# Patient Record
Sex: MALE | Race: WHITE | NOT HISPANIC OR LATINO
[De-identification: names, ages, dates, MRNs, and addresses within clinical notes are randomized per-mention and may not be internally consistent; named-entity substitution may affect disease eponyms.]

---

## 2015-11-27 RX ORDER — TACROLIMUS 5 MG/1
1.5 CAPSULE ORAL
Qty: 0 | Refills: 0 | COMMUNITY
Start: 2015-11-27

## 2015-11-27 RX ORDER — INSULIN GLARGINE 100 [IU]/ML
80 INJECTION, SOLUTION SUBCUTANEOUS
Qty: 0 | Refills: 0 | COMMUNITY
Start: 2015-11-27

## 2015-11-27 RX ORDER — INSULIN LISPRO 100/ML
15 VIAL (ML) SUBCUTANEOUS
Qty: 0 | Refills: 0 | COMMUNITY
Start: 2015-11-27

## 2017-01-23 ENCOUNTER — APPOINTMENT (OUTPATIENT)
Dept: DERMATOLOGY | Facility: CLINIC | Age: 52
End: 2017-01-23

## 2017-01-23 VITALS
BODY MASS INDEX: 39.1 KG/M2 | WEIGHT: 295 LBS | SYSTOLIC BLOOD PRESSURE: 130 MMHG | DIASTOLIC BLOOD PRESSURE: 80 MMHG | HEIGHT: 73 IN

## 2017-01-23 DIAGNOSIS — Z79.899 OTHER LONG TERM (CURRENT) DRUG THERAPY: ICD-10-CM

## 2017-01-23 DIAGNOSIS — Z87.448 PERSONAL HISTORY OF OTHER DISEASES OF URINARY SYSTEM: ICD-10-CM

## 2017-01-23 DIAGNOSIS — L21.9 SEBORRHEIC DERMATITIS, UNSPECIFIED: ICD-10-CM

## 2017-01-23 DIAGNOSIS — H91.90 UNSPECIFIED HEARING LOSS, UNSPECIFIED EAR: ICD-10-CM

## 2017-01-23 DIAGNOSIS — Z12.83 ENCOUNTER FOR SCREENING FOR MALIGNANT NEOPLASM OF SKIN: ICD-10-CM

## 2017-01-23 DIAGNOSIS — Z86.39 PERSONAL HISTORY OF OTHER ENDOCRINE, NUTRITIONAL AND METABOLIC DISEASE: ICD-10-CM

## 2017-02-27 ENCOUNTER — LABORATORY RESULT (OUTPATIENT)
Age: 52
End: 2017-02-27

## 2017-02-27 ENCOUNTER — OUTPATIENT (OUTPATIENT)
Dept: OUTPATIENT SERVICES | Facility: HOSPITAL | Age: 52
LOS: 1 days | End: 2017-02-27
Payer: MEDICARE

## 2017-02-27 ENCOUNTER — APPOINTMENT (OUTPATIENT)
Dept: NEPHROLOGY | Facility: CLINIC | Age: 52
End: 2017-02-27

## 2017-02-27 VITALS
HEART RATE: 69 BPM | OXYGEN SATURATION: 94 % | SYSTOLIC BLOOD PRESSURE: 152 MMHG | DIASTOLIC BLOOD PRESSURE: 92 MMHG | WEIGHT: 309 LBS | TEMPERATURE: 98 F | BODY MASS INDEX: 40.77 KG/M2

## 2017-02-27 DIAGNOSIS — N18.6 END STAGE RENAL DISEASE: Chronic | ICD-10-CM

## 2017-02-27 DIAGNOSIS — Z00.00 ENCOUNTER FOR GENERAL ADULT MEDICAL EXAMINATION W/OUT ABNORMAL FINDINGS: ICD-10-CM

## 2017-02-27 DIAGNOSIS — Z94.0 KIDNEY TRANSPLANT STATUS: ICD-10-CM

## 2017-02-27 DIAGNOSIS — N18.9 CHRONIC KIDNEY DISEASE, UNSPECIFIED: ICD-10-CM

## 2017-02-27 DIAGNOSIS — R60.0 LOCALIZED EDEMA: ICD-10-CM

## 2017-02-27 LAB
ALBUMIN SERPL ELPH-MCNC: 4.1 G/DL — SIGNIFICANT CHANGE UP (ref 3.3–5)
ALP SERPL-CCNC: 115 U/L — SIGNIFICANT CHANGE UP (ref 40–120)
ALT FLD-CCNC: 15 U/L — SIGNIFICANT CHANGE UP (ref 10–45)
ANION GAP SERPL CALC-SCNC: 13 MMOL/L — SIGNIFICANT CHANGE UP (ref 5–17)
APPEARANCE UR: CLEAR — SIGNIFICANT CHANGE UP
AST SERPL-CCNC: 13 U/L — SIGNIFICANT CHANGE UP (ref 10–40)
BACTERIA # UR AUTO: NEGATIVE — SIGNIFICANT CHANGE UP
BASOPHILS # BLD AUTO: 0.05 K/UL — SIGNIFICANT CHANGE UP (ref 0–0.2)
BASOPHILS NFR BLD AUTO: 0.7 % — SIGNIFICANT CHANGE UP (ref 0–2)
BILIRUB SERPL-MCNC: 0.5 MG/DL — SIGNIFICANT CHANGE UP (ref 0.2–1.2)
BILIRUB UR-MCNC: NEGATIVE — SIGNIFICANT CHANGE UP
BUN SERPL-MCNC: 15 MG/DL — SIGNIFICANT CHANGE UP (ref 7–23)
CALCIUM SERPL-MCNC: 9.6 MG/DL — SIGNIFICANT CHANGE UP (ref 8.4–10.5)
CHLORIDE SERPL-SCNC: 96 MMOL/L — SIGNIFICANT CHANGE UP (ref 96–108)
CO2 SERPL-SCNC: 33 MMOL/L — HIGH (ref 22–31)
COLOR SPEC: YELLOW — SIGNIFICANT CHANGE UP
CREAT ?TM UR-MCNC: 108 MG/DL — SIGNIFICANT CHANGE UP
CREAT SERPL-MCNC: 1.09 MG/DL — SIGNIFICANT CHANGE UP (ref 0.5–1.3)
DIFF PNL FLD: ABNORMAL
EOSINOPHIL # BLD AUTO: 0.37 K/UL — SIGNIFICANT CHANGE UP (ref 0–0.5)
EOSINOPHIL NFR BLD AUTO: 5 % — SIGNIFICANT CHANGE UP (ref 0–6)
EPI CELLS # UR: 0 /HPF — SIGNIFICANT CHANGE UP (ref 0–5)
GLUCOSE SERPL-MCNC: 191 MG/DL — HIGH (ref 70–99)
GLUCOSE UR QL: 500 MG/DL
HCT VFR BLD CALC: 54.3 % — HIGH (ref 39–50)
HGB BLD-MCNC: 17.7 G/DL — HIGH (ref 13–17)
IMM GRANULOCYTES NFR BLD AUTO: 0.1 % — SIGNIFICANT CHANGE UP (ref 0–1.5)
KETONES UR-MCNC: NEGATIVE — SIGNIFICANT CHANGE UP
LDH SERPL L TO P-CCNC: 177 U/L — SIGNIFICANT CHANGE UP (ref 50–242)
LEUKOCYTE ESTERASE UR-ACNC: NEGATIVE — SIGNIFICANT CHANGE UP
LYMPHOCYTES # BLD AUTO: 1.28 K/UL — SIGNIFICANT CHANGE UP (ref 1–3.3)
LYMPHOCYTES # BLD AUTO: 17.2 % — SIGNIFICANT CHANGE UP (ref 13–44)
MAGNESIUM SERPL-MCNC: 1.5 MG/DL — LOW (ref 1.6–2.6)
MCHC RBC-ENTMCNC: 29.8 PG — SIGNIFICANT CHANGE UP (ref 27–34)
MCHC RBC-ENTMCNC: 32.6 GM/DL — SIGNIFICANT CHANGE UP (ref 32–36)
MCV RBC AUTO: 91.4 FL — SIGNIFICANT CHANGE UP (ref 80–100)
MONOCYTES # BLD AUTO: 0.66 K/UL — SIGNIFICANT CHANGE UP (ref 0–0.9)
MONOCYTES NFR BLD AUTO: 8.9 % — SIGNIFICANT CHANGE UP (ref 2–14)
NEUTROPHILS # BLD AUTO: 5.07 K/UL — SIGNIFICANT CHANGE UP (ref 1.8–7.4)
NEUTROPHILS NFR BLD AUTO: 68.1 % — SIGNIFICANT CHANGE UP (ref 43–77)
NITRITE UR-MCNC: NEGATIVE — SIGNIFICANT CHANGE UP
PH UR: 6 — SIGNIFICANT CHANGE UP (ref 5–8)
PHOSPHATE SERPL-MCNC: 3.1 MG/DL — SIGNIFICANT CHANGE UP (ref 2.5–4.5)
PLATELET # BLD AUTO: 166 K/UL — SIGNIFICANT CHANGE UP (ref 150–400)
POTASSIUM SERPL-MCNC: 4.5 MMOL/L — SIGNIFICANT CHANGE UP (ref 3.5–5.3)
POTASSIUM SERPL-SCNC: 4.5 MMOL/L — SIGNIFICANT CHANGE UP (ref 3.5–5.3)
PROT ?TM UR-MCNC: 58 MG/DL — HIGH (ref 0–12)
PROT SERPL-MCNC: 6.6 G/DL — SIGNIFICANT CHANGE UP (ref 6–8.3)
PROT UR-MCNC: 30 MG/DL
PROT/CREAT UR-RTO: 0.5 RATIO — HIGH (ref 0–0.2)
RBC # BLD: 5.94 M/UL — HIGH (ref 4.2–5.8)
RBC # FLD: 13.8 % — SIGNIFICANT CHANGE UP (ref 10.3–14.5)
RBC CASTS # UR COMP ASSIST: 20 /HPF — HIGH (ref 0–4)
SODIUM SERPL-SCNC: 142 MMOL/L — SIGNIFICANT CHANGE UP (ref 135–145)
SP GR SPEC: 1.02 — SIGNIFICANT CHANGE UP (ref 1.01–1.02)
TACROLIMUS SERPL-MCNC: 7.6 NG/ML — SIGNIFICANT CHANGE UP
URATE SERPL-MCNC: 3.6 MG/DL — SIGNIFICANT CHANGE UP (ref 3.4–8.8)
UROBILINOGEN FLD QL: NEGATIVE MG/DL — SIGNIFICANT CHANGE UP
WBC # BLD: 7.44 K/UL — SIGNIFICANT CHANGE UP (ref 3.8–10.5)
WBC # FLD AUTO: 7.44 K/UL — SIGNIFICANT CHANGE UP (ref 3.8–10.5)
WBC UR QL: 1 /HPF — SIGNIFICANT CHANGE UP (ref 0–5)

## 2017-02-27 PROCEDURE — 87799 DETECT AGENT NOS DNA QUANT: CPT

## 2017-02-27 PROCEDURE — 81001 URINALYSIS AUTO W/SCOPE: CPT

## 2017-02-27 PROCEDURE — 84550 ASSAY OF BLOOD/URIC ACID: CPT

## 2017-02-27 PROCEDURE — 83735 ASSAY OF MAGNESIUM: CPT

## 2017-02-27 PROCEDURE — 84100 ASSAY OF PHOSPHORUS: CPT

## 2017-02-27 PROCEDURE — 84156 ASSAY OF PROTEIN URINE: CPT

## 2017-02-27 PROCEDURE — 80053 COMPREHEN METABOLIC PANEL: CPT

## 2017-02-27 PROCEDURE — 83615 LACTATE (LD) (LDH) ENZYME: CPT

## 2017-02-27 PROCEDURE — 80197 ASSAY OF TACROLIMUS: CPT

## 2017-02-27 PROCEDURE — 85027 COMPLETE CBC AUTOMATED: CPT

## 2017-02-27 RX ORDER — KETOCONAZOLE 20 MG/G
2 CREAM TOPICAL
Qty: 1 | Refills: 0 | Status: DISCONTINUED | COMMUNITY
Start: 2017-01-23 | End: 2017-02-27

## 2017-02-27 RX ORDER — HYDROCORTISONE 25 MG/G
2.5 CREAM TOPICAL
Qty: 1 | Refills: 2 | Status: DISCONTINUED | COMMUNITY
Start: 2017-01-23 | End: 2017-02-27

## 2017-03-01 LAB
BKV DNA SPEC QL NAA+PROBE: SIGNIFICANT CHANGE UP
CMV DNA CSF QL NAA+PROBE: SIGNIFICANT CHANGE UP
CMV DNA SPEC NAA+PROBE-LOG#: SIGNIFICANT CHANGE UP LOGIU/ML

## 2017-03-27 ENCOUNTER — APPOINTMENT (OUTPATIENT)
Dept: NEPHROLOGY | Facility: CLINIC | Age: 52
End: 2017-03-27

## 2017-03-27 VITALS
DIASTOLIC BLOOD PRESSURE: 92 MMHG | HEART RATE: 72 BPM | BODY MASS INDEX: 41.43 KG/M2 | SYSTOLIC BLOOD PRESSURE: 162 MMHG | TEMPERATURE: 98.1 F | WEIGHT: 314 LBS | RESPIRATION RATE: 16 BRPM

## 2017-03-27 RX ORDER — PEN NEEDLE, DIABETIC 29 G X1/2"
31G X 8 MM NEEDLE, DISPOSABLE MISCELLANEOUS
Qty: 100 | Refills: 0 | Status: ACTIVE | COMMUNITY
Start: 2016-11-10

## 2017-04-23 ENCOUNTER — INPATIENT (INPATIENT)
Facility: HOSPITAL | Age: 52
LOS: 15 days | Discharge: HOME | DRG: 189 | End: 2017-05-09
Attending: INTERNAL MEDICINE | Admitting: INTERNAL MEDICINE
Payer: MEDICARE

## 2017-04-23 VITALS
HEART RATE: 112 BPM | DIASTOLIC BLOOD PRESSURE: 79 MMHG | RESPIRATION RATE: 20 BRPM | OXYGEN SATURATION: 65 % | TEMPERATURE: 98 F | SYSTOLIC BLOOD PRESSURE: 184 MMHG

## 2017-04-23 DIAGNOSIS — N18.6 END STAGE RENAL DISEASE: Chronic | ICD-10-CM

## 2017-04-23 LAB
APPEARANCE UR: CLEAR — SIGNIFICANT CHANGE UP
APTT BLD: 28.6 SEC — SIGNIFICANT CHANGE UP (ref 27.5–37.4)
BASOPHILS # BLD AUTO: 0 K/UL — SIGNIFICANT CHANGE UP (ref 0–0.2)
BASOPHILS NFR BLD AUTO: 0.3 % — SIGNIFICANT CHANGE UP (ref 0–2)
BILIRUB UR-MCNC: NEGATIVE — SIGNIFICANT CHANGE UP
CK MB BLD-MCNC: 1.6 % — SIGNIFICANT CHANGE UP (ref 0–3.5)
CK MB CFR SERPL CALC: 2.4 NG/ML — SIGNIFICANT CHANGE UP (ref 0–6.7)
CK SERPL-CCNC: 148 U/L — SIGNIFICANT CHANGE UP (ref 30–200)
COLOR SPEC: YELLOW — SIGNIFICANT CHANGE UP
DIFF PNL FLD: ABNORMAL
EOSINOPHIL # BLD AUTO: 0.1 K/UL — SIGNIFICANT CHANGE UP (ref 0–0.5)
EOSINOPHIL NFR BLD AUTO: 0.9 % — SIGNIFICANT CHANGE UP (ref 0–6)
EPI CELLS # UR: SIGNIFICANT CHANGE UP /HPF
GAS PNL BLDV: SIGNIFICANT CHANGE UP
GLUCOSE UR QL: >1000
HCT VFR BLD CALC: 54.6 % — HIGH (ref 39–50)
HGB BLD-MCNC: 17.7 G/DL — HIGH (ref 13–17)
HPIV3 RNA SPEC QL NAA+PROBE: DETECTED
INR BLD: 1.05 RATIO — SIGNIFICANT CHANGE UP (ref 0.88–1.16)
KETONES UR-MCNC: NEGATIVE — SIGNIFICANT CHANGE UP
LEUKOCYTE ESTERASE UR-ACNC: NEGATIVE — SIGNIFICANT CHANGE UP
LYMPHOCYTES # BLD AUTO: 0.7 K/UL — LOW (ref 1–3.3)
LYMPHOCYTES # BLD AUTO: 10.1 % — LOW (ref 13–44)
MCHC RBC-ENTMCNC: 29.8 PG — SIGNIFICANT CHANGE UP (ref 27–34)
MCHC RBC-ENTMCNC: 32.4 GM/DL — SIGNIFICANT CHANGE UP (ref 32–36)
MCV RBC AUTO: 92 FL — SIGNIFICANT CHANGE UP (ref 80–100)
MONOCYTES # BLD AUTO: 1 K/UL — HIGH (ref 0–0.9)
MONOCYTES NFR BLD AUTO: 13.8 % — SIGNIFICANT CHANGE UP (ref 2–14)
NEUTROPHILS # BLD AUTO: 5.3 K/UL — SIGNIFICANT CHANGE UP (ref 1.8–7.4)
NEUTROPHILS NFR BLD AUTO: 74.9 % — SIGNIFICANT CHANGE UP (ref 43–77)
NITRITE UR-MCNC: NEGATIVE — SIGNIFICANT CHANGE UP
NT-PROBNP SERPL-SCNC: 840 PG/ML — HIGH (ref 0–300)
PH UR: 6 — SIGNIFICANT CHANGE UP (ref 5–8)
PLATELET # BLD AUTO: 164 K/UL — SIGNIFICANT CHANGE UP (ref 150–400)
PROT UR-MCNC: 100 MG/DL
PROTHROM AB SERPL-ACNC: 11.4 SEC — SIGNIFICANT CHANGE UP (ref 9.8–12.7)
RAPID RVP RESULT: DETECTED
RBC # BLD: 5.94 M/UL — HIGH (ref 4.2–5.8)
RBC # FLD: 13.8 % — SIGNIFICANT CHANGE UP (ref 10.3–14.5)
RBC CASTS # UR COMP ASSIST: ABNORMAL /HPF (ref 0–2)
SP GR SPEC: >1.03 — HIGH (ref 1.01–1.02)
UROBILINOGEN FLD QL: NEGATIVE — SIGNIFICANT CHANGE UP
WBC # BLD: 7 K/UL — SIGNIFICANT CHANGE UP (ref 3.8–10.5)
WBC # FLD AUTO: 7 K/UL — SIGNIFICANT CHANGE UP (ref 3.8–10.5)
WBC UR QL: SIGNIFICANT CHANGE UP /HPF (ref 0–5)

## 2017-04-23 PROCEDURE — 99291 CRITICAL CARE FIRST HOUR: CPT | Mod: 25,GC

## 2017-04-23 PROCEDURE — 93010 ELECTROCARDIOGRAM REPORT: CPT | Mod: 59

## 2017-04-23 PROCEDURE — 31500 INSERT EMERGENCY AIRWAY: CPT | Mod: GC

## 2017-04-23 PROCEDURE — 71250 CT THORAX DX C-: CPT | Mod: 26

## 2017-04-23 PROCEDURE — 71010: CPT | Mod: 26

## 2017-04-23 RX ORDER — SODIUM CHLORIDE 9 MG/ML
1000 INJECTION INTRAMUSCULAR; INTRAVENOUS; SUBCUTANEOUS ONCE
Qty: 0 | Refills: 0 | Status: COMPLETED | OUTPATIENT
Start: 2017-04-23 | End: 2017-04-23

## 2017-04-23 RX ORDER — ALBUTEROL 90 UG/1
2.5 AEROSOL, METERED ORAL
Qty: 0 | Refills: 0 | Status: COMPLETED | OUTPATIENT
Start: 2017-04-23 | End: 2017-04-23

## 2017-04-23 RX ORDER — ACETAMINOPHEN 500 MG
1000 TABLET ORAL ONCE
Qty: 0 | Refills: 0 | Status: COMPLETED | OUTPATIENT
Start: 2017-04-23 | End: 2017-04-23

## 2017-04-23 RX ORDER — INSULIN LISPRO 100/ML
10 VIAL (ML) SUBCUTANEOUS ONCE
Qty: 0 | Refills: 0 | Status: COMPLETED | OUTPATIENT
Start: 2017-04-23 | End: 2017-04-23

## 2017-04-23 RX ADMIN — ALBUTEROL 2.5 MILLIGRAM(S): 90 AEROSOL, METERED ORAL at 21:06

## 2017-04-23 RX ADMIN — Medication 400 MILLIGRAM(S): at 23:20

## 2017-04-23 RX ADMIN — Medication 10 UNIT(S): at 22:25

## 2017-04-23 RX ADMIN — ALBUTEROL 2.5 MILLIGRAM(S): 90 AEROSOL, METERED ORAL at 21:05

## 2017-04-23 RX ADMIN — ALBUTEROL 2.5 MILLIGRAM(S): 90 AEROSOL, METERED ORAL at 21:04

## 2017-04-23 RX ADMIN — Medication 1000 MILLIGRAM(S): at 23:31

## 2017-04-23 RX ADMIN — SODIUM CHLORIDE 1000 MILLILITER(S): 9 INJECTION INTRAMUSCULAR; INTRAVENOUS; SUBCUTANEOUS at 22:25

## 2017-04-23 NOTE — ED ADULT NURSE NOTE - PMH
CKD (chronic kidney disease)    Diabetes mellitus    Diabetic peripheral neuropathy    Hyperlipidemia    Hypertension    Obesity (BMI 30-39.9)

## 2017-04-23 NOTE — ED PROVIDER NOTE - MEDICAL DECISION MAKING DETAILS
Mike: 51 year old male s/p renal transplant being worked up for pulmonary disorder but no diagnosis yet. Patient with low saturations, b/l wheezes and rhonchi.  not tachypenic. will get labs, duonebs, cxr, possible ct chest. patient is also hyperglycemic. labs show no anion gap. Patient did not take insulin today because did not eat. will hydrate and give insulin.  Admit to hospital.

## 2017-04-23 NOTE — ED PROVIDER NOTE - PROGRESS NOTE DETAILS
Sign out follow-up: Pt admitted for PNA. Wife notified staff pt altered. Pt unresponsive RN notified MD that patient was becoming altered and hypoxic. Pt's wife states that she was noticing his O2 sat was dropping and he was becoming more somnolent. Pt trialed on BiPap and ABG sent as well as stat CXR. Pt only pulling 150cc on Bipap and cxr showing pulmonary vascular congestion. Decision was made to intubate. Wife informed and consenting. ABG resulted in pCO2 131. Renzo Quiñones, Resident. Patient more comfortable after duonebs. Patient saturating 90% on nc 6l. No respiratory distress. cxr unremarkable, given lung exam findings, will get ct chest. Patient spiked fever to 101.5.will give tylenol and cover for pna. ct chest shows multilobar opacities.  Patient informed of results. rvp positive for parainfluenza. Pt intubated for hypercapnic respiratory failure. Admission upgraded to MICU. KP

## 2017-04-23 NOTE — ED PROVIDER NOTE - OBJECTIVE STATEMENT
51yM h/o CKD s/p renal transplant Dec 2013 on tacrolimus, DM on insulin, HTN, HLD, obesity presents with shortness of breath. Patient reports recent SOB and hypoxia noted at transplant center with outpatient pulmonary workup this week including CT chest (not aware of results)  Never smoker. 51yM h/o CKD s/p renal transplant Dec 2013 on tacrolimus, DM on insulin, HTN, HLD, obesity presents with shortness of breath. Patient reports recent SOB and hypoxia noted at transplant center with outpatient pulmonary workup this week including CT chest (not aware of results) at [place]. Worsening symptoms x 1 day. Drove to [place] from [place] today. Notes increased b/l LE edema and orthopnea. Worked in [employer] during 9/11. Never smoker. 51yM h/o CKD s/p renal transplant Dec 2013 on tacrolimus, DM on insulin, HTN, HLD, obesity presents with shortness of breath. Patient reports recent SOB and hypoxia noted at transplant center with outpatient pulmonary workup this week including CT chest (not aware of results) at [place]. Worsening symptoms x 1 day. Drove to [place] from [place] today. Notes increased b/l LE edema and orthopnea. Worked in [facility] during 9/11. Never smoker.  PMD Arti (hospitalist)

## 2017-04-23 NOTE — ED ADULT NURSE NOTE - PSH
Ear disease  Left inner ear surgery  End-stage renal failure with renal transplant  12/2013  H/O foot surgery  2005 - right foot - bone fracture - s/p ORIF and subsequent removal of hardware  Toe infection  2007 L 4th Toe surgery-amputation secondary to osteomyelitis  Vasectomy status  s/p b/l  vasectomy

## 2017-04-23 NOTE — ED PROVIDER NOTE - CRITICAL CARE PROVIDED
documentation/consultation with other physicians/interpretation of diagnostic studies/additional history taking/consult w/ pt's family directly relating to pts condition

## 2017-04-24 DIAGNOSIS — I82.409 ACUTE EMBOLISM AND THROMBOSIS OF UNSPECIFIED DEEP VEINS OF UNSPECIFIED LOWER EXTREMITY: ICD-10-CM

## 2017-04-24 DIAGNOSIS — E11.9 TYPE 2 DIABETES MELLITUS WITHOUT COMPLICATIONS: ICD-10-CM

## 2017-04-24 DIAGNOSIS — I63.9 CEREBRAL INFARCTION, UNSPECIFIED: ICD-10-CM

## 2017-04-24 DIAGNOSIS — N17.9 ACUTE KIDNEY FAILURE, UNSPECIFIED: ICD-10-CM

## 2017-04-24 DIAGNOSIS — J18.9 PNEUMONIA, UNSPECIFIED ORGANISM: ICD-10-CM

## 2017-04-24 DIAGNOSIS — Z29.9 ENCOUNTER FOR PROPHYLACTIC MEASURES, UNSPECIFIED: ICD-10-CM

## 2017-04-24 DIAGNOSIS — I26.99 OTHER PULMONARY EMBOLISM WITHOUT ACUTE COR PULMONALE: ICD-10-CM

## 2017-04-24 DIAGNOSIS — I10 ESSENTIAL (PRIMARY) HYPERTENSION: ICD-10-CM

## 2017-04-24 DIAGNOSIS — R09.02 HYPOXEMIA: ICD-10-CM

## 2017-04-24 DIAGNOSIS — Z94.89 OTHER TRANSPLANTED ORGAN AND TISSUE STATUS: ICD-10-CM

## 2017-04-24 LAB
ALBUMIN SERPL ELPH-MCNC: 3.3 G/DL — SIGNIFICANT CHANGE UP (ref 3.3–5)
ALP SERPL-CCNC: 78 U/L — SIGNIFICANT CHANGE UP (ref 40–120)
ALT FLD-CCNC: 15 U/L RC — SIGNIFICANT CHANGE UP (ref 10–45)
ANION GAP SERPL CALC-SCNC: 11 MMOL/L — SIGNIFICANT CHANGE UP (ref 5–17)
ANION GAP SERPL CALC-SCNC: 12 MMOL/L — SIGNIFICANT CHANGE UP (ref 5–17)
APTT BLD: 26.1 SEC — LOW (ref 27.5–37.4)
AST SERPL-CCNC: 24 U/L — SIGNIFICANT CHANGE UP (ref 10–40)
BASE EXCESS BLDA CALC-SCNC: 3.5 MMOL/L — HIGH (ref -2–2)
BILIRUB SERPL-MCNC: 0.6 MG/DL — SIGNIFICANT CHANGE UP (ref 0.2–1.2)
BUN SERPL-MCNC: 20 MG/DL — SIGNIFICANT CHANGE UP (ref 7–23)
BUN SERPL-MCNC: 21 MG/DL — SIGNIFICANT CHANGE UP (ref 7–23)
CALCIUM SERPL-MCNC: 7.8 MG/DL — LOW (ref 8.4–10.5)
CALCIUM SERPL-MCNC: 8.5 MG/DL — SIGNIFICANT CHANGE UP (ref 8.4–10.5)
CHLORIDE SERPL-SCNC: 96 MMOL/L — SIGNIFICANT CHANGE UP (ref 96–108)
CHLORIDE SERPL-SCNC: 99 MMOL/L — SIGNIFICANT CHANGE UP (ref 96–108)
CO2 BLDA-SCNC: 37 MMOL/L — HIGH (ref 22–30)
CO2 SERPL-SCNC: 31 MMOL/L — SIGNIFICANT CHANGE UP (ref 22–31)
CO2 SERPL-SCNC: 31 MMOL/L — SIGNIFICANT CHANGE UP (ref 22–31)
CREAT SERPL-MCNC: 1.39 MG/DL — HIGH (ref 0.5–1.3)
CREAT SERPL-MCNC: 1.77 MG/DL — HIGH (ref 0.5–1.3)
GAS PNL BLDA: SIGNIFICANT CHANGE UP
GLUCOSE SERPL-MCNC: 259 MG/DL — HIGH (ref 70–99)
GLUCOSE SERPL-MCNC: 404 MG/DL — HIGH (ref 70–99)
HBA1C BLD-MCNC: 11.4 % — HIGH (ref 4–5.6)
HCO3 BLDA-SCNC: 34 MMOL/L — HIGH (ref 21–29)
HCT VFR BLD CALC: 49.2 % — SIGNIFICANT CHANGE UP (ref 39–50)
HGB BLD-MCNC: 15.9 G/DL — SIGNIFICANT CHANGE UP (ref 13–17)
HOROWITZ INDEX BLDA+IHG-RTO: SIGNIFICANT CHANGE UP
INR BLD: 1.22 RATIO — HIGH (ref 0.88–1.16)
MAGNESIUM SERPL-MCNC: 1.6 MG/DL — SIGNIFICANT CHANGE UP (ref 1.6–2.6)
MCHC RBC-ENTMCNC: 29.7 PG — SIGNIFICANT CHANGE UP (ref 27–34)
MCHC RBC-ENTMCNC: 32.4 GM/DL — SIGNIFICANT CHANGE UP (ref 32–36)
MCV RBC AUTO: 91.6 FL — SIGNIFICANT CHANGE UP (ref 80–100)
PCO2 BLDA: 82 MMHG — CRITICAL HIGH (ref 32–46)
PH BLDA: 7.25 — LOW (ref 7.35–7.45)
PHOSPHATE SERPL-MCNC: 2.5 MG/DL — SIGNIFICANT CHANGE UP (ref 2.5–4.5)
PLATELET # BLD AUTO: 166 K/UL — SIGNIFICANT CHANGE UP (ref 150–400)
PO2 BLDA: 84 MMHG — SIGNIFICANT CHANGE UP (ref 74–108)
POTASSIUM SERPL-MCNC: 4.5 MMOL/L — SIGNIFICANT CHANGE UP (ref 3.5–5.3)
POTASSIUM SERPL-MCNC: 4.6 MMOL/L — SIGNIFICANT CHANGE UP (ref 3.5–5.3)
POTASSIUM SERPL-SCNC: 4.5 MMOL/L — SIGNIFICANT CHANGE UP (ref 3.5–5.3)
POTASSIUM SERPL-SCNC: 4.6 MMOL/L — SIGNIFICANT CHANGE UP (ref 3.5–5.3)
PROT SERPL-MCNC: 5.5 G/DL — LOW (ref 6–8.3)
PROTHROM AB SERPL-ACNC: 13.3 SEC — HIGH (ref 9.8–12.7)
RBC # BLD: 5.37 M/UL — SIGNIFICANT CHANGE UP (ref 4.2–5.8)
RBC # FLD: 13.9 % — SIGNIFICANT CHANGE UP (ref 10.3–14.5)
SAO2 % BLDA: 93 % — SIGNIFICANT CHANGE UP (ref 92–96)
SODIUM SERPL-SCNC: 139 MMOL/L — SIGNIFICANT CHANGE UP (ref 135–145)
SODIUM SERPL-SCNC: 141 MMOL/L — SIGNIFICANT CHANGE UP (ref 135–145)
TACROLIMUS SERPL-MCNC: 4.9 NG/ML — SIGNIFICANT CHANGE UP
VANCOMYCIN TROUGH SERPL-MCNC: 4.3 UG/ML — LOW (ref 10–20)
WBC # BLD: 10.5 K/UL — SIGNIFICANT CHANGE UP (ref 3.8–10.5)
WBC # FLD AUTO: 10.5 K/UL — SIGNIFICANT CHANGE UP (ref 3.8–10.5)

## 2017-04-24 PROCEDURE — 93306 TTE W/DOPPLER COMPLETE: CPT | Mod: 26

## 2017-04-24 PROCEDURE — 99291 CRITICAL CARE FIRST HOUR: CPT

## 2017-04-24 PROCEDURE — 93970 EXTREMITY STUDY: CPT | Mod: 26

## 2017-04-24 PROCEDURE — 71010: CPT | Mod: 26,76

## 2017-04-24 PROCEDURE — 99223 1ST HOSP IP/OBS HIGH 75: CPT | Mod: AI

## 2017-04-24 PROCEDURE — 99223 1ST HOSP IP/OBS HIGH 75: CPT | Mod: GC

## 2017-04-24 RX ORDER — CEFTRIAXONE 500 MG/1
1 INJECTION, POWDER, FOR SOLUTION INTRAMUSCULAR; INTRAVENOUS ONCE
Qty: 0 | Refills: 0 | Status: COMPLETED | OUTPATIENT
Start: 2017-04-24 | End: 2017-04-24

## 2017-04-24 RX ORDER — ACETAMINOPHEN 500 MG
650 TABLET ORAL EVERY 6 HOURS
Qty: 0 | Refills: 0 | Status: DISCONTINUED | OUTPATIENT
Start: 2017-04-24 | End: 2017-04-24

## 2017-04-24 RX ORDER — TACROLIMUS 5 MG/1
1.5 CAPSULE ORAL
Qty: 0 | Refills: 0 | Status: DISCONTINUED | OUTPATIENT
Start: 2017-04-24 | End: 2017-04-24

## 2017-04-24 RX ORDER — INSULIN LISPRO 100/ML
5 VIAL (ML) SUBCUTANEOUS ONCE
Qty: 0 | Refills: 0 | Status: DISCONTINUED | OUTPATIENT
Start: 2017-04-24 | End: 2017-04-24

## 2017-04-24 RX ORDER — VANCOMYCIN HCL 1 G
1000 VIAL (EA) INTRAVENOUS EVERY 12 HOURS
Qty: 0 | Refills: 0 | Status: DISCONTINUED | OUTPATIENT
Start: 2017-04-24 | End: 2017-04-26

## 2017-04-24 RX ORDER — INSULIN LISPRO 100/ML
VIAL (ML) SUBCUTANEOUS EVERY 4 HOURS
Qty: 0 | Refills: 0 | Status: DISCONTINUED | OUTPATIENT
Start: 2017-04-24 | End: 2017-04-26

## 2017-04-24 RX ORDER — ACETAMINOPHEN 500 MG
650 TABLET ORAL ONCE
Qty: 0 | Refills: 0 | Status: COMPLETED | OUTPATIENT
Start: 2017-04-24 | End: 2017-04-24

## 2017-04-24 RX ORDER — SODIUM CHLORIDE 9 MG/ML
1000 INJECTION, SOLUTION INTRAVENOUS
Qty: 0 | Refills: 0 | Status: DISCONTINUED | OUTPATIENT
Start: 2017-04-24 | End: 2017-05-09

## 2017-04-24 RX ORDER — DEXTROSE 50 % IN WATER 50 %
12.5 SYRINGE (ML) INTRAVENOUS ONCE
Qty: 0 | Refills: 0 | Status: DISCONTINUED | OUTPATIENT
Start: 2017-04-24 | End: 2017-05-09

## 2017-04-24 RX ORDER — ACETAMINOPHEN 500 MG
1000 TABLET ORAL ONCE
Qty: 0 | Refills: 0 | Status: COMPLETED | OUTPATIENT
Start: 2017-04-24 | End: 2017-04-24

## 2017-04-24 RX ORDER — SODIUM CHLORIDE 9 MG/ML
1000 INJECTION INTRAMUSCULAR; INTRAVENOUS; SUBCUTANEOUS ONCE
Qty: 0 | Refills: 0 | Status: COMPLETED | OUTPATIENT
Start: 2017-04-24 | End: 2017-04-24

## 2017-04-24 RX ORDER — AZITHROMYCIN 500 MG/1
500 TABLET, FILM COATED ORAL EVERY 24 HOURS
Qty: 0 | Refills: 0 | Status: DISCONTINUED | OUTPATIENT
Start: 2017-04-24 | End: 2017-04-25

## 2017-04-24 RX ORDER — ATORVASTATIN CALCIUM 80 MG/1
20 TABLET, FILM COATED ORAL AT BEDTIME
Qty: 0 | Refills: 0 | Status: DISCONTINUED | OUTPATIENT
Start: 2017-04-24 | End: 2017-04-27

## 2017-04-24 RX ORDER — DEXTROSE 50 % IN WATER 50 %
1 SYRINGE (ML) INTRAVENOUS ONCE
Qty: 0 | Refills: 0 | Status: DISCONTINUED | OUTPATIENT
Start: 2017-04-24 | End: 2017-05-09

## 2017-04-24 RX ORDER — PIPERACILLIN AND TAZOBACTAM 4; .5 G/20ML; G/20ML
3.38 INJECTION, POWDER, LYOPHILIZED, FOR SOLUTION INTRAVENOUS ONCE
Qty: 0 | Refills: 0 | Status: COMPLETED | OUTPATIENT
Start: 2017-04-24 | End: 2017-04-24

## 2017-04-24 RX ORDER — PROPOFOL 10 MG/ML
2.32 INJECTION, EMULSION INTRAVENOUS
Qty: 500 | Refills: 0 | Status: DISCONTINUED | OUTPATIENT
Start: 2017-04-24 | End: 2017-04-26

## 2017-04-24 RX ORDER — PHENYLEPHRINE HYDROCHLORIDE 10 MG/ML
0.04 INJECTION INTRAVENOUS
Qty: 80 | Refills: 0 | Status: DISCONTINUED | OUTPATIENT
Start: 2017-04-24 | End: 2017-04-24

## 2017-04-24 RX ORDER — ASPIRIN/CALCIUM CARB/MAGNESIUM 324 MG
325 TABLET ORAL DAILY
Qty: 0 | Refills: 0 | Status: DISCONTINUED | OUTPATIENT
Start: 2017-04-24 | End: 2017-05-02

## 2017-04-24 RX ORDER — ATORVASTATIN CALCIUM 80 MG/1
20 TABLET, FILM COATED ORAL AT BEDTIME
Qty: 0 | Refills: 0 | Status: DISCONTINUED | OUTPATIENT
Start: 2017-04-24 | End: 2017-04-24

## 2017-04-24 RX ORDER — GLUCAGON INJECTION, SOLUTION 0.5 MG/.1ML
1 INJECTION, SOLUTION SUBCUTANEOUS ONCE
Qty: 0 | Refills: 0 | Status: DISCONTINUED | OUTPATIENT
Start: 2017-04-24 | End: 2017-05-09

## 2017-04-24 RX ORDER — FENTANYL CITRATE 50 UG/ML
50 INJECTION INTRAVENOUS ONCE
Qty: 0 | Refills: 0 | Status: DISCONTINUED | OUTPATIENT
Start: 2017-04-24 | End: 2017-04-24

## 2017-04-24 RX ORDER — METOPROLOL TARTRATE 50 MG
25 TABLET ORAL
Qty: 0 | Refills: 0 | Status: DISCONTINUED | OUTPATIENT
Start: 2017-04-24 | End: 2017-04-24

## 2017-04-24 RX ORDER — PANTOPRAZOLE SODIUM 20 MG/1
40 TABLET, DELAYED RELEASE ORAL ONCE
Qty: 0 | Refills: 0 | Status: DISCONTINUED | OUTPATIENT
Start: 2017-04-24 | End: 2017-04-24

## 2017-04-24 RX ORDER — HEPARIN SODIUM 5000 [USP'U]/ML
5000 INJECTION INTRAVENOUS; SUBCUTANEOUS EVERY 12 HOURS
Qty: 0 | Refills: 0 | Status: DISCONTINUED | OUTPATIENT
Start: 2017-04-24 | End: 2017-04-25

## 2017-04-24 RX ORDER — INSULIN LISPRO 100/ML
10 VIAL (ML) SUBCUTANEOUS
Qty: 0 | Refills: 0 | Status: DISCONTINUED | OUTPATIENT
Start: 2017-04-24 | End: 2017-04-24

## 2017-04-24 RX ORDER — PANTOPRAZOLE SODIUM 20 MG/1
40 TABLET, DELAYED RELEASE ORAL DAILY
Qty: 0 | Refills: 0 | Status: DISCONTINUED | OUTPATIENT
Start: 2017-04-24 | End: 2017-05-09

## 2017-04-24 RX ORDER — FENTANYL CITRATE 50 UG/ML
2 INJECTION INTRAVENOUS
Qty: 5000 | Refills: 0 | Status: DISCONTINUED | OUTPATIENT
Start: 2017-04-24 | End: 2017-04-26

## 2017-04-24 RX ORDER — HUMAN INSULIN 100 [IU]/ML
10 INJECTION, SUSPENSION SUBCUTANEOUS ONCE
Qty: 0 | Refills: 0 | Status: COMPLETED | OUTPATIENT
Start: 2017-04-24 | End: 2017-04-24

## 2017-04-24 RX ORDER — SODIUM CHLORIDE 9 MG/ML
1000 INJECTION INTRAMUSCULAR; INTRAVENOUS; SUBCUTANEOUS
Qty: 0 | Refills: 0 | Status: DISCONTINUED | OUTPATIENT
Start: 2017-04-24 | End: 2017-04-26

## 2017-04-24 RX ORDER — ETOMIDATE 2 MG/ML
20 INJECTION INTRAVENOUS ONCE
Qty: 0 | Refills: 0 | Status: COMPLETED | OUTPATIENT
Start: 2017-04-24 | End: 2017-04-24

## 2017-04-24 RX ORDER — DEXTROSE 50 % IN WATER 50 %
25 SYRINGE (ML) INTRAVENOUS ONCE
Qty: 0 | Refills: 0 | Status: DISCONTINUED | OUTPATIENT
Start: 2017-04-24 | End: 2017-05-09

## 2017-04-24 RX ORDER — AZATHIOPRINE 100 MG/1
100 TABLET ORAL DAILY
Qty: 0 | Refills: 0 | Status: DISCONTINUED | OUTPATIENT
Start: 2017-04-24 | End: 2017-04-24

## 2017-04-24 RX ORDER — FAMOTIDINE 10 MG/ML
20 INJECTION INTRAVENOUS
Qty: 0 | Refills: 0 | Status: DISCONTINUED | OUTPATIENT
Start: 2017-04-24 | End: 2017-04-24

## 2017-04-24 RX ORDER — INSULIN GLARGINE 100 [IU]/ML
40 INJECTION, SOLUTION SUBCUTANEOUS ONCE
Qty: 0 | Refills: 0 | Status: DISCONTINUED | OUTPATIENT
Start: 2017-04-24 | End: 2017-04-24

## 2017-04-24 RX ORDER — INSULIN GLARGINE 100 [IU]/ML
50 INJECTION, SOLUTION SUBCUTANEOUS AT BEDTIME
Qty: 0 | Refills: 0 | Status: DISCONTINUED | OUTPATIENT
Start: 2017-04-24 | End: 2017-04-24

## 2017-04-24 RX ORDER — INSULIN LISPRO 100/ML
VIAL (ML) SUBCUTANEOUS
Qty: 0 | Refills: 0 | Status: DISCONTINUED | OUTPATIENT
Start: 2017-04-24 | End: 2017-04-24

## 2017-04-24 RX ORDER — ASPIRIN/CALCIUM CARB/MAGNESIUM 324 MG
325 TABLET ORAL DAILY
Qty: 0 | Refills: 0 | Status: DISCONTINUED | OUTPATIENT
Start: 2017-04-24 | End: 2017-04-24

## 2017-04-24 RX ORDER — ACETAMINOPHEN 500 MG
650 TABLET ORAL ONCE
Qty: 0 | Refills: 0 | Status: DISCONTINUED | OUTPATIENT
Start: 2017-04-24 | End: 2017-04-24

## 2017-04-24 RX ORDER — AZITHROMYCIN 500 MG/1
500 TABLET, FILM COATED ORAL ONCE
Qty: 0 | Refills: 0 | Status: DISCONTINUED | OUTPATIENT
Start: 2017-04-24 | End: 2017-04-24

## 2017-04-24 RX ORDER — INSULIN LISPRO 100/ML
VIAL (ML) SUBCUTANEOUS AT BEDTIME
Qty: 0 | Refills: 0 | Status: DISCONTINUED | OUTPATIENT
Start: 2017-04-24 | End: 2017-04-24

## 2017-04-24 RX ORDER — HUMAN INSULIN 100 [IU]/ML
40 INJECTION, SUSPENSION SUBCUTANEOUS
Qty: 0 | Refills: 0 | Status: DISCONTINUED | OUTPATIENT
Start: 2017-04-24 | End: 2017-04-25

## 2017-04-24 RX ORDER — PHENYLEPHRINE HYDROCHLORIDE 10 MG/ML
0.04 INJECTION INTRAVENOUS
Qty: 80 | Refills: 0 | Status: DISCONTINUED | OUTPATIENT
Start: 2017-04-24 | End: 2017-04-26

## 2017-04-24 RX ORDER — VANCOMYCIN HCL 1 G
1000 VIAL (EA) INTRAVENOUS ONCE
Qty: 0 | Refills: 0 | Status: COMPLETED | OUTPATIENT
Start: 2017-04-24 | End: 2017-04-24

## 2017-04-24 RX ORDER — PIPERACILLIN AND TAZOBACTAM 4; .5 G/20ML; G/20ML
3.38 INJECTION, POWDER, LYOPHILIZED, FOR SOLUTION INTRAVENOUS EVERY 8 HOURS
Qty: 0 | Refills: 0 | Status: DISCONTINUED | OUTPATIENT
Start: 2017-04-24 | End: 2017-04-29

## 2017-04-24 RX ORDER — IPRATROPIUM/ALBUTEROL SULFATE 18-103MCG
3 AEROSOL WITH ADAPTER (GRAM) INHALATION EVERY 6 HOURS
Qty: 0 | Refills: 0 | Status: DISCONTINUED | OUTPATIENT
Start: 2017-04-24 | End: 2017-05-09

## 2017-04-24 RX ORDER — VANCOMYCIN HCL 1 G
1000 VIAL (EA) INTRAVENOUS ONCE
Qty: 0 | Refills: 0 | Status: DISCONTINUED | OUTPATIENT
Start: 2017-04-24 | End: 2017-04-24

## 2017-04-24 RX ORDER — HUMAN INSULIN 100 [IU]/ML
40 INJECTION, SUSPENSION SUBCUTANEOUS ONCE
Qty: 0 | Refills: 0 | Status: COMPLETED | OUTPATIENT
Start: 2017-04-24 | End: 2017-04-24

## 2017-04-24 RX ORDER — FENTANYL CITRATE 50 UG/ML
2 INJECTION INTRAVENOUS
Qty: 2500 | Refills: 0 | Status: DISCONTINUED | OUTPATIENT
Start: 2017-04-24 | End: 2017-04-24

## 2017-04-24 RX ADMIN — HUMAN INSULIN 40 UNIT(S): 100 INJECTION, SUSPENSION SUBCUTANEOUS at 10:00

## 2017-04-24 RX ADMIN — HEPARIN SODIUM 5000 UNIT(S): 5000 INJECTION INTRAVENOUS; SUBCUTANEOUS at 21:33

## 2017-04-24 RX ADMIN — HUMAN INSULIN 10 UNIT(S): 100 INJECTION, SUSPENSION SUBCUTANEOUS at 22:01

## 2017-04-24 RX ADMIN — SODIUM CHLORIDE 75 MILLILITER(S): 9 INJECTION INTRAMUSCULAR; INTRAVENOUS; SUBCUTANEOUS at 09:50

## 2017-04-24 RX ADMIN — PIPERACILLIN AND TAZOBACTAM 200 GRAM(S): 4; .5 INJECTION, POWDER, LYOPHILIZED, FOR SOLUTION INTRAVENOUS at 02:11

## 2017-04-24 RX ADMIN — PIPERACILLIN AND TAZOBACTAM 25 GRAM(S): 4; .5 INJECTION, POWDER, LYOPHILIZED, FOR SOLUTION INTRAVENOUS at 13:50

## 2017-04-24 RX ADMIN — FENTANYL CITRATE 50 MICROGRAM(S): 50 INJECTION INTRAVENOUS at 04:23

## 2017-04-24 RX ADMIN — Medication 250 MILLIGRAM(S): at 02:38

## 2017-04-24 RX ADMIN — SODIUM CHLORIDE 1000 MILLILITER(S): 9 INJECTION INTRAMUSCULAR; INTRAVENOUS; SUBCUTANEOUS at 00:24

## 2017-04-24 RX ADMIN — Medication 1 TABLET(S): at 15:45

## 2017-04-24 RX ADMIN — FENTANYL CITRATE 14.36 MICROGRAM(S)/KG/HR: 50 INJECTION INTRAVENOUS at 05:44

## 2017-04-24 RX ADMIN — Medication 325 MILLIGRAM(S): at 12:54

## 2017-04-24 RX ADMIN — PANTOPRAZOLE SODIUM 40 MILLIGRAM(S): 20 TABLET, DELAYED RELEASE ORAL at 12:04

## 2017-04-24 RX ADMIN — ETOMIDATE 20 MILLIGRAM(S): 2 INJECTION INTRAVENOUS at 04:23

## 2017-04-24 RX ADMIN — FENTANYL CITRATE 50 MICROGRAM(S): 50 INJECTION INTRAVENOUS at 06:53

## 2017-04-24 RX ADMIN — HEPARIN SODIUM 5000 UNIT(S): 5000 INJECTION INTRAVENOUS; SUBCUTANEOUS at 10:00

## 2017-04-24 RX ADMIN — SODIUM CHLORIDE 2000 MILLILITER(S): 9 INJECTION INTRAMUSCULAR; INTRAVENOUS; SUBCUTANEOUS at 10:06

## 2017-04-24 RX ADMIN — Medication 650 MILLIGRAM(S): at 19:30

## 2017-04-24 RX ADMIN — PROPOFOL 2 MICROGRAM(S)/KG/MIN: 10 INJECTION, EMULSION INTRAVENOUS at 10:05

## 2017-04-24 RX ADMIN — Medication 3 MILLILITER(S): at 13:05

## 2017-04-24 RX ADMIN — Medication 3 MILLILITER(S): at 23:32

## 2017-04-24 RX ADMIN — Medication 400 MILLIGRAM(S): at 08:40

## 2017-04-24 RX ADMIN — Medication 1: at 17:21

## 2017-04-24 RX ADMIN — PIPERACILLIN AND TAZOBACTAM 25 GRAM(S): 4; .5 INJECTION, POWDER, LYOPHILIZED, FOR SOLUTION INTRAVENOUS at 21:32

## 2017-04-24 RX ADMIN — AZITHROMYCIN 250 MILLIGRAM(S): 500 TABLET, FILM COATED ORAL at 12:00

## 2017-04-24 RX ADMIN — PHENYLEPHRINE HYDROCHLORIDE 2 MICROGRAM(S)/KG/MIN: 10 INJECTION INTRAVENOUS at 12:52

## 2017-04-24 RX ADMIN — Medication 2: at 13:56

## 2017-04-24 RX ADMIN — CEFTRIAXONE 100 GRAM(S): 500 INJECTION, POWDER, FOR SOLUTION INTRAMUSCULAR; INTRAVENOUS at 00:26

## 2017-04-24 RX ADMIN — Medication 3: at 10:50

## 2017-04-24 RX ADMIN — Medication 250 MILLIGRAM(S): at 17:15

## 2017-04-24 RX ADMIN — ATORVASTATIN CALCIUM 20 MILLIGRAM(S): 80 TABLET, FILM COATED ORAL at 21:33

## 2017-04-24 RX ADMIN — Medication 3 MILLILITER(S): at 17:43

## 2017-04-24 NOTE — H&P ADULT. - PROBLEM SELECTOR PLAN 1
likely 2/2 to parainfluenza, however patient is immunosuppressed and cannot r/o superimposed bacterial pna  or OI , will need to cover with broad spectrum ABX  - cont supportive care   - Vanc and zosyn   - fungitel   - sputum culture  - duonebs

## 2017-04-24 NOTE — H&P ADULT. - PROBLEM SELECTOR PLAN 7
patient reports he is taking 25mg lopressor but med list has 50mg , and did not report losartan which is on his outpatient med list   - lopressor 25 mg bid   - reconcile outpatient medication upon discharge   - Hold losartan in MAYRA and until reconciled

## 2017-04-24 NOTE — H&P ADULT. - PROBLEM SELECTOR PLAN 5
with hyperglycemia , initially > 600  imrpoved with insulin , will restart lantus and short acting  with decreased dosing and increase as tolerates   - lantus   - lispro  Ac   - correctional scale   - f/u A1 c

## 2017-04-24 NOTE — H&P ADULT. - HISTORY OF PRESENT ILLNESS
Patient is a 51 year old male with a past medical history significant for CKD s/p renal transplant (Dec 2013), DM , HTN, HLD, obesity, CARMEN, CVa ( 2015) w/ residual sensory deficit on left upper extremity,  presents with shortness of breath and cough for the past week.   For the past few months patient reports ongoing hypoxia with saturations in the high 80's on puls oximeter, he has been undergoing outpatient work up including  pulmonary function testing and had a CT chest  both of which the results are pending. Symptoms have remained constant ,however, during the past week he reports his symptoms have acutely worsened. He reports a dry nonproductive cough associated with chest pain. He has increased dyspnea on exertion and decreased exercise tolerance. He reports his parents had the Flu about three weeks prior to admission. He reports chills  but no fevers. He has no dysuria and no fould smelling urine. He has no abdominal pain or GI symptoms.  Of note, He recently returned from [place] which was about a 6-7 hour car ride. He has increased b/l  LE edema and also reports orthopnea.

## 2017-04-24 NOTE — H&P ADULT. - PROBLEM SELECTOR PLAN 2
farshad r/o vte given recent prologned immobilization and travel , undergoing outpatient w/u ,  - f/u vascular duplex lower ext   -  V/Q scan   - cont supplemental O2   - nocturnal  CPAP  - Duoneb  q6h

## 2017-04-24 NOTE — ED ADULT NURSE REASSESSMENT NOTE - NS ED NURSE REASSESS COMMENT FT1
patient is resting comfortably in bed. patients work of breathing has improved. states feeling less sob. vs have improved. md aware. will continue to monitor.
patient is sleeping comfortably in bed. patient is slightly febrile. md aware. will continue to monitor.
returned from break to see the patient was intubated. patient wife told me after the procedure the patient suddenly became very lethargic and difficult to arouse. wife stated she noticed his O2 sat was very low and she called for help. rn covering break stated an attempt was made to use bipap to assist with the patients breathing although it was unsuccessful and the md chose to intubate. tube placement was confirmed by md via cxr and color change on the capnogropher. patient is currently on the ventilator on the  with O2 sat monitoring and family at bedside.
0730: vss. pt is intubated at 23 cm at the lip line. Pt undressed and repositioned to change sheets. Respiratory at bedside to maintain safety of ETT/OGT tube. Benitez catheter inserted using sterile technique. Second RN present to confirm sterility. Bedside drainage to gravity. Stat lock in place. Pt is sedated with fentanyl at 2 mcg. Rectal temperature was 104.3. Made md aware and ofirmev given. Pt also had +3 pitting edema in his bilat legs. Skin w/d/intact. Pt on CM: NSR. o2 sat is between 95-98%. Wife at bedside. Safety and comfort measures maintained.

## 2017-04-24 NOTE — H&P ADULT. - MUSCULOSKELETAL
details… detailed exam no joint warmth/ROM intact/no joint swelling/no joint erythema/no calf tenderness

## 2017-04-24 NOTE — H&P ADULT. - ASSESSMENT
51 M w/pmh ESRD  s/p renal transplant (Dec 2013) on immunosuppressive therapy , DM , HTN, HLD, obesity, CARMEN, CVa p/w acute worsening of Dyspnea and cough, febrile on exam, with wheezes and hypoxia, labs with MAYRA and hyperglycemia , parainfluenza positive,  CT with tree in bud opacities.

## 2017-04-24 NOTE — H&P ADULT. - PROBLEM SELECTOR PLAN 3
Discussed case with renal fellow, currently HD stable will therefore continue immuno suppression   - continue imuran   - cont tacrolimus   - monitor tacro level   - cont h2 blocker   - cont Bactrim PPX dosing   - f/u renal consult

## 2017-04-24 NOTE — H&P ADULT. - NEGATIVE GASTROINTESTINAL SYMPTOMS
no change in bowel habits/no nausea/no melena/no hematochezia/no vomiting/no constipation/no diarrhea

## 2017-04-24 NOTE — H&P ADULT. - PROBLEM SELECTOR PLAN 4
discussed with renal fellow, nancy from acute infection   - monitor ins and outs   - gentle hydration   - f/u renal consult

## 2017-04-24 NOTE — H&P ADULT. - PMH
CKD (chronic kidney disease)    CVA (cerebral vascular accident)    Diabetes mellitus    Diabetic peripheral neuropathy    Hyperlipidemia    Hypertension    Obesity (BMI 30-39.9)

## 2017-04-25 LAB
ALBUMIN SERPL ELPH-MCNC: 3 G/DL — LOW (ref 3.3–5)
ALP SERPL-CCNC: 69 U/L — SIGNIFICANT CHANGE UP (ref 40–120)
ALT FLD-CCNC: 13 U/L RC — SIGNIFICANT CHANGE UP (ref 10–45)
ANION GAP SERPL CALC-SCNC: 12 MMOL/L — SIGNIFICANT CHANGE UP (ref 5–17)
APTT BLD: 31.8 SEC — SIGNIFICANT CHANGE UP (ref 27.5–37.4)
AST SERPL-CCNC: 20 U/L — SIGNIFICANT CHANGE UP (ref 10–40)
BILIRUB SERPL-MCNC: 0.9 MG/DL — SIGNIFICANT CHANGE UP (ref 0.2–1.2)
BUN SERPL-MCNC: 22 MG/DL — SIGNIFICANT CHANGE UP (ref 7–23)
CALCIUM SERPL-MCNC: 8.1 MG/DL — LOW (ref 8.4–10.5)
CHLORIDE SERPL-SCNC: 99 MMOL/L — SIGNIFICANT CHANGE UP (ref 96–108)
CO2 SERPL-SCNC: 31 MMOL/L — SIGNIFICANT CHANGE UP (ref 22–31)
CREAT SERPL-MCNC: 1.36 MG/DL — HIGH (ref 0.5–1.3)
CULTURE RESULTS: NO GROWTH — SIGNIFICANT CHANGE UP
GAS PNL BLDA: SIGNIFICANT CHANGE UP
GAS PNL BLDA: SIGNIFICANT CHANGE UP
GLUCOSE SERPL-MCNC: 107 MG/DL — HIGH (ref 70–99)
HCT VFR BLD CALC: 48.7 % — SIGNIFICANT CHANGE UP (ref 39–50)
HGB BLD-MCNC: 15.5 G/DL — SIGNIFICANT CHANGE UP (ref 13–17)
INR BLD: 1.3 RATIO — HIGH (ref 0.88–1.16)
LEGIONELLA AB SER-ACNC: NEGATIVE — SIGNIFICANT CHANGE UP
MAGNESIUM SERPL-MCNC: 1.8 MG/DL — SIGNIFICANT CHANGE UP (ref 1.6–2.6)
MCHC RBC-ENTMCNC: 29.6 PG — SIGNIFICANT CHANGE UP (ref 27–34)
MCHC RBC-ENTMCNC: 31.8 GM/DL — LOW (ref 32–36)
MCV RBC AUTO: 93.1 FL — SIGNIFICANT CHANGE UP (ref 80–100)
PHOSPHATE SERPL-MCNC: 3.4 MG/DL — SIGNIFICANT CHANGE UP (ref 2.5–4.5)
PLATELET # BLD AUTO: 140 K/UL — LOW (ref 150–400)
POTASSIUM SERPL-MCNC: 4.3 MMOL/L — SIGNIFICANT CHANGE UP (ref 3.5–5.3)
POTASSIUM SERPL-SCNC: 4.3 MMOL/L — SIGNIFICANT CHANGE UP (ref 3.5–5.3)
PROT SERPL-MCNC: 5.8 G/DL — LOW (ref 6–8.3)
PROTHROM AB SERPL-ACNC: 14.2 SEC — HIGH (ref 9.8–12.7)
RBC # BLD: 5.23 M/UL — SIGNIFICANT CHANGE UP (ref 4.2–5.8)
RBC # FLD: 14.1 % — SIGNIFICANT CHANGE UP (ref 10.3–14.5)
SODIUM SERPL-SCNC: 142 MMOL/L — SIGNIFICANT CHANGE UP (ref 135–145)
SPECIMEN SOURCE: SIGNIFICANT CHANGE UP
WBC # BLD: 11.6 K/UL — HIGH (ref 3.8–10.5)
WBC # FLD AUTO: 11.6 K/UL — HIGH (ref 3.8–10.5)

## 2017-04-25 PROCEDURE — 71010: CPT | Mod: 26

## 2017-04-25 PROCEDURE — 99291 CRITICAL CARE FIRST HOUR: CPT | Mod: 25

## 2017-04-25 PROCEDURE — 93312 ECHO TRANSESOPHAGEAL: CPT | Mod: 26,GC

## 2017-04-25 PROCEDURE — 99233 SBSQ HOSP IP/OBS HIGH 50: CPT

## 2017-04-25 RX ORDER — HEPARIN SODIUM 5000 [USP'U]/ML
5000 INJECTION INTRAVENOUS; SUBCUTANEOUS EVERY 8 HOURS
Qty: 0 | Refills: 0 | Status: DISCONTINUED | OUTPATIENT
Start: 2017-04-25 | End: 2017-04-27

## 2017-04-25 RX ORDER — HUMAN INSULIN 100 [IU]/ML
20 INJECTION, SUSPENSION SUBCUTANEOUS
Qty: 0 | Refills: 0 | Status: DISCONTINUED | OUTPATIENT
Start: 2017-04-25 | End: 2017-04-28

## 2017-04-25 RX ORDER — CISATRACURIUM BESYLATE 2 MG/ML
20 INJECTION INTRAVENOUS ONCE
Qty: 0 | Refills: 0 | Status: COMPLETED | OUTPATIENT
Start: 2017-04-25 | End: 2017-04-25

## 2017-04-25 RX ADMIN — PROPOFOL 2 MICROGRAM(S)/KG/MIN: 10 INJECTION, EMULSION INTRAVENOUS at 04:41

## 2017-04-25 RX ADMIN — PIPERACILLIN AND TAZOBACTAM 25 GRAM(S): 4; .5 INJECTION, POWDER, LYOPHILIZED, FOR SOLUTION INTRAVENOUS at 05:01

## 2017-04-25 RX ADMIN — PHENYLEPHRINE HYDROCHLORIDE 2 MICROGRAM(S)/KG/MIN: 10 INJECTION INTRAVENOUS at 04:40

## 2017-04-25 RX ADMIN — PIPERACILLIN AND TAZOBACTAM 25 GRAM(S): 4; .5 INJECTION, POWDER, LYOPHILIZED, FOR SOLUTION INTRAVENOUS at 21:00

## 2017-04-25 RX ADMIN — Medication 3 MILLILITER(S): at 23:32

## 2017-04-25 RX ADMIN — Medication 325 MILLIGRAM(S): at 12:42

## 2017-04-25 RX ADMIN — Medication 250 MILLIGRAM(S): at 17:07

## 2017-04-25 RX ADMIN — FENTANYL CITRATE 14.36 MICROGRAM(S)/KG/HR: 50 INJECTION INTRAVENOUS at 04:40

## 2017-04-25 RX ADMIN — Medication 3 MILLILITER(S): at 17:13

## 2017-04-25 RX ADMIN — Medication 1 TABLET(S): at 12:42

## 2017-04-25 RX ADMIN — PIPERACILLIN AND TAZOBACTAM 25 GRAM(S): 4; .5 INJECTION, POWDER, LYOPHILIZED, FOR SOLUTION INTRAVENOUS at 13:30

## 2017-04-25 RX ADMIN — Medication 250 MILLIGRAM(S): at 05:01

## 2017-04-25 RX ADMIN — HEPARIN SODIUM 5000 UNIT(S): 5000 INJECTION INTRAVENOUS; SUBCUTANEOUS at 09:21

## 2017-04-25 RX ADMIN — Medication 1: at 05:33

## 2017-04-25 RX ADMIN — ATORVASTATIN CALCIUM 20 MILLIGRAM(S): 80 TABLET, FILM COATED ORAL at 21:00

## 2017-04-25 RX ADMIN — HEPARIN SODIUM 5000 UNIT(S): 5000 INJECTION INTRAVENOUS; SUBCUTANEOUS at 17:07

## 2017-04-25 RX ADMIN — SODIUM CHLORIDE 75 MILLILITER(S): 9 INJECTION INTRAMUSCULAR; INTRAVENOUS; SUBCUTANEOUS at 00:00

## 2017-04-25 RX ADMIN — Medication 3 MILLILITER(S): at 12:22

## 2017-04-25 RX ADMIN — CISATRACURIUM BESYLATE 20 MILLIGRAM(S): 2 INJECTION INTRAVENOUS at 11:57

## 2017-04-25 RX ADMIN — Medication 3 MILLILITER(S): at 06:03

## 2017-04-25 RX ADMIN — PANTOPRAZOLE SODIUM 40 MILLIGRAM(S): 20 TABLET, DELAYED RELEASE ORAL at 12:42

## 2017-04-25 NOTE — DIETITIAN INITIAL EVALUATION ADULT. - OTHER INFO
Pt seen for: consult for A1c >7.0   Adm dx:  respiratory failure, PNA, septic shock, hypotension, intubated 4/24   GI issues: no N/V/D    Last BM: soft BM yesterday   Food allergies: NKFA  Vit/supplement PTA: none noted

## 2017-04-25 NOTE — DIETITIAN INITIAL EVALUATION ADULT. - NS AS NUTRI INTERV ENTERAL NUTRITION3
provide hypocaloric EN. recommend Vital 1.2 goal 75cc/hr x 18 hrs (1620 kcals, 101gm protein, 1095cc free water) and Prosource 2oz/day (120 kcals, 30 gm protein), together will provide 1740 kcals for 12 kcals/kg dosing wt of 143.6 kg and 131 protein for 1.6 gm protein/kg IBW of 83.6 kg

## 2017-04-25 NOTE — DIETITIAN INITIAL EVALUATION ADULT. - ENERGY NEEDS
Ht: 73"  Wt: 316  BMI: 41.8 kg/m2   IBW: 184 (+/-10%)   172%IBW  Edema: 2+ generalized   Skin: no pressure injuries.  Energy needs estimated using 11-14 kcals/kg dosing wt of 143.6kg for 1580-2010kcals/day.

## 2017-04-26 LAB
ALBUMIN SERPL ELPH-MCNC: 2.5 G/DL — LOW (ref 3.3–5)
ALP SERPL-CCNC: 63 U/L — SIGNIFICANT CHANGE UP (ref 40–120)
ALT FLD-CCNC: 13 U/L RC — SIGNIFICANT CHANGE UP (ref 10–45)
ANION GAP SERPL CALC-SCNC: 12 MMOL/L — SIGNIFICANT CHANGE UP (ref 5–17)
APPEARANCE UR: ABNORMAL
APTT BLD: 28.2 SEC — SIGNIFICANT CHANGE UP (ref 27.5–37.4)
AST SERPL-CCNC: 35 U/L — SIGNIFICANT CHANGE UP (ref 10–40)
BACTERIA # UR AUTO: ABNORMAL /HPF
BASOPHILS # BLD AUTO: 0 K/UL — SIGNIFICANT CHANGE UP (ref 0–0.2)
BASOPHILS NFR BLD AUTO: 0.1 % — SIGNIFICANT CHANGE UP (ref 0–2)
BILIRUB SERPL-MCNC: 0.8 MG/DL — SIGNIFICANT CHANGE UP (ref 0.2–1.2)
BILIRUB UR-MCNC: NEGATIVE — SIGNIFICANT CHANGE UP
BUN SERPL-MCNC: 21 MG/DL — SIGNIFICANT CHANGE UP (ref 7–23)
CALCIUM SERPL-MCNC: 8.1 MG/DL — LOW (ref 8.4–10.5)
CHLORIDE SERPL-SCNC: 98 MMOL/L — SIGNIFICANT CHANGE UP (ref 96–108)
CO2 SERPL-SCNC: 26 MMOL/L — SIGNIFICANT CHANGE UP (ref 22–31)
COLOR SPEC: YELLOW — SIGNIFICANT CHANGE UP
CREAT SERPL-MCNC: 1.12 MG/DL — SIGNIFICANT CHANGE UP (ref 0.5–1.3)
DIFF PNL FLD: ABNORMAL
EOSINOPHIL # BLD AUTO: 0.2 K/UL — SIGNIFICANT CHANGE UP (ref 0–0.5)
EOSINOPHIL NFR BLD AUTO: 2.1 % — SIGNIFICANT CHANGE UP (ref 0–6)
EPI CELLS # UR: SIGNIFICANT CHANGE UP /HPF
GAS PNL BLDA: SIGNIFICANT CHANGE UP
GAS PNL BLDA: SIGNIFICANT CHANGE UP
GLUCOSE SERPL-MCNC: 264 MG/DL — HIGH (ref 70–99)
GLUCOSE UR QL: >1000
HCT VFR BLD CALC: 42.9 % — SIGNIFICANT CHANGE UP (ref 39–50)
HGB BLD-MCNC: 14.4 G/DL — SIGNIFICANT CHANGE UP (ref 13–17)
HYALINE CASTS # UR AUTO: ABNORMAL
INR BLD: 1.13 RATIO — SIGNIFICANT CHANGE UP (ref 0.88–1.16)
KETONES UR-MCNC: ABNORMAL
LEUKOCYTE ESTERASE UR-ACNC: NEGATIVE — SIGNIFICANT CHANGE UP
LYMPHOCYTES # BLD AUTO: 0.9 K/UL — LOW (ref 1–3.3)
LYMPHOCYTES # BLD AUTO: 11.4 % — LOW (ref 13–44)
MAGNESIUM SERPL-MCNC: 2.1 MG/DL — SIGNIFICANT CHANGE UP (ref 1.6–2.6)
MCHC RBC-ENTMCNC: 31 PG — SIGNIFICANT CHANGE UP (ref 27–34)
MCHC RBC-ENTMCNC: 33.6 GM/DL — SIGNIFICANT CHANGE UP (ref 32–36)
MCV RBC AUTO: 92.3 FL — SIGNIFICANT CHANGE UP (ref 80–100)
MONOCYTES # BLD AUTO: 0.5 K/UL — SIGNIFICANT CHANGE UP (ref 0–0.9)
MONOCYTES NFR BLD AUTO: 6.7 % — SIGNIFICANT CHANGE UP (ref 2–14)
NEUTROPHILS # BLD AUTO: 6.2 K/UL — SIGNIFICANT CHANGE UP (ref 1.8–7.4)
NEUTROPHILS NFR BLD AUTO: 79.6 % — HIGH (ref 43–77)
NITRITE UR-MCNC: NEGATIVE — SIGNIFICANT CHANGE UP
PH UR: 6.5 — SIGNIFICANT CHANGE UP (ref 5–8)
PHOSPHATE SERPL-MCNC: 2.9 MG/DL — SIGNIFICANT CHANGE UP (ref 2.5–4.5)
PLATELET # BLD AUTO: 129 K/UL — LOW (ref 150–400)
POTASSIUM SERPL-MCNC: 4.3 MMOL/L — SIGNIFICANT CHANGE UP (ref 3.5–5.3)
POTASSIUM SERPL-SCNC: 4.3 MMOL/L — SIGNIFICANT CHANGE UP (ref 3.5–5.3)
PROT SERPL-MCNC: 5.4 G/DL — LOW (ref 6–8.3)
PROT UR-MCNC: 100 MG/DL
PROTHROM AB SERPL-ACNC: 12.2 SEC — SIGNIFICANT CHANGE UP (ref 9.8–12.7)
RBC # BLD: 4.65 M/UL — SIGNIFICANT CHANGE UP (ref 4.2–5.8)
RBC # FLD: 14 % — SIGNIFICANT CHANGE UP (ref 10.3–14.5)
RBC CASTS # UR COMP ASSIST: ABNORMAL /HPF (ref 0–2)
SODIUM SERPL-SCNC: 136 MMOL/L — SIGNIFICANT CHANGE UP (ref 135–145)
SP GR SPEC: 1.02 — SIGNIFICANT CHANGE UP (ref 1.01–1.02)
TACROLIMUS SERPL-MCNC: <2 NG/ML — SIGNIFICANT CHANGE UP
UROBILINOGEN FLD QL: NEGATIVE — SIGNIFICANT CHANGE UP
VANCOMYCIN TROUGH SERPL-MCNC: 9.6 UG/ML — LOW (ref 10–20)
WBC # BLD: 7.8 K/UL — SIGNIFICANT CHANGE UP (ref 3.8–10.5)
WBC # FLD AUTO: 7.8 K/UL — SIGNIFICANT CHANGE UP (ref 3.8–10.5)
WBC UR QL: SIGNIFICANT CHANGE UP /HPF (ref 0–5)

## 2017-04-26 PROCEDURE — 99291 CRITICAL CARE FIRST HOUR: CPT

## 2017-04-26 PROCEDURE — 99233 SBSQ HOSP IP/OBS HIGH 50: CPT

## 2017-04-26 RX ORDER — DEXMEDETOMIDINE HYDROCHLORIDE IN 0.9% SODIUM CHLORIDE 4 UG/ML
0.2 INJECTION INTRAVENOUS
Qty: 200 | Refills: 0 | Status: DISCONTINUED | OUTPATIENT
Start: 2017-04-26 | End: 2017-04-26

## 2017-04-26 RX ORDER — SODIUM CHLORIDE 9 MG/ML
1000 INJECTION INTRAMUSCULAR; INTRAVENOUS; SUBCUTANEOUS ONCE
Qty: 0 | Refills: 0 | Status: COMPLETED | OUTPATIENT
Start: 2017-04-26 | End: 2017-04-26

## 2017-04-26 RX ORDER — POLYETHYLENE GLYCOL 3350 17 G/17G
17 POWDER, FOR SOLUTION ORAL
Qty: 0 | Refills: 0 | Status: DISCONTINUED | OUTPATIENT
Start: 2017-04-26 | End: 2017-04-27

## 2017-04-26 RX ORDER — ACETAMINOPHEN 500 MG
1000 TABLET ORAL ONCE
Qty: 0 | Refills: 0 | Status: COMPLETED | OUTPATIENT
Start: 2017-04-26 | End: 2017-04-26

## 2017-04-26 RX ORDER — AZATHIOPRINE 100 MG/1
100 TABLET ORAL DAILY
Qty: 0 | Refills: 0 | Status: DISCONTINUED | OUTPATIENT
Start: 2017-04-26 | End: 2017-04-27

## 2017-04-26 RX ORDER — HYDRALAZINE HCL 50 MG
10 TABLET ORAL ONCE
Qty: 0 | Refills: 0 | Status: COMPLETED | OUTPATIENT
Start: 2017-04-26 | End: 2017-04-26

## 2017-04-26 RX ORDER — SENNA PLUS 8.6 MG/1
5 TABLET ORAL DAILY
Qty: 0 | Refills: 0 | Status: DISCONTINUED | OUTPATIENT
Start: 2017-04-26 | End: 2017-04-27

## 2017-04-26 RX ORDER — LABETALOL HCL 100 MG
10 TABLET ORAL ONCE
Qty: 0 | Refills: 0 | Status: COMPLETED | OUTPATIENT
Start: 2017-04-26 | End: 2017-04-26

## 2017-04-26 RX ORDER — TACROLIMUS 5 MG/1
1.5 CAPSULE ORAL
Qty: 0 | Refills: 0 | Status: DISCONTINUED | OUTPATIENT
Start: 2017-04-26 | End: 2017-04-27

## 2017-04-26 RX ORDER — DEXMEDETOMIDINE HYDROCHLORIDE IN 0.9% SODIUM CHLORIDE 4 UG/ML
0.2 INJECTION INTRAVENOUS
Qty: 200 | Refills: 0 | Status: DISCONTINUED | OUTPATIENT
Start: 2017-04-26 | End: 2017-04-27

## 2017-04-26 RX ORDER — INSULIN LISPRO 100/ML
VIAL (ML) SUBCUTANEOUS EVERY 4 HOURS
Qty: 0 | Refills: 0 | Status: DISCONTINUED | OUTPATIENT
Start: 2017-04-26 | End: 2017-04-27

## 2017-04-26 RX ORDER — NICARDIPINE HYDROCHLORIDE 30 MG/1
3 CAPSULE, EXTENDED RELEASE ORAL
Qty: 40 | Refills: 0 | Status: DISCONTINUED | OUTPATIENT
Start: 2017-04-26 | End: 2017-04-26

## 2017-04-26 RX ORDER — HYDRALAZINE HCL 50 MG
20 TABLET ORAL ONCE
Qty: 0 | Refills: 0 | Status: COMPLETED | OUTPATIENT
Start: 2017-04-26 | End: 2017-04-26

## 2017-04-26 RX ADMIN — FENTANYL CITRATE 14.36 MICROGRAM(S)/KG/HR: 50 INJECTION INTRAVENOUS at 03:08

## 2017-04-26 RX ADMIN — Medication 3 MILLILITER(S): at 05:19

## 2017-04-26 RX ADMIN — Medication 4: at 21:17

## 2017-04-26 RX ADMIN — SODIUM CHLORIDE 1000 MILLILITER(S): 9 INJECTION INTRAMUSCULAR; INTRAVENOUS; SUBCUTANEOUS at 13:40

## 2017-04-26 RX ADMIN — HEPARIN SODIUM 5000 UNIT(S): 5000 INJECTION INTRAVENOUS; SUBCUTANEOUS at 17:04

## 2017-04-26 RX ADMIN — Medication 3: at 09:52

## 2017-04-26 RX ADMIN — PIPERACILLIN AND TAZOBACTAM 25 GRAM(S): 4; .5 INJECTION, POWDER, LYOPHILIZED, FOR SOLUTION INTRAVENOUS at 05:16

## 2017-04-26 RX ADMIN — Medication 3: at 02:57

## 2017-04-26 RX ADMIN — Medication 3 MILLILITER(S): at 11:41

## 2017-04-26 RX ADMIN — SODIUM CHLORIDE 75 MILLILITER(S): 9 INJECTION INTRAMUSCULAR; INTRAVENOUS; SUBCUTANEOUS at 03:07

## 2017-04-26 RX ADMIN — PIPERACILLIN AND TAZOBACTAM 25 GRAM(S): 4; .5 INJECTION, POWDER, LYOPHILIZED, FOR SOLUTION INTRAVENOUS at 13:47

## 2017-04-26 RX ADMIN — Medication 4: at 17:03

## 2017-04-26 RX ADMIN — PROPOFOL 2 MICROGRAM(S)/KG/MIN: 10 INJECTION, EMULSION INTRAVENOUS at 03:08

## 2017-04-26 RX ADMIN — Medication 3 MILLILITER(S): at 17:07

## 2017-04-26 RX ADMIN — HEPARIN SODIUM 5000 UNIT(S): 5000 INJECTION INTRAVENOUS; SUBCUTANEOUS at 09:52

## 2017-04-26 RX ADMIN — Medication 4: at 13:54

## 2017-04-26 RX ADMIN — Medication 20 MILLIGRAM(S): at 12:33

## 2017-04-26 RX ADMIN — Medication 10 MILLIGRAM(S): at 12:18

## 2017-04-26 RX ADMIN — Medication 2: at 05:16

## 2017-04-26 RX ADMIN — Medication 10 MILLIGRAM(S): at 13:55

## 2017-04-26 RX ADMIN — Medication 3 MILLILITER(S): at 23:40

## 2017-04-26 RX ADMIN — Medication 250 MILLIGRAM(S): at 05:15

## 2017-04-26 RX ADMIN — Medication 400 MILLIGRAM(S): at 13:39

## 2017-04-26 RX ADMIN — POLYETHYLENE GLYCOL 3350 17 GRAM(S): 17 POWDER, FOR SOLUTION ORAL at 05:26

## 2017-04-26 RX ADMIN — PANTOPRAZOLE SODIUM 40 MILLIGRAM(S): 20 TABLET, DELAYED RELEASE ORAL at 11:55

## 2017-04-26 RX ADMIN — Medication 10 MILLIGRAM(S): at 22:11

## 2017-04-26 RX ADMIN — HEPARIN SODIUM 5000 UNIT(S): 5000 INJECTION INTRAVENOUS; SUBCUTANEOUS at 02:57

## 2017-04-26 RX ADMIN — PIPERACILLIN AND TAZOBACTAM 25 GRAM(S): 4; .5 INJECTION, POWDER, LYOPHILIZED, FOR SOLUTION INTRAVENOUS at 21:17

## 2017-04-26 RX ADMIN — HUMAN INSULIN 20 UNIT(S): 100 INJECTION, SUSPENSION SUBCUTANEOUS at 17:03

## 2017-04-26 RX ADMIN — HUMAN INSULIN 20 UNIT(S): 100 INJECTION, SUSPENSION SUBCUTANEOUS at 05:16

## 2017-04-27 LAB
ALBUMIN SERPL ELPH-MCNC: 2.9 G/DL — LOW (ref 3.3–5)
ALP SERPL-CCNC: 74 U/L — SIGNIFICANT CHANGE UP (ref 40–120)
ALT FLD-CCNC: 22 U/L RC — SIGNIFICANT CHANGE UP (ref 10–45)
ANION GAP SERPL CALC-SCNC: 12 MMOL/L — SIGNIFICANT CHANGE UP (ref 5–17)
AST SERPL-CCNC: 57 U/L — HIGH (ref 10–40)
BASOPHILS # BLD AUTO: 0 K/UL — SIGNIFICANT CHANGE UP (ref 0–0.2)
BASOPHILS NFR BLD AUTO: 0 % — SIGNIFICANT CHANGE UP (ref 0–2)
BILIRUB SERPL-MCNC: 1 MG/DL — SIGNIFICANT CHANGE UP (ref 0.2–1.2)
BUN SERPL-MCNC: 13 MG/DL — SIGNIFICANT CHANGE UP (ref 7–23)
CALCIUM SERPL-MCNC: 9 MG/DL — SIGNIFICANT CHANGE UP (ref 8.4–10.5)
CHLORIDE SERPL-SCNC: 99 MMOL/L — SIGNIFICANT CHANGE UP (ref 96–108)
CO2 SERPL-SCNC: 28 MMOL/L — SIGNIFICANT CHANGE UP (ref 22–31)
CREAT SERPL-MCNC: 0.84 MG/DL — SIGNIFICANT CHANGE UP (ref 0.5–1.3)
EOSINOPHIL # BLD AUTO: 0 K/UL — SIGNIFICANT CHANGE UP (ref 0–0.5)
EOSINOPHIL NFR BLD AUTO: 0.1 % — SIGNIFICANT CHANGE UP (ref 0–6)
GAS PNL BLDA: SIGNIFICANT CHANGE UP
GLUCOSE SERPL-MCNC: 229 MG/DL — HIGH (ref 70–99)
GRAM STN FLD: SIGNIFICANT CHANGE UP
HCT VFR BLD CALC: 47 % — SIGNIFICANT CHANGE UP (ref 39–50)
HGB BLD-MCNC: 15.4 G/DL — SIGNIFICANT CHANGE UP (ref 13–17)
LYMPHOCYTES # BLD AUTO: 0.4 K/UL — LOW (ref 1–3.3)
LYMPHOCYTES # BLD AUTO: 6.3 % — LOW (ref 13–44)
MAGNESIUM SERPL-MCNC: 2 MG/DL — SIGNIFICANT CHANGE UP (ref 1.6–2.6)
MCHC RBC-ENTMCNC: 29.9 PG — SIGNIFICANT CHANGE UP (ref 27–34)
MCHC RBC-ENTMCNC: 32.8 GM/DL — SIGNIFICANT CHANGE UP (ref 32–36)
MCV RBC AUTO: 91 FL — SIGNIFICANT CHANGE UP (ref 80–100)
MONOCYTES # BLD AUTO: 0.5 K/UL — SIGNIFICANT CHANGE UP (ref 0–0.9)
MONOCYTES NFR BLD AUTO: 6.9 % — SIGNIFICANT CHANGE UP (ref 2–14)
NEUTROPHILS # BLD AUTO: 6 K/UL — SIGNIFICANT CHANGE UP (ref 1.8–7.4)
NEUTROPHILS NFR BLD AUTO: 86.6 % — HIGH (ref 43–77)
PHOSPHATE SERPL-MCNC: 2.5 MG/DL — SIGNIFICANT CHANGE UP (ref 2.5–4.5)
PLATELET # BLD AUTO: 131 K/UL — LOW (ref 150–400)
POTASSIUM SERPL-MCNC: 4.5 MMOL/L — SIGNIFICANT CHANGE UP (ref 3.5–5.3)
POTASSIUM SERPL-SCNC: 4.5 MMOL/L — SIGNIFICANT CHANGE UP (ref 3.5–5.3)
PROT SERPL-MCNC: 6.3 G/DL — SIGNIFICANT CHANGE UP (ref 6–8.3)
RBC # BLD: 5.16 M/UL — SIGNIFICANT CHANGE UP (ref 4.2–5.8)
RBC # FLD: 13.9 % — SIGNIFICANT CHANGE UP (ref 10.3–14.5)
SODIUM SERPL-SCNC: 139 MMOL/L — SIGNIFICANT CHANGE UP (ref 135–145)
WBC # BLD: 6.9 K/UL — SIGNIFICANT CHANGE UP (ref 3.8–10.5)
WBC # FLD AUTO: 6.9 K/UL — SIGNIFICANT CHANGE UP (ref 3.8–10.5)

## 2017-04-27 PROCEDURE — 99233 SBSQ HOSP IP/OBS HIGH 50: CPT

## 2017-04-27 PROCEDURE — 99291 CRITICAL CARE FIRST HOUR: CPT

## 2017-04-27 RX ORDER — HEPARIN SODIUM 5000 [USP'U]/ML
7500 INJECTION INTRAVENOUS; SUBCUTANEOUS EVERY 8 HOURS
Qty: 0 | Refills: 0 | Status: DISCONTINUED | OUTPATIENT
Start: 2017-04-27 | End: 2017-04-30

## 2017-04-27 RX ORDER — HYDRALAZINE HCL 50 MG
10 TABLET ORAL ONCE
Qty: 0 | Refills: 0 | Status: COMPLETED | OUTPATIENT
Start: 2017-04-27 | End: 2017-04-27

## 2017-04-27 RX ORDER — POLYETHYLENE GLYCOL 3350 17 G/17G
17 POWDER, FOR SOLUTION ORAL
Qty: 0 | Refills: 0 | Status: DISCONTINUED | OUTPATIENT
Start: 2017-04-27 | End: 2017-05-09

## 2017-04-27 RX ORDER — MORPHINE SULFATE 50 MG/1
2 CAPSULE, EXTENDED RELEASE ORAL ONCE
Qty: 0 | Refills: 0 | Status: DISCONTINUED | OUTPATIENT
Start: 2017-04-27 | End: 2017-04-27

## 2017-04-27 RX ORDER — ATORVASTATIN CALCIUM 80 MG/1
20 TABLET, FILM COATED ORAL AT BEDTIME
Qty: 0 | Refills: 0 | Status: DISCONTINUED | OUTPATIENT
Start: 2017-04-27 | End: 2017-05-09

## 2017-04-27 RX ORDER — INSULIN LISPRO 100/ML
VIAL (ML) SUBCUTANEOUS EVERY 4 HOURS
Qty: 0 | Refills: 0 | Status: DISCONTINUED | OUTPATIENT
Start: 2017-04-27 | End: 2017-04-28

## 2017-04-27 RX ORDER — LABETALOL HCL 100 MG
10 TABLET ORAL ONCE
Qty: 0 | Refills: 0 | Status: COMPLETED | OUTPATIENT
Start: 2017-04-27 | End: 2017-04-27

## 2017-04-27 RX ORDER — METOPROLOL TARTRATE 50 MG
50 TABLET ORAL
Qty: 0 | Refills: 0 | Status: DISCONTINUED | OUTPATIENT
Start: 2017-04-27 | End: 2017-04-28

## 2017-04-27 RX ORDER — AZATHIOPRINE 100 MG/1
100 TABLET ORAL DAILY
Qty: 0 | Refills: 0 | Status: DISCONTINUED | OUTPATIENT
Start: 2017-04-27 | End: 2017-04-27

## 2017-04-27 RX ORDER — TACROLIMUS 5 MG/1
1.5 CAPSULE ORAL
Qty: 0 | Refills: 0 | Status: DISCONTINUED | OUTPATIENT
Start: 2017-04-27 | End: 2017-05-04

## 2017-04-27 RX ORDER — ACETAMINOPHEN 500 MG
650 TABLET ORAL EVERY 6 HOURS
Qty: 0 | Refills: 0 | Status: DISCONTINUED | OUTPATIENT
Start: 2017-04-27 | End: 2017-05-09

## 2017-04-27 RX ORDER — MORPHINE SULFATE 50 MG/1
1 CAPSULE, EXTENDED RELEASE ORAL ONCE
Qty: 0 | Refills: 0 | Status: DISCONTINUED | OUTPATIENT
Start: 2017-04-27 | End: 2017-04-27

## 2017-04-27 RX ORDER — SENNA PLUS 8.6 MG/1
5 TABLET ORAL DAILY
Qty: 0 | Refills: 0 | Status: DISCONTINUED | OUTPATIENT
Start: 2017-04-27 | End: 2017-05-09

## 2017-04-27 RX ORDER — AMLODIPINE BESYLATE 2.5 MG/1
5 TABLET ORAL DAILY
Qty: 0 | Refills: 0 | Status: DISCONTINUED | OUTPATIENT
Start: 2017-04-27 | End: 2017-04-28

## 2017-04-27 RX ADMIN — DEXMEDETOMIDINE HYDROCHLORIDE IN 0.9% SODIUM CHLORIDE 7.18 MICROGRAM(S)/KG/HR: 4 INJECTION INTRAVENOUS at 05:11

## 2017-04-27 RX ADMIN — Medication 8: at 21:47

## 2017-04-27 RX ADMIN — HEPARIN SODIUM 7500 UNIT(S): 5000 INJECTION INTRAVENOUS; SUBCUTANEOUS at 21:40

## 2017-04-27 RX ADMIN — Medication 325 MILLIGRAM(S): at 11:02

## 2017-04-27 RX ADMIN — Medication 10 MILLIGRAM(S): at 09:50

## 2017-04-27 RX ADMIN — MORPHINE SULFATE 2 MILLIGRAM(S): 50 CAPSULE, EXTENDED RELEASE ORAL at 11:52

## 2017-04-27 RX ADMIN — Medication 10 MILLIGRAM(S): at 05:10

## 2017-04-27 RX ADMIN — Medication 2: at 05:37

## 2017-04-27 RX ADMIN — TACROLIMUS 1.5 MILLIGRAM(S): 5 CAPSULE ORAL at 17:26

## 2017-04-27 RX ADMIN — Medication 8: at 17:28

## 2017-04-27 RX ADMIN — Medication 10 MILLIGRAM(S): at 10:11

## 2017-04-27 RX ADMIN — Medication 8: at 14:15

## 2017-04-27 RX ADMIN — MORPHINE SULFATE 2 MILLIGRAM(S): 50 CAPSULE, EXTENDED RELEASE ORAL at 12:03

## 2017-04-27 RX ADMIN — PIPERACILLIN AND TAZOBACTAM 25 GRAM(S): 4; .5 INJECTION, POWDER, LYOPHILIZED, FOR SOLUTION INTRAVENOUS at 05:05

## 2017-04-27 RX ADMIN — Medication 650 MILLIGRAM(S): at 10:37

## 2017-04-27 RX ADMIN — AZATHIOPRINE 100 MILLIGRAM(S): 100 TABLET ORAL at 11:02

## 2017-04-27 RX ADMIN — PIPERACILLIN AND TAZOBACTAM 25 GRAM(S): 4; .5 INJECTION, POWDER, LYOPHILIZED, FOR SOLUTION INTRAVENOUS at 14:06

## 2017-04-27 RX ADMIN — PANTOPRAZOLE SODIUM 40 MILLIGRAM(S): 20 TABLET, DELAYED RELEASE ORAL at 11:02

## 2017-04-27 RX ADMIN — PIPERACILLIN AND TAZOBACTAM 25 GRAM(S): 4; .5 INJECTION, POWDER, LYOPHILIZED, FOR SOLUTION INTRAVENOUS at 21:41

## 2017-04-27 RX ADMIN — MORPHINE SULFATE 1 MILLIGRAM(S): 50 CAPSULE, EXTENDED RELEASE ORAL at 15:15

## 2017-04-27 RX ADMIN — HUMAN INSULIN 20 UNIT(S): 100 INJECTION, SUSPENSION SUBCUTANEOUS at 05:06

## 2017-04-27 RX ADMIN — Medication 50 MILLIGRAM(S): at 12:15

## 2017-04-27 RX ADMIN — Medication 4: at 09:10

## 2017-04-27 RX ADMIN — Medication 3 MILLILITER(S): at 18:34

## 2017-04-27 RX ADMIN — HEPARIN SODIUM 7500 UNIT(S): 5000 INJECTION INTRAVENOUS; SUBCUTANEOUS at 14:06

## 2017-04-27 RX ADMIN — Medication 3 MILLILITER(S): at 11:57

## 2017-04-27 RX ADMIN — Medication 10 MILLIGRAM(S): at 22:57

## 2017-04-27 RX ADMIN — AMLODIPINE BESYLATE 5 MILLIGRAM(S): 2.5 TABLET ORAL at 12:15

## 2017-04-27 RX ADMIN — Medication 4: at 02:40

## 2017-04-27 RX ADMIN — Medication 1 TABLET(S): at 11:02

## 2017-04-27 RX ADMIN — Medication 650 MILLIGRAM(S): at 12:03

## 2017-04-27 RX ADMIN — MORPHINE SULFATE 1 MILLIGRAM(S): 50 CAPSULE, EXTENDED RELEASE ORAL at 15:06

## 2017-04-27 RX ADMIN — Medication 3 MILLILITER(S): at 23:22

## 2017-04-27 RX ADMIN — HEPARIN SODIUM 7500 UNIT(S): 5000 INJECTION INTRAVENOUS; SUBCUTANEOUS at 05:05

## 2017-04-27 RX ADMIN — Medication 50 MILLIGRAM(S): at 21:41

## 2017-04-27 RX ADMIN — ATORVASTATIN CALCIUM 20 MILLIGRAM(S): 80 TABLET, FILM COATED ORAL at 21:41

## 2017-04-27 RX ADMIN — Medication 3 MILLILITER(S): at 05:46

## 2017-04-27 RX ADMIN — HUMAN INSULIN 20 UNIT(S): 100 INJECTION, SUSPENSION SUBCUTANEOUS at 17:27

## 2017-04-28 LAB
ALBUMIN SERPL ELPH-MCNC: 2.9 G/DL — LOW (ref 3.3–5)
ALP SERPL-CCNC: 66 U/L — SIGNIFICANT CHANGE UP (ref 40–120)
ALT FLD-CCNC: 19 U/L RC — SIGNIFICANT CHANGE UP (ref 10–45)
ANION GAP SERPL CALC-SCNC: 15 MMOL/L — SIGNIFICANT CHANGE UP (ref 5–17)
AST SERPL-CCNC: 41 U/L — HIGH (ref 10–40)
BILIRUB SERPL-MCNC: 0.7 MG/DL — SIGNIFICANT CHANGE UP (ref 0.2–1.2)
BUN SERPL-MCNC: 12 MG/DL — SIGNIFICANT CHANGE UP (ref 7–23)
CALCIUM SERPL-MCNC: 9.3 MG/DL — SIGNIFICANT CHANGE UP (ref 8.4–10.5)
CHLORIDE SERPL-SCNC: 95 MMOL/L — LOW (ref 96–108)
CO2 SERPL-SCNC: 28 MMOL/L — SIGNIFICANT CHANGE UP (ref 22–31)
CREAT SERPL-MCNC: 0.83 MG/DL — SIGNIFICANT CHANGE UP (ref 0.5–1.3)
CULTURE RESULTS: SIGNIFICANT CHANGE UP
GAS PNL BLDA: SIGNIFICANT CHANGE UP
GLUCOSE SERPL-MCNC: 224 MG/DL — HIGH (ref 70–99)
HCT VFR BLD CALC: 46.9 % — SIGNIFICANT CHANGE UP (ref 39–50)
HGB BLD-MCNC: 14.9 G/DL — SIGNIFICANT CHANGE UP (ref 13–17)
MAGNESIUM SERPL-MCNC: 2 MG/DL — SIGNIFICANT CHANGE UP (ref 1.6–2.6)
MCHC RBC-ENTMCNC: 28.9 PG — SIGNIFICANT CHANGE UP (ref 27–34)
MCHC RBC-ENTMCNC: 31.7 GM/DL — LOW (ref 32–36)
MCV RBC AUTO: 91.2 FL — SIGNIFICANT CHANGE UP (ref 80–100)
PHOSPHATE SERPL-MCNC: 2.4 MG/DL — LOW (ref 2.5–4.5)
PLATELET # BLD AUTO: 151 K/UL — SIGNIFICANT CHANGE UP (ref 150–400)
POTASSIUM SERPL-MCNC: 3.9 MMOL/L — SIGNIFICANT CHANGE UP (ref 3.5–5.3)
POTASSIUM SERPL-SCNC: 3.9 MMOL/L — SIGNIFICANT CHANGE UP (ref 3.5–5.3)
PROT SERPL-MCNC: 6.5 G/DL — SIGNIFICANT CHANGE UP (ref 6–8.3)
RBC # BLD: 5.14 M/UL — SIGNIFICANT CHANGE UP (ref 4.2–5.8)
RBC # FLD: 13.6 % — SIGNIFICANT CHANGE UP (ref 10.3–14.5)
SODIUM SERPL-SCNC: 138 MMOL/L — SIGNIFICANT CHANGE UP (ref 135–145)
SPECIMEN SOURCE: SIGNIFICANT CHANGE UP
WBC # BLD: 7.3 K/UL — SIGNIFICANT CHANGE UP (ref 3.8–10.5)
WBC # FLD AUTO: 7.3 K/UL — SIGNIFICANT CHANGE UP (ref 3.8–10.5)

## 2017-04-28 PROCEDURE — 99291 CRITICAL CARE FIRST HOUR: CPT

## 2017-04-28 PROCEDURE — 99223 1ST HOSP IP/OBS HIGH 75: CPT | Mod: GC

## 2017-04-28 PROCEDURE — 99233 SBSQ HOSP IP/OBS HIGH 50: CPT

## 2017-04-28 RX ORDER — LABETALOL HCL 100 MG
10 TABLET ORAL ONCE
Qty: 0 | Refills: 0 | Status: COMPLETED | OUTPATIENT
Start: 2017-04-28 | End: 2017-04-28

## 2017-04-28 RX ORDER — INSULIN LISPRO 100/ML
VIAL (ML) SUBCUTANEOUS
Qty: 0 | Refills: 0 | Status: DISCONTINUED | OUTPATIENT
Start: 2017-04-28 | End: 2017-05-09

## 2017-04-28 RX ORDER — INSULIN GLARGINE 100 [IU]/ML
40 INJECTION, SOLUTION SUBCUTANEOUS AT BEDTIME
Qty: 0 | Refills: 0 | Status: DISCONTINUED | OUTPATIENT
Start: 2017-04-28 | End: 2017-04-30

## 2017-04-28 RX ORDER — INSULIN LISPRO 100/ML
13 VIAL (ML) SUBCUTANEOUS
Qty: 0 | Refills: 0 | Status: DISCONTINUED | OUTPATIENT
Start: 2017-04-28 | End: 2017-05-01

## 2017-04-28 RX ORDER — AMLODIPINE BESYLATE 2.5 MG/1
10 TABLET ORAL DAILY
Qty: 0 | Refills: 0 | Status: DISCONTINUED | OUTPATIENT
Start: 2017-04-28 | End: 2017-05-08

## 2017-04-28 RX ORDER — MORPHINE SULFATE 50 MG/1
1 CAPSULE, EXTENDED RELEASE ORAL ONCE
Qty: 0 | Refills: 0 | Status: DISCONTINUED | OUTPATIENT
Start: 2017-04-28 | End: 2017-04-28

## 2017-04-28 RX ORDER — INSULIN LISPRO 100/ML
5 VIAL (ML) SUBCUTANEOUS
Qty: 0 | Refills: 0 | Status: DISCONTINUED | OUTPATIENT
Start: 2017-04-28 | End: 2017-04-28

## 2017-04-28 RX ORDER — AMLODIPINE BESYLATE 2.5 MG/1
5 TABLET ORAL ONCE
Qty: 0 | Refills: 0 | Status: COMPLETED | OUTPATIENT
Start: 2017-04-28 | End: 2017-04-28

## 2017-04-28 RX ORDER — MORPHINE SULFATE 50 MG/1
2 CAPSULE, EXTENDED RELEASE ORAL ONCE
Qty: 0 | Refills: 0 | Status: DISCONTINUED | OUTPATIENT
Start: 2017-04-28 | End: 2017-04-28

## 2017-04-28 RX ORDER — METOPROLOL TARTRATE 50 MG
25 TABLET ORAL ONCE
Qty: 0 | Refills: 0 | Status: COMPLETED | OUTPATIENT
Start: 2017-04-28 | End: 2017-04-28

## 2017-04-28 RX ORDER — INSULIN LISPRO 100/ML
VIAL (ML) SUBCUTANEOUS AT BEDTIME
Qty: 0 | Refills: 0 | Status: DISCONTINUED | OUTPATIENT
Start: 2017-04-28 | End: 2017-05-09

## 2017-04-28 RX ORDER — METOPROLOL TARTRATE 50 MG
75 TABLET ORAL
Qty: 0 | Refills: 0 | Status: DISCONTINUED | OUTPATIENT
Start: 2017-04-28 | End: 2017-05-09

## 2017-04-28 RX ORDER — HYDRALAZINE HCL 50 MG
10 TABLET ORAL ONCE
Qty: 0 | Refills: 0 | Status: COMPLETED | OUTPATIENT
Start: 2017-04-28 | End: 2017-04-28

## 2017-04-28 RX ORDER — HYDRALAZINE HCL 50 MG
50 TABLET ORAL THREE TIMES A DAY
Qty: 0 | Refills: 0 | Status: DISCONTINUED | OUTPATIENT
Start: 2017-04-28 | End: 2017-04-29

## 2017-04-28 RX ORDER — INSULIN GLARGINE 100 [IU]/ML
30 INJECTION, SOLUTION SUBCUTANEOUS AT BEDTIME
Qty: 0 | Refills: 0 | Status: DISCONTINUED | OUTPATIENT
Start: 2017-04-28 | End: 2017-04-28

## 2017-04-28 RX ORDER — POTASSIUM PHOSPHATE, MONOBASIC POTASSIUM PHOSPHATE, DIBASIC 236; 224 MG/ML; MG/ML
15 INJECTION, SOLUTION INTRAVENOUS ONCE
Qty: 0 | Refills: 0 | Status: COMPLETED | OUTPATIENT
Start: 2017-04-28 | End: 2017-04-28

## 2017-04-28 RX ORDER — FUROSEMIDE 40 MG
40 TABLET ORAL ONCE
Qty: 0 | Refills: 0 | Status: DISCONTINUED | OUTPATIENT
Start: 2017-04-28 | End: 2017-04-28

## 2017-04-28 RX ADMIN — Medication 50 MILLIGRAM(S): at 22:02

## 2017-04-28 RX ADMIN — PANTOPRAZOLE SODIUM 40 MILLIGRAM(S): 20 TABLET, DELAYED RELEASE ORAL at 09:06

## 2017-04-28 RX ADMIN — Medication 5 UNIT(S): at 13:33

## 2017-04-28 RX ADMIN — ATORVASTATIN CALCIUM 20 MILLIGRAM(S): 80 TABLET, FILM COATED ORAL at 22:02

## 2017-04-28 RX ADMIN — Medication 10 MILLIGRAM(S): at 10:05

## 2017-04-28 RX ADMIN — AMLODIPINE BESYLATE 5 MILLIGRAM(S): 2.5 TABLET ORAL at 11:22

## 2017-04-28 RX ADMIN — Medication 3 MILLILITER(S): at 12:16

## 2017-04-28 RX ADMIN — Medication 3 MILLILITER(S): at 17:14

## 2017-04-28 RX ADMIN — Medication 650 MILLIGRAM(S): at 12:51

## 2017-04-28 RX ADMIN — HUMAN INSULIN 20 UNIT(S): 100 INJECTION, SUSPENSION SUBCUTANEOUS at 05:54

## 2017-04-28 RX ADMIN — Medication 0.1 MILLIGRAM(S): at 13:39

## 2017-04-28 RX ADMIN — PIPERACILLIN AND TAZOBACTAM 25 GRAM(S): 4; .5 INJECTION, POWDER, LYOPHILIZED, FOR SOLUTION INTRAVENOUS at 13:33

## 2017-04-28 RX ADMIN — Medication 10 MILLIGRAM(S): at 00:18

## 2017-04-28 RX ADMIN — Medication 4: at 13:27

## 2017-04-28 RX ADMIN — HEPARIN SODIUM 7500 UNIT(S): 5000 INJECTION INTRAVENOUS; SUBCUTANEOUS at 05:53

## 2017-04-28 RX ADMIN — Medication 4: at 09:00

## 2017-04-28 RX ADMIN — POTASSIUM PHOSPHATE, MONOBASIC POTASSIUM PHOSPHATE, DIBASIC 62.5 MILLIMOLE(S): 236; 224 INJECTION, SOLUTION INTRAVENOUS at 05:54

## 2017-04-28 RX ADMIN — Medication 325 MILLIGRAM(S): at 09:06

## 2017-04-28 RX ADMIN — TACROLIMUS 1.5 MILLIGRAM(S): 5 CAPSULE ORAL at 18:30

## 2017-04-28 RX ADMIN — Medication 5 UNIT(S): at 09:00

## 2017-04-28 RX ADMIN — MORPHINE SULFATE 2 MILLIGRAM(S): 50 CAPSULE, EXTENDED RELEASE ORAL at 03:00

## 2017-04-28 RX ADMIN — Medication 3 MILLILITER(S): at 05:16

## 2017-04-28 RX ADMIN — Medication 10 MILLIGRAM(S): at 07:17

## 2017-04-28 RX ADMIN — AMLODIPINE BESYLATE 5 MILLIGRAM(S): 2.5 TABLET ORAL at 05:54

## 2017-04-28 RX ADMIN — Medication 25 MILLIGRAM(S): at 10:05

## 2017-04-28 RX ADMIN — Medication 4: at 05:54

## 2017-04-28 RX ADMIN — Medication 650 MILLIGRAM(S): at 13:30

## 2017-04-28 RX ADMIN — Medication 4: at 02:59

## 2017-04-28 RX ADMIN — Medication 13 UNIT(S): at 18:38

## 2017-04-28 RX ADMIN — Medication 10 MILLIGRAM(S): at 01:40

## 2017-04-28 RX ADMIN — Medication 10 MILLIGRAM(S): at 04:13

## 2017-04-28 RX ADMIN — HEPARIN SODIUM 7500 UNIT(S): 5000 INJECTION INTRAVENOUS; SUBCUTANEOUS at 13:27

## 2017-04-28 RX ADMIN — Medication 75 MILLIGRAM(S): at 18:29

## 2017-04-28 RX ADMIN — Medication 2: at 18:38

## 2017-04-28 RX ADMIN — INSULIN GLARGINE 40 UNIT(S): 100 INJECTION, SOLUTION SUBCUTANEOUS at 22:02

## 2017-04-28 RX ADMIN — TACROLIMUS 1.5 MILLIGRAM(S): 5 CAPSULE ORAL at 05:55

## 2017-04-28 RX ADMIN — PIPERACILLIN AND TAZOBACTAM 25 GRAM(S): 4; .5 INJECTION, POWDER, LYOPHILIZED, FOR SOLUTION INTRAVENOUS at 05:53

## 2017-04-28 RX ADMIN — Medication 1 TABLET(S): at 09:06

## 2017-04-28 RX ADMIN — MORPHINE SULFATE 2 MILLIGRAM(S): 50 CAPSULE, EXTENDED RELEASE ORAL at 02:35

## 2017-04-28 RX ADMIN — HEPARIN SODIUM 7500 UNIT(S): 5000 INJECTION INTRAVENOUS; SUBCUTANEOUS at 23:47

## 2017-04-28 RX ADMIN — PIPERACILLIN AND TAZOBACTAM 25 GRAM(S): 4; .5 INJECTION, POWDER, LYOPHILIZED, FOR SOLUTION INTRAVENOUS at 23:48

## 2017-04-28 NOTE — SWALLOW BEDSIDE ASSESSMENT ADULT - ASR SWALLOW ASPIRATION MONITOR
change of breathing pattern/fever/*If evident, report to MD immediately and d/c oral diet../cough/gurgly voice/throat clearing

## 2017-04-28 NOTE — PROVIDER CONTACT NOTE (OTHER) - ASSESSMENT
PT is asymptomatic. P PT is asymptomatic. PT received Lopressor at 6pm and 5mg Norvasc at 11:22. PT does not have any hypertensive medications ordered for the evening.

## 2017-04-28 NOTE — SWALLOW BEDSIDE ASSESSMENT ADULT - ASR SWALLOW RECOMMEND DIAG
Should MD suspect clinical aspiration, objective testing would be warranted (ie MBS vs FEES) however given use of hi flow 02 the Pt is not a candidate for these tests at this time.

## 2017-04-28 NOTE — SWALLOW BEDSIDE ASSESSMENT ADULT - SWALLOW EVAL: DIAGNOSIS
Pt presents with overtly functional oropharyngeal swallowing skills.  No signs or symptoms of laryngeal penetration/aspiration evident with any consistency presented.  Pharyngeal swallow judged to be timely with adequate laryngeal elevation.  However, silent aspiration not r/o at bedside.

## 2017-04-28 NOTE — SWALLOW BEDSIDE ASSESSMENT ADULT - SLP PERTINENT HISTORY OF CURRENT PROBLEM
Patient is a 51 year old male with a past medical history significant for CKD s/p renal transplant (Dec 2013), DM , HTN, HLD, obesity, CARMEN, CVA ( 2015) w/ residual sensory deficit on left upper extremity,  presents with shortness of breath and cough for the past week.  4/24/17 Pt intubated.  4/26/17 Pt extubated; placed on Bipap then High flow NC.  Bipap at night.  4/27/17 Pt c/o inability to swallow following extubation; swallowing evaluation ordered.  Bipap at night. Patient is a 51 year old male with a past medical history significant for CKD s/p renal transplant (Dec 2013), DM , HTN, HLD, obesity, CARMEN, CVA ( 2015) w/ residual sensory deficit on left upper extremity,  presents with shortness of breath and cough for the past week.  4/24/17 Pt intubated.  4/25/17 Renal f/u: holding all immunosuppresion in setting of septic shock; renal fxn ok.  MD f/u: +parainfluenza, hypoxic hypercapneic respiratory failure.   4/26/17 Vent weaning trial; Pt extubated; placed on Bipap then High flow NC.  Bipap at night.  4/27/17 Pt c/o inability to swallow following extubation; swallowing evaluation ordered.  Bipap at night.  Per MD Sepsis/Parainfluenza/Pna-improved on Zosyn

## 2017-04-28 NOTE — SWALLOW BEDSIDE ASSESSMENT ADULT - DIET PRIOR TO ADMI
Pt reportedly tolerating a regular texture diet despite h/o dysphagia following a stroke; as per Pt report he is s/p PEG placement and removal as he started eating a regular texture diet a few months ago (approximately 6 months).    Pt stated that he received therapy at [facility] with repeat MBS studies which determined ability to tolerate oral feedings.

## 2017-04-28 NOTE — SWALLOW BEDSIDE ASSESSMENT ADULT - PHARYNGEAL PHASE
No signs or symptoms of laryngeal penetration/aspiration evident with any consistency presented.  Pharyngeal swallow judged to be timely with adequate laryngeal elevation. No signs or symptoms of laryngeal penetration/aspiration evident.  Pharyngeal swallow judged to be timely with adequate laryngeal elevation.

## 2017-04-28 NOTE — SWALLOW BEDSIDE ASSESSMENT ADULT - COMMENTS
4/23/17 CT Chest: IMPRESSION:   Focal tree-in-bud airspace opacities within the left medial lower lobe,   may represent focal infection.  Hypodense left thyroid nodules measuring up to 1.8 cm, several of which   contain coarse calcifications. Nonemergent thyroid ultrasound may be   performed for further evaluation.    4/24/17 CXR; IMPRESSION:  Endotracheal tube in place with tip below the thoracic inlet and above   the malorie.  Right basilar subsegmental atelectasis.  Indication: NG tube placement.    4/25/17 CXR: Impression:  The heart is normal in size. The left costophrenic angle is not included   in this study. Poor inspiratory effort. No acute consolidation.   Endotracheal tube and NG tube are in good position. 4/23/17 CT Chest: IMPRESSION: Focal tree-in-bud airspace opacities within the left medial lower lobe, may represent focal infection.Hypodense left thyroid nodules measuring up to 1.8 cm, several of which contain coarse calcifications. Nonemergent thyroid ultrasound may be performed for further evaluation.  4/24/17 CXR; IMPRESSION: Endotracheal tube in place with tip below the thoracic inlet and above the malorie.  Right basilar subsegmental atelectasis.  Indication: NG tube placement.  4/25/17 CXR: Impression: The heart is normal in size. The left costophrenic angle is not included in this study. Poor inspiratory effort. No acute consolidation. Endotracheal tube and NG tube are in good position.    Pt known to this Dept from previous admission.  11/19/15 MBS revealed: a profound pharyngeal dysphagia characterized by absent bolus propulsion into the UES and absent epiglottic retroflexion. Upon multiple repeat swallow attempts x14, there was minimal propulsion thru the UES into the cervical esophagus and resultant laryngeal penetration/aspiration of residue from the hypopharynx. Head rotations to the right/left appeared to worsen the swallow profile. Post evaluation pt provided with suctioning via [place]auer.  Disorders: reduced hyo-laryngeal excursion, reduced laryngeal closure, reduced pharyngeal contractility, signs of reduced supraglottic sensation, and signs of reduced subglottic sensation.  NPO, with non-oral nutrition/hydration/medications. Consider long terms means of alternate nutrition/hydration/medication.  3/15/16 MBS completed at [facility]. 4/23/17 CT Chest: IMPRESSION: Focal tree-in-bud airspace opacities within the left medial lower lobe, may represent focal infection.  Hypodense left thyroid nodules measuring up to 1.8 cm, several of which contain coarse calcifications. Nonemergent thyroid ultrasound may be performed for further evaluation.  4/24/17 CXR; IMPRESSION: Endotracheal tube in place with tip below the thoracic inlet and above the malorie.  Right basilar subsegmental atelectasis.  Indication: NG tube placement.  4/25/17 CXR: Impression: The heart is normal in size. The left costophrenic angle is not included in this study. Poor inspiratory effort. No acute consolidation. Endotracheal tube and NG tube are in good position.    Pt known to this Dept from previous admission.  11/19/15 MBS revealed: a profound pharyngeal dysphagia characterized by absent bolus propulsion into the UES and absent epiglottic retroflexion. Upon multiple repeat swallow attempts x14, there was minimal propulsion thru the UES into the cervical esophagus and resultant laryngeal penetration/aspiration of residue from the hypopharynx. Head rotations to the right/left appeared to worsen the swallow profile. Post evaluation pt provided with suctioning via Yankauer.  Disorders: reduced hyo-laryngeal excursion, reduced laryngeal closure, reduced pharyngeal contractility, signs of reduced supraglottic sensation, and signs of reduced subglottic sensation.  NPO, with non-oral nutrition/hydration/medications. Consider long terms means of alternate nutrition/hydration/medication.  3/15/16 MBS completed at [facility].

## 2017-04-28 NOTE — SWALLOW BEDSIDE ASSESSMENT ADULT - SLP GENERAL OBSERVATIONS
Pt awake, alert and cooperative; oriented x 3.  Pt denies difficulty swallowing; Pt denies s/s of aspiration with breakfast this am.  Upon encounter SP02 92% but increased to 96% as exam continued.  Pt reports h/o CVA with need for PEG; Pt reports that PEG was d/c approximately 6 months ago and states that he has been tolerating regular texture diet since then.  Pt reports h/o swallowing therapy at [place] with outpt tx and MBSs.

## 2017-04-29 LAB
ALBUMIN SERPL ELPH-MCNC: 3.1 G/DL — LOW (ref 3.3–5)
ALP SERPL-CCNC: 63 U/L — SIGNIFICANT CHANGE UP (ref 40–120)
ALT FLD-CCNC: 25 U/L RC — SIGNIFICANT CHANGE UP (ref 10–45)
ANION GAP SERPL CALC-SCNC: 11 MMOL/L — SIGNIFICANT CHANGE UP (ref 5–17)
ANION GAP SERPL CALC-SCNC: 13 MMOL/L — SIGNIFICANT CHANGE UP (ref 5–17)
APTT BLD: 26.4 SEC — LOW (ref 27.5–37.4)
AST SERPL-CCNC: 36 U/L — SIGNIFICANT CHANGE UP (ref 10–40)
BASOPHILS # BLD AUTO: 0.02 K/UL — SIGNIFICANT CHANGE UP (ref 0–0.2)
BASOPHILS NFR BLD AUTO: 0.5 % — SIGNIFICANT CHANGE UP (ref 0–2)
BILIRUB SERPL-MCNC: 0.6 MG/DL — SIGNIFICANT CHANGE UP (ref 0.2–1.2)
BUN SERPL-MCNC: 10 MG/DL — SIGNIFICANT CHANGE UP (ref 7–23)
BUN SERPL-MCNC: 10 MG/DL — SIGNIFICANT CHANGE UP (ref 7–23)
CALCIUM SERPL-MCNC: 8.9 MG/DL — SIGNIFICANT CHANGE UP (ref 8.4–10.5)
CALCIUM SERPL-MCNC: 9.4 MG/DL — SIGNIFICANT CHANGE UP (ref 8.4–10.5)
CHLORIDE SERPL-SCNC: 94 MMOL/L — LOW (ref 96–108)
CHLORIDE SERPL-SCNC: 97 MMOL/L — SIGNIFICANT CHANGE UP (ref 96–108)
CO2 SERPL-SCNC: 32 MMOL/L — HIGH (ref 22–31)
CO2 SERPL-SCNC: 35 MMOL/L — HIGH (ref 22–31)
CREAT SERPL-MCNC: 0.8 MG/DL — SIGNIFICANT CHANGE UP (ref 0.5–1.3)
CREAT SERPL-MCNC: 0.88 MG/DL — SIGNIFICANT CHANGE UP (ref 0.5–1.3)
CULTURE RESULTS: SIGNIFICANT CHANGE UP
CULTURE RESULTS: SIGNIFICANT CHANGE UP
EOSINOPHIL # BLD AUTO: 0.06 K/UL — SIGNIFICANT CHANGE UP (ref 0–0.5)
EOSINOPHIL NFR BLD AUTO: 1.4 % — SIGNIFICANT CHANGE UP (ref 0–6)
GAS PNL BLDA: SIGNIFICANT CHANGE UP
GLUCOSE SERPL-MCNC: 201 MG/DL — HIGH (ref 70–99)
GLUCOSE SERPL-MCNC: 255 MG/DL — HIGH (ref 70–99)
HCT VFR BLD CALC: 48.3 % — SIGNIFICANT CHANGE UP (ref 39–50)
HGB BLD-MCNC: 15 G/DL — SIGNIFICANT CHANGE UP (ref 13–17)
IMM GRANULOCYTES NFR BLD AUTO: 0.2 % — SIGNIFICANT CHANGE UP (ref 0–1.5)
INR BLD: 0.93 RATIO — SIGNIFICANT CHANGE UP (ref 0.88–1.16)
LDH SERPL L TO P-CCNC: 227 U/L — SIGNIFICANT CHANGE UP (ref 50–242)
LYMPHOCYTES # BLD AUTO: 0.6 K/UL — LOW (ref 1–3.3)
LYMPHOCYTES # BLD AUTO: 13.6 % — SIGNIFICANT CHANGE UP (ref 13–44)
MAGNESIUM SERPL-MCNC: 1.7 MG/DL — SIGNIFICANT CHANGE UP (ref 1.6–2.6)
MAGNESIUM SERPL-MCNC: 1.8 MG/DL — SIGNIFICANT CHANGE UP (ref 1.6–2.6)
MCHC RBC-ENTMCNC: 28.6 PG — SIGNIFICANT CHANGE UP (ref 27–34)
MCHC RBC-ENTMCNC: 31.1 GM/DL — LOW (ref 32–36)
MCV RBC AUTO: 92 FL — SIGNIFICANT CHANGE UP (ref 80–100)
MONOCYTES # BLD AUTO: 0.52 K/UL — SIGNIFICANT CHANGE UP (ref 0–0.9)
MONOCYTES NFR BLD AUTO: 11.8 % — SIGNIFICANT CHANGE UP (ref 2–14)
NEUTROPHILS # BLD AUTO: 3.21 K/UL — SIGNIFICANT CHANGE UP (ref 1.8–7.4)
NEUTROPHILS NFR BLD AUTO: 72.5 % — SIGNIFICANT CHANGE UP (ref 43–77)
PHOSPHATE SERPL-MCNC: 3 MG/DL — SIGNIFICANT CHANGE UP (ref 2.5–4.5)
PHOSPHATE SERPL-MCNC: 3.9 MG/DL — SIGNIFICANT CHANGE UP (ref 2.5–4.5)
PLATELET # BLD AUTO: 177 K/UL — SIGNIFICANT CHANGE UP (ref 150–400)
POTASSIUM SERPL-MCNC: 3.6 MMOL/L — SIGNIFICANT CHANGE UP (ref 3.5–5.3)
POTASSIUM SERPL-MCNC: 3.9 MMOL/L — SIGNIFICANT CHANGE UP (ref 3.5–5.3)
POTASSIUM SERPL-SCNC: 3.6 MMOL/L — SIGNIFICANT CHANGE UP (ref 3.5–5.3)
POTASSIUM SERPL-SCNC: 3.9 MMOL/L — SIGNIFICANT CHANGE UP (ref 3.5–5.3)
PROT SERPL-MCNC: 6.7 G/DL — SIGNIFICANT CHANGE UP (ref 6–8.3)
PROTHROM AB SERPL-ACNC: 10.5 SEC — SIGNIFICANT CHANGE UP (ref 10–13.1)
RBC # BLD: 5.25 M/UL — SIGNIFICANT CHANGE UP (ref 4.2–5.8)
RBC # FLD: 13.8 % — SIGNIFICANT CHANGE UP (ref 10.3–14.5)
SODIUM SERPL-SCNC: 140 MMOL/L — SIGNIFICANT CHANGE UP (ref 135–145)
SODIUM SERPL-SCNC: 142 MMOL/L — SIGNIFICANT CHANGE UP (ref 135–145)
SPECIMEN SOURCE: SIGNIFICANT CHANGE UP
SPECIMEN SOURCE: SIGNIFICANT CHANGE UP
TACROLIMUS SERPL-MCNC: 4.1 NG/ML — SIGNIFICANT CHANGE UP
WBC # BLD: 4.42 K/UL — SIGNIFICANT CHANGE UP (ref 3.8–10.5)
WBC # FLD AUTO: 4.42 K/UL — SIGNIFICANT CHANGE UP (ref 3.8–10.5)

## 2017-04-29 PROCEDURE — 99233 SBSQ HOSP IP/OBS HIGH 50: CPT | Mod: GC

## 2017-04-29 PROCEDURE — 71010: CPT | Mod: 26

## 2017-04-29 RX ORDER — HYDRALAZINE HCL 50 MG
75 TABLET ORAL THREE TIMES A DAY
Qty: 0 | Refills: 0 | Status: DISCONTINUED | OUTPATIENT
Start: 2017-04-29 | End: 2017-05-01

## 2017-04-29 RX ORDER — POTASSIUM CHLORIDE 20 MEQ
40 PACKET (EA) ORAL ONCE
Qty: 0 | Refills: 0 | Status: COMPLETED | OUTPATIENT
Start: 2017-04-29 | End: 2017-04-29

## 2017-04-29 RX ORDER — MAGNESIUM SULFATE 500 MG/ML
2 VIAL (ML) INJECTION ONCE
Qty: 0 | Refills: 0 | Status: COMPLETED | OUTPATIENT
Start: 2017-04-29 | End: 2017-04-30

## 2017-04-29 RX ORDER — FUROSEMIDE 40 MG
20 TABLET ORAL ONCE
Qty: 0 | Refills: 0 | Status: COMPLETED | OUTPATIENT
Start: 2017-04-29 | End: 2017-04-29

## 2017-04-29 RX ORDER — PIPERACILLIN AND TAZOBACTAM 4; .5 G/20ML; G/20ML
3.38 INJECTION, POWDER, LYOPHILIZED, FOR SOLUTION INTRAVENOUS EVERY 8 HOURS
Qty: 0 | Refills: 0 | Status: DISCONTINUED | OUTPATIENT
Start: 2017-04-29 | End: 2017-05-03

## 2017-04-29 RX ADMIN — Medication 13 UNIT(S): at 09:08

## 2017-04-29 RX ADMIN — HEPARIN SODIUM 7500 UNIT(S): 5000 INJECTION INTRAVENOUS; SUBCUTANEOUS at 14:36

## 2017-04-29 RX ADMIN — Medication 13 UNIT(S): at 12:46

## 2017-04-29 RX ADMIN — HEPARIN SODIUM 7500 UNIT(S): 5000 INJECTION INTRAVENOUS; SUBCUTANEOUS at 21:39

## 2017-04-29 RX ADMIN — Medication 40 MILLIEQUIVALENT(S): at 12:32

## 2017-04-29 RX ADMIN — Medication 3 MILLILITER(S): at 05:35

## 2017-04-29 RX ADMIN — PIPERACILLIN AND TAZOBACTAM 25 GRAM(S): 4; .5 INJECTION, POWDER, LYOPHILIZED, FOR SOLUTION INTRAVENOUS at 14:33

## 2017-04-29 RX ADMIN — Medication 20 MILLIGRAM(S): at 12:33

## 2017-04-29 RX ADMIN — Medication 2: at 18:43

## 2017-04-29 RX ADMIN — Medication 3 MILLILITER(S): at 11:44

## 2017-04-29 RX ADMIN — PIPERACILLIN AND TAZOBACTAM 25 GRAM(S): 4; .5 INJECTION, POWDER, LYOPHILIZED, FOR SOLUTION INTRAVENOUS at 06:15

## 2017-04-29 RX ADMIN — INSULIN GLARGINE 40 UNIT(S): 100 INJECTION, SOLUTION SUBCUTANEOUS at 21:40

## 2017-04-29 RX ADMIN — ATORVASTATIN CALCIUM 20 MILLIGRAM(S): 80 TABLET, FILM COATED ORAL at 21:39

## 2017-04-29 RX ADMIN — Medication 75 MILLIGRAM(S): at 21:41

## 2017-04-29 RX ADMIN — Medication 650 MILLIGRAM(S): at 12:45

## 2017-04-29 RX ADMIN — Medication 75 MILLIGRAM(S): at 14:39

## 2017-04-29 RX ADMIN — TACROLIMUS 1.5 MILLIGRAM(S): 5 CAPSULE ORAL at 18:51

## 2017-04-29 RX ADMIN — AMLODIPINE BESYLATE 10 MILLIGRAM(S): 2.5 TABLET ORAL at 05:44

## 2017-04-29 RX ADMIN — HEPARIN SODIUM 7500 UNIT(S): 5000 INJECTION INTRAVENOUS; SUBCUTANEOUS at 05:44

## 2017-04-29 RX ADMIN — Medication 13 UNIT(S): at 18:43

## 2017-04-29 RX ADMIN — TACROLIMUS 1.5 MILLIGRAM(S): 5 CAPSULE ORAL at 05:44

## 2017-04-29 RX ADMIN — Medication 4: at 09:07

## 2017-04-29 RX ADMIN — Medication 3 MILLILITER(S): at 23:30

## 2017-04-29 RX ADMIN — Medication 3 MILLILITER(S): at 17:10

## 2017-04-29 RX ADMIN — Medication 1 TABLET(S): at 12:31

## 2017-04-29 RX ADMIN — Medication 75 MILLIGRAM(S): at 21:40

## 2017-04-29 RX ADMIN — Medication 325 MILLIGRAM(S): at 12:31

## 2017-04-29 RX ADMIN — Medication 0.1 MILLIGRAM(S): at 10:57

## 2017-04-29 RX ADMIN — Medication 75 MILLIGRAM(S): at 10:58

## 2017-04-29 RX ADMIN — Medication 650 MILLIGRAM(S): at 12:30

## 2017-04-29 RX ADMIN — PANTOPRAZOLE SODIUM 40 MILLIGRAM(S): 20 TABLET, DELAYED RELEASE ORAL at 12:36

## 2017-04-29 RX ADMIN — Medication 50 MILLIGRAM(S): at 05:44

## 2017-04-29 RX ADMIN — Medication 3 MILLILITER(S): at 00:00

## 2017-04-29 RX ADMIN — Medication 0.1 MILLIGRAM(S): at 18:49

## 2017-04-29 RX ADMIN — Medication 20 MILLIGRAM(S): at 20:51

## 2017-04-29 RX ADMIN — PIPERACILLIN AND TAZOBACTAM 25 GRAM(S): 4; .5 INJECTION, POWDER, LYOPHILIZED, FOR SOLUTION INTRAVENOUS at 21:41

## 2017-04-29 RX ADMIN — Medication 2: at 12:46

## 2017-04-29 NOTE — PHYSICAL THERAPY INITIAL EVALUATION ADULT - PERTINENT HX OF CURRENT PROBLEM, REHAB EVAL
51yoM with residual LUE sensation deficit from previous CVA (2015), p/w SOB and cough for past week, CXR 4/25/17 +NG tube, VA duplex 4/24/17 (-) DVT b/l LEs, CT chest 4/23/17, L medial lower lobe PNA, ECG 4/23/17 Tachy

## 2017-04-29 NOTE — PROVIDER CONTACT NOTE (OTHER) - ASSESSMENT
PT BP is 188/102 pluse 74. PT is due for morning medication. Clonidine, Norvasc, hydralazine lopressor

## 2017-04-29 NOTE — PHYSICAL THERAPY INITIAL EVALUATION ADULT - ADDITIONAL COMMENTS
as per pt, resides in a PH with family, 3 CHESTER with rail, 3 Stair indoor with rail, PTA, pt (I) with amb, (I) with ADLs

## 2017-04-30 LAB
ALBUMIN SERPL ELPH-MCNC: 2.9 G/DL — LOW (ref 3.3–5)
ALP SERPL-CCNC: 57 U/L — SIGNIFICANT CHANGE UP (ref 40–120)
ALT FLD-CCNC: 27 U/L RC — SIGNIFICANT CHANGE UP (ref 10–45)
ANION GAP SERPL CALC-SCNC: 11 MMOL/L — SIGNIFICANT CHANGE UP (ref 5–17)
APTT BLD: 76.4 SEC — HIGH (ref 27.5–37.4)
AST SERPL-CCNC: 36 U/L — SIGNIFICANT CHANGE UP (ref 10–40)
BILIRUB SERPL-MCNC: 0.5 MG/DL — SIGNIFICANT CHANGE UP (ref 0.2–1.2)
BUN SERPL-MCNC: 13 MG/DL — SIGNIFICANT CHANGE UP (ref 7–23)
CALCIUM SERPL-MCNC: 9.2 MG/DL — SIGNIFICANT CHANGE UP (ref 8.4–10.5)
CHLORIDE SERPL-SCNC: 95 MMOL/L — LOW (ref 96–108)
CO2 SERPL-SCNC: 33 MMOL/L — HIGH (ref 22–31)
CREAT SERPL-MCNC: 0.94 MG/DL — SIGNIFICANT CHANGE UP (ref 0.5–1.3)
GLUCOSE SERPL-MCNC: 293 MG/DL — HIGH (ref 70–99)
HCT VFR BLD CALC: 49.1 % — SIGNIFICANT CHANGE UP (ref 39–50)
HCT VFR BLD CALC: 50 % — SIGNIFICANT CHANGE UP (ref 39–50)
HGB BLD-MCNC: 15.9 G/DL — SIGNIFICANT CHANGE UP (ref 13–17)
HGB BLD-MCNC: 16.3 G/DL — SIGNIFICANT CHANGE UP (ref 13–17)
MAGNESIUM SERPL-MCNC: 1.7 MG/DL — SIGNIFICANT CHANGE UP (ref 1.6–2.6)
MCHC RBC-ENTMCNC: 29.9 PG — SIGNIFICANT CHANGE UP (ref 27–34)
MCHC RBC-ENTMCNC: 30 PG — SIGNIFICANT CHANGE UP (ref 27–34)
MCHC RBC-ENTMCNC: 32.4 GM/DL — SIGNIFICANT CHANGE UP (ref 32–36)
MCHC RBC-ENTMCNC: 32.6 GM/DL — SIGNIFICANT CHANGE UP (ref 32–36)
MCV RBC AUTO: 92.2 FL — SIGNIFICANT CHANGE UP (ref 80–100)
MCV RBC AUTO: 92.5 FL — SIGNIFICANT CHANGE UP (ref 80–100)
PHOSPHATE SERPL-MCNC: 3.6 MG/DL — SIGNIFICANT CHANGE UP (ref 2.5–4.5)
PLATELET # BLD AUTO: 186 K/UL — SIGNIFICANT CHANGE UP (ref 150–400)
PLATELET # BLD AUTO: 191 K/UL — SIGNIFICANT CHANGE UP (ref 150–400)
POTASSIUM SERPL-MCNC: 4.3 MMOL/L — SIGNIFICANT CHANGE UP (ref 3.5–5.3)
POTASSIUM SERPL-SCNC: 4.3 MMOL/L — SIGNIFICANT CHANGE UP (ref 3.5–5.3)
PROT SERPL-MCNC: 6.2 G/DL — SIGNIFICANT CHANGE UP (ref 6–8.3)
RBC # BLD: 5.31 M/UL — SIGNIFICANT CHANGE UP (ref 4.2–5.8)
RBC # BLD: 5.42 M/UL — SIGNIFICANT CHANGE UP (ref 4.2–5.8)
RBC # FLD: 13.3 % — SIGNIFICANT CHANGE UP (ref 10.3–14.5)
RBC # FLD: 13.3 % — SIGNIFICANT CHANGE UP (ref 10.3–14.5)
SODIUM SERPL-SCNC: 139 MMOL/L — SIGNIFICANT CHANGE UP (ref 135–145)
TACROLIMUS SERPL-MCNC: 6.3 NG/ML — SIGNIFICANT CHANGE UP
WBC # BLD: 5.4 K/UL — SIGNIFICANT CHANGE UP (ref 3.8–10.5)
WBC # BLD: 5.5 K/UL — SIGNIFICANT CHANGE UP (ref 3.8–10.5)
WBC # FLD AUTO: 5.4 K/UL — SIGNIFICANT CHANGE UP (ref 3.8–10.5)
WBC # FLD AUTO: 5.5 K/UL — SIGNIFICANT CHANGE UP (ref 3.8–10.5)

## 2017-04-30 PROCEDURE — 99233 SBSQ HOSP IP/OBS HIGH 50: CPT | Mod: GC

## 2017-04-30 PROCEDURE — 71010: CPT | Mod: 26

## 2017-04-30 PROCEDURE — 93970 EXTREMITY STUDY: CPT | Mod: 26

## 2017-04-30 RX ORDER — HEPARIN SODIUM 5000 [USP'U]/ML
5000 INJECTION INTRAVENOUS; SUBCUTANEOUS EVERY 6 HOURS
Qty: 0 | Refills: 0 | Status: DISCONTINUED | OUTPATIENT
Start: 2017-04-30 | End: 2017-05-06

## 2017-04-30 RX ORDER — INSULIN GLARGINE 100 [IU]/ML
45 INJECTION, SOLUTION SUBCUTANEOUS AT BEDTIME
Qty: 0 | Refills: 0 | Status: DISCONTINUED | OUTPATIENT
Start: 2017-04-30 | End: 2017-05-01

## 2017-04-30 RX ORDER — FUROSEMIDE 40 MG
40 TABLET ORAL DAILY
Qty: 0 | Refills: 0 | Status: DISCONTINUED | OUTPATIENT
Start: 2017-04-30 | End: 2017-04-30

## 2017-04-30 RX ORDER — SODIUM CHLORIDE 9 MG/ML
3 INJECTION INTRAMUSCULAR; INTRAVENOUS; SUBCUTANEOUS EVERY 8 HOURS
Qty: 0 | Refills: 0 | Status: DISCONTINUED | OUTPATIENT
Start: 2017-04-30 | End: 2017-05-09

## 2017-04-30 RX ORDER — FUROSEMIDE 40 MG
20 TABLET ORAL ONCE
Qty: 0 | Refills: 0 | Status: COMPLETED | OUTPATIENT
Start: 2017-04-30 | End: 2017-04-30

## 2017-04-30 RX ORDER — HEPARIN SODIUM 5000 [USP'U]/ML
10000 INJECTION INTRAVENOUS; SUBCUTANEOUS EVERY 6 HOURS
Qty: 0 | Refills: 0 | Status: DISCONTINUED | OUTPATIENT
Start: 2017-04-30 | End: 2017-05-06

## 2017-04-30 RX ORDER — SODIUM CHLORIDE 9 MG/ML
3 INJECTION INTRAMUSCULAR; INTRAVENOUS; SUBCUTANEOUS EVERY 8 HOURS
Qty: 0 | Refills: 0 | Status: DISCONTINUED | OUTPATIENT
Start: 2017-04-30 | End: 2017-04-30

## 2017-04-30 RX ORDER — HEPARIN SODIUM 5000 [USP'U]/ML
INJECTION INTRAVENOUS; SUBCUTANEOUS
Qty: 25000 | Refills: 0 | Status: DISCONTINUED | OUTPATIENT
Start: 2017-04-30 | End: 2017-05-06

## 2017-04-30 RX ADMIN — ATORVASTATIN CALCIUM 20 MILLIGRAM(S): 80 TABLET, FILM COATED ORAL at 22:16

## 2017-04-30 RX ADMIN — SODIUM CHLORIDE 3 MILLILITER(S): 9 INJECTION INTRAMUSCULAR; INTRAVENOUS; SUBCUTANEOUS at 22:14

## 2017-04-30 RX ADMIN — Medication 4: at 09:19

## 2017-04-30 RX ADMIN — Medication 3 MILLILITER(S): at 23:40

## 2017-04-30 RX ADMIN — PANTOPRAZOLE SODIUM 40 MILLIGRAM(S): 20 TABLET, DELAYED RELEASE ORAL at 14:30

## 2017-04-30 RX ADMIN — Medication 13 UNIT(S): at 09:19

## 2017-04-30 RX ADMIN — PIPERACILLIN AND TAZOBACTAM 25 GRAM(S): 4; .5 INJECTION, POWDER, LYOPHILIZED, FOR SOLUTION INTRAVENOUS at 15:10

## 2017-04-30 RX ADMIN — HEPARIN SODIUM 7500 UNIT(S): 5000 INJECTION INTRAVENOUS; SUBCUTANEOUS at 05:57

## 2017-04-30 RX ADMIN — Medication 4: at 14:28

## 2017-04-30 RX ADMIN — Medication 13 UNIT(S): at 14:29

## 2017-04-30 RX ADMIN — AMLODIPINE BESYLATE 10 MILLIGRAM(S): 2.5 TABLET ORAL at 05:57

## 2017-04-30 RX ADMIN — Medication 75 MILLIGRAM(S): at 05:58

## 2017-04-30 RX ADMIN — Medication 75 MILLIGRAM(S): at 14:32

## 2017-04-30 RX ADMIN — Medication 0.1 MILLIGRAM(S): at 19:31

## 2017-04-30 RX ADMIN — Medication 75 MILLIGRAM(S): at 19:32

## 2017-04-30 RX ADMIN — Medication 50 GRAM(S): at 09:22

## 2017-04-30 RX ADMIN — Medication 3 MILLILITER(S): at 11:37

## 2017-04-30 RX ADMIN — TACROLIMUS 1.5 MILLIGRAM(S): 5 CAPSULE ORAL at 07:07

## 2017-04-30 RX ADMIN — Medication 75 MILLIGRAM(S): at 22:15

## 2017-04-30 RX ADMIN — SODIUM CHLORIDE 3 MILLILITER(S): 9 INJECTION INTRAMUSCULAR; INTRAVENOUS; SUBCUTANEOUS at 14:00

## 2017-04-30 RX ADMIN — PIPERACILLIN AND TAZOBACTAM 25 GRAM(S): 4; .5 INJECTION, POWDER, LYOPHILIZED, FOR SOLUTION INTRAVENOUS at 05:59

## 2017-04-30 RX ADMIN — Medication 2: at 18:07

## 2017-04-30 RX ADMIN — Medication 3 MILLILITER(S): at 17:42

## 2017-04-30 RX ADMIN — Medication 20 MILLIGRAM(S): at 20:25

## 2017-04-30 RX ADMIN — INSULIN GLARGINE 45 UNIT(S): 100 INJECTION, SOLUTION SUBCUTANEOUS at 22:16

## 2017-04-30 RX ADMIN — PIPERACILLIN AND TAZOBACTAM 25 GRAM(S): 4; .5 INJECTION, POWDER, LYOPHILIZED, FOR SOLUTION INTRAVENOUS at 22:16

## 2017-04-30 RX ADMIN — Medication 3 MILLILITER(S): at 06:02

## 2017-04-30 RX ADMIN — Medication 75 MILLIGRAM(S): at 05:56

## 2017-04-30 RX ADMIN — HEPARIN SODIUM 2400 UNIT(S)/HR: 5000 INJECTION INTRAVENOUS; SUBCUTANEOUS at 22:17

## 2017-04-30 RX ADMIN — Medication 13 UNIT(S): at 18:07

## 2017-04-30 RX ADMIN — TACROLIMUS 1.5 MILLIGRAM(S): 5 CAPSULE ORAL at 19:29

## 2017-04-30 RX ADMIN — HEPARIN SODIUM 2400 UNIT(S)/HR: 5000 INJECTION INTRAVENOUS; SUBCUTANEOUS at 13:51

## 2017-04-30 RX ADMIN — Medication 0.1 MILLIGRAM(S): at 05:56

## 2017-04-30 RX ADMIN — Medication 1 TABLET(S): at 14:31

## 2017-04-30 RX ADMIN — Medication 325 MILLIGRAM(S): at 14:30

## 2017-04-30 NOTE — OCCUPATIONAL THERAPY INITIAL EVALUATION ADULT - LIVES WITH, PROFILE
private home, 3 steps to enter, split level home, bed/bath 2nd floor, tub shower +grab bars/children/spouse

## 2017-04-30 NOTE — OCCUPATIONAL THERAPY INITIAL EVALUATION ADULT - PLANNED THERAPY INTERVENTIONS, OT EVAL
ADL retraining/bed mobility training/transfer training/strengthening/balance training/cognitive, visual perceptual

## 2017-04-30 NOTE — OCCUPATIONAL THERAPY INITIAL EVALUATION ADULT - PERTINENT HX OF CURRENT PROBLEM, REHAB EVAL
50 y/o male p/w shortness of breath & cough for the past week.  For the past few months, pt reports ongoing hypoxia with saturations in the high 80s on pulse oximeter; pt has been undergoing outpatient work up including pulmonary function testing & had CT chest, both of which results are pending. Symptoms remained constant, however, during the past week, pt reports his symptoms have acutely worsened. Pt reports a dry non productive cough associated with chest pain.  (see below)...

## 2017-04-30 NOTE — OCCUPATIONAL THERAPY INITIAL EVALUATION ADULT - ADDITIONAL COMMENTS
Pt reports chills, but no fevers.  Pt recently returned from [place], which was about a 6-7 hour car ride.  Pt has increased BLE edema & also reports orthopnea.

## 2017-04-30 NOTE — OCCUPATIONAL THERAPY INITIAL EVALUATION ADULT - ORIENTATION, REHAB EVAL
& year, incorrect month (knew it was the last day of the month; however stated March)/person/place/situation

## 2017-05-01 ENCOUNTER — RESULT REVIEW (OUTPATIENT)
Age: 52
End: 2017-05-01

## 2017-05-01 LAB
APPEARANCE UR: ABNORMAL
BACTERIA # UR AUTO: NEGATIVE — SIGNIFICANT CHANGE UP
BILIRUB UR-MCNC: NEGATIVE — SIGNIFICANT CHANGE UP
COLOR SPEC: ABNORMAL
DIFF PNL FLD: ABNORMAL
EPI CELLS # UR: 0 /HPF — SIGNIFICANT CHANGE UP (ref 0–5)
GLUCOSE UR QL: 500 MG/DL
HCT VFR BLD CALC: 50.9 % — HIGH (ref 39–50)
HGB BLD-MCNC: 16.7 G/DL — SIGNIFICANT CHANGE UP (ref 13–17)
HYALINE CASTS # UR AUTO: 0 /LPF — SIGNIFICANT CHANGE UP (ref 0–7)
KETONES UR-MCNC: NEGATIVE — SIGNIFICANT CHANGE UP
LEUKOCYTE ESTERASE UR-ACNC: NEGATIVE — SIGNIFICANT CHANGE UP
MCHC RBC-ENTMCNC: 29.8 PG — SIGNIFICANT CHANGE UP (ref 27–34)
MCHC RBC-ENTMCNC: 32.8 GM/DL — SIGNIFICANT CHANGE UP (ref 32–36)
MCV RBC AUTO: 91 FL — SIGNIFICANT CHANGE UP (ref 80–100)
NITRITE UR-MCNC: NEGATIVE — SIGNIFICANT CHANGE UP
PH UR: 6.5 — SIGNIFICANT CHANGE UP (ref 5–8)
PLATELET # BLD AUTO: 171 K/UL — SIGNIFICANT CHANGE UP (ref 150–400)
PROT UR-MCNC: ABNORMAL MG/DL
RBC # BLD: 5.59 M/UL — SIGNIFICANT CHANGE UP (ref 4.2–5.8)
RBC # FLD: 13.3 % — SIGNIFICANT CHANGE UP (ref 10.3–14.5)
RBC CASTS # UR COMP ASSIST: >720 /HPF — HIGH (ref 0–4)
SP GR SPEC: 1.02 — SIGNIFICANT CHANGE UP (ref 1.01–1.02)
UROBILINOGEN FLD QL: 1 MG/DL — SIGNIFICANT CHANGE UP
WBC # BLD: 6 K/UL — SIGNIFICANT CHANGE UP (ref 3.8–10.5)
WBC # FLD AUTO: 6 K/UL — SIGNIFICANT CHANGE UP (ref 3.8–10.5)
WBC UR QL: 2 /HPF — SIGNIFICANT CHANGE UP (ref 0–5)

## 2017-05-01 PROCEDURE — 99233 SBSQ HOSP IP/OBS HIGH 50: CPT

## 2017-05-01 PROCEDURE — 99233 SBSQ HOSP IP/OBS HIGH 50: CPT | Mod: GC

## 2017-05-01 PROCEDURE — 99222 1ST HOSP IP/OBS MODERATE 55: CPT

## 2017-05-01 PROCEDURE — 99232 SBSQ HOSP IP/OBS MODERATE 35: CPT

## 2017-05-01 RX ORDER — INSULIN GLARGINE 100 [IU]/ML
55 INJECTION, SOLUTION SUBCUTANEOUS AT BEDTIME
Qty: 0 | Refills: 0 | Status: DISCONTINUED | OUTPATIENT
Start: 2017-05-01 | End: 2017-05-02

## 2017-05-01 RX ORDER — WARFARIN SODIUM 2.5 MG/1
5 TABLET ORAL ONCE
Qty: 0 | Refills: 0 | Status: COMPLETED | OUTPATIENT
Start: 2017-05-01 | End: 2017-05-01

## 2017-05-01 RX ORDER — INSULIN LISPRO 100/ML
15 VIAL (ML) SUBCUTANEOUS
Qty: 0 | Refills: 0 | Status: DISCONTINUED | OUTPATIENT
Start: 2017-05-01 | End: 2017-05-03

## 2017-05-01 RX ORDER — FUROSEMIDE 40 MG
20 TABLET ORAL ONCE
Qty: 0 | Refills: 0 | Status: COMPLETED | OUTPATIENT
Start: 2017-05-01 | End: 2017-05-01

## 2017-05-01 RX ORDER — INSULIN LISPRO 100/ML
15 VIAL (ML) SUBCUTANEOUS
Qty: 0 | Refills: 0 | Status: DISCONTINUED | OUTPATIENT
Start: 2017-05-02 | End: 2017-05-02

## 2017-05-01 RX ORDER — HYDRALAZINE HCL 50 MG
25 TABLET ORAL EVERY 8 HOURS
Qty: 0 | Refills: 0 | Status: DISCONTINUED | OUTPATIENT
Start: 2017-05-01 | End: 2017-05-09

## 2017-05-01 RX ADMIN — WARFARIN SODIUM 5 MILLIGRAM(S): 2.5 TABLET ORAL at 21:30

## 2017-05-01 RX ADMIN — Medication 3 MILLILITER(S): at 06:07

## 2017-05-01 RX ADMIN — AMLODIPINE BESYLATE 10 MILLIGRAM(S): 2.5 TABLET ORAL at 05:56

## 2017-05-01 RX ADMIN — Medication 20 MILLIGRAM(S): at 21:30

## 2017-05-01 RX ADMIN — Medication 4: at 08:52

## 2017-05-01 RX ADMIN — SODIUM CHLORIDE 3 MILLILITER(S): 9 INJECTION INTRAMUSCULAR; INTRAVENOUS; SUBCUTANEOUS at 21:41

## 2017-05-01 RX ADMIN — Medication 15 UNIT(S): at 13:16

## 2017-05-01 RX ADMIN — HEPARIN SODIUM 2400 UNIT(S)/HR: 5000 INJECTION INTRAVENOUS; SUBCUTANEOUS at 04:22

## 2017-05-01 RX ADMIN — SODIUM CHLORIDE 3 MILLILITER(S): 9 INJECTION INTRAMUSCULAR; INTRAVENOUS; SUBCUTANEOUS at 13:51

## 2017-05-01 RX ADMIN — Medication 25 MILLIGRAM(S): at 21:30

## 2017-05-01 RX ADMIN — Medication 1 TABLET(S): at 11:34

## 2017-05-01 RX ADMIN — Medication 15 UNIT(S): at 17:59

## 2017-05-01 RX ADMIN — TACROLIMUS 1.5 MILLIGRAM(S): 5 CAPSULE ORAL at 05:56

## 2017-05-01 RX ADMIN — Medication 3 MILLILITER(S): at 23:37

## 2017-05-01 RX ADMIN — Medication 0.1 MILLIGRAM(S): at 05:57

## 2017-05-01 RX ADMIN — PANTOPRAZOLE SODIUM 40 MILLIGRAM(S): 20 TABLET, DELAYED RELEASE ORAL at 11:33

## 2017-05-01 RX ADMIN — PIPERACILLIN AND TAZOBACTAM 25 GRAM(S): 4; .5 INJECTION, POWDER, LYOPHILIZED, FOR SOLUTION INTRAVENOUS at 05:57

## 2017-05-01 RX ADMIN — PIPERACILLIN AND TAZOBACTAM 25 GRAM(S): 4; .5 INJECTION, POWDER, LYOPHILIZED, FOR SOLUTION INTRAVENOUS at 13:26

## 2017-05-01 RX ADMIN — Medication 3 MILLILITER(S): at 17:17

## 2017-05-01 RX ADMIN — INSULIN GLARGINE 55 UNIT(S): 100 INJECTION, SOLUTION SUBCUTANEOUS at 21:31

## 2017-05-01 RX ADMIN — Medication 2: at 13:17

## 2017-05-01 RX ADMIN — SODIUM CHLORIDE 3 MILLILITER(S): 9 INJECTION INTRAMUSCULAR; INTRAVENOUS; SUBCUTANEOUS at 06:06

## 2017-05-01 RX ADMIN — Medication 325 MILLIGRAM(S): at 11:34

## 2017-05-01 RX ADMIN — Medication 3 MILLILITER(S): at 11:50

## 2017-05-01 RX ADMIN — Medication 75 MILLIGRAM(S): at 18:01

## 2017-05-01 RX ADMIN — Medication 13 UNIT(S): at 08:53

## 2017-05-01 RX ADMIN — Medication 650 MILLIGRAM(S): at 06:05

## 2017-05-01 RX ADMIN — ATORVASTATIN CALCIUM 20 MILLIGRAM(S): 80 TABLET, FILM COATED ORAL at 21:32

## 2017-05-01 RX ADMIN — Medication 75 MILLIGRAM(S): at 05:56

## 2017-05-01 RX ADMIN — Medication 0.1 MILLIGRAM(S): at 18:01

## 2017-05-01 RX ADMIN — TACROLIMUS 1.5 MILLIGRAM(S): 5 CAPSULE ORAL at 18:02

## 2017-05-01 RX ADMIN — PIPERACILLIN AND TAZOBACTAM 25 GRAM(S): 4; .5 INJECTION, POWDER, LYOPHILIZED, FOR SOLUTION INTRAVENOUS at 21:31

## 2017-05-01 RX ADMIN — Medication 75 MILLIGRAM(S): at 13:25

## 2017-05-02 LAB
ALBUMIN SERPL ELPH-MCNC: 3.1 G/DL — LOW (ref 3.3–5)
ALP SERPL-CCNC: 61 U/L — SIGNIFICANT CHANGE UP (ref 40–120)
ALT FLD-CCNC: 36 U/L RC — SIGNIFICANT CHANGE UP (ref 10–45)
ANION GAP SERPL CALC-SCNC: 9 MMOL/L — SIGNIFICANT CHANGE UP (ref 5–17)
APTT BLD: 72.6 SEC — HIGH (ref 27.5–37.4)
APTT BLD: 80 SEC — HIGH (ref 27.5–37.4)
AST SERPL-CCNC: 32 U/L — SIGNIFICANT CHANGE UP (ref 10–40)
BASE EXCESS BLDA CALC-SCNC: 12.1 MMOL/L — HIGH (ref -2–2)
BILIRUB SERPL-MCNC: 0.6 MG/DL — SIGNIFICANT CHANGE UP (ref 0.2–1.2)
BLD GP AB SCN SERPL QL: NEGATIVE — SIGNIFICANT CHANGE UP
BUN SERPL-MCNC: 15 MG/DL — SIGNIFICANT CHANGE UP (ref 7–23)
CALCIUM SERPL-MCNC: 9.3 MG/DL — SIGNIFICANT CHANGE UP (ref 8.4–10.5)
CHLORIDE SERPL-SCNC: 97 MMOL/L — SIGNIFICANT CHANGE UP (ref 96–108)
CO2 BLDA-SCNC: 42 MMOL/L — HIGH (ref 22–30)
CO2 SERPL-SCNC: 37 MMOL/L — HIGH (ref 22–31)
CREAT SERPL-MCNC: 1.05 MG/DL — SIGNIFICANT CHANGE UP (ref 0.5–1.3)
GAS PNL BLDA: SIGNIFICANT CHANGE UP
GLUCOSE SERPL-MCNC: 206 MG/DL — HIGH (ref 70–99)
HCO3 BLDA-SCNC: 40 MMOL/L — HIGH (ref 21–29)
HCT VFR BLD CALC: 50.6 % — HIGH (ref 39–50)
HGB BLD-MCNC: 16.2 G/DL — SIGNIFICANT CHANGE UP (ref 13–17)
HOROWITZ INDEX BLDA+IHG-RTO: 50 — SIGNIFICANT CHANGE UP
INR BLD: 1.06 RATIO — SIGNIFICANT CHANGE UP (ref 0.88–1.16)
INR BLD: 1.08 RATIO — SIGNIFICANT CHANGE UP (ref 0.88–1.16)
MAGNESIUM SERPL-MCNC: 1.8 MG/DL — SIGNIFICANT CHANGE UP (ref 1.6–2.6)
MCHC RBC-ENTMCNC: 29.1 PG — SIGNIFICANT CHANGE UP (ref 27–34)
MCHC RBC-ENTMCNC: 31.9 GM/DL — LOW (ref 32–36)
MCV RBC AUTO: 91.1 FL — SIGNIFICANT CHANGE UP (ref 80–100)
PCO2 BLDA: 66 MMHG — HIGH (ref 32–46)
PH BLDA: 7.4 — SIGNIFICANT CHANGE UP (ref 7.35–7.45)
PHOSPHATE SERPL-MCNC: 3.6 MG/DL — SIGNIFICANT CHANGE UP (ref 2.5–4.5)
PLATELET # BLD AUTO: 191 K/UL — SIGNIFICANT CHANGE UP (ref 150–400)
PO2 BLDA: 66 MMHG — LOW (ref 74–108)
POTASSIUM SERPL-MCNC: 3.6 MMOL/L — SIGNIFICANT CHANGE UP (ref 3.5–5.3)
POTASSIUM SERPL-SCNC: 3.6 MMOL/L — SIGNIFICANT CHANGE UP (ref 3.5–5.3)
PROT SERPL-MCNC: 6.7 G/DL — SIGNIFICANT CHANGE UP (ref 6–8.3)
PROTHROM AB SERPL-ACNC: 11.5 SEC — SIGNIFICANT CHANGE UP (ref 9.8–12.7)
PROTHROM AB SERPL-ACNC: 11.7 SEC — SIGNIFICANT CHANGE UP (ref 9.8–12.7)
RBC # BLD: 5.56 M/UL — SIGNIFICANT CHANGE UP (ref 4.2–5.8)
RBC # FLD: 13.4 % — SIGNIFICANT CHANGE UP (ref 10.3–14.5)
RH IG SCN BLD-IMP: NEGATIVE — SIGNIFICANT CHANGE UP
SAO2 % BLDA: 91 % — LOW (ref 92–96)
SODIUM SERPL-SCNC: 143 MMOL/L — SIGNIFICANT CHANGE UP (ref 135–145)
TACROLIMUS SERPL-MCNC: 3.7 NG/ML — SIGNIFICANT CHANGE UP
WBC # BLD: 7.1 K/UL — SIGNIFICANT CHANGE UP (ref 3.8–10.5)
WBC # FLD AUTO: 7.1 K/UL — SIGNIFICANT CHANGE UP (ref 3.8–10.5)

## 2017-05-02 PROCEDURE — 99232 SBSQ HOSP IP/OBS MODERATE 35: CPT

## 2017-05-02 PROCEDURE — 88112 CYTOPATH CELL ENHANCE TECH: CPT | Mod: 26

## 2017-05-02 PROCEDURE — 99233 SBSQ HOSP IP/OBS HIGH 50: CPT | Mod: GC

## 2017-05-02 PROCEDURE — 76776 US EXAM K TRANSPL W/DOPPLER: CPT | Mod: 26,RT

## 2017-05-02 PROCEDURE — 93306 TTE W/DOPPLER COMPLETE: CPT | Mod: 26

## 2017-05-02 RX ORDER — INSULIN GLARGINE 100 [IU]/ML
52 INJECTION, SOLUTION SUBCUTANEOUS AT BEDTIME
Qty: 0 | Refills: 0 | Status: DISCONTINUED | OUTPATIENT
Start: 2017-05-02 | End: 2017-05-04

## 2017-05-02 RX ORDER — WARFARIN SODIUM 2.5 MG/1
5 TABLET ORAL ONCE
Qty: 0 | Refills: 0 | Status: COMPLETED | OUTPATIENT
Start: 2017-05-02 | End: 2017-05-02

## 2017-05-02 RX ORDER — ASPIRIN/CALCIUM CARB/MAGNESIUM 324 MG
81 TABLET ORAL DAILY
Qty: 0 | Refills: 0 | Status: DISCONTINUED | OUTPATIENT
Start: 2017-05-02 | End: 2017-05-02

## 2017-05-02 RX ORDER — ASPIRIN/CALCIUM CARB/MAGNESIUM 324 MG
81 TABLET ORAL DAILY
Qty: 0 | Refills: 0 | Status: DISCONTINUED | OUTPATIENT
Start: 2017-05-02 | End: 2017-05-09

## 2017-05-02 RX ORDER — INSULIN LISPRO 100/ML
15 VIAL (ML) SUBCUTANEOUS
Qty: 0 | Refills: 0 | Status: DISCONTINUED | OUTPATIENT
Start: 2017-05-02 | End: 2017-05-04

## 2017-05-02 RX ORDER — AZATHIOPRINE 100 MG/1
50 TABLET ORAL DAILY
Qty: 0 | Refills: 0 | Status: DISCONTINUED | OUTPATIENT
Start: 2017-05-02 | End: 2017-05-09

## 2017-05-02 RX ORDER — INSULIN LISPRO 100/ML
14 VIAL (ML) SUBCUTANEOUS
Qty: 0 | Refills: 0 | Status: DISCONTINUED | OUTPATIENT
Start: 2017-05-02 | End: 2017-05-05

## 2017-05-02 RX ADMIN — Medication 25 MILLIGRAM(S): at 06:10

## 2017-05-02 RX ADMIN — Medication 81 MILLIGRAM(S): at 12:07

## 2017-05-02 RX ADMIN — Medication 0.1 MILLIGRAM(S): at 18:23

## 2017-05-02 RX ADMIN — PIPERACILLIN AND TAZOBACTAM 25 GRAM(S): 4; .5 INJECTION, POWDER, LYOPHILIZED, FOR SOLUTION INTRAVENOUS at 06:10

## 2017-05-02 RX ADMIN — TACROLIMUS 1.5 MILLIGRAM(S): 5 CAPSULE ORAL at 18:25

## 2017-05-02 RX ADMIN — Medication 0.1 MILLIGRAM(S): at 06:10

## 2017-05-02 RX ADMIN — Medication 3 MILLILITER(S): at 11:48

## 2017-05-02 RX ADMIN — Medication 25 MILLIGRAM(S): at 13:06

## 2017-05-02 RX ADMIN — PANTOPRAZOLE SODIUM 40 MILLIGRAM(S): 20 TABLET, DELAYED RELEASE ORAL at 12:02

## 2017-05-02 RX ADMIN — Medication 15 UNIT(S): at 13:04

## 2017-05-02 RX ADMIN — WARFARIN SODIUM 5 MILLIGRAM(S): 2.5 TABLET ORAL at 23:04

## 2017-05-02 RX ADMIN — ATORVASTATIN CALCIUM 20 MILLIGRAM(S): 80 TABLET, FILM COATED ORAL at 23:04

## 2017-05-02 RX ADMIN — AZATHIOPRINE 50 MILLIGRAM(S): 100 TABLET ORAL at 18:22

## 2017-05-02 RX ADMIN — TACROLIMUS 1.5 MILLIGRAM(S): 5 CAPSULE ORAL at 06:13

## 2017-05-02 RX ADMIN — Medication 1 TABLET(S): at 12:01

## 2017-05-02 RX ADMIN — AMLODIPINE BESYLATE 10 MILLIGRAM(S): 2.5 TABLET ORAL at 06:10

## 2017-05-02 RX ADMIN — Medication 75 MILLIGRAM(S): at 06:10

## 2017-05-02 RX ADMIN — Medication 2: at 08:34

## 2017-05-02 RX ADMIN — SODIUM CHLORIDE 3 MILLILITER(S): 9 INJECTION INTRAMUSCULAR; INTRAVENOUS; SUBCUTANEOUS at 15:00

## 2017-05-02 RX ADMIN — SODIUM CHLORIDE 3 MILLILITER(S): 9 INJECTION INTRAMUSCULAR; INTRAVENOUS; SUBCUTANEOUS at 06:17

## 2017-05-02 RX ADMIN — PIPERACILLIN AND TAZOBACTAM 25 GRAM(S): 4; .5 INJECTION, POWDER, LYOPHILIZED, FOR SOLUTION INTRAVENOUS at 23:06

## 2017-05-02 RX ADMIN — PIPERACILLIN AND TAZOBACTAM 25 GRAM(S): 4; .5 INJECTION, POWDER, LYOPHILIZED, FOR SOLUTION INTRAVENOUS at 14:35

## 2017-05-02 RX ADMIN — SODIUM CHLORIDE 3 MILLILITER(S): 9 INJECTION INTRAMUSCULAR; INTRAVENOUS; SUBCUTANEOUS at 23:06

## 2017-05-02 RX ADMIN — Medication 15 UNIT(S): at 18:21

## 2017-05-02 RX ADMIN — Medication 3 MILLILITER(S): at 17:47

## 2017-05-02 RX ADMIN — Medication 75 MILLIGRAM(S): at 18:24

## 2017-05-02 RX ADMIN — Medication 15 UNIT(S): at 10:21

## 2017-05-02 RX ADMIN — Medication 3 MILLILITER(S): at 23:14

## 2017-05-02 RX ADMIN — INSULIN GLARGINE 52 UNIT(S): 100 INJECTION, SOLUTION SUBCUTANEOUS at 23:05

## 2017-05-02 RX ADMIN — HEPARIN SODIUM 2400 UNIT(S)/HR: 5000 INJECTION INTRAVENOUS; SUBCUTANEOUS at 00:48

## 2017-05-02 RX ADMIN — Medication 25 MILLIGRAM(S): at 23:05

## 2017-05-02 RX ADMIN — HEPARIN SODIUM 2400 UNIT(S)/HR: 5000 INJECTION INTRAVENOUS; SUBCUTANEOUS at 08:21

## 2017-05-02 RX ADMIN — Medication 3 MILLILITER(S): at 05:20

## 2017-05-03 LAB
ALBUMIN SERPL ELPH-MCNC: 3.3 G/DL — SIGNIFICANT CHANGE UP (ref 3.3–5)
ALP SERPL-CCNC: 59 U/L — SIGNIFICANT CHANGE UP (ref 40–120)
ALT FLD-CCNC: 33 U/L RC — SIGNIFICANT CHANGE UP (ref 10–45)
ANION GAP SERPL CALC-SCNC: 5 MMOL/L — SIGNIFICANT CHANGE UP (ref 5–17)
APTT BLD: 71.3 SEC — HIGH (ref 27.5–37.4)
APTT BLD: 85.7 SEC — HIGH (ref 27.5–37.4)
AST SERPL-CCNC: 32 U/L — SIGNIFICANT CHANGE UP (ref 10–40)
BILIRUB SERPL-MCNC: 0.6 MG/DL — SIGNIFICANT CHANGE UP (ref 0.2–1.2)
BLD GP AB SCN SERPL QL: NEGATIVE — SIGNIFICANT CHANGE UP
BUN SERPL-MCNC: 15 MG/DL — SIGNIFICANT CHANGE UP (ref 7–23)
CALCIUM SERPL-MCNC: 9.5 MG/DL — SIGNIFICANT CHANGE UP (ref 8.4–10.5)
CHLORIDE SERPL-SCNC: 98 MMOL/L — SIGNIFICANT CHANGE UP (ref 96–108)
CO2 SERPL-SCNC: 39 MMOL/L — HIGH (ref 22–31)
CREAT SERPL-MCNC: 1 MG/DL — SIGNIFICANT CHANGE UP (ref 0.5–1.3)
CULTURE RESULTS: NO GROWTH — SIGNIFICANT CHANGE UP
GLUCOSE SERPL-MCNC: 172 MG/DL — HIGH (ref 70–99)
HCT VFR BLD CALC: 47.5 % — SIGNIFICANT CHANGE UP (ref 39–50)
HCT VFR BLD CALC: 51.1 % — HIGH (ref 39–50)
HGB BLD-MCNC: 15.4 G/DL — SIGNIFICANT CHANGE UP (ref 13–17)
HGB BLD-MCNC: 15.7 G/DL — SIGNIFICANT CHANGE UP (ref 13–17)
INR BLD: 1.08 RATIO — SIGNIFICANT CHANGE UP (ref 0.88–1.16)
INR BLD: 1.14 RATIO — SIGNIFICANT CHANGE UP (ref 0.88–1.16)
MAGNESIUM SERPL-MCNC: 2 MG/DL — SIGNIFICANT CHANGE UP (ref 1.6–2.6)
MCHC RBC-ENTMCNC: 29.2 PG — SIGNIFICANT CHANGE UP (ref 27–34)
MCHC RBC-ENTMCNC: 29.7 PG — SIGNIFICANT CHANGE UP (ref 27–34)
MCHC RBC-ENTMCNC: 30.7 GM/DL — LOW (ref 32–36)
MCHC RBC-ENTMCNC: 32.4 GM/DL — SIGNIFICANT CHANGE UP (ref 32–36)
MCV RBC AUTO: 91.9 FL — SIGNIFICANT CHANGE UP (ref 80–100)
MCV RBC AUTO: 95 FL — SIGNIFICANT CHANGE UP (ref 80–100)
NON-GYNECOLOGICAL CYTOLOGY STUDY: SIGNIFICANT CHANGE UP
PHOSPHATE SERPL-MCNC: 4.3 MG/DL — SIGNIFICANT CHANGE UP (ref 2.5–4.5)
PLATELET # BLD AUTO: 174 K/UL — SIGNIFICANT CHANGE UP (ref 150–400)
PLATELET # BLD AUTO: 179 K/UL — SIGNIFICANT CHANGE UP (ref 150–400)
POTASSIUM SERPL-MCNC: 3.9 MMOL/L — SIGNIFICANT CHANGE UP (ref 3.5–5.3)
POTASSIUM SERPL-SCNC: 3.9 MMOL/L — SIGNIFICANT CHANGE UP (ref 3.5–5.3)
PROT SERPL-MCNC: 6.9 G/DL — SIGNIFICANT CHANGE UP (ref 6–8.3)
PROTHROM AB SERPL-ACNC: 11.8 SEC — SIGNIFICANT CHANGE UP (ref 9.8–12.7)
PROTHROM AB SERPL-ACNC: 12.4 SEC — SIGNIFICANT CHANGE UP (ref 9.8–12.7)
RBC # BLD: 5.17 M/UL — SIGNIFICANT CHANGE UP (ref 4.2–5.8)
RBC # BLD: 5.38 M/UL — SIGNIFICANT CHANGE UP (ref 4.2–5.8)
RBC # FLD: 13.2 % — SIGNIFICANT CHANGE UP (ref 10.3–14.5)
RBC # FLD: 14 % — SIGNIFICANT CHANGE UP (ref 10.3–14.5)
RH IG SCN BLD-IMP: NEGATIVE — SIGNIFICANT CHANGE UP
SODIUM SERPL-SCNC: 142 MMOL/L — SIGNIFICANT CHANGE UP (ref 135–145)
SPECIMEN SOURCE: SIGNIFICANT CHANGE UP
TACROLIMUS SERPL-MCNC: 4.2 NG/ML — SIGNIFICANT CHANGE UP
WBC # BLD: 4.25 K/UL — SIGNIFICANT CHANGE UP (ref 3.8–10.5)
WBC # BLD: 5.4 K/UL — SIGNIFICANT CHANGE UP (ref 3.8–10.5)
WBC # FLD AUTO: 4.25 K/UL — SIGNIFICANT CHANGE UP (ref 3.8–10.5)
WBC # FLD AUTO: 5.4 K/UL — SIGNIFICANT CHANGE UP (ref 3.8–10.5)

## 2017-05-03 PROCEDURE — 99233 SBSQ HOSP IP/OBS HIGH 50: CPT | Mod: GC

## 2017-05-03 PROCEDURE — 99232 SBSQ HOSP IP/OBS MODERATE 35: CPT

## 2017-05-03 PROCEDURE — 51700 IRRIGATION OF BLADDER: CPT

## 2017-05-03 RX ORDER — WARFARIN SODIUM 2.5 MG/1
5 TABLET ORAL ONCE
Qty: 0 | Refills: 0 | Status: COMPLETED | OUTPATIENT
Start: 2017-05-03 | End: 2017-05-03

## 2017-05-03 RX ADMIN — PANTOPRAZOLE SODIUM 40 MILLIGRAM(S): 20 TABLET, DELAYED RELEASE ORAL at 13:03

## 2017-05-03 RX ADMIN — Medication 3 MILLILITER(S): at 05:12

## 2017-05-03 RX ADMIN — Medication 0.1 MILLIGRAM(S): at 06:46

## 2017-05-03 RX ADMIN — Medication 2: at 22:09

## 2017-05-03 RX ADMIN — Medication 3 MILLILITER(S): at 11:30

## 2017-05-03 RX ADMIN — SENNA PLUS 5 MILLILITER(S): 8.6 TABLET ORAL at 13:03

## 2017-05-03 RX ADMIN — Medication 25 MILLIGRAM(S): at 22:08

## 2017-05-03 RX ADMIN — Medication 15 UNIT(S): at 18:12

## 2017-05-03 RX ADMIN — INSULIN GLARGINE 52 UNIT(S): 100 INJECTION, SOLUTION SUBCUTANEOUS at 22:08

## 2017-05-03 RX ADMIN — WARFARIN SODIUM 5 MILLIGRAM(S): 2.5 TABLET ORAL at 22:08

## 2017-05-03 RX ADMIN — Medication 14 UNIT(S): at 08:12

## 2017-05-03 RX ADMIN — PIPERACILLIN AND TAZOBACTAM 25 GRAM(S): 4; .5 INJECTION, POWDER, LYOPHILIZED, FOR SOLUTION INTRAVENOUS at 06:47

## 2017-05-03 RX ADMIN — Medication 75 MILLIGRAM(S): at 06:46

## 2017-05-03 RX ADMIN — SODIUM CHLORIDE 3 MILLILITER(S): 9 INJECTION INTRAMUSCULAR; INTRAVENOUS; SUBCUTANEOUS at 14:50

## 2017-05-03 RX ADMIN — HEPARIN SODIUM 2400 UNIT(S)/HR: 5000 INJECTION INTRAVENOUS; SUBCUTANEOUS at 08:10

## 2017-05-03 RX ADMIN — SODIUM CHLORIDE 3 MILLILITER(S): 9 INJECTION INTRAMUSCULAR; INTRAVENOUS; SUBCUTANEOUS at 06:44

## 2017-05-03 RX ADMIN — Medication 3 MILLILITER(S): at 23:16

## 2017-05-03 RX ADMIN — Medication 0.1 MILLIGRAM(S): at 18:13

## 2017-05-03 RX ADMIN — TACROLIMUS 1.5 MILLIGRAM(S): 5 CAPSULE ORAL at 18:13

## 2017-05-03 RX ADMIN — AMLODIPINE BESYLATE 10 MILLIGRAM(S): 2.5 TABLET ORAL at 06:46

## 2017-05-03 RX ADMIN — TACROLIMUS 1.5 MILLIGRAM(S): 5 CAPSULE ORAL at 06:45

## 2017-05-03 RX ADMIN — Medication 2: at 13:02

## 2017-05-03 RX ADMIN — Medication 3 MILLILITER(S): at 17:21

## 2017-05-03 RX ADMIN — Medication 25 MILLIGRAM(S): at 06:46

## 2017-05-03 RX ADMIN — Medication 2: at 18:11

## 2017-05-03 RX ADMIN — Medication 25 MILLIGRAM(S): at 13:03

## 2017-05-03 RX ADMIN — Medication 75 MILLIGRAM(S): at 18:13

## 2017-05-03 RX ADMIN — Medication 14 UNIT(S): at 13:43

## 2017-05-03 RX ADMIN — Medication 1 TABLET(S): at 13:04

## 2017-05-03 RX ADMIN — Medication 81 MILLIGRAM(S): at 13:03

## 2017-05-03 RX ADMIN — SODIUM CHLORIDE 3 MILLILITER(S): 9 INJECTION INTRAMUSCULAR; INTRAVENOUS; SUBCUTANEOUS at 22:10

## 2017-05-03 RX ADMIN — ATORVASTATIN CALCIUM 20 MILLIGRAM(S): 80 TABLET, FILM COATED ORAL at 22:08

## 2017-05-03 RX ADMIN — AZATHIOPRINE 50 MILLIGRAM(S): 100 TABLET ORAL at 13:03

## 2017-05-04 LAB
ALBUMIN SERPL ELPH-MCNC: 3.2 G/DL — LOW (ref 3.3–5)
ALP SERPL-CCNC: 58 U/L — SIGNIFICANT CHANGE UP (ref 40–120)
ALT FLD-CCNC: 27 U/L RC — SIGNIFICANT CHANGE UP (ref 10–45)
ANION GAP SERPL CALC-SCNC: 10 MMOL/L — SIGNIFICANT CHANGE UP (ref 5–17)
APTT BLD: 77.5 SEC — HIGH (ref 27.5–37.4)
AST SERPL-CCNC: 20 U/L — SIGNIFICANT CHANGE UP (ref 10–40)
BILIRUB SERPL-MCNC: 0.5 MG/DL — SIGNIFICANT CHANGE UP (ref 0.2–1.2)
BUN SERPL-MCNC: 14 MG/DL — SIGNIFICANT CHANGE UP (ref 7–23)
CALCIUM SERPL-MCNC: 9.2 MG/DL — SIGNIFICANT CHANGE UP (ref 8.4–10.5)
CHLORIDE SERPL-SCNC: 96 MMOL/L — SIGNIFICANT CHANGE UP (ref 96–108)
CO2 SERPL-SCNC: 36 MMOL/L — HIGH (ref 22–31)
CREAT SERPL-MCNC: 0.97 MG/DL — SIGNIFICANT CHANGE UP (ref 0.5–1.3)
CULTURE RESULTS: SIGNIFICANT CHANGE UP
CULTURE RESULTS: SIGNIFICANT CHANGE UP
GLUCOSE SERPL-MCNC: 218 MG/DL — HIGH (ref 70–99)
HCT VFR BLD CALC: 46.9 % — SIGNIFICANT CHANGE UP (ref 39–50)
HGB BLD-MCNC: 14.5 G/DL — SIGNIFICANT CHANGE UP (ref 13–17)
INR BLD: 1.17 RATIO — HIGH (ref 0.88–1.16)
MAGNESIUM SERPL-MCNC: 2 MG/DL — SIGNIFICANT CHANGE UP (ref 1.6–2.6)
MCHC RBC-ENTMCNC: 28.7 PG — SIGNIFICANT CHANGE UP (ref 27–34)
MCHC RBC-ENTMCNC: 30.9 GM/DL — LOW (ref 32–36)
MCV RBC AUTO: 92.9 FL — SIGNIFICANT CHANGE UP (ref 80–100)
PHOSPHATE SERPL-MCNC: 3.7 MG/DL — SIGNIFICANT CHANGE UP (ref 2.5–4.5)
PLATELET # BLD AUTO: 197 K/UL — SIGNIFICANT CHANGE UP (ref 150–400)
POTASSIUM SERPL-MCNC: 4.3 MMOL/L — SIGNIFICANT CHANGE UP (ref 3.5–5.3)
POTASSIUM SERPL-SCNC: 4.3 MMOL/L — SIGNIFICANT CHANGE UP (ref 3.5–5.3)
PROT SERPL-MCNC: 6.6 G/DL — SIGNIFICANT CHANGE UP (ref 6–8.3)
PROTHROM AB SERPL-ACNC: 12.7 SEC — SIGNIFICANT CHANGE UP (ref 9.8–12.7)
RBC # BLD: 5.05 M/UL — SIGNIFICANT CHANGE UP (ref 4.2–5.8)
RBC # FLD: 13.9 % — SIGNIFICANT CHANGE UP (ref 10.3–14.5)
SODIUM SERPL-SCNC: 142 MMOL/L — SIGNIFICANT CHANGE UP (ref 135–145)
SPECIMEN SOURCE: SIGNIFICANT CHANGE UP
SPECIMEN SOURCE: SIGNIFICANT CHANGE UP
TACROLIMUS SERPL-MCNC: 3.1 NG/ML — SIGNIFICANT CHANGE UP
WBC # BLD: 5.54 K/UL — SIGNIFICANT CHANGE UP (ref 3.8–10.5)
WBC # FLD AUTO: 5.54 K/UL — SIGNIFICANT CHANGE UP (ref 3.8–10.5)

## 2017-05-04 PROCEDURE — 99233 SBSQ HOSP IP/OBS HIGH 50: CPT | Mod: GC

## 2017-05-04 PROCEDURE — 99232 SBSQ HOSP IP/OBS MODERATE 35: CPT

## 2017-05-04 RX ORDER — TACROLIMUS 5 MG/1
2 CAPSULE ORAL
Qty: 0 | Refills: 0 | Status: DISCONTINUED | OUTPATIENT
Start: 2017-05-04 | End: 2017-05-09

## 2017-05-04 RX ORDER — INSULIN GLARGINE 100 [IU]/ML
55 INJECTION, SOLUTION SUBCUTANEOUS AT BEDTIME
Qty: 0 | Refills: 0 | Status: DISCONTINUED | OUTPATIENT
Start: 2017-05-04 | End: 2017-05-05

## 2017-05-04 RX ORDER — WARFARIN SODIUM 2.5 MG/1
5 TABLET ORAL ONCE
Qty: 0 | Refills: 0 | Status: COMPLETED | OUTPATIENT
Start: 2017-05-04 | End: 2017-05-04

## 2017-05-04 RX ORDER — INSULIN LISPRO 100/ML
17 VIAL (ML) SUBCUTANEOUS
Qty: 0 | Refills: 0 | Status: DISCONTINUED | OUTPATIENT
Start: 2017-05-04 | End: 2017-05-05

## 2017-05-04 RX ADMIN — Medication 25 MILLIGRAM(S): at 06:38

## 2017-05-04 RX ADMIN — ATORVASTATIN CALCIUM 20 MILLIGRAM(S): 80 TABLET, FILM COATED ORAL at 21:48

## 2017-05-04 RX ADMIN — Medication 75 MILLIGRAM(S): at 17:51

## 2017-05-04 RX ADMIN — Medication 1 TABLET(S): at 12:46

## 2017-05-04 RX ADMIN — Medication 25 MILLIGRAM(S): at 13:03

## 2017-05-04 RX ADMIN — Medication 75 MILLIGRAM(S): at 06:38

## 2017-05-04 RX ADMIN — Medication 25 MILLIGRAM(S): at 21:48

## 2017-05-04 RX ADMIN — Medication 3 MILLILITER(S): at 05:20

## 2017-05-04 RX ADMIN — Medication 4: at 17:50

## 2017-05-04 RX ADMIN — HEPARIN SODIUM 2400 UNIT(S)/HR: 5000 INJECTION INTRAVENOUS; SUBCUTANEOUS at 08:59

## 2017-05-04 RX ADMIN — INSULIN GLARGINE 55 UNIT(S): 100 INJECTION, SOLUTION SUBCUTANEOUS at 21:48

## 2017-05-04 RX ADMIN — Medication 4: at 09:00

## 2017-05-04 RX ADMIN — Medication 14 UNIT(S): at 13:02

## 2017-05-04 RX ADMIN — Medication 17 UNIT(S): at 17:50

## 2017-05-04 RX ADMIN — Medication 3 MILLILITER(S): at 17:20

## 2017-05-04 RX ADMIN — PANTOPRAZOLE SODIUM 40 MILLIGRAM(S): 20 TABLET, DELAYED RELEASE ORAL at 12:46

## 2017-05-04 RX ADMIN — TACROLIMUS 1.5 MILLIGRAM(S): 5 CAPSULE ORAL at 06:38

## 2017-05-04 RX ADMIN — Medication 2: at 21:48

## 2017-05-04 RX ADMIN — SODIUM CHLORIDE 3 MILLILITER(S): 9 INJECTION INTRAMUSCULAR; INTRAVENOUS; SUBCUTANEOUS at 06:31

## 2017-05-04 RX ADMIN — AMLODIPINE BESYLATE 10 MILLIGRAM(S): 2.5 TABLET ORAL at 06:38

## 2017-05-04 RX ADMIN — WARFARIN SODIUM 5 MILLIGRAM(S): 2.5 TABLET ORAL at 21:48

## 2017-05-04 RX ADMIN — Medication 0.1 MILLIGRAM(S): at 06:38

## 2017-05-04 RX ADMIN — Medication 14 UNIT(S): at 09:01

## 2017-05-04 RX ADMIN — Medication 3 MILLILITER(S): at 23:30

## 2017-05-04 RX ADMIN — Medication 4: at 13:01

## 2017-05-04 RX ADMIN — TACROLIMUS 2 MILLIGRAM(S): 5 CAPSULE ORAL at 17:48

## 2017-05-04 RX ADMIN — SODIUM CHLORIDE 3 MILLILITER(S): 9 INJECTION INTRAMUSCULAR; INTRAVENOUS; SUBCUTANEOUS at 13:03

## 2017-05-04 RX ADMIN — SODIUM CHLORIDE 3 MILLILITER(S): 9 INJECTION INTRAMUSCULAR; INTRAVENOUS; SUBCUTANEOUS at 21:49

## 2017-05-04 RX ADMIN — Medication 81 MILLIGRAM(S): at 12:47

## 2017-05-04 RX ADMIN — Medication 3 MILLILITER(S): at 11:09

## 2017-05-04 RX ADMIN — AZATHIOPRINE 50 MILLIGRAM(S): 100 TABLET ORAL at 12:47

## 2017-05-05 LAB
ALBUMIN SERPL ELPH-MCNC: 3.2 G/DL — LOW (ref 3.3–5)
ALP SERPL-CCNC: 62 U/L — SIGNIFICANT CHANGE UP (ref 40–120)
ALT FLD-CCNC: 26 U/L RC — SIGNIFICANT CHANGE UP (ref 10–45)
ANION GAP SERPL CALC-SCNC: 13 MMOL/L — SIGNIFICANT CHANGE UP (ref 5–17)
APTT BLD: 67.9 SEC — HIGH (ref 27.5–37.4)
AST SERPL-CCNC: 23 U/L — SIGNIFICANT CHANGE UP (ref 10–40)
BILIRUB SERPL-MCNC: 0.6 MG/DL — SIGNIFICANT CHANGE UP (ref 0.2–1.2)
BUN SERPL-MCNC: 14 MG/DL — SIGNIFICANT CHANGE UP (ref 7–23)
CALCIUM SERPL-MCNC: 9.4 MG/DL — SIGNIFICANT CHANGE UP (ref 8.4–10.5)
CHLORIDE SERPL-SCNC: 94 MMOL/L — LOW (ref 96–108)
CO2 SERPL-SCNC: 30 MMOL/L — SIGNIFICANT CHANGE UP (ref 22–31)
CREAT SERPL-MCNC: 1.01 MG/DL — SIGNIFICANT CHANGE UP (ref 0.5–1.3)
GLUCOSE SERPL-MCNC: 278 MG/DL — HIGH (ref 70–99)
INR BLD: 1.14 RATIO — SIGNIFICANT CHANGE UP (ref 0.88–1.16)
MAGNESIUM SERPL-MCNC: 1.8 MG/DL — SIGNIFICANT CHANGE UP (ref 1.6–2.6)
PHOSPHATE SERPL-MCNC: 2.4 MG/DL — LOW (ref 2.5–4.5)
POTASSIUM SERPL-MCNC: 5.1 MMOL/L — SIGNIFICANT CHANGE UP (ref 3.5–5.3)
POTASSIUM SERPL-SCNC: 5.1 MMOL/L — SIGNIFICANT CHANGE UP (ref 3.5–5.3)
PROT SERPL-MCNC: 7 G/DL — SIGNIFICANT CHANGE UP (ref 6–8.3)
PROTHROM AB SERPL-ACNC: 12.4 SEC — SIGNIFICANT CHANGE UP (ref 9.8–12.7)
SODIUM SERPL-SCNC: 137 MMOL/L — SIGNIFICANT CHANGE UP (ref 135–145)
TACROLIMUS SERPL-MCNC: 10.1 NG/ML — SIGNIFICANT CHANGE UP

## 2017-05-05 PROCEDURE — 99233 SBSQ HOSP IP/OBS HIGH 50: CPT | Mod: GC

## 2017-05-05 PROCEDURE — 99232 SBSQ HOSP IP/OBS MODERATE 35: CPT

## 2017-05-05 RX ORDER — WARFARIN SODIUM 2.5 MG/1
5 TABLET ORAL ONCE
Qty: 0 | Refills: 0 | Status: DISCONTINUED | OUTPATIENT
Start: 2017-05-05 | End: 2017-05-05

## 2017-05-05 RX ORDER — INSULIN LISPRO 100/ML
19 VIAL (ML) SUBCUTANEOUS
Qty: 0 | Refills: 0 | Status: DISCONTINUED | OUTPATIENT
Start: 2017-05-05 | End: 2017-05-06

## 2017-05-05 RX ORDER — INSULIN LISPRO 100/ML
16 VIAL (ML) SUBCUTANEOUS
Qty: 0 | Refills: 0 | Status: DISCONTINUED | OUTPATIENT
Start: 2017-05-05 | End: 2017-05-06

## 2017-05-05 RX ORDER — WARFARIN SODIUM 2.5 MG/1
7.5 TABLET ORAL ONCE
Qty: 0 | Refills: 0 | Status: COMPLETED | OUTPATIENT
Start: 2017-05-05 | End: 2017-05-05

## 2017-05-05 RX ORDER — INSULIN GLARGINE 100 [IU]/ML
50 INJECTION, SOLUTION SUBCUTANEOUS AT BEDTIME
Qty: 0 | Refills: 0 | Status: DISCONTINUED | OUTPATIENT
Start: 2017-05-05 | End: 2017-05-06

## 2017-05-05 RX ORDER — INSULIN GLARGINE 100 [IU]/ML
10 INJECTION, SOLUTION SUBCUTANEOUS EVERY MORNING
Qty: 0 | Refills: 0 | Status: DISCONTINUED | OUTPATIENT
Start: 2017-05-06 | End: 2017-05-06

## 2017-05-05 RX ORDER — INSULIN LISPRO 100/ML
16 VIAL (ML) SUBCUTANEOUS
Qty: 0 | Refills: 0 | Status: DISCONTINUED | OUTPATIENT
Start: 2017-05-05 | End: 2017-05-07

## 2017-05-05 RX ORDER — INSULIN GLARGINE 100 [IU]/ML
60 INJECTION, SOLUTION SUBCUTANEOUS AT BEDTIME
Qty: 0 | Refills: 0 | Status: DISCONTINUED | OUTPATIENT
Start: 2017-05-05 | End: 2017-05-05

## 2017-05-05 RX ADMIN — Medication 14 UNIT(S): at 13:16

## 2017-05-05 RX ADMIN — PANTOPRAZOLE SODIUM 40 MILLIGRAM(S): 20 TABLET, DELAYED RELEASE ORAL at 13:14

## 2017-05-05 RX ADMIN — HEPARIN SODIUM 2400 UNIT(S)/HR: 5000 INJECTION INTRAVENOUS; SUBCUTANEOUS at 09:08

## 2017-05-05 RX ADMIN — Medication 75 MILLIGRAM(S): at 06:45

## 2017-05-05 RX ADMIN — Medication 650 MILLIGRAM(S): at 07:30

## 2017-05-05 RX ADMIN — Medication 4: at 09:07

## 2017-05-05 RX ADMIN — SODIUM CHLORIDE 3 MILLILITER(S): 9 INJECTION INTRAMUSCULAR; INTRAVENOUS; SUBCUTANEOUS at 21:29

## 2017-05-05 RX ADMIN — Medication 19 UNIT(S): at 18:27

## 2017-05-05 RX ADMIN — Medication 25 MILLIGRAM(S): at 21:27

## 2017-05-05 RX ADMIN — Medication 3 MILLILITER(S): at 05:25

## 2017-05-05 RX ADMIN — Medication 75 MILLIGRAM(S): at 18:27

## 2017-05-05 RX ADMIN — WARFARIN SODIUM 7.5 MILLIGRAM(S): 2.5 TABLET ORAL at 21:27

## 2017-05-05 RX ADMIN — Medication 0.1 MILLIGRAM(S): at 06:45

## 2017-05-05 RX ADMIN — AZATHIOPRINE 50 MILLIGRAM(S): 100 TABLET ORAL at 13:15

## 2017-05-05 RX ADMIN — Medication 650 MILLIGRAM(S): at 06:56

## 2017-05-05 RX ADMIN — SODIUM CHLORIDE 3 MILLILITER(S): 9 INJECTION INTRAMUSCULAR; INTRAVENOUS; SUBCUTANEOUS at 06:59

## 2017-05-05 RX ADMIN — Medication 3 MILLILITER(S): at 17:39

## 2017-05-05 RX ADMIN — ATORVASTATIN CALCIUM 20 MILLIGRAM(S): 80 TABLET, FILM COATED ORAL at 21:27

## 2017-05-05 RX ADMIN — Medication 81 MILLIGRAM(S): at 13:14

## 2017-05-05 RX ADMIN — Medication 25 MILLIGRAM(S): at 13:15

## 2017-05-05 RX ADMIN — Medication 0.1 MILLIGRAM(S): at 18:28

## 2017-05-05 RX ADMIN — Medication 14 UNIT(S): at 09:07

## 2017-05-05 RX ADMIN — Medication 2: at 18:26

## 2017-05-05 RX ADMIN — SODIUM CHLORIDE 3 MILLILITER(S): 9 INJECTION INTRAMUSCULAR; INTRAVENOUS; SUBCUTANEOUS at 13:19

## 2017-05-05 RX ADMIN — AMLODIPINE BESYLATE 10 MILLIGRAM(S): 2.5 TABLET ORAL at 06:45

## 2017-05-05 RX ADMIN — TACROLIMUS 2 MILLIGRAM(S): 5 CAPSULE ORAL at 06:45

## 2017-05-05 RX ADMIN — TACROLIMUS 2 MILLIGRAM(S): 5 CAPSULE ORAL at 18:28

## 2017-05-05 RX ADMIN — Medication 25 MILLIGRAM(S): at 06:45

## 2017-05-05 RX ADMIN — Medication 3 MILLILITER(S): at 12:35

## 2017-05-05 RX ADMIN — INSULIN GLARGINE 50 UNIT(S): 100 INJECTION, SOLUTION SUBCUTANEOUS at 21:53

## 2017-05-05 RX ADMIN — Medication 3 MILLILITER(S): at 23:44

## 2017-05-05 RX ADMIN — Medication 1 TABLET(S): at 13:14

## 2017-05-05 RX ADMIN — Medication 4: at 13:15

## 2017-05-06 LAB
ALBUMIN SERPL ELPH-MCNC: 3.7 G/DL — SIGNIFICANT CHANGE UP (ref 3.3–5)
ALP SERPL-CCNC: 64 U/L — SIGNIFICANT CHANGE UP (ref 40–120)
ALT FLD-CCNC: 29 U/L RC — SIGNIFICANT CHANGE UP (ref 10–45)
ANION GAP SERPL CALC-SCNC: 9 MMOL/L — SIGNIFICANT CHANGE UP (ref 5–17)
APTT BLD: 101.8 SEC — HIGH (ref 27.5–37.4)
APTT BLD: 72.3 SEC — HIGH (ref 27.5–37.4)
AST SERPL-CCNC: 27 U/L — SIGNIFICANT CHANGE UP (ref 10–40)
BILIRUB SERPL-MCNC: 0.5 MG/DL — SIGNIFICANT CHANGE UP (ref 0.2–1.2)
BUN SERPL-MCNC: 17 MG/DL — SIGNIFICANT CHANGE UP (ref 7–23)
CALCIUM SERPL-MCNC: 9.9 MG/DL — SIGNIFICANT CHANGE UP (ref 8.4–10.5)
CHLORIDE SERPL-SCNC: 96 MMOL/L — SIGNIFICANT CHANGE UP (ref 96–108)
CO2 SERPL-SCNC: 36 MMOL/L — HIGH (ref 22–31)
CREAT SERPL-MCNC: 1 MG/DL — SIGNIFICANT CHANGE UP (ref 0.5–1.3)
GLUCOSE SERPL-MCNC: 243 MG/DL — HIGH (ref 70–99)
HCT VFR BLD CALC: 50.2 % — HIGH (ref 39–50)
HGB BLD-MCNC: 15.6 G/DL — SIGNIFICANT CHANGE UP (ref 13–17)
INR BLD: 1.13 RATIO — SIGNIFICANT CHANGE UP (ref 0.88–1.16)
MAGNESIUM SERPL-MCNC: 1.8 MG/DL — SIGNIFICANT CHANGE UP (ref 1.6–2.6)
MCHC RBC-ENTMCNC: 29.3 PG — SIGNIFICANT CHANGE UP (ref 27–34)
MCHC RBC-ENTMCNC: 31.1 GM/DL — LOW (ref 32–36)
MCV RBC AUTO: 94.2 FL — SIGNIFICANT CHANGE UP (ref 80–100)
PHOSPHATE SERPL-MCNC: 3.6 MG/DL — SIGNIFICANT CHANGE UP (ref 2.5–4.5)
PLATELET # BLD AUTO: 199 K/UL — SIGNIFICANT CHANGE UP (ref 150–400)
POTASSIUM SERPL-MCNC: 4.9 MMOL/L — SIGNIFICANT CHANGE UP (ref 3.5–5.3)
POTASSIUM SERPL-SCNC: 4.9 MMOL/L — SIGNIFICANT CHANGE UP (ref 3.5–5.3)
PROT SERPL-MCNC: 7.4 G/DL — SIGNIFICANT CHANGE UP (ref 6–8.3)
PROTHROM AB SERPL-ACNC: 12.2 SEC — SIGNIFICANT CHANGE UP (ref 9.8–12.7)
RBC # BLD: 5.33 M/UL — SIGNIFICANT CHANGE UP (ref 4.2–5.8)
RBC # FLD: 13.8 % — SIGNIFICANT CHANGE UP (ref 10.3–14.5)
SODIUM SERPL-SCNC: 141 MMOL/L — SIGNIFICANT CHANGE UP (ref 135–145)
TACROLIMUS SERPL-MCNC: 4.6 NG/ML — SIGNIFICANT CHANGE UP
WBC # BLD: 5.42 K/UL — SIGNIFICANT CHANGE UP (ref 3.8–10.5)
WBC # FLD AUTO: 5.42 K/UL — SIGNIFICANT CHANGE UP (ref 3.8–10.5)

## 2017-05-06 PROCEDURE — 99233 SBSQ HOSP IP/OBS HIGH 50: CPT | Mod: GC

## 2017-05-06 PROCEDURE — 99232 SBSQ HOSP IP/OBS MODERATE 35: CPT

## 2017-05-06 RX ORDER — INSULIN LISPRO 100/ML
20 VIAL (ML) SUBCUTANEOUS
Qty: 0 | Refills: 0 | Status: DISCONTINUED | OUTPATIENT
Start: 2017-05-06 | End: 2017-05-06

## 2017-05-06 RX ORDER — INSULIN GLARGINE 100 [IU]/ML
40 INJECTION, SOLUTION SUBCUTANEOUS AT BEDTIME
Qty: 0 | Refills: 0 | Status: DISCONTINUED | OUTPATIENT
Start: 2017-05-06 | End: 2017-05-07

## 2017-05-06 RX ORDER — INSULIN GLARGINE 100 [IU]/ML
10 INJECTION, SOLUTION SUBCUTANEOUS EVERY MORNING
Qty: 0 | Refills: 0 | Status: DISCONTINUED | OUTPATIENT
Start: 2017-05-06 | End: 2017-05-09

## 2017-05-06 RX ORDER — WARFARIN SODIUM 2.5 MG/1
7.5 TABLET ORAL ONCE
Qty: 0 | Refills: 0 | Status: DISCONTINUED | OUTPATIENT
Start: 2017-05-06 | End: 2017-05-06

## 2017-05-06 RX ORDER — ENOXAPARIN SODIUM 100 MG/ML
200 INJECTION SUBCUTANEOUS DAILY
Qty: 0 | Refills: 0 | Status: DISCONTINUED | OUTPATIENT
Start: 2017-05-06 | End: 2017-05-06

## 2017-05-06 RX ORDER — INSULIN LISPRO 100/ML
10 VIAL (ML) SUBCUTANEOUS
Qty: 0 | Refills: 0 | Status: DISCONTINUED | OUTPATIENT
Start: 2017-05-06 | End: 2017-05-07

## 2017-05-06 RX ORDER — INSULIN LISPRO 100/ML
12 VIAL (ML) SUBCUTANEOUS
Qty: 0 | Refills: 0 | Status: DISCONTINUED | OUTPATIENT
Start: 2017-05-06 | End: 2017-05-07

## 2017-05-06 RX ORDER — ENOXAPARIN SODIUM 100 MG/ML
140 INJECTION SUBCUTANEOUS
Qty: 0 | Refills: 0 | Status: DISCONTINUED | OUTPATIENT
Start: 2017-05-06 | End: 2017-05-08

## 2017-05-06 RX ADMIN — INSULIN GLARGINE 40 UNIT(S): 100 INJECTION, SOLUTION SUBCUTANEOUS at 21:45

## 2017-05-06 RX ADMIN — Medication 75 MILLIGRAM(S): at 06:18

## 2017-05-06 RX ADMIN — Medication 3 MILLILITER(S): at 23:29

## 2017-05-06 RX ADMIN — TACROLIMUS 2 MILLIGRAM(S): 5 CAPSULE ORAL at 18:11

## 2017-05-06 RX ADMIN — AZATHIOPRINE 50 MILLIGRAM(S): 100 TABLET ORAL at 12:15

## 2017-05-06 RX ADMIN — Medication 3 MILLILITER(S): at 17:23

## 2017-05-06 RX ADMIN — TACROLIMUS 2 MILLIGRAM(S): 5 CAPSULE ORAL at 06:34

## 2017-05-06 RX ADMIN — Medication 20 UNIT(S): at 18:10

## 2017-05-06 RX ADMIN — Medication 0.1 MILLIGRAM(S): at 18:10

## 2017-05-06 RX ADMIN — SODIUM CHLORIDE 3 MILLILITER(S): 9 INJECTION INTRAMUSCULAR; INTRAVENOUS; SUBCUTANEOUS at 12:16

## 2017-05-06 RX ADMIN — SODIUM CHLORIDE 3 MILLILITER(S): 9 INJECTION INTRAMUSCULAR; INTRAVENOUS; SUBCUTANEOUS at 06:18

## 2017-05-06 RX ADMIN — Medication 3 MILLILITER(S): at 11:25

## 2017-05-06 RX ADMIN — Medication 4: at 09:28

## 2017-05-06 RX ADMIN — SODIUM CHLORIDE 3 MILLILITER(S): 9 INJECTION INTRAMUSCULAR; INTRAVENOUS; SUBCUTANEOUS at 21:49

## 2017-05-06 RX ADMIN — Medication 1 TABLET(S): at 12:15

## 2017-05-06 RX ADMIN — Medication 81 MILLIGRAM(S): at 12:15

## 2017-05-06 RX ADMIN — Medication 3 MILLILITER(S): at 06:39

## 2017-05-06 RX ADMIN — ENOXAPARIN SODIUM 140 MILLIGRAM(S): 100 INJECTION SUBCUTANEOUS at 18:55

## 2017-05-06 RX ADMIN — INSULIN GLARGINE 10 UNIT(S): 100 INJECTION, SOLUTION SUBCUTANEOUS at 09:29

## 2017-05-06 RX ADMIN — ATORVASTATIN CALCIUM 20 MILLIGRAM(S): 80 TABLET, FILM COATED ORAL at 21:45

## 2017-05-06 RX ADMIN — Medication 2: at 18:10

## 2017-05-06 RX ADMIN — Medication 16 UNIT(S): at 13:33

## 2017-05-06 RX ADMIN — Medication 0.1 MILLIGRAM(S): at 06:34

## 2017-05-06 RX ADMIN — PANTOPRAZOLE SODIUM 40 MILLIGRAM(S): 20 TABLET, DELAYED RELEASE ORAL at 12:15

## 2017-05-06 RX ADMIN — AMLODIPINE BESYLATE 10 MILLIGRAM(S): 2.5 TABLET ORAL at 06:34

## 2017-05-06 RX ADMIN — Medication 25 MILLIGRAM(S): at 06:34

## 2017-05-06 RX ADMIN — Medication 75 MILLIGRAM(S): at 18:11

## 2017-05-06 RX ADMIN — Medication 25 MILLIGRAM(S): at 16:11

## 2017-05-06 RX ADMIN — HEPARIN SODIUM 2100 UNIT(S)/HR: 5000 INJECTION INTRAVENOUS; SUBCUTANEOUS at 08:45

## 2017-05-06 RX ADMIN — Medication 16 UNIT(S): at 09:29

## 2017-05-06 RX ADMIN — Medication 2: at 13:33

## 2017-05-07 LAB
ALBUMIN SERPL ELPH-MCNC: 3.3 G/DL — SIGNIFICANT CHANGE UP (ref 3.3–5)
ALP SERPL-CCNC: 57 U/L — SIGNIFICANT CHANGE UP (ref 40–120)
ALT FLD-CCNC: 23 U/L RC — SIGNIFICANT CHANGE UP (ref 10–45)
ANION GAP SERPL CALC-SCNC: 11 MMOL/L — SIGNIFICANT CHANGE UP (ref 5–17)
AST SERPL-CCNC: 19 U/L — SIGNIFICANT CHANGE UP (ref 10–40)
BILIRUB SERPL-MCNC: 0.4 MG/DL — SIGNIFICANT CHANGE UP (ref 0.2–1.2)
BUN SERPL-MCNC: 17 MG/DL — SIGNIFICANT CHANGE UP (ref 7–23)
CALCIUM SERPL-MCNC: 9.6 MG/DL — SIGNIFICANT CHANGE UP (ref 8.4–10.5)
CHLORIDE SERPL-SCNC: 97 MMOL/L — SIGNIFICANT CHANGE UP (ref 96–108)
CO2 SERPL-SCNC: 34 MMOL/L — HIGH (ref 22–31)
CREAT SERPL-MCNC: 0.97 MG/DL — SIGNIFICANT CHANGE UP (ref 0.5–1.3)
GLUCOSE SERPL-MCNC: 219 MG/DL — HIGH (ref 70–99)
HCT VFR BLD CALC: 46.2 % — SIGNIFICANT CHANGE UP (ref 39–50)
HGB BLD-MCNC: 14.9 G/DL — SIGNIFICANT CHANGE UP (ref 13–17)
INR BLD: 1.24 RATIO — HIGH (ref 0.88–1.16)
MAGNESIUM SERPL-MCNC: 1.7 MG/DL — SIGNIFICANT CHANGE UP (ref 1.6–2.6)
MCHC RBC-ENTMCNC: 29.6 PG — SIGNIFICANT CHANGE UP (ref 27–34)
MCHC RBC-ENTMCNC: 32.2 GM/DL — SIGNIFICANT CHANGE UP (ref 32–36)
MCV RBC AUTO: 91.8 FL — SIGNIFICANT CHANGE UP (ref 80–100)
PHOSPHATE SERPL-MCNC: 4.1 MG/DL — SIGNIFICANT CHANGE UP (ref 2.5–4.5)
PLATELET # BLD AUTO: 194 K/UL — SIGNIFICANT CHANGE UP (ref 150–400)
POTASSIUM SERPL-MCNC: 4.7 MMOL/L — SIGNIFICANT CHANGE UP (ref 3.5–5.3)
POTASSIUM SERPL-SCNC: 4.7 MMOL/L — SIGNIFICANT CHANGE UP (ref 3.5–5.3)
PROT SERPL-MCNC: 6.9 G/DL — SIGNIFICANT CHANGE UP (ref 6–8.3)
PROTHROM AB SERPL-ACNC: 13.6 SEC — HIGH (ref 9.8–12.7)
RBC # BLD: 5.03 M/UL — SIGNIFICANT CHANGE UP (ref 4.2–5.8)
RBC # FLD: 13.1 % — SIGNIFICANT CHANGE UP (ref 10.3–14.5)
SODIUM SERPL-SCNC: 142 MMOL/L — SIGNIFICANT CHANGE UP (ref 135–145)
TACROLIMUS SERPL-MCNC: 4.7 NG/ML — SIGNIFICANT CHANGE UP
WBC # BLD: 4.8 K/UL — SIGNIFICANT CHANGE UP (ref 3.8–10.5)
WBC # FLD AUTO: 4.8 K/UL — SIGNIFICANT CHANGE UP (ref 3.8–10.5)

## 2017-05-07 PROCEDURE — 99232 SBSQ HOSP IP/OBS MODERATE 35: CPT

## 2017-05-07 PROCEDURE — 99233 SBSQ HOSP IP/OBS HIGH 50: CPT | Mod: GC

## 2017-05-07 RX ORDER — INSULIN GLARGINE 100 [IU]/ML
50 INJECTION, SOLUTION SUBCUTANEOUS AT BEDTIME
Qty: 0 | Refills: 0 | Status: DISCONTINUED | OUTPATIENT
Start: 2017-05-07 | End: 2017-05-08

## 2017-05-07 RX ORDER — INSULIN LISPRO 100/ML
15 VIAL (ML) SUBCUTANEOUS
Qty: 0 | Refills: 0 | Status: DISCONTINUED | OUTPATIENT
Start: 2017-05-08 | End: 2017-05-09

## 2017-05-07 RX ORDER — INSULIN LISPRO 100/ML
20 VIAL (ML) SUBCUTANEOUS
Qty: 0 | Refills: 0 | Status: DISCONTINUED | OUTPATIENT
Start: 2017-05-07 | End: 2017-05-09

## 2017-05-07 RX ORDER — MAGNESIUM SULFATE 500 MG/ML
2 VIAL (ML) INJECTION ONCE
Qty: 0 | Refills: 0 | Status: COMPLETED | OUTPATIENT
Start: 2017-05-07 | End: 2017-05-07

## 2017-05-07 RX ORDER — INSULIN LISPRO 100/ML
16 VIAL (ML) SUBCUTANEOUS
Qty: 0 | Refills: 0 | Status: DISCONTINUED | OUTPATIENT
Start: 2017-05-07 | End: 2017-05-09

## 2017-05-07 RX ORDER — WARFARIN SODIUM 2.5 MG/1
7.5 TABLET ORAL ONCE
Qty: 0 | Refills: 0 | Status: DISCONTINUED | OUTPATIENT
Start: 2017-05-07 | End: 2017-05-07

## 2017-05-07 RX ADMIN — Medication 50 GRAM(S): at 10:27

## 2017-05-07 RX ADMIN — TACROLIMUS 2 MILLIGRAM(S): 5 CAPSULE ORAL at 06:31

## 2017-05-07 RX ADMIN — Medication 16 UNIT(S): at 13:18

## 2017-05-07 RX ADMIN — ENOXAPARIN SODIUM 140 MILLIGRAM(S): 100 INJECTION SUBCUTANEOUS at 06:31

## 2017-05-07 RX ADMIN — Medication 2: at 13:17

## 2017-05-07 RX ADMIN — INSULIN GLARGINE 10 UNIT(S): 100 INJECTION, SOLUTION SUBCUTANEOUS at 08:54

## 2017-05-07 RX ADMIN — Medication 2: at 08:53

## 2017-05-07 RX ADMIN — Medication 0.1 MILLIGRAM(S): at 17:55

## 2017-05-07 RX ADMIN — Medication 81 MILLIGRAM(S): at 13:16

## 2017-05-07 RX ADMIN — PANTOPRAZOLE SODIUM 40 MILLIGRAM(S): 20 TABLET, DELAYED RELEASE ORAL at 13:17

## 2017-05-07 RX ADMIN — Medication 25 MILLIGRAM(S): at 21:12

## 2017-05-07 RX ADMIN — Medication 0.1 MILLIGRAM(S): at 06:30

## 2017-05-07 RX ADMIN — Medication 3 MILLILITER(S): at 12:00

## 2017-05-07 RX ADMIN — Medication 75 MILLIGRAM(S): at 06:29

## 2017-05-07 RX ADMIN — Medication 3 MILLILITER(S): at 23:44

## 2017-05-07 RX ADMIN — Medication 20 UNIT(S): at 17:54

## 2017-05-07 RX ADMIN — Medication 2: at 17:55

## 2017-05-07 RX ADMIN — SODIUM CHLORIDE 3 MILLILITER(S): 9 INJECTION INTRAMUSCULAR; INTRAVENOUS; SUBCUTANEOUS at 06:38

## 2017-05-07 RX ADMIN — INSULIN GLARGINE 50 UNIT(S): 100 INJECTION, SOLUTION SUBCUTANEOUS at 21:13

## 2017-05-07 RX ADMIN — Medication 25 MILLIGRAM(S): at 13:16

## 2017-05-07 RX ADMIN — Medication 1 TABLET(S): at 13:17

## 2017-05-07 RX ADMIN — Medication 12 UNIT(S): at 08:54

## 2017-05-07 RX ADMIN — AMLODIPINE BESYLATE 10 MILLIGRAM(S): 2.5 TABLET ORAL at 06:33

## 2017-05-07 RX ADMIN — ATORVASTATIN CALCIUM 20 MILLIGRAM(S): 80 TABLET, FILM COATED ORAL at 21:13

## 2017-05-07 RX ADMIN — Medication 3 MILLILITER(S): at 06:13

## 2017-05-07 RX ADMIN — Medication 3 MILLILITER(S): at 17:23

## 2017-05-07 RX ADMIN — Medication 75 MILLIGRAM(S): at 17:56

## 2017-05-07 RX ADMIN — TACROLIMUS 2 MILLIGRAM(S): 5 CAPSULE ORAL at 17:56

## 2017-05-07 RX ADMIN — SODIUM CHLORIDE 3 MILLILITER(S): 9 INJECTION INTRAMUSCULAR; INTRAVENOUS; SUBCUTANEOUS at 16:07

## 2017-05-07 RX ADMIN — Medication 25 MILLIGRAM(S): at 06:29

## 2017-05-07 RX ADMIN — SODIUM CHLORIDE 3 MILLILITER(S): 9 INJECTION INTRAMUSCULAR; INTRAVENOUS; SUBCUTANEOUS at 22:58

## 2017-05-07 RX ADMIN — ENOXAPARIN SODIUM 140 MILLIGRAM(S): 100 INJECTION SUBCUTANEOUS at 17:58

## 2017-05-07 RX ADMIN — AZATHIOPRINE 50 MILLIGRAM(S): 100 TABLET ORAL at 13:16

## 2017-05-07 RX ADMIN — Medication 25 MILLIGRAM(S): at 02:19

## 2017-05-08 ENCOUNTER — TRANSCRIPTION ENCOUNTER (OUTPATIENT)
Age: 52
End: 2017-05-08

## 2017-05-08 LAB
ALBUMIN SERPL ELPH-MCNC: 3.5 G/DL — SIGNIFICANT CHANGE UP (ref 3.3–5)
ALP SERPL-CCNC: 61 U/L — SIGNIFICANT CHANGE UP (ref 40–120)
ALT FLD-CCNC: 22 U/L RC — SIGNIFICANT CHANGE UP (ref 10–45)
ANION GAP SERPL CALC-SCNC: 10 MMOL/L — SIGNIFICANT CHANGE UP (ref 5–17)
AST SERPL-CCNC: 19 U/L — SIGNIFICANT CHANGE UP (ref 10–40)
BILIRUB SERPL-MCNC: 0.4 MG/DL — SIGNIFICANT CHANGE UP (ref 0.2–1.2)
BUN SERPL-MCNC: 19 MG/DL — SIGNIFICANT CHANGE UP (ref 7–23)
CALCIUM SERPL-MCNC: 9.8 MG/DL — SIGNIFICANT CHANGE UP (ref 8.4–10.5)
CHLORIDE SERPL-SCNC: 97 MMOL/L — SIGNIFICANT CHANGE UP (ref 96–108)
CO2 SERPL-SCNC: 35 MMOL/L — HIGH (ref 22–31)
CREAT SERPL-MCNC: 1.07 MG/DL — SIGNIFICANT CHANGE UP (ref 0.5–1.3)
GLUCOSE SERPL-MCNC: 172 MG/DL — HIGH (ref 70–99)
HCT VFR BLD CALC: 47.2 % — SIGNIFICANT CHANGE UP (ref 39–50)
HGB BLD-MCNC: 15.1 G/DL — SIGNIFICANT CHANGE UP (ref 13–17)
INR BLD: 1.07 RATIO — SIGNIFICANT CHANGE UP (ref 0.88–1.16)
MAGNESIUM SERPL-MCNC: 1.9 MG/DL — SIGNIFICANT CHANGE UP (ref 1.6–2.6)
MCHC RBC-ENTMCNC: 29.2 PG — SIGNIFICANT CHANGE UP (ref 27–34)
MCHC RBC-ENTMCNC: 32 GM/DL — SIGNIFICANT CHANGE UP (ref 32–36)
MCV RBC AUTO: 91.4 FL — SIGNIFICANT CHANGE UP (ref 80–100)
PHOSPHATE SERPL-MCNC: 4 MG/DL — SIGNIFICANT CHANGE UP (ref 2.5–4.5)
PLATELET # BLD AUTO: 201 K/UL — SIGNIFICANT CHANGE UP (ref 150–400)
POTASSIUM SERPL-MCNC: 4.3 MMOL/L — SIGNIFICANT CHANGE UP (ref 3.5–5.3)
POTASSIUM SERPL-SCNC: 4.3 MMOL/L — SIGNIFICANT CHANGE UP (ref 3.5–5.3)
PROT SERPL-MCNC: 7.2 G/DL — SIGNIFICANT CHANGE UP (ref 6–8.3)
PROTHROM AB SERPL-ACNC: 11.6 SEC — SIGNIFICANT CHANGE UP (ref 9.8–12.7)
RBC # BLD: 5.17 M/UL — SIGNIFICANT CHANGE UP (ref 4.2–5.8)
RBC # FLD: 13.1 % — SIGNIFICANT CHANGE UP (ref 10.3–14.5)
SODIUM SERPL-SCNC: 142 MMOL/L — SIGNIFICANT CHANGE UP (ref 135–145)
TACROLIMUS SERPL-MCNC: 4.2 NG/ML — SIGNIFICANT CHANGE UP
WBC # BLD: 5.1 K/UL — SIGNIFICANT CHANGE UP (ref 3.8–10.5)
WBC # FLD AUTO: 5.1 K/UL — SIGNIFICANT CHANGE UP (ref 3.8–10.5)

## 2017-05-08 PROCEDURE — 99232 SBSQ HOSP IP/OBS MODERATE 35: CPT

## 2017-05-08 PROCEDURE — 99239 HOSP IP/OBS DSCHRG MGMT >30: CPT

## 2017-05-08 RX ORDER — INSULIN GLARGINE 100 [IU]/ML
40 INJECTION, SOLUTION SUBCUTANEOUS AT BEDTIME
Qty: 0 | Refills: 0 | Status: DISCONTINUED | OUTPATIENT
Start: 2017-05-08 | End: 2017-05-09

## 2017-05-08 RX ORDER — INSULIN LISPRO 100/ML
15 VIAL (ML) SUBCUTANEOUS ONCE
Qty: 0 | Refills: 0 | Status: COMPLETED | OUTPATIENT
Start: 2017-05-08 | End: 2017-05-08

## 2017-05-08 RX ORDER — APIXABAN 2.5 MG/1
5 TABLET, FILM COATED ORAL
Qty: 0 | Refills: 0 | Status: DISCONTINUED | OUTPATIENT
Start: 2017-05-08 | End: 2017-05-09

## 2017-05-08 RX ADMIN — Medication 75 MILLIGRAM(S): at 17:28

## 2017-05-08 RX ADMIN — Medication 15 UNIT(S): at 18:41

## 2017-05-08 RX ADMIN — Medication 3 MILLILITER(S): at 23:35

## 2017-05-08 RX ADMIN — Medication 16 UNIT(S): at 13:07

## 2017-05-08 RX ADMIN — INSULIN GLARGINE 40 UNIT(S): 100 INJECTION, SOLUTION SUBCUTANEOUS at 21:23

## 2017-05-08 RX ADMIN — PANTOPRAZOLE SODIUM 40 MILLIGRAM(S): 20 TABLET, DELAYED RELEASE ORAL at 12:02

## 2017-05-08 RX ADMIN — ATORVASTATIN CALCIUM 20 MILLIGRAM(S): 80 TABLET, FILM COATED ORAL at 21:22

## 2017-05-08 RX ADMIN — SODIUM CHLORIDE 3 MILLILITER(S): 9 INJECTION INTRAMUSCULAR; INTRAVENOUS; SUBCUTANEOUS at 06:25

## 2017-05-08 RX ADMIN — Medication 15 UNIT(S): at 09:11

## 2017-05-08 RX ADMIN — TACROLIMUS 2 MILLIGRAM(S): 5 CAPSULE ORAL at 06:20

## 2017-05-08 RX ADMIN — TACROLIMUS 2 MILLIGRAM(S): 5 CAPSULE ORAL at 18:01

## 2017-05-08 RX ADMIN — Medication 1 TABLET(S): at 12:02

## 2017-05-08 RX ADMIN — Medication 2: at 09:11

## 2017-05-08 RX ADMIN — Medication 3 MILLILITER(S): at 11:38

## 2017-05-08 RX ADMIN — Medication 25 MILLIGRAM(S): at 06:21

## 2017-05-08 RX ADMIN — Medication 25 MILLIGRAM(S): at 21:22

## 2017-05-08 RX ADMIN — Medication 3 MILLILITER(S): at 05:05

## 2017-05-08 RX ADMIN — Medication 25 MILLIGRAM(S): at 14:39

## 2017-05-08 RX ADMIN — AZATHIOPRINE 50 MILLIGRAM(S): 100 TABLET ORAL at 12:02

## 2017-05-08 RX ADMIN — Medication 75 MILLIGRAM(S): at 06:21

## 2017-05-08 RX ADMIN — SODIUM CHLORIDE 3 MILLILITER(S): 9 INJECTION INTRAMUSCULAR; INTRAVENOUS; SUBCUTANEOUS at 14:11

## 2017-05-08 RX ADMIN — SODIUM CHLORIDE 3 MILLILITER(S): 9 INJECTION INTRAMUSCULAR; INTRAVENOUS; SUBCUTANEOUS at 21:21

## 2017-05-08 RX ADMIN — Medication 3 MILLILITER(S): at 17:08

## 2017-05-08 RX ADMIN — INSULIN GLARGINE 10 UNIT(S): 100 INJECTION, SOLUTION SUBCUTANEOUS at 09:12

## 2017-05-08 RX ADMIN — Medication 0.1 MILLIGRAM(S): at 06:22

## 2017-05-08 RX ADMIN — AMLODIPINE BESYLATE 10 MILLIGRAM(S): 2.5 TABLET ORAL at 06:21

## 2017-05-08 RX ADMIN — Medication 81 MILLIGRAM(S): at 12:03

## 2017-05-08 RX ADMIN — ENOXAPARIN SODIUM 140 MILLIGRAM(S): 100 INJECTION SUBCUTANEOUS at 06:13

## 2017-05-08 RX ADMIN — APIXABAN 5 MILLIGRAM(S): 2.5 TABLET, FILM COATED ORAL at 19:28

## 2017-05-08 RX ADMIN — Medication 2: at 13:06

## 2017-05-08 RX ADMIN — Medication 0.1 MILLIGRAM(S): at 17:28

## 2017-05-08 NOTE — DISCHARGE NOTE ADULT - HOSPITAL COURSE
51 year old male retired  with 9/11 exposure c/b chronic cough on BIPAP during recent admission (recent outpt CT scan) with PMH o/w significant for CKD s/p renal transplant (Dec 2013), DM , HTN, HLD, obesity, CARMEN, CVa ( 2015) w/ residual sensory deficit on left upper extremity,  p/w SOB and cough, intubated for hypercapnic hypoxic resp failure in ED  4/24: intubated in ED. s/p vanc, CTX and zosyn in the ED. started on CTX and zosyn. decrease RR to 16. bedside echo does not show RV failure. keep on vanc/zosyn. abg in an hr, aim CO2 70, glucerna for feeds. TTE, renal transplant cs, duplex LE. NPH 40x1 for high sugars. NS@75. prograf and metoprolol being held as pt septic.   O/N: NPH 10 instead of 40 as 's, paCO2 70's with pH 7.28, inc TV to 550  4/26: put on precedex, wean off fent, attempt extubation. bipap as needed. reinstate immunosuppressive drugs. dc vanc, keep zosynx5 days, changing SSI to high dose. extubated.  extubated this a.m. on high flow, consider bipap at night. Spiked fever - blood culture obtained, tylenol I.V., Hypertensive, received hydralzine, labetolol, with improvement of fever and meds heart rate improved, BP improved.  Now out of sepsis, will restart immunotherapy.  O/N: hypertensive, hydral 10. Speech and swallow ordered overnight, did not tolerate BIPAP was changed to CPAP 8 was comfortable.   4/27: bedside S&S. metoprolol and amlodipine when able to tolerate PO. hydralazine pushes for now. Renal felt until patient with improved status will hold azathioprine -   4/28: Attempt to pull adams catheter again today. Taper down high flow nasal cannula. Change Bactrim to PO. titrate fio2 in the am - f/u with ABG and chest x ray in the am   4/29 Hydralazine increased to 75mg q 6hrs lasix 20 mg x 2 given will follow up with BMP mag and Phos   4/30 - (+) Popleteal DVT Heparin gtt started - 5/1: Bridging heparin and coumadin for dvt, with eventual transition to apixaban continue with cpap at [facility] while resting. continue with antibiotics for ten days total.  5/1 night NP - Lasix 20 IVP x1 per Dr. Miranda; pt with hematuria with small blood clot, urin cranberry color now; continue heparin gtt per Dr. Miranda, CBC Q 6, house urology called, spoke to Dr. Navarro - keep PTT on lower end of goal range; renal fellow called ; transplant kidney US ordered   5/2:neg US of Kidney. Hematuria has improved. ASA decreased to 81 continue to bridge heparin with coumadin. No more Lasix at this time until contraction alkalosis has improved.  5/3 cUrine cytology (-) adams placed for retention Urology following   05/03 Bladder scan with 0 residual urine, adams patent with hematuria getting cleared.    Urine has remained clear. Patient has transitioned to apixaban without any bleeding episones. Patient now requires 3 L NC during day time with cpap at night. Discharged to home.

## 2017-05-08 NOTE — DISCHARGE NOTE ADULT - ADDITIONAL INSTRUCTIONS
Continue to use your bi-level breathing machine at night and if you take naps during the daytime. Continue to use your CPAP breathing machine at night and if you take naps during the daytime. See remaining instructions above.

## 2017-05-08 NOTE — DISCHARGE NOTE ADULT - MEDICATION SUMMARY - MEDICATIONS TO TAKE
I will START or STAY ON the medications listed below when I get home from the hospital:    aspirin 81 mg oral delayed release tablet  -- 1 tab(s) by mouth once a day  -- Indication: For Sstroke prevention    cloNIDine 0.1 mg oral tablet  -- 1 tab(s) by mouth every 12 hours  -- Indication: For blood pressure    apixaban 5 mg oral tablet  -- 1 tab(s) by mouth 2 times a day  -- Indication: For blood thiner    insulin glargine 100 units/mL subcutaneous solution  -- 25 unit(s) subcutaneous once a day (in the morning)  -- Do not drink alcoholic beverages when taking this medication.  It is very important that you take or use this exactly as directed.  Do not skip doses or discontinue unless directed by your doctor.  Keep in refrigerator.  Do not freeze.    -- Indication: For Diabetes mellitus    insulin glargine 100 units/mL subcutaneous solution  -- 25 unit(s) subcutaneous once a day (at bedtime)  -- Do not drink alcoholic beverages when taking this medication.  It is very important that you take or use this exactly as directed.  Do not skip doses or discontinue unless directed by your doctor.  Keep in refrigerator.  Do not freeze.    -- Indication: For Diabetes mellitus    insulin lispro 100 units/mL subcutaneous solution  -- 12 unit(s) subcutaneous 3 times a day (before meals)  -- Do not drink alcoholic beverages when taking this medication.  It is very important that you take or use this exactly as directed.  Do not skip doses or discontinue unless directed by your doctor.  Keep in refrigerator.  Do not freeze.    -- Indication: For Diabetes mellitus    atorvastatin 20 mg oral tablet  -- 1 tab(s) by mouth once a day (at bedtime)  -- Indication: For Cholesterol    metoprolol tartrate 75 mg oral tablet  -- 1 tab(s) by mouth 2 times a day  -- Indication: For blood pressure    Combivent Respimat CFC free 20 mcg-100 mcg/inh inhalation aerosol  -- 1 puff(s) inhaled 4 times a day, As Needed  -- Indication: For Shortness of breath or wheezing    famotidine 20 mg oral tablet  -- 1 tab(s) by mouth 2 times a day  -- Indication: For reflux/heartburn    azaTHIOprine 50 mg oral tablet  -- 2 tab(s) by mouth once a day via peg  -- Indication: For immunosuppresive therapy    tacrolimus 1 mg oral capsule  -- 2 cap(s) by mouth 2 times a day  -- Indication: For immunosuppresive therapy    senna 8.8 mg/5 mL oral syrup  -- 5 milliliter(s) by mouth once a day  -- Indication: For Constipation    polyethylene glycol 3350 oral powder for reconstitution  -- 17 gram(s) by mouth 2 times a day  -- Indication: For Constipation    sulfamethoxazole-trimethoprim 400 mg-80 mg oral tablet  -- 1 tab(s) by mouth once a day   -- Indication: For Prophylaxis treatment    hydrALAZINE 25 mg oral tablet  -- 1 tab(s) by mouth every 8 hours  -- Indication: For blood pressure I will START or STAY ON the medications listed below when I get home from the hospital:    aspirin 81 mg oral delayed release tablet  -- 1 tab(s) by mouth once a day  -- Indication: For Sstroke prevention    cloNIDine 0.1 mg oral tablet  -- 1 tab(s) by mouth every 12 hours  -- Indication: For blood pressure    apixaban 5 mg oral tablet  -- 2 tab(s) by mouth 2 times a day for 7 days until 5/17  -- Indication: For blood thinner    apixaban 5 mg oral tablet  -- 1 tab(s) by mouth 2 times a day  -- Indication: For blood thiner    insulin glargine 100 units/mL subcutaneous solution  -- 25 unit(s) subcutaneous once a day (in the morning)  -- Do not drink alcoholic beverages when taking this medication.  It is very important that you take or use this exactly as directed.  Do not skip doses or discontinue unless directed by your doctor.  Keep in refrigerator.  Do not freeze.    -- Indication: For Diabetes mellitus    insulin glargine 100 units/mL subcutaneous solution  -- 25 unit(s) subcutaneous once a day (at bedtime)  -- Do not drink alcoholic beverages when taking this medication.  It is very important that you take or use this exactly as directed.  Do not skip doses or discontinue unless directed by your doctor.  Keep in refrigerator.  Do not freeze.    -- Indication: For Diabetes mellitus    insulin lispro 100 units/mL subcutaneous solution  -- 12 unit(s) subcutaneous 3 times a day (before meals)  -- Do not drink alcoholic beverages when taking this medication.  It is very important that you take or use this exactly as directed.  Do not skip doses or discontinue unless directed by your doctor.  Keep in refrigerator.  Do not freeze.    -- Indication: For Diabetes mellitus    atorvastatin 20 mg oral tablet  -- 1 tab(s) by mouth once a day (at bedtime)  -- Indication: For Cholesterol    metoprolol tartrate 75 mg oral tablet  -- 1 tab(s) by mouth 2 times a day  -- Indication: For blood pressure    Combivent Respimat CFC free 20 mcg-100 mcg/inh inhalation aerosol  -- 1 puff(s) inhaled 4 times a day, As Needed  -- Indication: For Shortness of breath or wheezing    famotidine 20 mg oral tablet  -- 1 tab(s) by mouth 2 times a day  -- Indication: For reflux/heartburn    azaTHIOprine 50 mg oral tablet  -- 2 tab(s) by mouth once a day via peg  -- Indication: For immunosuppresive therapy    tacrolimus 1 mg oral capsule  -- 2 cap(s) by mouth 2 times a day  -- Indication: For immunosuppresive therapy    senna 8.8 mg/5 mL oral syrup  -- 5 milliliter(s) by mouth once a day  -- Indication: For Constipation    polyethylene glycol 3350 oral powder for reconstitution  -- 17 gram(s) by mouth 2 times a day  -- Indication: For Constipation    sulfamethoxazole-trimethoprim 400 mg-80 mg oral tablet  -- 1 tab(s) by mouth once a day   -- Indication: For Prophylaxis treatment    hydrALAZINE 25 mg oral tablet  -- 1 tab(s) by mouth every 8 hours  -- Indication: For blood pressure

## 2017-05-08 NOTE — DISCHARGE NOTE ADULT - CARE PROVIDER_API CALL
Jasbir Garcia), EndocrinologyMetabDiabetes; Internal Medicine  [address] [facility] [address]  [place] [address]  Phone: [phone]  Fax: [phone]    renal, team  Phone: (   )    -  Fax: (   )    -    Kobe Miranda), Internal Medicine  [address] [address]  Phone: [phone]  Fax: [phone]    Lisker, Gita N (MD), Critical Care Medicine  [address] [place] [address]  Phone: [phone]  Fax: [phone]

## 2017-05-08 NOTE — DISCHARGE NOTE ADULT - MEDICATION SUMMARY - MEDICATIONS TO CHANGE
I will SWITCH the dose or number of times a day I take the medications listed below when I get home from the hospital:    aspirin 325 mg oral tablet  -- 1 tab(s) by mouth once a day   via peg    metoprolol tartrate 50 mg oral tablet  -- 1 tab(s) by mouth 2 times a day  via peg    insulin glargine  -- 80 unit(s) subcutaneous once a day - daily    insulin lispro 100 units/mL subcutaneous solution    tacrolimus 0.5 mg oral capsule  -- 1.5 milligram(s) by mouth 2 times a day

## 2017-05-08 NOTE — DISCHARGE NOTE ADULT - CARE PROVIDERS DIRECT ADDRESSES
[email or web address],[email or web address]_[email or web address]

## 2017-05-08 NOTE — DISCHARGE NOTE ADULT - SECONDARY DIAGNOSIS.
MAYRA (acute kidney injury) Diabetes mellitus Hypertension Suspected deep vein thrombosis Transplant recipient

## 2017-05-08 NOTE — DISCHARGE NOTE ADULT - PATIENT PORTAL LINK FT
“You can access the Follow[facility] Patient Portal, offered by [facility], by registering with the following website: [email or web address]”

## 2017-05-08 NOTE — DISCHARGE NOTE ADULT - CARE PLAN
Principal Discharge DX:	Hypoxia  Goal:	to no longer rely on daytime oxygen  Instructions for follow-up, activity and diet:	Continue  wearing your oxygen during day time until your follow up appointment in 2 weeks with your pulmonologist  Secondary Diagnosis:	MAYRA (acute kidney injury)  Instructions for follow-up, activity and diet:	Continue follow up with the renal transplant team within one week from your hospital discharge  Secondary Diagnosis:	Diabetes mellitus  Instructions for follow-up, activity and diet:	Please take your prescribed medication regimen for your diabetes. You will need to follow up with your endocrinologist within one week after your discharge from the facility  Secondary Diagnosis:	Hypertension  Instructions for follow-up, activity and diet:	Continue with your blood pressure medication regimen  Secondary Diagnosis:	Suspected deep vein thrombosis  Instructions for follow-up, activity and diet:	continue with your apixaban. Please follow up with your medical internist for ongoing management of your DVT. You will need to be on this for up to 6 months before being re-evaluated to continue this medication.  Secondary Diagnosis:	Transplant recipient  Instructions for follow-up, activity and diet:	Continue with your renal transplant management team within 2 weeks after discharge from facility

## 2017-05-08 NOTE — DISCHARGE NOTE ADULT - PLAN OF CARE
to no longer rely on daytime oxygen Continue  wearing your oxygen during day time until your follow up appointment in 2 weeks with your pulmonologist Continue follow up with the renal transplant team within one week from your hospital discharge Please take your prescribed medication regimen for your diabetes. You will need to follow up with your endocrinologist within one week after your discharge from the facility Continue with your blood pressure medication regimen continue with your apixaban. Please follow up with your medical internist for ongoing management of your DVT. You will need to be on this for up to 6 months before being re-evaluated to continue this medication. Continue with your renal transplant management team within 2 weeks after discharge from facility

## 2017-05-08 NOTE — DISCHARGE NOTE ADULT - PROVIDER TOKENS
TOKEN:'[id number]',FREE:[LAST:[renal],FIRST:[team],PHONE:[(   )    -],FAX:[(   )    -]],TOKEN:'[id number]',[id number]'

## 2017-05-08 NOTE — DISCHARGE NOTE ADULT - MEDICATION SUMMARY - MEDICATIONS TO STOP TAKING
I will STOP taking the medications listed below when I get home from the hospital:    acetaminophen 160 mg/5 mL oral suspension  -- 20.31 milliliter(s) by mouth every 6 hours, As needed, For Temp over 37.9 C (100.2 F)\  via peg    acetaminophen 325 mg oral tablet  -- 2 tab(s) by mouth once, As needed, Moderate Pain  via peg    heparin  -- 5000 unit(s) subcutaneous every 8 hours    amLODIPine 5 mg oral tablet  -- 1 tab(s) by mouth once a day  via peg    sodium chloride 0.65% nasal spray  --  into nose  1 spray both nostrils 4 times a day prn    ocular lubricant ophthalmic solution  -- 1 drop(s) to each affected eye every 12 hours  RIGHT eye    freetext medication  -  -- valcyte  900 milligram(s) by mouth every 24 hours  DC 12/16  via peg

## 2017-05-09 VITALS
HEART RATE: 72 BPM | TEMPERATURE: 98 F | SYSTOLIC BLOOD PRESSURE: 140 MMHG | OXYGEN SATURATION: 95 % | DIASTOLIC BLOOD PRESSURE: 78 MMHG | RESPIRATION RATE: 18 BRPM

## 2017-05-09 LAB
ALBUMIN SERPL ELPH-MCNC: 3.5 G/DL — SIGNIFICANT CHANGE UP (ref 3.3–5)
ALP SERPL-CCNC: 63 U/L — SIGNIFICANT CHANGE UP (ref 40–120)
ALT FLD-CCNC: 24 U/L RC — SIGNIFICANT CHANGE UP (ref 10–45)
AST SERPL-CCNC: 24 U/L — SIGNIFICANT CHANGE UP (ref 10–40)
BILIRUB SERPL-MCNC: 0.5 MG/DL — SIGNIFICANT CHANGE UP (ref 0.2–1.2)
BUN SERPL-MCNC: 18 MG/DL — SIGNIFICANT CHANGE UP (ref 7–23)
CALCIUM SERPL-MCNC: 10 MG/DL — SIGNIFICANT CHANGE UP (ref 8.4–10.5)
CHLORIDE SERPL-SCNC: 99 MMOL/L — SIGNIFICANT CHANGE UP (ref 96–108)
CO2 SERPL-SCNC: 31 MMOL/L — SIGNIFICANT CHANGE UP (ref 22–31)
CREAT SERPL-MCNC: 1.01 MG/DL — SIGNIFICANT CHANGE UP (ref 0.5–1.3)
GLUCOSE SERPL-MCNC: 124 MG/DL — HIGH (ref 70–99)
HCT VFR BLD CALC: 47.3 % — SIGNIFICANT CHANGE UP (ref 39–50)
HGB BLD-MCNC: 15.6 G/DL — SIGNIFICANT CHANGE UP (ref 13–17)
INR BLD: 1.07 RATIO — SIGNIFICANT CHANGE UP (ref 0.88–1.16)
MAGNESIUM SERPL-MCNC: 1.9 MG/DL — SIGNIFICANT CHANGE UP (ref 1.6–2.6)
MCHC RBC-ENTMCNC: 30.4 PG — SIGNIFICANT CHANGE UP (ref 27–34)
MCHC RBC-ENTMCNC: 33 GM/DL — SIGNIFICANT CHANGE UP (ref 32–36)
MCV RBC AUTO: 92.1 FL — SIGNIFICANT CHANGE UP (ref 80–100)
PHOSPHATE SERPL-MCNC: 4.2 MG/DL — SIGNIFICANT CHANGE UP (ref 2.5–4.5)
PLATELET # BLD AUTO: 220 K/UL — SIGNIFICANT CHANGE UP (ref 150–400)
POTASSIUM SERPL-MCNC: 4.7 MMOL/L — SIGNIFICANT CHANGE UP (ref 3.5–5.3)
POTASSIUM SERPL-SCNC: 4.7 MMOL/L — SIGNIFICANT CHANGE UP (ref 3.5–5.3)
PROT SERPL-MCNC: 6.8 G/DL — SIGNIFICANT CHANGE UP (ref 6–8.3)
PROTHROM AB SERPL-ACNC: 11.7 SEC — SIGNIFICANT CHANGE UP (ref 9.8–12.7)
RBC # BLD: 5.14 M/UL — SIGNIFICANT CHANGE UP (ref 4.2–5.8)
RBC # FLD: 13.1 % — SIGNIFICANT CHANGE UP (ref 10.3–14.5)
SODIUM SERPL-SCNC: 142 MMOL/L — SIGNIFICANT CHANGE UP (ref 135–145)
TACROLIMUS SERPL-MCNC: 4.7 NG/ML — SIGNIFICANT CHANGE UP
WBC # BLD: 5.1 K/UL — SIGNIFICANT CHANGE UP (ref 3.8–10.5)
WBC # FLD AUTO: 5.1 K/UL — SIGNIFICANT CHANGE UP (ref 3.8–10.5)

## 2017-05-09 PROCEDURE — 99232 SBSQ HOSP IP/OBS MODERATE 35: CPT

## 2017-05-09 PROCEDURE — 86713 LEGIONELLA ANTIBODY: CPT

## 2017-05-09 PROCEDURE — 36600 WITHDRAWAL OF ARTERIAL BLOOD: CPT

## 2017-05-09 PROCEDURE — 87633 RESP VIRUS 12-25 TARGETS: CPT

## 2017-05-09 PROCEDURE — 84484 ASSAY OF TROPONIN QUANT: CPT

## 2017-05-09 PROCEDURE — 76776 US EXAM K TRANSPL W/DOPPLER: CPT

## 2017-05-09 PROCEDURE — 99232 SBSQ HOSP IP/OBS MODERATE 35: CPT | Mod: GC

## 2017-05-09 PROCEDURE — 92610 EVALUATE SWALLOWING FUNCTION: CPT

## 2017-05-09 PROCEDURE — 97162 PT EVAL MOD COMPLEX 30 MIN: CPT

## 2017-05-09 PROCEDURE — 87581 M.PNEUMON DNA AMP PROBE: CPT

## 2017-05-09 PROCEDURE — 82009 KETONE BODYS QUAL: CPT

## 2017-05-09 PROCEDURE — 84295 ASSAY OF SERUM SODIUM: CPT

## 2017-05-09 PROCEDURE — 94003 VENT MGMT INPAT SUBQ DAY: CPT

## 2017-05-09 PROCEDURE — 82550 ASSAY OF CK (CPK): CPT

## 2017-05-09 PROCEDURE — 80197 ASSAY OF TACROLIMUS: CPT

## 2017-05-09 PROCEDURE — 81001 URINALYSIS AUTO W/SCOPE: CPT

## 2017-05-09 PROCEDURE — 82435 ASSAY OF BLOOD CHLORIDE: CPT

## 2017-05-09 PROCEDURE — 83036 HEMOGLOBIN GLYCOSYLATED A1C: CPT

## 2017-05-09 PROCEDURE — 97165 OT EVAL LOW COMPLEX 30 MIN: CPT

## 2017-05-09 PROCEDURE — 96372 THER/PROPH/DIAG INJ SC/IM: CPT | Mod: XU

## 2017-05-09 PROCEDURE — 85730 THROMBOPLASTIN TIME PARTIAL: CPT

## 2017-05-09 PROCEDURE — 82565 ASSAY OF CREATININE: CPT

## 2017-05-09 PROCEDURE — 97116 GAIT TRAINING THERAPY: CPT

## 2017-05-09 PROCEDURE — 86850 RBC ANTIBODY SCREEN: CPT

## 2017-05-09 PROCEDURE — 85027 COMPLETE CBC AUTOMATED: CPT

## 2017-05-09 PROCEDURE — 94660 CPAP INITIATION&MGMT: CPT

## 2017-05-09 PROCEDURE — 86900 BLOOD TYPING SEROLOGIC ABO: CPT

## 2017-05-09 PROCEDURE — 84132 ASSAY OF SERUM POTASSIUM: CPT

## 2017-05-09 PROCEDURE — 96374 THER/PROPH/DIAG INJ IV PUSH: CPT | Mod: XU

## 2017-05-09 PROCEDURE — 83880 ASSAY OF NATRIURETIC PEPTIDE: CPT

## 2017-05-09 PROCEDURE — 83605 ASSAY OF LACTIC ACID: CPT

## 2017-05-09 PROCEDURE — 93005 ELECTROCARDIOGRAM TRACING: CPT | Mod: XU

## 2017-05-09 PROCEDURE — 97110 THERAPEUTIC EXERCISES: CPT

## 2017-05-09 PROCEDURE — 85014 HEMATOCRIT: CPT

## 2017-05-09 PROCEDURE — 87086 URINE CULTURE/COLONY COUNT: CPT

## 2017-05-09 PROCEDURE — 71250 CT THORAX DX C-: CPT

## 2017-05-09 PROCEDURE — 83615 LACTATE (LD) (LDH) ENZYME: CPT

## 2017-05-09 PROCEDURE — 94640 AIRWAY INHALATION TREATMENT: CPT

## 2017-05-09 PROCEDURE — 86901 BLOOD TYPING SEROLOGIC RH(D): CPT

## 2017-05-09 PROCEDURE — 97112 NEUROMUSCULAR REEDUCATION: CPT

## 2017-05-09 PROCEDURE — 87103 BLOOD FUNGUS CULTURE: CPT

## 2017-05-09 PROCEDURE — 84100 ASSAY OF PHOSPHORUS: CPT

## 2017-05-09 PROCEDURE — 94002 VENT MGMT INPAT INIT DAY: CPT

## 2017-05-09 PROCEDURE — 31500 INSERT EMERGENCY AIRWAY: CPT

## 2017-05-09 PROCEDURE — 82803 BLOOD GASES ANY COMBINATION: CPT

## 2017-05-09 PROCEDURE — 93306 TTE W/DOPPLER COMPLETE: CPT

## 2017-05-09 PROCEDURE — 93970 EXTREMITY STUDY: CPT

## 2017-05-09 PROCEDURE — 82553 CREATINE MB FRACTION: CPT

## 2017-05-09 PROCEDURE — 96375 TX/PRO/DX INJ NEW DRUG ADDON: CPT | Mod: XU

## 2017-05-09 PROCEDURE — 71045 X-RAY EXAM CHEST 1 VIEW: CPT

## 2017-05-09 PROCEDURE — 80048 BASIC METABOLIC PNL TOTAL CA: CPT

## 2017-05-09 PROCEDURE — 82947 ASSAY GLUCOSE BLOOD QUANT: CPT

## 2017-05-09 PROCEDURE — 97530 THERAPEUTIC ACTIVITIES: CPT

## 2017-05-09 PROCEDURE — 85610 PROTHROMBIN TIME: CPT

## 2017-05-09 PROCEDURE — 94664 DEMO&/EVAL PT USE INHALER: CPT

## 2017-05-09 PROCEDURE — 82330 ASSAY OF CALCIUM: CPT

## 2017-05-09 PROCEDURE — 83735 ASSAY OF MAGNESIUM: CPT

## 2017-05-09 PROCEDURE — 88112 CYTOPATH CELL ENHANCE TECH: CPT

## 2017-05-09 PROCEDURE — 87798 DETECT AGENT NOS DNA AMP: CPT

## 2017-05-09 PROCEDURE — C8929: CPT

## 2017-05-09 PROCEDURE — 87040 BLOOD CULTURE FOR BACTERIA: CPT

## 2017-05-09 PROCEDURE — 99291 CRITICAL CARE FIRST HOUR: CPT | Mod: 25

## 2017-05-09 PROCEDURE — 80053 COMPREHEN METABOLIC PANEL: CPT

## 2017-05-09 PROCEDURE — 87486 CHLMYD PNEUM DNA AMP PROBE: CPT

## 2017-05-09 PROCEDURE — 80202 ASSAY OF VANCOMYCIN: CPT

## 2017-05-09 RX ORDER — APIXABAN 2.5 MG/1
10 TABLET, FILM COATED ORAL
Qty: 0 | Refills: 0 | Status: DISCONTINUED | OUTPATIENT
Start: 2017-05-09 | End: 2017-05-09

## 2017-05-09 RX ORDER — INSULIN GLARGINE 100 [IU]/ML
25 INJECTION, SOLUTION SUBCUTANEOUS
Qty: 1 | Refills: 0
Start: 2017-05-09 | End: 2017-06-08

## 2017-05-09 RX ORDER — SENNA PLUS 8.6 MG/1
5 TABLET ORAL
Qty: 0 | Refills: 0 | DISCHARGE
Start: 2017-05-09

## 2017-05-09 RX ORDER — AZATHIOPRINE 100 MG/1
2 TABLET ORAL
Qty: 60 | Refills: 0
Start: 2017-05-09 | End: 2017-06-08

## 2017-05-09 RX ORDER — HYDRALAZINE HCL 50 MG
1 TABLET ORAL
Qty: 90 | Refills: 0
Start: 2017-05-09 | End: 2017-06-08

## 2017-05-09 RX ORDER — FAMOTIDINE 10 MG/ML
1 INJECTION INTRAVENOUS
Qty: 60 | Refills: 0
Start: 2017-05-09 | End: 2017-06-08

## 2017-05-09 RX ORDER — TACROLIMUS 5 MG/1
2 CAPSULE ORAL
Qty: 120 | Refills: 0
Start: 2017-05-09 | End: 2017-06-08

## 2017-05-09 RX ORDER — AZATHIOPRINE 100 MG/1
100 TABLET ORAL DAILY
Qty: 0 | Refills: 0 | Status: DISCONTINUED | OUTPATIENT
Start: 2017-05-09 | End: 2017-05-09

## 2017-05-09 RX ORDER — POLYETHYLENE GLYCOL 3350 17 G/17G
17 POWDER, FOR SOLUTION ORAL
Qty: 0 | Refills: 0 | DISCHARGE
Start: 2017-05-09

## 2017-05-09 RX ORDER — IPRATROPIUM/ALBUTEROL SULFATE 18-103MCG
1 AEROSOL WITH ADAPTER (GRAM) INHALATION
Qty: 1 | Refills: 0
Start: 2017-05-09 | End: 2017-06-08

## 2017-05-09 RX ORDER — INSULIN LISPRO 100/ML
12 VIAL (ML) SUBCUTANEOUS
Qty: 1 | Refills: 0
Start: 2017-05-09 | End: 2017-06-08

## 2017-05-09 RX ORDER — INSULIN LISPRO 100/ML
15 VIAL (ML) SUBCUTANEOUS
Qty: 0 | Refills: 0 | Status: DISCONTINUED | OUTPATIENT
Start: 2017-05-09 | End: 2017-05-09

## 2017-05-09 RX ORDER — ATORVASTATIN CALCIUM 80 MG/1
1 TABLET, FILM COATED ORAL
Qty: 30 | Refills: 0
Start: 2017-05-09 | End: 2017-06-08

## 2017-05-09 RX ORDER — ASPIRIN/CALCIUM CARB/MAGNESIUM 324 MG
1 TABLET ORAL
Qty: 0 | Refills: 0 | DISCHARGE
Start: 2017-05-09

## 2017-05-09 RX ORDER — APIXABAN 2.5 MG/1
2 TABLET, FILM COATED ORAL
Qty: 28 | Refills: 0
Start: 2017-05-09 | End: 2017-05-16

## 2017-05-09 RX ORDER — INSULIN GLARGINE 100 [IU]/ML
25 INJECTION, SOLUTION SUBCUTANEOUS
Qty: 1 | Refills: 0 | OUTPATIENT
Start: 2017-05-09 | End: 2017-06-08

## 2017-05-09 RX ORDER — APIXABAN 2.5 MG/1
1 TABLET, FILM COATED ORAL
Qty: 0 | Refills: 0 | DISCHARGE
Start: 2017-05-09

## 2017-05-09 RX ORDER — FAMOTIDINE 10 MG/ML
1 INJECTION INTRAVENOUS
Qty: 0 | Refills: 0 | COMMUNITY

## 2017-05-09 RX ORDER — INSULIN LISPRO 100/ML
12 VIAL (ML) SUBCUTANEOUS
Qty: 1 | Refills: 0 | OUTPATIENT
Start: 2017-05-09 | End: 2017-06-08

## 2017-05-09 RX ORDER — APIXABAN 2.5 MG/1
1 TABLET, FILM COATED ORAL
Qty: 180 | Refills: 0 | OUTPATIENT
Start: 2017-05-09 | End: 2017-08-07

## 2017-05-09 RX ORDER — HYDRALAZINE HCL 50 MG
1 TABLET ORAL
Qty: 42 | Refills: 0 | OUTPATIENT
Start: 2017-05-09 | End: 2017-05-23

## 2017-05-09 RX ORDER — APIXABAN 2.5 MG/1
1 TABLET, FILM COATED ORAL
Qty: 28 | Refills: 0 | OUTPATIENT
Start: 2017-05-09 | End: 2017-05-23

## 2017-05-09 RX ORDER — METOPROLOL TARTRATE 50 MG
1 TABLET ORAL
Qty: 60 | Refills: 0
Start: 2017-05-09 | End: 2017-06-08

## 2017-05-09 RX ADMIN — Medication 15 UNIT(S): at 13:33

## 2017-05-09 RX ADMIN — SODIUM CHLORIDE 3 MILLILITER(S): 9 INJECTION INTRAMUSCULAR; INTRAVENOUS; SUBCUTANEOUS at 12:14

## 2017-05-09 RX ADMIN — APIXABAN 10 MILLIGRAM(S): 2.5 TABLET, FILM COATED ORAL at 17:17

## 2017-05-09 RX ADMIN — Medication 75 MILLIGRAM(S): at 06:00

## 2017-05-09 RX ADMIN — APIXABAN 5 MILLIGRAM(S): 2.5 TABLET, FILM COATED ORAL at 06:05

## 2017-05-09 RX ADMIN — Medication 0.1 MILLIGRAM(S): at 17:16

## 2017-05-09 RX ADMIN — Medication 3 MILLILITER(S): at 11:59

## 2017-05-09 RX ADMIN — TACROLIMUS 2 MILLIGRAM(S): 5 CAPSULE ORAL at 17:16

## 2017-05-09 RX ADMIN — Medication 75 MILLIGRAM(S): at 17:17

## 2017-05-09 RX ADMIN — SODIUM CHLORIDE 3 MILLILITER(S): 9 INJECTION INTRAMUSCULAR; INTRAVENOUS; SUBCUTANEOUS at 07:48

## 2017-05-09 RX ADMIN — AZATHIOPRINE 100 MILLIGRAM(S): 100 TABLET ORAL at 12:10

## 2017-05-09 RX ADMIN — Medication 15 UNIT(S): at 08:33

## 2017-05-09 RX ADMIN — Medication 3 MILLILITER(S): at 05:24

## 2017-05-09 RX ADMIN — Medication 81 MILLIGRAM(S): at 12:10

## 2017-05-09 RX ADMIN — PANTOPRAZOLE SODIUM 40 MILLIGRAM(S): 20 TABLET, DELAYED RELEASE ORAL at 12:11

## 2017-05-09 RX ADMIN — INSULIN GLARGINE 10 UNIT(S): 100 INJECTION, SOLUTION SUBCUTANEOUS at 08:33

## 2017-05-09 RX ADMIN — TACROLIMUS 2 MILLIGRAM(S): 5 CAPSULE ORAL at 06:00

## 2017-05-09 RX ADMIN — Medication 25 MILLIGRAM(S): at 13:32

## 2017-05-09 RX ADMIN — Medication 1 TABLET(S): at 12:10

## 2017-05-09 RX ADMIN — Medication 2: at 13:32

## 2017-05-09 RX ADMIN — Medication 25 MILLIGRAM(S): at 06:01

## 2017-05-09 RX ADMIN — Medication 0.1 MILLIGRAM(S): at 06:01

## 2017-05-09 NOTE — PROVIDER CONTACT NOTE (OTHER) - DATE AND TIME:
01-May-2017 22:20
01-May-2017 23:10
02-May-2017 06:00
09-May-2017 14:30
28-Apr-2017 21:30
29-Apr-2017 06:04
09-May-2017 17:45

## 2017-05-09 NOTE — PROVIDER CONTACT NOTE (OTHER) - ACTION/TREATMENT ORDERED:
hold pre meal dinner insulin, pt going home on reduced insulin, ok to discharge
As per BOYD Solis, pt to receive Lantus 25 units in the morning and 25 units at bedtime upon discharge. Pt to be discharged with 12 units of humalog before meals.
Administer Norvasc and Hydralazine. Move clonidine and lopressor to 10am. Repeat BP in one hour. No further change in treatment.
no further informetion
no interventions at this time. awaiting for labs.
Hydralazine ordered TID give STAT dose now and repeat BP in one hour after administration. No further change in treatment.
continue heparin drip as per Dr. Miranda orders

## 2017-05-09 NOTE — PROVIDER CONTACT NOTE (OTHER) - SITUATION
FS 67, repeat 68
Hematuria pt. on Heparin drip. APTT at goal
PT has elevated /71
Pt was on 10 units of Lantus in the morning and 40 units of Lantus at bedtime and pt was receiving 15units of humalog before meals while in the hospital. Pt is being discharged on different dosages.
pt. c/o of chills and feeling cold
small amount bright red blood

## 2017-05-22 ENCOUNTER — LABORATORY RESULT (OUTPATIENT)
Age: 52
End: 2017-05-22

## 2017-05-22 ENCOUNTER — APPOINTMENT (OUTPATIENT)
Dept: NEPHROLOGY | Facility: CLINIC | Age: 52
End: 2017-05-22

## 2017-05-22 VITALS
HEIGHT: 73 IN | HEART RATE: 66 BPM | RESPIRATION RATE: 16 BRPM | SYSTOLIC BLOOD PRESSURE: 157 MMHG | TEMPERATURE: 97.4 F | OXYGEN SATURATION: 97 % | BODY MASS INDEX: 40.02 KG/M2 | DIASTOLIC BLOOD PRESSURE: 90 MMHG | WEIGHT: 302 LBS

## 2017-05-22 RX ORDER — INSULIN GLARGINE 100 [IU]/ML
100 INJECTION, SOLUTION SUBCUTANEOUS
Refills: 0 | Status: DISCONTINUED | COMMUNITY
Start: 2016-12-14 | End: 2017-05-22

## 2017-05-23 ENCOUNTER — APPOINTMENT (OUTPATIENT)
Dept: PULMONOLOGY | Facility: CLINIC | Age: 52
End: 2017-05-23

## 2017-05-24 LAB
CULTURE RESULTS: SIGNIFICANT CHANGE UP
SPECIMEN SOURCE: SIGNIFICANT CHANGE UP

## 2017-05-25 ENCOUNTER — APPOINTMENT (OUTPATIENT)
Dept: PULMONOLOGY | Facility: CLINIC | Age: 52
End: 2017-05-25

## 2017-06-06 ENCOUNTER — OUTPATIENT (OUTPATIENT)
Dept: OUTPATIENT SERVICES | Facility: HOSPITAL | Age: 52
LOS: 1 days | End: 2017-06-06
Payer: MEDICARE

## 2017-06-06 ENCOUNTER — APPOINTMENT (OUTPATIENT)
Dept: UROLOGY | Facility: CLINIC | Age: 52
End: 2017-06-06

## 2017-06-06 VITALS
DIASTOLIC BLOOD PRESSURE: 96 MMHG | SYSTOLIC BLOOD PRESSURE: 157 MMHG | HEIGHT: 73 IN | BODY MASS INDEX: 38.43 KG/M2 | HEART RATE: 66 BPM | WEIGHT: 290 LBS | RESPIRATION RATE: 15 BRPM

## 2017-06-06 DIAGNOSIS — N18.6 END STAGE RENAL DISEASE: Chronic | ICD-10-CM

## 2017-06-06 PROCEDURE — 52000 CYSTOURETHROSCOPY: CPT

## 2017-06-07 LAB
APPEARANCE: CLEAR
BACTERIA: NEGATIVE
BILIRUBIN URINE: NEGATIVE
BLOOD URINE: ABNORMAL
COLOR: YELLOW
CORE LAB FLUID CYTOLOGY: NORMAL
GLUCOSE QUALITATIVE U: NORMAL MG/DL
HYALINE CASTS: 0 /LPF
KETONES URINE: NEGATIVE
LEUKOCYTE ESTERASE URINE: NEGATIVE
MICROSCOPIC-UA: NORMAL
NITRITE URINE: NEGATIVE
PH URINE: 7
PROTEIN URINE: ABNORMAL MG/DL
RED BLOOD CELLS URINE: 16 /HPF
SPECIFIC GRAVITY URINE: 1.02
SQUAMOUS EPITHELIAL CELLS: 0 /HPF
UROBILINOGEN URINE: 1 MG/DL
WHITE BLOOD CELLS URINE: 0 /HPF

## 2017-06-09 DIAGNOSIS — R31.0 GROSS HEMATURIA: ICD-10-CM

## 2017-06-09 DIAGNOSIS — Z94.0 KIDNEY TRANSPLANT STATUS: ICD-10-CM

## 2017-06-19 ENCOUNTER — APPOINTMENT (OUTPATIENT)
Dept: NEPHROLOGY | Facility: CLINIC | Age: 52
End: 2017-06-19

## 2017-06-19 VITALS
HEART RATE: 72 BPM | BODY MASS INDEX: 40.29 KG/M2 | WEIGHT: 304 LBS | DIASTOLIC BLOOD PRESSURE: 69 MMHG | RESPIRATION RATE: 16 BRPM | OXYGEN SATURATION: 93 % | SYSTOLIC BLOOD PRESSURE: 159 MMHG | HEIGHT: 73 IN | TEMPERATURE: 97.4 F

## 2017-07-03 ENCOUNTER — APPOINTMENT (OUTPATIENT)
Dept: TRANSPLANT | Facility: CLINIC | Age: 52
End: 2017-07-03

## 2017-07-05 ENCOUNTER — APPOINTMENT (OUTPATIENT)
Dept: TRANSPLANT | Facility: CLINIC | Age: 52
End: 2017-07-05

## 2017-07-11 ENCOUNTER — APPOINTMENT (OUTPATIENT)
Dept: NEPHROLOGY | Facility: CLINIC | Age: 52
End: 2017-07-11

## 2017-07-27 ENCOUNTER — OUTPATIENT (OUTPATIENT)
Dept: OUTPATIENT SERVICES | Facility: HOSPITAL | Age: 52
LOS: 1 days | End: 2017-07-27
Payer: MEDICARE

## 2017-07-27 ENCOUNTER — APPOINTMENT (OUTPATIENT)
Dept: CV DIAGNOSITCS | Facility: HOSPITAL | Age: 52
End: 2017-07-27

## 2017-07-27 DIAGNOSIS — N18.6 END STAGE RENAL DISEASE: Chronic | ICD-10-CM

## 2017-07-27 DIAGNOSIS — I25.10 ATHEROSCLEROTIC HEART DISEASE OF NATIVE CORONARY ARTERY WITHOUT ANGINA PECTORIS: ICD-10-CM

## 2017-07-27 PROCEDURE — C8929: CPT

## 2017-07-27 PROCEDURE — 93306 TTE W/DOPPLER COMPLETE: CPT | Mod: 26

## 2017-09-11 ENCOUNTER — APPOINTMENT (OUTPATIENT)
Dept: NEPHROLOGY | Facility: CLINIC | Age: 52
End: 2017-09-11

## 2017-09-11 ENCOUNTER — APPOINTMENT (OUTPATIENT)
Dept: NEPHROLOGY | Facility: CLINIC | Age: 52
End: 2017-09-11
Payer: MEDICARE

## 2017-09-11 VITALS
BODY MASS INDEX: 40.29 KG/M2 | WEIGHT: 304 LBS | HEART RATE: 63 BPM | OXYGEN SATURATION: 96 % | HEIGHT: 73 IN | SYSTOLIC BLOOD PRESSURE: 170 MMHG | RESPIRATION RATE: 16 BRPM | TEMPERATURE: 97.4 F | DIASTOLIC BLOOD PRESSURE: 89 MMHG

## 2017-09-11 PROCEDURE — 99214 OFFICE O/P EST MOD 30 MIN: CPT

## 2017-10-20 ENCOUNTER — RX RENEWAL (OUTPATIENT)
Age: 52
End: 2017-10-20

## 2017-10-24 ENCOUNTER — RX RENEWAL (OUTPATIENT)
Age: 52
End: 2017-10-24

## 2017-10-25 ENCOUNTER — MEDICATION RENEWAL (OUTPATIENT)
Age: 52
End: 2017-10-25

## 2017-12-18 ENCOUNTER — RX RENEWAL (OUTPATIENT)
Age: 52
End: 2017-12-18

## 2018-01-08 ENCOUNTER — APPOINTMENT (OUTPATIENT)
Dept: NEPHROLOGY | Facility: CLINIC | Age: 53
End: 2018-01-08
Payer: MEDICARE

## 2018-01-08 VITALS
OXYGEN SATURATION: 96 % | RESPIRATION RATE: 16 BRPM | DIASTOLIC BLOOD PRESSURE: 85 MMHG | HEIGHT: 73 IN | WEIGHT: 309 LBS | BODY MASS INDEX: 40.95 KG/M2 | TEMPERATURE: 98.2 F | SYSTOLIC BLOOD PRESSURE: 162 MMHG | HEART RATE: 65 BPM

## 2018-01-08 PROCEDURE — 99214 OFFICE O/P EST MOD 30 MIN: CPT

## 2018-01-09 LAB
ALBUMIN SERPL ELPH-MCNC: 4 G/DL
ALP BLD-CCNC: 92 U/L
ALT SERPL-CCNC: 14 U/L
ANION GAP SERPL CALC-SCNC: 13 MMOL/L
APPEARANCE: CLEAR
AST SERPL-CCNC: 17 U/L
BASOPHILS # BLD AUTO: 0.03 K/UL
BASOPHILS NFR BLD AUTO: 0.4 %
BILIRUB SERPL-MCNC: 0.4 MG/DL
BILIRUBIN URINE: NEGATIVE
BLOOD URINE: NEGATIVE
BUN SERPL-MCNC: 19 MG/DL
CALCIUM SERPL-MCNC: 9.8 MG/DL
CHLORIDE SERPL-SCNC: 96 MMOL/L
CHOLEST SERPL-MCNC: 146 MG/DL
CHOLEST/HDLC SERPL: 3.7 RATIO
CO2 SERPL-SCNC: 30 MMOL/L
COLOR: YELLOW
CREAT SERPL-MCNC: 1.26 MG/DL
CREAT SPEC-SCNC: 106 MG/DL
CREAT/PROT UR: 0.1 RATIO
EOSINOPHIL # BLD AUTO: 0.41 K/UL
EOSINOPHIL NFR BLD AUTO: 5.5 %
GLUCOSE QUALITATIVE U: 1000 MG/DL
GLUCOSE SERPL-MCNC: 255 MG/DL
HBA1C MFR BLD HPLC: 11 %
HCT VFR BLD CALC: 44.5 %
HDLC SERPL-MCNC: 40 MG/DL
HGB BLD-MCNC: 14.7 G/DL
IMM GRANULOCYTES NFR BLD AUTO: 0.1 %
KETONES URINE: NEGATIVE
LDH SERPL-CCNC: 168 U/L
LDLC SERPL CALC-MCNC: 88 MG/DL
LEUKOCYTE ESTERASE URINE: NEGATIVE
LYMPHOCYTES # BLD AUTO: 1.28 K/UL
LYMPHOCYTES NFR BLD AUTO: 17 %
MAGNESIUM SERPL-MCNC: 1.7 MG/DL
MAN DIFF?: NORMAL
MCHC RBC-ENTMCNC: 30.7 PG
MCHC RBC-ENTMCNC: 33 GM/DL
MCV RBC AUTO: 92.9 FL
MONOCYTES # BLD AUTO: 0.46 K/UL
MONOCYTES NFR BLD AUTO: 6.1 %
NEUTROPHILS # BLD AUTO: 5.32 K/UL
NEUTROPHILS NFR BLD AUTO: 70.9 %
NITRITE URINE: NEGATIVE
PH URINE: 5.5
PHOSPHATE SERPL-MCNC: 2.7 MG/DL
PLATELET # BLD AUTO: 208 K/UL
POTASSIUM SERPL-SCNC: 4.7 MMOL/L
PROT SERPL-MCNC: 7.3 G/DL
PROT UR-MCNC: 11 MG/DL
PROTEIN URINE: NEGATIVE MG/DL
RBC # BLD: 4.79 M/UL
RBC # FLD: 13.9 %
SODIUM SERPL-SCNC: 139 MMOL/L
SPECIFIC GRAVITY URINE: 1.03
TACROLIMUS SERPL-MCNC: 7.1 NG/ML
TRIGL SERPL-MCNC: 91 MG/DL
URATE SERPL-MCNC: 4.8 MG/DL
UROBILINOGEN URINE: 1 MG/DL
WBC # FLD AUTO: 7.51 K/UL

## 2018-01-30 LAB — BKV DNA SPEC QL NAA+PROBE: NORMAL

## 2018-06-29 ENCOUNTER — MEDICATION RENEWAL (OUTPATIENT)
Age: 53
End: 2018-06-29

## 2018-07-12 ENCOUNTER — APPOINTMENT (OUTPATIENT)
Dept: NEPHROLOGY | Facility: CLINIC | Age: 53
End: 2018-07-12
Payer: MEDICARE

## 2018-07-12 VITALS
HEART RATE: 68 BPM | BODY MASS INDEX: 41.69 KG/M2 | RESPIRATION RATE: 14 BRPM | DIASTOLIC BLOOD PRESSURE: 88 MMHG | TEMPERATURE: 98 F | SYSTOLIC BLOOD PRESSURE: 144 MMHG | WEIGHT: 315 LBS

## 2018-07-12 LAB
APPEARANCE: CLEAR
BACTERIA: NEGATIVE
BILIRUBIN URINE: NEGATIVE
BLOOD URINE: NEGATIVE
COLOR: YELLOW
GLUCOSE QUALITATIVE U: NEGATIVE MG/DL
KETONES URINE: NEGATIVE
LEUKOCYTE ESTERASE URINE: NEGATIVE
MICROSCOPIC-UA: NORMAL
NITRITE URINE: NEGATIVE
PH URINE: 6.5
PROTEIN URINE: NEGATIVE MG/DL
RED BLOOD CELLS URINE: 6 /HPF
SPECIFIC GRAVITY URINE: 1.02
SQUAMOUS EPITHELIAL CELLS: 0 /HPF
UROBILINOGEN URINE: 1 MG/DL
WHITE BLOOD CELLS URINE: 0 /HPF

## 2018-07-12 PROCEDURE — 99214 OFFICE O/P EST MOD 30 MIN: CPT

## 2018-07-13 LAB
ALBUMIN SERPL ELPH-MCNC: 3.9 G/DL
ALP BLD-CCNC: 93 U/L
ALT SERPL-CCNC: 13 U/L
ANION GAP SERPL CALC-SCNC: 14 MMOL/L
AST SERPL-CCNC: 24 U/L
BASOPHILS # BLD AUTO: 0.04 K/UL
BASOPHILS NFR BLD AUTO: 0.6 %
BILIRUB SERPL-MCNC: 0.5 MG/DL
BUN SERPL-MCNC: 18 MG/DL
CALCIUM SERPL-MCNC: 9.5 MG/DL
CHLORIDE SERPL-SCNC: 100 MMOL/L
CO2 SERPL-SCNC: 27 MMOL/L
CREAT SERPL-MCNC: 1.15 MG/DL
CREAT SPEC-SCNC: 96 MG/DL
CREAT/PROT UR: 0.2 RATIO
EOSINOPHIL # BLD AUTO: 0.32 K/UL
EOSINOPHIL NFR BLD AUTO: 5.1 %
GLUCOSE SERPL-MCNC: 132 MG/DL
HCT VFR BLD CALC: 43.3 %
HGB BLD-MCNC: 13.8 G/DL
IMM GRANULOCYTES NFR BLD AUTO: 0.2 %
LDH SERPL-CCNC: 181 U/L
LYMPHOCYTES # BLD AUTO: 1.02 K/UL
LYMPHOCYTES NFR BLD AUTO: 16.2 %
MAGNESIUM SERPL-MCNC: 1.8 MG/DL
MAN DIFF?: NORMAL
MCHC RBC-ENTMCNC: 29.5 PG
MCHC RBC-ENTMCNC: 31.9 GM/DL
MCV RBC AUTO: 92.5 FL
MONOCYTES # BLD AUTO: 0.45 K/UL
MONOCYTES NFR BLD AUTO: 7.1 %
NEUTROPHILS # BLD AUTO: 4.46 K/UL
NEUTROPHILS NFR BLD AUTO: 70.8 %
PHOSPHATE SERPL-MCNC: 2.9 MG/DL
PLATELET # BLD AUTO: 189 K/UL
POTASSIUM SERPL-SCNC: 4.4 MMOL/L
PROT SERPL-MCNC: 7.2 G/DL
PROT UR-MCNC: 17 MG/DL
RBC # BLD: 4.68 M/UL
RBC # FLD: 14.6 %
SODIUM SERPL-SCNC: 141 MMOL/L
TACROLIMUS SERPL-MCNC: 5.4 NG/ML
URATE SERPL-MCNC: 5.4 MG/DL
WBC # FLD AUTO: 6.3 K/UL

## 2018-07-16 LAB — CMV DNA SPEC QL NAA+PROBE: NOT DETECTED IU/ML

## 2018-07-17 LAB — BKV DNA SPEC QL NAA+PROBE: NORMAL

## 2018-07-30 ENCOUNTER — RX RENEWAL (OUTPATIENT)
Age: 53
End: 2018-07-30

## 2018-10-04 ENCOUNTER — RX RENEWAL (OUTPATIENT)
Age: 53
End: 2018-10-04

## 2018-10-15 ENCOUNTER — APPOINTMENT (OUTPATIENT)
Dept: NEPHROLOGY | Facility: CLINIC | Age: 53
End: 2018-10-15
Payer: MEDICARE

## 2018-10-15 VITALS — DIASTOLIC BLOOD PRESSURE: 80 MMHG | SYSTOLIC BLOOD PRESSURE: 140 MMHG

## 2018-10-15 VITALS
BODY MASS INDEX: 42.26 KG/M2 | HEART RATE: 12 BPM | RESPIRATION RATE: 95 BRPM | OXYGEN SATURATION: 95 % | WEIGHT: 312 LBS | HEIGHT: 72 IN | DIASTOLIC BLOOD PRESSURE: 81 MMHG | TEMPERATURE: 98.1 F | SYSTOLIC BLOOD PRESSURE: 153 MMHG

## 2018-10-15 LAB
BASOPHILS # BLD AUTO: 0.05 K/UL
BASOPHILS NFR BLD AUTO: 0.7 %
EOSINOPHIL # BLD AUTO: 0.44 K/UL
EOSINOPHIL NFR BLD AUTO: 5.9 %
HCT VFR BLD CALC: 43.4 %
HGB BLD-MCNC: 13.8 G/DL
IMM GRANULOCYTES NFR BLD AUTO: 0.3 %
LYMPHOCYTES # BLD AUTO: 1.05 K/UL
LYMPHOCYTES NFR BLD AUTO: 14 %
MAN DIFF?: NORMAL
MCHC RBC-ENTMCNC: 29.3 PG
MCHC RBC-ENTMCNC: 31.8 GM/DL
MCV RBC AUTO: 92.1 FL
MONOCYTES # BLD AUTO: 0.64 K/UL
MONOCYTES NFR BLD AUTO: 8.6 %
NEUTROPHILS # BLD AUTO: 5.28 K/UL
NEUTROPHILS NFR BLD AUTO: 70.5 %
PLATELET # BLD AUTO: 217 K/UL
RBC # BLD: 4.71 M/UL
RBC # FLD: 14.3 %
WBC # FLD AUTO: 7.48 K/UL

## 2018-10-15 PROCEDURE — 99214 OFFICE O/P EST MOD 30 MIN: CPT

## 2018-10-15 RX ORDER — APIXABAN 5 MG/1
5 TABLET, FILM COATED ORAL
Qty: 180 | Refills: 0 | Status: DISCONTINUED | COMMUNITY
Start: 2017-05-22 | End: 2018-10-15

## 2018-10-15 RX ORDER — ASPIRIN 81 MG/1
81 TABLET, CHEWABLE ORAL
Qty: 90 | Refills: 0 | Status: DISCONTINUED | COMMUNITY
Start: 2017-05-22 | End: 2018-10-15

## 2019-02-21 LAB
ALBUMIN SERPL ELPH-MCNC: 4.1 G/DL
ALP BLD-CCNC: 101 U/L
ALT SERPL-CCNC: 13 U/L
ANION GAP SERPL CALC-SCNC: 13 MMOL/L
APPEARANCE: CLEAR
AST SERPL-CCNC: 22 U/L
BACTERIA: NEGATIVE
BILIRUB SERPL-MCNC: 0.4 MG/DL
BILIRUBIN URINE: NEGATIVE
BKV DNA SPEC QL NAA+PROBE: NORMAL
BLOOD URINE: NEGATIVE
BUN SERPL-MCNC: 18 MG/DL
CALCIUM SERPL-MCNC: 9.4 MG/DL
CHLORIDE SERPL-SCNC: 99 MMOL/L
CMV DNA SPEC QL NAA+PROBE: NOT DETECTED IU/ML
CMVPCR LOG: NOT DETECTED LOGIU/ML
CO2 SERPL-SCNC: 28 MMOL/L
COLOR: YELLOW
CREAT SERPL-MCNC: 1.09 MG/DL
CREAT SPEC-SCNC: 65 MG/DL
CREAT/PROT UR: 0.2 RATIO
GLUCOSE QUALITATIVE U: NEGATIVE MG/DL
GLUCOSE SERPL-MCNC: 104 MG/DL
HYALINE CASTS: 1 /LPF
KETONES URINE: NEGATIVE
LDH SERPL-CCNC: 200 U/L
LEUKOCYTE ESTERASE URINE: NEGATIVE
MAGNESIUM SERPL-MCNC: 1.7 MG/DL
MICROSCOPIC-UA: NORMAL
NITRITE URINE: NEGATIVE
PH URINE: 6
PHOSPHATE SERPL-MCNC: 3.7 MG/DL
POTASSIUM SERPL-SCNC: 4.3 MMOL/L
PROT SERPL-MCNC: 7.2 G/DL
PROT UR-MCNC: 14 MG/DL
PROTEIN URINE: NEGATIVE MG/DL
RED BLOOD CELLS URINE: 3 /HPF
SODIUM SERPL-SCNC: 140 MMOL/L
SPECIFIC GRAVITY URINE: 1.01
SQUAMOUS EPITHELIAL CELLS: 0 /HPF
TACROLIMUS SERPL-MCNC: 6.3 NG/ML
UROBILINOGEN URINE: 1 MG/DL
WHITE BLOOD CELLS URINE: 2 /HPF

## 2019-03-18 ENCOUNTER — RX RENEWAL (OUTPATIENT)
Age: 54
End: 2019-03-18

## 2019-04-15 ENCOUNTER — APPOINTMENT (OUTPATIENT)
Dept: NEPHROLOGY | Facility: CLINIC | Age: 54
End: 2019-04-15

## 2019-04-20 NOTE — H&P ADULT. - PROBLEM SELECTOR PROBLEM 1
[FreeTextEntry3] : All medical record entries made by the Scribe were at my, Dr. Sheets's direction and personally dictated by me on 04/11/2019 . I have reviewed the chart and agree that the record accurately reflects my personal performance of the history, physical exam, assessment and plan. I have also personally directed, reviewed, and agreed with the chart. Pneumonia

## 2019-06-03 ENCOUNTER — APPOINTMENT (OUTPATIENT)
Dept: NEPHROLOGY | Facility: CLINIC | Age: 54
End: 2019-06-03
Payer: MEDICARE

## 2019-06-03 VITALS
HEIGHT: 72 IN | SYSTOLIC BLOOD PRESSURE: 176 MMHG | BODY MASS INDEX: 39.82 KG/M2 | TEMPERATURE: 98.3 F | DIASTOLIC BLOOD PRESSURE: 83 MMHG | WEIGHT: 294 LBS | OXYGEN SATURATION: 95 %

## 2019-06-03 VITALS — SYSTOLIC BLOOD PRESSURE: 136 MMHG | DIASTOLIC BLOOD PRESSURE: 80 MMHG

## 2019-06-03 PROCEDURE — 99214 OFFICE O/P EST MOD 30 MIN: CPT

## 2019-06-03 RX ORDER — METOPROLOL TARTRATE 50 MG/1
50 TABLET, FILM COATED ORAL
Qty: 180 | Refills: 3 | Status: DISCONTINUED | COMMUNITY
Start: 2018-07-30 | End: 2019-06-03

## 2019-06-03 NOTE — REASON FOR VISIT
[Follow-Up] : a follow-up visit [FreeTextEntry1] :  Post renal transplant-no acute symptoms, has lower extremity swelling for over a year

## 2019-06-03 NOTE — ASSESSMENT
[FreeTextEntry1] : LURT:  Has chronic lower extremity edema,  Will check labs. Has not followed with Dr. Weber recently.\par \par Infectious Prophylaxis: On Bactrim. refilled. Flu vaccine and pneumonia vaccine advised.\par \par LE edema :Had sonogram already from primary MD in July 2018 no DVT. Discussed low sodium diet, will check urinalysis.\par \par Hypertension:Controlled. Continue current medications.\par \par DM:  On insulin pump.-Advised follow up with endocrinology. Dr. Garcia\par \par CVA -Oct 2015. Minimal residues including balance issues and occasional swallowing issues with solids.  Continues to see neurologist , exercise.\par \par Hyperlipidemia: On statin.\par \par Health maintenance - Seeing dermatology, was treated with topical cream for seborrhoic dermatitis. Follows up with Ophthalmology regularly. \par \par Discussed weight loss strategies, low calorie diet. Regular daily physical activity.\par \par He will f/u with primary nephrologist periodically Dr. Barker in 3-6 months. F/u at transplant center yearly.\par \par Discussed Renal Preservation strategies including achieving optimal weight through calory restriction and regular exercise, daily exercise, sleep hygiene, optimized control of glucose, blood pressure, lipids, avoidance of NSAIDs, Low Na and Low animal protein diet and medication adherence.\par \par \par \par \par

## 2019-06-03 NOTE — END OF VISIT
[>50% of Time Spent on Counseling for ____] : Greater than 50% of the encounter time was spent on counseling for [unfilled] [Time Spent: ___ minutes] : I have spent [unfilled] minutes of face to face time with the patient [FreeTextEntry1] : JENN Chi

## 2019-06-03 NOTE — HISTORY OF PRESENT ILLNESS
[FreeTextEntry1] : 52 y/o M s/p LURT 12/9/13. \par Has had LE edema right > left\par No SOB/fever/urinary symptoms.\par Still has left side heat/cold sensory loss. Not driving, balance is still not perfect. \par Still has difficulty swallowing solids, able to swallow with liquids\par Glucose level has been better, he is on insulin pump for glycemic control now.\par on f/u with Dr. Miranda- PCR\par Neurologist: Dr. Campbell\par Endocrinologist- Dr. Garcia\par

## 2019-06-03 NOTE — PHYSICAL EXAM
[General Appearance - Alert] : alert [General Appearance - Well Nourished] : well nourished [General Appearance - Well Developed] : well developed [Extraocular Movements] : extraocular movements were intact [Sclera] : the sclera and conjunctiva were normal [Hearing Threshold Finger Rub Not [place]] : hearing was normal [Oropharynx] : the oropharynx was normal [Neck Cervical Mass (___cm)] : no neck mass was observed [Jugular Venous Distention Increased] : there was no jugular-venous distention [Respiration, Rhythm And Depth] : normal respiratory rhythm and effort [Auscultation Breath Sounds / Voice Sounds] : lungs were clear to auscultation bilaterally [Heart Sounds Gallop] : no gallops [Heart Sounds] : normal S1 and S2 [Heart Sounds Pericardial Friction Rub] : no pericardial rub [Abdomen Soft] : soft [Abdomen Tenderness] : non-tender [Cervical Lymph Nodes Enlarged Anterior Bilaterally] : anterior cervical [Cervical Lymph Nodes Enlarged Posterior Bilaterally] : posterior cervical [Supraclavicular Lymph Nodes Enlarged Bilaterally] : supraclavicular [Axillary Lymph Nodes Enlarged Bilaterally] : axillary [Inguinal Lymph Nodes Enlarged Bilaterally] : inguinal [No CVA Tenderness] : no ~M costovertebral angle tenderness [No Spinal Tenderness] : no spinal tenderness [Motor Tone] : muscle strength and tone were normal [___ (cm) Fistula] : [unfilled] (cm) fistula [] : no rash [Oriented To Time, Place, And Person] : oriented to person, place, and time [Affect] : the affect was normal [Mood] : the mood was normal [FreeTextEntry1] : no tremor

## 2019-06-03 NOTE — REVIEW OF SYSTEMS
[As noted in HPI] : as noted in HPI [As Noted in HPI] : as noted in HPI [Lower Ext Edema] : lower extremity edema [SOB on Exertion] : shortness of breath during exertion [Negative] : Heme/Lymph [Fever] : no fever [Chills] : no chills [Cough] : no cough [FreeTextEntry4] : chronic rhinorrhea [FreeTextEntry6] : on home oxygen

## 2019-06-07 ENCOUNTER — OTHER (OUTPATIENT)
Age: 54
End: 2019-06-07

## 2019-06-07 LAB
25(OH)D3 SERPL-MCNC: 23.3 NG/ML
ALBUMIN SERPL ELPH-MCNC: 4.2 G/DL
ALP BLD-CCNC: 100 U/L
ALT SERPL-CCNC: 17 U/L
ANION GAP SERPL CALC-SCNC: 14 MMOL/L
APPEARANCE: CLEAR
AST SERPL-CCNC: 15 U/L
BACTERIA: NEGATIVE
BASOPHILS # BLD AUTO: 0.05 K/UL
BASOPHILS NFR BLD AUTO: 0.9 %
BILIRUB SERPL-MCNC: 0.4 MG/DL
BILIRUBIN URINE: NEGATIVE
BKV DNA SPEC QL NAA+PROBE: NOT DETECTED COPIES/ML
BLOOD URINE: NORMAL
BUN SERPL-MCNC: 24 MG/DL
CALCIUM SERPL-MCNC: 9.5 MG/DL
CHLORIDE SERPL-SCNC: 101 MMOL/L
CMV DNA SPEC QL NAA+PROBE: NOT DETECTED
CO2 SERPL-SCNC: 28 MMOL/L
COLOR: NORMAL
CREAT SERPL-MCNC: 1.19 MG/DL
CREAT SPEC-SCNC: 88 MG/DL
CREAT/PROT UR: 0.1 RATIO
EOSINOPHIL # BLD AUTO: 0.2 K/UL
EOSINOPHIL NFR BLD AUTO: 3.6 %
ESTIMATED AVERAGE GLUCOSE: 160 MG/DL
GLUCOSE QUALITATIVE U: NORMAL
GLUCOSE SERPL-MCNC: 125 MG/DL
HBA1C MFR BLD HPLC: 7.2 %
HCT VFR BLD CALC: 43 %
HGB BLD-MCNC: 13.7 G/DL
HYALINE CASTS: 0 /LPF
IMM GRANULOCYTES NFR BLD AUTO: 0.2 %
KETONES URINE: NEGATIVE
LDH SERPL-CCNC: 170 U/L
LEUKOCYTE ESTERASE URINE: NEGATIVE
LYMPHOCYTES # BLD AUTO: 0.88 K/UL
LYMPHOCYTES NFR BLD AUTO: 15.9 %
MAGNESIUM SERPL-MCNC: 1.7 MG/DL
MAN DIFF?: NORMAL
MCHC RBC-ENTMCNC: 29.3 PG
MCHC RBC-ENTMCNC: 31.9 GM/DL
MCV RBC AUTO: 91.9 FL
MICROSCOPIC-UA: NORMAL
MONOCYTES # BLD AUTO: 0.48 K/UL
MONOCYTES NFR BLD AUTO: 8.7 %
NEUTROPHILS # BLD AUTO: 3.9 K/UL
NEUTROPHILS NFR BLD AUTO: 70.7 %
NITRITE URINE: NEGATIVE
PH URINE: 6
PHOSPHATE SERPL-MCNC: 3.4 MG/DL
PLATELET # BLD AUTO: 212 K/UL
POTASSIUM SERPL-SCNC: 4.4 MMOL/L
PROT SERPL-MCNC: 6.8 G/DL
PROT UR-MCNC: 12 MG/DL
PROTEIN URINE: NEGATIVE
RBC # BLD: 4.68 M/UL
RBC # FLD: 13.6 %
RED BLOOD CELLS URINE: 2 /HPF
SODIUM SERPL-SCNC: 142 MMOL/L
SPECIFIC GRAVITY URINE: 1.02
SQUAMOUS EPITHELIAL CELLS: 0 /HPF
TACROLIMUS SERPL-MCNC: 3.9 NG/ML
URATE SERPL-MCNC: 5.2 MG/DL
UROBILINOGEN URINE: NORMAL
WBC # FLD AUTO: 5.52 K/UL
WHITE BLOOD CELLS URINE: 0 /HPF

## 2019-08-30 NOTE — ED PROVIDER NOTE - CARDIAC RHYTHM
Patient: Quiana Estevez MRN: [id number] FIN: [id number]   Age: 63 years Sex: FEMALE : 1954   Associated Diagnoses: None   Author: BOB ESPITIA       Upper endoscopy. Esophagoscopy, gastroscopy and duodenoscopy with distal esophageal and gastric biopsy/CLOtest.   Colonoscopy with random biopsies of right and left colon, cold forceps polypectomy of cecal polyp x1 and rectal polyp x2. Preoperative diagnosis:  1. Chronic GERD. 2. Abdominal Pain with Dyspepsia. 3. Change in Bowel Habits. 4. Colorectal Cancer Screening-Index Colonoscopy. 5. Irritable bowel syndrome. Postoperative diagnosis:  1. Possible obstructive sleep apnea with redundant oropharyngeal tissue. 2. Tortuous and redundant esophagus. 3. [place] Grade A Erosive Esophagitis. 4. 4.0 cm hiatal hernia. 5. Nonerosive gastritis. 6. Nonbleeding would in the bulb ulcer. 7. Otherwise normal esophagoscopy, gastroscopy and duodenoscopy. 1. Diminutive cecal polyp/nodule. 2. Diminutive rectal polyp x2.  3. Uncomplicated pan diverticulosis with haustral hypertrophy. 4. Tortuous and redundant colon with diffuse spasms. 5. Nonbleeding hemorrhoids. 6. Otherwise normal esophagoscopy, gastroscopy and duodenoscopy. Surgeon: Dr. Rhea Angelo. Assistant: None. Attending Physician: Dr. Trudi Steel. Operation:  Upper endoscopy: Vilbymgu-Eijszi-Vlrbgwhpuxlj with biopsy. Anesthesia:  ANESTHESIA : - Versed 7 mg. Fentanyl 100 micrograms. Hurricaine spray. Blood Loss:  None  Specimen(s):  1. Distal esophageal biopsy. 2. Gastric biopsy/CLOtest.  3. Cecal nodule/polyp biopsy. 4. Rectal polyps. 5. Random right and left colon biopsy. Complications:  None    Description of Procedure: The procedure risks, benefits, alternatives and complications such as perforation, hemorrhage, bleeding, adverse drug reaction, missed lesions, aspiration, need for hospitalization and possible surgery were discussed with the patient. All questions were answered to the best of my abilities. The patient understood the above, and the informed consent was freely signed. Based on the pre-procedure assessment including review of patient's medical history, medications, allergies and review of systems, the patient was deemed to be an appropriate candidate for conscious sedation; the patient was adequately sedated using Versed and fentanyl in gradual divided doses for both procedures. The patient was monitored continuously with EKG tracing, pulse oximetry, blood pressure monitoring, and direct observation. An Olympus video upper endoscope was introduced into the oral cavity and advanced into the esophagus, stomach and duodenum. The postbulbar duodenum, duodenal bulb and pylorus were normal with no evidence of mucosal abnormalities, bleeding or obstruction. The duodenal bulb showed a nonbleeding 1.5 cm ulcer with scarring and deformity. The gastric antrum, body, fundus, cardia, greater and lesser curvatures on both the direct and retroflexed views were unremarkable except for a small hiatal hernia and nonerosive gastritis. Biopsy the stomach were obtained for histology and CLOtest.. The endoscope was straightened and gradually withdrawn. The squamocolumnar junction was at 35 cm and the diaphragmatic hiatus was at 39 cm. The upper, middle and the remainder of the distal esophagus were normal. The squamocolumnar junction was slightly irregular with superficial erosions. Biopsies of the distal esophagus were obtained for histology. Rest of the esophagus, stomach and duodenum was otherwise unremarkable. The patient tolerated the procedure well. Vitals were stable throughout the procedure. There were no complications. Appropriate photo documentation was obtained. RECOMMENDATIONS:  1. The patient was advised about antireflux measures and diet. 2. Please avoid any non-steroidal anti-inflammatory drug or aspirin.   3. Pending biopsy report, further workup and treatment to follow. Patient advised to continue proton pump inhibiting agents. Colonoscopy with cold forceps polypectomy of cecal polyp x1 and rectal polyp x2 and random biopsies of right and left colon.:   Operation:  Colonscopy with cold forceps polypectomy and biopsy. Description of Procedure:  After the upper endoscopy the patient was turned around and a colonoscopy was performed. After adequate sedation, a digital rectal examination was first performed, which was unremarkable other than the presence of non-bleeding hemorrhoids. There were no fissures, fistula or masses. The anal sphincter tone was noted to be normal.   A lubricated Olympus variable pediatric videocolonoscope was introduced into the anal canal and advanced to the cecum and terminal ileum without any difficulty. The terminal ileum was entered and was noted to be normal. The cecum was identified by its usual anatomical landmarks and transillumination in the right lower quadrant. The cecum, ileocecal valve, ascending colon, hepatic flexure, transverse colon, splenic flexure, descending colon, sigmoid colon and rectum were carefully and meticulously examined as the scope was being withdrawn keeping the entire lumen in view. The entire colon showed pandiverticulosis with haustral hypertorphy with luminal narrowing more on the left than on the right wihout any diverticulitis, bleeding or obstruction. The cecum showed a diminutive polyp/nodule. Biopsy was not able obtained for histology. The rectum showed diminutive polyps x2. Cold forceps polypectomy was performed and sent for histology. Random biopsies of the and right and left colon were obtained for histology. The rest of the colonoscopy and ileoscopy was otherwise unremarkable. The entire colon showed a tortuous and redundant colon with diffuse spasms suggestive of irritable bowel syndrome. Retroflex is the rectum showed nonbleeding hemorrhoids.   The patient tolerated the procedure well. Vitals were stable throughout the procedure. There were no complications. The patient was taken to the recovery area in stable condition. Appropriate photo documentation was obtained. RECOMMENDATIONS:  1. The patient was advised about high fiber diet and toilet training. 2. Avoid any seeds and nuts. 3. The patient will require a follow-up colonoscopy in 5-10 years if asymptomatic and if symptomatic, workup and treatment as clinically indicated. Pending polyp biopsy report further workup, treatment and followup. 4. Information booklets where given on colon polyps, diverticulosis, hemorrhoids, irritable bowel syndrome, constipation and high fiber diet were given to the patient. 5. The patient will be followed by myself as an outpatient and by the patient's primary attending Dr. Miquel Haney. Thank you for asking me to participate in the care of Ms. Mesfin Hernandez. I shall follow this pleasant patient with you and keep you informed. Electronically Signed On 2013 08:30 PM  __________________________________________________   Afsaneh Fregoso     AP - SurgicalName: Mesfin Hernandez MRN: [id number]  : 1954 Visit#: [id number]    Surgical Pathology Report    Client: Nito    Additional Physician(s): Linda Rollins    Date Specimen Collected: 13 Accession #: [id number]  Date Specimen Received: 13  Date Reported: 2013 16:50 Location: [facility]    ______________________________________________________________________________  Pathologic Diagnosis :  A: Gastric:  - Fragments of gastric mucosa with mild vascular congestion and minimal  chronic inflammation.  - Immunostain negative for Helicobacter pylori. B: Esophagus:  - Fragments of squamous mucosa with features suggestive but not diagnostic for  reflux esophagitis. C: Cecal polyp/nodule:- Tubular adenoma.     D: Right and left colon:  - Fragments of colonic mucosa without significant microscopic abnormality. E: Rectal polyps:  - Hyperplastic polyps. Bessie Shipley M.D.  ** Electronic Signature () 5/28/2013 16:50 **  ______________________________________________________________________________    Clinical Information:  1- GERD, 2- abdominal pain/dyspepsia, 3- change in bowel movements, 4- IBS, 5-  colorectal screening.  EGD findings: 1- JUSTINA, 2- tortuous and redundant  esophagus, 3- [place] grade A erosive esophagitis, 4- hiatal hernia, 5- nonerosive  gastritis, nonbleeding, 6- duodenal bulb ulcer, 7- otherwise normal exam.  Colonoscopy findings: 1- Diminutive cecal polyp x1, 2- diminutive polyps x2,  3- uncomplicated diverticulosis, haustral hypertrophy, 4- nonbleeding  hemorrhoids, 5- otherwise normal exam, 6- tortuous and redundant colon with  diffuse spasms    Specimen(s) Submitted:  A: Gastric  B: Esophagus  C: Cecal polyp/ nodule  D: Right and left colon  E: Rectal polyps regular

## 2019-09-14 ENCOUNTER — EMERGENCY (EMERGENCY)
Facility: HOSPITAL | Age: 54
LOS: 1 days | Discharge: HOME | End: 2019-09-14
Attending: EMERGENCY MEDICINE | Admitting: EMERGENCY MEDICINE
Payer: MEDICARE

## 2019-09-14 VITALS
RESPIRATION RATE: 16 BRPM | SYSTOLIC BLOOD PRESSURE: 168 MMHG | OXYGEN SATURATION: 98 % | HEART RATE: 62 BPM | TEMPERATURE: 98 F | DIASTOLIC BLOOD PRESSURE: 77 MMHG

## 2019-09-14 VITALS
OXYGEN SATURATION: 98 % | RESPIRATION RATE: 16 BRPM | WEIGHT: 199.96 LBS | HEIGHT: 73 IN | HEART RATE: 66 BPM | TEMPERATURE: 99 F | SYSTOLIC BLOOD PRESSURE: 194 MMHG | DIASTOLIC BLOOD PRESSURE: 100 MMHG

## 2019-09-14 DIAGNOSIS — N18.6 END STAGE RENAL DISEASE: Chronic | ICD-10-CM

## 2019-09-14 LAB
ALBUMIN SERPL ELPH-MCNC: 3.5 G/DL — SIGNIFICANT CHANGE UP (ref 3.3–5)
ALP SERPL-CCNC: 112 U/L — SIGNIFICANT CHANGE UP (ref 40–120)
ALT FLD-CCNC: 20 U/L — SIGNIFICANT CHANGE UP (ref 12–78)
ANION GAP SERPL CALC-SCNC: 9 MMOL/L — SIGNIFICANT CHANGE UP (ref 5–17)
APPEARANCE UR: CLEAR — SIGNIFICANT CHANGE UP
AST SERPL-CCNC: 26 U/L — SIGNIFICANT CHANGE UP (ref 15–37)
BACTERIA # UR AUTO: ABNORMAL
BASOPHILS # BLD AUTO: 0.03 K/UL — SIGNIFICANT CHANGE UP (ref 0–0.2)
BASOPHILS NFR BLD AUTO: 0.4 % — SIGNIFICANT CHANGE UP (ref 0–2)
BILIRUB SERPL-MCNC: 0.6 MG/DL — SIGNIFICANT CHANGE UP (ref 0.2–1.2)
BILIRUB UR-MCNC: NEGATIVE — SIGNIFICANT CHANGE UP
BUN SERPL-MCNC: 20 MG/DL — SIGNIFICANT CHANGE UP (ref 7–23)
CALCIUM SERPL-MCNC: 9 MG/DL — SIGNIFICANT CHANGE UP (ref 8.5–10.1)
CHLORIDE SERPL-SCNC: 106 MMOL/L — SIGNIFICANT CHANGE UP (ref 96–108)
CO2 SERPL-SCNC: 27 MMOL/L — SIGNIFICANT CHANGE UP (ref 22–31)
COLOR SPEC: YELLOW — SIGNIFICANT CHANGE UP
COMMENT - URINE: SIGNIFICANT CHANGE UP
CREAT SERPL-MCNC: 1 MG/DL — SIGNIFICANT CHANGE UP (ref 0.5–1.3)
DIFF PNL FLD: ABNORMAL
EOSINOPHIL # BLD AUTO: 0.07 K/UL — SIGNIFICANT CHANGE UP (ref 0–0.5)
EOSINOPHIL NFR BLD AUTO: 0.9 % — SIGNIFICANT CHANGE UP (ref 0–6)
EPI CELLS # UR: SIGNIFICANT CHANGE UP
GLUCOSE SERPL-MCNC: 79 MG/DL — SIGNIFICANT CHANGE UP (ref 70–99)
GLUCOSE UR QL: NEGATIVE — SIGNIFICANT CHANGE UP
HCT VFR BLD CALC: 41.2 % — SIGNIFICANT CHANGE UP (ref 39–50)
HGB BLD-MCNC: 13.9 G/DL — SIGNIFICANT CHANGE UP (ref 13–17)
HYALINE CASTS # UR AUTO: ABNORMAL /LPF
IMM GRANULOCYTES NFR BLD AUTO: 0.5 % — SIGNIFICANT CHANGE UP (ref 0–1.5)
KETONES UR-MCNC: NEGATIVE — SIGNIFICANT CHANGE UP
LEUKOCYTE ESTERASE UR-ACNC: NEGATIVE — SIGNIFICANT CHANGE UP
LYMPHOCYTES # BLD AUTO: 0.6 K/UL — LOW (ref 1–3.3)
LYMPHOCYTES # BLD AUTO: 7.7 % — LOW (ref 13–44)
MCHC RBC-ENTMCNC: 29.4 PG — SIGNIFICANT CHANGE UP (ref 27–34)
MCHC RBC-ENTMCNC: 33.7 GM/DL — SIGNIFICANT CHANGE UP (ref 32–36)
MCV RBC AUTO: 87.1 FL — SIGNIFICANT CHANGE UP (ref 80–100)
MONOCYTES # BLD AUTO: 0.53 K/UL — SIGNIFICANT CHANGE UP (ref 0–0.9)
MONOCYTES NFR BLD AUTO: 6.8 % — SIGNIFICANT CHANGE UP (ref 2–14)
NEUTROPHILS # BLD AUTO: 6.53 K/UL — SIGNIFICANT CHANGE UP (ref 1.8–7.4)
NEUTROPHILS NFR BLD AUTO: 83.7 % — HIGH (ref 43–77)
NITRITE UR-MCNC: NEGATIVE — SIGNIFICANT CHANGE UP
NRBC # BLD: 0 /100 WBCS — SIGNIFICANT CHANGE UP (ref 0–0)
PH UR: 5 — SIGNIFICANT CHANGE UP (ref 5–8)
PLATELET # BLD AUTO: 188 K/UL — SIGNIFICANT CHANGE UP (ref 150–400)
POTASSIUM SERPL-MCNC: 3.5 MMOL/L — SIGNIFICANT CHANGE UP (ref 3.5–5.3)
POTASSIUM SERPL-SCNC: 3.5 MMOL/L — SIGNIFICANT CHANGE UP (ref 3.5–5.3)
PROT SERPL-MCNC: 7.4 G/DL — SIGNIFICANT CHANGE UP (ref 6–8.3)
PROT UR-MCNC: 25 MG/DL
RBC # BLD: 4.73 M/UL — SIGNIFICANT CHANGE UP (ref 4.2–5.8)
RBC # FLD: 13.9 % — SIGNIFICANT CHANGE UP (ref 10.3–14.5)
RBC CASTS # UR COMP ASSIST: ABNORMAL /HPF (ref 0–4)
SODIUM SERPL-SCNC: 142 MMOL/L — SIGNIFICANT CHANGE UP (ref 135–145)
SP GR SPEC: 1.02 — SIGNIFICANT CHANGE UP (ref 1.01–1.02)
UROBILINOGEN FLD QL: 1
WBC # BLD: 7.8 K/UL — SIGNIFICANT CHANGE UP (ref 3.8–10.5)
WBC # FLD AUTO: 7.8 K/UL — SIGNIFICANT CHANGE UP (ref 3.8–10.5)
WBC UR QL: SIGNIFICANT CHANGE UP

## 2019-09-14 PROCEDURE — 93010 ELECTROCARDIOGRAM REPORT: CPT

## 2019-09-14 PROCEDURE — 93005 ELECTROCARDIOGRAM TRACING: CPT

## 2019-09-14 PROCEDURE — 36415 COLL VENOUS BLD VENIPUNCTURE: CPT

## 2019-09-14 PROCEDURE — 85027 COMPLETE CBC AUTOMATED: CPT

## 2019-09-14 PROCEDURE — 82962 GLUCOSE BLOOD TEST: CPT

## 2019-09-14 PROCEDURE — 99283 EMERGENCY DEPT VISIT LOW MDM: CPT

## 2019-09-14 PROCEDURE — 81001 URINALYSIS AUTO W/SCOPE: CPT

## 2019-09-14 PROCEDURE — 80053 COMPREHEN METABOLIC PANEL: CPT

## 2019-09-14 PROCEDURE — 99283 EMERGENCY DEPT VISIT LOW MDM: CPT | Mod: 25

## 2019-09-14 NOTE — ED ADULT NURSE NOTE - OBJECTIVE STATEMENT
pt brought in by EMS for hypoglycemia, at home his bg was 25, pt was wearing humalog insulin pump but wife took it off at home. pt states " I feel fine now" pt alert and oriented times 4, states he woke up with EMS and Police over him.

## 2019-09-14 NOTE — ED PROVIDER NOTE - PROGRESS NOTE DETAILS
patient feeling well, ate tray of food, wants to go home, agrees to f/u with endocrinologist on Monday

## 2019-09-14 NOTE — ED PROVIDER NOTE - OBJECTIVE STATEMENT
53 male presents to ER by ambulance with finger stick of 25, given ampule of D50 IV. Patient states he does not recall what happened, states he awoke to ambulance and police crew around him. Patient states he has an insulin pump and it was removed, currently feels well, offers no complaints. Patient states he believes he may have skipped a meal yesterday.

## 2019-09-14 NOTE — ED PROVIDER NOTE - PATIENT PORTAL LINK FT
You can access the FollowMyHealth Patient Portal offered by [facility] by registering at the following website: [email or web address]. By joining [facility]’s FollowMyHealth portal, you will also be able to view your health information using other applications (apps) compatible with our system.

## 2019-09-30 ENCOUNTER — OTHER (OUTPATIENT)
Age: 54
End: 2019-09-30

## 2019-11-18 ENCOUNTER — MEDICATION RENEWAL (OUTPATIENT)
Age: 54
End: 2019-11-18

## 2019-12-09 ENCOUNTER — APPOINTMENT (OUTPATIENT)
Dept: NEPHROLOGY | Facility: CLINIC | Age: 54
End: 2019-12-09
Payer: MEDICARE

## 2019-12-09 VITALS
WEIGHT: 304 LBS | DIASTOLIC BLOOD PRESSURE: 79 MMHG | OXYGEN SATURATION: 97 % | HEART RATE: 80 BPM | BODY MASS INDEX: 41.17 KG/M2 | SYSTOLIC BLOOD PRESSURE: 155 MMHG | TEMPERATURE: 98.1 F | HEIGHT: 72 IN

## 2019-12-09 PROCEDURE — 99214 OFFICE O/P EST MOD 30 MIN: CPT | Mod: 25

## 2019-12-09 PROCEDURE — 90686 IIV4 VACC NO PRSV 0.5 ML IM: CPT

## 2019-12-09 PROCEDURE — G0008: CPT

## 2019-12-09 NOTE — REASON FOR VISIT
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

## 2019-12-09 NOTE — END OF VISIT
[Time Spent: ___ minutes] : I have spent [unfilled] minutes of face to face time with the patient [>50% of Time Spent on Counseling for ____] : Greater than 50% of the encounter time was spent on counseling for [unfilled] [FreeTextEntry1] : JENN Chi

## 2019-12-09 NOTE — HISTORY OF PRESENT ILLNESS
[FreeTextEntry1] : 53 y/o M s/p LURT 12/9/13. \par Left ear squamous cell Ca, s/p Moh surgery, Had skin grafting from the chest wall.\par Has had LE edema right > left, chronic\par No SOB/fever/urinary symptoms.\par Still has left side heat/cold sensory loss. Not driving, balance is still not perfect. \par Still has difficulty swallowing solids, able to swallow with liquids\par Glucose level has been better, he is on insulin pump for glycemic control now.\par on f/u with Dr. Miranda- Primary physician\par Nephrologist: Dr. Barker.\par Neurologist: Dr. Campbell\par Endocrinologist- Dr. Garcia\par

## 2019-12-09 NOTE — PHYSICAL EXAM
[General Appearance - Alert] : alert [General Appearance - Well Nourished] : well nourished [General Appearance - Well Developed] : well developed [Extraocular Movements] : extraocular movements were intact [Sclera] : the sclera and conjunctiva were normal [Hearing Threshold Finger Rub Not [place]] : hearing was normal [Oropharynx] : the oropharynx was normal [Neck Cervical Mass (___cm)] : no neck mass was observed [Jugular Venous Distention Increased] : there was no jugular-venous distention [Auscultation Breath Sounds / Voice Sounds] : lungs were clear to auscultation bilaterally [Respiration, Rhythm And Depth] : normal respiratory rhythm and effort [Heart Sounds] : normal S1 and S2 [Heart Sounds Pericardial Friction Rub] : no pericardial rub [Heart Sounds Gallop] : no gallops [Abdomen Tenderness] : non-tender [Abdomen Soft] : soft [Cervical Lymph Nodes Enlarged Posterior Bilaterally] : posterior cervical [Cervical Lymph Nodes Enlarged Anterior Bilaterally] : anterior cervical [Supraclavicular Lymph Nodes Enlarged Bilaterally] : supraclavicular [Axillary Lymph Nodes Enlarged Bilaterally] : axillary [Inguinal Lymph Nodes Enlarged Bilaterally] : inguinal [No Spinal Tenderness] : no spinal tenderness [No CVA Tenderness] : no ~M costovertebral angle tenderness [Motor Tone] : muscle strength and tone were normal [___ (cm) Fistula] : [unfilled] (cm) fistula [] : no rash [Oriented To Time, Place, And Person] : oriented to person, place, and time [Affect] : the affect was normal [Mood] : the mood was normal [FreeTextEntry1] : no tremor

## 2019-12-09 NOTE — ASSESSMENT
[FreeTextEntry1] : LURT:  Has chronic lower extremity edema,  Will check labs.  \par \par Squamous cell Ca: If recurs/new lesions, decrease azathioprine to 50 mg/day\par \par Infectious Prophylaxis: On Bactrim.  Flu vaccine and pneumonia vaccine advised.\par \par LE edema :Had sonogram already from primary MD in July 2018 no DVT. Discussed low sodium diet, will check urinalysis.\par \par Hypertension:Controlled. Continue current medications.\par \par DM:  On insulin pump.-On follow up with endocrinology. Dr. Garcia\par \par CVA -Oct 2015. Minimal residues including balance issues and occasional swallowing issues with solids.  Continues to see neurologist , exercise.\par \par Hyperlipidemia: On statin.\par \par Health maintenance - Seeing dermatology, was treated with surgery for squamous cell Ca with Moh surgery and skin grafting on left Pinna. Follows up with Ophthalmology regularly. \par \par Discussed weight loss strategies, low calorie diet. Regular daily physical activity.\par \par Discussed Renal Preservation strategies including achieving optimal weight through calory restriction and regular exercise, daily exercise, sleep hygiene, optimized control of glucose, blood pressure, lipids, avoidance of NSAIDs, Low Na and Low animal protein diet and medication adherence.\par \par He will f/u with primary nephrologist periodically Dr. Barker in 3-6 months. F/u at transplant center yearly.\par Gave flu vaccine right deltoid Fluzone quadrivalent \par Lot [id number]\par NDC 14182-759-64\par exp 6/30/20\par \par \par \par \par

## 2019-12-09 NOTE — REVIEW OF SYSTEMS
[As Noted in HPI] : as noted in HPI [As noted in HPI] : as noted in HPI [Lower Ext Edema] : lower extremity edema [SOB on Exertion] : shortness of breath during exertion [Negative] : Heme/Lymph [Fever] : no fever [Chills] : no chills [FreeTextEntry4] : chronic rhinorrhea [Cough] : no cough [FreeTextEntry6] : on home oxygen

## 2019-12-10 LAB
25(OH)D3 SERPL-MCNC: 18.3 NG/ML
ALBUMIN SERPL ELPH-MCNC: 4.1 G/DL
ALP BLD-CCNC: 106 U/L
ALT SERPL-CCNC: 12 U/L
ANION GAP SERPL CALC-SCNC: 14 MMOL/L
APPEARANCE: CLEAR
AST SERPL-CCNC: 18 U/L
BACTERIA: NEGATIVE
BASOPHILS # BLD AUTO: 0.05 K/UL
BASOPHILS NFR BLD AUTO: 0.7 %
BILIRUB SERPL-MCNC: 0.6 MG/DL
BILIRUBIN URINE: NEGATIVE
BKV DNA SPEC QL NAA+PROBE: NOT DETECTED COPIES/ML
BLOOD URINE: NEGATIVE
BUN SERPL-MCNC: 19 MG/DL
CALCIUM SERPL-MCNC: 9.2 MG/DL
CALCIUM SERPL-MCNC: 9.2 MG/DL
CHLORIDE SERPL-SCNC: 101 MMOL/L
CHOLEST SERPL-MCNC: 115 MG/DL
CHOLEST/HDLC SERPL: 2.6 RATIO
CMV DNA SPEC QL NAA+PROBE: NOT DETECTED
CMVPCR LOG: NOT DETECTED LOG10IU/ML
CO2 SERPL-SCNC: 26 MMOL/L
COLOR: NORMAL
CREAT SERPL-MCNC: 1.21 MG/DL
CREAT SPEC-SCNC: 87 MG/DL
CREAT/PROT UR: 0.2 RATIO
EOSINOPHIL # BLD AUTO: 0.27 K/UL
EOSINOPHIL NFR BLD AUTO: 3.9 %
ESTIMATED AVERAGE GLUCOSE: 154 MG/DL
GLUCOSE QUALITATIVE U: NEGATIVE
GLUCOSE SERPL-MCNC: 72 MG/DL
HBA1C MFR BLD HPLC: 7 %
HCT VFR BLD CALC: 41.5 %
HDLC SERPL-MCNC: 44 MG/DL
HGB BLD-MCNC: 13.1 G/DL
HYALINE CASTS: 0 /LPF
IMM GRANULOCYTES NFR BLD AUTO: 0.4 %
KETONES URINE: NEGATIVE
LDH SERPL-CCNC: 160 U/L
LDLC SERPL CALC-MCNC: 58 MG/DL
LEUKOCYTE ESTERASE URINE: NEGATIVE
LYMPHOCYTES # BLD AUTO: 1.12 K/UL
LYMPHOCYTES NFR BLD AUTO: 16.3 %
MAGNESIUM SERPL-MCNC: 1.9 MG/DL
MAN DIFF?: NORMAL
MCHC RBC-ENTMCNC: 29 PG
MCHC RBC-ENTMCNC: 31.6 GM/DL
MCV RBC AUTO: 91.8 FL
MICROSCOPIC-UA: NORMAL
MONOCYTES # BLD AUTO: 0.6 K/UL
MONOCYTES NFR BLD AUTO: 8.7 %
NEUTROPHILS # BLD AUTO: 4.81 K/UL
NEUTROPHILS NFR BLD AUTO: 70 %
NITRITE URINE: NEGATIVE
PARATHYROID HORMONE INTACT: 76 PG/ML
PH URINE: 6
PHOSPHATE SERPL-MCNC: 3.3 MG/DL
PLATELET # BLD AUTO: 201 K/UL
POTASSIUM SERPL-SCNC: 4.2 MMOL/L
PROT SERPL-MCNC: 6.6 G/DL
PROT UR-MCNC: 15 MG/DL
PROTEIN URINE: NORMAL
RBC # BLD: 4.52 M/UL
RBC # FLD: 13.8 %
RED BLOOD CELLS URINE: 1 /HPF
SODIUM SERPL-SCNC: 141 MMOL/L
SPECIFIC GRAVITY URINE: 1.02
SQUAMOUS EPITHELIAL CELLS: 0 /HPF
TACROLIMUS SERPL-MCNC: 4.6 NG/ML
TRIGL SERPL-MCNC: 63 MG/DL
URATE SERPL-MCNC: 5.1 MG/DL
UROBILINOGEN URINE: NORMAL
WBC # FLD AUTO: 6.88 K/UL
WHITE BLOOD CELLS URINE: 1 /HPF

## 2019-12-16 ENCOUNTER — TRANSCRIPTION ENCOUNTER (OUTPATIENT)
Age: 54
End: 2019-12-16

## 2019-12-16 ENCOUNTER — INPATIENT (INPATIENT)
Facility: HOSPITAL | Age: 54
LOS: 2 days | Discharge: NURSING, REHAB OR LONG-TERM CARE | DRG: 617 | End: 2019-12-19
Attending: HOSPITALIST | Admitting: INTERNAL MEDICINE
Payer: MEDICARE

## 2019-12-16 VITALS
OXYGEN SATURATION: 97 % | WEIGHT: 300.05 LBS | DIASTOLIC BLOOD PRESSURE: 72 MMHG | HEIGHT: 73 IN | TEMPERATURE: 98 F | RESPIRATION RATE: 16 BRPM | HEART RATE: 63 BPM | SYSTOLIC BLOOD PRESSURE: 174 MMHG

## 2019-12-16 DIAGNOSIS — I63.9 CEREBRAL INFARCTION, UNSPECIFIED: ICD-10-CM

## 2019-12-16 DIAGNOSIS — E78.5 HYPERLIPIDEMIA, UNSPECIFIED: ICD-10-CM

## 2019-12-16 DIAGNOSIS — R09.89 OTHER SPECIFIED SYMPTOMS AND SIGNS INVOLVING THE CIRCULATORY AND RESPIRATORY SYSTEMS: ICD-10-CM

## 2019-12-16 DIAGNOSIS — N18.9 CHRONIC KIDNEY DISEASE, UNSPECIFIED: ICD-10-CM

## 2019-12-16 DIAGNOSIS — Z29.9 ENCOUNTER FOR PROPHYLACTIC MEASURES, UNSPECIFIED: ICD-10-CM

## 2019-12-16 DIAGNOSIS — Z94.0 KIDNEY TRANSPLANT STATUS: ICD-10-CM

## 2019-12-16 DIAGNOSIS — I10 ESSENTIAL (PRIMARY) HYPERTENSION: ICD-10-CM

## 2019-12-16 DIAGNOSIS — M86.9 OSTEOMYELITIS, UNSPECIFIED: ICD-10-CM

## 2019-12-16 DIAGNOSIS — E11.9 TYPE 2 DIABETES MELLITUS WITHOUT COMPLICATIONS: ICD-10-CM

## 2019-12-16 DIAGNOSIS — E11.42 TYPE 2 DIABETES MELLITUS WITH DIABETIC POLYNEUROPATHY: ICD-10-CM

## 2019-12-16 DIAGNOSIS — N18.6 END STAGE RENAL DISEASE: Chronic | ICD-10-CM

## 2019-12-16 LAB
ALBUMIN SERPL ELPH-MCNC: 3.4 G/DL — SIGNIFICANT CHANGE UP (ref 3.3–5)
ALP SERPL-CCNC: 112 U/L — SIGNIFICANT CHANGE UP (ref 40–120)
ALT FLD-CCNC: 19 U/L — SIGNIFICANT CHANGE UP (ref 12–78)
ANION GAP SERPL CALC-SCNC: 5 MMOL/L — SIGNIFICANT CHANGE UP (ref 5–17)
AST SERPL-CCNC: 16 U/L — SIGNIFICANT CHANGE UP (ref 15–37)
BASOPHILS # BLD AUTO: 0.06 K/UL — SIGNIFICANT CHANGE UP (ref 0–0.2)
BASOPHILS NFR BLD AUTO: 0.9 % — SIGNIFICANT CHANGE UP (ref 0–2)
BILIRUB SERPL-MCNC: 0.4 MG/DL — SIGNIFICANT CHANGE UP (ref 0.2–1.2)
BUN SERPL-MCNC: 22 MG/DL — SIGNIFICANT CHANGE UP (ref 7–23)
CALCIUM SERPL-MCNC: 8.9 MG/DL — SIGNIFICANT CHANGE UP (ref 8.5–10.1)
CHLORIDE SERPL-SCNC: 105 MMOL/L — SIGNIFICANT CHANGE UP (ref 96–108)
CO2 SERPL-SCNC: 30 MMOL/L — SIGNIFICANT CHANGE UP (ref 22–31)
CREAT SERPL-MCNC: 1.2 MG/DL — SIGNIFICANT CHANGE UP (ref 0.5–1.3)
CRP SERPL-MCNC: 2.62 MG/DL — HIGH (ref 0–0.4)
EOSINOPHIL # BLD AUTO: 0.24 K/UL — SIGNIFICANT CHANGE UP (ref 0–0.5)
EOSINOPHIL NFR BLD AUTO: 3.7 % — SIGNIFICANT CHANGE UP (ref 0–6)
ERYTHROCYTE [SEDIMENTATION RATE] IN BLOOD: 27 MM/HR — HIGH (ref 0–20)
GLUCOSE SERPL-MCNC: 270 MG/DL — HIGH (ref 70–99)
HCT VFR BLD CALC: 40.6 % — SIGNIFICANT CHANGE UP (ref 39–50)
HGB BLD-MCNC: 13.2 G/DL — SIGNIFICANT CHANGE UP (ref 13–17)
IMM GRANULOCYTES NFR BLD AUTO: 0.3 % — SIGNIFICANT CHANGE UP (ref 0–1.5)
LYMPHOCYTES # BLD AUTO: 1.01 K/UL — SIGNIFICANT CHANGE UP (ref 1–3.3)
LYMPHOCYTES # BLD AUTO: 15.7 % — SIGNIFICANT CHANGE UP (ref 13–44)
MCHC RBC-ENTMCNC: 28.8 PG — SIGNIFICANT CHANGE UP (ref 27–34)
MCHC RBC-ENTMCNC: 32.5 GM/DL — SIGNIFICANT CHANGE UP (ref 32–36)
MCV RBC AUTO: 88.6 FL — SIGNIFICANT CHANGE UP (ref 80–100)
MONOCYTES # BLD AUTO: 0.53 K/UL — SIGNIFICANT CHANGE UP (ref 0–0.9)
MONOCYTES NFR BLD AUTO: 8.2 % — SIGNIFICANT CHANGE UP (ref 2–14)
NEUTROPHILS # BLD AUTO: 4.59 K/UL — SIGNIFICANT CHANGE UP (ref 1.8–7.4)
NEUTROPHILS NFR BLD AUTO: 71.2 % — SIGNIFICANT CHANGE UP (ref 43–77)
NRBC # BLD: 0 /100 WBCS — SIGNIFICANT CHANGE UP (ref 0–0)
PLATELET # BLD AUTO: 221 K/UL — SIGNIFICANT CHANGE UP (ref 150–400)
POTASSIUM SERPL-MCNC: 4.3 MMOL/L — SIGNIFICANT CHANGE UP (ref 3.5–5.3)
POTASSIUM SERPL-SCNC: 4.3 MMOL/L — SIGNIFICANT CHANGE UP (ref 3.5–5.3)
PROT SERPL-MCNC: 7.4 G/DL — SIGNIFICANT CHANGE UP (ref 6–8.3)
RBC # BLD: 4.58 M/UL — SIGNIFICANT CHANGE UP (ref 4.2–5.8)
RBC # FLD: 13.5 % — SIGNIFICANT CHANGE UP (ref 10.3–14.5)
SODIUM SERPL-SCNC: 140 MMOL/L — SIGNIFICANT CHANGE UP (ref 135–145)
WBC # BLD: 6.45 K/UL — SIGNIFICANT CHANGE UP (ref 3.8–10.5)
WBC # FLD AUTO: 6.45 K/UL — SIGNIFICANT CHANGE UP (ref 3.8–10.5)

## 2019-12-16 PROCEDURE — 93010 ELECTROCARDIOGRAM REPORT: CPT

## 2019-12-16 PROCEDURE — 99285 EMERGENCY DEPT VISIT HI MDM: CPT

## 2019-12-16 PROCEDURE — 73660 X-RAY EXAM OF TOE(S): CPT | Mod: 26,LT

## 2019-12-16 PROCEDURE — 99223 1ST HOSP IP/OBS HIGH 75: CPT | Mod: GC

## 2019-12-16 RX ORDER — HYDRALAZINE HCL 50 MG
25 TABLET ORAL EVERY 8 HOURS
Refills: 0 | Status: DISCONTINUED | OUTPATIENT
Start: 2019-12-16 | End: 2019-12-17

## 2019-12-16 RX ORDER — METOPROLOL TARTRATE 50 MG
25 TABLET ORAL
Refills: 0 | Status: DISCONTINUED | OUTPATIENT
Start: 2019-12-16 | End: 2019-12-17

## 2019-12-16 RX ORDER — LOSARTAN POTASSIUM 100 MG/1
25 TABLET, FILM COATED ORAL DAILY
Refills: 0 | Status: DISCONTINUED | OUTPATIENT
Start: 2019-12-16 | End: 2019-12-17

## 2019-12-16 RX ORDER — DEXTROSE 50 % IN WATER 50 %
25 SYRINGE (ML) INTRAVENOUS ONCE
Refills: 0 | Status: DISCONTINUED | OUTPATIENT
Start: 2019-12-16 | End: 2019-12-17

## 2019-12-16 RX ORDER — TACROLIMUS 5 MG/1
1.5 CAPSULE ORAL
Refills: 0 | Status: DISCONTINUED | OUTPATIENT
Start: 2019-12-16 | End: 2019-12-17

## 2019-12-16 RX ORDER — SODIUM CHLORIDE 9 MG/ML
1000 INJECTION INTRAMUSCULAR; INTRAVENOUS; SUBCUTANEOUS ONCE
Refills: 0 | Status: COMPLETED | OUTPATIENT
Start: 2019-12-16 | End: 2019-12-16

## 2019-12-16 RX ORDER — AZATHIOPRINE 100 MG/1
50 TABLET ORAL
Refills: 0 | Status: DISCONTINUED | OUTPATIENT
Start: 2019-12-16 | End: 2019-12-17

## 2019-12-16 RX ORDER — ENOXAPARIN SODIUM 100 MG/ML
40 INJECTION SUBCUTANEOUS EVERY 12 HOURS
Refills: 0 | Status: DISCONTINUED | OUTPATIENT
Start: 2019-12-16 | End: 2019-12-16

## 2019-12-16 RX ORDER — INSULIN LISPRO 100/ML
1 VIAL (ML) SUBCUTANEOUS
Refills: 0 | Status: DISCONTINUED | OUTPATIENT
Start: 2019-12-16 | End: 2019-12-17

## 2019-12-16 RX ORDER — DEXTROSE 50 % IN WATER 50 %
15 SYRINGE (ML) INTRAVENOUS ONCE
Refills: 0 | Status: DISCONTINUED | OUTPATIENT
Start: 2019-12-16 | End: 2019-12-17

## 2019-12-16 RX ORDER — SODIUM CHLORIDE 9 MG/ML
1000 INJECTION, SOLUTION INTRAVENOUS
Refills: 0 | Status: DISCONTINUED | OUTPATIENT
Start: 2019-12-16 | End: 2019-12-17

## 2019-12-16 RX ORDER — ATORVASTATIN CALCIUM 80 MG/1
10 TABLET, FILM COATED ORAL AT BEDTIME
Refills: 0 | Status: DISCONTINUED | OUTPATIENT
Start: 2019-12-16 | End: 2019-12-17

## 2019-12-16 RX ORDER — GABAPENTIN 400 MG/1
300 CAPSULE ORAL
Refills: 0 | Status: DISCONTINUED | OUTPATIENT
Start: 2019-12-16 | End: 2019-12-17

## 2019-12-16 RX ORDER — DEXTROSE 50 % IN WATER 50 %
12.5 SYRINGE (ML) INTRAVENOUS ONCE
Refills: 0 | Status: DISCONTINUED | OUTPATIENT
Start: 2019-12-16 | End: 2019-12-17

## 2019-12-16 RX ORDER — GLUCAGON INJECTION, SOLUTION 0.5 MG/.1ML
1 INJECTION, SOLUTION SUBCUTANEOUS ONCE
Refills: 0 | Status: DISCONTINUED | OUTPATIENT
Start: 2019-12-16 | End: 2019-12-17

## 2019-12-16 RX ADMIN — Medication 25 MILLIGRAM(S): at 19:30

## 2019-12-16 RX ADMIN — SODIUM CHLORIDE 1000 MILLILITER(S): 9 INJECTION INTRAMUSCULAR; INTRAVENOUS; SUBCUTANEOUS at 15:20

## 2019-12-16 RX ADMIN — SODIUM CHLORIDE 1000 MILLILITER(S): 9 INJECTION INTRAMUSCULAR; INTRAVENOUS; SUBCUTANEOUS at 16:20

## 2019-12-16 RX ADMIN — Medication 25 MILLIGRAM(S): at 21:23

## 2019-12-16 RX ADMIN — TACROLIMUS 1.5 MILLIGRAM(S): 5 CAPSULE ORAL at 21:22

## 2019-12-16 RX ADMIN — Medication 0.1 MILLIGRAM(S): at 19:29

## 2019-12-16 RX ADMIN — ATORVASTATIN CALCIUM 10 MILLIGRAM(S): 80 TABLET, FILM COATED ORAL at 21:23

## 2019-12-16 NOTE — CONSULT NOTE ADULT - SUBJECTIVE AND OBJECTIVE BOX
Patient is a 54y Male whom presented to the hospital with  End-stage renal failure with s/p  renal transplant  12/2013 living donor from sister in law    PAST MEDICAL & SURGICAL HISTORY:  CVA (cerebral vascular accident)  Obesity (BMI 30-39.9)  Diabetic peripheral neuropathy  CKD (chronic kidney disease)  Diabetes mellitus  Hyperlipidemia  Hypertension  End-stage renal failure with renal transplant: 12/2013  H/O foot surgery: 2005 - right foot - bone fracture - s/p ORIF and subsequent removal of hardware  Vasectomy status: s/p b/l  vasectomy  Ear disease: Left inner ear surgery  Toe infection: 2007 L 4th Toe surgery-amputation secondary to osteomyelitis      MEDICATIONS  (STANDING):      Allergies    No Known Allergies    Intolerances        SOCIAL HISTORY:  Denies ETOh,Smoking,     FAMILY HISTORY:  No pertinent family history in first degree relatives      REVIEW OF SYSTEMS:    CONSTITUTIONAL: No weakness, fevers or chills  EYES/ENT: No visual changes;  no throat pain   NECK: No pain or stiffness  RESPIRATORY: No cough, wheezing, hemoptysis; No shortness of breath  CARDIOVASCULAR: No chest pain or palpitations  GASTROINTESTINAL: No abdominal or epigastric pain. No nausea, vomiting,     No diarrhea or constipation. No melena   GENITOURINARY: No dysuria, frequency or hematuria  NEUROLOGICAL: No numbness or weakness  SKIN: dry  Home Medications:   * Incomplete Medication History as of 16-Dec-2019 14:23 documented in Structured Notes  · 	sulfamethoxazole-trimethoprim 400 mg-80 mg oral tablet: 1 tab(s) orally once a day   · 	azaTHIOprine 50 mg oral tablet: Last Dose Taken:  , 2 tab(s) orally once a day via peg  · 	famotidine 20 mg oral tablet: 1 tab(s) orally 2 times a day  · 	Combivent Respimat CFC free 20 mcg-100 mcg/inh inhalation aerosol: Last Dose Taken:  , 1 puff(s) inhaled 4 times a day, As Needed  · 	tacrolimus 1 mg oral capsule: 2 cap(s) orally 2 times a day  · 	senna 8.8 mg/5 mL oral syrup: 5 milliliter(s) orally once a day  · 	polyethylene glycol 3350 oral powder for reconstitution: 17 gram(s) orally 2 times a day  · 	hydrALAZINE 25 mg oral tablet: 1 tab(s) orally every 8 hours  · 	cloNIDine 0.1 mg oral tablet: Last Dose Taken:  , 1 tab(s) orally every 12 hours  · 	apixaban 5 mg oral tablet: Last Dose Taken:  , 2 tab(s) orally 2 times a day for 7 days until 5/17  · 	apixaban 5 mg oral tablet: Last Dose Taken:  , 1 tab(s) orally 2 times a day  · 	insulin glargine 100 units/mL subcutaneous solution: 25 unit(s) subcutaneous once a day (in the morning)  · 	insulin glargine 100 units/mL subcutaneous solution: 25 unit(s) subcutaneous once a day (at bedtime)  · 	insulin lispro 100 units/mL subcutaneous solution: 12 unit(s) subcutaneous 3 times a day (before meals)  · 	atorvastatin 20 mg oral tablet: Last Dose Taken:  , 1 tab(s) orally once a day (at bedtime)  · 	aspirin 81 mg oral delayed release tablet: Last Dose Taken:  , 1 tab(s) orally once a day  · 	metoprolol tartrate 75 mg oral tablet: 1 tab(s) orally 2 times a day      VITAL:  T(C): , Max: 36.5 (12-16-19 @ 14:17)  T(F): , Max: 97.7 (12-16-19 @ 14:17)  HR: 63 (12-16-19 @ 14:17)  BP: 174/72 (12-16-19 @ 14:17)  BP(mean): --  RR: 16 (12-16-19 @ 14:17)  SpO2: 97% (12-16-19 @ 14:17)  Wt(kg): --    I and O's:    Height (cm): 185.42 (12-16 @ 14:17)  Weight (kg): 136.1 (12-16 @ 14:17)  BMI (kg/m2): 39.6 (12-16 @ 14:17)  BSA (m2): 2.56 (12-16 @ 14:17)    PHYSICAL EXAM:    Constitutional: NAD  HEENT: conjunctive   clear   Neck:  No JVD  Respiratory: CTAB  Cardiovascular: S1 and S2  Gastrointestinal: BS+, soft, NT/ND  Extremities: No peripheral edema  Neurological: A/O x 3, no focal deficits  Psychiatric: Normal mood, normal affect  : No Benitez  Skin: Distal left 2nd digit ulceration noted measuring approximately 1cm x 1cm x 1cm  Access: Not applicable    LABS:                        13.2   6.45  )-----------( 221      ( 16 Dec 2019 15:26 )             40.6     12-16    140  |  105  |  22  ----------------------------<  270<H>  4.3   |  30  |  1.20    Ca    8.9      16 Dec 2019 15:26    TPro  7.4  /  Alb  3.4  /  TBili  0.4  /  DBili  x   /  AST  16  /  ALT  19  /  AlkPhos  112  12-16      Urine Studies:          RADIOLOGY & ADDITIONAL STUDIES:

## 2019-12-16 NOTE — H&P ADULT - NSHPSOCIALHISTORY_GEN_ALL_CORE
Never a smoker, drinks ETOH once every few weeks, denies any illicit drugs  independent in ADLs, walks without assistance, lives at home with wife

## 2019-12-16 NOTE — H&P ADULT - PROBLEM SELECTOR PLAN 3
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

## 2019-12-16 NOTE — ED ADULT TRIAGE NOTE - CHIEF COMPLAINT QUOTE
pt has a diabetic foot ulcer on left foot second toe.  wound x4 months podiatrist Nuvia  sent r/o osteo

## 2019-12-16 NOTE — H&P ADULT - PROBLEM SELECTOR PLAN 2
s/p right renal transplant in 2013  - cr. of 1.20, at baseline as per patient who followed up with nephrology recently  - continue home tacrolimus and azathioprine for antirejection meds  - avoid nephrotoxic meds  - Nephrology, Dr. Hilton consulted

## 2019-12-16 NOTE — CONSULT NOTE ADULT - PROBLEM SELECTOR RECOMMENDATION 2
End-stage renal failure with s/p  renal transplant  12/2013 living donor from sister in law  continue with current med progrf the same dose and imuran and home medication   Serum creatinine is stable at 1.2, approximating a GFR of is controlled  ml/min.   There is no progression.  No uremic symptoms. No evidence of  worsening  Anemia. Fluid status stable.   Will continue to avoid nephrotoxic drugs.  Patient remains asymptomatic.  Continue current therapy.

## 2019-12-16 NOTE — H&P ADULT - PROBLEM SELECTOR PLAN 4
patient on Insulin pump  - most recent HgbA1C of 6.6 as per patient, f/u repeat HgbA1c  - continue pump with home settings at this time  - Diabetes nurse educator consulted  - Endocrinology, Dr. Perlman consulted

## 2019-12-16 NOTE — H&P ADULT - NSHPOUTPATIENTPROVIDERS_GEN_ALL_CORE
PCP - Dr. Richard Miranda  Kidney Transplant - Dr. Navarro, Dr. [facility]  Podiatrist - Dr. Pedersen

## 2019-12-16 NOTE — H&P ADULT - NSICDXPASTSURGICALHX_GEN_ALL_CORE_FT
PAST SURGICAL HISTORY:  Ear disease Left inner ear surgery    End-stage renal failure with renal transplant 12/2013    H/O foot surgery 2005 - right foot - bone fracture - s/p ORIF and subsequent removal of hardware    Toe infection 2007 L 4th Toe surgery-amputation secondary to osteomyelitis    Vasectomy status s/p b/l  vasectomy

## 2019-12-16 NOTE — ED ADULT NURSE REASSESSMENT NOTE - NS ED NURSE REASSESS COMMENT FT1
patient provided with Dinner tray. Patient own insulin pump in place. Patient states he does not count carbs he just knows how much insulin to give himself. Pat Weil called regarding patient. All paperwork for patients own insulin pump filled out. Patient educated on what to do with insulin pump tonight when NPO. patient to be admitted to Med/Surg floor. Plan of care discussed with patient. Comfort and safety maintained. Will continue to monitor.

## 2019-12-16 NOTE — CONSULT NOTE ADULT - ASSESSMENT
The patient is a 54y Male with past medical history , End-stage renal failure with renal transplant  12/2013 living donor from sister in law CKD (chronic kidney disease)  , CVA (cerebral vascular accident)  , Diabetes mellitus  , Diabetic peripheral neuropathy  , Hyperlipidemia  , Hypertension  , Obesity (BMI 30-39.9) , Ear disease  Left inner ear surgery  , H/O foot surgery  2005 - right foot - bone fracture - s/p ORIF and subsequent removal of hardware  Toe infection  2007 L 4th Toe surgery-amputation secondary to osteomyelitis  , Vasectomy status  s/p b/l  vasectomy complaining of foot pain/injury.  Referred by Dr. Marin podiatrist for left 2nd toe osteomyelitis.  h/ o left 2nd toe infection x 4 months   pmd- Miranda in [place].  pt has insulin pump.

## 2019-12-16 NOTE — H&P ADULT - ATTENDING COMMENTS
Pt seen and examined at bedside. 53 yo M sent in by podiatry for L 2nd toe OM and amputation. Patient has no symptoms. Denies fevers, chills, pain in foot, headaches, nausea, vomiting, chest pain, SOB, palpitations, abdominal pain, constipation, diarrhea, melena, hematochezia, dysuria.   He is planned for amputation tomorrow by podiatry. LLE XR reviewed and shows osteomyelitis.     T(C): 36.5 (12-16-19 @ 14:17), Max: 36.5 (12-16-19 @ 14:17)  HR: 63 (12-16-19 @ 14:17) (63 - 63)  BP: 174/72 (12-16-19 @ 14:17) (174/72 - 174/72)  RR: 16 (12-16-19 @ 14:17) (16 - 16)  SpO2: 97% (12-16-19 @ 14:17) (97% - 97%)  Wt(kg): --    Physical Exam:   GENERAL: well-groomed, well-developed, NAD  HEENT: head NC/AT; EOM intact, conjunctiva & sclera clear; hearing grossly intact, moist mucous membranes  NECK: supple, no JVD  RESPIRATORY: CTA B/L, no wheezing, rales, rhonchi or rubs  CARDIOVASCULAR: S1&S2, RRR, no murmurs or gallops  ABDOMEN: soft, non-tender, non-distended, + Bowel sounds x4 quadrants, no guarding, rebound or rigidity  MUSCULOSKELETAL:  b/l LE edema, LLE slightly more swollen than RLE  LYMPH: no cervical lymphadenopathy  VASCULAR: Radial pulses 2+ bilaterally, no varicose veins   SKIN: warm and dry, color normal  NEUROLOGIC: AA&O X3, CN2-12 intact w/ no focal deficits, no sensory loss, motor Strength 5/5 in UE & LE B/L  Psych: Normal mood and affect, normal behavior    Plan:   Osteomyelitis: patient non-toxic appearing, afebrile, normal WBC count.   -can defer abx for now and await culture results.   -plan for OR in AM.   -NPO after midnight  -will hold pharm DVT ppx at this time due to OR tomorrow.   -ESR elevated    HTN: BP stable and at goal.   -continue Anti-htn meds    DMI: consult endocrine.   -may continue to use home insulin.     Kidney transplant: consulted Dr. Hilton.   -continue immunosuppressants. Meds reviewed with patient.   -avoid nephrotoxic meds.     DVT ppx: see above.     Full code. Pt seen and examined at bedside. 53 yo M sent in by podiatry for L 2nd toe OM and amputation. Patient has no symptoms. Denies fevers, chills, pain in foot, headaches, nausea, vomiting, chest pain, SOB, palpitations, abdominal pain, constipation, diarrhea, melena, hematochezia, dysuria.   He is planned for amputation tomorrow by podiatry. LLE XR reviewed and shows osteomyelitis.     T(C): 36.5 (12-16-19 @ 14:17), Max: 36.5 (12-16-19 @ 14:17)  HR: 63 (12-16-19 @ 14:17) (63 - 63)  BP: 174/72 (12-16-19 @ 14:17) (174/72 - 174/72)  RR: 16 (12-16-19 @ 14:17) (16 - 16)  SpO2: 97% (12-16-19 @ 14:17) (97% - 97%)  Wt(kg): --    Physical Exam:   GENERAL: well-groomed, well-developed, NAD  HEENT: head NC/AT; EOM intact, conjunctiva & sclera clear; hearing grossly intact, moist mucous membranes  NECK: supple, no JVD  RESPIRATORY: CTA B/L, no wheezing, rales, rhonchi or rubs  CARDIOVASCULAR: S1&S2, RRR, no murmurs or gallops  ABDOMEN: soft, non-tender, non-distended, + Bowel sounds x4 quadrants, no guarding, rebound or rigidity  MUSCULOSKELETAL:  b/l LE edema, LLE slightly more swollen than RLE  LYMPH: no cervical lymphadenopathy  VASCULAR: Radial pulses 2+ bilaterally, no varicose veins   SKIN: warm and dry, color normal  NEUROLOGIC: AA&O X3, CN2-12 intact w/ no focal deficits, no sensory loss, motor Strength 5/5 in UE & LE B/L  Psych: Normal mood and affect, normal behavior    Plan:   Osteomyelitis: patient non-toxic appearing, afebrile, normal WBC count.   -can defer abx for now and await culture results. Consult ID.  -plan for OR in AM.   -NPO after midnight  -will hold pharm DVT ppx at this time due to OR tomorrow.   -ESR elevated    HTN: BP stable and at goal.   -continue Anti-htn meds    DMI: consult endocrine.   -may continue to use home insulin.     Kidney transplant: consulted Dr. Hilton.   -continue immunosuppressants. Meds reviewed with patient.   -avoid nephrotoxic meds.     DVT ppx: see above.     Full code.

## 2019-12-16 NOTE — CONSULT NOTE ADULT - SUBJECTIVE AND OBJECTIVE BOX
HPI:  54y year old Male seen at [facility] ER for left 2nd digit osteomyelitis. Patient relates that he was being seen by an outside podiatrist for an ulceration to the tip of the left 2nd digit for approximately 3 months. Patient notes that he followed up with his podiatrist and was advised to go to the emergency room for an infection of the left 2nd toe. Patient relates that he has had an amputation of his left 4th digit several years prior and has not had any problems with the surgical site. Patient relates that he has been diabetic for approximately 25 years, had a stroke, and has had a right renal transplant (2013) and has been on anti-rejection medications. Denies any fever, chills, nausea, vomiting, chest pain, shortness of breath, or calf pain at this time.        PAST MEDICAL & SURGICAL HISTORY:  CVA (cerebral vascular accident)  Obesity (BMI 30-39.9)  Diabetic peripheral neuropathy  CKD (chronic kidney disease)  Diabetes mellitus  Hyperlipidemia  Hypertension  End-stage renal failure with renal transplant: 12/2013  H/O foot surgery: 2005 - right foot - bone fracture - s/p ORIF and subsequent removal of hardware  Vasectomy status: s/p b/l  vasectomy  Ear disease: Left inner ear surgery  Toe infection: 2007 L 4th Toe surgery-amputation secondary to osteomyelitis      Allergies    No Known Allergies    Intolerances      Home Medications:  apixaban 5 mg oral tablet: 1 tab(s) orally 2 times a day (16 Dec 2019 14:23)  aspirin 81 mg oral delayed release tablet: 1 tab(s) orally once a day (16 Dec 2019 14:23)  polyethylene glycol 3350 oral powder for reconstitution: 17 gram(s) orally 2 times a day (09 May 2017 12:40)  senna 8.8 mg/5 mL oral syrup: 5 milliliter(s) orally once a day (09 May 2017 12:40)      FAMILY HISTORY:  No pertinent family history in first degree relatives      Vital Signs Last 24 Hrs  T(C): 36.5 (16 Dec 2019 14:17), Max: 36.5 (16 Dec 2019 14:17)  T(F): 97.7 (16 Dec 2019 14:17), Max: 97.7 (16 Dec 2019 14:17)  HR: 63 (16 Dec 2019 14:17) (63 - 63)  BP: 174/72 (16 Dec 2019 14:17) (174/72 - 174/72)  BP(mean): --  RR: 16 (16 Dec 2019 14:17) (16 - 16)  SpO2: 97% (16 Dec 2019 14:17) (97% - 97%)      PHYSICAL EXAM:  Vascular: DP & PT faintly palpable bilaterally, Capillary refill 3 seconds to all digits bilaterally, No digital hair present bilaterally  Neurological: Loss of protective sensation noted bilaterally  Musculoskeletal: 5/5 strength in all quadrants bilaterally, s/p left partial 4th ray amputation, AJ & STJ ROM intact  Dermatological: Left 4th partial amputation site healed with no signs of dehiscence. Distal left 2nd digit ulceration noted measuring approximately 1cm x 1cm x 1cm, probing to bone, erythema noted to the left 2nd digit, no proximal streaking, no malodor, no fluctuance at this time      CBC Full  -  ( 16 Dec 2019 15:26 )  WBC Count : 6.45 K/uL  RBC Count : 4.58 M/uL  Hemoglobin : 13.2 g/dL  Hematocrit : 40.6 %  Platelet Count - Automated : 221 K/uL  Mean Cell Volume : 88.6 fl  Mean Cell Hemoglobin : 28.8 pg  Mean Cell Hemoglobin Concentration : 32.5 gm/dL  Auto Neutrophil # : 4.59 K/uL  Auto Lymphocyte # : 1.01 K/uL  Auto Monocyte # : 0.53 K/uL  Auto Eosinophil # : 0.24 K/uL  Auto Basophil # : 0.06 K/uL  Auto Neutrophil % : 71.2 %  Auto Lymphocyte % : 15.7 %  Auto Monocyte % : 8.2 %  Auto Eosinophil % : 3.7 %  Auto Basophil % : 0.9 %      12-16    140  |  105  |  22  ----------------------------<  270<H>  4.3   |  30  |  1.20    Ca    8.9      16 Dec 2019 15:26    TPro  7.4  /  Alb  3.4  /  TBili  0.4  /  DBili  x   /  AST  16  /  ALT  19  /  AlkPhos  112  12-16                Imaging: Radiograph reveals erosion of the left 2nd distal phalanx, no soft tissue emphysema noted

## 2019-12-16 NOTE — H&P ADULT - PROBLEM SELECTOR PLAN 1
failed outpatient treatment on daily bactrim, plan for surgical intervention on 12/17/19 with podiatry  - admit to general medicine floor  - continue daily bactrim at this time, patient has been on it since 2013 due to right kidney transplant  - dressings as per podiatry  - DASH/TLC diabetic diet for now, then NPO after midnight for procedure tomorrow  - patient with stable vitals, afebrile without leukocytosis, no concern for sepsis at this time  - f/u BCx2  - LLE US doppler for swelling, rule out DVT  - Podiatry, Dr. Catherine following failed outpatient treatment , plan for surgical intervention on 12/17/19 with podiatry  - admit to general medicine floor  - continue daily bactrim at this time due to his immunosuppression due to transplant  - dressings as per podiatry  - DASH/TLC diabetic diet for now, then NPO after midnight for procedure tomorrow  - patient with stable vitals, afebrile without leukocytosis, no concern for sepsis at this time  - f/u BCx2  - LLE US doppler for swelling, rule out DVT  - Podiatry, Dr. Catherine following

## 2019-12-16 NOTE — H&P ADULT - NSHPPHYSICALEXAM_GEN_ALL_CORE
Gen: well appearing, NAD, laying comfortably in bed  HEENT: NCAT, PEERLA b/l, EOMI b/l, no conjunctival erythema  Cardio: regular rate and rhythm, +s1s2, no murmurs, rubs, or gallops  Pulm: CTA b/l, no wheezes, rales or rhonchi  Abdomen: soft, nontender, nondistended, +BS x4 quadrants, no guarding  Extremities: LLE swelling greater than RLE, 1+ pitting edema in lower extremities b/l, left foot covered with dressing, 1+ peripheral pulses palpable   Neuro: AAOx3, CNII-XII intact grossly, 5/5 strength all extremities, sensation intact  Skin: warm and dry

## 2019-12-16 NOTE — ED PROVIDER NOTE - OBJECTIVE STATEMENT
Referred by Dr. Marin podiatrist for left 2nd toe osteomyelitis.  ho left 2nd toe infection x 4 months and has been on Bactrim bc he was kidney transplant recipient.   pmd- Ruben in [place]. Referred by Dr. Marin podiatrist for left 2nd toe osteomyelitis.  ho left 2nd toe infection x 4 months and has been on Bactrim bc he was kidney transplant recipient.   pmd- Miranda in [place].  pt has insulin pump.  no other complaints.

## 2019-12-16 NOTE — H&P ADULT - HISTORY OF PRESENT ILLNESS
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 Patient was being seen by podiatry Dr. Pabon for outpatient treatment of ulcer of the tip of the left 2nd toe for the past 3-4 months. Patient is on daily bactrim since 2013 due to his previous kidney transplant. 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 Patient s/p left 4th toe amputation a few years ago, without any post surgical complications. Patient admits to some swelling in the left lower extremity as compared to the right and b/l peripheral neuropathy. Denies any pain in the foot, chills or fever.     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 Patient was being seen by podiatry Dr. Pabon for outpatient treatment of ulcer of the tip of the left 2nd toe for the past 3-4 months. Patient is on daily bactrim since 2013 due to his previous kidney transplant. 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 Patient s/p left 4th toe amputation a few years ago, without any post surgical complications. Patient admits to some swelling in the left lower extremity as compared to the right and b/l peripheral neuropathy. Denies any pain in the foot, chills or fever.     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 Patient was being seen by podiatry Dr. Pabon for outpatient treatment of ulcer of the tip of the left 2nd toe for the past 3-4 months. Patient is on daily bactrim since 2013 due to his previous kidney transplant. 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 Patient s/p left 4th toe amputation a few years ago, without any post surgical complications. Patient admits to some swelling in the left lower extremity as compared to the right and b/l peripheral neuropathy. Denies any pain in the foot, chills or fever.     In the ED, vitals HR 63, /72, RR 16 Sp02 97% on RA, temp 97.7  1L NS IVF fluid bolus  EKG - NSR@ 58 with 1st degree AV block  Xray of left foot - osteomyelitis of the second terminal tuft

## 2019-12-16 NOTE — ED ADULT NURSE NOTE - OBJECTIVE STATEMENT
55 y/o male comes in A&Ox4 from home with complaints of left foot 2nd toe wound that is infected. patient states he was seen by his podiatrist who sent him here for possible osteo. Denies pain, fever, nausea or vomiting. Patient has DM type 1 and has an insulin pump. States he has numbness in both feel. PIV started. Plan of care discussed with patient. Comfort and safety maintained. Will continue to monitor.

## 2019-12-16 NOTE — ED ADULT NURSE NOTE - CHPI ED NUR SYMPTOMS NEG
no purulent drainage/no pain/no rectal pain/no fever/no vomiting/no chills/no blood in mucus/no drainage/no bleeding at site

## 2019-12-16 NOTE — H&P ADULT - NSHPREVIEWOFSYSTEMS_GEN_ALL_CORE
Constitutional: denies fever, chills, sweating  HEENT: denies headache, dizziness, or lightheadedness  Respiratory: denies SOB, cough, or wheezing  Cardiovascular: denies CP, palpitations  Gastrointestinal: denies nausea, vomiting, diarrhea, constipation, abdominal pain, or bloody stools  Genitourinary: denies painful urination, increased frequency, urgency, or bloody urine  Skin/Breast: denies rashes or itching  Musculoskeletal: denies muscle aches, joint swelling, or muscle weakness  Neurologic: denies loss of sensation, numbness, or tingling

## 2019-12-16 NOTE — H&P ADULT - ASSESSMENT
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 Admitted for left 2nd toe osteomyelitis with surgical intervention with podiatry.

## 2019-12-16 NOTE — H&P ADULT - PROBLEM SELECTOR PLAN 8
IMPROVE VTE Individual Risk Assessment          RISK                                                          Points  [ x ] Previous VTE                                                3  [  ] Thrombophilia                                             2  [  ] Lower limb paralysis                                   2        (unable to hold up >15 seconds)    [  ] Current Cancer                                             2         (within 6 months)  [  ] Immobilization > 24 hrs                              1  [  ] ICU/CCU stay > 24 hours                             1  [  ] Age > 60                                                         1    IMPROVE VTE Score: 3    history of b/l DVTs on previous hospitalization 3 years prior  heparin subq for DVT prophylaxis IMPROVE VTE Individual Risk Assessment          RISK                                                          Points  [ x ] Previous VTE                                                3  [  ] Thrombophilia                                             2  [  ] Lower limb paralysis                                   2        (unable to hold up >15 seconds)    [  ] Current Cancer                                             2         (within 6 months)  [  ] Immobilization > 24 hrs                              1  [  ] ICU/CCU stay > 24 hours                             1  [  ] Age > 60                                                         1    IMPROVE VTE Score: 3    history of b/l DVTs on previous hospitalization 3 years prior  lovenox for DVT prophylaxis

## 2019-12-16 NOTE — ED PROVIDER NOTE - PROGRESS NOTE DETAILS
case boaz Araiza and Dr. Chi (transplant) and pt.  pt preferred to be admitted here. case dw Matthew, trino

## 2019-12-17 ENCOUNTER — RESULT REVIEW (OUTPATIENT)
Age: 54
End: 2019-12-17

## 2019-12-17 LAB
ALBUMIN SERPL ELPH-MCNC: 3.1 G/DL — LOW (ref 3.3–5)
ALP SERPL-CCNC: 97 U/L — SIGNIFICANT CHANGE UP (ref 40–120)
ALT FLD-CCNC: 15 U/L — SIGNIFICANT CHANGE UP (ref 12–78)
ANION GAP SERPL CALC-SCNC: 5 MMOL/L — SIGNIFICANT CHANGE UP (ref 5–17)
AST SERPL-CCNC: 16 U/L — SIGNIFICANT CHANGE UP (ref 15–37)
BASOPHILS # BLD AUTO: 0.06 K/UL — SIGNIFICANT CHANGE UP (ref 0–0.2)
BASOPHILS NFR BLD AUTO: 0.8 % — SIGNIFICANT CHANGE UP (ref 0–2)
BILIRUB SERPL-MCNC: 0.6 MG/DL — SIGNIFICANT CHANGE UP (ref 0.2–1.2)
BUN SERPL-MCNC: 16 MG/DL — SIGNIFICANT CHANGE UP (ref 7–23)
CALCIUM SERPL-MCNC: 8.3 MG/DL — LOW (ref 8.5–10.1)
CHLORIDE SERPL-SCNC: 108 MMOL/L — SIGNIFICANT CHANGE UP (ref 96–108)
CO2 SERPL-SCNC: 30 MMOL/L — SIGNIFICANT CHANGE UP (ref 22–31)
CREAT SERPL-MCNC: 1 MG/DL — SIGNIFICANT CHANGE UP (ref 0.5–1.3)
EOSINOPHIL # BLD AUTO: 0.26 K/UL — SIGNIFICANT CHANGE UP (ref 0–0.5)
EOSINOPHIL NFR BLD AUTO: 3.5 % — SIGNIFICANT CHANGE UP (ref 0–6)
GLUCOSE SERPL-MCNC: 119 MG/DL — HIGH (ref 70–99)
HBA1C BLD-MCNC: 7.1 % — HIGH (ref 4–5.6)
HCT VFR BLD CALC: 38.9 % — LOW (ref 39–50)
HGB BLD-MCNC: 12.8 G/DL — LOW (ref 13–17)
IMM GRANULOCYTES NFR BLD AUTO: 0.3 % — SIGNIFICANT CHANGE UP (ref 0–1.5)
LYMPHOCYTES # BLD AUTO: 1.29 K/UL — SIGNIFICANT CHANGE UP (ref 1–3.3)
LYMPHOCYTES # BLD AUTO: 17.3 % — SIGNIFICANT CHANGE UP (ref 13–44)
MCHC RBC-ENTMCNC: 28.9 PG — SIGNIFICANT CHANGE UP (ref 27–34)
MCHC RBC-ENTMCNC: 32.9 GM/DL — SIGNIFICANT CHANGE UP (ref 32–36)
MCV RBC AUTO: 87.8 FL — SIGNIFICANT CHANGE UP (ref 80–100)
MONOCYTES # BLD AUTO: 0.46 K/UL — SIGNIFICANT CHANGE UP (ref 0–0.9)
MONOCYTES NFR BLD AUTO: 6.2 % — SIGNIFICANT CHANGE UP (ref 2–14)
NEUTROPHILS # BLD AUTO: 5.38 K/UL — SIGNIFICANT CHANGE UP (ref 1.8–7.4)
NEUTROPHILS NFR BLD AUTO: 71.9 % — SIGNIFICANT CHANGE UP (ref 43–77)
NRBC # BLD: 0 /100 WBCS — SIGNIFICANT CHANGE UP (ref 0–0)
PLATELET # BLD AUTO: 218 K/UL — SIGNIFICANT CHANGE UP (ref 150–400)
POTASSIUM SERPL-MCNC: 4 MMOL/L — SIGNIFICANT CHANGE UP (ref 3.5–5.3)
POTASSIUM SERPL-SCNC: 4 MMOL/L — SIGNIFICANT CHANGE UP (ref 3.5–5.3)
PROT SERPL-MCNC: 6.6 G/DL — SIGNIFICANT CHANGE UP (ref 6–8.3)
RBC # BLD: 4.43 M/UL — SIGNIFICANT CHANGE UP (ref 4.2–5.8)
RBC # FLD: 13.5 % — SIGNIFICANT CHANGE UP (ref 10.3–14.5)
SODIUM SERPL-SCNC: 143 MMOL/L — SIGNIFICANT CHANGE UP (ref 135–145)
WBC # BLD: 7.47 K/UL — SIGNIFICANT CHANGE UP (ref 3.8–10.5)
WBC # FLD AUTO: 7.47 K/UL — SIGNIFICANT CHANGE UP (ref 3.8–10.5)

## 2019-12-17 PROCEDURE — 99223 1ST HOSP IP/OBS HIGH 75: CPT | Mod: AI

## 2019-12-17 PROCEDURE — 93970 EXTREMITY STUDY: CPT | Mod: 26

## 2019-12-17 PROCEDURE — 28825 PARTIAL AMPUTATION OF TOE: CPT | Mod: T1

## 2019-12-17 PROCEDURE — 73630 X-RAY EXAM OF FOOT: CPT | Mod: 26,LT

## 2019-12-17 PROCEDURE — 88304 TISSUE EXAM BY PATHOLOGIST: CPT | Mod: 26

## 2019-12-17 PROCEDURE — 88311 DECALCIFY TISSUE: CPT | Mod: 26

## 2019-12-17 PROCEDURE — 88305 TISSUE EXAM BY PATHOLOGIST: CPT | Mod: 26

## 2019-12-17 RX ORDER — DEXTROSE 50 % IN WATER 50 %
15 SYRINGE (ML) INTRAVENOUS ONCE
Refills: 0 | Status: DISCONTINUED | OUTPATIENT
Start: 2019-12-17 | End: 2019-12-19

## 2019-12-17 RX ORDER — GABAPENTIN 400 MG/1
300 CAPSULE ORAL
Refills: 0 | Status: DISCONTINUED | OUTPATIENT
Start: 2019-12-17 | End: 2019-12-19

## 2019-12-17 RX ORDER — SODIUM CHLORIDE 9 MG/ML
1000 INJECTION, SOLUTION INTRAVENOUS
Refills: 0 | Status: DISCONTINUED | OUTPATIENT
Start: 2019-12-17 | End: 2019-12-17

## 2019-12-17 RX ORDER — GLUCAGON INJECTION, SOLUTION 0.5 MG/.1ML
1 INJECTION, SOLUTION SUBCUTANEOUS ONCE
Refills: 0 | Status: DISCONTINUED | OUTPATIENT
Start: 2019-12-17 | End: 2019-12-19

## 2019-12-17 RX ORDER — SODIUM CHLORIDE 9 MG/ML
1000 INJECTION, SOLUTION INTRAVENOUS
Refills: 0 | Status: DISCONTINUED | OUTPATIENT
Start: 2019-12-17 | End: 2019-12-19

## 2019-12-17 RX ORDER — DEXTROSE 50 % IN WATER 50 %
25 SYRINGE (ML) INTRAVENOUS ONCE
Refills: 0 | Status: DISCONTINUED | OUTPATIENT
Start: 2019-12-17 | End: 2019-12-19

## 2019-12-17 RX ORDER — ATORVASTATIN CALCIUM 80 MG/1
10 TABLET, FILM COATED ORAL AT BEDTIME
Refills: 0 | Status: DISCONTINUED | OUTPATIENT
Start: 2019-12-17 | End: 2019-12-19

## 2019-12-17 RX ORDER — METOPROLOL TARTRATE 50 MG
25 TABLET ORAL
Refills: 0 | Status: DISCONTINUED | OUTPATIENT
Start: 2019-12-17 | End: 2019-12-19

## 2019-12-17 RX ORDER — LOSARTAN POTASSIUM 100 MG/1
25 TABLET, FILM COATED ORAL DAILY
Refills: 0 | Status: DISCONTINUED | OUTPATIENT
Start: 2019-12-17 | End: 2019-12-19

## 2019-12-17 RX ORDER — AZATHIOPRINE 100 MG/1
50 TABLET ORAL
Refills: 0 | Status: DISCONTINUED | OUTPATIENT
Start: 2019-12-17 | End: 2019-12-19

## 2019-12-17 RX ORDER — HYDRALAZINE HCL 50 MG
25 TABLET ORAL EVERY 8 HOURS
Refills: 0 | Status: DISCONTINUED | OUTPATIENT
Start: 2019-12-17 | End: 2019-12-19

## 2019-12-17 RX ORDER — DEXTROSE 50 % IN WATER 50 %
12.5 SYRINGE (ML) INTRAVENOUS ONCE
Refills: 0 | Status: DISCONTINUED | OUTPATIENT
Start: 2019-12-17 | End: 2019-12-19

## 2019-12-17 RX ORDER — TACROLIMUS 5 MG/1
1.5 CAPSULE ORAL
Refills: 0 | Status: DISCONTINUED | OUTPATIENT
Start: 2019-12-17 | End: 2019-12-19

## 2019-12-17 RX ADMIN — Medication 25 MILLIGRAM(S): at 05:23

## 2019-12-17 RX ADMIN — ATORVASTATIN CALCIUM 10 MILLIGRAM(S): 80 TABLET, FILM COATED ORAL at 21:16

## 2019-12-17 RX ADMIN — AZATHIOPRINE 50 MILLIGRAM(S): 100 TABLET ORAL at 05:24

## 2019-12-17 RX ADMIN — Medication 25 MILLIGRAM(S): at 05:24

## 2019-12-17 RX ADMIN — SODIUM CHLORIDE 75 MILLILITER(S): 9 INJECTION, SOLUTION INTRAVENOUS at 12:15

## 2019-12-17 RX ADMIN — GABAPENTIN 300 MILLIGRAM(S): 400 CAPSULE ORAL at 05:24

## 2019-12-17 RX ADMIN — GABAPENTIN 300 MILLIGRAM(S): 400 CAPSULE ORAL at 20:04

## 2019-12-17 RX ADMIN — LOSARTAN POTASSIUM 25 MILLIGRAM(S): 100 TABLET, FILM COATED ORAL at 05:24

## 2019-12-17 RX ADMIN — Medication 25 MILLIGRAM(S): at 21:15

## 2019-12-17 RX ADMIN — SODIUM CHLORIDE 100 MILLILITER(S): 9 INJECTION, SOLUTION INTRAVENOUS at 16:16

## 2019-12-17 RX ADMIN — AZATHIOPRINE 50 MILLIGRAM(S): 100 TABLET ORAL at 20:04

## 2019-12-17 RX ADMIN — TACROLIMUS 1.5 MILLIGRAM(S): 5 CAPSULE ORAL at 09:25

## 2019-12-17 RX ADMIN — Medication 0.1 MILLIGRAM(S): at 20:04

## 2019-12-17 RX ADMIN — Medication 25 MILLIGRAM(S): at 13:29

## 2019-12-17 RX ADMIN — Medication 1 TABLET(S): at 05:24

## 2019-12-17 RX ADMIN — Medication 25 MILLIGRAM(S): at 20:04

## 2019-12-17 RX ADMIN — Medication 0.1 MILLIGRAM(S): at 05:24

## 2019-12-17 RX ADMIN — TACROLIMUS 1.5 MILLIGRAM(S): 5 CAPSULE ORAL at 21:15

## 2019-12-17 NOTE — PROGRESS NOTE ADULT - PROBLEM SELECTOR PLAN 2
End-stage renal failure with s/p  renal transplant  12/2013 living donor from sister in law  continue with current med progrf the same dose and imuran and home medication   Serum creatinine is stable at 1.0, approximating a GFR of is controlled  ml/min.   There is no progression.  No uremic symptoms. No evidence of  worsening  Anemia. Fluid status stable.   Will continue to avoid nephrotoxic drugs.  Patient remains asymptomatic.  Continue current therapy.

## 2019-12-17 NOTE — CONSULT NOTE ADULT - PROBLEM SELECTOR RECOMMENDATION 9
cont current csii/ insulin pump settings  goal bg 100-180 in hosp setting  cont cons cho diet
Admit for osteomyelitis send blood and urine cx,serial lactate levels,monitor vitals closley,ivfs hydration,monitor urine output and renal profile,iv abx as per id cons
Patient examined and evaluated at this time.  Discussed etiology of symptoms and all questions answered to patient and wife's satisfaction.  Given the patient's severity of symptoms, will plan for surgical intervention at this time. Discussed risks, benefits, and potential complications with patient and family members regarding surgical intervention including sepsis, loss of limb, and loss of life. All questions answered to satisfaction at this time.  Patient is on anti-rejection medications for his previous kidney transplant.  Surgery will require local anesthesia with minimal sedation, if any.  Antibiotics as per ID recommendations and medical team.  Requesting medical clearance.  Will plan for surgical intervention on 12/17/19 as an add-on.

## 2019-12-17 NOTE — CONSULT NOTE ADULT - SUBJECTIVE AND OBJECTIVE BOX
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 Patient was being seen by podiatry Dr. Pabon for outpatient treatment of ulcer of the tip of the left 2nd toe for the past 3-4 months. Patient is on daily bactrim since 2013 due to his previous kidney transplant. 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 Patient s/p left 4th toe amputation a few years ago, without any post surgical complications. Patient admits to some swelling in the left lower extremity as compared to the right and b/l peripheral neuropathy. Denies any pain in the foot, chills or fever.     In the ED, vitals HR 63, /72, RR 16 Sp02 97% on RA, temp 97.7  1L NS IVF fluid bolus  EKG - NSR@ 58 with 1st degree AV block  Xray of left foot - osteomyelitis of the second terminal tuft (16 Dec 2019 17:37)      PAST MEDICAL & SURGICAL HISTORY:  CVA (cerebral vascular accident)  Obesity (BMI 30-39.9)  Diabetic peripheral neuropathy  CKD (chronic kidney disease)  Diabetes mellitus  Hyperlipidemia  Hypertension  End-stage renal failure with renal transplant: 12/2013  H/O foot surgery: 2005 - right foot - bone fracture - s/p ORIF and subsequent removal of hardware  Vasectomy status: s/p b/l  vasectomy  Ear disease: Left inner ear surgery  Toe infection: 2007 L 4th Toe surgery-amputation secondary to osteomyelitis      FAMILY HISTORY:  No pertinent family history in first degree relatives        Social History:    MEDICATIONS  (STANDING):  atorvastatin 10 milliGRAM(s) Oral at bedtime  azaTHIOprine 50 milliGRAM(s) Oral two times a day  cloNIDine 0.1 milliGRAM(s) Oral every 12 hours  dextrose 5%. 1000 milliLiter(s) (50 mL/Hr) IV Continuous <Continuous>  dextrose 50% Injectable 12.5 Gram(s) IV Push once  dextrose 50% Injectable 25 Gram(s) IV Push once  dextrose 50% Injectable 25 Gram(s) IV Push once  gabapentin 300 milliGRAM(s) Oral two times a day  hydrALAZINE 25 milliGRAM(s) Oral every 8 hours  insulin lispro (HumaLOG) Pump 1 Each SubCutaneous Continuous Pump  losartan 25 milliGRAM(s) Oral daily  metoprolol tartrate 25 milliGRAM(s) Oral two times a day  tacrolimus 1.5 milliGRAM(s) Oral two times a day  trimethoprim   80 mG/sulfamethoxazole 400 mG 1 Tablet(s) Oral daily    MEDICATIONS  (PRN):  dextrose 40% Gel 15 Gram(s) Oral once PRN Blood Glucose LESS THAN 70 milliGRAM(s)/deciliter  glucagon  Injectable 1 milliGRAM(s) IntraMuscular once PRN Glucose LESS THAN 70 milligrams/deciliter        T(C): 36.7 (12-17-19 @ 05:09), Max: 36.9 (12-16-19 @ 19:25)  HR: 67 (12-17-19 @ 05:09) (58 - 71)  BP: 116/68 (12-17-19 @ 05:09) (116/68 - 174/84)  RR: 18 (12-17-19 @ 05:09) (16 - 18)  SpO2: 96% (12-17-19 @ 05:09) (95% - 98%)  Wt(kg): --    PHYSICAL EXAM:  GENERAL: NAD, well-groomed, well-developed  HEAD:  Atraumatic, Normocephalic  NECK: Supple, No JVD, Normal thyroid  CHEST/LUNG: Clear to percussion bilaterally; No rales, rhonchi, wheezing, or rubs  HEART: Regular rate and rhythm; No murmurs, rubs, or gallops  ABDOMEN: Soft, Nontender, Nondistended; Bowel sounds present  EXTREMITIES:  le foot dsg intact    CAPILLARY BLOOD GLUCOSE      POCT Blood Glucose.: 116 mg/dL (17 Dec 2019 06:26)  POCT Blood Glucose.: 170 mg/dL (16 Dec 2019 18:43)                            12.8   7.47  )-----------( 218      ( 17 Dec 2019 08:41 )             38.9       CMP:  12-16 @ 15:26  SGPT 19  Albumin 3.4   Alk Phos 112   Anion Gap 5   SGOT 16   Total Bili 0.4   BUN 22   Calcium Total 8.9   CO2 30   Chloride 105   Creatinine 1.20   eGFR if AA 80   eGFR if non AA 69   Glucose 270   Potassium 4.3   Protein 7.4   Sodium 140      Thyroid Function Tests:      Diabetes Tests:       Radiology:

## 2019-12-17 NOTE — PROGRESS NOTE ADULT - PROBLEM SELECTOR PLAN 8
IMPROVE VTE Score: 3    history of b/l DVTs on previous hospitalization 3 years prior  lovenox for DVT prophylaxis on hold for now for procedure

## 2019-12-17 NOTE — PROGRESS NOTE ADULT - PROBLEM SELECTOR PLAN 1
failed outpatient treatment , plan for surgical intervention today 12/17/19 with podiatry  - continue home daily bactrim at this time due to his immunosuppression due to transplant  - dressings as per podiatry  - NPO for procedure today  - patient with stable vitals, afebrile without leukocytosis, no concern for sepsis at this time  - f/u BCx2  - f/u LLE US doppler for swelling, rule out DVT  - patient hemodynamically stable and medically optimized for procedure today  - Podiatry, Dr. Catherine following failed outpatient treatment , plan for surgical intervention today 12/17/19 with podiatry  - continue home daily bactrim at this time due to his immunosuppression due to transplant  - dressings as per podiatry  - NPO for procedure today  - patient with stable vitals, afebrile without leukocytosis, no concern for sepsis at this time  - f/u BCx2  - f/u LLE US doppler for swelling, rule out DVT  - patient hemodynamically stable and medically optimized for procedure today, patient is moderate risk for a low risk procedure.  - Podiatry, Dr. Catherine following

## 2019-12-17 NOTE — PROGRESS NOTE ADULT - ASSESSMENT
The patient is a 54y Male with past medical history , End-stage renal failure with renal transplant  12/2013 living donor from sister in law CKD (chronic kidney disease)  , CVA (cerebral vascular accident)  , Diabetes mellitus  , Diabetic peripheral neuropathy  , Hyperlipidemia  , Hypertension  , Obesity (BMI 30-39.9) , Ear disease  Left inner ear surgery  , H/O foot surgery  2005 - right foot - bone fracture - s/p ORIF and subsequent removal of hardware  Toe infection  2007 L 4th Toe surgery-amputation secondary to osteomyelitis  , Vasectomy status  s/p b/l  vasectomy complaining of foot pain/injury.  Referred by Dr. Marin podiatrist for left 2nd toe osteomyelitis.  h/ o left 2nd toe infection x 4 months   pmd- [facility] in [place].  pt has insulin pump.

## 2019-12-17 NOTE — PROGRESS NOTE ADULT - SUBJECTIVE AND OBJECTIVE BOX
Patient is a 54y Male whom presented to the hospital with  End-stage renal failure with s/p  renal transplant  12/2013 living donor from sister in law    PAST MEDICAL & SURGICAL HISTORY:  CVA (cerebral vascular accident)  Obesity (BMI 30-39.9)  Diabetic peripheral neuropathy  CKD (chronic kidney disease)  Diabetes mellitus  Hyperlipidemia  Hypertension  End-stage renal failure with renal transplant: 12/2013  H/O foot surgery: 2005 - right foot - bone fracture - s/p ORIF and subsequent removal of hardware  Vasectomy status: s/p b/l  vasectomy  Ear disease: Left inner ear surgery  Toe infection: 2007 L 4th Toe surgery-amputation secondary to osteomyelitis      MEDICATIONS  (STANDING):      Allergies    No Known Allergies    Intolerances        SOCIAL HISTORY:  Denies ETOh,Smoking,     FAMILY HISTORY:  No pertinent family history in first degree relatives      REVIEW OF SYSTEMS:    CONSTITUTIONAL: No weakness, fevers or chills  EYES/ENT: No visual changes;  no throat pain   NECK: No pain or stiffness  RESPIRATORY: No cough, wheezing, hemoptysis; No shortness of breath  CARDIOVASCULAR: No chest pain or palpitations  GASTROINTESTINAL: No abdominal or epigastric pain. No nausea, vomiting,     No diarrhea or constipation. No melena   GENITOURINARY: No dysuria, frequency or hematuria  NEUROLOGICAL: No numbness or weakness  SKIN: dry  Home Medications:   * Incomplete Medication History as of 16-Dec-2019 14:23 documented in Structured Notes  · 	sulfamethoxazole-trimethoprim 400 mg-80 mg oral tablet: 1 tab(s) orally once a day   · 	azaTHIOprine 50 mg oral tablet: Last Dose Taken:  , 2 tab(s) orally once a day via peg  · 	famotidine 20 mg oral tablet: 1 tab(s) orally 2 times a day  · 	Combivent Respimat CFC free 20 mcg-100 mcg/inh inhalation aerosol: Last Dose Taken:  , 1 puff(s) inhaled 4 times a day, As Needed  · 	tacrolimus 1 mg oral capsule: 2 cap(s) orally 2 times a day  · 	senna 8.8 mg/5 mL oral syrup: 5 milliliter(s) orally once a day  · 	polyethylene glycol 3350 oral powder for reconstitution: 17 gram(s) orally 2 times a day  · 	hydrALAZINE 25 mg oral tablet: 1 tab(s) orally every 8 hours  · 	cloNIDine 0.1 mg oral tablet: Last Dose Taken:  , 1 tab(s) orally every 12 hours  · 	apixaban 5 mg oral tablet: Last Dose Taken:  , 2 tab(s) orally 2 times a day for 7 days until 5/17  · 	apixaban 5 mg oral tablet: Last Dose Taken:  , 1 tab(s) orally 2 times a day  · 	insulin glargine 100 units/mL subcutaneous solution: 25 unit(s) subcutaneous once a day (in the morning)  · 	insulin glargine 100 units/mL subcutaneous solution: 25 unit(s) subcutaneous once a day (at bedtime)  · 	insulin lispro 100 units/mL subcutaneous solution: 12 unit(s) subcutaneous 3 times a day (before meals)  · 	atorvastatin 20 mg oral tablet: Last Dose Taken:  , 1 tab(s) orally once a day (at bedtime)  · 	aspirin 81 mg oral delayed release tablet: Last Dose Taken:  , 1 tab(s) orally once a day  · 	metoprolol tartrate 75 mg oral tablet: 1 tab(s) orally 2 times a day                            12.8   7.47  )-----------( 218      ( 17 Dec 2019 08:41 )             38.9       CBC Full  -  ( 17 Dec 2019 08:41 )  WBC Count : 7.47 K/uL  RBC Count : 4.43 M/uL  Hemoglobin : 12.8 g/dL  Hematocrit : 38.9 %  Platelet Count - Automated : 218 K/uL  Mean Cell Volume : 87.8 fl  Mean Cell Hemoglobin : 28.9 pg  Mean Cell Hemoglobin Concentration : 32.9 gm/dL  Auto Neutrophil # : 5.38 K/uL  Auto Lymphocyte # : 1.29 K/uL  Auto Monocyte # : 0.46 K/uL  Auto Eosinophil # : 0.26 K/uL  Auto Basophil # : 0.06 K/uL  Auto Neutrophil % : 71.9 %  Auto Lymphocyte % : 17.3 %  Auto Monocyte % : 6.2 %  Auto Eosinophil % : 3.5 %  Auto Basophil % : 0.8 %      12-17    143  |  108  |  16  ----------------------------<  119<H>  4.0   |  30  |  1.00    Ca    8.3<L>      17 Dec 2019 08:41    TPro  6.6  /  Alb  3.1<L>  /  TBili  0.6  /  DBili  x   /  AST  16  /  ALT  15  /  AlkPhos  97  12-17      CAPILLARY BLOOD GLUCOSE      POCT Blood Glucose.: 108 mg/dL (17 Dec 2019 16:55)  POCT Blood Glucose.: 86 mg/dL (17 Dec 2019 16:28)  POCT Blood Glucose.: 74 mg/dL (17 Dec 2019 16:10)  POCT Blood Glucose.: 79 mg/dL (17 Dec 2019 14:55)  POCT Blood Glucose.: 94 mg/dL (17 Dec 2019 12:04)  POCT Blood Glucose.: 116 mg/dL (17 Dec 2019 06:26)      Vital Signs Last 24 Hrs  T(C): 37 (17 Dec 2019 19:07), Max: 37 (17 Dec 2019 16:07)  T(F): 98.6 (17 Dec 2019 19:07), Max: 98.6 (17 Dec 2019 16:07)  HR: 72 (17 Dec 2019 19:07) (58 - 72)  BP: 154/67 (17 Dec 2019 19:07) (116/68 - 170/79)  BP(mean): --  RR: 15 (17 Dec 2019 19:07) (14 - 18)  SpO2: 95% (17 Dec 2019 19:07) (93% - 100%)          PHYSICAL EXAM:    Constitutional: NAD  HEENT: conjunctive   clear   Neck:  No JVD  Respiratory: CTAB  Cardiovascular: S1 and S2  Gastrointestinal: BS+, soft, NT/ND  Extremities: No peripheral edema  Neurological: A/O x 3, no focal deficits  Psychiatric: Normal mood, normal affect  : No Bneitez  Skin: Distal left 2nd digit ulceration noted measuring approximately 1cm x 1cm x 1cm  Access: Not applicable    MEDICATIONS  (STANDING):  atorvastatin 10 milliGRAM(s) Oral at bedtime  azaTHIOprine 50 milliGRAM(s) Oral two times a day  cloNIDine 0.1 milliGRAM(s) Oral every 12 hours  dextrose 5% + lactated ringers. 1000 milliLiter(s) (75 mL/Hr) IV Continuous <Continuous>  dextrose 5%. 1000 milliLiter(s) (50 mL/Hr) IV Continuous <Continuous>  dextrose 50% Injectable 12.5 Gram(s) IV Push once  dextrose 50% Injectable 25 Gram(s) IV Push once  gabapentin 300 milliGRAM(s) Oral two times a day  hydrALAZINE 25 milliGRAM(s) Oral every 8 hours  losartan 25 milliGRAM(s) Oral daily  metoprolol tartrate 25 milliGRAM(s) Oral two times a day  tacrolimus 1.5 milliGRAM(s) Oral two times a day  trimethoprim   80 mG/sulfamethoxazole 400 mG 1 Tablet(s) Oral daily    MEDICATIONS  (PRN):  dextrose 40% Gel 15 Gram(s) Oral once PRN Blood Glucose LESS THAN 70 milliGRAM(s)/deciliter  glucagon  Injectable 1 milliGRAM(s) IntraMuscular once PRN Glucose LESS THAN 70 milligrams/deciliter 99

## 2019-12-17 NOTE — PROGRESS NOTE ADULT - SUBJECTIVE AND OBJECTIVE BOX
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 Patient was being seen by podiatry Dr. Pabon for outpatient treatment of ulcer of the tip of the left 2nd toe for the past 3-4 months. Patient is on daily bactrim since 2013 due to his previous kidney transplant. 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 Patient s/p left 4th toe amputation a few years ago, without any post surgical complications. Patient admits to some swelling in the left lower extremity as compared to the right and b/l peripheral neuropathy. Denies any pain in the foot, chills or fever.     In the ED, vitals HR 63, /72, RR 16 Sp02 97% on RA, temp 97.7  1L NS IVF fluid bolus  EKG - NSR@ 58 with 1st degree AV block  Xray of left foot - osteomyelitis of the second terminal tuft (16 Dec 2019 17:37)      ----  INTERVAL HPI/OVERNIGHT EVENTS:  Pt seen and evaluated at the bedside. No acute overnight events occurred. Patient feeling well and ready for surgical procedure today. Denies any pain in foot, chest pain, or shortness of breath.    ----  PAST MEDICAL & SURGICAL HISTORY:  CVA (cerebral vascular accident)  Obesity (BMI 30-39.9)  Diabetic peripheral neuropathy  CKD (chronic kidney disease)  Diabetes mellitus  Hyperlipidemia  Hypertension  End-stage renal failure with renal transplant: 12/2013  H/O foot surgery: 2005 - right foot - bone fracture - s/p ORIF and subsequent removal of hardware  Vasectomy status: s/p b/l  vasectomy  Ear disease: Left inner ear surgery  Toe infection: 2007 L 4th Toe surgery-amputation secondary to osteomyelitis    FAMILY HISTORY:  No pertinent family history in first degree relatives    Allergies  No Known Allergies  Intolerances    ----  REVIEW OF SYSTEMS:  CONSTITUTIONAL: denies fever, chills, fatigue, weakness  HEENT: denies blurred vision, sore throat  CARDIOVASCULAR: denies chest pain, chest pressure, palpitations  RESPIRATORY: denies shortness of breath, sputum production  GASTROINTESTINAL: denies nausea, vomiting, diarrhea, abdominal pain  GENITOURINARY: denies dysuria, discharge  NEUROLOGICAL: denies numbness, headache, focal weakness  MUSCULOSKELETAL: denies new joint pain, muscle aches  HEMATOLOGIC: denies gross bleeding, bruising  LYMPHATICS: denies enlarged lymph nodes, extremity swelling  PSYCHIATRIC: denies recent changes in anxiety, depression  ENDOCRINOLOGIC: denies sweating, cold or heat intolerance    ----  PHYSICAL EXAM:  GENERAL: patient appears well, no acute distress, appropriately interactive  EYES: sclera clear, no exudates  ENMT: oropharynx clear without erythema, moist mucous membranes  NECK: supple, soft, no thyromegaly noted  LUNGS: good air entry bilaterally, clear to auscultation, symmetric breath sounds, no wheezing or rhonchi appreciated  HEART: soft S1/S2, regular rate and rhythm, no murmurs noted, no noted edema to b/l LE  GASTROINTESTINAL: abdomen is soft, nontender, nondistended, normoactive bowel sounds, no palpable masses  MUSCULOSKELETAL: LLE swelling greater than RLE, 1+ pitting edema in lower extremities b/l, left foot covered with dressing, 1+ peripheral pulses palpable   NEUROLOGIC: awake, alert, oriented x3, good muscle tone in 4 extremities, no obvious sensory deficits  PSYCHIATRIC: mood is good, affect is congruent with mood, linear and logical thought process  HEME/LYMPH: no palpable supraclavicular nodules, no obvious ecchymosis     T(C): 36.7 (12-17-19 @ 05:09), Max: 36.9 (12-16-19 @ 19:25)  HR: 67 (12-17-19 @ 05:09) (58 - 71)  BP: 116/68 (12-17-19 @ 05:09) (116/68 - 174/84)  RR: 18 (12-17-19 @ 05:09) (16 - 18)  SpO2: 96% (12-17-19 @ 05:09) (95% - 98%)  Wt(kg): --    ----  I&O's Summary      LABS:                        12.8   7.47  )-----------( 218      ( 17 Dec 2019 08:41 )             38.9     12-16    140  |  105  |  22  ----------------------------<  270<H>  4.3   |  30  |  1.20    Ca    8.9      16 Dec 2019 15:26    TPro  7.4  /  Alb  3.4  /  TBili  0.4  /  DBili  x   /  AST  16  /  ALT  19  /  AlkPhos  112  12-16        CAPILLARY BLOOD GLUCOSE      POCT Blood Glucose.: 116 mg/dL (17 Dec 2019 06:26)  POCT Blood Glucose.: 170 mg/dL (16 Dec 2019 18:43)                ----  Personally reviewed:  Vital sign trends: [ x ] yes    [  ] no     [  ] n/a  Laboratory results: [x  ] yes    [  ] no     [  ] n/a  Radiology results: [  ] yes    [  ] no     [x  ] n/a  Culture results: [  ] yes    [  ] no     [ x ] n/a  Consultant recommendations: [ x ] yes    [  ] no     [  ] n/a

## 2019-12-17 NOTE — PROGRESS NOTE ADULT - PROBLEM SELECTOR PLAN 1
Admit for osteomyelitis send blood and urine cx,serial lactate levels,monitor vitals closley,ivfs hydration,monitor urine output and renal profile,iv abx as per id cons.

## 2019-12-18 ENCOUNTER — TRANSCRIPTION ENCOUNTER (OUTPATIENT)
Age: 54
End: 2019-12-18

## 2019-12-18 DIAGNOSIS — E10.65 TYPE 1 DIABETES MELLITUS WITH HYPERGLYCEMIA: ICD-10-CM

## 2019-12-18 LAB
ALBUMIN SERPL ELPH-MCNC: 3.1 G/DL — LOW (ref 3.3–5)
ALP SERPL-CCNC: 96 U/L — SIGNIFICANT CHANGE UP (ref 40–120)
ALT FLD-CCNC: 15 U/L — SIGNIFICANT CHANGE UP (ref 12–78)
ANION GAP SERPL CALC-SCNC: 5 MMOL/L — SIGNIFICANT CHANGE UP (ref 5–17)
AST SERPL-CCNC: 14 U/L — LOW (ref 15–37)
BILIRUB SERPL-MCNC: 0.5 MG/DL — SIGNIFICANT CHANGE UP (ref 0.2–1.2)
BUN SERPL-MCNC: 16 MG/DL — SIGNIFICANT CHANGE UP (ref 7–23)
CALCIUM SERPL-MCNC: 8.8 MG/DL — SIGNIFICANT CHANGE UP (ref 8.5–10.1)
CHLORIDE SERPL-SCNC: 106 MMOL/L — SIGNIFICANT CHANGE UP (ref 96–108)
CO2 SERPL-SCNC: 31 MMOL/L — SIGNIFICANT CHANGE UP (ref 22–31)
CREAT SERPL-MCNC: 1.1 MG/DL — SIGNIFICANT CHANGE UP (ref 0.5–1.3)
CULTURE RESULTS: SIGNIFICANT CHANGE UP
GLUCOSE SERPL-MCNC: 81 MG/DL — SIGNIFICANT CHANGE UP (ref 70–99)
GRAM STN FLD: SIGNIFICANT CHANGE UP
HCT VFR BLD CALC: 40.3 % — SIGNIFICANT CHANGE UP (ref 39–50)
HGB BLD-MCNC: 13.2 G/DL — SIGNIFICANT CHANGE UP (ref 13–17)
MCHC RBC-ENTMCNC: 28.9 PG — SIGNIFICANT CHANGE UP (ref 27–34)
MCHC RBC-ENTMCNC: 32.8 GM/DL — SIGNIFICANT CHANGE UP (ref 32–36)
MCV RBC AUTO: 88.4 FL — SIGNIFICANT CHANGE UP (ref 80–100)
NRBC # BLD: 0 /100 WBCS — SIGNIFICANT CHANGE UP (ref 0–0)
PLATELET # BLD AUTO: 220 K/UL — SIGNIFICANT CHANGE UP (ref 150–400)
POTASSIUM SERPL-MCNC: 4.1 MMOL/L — SIGNIFICANT CHANGE UP (ref 3.5–5.3)
POTASSIUM SERPL-SCNC: 4.1 MMOL/L — SIGNIFICANT CHANGE UP (ref 3.5–5.3)
PROT SERPL-MCNC: 7 G/DL — SIGNIFICANT CHANGE UP (ref 6–8.3)
RBC # BLD: 4.56 M/UL — SIGNIFICANT CHANGE UP (ref 4.2–5.8)
RBC # FLD: 13.5 % — SIGNIFICANT CHANGE UP (ref 10.3–14.5)
SODIUM SERPL-SCNC: 142 MMOL/L — SIGNIFICANT CHANGE UP (ref 135–145)
SPECIMEN SOURCE: SIGNIFICANT CHANGE UP
SPECIMEN SOURCE: SIGNIFICANT CHANGE UP
WBC # BLD: 7.25 K/UL — SIGNIFICANT CHANGE UP (ref 3.8–10.5)
WBC # FLD AUTO: 7.25 K/UL — SIGNIFICANT CHANGE UP (ref 3.8–10.5)

## 2019-12-18 PROCEDURE — 99233 SBSQ HOSP IP/OBS HIGH 50: CPT

## 2019-12-18 RX ORDER — ENOXAPARIN SODIUM 100 MG/ML
40 INJECTION SUBCUTANEOUS
Refills: 0 | Status: DISCONTINUED | OUTPATIENT
Start: 2019-12-18 | End: 2019-12-19

## 2019-12-18 RX ADMIN — Medication 25 MILLIGRAM(S): at 13:10

## 2019-12-18 RX ADMIN — Medication 25 MILLIGRAM(S): at 06:21

## 2019-12-18 RX ADMIN — TACROLIMUS 1.5 MILLIGRAM(S): 5 CAPSULE ORAL at 09:26

## 2019-12-18 RX ADMIN — Medication 25 MILLIGRAM(S): at 17:46

## 2019-12-18 RX ADMIN — TACROLIMUS 1.5 MILLIGRAM(S): 5 CAPSULE ORAL at 21:24

## 2019-12-18 RX ADMIN — Medication 1 TABLET(S): at 13:10

## 2019-12-18 RX ADMIN — Medication 0.1 MILLIGRAM(S): at 17:46

## 2019-12-18 RX ADMIN — ATORVASTATIN CALCIUM 10 MILLIGRAM(S): 80 TABLET, FILM COATED ORAL at 21:24

## 2019-12-18 RX ADMIN — ENOXAPARIN SODIUM 40 MILLIGRAM(S): 100 INJECTION SUBCUTANEOUS at 15:21

## 2019-12-18 RX ADMIN — LOSARTAN POTASSIUM 25 MILLIGRAM(S): 100 TABLET, FILM COATED ORAL at 06:21

## 2019-12-18 RX ADMIN — AZATHIOPRINE 50 MILLIGRAM(S): 100 TABLET ORAL at 21:24

## 2019-12-18 RX ADMIN — Medication 25 MILLIGRAM(S): at 21:24

## 2019-12-18 RX ADMIN — AZATHIOPRINE 50 MILLIGRAM(S): 100 TABLET ORAL at 09:26

## 2019-12-18 RX ADMIN — GABAPENTIN 300 MILLIGRAM(S): 400 CAPSULE ORAL at 17:46

## 2019-12-18 NOTE — DISCHARGE NOTE PROVIDER - HOSPITAL COURSE
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 Patient was being seen by podiatry Dr. Pabon for outpatient treatment of ulcer of the tip of the left 2nd toe for the past 3-4 months. Patient is on daily bactrim since 2013 due to his previous kidney transplant. 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 Patient s/p left 4th toe amputation a few years ago, without any post surgical complications. Patient admits to some swelling in the left lower extremity as compared to the right and b/l peripheral neuropathy. Denies any pain in the foot, chills or fever.         In the ED, vitals HR 63, /72, RR 16 Sp02 97% on RA, temp 97.7    1L NS IVF fluid bolus    EKG - NSR@ 58 with 1st degree AV block    Xray of left foot - osteomyelitis of the second terminal tuft (16 Dec 2019 17:37)            ---    HOSPITAL COURSE:         Patient transferred to general medical floor for further management. Nephrology, Dr. Hilton consulted. Creatinine remained stable at baseline. Endocrinology, Dr. Perlman consulted for patient's insulin pump. Patient was maintained on his insulin pump throughout his hospital stay. HgbA1C found to be 7.1. Podiatry, Dr. Catherine consulted. Culture from left wound found to be positive for corynebacterium. Patient was continued on his home bactrim. Patient taken to OR on 12/17 for left second toe partial amputation. Surgery had no complications. Patient transferred back to general medication floor for further management. BCx2 with no growth - prelim. Tissue cultures from procedure positive for rare gram variable coccobacilli. Bone pathology found to be ______                 ---    CONSULTANTS:         Podiatry - Dr. Catherine    Nephrology - Dr. Hilton    Endocrinology - Dr. 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 Patient was being seen by podiatry Dr. Pabon for outpatient treatment of ulcer of the tip of the left 2nd toe for the past 3-4 months. Patient is on daily bactrim since 2013 due to his previous kidney transplant. 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 Patient s/p left 4th toe amputation a few years ago, without any post surgical complications. Patient admits to some swelling in the left lower extremity as compared to the right and b/l peripheral neuropathy. Denies any pain in the foot, chills or fever.         In the ED, vitals HR 63, /72, RR 16 Sp02 97% on RA, temp 97.7    1L NS IVF fluid bolus    EKG - NSR@ 58 with 1st degree AV block    Xray of left foot - osteomyelitis of the second terminal tuft (16 Dec 2019 17:37)            ---    HOSPITAL COURSE:         Patient transferred to general medical floor for further management. Nephrology, Dr. Hilton consulted. Creatinine remained stable at baseline. Endocrinology, Dr. Perlman consulted for patient's insulin pump. Patient was maintained on his insulin pump throughout his hospital stay. HgbA1C found to be 7.1. Podiatry, Dr. Catherine consulted. Culture from left wound found to be positive for corynebacterium. Patient was continued on his home bactrim. Patient taken to OR on 12/17 for left second toe partial amputation. Surgery had no complications. Patient transferred back to general medication floor for further management. BCx2 with no growth - prelim. Tissue cultures from procedure positive for rare gram variable coccobacilli. Bone pathology found to be with negative margins. Patient hemodynamically stable and medically optimized for discharge.                 ---    CONSULTANTS:         Podiatry - Dr. Catherine    Nephrology - Dr. Hilton    Endocrinology - Dr. 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 Patient was being seen by podiatry Dr. Pabon for outpatient treatment of ulcer of the tip of the left 2nd toe for the past 3-4 months. Patient is on daily bactrim since 2013 due to his previous kidney transplant. 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 Patient s/p left 4th toe amputation a few years ago, without any post surgical complications. Patient admits to some swelling in the left lower extremity as compared to the right and b/l peripheral neuropathy. Denies any pain in the foot, chills or fever.         In the ED, vitals HR 63, /72, RR 16 Sp02 97% on RA, temp 97.7    1L NS IVF fluid bolus    EKG - NSR@ 58 with 1st degree AV block    Xray of left foot - osteomyelitis of the second terminal tuft (16 Dec 2019 17:37)            ---    HOSPITAL COURSE:         Patient transferred to general medical floor for further management. Nephrology, Dr. Hilton consulted. Creatinine remained stable at baseline. Endocrinology, Dr. Perlman consulted for patient's insulin pump. Patient was maintained on his insulin pump throughout his hospital stay. HgbA1C found to be 7.1. Podiatry, Dr. Catherine consulted. Culture from left wound found to be positive for corynebacterium. Patient was continued on his home bactrim. Patient taken to OR on 12/17 for left second toe partial amputation. Surgery had no complications. Patient transferred back to general medication floor for further management. BCx2 with no growth - prelim. Tissue cultures from procedure positive for rare gram variable coccobacilli. Bone pathology found to be with negative margins. Cleared by Podiatry for discharge on home antibiotic and follow up with Podiatry on Monday 12/23/19. Patient hemodynamically stable and medically optimized for discharge.                 ---    CONSULTANTS:         Podiatry - Dr. Catherine    Nephrology - Dr. Hilton    Endocrinology - Dr. Perlman    ---    TIME SPENT:    The total amount of time spent reviewing the hospital notes, laboratory values, imaging findings, assessing/counseling the patient, discussing with consultant physicians, social work, nursing staff took -- minutes        ---

## 2019-12-18 NOTE — PROGRESS NOTE ADULT - SUBJECTIVE AND OBJECTIVE BOX
Patient is a 54y Male whom presented to the hospital with  End-stage renal failure with s/p  renal transplant  12/2013 living donor from sister in law  pateint seen and examined nad , no fever , no chills    PAST MEDICAL & SURGICAL HISTORY:  CVA (cerebral vascular accident)  Obesity (BMI 30-39.9)  Diabetic peripheral neuropathy  CKD (chronic kidney disease)  Diabetes mellitus  Hyperlipidemia  Hypertension  End-stage renal failure with renal transplant: 12/2013  H/O foot surgery: 2005 - right foot - bone fracture - s/p ORIF and subsequent removal of hardware  Vasectomy status: s/p b/l  vasectomy  Ear disease: Left inner ear surgery  Toe infection: 2007 L 4th Toe surgery-amputation secondary to osteomyelitis      MEDICATIONS  (STANDING):      Allergies    No Known Allergies    Intolerances        SOCIAL HISTORY:  Denies ETOh,Smoking,     FAMILY HISTORY:  No pertinent family history in first degree relatives      REVIEW OF SYSTEMS:    CONSTITUTIONAL: No weakness, fevers or chills  EYES/ENT: No visual changes;  no throat pain   NECK: No pain or stiffness  RESPIRATORY: No cough, wheezing, hemoptysis; No shortness of breath  CARDIOVASCULAR: No chest pain or palpitations  GASTROINTESTINAL: No abdominal or epigastric pain. No nausea, vomiting,     No diarrhea or constipation. No melena   GENITOURINARY: No dysuria, frequency or hematuria  NEUROLOGICAL: No numbness or weakness  SKIN: dry  Home Medications:   * Incomplete Medication History as of 16-Dec-2019 14:23 documented in Structured Notes  · 	sulfamethoxazole-trimethoprim 400 mg-80 mg oral tablet: 1 tab(s) orally once a day   · 	azaTHIOprine 50 mg oral tablet: Last Dose Taken:  , 2 tab(s) orally once a day via peg  · 	famotidine 20 mg oral tablet: 1 tab(s) orally 2 times a day  · 	Combivent Respimat CFC free 20 mcg-100 mcg/inh inhalation aerosol: Last Dose Taken:  , 1 puff(s) inhaled 4 times a day, As Needed  · 	tacrolimus 1 mg oral capsule: 2 cap(s) orally 2 times a day  · 	senna 8.8 mg/5 mL oral syrup: 5 milliliter(s) orally once a day  · 	polyethylene glycol 3350 oral powder for reconstitution: 17 gram(s) orally 2 times a day  · 	hydrALAZINE 25 mg oral tablet: 1 tab(s) orally every 8 hours  · 	cloNIDine 0.1 mg oral tablet: Last Dose Taken:  , 1 tab(s) orally every 12 hours  · 	apixaban 5 mg oral tablet: Last Dose Taken:  , 2 tab(s) orally 2 times a day for 7 days until 5/17  · 	apixaban 5 mg oral tablet: Last Dose Taken:  , 1 tab(s) orally 2 times a day  · 	insulin glargine 100 units/mL subcutaneous solution: 25 unit(s) subcutaneous once a day (in the morning)  · 	insulin glargine 100 units/mL subcutaneous solution: 25 unit(s) subcutaneous once a day (at bedtime)  · 	insulin lispro 100 units/mL subcutaneous solution: 12 unit(s) subcutaneous 3 times a day (before meals)  · 	atorvastatin 20 mg oral tablet: Last Dose Taken:  , 1 tab(s) orally once a day (at bedtime)  · 	aspirin 81 mg oral delayed release tablet: Last Dose Taken:  , 1 tab(s) orally once a day  · 	metoprolol tartrate 75 mg oral tablet: 1 tab(s) orally 2 times a day                                         13.2   7.25  )-----------( 220      ( 18 Dec 2019 07:54 )             40.3       CBC Full  -  ( 18 Dec 2019 07:54 )  WBC Count : 7.25 K/uL  RBC Count : 4.56 M/uL  Hemoglobin : 13.2 g/dL  Hematocrit : 40.3 %  Platelet Count - Automated : 220 K/uL  Mean Cell Volume : 88.4 fl  Mean Cell Hemoglobin : 28.9 pg  Mean Cell Hemoglobin Concentration : 32.8 gm/dL  Auto Neutrophil # : x  Auto Lymphocyte # : x  Auto Monocyte # : x  Auto Eosinophil # : x  Auto Basophil # : x  Auto Neutrophil % : x  Auto Lymphocyte % : x  Auto Monocyte % : x  Auto Eosinophil % : x  Auto Basophil % : x      12-18    142  |  106  |  16  ----------------------------<  81  4.1   |  31  |  1.10    Ca    8.8      18 Dec 2019 07:54    TPro  7.0  /  Alb  3.1<L>  /  TBili  0.5  /  DBili  x   /  AST  14<L>  /  ALT  15  /  AlkPhos  96  12-18      CAPILLARY BLOOD GLUCOSE      POCT Blood Glucose.: 148 mg/dL (18 Dec 2019 17:16)  POCT Blood Glucose.: 132 mg/dL (18 Dec 2019 11:58)  POCT Blood Glucose.: 78 mg/dL (18 Dec 2019 07:57)  POCT Blood Glucose.: 227 mg/dL (17 Dec 2019 21:18)      Vital Signs Last 24 Hrs  T(C): 36.6 (18 Dec 2019 13:08), Max: 37.1 (17 Dec 2019 21:09)  T(F): 97.9 (18 Dec 2019 13:08), Max: 98.7 (17 Dec 2019 21:09)  HR: 79 (18 Dec 2019 17:40) (70 - 79)  BP: 175/92 (18 Dec 2019 17:40) (132/68 - 175/92)  BP(mean): --  RR: 18 (18 Dec 2019 13:08) (16 - 18)  SpO2: 94% (18 Dec 2019 13:08) (93% - 96%)              PHYSICAL EXAM:    Constitutional: NAD  HEENT: conjunctive   clear   Neck:  No JVD  Respiratory: CTAB  Cardiovascular: S1 and S2  Gastrointestinal: BS+, soft, NT/ND  Extremities: No peripheral edema  Neurological: A/O x 3, no focal deficits  Psychiatric: Normal mood, normal affect  : No Benitez  Skin: Distal left 2nd digit ulceration noted measuring approximately 1cm x 1cm x 1cm  Access: Not applicable    MEDICATIONS  (STANDING):  atorvastatin 10 milliGRAM(s) Oral at bedtime  azaTHIOprine 50 milliGRAM(s) Oral two times a day  cloNIDine 0.1 milliGRAM(s) Oral every 12 hours  dextrose 5% + lactated ringers. 1000 milliLiter(s) (75 mL/Hr) IV Continuous <Continuous>  dextrose 5%. 1000 milliLiter(s) (50 mL/Hr) IV Continuous <Continuous>  dextrose 50% Injectable 12.5 Gram(s) IV Push once  dextrose 50% Injectable 25 Gram(s) IV Push once  gabapentin 300 milliGRAM(s) Oral two times a day  hydrALAZINE 25 milliGRAM(s) Oral every 8 hours  losartan 25 milliGRAM(s) Oral daily  metoprolol tartrate 25 milliGRAM(s) Oral two times a day  tacrolimus 1.5 milliGRAM(s) Oral two times a day  trimethoprim   80 mG/sulfamethoxazole 400 mG 1 Tablet(s) Oral daily    MEDICATIONS  (PRN):  dextrose 40% Gel 15 Gram(s) Oral once PRN Blood Glucose LESS THAN 70 milliGRAM(s)/deciliter  glucagon  Injectable 1 milliGRAM(s) IntraMuscular once PRN Glucose LESS THAN 70 milligrams/deciliter

## 2019-12-18 NOTE — DISCHARGE NOTE PROVIDER - NSDCMRMEDTOKEN_GEN_ALL_CORE_FT
azaTHIOprine 50 mg oral tablet: 2 tab(s) orally once a day   cloNIDine 0.1 mg oral tablet: 1 tab(s) orally every 12 hours  Cozaar 25 mg oral tablet: 1 tab(s) orally once a day  gabapentin 300 mg oral tablet: 1 tab(s) orally 2 times a day  HumaLOG 100 units/mL subcutaneous solution: Insulin Pump  hydrALAZINE 25 mg oral tablet: 1 tab(s) orally every 8 hours  lovastatin 40 mg oral tablet: 1 tab(s) orally once a day  Metoprolol Tartrate 25 mg oral tablet: 1 tab(s) orally 2 times a day  sulfamethoxazole-trimethoprim 400 mg-80 mg oral tablet: 1 tab(s) orally once a day   tacrolimus 0.5 mg oral capsule: 3 cap(s) orally 2 times a day

## 2019-12-18 NOTE — PROGRESS NOTE ADULT - SUBJECTIVE AND OBJECTIVE BOX
The patient was interviewed and evaluated  54y Male    Vital Signs Last 24 Hrs  T(C): 36.9 (18 Dec 2019 05:41), Max: 37.1 (17 Dec 2019 21:09)  T(F): 98.4 (18 Dec 2019 05:41), Max: 98.7 (17 Dec 2019 21:09)  HR: 70 (18 Dec 2019 05:41) (60 - 72)  BP: 132/68 (18 Dec 2019 05:41) (117/72 - 170/79)  BP(mean): --  RR: 16 (18 Dec 2019 05:41) (14 - 18)  SpO2: 93% (18 Dec 2019 05:41) (93% - 100%)    Pt seen, doing well, no anesthesia complications or complaints noted or reported.   No Nausea    No additional recommendations.     Pain well controlled

## 2019-12-18 NOTE — PROGRESS NOTE ADULT - PROBLEM SELECTOR PLAN 1
POD#1 left 2nd toe partial amputation with podiatry  - continue home daily bactrim at this time due to his immunosuppression due to transplant  - dressings as per podiatry  - Tissue cultures - + gram variable coccobacilli  - F/U bone pathology results  - BCx2 - NGTD  - LLE US doppler for swelling, no signs of DVT  - PT/OT evaluation  - Podiatry, Dr. Catherine following

## 2019-12-18 NOTE — PROGRESS NOTE ADULT - PROBLEM SELECTOR PLAN 4
patient on Insulin pump  - HgbA1C of 7.1  - continue pump with home settings at this time  - Dash/TLC consistent carb diet  - Diabetes nurse educator following  - Endocrinology, Dr. Perlman following

## 2019-12-18 NOTE — PROGRESS NOTE ADULT - PROBLEM SELECTOR PLAN 8
IMPROVE VTE Score: 3    history of b/l DVTs on previous hospitalization 3 years prior  lovenox 40mg subq for DVT prophylaxis started today

## 2019-12-18 NOTE — PROGRESS NOTE ADULT - SUBJECTIVE AND OBJECTIVE BOX
54y year old Male seen at [facility] 3WES 366 W1 s/p left 2nd toe partial amputation. Patient relates no overnight events and states that they are doing well at this time. Denies any fever, chills, nausea, vomiting, chest pain, shortness of breath, or calf pain at this time.      Allergies    No Known Allergies    Intolerances        MEDICATIONS  (STANDING):  atorvastatin 10 milliGRAM(s) Oral at bedtime  azaTHIOprine 50 milliGRAM(s) Oral two times a day  cloNIDine 0.1 milliGRAM(s) Oral every 12 hours  dextrose 5% + lactated ringers. 1000 milliLiter(s) (75 mL/Hr) IV Continuous <Continuous>  dextrose 5%. 1000 milliLiter(s) (50 mL/Hr) IV Continuous <Continuous>  dextrose 50% Injectable 12.5 Gram(s) IV Push once  dextrose 50% Injectable 25 Gram(s) IV Push once  gabapentin 300 milliGRAM(s) Oral two times a day  hydrALAZINE 25 milliGRAM(s) Oral every 8 hours  losartan 25 milliGRAM(s) Oral daily  metoprolol tartrate 25 milliGRAM(s) Oral two times a day  tacrolimus 1.5 milliGRAM(s) Oral two times a day  trimethoprim   80 mG/sulfamethoxazole 400 mG 1 Tablet(s) Oral daily    MEDICATIONS  (PRN):  dextrose 40% Gel 15 Gram(s) Oral once PRN Blood Glucose LESS THAN 70 milliGRAM(s)/deciliter  glucagon  Injectable 1 milliGRAM(s) IntraMuscular once PRN Glucose LESS THAN 70 milligrams/deciliter        Vital Signs Last 24 Hrs  T(C): 36.9 (18 Dec 2019 05:41), Max: 37.1 (17 Dec 2019 21:09)  T(F): 98.4 (18 Dec 2019 05:41), Max: 98.7 (17 Dec 2019 21:09)  HR: 70 (18 Dec 2019 05:41) (60 - 72)  BP: 132/68 (18 Dec 2019 05:41) (117/72 - 170/79)  BP(mean): --  RR: 16 (18 Dec 2019 05:41) (14 - 18)  SpO2: 93% (18 Dec 2019 05:41) (93% - 100%)      PHYSICAL EXAM:  Vascular: DP & PT faintly palpable bilaterally, Capillary refill 3 seconds to all digits bilaterally, No digital hair present bilaterally  Neurological: Loss of protective sensation noted bilaterally  Musculoskeletal: 5/5 strength in all quadrants bilaterally, s/p left partial 2nd digit amputation, s/p left partial 4th ray amputation, AJ & STJ ROM intact  Dermatological: Incision site of the left foot noted with intact sutures, no dehiscence, mild phil-incision erythema, no proximal streaking, no fluctuance, no malodor, no signs of infection at this time.      CBC Full  -  ( 18 Dec 2019 07:54 )  WBC Count : 7.25 K/uL  RBC Count : 4.56 M/uL  Hemoglobin : 13.2 g/dL  Hematocrit : 40.3 %  Platelet Count - Automated : 220 K/uL  Mean Cell Volume : 88.4 fl  Mean Cell Hemoglobin : 28.9 pg  Mean Cell Hemoglobin Concentration : 32.8 gm/dL  Auto Neutrophil # : x  Auto Lymphocyte # : x  Auto Monocyte # : x  Auto Eosinophil # : x  Auto Basophil # : x  Auto Neutrophil % : x  Auto Lymphocyte % : x  Auto Monocyte % : x  Auto Eosinophil % : x  Auto Basophil % : x      12-18    142  |  106  |  16  ----------------------------<  81  4.1   |  31  |  1.10    Ca    8.8      18 Dec 2019 07:54    TPro  7.0  /  Alb  3.1<L>  /  TBili  0.5  /  DBili  x   /  AST  14<L>  /  ALT  15  /  AlkPhos  96  12-18        Culture - Tissue with Gram Stain (collected 17 Dec 2019 22:07)  Source: .Tissue Other, left second toe bone culture  Gram Stain (18 Dec 2019 04:50):    Rare polymorphonuclear leukocytes seen per low power field    Rare Gram Variable Coccobacilli seen per oil power field    Culture - Blood (collected 17 Dec 2019 00:38)  Source: .Blood Blood  Preliminary Report (18 Dec 2019 01:05):    No growth to date.    Culture - Other (collected 16 Dec 2019 22:35)  Source: .Other Left foot  Preliminary Report (17 Dec 2019 21:15):    Few Corynebacterium species "Susceptibilities not performed"          Imaging: Postop 3 view radiographs reveal a partial 2nd digit amputation.

## 2019-12-18 NOTE — PROGRESS NOTE ADULT - ATTENDING COMMENTS
POD#1  Awaiting Pathology
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 Admitted for left 2nd toe osteomyelitis with surgical intervention with podiatry. Plan: cont iv antibx, monitor clninical course, OR for today medically optimized as above intermediate risk candidate for low to intermediater risk procedure, f/u bone path, pain control apprec podiaatry recs, d5lr, apprec endocrine recs for po to npo and viceversa transition with insulin pump

## 2019-12-18 NOTE — PROGRESS NOTE ADULT - SUBJECTIVE AND OBJECTIVE BOX
CAPILLARY BLOOD GLUCOSE      POCT Blood Glucose.: 78 mg/dL (18 Dec 2019 07:57)  POCT Blood Glucose.: 227 mg/dL (17 Dec 2019 21:18)  POCT Blood Glucose.: 108 mg/dL (17 Dec 2019 16:55)  POCT Blood Glucose.: 86 mg/dL (17 Dec 2019 16:28)  POCT Blood Glucose.: 74 mg/dL (17 Dec 2019 16:10)  POCT Blood Glucose.: 79 mg/dL (17 Dec 2019 14:55)  POCT Blood Glucose.: 94 mg/dL (17 Dec 2019 12:04)      Vital Signs Last 24 Hrs  T(C): 36.9 (18 Dec 2019 05:41), Max: 37.1 (17 Dec 2019 21:09)  T(F): 98.4 (18 Dec 2019 05:41), Max: 98.7 (17 Dec 2019 21:09)  HR: 70 (18 Dec 2019 05:41) (60 - 72)  BP: 132/68 (18 Dec 2019 05:41) (117/72 - 170/79)  BP(mean): --  RR: 16 (18 Dec 2019 05:41) (14 - 18)  SpO2: 93% (18 Dec 2019 05:41) (93% - 100%)    General: WN/WD NAD  Respiratory: CTA B/L  CV: RRR, S1S2, no murmurs, rubs or gallops  Abdominal: Soft, NT, ND +BS, Last BM  Extremities: le foot dsg intact    Hemoglobin A1C, Whole Blood: 7.1 % (12-17 @ 10:59)   12-18    142  |  106  |  16  ----------------------------<  81  4.1   |  31  |  1.10    Ca    8.8      18 Dec 2019 07:54    TPro  7.0  /  Alb  3.1<L>  /  TBili  0.5  /  DBili  x   /  AST  14<L>  /  ALT  15  /  AlkPhos  96  12-18      atorvastatin 10 milliGRAM(s) Oral at bedtime  dextrose 40% Gel 15 Gram(s) Oral once PRN  dextrose 50% Injectable 12.5 Gram(s) IV Push once  dextrose 50% Injectable 25 Gram(s) IV Push once  glucagon  Injectable 1 milliGRAM(s) IntraMuscular once PRN

## 2019-12-18 NOTE — PROGRESS NOTE ADULT - SUBJECTIVE AND OBJECTIVE BOX
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 Patient was being seen by podiatry Dr. Pabon for outpatient treatment of ulcer of the tip of the left 2nd toe for the past 3-4 months. Patient is on daily bactrim since 2013 due to his previous kidney transplant. 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 Patient s/p left 4th toe amputation a few years ago, without any post surgical complications. Patient admits to some swelling in the left lower extremity as compared to the right and b/l peripheral neuropathy. Denies any pain in the foot, chills or fever.     In the ED, vitals HR 63, /72, RR 16 Sp02 97% on RA, temp 97.7  1L NS IVF fluid bolus  EKG - NSR@ 58 with 1st degree AV block  Xray of left foot - osteomyelitis of the second terminal tuft (16 Dec 2019 17:37)      ----  INTERVAL HPI/OVERNIGHT EVENTS: Pt seen and evaluated at the bedside. No acute overnight events occurred. Patient denies any patient and is feeling well.     ----  PAST MEDICAL & SURGICAL HISTORY:  CVA (cerebral vascular accident)  Obesity (BMI 30-39.9)  Diabetic peripheral neuropathy  CKD (chronic kidney disease)  Diabetes mellitus  Hyperlipidemia  Hypertension  End-stage renal failure with renal transplant: 12/2013  H/O foot surgery: 2005 - right foot - bone fracture - s/p ORIF and subsequent removal of hardware  Vasectomy status: s/p b/l  vasectomy  Ear disease: Left inner ear surgery  Toe infection: 2007 L 4th Toe surgery-amputation secondary to osteomyelitis      FAMILY HISTORY:  No pertinent family history in first degree relatives      Allergies    No Known Allergies    Intolerances        ----  REVIEW OF SYSTEMS:  CONSTITUTIONAL: denies fever, chills, fatigue, weakness  HEENT: denies blurred vision, sore throat  SKIN: denies new lesions, rash  CARDIOVASCULAR: denies chest pain, chest pressure, palpitations  RESPIRATORY: denies shortness of breath, sputum production  GASTROINTESTINAL: denies nausea, vomiting, diarrhea, abdominal pain  GENITOURINARY: denies dysuria, discharge  NEUROLOGICAL: denies numbness, headache, focal weakness  MUSCULOSKELETAL: denies new joint pain, muscle aches  HEMATOLOGIC: denies gross bleeding, bruising  LYMPHATICS: denies enlarged lymph nodes, extremity swelling  PSYCHIATRIC: denies recent changes in anxiety, depression  ENDOCRINOLOGIC: denies sweating, cold or heat intolerance    ----  PHYSICAL EXAM:  GENERAL: patient appears well, no acute distress, appropriately interactive  EYES: sclera clear, no exudates  NECK: supple, soft, no thyromegaly noted  LUNGS: good air entry bilaterally, clear to auscultation, symmetric breath sounds, no wheezing or rhonchi appreciated  HEART: soft S1/S2, regular rate and rhythm, no murmurs noted, no noted edema to b/l LE  GASTROINTESTINAL: abdomen is soft, nontender, nondistended, normoactive bowel sounds, no palpable masses  MUSCULOSKELETAL: no clubbing or cyanosis, no obvious deformity  NEUROLOGIC: awake, alert, oriented x3, good muscle tone in 4 extremities, no obvious sensory deficits  PSYCHIATRIC: mood is good, affect is congruent with mood, linear and logical thought process    T(C): 36.6 (12-18-19 @ 13:08), Max: 37.1 (12-17-19 @ 21:09)  HR: 75 (12-18-19 @ 13:08) (60 - 75)  BP: 151/80 (12-18-19 @ 13:08) (132/68 - 170/79)  RR: 18 (12-18-19 @ 13:08) (14 - 18)  SpO2: 94% (12-18-19 @ 13:08) (93% - 100%)  Wt(kg): --    ----  I&O's Summary    17 Dec 2019 07:01  -  18 Dec 2019 07:00  --------------------------------------------------------  IN: 2160 mL / OUT: 600 mL / NET: 1560 mL    18 Dec 2019 07:01  -  18 Dec 2019 14:01  --------------------------------------------------------  IN: 350 mL / OUT: 1600 mL / NET: -1250 mL        LABS:                        13.2   7.25  )-----------( 220      ( 18 Dec 2019 07:54 )             40.3     12-18    142  |  106  |  16  ----------------------------<  81  4.1   |  31  |  1.10    Ca    8.8      18 Dec 2019 07:54    TPro  7.0  /  Alb  3.1<L>  /  TBili  0.5  /  DBili  x   /  AST  14<L>  /  ALT  15  /  AlkPhos  96  12-18        CAPILLARY BLOOD GLUCOSE      POCT Blood Glucose.: 132 mg/dL (18 Dec 2019 11:58)  POCT Blood Glucose.: 78 mg/dL (18 Dec 2019 07:57)  POCT Blood Glucose.: 227 mg/dL (17 Dec 2019 21:18)  POCT Blood Glucose.: 108 mg/dL (17 Dec 2019 16:55)  POCT Blood Glucose.: 86 mg/dL (17 Dec 2019 16:28)  POCT Blood Glucose.: 74 mg/dL (17 Dec 2019 16:10)  POCT Blood Glucose.: 79 mg/dL (17 Dec 2019 14:55)      12-17 @ 09:23   No growth to date.  --  --  12-17 @ 00:38   No growth to date.  --  --  12-16 @ 22:35   Few Corynebacterium species "Susceptibilities not performed"  --  --          Culture - Tissue with Gram Stain (collected 12-17-19 @ 22:07)  Source: .Tissue Other, left second toe bone culture  Gram Stain (12-18-19 @ 04:50):    Rare polymorphonuclear leukocytes seen per low power field    Rare Gram Variable Coccobacilli seen per oil power field        ----  Personally reviewed:  Vital sign trends: [ x ] yes    [  ] no     [  ] n/a  Laboratory results: [ x ] yes    [  ] no     [  ] n/a  Radiology results: [ x ] yes    [  ] no     [  ] n/a  Culture results: [ x ] yes    [  ] no     [  ] n/a  Consultant recommendations: [ x ] yes    [  ] no     [  ] n/a

## 2019-12-18 NOTE — DISCHARGE NOTE PROVIDER - NSDCCPCAREPLAN_GEN_ALL_CORE_FT
PRINCIPAL DISCHARGE DIAGNOSIS  Diagnosis: Toe osteomyelitis, left  Assessment and Plan of Treatment: - You had a partial amputation of your left second toe  - You were continued on your bactrim with out any additional antibiotics  - You are to follow up with Dr. Catherine at the wound care center with in one week of discharge  - note: patient responsible for making a attending all outpatient appointments PRINCIPAL DISCHARGE DIAGNOSIS  Diagnosis: Toe osteomyelitis, left  Assessment and Plan of Treatment: - You had a partial amputation of your left second toe  - Keep the dressings clean dry and intact until follow up appointment  - You were continued on your bactrim with out any additional antibiotics  - You are to follow up with Dr. Catherine at the wound care center with in 1 week  - note: patient responsible for making a attending all outpatient appointments

## 2019-12-18 NOTE — DISCHARGE NOTE PROVIDER - CARE PROVIDER_API CALL
Kendall Araiza (DPM)  Podiatric Medicine and Surgery  [address] [place] [address]  Phone: [phone]  Fax: [phone]  Follow Up Time:

## 2019-12-18 NOTE — PROGRESS NOTE ADULT - PROBLEM SELECTOR PLAN 1
Patient examined and evaluated at this time.  Discussed etiology of symptoms and all questions answered to patient and wife's satisfaction.  Surgical site dressed with adaptic and dry, sterile dressing.  OR cultures pending at this time.  Antibiotics as per ID recommendations and medical team.  Will monitor daily.

## 2019-12-18 NOTE — PROGRESS NOTE ADULT - ASSESSMENT
The patient is a 54y Male with past medical history , End-stage renal failure with renal transplant  12/2013 living donor from sister in law CKD (chronic kidney disease)  , CVA (cerebral vascular accident)  , Diabetes mellitus  , Diabetic peripheral neuropathy  , Hyperlipidemia  , Hypertension  , Obesity (BMI 30-39.9) , Ear disease  Left inner ear surgery  , H/O foot surgery  2005 - right foot - bone fracture - s/p ORIF and subsequent removal of hardware  Toe infection  2007 L 4th Toe surgery-amputation secondary to osteomyelitis  , Vasectomy status  s/p b/l  vasectomy complaining of foot pain/injury.  Referred by Dr. Marin podiatrist for left 2nd toe osteomyelitis.  h/ o left 2nd toe infection x 4 months   pmd- [facility] in [place].  pt has insulin pump.

## 2019-12-18 NOTE — PROGRESS NOTE ADULT - PROBLEM SELECTOR PLAN 2
s/p right renal transplant in 2013  - cr. of 1.10 at baseline  - continue home tacrolimus and azathioprine for antirejection meds  - avoid nephrotoxic meds  - Nephrology, Dr. Hilton consulted

## 2019-12-19 ENCOUNTER — TRANSCRIPTION ENCOUNTER (OUTPATIENT)
Age: 54
End: 2019-12-19

## 2019-12-19 VITALS
DIASTOLIC BLOOD PRESSURE: 81 MMHG | RESPIRATION RATE: 17 BRPM | HEART RATE: 63 BPM | TEMPERATURE: 98 F | OXYGEN SATURATION: 97 % | SYSTOLIC BLOOD PRESSURE: 155 MMHG

## 2019-12-19 LAB
ANION GAP SERPL CALC-SCNC: 6 MMOL/L — SIGNIFICANT CHANGE UP (ref 5–17)
BUN SERPL-MCNC: 19 MG/DL — SIGNIFICANT CHANGE UP (ref 7–23)
CALCIUM SERPL-MCNC: 9.3 MG/DL — SIGNIFICANT CHANGE UP (ref 8.5–10.1)
CHLORIDE SERPL-SCNC: 105 MMOL/L — SIGNIFICANT CHANGE UP (ref 96–108)
CO2 SERPL-SCNC: 30 MMOL/L — SIGNIFICANT CHANGE UP (ref 22–31)
CREAT SERPL-MCNC: 1.1 MG/DL — SIGNIFICANT CHANGE UP (ref 0.5–1.3)
GLUCOSE SERPL-MCNC: 55 MG/DL — LOW (ref 70–99)
HCT VFR BLD CALC: 43.8 % — SIGNIFICANT CHANGE UP (ref 39–50)
HGB BLD-MCNC: 14.3 G/DL — SIGNIFICANT CHANGE UP (ref 13–17)
MCHC RBC-ENTMCNC: 28.9 PG — SIGNIFICANT CHANGE UP (ref 27–34)
MCHC RBC-ENTMCNC: 32.6 GM/DL — SIGNIFICANT CHANGE UP (ref 32–36)
MCV RBC AUTO: 88.5 FL — SIGNIFICANT CHANGE UP (ref 80–100)
NRBC # BLD: 0 /100 WBCS — SIGNIFICANT CHANGE UP (ref 0–0)
PLATELET # BLD AUTO: 212 K/UL — SIGNIFICANT CHANGE UP (ref 150–400)
POTASSIUM SERPL-MCNC: 3.6 MMOL/L — SIGNIFICANT CHANGE UP (ref 3.5–5.3)
POTASSIUM SERPL-SCNC: 3.6 MMOL/L — SIGNIFICANT CHANGE UP (ref 3.5–5.3)
RBC # BLD: 4.95 M/UL — SIGNIFICANT CHANGE UP (ref 4.2–5.8)
RBC # FLD: 13.5 % — SIGNIFICANT CHANGE UP (ref 10.3–14.5)
SODIUM SERPL-SCNC: 141 MMOL/L — SIGNIFICANT CHANGE UP (ref 135–145)
WBC # BLD: 6.34 K/UL — SIGNIFICANT CHANGE UP (ref 3.8–10.5)
WBC # FLD AUTO: 6.34 K/UL — SIGNIFICANT CHANGE UP (ref 3.8–10.5)

## 2019-12-19 PROCEDURE — 86140 C-REACTIVE PROTEIN: CPT

## 2019-12-19 PROCEDURE — 85027 COMPLETE CBC AUTOMATED: CPT

## 2019-12-19 PROCEDURE — 87040 BLOOD CULTURE FOR BACTERIA: CPT

## 2019-12-19 PROCEDURE — 80048 BASIC METABOLIC PNL TOTAL CA: CPT

## 2019-12-19 PROCEDURE — 93005 ELECTROCARDIOGRAM TRACING: CPT

## 2019-12-19 PROCEDURE — 87070 CULTURE OTHR SPECIMN AEROBIC: CPT

## 2019-12-19 PROCEDURE — 88304 TISSUE EXAM BY PATHOLOGIST: CPT

## 2019-12-19 PROCEDURE — 88311 DECALCIFY TISSUE: CPT

## 2019-12-19 PROCEDURE — 97116 GAIT TRAINING THERAPY: CPT

## 2019-12-19 PROCEDURE — 97161 PT EVAL LOW COMPLEX 20 MIN: CPT

## 2019-12-19 PROCEDURE — 96360 HYDRATION IV INFUSION INIT: CPT

## 2019-12-19 PROCEDURE — 82962 GLUCOSE BLOOD TEST: CPT

## 2019-12-19 PROCEDURE — 93970 EXTREMITY STUDY: CPT

## 2019-12-19 PROCEDURE — 97530 THERAPEUTIC ACTIVITIES: CPT

## 2019-12-19 PROCEDURE — 80053 COMPREHEN METABOLIC PANEL: CPT

## 2019-12-19 PROCEDURE — 73630 X-RAY EXAM OF FOOT: CPT

## 2019-12-19 PROCEDURE — 99239 HOSP IP/OBS DSCHRG MGMT >30: CPT

## 2019-12-19 PROCEDURE — 73660 X-RAY EXAM OF TOE(S): CPT

## 2019-12-19 PROCEDURE — 88305 TISSUE EXAM BY PATHOLOGIST: CPT

## 2019-12-19 PROCEDURE — 99285 EMERGENCY DEPT VISIT HI MDM: CPT | Mod: 25

## 2019-12-19 PROCEDURE — 36415 COLL VENOUS BLD VENIPUNCTURE: CPT

## 2019-12-19 PROCEDURE — 85652 RBC SED RATE AUTOMATED: CPT

## 2019-12-19 PROCEDURE — 83036 HEMOGLOBIN GLYCOSYLATED A1C: CPT

## 2019-12-19 RX ORDER — GLUCAGON INJECTION, SOLUTION 0.5 MG/.1ML
1 INJECTION, SOLUTION SUBCUTANEOUS ONCE
Refills: 0 | Status: DISCONTINUED | OUTPATIENT
Start: 2019-12-19 | End: 2019-12-19

## 2019-12-19 RX ORDER — DEXTROSE 50 % IN WATER 50 %
12.5 SYRINGE (ML) INTRAVENOUS ONCE
Refills: 0 | Status: DISCONTINUED | OUTPATIENT
Start: 2019-12-19 | End: 2019-12-19

## 2019-12-19 RX ORDER — INSULIN LISPRO 100/ML
1 VIAL (ML) SUBCUTANEOUS
Refills: 0 | Status: DISCONTINUED | OUTPATIENT
Start: 2019-12-19 | End: 2019-12-19

## 2019-12-19 RX ORDER — SODIUM CHLORIDE 9 MG/ML
1000 INJECTION, SOLUTION INTRAVENOUS
Refills: 0 | Status: DISCONTINUED | OUTPATIENT
Start: 2019-12-19 | End: 2019-12-19

## 2019-12-19 RX ORDER — DEXTROSE 50 % IN WATER 50 %
15 SYRINGE (ML) INTRAVENOUS ONCE
Refills: 0 | Status: DISCONTINUED | OUTPATIENT
Start: 2019-12-19 | End: 2019-12-19

## 2019-12-19 RX ORDER — DEXTROSE 50 % IN WATER 50 %
25 SYRINGE (ML) INTRAVENOUS ONCE
Refills: 0 | Status: DISCONTINUED | OUTPATIENT
Start: 2019-12-19 | End: 2019-12-19

## 2019-12-19 RX ADMIN — ENOXAPARIN SODIUM 40 MILLIGRAM(S): 100 INJECTION SUBCUTANEOUS at 15:22

## 2019-12-19 RX ADMIN — Medication 25 MILLIGRAM(S): at 13:57

## 2019-12-19 RX ADMIN — Medication 1 TABLET(S): at 11:44

## 2019-12-19 RX ADMIN — Medication 0.1 MILLIGRAM(S): at 05:56

## 2019-12-19 RX ADMIN — ENOXAPARIN SODIUM 40 MILLIGRAM(S): 100 INJECTION SUBCUTANEOUS at 03:01

## 2019-12-19 RX ADMIN — TACROLIMUS 1.5 MILLIGRAM(S): 5 CAPSULE ORAL at 09:00

## 2019-12-19 RX ADMIN — Medication 1 EACH: at 15:13

## 2019-12-19 RX ADMIN — AZATHIOPRINE 50 MILLIGRAM(S): 100 TABLET ORAL at 09:00

## 2019-12-19 RX ADMIN — Medication 25 MILLIGRAM(S): at 05:56

## 2019-12-19 RX ADMIN — GABAPENTIN 300 MILLIGRAM(S): 400 CAPSULE ORAL at 05:55

## 2019-12-19 RX ADMIN — LOSARTAN POTASSIUM 25 MILLIGRAM(S): 100 TABLET, FILM COATED ORAL at 05:56

## 2019-12-19 NOTE — PROGRESS NOTE ADULT - SUBJECTIVE AND OBJECTIVE BOX
54y year old Male seen at [facility] bedside and is s/p left 2nd toe partial amputation. Patient was resting comfortably in chair and in NAD. Patient denies any over night events and denies n/v/f/c.     Allergies    No Known Allergies    Intolerances        MEDICATIONS  (STANDING):  atorvastatin 10 milliGRAM(s) Oral at bedtime  azaTHIOprine 50 milliGRAM(s) Oral two times a day  cloNIDine 0.1 milliGRAM(s) Oral every 12 hours  dextrose 5% + lactated ringers. 1000 milliLiter(s) (75 mL/Hr) IV Continuous <Continuous>  dextrose 5%. 1000 milliLiter(s) (50 mL/Hr) IV Continuous <Continuous>  dextrose 50% Injectable 12.5 Gram(s) IV Push once  dextrose 50% Injectable 25 Gram(s) IV Push once  gabapentin 300 milliGRAM(s) Oral two times a day  hydrALAZINE 25 milliGRAM(s) Oral every 8 hours  losartan 25 milliGRAM(s) Oral daily  metoprolol tartrate 25 milliGRAM(s) Oral two times a day  tacrolimus 1.5 milliGRAM(s) Oral two times a day  trimethoprim   80 mG/sulfamethoxazole 400 mG 1 Tablet(s) Oral daily    MEDICATIONS  (PRN):  dextrose 40% Gel 15 Gram(s) Oral once PRN Blood Glucose LESS THAN 70 milliGRAM(s)/deciliter  glucagon  Injectable 1 milliGRAM(s) IntraMuscular once PRN Glucose LESS THAN 70 milligrams/deciliter      Vital Signs Last 24 Hrs  T(C): 36.7 (19 Dec 2019 13:20), Max: 37.4 (18 Dec 2019 21:12)  T(F): 98.1 (19 Dec 2019 13:20), Max: 99.3 (18 Dec 2019 21:12)  HR: 63 (19 Dec 2019 13:20) (63 - 79)  BP: 155/81 (19 Dec 2019 13:20) (145/76 - 175/92)  BP(mean): --  RR: 17 (19 Dec 2019 13:20) (17 - 17)  SpO2: 97% (19 Dec 2019 13:20) (93% - 97%)    PHYSICAL EXAM:  Vascular: DP & PT faintly palpable bilaterally, Capillary refill 3 seconds to all digits bilaterally, No digital hair present bilaterally  Neurological: Loss of protective sensation noted bilaterally  Musculoskeletal: 5/5 strength in all quadrants bilaterally, s/p left partial 2nd digit amputation, s/p left partial 4th ray amputation, AJ & STJ ROM intact  Dermatological: Incision site of the left foot noted with intact sutures, no dehiscence, mild phil-incision erythema, no proximal streaking, no fluctuance, no malodor, no signs of infection at this time.                          14.3   6.34  )-----------( 212      ( 19 Dec 2019 08:50 )             43.8     12-19    141  |  105  |  19  ----------------------------<  55<L>  3.6   |  30  |  1.10    Ca    9.3      19 Dec 2019 08:50    TPro  7.0  /  Alb  3.1<L>  /  TBili  0.5  /  DBili  x   /  AST  14<L>  /  ALT  15  /  AlkPhos  96  12-18    Culture - Tissue with Gram Stain (12.17.19 @ 22:07)    Gram Stain:   Rare polymorphonuclear leukocytes seen per low power field  Rare Gram Variable Coccobacilli seen per oil power field    Specimen Source: .Tissue Other, left second toe bone culture    Culture Results:   No growth        Imaging: Postop 3 view radiographs reveal a partial 2nd digit amputation.

## 2019-12-19 NOTE — PROGRESS NOTE ADULT - ASSESSMENT
The patient is a 54y Male with past medical history , End-stage renal failure with renal transplant  12/2013 living donor from sister in law CKD (chronic kidney disease)  , CVA (cerebral vascular accident)  , Diabetes mellitus  , Diabetic peripheral neuropathy  , Hyperlipidemia  , Hypertension  , Obesity (BMI 30-39.9) , Ear disease  Left inner ear surgery  , H/O foot surgery  2005 - right foot - bone fracture - s/p ORIF and subsequent removal of hardware  Toe infection  2007 L 4th Toe surgery-amputation secondary to osteomyelitis  , Vasectomy status  s/p b/l  vasectomy complaining of foot pain/injury.  Referred by Dr. Marin podiatrist for left 2nd toe osteomyelitis.  h/ o left 2nd toe infection x 4 months   pmd- [facility] in [place].  pt has insulin pump.

## 2019-12-19 NOTE — PROVIDER CONTACT NOTE (OTHER) - BACKGROUND
Pt admitted for osteomyelitis. S/P L 2nd digit partial amputation on 12/17. H/O DM type 1, CVA, HTN, Kidney transplant, CKD/.

## 2019-12-19 NOTE — PROGRESS NOTE ADULT - PROBLEM SELECTOR PLAN 1
temp basal 90% x 24 hrs  cont bolus settings on insulin pump  goal bg 100-180 in hosp setting  cont cons cho diet

## 2019-12-19 NOTE — PROGRESS NOTE ADULT - PROBLEM SELECTOR PLAN 4
patient on Insulin pump  - HgbA1C of 7.1  - continue pump with home settings at this time  - episode of hypoglycemia this morning - corrected with juice, endocrinology to evaluate pump settings  - Dash/TLC consistent carb diet  - Diabetes nurse educator following  - Endocrinology, Dr. Perlman following

## 2019-12-19 NOTE — PROGRESS NOTE ADULT - PROBLEM SELECTOR PROBLEM 1
Diabetes mellitus type 1, uncontrolled
Diabetes mellitus type 1, uncontrolled
Toe osteomyelitis, left

## 2019-12-19 NOTE — PROGRESS NOTE ADULT - SUBJECTIVE AND OBJECTIVE BOX
CAPILLARY BLOOD GLUCOSE      POCT Blood Glucose.: 114 mg/dL (19 Dec 2019 12:19)  POCT Blood Glucose.: 128 mg/dL (19 Dec 2019 08:55)  POCT Blood Glucose.: 77 mg/dL (19 Dec 2019 08:36)  POCT Blood Glucose.: 61 mg/dL (19 Dec 2019 08:19)  POCT Blood Glucose.: 61 mg/dL (19 Dec 2019 08:17)  POCT Blood Glucose.: 247 mg/dL (18 Dec 2019 21:21)  POCT Blood Glucose.: 148 mg/dL (18 Dec 2019 17:16)      Vital Signs Last 24 Hrs  T(C): 36.4 (19 Dec 2019 05:39), Max: 37.4 (18 Dec 2019 21:12)  T(F): 97.5 (19 Dec 2019 05:39), Max: 99.3 (18 Dec 2019 21:12)  HR: 64 (19 Dec 2019 05:39) (64 - 79)  BP: 145/76 (19 Dec 2019 05:39) (145/76 - 175/92)  BP(mean): --  RR: 17 (19 Dec 2019 05:39) (17 - 18)  SpO2: 96% (19 Dec 2019 05:39) (93% - 96%)    General: WN/WD NAD  Respiratory: CTA B/L  CV: RRR, S1S2, no murmurs, rubs or gallops  Abdominal: Soft, NT, ND +BS, Last BM  Extremities: left toe dsg intact    Hemoglobin A1C, Whole Blood: 7.1 % (12-17 @ 10:59)   12-19    141  |  105  |  19  ----------------------------<  55<L>  3.6   |  30  |  1.10    Ca    9.3      19 Dec 2019 08:50    TPro  7.0  /  Alb  3.1<L>  /  TBili  0.5  /  DBili  x   /  AST  14<L>  /  ALT  15  /  AlkPhos  96  12-18      atorvastatin 10 milliGRAM(s) Oral at bedtime  dextrose 40% Gel 15 Gram(s) Oral once PRN  dextrose 40% Gel 15 Gram(s) Oral once PRN  dextrose 50% Injectable 12.5 Gram(s) IV Push once  dextrose 50% Injectable 25 Gram(s) IV Push once  dextrose 50% Injectable 25 Gram(s) IV Push once  dextrose 50% Injectable 12.5 Gram(s) IV Push once  dextrose 50% Injectable 25 Gram(s) IV Push once  glucagon  Injectable 1 milliGRAM(s) IntraMuscular once PRN  glucagon  Injectable 1 milliGRAM(s) IntraMuscular once PRN  insulin lispro (HumaLOG) Pump 1 Each SubCutaneous Continuous Pump

## 2019-12-19 NOTE — PROGRESS NOTE ADULT - SUBJECTIVE AND OBJECTIVE BOX
Patient is a 54y Male whom presented to the hospital with  End-stage renal failure with s/p  renal transplant  12/2013 living donor from sister in law  pateint seen and examined nad , no fever , no chills    PAST MEDICAL & SURGICAL HISTORY:  CVA (cerebral vascular accident)  Obesity (BMI 30-39.9)  Diabetic peripheral neuropathy  CKD (chronic kidney disease)  Diabetes mellitus  Hyperlipidemia  Hypertension  End-stage renal failure with renal transplant: 12/2013  H/O foot surgery: 2005 - right foot - bone fracture - s/p ORIF and subsequent removal of hardware  Vasectomy status: s/p b/l  vasectomy  Ear disease: Left inner ear surgery  Toe infection: 2007 L 4th Toe surgery-amputation secondary to osteomyelitis      MEDICATIONS  (STANDING):      Allergies    No Known Allergies    Intolerances        SOCIAL HISTORY:  Denies ETOh,Smoking,     FAMILY HISTORY:  No pertinent family history in first degree relatives      REVIEW OF SYSTEMS:    CONSTITUTIONAL: No weakness, fevers or chills  EYES/ENT: No visual changes;  no throat pain   NECK: No pain or stiffness  RESPIRATORY: No cough, wheezing, hemoptysis; No shortness of breath  CARDIOVASCULAR: No chest pain or palpitations  GASTROINTESTINAL: No abdominal or epigastric pain. No nausea, vomiting,     No diarrhea or constipation. No melena   GENITOURINARY: No dysuria, frequency or hematuria  NEUROLOGICAL: No numbness or weakness  SKIN: dry  Home Medications:   * Incomplete Medication History as of 16-Dec-2019 14:23 documented in Structured Notes  · 	sulfamethoxazole-trimethoprim 400 mg-80 mg oral tablet: 1 tab(s) orally once a day   · 	azaTHIOprine 50 mg oral tablet: Last Dose Taken:  , 2 tab(s) orally once a day via peg  · 	famotidine 20 mg oral tablet: 1 tab(s) orally 2 times a day  · 	Combivent Respimat CFC free 20 mcg-100 mcg/inh inhalation aerosol: Last Dose Taken:  , 1 puff(s) inhaled 4 times a day, As Needed  · 	tacrolimus 1 mg oral capsule: 2 cap(s) orally 2 times a day  · 	senna 8.8 mg/5 mL oral syrup: 5 milliliter(s) orally once a day  · 	polyethylene glycol 3350 oral powder for reconstitution: 17 gram(s) orally 2 times a day  · 	hydrALAZINE 25 mg oral tablet: 1 tab(s) orally every 8 hours  · 	cloNIDine 0.1 mg oral tablet: Last Dose Taken:  , 1 tab(s) orally every 12 hours  · 	apixaban 5 mg oral tablet: Last Dose Taken:  , 2 tab(s) orally 2 times a day for 7 days until 5/17  · 	apixaban 5 mg oral tablet: Last Dose Taken:  , 1 tab(s) orally 2 times a day  · 	insulin glargine 100 units/mL subcutaneous solution: 25 unit(s) subcutaneous once a day (in the morning)  · 	insulin glargine 100 units/mL subcutaneous solution: 25 unit(s) subcutaneous once a day (at bedtime)  · 	insulin lispro 100 units/mL subcutaneous solution: 12 unit(s) subcutaneous 3 times a day (before meals)  · 	atorvastatin 20 mg oral tablet: Last Dose Taken:  , 1 tab(s) orally once a day (at bedtime)  · 	aspirin 81 mg oral delayed release tablet: Last Dose Taken:  , 1 tab(s) orally once a day  · 	metoprolol tartrate 75 mg oral tablet: 1 tab(s) orally 2 times a day                                                             14.3   6.34  )-----------( 212      ( 19 Dec 2019 08:50 )             43.8       CBC Full  -  ( 19 Dec 2019 08:50 )  WBC Count : 6.34 K/uL  RBC Count : 4.95 M/uL  Hemoglobin : 14.3 g/dL  Hematocrit : 43.8 %  Platelet Count - Automated : 212 K/uL  Mean Cell Volume : 88.5 fl  Mean Cell Hemoglobin : 28.9 pg  Mean Cell Hemoglobin Concentration : 32.6 gm/dL  Auto Neutrophil # : x  Auto Lymphocyte # : x  Auto Monocyte # : x  Auto Eosinophil # : x  Auto Basophil # : x  Auto Neutrophil % : x  Auto Lymphocyte % : x  Auto Monocyte % : x  Auto Eosinophil % : x  Auto Basophil % : x      12-19    141  |  105  |  19  ----------------------------<  55<L>  3.6   |  30  |  1.10    Ca    9.3      19 Dec 2019 08:50    TPro  7.0  /  Alb  3.1<L>  /  TBili  0.5  /  DBili  x   /  AST  14<L>  /  ALT  15  /  AlkPhos  96  12-18      CAPILLARY BLOOD GLUCOSE      POCT Blood Glucose.: 159 mg/dL (19 Dec 2019 17:13)  POCT Blood Glucose.: 114 mg/dL (19 Dec 2019 12:19)  POCT Blood Glucose.: 128 mg/dL (19 Dec 2019 08:55)  POCT Blood Glucose.: 77 mg/dL (19 Dec 2019 08:36)  POCT Blood Glucose.: 61 mg/dL (19 Dec 2019 08:19)  POCT Blood Glucose.: 61 mg/dL (19 Dec 2019 08:17)  POCT Blood Glucose.: 247 mg/dL (18 Dec 2019 21:21)      Vital Signs Last 24 Hrs  T(C): 36.7 (19 Dec 2019 13:20), Max: 37.4 (18 Dec 2019 21:12)  T(F): 98.1 (19 Dec 2019 13:20), Max: 99.3 (18 Dec 2019 21:12)  HR: 63 (19 Dec 2019 13:20) (63 - 66)  BP: 155/81 (19 Dec 2019 13:20) (145/76 - 155/81)  BP(mean): --  RR: 17 (19 Dec 2019 13:20) (17 - 17)  SpO2: 97% (19 Dec 2019 13:20) (93% - 97%)            PHYSICAL EXAM:    Constitutional: NAD  HEENT: conjunctive   clear   Neck:  No JVD  Respiratory: CTAB  Cardiovascular: S1 and S2  Gastrointestinal: BS+, soft, NT/ND  Extremities: No peripheral edema  Neurological: A/O x 3, no focal deficits  Psychiatric: Normal mood, normal affect  : No Benitez  Skin: Distal left 2nd digit ulceration noted measuring approximately 1cm x 1cm x 1cm  Access: Not applicable    MEDICATIONS  (STANDING):  atorvastatin 10 milliGRAM(s) Oral at bedtime  azaTHIOprine 50 milliGRAM(s) Oral two times a day  cloNIDine 0.1 milliGRAM(s) Oral every 12 hours  dextrose 5% + lactated ringers. 1000 milliLiter(s) (75 mL/Hr) IV Continuous <Continuous>  dextrose 5%. 1000 milliLiter(s) (50 mL/Hr) IV Continuous <Continuous>  dextrose 50% Injectable 12.5 Gram(s) IV Push once  dextrose 50% Injectable 25 Gram(s) IV Push once  gabapentin 300 milliGRAM(s) Oral two times a day  hydrALAZINE 25 milliGRAM(s) Oral every 8 hours  losartan 25 milliGRAM(s) Oral daily  metoprolol tartrate 25 milliGRAM(s) Oral two times a day  tacrolimus 1.5 milliGRAM(s) Oral two times a day  trimethoprim   80 mG/sulfamethoxazole 400 mG 1 Tablet(s) Oral daily    MEDICATIONS  (PRN):  dextrose 40% Gel 15 Gram(s) Oral once PRN Blood Glucose LESS THAN 70 milliGRAM(s)/deciliter  glucagon  Injectable 1 milliGRAM(s) IntraMuscular once PRN Glucose LESS THAN 70 milligrams/deciliter Patient is a 54y Male whom presented to the hospital with  End-stage renal failure with s/p  renal transplant  12/2013 living donor from sister in law  pateint seen and examined nad this am at 10 am  , no fever , no chills    PAST MEDICAL & SURGICAL HISTORY:  CVA (cerebral vascular accident)  Obesity (BMI 30-39.9)  Diabetic peripheral neuropathy  CKD (chronic kidney disease)  Diabetes mellitus  Hyperlipidemia  Hypertension  End-stage renal failure with renal transplant: 12/2013  H/O foot surgery: 2005 - right foot - bone fracture - s/p ORIF and subsequent removal of hardware  Vasectomy status: s/p b/l  vasectomy  Ear disease: Left inner ear surgery  Toe infection: 2007 L 4th Toe surgery-amputation secondary to osteomyelitis      MEDICATIONS  (STANDING):      Allergies    No Known Allergies    Intolerances        SOCIAL HISTORY:  Denies ETOh,Smoking,     FAMILY HISTORY:  No pertinent family history in first degree relatives      REVIEW OF SYSTEMS:    CONSTITUTIONAL: No weakness, fevers or chills  EYES/ENT: No visual changes;  no throat pain   NECK: No pain or stiffness  RESPIRATORY: No cough, wheezing, hemoptysis; No shortness of breath  CARDIOVASCULAR: No chest pain or palpitations  GASTROINTESTINAL: No abdominal or epigastric pain. No nausea, vomiting,     No diarrhea or constipation. No melena   GENITOURINARY: No dysuria, frequency or hematuria  NEUROLOGICAL: No numbness or weakness  SKIN: dry  Home Medications:   * Incomplete Medication History as of 16-Dec-2019 14:23 documented in Structured Notes  · 	sulfamethoxazole-trimethoprim 400 mg-80 mg oral tablet: 1 tab(s) orally once a day   · 	azaTHIOprine 50 mg oral tablet: Last Dose Taken:  , 2 tab(s) orally once a day via peg  · 	famotidine 20 mg oral tablet: 1 tab(s) orally 2 times a day  · 	Combivent Respimat CFC free 20 mcg-100 mcg/inh inhalation aerosol: Last Dose Taken:  , 1 puff(s) inhaled 4 times a day, As Needed  · 	tacrolimus 1 mg oral capsule: 2 cap(s) orally 2 times a day  · 	senna 8.8 mg/5 mL oral syrup: 5 milliliter(s) orally once a day  · 	polyethylene glycol 3350 oral powder for reconstitution: 17 gram(s) orally 2 times a day  · 	hydrALAZINE 25 mg oral tablet: 1 tab(s) orally every 8 hours  · 	cloNIDine 0.1 mg oral tablet: Last Dose Taken:  , 1 tab(s) orally every 12 hours  · 	apixaban 5 mg oral tablet: Last Dose Taken:  , 2 tab(s) orally 2 times a day for 7 days until 5/17  · 	apixaban 5 mg oral tablet: Last Dose Taken:  , 1 tab(s) orally 2 times a day  · 	insulin glargine 100 units/mL subcutaneous solution: 25 unit(s) subcutaneous once a day (in the morning)  · 	insulin glargine 100 units/mL subcutaneous solution: 25 unit(s) subcutaneous once a day (at bedtime)  · 	insulin lispro 100 units/mL subcutaneous solution: 12 unit(s) subcutaneous 3 times a day (before meals)  · 	atorvastatin 20 mg oral tablet: Last Dose Taken:  , 1 tab(s) orally once a day (at bedtime)  · 	aspirin 81 mg oral delayed release tablet: Last Dose Taken:  , 1 tab(s) orally once a day  · 	metoprolol tartrate 75 mg oral tablet: 1 tab(s) orally 2 times a day                                                             14.3   6.34  )-----------( 212      ( 19 Dec 2019 08:50 )             43.8       CBC Full  -  ( 19 Dec 2019 08:50 )  WBC Count : 6.34 K/uL  RBC Count : 4.95 M/uL  Hemoglobin : 14.3 g/dL  Hematocrit : 43.8 %  Platelet Count - Automated : 212 K/uL  Mean Cell Volume : 88.5 fl  Mean Cell Hemoglobin : 28.9 pg  Mean Cell Hemoglobin Concentration : 32.6 gm/dL  Auto Neutrophil # : x  Auto Lymphocyte # : x  Auto Monocyte # : x  Auto Eosinophil # : x  Auto Basophil # : x  Auto Neutrophil % : x  Auto Lymphocyte % : x  Auto Monocyte % : x  Auto Eosinophil % : x  Auto Basophil % : x      12-19    141  |  105  |  19  ----------------------------<  55<L>  3.6   |  30  |  1.10    Ca    9.3      19 Dec 2019 08:50    TPro  7.0  /  Alb  3.1<L>  /  TBili  0.5  /  DBili  x   /  AST  14<L>  /  ALT  15  /  AlkPhos  96  12-18      CAPILLARY BLOOD GLUCOSE      POCT Blood Glucose.: 159 mg/dL (19 Dec 2019 17:13)  POCT Blood Glucose.: 114 mg/dL (19 Dec 2019 12:19)  POCT Blood Glucose.: 128 mg/dL (19 Dec 2019 08:55)  POCT Blood Glucose.: 77 mg/dL (19 Dec 2019 08:36)  POCT Blood Glucose.: 61 mg/dL (19 Dec 2019 08:19)  POCT Blood Glucose.: 61 mg/dL (19 Dec 2019 08:17)  POCT Blood Glucose.: 247 mg/dL (18 Dec 2019 21:21)      Vital Signs Last 24 Hrs  T(C): 36.7 (19 Dec 2019 13:20), Max: 37.4 (18 Dec 2019 21:12)  T(F): 98.1 (19 Dec 2019 13:20), Max: 99.3 (18 Dec 2019 21:12)  HR: 63 (19 Dec 2019 13:20) (63 - 66)  BP: 155/81 (19 Dec 2019 13:20) (145/76 - 155/81)  BP(mean): --  RR: 17 (19 Dec 2019 13:20) (17 - 17)  SpO2: 97% (19 Dec 2019 13:20) (93% - 97%)            PHYSICAL EXAM:    Constitutional: NAD  HEENT: conjunctive   clear   Neck:  No JVD  Respiratory: CTAB  Cardiovascular: S1 and S2  Gastrointestinal: BS+, soft, NT/ND  Extremities: No peripheral edema  Neurological: A/O x 3, no focal deficits  Psychiatric: Normal mood, normal affect  : No Benitez  Skin: Distal left 2nd digit ulceration noted measuring approximately 1cm x 1cm x 1cm  Access: Not applicable    MEDICATIONS  (STANDING):  atorvastatin 10 milliGRAM(s) Oral at bedtime  azaTHIOprine 50 milliGRAM(s) Oral two times a day  cloNIDine 0.1 milliGRAM(s) Oral every 12 hours  dextrose 5% + lactated ringers. 1000 milliLiter(s) (75 mL/Hr) IV Continuous <Continuous>  dextrose 5%. 1000 milliLiter(s) (50 mL/Hr) IV Continuous <Continuous>  dextrose 50% Injectable 12.5 Gram(s) IV Push once  dextrose 50% Injectable 25 Gram(s) IV Push once  gabapentin 300 milliGRAM(s) Oral two times a day  hydrALAZINE 25 milliGRAM(s) Oral every 8 hours  losartan 25 milliGRAM(s) Oral daily  metoprolol tartrate 25 milliGRAM(s) Oral two times a day  tacrolimus 1.5 milliGRAM(s) Oral two times a day  trimethoprim   80 mG/sulfamethoxazole 400 mG 1 Tablet(s) Oral daily    MEDICATIONS  (PRN):  dextrose 40% Gel 15 Gram(s) Oral once PRN Blood Glucose LESS THAN 70 milliGRAM(s)/deciliter  glucagon  Injectable 1 milliGRAM(s) IntraMuscular once PRN Glucose LESS THAN 70 milligrams/deciliter

## 2019-12-19 NOTE — PROVIDER CONTACT NOTE (OTHER) - ACTION/TREATMENT ORDERED:
Dr. Velasquez notified no new orders.
case discussed with Dr Castro  Patient given directions to setting a temp basal less 20% delivery starting at 12M.   Goal 100-180 mg/dL
8oz apple juice administered. Re-checked bs @ [id number]= [id number]. Additional 2nd 8oz apple juice given. Rechecked [phone]. MD made aware.

## 2019-12-19 NOTE — PROGRESS NOTE ADULT - REASON FOR ADMISSION
osteomyelitis

## 2019-12-19 NOTE — DISCHARGE NOTE NURSING/CASE MANAGEMENT/SOCIAL WORK - PATIENT PORTAL LINK FT
You can access the FollowMyHealth Patient Portal offered by [facility] by registering at the following website: [email or web address]. By joining [employer]’s FollowMyHealth portal, you will also be able to view your health information using other applications (apps) compatible with our system.

## 2019-12-19 NOTE — PROGRESS NOTE ADULT - ASSESSMENT
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 Admitted for left 2nd toe osteomyelitis with surgical intervention with podiatry.

## 2019-12-19 NOTE — PROVIDER CONTACT NOTE (OTHER) - ASSESSMENT
alert and oriented x4
Pt a&ox4. Denies dizzyness, headache, lightheadedness, shakiness, heart palpitations, or chills.

## 2019-12-19 NOTE — PROGRESS NOTE ADULT - PROBLEM SELECTOR PLAN 1
Patient examined and evaluated at this time.  Discussed etiology of symptoms and all questions answered to patient and wife's satisfaction.  Surgical site dressed with adaptic and dry, sterile dressing.  OR bone biopsy is pending at this time.  Antibiotics as per ID recommendations and medical team.  Will monitor daily.

## 2019-12-19 NOTE — PROGRESS NOTE ADULT - PROBLEM SELECTOR PLAN 1
POD#2 left 2nd toe partial amputation with podiatry  - continue home daily bactrim at this time due to his immunosuppression due to transplant  - dressings as per podiatry  - Tissue cultures - + gram variable coccobacilli  - F/U bone pathology results  - BCx2 - NGTD  - LLE US doppler for swelling, no signs of DVT  - PT/OT evaluation  - Podiatry, Dr. Catherine following

## 2019-12-19 NOTE — PROGRESS NOTE ADULT - SUBJECTIVE AND OBJECTIVE BOX
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 Patient was being seen by podiatry Dr. Pabon for outpatient treatment of ulcer of the tip of the left 2nd toe for the past 3-4 months. Patient is on daily bactrim since 2013 due to his previous kidney transplant. 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 Patient s/p left 4th toe amputation a few years ago, without any post surgical complications. Patient admits to some swelling in the left lower extremity as compared to the right and b/l peripheral neuropathy. Denies any pain in the foot, chills or fever.     In the ED, vitals HR 63, /72, RR 16 Sp02 97% on RA, temp 97.7  1L NS IVF fluid bolus  EKG - NSR@ 58 with 1st degree AV block  Xray of left foot - osteomyelitis of the second terminal tuft (16 Dec 2019 17:37)      ----  INTERVAL HPI/OVERNIGHT EVENTS: Pt seen and evaluated at the bedside. No acute overnight events occurred. Patient denies any pain. Patient refusing IVF overnight.     ----  PAST MEDICAL & SURGICAL HISTORY:  CVA (cerebral vascular accident)  Obesity (BMI 30-39.9)  Diabetic peripheral neuropathy  CKD (chronic kidney disease)  Diabetes mellitus  Hyperlipidemia  Hypertension  End-stage renal failure with renal transplant: 12/2013  H/O foot surgery: 2005 - right foot - bone fracture - s/p ORIF and subsequent removal of hardware  Vasectomy status: s/p b/l  vasectomy  Ear disease: Left inner ear surgery  Toe infection: 2007 L 4th Toe surgery-amputation secondary to osteomyelitis      FAMILY HISTORY:  No pertinent family history in first degree relatives      Allergies    No Known Allergies    Intolerances        ----  REVIEW OF SYSTEMS:  CONSTITUTIONAL: denies fever, chills, fatigue, weakness  HEENT: denies blurred vision, sore throat  SKIN: denies new lesions, rash  CARDIOVASCULAR: denies chest pain, chest pressure, palpitations  RESPIRATORY: denies shortness of breath, sputum production  GASTROINTESTINAL: denies nausea, vomiting, diarrhea, abdominal pain  GENITOURINARY: denies dysuria, discharge  NEUROLOGICAL: denies numbness, headache, focal weakness  MUSCULOSKELETAL: denies new joint pain, muscle aches  HEMATOLOGIC: denies gross bleeding, bruising  LYMPHATICS: denies enlarged lymph nodes, extremity swelling  PSYCHIATRIC: denies recent changes in anxiety, depression  ENDOCRINOLOGIC: denies sweating, cold or heat intolerance    ----  PHYSICAL EXAM:  GENERAL: patient appears well, no acute distress, appropriately interactive  EYES: sclera clear, no exudates  NECK: supple, soft, no thyromegaly noted  LUNGS: good air entry bilaterally, clear to auscultation, symmetric breath sounds, no wheezing or rhonchi appreciated  HEART: soft S1/S2, regular rate and rhythm, no murmurs noted, no noted edema to b/l LE  GASTROINTESTINAL: abdomen is soft, nontender, nondistended, normoactive bowel sounds, no palpable masses  MUSCULOSKELETAL: no clubbing or cyanosis, no obvious deformity  NEUROLOGIC: awake, alert, oriented x3, good muscle tone in 4 extremities, no obvious sensory deficits  PSYCHIATRIC: mood is good, affect is congruent with mood, linear and logical thought process    T(C): 36.4 (12-19-19 @ 05:39), Max: 37.4 (12-18-19 @ 21:12)  HR: 64 (12-19-19 @ 05:39) (64 - 79)  BP: 145/76 (12-19-19 @ 05:39) (145/76 - 175/92)  RR: 17 (12-19-19 @ 05:39) (17 - 18)  SpO2: 96% (12-19-19 @ 05:39) (93% - 96%)  Wt(kg): --    ----  I&O's Summary    18 Dec 2019 07:01  -  19 Dec 2019 07:00  --------------------------------------------------------  IN: 350 mL / OUT: 1600 mL / NET: -1250 mL        LABS:                        14.3   6.34  )-----------( 212      ( 19 Dec 2019 08:50 )             43.8     12-19    141  |  105  |  19  ----------------------------<  55<L>  3.6   |  30  |  1.10    Ca    9.3      19 Dec 2019 08:50    TPro  7.0  /  Alb  3.1<L>  /  TBili  0.5  /  DBili  x   /  AST  14<L>  /  ALT  15  /  AlkPhos  96  12-18        CAPILLARY BLOOD GLUCOSE      POCT Blood Glucose.: 128 mg/dL (19 Dec 2019 08:55)  POCT Blood Glucose.: 77 mg/dL (19 Dec 2019 08:36)  POCT Blood Glucose.: 61 mg/dL (19 Dec 2019 08:19)  POCT Blood Glucose.: 61 mg/dL (19 Dec 2019 08:17)  POCT Blood Glucose.: 247 mg/dL (18 Dec 2019 21:21)  POCT Blood Glucose.: 148 mg/dL (18 Dec 2019 17:16)  POCT Blood Glucose.: 132 mg/dL (18 Dec 2019 11:58)      12-17 @ 22:07   No growth  --  --  12-17 @ 09:23   No growth to date.  --  --  12-17 @ 00:38   No growth to date.  --  --  12-16 @ 22:35   Few Corynebacterium species "Susceptibilities not performed"  --  --            ----  Personally reviewed:  Vital sign trends: [ x ] yes    [  ] no     [  ] n/a  Laboratory results: [x  ] yes    [  ] no     [  ] n/a  Radiology results: [  ] yes    [  ] no     [ x ] n/a  Culture results: [ x ] yes    [  ] no     [  ] n/a  Consultant recommendations: [x ] yes    [  ] no     [  ] n/a

## 2019-12-22 LAB
CULTURE RESULTS: SIGNIFICANT CHANGE UP
SPECIMEN SOURCE: SIGNIFICANT CHANGE UP

## 2019-12-23 ENCOUNTER — OUTPATIENT (OUTPATIENT)
Dept: OUTPATIENT SERVICES | Facility: HOSPITAL | Age: 54
LOS: 1 days | Discharge: HOME | End: 2019-12-23
Payer: MEDICARE

## 2019-12-23 ENCOUNTER — APPOINTMENT (OUTPATIENT)
Dept: WOUND CARE | Facility: HOSPITAL | Age: 54
End: 2019-12-23
Payer: MEDICARE

## 2019-12-23 VITALS
BODY MASS INDEX: 38.97 KG/M2 | TEMPERATURE: 97.6 F | HEART RATE: 68 BPM | RESPIRATION RATE: 20 BRPM | HEIGHT: 73 IN | DIASTOLIC BLOOD PRESSURE: 75 MMHG | WEIGHT: 294 LBS | SYSTOLIC BLOOD PRESSURE: 156 MMHG

## 2019-12-23 DIAGNOSIS — I63.539 CEREBRAL INFARCTION DUE TO UNSPECIFIED OCCLUSION OR STENOSIS OF UNSPECIFIED POSTERIOR CEREBRAL ARTERY: ICD-10-CM

## 2019-12-23 DIAGNOSIS — Z80.8 FAMILY HISTORY OF MALIGNANT NEOPLASM OF OTHER ORGANS OR SYSTEMS: ICD-10-CM

## 2019-12-23 DIAGNOSIS — Z87.81 PERSONAL HISTORY OF (HEALED) TRAUMATIC FRACTURE: ICD-10-CM

## 2019-12-23 DIAGNOSIS — S92.902D UNSPECIFIED FRACTURE OF LEFT FOOT, SUBSEQUENT ENCOUNTER FOR FRACTURE WITH ROUTINE HEALING: ICD-10-CM

## 2019-12-23 DIAGNOSIS — Z09 ENCOUNTER FOR FOLLOW-UP EXAMINATION AFTER COMPLETED TREATMENT FOR CONDITIONS OTHER THAN MALIGNANT NEOPLASM: ICD-10-CM

## 2019-12-23 DIAGNOSIS — Z87.09 PERSONAL HISTORY OF OTHER DISEASES OF THE RESPIRATORY SYSTEM: ICD-10-CM

## 2019-12-23 DIAGNOSIS — Z85.828 PERSONAL HISTORY OF OTHER MALIGNANT NEOPLASM OF SKIN: ICD-10-CM

## 2019-12-23 DIAGNOSIS — Z83.3 FAMILY HISTORY OF DIABETES MELLITUS: ICD-10-CM

## 2019-12-23 DIAGNOSIS — Z87.2 PERSONAL HISTORY OF DISEASES OF THE SKIN AND SUBCUTANEOUS TISSUE: ICD-10-CM

## 2019-12-23 DIAGNOSIS — N18.6 END STAGE RENAL DISEASE: Chronic | ICD-10-CM

## 2019-12-23 DIAGNOSIS — S98.139A COMPLETE TRAUMATIC AMPUTATION OF ONE UNSPECIFIED LESSER TOE, INITIAL ENCOUNTER: ICD-10-CM

## 2019-12-23 PROCEDURE — G0463: CPT

## 2019-12-23 PROCEDURE — 99024 POSTOP FOLLOW-UP VISIT: CPT

## 2019-12-23 RX ORDER — METOPROLOL TARTRATE 75 MG/1
75 TABLET, FILM COATED ORAL
Qty: 180 | Refills: 3 | Status: DISCONTINUED | COMMUNITY
Start: 2017-05-22 | End: 2019-12-23

## 2019-12-23 RX ORDER — INSULIN LISPRO 100 [IU]/ML
100 INJECTION, SOLUTION INTRAVENOUS; SUBCUTANEOUS
Refills: 0 | Status: ACTIVE | COMMUNITY

## 2019-12-23 RX ORDER — PNEUMOCOCCAL VACCINE POLYVALENT 25; 25; 25; 25; 25; 25; 25; 25; 25; 25; 25; 25; 25; 25; 25; 25; 25; 25; 25; 25; 25; 25; 25 UG/.5ML; UG/.5ML; UG/.5ML; UG/.5ML; UG/.5ML; UG/.5ML; UG/.5ML; UG/.5ML; UG/.5ML; UG/.5ML; UG/.5ML; UG/.5ML; UG/.5ML; UG/.5ML; UG/.5ML; UG/.5ML; UG/.5ML; UG/.5ML; UG/.5ML; UG/.5ML; UG/.5ML; UG/.5ML; UG/.5ML
25 INJECTION, SOLUTION INTRAMUSCULAR; SUBCUTANEOUS
Qty: 1 | Refills: 0 | Status: DISCONTINUED | COMMUNITY
Start: 2018-10-15 | End: 2019-12-23

## 2019-12-23 RX ORDER — GABAPENTIN 300 MG/1
300 CAPSULE ORAL TWICE DAILY
Refills: 0 | Status: ACTIVE | COMMUNITY

## 2019-12-23 RX ORDER — INFLUENZA A VIRUS A/VICTORIA/4897/2022 IVR-238 (H1N1) ANTIGEN (FORMALDEHYDE INACTIVATED), INFLUENZA A VIRUS A/DARWIN/9/2021 SAN-010 (H3N2) ANTIGEN (FORMALDEHYDE INACTIVATED), INFLUENZA B VIRUS B/PHUKET/3073/2013 ANTIGEN (FORMALDEHYDE INACTIVATED), AND INFLUENZA B VIRUS B/MICHIGAN/01/2021 ANTIGEN (FORMALDEHYDE INACTIVATED) 15; 15; 15; 15 UG/.5ML; UG/.5ML; UG/.5ML; UG/.5ML
0.5 INJECTION, SUSPENSION INTRAMUSCULAR
Qty: 1 | Refills: 0 | Status: DISCONTINUED | COMMUNITY
Start: 2018-10-15 | End: 2019-12-23

## 2019-12-23 NOTE — PHYSICAL EXAM
[1+] : left 1+ [Ankle Swelling (On Exam)] : present [Ankle Swelling Bilaterally] : bilaterally  [Ankle Swelling On The Right] : mild [JVD] : no jugular venous distention  [de-identified] : calm [de-identified] : s/p left 2nd digit partial toe amputation, s/p left partial 4th ray amputation [FreeTextEntry1] : Left Foot 2nd Digit Distal Amputation site- Incision line D&I with sutures in place [de-identified] : sutures intact to left 2nd digit, no erythema, no ecchymosis, no fluctuance, no malodor, no probe to bone, no proximal streaking [de-identified] : CIRCULATION\par Dorsalis Pedis: R palpable  L palpable\par Posterior Tibialis: R Doppler L Doppler\par Extremity Color: Pigmented\par Extremity Temperature: Warm\par Capillary Refill: < 3 seconds bilaterally\par PHUONG: preauth sheet for Vascular studies submitted\par \par  [de-identified] : Intact [de-identified] : Silver Alginate/ Dry Dressing & Kerlix/ Ace Bandage for bandage support [de-identified] : Cleansed with Normal saline\par  [TWNoteComboBox4] : None [de-identified] : None

## 2019-12-23 NOTE — REVIEW OF SYSTEMS
[Skin Wound] : skin wound [Fever] : no fever [Chills] : no chills [Eye Pain] : no eye pain [Eyesight Problems] : no eyesight problems [Earache] : no earache [Chest Pain] : no chest pain [Nosebleeds] : no nosebleeds [Cough] : no cough [Abdominal Pain] : no abdominal pain [Shortness Of Breath] : no shortness of breath [de-identified] : left 2nd digit DFU3 s/p partial toe amputation [Vomiting] : no vomiting

## 2019-12-23 NOTE — VITALS
[de-identified] : Pt denies c/o any pains or discomforts at present [Pain related to present condition?] : The patient's  pain is not related to present condition.

## 2019-12-23 NOTE — ASSESSMENT
[Demo] : Demo [Verbal] : Verbal [Spouse] : Spouse [Patient] : Patient [Good - alert, interested, motivated] : Good - alert, interested, motivated [Family member] : Family member [Verbalizes knowledge/Understanding] : Verbalizes knowledge/understanding [Dressing changes] : dressing changes [Foot Care] : foot care [Skin Care] : skin care [Pressure relief] : pressure relief [Signs and symptoms of infection] : sign and symptoms of infection [Hyperbaric Therapy] : hyperbaric therapy [Labs and Tests] : labs and tests [How and When to Call] : how and when to call [Off-loading] : off-loading [Compression Therapy] : compression therapy [Patient responsibility to plan of care] : patient responsibility to plan of care [] : Yes [Home] : Home [Stable] : stable [Cane] : Cane [FreeTextEntry2] : Alteration in skin integrity- promote optimal skin integrity\par  [FreeTextEntry4] : Dr Catherine/ Photos taken\par Pathology results discussed with Pt & Family by Dr Catherine\par HBOT discussed- not needed at this time as per Dr Catherine\par F/U to [facility] in 1 week

## 2019-12-23 NOTE — PLAN
[FreeTextEntry1] : Patient examined and evaluated at this time.\par sutures intact, will plan for removal on next visit\par pt to cont abx as per ID\par dress with silver alginate and dry, sterile dressing\par Continue local wound care and offloading.

## 2019-12-23 NOTE — REASON FOR VISIT
[Consultation] : a consultation visit [Spouse] : spouse [Family Member] : family member [FreeTextEntry1] : New E#valuation

## 2019-12-25 DIAGNOSIS — Z79.4 LONG TERM (CURRENT) USE OF INSULIN: ICD-10-CM

## 2019-12-25 DIAGNOSIS — N18.3 CHRONIC KIDNEY DISEASE, STAGE 3 (MODERATE): ICD-10-CM

## 2019-12-25 DIAGNOSIS — Z87.81 PERSONAL HISTORY OF (HEALED) TRAUMATIC FRACTURE: ICD-10-CM

## 2019-12-25 DIAGNOSIS — Z80.8 FAMILY HISTORY OF MALIGNANT NEOPLASM OF OTHER ORGANS OR SYSTEMS: ICD-10-CM

## 2019-12-25 DIAGNOSIS — Z94.0 KIDNEY TRANSPLANT STATUS: ICD-10-CM

## 2019-12-25 DIAGNOSIS — Z80.3 FAMILY HISTORY OF MALIGNANT NEOPLASM OF BREAST: ICD-10-CM

## 2019-12-25 DIAGNOSIS — Z83.3 FAMILY HISTORY OF DIABETES MELLITUS: ICD-10-CM

## 2019-12-25 DIAGNOSIS — Z79.899 OTHER LONG TERM (CURRENT) DRUG THERAPY: ICD-10-CM

## 2019-12-25 DIAGNOSIS — E11.22 TYPE 2 DIABETES MELLITUS WITH DIABETIC CHRONIC KIDNEY DISEASE: ICD-10-CM

## 2019-12-25 DIAGNOSIS — E11.69 TYPE 2 DIABETES MELLITUS WITH OTHER SPECIFIED COMPLICATION: ICD-10-CM

## 2019-12-25 DIAGNOSIS — M86.672 OTHER CHRONIC OSTEOMYELITIS, LEFT ANKLE AND FOOT: ICD-10-CM

## 2019-12-25 DIAGNOSIS — E11.40 TYPE 2 DIABETES MELLITUS WITH DIABETIC NEUROPATHY, UNSPECIFIED: ICD-10-CM

## 2019-12-25 DIAGNOSIS — Z89.422 ACQUIRED ABSENCE OF OTHER LEFT TOE(S): ICD-10-CM

## 2019-12-25 DIAGNOSIS — E78.5 HYPERLIPIDEMIA, UNSPECIFIED: ICD-10-CM

## 2019-12-25 DIAGNOSIS — G47.33 OBSTRUCTIVE SLEEP APNEA (ADULT) (PEDIATRIC): ICD-10-CM

## 2019-12-25 DIAGNOSIS — Z86.73 PERSONAL HISTORY OF TRANSIENT ISCHEMIC ATTACK (TIA), AND CEREBRAL INFARCTION WITHOUT RESIDUAL DEFICITS: ICD-10-CM

## 2019-12-25 DIAGNOSIS — Z85.828 PERSONAL HISTORY OF OTHER MALIGNANT NEOPLASM OF SKIN: ICD-10-CM

## 2019-12-25 DIAGNOSIS — I12.9 HYPERTENSIVE CHRONIC KIDNEY DISEASE WITH STAGE 1 THROUGH STAGE 4 CHRONIC KIDNEY DISEASE, OR UNSPECIFIED CHRONIC KIDNEY DISEASE: ICD-10-CM

## 2019-12-25 DIAGNOSIS — Z86.718 PERSONAL HISTORY OF OTHER VENOUS THROMBOSIS AND EMBOLISM: ICD-10-CM

## 2019-12-31 ENCOUNTER — OUTPATIENT (OUTPATIENT)
Dept: OUTPATIENT SERVICES | Facility: HOSPITAL | Age: 54
LOS: 1 days | Discharge: HOME | End: 2019-12-31
Payer: MEDICARE

## 2019-12-31 ENCOUNTER — APPOINTMENT (OUTPATIENT)
Dept: PODIATRY | Facility: HOSPITAL | Age: 54
End: 2019-12-31
Payer: MEDICARE

## 2019-12-31 VITALS
RESPIRATION RATE: 20 BRPM | HEART RATE: 64 BPM | BODY MASS INDEX: 38.97 KG/M2 | TEMPERATURE: 98.6 F | OXYGEN SATURATION: 95 % | SYSTOLIC BLOOD PRESSURE: 142 MMHG | HEIGHT: 73 IN | DIASTOLIC BLOOD PRESSURE: 82 MMHG | WEIGHT: 294 LBS

## 2019-12-31 DIAGNOSIS — N18.6 END STAGE RENAL DISEASE: Chronic | ICD-10-CM

## 2019-12-31 DIAGNOSIS — E11.69 TYPE 2 DIABETES MELLITUS WITH OTHER SPECIFIED COMPLICATION: ICD-10-CM

## 2019-12-31 DIAGNOSIS — Z09 ENCOUNTER FOR FOLLOW-UP EXAMINATION AFTER COMPLETED TREATMENT FOR CONDITIONS OTHER THAN MALIGNANT NEOPLASM: ICD-10-CM

## 2019-12-31 PROCEDURE — 99213 OFFICE O/P EST LOW 20 MIN: CPT

## 2019-12-31 PROCEDURE — G0463: CPT

## 2019-12-31 NOTE — REVIEW OF SYSTEMS
[Fever] : no fever [Eye Pain] : no eye pain [Chills] : no chills [Eyesight Problems] : no eyesight problems [Earache] : no earache [Nosebleeds] : no nosebleeds [Chest Pain] : no chest pain [Shortness Of Breath] : no shortness of breath [Abdominal Pain] : no abdominal pain [Cough] : no cough [Skin Wound] : skin wound [Joint Stiffness] : joint stiffness [Vomiting] : no vomiting [Anxiety] : no anxiety [FreeTextEntry5] : HTN, HLD [Easy Bruising] : a tendency for easy bruising [FreeTextEntry9] : hammer toes , previous amp of the left foot 4th toe  [de-identified] : left 2nd digit DFU3 s/p partial toe amputation , all sutures removed  [de-identified] : MARILUZ [de-identified] : IDDM with neuropathy

## 2019-12-31 NOTE — PLAN
[FreeTextEntry1] : continue wound care , steri strips placed on 2nd toe . patient to return in 1 week

## 2019-12-31 NOTE — ASSESSMENT
[Verbal] : Verbal [Patient] : Patient [Family member] : Family member [Good - alert, interested, motivated] : Good - alert, interested, motivated [Verbalizes knowledge/Understanding] : Verbalizes knowledge/understanding [Foot Care] : foot care [Skin Care] : skin care [Signs and symptoms of infection] : sign and symptoms of infection [How and When to Call] : how and when to call [Off-loading] : off-loading [Glycemic Control] : glycemic control [Patient responsibility to plan of care] : patient responsibility to plan of care [Stable] : stable [Ambulatory] : Ambulatory [Home] : Home [Not Applicable - Long Term Care/Home Health Agency] : Long Term Care/Home Health Agency: Not Applicable [Dressing changes] : dressing changes [FreeTextEntry2] : Promote optimal skin integrity, offloading, infection prevention\par  [] : No [FreeTextEntry4] : Dr. Araiza\par Vasreji studies are pending authorization\par f/u 1 week

## 2019-12-31 NOTE — PHYSICAL EXAM
[4 x 4] : 4 x 4  [JVD] : no jugular venous distention  [1+] : left 1+ [Ankle Swelling Bilaterally] : bilaterally  [Ankle Swelling (On Exam)] : present [Skin Ulcer] : no ulcer [Purpura] : no purpura  [Ankle Swelling On The Right] : mild [Petechiae] : no petechiae [Oriented to Person] : oriented to person [Alert] : alert [Oriented to Place] : oriented to place [Oriented to Time] : oriented to time [Calm] : calm [de-identified] : s/p left 2nd digit partial toe amputation, s/p left partial 4th ray amputation [de-identified] : sutures removed  left 2nd digit, no erythema, no ecchymosis, no fluctuance, no malodor, no probe to bone, no proximal streaking [de-identified] : calm [de-identified] : Diabetic neuropathy [FreeTextEntry1] : Left foot 2nd digit distal amputation site - incision line dry and intact with sutures in place [de-identified] : Sutures removed.  Steri-strips applied. [de-identified] : intact [de-identified] : loose ace bandage to secure the dressing [de-identified] : dry dressing [de-identified] : NSC [TWNoteComboBox4] : None

## 2020-01-04 DIAGNOSIS — Z79.899 OTHER LONG TERM (CURRENT) DRUG THERAPY: ICD-10-CM

## 2020-01-04 DIAGNOSIS — M86.672 OTHER CHRONIC OSTEOMYELITIS, LEFT ANKLE AND FOOT: ICD-10-CM

## 2020-01-04 DIAGNOSIS — E11.40 TYPE 2 DIABETES MELLITUS WITH DIABETIC NEUROPATHY, UNSPECIFIED: ICD-10-CM

## 2020-01-04 DIAGNOSIS — Z94.0 KIDNEY TRANSPLANT STATUS: ICD-10-CM

## 2020-01-04 DIAGNOSIS — Z83.3 FAMILY HISTORY OF DIABETES MELLITUS: ICD-10-CM

## 2020-01-04 DIAGNOSIS — I12.9 HYPERTENSIVE CHRONIC KIDNEY DISEASE WITH STAGE 1 THROUGH STAGE 4 CHRONIC KIDNEY DISEASE, OR UNSPECIFIED CHRONIC KIDNEY DISEASE: ICD-10-CM

## 2020-01-04 DIAGNOSIS — E11.69 TYPE 2 DIABETES MELLITUS WITH OTHER SPECIFIED COMPLICATION: ICD-10-CM

## 2020-01-04 DIAGNOSIS — N18.3 CHRONIC KIDNEY DISEASE, STAGE 3 (MODERATE): ICD-10-CM

## 2020-01-04 DIAGNOSIS — Z48.02 ENCOUNTER FOR REMOVAL OF SUTURES: ICD-10-CM

## 2020-01-04 DIAGNOSIS — E78.5 HYPERLIPIDEMIA, UNSPECIFIED: ICD-10-CM

## 2020-01-04 DIAGNOSIS — Z86.718 PERSONAL HISTORY OF OTHER VENOUS THROMBOSIS AND EMBOLISM: ICD-10-CM

## 2020-01-04 DIAGNOSIS — Z79.4 LONG TERM (CURRENT) USE OF INSULIN: ICD-10-CM

## 2020-01-04 DIAGNOSIS — Z80.8 FAMILY HISTORY OF MALIGNANT NEOPLASM OF OTHER ORGANS OR SYSTEMS: ICD-10-CM

## 2020-01-04 DIAGNOSIS — Z87.81 PERSONAL HISTORY OF (HEALED) TRAUMATIC FRACTURE: ICD-10-CM

## 2020-01-04 DIAGNOSIS — G47.33 OBSTRUCTIVE SLEEP APNEA (ADULT) (PEDIATRIC): ICD-10-CM

## 2020-01-04 DIAGNOSIS — Z80.3 FAMILY HISTORY OF MALIGNANT NEOPLASM OF BREAST: ICD-10-CM

## 2020-01-04 DIAGNOSIS — Z85.828 PERSONAL HISTORY OF OTHER MALIGNANT NEOPLASM OF SKIN: ICD-10-CM

## 2020-01-04 DIAGNOSIS — Z86.73 PERSONAL HISTORY OF TRANSIENT ISCHEMIC ATTACK (TIA), AND CEREBRAL INFARCTION WITHOUT RESIDUAL DEFICITS: ICD-10-CM

## 2020-01-04 DIAGNOSIS — E11.22 TYPE 2 DIABETES MELLITUS WITH DIABETIC CHRONIC KIDNEY DISEASE: ICD-10-CM

## 2020-01-04 DIAGNOSIS — Z89.422 ACQUIRED ABSENCE OF OTHER LEFT TOE(S): ICD-10-CM

## 2020-01-07 ENCOUNTER — OUTPATIENT (OUTPATIENT)
Dept: OUTPATIENT SERVICES | Facility: HOSPITAL | Age: 55
LOS: 1 days | Discharge: HOME | End: 2020-01-07
Payer: MEDICARE

## 2020-01-07 ENCOUNTER — APPOINTMENT (OUTPATIENT)
Dept: PODIATRY | Facility: HOSPITAL | Age: 55
End: 2020-01-07
Payer: MEDICARE

## 2020-01-07 VITALS
WEIGHT: 294 LBS | HEIGHT: 73 IN | RESPIRATION RATE: 20 BRPM | OXYGEN SATURATION: 95 % | BODY MASS INDEX: 38.97 KG/M2 | SYSTOLIC BLOOD PRESSURE: 152 MMHG | HEART RATE: 59 BPM | DIASTOLIC BLOOD PRESSURE: 82 MMHG | TEMPERATURE: 97.7 F

## 2020-01-07 DIAGNOSIS — N18.6 END STAGE RENAL DISEASE: Chronic | ICD-10-CM

## 2020-01-07 DIAGNOSIS — E11.69 TYPE 2 DIABETES MELLITUS WITH OTHER SPECIFIED COMPLICATION: ICD-10-CM

## 2020-01-07 PROCEDURE — G0463: CPT

## 2020-01-07 PROCEDURE — 99213 OFFICE O/P EST LOW 20 MIN: CPT

## 2020-01-07 NOTE — PLAN
[FreeTextEntry1] : Patient able to transition into shoe , shower and resume activetys , patient and family happy with the results

## 2020-01-07 NOTE — ASSESSMENT
[Verbal] : Verbal [Patient] : Patient [Fair - mild discomfort, physical impairment, low acceptance] : Fair - mild discomfort, physical impairment, low acceptance [Verbalizes knowledge/Understanding] : Verbalizes knowledge/understanding [Signs and symptoms of infection] : sign and symptoms of infection [How and When to Call] : how and when to call [Patient responsibility to plan of care] : patient responsibility to plan of care [Stable] : stable [Home] : Home [Ambulatory] : Ambulatory [Not Applicable - Long Term Care/Home Health Agency] : Long Term Care/Home Health Agency: Not Applicable [] : No [FreeTextEntry4] : Patient reports that he will be completing his vascular testing tomorrow. (1/8/20)\par F/U 1 week  [FreeTextEntry2] : Maintain Skin Integrity\par F/U 1 week

## 2020-01-07 NOTE — REVIEW OF SYSTEMS
[Fever] : no fever [Chills] : no chills [Eye Pain] : no eye pain [Nosebleeds] : no nosebleeds [Eyesight Problems] : no eyesight problems [Earache] : no earache [Shortness Of Breath] : no shortness of breath [Chest Pain] : no chest pain [Abdominal Pain] : no abdominal pain [Cough] : no cough [Joint Stiffness] : joint stiffness [Vomiting] : no vomiting [Anxiety] : no anxiety [Skin Wound] : skin wound [FreeTextEntry5] : HTN, HLD [Easy Bruising] : a tendency for easy bruising [de-identified] : IDDM with neuropathy  [de-identified] : left 2nd digit DFU3 s/p partial toe amputation , no open wound closed  [FreeTextEntry9] : hammer toes , previous amp of the left foot 4th toe  [de-identified] : MARILUZ

## 2020-01-07 NOTE — PHYSICAL EXAM
[4 x 4] : 4 x 4  [JVD] : no jugular venous distention  [1+] : left 1+ [Ankle Swelling (On Exam)] : present [Ankle Swelling Bilaterally] : bilaterally  [Ankle Swelling On The Right] : mild [Purpura] : no purpura  [Petechiae] : no petechiae [Skin Ulcer] : no ulcer [Alert] : alert [Oriented to Person] : oriented to person [Oriented to Place] : oriented to place [Calm] : calm [Oriented to Time] : oriented to time [de-identified] : calm [de-identified] : s/p left 2nd digit partial toe amputation, s/p left partial 4th ray amputation [de-identified] : Diabetic neuropathy [de-identified] : sutures removed  left 2nd digit, no erythema, no ecchymosis, no fluctuance, no malodor, no probe to bone,  [de-identified] : DPM shaved dry eschar [FreeTextEntry1] : Foot 2nd Digit Amp Site- steristrips inplace [de-identified] : Dry protective dressing [de-identified] : Dry eschar [TWNoteComboBox1] : Left [de-identified] : Cleansed with Normal saline\par  [TWNoteComboBox4] : None [de-identified] : No [TWNoteComboBox6] : Surgical [TWNoteComboBox5] : No [de-identified] : other [de-identified] : None [de-identified] : None [de-identified] : 100% [de-identified] : No

## 2020-01-08 ENCOUNTER — OUTPATIENT (OUTPATIENT)
Dept: OUTPATIENT SERVICES | Facility: HOSPITAL | Age: 55
LOS: 1 days | End: 2020-01-08
Payer: MEDICARE

## 2020-01-08 DIAGNOSIS — Z94.0 KIDNEY TRANSPLANT STATUS: ICD-10-CM

## 2020-01-08 DIAGNOSIS — Z80.3 FAMILY HISTORY OF MALIGNANT NEOPLASM OF BREAST: ICD-10-CM

## 2020-01-08 DIAGNOSIS — E11.69 TYPE 2 DIABETES MELLITUS WITH OTHER SPECIFIED COMPLICATION: ICD-10-CM

## 2020-01-08 DIAGNOSIS — I12.9 HYPERTENSIVE CHRONIC KIDNEY DISEASE WITH STAGE 1 THROUGH STAGE 4 CHRONIC KIDNEY DISEASE, OR UNSPECIFIED CHRONIC KIDNEY DISEASE: ICD-10-CM

## 2020-01-08 DIAGNOSIS — E11.22 TYPE 2 DIABETES MELLITUS WITH DIABETIC CHRONIC KIDNEY DISEASE: ICD-10-CM

## 2020-01-08 DIAGNOSIS — N18.3 CHRONIC KIDNEY DISEASE, STAGE 3 (MODERATE): ICD-10-CM

## 2020-01-08 DIAGNOSIS — G47.33 OBSTRUCTIVE SLEEP APNEA (ADULT) (PEDIATRIC): ICD-10-CM

## 2020-01-08 DIAGNOSIS — Z87.81 PERSONAL HISTORY OF (HEALED) TRAUMATIC FRACTURE: ICD-10-CM

## 2020-01-08 DIAGNOSIS — Z86.718 PERSONAL HISTORY OF OTHER VENOUS THROMBOSIS AND EMBOLISM: ICD-10-CM

## 2020-01-08 DIAGNOSIS — M86.672 OTHER CHRONIC OSTEOMYELITIS, LEFT ANKLE AND FOOT: ICD-10-CM

## 2020-01-08 DIAGNOSIS — N18.6 END STAGE RENAL DISEASE: Chronic | ICD-10-CM

## 2020-01-08 DIAGNOSIS — Z79.4 LONG TERM (CURRENT) USE OF INSULIN: ICD-10-CM

## 2020-01-08 DIAGNOSIS — Z83.3 FAMILY HISTORY OF DIABETES MELLITUS: ICD-10-CM

## 2020-01-08 DIAGNOSIS — Z80.8 FAMILY HISTORY OF MALIGNANT NEOPLASM OF OTHER ORGANS OR SYSTEMS: ICD-10-CM

## 2020-01-08 DIAGNOSIS — E11.40 TYPE 2 DIABETES MELLITUS WITH DIABETIC NEUROPATHY, UNSPECIFIED: ICD-10-CM

## 2020-01-08 DIAGNOSIS — Z85.828 PERSONAL HISTORY OF OTHER MALIGNANT NEOPLASM OF SKIN: ICD-10-CM

## 2020-01-08 DIAGNOSIS — Z89.422 ACQUIRED ABSENCE OF OTHER LEFT TOE(S): ICD-10-CM

## 2020-01-08 DIAGNOSIS — Z86.73 PERSONAL HISTORY OF TRANSIENT ISCHEMIC ATTACK (TIA), AND CEREBRAL INFARCTION WITHOUT RESIDUAL DEFICITS: ICD-10-CM

## 2020-01-08 DIAGNOSIS — E78.5 HYPERLIPIDEMIA, UNSPECIFIED: ICD-10-CM

## 2020-01-08 DIAGNOSIS — Z79.899 OTHER LONG TERM (CURRENT) DRUG THERAPY: ICD-10-CM

## 2020-01-08 DIAGNOSIS — E11.621 TYPE 2 DIABETES MELLITUS WITH FOOT ULCER: ICD-10-CM

## 2020-01-08 PROCEDURE — 93923 UPR/LXTR ART STDY 3+ LVLS: CPT

## 2020-01-08 PROCEDURE — 93923 UPR/LXTR ART STDY 3+ LVLS: CPT | Mod: 26

## 2020-01-14 ENCOUNTER — APPOINTMENT (OUTPATIENT)
Dept: PODIATRY | Facility: HOSPITAL | Age: 55
End: 2020-01-14
Payer: MEDICARE

## 2020-01-14 ENCOUNTER — OUTPATIENT (OUTPATIENT)
Dept: OUTPATIENT SERVICES | Facility: HOSPITAL | Age: 55
LOS: 1 days | Discharge: HOME | End: 2020-01-14
Payer: MEDICARE

## 2020-01-14 VITALS
RESPIRATION RATE: 20 BRPM | HEIGHT: 73 IN | BODY MASS INDEX: 38.97 KG/M2 | TEMPERATURE: 97.3 F | SYSTOLIC BLOOD PRESSURE: 143 MMHG | HEART RATE: 60 BPM | OXYGEN SATURATION: 96 % | DIASTOLIC BLOOD PRESSURE: 80 MMHG | WEIGHT: 294 LBS

## 2020-01-14 DIAGNOSIS — N18.6 END STAGE RENAL DISEASE: Chronic | ICD-10-CM

## 2020-01-14 DIAGNOSIS — E11.69 TYPE 2 DIABETES MELLITUS WITH OTHER SPECIFIED COMPLICATION: ICD-10-CM

## 2020-01-14 PROCEDURE — G0463: CPT

## 2020-01-14 PROCEDURE — 99024 POSTOP FOLLOW-UP VISIT: CPT

## 2020-01-14 NOTE — PHYSICAL EXAM
[JVD] : no jugular venous distention  [1+] : left 1+ [Ankle Swelling (On Exam)] : present [Ankle Swelling On The Right] : mild [Ankle Swelling Bilaterally] : bilaterally  [Purpura] : no purpura  [Skin Ulcer] : no ulcer [Petechiae] : no petechiae [Alert] : alert [Oriented to Person] : oriented to person [Oriented to Time] : oriented to time [Oriented to Place] : oriented to place [Calm] : calm [de-identified] : calm [de-identified] : s/p left 2nd digit partial toe amputation, s/p left partial 4th ray amputation [de-identified] : Diabetic neuropathy [de-identified] :  left 2nd digit, no erythema, no ecchymosis, no fluctuance, no malodor, closed [FreeTextEntry1] : Foot 2nd Digit Amp Site- CLOSED  [de-identified] : White sock  [de-identified] : Cleansed with Normal saline\par \par  [TWNoteComboBox1] : Left [de-identified] : Daily [de-identified] : Primary Dressing

## 2020-01-14 NOTE — ASSESSMENT
[Verbal] : Verbal [Patient] : Patient [Fair - mild discomfort, physical impairment, low acceptance] : Fair - mild discomfort, physical impairment, low acceptance [Dressing changes] : dressing changes [Verbalizes knowledge/Understanding] : Verbalizes knowledge/understanding [Signs and symptoms of infection] : sign and symptoms of infection [Foot Care] : foot care [How and When to Call] : how and when to call [Patient responsibility to plan of care] : patient responsibility to plan of care [Stable] : stable [Ambulatory] : Ambulatory [Home] : Home [Not Applicable - Long Term Care/Home Health Agency] : Long Term Care/Home Health Agency: Not Applicable [] : No [FreeTextEntry2] : Maintain Optimal Skin Integrity\par Discharge [FreeTextEntry4] : Discharged

## 2020-01-14 NOTE — REVIEW OF SYSTEMS
[Fever] : no fever [Chills] : no chills [Eye Pain] : no eye pain [Eyesight Problems] : no eyesight problems [Nosebleeds] : no nosebleeds [Earache] : no earache [Cough] : no cough [Chest Pain] : no chest pain [Shortness Of Breath] : no shortness of breath [Abdominal Pain] : no abdominal pain [Vomiting] : no vomiting [Joint Stiffness] : joint stiffness [Skin Wound] : skin wound [Anxiety] : no anxiety [Easy Bruising] : a tendency for easy bruising [FreeTextEntry5] : HTN, HLD [de-identified] : left 2nd digit DFU3 s/p partial toe amputation , no open wound closed  [de-identified] : IDDM with neuropathy  [FreeTextEntry9] : hammer toes , previous amp of the left foot 4th toe  [de-identified] : MARILUZ

## 2020-01-14 NOTE — PLAN
[FreeTextEntry1] : Patient happy with the results , no open wounds , patient discharged with full instructions

## 2020-01-17 DIAGNOSIS — Z87.81 PERSONAL HISTORY OF (HEALED) TRAUMATIC FRACTURE: ICD-10-CM

## 2020-01-17 DIAGNOSIS — Z94.0 KIDNEY TRANSPLANT STATUS: ICD-10-CM

## 2020-01-17 DIAGNOSIS — E78.5 HYPERLIPIDEMIA, UNSPECIFIED: ICD-10-CM

## 2020-01-17 DIAGNOSIS — E11.40 TYPE 2 DIABETES MELLITUS WITH DIABETIC NEUROPATHY, UNSPECIFIED: ICD-10-CM

## 2020-01-17 DIAGNOSIS — Z86.73 PERSONAL HISTORY OF TRANSIENT ISCHEMIC ATTACK (TIA), AND CEREBRAL INFARCTION WITHOUT RESIDUAL DEFICITS: ICD-10-CM

## 2020-01-17 DIAGNOSIS — Z86.718 PERSONAL HISTORY OF OTHER VENOUS THROMBOSIS AND EMBOLISM: ICD-10-CM

## 2020-01-17 DIAGNOSIS — Z85.828 PERSONAL HISTORY OF OTHER MALIGNANT NEOPLASM OF SKIN: ICD-10-CM

## 2020-01-17 DIAGNOSIS — Z80.8 FAMILY HISTORY OF MALIGNANT NEOPLASM OF OTHER ORGANS OR SYSTEMS: ICD-10-CM

## 2020-01-17 DIAGNOSIS — Z89.422 ACQUIRED ABSENCE OF OTHER LEFT TOE(S): ICD-10-CM

## 2020-01-17 DIAGNOSIS — Z79.4 LONG TERM (CURRENT) USE OF INSULIN: ICD-10-CM

## 2020-01-17 DIAGNOSIS — G47.33 OBSTRUCTIVE SLEEP APNEA (ADULT) (PEDIATRIC): ICD-10-CM

## 2020-01-17 DIAGNOSIS — E11.22 TYPE 2 DIABETES MELLITUS WITH DIABETIC CHRONIC KIDNEY DISEASE: ICD-10-CM

## 2020-01-17 DIAGNOSIS — N18.3 CHRONIC KIDNEY DISEASE, STAGE 3 (MODERATE): ICD-10-CM

## 2020-01-17 DIAGNOSIS — Z83.3 FAMILY HISTORY OF DIABETES MELLITUS: ICD-10-CM

## 2020-01-17 DIAGNOSIS — Z80.3 FAMILY HISTORY OF MALIGNANT NEOPLASM OF BREAST: ICD-10-CM

## 2020-01-17 DIAGNOSIS — E11.69 TYPE 2 DIABETES MELLITUS WITH OTHER SPECIFIED COMPLICATION: ICD-10-CM

## 2020-01-17 DIAGNOSIS — M86.672 OTHER CHRONIC OSTEOMYELITIS, LEFT ANKLE AND FOOT: ICD-10-CM

## 2020-01-17 DIAGNOSIS — I12.9 HYPERTENSIVE CHRONIC KIDNEY DISEASE WITH STAGE 1 THROUGH STAGE 4 CHRONIC KIDNEY DISEASE, OR UNSPECIFIED CHRONIC KIDNEY DISEASE: ICD-10-CM

## 2020-01-17 DIAGNOSIS — Z79.899 OTHER LONG TERM (CURRENT) DRUG THERAPY: ICD-10-CM

## 2020-01-28 NOTE — OCCUPATIONAL THERAPY INITIAL EVALUATION ADULT - VISUAL ASSESSMENT: VISUAL FIELD CUTS
Patient's mother Sharon called back. She needs a letter with patient's list of medications for treating her influenza /sinus symptoms. Paige thought that she had sent a long Mychart with the list of medications. It was not received. Reviewed list with mother. Letter pended. Flonase is reported by mother. Not on current medication list. Please update letter. Taking Sudafed differently than on med list. See dose on letter.     Mother is bringing the patient to the group home and needs the letter faxed by noon today. Mother apologized for the short notice. States that she thought that she sent a Mychart yesterday about this.     Group [facility]: [facility], [address], Fax [phone].     Patient's mother Paige states that the doctor at the  did not swab Yudi for influenza because she had the obvious symptoms and did not want to put the patient through all of that.    Patient was started on Tamiflu. She only took one dose because the patient had terrible abdominal pain after taking it.    Gave Paige the message that Dr. Mills advised CT of sinus for recurrent sinus infections. Paige sent Mychart to patient's neurologist and is still waiting to hear back.    Please sign and fax letter as soon as possible. There are also 2 Mychart messages related to this telephone encounter. Melly Garcia RN         not observed

## 2020-01-31 ENCOUNTER — EMERGENCY (EMERGENCY)
Facility: HOSPITAL | Age: 55
LOS: 1 days | Discharge: HOME | End: 2020-01-31
Attending: STUDENT IN AN ORGANIZED HEALTH CARE EDUCATION/TRAINING PROGRAM
Payer: MEDICARE

## 2020-01-31 VITALS
RESPIRATION RATE: 18 BRPM | WEIGHT: 300.05 LBS | HEART RATE: 67 BPM | SYSTOLIC BLOOD PRESSURE: 184 MMHG | DIASTOLIC BLOOD PRESSURE: 92 MMHG | OXYGEN SATURATION: 96 % | TEMPERATURE: 98 F | HEIGHT: 73 IN

## 2020-01-31 DIAGNOSIS — N18.6 END STAGE RENAL DISEASE: Chronic | ICD-10-CM

## 2020-01-31 LAB
ALBUMIN SERPL ELPH-MCNC: 4.2 G/DL — SIGNIFICANT CHANGE UP (ref 3.3–5)
ALP SERPL-CCNC: 107 U/L — SIGNIFICANT CHANGE UP (ref 40–120)
ALT FLD-CCNC: 12 U/L — SIGNIFICANT CHANGE UP (ref 10–45)
ANION GAP SERPL CALC-SCNC: 11 MMOL/L — SIGNIFICANT CHANGE UP (ref 5–17)
AST SERPL-CCNC: 19 U/L — SIGNIFICANT CHANGE UP (ref 10–40)
BASOPHILS # BLD AUTO: 0.05 K/UL — SIGNIFICANT CHANGE UP (ref 0–0.2)
BASOPHILS NFR BLD AUTO: 0.6 % — SIGNIFICANT CHANGE UP (ref 0–2)
BILIRUB SERPL-MCNC: 0.4 MG/DL — SIGNIFICANT CHANGE UP (ref 0.2–1.2)
BUN SERPL-MCNC: 26 MG/DL — HIGH (ref 7–23)
CALCIUM SERPL-MCNC: 9.7 MG/DL — SIGNIFICANT CHANGE UP (ref 8.4–10.5)
CHLORIDE SERPL-SCNC: 100 MMOL/L — SIGNIFICANT CHANGE UP (ref 96–108)
CO2 SERPL-SCNC: 29 MMOL/L — SIGNIFICANT CHANGE UP (ref 22–31)
CREAT SERPL-MCNC: 1.29 MG/DL — SIGNIFICANT CHANGE UP (ref 0.5–1.3)
EOSINOPHIL # BLD AUTO: 0.22 K/UL — SIGNIFICANT CHANGE UP (ref 0–0.5)
EOSINOPHIL NFR BLD AUTO: 2.6 % — SIGNIFICANT CHANGE UP (ref 0–6)
GLUCOSE SERPL-MCNC: 88 MG/DL — SIGNIFICANT CHANGE UP (ref 70–99)
HCT VFR BLD CALC: 43.3 % — SIGNIFICANT CHANGE UP (ref 39–50)
HGB BLD-MCNC: 14.3 G/DL — SIGNIFICANT CHANGE UP (ref 13–17)
IMM GRANULOCYTES NFR BLD AUTO: 0.5 % — SIGNIFICANT CHANGE UP (ref 0–1.5)
LYMPHOCYTES # BLD AUTO: 1.47 K/UL — SIGNIFICANT CHANGE UP (ref 1–3.3)
LYMPHOCYTES # BLD AUTO: 17.1 % — SIGNIFICANT CHANGE UP (ref 13–44)
MCHC RBC-ENTMCNC: 29.2 PG — SIGNIFICANT CHANGE UP (ref 27–34)
MCHC RBC-ENTMCNC: 33 GM/DL — SIGNIFICANT CHANGE UP (ref 32–36)
MCV RBC AUTO: 88.5 FL — SIGNIFICANT CHANGE UP (ref 80–100)
MONOCYTES # BLD AUTO: 0.65 K/UL — SIGNIFICANT CHANGE UP (ref 0–0.9)
MONOCYTES NFR BLD AUTO: 7.6 % — SIGNIFICANT CHANGE UP (ref 2–14)
NEUTROPHILS # BLD AUTO: 6.16 K/UL — SIGNIFICANT CHANGE UP (ref 1.8–7.4)
NEUTROPHILS NFR BLD AUTO: 71.6 % — SIGNIFICANT CHANGE UP (ref 43–77)
NRBC # BLD: 0 /100 WBCS — SIGNIFICANT CHANGE UP (ref 0–0)
PLATELET # BLD AUTO: 226 K/UL — SIGNIFICANT CHANGE UP (ref 150–400)
POTASSIUM SERPL-MCNC: 4.1 MMOL/L — SIGNIFICANT CHANGE UP (ref 3.5–5.3)
POTASSIUM SERPL-SCNC: 4.1 MMOL/L — SIGNIFICANT CHANGE UP (ref 3.5–5.3)
PROT SERPL-MCNC: 7.8 G/DL — SIGNIFICANT CHANGE UP (ref 6–8.3)
RBC # BLD: 4.89 M/UL — SIGNIFICANT CHANGE UP (ref 4.2–5.8)
RBC # FLD: 13.2 % — SIGNIFICANT CHANGE UP (ref 10.3–14.5)
SODIUM SERPL-SCNC: 140 MMOL/L — SIGNIFICANT CHANGE UP (ref 135–145)
WBC # BLD: 8.59 K/UL — SIGNIFICANT CHANGE UP (ref 3.8–10.5)
WBC # FLD AUTO: 8.59 K/UL — SIGNIFICANT CHANGE UP (ref 3.8–10.5)

## 2020-01-31 PROCEDURE — 99284 EMERGENCY DEPT VISIT MOD MDM: CPT | Mod: GC

## 2020-01-31 PROCEDURE — 81001 URINALYSIS AUTO W/SCOPE: CPT

## 2020-01-31 PROCEDURE — 76776 US EXAM K TRANSPL W/DOPPLER: CPT | Mod: 26,RT

## 2020-01-31 PROCEDURE — 76776 US EXAM K TRANSPL W/DOPPLER: CPT

## 2020-01-31 PROCEDURE — 80053 COMPREHEN METABOLIC PANEL: CPT

## 2020-01-31 PROCEDURE — 87086 URINE CULTURE/COLONY COUNT: CPT

## 2020-01-31 PROCEDURE — 85027 COMPLETE CBC AUTOMATED: CPT

## 2020-01-31 PROCEDURE — 80197 ASSAY OF TACROLIMUS: CPT

## 2020-01-31 PROCEDURE — 99284 EMERGENCY DEPT VISIT MOD MDM: CPT | Mod: 25

## 2020-01-31 RX ORDER — SODIUM CHLORIDE 9 MG/ML
1000 INJECTION INTRAMUSCULAR; INTRAVENOUS; SUBCUTANEOUS ONCE
Refills: 0 | Status: COMPLETED | OUTPATIENT
Start: 2020-01-31 | End: 2020-01-31

## 2020-01-31 NOTE — ED PROVIDER NOTE - PATIENT PORTAL LINK FT
You can access the FollowMyHealth Patient Portal offered by [facility] by registering at the following website: [email or web address]. By joining [employer]’s FollowMyHealth portal, you will also be able to view your health information using other applications (apps) compatible with our system.

## 2020-01-31 NOTE — ED PROVIDER NOTE - OBJECTIVE STATEMENT
Mikala Eldridge MD PGY-2 55 yo M with PMH kidney transplant (2013 with Dr. Navarro, on tacrolimus and azothiaprine), [place] CVA in 2015, parainfluenza with hx intubation, DM type 1, HTN, HLD, osteomyelitis s/p two toe amputations p/w hematuria and dysuria last night. Had one episode of bright red blood in urine with mild dysuria, increased urinary frequency and small volume urine. Dysuria resolved and urine today was "rust-colored". No fevers, no chills, no abdominal pain.

## 2020-01-31 NOTE — ED PROVIDER NOTE - CLINICAL SUMMARY MEDICAL DECISION MAKING FREE TEXT BOX
Mikala Eldridge MD PGY-2 53 yo M with PMH kidney transplant (2013 with Dr. Navarro, on tacrolimus and azothiaprine) and multiple other medical comorbidities p/w hematuria and dysuria last night. mild ttp in RLQ with R pelvic kidney. will get labs to eval Cr, UA/UCx to eval for UTI, duplex to eval for clots vs rejection, transplant surgery aware, will also inform house nephro

## 2020-01-31 NOTE — CONSULT NOTE ADULT - ASSESSMENT
54yM s/p LDRT 2013 w/ 2 days hematuria.    - F/u CBC, BMP, UA, and renal duplex  - Likely UTI, but need to r/o acute rejection  - If Cr is at baseline of 1.2 and renal duplex unremarkable, can f/u w/ Dr. Chi as out pt.  - Pt discussed w/ Dr. Salas  - Please page [phone] w/ any questions    NEWTON Roberts 54yM s/p LDRT 2013 w/ 2 days hematuria.    - CBC, BMP, UA, and renal duplex unremarkable  - Cr is at baseline and renal duplex did not indicate graft pathology. Can f/u w/ Dr. Chi as out pt.  - Pt discussed w/ Dr. Salas  - Please page [phone] w/ any questions    NEWTON Roberts

## 2020-01-31 NOTE — ED PROVIDER NOTE - PROGRESS NOTE DETAILS
Mikala Eldridge MD PGY-2 transplant sx aware, suggest adding tacrolimus level to labs Mikala Eldridge MD PGY-2 per transplant surgery, if duplex and UA unremarkable, can be discharged with f/u with outpt nephrologist. UA with small blood and protein and mildly elevated velocities on duplex. transplant surgery aware, ok for d/c. spoke with [place] transplant nephro as well earlier - was told to callback [phone] when labs result but attempted x 2 with busy signal. will attempt again

## 2020-01-31 NOTE — CONSULT NOTE ADULT - SUBJECTIVE AND OBJECTIVE BOX
Transplant Surgery Consult  Consulting attending: Dr. Salas    HPI: 54yM w/ PMH sig for IDDM, HTN, HLD, CVA 2015, ESRD s/p LDRT 2013. Pt presented to [facility] ED 1/31/2020 c/o 2 days of hematuria and dysuria. Pt states urine was punch colored on day prior to presentation and was rust colored on admission. Pt follows Dr. Chi for transplant nephrology last seen in office 12/9/2019. Takes Prograf 1.5mg BID and azathioprine 100mg QD. Baseline Cr 1.2. The patient denies fever, chills; chest pain, SOB, palpitation; dizziness, weakness; nausea, vomiting; diarrhea, constipation; abdominal pain; leg swelling; sick contacts; and recent travel.      PAST MEDICAL HISTORY:  CVA (cerebral vascular accident)  Obesity (BMI 30-39.9)  Diabetic peripheral neuropathy  CKD (chronic kidney disease)  Diabetes mellitus  Hyperlipidemia  Hypertension      PAST SURGICAL HISTORY:  End-stage renal failure with renal transplant  H/O foot surgery  Vasectomy status  Ear disease  Toe infection      MEDICATIONS:      ALLERGIES:  No Known Allergies      VITALS & I/Os:  Vital Signs Last 24 Hrs  T(C): 36.7 (31 Jan 2020 18:11), Max: 36.7 (31 Jan 2020 18:11)  T(F): 98 (31 Jan 2020 18:11), Max: 98 (31 Jan 2020 18:11)  HR: 67 (31 Jan 2020 18:11) (67 - 67)  BP: 184/92 (31 Jan 2020 18:11) (184/92 - 184/92)  BP(mean): --  RR: 18 (31 Jan 2020 18:11) (18 - 18)  SpO2: 96% (31 Jan 2020 18:11) (96% - 96%)    I&O's Summary      PHYSICAL EXAM:  General: No acute distress  Respiratory: Nonlabored  Cardiovascular: RRR  Abdominal: Soft, nondistended, mild TTP RLQ just superior to graft. No rebound or guarding. No organomegaly, no palpable mass.  Extremities: Warm    LABS:  Pending      IMAGING:  Pending Transplant Surgery Consult  Consulting attending: Dr. Salas    HPI: 54yM w/ PMH sig for IDDM, HTN, HLD, CVA 2015, ESRD s/p LDRT 2013. Pt presented to [facility] ED 1/31/2020 c/o 2 days of hematuria and dysuria. Pt states urine was punch colored on day prior to presentation and was rust colored on admission. Pt follows Dr. Chi for transplant nephrology last seen in office 12/9/2019. Takes Prograf 1.5mg BID and azathioprine 100mg QD. Baseline Cr 1.2. The patient denies fever, chills; chest pain, SOB, palpitation; dizziness, weakness; nausea, vomiting; diarrhea, constipation; abdominal pain; leg swelling; sick contacts; and recent travel.      PAST MEDICAL HISTORY:  CVA (cerebral vascular accident)  Obesity (BMI 30-39.9)  Diabetic peripheral neuropathy  CKD (chronic kidney disease)  Diabetes mellitus  Hyperlipidemia  Hypertension      PAST SURGICAL HISTORY:  End-stage renal failure with renal transplant  H/O foot surgery  Vasectomy status  Ear disease  Toe infection      MEDICATIONS:      ALLERGIES:  No Known Allergies      VITALS & I/Os:  Vital Signs Last 24 Hrs  T(C): 36.7 (31 Jan 2020 18:11), Max: 36.7 (31 Jan 2020 18:11)  T(F): 98 (31 Jan 2020 18:11), Max: 98 (31 Jan 2020 18:11)  HR: 67 (31 Jan 2020 18:11) (67 - 67)  BP: 184/92 (31 Jan 2020 18:11) (184/92 - 184/92)  BP(mean): --  RR: 18 (31 Jan 2020 18:11) (18 - 18)  SpO2: 96% (31 Jan 2020 18:11) (96% - 96%)    I&O's Summary      PHYSICAL EXAM:  General: No acute distress  Respiratory: Nonlabored  Cardiovascular: RRR  Abdominal: Soft, nondistended, mild TTP RLQ just superior to graft. No rebound or guarding. No organomegaly, no palpable mass.  Extremities: Warm    LABS:  CBC (01-31 @ 22:31)                              14.3                           8.59    )----------------(  226        71.6  % Neutrophils, 17.1  % Lymphocytes, ANC: 6.16                                43.3                  BMP (01-31 @ 22:31)             140     |  100     |  26<H> 		Ca++ --      Ca 9.7                ---------------------------------( 88    		Mg --                 4.1     |  29      |  1.29  			Ph --        LFTs (01-31 @ 22:31)      TPro 7.8 / Alb 4.2 / TBili 0.4 / DBili -- / AST 19 / ALT 12 / AlkPhos 107        IMAGING:  < from: US Trans Kidney w/ Doppler, Right (01.31.20 @ 23:07) >  IMPRESSION:    No evidence of transplant hydronephrosis or perinephric collection.   Normal disrupted vascular flow. Mildly elevated velocities ason prior study.    < end of copied text >

## 2020-01-31 NOTE — ED PROVIDER NOTE - ATTENDING CONTRIBUTION TO CARE
hx of kidney transplant,   isolated episode of hematuria approx 24 hours ago Bright red,  then rust colored now has cleared up  no red foods, no blood thinners  pt accompanied w/ wife, nontoxic appearing no abd pain, no flank pain  will send labs, and obtain imaging to eval the kidney  likely dc w/ outpt urology follow up.

## 2020-01-31 NOTE — ED PROVIDER NOTE - PHYSICAL EXAMINATION
PHYSICAL EXAM:   General: well-appearing, appears stated age, in no acute distress  HEENT: NC/AT, airway patent  Cardiovascular: regular rate and rhythm, + S1/S2, no murmurs, rubs, gallops appreciated  Respiratory: clear to auscultation bilaterally, good aeration bilaterally, nonlabored respirations  Abdominal: soft, mild RLQ ttp, no rebound, guarding or rigidity  Psychiatric: appropriate mood and affect.   -Mikala Eldridge PGY-2

## 2020-01-31 NOTE — ED PROVIDER NOTE - NS ED ROS FT
REVIEW OF SYSTEMS:  General:  no fever, no chills  Cardiac: no chest pain  Respiratory: no shortness of breath  Gastrointestinal: no abdominal pain,   Genitourinary: + hematuria, + dysuria, + urinary frequency  -Mikala Eldridge PGY-2

## 2020-01-31 NOTE — ED PROVIDER NOTE - NSFOLLOWUPINSTRUCTIONS_ED_ALL_ED_FT
1. You were seen in the Emergency Room for blood in urine and burning with urination. There was some blood and protein in the urine, but no signs of infection. if the urine culture comes back positive, you will be called with results and given antibiotics accordingly.  2. Please follow up with your nephrologist doctor. Please call on Monday and make an appointment to be seen.   3. Return to the emergency room or seek immediate assistance for any new or concerning symptoms (such as fevers, chills,  worsening urinary symptoms, abdominal pain, nausea vomiting ), or if you get worse.   4. Copies of your tests were provided to you for follow-up.  You must address all your findings with your doctor.     If your Tacrolimus level comes back abnormal, you will also be called 1. You were seen in the Emergency Room for blood in urine and burning with urination. There was some blood and protein in the urine, but no signs of infection. If the urine culture comes back positive, you will be called with results and given antibiotics accordingly.  2. Please follow up with your nephrologist doctor. Please call him on Monday and make an appointment to be seen.   3. Return to the emergency room or seek immediate assistance for any new or concerning symptoms (such as fevers, chills,  worsening urinary symptoms, abdominal pain, nausea vomiting ), or if you get worse.   4. Copies of your tests were provided to you for follow-up.  You must address all your findings with your doctor. Your ultrasound showed mildly elevated blood flow velocities but otherwise was unremarkable. Please show this to your nephrologist    If your Tacrolimus level comes back abnormal, you will also be called

## 2020-02-01 VITALS
SYSTOLIC BLOOD PRESSURE: 143 MMHG | HEART RATE: 67 BPM | TEMPERATURE: 98 F | RESPIRATION RATE: 18 BRPM | DIASTOLIC BLOOD PRESSURE: 86 MMHG | OXYGEN SATURATION: 95 %

## 2020-02-01 LAB
APPEARANCE UR: CLEAR — SIGNIFICANT CHANGE UP
BACTERIA # UR AUTO: NEGATIVE — SIGNIFICANT CHANGE UP
BILIRUB UR-MCNC: NEGATIVE — SIGNIFICANT CHANGE UP
COLOR SPEC: YELLOW — SIGNIFICANT CHANGE UP
DIFF PNL FLD: ABNORMAL
EPI CELLS # UR: 1 /HPF — SIGNIFICANT CHANGE UP
GLUCOSE UR QL: NEGATIVE — SIGNIFICANT CHANGE UP
HYALINE CASTS # UR AUTO: 1 /LPF — SIGNIFICANT CHANGE UP (ref 0–2)
KETONES UR-MCNC: NEGATIVE — SIGNIFICANT CHANGE UP
LEUKOCYTE ESTERASE UR-ACNC: NEGATIVE — SIGNIFICANT CHANGE UP
NITRITE UR-MCNC: NEGATIVE — SIGNIFICANT CHANGE UP
PH UR: 6 — SIGNIFICANT CHANGE UP (ref 5–8)
PROT UR-MCNC: ABNORMAL
RBC CASTS # UR COMP ASSIST: 7 /HPF — HIGH (ref 0–4)
SP GR SPEC: 1.01 — SIGNIFICANT CHANGE UP (ref 1.01–1.02)
TACROLIMUS SERPL-MCNC: 8.9 NG/ML — SIGNIFICANT CHANGE UP
UROBILINOGEN FLD QL: NEGATIVE — SIGNIFICANT CHANGE UP
WBC UR QL: 3 /HPF — SIGNIFICANT CHANGE UP (ref 0–5)

## 2020-02-01 NOTE — ED ADULT NURSE REASSESSMENT NOTE - NS ED NURSE REASSESS COMMENT FT1
Pt AAOx4. Pt aware of plan to wait for urine and US results. Pt IV not placed. Pt will have PO water instead. Pt given 3 cups of water right now. Pt also given crackers. Pt denies any pain at this time. Pt has no other complaints.

## 2020-02-01 NOTE — ED ADULT NURSE NOTE - NSIMPLEMENTINTERV_GEN_ALL_ED
Implemented All Universal Safety Interventions:  [place] to call system. Call bell, personal items and telephone within reach. Instruct patient to call for assistance. Room bathroom lighting operational. Non-slip footwear when patient is off stretcher. Physically safe environment: no spills, clutter or unnecessary equipment. Stretcher in lowest position, wheels locked, appropriate side rails in place.

## 2020-02-01 NOTE — ED ADULT NURSE NOTE - OBJECTIVE STATEMENT
54yM presents to the ED with complaints of hematuria x1day. PMH of kidney transplant (2013) on antirejection medication and is compliant. Pt states he started having hematuria yesterday. Pt was concerned and called his nephrologist who informed him to come into the ED for a f/u. Pt denies any pain or discomfort at this time. Pt has no abdominal or back pain. Pt has no decreased urine output, burning on urination, fevers, chills, cough, SOB, CP, headache, N/V, C/D, numbness/tingling, weakness. Pt has no other complaints at this time. Pt AAOx4. Pt ambulatory. Family at bedside. Will continue to monitor. 54yM presents to the ED with complaints of hematuria x1day. PMH of kidney transplant (2013) on antirejection medication and is compliant. Pt states he started having hematuria yesterday. Pt was concerned and called his nephrologist who informed him to come into the ED for a f/u. Pt also had slight burning on urination. Pt denies any pain or discomfort at this time. Pt has no abdominal or back pain. Pt has no decreased urine output, fevers, chills, cough, SOB, CP, headache, N/V, C/D, numbness/tingling, weakness. Pt has no other complaints at this time. Pt AAOx4. Pt ambulatory. Family at bedside. Will continue to monitor.

## 2020-02-02 LAB
CULTURE RESULTS: SIGNIFICANT CHANGE UP
SPECIMEN SOURCE: SIGNIFICANT CHANGE UP

## 2020-06-01 ENCOUNTER — OUTPATIENT (OUTPATIENT)
Dept: OUTPATIENT SERVICES | Facility: HOSPITAL | Age: 55
LOS: 1 days | Discharge: HOME | End: 2020-06-01
Payer: MEDICARE

## 2020-06-01 ENCOUNTER — APPOINTMENT (OUTPATIENT)
Dept: OBGYN | Facility: HOSPITAL | Age: 55
End: 2020-06-01
Payer: MEDICARE

## 2020-06-01 VITALS
HEART RATE: 60 BPM | SYSTOLIC BLOOD PRESSURE: 178 MMHG | HEIGHT: 73 IN | TEMPERATURE: 97 F | WEIGHT: 315 LBS | OXYGEN SATURATION: 96 % | DIASTOLIC BLOOD PRESSURE: 74 MMHG | BODY MASS INDEX: 41.75 KG/M2

## 2020-06-01 DIAGNOSIS — L97.801 NON-PRESSURE CHRONIC ULCER OF OTHER PART OF UNSPECIFIED LOWER LEG LIMITED TO BREAKDOWN OF SKIN: ICD-10-CM

## 2020-06-01 DIAGNOSIS — N18.6 END STAGE RENAL DISEASE: Chronic | ICD-10-CM

## 2020-06-01 PROCEDURE — 99214 OFFICE O/P EST MOD 30 MIN: CPT

## 2020-06-01 PROCEDURE — G0463: CPT

## 2020-06-01 NOTE — REVIEW OF SYSTEMS
Epidermal Closure Graft Donor Site (Optional): simple interrupted [Joint Stiffness] : joint stiffness [Skin Wound] : skin wound [Easy Bruising] : a tendency for easy bruising [Fever] : no fever [Chills] : no chills [Eye Pain] : no eye pain [Eyesight Problems] : no eyesight problems [Earache] : no earache [Nosebleeds] : no nosebleeds [Chest Pain] : no chest pain [Shortness Of Breath] : no shortness of breath [Cough] : no cough [Abdominal Pain] : no abdominal pain [Vomiting] : no vomiting [Anxiety] : no anxiety [FreeTextEntry5] : HTN, HLD [de-identified] : left 2nd digit DFU3 s/p partial toe amputation , no open wound closed  [FreeTextEntry9] : hammer toes , previous amp of the left foot 4th toe  [de-identified] : IDDM with neuropathy  [de-identified] : MARILUZ

## 2020-06-01 NOTE — ASSESSMENT
[Verbal] : Verbal [Demo] : Demo [Written] : Written [Verbalizes knowledge/Understanding] : Verbalizes knowledge/understanding [Good - alert, interested, motivated] : Good - alert, interested, motivated [Patient] : Patient [Signs and symptoms of infection] : sign and symptoms of infection [Skin Care] : skin care [Dressing changes] : dressing changes [Venous Disease] : venous disease [Pain Management] : pain management [Compression Therapy] : compression therapy [How and When to Call] : how and when to call [Patient responsibility to plan of care] : patient responsibility to plan of care [] : Yes [Stable] : stable [Home] : Home [Ambulatory] : Ambulatory [Not Applicable - Long Term Care/Home Health Agency] : Long Term Care/Home Health Agency: Not Applicable [FreeTextEntry2] : Infection prevention\par Localized wound care \par Goal of remaining pain free regarding wounds.\par Compression therapy \par \par  [FreeTextEntry4] : Auth submitted for vascular studies per protocol \par Patient instructed to purchase over the counter compression stockings \par Follow up in 1 week

## 2020-06-01 NOTE — PHYSICAL EXAM
[Normal Thyroid] : the thyroid was normal [Normal Breath Sounds] : Normal breath sounds [Normal Heart Sounds] : normal heart sounds [Normal Rate and Rhythm] : normal rate and rhythm [Alert] : alert [Oriented to Person] : oriented to person [Oriented to Place] : oriented to place [Calm] : calm [Oriented to Time] : oriented to time [Ankle Swelling (On Exam)] : present [Ankle Swelling Bilaterally] : bilaterally  [Ankle Swelling On The Left] : moderate [2 x 2] : 2 x 2  [JVD] : no jugular venous distention  [] : not present [Abdomen Masses] : No abdominal massess [Abdomen Tenderness] : ~T ~M No abdominal tenderness [Tender] : nontender [Enlarged] : not enlarged [de-identified] : adult WM, NAD, alert, Ox3. [FreeTextEntry2] : 5.2 [FreeTextEntry1] : Right medial leg - A few areas of eschar/scabs within measurement.  [FreeTextEntry4] : 0.1 [FreeTextEntry3] : 4.8 [de-identified] : Alginate  [de-identified] : Scant Serous/sanguinous [de-identified] : Cleansed with NS\par Tegaderm  [de-identified] : Dorsalis Pedis: +2 Bilateral \par Posterior Tibialis: +1 Bilateral \par Doppler pulses:  N/A\par Extremity color: warm \par Extremity temperature: Pink \par Capillary refill: < 3 sec\par PHUONG: Auth submitted per protocol. \par  [de-identified] : None [de-identified] : Normal [de-identified] : None [de-identified] : >75% [de-identified] : No [de-identified] : 3x Weekly [de-identified] : Primary Dressing

## 2020-06-01 NOTE — REASON FOR VISIT
[Consultation] : a consultation visit [FreeTextEntry1] : Patient states approximately 1 week ago patient hit his leg on something outside while gardening and cut his right leg. He cleaned with peroxide at the time of injury and applied bacitracin. Patient had a kidney transplant 2013 and is a diabetic so he was concerned of wound becoming infected.

## 2020-06-01 NOTE — HISTORY OF PRESENT ILLNESS
[FreeTextEntry1] : 53 yo WM, last seen here at the [facility] in 1/20, here now for wounds that developed about 1 wk. ago while working outdoors in his garden when he may have scraped the RLE. Only a few scattered eschars seen.

## 2020-06-02 ENCOUNTER — APPOINTMENT (OUTPATIENT)
Dept: OBGYN | Facility: HOSPITAL | Age: 55
End: 2020-06-02

## 2020-06-02 DIAGNOSIS — Z79.899 OTHER LONG TERM (CURRENT) DRUG THERAPY: ICD-10-CM

## 2020-06-02 DIAGNOSIS — E11.40 TYPE 2 DIABETES MELLITUS WITH DIABETIC NEUROPATHY, UNSPECIFIED: ICD-10-CM

## 2020-06-02 DIAGNOSIS — G47.33 OBSTRUCTIVE SLEEP APNEA (ADULT) (PEDIATRIC): ICD-10-CM

## 2020-06-02 DIAGNOSIS — Z80.3 FAMILY HISTORY OF MALIGNANT NEOPLASM OF BREAST: ICD-10-CM

## 2020-06-02 DIAGNOSIS — Z85.828 PERSONAL HISTORY OF OTHER MALIGNANT NEOPLASM OF SKIN: ICD-10-CM

## 2020-06-02 DIAGNOSIS — Z87.81 PERSONAL HISTORY OF (HEALED) TRAUMATIC FRACTURE: ICD-10-CM

## 2020-06-02 DIAGNOSIS — I12.9 HYPERTENSIVE CHRONIC KIDNEY DISEASE WITH STAGE 1 THROUGH STAGE 4 CHRONIC KIDNEY DISEASE, OR UNSPECIFIED CHRONIC KIDNEY DISEASE: ICD-10-CM
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
## 2020-06-02 DIAGNOSIS — Z86.718 PERSONAL HISTORY OF OTHER VENOUS THROMBOSIS AND EMBOLISM: ICD-10-CM

## 2020-06-02 DIAGNOSIS — Z89.422 ACQUIRED ABSENCE OF OTHER LEFT TOE(S): ICD-10-CM

## 2020-06-02 DIAGNOSIS — E11.22 TYPE 2 DIABETES MELLITUS WITH DIABETIC CHRONIC KIDNEY DISEASE: ICD-10-CM

## 2020-06-02 DIAGNOSIS — Z79.82 LONG TERM (CURRENT) USE OF ASPIRIN: ICD-10-CM

## 2020-06-02 DIAGNOSIS — Z79.4 LONG TERM (CURRENT) USE OF INSULIN: ICD-10-CM
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
## 2020-06-02 DIAGNOSIS — Z86.73 PERSONAL HISTORY OF TRANSIENT ISCHEMIC ATTACK (TIA), AND CEREBRAL INFARCTION WITHOUT RESIDUAL DEFICITS: ICD-10-CM

## 2020-06-02 DIAGNOSIS — Z83.3 FAMILY HISTORY OF DIABETES MELLITUS: ICD-10-CM

## 2020-06-02 DIAGNOSIS — S81.801A UNSPECIFIED OPEN WOUND, RIGHT LOWER LEG, INITIAL ENCOUNTER: ICD-10-CM

## 2020-06-02 DIAGNOSIS — E78.5 HYPERLIPIDEMIA, UNSPECIFIED: ICD-10-CM
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
## 2020-06-02 DIAGNOSIS — Z94.0 KIDNEY TRANSPLANT STATUS: ICD-10-CM

## 2020-06-02 DIAGNOSIS — N18.3 CHRONIC KIDNEY DISEASE, STAGE 3 (MODERATE): ICD-10-CM

## 2020-06-02 DIAGNOSIS — Z80.8 FAMILY HISTORY OF MALIGNANT NEOPLASM OF OTHER ORGANS OR SYSTEMS: ICD-10-CM

## 2020-06-08 ENCOUNTER — APPOINTMENT (OUTPATIENT)
Dept: PLASTIC SURGERY | Facility: HOSPITAL | Age: 55
End: 2020-06-08
Payer: MEDICARE

## 2020-06-08 ENCOUNTER — APPOINTMENT (OUTPATIENT)
Dept: OBGYN | Facility: HOSPITAL | Age: 55
End: 2020-06-08

## 2020-06-08 ENCOUNTER — OUTPATIENT (OUTPATIENT)
Dept: OUTPATIENT SERVICES | Facility: HOSPITAL | Age: 55
LOS: 1 days | Discharge: HOME | End: 2020-06-08
Payer: MEDICARE

## 2020-06-08 VITALS
TEMPERATURE: 97.4 F | RESPIRATION RATE: 20 BRPM | OXYGEN SATURATION: 94 % | HEIGHT: 73 IN | SYSTOLIC BLOOD PRESSURE: 141 MMHG | BODY MASS INDEX: 41.75 KG/M2 | WEIGHT: 315 LBS | HEART RATE: 63 BPM | DIASTOLIC BLOOD PRESSURE: 68 MMHG

## 2020-06-08 DIAGNOSIS — Z89.422 ACQUIRED ABSENCE OF OTHER LEFT TOE(S): ICD-10-CM

## 2020-06-08 DIAGNOSIS — Z79.4 LONG TERM (CURRENT) USE OF INSULIN: ICD-10-CM
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
## 2020-06-08 DIAGNOSIS — E11.40 TYPE 2 DIABETES MELLITUS WITH DIABETIC NEUROPATHY, UNSPECIFIED: ICD-10-CM

## 2020-06-08 DIAGNOSIS — Z79.899 OTHER LONG TERM (CURRENT) DRUG THERAPY: ICD-10-CM

## 2020-06-08 DIAGNOSIS — Z86.73 PERSONAL HISTORY OF TRANSIENT ISCHEMIC ATTACK (TIA), AND CEREBRAL INFARCTION WITHOUT RESIDUAL DEFICITS: ICD-10-CM
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
## 2020-06-08 DIAGNOSIS — Z79.82 LONG TERM (CURRENT) USE OF ASPIRIN: ICD-10-CM

## 2020-06-08 DIAGNOSIS — S81.801D UNSPECIFIED OPEN WOUND, RIGHT LOWER LEG, SUBSEQUENT ENCOUNTER: ICD-10-CM

## 2020-06-08 DIAGNOSIS — Z87.81 PERSONAL HISTORY OF (HEALED) TRAUMATIC FRACTURE: ICD-10-CM

## 2020-06-08 DIAGNOSIS — Z80.3 FAMILY HISTORY OF MALIGNANT NEOPLASM OF BREAST: ICD-10-CM

## 2020-06-08 DIAGNOSIS — G47.33 OBSTRUCTIVE SLEEP APNEA (ADULT) (PEDIATRIC): ICD-10-CM

## 2020-06-08 DIAGNOSIS — N18.6 END STAGE RENAL DISEASE: Chronic | ICD-10-CM

## 2020-06-08 DIAGNOSIS — Z85.828 PERSONAL HISTORY OF OTHER MALIGNANT NEOPLASM OF SKIN: ICD-10-CM
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
## 2020-06-08 DIAGNOSIS — I12.9 HYPERTENSIVE CHRONIC KIDNEY DISEASE WITH STAGE 1 THROUGH STAGE 4 CHRONIC KIDNEY DISEASE, OR UNSPECIFIED CHRONIC KIDNEY DISEASE: ICD-10-CM

## 2020-06-08 DIAGNOSIS — Z83.3 FAMILY HISTORY OF DIABETES MELLITUS: ICD-10-CM

## 2020-06-08 DIAGNOSIS — Z94.0 KIDNEY TRANSPLANT STATUS: ICD-10-CM

## 2020-06-08 DIAGNOSIS — E78.5 HYPERLIPIDEMIA, UNSPECIFIED: ICD-10-CM

## 2020-06-08 DIAGNOSIS — E11.22 TYPE 2 DIABETES MELLITUS WITH DIABETIC CHRONIC KIDNEY DISEASE: ICD-10-CM
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
## 2020-06-08 DIAGNOSIS — N18.3 CHRONIC KIDNEY DISEASE, STAGE 3 (MODERATE): ICD-10-CM

## 2020-06-08 DIAGNOSIS — Z86.718 PERSONAL HISTORY OF OTHER VENOUS THROMBOSIS AND EMBOLISM: ICD-10-CM

## 2020-06-08 DIAGNOSIS — Z80.8 FAMILY HISTORY OF MALIGNANT NEOPLASM OF OTHER ORGANS OR SYSTEMS: ICD-10-CM

## 2020-06-08 PROCEDURE — 99213 OFFICE O/P EST LOW 20 MIN: CPT

## 2020-06-08 PROCEDURE — G0463: CPT

## 2020-06-08 NOTE — HISTORY OF PRESENT ILLNESS
[FreeTextEntry1] : 55 yo WM, last seen here at the [facility] in 1/20, here now for wounds that developed about 1 wk. ago while working outdoors in his garden when he may have scraped the RLE. Only a few scattered eschars seen.\par 6/8/20 small scab on right lower leg noted

## 2020-06-08 NOTE — REVIEW OF SYSTEMS
[Fever] : no fever [Chills] : no chills [Eye Pain] : no eye pain [Eyesight Problems] : no eyesight problems [Earache] : no earache [Nosebleeds] : no nosebleeds [Chest Pain] : no chest pain [Shortness Of Breath] : no shortness of breath [Cough] : no cough [Abdominal Pain] : no abdominal pain [Vomiting] : no vomiting [Joint Stiffness] : joint stiffness [Skin Wound] : skin wound [Anxiety] : no anxiety [Easy Bruising] : a tendency for easy bruising [FreeTextEntry5] : HTN, HLD [FreeTextEntry9] : hammer toes , previous amp of the left foot 4th toe  [de-identified] : left 2nd digit DFU3 s/p partial toe amputation , no open wound closed  [de-identified] : IDDM with neuropathy  [de-identified] : MARILUZ

## 2020-06-08 NOTE — PHYSICAL EXAM
[2 x 2] : 2 x 2  [JVD] : no jugular venous distention  [Normal Thyroid] : the thyroid was normal [Normal Breath Sounds] : Normal breath sounds [Normal Heart Sounds] : normal heart sounds [Normal Rate and Rhythm] : normal rate and rhythm [Ankle Swelling (On Exam)] : present [Ankle Swelling Bilaterally] : bilaterally  [Ankle Swelling On The Left] : moderate [] : bilaterally [Abdomen Masses] : No abdominal massess [Abdomen Tenderness] : ~T ~M No abdominal tenderness [Tender] : nontender [Enlarged] : not enlarged [Alert] : alert [Oriented to Person] : oriented to person [Oriented to Place] : oriented to place [Oriented to Time] : oriented to time [Calm] : calm [de-identified] : adult WM, NAD, alert, Ox3. [FreeTextEntry1] : Right medial leg - Dry eschar [FreeTextEntry2] : 1.2 [FreeTextEntry3] : 0.5 [FreeTextEntry4] : 0.1 [de-identified] : Chito Islas  [de-identified] : Cleansed with Normal Saline. \par  [de-identified] : Dorsalis Pedis: +2 Bilateral \par Posterior Tibialis: +1 Bilateral \par Doppler pulses:  N/A\par Extremity color: warm \par Extremity temperature: Pink \par Capillary refill: < 3 sec\par PHUONG: Auth submitted per protocol. \par  [TWNoteComboBox4] : None [de-identified] : Normal [de-identified] : None [de-identified] : None [de-identified] : >75% [de-identified] : No [de-identified] : 3x Weekly [de-identified] : Primary Dressing

## 2020-06-08 NOTE — ASSESSMENT
[Verbal] : Verbal [Written] : Written [Demo] : Demo [Patient] : Patient [Good - alert, interested, motivated] : Good - alert, interested, motivated [Verbalizes knowledge/Understanding] : Verbalizes knowledge/understanding [Dressing changes] : dressing changes [Signs and symptoms of infection] : sign and symptoms of infection [Skin Care] : skin care [Venous Disease] : venous disease [How and When to Call] : how and when to call [Pain Management] : pain management [Patient responsibility to plan of care] : patient responsibility to plan of care [Stable] : stable [Home] : Home [Ambulatory] : Ambulatory [] : No [FreeTextEntry2] : Localized wound care \par Maintain Skin Integrity \par \par \par \par  [FreeTextEntry4] : Follow up in 2 weeks

## 2020-06-22 ENCOUNTER — APPOINTMENT (OUTPATIENT)
Dept: PLASTIC SURGERY | Facility: HOSPITAL | Age: 55
End: 2020-06-22

## 2020-08-25 ENCOUNTER — OUTPATIENT (OUTPATIENT)
Dept: OUTPATIENT SERVICES | Facility: HOSPITAL | Age: 55
LOS: 1 days | Discharge: HOME | End: 2020-08-25

## 2020-08-25 ENCOUNTER — APPOINTMENT (OUTPATIENT)
Dept: PODIATRY | Facility: HOSPITAL | Age: 55
End: 2020-08-25
Payer: MEDICARE

## 2020-08-25 VITALS
DIASTOLIC BLOOD PRESSURE: 81 MMHG | BODY MASS INDEX: 41.75 KG/M2 | TEMPERATURE: 98.9 F | HEART RATE: 62 BPM | WEIGHT: 315 LBS | SYSTOLIC BLOOD PRESSURE: 156 MMHG | OXYGEN SATURATION: 97 % | RESPIRATION RATE: 20 BRPM | HEIGHT: 73 IN

## 2020-08-25 DIAGNOSIS — N18.6 END STAGE RENAL DISEASE: Chronic | ICD-10-CM

## 2020-08-25 DIAGNOSIS — L97.501 NON-PRESSURE CHRONIC ULCER OF OTHER PART OF UNSPECIFIED FOOT LIMITED TO BREAKDOWN OF SKIN: ICD-10-CM

## 2020-08-25 PROCEDURE — 99214 OFFICE O/P EST MOD 30 MIN: CPT

## 2020-08-26 ENCOUNTER — INPATIENT (INPATIENT)
Facility: HOSPITAL | Age: 55
LOS: 4 days | Discharge: HOME | DRG: 617 | End: 2020-08-31
Attending: INTERNAL MEDICINE | Admitting: INTERNAL MEDICINE
Payer: MEDICARE

## 2020-08-26 ENCOUNTER — TRANSCRIPTION ENCOUNTER (OUTPATIENT)
Age: 55
End: 2020-08-26

## 2020-08-26 VITALS
RESPIRATION RATE: 18 BRPM | OXYGEN SATURATION: 94 % | DIASTOLIC BLOOD PRESSURE: 71 MMHG | WEIGHT: 311.95 LBS | SYSTOLIC BLOOD PRESSURE: 129 MMHG | TEMPERATURE: 99 F | HEART RATE: 64 BPM

## 2020-08-26 DIAGNOSIS — Z29.9 ENCOUNTER FOR PROPHYLACTIC MEASURES, UNSPECIFIED: ICD-10-CM

## 2020-08-26 DIAGNOSIS — M86.9 OSTEOMYELITIS, UNSPECIFIED: ICD-10-CM

## 2020-08-26 DIAGNOSIS — E11.621 TYPE 2 DIABETES MELLITUS WITH FOOT ULCER: ICD-10-CM

## 2020-08-26 DIAGNOSIS — N18.6 END STAGE RENAL DISEASE: Chronic | ICD-10-CM

## 2020-08-26 DIAGNOSIS — I10 ESSENTIAL (PRIMARY) HYPERTENSION: ICD-10-CM

## 2020-08-26 DIAGNOSIS — E10.22 TYPE 1 DIABETES MELLITUS WITH DIABETIC CHRONIC KIDNEY DISEASE: ICD-10-CM

## 2020-08-26 DIAGNOSIS — E78.5 HYPERLIPIDEMIA, UNSPECIFIED: ICD-10-CM

## 2020-08-26 DIAGNOSIS — E11.42 TYPE 2 DIABETES MELLITUS WITH DIABETIC POLYNEUROPATHY: ICD-10-CM

## 2020-08-26 DIAGNOSIS — I63.9 CEREBRAL INFARCTION, UNSPECIFIED: ICD-10-CM

## 2020-08-26 DIAGNOSIS — E11.9 TYPE 2 DIABETES MELLITUS WITHOUT COMPLICATIONS: ICD-10-CM

## 2020-08-26 DIAGNOSIS — Z94.0 KIDNEY TRANSPLANT STATUS: ICD-10-CM

## 2020-08-26 DIAGNOSIS — N18.9 CHRONIC KIDNEY DISEASE, UNSPECIFIED: ICD-10-CM

## 2020-08-26 DIAGNOSIS — Z94.0 KIDNEY TRANSPLANT STATUS: Chronic | ICD-10-CM

## 2020-08-26 LAB
A1C WITH ESTIMATED AVERAGE GLUCOSE RESULT: 8.2 % — HIGH (ref 4–5.6)
ALBUMIN SERPL ELPH-MCNC: 3.5 G/DL — SIGNIFICANT CHANGE UP (ref 3.3–5)
ALP SERPL-CCNC: 111 U/L — SIGNIFICANT CHANGE UP (ref 40–120)
ALT FLD-CCNC: 17 U/L — SIGNIFICANT CHANGE UP (ref 12–78)
ANION GAP SERPL CALC-SCNC: 3 MMOL/L — LOW (ref 5–17)
AST SERPL-CCNC: 16 U/L — SIGNIFICANT CHANGE UP (ref 15–37)
BASOPHILS # BLD AUTO: 0.05 K/UL — SIGNIFICANT CHANGE UP (ref 0–0.2)
BASOPHILS NFR BLD AUTO: 0.7 % — SIGNIFICANT CHANGE UP (ref 0–2)
BILIRUB SERPL-MCNC: 0.6 MG/DL — SIGNIFICANT CHANGE UP (ref 0.2–1.2)
BUN SERPL-MCNC: 26 MG/DL — HIGH (ref 7–23)
CALCIUM SERPL-MCNC: 9.1 MG/DL — SIGNIFICANT CHANGE UP (ref 8.5–10.1)
CHLORIDE SERPL-SCNC: 107 MMOL/L — SIGNIFICANT CHANGE UP (ref 96–108)
CO2 SERPL-SCNC: 32 MMOL/L — HIGH (ref 22–31)
CREAT SERPL-MCNC: 1.4 MG/DL — HIGH (ref 0.5–1.3)
CRP SERPL-MCNC: 1.97 MG/DL — HIGH (ref 0–0.4)
EOSINOPHIL # BLD AUTO: 0.32 K/UL — SIGNIFICANT CHANGE UP (ref 0–0.5)
EOSINOPHIL NFR BLD AUTO: 4.3 % — SIGNIFICANT CHANGE UP (ref 0–6)
ERYTHROCYTE [SEDIMENTATION RATE] IN BLOOD: 25 MM/HR — HIGH (ref 0–20)
ESTIMATED AVERAGE GLUCOSE: 189 MG/DL — HIGH (ref 68–114)
GLUCOSE SERPL-MCNC: 113 MG/DL — HIGH (ref 70–99)
HCT VFR BLD CALC: 42.2 % — SIGNIFICANT CHANGE UP (ref 39–50)
HGB BLD-MCNC: 13.8 G/DL — SIGNIFICANT CHANGE UP (ref 13–17)
IMM GRANULOCYTES NFR BLD AUTO: 0.4 % — SIGNIFICANT CHANGE UP (ref 0–1.5)
LYMPHOCYTES # BLD AUTO: 1.06 K/UL — SIGNIFICANT CHANGE UP (ref 1–3.3)
LYMPHOCYTES # BLD AUTO: 14.2 % — SIGNIFICANT CHANGE UP (ref 13–44)
MCHC RBC-ENTMCNC: 28.9 PG — SIGNIFICANT CHANGE UP (ref 27–34)
MCHC RBC-ENTMCNC: 32.7 GM/DL — SIGNIFICANT CHANGE UP (ref 32–36)
MCV RBC AUTO: 88.3 FL — SIGNIFICANT CHANGE UP (ref 80–100)
MONOCYTES # BLD AUTO: 0.51 K/UL — SIGNIFICANT CHANGE UP (ref 0–0.9)
MONOCYTES NFR BLD AUTO: 6.9 % — SIGNIFICANT CHANGE UP (ref 2–14)
NEUTROPHILS # BLD AUTO: 5.47 K/UL — SIGNIFICANT CHANGE UP (ref 1.8–7.4)
NEUTROPHILS NFR BLD AUTO: 73.5 % — SIGNIFICANT CHANGE UP (ref 43–77)
NRBC # BLD: 0 /100 WBCS — SIGNIFICANT CHANGE UP (ref 0–0)
PLATELET # BLD AUTO: 231 K/UL — SIGNIFICANT CHANGE UP (ref 150–400)
POTASSIUM SERPL-MCNC: 4.6 MMOL/L — SIGNIFICANT CHANGE UP (ref 3.5–5.3)
POTASSIUM SERPL-SCNC: 4.6 MMOL/L — SIGNIFICANT CHANGE UP (ref 3.5–5.3)
PREALB SERPL-MCNC: 25 MG/DL — SIGNIFICANT CHANGE UP (ref 20–40)
PROT SERPL-MCNC: 7.8 G/DL — SIGNIFICANT CHANGE UP (ref 6–8.3)
RBC # BLD: 4.78 M/UL — SIGNIFICANT CHANGE UP (ref 4.2–5.8)
RBC # FLD: 13.2 % — SIGNIFICANT CHANGE UP (ref 10.3–14.5)
SARS-COV-2 RNA SPEC QL NAA+PROBE: SIGNIFICANT CHANGE UP
SODIUM SERPL-SCNC: 142 MMOL/L — SIGNIFICANT CHANGE UP (ref 135–145)
WBC # BLD: 7.44 K/UL — SIGNIFICANT CHANGE UP (ref 3.8–10.5)
WBC # FLD AUTO: 7.44 K/UL — SIGNIFICANT CHANGE UP (ref 3.8–10.5)

## 2020-08-26 PROCEDURE — 93010 ELECTROCARDIOGRAM REPORT: CPT

## 2020-08-26 PROCEDURE — 71045 X-RAY EXAM CHEST 1 VIEW: CPT | Mod: 26

## 2020-08-26 PROCEDURE — 73630 X-RAY EXAM OF FOOT: CPT | Mod: 26,50

## 2020-08-26 PROCEDURE — 99232 SBSQ HOSP IP/OBS MODERATE 35: CPT | Mod: 57

## 2020-08-26 RX ORDER — HYDRALAZINE HCL 50 MG
25 TABLET ORAL EVERY 8 HOURS
Refills: 0 | Status: DISCONTINUED | OUTPATIENT
Start: 2020-08-26 | End: 2020-08-27

## 2020-08-26 RX ORDER — LOSARTAN POTASSIUM 100 MG/1
25 TABLET, FILM COATED ORAL DAILY
Refills: 0 | Status: DISCONTINUED | OUTPATIENT
Start: 2020-08-26 | End: 2020-08-27

## 2020-08-26 RX ORDER — VANCOMYCIN HCL 1 G
1000 VIAL (EA) INTRAVENOUS ONCE
Refills: 0 | Status: COMPLETED | OUTPATIENT
Start: 2020-08-26 | End: 2020-08-26

## 2020-08-26 RX ORDER — METOPROLOL TARTRATE 50 MG
75 TABLET ORAL
Refills: 0 | Status: DISCONTINUED | OUTPATIENT
Start: 2020-08-26 | End: 2020-08-26

## 2020-08-26 RX ORDER — GABAPENTIN 400 MG/1
300 CAPSULE ORAL
Refills: 0 | Status: DISCONTINUED | OUTPATIENT
Start: 2020-08-26 | End: 2020-08-27

## 2020-08-26 RX ORDER — DEXTROSE 50 % IN WATER 50 %
25 SYRINGE (ML) INTRAVENOUS ONCE
Refills: 0 | Status: DISCONTINUED | OUTPATIENT
Start: 2020-08-26 | End: 2020-08-27

## 2020-08-26 RX ORDER — PIPERACILLIN AND TAZOBACTAM 4; .5 G/20ML; G/20ML
3.38 INJECTION, POWDER, LYOPHILIZED, FOR SOLUTION INTRAVENOUS ONCE
Refills: 0 | Status: COMPLETED | OUTPATIENT
Start: 2020-08-26 | End: 2020-08-26

## 2020-08-26 RX ORDER — TACROLIMUS 5 MG/1
1.5 CAPSULE ORAL
Refills: 0 | Status: DISCONTINUED | OUTPATIENT
Start: 2020-08-26 | End: 2020-08-27

## 2020-08-26 RX ORDER — METOPROLOL TARTRATE 50 MG
75 TABLET ORAL
Refills: 0 | Status: DISCONTINUED | OUTPATIENT
Start: 2020-08-26 | End: 2020-08-27

## 2020-08-26 RX ORDER — INSULIN LISPRO 100/ML
1 VIAL (ML) SUBCUTANEOUS
Refills: 0 | Status: DISCONTINUED | OUTPATIENT
Start: 2020-08-26 | End: 2020-08-27

## 2020-08-26 RX ORDER — SODIUM CHLORIDE 9 MG/ML
1000 INJECTION, SOLUTION INTRAVENOUS
Refills: 0 | Status: DISCONTINUED | OUTPATIENT
Start: 2020-08-26 | End: 2020-08-27

## 2020-08-26 RX ORDER — ATORVASTATIN CALCIUM 80 MG/1
10 TABLET, FILM COATED ORAL AT BEDTIME
Refills: 0 | Status: DISCONTINUED | OUTPATIENT
Start: 2020-08-26 | End: 2020-08-27

## 2020-08-26 RX ORDER — DEXTROSE 50 % IN WATER 50 %
12.5 SYRINGE (ML) INTRAVENOUS ONCE
Refills: 0 | Status: DISCONTINUED | OUTPATIENT
Start: 2020-08-26 | End: 2020-08-27

## 2020-08-26 RX ORDER — GABAPENTIN 400 MG/1
1 CAPSULE ORAL
Qty: 0 | Refills: 0 | DISCHARGE

## 2020-08-26 RX ORDER — ASPIRIN/CALCIUM CARB/MAGNESIUM 324 MG
325 TABLET ORAL DAILY
Refills: 0 | Status: DISCONTINUED | OUTPATIENT
Start: 2020-08-26 | End: 2020-08-27

## 2020-08-26 RX ORDER — METOPROLOL TARTRATE 50 MG
1 TABLET ORAL
Qty: 0 | Refills: 0 | DISCHARGE

## 2020-08-26 RX ORDER — GLUCAGON INJECTION, SOLUTION 0.5 MG/.1ML
1 INJECTION, SOLUTION SUBCUTANEOUS ONCE
Refills: 0 | Status: DISCONTINUED | OUTPATIENT
Start: 2020-08-26 | End: 2020-08-27

## 2020-08-26 RX ORDER — AZATHIOPRINE 100 MG/1
100 TABLET ORAL DAILY
Refills: 0 | Status: DISCONTINUED | OUTPATIENT
Start: 2020-08-26 | End: 2020-08-27

## 2020-08-26 RX ORDER — DEXTROSE 50 % IN WATER 50 %
15 SYRINGE (ML) INTRAVENOUS ONCE
Refills: 0 | Status: DISCONTINUED | OUTPATIENT
Start: 2020-08-26 | End: 2020-08-27

## 2020-08-26 RX ORDER — CEFTRIAXONE 500 MG/1
2000 INJECTION, POWDER, FOR SOLUTION INTRAMUSCULAR; INTRAVENOUS EVERY 24 HOURS
Refills: 0 | Status: DISCONTINUED | OUTPATIENT
Start: 2020-08-26 | End: 2020-08-27

## 2020-08-26 RX ORDER — INSULIN LISPRO 100/ML
0 VIAL (ML) SUBCUTANEOUS
Qty: 0 | Refills: 0 | DISCHARGE

## 2020-08-26 RX ORDER — FAMOTIDINE 10 MG/ML
20 INJECTION INTRAVENOUS DAILY
Refills: 0 | Status: DISCONTINUED | OUTPATIENT
Start: 2020-08-26 | End: 2020-08-27

## 2020-08-26 RX ADMIN — CEFTRIAXONE 100 MILLIGRAM(S): 500 INJECTION, POWDER, FOR SOLUTION INTRAMUSCULAR; INTRAVENOUS at 21:40

## 2020-08-26 RX ADMIN — LOSARTAN POTASSIUM 25 MILLIGRAM(S): 100 TABLET, FILM COATED ORAL at 19:05

## 2020-08-26 RX ADMIN — Medication 250 MILLIGRAM(S): at 11:06

## 2020-08-26 RX ADMIN — PIPERACILLIN AND TAZOBACTAM 200 GRAM(S): 4; .5 INJECTION, POWDER, LYOPHILIZED, FOR SOLUTION INTRAVENOUS at 10:00

## 2020-08-26 RX ADMIN — Medication 25 MILLIGRAM(S): at 21:39

## 2020-08-26 RX ADMIN — Medication 0.1 MILLIGRAM(S): at 21:39

## 2020-08-26 RX ADMIN — FAMOTIDINE 20 MILLIGRAM(S): 10 INJECTION INTRAVENOUS at 18:31

## 2020-08-26 RX ADMIN — ATORVASTATIN CALCIUM 10 MILLIGRAM(S): 80 TABLET, FILM COATED ORAL at 21:39

## 2020-08-26 RX ADMIN — GABAPENTIN 300 MILLIGRAM(S): 400 CAPSULE ORAL at 18:30

## 2020-08-26 RX ADMIN — TACROLIMUS 1.5 MILLIGRAM(S): 5 CAPSULE ORAL at 21:39

## 2020-08-26 RX ADMIN — Medication 75 MILLIGRAM(S): at 18:30

## 2020-08-26 NOTE — CONSULT NOTE ADULT - ASSESSMENT
1.	CKD 3, h/o Kidney transplant 2013, Living related donor (Baseline 1.2)  2.	Diabetes  3.	Hypertension  4.	Diabetic foot ulcer    Mild increase in creatinine from baseline. Will monitor trend. Continue preadmission meds. Pt on prograf & azathioprine.   Will check prograf levels. Monitor blood sugar levels. Insulin coverage as needed. Dietary restriction. Monitor BP trend. Titrate BP meds as needed. Salt restriction. Avoid nephrotoxic meds as possible. Podiatry follow up.   Will follow electrolytes and renal function trend. Further recommendations pending clinical course. Thank you for the courtesy of this referral.

## 2020-08-26 NOTE — ED PROVIDER NOTE - ATTENDING CONTRIBUTION TO CARE
pt referred from wound center for admission for left 3rd toe amptation due to ulcer with infection spreading to bone. pt relates ulcer x 2 months. no fevers, chills cp, sob, abd pain, d/n/v. no known covid exposures.  wd, wn male, nad, s1s2 rrr, lungs cta, abd soft, nt, left foot s/p partial amputation 2nd toe, 3rd toe with stage 4 ulcer to tip, s/p 4th toe amputation

## 2020-08-26 NOTE — ED PROVIDER NOTE - NS ED ATTENDING STATEMENT MOD
on chart
I have personally performed a face to face diagnostic evaluation on this patient. I have reviewed the ACP note and agree with the history, exam and plan of care, except as noted.

## 2020-08-26 NOTE — H&P ADULT - PROBLEM SELECTOR PLAN 6
chronic, on insulin pump  -Pat Weil diabetes educator to see patient  -Endo Dr. Perlman consulted, f/u recs chronic, on lovastatin at home, continue  -DASH/TLC diet

## 2020-08-26 NOTE — H&P ADULT - ATTENDING COMMENTS
Pt seen, Examined, case & care plan d/w pt, resident at detail.  D/W ID Dr Saucedo , D/W Dr Catherine-Podiatry & Podiatry resident, I have discussed my concern about pt unable to have MRI foot to confirm Dx of OM , as per Podiatry, Left foot toe Bone is exposed, wound probes to Bone & X ray foot shows Bone erossion c/w OM , No Need for MRI Rt Foot 2nd digit is early changes.  Case D/W Diabetic RN Pat Weil & Dr C perlman   AM Labs   NPO after Mid night for Podiatry surgery in AM   'Pt is medically optimized for schedule procedure.  -Pre op IV Antibiotics as per Dr Saucedo/Podiatry.  I have d/w pt at very detail about B/L Foot X ray findings- pt has had d/w Podiatry  that with left foot toe bone being exposed X Ray left foot shows Bone erosion, possible OM ,  Rt Foot 2nd toe distal digit erythema & swelling present, Early changes at present on clinical Exam -that it may not  be OM at present, if there is not already osteo it can  develop in near future ,Pt understands role of surgery at this time, Pt Voiced full verbal understanding & wants to have surgery done even if it is NOT OM at this time.

## 2020-08-26 NOTE — CONSULT NOTE ADULT - PROBLEM SELECTOR RECOMMENDATION 9
pt aware that with bone being exposed if there is not already osteo it will develop. Pt understands role of surgery with nonhealing situation and this is reasonable and warranted in this context. Pending surgery and further micro data recommend:  ceftriaxone 2 grams IV Q-day pending micro data

## 2020-08-26 NOTE — ED PROVIDER NOTE - OBJECTIVE STATEMENT
55 y/o male with PMHx CVA with left side residual sensory deficit, Renal Transplant, T1DM sent by dr. Araiza due to diabetic foot ulcer x 2 months. pt reports ulcer to left 3rd toe and right 2nd toe due to possible excessive rubbing, in which blister formed then ulcer presented. pt reports he is currently on bactrim antibiotic. pt denies pain currently, fever, chills, trauma, vomiting, cp, sob, or any other complaints.

## 2020-08-26 NOTE — CONSULT NOTE ADULT - PROBLEM SELECTOR RECOMMENDATION 9
Diabetic ulcer of right toe   Patient examined and evaluated at this time.  Discussed etiology of symptoms and all questions answered to satisfaction with patient.  Given the patient's severity of symptoms, will plan for surgical intervention at this time. Discussed risks, benefits, and potential complications with patient and family members regarding surgical intervention including sepsis, loss of limb, and loss of life. All questions answered to satisfaction at this time.    Requesting cardiology risk stratification and optimization.  Requesting medical risk stratification and optimization.    Scheduled for left 3rd digit amputation and right partial 2nd digit amputation on 8/27/2020. Diabetic ulcer of right toe   Patient examined and evaluated at this time.  Discussed etiology of symptoms and all questions answered to satisfaction with patient.  Will f/u bilateral foot x -rays   Given the patient's severity of symptoms, will plan for surgical intervention at this time. Discussed risks, benefits, and potential complications with patient and family members regarding surgical intervention including sepsis, loss of limb, and loss of life. All questions answered to satisfaction at this time.    Requesting cardiology risk stratification and optimization.  Requesting medical risk stratification and optimization.    Scheduled for left 3rd digit amputation and right partial 2nd digit amputation on 8/27/2020. Diabetic ulcer of right toe   Patient examined and evaluated at this time.  Discussed etiology of symptoms and all questions answered to satisfaction with patient.  XRay - Left 3rd digit - distal phalanx shows cortical erosions  Given the patient's severity of symptoms, will plan for surgical intervention at this time. Discussed risks, benefits, and potential complications with patient and family members regarding surgical intervention including sepsis, loss of limb, and loss of life. All questions answered to satisfaction at this time.    Requesting cardiology risk stratification and optimization.  Requesting medical risk stratification and optimization.    Scheduled for left 3rd digit amputation and right partial 2nd digit amputation on 8/27/2020.

## 2020-08-26 NOTE — CONSULT NOTE ADULT - ASSESSMENT
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

## 2020-08-26 NOTE — H&P ADULT - NSICDXPASTSURGICALHX_GEN_ALL_CORE_FT
PAST SURGICAL HISTORY:  Ear disease Left inner ear surgery    End-stage renal failure with renal transplant 12/2013    H/O foot surgery 2005 - right foot - bone fracture - s/p ORIF and subsequent removal of hardware    S/P kidney transplant     Toe infection 2007 L 4th Toe surgery-amputation secondary to osteomyelitis    Vasectomy status s/p b/l  vasectomy

## 2020-08-26 NOTE — CONSULT NOTE ADULT - SUBJECTIVE AND OBJECTIVE BOX
HPI:  Patient is a 54y year old Male seen at [facility] ED for bilateral diabetic foot ulcers with osteomyelitis to the left 3rd digit and possible osteomyelitis to the right 2nd digit. Patient was sitting in bed and was in NAD. Patient states he had noticed the wounds to the distal left 3rd digit about 2 months ago.  Patient states he was seeing an outside podiatrist for the wounds but the wounds did not heal and was sent to [place] wound care clinic for further treatment by dr. Araiza. Patient denies any pain to the distal digital wounds. Patient states he has history of renal transplant. Patient also has history of CVA to the left side in 2015 and partial digital amputation to the left 2nd digit in 2019 with Dr. Araiza. Patient denies any fever, chills, nausea, vomiting, chest pain, shortness of breath, or calf pain at this time.    REVIEW OF SYSTEMS    PAST MEDICAL & SURGICAL HISTORY:  CVA (cerebral vascular accident)  Obesity (BMI 30-39.9)  Diabetic peripheral neuropathy  CKD (chronic kidney disease)  Diabetes mellitus  Hyperlipidemia  Hypertension  S/P kidney transplant  End-stage renal failure with renal transplant: 12/2013  H/O foot surgery: 2005 - right foot - bone fracture - s/p ORIF and subsequent removal of hardware  Vasectomy status: s/p b/l  vasectomy  Ear disease: Left inner ear surgery  Toe infection: 2007 L 4th Toe surgery-amputation secondary to osteomyelitis      Allergies    No Known Allergies    Intolerances        MEDICATIONS  (STANDING):  piperacillin/tazobactam IVPB... 3.375 Gram(s) IV Intermittent once  vancomycin  IVPB. 1000 milliGRAM(s) IV Intermittent once    MEDICATIONS  (PRN):      Social History:      FAMILY HISTORY:  No pertinent family history in first degree relatives      Vital Signs Last 24 Hrs  T(C): 37.1 (26 Aug 2020 09:23), Max: 37.1 (26 Aug 2020 09:23)  T(F): 98.8 (26 Aug 2020 09:23), Max: 98.8 (26 Aug 2020 09:23)  HR: 64 (26 Aug 2020 09:23) (64 - 64)  BP: 129/71 (26 Aug 2020 09:23) (129/71 - 129/71)  BP(mean): --  RR: 18 (26 Aug 2020 09:23) (18 - 18)  SpO2: 94% (26 Aug 2020 09:23) (94% - 94%)    PHYSICAL EXAM:  Vascular: DP & PT palpable bilaterally, Capillary refill 3 seconds, Digital hair absent bilaterally  Neurological: Light touch sensation intact bilaterally  Musculoskeletal: 5/5 strength in all quadrants bilaterally, AJ & STJ ROM intact. Absence of left 4th digit and partial left 2nd digit secondary to amputation for bone infection   Dermatological:   Left distal 3rd digit wound  0.7cm x 0.5cm x 0.3cm, (+) probe to bone(clinical osteomyelitis), erythema with edema noted to the left 3rd digit, no fluctuance, no malodor, no proximal streaking at this time  Right distal 2nd digit wound 0.2cm x 0.2cm x 0.1cm, mild erythema and edema noted to right 2nd digit, no fluctuance, no malodor, no proximal streaking at this time    CBC Full  -  ( 26 Aug 2020 10:34 )  WBC Count : 7.44 K/uL  RBC Count : 4.78 M/uL  Hemoglobin : 13.8 g/dL  Hematocrit : 42.2 %  Platelet Count - Automated : 231 K/uL  Mean Cell Volume : 88.3 fl  Mean Cell Hemoglobin : 28.9 pg  Mean Cell Hemoglobin Concentration : 32.7 gm/dL  Auto Neutrophil # : 5.47 K/uL  Auto Lymphocyte # : 1.06 K/uL  Auto Monocyte # : 0.51 K/uL  Auto Eosinophil # : 0.32 K/uL  Auto Basophil # : 0.05 K/uL  Auto Neutrophil % : 73.5 %  Auto Lymphocyte % : 14.2 %  Auto Monocyte % : 6.9 %  Auto Eosinophil % : 4.3 %  Auto Basophil % : 0.7 %      08-26    142  |  107  |  26<H>  ----------------------------<  113<H>  4.6   |  32<H>  |  1.40<H>    Ca    9.1      26 Aug 2020 10:34    TPro  7.8  /  Alb  3.5  /  TBili  0.6  /  DBili  x   /  AST  16  /  ALT  17  /  AlkPhos  111  08-26        Imaging: ---------- HPI:  Patient is a 54y year old Male seen at [facility] ED for bilateral diabetic foot ulcers with osteomyelitis to the left 3rd digit and possible osteomyelitis to the right 2nd digit. Patient was sitting in bed and was in NAD. Patient states he had noticed the wounds to the distal left 3rd digit about 2 months ago.  Patient states he was seeing an outside podiatrist for the wounds but the wounds did not heal and was sent to [place] wound care clinic for further treatment by dr. Araiza. Patient denies any pain to the distal digital wounds. Patient states he has history of renal transplant. Patient also has history of CVA to the left side in 2015 and partial digital amputation to the left 2nd digit in 2019 with Dr. Araiza. Patient denies any fever, chills, nausea, vomiting, chest pain, shortness of breath, or calf pain at this time.    REVIEW OF SYSTEMS    PAST MEDICAL & SURGICAL HISTORY:  CVA (cerebral vascular accident)  Obesity (BMI 30-39.9)  Diabetic peripheral neuropathy  CKD (chronic kidney disease)  Diabetes mellitus  Hyperlipidemia  Hypertension  S/P kidney transplant  End-stage renal failure with renal transplant: 12/2013  H/O foot surgery: 2005 - right foot - bone fracture - s/p ORIF and subsequent removal of hardware  Vasectomy status: s/p b/l  vasectomy  Ear disease: Left inner ear surgery  Toe infection: 2007 L 4th Toe surgery-amputation secondary to osteomyelitis      Allergies    No Known Allergies    Intolerances        MEDICATIONS  (STANDING):  piperacillin/tazobactam IVPB... 3.375 Gram(s) IV Intermittent once  vancomycin  IVPB. 1000 milliGRAM(s) IV Intermittent once    MEDICATIONS  (PRN):      Social History:      FAMILY HISTORY:  No pertinent family history in first degree relatives      Vital Signs Last 24 Hrs  T(C): 37.1 (26 Aug 2020 09:23), Max: 37.1 (26 Aug 2020 09:23)  T(F): 98.8 (26 Aug 2020 09:23), Max: 98.8 (26 Aug 2020 09:23)  HR: 64 (26 Aug 2020 09:23) (64 - 64)  BP: 129/71 (26 Aug 2020 09:23) (129/71 - 129/71)  BP(mean): --  RR: 18 (26 Aug 2020 09:23) (18 - 18)  SpO2: 94% (26 Aug 2020 09:23) (94% - 94%)    PHYSICAL EXAM:  Vascular: DP & PT palpable bilaterally, Capillary refill 3 seconds, Digital hair absent bilaterally  Neurological: Light touch sensation diminished to the level of the lower thirs of the leg bilaterally   Musculoskeletal: 5/5 strength in all quadrants bilaterally, AJ & STJ ROM intact. Absence of left 4th digit and partial left 2nd digit secondary to amputation for bone infection   Dermatological:   Left distal 3rd digit wound  0.7cm x 0.5cm x 0.3cm, (+) probe to bone(clinical osteomyelitis), erythema with edema noted to the left 3rd digit, no fluctuance, no malodor, no proximal streaking at this time  Right distal 2nd digit wound 0.2cm x 0.2cm x 0.1cm, mild erythema and edema noted to right 2nd digit, no fluctuance, no malodor, no proximal streaking at this time    CBC Full  -  ( 26 Aug 2020 10:34 )  WBC Count : 7.44 K/uL  RBC Count : 4.78 M/uL  Hemoglobin : 13.8 g/dL  Hematocrit : 42.2 %  Platelet Count - Automated : 231 K/uL  Mean Cell Volume : 88.3 fl  Mean Cell Hemoglobin : 28.9 pg  Mean Cell Hemoglobin Concentration : 32.7 gm/dL  Auto Neutrophil # : 5.47 K/uL  Auto Lymphocyte # : 1.06 K/uL  Auto Monocyte # : 0.51 K/uL  Auto Eosinophil # : 0.32 K/uL  Auto Basophil # : 0.05 K/uL  Auto Neutrophil % : 73.5 %  Auto Lymphocyte % : 14.2 %  Auto Monocyte % : 6.9 %  Auto Eosinophil % : 4.3 %  Auto Basophil % : 0.7 %      08-26    142  |  107  |  26<H>  ----------------------------<  113<H>  4.6   |  32<H>  |  1.40<H>    Ca    9.1      26 Aug 2020 10:34    TPro  7.8  /  Alb  3.5  /  TBili  0.6  /  DBili  x   /  AST  16  /  ALT  17  /  AlkPhos  111  08-26        Imaging: ----------

## 2020-08-26 NOTE — CONSULT NOTE ADULT - PROBLEM SELECTOR PROBLEM 1
Diabetic ulcer of toe of left foot
Type 1 diabetes mellitus with stage 5 chronic kidney disease not on chronic dialysis
Osteomyelitis

## 2020-08-26 NOTE — H&P ADULT - PROBLEM SELECTOR PLAN 1
sent in by Dr. Araiza wound care to r/o osteomyelitis, ESR mildly elevated to 25  -s/p vanco and zosyn in the ED,   -MRI left foot pending, f/u results to confirm osteomyelitis  -f/u blood and urine cx  -Podiatry Dr. Araiza consulted, recs appreciated  -ID Dr. Saucedo consulted, f/u recs  -cardio Dr. Jansen's group consulted for cardiac clearance for possible procedure sent in by Dr. Araiza wound care to r/o osteomyelitis, ESR mildly elevated to 25  -s/p vanco and zosyn in the ED,   -MRI left foot pending to confirm osteo, pt states he is unable to get MRI due to metals 2/2 inner ear surgery   -f/u blood and urine cx  -Podiatry Dr. Araiza consulted, recs appreciated  -ID Dr. Saucedo consulted, f/u recs  -cardio Dr. Jansen's group consulted for cardiac clearance for possible procedure  -scheduled for left 3rd digit amputation and right partial 2nd digit amputation on 8/27/20 sent in by Dr. Araiza wound care to r/o osteomyelitis, ESR mildly elevated to 25  -s/p vanco and zosyn in the ED,   -pt states he is unable to get MRI due to metals 2/2 inner ear surgery   -f/u blood and urine cx  -Podiatry Dr. Araiza consulted, recs appreciated  -ID Dr. Saucedo consulted, f/u recs- as per ID pt aware that with bone being exposed if there is not already osteo it will develop. Pt understands role of surgery with nonhealing situation and this is reasonable and warranted in this context.  -Rocephin 2 gm IV Daily   -cardio Dr. Jansen's group consulted for cardiac clearance for possible procedure  -scheduled for left 3rd digit amputation and right partial 2nd digit amputation on 8/27/20 sent in by Dr. Araiza wound care for Possible  osteomyelitis left foot 3rd toe distal digit , ESR mildly elevated to 25, X Ray foot left side-Bone erosions on 3rd toe   -s/p vanco and zosyn in the ED,   -pt states he is unable to get MRI due to metals 2/2 inner ear surgery   -f/u blood C/S x 2 and urine cx  -Podiatry Dr. Araiza consulted, recs appreciated  -ID Dr. Saucedo consulted, f/u recs- as per ID pt aware that with bone being exposed if there is not already osteo it will develop. Pt understands role of surgery with nonhealing situation and this is reasonable and warranted in this context.  -Rocephin 2 gm IV Daily   -cardio Dr. Jansen's group consulted for cardiac clearance for possible procedure  -scheduled for left 3rd digit amputation and right partial 2nd digit amputation on 8/27/20

## 2020-08-26 NOTE — CONSULT NOTE ADULT - SUBJECTIVE AND OBJECTIVE BOX
Patient is a 54y old  Male who presents with a chief complaint of Bilateral diabetic foot ulcers (26 Aug 2020 10:58)    HPI:  53 y/o male with PMHx CVA with left side residual sensory deficit, Renal Transplant, T1DM sent by dr. Araiza due to diabetic foot ulcer x 2 months. pt reports ulcer to left 3rd toe and right 2nd toe due to possible excessive rubbing, in which blister formed then ulcer presented. pt reports he is currently on bactrim antibiotic. pt denies pain currently, fever, chills, trauma, vomiting, cp, sob, or any other complaints.    Renal consult called for chronic kidney disease stage 3. Pt with h/o Kidney transplant in 2013 @ [facility] Ramachandran (pt's sister in law)    PAST MEDICAL HISTORY:  CVA (cerebral vascular accident)  Obesity (BMI 30-39.9)  Diabetic peripheral neuropathy  CKD (chronic kidney disease)  Diabetes mellitus  Hyperlipidemia  Hypertension      PAST SURGICAL HISTORY:  S/P kidney transplant  End-stage renal failure with renal transplant  H/O foot surgery  Vasectomy status  Ear disease  Toe infection      FAMILY HISTORY:  No pertinent family history in first degree relatives      SOCIAL HISTORY: No smoking or alcohol use     Allergies    No Known Allergies    Intolerances      Home Medications:  aspirin 325 mg oral tablet: 1 tab(s) orally once a day (26 Aug 2020 12:06)  Cozaar 25 mg oral tablet: 1 tab(s) orally once a day (26 Aug 2020 12:06)  famotidine 20 mg oral tablet: 1 tab(s) orally once a day (26 Aug 2020 12:06)  gabapentin 300 mg oral capsule: 1 cap(s) orally 2 times a day (26 Aug 2020 12:06)  lovastatin 40 mg oral tablet: 1 tab(s) orally once a day (26 Aug 2020 12:06)  Metoprolol Tartrate 25 mg oral tablet: 3 tab(s) orally 2 times a day    *conf. with patient and pharmacy. 75mg dose, BID* (26 Aug 2020 12:06)  tacrolimus 0.5 mg oral capsule: 3 cap(s) orally 2 times a day (26 Aug 2020 12:06)  Azathioprine  Bactrim    MEDICATIONS  (STANDING):    MEDICATIONS  (PRN):      REVIEW OF SYSTEMS:  General: no distress  Respiratory: No cough, SOB  Cardiovascular: No CP or Palpitations	  Gastrointestinal: No nausea, Vomiting. No diarrhea  Genitourinary: No urinary complaints	  Musculoskeletal: No new rash or lesions		  all other systems negative    T(F): 98.8 (08-26-20 @ 09:23), Max: 98.8 (08-26-20 @ 09:23)  HR: 64 (08-26-20 @ 09:23) (64 - 64)  BP: 129/71 (08-26-20 @ 09:23) (129/71 - 129/71)  RR: 18 (08-26-20 @ 09:23) (18 - 18)  SpO2: 94% (08-26-20 @ 09:23) (94% - 94%)  Wt(kg): --    PHYSICAL EXAM:  General: NAD  Respiratory: b/l air entry  Cardiovascular: S1 S2  Gastrointestinal: soft  Extremities: edema, LEft foot toe ulcer         08-26    142  |  107  |  26<H>  ----------------------------<  113<H>  4.6   |  32<H>  |  1.40<H>    Ca    9.1      26 Aug 2020 10:34    TPro  7.8  /  Alb  3.5  /  TBili  0.6  /  DBili  x   /  AST  16  /  ALT  17  /  AlkPhos  111  08-26                          13.8   7.44  )-----------( 231      ( 26 Aug 2020 10:34 )             42.2       Hemoglobin: 13.8 g/dL (08-26 @ 10:34)  Hematocrit: 42.2 % (08-26 @ 10:34)  Potassium, Serum: 4.6 mmol/L (08-26 @ 10:34)  Blood Urea Nitrogen, Serum: 26 mg/dL (08-26 @ 10:34)      Creatinine, Serum: 1.40 (08-26 @ 10:34)        LIVER FUNCTIONS - ( 26 Aug 2020 10:34 )  Alb: 3.5 g/dL / Pro: 7.8 g/dL / ALK PHOS: 111 U/L / ALT: 17 U/L / AST: 16 U/L / GGT: x           < from: Xray Chest 1 View AP/PA (08.26.20 @ 10:03) >    EXAM:  XR CHEST AP OR PA 1V                            PROCEDURE DATE:  08/26/2020          INTERPRETATION:  PROCEDURE: AP view of the chest.    CLINICAL INFORMATION: Admission.    COMPARISON: 04/30/2017.    FINDINGS:    Lungs: The lungs are clear.Mild elevation of the right hemidiaphragm.  Heart: The heart is normal in size. Loop recorder overlies the heart.  Mediastinum: The mediastinum is within normal limits.    IMPRESSION:  Mild elevation of the right hemidiaphragm. No focal consolidation.        TAISHA HOTL M.D., ATTENDING RADIOLOGIST  This document has been electronically signed. Aug 26 2020 10:27AM                < end of copied text >

## 2020-08-26 NOTE — H&P ADULT - PROBLEM SELECTOR PLAN 7
chronic, on lovastatin at home, continue  -DASH/TLC diet chronic, initial BP wnl  -continue home clonidine, and metoprolol and losartan

## 2020-08-26 NOTE — H&P ADULT - PROBLEM SELECTOR PLAN 2
Maksim CVA (2015) - residual deficits of loss of pain and temperature sensation in left arm and right face  -continue home aspirin Maksim CVA (2015) - residual deficits of loss of pain and temperature sensation in left arm and right face  -continue home aspirin 325 mg   -continue home gabapentin for residual neuropathic facial pain

## 2020-08-26 NOTE — ED PROVIDER NOTE - CLINICAL SUMMARY MEDICAL DECISION MAKING FREE TEXT BOX
sent by dr. Araiza due to diabetic foot ulcer x 2 months. pt reports ulcer to left 3rd toe and right 2nd toe due to possible excessive rubbing, in which blister formed then ulcer presented. pt reports he is currently on bactrim antibiotic. plan includes labs, IVF, IV abx, Xray b/l feet, COVID/EKG/CXR for admission

## 2020-08-26 NOTE — H&P ADULT - NSHPOUTPATIENTPROVIDERS_GEN_ALL_CORE
PCP - Dr. Kobe Miranda  Kidney Transplant - Dr. Navarro, Dr. Lindsay [facility]  Podiatrist - Dr. Pedersen PCP - Dr. Kobe Miranda  Kidney Transplant - Dr. Navarro, Dr. Lindsay [facility]  Nephrologist - Dr. Weber  Podiatrist - Dr. Pedersen  Neurologist - Dr. Claire  Endocrinologist - Dr. Garcia

## 2020-08-26 NOTE — ADVANCED PRACTICE NURSE CONSULT - RECOMMEDATIONS
Type 1 A1c pending % adm DFU- OR tomorrow  insulin pump-mini med medtronic  CGM-no  Recommend endocrine, RD  consent form and attestation form- to be complete- in progress  FU appt:TBA  Goal 100-180 mg/dL Type 1 A1c pending % adm DFU- OR tomorrow  insulin pump-mini med medtronic  CGM-no  Recommend endocrine, RD  consent form and attestation form- to be complete- in progress  FU appt: Dr Jasbir Garcia last seen day of admission- patient has FU appt info at home  Goal 100-180 mg/dL Type 1 A1c pending % adm DFU- OR tomorrow  insulin pump-mini med medtronic  CGM-no  Recommend endocrine, RD  consent form and attestation form- to be complete- in progress  FU appt: Dr Jasbir Garcia last seen day of admission- patient has FU appt scheduled/ info at home  Goal 100-180 mg/dL

## 2020-08-26 NOTE — H&P ADULT - SKIN
detailed exam ulcers noted on L 3rd digit, R 2nd digit, R dorsum of foot, R lower leg/erythema/scaling ulcers noted on L  foot 3rd digit, R  foot 2nd digit, R dorsum of foot, R lower leg/scaling/erythema

## 2020-08-26 NOTE — H&P ADULT - PROBLEM SELECTOR PLAN 3
continue home gabapentin BUN/Cr 26/1.40, mild increase from baseline, baseline appears to be Cr 1.1  -gentle IVF  -trend renal indices, avoid nephrotoxic meds  -if Cr worsens, consider stopping Losartan  -nephro Dr. Bryant consulted, recs appreciated - H/O Renal Transplant at [facility]   -BUN/Cr 26/1.40, mild increase from baseline, baseline appears to be Cr 1.1  -gentle IVF  -BMP in AM , avoid nephrotoxic meds  -if Cr worsens, consider stopping Losartan  -nephro Dr. Bryant consulted, recs appreciated - H/O Renal Transplant at [facility] , CKD 2-3   -BUN/Cr 26/1.40, mild increase from baseline, baseline appears to be Cr 1.1  -gentle IVF  -BMP in AM , avoid nephrotoxic meds  -if Cr worsens, consider stopping Losartan  -nephro Dr. Bryant consulted, recs appreciated

## 2020-08-26 NOTE — H&P ADULT - NEUROLOGICAL DETAILS
normal strength/sensation decreased bilateral LE, L>R/alert and oriented x 3 normal strength/strength decreased/alert and oriented x 3/sensation intact/sensation decreased bilateral LE, L>R

## 2020-08-26 NOTE — ED ADULT NURSE NOTE - PSH
Ear disease  Left inner ear surgery  End-stage renal failure with renal transplant  12/2013  H/O foot surgery  2005 - right foot - bone fracture - s/p ORIF and subsequent removal of hardware  S/P kidney transplant    Toe infection  2007 L 4th Toe surgery-amputation secondary to osteomyelitis  Vasectomy status  s/p b/l  vasectomy

## 2020-08-26 NOTE — H&P ADULT - PROBLEM SELECTOR PLAN 4
BUN/Cr 26/1.40, mild increase from baseline, baseline appears to be Cr 1.1  -gentle IVF  -trend renal indices, avoid nephrotoxic meds  -if Cr worsens, consider stopping Losartan  -nephro Dr. Bryant consulted, recs appreciated s/p right renal transplant for CKD (2013)  -continue home azathioprine and tacrolimus s/p right renal transplant for CKD (2013)  -continue home azathioprine and tacrolimus,  -Renal Dr Bryant , BMP in AM

## 2020-08-26 NOTE — CONSULT NOTE ADULT - SUBJECTIVE AND OBJECTIVE BOX
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 Noticed the wounds to the distal L 3rd digit about 2 months ago. Patient has been applying collagenase but showed no improvement. Has been seeing a podiatrist outpatient but wounds are not healing so was sent to wound care for additional management. Sent in by Dr. Araiza today to r/o osteomyelitis. Patient also has a burn rash on his chest that he acquired while cooking with oil 3 weeks ago. Scheduled for MRI of left ankle but patient states he is unable to have MRI due to having metal coils in his left inner ear. Denies fevers, chills, SOB, chest pain, pain in distal digital wounds.    In the ED, VS T98.8F HR 64 /71 RR 18 SpO2 94% on RA. Labs significant for mildly elevated ESR 25, normal WBC 7.44, BUN/Cr 26/1.40,   Received Vanco and Zosyn x1 in ED.  CXR showing mildly elevated R hemidiaphragm. No focal consolidation.  EKG showing 1st degree AV block (26 Aug 2020 12:52)      PAST MEDICAL & SURGICAL HISTORY:  History of DVT (deep vein thrombosis)  Squamous cell carcinoma of skin of left ear  CVA (cerebral vascular accident)  Obesity (BMI 30-39.9)  Diabetic peripheral neuropathy  CKD (chronic kidney disease)  Diabetes mellitus  Hyperlipidemia  Hypertension  S/P kidney transplant  End-stage renal failure with renal transplant: 12/2013  H/O foot surgery: 2005 - right foot - bone fracture - s/p ORIF and subsequent removal of hardware  Vasectomy status: s/p b/l  vasectomy  Ear disease: Left inner ear surgery  Toe infection: 2007 L 4th Toe surgery-amputation secondary to osteomyelitis      FAMILY HISTORY:  FH: skin cancer  Family history of diabetes mellitus (DM)        Social History:    MEDICATIONS  (STANDING):  aspirin 325 milliGRAM(s) Oral daily  atorvastatin 10 milliGRAM(s) Oral at bedtime  azaTHIOprine 100 milliGRAM(s) Oral daily  cefTRIAXone   IVPB 2000 milliGRAM(s) IV Intermittent every 24 hours  cloNIDine 0.1 milliGRAM(s) Oral every 12 hours  dextrose 5%. 1000 milliLiter(s) (50 mL/Hr) IV Continuous <Continuous>  dextrose 50% Injectable 12.5 Gram(s) IV Push once  dextrose 50% Injectable 25 Gram(s) IV Push once  dextrose 50% Injectable 25 Gram(s) IV Push once  famotidine    Tablet 20 milliGRAM(s) Oral daily  gabapentin 300 milliGRAM(s) Oral two times a day  hydrALAZINE 25 milliGRAM(s) Oral every 8 hours  insulin lispro (HumaLOG) Pump 1 Each SubCutaneous Continuous Pump  losartan 25 milliGRAM(s) Oral daily  metoprolol tartrate 75 milliGRAM(s) Oral two times a day  tacrolimus 1.5 milliGRAM(s) Oral two times a day  trimethoprim   80 mG/sulfamethoxazole 400 mG 1 Tablet(s) Oral daily    MEDICATIONS  (PRN):  dextrose 40% Gel 15 Gram(s) Oral once PRN Blood Glucose LESS THAN 70 milliGRAM(s)/deciliter  glucagon  Injectable 1 milliGRAM(s) IntraMuscular once PRN Glucose LESS THAN 70 milligrams/deciliter        T(C): 37 (08-26-20 @ 18:45), Max: 37.1 (08-26-20 @ 09:23)  HR: 66 (08-26-20 @ 18:45) (64 - 72)  BP: 172/80 (08-26-20 @ 18:45) (126/77 - 172/80)  RR: 16 (08-26-20 @ 18:45) (16 - 18)  SpO2: 95% (08-26-20 @ 18:45) (94% - 97%)  Wt(kg): --    PHYSICAL EXAM:  GENERAL: NAD, well-groomed, well-developed  HEAD:  Atraumatic, Normocephalic  NECK: Supple, No JVD, Normal thyroid  CHEST/LUNG: Clear to percussion bilaterally; No rales, rhonchi, wheezing, or rubs  HEART: Regular rate and rhythm; No murmurs, rubs, or gallops  ABDOMEN: Soft, Nontender, Nondistended; Bowel sounds present  EXTREMITIES:  left foot dsg intact    CAPILLARY BLOOD GLUCOSE      POCT Blood Glucose.: 133 mg/dL (26 Aug 2020 14:34)                            13.8   7.44  )-----------( 231      ( 26 Aug 2020 10:34 )             42.2       CMP:  08-26 @ 10:34  SGPT 17  Albumin 3.5   Alk Phos 111   Anion Gap 3   SGOT 16   Total Bili 0.6   BUN 26   Calcium Total 9.1   CO2 32   Chloride 107   Creatinine 1.40   eGFR if AA 66   eGFR if non AA 57   Glucose 113   Potassium 4.6   Protein 7.8   Sodium 142      Thyroid Function Tests:      Diabetes Tests:       Radiology:

## 2020-08-26 NOTE — H&P ADULT - NEGATIVE ENMT SYMPTOMS
no dysphagia/no ear pain/no throat pain/no nasal congestion/no nose bleeds/no dry mouth/no hearing difficulty

## 2020-08-26 NOTE — H&P ADULT - ASSESSMENT
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 Admit for r/o osteomyelitis.

## 2020-08-26 NOTE — CONSULT NOTE ADULT - SUBJECTIVE AND OBJECTIVE BOX
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 Noticed the wounds to the distal L 3rd digit about 2 months ago. Patient has been applying collagenase but showed no improvement. Has been seeing a podiatrist outpatient but wounds are not healing so was sent to wound care for additional management. Sent in by Dr. Araiza today to r/o osteomyelitis. Patient also has a burn rash on his chest that he acquired while cooking with oil 3 weeks ago. Scheduled for MRI of left ankle but patient states he is unable to have MRI due to having metal coils in his left inner ear. Denies fevers, chills, SOB, chest pain, pain in distal digital wounds.    In the ED, VS T98.8F HR 64 /71 RR 18 SpO2 94% on RA. Labs significant for mildly elevated ESR 25, normal WBC 7.44, BUN/Cr 26/1.40,   Received Vanco and Zosyn x1 in ED.  CXR showing mildly elevated R hemidiaphragm. No focal consolidation.  EKG showing 1st degree AV block (26 Aug 2020 12:52)      PAST MEDICAL & SURGICAL HISTORY:  History of DVT (deep vein thrombosis)  Squamous cell carcinoma of skin of left ear  CVA (cerebral vascular accident)  Obesity (BMI 30-39.9)  Diabetic peripheral neuropathy  CKD (chronic kidney disease)  Diabetes mellitus  Hyperlipidemia  Hypertension  S/P kidney transplant  End-stage renal failure with renal transplant: 12/2013  H/O foot surgery: 2005 - right foot - bone fracture - s/p ORIF and subsequent removal of hardware  Vasectomy status: s/p b/l  vasectomy  Ear disease: Left inner ear surgery  Toe infection: 2007 L 4th Toe surgery-amputation secondary to osteomyelitis      Antimicrobials  trimethoprim   80 mG/sulfamethoxazole 400 mG 1 Tablet(s) Oral daily      Immunological  azaTHIOprine 100 milliGRAM(s) Oral daily  tacrolimus 1.5 milliGRAM(s) Oral two times a day      Other  aspirin 325 milliGRAM(s) Oral daily  atorvastatin 10 milliGRAM(s) Oral at bedtime  cloNIDine 0.1 milliGRAM(s) Oral every 12 hours  dextrose 40% Gel 15 Gram(s) Oral once PRN  dextrose 5%. 1000 milliLiter(s) IV Continuous <Continuous>  dextrose 50% Injectable 12.5 Gram(s) IV Push once  dextrose 50% Injectable 25 Gram(s) IV Push once  dextrose 50% Injectable 25 Gram(s) IV Push once  famotidine    Tablet 20 milliGRAM(s) Oral daily  gabapentin 300 milliGRAM(s) Oral two times a day  glucagon  Injectable 1 milliGRAM(s) IntraMuscular once PRN  hydrALAZINE 25 milliGRAM(s) Oral every 8 hours  insulin lispro (HumaLOG) Pump 1 Each SubCutaneous Continuous Pump  losartan 25 milliGRAM(s) Oral daily  metoprolol tartrate 75 milliGRAM(s) Oral two times a day      Allergies    No Known Allergies    Intolerances        SOCIAL HISTORY:  Never a smoker, drinks ETOH once every few weeks, denies any illicit drugs  independent in ADLs, walks without assistance, lives at home with wife (26 Aug 2020 12:52)      FAMILY HISTORY:  FH: skin cancer  Family history of diabetes mellitus (DM)      ROS:    EYES:  Negative  blurry vision or double vision  GASTROINTESTINAL:  Negative for nausea, vomiting, diarrhea  -otherwise negative except for subjective    Vital Signs Last 24 Hrs  T(C): 37.1 (26 Aug 2020 09:23), Max: 37.1 (26 Aug 2020 09:23)  T(F): 98.8 (26 Aug 2020 09:23), Max: 98.8 (26 Aug 2020 09:23)  HR: 64 (26 Aug 2020 09:23) (64 - 64)  BP: 129/71 (26 Aug 2020 09:23) (129/71 - 129/71)  BP(mean): --  RR: 18 (26 Aug 2020 09:23) (18 - 18)  SpO2: 94% (26 Aug 2020 09:23) (94% - 94%)    PE:  WDWN in no distress  HEENT:  NC, PERRL, sclerae anicteric, conjunctivae clear, EOMI.  Sinuses nontender, no nasal exudate.  No buccal or pharyngeal lesions, erythema or exudate  Neck:  Supple, no adenopathy  Lungs:  No accessory muscle use, bilaterally clear to auscultation  Cor:  RRR, S1, S2, no murmur appreciated  Abd:  Symmetric, normoactive BS.  Soft, nontender, no masses, guarding or rebound.  Liver and spleen not enlarged  Extrem:  right foot-sig callouses, ulcer on plantar surface of tip of second digit, left foot partial amp of second digit and ulcer probing down to bone third digit, tissue appears black and possibly necrotic  Neuro: decreased touch  Musc: moving all limbs freely, no focal deficits    LABS:                        13.8   7.44  )-----------( 231      ( 26 Aug 2020 10:34 )             42.2       WBC Count: 7.44 K/uL (08-26-20 @ 10:34)      08-26    142  |  107  |  26<H>  ----------------------------<  113<H>  4.6   |  32<H>  |  1.40<H>    Ca    9.1      26 Aug 2020 10:34    TPro  7.8  /  Alb  3.5  /  TBili  0.6  /  DBili  x   /  AST  16  /  ALT  17  /  AlkPhos  111  08-26      Creatinine, Serum: 1.40 mg/dL (08-26-20 @ 10:34)      MICROBIOLOGY:      RADIOLOGY & ADDITIONAL STUDIES:    --< from: Xray Chest 1 View AP/PA (08.26.20 @ 10:03) >    EXAM:  XR CHEST AP OR PA 1V                            PROCEDURE DATE:  08/26/2020          INTERPRETATION:  PROCEDURE: AP view of the chest.    CLINICAL INFORMATION: Admission.    COMPARISON: 04/30/2017.    FINDINGS:    Lungs: The lungs are clear.Mild elevation of the right hemidiaphragm.  Heart: The heart is normal in size. Loop recorder overlies the heart.  Mediastinum: The mediastinum is within normal limits.    IMPRESSION:  Mild elevation of the right hemidiaphragm. No focal consolidation.    < from: Xray Foot AP + Lateral + Oblique, Bilat (08.26.20 @ 10:03) >  EXAM:  FOOT BILATERAL (MINIMUM 3 V)                            PROCEDURE DATE:  08/26/2020          INTERPRETATION:  Bilateral feet    HISTORY: Diabetic toe ulcer    Comparison: 12/17/2019    Three views of the right foot and three views of the left foot show no evidence of acute fracture. Surgical clip projects near the right tarsonavicular bone. The right joint spaces are maintained.    There is erosion of the tuft of the left third distal phalanx. Evidence of left second toe and fourth metatarsal partial amputations are again noted.    IMPRESSION: Erosion, left third distal phalanx which could indicate osteomyelitis in the proper clinical setting.

## 2020-08-26 NOTE — HISTORY OF PRESENT ILLNESS
[FreeTextEntry1] : Patient has DFU 3 distal left 3rd toe that probes to bone ( clinical OM ) skin, subcutaneous,fat and bone  and is getting swollen and increased drainage , the 2nd toe of the right foot has a DFU 2 , skin, subcutaneous and fat and is also suspect for early OM . Patient has had the ulcers for several months and they have been getting worse .

## 2020-08-26 NOTE — ED ADULT TRIAGE NOTE - SOURCE OF INFORMATION
Patient Patient's wife requesting a change in date for patient's colonoscopy from January 19 to February 7. Pls call wife at [phone].    **Wife is an employee.

## 2020-08-26 NOTE — ED PROVIDER NOTE - PHYSICAL EXAMINATION
Constitutional: Awake, Alert, non-toxic. NAD. Well appearing, well nourished.   HEAD: Normocephalic, atraumatic.   EYES: EOM intact, conjunctiva and sclera are clear bilaterally.   ENT: No rhinorrhea, patent, mucous membranes pink/moist, no drooling or stridor.   NECK: Supple, non-tender  CARDIOVASCULAR: Normal S1, S2; regular rate and rhythm.  RESPIRATORY: Normal respiratory effort; breath sounds CTAB, no wheezes, rhonchi, or rales. Speaking in full sentences. No accessory muscle use.   ABDOMEN: Soft; non-tender, non-distended.   EXTREMITIES: Full passive and active ROM in all extremities; non-tender to palpation; distal pulses palpable and symmetric  SKIN: Warm, dry; good skin turgor, (+) ulcer to distal left 3rd toe and right 2nd toe, (+) discharge and erythema, no TTP, no swelling  no ecchymosis, brisk capillary refill.  NEURO: A&O x3. Sensory and motor functions are grossly intact. Speech is normal. Appearance and judgement seem appropriate for gender and age.

## 2020-08-26 NOTE — ADVANCED PRACTICE NURSE CONSULT - ASSESSMENT
Vital Signs Last 24 Hrs  T(C): 37.1 (26 Aug 2020 09:23), Max: 37.1 (26 Aug 2020 09:23)  T(F): 98.8 (26 Aug 2020 09:23), Max: 98.8 (26 Aug 2020 09:23)  HR: 64 (26 Aug 2020 09:23) (64 - 64)  BP: 129/71 (26 Aug 2020 09:23) (129/71 - 129/71)  BP(mean): --  RR: 18 (26 Aug 2020 09:23) (18 - 18)  SpO2: 94% (26 Aug 2020 09:23) (94% - 94%)     eGFR if Non African American: 57 mL/min/1.73M2 (08-26-20 @ 10:34)  eGFR if African American: 66 mL/min/1.73M2 (08-26-20 @ 10:34)      CAPILLARY BLOOD GLUCOSE      POCT Blood Glucose.: 133 mg/dL (26 Aug 2020 14:34)      DIET: CC  %

## 2020-08-26 NOTE — H&P ADULT - PROBLEM SELECTOR PLAN 5
s/p right renal transplant for CKD (2013)  -continue home azathioprine and tacrolimus chronic, on insulin pump  -Pat Weil diabetes educator to see patient  -Endo Dr. Perlman consulted, f/u recs  -follow up A1C, 7.1 in 12/2019 chronic, on insulin pump  -Pat Weil diabetes educator to see patient  -Endo Dr. Perlman consulted, f/u recs  -follow up A1C, 7.1 in 12/2019  -Nutritional Eval.

## 2020-08-26 NOTE — ASSESSMENT
[Verbal] : Verbal [Written] : Written [Demo] : Demo [Patient] : Patient [Fair - mild discomfort, physical impairment, low acceptance] : Fair - mild discomfort, physical impairment, low acceptance [Dressing changes] : dressing changes [Family member] : Family member [Needs reinforcement] : needs reinforcement [Foot Care] : foot care [Signs and symptoms of infection] : sign and symptoms of infection [Skin Care] : skin care [Surgery] : surgery [Nutrition] : nutrition [How and When to Call] : how and when to call [Pain Management] : pain management [Off-loading] : off-loading [Hospitalization] : hospitalization [Patient responsibility to plan of care] : patient responsibility to plan of care [Glycemic Control] : glycemic control [Not Applicable - Long Term Care/Home Health Agency] : Long Term Care/Home Health Agency: Not Applicable [Stable] : stable [Ambulatory] : Ambulatory [Home] : Home [FreeTextEntry2] : Infection prevention\par Localized wound care \par Goal of remaining pain free regarding wounds.\par Offloading \par Low glycemic diet  [] : No [FreeTextEntry3] : Patient presents with 2 new wounds.  [FreeTextEntry4] : Patient is to arrive at [facility] tomorrow at 1000hr to be admitted to [place] ED for left foot distal 3rd digit distal amp and possible right foot 2nd digit distal amp. \par Follow up post hospitalization.

## 2020-08-26 NOTE — ADVANCED PRACTICE NURSE CONSULT - REASON FOR CONSULT
54y    Male    Patient is a 54y old  Male who presents with a chief complaint of r/o osteomyelitis (26 Aug 2020 15:15)      Type:1 DX 30 years ago.  known complications: DFU currently, hx kidney transplant. Endocrine: Radha at [facility] sees every 3 mos. seen today prior to admission. Rx home: humalog medtronic pump x 1.5-2 years. [employer]tr[employer] company set pump up. No ISF, CR, targets only basal rate 3 unit/hr. No Hx DKA/HHS, Glucometer checks- 3-4 x day, denies any needs, wife to bring in extra supplies. no change in weight.  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 Noticed the wounds to the distal L 3rd digit about 2 months ago. Patient has been applying collagenase but showed no improvement. Has been seeing a podiatrist outpatient but wounds are not healing so was sent to wound care for additional management. Sent in by Dr. Araiza today to r/o osteomyelitis. Patient also has a burn rash on his chest that he acquired while cooking with oil 3 weeks ago. Scheduled for MRI of left ankle but patient states he is unable to have MRI due to having metal coils in his left inner ear. Denies fevers, chills, SOB, chest pain, pain in distal digital wounds.    In the ED, VS T98.8F HR 64 /71 RR 18 SpO2 94% on RA. Labs significant for mildly elevated ESR 25, normal WBC 7.44, BUN/Cr 26/1.40,   Received Vanco and Zosyn x1 in ED.  CXR showing mildly elevated R hemidiaphragm. No focal consolidation.  EKG showing 1st degree AV block (26 Aug 2020 12:52)      PAST MEDICAL & SURGICAL HISTORY:  History of DVT (deep vein thrombosis)  Squamous cell carcinoma of skin of left ear  CVA (cerebral vascular accident)  Obesity (BMI 30-39.9)  Diabetic peripheral neuropathy  CKD (chronic kidney disease)  Diabetes mellitus  Hyperlipidemia  Hypertension  S/P kidney transplant  End-stage renal failure with renal transplant: 12/2013  H/O foot surgery: 2005 - right foot - bone fracture - s/p ORIF and subsequent removal of hardware  Vasectomy status: s/p b/l  vasectomy  Ear disease: Left inner ear surgery  Toe infection: 2007 L 4th Toe surgery-amputation secondary to osteomyelitis      MEDICATIONS  (STANDING):  aspirin 325 milliGRAM(s) Oral daily  atorvastatin 10 milliGRAM(s) Oral at bedtime  azaTHIOprine 100 milliGRAM(s) Oral daily  cloNIDine 0.1 milliGRAM(s) Oral every 12 hours  dextrose 5%. 1000 milliLiter(s) (50 mL/Hr) IV Continuous <Continuous>  dextrose 50% Injectable 12.5 Gram(s) IV Push once  dextrose 50% Injectable 25 Gram(s) IV Push once  dextrose 50% Injectable 25 Gram(s) IV Push once  famotidine    Tablet 20 milliGRAM(s) Oral daily  gabapentin 300 milliGRAM(s) Oral two times a day  hydrALAZINE 25 milliGRAM(s) Oral every 8 hours  insulin lispro (HumaLOG) Pump 1 Each SubCutaneous Continuous Pump  losartan 25 milliGRAM(s) Oral daily  metoprolol tartrate 75 milliGRAM(s) Oral two times a day  tacrolimus 1.5 milliGRAM(s) Oral two times a day  trimethoprim   80 mG/sulfamethoxazole 400 mG 1 Tablet(s) Oral daily 54y    Male    Patient is a 54y old  Male who presents with a chief complaint of r/o osteomyelitis (26 Aug 2020 15:15)      Type:1 DX 30 years ago.  known complications: DFU currently, hx kidney transplant. Endocrine: Radha at [facility] sees every 3 mos. seen today prior to admission. Rx home: humalog medtronic pump x 1.5-2 years. Medtr[employer] company set pump up. No ISF, CR, targets only basal rate 3 unit/hr. Boluses for meals between 8-10 units, does not enter carbs/BG for pump to calculate. No Hx DKA/HHS, Glucometer checks- 3-4 x day, denies any needs, wife to bring in extra supplies. no change in weight.  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 Noticed the wounds to the distal L 3rd digit about 2 months ago. Patient has been applying collagenase but showed no improvement. Has been seeing a podiatrist outpatient but wounds are not healing so was sent to wound care for additional management. Sent in by Dr. Araiza today to r/o osteomyelitis. Patient also has a burn rash on his chest that he acquired while cooking with oil 3 weeks ago. Scheduled for MRI of left ankle but patient states he is unable to have MRI due to having metal coils in his left inner ear. Denies fevers, chills, SOB, chest pain, pain in distal digital wounds.    In the ED, VS T98.8F HR 64 /71 RR 18 SpO2 94% on RA. Labs significant for mildly elevated ESR 25, normal WBC 7.44, BUN/Cr 26/1.40,   Received Vanco and Zosyn x1 in ED.  CXR showing mildly elevated R hemidiaphragm. No focal consolidation.  EKG showing 1st degree AV block (26 Aug 2020 12:52)      PAST MEDICAL & SURGICAL HISTORY:  History of DVT (deep vein thrombosis)  Squamous cell carcinoma of skin of left ear  CVA (cerebral vascular accident)  Obesity (BMI 30-39.9)  Diabetic peripheral neuropathy  CKD (chronic kidney disease)  Diabetes mellitus  Hyperlipidemia  Hypertension  S/P kidney transplant  End-stage renal failure with renal transplant: 12/2013  H/O foot surgery: 2005 - right foot - bone fracture - s/p ORIF and subsequent removal of hardware  Vasectomy status: s/p b/l  vasectomy  Ear disease: Left inner ear surgery  Toe infection: 2007 L 4th Toe surgery-amputation secondary to osteomyelitis      MEDICATIONS  (STANDING):  aspirin 325 milliGRAM(s) Oral daily  atorvastatin 10 milliGRAM(s) Oral at bedtime  azaTHIOprine 100 milliGRAM(s) Oral daily  cloNIDine 0.1 milliGRAM(s) Oral every 12 hours  dextrose 5%. 1000 milliLiter(s) (50 mL/Hr) IV Continuous <Continuous>  dextrose 50% Injectable 12.5 Gram(s) IV Push once  dextrose 50% Injectable 25 Gram(s) IV Push once  dextrose 50% Injectable 25 Gram(s) IV Push once  famotidine    Tablet 20 milliGRAM(s) Oral daily  gabapentin 300 milliGRAM(s) Oral two times a day  hydrALAZINE 25 milliGRAM(s) Oral every 8 hours  insulin lispro (HumaLOG) Pump 1 Each SubCutaneous Continuous Pump  losartan 25 milliGRAM(s) Oral daily  metoprolol tartrate 75 milliGRAM(s) Oral two times a day  tacrolimus 1.5 milliGRAM(s) Oral two times a day  trimethoprim   80 mG/sulfamethoxazole 400 mG 1 Tablet(s) Oral daily 54y    Male    Patient is a 54y old  Male who presents with a chief complaint of r/o osteomyelitis (26 Aug 2020 15:15)      Type:1 DX 30 years ago.  known complications: DFU currently, hx kidney transplant. Endocrine: Radha at [facility] sees every 3 mos. seen today prior to admission. Rx home: humalog medtronic pump x 1.5-2 years. Medtronic company set pump up. No ISF, CR, targets only basal rate 3 unit/hr. Boluses for meals between 8-10 units, does not enter carbs/BG for pump to calculate. No Hx DKA/HHS, Glucometer checks- 3-4 x day, denies any needs, wife to bring in extra supplies. no change in weight. 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 Noticed the wounds to the distal L 3rd digit about 2 months ago. Patient has been applying collagenase but showed no improvement. Has been seeing a podiatrist outpatient but wounds are not healing so was sent to wound care for additional management. Sent in by Dr. Araiza today to r/o osteomyelitis. Patient also has a burn rash on his chest that he acquired while cooking with oil 3 weeks ago. Scheduled for MRI of left ankle but patient states he is unable to have MRI due to having metal coils in his left inner ear. Denies fevers, chills, SOB, chest pain, pain in distal digital wounds.    In the ED, VS T98.8F HR 64 /71 RR 18 SpO2 94% on RA. Labs significant for mildly elevated ESR 25, normal WBC 7.44, BUN/Cr 26/1.40,   Received Vanco and Zosyn x1 in ED.  CXR showing mildly elevated R hemidiaphragm. No focal consolidation.  EKG showing 1st degree AV block (26 Aug 2020 12:52)      PAST MEDICAL & SURGICAL HISTORY:  History of DVT (deep vein thrombosis)  Squamous cell carcinoma of skin of left ear  CVA (cerebral vascular accident)  Obesity (BMI 30-39.9)  Diabetic peripheral neuropathy  CKD (chronic kidney disease)  Diabetes mellitus  Hyperlipidemia  Hypertension  S/P kidney transplant  End-stage renal failure with renal transplant: 12/2013  H/O foot surgery: 2005 - right foot - bone fracture - s/p ORIF and subsequent removal of hardware  Vasectomy status: s/p b/l  vasectomy  Ear disease: Left inner ear surgery  Toe infection: 2007 L 4th Toe surgery-amputation secondary to osteomyelitis      MEDICATIONS  (STANDING):  aspirin 325 milliGRAM(s) Oral daily  atorvastatin 10 milliGRAM(s) Oral at bedtime  azaTHIOprine 100 milliGRAM(s) Oral daily  cloNIDine 0.1 milliGRAM(s) Oral every 12 hours  dextrose 5%. 1000 milliLiter(s) (50 mL/Hr) IV Continuous <Continuous>  dextrose 50% Injectable 12.5 Gram(s) IV Push once  dextrose 50% Injectable 25 Gram(s) IV Push once  dextrose 50% Injectable 25 Gram(s) IV Push once  famotidine    Tablet 20 milliGRAM(s) Oral daily  gabapentin 300 milliGRAM(s) Oral two times a day  hydrALAZINE 25 milliGRAM(s) Oral every 8 hours  insulin lispro (HumaLOG) Pump 1 Each SubCutaneous Continuous Pump  losartan 25 milliGRAM(s) Oral daily  metoprolol tartrate 75 milliGRAM(s) Oral two times a day  tacrolimus 1.5 milliGRAM(s) Oral two times a day  trimethoprim   80 mG/sulfamethoxazole 400 mG 1 Tablet(s) Oral daily

## 2020-08-26 NOTE — H&P ADULT - NSICDXFAMILYHX_GEN_ALL_CORE_FT
FAMILY HISTORY:  No pertinent family history in first degree relatives FAMILY HISTORY:  Family history of diabetes mellitus (DM)  FH: skin cancer

## 2020-08-26 NOTE — H&P ADULT - HISTORY OF PRESENT ILLNESS
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 Noticed the wounds to the distal L 3rd digit about 2 months ago. Has been seeing a podiatrist outpatient but wounds are not healing so was sent to wound care for additional management. Sent in by Dr. Araiza today to r/o osteomyelitis. Denies fevers, chills, pain in distal digital wounds.    In the ED, VS T98.8F HR 64 /71 RR 18 SpO2 94% on RA. Labs significant for mildly elevated ESR 25, normal WBC 7.44, BUN/Cr 26/1.40,   Received Vanco and Zosyn x1 in ED.  CXR showing mildly elevated R hemidiaphragm. No focal consolidation.  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 Noticed the wounds to the distal L 3rd digit about 2 months ago. Patient has been applying collagenase but showed no improvement. Has been seeing a podiatrist outpatient but wounds are not healing so was sent to wound care for additional management. Sent in by Dr. Araiza today to r/o osteomyelitis. Patient also has a burn rash on his chest that he acquired while cooking with oil 3 weeks ago. Scheduled for MRI of left ankle but patient states he is unable to have MRI due to having metal coils in his left inner ear. Denies fevers, chills, SOB, chest pain, pain in distal digital wounds.    In the ED, VS T98.8F HR 64 /71 RR 18 SpO2 94% on RA. Labs significant for mildly elevated ESR 25, normal WBC 7.44, BUN/Cr 26/1.40,   Received Vanco and Zosyn x1 in ED.  CXR showing mildly elevated R hemidiaphragm. No focal consolidation.  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 Noticed the wounds to the distal L 3rd digit about 2 months ago. Patient has been applying collagenase but showed no improvement. Has been seeing a podiatrist outpatient but wounds are not healing so was sent to wound care for additional management. Sent in by Dr. Araiza today to r/o osteomyelitis. & Possible surgery/amputation of left foot 3rd toe distal digit & Rt foot 2nd toe distal digit . Patient also has a burn rash on his chest that he acquired while cooking with oil 3 weeks ago. Scheduled for MRI of left foot but patient states he is unable to have MRI due to having metal coils in his left inner ear. Denies fevers, chills, SOB, chest pain, pain in distal digital wounds.    In the ED, VS T98.8F HR 64 /71 RR 18 SpO2 94% on RA. Labs significant for mildly elevated ESR 25, normal WBC 7.44, BUN/Cr 26/1.40,   Received Vanco and Zosyn x1 in ED.  CXR showing mildly elevated R hemidiaphragm. No focal consolidation.  EKG showing 1st degree AV block

## 2020-08-26 NOTE — CONSULT NOTE ADULT - PROBLEM SELECTOR RECOMMENDATION 3
LMTCB Will sent pt My Chart message. MRI right knee approved by insurance. Pt can call [phone] to schedule exam. Must be completed by 5/3/19. renal involved

## 2020-08-26 NOTE — H&P ADULT - NSICDXPASTMEDICALHX_GEN_ALL_CORE_FT
PAST MEDICAL HISTORY:  CKD (chronic kidney disease)     CVA (cerebral vascular accident)     Diabetes mellitus     Diabetic peripheral neuropathy     Hyperlipidemia     Hypertension     Obesity (BMI 30-39.9) PAST MEDICAL HISTORY:  CKD (chronic kidney disease)     CVA (cerebral vascular accident)     Diabetes mellitus     Diabetic peripheral neuropathy     History of DVT (deep vein thrombosis)     Hyperlipidemia     Hypertension     Obesity (BMI 30-39.9)     Squamous cell carcinoma of skin of left ear

## 2020-08-26 NOTE — H&P ADULT - RS GEN PE MLT RESP DETAILS PC
normal/breath sounds equal/clear to auscultation bilaterally/respirations non-labored/no wheezes/no intercostal retractions no wheezes/no rales/normal/clear to auscultation bilaterally/respirations non-labored/no intercostal retractions/breath sounds equal/no rhonchi

## 2020-08-26 NOTE — PHYSICAL EXAM
[2 x 2] : 2 x 2  [Normal Breath Sounds] : Normal breath sounds [1+] : left 1+ [Varicose Veins Of Lower Extremities] : bilaterally [Ankle Swelling On The Right] : mild [Skin Ulcer] : ulcer [Alert] : alert [Oriented to Place] : oriented to place [Oriented to Person] : oriented to person [Oriented to Time] : oriented to time [Calm] : calm [Purpura] : no purpura  [Petechiae] : no petechiae [de-identified] : calm  [de-identified] : Hammer toe deformities  [de-identified] : DFU 2 distal right toe and DFU distal left 3rd toe  [de-identified] : IDDM with neuropathy  [FreeTextEntry1] : Right medial leg - Dry scab - No open wound  [de-identified] : no treatment  [FreeTextEntry9] : 0.5 [FreeTextEntry8] : 0.7 [FreeTextEntry7] : Left foot distal 3rd digit  [de-identified] : 0.3 to bone [de-identified] : Serous/sanguinous [de-identified] : 50% [de-identified] : Silver alginate [de-identified] : Right foot distal 2nd digit  [de-identified] : Cleansed with NS\par Cloth tape  [de-identified] : 0.2 [de-identified] : 0.2 [de-identified] : 0.1 [de-identified] : Scant  [de-identified] : Callus  [de-identified] : Silver alginate [de-identified] : Normal [de-identified] : Small [de-identified] : Cleansed with NS\par Cloth tape  [de-identified] : None [de-identified] : None [de-identified] : Yes [de-identified] : 50% [de-identified] : 3x Weekly [de-identified] : other [de-identified] : Primary Dressing [de-identified] : None [de-identified] : None [de-identified] : 100% [de-identified] : 3x Weekly [de-identified] : No [de-identified] : Primary Dressing

## 2020-08-26 NOTE — PATIENT PROFILE ADULT - NSPROEDALEARNPREF_GEN_A_NUR
Brief Postoperative Note    Patient: Regan Bonds  YOB: 1983  MRN: [id number]    Date of Procedure: 6/12/2020     Pre-Op Diagnosis:  Symptomatic Cholelithiasis. Post-Op Diagnosis:  Same. Procedure(s):   Laparoscopic Cholecystectomy. Intra Operative Cholangiogram.    Surgeon(s):  Lily Ortiz MD    Surgical Assistant:  Saleem Johnson SA. Anesthesia: General     Estimated Blood Loss (mL): Approx. 20 ml. Complications: None    Specimens:   ID Type Source Tests Collected by Time Destination   1 : Gallbladder Fresh Gallbladder  Lily Ortiz MD 6/12/2020 1251 Pathology        Implants: * No implants in log *    Drains: * No LDAs found *    Findings: Hepatic steatosis. Chronic cholecystitis.  No apparent retained CBD stone on cholangiogram.     Electronically Signed by Honey Uribe MD on 6/12/2020 at 1:19 PM
skill demonstration/individual instruction/verbal instruction

## 2020-08-26 NOTE — H&P ADULT - PROBLEM SELECTOR PLAN 9
Heparin SubQ for DVT ppx  IMPROVE VTE Individual Risk Assessment    RISK                                                                Points  [  ] Previous VTE                                                  3  [  ] Thrombophilia                                               2  [  ] Lower limb paralysis                                      2        (unable to hold up >15 seconds)    [  ] Current Cancer                                              2         (within 6 months)  [  ] Immobilization > 24 hrs                                1  [  ] ICU/CCU stay > 24 hours                              1  [  ] Age > 60                                                      1  IMPROVE VTE Score: 0    IMPROVE Score 0-1: Low Risk, No VTE prophylaxis required for most patients, encourage ambulation.   IMPROVE Score 2-3: At risk, pharmacologic VTE prophylaxis is indicated for most patients (in the absence of a contraindication)  IMPROVE Score > or = 4: High Risk, pharmacologic VTE prophylaxis is indicated for most patients (in the absence of a contraindication)

## 2020-08-26 NOTE — H&P ADULT - CONSTITUTIONAL DETAILS
no distress no distress/well-groomed/well-developed well-groomed/well-nourished/no distress/obese/well-developed

## 2020-08-26 NOTE — H&P ADULT - EXTREMITIES COMMENTS
Left foot 3rd toe wound + Probes to Bone  Rt Foot 2nd toe -distal digit swelling, erythema & warm, no drainage  -Rt Mid shin - abrasion dry.

## 2020-08-26 NOTE — CONSULT NOTE ADULT - PROBLEM SELECTOR RECOMMENDATION 5
good control- coordinate with medicine    Thank you for consulting us and involving us in the management of this most interesting and challenging case.     We will follow along in the care of this patient.

## 2020-08-26 NOTE — REVIEW OF SYSTEMS
[Joint Stiffness] : joint stiffness [Fever] : no fever [Chills] : no chills [Eye Pain] : no eye pain [Loss Of Hearing] : no hearing loss [Shortness Of Breath] : no shortness of breath [Abdominal Pain] : no abdominal pain [Easy Bleeding] : no tendency for easy bleeding [Anxiety] : no anxiety [FreeTextEntry5] : HTN , HLD , diabetic cardio vascular disease  [FreeTextEntry8] : Kidney transplant  [FreeTextEntry9] : Patient has deformities of the forefoot bilateral , hammer toes and previous amputations for OM  [de-identified] : DFU 2 right 2nd ( ssf) and DFU 3 distal left 3rd probes to bone  [de-identified] : Diabetic neuropathy , IDDM  [de-identified] : IDDM , kidney transplant

## 2020-08-26 NOTE — H&P ADULT - PROBLEM SELECTOR PLAN 8
chronic, initial BP wnl  -continue home clonidine, and metoprolol and losartan Heparin SubQ for DVT ppx  IMPROVE VTE Individual Risk Assessment    RISK                                                                Points  [  ] Previous VTE                                                  3  [  ] Thrombophilia                                               2  [  ] Lower limb paralysis                                      2        (unable to hold up >15 seconds)    [  ] Current Cancer                                              2         (within 6 months)  [  ] Immobilization > 24 hrs                                1  [  ] ICU/CCU stay > 24 hours                              1  [  ] Age > 60                                                      1  IMPROVE VTE Score: 0    IMPROVE Score 0-1: Low Risk, No VTE prophylaxis required for most patients, encourage ambulation.   IMPROVE Score 2-3: At risk, pharmacologic VTE prophylaxis is indicated for most patients (in the absence of a contraindication)  IMPROVE Score > or = 4: High Risk, pharmacologic VTE prophylaxis is indicated for most patients (in the absence of a contraindication)

## 2020-08-26 NOTE — H&P ADULT - GASTROINTESTINAL DETAILS
no guarding/nontender/no masses palpable/soft/no distention/bowel sounds normal/no rebound tenderness soft/nontender/no distention/no masses palpable/Scar + Obese/no guarding/no organomegaly/no bruit/no rebound tenderness/no rigidity/bowel sounds normal

## 2020-08-26 NOTE — PLAN
[FreeTextEntry1] : Patient to return tomorrow am , for admission for infected 3rd toe left foot and DFU 2 distal right 2nd . IV antibiotics and distal amputation of the right 2nd toe and the left 3rd toe

## 2020-08-26 NOTE — ED ADULT NURSE NOTE - OBJECTIVE STATEMENT
patient stable able to make needs known. has insulin pump NP came & did assessment of site. patient educated on procedure of pump in facility. Burns noted to abdomen  MSpencer

## 2020-08-27 ENCOUNTER — RESULT REVIEW (OUTPATIENT)
Age: 55
End: 2020-08-27

## 2020-08-27 LAB
A1C WITH ESTIMATED AVERAGE GLUCOSE RESULT: 8.2 % — HIGH (ref 4–5.6)
ANION GAP SERPL CALC-SCNC: 3 MMOL/L — LOW (ref 5–17)
BASOPHILS # BLD AUTO: 0.07 K/UL — SIGNIFICANT CHANGE UP (ref 0–0.2)
BASOPHILS NFR BLD AUTO: 1 % — SIGNIFICANT CHANGE UP (ref 0–2)
BUN SERPL-MCNC: 23 MG/DL — SIGNIFICANT CHANGE UP (ref 7–23)
CALCIUM SERPL-MCNC: 8.7 MG/DL — SIGNIFICANT CHANGE UP (ref 8.5–10.1)
CHLORIDE SERPL-SCNC: 107 MMOL/L — SIGNIFICANT CHANGE UP (ref 96–108)
CO2 SERPL-SCNC: 31 MMOL/L — SIGNIFICANT CHANGE UP (ref 22–31)
CREAT SERPL-MCNC: 1.2 MG/DL — SIGNIFICANT CHANGE UP (ref 0.5–1.3)
EOSINOPHIL # BLD AUTO: 0.4 K/UL — SIGNIFICANT CHANGE UP (ref 0–0.5)
EOSINOPHIL NFR BLD AUTO: 5.7 % — SIGNIFICANT CHANGE UP (ref 0–6)
ESTIMATED AVERAGE GLUCOSE: 189 MG/DL — HIGH (ref 68–114)
GLUCOSE SERPL-MCNC: 95 MG/DL — SIGNIFICANT CHANGE UP (ref 70–99)
GRAM STN FLD: SIGNIFICANT CHANGE UP
GRAM STN FLD: SIGNIFICANT CHANGE UP
HCT VFR BLD CALC: 41 % — SIGNIFICANT CHANGE UP (ref 39–50)
HGB BLD-MCNC: 13.2 G/DL — SIGNIFICANT CHANGE UP (ref 13–17)
HIV 1 & 2 AB SERPL IA.RAPID: SIGNIFICANT CHANGE UP
IMM GRANULOCYTES NFR BLD AUTO: 0.4 % — SIGNIFICANT CHANGE UP (ref 0–1.5)
LYMPHOCYTES # BLD AUTO: 1.24 K/UL — SIGNIFICANT CHANGE UP (ref 1–3.3)
LYMPHOCYTES # BLD AUTO: 17.7 % — SIGNIFICANT CHANGE UP (ref 13–44)
MCHC RBC-ENTMCNC: 28.6 PG — SIGNIFICANT CHANGE UP (ref 27–34)
MCHC RBC-ENTMCNC: 32.2 GM/DL — SIGNIFICANT CHANGE UP (ref 32–36)
MCV RBC AUTO: 88.7 FL — SIGNIFICANT CHANGE UP (ref 80–100)
MONOCYTES # BLD AUTO: 0.61 K/UL — SIGNIFICANT CHANGE UP (ref 0–0.9)
MONOCYTES NFR BLD AUTO: 8.7 % — SIGNIFICANT CHANGE UP (ref 2–14)
NEUTROPHILS # BLD AUTO: 4.67 K/UL — SIGNIFICANT CHANGE UP (ref 1.8–7.4)
NEUTROPHILS NFR BLD AUTO: 66.5 % — SIGNIFICANT CHANGE UP (ref 43–77)
NRBC # BLD: 0 /100 WBCS — SIGNIFICANT CHANGE UP (ref 0–0)
PLATELET # BLD AUTO: 200 K/UL — SIGNIFICANT CHANGE UP (ref 150–400)
POTASSIUM SERPL-MCNC: 4.2 MMOL/L — SIGNIFICANT CHANGE UP (ref 3.5–5.3)
POTASSIUM SERPL-SCNC: 4.2 MMOL/L — SIGNIFICANT CHANGE UP (ref 3.5–5.3)
RBC # BLD: 4.62 M/UL — SIGNIFICANT CHANGE UP (ref 4.2–5.8)
RBC # FLD: 13.2 % — SIGNIFICANT CHANGE UP (ref 10.3–14.5)
SARS-COV-2 IGG SERPL QL IA: NEGATIVE — SIGNIFICANT CHANGE UP
SARS-COV-2 IGM SERPL IA-ACNC: <0.2 RATIO — SIGNIFICANT CHANGE UP
SODIUM SERPL-SCNC: 141 MMOL/L — SIGNIFICANT CHANGE UP (ref 135–145)
SPECIMEN SOURCE: SIGNIFICANT CHANGE UP
SPECIMEN SOURCE: SIGNIFICANT CHANGE UP
TACROLIMUS SERPL-MCNC: 5.8 NG/ML — SIGNIFICANT CHANGE UP
WBC # BLD: 7.02 K/UL — SIGNIFICANT CHANGE UP (ref 3.8–10.5)
WBC # FLD AUTO: 7.02 K/UL — SIGNIFICANT CHANGE UP (ref 3.8–10.5)

## 2020-08-27 PROCEDURE — 88311 DECALCIFY TISSUE: CPT | Mod: 26

## 2020-08-27 PROCEDURE — 73630 X-RAY EXAM OF FOOT: CPT | Mod: 26,50

## 2020-08-27 PROCEDURE — 99223 1ST HOSP IP/OBS HIGH 75: CPT

## 2020-08-27 PROCEDURE — 88304 TISSUE EXAM BY PATHOLOGIST: CPT | Mod: 26

## 2020-08-27 PROCEDURE — 28825 PARTIAL AMPUTATION OF TOE: CPT | Mod: T6

## 2020-08-27 RX ORDER — DEXTROSE 50 % IN WATER 50 %
25 SYRINGE (ML) INTRAVENOUS ONCE
Refills: 0 | Status: DISCONTINUED | OUTPATIENT
Start: 2020-08-27 | End: 2020-08-31

## 2020-08-27 RX ORDER — SODIUM CHLORIDE 9 MG/ML
1000 INJECTION, SOLUTION INTRAVENOUS
Refills: 0 | Status: DISCONTINUED | OUTPATIENT
Start: 2020-08-27 | End: 2020-08-31

## 2020-08-27 RX ORDER — LOSARTAN POTASSIUM 100 MG/1
25 TABLET, FILM COATED ORAL DAILY
Refills: 0 | Status: DISCONTINUED | OUTPATIENT
Start: 2020-08-27 | End: 2020-08-29

## 2020-08-27 RX ORDER — ASPIRIN/CALCIUM CARB/MAGNESIUM 324 MG
325 TABLET ORAL DAILY
Refills: 0 | Status: DISCONTINUED | OUTPATIENT
Start: 2020-08-27 | End: 2020-08-31

## 2020-08-27 RX ORDER — GABAPENTIN 400 MG/1
300 CAPSULE ORAL
Refills: 0 | Status: DISCONTINUED | OUTPATIENT
Start: 2020-08-27 | End: 2020-08-31

## 2020-08-27 RX ORDER — INSULIN LISPRO 100/ML
1 VIAL (ML) SUBCUTANEOUS
Refills: 0 | Status: DISCONTINUED | OUTPATIENT
Start: 2020-08-27 | End: 2020-08-31

## 2020-08-27 RX ORDER — SODIUM CHLORIDE 9 MG/ML
1000 INJECTION INTRAMUSCULAR; INTRAVENOUS; SUBCUTANEOUS
Refills: 0 | Status: DISCONTINUED | OUTPATIENT
Start: 2020-08-27 | End: 2020-08-28

## 2020-08-27 RX ORDER — DEXTROSE 50 % IN WATER 50 %
15 SYRINGE (ML) INTRAVENOUS ONCE
Refills: 0 | Status: DISCONTINUED | OUTPATIENT
Start: 2020-08-27 | End: 2020-08-31

## 2020-08-27 RX ORDER — METOPROLOL TARTRATE 50 MG
75 TABLET ORAL
Refills: 0 | Status: DISCONTINUED | OUTPATIENT
Start: 2020-08-27 | End: 2020-08-31

## 2020-08-27 RX ORDER — GLUCAGON INJECTION, SOLUTION 0.5 MG/.1ML
1 INJECTION, SOLUTION SUBCUTANEOUS ONCE
Refills: 0 | Status: DISCONTINUED | OUTPATIENT
Start: 2020-08-27 | End: 2020-08-31

## 2020-08-27 RX ORDER — DEXTROSE 50 % IN WATER 50 %
12.5 SYRINGE (ML) INTRAVENOUS ONCE
Refills: 0 | Status: DISCONTINUED | OUTPATIENT
Start: 2020-08-27 | End: 2020-08-31

## 2020-08-27 RX ORDER — SODIUM CHLORIDE 9 MG/ML
1000 INJECTION, SOLUTION INTRAVENOUS
Refills: 0 | Status: DISCONTINUED | OUTPATIENT
Start: 2020-08-27 | End: 2020-08-27

## 2020-08-27 RX ORDER — AZATHIOPRINE 100 MG/1
100 TABLET ORAL DAILY
Refills: 0 | Status: DISCONTINUED | OUTPATIENT
Start: 2020-08-27 | End: 2020-08-31

## 2020-08-27 RX ORDER — HYDRALAZINE HCL 50 MG
25 TABLET ORAL ONCE
Refills: 0 | Status: COMPLETED | OUTPATIENT
Start: 2020-08-27 | End: 2020-08-27

## 2020-08-27 RX ORDER — HYDRALAZINE HCL 50 MG
25 TABLET ORAL EVERY 8 HOURS
Refills: 0 | Status: DISCONTINUED | OUTPATIENT
Start: 2020-08-27 | End: 2020-08-31

## 2020-08-27 RX ORDER — ACETAMINOPHEN 500 MG
1000 TABLET ORAL ONCE
Refills: 0 | Status: DISCONTINUED | OUTPATIENT
Start: 2020-08-27 | End: 2020-08-27

## 2020-08-27 RX ORDER — MORPHINE SULFATE 50 MG/1
1 CAPSULE, EXTENDED RELEASE ORAL
Refills: 0 | Status: DISCONTINUED | OUTPATIENT
Start: 2020-08-27 | End: 2020-08-27

## 2020-08-27 RX ORDER — INSULIN LISPRO 100/ML
1 VIAL (ML) SUBCUTANEOUS
Refills: 0 | Status: DISCONTINUED | OUTPATIENT
Start: 2020-08-27 | End: 2020-08-27

## 2020-08-27 RX ORDER — HEPARIN SODIUM 5000 [USP'U]/ML
5000 INJECTION INTRAVENOUS; SUBCUTANEOUS EVERY 8 HOURS
Refills: 0 | Status: DISCONTINUED | OUTPATIENT
Start: 2020-08-27 | End: 2020-08-31

## 2020-08-27 RX ORDER — TACROLIMUS 5 MG/1
1.5 CAPSULE ORAL
Refills: 0 | Status: DISCONTINUED | OUTPATIENT
Start: 2020-08-27 | End: 2020-08-31

## 2020-08-27 RX ORDER — ATORVASTATIN CALCIUM 80 MG/1
10 TABLET, FILM COATED ORAL AT BEDTIME
Refills: 0 | Status: DISCONTINUED | OUTPATIENT
Start: 2020-08-27 | End: 2020-08-31

## 2020-08-27 RX ORDER — FAMOTIDINE 10 MG/ML
20 INJECTION INTRAVENOUS DAILY
Refills: 0 | Status: DISCONTINUED | OUTPATIENT
Start: 2020-08-27 | End: 2020-08-31

## 2020-08-27 RX ORDER — CEFTRIAXONE 500 MG/1
2000 INJECTION, POWDER, FOR SOLUTION INTRAMUSCULAR; INTRAVENOUS EVERY 24 HOURS
Refills: 0 | Status: DISCONTINUED | OUTPATIENT
Start: 2020-08-26 | End: 2020-08-31

## 2020-08-27 RX ADMIN — Medication 0.1 MILLIGRAM(S): at 18:17

## 2020-08-27 RX ADMIN — Medication 25 MILLIGRAM(S): at 21:13

## 2020-08-27 RX ADMIN — Medication 25 MILLIGRAM(S): at 06:10

## 2020-08-27 RX ADMIN — HEPARIN SODIUM 5000 UNIT(S): 5000 INJECTION INTRAVENOUS; SUBCUTANEOUS at 22:32

## 2020-08-27 RX ADMIN — Medication 325 MILLIGRAM(S): at 18:17

## 2020-08-27 RX ADMIN — GABAPENTIN 300 MILLIGRAM(S): 400 CAPSULE ORAL at 06:10

## 2020-08-27 RX ADMIN — Medication 75 MILLIGRAM(S): at 18:17

## 2020-08-27 RX ADMIN — FAMOTIDINE 20 MILLIGRAM(S): 10 INJECTION INTRAVENOUS at 18:17

## 2020-08-27 RX ADMIN — LOSARTAN POTASSIUM 25 MILLIGRAM(S): 100 TABLET, FILM COATED ORAL at 18:17

## 2020-08-27 RX ADMIN — CEFTRIAXONE 100 MILLIGRAM(S): 500 INJECTION, POWDER, FOR SOLUTION INTRAMUSCULAR; INTRAVENOUS at 21:28

## 2020-08-27 RX ADMIN — AZATHIOPRINE 100 MILLIGRAM(S): 100 TABLET ORAL at 18:17

## 2020-08-27 RX ADMIN — Medication 0.1 MILLIGRAM(S): at 06:10

## 2020-08-27 RX ADMIN — TACROLIMUS 1.5 MILLIGRAM(S): 5 CAPSULE ORAL at 09:27

## 2020-08-27 RX ADMIN — Medication 25 MILLIGRAM(S): at 15:46

## 2020-08-27 RX ADMIN — TACROLIMUS 1.5 MILLIGRAM(S): 5 CAPSULE ORAL at 21:13

## 2020-08-27 RX ADMIN — Medication 25 MILLIGRAM(S): at 15:45

## 2020-08-27 RX ADMIN — ATORVASTATIN CALCIUM 10 MILLIGRAM(S): 80 TABLET, FILM COATED ORAL at 21:13

## 2020-08-27 RX ADMIN — SODIUM CHLORIDE 75 MILLILITER(S): 9 INJECTION INTRAMUSCULAR; INTRAVENOUS; SUBCUTANEOUS at 15:00

## 2020-08-27 RX ADMIN — Medication 1 TABLET(S): at 18:17

## 2020-08-27 RX ADMIN — SODIUM CHLORIDE 50 MILLILITER(S): 9 INJECTION, SOLUTION INTRAVENOUS at 08:30

## 2020-08-27 RX ADMIN — LOSARTAN POTASSIUM 25 MILLIGRAM(S): 100 TABLET, FILM COATED ORAL at 06:10

## 2020-08-27 RX ADMIN — GABAPENTIN 300 MILLIGRAM(S): 400 CAPSULE ORAL at 18:17

## 2020-08-27 NOTE — CONSULT NOTE ADULT - SUBJECTIVE AND OBJECTIVE BOX
Chief Complaint  Patient is a 54y old  Male who presents with a chief complaint of r/o osteomyelitis, scheudles for L third digit amputation and partial right 2nd digit amputation.  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 Noticed the wounds to the distal L 3rd digit about 2 months ago. Patient has been applying collagenase but showed no improvement. Has been seeing a podiatrist outpatient but wounds are not healing so was sent to wound care for additional management. Sent in by Dr. Araiza today to r/o osteomyelitis. & Possible surgery/amputation of left foot 3rd toe distal digit & Rt foot 2nd toe distal digit . Patient also has a burn rash on his chest that he acquired while cooking with oil 3 weeks ago. Scheduled for MRI of left foot but patient states he is unable to have MRI due to having metal coils in his left inner ear. Denies fevers, chills, SOB, chest pain, pain in distal digital wounds.    In the ED, VS T98.8F HR 64 /71 RR 18 SpO2 94% on RA. Labs significant for mildly elevated ESR 25, normal WBC 7.44, BUN/Cr 26/1.40,   Received Vanco and Zosyn x1 in ED.  CXR showing mildly elevated R hemidiaphragm. No focal consolidation.  EKG showing 1st degree AV block (26 Aug 2020 12:52)      Past Medical and Surgical History  PAST MEDICAL & SURGICAL HISTORY:  History of DVT (deep vein thrombosis)  Squamous cell carcinoma of skin of left ear  CVA (cerebral vascular accident)  Obesity (BMI 30-39.9)  Diabetic peripheral neuropathy  CKD (chronic kidney disease)  Diabetes mellitus  Hyperlipidemia  Hypertension  S/P kidney transplant  End-stage renal failure with renal transplant: 2013  H/O foot surgery:  - right foot - bone fracture - s/p ORIF and subsequent removal of hardware  Vasectomy status: s/p b/l  vasectomy  Ear disease: Left inner ear surgery  Toe infection: 2007 L 4th Toe surgery-amputation secondary to osteomyelitis      Family History  FAMILY HISTORY:  FH: skin cancer  Family history of diabetes mellitus (DM)      Social History  Type 2 diabetes mellitus with foot ulcer  history of DVT (deep vein thrombosis)  Squamous cell carcinoma of skin of left ear  CVA (cerebral vascular accident)  Obesity (BMI 30-39.9)  Diabetic peripheral neuropathy  CKD (chronic kidney disease)  Diabetes mellitus  Hyperlipidemia  Hypertension  Diabetic ulcer of toe  Type 1 diabetes mellitus with stage 5 chronic kidney disease not on chronic dialysis  Osteomyelitis  Need for prophylactic measure  Hypertension  Hyperlipidemia  Diabetes mellitus  Renal transplant recipient  CKD (chronic kidney disease)  Diabetic peripheral neuropathy  CVA (cerebral vascular accident)  Diabetic ulcer of toe  Diabetic ulcer of toe of left foot  S/P kidney transplant  End-stage renal failure with renal transplant  H/O foot surgery  Vasectomy status  Ear disease  Toe infection  SENT BY DR ARAIZA TOE INFECTION  90+  [email or web address]      MEDICATIONS  (STANDING):  aspirin 325 milliGRAM(s) Oral daily  atorvastatin 10 milliGRAM(s) Oral at bedtime  azaTHIOprine 100 milliGRAM(s) Oral daily  cefTRIAXone   IVPB 2000 milliGRAM(s) IV Intermittent every 24 hours  cloNIDine 0.1 milliGRAM(s) Oral every 12 hours  dextrose 5%. 1000 milliLiter(s) (50 mL/Hr) IV Continuous <Continuous>  dextrose 5%. 1000 milliLiter(s) (50 mL/Hr) IV Continuous <Continuous>  dextrose 50% Injectable 12.5 Gram(s) IV Push once  dextrose 50% Injectable 25 Gram(s) IV Push once  dextrose 50% Injectable 25 Gram(s) IV Push once  famotidine    Tablet 20 milliGRAM(s) Oral daily  gabapentin 300 milliGRAM(s) Oral two times a day  hydrALAZINE 25 milliGRAM(s) Oral every 8 hours  insulin lispro (HumaLOG) Pump 1 Each SubCutaneous Continuous Pump  losartan 25 milliGRAM(s) Oral daily  metoprolol tartrate 75 milliGRAM(s) Oral two times a day  tacrolimus 1.5 milliGRAM(s) Oral two times a day  trimethoprim   80 mG/sulfamethoxazole 400 mG 1 Tablet(s) Oral daily    MEDICATIONS  (PRN):  dextrose 40% Gel 15 Gram(s) Oral once PRN Blood Glucose LESS THAN 70 milliGRAM(s)/deciliter  glucagon  Injectable 1 milliGRAM(s) IntraMuscular once PRN Glucose LESS THAN 70 milligrams/deciliter      EC Lead ECG:   Ventricular Rate 61 BPM    Atrial Rate 61 BPM    P-R Interval 256 ms    QRS Duration 98 ms    Q-T Interval 394 ms    QTC Calculation(Bezet) 396 ms    P Axis 40 degrees    R Axis 2 degrees    T Axis 10 degrees    Diagnosis Line Sinus rhythm with 1stdegree AV block  Otherwise normal ECG  When compared with ECG of 16-DEC-2019 16:49,  No significant change was found  Confirmed by Marko Mauricio MD ([id number]) on 2020 12:17:37 PM (20 @ 10:21)    Interval HPI:   Patient states he is feeling well this morning.     ROS:  Constitutional: denies fevers, chills  HEENT: denies cough  Cardio: denies chest pain, palpatations   Pulmonary: CTA b/l no RRW   GI: soft NTND BS X 4  MSK: chronic edema lower extremities     Physical:   General: in NAD, lying comfortably in bed  HEENT: NCAT, MMM, no scleral icterus   Cardio: RRR, + S1/S2  Pulmonary: CTA b/l no RRW  GI: soft, NTND, BS X 4  MSK: 2 + pitting edema, pedal pulses 2+    METS score > 4  Revised Cardiac Risk score: 1       ECHO:   LABS:        -----------------------                        13.2   7.02  )-----------( 200      ( 27 Aug 2020 08:47 )             41.0     -----------------------      142  |  107  |  26<H>  ----------------------------<  113<H>  4.6   |  32<H>  |  1.40<H>    Ca    9.1      26 Aug 2020 10:34    TPro  7.8  /  Alb  3.5  /  TBili  0.6  /  DBili  x   /  AST  16  /  ALT  17  /  AlkPhos  111      -----------------------    -----------------------    ----------------------    ---------------------      RADIOLOGY & ADDITIONAL STUDIES:

## 2020-08-27 NOTE — BRIEF OPERATIVE NOTE - NSICDXBRIEFPROCEDURE_GEN_ALL_CORE_FT
PROCEDURES:  Partial amputation of left third toe 27-Aug-2020 14:39:54  Becki Campbell  Partial amputation of right second toe 27-Aug-2020 14:39:37  Becki Campbell

## 2020-08-27 NOTE — CONSULT NOTE ADULT - ASSESSMENT
54y old  Male who presents with a chief complaint of r/o osteomyelitis, schedules for L third digit amputation and partial right 2nd digit amputation.     - Patient denies recent MI, arrythmia, CHF or valvular disease   - ECst degree AV block, no ischemic changes   - METS score > 4  - PCI: 1   - Revised Cardiac risk score: 1   - Patient is low risk for a low risk procedure     Plan:  1. Cardiac Clearance for toe amputation 2/2 to osteomyelitis left 3rd digit and right partial 2nd digit   - patient is optimized for procedure today  - Patient is low risk for low risk procedure     2. History of CVA   - on  at home, continue inpatient   - c/w lipitor 10 at bedtime    3. Hypertension  - chronic, controlled, and stable   - continue with clonidine .1 BID, hydralazine 25 q8, losartan 25 daily, lopressor 75 BID  - continue with care as per primary team 54y old  Male who presents with a chief complaint of r/o osteomyelitis, schedules for L third digit amputation and partial right 2nd digit amputation.     - Patient denies recent MI, arrythmia, CHF or valvular disease   - ECst degree AV block, no ischemic changes   - METS score > 4   - Revised Cardiac risk score: 1   - Patient is low risk for a low risk procedure     Plan:  1. Cardiac Clearance for toe amputation 2/2 to osteomyelitis left 3rd digit and right partial 2nd digit   - patient is optimized for procedure today  - Patient is low risk for low risk procedure   - has no cardiac contraindications  - MET score > 4  - revised cardiac risk score 1     2. History of CVA   - on  at home, continue inpatient   - c/w lipitor 10 at bedtime    3. Hypertension  - chronic, controlled, and stable   - continue with clonidine .1 BID, hydralazine 25 q8, losartan 25 daily, lopressor 75 BID  - continue with care as per primary team 54y old  Male who presents with a chief complaint of r/o osteomyelitis, schedules for L third digit amputation and partial right 2nd digit amputation.     - Patient denies recent MI, arrythmia, CHF or valvular disease   - states that he has a loop recorder post CVA in    - ECst degree AV block, no ischemic changes   - METS score > 4   - Revised Cardiac risk score: 1   - Patient is low risk for a low risk procedure     Plan:  1. Cardiac Clearance for toe amputation 2/2 to osteomyelitis left 3rd digit and right partial 2nd digit   - patient is optimized for procedure in OR today  - Patient is low risk for low risk procedure   - has no cardiac contraindications  - MET score > 4  - revised cardiac risk score 1     2. History of CVA   - on  at home, continue inpatient   - c/w lipitor 10 at bedtime    3. Hypertension  - chronic, controlled, and stable   - continue with clonidine .1 BID, hydralazine 25 q8, losartan 25 daily, lopressor 75 BID  - continue with care as per primary team

## 2020-08-27 NOTE — PROGRESS NOTE ADULT - PROBLEM SELECTOR PLAN 1
pt aware that with bone being exposed if there is not already osteo it will develop. Pt understands role of surgery with nonhealing situation and this is reasonable and warranted in this context. Pending surgery and further micro data recommend:  ceftriaxone 2 grams IV Q-day pending micro data.

## 2020-08-27 NOTE — PROGRESS NOTE ADULT - PROBLEM SELECTOR PLAN 3
- H/O Renal Transplant at [facility]   -BUN/Cr 26/1.40, mild increase from baseline, baseline appears to be Cr 1.1  -gentle IVF  -BMP in AM , avoid nephrotoxic meds  -if Cr worsens, consider stopping Losartan  -nephro Dr. Bryant consulted, recs appreciated - H/O Renal Transplant at [facility]   -if Cr worsens, consider stopping Losartan  -nephro Dr. Bryant consulted, recs appreciated - H/O Renal Transplant at [facility]   -if Cr worsens, consider stopping Losartan  -Cr stable   -nephro Dr. Bryant consulted, recs appreciated - H/O Renal Transplant at [facility]- LDRT  -if Cr worsens, consider stopping Losartan  -Cr stable   -nephro Dr. Bryant follow up, BMP in AM

## 2020-08-27 NOTE — PROGRESS NOTE ADULT - SUBJECTIVE AND OBJECTIVE BOX
Patient is a 54y old  Male who presents with a chief complaint of r/o osteomyelitis (27 Aug 2020 11:10)    Patient seen in follow up for CKD, h/o Kidney transplant.        PAST MEDICAL HISTORY:  History of DVT (deep vein thrombosis)  Squamous cell carcinoma of skin of left ear  CVA (cerebral vascular accident)  Obesity (BMI 30-39.9)  Diabetic peripheral neuropathy  CKD (chronic kidney disease)  Diabetes mellitus  Hyperlipidemia  Hypertension    MEDICATIONS  (STANDING):  aspirin 325 milliGRAM(s) Oral daily  atorvastatin 10 milliGRAM(s) Oral at bedtime  azaTHIOprine 100 milliGRAM(s) Oral daily  cefTRIAXone   IVPB 2000 milliGRAM(s) IV Intermittent every 24 hours  cloNIDine 0.1 milliGRAM(s) Oral every 12 hours  dextrose 5%. 1000 milliLiter(s) (50 mL/Hr) IV Continuous <Continuous>  dextrose 5%. 1000 milliLiter(s) (50 mL/Hr) IV Continuous <Continuous>  dextrose 50% Injectable 12.5 Gram(s) IV Push once  dextrose 50% Injectable 25 Gram(s) IV Push once  dextrose 50% Injectable 25 Gram(s) IV Push once  famotidine    Tablet 20 milliGRAM(s) Oral daily  gabapentin 300 milliGRAM(s) Oral two times a day  hydrALAZINE 25 milliGRAM(s) Oral every 8 hours  insulin lispro (HumaLOG) Pump 1 Each SubCutaneous Continuous Pump  losartan 25 milliGRAM(s) Oral daily  metoprolol tartrate 75 milliGRAM(s) Oral two times a day  tacrolimus 1.5 milliGRAM(s) Oral two times a day  trimethoprim   80 mG/sulfamethoxazole 400 mG 1 Tablet(s) Oral daily    MEDICATIONS  (PRN):  dextrose 40% Gel 15 Gram(s) Oral once PRN Blood Glucose LESS THAN 70 milliGRAM(s)/deciliter  glucagon  Injectable 1 milliGRAM(s) IntraMuscular once PRN Glucose LESS THAN 70 milligrams/deciliter    T(C): 37 (08-27-20 @ 11:55), Max: 37.1 (08-26-20 @ 09:23)  HR: 68 (08-27-20 @ 11:55) (56 - 72)  BP: 154/77 (08-27-20 @ 11:55) (126/77 - 172/80)  RR: 17 (08-27-20 @ 11:55) (16 - 18)  SpO2: 97% (08-27-20 @ 11:55) (94% - 97%)  Wt(kg): --  I&O's Detail      PHYSICAL EXAM:  General: No distress  Respiratory: b/l air entry  Cardiovascular: S1 S2  Gastrointestinal: soft  Extremities: no edema                              13.2   7.02  )-----------( 200      ( 27 Aug 2020 08:47 )             41.0     08-27    141  |  107  |  23  ----------------------------<  95  4.2   |  31  |  1.20    Ca    8.7      27 Aug 2020 08:47    TPro  7.8  /  Alb  3.5  /  TBili  0.6  /  DBili  x   /  AST  16  /  ALT  17  /  AlkPhos  111  08-26        LIVER FUNCTIONS - ( 26 Aug 2020 10:34 )  Alb: 3.5 g/dL / Pro: 7.8 g/dL / ALK PHOS: 111 U/L / ALT: 17 U/L / AST: 16 U/L / GGT: x               Sodium, Serum: 141 (08-27 @ 08:47)  Sodium, Serum: 142 (08-26 @ 10:34)    Creatinine, Serum: 1.20 (08-27 @ 08:47)  Creatinine, Serum: 1.40 (08-26 @ 10:34)    Potassium, Serum: 4.2 (08-27 @ 08:47)  Potassium, Serum: 4.6 (08-26 @ 10:34)    Hemoglobin: 13.2 (08-27 @ 08:47)  Hemoglobin: 13.8 (08-26 @ 10:34)

## 2020-08-27 NOTE — PROGRESS NOTE ADULT - PROBLEM SELECTOR PLAN 5
chronic, on insulin pump  -Pat Weil diabetes educator to see patient  -Endo Dr. Perlman consulted, f/u recs  -follow up A1C, 7.1 in 12/2019  -Nutritional Eval. chronic, on insulin pump  -Pat Weil diabetes educator to see patient  -Endo Dr. Perlman consulted, recs appreciated; pump settings adjusted for temp basal 70% for next 20 hrs, goal bg 100-180 in hosp setting.  -A1C 8.2, 7.1 in 12/2019  -Nutritional Eval. chronic, on insulin pump- Low sugar this AM , IV Fluids.  -Pat Weil diabetes educator to see patient  -Endo Dr. Perlman consulted, recs appreciated; pump settings adjusted for temp basal 70% for next 20 hrs, goal bg 100-180 in hosp setting.  -A1C 8.2, 7.1 in 12/2019  -Nutritional Eval., D/W PAt weil.

## 2020-08-27 NOTE — PROGRESS NOTE ADULT - PROBLEM SELECTOR PLAN 1
sent in by Dr. Araiza wound care to r/o osteomyelitis, ESR mildly elevated to 25  -s/p vanco and zosyn in the ED,   -pt states he is unable to get MRI due to metals 2/2 inner ear surgery   -f/u blood and urine cx  -Podiatry Dr. Araiza consulted, recs appreciated  -ID Dr. Saucedo consulted, f/u recs- as per ID pt aware that with bone being exposed if there is not already osteo it will develop. Pt understands role of surgery with nonhealing situation and this is reasonable and warranted in this context.  -Rocephin 2 gm IV Daily   -cardio Dr. Jansen's group consulted for cardiac clearance for possible procedure  -scheduled for left 3rd digit amputation and right partial 2nd digit amputation on 8/27/20 sent in by Dr. Araiza wound care to r/o osteomyelitis, ESR mildly elevated to 25  -pt states he is unable to get MRI due to metals 2/2 inner ear surgery   -Podiatry Dr. Araiza consulted  -ID Dr. Saucedo consulted, f/u recs- as per ID pt aware that with bone being exposed if there is not already osteo it will develop. Pt understands role of surgery with nonhealing situation and this is reasonable and warranted in this context.  -Rocephin 2 gm IV Daily   -cardio Dr. Jansen's group consulted for cardiac clearance for possible procedure  -scheduled for left 3rd digit amputation and right partial 2nd digit amputation on 8/27/20 sent in by Dr. Araiza wound care to r/o osteomyelitis, ESR mildly elevated to 25  -pt states he is unable to get MRI due to metals 2/2 inner ear surgery   -Podiatry Dr. Araiza consulted  -ID Dr. Saucedo consulted, f/u recs- as per ID pt aware that with bone being exposed if there is not already osteo it will develop. Pt understands role of surgery with nonhealing situation and this is reasonable and warranted in this context.  -Rocephin 2 gm IV Daily   -cardio Dr. Jansen's group consulted for cardiac clearance for procedure  -scheduled for left 3rd digit amputation and right partial 2nd digit amputation today  -patient is medically optimized for scheduled procedure, mod risk patient for mod risk procedure sent in by Dr. Araiza wound care to r/o osteomyelitis, ESR mildly elevated to 25  -pt states he is unable to get MRI due to metals 2/2 inner ear surgery   -Podiatry Dr. Araiza -OR today   -ID Dr. Saucedo consulted, f/u recs- as per ID pt aware that with bone being exposed if there is not already osteo it will develop. Pt understands role of surgery with nonhealing situation and this is reasonable and warranted in this context.  -Rocephin 2 gm IV Daily   -cardio Dr. Jansen's group consulted for cardiac clearance for procedure  -scheduled for left 3rd digit amputation and right partial 2nd digit amputation today  -patient is medically optimized for scheduled procedure, mod risk patient for mod risk procedure

## 2020-08-27 NOTE — PROGRESS NOTE ADULT - PROBLEM SELECTOR PLAN 8
Heparin SubQ for DVT ppx  IMPROVE VTE Individual Risk Assessment    RISK                                                                Points  [  ] Previous VTE                                                  3  [  ] Thrombophilia                                               2  [  ] Lower limb paralysis                                      2        (unable to hold up >15 seconds)    [  ] Current Cancer                                              2         (within 6 months)  [  ] Immobilization > 24 hrs                                1  [  ] ICU/CCU stay > 24 hours                              1  [  ] Age > 60                                                      1  IMPROVE VTE Score: 0    IMPROVE Score 0-1: Low Risk, No VTE prophylaxis required for most patients, encourage ambulation.   IMPROVE Score 2-3: At risk, pharmacologic VTE prophylaxis is indicated for most patients (in the absence of a contraindication)  IMPROVE Score > or = 4: High Risk, pharmacologic VTE prophylaxis is indicated for most patients (in the absence of a contraindication) hold heparin, pt going to OR today   IMPROVE VTE Individual Risk Assessment    RISK                                                                Points  [  ] Previous VTE                                                  3  [  ] Thrombophilia                                               2  [  ] Lower limb paralysis                                      2        (unable to hold up >15 seconds)    [  ] Current Cancer                                              2         (within 6 months)  [  ] Immobilization > 24 hrs                                1  [  ] ICU/CCU stay > 24 hours                              1  [  ] Age > 60                                                      1  IMPROVE VTE Score: 0    IMPROVE Score 0-1: Low Risk, No VTE prophylaxis required for most patients, encourage ambulation.   IMPROVE Score 2-3: At risk, pharmacologic VTE prophylaxis is indicated for most patients (in the absence of a contraindication)  IMPROVE Score > or = 4: High Risk, pharmacologic VTE prophylaxis is indicated for most patients (in the absence of a contraindication)

## 2020-08-27 NOTE — PROGRESS NOTE ADULT - ASSESSMENT
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    1.	CKD 3, h/o Kidney transplant 2013, Living related donor (Baseline 1.2)  2.	Diabetes  3.	Hypertension  4.	Diabetic foot ulcer    Improved renal indices to baseline. Pt on prograf & azathioprine. Monitor blood sugar levels. Insulin coverage as needed. Dietary restriction. Monitor BP trend. Titrate BP meds as needed. Salt restriction. Avoid nephrotoxic meds as possible. Podiatry follow up. For surgery. Will follow electrolytes and renal function trend.

## 2020-08-27 NOTE — PROVIDER CONTACT NOTE (MEDICATION) - RECOMMENDATIONS
Case discussed with Dr Perlman/ Dr Castro  insulin pump changed to 70% post op x 24 hrs  Will continue to monitor BG   Goal 100-180 mg/dL

## 2020-08-27 NOTE — BRIEF OPERATIVE NOTE - OPERATION/FINDINGS
Left foot third digit distal and middle phalanx osteomyelitis and right foot distal second digit osteomyelitis

## 2020-08-27 NOTE — CONSULT NOTE ADULT - CONSULT REASON
CKD, h/o Kidney transplant
cardiac clearance  L 3rd digit amputation, right partial 2nd digit amputation
Bilateral diabetic foot ulcers
dm1 uncontrolled
r/o osteomyelitis

## 2020-08-27 NOTE — PROGRESS NOTE ADULT - SUBJECTIVE AND OBJECTIVE BOX
infectious diseases progress note:    LUTHER NORIEGA is a 54y y. o. Male patient    No concerning overnight events    Allergies    No Known Allergies    Intolerances        ANTIBIOTICS/RELEVANT:  antimicrobials  cefTRIAXone   IVPB 2000 milliGRAM(s) IV Intermittent every 24 hours  trimethoprim   80 mG/sulfamethoxazole 400 mG 1 Tablet(s) Oral daily    immunologic:  azaTHIOprine 100 milliGRAM(s) Oral daily  tacrolimus 1.5 milliGRAM(s) Oral two times a day    OTHER:  aspirin 325 milliGRAM(s) Oral daily  atorvastatin 10 milliGRAM(s) Oral at bedtime  cloNIDine 0.1 milliGRAM(s) Oral every 12 hours  dextrose 40% Gel 15 Gram(s) Oral once PRN  dextrose 5%. 1000 milliLiter(s) IV Continuous <Continuous>  dextrose 5%. 1000 milliLiter(s) IV Continuous <Continuous>  dextrose 50% Injectable 12.5 Gram(s) IV Push once  dextrose 50% Injectable 25 Gram(s) IV Push once  dextrose 50% Injectable 25 Gram(s) IV Push once  famotidine    Tablet 20 milliGRAM(s) Oral daily  gabapentin 300 milliGRAM(s) Oral two times a day  glucagon  Injectable 1 milliGRAM(s) IntraMuscular once PRN  hydrALAZINE 25 milliGRAM(s) Oral every 8 hours  insulin lispro (HumaLOG) Pump 1 Each SubCutaneous Continuous Pump  losartan 25 milliGRAM(s) Oral daily  metoprolol tartrate 75 milliGRAM(s) Oral two times a day      Objective:  Vital Signs Last 24 Hrs  T(C): 36.4 (27 Aug 2020 05:00), Max: 37 (26 Aug 2020 18:45)  T(F): 97.5 (27 Aug 2020 05:00), Max: 98.6 (26 Aug 2020 18:45)  HR: 56 (27 Aug 2020 05:00) (56 - 72)  BP: 131/70 (27 Aug 2020 05:00) (126/77 - 172/80)  BP(mean): --  RR: 17 (27 Aug 2020 05:00) (16 - 17)  SpO2: 94% (27 Aug 2020 05:00) (94% - 97%)    T(C): 36.4 (08-27-20 @ 05:00), Max: 37.1 (08-26-20 @ 09:23)  T(C): 36.4 (08-27-20 @ 05:00), Max: 37.1 (08-26-20 @ 09:23)  T(C): 36.4 (08-27-20 @ 05:00), Max: 37.1 (08-26-20 @ 09:23)    PHYSICAL EXAM:  HEENT: NC atraumatic  Neck: supple  Respiratory: no accessory muscle use, breathing comfortably  Cardiovascular: distant  Gastrointestinal: normal appearing, nondistended  Extremities: no clubbing, no cyanosis,      LABS:                          13.2   7.02  )-----------( 200      ( 27 Aug 2020 08:47 )             41.0       7.02 08-27 @ 08:47  7.44 08-26 @ 10:34      08-27    141  |  107  |  23  ----------------------------<  95  4.2   |  31  |  1.20    Ca    8.7      27 Aug 2020 08:47    TPro  7.8  /  Alb  3.5  /  TBili  0.6  /  DBili  x   /  AST  16  /  ALT  17  /  AlkPhos  111  08-26      Creatinine, Serum: 1.20 mg/dL (08-27-20 @ 08:47)  Creatinine, Serum: 1.40 mg/dL (08-26-20 @ 10:34)                INFLAMMATORY MARKERS  Auto Neutrophil #: 4.67 K/uL (08-27-20 @ 08:47)  Auto Lymphocyte #: 1.24 K/uL (08-27-20 @ 08:47)  Auto Neutrophil #: 5.47 K/uL (08-26-20 @ 10:34)  Auto Lymphocyte #: 1.06 K/uL (08-26-20 @ 10:34)      Auto Eosinophil #: 0.40 K/uL (08-27-20 @ 08:47)  Auto Eosinophil #: 0.32 K/uL (08-26-20 @ 10:34)      Sedimentation Rate, Erythrocyte: 25 mm/hr (08-26-20 @ 10:34)                            MICROBIOLOGY:              RADIOLOGY & ADDITIONAL STUDIES:

## 2020-08-27 NOTE — PROVIDER CONTACT NOTE (MEDICATION) - ACTION/TREATMENT ORDERED:
case discussed with Dr. Castro  Dextrose 5% at 50/hr started  Will inform endo and see if decrease temp basal indicated  recheck in 3 hours  Goal >100 mg/dL

## 2020-08-27 NOTE — BRIEF OPERATIVE NOTE - NSICDXBRIEFPOSTOP_GEN_ALL_CORE_FT
POST-OP DIAGNOSIS:  Osteomyelitis of second toe of right foot 27-Aug-2020 14:41:30  Becki Campbell  Osteomyelitis of third toe of left foot 27-Aug-2020 14:41:18  Becki Campbell

## 2020-08-27 NOTE — BRIEF OPERATIVE NOTE - NSICDXBRIEFPREOP_GEN_ALL_CORE_FT
PRE-OP DIAGNOSIS:  Osteomyelitis of second toe of right foot 27-Aug-2020 14:40:37  Becki Campbell  Osteomyelitis of third toe of left foot 27-Aug-2020 14:40:21  Becki Campbell

## 2020-08-27 NOTE — BRIEF OPERATIVE NOTE - COMMENTS
Patient tolerated the procedure and anesthesia well and without any complications. Patient was transported to PACU with vitals signs stable and neurovascular status intact to bilateral foot.

## 2020-08-27 NOTE — PROGRESS NOTE ADULT - ASSESSMENT
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    8/27-scheduled surgery

## 2020-08-27 NOTE — PROVIDER CONTACT NOTE (MEDICATION) - ACTION/TREATMENT ORDERED:
case discussed with Dr. Perlman  decrease temp basal to 30% now  dextrose 5% increased to 100/hr  awaiting OR  Goal 100-180 mg/dL case discussed with Dr. Perlman  11am decrease temp basal to 30% now  dextrose 5% increased to 100/hr  recheck BG approx 90 mins  awaiting OR/ anesthesia and periop team aware  Goal 100-180 mg/dL

## 2020-08-27 NOTE — BRIEF OPERATIVE NOTE - SPECIMENS
1)Distal right 2nd toe pathology and 2) bone culture 3) distal right middle phalanx pathology 4) distal left 3rd toe pathology and 5) bone culture 6) distal left 3rd toe middle phalanx pathology 7) Distal left 3rd toe proximal phalanx head pathology

## 2020-08-27 NOTE — PROGRESS NOTE ADULT - PROBLEM SELECTOR PLAN 4
s/p right renal transplant for CKD (2013)  -continue home azathioprine and tacrolimus,  -Renal Dr Bryant , BMP in AM s/p right renal transplant for CKD (2013)  -continue home azathioprine and tacrolimus, bactrim   -Renal Dr Bryant  -continue to monitor BMP

## 2020-08-27 NOTE — PROGRESS NOTE ADULT - PROBLEM SELECTOR PLAN 2
[place] CVA (2015) - residual deficits of loss of pain and temperature sensation in left arm and right face  -continue home aspirin 325 mg   -continue home gabapentin for residual neuropathic facial pain [place] CVA (2015) - residual deficits of loss of pain and temperature -Chronic, stable, sensation in left arm and right face  -continue home aspirin 325 mg   -continue home gabapentin for residual neuropathic facial pain

## 2020-08-27 NOTE — PROGRESS NOTE ADULT - SUBJECTIVE AND OBJECTIVE BOX
Patient is a 54y old  Male who presents with a chief complaint of diabetic ulcers, admitted to r/o osteomyelitis    INTERVAL HPI:  8/27/20: Patient seen and examined at bedside. No overnight events.     OVERNIGHT EVENTS: none    Home Medications:  aspirin 325 mg oral tablet: 1 tab(s) orally once a day (26 Aug 2020 12:06)  Cozaar 25 mg oral tablet: 1 tab(s) orally once a day (26 Aug 2020 12:06)  famotidine 20 mg oral tablet: 1 tab(s) orally once a day (26 Aug 2020 12:06)  gabapentin 300 mg oral capsule: 1 cap(s) orally 2 times a day (26 Aug 2020 12:06)  lovastatin 40 mg oral tablet: 1 tab(s) orally once a day (26 Aug 2020 12:06)  Metoprolol Tartrate 25 mg oral tablet: 3 tab(s) orally 2 times a day    *conf. with patient and pharmacy. 75mg dose, BID* (26 Aug 2020 12:06)  tacrolimus 0.5 mg oral capsule: 3 cap(s) orally 2 times a day (26 Aug 2020 12:06)      MEDICATIONS  (STANDING):  aspirin 325 milliGRAM(s) Oral daily  atorvastatin 10 milliGRAM(s) Oral at bedtime  azaTHIOprine 100 milliGRAM(s) Oral daily  cefTRIAXone   IVPB 2000 milliGRAM(s) IV Intermittent every 24 hours  cloNIDine 0.1 milliGRAM(s) Oral every 12 hours  dextrose 5%. 1000 milliLiter(s) (50 mL/Hr) IV Continuous <Continuous>  dextrose 5%. 1000 milliLiter(s) (50 mL/Hr) IV Continuous <Continuous>  dextrose 50% Injectable 12.5 Gram(s) IV Push once  dextrose 50% Injectable 25 Gram(s) IV Push once  dextrose 50% Injectable 25 Gram(s) IV Push once  famotidine    Tablet 20 milliGRAM(s) Oral daily  gabapentin 300 milliGRAM(s) Oral two times a day  hydrALAZINE 25 milliGRAM(s) Oral every 8 hours  insulin lispro (HumaLOG) Pump 1 Each SubCutaneous Continuous Pump  losartan 25 milliGRAM(s) Oral daily  metoprolol tartrate 75 milliGRAM(s) Oral two times a day  tacrolimus 1.5 milliGRAM(s) Oral two times a day  trimethoprim   80 mG/sulfamethoxazole 400 mG 1 Tablet(s) Oral daily    MEDICATIONS  (PRN):  dextrose 40% Gel 15 Gram(s) Oral once PRN Blood Glucose LESS THAN 70 milliGRAM(s)/deciliter  glucagon  Injectable 1 milliGRAM(s) IntraMuscular once PRN Glucose LESS THAN 70 milligrams/deciliter      No Known Allergies      Social History:  Never a smoker, drinks ETOH once every few weeks, denies any illicit drugs  independent in ADLs, walks without assistance, lives at home with wife (26 Aug 2020 12:52)      REVIEW OF SYSTEMS:  CONSTITUTIONAL: No fever, No chills, No fatigue, No myalgia, No Body ache, No Weakness  EYES: No eye pain,  No visual disturbances, No discharge, No Redness  ENMT: No ear pain, No nose bleed, No vertigo; No sinus pain, No throat pain, No Congestion  NECK: No pain, No stiffness  RESPIRATORY: No cough, No wheezing, No hemoptysis, No shortness of breath  CARDIOVASCULAR: No chest pain, No palpitations  GASTROINTESTINAL: No abdominal pain, No epigastric pain. No nausea, No vomiting, No diarrhea, No constipation; [  ] BM  GENITOURINARY: No dysuria, No frequency, No urgency, No hematuria, No incontinence  NEUROLOGICAL: No headaches, No dizziness, No numbness, No tingling, No tremors, No weakness  EXTREMITIES: No Swelling, No Pain, No Edema  SKIN: [  ] No itching, burning, rashes, or lesions   MUSCULOSKELETAL: No joint pain, No joint swelling; No muscle pain, No back pain, No extremity pain  PSYCHIATRIC: No depression, No anxiety, No mood swings, No difficulty sleeping at night  PAIN SCALE: [  ] None  [  ] Other-  ROS Unable to obtain due to: [  ] Dementia  [  ] Lethargy  [  ] Sedated  [  ] Non verbal  REST OF REVIEW OF SYSTEMS: [  ] Normal     Vital Signs Last 24 Hrs  T(C): 36.4 (27 Aug 2020 05:00), Max: 37.1 (26 Aug 2020 09:23)  T(F): 97.5 (27 Aug 2020 05:00), Max: 98.8 (26 Aug 2020 09:23)  HR: 56 (27 Aug 2020 05:00) (56 - 72)  BP: 131/70 (27 Aug 2020 05:00) (126/77 - 172/80)  BP(mean): --  RR: 17 (27 Aug 2020 05:00) (16 - 18)  SpO2: 94% (27 Aug 2020 05:00) (94% - 97%)      Finger Stick      I&O's Summary    PHYSICAL EXAM:  GENERAL:  [  ] NAD, [  ] Well appearing, [  ] Agitated, [  ] Drowsy, [  ] Lethargy, [  ] Confused   HEAD:  [  ] Normal, [  ] Other  EYES:  [  ] EOMI, [  ] PERRLA, [  ] Conjunctiva and sclera clear normal, [  ] Other, [  ] Pallor, [  ] Discharge  ENMT:  [  ] Normal, [  ] Moist mucous membranes, [  ] Good dentition, [  ] No thrush  NECK:  [  ] Supple, [  ] No JVD, [  ] Normal thyroid, [  ] Lymphadenopathy, [  ] Other  CHEST/LUNG:  [  ] Clear to auscultation bilaterally, [  ] Breath Sounds equal B/L / decreased, [  ] Poor effort, [  ] No rales, [  ] No rhonchi, [  ] No wheezing  HEART:  [  ] Regular rate and rhythm, [  ] Tachycardia, [  ] Bradycardia, [  ] Irregular, [  ] No murmurs, No rubs, No gallops, [  ] PPM in place (Mfr:  )  ABDOMEN:  [  ] Soft, [  ] Nontender, [  ] Nondistended, [  ] No mass, [  ] Bowel sounds present, [  ] Obese  NERVOUS SYSTEM:  [  ] Alert & Oriented x3, [  ] Nonfocal, [  ] Confusion, [  ] Encephalopathic, [  ] Sedated, [  ] Unable to assess, [  ] Dementia, [  ] Other-  EXTREMITIES:  [  ] 2+ Peripheral Pulses, No clubbing, No cyanosis,  [  ] Edema B/L lower EXT, [  ] PVD stasis skin changes B/L lower EXT, [  ] Wound  LYMPH:  No lymphadenopathy noted  SKIN:  [  ] No rashes or lesions, [  ] Pressure ulcers, [  ] Ecchymosis, [  ] Skin tears, [  ] Other    DIET: Diet, Regular:   Consistent Carbohydrate No Snacks  DASH/TLC Sodium & Cholesterol Restricted  No Concentrated Potassium  No Concentrated Phosphorus (08-26-20 @ 12:12)  Diet, NPO after Midnight:      NPO Start Date: 26-Aug-2020,   NPO Start Time: 23:59  Except Medications (08-27-20 @ 04:47)      LABS:                        13.8   7.44  )-----------( 231      ( 26 Aug 2020 10:34 )             42.2     26 Aug 2020 10:34    142    |  107    |  26     ----------------------------<  113    4.6     |  32     |  1.40     Ca    9.1        26 Aug 2020 10:34    TPro  7.8    /  Alb  3.5    /  TBili  0.6    /  DBili  x      /  AST  16     /  ALT  17     /  AlkPhos  111    26 Aug 2020 10:34                   Anemia Panel:      Thyroid Panel:                RADIOLOGY & ADDITIONAL TESTS:      HEALTH ISSUES - PROBLEM Dx:  Type 1 diabetes mellitus with stage 5 chronic kidney disease not on chronic dialysis: Type 1 diabetes mellitus with stage 5 chronic kidney disease not on chronic dialysis  Osteomyelitis: Osteomyelitis  Need for prophylactic measure: Need for prophylactic measure  Hypertension: Hypertension  Hyperlipidemia: Hyperlipidemia  Diabetes mellitus: Diabetes mellitus  Renal transplant recipient: Renal transplant recipient  CKD (chronic kidney disease): CKD (chronic kidney disease)  Diabetic peripheral neuropathy: Diabetic peripheral neuropathy  CVA (cerebral vascular accident): CVA (cerebral vascular accident)  Diabetic ulcer of toe: Diabetic ulcer of toe  Diabetic ulcer of toe of left foot: Diabetic ulcer of toe of left foot        Consultant(s) Notes Reviewed:  [  ] YES     Care Discussed with [ x ] Consultants, [  ] Patient, [  ] Family, [  ] HCP, [  ] , [  ] Social Service, [  ] RN, [  ] Physical Therapy, [  ] Palliative Care Team  DVT PPX: [  ] Lovenox, [  ] SC Heparin, [  ] Coumadin, [  ] Xarelto, [  ] Eliquis, [  ] Pradaxa, [  ] IV Heparin drip, [  ] SCD, [  ] Ambulation, [  ] Contraindicated 2/2 GI Bleed, [  ] Contraindicated 2/2  Bleed, [  ] Contraindicated 2/2 Brain Bleed  Advanced Directive: [  ] None, [  ] DNR/DNI Patient is a 54y old  Male who presents with a chief complaint of diabetic ulcers, admitted to r/o osteomyelitis    INTERVAL HPI:  8/27/20: Patient seen and examined at bedside. No acute overnight events. Patient denies any foot pain, numbness, extremity tingling, changes in vision, chest pain, SOB, palpitations, nausea, vomiting, hematuria, urinary incontinence, or blood in stools. Patient says he was able to sleep well overnight as well as ambulate to use the bathroom. Pt is currently awaiting surgery for the amputation of L 3rd digit and R 2nd digit.       OVERNIGHT EVENTS: none    Home Medications:  aspirin 325 mg oral tablet: 1 tab(s) orally once a day (26 Aug 2020 12:06)  Cozaar 25 mg oral tablet: 1 tab(s) orally once a day (26 Aug 2020 12:06)  famotidine 20 mg oral tablet: 1 tab(s) orally once a day (26 Aug 2020 12:06)  gabapentin 300 mg oral capsule: 1 cap(s) orally 2 times a day (26 Aug 2020 12:06)  lovastatin 40 mg oral tablet: 1 tab(s) orally once a day (26 Aug 2020 12:06)  Metoprolol Tartrate 25 mg oral tablet: 3 tab(s) orally 2 times a day    *conf. with patient and pharmacy. 75mg dose, BID* (26 Aug 2020 12:06)  tacrolimus 0.5 mg oral capsule: 3 cap(s) orally 2 times a day (26 Aug 2020 12:06)      MEDICATIONS  (STANDING):  aspirin 325 milliGRAM(s) Oral daily  atorvastatin 10 milliGRAM(s) Oral at bedtime  azaTHIOprine 100 milliGRAM(s) Oral daily  cefTRIAXone   IVPB 2000 milliGRAM(s) IV Intermittent every 24 hours  cloNIDine 0.1 milliGRAM(s) Oral every 12 hours  dextrose 5%. 1000 milliLiter(s) (50 mL/Hr) IV Continuous <Continuous>  dextrose 5%. 1000 milliLiter(s) (50 mL/Hr) IV Continuous <Continuous>  dextrose 50% Injectable 12.5 Gram(s) IV Push once  dextrose 50% Injectable 25 Gram(s) IV Push once  dextrose 50% Injectable 25 Gram(s) IV Push once  famotidine    Tablet 20 milliGRAM(s) Oral daily  gabapentin 300 milliGRAM(s) Oral two times a day  hydrALAZINE 25 milliGRAM(s) Oral every 8 hours  insulin lispro (HumaLOG) Pump 1 Each SubCutaneous Continuous Pump  losartan 25 milliGRAM(s) Oral daily  metoprolol tartrate 75 milliGRAM(s) Oral two times a day  tacrolimus 1.5 milliGRAM(s) Oral two times a day  trimethoprim   80 mG/sulfamethoxazole 400 mG 1 Tablet(s) Oral daily    MEDICATIONS  (PRN):  dextrose 40% Gel 15 Gram(s) Oral once PRN Blood Glucose LESS THAN 70 milliGRAM(s)/deciliter  glucagon  Injectable 1 milliGRAM(s) IntraMuscular once PRN Glucose LESS THAN 70 milligrams/deciliter      No Known Allergies      Social History:  Never a smoker, drinks ETOH once every few weeks, denies any illicit drugs  independent in ADLs, walks without assistance, lives at home with wife (26 Aug 2020 12:52)      REVIEW OF SYSTEMS:  CONSTITUTIONAL: Denies fever, chills, fatigue  EYES: No visual disturbances  RESPIRATORY: No cough, No wheezing, No shortness of breath  CARDIOVASCULAR: No chest pain, No palpitations  GASTROINTESTINAL: No abdominal pain No nausea, No vomiting, No diarrhea, No blood in stool; [ 1 ] BM  GENITOURINARY: No dysuria, No hematuria, No incontinence  NEUROLOGICAL: No headaches, No dizziness, No numbness, No tingling, No tremors, No weakness  EXTREMITIES: Wrapping around the L 3rd digit as well R 2nd digit; amputation of L 2nd digit. +1 R leg swelling, denies Pain or Edema  SKIN: Lesion on R lower extremity   MUSCULOSKELETAL: No joint pain, No joint swelling; No muscle pain, No back pain, No extremity pain  PSYCHIATRIC: No difficulty sleeping at night  PAIN SCALE: [ x ] None  [  ] Other-  REST OF REVIEW OF SYSTEMS: [  ] Normal     Vital Signs Last 24 Hrs  T(C): 36.4 (27 Aug 2020 05:00), Max: 37.1 (26 Aug 2020 09:23)  T(F): 97.5 (27 Aug 2020 05:00), Max: 98.8 (26 Aug 2020 09:23)  HR: 56 (27 Aug 2020 05:00) (56 - 72)  BP: 131/70 (27 Aug 2020 05:00) (126/77 - 172/80)  BP(mean): --  RR: 17 (27 Aug 2020 05:00) (16 - 18)  SpO2: 94% (27 Aug 2020 05:00) (94% - 97%)      Finger Stick      I&O's Summary    PHYSICAL EXAM:  GENERAL:  [  ] NAD, [ x ] Well appearing, [  ] Agitated, [  ] Drowsy, [  ] Lethargy, [  ] Confused   HEAD:  [  ] Normal, [  ] Other  EYES:  [  ] EOMI, [  ] PERRLA, [  ] Conjunctiva and sclera clear normal, [  ] Other, [  ] Pallor, [  ] Discharge  ENMT:  [  ] Normal, [  ] Moist mucous membranes, [  ] Good dentition, [  ] No thrush  NECK:  [  ] Supple, [  ] No JVD, [  ] Normal thyroid, [  ] Lymphadenopathy, [  ] Other  CHEST/LUNG:  [ x ] Clear to auscultation bilaterally, [ x ] Breath Sounds equal B/L / decreased, [  ] Poor effort, [ x ] No rales, [ x ] No rhonchi, [x  ] No wheezing  HEART:  [ x ] Regular rate and rhythm, [  ] Tachycardia, [ x ] Bradycardia, [  ] Irregular, [ x ] No murmurs, No rubs, No gallops, [  ] PPM in place (Mfr:  )  ABDOMEN:  [ x ] Soft, [ x ] Nontender, [x  ] Nondistended, [x  ] No mass, [ + ] Bowel sounds present, [+  ] Obese  NERVOUS SYSTEM:  [ x ] Alert & Oriented x3, [  ] Nonfocal, [  ] Confusion, [  ] Encephalopathic, [  ] Sedated, [  ] Unable to assess, [  ] Dementia, [  ] Other-  EXTREMITIES:  [ x ] 2+ Peripheral Pulses, No clubbing, No cyanosis,  [ x ] Edema B/L lower EXT, [ x ] PVD stasis skin changes B/L lower EXT, [ x ] Wound  LYMPH:  No lymphadenopathy noted  SKIN:  Lesion on R lower extremity, [ x ] Pressure ulcers, [ x ] Ecchymosis, [ x ] Skin tears, [x  ] Other    DIET: Diet, Regular:   Consistent Carbohydrate No Snacks  DASH/TLC Sodium & Cholesterol Restricted  No Concentrated Potassium  No Concentrated Phosphorus (08-26-20 @ 12:12)  Diet, NPO after Midnight:      NPO Start Date: 26-Aug-2020,   NPO Start Time: 23:59  Except Medications (08-27-20 @ 04:47)      LABS:                        13.8   7.44  )-----------( 231      ( 26 Aug 2020 10:34 )             42.2     26 Aug 2020 10:34    142    |  107    |  26     ----------------------------<  113    4.6     |  32     |  1.40     Ca    9.1        26 Aug 2020 10:34    TPro  7.8    /  Alb  3.5    /  TBili  0.6    /  DBili  x      /  AST  16     /  ALT  17     /  AlkPhos  111    26 Aug 2020 10:34                   Anemia Panel:      Thyroid Panel:                RADIOLOGY & ADDITIONAL TESTS:      HEALTH ISSUES - PROBLEM Dx:  Type 1 diabetes mellitus with stage 5 chronic kidney disease not on chronic dialysis: Type 1 diabetes mellitus with stage 5 chronic kidney disease not on chronic dialysis  Osteomyelitis: Osteomyelitis  Need for prophylactic measure: Need for prophylactic measure  Hypertension: Hypertension  Hyperlipidemia: Hyperlipidemia  Diabetes mellitus: Diabetes mellitus  Renal transplant recipient: Renal transplant recipient  CKD (chronic kidney disease): CKD (chronic kidney disease)  Diabetic peripheral neuropathy: Diabetic peripheral neuropathy  CVA (cerebral vascular accident): CVA (cerebral vascular accident)  Diabetic ulcer of toe: Diabetic ulcer of toe  Diabetic ulcer of toe of left foot: Diabetic ulcer of toe of left foot        Consultant(s) Notes Reviewed:  [  ] YES     Care Discussed with [ x ] Consultants, [  ] Patient, [  ] Family, [  ] HCP, [  ] , [  ] Social Service, [  ] RN, [  ] Physical Therapy, [  ] Palliative Care Team  DVT PPX: [  ] Lovenox, [  ] SC Heparin, [  ] Coumadin, [  ] Xarelto, [  ] Eliquis, [  ] Pradaxa, [  ] IV Heparin drip, [  ] SCD, [  ] Ambulation, [  ] Contraindicated 2/2 GI Bleed, [  ] Contraindicated 2/2  Bleed, [  ] Contraindicated 2/2 Brain Bleed  Advanced Directive: [  ] None, [  ] DNR/DNI Patient is a 54y old  Male who presents with a chief complaint of diabetic ulcers, admitted to r/o osteomyelitis    INTERVAL HPI:  8/27/20: Patient seen and examined at bedside. No acute overnight events. Patient denies any foot pain, extremity tingling, changes in vision, chest pain, SOB, palpitations, nausea, vomiting, hematuria, urinary incontinence, or blood in stools. Patient says he was able to sleep well overnight as well as ambulate to use the bathroom. Pt is currently awaiting surgery for the amputation of L 3rd digit and R 2nd digit.       OVERNIGHT EVENTS: none    Home Medications:  aspirin 325 mg oral tablet: 1 tab(s) orally once a day (26 Aug 2020 12:06)  Cozaar 25 mg oral tablet: 1 tab(s) orally once a day (26 Aug 2020 12:06)  famotidine 20 mg oral tablet: 1 tab(s) orally once a day (26 Aug 2020 12:06)  gabapentin 300 mg oral capsule: 1 cap(s) orally 2 times a day (26 Aug 2020 12:06)  lovastatin 40 mg oral tablet: 1 tab(s) orally once a day (26 Aug 2020 12:06)  Metoprolol Tartrate 25 mg oral tablet: 3 tab(s) orally 2 times a day    *conf. with patient and pharmacy. 75mg dose, BID* (26 Aug 2020 12:06)  tacrolimus 0.5 mg oral capsule: 3 cap(s) orally 2 times a day (26 Aug 2020 12:06)      MEDICATIONS  (STANDING):  aspirin 325 milliGRAM(s) Oral daily  atorvastatin 10 milliGRAM(s) Oral at bedtime  azaTHIOprine 100 milliGRAM(s) Oral daily  cefTRIAXone   IVPB 2000 milliGRAM(s) IV Intermittent every 24 hours  cloNIDine 0.1 milliGRAM(s) Oral every 12 hours  dextrose 5%. 1000 milliLiter(s) (50 mL/Hr) IV Continuous <Continuous>  dextrose 5%. 1000 milliLiter(s) (50 mL/Hr) IV Continuous <Continuous>  dextrose 50% Injectable 12.5 Gram(s) IV Push once  dextrose 50% Injectable 25 Gram(s) IV Push once  dextrose 50% Injectable 25 Gram(s) IV Push once  famotidine    Tablet 20 milliGRAM(s) Oral daily  gabapentin 300 milliGRAM(s) Oral two times a day  hydrALAZINE 25 milliGRAM(s) Oral every 8 hours  insulin lispro (HumaLOG) Pump 1 Each SubCutaneous Continuous Pump  losartan 25 milliGRAM(s) Oral daily  metoprolol tartrate 75 milliGRAM(s) Oral two times a day  tacrolimus 1.5 milliGRAM(s) Oral two times a day  trimethoprim   80 mG/sulfamethoxazole 400 mG 1 Tablet(s) Oral daily    MEDICATIONS  (PRN):  dextrose 40% Gel 15 Gram(s) Oral once PRN Blood Glucose LESS THAN 70 milliGRAM(s)/deciliter  glucagon  Injectable 1 milliGRAM(s) IntraMuscular once PRN Glucose LESS THAN 70 milligrams/deciliter      No Known Allergies      Social History:  Never a smoker, drinks ETOH once every few weeks, denies any illicit drugs  independent in ADLs, walks without assistance, lives at home with wife (26 Aug 2020 12:52)      REVIEW OF SYSTEMS:  CONSTITUTIONAL: Denies fever, chills, fatigue  EYES: No visual disturbances  RESPIRATORY: No cough, No wheezing, No shortness of breath  CARDIOVASCULAR: No chest pain, No palpitations  GASTROINTESTINAL: No abdominal pain No nausea, No vomiting, No diarrhea, No blood in stool; [ x] BM  GENITOURINARY: No dysuria, No hematuria, No incontinence  NEUROLOGICAL: +decreased sensation in left extremities, No headaches, No dizziness, No tingling, No tremors, No weakness  EXTREMITIES: +swelling, denies Pain  SKIN: + ulcers in both legs, +dry skin, +redness  MUSCULOSKELETAL: No joint pain, No joint swelling; No muscle pain, No back pain, No extremity pain  PSYCHIATRIC: No difficulty sleeping at night  PAIN SCALE: [ x ] None  [  ] Other-  REST OF REVIEW OF SYSTEMS: [x  ] Normal     Vital Signs Last 24 Hrs  T(C): 36.4 (27 Aug 2020 05:00), Max: 37.1 (26 Aug 2020 09:23)  T(F): 97.5 (27 Aug 2020 05:00), Max: 98.8 (26 Aug 2020 09:23)  HR: 56 (27 Aug 2020 05:00) (56 - 72)  BP: 131/70 (27 Aug 2020 05:00) (126/77 - 172/80)  BP(mean): --  RR: 17 (27 Aug 2020 05:00) (16 - 18)  SpO2: 94% (27 Aug 2020 05:00) (94% - 97%)      Finger Stick      I&O's Summary    PHYSICAL EXAM:  GENERAL:  [x  ] NAD, [ x ] Well appearing, [  ] Agitated, [  ] Drowsy, [  ] Lethargy, [  ] Confused   HEAD:  [x  ] Normal, [  ] Other  EYES:  [x  ] EOMI, [x  ] PERRLA, [x  ] Conjunctiva and sclera clear normal, [  ] Other, [  ] Pallor, [  ] Discharge  ENMT:  [ x ] Normal, [x  ] Moist mucous membranes, [x  ] Good dentition, [ x ] No thrush  NECK:  [ x ] Supple, [x  ] No JVD, [ x ] Normal thyroid, [  ] Lymphadenopathy, [  ] Other  CHEST/LUNG:  [ x ] Clear to auscultation bilaterally, [ x ] Breath Sounds equal B/L / decreased, [  ] Poor effort, [ x ] No rales, [ x ] No rhonchi, [x  ] No wheezing  HEART:  [ x ] Regular rate and rhythm, [  ] Tachycardia, [ x ] Bradycardia, [  ] Irregular, [ x ] No murmurs, No rubs, No gallops, [  ] PPM in place (Mfr:  )  ABDOMEN:  [ x ] Soft, [ x ] Nontender, [x  ] Nondistended, [x  ] No mass, [ + ] Bowel sounds present, [+  ] Obese  NERVOUS SYSTEM:  [ x ] Alert & Oriented x3, [  ] Nonfocal, [  ] Confusion, [  ] Encephalopathic, [  ] Sedated, [  ] Unable to assess, [  ] Dementia, [  ] Other-  EXTREMITIES:  [ x ] 2+ Peripheral Pulses, No clubbing, No cyanosis,  [ x ] +2 Edema B/L lower EXT R>L, [ x ] PVD stasis skin changes B/L lower EXT, [ x ] Wound,  dressing around the L 3rd digit and R 2nd digit; partial amputation of L 2nd digit  LYMPH:  No lymphadenopathy noted  SKIN:  Lesion on R lower extremity, [ x ] Pressure ulcers, [ x ] Ecchymosis, [ x ] Skin tears, [x  ] Other    DIET: Diet, Regular:   Consistent Carbohydrate No Snacks  DASH/TLC Sodium & Cholesterol Restricted  No Concentrated Potassium  No Concentrated Phosphorus (08-26-20 @ 12:12)  Diet, NPO after Midnight:      NPO Start Date: 26-Aug-2020,   NPO Start Time: 23:59  Except Medications (08-27-20 @ 04:47)      LABS:  cret                        13.2   7.02  )-----------( 200      ( 27 Aug 2020 08:47 )             41.0     08-27    141  |  107  |  23  ----------------------------<  95  4.2   |  31  |  1.20    Ca    8.7      27 Aug 2020 08:47    TPro  7.8  /  Alb  3.5  /  TBili  0.6  /  DBili  x   /  AST  16  /  ALT  17  /  AlkPhos  111  08-26       Anemia Panel:      Thyroid Panel:      RADIOLOGY & ADDITIONAL TESTS:      HEALTH ISSUES - PROBLEM Dx:  Type 1 diabetes mellitus with stage 5 chronic kidney disease not on chronic dialysis: Type 1 diabetes mellitus with stage 5 chronic kidney disease not on chronic dialysis  Osteomyelitis: Osteomyelitis  Need for prophylactic measure: Need for prophylactic measure  Hypertension: Hypertension  Hyperlipidemia: Hyperlipidemia  Diabetes mellitus: Diabetes mellitus  Renal transplant recipient: Renal transplant recipient  CKD (chronic kidney disease): CKD (chronic kidney disease)  Diabetic peripheral neuropathy: Diabetic peripheral neuropathy  CVA (cerebral vascular accident): CVA (cerebral vascular accident)  Diabetic ulcer of toe: Diabetic ulcer of toe  Diabetic ulcer of toe of left foot: Diabetic ulcer of toe of left foot        Consultant(s) Notes Reviewed:  [  ] YES     Care Discussed with [ x ] Consultants, [ x ] Patient, [  ] Family, [  ] HCP, [  ] , [  ] Social Service, [  ] RN, [  ] Physical Therapy, [  ] Palliative Care Team  DVT PPX: [  ] Lovenox, [  ] SC Heparin, [  ] Coumadin, [  ] Xarelto, [  ] Eliquis, [  ] Pradaxa, [  ] IV Heparin drip, [  ] SCD, [  ] Ambulation, [  ] Contraindicated 2/2 GI Bleed, [  ] Contraindicated 2/2  Bleed, [  ] Contraindicated 2/2 Brain Bleed  Advanced Directive: [  ] None, [  ] DNR/DNI Patient is a 54y old  Male who presents with a chief complaint of diabetic ulcers, admitted to r/o osteomyelitis  53 yo M with PMHx of LDRT / right renal transplant for CKD (2013), T1DM on insulin pump, HTN, HLD, [place] CVA (2015) - residual deficits of loss of pain and temperature sensation in left arm and right face, hx of DVT, 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 Noticed the wounds to the distal L 3rd digit about 2 months ago. Patient has been applying collagenase but showed no improvement. Has been seeing a podiatrist outpatient but wounds are not healing so was sent to wound care for additional management. Sent in by Dr. Araiza today to r/o osteomyelitis. & Possible surgery/amputation of left foot 3rd toe distal digit & Rt foot 2nd toe distal digit . Patient also has a burn rash on his chest that he acquired while cooking with oil 3 weeks ago. Scheduled for MRI of left foot but patient states he is unable to have MRI due to having metal coils in his left inner ear. Denies fevers, chills, SOB, chest pain, pain in distal digital wounds.    In the ED, VS T98.8F HR 64 /71 RR 18 SpO2 94% on RA. Labs significant for mildly elevated ESR 25, normal WBC 7.44, BUN/Cr 26/1.40,   Received Vanco and Zosyn x1 in ED.  CXR showing mildly elevated R hemidiaphragm. No focal consolidation.  EKG showing 1st degree AV block  -Pt seen by ID Dr Saucedo, On IV Abx     INTERVAL HPI:  8/27/20: Patient seen and examined at bedside. No acute overnight events. Patient denies any foot pain, extremity tingling, changes in vision, chest pain, SOB, palpitations, nausea, vomiting, hematuria, urinary incontinence, or blood in stools. Patient says he was able to sleep well overnight as well as ambulate to use the bathroom. Pt is currently awaiting surgery for the amputation of L 3rd digit and R 2nd digit. On IV Abx     OVERNIGHT EVENTS: none    Home Medications:  aspirin 325 mg oral tablet: 1 tab(s) orally once a day (26 Aug 2020 12:06)  Cozaar 25 mg oral tablet: 1 tab(s) orally once a day (26 Aug 2020 12:06)  famotidine 20 mg oral tablet: 1 tab(s) orally once a day (26 Aug 2020 12:06)  gabapentin 300 mg oral capsule: 1 cap(s) orally 2 times a day (26 Aug 2020 12:06)  lovastatin 40 mg oral tablet: 1 tab(s) orally once a day (26 Aug 2020 12:06)  Metoprolol Tartrate 25 mg oral tablet: 3 tab(s) orally 2 times a day    *conf. with patient and pharmacy. 75mg dose, BID* (26 Aug 2020 12:06)  tacrolimus 0.5 mg oral capsule: 3 cap(s) orally 2 times a day (26 Aug 2020 12:06)      MEDICATIONS  (STANDING):  aspirin 325 milliGRAM(s) Oral daily  atorvastatin 10 milliGRAM(s) Oral at bedtime  azaTHIOprine 100 milliGRAM(s) Oral daily  cefTRIAXone   IVPB 2000 milliGRAM(s) IV Intermittent every 24 hours  cloNIDine 0.1 milliGRAM(s) Oral every 12 hours  dextrose 5%. 1000 milliLiter(s) (50 mL/Hr) IV Continuous <Continuous>  dextrose 5%. 1000 milliLiter(s) (50 mL/Hr) IV Continuous <Continuous>  dextrose 50% Injectable 12.5 Gram(s) IV Push once  dextrose 50% Injectable 25 Gram(s) IV Push once  dextrose 50% Injectable 25 Gram(s) IV Push once  famotidine    Tablet 20 milliGRAM(s) Oral daily  gabapentin 300 milliGRAM(s) Oral two times a day  hydrALAZINE 25 milliGRAM(s) Oral every 8 hours  insulin lispro (HumaLOG) Pump 1 Each SubCutaneous Continuous Pump  losartan 25 milliGRAM(s) Oral daily  metoprolol tartrate 75 milliGRAM(s) Oral two times a day  tacrolimus 1.5 milliGRAM(s) Oral two times a day  trimethoprim   80 mG/sulfamethoxazole 400 mG 1 Tablet(s) Oral daily    MEDICATIONS  (PRN):  dextrose 40% Gel 15 Gram(s) Oral once PRN Blood Glucose LESS THAN 70 milliGRAM(s)/deciliter  glucagon  Injectable 1 milliGRAM(s) IntraMuscular once PRN Glucose LESS THAN 70 milligrams/deciliter      No Known Allergies      Social History:  Never a smoker, drinks ETOH once every few weeks, denies any illicit drugs  independent in ADLs, walks without assistance, lives at home with wife (26 Aug 2020 12:52)      REVIEW OF SYSTEMS:  CONSTITUTIONAL: Denies fever, chills, fatigue  EYES: No visual disturbances  RESPIRATORY: No cough, No wheezing, No shortness of breath  CARDIOVASCULAR: No chest pain, No palpitations  GASTROINTESTINAL: No abdominal pain No nausea, No vomiting, No diarrhea, No blood in stool; [ x] BM  GENITOURINARY: No dysuria, No hematuria, No incontinence  NEUROLOGICAL: +decreased sensation in left extremities, No headaches, No dizziness, No tingling, No tremors, No weakness  EXTREMITIES: +swelling, denies Pain  SKIN: + ulcers in both legs, +dry skin, +redness  MUSCULOSKELETAL: No joint pain, No joint swelling; No muscle pain, No back pain, No extremity pain  PSYCHIATRIC: No difficulty sleeping at night  PAIN SCALE: [ x ] None  [  ] Other-  REST OF REVIEW OF SYSTEMS: [x  ] Normal     Vital Signs Last 24 Hrs  T(C): 36.4 (27 Aug 2020 05:00), Max: 37.1 (26 Aug 2020 09:23)  T(F): 97.5 (27 Aug 2020 05:00), Max: 98.8 (26 Aug 2020 09:23)  HR: 56 (27 Aug 2020 05:00) (56 - 72)  BP: 131/70 (27 Aug 2020 05:00) (126/77 - 172/80)  BP(mean): --  RR: 17 (27 Aug 2020 05:00) (16 - 18)  SpO2: 94% (27 Aug 2020 05:00) (94% - 97%)      Finger Stick      I&O's Summary    PHYSICAL EXAM:  GENERAL:  [x  ] NAD, [ x ] Well appearing, [  ] Agitated, [  ] Drowsy, [  ] Lethargy, [  ] Confused   HEAD:  [x  ] Normal, [  ] Other  EYES:  [x  ] EOMI, [x  ] PERRLA, [x  ] Conjunctiva and sclera clear normal, [  ] Other, [  ] Pallor, [  ] Discharge  ENMT:  [ x ] Normal, [x  ] Moist mucous membranes, [x  ] Good dentition, [ x ] No thrush  NECK:  [ x ] Supple, [x  ] No JVD, [ x ] Normal thyroid, [  ] Lymphadenopathy, [  ] Other  CHEST/LUNG:  [ x ] Clear to auscultation bilaterally, [ x ] Breath Sounds equal B/L / decreased, [  ] Poor effort, [ x ] No rales, [ x ] No rhonchi, [x  ] No wheezing  HEART:  [ x ] Regular rate and rhythm, [  ] Tachycardia, [ x ] Bradycardia, [  ] Irregular, [ x ] No murmurs, No rubs, No gallops, [  ] PPM in place (Mfr:  )  ABDOMEN:  [ x ] Soft, [ x ] Nontender, [x  ] Nondistended, [x  ] No mass, [ + ] Bowel sounds present, [+  ] Obese  NERVOUS SYSTEM:  [ x ] Alert & Oriented x3, [  ] Nonfocal, [  ] Confusion, [  ] Encephalopathic, [  ] Sedated, [  ] Unable to assess, [  ] Dementia, [  ] Other-  EXTREMITIES:  [ x ] 2+ Peripheral Pulses, No clubbing, No cyanosis,  [ x ] +2 Edema B/L lower EXT R>L, chronic [ x ] PVD stasis skin changes B/L lower EXT, [ x ] Wound,  dressing around the L 3rd digit and R 2nd digit; partial amputation of L 2nd digit  LYMPH:  No lymphadenopathy noted  SKIN:  Lesion on R lower extremity, [ x ] Pressure ulcers, [ x ] Abrasion rt lower legEcchymosis, [ x ] Skin tears, [x  ] Other    DIET: Diet, Regular:   Consistent Carbohydrate No Snacks  DASH/TLC Sodium & Cholesterol Restricted  No Concentrated Potassium  No Concentrated Phosphorus (08-26-20 @ 12:12)  Diet, NPO after Midnight:      NPO Start Date: 26-Aug-2020,   NPO Start Time: 23:59  Except Medications (08-27-20 @ 04:47)      LABS:  cret                        13.2   7.02  )-----------( 200      ( 27 Aug 2020 08:47 )             41.0     08-27    141  |  107  |  23  ----------------------------<  95  4.2   |  31  |  1.20    Ca    8.7      27 Aug 2020 08:47    TPro  7.8  /  Alb  3.5  /  TBili  0.6  /  DBili  x   /  AST  16  /  ALT  17  /  AlkPhos  111  08-26       Anemia Panel:      Thyroid Panel:      RADIOLOGY & ADDITIONAL TESTS: NONE      HEALTH ISSUES - PROBLEM Dx:  Type 1 diabetes mellitus with stage 5 chronic kidney disease not on chronic dialysis: Type 1 diabetes mellitus with stage 5 chronic kidney disease not on chronic dialysis  Osteomyelitis: Osteomyelitis  Need for prophylactic measure: Need for prophylactic measure  Hypertension: Hypertension  Hyperlipidemia: Hyperlipidemia  Diabetes mellitus: Diabetes mellitus  Renal transplant recipient: Renal transplant recipient  CKD (chronic kidney disease): CKD (chronic kidney disease)  Diabetic peripheral neuropathy: Diabetic peripheral neuropathy  CVA (cerebral vascular accident): CVA (cerebral vascular accident)  Diabetic ulcer of toe: Diabetic ulcer of toe  Diabetic ulcer of toe of left foot: Diabetic ulcer of toe of left foot        Consultant(s) Notes Reviewed:  [x  ] YES     Care Discussed with [ x ] Consultants, [ x ] Patient, [  ] Family, [  ] HCP, [  ] , [  ] Social Service, [x  ] RN, [  ] Physical Therapy, [  ] Palliative Care Team  DVT PPX: [  ] Lovenox, [  ] SC Heparin, [  ] Coumadin, [  ] Xarelto, [  ] Eliquis, [  ] Pradaxa, [  ] IV Heparin drip, [  ] SCD, [  ] Ambulation, [  ] Contraindicated 2/2 GI Bleed, [  ] Contraindicated 2/2  Bleed, [  ] Contraindicated 2/2 Brain Bleed  Advanced Directive: [ x ] None, [  ] DNR/DNI

## 2020-08-28 LAB
ALBUMIN SERPL ELPH-MCNC: 3.2 G/DL — LOW (ref 3.3–5)
ALP SERPL-CCNC: 109 U/L — SIGNIFICANT CHANGE UP (ref 40–120)
ALT FLD-CCNC: 16 U/L — SIGNIFICANT CHANGE UP (ref 12–78)
ANION GAP SERPL CALC-SCNC: 3 MMOL/L — LOW (ref 5–17)
AST SERPL-CCNC: 13 U/L — LOW (ref 15–37)
BASOPHILS # BLD AUTO: 0.05 K/UL — SIGNIFICANT CHANGE UP (ref 0–0.2)
BASOPHILS NFR BLD AUTO: 0.7 % — SIGNIFICANT CHANGE UP (ref 0–2)
BILIRUB SERPL-MCNC: 0.5 MG/DL — SIGNIFICANT CHANGE UP (ref 0.2–1.2)
BUN SERPL-MCNC: 21 MG/DL — SIGNIFICANT CHANGE UP (ref 7–23)
CALCIUM SERPL-MCNC: 9.1 MG/DL — SIGNIFICANT CHANGE UP (ref 8.5–10.1)
CHLORIDE SERPL-SCNC: 107 MMOL/L — SIGNIFICANT CHANGE UP (ref 96–108)
CO2 SERPL-SCNC: 31 MMOL/L — SIGNIFICANT CHANGE UP (ref 22–31)
CREAT SERPL-MCNC: 1.1 MG/DL — SIGNIFICANT CHANGE UP (ref 0.5–1.3)
EOSINOPHIL # BLD AUTO: 0.3 K/UL — SIGNIFICANT CHANGE UP (ref 0–0.5)
EOSINOPHIL NFR BLD AUTO: 4.4 % — SIGNIFICANT CHANGE UP (ref 0–6)
GLUCOSE SERPL-MCNC: 80 MG/DL — SIGNIFICANT CHANGE UP (ref 70–99)
HCT VFR BLD CALC: 42.3 % — SIGNIFICANT CHANGE UP (ref 39–50)
HGB BLD-MCNC: 13.9 G/DL — SIGNIFICANT CHANGE UP (ref 13–17)
IMM GRANULOCYTES NFR BLD AUTO: 0.3 % — SIGNIFICANT CHANGE UP (ref 0–1.5)
LYMPHOCYTES # BLD AUTO: 1.19 K/UL — SIGNIFICANT CHANGE UP (ref 1–3.3)
LYMPHOCYTES # BLD AUTO: 17.6 % — SIGNIFICANT CHANGE UP (ref 13–44)
MCHC RBC-ENTMCNC: 29 PG — SIGNIFICANT CHANGE UP (ref 27–34)
MCHC RBC-ENTMCNC: 32.9 GM/DL — SIGNIFICANT CHANGE UP (ref 32–36)
MCV RBC AUTO: 88.1 FL — SIGNIFICANT CHANGE UP (ref 80–100)
MONOCYTES # BLD AUTO: 0.53 K/UL — SIGNIFICANT CHANGE UP (ref 0–0.9)
MONOCYTES NFR BLD AUTO: 7.8 % — SIGNIFICANT CHANGE UP (ref 2–14)
NEUTROPHILS # BLD AUTO: 4.67 K/UL — SIGNIFICANT CHANGE UP (ref 1.8–7.4)
NEUTROPHILS NFR BLD AUTO: 69.2 % — SIGNIFICANT CHANGE UP (ref 43–77)
NRBC # BLD: 0 /100 WBCS — SIGNIFICANT CHANGE UP (ref 0–0)
PLATELET # BLD AUTO: 203 K/UL — SIGNIFICANT CHANGE UP (ref 150–400)
POTASSIUM SERPL-MCNC: 4.1 MMOL/L — SIGNIFICANT CHANGE UP (ref 3.5–5.3)
POTASSIUM SERPL-SCNC: 4.1 MMOL/L — SIGNIFICANT CHANGE UP (ref 3.5–5.3)
PROT SERPL-MCNC: 7.2 G/DL — SIGNIFICANT CHANGE UP (ref 6–8.3)
RBC # BLD: 4.8 M/UL — SIGNIFICANT CHANGE UP (ref 4.2–5.8)
RBC # FLD: 13.1 % — SIGNIFICANT CHANGE UP (ref 10.3–14.5)
SODIUM SERPL-SCNC: 141 MMOL/L — SIGNIFICANT CHANGE UP (ref 135–145)
WBC # BLD: 6.76 K/UL — SIGNIFICANT CHANGE UP (ref 3.8–10.5)
WBC # FLD AUTO: 6.76 K/UL — SIGNIFICANT CHANGE UP (ref 3.8–10.5)

## 2020-08-28 PROCEDURE — 99024 POSTOP FOLLOW-UP VISIT: CPT

## 2020-08-28 PROCEDURE — 99232 SBSQ HOSP IP/OBS MODERATE 35: CPT

## 2020-08-28 RX ADMIN — HEPARIN SODIUM 5000 UNIT(S): 5000 INJECTION INTRAVENOUS; SUBCUTANEOUS at 05:25

## 2020-08-28 RX ADMIN — ATORVASTATIN CALCIUM 10 MILLIGRAM(S): 80 TABLET, FILM COATED ORAL at 21:08

## 2020-08-28 RX ADMIN — AZATHIOPRINE 100 MILLIGRAM(S): 100 TABLET ORAL at 12:03

## 2020-08-28 RX ADMIN — Medication 75 MILLIGRAM(S): at 18:07

## 2020-08-28 RX ADMIN — SODIUM CHLORIDE 75 MILLILITER(S): 9 INJECTION INTRAMUSCULAR; INTRAVENOUS; SUBCUTANEOUS at 05:34

## 2020-08-28 RX ADMIN — Medication 0.1 MILLIGRAM(S): at 05:24

## 2020-08-28 RX ADMIN — Medication 325 MILLIGRAM(S): at 12:04

## 2020-08-28 RX ADMIN — Medication 75 MILLIGRAM(S): at 05:25

## 2020-08-28 RX ADMIN — Medication 25 MILLIGRAM(S): at 13:01

## 2020-08-28 RX ADMIN — FAMOTIDINE 20 MILLIGRAM(S): 10 INJECTION INTRAVENOUS at 12:04

## 2020-08-28 RX ADMIN — LOSARTAN POTASSIUM 25 MILLIGRAM(S): 100 TABLET, FILM COATED ORAL at 05:24

## 2020-08-28 RX ADMIN — GABAPENTIN 300 MILLIGRAM(S): 400 CAPSULE ORAL at 05:25

## 2020-08-28 RX ADMIN — Medication 25 MILLIGRAM(S): at 05:25

## 2020-08-28 RX ADMIN — CEFTRIAXONE 100 MILLIGRAM(S): 500 INJECTION, POWDER, FOR SOLUTION INTRAMUSCULAR; INTRAVENOUS at 21:08

## 2020-08-28 RX ADMIN — Medication 25 MILLIGRAM(S): at 21:08

## 2020-08-28 RX ADMIN — TACROLIMUS 1.5 MILLIGRAM(S): 5 CAPSULE ORAL at 09:40

## 2020-08-28 RX ADMIN — HEPARIN SODIUM 5000 UNIT(S): 5000 INJECTION INTRAVENOUS; SUBCUTANEOUS at 21:08

## 2020-08-28 RX ADMIN — Medication 1 TABLET(S): at 12:05

## 2020-08-28 RX ADMIN — Medication 0.1 MILLIGRAM(S): at 18:07

## 2020-08-28 RX ADMIN — HEPARIN SODIUM 5000 UNIT(S): 5000 INJECTION INTRAVENOUS; SUBCUTANEOUS at 13:01

## 2020-08-28 RX ADMIN — GABAPENTIN 300 MILLIGRAM(S): 400 CAPSULE ORAL at 18:07

## 2020-08-28 RX ADMIN — TACROLIMUS 1.5 MILLIGRAM(S): 5 CAPSULE ORAL at 21:09

## 2020-08-28 NOTE — PROGRESS NOTE ADULT - PROBLEM SELECTOR PLAN 3
- H/O Renal Transplant at [facility]- LDRT  -if Cr worsens, consider stopping Losartan  -Cr stable   -nephro Dr. Bryant follow up, BMP in AM - H/O Renal Transplant at [facility]- LDRT  -if Cr worsens, consider stopping Losartan  -Cr stable   -nephro Dr. Bryant following the case - H/O Renal Transplant at [facility]- LDRT  --Cr stable   -if Cr worsens, consider stopping Losartan  -nephro Dr. Bryant following the case

## 2020-08-28 NOTE — PROGRESS NOTE ADULT - SUBJECTIVE AND OBJECTIVE BOX
[facility] -- Julisa Jansen Grossman, Wachsman, Pannella, Patel, Savella, Goodger  Office # [phone]    Follow Up:  Preop/Postop Optimization    Subjective/Observations: Awake and alert, denies any foot pain.  Denies any respiratory or cardiac discomfort at this time.  Claims to have ambulated to the BR with no symptoms    REVIEW OF SYSTEMS: All other review of systems is negative unless indicated above  PAST MEDICAL & SURGICAL HISTORY:  History of DVT (deep vein thrombosis)  Squamous cell carcinoma of skin of left ear  CVA (cerebral vascular accident)  Obesity (BMI 30-39.9)  Diabetic peripheral neuropathy  CKD (chronic kidney disease)  Diabetes mellitus  Hyperlipidemia  Hypertension  S/P kidney transplant  End-stage renal failure with renal transplant: 12/2013  H/O foot surgery: 2005 - right foot - bone fracture - s/p ORIF and subsequent removal of hardware  Vasectomy status: s/p b/l  vasectomy  Ear disease: Left inner ear surgery  Toe infection: 2007 L 4th Toe surgery-amputation secondary to osteomyelitis    MEDICATIONS  (STANDING):  aspirin 325 milliGRAM(s) Oral daily  atorvastatin 10 milliGRAM(s) Oral at bedtime  azaTHIOprine 100 milliGRAM(s) Oral daily  cefTRIAXone   IVPB 2000 milliGRAM(s) IV Intermittent every 24 hours  cloNIDine 0.1 milliGRAM(s) Oral every 12 hours  dextrose 5%. 1000 milliLiter(s) (50 mL/Hr) IV Continuous <Continuous>  dextrose 5%. 1000 milliLiter(s) (50 mL/Hr) IV Continuous <Continuous>  dextrose 50% Injectable 12.5 Gram(s) IV Push once  dextrose 50% Injectable 25 Gram(s) IV Push once  dextrose 50% Injectable 25 Gram(s) IV Push once  dextrose 50% Injectable 12.5 Gram(s) IV Push once  dextrose 50% Injectable 25 Gram(s) IV Push once  dextrose 50% Injectable 25 Gram(s) IV Push once  famotidine    Tablet 20 milliGRAM(s) Oral daily  gabapentin 300 milliGRAM(s) Oral two times a day  heparin   Injectable 5000 Unit(s) SubCutaneous every 8 hours  hydrALAZINE 25 milliGRAM(s) Oral every 8 hours  insulin lispro (HumaLOG) Pump 1 Each SubCutaneous Continuous Pump  losartan 25 milliGRAM(s) Oral daily  metoprolol tartrate 75 milliGRAM(s) Oral two times a day  tacrolimus 1.5 milliGRAM(s) Oral two times a day  trimethoprim   80 mG/sulfamethoxazole 400 mG 1 Tablet(s) Oral daily    MEDICATIONS  (PRN):  dextrose 40% Gel 15 Gram(s) Oral once PRN Blood Glucose LESS THAN 70 milliGRAM(s)/deciliter  dextrose 40% Gel 15 Gram(s) Oral once PRN Blood Glucose LESS THAN 70 milliGRAM(s)/deciliter  glucagon  Injectable 1 milliGRAM(s) IntraMuscular once PRN Glucose LESS THAN 70 milligrams/deciliter  glucagon  Injectable 1 milliGRAM(s) IntraMuscular once PRN Glucose LESS THAN 70 milligrams/deciliter    Allergies    No Known Allergies    Intolerances      Vital Signs Last 24 Hrs  T(C): 36.4 (28 Aug 2020 04:59), Max: 37 (27 Aug 2020 11:55)  T(F): 97.6 (28 Aug 2020 04:59), Max: 98.6 (27 Aug 2020 11:55)  HR: 61 (28 Aug 2020 04:59) (57 - 71)  BP: 127/65 (28 Aug 2020 04:59) (89/48 - 154/77)  BP(mean): --  RR: 19 (28 Aug 2020 04:59) (12 - 19)  SpO2: 96% (28 Aug 2020 04:59) (92% - 99%)  I&O's Summary    27 Aug 2020 07:01  -  28 Aug 2020 07:00  --------------------------------------------------------  IN: 1305 mL / OUT: 1650 mL / NET: -345 mL    28 Aug 2020 07:01  -  28 Aug 2020 11:14  --------------------------------------------------------  IN: 0 mL / OUT: 800 mL / NET: -800 mL      Weight (kg): 141.5 (08-27 @ 12:35)    PHYSICAL EXAM:  TELE: Not on tele  Constitutional: NAD, awake and alert, morbidly obese  HEENT: Moist Mucous Membranes, Anicteric  Pulmonary: Non-labored, breath sounds are clear but diminished bilaterally, No wheezing, rales or rhonchi  Cardiovascular: Regular, S1 and S2, No murmurs, rubs, gallops or clicks  Gastrointestinal: Bowel Sounds present, soft, nontender.   MSK: B/L foot dressing dry and intact  Lymph: No peripheral edema. No lymphadenopathy.  Skin: No visible rashes  Psych:  Mood & affect appropriate  LABS: All Labs Reviewed:                        13.9   6.76  )-----------( 203      ( 28 Aug 2020 07:50 )             42.3                         13.2   7.02  )-----------( 200      ( 27 Aug 2020 08:47 )             41.0                         13.8   7.44  )-----------( 231      ( 26 Aug 2020 10:34 )             42.2     28 Aug 2020 07:50    141    |  107    |  21     ----------------------------<  80     4.1     |  31     |  1.10   27 Aug 2020 08:47    141    |  107    |  23     ----------------------------<  95     4.2     |  31     |  1.20   26 Aug 2020 10:34    142    |  107    |  26     ----------------------------<  113    4.6     |  32     |  1.40     Ca    9.1        28 Aug 2020 07:50  Ca    8.7        27 Aug 2020 08:47  Ca    9.1        26 Aug 2020 10:34    TPro  7.2    /  Alb  3.2    /  TBili  0.5    /  DBili  x      /  AST  13     /  ALT  16     /  AlkPhos  109    28 Aug 2020 07:50  TPro  7.8    /  Alb  3.5    /  TBili  0.6    /  DBili  x      /  AST  16     /  ALT  17     /  AlkPhos  111    26 Aug 2020 10:34      < from: TTE with Doppler (w/Cont) (07.27.17 @ 09:29) >    Patient name: LUTHER NORIEGA  YOB: 1965   Age: 51 (M)   MR#: [id number]  Study Date: 7/27/2017  Location: O/PSonographer: Suise Zuleta RDCS  Study quality: Technically difficult  Referring Physician: Kashmir Ochoa MD  Blood Pressure: 150/77 mmHg  Height: 185 cm  Weight: 136 kg  BSA: 2.6 m2  ------------------------------------------------------------------------  PROCEDURE: Transthoracic echocardiogram with 2-D, M-Mode  and complete spectral and color flow Doppler. Intravenous  catheter inserted. Verbal consent was obtained for  injection of echo contrast following a discussion of risks  and benefits. Following intravenous injection of contrast,  harmonic imaging was performed.  INDICATION: Primary pulmonary hypertension(I27.0)  ------------------------------------------------------------------------  Dimensions:    Normal Values:  LA:     4.0    2.0 - 4.0 cm  Ao:     3.5    2.0 - 3.8 cm  SEPTUM: 1.1    0.6 - 1.2 cm  PWT:    1.2    0.6 - 1.1 cm  LVIDd:  4.9    3.0 -5.6 cm  LVIDs:  3.3    1.8 - 4.0 cm  Derived variables:  LVMI: 83 g/m2  RWT: 0.48  Fractional short: 33 %  EF (Teicholtz): 61 %  ------------------------------------------------------------------------  Observations:  Mitral Valve: Normal mitral valve. Minimal mitral  regurgitation.  Aortic Valve/Aorta: Normal trileaflet aortic valve.  Aortic Root: 3.5 cm.  Left Atrium: Normal left atrium.  LA volume index = 20  cc/m2.  Left Ventricle: Normal left ventricular systolic function.  No segmental wallmotion abnormalities. Endocardial  visualization enhanced with intravenous injection of echo  contrast (Definity). Increased relative wall thickness with  normal left ventricular mass index, consistent with  concentric left ventricular remodeling.  Right Heart: Normal right atrium. Right ventricular  enlargement with normal right ventricular systolic  function. Normal tricuspid valve. Minimal tricuspid  regurgitation. Normal pulmonic valve.  Pericardium/Pleura: Normal pericardium with no pericardial  effusion.  Hemodynamic: Estimated right atrial pressure is 8 mm Hg.  Estimated right ventricular systolic pressure equals 29 mm  Hg, assuming right atrial pressure equals 8 mm Hg,  consistent with normal pulmonary pressures.  ------------------------------------------------------------------------  Conclusions:  1. Increased relative wall thickness with normal left  ventricular mass index, consistent with concentric left  ventricular remodeling.  2. Normal left ventricular systolic function. No segmental  wall motion abnormalities. Endocardial visualization  enhanced with intravenous injection of echo contrast  (Definity).  ------------------------------------------------------------------------  Confirmed on  7/27/2017 - 13:49:24 by RASHEED Del Cid  ------------------------------------------------------------------------    < end of copied text >    < from: Xray Chest 1 View AP/PA (08.26.20 @ 10:03) >    EXAM:  XR CHEST AP OR PA 1V                            PROCEDURE DATE:  08/26/2020          INTERPRETATION:  PROCEDURE: AP view of the chest.    CLINICAL INFORMATION: Admission.    COMPARISON: 04/30/2017.    FINDINGS:    Lungs: The lungs are clear.Mild elevation of the right hemidiaphragm.  Heart: The heart is normal in size. Loop recorder overlies the heart.  Mediastinum: The mediastinum is within normal limits.    IMPRESSION:  Mild elevation of the right hemidiaphragm. No focal consolidation.    TAISHA HOLT M.D., ATTENDING RADIOLOGIST  This document has been electronically signed. Aug 26 2020 10:27AM      < end of copied text >    < from: 12 Lead ECG (08.26.20 @ 10:21) >    Ventricular Rate 61 BPM    Atrial Rate 61 BPM    P-R Interval 256 ms    QRS Duration 98 ms    Q-T Interval 394 ms    QTC Calculation(Bezet) 396 ms    P Axis 40 degrees    R Axis 2 degrees    T Axis 10 degrees    Diagnosis Line Sinus rhythm with 1stdegree AV block  Otherwise normal ECG  When compared with ECG of 16-DEC-2019 16:49,  No significant change was found  Confirmed by Marko Mauricio MD ([id number]) on 8/26/2020 12:17:37 PM    < end of copied text >

## 2020-08-28 NOTE — DIETITIAN INITIAL EVALUATION ADULT. - PROBLEM SELECTOR PLAN 4
s/p right renal transplant for CKD (2013)  -continue home azathioprine and tacrolimus,  -Renal Dr Bryant , BMP in AM

## 2020-08-28 NOTE — DIETITIAN INITIAL EVALUATION ADULT. - PROBLEM SELECTOR PLAN 5
chronic, on insulin pump  -Pat Weil diabetes educator to see patient  -Endo Dr. Perlman consulted, f/u recs  -follow up A1C, 7.1 in 12/2019  -Nutritional Eval.

## 2020-08-28 NOTE — PROGRESS NOTE ADULT - SUBJECTIVE AND OBJECTIVE BOX
54y year old Male seen at \[facility]\"" 1EAS 104 W1 s/p left foot partial 3rd digit amputation and right foot partial 2nd digit amputation (DOS: 8/27/2020) secondary to osteomyelitis. Patient was resting in bed comfortably and was in NAD. Patient states he has been eating and drinking well without any complications. Patient has not had bowel movement post op but denies any abdomina pain or discomfort. Patient denies any fever, chills, nausea, vomiting, chest pain, shortness of breath, or calf pain at this time.    Allergies    No Known Allergies    Intolerances        MEDICATIONS  (STANDING):  aspirin 325 milliGRAM(s) Oral daily  atorvastatin 10 milliGRAM(s) Oral at bedtime  azaTHIOprine 100 milliGRAM(s) Oral daily  cefTRIAXone   IVPB 2000 milliGRAM(s) IV Intermittent every 24 hours  cloNIDine 0.1 milliGRAM(s) Oral every 12 hours  dextrose 5%. 1000 milliLiter(s) (50 mL/Hr) IV Continuous <Continuous>  dextrose 5%. 1000 milliLiter(s) (50 mL/Hr) IV Continuous <Continuous>  dextrose 50% Injectable 12.5 Gram(s) IV Push once  dextrose 50% Injectable 25 Gram(s) IV Push once  dextrose 50% Injectable 25 Gram(s) IV Push once  dextrose 50% Injectable 12.5 Gram(s) IV Push once  dextrose 50% Injectable 25 Gram(s) IV Push once  dextrose 50% Injectable 25 Gram(s) IV Push once  famotidine    Tablet 20 milliGRAM(s) Oral daily  gabapentin 300 milliGRAM(s) Oral two times a day  heparin   Injectable 5000 Unit(s) SubCutaneous every 8 hours  hydrALAZINE 25 milliGRAM(s) Oral every 8 hours  insulin lispro (HumaLOG) Pump 1 Each SubCutaneous Continuous Pump  losartan 25 milliGRAM(s) Oral daily  metoprolol tartrate 75 milliGRAM(s) Oral two times a day  tacrolimus 1.5 milliGRAM(s) Oral two times a day  trimethoprim   80 mG/sulfamethoxazole 400 mG 1 Tablet(s) Oral daily    MEDICATIONS  (PRN):  dextrose 40% Gel 15 Gram(s) Oral once PRN Blood Glucose LESS THAN 70 milliGRAM(s)/deciliter  dextrose 40% Gel 15 Gram(s) Oral once PRN Blood Glucose LESS THAN 70 milliGRAM(s)/deciliter  glucagon  Injectable 1 milliGRAM(s) IntraMuscular once PRN Glucose LESS THAN 70 milligrams/deciliter  glucagon  Injectable 1 milliGRAM(s) IntraMuscular once PRN Glucose LESS THAN 70 milligrams/deciliter      Vital Signs Last 24 Hrs  T(C): 36.4 (28 Aug 2020 04:59), Max: 36.7 (27 Aug 2020 14:10)  T(F): 97.6 (28 Aug 2020 04:59), Max: 98.1 (27 Aug 2020 14:10)  HR: 61 (28 Aug 2020 04:59) (57 - 71)  BP: 127/65 (28 Aug 2020 04:59) (89/48 - 154/70)  BP(mean): --  RR: 19 (28 Aug 2020 04:59) (12 - 19)  SpO2: 96% (28 Aug 2020 04:59) (92% - 99%)    PHYSICAL EXAM:  Vascular: DP & PT palpable bilaterally, Capillary refill 3 seconds, Digital hair absent bilaterally  Neurological: Light touch sensation diminished to the level of the lower third of the leg bilaterally   Musculoskeletal: 5/5 strength in all quadrants bilaterally, AJ & STJ ROM intact. Absence of left 4th digit and partial left 2nd digit secondary to amputation for bone infection   Dermatological: Skin of bilateral lower extremity is warm to touch. Sutures intact to bilateral surgical sites with no dehiscence, mild serosanguinous drianage, mild erythema noted to the right 2nd digit, no fluctuance, no proximal streaking     CBC Full  -  ( 28 Aug 2020 07:50 )  WBC Count : 6.76 K/uL  RBC Count : 4.80 M/uL  Hemoglobin : 13.9 g/dL  Hematocrit : 42.3 %  Platelet Count - Automated : 203 K/uL  Mean Cell Volume : 88.1 fl  Mean Cell Hemoglobin : 29.0 pg  Mean Cell Hemoglobin Concentration : 32.9 gm/dL  Auto Neutrophil # : 4.67 K/uL  Auto Lymphocyte # : 1.19 K/uL  Auto Monocyte # : 0.53 K/uL  Auto Eosinophil # : 0.30 K/uL  Auto Basophil # : 0.05 K/uL  Auto Neutrophil % : 69.2 %  Auto Lymphocyte % : 17.6 %  Auto Monocyte % : 7.8 %  Auto Eosinophil % : 4.4 %  Auto Basophil % : 0.7 %      08-28    141  |  107  |  21  ----------------------------<  80  4.1   |  31  |  1.10    Ca    9.1      28 Aug 2020 07:50    TPro  7.2  /  Alb  3.2<L>  /  TBili  0.5  /  DBili  x   /  AST  13<L>  /  ALT  16  /  AlkPhos  109  08-28          Culture - Tissue with Gram Stain (collected 27 Aug 2020 22:01)  Source: .Tissue Other, distal left 3rd toe  Gram Stain (27 Aug 2020 23:26):    No polymorphonuclear cells seen per low power field    No organisms seen per oil power field    Culture - Tissue with Gram Stain (collected 27 Aug 2020 22:01)  Source: .Tissue Other, distal right 2nd toe  Gram Stain (27 Aug 2020 23:26):    No polymorphonuclear cells seen per low power field    No organisms seen per oil power field    Culture - Blood (collected 26 Aug 2020 16:46)  Source: .Blood Blood  Preliminary Report (27 Aug 2020 17:01):    No growth to date.    Culture - Blood (collected 26 Aug 2020 16:46)  Source: .Blood Blood  Preliminary Report (27 Aug 2020 17:01):    No growth to date.        Imaging: ----------  < from: Xray Foot AP + Lateral + Oblique, Bilat (08.27.20 @ 14:50) >  EXAM:  FOOT BILATERAL (MINIMUM 3 V)                            PROCEDURE DATE:  08/27/2020          INTERPRETATION:  Bilateral feet    HISTORY: Postop    COMPARISON: 8/26/2020     Three views of the right foot show interval amputation of the distalhalf of the second distal phalanx.    Three views of the left foot show interval amputation of the third toe at the level of the middle of the third digit proximal phalanx.    IMPRESSION: Postoperative changes.    < end of copied text > 54y year old Male seen at [facility] 1EAS 104 W1 s/p left foot partial 3rd digit amputation and right foot partial 2nd digit amputation (DOS: 8/27/2020) secondary to osteomyelitis. Patient was resting in bed comfortably and was in NAD. Patient states he has been eating and drinking well without any complications. Patient has not had bowel movement post op but denies any abdominal pain or discomfort. Patient denies any fever, chills, nausea, vomiting, chest pain, shortness of breath, or calf pain at this time.    Allergies    No Known Allergies    Intolerances        MEDICATIONS  (STANDING):  aspirin 325 milliGRAM(s) Oral daily  atorvastatin 10 milliGRAM(s) Oral at bedtime  azaTHIOprine 100 milliGRAM(s) Oral daily  cefTRIAXone   IVPB 2000 milliGRAM(s) IV Intermittent every 24 hours  cloNIDine 0.1 milliGRAM(s) Oral every 12 hours  dextrose 5%. 1000 milliLiter(s) (50 mL/Hr) IV Continuous <Continuous>  dextrose 5%. 1000 milliLiter(s) (50 mL/Hr) IV Continuous <Continuous>  dextrose 50% Injectable 12.5 Gram(s) IV Push once  dextrose 50% Injectable 25 Gram(s) IV Push once  dextrose 50% Injectable 25 Gram(s) IV Push once  dextrose 50% Injectable 12.5 Gram(s) IV Push once  dextrose 50% Injectable 25 Gram(s) IV Push once  dextrose 50% Injectable 25 Gram(s) IV Push once  famotidine    Tablet 20 milliGRAM(s) Oral daily  gabapentin 300 milliGRAM(s) Oral two times a day  heparin   Injectable 5000 Unit(s) SubCutaneous every 8 hours  hydrALAZINE 25 milliGRAM(s) Oral every 8 hours  insulin lispro (HumaLOG) Pump 1 Each SubCutaneous Continuous Pump  losartan 25 milliGRAM(s) Oral daily  metoprolol tartrate 75 milliGRAM(s) Oral two times a day  tacrolimus 1.5 milliGRAM(s) Oral two times a day  trimethoprim   80 mG/sulfamethoxazole 400 mG 1 Tablet(s) Oral daily    MEDICATIONS  (PRN):  dextrose 40% Gel 15 Gram(s) Oral once PRN Blood Glucose LESS THAN 70 milliGRAM(s)/deciliter  dextrose 40% Gel 15 Gram(s) Oral once PRN Blood Glucose LESS THAN 70 milliGRAM(s)/deciliter  glucagon  Injectable 1 milliGRAM(s) IntraMuscular once PRN Glucose LESS THAN 70 milligrams/deciliter  glucagon  Injectable 1 milliGRAM(s) IntraMuscular once PRN Glucose LESS THAN 70 milligrams/deciliter      Vital Signs Last 24 Hrs  T(C): 36.4 (28 Aug 2020 04:59), Max: 36.7 (27 Aug 2020 14:10)  T(F): 97.6 (28 Aug 2020 04:59), Max: 98.1 (27 Aug 2020 14:10)  HR: 61 (28 Aug 2020 04:59) (57 - 71)  BP: 127/65 (28 Aug 2020 04:59) (89/48 - 154/70)  BP(mean): --  RR: 19 (28 Aug 2020 04:59) (12 - 19)  SpO2: 96% (28 Aug 2020 04:59) (92% - 99%)    PHYSICAL EXAM:  Vascular: DP & PT palpable bilaterally, Capillary refill 3 seconds, Digital hair absent bilaterally  Neurological: Light touch sensation diminished to the level of the lower third of the leg bilaterally   Musculoskeletal: 5/5 strength in all quadrants bilaterally, AJ & STJ ROM intact. Absence of left 4th digit and partial left 2nd digit secondary to amputation for bone infection   Dermatological: Skin of bilateral lower extremity is warm to touch. Sutures intact to bilateral surgical sites with no dehiscence, mild serosanguinous drianage, mild erythema noted to the right 2nd digit, no fluctuance, no proximal streaking     CBC Full  -  ( 28 Aug 2020 07:50 )  WBC Count : 6.76 K/uL  RBC Count : 4.80 M/uL  Hemoglobin : 13.9 g/dL  Hematocrit : 42.3 %  Platelet Count - Automated : 203 K/uL  Mean Cell Volume : 88.1 fl  Mean Cell Hemoglobin : 29.0 pg  Mean Cell Hemoglobin Concentration : 32.9 gm/dL  Auto Neutrophil # : 4.67 K/uL  Auto Lymphocyte # : 1.19 K/uL  Auto Monocyte # : 0.53 K/uL  Auto Eosinophil # : 0.30 K/uL  Auto Basophil # : 0.05 K/uL  Auto Neutrophil % : 69.2 %  Auto Lymphocyte % : 17.6 %  Auto Monocyte % : 7.8 %  Auto Eosinophil % : 4.4 %  Auto Basophil % : 0.7 %      08-28    141  |  107  |  21  ----------------------------<  80  4.1   |  31  |  1.10    Ca    9.1      28 Aug 2020 07:50    TPro  7.2  /  Alb  3.2<L>  /  TBili  0.5  /  DBili  x   /  AST  13<L>  /  ALT  16  /  AlkPhos  109  08-28          Culture - Tissue with Gram Stain (collected 27 Aug 2020 22:01)  Source: .Tissue Other, distal left 3rd toe  Gram Stain (27 Aug 2020 23:26):    No polymorphonuclear cells seen per low power field    No organisms seen per oil power field    Culture - Tissue with Gram Stain (collected 27 Aug 2020 22:01)  Source: .Tissue Other, distal right 2nd toe  Gram Stain (27 Aug 2020 23:26):    No polymorphonuclear cells seen per low power field    No organisms seen per oil power field    Culture - Blood (collected 26 Aug 2020 16:46)  Source: .Blood Blood  Preliminary Report (27 Aug 2020 17:01):    No growth to date.    Culture - Blood (collected 26 Aug 2020 16:46)  Source: .Blood Blood  Preliminary Report (27 Aug 2020 17:01):    No growth to date.        Imaging: ----------  < from: Xray Foot AP + Lateral + Oblique, Bilat (08.27.20 @ 14:50) >  EXAM:  FOOT BILATERAL (MINIMUM 3 V)                            PROCEDURE DATE:  08/27/2020          INTERPRETATION:  Bilateral feet    HISTORY: Postop    COMPARISON: 8/26/2020     Three views of the right foot show interval amputation of the distalhalf of the second distal phalanx.    Three views of the left foot show interval amputation of the third toe at the level of the middle of the third digit proximal phalanx.    IMPRESSION: Postoperative changes.    < end of copied text >

## 2020-08-28 NOTE — PROGRESS NOTE ADULT - SUBJECTIVE AND OBJECTIVE BOX
infectious diseases progress note:    LUTHER NORIEGA is a 54y y. o. Male patient    No concerning overnight events    Allergies    No Known Allergies    Intolerances        ANTIBIOTICS/RELEVANT:  antimicrobials  cefTRIAXone   IVPB 2000 milliGRAM(s) IV Intermittent every 24 hours  trimethoprim   80 mG/sulfamethoxazole 400 mG 1 Tablet(s) Oral daily    immunologic:  azaTHIOprine 100 milliGRAM(s) Oral daily  tacrolimus 1.5 milliGRAM(s) Oral two times a day    OTHER:  aspirin 325 milliGRAM(s) Oral daily  atorvastatin 10 milliGRAM(s) Oral at bedtime  cloNIDine 0.1 milliGRAM(s) Oral every 12 hours  dextrose 40% Gel 15 Gram(s) Oral once PRN  dextrose 40% Gel 15 Gram(s) Oral once PRN  dextrose 5%. 1000 milliLiter(s) IV Continuous <Continuous>  dextrose 5%. 1000 milliLiter(s) IV Continuous <Continuous>  dextrose 50% Injectable 12.5 Gram(s) IV Push once  dextrose 50% Injectable 25 Gram(s) IV Push once  dextrose 50% Injectable 25 Gram(s) IV Push once  dextrose 50% Injectable 12.5 Gram(s) IV Push once  dextrose 50% Injectable 25 Gram(s) IV Push once  dextrose 50% Injectable 25 Gram(s) IV Push once  famotidine    Tablet 20 milliGRAM(s) Oral daily  gabapentin 300 milliGRAM(s) Oral two times a day  glucagon  Injectable 1 milliGRAM(s) IntraMuscular once PRN  glucagon  Injectable 1 milliGRAM(s) IntraMuscular once PRN  heparin   Injectable 5000 Unit(s) SubCutaneous every 8 hours  hydrALAZINE 25 milliGRAM(s) Oral every 8 hours  insulin lispro (HumaLOG) Pump 1 Each SubCutaneous Continuous Pump  losartan 25 milliGRAM(s) Oral daily  metoprolol tartrate 75 milliGRAM(s) Oral two times a day  sodium chloride 0.9%. 1000 milliLiter(s) IV Continuous <Continuous>      Objective:  Vital Signs Last 24 Hrs  T(C): 36.4 (28 Aug 2020 04:59), Max: 37 (27 Aug 2020 10:15)  T(F): 97.6 (28 Aug 2020 04:59), Max: 98.6 (27 Aug 2020 10:15)  HR: 61 (28 Aug 2020 04:59) (57 - 71)  BP: 127/65 (28 Aug 2020 04:59) (89/48 - 154/77)  BP(mean): --  RR: 19 (28 Aug 2020 04:59) (12 - 19)  SpO2: 96% (28 Aug 2020 04:59) (92% - 99%)    T(C): 36.4 (08-28-20 @ 04:59), Max: 37 (08-26-20 @ 18:45)  T(C): 36.4 (08-28-20 @ 04:59), Max: 37.1 (08-26-20 @ 09:23)  T(C): 36.4 (08-28-20 @ 04:59), Max: 37.1 (08-26-20 @ 09:23)    PHYSICAL EXAM:  HEENT: NC atraumatic  Neck: supple  Respiratory: no accessory muscle use, breathing comfortably  Cardiovascular: distant  Gastrointestinal: normal appearing, nondistended  Extremities: no clubbing, no cyanosis, feet wrapped      LABS:                          13.9   6.76  )-----------( 203      ( 28 Aug 2020 07:50 )             42.3       6.76 08-28 @ 07:50  7.02 08-27 @ 08:47  7.44 08-26 @ 10:34      08-28    141  |  107  |  21  ----------------------------<  80  4.1   |  31  |  1.10    Ca    9.1      28 Aug 2020 07:50    TPro  7.2  /  Alb  3.2<L>  /  TBili  0.5  /  DBili  x   /  AST  13<L>  /  ALT  16  /  AlkPhos  109  08-28      Creatinine, Serum: 1.10 mg/dL (08-28-20 @ 07:50)  Creatinine, Serum: 1.20 mg/dL (08-27-20 @ 08:47)  Creatinine, Serum: 1.40 mg/dL (08-26-20 @ 10:34)                INFLAMMATORY MARKERS  Auto Neutrophil #: 4.67 K/uL (08-28-20 @ 07:50)  Auto Lymphocyte #: 1.19 K/uL (08-28-20 @ 07:50)  Auto Neutrophil #: 4.67 K/uL (08-27-20 @ 08:47)  Auto Lymphocyte #: 1.24 K/uL (08-27-20 @ 08:47)  Auto Neutrophil #: 5.47 K/uL (08-26-20 @ 10:34)  Auto Lymphocyte #: 1.06 K/uL (08-26-20 @ 10:34)      Auto Eosinophil #: 0.30 K/uL (08-28-20 @ 07:50)  Auto Eosinophil #: 0.40 K/uL (08-27-20 @ 08:47)  Auto Eosinophil #: 0.32 K/uL (08-26-20 @ 10:34)      Sedimentation Rate, Erythrocyte: 25 mm/hr (08-26-20 @ 10:34)      MICROBIOLOGY:    Culture - Tissue with Gram Stain (08.27.20 @ 22:01)    Gram Stain:   No polymorphonuclear cells seen per low power field  No organisms seen per oil power field    Specimen Source: .Tissue Other, distal left 3rd toe    Culture - Tissue with Gram Stain (08.27.20 @ 22:01)    Gram Stain:   No polymorphonuclear cells seen per low power field  No organisms seen per oil power field    Specimen Source: .Tissue Other, distal right 2nd toe        RADIOLOGY & ADDITIONAL STUDIES:

## 2020-08-28 NOTE — PROGRESS NOTE ADULT - SUBJECTIVE AND OBJECTIVE BOX
Patient is a 54y old  Male who presents with a chief complaint of r/o osteomyelitis (27 Aug 2020 11:10)  Patient seen in follow up for CKD, h/o Kidney transplant.     Feels well. No new complaints.        PAST MEDICAL HISTORY:  History of DVT (deep vein thrombosis)  Squamous cell carcinoma of skin of left ear  CVA (cerebral vascular accident)  Obesity (BMI 30-39.9)  Diabetic peripheral neuropathy  CKD (chronic kidney disease)  Diabetes mellitus  Hyperlipidemia  Hypertension    MEDICATIONS  (STANDING):  aspirin 325 milliGRAM(s) Oral daily  atorvastatin 10 milliGRAM(s) Oral at bedtime  azaTHIOprine 100 milliGRAM(s) Oral daily  cefTRIAXone   IVPB 2000 milliGRAM(s) IV Intermittent every 24 hours  cloNIDine 0.1 milliGRAM(s) Oral every 12 hours  dextrose 5%. 1000 milliLiter(s) (50 mL/Hr) IV Continuous <Continuous>  dextrose 5%. 1000 milliLiter(s) (50 mL/Hr) IV Continuous <Continuous>  dextrose 50% Injectable 12.5 Gram(s) IV Push once  dextrose 50% Injectable 25 Gram(s) IV Push once  dextrose 50% Injectable 25 Gram(s) IV Push once  dextrose 50% Injectable 12.5 Gram(s) IV Push once  dextrose 50% Injectable 25 Gram(s) IV Push once  dextrose 50% Injectable 25 Gram(s) IV Push once  famotidine    Tablet 20 milliGRAM(s) Oral daily  gabapentin 300 milliGRAM(s) Oral two times a day  heparin   Injectable 5000 Unit(s) SubCutaneous every 8 hours  hydrALAZINE 25 milliGRAM(s) Oral every 8 hours  insulin lispro (HumaLOG) Pump 1 Each SubCutaneous Continuous Pump  losartan 25 milliGRAM(s) Oral daily  metoprolol tartrate 75 milliGRAM(s) Oral two times a day  sodium chloride 0.9%. 1000 milliLiter(s) (75 mL/Hr) IV Continuous <Continuous>  tacrolimus 1.5 milliGRAM(s) Oral two times a day  trimethoprim   80 mG/sulfamethoxazole 400 mG 1 Tablet(s) Oral daily    MEDICATIONS  (PRN):  dextrose 40% Gel 15 Gram(s) Oral once PRN Blood Glucose LESS THAN 70 milliGRAM(s)/deciliter  dextrose 40% Gel 15 Gram(s) Oral once PRN Blood Glucose LESS THAN 70 milliGRAM(s)/deciliter  glucagon  Injectable 1 milliGRAM(s) IntraMuscular once PRN Glucose LESS THAN 70 milligrams/deciliter  glucagon  Injectable 1 milliGRAM(s) IntraMuscular once PRN Glucose LESS THAN 70 milligrams/deciliter    T(C): 36.4 (08-28-20 @ 04:59), Max: 37 (08-26-20 @ 18:45)  HR: 61 (08-28-20 @ 04:59) (56 - 72)  BP: 127/65 (08-28-20 @ 04:59) (89/48 - 172/80)  RR: 19 (08-28-20 @ 04:59)  SpO2: 96% (08-28-20 @ 04:59)  Wt(kg): --  I&O's Detail    27 Aug 2020 07:01  -  28 Aug 2020 07:00  --------------------------------------------------------  IN:    Oral Fluid: 480 mL    sodium chloride 0.9%.: 825 mL  Total IN: 1305 mL    OUT:    Voided: 1650 mL  Total OUT: 1650 mL    Total NET: -345 mL      28 Aug 2020 07:01  -  28 Aug 2020 10:29  --------------------------------------------------------  IN:  Total IN: 0 mL    OUT:    Voided: 800 mL  Total OUT: 800 mL    Total NET: -800 mL        PHYSICAL EXAM:  General: No distress  Respiratory: b/l air entry  Cardiovascular: S1 S2  Gastrointestinal: soft  Extremities: no edema      LABORATORY:                        13.9   6.76  )-----------( 203      ( 28 Aug 2020 07:50 )             42.3     08-28    141  |  107  |  21  ----------------------------<  80  4.1   |  31  |  1.10    Ca    9.1      28 Aug 2020 07:50    TPro  7.2  /  Alb  3.2<L>  /  TBili  0.5  /  DBili  x   /  AST  13<L>  /  ALT  16  /  AlkPhos  109  08-28    Sodium, Serum: 141 mmol/L (08-28 @ 07:50)  Sodium, Serum: 141 mmol/L (08-27 @ 08:47)  Sodium, Serum: 142 mmol/L (08-26 @ 10:34)    Potassium, Serum: 4.1 mmol/L (08-28 @ 07:50)  Potassium, Serum: 4.2 mmol/L (08-27 @ 08:47)  Potassium, Serum: 4.6 mmol/L (08-26 @ 10:34)    Hemoglobin: 13.9 g/dL (08-28 @ 07:50)  Hemoglobin: 13.2 g/dL (08-27 @ 08:47)  Hemoglobin: 13.8 g/dL (08-26 @ 10:34)    Creatinine, Serum 1.10 (08-28 @ 07:50)  Creatinine, Serum 1.20 (08-27 @ 08:47)  Creatinine, Serum 1.40 (08-26 @ 10:34)        LIVER FUNCTIONS - ( 28 Aug 2020 07:50 )  Alb: 3.2 g/dL / Pro: 7.2 g/dL / ALK PHOS: 109 U/L / ALT: 16 U/L / AST: 13 U/L / GGT: x

## 2020-08-28 NOTE — PROGRESS NOTE ADULT - PROBLEM SELECTOR PLAN 1
pt aware that with bone being exposed if there is not already osteo it will develop. Pt understands role of surgery with nonhealing situation and this is reasonable and warranted in this context. Pending surgery and further micro data recommend:  ceftriaxone 2 grams IV Q-day pending micro and path data.

## 2020-08-28 NOTE — DIETITIAN INITIAL EVALUATION ADULT. - PROBLEM SELECTOR PLAN 2
M[place] CVA (2015) - residual deficits of loss of pain and temperature sensation in left arm and right face  -continue home aspirin 325 mg   -continue home gabapentin for residual neuropathic facial pain

## 2020-08-28 NOTE — PROGRESS NOTE ADULT - PROBLEM SELECTOR PLAN 5
chronic, on insulin pump- Low sugar this AM , IV Fluids.  -Pat Weil diabetes educator to see patient  -Endo Dr. Perlman consulted, recs appreciated; pump settings adjusted for temp basal 70% for next 20 hrs, goal bg 100-180 in hosp setting.  -A1C 8.2, 7.1 in 12/2019  -Nutritional Eval., D/W PAt weil. chronic, on insulin pump- FS q AC HS  -Pat Weil diabetes educator to see patient  -Endo Dr. Perlman consulted, recs appreciated; pump settings adjusted for temp basal 70% for next 20 hrs, goal bg 100-180 in hosp setting.  -A1C 8.2,  was 7.1 in 12/2019  -Nutritional Eval., D/W Pat weil.

## 2020-08-28 NOTE — PROGRESS NOTE ADULT - SUBJECTIVE AND OBJECTIVE BOX
LUTHER NORIEGA  54y  Male      Patient is a 54y old  Male who presents with a chief complaint of r/o osteomyelitis (28 Aug 2020 07:39)      INTERVAL HPI/OVERNIGHT EVENTS:        REVIEW OF SYSTEMS:  CONSTITUTIONAL: No fever, weight loss, or fatigue  EYES: No eye pain, visual disturbances, or discharge  ENMT:  No difficulty hearing, tinnitus, vertigo; No sinus or throat pain  NECK: No pain or stiffness  BREASTS: No pain, masses, or nipple discharge  RESPIRATORY: No cough, wheezing, chills or hemoptysis; No shortness of breath  CARDIOVASCULAR: No chest pain, palpitations, dizziness, or leg swelling  GASTROINTESTINAL: No abdominal or epigastric pain. No nausea, vomiting, or hematemesis; No diarrhea or constipation. No melena or hematochezia.  GENITOURINARY: No dysuria, frequency, hematuria, or incontinence  NEUROLOGICAL: No headaches, memory loss, loss of strength, numbness, or tremors  SKIN: No itching, burning, rashes, or lesions   LYMPH NODES: No enlarged glands  ENDOCRINE: No heat or cold intolerance; No hair loss  MUSCULOSKELETAL: No joint pain or swelling; No muscle, back, or extremity pain  PSYCHIATRIC: No depression, anxiety, mood swings, or difficulty sleeping  HEME/LYMPH: No easy bruising, or bleeding gums  ALLERY AND IMMUNOLOGIC: No hives or eczema    T(C): 36.4 (08-28-20 @ 04:59), Max: 37 (08-27-20 @ 10:15)  HR: 61 (08-28-20 @ 04:59) (57 - 71)  BP: 127/65 (08-28-20 @ 04:59) (89/48 - 154/77)  RR: 19 (08-28-20 @ 04:59) (12 - 19)  SpO2: 96% (08-28-20 @ 04:59) (92% - 99%)  Wt(kg): --Vital Signs Last 24 Hrs  T(C): 36.4 (28 Aug 2020 04:59), Max: 37 (27 Aug 2020 10:15)  T(F): 97.6 (28 Aug 2020 04:59), Max: 98.6 (27 Aug 2020 10:15)  HR: 61 (28 Aug 2020 04:59) (57 - 71)  BP: 127/65 (28 Aug 2020 04:59) (89/48 - 154/77)  BP(mean): --  RR: 19 (28 Aug 2020 04:59) (12 - 19)  SpO2: 96% (28 Aug 2020 04:59) (92% - 99%)    PHYSICAL EXAM:  GENERAL: NAD, well-groomed, well-developed  HEAD:  Atraumatic, Normocephalic  EYES: EOMI, PERRLA, conjunctiva and sclera clear  ENMT: No tonsillar erythema, exudates, or enlargement; Moist mucous membranes, Good dentition, No lesions  NECK: Supple, No JVD, Normal thyroid  NERVOUS SYSTEM:  Alert & Oriented X3, Good concentration; Motor Strength 5/5 B/L upper and lower extremities; DTRs 2+ intact and symmetric  CHEST/LUNG: Clear to percussion bilaterally; No rales, rhonchi, wheezing, or rubs  HEART: Regular rate and rhythm; No murmurs, rubs, or gallops  ABDOMEN: Soft, Nontender, Nondistended; Bowel sounds present  EXTREMITIES:  2+ Peripheral Pulses, No clubbing, cyanosis, or edema  LYMPH: No lymphadenopathy noted  SKIN: No rashes or lesions    Consultant(s) Notes Reviewed:  [x ] YES  [ ] NO  Care Discussed with Consultants/Other Providers [ x] YES  [ ] NO    LABS:                        13.9   6.76  )-----------( 203      ( 28 Aug 2020 07:50 )             42.3     08-28    141  |  107  |  21  ----------------------------<  80  4.1   |  31  |  1.10    Ca    9.1      28 Aug 2020 07:50    TPro  7.2  /  Alb  3.2<L>  /  TBili  0.5  /  DBili  x   /  AST  13<L>  /  ALT  16  /  AlkPhos  109  08-28        CAPILLARY BLOOD GLUCOSE      POCT Blood Glucose.: 84 mg/dL (28 Aug 2020 07:35)  POCT Blood Glucose.: 131 mg/dL (27 Aug 2020 21:16)  POCT Blood Glucose.: 106 mg/dL (27 Aug 2020 17:09)  POCT Blood Glucose.: 119 mg/dL (27 Aug 2020 15:16)  POCT Blood Glucose.: 160 mg/dL (27 Aug 2020 14:15)  POCT Blood Glucose.: 119 mg/dL (27 Aug 2020 13:34)  POCT Blood Glucose.: 111 mg/dL (27 Aug 2020 12:19)  POCT Blood Glucose.: 84 mg/dL (27 Aug 2020 10:44)            RADIOLOGY & ADDITIONAL TESTS:    Imaging Personally Reviewed:  [ ] YES  [ ] NO    HEALTH ISSUES - PROBLEM Dx:  Type 1 diabetes mellitus with stage 5 chronic kidney disease not on chronic dialysis: Type 1 diabetes mellitus with stage 5 chronic kidney disease not on chronic dialysis  Osteomyelitis: Osteomyelitis  Need for prophylactic measure: Need for prophylactic measure  Hypertension: Hypertension  Hyperlipidemia: Hyperlipidemia  Diabetes mellitus: Diabetes mellitus  Renal transplant recipient: Renal transplant recipient  CKD (chronic kidney disease): CKD (chronic kidney disease)  Diabetic peripheral neuropathy: Diabetic peripheral neuropathy  CVA (cerebral vascular accident): CVA (cerebral vascular accident)  Diabetic ulcer of toe: Diabetic ulcer of toe  Diabetic ulcer of toe of left foot: Diabetic ulcer of toe of left foot LUTHER NORIEGA  54y  Male      Patient is a 54y old  Male who presents with a chief complaint of diabetic foot ulcers, admitted to r/o osteomyelitis    55 yo M with PMHx of LDRT / right renal transplant for CKD (2013), T1DM on insulin pump, HTN, HLD, Wallengberg CVA (2015) - residual deficits of loss of pain and temperature sensation in left arm and right face, hx of DVT, 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 Noticed the wounds to the distal L 3rd digit about 2 months ago. Patient has been applying collagenase but showed no improvement. Has been seeing a podiatrist outpatient but wounds are not healing so was sent to wound care for additional management. Sent in by Dr. Araiza today to r/o osteomyelitis. & Possible surgery/amputation of left foot 3rd toe distal digit & Rt foot 2nd toe distal digit . Patient also has a burn rash on his chest that he acquired while cooking with oil 3 weeks ago. Scheduled for MRI of left foot but patient states he is unable to have MRI due to having metal coils in his left inner ear. Denies fevers, chills, SOB, chest pain, pain in distal digital wounds.    In the ED, VS T98.8F HR 64 /71 RR 18 SpO2 94% on RA. Labs significant for mildly elevated ESR 25, normal WBC 7.44, BUN/Cr 26/1.40,   Received Vanco and Zosyn x1 in ED.  CXR showing mildly elevated R hemidiaphragm. No focal consolidation.  EKG showing 1st degree AV block  -Pt seen by ID Dr Saucedo, On IV Abx       INTERVAL HPI  8/28/20: Patient seen and examined at bedside day 1 s/p partial amputation of R 2nd toe and L 3rd toe. No acute over night events. Patient admits to resting in bed and "staying off his feet" since the surgery. Patient has been using incentive spirometry at short intervals as well as urination in a urinal. Patient denies any bowel movements since yesterday but admits to passage of gas. Patient  denies any foot pain, extremity tingling, changes in vision, chest pain, SOB, palpitations, nausea, vomiting, abdominal pain, or hematuria. Patient had no difficulty with sleep or pain over night.     8/27/20: Patient seen and examined at bedside. No acute overnight events. Patient denies any foot pain, extremity tingling, changes in vision, chest pain, SOB, palpitations, nausea, vomiting, hematuria, urinary incontinence, or blood in stools. Patient says he was able to sleep well overnight as well as ambulate to use the bathroom. Pt is currently awaiting surgery for the amputation of L 3rd digit and R 2nd digit. On IV Abx     OVERNIGHT EVENTS: none     Home Medications:  aspirin 325 mg oral tablet: 1 tab(s) orally once a day (26 Aug 2020 12:06)  Cozaar 25 mg oral tablet: 1 tab(s) orally once a day (26 Aug 2020 12:06)  famotidine 20 mg oral tablet: 1 tab(s) orally once a day (26 Aug 2020 12:06)  gabapentin 300 mg oral capsule: 1 cap(s) orally 2 times a day (26 Aug 2020 12:06)  lovastatin 40 mg oral tablet: 1 tab(s) orally once a day (26 Aug 2020 12:06)  Metoprolol Tartrate 25 mg oral tablet: 3 tab(s) orally 2 times a day    *conf. with patient and pharmacy. 75mg dose, BID* (26 Aug 2020 12:06)  tacrolimus 0.5 mg oral capsule: 3 cap(s) orally 2 times a day (26 Aug 2020 12:06)      MEDICATIONS  (STANDING):  aspirin 325 milliGRAM(s) Oral daily  atorvastatin 10 milliGRAM(s) Oral at bedtime  azaTHIOprine 100 milliGRAM(s) Oral daily  cefTRIAXone   IVPB 2000 milliGRAM(s) IV Intermittent every 24 hours  cloNIDine 0.1 milliGRAM(s) Oral every 12 hours  dextrose 5%. 1000 milliLiter(s) (50 mL/Hr) IV Continuous <Continuous>  dextrose 5%. 1000 milliLiter(s) (50 mL/Hr) IV Continuous <Continuous>  dextrose 50% Injectable 12.5 Gram(s) IV Push once  dextrose 50% Injectable 25 Gram(s) IV Push once  dextrose 50% Injectable 25 Gram(s) IV Push once  famotidine    Tablet 20 milliGRAM(s) Oral daily  gabapentin 300 milliGRAM(s) Oral two times a day  hydrALAZINE 25 milliGRAM(s) Oral every 8 hours  insulin lispro (HumaLOG) Pump 1 Each SubCutaneous Continuous Pump  losartan 25 milliGRAM(s) Oral daily  metoprolol tartrate 75 milliGRAM(s) Oral two times a day  tacrolimus 1.5 milliGRAM(s) Oral two times a day  trimethoprim   80 mG/sulfamethoxazole 400 mG 1 Tablet(s) Oral daily    MEDICATIONS  (PRN):  dextrose 40% Gel 15 Gram(s) Oral once PRN Blood Glucose LESS THAN 70 milliGRAM(s)/deciliter  glucagon  Injectable 1 milliGRAM(s) IntraMuscular once PRN Glucose LESS THAN 70 milligrams/deciliter      No Known Allergies      Social History:  Never a smoker, drinks ETOH once every few weeks, denies any illicit drugs  independent in ADLs, walks without assistance, lives at home with wife (26 Aug 2020 12:52)    REVIEW OF SYSTEMS:  CONSTITUTIONAL: No fever  EYES: No eye pain or visual disturbances  RESPIRATORY: No SOB, cough, wheezing, or chills  CARDIOVASCULAR: No chest pain, palpitations, dizziness, or edema  GASTROINTESTINAL: No abdominal or epigastric pain. No nausea, vomiting, or hematemesis; No diarrhea, constipation. No bowel movements   GENITOURINARY: No hematuria  NEUROLOGICAL: No foot numbness, tingling, or headaches   ENDOCRINE: No heat or cold intolerance  MUSCULOSKELETAL: No joint pain or swelling; No muscle, back, or extremity pain    T(C): 36.4 (08-28-20 @ 04:59), Max: 37 (08-27-20 @ 10:15)  HR: 61 (08-28-20 @ 04:59) (57 - 71)  BP: 127/65 (08-28-20 @ 04:59) (89/48 - 154/77)  RR: 19 (08-28-20 @ 04:59) (12 - 19)  SpO2: 96% (08-28-20 @ 04:59) (92% - 99%)  Wt(kg): --Vital Signs Last 24 Hrs  T(C): 36.4 (28 Aug 2020 04:59), Max: 37 (27 Aug 2020 10:15)  T(F): 97.6 (28 Aug 2020 04:59), Max: 98.6 (27 Aug 2020 10:15)  HR: 61 (28 Aug 2020 04:59) (57 - 71)  BP: 127/65 (28 Aug 2020 04:59) (89/48 - 154/77)  BP(mean): --  RR: 19 (28 Aug 2020 04:59) (12 - 19)  SpO2: 96% (28 Aug 2020 04:59) (92% - 99%)    PHYSICAL EXAM:  GENERAL: NAD, well-groomed, well-developed  HEAD:  Atraumatic, Normocephalic  EYES: EOMI, PERRLA, conjunctiva and sclera clear  ENMT: No tonsillar erythema, exudates, or enlargement; Moist mucous membranes, Good dentition  NERVOUS SYSTEM:  Alert & Oriented X3, Good concentration;   CHEST/LUNG: Clear to percussion bilaterally; No rales, rhonchi, wheezing, or rubs  HEART: Regular rate and rhythm; No murmurs, rubs, or gallops  ABDOMEN: Soft, Nontender, Nondistended; Bowel sounds present  EXTREMITIES:  2+ Peripheral Pulses, No clubbing, cyanosis, or edema  SKIN: R lower extremity abrasion     Consultant(s) Notes Reviewed:  [x ] YES  [ ] NO  Care Discussed with Consultants/Other Providers [ x] YES  [ ] NO    LABS:                        13.9   6.76  )-----------( 203      ( 28 Aug 2020 07:50 )             42.3     08-28    141  |  107  |  21  ----------------------------<  80  4.1   |  31  |  1.10    Ca    9.1      28 Aug 2020 07:50    TPro  7.2  /  Alb  3.2<L>  /  TBili  0.5  /  DBili  x   /  AST  13<L>  /  ALT  16  /  AlkPhos  109  08-28        CAPILLARY BLOOD GLUCOSE      POCT Blood Glucose.: 84 mg/dL (28 Aug 2020 07:35)  POCT Blood Glucose.: 131 mg/dL (27 Aug 2020 21:16)  POCT Blood Glucose.: 106 mg/dL (27 Aug 2020 17:09)  POCT Blood Glucose.: 119 mg/dL (27 Aug 2020 15:16)  POCT Blood Glucose.: 160 mg/dL (27 Aug 2020 14:15)  POCT Blood Glucose.: 119 mg/dL (27 Aug 2020 13:34)  POCT Blood Glucose.: 111 mg/dL (27 Aug 2020 12:19)  POCT Blood Glucose.: 84 mg/dL (27 Aug 2020 10:44)            RADIOLOGY & ADDITIONAL TESTS:    Imaging Personally Reviewed:  [x ] YES  [ ] NO    HEALTH ISSUES - PROBLEM Dx:  Type 1 diabetes mellitus with stage 5 chronic kidney disease not on chronic dialysis: Type 1 diabetes mellitus with stage 5 chronic kidney disease not on chronic dialysis  Osteomyelitis: Osteomyelitis  Need for prophylactic measure: Need for prophylactic measure  Hypertension: Hypertension  Hyperlipidemia: Hyperlipidemia  Diabetes mellitus: Diabetes mellitus  Renal transplant recipient: Renal transplant recipient  CKD (chronic kidney disease): CKD (chronic kidney disease)  Diabetic peripheral neuropathy: Diabetic peripheral neuropathy  CVA (cerebral vascular accident): CVA (cerebral vascular accident)  Diabetic ulcer of toe: Diabetic ulcer of toe  Diabetic ulcer of toe of left foot: Diabetic ulcer of toe of left foot LUTHER NORIEGA  54y  Male      Patient is a 54y old  Male who presents with a chief complaint of diabetic foot ulcers, admitted to r/o osteomyelitis    53 yo M with PMHx of LDRT / right renal transplant for CKD (2013), T1DM on insulin pump, HTN, HLD, [place] CVA (2015) - residual deficits of loss of pain and temperature sensation in left arm and right face, hx of DVT, 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 Noticed the wounds to the distal L 3rd digit about 2 months ago. Patient has been applying collagenase but showed no improvement. Has been seeing a podiatrist outpatient but wounds are not healing so was sent to wound care for additional management. Sent in by Dr. Araiza today to r/o osteomyelitis. & Possible surgery/amputation of left foot 3rd toe distal digit & Rt foot 2nd toe distal digit . Patient also has a burn rash on his chest that he acquired while cooking with oil 3 weeks ago. Scheduled for MRI of left foot but patient states he is unable to have MRI due to having metal coils in his left inner ear. Denies fevers, chills, SOB, chest pain, pain in distal digital wounds.    In the ED, VS T98.8F HR 64 /71 RR 18 SpO2 94% on RA. Labs significant for mildly elevated ESR 25, normal WBC 7.44, BUN/Cr 26/1.40,   Received Vanco and Zosyn x1 in ED.  CXR showing mildly elevated R hemidiaphragm. No focal consolidation.  EKG showing 1st degree AV block  -Pt seen by ID Dr Saucedo, On IV Abx       INTERVAL HPI  8/27/20: Patient seen and examined at bedside. No acute overnight events. Patient denies any foot pain, extremity tingling, changes in vision, chest pain, SOB, palpitations, nausea, vomiting, hematuria, urinary incontinence, or blood in stools. Patient says he was able to sleep well overnight as well as ambulate to use the bathroom. Pt is currently awaiting surgery for the amputation of L 3rd digit and R 2nd digit. On IV Abx     8/28/20: Patient seen and examined at bedside POD 1 s/p partial amputation of R 2nd toe and L 3rd toe. No acute over night events. Patient admits to resting in bed and "staying off his feet" since the surgery. Patient has been using incentive spirometry at short intervals as well as urination in a urinal. Patient denies any bowel movements since yesterday but admits to passage of gas. Patient  denies any foot pain, extremity tingling, changes in vision, chest pain, SOB, palpitations, nausea, vomiting, abdominal pain, or hematuria. Patient had no difficulty with sleep or pain over night. No acute complaints.      OVERNIGHT EVENTS: none     Home Medications:  aspirin 325 mg oral tablet: 1 tab(s) orally once a day (26 Aug 2020 12:06)  Cozaar 25 mg oral tablet: 1 tab(s) orally once a day (26 Aug 2020 12:06)  famotidine 20 mg oral tablet: 1 tab(s) orally once a day (26 Aug 2020 12:06)  gabapentin 300 mg oral capsule: 1 cap(s) orally 2 times a day (26 Aug 2020 12:06)  lovastatin 40 mg oral tablet: 1 tab(s) orally once a day (26 Aug 2020 12:06)  Metoprolol Tartrate 25 mg oral tablet: 3 tab(s) orally 2 times a day    *conf. with patient and pharmacy. 75mg dose, BID* (26 Aug 2020 12:06)  tacrolimus 0.5 mg oral capsule: 3 cap(s) orally 2 times a day (26 Aug 2020 12:06)      MEDICATIONS  (STANDING):  aspirin 325 milliGRAM(s) Oral daily  atorvastatin 10 milliGRAM(s) Oral at bedtime  azaTHIOprine 100 milliGRAM(s) Oral daily  cefTRIAXone   IVPB 2000 milliGRAM(s) IV Intermittent every 24 hours  cloNIDine 0.1 milliGRAM(s) Oral every 12 hours  dextrose 5%. 1000 milliLiter(s) (50 mL/Hr) IV Continuous <Continuous>  dextrose 5%. 1000 milliLiter(s) (50 mL/Hr) IV Continuous <Continuous>  dextrose 50% Injectable 12.5 Gram(s) IV Push once  dextrose 50% Injectable 25 Gram(s) IV Push once  dextrose 50% Injectable 25 Gram(s) IV Push once  famotidine    Tablet 20 milliGRAM(s) Oral daily  gabapentin 300 milliGRAM(s) Oral two times a day  hydrALAZINE 25 milliGRAM(s) Oral every 8 hours  insulin lispro (HumaLOG) Pump 1 Each SubCutaneous Continuous Pump  losartan 25 milliGRAM(s) Oral daily  metoprolol tartrate 75 milliGRAM(s) Oral two times a day  tacrolimus 1.5 milliGRAM(s) Oral two times a day  trimethoprim   80 mG/sulfamethoxazole 400 mG 1 Tablet(s) Oral daily    MEDICATIONS  (PRN):  dextrose 40% Gel 15 Gram(s) Oral once PRN Blood Glucose LESS THAN 70 milliGRAM(s)/deciliter  glucagon  Injectable 1 milliGRAM(s) IntraMuscular once PRN Glucose LESS THAN 70 milligrams/deciliter      No Known Allergies      Social History:  Never a smoker, drinks ETOH once every few weeks, denies any illicit drugs  independent in ADLs, walks without assistance, lives at home with wife (26 Aug 2020 12:52)    REVIEW OF SYSTEMS:  CONSTITUTIONAL: No fever  EYES: No eye pain or visual disturbances  RESPIRATORY: No SOB, cough, wheezing, or chills  CARDIOVASCULAR: No chest pain, palpitations, dizziness, or edema  GASTROINTESTINAL: No abdominal or epigastric pain. No nausea, vomiting, or hematemesis; No diarrhea, constipation. No bowel movements   GENITOURINARY: No hematuria  NEUROLOGICAL: No foot numbness, tingling, or headaches   ENDOCRINE: No heat or cold intolerance  MUSCULOSKELETAL: No joint pain or swelling; No muscle, back, or extremity pain    T(C): 36.4 (08-28-20 @ 04:59), Max: 37 (08-27-20 @ 10:15)  HR: 61 (08-28-20 @ 04:59) (57 - 71)  BP: 127/65 (08-28-20 @ 04:59) (89/48 - 154/77)  RR: 19 (08-28-20 @ 04:59) (12 - 19)  SpO2: 96% (08-28-20 @ 04:59) (92% - 99%)  Wt(kg): --Vital Signs Last 24 Hrs  T(C): 36.4 (28 Aug 2020 04:59), Max: 37 (27 Aug 2020 10:15)  T(F): 97.6 (28 Aug 2020 04:59), Max: 98.6 (27 Aug 2020 10:15)  HR: 61 (28 Aug 2020 04:59) (57 - 71)  BP: 127/65 (28 Aug 2020 04:59) (89/48 - 154/77)  BP(mean): --  RR: 19 (28 Aug 2020 04:59) (12 - 19)  SpO2: 96% (28 Aug 2020 04:59) (92% - 99%)    PHYSICAL EXAM:  GENERAL: NAD, well-groomed, well-developed  HEAD:  Atraumatic, Normocephalic  EYES: EOMI, PERRLA, conjunctiva and sclera clear  ENMT: No tonsillar erythema, exudates, or enlargement; Moist mucous membranes, Good dentition  NERVOUS SYSTEM:  Alert & Oriented X3, Good concentration;   CHEST/LUNG: Clear to percussion bilaterally; No rales, rhonchi, wheezing, or rubs  HEART: Regular rate and rhythm; No murmurs, rubs, or gallops  ABDOMEN: Soft, Nontender, Nondistended; Bowel sounds present  EXTREMITIES:  2+ Peripheral Pulses, No clubbing, cyanosis, or edema  SKIN: R lower extremity abrasion     Consultant(s) Notes Reviewed:  [x ] YES  [ ] NO  Care Discussed with Consultants/Other Providers [ x] YES  [ ] NO    LABS:                        13.9   6.76  )-----------( 203      ( 28 Aug 2020 07:50 )             42.3     08-28    141  |  107  |  21  ----------------------------<  80  4.1   |  31  |  1.10    Ca    9.1      28 Aug 2020 07:50    TPro  7.2  /  Alb  3.2<L>  /  TBili  0.5  /  DBili  x   /  AST  13<L>  /  ALT  16  /  AlkPhos  109  08-28        CAPILLARY BLOOD GLUCOSE      POCT Blood Glucose.: 84 mg/dL (28 Aug 2020 07:35)  POCT Blood Glucose.: 131 mg/dL (27 Aug 2020 21:16)  POCT Blood Glucose.: 106 mg/dL (27 Aug 2020 17:09)  POCT Blood Glucose.: 119 mg/dL (27 Aug 2020 15:16)  POCT Blood Glucose.: 160 mg/dL (27 Aug 2020 14:15)  POCT Blood Glucose.: 119 mg/dL (27 Aug 2020 13:34)  POCT Blood Glucose.: 111 mg/dL (27 Aug 2020 12:19)  POCT Blood Glucose.: 84 mg/dL (27 Aug 2020 10:44)            RADIOLOGY & ADDITIONAL TESTS:    Imaging Personally Reviewed:  [x ] YES  [ ] NO    HEALTH ISSUES - PROBLEM Dx:  Type 1 diabetes mellitus with stage 5 chronic kidney disease not on chronic dialysis: Type 1 diabetes mellitus with stage 5 chronic kidney disease not on chronic dialysis  Osteomyelitis: Osteomyelitis  Need for prophylactic measure: Need for prophylactic measure  Hypertension: Hypertension  Hyperlipidemia: Hyperlipidemia  Diabetes mellitus: Diabetes mellitus  Renal transplant recipient: Renal transplant recipient  CKD (chronic kidney disease): CKD (chronic kidney disease)  Diabetic peripheral neuropathy: Diabetic peripheral neuropathy  CVA (cerebral vascular accident): CVA (cerebral vascular accident)  Diabetic ulcer of toe: Diabetic ulcer of toe  Diabetic ulcer of toe of left foot: Diabetic ulcer of toe of left foot Patient is a 54y old  Male who presents with a chief complaint of r/o osteomyelitis (28 Aug 2020 12:20)      INTERVAL HPI: 8/27/20: Patient seen and examined at bedside. No acute overnight events. Patient denies any foot pain, extremity tingling, changes in vision, chest pain, SOB, palpitations, nausea, vomiting, hematuria, urinary incontinence, or blood in stools. Patient says he was able to sleep well overnight as well as ambulate to use the bathroom. Pt is currently awaiting surgery for the amputation of L 3rd digit and R 2nd digit. On IV Abx     8/28/20: Patient seen and examined at bedside POD 1 s/p partial amputation of R 2nd toe and L 3rd toe. No acute over night events. Patient admits to resting in bed and "staying off his feet" since the surgery. Patient has been using incentive spirometry at short intervals as well as urination in a urinal. Patient denies any bowel movements since yesterday but admits to passage of gas. Patient  denies any foot pain, extremity tingling, changes in vision, chest pain, SOB, palpitations, nausea, vomiting, abdominal pain, or hematuria. Patient had no difficulty with sleep or pain over night. No acute complaints.        OVERNIGHT EVENTS: none    Home Medications:  aspirin 325 mg oral tablet: 1 tab(s) orally once a day (26 Aug 2020 12:06)  Cozaar 25 mg oral tablet: 1 tab(s) orally once a day (26 Aug 2020 12:06)  famotidine 20 mg oral tablet: 1 tab(s) orally once a day (26 Aug 2020 12:06)  gabapentin 300 mg oral capsule: 1 cap(s) orally 2 times a day (26 Aug 2020 12:06)  lovastatin 40 mg oral tablet: 1 tab(s) orally once a day (26 Aug 2020 12:06)  Metoprolol Tartrate 25 mg oral tablet: 3 tab(s) orally 2 times a day    *conf. with patient and pharmacy. 75mg dose, BID* (26 Aug 2020 12:06)  tacrolimus 0.5 mg oral capsule: 3 cap(s) orally 2 times a day (26 Aug 2020 12:06)      MEDICATIONS  (STANDING):  aspirin 325 milliGRAM(s) Oral daily  atorvastatin 10 milliGRAM(s) Oral at bedtime  azaTHIOprine 100 milliGRAM(s) Oral daily  cefTRIAXone   IVPB 2000 milliGRAM(s) IV Intermittent every 24 hours  cloNIDine 0.1 milliGRAM(s) Oral every 12 hours  dextrose 5%. 1000 milliLiter(s) (50 mL/Hr) IV Continuous <Continuous>  dextrose 5%. 1000 milliLiter(s) (50 mL/Hr) IV Continuous <Continuous>  dextrose 50% Injectable 12.5 Gram(s) IV Push once  dextrose 50% Injectable 25 Gram(s) IV Push once  dextrose 50% Injectable 25 Gram(s) IV Push once  dextrose 50% Injectable 12.5 Gram(s) IV Push once  dextrose 50% Injectable 25 Gram(s) IV Push once  dextrose 50% Injectable 25 Gram(s) IV Push once  famotidine    Tablet 20 milliGRAM(s) Oral daily  gabapentin 300 milliGRAM(s) Oral two times a day  heparin   Injectable 5000 Unit(s) SubCutaneous every 8 hours  hydrALAZINE 25 milliGRAM(s) Oral every 8 hours  insulin lispro (HumaLOG) Pump 1 Each SubCutaneous Continuous Pump  losartan 25 milliGRAM(s) Oral daily  metoprolol tartrate 75 milliGRAM(s) Oral two times a day  tacrolimus 1.5 milliGRAM(s) Oral two times a day  trimethoprim   80 mG/sulfamethoxazole 400 mG 1 Tablet(s) Oral daily    MEDICATIONS  (PRN):  dextrose 40% Gel 15 Gram(s) Oral once PRN Blood Glucose LESS THAN 70 milliGRAM(s)/deciliter  dextrose 40% Gel 15 Gram(s) Oral once PRN Blood Glucose LESS THAN 70 milliGRAM(s)/deciliter  glucagon  Injectable 1 milliGRAM(s) IntraMuscular once PRN Glucose LESS THAN 70 milligrams/deciliter  glucagon  Injectable 1 milliGRAM(s) IntraMuscular once PRN Glucose LESS THAN 70 milligrams/deciliter      Allergies    No Known Allergies    Intolerances        REVIEW OF SYSTEMS:  CONSTITUTIONAL: No fever, No chills, No fatigue, No myalgia, No Body ache, No Weakness  EYES: No eye pain,  No visual disturbances, No discharge, NO Redness  ENMT:  No ear pain, No nose bleed, No vertigo; No sinus or throat pain, No Congestion  NECK: No pain, No stiffness  RESPIRATORY: No cough, wheezing, No  hemoptysis, No shortness of breath  CARDIOVASCULAR: No chest pain, palpitations  GASTROINTESTINAL: No abdominal or epigastric pain. No nausea, No vomiting; No diarrhea or constipation. [  ] BM  GENITOURINARY: No dysuria, No frequency, No urgency, No hematuria, or incontinence  NEUROLOGICAL: +decreased sensation in left extremities, No headaches, No dizziness, No tingling, No tremors, No weakness  EXTREMITIES: +swelling, denies Pain  SKIN: + ulcers in both legs, +dry skin, +redness  MUSCULOSKELETAL: No joint pain or swelling; No muscle pain, No back pain, No extremity pain  PSYCHIATRIC: No depression, anxiety, mood swings or difficulty sleeping at night  PAIN SCALE: [ x ] None  [  ] Other-  ROS Unable to obtain due to - [  ] Dementia  [  ] Lethargy  [  ] Sedated [  ] non verbal  REST OF REVIEW Of SYSTEM - [ x ] Normal     Vital Signs Last 24 Hrs  T(C): 36.4 (28 Aug 2020 04:59), Max: 36.7 (27 Aug 2020 14:10)  T(F): 97.6 (28 Aug 2020 04:59), Max: 98.1 (27 Aug 2020 14:10)  HR: 100 (28 Aug 2020 12:51) (57 - 100)  BP: 138/71 (28 Aug 2020 12:51) (89/48 - 154/70)  BP(mean): --  RR: 19 (28 Aug 2020 04:59) (12 - 19)  SpO2: 96% (28 Aug 2020 04:59) (92% - 99%)  Finger Stick        08-27 @ 07:01  -  08-28 @ 07:00  --------------------------------------------------------  IN: 1305 mL / OUT: 1650 mL / NET: -345 mL    08-28 @ 07:01 - 08-28 @ 13:23  --------------------------------------------------------  IN: 0 mL / OUT: 800 mL / NET: -800 mL        PHYSICAL EXAM:  GENERAL:  [x  ] NAD, [ x ] Well appearing, [  ] Agitated, [  ] Drowsy, [  ] Lethargy, [  ] Confused   HEAD:  [x  ] Normal, [  ] Other  EYES:  [x  ] EOMI, [x  ] PERRLA, [x  ] Conjunctiva and sclera clear normal, [  ] Other, [  ] Pallor, [  ] Discharge  ENMT:  [ x ] Normal, [x  ] Moist mucous membranes, [x  ] Good dentition, [ x ] No thrush  NECK:  [ x ] Supple, [x  ] No JVD, [ x ] Normal thyroid, [  ] Lymphadenopathy, [  ] Other  CHEST/LUNG:  [ x ] Clear to auscultation bilaterally, [ x ] Breath Sounds equal B/L / decreased, [  ] Poor effort, [ x ] No rales, [ x ] No rhonchi, [x  ] No wheezing  HEART:  [ x ] Regular rate and rhythm, [  ] Tachycardia, [ x ] Bradycardia, [  ] Irregular, [ x ] No murmurs, No rubs, No gallops, [  ] PPM in place (Mfr:  )  ABDOMEN:  [ x ] Soft, [ x ] Nontender, [x  ] Nondistended, [x  ] No mass, [ + ] Bowel sounds present, [+  ] Obese  NERVOUS SYSTEM:  [ x ] Alert & Oriented x3, [  ] Nonfocal, [  ] Confusion, [  ] Encephalopathic, [  ] Sedated, [  ] Unable to assess, [  ] Dementia, [  ] Other-  EXTREMITIES:  [ x ] 2+ Peripheral Pulses, No clubbing, No cyanosis,  [ x ] +2 Edema B/L lower EXT R>L, chronic [ x ] PVD stasis skin changes B/L lower EXT, [ x ] Wound,  dressing around the L 3rd digit and R 2nd digit; partial amputation of L 2nd digit  LYMPH:  No lymphadenopathy noted  SKIN:  Lesion on R lower extremity, [ x ] Pressure ulcers, [ x ] Abrasion rt lower leg Ecchymosis, [  ] Skin tears, [x  ] Other      DIET: Regular diet    LABS:                        13.9   6.76  )-----------( 203      ( 28 Aug 2020 07:50 )             42.3     28 Aug 2020 07:50    141    |  107    |  21     ----------------------------<  80     4.1     |  31     |  1.10     Ca    9.1        28 Aug 2020 07:50    TPro  7.2    /  Alb  3.2    /  TBili  0.5    /  DBili  x      /  AST  13     /  ALT  16     /  AlkPhos  109    28 Aug 2020 07:50          Culture Results:   No growth to date. (08-26 @ 16:46)  Culture Results:   No growth to date. (08-26 @ 16:46)      culture blood  -- .Tissue Other, distal right 2nd toe 08-27 @ 22:01    culture urine  --  08-27 @ 22:01  culture blood  -- .Blood Blood 08-26 @ 16:46    culture urine  --  08-26 @ 16:46              Culture - Tissue with Gram Stain (collected 27 Aug 2020 22:01)  Source: .Tissue Other, distal left 3rd toe  Gram Stain (27 Aug 2020 23:26):    No polymorphonuclear cells seen per low power field    No organisms seen per oil power field    Culture - Tissue with Gram Stain (collected 27 Aug 2020 22:01)  Source: .Tissue Other, distal right 2nd toe  Gram Stain (27 Aug 2020 23:26):    No polymorphonuclear cells seen per low power field    No organisms seen per oil power field    Culture - Blood (collected 26 Aug 2020 16:46)  Source: .Blood Blood  Preliminary Report (27 Aug 2020 17:01):    No growth to date.    Culture - Blood (collected 26 Aug 2020 16:46)  Source: .Blood Blood  Preliminary Report (27 Aug 2020 17:01):    No growth to date.         RADIOLOGY & ADDITIONAL TESTS: NONE      HEALTH ISSUES - PROBLEM Dx:  Type 1 diabetes mellitus with stage 5 chronic kidney disease not on chronic dialysis: Type 1 diabetes mellitus with stage 5 chronic kidney disease not on chronic dialysis  Osteomyelitis: Osteomyelitis  Need for prophylactic measure: Need for prophylactic measure  Hypertension: Hypertension  Hyperlipidemia: Hyperlipidemia  Diabetes mellitus: Diabetes mellitus  Renal transplant recipient: Renal transplant recipient  CKD (chronic kidney disease): CKD (chronic kidney disease)  Diabetic peripheral neuropathy: Diabetic peripheral neuropathy  CVA (cerebral vascular accident): CVA (cerebral vascular accident)  Diabetic ulcer of toe: Diabetic ulcer of toe  Diabetic ulcer of toe of left foot: Diabetic ulcer of toe of left foot          Consultant(s) Notes Reviewed:  [ x ] YES     Care Discussed with [X] Consultants  [ x ] Patient  [  ] Family  [  ]   [  ] Social Service  [  ] RN, [  ] Physical Therapy  DVT PPX: [  ] Lovenox, [ x ] S C Heparin, [  ] Coumadin, [  ] Xarelto, [  ] Eliquis, [  ] Pradaxa, [  ] IV Heparin drip, [  ] SCD [  ] Contraindication 2 to GI Bleed,[  ] Ambulation  Advanced directive: [ x ] None, [  ] DNR/DNI Patient is a 54y old  Male who presents with a chief complaint of r/o osteomyelitis (28 Aug 2020 12:20)      INTERVAL HPI: 8/27/20: Patient seen and examined at bedside. No acute overnight events. Patient denies any foot pain, extremity tingling, changes in vision, chest pain, SOB, palpitations, nausea, vomiting, hematuria, urinary incontinence, or blood in stools. Patient says he was able to sleep well overnight as well as ambulate to use the bathroom. Pt is currently awaiting surgery for the amputation of L 3rd digit and R 2nd digit. On IV Abx     8/28/20: Patient seen and examined at bedside POD 1 s/p partial amputation of R 2nd toe and L 3rd toe. No acute over night events. Patient admits to resting in bed and "staying off his feet" since the surgery. Patient has been using incentive spirometry at short intervals as well as urination in a urinal. Patient denies any bowel movements since yesterday but admits to passage of gas. Patient  denies any foot pain, extremity tingling, changes in vision, chest pain, SOB, palpitations, nausea, vomiting, abdominal pain, or hematuria. Patient had no difficulty with sleep or pain over night. No acute complaints. PT eval .  OVERNIGHT EVENTS: none    Home Medications:  aspirin 325 mg oral tablet: 1 tab(s) orally once a day (26 Aug 2020 12:06)  Cozaar 25 mg oral tablet: 1 tab(s) orally once a day (26 Aug 2020 12:06)  famotidine 20 mg oral tablet: 1 tab(s) orally once a day (26 Aug 2020 12:06)  gabapentin 300 mg oral capsule: 1 cap(s) orally 2 times a day (26 Aug 2020 12:06)  lovastatin 40 mg oral tablet: 1 tab(s) orally once a day (26 Aug 2020 12:06)  Metoprolol Tartrate 25 mg oral tablet: 3 tab(s) orally 2 times a day    *conf. with patient and pharmacy. 75mg dose, BID* (26 Aug 2020 12:06)  tacrolimus 0.5 mg oral capsule: 3 cap(s) orally 2 times a day (26 Aug 2020 12:06)      MEDICATIONS  (STANDING):  aspirin 325 milliGRAM(s) Oral daily  atorvastatin 10 milliGRAM(s) Oral at bedtime  azaTHIOprine 100 milliGRAM(s) Oral daily  cefTRIAXone   IVPB 2000 milliGRAM(s) IV Intermittent every 24 hours  cloNIDine 0.1 milliGRAM(s) Oral every 12 hours  dextrose 5%. 1000 milliLiter(s) (50 mL/Hr) IV Continuous <Continuous>  dextrose 5%. 1000 milliLiter(s) (50 mL/Hr) IV Continuous <Continuous>  dextrose 50% Injectable 12.5 Gram(s) IV Push once  dextrose 50% Injectable 25 Gram(s) IV Push once  dextrose 50% Injectable 25 Gram(s) IV Push once  dextrose 50% Injectable 12.5 Gram(s) IV Push once  dextrose 50% Injectable 25 Gram(s) IV Push once  dextrose 50% Injectable 25 Gram(s) IV Push once  famotidine    Tablet 20 milliGRAM(s) Oral daily  gabapentin 300 milliGRAM(s) Oral two times a day  heparin   Injectable 5000 Unit(s) SubCutaneous every 8 hours  hydrALAZINE 25 milliGRAM(s) Oral every 8 hours  insulin lispro (HumaLOG) Pump 1 Each SubCutaneous Continuous Pump  losartan 25 milliGRAM(s) Oral daily  metoprolol tartrate 75 milliGRAM(s) Oral two times a day  tacrolimus 1.5 milliGRAM(s) Oral two times a day  trimethoprim   80 mG/sulfamethoxazole 400 mG 1 Tablet(s) Oral daily    MEDICATIONS  (PRN):  dextrose 40% Gel 15 Gram(s) Oral once PRN Blood Glucose LESS THAN 70 milliGRAM(s)/deciliter  dextrose 40% Gel 15 Gram(s) Oral once PRN Blood Glucose LESS THAN 70 milliGRAM(s)/deciliter  glucagon  Injectable 1 milliGRAM(s) IntraMuscular once PRN Glucose LESS THAN 70 milligrams/deciliter  glucagon  Injectable 1 milliGRAM(s) IntraMuscular once PRN Glucose LESS THAN 70 milligrams/deciliter      Allergies    No Known Allergies    Intolerances      REVIEW OF SYSTEMS:  CONSTITUTIONAL: No fever, No chills, No fatigue, No myalgia, No Body ache, No Weakness  EYES: No eye pain,  No visual disturbances, No discharge, NO Redness  ENMT:  No ear pain, No nose bleed, No vertigo; No sinus or throat pain, No Congestion  NECK: No pain, No stiffness  RESPIRATORY: No cough, wheezing, No  hemoptysis, No shortness of breath  CARDIOVASCULAR: No chest pain, palpitations  GASTROINTESTINAL: No abdominal or epigastric pain. No nausea, No vomiting; No diarrhea or constipation. [  ] BM  GENITOURINARY: No dysuria, No frequency, No urgency, No hematuria, or incontinence  NEUROLOGICAL: +decreased sensation in left extremities, No headaches, No dizziness, No tingling, No tremors, No weakness  EXTREMITIES: +swelling, denies Pain  SKIN: + ulcers in both legs, +dry skin, +redness  MUSCULOSKELETAL: No joint pain or swelling; No muscle pain, No back pain, No extremity pain  PSYCHIATRIC: No depression, anxiety, mood swings or difficulty sleeping at night  PAIN SCALE: [ x ] None  [  ] Other-  ROS Unable to obtain due to - [  ] Dementia  [  ] Lethargy  [  ] Sedated [  ] non verbal  REST OF REVIEW Of SYSTEM - [ x ] Normal     Vital Signs Last 24 Hrs  T(C): 36.4 (28 Aug 2020 04:59), Max: 36.7 (27 Aug 2020 14:10)  T(F): 97.6 (28 Aug 2020 04:59), Max: 98.1 (27 Aug 2020 14:10)  HR: 100 (28 Aug 2020 12:51) (57 - 100)  BP: 138/71 (28 Aug 2020 12:51) (89/48 - 154/70)  BP(mean): --  RR: 19 (28 Aug 2020 04:59) (12 - 19)  SpO2: 96% (28 Aug 2020 04:59) (92% - 99%)  Finger Stick        08-27 @ 07:01 - 08-28 @ 07:00  --------------------------------------------------------  IN: 1305 mL / OUT: 1650 mL / NET: -345 mL    08-28 @ 07:01 - 08-28 @ 13:23  --------------------------------------------------------  IN: 0 mL / OUT: 800 mL / NET: -800 mL        PHYSICAL EXAM:  GENERAL:  [x  ] NAD, [ x ] Well appearing, [  ] Agitated, [  ] Drowsy, [  ] Lethargy, [  ] Confused   HEAD:  [x  ] Normal, [  ] Other  EYES:  [x  ] EOMI, [x  ] PERRLA, [x  ] Conjunctiva and sclera clear normal, [  ] Other, [  ] Pallor, [  ] Discharge  ENMT:  [ x ] Normal, [x  ] Moist mucous membranes, [x  ] Good dentition, [ x ] No thrush  NECK:  [ x ] Supple, [x  ] No JVD, [ x ] Normal thyroid, [  ] Lymphadenopathy, [  ] Other  CHEST/LUNG:  [ x ] Clear to auscultation bilaterally, [ x ] Breath Sounds equal B/L / decreased, [  ] Poor effort, [ x ] No rales, [ x ] No rhonchi, [x  ] No wheezing  HEART:  [ x ] Regular rate and rhythm, [  ] Tachycardia, [ x ] Bradycardia, [  ] Irregular, [ x ] No murmurs, No rubs, No gallops, [  ] PPM in place (Mfr:  )  ABDOMEN:  [ x ] Soft, [ x ] Nontender, [x  ] Nondistended, [x  ] No mass, [ + ] Bowel sounds present, [+  ] Obese + Scar   NERVOUS SYSTEM:  [ x ] Alert & Oriented x3, [x  ] Nonfocal, [  ] Confusion, [  ] Encephalopathic, [  ] Sedated, [  ] Unable to assess, [  ] Dementia, [  ] Other-  EXTREMITIES:  [ x ] 2+ Peripheral Pulses, No clubbing, No cyanosis,  [ x ] +2 Edema B/L lower EXT R>L, chronic [ x ] PVD stasis skin changes B/L lower EXT, [ x ] Wound,  dressing around the L 3rd digit and R 2nd digit; partial amputation of L 2nd digit  LYMPH:  No lymphadenopathy noted  SKIN:  Lesion on R lower extremity, [ x ] Pressure ulcers, [ x ] Abrasion rt lower leg Ecchymosis, [  ] Skin tears, [x  ] Other      DIET: Regular diet    LABS:                        13.9   6.76  )-----------( 203      ( 28 Aug 2020 07:50 )             42.3     28 Aug 2020 07:50    141    |  107    |  21     ----------------------------<  80     4.1     |  31     |  1.10     Ca    9.1        28 Aug 2020 07:50    TPro  7.2    /  Alb  3.2    /  TBili  0.5    /  DBili  x      /  AST  13     /  ALT  16     /  AlkPhos  109    28 Aug 2020 07:50    Culture Results:   No growth to date. (08-26 @ 16:46)  Culture Results:   No growth to date. (08-26 @ 16:46)      culture blood  -- .Tissue Other, distal right 2nd toe 08-27 @ 22:01    culture urine  --  08-27 @ 22:01  culture blood  -- .Blood Blood 08-26 @ 16:46    culture urine  --  08-26 @ 16:46    Culture - Tissue with Gram Stain (collected 27 Aug 2020 22:01)  Source: .Tissue Other, distal left 3rd toe  Gram Stain (27 Aug 2020 23:26):    No polymorphonuclear cells seen per low power field    No organisms seen per oil power field    Culture - Tissue with Gram Stain (collected 27 Aug 2020 22:01)  Source: .Tissue Other, distal right 2nd toe  Gram Stain (27 Aug 2020 23:26):    No polymorphonuclear cells seen per low power field    No organisms seen per oil power field    Culture - Blood (collected 26 Aug 2020 16:46)  Source: .Blood Blood  Preliminary Report (27 Aug 2020 17:01):    No growth to date.    Culture - Blood (collected 26 Aug 2020 16:46)  Source: .Blood Blood  Preliminary Report (27 Aug 2020 17:01):    No growth to date.         RADIOLOGY & ADDITIONAL TESTS: NONE      HEALTH ISSUES - PROBLEM Dx:  Type 1 diabetes mellitus with stage 5 chronic kidney disease not on chronic dialysis: Type 1 diabetes mellitus with stage 5 chronic kidney disease not on chronic dialysis  Osteomyelitis: Osteomyelitis  Need for prophylactic measure: Need for prophylactic measure  Hypertension: Hypertension  Hyperlipidemia: Hyperlipidemia  Diabetes mellitus: Diabetes mellitus  Renal transplant recipient: Renal transplant recipient  CKD (chronic kidney disease): CKD (chronic kidney disease)  Diabetic peripheral neuropathy: Diabetic peripheral neuropathy  CVA (cerebral vascular accident): CVA (cerebral vascular accident)  Diabetic ulcer of toe: Diabetic ulcer of toe  Diabetic ulcer of toe of left foot: Diabetic ulcer of toe of left foot      Consultant(s) Notes Reviewed:  [ x ] YES     Care Discussed with [X] Consultants  [ x ] Patient  [  ] Family  [  ]   [  ] Social Service  [ x ] RN, [  ] Physical Therapy  DVT PPX: [  ] Lovenox, [ x ] S C Heparin, [  ] Coumadin, [  ] Xarelto, [  ] Eliquis, [  ] Pradaxa, [  ] IV Heparin drip, [  ] SCD [  ] Contraindication 2 to GI Bleed,[  ] Ambulation  Advanced directive: [ x ] None, [  ] DNR/DNI Patient is a 54y old  Male who presents with a chief complaint of r/o osteomyelitis (28 Aug 2020 12:20)    55 yo M with PMHx of LDRT / right renal transplant for CKD (2013), T1DM on insulin pump, HTN, HLD, [place] CVA (2015) - residual deficits of loss of pain and temperature sensation in left arm and right face, hx of DVT, 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 Noticed the wounds to the distal L 3rd digit about 2 months ago. Patient has been applying collagenase but showed no improvement. Has been seeing a podiatrist outpatient but wounds are not healing so was sent to wound care for additional management. Sent in by Dr. Araiza today to r/o osteomyelitis. & Possible surgery/amputation of left foot 3rd toe distal digit & Rt foot 2nd toe distal digit . Patient also has a burn rash on his chest that he acquired while cooking with oil 3 weeks ago. Scheduled for MRI of left foot but patient states he is unable to have MRI due to having metal coils in his left inner ear. Denies fevers, chills, SOB, chest pain, pain in distal digital wounds.    In the ED, VS T98.8F HR 64 /71 RR 18 SpO2 94% on RA. Labs significant for mildly elevated ESR 25, normal WBC 7.44, BUN/Cr 26/1.40,   Received Vanco and Zosyn x1 in ED.  CXR showing mildly elevated R hemidiaphragm. No focal consolidation.  EKG showing 1st degree AV block  -Pt seen by ID Dr Saucedo, On IV Abx       INTERVAL HPI: 8/27/20: Patient seen and examined at bedside. No acute overnight events. Patient denies any foot pain, extremity tingling, changes in vision, chest pain, SOB, palpitations, nausea, vomiting, hematuria, urinary incontinence, or blood in stools. Patient says he was able to sleep well overnight as well as ambulate to use the bathroom. Pt is currently awaiting surgery for the amputation of L 3rd digit and R 2nd digit. On IV Abx     8/28/20: Patient seen and examined at bedside POD 1 s/p partial amputation of R 2nd toe and L 3rd toe. No acute over night events. Patient admits to resting in bed and "staying off his feet" since the surgery. Patient has been using incentive spirometry at short intervals as well as urination in a urinal. Patient denies any bowel movements since yesterday but admits to passage of gas. Patient  denies any foot pain, extremity tingling, changes in vision, chest pain, SOB, palpitations, nausea, vomiting, abdominal pain, or hematuria. Patient had no difficulty with sleep or pain over night. No acute complaints. PT eval .  OVERNIGHT EVENTS: none    Home Medications:  aspirin 325 mg oral tablet: 1 tab(s) orally once a day (26 Aug 2020 12:06)  Cozaar 25 mg oral tablet: 1 tab(s) orally once a day (26 Aug 2020 12:06)  famotidine 20 mg oral tablet: 1 tab(s) orally once a day (26 Aug 2020 12:06)  gabapentin 300 mg oral capsule: 1 cap(s) orally 2 times a day (26 Aug 2020 12:06)  lovastatin 40 mg oral tablet: 1 tab(s) orally once a day (26 Aug 2020 12:06)  Metoprolol Tartrate 25 mg oral tablet: 3 tab(s) orally 2 times a day    *conf. with patient and pharmacy. 75mg dose, BID* (26 Aug 2020 12:06)  tacrolimus 0.5 mg oral capsule: 3 cap(s) orally 2 times a day (26 Aug 2020 12:06)      MEDICATIONS  (STANDING):  aspirin 325 milliGRAM(s) Oral daily  atorvastatin 10 milliGRAM(s) Oral at bedtime  azaTHIOprine 100 milliGRAM(s) Oral daily  cefTRIAXone   IVPB 2000 milliGRAM(s) IV Intermittent every 24 hours  cloNIDine 0.1 milliGRAM(s) Oral every 12 hours  dextrose 5%. 1000 milliLiter(s) (50 mL/Hr) IV Continuous <Continuous>  dextrose 5%. 1000 milliLiter(s) (50 mL/Hr) IV Continuous <Continuous>  dextrose 50% Injectable 12.5 Gram(s) IV Push once  dextrose 50% Injectable 25 Gram(s) IV Push once  dextrose 50% Injectable 25 Gram(s) IV Push once  dextrose 50% Injectable 12.5 Gram(s) IV Push once  dextrose 50% Injectable 25 Gram(s) IV Push once  dextrose 50% Injectable 25 Gram(s) IV Push once  famotidine    Tablet 20 milliGRAM(s) Oral daily  gabapentin 300 milliGRAM(s) Oral two times a day  heparin   Injectable 5000 Unit(s) SubCutaneous every 8 hours  hydrALAZINE 25 milliGRAM(s) Oral every 8 hours  insulin lispro (HumaLOG) Pump 1 Each SubCutaneous Continuous Pump  losartan 25 milliGRAM(s) Oral daily  metoprolol tartrate 75 milliGRAM(s) Oral two times a day  tacrolimus 1.5 milliGRAM(s) Oral two times a day  trimethoprim   80 mG/sulfamethoxazole 400 mG 1 Tablet(s) Oral daily    MEDICATIONS  (PRN):  dextrose 40% Gel 15 Gram(s) Oral once PRN Blood Glucose LESS THAN 70 milliGRAM(s)/deciliter  dextrose 40% Gel 15 Gram(s) Oral once PRN Blood Glucose LESS THAN 70 milliGRAM(s)/deciliter  glucagon  Injectable 1 milliGRAM(s) IntraMuscular once PRN Glucose LESS THAN 70 milligrams/deciliter  glucagon  Injectable 1 milliGRAM(s) IntraMuscular once PRN Glucose LESS THAN 70 milligrams/deciliter      Allergies    No Known Allergies    Intolerances      REVIEW OF SYSTEMS:  CONSTITUTIONAL: No fever, No chills, No fatigue, No myalgia, No Body ache, No Weakness  EYES: No eye pain,  No visual disturbances, No discharge, NO Redness  ENMT:  No ear pain, No nose bleed, No vertigo; No sinus or throat pain, No Congestion  NECK: No pain, No stiffness  RESPIRATORY: No cough, wheezing, No  hemoptysis, No shortness of breath  CARDIOVASCULAR: No chest pain, palpitations  GASTROINTESTINAL: No abdominal or epigastric pain. No nausea, No vomiting; No diarrhea or constipation. [  ] BM  GENITOURINARY: No dysuria, No frequency, No urgency, No hematuria, or incontinence  NEUROLOGICAL: +decreased sensation in left extremities, No headaches, No dizziness, No tingling, No tremors, No weakness  EXTREMITIES: +swelling, denies Pain  SKIN: + ulcers in both legs, +dry skin, +redness  MUSCULOSKELETAL: No joint pain or swelling; No muscle pain, No back pain, No extremity pain  PSYCHIATRIC: No depression, anxiety, mood swings or difficulty sleeping at night  PAIN SCALE: [ x ] None  [  ] Other-  ROS Unable to obtain due to - [  ] Dementia  [  ] Lethargy  [  ] Sedated [  ] non verbal  REST OF REVIEW Of SYSTEM - [ x ] Normal     Vital Signs Last 24 Hrs  T(C): 36.4 (28 Aug 2020 04:59), Max: 36.7 (27 Aug 2020 14:10)  T(F): 97.6 (28 Aug 2020 04:59), Max: 98.1 (27 Aug 2020 14:10)  HR: 100 (28 Aug 2020 12:51) (57 - 100)  BP: 138/71 (28 Aug 2020 12:51) (89/48 - 154/70)  BP(mean): --  RR: 19 (28 Aug 2020 04:59) (12 - 19)  SpO2: 96% (28 Aug 2020 04:59) (92% - 99%)  Finger Stick        08-27 @ 07:01  -  08-28 @ 07:00  --------------------------------------------------------  IN: 1305 mL / OUT: 1650 mL / NET: -345 mL    08-28 @ 07:01  -  08-28 @ 13:23  --------------------------------------------------------  IN: 0 mL / OUT: 800 mL / NET: -800 mL        PHYSICAL EXAM:  GENERAL:  [x  ] NAD, [ x ] Well appearing, [  ] Agitated, [  ] Drowsy, [  ] Lethargy, [  ] Confused   HEAD:  [x  ] Normal, [  ] Other  EYES:  [x  ] EOMI, [x  ] PERRLA, [x  ] Conjunctiva and sclera clear normal, [  ] Other, [  ] Pallor, [  ] Discharge  ENMT:  [ x ] Normal, [x  ] Moist mucous membranes, [x  ] Good dentition, [ x ] No thrush  NECK:  [ x ] Supple, [x  ] No JVD, [ x ] Normal thyroid, [  ] Lymphadenopathy, [  ] Other  CHEST/LUNG:  [ x ] Clear to auscultation bilaterally, [ x ] Breath Sounds equal B/L / decreased, [  ] Poor effort, [ x ] No rales, [ x ] No rhonchi, [x  ] No wheezing  HEART:  [ x ] Regular rate and rhythm, [  ] Tachycardia, [ x ] Bradycardia, [  ] Irregular, [ x ] No murmurs, No rubs, No gallops, [  ] PPM in place (Mfr:  )  ABDOMEN:  [ x ] Soft, [ x ] Nontender, [x  ] Nondistended, [x  ] No mass, [ + ] Bowel sounds present, [+  ] Obese + Scar   NERVOUS SYSTEM:  [ x ] Alert & Oriented x3, [x  ] Nonfocal, [  ] Confusion, [  ] Encephalopathic, [  ] Sedated, [  ] Unable to assess, [  ] Dementia, [  ] Other-  EXTREMITIES:  [ x ] 2+ Peripheral Pulses, No clubbing, No cyanosis,  [ x ] +2 Edema B/L lower EXT R>L, chronic [ x ] PVD stasis skin changes B/L lower EXT, [ x ] Wound,  dressing around the L 3rd digit and R 2nd digit; partial amputation of L 2nd digit  LYMPH:  No lymphadenopathy noted  SKIN:  Lesion on R lower extremity, [ x ] Pressure ulcers, [ x ] Abrasion rt lower leg Ecchymosis, [  ] Skin tears, [x  ] Other      DIET: Regular diet    LABS:                        13.9   6.76  )-----------( 203      ( 28 Aug 2020 07:50 )             42.3     28 Aug 2020 07:50    141    |  107    |  21     ----------------------------<  80     4.1     |  31     |  1.10     Ca    9.1        28 Aug 2020 07:50    TPro  7.2    /  Alb  3.2    /  TBili  0.5    /  DBili  x      /  AST  13     /  ALT  16     /  AlkPhos  109    28 Aug 2020 07:50    Culture Results:   No growth to date. (08-26 @ 16:46)  Culture Results:   No growth to date. (08-26 @ 16:46)      culture blood  -- .Tissue Other, distal right 2nd toe 08-27 @ 22:01    culture urine  --  08-27 @ 22:01  culture blood  -- .Blood Blood 08-26 @ 16:46    culture urine  --  08-26 @ 16:46    Culture - Tissue with Gram Stain (collected 27 Aug 2020 22:01)  Source: .Tissue Other, distal left 3rd toe  Gram Stain (27 Aug 2020 23:26):    No polymorphonuclear cells seen per low power field    No organisms seen per oil power field    Culture - Tissue with Gram Stain (collected 27 Aug 2020 22:01)  Source: .Tissue Other, distal right 2nd toe  Gram Stain (27 Aug 2020 23:26):    No polymorphonuclear cells seen per low power field    No organisms seen per oil power field    Culture - Blood (collected 26 Aug 2020 16:46)  Source: .Blood Blood  Preliminary Report (27 Aug 2020 17:01):    No growth to date.    Culture - Blood (collected 26 Aug 2020 16:46)  Source: .Blood Blood  Preliminary Report (27 Aug 2020 17:01):    No growth to date.         RADIOLOGY & ADDITIONAL TESTS: NONE      HEALTH ISSUES - PROBLEM Dx:  Type 1 diabetes mellitus with stage 5 chronic kidney disease not on chronic dialysis: Type 1 diabetes mellitus with stage 5 chronic kidney disease not on chronic dialysis  Osteomyelitis: Osteomyelitis  Need for prophylactic measure: Need for prophylactic measure  Hypertension: Hypertension  Hyperlipidemia: Hyperlipidemia  Diabetes mellitus: Diabetes mellitus  Renal transplant recipient: Renal transplant recipient  CKD (chronic kidney disease): CKD (chronic kidney disease)  Diabetic peripheral neuropathy: Diabetic peripheral neuropathy  CVA (cerebral vascular accident): CVA (cerebral vascular accident)  Diabetic ulcer of toe: Diabetic ulcer of toe  Diabetic ulcer of toe of left foot: Diabetic ulcer of toe of left foot      Consultant(s) Notes Reviewed:  [ x ] YES     Care Discussed with [X] Consultants  [ x ] Patient  [  ] Family  [  ]   [  ] Social Service  [ x ] RN, [  ] Physical Therapy  DVT PPX: [  ] Lovenox, [ x ] S C Heparin, [  ] Coumadin, [  ] Xarelto, [  ] Eliquis, [  ] Pradaxa, [  ] IV Heparin drip, [  ] SCD [  ] Contraindication 2 to GI Bleed,[  ] Ambulation  Advanced directive: [ x ] None, [  ] DNR/DNI

## 2020-08-28 NOTE — PROGRESS NOTE ADULT - ASSESSMENT
54y old  Male who presents with a chief complaint of r/o osteomyelitis, schedules for L third digit amputation and partial right 2nd digit amputation.     Non-healing toe ulcers  - With concern for OM.  ID following  - s/p toe amputation left 3rd digit and right partial 2nd digit   - Tolerated procedure well with no obvious cardiac complications  - Pain control  - Follow ID./ortho recs    History of CVA   - Continue ASA and statin    HTN   - BP is controlled and stable.  Some elevations likely reactive to pain  - Continue home clonidine .1 BID, hydralazine 25 q8, losartan 25 daily, lopressor 75 BID    Will continue to follow    Miracle Fowler DNP, NP-C  Cardiology   Spectra #[phone]

## 2020-08-28 NOTE — PROGRESS NOTE ADULT - ASSESSMENT
Patient is s/p left foot partial 3rd digit amputation and right foot partial 2nd digit amputation (DOS: 8/27/2020) secondary to osteomyelitis.     Patient examined and evaluated.  Surgical site dressed with adaptic and dry, sterile dressing.  Patient is to be weight bearing as tolerated to bilateral lower extremity .  Surgical pathology and cultures pending at this time.  Antibiotics as per ID recommendations.

## 2020-08-28 NOTE — PHYSICAL THERAPY INITIAL EVALUATION ADULT - MD ORDER
P.T. evaluate and treat; BLE WBAT due to left foot 3rd digit and right foot 2nd digit amputation for osteomyelitis

## 2020-08-28 NOTE — PROGRESS NOTE ADULT - PROBLEM SELECTOR PLAN 2
[place] CVA (2015) - residual deficits of loss of pain and temperature -Chronic, stable, sensation in left arm and right face  -continue home aspirin 325 mg   -continue home gabapentin for residual neuropathic facial pain

## 2020-08-28 NOTE — DIETITIAN INITIAL EVALUATION ADULT. - PROBLEM SELECTOR PLAN 1
sent in by Dr. Araiza wound care to r/o osteomyelitis, ESR mildly elevated to 25  -s/p vanco and zosyn in the ED,   -pt states he is unable to get MRI due to metals 2/2 inner ear surgery   -f/u blood and urine cx  -Podiatry Dr. Araiza consulted, recs appreciated  -ID Dr. Saucedo consulted, f/u recs- as per ID pt aware that with bone being exposed if there is not already osteo it will develop. Pt understands role of surgery with nonhealing situation and this is reasonable and warranted in this context.  -Rocephin 2 gm IV Daily   -cardio Dr. Jansen's group consulted for cardiac clearance for possible procedure  -scheduled for left 3rd digit amputation and right partial 2nd digit amputation on 8/27/20

## 2020-08-28 NOTE — PROGRESS NOTE ADULT - ASSESSMENT
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    1.	CKD 3, h/o Kidney transplant 2013, Living related donor (Baseline 1.2)  2.	Diabetes  3.	Hypertension  4.	Diabetic foot ulcers, s/p Partial amputation of left third toe & right second toe    Improved renal indices to baseline. Will d/c IVF. Pt on prograf & azathioprine. Monitor blood sugar levels. Insulin coverage as needed.   Dietary restriction. Monitor BP trend. Titrate BP meds as needed. Salt restriction. Avoid nephrotoxic meds as possible.   Podiatry follow up. Will follow electrolytes and renal function trend.

## 2020-08-28 NOTE — PROGRESS NOTE ADULT - ASSESSMENT
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    8/27-Partial amputation of left third toe 27-Aug-2020 14:39:54  Becki Campbell  Partial amputation of right second toe 27-Aug-2020 14:39:37  Becki Campbell.

## 2020-08-28 NOTE — PROGRESS NOTE ADULT - SUBJECTIVE AND OBJECTIVE BOX
CAPILLARY BLOOD GLUCOSE      POCT Blood Glucose.: 131 mg/dL (27 Aug 2020 21:16)  POCT Blood Glucose.: 106 mg/dL (27 Aug 2020 17:09)  POCT Blood Glucose.: 119 mg/dL (27 Aug 2020 15:16)  POCT Blood Glucose.: 160 mg/dL (27 Aug 2020 14:15)  POCT Blood Glucose.: 119 mg/dL (27 Aug 2020 13:34)  POCT Blood Glucose.: 111 mg/dL (27 Aug 2020 12:19)  POCT Blood Glucose.: 84 mg/dL (27 Aug 2020 10:44)  POCT Blood Glucose.: 98 mg/dL (27 Aug 2020 07:55)      Vital Signs Last 24 Hrs  T(C): 36.4 (28 Aug 2020 04:59), Max: 37 (27 Aug 2020 10:15)  T(F): 97.6 (28 Aug 2020 04:59), Max: 98.6 (27 Aug 2020 10:15)  HR: 61 (28 Aug 2020 04:59) (57 - 71)  BP: 127/65 (28 Aug 2020 04:59) (89/48 - 154/77)  BP(mean): --  RR: 19 (28 Aug 2020 04:59) (12 - 19)  SpO2: 96% (28 Aug 2020 04:59) (92% - 99%)    General: WN/WD NAD  Respiratory: CTA B/L  CV: RRR, S1S2, no murmurs, rubs or gallops  Abdominal: Soft, NT, ND +BS, Last BM  Extremities: le foot dsg intact     08-27    141  |  107  |  23  ----------------------------<  95  4.2   |  31  |  1.20    Ca    8.7      27 Aug 2020 08:47    TPro  7.8  /  Alb  3.5  /  TBili  0.6  /  DBili  x   /  AST  16  /  ALT  17  /  AlkPhos  111  08-26      atorvastatin 10 milliGRAM(s) Oral at bedtime  dextrose 40% Gel 15 Gram(s) Oral once PRN  dextrose 40% Gel 15 Gram(s) Oral once PRN  dextrose 50% Injectable 12.5 Gram(s) IV Push once  dextrose 50% Injectable 25 Gram(s) IV Push once  dextrose 50% Injectable 25 Gram(s) IV Push once  dextrose 50% Injectable 12.5 Gram(s) IV Push once  dextrose 50% Injectable 25 Gram(s) IV Push once  dextrose 50% Injectable 25 Gram(s) IV Push once  glucagon  Injectable 1 milliGRAM(s) IntraMuscular once PRN  glucagon  Injectable 1 milliGRAM(s) IntraMuscular once PRN  insulin lispro (HumaLOG) Pump 1 Each SubCutaneous Continuous Pump

## 2020-08-28 NOTE — DIETITIAN INITIAL EVALUATION ADULT. - OTHER INFO
Pt with hx DM2 with insulin pump, CVA, CKD s/p kidney transplant admit for toe amputation. Pt on insulin pump 2 years, but does not know insulin/CHO ration, insulin sensitivity factor. Avoids large CHO portions but does not know CHO counting. Plan on getting continuous glu monitor after d/c, has script. Discussed CHO portions, briefly reviewed CHO counting, portion sizes in anticipation of improving glu control(pt had Hba1c 11 in 2017). BMI 41, wt stable, obese class 3. K+/phos acceptable, recommend d/c restriction at this time.

## 2020-08-28 NOTE — PROGRESS NOTE ADULT - PROBLEM SELECTOR PLAN 1
sent in by Dr. Araiza wound care to r/o osteomyelitis, ESR mildly elevated to 25  -pt states he is unable to get MRI due to metals 2/2 inner ear surgery   -Podiatry Dr. Arazia -OR today   -ID Dr. Saucedo consulted, f/u recs- as per ID pt aware that with bone being exposed if there is not already osteo it will develop. Pt understands role of surgery with nonhealing situation and this is reasonable and warranted in this context.  -Rocephin 2 gm IV Daily   -cardio Dr. Jansen's group consulted for cardiac clearance for procedure  -scheduled for left 3rd digit amputation and right partial 2nd digit amputation today  -patient is medically optimized for scheduled procedure, mod risk patient for mod risk procedure sent in by Dr. Araiza wound care to r/o osteomyelitis, ESR mildly elevated to 25  -pt states he is unable to get MRI due to metals 2/2 inner ear surgery   -day 1 s/p partial amputation of R 2nd toe and L 3rd toe   -pt denies ambulation, admits to incentive spirometry use   -Rocephin 2 gm IV Daily sent in by Dr. Araiza wound care to r/o osteomyelitis, ESR mildly elevated to 25  -pt states he is unable to get MRI due to metals 2/2 inner ear surgery   -day 1 s/p partial amputation of R 2nd toe and L 3rd toe, f/u path results  -admits to incentive spirometry use, encourage ambulation, PT eval  -Rocephin 2 gm IV Daily sent in by Dr. Araiza wound care to r/o osteomyelitis, ESR mildly elevated to 25  -pt states he is unable to get MRI due to metals 2/2 inner ear surgery   -day 1 s/p partial amputation of R 2nd toe and L 3rd toe, f/u path results  -admits to incentive spirometry use, encourage ambulation, PT eval  -Rocephin 2 gm IV Daily continue

## 2020-08-28 NOTE — PROGRESS NOTE ADULT - PROBLEM SELECTOR PLAN 8
hold heparin, pt going to OR today   IMPROVE VTE Individual Risk Assessment    RISK                                                                Points  [  ] Previous VTE                                                  3  [  ] Thrombophilia                                               2  [  ] Lower limb paralysis                                      2        (unable to hold up >15 seconds)    [  ] Current Cancer                                              2         (within 6 months)  [  ] Immobilization > 24 hrs                                1  [  ] ICU/CCU stay > 24 hours                              1  [  ] Age > 60                                                      1  IMPROVE VTE Score: 0    IMPROVE Score 0-1: Low Risk, No VTE prophylaxis required for most patients, encourage ambulation.   IMPROVE Score 2-3: At risk, pharmacologic VTE prophylaxis is indicated for most patients (in the absence of a contraindication)  IMPROVE Score > or = 4: High Risk, pharmacologic VTE prophylaxis is indicated for most patients (in the absence of a contraindication) RISK                                                                Points  [  ] Previous VTE                                                  3  [  ] Thrombophilia                                               2  [  ] Lower limb paralysis                                      2        (unable to hold up >15 seconds)    [  ] Current Cancer                                              2         (within 6 months)  [  ] Immobilization > 24 hrs                                1  [  ] ICU/CCU stay > 24 hours                              1  [  ] Age > 60                                                      1  IMPROVE VTE Score: 0    IMPROVE Score 0-1: Low Risk, No VTE prophylaxis required for most patients, encourage ambulation.   IMPROVE Score 2-3: At risk, pharmacologic VTE prophylaxis is indicated for most patients (in the absence of a contraindication)  IMPROVE Score > or = 4: High Risk, pharmacologic VTE prophylaxis is indicated for most patients (in the absence of a contraindication) Heparin subQ for DVT ppx    RISK                                                                Points  [  ] Previous VTE                                                  3  [  ] Thrombophilia                                               2  [  ] Lower limb paralysis                                      2        (unable to hold up >15 seconds)    [  ] Current Cancer                                              2         (within 6 months)  [  ] Immobilization > 24 hrs                                1  [  ] ICU/CCU stay > 24 hours                              1  [  ] Age > 60                                                      1  IMPROVE VTE Score: 0    IMPROVE Score 0-1: Low Risk, No VTE prophylaxis required for most patients, encourage ambulation.   IMPROVE Score 2-3: At risk, pharmacologic VTE prophylaxis is indicated for most patients (in the absence of a contraindication)  IMPROVE Score > or = 4: High Risk, pharmacologic VTE prophylaxis is indicated for most patients (in the absence of a contraindication)

## 2020-08-28 NOTE — PHYSICAL THERAPY INITIAL EVALUATION ADULT - ADDITIONAL COMMENTS
Pt lives in a house with his wife and kids with 3 steps to enter with a handrail and 6 steps inside with a handrail. Pt was independent with mobility prior to admission. DME includes grab bars in the shower and a RW.

## 2020-08-28 NOTE — DIETITIAN INITIAL EVALUATION ADULT. - PROBLEM SELECTOR PLAN 3
- H/O Renal Transplant at [facility]   -BUN/Cr 26/1.40, mild increase from baseline, baseline appears to be Cr 1.1  -gentle IVF  -BMP in AM , avoid nephrotoxic meds  -if Cr worsens, consider stopping Losartan  -nephro Dr. Bryant consulted, recs appreciated

## 2020-08-28 NOTE — PROGRESS NOTE ADULT - PROBLEM SELECTOR PLAN 1
cont 70% temp basal setting on insulin pump  cont manual bolus setting on insulin pump  cont cons cho diet  goal bg 100-180 in hosp setting

## 2020-08-28 NOTE — PROGRESS NOTE ADULT - ASSESSMENT
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 Admit for r/o osteomyelitis. 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 Admit for r/o osteomyelitis, s/p partial amputation L 3rd toe and R 2nd toe, POD#1.

## 2020-08-29 ENCOUNTER — TRANSCRIPTION ENCOUNTER (OUTPATIENT)
Age: 55
End: 2020-08-29

## 2020-08-29 DIAGNOSIS — E10.22 TYPE 1 DIABETES MELLITUS WITH DIABETIC CHRONIC KIDNEY DISEASE: ICD-10-CM

## 2020-08-29 LAB
ALBUMIN SERPL ELPH-MCNC: 3 G/DL — LOW (ref 3.3–5)
ALP SERPL-CCNC: 106 U/L — SIGNIFICANT CHANGE UP (ref 40–120)
ALT FLD-CCNC: 14 U/L — SIGNIFICANT CHANGE UP (ref 12–78)
ANION GAP SERPL CALC-SCNC: 3 MMOL/L — LOW (ref 5–17)
AST SERPL-CCNC: 15 U/L — SIGNIFICANT CHANGE UP (ref 15–37)
BASOPHILS # BLD AUTO: 0.06 K/UL — SIGNIFICANT CHANGE UP (ref 0–0.2)
BASOPHILS NFR BLD AUTO: 0.8 % — SIGNIFICANT CHANGE UP (ref 0–2)
BILIRUB SERPL-MCNC: 0.5 MG/DL — SIGNIFICANT CHANGE UP (ref 0.2–1.2)
BUN SERPL-MCNC: 19 MG/DL — SIGNIFICANT CHANGE UP (ref 7–23)
CALCIUM SERPL-MCNC: 8.7 MG/DL — SIGNIFICANT CHANGE UP (ref 8.5–10.1)
CHLORIDE SERPL-SCNC: 106 MMOL/L — SIGNIFICANT CHANGE UP (ref 96–108)
CO2 SERPL-SCNC: 32 MMOL/L — HIGH (ref 22–31)
CREAT SERPL-MCNC: 1.3 MG/DL — SIGNIFICANT CHANGE UP (ref 0.5–1.3)
EOSINOPHIL # BLD AUTO: 0.37 K/UL — SIGNIFICANT CHANGE UP (ref 0–0.5)
EOSINOPHIL NFR BLD AUTO: 5.1 % — SIGNIFICANT CHANGE UP (ref 0–6)
GLUCOSE SERPL-MCNC: 93 MG/DL — SIGNIFICANT CHANGE UP (ref 70–99)
HCT VFR BLD CALC: 38.8 % — LOW (ref 39–50)
HGB BLD-MCNC: 12.9 G/DL — LOW (ref 13–17)
IMM GRANULOCYTES NFR BLD AUTO: 0.3 % — SIGNIFICANT CHANGE UP (ref 0–1.5)
LYMPHOCYTES # BLD AUTO: 1.6 K/UL — SIGNIFICANT CHANGE UP (ref 1–3.3)
LYMPHOCYTES # BLD AUTO: 22 % — SIGNIFICANT CHANGE UP (ref 13–44)
MCHC RBC-ENTMCNC: 29.4 PG — SIGNIFICANT CHANGE UP (ref 27–34)
MCHC RBC-ENTMCNC: 33.2 GM/DL — SIGNIFICANT CHANGE UP (ref 32–36)
MCV RBC AUTO: 88.4 FL — SIGNIFICANT CHANGE UP (ref 80–100)
MONOCYTES # BLD AUTO: 0.73 K/UL — SIGNIFICANT CHANGE UP (ref 0–0.9)
MONOCYTES NFR BLD AUTO: 10 % — SIGNIFICANT CHANGE UP (ref 2–14)
NEUTROPHILS # BLD AUTO: 4.5 K/UL — SIGNIFICANT CHANGE UP (ref 1.8–7.4)
NEUTROPHILS NFR BLD AUTO: 61.8 % — SIGNIFICANT CHANGE UP (ref 43–77)
NRBC # BLD: 0 /100 WBCS — SIGNIFICANT CHANGE UP (ref 0–0)
PLATELET # BLD AUTO: 210 K/UL — SIGNIFICANT CHANGE UP (ref 150–400)
POTASSIUM SERPL-MCNC: 3.8 MMOL/L — SIGNIFICANT CHANGE UP (ref 3.5–5.3)
POTASSIUM SERPL-SCNC: 3.8 MMOL/L — SIGNIFICANT CHANGE UP (ref 3.5–5.3)
PROT SERPL-MCNC: 7 G/DL — SIGNIFICANT CHANGE UP (ref 6–8.3)
RBC # BLD: 4.39 M/UL — SIGNIFICANT CHANGE UP (ref 4.2–5.8)
RBC # FLD: 13 % — SIGNIFICANT CHANGE UP (ref 10.3–14.5)
SODIUM SERPL-SCNC: 141 MMOL/L — SIGNIFICANT CHANGE UP (ref 135–145)
WBC # BLD: 7.28 K/UL — SIGNIFICANT CHANGE UP (ref 3.8–10.5)
WBC # FLD AUTO: 7.28 K/UL — SIGNIFICANT CHANGE UP (ref 3.8–10.5)

## 2020-08-29 PROCEDURE — 99232 SBSQ HOSP IP/OBS MODERATE 35: CPT

## 2020-08-29 RX ORDER — LOSARTAN POTASSIUM 100 MG/1
50 TABLET, FILM COATED ORAL DAILY
Refills: 0 | Status: DISCONTINUED | OUTPATIENT
Start: 2020-08-29 | End: 2020-08-31

## 2020-08-29 RX ADMIN — CEFTRIAXONE 100 MILLIGRAM(S): 500 INJECTION, POWDER, FOR SOLUTION INTRAMUSCULAR; INTRAVENOUS at 21:29

## 2020-08-29 RX ADMIN — GABAPENTIN 300 MILLIGRAM(S): 400 CAPSULE ORAL at 06:04

## 2020-08-29 RX ADMIN — Medication 25 MILLIGRAM(S): at 14:46

## 2020-08-29 RX ADMIN — LOSARTAN POTASSIUM 25 MILLIGRAM(S): 100 TABLET, FILM COATED ORAL at 06:06

## 2020-08-29 RX ADMIN — Medication 1 TABLET(S): at 11:02

## 2020-08-29 RX ADMIN — FAMOTIDINE 20 MILLIGRAM(S): 10 INJECTION INTRAVENOUS at 11:02

## 2020-08-29 RX ADMIN — Medication 325 MILLIGRAM(S): at 11:02

## 2020-08-29 RX ADMIN — Medication 0.1 MILLIGRAM(S): at 18:20

## 2020-08-29 RX ADMIN — ATORVASTATIN CALCIUM 10 MILLIGRAM(S): 80 TABLET, FILM COATED ORAL at 21:28

## 2020-08-29 RX ADMIN — Medication 75 MILLIGRAM(S): at 18:20

## 2020-08-29 RX ADMIN — HEPARIN SODIUM 5000 UNIT(S): 5000 INJECTION INTRAVENOUS; SUBCUTANEOUS at 14:46

## 2020-08-29 RX ADMIN — Medication 0.1 MILLIGRAM(S): at 06:04

## 2020-08-29 RX ADMIN — GABAPENTIN 300 MILLIGRAM(S): 400 CAPSULE ORAL at 18:20

## 2020-08-29 RX ADMIN — Medication 25 MILLIGRAM(S): at 06:06

## 2020-08-29 RX ADMIN — TACROLIMUS 1.5 MILLIGRAM(S): 5 CAPSULE ORAL at 21:37

## 2020-08-29 RX ADMIN — HEPARIN SODIUM 5000 UNIT(S): 5000 INJECTION INTRAVENOUS; SUBCUTANEOUS at 06:04

## 2020-08-29 RX ADMIN — TACROLIMUS 1.5 MILLIGRAM(S): 5 CAPSULE ORAL at 09:19

## 2020-08-29 RX ADMIN — AZATHIOPRINE 100 MILLIGRAM(S): 100 TABLET ORAL at 11:02

## 2020-08-29 RX ADMIN — Medication 25 MILLIGRAM(S): at 21:28

## 2020-08-29 RX ADMIN — HEPARIN SODIUM 5000 UNIT(S): 5000 INJECTION INTRAVENOUS; SUBCUTANEOUS at 21:28

## 2020-08-29 NOTE — PROGRESS NOTE ADULT - SUBJECTIVE AND OBJECTIVE BOX
[facility] - Julisa Jansen Grossman, Luly, Karan Kelly Savella  Office Number:  [phone]    Patient resting comfortably in bed in NAD.  Laying flat with no respiratory distress.  No complaints of chest pain, dyspnea, palpitations, PND, or orthopnea.    F/U for:  HTN    MEDICATIONS  (STANDING):  aspirin 325 milliGRAM(s) Oral daily  atorvastatin 10 milliGRAM(s) Oral at bedtime  azaTHIOprine 100 milliGRAM(s) Oral daily  cefTRIAXone   IVPB 2000 milliGRAM(s) IV Intermittent every 24 hours  cloNIDine 0.1 milliGRAM(s) Oral every 12 hours  dextrose 5%. 1000 milliLiter(s) (50 mL/Hr) IV Continuous <Continuous>  dextrose 5%. 1000 milliLiter(s) (50 mL/Hr) IV Continuous <Continuous>  dextrose 50% Injectable 12.5 Gram(s) IV Push once  dextrose 50% Injectable 25 Gram(s) IV Push once  dextrose 50% Injectable 25 Gram(s) IV Push once  dextrose 50% Injectable 12.5 Gram(s) IV Push once  dextrose 50% Injectable 25 Gram(s) IV Push once  dextrose 50% Injectable 25 Gram(s) IV Push once  famotidine    Tablet 20 milliGRAM(s) Oral daily  gabapentin 300 milliGRAM(s) Oral two times a day  heparin   Injectable 5000 Unit(s) SubCutaneous every 8 hours  hydrALAZINE 25 milliGRAM(s) Oral every 8 hours  insulin lispro (HumaLOG) Pump 1 Each SubCutaneous Continuous Pump  losartan 25 milliGRAM(s) Oral daily  metoprolol tartrate 75 milliGRAM(s) Oral two times a day  tacrolimus 1.5 milliGRAM(s) Oral two times a day  trimethoprim   80 mG/sulfamethoxazole 400 mG 1 Tablet(s) Oral daily    MEDICATIONS  (PRN):  dextrose 40% Gel 15 Gram(s) Oral once PRN Blood Glucose LESS THAN 70 milliGRAM(s)/deciliter  dextrose 40% Gel 15 Gram(s) Oral once PRN Blood Glucose LESS THAN 70 milliGRAM(s)/deciliter  glucagon  Injectable 1 milliGRAM(s) IntraMuscular once PRN Glucose LESS THAN 70 milligrams/deciliter  glucagon  Injectable 1 milliGRAM(s) IntraMuscular once PRN Glucose LESS THAN 70 milligrams/deciliter      Allergies    No Known Allergies    Intolerances        Vital Signs Last 24 Hrs  T(C): 36.3 (29 Aug 2020 04:51), Max: 37.2 (28 Aug 2020 20:23)  T(F): 97.4 (29 Aug 2020 04:51), Max: 99 (28 Aug 2020 20:23)  HR: 52 (29 Aug 2020 04:51) (52 - 100)  BP: 115/73 (29 Aug 2020 04:51) (115/73 - 163/69)  BP(mean): --  RR: 18 (29 Aug 2020 04:51) (16 - 18)  SpO2: 92% (29 Aug 2020 04:51) (92% - 93%)    I&O's Summary    28 Aug 2020 07:01  -  29 Aug 2020 07:00  --------------------------------------------------------  IN: 0 mL / OUT: 800 mL / NET: -800 mL        ON EXAM:    General: NAD, awake and alert, oriented x 3  HEENT: Mucous membranes are moist, anicteric  Lungs: Non-labored, breath sounds are clear bilaterally, No wheezing, rales or rhonchi.  Decreased breath sounds b/l  Cardiovascular: Regular, S1 and S2, no murmurs, rubs, or gallops.  Distant  Gastrointestinal: Bowel Sounds present, soft, nontender.   Lymph: Minimal peripheral edema. No lymphadenopathy.  Skin: No rashes or ulcers  Psych:  Mood & affect appropriate    LABS: All Labs Reviewed:                        12.9   7.28  )-----------( 210      ( 29 Aug 2020 06:26 )             38.8                         13.9   6.76  )-----------( 203      ( 28 Aug 2020 07:50 )             42.3                         13.2   7.02  )-----------( 200      ( 27 Aug 2020 08:47 )             41.0     29 Aug 2020 06:26    141    |  106    |  19     ----------------------------<  93     3.8     |  32     |  1.30   28 Aug 2020 07:50    141    |  107    |  21     ----------------------------<  80     4.1     |  31     |  1.10   27 Aug 2020 08:47    141    |  107    |  23     ----------------------------<  95     4.2     |  31     |  1.20     Ca    8.7        29 Aug 2020 06:26  Ca    9.1        28 Aug 2020 07:50  Ca    8.7        27 Aug 2020 08:47    TPro  7.0    /  Alb  3.0    /  TBili  0.5    /  DBili  x      /  AST  15     /  ALT  14     /  AlkPhos  106    29 Aug 2020 06:26  TPro  7.2    /  Alb  3.2    /  TBili  0.5    /  DBili  x      /  AST  13     /  ALT  16     /  AlkPhos  109    28 Aug 2020 07:50  TPro  7.8    /  Alb  3.5    /  TBili  0.6    /  DBili  x      /  AST  16     /  ALT  17     /  AlkPhos  111    26 Aug 2020 10:34          Blood Culture: Organism --  Gram Stain Blood -- Gram Stain   No polymorphonuclear cells seen per low power field  No organisms seen per oil power field  Specimen Source .Tissue Other, distal right 2nd toe  Culture-Blood --    Organism --  Gram Stain Blood -- Gram Stain --  Specimen Source .Blood Blood  Culture-Blood --

## 2020-08-29 NOTE — PROGRESS NOTE ADULT - ASSESSMENT
54y old  Male who presents with a chief complaint of r/o osteomyelitis, schedules for L third digit amputation and partial right 2nd digit amputation.     Non-healing toe ulcers  - With concern for OM.  ID following  - s/p toe amputation left 3rd digit and right partial 2nd digit   - Tolerated procedure well with no obvious cardiac complications  - Pain control  - Follow ID./ortho recs    History of CVA   - Continue ASA and statin    HTN   - BP a bit high.  Will increase losartan to 50 QD.    - Continue home clonidine .1 BID, hydralazine 25 q8, lopressor 75 BID    Will continue to follow

## 2020-08-29 NOTE — DISCHARGE NOTE PROVIDER - NSDCMRMEDTOKEN_GEN_ALL_CORE_FT
aspirin 325 mg oral tablet: 1 tab(s) orally once a day  azaTHIOprine 50 mg oral tablet: 2 tab(s) orally once a day   cloNIDine 0.1 mg oral tablet: 1 tab(s) orally every 12 hours  Cozaar 25 mg oral tablet: 1 tab(s) orally once a day  famotidine 20 mg oral tablet: 1 tab(s) orally once a day  gabapentin 300 mg oral capsule: 1 cap(s) orally 2 times a day  hydrALAZINE 25 mg oral tablet: 1 tab(s) orally every 8 hours  lovastatin 40 mg oral tablet: 1 tab(s) orally once a day  Metoprolol Tartrate 25 mg oral tablet: 3 tab(s) orally 2 times a day    *conf. with patient and pharmacy. 75mg dose, BID*  sulfamethoxazole-trimethoprim 400 mg-80 mg oral tablet: 1 tab(s) orally once a day   tacrolimus 0.5 mg oral capsule: 3 cap(s) orally 2 times a day aspirin 325 mg oral tablet: 1 tab(s) orally once a day  azaTHIOprine 50 mg oral tablet: 2 tab(s) orally once a day   cloNIDine 0.1 mg oral tablet: 1 tab(s) orally every 12 hours  famotidine 20 mg oral tablet: 1 tab(s) orally once a day  gabapentin 300 mg oral capsule: 1 cap(s) orally 2 times a day  hydrALAZINE 25 mg oral tablet: 1 tab(s) orally every 8 hours  losartan 50 mg oral tablet: 1 tab(s) orally once a day  lovastatin 40 mg oral tablet: 1 tab(s) orally once a day  Metoprolol Tartrate 25 mg oral tablet: 3 tab(s) orally 2 times a day    *conf. with patient and pharmacy. 75mg dose, BID*  sulfamethoxazole-trimethoprim 400 mg-80 mg oral tablet: 1 tab(s) orally once a day   tacrolimus 0.5 mg oral capsule: 3 cap(s) orally 2 times a day

## 2020-08-29 NOTE — PROGRESS NOTE ADULT - SUBJECTIVE AND OBJECTIVE BOX
Covering Dr Saucedo and Dr Maurizio NORIEGA LUTHER is a 54yMale , patient examined and chart reviewed.       INTERVAL HPI/ OVERNIGHT EVENTS:   Afebrile No events. In chair.    PAST MEDICAL & SURGICAL HISTORY:  History of DVT (deep vein thrombosis)  Squamous cell carcinoma of skin of left ear  CVA (cerebral vascular accident)  Obesity (BMI 30-39.9)  Diabetic peripheral neuropathy  CKD (chronic kidney disease)  Diabetes mellitus  Hyperlipidemia  Hypertension  S/P kidney transplant  End-stage renal failure with renal transplant: 12/2013  H/O foot surgery: 2005 - right foot - bone fracture - s/p ORIF and subsequent removal of hardware  Vasectomy status: s/p b/l  vasectomy  Ear disease: Left inner ear surgery  Toe infection: 2007 L 4th Toe surgery-amputation secondary to osteomyelitis      For details regarding the patient's social history, family history, and other miscellaneous elements, please refer the initial infectious diseases consultation and/or the admitting history and physical examination for this admission.      ROS:  CONSTITUTIONAL:  Negative fever or chills  EYES:  Negative  blurry vision or double vision  CARDIOVASCULAR:  Negative for chest pain or palpitations  RESPIRATORY:  Negative for cough, wheezing, or SOB   GASTROINTESTINAL:  Negative for nausea, vomiting, diarrhea, constipation, or abdominal pain  GENITOURINARY:  Negative frequency, urgency or dysuria  NEUROLOGIC:  No headache, confusion, dizziness, lightheadedness  All other systems were reviewed and are negative         Current inpatient medications :    ANTIBIOTICS/RELEVANT:  cefTRIAXone   IVPB 2000 milliGRAM(s) IV Intermittent every 24 hours  trimethoprim   80 mG/sulfamethoxazole 400 mG 1 Tablet(s) Oral daily      aspirin 325 milliGRAM(s) Oral daily  atorvastatin 10 milliGRAM(s) Oral at bedtime  cloNIDine 0.1 milliGRAM(s) Oral every 12 hours  dextrose 40% Gel 15 Gram(s) Oral once PRN  dextrose 40% Gel 15 Gram(s) Oral once PRN  dextrose 5%. 1000 milliLiter(s) IV Continuous <Continuous>  dextrose 5%. 1000 milliLiter(s) IV Continuous <Continuous>  dextrose 50% Injectable 12.5 Gram(s) IV Push once  dextrose 50% Injectable 25 Gram(s) IV Push once  dextrose 50% Injectable 25 Gram(s) IV Push once  dextrose 50% Injectable 12.5 Gram(s) IV Push once  dextrose 50% Injectable 25 Gram(s) IV Push once  dextrose 50% Injectable 25 Gram(s) IV Push once  famotidine    Tablet 20 milliGRAM(s) Oral daily  gabapentin 300 milliGRAM(s) Oral two times a day  glucagon  Injectable 1 milliGRAM(s) IntraMuscular once PRN  glucagon  Injectable 1 milliGRAM(s) IntraMuscular once PRN  heparin   Injectable 5000 Unit(s) SubCutaneous every 8 hours  hydrALAZINE 25 milliGRAM(s) Oral every 8 hours  insulin lispro (HumaLOG) Pump 1 Each SubCutaneous Continuous Pump  losartan 50 milliGRAM(s) Oral daily  metoprolol tartrate 75 milliGRAM(s) Oral two times a day      Objective:    08-28 @ 07:01  -  08-29 @ 07:00  --------------------------------------------------------  IN: 0 mL / OUT: 800 mL / NET: -800 mL    08-29 @ 07:01  -  08-29 @ 22:41  --------------------------------------------------------  IN: 600 mL / OUT: 0 mL / NET: 600 mL      T(C): 36.8 (08-29-20 @ 20:24), Max: 36.8 (08-29-20 @ 13:44)  HR: 70 (08-29-20 @ 20:24) (52 - 72)  BP: 151/81 (08-29-20 @ 20:24) (115/73 - 156/81)  RR: 18 (08-29-20 @ 20:24) (18 - 18)  SpO2: 96% (08-29-20 @ 20:24) (92% - 96%)      Physical Exam:  General:  no acute distress  Eyes: sclera anicteric, pupils equal and reactive to light  ENMT: buccal mucosa moist, pharynx not injected  Neck: supple, trachea midline  Lungs: clear, no wheeze/rhonchi  Cardiovascular: regular rate and rhythm, S1 S2  Abdomen: soft, nontender, no organomegaly present, bowel sounds normal  Neurological: alert and oriented x3, Cranial Nerves II-XII grossly intact  Skin: no increased ecchymosis/petechiae/purpura  Lymph Nodes: no palpable cervical/supraclavicular lymph nodes enlargements  Extremities: Karlos foot drsg c/d/i      LABS:                          12.9   7.28  )-----------( 210      ( 29 Aug 2020 06:26 )             38.8       08-29    141  |  106  |  19  ----------------------------<  93  3.8   |  32<H>  |  1.30    Ca    8.7      29 Aug 2020 06:26    TPro  7.0  /  Alb  3.0<L>  /  TBili  0.5  /  DBili  x   /  AST  15  /  ALT  14  /  AlkPhos  106  08-29    MICROBIOLOGY:    Culture - Tissue with Gram Stain (collected 27 Aug 2020 22:01)  Source: .Tissue Other, distal left 3rd toe  Gram Stain (27 Aug 2020 23:26):    No polymorphonuclear cells seen per low power field    No organisms seen per oil power field  Preliminary Report (28 Aug 2020 15:54):    No growth    Culture - Tissue with Gram Stain (collected 27 Aug 2020 22:01)  Source: .Tissue Other, distal right 2nd toe  Gram Stain (27 Aug 2020 23:26):    No polymorphonuclear cells seen per low power field    No organisms seen per oil power field  Preliminary Report (28 Aug 2020 15:56):    No growth    Culture - Blood (collected 26 Aug 2020 16:46)  Source: .Blood Blood  Preliminary Report (27 Aug 2020 17:01):    No growth to date.    RADIOLOGY & ADDITIONAL STUDIES:      Assessment :  S/p left foot partial 3rd digit amputation and right foot partial 2nd digit amputation 8/27/2020 sec osteomyelitis. Tissue cultures ngtd. Stable.        Plan :   Cont Cefftriaxone 2 grams IV Q-day pending micro and path data.   Trend temps and cbc  Wound care per podiatry      Continue with present regiment.  Appropriate use of antibiotics and adverse effects reviewed.      I have discussed the above plan of care with patient in detail. He expressed understanding of the  treatment plan . Risks, benefits and alternatives discussed in detail. I have asked if he has any questions or concerns and appropriately addressed them to the best of my ability .    > 35 minutes were spent in direct patient care reviewing notes, medications ,labs data/ imaging , discussion with multidisciplinary team.    Thank you for allowing me to participate in care of your patient .    Krista Del Valle MD  Infectious Disease  [phone]

## 2020-08-29 NOTE — PROGRESS NOTE ADULT - PROBLEM SELECTOR PLAN 8
Heparin subQ for DVT ppx    RISK                                                                Points  [  ] Previous VTE                                                  3  [  ] Thrombophilia                                               2  [  ] Lower limb paralysis                                      2        (unable to hold up >15 seconds)    [  ] Current Cancer                                              2         (within 6 months)  [  ] Immobilization > 24 hrs                                1  [  ] ICU/CCU stay > 24 hours                              1  [  ] Age > 60                                                      1  IMPROVE VTE Score: 0    IMPROVE Score 0-1: Low Risk, No VTE prophylaxis required for most patients, encourage ambulation.   IMPROVE Score 2-3: At risk, pharmacologic VTE prophylaxis is indicated for most patients (in the absence of a contraindication)  IMPROVE Score > or = 4: High Risk, pharmacologic VTE prophylaxis is indicated for most patients (in the absence of a contraindication)

## 2020-08-29 NOTE — PROGRESS NOTE ADULT - SUBJECTIVE AND OBJECTIVE BOX
Patient is a 54y old  Male who presents with a chief complaint of r/o osteomyelitis      INTERVAL HPI: 8/27/20: Patient seen and examined at bedside. No acute overnight events. Patient denies any foot pain, extremity tingling, changes in vision, chest pain, SOB, palpitations, nausea, vomiting, hematuria, urinary incontinence, or blood in stools. Patient says he was able to sleep well overnight as well as ambulate to use the bathroom. Pt is currently awaiting surgery for the amputation of L 3rd digit and R 2nd digit. On IV Abx     8/28/20: Patient seen and examined at bedside POD 1 s/p partial amputation of R 2nd toe and L 3rd toe. No acute over night events. Patient admits to resting in bed and "staying off his feet" since the surgery. Patient has been using incentive spirometry at short intervals as well as urination in a urinal. Patient denies any bowel movements since yesterday but admits to passage of gas. Patient  denies any foot pain, extremity tingling, changes in vision, chest pain, SOB, palpitations, nausea, vomiting, abdominal pain, or hematuria. Patient had no difficulty with sleep or pain over night. No acute complaints. PT eval   8/29/20: Patient seen and examined at bedside POD 2 s/p partial amputation of R 2nd toe and L 3rd toe. No acute over night events. Patient states he had BM this morning. Eating and drinking well. Patient denies having any lower extremity pain. Offers no acute complaints.   OVERNIGHT EVENTS: none    Home Medications:  aspirin 325 mg oral tablet: 1 tab(s) orally once a day (26 Aug 2020 12:06)  Cozaar 25 mg oral tablet: 1 tab(s) orally once a day (26 Aug 2020 12:06)  famotidine 20 mg oral tablet: 1 tab(s) orally once a day (26 Aug 2020 12:06)  gabapentin 300 mg oral capsule: 1 cap(s) orally 2 times a day (26 Aug 2020 12:06)  lovastatin 40 mg oral tablet: 1 tab(s) orally once a day (26 Aug 2020 12:06)  Metoprolol Tartrate 25 mg oral tablet: 3 tab(s) orally 2 times a day    *conf. with patient and pharmacy. 75mg dose, BID* (26 Aug 2020 12:06)  tacrolimus 0.5 mg oral capsule: 3 cap(s) orally 2 times a day (26 Aug 2020 12:06)      MEDICATIONS  (STANDING):  aspirin 325 milliGRAM(s) Oral daily  atorvastatin 10 milliGRAM(s) Oral at bedtime  azaTHIOprine 100 milliGRAM(s) Oral daily  cefTRIAXone   IVPB 2000 milliGRAM(s) IV Intermittent every 24 hours  cloNIDine 0.1 milliGRAM(s) Oral every 12 hours  dextrose 5%. 1000 milliLiter(s) (50 mL/Hr) IV Continuous <Continuous>  dextrose 5%. 1000 milliLiter(s) (50 mL/Hr) IV Continuous <Continuous>  dextrose 50% Injectable 12.5 Gram(s) IV Push once  dextrose 50% Injectable 25 Gram(s) IV Push once  dextrose 50% Injectable 25 Gram(s) IV Push once  dextrose 50% Injectable 12.5 Gram(s) IV Push once  dextrose 50% Injectable 25 Gram(s) IV Push once  dextrose 50% Injectable 25 Gram(s) IV Push once  famotidine    Tablet 20 milliGRAM(s) Oral daily  gabapentin 300 milliGRAM(s) Oral two times a day  heparin   Injectable 5000 Unit(s) SubCutaneous every 8 hours  hydrALAZINE 25 milliGRAM(s) Oral every 8 hours  insulin lispro (HumaLOG) Pump 1 Each SubCutaneous Continuous Pump  losartan 25 milliGRAM(s) Oral daily  metoprolol tartrate 75 milliGRAM(s) Oral two times a day  tacrolimus 1.5 milliGRAM(s) Oral two times a day  trimethoprim   80 mG/sulfamethoxazole 400 mG 1 Tablet(s) Oral daily    MEDICATIONS  (PRN):  dextrose 40% Gel 15 Gram(s) Oral once PRN Blood Glucose LESS THAN 70 milliGRAM(s)/deciliter  dextrose 40% Gel 15 Gram(s) Oral once PRN Blood Glucose LESS THAN 70 milliGRAM(s)/deciliter  glucagon  Injectable 1 milliGRAM(s) IntraMuscular once PRN Glucose LESS THAN 70 milligrams/deciliter  glucagon  Injectable 1 milliGRAM(s) IntraMuscular once PRN Glucose LESS THAN 70 milligrams/deciliter      Allergies    No Known Allergies    Intolerances      REVIEW OF SYSTEMS:  CONSTITUTIONAL: No fever, No chills, No fatigue, No myalgia, No Body ache, No Weakness  EYES: No eye pain,  No visual disturbances, No discharge, NO Redness  ENMT:  No ear pain, No nose bleed, No vertigo; No sinus or throat pain, No Congestion  NECK: No pain, No stiffness  RESPIRATORY: No cough, wheezing, No  hemoptysis, No shortness of breath  CARDIOVASCULAR: No chest pain, palpitations  GASTROINTESTINAL: No abdominal or epigastric pain. No nausea, No vomiting; No diarrhea or constipation. [x  ] BM  GENITOURINARY: No dysuria, No frequency, No urgency, No hematuria, or incontinence  NEUROLOGICAL: +decreased sensation in BLE L>R, No headaches, No dizziness, No tingling, No tremors, No weakness  EXTREMITIES: +swelling, denies Pain  SKIN: + ulcers in both legs, +dry skin, +redness  MUSCULOSKELETAL: No joint pain or swelling; No muscle pain, No back pain, No extremity pain  PSYCHIATRIC: No depression, anxiety, mood swings or difficulty sleeping at night  PAIN SCALE: [ x ] None  [  ] Other-  ROS Unable to obtain due to - [  ] Dementia  [  ] Lethargy  [  ] Sedated [  ] non verbal  REST OF REVIEW Of SYSTEM - [ x ] Normal     Vital Signs Last 24 Hrs  T(C): 36.3 (29 Aug 2020 04:51), Max: 37.2 (28 Aug 2020 20:23)  T(F): 97.4 (29 Aug 2020 04:51), Max: 99 (28 Aug 2020 20:23)  HR: 52 (29 Aug 2020 04:51) (52 - 100)  BP: 115/73 (29 Aug 2020 04:51) (115/73 - 163/69)  BP(mean): --  RR: 18 (29 Aug 2020 04:51) (16 - 18)  SpO2: 92% (29 Aug 2020 04:51) (92% - 93%)      08-27 @ 07:01 - 08-28 @ 07:00  --------------------------------------------------------  IN: 1305 mL / OUT: 1650 mL / NET: -345 mL    08-28 @ 07:01 - 08-28 @ 13:23  --------------------------------------------------------  IN: 0 mL / OUT: 800 mL / NET: -800 mL        PHYSICAL EXAM:  GENERAL:  [x  ] NAD, [ x ] Well appearing, [  ] Agitated, [  ] Drowsy, [  ] Lethargy, [  ] Confused   HEAD:  [x  ] Normal, [  ] Other  EYES:  [x  ] EOMI, [x  ] PERRLA, [x  ] Conjunctiva and sclera clear normal, [  ] Other, [  ] Pallor, [  ] Discharge  ENMT:  [ x ] Normal, [x  ] Moist mucous membranes, [x  ] Good dentition, [ x ] No thrush  NECK:  [ x ] Supple, [x  ] No JVD, [ x ] Normal thyroid, [  ] Lymphadenopathy, [  ] Other  CHEST/LUNG:  [ x ] Clear to auscultation bilaterally, [ x ] Breath Sounds equal B/L / decreased, [  ] Poor effort, [ x ] No rales, [ x ] No rhonchi, [x  ] No wheezing  HEART:  [ x ] Regular rate and rhythm, [  ] Tachycardia, [ x ] Bradycardia, [  ] Irregular, [ x ] No murmurs, No rubs, No gallops, [  ] PPM in place (Mfr:  )  ABDOMEN:  [ x ] Soft, [ x ] Nontender, [x  ] Nondistended, [x  ] No mass, [ + ] Bowel sounds present, [+  ] Obese + Scar   NERVOUS SYSTEM:  [ x ] Alert & Oriented x3, [x  ] Nonfocal, [  ] Confusion, [  ] Encephalopathic, [  ] Sedated, [  ] Unable to assess, [  ] Dementia, [  ] Other-  EXTREMITIES:  [ x ] 2+ Peripheral Pulses, No clubbing, No cyanosis,  [ x ] +2 Edema B/L lower EXT R>L, chronic [ x ] PVD stasis skin changes B/L lower EXT, [ x ] Wound,  dressing around the L 3rd digit and R 2nd digit; partial amputation of L 2nd digit  LYMPH:  No lymphadenopathy noted  SKIN:  Lesion on R lower extremity, [ x ] Pressure ulcers, [ x ] Abrasion rt lower leg Ecchymosis, [  ] Skin tears, [x  ] Other      DIET: Regular diet    LABS:  cret                        12.9   7.28  )-----------( 210      ( 29 Aug 2020 06:26 )             38.8     08-29    141  |  106  |  19  ----------------------------<  93  3.8   |  32<H>  |  1.30    Ca    8.7      29 Aug 2020 06:26    TPro  7.0  /  Alb  3.0<L>  /  TBili  0.5  /  DBili  x   /  AST  15  /  ALT  14  /  AlkPhos  106  08-29    Culture Results:   No growth to date. (08-26 @ 16:46)  Culture Results:   No growth to date. (08-26 @ 16:46)      culture blood  -- .Tissue Other, distal right 2nd toe 08-27 @ 22:01    culture urine  --  08-27 @ 22:01  culture blood  -- .Blood Blood 08-26 @ 16:46    culture urine  --  08-26 @ 16:46    Culture - Tissue with Gram Stain (collected 27 Aug 2020 22:01)  Source: .Tissue Other, distal left 3rd toe  Gram Stain (27 Aug 2020 23:26):    No polymorphonuclear cells seen per low power field    No organisms seen per oil power field    Culture - Tissue with Gram Stain (collected 27 Aug 2020 22:01)  Source: .Tissue Other, distal right 2nd toe  Gram Stain (27 Aug 2020 23:26):    No polymorphonuclear cells seen per low power field    No organisms seen per oil power field    Culture - Blood (collected 26 Aug 2020 16:46)  Source: .Blood Blood  Preliminary Report (27 Aug 2020 17:01):    No growth to date.    Culture - Blood (collected 26 Aug 2020 16:46)  Source: .Blood Blood  Preliminary Report (27 Aug 2020 17:01):    No growth to date.         RADIOLOGY & ADDITIONAL TESTS: NONE      HEALTH ISSUES - PROBLEM Dx:  Type 1 diabetes mellitus with stage 5 chronic kidney disease not on chronic dialysis: Type 1 diabetes mellitus with stage 5 chronic kidney disease not on chronic dialysis  Osteomyelitis: Osteomyelitis  Need for prophylactic measure: Need for prophylactic measure  Hypertension: Hypertension  Hyperlipidemia: Hyperlipidemia  Diabetes mellitus: Diabetes mellitus  Renal transplant recipient: Renal transplant recipient  CKD (chronic kidney disease): CKD (chronic kidney disease)  Diabetic peripheral neuropathy: Diabetic peripheral neuropathy  CVA (cerebral vascular accident): CVA (cerebral vascular accident)  Diabetic ulcer of toe: Diabetic ulcer of toe  Diabetic ulcer of toe of left foot: Diabetic ulcer of toe of left foot      Consultant(s) Notes Reviewed:  [ x ] YES     Care Discussed with [X] Consultants  [ x ] Patient  [  ] Family  [  ]   [  ] Social Service  [ x ] RN, [  ] Physical Therapy  DVT PPX: [  ] Lovenox, [ x ] S C Heparin, [  ] Coumadin, [  ] Xarelto, [  ] Eliquis, [  ] Pradaxa, [  ] IV Heparin drip, [  ] SCD [  ] Contraindication 2 to GI Bleed,[  ] Ambulation  Advanced directive: [ x ] None, [  ] DNR/DNI Patient is a 54y old  Male who presents with a chief complaint of diabetic ulcers, admitted to r/o osteomyelitis  53 yo M with PMHx of LDRT / right renal transplant for CKD (2013), T1DM on insulin pump, HTN, HLD, [place] CVA (2015) - residual deficits of loss of pain and temperature sensation in left arm and right face, hx of DVT, 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 Noticed the wounds to the distal L 3rd digit about 2 months ago. Patient has been applying collagenase but showed no improvement. Has been seeing a podiatrist outpatient but wounds are not healing so was sent to wound care for additional management. Sent in by Dr. Araiza today to r/o osteomyelitis. & Possible surgery/amputation of left foot 3rd toe distal digit & Rt foot 2nd toe distal digit . Patient also has a burn rash on his chest that he acquired while cooking with oil 3 weeks ago. Scheduled for MRI of left foot but patient states he is unable to have MRI due to having metal coils in his left inner ear. Denies fevers, chills, SOB, chest pain, pain in distal digital wounds.    In the ED, VS T98.8F HR 64 /71 RR 18 SpO2 94% on RA. Labs significant for mildly elevated ESR 25, normal WBC 7.44, BUN/Cr 26/1.40,   Received Vanco and Zosyn x1 in ED.  CXR showing mildly elevated R hemidiaphragm. No focal consolidation.  EKG showing 1st degree AV block  -Pt seen by ID Dr Saucedo, On IV Abx     INTERVAL HPI: 8/27/20: Patient seen and examined at bedside. No acute overnight events. Patient denies any foot pain, extremity tingling, changes in vision, chest pain, SOB, palpitations, nausea, vomiting, hematuria, urinary incontinence, or blood in stools. Patient says he was able to sleep well overnight as well as ambulate to use the bathroom. Pt is currently awaiting surgery for the amputation of L 3rd digit and R 2nd digit. On IV Abx     8/28/20: Patient seen and examined at bedside POD 1 s/p partial amputation of R 2nd toe and L 3rd toe. No acute over night events. Patient admits to resting in bed and "staying off his feet" since the surgery. Patient has been using incentive spirometry at short intervals as well as urination in a urinal. Patient denies any bowel movements since yesterday but admits to passage of gas. Patient  denies any foot pain, extremity tingling, changes in vision, chest pain, SOB, palpitations, nausea, vomiting, abdominal pain, or hematuria. Patient had no difficulty with sleep or pain over night. No acute complaints. PT eval   8/29/20: Patient seen and examined at bedside POD 2 s/p partial amputation of R 2nd toe and L 3rd toe. No acute over night events. Patient states he had BM this morning. Eating and drinking well. Patient denies having any lower extremity pain. Offers no acute complaints.   OVERNIGHT EVENTS: none    Home Medications:  aspirin 325 mg oral tablet: 1 tab(s) orally once a day (26 Aug 2020 12:06)  Cozaar 25 mg oral tablet: 1 tab(s) orally once a day (26 Aug 2020 12:06)  famotidine 20 mg oral tablet: 1 tab(s) orally once a day (26 Aug 2020 12:06)  gabapentin 300 mg oral capsule: 1 cap(s) orally 2 times a day (26 Aug 2020 12:06)  lovastatin 40 mg oral tablet: 1 tab(s) orally once a day (26 Aug 2020 12:06)  Metoprolol Tartrate 25 mg oral tablet: 3 tab(s) orally 2 times a day    *conf. with patient and pharmacy. 75mg dose, BID* (26 Aug 2020 12:06)  tacrolimus 0.5 mg oral capsule: 3 cap(s) orally 2 times a day (26 Aug 2020 12:06)      MEDICATIONS  (STANDING):  aspirin 325 milliGRAM(s) Oral daily  atorvastatin 10 milliGRAM(s) Oral at bedtime  azaTHIOprine 100 milliGRAM(s) Oral daily  cefTRIAXone   IVPB 2000 milliGRAM(s) IV Intermittent every 24 hours  cloNIDine 0.1 milliGRAM(s) Oral every 12 hours  dextrose 5%. 1000 milliLiter(s) (50 mL/Hr) IV Continuous <Continuous>  dextrose 5%. 1000 milliLiter(s) (50 mL/Hr) IV Continuous <Continuous>  dextrose 50% Injectable 12.5 Gram(s) IV Push once  dextrose 50% Injectable 25 Gram(s) IV Push once  dextrose 50% Injectable 25 Gram(s) IV Push once  dextrose 50% Injectable 12.5 Gram(s) IV Push once  dextrose 50% Injectable 25 Gram(s) IV Push once  dextrose 50% Injectable 25 Gram(s) IV Push once  famotidine    Tablet 20 milliGRAM(s) Oral daily  gabapentin 300 milliGRAM(s) Oral two times a day  heparin   Injectable 5000 Unit(s) SubCutaneous every 8 hours  hydrALAZINE 25 milliGRAM(s) Oral every 8 hours  insulin lispro (HumaLOG) Pump 1 Each SubCutaneous Continuous Pump  losartan 25 milliGRAM(s) Oral daily  metoprolol tartrate 75 milliGRAM(s) Oral two times a day  tacrolimus 1.5 milliGRAM(s) Oral two times a day  trimethoprim   80 mG/sulfamethoxazole 400 mG 1 Tablet(s) Oral daily    MEDICATIONS  (PRN):  dextrose 40% Gel 15 Gram(s) Oral once PRN Blood Glucose LESS THAN 70 milliGRAM(s)/deciliter  dextrose 40% Gel 15 Gram(s) Oral once PRN Blood Glucose LESS THAN 70 milliGRAM(s)/deciliter  glucagon  Injectable 1 milliGRAM(s) IntraMuscular once PRN Glucose LESS THAN 70 milligrams/deciliter  glucagon  Injectable 1 milliGRAM(s) IntraMuscular once PRN Glucose LESS THAN 70 milligrams/deciliter      Allergies    No Known Allergies    Intolerances      REVIEW OF SYSTEMS:i feel fine.  CONSTITUTIONAL: No fever, No chills, No fatigue, No myalgia, No Body ache, No Weakness  EYES: No eye pain,  No visual disturbances, No discharge, NO Redness  ENMT:  No ear pain, No nose bleed, No vertigo; No sinus or throat pain, No Congestion  NECK: No pain, No stiffness  RESPIRATORY: No cough, wheezing, No  hemoptysis, No shortness of breath  CARDIOVASCULAR: No chest pain, palpitations  GASTROINTESTINAL: No abdominal or epigastric pain. No nausea, No vomiting; No diarrhea or constipation. [x  ] BM  GENITOURINARY: No dysuria, No frequency, No urgency, No hematuria, or incontinence  NEUROLOGICAL: +decreased sensation in BLE L>R, No headaches, No dizziness, No tingling, No tremors, No weakness  EXTREMITIES: +swelling, denies Pain  SKIN: + ulcers in both legs, +dry skin, +redness  MUSCULOSKELETAL: No joint pain or swelling; No muscle pain, No back pain, No extremity pain  PSYCHIATRIC: No depression, anxiety, mood swings or difficulty sleeping at night  PAIN SCALE: [ x ] None  [  ] Other-  ROS Unable to obtain due to - [  ] Dementia  [  ] Lethargy  [  ] Sedated [  ] non verbal  REST OF REVIEW Of SYSTEM - [ x ] Normal     Vital Signs Last 24 Hrs  T(C): 36.3 (29 Aug 2020 04:51), Max: 37.2 (28 Aug 2020 20:23)  T(F): 97.4 (29 Aug 2020 04:51), Max: 99 (28 Aug 2020 20:23)  HR: 52 (29 Aug 2020 04:51) (52 - 100)  BP: 115/73 (29 Aug 2020 04:51) (115/73 - 163/69)  BP(mean): --  RR: 18 (29 Aug 2020 04:51) (16 - 18)  SpO2: 92% (29 Aug 2020 04:51) (92% - 93%)      08-27 @ 07:01  -  08-28 @ 07:00  --------------------------------------------------------  IN: 1305 mL / OUT: 1650 mL / NET: -345 mL    08-28 @ 07:01 - 08-28 @ 13:23  --------------------------------------------------------  IN: 0 mL / OUT: 800 mL / NET: -800 mL        PHYSICAL EXAM:  GENERAL:  [x  ] NAD, [ x ] Well appearing, [  ] Agitated, [  ] Drowsy, [  ] Lethargy, [  ] Confused   HEAD:  [x  ] Normal, [  ] Other  EYES:  [x  ] EOMI, [x  ] PERRLA, [x  ] Conjunctiva and sclera clear normal, [  ] Other, [  ] Pallor, [  ] Discharge  ENMT:  [ x ] Normal, [x  ] Moist mucous membranes, [x  ] Good dentition, [ x ] No thrush  NECK:  [ x ] Supple, [x  ] No JVD, [ x ] Normal thyroid, [  ] Lymphadenopathy, [  ] Other  CHEST/LUNG:  [ x ] Clear to auscultation bilaterally, [ x ] Breath Sounds equal B/L / decreased, [  ] Poor effort, [ x ] No rales, [ x ] No rhonchi, [x  ] No wheezing  HEART:  [ x ] Regular rate and rhythm, [  ] Tachycardia, [ x ] Bradycardia, [  ] Irregular, [ x ] No murmurs, No rubs, No gallops, [  ] PPM in place (Mfr:  )  ABDOMEN:  [ x ] Soft, [ x ] Nontender, [x  ] Nondistended, [x  ] No mass, [ + ] Bowel sounds present, [+  ] Obese + Scar   NERVOUS SYSTEM:  [ x ] Alert & Oriented x3, [x  ] Nonfocal, [  ] Confusion, [  ] Encephalopathic, [  ] Sedated, [  ] Unable to assess, [  ] Dementia, [  ] Other-  EXTREMITIES:  [ x ] 2+ Peripheral Pulses, No clubbing, No cyanosis,  [ x ] +2 Edema B/L lower EXT R>L, chronic [ x ] PVD stasis skin changes B/L lower EXT, [ x ] Wound,  dressing around the L 3rd digit and R 2nd digit; partial amputation of L 2nd digit  LYMPH:  No lymphadenopathy noted  SKIN:  Lesion on R lower extremity, [ x ] Pressure ulcers, [ x ] Abrasion rt lower leg Ecchymosis, [  ] Skin tears, [x  ] Other      DIET: Regular diet    LABS:  cret                        12.9   7.28  )-----------( 210      ( 29 Aug 2020 06:26 )             38.8     08-29    141  |  106  |  19  ----------------------------<  93  3.8   |  32<H>  |  1.30    Ca    8.7      29 Aug 2020 06:26    TPro  7.0  /  Alb  3.0<L>  /  TBili  0.5  /  DBili  x   /  AST  15  /  ALT  14  /  AlkPhos  106  08-29    Culture Results:   No growth to date. (08-26 @ 16:46)  Culture Results:   No growth to date. (08-26 @ 16:46)      culture blood  -- .Tissue Other, distal right 2nd toe 08-27 @ 22:01    culture urine  --  08-27 @ 22:01  culture blood  -- .Blood Blood 08-26 @ 16:46    culture urine  --  08-26 @ 16:46    Culture - Tissue with Gram Stain (collected 27 Aug 2020 22:01)  Source: .Tissue Other, distal left 3rd toe  Gram Stain (27 Aug 2020 23:26):    No polymorphonuclear cells seen per low power field    No organisms seen per oil power field    Culture - Tissue with Gram Stain (collected 27 Aug 2020 22:01)  Source: .Tissue Other, distal right 2nd toe  Gram Stain (27 Aug 2020 23:26):    No polymorphonuclear cells seen per low power field    No organisms seen per oil power field    Culture - Blood (collected 26 Aug 2020 16:46)  Source: .Blood Blood  Preliminary Report (27 Aug 2020 17:01):    No growth to date.    Culture - Blood (collected 26 Aug 2020 16:46)  Source: .Blood Blood  Preliminary Report (27 Aug 2020 17:01):    No growth to date.         RADIOLOGY & ADDITIONAL TESTS: NONE      HEALTH ISSUES - PROBLEM Dx:  Type 1 diabetes mellitus with stage 5 chronic kidney disease not on chronic dialysis: Type 1 diabetes mellitus with stage 5 chronic kidney disease not on chronic dialysis  Osteomyelitis: Osteomyelitis  Need for prophylactic measure: Need for prophylactic measure  Hypertension: Hypertension  Hyperlipidemia: Hyperlipidemia  Diabetes mellitus: Diabetes mellitus  Renal transplant recipient: Renal transplant recipient  CKD (chronic kidney disease): CKD (chronic kidney disease)  Diabetic peripheral neuropathy: Diabetic peripheral neuropathy  CVA (cerebral vascular accident): CVA (cerebral vascular accident)  Diabetic ulcer of toe: Diabetic ulcer of toe  Diabetic ulcer of toe of left foot: Diabetic ulcer of toe of left foot      Consultant(s) Notes Reviewed:  [ x ] YES     Care Discussed with [X] Consultants  [ x ] Patient  [  ] Family  [  ]   [  ] Social Service  [ x ] RN, [  ] Physical Therapy  DVT PPX: [  ] Lovenox, [ x ] S C Heparin, [  ] Coumadin, [  ] Xarelto, [  ] Eliquis, [  ] Pradaxa, [  ] IV Heparin drip, [  ] SCD [  ] Contraindication 2 to GI Bleed,[  ] Ambulation  Advanced directive: [ x ] None, [  ] DNR/DNI

## 2020-08-29 NOTE — PROGRESS NOTE ADULT - PROBLEM SELECTOR PLAN 1
sent in by Dr. Araiza wound care to r/o osteomyelitis, ESR mildly elevated to 25  -pt states he is unable to get MRI due to metals 2/2 inner ear surgery   -partial amputation of R 2nd toe and L 3rd toe POD#2, f/u path results  -admits to incentive spirometry use, encourage ambulation  -PT consulted; recs appreciated - no skilled PT needs at this time  -Rocephin 2 gm IV Daily continue

## 2020-08-29 NOTE — DISCHARGE NOTE PROVIDER - PROVIDER TOKENS
PROVIDER:[TOKEN:[[id number]]] PROVIDER:[TOKEN:[[id number]]],PROVIDER:[TOKEN:[[id number]]],PROVIDER:[TOKEN:[[id number]],FOLLOWUP:[1 week]]

## 2020-08-29 NOTE — PROGRESS NOTE ADULT - PROBLEM SELECTOR PLAN 3
- H/O Renal Transplant at [facility]- LDRT  --Cr stable   -if Cr worsens, consider stopping Losartan  -nephro Dr. Bryant following the case - H/O Renal Transplant at [facility]- LDRT  --Cr stable   -nephro Dr. Bryant following the case

## 2020-08-29 NOTE — DISCHARGE NOTE PROVIDER - HOSPITAL COURSE
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 Noticed the wounds to the distal L 3rd digit about 2 months ago. Patient has been applying collagenase but showed no improvement. Has been seeing a podiatrist outpatient but wounds are not healing so was sent to wound care for additional management. Sent in by Dr. Araiza today to r/o osteomyelitis. & Possible surgery/amputation of left foot 3rd toe distal digit & Rt foot 2nd toe distal digit . Patient also has a burn rash on his chest that he acquired while cooking with oil 3 weeks ago. Scheduled for MRI of left foot but patient states he is unable to have MRI due to having metal coils in his left inner ear. Denies fevers, chills, SOB, chest pain, pain in distal digital wounds.        In the ED, VS T98.8F HR 64 /71 RR 18 SpO2 94% on RA. Labs significant for mildly elevated ESR 25, normal WBC 7.44, BUN/Cr 26/1.40,     Received Vanco and Zosyn x1 in ED.    CXR showing mildly elevated R hemidiaphragm. No focal consolidation.    EKG showing 1st degree AV block (26 Aug 2020 12:52)            ---    HOSPITAL COURSE: Patient was admitted for osteomyelitis of toe. Cardiology was consulted for clearance prior to surgery. Patient had partial amputation of left 3rd digit and right 2nd digit on 8/27/20 with no complications. Tissue culture grew ________. Patient was given rocephin IV for a total of _____ days. Patient was continued on home blood pressure medications Cozaar, clonidine, metoprolol, and hydralazine with hold parameters. Patient was also continued on azathioprine, tacrolimus, and prophylactic bactrim.         ---    CONSULTANTS:     Podiatry:     Cardiology:    Endocrinology:     Infectious Disease:     Nephrology:         ---    TIME SPENT:    I, the attending physician, was physically present for the key portions of the evaluation and management (E/M) service provided. The total amount of time spent reviewing the hospital notes, laboratory values, imaging findings, assessing/counseling the patient, discussing with consultant physicians, social work, nursing staff was -- minutes        ---    Primary care provider was made aware of plan for discharge:      [  ] NO     [  ] YES HPI:    53 yo M with PMHx of LDRT / right renal transplant for CKD (2013), T1DM on insulin pump, HTN, HLD, [place] CVA (2015) - residual deficits of loss of pain and temperature sensation in left arm and right face, hx of DVT, 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 Noticed the wounds to the distal L 3rd digit about 2 months ago. Patient has been applying collagenase but showed no improvement. Has been seeing a podiatrist outpatient but wounds are not healing so was sent to wound care for additional management. Sent in by Dr. Araiza today to r/o osteomyelitis. & Possible surgery/amputation of left foot 3rd toe distal digit & Rt foot 2nd toe distal digit . Patient also has a burn rash on his chest that he acquired while cooking with oil 3 weeks ago. Scheduled for MRI of left foot but patient states he is unable to have MRI due to having metal coils in his left inner ear. Denies fevers, chills, SOB, chest pain, pain in distal digital wounds.        In the ED, VS T98.8F HR 64 /71 RR 18 SpO2 94% on RA. Labs significant for mildly elevated ESR 25, normal WBC 7.44, BUN/Cr 26/1.40,     Received Vanco and Zosyn x1 in ED.    CXR showing mildly elevated R hemidiaphragm. No focal consolidation.    EKG showing 1st degree AV block (26 Aug 2020 12:52)            ---    HOSPITAL COURSE: Patient was admitted for osteomyelitis of toe. Cardiology was consulted for clearance prior to surgery. Patient had partial amputation of left 3rd digit and right 2nd digit on 8/27/20 with no complications. Tissue culture grew ________. Patient was given rocephin IV for a total of _____ days. Patient was continued on home blood pressure medications Cozaar, clonidine, metoprolol, and hydralazine with hold parameters. Patient was also continued on azathioprine, tacrolimus, and prophylactic bactrim. Patient was given heparin for DVT prophylaxis.         ---    CONSULTANTS:     Podiatry:     Cardiology:    Endocrinology:     Infectious Disease:     Nephrology:         ---    TIME SPENT:    I, the attending physician, was physically present for the key portions of the evaluation and management (E/M) service provided. The total amount of time spent reviewing the hospital notes, laboratory values, imaging findings, assessing/counseling the patient, discussing with consultant physicians, social work, nursing staff was -- minutes        ---    Primary care provider was made aware of plan for discharge:      [  ] NO     [  ] YES HPI:    53 yo M with PMHx of LDRT / right renal transplant for CKD (2013), T1DM on insulin pump, HTN, HLD, Wallengberg CVA (2015) - residual deficits of loss of pain and temperature sensation in left arm and right face, hx of DVT, 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 Noticed the wounds to the distal L 3rd digit about 2 months ago. Patient has been applying collagenase but showed no improvement. Has been seeing a podiatrist outpatient but wounds are not healing so was sent to wound care for additional management. Sent in by Dr. Araiza today to r/o osteomyelitis. & Possible surgery/amputation of left foot 3rd toe distal digit & Rt foot 2nd toe distal digit . Patient also has a burn rash on his chest that he acquired while cooking with oil 3 weeks ago. Scheduled for MRI of left foot but patient states he is unable to have MRI due to having metal coils in his left inner ear. Denies fevers, chills, SOB, chest pain, pain in distal digital wounds.        In the ED, VS T98.8F HR 64 /71 RR 18 SpO2 94% on RA. Labs significant for mildly elevated ESR 25, normal WBC 7.44, BUN/Cr 26/1.40,     Received Vanco and Zosyn x1 in ED.    CXR showing mildly elevated R hemidiaphragm. No focal consolidation.    EKG showing 1st degree AV block (26 Aug 2020 12:52)            ---    HOSPITAL COURSE: Patient was admitted for osteomyelitis of toe. Cardiology was consulted for clearance prior to surgery. Patient had partial amputation of left 3rd digit and right 2nd digit on 8/27/20 with no complications. Tissue culture grew ________. Patient was given rocephin IV for a total of _____ days. Patient was continued on home blood pressure medications Cozaar, clonidine, metoprolol, and hydralazine with hold parameters. Patient was also continued on azathioprine, tacrolimus, and prophylactic bactrim. Patient was given heparin for DVT prophylaxis.         ---    CONSULTANTS:     Podiatry: Dr. Araiza    Cardiology: Dr. Larson     Endocrinology: Dr. Perlman     Infectious Disease: Dr. Saucedo	    Nephrology: Dr. Hancock        ---    TIME SPENT:    I, the attending physician, was physically present for the key portions of the evaluation and management (E/M) service provided. The total amount of time spent reviewing the hospital notes, laboratory values, imaging findings, assessing/counseling the patient, discussing with consultant physicians, social work, nursing staff was -- minutes        ---    Primary care provider was made aware of plan for discharge:      [  ] NO     [  ] YES HPI:    53 yo M with PMHx of LDRT / right renal transplant for CKD (2013), T1DM on insulin pump, HTN, HLD, [place]engberg CVA (2015) - residual deficits of loss of pain and temperature sensation in left arm and right face, hx of DVT, 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 Noticed the wounds to the distal L 3rd digit about 2 months ago. Patient has been applying collagenase but showed no improvement. Has been seeing a podiatrist outpatient but wounds are not healing so was sent to wound care for additional management. Sent in by Dr. Araiza today to r/o osteomyelitis. & Possible surgery/amputation of left foot 3rd toe distal digit & Rt foot 2nd toe distal digit . Patient also has a burn rash on his chest that he acquired while cooking with oil 3 weeks ago. Scheduled for MRI of left foot but patient states he is unable to have MRI due to having metal coils in his left inner ear. Denies fevers, chills, SOB, chest pain, pain in distal digital wounds.        In the ED, VS T98.8F HR 64 /71 RR 18 SpO2 94% on RA. Labs significant for mildly elevated ESR 25, normal WBC 7.44, BUN/Cr 26/1.40,     Received Vanco and Zosyn x1 in ED.    CXR showing mildly elevated R hemidiaphragm. No focal consolidation.    EKG showing 1st degree AV block (26 Aug 2020 12:52)            ---    HOSPITAL COURSE: Patient was admitted for osteomyelitis of toe. Cardiology was consulted for clearance prior to surgery. Patient had partial amputation of left 3rd digit and right 2nd digit on 8/27/20 with no complications. Tissue culture grew ________. Patient was given rocephin IV for a total of _____ days. Patient was continued on home blood pressure medications Cozaar, clonidine, metoprolol, and hydralazine with hold parameters. Patient was also continued on azathioprine, tacrolimus, and prophylactic bactrim. Patient was given heparin for DVT prophylaxis. PT evaluated patient and recommended discharge to _________.         ---    CONSULTANTS:     Podiatry: Dr. Araiza    Cardiology: Dr. Larson     Endocrinology: Dr. Perlman     Infectious Disease: Dr. Saucedo	    Nephrology: Dr. Hancock        ---    TIME SPENT:    I, the attending physician, was physically present for the key portions of the evaluation and management (E/M) service provided. The total amount of time spent reviewing the hospital notes, laboratory values, imaging findings, assessing/counseling the patient, discussing with consultant physicians, social work, nursing staff was -- minutes        ---    Primary care provider was made aware of plan for discharge:      [  ] NO     [  ] YES HPI:    53 yo M with PMHx of LDRT / right renal transplant for CKD (2013), T1DM on insulin pump, HTN, HLD, Wallengberg CVA (2015) - residual deficits of loss of pain and temperature sensation in left arm and right face, hx of DVT, 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 Noticed the wounds to the distal L 3rd digit about 2 months ago. Patient has been applying collagenase but showed no improvement. Has been seeing a podiatrist outpatient but wounds are not healing so was sent to wound care for additional management. Sent in by Dr. Araiza today to r/o osteomyelitis. & Possible surgery/amputation of left foot 3rd toe distal digit & Rt foot 2nd toe distal digit . Patient also has a burn rash on his chest that he acquired while cooking with oil 3 weeks ago. Scheduled for MRI of left foot but patient states he is unable to have MRI due to having metal coils in his left inner ear. Denies fevers, chills, SOB, chest pain, pain in distal digital wounds.        In the ED, VS T98.8F HR 64 /71 RR 18 SpO2 94% on RA. Labs significant for mildly elevated ESR 25, normal WBC 7.44, BUN/Cr 26/1.40,     Received Vanco and Zosyn x1 in ED.    CXR showing mildly elevated R hemidiaphragm. No focal consolidation.    EKG showing 1st degree AV block (26 Aug 2020 12:52)            ---    HOSPITAL COURSE: Patient was admitted for osteomyelitis of toe. Cardiology was consulted for clearance prior to surgery. Patient had partial amputation of left 3rd digit and right 2nd digit on 8/27/20 with no complications. Tissue culture with no growth to date. Patient was given rocephin IV for a total of 6 days. Patient was continued on home blood pressure medications Cozaar, clonidine, metoprolol, and hydralazine with hold parameters. Patient was also continued on azathioprine, tacrolimus, and prophylactic bactrim. Patient was given heparin for DVT prophylaxis. PT evaluated patient and recommended discharge to home with PT with no skilled PT.  Surgical bone pathology returned with clean margins, with no osteomyelitis.  Antibiotics were discontinued. Patient was seen and medically optimized at day of discharge.         ---    CONSULTANTS:     Podiatry: Dr. Araiza    Cardiology: Dr. Larson     Endocrinology: Dr. Perlman     Infectious Disease: Dr. Saucedo	    Nephrology: Dr. Hancock        ---    TIME SPENT:    I, the attending physician, was physically present for the key portions of the evaluation and management (E/M) service provided. The total amount of time spent reviewing the hospital notes, laboratory values, imaging findings, assessing/counseling the patient, discussing with consultant physicians, social work, nursing staff was -- minutes        ---    Primary care provider was made aware of plan for discharge:      [  ] NO     [ x] YES HPI:    53 yo M with PMHx of LDRT / right renal transplant for CKD (2013), T1DM on insulin pump, HTN, HLD, [place] CVA (2015) - residual deficits of loss of pain and temperature sensation in left arm and right face, hx of DVT, 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 Noticed the wounds to the distal L 3rd digit about 2 months ago. Patient has been applying collagenase but showed no improvement. Has been seeing a podiatrist outpatient but wounds are not healing so was sent to wound care for additional management. Sent in by Dr. Araiza today to r/o osteomyelitis. & Possible surgery/amputation of left foot 3rd toe distal digit & Rt foot 2nd toe distal digit . Patient also has a burn rash on his chest that he acquired while cooking with oil 3 weeks ago. Scheduled for MRI of left foot but patient states he is unable to have MRI due to having metal coils in his left inner ear. Denies fevers, chills, SOB, chest pain, pain in distal digital wounds.        In the ED, VS T98.8F HR 64 /71 RR 18 SpO2 94% on RA. Labs significant for mildly elevated ESR 25, normal WBC 7.44, BUN/Cr 26/1.40,     Received Vanco and Zosyn x1 in ED.    CXR showing mildly elevated R hemidiaphragm. No focal consolidation.    EKG showing 1st degree AV block (26 Aug 2020 12:52)            ---    HOSPITAL COURSE: Patient was admitted for osteomyelitis of toe. Cardiology was consulted for clearance prior to surgery. Patient had partial amputation of left 3rd digit and right 2nd digit on 8/27/20 with no complications. Tissue culture with no growth to date. Patient was given rocephin IV for a total of 6 days. Patient was continued on home blood pressure medications Cozaar, clonidine, metoprolol, and hydralazine with hold parameters. Patient was also continued on azathioprine, tacrolimus, and prophylactic bactrim. Patient was given heparin for DVT prophylaxis. PT evaluated patient and recommended discharge to home with PT with no skilled PT.  Surgical bone pathology returned with clean margins, with no osteomyelitis.  Antibiotics were discontinued. Patient was medically optimized and improved clinically throughout hospital course. Patient seen and examined on day of discharge. Patient is medically stable for discharge to home with close outpatient follow up with wound care, Dr. Araiza this Friday.        ---    CONSULTANTS:     Podiatry: Dr. Araiza    Cardiology: Dr. Larson     Endocrinology: Dr. Perlman     Infectious Disease: Dr. Saucedo	    Nephrology: Dr. Hancock        ---    TIME SPENT:    I, the attending physician, was physically present for the key portions of the evaluation and management (E/M) service provided. The total amount of time spent reviewing the hospital notes, laboratory values, imaging findings, assessing/counseling the patient, discussing with consultant physicians, social work, nursing staff was -- minutes        ---    Primary care provider was made aware of plan for discharge:      [  ] NO     [ x] YES HPI:    53 yo M with PMHx of LDRT / right renal transplant for CKD (2013), T1DM on insulin pump, HTN, HLD, [place] CVA (2015) - residual deficits of loss of pain and temperature sensation in left arm and right face, hx of DVT, 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 Noticed the wounds to the distal L 3rd digit about 2 months ago. Patient has been applying collagenase but showed no improvement. Has been seeing a podiatrist outpatient but wounds are not healing so was sent to wound care for additional management. Sent in by Dr. Araiza today to r/o osteomyelitis. & Possible surgery/amputation of left foot 3rd toe distal digit & Rt foot 2nd toe distal digit . Patient also has a burn rash on his chest that he acquired while cooking with oil 3 weeks ago. Scheduled for MRI of left foot but patient states he is unable to have MRI due to having metal coils in his left inner ear. Denies fevers, chills, SOB, chest pain, pain in distal digital wounds.        In the ED, VS T98.8F HR 64 /71 RR 18 SpO2 94% on RA. Labs significant for mildly elevated ESR 25, normal WBC 7.44, BUN/Cr 26/1.40,     Received Vanco and Zosyn x1 in ED.    CXR showing mildly elevated R hemidiaphragm. No focal consolidation.    EKG showing 1st degree AV block (26 Aug 2020 12:52)            ---    HOSPITAL COURSE: Patient was admitted for osteomyelitis of toe. Cardiology was consulted for clearance prior to surgery. Patient had partial amputation of left 3rd digit and right 2nd digit on 8/27/20 with no complications. Tissue culture with no growth to date. Patient was given rocephin IV for a total of 6 days. Patient was continued on home blood pressure medications Cozaar, clonidine, metoprolol, and hydralazine with hold parameters. Patient was also continued on azathioprine, tacrolimus, and prophylactic bactrim. Patient was given heparin for DVT prophylaxis. PT evaluated patient and recommended discharge to home with PT with no skilled PT.  Surgical bone pathology returned with clean margins, with no osteomyelitis.  Antibiotics were discontinued. Patient was medically optimized and improved clinically throughout hospital course. Patient seen and examined on day of discharge. Patient is medically stable for discharge to home with close outpatient follow up with wound care, Dr. Araiza this Friday.        ---    CONSULTANTS:     Podiatry: Dr. Araiza    Cardiology: Dr. Larson     Endocrinology: Dr. Perlman     Infectious Disease: Dr. Saucedo	    Nephrology: Dr. Hancock        ---    TIME SPENT:    I, the attending physician, was physically present for the key portions of the evaluation and management (E/M) service provided. The total amount of time spent reviewing the hospital notes, laboratory values, imaging findings, assessing/counseling the patient, discussing with consultant physicians, social work, nursing staff was -- minutes HPI:    53 yo M with PMHx of LDRT / right renal transplant for CKD (2013), T1DM on insulin pump, HTN, HLD, [place]engberg CVA (2015) - residual deficits of loss of pain and temperature sensation in left arm and right face, hx of DVT, 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 Noticed the wounds to the distal L 3rd digit about 2 months ago. Patient has been applying collagenase but showed no improvement. Has been seeing a podiatrist outpatient but wounds are not healing so was sent to wound care for additional management. Sent in by Dr. Araiza today to r/o osteomyelitis. & Possible surgery/amputation of left foot 3rd toe distal digit & Rt foot 2nd toe distal digit . Patient also has a burn rash on his chest that he acquired while cooking with oil 3 weeks ago. Scheduled for MRI of left foot but patient states he is unable to have MRI due to having metal coils in his left inner ear. Denies fevers, chills, SOB, chest pain, pain in distal digital wounds.        In the ED, VS T98.8F HR 64 /71 RR 18 SpO2 94% on RA. Labs significant for mildly elevated ESR 25, normal WBC 7.44, BUN/Cr 26/1.40,     Received Vanco and Zosyn x1 in ED.    CXR showing mildly elevated R hemidiaphragm. No focal consolidation.    EKG showing 1st degree AV block (26 Aug 2020 12:52)            ---    HOSPITAL COURSE: Patient was admitted for osteomyelitis of toe. Cardiology was consulted for clearance prior to surgery. Patient had  left 3rd toe distal digit partial amputation and right  foot distal digit 2nd digit on 8/27/20 with no complications. Tissue culture with no growth to date. Patient was given rocephin IV for a total of 6 days. Patient was continued on home blood pressure medications Cozaar, clonidine, metoprolol, and hydralazine with hold parameters. Patient was also continued on azathioprine, tacrolimus, and prophylactic bactrim for LDRT at [facility]. Patient was given heparin for DVT prophylaxis. PT evaluated patient and recommended discharge to home with PT with no skilled PT.  Surgical bone pathology returned with clean margins, with no osteomyelitis.  Antibiotics were discontinued. Patient was medically optimized and improved clinically throughout hospital course. Patient seen and examined on day of discharge. Patient is medically stable for discharge to home with close outpatient follow up with wound care, Dr. Araiza this Friday. As per ID - No need for Abx .        ---    CONSULTANTS:     Podiatry: Dr. Araiza    Cardiology: Dr. Larson     Endocrinology: Dr. Perlman     Infectious Disease: Dr. Saucedo	    Nephrology: Dr. Hancock        ---    TIME SPENT:    I, the attending physician, was physically present for the key portions of the evaluation and management (E/M) service provided. The total amount of time spent reviewing the hospital notes, laboratory values, imaging findings, assessing/counseling the patient, discussing with consultant physicians, social work, nursing staff was -- minutes

## 2020-08-29 NOTE — PROGRESS NOTE ADULT - PROBLEM SELECTOR PLAN 5
chronic, on insulin pump- FS q AC HS  -Pat Weil diabetes educator to see patient  -Endo Dr. Perlman consulted, recs appreciated; pump settings adjusted for temp basal 70% for next 20 hrs, goal bg 100-180 in hosp setting.  -A1C 8.2,  was 7.1 in 12/2019  -Nutritional Eval., D/W Pat weil.  -blood sugar low this morning, improved after drinking juice, continue to monitor

## 2020-08-29 NOTE — DISCHARGE NOTE PROVIDER - CARE PROVIDERS DIRECT ADDRESSES
,DirectAddress_Unknown ,DirectAddress_Unknown,[email or web address]

## 2020-08-29 NOTE — DISCHARGE NOTE PROVIDER - NSDCCPCAREPLAN_GEN_ALL_CORE_FT
PRINCIPAL DISCHARGE DIAGNOSIS  Diagnosis: Diabetic ulcer of toe  Assessment and Plan of Treatment: -you were sent by Dr. Araiza for concern of osteomyelitis   -Xray showed erosion in the left third distal phalanx  -you had a partial amputation of your left 3rd digit and right 2nd digit      SECONDARY DISCHARGE DIAGNOSES  Diagnosis: Hypertension  Assessment and Plan of Treatment: You were continued on your home blood pressure medications  -please continue to take your metoprolol, cozaar, hydralazine, and clonidine as prescribed    Diagnosis: Hyperlipidemia  Assessment and Plan of Treatment: Hyperlipidemia    Diagnosis: Osteomyelitis  Assessment and Plan of Treatment: You had an infection of the bone in your left 3rd digit and right 2nd digit  -you were given rocephin which is an antibiotic    Diagnosis: Diabetes mellitus  Assessment and Plan of Treatment: Diabetes mellitus    Diagnosis: Renal transplant recipient  Assessment and Plan of Treatment: Renal transplant recipient    Diagnosis: CVA (cerebral vascular accident)  Assessment and Plan of Treatment: CVA (cerebral vascular accident) PRINCIPAL DISCHARGE DIAGNOSIS  Diagnosis: Diabetic ulcer of toe  Assessment and Plan of Treatment: -you were sent by Dr. Araiza for concern of osteomyelitis   -Xray showed erosion in the left third distal phalanx  -you had a partial amputation of your left 3rd digit and right 2nd digit  -please follow up with your PCP within 1 week of discharge      SECONDARY DISCHARGE DIAGNOSES  Diagnosis: Hypertension  Assessment and Plan of Treatment: You were continued on your home blood pressure medications  -please continue to take your metoprolol, cozaar, hydralazine, and clonidine as prescribed    Diagnosis: Hyperlipidemia  Assessment and Plan of Treatment: Continue to take home statin and aspirin once discharged    Diagnosis: Osteomyelitis  Assessment and Plan of Treatment: You had an infection of the bone in your left 3rd digit and right 2nd digit  -you were given rocephin which is an antibiotic    Diagnosis: Diabetes mellitus  Assessment and Plan of Treatment: Diabetes mellitus    Diagnosis: Renal transplant recipient  Assessment and Plan of Treatment: Renal transplant recipient    Diagnosis: CVA (cerebral vascular accident)  Assessment and Plan of Treatment: CVA (cerebral vascular accident) PRINCIPAL DISCHARGE DIAGNOSIS  Diagnosis: Diabetic ulcer of toe  Assessment and Plan of Treatment: -you were sent by Dr. Araiza for concern of osteomyelitis   -Xray showed erosion in the left third distal phalanx  -you had a partial amputation of your left 3rd digit and right 2nd digit  -please follow up with your PCP within 1 week of discharge      SECONDARY DISCHARGE DIAGNOSES  Diagnosis: CVA (cerebral vascular accident)  Assessment and Plan of Treatment: CVA (cerebral vascular accident)    Diagnosis: Renal transplant recipient  Assessment and Plan of Treatment: Renal transplant recipient    Diagnosis: Diabetes mellitus  Assessment and Plan of Treatment: Diabetes mellitus    Diagnosis: Hyperlipidemia  Assessment and Plan of Treatment: Continue home medication statin and aspirin once discharged    Diagnosis: Hypertension  Assessment and Plan of Treatment: You were continued on your home blood pressure medications  -Continue home medication metoprolol, cozaar, hydralazine, and clonidine as prescribed    Diagnosis: Osteomyelitis  Assessment and Plan of Treatment: You had an infection of the bone in your left 3rd digit and right 2nd digit  -you were given rocephin which is an antibiotic PRINCIPAL DISCHARGE DIAGNOSIS  Diagnosis: Diabetic ulcer of toe  Assessment and Plan of Treatment: -you were sent by Dr. Araiza for concern of osteomyelitis   -Xray showed erosion in the left third distal phalanx  -you had a partial amputation of your left 3rd digit and right 2nd digit.   -please follow up with your PCP within 1 week of discharge      SECONDARY DISCHARGE DIAGNOSES  Diagnosis: CVA (cerebral vascular accident)  Assessment and Plan of Treatment: CVA (cerebral vascular accident)  -continue home aspirin 325 mg   -continue home gabapentin for residual neuropathic facial pain.    Diagnosis: Renal transplant recipient  Assessment and Plan of Treatment: Renal transplant recipient    Diagnosis: Diabetes mellitus  Assessment and Plan of Treatment: Diabetes mellitus    Diagnosis: Hyperlipidemia  Assessment and Plan of Treatment: Continue home medication statin and aspirin once discharged    Diagnosis: Hypertension  Assessment and Plan of Treatment: You were continued on your home blood pressure medications  -Continue home medication metoprolol, cozaar, hydralazine, and clonidine as prescribed    Diagnosis: Osteomyelitis  Assessment and Plan of Treatment: You had an infection of the bone in your left 3rd digit and right 2nd digit  -you were given rocephin which is an antibiotic  - you had partial amputation of left 3rd digit and right 2nd digit. Lab report showed no active osteomyelitis in healthy bone. PRINCIPAL DISCHARGE DIAGNOSIS  Diagnosis: Diabetic ulcer of toe  Assessment and Plan of Treatment: -you were sent by Dr. Araiza for concern of osteomyelitis   -Xray showed erosion in the left third distal phalanx  -you had a partial amputation of your left 3rd digit and right 2nd digit.   -please follow up with your PCP within 1 week of discharge      SECONDARY DISCHARGE DIAGNOSES  Diagnosis: CVA (cerebral vascular accident)  Assessment and Plan of Treatment: CVA (cerebral vascular accident)  -continue home aspirin 325 mg   -continue home gabapentin for residual neuropathic facial pain.    Diagnosis: Renal transplant recipient  Assessment and Plan of Treatment: Renal transplant recipient    Diagnosis: Diabetes mellitus  Assessment and Plan of Treatment: Diabetes mellitus  -you were educated on proper usage of insulin pump and dosing.   -please follow up with endocrinologist.    Diagnosis: Hyperlipidemia  Assessment and Plan of Treatment: Continue home medication statin and aspirin once discharged    Diagnosis: Hypertension  Assessment and Plan of Treatment: You were continued on your home blood pressure medications  -Continue home medication metoprolol, hydralazine, and clonidine as prescribed  -Your cozaar prescription was increased from 25mg to 50mg.   -Please follow up with your PCP in one week.    Diagnosis: Osteomyelitis  Assessment and Plan of Treatment: You had an infection of the bone in your left 3rd digit and right 2nd digit  -you were given rocephin which is an antibiotic  - you had partial amputation of left 3rd digit and right 2nd digit. Lab report showed no active osteomyelitis in healthy bone. PRINCIPAL DISCHARGE DIAGNOSIS  Diagnosis: Diabetic ulcer of toe  Assessment and Plan of Treatment: -you were sent by Dr. Araiza for concern of osteomyelitis of left foot 3rd toe Ulcer , Surgical  Pathology confirms OM   -DO NOT wet your dressing of both foot, use  surgical shoe to ambulate.  -Follow up with Dr Araiza this week.  -Xray showed erosion in the left third distal phalanx  -you had a partial amputation of your left 3rd digit and right 2nd digit.   -please follow up with your PCP within 1 week of discharge      SECONDARY DISCHARGE DIAGNOSES  Diagnosis: CVA (cerebral vascular accident)  Assessment and Plan of Treatment: CVA (cerebral vascular accident)  -continue home aspirin 325 mg   -continue home gabapentin for residual neuropathic facial pain.    Diagnosis: Renal transplant recipient  Assessment and Plan of Treatment: LDRT  at [facility] Renal transplant recipient, Continue your meds  Bactrin, Tacrolimus,, Azathioprin    Diagnosis: Diabetes mellitus  Assessment and Plan of Treatment: Diabetes mellitus Type 1 with Insulin Pump   -you were educated on proper usage of insulin pump and dosing to avoid hypogylcemia   -please follow up with endocrinologist.    Diagnosis: Hyperlipidemia  Assessment and Plan of Treatment: Continue home medication statin and aspirin once discharged    Diagnosis: Hypertension  Assessment and Plan of Treatment: You were continued on your home blood pressure medications  -Continue home medication metoprolol, hydralazine, and clonidine as prescribed  -Your Cozaar prescription was increased from 25mg to 50mg.   -Please follow up with your PCP in one week.    Diagnosis: Osteomyelitis  Assessment and Plan of Treatment: You had an infection of the bone in your left 3rd digit and right 2nd digit  -you were given  IV rocephin which is an antibiotic  - you had partial amputation of left 3rd digit and right 2nd digit.    Surgical -Patology  report showed no active osteomyelitis in healthy bone/ Clean margins.  Follow up with DR Araiza

## 2020-08-29 NOTE — DISCHARGE NOTE PROVIDER - CARE PROVIDER_API CALL
Kobe Miranda [address] [address]  Phone: [phone]  Fax: [phone]  Follow Up Time: Kobe Miranda  MEDICINE  [address] [address], [address]  [place] [address]  Phone: [phone]  Fax: [phone]  Follow Up Time:     Jasbir Garcia  ENDOCRINOLOGY/METAB/DIABETES  [address] [facility] [address], [address]  [place] [address]  Phone: [phone]  Fax: [phone]  Follow Up Time:     Kendall Araiza  PODIATRIC MEDICINE AND SURGERY  [address] [place] [address]  Phone: [phone]  Fax: [phone]  Follow Up Time: 1 week

## 2020-08-29 NOTE — DISCHARGE NOTE PROVIDER - NSDCFUADDINST_GEN_ALL_CORE_FT
Follow up with PMD in 1-2 weeks  Follow up with Dr Araiza this week  Follow up with Your Endocrinologist in 1-2 weeks   DO NOT Wet your dressing on B/L Lower EXT   Ambulate with surgical shoe

## 2020-08-29 NOTE — PROGRESS NOTE ADULT - PROBLEM SELECTOR PLAN 4
s/p right renal transplant for CKD (2013)  -continue home azathioprine and tacrolimus, bactrim   -Renal Dr Bryant follow up  -Daily BMP

## 2020-08-29 NOTE — PROGRESS NOTE ADULT - SUBJECTIVE AND OBJECTIVE BOX
Patient is a 54y year old Male seen at hospitals 1EAS 104 W1 s/p left foot partial 3rd digit amputation and right foot partial 2nd digit amputation (DOS: 8/27/2020) secondary to osteomyelitis. Patient was sitting in te bed side chair comfortably and was in NAD. Patient states he has been eating and drinking well without any complications. Patient had bowel movement this morning. Patient states he has been walking a few steps and is bearing weight on his heels and midfoot. Patient denies any pain to his both his feet.  Patient denies any fever, chills, nausea, vomiting, chest pain, shortness of breath, or calf pain at this time.    Denies any fever, chills, nausea, vomiting, chest pain, shortness of breath, or calf pain at this time.    Allergies    No Known Allergies    Intolerances        MEDICATIONS  (STANDING):  aspirin 325 milliGRAM(s) Oral daily  atorvastatin 10 milliGRAM(s) Oral at bedtime  azaTHIOprine 100 milliGRAM(s) Oral daily  cefTRIAXone   IVPB 2000 milliGRAM(s) IV Intermittent every 24 hours  cloNIDine 0.1 milliGRAM(s) Oral every 12 hours  dextrose 5%. 1000 milliLiter(s) (50 mL/Hr) IV Continuous <Continuous>  dextrose 5%. 1000 milliLiter(s) (50 mL/Hr) IV Continuous <Continuous>  dextrose 50% Injectable 12.5 Gram(s) IV Push once  dextrose 50% Injectable 25 Gram(s) IV Push once  dextrose 50% Injectable 25 Gram(s) IV Push once  dextrose 50% Injectable 12.5 Gram(s) IV Push once  dextrose 50% Injectable 25 Gram(s) IV Push once  dextrose 50% Injectable 25 Gram(s) IV Push once  famotidine    Tablet 20 milliGRAM(s) Oral daily  gabapentin 300 milliGRAM(s) Oral two times a day  heparin   Injectable 5000 Unit(s) SubCutaneous every 8 hours  hydrALAZINE 25 milliGRAM(s) Oral every 8 hours  insulin lispro (HumaLOG) Pump 1 Each SubCutaneous Continuous Pump  losartan 50 milliGRAM(s) Oral daily  metoprolol tartrate 75 milliGRAM(s) Oral two times a day  tacrolimus 1.5 milliGRAM(s) Oral two times a day  trimethoprim   80 mG/sulfamethoxazole 400 mG 1 Tablet(s) Oral daily    MEDICATIONS  (PRN):  dextrose 40% Gel 15 Gram(s) Oral once PRN Blood Glucose LESS THAN 70 milliGRAM(s)/deciliter  dextrose 40% Gel 15 Gram(s) Oral once PRN Blood Glucose LESS THAN 70 milliGRAM(s)/deciliter  glucagon  Injectable 1 milliGRAM(s) IntraMuscular once PRN Glucose LESS THAN 70 milligrams/deciliter  glucagon  Injectable 1 milliGRAM(s) IntraMuscular once PRN Glucose LESS THAN 70 milligrams/deciliter      Vital Signs Last 24 Hrs  T(C): 36.3 (29 Aug 2020 04:51), Max: 37.2 (28 Aug 2020 20:23)  T(F): 97.4 (29 Aug 2020 04:51), Max: 99 (28 Aug 2020 20:23)  HR: 52 (29 Aug 2020 04:51) (52 - 100)  BP: 115/73 (29 Aug 2020 04:51) (115/73 - 163/69)  BP(mean): --  RR: 18 (29 Aug 2020 04:51) (16 - 18)  SpO2: 92% (29 Aug 2020 04:51) (92% - 93%)    PHYSICAL EXAM:  Vascular: DP & PT palpable bilaterally, Capillary refill 3 seconds, Digital hair absent bilaterally  Neurological: Light touch sensation diminished to the level of the lower third of the leg bilaterally   Musculoskeletal: 5/5 strength in all quadrants bilaterally, AJ & STJ ROM intact. Absence of left 4th digit and partial left 2nd digit secondary to amputation for bone infection   Dermatological: Skin of bilateral lower extremity is warm to touch. Sutures intact to bilateral surgical sites with no dehiscence, mild serosanguinous drianage, mild erythema noted to the right 2nd digit, no fluctuance, no proximal streaking.    CBC Full  -  ( 29 Aug 2020 06:26 )  WBC Count : 7.28 K/uL  RBC Count : 4.39 M/uL  Hemoglobin : 12.9 g/dL  Hematocrit : 38.8 %  Platelet Count - Automated : 210 K/uL  Mean Cell Volume : 88.4 fl  Mean Cell Hemoglobin : 29.4 pg  Mean Cell Hemoglobin Concentration : 33.2 gm/dL  Auto Neutrophil # : 4.50 K/uL  Auto Lymphocyte # : 1.60 K/uL  Auto Monocyte # : 0.73 K/uL  Auto Eosinophil # : 0.37 K/uL  Auto Basophil # : 0.06 K/uL  Auto Neutrophil % : 61.8 %  Auto Lymphocyte % : 22.0 %  Auto Monocyte % : 10.0 %  Auto Eosinophil % : 5.1 %  Auto Basophil % : 0.8 %      08-29    141  |  106  |  19  ----------------------------<  93  3.8   |  32<H>  |  1.30    Ca    8.7      29 Aug 2020 06:26    TPro  7.0  /  Alb  3.0<L>  /  TBili  0.5  /  DBili  x   /  AST  15  /  ALT  14  /  AlkPhos  106  08-29              Culture - Tissue with Gram Stain (collected 27 Aug 2020 22:01)  Source: .Tissue Other, distal left 3rd toe  Gram Stain (27 Aug 2020 23:26):    No polymorphonuclear cells seen per low power field    No organisms seen per oil power field  Preliminary Report (28 Aug 2020 15:54):    No growth    Culture - Tissue with Gram Stain (collected 27 Aug 2020 22:01)  Source: .Tissue Other, distal right 2nd toe  Gram Stain (27 Aug 2020 23:26):    No polymorphonuclear cells seen per low power field    No organisms seen per oil power field  Preliminary Report (28 Aug 2020 15:56):    No growth    Culture - Blood (collected 26 Aug 2020 16:46)  Source: .Blood Blood  Preliminary Report (27 Aug 2020 17:01):    No growth to date.    Culture - Blood (collected 26 Aug 2020 16:46)  Source: .Blood Blood  Preliminary Report (27 Aug 2020 17:01):    No growth to date.        Imaging: ----------  < from: Xray Foot AP + Lateral + Oblique, Bilat (08.27.20 @ 14:50) >  EXAM:  FOOT BILATERAL (MINIMUM 3 V)                            PROCEDURE DATE:  08/27/2020          INTERPRETATION:  Bilateral feet    HISTORY: Postop    COMPARISON: 8/26/2020     Three views of the right foot show interval amputation of the distalhalf of the second distal phalanx.    Three views of the left foot show interval amputation of the third toe at the level of the middle of the third digit proximal phalanx.    IMPRESSION: Postoperative changes.    Thank you for this referral.      < end of copied text >

## 2020-08-29 NOTE — PROGRESS NOTE ADULT - ASSESSMENT
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 Admit for r/o osteomyelitis, s/p partial amputation L 3rd toe and R 2nd toe, POD#2.

## 2020-08-30 LAB
-  AMPICILLIN/SULBACTAM: SIGNIFICANT CHANGE UP
-  CEFAZOLIN: SIGNIFICANT CHANGE UP
-  CLINDAMYCIN: SIGNIFICANT CHANGE UP
-  ERYTHROMYCIN: SIGNIFICANT CHANGE UP
-  GENTAMICIN: SIGNIFICANT CHANGE UP
-  OXACILLIN: SIGNIFICANT CHANGE UP
-  PENICILLIN: SIGNIFICANT CHANGE UP
-  RIFAMPIN: SIGNIFICANT CHANGE UP
-  TETRACYCLINE: SIGNIFICANT CHANGE UP
-  TRIMETHOPRIM/SULFAMETHOXAZOLE: SIGNIFICANT CHANGE UP
-  VANCOMYCIN: SIGNIFICANT CHANGE UP
ALBUMIN SERPL ELPH-MCNC: 3.1 G/DL — LOW (ref 3.3–5)
ALP SERPL-CCNC: 110 U/L — SIGNIFICANT CHANGE UP (ref 40–120)
ALT FLD-CCNC: 15 U/L — SIGNIFICANT CHANGE UP (ref 12–78)
ANION GAP SERPL CALC-SCNC: 3 MMOL/L — LOW (ref 5–17)
AST SERPL-CCNC: 15 U/L — SIGNIFICANT CHANGE UP (ref 15–37)
BASOPHILS # BLD AUTO: 0.05 K/UL — SIGNIFICANT CHANGE UP (ref 0–0.2)
BASOPHILS NFR BLD AUTO: 0.6 % — SIGNIFICANT CHANGE UP (ref 0–2)
BILIRUB SERPL-MCNC: 0.5 MG/DL — SIGNIFICANT CHANGE UP (ref 0.2–1.2)
BUN SERPL-MCNC: 22 MG/DL — SIGNIFICANT CHANGE UP (ref 7–23)
CALCIUM SERPL-MCNC: 9.1 MG/DL — SIGNIFICANT CHANGE UP (ref 8.5–10.1)
CHLORIDE SERPL-SCNC: 105 MMOL/L — SIGNIFICANT CHANGE UP (ref 96–108)
CO2 SERPL-SCNC: 32 MMOL/L — HIGH (ref 22–31)
CREAT SERPL-MCNC: 1.2 MG/DL — SIGNIFICANT CHANGE UP (ref 0.5–1.3)
EOSINOPHIL # BLD AUTO: 0.37 K/UL — SIGNIFICANT CHANGE UP (ref 0–0.5)
EOSINOPHIL NFR BLD AUTO: 4.4 % — SIGNIFICANT CHANGE UP (ref 0–6)
GLUCOSE SERPL-MCNC: 88 MG/DL — SIGNIFICANT CHANGE UP (ref 70–99)
HCT VFR BLD CALC: 39 % — SIGNIFICANT CHANGE UP (ref 39–50)
HGB BLD-MCNC: 12.9 G/DL — LOW (ref 13–17)
IMM GRANULOCYTES NFR BLD AUTO: 0.4 % — SIGNIFICANT CHANGE UP (ref 0–1.5)
LYMPHOCYTES # BLD AUTO: 1.32 K/UL — SIGNIFICANT CHANGE UP (ref 1–3.3)
LYMPHOCYTES # BLD AUTO: 15.8 % — SIGNIFICANT CHANGE UP (ref 13–44)
MCHC RBC-ENTMCNC: 29.1 PG — SIGNIFICANT CHANGE UP (ref 27–34)
MCHC RBC-ENTMCNC: 33.1 GM/DL — SIGNIFICANT CHANGE UP (ref 32–36)
MCV RBC AUTO: 88 FL — SIGNIFICANT CHANGE UP (ref 80–100)
METHOD TYPE: SIGNIFICANT CHANGE UP
MONOCYTES # BLD AUTO: 0.73 K/UL — SIGNIFICANT CHANGE UP (ref 0–0.9)
MONOCYTES NFR BLD AUTO: 8.8 % — SIGNIFICANT CHANGE UP (ref 2–14)
NEUTROPHILS # BLD AUTO: 5.84 K/UL — SIGNIFICANT CHANGE UP (ref 1.8–7.4)
NEUTROPHILS NFR BLD AUTO: 70 % — SIGNIFICANT CHANGE UP (ref 43–77)
NRBC # BLD: 0 /100 WBCS — SIGNIFICANT CHANGE UP (ref 0–0)
PLATELET # BLD AUTO: 207 K/UL — SIGNIFICANT CHANGE UP (ref 150–400)
POTASSIUM SERPL-MCNC: 3.7 MMOL/L — SIGNIFICANT CHANGE UP (ref 3.5–5.3)
POTASSIUM SERPL-SCNC: 3.7 MMOL/L — SIGNIFICANT CHANGE UP (ref 3.5–5.3)
PROT SERPL-MCNC: 7.1 G/DL — SIGNIFICANT CHANGE UP (ref 6–8.3)
RBC # BLD: 4.43 M/UL — SIGNIFICANT CHANGE UP (ref 4.2–5.8)
RBC # FLD: 12.9 % — SIGNIFICANT CHANGE UP (ref 10.3–14.5)
SODIUM SERPL-SCNC: 140 MMOL/L — SIGNIFICANT CHANGE UP (ref 135–145)
WBC # BLD: 8.34 K/UL — SIGNIFICANT CHANGE UP (ref 3.8–10.5)
WBC # FLD AUTO: 8.34 K/UL — SIGNIFICANT CHANGE UP (ref 3.8–10.5)

## 2020-08-30 PROCEDURE — 99232 SBSQ HOSP IP/OBS MODERATE 35: CPT

## 2020-08-30 RX ADMIN — HEPARIN SODIUM 5000 UNIT(S): 5000 INJECTION INTRAVENOUS; SUBCUTANEOUS at 13:09

## 2020-08-30 RX ADMIN — TACROLIMUS 1.5 MILLIGRAM(S): 5 CAPSULE ORAL at 08:21

## 2020-08-30 RX ADMIN — GABAPENTIN 300 MILLIGRAM(S): 400 CAPSULE ORAL at 17:05

## 2020-08-30 RX ADMIN — Medication 0.1 MILLIGRAM(S): at 06:10

## 2020-08-30 RX ADMIN — HEPARIN SODIUM 5000 UNIT(S): 5000 INJECTION INTRAVENOUS; SUBCUTANEOUS at 21:15

## 2020-08-30 RX ADMIN — ATORVASTATIN CALCIUM 10 MILLIGRAM(S): 80 TABLET, FILM COATED ORAL at 21:14

## 2020-08-30 RX ADMIN — Medication 75 MILLIGRAM(S): at 17:00

## 2020-08-30 RX ADMIN — HEPARIN SODIUM 5000 UNIT(S): 5000 INJECTION INTRAVENOUS; SUBCUTANEOUS at 06:11

## 2020-08-30 RX ADMIN — Medication 1 TABLET(S): at 13:08

## 2020-08-30 RX ADMIN — Medication 25 MILLIGRAM(S): at 13:08

## 2020-08-30 RX ADMIN — LOSARTAN POTASSIUM 50 MILLIGRAM(S): 100 TABLET, FILM COATED ORAL at 06:11

## 2020-08-30 RX ADMIN — GABAPENTIN 300 MILLIGRAM(S): 400 CAPSULE ORAL at 06:10

## 2020-08-30 RX ADMIN — Medication 25 MILLIGRAM(S): at 06:10

## 2020-08-30 RX ADMIN — CEFTRIAXONE 100 MILLIGRAM(S): 500 INJECTION, POWDER, FOR SOLUTION INTRAMUSCULAR; INTRAVENOUS at 21:14

## 2020-08-30 RX ADMIN — Medication 325 MILLIGRAM(S): at 13:08

## 2020-08-30 RX ADMIN — TACROLIMUS 1.5 MILLIGRAM(S): 5 CAPSULE ORAL at 21:14

## 2020-08-30 RX ADMIN — Medication 0.1 MILLIGRAM(S): at 17:00

## 2020-08-30 RX ADMIN — AZATHIOPRINE 100 MILLIGRAM(S): 100 TABLET ORAL at 13:09

## 2020-08-30 RX ADMIN — FAMOTIDINE 20 MILLIGRAM(S): 10 INJECTION INTRAVENOUS at 13:09

## 2020-08-30 RX ADMIN — Medication 25 MILLIGRAM(S): at 21:14

## 2020-08-30 NOTE — PROGRESS NOTE ADULT - PROBLEM SELECTOR PLAN 1
sent in by Dr. Araiza wound care to r/o osteomyelitis, ESR mildly elevated to 25  -pt states he is unable to get MRI due to metals 2/2 inner ear surgery   -partial amputation of R 2nd toe and L 3rd toe POD#3, f/u path results  -admits to incentive spirometry use, encourage ambulation  -PT consulted; recs appreciated - no skilled PT needs at this time  -ID Dr. Del Valle consulted; recs appreciated  -Rocephin 2 gm IV Daily continue

## 2020-08-30 NOTE — PROGRESS NOTE ADULT - SUBJECTIVE AND OBJECTIVE BOX
Covering Dr Saucedo and Dr Maurizio NORIEGA, LUTHER is a 54yMale , patient examined and chart reviewed.       INTERVAL HPI/ OVERNIGHT EVENTS:   Afebrile No events. In chair.  Doing well.    PAST MEDICAL & SURGICAL HISTORY:  History of DVT (deep vein thrombosis)  Squamous cell carcinoma of skin of left ear  CVA (cerebral vascular accident)  Obesity (BMI 30-39.9)  Diabetic peripheral neuropathy  CKD (chronic kidney disease)  Diabetes mellitus  Hyperlipidemia  Hypertension  S/P kidney transplant  End-stage renal failure with renal transplant: 12/2013  H/O foot surgery: 2005 - right foot - bone fracture - s/p ORIF and subsequent removal of hardware  Vasectomy status: s/p b/l  vasectomy  Ear disease: Left inner ear surgery  Toe infection: 2007 L 4th Toe surgery-amputation secondary to osteomyelitis      For details regarding the patient's social history, family history, and other miscellaneous elements, please refer the initial infectious diseases consultation and/or the admitting history and physical examination for this admission.      ROS:  CONSTITUTIONAL:  Negative fever or chills  EYES:  Negative  blurry vision or double vision  CARDIOVASCULAR:  Negative for chest pain or palpitations  RESPIRATORY:  Negative for cough, wheezing, or SOB   GASTROINTESTINAL:  Negative for nausea, vomiting, diarrhea, constipation, or abdominal pain  GENITOURINARY:  Negative frequency, urgency or dysuria  NEUROLOGIC:  No headache, confusion, dizziness, lightheadedness  All other systems were reviewed and are negative         Current inpatient medications :    ANTIBIOTICS/RELEVANT:  cefTRIAXone   IVPB 2000 milliGRAM(s) IV Intermittent every 24 hours  trimethoprim   80 mG/sulfamethoxazole 400 mG 1 Tablet(s) Oral daily      aspirin 325 milliGRAM(s) Oral daily  atorvastatin 10 milliGRAM(s) Oral at bedtime  cloNIDine 0.1 milliGRAM(s) Oral every 12 hours  dextrose 40% Gel 15 Gram(s) Oral once PRN  dextrose 40% Gel 15 Gram(s) Oral once PRN  dextrose 5%. 1000 milliLiter(s) IV Continuous <Continuous>  dextrose 5%. 1000 milliLiter(s) IV Continuous <Continuous>  dextrose 50% Injectable 12.5 Gram(s) IV Push once  dextrose 50% Injectable 25 Gram(s) IV Push once  dextrose 50% Injectable 25 Gram(s) IV Push once  dextrose 50% Injectable 12.5 Gram(s) IV Push once  dextrose 50% Injectable 25 Gram(s) IV Push once  dextrose 50% Injectable 25 Gram(s) IV Push once  famotidine    Tablet 20 milliGRAM(s) Oral daily  gabapentin 300 milliGRAM(s) Oral two times a day  glucagon  Injectable 1 milliGRAM(s) IntraMuscular once PRN  glucagon  Injectable 1 milliGRAM(s) IntraMuscular once PRN  heparin   Injectable 5000 Unit(s) SubCutaneous every 8 hours  hydrALAZINE 25 milliGRAM(s) Oral every 8 hours  insulin lispro (HumaLOG) Pump 1 Each SubCutaneous Continuous Pump  losartan 50 milliGRAM(s) Oral daily  metoprolol tartrate 75 milliGRAM(s) Oral two times a day      Objective:  Vital Signs Last 24 Hrs  T(C): 37.1 (30 Aug 2020 16:50), Max: 37.1 (30 Aug 2020 16:50)  T(F): 98.8 (30 Aug 2020 16:50), Max: 98.8 (30 Aug 2020 16:50)  HR: 67 (30 Aug 2020 16:50) (57 - 72)  BP: 144/79 (30 Aug 2020 16:50) (129/75 - 156/81)  RR: 18 (30 Aug 2020 16:50) (18 - 18)  SpO2: 94% (30 Aug 2020 16:50) (94% - 97%)      Physical Exam:  General:  no acute distress  Eyes: sclera anicteric, pupils equal and reactive to light  ENMT: buccal mucosa moist, pharynx not injected  Neck: supple, trachea midline  Lungs: clear, no wheeze/rhonchi  Cardiovascular: regular rate and rhythm, S1 S2  Abdomen: soft, nontender, no organomegaly present, bowel sounds normal  Neurological: alert and oriented x3, Cranial Nerves II-XII grossly intact  Skin: no increased ecchymosis/petechiae/purpura  Lymph Nodes: no palpable cervical/supraclavicular lymph nodes enlargements  Extremities: Karlos foot drsg c/d/i      LABS:                        12.9   8.34  )-----------( 207      ( 30 Aug 2020 05:59 )             39.0   08-30    140  |  105  |  22  ----------------------------<  88  3.7   |  32<H>  |  1.20    Ca    9.1      30 Aug 2020 05:59    TPro  7.1  /  Alb  3.1<L>  /  TBili  0.5  /  DBili  x   /  AST  15  /  ALT  15  /  AlkPhos  110  08-30    MICROBIOLOGY:    Culture - Tissue with Gram Stain (collected 27 Aug 2020 22:01)  Source: .Tissue Other, distal left 3rd toe  Gram Stain (27 Aug 2020 23:26):    No polymorphonuclear cells seen per low power field    No organisms seen per oil power field  Preliminary Report (28 Aug 2020 15:54):    No growth    Culture - Tissue with Gram Stain (collected 27 Aug 2020 22:01)  Source: .Tissue Other, distal right 2nd toe  Gram Stain (27 Aug 2020 23:26):    No polymorphonuclear cells seen per low power field    No organisms seen per oil power field  Preliminary Report (28 Aug 2020 15:56):    No growth    Culture - Blood (collected 26 Aug 2020 16:46)  Source: .Blood Blood  Preliminary Report (27 Aug 2020 17:01):    No growth to date.    RADIOLOGY & ADDITIONAL STUDIES:      Assessment :  S/p left foot partial 3rd digit amputation and right foot partial 2nd digit amputation 8/27/2020 sec osteomyelitis. Tissue cultures ngtd. Overall stable.    Plan :   Cont Cefftriaxone 2 grams IV Q-day pending micro and path data.   Trend temps and cbc  Wound care per podiatry        Continue with present regiment.  Appropriate use of antibiotics and adverse effects reviewed.      I have discussed the above plan of care with patient in detail. He expressed understanding of the  treatment plan . Risks, benefits and alternatives discussed in detail. I have asked if he has any questions or concerns and appropriately addressed them to the best of my ability .    > 35 minutes were spent in direct patient care reviewing notes, medications ,labs data/ imaging , discussion with multidisciplinary team.    Thank you for allowing me to participate in care of your patient .    Krista Del Valle MD  Infectious Disease  [phone]

## 2020-08-30 NOTE — PROGRESS NOTE ADULT - PROBLEM SELECTOR PLAN 5
chronic, on insulin pump- FS q AC HS  -Pat Weil diabetes educator to see patient  -Endo Dr. Perlman consulted, recs appreciated; pump settings adjusted for temp basal 70% for next 20 hrs, goal bg 100-180 in hosp setting.  -A1C 8.2,  was 7.1 in 12/2019  -Nutritional Eval., D/W Pat weil.  -continue to monitor

## 2020-08-30 NOTE — PROGRESS NOTE ADULT - ASSESSMENT
54y old  Male who presents with a chief complaint of r/o osteomyelitis, schedules for L third digit amputation and partial right 2nd digit amputation.     Non-healing toe ulcers  - With concern for OM.  ID following  - s/p toe amputation left 3rd digit and right partial 2nd digit   - Tolerated procedure well with no obvious cardiac complications  - Pain control  - Follow ID./ortho recs    History of CVA   - Continue ASA and statin    HTN   - BP a bit high.  Losartan increased yesterday.  Monitor BP.  Further increase tomorrow if BP remains uncontrolled  - Continue home clonidine .1 BID, hydralazine 25 q8, lopressor 75 BID    Will continue to follow

## 2020-08-30 NOTE — PROGRESS NOTE ADULT - PROBLEM SELECTOR PLAN 3
- H/O Renal Transplant at [facility]- [place]  --Cr stable   -nephro Dr. Bryant following the case

## 2020-08-30 NOTE — PROGRESS NOTE ADULT - SUBJECTIVE AND OBJECTIVE BOX
CAPILLARY BLOOD GLUCOSE      POCT Blood Glucose.: 82 mg/dL (30 Aug 2020 07:44)  POCT Blood Glucose.: 122 mg/dL (29 Aug 2020 21:12)  POCT Blood Glucose.: 114 mg/dL (29 Aug 2020 16:40)  POCT Blood Glucose.: 85 mg/dL (29 Aug 2020 11:54)      Vital Signs Last 24 Hrs  T(C): 36.4 (30 Aug 2020 05:26), Max: 36.8 (29 Aug 2020 13:44)  T(F): 97.6 (30 Aug 2020 05:26), Max: 98.2 (29 Aug 2020 13:44)  HR: 57 (30 Aug 2020 05:26) (57 - 72)  BP: 139/56 (30 Aug 2020 05:26) (139/56 - 156/81)  BP(mean): --  RR: 18 (30 Aug 2020 05:26) (18 - 18)  SpO2: 97% (30 Aug 2020 05:26) (93% - 97%)    General: WN/WD NAD  Respiratory: CTA B/L  CV: RRR, S1S2, no murmurs, rubs or gallops  Abdominal: Soft, NT, ND +BS, Last BM  Extremities: le foot dsg intact     08-30    140  |  105  |  22  ----------------------------<  88  3.7   |  32<H>  |  1.20    Ca    9.1      30 Aug 2020 05:59    TPro  7.1  /  Alb  3.1<L>  /  TBili  0.5  /  DBili  x   /  AST  15  /  ALT  15  /  AlkPhos  110  08-30      atorvastatin 10 milliGRAM(s) Oral at bedtime  dextrose 40% Gel 15 Gram(s) Oral once PRN  dextrose 40% Gel 15 Gram(s) Oral once PRN  dextrose 50% Injectable 12.5 Gram(s) IV Push once  dextrose 50% Injectable 25 Gram(s) IV Push once  dextrose 50% Injectable 25 Gram(s) IV Push once  dextrose 50% Injectable 12.5 Gram(s) IV Push once  dextrose 50% Injectable 25 Gram(s) IV Push once  dextrose 50% Injectable 25 Gram(s) IV Push once  glucagon  Injectable 1 milliGRAM(s) IntraMuscular once PRN  glucagon  Injectable 1 milliGRAM(s) IntraMuscular once PRN  insulin lispro (HumaLOG) Pump 1 Each SubCutaneous Continuous Pump

## 2020-08-30 NOTE — PROGRESS NOTE ADULT - PROBLEM SELECTOR PLAN 7
chronic, initial BP wnl  -continue home clonidine, and metoprolol  -losartan increased to 50 mg qd as per cardio - monitor renal indices

## 2020-08-30 NOTE — PROGRESS NOTE ADULT - SUBJECTIVE AND OBJECTIVE BOX
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 Noticed the wounds to the distal L 3rd digit about 2 months ago. Patient has been applying collagenase but showed no improvement. Has been seeing a podiatrist outpatient but wounds are not healing so was sent to wound care for additional management. Sent in by Dr. Araiza today to r/o osteomyelitis. & Possible surgery/amputation of left foot 3rd toe distal digit & Rt foot 2nd toe distal digit . Patient also has a burn rash on his chest that he acquired while cooking with oil 3 weeks ago. Scheduled for MRI of left foot but patient states he is unable to have MRI due to having metal coils in his left inner ear. Denies fevers, chills, SOB, chest pain, pain in distal digital wounds.    In the ED, VS T98.8F HR 64 /71 RR 18 SpO2 94% on RA. Labs significant for mildly elevated ESR 25, normal WBC 7.44, BUN/Cr 26/1.40,   Received Vanco and Zosyn x1 in ED.  CXR showing mildly elevated R hemidiaphragm. No focal consolidation.  EKG showing 1st degree AV block  -Pt seen by ID Dr Saucedo, On IV Abx     INTERVAL HPI: 8/27/20: Patient seen and examined at bedside. No acute overnight events. Patient denies any foot pain, extremity tingling, changes in vision, chest pain, SOB, palpitations, nausea, vomiting, hematuria, urinary incontinence, or blood in stools. Patient says he was able to sleep well overnight as well as ambulate to use the bathroom. Pt is currently awaiting surgery for the amputation of L 3rd digit and R 2nd digit. On IV Abx     8/28/20: Patient seen and examined at bedside POD 1 s/p partial amputation of R 2nd toe and L 3rd toe. No acute over night events. Patient admits to resting in bed and "staying off his feet" since the surgery. Patient has been using incentive spirometry at short intervals as well as urination in a urinal. Patient denies any bowel movements since yesterday but admits to passage of gas. Patient  denies any foot pain, extremity tingling, changes in vision, chest pain, SOB, palpitations, nausea, vomiting, abdominal pain, or hematuria. Patient had no difficulty with sleep or pain over night. No acute complaints. PT eval   8/29/20: Patient seen and examined at bedside POD 2 s/p partial amputation of R 2nd toe and L 3rd toe. No acute over night events. Patient states he had BM this morning. Eating and drinking well. Patient denies having any lower extremity pain. Offers no acute complaints.   8/30/20: Patient seen and examined at bedside POD 3 s/p partial amputation of R 2nd toe and L 3rd toe. No acute overnight events. Patient offers no acute complaints. Sitting comfortably in bed. Patient's family brought CPAP yesterday which patient used over night.   OVERNIGHT EVENTS: none    Home Medications:  aspirin 325 mg oral tablet: 1 tab(s) orally once a day (26 Aug 2020 12:06)  Cozaar 25 mg oral tablet: 1 tab(s) orally once a day (26 Aug 2020 12:06)  famotidine 20 mg oral tablet: 1 tab(s) orally once a day (26 Aug 2020 12:06)  gabapentin 300 mg oral capsule: 1 cap(s) orally 2 times a day (26 Aug 2020 12:06)  lovastatin 40 mg oral tablet: 1 tab(s) orally once a day (26 Aug 2020 12:06)  Metoprolol Tartrate 25 mg oral tablet: 3 tab(s) orally 2 times a day    *conf. with patient and pharmacy. 75mg dose, BID* (26 Aug 2020 12:06)  tacrolimus 0.5 mg oral capsule: 3 cap(s) orally 2 times a day (26 Aug 2020 12:06)      MEDICATIONS  (STANDING):  aspirin 325 milliGRAM(s) Oral daily  atorvastatin 10 milliGRAM(s) Oral at bedtime  azaTHIOprine 100 milliGRAM(s) Oral daily  cefTRIAXone   IVPB 2000 milliGRAM(s) IV Intermittent every 24 hours  cloNIDine 0.1 milliGRAM(s) Oral every 12 hours  dextrose 5%. 1000 milliLiter(s) (50 mL/Hr) IV Continuous <Continuous>  dextrose 5%. 1000 milliLiter(s) (50 mL/Hr) IV Continuous <Continuous>  dextrose 50% Injectable 12.5 Gram(s) IV Push once  dextrose 50% Injectable 25 Gram(s) IV Push once  dextrose 50% Injectable 25 Gram(s) IV Push once  dextrose 50% Injectable 12.5 Gram(s) IV Push once  dextrose 50% Injectable 25 Gram(s) IV Push once  dextrose 50% Injectable 25 Gram(s) IV Push once  famotidine    Tablet 20 milliGRAM(s) Oral daily  gabapentin 300 milliGRAM(s) Oral two times a day  heparin   Injectable 5000 Unit(s) SubCutaneous every 8 hours  hydrALAZINE 25 milliGRAM(s) Oral every 8 hours  insulin lispro (HumaLOG) Pump 1 Each SubCutaneous Continuous Pump  losartan 25 milliGRAM(s) Oral daily  metoprolol tartrate 75 milliGRAM(s) Oral two times a day  tacrolimus 1.5 milliGRAM(s) Oral two times a day  trimethoprim   80 mG/sulfamethoxazole 400 mG 1 Tablet(s) Oral daily    MEDICATIONS  (PRN):  dextrose 40% Gel 15 Gram(s) Oral once PRN Blood Glucose LESS THAN 70 milliGRAM(s)/deciliter  dextrose 40% Gel 15 Gram(s) Oral once PRN Blood Glucose LESS THAN 70 milliGRAM(s)/deciliter  glucagon  Injectable 1 milliGRAM(s) IntraMuscular once PRN Glucose LESS THAN 70 milligrams/deciliter  glucagon  Injectable 1 milliGRAM(s) IntraMuscular once PRN Glucose LESS THAN 70 milligrams/deciliter      Allergies    No Known Allergies    Intolerances      REVIEW OF SYSTEMS:i feel fine.  CONSTITUTIONAL: No fever, No chills, No fatigue, No myalgia, No Body ache, No Weakness  EYES: No eye pain,  No visual disturbances, No discharge, NO Redness  ENMT:  No ear pain, No nose bleed, No vertigo; No sinus or throat pain, No Congestion  NECK: No pain, No stiffness  RESPIRATORY: No cough, wheezing, No  hemoptysis, No shortness of breath  CARDIOVASCULAR: No chest pain, palpitations  GASTROINTESTINAL: No abdominal or epigastric pain. No nausea, No vomiting; No diarrhea or constipation. [x  ] BM  GENITOURINARY: No dysuria, No frequency, No urgency, No hematuria, or incontinence  NEUROLOGICAL: +decreased sensation in BLE L>R, No headaches, No dizziness, No tingling, No tremors, No weakness  EXTREMITIES: +swelling, denies Pain  SKIN: + ulcers in both legs, +dry skin, +redness  MUSCULOSKELETAL: No joint pain or swelling; No muscle pain, No back pain, No extremity pain  PSYCHIATRIC: No depression, anxiety, mood swings or difficulty sleeping at night  PAIN SCALE: [ x ] None  [  ] Other-  ROS Unable to obtain due to - [  ] Dementia  [  ] Lethargy  [  ] Sedated [  ] non verbal  REST OF REVIEW Of SYSTEM - [ x ] Normal     Vital Signs Last 24 Hrs  T(C): 36.3 (29 Aug 2020 04:51), Max: 37.2 (28 Aug 2020 20:23)  T(F): 97.4 (29 Aug 2020 04:51), Max: 99 (28 Aug 2020 20:23)  HR: 52 (29 Aug 2020 04:51) (52 - 100)  BP: 115/73 (29 Aug 2020 04:51) (115/73 - 163/69)  BP(mean): --  RR: 18 (29 Aug 2020 04:51) (16 - 18)  SpO2: 92% (29 Aug 2020 04:51) (92% - 93%)      08-27 @ 07:01 - 08-28 @ 07:00  --------------------------------------------------------  IN: 1305 mL / OUT: 1650 mL / NET: -345 mL    08-28 @ 07:01 - 08-28 @ 13:23  --------------------------------------------------------  IN: 0 mL / OUT: 800 mL / NET: -800 mL        PHYSICAL EXAM:  GENERAL:  [x  ] NAD, [ x ] Well appearing, [  ] Agitated, [  ] Drowsy, [  ] Lethargy, [  ] Confused   HEAD:  [x  ] Normal, [  ] Other  EYES:  [x  ] EOMI, [x  ] PERRLA, [x  ] Conjunctiva and sclera clear normal, [  ] Other, [  ] Pallor, [  ] Discharge  ENMT:  [ x ] Normal, [x  ] Moist mucous membranes, [x  ] Good dentition, [ x ] No thrush  NECK:  [ x ] Supple, [x  ] No JVD, [ x ] Normal thyroid, [  ] Lymphadenopathy, [  ] Other  CHEST/LUNG:  [ x ] Clear to auscultation bilaterally, [ x ] Breath Sounds equal B/L / decreased, [  ] Poor effort, [ x ] No rales, [ x ] No rhonchi, [x  ] No wheezing  HEART:  [ x ] Regular rate and rhythm, [  ] Tachycardia, [ x ] Bradycardia, [  ] Irregular, [ x ] No murmurs, No rubs, No gallops, [  ] PPM in place (Mfr:  )  ABDOMEN:  [ x ] Soft, [ x ] Nontender, [x  ] Nondistended, [x  ] No mass, [ + ] Bowel sounds present, [+  ] Obese + Scar   NERVOUS SYSTEM:  [ x ] Alert & Oriented x3, [x  ] Nonfocal, [  ] Confusion, [  ] Encephalopathic, [  ] Sedated, [  ] Unable to assess, [  ] Dementia, [  ] Other-  EXTREMITIES:  [ x ] 2+ Peripheral Pulses, No clubbing, No cyanosis,  [ x ] +2 Edema B/L lower EXT R>L, chronic [ x ] PVD stasis skin changes B/L lower EXT, [ x ] Wound, post-op dressing c/d/i  LYMPH:  No lymphadenopathy noted  SKIN:  Lesion on R lower extremity, [ x ] Pressure ulcers, [ x ] Abrasion rt lower leg Ecchymosis, [  ] Skin tears, [x  ] Other      DIET: Regular diet    LABS:  cret                        12.9   7.28  )-----------( 210      ( 29 Aug 2020 06:26 )             38.8     08-29    141  |  106  |  19  ----------------------------<  93  3.8   |  32<H>  |  1.30    Ca    8.7      29 Aug 2020 06:26    TPro  7.0  /  Alb  3.0<L>  /  TBili  0.5  /  DBili  x   /  AST  15  /  ALT  14  /  AlkPhos  106  08-29    Culture Results:   No growth to date. (08-26 @ 16:46)  Culture Results:   No growth to date. (08-26 @ 16:46)      culture blood  -- .Tissue Other, distal right 2nd toe 08-27 @ 22:01    culture urine  --  08-27 @ 22:01  culture blood  -- .Blood Blood 08-26 @ 16:46    culture urine  --  08-26 @ 16:46    Culture - Tissue with Gram Stain (collected 27 Aug 2020 22:01)  Source: .Tissue Other, distal left 3rd toe  Gram Stain (27 Aug 2020 23:26):    No polymorphonuclear cells seen per low power field    No organisms seen per oil power field    Culture - Tissue with Gram Stain (collected 27 Aug 2020 22:01)  Source: .Tissue Other, distal right 2nd toe  Gram Stain (27 Aug 2020 23:26):    No polymorphonuclear cells seen per low power field    No organisms seen per oil power field    Culture - Blood (collected 26 Aug 2020 16:46)  Source: .Blood Blood  Preliminary Report (27 Aug 2020 17:01):    No growth to date.    Culture - Blood (collected 26 Aug 2020 16:46)  Source: .Blood Blood  Preliminary Report (27 Aug 2020 17:01):    No growth to date.         RADIOLOGY & ADDITIONAL TESTS: NONE      HEALTH ISSUES - PROBLEM Dx:  Type 1 diabetes mellitus with stage 5 chronic kidney disease not on chronic dialysis: Type 1 diabetes mellitus with stage 5 chronic kidney disease not on chronic dialysis  Osteomyelitis: Osteomyelitis  Need for prophylactic measure: Need for prophylactic measure  Hypertension: Hypertension  Hyperlipidemia: Hyperlipidemia  Diabetes mellitus: Diabetes mellitus  Renal transplant recipient: Renal transplant recipient  CKD (chronic kidney disease): CKD (chronic kidney disease)  Diabetic peripheral neuropathy: Diabetic peripheral neuropathy  CVA (cerebral vascular accident): CVA (cerebral vascular accident)  Diabetic ulcer of toe: Diabetic ulcer of toe  Diabetic ulcer of toe of left foot: Diabetic ulcer of toe of left foot      Consultant(s) Notes Reviewed:  [ x ] YES     Care Discussed with [X] Consultants  [ x ] Patient  [  ] Family  [  ]   [  ] Social Service  [ x ] RN, [  ] Physical Therapy  DVT PPX: [  ] Lovenox, [ x ] S C Heparin, [  ] Coumadin, [  ] Xarelto, [  ] Eliquis, [  ] Pradaxa, [  ] IV Heparin drip, [  ] SCD [  ] Contraindication 2 to GI Bleed,[  ] Ambulation  Advanced directive: [ x ] None, [  ] DNR/DNI Patient is a 54y old  Male who presents with a chief complaint of diabetic ulcers, admitted to r/o osteomyelitis  55 yo M with PMHx of LDRT / right renal transplant for CKD (2013), T1DM on insulin pump, HTN, HLD, [place] CVA (2015) - residual deficits of loss of pain and temperature sensation in left arm and right face, hx of DVT, 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 Noticed the wounds to the distal L 3rd digit about 2 months ago. Patient has been applying collagenase but showed no improvement. Has been seeing a podiatrist outpatient but wounds are not healing so was sent to wound care for additional management. Sent in by Dr. Araiza today to r/o osteomyelitis. & Possible surgery/amputation of left foot 3rd toe distal digit & Rt foot 2nd toe distal digit . Patient also has a burn rash on his chest that he acquired while cooking with oil 3 weeks ago. Scheduled for MRI of left foot but patient states he is unable to have MRI due to having metal coils in his left inner ear. Denies fevers, chills, SOB, chest pain, pain in distal digital wounds.    In the ED, VS T98.8F HR 64 /71 RR 18 SpO2 94% on RA. Labs significant for mildly elevated ESR 25, normal WBC 7.44, BUN/Cr 26/1.40,   Received Vanco and Zosyn x1 in ED.  CXR showing mildly elevated R hemidiaphragm. No focal consolidation.  EKG showing 1st degree AV block  -Pt seen by ID Dr Saucedo, On IV Abx     INTERVAL HPI: 8/27/20: Patient seen and examined at bedside. No acute overnight events. Patient denies any foot pain, extremity tingling, changes in vision, chest pain, SOB, palpitations, nausea, vomiting, hematuria, urinary incontinence, or blood in stools. Patient says he was able to sleep well overnight as well as ambulate to use the bathroom. Pt is currently awaiting surgery for the amputation of L 3rd digit and R 2nd digit. On IV Abx     8/28/20: Patient seen and examined at bedside POD 1 s/p partial amputation of R 2nd toe and L 3rd toe. No acute over night events. Patient admits to resting in bed and "staying off his feet" since the surgery. Patient has been using incentive spirometry at short intervals as well as urination in a urinal. Patient denies any bowel movements since yesterday but admits to passage of gas. Patient  denies any foot pain, extremity tingling, changes in vision, chest pain, SOB, palpitations, nausea, vomiting, abdominal pain, or hematuria. Patient had no difficulty with sleep or pain over night. No acute complaints. PT eval   8/29/20: Patient seen and examined at bedside POD 2 s/p partial amputation of R 2nd toe and L 3rd toe. No acute over night events. Patient states he had BM this morning. Eating and drinking well. Patient denies having any lower extremity pain. Offers no acute complaints.   8/30/20: Patient seen and examined at bedside POD 3 s/p partial amputation of R 2nd toe and L 3rd toe. No acute overnight events. Patient offers no acute complaints. Sitting comfortably in bed. Patient's family brought CPAP yesterday which patient used over night.   OVERNIGHT EVENTS: none    Home Medications:  aspirin 325 mg oral tablet: 1 tab(s) orally once a day (26 Aug 2020 12:06)  Cozaar 25 mg oral tablet: 1 tab(s) orally once a day (26 Aug 2020 12:06)  famotidine 20 mg oral tablet: 1 tab(s) orally once a day (26 Aug 2020 12:06)  gabapentin 300 mg oral capsule: 1 cap(s) orally 2 times a day (26 Aug 2020 12:06)  lovastatin 40 mg oral tablet: 1 tab(s) orally once a day (26 Aug 2020 12:06)  Metoprolol Tartrate 25 mg oral tablet: 3 tab(s) orally 2 times a day    *conf. with patient and pharmacy. 75mg dose, BID* (26 Aug 2020 12:06)  tacrolimus 0.5 mg oral capsule: 3 cap(s) orally 2 times a day (26 Aug 2020 12:06)      MEDICATIONS  (STANDING):  aspirin 325 milliGRAM(s) Oral daily  atorvastatin 10 milliGRAM(s) Oral at bedtime  azaTHIOprine 100 milliGRAM(s) Oral daily  cefTRIAXone   IVPB 2000 milliGRAM(s) IV Intermittent every 24 hours  cloNIDine 0.1 milliGRAM(s) Oral every 12 hours  dextrose 5%. 1000 milliLiter(s) (50 mL/Hr) IV Continuous <Continuous>  dextrose 5%. 1000 milliLiter(s) (50 mL/Hr) IV Continuous <Continuous>  dextrose 50% Injectable 12.5 Gram(s) IV Push once  dextrose 50% Injectable 25 Gram(s) IV Push once  dextrose 50% Injectable 25 Gram(s) IV Push once  dextrose 50% Injectable 12.5 Gram(s) IV Push once  dextrose 50% Injectable 25 Gram(s) IV Push once  dextrose 50% Injectable 25 Gram(s) IV Push once  famotidine    Tablet 20 milliGRAM(s) Oral daily  gabapentin 300 milliGRAM(s) Oral two times a day  heparin   Injectable 5000 Unit(s) SubCutaneous every 8 hours  hydrALAZINE 25 milliGRAM(s) Oral every 8 hours  insulin lispro (HumaLOG) Pump 1 Each SubCutaneous Continuous Pump  losartan 25 milliGRAM(s) Oral daily  metoprolol tartrate 75 milliGRAM(s) Oral two times a day  tacrolimus 1.5 milliGRAM(s) Oral two times a day  trimethoprim   80 mG/sulfamethoxazole 400 mG 1 Tablet(s) Oral daily    MEDICATIONS  (PRN):  dextrose 40% Gel 15 Gram(s) Oral once PRN Blood Glucose LESS THAN 70 milliGRAM(s)/deciliter  dextrose 40% Gel 15 Gram(s) Oral once PRN Blood Glucose LESS THAN 70 milliGRAM(s)/deciliter  glucagon  Injectable 1 milliGRAM(s) IntraMuscular once PRN Glucose LESS THAN 70 milligrams/deciliter  glucagon  Injectable 1 milliGRAM(s) IntraMuscular once PRN Glucose LESS THAN 70 milligrams/deciliter      Allergies    No Known Allergies    Intolerances      REVIEW OF SYSTEMS:i feel fine.  CONSTITUTIONAL: No fever, No chills, No fatigue, No myalgia, No Body ache, No Weakness  EYES: No eye pain,  No visual disturbances, No discharge, NO Redness  ENMT:  No ear pain, No nose bleed, No vertigo; No sinus or throat pain, No Congestion  NECK: No pain, No stiffness  RESPIRATORY: No cough, wheezing, No  hemoptysis, No shortness of breath  CARDIOVASCULAR: No chest pain, palpitations  GASTROINTESTINAL: No abdominal or epigastric pain. No nausea, No vomiting; No diarrhea or constipation. [x  ] BM  GENITOURINARY: No dysuria, No frequency, No urgency, No hematuria, or incontinence  NEUROLOGICAL: +decreased sensation in BLE L>R, No headaches, No dizziness, No tingling, No tremors, No weakness  EXTREMITIES: +swelling, denies Pain  SKIN: + ulcers in both legs, +dry skin, +redness  MUSCULOSKELETAL: No joint pain or swelling; No muscle pain, No back pain, No extremity pain  PSYCHIATRIC: No depression, anxiety, mood swings or difficulty sleeping at night  PAIN SCALE: [ x ] None  [  ] Other-  ROS Unable to obtain due to - [  ] Dementia  [  ] Lethargy  [  ] Sedated [  ] non verbal  REST OF REVIEW Of SYSTEM - [ x ] Normal     Vital Signs Last 24 Hrs  T(C): 36.4 (30 Aug 2020 05:26), Max: 36.8 (29 Aug 2020 13:44)  T(F): 97.6 (30 Aug 2020 05:26), Max: 98.2 (29 Aug 2020 13:44)  HR: 57 (30 Aug 2020 05:26) (57 - 72)  BP: 139/56 (30 Aug 2020 05:26) (139/56 - 156/81)  BP(mean): --  RR: 18 (30 Aug 2020 05:26) (18 - 18)  SpO2: 97% (30 Aug 2020 05:26) (93% - 97%)      08-27 @ 07:01 - 08-28 @ 07:00  --------------------------------------------------------  IN: 1305 mL / OUT: 1650 mL / NET: -345 mL    08-28 @ 07:01 - 08-28 @ 13:23  --------------------------------------------------------  IN: 0 mL / OUT: 800 mL / NET: -800 mL        PHYSICAL EXAM:  GENERAL:  [x  ] NAD, [ x ] Well appearing, [  ] Agitated, [  ] Drowsy, [  ] Lethargy, [  ] Confused   HEAD:  [x  ] Normal, [  ] Other  EYES:  [x  ] EOMI, [x  ] PERRLA, [x  ] Conjunctiva and sclera clear normal, [  ] Other, [  ] Pallor, [  ] Discharge  ENMT:  [ x ] Normal, [x  ] Moist mucous membranes, [x  ] Good dentition, [ x ] No thrush  NECK:  [ x ] Supple, [x  ] No JVD, [ x ] Normal thyroid, [  ] Lymphadenopathy, [  ] Other  CHEST/LUNG:  [ x ] Clear to auscultation bilaterally, [ x ] Breath Sounds equal B/L / decreased, [  ] Poor effort, [ x ] No rales, [ x ] No rhonchi, [x  ] No wheezing  HEART:  [ x ] Regular rate and rhythm, [  ] Tachycardia, [ x ] Bradycardia, [  ] Irregular, [ x ] No murmurs, No rubs, No gallops, [  ] PPM in place (Mfr:  )  ABDOMEN:  [ x ] Soft, [ x ] Nontender, [x  ] Nondistended, [x  ] No mass, [ + ] Bowel sounds present, [+  ] Obese + Scar   NERVOUS SYSTEM:  [ x ] Alert & Oriented x3, [x  ] Nonfocal, [  ] Confusion, [  ] Encephalopathic, [  ] Sedated, [  ] Unable to assess, [  ] Dementia, [  ] Other-  EXTREMITIES:  [ x ] 2+ Peripheral Pulses, No clubbing, No cyanosis,  [ x ] +2 Edema B/L lower EXT R>L, chronic [ x ] PVD stasis skin changes B/L lower EXT, [ x ] Wound, post-op dressing c/d/i  LYMPH:  No lymphadenopathy noted  SKIN:  Lesion on R lower extremity, [ x ] Pressure ulcers, [ x ] Abrasion rt lower leg Ecchymosis, [  ] Skin tears, [x  ] Other      DIET: Regular diet      LABS:  cret                        12.9   8.34  )-----------( 207      ( 30 Aug 2020 05:59 )             39.0     08-30    140  |  105  |  22  ----------------------------<  88  3.7   |  32<H>  |  1.20    Ca    9.1      30 Aug 2020 05:59    TPro  7.1  /  Alb  3.1<L>  /  TBili  0.5  /  DBili  x   /  AST  15  /  ALT  15  /  AlkPhos  110  08-30    Culture Results:   No growth to date. (08-26 @ 16:46)  Culture Results:   No growth to date. (08-26 @ 16:46)      culture blood  -- .Tissue Other, distal right 2nd toe 08-27 @ 22:01    culture urine  --  08-27 @ 22:01  culture blood  -- .Blood Blood 08-26 @ 16:46    culture urine  --  08-26 @ 16:46    Culture - Tissue with Gram Stain (collected 27 Aug 2020 22:01)  Source: .Tissue Other, distal left 3rd toe  Gram Stain (27 Aug 2020 23:26):    No polymorphonuclear cells seen per low power field    No organisms seen per oil power field    Culture - Tissue with Gram Stain (collected 27 Aug 2020 22:01)  Source: .Tissue Other, distal right 2nd toe  Gram Stain (27 Aug 2020 23:26):    No polymorphonuclear cells seen per low power field    No organisms seen per oil power field    Culture - Blood (collected 26 Aug 2020 16:46)  Source: .Blood Blood  Preliminary Report (27 Aug 2020 17:01):    No growth to date.    Culture - Blood (collected 26 Aug 2020 16:46)  Source: .Blood Blood  Preliminary Report (27 Aug 2020 17:01):    No growth to date.         RADIOLOGY & ADDITIONAL TESTS: NONE      HEALTH ISSUES - PROBLEM Dx:  Type 1 diabetes mellitus with stage 5 chronic kidney disease not on chronic dialysis: Type 1 diabetes mellitus with stage 5 chronic kidney disease not on chronic dialysis  Osteomyelitis: Osteomyelitis  Need for prophylactic measure: Need for prophylactic measure  Hypertension: Hypertension  Hyperlipidemia: Hyperlipidemia  Diabetes mellitus: Diabetes mellitus  Renal transplant recipient: Renal transplant recipient  CKD (chronic kidney disease): CKD (chronic kidney disease)  Diabetic peripheral neuropathy: Diabetic peripheral neuropathy  CVA (cerebral vascular accident): CVA (cerebral vascular accident)  Diabetic ulcer of toe: Diabetic ulcer of toe  Diabetic ulcer of toe of left foot: Diabetic ulcer of toe of left foot      Consultant(s) Notes Reviewed:  [ x ] YES     Care Discussed with [X] Consultants  [ x ] Patient  [  ] Family  [  ]   [  ] Social Service  [ x ] RN, [  ] Physical Therapy  DVT PPX: [  ] Lovenox, [ x ] S C Heparin, [  ] Coumadin, [  ] Xarelto, [  ] Eliquis, [  ] Pradaxa, [  ] IV Heparin drip, [  ] SCD [  ] Contraindication 2 to GI Bleed,[  ] Ambulation  Advanced directive: [ x ] None, [  ] DNR/DNI

## 2020-08-30 NOTE — PROGRESS NOTE ADULT - SUBJECTIVE AND OBJECTIVE BOX
[facility] - Julisa Jansen Grossman, Luly, Karan Kelly Savella  Office Number:  [phone]    Patient resting comfortably in bed in NAD.  Laying flat with no respiratory distress.  No complaints of chest pain, dyspnea, palpitations, PND, or orthopnea.    F/U for:  HTN    MEDICATIONS  (STANDING):  aspirin 325 milliGRAM(s) Oral daily  atorvastatin 10 milliGRAM(s) Oral at bedtime  azaTHIOprine 100 milliGRAM(s) Oral daily  cefTRIAXone   IVPB 2000 milliGRAM(s) IV Intermittent every 24 hours  cloNIDine 0.1 milliGRAM(s) Oral every 12 hours  dextrose 5%. 1000 milliLiter(s) (50 mL/Hr) IV Continuous <Continuous>  dextrose 5%. 1000 milliLiter(s) (50 mL/Hr) IV Continuous <Continuous>  dextrose 50% Injectable 12.5 Gram(s) IV Push once  dextrose 50% Injectable 25 Gram(s) IV Push once  dextrose 50% Injectable 25 Gram(s) IV Push once  dextrose 50% Injectable 12.5 Gram(s) IV Push once  dextrose 50% Injectable 25 Gram(s) IV Push once  dextrose 50% Injectable 25 Gram(s) IV Push once  famotidine    Tablet 20 milliGRAM(s) Oral daily  gabapentin 300 milliGRAM(s) Oral two times a day  heparin   Injectable 5000 Unit(s) SubCutaneous every 8 hours  hydrALAZINE 25 milliGRAM(s) Oral every 8 hours  insulin lispro (HumaLOG) Pump 1 Each SubCutaneous Continuous Pump  losartan 50 milliGRAM(s) Oral daily  metoprolol tartrate 75 milliGRAM(s) Oral two times a day  tacrolimus 1.5 milliGRAM(s) Oral two times a day  trimethoprim   80 mG/sulfamethoxazole 400 mG 1 Tablet(s) Oral daily    MEDICATIONS  (PRN):  dextrose 40% Gel 15 Gram(s) Oral once PRN Blood Glucose LESS THAN 70 milliGRAM(s)/deciliter  dextrose 40% Gel 15 Gram(s) Oral once PRN Blood Glucose LESS THAN 70 milliGRAM(s)/deciliter  glucagon  Injectable 1 milliGRAM(s) IntraMuscular once PRN Glucose LESS THAN 70 milligrams/deciliter  glucagon  Injectable 1 milliGRAM(s) IntraMuscular once PRN Glucose LESS THAN 70 milligrams/deciliter      Allergies    No Known Allergies    Intolerances        Vital Signs Last 24 Hrs  T(C): 36.4 (30 Aug 2020 05:26), Max: 36.8 (29 Aug 2020 13:44)  T(F): 97.6 (30 Aug 2020 05:26), Max: 98.2 (29 Aug 2020 13:44)  HR: 57 (30 Aug 2020 05:26) (57 - 72)  BP: 139/56 (30 Aug 2020 05:26) (139/56 - 156/81)  BP(mean): --  RR: 18 (30 Aug 2020 05:26) (18 - 18)  SpO2: 97% (30 Aug 2020 05:26) (93% - 97%)    I&O's Summary    29 Aug 2020 07:01  -  30 Aug 2020 07:00  --------------------------------------------------------  IN: 600 mL / OUT: 0 mL / NET: 600 mL        ON EXAM:    General: NAD, awake and alert, oriented x 3  HEENT: Mucous membranes are moist, anicteric  Lungs: Non-labored, breath sounds are clear bilaterally, No wheezing, rales or rhonchi.  Decreased breath sounds b/l  Cardiovascular: Regular, S1 and S2, no murmurs, rubs, or gallops.  Distant  Gastrointestinal: Bowel Sounds present, soft, nontender.   Lymph: Minimal peripheral edema. No lymphadenopathy.  Skin: No rashes or ulcers  Psych:  Mood & affect appropriate      LABS: All Labs Reviewed:                        12.9   8.34  )-----------( 207      ( 30 Aug 2020 05:59 )             39.0                         12.9   7.28  )-----------( 210      ( 29 Aug 2020 06:26 )             38.8                         13.9   6.76  )-----------( 203      ( 28 Aug 2020 07:50 )             42.3     30 Aug 2020 05:59    140    |  105    |  22     ----------------------------<  88     3.7     |  32     |  1.20   29 Aug 2020 06:26    141    |  106    |  19     ----------------------------<  93     3.8     |  32     |  1.30   28 Aug 2020 07:50    141    |  107    |  21     ----------------------------<  80     4.1     |  31     |  1.10     Ca    9.1        30 Aug 2020 05:59  Ca    8.7        29 Aug 2020 06:26  Ca    9.1        28 Aug 2020 07:50    TPro  7.1    /  Alb  3.1    /  TBili  0.5    /  DBili  x      /  AST  15     /  ALT  15     /  AlkPhos  110    30 Aug 2020 05:59  TPro  7.0    /  Alb  3.0    /  TBili  0.5    /  DBili  x      /  AST  15     /  ALT  14     /  AlkPhos  106    29 Aug 2020 06:26  TPro  7.2    /  Alb  3.2    /  TBili  0.5    /  DBili  x      /  AST  13     /  ALT  16     /  AlkPhos  109    28 Aug 2020 07:50          Blood Culture: Organism --  Gram Stain Blood -- Gram Stain   No polymorphonuclear cells seen per low power field  No organisms seen per oil power field  Specimen Source .Tissue Other, distal right 2nd toe  Culture-Blood --    Organism --  Gram Stain Blood -- Gram Stain --  Specimen Source .Blood Blood  Culture-Blood --

## 2020-08-30 NOTE — PROGRESS NOTE ADULT - ASSESSMENT
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 Admit for r/o osteomyelitis, s/p partial amputation L 3rd toe and R 2nd toe, POD#3.

## 2020-08-31 ENCOUNTER — TRANSCRIPTION ENCOUNTER (OUTPATIENT)
Age: 55
End: 2020-08-31

## 2020-08-31 VITALS
RESPIRATION RATE: 17 BRPM | OXYGEN SATURATION: 97 % | SYSTOLIC BLOOD PRESSURE: 121 MMHG | HEART RATE: 68 BPM | DIASTOLIC BLOOD PRESSURE: 75 MMHG | TEMPERATURE: 99 F

## 2020-08-31 DIAGNOSIS — E10.649 TYPE 1 DIABETES MELLITUS WITH HYPOGLYCEMIA WITHOUT COMA: ICD-10-CM

## 2020-08-31 LAB
ALBUMIN SERPL ELPH-MCNC: 3.3 G/DL — SIGNIFICANT CHANGE UP (ref 3.3–5)
ALP SERPL-CCNC: 109 U/L — SIGNIFICANT CHANGE UP (ref 40–120)
ALT FLD-CCNC: 15 U/L — SIGNIFICANT CHANGE UP (ref 12–78)
ANION GAP SERPL CALC-SCNC: 5 MMOL/L — SIGNIFICANT CHANGE UP (ref 5–17)
AST SERPL-CCNC: 18 U/L — SIGNIFICANT CHANGE UP (ref 15–37)
BASOPHILS # BLD AUTO: 0.08 K/UL — SIGNIFICANT CHANGE UP (ref 0–0.2)
BASOPHILS NFR BLD AUTO: 1.1 % — SIGNIFICANT CHANGE UP (ref 0–2)
BILIRUB SERPL-MCNC: 0.4 MG/DL — SIGNIFICANT CHANGE UP (ref 0.2–1.2)
BUN SERPL-MCNC: 18 MG/DL — SIGNIFICANT CHANGE UP (ref 7–23)
CALCIUM SERPL-MCNC: 9 MG/DL — SIGNIFICANT CHANGE UP (ref 8.5–10.1)
CHLORIDE SERPL-SCNC: 107 MMOL/L — SIGNIFICANT CHANGE UP (ref 96–108)
CO2 SERPL-SCNC: 30 MMOL/L — SIGNIFICANT CHANGE UP (ref 22–31)
CREAT SERPL-MCNC: 1.1 MG/DL — SIGNIFICANT CHANGE UP (ref 0.5–1.3)
CULTURE RESULTS: SIGNIFICANT CHANGE UP
CULTURE RESULTS: SIGNIFICANT CHANGE UP
EOSINOPHIL # BLD AUTO: 0.36 K/UL — SIGNIFICANT CHANGE UP (ref 0–0.5)
EOSINOPHIL NFR BLD AUTO: 5 % — SIGNIFICANT CHANGE UP (ref 0–6)
GLUCOSE SERPL-MCNC: 43 MG/DL — CRITICAL LOW (ref 70–99)
HCT VFR BLD CALC: 40.8 % — SIGNIFICANT CHANGE UP (ref 39–50)
HGB BLD-MCNC: 13.4 G/DL — SIGNIFICANT CHANGE UP (ref 13–17)
IMM GRANULOCYTES NFR BLD AUTO: 0.3 % — SIGNIFICANT CHANGE UP (ref 0–1.5)
LYMPHOCYTES # BLD AUTO: 1.33 K/UL — SIGNIFICANT CHANGE UP (ref 1–3.3)
LYMPHOCYTES # BLD AUTO: 18.4 % — SIGNIFICANT CHANGE UP (ref 13–44)
MCHC RBC-ENTMCNC: 28.7 PG — SIGNIFICANT CHANGE UP (ref 27–34)
MCHC RBC-ENTMCNC: 32.8 GM/DL — SIGNIFICANT CHANGE UP (ref 32–36)
MCV RBC AUTO: 87.4 FL — SIGNIFICANT CHANGE UP (ref 80–100)
MONOCYTES # BLD AUTO: 0.63 K/UL — SIGNIFICANT CHANGE UP (ref 0–0.9)
MONOCYTES NFR BLD AUTO: 8.7 % — SIGNIFICANT CHANGE UP (ref 2–14)
NEUTROPHILS # BLD AUTO: 4.79 K/UL — SIGNIFICANT CHANGE UP (ref 1.8–7.4)
NEUTROPHILS NFR BLD AUTO: 66.5 % — SIGNIFICANT CHANGE UP (ref 43–77)
NRBC # BLD: 0 /100 WBCS — SIGNIFICANT CHANGE UP (ref 0–0)
PLATELET # BLD AUTO: 215 K/UL — SIGNIFICANT CHANGE UP (ref 150–400)
POTASSIUM SERPL-MCNC: 3.8 MMOL/L — SIGNIFICANT CHANGE UP (ref 3.5–5.3)
POTASSIUM SERPL-SCNC: 3.8 MMOL/L — SIGNIFICANT CHANGE UP (ref 3.5–5.3)
PROT SERPL-MCNC: 7.3 G/DL — SIGNIFICANT CHANGE UP (ref 6–8.3)
RBC # BLD: 4.67 M/UL — SIGNIFICANT CHANGE UP (ref 4.2–5.8)
RBC # FLD: 13.3 % — SIGNIFICANT CHANGE UP (ref 10.3–14.5)
SODIUM SERPL-SCNC: 142 MMOL/L — SIGNIFICANT CHANGE UP (ref 135–145)
SPECIMEN SOURCE: SIGNIFICANT CHANGE UP
SPECIMEN SOURCE: SIGNIFICANT CHANGE UP
SURGICAL PATHOLOGY STUDY: SIGNIFICANT CHANGE UP
WBC # BLD: 7.21 K/UL — SIGNIFICANT CHANGE UP (ref 3.8–10.5)
WBC # FLD AUTO: 7.21 K/UL — SIGNIFICANT CHANGE UP (ref 3.8–10.5)

## 2020-08-31 PROCEDURE — 99024 POSTOP FOLLOW-UP VISIT: CPT

## 2020-08-31 PROCEDURE — 86140 C-REACTIVE PROTEIN: CPT

## 2020-08-31 PROCEDURE — 99285 EMERGENCY DEPT VISIT HI MDM: CPT | Mod: 25

## 2020-08-31 PROCEDURE — 87070 CULTURE OTHR SPECIMN AEROBIC: CPT

## 2020-08-31 PROCEDURE — 85652 RBC SED RATE AUTOMATED: CPT

## 2020-08-31 PROCEDURE — 82962 GLUCOSE BLOOD TEST: CPT

## 2020-08-31 PROCEDURE — 93005 ELECTROCARDIOGRAM TRACING: CPT

## 2020-08-31 PROCEDURE — 88304 TISSUE EXAM BY PATHOLOGIST: CPT

## 2020-08-31 PROCEDURE — 87040 BLOOD CULTURE FOR BACTERIA: CPT

## 2020-08-31 PROCEDURE — 86769 SARS-COV-2 COVID-19 ANTIBODY: CPT

## 2020-08-31 PROCEDURE — 87075 CULTR BACTERIA EXCEPT BLOOD: CPT

## 2020-08-31 PROCEDURE — 71045 X-RAY EXAM CHEST 1 VIEW: CPT

## 2020-08-31 PROCEDURE — 99285 EMERGENCY DEPT VISIT HI MDM: CPT

## 2020-08-31 PROCEDURE — 86703 HIV-1/HIV-2 1 RESULT ANTBDY: CPT

## 2020-08-31 PROCEDURE — 85027 COMPLETE CBC AUTOMATED: CPT

## 2020-08-31 PROCEDURE — 88311 DECALCIFY TISSUE: CPT

## 2020-08-31 PROCEDURE — 83036 HEMOGLOBIN GLYCOSYLATED A1C: CPT

## 2020-08-31 PROCEDURE — G0463: CPT

## 2020-08-31 PROCEDURE — 87186 SC STD MICRODIL/AGAR DIL: CPT

## 2020-08-31 PROCEDURE — 96374 THER/PROPH/DIAG INJ IV PUSH: CPT

## 2020-08-31 PROCEDURE — 80053 COMPREHEN METABOLIC PANEL: CPT

## 2020-08-31 PROCEDURE — 80197 ASSAY OF TACROLIMUS: CPT

## 2020-08-31 PROCEDURE — 99232 SBSQ HOSP IP/OBS MODERATE 35: CPT

## 2020-08-31 PROCEDURE — U0003: CPT

## 2020-08-31 PROCEDURE — 73630 X-RAY EXAM OF FOOT: CPT

## 2020-08-31 PROCEDURE — 36415 COLL VENOUS BLD VENIPUNCTURE: CPT

## 2020-08-31 PROCEDURE — 84134 ASSAY OF PREALBUMIN: CPT

## 2020-08-31 PROCEDURE — 73723 MRI JOINT LWR EXTR W/O&W/DYE: CPT

## 2020-08-31 PROCEDURE — 80048 BASIC METABOLIC PNL TOTAL CA: CPT

## 2020-08-31 RX ORDER — LOSARTAN POTASSIUM 100 MG/1
1 TABLET, FILM COATED ORAL
Qty: 14 | Refills: 0
Start: 2020-08-31 | End: 2020-09-13

## 2020-08-31 RX ORDER — LOSARTAN POTASSIUM 100 MG/1
1 TABLET, FILM COATED ORAL
Qty: 0 | Refills: 0 | DISCHARGE

## 2020-08-31 RX ADMIN — Medication 75 MILLIGRAM(S): at 05:49

## 2020-08-31 RX ADMIN — GABAPENTIN 300 MILLIGRAM(S): 400 CAPSULE ORAL at 17:57

## 2020-08-31 RX ADMIN — HEPARIN SODIUM 5000 UNIT(S): 5000 INJECTION INTRAVENOUS; SUBCUTANEOUS at 14:42

## 2020-08-31 RX ADMIN — Medication 25 MILLIGRAM(S): at 05:49

## 2020-08-31 RX ADMIN — HEPARIN SODIUM 5000 UNIT(S): 5000 INJECTION INTRAVENOUS; SUBCUTANEOUS at 05:49

## 2020-08-31 RX ADMIN — FAMOTIDINE 20 MILLIGRAM(S): 10 INJECTION INTRAVENOUS at 12:19

## 2020-08-31 RX ADMIN — Medication 75 MILLIGRAM(S): at 17:56

## 2020-08-31 RX ADMIN — Medication 325 MILLIGRAM(S): at 12:19

## 2020-08-31 RX ADMIN — Medication 0.1 MILLIGRAM(S): at 05:49

## 2020-08-31 RX ADMIN — Medication 25 MILLIGRAM(S): at 14:42

## 2020-08-31 RX ADMIN — Medication 1 TABLET(S): at 12:19

## 2020-08-31 RX ADMIN — AZATHIOPRINE 100 MILLIGRAM(S): 100 TABLET ORAL at 12:19

## 2020-08-31 RX ADMIN — GABAPENTIN 300 MILLIGRAM(S): 400 CAPSULE ORAL at 05:49

## 2020-08-31 RX ADMIN — TACROLIMUS 1.5 MILLIGRAM(S): 5 CAPSULE ORAL at 09:27

## 2020-08-31 RX ADMIN — Medication 0.1 MILLIGRAM(S): at 17:56

## 2020-08-31 RX ADMIN — LOSARTAN POTASSIUM 50 MILLIGRAM(S): 100 TABLET, FILM COATED ORAL at 05:49

## 2020-08-31 NOTE — PROGRESS NOTE ADULT - PROBLEM SELECTOR PLAN 6
chronic, on lovastatin at home, continue  -DASH/TLC diet

## 2020-08-31 NOTE — PROGRESS NOTE ADULT - ASSESSMENT
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    1.	CKD 3, h/o Kidney transplant 2013, Living related donor (Baseline 1.2)  2.	Diabetes  3.	Hypertension  4.	Diabetic foot ulcers, s/p Partial amputation of left third toe & right second toe    Stable renal indices. Pt on prograf & azathioprine. Monitor blood sugar levels. Insulin coverage as needed. On IV abx.   Dietary restriction. Monitor BP trend. Titrate BP meds as needed. Salt restriction. Avoid nephrotoxic meds as possible.   Podiatry follow up. Will follow electrolytes and renal function trend.

## 2020-08-31 NOTE — PROGRESS NOTE ADULT - PROBLEM SELECTOR PROBLEM 4
Renal transplant recipient

## 2020-08-31 NOTE — PROGRESS NOTE ADULT - SUBJECTIVE AND OBJECTIVE BOX
[facility] -- Julisa Jansen Grossman, Wachsman, Karan Kelly Savella, Goodger: Office # [phone]    Follow Up:  HTN, Cardiac optimization pre/post op     Subjective/Observations: Patient seen and examined. Patient awake and alert, resting comfortably in bed. No complaints of chest pain, SOB, LE edema, cough. No signs of orthopnea or PND.    REVIEW OF SYSTEMS: All review of systems is negative for eye, ENT, GI, , allergic, dermatologic, musculoskeletal and neurologic except as described above    PAST MEDICAL & SURGICAL HISTORY:  History of DVT (deep vein thrombosis)  Squamous cell carcinoma of skin of left ear  CVA (cerebral vascular accident)  Obesity (BMI 30-39.9)  Diabetic peripheral neuropathy  CKD (chronic kidney disease)  Diabetes mellitus  Hyperlipidemia  Hypertension  S/P kidney transplant  End-stage renal failure with renal transplant: 12/2013  H/O foot surgery: 2005 - right foot - bone fracture - s/p ORIF and subsequent removal of hardware  Vasectomy status: s/p b/l  vasectomy  Ear disease: Left inner ear surgery  Toe infection: 2007 L 4th Toe surgery-amputation secondary to osteomyelitis    MEDICATIONS  (STANDING):  aspirin 325 milliGRAM(s) Oral daily  atorvastatin 10 milliGRAM(s) Oral at bedtime  azaTHIOprine 100 milliGRAM(s) Oral daily  cefTRIAXone   IVPB 2000 milliGRAM(s) IV Intermittent every 24 hours  cloNIDine 0.1 milliGRAM(s) Oral every 12 hours  dextrose 5%. 1000 milliLiter(s) (50 mL/Hr) IV Continuous <Continuous>  dextrose 5%. 1000 milliLiter(s) (50 mL/Hr) IV Continuous <Continuous>  dextrose 50% Injectable 12.5 Gram(s) IV Push once  dextrose 50% Injectable 25 Gram(s) IV Push once  dextrose 50% Injectable 25 Gram(s) IV Push once  dextrose 50% Injectable 12.5 Gram(s) IV Push once  dextrose 50% Injectable 25 Gram(s) IV Push once  dextrose 50% Injectable 25 Gram(s) IV Push once  famotidine    Tablet 20 milliGRAM(s) Oral daily  gabapentin 300 milliGRAM(s) Oral two times a day  heparin   Injectable 5000 Unit(s) SubCutaneous every 8 hours  hydrALAZINE 25 milliGRAM(s) Oral every 8 hours  insulin lispro (HumaLOG) Pump 1 Each SubCutaneous Continuous Pump  losartan 50 milliGRAM(s) Oral daily  metoprolol tartrate 75 milliGRAM(s) Oral two times a day  tacrolimus 1.5 milliGRAM(s) Oral two times a day  trimethoprim   80 mG/sulfamethoxazole 400 mG 1 Tablet(s) Oral daily    MEDICATIONS  (PRN):  dextrose 40% Gel 15 Gram(s) Oral once PRN Blood Glucose LESS THAN 70 milliGRAM(s)/deciliter  dextrose 40% Gel 15 Gram(s) Oral once PRN Blood Glucose LESS THAN 70 milliGRAM(s)/deciliter  glucagon  Injectable 1 milliGRAM(s) IntraMuscular once PRN Glucose LESS THAN 70 milligrams/deciliter  glucagon  Injectable 1 milliGRAM(s) IntraMuscular once PRN Glucose LESS THAN 70 milligrams/deciliter    Allergies  No Known Allergies    Vital Signs Last 24 Hrs  T(C): 36.6 (31 Aug 2020 05:05), Max: 37.1 (30 Aug 2020 16:50)  T(F): 97.8 (31 Aug 2020 05:05), Max: 98.8 (30 Aug 2020 16:50)  HR: 62 (31 Aug 2020 05:05) (60 - 71)  BP: 114/69 (31 Aug 2020 05:05) (114/69 - 144/79)  BP(mean): --  RR: 18 (31 Aug 2020 05:05) (18 - 18)  SpO2: 94% (31 Aug 2020 05:05) (94% - 95%)    I&O's Summary    30 Aug 2020 07:01  -  31 Aug 2020 07:00  --------------------------------------------------------  IN: 600 mL / OUT: 0 mL / NET: 600 mL    TELE: Not on telemetry   PHYSICAL EXAM:  Appearance: NAD, no distress, alert, Well developed   HEENT: Moist Mucous Membranes, Anicteric  Cardiovascular: Regular rate and rhythm, Normal S1 S2, No JVD, No murmurs, No rubs, gallops or clicks  Respiratory: Non-labored, Clear to auscultation, No rales, No rhonchi, No wheezing.   Gastrointestinal:  Soft, Non-tender, + BS  Neurologic: Non-focal  Skin: Warm and dry, + B/L foot DSD No visible rashes or ulcers, No ecchymosis, No cyanosis  Musculoskeletal: No clubbing, No cyanosis, No joint swelling/tenderness  Psychiatry: Mood & affect appropriate  Lymph: No peripheral edema.     LABS: All Labs Reviewed:                        12.9   8.34  )-----------( 207      ( 30 Aug 2020 05:59 )             39.0                         12.9   7.28  )-----------( 210      ( 29 Aug 2020 06:26 )             38.8     30 Aug 2020 05:59    140    |  105    |  22     ----------------------------<  88     3.7     |  32     |  1.20   29 Aug 2020 06:26    141    |  106    |  19     ----------------------------<  93     3.8     |  32     |  1.30     Ca    9.1        30 Aug 2020 05:59  Ca    8.7        29 Aug 2020 06:26    TPro  7.1    /  Alb  3.1    /  TBili  0.5    /  DBili  x      /  AST  15     /  ALT  15     /  AlkPhos  110    30 Aug 2020 05:59  TPro  7.0    /  Alb  3.0    /  TBili  0.5    /  DBili  x      /  AST  15     /  ALT  14     /  AlkPhos  106    29 Aug 2020 06:26    12 Lead ECG:   Ventricular Rate 61 BPM  Atrial Rate 61 BPM  P-R Interval 256 ms  QRS Duration 98 ms  Q-T Interval 394 ms  QTC Calculation(Bezet) 396 ms  P Axis 40 degrees  R Axis 2 degrees  T Axis 10 degrees  Diagnosis Line Sinus rhythm with 1stdegree AV block  Otherwise normal ECG  When compared with ECG of 16-DEC-2019 16:49,  No significant change was found  Confirmed by Marko Mauricio MD ([id number]) on 8/26/2020 12:17:37 PM (08-26-20 @ 10:21)    < from: TTE with Doppler (w/Cont) (07.27.17 @ 09:29) >  Conclusions:  1. Increased relative wall thickness with normal left ventricular mass index, consistent with concentric left ventricular remodeling.  2. Normal left ventricular systolic function. No segmental wall motion abnormalities. Endocardial visualization enhanced with intravenous injection of echo contrast (Definity).  < end of copied text >    < from: Cardiac Cath Lab (11.20.13 @ 09:46) >  VENTRICLES: No left ventriculogram was performed.  CORONARY VESSELS: The coronary circulation is co-dominant.  LM:   --  LM: Normal.  LAD:   --  Mid LAD: Angiography showed minor luminal irregularities with no  flow limiting lesions.  CX:   --  Circumflex: Normal.  RCA:   --  RCA: Normal.  COMPLICATIONS: There were no complications.  DIAGNOSTIC RECOMMENDATIONS: Patient management should include close monitoring of BUN and creatinine. Medical management is recommended. At this time, patient is stable from a cardiovascular standpoint for renal transplant.  < end of copied text >    < from: Xray Chest 1 View AP/PA (08.26.20 @ 10:03) >  FINDINGS:  Lungs: The lungs are clear.Mild elevation of the right hemidiaphragm.  Heart: The heart is normal in size. Loop recorder overlies the heart.  Mediastinum: The mediastinum is within normal limits.    IMPRESSION:  Mild elevation of the right hemidiaphragm. No focal consolidation.  < end of copied text >

## 2020-08-31 NOTE — PROGRESS NOTE ADULT - PROBLEM SELECTOR PLAN 1
no evidence of residual osteo but would carefully follow with wound care per podiatry-no further abx at this point.

## 2020-08-31 NOTE — PROGRESS NOTE ADULT - PROVIDER SPECIALTY LIST ADULT
Cardiology
Diabetes
Endocrinology
Infectious Disease
Internal Medicine
Nephrology
Nephrology
Podiatry
Nephrology
Internal Medicine

## 2020-08-31 NOTE — DISCHARGE NOTE NURSING/CASE MANAGEMENT/SOCIAL WORK - PATIENT PORTAL LINK FT
You can access the FollowMyHealth Patient Portal offered by [facility] by registering at the following website: [email or web address]. By joining [facility]’s FollowMyHealth portal, you will also be able to view your health information using other applications (apps) compatible with our system.

## 2020-08-31 NOTE — PROGRESS NOTE ADULT - PROBLEM SELECTOR PLAN 1
pt aware that with bone being exposed if there is not already osteo it will develop. Pt understands role of surgery with nonhealing situation and this is reasonable and warranted in this context. Pending surgery and further micro data recommend:  ceftriaxone 2 grams IV Q-day pending micro and path data  -if path shows no residual osteo then fine to dc off abx

## 2020-08-31 NOTE — PROGRESS NOTE ADULT - PROBLEM SELECTOR PROBLEM 2
CVA (cerebral vascular accident)
CVA (cerebral vascular accident)
Diabetic peripheral neuropathy
CVA (cerebral vascular accident)

## 2020-08-31 NOTE — DISCHARGE NOTE NURSING/CASE MANAGEMENT/SOCIAL WORK - NSDCPEPTSTRK_GEN_ALL_CORE
Stroke warning signs and symptoms/Signs and symptoms of stroke/Call 911 for stroke/Risk factors for stroke/Stroke support groups for patients, families, and friends/Prescribed medications/Stroke education booklet/Need for follow up after discharge

## 2020-08-31 NOTE — PROGRESS NOTE ADULT - ASSESSMENT
Patient is awake and alert,  developed hypoglycemia due to  temporary basal  and he was receiving his full dose of insulin.  He stated he  knew  his  glucose  was low due to  dizziness.  Reviewed  insulin  pump setting infusing at  2.1 units/hr funtil  M  Patient awaiting results of  bx before  discharge

## 2020-08-31 NOTE — PROGRESS NOTE ADULT - PROBLEM SELECTOR PLAN 1
Dr Perlman aware  basal rate adjusted to 70% or 2.1 units/hour  educated on hypoglycemia  educated on need for follow up education on insulin pump and CGM  monitor glucose trends  Goal range 140 to 180-mg/dl  follow prn

## 2020-08-31 NOTE — PROGRESS NOTE ADULT - SUBJECTIVE AND OBJECTIVE BOX
Surgical Pathology Report (08.27.20 @ 13:30)    Surgical Pathology Report:   ACCESSION No:  30 [id number]    LUTHER NORIEGA                  3        Surgical Final Report          Final Diagnosis    1. Distal right second toe, amputation:  -Focal ulceration with granulation tissue formation and  necrotizing  inflammatory exudate.  -Underlying bone with focal active bone remodeling and new  bone formation  with marrow fibrosis, consistent with reactive changes,  negative for  osteomyelitis.  -Intact skin demonstrating secondary epidermal changes  including pseudo-  epitheliomatous hyperplasia, hyperkeratosis and  parakeratosis.  -Viable skin and soft tissue resection margin with  secondary epidermal changes,  negative for significant inflammatory activity.  -Viable specimen bone resection margin, negative for  osteomyelitis.    2. Bone, distal right second middle phalanx, excision:  -Articular bone and adjacent soft tissue with reactive and  degenerative changes.  -No evidence of osteomyelitis.    3. Distal left third toe, amputation:  -Ulceration with granulation tissue formation and necrosis,  and necrotizing  inflammatory exudate.  -Underlying bone with acute osteomyelitis and inflammatory  exudate,  accompanied by active bone remodeling with new bone  formation and marrow  granulation tissue, extending to inked margin.  -Intact skin demonstrating extensive granulation tissue  formation and focal  necrosis, accompanied by secondary epidermal changes  including pseudo-  epitheliomatous hyperplasia, hyperkeratosis and  parakeratosis.  -Secondary epidermal changes and granulation tissue extend  to inked specimen  skin and soft tissue margin.  -Detached portion of articular bone and adjacent soft  tissue demonstrating mild  acute osteomyelitis accompanied by active bone remodeling  with new bone  formation and marrow granulation tissue.    4. Bone, distal left third middle phalanx, excision:  -Articular bone and adjacent soft tissue with reactive and  degenerative changes.  -No evidence of osteomyelitis.    5. Bone, third toe left foot, head of proximal phalanx, excision:  -Articular bone and adjacent soft tissue with reactive and  degenerative changes.  -No evidence of osteomyelitis.    Verified by: TRACEY DEL ROSARIO  (Electronic Signature)  Reported on: 08/31/20 11:35 EDT, [facility], [address] [place] [address], [place] [address]  Phone: [phone]   Fax: [phone]  _________________________________________________________________    Clinical History  Left 3rd digit and right 2nd digit infection with osteomyelitis  Procedure: Partial amputation of left 3rd digit and right 2nd  digit    Specimen(s) Submitted  1- Distal right 2nd toe  2- Distal right 2nd middle phalanx  3- Distal left 3rd toe  4- Distal left 3rd toe  5- Head of proximal phalynx 3rd toe left foot    Gross Description  1. The specimen is received in formalin and the specimen  container is labeled: Distal right 2nd toe. It consists of an  amputated distal portion ofdigit with smooth skin and bone  margins of resection measuring 2.0 x 1.7 x 1.3 cm. The nail shows  tan-yellow discoloration. The distal tip shows a 1.5 x 1.0 cm  tan-yellow ulcerated lesion, abutting the skin margin. The bone  cut surface is yellow and spongy. The specimen is entirely  submitted following selective decalcification. Five cassettes.  A - bone margin  B-E - full cross sections of digit    2. The specimen is received in formalin and the specimen  container is labeled: Distal right 2nd middle phalanx. It  consists of a piece of tan-yellow bony tissue measuring 1.0 x 0.5  x 0.4 cm. The bone cut surface is yellow and spongy. Entirely  submitted following decalcification. One cassette.            LUTHER NORIEGA 3        Surgical Final Report            3. The specimen is received in formalin and the specimen  container is labeled: Distal left 3rd toe. It consists of an  amputated distal portion of digit with a smooth skin margin of  resection measuring 2.0x 1.5 x 1.5 cm. The nail shows tan-yellow  discoloration. The distal tip shows a 1.5 x 1.0 cm tan-yellow  ulcerated lesion, abutting the skin margin. Separatey received is  a piece of tan-yellow bony tissue measuring 1.5 x 1.2 x 0.5 cm,  probably representing bone margin.   The specimen is entirely  submitted following selective decalcification. Five cassettes.  A - separately received bone  B-E - full cross sections of digit    4. The specimen is received in formalin and the specimen  container is labeled: Distal left 3rd middle phalanx. It consists  of two irregular pieces of tan-brown hemorrhagic bony tissue  measuring in aggregate 2.2 x 1.5 x 0.4 cm. The bone is cut and  decalcified prior to submission. Entirely submitted following  decalcification. Two cassettes (one piece of bone per cassette).    5. The specimen is received in formalin and the specimen  container is labeled: Head of proximal phalanx 3rd toe left foot.  It consists of a piece of tan-yellow bony tissue measuring 1.0 x  0.7 x 0.4 cm. The bone cut surface is yellow and spongy. Entirely  submitted following decalcification. One cassette.    In addition to other data that may appear on the specimen  containers, all labels have been inspected to confirm the  presence of the patient's name and date of birth.  WILL Nicole (ASCP) 8/30/2020 11:37 AM    Perioperative Diagnosis  Left 3rd digit and right 2nd digit infection with osteomyelitis    Postoperative Diagnosis  Same

## 2020-08-31 NOTE — PROGRESS NOTE ADULT - ASSESSMENT
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  -No evidence of osteomyelitis.      Partial amputation of right second toe 27-Aug-2020 14:39:37  Becki Campbell.  Distal right second toe, amputation:  -Viable specimen bone resection margin, negative for  osteomyelitis.

## 2020-08-31 NOTE — PROGRESS NOTE ADULT - ASSESSMENT
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 Admit for r/o osteomyelitis, s/p partial amputation L 3rd toe and R 2nd toe, POD#3. 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 Admit for r/o osteomyelitis, s/p partial amputation L 3rd toe and R 2nd toe, POD#4.

## 2020-08-31 NOTE — PROGRESS NOTE ADULT - PROBLEM SELECTOR PLAN 3
- H/O Renal Transplant at [facility]- LDRT  --Cr stable   -nephro Dr. Bryant following the case

## 2020-08-31 NOTE — PROGRESS NOTE ADULT - SUBJECTIVE AND OBJECTIVE BOX
CAPILLARY BLOOD GLUCOSE      POCT Blood Glucose.: 56 mg/dL (31 Aug 2020 07:51)  POCT Blood Glucose.: 59 mg/dL (31 Aug 2020 07:48)  POCT Blood Glucose.: 187 mg/dL (30 Aug 2020 21:14)  POCT Blood Glucose.: 151 mg/dL (30 Aug 2020 16:30)  POCT Blood Glucose.: 140 mg/dL (30 Aug 2020 12:04)      Vital Signs Last 24 Hrs  T(C): 36.6 (31 Aug 2020 05:05), Max: 37.1 (30 Aug 2020 16:50)  T(F): 97.8 (31 Aug 2020 05:05), Max: 98.8 (30 Aug 2020 16:50)  HR: 62 (31 Aug 2020 05:05) (60 - 71)  BP: 114/69 (31 Aug 2020 05:05) (114/69 - 144/79)  BP(mean): --  RR: 18 (31 Aug 2020 05:05) (18 - 18)  SpO2: 94% (31 Aug 2020 05:05) (94% - 95%)    General: WN/WD NAD  Respiratory: CTA B/L  CV: RRR, S1S2, no murmurs, rubs or gallops  Abdominal: Soft, NT, ND +BS, Last BM  Extremities: le foot dsg intact     08-30    140  |  105  |  22  ----------------------------<  88  3.7   |  32<H>  |  1.20    Ca    9.1      30 Aug 2020 05:59    TPro  7.1  /  Alb  3.1<L>  /  TBili  0.5  /  DBili  x   /  AST  15  /  ALT  15  /  AlkPhos  110  08-30      atorvastatin 10 milliGRAM(s) Oral at bedtime  dextrose 40% Gel 15 Gram(s) Oral once PRN  dextrose 40% Gel 15 Gram(s) Oral once PRN  dextrose 50% Injectable 12.5 Gram(s) IV Push once  dextrose 50% Injectable 25 Gram(s) IV Push once  dextrose 50% Injectable 25 Gram(s) IV Push once  dextrose 50% Injectable 12.5 Gram(s) IV Push once  dextrose 50% Injectable 25 Gram(s) IV Push once  dextrose 50% Injectable 25 Gram(s) IV Push once  glucagon  Injectable 1 milliGRAM(s) IntraMuscular once PRN  glucagon  Injectable 1 milliGRAM(s) IntraMuscular once PRN  insulin lispro (HumaLOG) Pump 1 Each SubCutaneous Continuous Pump

## 2020-08-31 NOTE — PROGRESS NOTE ADULT - PROBLEM SELECTOR PLAN 1
sent in by Dr. Araiza wound care to r/o osteomyelitis, ESR mildly elevated to 25  -pt states he is unable to get MRI due to metals 2/2 inner ear surgery   -partial amputation of R 2nd toe and L 3rd toe POD#3, f/u path results  -admits to incentive spirometry use, encourage ambulation  -PT consulted; recs appreciated - no skilled PT needs at this time  -ID Dr. Del Valle consulted; recs appreciated  -Rocephin 2 gm IV Daily continue sent in by Dr. Araiza wound care to r/o osteomyelitis, ESR mildly elevated to 25  -pt states he is unable to get MRI due to metals 2/2 inner ear surgery   -partial amputation of R 2nd toe and L 3rd toe POD#4, f/u path results  -c/w incentive spirometry use, encourage ambulation  -PT consulted; recs appreciated - no skilled PT needs at this time  -ID Dr. Del Valle consulted; c/w Rocephin 2 gm IV Daily for a total of 7 days, last dose tomorrow  -f/u surgical bone path to r/o OM sent in by Dr. Araiza wound care to r/o osteomyelitis, ESR mildly elevated to 25  -pt states he is unable to get MRI due to metals 2/2 inner ear surgery   -partial amputation of R 2nd toe and L 3rd toe POD#4, f/u path results  -c/w incentive spirometry use, encourage ambulation  -PT consulted; recs appreciated - no skilled PT needs at this time  -ID Dr. Del Valle consulted; c/w Rocephin 2 gm IV Daily for a total of 7 days, last dose tomorrow  -surgical bone path- margins negative for OM  - ID-recs discontinue abx sent in by Dr. Araiza wound care to r/o osteomyelitis, ESR mildly elevated to 25  -pt states he is unable to get MRI due to metals 2/2 inner ear surgery   -partial amputation of R 2nd toe and L 3rd toe POD#4, f/u path results  -c/w incentive spirometry use, encourage ambulation  -PT consulted; recs appreciated - no skilled PT needs at this time  -ID Dr. Del Valle consulted; c/w Rocephin 2 gm IV Daily for a total of 7 days, last dose tomorrow  -surgical bone path- margins negative for OM  - ID Dr Saucedo -recs discontinue abx

## 2020-08-31 NOTE — PROGRESS NOTE ADULT - ASSESSMENT
54y old  Male who presents with a chief complaint of r/o osteomyelitis, scheduled for L third digit amputation and partial right 2nd digit amputation.     Non-healing toe ulcers  - With concern for OM.  ID following  - s/p toe amputation left 3rd digit and right partial 2nd digit   - Tolerated procedure well with no obvious cardiac complications  - Pain control  - Follow ID./ortho recs    History of CVA   - Continue ASA and statin    HTN   - BP: BP: 114/69 (08-31-20 @ 05:05) (114/69 - 144/79)  - BP stable.    - Continue Losartan at increased dose. Can up titrate if needed  - Continue home clonidine .1 BID, hydralazine 25 q8, lopressor 75 BID    - Monitor and replete lytes, keep K>4, Mg>2.  - All other medical needs as per primary team.  - Other cardiovascular workup will depend on clinical course.  - Will continue to follow.    Jessica Wu MS FNP, AGAP  Nurse Practitioner- Cardiology   Spectra #[phone]

## 2020-08-31 NOTE — PROGRESS NOTE ADULT - ATTENDING COMMENTS
I personally saw and examined the patient in detail.  I have spoken to the above provider regarding the assessment and plan of care.  I reviewed the above assessment and plan of care, and agree.  I have made changes in the body of the note where appropriate.
I personally saw and examined the patient in detail.  I have spoken to the above provider regarding the assessment and plan of care.  I reviewed the above assessment and plan of care, and agree.  I have made changes in the body of the note where appropriate.
Pt seen, examined, case & care plan d/w pt, residents at detail.  AM labs   Awaiting Pathology results.  D/C Plan.
Pt seen, examined, case & care plan d/w pt, residents at detail.  AM labs   Awaiting Bone pathology results from OR.  Ambulation with post op surgical shoe
Pt seen, examined, case & care plan d/w pt, residents at detail.  D/C home today.   Total d/c care time is 40 minutes.  D/W pt at detail.  D/W Dr Saucedo- No Abx  & Podiatry resident at detail.
Pt seen, Examined, case & care plan d/w pt, residents at detail.  AM labs   PT Eval.WBAT as per podiatry.
pt seen, examined case & care plan d/w pt, residents at detail.  AM labs.  PO diet post op

## 2020-08-31 NOTE — PROGRESS NOTE ADULT - PROBLEM SELECTOR PROBLEM 1
Type 1 diabetes mellitus with stage 5 chronic kidney disease not on chronic dialysis
Diabetic ulcer of toe
Diabetic ulcer of toe
Osteomyelitis
Uncontrolled type 1 diabetes mellitus with hypoglycemia without coma
Diabetic ulcer of toe

## 2020-08-31 NOTE — PROGRESS NOTE ADULT - ASSESSMENT
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    8/27-Partial amputation of left third toe 27-Aug-2020 14:39:54  Becki Campbell  Partial amputation of right second toe 27-Aug-2020 14:39:37  Becki Campbell.

## 2020-08-31 NOTE — PROGRESS NOTE ADULT - PROBLEM SELECTOR PLAN 5
chronic, on insulin pump- FS q AC HS  -Pat Weil diabetes educator to see patient  -Endo Dr. Perlman consulted, recs appreciated; pump settings adjusted for temp basal 70% for next 20 hrs, goal bg 100-180 in hosp setting.  -A1C 8.2,  was 7.1 in 12/2019  -Nutritional Eval., D/W Pat weil.  -continue to monitor chronic, on insulin pump- FS q AC HS  -Pat Weil diabetes educator to see patient  -Endo Dr. Perlman consulted, recs appreciated; pump settings adjusted for temp basal 70%, goal bg 100-180 in hosp setting.  -A1C 8.2,  was 7.1 in 12/2019  -Nutritional Eval., D/W Pat weil.  -continue to monitor chronic, on insulin pump- FS q AC HS  -Pat Weil diabetes educator to see patient  -Endo Dr. Perlman consulted, recs appreciated; pump settings adjusted for temp basal 60%, goal bg 100-180 in hosp setting.  -A1C 8.2,  was 7.1 in 12/2019  -Nutritional Eval., D/W Pat weil.  -continue to monitor

## 2020-08-31 NOTE — PROGRESS NOTE ADULT - SUBJECTIVE AND OBJECTIVE BOX
Patient is a 54y old  Male who presents with a chief complaint of r/o osteomyelitis (30 Aug 2020 18:10)    INCOMPLETE NOTE  INTERVAL HPI:    OVERNIGHT EVENTS:    Home Medications:  aspirin 325 mg oral tablet: 1 tab(s) orally once a day (26 Aug 2020 12:06)  Cozaar 25 mg oral tablet: 1 tab(s) orally once a day (26 Aug 2020 12:06)  famotidine 20 mg oral tablet: 1 tab(s) orally once a day (26 Aug 2020 12:06)  gabapentin 300 mg oral capsule: 1 cap(s) orally 2 times a day (26 Aug 2020 12:06)  lovastatin 40 mg oral tablet: 1 tab(s) orally once a day (26 Aug 2020 12:06)  Metoprolol Tartrate 25 mg oral tablet: 3 tab(s) orally 2 times a day    *conf. with patient and pharmacy. 75mg dose, BID* (26 Aug 2020 12:06)  tacrolimus 0.5 mg oral capsule: 3 cap(s) orally 2 times a day (26 Aug 2020 12:06)      MEDICATIONS  (STANDING):  aspirin 325 milliGRAM(s) Oral daily  atorvastatin 10 milliGRAM(s) Oral at bedtime  azaTHIOprine 100 milliGRAM(s) Oral daily  cefTRIAXone   IVPB 2000 milliGRAM(s) IV Intermittent every 24 hours  cloNIDine 0.1 milliGRAM(s) Oral every 12 hours  dextrose 5%. 1000 milliLiter(s) (50 mL/Hr) IV Continuous <Continuous>  dextrose 5%. 1000 milliLiter(s) (50 mL/Hr) IV Continuous <Continuous>  dextrose 50% Injectable 12.5 Gram(s) IV Push once  dextrose 50% Injectable 25 Gram(s) IV Push once  dextrose 50% Injectable 25 Gram(s) IV Push once  dextrose 50% Injectable 12.5 Gram(s) IV Push once  dextrose 50% Injectable 25 Gram(s) IV Push once  dextrose 50% Injectable 25 Gram(s) IV Push once  famotidine    Tablet 20 milliGRAM(s) Oral daily  gabapentin 300 milliGRAM(s) Oral two times a day  heparin   Injectable 5000 Unit(s) SubCutaneous every 8 hours  hydrALAZINE 25 milliGRAM(s) Oral every 8 hours  insulin lispro (HumaLOG) Pump 1 Each SubCutaneous Continuous Pump  losartan 50 milliGRAM(s) Oral daily  metoprolol tartrate 75 milliGRAM(s) Oral two times a day  tacrolimus 1.5 milliGRAM(s) Oral two times a day  trimethoprim   80 mG/sulfamethoxazole 400 mG 1 Tablet(s) Oral daily    MEDICATIONS  (PRN):  dextrose 40% Gel 15 Gram(s) Oral once PRN Blood Glucose LESS THAN 70 milliGRAM(s)/deciliter  dextrose 40% Gel 15 Gram(s) Oral once PRN Blood Glucose LESS THAN 70 milliGRAM(s)/deciliter  glucagon  Injectable 1 milliGRAM(s) IntraMuscular once PRN Glucose LESS THAN 70 milligrams/deciliter  glucagon  Injectable 1 milliGRAM(s) IntraMuscular once PRN Glucose LESS THAN 70 milligrams/deciliter      No Known Allergies      Social History:  Never a smoker, drinks ETOH once every few weeks, denies any illicit drugs  independent in ADLs, walks without assistance, lives at home with wife (26 Aug 2020 12:52)      REVIEW OF SYSTEMS:  CONSTITUTIONAL: No fever, No chills, No fatigue, No myalgia, No Body ache, No Weakness  EYES: No eye pain,  No visual disturbances, No discharge, No Redness  ENMT: No ear pain, No nose bleed, No vertigo; No sinus pain, No throat pain, No Congestion  NECK: No pain, No stiffness  RESPIRATORY: No cough, No wheezing, No hemoptysis, No shortness of breath  CARDIOVASCULAR: No chest pain, No palpitations  GASTROINTESTINAL: No abdominal pain, No epigastric pain. No nausea, No vomiting, No diarrhea, No constipation; [  ] BM  GENITOURINARY: No dysuria, No frequency, No urgency, No hematuria, No incontinence  NEUROLOGICAL: No headaches, No dizziness, No numbness, No tingling, No tremors, No weakness  EXTREMITIES: No Swelling, No Pain, No Edema  SKIN: [  ] No itching, burning, rashes, or lesions   MUSCULOSKELETAL: No joint pain, No joint swelling; No muscle pain, No back pain, No extremity pain  PSYCHIATRIC: No depression, No anxiety, No mood swings, No difficulty sleeping at night  PAIN SCALE: [  ] None  [  ] Other-  ROS Unable to obtain due to: [  ] Dementia  [  ] Lethargy  [  ] Sedated  [  ] Non verbal  REST OF REVIEW OF SYSTEMS: [  ] Normal     Vital Signs Last 24 Hrs  T(C): 36.6 (31 Aug 2020 05:05), Max: 37.1 (30 Aug 2020 16:50)  T(F): 97.8 (31 Aug 2020 05:05), Max: 98.8 (30 Aug 2020 16:50)  HR: 62 (31 Aug 2020 05:05) (60 - 71)  BP: 114/69 (31 Aug 2020 05:05) (114/69 - 144/79)  BP(mean): --  RR: 18 (31 Aug 2020 05:05) (18 - 18)  SpO2: 94% (31 Aug 2020 05:05) (94% - 95%)    CAPILLARY BLOOD GLUCOSE      POCT Blood Glucose.: 187 mg/dL (30 Aug 2020 21:14)  POCT Blood Glucose.: 151 mg/dL (30 Aug 2020 16:30)  POCT Blood Glucose.: 140 mg/dL (30 Aug 2020 12:04)      I&O's Summary    30 Aug 2020 07:01  -  31 Aug 2020 07:00  --------------------------------------------------------  IN: 600 mL / OUT: 0 mL / NET: 600 mL      PHYSICAL EXAM:  GENERAL:  [  ] NAD, [  ] Well appearing, [  ] Agitated, [  ] Drowsy, [  ] Lethargy, [  ] Confused   HEAD:  [  ] Normal, [  ] Other  EYES:  [  ] EOMI, [  ] PERRLA, [  ] Conjunctiva and sclera clear normal, [  ] Other, [  ] Pallor, [  ] Discharge  ENMT:  [  ] Normal, [  ] Moist mucous membranes, [  ] Good dentition, [  ] No thrush  NECK:  [  ] Supple, [  ] No JVD, [  ] Normal thyroid, [  ] Lymphadenopathy, [  ] Other  CHEST/LUNG:  [  ] Clear to auscultation bilaterally, [  ] Breath Sounds equal B/L / decreased, [  ] Poor effort, [  ] No rales, [  ] No rhonchi, [  ] No wheezing  HEART:  [  ] Regular rate and rhythm, [  ] Tachycardia, [  ] Bradycardia, [  ] Irregular, [  ] No murmurs, No rubs, No gallops, [  ] PPM in place (Mfr:  )  ABDOMEN:  [  ] Soft, [  ] Nontender, [  ] Nondistended, [  ] No mass, [  ] Bowel sounds present, [  ] Obese  NERVOUS SYSTEM:  [  ] Alert & Oriented x3, [  ] Nonfocal, [  ] Confusion, [  ] Encephalopathic, [  ] Sedated, [  ] Unable to assess, [  ] Dementia, [  ] Other-  EXTREMITIES:  [  ] 2+ Peripheral Pulses, No clubbing, No cyanosis,  [  ] Edema B/L lower EXT, [  ] PVD stasis skin changes B/L lower EXT, [  ] Wound  LYMPH:  No lymphadenopathy noted  SKIN:  [  ] No rashes or lesions, [  ] Pressure ulcers, [  ] Ecchymosis, [  ] Skin tears, [  ] Other    DIET: Diet, Regular:   Consistent Carbohydrate No Snacks  DASH/TLC Sodium & Cholesterol Restricted  No Concentrated Potassium  No Concentrated Phosphorus (08-27-20 @ 14:36)      LABS:      Ca    9.1        30 Aug 2020 05:59          Culture Results:   No growth (08-27 @ 22:01)  Culture Results:   Rare Coag Negative Staphylococcus (08-27 @ 22:01)  Culture Results:   No growth to date. (08-26 @ 16:46)  Culture Results:   No growth to date. (08-26 @ 16:46)            Culture - Tissue with Gram Stain (collected 27 Aug 2020 22:01)  Source: .Tissue Other, distal left 3rd toe  Gram Stain (27 Aug 2020 23:26):    No polymorphonuclear cells seen per low power field    No organisms seen per oil power field  Preliminary Report (29 Aug 2020 17:01):    Rare Coag Negative Staphylococcus  Organism: Coag Negative Staphylococcus (30 Aug 2020 19:55)  Organism: Coag Negative Staphylococcus (30 Aug 2020 19:55)    Culture - Tissue with Gram Stain (collected 27 Aug 2020 22:01)  Source: .Tissue Other, distal right 2nd toe  Gram Stain (27 Aug 2020 23:26):    No polymorphonuclear cells seen per low power field    No organisms seen per oil power field  Preliminary Report (28 Aug 2020 15:56):    No growth    Culture - Blood (collected 26 Aug 2020 16:46)  Source: .Blood Blood  Preliminary Report (27 Aug 2020 17:01):    No growth to date.    Culture - Blood (collected 26 Aug 2020 16:46)  Source: .Blood Blood  Preliminary Report (27 Aug 2020 17:01):    No growth to date.       Anemia Panel:      Thyroid Panel:                RADIOLOGY & ADDITIONAL TESTS:      HEALTH ISSUES - PROBLEM Dx:  Type 1 diabetes mellitus with stage 5 chronic kidney disease not on chronic dialysis: Type 1 diabetes mellitus with stage 5 chronic kidney disease not on chronic dialysis  Type 1 diabetes mellitus with stage 5 chronic kidney disease not on chronic dialysis: Type 1 diabetes mellitus with stage 5 chronic kidney disease not on chronic dialysis  Type 1 diabetes mellitus with stage 5 chronic kidney disease not on chronic dialysis: Type 1 diabetes mellitus with stage 5 chronic kidney disease not on chronic dialysis  Type 1 diabetes mellitus with stage 5 chronic kidney disease not on chronic dialysis: Type 1 diabetes mellitus with stage 5 chronic kidney disease not on chronic dialysis  Osteomyelitis: You had an infection of the bone in your left 3rd digit and right 2nd digit  -you were given rocephin which is an antibiotic  Need for prophylactic measure: Need for prophylactic measure  Hypertension: Hypertension  Hyperlipidemia: Hyperlipidemia  Diabetes mellitus: Diabetes mellitus  Renal transplant recipient: Renal transplant recipient  CKD (chronic kidney disease): CKD (chronic kidney disease)  Diabetic peripheral neuropathy: Diabetic peripheral neuropathy  CVA (cerebral vascular accident): CVA (cerebral vascular accident)  Diabetic ulcer of toe: Diabetic ulcer of toe  Diabetic ulcer of toe of left foot: Diabetic ulcer of toe of left foot        Consultant(s) Notes Reviewed:  [  ] YES     Care Discussed with [ x ] Consultants, [  ] Patient, [  ] Family, [  ] HCP, [  ] , [  ] Social Service, [  ] RN, [  ] Physical Therapy, [  ] Palliative Care Team  DVT PPX: [  ] Lovenox, [  ] SC Heparin, [  ] Coumadin, [  ] Xarelto, [  ] Eliquis, [  ] Pradaxa, [  ] IV Heparin drip, [  ] SCD, [  ] Ambulation, [  ] Contraindicated 2/2 GI Bleed, [  ] Contraindicated 2/2  Bleed, [  ] Contraindicated 2/2 Brain Bleed  Advanced Directive: [  ] None, [  ] DNR/DNI Patient is a 54y old  Male who presents with a chief complaint of r/o osteomyelitis (30 Aug 2020 18:10)    53 yo M with PMHx of LDRT / right renal transplant for CKD (2013), T1DM on insulin pump, HTN, HLD, [place] CVA (2015) - residual deficits of loss of pain and temperature sensation in left arm and right face, hx of DVT, 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 Noticed the wounds to the distal L 3rd digit about 2 months ago. Patient has been applying collagenase but showed no improvement. Has been seeing a podiatrist outpatient but wounds are not healing so was sent to wound care for additional management. Sent in by Dr. Araiza today to r/o osteomyelitis. & Possible surgery/amputation of left foot 3rd toe distal digit & Rt foot 2nd toe distal digit . Patient also has a burn rash on his chest that he acquired while cooking with oil 3 weeks ago. Scheduled for MRI of left foot but patient states he is unable to have MRI due to having metal coils in his left inner ear. Denies fevers, chills, SOB, chest pain, pain in distal digital wounds.    In the ED, VS T98.8F HR 64 /71 RR 18 SpO2 94% on RA. Labs significant for mildly elevated ESR 25, normal WBC 7.44, BUN/Cr 26/1.40,   Received Vanco and Zosyn x1 in ED.  CXR showing mildly elevated R hemidiaphragm. No focal consolidation.  EKG showing 1st degree AV block  -Pt seen by ID Dr Saucedo, On IV Abx       INTERVAL HPI:  8/27/20: Patient seen and examined at bedside. No acute overnight events. Patient denies any foot pain, extremity tingling, changes in vision, chest pain, SOB, palpitations, nausea, vomiting, hematuria, urinary incontinence, or blood in stools. Patient says he was able to sleep well overnight as well as ambulate to use the bathroom. Pt is currently awaiting surgery for the amputation of L 3rd digit and R 2nd digit. On IV Abx     8/28/20: Patient seen and examined at bedside POD 1 s/p partial amputation of R 2nd toe and L 3rd toe. No acute over night events. Patient admits to resting in bed and "staying off his feet" since the surgery. Patient has been using incentive spirometry at short intervals as well as urination in a urinal. Patient denies any bowel movements since yesterday but admits to passage of gas. Patient  denies any foot pain, extremity tingling, changes in vision, chest pain, SOB, palpitations, nausea, vomiting, abdominal pain, or hematuria. Patient had no difficulty with sleep or pain over night. No acute complaints. PT eval     8/29/20: Patient seen and examined at bedside POD 2 s/p partial amputation of R 2nd toe and L 3rd toe. No acute over night events. Patient states he had BM this morning. Eating and drinking well. Patient denies having any lower extremity pain. Offers no acute complaints.     8/30/20: Patient seen and examined at bedside POD 3 s/p partial amputation of R 2nd toe and L 3rd toe. No acute overnight events. Patient offers no acute complaints. Sitting comfortably in bed. Patient's family brought CPAP yesterday which patient used over night.     8/31/20: Patient seen and examined at bedside POD 4 s/p partial amputation of R 2nd toe and L 3rd toe. No acute overnight events. Patient has been ambulating and using his incentive spirometry. Seated comfortably at bedside. Eating and drinking well. Offers no acute complaints. Continued use of CPAP overnight. No lower extremity pain or numbness.     OVERNIGHT EVENTS: none     Home Medications:  aspirin 325 mg oral tablet: 1 tab(s) orally once a day (26 Aug 2020 12:06)  Cozaar 25 mg oral tablet: 1 tab(s) orally once a day (26 Aug 2020 12:06)  famotidine 20 mg oral tablet: 1 tab(s) orally once a day (26 Aug 2020 12:06)  gabapentin 300 mg oral capsule: 1 cap(s) orally 2 times a day (26 Aug 2020 12:06)  lovastatin 40 mg oral tablet: 1 tab(s) orally once a day (26 Aug 2020 12:06)  Metoprolol Tartrate 25 mg oral tablet: 3 tab(s) orally 2 times a day    *conf. with patient and pharmacy. 75mg dose, BID* (26 Aug 2020 12:06)  tacrolimus 0.5 mg oral capsule: 3 cap(s) orally 2 times a day (26 Aug 2020 12:06)      MEDICATIONS  (STANDING):  aspirin 325 milliGRAM(s) Oral daily  atorvastatin 10 milliGRAM(s) Oral at bedtime  azaTHIOprine 100 milliGRAM(s) Oral daily  cefTRIAXone   IVPB 2000 milliGRAM(s) IV Intermittent every 24 hours  cloNIDine 0.1 milliGRAM(s) Oral every 12 hours  dextrose 5%. 1000 milliLiter(s) (50 mL/Hr) IV Continuous <Continuous>  dextrose 5%. 1000 milliLiter(s) (50 mL/Hr) IV Continuous <Continuous>  dextrose 50% Injectable 12.5 Gram(s) IV Push once  dextrose 50% Injectable 25 Gram(s) IV Push once  dextrose 50% Injectable 25 Gram(s) IV Push once  dextrose 50% Injectable 12.5 Gram(s) IV Push once  dextrose 50% Injectable 25 Gram(s) IV Push once  dextrose 50% Injectable 25 Gram(s) IV Push once  famotidine    Tablet 20 milliGRAM(s) Oral daily  gabapentin 300 milliGRAM(s) Oral two times a day  heparin   Injectable 5000 Unit(s) SubCutaneous every 8 hours  hydrALAZINE 25 milliGRAM(s) Oral every 8 hours  insulin lispro (HumaLOG) Pump 1 Each SubCutaneous Continuous Pump  losartan 50 milliGRAM(s) Oral daily  metoprolol tartrate 75 milliGRAM(s) Oral two times a day  tacrolimus 1.5 milliGRAM(s) Oral two times a day  trimethoprim   80 mG/sulfamethoxazole 400 mG 1 Tablet(s) Oral daily    MEDICATIONS  (PRN):  dextrose 40% Gel 15 Gram(s) Oral once PRN Blood Glucose LESS THAN 70 milliGRAM(s)/deciliter  dextrose 40% Gel 15 Gram(s) Oral once PRN Blood Glucose LESS THAN 70 milliGRAM(s)/deciliter  glucagon  Injectable 1 milliGRAM(s) IntraMuscular once PRN Glucose LESS THAN 70 milligrams/deciliter  glucagon  Injectable 1 milliGRAM(s) IntraMuscular once PRN Glucose LESS THAN 70 milligrams/deciliter      No Known Allergies      Social History:  Never a smoker, drinks ETOH once every few weeks, denies any illicit drugs  independent in ADLs, walks without assistance, lives at home with wife (26 Aug 2020 12:52)      REVIEW OF SYSTEMS:  CONSTITUTIONAL: No fever, No chills, No fatigue  EYES: No visual disturbances  NECK: No pain  RESPIRATORY: No cough, No wheezing, No shortness of breath  CARDIOVASCULAR: No chest pain, No palpitations  GASTROINTESTINAL: No abdominal pain, No epigastric pain. No nausea, No vomiting, No diarrhea, No constipation; [x  ] BM  GENITOURINARY: No dysuria, No urgency, No hematuria, No incontinence  NEUROLOGICAL: No headaches, No dizziness, No numbness, No tingling  EXTREMITIES: No Swelling, No Pain, No Edema  SKIN: [  ] No itching, burning, rashes, or lesions   MUSCULOSKELETAL: No joint pain,  No extremity pain  PSYCHIATRIC: No difficulty sleeping at night  PAIN SCALE: [ x ] None  [  ] Other-  ROS Unable to obtain due to: [  ] Dementia  [  ] Lethargy  [  ] Sedated  [  ] Non verbal  REST OF REVIEW OF SYSTEMS: [  ] Normal     Vital Signs Last 24 Hrs  T(C): 36.6 (31 Aug 2020 05:05), Max: 37.1 (30 Aug 2020 16:50)  T(F): 97.8 (31 Aug 2020 05:05), Max: 98.8 (30 Aug 2020 16:50)  HR: 62 (31 Aug 2020 05:05) (60 - 71)  BP: 114/69 (31 Aug 2020 05:05) (114/69 - 144/79)  BP(mean): --  RR: 18 (31 Aug 2020 05:05) (18 - 18)  SpO2: 94% (31 Aug 2020 05:05) (94% - 95%)    CAPILLARY BLOOD GLUCOSE      POCT Blood Glucose.: 187 mg/dL (30 Aug 2020 21:14)  POCT Blood Glucose.: 151 mg/dL (30 Aug 2020 16:30)  POCT Blood Glucose.: 140 mg/dL (30 Aug 2020 12:04)      I&O's Summary    30 Aug 2020 07:01  -  31 Aug 2020 07:00  --------------------------------------------------------  IN: 600 mL / OUT: 0 mL / NET: 600 mL      PHYSICAL EXAM:  GENERAL:  [  ] NAD, [ x ] Well appearing, [  ] Agitated, [  ] Drowsy, [  ] Lethargy, [  ] Confused   HEAD:  [ x ] Normal, [  ] Other  EYES:  [  ] EOMI, [x ] PERRLA, [x  ] Conjunctiva and sclera clear normal, [  ] Other, [  ] Pallor, [  ] Discharge  ENMT:  [  ] Normal, [ x ] Moist mucous membranes, [x ] Good dentition, [  ] No thrush  NECK:  [ x ] Supple, [  ] No JVD, [  ] Normal thyroid, [  ] Lymphadenopathy, [  ] Other  CHEST/LUNG:  [ x ] Clear to auscultation bilaterally, [ x ] Breath Sounds equal B/L / decreased, [  ] Poor effort, [x] No rales, [x  ] No rhonchi, [ x ] No wheezing  HEART:  [ x ] Regular rate and rhythm, [  ] Tachycardia, [  ] Bradycardia, [  ] Irregular, [  ] No murmurs, No rubs, No gallops, [  ] PPM in place (Mfr:  )  ABDOMEN:  [ x ] Soft, [x  ] Nontender, [x  ] Nondistended, [ x ] No mass, [ x ] Bowel sounds present, [  ] Obese  NERVOUS SYSTEM:  [ x ] Alert & Oriented x3, [  ] Nonfocal, [  ] Confusion, [  ] Encephalopathic, [  ] Sedated, [  ] Unable to assess, [  ] Dementia, [  ] Other-  EXTREMITIES: [x  ] 2+ Peripheral Pulses, No clubbing, No cyanosis,  [  ] Edema B/L lower EXT, [  ] PVD stasis skin changes B/L lower EXT, [  ] Wound  SKIN:  R lower extremity lesion  [  ] No rashes or lesions, [  ] Pressure ulcers, [  ] Ecchymosis, [  ] Skin tears, [  ] Other    DIET: Diet, Regular:   Consistent Carbohydrate No Snacks  DASH/TLC Sodium & Cholesterol Restricted  No Concentrated Potassium  No Concentrated Phosphorus (08-27-20 @ 14:36)      LABS:      Ca    9.1        30 Aug 2020 05:59          Culture Results:   No growth (08-27 @ 22:01)  Culture Results:   Rare Coag Negative Staphylococcus (08-27 @ 22:01)  Culture Results:   No growth to date. (08-26 @ 16:46)  Culture Results:   No growth to date. (08-26 @ 16:46)            Culture - Tissue with Gram Stain (collected 27 Aug 2020 22:01)  Source: .Tissue Other, distal left 3rd toe  Gram Stain (27 Aug 2020 23:26):    No polymorphonuclear cells seen per low power field    No organisms seen per oil power field  Preliminary Report (29 Aug 2020 17:01):    Rare Coag Negative Staphylococcus  Organism: Coag Negative Staphylococcus (30 Aug 2020 19:55)  Organism: Coag Negative Staphylococcus (30 Aug 2020 19:55)    Culture - Tissue with Gram Stain (collected 27 Aug 2020 22:01)  Source: .Tissue Other, distal right 2nd toe  Gram Stain (27 Aug 2020 23:26):    No polymorphonuclear cells seen per low power field    No organisms seen per oil power field  Preliminary Report (28 Aug 2020 15:56):    No growth    Culture - Blood (collected 26 Aug 2020 16:46)  Source: .Blood Blood  Preliminary Report (27 Aug 2020 17:01):    No growth to date.    Culture - Blood (collected 26 Aug 2020 16:46)  Source: .Blood Blood  Preliminary Report (27 Aug 2020 17:01):    No growth to date.       Anemia Panel:      Thyroid Panel:                RADIOLOGY & ADDITIONAL TESTS:      HEALTH ISSUES - PROBLEM Dx:  Type 1 diabetes mellitus with stage 5 chronic kidney disease not on chronic dialysis: Type 1 diabetes mellitus with stage 5 chronic kidney disease not on chronic dialysis  Type 1 diabetes mellitus with stage 5 chronic kidney disease not on chronic dialysis: Type 1 diabetes mellitus with stage 5 chronic kidney disease not on chronic dialysis  Type 1 diabetes mellitus with stage 5 chronic kidney disease not on chronic dialysis: Type 1 diabetes mellitus with stage 5 chronic kidney disease not on chronic dialysis  Type 1 diabetes mellitus with stage 5 chronic kidney disease not on chronic dialysis: Type 1 diabetes mellitus with stage 5 chronic kidney disease not on chronic dialysis  Osteomyelitis: You had an infection of the bone in your left 3rd digit and right 2nd digit  -you were given rocephin which is an antibiotic  Need for prophylactic measure: Need for prophylactic measure  Hypertension: Hypertension  Hyperlipidemia: Hyperlipidemia  Diabetes mellitus: Diabetes mellitus  Renal transplant recipient: Renal transplant recipient  CKD (chronic kidney disease): CKD (chronic kidney disease)  Diabetic peripheral neuropathy: Diabetic peripheral neuropathy  CVA (cerebral vascular accident): CVA (cerebral vascular accident)  Diabetic ulcer of toe: Diabetic ulcer of toe  Diabetic ulcer of toe of left foot: Diabetic ulcer of toe of left foot        Consultant(s) Notes Reviewed:  [  ] YES     Care Discussed with [ x ] Consultants, [  ] Patient, [  ] Family, [  ] HCP, [  ] , [  ] Social Service, [  ] RN, [  ] Physical Therapy, [  ] Palliative Care Team  DVT PPX: [  ] Lovenox, [  ] SC Heparin, [  ] Coumadin, [  ] Xarelto, [  ] Eliquis, [  ] Pradaxa, [  ] IV Heparin drip, [  ] SCD, [  ] Ambulation, [  ] Contraindicated 2/2 GI Bleed, [  ] Contraindicated 2/2  Bleed, [  ] Contraindicated 2/2 Brain Bleed  Advanced Directive: [  ] None, [  ] DNR/DNI Patient is a 54y old  Male who presents with a chief complaint of r/o osteomyelitis (30 Aug 2020 18:10)    55 yo M with PMHx of LDRT / right renal transplant for CKD (2013), T1DM on insulin pump, HTN, HLD, [place] CVA (2015) - residual deficits of loss of pain and temperature sensation in left arm and right face, hx of DVT, 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 Noticed the wounds to the distal L 3rd digit about 2 months ago. Patient has been applying collagenase but showed no improvement. Has been seeing a podiatrist outpatient but wounds are not healing so was sent to wound care for additional management. Sent in by Dr. Araiza today to r/o osteomyelitis. & Possible surgery/amputation of left foot 3rd toe distal digit & Rt foot 2nd toe distal digit . Patient also has a burn rash on his chest that he acquired while cooking with oil 3 weeks ago. Scheduled for MRI of left foot but patient states he is unable to have MRI due to having metal coils in his left inner ear. Denies fevers, chills, SOB, chest pain, pain in distal digital wounds.    In the ED, VS T98.8F HR 64 /71 RR 18 SpO2 94% on RA. Labs significant for mildly elevated ESR 25, normal WBC 7.44, BUN/Cr 26/1.40,   Received Vanco and Zosyn x1 in ED.  CXR showing mildly elevated R hemidiaphragm. No focal consolidation.  EKG showing 1st degree AV block  -Pt seen by ID Dr Saucedo, On IV Abx       INTERVAL HPI:  8/27/20: Patient seen and examined at bedside. No acute overnight events. Patient denies any foot pain, extremity tingling, changes in vision, chest pain, SOB, palpitations, nausea, vomiting, hematuria, urinary incontinence, or blood in stools. Patient says he was able to sleep well overnight as well as ambulate to use the bathroom. Pt is currently awaiting surgery for the amputation of L 3rd digit and R 2nd digit. On IV Abx     8/28/20: Patient seen and examined at bedside POD 1 s/p partial amputation of R 2nd toe and L 3rd toe. No acute over night events. Patient admits to resting in bed and "staying off his feet" since the surgery. Patient has been using incentive spirometry at short intervals as well as urination in a urinal. Patient denies any bowel movements since yesterday but admits to passage of gas. Patient  denies any foot pain, extremity tingling, changes in vision, chest pain, SOB, palpitations, nausea, vomiting, abdominal pain, or hematuria. Patient had no difficulty with sleep or pain over night. No acute complaints. PT eval     8/29/20: Patient seen and examined at bedside POD 2 s/p partial amputation of R 2nd toe and L 3rd toe. No acute over night events. Patient states he had BM this morning. Eating and drinking well. Patient denies having any lower extremity pain. Offers no acute complaints.     8/30/20: Patient seen and examined at bedside POD 3 s/p partial amputation of R 2nd toe and L 3rd toe. No acute overnight events. Patient offers no acute complaints. Sitting comfortably in bed. Patient's family brought CPAP yesterday which patient used over night.     8/31/20: Patient seen and examined at bedside POD 4 s/p partial amputation of R 2nd toe and L 3rd toe. No acute overnight events. Patient has been ambulating and using his incentive spirometry. Seated comfortably at bedside. Eating and drinking well. Offers no acute complaints. Continued use of CPAP overnight. No lower extremity pain or numbness.     OVERNIGHT EVENTS: none     Home Medications:  aspirin 325 mg oral tablet: 1 tab(s) orally once a day (26 Aug 2020 12:06)  Cozaar 25 mg oral tablet: 1 tab(s) orally once a day (26 Aug 2020 12:06)  famotidine 20 mg oral tablet: 1 tab(s) orally once a day (26 Aug 2020 12:06)  gabapentin 300 mg oral capsule: 1 cap(s) orally 2 times a day (26 Aug 2020 12:06)  lovastatin 40 mg oral tablet: 1 tab(s) orally once a day (26 Aug 2020 12:06)  Metoprolol Tartrate 25 mg oral tablet: 3 tab(s) orally 2 times a day    *conf. with patient and pharmacy. 75mg dose, BID* (26 Aug 2020 12:06)  tacrolimus 0.5 mg oral capsule: 3 cap(s) orally 2 times a day (26 Aug 2020 12:06)      MEDICATIONS  (STANDING):  aspirin 325 milliGRAM(s) Oral daily  atorvastatin 10 milliGRAM(s) Oral at bedtime  azaTHIOprine 100 milliGRAM(s) Oral daily  cefTRIAXone   IVPB 2000 milliGRAM(s) IV Intermittent every 24 hours  cloNIDine 0.1 milliGRAM(s) Oral every 12 hours  dextrose 5%. 1000 milliLiter(s) (50 mL/Hr) IV Continuous <Continuous>  dextrose 5%. 1000 milliLiter(s) (50 mL/Hr) IV Continuous <Continuous>  dextrose 50% Injectable 12.5 Gram(s) IV Push once  dextrose 50% Injectable 25 Gram(s) IV Push once  dextrose 50% Injectable 25 Gram(s) IV Push once  dextrose 50% Injectable 12.5 Gram(s) IV Push once  dextrose 50% Injectable 25 Gram(s) IV Push once  dextrose 50% Injectable 25 Gram(s) IV Push once  famotidine    Tablet 20 milliGRAM(s) Oral daily  gabapentin 300 milliGRAM(s) Oral two times a day  heparin   Injectable 5000 Unit(s) SubCutaneous every 8 hours  hydrALAZINE 25 milliGRAM(s) Oral every 8 hours  insulin lispro (HumaLOG) Pump 1 Each SubCutaneous Continuous Pump  losartan 50 milliGRAM(s) Oral daily  metoprolol tartrate 75 milliGRAM(s) Oral two times a day  tacrolimus 1.5 milliGRAM(s) Oral two times a day  trimethoprim   80 mG/sulfamethoxazole 400 mG 1 Tablet(s) Oral daily    MEDICATIONS  (PRN):  dextrose 40% Gel 15 Gram(s) Oral once PRN Blood Glucose LESS THAN 70 milliGRAM(s)/deciliter  dextrose 40% Gel 15 Gram(s) Oral once PRN Blood Glucose LESS THAN 70 milliGRAM(s)/deciliter  glucagon  Injectable 1 milliGRAM(s) IntraMuscular once PRN Glucose LESS THAN 70 milligrams/deciliter  glucagon  Injectable 1 milliGRAM(s) IntraMuscular once PRN Glucose LESS THAN 70 milligrams/deciliter      No Known Allergies      Social History:  Never a smoker, drinks ETOH once every few weeks, denies any illicit drugs  independent in ADLs, walks without assistance, lives at home with wife (26 Aug 2020 12:52)      REVIEW OF SYSTEMS:  CONSTITUTIONAL: No fever, No chills, No fatigue  EYES: No visual disturbances  NECK: No pain  RESPIRATORY: No cough, No wheezing, No shortness of breath  CARDIOVASCULAR: No chest pain, No palpitations  GASTROINTESTINAL: No abdominal pain, No epigastric pain. No nausea, No vomiting, No diarrhea, No constipation; [x  ] BM  GENITOURINARY: No dysuria, No urgency, No hematuria, No incontinence  NEUROLOGICAL: No headaches, No dizziness, No numbness, No tingling  EXTREMITIES: No Swelling, No Pain, No Edema  SKIN: [  ] No itching, burning, rashes, or lesions   MUSCULOSKELETAL: No joint pain,  No extremity pain  PSYCHIATRIC: No difficulty sleeping at night  PAIN SCALE: [ x ] None  [  ] Other-  ROS Unable to obtain due to: [  ] Dementia  [  ] Lethargy  [  ] Sedated  [  ] Non verbal  REST OF REVIEW OF SYSTEMS: [ x ] Normal     Vital Signs Last 24 Hrs  T(C): 36.6 (31 Aug 2020 05:05), Max: 37.1 (30 Aug 2020 16:50)  T(F): 97.8 (31 Aug 2020 05:05), Max: 98.8 (30 Aug 2020 16:50)  HR: 62 (31 Aug 2020 05:05) (60 - 71)  BP: 114/69 (31 Aug 2020 05:05) (114/69 - 144/79)  BP(mean): --  RR: 18 (31 Aug 2020 05:05) (18 - 18)  SpO2: 94% (31 Aug 2020 05:05) (94% - 95%)    CAPILLARY BLOOD GLUCOSE      POCT Blood Glucose.: 187 mg/dL (30 Aug 2020 21:14)  POCT Blood Glucose.: 151 mg/dL (30 Aug 2020 16:30)  POCT Blood Glucose.: 140 mg/dL (30 Aug 2020 12:04)      I&O's Summary    30 Aug 2020 07:01  -  31 Aug 2020 07:00  --------------------------------------------------------  IN: 600 mL / OUT: 0 mL / NET: 600 mL      PHYSICAL EXAM:  GENERAL:  [ x ] NAD, [ x ] Well appearing, [  ] Agitated, [  ] Drowsy, [  ] Lethargy, [  ] Confused   HEAD:  [ x ] Normal, [  ] Other  EYES:  [x  ] EOMI, [x ] PERRLA, [x  ] Conjunctiva and sclera clear normal, [  ] Other, [  ] Pallor, [  ] Discharge  ENMT:  [ x ] Normal, [ x ] Moist mucous membranes, [x ] Good dentition, [  ] No thrush  NECK:  [ x ] Supple, [x  ] No JVD, [x  ] Normal thyroid, [  ] Lymphadenopathy, [  ] Other  CHEST/LUNG:  [ x ] Clear to auscultation bilaterally, [ x ] Breath Sounds equal B/L / decreased, [  ] Poor effort, [x] No rales, [x  ] No rhonchi, [ x ] No wheezing  HEART:  [ x ] Regular rate and rhythm, [  ] Tachycardia, [  ] Bradycardia, [  ] Irregular, [ x ] No murmurs, No rubs, No gallops, [x  ] PPM in place (Mfr:  )  ABDOMEN:  [ x ] Soft, [x  ] Nontender, [x  ] Nondistended, [ x ] No mass, [ x ] Bowel sounds present, [x  ] Obese  NERVOUS SYSTEM:  [ x ] Alert & Oriented x3, [x  ] Nonfocal, [  ] Confusion, [  ] Encephalopathic, [  ] Sedated, [  ] Unable to assess, [  ] Dementia, [  ] Other-  EXTREMITIES: [x  ] 2+ Peripheral Pulses, No clubbing, No cyanosis,  [  ] Edema B/L lower EXT, [ x ] PVD stasis skin changes B/L lower EXT, [  ] Wound  SKIN:  R lower extremity lesion  [  ] No rashes or lesions, [  ] Pressure ulcers, [  ] Ecchymosis, [  ] Skin tears, [ x ] improving superficial burn overlying chest    DIET: Diet, Regular:   Consistent Carbohydrate No Snacks  DASH/TLC Sodium & Cholesterol Restricted  No Concentrated Potassium  No Concentrated Phosphorus (08-27-20 @ 14:36)      LABS:      Ca    9.1        30 Aug 2020 05:59          Culture Results:   No growth (08-27 @ 22:01)  Culture Results:   Rare Coag Negative Staphylococcus (08-27 @ 22:01)  Culture Results:   No growth to date. (08-26 @ 16:46)  Culture Results:   No growth to date. (08-26 @ 16:46)            Culture - Tissue with Gram Stain (collected 27 Aug 2020 22:01)  Source: .Tissue Other, distal left 3rd toe  Gram Stain (27 Aug 2020 23:26):    No polymorphonuclear cells seen per low power field    No organisms seen per oil power field  Preliminary Report (29 Aug 2020 17:01):    Rare Coag Negative Staphylococcus  Organism: Coag Negative Staphylococcus (30 Aug 2020 19:55)  Organism: Coag Negative Staphylococcus (30 Aug 2020 19:55)    Culture - Tissue with Gram Stain (collected 27 Aug 2020 22:01)  Source: .Tissue Other, distal right 2nd toe  Gram Stain (27 Aug 2020 23:26):    No polymorphonuclear cells seen per low power field    No organisms seen per oil power field  Preliminary Report (28 Aug 2020 15:56):    No growth    Culture - Blood (collected 26 Aug 2020 16:46)  Source: .Blood Blood  Preliminary Report (27 Aug 2020 17:01):    No growth to date.    Culture - Blood (collected 26 Aug 2020 16:46)  Source: .Blood Blood  Preliminary Report (27 Aug 2020 17:01):    No growth to date.       Anemia Panel:      Thyroid Panel:                RADIOLOGY & ADDITIONAL TESTS:      HEALTH ISSUES - PROBLEM Dx:  Type 1 diabetes mellitus with stage 5 chronic kidney disease not on chronic dialysis: Type 1 diabetes mellitus with stage 5 chronic kidney disease not on chronic dialysis  Type 1 diabetes mellitus with stage 5 chronic kidney disease not on chronic dialysis: Type 1 diabetes mellitus with stage 5 chronic kidney disease not on chronic dialysis  Type 1 diabetes mellitus with stage 5 chronic kidney disease not on chronic dialysis: Type 1 diabetes mellitus with stage 5 chronic kidney disease not on chronic dialysis  Type 1 diabetes mellitus with stage 5 chronic kidney disease not on chronic dialysis: Type 1 diabetes mellitus with stage 5 chronic kidney disease not on chronic dialysis  Osteomyelitis: You had an infection of the bone in your left 3rd digit and right 2nd digit  -you were given rocephin which is an antibiotic  Need for prophylactic measure: Need for prophylactic measure  Hypertension: Hypertension  Hyperlipidemia: Hyperlipidemia  Diabetes mellitus: Diabetes mellitus  Renal transplant recipient: Renal transplant recipient  CKD (chronic kidney disease): CKD (chronic kidney disease)  Diabetic peripheral neuropathy: Diabetic peripheral neuropathy  CVA (cerebral vascular accident): CVA (cerebral vascular accident)  Diabetic ulcer of toe: Diabetic ulcer of toe  Diabetic ulcer of toe of left foot: Diabetic ulcer of toe of left foot        Consultant(s) Notes Reviewed:  [  x] YES     Care Discussed with [ x ] Consultants, [  ] Patient, [  ] Family, [  ] HCP, [  ] , [  ] Social Service, [  ] RN, [  ] Physical Therapy, [  ] Palliative Care Team  DVT PPX: [  ] Lovenox, [ x ] SC Heparin, [  ] Coumadin, [  ] Xarelto, [  ] Eliquis, [  ] Pradaxa, [  ] IV Heparin drip, [  ] SCD, [  ] Ambulation, [  ] Contraindicated 2/2 GI Bleed, [  ] Contraindicated 2/2  Bleed, [  ] Contraindicated 2/2 Brain Bleed  Advanced Directive: [  ] None, [  ] DNR/DNI Patient is a 54y old  Male who presents with a chief complaint of r/o osteomyelitis (30 Aug 2020 18:10)    55 yo M with PMHx of LDRT / right renal transplant for CKD (2013), T1DM on insulin pump, HTN, HLD, [place] CVA (2015) - residual deficits of loss of pain and temperature sensation in left arm and right face, hx of DVT, 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 Noticed the wounds to the distal L 3rd digit about 2 months ago. Patient has been applying collagenase but showed no improvement. Has been seeing a podiatrist outpatient but wounds are not healing so was sent to wound care for additional management. Sent in by Dr. Araiza today to r/o osteomyelitis. & Possible surgery/amputation of left foot 3rd toe distal digit & Rt foot 2nd toe distal digit . Patient also has a burn rash on his chest that he acquired while cooking with oil 3 weeks ago. Scheduled for MRI of left foot but patient states he is unable to have MRI due to having metal coils in his left inner ear. Denies fevers, chills, SOB, chest pain, pain in distal digital wounds.    In the ED, VS T98.8F HR 64 /71 RR 18 SpO2 94% on RA. Labs significant for mildly elevated ESR 25, normal WBC 7.44, BUN/Cr 26/1.40,   Received Vanco and Zosyn x1 in ED.  CXR showing mildly elevated R hemidiaphragm. No focal consolidation.  EKG showing 1st degree AV block  -Pt seen by ID Dr Saucedo, On IV Abx       INTERVAL HPI:  8/27/20: Patient seen and examined at bedside. No acute overnight events. Patient denies any foot pain, extremity tingling, changes in vision, chest pain, SOB, palpitations, nausea, vomiting, hematuria, urinary incontinence, or blood in stools. Patient says he was able to sleep well overnight as well as ambulate to use the bathroom. Pt is currently awaiting surgery for the amputation of L 3rd digit and R 2nd digit. On IV Abx     8/28/20: Patient seen and examined at bedside POD 1 s/p partial amputation of R 2nd toe and L 3rd toe. No acute over night events. Patient admits to resting in bed and "staying off his feet" since the surgery. Patient has been using incentive spirometry at short intervals as well as urination in a urinal. Patient denies any bowel movements since yesterday but admits to passage of gas. Patient  denies any foot pain, extremity tingling, changes in vision, chest pain, SOB, palpitations, nausea, vomiting, abdominal pain, or hematuria. Patient had no difficulty with sleep or pain over night. No acute complaints. PT eval     8/29/20: Patient seen and examined at bedside POD 2 s/p partial amputation of R 2nd toe and L 3rd toe. No acute over night events. Patient states he had BM this morning. Eating and drinking well. Patient denies having any lower extremity pain. Offers no acute complaints.     8/30/20: Patient seen and examined at bedside POD 3 s/p partial amputation of R 2nd toe and L 3rd toe. No acute overnight events. Patient offers no acute complaints. Sitting comfortably in bed. Patient's family brought CPAP yesterday which patient used over night.     8/31/20: Patient seen and examined at bedside POD 4 s/p partial amputation of R 2nd toe and L 3rd toe. No acute overnight events. Patient has been ambulating and using his incentive spirometry. Seated comfortably at bedside. Eating and drinking well. Offers no acute complaints. Continued use of CPAP overnight. No lower extremity pain or numbness.     OVERNIGHT EVENTS: none     Home Medications:  aspirin 325 mg oral tablet: 1 tab(s) orally once a day (26 Aug 2020 12:06)  Cozaar 25 mg oral tablet: 1 tab(s) orally once a day (26 Aug 2020 12:06)  famotidine 20 mg oral tablet: 1 tab(s) orally once a day (26 Aug 2020 12:06)  gabapentin 300 mg oral capsule: 1 cap(s) orally 2 times a day (26 Aug 2020 12:06)  lovastatin 40 mg oral tablet: 1 tab(s) orally once a day (26 Aug 2020 12:06)  Metoprolol Tartrate 25 mg oral tablet: 3 tab(s) orally 2 times a day    *conf. with patient and pharmacy. 75mg dose, BID* (26 Aug 2020 12:06)  tacrolimus 0.5 mg oral capsule: 3 cap(s) orally 2 times a day (26 Aug 2020 12:06)      MEDICATIONS  (STANDING):  aspirin 325 milliGRAM(s) Oral daily  atorvastatin 10 milliGRAM(s) Oral at bedtime  azaTHIOprine 100 milliGRAM(s) Oral daily  cefTRIAXone   IVPB 2000 milliGRAM(s) IV Intermittent every 24 hours  cloNIDine 0.1 milliGRAM(s) Oral every 12 hours  dextrose 5%. 1000 milliLiter(s) (50 mL/Hr) IV Continuous <Continuous>  dextrose 5%. 1000 milliLiter(s) (50 mL/Hr) IV Continuous <Continuous>  dextrose 50% Injectable 12.5 Gram(s) IV Push once  dextrose 50% Injectable 25 Gram(s) IV Push once  dextrose 50% Injectable 25 Gram(s) IV Push once  dextrose 50% Injectable 12.5 Gram(s) IV Push once  dextrose 50% Injectable 25 Gram(s) IV Push once  dextrose 50% Injectable 25 Gram(s) IV Push once  famotidine    Tablet 20 milliGRAM(s) Oral daily  gabapentin 300 milliGRAM(s) Oral two times a day  heparin   Injectable 5000 Unit(s) SubCutaneous every 8 hours  hydrALAZINE 25 milliGRAM(s) Oral every 8 hours  insulin lispro (HumaLOG) Pump 1 Each SubCutaneous Continuous Pump  losartan 50 milliGRAM(s) Oral daily  metoprolol tartrate 75 milliGRAM(s) Oral two times a day  tacrolimus 1.5 milliGRAM(s) Oral two times a day  trimethoprim   80 mG/sulfamethoxazole 400 mG 1 Tablet(s) Oral daily    MEDICATIONS  (PRN):  dextrose 40% Gel 15 Gram(s) Oral once PRN Blood Glucose LESS THAN 70 milliGRAM(s)/deciliter  dextrose 40% Gel 15 Gram(s) Oral once PRN Blood Glucose LESS THAN 70 milliGRAM(s)/deciliter  glucagon  Injectable 1 milliGRAM(s) IntraMuscular once PRN Glucose LESS THAN 70 milligrams/deciliter  glucagon  Injectable 1 milliGRAM(s) IntraMuscular once PRN Glucose LESS THAN 70 milligrams/deciliter      No Known Allergies      Social History:  Never a smoker, drinks ETOH once every few weeks, denies any illicit drugs  independent in ADLs, walks without assistance, lives at home with wife (26 Aug 2020 12:52)      REVIEW OF SYSTEMS:  CONSTITUTIONAL: No fever, No chills, No fatigue  EYES: No visual disturbances  NECK: No pain  RESPIRATORY: No cough, No wheezing, No shortness of breath  CARDIOVASCULAR: No chest pain, No palpitations  GASTROINTESTINAL: No abdominal pain, No epigastric pain. No nausea, No vomiting, No diarrhea, No constipation; [x  ] BM  GENITOURINARY: No dysuria, No urgency, No hematuria, No incontinence  NEUROLOGICAL: No headaches, No dizziness, No numbness, No tingling  EXTREMITIES: No Swelling, No Pain, No Edema  SKIN: [  ] No itching, burning, rashes, or lesions   MUSCULOSKELETAL: No joint pain,  No extremity pain  PSYCHIATRIC: No difficulty sleeping at night  PAIN SCALE: [ x ] None  [  ] Other-  ROS Unable to obtain due to: [  ] Dementia  [  ] Lethargy  [  ] Sedated  [  ] Non verbal  REST OF REVIEW OF SYSTEMS: [ x ] Normal     Vital Signs Last 24 Hrs  T(C): 36.6 (31 Aug 2020 05:05), Max: 37.1 (30 Aug 2020 16:50)  T(F): 97.8 (31 Aug 2020 05:05), Max: 98.8 (30 Aug 2020 16:50)  HR: 62 (31 Aug 2020 05:05) (60 - 71)  BP: 114/69 (31 Aug 2020 05:05) (114/69 - 144/79)  BP(mean): --  RR: 18 (31 Aug 2020 05:05) (18 - 18)  SpO2: 94% (31 Aug 2020 05:05) (94% - 95%)    CAPILLARY BLOOD GLUCOSE      POCT Blood Glucose.: 187 mg/dL (30 Aug 2020 21:14)  POCT Blood Glucose.: 151 mg/dL (30 Aug 2020 16:30)  POCT Blood Glucose.: 140 mg/dL (30 Aug 2020 12:04)      I&O's Summary    30 Aug 2020 07:01  -  31 Aug 2020 07:00  --------------------------------------------------------  IN: 600 mL / OUT: 0 mL / NET: 600 mL      PHYSICAL EXAM:  GENERAL:  [ x ] NAD, [ x ] Well appearing, [  ] Agitated, [  ] Drowsy, [  ] Lethargy, [  ] Confused   HEAD:  [ x ] Normal, [  ] Other  EYES:  [x  ] EOMI, [x ] PERRLA, [x  ] Conjunctiva and sclera clear normal, [  ] Other, [  ] Pallor, [  ] Discharge  ENMT:  [ x ] Normal, [ x ] Moist mucous membranes, [x ] Good dentition, [  ] No thrush  NECK:  [ x ] Supple, [x  ] No JVD, [x  ] Normal thyroid, [  ] Lymphadenopathy, [  ] Other  CHEST/LUNG:  [ x ] Clear to auscultation bilaterally, [ x ] Breath Sounds equal B/L [  ] Poor effort, [x] No rales, [x  ] No rhonchi, [ x ] No wheezing  HEART:  [ x ] Regular rate and rhythm, [  ] Tachycardia, [  ] Bradycardia, [  ] Irregular, [ x ] No murmurs, No rubs, No gallops, [x  ] PPM in place (Mfr:  )  ABDOMEN:  [ x ] Soft, [x  ] Nontender, [x  ] Nondistended, [ x ] No mass, [ x ] Bowel sounds present, [x  ] Obese  NERVOUS SYSTEM:  [ x ] Alert & Oriented x3, [x  ] Nonfocal, [  ] Confusion, [  ] Encephalopathic, [  ] Sedated, [  ] Unable to assess, [  ] Dementia, [  ] Other-  EXTREMITIES: [x  ] 2+ Peripheral Pulses, No clubbing, No cyanosis,  [  ] Edema B/L lower EXT, [ x ] PVD stasis skin changes B/L lower EXT, [  ] Wound  SKIN:  R lower extremity lesion  [  ] No rashes or lesions, [  ] Pressure ulcers, [  ] Ecchymosis, [  ] Skin tears, [ x ] improving superficial burn overlying chest    DIET: Diet, Regular:   Consistent Carbohydrate No Snacks  DASH/TLC Sodium & Cholesterol Restricted  No Concentrated Potassium  No Concentrated Phosphorus (08-27-20 @ 14:36)      LABS:      Ca    9.1        30 Aug 2020 05:59          Culture Results:   No growth (08-27 @ 22:01)  Culture Results:   Rare Coag Negative Staphylococcus (08-27 @ 22:01)  Culture Results:   No growth to date. (08-26 @ 16:46)  Culture Results:   No growth to date. (08-26 @ 16:46)            Culture - Tissue with Gram Stain (collected 27 Aug 2020 22:01)  Source: .Tissue Other, distal left 3rd toe  Gram Stain (27 Aug 2020 23:26):    No polymorphonuclear cells seen per low power field    No organisms seen per oil power field  Preliminary Report (29 Aug 2020 17:01):    Rare Coag Negative Staphylococcus  Organism: Coag Negative Staphylococcus (30 Aug 2020 19:55)  Organism: Coag Negative Staphylococcus (30 Aug 2020 19:55)    Culture - Tissue with Gram Stain (collected 27 Aug 2020 22:01)  Source: .Tissue Other, distal right 2nd toe  Gram Stain (27 Aug 2020 23:26):    No polymorphonuclear cells seen per low power field    No organisms seen per oil power field  Preliminary Report (28 Aug 2020 15:56):    No growth    Culture - Blood (collected 26 Aug 2020 16:46)  Source: .Blood Blood  Preliminary Report (27 Aug 2020 17:01):    No growth to date.    Culture - Blood (collected 26 Aug 2020 16:46)  Source: .Blood Blood  Preliminary Report (27 Aug 2020 17:01):    No growth to date.       Anemia Panel:      Thyroid Panel:                RADIOLOGY & ADDITIONAL TESTS:      HEALTH ISSUES - PROBLEM Dx:  Type 1 diabetes mellitus with stage 5 chronic kidney disease not on chronic dialysis: Type 1 diabetes mellitus with stage 5 chronic kidney disease not on chronic dialysis  Type 1 diabetes mellitus with stage 5 chronic kidney disease not on chronic dialysis: Type 1 diabetes mellitus with stage 5 chronic kidney disease not on chronic dialysis  Type 1 diabetes mellitus with stage 5 chronic kidney disease not on chronic dialysis: Type 1 diabetes mellitus with stage 5 chronic kidney disease not on chronic dialysis  Type 1 diabetes mellitus with stage 5 chronic kidney disease not on chronic dialysis: Type 1 diabetes mellitus with stage 5 chronic kidney disease not on chronic dialysis  Osteomyelitis: You had an infection of the bone in your left 3rd digit and right 2nd digit  -you were given rocephin which is an antibiotic  Need for prophylactic measure: Need for prophylactic measure  Hypertension: Hypertension  Hyperlipidemia: Hyperlipidemia  Diabetes mellitus: Diabetes mellitus  Renal transplant recipient: Renal transplant recipient  CKD (chronic kidney disease): CKD (chronic kidney disease)  Diabetic peripheral neuropathy: Diabetic peripheral neuropathy  CVA (cerebral vascular accident): CVA (cerebral vascular accident)  Diabetic ulcer of toe: Diabetic ulcer of toe  Diabetic ulcer of toe of left foot: Diabetic ulcer of toe of left foot        Consultant(s) Notes Reviewed:  [  x] YES     Care Discussed with [ x ] Consultants, [  ] Patient, [  ] Family, [  ] HCP, [  ] , [  ] Social Service, [  ] RN, [  ] Physical Therapy, [  ] Palliative Care Team  DVT PPX: [  ] Lovenox, [ x ] SC Heparin, [  ] Coumadin, [  ] Xarelto, [  ] Eliquis, [  ] Pradaxa, [  ] IV Heparin drip, [  ] SCD, [  ] Ambulation, [  ] Contraindicated 2/2 GI Bleed, [  ] Contraindicated 2/2  Bleed, [  ] Contraindicated 2/2 Brain Bleed  Advanced Directive: [  ] None, [  ] DNR/DNI

## 2020-08-31 NOTE — PROGRESS NOTE ADULT - SUBJECTIVE AND OBJECTIVE BOX
Patient is a 54y old  Male who presents with a chief complaint of r/o osteomyelitis (27 Aug 2020 11:10)  Patient seen in follow up for CKD, h/o Kidney transplant.     Feels well. No new complaints.        PAST MEDICAL HISTORY:  History of DVT (deep vein thrombosis)  Squamous cell carcinoma of skin of left ear  CVA (cerebral vascular accident)  Obesity (BMI 30-39.9)  Diabetic peripheral neuropathy  CKD (chronic kidney disease)  Diabetes mellitus  Hyperlipidemia  Hypertension    MEDICATIONS  (STANDING):  aspirin 325 milliGRAM(s) Oral daily  atorvastatin 10 milliGRAM(s) Oral at bedtime  azaTHIOprine 100 milliGRAM(s) Oral daily  cefTRIAXone   IVPB 2000 milliGRAM(s) IV Intermittent every 24 hours  cloNIDine 0.1 milliGRAM(s) Oral every 12 hours  dextrose 5%. 1000 milliLiter(s) (50 mL/Hr) IV Continuous <Continuous>  dextrose 5%. 1000 milliLiter(s) (50 mL/Hr) IV Continuous <Continuous>  dextrose 50% Injectable 12.5 Gram(s) IV Push once  dextrose 50% Injectable 25 Gram(s) IV Push once  dextrose 50% Injectable 25 Gram(s) IV Push once  dextrose 50% Injectable 12.5 Gram(s) IV Push once  dextrose 50% Injectable 25 Gram(s) IV Push once  dextrose 50% Injectable 25 Gram(s) IV Push once  famotidine    Tablet 20 milliGRAM(s) Oral daily  gabapentin 300 milliGRAM(s) Oral two times a day  heparin   Injectable 5000 Unit(s) SubCutaneous every 8 hours  hydrALAZINE 25 milliGRAM(s) Oral every 8 hours  insulin lispro (HumaLOG) Pump 1 Each SubCutaneous Continuous Pump  losartan 50 milliGRAM(s) Oral daily  metoprolol tartrate 75 milliGRAM(s) Oral two times a day  tacrolimus 1.5 milliGRAM(s) Oral two times a day  trimethoprim   80 mG/sulfamethoxazole 400 mG 1 Tablet(s) Oral daily    MEDICATIONS  (PRN):  dextrose 40% Gel 15 Gram(s) Oral once PRN Blood Glucose LESS THAN 70 milliGRAM(s)/deciliter  dextrose 40% Gel 15 Gram(s) Oral once PRN Blood Glucose LESS THAN 70 milliGRAM(s)/deciliter  glucagon  Injectable 1 milliGRAM(s) IntraMuscular once PRN Glucose LESS THAN 70 milligrams/deciliter  glucagon  Injectable 1 milliGRAM(s) IntraMuscular once PRN Glucose LESS THAN 70 milligrams/deciliter    T(C): 36.6 (08-31-20 @ 05:05), Max: 37.1 (08-30-20 @ 16:50)  HR: 62 (08-31-20 @ 05:05) (57 - 72)  BP: 114/69 (08-31-20 @ 05:05) (114/69 - 156/81)  RR: 18 (08-31-20 @ 05:05)  SpO2: 94% (08-31-20 @ 05:05)  Wt(kg): --  I&O's Detail    30 Aug 2020 07:01  -  31 Aug 2020 07:00  --------------------------------------------------------  IN:    Oral Fluid: 600 mL  Total IN: 600 mL    OUT:  Total OUT: 0 mL    Total NET: 600 mL            PHYSICAL EXAM:  General: No distress  Respiratory: b/l air entry  Cardiovascular: S1 S2  Gastrointestinal: soft  Extremities: no edema        LABORATORY:                        12.9   8.34  )-----------( 207      ( 30 Aug 2020 05:59 )             39.0     08-30    140  |  105  |  22  ----------------------------<  88  3.7   |  32<H>  |  1.20    Ca    9.1      30 Aug 2020 05:59    TPro  7.1  /  Alb  3.1<L>  /  TBili  0.5  /  DBili  x   /  AST  15  /  ALT  15  /  AlkPhos  110  08-30    Sodium, Serum: 140 mmol/L (08-30 @ 05:59)    Potassium, Serum: 3.7 mmol/L (08-30 @ 05:59)    Hemoglobin: 12.9 g/dL (08-30 @ 05:59)  Hemoglobin: 12.9 g/dL (08-29 @ 06:26)    Creatinine, Serum 1.20 (08-30 @ 05:59)  Creatinine, Serum 1.30 (08-29 @ 06:26)        LIVER FUNCTIONS - ( 30 Aug 2020 05:59 )  Alb: 3.1 g/dL / Pro: 7.1 g/dL / ALK PHOS: 110 U/L / ALT: 15 U/L / AST: 15 U/L / GGT: x

## 2020-08-31 NOTE — PROGRESS NOTE ADULT - SUBJECTIVE AND OBJECTIVE BOX
infectious diseases progress note:    LUTHER NORIEGA is a 54y y. o. Male patient    No concerning overnight events    Allergies    No Known Allergies    Intolerances        ANTIBIOTICS/RELEVANT:  antimicrobials  cefTRIAXone   IVPB 2000 milliGRAM(s) IV Intermittent every 24 hours  trimethoprim   80 mG/sulfamethoxazole 400 mG 1 Tablet(s) Oral daily    immunologic:  azaTHIOprine 100 milliGRAM(s) Oral daily  tacrolimus 1.5 milliGRAM(s) Oral two times a day    OTHER:  aspirin 325 milliGRAM(s) Oral daily  atorvastatin 10 milliGRAM(s) Oral at bedtime  cloNIDine 0.1 milliGRAM(s) Oral every 12 hours  dextrose 40% Gel 15 Gram(s) Oral once PRN  dextrose 40% Gel 15 Gram(s) Oral once PRN  dextrose 5%. 1000 milliLiter(s) IV Continuous <Continuous>  dextrose 5%. 1000 milliLiter(s) IV Continuous <Continuous>  dextrose 50% Injectable 12.5 Gram(s) IV Push once  dextrose 50% Injectable 25 Gram(s) IV Push once  dextrose 50% Injectable 25 Gram(s) IV Push once  dextrose 50% Injectable 12.5 Gram(s) IV Push once  dextrose 50% Injectable 25 Gram(s) IV Push once  dextrose 50% Injectable 25 Gram(s) IV Push once  famotidine    Tablet 20 milliGRAM(s) Oral daily  gabapentin 300 milliGRAM(s) Oral two times a day  glucagon  Injectable 1 milliGRAM(s) IntraMuscular once PRN  glucagon  Injectable 1 milliGRAM(s) IntraMuscular once PRN  heparin   Injectable 5000 Unit(s) SubCutaneous every 8 hours  hydrALAZINE 25 milliGRAM(s) Oral every 8 hours  insulin lispro (HumaLOG) Pump 1 Each SubCutaneous Continuous Pump  losartan 50 milliGRAM(s) Oral daily  metoprolol tartrate 75 milliGRAM(s) Oral two times a day      Objective:  Vital Signs Last 24 Hrs  T(C): 36.6 (31 Aug 2020 05:05), Max: 37.1 (30 Aug 2020 16:50)  T(F): 97.8 (31 Aug 2020 05:05), Max: 98.8 (30 Aug 2020 16:50)  HR: 62 (31 Aug 2020 05:05) (60 - 71)  BP: 114/69 (31 Aug 2020 05:05) (114/69 - 144/79)  BP(mean): --  RR: 18 (31 Aug 2020 05:05) (18 - 18)  SpO2: 94% (31 Aug 2020 05:05) (94% - 95%)    T(C): 36.6 (08-31-20 @ 05:05), Max: 37.1 (08-30-20 @ 16:50)  T(C): 36.6 (08-31-20 @ 05:05), Max: 37.2 (08-28-20 @ 20:23)  T(C): 36.6 (08-31-20 @ 05:05), Max: 37.2 (08-28-20 @ 20:23)    PHYSICAL EXAM:  HEENT: NC atraumatic  Neck: supple  Respiratory: no accessory muscle use, breathing comfortably  Cardiovascular: distant  Gastrointestinal: normal appearing, nondistended  Extremities: no clubbing, no cyanosis, both feet wrapped      LABS:                          12.9   8.34  )-----------( 207      ( 30 Aug 2020 05:59 )             39.0       8.34 08-30 @ 05:59  7.28 08-29 @ 06:26  6.76 08-28 @ 07:50  7.02 08-27 @ 08:47  7.44 08-26 @ 10:34      08-30    140  |  105  |  22  ----------------------------<  88  3.7   |  32<H>  |  1.20    Ca    9.1      30 Aug 2020 05:59    TPro  7.1  /  Alb  3.1<L>  /  TBili  0.5  /  DBili  x   /  AST  15  /  ALT  15  /  AlkPhos  110  08-30      Creatinine, Serum: 1.20 mg/dL (08-30-20 @ 05:59)  Creatinine, Serum: 1.30 mg/dL (08-29-20 @ 06:26)  Creatinine, Serum: 1.10 mg/dL (08-28-20 @ 07:50)  Creatinine, Serum: 1.20 mg/dL (08-27-20 @ 08:47)  Creatinine, Serum: 1.40 mg/dL (08-26-20 @ 10:34)                INFLAMMATORY MARKERS  Auto Neutrophil #: 5.84 K/uL (08-30-20 @ 05:59)  Auto Lymphocyte #: 1.32 K/uL (08-30-20 @ 05:59)  Auto Neutrophil #: 4.50 K/uL (08-29-20 @ 06:26)  Auto Lymphocyte #: 1.60 K/uL (08-29-20 @ 06:26)  Auto Neutrophil #: 4.67 K/uL (08-28-20 @ 07:50)  Auto Lymphocyte #: 1.19 K/uL (08-28-20 @ 07:50)  Auto Neutrophil #: 4.67 K/uL (08-27-20 @ 08:47)  Auto Lymphocyte #: 1.24 K/uL (08-27-20 @ 08:47)  Auto Neutrophil #: 5.47 K/uL (08-26-20 @ 10:34)  Auto Lymphocyte #: 1.06 K/uL (08-26-20 @ 10:34)      Auto Eosinophil #: 0.37 K/uL (08-30-20 @ 05:59)  Auto Eosinophil #: 0.37 K/uL (08-29-20 @ 06:26)  Auto Eosinophil #: 0.30 K/uL (08-28-20 @ 07:50)  Auto Eosinophil #: 0.40 K/uL (08-27-20 @ 08:47)  Auto Eosinophil #: 0.32 K/uL (08-26-20 @ 10:34)      Sedimentation Rate, Erythrocyte: 25 mm/hr (08-26-20 @ 10:34)                            MICROBIOLOGY:              RADIOLOGY & ADDITIONAL STUDIES:

## 2020-08-31 NOTE — PROGRESS NOTE ADULT - PROBLEM SELECTOR PLAN 7
chronic, initial BP wnl  -continue home clonidine, and metoprolol  -losartan increased to 50 mg qd as per cardio - monitor renal indices chronic, initial BP wnl  -continue home clonidine, hydralazine, and metoprolol  -losartan increased to 50 mg qd as per cardio - monitor renal indices

## 2020-08-31 NOTE — PROGRESS NOTE ADULT - ASSESSMENT
Patient is s/p left foot partial 3rd digit amputation and right foot partial 2nd digit amputation (DOS: 8/27/2020) secondary to osteomyelitis.     Patient examined and evaluated.  Surgical site dressed with adaptic and dry, sterile dressing.  Patient is to be weight bearing as tolerated to bilateral lower extremity .  Surgical pathology - Clean margins for left middle phalanx and head of proximal phalanx- negative for osteomyelitis; right middle phalanx negative for osteomyelitis   Bone culture left distal 3rd digit - positive for coag negative staph  Recommend antibiotics per medicine or ID     Wound Care Instructions:  -Keep dressing clean, dry, and intact to the left/right foot  -Do not change dressing to bilateral foot wounds   -Patient to be weight bearing as tolerated in surgical shoe   -Monitor for any signs of infection including redness, swelling in the leg above the bandage, nausea/vomiting/fever/chills/chest pain/shortness of breath, if any are present patient must report to the emergency department immediately  -Patient is to follow up with Dr. Araiza/Dr. Catherine within 5 days after discharge at [facility] [place]

## 2020-08-31 NOTE — PROGRESS NOTE ADULT - PROBLEM SELECTOR PLAN 4
s/p right renal transplant for CKD (2013)  -continue home azathioprine and tacrolimus, bactrim   -Renal Dr Bryant follow up  -Daily BMP s/p right renal transplant for CKD (2013)  -continue home azathioprine and tacrolimus, bactrim   -Renal Dr Bryant following  -Daily BMP

## 2020-08-31 NOTE — PROGRESS NOTE ADULT - REASON FOR ADMISSION
r/o osteomyelitis

## 2020-08-31 NOTE — PROGRESS NOTE ADULT - PROBLEM SELECTOR PROBLEM 5
Diabetes mellitus
Type 1 diabetes mellitus with stage 5 chronic kidney disease not on chronic dialysis
Diabetes mellitus

## 2020-08-31 NOTE — PROGRESS NOTE ADULT - SUBJECTIVE AND OBJECTIVE BOX
54y    Male    Patient is a 54y old  Male who presents with a chief complaint of r/o osteomyelitis (31 Aug 2020 10:04)      Type 1 with insulin pump basal rates is 3units/hr for 24hours,  never set up pump to include  ISF or  insulin: Carb,  was seen by Dr Gray this AM temporary basal set at  70%  = 2.1 units/hr  until   11 30AM   became huypoglycemic due to full  dose 3 units/hour.  Hx ASCVD, CKD, HF    HPI:  55 yo M with PMHx of LDRT / right renal transplant for CKD (), T1DM on insulin pump, HTN, HLD, [place] CVA () - residual deficits of loss of pain and temperature sensation in left arm and right face, hx of DVT, squamous cell skin cancer in left ear, s/p L 2nd digit partial amputation in  presenting with b/l diabetic foot ulcers with osteomyelitis to the L 3rd digit and possible osteo to the R 2nd digit. Noticed the wounds to the distal L 3rd digit about 2 months ago. Patient has been applying collagenase but showed no improvement. Has been seeing a podiatrist outpatient but wounds are not healing so was sent to wound care for additional management. Sent in by Dr. Araiza today to r/o osteomyelitis. & Possible surgery/amputation of left foot 3rd toe distal digit & Rt foot 2nd toe distal digit . Patient also has a burn rash on his chest that he acquired while cooking with oil 3 weeks ago. Scheduled for MRI of left foot but patient states he is unable to have MRI due to having metal coils in his left inner ear. Denies fevers, chills, SOB, chest pain, pain in distal digital wounds.    PAST MEDICAL & SURGICAL HISTORY:  History of DVT (deep vein thrombosis)  Squamous cell carcinoma of skin of left ear  CVA (cerebral vascular accident)  Obesity (BMI 30-39.9)  Diabetic peripheral neuropathy  CKD (chronic kidney disease)  Diabetes mellitus type 1  Hyperlipidemia  Hypertension  S/P kidney transplant  End-stage renal failure with renal transplant: 2013  H/O foot surgery:  - right foot - bone fracture - s/p ORIF and subsequent removal of hardware  Vasectomy status: s/p b/l  vasectomy  Ear disease: Left inner ear surgery  Toe infection:  L 4th Toe surgery-amputation secondary to osteomyelitis      MEDICATIONS  (STANDING):  aspirin 325 milliGRAM(s) Oral daily  atorvastatin 10 milliGRAM(s) Oral at bedtime  azaTHIOprine 100 milliGRAM(s) Oral daily  cefTRIAXone   IVPB 2000 milliGRAM(s) IV Intermittent every 24 hours  cloNIDine 0.1 milliGRAM(s) Oral every 12 hours  dextrose 5%. 1000 milliLiter(s) (50 mL/Hr) IV Continuous <Continuous>  dextrose 5%. 1000 milliLiter(s) (50 mL/Hr) IV Continuous <Continuous>  dextrose 50% Injectable 12.5 Gram(s) IV Push once  dextrose 50% Injectable 25 Gram(s) IV Push once  dextrose 50% Injectable 25 Gram(s) IV Push once  dextrose 50% Injectable 12.5 Gram(s) IV Push once  dextrose 50% Injectable 25 Gram(s) IV Push once  dextrose 50% Injectable 25 Gram(s) IV Push once  famotidine    Tablet 20 milliGRAM(s) Oral daily  gabapentin 300 milliGRAM(s) Oral two times a day  heparin   Injectable 5000 Unit(s) SubCutaneous every 8 hours  hydrALAZINE 25 milliGRAM(s) Oral every 8 hours  insulin lispro (HumaLOG) Pump 1 Each SubCutaneous Continuous Pump  losartan 50 milliGRAM(s) Oral daily  metoprolol tartrate 75 milliGRAM(s) Oral two times a day  tacrolimus 1.5 milliGRAM(s) Oral two times a day  trimethoprim   80 mG/sulfamethoxazole 400 mG 1 Tablet(s) Oral daily    Allergies    No Known Allergies    Intolerances        Daily     Daily Weight in k (31 Aug 2020 05:05)    Vital Signs Last 24 Hrs  T(C): 36.6 (31 Aug 2020 05:05), Max: 37.1 (30 Aug 2020 16:50)  T(F): 97.8 (31 Aug 2020 05:05), Max: 98.8 (30 Aug 2020 16:50)  HR: 62 (31 Aug 2020 05:05) (60 - 71)  BP: 114/69 (31 Aug 2020 05:05) (114/69 - 144/79)  BP(mean): --  RR: 18 (31 Aug 2020 05:05) (18 - 18)  SpO2: 94% (31 Aug 2020 05:05) (94% - 95%)    08-    142  |  107  |  18  ----------------------------<  43<LL>  3.8   |  30  |  1.10    Ca    9.0      31 Aug 2020 09:59    TPro  7.3  /  Alb  3.3  /  TBili  0.4  /  DBili  x   /  AST  18  /  ALT  15  /  AlkPhos  109             Anion Gap, Serum: 5 mmol/L (20 @ 09:59)  eGFR if Non African American: 76 mL/min/1.73M2 (20 @ 09:59)  eGFR if : 88 mL/min/1.73M2 (20 @ 09:59)  Anion Gap, Serum: 3 mmol/L <L> (20 @ 05:59)  eGFR if Non : 69 mL/min/1.73M2 (20 @ 05:59)  eGFR if African American: 80 mL/min/1.73M2 (20 @ 05:59)      CAPILLARY BLOOD GLUCOSE      POCT Blood Glucose.: 116 mg/dL (31 Aug 2020 08:44)  POCT Blood Glucose.: 65 mg/dL (31 Aug 2020 08:13)  POCT Blood Glucose.: 56 mg/dL (31 Aug 2020 07:51)  POCT Blood Glucose.: 59 mg/dL (31 Aug 2020 07:48)  POCT Blood Glucose.: 187 mg/dL (30 Aug 2020 21:14)  POCT Blood Glucose.: 151 mg/dL (30 Aug 2020 16:30)  POCT Blood Glucose.: 140 mg/dL (30 Aug 2020 12:04)      DIET: CC

## 2020-08-31 NOTE — PROGRESS NOTE ADULT - SUBJECTIVE AND OBJECTIVE BOX
Patient is a 54y year old Male seen at [facility] 1EAS 104 W1 for s/p left foot partial 3rd digit amputation and right foot partial 2nd digit amputation (DOS: 8/27/2020) secondary to osteomyelitis. Patient was resting in bed chair comfortably and was in NAD. Patient states he has been walking a few steps and is bearing weight on his heels and midfoot. Patient denies any pain to his both his feet.  Patient denies any fever, chills, nausea, vomiting, chest pain, shortness of breath, or calf pain at this time.      Allergies    No Known Allergies    Intolerances        MEDICATIONS  (STANDING):  aspirin 325 milliGRAM(s) Oral daily  atorvastatin 10 milliGRAM(s) Oral at bedtime  azaTHIOprine 100 milliGRAM(s) Oral daily  cefTRIAXone   IVPB 2000 milliGRAM(s) IV Intermittent every 24 hours  cloNIDine 0.1 milliGRAM(s) Oral every 12 hours  dextrose 5%. 1000 milliLiter(s) (50 mL/Hr) IV Continuous <Continuous>  dextrose 5%. 1000 milliLiter(s) (50 mL/Hr) IV Continuous <Continuous>  dextrose 50% Injectable 12.5 Gram(s) IV Push once  dextrose 50% Injectable 25 Gram(s) IV Push once  dextrose 50% Injectable 25 Gram(s) IV Push once  dextrose 50% Injectable 12.5 Gram(s) IV Push once  dextrose 50% Injectable 25 Gram(s) IV Push once  dextrose 50% Injectable 25 Gram(s) IV Push once  famotidine    Tablet 20 milliGRAM(s) Oral daily  gabapentin 300 milliGRAM(s) Oral two times a day  heparin   Injectable 5000 Unit(s) SubCutaneous every 8 hours  hydrALAZINE 25 milliGRAM(s) Oral every 8 hours  insulin lispro (HumaLOG) Pump 1 Each SubCutaneous Continuous Pump  losartan 50 milliGRAM(s) Oral daily  metoprolol tartrate 75 milliGRAM(s) Oral two times a day  tacrolimus 1.5 milliGRAM(s) Oral two times a day  trimethoprim   80 mG/sulfamethoxazole 400 mG 1 Tablet(s) Oral daily    MEDICATIONS  (PRN):  dextrose 40% Gel 15 Gram(s) Oral once PRN Blood Glucose LESS THAN 70 milliGRAM(s)/deciliter  dextrose 40% Gel 15 Gram(s) Oral once PRN Blood Glucose LESS THAN 70 milliGRAM(s)/deciliter  glucagon  Injectable 1 milliGRAM(s) IntraMuscular once PRN Glucose LESS THAN 70 milligrams/deciliter  glucagon  Injectable 1 milliGRAM(s) IntraMuscular once PRN Glucose LESS THAN 70 milligrams/deciliter      Vital Signs Last 24 Hrs  T(C): 36.6 (31 Aug 2020 05:05), Max: 37.1 (30 Aug 2020 16:50)  T(F): 97.8 (31 Aug 2020 05:05), Max: 98.8 (30 Aug 2020 16:50)  HR: 62 (31 Aug 2020 05:05) (60 - 71)  BP: 114/69 (31 Aug 2020 05:05) (114/69 - 144/79)  BP(mean): --  RR: 18 (31 Aug 2020 05:05) (18 - 18)  SpO2: 94% (31 Aug 2020 05:05) (94% - 95%)    PHYSICAL EXAM:  Vascular: DP & PT palpable bilaterally, Capillary refill 3 seconds, Digital hair absent bilaterally  Neurological: Light touch sensation diminished to the level of the lower third of the leg bilaterally   Musculoskeletal: 5/5 strength in all quadrants bilaterally, AJ & STJ ROM intact. Absence of left 4th digit and partial left 2nd digit secondary to amputation for bone infection   Dermatological: Skin of bilateral lower extremity is warm to touch. Sutures intact to bilateral surgical sites with no dehiscence, mild serosanguinous drainage, mild erythema noted to the right 2nd digit, no fluctuance, no proximal streaking.      CBC Full  -  ( 31 Aug 2020 09:59 )  WBC Count : 7.21 K/uL  RBC Count : 4.67 M/uL  Hemoglobin : 13.4 g/dL  Hematocrit : 40.8 %  Platelet Count - Automated : 215 K/uL  Mean Cell Volume : 87.4 fl  Mean Cell Hemoglobin : 28.7 pg  Mean Cell Hemoglobin Concentration : 32.8 gm/dL  Auto Neutrophil # : 4.79 K/uL  Auto Lymphocyte # : 1.33 K/uL  Auto Monocyte # : 0.63 K/uL  Auto Eosinophil # : 0.36 K/uL  Auto Basophil # : 0.08 K/uL  Auto Neutrophil % : 66.5 %  Auto Lymphocyte % : 18.4 %  Auto Monocyte % : 8.7 %  Auto Eosinophil % : 5.0 %  Auto Basophil % : 1.1 %    08-31    142  |  107  |  18  ----------------------------<  43<LL>  3.8   |  30  |  1.10    Ca    9.0      31 Aug 2020 09:59    TPro  7.3  /  Alb  3.3  /  TBili  0.4  /  DBili  x   /  AST  18  /  ALT  15  /  AlkPhos  109  08-31      Culture - Tissue with Gram Stain (08.27.20 @ 22:01)    -  Ampicillin/Sulbactam: R <=8/4    -  Cefazolin: R <=4    -  Clindamycin: S 0.5    -  Erythromycin: R >4    -  Gentamicin: S <=1 Should not be used as monotherapy    -  Oxacillin: R >2    -  RIF- Rifampin: S <=1 Should not be used as monotherapy    -  Tetra/Doxy: S 2    -  Trimethoprim/Sulfamethoxazole: R >2/38    -  Vancomycin: S 2    Gram Stain:   No polymorphonuclear cells seen per low power field  No organisms seen per oil power field    -  Penicillin: R >8    Specimen Source: .Tissue Other, distal left 3rd toe    Culture Results:   Rare Coag Negative Staphylococcus    Organism Identification: Coag Negative Staphylococcus    Organism: Coag Negative Staphylococcus    Method Type: [facility]    Surgical Pathology Report (08.27.20 @ 13:30)    Surgical Pathology Report:   Surgical Final Report    Final Diagnosis    1. Distal right second toe, amputation:  -Focal ulceration with granulation tissue formation and  necrotizing  inflammatory exudate.  -Underlying bone with focal active bone remodeling and new  bone formation  with marrow fibrosis, consistent with reactive changes,  negative for  osteomyelitis.  -Intact skin demonstrating secondary epidermal changes  including pseudo-  epitheliomatous hyperplasia, hyperkeratosis and  parakeratosis.  -Viable skin and soft tissue resection margin with  secondary epidermal changes,  negative for significant inflammatory activity.  -Viable specimen bone resection margin, negative for  osteomyelitis.    2. Bone, distal right second middle phalanx, excision:  -Articular bone and adjacent soft tissue with reactive and  degenerative changes.  -No evidence of osteomyelitis.    3. Distal left third toe, amputation:  -Ulceration with granulation tissue formation and necrosis,  and necrotizing  inflammatory exudate.  -Underlying bone with acute osteomyelitis and inflammatory  exudate,  accompanied by active bone remodeling with new bone  formation and marrow  granulation tissue, extending to inked margin.  -Intact skin demonstrating extensive granulation tissue  formation and focal  necrosis, accompanied by secondary epidermal changes  including pseudo-  epitheliomatous hyperplasia, hyperkeratosis and  parakeratosis.  -Secondary epidermal changes and granulation tissue extend  to inked specimen  skin and soft tissue margin.  -Detached portion of articular bone and adjacent soft  tissue demonstrating mild  acute osteomyelitis accompanied by active bone remodeling  with new bone formation and marrow granulation tissue.    4. Bone, distal left third middle phalanx, excision:  -Articular bone and adjacent soft tissue with reactive and  degenerative changes.  -No evidence of osteomyelitis.    5. Bone, third toe left foot, head of proximal phalanx, excision:  -Articular bone and adjacent soft tissue with reactive and  degenerative changes.  -No evidence of osteomyelitis.    Verified by: TRACEY DEL ROSARIO  (Electronic Signature)  Reported on: 08/31/20 11:35 EDT, [facility], [address] [place] [address]  Phone: [phone]   Fax: [phone]  _________________________________________________________________    Clinical History  Left 3rd digit and right 2nd digit infection with osteomyelitis  Procedure: Partial amputation of left 3rd digit and right 2nd  digit    Specimen(s) Submitted  1- Distal right 2nd toe  2- Distal right 2nd middle phalanx  3- Distal left 3rd toe  4- Distal left 3rd toe  5- Head of proximal phalynx 3rd toe left foot    Gross Description  1. The specimen is received in formalin and the specimen  container is labeled: Distal right 2nd toe. It consists of an  amputated distal portion ofdigit with smooth skin and bone  margins of resection measuring 2.0 x 1.7 x 1.3 cm. The nail shows  tan-yellow discoloration. The distal tip shows a 1.5 x 1.0 cm  tan-yellow ulcerated lesion, abutting the skin margin. The bone  cut surface is yellow and spongy. The specimen is entirely  submitted following selective decalcification. Five cassettes.  A - bone margin  B-E - full cross sections of digit    2. The specimen is received in formalin and the specimen  container is labeled: Distal right 2nd middle phalanx. It  consists of a piece of tan-yellow bony tissue measuring 1.0 x 0.5  x 0.4 cm. The bone cut surface is yellow and spongy. Entirely  submitted following decalcification. One cassette.    3. The specimen is received in formalin and the specimen  container is labeled: Distal left 3rd toe. It consists of an  amputated distal portion of digit with a smooth skin margin of  resection measuring 2.0x 1.5 x 1.5 cm. The nail shows tan-yellow  discoloration. The distal tip shows a 1.5 x 1.0 cm tan-yellow  ulcerated lesion, abutting the skin margin. Separatey received is  a piece of tan-yellow bony tissue measuring 1.5 x 1.2 x 0.5 cm,  probably representing bone margin.   The specimen is entirely  submitted following selective decalcification. Five cassettes.  A - separately received bone  B-E - full cross sections of digit    4. The specimen is received in formalin and the specimen  container is labeled: Distal left 3rd middle phalanx. It consists  of two irregular pieces of tan-brown hemorrhagic bony tissue  measuring in aggregate 2.2 x 1.5 x 0.4 cm. The bone is cut and  decalcified prior to submission. Entirely submitted following  decalcification. Two cassettes (one piece of bone per cassette).    5. The specimen is received in formalin and the specimen  container is labeled: Head of proximal phalanx 3rd toe left foot.  It consists of a piece of tan-yellow bony tissue measuring 1.0 x  0.7 x 0.4 cm. The bone cut surface is yellow and spongy. Entirely  submitted following decalcification. One cassette.    In addition to other data that may appear on the specimen  containers, all labels have been inspected to confirm the  presence of the patient's name and date of birth.  WILL Nicole ([facility]) 8/30/2020 11:37 AM    Perioperative Diagnosis  Left 3rd digit and right 2nd digit infection with osteomyelitis    Postoperative Diagnosis  Same        Imaging: ----------  < from: Xray Foot AP + Lateral + Oblique, Bilat (08.27.20 @ 14:50) >  EXAM:  FOOT BILATERAL (MINIMUM 3 V)                            PROCEDURE DATE:  08/27/2020          INTERPRETATION:  Bilateral feet    HISTORY: Postop    COMPARISON: 8/26/2020     Three views of the right foot show interval amputation of the distalhalf of the second distal phalanx.    Three views of the left foot show interval amputation of the third toe at the level of the middle of the third digit proximal phalanx.    IMPRESSION: Postoperative changes.    Thank you for this referral.      < end of copied text >

## 2020-09-01 DIAGNOSIS — E11.621 TYPE 2 DIABETES MELLITUS WITH FOOT ULCER: ICD-10-CM

## 2020-09-01 LAB
CULTURE RESULTS: SIGNIFICANT CHANGE UP
CULTURE RESULTS: SIGNIFICANT CHANGE UP
ORGANISM # SPEC MICROSCOPIC CNT: SIGNIFICANT CHANGE UP
ORGANISM # SPEC MICROSCOPIC CNT: SIGNIFICANT CHANGE UP
SPECIMEN SOURCE: SIGNIFICANT CHANGE UP
SPECIMEN SOURCE: SIGNIFICANT CHANGE UP

## 2020-09-03 ENCOUNTER — APPOINTMENT (OUTPATIENT)
Dept: PODIATRY | Facility: HOSPITAL | Age: 55
End: 2020-09-03
Payer: MEDICARE

## 2020-09-03 ENCOUNTER — OUTPATIENT (OUTPATIENT)
Dept: OUTPATIENT SERVICES | Facility: HOSPITAL | Age: 55
LOS: 1 days | Discharge: HOME | End: 2020-09-03
Payer: MEDICARE

## 2020-09-03 VITALS
HEIGHT: 73 IN | HEART RATE: 61 BPM | WEIGHT: 315 LBS | TEMPERATURE: 97.7 F | OXYGEN SATURATION: 95 % | BODY MASS INDEX: 41.75 KG/M2 | RESPIRATION RATE: 20 BRPM | SYSTOLIC BLOOD PRESSURE: 140 MMHG | DIASTOLIC BLOOD PRESSURE: 83 MMHG

## 2020-09-03 DIAGNOSIS — N18.6 END STAGE RENAL DISEASE: Chronic | ICD-10-CM

## 2020-09-03 DIAGNOSIS — Z94.0 KIDNEY TRANSPLANT STATUS: Chronic | ICD-10-CM

## 2020-09-03 DIAGNOSIS — E11.621 TYPE 2 DIABETES MELLITUS WITH FOOT ULCER: ICD-10-CM

## 2020-09-03 PROCEDURE — 99024 POSTOP FOLLOW-UP VISIT: CPT

## 2020-09-03 PROCEDURE — G0463: CPT

## 2020-09-03 NOTE — REVIEW OF SYSTEMS
[Fever] : no fever [Chills] : no chills [Eye Pain] : no eye pain [Loss Of Hearing] : no hearing loss [Shortness Of Breath] : no shortness of breath [Abdominal Pain] : no abdominal pain [Joint Stiffness] : joint stiffness [Anxiety] : no anxiety [Easy Bleeding] : no tendency for easy bleeding [FreeTextEntry5] : HTN , HLD , diabetic cardio vascular disease  [FreeTextEntry9] : Patient has deformities of the forefoot bilateral , hammer toes and previous amputations for OM  [FreeTextEntry8] : Kidney transplant  [de-identified] : s/p distal amp 2nd toe right and 3rd left , healing well , no soi  [de-identified] : Diabetic neuropathy , IDDM  [de-identified] : IDDM , kidney transplant

## 2020-09-03 NOTE — ASSESSMENT
[Demo] : Demo [Verbal] : Verbal [Written] : Written [Family member] : Family member [Patient] : Patient [Good - alert, interested, motivated] : Good - alert, interested, motivated [Verbalizes knowledge/Understanding] : Verbalizes knowledge/understanding [Dressing changes] : dressing changes [Foot Care] : foot care [Signs and symptoms of infection] : sign and symptoms of infection [Skin Care] : skin care [Nutrition] : nutrition [How and When to Call] : how and when to call [Pain Management] : pain management [Off-loading] : off-loading [Compression Therapy] : compression therapy [Patient responsibility to plan of care] : patient responsibility to plan of care [Glycemic Control] : glycemic control [Stable] : stable [Ambulatory] : Ambulatory [Home] : Home [Not Applicable - Long Term Care/Home Health Agency] : Long Term Care/Home Health Agency: Not Applicable [] : No [FreeTextEntry2] : Infection prevention\par Localized wound care \par Goal of remaining pain free regarding wounds.\par Offloading  [FreeTextEntry4] : Patient to continue taking PO Bactrim \par Follow up in 1 week

## 2020-09-03 NOTE — PHYSICAL EXAM
[4 x 4] : 4 x 4  [Abdominal Pad] : Abdominal Pad [Normal Breath Sounds] : Normal breath sounds [1+] : left 1+ [Ankle Swelling On The Right] : mild [Varicose Veins Of Lower Extremities] : bilaterally [Petechiae] : no petechiae [Purpura] : no purpura  [Skin Ulcer] : ulcer [Alert] : alert [Oriented to Place] : oriented to place [Oriented to Person] : oriented to person [Oriented to Time] : oriented to time [Calm] : calm [de-identified] : calm  [de-identified] : HTN, HLD [de-identified] : Hammer toe deformities  [de-identified] : s/p distal amp 2nd toe right and left 3rd toe , healing well  [de-identified] : IDDM with neuropathy  [de-identified] : No neurovascular deficits noted.\par  [FreeTextEntry1] : Right foot 2nd digit distal amp site - Sutures intact  [de-identified] : Scant Serous/sanguinous [de-identified] : Adaptic touch  [de-identified] : Expressed comfort post ACE application. [de-identified] : Cleansed with NS\par Kerlix\par \par *** Dressing applied by DPM resident  [de-identified] : No neurovascular deficits noted.\par  [FreeTextEntry7] : Left foot 3rd toe - Sutures intact.  [de-identified] : Scant Serous/sanguinous [de-identified] : Adaptic touch  [de-identified] : Cleansed with NS\par Kerlix \par \par \par *** Dressing applied by DPM resident  [de-identified] : Normal [de-identified] : None [de-identified] : No [de-identified] : None [de-identified] : Weekly [de-identified] : Ace wraps [de-identified] : Normal [de-identified] : Primary Dressing [de-identified] : None [de-identified] : None [de-identified] : Ace wraps [de-identified] : No [de-identified] : Weekly [de-identified] : Primary Dressing

## 2020-09-07 DIAGNOSIS — Z79.82 LONG TERM (CURRENT) USE OF ASPIRIN: ICD-10-CM

## 2020-09-07 DIAGNOSIS — E11.621 TYPE 2 DIABETES MELLITUS WITH FOOT ULCER: ICD-10-CM

## 2020-09-07 DIAGNOSIS — Z80.8 FAMILY HISTORY OF MALIGNANT NEOPLASM OF OTHER ORGANS OR SYSTEMS: ICD-10-CM

## 2020-09-07 DIAGNOSIS — Z79.4 LONG TERM (CURRENT) USE OF INSULIN: ICD-10-CM

## 2020-09-07 DIAGNOSIS — Z79.899 OTHER LONG TERM (CURRENT) DRUG THERAPY: ICD-10-CM

## 2020-09-07 DIAGNOSIS — Z89.422 ACQUIRED ABSENCE OF OTHER LEFT TOE(S): ICD-10-CM

## 2020-09-07 DIAGNOSIS — Z86.73 PERSONAL HISTORY OF TRANSIENT ISCHEMIC ATTACK (TIA), AND CEREBRAL INFARCTION WITHOUT RESIDUAL DEFICITS: ICD-10-CM

## 2020-09-07 DIAGNOSIS — Z86.718 PERSONAL HISTORY OF OTHER VENOUS THROMBOSIS AND EMBOLISM: ICD-10-CM

## 2020-09-07 DIAGNOSIS — Z85.828 PERSONAL HISTORY OF OTHER MALIGNANT NEOPLASM OF SKIN: ICD-10-CM

## 2020-09-07 DIAGNOSIS — Z83.3 FAMILY HISTORY OF DIABETES MELLITUS: ICD-10-CM

## 2020-09-07 DIAGNOSIS — L97.512 NON-PRESSURE CHRONIC ULCER OF OTHER PART OF RIGHT FOOT WITH FAT LAYER EXPOSED: ICD-10-CM

## 2020-09-07 DIAGNOSIS — N18.3 CHRONIC KIDNEY DISEASE, STAGE 3 (MODERATE): ICD-10-CM

## 2020-09-07 DIAGNOSIS — Z80.3 FAMILY HISTORY OF MALIGNANT NEOPLASM OF BREAST: ICD-10-CM

## 2020-09-07 DIAGNOSIS — E78.5 HYPERLIPIDEMIA, UNSPECIFIED: ICD-10-CM

## 2020-09-07 DIAGNOSIS — E11.22 TYPE 2 DIABETES MELLITUS WITH DIABETIC CHRONIC KIDNEY DISEASE: ICD-10-CM

## 2020-09-07 DIAGNOSIS — E11.40 TYPE 2 DIABETES MELLITUS WITH DIABETIC NEUROPATHY, UNSPECIFIED: ICD-10-CM

## 2020-09-07 DIAGNOSIS — Z94.0 KIDNEY TRANSPLANT STATUS: ICD-10-CM

## 2020-09-07 DIAGNOSIS — G47.33 OBSTRUCTIVE SLEEP APNEA (ADULT) (PEDIATRIC): ICD-10-CM

## 2020-09-07 DIAGNOSIS — Z87.81 PERSONAL HISTORY OF (HEALED) TRAUMATIC FRACTURE: ICD-10-CM

## 2020-09-07 DIAGNOSIS — L97.526 NON-PRESSURE CHRONIC ULCER OF OTHER PART OF LEFT FOOT WITH BONE INVOLVEMENT WITHOUT EVIDENCE OF NECROSIS: ICD-10-CM

## 2020-09-07 DIAGNOSIS — I13.10 HYPERTENSIVE HEART AND CHRONIC KIDNEY DISEASE WITHOUT HEART FAILURE, WITH STAGE 1 THROUGH STAGE 4 CHRONIC KIDNEY DISEASE, OR UNSPECIFIED CHRONIC KIDNEY DISEASE: ICD-10-CM

## 2020-09-11 ENCOUNTER — OUTPATIENT (OUTPATIENT)
Dept: OUTPATIENT SERVICES | Facility: HOSPITAL | Age: 55
LOS: 1 days | Discharge: HOME | End: 2020-09-11
Payer: MEDICARE

## 2020-09-11 ENCOUNTER — APPOINTMENT (OUTPATIENT)
Dept: PODIATRY | Facility: HOSPITAL | Age: 55
End: 2020-09-11
Payer: MEDICARE

## 2020-09-11 VITALS
WEIGHT: 315 LBS | HEART RATE: 82 BPM | TEMPERATURE: 98.2 F | OXYGEN SATURATION: 96 % | RESPIRATION RATE: 20 BRPM | DIASTOLIC BLOOD PRESSURE: 82 MMHG | BODY MASS INDEX: 41.75 KG/M2 | HEIGHT: 73 IN | SYSTOLIC BLOOD PRESSURE: 150 MMHG

## 2020-09-11 DIAGNOSIS — Z94.0 KIDNEY TRANSPLANT STATUS: Chronic | ICD-10-CM

## 2020-09-11 DIAGNOSIS — E11.621 TYPE 2 DIABETES MELLITUS WITH FOOT ULCER: ICD-10-CM

## 2020-09-11 DIAGNOSIS — N18.6 END STAGE RENAL DISEASE: Chronic | ICD-10-CM

## 2020-09-11 PROCEDURE — 99024 POSTOP FOLLOW-UP VISIT: CPT

## 2020-09-11 PROCEDURE — G0463: CPT

## 2020-09-11 NOTE — ASSESSMENT
[Verbal] : Verbal [Written] : Written [Demo] : Demo [Patient] : Patient [Spouse] : Spouse [Fair - mild discomfort, physical impairment, low acceptance] : Fair - mild discomfort, physical impairment, low acceptance [Needs reinforcement] : needs reinforcement [Dressing changes] : dressing changes [Foot Care] : foot care [Skin Care] : skin care [Signs and symptoms of infection] : sign and symptoms of infection [Nutrition] : nutrition [How and When to Call] : how and when to call [Pain Management] : pain management [Off-loading] : off-loading [Patient responsibility to plan of care] : patient responsibility to plan of care [Glycemic Control] : glycemic control [Home] : Home [Stable] : stable [Ambulatory] : Ambulatory [Not Applicable - Long Term Care/Home Health Agency] : Long Term Care/Home Health Agency: Not Applicable [] : No [FreeTextEntry2] : Infection prevention\par Localized wound care \par Goal of remaining pain free regarding wounds.\par Offloading\par Low glycemic diet  [FreeTextEntry4] : Patient to have vascular studies completed \par Follow up in 1 week  [FreeTextEntry3] : Patient presents with new wounds

## 2020-09-11 NOTE — REVIEW OF SYSTEMS
[Joint Stiffness] : joint stiffness [Fever] : no fever [Chills] : no chills [Eye Pain] : no eye pain [Loss Of Hearing] : no hearing loss [Shortness Of Breath] : no shortness of breath [Abdominal Pain] : no abdominal pain [Anxiety] : no anxiety [Easy Bleeding] : no tendency for easy bleeding [FreeTextEntry5] : HTN , HLD , diabetic cardio vascular disease  [FreeTextEntry8] : Kidney transplant  [de-identified] : DFU 2 right 2nd ( ssf) and DFU 3 distal left 3rd . s/p distal amps 2 weeks post , healing well  [FreeTextEntry9] : Patient has deformities of the forefoot bilateral , hammer toes and previous amputations for OM  [de-identified] : Diabetic neuropathy , IDDM  [de-identified] : IDDM , kidney transplant

## 2020-09-11 NOTE — PLAN
[FreeTextEntry1] : all sutures removed , healed well , Toe socks applied and steristrips , continue wound care and PTR 1 week

## 2020-09-11 NOTE — HISTORY OF PRESENT ILLNESS
[FreeTextEntry1] : s/p distal amp 3rd toe left foot and 2nd toe right foot , healing well , sutures removed , no SOI

## 2020-09-11 NOTE — PHYSICAL EXAM
[4 x 4] : 4 x 4  [Normal Breath Sounds] : Normal breath sounds [1+] : left 1+ [Ankle Swelling On The Right] : mild [Varicose Veins Of Lower Extremities] : bilaterally [Oriented to Person] : oriented to person [Skin Ulcer] : ulcer [Alert] : alert [Oriented to Place] : oriented to place [Oriented to Time] : oriented to time [Calm] : calm [Purpura] : no purpura  [de-identified] : calm  [Petechiae] : no petechiae [de-identified] : Hammer toe deformities  [de-identified] : DFU 2 distal right toe and DFU distal left 3rd toe , s/p amps , healing well [de-identified] : IDDM with neuropathy  [FreeTextEntry7] : Left foot 3rd toe distal amp site - Sutures intact  [de-identified] : Dry dressing  [de-identified] : Right plantar foot - Crack in callus (New)  [de-identified] : Cleansed with NS\par Paper tape \par \par \par *** Sutures removed  [de-identified] : 0.2 [de-identified] : 0.1 [de-identified] : 0.2 [de-identified] : sanguinous [de-identified] : Bactroban  [de-identified] : Callus  [de-identified] : Cleansed with NS\par Allevyn pad\par Cloth tape \par \par *** Callus shaved AgNo3 used  [FreeTextEntry1] : Right lateral foot - Crack in callus (New)  [FreeTextEntry2] : 0.2 [de-identified] : Scant Serous/sanguinous [FreeTextEntry4] : 0.1 [FreeTextEntry3] : 0.2 [de-identified] : Cleansed with NS\par Allevyn pad \par \par *** Callus shaved AgNo3 used  [de-identified] : Bactroban  [de-identified] : callus  [TWNoteComboBox4] : Small [de-identified] : None [de-identified] : Daily [de-identified] : Secondary Dressing [de-identified] : None [de-identified] : Normal [de-identified] : None [de-identified] : None [de-identified] : Primary Dressing [de-identified] : Daily [de-identified] : No [de-identified] : Moderate [de-identified] : other [de-identified] : None [de-identified] : None [de-identified] : Daily [de-identified] : No [de-identified] : 100% [de-identified] : None [de-identified] : Primary Dressing [de-identified] : other [de-identified] : 100% [de-identified] : No [de-identified] : None [de-identified] : Daily [de-identified] : Primary Dressing

## 2020-09-13 DIAGNOSIS — Z86.718 PERSONAL HISTORY OF OTHER VENOUS THROMBOSIS AND EMBOLISM: ICD-10-CM

## 2020-09-13 DIAGNOSIS — Z94.0 KIDNEY TRANSPLANT STATUS: ICD-10-CM

## 2020-09-13 DIAGNOSIS — Z89.422 ACQUIRED ABSENCE OF OTHER LEFT TOE(S): ICD-10-CM

## 2020-09-13 DIAGNOSIS — G47.33 OBSTRUCTIVE SLEEP APNEA (ADULT) (PEDIATRIC): ICD-10-CM

## 2020-09-13 DIAGNOSIS — Z48.02 ENCOUNTER FOR REMOVAL OF SUTURES: ICD-10-CM

## 2020-09-13 DIAGNOSIS — E11.40 TYPE 2 DIABETES MELLITUS WITH DIABETIC NEUROPATHY, UNSPECIFIED: ICD-10-CM

## 2020-09-13 DIAGNOSIS — L97.526 NON-PRESSURE CHRONIC ULCER OF OTHER PART OF LEFT FOOT WITH BONE INVOLVEMENT WITHOUT EVIDENCE OF NECROSIS: ICD-10-CM

## 2020-09-13 DIAGNOSIS — Z86.73 PERSONAL HISTORY OF TRANSIENT ISCHEMIC ATTACK (TIA), AND CEREBRAL INFARCTION WITHOUT RESIDUAL DEFICITS: ICD-10-CM

## 2020-09-13 DIAGNOSIS — Z83.3 FAMILY HISTORY OF DIABETES MELLITUS: ICD-10-CM

## 2020-09-13 DIAGNOSIS — Z80.3 FAMILY HISTORY OF MALIGNANT NEOPLASM OF BREAST: ICD-10-CM

## 2020-09-13 DIAGNOSIS — Z89.421 ACQUIRED ABSENCE OF OTHER RIGHT TOE(S): ICD-10-CM

## 2020-09-13 DIAGNOSIS — Z85.828 PERSONAL HISTORY OF OTHER MALIGNANT NEOPLASM OF SKIN: ICD-10-CM

## 2020-09-13 DIAGNOSIS — L97.512 NON-PRESSURE CHRONIC ULCER OF OTHER PART OF RIGHT FOOT WITH FAT LAYER EXPOSED: ICD-10-CM

## 2020-09-13 DIAGNOSIS — Z79.82 LONG TERM (CURRENT) USE OF ASPIRIN: ICD-10-CM

## 2020-09-13 DIAGNOSIS — Z87.81 PERSONAL HISTORY OF (HEALED) TRAUMATIC FRACTURE: ICD-10-CM

## 2020-09-13 DIAGNOSIS — E11.621 TYPE 2 DIABETES MELLITUS WITH FOOT ULCER: ICD-10-CM

## 2020-09-13 DIAGNOSIS — Z79.4 LONG TERM (CURRENT) USE OF INSULIN: ICD-10-CM

## 2020-09-13 DIAGNOSIS — N18.3 CHRONIC KIDNEY DISEASE, STAGE 3 (MODERATE): ICD-10-CM

## 2020-09-13 DIAGNOSIS — E11.22 TYPE 2 DIABETES MELLITUS WITH DIABETIC CHRONIC KIDNEY DISEASE: ICD-10-CM

## 2020-09-13 DIAGNOSIS — L84 CORNS AND CALLOSITIES: ICD-10-CM

## 2020-09-13 DIAGNOSIS — Z80.8 FAMILY HISTORY OF MALIGNANT NEOPLASM OF OTHER ORGANS OR SYSTEMS: ICD-10-CM

## 2020-09-13 DIAGNOSIS — E78.5 HYPERLIPIDEMIA, UNSPECIFIED: ICD-10-CM

## 2020-09-13 DIAGNOSIS — I13.10 HYPERTENSIVE HEART AND CHRONIC KIDNEY DISEASE WITHOUT HEART FAILURE, WITH STAGE 1 THROUGH STAGE 4 CHRONIC KIDNEY DISEASE, OR UNSPECIFIED CHRONIC KIDNEY DISEASE: ICD-10-CM

## 2020-09-13 DIAGNOSIS — Z79.899 OTHER LONG TERM (CURRENT) DRUG THERAPY: ICD-10-CM

## 2020-09-17 ENCOUNTER — APPOINTMENT (OUTPATIENT)
Dept: PODIATRY | Facility: HOSPITAL | Age: 55
End: 2020-09-17
Payer: MEDICARE

## 2020-09-17 ENCOUNTER — OUTPATIENT (OUTPATIENT)
Dept: OUTPATIENT SERVICES | Facility: HOSPITAL | Age: 55
LOS: 1 days | Discharge: HOME | End: 2020-09-17
Payer: MEDICARE

## 2020-09-17 VITALS
HEART RATE: 59 BPM | BODY MASS INDEX: 41.75 KG/M2 | RESPIRATION RATE: 20 BRPM | WEIGHT: 315 LBS | SYSTOLIC BLOOD PRESSURE: 142 MMHG | HEIGHT: 73 IN | DIASTOLIC BLOOD PRESSURE: 76 MMHG | TEMPERATURE: 97.4 F | OXYGEN SATURATION: 96 %

## 2020-09-17 DIAGNOSIS — N18.6 END STAGE RENAL DISEASE: Chronic | ICD-10-CM

## 2020-09-17 DIAGNOSIS — Z94.0 KIDNEY TRANSPLANT STATUS: Chronic | ICD-10-CM

## 2020-09-17 PROCEDURE — 99024 POSTOP FOLLOW-UP VISIT: CPT

## 2020-09-17 PROCEDURE — G0463: CPT

## 2020-09-17 NOTE — PHYSICAL EXAM
[Normal Breath Sounds] : Normal breath sounds [1+] : left 1+ [Varicose Veins Of Lower Extremities] : bilaterally [Ankle Swelling On The Right] : mild [Skin Ulcer] : ulcer [Alert] : alert [Oriented to Person] : oriented to person [Oriented to Place] : oriented to place [Oriented to Time] : oriented to time [Calm] : calm [Purpura] : no purpura  [Petechiae] : no petechiae [de-identified] : calm  [de-identified] : HTN, HLD [de-identified] : Hammer toe deformities  [de-identified] : DFU 2 distal right toe and DFU distal left 3rd toe , s/p amps , healed well [de-identified] : IDDM with neuropathy  [FreeTextEntry7] : Left foot 3rd digit distal amp site - Steri strips intact  [de-identified] : No treatment  [de-identified] : Cleansed with NS\par Steri strips removed  [de-identified] : Right plantar foot - Closed no open wound.  [de-identified] : No treatment  [FreeTextEntry1] : Right lateral foot  [FreeTextEntry2] : 0.4 [FreeTextEntry3] : 0.4 [FreeTextEntry4] : 0.1 [de-identified] : Scant Serous/sanguinous [de-identified] : Bactroban  [de-identified] : Cleansed with NS\par Allevyn border\par \par *** Dressing applied by Dr. Araiza  [TWNoteComboBox4] : None [de-identified] : None [de-identified] : None [de-identified] : Normal [de-identified] : None [de-identified] : None [de-identified] : No [de-identified] : Normal [de-identified] : None [de-identified] : None [de-identified] : 100% [de-identified] : No [de-identified] : 2x Weekly [de-identified] : Primary Dressing

## 2020-09-17 NOTE — REVIEW OF SYSTEMS
[Joint Stiffness] : joint stiffness [Fever] : no fever [Chills] : no chills [Eye Pain] : no eye pain [Loss Of Hearing] : no hearing loss [Shortness Of Breath] : no shortness of breath [Abdominal Pain] : no abdominal pain [Anxiety] : no anxiety [Easy Bleeding] : no tendency for easy bleeding [FreeTextEntry5] : HTN , HLD , diabetic cardio vascular disease  [FreeTextEntry8] : Kidney transplant  [FreeTextEntry9] : Patient has deformities of the forefoot bilateral , hammer toes and previous amputations for OM  [de-identified] : DFU 2 right 2nd ( ssf) and DFU 3 distal left 3rd . s/p distal amps 2 weeks post , healed well  [de-identified] : Diabetic neuropathy , IDDM  [de-identified] : IDDM , kidney transplant

## 2020-09-17 NOTE — ASSESSMENT
[Verbal] : Verbal [Written] : Written [Demo] : Demo [Patient] : Patient [Spouse] : Spouse [Good - alert, interested, motivated] : Good - alert, interested, motivated [Verbalizes knowledge/Understanding] : Verbalizes knowledge/understanding [Dressing changes] : dressing changes [Foot Care] : foot care [Skin Care] : skin care [Signs and symptoms of infection] : sign and symptoms of infection [Venous Disease] : venous disease [Nutrition] : nutrition [How and When to Call] : how and when to call [Compression Therapy] : compression therapy [Patient responsibility to plan of care] : patient responsibility to plan of care [] : Yes [Stable] : stable [Home] : Home [Ambulatory] : Ambulatory [Not Applicable - Long Term Care/Home Health Agency] : Long Term Care/Home Health Agency: Not Applicable [FreeTextEntry2] : Infection prevention\par Localized wound care \par Goal of remaining pain free regarding wounds.\par Low glycemic diet \par Offloading \par Edema prevention  [FreeTextEntry4] : Follow u in 2 weeks

## 2020-09-17 NOTE — PLAN
[FreeTextEntry1] : continue use of bactroban and bag balm to both feet . Patient able to transition into shoe gear . PTR 2 weeks for discharge

## 2020-09-18 DIAGNOSIS — Z79.4 LONG TERM (CURRENT) USE OF INSULIN: ICD-10-CM

## 2020-09-18 DIAGNOSIS — L84 CORNS AND CALLOSITIES: ICD-10-CM

## 2020-09-18 DIAGNOSIS — Z86.718 PERSONAL HISTORY OF OTHER VENOUS THROMBOSIS AND EMBOLISM: ICD-10-CM

## 2020-09-18 DIAGNOSIS — N18.3 CHRONIC KIDNEY DISEASE, STAGE 3 (MODERATE): ICD-10-CM

## 2020-09-18 DIAGNOSIS — Z89.421 ACQUIRED ABSENCE OF OTHER RIGHT TOE(S): ICD-10-CM

## 2020-09-18 DIAGNOSIS — E78.5 HYPERLIPIDEMIA, UNSPECIFIED: ICD-10-CM

## 2020-09-18 DIAGNOSIS — G47.33 OBSTRUCTIVE SLEEP APNEA (ADULT) (PEDIATRIC): ICD-10-CM

## 2020-09-18 DIAGNOSIS — Z80.3 FAMILY HISTORY OF MALIGNANT NEOPLASM OF BREAST: ICD-10-CM

## 2020-09-18 DIAGNOSIS — E11.22 TYPE 2 DIABETES MELLITUS WITH DIABETIC CHRONIC KIDNEY DISEASE: ICD-10-CM

## 2020-09-18 DIAGNOSIS — Z83.3 FAMILY HISTORY OF DIABETES MELLITUS: ICD-10-CM

## 2020-09-18 DIAGNOSIS — L97.526 NON-PRESSURE CHRONIC ULCER OF OTHER PART OF LEFT FOOT WITH BONE INVOLVEMENT WITHOUT EVIDENCE OF NECROSIS: ICD-10-CM

## 2020-09-18 DIAGNOSIS — L97.512 NON-PRESSURE CHRONIC ULCER OF OTHER PART OF RIGHT FOOT WITH FAT LAYER EXPOSED: ICD-10-CM

## 2020-09-18 DIAGNOSIS — Z79.899 OTHER LONG TERM (CURRENT) DRUG THERAPY: ICD-10-CM

## 2020-09-18 DIAGNOSIS — Z80.8 FAMILY HISTORY OF MALIGNANT NEOPLASM OF OTHER ORGANS OR SYSTEMS: ICD-10-CM

## 2020-09-18 DIAGNOSIS — Z87.81 PERSONAL HISTORY OF (HEALED) TRAUMATIC FRACTURE: ICD-10-CM

## 2020-09-18 DIAGNOSIS — Z89.422 ACQUIRED ABSENCE OF OTHER LEFT TOE(S): ICD-10-CM

## 2020-09-18 DIAGNOSIS — I13.10 HYPERTENSIVE HEART AND CHRONIC KIDNEY DISEASE WITHOUT HEART FAILURE, WITH STAGE 1 THROUGH STAGE 4 CHRONIC KIDNEY DISEASE, OR UNSPECIFIED CHRONIC KIDNEY DISEASE: ICD-10-CM

## 2020-09-18 DIAGNOSIS — E11.40 TYPE 2 DIABETES MELLITUS WITH DIABETIC NEUROPATHY, UNSPECIFIED: ICD-10-CM

## 2020-09-18 DIAGNOSIS — Z86.73 PERSONAL HISTORY OF TRANSIENT ISCHEMIC ATTACK (TIA), AND CEREBRAL INFARCTION WITHOUT RESIDUAL DEFICITS: ICD-10-CM

## 2020-09-18 DIAGNOSIS — Z85.828 PERSONAL HISTORY OF OTHER MALIGNANT NEOPLASM OF SKIN: ICD-10-CM

## 2020-09-18 DIAGNOSIS — E11.621 TYPE 2 DIABETES MELLITUS WITH FOOT ULCER: ICD-10-CM

## 2020-09-18 DIAGNOSIS — Z79.82 LONG TERM (CURRENT) USE OF ASPIRIN: ICD-10-CM

## 2020-09-18 DIAGNOSIS — Z94.0 KIDNEY TRANSPLANT STATUS: ICD-10-CM

## 2020-09-27 NOTE — DIETITIAN INITIAL EVALUATION ADULT. - WEIGHT IN LBS
I personally saw Yumiko Camargo and evaluated the patient at 3:10 AM on 09/27/20. He is unable to comprehend and follow through with safety instructions due to underlying behiavor and failure of alternative methods to keep pt safe. Visit Vitals  BP (!) 141/56   Pulse 70   Temp 98.6 °F (37 °C)   Resp 19   Ht 5' 2\" (1.575 m)   Wt 175 lb (79.4 kg)   SpO2 100%   BMI 32.01 kg/m²       Restraint type: Mitt: both  Reason for restraints: patient is pulling IV lines and nasal canula   Duration: 24 hours    Restraints must be removed when an alternative is available and effective and/or patient no longer meets criteria.  The patient will be re-evaluated in 24 hours for need of restraints.  Real Overton MD  [facility] Medicine Resident  [phone] [phone]

## 2020-10-01 ENCOUNTER — APPOINTMENT (OUTPATIENT)
Dept: PODIATRY | Facility: HOSPITAL | Age: 55
End: 2020-10-01
Payer: MEDICARE

## 2020-10-01 ENCOUNTER — OUTPATIENT (OUTPATIENT)
Dept: OUTPATIENT SERVICES | Facility: HOSPITAL | Age: 55
LOS: 1 days | Discharge: HOME | End: 2020-10-01
Payer: MEDICARE

## 2020-10-01 VITALS
BODY MASS INDEX: 41.75 KG/M2 | SYSTOLIC BLOOD PRESSURE: 156 MMHG | RESPIRATION RATE: 20 BRPM | OXYGEN SATURATION: 94 % | DIASTOLIC BLOOD PRESSURE: 73 MMHG | TEMPERATURE: 97.4 F | HEIGHT: 73 IN | HEART RATE: 63 BPM | WEIGHT: 315 LBS

## 2020-10-01 DIAGNOSIS — Z94.0 KIDNEY TRANSPLANT STATUS: Chronic | ICD-10-CM

## 2020-10-01 DIAGNOSIS — E11.621 TYPE 2 DIABETES MELLITUS WITH FOOT ULCER: ICD-10-CM

## 2020-10-01 DIAGNOSIS — L97.526 NON-PRESSURE CHRONIC ULCER OF OTHER PART OF LEFT FOOT WITH BONE INVOLVEMENT WITHOUT EVIDENCE OF NECROSIS: ICD-10-CM

## 2020-10-01 DIAGNOSIS — N18.6 END STAGE RENAL DISEASE: Chronic | ICD-10-CM

## 2020-10-01 PROCEDURE — G0463: CPT

## 2020-10-01 PROCEDURE — 99024 POSTOP FOLLOW-UP VISIT: CPT

## 2020-10-01 NOTE — REVIEW OF SYSTEMS
[Fever] : no fever [Chills] : no chills [Eye Pain] : no eye pain [Loss Of Hearing] : no hearing loss [Shortness Of Breath] : no shortness of breath [Abdominal Pain] : no abdominal pain [Joint Stiffness] : joint stiffness [Anxiety] : no anxiety [Easy Bleeding] : no tendency for easy bleeding [FreeTextEntry5] : HTN , HLD , diabetic cardio vascular disease  [FreeTextEntry8] : Kidney transplant  [FreeTextEntry9] : Patient has deformities of the forefoot bilateral , hammer toes and previous amputations for OM  [de-identified] : DFU 2 right 2nd ( ssf) and DFU 3 distal left 3rd . s/p distal amps 2 weeks post , healed well  [de-identified] : Diabetic neuropathy , IDDM  [de-identified] : IDDM , kidney transplant

## 2020-10-01 NOTE — PHYSICAL EXAM
[Normal Breath Sounds] : Normal breath sounds [1+] : left 1+ [Varicose Veins Of Lower Extremities] : bilaterally [Ankle Swelling On The Right] : mild [Purpura] : no purpura  [Petechiae] : no petechiae [Skin Ulcer] : ulcer [Alert] : alert [Oriented to Person] : oriented to person [Oriented to Place] : oriented to place [Oriented to Time] : oriented to time [Calm] : calm [de-identified] : calm  [de-identified] : HTN, HLD [de-identified] : Hammer toe deformities  [de-identified] : DFU 2 distal right toe and DFU distal left 3rd toe , s/p amps , healed well [de-identified] : IDDM with neuropathy  [FreeTextEntry7] : Left Foot 3rd Digit Distal Amp site- Closed [de-identified] : No Dressing [de-identified] : Right Plantar Foot- Closed [de-identified] : No Dressing [FreeTextEntry1] : Right Lateral Foot- Closed [de-identified] : No Dressing

## 2020-10-01 NOTE — ASSESSMENT
[Verbal] : Verbal [Demo] : Demo [Patient] : Patient [Spouse] : Spouse [Good - alert, interested, motivated] : Good - alert, interested, motivated [Verbalizes knowledge/Understanding] : Verbalizes knowledge/understanding [Foot Care] : foot care [Skin Care] : skin care [Pressure relief] : pressure relief [Signs and symptoms of infection] : sign and symptoms of infection [How and When to Call] : how and when to call [Labs and Tests] : labs and tests [Off-loading] : off-loading [Compression Therapy] : compression therapy [Discharge Planning] : discharge planning [Patient responsibility to plan of care] : patient responsibility to plan of care [] : Yes [Stable] : stable [Home] : Home [Ambulatory] : Ambulatory [FreeTextEntry2] : Maintain optimal skin integrity\par  [FreeTextEntry4] : Dr Araiza/ Photos taken\par Pt states he has not scheduled Vascular studies but will- Dr Araiza aware\par Wounds closed- pt discharged from [facility] by Dr Araiza

## 2020-10-02 DIAGNOSIS — Z87.81 PERSONAL HISTORY OF (HEALED) TRAUMATIC FRACTURE: ICD-10-CM

## 2020-10-02 DIAGNOSIS — L84 CORNS AND CALLOSITIES: ICD-10-CM

## 2020-10-02 DIAGNOSIS — L97.526 NON-PRESSURE CHRONIC ULCER OF OTHER PART OF LEFT FOOT WITH BONE INVOLVEMENT WITHOUT EVIDENCE OF NECROSIS: ICD-10-CM

## 2020-10-02 DIAGNOSIS — Z79.82 LONG TERM (CURRENT) USE OF ASPIRIN: ICD-10-CM

## 2020-10-02 DIAGNOSIS — E11.22 TYPE 2 DIABETES MELLITUS WITH DIABETIC CHRONIC KIDNEY DISEASE: ICD-10-CM

## 2020-10-02 DIAGNOSIS — E78.5 HYPERLIPIDEMIA, UNSPECIFIED: ICD-10-CM

## 2020-10-02 DIAGNOSIS — Z80.8 FAMILY HISTORY OF MALIGNANT NEOPLASM OF OTHER ORGANS OR SYSTEMS: ICD-10-CM

## 2020-10-02 DIAGNOSIS — G47.33 OBSTRUCTIVE SLEEP APNEA (ADULT) (PEDIATRIC): ICD-10-CM

## 2020-10-02 DIAGNOSIS — Z94.0 KIDNEY TRANSPLANT STATUS: ICD-10-CM

## 2020-10-02 DIAGNOSIS — N18.30 CHRONIC KIDNEY DISEASE, STAGE 3 UNSPECIFIED: ICD-10-CM

## 2020-10-02 DIAGNOSIS — Z85.828 PERSONAL HISTORY OF OTHER MALIGNANT NEOPLASM OF SKIN: ICD-10-CM

## 2020-10-02 DIAGNOSIS — I13.10 HYPERTENSIVE HEART AND CHRONIC KIDNEY DISEASE WITHOUT HEART FAILURE, WITH STAGE 1 THROUGH STAGE 4 CHRONIC KIDNEY DISEASE, OR UNSPECIFIED CHRONIC KIDNEY DISEASE: ICD-10-CM

## 2020-10-02 DIAGNOSIS — Z79.899 OTHER LONG TERM (CURRENT) DRUG THERAPY: ICD-10-CM

## 2020-10-02 DIAGNOSIS — Z89.422 ACQUIRED ABSENCE OF OTHER LEFT TOE(S): ICD-10-CM

## 2020-10-02 DIAGNOSIS — Z86.718 PERSONAL HISTORY OF OTHER VENOUS THROMBOSIS AND EMBOLISM: ICD-10-CM

## 2020-10-02 DIAGNOSIS — E11.621 TYPE 2 DIABETES MELLITUS WITH FOOT ULCER: ICD-10-CM

## 2020-10-02 DIAGNOSIS — Z83.3 FAMILY HISTORY OF DIABETES MELLITUS: ICD-10-CM

## 2020-10-02 DIAGNOSIS — Z80.3 FAMILY HISTORY OF MALIGNANT NEOPLASM OF BREAST: ICD-10-CM

## 2020-10-02 DIAGNOSIS — Z86.73 PERSONAL HISTORY OF TRANSIENT ISCHEMIC ATTACK (TIA), AND CEREBRAL INFARCTION WITHOUT RESIDUAL DEFICITS: ICD-10-CM

## 2020-10-02 DIAGNOSIS — E11.40 TYPE 2 DIABETES MELLITUS WITH DIABETIC NEUROPATHY, UNSPECIFIED: ICD-10-CM

## 2020-10-02 DIAGNOSIS — Z89.421 ACQUIRED ABSENCE OF OTHER RIGHT TOE(S): ICD-10-CM

## 2020-10-02 DIAGNOSIS — Z79.4 LONG TERM (CURRENT) USE OF INSULIN: ICD-10-CM

## 2020-10-02 DIAGNOSIS — L97.512 NON-PRESSURE CHRONIC ULCER OF OTHER PART OF RIGHT FOOT WITH FAT LAYER EXPOSED: ICD-10-CM

## 2020-10-19 ENCOUNTER — OUTPATIENT (OUTPATIENT)
Dept: OUTPATIENT SERVICES | Facility: HOSPITAL | Age: 55
LOS: 1 days | End: 2020-10-19
Payer: MEDICARE

## 2020-10-19 ENCOUNTER — RESULT REVIEW (OUTPATIENT)
Age: 55
End: 2020-10-19

## 2020-10-19 DIAGNOSIS — N18.6 END STAGE RENAL DISEASE: Chronic | ICD-10-CM

## 2020-10-19 DIAGNOSIS — E11.621 TYPE 2 DIABETES MELLITUS WITH FOOT ULCER: ICD-10-CM

## 2020-10-19 DIAGNOSIS — Z94.0 KIDNEY TRANSPLANT STATUS: Chronic | ICD-10-CM

## 2020-10-19 PROCEDURE — 93923 UPR/LXTR ART STDY 3+ LVLS: CPT

## 2020-10-19 PROCEDURE — 93923 UPR/LXTR ART STDY 3+ LVLS: CPT | Mod: 26

## 2020-12-14 ENCOUNTER — APPOINTMENT (OUTPATIENT)
Dept: NEPHROLOGY | Facility: CLINIC | Age: 55
End: 2020-12-14
Payer: MEDICARE

## 2020-12-14 VITALS
HEIGHT: 73 IN | DIASTOLIC BLOOD PRESSURE: 68 MMHG | BODY MASS INDEX: 41.75 KG/M2 | SYSTOLIC BLOOD PRESSURE: 114 MMHG | HEART RATE: 69 BPM | TEMPERATURE: 98 F | WEIGHT: 315 LBS | OXYGEN SATURATION: 100 %

## 2020-12-14 DIAGNOSIS — Z79.899 OTHER LONG TERM (CURRENT) DRUG THERAPY: ICD-10-CM

## 2020-12-14 LAB
25(OH)D3 SERPL-MCNC: 20.8 NG/ML
ALBUMIN SERPL ELPH-MCNC: 4 G/DL
ALP BLD-CCNC: 121 U/L
ALT SERPL-CCNC: 15 U/L
ANION GAP SERPL CALC-SCNC: 10 MMOL/L
APPEARANCE: CLEAR
AST SERPL-CCNC: 21 U/L
BACTERIA: NEGATIVE
BASOPHILS # BLD AUTO: 0.07 K/UL
BASOPHILS NFR BLD AUTO: 1 %
BILIRUB SERPL-MCNC: 0.4 MG/DL
BILIRUBIN URINE: NEGATIVE
BLOOD URINE: NEGATIVE
BUN SERPL-MCNC: 22 MG/DL
CALCIUM SERPL-MCNC: 8.9 MG/DL
CALCIUM SERPL-MCNC: 8.9 MG/DL
CHLORIDE SERPL-SCNC: 101 MMOL/L
CHOLEST SERPL-MCNC: 128 MG/DL
CO2 SERPL-SCNC: 27 MMOL/L
COLOR: YELLOW
CREAT SERPL-MCNC: 1.4 MG/DL
CREAT SPEC-SCNC: 111 MG/DL
CREAT/PROT UR: 0.1 RATIO
EOSINOPHIL # BLD AUTO: 0.38 K/UL
EOSINOPHIL NFR BLD AUTO: 5.4 %
GLUCOSE QUALITATIVE U: ABNORMAL
GLUCOSE SERPL-MCNC: 271 MG/DL
HCT VFR BLD CALC: 44.1 %
HDLC SERPL-MCNC: 38 MG/DL
HGB BLD-MCNC: 13.7 G/DL
HYALINE CASTS: 0 /LPF
IMM GRANULOCYTES NFR BLD AUTO: 0.4 %
KETONES URINE: NEGATIVE
LDH SERPL-CCNC: 175 U/L
LDLC SERPL CALC-MCNC: 64 MG/DL
LEUKOCYTE ESTERASE URINE: NEGATIVE
LYMPHOCYTES # BLD AUTO: 1.19 K/UL
LYMPHOCYTES NFR BLD AUTO: 17 %
MAGNESIUM SERPL-MCNC: 1.9 MG/DL
MAN DIFF?: NORMAL
MCHC RBC-ENTMCNC: 28.7 PG
MCHC RBC-ENTMCNC: 31.1 GM/DL
MCV RBC AUTO: 92.3 FL
MICROSCOPIC-UA: NORMAL
MONOCYTES # BLD AUTO: 0.73 K/UL
MONOCYTES NFR BLD AUTO: 10.5 %
NEUTROPHILS # BLD AUTO: 4.58 K/UL
NEUTROPHILS NFR BLD AUTO: 65.7 %
NITRITE URINE: NEGATIVE
NONHDLC SERPL-MCNC: 90 MG/DL
PARATHYROID HORMONE INTACT: 57 PG/ML
PH URINE: 6
PHOSPHATE SERPL-MCNC: 3.4 MG/DL
PLATELET # BLD AUTO: 204 K/UL
POTASSIUM SERPL-SCNC: 4.4 MMOL/L
PROT SERPL-MCNC: 6.3 G/DL
PROT UR-MCNC: 12 MG/DL
PROTEIN URINE: NORMAL
RBC # BLD: 4.78 M/UL
RBC # FLD: 13.9 %
RED BLOOD CELLS URINE: 1 /HPF
SODIUM SERPL-SCNC: 138 MMOL/L
SPECIFIC GRAVITY URINE: 1.02
SQUAMOUS EPITHELIAL CELLS: 0 /HPF
TACROLIMUS SERPL-MCNC: 5.8 NG/ML
TRIGL SERPL-MCNC: 127 MG/DL
URATE SERPL-MCNC: 5.1 MG/DL
UROBILINOGEN URINE: NORMAL
WBC # FLD AUTO: 6.98 K/UL
WHITE BLOOD CELLS URINE: 1 /HPF

## 2020-12-14 PROCEDURE — 99215 OFFICE O/P EST HI 40 MIN: CPT

## 2020-12-14 NOTE — REASON FOR VISIT
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

## 2020-12-14 NOTE — REVIEW OF SYSTEMS
[Fever] : no fever [Chills] : no chills [As Noted in HPI] : as noted in HPI [As noted in HPI] : as noted in HPI [Lower Ext Edema] : lower extremity edema [Cough] : no cough [SOB on Exertion] : shortness of breath during exertion [Negative] : Heme/Lymph [FreeTextEntry4] : chronic rhinorrhea [FreeTextEntry6] : on home oxygen

## 2020-12-14 NOTE — QUALITY MEASURES
[No] : patient is not an appropriate candidate for kidney transplantation evaluation [Patient has started or completed the] : patient has started or completed the process of evaluation for renal transplant

## 2020-12-14 NOTE — ASSESSMENT
[FreeTextEntry1] : LURT:  Has chronic lower extremity edema, chronic  Will check labs.  \par \par Squamous cell Ca: If recurs/new lesions,will  decrease azathioprine. Currently to continue the dose\par \par Toe surgery: Discussed foot care, follows with podiatrist.\par \par Infectious Prophylaxis: On Bactrim.  Yearly Flu vaccine (had for 2020) and pneumonia vaccine (prevnar once and pneumovax every 5 years) advised.\par \par LE edema :Had sonogram already from primary MD in July 2018 no DVT. Discussed low sodium diet, will check urinalysis.\par \par Hypertension:Controlled. Continue current medications.\par \par DM:  On insulin pump.-On follow up with endocrinology. Dr. Garcia\par \par CVA -Oct 2015. Minimal residues including balance issues and occasional swallowing issues with solids.  Continues to see neurologist , exercise.\par \par Hyperlipidemia: On statin.\par \par Health maintenance - Seeing dermatology, was treated with surgery for squamous cell Ca with Moh surgery and skin grafting on left Pinna. Follows up with Ophthalmology regularly. \par \par Discussed weight loss strategies, low calorie diet. Regular daily physical activity.\par \par Discussed Renal Preservation strategies including achieving optimal weight through calory restriction and regular exercise, daily exercise, sleep hygiene, optimized control of glucose, blood pressure, lipids, avoidance of NSAIDs, Low Na and Low animal protein diet and medication adherence.\par \par Has had eye and derm check frequently.\par \par He will f/u with primary nephrologist periodically Dr. Barker in 3-6 months. F/u at transplant center yearly.\par \par \par \par \par

## 2020-12-14 NOTE — PROGRESS NOTE ADULT - PROBLEM SELECTOR PLAN 1
Admit for osteomyelitis send blood and urine cx,serial lactate levels,monitor vitals closley,ivfs hydration,monitor urine output and renal profile,iv abx as per id cons. patient refused

## 2020-12-14 NOTE — HISTORY OF PRESENT ILLNESS
[FreeTextEntry1] : 54 y/o M s/p LURT 12/9/13. \par Left ear squamous cell Ca, s/p Moh surgery, Had skin grafting from the chest wall.\par Also had basal cell on nose and left arm squamous cell Ca in 2020\par Right second toe tip surgically removed for infection\par Has had LE edema right > left, chronic\par No SOB/fever/urinary symptoms.\par Still has left side heat/cold sensory loss. Not driving, balance is still not perfect. \par Still has difficulty swallowing solids, able to swallow with liquids\par Glucose level has improved, he is on insulin pump for glycemic control now. Dr. Jasbir Gacria\par Last A1C 7.8. Trying to get CGM.\par on f/u with Dr. Miranda- Primary physician\par Nephrologist: Dr. Barker.\par Has had Fluvaccine\par Neurologist: Dr. Campbell\par Endocrinologist- Dr. Garcia\par

## 2020-12-14 NOTE — PHYSICAL EXAM
[General Appearance - Alert] : alert [General Appearance - Well Nourished] : well nourished [General Appearance - Well Developed] : well developed [Sclera] : the sclera and conjunctiva were normal [Extraocular Movements] : extraocular movements were intact [Hearing Threshold Finger Rub Not Cook] : hearing was normal [Oropharynx] : the oropharynx was normal [Neck Cervical Mass (___cm)] : no neck mass was observed [Jugular Venous Distention Increased] : there was no jugular-venous distention [Respiration, Rhythm And Depth] : normal respiratory rhythm and effort [Auscultation Breath Sounds / Voice Sounds] : lungs were clear to auscultation bilaterally [Heart Sounds] : normal S1 and S2 [Heart Sounds Gallop] : no gallops [Heart Sounds Pericardial Friction Rub] : no pericardial rub [Abdomen Soft] : soft [Abdomen Tenderness] : non-tender [Cervical Lymph Nodes Enlarged Posterior Bilaterally] : posterior cervical [Cervical Lymph Nodes Enlarged Anterior Bilaterally] : anterior cervical [Supraclavicular Lymph Nodes Enlarged Bilaterally] : supraclavicular [Axillary Lymph Nodes Enlarged Bilaterally] : axillary [Inguinal Lymph Nodes Enlarged Bilaterally] : inguinal [No CVA Tenderness] : no ~M costovertebral angle tenderness [No Spinal Tenderness] : no spinal tenderness [Motor Tone] : muscle strength and tone were normal [___ (cm) Fistula] : [unfilled] (cm) fistula [] : no rash [FreeTextEntry1] : no tremor [Oriented To Time, Place, And Person] : oriented to person, place, and time [Affect] : the affect was normal [Mood] : the mood was normal

## 2020-12-17 LAB
BKV DNA SPEC QL NAA+PROBE: NOT DETECTED COPIES/ML
CMV DNA SPEC QL NAA+PROBE: NOT DETECTED IU/ML

## 2021-02-10 ENCOUNTER — TRANSCRIPTION ENCOUNTER (OUTPATIENT)
Age: 56
End: 2021-02-10

## 2021-02-10 ENCOUNTER — INPATIENT (INPATIENT)
Facility: HOSPITAL | Age: 56
LOS: 5 days | Discharge: HOME | DRG: 617 | End: 2021-02-16
Attending: INTERNAL MEDICINE | Admitting: INTERNAL MEDICINE
Payer: MEDICARE

## 2021-02-10 VITALS
HEIGHT: 73 IN | OXYGEN SATURATION: 97 % | DIASTOLIC BLOOD PRESSURE: 97 MMHG | WEIGHT: 315 LBS | HEART RATE: 65 BPM | TEMPERATURE: 97 F | RESPIRATION RATE: 18 BRPM | SYSTOLIC BLOOD PRESSURE: 194 MMHG

## 2021-02-10 DIAGNOSIS — M86.9 OSTEOMYELITIS, UNSPECIFIED: ICD-10-CM

## 2021-02-10 DIAGNOSIS — Z94.0 KIDNEY TRANSPLANT STATUS: ICD-10-CM

## 2021-02-10 DIAGNOSIS — E11.9 TYPE 2 DIABETES MELLITUS WITHOUT COMPLICATIONS: ICD-10-CM

## 2021-02-10 DIAGNOSIS — L03.90 CELLULITIS, UNSPECIFIED: ICD-10-CM

## 2021-02-10 DIAGNOSIS — N18.6 END STAGE RENAL DISEASE: Chronic | ICD-10-CM

## 2021-02-10 DIAGNOSIS — Z29.9 ENCOUNTER FOR PROPHYLACTIC MEASURES, UNSPECIFIED: ICD-10-CM

## 2021-02-10 DIAGNOSIS — E78.5 HYPERLIPIDEMIA, UNSPECIFIED: ICD-10-CM

## 2021-02-10 DIAGNOSIS — C44.229 SQUAMOUS CELL CARCINOMA OF SKIN OF LEFT EAR AND EXTERNAL AURICULAR CANAL: ICD-10-CM

## 2021-02-10 DIAGNOSIS — I63.9 CEREBRAL INFARCTION, UNSPECIFIED: ICD-10-CM

## 2021-02-10 DIAGNOSIS — G62.9 POLYNEUROPATHY, UNSPECIFIED: ICD-10-CM

## 2021-02-10 DIAGNOSIS — I10 ESSENTIAL (PRIMARY) HYPERTENSION: ICD-10-CM

## 2021-02-10 DIAGNOSIS — Z89.421 ACQUIRED ABSENCE OF OTHER RIGHT TOE(S): Chronic | ICD-10-CM

## 2021-02-10 DIAGNOSIS — Z94.0 KIDNEY TRANSPLANT STATUS: Chronic | ICD-10-CM

## 2021-02-10 DIAGNOSIS — N18.9 CHRONIC KIDNEY DISEASE, UNSPECIFIED: ICD-10-CM

## 2021-02-10 LAB
ALBUMIN SERPL ELPH-MCNC: 3.9 G/DL — SIGNIFICANT CHANGE UP (ref 3.3–5)
ALP SERPL-CCNC: 147 U/L — HIGH (ref 40–120)
ALT FLD-CCNC: 24 U/L — SIGNIFICANT CHANGE UP (ref 12–78)
ANION GAP SERPL CALC-SCNC: 6 MMOL/L — SIGNIFICANT CHANGE UP (ref 5–17)
APPEARANCE UR: ABNORMAL
APTT BLD: 30.5 SEC — SIGNIFICANT CHANGE UP (ref 27.5–35.5)
AST SERPL-CCNC: 15 U/L — SIGNIFICANT CHANGE UP (ref 15–37)
B-OH-BUTYR SERPL-SCNC: 0.2 MMOL/L — SIGNIFICANT CHANGE UP
BASOPHILS # BLD AUTO: 0.05 K/UL — SIGNIFICANT CHANGE UP (ref 0–0.2)
BASOPHILS NFR BLD AUTO: 0.8 % — SIGNIFICANT CHANGE UP (ref 0–2)
BILIRUB SERPL-MCNC: 0.5 MG/DL — SIGNIFICANT CHANGE UP (ref 0.2–1.2)
BILIRUB UR-MCNC: NEGATIVE — SIGNIFICANT CHANGE UP
BUN SERPL-MCNC: 27 MG/DL — HIGH (ref 7–23)
CALCIUM SERPL-MCNC: 9.2 MG/DL — SIGNIFICANT CHANGE UP (ref 8.5–10.1)
CHLORIDE SERPL-SCNC: 102 MMOL/L — SIGNIFICANT CHANGE UP (ref 96–108)
CO2 SERPL-SCNC: 29 MMOL/L — SIGNIFICANT CHANGE UP (ref 22–31)
COLOR SPEC: SIGNIFICANT CHANGE UP
CREAT SERPL-MCNC: 1.3 MG/DL — SIGNIFICANT CHANGE UP (ref 0.5–1.3)
DIFF PNL FLD: ABNORMAL
EOSINOPHIL # BLD AUTO: 0.26 K/UL — SIGNIFICANT CHANGE UP (ref 0–0.5)
EOSINOPHIL NFR BLD AUTO: 4.1 % — SIGNIFICANT CHANGE UP (ref 0–6)
GLUCOSE SERPL-MCNC: 361 MG/DL — HIGH (ref 70–99)
GLUCOSE UR QL: 1000 MG/DL
HCT VFR BLD CALC: 43.8 % — SIGNIFICANT CHANGE UP (ref 39–50)
HGB BLD-MCNC: 14.6 G/DL — SIGNIFICANT CHANGE UP (ref 13–17)
IMM GRANULOCYTES NFR BLD AUTO: 0.3 % — SIGNIFICANT CHANGE UP (ref 0–1.5)
INR BLD: 0.96 RATIO — SIGNIFICANT CHANGE UP (ref 0.88–1.16)
KETONES UR-MCNC: NEGATIVE — SIGNIFICANT CHANGE UP
LACTATE SERPL-SCNC: 0.9 MMOL/L — SIGNIFICANT CHANGE UP (ref 0.7–2)
LEUKOCYTE ESTERASE UR-ACNC: NEGATIVE — SIGNIFICANT CHANGE UP
LYMPHOCYTES # BLD AUTO: 0.83 K/UL — LOW (ref 1–3.3)
LYMPHOCYTES # BLD AUTO: 13.1 % — SIGNIFICANT CHANGE UP (ref 13–44)
MCHC RBC-ENTMCNC: 28.6 PG — SIGNIFICANT CHANGE UP (ref 27–34)
MCHC RBC-ENTMCNC: 33.3 GM/DL — SIGNIFICANT CHANGE UP (ref 32–36)
MCV RBC AUTO: 85.9 FL — SIGNIFICANT CHANGE UP (ref 80–100)
MONOCYTES # BLD AUTO: 0.47 K/UL — SIGNIFICANT CHANGE UP (ref 0–0.9)
MONOCYTES NFR BLD AUTO: 7.4 % — SIGNIFICANT CHANGE UP (ref 2–14)
NEUTROPHILS # BLD AUTO: 4.71 K/UL — SIGNIFICANT CHANGE UP (ref 1.8–7.4)
NEUTROPHILS NFR BLD AUTO: 74.3 % — SIGNIFICANT CHANGE UP (ref 43–77)
NITRITE UR-MCNC: NEGATIVE — SIGNIFICANT CHANGE UP
NRBC # BLD: 0 /100 WBCS — SIGNIFICANT CHANGE UP (ref 0–0)
PH UR: 6 — SIGNIFICANT CHANGE UP (ref 5–8)
PLATELET # BLD AUTO: 204 K/UL — SIGNIFICANT CHANGE UP (ref 150–400)
POTASSIUM SERPL-MCNC: 4.6 MMOL/L — SIGNIFICANT CHANGE UP (ref 3.5–5.3)
POTASSIUM SERPL-SCNC: 4.6 MMOL/L — SIGNIFICANT CHANGE UP (ref 3.5–5.3)
PROT SERPL-MCNC: 8.1 G/DL — SIGNIFICANT CHANGE UP (ref 6–8.3)
PROT UR-MCNC: 30 MG/DL
PROTHROM AB SERPL-ACNC: 11.2 SEC — SIGNIFICANT CHANGE UP (ref 10.6–13.6)
RBC # BLD: 5.1 M/UL — SIGNIFICANT CHANGE UP (ref 4.2–5.8)
RBC # FLD: 13.5 % — SIGNIFICANT CHANGE UP (ref 10.3–14.5)
SARS-COV-2 RNA SPEC QL NAA+PROBE: SIGNIFICANT CHANGE UP
SODIUM SERPL-SCNC: 137 MMOL/L — SIGNIFICANT CHANGE UP (ref 135–145)
SP GR SPEC: 1.01 — SIGNIFICANT CHANGE UP (ref 1.01–1.02)
UROBILINOGEN FLD QL: NEGATIVE — SIGNIFICANT CHANGE UP
WBC # BLD: 6.34 K/UL — SIGNIFICANT CHANGE UP (ref 3.8–10.5)
WBC # FLD AUTO: 6.34 K/UL — SIGNIFICANT CHANGE UP (ref 3.8–10.5)

## 2021-02-10 PROCEDURE — 99222 1ST HOSP IP/OBS MODERATE 55: CPT | Mod: 57

## 2021-02-10 PROCEDURE — 99222 1ST HOSP IP/OBS MODERATE 55: CPT

## 2021-02-10 PROCEDURE — 93971 EXTREMITY STUDY: CPT | Mod: 26,RT

## 2021-02-10 PROCEDURE — 71045 X-RAY EXAM CHEST 1 VIEW: CPT | Mod: 26

## 2021-02-10 PROCEDURE — 99285 EMERGENCY DEPT VISIT HI MDM: CPT

## 2021-02-10 PROCEDURE — 73630 X-RAY EXAM OF FOOT: CPT | Mod: 26,RT

## 2021-02-10 RX ORDER — AZATHIOPRINE 100 MG/1
100 TABLET ORAL DAILY
Refills: 0 | Status: DISCONTINUED | OUTPATIENT
Start: 2021-02-10 | End: 2021-02-11

## 2021-02-10 RX ORDER — TACROLIMUS 5 MG/1
3 CAPSULE ORAL
Qty: 0 | Refills: 0 | DISCHARGE

## 2021-02-10 RX ORDER — VANCOMYCIN HCL 1 G
1000 VIAL (EA) INTRAVENOUS EVERY 12 HOURS
Refills: 0 | Status: DISCONTINUED | OUTPATIENT
Start: 2021-02-10 | End: 2021-02-10

## 2021-02-10 RX ORDER — DEXTROSE 50 % IN WATER 50 %
15 SYRINGE (ML) INTRAVENOUS ONCE
Refills: 0 | Status: DISCONTINUED | OUTPATIENT
Start: 2021-02-10 | End: 2021-02-11

## 2021-02-10 RX ORDER — VANCOMYCIN HCL 1 G
1000 VIAL (EA) INTRAVENOUS ONCE
Refills: 0 | Status: COMPLETED | OUTPATIENT
Start: 2021-02-10 | End: 2021-02-10

## 2021-02-10 RX ORDER — HEPARIN SODIUM 5000 [USP'U]/ML
5000 INJECTION INTRAVENOUS; SUBCUTANEOUS ONCE
Refills: 0 | Status: COMPLETED | OUTPATIENT
Start: 2021-02-10 | End: 2021-02-10

## 2021-02-10 RX ORDER — DEXTROSE 50 % IN WATER 50 %
25 SYRINGE (ML) INTRAVENOUS ONCE
Refills: 0 | Status: DISCONTINUED | OUTPATIENT
Start: 2021-02-10 | End: 2021-02-11

## 2021-02-10 RX ORDER — METOPROLOL TARTRATE 50 MG
75 TABLET ORAL
Refills: 0 | Status: DISCONTINUED | OUTPATIENT
Start: 2021-02-10 | End: 2021-02-11

## 2021-02-10 RX ORDER — CEFTRIAXONE 500 MG/1
2000 INJECTION, POWDER, FOR SOLUTION INTRAMUSCULAR; INTRAVENOUS EVERY 24 HOURS
Refills: 0 | Status: DISCONTINUED | OUTPATIENT
Start: 2021-02-10 | End: 2021-02-11

## 2021-02-10 RX ORDER — CEFTRIAXONE 500 MG/1
2000 INJECTION, POWDER, FOR SOLUTION INTRAMUSCULAR; INTRAVENOUS ONCE
Refills: 0 | Status: COMPLETED | OUTPATIENT
Start: 2021-02-10 | End: 2021-02-10

## 2021-02-10 RX ORDER — GABAPENTIN 400 MG/1
300 CAPSULE ORAL
Refills: 0 | Status: DISCONTINUED | OUTPATIENT
Start: 2021-02-10 | End: 2021-02-11

## 2021-02-10 RX ORDER — INSULIN LISPRO 100/ML
3 VIAL (ML) SUBCUTANEOUS
Refills: 0 | Status: DISCONTINUED | OUTPATIENT
Start: 2021-02-10 | End: 2021-02-10

## 2021-02-10 RX ORDER — INSULIN LISPRO 100/ML
8 VIAL (ML) SUBCUTANEOUS
Refills: 0 | Status: DISCONTINUED | OUTPATIENT
Start: 2021-02-10 | End: 2021-02-11

## 2021-02-10 RX ORDER — FAMOTIDINE 10 MG/ML
20 INJECTION INTRAVENOUS DAILY
Refills: 0 | Status: DISCONTINUED | OUTPATIENT
Start: 2021-02-10 | End: 2021-02-11

## 2021-02-10 RX ORDER — TACROLIMUS 5 MG/1
1.5 CAPSULE ORAL
Refills: 0 | Status: DISCONTINUED | OUTPATIENT
Start: 2021-02-10 | End: 2021-02-11

## 2021-02-10 RX ORDER — HYDRALAZINE HCL 50 MG
25 TABLET ORAL THREE TIMES A DAY
Refills: 0 | Status: DISCONTINUED | OUTPATIENT
Start: 2021-02-10 | End: 2021-02-11

## 2021-02-10 RX ORDER — SODIUM CHLORIDE 9 MG/ML
1000 INJECTION INTRAMUSCULAR; INTRAVENOUS; SUBCUTANEOUS ONCE
Refills: 0 | Status: COMPLETED | OUTPATIENT
Start: 2021-02-10 | End: 2021-02-10

## 2021-02-10 RX ORDER — ASPIRIN/CALCIUM CARB/MAGNESIUM 324 MG
325 TABLET ORAL DAILY
Refills: 0 | Status: DISCONTINUED | OUTPATIENT
Start: 2021-02-10 | End: 2021-02-10

## 2021-02-10 RX ORDER — HYDRALAZINE HCL 50 MG
75 TABLET ORAL THREE TIMES A DAY
Refills: 0 | Status: DISCONTINUED | OUTPATIENT
Start: 2021-02-10 | End: 2021-02-10

## 2021-02-10 RX ORDER — LINEZOLID 600 MG/300ML
600 INJECTION, SOLUTION INTRAVENOUS ONCE
Refills: 0 | Status: COMPLETED | OUTPATIENT
Start: 2021-02-10 | End: 2021-02-10

## 2021-02-10 RX ORDER — ASPIRIN/CALCIUM CARB/MAGNESIUM 324 MG
1 TABLET ORAL
Qty: 0 | Refills: 0 | DISCHARGE

## 2021-02-10 RX ORDER — LINEZOLID 600 MG/300ML
600 INJECTION, SOLUTION INTRAVENOUS ONCE
Refills: 0 | Status: DISCONTINUED | OUTPATIENT
Start: 2021-02-10 | End: 2021-02-10

## 2021-02-10 RX ORDER — DEXTROSE 50 % IN WATER 50 %
12.5 SYRINGE (ML) INTRAVENOUS ONCE
Refills: 0 | Status: DISCONTINUED | OUTPATIENT
Start: 2021-02-10 | End: 2021-02-11

## 2021-02-10 RX ORDER — GLUCAGON INJECTION, SOLUTION 0.5 MG/.1ML
1 INJECTION, SOLUTION SUBCUTANEOUS ONCE
Refills: 0 | Status: DISCONTINUED | OUTPATIENT
Start: 2021-02-10 | End: 2021-02-11

## 2021-02-10 RX ORDER — LOSARTAN POTASSIUM 100 MG/1
25 TABLET, FILM COATED ORAL DAILY
Refills: 0 | Status: DISCONTINUED | OUTPATIENT
Start: 2021-02-10 | End: 2021-02-11

## 2021-02-10 RX ORDER — INSULIN LISPRO 100/ML
VIAL (ML) SUBCUTANEOUS AT BEDTIME
Refills: 0 | Status: DISCONTINUED | OUTPATIENT
Start: 2021-02-10 | End: 2021-02-11

## 2021-02-10 RX ORDER — INSULIN LISPRO 100/ML
VIAL (ML) SUBCUTANEOUS
Refills: 0 | Status: DISCONTINUED | OUTPATIENT
Start: 2021-02-10 | End: 2021-02-11

## 2021-02-10 RX ORDER — INSULIN GLARGINE 100 [IU]/ML
25 INJECTION, SOLUTION SUBCUTANEOUS AT BEDTIME
Refills: 0 | Status: DISCONTINUED | OUTPATIENT
Start: 2021-02-10 | End: 2021-02-11

## 2021-02-10 RX ORDER — SODIUM CHLORIDE 9 MG/ML
1000 INJECTION, SOLUTION INTRAVENOUS
Refills: 0 | Status: DISCONTINUED | OUTPATIENT
Start: 2021-02-10 | End: 2021-02-11

## 2021-02-10 RX ORDER — ATORVASTATIN CALCIUM 80 MG/1
10 TABLET, FILM COATED ORAL AT BEDTIME
Refills: 0 | Status: DISCONTINUED | OUTPATIENT
Start: 2021-02-10 | End: 2021-02-11

## 2021-02-10 RX ADMIN — HEPARIN SODIUM 5000 UNIT(S): 5000 INJECTION INTRAVENOUS; SUBCUTANEOUS at 21:53

## 2021-02-10 RX ADMIN — LINEZOLID 300 MILLIGRAM(S): 600 INJECTION, SOLUTION INTRAVENOUS at 18:07

## 2021-02-10 RX ADMIN — SODIUM CHLORIDE 1000 MILLILITER(S): 9 INJECTION INTRAMUSCULAR; INTRAVENOUS; SUBCUTANEOUS at 10:13

## 2021-02-10 RX ADMIN — Medication 250 MILLIGRAM(S): at 11:34

## 2021-02-10 RX ADMIN — Medication 75 MILLIGRAM(S): at 17:27

## 2021-02-10 RX ADMIN — INSULIN GLARGINE 25 UNIT(S): 100 INJECTION, SOLUTION SUBCUTANEOUS at 21:53

## 2021-02-10 RX ADMIN — Medication 2: at 21:53

## 2021-02-10 RX ADMIN — TACROLIMUS 1.5 MILLIGRAM(S): 5 CAPSULE ORAL at 21:25

## 2021-02-10 RX ADMIN — GABAPENTIN 300 MILLIGRAM(S): 400 CAPSULE ORAL at 17:27

## 2021-02-10 RX ADMIN — Medication 4: at 17:27

## 2021-02-10 RX ADMIN — Medication 0.1 MILLIGRAM(S): at 17:27

## 2021-02-10 RX ADMIN — LOSARTAN POTASSIUM 25 MILLIGRAM(S): 100 TABLET, FILM COATED ORAL at 21:25

## 2021-02-10 RX ADMIN — CEFTRIAXONE 100 MILLIGRAM(S): 500 INJECTION, POWDER, FOR SOLUTION INTRAMUSCULAR; INTRAVENOUS at 15:27

## 2021-02-10 RX ADMIN — ATORVASTATIN CALCIUM 10 MILLIGRAM(S): 80 TABLET, FILM COATED ORAL at 21:25

## 2021-02-10 RX ADMIN — Medication 25 MILLIGRAM(S): at 21:25

## 2021-02-10 NOTE — H&P ADULT - PROBLEM SELECTOR PLAN 2
-RLE warmth, swelling, redness below knee x1 day  -s/p vancomycin, rocephin, IV fluid bolus in ED  -continue vancomycin 1g and rocephin 2g IV -RLE warmth, swelling, redness below knee x1 day Toe OM  -s/p vancomycin, Rocephin, IV fluid bolus in ED  -continue vancomycin 1g and Rocephin 2g IV

## 2021-02-10 NOTE — CONSULT NOTE ADULT - SUBJECTIVE AND OBJECTIVE BOX
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  Redness and swelling have extended proximally over the past few days as well to the foot, ankle, and mid-shin region.  Denies red streaking up the leg, denies F/C/N/V.  Has not been seen for this as of yet, but had made an appointment with wound care for this Friday - presented today for progressive symptoms.  Denies CP/SOB/WOOTEN/dizziness/lightheadedness/palpitations.    Of note, patient has had prior b/l PHUONG/PVR studies 4 months ago.      PAST MEDICAL HISTORY:  History of DVT (deep vein thrombosis)  Squamous cell carcinoma of skin of left ear  CVA (cerebral vascular accident)  Obesity (BMI 30-39.9)  Diabetic peripheral neuropathy  CKD (chronic kidney disease)  Diabetes mellitus  Hyperlipidemia  Hypertension    PAST SURGICAL HISTORY:  End-stage renal failure with renal transplant  2013  H/O foot surgery   - right foot - bone fracture - s/p ORIF and subsequent removal of hardware  s/p b/l  vasectomy  Ear disease  Left inner ear surgery  Multiple toe partial amputations    REVIEW OF SYSTEMS:  General: No acute distress, awake and alert  Head: Normal cephalic/atraumatic  Neck: Denies neck pain or palpable masses, hoarseness or stridor  Lymphatic: Denies cervical or axillary or femoral lymphadenopathy  Chest: No chest pain, cough or hemoptysis  Heart: No chest pain, palpitations  Abdomen: No abdominal distention, nausea or vomiting, no abdominal pain  Extremities: Denies cyanosis, open sores or swollen extremity  Neuro: Denies weakness, paralysis, syncope, loss of vision  Psych: Denies hallucinations, visual disturbances, or depression    MEDICATIONS:  Home Medications:  aspirin 325 mg oral tablet: 1 tab(s) orally once a day (10 Feb 2021 12:01)  azaTHIOprine 100 mg oral tablet: 1 tab(s) orally once a day (10 Feb 2021 12:)  cloNIDine 0.1 mg oral tablet: 1 tab(s) orally 2 times a day (10 Feb 2021 12:01)  famotidine 20 mg oral tablet: 1 tab(s) orally once a day (10 Feb 2021 12:)  gabapentin 300 mg oral capsule: 1 cap(s) orally 2 times a day (10 Feb 2021 12:)  HumaLOG 100 units/mL injectable solution: 3 unit(s) injectable every hour via insulin pump. TDD: 75 units. (10 Feb 2021 12:)  hydrALAZINE 25 mg oral tablet: 1 tab(s) orally 3 times a day (10 Feb 2021 12:)  losartan 25 mg oral tablet: 1 tab(s) orally once a day (10 Feb 2021 12:)  lovastatin 40 mg oral tablet: 1 tab(s) orally once a day (10 Feb 2021 12:)  Metoprolol Tartrate 25 mg oral tablet: 3 tab(s) orally 2 times a day  *conf. with patient and pharmacy. 75mg dose, BID* (10 Feb 2021 12:)  sulfamethoxazole-trimethoprim SS: 1 tab(s) orally once a day (10 Feb 2021 12:01)  tacrolimus: 1.5 milligram(s) orally 2 times a day (10 Feb 2021 12:)    ALLERGIES:  No Known Allergies    SOCIAL HISTORY:  Never smoker, occasional/social ETOH, denies illicit drug use  Retired      FAMILY HISTORY:  FH: skin cancer  Family history of diabetes mellitus (DM)    VITAL SIGNS:  Vital Signs Last 24 Hrs  T(C): 36.7 (10 Feb 2021 12:34), Max: 36.7 (10 Feb 2021 12:34)  T(F): 98 (10 Feb 2021 12:34), Max: 98 (10 Feb 2021 12:34)  HR: 76 (10 Feb 2021 12:34) (65 - 76)  BP: 137/75 (10 Feb 2021 12:34) (137/75 - 194/97)  BP(mean): --  RR: 16 (10 Feb 2021 12:34) (16 - 18)  SpO2: 96% (10 Feb 2021 12:34) (96% - 97%)    PHYSICAL EXAM:  General:  Appears stated age, well-groomed, obese, no distress  HENT:  WNL, no JVD  Chest:  Clear to auscultation bilaterally without W/R/R  Cardiovascular:  Regular rate & rhythm, S1S2  Abdomen: Soft, NT, ND.  +BS x 4.  No rebound or guarding.    Extremities:  Right foot: Multiple partial toe amputations noted.  Great toe with wound at distal tip with eschar.  3rd toe with eschar at distal tip, and small, deep ulceration at plantar aspect of tip.  Foot, ankle, and leg with good skin turgor and warmth.  Blanching erythema noted at forefoot, with normal skin color from mid-foot to ankle with clear demarcation and resumption of erythema above ankle extending to mid-shin.  No appreciable LAG/LAD.  Right calf with moderate swelling, left calf without appreciable swelling, no calf tenderness/palpable cords bilaterally   Vascular: Palpable, 2+ femoral, popliteal, DP pulses bilaterally.  1+ palpable left PT.  Faintly palpable right PT, confirmed with portable doppler - triphasic signal appreciated.    Musculoskeletal:  Diffuse weakness left UE/LE.  Normal strength with resisted right  foot and great toe PF/DF  Neuro/Psych:  Alert, oriented to time, place, and person     LABS:                        14.6   6.34  )-----------( 204      ( 10 Feb 2021 10:11 )             43.8     02-10    137  |  102  |  27<H>  ----------------------------<  361<H>  4.6   |  29  |  1.30    Ca    9.2      10 Feb 2021 10:11    TPro  8.1  /  Alb  3.9  /  TBili  0.5  /  DBili  x   /  AST  15  /  ALT  24  /  AlkPhos  147<H>  02-10    PT/INR - ( 10 Feb 2021 10:11 )   PT: 11.2 sec;   INR: 0.96 ratio    PTT - ( 10 Feb 2021 10:11 )  PTT:30.5 sec  Urinalysis Basic - ( 10 Feb 2021 12:02 )    Color: Pale Yellow / Appearance: Slightly Turbid / S.010 / pH: x  Gluc: x / Ketone: Negative  / Bili: Negative / Urobili: Negative   Blood: x / Protein: 30 mg/dL / Nitrite: Negative   Leuk Esterase: Negative / RBC: 6-10 /HPF / WBC x   Sq Epi: x / Non Sq Epi: Occasional / Bacteria: x  LIVER FUNCTIONS - ( 10 Feb 2021 10:11 )  Alb: 3.9 g/dL / Pro: 8.1 g/dL / ALK PHOS: 147 U/L / ALT: 24 U/L / AST: 15 U/L / GGT: x           RADIOLOGY & ADDITIONAL STUDIES:  EXAM:  US DPLX LWR EXT VEINS LTD RT                        PROCEDURE DATE:  02/10/2021    INTERPRETATION:  CLINICAL INFORMATION: Right middle toe infection  TECHNIQUE: Duplex sonography of the RIGHT LOWER extremity veins with color and spectral Doppler, with and without compression.  FINDINGS:  There is normal compressibility of the right common femoral, femoral and popliteal veins.  The contralateral common femoral vein is patent.  Doppler examination shows normal spontaneous and phasic flow.  No calf vein thrombosis is detected.  IMPRESSION:  No evidence of right lower extremity deep venous thrombosis.  SHAI MULLINS MD; Attending Radiologist  This document has been electronically signed. Feb 10 2021  1:11PM    EXAM:  PHYSIOL EXTREM LOW 3+ LEV BI                        PROCEDURE DATE:  10/19/2020    INTERPRETATION:  History:Bilateral calf ulcers. Type 2 diabetes. Hypertension with hyperlipidemia and diabetes.  Technique: Bilateral PHUONG/PVR  Comparison: None  Findings:  RIGHT  PHUONG: 1.2  Flow study: Good flow from the high thigh through the great toe.  LEFT  PHUONG: 1.2  Flow study: Good flow from the high thigh through the great toe.  Impression:  No evidence of large vessel hemodynamically significant lower extremity arterial disease at rest.  MAXINE BARCENAS M.D., ATTENDING RADIOLOGIST  This document has been electronically signed. 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  WBC count normal, with stable and non-suggestive VS.  Remains afebrile.  As per prior vascular physiologic studies, no evidence of hemodynamically significant lower extremity arterial disease - patient noted to have palpable pulses in the right foot.  No vascular objection to Podiatric surgical intervention.  No further vascular diagnostic testing or interventions are currently indicated.  At this time, there is little to no role for continued Vascular involvement - will sign-off and reconsult on PRN basis.  Discussed with Dr. Rizzo.     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  Redness and swelling have extended proximally over the past few days as well to the foot, ankle, and mid-shin region.  Denies red streaking up the leg, denies F/C/N/V.  Has not been seen for this as of yet, but had made an appointment with wound care for this Friday - presented today for progressive symptoms.  Denies CP/SOB/WOOTEN/dizziness/lightheadedness/palpitations.    Of note, patient has had prior b/l PHUONG/PVR studies 4 months ago.      PAST MEDICAL HISTORY:  History of DVT (deep vein thrombosis)  Squamous cell carcinoma of skin of left ear  CVA (cerebral vascular accident)  Obesity (BMI 30-39.9)  Diabetic peripheral neuropathy  CKD (chronic kidney disease)  Diabetes mellitus  Hyperlipidemia  Hypertension    PAST SURGICAL HISTORY:  End-stage renal failure with renal transplant  2013  H/O foot surgery   - right foot - bone fracture - s/p ORIF and subsequent removal of hardware  s/p b/l  vasectomy  Ear disease  Left inner ear surgery  Multiple toe partial amputations    REVIEW OF SYSTEMS:  General: No acute distress, awake and alert  Head: Normal cephalic/atraumatic  Neck: Denies neck pain or palpable masses, hoarseness or stridor  Lymphatic: Denies cervical or axillary or femoral lymphadenopathy  Chest: No chest pain, cough or hemoptysis  Heart: No chest pain, palpitations  Abdomen: No abdominal distention, nausea or vomiting, no abdominal pain  Extremities: Denies cyanosis, open sores or swollen extremity  Neuro: Denies weakness, paralysis, syncope, loss of vision  Psych: Denies hallucinations, visual disturbances, or depression    MEDICATIONS:  Home Medications:  aspirin 325 mg oral tablet: 1 tab(s) orally once a day (10 Feb 2021 12:01)  azaTHIOprine 100 mg oral tablet: 1 tab(s) orally once a day (10 Feb 2021 12:)  cloNIDine 0.1 mg oral tablet: 1 tab(s) orally 2 times a day (10 Feb 2021 12:01)  famotidine 20 mg oral tablet: 1 tab(s) orally once a day (10 Feb 2021 12:)  gabapentin 300 mg oral capsule: 1 cap(s) orally 2 times a day (10 Feb 2021 12:)  HumaLOG 100 units/mL injectable solution: 3 unit(s) injectable every hour via insulin pump. TDD: 75 units. (10 Feb 2021 12:)  hydrALAZINE 25 mg oral tablet: 1 tab(s) orally 3 times a day (10 Feb 2021 12:)  losartan 25 mg oral tablet: 1 tab(s) orally once a day (10 Feb 2021 12:)  lovastatin 40 mg oral tablet: 1 tab(s) orally once a day (10 Feb 2021 12:)  Metoprolol Tartrate 25 mg oral tablet: 3 tab(s) orally 2 times a day  *conf. with patient and pharmacy. 75mg dose, BID* (10 Feb 2021 12:)  sulfamethoxazole-trimethoprim SS: 1 tab(s) orally once a day (10 Feb 2021 12:)  tacrolimus: 1.5 milligram(s) orally 2 times a day (10 Feb 2021 12:)    ALLERGIES:  No Known Allergies    SOCIAL HISTORY:  Never smoker, occasional/social ETOH, denies illicit drug use  Retired      FAMILY HISTORY:  FH: skin cancer  Family history of diabetes mellitus (DM)    VITAL SIGNS:  Vital Signs Last 24 Hrs  T(C): 36.7 (10 Feb 2021 12:34), Max: 36.7 (10 Feb 2021 12:34)  T(F): 98 (10 Feb 2021 12:34), Max: 98 (10 Feb 2021 12:34)  HR: 76 (10 Feb 2021 12:34) (65 - 76)  BP: 137/75 (10 Feb 2021 12:34) (137/75 - 194/97)  BP(mean): --  RR: 16 (10 Feb 2021 12:34) (16 - 18)  SpO2: 96% (10 Feb 2021 12:34) (96% - 97%)    PHYSICAL EXAM:  General:  Appears stated age, well-groomed, obese, no distress  HENT:  WNL, no JVD  Chest:  Clear to auscultation bilaterally without W/R/R  Cardiovascular:  Regular rate & rhythm, S1S2  Abdomen: Soft, NT, ND.  +BS x 4.  No rebound or guarding.    Extremities:  Right foot: Multiple partial toe amputations noted.  Great toe with wound at distal tip with eschar.  3rd toe with eschar at distal tip, and small, deep ulceration at plantar aspect of tip.  Foot, ankle, and leg with good skin turgor and warmth.  Blanching erythema noted at forefoot, with normal skin color from mid-foot to ankle with clear demarcation and resumption of erythema above ankle extending to mid-shin.  No appreciable LAG/LAD.  Right calf with moderate swelling, left calf without appreciable swelling, no calf tenderness/palpable cords bilaterally   Vascular: Palpable, 2+ femoral, popliteal, DP pulses bilaterally.  1+ palpable left PT.  Faintly palpable right PT, confirmed with portable doppler - triphasic signal appreciated.    Musculoskeletal:  Diffuse weakness left UE/LE.  Normal strength with resisted right  foot and great toe PF/DF  Neuro/Psych:  Alert, oriented to time, place, and person     LABS:                        14.6   6.34  )-----------( 204      ( 10 Feb 2021 10:11 )             43.8     02-10    137  |  102  |  27<H>  ----------------------------<  361<H>  4.6   |  29  |  1.30    Ca    9.2      10 Feb 2021 10:11    TPro  8.1  /  Alb  3.9  /  TBili  0.5  /  DBili  x   /  AST  15  /  ALT  24  /  AlkPhos  147<H>  02-10    PT/INR - ( 10 Feb 2021 10:11 )   PT: 11.2 sec;   INR: 0.96 ratio    PTT - ( 10 Feb 2021 10:11 )  PTT:30.5 sec  Urinalysis Basic - ( 10 Feb 2021 12:02 )    Color: Pale Yellow / Appearance: Slightly Turbid / S.010 / pH: x  Gluc: x / Ketone: Negative  / Bili: Negative / Urobili: Negative   Blood: x / Protein: 30 mg/dL / Nitrite: Negative   Leuk Esterase: Negative / RBC: 6-10 /HPF / WBC x   Sq Epi: x / Non Sq Epi: Occasional / Bacteria: x  LIVER FUNCTIONS - ( 10 Feb 2021 10:11 )  Alb: 3.9 g/dL / Pro: 8.1 g/dL / ALK PHOS: 147 U/L / ALT: 24 U/L / AST: 15 U/L / GGT: x           RADIOLOGY & ADDITIONAL STUDIES:  EXAM:  US DPLX LWR EXT VEINS LTD RT                        PROCEDURE DATE:  02/10/2021    INTERPRETATION:  CLINICAL INFORMATION: Right middle toe infection  TECHNIQUE: Duplex sonography of the RIGHT LOWER extremity veins with color and spectral Doppler, with and without compression.  FINDINGS:  There is normal compressibility of the right common femoral, femoral and popliteal veins.  The contralateral common femoral vein is patent.  Doppler examination shows normal spontaneous and phasic flow.  No calf vein thrombosis is detected.  IMPRESSION:  No evidence of right lower extremity deep venous thrombosis.  SHAI MULLINS MD; Attending Radiologist  This document has been electronically signed. Feb 10 2021  1:11PM    EXAM:  PHYSIOL EXTREM LOW 3+ LEV BI                        PROCEDURE DATE:  10/19/2020    INTERPRETATION:  History:Bilateral calf ulcers. Type 2 diabetes. Hypertension with hyperlipidemia and diabetes.  Technique: Bilateral PHUONG/PVR  Comparison: None  Findings:  RIGHT  PHUONG: 1.2  Flow study: Good flow from the high thigh through the great toe.  LEFT  PHUONG: 1.2  Flow study: Good flow from the high thigh through the great toe.  Impression:  No evidence of large vessel hemodynamically significant lower extremity arterial disease at rest.  MAXINE BARCENAS M.D., ATTENDING RADIOLOGIST  This document has been electronically signed. 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  WBC count normal, with stable and non-suggestive VS.  Remains afebrile.  As per prior vascular physiologic studies, no evidence of hemodynamically significant lower extremity arterial disease - patient noted to have palpable pulses in the right foot.  No vascular objection to Podiatric surgical intervention.  No further vascular diagnostic testing or interventions are currently indicated.  At this time, there is little to no role for continued Vascular involvement - will sign-off and reconsult on PRN basis.  Discussed with Dr. Rizzo.

## 2021-02-10 NOTE — ED ADULT NURSE REASSESSMENT NOTE - NS ED NURSE REASSESS COMMENT FT1
Received a call from the lab [facility] and was advised they need an order for the tissue they received earlier today but has no order for the specimen; called Arturo MARTIN and requested to write cultured tissue and comments section right foot.

## 2021-02-10 NOTE — H&P ADULT - NSICDXPASTSURGICALHX_GEN_ALL_CORE_FT
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

## 2021-02-10 NOTE — CONSULT NOTE ADULT - SUBJECTIVE AND OBJECTIVE BOX
Patient is a 55y old  Male who presents with a chief complaint of osteomyelitis (10 Feb 2021 15:11)      HPI: 56 YO M with hx CKD s/p renal transplant, CVA (residual decreased sensation L side), DM, neuropathy, DVT, HLD, Hypertension ,Obesity, squamous cell carcinoma, presents to ED for redness/swelling R leg. Patient states 5 days ago he noticed swelling of his 3rd toe on R foot. 2 days later the swelling increased and an opening was formed which drained clear/red fluid. Patient called and made an appointment with Dr. Araiza at wound care for Friday, however this AM patient woke up and his entire RLE up to mid calf was edematous, erythematous, and painful. Patient decided to come to ED to be evaluated. Patient admitted for rule out osteomyelitis and is scheduled for OR tomorrow, , with Dr. Araiza for R 3rd distal digit amputation. Patient currently feels well. C/o some anxiety regarding procedure tomorrow. Denies any CP, palpitations, SOB, diaphoresis, lightheadedness. Patient regularly follows up with PCP and nephrology. Denies regularly seeing cardiologist. States in  he had a CVA, and a loop recorder was placed. No arrhythmia was ever detected and the battery  in 2018. Patient also had a DVT in  after he was bed bound 2/2 parvovirus. Was put on AC for 1 year and is no longer on AC.       PAST MEDICAL & SURGICAL HISTORY:  Recurrent squamous cell carcinoma of skin  nose, L arm  History of DVT (deep vein thrombosis)  Squamous cell carcinoma of skin of left ear  CVA (cerebral vascular accident)  Obesity (BMI 30-39.9)  Diabetic peripheral neuropathy  CKD (chronic kidney disease)  Diabetes mellitus  Hyperlipidemia  Hypertension  S/P kidney transplant  End-stage renal failure with renal transplant  2013  H/O foot surgery   - right foot - bone fracture - s/p ORIF and subsequent removal of hardware  Vasectomy status  s/p b/l  vasectomy  Ear disease  Left inner ear surgery  Toe infection   L 4th Toe surgery-amputation secondary to osteomyelitis      ECHO  FINDINGS: NA      MEDICATIONS  (STANDING):  aspirin 325 milliGRAM(s) Oral daily  atorvastatin 10 milliGRAM(s) Oral at bedtime  azaTHIOprine 100 milliGRAM(s) Oral daily  cefTRIAXone   IVPB 2000 milliGRAM(s) IV Intermittent every 24 hours  cloNIDine 0.1 milliGRAM(s) Oral two times a day  dextrose 40% Gel 15 Gram(s) Oral once  dextrose 5%. 1000 milliLiter(s) (50 mL/Hr) IV Continuous <Continuous>  dextrose 5%. 1000 milliLiter(s) (100 mL/Hr) IV Continuous <Continuous>  dextrose 50% Injectable 25 Gram(s) IV Push once  dextrose 50% Injectable 12.5 Gram(s) IV Push once  dextrose 50% Injectable 25 Gram(s) IV Push once  famotidine    Tablet 20 milliGRAM(s) Oral daily  gabapentin 300 milliGRAM(s) Oral two times a day  glucagon  Injectable 1 milliGRAM(s) IntraMuscular once  heparin   Injectable 5000 Unit(s) IV Push once  hydrALAZINE 75 milliGRAM(s) Oral three times a day  insulin lispro (ADMELOG) corrective regimen sliding scale   SubCutaneous three times a day before meals  insulin lispro (ADMELOG) corrective regimen sliding scale   SubCutaneous at bedtime  losartan 25 milliGRAM(s) Oral daily  metoprolol tartrate Oral Tab/Cap - Peds 75 milliGRAM(s) Oral two times a day  tacrolimus 1.5 milliGRAM(s) Oral daily  trimethoprim   80 mG/sulfamethoxazole 400 mG 1 Tablet(s) Oral daily  vancomycin  IVPB 1000 milliGRAM(s) IV Intermittent every 12 hours    MEDICATIONS  (PRN):      FAMILY HISTORY:  FH: skin cancer    Family history of diabetes mellitus (DM)  Denies Family history of CAD or early MI      Constitutional: denies fever, chills  HEENT: denies blurry vision, difficulty hearing  Respiratory: denies SOB, WOOTEN, cough  Cardiovascular: denies CP, palpitations, orthopnea, LE edema  Gastrointestinal: denies nausea, vomiting, abdominal pain  Genitourinary: denies urinary changes  Skin: +erythematous RLE with mild edema, 3rd toe R foot with wound, Denies itching  Neurologic: +decreased sensation L side chronic, denies headache, weakness, dizziness   Hematology/Oncology: denies bleeding, easy bruising        SOCIAL HISTORY:  No tobacco, Alcohol or Drug use    Vital Signs Last 24 Hrs  T(C): 36.6 (10 Feb 2021 15:20), Max: 36.7 (10 Feb 2021 12:34)  T(F): 97.8 (10 Feb 2021 15:20), Max: 98 (10 Feb 2021 12:34)  HR: 58 (10 Feb 2021 15:20) (58 - 76)  BP: 162/84 (10 Feb 2021 15:20) (137/75 - 194/97)  BP(mean): --  RR: 17 (10 Feb 2021 15:20) (16 - 18)  SpO2: 98% (10 Feb 2021 15:20) (96% - 98%)    Physical Exam:  General: Well developed, well nourished, NAD  HEENT: NCAT, EOMI bl, moist mucous membranes   Neck: Supple, nontender  Neurology: A&Ox3, answering questions appropriately   Respiratory: CTA B/L, No W/R/R  CV: RRR, +S1/S2, no murmurs  Abdominal: Soft, NT, ND +BSx4  Extremities: +erythema with 2+ pitting edema RLE, warm to touch, R foot wrapped in sterile gauze, no edema or cyanosis of LLE  MSK: Normal ROM, no joint erythema or warmth, no joint swelling   Heme: No obvious ecchymosis or petechiae       ECG: Sinus bradycardia with 1st degree AV block, rate 59    I&O's Detail      LABS:                        14.6   6.34  )-----------( 204      ( 10 Feb 2021 10:11 )             43.8     02-10    137  |  102  |  27<H>  ----------------------------<  361<H>  4.6   |  29  |  1.30    Ca    9.2      10 Feb 2021 10:11    TPro  8.1  /  Alb  3.9  /  TBili  0.5  /  DBili  x   /  AST  15  /  ALT  24  /  AlkPhos  147<H>  02-10        PT/INR - ( 10 Feb 2021 10:11 )   PT: 11.2 sec;   INR: 0.96 ratio         PTT - ( 10 Feb 2021 10:11 )  PTT:30.5 sec  Urinalysis Basic - ( 10 Feb 2021 12:02 )    Color: Pale Yellow / Appearance: Slightly Turbid / S.010 / pH: x  Gluc: x / Ketone: Negative  / Bili: Negative / Urobili: Negative   Blood: x / Protein: 30 mg/dL / Nitrite: Negative   Leuk Esterase: Negative / RBC: 6-10 /HPF / WBC x   Sq Epi: x / Non Sq Epi: Occasional / Bacteria: x      I&O's Summary    BNP  RADIOLOGY & ADDITIONAL STUDIES: Patient is a 55y old  Male who presents with a chief complaint of osteomyelitis (10 Feb 2021 15:11)      HPI: 56 YO M with hx CKD s/p renal transplant, CVA (residual decreased sensation L side), DM, neuropathy, DVT, HLD, Hypertension ,Obesity, squamous cell carcinoma, presents to ED for redness/swelling R leg. Patient states 5 days ago he noticed swelling of his 3rd toe on R foot. 2 days later the swelling increased and an opening was formed which drained clear/red fluid. Patient called and made an appointment with Dr. Araiza at wound care for Friday, however this AM patient woke up and his entire RLE up to mid calf was edematous, erythematous, and painful. Patient decided to come to ED to be evaluated. Patient admitted for rule out osteomyelitis and is scheduled for OR tomorrow, , with Dr. Araiza for R 3rd distal digit amputation. Patient currently feels well. C/o some anxiety regarding procedure tomorrow. Denies any CP, palpitations, SOB, diaphoresis, lightheadedness. Patient regularly follows up with PCP and nephrology. Denies regularly seeing cardiologist. States in  he had a CVA, afterwards a loop recorder was placed. No arrhythmia was ever detected and the battery  in . Patient also had a DVT in  after he was bed bound 2/2 parvovirus. Was put on AC for 1 year and is no longer on AC. Patient reports he is able to perform >4 METS without issue. Patient states in August he underwent a toe amputation 2/2 osteomyelitis and tolerated the procedure well, no issues with anesthesia.      PAST MEDICAL & SURGICAL HISTORY:  Recurrent squamous cell carcinoma of skin  nose, L arm  History of DVT (deep vein thrombosis)  Squamous cell carcinoma of skin of left ear  CVA (cerebral vascular accident)  Obesity (BMI 30-39.9)  Diabetic peripheral neuropathy  CKD (chronic kidney disease)  Diabetes mellitus  Hyperlipidemia  Hypertension  S/P kidney transplant  End-stage renal failure with renal transplant  2013  H/O foot surgery   - right foot - bone fracture - s/p ORIF and subsequent removal of hardware  Vasectomy status  s/p b/l  vasectomy  Ear disease  Left inner ear surgery  Toe infection   L 4th Toe surgery-amputation secondary to osteomyelitis      ECHO  FINDINGS: NA      MEDICATIONS  (STANDING):  aspirin 325 milliGRAM(s) Oral daily  atorvastatin 10 milliGRAM(s) Oral at bedtime  azaTHIOprine 100 milliGRAM(s) Oral daily  cefTRIAXone   IVPB 2000 milliGRAM(s) IV Intermittent every 24 hours  cloNIDine 0.1 milliGRAM(s) Oral two times a day  dextrose 40% Gel 15 Gram(s) Oral once  dextrose 5%. 1000 milliLiter(s) (50 mL/Hr) IV Continuous <Continuous>  dextrose 5%. 1000 milliLiter(s) (100 mL/Hr) IV Continuous <Continuous>  dextrose 50% Injectable 25 Gram(s) IV Push once  dextrose 50% Injectable 12.5 Gram(s) IV Push once  dextrose 50% Injectable 25 Gram(s) IV Push once  famotidine    Tablet 20 milliGRAM(s) Oral daily  gabapentin 300 milliGRAM(s) Oral two times a day  glucagon  Injectable 1 milliGRAM(s) IntraMuscular once  heparin   Injectable 5000 Unit(s) IV Push once  hydrALAZINE 75 milliGRAM(s) Oral three times a day  insulin lispro (ADMELOG) corrective regimen sliding scale   SubCutaneous three times a day before meals  insulin lispro (ADMELOG) corrective regimen sliding scale   SubCutaneous at bedtime  losartan 25 milliGRAM(s) Oral daily  metoprolol tartrate Oral Tab/Cap - Peds 75 milliGRAM(s) Oral two times a day  tacrolimus 1.5 milliGRAM(s) Oral daily  trimethoprim   80 mG/sulfamethoxazole 400 mG 1 Tablet(s) Oral daily  vancomycin  IVPB 1000 milliGRAM(s) IV Intermittent every 12 hours    MEDICATIONS  (PRN):      FAMILY HISTORY:  FH: skin cancer    Family history of diabetes mellitus (DM)  Denies Family history of CAD or early MI      Constitutional: denies fever, chills  HEENT: denies blurry vision, difficulty hearing  Respiratory: denies SOB, WOOTEN, cough  Cardiovascular: denies CP, palpitations, orthopnea, LE edema  Gastrointestinal: denies nausea, vomiting, abdominal pain  Genitourinary: denies urinary changes  Skin: +erythematous RLE with mild edema, 3rd toe R foot with wound  Neurologic: +decreased sensation L side chronic, denies headache, weakness, dizziness   Hematology/Oncology: denies bleeding, easy bruising        SOCIAL HISTORY:  No tobacco, Alcohol or Drug use    Vital Signs Last 24 Hrs  T(C): 36.6 (10 Feb 2021 15:20), Max: 36.7 (10 Feb 2021 12:34)  T(F): 97.8 (10 Feb 2021 15:20), Max: 98 (10 Feb 2021 12:34)  HR: 58 (10 Feb 2021 15:20) (58 - 76)  BP: 162/84 (10 Feb 2021 15:20) (137/75 - 194/97)  BP(mean): --  RR: 17 (10 Feb 2021 15:20) (16 - 18)  SpO2: 98% (10 Feb 2021 15:20) (96% - 98%)    Physical Exam:  General: Well developed, well nourished, NAD  HEENT: NCAT, EOMI bl, moist mucous membranes   Neck: Supple, nontender  Neurology: A&Ox3, answering questions appropriately   Respiratory: CTA B/L, No W/R/R  CV: RRR, +S1/S2, no murmurs  Abdominal: Soft, NT, ND +BSx4  Extremities: +erythema with 2+ pitting edema RLE, warm to touch, R foot wrapped in sterile gauze, no edema or cyanosis of LLE  MSK: Normal ROM, no joint erythema or warmth, no joint swelling   Heme: No obvious ecchymosis or petechiae       ECG: Sinus bradycardia with 1st degree AV block, rate 59    I&O's Detail      LABS:                        14.6   6.34  )-----------( 204      ( 10 Feb 2021 10:11 )             43.8     02-10    137  |  102  |  27<H>  ----------------------------<  361<H>  4.6   |  29  |  1.30    Ca    9.2      10 Feb 2021 10:11    TPro  8.1  /  Alb  3.9  /  TBili  0.5  /  DBili  x   /  AST  15  /  ALT  24  /  AlkPhos  147<H>  02-10        PT/INR - ( 10 Feb 2021 10:11 )   PT: 11.2 sec;   INR: 0.96 ratio         PTT - ( 10 Feb 2021 10:11 )  PTT:30.5 sec  Urinalysis Basic - ( 10 Feb 2021 12:02 )    Color: Pale Yellow / Appearance: Slightly Turbid / S.010 / pH: x  Gluc: x / Ketone: Negative  / Bili: Negative / Urobili: Negative   Blood: x / Protein: 30 mg/dL / Nitrite: Negative   Leuk Esterase: Negative / RBC: 6-10 /HPF / WBC x   Sq Epi: x / Non Sq Epi: Occasional / Bacteria: x      I&O's Summary    BNP  RADIOLOGY & ADDITIONAL STUDIES: NA

## 2021-02-10 NOTE — CONSULT NOTE ADULT - PROBLEM SELECTOR RECOMMENDATION 4
Admit for septic workup and ID evaluation,send blood and urine cx,serial lactate levels,monitor vitals closley,ivfs hydration,monitor urine output and renal profile,iv abx as per id cons  cefTRIAXone   IVPB 2000 milliGRAM(s) IV Intermittent every 24 hours

## 2021-02-10 NOTE — H&P ADULT - PROBLEM SELECTOR PLAN 5
squamous cell carcinoma  continue tacrolimus 1.5 mg BID Maksim CVA (2015) - residual deficits of loss of pain and temperature sensation in left arm and right face  -continue home gabapentin for residual neuropathic facial pain.  continue gabapentin 300 mg BID

## 2021-02-10 NOTE — H&P ADULT - NSHPSOCIALHISTORY_GEN_ALL_CORE
, lives with wife and 3 kids, retired, social drinker, denies tobacco or recreational drug use, independent in ADLs, walks without assistance

## 2021-02-10 NOTE — ED PROVIDER NOTE - CARE PLAN
Principal Discharge DX:	Osteomyelitis of right foot, unspecified type   Principal Discharge DX:	Osteomyelitis of right foot, unspecified type  Secondary Diagnosis:	Hyperglycemia

## 2021-02-10 NOTE — H&P ADULT - GASTROINTESTINAL DETAILS
normal/soft/nontender/no distention/no masses palpable/bowel sounds normal/no rebound tenderness/no guarding

## 2021-02-10 NOTE — H&P ADULT - PROBLEM SELECTOR PLAN 4
Hypertension.    BP initially 194/97 in ER, pt did not take his home meds today  -BP improved after giving home meds  -continue home clonidine .1 mg BID , and metoprolol 75 mg BID and losartan 25 mg qD Hypertension.    BP initially 194/97 in ER, pt did not take his home meds today  -BP improved to 137/75  -continue home clonidine .1 mg BID , and metoprolol 75 mg BID and losartan 25 mg qD

## 2021-02-10 NOTE — CONSULT NOTE ADULT - SUBJECTIVE AND OBJECTIVE BOX
Patient is a 55y Male whom presented to the hospital with s/p End-stage renal failure with renal transplant  2013      PAST MEDICAL & SURGICAL HISTORY:  Recurrent squamous cell carcinoma of skin  nose, L arm    History of DVT (deep vein thrombosis)    Squamous cell carcinoma of skin of left ear    CVA (cerebral vascular accident)    Obesity (BMI 30-39.9)    Diabetic peripheral neuropathy    CKD (chronic kidney disease)    Diabetes mellitus    Hyperlipidemia    Hypertension    S/P kidney transplant    End-stage renal failure with renal transplant  2013    H/O foot surgery  2005 - right foot - bone fracture - s/p ORIF and subsequent removal of hardware    Vasectomy status  s/p b/l  vasectomy    Ear disease  Left inner ear surgery    Toe infection   L 4th Toe surgery-amputation secondary to osteomyelitis        MEDICATIONS  (STANDING):  atorvastatin 10 milliGRAM(s) Oral at bedtime  azaTHIOprine 100 milliGRAM(s) Oral daily  cefTRIAXone   IVPB 2000 milliGRAM(s) IV Intermittent every 24 hours  cloNIDine 0.1 milliGRAM(s) Oral two times a day  dextrose 40% Gel 15 Gram(s) Oral once  dextrose 5%. 1000 milliLiter(s) (50 mL/Hr) IV Continuous <Continuous>  dextrose 5%. 1000 milliLiter(s) (100 mL/Hr) IV Continuous <Continuous>  dextrose 50% Injectable 25 Gram(s) IV Push once  dextrose 50% Injectable 12.5 Gram(s) IV Push once  dextrose 50% Injectable 25 Gram(s) IV Push once  famotidine    Tablet 20 milliGRAM(s) Oral daily  gabapentin 300 milliGRAM(s) Oral two times a day  glucagon  Injectable 1 milliGRAM(s) IntraMuscular once  heparin   Injectable 5000 Unit(s) IV Push once  hydrALAZINE 25 milliGRAM(s) Oral three times a day  insulin glargine Injectable (LANTUS) 25 Unit(s) SubCutaneous at bedtime  insulin lispro (ADMELOG) corrective regimen sliding scale   SubCutaneous three times a day before meals  insulin lispro (ADMELOG) corrective regimen sliding scale   SubCutaneous at bedtime  insulin lispro Injectable (ADMELOG) 8 Unit(s) SubCutaneous three times a day before meals  losartan 25 milliGRAM(s) Oral daily  metoprolol tartrate Oral Tab/Cap - Peds 75 milliGRAM(s) Oral two times a day  tacrolimus 1.5 milliGRAM(s) Oral two times a day  trimethoprim   80 mG/sulfamethoxazole 400 mG 1 Tablet(s) Oral daily      Allergies    No Known Allergies    Intolerances        SOCIAL HISTORY:  Denies ETOh,Smoking,     FAMILY HISTORY:  FH: skin cancer    Family history of diabetes mellitus (DM)        REVIEW OF SYSTEMS:    CONSTITUTIONAL: No weakness, fevers or chills  EYES/ENT: No visual changes;  no throat pain   NECK: No pain or stiffness  RESPIRATORY: No cough, wheezing, hemoptysis; No shortness of breath  CARDIOVASCULAR: No chest pain or palpitations  GASTROINTESTINAL: No abdominal or epigastric pain. No nausea, vomiting,     No diarrhea or constipation. No melena   GENITOURINARY: No dysuria, frequency or hematuria  NEUROLOGICAL: No numbness or weakness  SKIN: dry      VITAL:  T(C): , Max: 36.7 (02-10-21 @ 12:34)  T(F): , Max: 98 (02-10-21 @ 12:34)  HR: 60 (02-10-21 @ 17:18)  BP: 177/91 (02-10-21 @ 17:18)  BP(mean): --  RR: 17 (02-10-21 @ 17:18)  SpO2: 98% (02-10-21 @ 17:18)  Wt(kg): --    I and O's:    Height (cm): 185.4 (02-10 @ 09:01)  Weight (kg): 142.9 (02-10 @ 09:01)  BMI (kg/m2): 41.6 (02-10 @ 09:01)  BSA (m2): 2.61 (02-10 @ 09:01)    PHYSICAL EXAM:    Constitutional: NAD  HEENT: conjunctive   clear   Neck:  No JVD  Respiratory: CTAB  Cardiovascular: S1 and S2  Gastrointestinal: BS+, soft, NT/ND  Extremities: No peripheral edema  Neurological: A/O x 3, no focal deficits  Psychiatric: Normal mood, normal affect  : No Benitez  Skin: No rashes  Access: Not applicable    LABS:                        14.6   6.34  )-----------( 204      ( 10 Feb 2021 10:11 )             43.8     02-10    137  |  102  |  27<H>  ----------------------------<  361<H>  4.6   |  29  |  1.30    Ca    9.2      10 Feb 2021 10:11    TPro  8.1  /  Alb  3.9  /  TBili  0.5  /  DBili  x   /  AST  15  /  ALT  24  /  AlkPhos  147<H>  02-10      Urine Studies:  Urinalysis Basic - ( 10 Feb 2021 12:02 )    Color: Pale Yellow / Appearance: Slightly Turbid / S.010 / pH: x  Gluc: x / Ketone: Negative  / Bili: Negative / Urobili: Negative   Blood: x / Protein: 30 mg/dL / Nitrite: Negative   Leuk Esterase: Negative / RBC: 6-10 /HPF / WBC x   Sq Epi: x / Non Sq Epi: Occasional / Bacteria: x            RADIOLOGY & ADDITIONAL STUDIES:                   Patient is a 55y Male whom presented to the hospital with s/p End-stage renal failure with renal transplant  2013      PAST MEDICAL & SURGICAL HISTORY:  Recurrent squamous cell carcinoma of skin  nose, L arm    History of DVT (deep vein thrombosis)    Squamous cell carcinoma of skin of left ear    CVA (cerebral vascular accident)    Obesity (BMI 30-39.9)    Diabetic peripheral neuropathy    CKD (chronic kidney disease)    Diabetes mellitus    Hyperlipidemia    Hypertension    S/P kidney transplant    End-stage renal failure with renal transplant  2013    H/O foot surgery  2005 - right foot - bone fracture - s/p ORIF and subsequent removal of hardware    Vasectomy status  s/p b/l  vasectomy    Ear disease  Left inner ear surgery    Toe infection   L 4th Toe surgery-amputation secondary to osteomyelitis        MEDICATIONS  (STANDING):  atorvastatin 10 milliGRAM(s) Oral at bedtime  azaTHIOprine 100 milliGRAM(s) Oral daily  cefTRIAXone   IVPB 2000 milliGRAM(s) IV Intermittent every 24 hours  cloNIDine 0.1 milliGRAM(s) Oral two times a day  dextrose 40% Gel 15 Gram(s) Oral once  dextrose 5%. 1000 milliLiter(s) (50 mL/Hr) IV Continuous <Continuous>  dextrose 5%. 1000 milliLiter(s) (100 mL/Hr) IV Continuous <Continuous>  dextrose 50% Injectable 25 Gram(s) IV Push once  dextrose 50% Injectable 12.5 Gram(s) IV Push once  dextrose 50% Injectable 25 Gram(s) IV Push once  famotidine    Tablet 20 milliGRAM(s) Oral daily  gabapentin 300 milliGRAM(s) Oral two times a day  glucagon  Injectable 1 milliGRAM(s) IntraMuscular once  heparin   Injectable 5000 Unit(s) IV Push once  hydrALAZINE 25 milliGRAM(s) Oral three times a day  insulin glargine Injectable (LANTUS) 25 Unit(s) SubCutaneous at bedtime  insulin lispro (ADMELOG) corrective regimen sliding scale   SubCutaneous three times a day before meals  insulin lispro (ADMELOG) corrective regimen sliding scale   SubCutaneous at bedtime  insulin lispro Injectable (ADMELOG) 8 Unit(s) SubCutaneous three times a day before meals  losartan 25 milliGRAM(s) Oral daily  metoprolol tartrate Oral Tab/Cap - Peds 75 milliGRAM(s) Oral two times a day  tacrolimus 1.5 milliGRAM(s) Oral two times a day  trimethoprim   80 mG/sulfamethoxazole 400 mG 1 Tablet(s) Oral daily      Allergies    No Known Allergies    Intolerances        SOCIAL HISTORY:  Denies ETOh,Smoking,     FAMILY HISTORY:  FH: skin cancer    Family history of diabetes mellitus (DM)        REVIEW OF SYSTEMS:  CONSTITUTIONAL: No weakness, fevers or chills  NECK: No pain or stiffness  RESPIRATORY: No cough, wheezing, hemoptysis; No shortness of breath  CARDIOVASCULAR: No chest pain or palpitations  GASTROINTESTINAL: No abdominal or epigastric pain. No nausea, vomiting,     No diarrhea or constipation. No melena   GENITOURINARY: No dysuria, frequency or hematuria      VITAL:  T(C): , Max: 36.7 (02-10-21 @ 12:34)  T(F): , Max: 98 (02-10-21 @ 12:34)  HR: 60 (02-10-21 @ 17:18)  BP: 177/91 (02-10-21 @ 17:18)  BP(mean): --  RR: 17 (02-10-21 @ 17:18)  SpO2: 98% (02-10-21 @ 17:18)  Wt(kg): --    I and O's:    Height (cm): 185.4 (02-10 @ 09:01)  Weight (kg): 142.9 (02-10 @ 09:01)  BMI (kg/m2): 41.6 (02-10 @ 09:01)  BSA (m2): 2.61 (02-10 @ 09:01)    PHYSICAL EXAM:    Constitutional: NAD  HEENT: conjunctive   clear   Neck:  No JVD  Respiratory: CTAB  Cardiovascular: S1 and S2  Gastrointestinal: BS+, soft,   Neurological: A/O x 3, no focal deficits  Psychiatric: Normal mood, normal affect  : No Benitez  Skin:  R foot 3rd toe infection, r shin redness  Access: Not applicable    LABS:                        14.6   6.34  )-----------( 204      ( 10 Feb 2021 10:11 )             43.8     02-10    137  |  102  |  27<H>  ----------------------------<  361<H>  4.6   |  29  |  1.30    Ca    9.2      10 Feb 2021 10:11    TPro  8.1  /  Alb  3.9  /  TBili  0.5  /  DBili  x   /  AST  15  /  ALT  24  /  AlkPhos  147<H>  02-10      Urine Studies:  Urinalysis Basic - ( 10 Feb 2021 12:02 )    Color: Pale Yellow / Appearance: Slightly Turbid / S.010 / pH: x  Gluc: x / Ketone: Negative  / Bili: Negative / Urobili: Negative   Blood: x / Protein: 30 mg/dL / Nitrite: Negative   Leuk Esterase: Negative / RBC: 6-10 /HPF / WBC x   Sq Epi: x / Non Sq Epi: Occasional / Bacteria: x            RADIOLOGY & ADDITIONAL STUDIES:

## 2021-02-10 NOTE — H&P ADULT - PROBLEM SELECTOR PLAN 8
neuropathy  continue gabapentin 300 mg BID dvt ppx heparin 1x dose today, hold DVT ppx after midnight     IMPROVE VTE Individual Risk Assessment        RISK                                                          Points  [  ] Previous VTE                                                3  [  ] Thrombophilia                                             2  [  ] Lower limb paralysis                                   2        (unable to hold up >15 seconds)    [  ] Current Cancer                                            2         (within 6 months)  [  ] Immobilization > 24 hrs                              1  [  ] ICU/CCU stay > 24 hours                            1  [  ] Age > 60                                                    1  IMPROVE VTE Score _____0____

## 2021-02-10 NOTE — ED PROVIDER NOTE - PROGRESS NOTE DETAILS
Podiatry at bedside, plan for OR tomorrow , pt to be admitted, call placed to Dr Castro for admission Patient back from radiology, awaiting podiatry consult, pt aware of results. Took all BP meds he was due for this AM and BP much improved. Comfortable at this time.

## 2021-02-10 NOTE — H&P ADULT - PROBLEM SELECTOR PLAN 1
-4 day hx of R 3rd distal phalanx wound, afebrile, no leukocytosis   -admit to [facility]  -s/p vancomycin, rocephin, IV fluid bolus in ED  -continue vancomycin 1g and rocephin 2g IV    -XR R Foot: cortical erosions of right 3rd distal phalanx, however, no discrete om   -Podiatry Dr. Araiza consulted; scheduled for OR tomorrow 2/11 for right 3rd distal digit amputation  -Right 3rd digit wound noted with purulent drainage, tissue sent for cx by podiatry  -sharp excisional debridement of right third digit wound, b/l hallux HPK lesion performed by podiatry  -NPO past midnight   -pt is medically optimized, intermediate risk for intermediate risk procedure, able to achieve 4 METS  -pending cardiac clearance -4 day hx of R 3rd distal phalanx wound, afebrile, no leukocytosis   -admit to [facility]  -s/p vancomycin, rocephin, IV fluid bolus in ED  -continue vancomycin 1g and rocephin 2g IV    -XR R Foot: cortical erosions of right 3rd distal phalanx, however, no discrete om   -Podiatry Dr. Araiza consulted; scheduled for OR tomorrow 2/11 for right 3rd distal digit amputation  -Right 3rd digit wound noted with purulent drainage, tissue sent for cx by podiatry  -sharp excisional debridement of right third digit wound, b/l hallux HPK lesion performed by podiatry  -NPO past midnight   -pt is medically optimized, intermediate risk for intermediate risk procedure, able to achieve 4 METS  -cleared by vascular surgery for planned procedure   -pending cardiac clearance -4 day hx of R 3rd distal phalanx wound with OM , afebrile, no leukocytosis   -admit to [facility]  -s/p vancomycin, Rocephin, IV fluid bolus in ED  -continue vancomycin 1g and rocephin 2g IV    -XR R Foot: cortical erosions of right 3rd distal phalanx, however, no discrete om   -Podiatry Dr. Araiza consulted; scheduled for OR tomorrow 2/11 for right 3rd distal digit amputation  -Right 3rd digit wound noted with purulent drainage, tissue sent for cx by podiatry  -sharp excisional debridement of right third digit wound, b/l hallux HPK lesion performed by podiatry  -NPO past midnight   -pt is medically optimized, intermediate risk for intermediate risk procedure, able to achieve 4 METS  -cleared by vascular surgery for planned procedure   -pending cardiac clearance - Rt foot  3rd distal phalanx  open wound  probe to bone  c/w with OM   -, afebrile, no leukocytosis   -admit to [facility]  -s/p vancomycin, Rocephin, IV fluid bolus in ED  -continue Rocephin 2g IV daily Vanco -stopped,   -Performed sharp excisional debridement of right third digit wound, b/l hallux HPK lesion with a sterile #15 blade to the level of healthy subcutaneous tissue.  Right 3rd digit wound noted with purulent drainage, sent tissue for culture.    Pt booked for the OR tomorrow 2/11 for right 3rd distal digit amputation with Dr. Araiza.  Please acquire medical and cardiac clearance.  Vascular consulted for clearance and possible workup.     -XR R Foot: cortical erosions of right 3rd distal phalanx, however, no discrete om   -Podiatry Dr. Araiza consulted; scheduled for OR tomorrow 2/11 for right 3rd distal digit amputation  -Right 3rd digit wound noted with purulent drainage, tissue sent for cx by podiatry  -sharp excisional debridement of right third digit wound, b/l hallux HPK lesion performed by podiatry  -NPO past midnight   -pt is medically optimized, intermediate risk for intermediate risk procedure, able to achieve 4 METS  -cleared by vascular surgery for planned procedure   -pending cardiac clearance

## 2021-02-10 NOTE — H&P ADULT - PROBLEM SELECTOR PLAN 10
dvt ppx heparin 1x dose today, hold DVT ppx after midnight     IMPROVE VTE Individual Risk Assessment        RISK                                                          Points  [  ] Previous VTE                                                3  [  ] Thrombophilia                                             2  [  ] Lower limb paralysis                                   2        (unable to hold up >15 seconds)    [  ] Current Cancer                                            2         (within 6 months)  [  ] Immobilization > 24 hrs                              1  [  ] ICU/CCU stay > 24 hours                            1  [  ] Age > 60                                                    1  IMPROVE VTE Score _____0____

## 2021-02-10 NOTE — H&P ADULT - EXTREMITIES COMMENTS
Diabetic skin changed b/l lower Ext , Rt Lower Ext swelling, Erythema, warm, Rt Foot 3 rd toe open wound with drainage  Np Necrotic tissues, Neuropathy

## 2021-02-10 NOTE — H&P ADULT - HISTORY OF PRESENT ILLNESS
In the ED:   VS: T 98F, HR 76, /75, RR 16, SpO2 96% on RA  Labs: glucose 361, alk phos 147, UA neg, covid-19 neg  US duplex venous LE: neg for DVT   EKG: Sinus bradycardia with 1st degree AV block (unchanged from 8/2020)  XR R foot: cortical erosions of right 3rd distal phalanx  CXR: pending official read     S/p Vancomycin 1g IV, Rocephin 2g IV, 1L NS IV bolus   54 YO M with hx CKD s/p renal transplant, CVA, DM, neuropathy, DVT, HLD, Hypertension ,Obesity, Squamous cell carcinoma, presents to ED for redness, swelling R leg.        In the ED:   VS: T 98F, HR 76, /75, RR 16, SpO2 96% on RA  Labs: glucose 361, alk phos 147, UA neg, covid-19 neg  US duplex venous LE: neg for DVT   EKG: Sinus bradycardia with 1st degree AV block (unchanged from 8/2020)  XR R foot: cortical erosions of right 3rd distal phalanx  CXR: pending official read     S/p Vancomycin 1g IV, Rocephin 2g IV, 1L NS IV bolus   54 YO M with hx CKD s/p renal transplant, CVA, DM, neuropathy, DVT, HLD, Hypertension ,Obesity, squamous cell carcinoma, presents to ED for redness/swelling R leg. Patient states 3 days ago, he first noticed his toe nail on the R 3rd digit starting to "lift up" from a "small pimple" underneath. States there was also clear/red oozing. Patient made an appointment with Dr. Araiza for Friday of this week. Yesterday patient developed redness and swelling in the R lower leg which prompted him to come to the ED. Patient has peripheral neuropathy and states he has no pain in his legs. Denies any fever, chills, SOB, CP, N/V.     Of note, patient       In the ED:   VS: T 98F, HR 76, /75, RR 16, SpO2 96% on RA  Labs: glucose 361, alk phos 147, UA neg, covid-19 neg  US duplex venous LE: neg for DVT   EKG: Sinus bradycardia with 1st degree AV block (unchanged from 8/2020)  XR R foot: cortical erosions of right 3rd distal phalanx  CXR: pending official read     S/p Vancomycin 1g IV, Rocephin 2g IV, 1L NS IV bolus   56 YO M with hx CKD s/p renal transplant, CVA, DM, neuropathy, DVT, HLD, Hypertension ,Obesity, squamous cell carcinoma, presents to ED for redness/swelling R leg. Patient states 3 days ago, he first noticed his toe nail on the R 3rd digit starting to "lift up" from a "small pimple" underneath. States there was also clear/red oozing. Patient made an appointment with Dr. Araiza for Friday of this week. Yesterday patient developed redness and swelling in the R lower leg and had blood sugars in the 300s which prompted him to come to the ED. Patient has peripheral neuropathy and states he has no pain in his legs. Denies any fever, chills, SOB, CP, N/V.     Of note, patient was admitted to [facility] last August for osteomyelitis and had left 3rd toe distal digit partial amputation and right foot distal digit 2nd digit on 8/27/20.       In the ED:   VS: T 98F, HR 76, /75, RR 16, SpO2 96% on RA  Labs: glucose 361, alk phos 147, UA neg, covid-19 neg  US duplex venous LE: neg for DVT   EKG: Sinus bradycardia with 1st degree AV block (unchanged from 8/2020)  XR R foot: cortical erosions of right 3rd distal phalanx  CXR: pending official read     S/p Vancomycin 1g IV, Rocephin 2g IV, 1L NS IV bolus

## 2021-02-10 NOTE — ED PROVIDER NOTE - PHYSICAL EXAMINATION
redness and warmth noted to R shin, TTP to R posterior calf, multiple old R toe partial amputations noted, 3rd digit with scabbed lesion with overlying crust and area of necrosis, no tenderness to palpation no drainage noted, 2+ pedal pulses bilat, no overlying redness or warmth to R foot/ toe

## 2021-02-10 NOTE — H&P ADULT - PROBLEM SELECTOR PLAN 7
Renal transplant recipient, s/p right renal transplant for CKD (2013)  -continue home azathioprine 100 mg qD and tacrolimus 1.5 mg BID   -continue home SMX -80 mg qD for chronic UTI ppx -chronic, on lovastatin 40 mg qD at home, continue  -DASH/TLC diet, consistent carb

## 2021-02-10 NOTE — H&P ADULT - NSICDXPASTMEDICALHX_GEN_ALL_CORE_FT
PAST MEDICAL HISTORY:  CKD (chronic kidney disease)     CVA (cerebral vascular accident)     Diabetes mellitus     Diabetic peripheral neuropathy     History of DVT (deep vein thrombosis)     Hyperlipidemia     Hypertension     Obesity (BMI 30-39.9)     Recurrent squamous cell carcinoma of skin nose, L arm    Squamous cell carcinoma of skin of left ear      PAST MEDICAL HISTORY:  CKD (chronic kidney disease) Renal Transplant 2013 at [facility]    CVA (cerebral vascular accident)     Diabetes mellitus     Diabetic peripheral neuropathy     History of DVT (deep vein thrombosis)     Hyperlipidemia     Hypertension     Obesity (BMI 30-39.9)     Recurrent squamous cell carcinoma of skin nose, L arm    Squamous cell carcinoma of skin of left ear

## 2021-02-10 NOTE — ED PROVIDER NOTE - OBJECTIVE STATEMENT
55 y.o M with hx CKD s/p renal transplant, CVA, DM, neuropathy, DVT, HLD, Hypertension ,Obesity,  Squamous cell carcinoma, presents to ED for c.o redness swelling to R lag worse x 2 days. No fever or chills. No n/v/d, cough, CP, ha dizziness, SOB. Has hx multiple toe amputations secondary to osteomyelitis. Was admitted to [facility] in august 2002 for same. States for the last 2-3 days noted drainage form right third toe, has appt on Friday with Dr Araiza but got worse today so came in. No other complaints.   pt htn upon arrival, did not take meds today   PCP - Dr. Kobe Miranda  Kidney Transplant - Dr. Navarro, Dr. Neftali IKMBROUGH Weskan  Nephrologist - Dr. Weber  Podiatrist - Dr. Pedersen  Neurologist - Dr. Claire  Endocrinologist - Dr. Garcia

## 2021-02-10 NOTE — ED ADULT NURSE NOTE - OBJECTIVE STATEMENT
Received pt in bed alert and oriented x4. Received pt in bed alert and oriented x4.  C/O right foot pain.  Pt has redness and swelling Friday.  Pt noticed getting worse on Monday night.  Pt has pain to site.  Was sent in from Dr valentine to come to the ER.  Hx of kidney transplant 2013.  Pt also hx of amputation on right foot second toe tip.  Pt denies fevers, sob, chest pain.

## 2021-02-10 NOTE — ED PROVIDER NOTE - CLINICAL SUMMARY MEDICAL DECISION MAKING FREE TEXT BOX
56 y/o M with hx DM, CKD s/p kidney transplant, presents to ED for c/o swelling and redness to R leg worse x 2 days, concerned about foot infection, mult open ulcers to R foot, no fever or chills, also with elevated blood lguocses over th elast day or so, plan= labs, to check for DKA , sepsiss , blood cultures xray r foot to r.o free air, podiatry consult, abx and likely abdmit

## 2021-02-10 NOTE — H&P ADULT - PROBLEM SELECTOR PLAN 3
DM type 1  -patient is on insulin pump 3 units/hr basal rate x 24 hrs, uses humalog: will hold insulin pump for now as patient's site got disconnected when changing his clothes today (on prior admission had kept pump at 70% basal rate ie 2.1 units/hr x 24 hrs)  -low dose ISS  -lantus 25 units at bedtime (70% of 24 hour basal)   -8 units premeal (70% of premeal)   -f/u A1c, hypogycemia protocol   -consult Dr. Craig Perlman DM type 1, hyperglycemic on admission, bg in 300s  -patient is on insulin pump 3 units/hr basal rate x 24 hrs, uses humalog: will hold insulin pump for now as patient's site got disconnected when changing his clothes today (on prior admission had kept pump at 70% basal rate ie 2.1 units/hr x 24 hrs)  -low dose ISS  -will start lantus 25 units at bedtime  -8 units premeal (70% of premeal)   -f/u A1c, hypogycemia protocol   -consult Dr. Craig Perlman DM type 1, hyperglycemic on admission, bg in 300s  -patient is on insulin pump 3 units/hr basal rate x 24 hrs, uses humalog: will hold insulin pump for now as patient's site got disconnected when changing his clothes today (on prior admission had kept pump at 70% basal rate ie 2.1 units/hr x 24 hrs)  -family to bring tube for pump, HOLD insulin pump until tomorrow evening (24 after bedtime lantus)   -low dose ISS  -will start lantus 25 units at bedtime  -8 units premeal (70% of premeal)   -f/u A1c, hypogycemia protocol   -consult Dr. Craig Perlman

## 2021-02-10 NOTE — H&P ADULT - NEUROLOGICAL DETAILS
alert and oriented x 3/responds to verbal commands alert and oriented x 3/responds to verbal commands/cranial nerves intact/normal strength

## 2021-02-10 NOTE — H&P ADULT - PROBLEM SELECTOR PLAN 6
[place] CVA (2015) - residual deficits of loss of pain and temperature sensation in left arm and right face  -continue home aspirin 325 mg   -continue home gabapentin for residual neuropathic facial pain. Renal transplant recipient, s/p right renal transplant for CKD (2013)  -continue home azathioprine 100 mg qD and tacrolimus 1.5 mg BID   -continue home SMX -80 mg qD for chronic UTI ppx

## 2021-02-10 NOTE — CONSULT NOTE ADULT - SUBJECTIVE AND OBJECTIVE BOX
Patient is a 55y old  Male who presents with a chief complaint of osteomyelitis (10 Feb 2021 18:23)      Reason For Consult:     HPI:   56 YO M with hx CKD s/p renal transplant, CVA, DM, neuropathy, DVT, HLD, Hypertension ,Obesity, squamous cell carcinoma, presents to ED for redness/swelling R leg. Patient states 3 days ago, he first noticed his toe nail on the R 3rd digit starting to "lift up" from a "small pimple" underneath. States there was also clear/red oozing. Patient made an appointment with Dr. Araiza for Friday of this week. Yesterday patient developed redness and swelling in the R lower leg and had blood sugars in the 300s which prompted him to come to the ED. Patient has peripheral neuropathy and states he has no pain in his legs. Denies any fever, chills, SOB, CP, N/V.     Of note, patient was admitted to [facility] last August for osteomyelitis and had left 3rd toe distal digit partial amputation and right foot distal digit 2nd digit on 8/27/20.       In the ED:   VS: T 98F, HR 76, /75, RR 16, SpO2 96% on RA  Labs: glucose 361, alk phos 147, UA neg, covid-19 neg  US duplex venous LE: neg for DVT   EKG: Sinus bradycardia with 1st degree AV block (unchanged from 8/2020)  XR R foot: cortical erosions of right 3rd distal phalanx  CXR: pending official read     S/p Vancomycin 1g IV, Rocephin 2g IV, 1L NS IV bolus  (10 Feb 2021 15:11)      PAST MEDICAL & SURGICAL HISTORY:  Recurrent squamous cell carcinoma of skin  nose, L arm    History of DVT (deep vein thrombosis)    Squamous cell carcinoma of skin of left ear    CVA (cerebral vascular accident)    Obesity (BMI 30-39.9)    Diabetic peripheral neuropathy    CKD (chronic kidney disease)    Diabetes mellitus    Hyperlipidemia    Hypertension    S/P kidney transplant    End-stage renal failure with renal transplant  12/2013    H/O foot surgery  2005 - right foot - bone fracture - s/p ORIF and subsequent removal of hardware    Vasectomy status  s/p b/l  vasectomy    Ear disease  Left inner ear surgery    Toe infection  2007 L 4th Toe surgery-amputation secondary to osteomyelitis        FAMILY HISTORY:  FH: skin cancer    Family history of diabetes mellitus (DM)          Social History:    MEDICATIONS  (STANDING):  atorvastatin 10 milliGRAM(s) Oral at bedtime  azaTHIOprine 100 milliGRAM(s) Oral daily  cefTRIAXone   IVPB 2000 milliGRAM(s) IV Intermittent every 24 hours  cloNIDine 0.1 milliGRAM(s) Oral two times a day  dextrose 40% Gel 15 Gram(s) Oral once  dextrose 5%. 1000 milliLiter(s) (50 mL/Hr) IV Continuous <Continuous>  dextrose 5%. 1000 milliLiter(s) (100 mL/Hr) IV Continuous <Continuous>  dextrose 50% Injectable 25 Gram(s) IV Push once  dextrose 50% Injectable 12.5 Gram(s) IV Push once  dextrose 50% Injectable 25 Gram(s) IV Push once  famotidine    Tablet 20 milliGRAM(s) Oral daily  gabapentin 300 milliGRAM(s) Oral two times a day  glucagon  Injectable 1 milliGRAM(s) IntraMuscular once  heparin   Injectable 5000 Unit(s) IV Push once  hydrALAZINE 25 milliGRAM(s) Oral three times a day  insulin glargine Injectable (LANTUS) 25 Unit(s) SubCutaneous at bedtime  insulin lispro (ADMELOG) corrective regimen sliding scale   SubCutaneous three times a day before meals  insulin lispro (ADMELOG) corrective regimen sliding scale   SubCutaneous at bedtime  insulin lispro Injectable (ADMELOG) 8 Unit(s) SubCutaneous three times a day before meals  losartan 25 milliGRAM(s) Oral daily  metoprolol tartrate Oral Tab/Cap - Peds 75 milliGRAM(s) Oral two times a day  tacrolimus 1.5 milliGRAM(s) Oral two times a day  trimethoprim   80 mG/sulfamethoxazole 400 mG 1 Tablet(s) Oral daily    MEDICATIONS  (PRN):        T(C): 36.2 (02-10-21 @ 17:18), Max: 36.7 (02-10-21 @ 12:34)  HR: 60 (02-10-21 @ 17:18) (58 - 76)  BP: 177/91 (02-10-21 @ 17:18) (137/75 - 194/97)  RR: 17 (02-10-21 @ 17:18) (16 - 18)  SpO2: 98% (02-10-21 @ 17:18) (96% - 98%)  Wt(kg): --    PHYSICAL EXAM:  GENERAL: NAD, well-groomed, well-developed  HEAD:  Atraumatic, Normocephalic  NECK: Supple, No JVD, Normal thyroid  CHEST/LUNG: Clear to percussion bilaterally; No rales, rhonchi, wheezing, or rubs  HEART: Regular rate and rhythm; No murmurs, rubs, or gallops  ABDOMEN: Soft, Nontender, Nondistended; Bowel sounds present  EXTREMITIES:  le foot dsg intact    CAPILLARY BLOOD GLUCOSE      POCT Blood Glucose.: 306 mg/dL (10 Feb 2021 17:12)                            14.6   6.34  )-----------( 204      ( 10 Feb 2021 10:11 )             43.8       CMP:  02-10 @ 10:11  SGPT 24  Albumin 3.9   Alk Phos 147   Anion Gap 6   SGOT 15   Total Bili 0.5   BUN 27   Calcium Total 9.2   CO2 29   Chloride 102   Creatinine 1.30   eGFR if AA 72   eGFR if non AA 62   Glucose 361   Potassium 4.6   Protein 8.1   Sodium 137      Thyroid Function Tests:      Diabetes Tests:       Radiology:

## 2021-02-10 NOTE — CONSULT NOTE ADULT - PROBLEM SELECTOR RECOMMENDATION 9
Pt assessed with Dr. Araiza  X-ray of right foot noted with cortical erosions of right 3rd distal phalanx  Performed sharp excisional debridement of right third digit wound, b/l hallux HPK lesion with a sterile #15 blade to the level of healthy subcutaneous tissue.  Right 3rd digit wound noted with purulent drainage, sent tissue for culture.    Pt booked for the OR tomorrow 2/11 for right 3rd distal digit amputation with Dr. Araiza.  Please acquire medical and cardiac clearance.  Vascular consulted for clearance and possible workup.   Please keep pt NPO after midnight.    Podiatry will follow pt while in house.
insulin pump disconnected during hospitalization  lantus 25 units qhs + admelog 8 units 3x/day before meals started  cont low dose admelog scale coverage qac/qhs  to hold standing pre-meal admelog while npo  goal bg 100-180 in hosp setting
tacrolimus 1.5 milliGRAM(s) Oral two times a day  azaTHIOprine 100 milliGRAM(s) Oral daily

## 2021-02-10 NOTE — H&P ADULT - NEGATIVE GASTROINTESTINAL SYMPTOMS
no nausea/no vomiting/no diarrhea/no constipation no nausea/no vomiting/no diarrhea/no constipation/no change in bowel habits/no abdominal pain

## 2021-02-10 NOTE — H&P ADULT - ATTENDING COMMENTS
Pt seen, examined, Case & care plan d/w pt, residents at detail.  D/W Podiatry-Pt is schedule for OR in AM   Pt is medically optimized for schedule Podiatry surgery   NPO after midnight  -Pre op IV antibiotics as per sx   -ID - Dr Saucedo  Podiatry  DR Araiza

## 2021-02-10 NOTE — CONSULT NOTE ADULT - PROBLEM SELECTOR RECOMMENDATION 2
CHRONIC KIDNEY DISEASE, STAGE 1: s/p kidney transplant   Serum creatinine is stable at 1.3   There is no progression.  No uremic symptoms. No evidence of  worsening  Anemia. Fluid status stable.   Will continue to avoid nephrotoxic drugs.  Patient remains asymptomatic.  Continue current therapy.

## 2021-02-10 NOTE — CONSULT NOTE ADULT - PROBLEM SELECTOR RECOMMENDATION 3
hydrALAZINE 25 milliGRAM(s) Oral three times a day  losartan 25 milliGRAM(s) Oral daily  metoprolol tartrate Oral Tab/Cap - Peds 75 milliGRAM(s) Oral two times a day    BP monitoring,continue current antihypertensive meds, low salt diet,followup with PMD in 1-2 weeks

## 2021-02-10 NOTE — ED PROVIDER NOTE - ATTENDING CONTRIBUTION TO CARE
55 M history of DM, renal transplant here complaining of 2 days of right leg redness. also with wounds to toes. no nausea, vomiting, fevers, chills. On exam patient well appearing, lungs clear to auscultation anteriorly, heart regular rate and rhythm, abdomen soft, non-tender, right shin with erythema, multiple toes with eschar. concern for cellulitis, osteo.

## 2021-02-10 NOTE — ED ADULT NURSE NOTE - PMH
CKD (chronic kidney disease)    CVA (cerebral vascular accident)    Diabetes mellitus    Diabetic peripheral neuropathy    History of DVT (deep vein thrombosis)    Hyperlipidemia    Hypertension    Obesity (BMI 30-39.9)    Squamous cell carcinoma of skin of left ear

## 2021-02-10 NOTE — H&P ADULT - NSHPOUTPATIENTPROVIDERS_GEN_ALL_CORE
PCP - Dr. Kobe Miranda  Kidney Transplant - Dr. Navarro, Dr. Lindsay [facility]  Nephrologist - Dr. Weber  Podiatrist - Dr. Pedersen  Neurologist - Dr. Claire  Endocrinologist - Dr. Garcia

## 2021-02-10 NOTE — H&P ADULT - ASSESSMENT
54 YO M with hx CKD s/p renal transplant, CVA, DM, neuropathy, DVT, HLD, Hypertension ,Obesity, squamous cell carcinoma, presents to ED for redness/swelling R leg, admitted for osteomyelitis of R 3rd distal phalanx

## 2021-02-10 NOTE — CONSULT NOTE ADULT - SUBJECTIVE AND OBJECTIVE BOX
HPI:  Podiatry was consulted for this 56 yo M who presents to the ED with chief complaint of right leg swelling, redness, as well as a wound to his right 3rd digit.  The pt relates that this morning he felt warm, also noted that his sugar was well over 300 mg/dl which indicated to him that he had an infection.   The pt then noticed his leg was "blown up" and he noted drainage from the top of his right third toe by the nail.  The pt has neuropathy and denies any pain to the toe.   The pt has previously seen Dr. Araiza in the wound care center for his other toes as well, and has had numerous distal digit amputations as well as a complete digital amputation of his left 3rd toe.  Denies any fever, chills, nausea, vomiting, chest pain, shortness of breath, or calf pain at this time.    REVIEW OF SYSTEMS    PAST MEDICAL & SURGICAL HISTORY:  History of DVT (deep vein thrombosis)  Squamous cell carcinoma of skin of left ear  CVA (cerebral vascular accident)  Obesity (BMI 30-39.9)  Diabetic peripheral neuropathy  CKD (chronic kidney disease)  Diabetes mellitus  Hyperlipidemis  Hypertension  S/P kidney transplant  End-stage renal failure with renal transplant  12/2013  H/O foot surgery  2005 - right foot - bone fracture - s/p ORIF and subsequent removal of hardware  Vasectomy status  s/p b/l  vasectomy    Ear disease  Left inner ear surgery    Toe infection  2007 L 4th Toe surgery-amputation secondary to osteomyelitis        Allergies    No Known Allergies    Intolerances        MEDICATIONS  (STANDING):  cefTRIAXone   IVPB 2000 milliGRAM(s) IV Intermittent once    MEDICATIONS  (PRN):      Social History:      FAMILY HISTORY:  FH: skin cancer    Family history of diabetes mellitus (DM)        Vital Signs Last 24 Hrs  T(C): 36.7 (10 Feb 2021 12:34), Max: 36.7 (10 Feb 2021 12:34)  T(F): 98 (10 Feb 2021 12:34), Max: 98 (10 Feb 2021 12:34)  HR: 76 (10 Feb 2021 12:34) (65 - 76)  BP: 137/75 (10 Feb 2021 12:34) (137/75 - 194/97)  BP(mean): --  RR: 16 (10 Feb 2021 12:34) (16 - 18)  SpO2: 96% (10 Feb 2021 12:34) (96% - 97%)    PHYSICAL EXAM:  Vascular: DP palpable, PT dopplerable bilaterally, Capillary refill 3 seconds, Digital hair present bilaterally  Neurological: Light touch sensation diminished bilaterally  Musculoskeletal: 5/5 strength in all quadrants bilaterally, AJ & STJ ROM intact  Dermatological: 1cm annular ulceration noted to the distal tip of the right 3rd digit with HPK overlying, granular wound bed,  (+) probe to bone, (+) periwound erythema, (+) purulent drainage, no fluctuance, no malodor.  HPK noted to medial aspect of hallux b/l.   Right lower leg noted to be erythematous, with significant edema compared to contralateral leg, increased warmth.    CBC Full  -  ( 10 Feb 2021 10:11 )  WBC Count : 6.34 K/uL  RBC Count : 5.10 M/uL  Hemoglobin : 14.6 g/dL  Hematocrit : 43.8 %  Platelet Count - Automated : 204 K/uL  Mean Cell Volume : 85.9 fl  Mean Cell Hemoglobin : 28.6 pg  Mean Cell Hemoglobin Concentration : 33.3 gm/dL  Auto Neutrophil # : 4.71 K/uL  Auto Lymphocyte # : 0.83 K/uL  Auto Monocyte # : 0.47 K/uL  Auto Eosinophil # : 0.26 K/uL  Auto Basophil # : 0.05 K/uL  Auto Neutrophil % : 74.3 %  Auto Lymphocyte % : 13.1 %  Auto Monocyte % : 7.4 %  Auto Eosinophil % : 4.1 %  Auto Basophil % : 0.8 %      ----------CHEM PANEL----------    PT/INR - ( 10 Feb 2021 10:11 )   PT: 11.2 sec;   INR: 0.96 ratio         PTT - ( 10 Feb 2021 10:11 )  PTT:30.5 sec        Imaging: ----------

## 2021-02-10 NOTE — H&P ADULT - RS GEN PE MLT RESP DETAILS PC
normal/airway patent/breath sounds equal/good air movement/clear to auscultation bilaterally/no chest wall tenderness/no intercostal retractions/no rales/no rhonchi/no wheezes normal/airway patent/breath sounds equal/good air movement/respirations non-labored/clear to auscultation bilaterally/no chest wall tenderness/no intercostal retractions/no rales/no rhonchi/no wheezes

## 2021-02-11 ENCOUNTER — RESULT REVIEW (OUTPATIENT)
Age: 56
End: 2021-02-11

## 2021-02-11 ENCOUNTER — TRANSCRIPTION ENCOUNTER (OUTPATIENT)
Age: 56
End: 2021-02-11

## 2021-02-11 LAB
A1C WITH ESTIMATED AVERAGE GLUCOSE RESULT: 10 % — HIGH (ref 4–5.6)
A1C WITH ESTIMATED AVERAGE GLUCOSE RESULT: 10.1 % — HIGH (ref 4–5.6)
ALBUMIN SERPL ELPH-MCNC: 3.3 G/DL — SIGNIFICANT CHANGE UP (ref 3.3–5)
ALP SERPL-CCNC: 120 U/L — SIGNIFICANT CHANGE UP (ref 40–120)
ALT FLD-CCNC: 21 U/L — SIGNIFICANT CHANGE UP (ref 12–78)
ANION GAP SERPL CALC-SCNC: 5 MMOL/L — SIGNIFICANT CHANGE UP (ref 5–17)
AST SERPL-CCNC: 15 U/L — SIGNIFICANT CHANGE UP (ref 15–37)
BASOPHILS # BLD AUTO: 0.06 K/UL — SIGNIFICANT CHANGE UP (ref 0–0.2)
BASOPHILS NFR BLD AUTO: 0.9 % — SIGNIFICANT CHANGE UP (ref 0–2)
BILIRUB SERPL-MCNC: 0.6 MG/DL — SIGNIFICANT CHANGE UP (ref 0.2–1.2)
BUN SERPL-MCNC: 19 MG/DL — SIGNIFICANT CHANGE UP (ref 7–23)
CALCIUM SERPL-MCNC: 8.7 MG/DL — SIGNIFICANT CHANGE UP (ref 8.5–10.1)
CHLORIDE SERPL-SCNC: 104 MMOL/L — SIGNIFICANT CHANGE UP (ref 96–108)
CO2 SERPL-SCNC: 30 MMOL/L — SIGNIFICANT CHANGE UP (ref 22–31)
CREAT SERPL-MCNC: 1.1 MG/DL — SIGNIFICANT CHANGE UP (ref 0.5–1.3)
EOSINOPHIL # BLD AUTO: 0.34 K/UL — SIGNIFICANT CHANGE UP (ref 0–0.5)
EOSINOPHIL NFR BLD AUTO: 4.9 % — SIGNIFICANT CHANGE UP (ref 0–6)
ESTIMATED AVERAGE GLUCOSE: 240 MG/DL — HIGH (ref 68–114)
ESTIMATED AVERAGE GLUCOSE: 243 MG/DL — HIGH (ref 68–114)
GLUCOSE SERPL-MCNC: 306 MG/DL — HIGH (ref 70–99)
GRAM STN FLD: SIGNIFICANT CHANGE UP
HCT VFR BLD CALC: 41.6 % — SIGNIFICANT CHANGE UP (ref 39–50)
HGB BLD-MCNC: 13.9 G/DL — SIGNIFICANT CHANGE UP (ref 13–17)
IMM GRANULOCYTES NFR BLD AUTO: 0.1 % — SIGNIFICANT CHANGE UP (ref 0–1.5)
LYMPHOCYTES # BLD AUTO: 1.26 K/UL — SIGNIFICANT CHANGE UP (ref 1–3.3)
LYMPHOCYTES # BLD AUTO: 18 % — SIGNIFICANT CHANGE UP (ref 13–44)
MCHC RBC-ENTMCNC: 28.7 PG — SIGNIFICANT CHANGE UP (ref 27–34)
MCHC RBC-ENTMCNC: 33.4 GM/DL — SIGNIFICANT CHANGE UP (ref 32–36)
MCV RBC AUTO: 86 FL — SIGNIFICANT CHANGE UP (ref 80–100)
MONOCYTES # BLD AUTO: 0.56 K/UL — SIGNIFICANT CHANGE UP (ref 0–0.9)
MONOCYTES NFR BLD AUTO: 8 % — SIGNIFICANT CHANGE UP (ref 2–14)
MRSA PCR RESULT.: SIGNIFICANT CHANGE UP
NEUTROPHILS # BLD AUTO: 4.77 K/UL — SIGNIFICANT CHANGE UP (ref 1.8–7.4)
NEUTROPHILS NFR BLD AUTO: 68.1 % — SIGNIFICANT CHANGE UP (ref 43–77)
NRBC # BLD: 0 /100 WBCS — SIGNIFICANT CHANGE UP (ref 0–0)
PLATELET # BLD AUTO: 198 K/UL — SIGNIFICANT CHANGE UP (ref 150–400)
POTASSIUM SERPL-MCNC: 4.4 MMOL/L — SIGNIFICANT CHANGE UP (ref 3.5–5.3)
POTASSIUM SERPL-SCNC: 4.4 MMOL/L — SIGNIFICANT CHANGE UP (ref 3.5–5.3)
PROT SERPL-MCNC: 7 G/DL — SIGNIFICANT CHANGE UP (ref 6–8.3)
RBC # BLD: 4.84 M/UL — SIGNIFICANT CHANGE UP (ref 4.2–5.8)
RBC # FLD: 13.4 % — SIGNIFICANT CHANGE UP (ref 10.3–14.5)
S AUREUS DNA NOSE QL NAA+PROBE: SIGNIFICANT CHANGE UP
SARS-COV-2 IGG SERPL QL IA: NEGATIVE — SIGNIFICANT CHANGE UP
SARS-COV-2 IGM SERPL IA-ACNC: 0.06 INDEX — SIGNIFICANT CHANGE UP
SODIUM SERPL-SCNC: 139 MMOL/L — SIGNIFICANT CHANGE UP (ref 135–145)
SPECIMEN SOURCE: SIGNIFICANT CHANGE UP
WBC # BLD: 7 K/UL — SIGNIFICANT CHANGE UP (ref 3.8–10.5)
WBC # FLD AUTO: 7 K/UL — SIGNIFICANT CHANGE UP (ref 3.8–10.5)

## 2021-02-11 PROCEDURE — 99232 SBSQ HOSP IP/OBS MODERATE 35: CPT

## 2021-02-11 PROCEDURE — 88304 TISSUE EXAM BY PATHOLOGIST: CPT | Mod: 26

## 2021-02-11 PROCEDURE — 73630 X-RAY EXAM OF FOOT: CPT | Mod: 26,RT

## 2021-02-11 PROCEDURE — 28825 PARTIAL AMPUTATION OF TOE: CPT | Mod: T7

## 2021-02-11 PROCEDURE — 88311 DECALCIFY TISSUE: CPT | Mod: 26

## 2021-02-11 RX ORDER — INSULIN LISPRO 100/ML
VIAL (ML) SUBCUTANEOUS
Refills: 0 | Status: DISCONTINUED | OUTPATIENT
Start: 2021-02-11 | End: 2021-02-16

## 2021-02-11 RX ORDER — GLUCAGON INJECTION, SOLUTION 0.5 MG/.1ML
1 INJECTION, SOLUTION SUBCUTANEOUS ONCE
Refills: 0 | Status: DISCONTINUED | OUTPATIENT
Start: 2021-02-11 | End: 2021-02-16

## 2021-02-11 RX ORDER — TACROLIMUS 5 MG/1
1.5 CAPSULE ORAL
Refills: 0 | Status: DISCONTINUED | OUTPATIENT
Start: 2021-02-11 | End: 2021-02-16

## 2021-02-11 RX ORDER — LOSARTAN POTASSIUM 100 MG/1
25 TABLET, FILM COATED ORAL DAILY
Refills: 0 | Status: DISCONTINUED | OUTPATIENT
Start: 2021-02-11 | End: 2021-02-16

## 2021-02-11 RX ORDER — DEXTROSE 50 % IN WATER 50 %
25 SYRINGE (ML) INTRAVENOUS ONCE
Refills: 0 | Status: DISCONTINUED | OUTPATIENT
Start: 2021-02-11 | End: 2021-02-16

## 2021-02-11 RX ORDER — SODIUM CHLORIDE 9 MG/ML
1000 INJECTION, SOLUTION INTRAVENOUS
Refills: 0 | Status: DISCONTINUED | OUTPATIENT
Start: 2021-02-11 | End: 2021-02-11

## 2021-02-11 RX ORDER — HYDRALAZINE HCL 50 MG
25 TABLET ORAL THREE TIMES A DAY
Refills: 0 | Status: DISCONTINUED | OUTPATIENT
Start: 2021-02-11 | End: 2021-02-16

## 2021-02-11 RX ORDER — METOPROLOL TARTRATE 50 MG
75 TABLET ORAL
Refills: 0 | Status: DISCONTINUED | OUTPATIENT
Start: 2021-02-11 | End: 2021-02-11

## 2021-02-11 RX ORDER — INSULIN LISPRO 100/ML
8 VIAL (ML) SUBCUTANEOUS
Refills: 0 | Status: DISCONTINUED | OUTPATIENT
Start: 2021-02-11 | End: 2021-02-11

## 2021-02-11 RX ORDER — SODIUM CHLORIDE 9 MG/ML
1000 INJECTION, SOLUTION INTRAVENOUS
Refills: 0 | Status: DISCONTINUED | OUTPATIENT
Start: 2021-02-11 | End: 2021-02-16

## 2021-02-11 RX ORDER — CEFTRIAXONE 500 MG/1
2000 INJECTION, POWDER, FOR SOLUTION INTRAMUSCULAR; INTRAVENOUS EVERY 24 HOURS
Refills: 0 | Status: DISCONTINUED | OUTPATIENT
Start: 2021-02-11 | End: 2021-02-16

## 2021-02-11 RX ORDER — INSULIN GLARGINE 100 [IU]/ML
35 INJECTION, SOLUTION SUBCUTANEOUS AT BEDTIME
Refills: 0 | Status: DISCONTINUED | OUTPATIENT
Start: 2021-02-11 | End: 2021-02-13

## 2021-02-11 RX ORDER — INSULIN LISPRO 100/ML
VIAL (ML) SUBCUTANEOUS AT BEDTIME
Refills: 0 | Status: DISCONTINUED | OUTPATIENT
Start: 2021-02-11 | End: 2021-02-11

## 2021-02-11 RX ORDER — DEXTROSE 50 % IN WATER 50 %
15 SYRINGE (ML) INTRAVENOUS ONCE
Refills: 0 | Status: DISCONTINUED | OUTPATIENT
Start: 2021-02-11 | End: 2021-02-16

## 2021-02-11 RX ORDER — ONDANSETRON 8 MG/1
4 TABLET, FILM COATED ORAL ONCE
Refills: 0 | Status: DISCONTINUED | OUTPATIENT
Start: 2021-02-11 | End: 2021-02-11

## 2021-02-11 RX ORDER — GABAPENTIN 400 MG/1
300 CAPSULE ORAL
Refills: 0 | Status: DISCONTINUED | OUTPATIENT
Start: 2021-02-11 | End: 2021-02-16

## 2021-02-11 RX ORDER — ATORVASTATIN CALCIUM 80 MG/1
10 TABLET, FILM COATED ORAL AT BEDTIME
Refills: 0 | Status: DISCONTINUED | OUTPATIENT
Start: 2021-02-11 | End: 2021-02-16

## 2021-02-11 RX ORDER — FAMOTIDINE 10 MG/ML
20 INJECTION INTRAVENOUS DAILY
Refills: 0 | Status: DISCONTINUED | OUTPATIENT
Start: 2021-02-11 | End: 2021-02-16

## 2021-02-11 RX ORDER — METOPROLOL TARTRATE 50 MG
75 TABLET ORAL
Refills: 0 | Status: DISCONTINUED | OUTPATIENT
Start: 2021-02-11 | End: 2021-02-16

## 2021-02-11 RX ORDER — AZATHIOPRINE 100 MG/1
100 TABLET ORAL DAILY
Refills: 0 | Status: DISCONTINUED | OUTPATIENT
Start: 2021-02-11 | End: 2021-02-16

## 2021-02-11 RX ORDER — INSULIN GLARGINE 100 [IU]/ML
25 INJECTION, SOLUTION SUBCUTANEOUS AT BEDTIME
Refills: 0 | Status: DISCONTINUED | OUTPATIENT
Start: 2021-02-11 | End: 2021-02-11

## 2021-02-11 RX ORDER — OXYCODONE HYDROCHLORIDE 5 MG/1
5 TABLET ORAL ONCE
Refills: 0 | Status: DISCONTINUED | OUTPATIENT
Start: 2021-02-11 | End: 2021-02-11

## 2021-02-11 RX ORDER — DEXTROSE 50 % IN WATER 50 %
12.5 SYRINGE (ML) INTRAVENOUS ONCE
Refills: 0 | Status: DISCONTINUED | OUTPATIENT
Start: 2021-02-11 | End: 2021-02-16

## 2021-02-11 RX ORDER — HYDROMORPHONE HYDROCHLORIDE 2 MG/ML
0.5 INJECTION INTRAMUSCULAR; INTRAVENOUS; SUBCUTANEOUS
Refills: 0 | Status: DISCONTINUED | OUTPATIENT
Start: 2021-02-11 | End: 2021-02-11

## 2021-02-11 RX ORDER — INSULIN LISPRO 100/ML
10 VIAL (ML) SUBCUTANEOUS
Refills: 0 | Status: DISCONTINUED | OUTPATIENT
Start: 2021-02-11 | End: 2021-02-13

## 2021-02-11 RX ORDER — INSULIN LISPRO 100/ML
VIAL (ML) SUBCUTANEOUS AT BEDTIME
Refills: 0 | Status: DISCONTINUED | OUTPATIENT
Start: 2021-02-11 | End: 2021-02-16

## 2021-02-11 RX ORDER — INSULIN LISPRO 100/ML
VIAL (ML) SUBCUTANEOUS
Refills: 0 | Status: DISCONTINUED | OUTPATIENT
Start: 2021-02-11 | End: 2021-02-11

## 2021-02-11 RX ADMIN — INSULIN GLARGINE 35 UNIT(S): 100 INJECTION, SOLUTION SUBCUTANEOUS at 21:26

## 2021-02-11 RX ADMIN — Medication 1 TABLET(S): at 12:57

## 2021-02-11 RX ADMIN — Medication 4: at 07:42

## 2021-02-11 RX ADMIN — FAMOTIDINE 20 MILLIGRAM(S): 10 INJECTION INTRAVENOUS at 12:56

## 2021-02-11 RX ADMIN — Medication 25 MILLIGRAM(S): at 13:01

## 2021-02-11 RX ADMIN — Medication 75 MILLIGRAM(S): at 16:52

## 2021-02-11 RX ADMIN — Medication 0.1 MILLIGRAM(S): at 05:07

## 2021-02-11 RX ADMIN — GABAPENTIN 300 MILLIGRAM(S): 400 CAPSULE ORAL at 05:07

## 2021-02-11 RX ADMIN — CEFTRIAXONE 100 MILLIGRAM(S): 500 INJECTION, POWDER, FOR SOLUTION INTRAMUSCULAR; INTRAVENOUS at 16:52

## 2021-02-11 RX ADMIN — TACROLIMUS 1.5 MILLIGRAM(S): 5 CAPSULE ORAL at 21:27

## 2021-02-11 RX ADMIN — Medication 3: at 11:07

## 2021-02-11 RX ADMIN — Medication 0.1 MILLIGRAM(S): at 16:52

## 2021-02-11 RX ADMIN — Medication 6: at 16:51

## 2021-02-11 RX ADMIN — SODIUM CHLORIDE 75 MILLILITER(S): 9 INJECTION, SOLUTION INTRAVENOUS at 10:05

## 2021-02-11 RX ADMIN — Medication 2: at 21:27

## 2021-02-11 RX ADMIN — GABAPENTIN 300 MILLIGRAM(S): 400 CAPSULE ORAL at 16:57

## 2021-02-11 RX ADMIN — TACROLIMUS 1.5 MILLIGRAM(S): 5 CAPSULE ORAL at 07:44

## 2021-02-11 RX ADMIN — Medication 8 UNIT(S): at 11:07

## 2021-02-11 RX ADMIN — Medication 25 MILLIGRAM(S): at 21:27

## 2021-02-11 RX ADMIN — Medication 25 MILLIGRAM(S): at 05:07

## 2021-02-11 RX ADMIN — LOSARTAN POTASSIUM 25 MILLIGRAM(S): 100 TABLET, FILM COATED ORAL at 05:07

## 2021-02-11 RX ADMIN — ATORVASTATIN CALCIUM 10 MILLIGRAM(S): 80 TABLET, FILM COATED ORAL at 21:27

## 2021-02-11 RX ADMIN — Medication 10 UNIT(S): at 16:51

## 2021-02-11 RX ADMIN — Medication 75 MILLIGRAM(S): at 05:07

## 2021-02-11 RX ADMIN — AZATHIOPRINE 100 MILLIGRAM(S): 100 TABLET ORAL at 12:56

## 2021-02-11 NOTE — DISCHARGE NOTE PROVIDER - NSDCCPCAREPLAN_GEN_ALL_CORE_FT
PRINCIPAL DISCHARGE DIAGNOSIS  Diagnosis: Osteomyelitis of right foot, unspecified type  Assessment and Plan of Treatment:       SECONDARY DISCHARGE DIAGNOSES  Diagnosis: Renal transplant recipient  Assessment and Plan of Treatment:     Diagnosis: Diabetes mellitus  Assessment and Plan of Treatment:      PRINCIPAL DISCHARGE DIAGNOSIS  Diagnosis: Osteomyelitis of right foot, unspecified type  Assessment and Plan of Treatment: WOUND CARE INSTRUCTIONS  1) Keep dressing clean, dry, intact until follow up.    2) Monitor foot for signs of infection including redness, swelling, drainage, foul odor.   If any are noted, please come to ED immediately.   3) Please follow up with Dr. Araiza at the [facility] within 3-5 days of discharge.      SECONDARY DISCHARGE DIAGNOSES  Diagnosis: Diabetes mellitus  Assessment and Plan of Treatment:     Diagnosis: Renal transplant recipient  Assessment and Plan of Treatment:      PRINCIPAL DISCHARGE DIAGNOSIS  Diagnosis: Osteomyelitis of right foot, unspecified type  Assessment and Plan of Treatment: You were noted to have an infection of the bone of the 3rd toe in your right foot and it was amputated. You were placed on a course of anitbiotics and will continue the course via PICC line which was placed in the hospital.  - Continue antibiotics until _____  - follow up with podiatry within 3-5 days of discharge from hospital  WOUND CARE INSTRUCTIONS  1) Keep dressing clean, dry, intact until follow up.    2) Monitor foot for signs of infection including redness, swelling, drainage, foul odor.   If any are noted, please come to ED immediately.   3) Please follow up with Dr. Araiza at the [facility]ound center within 3-5 days of discharge.      SECONDARY DISCHARGE DIAGNOSES  Diagnosis: Diabetes mellitus  Assessment and Plan of Treatment: Continue diabetes management with insulin pump as you did prior to hospitalization  - follow up with the endocrinologist outpatient    Diagnosis: Renal transplant recipient  Assessment and Plan of Treatment: - continue with tacrolimus   - follow up with nephrologist outpatient     PRINCIPAL DISCHARGE DIAGNOSIS  Diagnosis: Osteomyelitis of right foot, unspecified type  Assessment and Plan of Treatment: You were noted to have an infection of the bone of the 3rd toe in your right foot and it was amputated. You were placed on a course of anitbiotics and will continue the course via PICC line which was placed in the hospital.  - Continue keflex 500mg four times a day until 2/24  - follow up with podiatry within 3-5 days of discharge from hospital  WOUND CARE INSTRUCTIONS  1) Keep dressing clean, dry, intact until follow up.    2) Monitor foot for signs of infection including redness, swelling, drainage, foul odor.   If any are noted, please come to ED immediately.   3) Please follow up with Dr. Araiza at the [facility]ound center within 3-5 days of discharge.      SECONDARY DISCHARGE DIAGNOSES  Diagnosis: Diabetes mellitus  Assessment and Plan of Treatment: Continue diabetes management with insulin pump as you did prior to hospitalization  - start using insulin pump tonight  - follow up with the endocrinologist outpatient    Diagnosis: Renal transplant recipient  Assessment and Plan of Treatment: - continue with tacrolimus   - follow up with nephrologist outpatient     PRINCIPAL DISCHARGE DIAGNOSIS  Diagnosis: Osteomyelitis of right foot, unspecified type  Assessment and Plan of Treatment: You came with  an infection/ wound  of the bone of the 3rd toe in your right foot and  X Ray changes , You had amputation by Dr Araiza   -. You were placed on a course of anitbiotics and will continue the course via PICC line which was placed in the hospital.  - Start keflex 500mg 1 tab four times a day until 2/24  - follow up with podiatry within 3-5 days of discharge from hospital  -Keep Dressing Dry/ Clean & Intact,   WOUND CARE INSTRUCTIONS  1) Keep dressing clean, dry, intact until follow up.    2) Monitor foot for signs of infection including redness, swelling, drainage, foul odor.   If any are noted, please come to ED immediately.   3) Please follow up with Dr. Araiza at the [facility]ound center within 3-5 days of discharge.      SECONDARY DISCHARGE DIAGNOSES  Diagnosis: Diabetes mellitus  Assessment and Plan of Treatment: Continue diabetes management with insulin pump as you did prior to hospitalization.  - start using insulin pump Starting tonight as before  - follow up with the endocrinologist outpatient    Diagnosis: CVA (cerebral vascular accident)  Assessment and Plan of Treatment: On  mg 1 tab daily    Diagnosis: HTN (hypertension)  Assessment and Plan of Treatment: Clonidine 0.1 mg 1 tab 2x day , ASA daily  Hydralazine 25 mg 1 tab 3x day  Losartan 25 mg 1 tab daily  Metaprolol 75 mg 1 tab 2x day    Diagnosis: Neuropathy  Assessment and Plan of Treatment: Gabapentene 300 mg 1 tab 2x day    Diagnosis: HLD (hyperlipidemia)  Assessment and Plan of Treatment: Lovastatin 40 mg 1 tab daily    Diagnosis: Renal transplant recipient  Assessment and Plan of Treatment: - continue with tacrolimus & Azathioprin   -Prophylaxis for Renal Transplant for PC  - follow up with nephrologist outpatient Dr Valenzuela as schedule

## 2021-02-11 NOTE — PROGRESS NOTE ADULT - PROBLEM SELECTOR PLAN 1
increase lantus 35 units qhs  increase admelog 10 units 3x/day before meals  change mod dose admelog scale coverage qac/qhs  to change mdii to insulin pump this evening if patient wishes to  goal bg 100-180 in hosp setting  cont cons cho diet

## 2021-02-11 NOTE — DISCHARGE NOTE PROVIDER - HOSPITAL COURSE
FROM ADMISSION HPI:  56 YO M with hx CKD s/p renal transplant, CVA, DM, neuropathy, DVT, HLD, Hypertension ,Obesity, squamous cell carcinoma, presents to ED for redness/swelling R leg. Patient states 3 days ago, he first noticed his toe nail on the R 3rd digit starting to "lift up" from a "small pimple" underneath. States there was also clear/red oozing. Patient made an appointment with Dr. Araiza for Friday of this week. Yesterday patient developed redness and swelling in the R lower leg and had blood sugars in the 300s which prompted him to come to the ED. Patient has peripheral neuropathy and states he has no pain in his legs. Denies any fever, chills, SOB, CP, N/V.   Of note, patient was admitted to [facility] last August for osteomyelitis and had left 3rd toe distal digit partial amputation and right foot distal digit 2nd digit on 8/27/20.     In the ED:   VS: T 98F, HR 76, /75, RR 16, SpO2 96% on RA  Labs: glucose 361, alk phos 147, UA neg, covid-19 neg  US duplex venous LE: neg for DVT   EKG: Sinus bradycardia with 1st degree AV block (unchanged from 8/2020)  XR R foot: cortical erosions of right 3rd distal phalanx  CXR: pending official read   S/p Vancomycin 1g IV, Rocephin 2g IV, 1L NS IV bolus    ---  HOSPITAL COURSE: Pt was admitted to [facility] for osteomyelitis of the right 3rd distal phalanx. Infectious Disease was consulted and the patient was given 1 dose of Linezolid. Patient was also started on a 7 day course of Rocephin???? Podiatry was consulted and the patient underwent sharp excisional debridement of right third digit wound, b/l hallux HPK lesion. Tissue cultures were sent and grew -- Vascular surgery was consulted and, given there was no evidence of hemodynamically significant lower extremity arterial disease - patient noted to have palpable pulses in the right foot, had no objection to surgical intervention. Cardiology was consulted and optimized patient for OR. On day 2 of hospital course, patient had a partial amputation of third ray of right foot with Podiatrist Dr. Araiza. Patient tolerated procedure well, no complications reported. Pathology --    For CKD s/p renal transplant, Nephrology was consulted, patient continued on Bactrim, tacrolimus and azathioprine. For DMT2 with insulin pump, Endocrinology was consulted. The insulin pump was disconnected and the patient was started on low dose ISS, Lantus 25 units qhs and admelog 8 units TID before meals.    Physical therapy was consulted and recommended the patient be discharge to ----  Patient showed improvement throughout hospitalization. Patient was seen and examined on day of discharge:    -Right 3rd digit wound noted with purulent drainage, tissue sent for cx by podiatry  -    VITALS:     PHYSICAL EXAM:    Patient was medically optimized for discharge to ---- with close outpatient follow up.    ---  CONSULTANTS:   Vascular Surgery- Dr. Balderas  Podiatry- Dr. Araiza  Cardiology- Dr. Kelly  Infectious Disease- Dr. Saucedo  Endocrinology- Dr. Craig Perlman   Nephrology- Dr. Hilton     ---  TIME SPENT:  I, the attending physician, was physically present for the key portions of the evaluation and management (E/M) service provided. The total amount of time spent reviewing the hospital notes, laboratory values, imaging findings, assessing/counseling the patient, discussing with consultant physicians, social work, nursing staff was -- minutes    ---  FINAL DISCHARGE DIAGNOSIS LIST:  Please see last daily progress note for final discharge diagnoses    ---  Primary care provider was made aware of plan for discharge:      [  ] NO     [ x ] YES   FROM ADMISSION HPI:  56 YO M with hx CKD s/p renal transplant, CVA, DM, neuropathy, DVT, HLD, Hypertension ,Obesity, squamous cell carcinoma, presents to ED for redness/swelling R leg. Patient states 3 days ago, he first noticed his toe nail on the R 3rd digit starting to "lift up" from a "small pimple" underneath. States there was also clear/red oozing. Patient made an appointment with Dr. Araiza for Friday of this week. Yesterday patient developed redness and swelling in the R lower leg and had blood sugars in the 300s which prompted him to come to the ED. Patient has peripheral neuropathy and states he has no pain in his legs. Denies any fever, chills, SOB, CP, N/V.   Of note, patient was admitted to [facility] last August for osteomyelitis and had left 3rd toe distal digit partial amputation and right foot distal digit 2nd digit on 8/27/20.     In the ED:   VS: T 98F, HR 76, /75, RR 16, SpO2 96% on RA  Labs: glucose 361, alk phos 147, UA neg, covid-19 neg  US duplex venous LE: neg for DVT   EKG: Sinus bradycardia with 1st degree AV block (unchanged from 8/2020)  XR R foot: cortical erosions of right 3rd distal phalanx  CXR: pending official read   S/p Vancomycin 1g IV, Rocephin 2g IV, 1L NS IV bolus    ---  HOSPITAL COURSE: Pt was admitted to [facility] for osteomyelitis of the right 3rd distal phalanx. Covid-19 PCR on admission resulted negative. Infectious Disease was consulted and the patient was given 1 dose of Linezolid. Patient was also started on a course of IV Rocephin.  Podiatry was consulted and the patient underwent sharp excisional debridement of right third digit wound, b/l hallux HPK lesion. Tissue cultures were sent and grew -- MRSA swab, staph aureus PCR both resulted negative. Vascular surgery was consulted and, given there was no evidence of hemodynamically significant lower extremity arterial disease - patient noted to have palpable pulses in the right foot, had no objection to surgical intervention. Cardiology was consulted and optimized patient for OR. On day 2 of hospital course, patient had a partial amputation of third ray of right foot with Podiatrist Dr. Araiza. Patient tolerated procedure well, no complications reported. OR tissue cultures -- Pathology -- MRSA swab --    For CKD s/p renal transplant, Nephrology was consulted, patient continued on Bactrim, tacrolimus and azathioprine. For DMT2 with insulin pump, Endocrinology was consulted. HbA1c 10.1%. The insulin pump was disconnected and the patient was started on low dose ISS, Lantus 25 units qhs and admelog 8 units TID before meals. Blood glucose remained poorly controlled and Lantus increased to 25 units qhs, admelog increased to 10 units TID before meals. Insulin sliding sale changed to moderate dose.    Physical therapy was consulted and recommended the patient be discharge to ----    Patient showed improvement throughout hospitalization. Patient was seen and examined on day of discharge:    VITALS:     PHYSICAL EXAM:    Patient was medically optimized for discharge to ---- with close outpatient follow up.    ---  CONSULTANTS:   Vascular Surgery- Dr. Balderas  Podiatry- Dr. Araiza  Cardiology- Dr. Kelly  Infectious Disease- Dr. Saucedo  Endocrinology- Dr. Craig Perlman   Nephrology- Dr. Hilton     ---  TIME SPENT:  I, the attending physician, was physically present for the key portions of the evaluation and management (E/M) service provided. The total amount of time spent reviewing the hospital notes, laboratory values, imaging findings, assessing/counseling the patient, discussing with consultant physicians, social work, nursing staff was -- minutes    ---  FINAL DISCHARGE DIAGNOSIS LIST:  Please see last daily progress note for final discharge diagnoses    ---  Primary care provider was made aware of plan for discharge:      [  ] NO     [ x ] YES   FROM ADMISSION HPI:  56 YO M with hx CKD s/p renal transplant, CVA, DM, neuropathy, DVT, HLD, Hypertension ,Obesity, squamous cell carcinoma, presents to ED for redness/swelling R leg. Patient states 3 days ago, he first noticed his toe nail on the R 3rd digit starting to "lift up" from a "small pimple" underneath. States there was also clear/red oozing. Patient made an appointment with Dr. Araiza for Friday of this week. Yesterday patient developed redness and swelling in the R lower leg and had blood sugars in the 300s which prompted him to come to the ED. Patient has peripheral neuropathy and states he has no pain in his legs. Denies any fever, chills, SOB, CP, N/V.   Of note, patient was admitted to [facility] last August for osteomyelitis and had left 3rd toe distal digit partial amputation and right foot distal digit 2nd digit on 8/27/20.     In the ED:   VS: T 98F, HR 76, /75, RR 16, SpO2 96% on RA  Labs: glucose 361, alk phos 147, UA neg, covid-19 neg  US duplex venous LE: neg for DVT   EKG: Sinus bradycardia with 1st degree AV block (unchanged from 8/2020)  XR R foot: cortical erosions of right 3rd distal phalanx  CXR: pending official read   S/p Vancomycin 1g IV, Rocephin 2g IV, 1L NS IV bolus    ---  HOSPITAL COURSE: Pt was admitted to [facility] for osteomyelitis and surrounding cellulitis of the right 3rd distal phalanx. Covid-19 PCR on admission resulted negative. Infectious Disease was consulted and the patient was given 1 dose of Linezolid. Patient was also started on a course of IV Rocephin.  Podiatry was consulted and the patient underwent sharp excisional debridement of right third digit wound, b/l hallux HPK lesion. Tissue cultures were sent and grew Staphylococcus lugdunensis. MRSA swab, staph aureus PCR both resulted negative. Vascular surgery was consulted and, given there was no evidence of hemodynamically significant lower extremity arterial disease - patient noted to have palpable pulses in the right foot, had no objection to surgical intervention. Cardiology was consulted and optimized patient for OR. On day 2 of hospital course, patient had a partial amputation of third ray of right foot with Podiatrist Dr. Araiza. Patient tolerated procedure well, no complications reported. OR tissue cultures -- Pathology -- MRSA swab --    For CKD s/p renal transplant, Nephrology was consulted, patient continued on Bactrim, tacrolimus and azathioprine.     For DMT2 with insulin pump, Endocrinology was consulted. HbA1c 10.1%. The insulin pump was disconnected and the patient was started on low dose ISS, Lantus 25 units qhs and admelog 8 units TID before meals. Blood glucose remained poorly controlled and Lantus increased to 40 units qhs, admelog increased to 13 units TID before meals. Insulin sliding sale changed to moderate dose.    Physical therapy was consulted and recommended the patient be discharge home.   Patient showed improvement throughout hospitalization. Patient was seen and examined on day of discharge. Pt to have close outpatient f/u with ---. Pt is to be WBAT with surgical shoe.     VITALS:     PHYSICAL EXAM:    Patient was medically optimized for discharge to ---- with close outpatient follow up.    ---  CONSULTANTS:   Vascular Surgery- Dr. Balderas  Podiatry- Dr. Araiza  Cardiology- Dr. Kelly  Infectious Disease- Dr. Saucedo  Endocrinology- Dr. Craig Perlman   Nephrology- Dr. Hilton     ---  TIME SPENT:  I, the attending physician, was physically present for the key portions of the evaluation and management (E/M) service provided. The total amount of time spent reviewing the hospital notes, laboratory values, imaging findings, assessing/counseling the patient, discussing with consultant physicians, social work, nursing staff was -- minutes    ---  FINAL DISCHARGE DIAGNOSIS LIST:  Please see last daily progress note for final discharge diagnoses    ---  Primary care provider was made aware of plan for discharge:      [  ] NO     [ x ] YES   FROM ADMISSION HPI:  54 YO M with hx CKD s/p renal transplant, CVA, DM, neuropathy, DVT, HLD, Hypertension ,Obesity, squamous cell carcinoma, presents to ED for redness/swelling R leg. Patient states 3 days ago, he first noticed his toe nail on the R 3rd digit starting to "lift up" from a "small pimple" underneath. States there was also clear/red oozing. Patient made an appointment with Dr. Araiza for Friday of this week. Yesterday patient developed redness and swelling in the R lower leg and had blood sugars in the 300s which prompted him to come to the ED. Patient has peripheral neuropathy and states he has no pain in his legs. Denies any fever, chills, SOB, CP, N/V.   Of note, patient was admitted to [facility] last August for osteomyelitis and had left 3rd toe distal digit partial amputation and right foot distal digit 2nd digit on 8/27/20.     In the ED:   VS: T 98F, HR 76, /75, RR 16, SpO2 96% on RA  Labs: glucose 361, alk phos 147, UA neg, covid-19 neg  US duplex venous LE: neg for DVT   EKG: Sinus bradycardia with 1st degree AV block (unchanged from 8/2020)  XR R foot: cortical erosions of right 3rd distal phalanx  CXR: pending official read   S/p Vancomycin 1g IV, Rocephin 2g IV, 1L NS IV bolus    ---  HOSPITAL COURSE: Pt was admitted to [facility] for osteomyelitis and surrounding cellulitis of the right 3rd distal phalanx. Covid-19 PCR on admission resulted negative. Infectious Disease was consulted and the patient was given 1 dose of Linezolid. Patient was also started on a course of IV Rocephin.  Podiatry was consulted and the patient underwent sharp excisional debridement of right third digit wound, b/l hallux HPK lesion. Tissue cultures were sent and grew Staphylococcus lugdunensis. MRSA swab, staph aureus PCR both resulted negative. Vascular surgery was consulted and, given there was no evidence of hemodynamically significant lower extremity arterial disease - patient noted to have palpable pulses in the right foot, had no objection to surgical intervention. Cardiology was consulted and optimized patient for OR. On day 2 of hospital course, patient had a partial amputation of third ray of right foot with Podiatrist Dr. Araiza. Patient tolerated procedure well, no complications reported. OR tissue cultures -- Pathology --    For CKD s/p renal transplant, Nephrology was consulted, patient continued on Bactrim, tacrolimus and azathioprine.     For DMT2 with insulin pump, Endocrinology was consulted. HbA1c 10.1%. The insulin pump was disconnected and the patient was started on low dose ISS, Lantus 25 units qhs and admelog 8 units TID before meals. Blood glucose remained poorly controlled and Lantus increased to 40 units qhs, admelog increased to 13 units TID before meals. Insulin sliding sale changed to moderate dose.    Physical therapy was consulted and recommended the patient be discharged home.   Patient showed improvement throughout hospitalization. Patient was seen and examined on day of discharge. Pt to have close outpatient f/u with podiatry. Pt is to be WBAT with surgical shoe.     ---  CONSULTANTS:   Vascular Surgery- Dr. Balderas  Podiatry- Dr. Araiza  Cardiology- Dr. Kelly  Infectious Disease- Dr. Saucedo  Endocrinology- Dr. Craig Perlman   Nephrology- Dr. Hilton    FROM ADMISSION HPI:  56 YO M with hx CKD s/p renal transplant, CVA, DM, neuropathy, DVT, HLD, Hypertension ,Obesity, squamous cell carcinoma, presents to ED for redness/swelling R leg. Patient states 3 days ago, he first noticed his toe nail on the R 3rd digit starting to "lift up" from a "small pimple" underneath. States there was also clear/red oozing. Patient made an appointment with Dr. Araiza for Friday of this week. Yesterday patient developed redness and swelling in the R lower leg and had blood sugars in the 300s which prompted him to come to the ED. Patient has peripheral neuropathy and states he has no pain in his legs. Denies any fever, chills, SOB, CP, N/V.   Of note, patient was admitted to [facility] last August for osteomyelitis and had left 3rd toe distal digit partial amputation and right foot distal digit 2nd digit on 8/27/20.     In the ED:   VS: T 98F, HR 76, /75, RR 16, SpO2 96% on RA  Labs: glucose 361, alk phos 147, UA neg, covid-19 neg  US duplex venous LE: neg for DVT   EKG: Sinus bradycardia with 1st degree AV block (unchanged from 8/2020)  XR R foot: cortical erosions of right 3rd distal phalanx  CXR: pending official read   S/p Vancomycin 1g IV, Rocephin 2g IV, 1L NS IV bolus    ---  HOSPITAL COURSE: Pt was admitted to [facility] for osteomyelitis and surrounding cellulitis of the right 3rd distal phalanx. Covid-19 PCR on admission resulted negative. Infectious Disease was consulted and the patient was given 1 dose of Linezolid. Patient was also started on a course of IV Rocephin.  Podiatry was consulted and the patient underwent sharp excisional debridement of right third digit wound, b/l hallux HPK lesion. Tissue cultures were sent and grew Staphylococcus lugdunensis. MRSA swab, staph aureus PCR both resulted negative. Vascular surgery was consulted and, given there was no evidence of hemodynamically significant lower extremity arterial disease - patient noted to have palpable pulses in the right foot, had no objection to surgical intervention. Cardiology was consulted and optimized patient for OR. On day 2 of hospital course, patient had a partial amputation of third ray of right foot with Podiatrist Dr. Araiza. Patient tolerated procedure well, no complications reported. OR tissue cultures showed mild chronic osteo of distal phalanx and no osteo of middle phalanx. Patient to be discharged on keflex until 2/24    For CKD s/p renal transplant, Nephrology was consulted, patient continued on Bactrim, tacrolimus and azathioprine.     For DMT2 with insulin pump, Endocrinology was consulted. HbA1c 10.1%. The insulin pump was disconnected and the patient was started on low dose ISS, Lantus 25 units qhs and admelog 8 units TID before meals. Blood glucose remained poorly controlled and Lantus increased to 40 units qhs, admelog increased to 13 units TID before meals. Insulin sliding sale changed to moderate dose.    Physical therapy was consulted and recommended the patient be discharged home.   Patient showed improvement throughout hospitalization. Patient was seen and examined on day of discharge. Pt to have close outpatient f/u with podiatry. Pt is to be WBAT with surgical shoe.     ---  CONSULTANTS:   Vascular Surgery- Dr. Balderas  Podiatry- Dr. Araiza  Cardiology- Dr. Kelly  Infectious Disease- Dr. Saucedo  Endocrinology- Dr. Craig Perlman   Nephrology- Dr. Hilton    FROM ADMISSION HPI:  56 YO M with hx CKD s/p renal transplant, CVA, DM, neuropathy, DVT, HLD, Hypertension ,Obesity, squamous cell carcinoma, presents to ED for redness/swelling R leg. Patient states 3 days ago, he first noticed his toe nail on the R 3rd digit starting to "lift up" from a "small pimple" underneath. States there was also clear/red oozing. Patient made an appointment with Dr. Araiza for Friday of this week. Yesterday patient developed redness and swelling in the R lower leg and had blood sugars in the 300s which prompted him to come to the ED. Patient has peripheral neuropathy and states he has no pain in his legs. Denies any fever, chills, SOB, CP, N/V.   Of note, patient was admitted to [facility] last August for osteomyelitis and had left 3rd toe distal digit partial amputation and right foot distal digit 2nd digit on 8/27/20.     In the ED:   VS: T 98F, HR 76, /75, RR 16, SpO2 96% on RA  Labs: glucose 361, alk phos 147, UA neg, covid-19 neg  US duplex venous LE: neg for DVT   EKG: Sinus bradycardia with 1st degree AV block (unchanged from 8/2020)  XR R foot: cortical erosions of right 3rd distal phalanx  CXR: pending official read   S/p Vancomycin 1g IV, Rocephin 2g IV, 1L NS IV bolus    ---  HOSPITAL COURSE: Pt was admitted to [facility] for osteomyelitis and surrounding cellulitis of the right 3rd distal phalanx. Covid-19 PCR on admission resulted negative. Infectious Disease was consulted and the patient was given 1 dose of Linezolid. Patient was also started on a course of IV Rocephin.  Podiatry was consulted and the patient underwent sharp excisional debridement of right third digit wound, b/l hallux HPK lesion. Tissue cultures were sent and grew Staphylococcus lugdunensis. MRSA swab, staph aureus PCR both resulted negative. Vascular surgery was consulted and, given there was no evidence of hemodynamically significant lower extremity arterial disease - patient noted to have palpable pulses in the right foot, had no objection to surgical intervention. Cardiology was consulted and optimized patient for OR. On day 2 of hospital course, patient had a partial amputation of third ray of right foot with Podiatrist Dr. Araiza. Patient tolerated procedure well, no complications reported. OR tissue cultures showed mild chronic osteo of distal phalanx and no osteo of middle phalanx. Patient to be discharged on keflex until 2/24    For CKD s/p renal transplant, Nephrology was consulted, patient continued on Bactrim, tacrolimus and azathioprine.     For DMT2 with insulin pump, Endocrinology was consulted. HbA1c 10.1%. The insulin pump was disconnected and the patient was started on low dose ISS, Lantus 25 units qhs and admelog 8 units TID before meals. Blood glucose remained poorly controlled and Lantus increased to 50units qhs, admelog increased to 15 units TID before meals. Insulin sliding sale changed to moderate dose.    Physical therapy was consulted and recommended the patient be discharged home.   Patient showed improvement throughout hospitalization. Patient was seen and examined on day of discharge. Pt to have close outpatient f/u with podiatry. Pt is to be WBAT with surgical shoe.     ---  CONSULTANTS:   Vascular Surgery- Dr. Balderas  Podiatry- Dr. Araiza  Cardiology- Dr. Kelly  Infectious Disease- Dr. Saucedo  Endocrinology- Dr. Craig Perlman   Nephrology- Dr. Hilton    FROM ADMISSION HPI:  56 YO M with hx CKD s/p renal transplant, CVA, DM, neuropathy, DVT, HLD, Hypertension ,Obesity, squamous cell carcinoma, presents to ED for redness/swelling R leg. Patient states 3 days ago, he first noticed his toe nail on the R 3rd digit starting to "lift up" from a "small pimple" underneath. States there was also clear/red oozing. Patient made an appointment with Dr. Araiza for Friday of this week. Yesterday patient developed redness and swelling in the R lower leg and had blood sugars in the 300s which prompted him to come to the ED. Patient has peripheral neuropathy and states he has no pain in his legs. Denies any fever, chills, SOB, CP, N/V.   Of note, patient was admitted to [facility] last August for osteomyelitis and had left 3rd toe distal digit partial amputation and right foot distal digit 2nd digit on 8/27/20.     In the ED:   VS: T 98F, HR 76, /75, RR 16, SpO2 96% on RA  Labs: glucose 361, alk phos 147, UA neg, covid-19 neg  US duplex venous LE: neg for DVT   EKG: Sinus bradycardia with 1st degree AV block (unchanged from 8/2020)  XR R foot: cortical erosions of right 3rd distal phalanx  CXR: pending official read   S/p Vancomycin 1g IV, Rocephin 2g IV, 1L NS IV bolus    ---  HOSPITAL COURSE: Pt was admitted to [facility] for osteomyelitis and surrounding cellulitis of the right 3rd distal phalanx. Covid-19 PCR on admission resulted negative. Infectious Disease was consulted and the patient was given 1 dose of Linezolid. Patient was also started on a course of IV Rocephin.  Podiatry was consulted and the patient underwent sharp excisional debridement of right third digit wound, b/l hallux HPK lesion. Tissue cultures were sent and grew Staphylococcus lugdunensis. MRSA swab, staph aureus PCR both resulted negative. Vascular surgery was consulted and, given there was no evidence of hemodynamically significant lower extremity arterial disease - patient noted to have palpable pulses in the right foot, had no objection to surgical intervention. Cardiology was consulted and optimized patient for OR. On day 2 of hospital course, patient had a partial amputation of third ray of right foot with Podiatrist Dr. Araiza. Patient tolerated procedure well, no complications reported. OR tissue cultures showed mild chronic osteo of distal phalanx and no osteo of middle phalanx. Patient to be discharged on keflex until 2/24    For CKD s/p renal transplant, Nephrology was consulted, patient continued on Bactrim, tacrolimus and azathioprine.     For DMT2 with insulin pump, Endocrinology was consulted. HbA1c 10.1%. The insulin pump was disconnected and the patient was started on low dose ISS, Lantus 25 units qhs and admelog 8 units TID before meals. Blood glucose remained poorly controlled and Lantus increased to 50units qhs, admelog increased to 15 units TID before meals. Insulin sliding sale changed to moderate dose.    Physical therapy was consulted and recommended the patient be discharged home.   Patient showed improvement throughout hospitalization. Patient was seen and examined on day of discharge. Pt to have close outpatient f/u with podiatry. Pt is to be WBAT with surgical shoe. Pt going home on Keflex 4x day.    ---  CONSULTANTS:   Vascular Surgery- Dr. Balderas  Podiatry- Dr. Araiza  Cardiology- Dr. Kelly  Infectious Disease- Dr. Saucedo  Endocrinology- Dr. Craig Perlman   Nephrology- Dr. Hilton

## 2021-02-11 NOTE — PROGRESS NOTE ADULT - SUBJECTIVE AND OBJECTIVE BOX
Patient is a 55y old  Male who presents with a chief complaint of osteomyelitis (2021 14:24)     56 YO M with hx CKD s/p renal transplant, CVA, DM, neuropathy, DVT, HLD, Hypertension ,Obesity, squamous cell carcinoma, presents to ED for redness/swelling R leg. Patient states 3 days ago, he first noticed his toe nail on the R 3rd digit starting to "lift up" from a "small pimple" underneath. States there was also clear/red oozing. Patient made an appointment with Dr. Araiza for Friday of this week. Yesterday patient developed redness and swelling in the R lower leg and had blood sugars in the 300s which prompted him to come to the ED. Patient has peripheral neuropathy and states he has no pain in his legs. Denies any fever, chills, SOB, CP, N/V.     Of note, patient was admitted to [facility] last August for osteomyelitis and had left 3rd toe distal digit partial amputation and right foot distal digit 2nd digit on 20.       In the ED:   VS: T 98F, HR 76, /75, RR 16, SpO2 96% on RA  Labs: glucose 361, alk phos 147, UA neg, covid-19 neg  US duplex venous LE: neg for DVT   EKG: Sinus bradycardia with 1st degree AV block (unchanged from 2020)  XR R foot: cortical erosions of right 3rd distal phalanx  CXR: pending official read     S/p Vancomycin 1g IV, Rocephin 2g IV, 1L NS IV bolus     INTERVAL HPI:    21 - Pt seen and examined at bedside, s/p partial amputation of third digit of right foot. Patient states he feels well, no complaints. Erythema and swelling on RLE are improving on rocephin.     OVERNIGHT EVENTS: none     Home Medications:  aspirin 325 mg oral tablet: 1 tab(s) orally once a day (10 Feb 2021 15:40)  azaTHIOprine 100 mg oral tablet: 1 tab(s) orally once a day (10 Feb 2021 15:40)  cloNIDine 0.1 mg oral tablet: 1 tab(s) orally 2 times a day (10 Feb 2021 15:40)  famotidine 20 mg oral tablet: 1 tab(s) orally once a day (10 Feb 2021 15:40)  gabapentin 300 mg oral capsule: 1 cap(s) orally 2 times a day (10 Feb 2021 15:40)  HumaLOG 100 units/mL injectable solution: 3 unit(s) injectable every hour via insulin pump. TDD: 72 units. (10 Feb 2021 15:40)  hydrALAZINE 25 mg oral tablet: 1 tab(s) orally 3 times a day (10 Feb 2021 15:40)  losartan 25 mg oral tablet: 1 tab(s) orally once a day (10 Feb 2021 15:40)  lovastatin 40 mg oral tablet: 1 tab(s) orally once a day (10 Feb 2021 15:40)  Metoprolol Tartrate 25 mg oral tablet: 3 tab(s) orally 2 times a day    *conf. with patient and pharmacy. 75mg dose, BID* (10 Feb 2021 15:40)  sulfamethoxazole-trimethoprim 400 mg-80 mg oral tablet: 1 tab(s) orally once a day (10 Feb 2021 15:40)  tacrolimus: 1.5 milligram(s) orally 2 times a day (10 Feb 2021 15:40)      MEDICATIONS  (STANDING):  atorvastatin 10 milliGRAM(s) Oral at bedtime  azaTHIOprine 100 milliGRAM(s) Oral daily  cefTRIAXone   IVPB 2000 milliGRAM(s) IV Intermittent every 24 hours  cloNIDine 0.1 milliGRAM(s) Oral two times a day  dextrose 40% Gel 15 Gram(s) Oral once  dextrose 5%. 1000 milliLiter(s) (50 mL/Hr) IV Continuous <Continuous>  dextrose 5%. 1000 milliLiter(s) (100 mL/Hr) IV Continuous <Continuous>  dextrose 50% Injectable 25 Gram(s) IV Push once  dextrose 50% Injectable 25 Gram(s) IV Push once  dextrose 50% Injectable 12.5 Gram(s) IV Push once  famotidine    Tablet 20 milliGRAM(s) Oral daily  gabapentin 300 milliGRAM(s) Oral two times a day  glucagon  Injectable 1 milliGRAM(s) IntraMuscular once  hydrALAZINE 25 milliGRAM(s) Oral three times a day  insulin glargine Injectable (LANTUS) 35 Unit(s) SubCutaneous at bedtime  insulin lispro (ADMELOG) corrective regimen sliding scale   SubCutaneous three times a day before meals  insulin lispro (ADMELOG) corrective regimen sliding scale   SubCutaneous at bedtime  insulin lispro Injectable (ADMELOG) 10 Unit(s) SubCutaneous three times a day before meals  losartan 25 milliGRAM(s) Oral daily  metoprolol tartrate 75 milliGRAM(s) Oral two times a day  tacrolimus 1.5 milliGRAM(s) Oral two times a day  trimethoprim   80 mG/sulfamethoxazole 400 mG 1 Tablet(s) Oral daily    MEDICATIONS  (PRN):      No Known Allergies      Social History:  , lives with wife and 3 kids, retired, social drinker, denies tobacco or recreational drug use, independent in ADLs, walks without assistance (10 Feb 2021 15:11)      REVIEW OF SYSTEMS:  CONSTITUTIONAL: No fever, No chills, No fatigue, No myalgia, No Body ache, No Weakness  EYES: No eye pain,  No visual disturbances, No discharge, No Redness  ENMT: No ear pain, No nose bleed, No vertigo; No sinus pain, No throat pain, No Congestion  NECK: No pain, No stiffness  RESPIRATORY: No cough, No wheezing, No hemoptysis, No shortness of breath  CARDIOVASCULAR: No chest pain, No palpitations  GASTROINTESTINAL: No abdominal pain, No epigastric pain. No nausea, No vomiting, No diarrhea, No constipation; [  ] BM  GENITOURINARY: No dysuria, No frequency, No urgency, No hematuria, No incontinence  NEUROLOGICAL: No headaches, No dizziness, No numbness, No tingling, No tremors, No weakness  EXTREMITIES: No Swelling, No Pain, No Edema  SKIN: [x  ] No itching, burning, rashes, or lesions   MUSCULOSKELETAL: No joint pain, No joint swelling; No muscle pain, No back pain, No extremity pain  PSYCHIATRIC: No depression, No anxiety, No mood swings, No difficulty sleeping at night  PAIN SCALE: [ x ] None  [  ] Other-  ROS Unable to obtain due to: [  ] Dementia  [  ] Lethargy  [  ] Sedated  [  ] Non verbal  REST OF REVIEW OF SYSTEMS: [ x ] Normal     Vital Signs Last 24 Hrs  T(C): 36.9 (2021 09:39), Max: 37.1 (10 Feb 2021 21:19)  T(F): 98.4 (2021 09:39), Max: 98.8 (10 Feb 2021 21:19)  HR: 60 (2021 10:48) (58 - 75)  BP: 133/60 (2021 10:48) (109/64 - 177/91)  BP(mean): --  RR: 20 (2021 10:48) (13 - 20)  SpO2: 98% (2021 10:48) (92% - 98%)      Finger Stick      I&O's Summary    PHYSICAL EXAM:  GENERAL:  [ x ] NAD, [x  ] Well appearing, [  ] Agitated, [  ] Drowsy, [  ] Lethargy, [  ] Confused   HEAD:  [x  ] Normal, [  ] Other  EYES:  [ x ] EOMI, [ x ] PERRLA, [x  ] Conjunctiva and sclera clear normal, [  ] Other, [  ] Pallor, [  ] Discharge  ENMT:  [ x ] Normal, [x  ] Moist mucous membranes, [x  ] Good dentition, [x  ] No thrush  NECK:  [x  ] Supple, [x  ] No JVD, [x  ] Normal thyroid, [  ] Lymphadenopathy, [  ] Other  CHEST/LUNG:  [ x ] Clear to auscultation bilaterally, [x  ] Breath Sounds equal B/L  [  ] Poor effort, [x  ] No rales, [x  ] No rhonchi, [ x ] No wheezing  HEART:  [x  ] Regular rate and rhythm, [  ] Tachycardia, [  ] Bradycardia, [  ] Irregular, [ x ] No murmurs, No rubs, No gallops, [  ] PPM in place (Mfr:  )  ABDOMEN:  [x  ] Soft, [ x ] Nontender, [x  ] Nondistended, [ x ] No mass, [ x ] Bowel sounds present, [  ] Obese  NERVOUS SYSTEM:  [x  ] Alert & Oriented x3, [  ] Nonfocal, [  ] Confusion, [  ] Encephalopathic, [  ] Sedated, [  ] Unable to assess, [  ] Dementia, [  ] Other-  EXTREMITIES:  [ x ] 2+ Peripheral Pulses, No clubbing, No cyanosis,  [  ] Edema B/L lower EXT, [  ] PVD stasis skin changes B/L lower EXT, [  ] Wound  LYMPH:  No lymphadenopathy noted  SKIN:  [  ] No rashes or lesions, [  ] Pressure ulcers, [  ] Ecchymosis, [  ] Skin tears, [ x ] erythema and edema RLE from ankle to mid shin, improving     DIET: Diet, DASH/TLC:   Sodium & Cholesterol Restricted  Consistent Carbohydrate Evening Snack (21 @ 09:19)      LABS:                        13.9   7.00  )-----------( 198      ( 2021 07:11 )             41.6     2021 07:11    139    |  104    |  19     ----------------------------<  306    4.4     |  30     |  1.10     Ca    8.7        2021 07:11    TPro  7.0    /  Alb  3.3    /  TBili  0.6    /  DBili  x      /  AST  15     /  ALT  21     /  AlkPhos  120    2021 07:11    PT/INR - ( 10 Feb 2021 10:11 )   PT: 11.2 sec;   INR: 0.96 ratio         PTT - ( 10 Feb 2021 10:11 )  PTT:30.5 sec  Urinalysis Basic - ( 10 Feb 2021 12:02 )    Color: Pale Yellow / Appearance: Slightly Turbid / S.010 / pH: x  Gluc: x / Ketone: Negative  / Bili: Negative / Urobili: Negative   Blood: x / Protein: 30 mg/dL / Nitrite: Negative   Leuk Esterase: Negative / RBC: 6-10 /HPF / WBC x   Sq Epi: x / Non Sq Epi: Occasional / Bacteria: x        culture blood  -- .Tissue Other, right foot  @ 01:25    culture urine  --   @ 01:25          Culture - Tissue with Gram Stain (collected 2021 01:25)  Source: .Tissue Other, right foot  Gram Stain (2021 06:43):    No polymorphonuclear cells seen per low power field    No organisms seen per oil power field       Anemia Panel:      Thyroid Panel:                RADIOLOGY & ADDITIONAL TESTS:      HEALTH ISSUES - PROBLEM Dx:  HLD (hyperlipidemia)  HLD (hyperlipidemia)    Neuropathy  Neuropathy    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    HTN (hypertension)  HTN (hypertension)    Need for prophylactic measure  Need for prophylactic measure    Renal transplant recipient  Renal transplant recipient    CKD (chronic kidney disease)  CKD (chronic kidney disease)    CVA (cerebral vascular accident)  CVA (cerebral vascular accident)    Diabetes mellitus  Diabetes mellitus    Cellulitis  Cellulitis    Osteomyelitis of right foot, unspecified type  Osteomyelitis of right foot, unspecified type          Consultant(s) Notes Reviewed:  [  ] YES     Care Discussed with [ x ] Consultants, [ x ] Patient, [  ] Family, [  ] HCP, [  ] , [  ] Social Service, [x  ] RN, [  ] Physical Therapy, [  ] Palliative Care Team  DVT PPX: [  ] Lovenox, [  ] SC Heparin, [  ] Coumadin, [  ] Xarelto, [  ] Eliquis, [  ] Pradaxa, [  ] IV Heparin drip, [ x ] SCD, [  ] Ambulation, [  ] Contraindicated 2/2 GI Bleed, [  ] Contraindicated 2/2  Bleed, [  ] Contraindicated 2/2 Brain Bleed  Advanced Directive: [  ] None, [  ] DNR/DNI Patient is a 55y old  Male who presents with a chief complaint of osteomyelitis (2021 14:24)     56 YO M with hx CKD s/p renal transplant, CVA, DM, neuropathy, DVT, HLD, Hypertension ,Obesity, squamous cell carcinoma, presents to ED for redness/swelling R leg. Patient states 3 days ago, he first noticed his toe nail on the R 3rd digit starting to "lift up" from a "small pimple" underneath. States there was also clear/red oozing. Patient made an appointment with Dr. Araiza for Friday of this week. Yesterday patient developed redness and swelling in the R lower leg and had blood sugars in the 300s which prompted him to come to the ED. Patient has peripheral neuropathy and states he has no pain in his legs. Denies any fever, chills, SOB, CP, N/V.     Of note, patient was admitted to [facility] last August for osteomyelitis and had left 3rd toe distal digit partial amputation and right foot distal digit 2nd digit on 20.       In the ED:   VS: T 98F, HR 76, /75, RR 16, SpO2 96% on RA  Labs: glucose 361, alk phos 147, UA neg, covid-19 neg  US duplex venous LE: neg for DVT   EKG: Sinus bradycardia with 1st degree AV block (unchanged from 2020)  XR R foot: cortical erosions of right 3rd distal phalanx  CXR: pending official read     S/p Vancomycin 1g IV, Rocephin 2g IV, 1L NS IV bolus     INTERVAL HPI:    21 - Pt seen and examined at bedside, s/p partial amputation of third digit of right foot. Patient states he feels well, no complaints. Erythema and swelling on RLE are improving on rocephin.     OVERNIGHT EVENTS: none     Home Medications:  aspirin 325 mg oral tablet: 1 tab(s) orally once a day (10 Feb 2021 15:40)  azaTHIOprine 100 mg oral tablet: 1 tab(s) orally once a day (10 Feb 2021 15:40)  cloNIDine 0.1 mg oral tablet: 1 tab(s) orally 2 times a day (10 Feb 2021 15:40)  famotidine 20 mg oral tablet: 1 tab(s) orally once a day (10 Feb 2021 15:40)  gabapentin 300 mg oral capsule: 1 cap(s) orally 2 times a day (10 Feb 2021 15:40)  HumaLOG 100 units/mL injectable solution: 3 unit(s) injectable every hour via insulin pump. TDD: 72 units. (10 Feb 2021 15:40)  hydrALAZINE 25 mg oral tablet: 1 tab(s) orally 3 times a day (10 Feb 2021 15:40)  losartan 25 mg oral tablet: 1 tab(s) orally once a day (10 Feb 2021 15:40)  lovastatin 40 mg oral tablet: 1 tab(s) orally once a day (10 Feb 2021 15:40)  Metoprolol Tartrate 25 mg oral tablet: 3 tab(s) orally 2 times a day    *conf. with patient and pharmacy. 75mg dose, BID* (10 Feb 2021 15:40)  sulfamethoxazole-trimethoprim 400 mg-80 mg oral tablet: 1 tab(s) orally once a day (10 Feb 2021 15:40)  tacrolimus: 1.5 milligram(s) orally 2 times a day (10 Feb 2021 15:40)      MEDICATIONS  (STANDING):  atorvastatin 10 milliGRAM(s) Oral at bedtime  azaTHIOprine 100 milliGRAM(s) Oral daily  cefTRIAXone   IVPB 2000 milliGRAM(s) IV Intermittent every 24 hours  cloNIDine 0.1 milliGRAM(s) Oral two times a day  dextrose 40% Gel 15 Gram(s) Oral once  dextrose 5%. 1000 milliLiter(s) (50 mL/Hr) IV Continuous <Continuous>  dextrose 5%. 1000 milliLiter(s) (100 mL/Hr) IV Continuous <Continuous>  dextrose 50% Injectable 25 Gram(s) IV Push once  dextrose 50% Injectable 25 Gram(s) IV Push once  dextrose 50% Injectable 12.5 Gram(s) IV Push once  famotidine    Tablet 20 milliGRAM(s) Oral daily  gabapentin 300 milliGRAM(s) Oral two times a day  glucagon  Injectable 1 milliGRAM(s) IntraMuscular once  hydrALAZINE 25 milliGRAM(s) Oral three times a day  insulin glargine Injectable (LANTUS) 35 Unit(s) SubCutaneous at bedtime  insulin lispro (ADMELOG) corrective regimen sliding scale   SubCutaneous three times a day before meals  insulin lispro (ADMELOG) corrective regimen sliding scale   SubCutaneous at bedtime  insulin lispro Injectable (ADMELOG) 10 Unit(s) SubCutaneous three times a day before meals  losartan 25 milliGRAM(s) Oral daily  metoprolol tartrate 75 milliGRAM(s) Oral two times a day  tacrolimus 1.5 milliGRAM(s) Oral two times a day  trimethoprim   80 mG/sulfamethoxazole 400 mG 1 Tablet(s) Oral daily    MEDICATIONS  (PRN):      No Known Allergies      Social History:  , lives with wife and 3 kids, retired, social drinker, denies tobacco or recreational drug use, independent in ADLs, walks without assistance (10 Feb 2021 15:11)      REVIEW OF SYSTEMS: i am OK  CONSTITUTIONAL: No fever, No chills, No fatigue, No myalgia, No Body ache, No Weakness  EYES: No eye pain,  No visual disturbances, No discharge, No Redness  ENMT: No ear pain, No nose bleed, No vertigo; No sinus pain, No throat pain, No Congestion  NECK: No pain, No stiffness  RESPIRATORY: No cough, No wheezing, No hemoptysis, No shortness of breath  CARDIOVASCULAR: No chest pain, No palpitations  GASTROINTESTINAL: No abdominal pain, No epigastric pain. No nausea, No vomiting, No diarrhea, No constipation; [  ] BM  GENITOURINARY: No dysuria, No frequency, No urgency, No hematuria, No incontinence  NEUROLOGICAL: No headaches, No dizziness, No numbness, No tingling, No tremors, No weakness  EXTREMITIES: No Swelling, No Pain, No Edema  SKIN: [x  ] No itching, burning, rashes, or lesions   MUSCULOSKELETAL: No joint pain, No joint swelling; No muscle pain, No back pain, No extremity pain  PSYCHIATRIC: No depression, No anxiety, No mood swings, No difficulty sleeping at night  PAIN SCALE: [ x ] None  [  ] Other-  ROS Unable to obtain due to: [  ] Dementia  [  ] Lethargy  [  ] Sedated  [  ] Non verbal  REST OF REVIEW OF SYSTEMS: [ x ] Normal     Vital Signs Last 24 Hrs  T(C): 36.9 (2021 09:39), Max: 37.1 (10 Feb 2021 21:19)  T(F): 98.4 (2021 09:39), Max: 98.8 (10 Feb 2021 21:19)  HR: 60 (2021 10:48) (58 - 75)  BP: 133/60 (2021 10:48) (109/64 - 177/91)  BP(mean): --  RR: 20 (2021 10:48) (13 - 20)  SpO2: 98% (2021 10:48) (92% - 98%)      Finger Stick      I&O's Summary    PHYSICAL EXAM:  GENERAL:  [ x ] NAD, [x  ] Well appearing, [  ] Agitated, [  ] Drowsy, [  ] Lethargy, [  ] Confused   HEAD:  [x  ] Normal, [  ] Other  EYES:  [ x ] EOMI, [ x ] PERRLA, [x  ] Conjunctiva and sclera clear normal, [  ] Other, [  ] Pallor, [  ] Discharge  ENMT:  [ x ] Normal, [x  ] Moist mucous membranes, [x  ] Good dentition, [x  ] No thrush  NECK:  [x  ] Supple, [x  ] No JVD, [x  ] Normal thyroid, [  ] Lymphadenopathy, [  ] Other  CHEST/LUNG:  [ x ] Clear to auscultation bilaterally, [x  ] Breath Sounds equal B/L  [  ] Poor effort, [x  ] No rales, [x  ] No rhonchi, [ x ] No wheezing  HEART:  [x  ] Regular rate and rhythm, [  ] Tachycardia, [  ] Bradycardia, [  ] Irregular, [ x ] No murmurs, No rubs, No gallops, [  ] PPM in place (Mfr:  )  ABDOMEN:  [x  ] Soft, [ x ] Nontender, [x  ] Nondistended, [ x ] No mass, [ x ] Bowel sounds present, [  ] Obese  NERVOUS SYSTEM:  [x  ] Alert & Oriented x3, [ x ] Nonfocal, [  ] Confusion, [  ] Encephalopathic, [  ] Sedated, [  ] Unable to assess, [  ] Dementia, [  ] Other-  EXTREMITIES:  [ x ] 2+ Peripheral Pulses, No clubbing, No cyanosis,  [ x ] Edema Rt  lower EXT, with improving erythema, warmth  [x  ] PVD stasis skin changes B/L lower EXT, [x  ] Wound dressing Rt Foot, pt has had amputations b/l foot few toes   LYMPH:  No lymphadenopathy noted  SKIN:  [  ] No rashes or lesions, [  ] Pressure ulcers, [  ] Ecchymosis, [  ] Skin tears, [ x ] erythema and edema RLE from ankle to mid shin, improving     DIET: Diet, DASH/TLC:   Sodium & Cholesterol Restricted  Consistent Carbohydrate Evening Snack (21 @ 09:19)      LABS:                        13.9   7.00  )-----------( 198      ( 2021 07:11 )             41.6     2021 07:11    139    |  104    |  19     ----------------------------<  306    4.4     |  30     |  1.10     Ca    8.7        2021 07:11    TPro  7.0    /  Alb  3.3    /  TBili  0.6    /  DBili  x      /  AST  15     /  ALT  21     /  AlkPhos  120    2021 07:11    PT/INR - ( 10 Feb 2021 10:11 )   PT: 11.2 sec;   INR: 0.96 ratio         PTT - ( 10 Feb 2021 10:11 )  PTT:30.5 sec  Urinalysis Basic - ( 10 Feb 2021 12:02 )    Color: Pale Yellow / Appearance: Slightly Turbid / S.010 / pH: x  Gluc: x / Ketone: Negative  / Bili: Negative / Urobili: Negative   Blood: x / Protein: 30 mg/dL / Nitrite: Negative   Leuk Esterase: Negative / RBC: 6-10 /HPF / WBC x   Sq Epi: x / Non Sq Epi: Occasional / Bacteria: x        culture blood  -- .Tissue Other, right foot  @ 01:25    culture urine  --   @ 01:25          Culture - Tissue with Gram Stain (collected 2021 01:25)  Source: .Tissue Other, right foot  Gram Stain (2021 06:43):    No polymorphonuclear cells seen per low power field    No organisms seen per oil power field    RADIOLOGY & ADDITIONAL TESTS: NONE      HEALTH ISSUES - PROBLEM Dx:  HLD (hyperlipidemia)  HLD (hyperlipidemia)    Neuropathy  Neuropathy    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    HTN (hypertension)  HTN (hypertension)    Need for prophylactic measure  Need for prophylactic measure    Renal transplant recipient  Renal transplant recipient    CKD (chronic kidney disease)  CKD (chronic kidney disease)    CVA (cerebral vascular accident)  CVA (cerebral vascular accident)    Diabetes mellitus  Diabetes mellitus    Cellulitis  Cellulitis    Osteomyelitis of right foot, unspecified type  Osteomyelitis of right foot, unspecified type    Consultant(s) Notes Reviewed:  [ x ] YES     Care Discussed with [ x ] Consultants, [ x ] Patient, [  ] Family, [  ] HCP, [  ] , [  ] Social Service, [x  ] RN, [  ] Physical Therapy, [  ] Palliative Care Team  DVT PPX: [  ] Lovenox, [  ] SC Heparin, [  ] Coumadin, [  ] Xarelto, [  ] Eliquis, [  ] Pradaxa, [  ] IV Heparin drip, [ x ] SCD, [  ] Ambulation, [  ] Contraindicated 2/2 GI Bleed, [  ] Contraindicated 2/2  Bleed, [  ] Contraindicated 2/2 Brain Bleed  Advanced Directive: [ x ] None, [  ] DNR/DNI

## 2021-02-11 NOTE — PROGRESS NOTE ADULT - PROBLEM SELECTOR PLAN 2
CHRONIC KIDNEY DISEASE, STAGE 1: s/p kidney transplant   Serum creatinine is stable at 1.3   There is no progression.  No uremic symptoms. No evidence of  worsening  Anemia. Fluid status stable.   Will continue to avoid nephrotoxic drugs.  Patient remains asymptomatic.  Continue current therapy. continue to avoid nephrotoxic drugs.  Patient remains asymptomatic.  Continue current therapy.

## 2021-02-11 NOTE — ADVANCED PRACTICE NURSE CONSULT - ASSESSMENT
Patient is a 55y old  Male who presents with a chief complaint of osteomyelitis (11 Feb 2021 12:06)      Type: 2 DM  uses insulin  pump Novolog  with  basal rate of 3 units/hr.  He self  determines bolus doses, no setting for insulin sensitively  or insulin to carb.  he is being followed by Dr Perlman who will order tonight if patient  wishes to resume.  A1c pending    HPI:     56 YO M presents to ED for redness/swelling R leg. Patient states 3 days ago, he first noticed his toe nail on the R 3rd digit starting to "lift up" from a "small pimple" underneath. States there was also clear/red oozing.  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 1000 milliLiter(s) (50 mL/Hr) IV Continuous  dextrose 5%. 1000 milliLiter(s) (100 mL/Hr) IV Continuous   dextrose 50% Injectable 25 Gram(s) IV Push once  dextrose 50% Injectable 25 Gram(s) IV Push once  dextrose 50% Injectable 12.5 Gram(s) IV Push once  famotidine    Tablet 20 milliGRAM(s) Oral daily  gabapentin 300 milliGRAM(s) Oral two times a day  glucagon  Injectable 1 milliGRAM(s) IntraMuscular once  hydrALAZINE 25 milliGRAM(s) Oral three times a day  insulin glargine Injectable (LANTUS) 35 Unit(s) SubCutaneous at bedtime  insulin lispro (ADMELOG) corrective regimen sliding scale   SubCutaneous three times a day before meals  insulin lispro (ADMELOG) corrective regimen sliding scale   SubCutaneous at bedtime  insulin lispro Injectable (ADMELOG) 10 Unit(s) SubCutaneous three times a day before meals  losartan 25 milliGRAM(s) Oral daily  metoprolol tartrate 75 milliGRAM(s) Oral two times a day  tacrolimus 1.5 milliGRAM(s) Oral two times a day  trimethoprim   80 mG/sulfamethoxazole 400 mG 1 Tablet(s) Oral daily    Allergies    No Known Allergies    Intolerances    Daily Height in cm: 185.4      Vital Signs Last 24 Hrs  T(C): 36.9 (11 Feb 2021 09:39),  T(F): 98.4 (11 Feb 2021 09:39),   HR: 60 (11 Feb 2021 10:48)   BP: 133/60 (11 Feb 2021 10:48)   RR: 20 (11 Feb 2021 10:48)   SpO2: 98% (11 Feb 2021 10:48)     02-11    139  |  104  |  19  ----------------------------<  306<H>  4.4   |  30  |  1.10    eGFR if Non African American: 76 mL/min/1.73M2 (02-11-21 @ 07:11)  Ketone - Urine: Negative (02-10-21 @ 12:02)  eGFR if Non African American: 62 mL/min/1.73M2 (02-10-21 @ 10:11)  Anion Gap, Serum: 6 mmol/L (02-10-21 @ 10:11)      POCT Blood Glucose.: 281 mg/dL (11 Feb 2021 11:04)  POCT Blood Glucose.: 334 mg/dL (11 Feb 2021 09:08)  POCT Blood Glucose.: 334 mg/dL (11 Feb 2021 07:34)  POCT Blood Glucose.: 334 mg/dL (10 Feb 2021 21:35)  POCT Blood Glucose.: 306 mg/dL (10 Feb 2021 17:12)

## 2021-02-11 NOTE — DISCHARGE NOTE PROVIDER - NSDCACTIVITY_GEN_ALL_CORE
Bathing allowed/Do not drive or operate machinery/Walking - Indoors allowed/No heavy lifting/straining

## 2021-02-11 NOTE — DISCHARGE NOTE PROVIDER - PROVIDER TOKENS
PROVIDER:[[id number]],FOLLOWUP:[1 week],ESTABLISHEDPATIENT:[T]],PROVIDER:[TOKEN:[[id number]],FOLLOWUP:[Routine],ESTABLISHEDPATIENT:[T]],PROVIDER:[TOKEN:[[id number]],FOLLOWUP:[Routine],ESTABLISHEDPATIENT:[T]],PROVIDER:[TOKEN:[[id number]],FOLLOWUP:[Routine]] PROVIDER:[TOKEN:[[id number]],FOLLOWUP:[1 week],ESTABLISHEDPATIENT:[T]],PROVIDER:[TOKEN:[[id number]],FOLLOWUP:[Routine],ESTABLISHEDPATIENT:[T]],PROVIDER:[TOKEN:[[id number]],FOLLOWUP:[Routine],ESTABLISHEDPATIENT:[T]],PROVIDER:[[id number]],FOLLOWUP:[Routine]],PROVIDER:[TOKEN:[[id number]]] PROVIDER:[TOKEN:[[id number]],FOLLOWUP:[1 week],ESTABLISHEDPATIENT:[T]],PROVIDER:[TOKEN:[[id number]],FOLLOWUP:[Routine],ESTABLISHEDPATIENT:[T]],PROVIDER:[TOKEN:[[id number]],FOLLOWUP:[Routine],ESTABLISHEDPATIENT:[T]],PROVIDER:[[id number]],FOLLOWUP:[Routine]],PROVIDER:[TOKEN:[[id number]]],PROVIDER:[TOKEN:[[id number]]]

## 2021-02-11 NOTE — PROGRESS NOTE ADULT - PROBLEM SELECTOR PLAN 2
-RLE warmth, swelling, redness below knee x1 day Toe OM  -responding well to IV abx  -s/p vancomycin, Rocephin, IV fluid bolus in ED  -continue Rocephin 2g IV -RLE warmth, swelling, redness below knee x1 day Toe OM  -responding well to IV abx  -s/p vancomycin, Rocephin, IV fluid bolus in ED  -continue Rocephin 2g IV daily

## 2021-02-11 NOTE — CONSULT NOTE ADULT - SUBJECTIVE AND OBJECTIVE BOX
[facility], Division of Infectious Diseases  MEL Mckoy, CASH Ellis  [phone]    LEANN LUTHER  55y, Male  [id number]    HPI--  HPI:   54 YO M with hx CKD s/p renal transplant, CVA, DM, neuropathy, DVT, HLD, Hypertension ,Obesity, squamous cell carcinoma, presents to ED for redness/swelling R leg. Patient states 3 days ago, he first noticed his toe nail on the R 3rd digit starting to "lift up" from a "small pimple" underneath. States there was also clear/red oozing. Patient made an appointment with Dr. Araiza for Friday of this week. Yesterday patient developed redness and swelling in the R lower leg and had blood sugars in the 300s which prompted him to come to the ED. Patient has peripheral neuropathy and states he has no pain in his legs. Denies any fever, chills, SOB, CP, N/V.     Of note, patient was admitted to [facility] last August for osteomyelitis and had left 3rd toe distal digit partial amputation and right foot distal digit 2nd digit on 20.       In the ED:   VS: T 98F, HR 76, /75, RR 16, SpO2 96% on RA  Labs: glucose 361, alk phos 147, UA neg, covid-19 neg  US duplex venous LE: neg for DVT   EKG: Sinus bradycardia with 1st degree AV block (unchanged from 2020)  XR R foot: cortical erosions of right 3rd distal phalanx  CXR: pending official read     S/p Vancomycin 1g IV, Rocephin 2g IV, 1L NS IV bolus  (10 Feb 2021 15:11)  Switched to linezolid as pt will not be able to achieve therapeutic concentration of vancomycin    Seen by podiatry, s/p Partial amputation of third ray of right foot by open approach 2021 09:09:26    Pt seen post-op.   Feeling tired.       Active Medications--  atorvastatin 10 milliGRAM(s) Oral at bedtime  azaTHIOprine 100 milliGRAM(s) Oral daily  cefTRIAXone   IVPB 2000 milliGRAM(s) IV Intermittent every 24 hours  cloNIDine 0.1 milliGRAM(s) Oral two times a day  dextrose 40% Gel 15 Gram(s) Oral once  dextrose 5%. 1000 milliLiter(s) IV Continuous <Continuous>  dextrose 5%. 1000 milliLiter(s) IV Continuous <Continuous>  dextrose 50% Injectable 25 Gram(s) IV Push once  dextrose 50% Injectable 25 Gram(s) IV Push once  dextrose 50% Injectable 12.5 Gram(s) IV Push once  famotidine    Tablet 20 milliGRAM(s) Oral daily  gabapentin 300 milliGRAM(s) Oral two times a day  glucagon  Injectable 1 milliGRAM(s) IntraMuscular once  hydrALAZINE 25 milliGRAM(s) Oral three times a day  insulin glargine Injectable (LANTUS) 25 Unit(s) SubCutaneous at bedtime  insulin lispro (ADMELOG) corrective regimen sliding scale   SubCutaneous three times a day before meals  insulin lispro (ADMELOG) corrective regimen sliding scale   SubCutaneous at bedtime  insulin lispro Injectable (ADMELOG) 8 Unit(s) SubCutaneous three times a day before meals  losartan 25 milliGRAM(s) Oral daily  metoprolol tartrate 75 milliGRAM(s) Oral two times a day  tacrolimus 1.5 milliGRAM(s) Oral two times a day  trimethoprim   80 mG/sulfamethoxazole 400 mG 1 Tablet(s) Oral daily    Antimicrobials:   cefTRIAXone   IVPB 2000 milliGRAM(s) IV Intermittent every 24 hours  trimethoprim   80 mG/sulfamethoxazole 400 mG 1 Tablet(s) Oral daily    Immunologic: azaTHIOprine 100 milliGRAM(s) Oral daily  tacrolimus 1.5 milliGRAM(s) Oral two times a day      ROS:  CONSTITUTIONAL: No fevers or chills. No weakness or headache. No weight changes.  EYES/ENT: No visual or hearing changes. No sore throat or throat pain .  NECK: No pain or stiffness  RESPIRATORY: No cough, wheezing, or hemoptysis. No shortness of breath  CARDIOVASCULAR: No chest pain or palpitations  GASTROINTESTINAL: No abdominal pain. No nausea or vomiting. No diarrhea or constipation.  GENITOURINARY: No dysuria, frequency or hematuria  NEUROLOGICAL: No numbness or weakness  SKIN: No itching or rashes  PSYCHIATRIC: Pleasant. Appropriate affect    Allergies: No Known Allergies    PMH -- Recurrent squamous cell carcinoma of skin    History of DVT (deep vein thrombosis)    Squamous cell carcinoma of skin of left ear    CVA (cerebral vascular accident)    Obesity (BMI 30-39.9)    Diabetic peripheral neuropathy    CKD (chronic kidney disease)    Diabetes mellitus    Hyperlipidemia    Hypertension      PSH -- 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    S/P kidney transplant    End-stage renal failure with renal transplant    H/O foot surgery    Vasectomy status    Ear disease    Toe infection      FH -- FH: skin cancer    Family history of diabetes mellitus (DM)    No pertinent family history in first degree relatives      Social History --  EtOH: denies   Tobacco: denies   Drug Use: denies     Travel/Environmental/Occupational History:    Physical Exam--  Vital Signs Last 24 Hrs  T(F): 98.4 (2021 09:39), Max: 98.8 (10 Feb 2021 21:19)  HR: 60 (2021 10:48) (58 - 76)  BP: 133/60 (2021 10:48) (109/64 - 177/91)  RR: 20 (2021 10:48) (13 - 20)  SpO2: 98% (2021 10:48) (92% - 98%)  General: nontoxic-appearing, no acute distress  HEENT: NC/AT, EOMI, anicteric, conjunctiva pink and moist, oropharynx clear, dentition fair  Neck: Not rigid. No sense of mass. No LAD  Lungs: Clear bilaterally without rales, wheezing or rhonchi  Heart: Regular rate and rhythm. No murmur, rub or gallop.  Abdomen: Soft. Nondistended. Nontender. Bowel sounds present. No organomegaly.  Back: No spinal tenderness. No costovertebral angle tenderness.  Extremities: S/p R foot 3rd ray amputation, dressing c/d/i  Skin: Warm. Dry. Good turgor. No rash. No vasculitic stigmata.      Laboratory & Imaging Data--  CBC:                       13.9   7.00  )-----------( 198      ( 2021 07:11 )             41.6     CMP:     139  |  104  |  19  ----------------------------<  306<H>  4.4   |  30  |  1.10    Ca    8.7      2021 07:11    TPro  7.0  /  Alb  3.3  /  TBili  0.6  /  DBili  x   /  AST  15  /  ALT  21  /  AlkPhos  120  02-11    LIVER FUNCTIONS - ( 2021 07:11 )  Alb: 3.3 g/dL / Pro: 7.0 g/dL / ALK PHOS: 120 U/L / ALT: 21 U/L / AST: 15 U/L / GGT: x           Urinalysis Basic - ( 10 Feb 2021 12:02 )    Color: Pale Yellow / Appearance: Slightly Turbid / S.010 / pH: x  Gluc: x / Ketone: Negative  / Bili: Negative / Urobili: Negative   Blood: x / Protein: 30 mg/dL / Nitrite: Negative   Leuk Esterase: Negative / RBC: 6-10 /HPF / WBC x   Sq Epi: x / Non Sq Epi: Occasional / Bacteria: x        Microbiology: reviewed    Culture - Tissue with Gram Stain (collected 21 @ 01:25)  Source: .Tissue Other, right foot  Gram Stain (21 @ 06:43):    No polymorphonuclear cells seen per low power field    No organisms seen per oil power field        Radiology: reviewed  < from: Xray Foot AP + Lateral + Oblique, Right (02.10.21 @ 11:30) >    EXAM:  FOOT RIGHT (MINIMUM 3 VIEWS)                            PROCEDURE DATE:  02/10/2021          INTERPRETATION:  DATE OF STUDY: 2/10/21    COMPARISON: 20    CLINICAL HISTORY:  Right foot infection.    Technique: 3 views of the right foot.    FINDINGS:  No acute fracture-subluxation.  Surgical clip projects near the right tarsonavicular bone.  Redemonstration of amputation of the distal half of the second distal phalanx.  The resected bony margin is sharp.  No bony erosive or destructive lesions.  No tracking of soft tissue air.    IMPRESSION:  No discrete osteomyelitic erosion.  No tracking of soft tissue air.            JODY WRAY MD; Attending Radiologist  This document has been electronically signed. Feb 10 2021  3:00PM    < end of copied text >  < from: Xray Chest 1 View-PORTABLE IMMEDIATE (Xray Chest 1 View-PORTABLE IMMEDIATE .) (02.10.21 @ 16:21) >    EXAM:  XR CHEST PORTABLE IMMED 1V                            PROCEDURE DATE:  02/10/2021          INTERPRETATION:  CLINICAL STATEMENT: Preop    TECHNIQUE: AP view of the chest.    COMPARISON: 2020    FINDINGS/  IMPRESSION:  Left chest wall device present.    Questionable nonspecific mild opacity left lung base. Follow-up recommended.    No pleural effusion    Heart size cannot be accurately assessed in this projection. Mild elevation right hemidiaphragm.              MYNOR SALAZAR MD; Attending Radiologist  This document has been electronically signed. Feb 10 2021  4:54PM    < end of copied text >  < from: US Duplex Venous Lower Ext Ltd, Right (02.10.21 @ 11:06) >    EXAM:  US DPLX LWR EXT VEINS LTD RT                            PROCEDURE DATE:  02/10/2021          INTERPRETATION:  CLINICAL INFORMATION: Right middle toe infection    TECHNIQUE: Duplex sonography of the RIGHT LOWER extremity veins with color and spectral Doppler, with and without compression.    FINDINGS:    There is normal compressibility of the right common femoral, femoral and popliteal veins.  The contralateral common femoral vein is patent.  Doppler examination shows normal spontaneous and phasic flow.    No calf vein thrombosis is detected.    IMPRESSION:  No evidence of right lower extremity deep venous thrombosis.                SHAI MULLINS MD; Attending Radiologist  This document has been electronically signed. Feb 10 2021  1:11PM    < end of copied text >

## 2021-02-11 NOTE — CONSULT NOTE ADULT - ATTENDING COMMENTS
Infectious Diseases will continue to follow. Please call with any questions.   Claire Castro M.D.  [facility], Division of Infectious Diseases [phone]  For over the weekend and after hours, please call [phone]
I personally saw and examined the patient in detail.  I have spoken to the above provider regarding the assessment and plan of care.  I reviewed the above assessment and plan of care, and agree.  I have made changes in the body of the note where appropriate.  Optimized for the OR from a cardiac point of view with no evidence of active ischemic heart disease, decompensated heart failure, severe obstructive valvular disease, or uncontrolled arrhythmia.
Patient evaluated at the bedside. Palpable pedal pulses present. No further vascular surgery evaluation is needed at this point. Cleared for podiatric intervention.

## 2021-02-11 NOTE — DISCHARGE NOTE PROVIDER - NSDCFUSCHEDAPPT_GEN_ALL_CORE_FT
LUTHER NORIEGA ; 02/12/2021 ; [facility]  [place]  LUTHER NORIEGA ; 02/12/2021 ; [facility] LUTHER NORIEGA ; 02/12/2021 ; [facility] LUTHER NORIEGA ; 02/20/2021 ; [facility]

## 2021-02-11 NOTE — DISCHARGE NOTE PROVIDER - NSDCFUADDINST_GEN_ALL_CORE_FT
Weight bearing as tolerated of right foot with surgical shoe only until follow up with podiatry    WOUND CARE INSTRUCTIONS  1) Keep dressing clean, dry, intact until follow up.    2) Monitor foot for signs of infection including redness, swelling, drainage, foul odor.   If any are noted, please come to ED immediately.   3) Please follow up with Dr. Araiza at the \[facility]\"" Hyper[facility]ic/Wound center within 3-5 days of discharge.

## 2021-02-11 NOTE — PROGRESS NOTE ADULT - PROBLEM SELECTOR PLAN 3
hydrALAZINE 25 milliGRAM(s) Oral three times a day  losartan 25 milliGRAM(s) Oral daily  metoprolol tartrate Oral Tab/Cap - Peds 75 milliGRAM(s) Oral two times a day    BP monitoring,continue current antihypertensive meds, low salt diet,followup with PMD in 1-2 weeks.

## 2021-02-11 NOTE — PRE-OP CHECKLIST - AS BP NONINV SITE
left upper arm =================================  NEUROCRITICAL CARE ATTENDING NOTE  =================================    NAILA HERMAN   MRN-[id number]  Summary:  49M with recurrent ameloblastoma, discharge , on outpatient follow-up found to have CSF leak.  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 Acquired facial deformityHistory of tonsillectomy and adenoidectomyHistory of plastic surgeryHistory of facial surgery  Home meds: artificial tears,  lacrilube, percocet, prednisone    PHYSICAL EXAMINATION  T(C): , Max: 36.9 ( @ 21:28) HR: 74 (66 - 94) BP: 115/64 (110/73 - 145/67) RR: 15 (9 - 30) SpO2: 97% (95% - 98%)  NEUROLOGIC EXAMINATION:  Patient is awake, alert, fully oriented, pupils 2-3mm equal and briskly reactive to light, EOMs intact, muscle strength 5/5 on all 4 extremities  GENERAL:  not intubated, not in cardiorespiratory distress  EENT: anicteric  CARDIOVASC:  (+) S1 S2, normal rate and regular rhythm  PULMONARY:  clear to auscultation bilaterally  ABDOMEN:  soft, nontender, with normoactive bowel sounds  EXTREMITIES:  no edema  SKIN:  no rash    LABS:  CAPILLARY BLOOD GLUCOSE 79 (06 Mar 2017 06:00) 134 (05 Mar 2017 23:00) 104 (05 Mar 2017 17:00) 118 (05 Mar 2017 11:00)                        11.3   13.5  )-----------( 273      ( 05 Mar 2017 05:59 )             33.4     135    |  100    |  10     ----------------------------<  113    4.0     |  29     |  0.71     Ca    8.5        05 Mar 2017 05:58  Phos  3.0       05 Mar 2017 05:58  Mg     2.1       05 Mar 2017 05:58    I & Os for current day (as of  @ 07:30)  IN: 2355 ml / OUT: 1253 ml / NET: 1102 ml    Neuroimagin/05 increase in air within R temporal lobe, edema mass effect and MLS  Other imagin/02 Doppler: no DVT    MEDICATIONS: vancomycin 2g q12h  q8h  dexamethasone 4mg IV q12h oxycodone PRN pantoprazole 40 IV daily mod ISS SQH salt tabs 2g TID  MEDICATIONS: petrolatum ophthalmic, vancomycin 2g IV q12h  q8h mod ISS salt tabs 2g TID senna pantoprazole 40 daily milralax percocet dexamethasone mg IV BID levetiracetam 500 IV q12h cefepime 2g IV q8h oxycodone q4h    IV FLUIDS: NS@100cc/hr  DRIPS:  DIET: NPO  Lines / Drains: LD 10cchr    CODE STATUS:  full code                       GOALS OF CARE:  aggressive                      DISPOSITION:  ICU =================================  NEUROCRITICAL CARE ATTENDING NOTE  =================================    NAILA HERMAN   MRN-[id number]  Summary:  49M with recurrent ameloblastoma, discharge , on outpatient follow-up found to have CSF leak.  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 Acquired facial deformityHistory of tonsillectomy and adenoidectomyHistory of plastic surgeryHistory of facial surgery  Home meds: artificial tears,  lacrilube, percocet, prednisone    PHYSICAL EXAMINATION  T(C): , Max: 36.9 ( @ 21:28) HR: 74 (66 - 94) BP: 115/64 (110/73 - 145/67) RR: 15 (9 - 30) SpO2: 97% (95% - 98%)  NEUROLOGIC EXAMINATION:  Patient is awake, alert, fully oriented, pupils 2-3mm equal and briskly reactive to light, EOMs intact, muscle strength 5/5 on all 4 extremities  GENERAL:  not intubated, not in cardiorespiratory distress  EENT: anicteric  CARDIOVASC:  (+) S1 S2, normal rate and regular rhythm  PULMONARY:  clear to auscultation bilaterally  ABDOMEN:  soft, nontender, with normoactive bowel sounds  EXTREMITIES:  no edema  SKIN:  no rash    LABS:  CAPILLARY BLOOD GLUCOSE 79 (06 Mar 2017 06:00) 134 (05 Mar 2017 23:00) 104 (05 Mar 2017 17:00) 118 (05 Mar 2017 11:00)                        11.3   13.5  )-----------( 273      ( 05 Mar 2017 05:59 )             33.4     135    |  100    |  10     ----------------------------<  113    4.0     |  29     |  0.71     Ca    8.5        05 Mar 2017 05:58  Phos  3.0       05 Mar 2017 05:58  Mg     2.1       05 Mar 2017 05:58    I & Os for current day (as of  @ 07:30)  IN: 2355 ml / OUT: 1253 ml / NET: 1102 ml    Neuroimagin/05 increase in air within R temporal lobe, edema mass effect and MLS  Other imagin/02 Doppler: no DVT    MEDICATIONS: vancomycin 2g q12h  q8h  dexamethasone 4mg IV q12h oxycodone PRN pantoprazole 40 IV daily mod ISS SQH salt tabs 2g TID  MEDICATIONS: petrolatum ophthalmic, vancomycin 2g IV q12h  q8h mod ISS salt tabs 2g TID senna pantoprazole 40 daily milralax percocet dexamethasone mg IV BID levetiracetam 500 IV q12h cefepime 2g IV q8h oxycodone q4h    IV FLUIDS: NS@100cc/hr  DRIPS:  DIET: NPO  Lines / Drains: LD 10cchr    CODE STATUS:  full code                       GOALS OF CARE:  aggressive                      DISPOSITION:  ICU    RECENT CULTURES:   .CSF CSF XXXX  No organisms seen Moderate WBC's XXXX   .Blood Blood-Peripheral XXXX XXXX  No growth at 1 day.    .CSF CSF XXXX  No organisms seen Numerous white blood cells  No growth to date    CSF STUDIES     L   *** RBC48 [id number] *** %N80 %L2 =================================  NEUROCRITICAL CARE ATTENDING NOTE  =================================    NAILA HERMAN   MRN-[id number]  Summary:  49M with recurrent ameloblastoma, discharge , on outpatient follow-up found to have CSF leak.  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 Acquired facial deformityHistory of tonsillectomy and adenoidectomyHistory of plastic surgeryHistory of facial surgery  Home meds: artificial tears,  lacrilube, percocet, prednisone    PHYSICAL EXAMINATION  T(C): , Max: 36.9 ( @ 21:28) HR: 74 (66 - 94) BP: 115/64 (110/73 - 145/67) RR: 15 (9 - 30) SpO2: 97% (95% - 98%)  NEUROLOGIC EXAMINATION:  Patient is awake, alert, fully oriented, pupils 2-3mm equal and briskly reactive to light, EOMs intact, muscle strength 5/5 on all 4 extremities  GENERAL:  not intubated, not in cardiorespiratory distress  EENT: anicteric  CARDIOVASC:  (+) S1 S2, normal rate and regular rhythm  PULMONARY:  clear to auscultation bilaterally  ABDOMEN:  soft, nontender, with normoactive bowel sounds  EXTREMITIES:  no edema  SKIN:  no rash    LABS:  CAPILLARY BLOOD GLUCOSE 79 (06 Mar 2017 07:52) 79 (06 Mar 2017 06:00) 134 (05 Mar 2017 23:00) 104 (05 Mar 2017 17:00) 118 (05 Mar 2017 11:00)                        11.3   13.5  )-----------( 273      ( 05 Mar 2017 05:59 )             33.4     133    |  99     |  12     ----------------------------<  95     4.9     |  28     |  0.78     Ca    9.0        06 Mar 2017 07:46  Phos  3.3       06 Mar 2017 07:46  Mg     2.2       06 Mar 2017 07:46    I & Os for current day (as of  @ 10:56)  IN: 2355 ml / OUT: 1453 ml / NET: 902 ml    Neuroimagin/05 increase in air within R temporal lobe, edema mass effect and MLS  Other imagin/02 Doppler: no DVT    MEDICATIONS: petrolatum ophthalmic, vancomycin 2g IV q12h  q8h mod ISS salt tabs 2g TID senna pantoprazole 40 daily miralax percocet dexamethasone mg IV BID levetiracetam 500 IV q12h cefepime 2g IV q8h oxycodone q4h    IV FLUIDS: 2%  DRIPS:  DIET: NPO  Lines / Drains: LD clamped saturday    CODE STATUS:  full code                       GOALS OF CARE:  aggressive                      DISPOSITION:  ICU    RECENT CULTURES:   .CSF CSF XXXX  No organisms seen Moderate WBC's XXXX   .Blood Blood-Peripheral XXXX XXXX  No growth at 1 day.    .CSF CSF XXXX  No organisms seen Numerous white blood cells  No growth to date    CSF STUDIES   -  L   *** RBC48 [id number] *** %N80 %L2 =================================  NEUROCRITICAL CARE ATTENDING NOTE  =================================    NAILA HERMAN   MRN-[id number]  Summary:  49M with recurrent ameloblastoma, discharge , on outpatient follow-up found to have CSF leak.  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 Acquired facial deformityHistory of tonsillectomy and adenoidectomyHistory of plastic surgeryHistory of facial surgery  Home meds: artificial tears,  lacrilube, percocet, prednisone    PHYSICAL EXAMINATION  T(C): , Max: 36.9 ( @ 21:28) HR: 74 (66 - 94) BP: 115/64 (110/73 - 145/67) RR: 15 (9 - 30) SpO2: 97% (95% - 98%)  NEUROLOGIC EXAMINATION:  <post-op> Patient awake, easily rousable, able to sustain wakefulness, R eye sutured, L eye pupil 2mm reactive to light, good muscle strength on all 4 extremities  GENERAL:  not intubated, not in cardiorespiratory distress  EENT: anicteric  CARDIOVASC:  (+) S1 S2, normal rate and regular rhythm  PULMONARY:  clear to auscultation bilaterally  ABDOMEN:  soft, nontender, with normoactive bowel sounds  EXTREMITIES:  no edema  SKIN:  no rash    LABS:  CAPILLARY BLOOD GLUCOSE 79 (06 Mar 2017 07:52) 79 (06 Mar 2017 06:00) 134 (05 Mar 2017 23:00) 104 (05 Mar 2017 17:00) 118 (05 Mar 2017 11:00)                        11.3   13.5  )-----------( 273      ( 05 Mar 2017 05:59 )             33.4     133    |  99     |  12     ----------------------------<  95     4.9     |  28     |  0.78     Ca    9.0        06 Mar 2017 07:46  Phos  3.3       06 Mar 2017 07:46  Mg     2.2       06 Mar 2017 07:46    I & Os for current day (as of  @ 10:56)  IN: 2355 ml / OUT: 1453 ml / NET: 902 ml    Neuroimagin/05 increase in air within R temporal lobe, edema mass effect and MLS  Other imagin/02 Doppler: no DVT    MEDICATIONS: petrolatum ophthalmic, vancomycin 2g IV q12h  q8h mod ISS salt tabs 2g TID senna pantoprazole 40 daily miralax percocet dexamethasone mg IV BID levetiracetam 500 IV q12h cefepime 2g IV q8h oxycodone q4h    IV FLUIDS: 2%  DRIPS:  DIET: NPO  Lines / Drains: LD clamped saturday, Ivana Bowser    CODE STATUS:  full code                       GOALS OF CARE:  aggressive                      DISPOSITION:  ICU    RECENT CULTURES:   .CSF CSF XXXX  No organisms seen Moderate WBC's XXXX   .Blood Blood-Peripheral XXXX XXXX  No growth at 1 day.    .CSF CSF XXXX  No organisms seen Numerous white blood cells  No growth to date    CSF STUDIES   -  L   *** RBC48 [id number] *** %N80 %L2

## 2021-02-11 NOTE — CONSULT NOTE ADULT - CONSULT REQUESTED DATE/TIME
10-Feb-2021 18:41
10-Feb-2021 14:13
10-Feb-2021 16:25
10-Feb-2021 17:15
10-Feb-2021
10-Feb-2021 18:23
11-Feb-2021 12:06

## 2021-02-11 NOTE — DISCHARGE NOTE PROVIDER - NSDCMRMEDTOKEN_GEN_ALL_CORE_FT
aspirin 325 mg oral tablet: 1 tab(s) orally once a day  azaTHIOprine 100 mg oral tablet: 1 tab(s) orally once a day  cloNIDine 0.1 mg oral tablet: 1 tab(s) orally 2 times a day  famotidine 20 mg oral tablet: 1 tab(s) orally once a day  gabapentin 300 mg oral capsule: 1 cap(s) orally 2 times a day  HumaLOG 100 units/mL injectable solution: 3 unit(s) injectable every hour via insulin pump. TDD: 72 units.  hydrALAZINE 25 mg oral tablet: 1 tab(s) orally 3 times a day  losartan 25 mg oral tablet: 1 tab(s) orally once a day  lovastatin 40 mg oral tablet: 1 tab(s) orally once a day  Metoprolol Tartrate 25 mg oral tablet: 3 tab(s) orally 2 times a day    *conf. with patient and pharmacy. 75mg dose, BID*  sulfamethoxazole-trimethoprim 400 mg-80 mg oral tablet: 1 tab(s) orally once a day  tacrolimus: 1.5 milligram(s) orally 2 times a day   aspirin 325 mg oral tablet: 1 tab(s) orally once a day  azaTHIOprine 100 mg oral tablet: 1 tab(s) orally once a day  cloNIDine 0.1 mg oral tablet: 1 tab(s) orally 2 times a day  famotidine 20 mg oral tablet: 1 tab(s) orally once a day  gabapentin 300 mg oral capsule: 1 cap(s) orally 2 times a day  HumaLOG 100 units/mL injectable solution: 3 unit(s) injectable every hour via insulin pump. TDD: 72 units.  hydrALAZINE 25 mg oral tablet: 1 tab(s) orally 3 times a day  Keflex 500 mg oral capsule: 1 cap(s) orally 4 times a day   losartan 25 mg oral tablet: 1 tab(s) orally once a day  lovastatin 40 mg oral tablet: 1 tab(s) orally once a day  Metoprolol Tartrate 25 mg oral tablet: 3 tab(s) orally 2 times a day    *conf. with patient and pharmacy. 75mg dose, BID*  sulfamethoxazole-trimethoprim 400 mg-80 mg oral tablet: 1 tab(s) orally once a day  tacrolimus: 1.5 milligram(s) orally 2 times a day

## 2021-02-11 NOTE — CONSULT NOTE ADULT - REASON FOR ADMISSION
osteomyelitis
osteomyelitis
Right 3rd toe ulcer, with proximal extension of cellulitis
osteomyelitis

## 2021-02-11 NOTE — PROGRESS NOTE ADULT - SUBJECTIVE AND OBJECTIVE BOX
CAPILLARY BLOOD GLUCOSE      POCT Blood Glucose.: 281 mg/dL (11 Feb 2021 11:04)  POCT Blood Glucose.: 334 mg/dL (11 Feb 2021 09:08)  POCT Blood Glucose.: 334 mg/dL (11 Feb 2021 07:34)  POCT Blood Glucose.: 334 mg/dL (10 Feb 2021 21:35)  POCT Blood Glucose.: 306 mg/dL (10 Feb 2021 17:12)      Vital Signs Last 24 Hrs  T(C): 36.9 (11 Feb 2021 09:39), Max: 37.1 (10 Feb 2021 21:19)  T(F): 98.4 (11 Feb 2021 09:39), Max: 98.8 (10 Feb 2021 21:19)  HR: 60 (11 Feb 2021 10:48) (58 - 76)  BP: 133/60 (11 Feb 2021 10:48) (109/64 - 177/91)  BP(mean): --  RR: 20 (11 Feb 2021 10:48) (13 - 20)  SpO2: 98% (11 Feb 2021 10:48) (92% - 98%)    General: WN/WD NAD  Respiratory: CTA B/L  CV: RRR, S1S2, no murmurs, rubs or gallops  Abdominal: Soft, NT, ND +BS, Last BM  Extremities: le foot dsg intact     02-11    139  |  104  |  19  ----------------------------<  306<H>  4.4   |  30  |  1.10    Ca    8.7      11 Feb 2021 07:11    TPro  7.0  /  Alb  3.3  /  TBili  0.6  /  DBili  x   /  AST  15  /  ALT  21  /  AlkPhos  120  02-11      atorvastatin 10 milliGRAM(s) Oral at bedtime  dextrose 40% Gel 15 Gram(s) Oral once  dextrose 50% Injectable 25 Gram(s) IV Push once  dextrose 50% Injectable 25 Gram(s) IV Push once  dextrose 50% Injectable 12.5 Gram(s) IV Push once  glucagon  Injectable 1 milliGRAM(s) IntraMuscular once  insulin glargine Injectable (LANTUS) 25 Unit(s) SubCutaneous at bedtime  insulin lispro (ADMELOG) corrective regimen sliding scale   SubCutaneous three times a day before meals  insulin lispro (ADMELOG) corrective regimen sliding scale   SubCutaneous at bedtime  insulin lispro Injectable (ADMELOG) 8 Unit(s) SubCutaneous three times a day before meals

## 2021-02-11 NOTE — PROGRESS NOTE ADULT - SUBJECTIVE AND OBJECTIVE BOX
[facility] -- Julisa Jansen, Luly Larson, Karan Weldon, Jay Becker: Office # [phone]    Follow Up:   HTN, Cardiac optimization pre/post op     Subjective/Observations: Patient seen and examined. Patient awake, alert, resting comfortably. No complaints of chest pain, dyspnea, palpitations or dizziness. No signs of orthopnea or PND.  S/P Right 3rd toe amputation. Tolerating room air.       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, L arm    History of DVT (deep vein thrombosis)    Squamous cell carcinoma of skin of left ear    CVA (cerebral vascular accident)    Obesity (BMI 30-39.9)    Diabetic peripheral neuropathy    CKD (chronic kidney disease)  Renal Transplant 2013 at [facility]    Diabetes mellitus    Hyperlipidemia    Hypertension    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    S/P kidney transplant    End-stage renal failure with renal transplant  12/2013    H/O foot surgery  2005 - right foot - bone fracture - s/p ORIF and subsequent removal of hardware    Vasectomy status  s/p b/l  vasectomy    Ear disease  Left inner ear surgery, 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 1000 milliLiter(s) (50 mL/Hr) IV Continuous <Continuous>  dextrose 5%. 1000 milliLiter(s) (100 mL/Hr) IV Continuous <Continuous>  dextrose 50% Injectable 25 Gram(s) IV Push once  dextrose 50% Injectable 25 Gram(s) IV Push once  dextrose 50% Injectable 12.5 Gram(s) IV Push once  famotidine    Tablet 20 milliGRAM(s) Oral daily  gabapentin 300 milliGRAM(s) Oral two times a day  glucagon  Injectable 1 milliGRAM(s) IntraMuscular once  hydrALAZINE 25 milliGRAM(s) Oral three times a day  insulin glargine Injectable (LANTUS) 35 Unit(s) SubCutaneous at bedtime  insulin lispro (ADMELOG) corrective regimen sliding scale   SubCutaneous three times a day before meals  insulin lispro (ADMELOG) corrective regimen sliding scale   SubCutaneous at bedtime  insulin lispro Injectable (ADMELOG) 10 Unit(s) SubCutaneous three times a day before meals  losartan 25 milliGRAM(s) Oral daily  metoprolol tartrate 75 milliGRAM(s) Oral two times a day  tacrolimus 1.5 milliGRAM(s) Oral two times a day  trimethoprim   80 mG/sulfamethoxazole 400 mG 1 Tablet(s) Oral daily    MEDICATIONS  (PRN):    Allergies    No Known Allergies    Intolerances      Vital Signs Last 24 Hrs  T(C): 36.9 (11 Feb 2021 09:39), Max: 37.1 (10 Feb 2021 21:19)  T(F): 98.4 (11 Feb 2021 09:39), Max: 98.8 (10 Feb 2021 21:19)  HR: 60 (11 Feb 2021 10:48) (58 - 75)  BP: 133/60 (11 Feb 2021 10:48) (109/64 - 177/91)  BP(mean): --  RR: 20 (11 Feb 2021 10:48) (13 - 20)  SpO2: 98% (11 Feb 2021 10:48) (92% - 98%)  I&O's Summary    Weight (kg): 142.9 (02-11 @ 08:26)    TELE: Not on telemetry   PHYSICAL EXAM:  Appearance: NAD, no distress, alert, Well developed   HEENT: Moist Mucous Membranes, Anicteric  Cardiovascular: Regular rate and rhythm, Normal S1 S2, No JVD, No murmurs, No rubs, gallops or clicks  Respiratory: Non-labored, Clear to auscultation, No rales, No rhonchi, No wheezing.   Gastrointestinal:  Soft, Non-tender, + BS  Neurologic: Non-focal  Skin: Warm and dry, No visible rashes or ulcers, No ecchymosis, No cyanosis  Musculoskeletal: No clubbing, No cyanosis, No joint swelling/tenderness  Psychiatry: Mood & affect appropriate  Lymph: No peripheral edema. Right LE dressing, redness along shin    LABS: All Labs Reviewed:                        13.9   7.00  )-----------( 198      ( 11 Feb 2021 07:11 )             41.6                         14.6   6.34  )-----------( 204      ( 10 Feb 2021 10:11 )             43.8     11 Feb 2021 07:11    139    |  104    |  19     ----------------------------<  306    4.4     |  30     |  1.10   10 Feb 2021 10:11    137    |  102    |  27     ----------------------------<  361    4.6     |  29     |  1.30     Ca    8.7        11 Feb 2021 07:11  Ca    9.2        10 Feb 2021 10:11    TPro  7.0    /  Alb  3.3    /  TBili  0.6    /  DBili  x      /  AST  15     /  ALT  21     /  AlkPhos  120    11 Feb 2021 07:11  TPro  8.1    /  Alb  3.9    /  TBili  0.5    /  DBili  x      /  AST  15     /  ALT  24     /  AlkPhos  147    10 Feb 2021 10:11    PT/INR - ( 10 Feb 2021 10:11 )   PT: 11.2 sec;   INR: 0.96 ratio         PTT - ( 10 Feb 2021 10:11 )  PTT:30.5 sec              Lactate, Blood: 0.9 mmol/L (02-10-21 @ 10:09)    12 Lead ECG:   Ventricular Rate 59 BPM    Atrial Rate 59 BPM    P-R Interval 240 ms    QRS Duration 94 ms    Q-T Interval 398 ms    QTC Calculation(Bazett) 394 ms    P Axis 43 degrees    R Axis -1 degrees    T Axis 22 degrees    Diagnosis Line Sinus bradycardia with 1st degree AV block  Confirmed by ANDREW WELDON ([id number]) on 2/10/2021 12:54:04 PM (02-10-21 @ 10:00)  < from: TTE with Doppler (w/Cont) (07.27.17 @ 09:29) >  Conclusions:  1. Increased relative wall thickness with normal left ventricular mass index, consistent with concentric left ventricular remodeling.  2. Normal left ventricular systolic function. No segmental wall motion abnormalities. Endocardial visualization enhanced with intravenous injection of echo contrast (Definity).  < end of copied text >    < from: Cardiac Cath Lab (11.20.13 @ 09:46) >  VENTRICLES: No left ventriculogram was performed.  CORONARY VESSELS: The coronary circulation is co-dominant.  LM:   --  LM: Normal.  LAD:   --  Mid LAD: Angiography showed minor luminal irregularities with no  flow limiting lesions.  CX:   --  Circumflex: Normal.  RCA:   --  RCA: Normal.  COMPLICATIONS: There were no complications.  DIAGNOSTIC RECOMMENDATIONS: Patient management should include close monitoring of BUN and creatinine. Medical management is recommended. At this time, patient is stable from a cardiovascular standpoint for renal transplant.  < end of copied text >    < from: Xray Chest 1 View AP/PA (08.26.20 @ 10:03) >  FINDINGS:  Lungs: The lungs are clear.Mild elevation of the right hemidiaphragm.  Heart: The heart is normal in size. Loop recorder overlies the heart.  Mediastinum: The mediastinum is within normal limits.  IMPRESSION:  Mild elevation of the right hemidiaphragm. No focal consolidation.  < end of copied text >

## 2021-02-11 NOTE — DISCHARGE NOTE PROVIDER - CARE PROVIDER_API CALL
Kobe Miranda)  [address], [address]  [place] [address]  Phone: [phone]  Fax: [phone]  Established Patient  Follow Up Time: 1 week    Jonatan Navarro)  Surgery  [facility], [address] [place] [address]  Phone: [phone]  Fax: [phone]  Established Patient  Follow Up Time: Routine    Compa Claire  NEUROLOGY  [address], [address]  [place] [address]  Phone: [phone]  Fax: [phone]  Established Patient  Follow Up Time: Routine    Jasbir Garcia  ENDOCRINOLOGY/METAB/DIABETES  [address], [address]  [place] [address]  Phone: [phone]  Fax: [phone]  Follow Up Time: Routine   Kobe Miranda)  Medicine  [address], [address]  [place] [address]  Phone: [phone]  Fax: [phone]  Established Patient  Follow Up Time: 1 week    Jonatan Navarro)  Surgery  Transplant Center, [address] [place] [address]  Phone: [phone]  Fax: [phone]  Established Patient  Follow Up Time: Routine    Compa Claire  NEUROLOGY  [address] [facility] [facility], [address]  [place] [address]  Phone: [phone]  Fax: [phone]  Established Patient  Follow Up Time: Routine    Jasbir Garcia  ENDOCRINOLOGY/METAB/DIABETES  [address], [address]  [place] [address]  Phone: [phone]  Fax: [phone]  Follow Up Time: Routine    Kendall Araiza (DPM)  Podiatric Medicine and Surgery  [address] [place] [address]  Phone: [phone]  Fax: [phone]  Follow Up Time:    Kobe Miranda)  Medicine  [address] [facility] [facility], [address]  [place] [address]  Phone: [phone]  Fax: [phone]  Established Patient  Follow Up Time: 1 week    Jonatan Navarro)  Surgery  Transplant Center, [address] [place] [address]  Phone: [phone]  Fax: [phone]  Established Patient  Follow Up Time: Routine    Compa Claire  NEUROLOGY  [address] [facility], [address]  [place] [address]  Phone: [phone]  Fax: [phone]  Established Patient  Follow Up Time: Routine    Jasbir Garcia  ENDOCRINOLOGY/METAB/DIABETES  [address] [facility], [address]  [place] [address]  Phone: [phone]  Fax: [phone]  Follow Up Time: Routine    Kendall Araiza (DPM)  Podiatric Medicine and Surgery  [address] [place] [address]  Phone: [phone]  Fax: [phone]  Follow Up Time:     Rey Valenzuela  INTERNAL MEDICINE  [address] [place] [address]  Phone: [phone]  Fax: [phone]  Follow Up Time:

## 2021-02-11 NOTE — PROGRESS NOTE ADULT - ASSESSMENT
54 YO M with hx CKD s/p renal transplant, CVA, DM, neuropathy, DVT, HLD, Hypertension ,Obesity, squamous cell carcinoma, presents to ED for redness/swelling R leg. Patient states 3 days ago, he first noticed his toe nail on the R 3rd digit starting to "lift up" from a "small pimple" underneath. States there was also clear/red oozing. Patient made an appointment with Dr. Araiza for Friday of this week. Yesterday patient developed redness and swelling in the R lower leg and had blood sugars in the 300s which prompted him to come to the ED. Patient has peripheral neuropathy and states he has no pain in his legs. Denies any fever, chills, SOB, CP, N/V.     patient was admitted to [facility] last August for osteomyelitis and had left 3rd toe distal digit partial amputation and right foot distal digit 2nd digit on 8/27/20.     MEDICATIONS  (STANDING):  atorvastatin 10 milliGRAM(s) Oral at bedtime  azaTHIOprine 100 milliGRAM(s) Oral daily  cefTRIAXone   IVPB 2000 milliGRAM(s) IV Intermittent every 24 hours  cloNIDine 0.1 milliGRAM(s) Oral two times a day  dextrose 40% Gel 15 Gram(s) Oral once  dextrose 5%. 1000 milliLiter(s) (50 mL/Hr) IV Continuous <Continuous>  dextrose 5%. 1000 milliLiter(s) (100 mL/Hr) IV Continuous <Continuous>  dextrose 50% Injectable 25 Gram(s) IV Push once  dextrose 50% Injectable 12.5 Gram(s) IV Push once  dextrose 50% Injectable 25 Gram(s) IV Push once  famotidine    Tablet 20 milliGRAM(s) Oral daily  gabapentin 300 milliGRAM(s) Oral two times a day  glucagon  Injectable 1 milliGRAM(s) IntraMuscular once  heparin   Injectable 5000 Unit(s) IV Push once  hydrALAZINE 25 milliGRAM(s) Oral three times a day  insulin glargine Injectable (LANTUS) 25 Unit(s) SubCutaneous at bedtime  insulin lispro (ADMELOG) corrective regimen sliding scale   SubCutaneous three times a day before meals  insulin lispro (ADMELOG) corrective regimen sliding scale   SubCutaneous at bedtime  insulin lispro Injectable (ADMELOG) 8 Unit(s) SubCutaneous three times a day before meals  losartan 25 milliGRAM(s) Oral daily  metoprolol tartrate Oral Tab/Cap - Peds 75 milliGRAM(s) Oral two times a day  tacrolimus 1.5 milliGRAM(s) Oral two times a day  trimethoprim   80 mG/sulfamethoxazole 400 mG 1 Tablet(s) Oral daily

## 2021-02-11 NOTE — ADVANCED PRACTICE NURSE CONSULT - RECOMMEDATIONS
MOnitor Glucose trends  Goal range 100 to 180mg/dl  resume pump tonigh Dr Perlman to write orders  follow  pump to security

## 2021-02-11 NOTE — PROGRESS NOTE ADULT - PROBLEM SELECTOR PLAN 4
Hypertension.    BP initially 194/97 in ER, did not take his home meds   -BP stable after receiving home meds   -continue home clonidine .1 mg BID , and metoprolol 75 mg BID and losartan 25 mg qD Hypertension.    -continue home clonidine .1 mg BID , and metoprolol 75 mg BID and losartan 25 mg qD

## 2021-02-11 NOTE — PROGRESS NOTE ADULT - SUBJECTIVE AND OBJECTIVE BOX
Patient is a 55y Male whom presented to the hospital with s/p End-stage renal failure with renal transplant  2013      PAST MEDICAL & SURGICAL HISTORY:  Recurrent squamous cell carcinoma of skin  nose, L arm    History of DVT (deep vein thrombosis)    Squamous cell carcinoma of skin of left ear    CVA (cerebral vascular accident)    Obesity (BMI 30-39.9)    Diabetic peripheral neuropathy    CKD (chronic kidney disease)    Diabetes mellitus    Hyperlipidemia    Hypertension    S/P kidney transplant    End-stage renal failure with renal transplant  2013    H/O foot surgery  2005 - right foot - bone fracture - s/p ORIF and subsequent removal of hardware    Vasectomy status  s/p b/l  vasectomy    Ear disease  Left inner ear surgery    Toe infection   L 4th Toe surgery-amputation secondary to osteomyelitis        MEDICATIONS  (STANDING):  atorvastatin 10 milliGRAM(s) Oral at bedtime  azaTHIOprine 100 milliGRAM(s) Oral daily  cefTRIAXone   IVPB 2000 milliGRAM(s) IV Intermittent every 24 hours  cloNIDine 0.1 milliGRAM(s) Oral two times a day  dextrose 40% Gel 15 Gram(s) Oral once  dextrose 5%. 1000 milliLiter(s) (50 mL/Hr) IV Continuous <Continuous>  dextrose 5%. 1000 milliLiter(s) (100 mL/Hr) IV Continuous <Continuous>  dextrose 50% Injectable 25 Gram(s) IV Push once  dextrose 50% Injectable 12.5 Gram(s) IV Push once  dextrose 50% Injectable 25 Gram(s) IV Push once  famotidine    Tablet 20 milliGRAM(s) Oral daily  gabapentin 300 milliGRAM(s) Oral two times a day  glucagon  Injectable 1 milliGRAM(s) IntraMuscular once  heparin   Injectable 5000 Unit(s) IV Push once  hydrALAZINE 25 milliGRAM(s) Oral three times a day  insulin glargine Injectable (LANTUS) 25 Unit(s) SubCutaneous at bedtime  insulin lispro (ADMELOG) corrective regimen sliding scale   SubCutaneous three times a day before meals  insulin lispro (ADMELOG) corrective regimen sliding scale   SubCutaneous at bedtime  insulin lispro Injectable (ADMELOG) 8 Unit(s) SubCutaneous three times a day before meals  losartan 25 milliGRAM(s) Oral daily  metoprolol tartrate Oral Tab/Cap - Peds 75 milliGRAM(s) Oral two times a day  tacrolimus 1.5 milliGRAM(s) Oral two times a day  trimethoprim   80 mG/sulfamethoxazole 400 mG 1 Tablet(s) Oral daily      Allergies    No Known Allergies    Intolerances        SOCIAL HISTORY:  Denies ETOh,Smoking,     FAMILY HISTORY:  FH: skin cancer    Family history of diabetes mellitus (DM)        REVIEW OF SYSTEMS:  CONSTITUTIONAL: No weakness, fevers or chills  NECK: No pain or stiffness  RESPIRATORY: No cough, wheezing, hemoptysis; No shortness of breath  CARDIOVASCULAR: No chest pain or palpitations  GASTROINTESTINAL: No abdominal or epigastric pain. No nausea, vomiting,     No diarrhea or constipation. No melena   GENITOURINARY: No dysuria, frequency or hematuria                            13.9   7.00  )-----------( 198      ( 2021 07:11 )             41.6       CBC Full  -  ( 2021 07:11 )  WBC Count : 7.00 K/uL  RBC Count : 4.84 M/uL  Hemoglobin : 13.9 g/dL  Hematocrit : 41.6 %  Platelet Count - Automated : 198 K/uL  Mean Cell Volume : 86.0 fl  Mean Cell Hemoglobin : 28.7 pg  Mean Cell Hemoglobin Concentration : 33.4 gm/dL  Auto Neutrophil # : 4.77 K/uL  Auto Lymphocyte # : 1.26 K/uL  Auto Monocyte # : 0.56 K/uL  Auto Eosinophil # : 0.34 K/uL  Auto Basophil # : 0.06 K/uL  Auto Neutrophil % : 68.1 %  Auto Lymphocyte % : 18.0 %  Auto Monocyte % : 8.0 %  Auto Eosinophil % : 4.9 %  Auto Basophil % : 0.9 %      11    139  |  104  |  19  ----------------------------<  306<H>  4.4   |  30  |  1.10    Ca    8.7      2021 07:11    TPro  7.0  /  Alb  3.3  /  TBili  0.6  /  DBili  x   /  AST  15  /  ALT  21  /  AlkPhos  120  02-11      CAPILLARY BLOOD GLUCOSE      POCT Blood Glucose.: 281 mg/dL (2021 11:04)  POCT Blood Glucose.: 334 mg/dL (2021 09:08)  POCT Blood Glucose.: 334 mg/dL (2021 07:34)  POCT Blood Glucose.: 334 mg/dL (10 Feb 2021 21:35)  POCT Blood Glucose.: 306 mg/dL (10 Feb 2021 17:12)      Vital Signs Last 24 Hrs  T(C): 36.9 (2021 09:39), Max: 37.1 (10 Feb 2021 21:19)  T(F): 98.4 (2021 09:39), Max: 98.8 (10 Feb 2021 21:19)  HR: 60 (2021 10:48) (58 - 75)  BP: 133/60 (2021 10:48) (109/64 - 177/91)  BP(mean): --  RR: 20 (2021 10:48) (13 - 20)  SpO2: 98% (2021 10:48) (92% - 98%)    Urinalysis Basic - ( 10 Feb 2021 12:02 )    Color: Pale Yellow / Appearance: Slightly Turbid / S.010 / pH: x  Gluc: x / Ketone: Negative  / Bili: Negative / Urobili: Negative   Blood: x / Protein: 30 mg/dL / Nitrite: Negative   Leuk Esterase: Negative / RBC: 6-10 /HPF / WBC x   Sq Epi: x / Non Sq Epi: Occasional / Bacteria: x        PT/INR - ( 10 Feb 2021 10:11 )   PT: 11.2 sec;   INR: 0.96 ratio         PTT - ( 10 Feb 2021 10:11 )  PTT:30.5 sec    PHYSICAL EXAM:    Constitutional: NAD  HEENT: conjunctive   clear   Neck:  No JVD  Respiratory: CTAB  Cardiovascular: S1 and S2  Gastrointestinal: BS+, soft,   Neurological: A/O x 3, no focal deficits  Skin:  R foot 3rd toe infection, r shin redness

## 2021-02-11 NOTE — PROGRESS NOTE ADULT - PROBLEM SELECTOR PLAN 3
DM type 1, hyperglycemic on admission, bg in 300s  -patient is on insulin pump 3 units/hr basal rate x 24 hrs, uses humalog: will hold insulin pump for now as patient's site got disconnected when changing his clothes today (on prior admission had kept pump at 70% basal rate ie 2.1 units/hr x 24 hrs)  -family to bring tube for pump, HOLD insulin pump until tomorrow evening (24 after bedtime lantus)   -low dose ISS  -increase to lantus 35 units at bedtime  -increase to 10 units premeal   -can switch from MDII to insulin pump this evening   -A1c 10  -hypogycemia protocol   -Endocrine Dr. Craig Perlman consulted; recs appreciated DM type 1, hyperglycemic on admission, bg in 300s  -patient is on insulin pump 3 units/hr basal rate x 24 hrs, uses humalog: will hold insulin pump for now as patient's site got disconnected when changing his clothes today (on prior admission had kept pump at 70% basal rate ie 2.1 units/hr x 24 hrs)  -low dose ISS  -increase to lantus 35 units at bedtime  -increase to 10 units premeal   -patient prefers MDII, hold insulin pump    -A1c 10  -hypogycemia protocol   -Endocrine Dr. Craig Perlman consulted; recs appreciated

## 2021-02-11 NOTE — PROGRESS NOTE ADULT - PROBLEM SELECTOR PLAN 4
ID evaluation, f/u  blood and urine cx,serial lactate levels,monitor vitals closley,ivfs hydration,monitor urine output and renal profile,iv abx as per id cons  cefTRIAXone   IVPB 2000 milliGRAM(s) IV Intermittent every 24 hours.

## 2021-02-11 NOTE — PROGRESS NOTE ADULT - PROBLEM SELECTOR PLAN 1
- Rt foot  3rd distal phalanx  open wound  probe to bone  c/w with OM   - afebrile, no leukocytosis   -s/p vancomycin, Rocephin, IV fluid bolus in ED  -continue Rocephin 2g IV daily, dc Vanco as per ID    -XR R Foot: cortical erosions of right 3rd distal phalanx, however, no discrete om   -s/p right 3rd distal digit amputation with Dr. Araiza today  -f/u BCx, TCx, path    -partial WBAT with surgical boot, only short distances - Rt foot  3rd distal phalanx  open wound  probe to bone  c/w with OM , Abnormal X Ray foot with bone changes,  - afebrile, no leukocytosis   -s/p vancomycin, Rocephin, IV fluid bolus in ED  -continue Rocephin 2g IV daily, dc Vanco as per ID    -XR R Foot: cortical erosions of right 3rd distal phalanx, however, no discrete om   -s/p right 3rd distal digit amputation with Dr. Araiza today-OR   -f/u BCx, TCx, path    -partial WBAT on heel with surgical boot, only short distances

## 2021-02-11 NOTE — CONSULT NOTE ADULT - ASSESSMENT
56 YO M with hx CKD s/p renal transplant, CVA, DM, neuropathy, DVT, HLD, Hypertension ,Obesity, squamous cell carcinoma, presents to ED for redness/swelling R leg. Patient states 3 days ago, he first noticed his toe nail on the R 3rd digit starting to "lift up" from a "small pimple" underneath. States there was also clear/red oozing. Patient made an appointment with Dr. Araiza for Friday of this week. Yesterday patient developed redness and swelling in the R lower leg and had blood sugars in the 300s which prompted him to come to the ED. Patient has peripheral neuropathy and states he has no pain in his legs. Denies any fever, chills, SOB, CP, N/V.     patient was admitted to [facility] last August for osteomyelitis and had left 3rd toe distal digit partial amputation and right foot distal digit 2nd digit on 8/27/20.     MEDICATIONS  (STANDING):  atorvastatin 10 milliGRAM(s) Oral at bedtime  azaTHIOprine 100 milliGRAM(s) Oral daily  cefTRIAXone   IVPB 2000 milliGRAM(s) IV Intermittent every 24 hours  cloNIDine 0.1 milliGRAM(s) Oral two times a day  dextrose 40% Gel 15 Gram(s) Oral once  dextrose 5%. 1000 milliLiter(s) (50 mL/Hr) IV Continuous <Continuous>  dextrose 5%. 1000 milliLiter(s) (100 mL/Hr) IV Continuous <Continuous>  dextrose 50% Injectable 25 Gram(s) IV Push once  dextrose 50% Injectable 12.5 Gram(s) IV Push once  dextrose 50% Injectable 25 Gram(s) IV Push once  famotidine    Tablet 20 milliGRAM(s) Oral daily  gabapentin 300 milliGRAM(s) Oral two times a day  glucagon  Injectable 1 milliGRAM(s) IntraMuscular once  heparin   Injectable 5000 Unit(s) IV Push once  hydrALAZINE 25 milliGRAM(s) Oral three times a day  insulin glargine Injectable (LANTUS) 25 Unit(s) SubCutaneous at bedtime  insulin lispro (ADMELOG) corrective regimen sliding scale   SubCutaneous three times a day before meals  insulin lispro (ADMELOG) corrective regimen sliding scale   SubCutaneous at bedtime  insulin lispro Injectable (ADMELOG) 8 Unit(s) SubCutaneous three times a day before meals  losartan 25 milliGRAM(s) Oral daily  metoprolol tartrate Oral Tab/Cap - Peds 75 milliGRAM(s) Oral two times a day  tacrolimus 1.5 milliGRAM(s) Oral two times a day  trimethoprim   80 mG/sulfamethoxazole 400 mG 1 Tablet(s) Oral daily  
[facility] Infectious Diseases  Chart Reviewed-concerns with weight and Vanco so after discussion with pharmacy will dc Vanco and give single dose Linezolid pending further micro data no prior MRSA on micro    Full Consult to follow for any immediate concerns please fell free to contact us directly at  [phone] and have us paged or text my cell # [phone]  Marko Saucedo MD PhD  
Pt is a 55M w/ PMHx of ESR s/p RT, DM, HLD, HTN p/w redness/swelling R leg, seen by podiatry now s/p 3rd ray amputation of R foot    R foot OM/RLEcellulitis  -pending BCx  -pending OR TCx  -pending path  -imaging reviewed, no OM, no DVT  Previous cx reviewed, CONS  -send MRSA swab  -c/w ceftriaxone 2gm q24h  Will change therapy and determine duration based on above cx and path report    Renal Txp  -c/w bactrim for PJP ppx
56 yo M with right foot 3rd digit wound, right leg cellulitis
56 YO M with hx CKD s/p renal transplant, CVA (residual decreased sensation L side), DM, neuropathy, DVT, HLD, Hypertension ,Obesity, squamous cell carcinoma, presents to ED for redness/swelling R leg, patient admitted for r/o osteomyelitis of 3rd toe R foot, scheduled for OR tomorrow (2/11) for R digit amputation with Dr. Araiza. Cardiology consulted for clearance.    PreOp Clearance:  - Patient is not complaining of any cardiac symptoms at this time.  - EKG showing sinus bradycardia with 1st degree AV block, rate 59, no signs of ischemia  - No acute changes from previous EKG, Aug 2020  - Tolerated similar procedure in Aug 2020, without anesthesia complications   - Patient able to perform >4 METS without issue  - Pt has no active ischemia, decompensated heart failure, unstable arrythmia, or severe stenotic valvular disease. In the setting of low risk procedure, he is optimized from cardiovascular standpoint to proceed with planned procedure with routine hemodynamic monitoring.     HTN:  - Slightly hypertensive in ED, likely related to anxiety regarding procedure  - Continue Clonidine 0.1mg BID, Hydralazine 75mg TID, Losartan 25mg qd, Metoprolol 75mg BID    Hx CVA  - Continue ASA, Statin      - Monitor and replete lytes, keep K>4, Mg>2.  - Other cardiovascular workup will depend on clinical course.  - All other workup per primary team.  - Will continue to follow.

## 2021-02-11 NOTE — PROGRESS NOTE ADULT - ASSESSMENT
54y old  Male who presents with a chief complaint of r/o osteomyelitis, scheduled for L third digit amputation and partial right 2nd digit amputation. S/P partial amputation of third right toe  Non-healing toe ulcers    - With concern for OM.  ID following  - s/p toe amputation left 3rd digit and right partial 2nd digit   - Tolerated procedure well with no obvious cardiac complications  - Pain control  - Follow ID./ortho recs    History of CVA   - Continue ASA and statin    HTN   - BP: - BP: 133/60   - BP stable.    - Continue Losartan 25 QD Can up titrate if needed  - Continue home clonidine .1 BID, hydralazine 25 q8, lopressor 75 BID    - Monitor and replete lytes, keep K>4, Mg>2.  - All other medical needs as per primary team.  - Other cardiovascular workup will depend on clinical course.  - Will continue to follow.    Anne Raymundo, MS ANP, [facility]  Nurse Practitioner- Cardiology   Spectra #[phone]

## 2021-02-11 NOTE — DISCHARGE NOTE PROVIDER - CARE PROVIDERS DIRECT ADDRESSES
,DirectAddress_Unknown,[email or web address],DirectAddress_Unknown,[email or web address] ,[email or web address],[email or web address],DirectAddress_Unknown,[email or web address] ,DirectAddress_Unknown,[email or web address],DirectAddress_Unknown,[email or web address]

## 2021-02-12 ENCOUNTER — APPOINTMENT (OUTPATIENT)
Dept: PODIATRY | Facility: HOSPITAL | Age: 56
End: 2021-02-12

## 2021-02-12 DIAGNOSIS — E10.65 TYPE 1 DIABETES MELLITUS WITH HYPERGLYCEMIA: ICD-10-CM

## 2021-02-12 LAB
ANION GAP SERPL CALC-SCNC: 5 MMOL/L — SIGNIFICANT CHANGE UP (ref 5–17)
BUN SERPL-MCNC: 20 MG/DL — SIGNIFICANT CHANGE UP (ref 7–23)
CALCIUM SERPL-MCNC: 8.9 MG/DL — SIGNIFICANT CHANGE UP (ref 8.5–10.1)
CHLORIDE SERPL-SCNC: 103 MMOL/L — SIGNIFICANT CHANGE UP (ref 96–108)
CO2 SERPL-SCNC: 34 MMOL/L — HIGH (ref 22–31)
CREAT SERPL-MCNC: 1.2 MG/DL — SIGNIFICANT CHANGE UP (ref 0.5–1.3)
GLUCOSE SERPL-MCNC: 134 MG/DL — HIGH (ref 70–99)
HCT VFR BLD CALC: 42.8 % — SIGNIFICANT CHANGE UP (ref 39–50)
HGB BLD-MCNC: 14.2 G/DL — SIGNIFICANT CHANGE UP (ref 13–17)
MCHC RBC-ENTMCNC: 28.8 PG — SIGNIFICANT CHANGE UP (ref 27–34)
MCHC RBC-ENTMCNC: 33.2 GM/DL — SIGNIFICANT CHANGE UP (ref 32–36)
MCV RBC AUTO: 86.8 FL — SIGNIFICANT CHANGE UP (ref 80–100)
NRBC # BLD: 0 /100 WBCS — SIGNIFICANT CHANGE UP (ref 0–0)
PLATELET # BLD AUTO: 208 K/UL — SIGNIFICANT CHANGE UP (ref 150–400)
POTASSIUM SERPL-MCNC: 4 MMOL/L — SIGNIFICANT CHANGE UP (ref 3.5–5.3)
POTASSIUM SERPL-SCNC: 4 MMOL/L — SIGNIFICANT CHANGE UP (ref 3.5–5.3)
RBC # BLD: 4.93 M/UL — SIGNIFICANT CHANGE UP (ref 4.2–5.8)
RBC # FLD: 13.4 % — SIGNIFICANT CHANGE UP (ref 10.3–14.5)
SODIUM SERPL-SCNC: 142 MMOL/L — SIGNIFICANT CHANGE UP (ref 135–145)
WBC # BLD: 7.3 K/UL — SIGNIFICANT CHANGE UP (ref 3.8–10.5)
WBC # FLD AUTO: 7.3 K/UL — SIGNIFICANT CHANGE UP (ref 3.8–10.5)

## 2021-02-12 PROCEDURE — 99232 SBSQ HOSP IP/OBS MODERATE 35: CPT

## 2021-02-12 PROCEDURE — 99231 SBSQ HOSP IP/OBS SF/LOW 25: CPT

## 2021-02-12 RX ORDER — HEPARIN SODIUM 5000 [USP'U]/ML
5000 INJECTION INTRAVENOUS; SUBCUTANEOUS EVERY 8 HOURS
Refills: 0 | Status: DISCONTINUED | OUTPATIENT
Start: 2021-02-12 | End: 2021-02-16

## 2021-02-12 RX ORDER — GENTAMICIN SULFATE 3 MG/ML
1 SOLUTION/ DROPS OPHTHALMIC
Refills: 0 | Status: DISCONTINUED | OUTPATIENT
Start: 2021-02-12 | End: 2021-02-16

## 2021-02-12 RX ADMIN — Medication 6: at 16:41

## 2021-02-12 RX ADMIN — Medication 10 UNIT(S): at 16:41

## 2021-02-12 RX ADMIN — TACROLIMUS 1.5 MILLIGRAM(S): 5 CAPSULE ORAL at 21:34

## 2021-02-12 RX ADMIN — GABAPENTIN 300 MILLIGRAM(S): 400 CAPSULE ORAL at 04:40

## 2021-02-12 RX ADMIN — Medication 1 TABLET(S): at 10:15

## 2021-02-12 RX ADMIN — Medication 25 MILLIGRAM(S): at 13:08

## 2021-02-12 RX ADMIN — GENTAMICIN SULFATE 1 DROP(S): 3 SOLUTION/ DROPS OPHTHALMIC at 21:33

## 2021-02-12 RX ADMIN — AZATHIOPRINE 100 MILLIGRAM(S): 100 TABLET ORAL at 10:15

## 2021-02-12 RX ADMIN — Medication 75 MILLIGRAM(S): at 21:34

## 2021-02-12 RX ADMIN — HEPARIN SODIUM 5000 UNIT(S): 5000 INJECTION INTRAVENOUS; SUBCUTANEOUS at 13:09

## 2021-02-12 RX ADMIN — GABAPENTIN 300 MILLIGRAM(S): 400 CAPSULE ORAL at 16:06

## 2021-02-12 RX ADMIN — HEPARIN SODIUM 5000 UNIT(S): 5000 INJECTION INTRAVENOUS; SUBCUTANEOUS at 21:33

## 2021-02-12 RX ADMIN — LOSARTAN POTASSIUM 25 MILLIGRAM(S): 100 TABLET, FILM COATED ORAL at 04:40

## 2021-02-12 RX ADMIN — Medication 0.1 MILLIGRAM(S): at 04:40

## 2021-02-12 RX ADMIN — TACROLIMUS 1.5 MILLIGRAM(S): 5 CAPSULE ORAL at 09:35

## 2021-02-12 RX ADMIN — Medication 6: at 11:48

## 2021-02-12 RX ADMIN — INSULIN GLARGINE 35 UNIT(S): 100 INJECTION, SOLUTION SUBCUTANEOUS at 21:35

## 2021-02-12 RX ADMIN — Medication 10 UNIT(S): at 11:48

## 2021-02-12 RX ADMIN — Medication 25 MILLIGRAM(S): at 21:34

## 2021-02-12 RX ADMIN — CEFTRIAXONE 100 MILLIGRAM(S): 500 INJECTION, POWDER, FOR SOLUTION INTRAMUSCULAR; INTRAVENOUS at 16:06

## 2021-02-12 RX ADMIN — FAMOTIDINE 20 MILLIGRAM(S): 10 INJECTION INTRAVENOUS at 10:15

## 2021-02-12 RX ADMIN — ATORVASTATIN CALCIUM 10 MILLIGRAM(S): 80 TABLET, FILM COATED ORAL at 21:33

## 2021-02-12 RX ADMIN — Medication 25 MILLIGRAM(S): at 04:40

## 2021-02-12 RX ADMIN — Medication 75 MILLIGRAM(S): at 04:40

## 2021-02-12 RX ADMIN — Medication 0.1 MILLIGRAM(S): at 16:06

## 2021-02-12 NOTE — PROGRESS NOTE ADULT - PROBLEM SELECTOR PLAN 1
- Rt foot  3rd distal phalanx  open wound  probe to bone  c/w with OM , Abnormal X Ray foot with bone changes,  - afebrile, no leukocytosis   -continue Rocephin 2g IV daily as per ID    -XR R Foot: cortical erosions of right 3rd distal phalanx, however, no discrete om   -s/p right 3rd distal digit amputation with Dr. Araiza today-OR   -f/u BCx, TCx, path    -partial WBAT on heel with surgical boot, only short distances

## 2021-02-12 NOTE — PROGRESS NOTE ADULT - PROBLEM SELECTOR PLAN 4
Hypertension.    -continue home clonidine .1 mg BID , and metoprolol 75 mg BID and losartan 25 mg qD

## 2021-02-12 NOTE — PROGRESS NOTE ADULT - SUBJECTIVE AND OBJECTIVE BOX
[facility] -- Julisa Jansen, Luly Larson, Karan Kelly Savella  Office # [phone]      Follow Up:    HTN, Cardiac optimization pre/post op   Subjective/Observations:   No events overnight resting comfortably in bed.  No complaints of chest pain, dyspnea, or palpitations reported. No signs of orthopnea or PND.     REVIEW OF SYSTEMS: All other review of systems is negative unless indicated above    PAST MEDICAL & SURGICAL HISTORY:  Recurrent squamous cell carcinoma of skin  nose, L arm    History of DVT (deep vein thrombosis)    Squamous cell carcinoma of skin of left ear    CVA (cerebral vascular accident)    Obesity (BMI 30-39.9)    Diabetic peripheral neuropathy    CKD (chronic kidney disease)  Renal Transplant 2013 at [facility]    Diabetes mellitus    Hyperlipidemia    Hypertension    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    S/P kidney transplant    End-stage renal failure with renal transplant  12/2013    H/O foot surgery  2005 - right foot - bone fracture - s/p ORIF and subsequent removal of hardware    Vasectomy status  s/p b/l  vasectomy    Ear disease  Left inner ear surgery, 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 1000 milliLiter(s) (50 mL/Hr) IV Continuous <Continuous>  dextrose 5%. 1000 milliLiter(s) (100 mL/Hr) IV Continuous <Continuous>  dextrose 50% Injectable 25 Gram(s) IV Push once  dextrose 50% Injectable 25 Gram(s) IV Push once  dextrose 50% Injectable 12.5 Gram(s) IV Push once  famotidine    Tablet 20 milliGRAM(s) Oral daily  gabapentin 300 milliGRAM(s) Oral two times a day  glucagon  Injectable 1 milliGRAM(s) IntraMuscular once  heparin   Injectable 5000 Unit(s) SubCutaneous every 8 hours  hydrALAZINE 25 milliGRAM(s) Oral three times a day  insulin glargine Injectable (LANTUS) 35 Unit(s) SubCutaneous at bedtime  insulin lispro (ADMELOG) corrective regimen sliding scale   SubCutaneous three times a day before meals  insulin lispro (ADMELOG) corrective regimen sliding scale   SubCutaneous at bedtime  insulin lispro Injectable (ADMELOG) 10 Unit(s) SubCutaneous three times a day before meals  losartan 25 milliGRAM(s) Oral daily  metoprolol tartrate 75 milliGRAM(s) Oral two times a day  tacrolimus 1.5 milliGRAM(s) Oral two times a day  trimethoprim   80 mG/sulfamethoxazole 400 mG 1 Tablet(s) Oral daily    MEDICATIONS  (PRN):      Allergies    No Known Allergies    Intolerances        Vital Signs Last 24 Hrs  T(C): 36.7 (12 Feb 2021 13:08), Max: 36.9 (11 Feb 2021 20:46)  T(F): 98.1 (12 Feb 2021 13:08), Max: 98.4 (11 Feb 2021 20:46)  HR: 60 (12 Feb 2021 13:08) (60 - 69)  BP: 127/71 (12 Feb 2021 13:08) (127/71 - 169/71)  BP(mean): --  RR: 16 (12 Feb 2021 13:08) (16 - 18)  SpO2: 91% (12 Feb 2021 13:08) (90% - 94%)    I&O's Summary    11 Feb 2021 07:01  -  12 Feb 2021 07:00  --------------------------------------------------------  IN: 0 mL / OUT: 575 mL / NET: -575 mL          PHYSICAL EXAM:  TELE: Off tele   Constitutional: NAD, awake and alert, well-developed  HEENT: Moist Mucous Membranes, Anicteric  Pulmonary: Non-labored, breath sounds are clear bilaterally, No wheezing, crackles or rhonchi  Cardiovascular: Regular, S1 and S2 nl, No murmurs, rubs, gallops or clicks  Gastrointestinal: Bowel Sounds present, soft, nontender.   Lymph: No lymphadenopathy. No peripheral edema.  Skin: No visible rashes or ulcers.  Psych:  Mood & affect appropriate    LABS: All Labs Reviewed:                        14.2   7.30  )-----------( 208      ( 12 Feb 2021 06:45 )             42.8                         13.9   7.00  )-----------( 198      ( 11 Feb 2021 07:11 )             41.6                         14.6   6.34  )-----------( 204      ( 10 Feb 2021 10:11 )             43.8     12 Feb 2021 06:45    142    |  103    |  20     ----------------------------<  134    4.0     |  34     |  1.20   11 Feb 2021 07:11    139    |  104    |  19     ----------------------------<  306    4.4     |  30     |  1.10   10 Feb 2021 10:11    137    |  102    |  27     ----------------------------<  361    4.6     |  29     |  1.30     Ca    8.9        12 Feb 2021 06:45  Ca    8.7        11 Feb 2021 07:11  Ca    9.2        10 Feb 2021 10:11    TPro  7.0    /  Alb  3.3    /  TBili  0.6    /  DBili  x      /  AST  15     /  ALT  21     /  AlkPhos  120    11 Feb 2021 07:11  TPro  8.1    /  Alb  3.9    /  TBili  0.5    /  DBili  x      /  AST  15     /  ALT  24     /  AlkPhos  147    10 Feb 2021 10:11

## 2021-02-12 NOTE — PROGRESS NOTE ADULT - SUBJECTIVE AND OBJECTIVE BOX
CAPILLARY BLOOD GLUCOSE      POCT Blood Glucose.: 266 mg/dL (11 Feb 2021 21:22)  POCT Blood Glucose.: 293 mg/dL (11 Feb 2021 16:17)  POCT Blood Glucose.: 281 mg/dL (11 Feb 2021 11:04)  POCT Blood Glucose.: 334 mg/dL (11 Feb 2021 09:08)  POCT Blood Glucose.: 334 mg/dL (11 Feb 2021 07:34)      Vital Signs Last 24 Hrs  T(C): 36.5 (12 Feb 2021 04:14), Max: 37 (11 Feb 2021 09:04)  T(F): 97.7 (12 Feb 2021 04:14), Max: 98.6 (11 Feb 2021 09:04)  HR: 69 (12 Feb 2021 04:14) (58 - 75)  BP: 129/74 (12 Feb 2021 04:14) (109/64 - 170/75)  BP(mean): --  RR: 18 (12 Feb 2021 04:14) (13 - 20)  SpO2: 94% (12 Feb 2021 04:14) (90% - 98%)    General: WN/WD NAD  Respiratory: CTA B/L  CV: RRR, S1S2, no murmurs, rubs or gallops  Abdominal: Soft, NT, ND +BS, Last BM  Extremities: le foot dsg intact     02-11    139  |  104  |  19  ----------------------------<  306<H>  4.4   |  30  |  1.10    Ca    8.7      11 Feb 2021 07:11    TPro  7.0  /  Alb  3.3  /  TBili  0.6  /  DBili  x   /  AST  15  /  ALT  21  /  AlkPhos  120  02-11      atorvastatin 10 milliGRAM(s) Oral at bedtime  dextrose 40% Gel 15 Gram(s) Oral once  dextrose 50% Injectable 25 Gram(s) IV Push once  dextrose 50% Injectable 25 Gram(s) IV Push once  dextrose 50% Injectable 12.5 Gram(s) IV Push once  glucagon  Injectable 1 milliGRAM(s) IntraMuscular once  insulin glargine Injectable (LANTUS) 35 Unit(s) SubCutaneous at bedtime  insulin lispro (ADMELOG) corrective regimen sliding scale   SubCutaneous three times a day before meals  insulin lispro (ADMELOG) corrective regimen sliding scale   SubCutaneous at bedtime  insulin lispro Injectable (ADMELOG) 10 Unit(s) SubCutaneous three times a day before meals

## 2021-02-12 NOTE — PROGRESS NOTE ADULT - ASSESSMENT
54y old  Male who presents with a chief complaint of r/o osteomyelitis, scheduled for L third digit amputation and partial right 2nd digit amputation. S/P partial amputation of third right toe  Non-healing toe ulcers    Possible  OM.    -ID following  - s/p toe amputation left 3rd digit and right partial 2nd digit   - Tolerated procedure well with no obvious cardiac complications  - Pain control  - Follow ID./ortho recs    History of CVA   - Continue statin  -would resume ASA if cleared by ortho    HTN   - BP: 127/71 (02-12-21 @ 13:08) (127/71 - 169/71)  - BP stable.    - Continue Losartan 25 QD Can up titrate if needed  - Continue home clonidine 0.1 BID, hydralazine 25 q8, lopressor 75 BID    - Monitor and replete lytes, keep K>4, Mg>2.  - All other medical needs as per primary team.  - Other cardiovascular workup will depend on clinical course.  - Will continue to follow.    Maxi Tovar DNP, ANP-c  Cardiology   Spectra #[phone]  54y old  Male who presents with a chief complaint of r/o osteomyelitis, scheduled for L third digit amputation and partial right 2nd digit amputation. S/P partial amputation of third right toe  Non-healing toe ulcers    Possible  OM.    - ID following  - s/p toe amputation left 3rd digit and right partial 2nd digit   - Tolerated procedure well with no obvious cardiac complications  - Pain control  - Follow ID./ortho recs    History of CVA   - Continue statin  -would resume ASA if cleared by ortho    HTN   - BP: 127/71 (02-12-21 @ 13:08) (127/71 - 169/71)  - BP stable.    - Continue Losartan 25 QD Can up titrate if needed  - Continue home clonidine 0.1 BID, hydralazine 25 q8, lopressor 75 BID    - Monitor and replete lytes, keep K>4, Mg>2.  - All other medical needs as per primary team.  - Other cardiovascular workup will depend on clinical course.  - Will continue to follow.    Maxi Tovar DNP, ANP-c  Cardiology   Spectra #[phone]

## 2021-02-12 NOTE — PROGRESS NOTE ADULT - SUBJECTIVE AND OBJECTIVE BOX
55y year old Male seen at [facility] 2EAS 201 D1 for right foot 3rd digit osteomyelitis, s/p right foot third digit partial amputation.   Denies any fever, chills, nausea, vomiting, chest pain, shortness of breath, or calf pain at this time.    Allergies    No Known Allergies    Intolerances        MEDICATIONS  (STANDING):  atorvastatin 10 milliGRAM(s) Oral at bedtime  azaTHIOprine 100 milliGRAM(s) Oral daily  cefTRIAXone   IVPB 2000 milliGRAM(s) IV Intermittent every 24 hours  cloNIDine 0.1 milliGRAM(s) Oral two times a day  dextrose 40% Gel 15 Gram(s) Oral once  dextrose 5%. 1000 milliLiter(s) (50 mL/Hr) IV Continuous <Continuous>  dextrose 5%. 1000 milliLiter(s) (100 mL/Hr) IV Continuous <Continuous>  dextrose 50% Injectable 25 Gram(s) IV Push once  dextrose 50% Injectable 25 Gram(s) IV Push once  dextrose 50% Injectable 12.5 Gram(s) IV Push once  famotidine    Tablet 20 milliGRAM(s) Oral daily  gabapentin 300 milliGRAM(s) Oral two times a day  glucagon  Injectable 1 milliGRAM(s) IntraMuscular once  heparin   Injectable 5000 Unit(s) SubCutaneous every 8 hours  hydrALAZINE 25 milliGRAM(s) Oral three times a day  insulin glargine Injectable (LANTUS) 35 Unit(s) SubCutaneous at bedtime  insulin lispro (ADMELOG) corrective regimen sliding scale   SubCutaneous three times a day before meals  insulin lispro (ADMELOG) corrective regimen sliding scale   SubCutaneous at bedtime  insulin lispro Injectable (ADMELOG) 10 Unit(s) SubCutaneous three times a day before meals  losartan 25 milliGRAM(s) Oral daily  metoprolol tartrate 75 milliGRAM(s) Oral two times a day  tacrolimus 1.5 milliGRAM(s) Oral two times a day  trimethoprim   80 mG/sulfamethoxazole 400 mG 1 Tablet(s) Oral daily    MEDICATIONS  (PRN):      Vital Signs Last 24 Hrs  T(C): 36.7 (12 Feb 2021 13:08), Max: 36.9 (11 Feb 2021 20:46)  T(F): 98.1 (12 Feb 2021 13:08), Max: 98.4 (11 Feb 2021 20:46)  HR: 60 (12 Feb 2021 13:08) (60 - 69)  BP: 127/71 (12 Feb 2021 13:08) (127/71 - 169/71)  BP(mean): --  RR: 16 (12 Feb 2021 13:08) (16 - 18)  SpO2: 91% (12 Feb 2021 13:08) (90% - 94%)    PHYSICAL EXAM:  Vascular: DP palpable, PT dopplerable bilaterally, Capillary refill 3 seconds, Digital hair present bilaterally  Neurological: Light touch sensation diminished bilaterally  Musculoskeletal: 5/5 strength in all quadrants bilaterally, AJ & STJ ROM intact  Dermatological:  right foot 3rd digit surgical site is clean with no signs of infection.  Skin edges well coapted with sutures intact.    Digit appears and feels well perfused.      CBC Full  -  ( 12 Feb 2021 06:45 )  WBC Count : 7.30 K/uL  RBC Count : 4.93 M/uL  Hemoglobin : 14.2 g/dL  Hematocrit : 42.8 %  Platelet Count - Automated : 208 K/uL  Mean Cell Volume : 86.8 fl  Mean Cell Hemoglobin : 28.8 pg  Mean Cell Hemoglobin Concentration : 33.2 gm/dL  Auto Neutrophil # : x  Auto Lymphocyte # : x  Auto Monocyte # : x  Auto Eosinophil # : x  Auto Basophil # : x  Auto Neutrophil % : x  Auto Lymphocyte % : x  Auto Monocyte % : x  Auto Eosinophil % : x  Auto Basophil % : x      ----------CHEM PANEL----------          Culture - Tissue with Gram Stain (collected 11 Feb 2021 12:36)  Source: .Tissue Other, 3rd toe right foot head of mid  Gram Stain (11 Feb 2021 22:18):    Rare polymorphonuclear leukocytes    Rare Gram Positive Cocci per oil power field    Rare Gram Variable Rods per oil power field  Preliminary Report (12 Feb 2021 11:02):    No growth    Culture - Tissue with Gram Stain (collected 11 Feb 2021 12:35)  Source: .Tissue Other, 3rd toe right foot distal phal  Gram Stain (11 Feb 2021 22:19):    Rare polymorphonuclear leukocytes    Rare Gram positive cocci in pairs per oil power field    Rare Gram Variable Rods per oil power field  Preliminary Report (12 Feb 2021 11:03):    No growth    Culture - Tissue with Gram Stain (collected 11 Feb 2021 01:25)  Source: .Tissue Other, right foot  Gram Stain (11 Feb 2021 06:43):    No polymorphonuclear cells seen per low power field    No organisms seen per oil power field  Preliminary Report (12 Feb 2021 13:12):    Numerous Staphylococcus lugdunensis    Culture - Blood (collected 10 Feb 2021 16:30)  Source: .Blood Blood-Peripheral  Preliminary Report (11 Feb 2021 17:01):    No growth to date.    Culture - Blood (collected 10 Feb 2021 16:30)  Source: .Blood Blood-Peripheral  Preliminary Report (11 Feb 2021 17:01):    No growth to date.        Imaging: ----------

## 2021-02-12 NOTE — PROGRESS NOTE ADULT - SUBJECTIVE AND OBJECTIVE BOX
The patient was interviewed and evaluated.    55y Male    T(C): 36.5 (02-12-21 @ 04:14), Max: 36.9 (02-11-21 @ 09:39)  HR: 69 (02-12-21 @ 04:14) (58 - 69)  BP: 129/74 (02-12-21 @ 04:14) (109/64 - 169/71)  RR: 18 (02-12-21 @ 04:14) (13 - 20)  SpO2: 94% (02-12-21 @ 04:14) (90% - 98%)  Wt(kg): --    No Nausea/vomiting, recall, sore throat or headache.    No anesthesia related complaints or sequelae.

## 2021-02-12 NOTE — PROGRESS NOTE ADULT - ASSESSMENT
56 YO M with hx CKD s/p renal transplant, CVA, DM, neuropathy, DVT, HLD, Hypertension ,Obesity, squamous cell carcinoma, presents to ED for redness/swelling R leg. Patient states 3 days ago, he first noticed his toe nail on the R 3rd digit starting to "lift up" from a "small pimple" underneath. States there was also clear/red oozing. Patient made an appointment with Dr. Araiza for Friday of this week. Yesterday patient developed redness and swelling in the R lower leg and had blood sugars in the 300s which prompted him to come to the ED. Patient has peripheral neuropathy and states he has no pain in his legs. Denies any fever, chills, SOB, CP, N/V.     patient was admitted to [facility] last August for osteomyelitis and had left 3rd toe distal digit partial amputation and right foot distal digit 2nd digit on 8/27/20.     MEDICATIONS  (STANDING):  atorvastatin 10 milliGRAM(s) Oral at bedtime  azaTHIOprine 100 milliGRAM(s) Oral daily  cefTRIAXone   IVPB 2000 milliGRAM(s) IV Intermittent every 24 hours  cloNIDine 0.1 milliGRAM(s) Oral two times a day  dextrose 40% Gel 15 Gram(s) Oral once  dextrose 5%. 1000 milliLiter(s) (50 mL/Hr) IV Continuous <Continuous>  dextrose 5%. 1000 milliLiter(s) (100 mL/Hr) IV Continuous <Continuous>  dextrose 50% Injectable 25 Gram(s) IV Push once  dextrose 50% Injectable 12.5 Gram(s) IV Push once  dextrose 50% Injectable 25 Gram(s) IV Push once  famotidine    Tablet 20 milliGRAM(s) Oral daily  gabapentin 300 milliGRAM(s) Oral two times a day  glucagon  Injectable 1 milliGRAM(s) IntraMuscular once  heparin   Injectable 5000 Unit(s) IV Push once  hydrALAZINE 25 milliGRAM(s) Oral three times a day  insulin glargine Injectable (LANTUS) 25 Unit(s) SubCutaneous at bedtime  insulin lispro (ADMELOG) corrective regimen sliding scale   SubCutaneous three times a day before meals  insulin lispro (ADMELOG) corrective regimen sliding scale   SubCutaneous at bedtime  insulin lispro Injectable (ADMELOG) 8 Unit(s) SubCutaneous three times a day before meals  losartan 25 milliGRAM(s) Oral daily  metoprolol tartrate Oral Tab/Cap - Peds 75 milliGRAM(s) Oral two times a day  tacrolimus 1.5 milliGRAM(s) Oral two times a day  trimethoprim   80 mG/sulfamethoxazole 400 mG 1 Tablet(s) Oral daily

## 2021-02-12 NOTE — PROGRESS NOTE ADULT - PROBLEM SELECTOR PLAN 1
cont lantus 35 units qam  cont admelog 10 units 3x/day before meals  cont mod dose admelog scale coverage qac/qhs  cont cons cho diet  goal bg 100-180 in hosp setting  to adjust standing insulin dosages pending further bg numbers  insulin pump on hold during hospitalization as per patient's request

## 2021-02-12 NOTE — PROGRESS NOTE ADULT - SUBJECTIVE AND OBJECTIVE BOX
[facility], Division of Infectious Diseases  MEL Mckoy Y. Patel, S. Shah  [phone]    Name: LUTHER NORIEGA  Age: 55y  Gender: Male  MRN: [id number]  Note Date: 02-12-21    Interval History:  Patient seen and examined at bedside this morning  No acute overnight events. Afebrile  No complaints  Notes reviewed    Antibiotics:  cefTRIAXone   IVPB 2000 milliGRAM(s) IV Intermittent every 24 hours  trimethoprim   80 mG/sulfamethoxazole 400 mG 1 Tablet(s) Oral daily      Medications:  atorvastatin 10 milliGRAM(s) Oral at bedtime  azaTHIOprine 100 milliGRAM(s) Oral daily  cefTRIAXone   IVPB 2000 milliGRAM(s) IV Intermittent every 24 hours  cloNIDine 0.1 milliGRAM(s) Oral two times a day  dextrose 40% Gel 15 Gram(s) Oral once  dextrose 5%. 1000 milliLiter(s) IV Continuous <Continuous>  dextrose 5%. 1000 milliLiter(s) IV Continuous <Continuous>  dextrose 50% Injectable 25 Gram(s) IV Push once  dextrose 50% Injectable 25 Gram(s) IV Push once  dextrose 50% Injectable 12.5 Gram(s) IV Push once  famotidine    Tablet 20 milliGRAM(s) Oral daily  gabapentin 300 milliGRAM(s) Oral two times a day  glucagon  Injectable 1 milliGRAM(s) IntraMuscular once  heparin   Injectable 5000 Unit(s) SubCutaneous every 8 hours  hydrALAZINE 25 milliGRAM(s) Oral three times a day  insulin glargine Injectable (LANTUS) 35 Unit(s) SubCutaneous at bedtime  insulin lispro (ADMELOG) corrective regimen sliding scale   SubCutaneous three times a day before meals  insulin lispro (ADMELOG) corrective regimen sliding scale   SubCutaneous at bedtime  insulin lispro Injectable (ADMELOG) 10 Unit(s) SubCutaneous three times a day before meals  losartan 25 milliGRAM(s) Oral daily  metoprolol tartrate 75 milliGRAM(s) Oral two times a day  tacrolimus 1.5 milliGRAM(s) Oral two times a day  trimethoprim   80 mG/sulfamethoxazole 400 mG 1 Tablet(s) Oral daily      Review of Systems:  A 10-point review of systems was obtained.     Pertinent positives and negatives--  Constitutional: No fevers. No Chills. No Rigors.   Cardiovascular: No chest pain. No palpitations.  Respiratory: No shortness of breath. No cough.  Gastrointestinal: No nausea or vomiting. No diarrhea or constipation.   Psychiatric: Pleasant. Appropriate affect.    Review of systems otherwise negative except as previously noted.    Allergies: No Known Allergies    For details regarding the patient's past medical history, social history, family history, and other miscellaneous elements, please refer the initial infectious diseases consultation and/or the admitting history and physical examination for this admission.    Objective:  Vitals:   T(C): 36.5 (02-12-21 @ 04:14), Max: 36.9 (02-11-21 @ 20:46)  HR: 69 (02-12-21 @ 04:14) (61 - 69)  BP: 129/74 (02-12-21 @ 04:14) (129/74 - 169/71)  RR: 18 (02-12-21 @ 04:14) (18 - 19)  SpO2: 94% (02-12-21 @ 04:14) (90% - 94%)    Physical Examination:  General: no acute distress  HEENT: NC/AT, EOMI, anicteric, no oral lesions  Neck: supple, no palpable LAD  Cardio: S1, S2 heard, RRR, no murmurs  Resp: breath sounds heard bilaterally, no rales, wheezes or rhonchi  Abd: soft, NT, ND, + bowel sounds  Neuro: no obvious focal deficits  Ext: improving RLE edema and erythema. R foot dressed c/d/i  Skin: warm, dry, no visible rash      Laboratory Studies:  CBC:                       14.2   7.30  )-----------( 208      ( 12 Feb 2021 06:45 )             42.8     CMP: 02-12    142  |  103  |  20  ----------------------------<  134<H>  4.0   |  34<H>  |  1.20    Ca    8.9      12 Feb 2021 06:45    TPro  7.0  /  Alb  3.3  /  TBili  0.6  /  DBili  x   /  AST  15  /  ALT  21  /  AlkPhos  120  02-11    LIVER FUNCTIONS - ( 11 Feb 2021 07:11 )  Alb: 3.3 g/dL / Pro: 7.0 g/dL / ALK PHOS: 120 U/L / ALT: 21 U/L / AST: 15 U/L / GGT: x             Microbiology: reviewed    Culture - Tissue with Gram Stain (collected 02-11-21 @ 12:36)  Source: .Tissue Other, 3rd toe right foot head of mid  Gram Stain (02-11-21 @ 22:18):    Rare polymorphonuclear leukocytes    Rare Gram Positive Cocci per oil power field    Rare Gram Variable Rods per oil power field  Preliminary Report (02-12-21 @ 11:02):    No growth    Culture - Tissue with Gram Stain (collected 02-11-21 @ 12:35)  Source: .Tissue Other, 3rd toe right foot distal phal  Gram Stain (02-11-21 @ 22:19):    Rare polymorphonuclear leukocytes    Rare Gram positive cocci in pairs per oil power field    Rare Gram Variable Rods per oil power field  Preliminary Report (02-12-21 @ 11:03):    No growth    Culture - Tissue with Gram Stain (collected 02-11-21 @ 01:25)  Source: .Tissue Other, right foot  Gram Stain (02-11-21 @ 06:43):    No polymorphonuclear cells seen per low power field    No organisms seen per oil power field    Culture - Blood (collected 02-10-21 @ 16:30)  Source: .Blood Blood-Peripheral  Preliminary Report (02-11-21 @ 17:01):    No growth to date.    Culture - Blood (collected 02-10-21 @ 16:30)  Source: .Blood Blood-Peripheral  Preliminary Report (02-11-21 @ 17:01):    No growth to date.        Radiology: reviewed

## 2021-02-12 NOTE — PROGRESS NOTE ADULT - PROBLEM SELECTOR PLAN 1
Pt assessed  Surgical site cleansed with NS, applied aquacel DSD overlying  Pending cx/path  Pt is WBAT with surgical shoe only  Podiatry will continue to follow pt while in house    WOUND CARE INSTRUCTIONS  1) Keep dressing clean, dry, intact until follow up.    2) Monitor foot for signs of infection including redness, swelling, drainage, foul odor.   If any are noted, please come to ED immediately.   3) Please follow up with Dr. Araiza at the [facility] within 3-5 days of discharge.

## 2021-02-12 NOTE — PROGRESS NOTE ADULT - SUBJECTIVE AND OBJECTIVE BOX
Patient is a 55y old  Male who presents with a chief complaint of osteomyelitis (2021 14:24)     56 YO M with hx CKD s/p renal transplant, CVA, DM, neuropathy, DVT, HLD, Hypertension ,Obesity, squamous cell carcinoma, presents to ED for redness/swelling R leg. Patient states 3 days ago, he first noticed his toe nail on the R 3rd digit starting to "lift up" from a "small pimple" underneath. States there was also clear/red oozing. Patient made an appointment with Dr. Araiza for Friday of this week. Yesterday patient developed redness and swelling in the R lower leg and had blood sugars in the 300s which prompted him to come to the ED. Patient has peripheral neuropathy and states he has no pain in his legs. Denies any fever, chills, SOB, CP, N/V.     Of note, patient was admitted to [facility] last August for osteomyelitis and had left 3rd toe distal digit partial amputation and right foot distal digit 2nd digit on 20.       In the ED:   VS: T 98F, HR 76, /75, RR 16, SpO2 96% on RA  Labs: glucose 361, alk phos 147, UA neg, covid-19 neg  US duplex venous LE: neg for DVT   EKG: Sinus bradycardia with 1st degree AV block (unchanged from 2020)  XR R foot: cortical erosions of right 3rd distal phalanx  CXR: pending official read     S/p Vancomycin 1g IV, Rocephin 2g IV, 1L NS IV bolus     INTERVAL HPI:    21 - Pt seen and examined at bedside, s/p partial amputation of third digit of right foot. Patient states he feels well, no complaints. Erythema and swelling on RLE are improving on rocephin.     21 - Pt seen and examined at bedside. POD#1, remains afebrile, VSS. Patient offers no complaints. Improved blood glucose today. Continue rocephin for RLE cellulitis. Pending OR tissue cx, path.     OVERNIGHT EVENTS: none     Home Medications:  aspirin 325 mg oral tablet: 1 tab(s) orally once a day (10 Feb 2021 15:40)  azaTHIOprine 100 mg oral tablet: 1 tab(s) orally once a day (10 Feb 2021 15:40)  cloNIDine 0.1 mg oral tablet: 1 tab(s) orally 2 times a day (10 Feb 2021 15:40)  famotidine 20 mg oral tablet: 1 tab(s) orally once a day (10 Feb 2021 15:40)  gabapentin 300 mg oral capsule: 1 cap(s) orally 2 times a day (10 Feb 2021 15:40)  HumaLOG 100 units/mL injectable solution: 3 unit(s) injectable every hour via insulin pump. TDD: 72 units. (10 Feb 2021 15:40)  hydrALAZINE 25 mg oral tablet: 1 tab(s) orally 3 times a day (10 Feb 2021 15:40)  losartan 25 mg oral tablet: 1 tab(s) orally once a day (10 Feb 2021 15:40)  lovastatin 40 mg oral tablet: 1 tab(s) orally once a day (10 Feb 2021 15:40)  Metoprolol Tartrate 25 mg oral tablet: 3 tab(s) orally 2 times a day    *conf. with patient and pharmacy. 75mg dose, BID* (10 Feb 2021 15:40)  sulfamethoxazole-trimethoprim 400 mg-80 mg oral tablet: 1 tab(s) orally once a day (10 Feb 2021 15:40)  tacrolimus: 1.5 milligram(s) orally 2 times a day (10 Feb 2021 15:40)      MEDICATIONS  (STANDING):  atorvastatin 10 milliGRAM(s) Oral at bedtime  azaTHIOprine 100 milliGRAM(s) Oral daily  cefTRIAXone   IVPB 2000 milliGRAM(s) IV Intermittent every 24 hours  cloNIDine 0.1 milliGRAM(s) Oral two times a day  dextrose 40% Gel 15 Gram(s) Oral once  dextrose 5%. 1000 milliLiter(s) (50 mL/Hr) IV Continuous <Continuous>  dextrose 5%. 1000 milliLiter(s) (100 mL/Hr) IV Continuous <Continuous>  dextrose 50% Injectable 25 Gram(s) IV Push once  dextrose 50% Injectable 25 Gram(s) IV Push once  dextrose 50% Injectable 12.5 Gram(s) IV Push once  famotidine    Tablet 20 milliGRAM(s) Oral daily  gabapentin 300 milliGRAM(s) Oral two times a day  glucagon  Injectable 1 milliGRAM(s) IntraMuscular once  hydrALAZINE 25 milliGRAM(s) Oral three times a day  insulin glargine Injectable (LANTUS) 35 Unit(s) SubCutaneous at bedtime  insulin lispro (ADMELOG) corrective regimen sliding scale   SubCutaneous three times a day before meals  insulin lispro (ADMELOG) corrective regimen sliding scale   SubCutaneous at bedtime  insulin lispro Injectable (ADMELOG) 10 Unit(s) SubCutaneous three times a day before meals  losartan 25 milliGRAM(s) Oral daily  metoprolol tartrate 75 milliGRAM(s) Oral two times a day  tacrolimus 1.5 milliGRAM(s) Oral two times a day  trimethoprim   80 mG/sulfamethoxazole 400 mG 1 Tablet(s) Oral daily    MEDICATIONS  (PRN):      No Known Allergies      Social History:  , lives with wife and 3 kids, retired, social drinker, denies tobacco or recreational drug use, independent in ADLs, walks without assistance (10 Feb 2021 15:11)      REVIEW OF SYSTEMS: i am OK  CONSTITUTIONAL: No fever, No chills, No fatigue, No myalgia, No Body ache, No Weakness  EYES: No eye pain,  No visual disturbances, No discharge, No Redness  ENMT: No ear pain, No nose bleed, No vertigo; No sinus pain, No throat pain, No Congestion  NECK: No pain, No stiffness  RESPIRATORY: No cough, No wheezing, No hemoptysis, No shortness of breath  CARDIOVASCULAR: No chest pain, No palpitations  GASTROINTESTINAL: No abdominal pain, No epigastric pain. No nausea, No vomiting, No diarrhea, No constipation; [x  ] BM  GENITOURINARY: No dysuria, No frequency, No urgency, No hematuria, No incontinence  NEUROLOGICAL: No headaches, No dizziness, No numbness, No tingling, No tremors, No weakness  EXTREMITIES: No Swelling, No Pain, No Edema  SKIN: [x  ] No itching, burning, rashes, or lesions   MUSCULOSKELETAL: No joint pain, No joint swelling; No muscle pain, No back pain, No extremity pain  PSYCHIATRIC: No depression, No anxiety, No mood swings, No difficulty sleeping at night  PAIN SCALE: [ x ] None  [  ] Other-  ROS Unable to obtain due to: [  ] Dementia  [  ] Lethargy  [  ] Sedated  [  ] Non verbal  REST OF REVIEW OF SYSTEMS: [ x ] Normal     Vital Signs Last 24 Hrs  T(C): 36.5 (2021 04:14), Max: 36.9 (2021 20:46)  T(F): 97.7 (2021 04:14), Max: 98.4 (2021 20:46)  HR: 69 (2021 04:14) (61 - 69)  BP: 129/74 (2021 04:14) (129/74 - 169/71)  BP(mean): --  RR: 18 (2021 04:14) (18 - 19)  SpO2: 94% (2021 04:14) (90% - 94%)    Finger Stick      I&O's Summary    PHYSICAL EXAM:  GENERAL:  [ x ] NAD, [x  ] Well appearing, [  ] Agitated, [  ] Drowsy, [  ] Lethargy, [  ] Confused   HEAD:  [x  ] Normal, [  ] Other  EYES:  [ x ] EOMI, [ x ] PERRLA, [x  ] Conjunctiva and sclera clear normal, [  ] Other, [  ] Pallor, [  ] Discharge  ENMT:  [ x ] Normal, [x  ] Moist mucous membranes, [x  ] Good dentition, [x  ] No thrush  NECK:  [x  ] Supple, [x  ] No JVD, [x  ] Normal thyroid, [  ] Lymphadenopathy, [  ] Other  CHEST/LUNG:  [ x ] Clear to auscultation bilaterally, [x  ] Breath Sounds equal B/L  [  ] Poor effort, [x  ] No rales, [x  ] No rhonchi, [ x ] No wheezing  HEART:  [x  ] Regular rate and rhythm, [  ] Tachycardia, [  ] Bradycardia, [  ] Irregular, [ x ] No murmurs, No rubs, No gallops, [  ] PPM in place (Mfr:  )  ABDOMEN:  [x  ] Soft, [ x ] Nontender, [x  ] Nondistended, [ x ] No mass, [ x ] Bowel sounds present, [  ] Obese  NERVOUS SYSTEM:  [x  ] Alert & Oriented x3, [ x ] Nonfocal, [  ] Confusion, [  ] Encephalopathic, [  ] Sedated, [  ] Unable to assess, [  ] Dementia, [  ] Other-  EXTREMITIES:  [ x ] 2+ Peripheral Pulses, No clubbing, No cyanosis,  [ x ] Edema Rt  lower EXT, with improving erythema, warmth  [x  ] PVD stasis skin changes B/L lower EXT, [x  ] Wound dressing Rt Foot, pt has had amputations b/l foot few toes   LYMPH:  No lymphadenopathy noted  SKIN:  [  ] No rashes or lesions, [  ] Pressure ulcers, [  ] Ecchymosis, [  ] Skin tears, [ x ] erythema and edema RLE from ankle to mid shin, improving     DIET: Diet, DASH/TLC:   Sodium & Cholesterol Restricted  Consistent Carbohydrate Evening Snack (21 @ 09:19)    LABS:  cret                        14.2   7.30  )-----------( 208      ( 2021 06:45 )             42.8         142  |  103  |  20  ----------------------------<  134<H>  4.0   |  34<H>  |  1.20    Ca    8.9      2021 06:45    TPro  7.0  /  Alb  3.3  /  TBili  0.6  /  DBili  x   /  AST  15  /  ALT  21  /  AlkPhos  120  0211    Urinalysis Basic - ( 10 Feb 2021 12:02 )    Color: Pale Yellow / Appearance: Slightly Turbid / S.010 / pH: x  Gluc: x / Ketone: Negative  / Bili: Negative / Urobili: Negative   Blood: x / Protein: 30 mg/dL / Nitrite: Negative   Leuk Esterase: Negative / RBC: 6-10 /HPF / WBC x   Sq Epi: x / Non Sq Epi: Occasional / Bacteria: x        culture blood  -- .Tissue Other, right foot  @ 01:25    culture urine  --   @ 01:25          Culture - Tissue with Gram Stain (collected 2021 01:25)  Source: .Tissue Other, right foot  Gram Stain (2021 06:43):    No polymorphonuclear cells seen per low power field    No organisms seen per oil power field    RADIOLOGY & ADDITIONAL TESTS: NONE      HEALTH ISSUES - PROBLEM Dx:  HLD (hyperlipidemia)  HLD (hyperlipidemia)    Neuropathy  Neuropathy    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    HTN (hypertension)  HTN (hypertension)    Need for prophylactic measure  Need for prophylactic measure    Renal transplant recipient  Renal transplant recipient    CKD (chronic kidney disease)  CKD (chronic kidney disease)    CVA (cerebral vascular accident)  CVA (cerebral vascular accident)    Diabetes mellitus  Diabetes mellitus    Cellulitis  Cellulitis    Osteomyelitis of right foot, unspecified type  Osteomyelitis of right foot, unspecified type    Consultant(s) Notes Reviewed:  [ x ] YES     Care Discussed with [ x ] Consultants, [ x ] Patient, [  ] Family, [  ] HCP, [  ] , [  ] Social Service, [x  ] RN, [  ] Physical Therapy, [  ] Palliative Care Team  DVT PPX: [  ] Lovenox, [  ] SC Heparin, [  ] Coumadin, [  ] Xarelto, [  ] Eliquis, [  ] Pradaxa, [  ] IV Heparin drip, [ x ] SCD, [  ] Ambulation, [  ] Contraindicated 2/2 GI Bleed, [  ] Contraindicated 2/2  Bleed, [  ] Contraindicated 2/2 Brain Bleed  Advanced Directive: [ x ] None, [  ] DNR/DNI Patient is a 55y old  Male who presents with a chief complaint of osteomyelitis (2021 14:24)     56 YO M with hx CKD s/p renal transplant, CVA, DM, neuropathy, DVT, HLD, Hypertension ,Obesity, squamous cell carcinoma, presents to ED for redness/swelling R leg. Patient states 3 days ago, he first noticed his toe nail on the R 3rd digit starting to "lift up" from a "small pimple" underneath. States there was also clear/red oozing. Patient made an appointment with Dr. Araiza for Friday of this week. Yesterday patient developed redness and swelling in the R lower leg and had blood sugars in the 300s which prompted him to come to the ED. Patient has peripheral neuropathy and states he has no pain in his legs. Denies any fever, chills, SOB, CP, N/V.     Of note, patient was admitted to [facility] last August for osteomyelitis and had left 3rd toe distal digit partial amputation and right foot distal digit 2nd digit on 20.       In the ED:   VS: T 98F, HR 76, /75, RR 16, SpO2 96% on RA  Labs: glucose 361, alk phos 147, UA neg, covid-19 neg  US duplex venous LE: neg for DVT   EKG: Sinus bradycardia with 1st degree AV block (unchanged from 2020)  XR R foot: cortical erosions of right 3rd distal phalanx  CXR: pending official read     S/p Vancomycin 1g IV, Rocephin 2g IV, 1L NS IV bolus     INTERVAL HPI:    21 - Pt seen and examined at bedside, s/p partial amputation of third digit of right foot. Patient states he feels well, no complaints. Erythema and swelling on RLE are improving on rocephin.     21 - Pt seen and examined at bedside. POD#1, remains afebrile, VSS. Patient offers no complaints. Improved blood glucose today. Continue rocephin for RLE cellulitis. Pending OR tissue cx, path. On IV Antibiotics.    OVERNIGHT EVENTS: none     Home Medications:  aspirin 325 mg oral tablet: 1 tab(s) orally once a day (10 Feb 2021 15:40)  azaTHIOprine 100 mg oral tablet: 1 tab(s) orally once a day (10 Feb 2021 15:40)  cloNIDine 0.1 mg oral tablet: 1 tab(s) orally 2 times a day (10 Feb 2021 15:40)  famotidine 20 mg oral tablet: 1 tab(s) orally once a day (10 Feb 2021 15:40)  gabapentin 300 mg oral capsule: 1 cap(s) orally 2 times a day (10 Feb 2021 15:40)  HumaLOG 100 units/mL injectable solution: 3 unit(s) injectable every hour via insulin pump. TDD: 72 units. (10 Feb 2021 15:40)  hydrALAZINE 25 mg oral tablet: 1 tab(s) orally 3 times a day (10 Feb 2021 15:40)  losartan 25 mg oral tablet: 1 tab(s) orally once a day (10 Feb 2021 15:40)  lovastatin 40 mg oral tablet: 1 tab(s) orally once a day (10 Feb 2021 15:40)  Metoprolol Tartrate 25 mg oral tablet: 3 tab(s) orally 2 times a day    *conf. with patient and pharmacy. 75mg dose, BID* (10 Feb 2021 15:40)  sulfamethoxazole-trimethoprim 400 mg-80 mg oral tablet: 1 tab(s) orally once a day (10 Feb 2021 15:40)  tacrolimus: 1.5 milligram(s) orally 2 times a day (10 Feb 2021 15:40)      MEDICATIONS  (STANDING):  atorvastatin 10 milliGRAM(s) Oral at bedtime  azaTHIOprine 100 milliGRAM(s) Oral daily  cefTRIAXone   IVPB 2000 milliGRAM(s) IV Intermittent every 24 hours  cloNIDine 0.1 milliGRAM(s) Oral two times a day  dextrose 40% Gel 15 Gram(s) Oral once  dextrose 5%. 1000 milliLiter(s) (50 mL/Hr) IV Continuous <Continuous>  dextrose 5%. 1000 milliLiter(s) (100 mL/Hr) IV Continuous <Continuous>  dextrose 50% Injectable 25 Gram(s) IV Push once  dextrose 50% Injectable 25 Gram(s) IV Push once  dextrose 50% Injectable 12.5 Gram(s) IV Push once  famotidine    Tablet 20 milliGRAM(s) Oral daily  gabapentin 300 milliGRAM(s) Oral two times a day  glucagon  Injectable 1 milliGRAM(s) IntraMuscular once  hydrALAZINE 25 milliGRAM(s) Oral three times a day  insulin glargine Injectable (LANTUS) 35 Unit(s) SubCutaneous at bedtime  insulin lispro (ADMELOG) corrective regimen sliding scale   SubCutaneous three times a day before meals  insulin lispro (ADMELOG) corrective regimen sliding scale   SubCutaneous at bedtime  insulin lispro Injectable (ADMELOG) 10 Unit(s) SubCutaneous three times a day before meals  losartan 25 milliGRAM(s) Oral daily  metoprolol tartrate 75 milliGRAM(s) Oral two times a day  tacrolimus 1.5 milliGRAM(s) Oral two times a day  trimethoprim   80 mG/sulfamethoxazole 400 mG 1 Tablet(s) Oral daily    MEDICATIONS  (PRN):      No Known Allergies      Social History:  , lives with wife and 3 kids, retired, social drinker, denies tobacco or recreational drug use, independent in ADLs, walks without assistance (10 Feb 2021 15:11)      REVIEW OF SYSTEMS: i am OK  CONSTITUTIONAL: No fever, No chills, No fatigue, No myalgia, No Body ache, No Weakness  EYES: No eye pain,  No visual disturbances, No discharge, No Redness  ENMT: No ear pain, No nose bleed, No vertigo; No sinus pain, No throat pain, No Congestion  NECK: No pain, No stiffness  RESPIRATORY: No cough, No wheezing, No hemoptysis, No shortness of breath  CARDIOVASCULAR: No chest pain, No palpitations  GASTROINTESTINAL: No abdominal pain, No epigastric pain. No nausea, No vomiting, No diarrhea, No constipation; [x  ] BM  GENITOURINARY: No dysuria, No frequency, No urgency, No hematuria, No incontinence  NEUROLOGICAL: No headaches, No dizziness, No numbness, No tingling, No tremors, No weakness  EXTREMITIES: No Swelling, No Pain, No Edema  SKIN: [x  ] No itching, burning, rashes, or lesions   MUSCULOSKELETAL: No joint pain, No joint swelling; No muscle pain, No back pain, No extremity pain  PSYCHIATRIC: No depression, No anxiety, No mood swings, No difficulty sleeping at night  PAIN SCALE: [ x ] None  [  ] Other-  ROS Unable to obtain due to: [  ] Dementia  [  ] Lethargy  [  ] Sedated  [  ] Non verbal  REST OF REVIEW OF SYSTEMS: [ x ] Normal     Vital Signs Last 24 Hrs  T(C): 36.5 (2021 04:14), Max: 36.9 (2021 20:46)  T(F): 97.7 (2021 04:14), Max: 98.4 (2021 20:46)  HR: 69 (2021 04:14) (61 - 69)  BP: 129/74 (2021 04:14) (129/74 - 169/71)  BP(mean): --  RR: 18 (2021 04:14) (18 - 19)  SpO2: 94% (2021 04:14) (90% - 94%)    Finger Stick      I&O's Summary    PHYSICAL EXAM:  GENERAL:  [ x ] NAD, [x  ] Well appearing, [  ] Agitated, [  ] Drowsy, [  ] Lethargy, [  ] Confused   HEAD:  [x  ] Normal, [  ] Other  EYES:  [ x ] EOMI, [ x ] PERRLA, [x  ] Conjunctiva and sclera clear normal, [  ] Other, [  ] Pallor, [  ] Discharge  ENMT:  [ x ] Normal, [x  ] Moist mucous membranes, [x  ] Good dentition, [x  ] No thrush  NECK:  [x  ] Supple, [x  ] No JVD, [x  ] Normal thyroid, [  ] Lymphadenopathy, [  ] Other  CHEST/LUNG:  [ x ] Clear to auscultation bilaterally, [x  ] Breath Sounds equal B/L  [  ] Poor effort, [x  ] No rales, [x  ] No rhonchi, [ x ] No wheezing  HEART:  [x  ] Regular rate and rhythm, [  ] Tachycardia, [  ] Bradycardia, [  ] Irregular, [ x ] No murmurs, No rubs, No gallops, [  ] PPM in place (Mfr:  )  ABDOMEN:  [x  ] Soft, [ x ] Nontender, [x  ] Nondistended, [ x ] No mass, [ x ] Bowel sounds present, [  ] Obese  NERVOUS SYSTEM:  [x  ] Alert & Oriented x3, [ x ] Nonfocal, [  ] Confusion, [  ] Encephalopathic, [  ] Sedated, [  ] Unable to assess, [  ] Dementia, [  ] Other-  EXTREMITIES:  [ x ] 2+ Peripheral Pulses, No clubbing, No cyanosis,  [ x ] Edema Rt  lower EXT, with improving erythema, swelling warmth  [x  ] PVD stasis skin changes B/L lower EXT, [x  ] Wound dressing Rt Foot, pt has had amputations left foot toes   LYMPH:  No lymphadenopathy noted  SKIN:  [  ] No rashes or lesions, [  ] Pressure ulcers, [  ] Ecchymosis, [  ] Skin tears, [ x ] erythema and edema RLE from ankle to mid shin, improving     DIET: Diet, DASH/TLC:   Sodium & Cholesterol Restricted  Consistent Carbohydrate Evening Snack (21 @ 09:19)    LABS:  cret                        14.2   7.30  )-----------( 208      ( 2021 06:45 )             42.8         142  |  103  |  20  ----------------------------<  134<H>  4.0   |  34<H>  |  1.20    Ca    8.9      2021 06:45    TPro  7.0  /  Alb  3.3  /  TBili  0.6  /  DBili  x   /  AST  15  /  ALT  21  /  AlkPhos  120  0211    Urinalysis Basic - ( 10 Feb 2021 12:02 )    Color: Pale Yellow / Appearance: Slightly Turbid / S.010 / pH: x  Gluc: x / Ketone: Negative  / Bili: Negative / Urobili: Negative   Blood: x / Protein: 30 mg/dL / Nitrite: Negative   Leuk Esterase: Negative / RBC: 6-10 /HPF / WBC x   Sq Epi: x / Non Sq Epi: Occasional / Bacteria: x        culture blood  -- .Tissue Other, right foot  @ 01:25    culture urine  --   @ 01:25      Culture - Tissue with Gram Stain (collected 2021 01:25)  Source: .Tissue Other, right foot  Gram Stain (2021 06:43):    No polymorphonuclear cells seen per low power field    No organisms seen per oil power field    RADIOLOGY & ADDITIONAL TESTS: NONE      HEALTH ISSUES - PROBLEM Dx:  HLD (hyperlipidemia)  HLD (hyperlipidemia)    Neuropathy  Neuropathy    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    HTN (hypertension)  HTN (hypertension)    Need for prophylactic measure  Need for prophylactic measure    Renal transplant recipient  Renal transplant recipient    CKD (chronic kidney disease)  CKD (chronic kidney disease)    CVA (cerebral vascular accident)  CVA (cerebral vascular accident)    Diabetes mellitus  Diabetes mellitus    Cellulitis  Cellulitis    Osteomyelitis of right foot, unspecified type  Osteomyelitis of right foot, unspecified type    Consultant(s) Notes Reviewed:  [ x ] YES     Care Discussed with [ x ] Consultants, [ x ] Patient, [  ] Family, [  ] HCP, [  ] , [  ] Social Service, [x  ] RN, [  ] Physical Therapy, [  ] Palliative Care Team  DVT PPX: [  ] Lovenox, [  ] SC Heparin, [  ] Coumadin, [  ] Xarelto, [  ] Eliquis, [  ] Pradaxa, [  ] IV Heparin drip, [ x ] SCD, [  ] Ambulation, [  ] Contraindicated 2/2 GI Bleed, [  ] Contraindicated 2/2  Bleed, [  ] Contraindicated 2/2 Brain Bleed  Advanced Directive: [ x ] None, [  ] DNR/DNI

## 2021-02-12 NOTE — PROGRESS NOTE ADULT - SUBJECTIVE AND OBJECTIVE BOX
Patient is a 55y Male whom presented to the hospital with s/p End-stage renal failure with renal transplant  12/2013      PAST MEDICAL & SURGICAL HISTORY:  Recurrent squamous cell carcinoma of skin  nose, L arm    History of DVT (deep vein thrombosis)    Squamous cell carcinoma of skin of left ear    CVA (cerebral vascular accident)    Obesity (BMI 30-39.9)    Diabetic peripheral neuropathy    CKD (chronic kidney disease)    Diabetes mellitus    Hyperlipidemia    Hypertension    S/P kidney transplant    End-stage renal failure with renal transplant  12/2013    H/O foot surgery  2005 - right foot - bone fracture - s/p ORIF and subsequent removal of hardware    Vasectomy status  s/p b/l  vasectomy    Ear disease  Left inner ear surgery    Toe infection  2007 L 4th Toe surgery-amputation secondary to osteomyelitis        MEDICATIONS  (STANDING):  atorvastatin 10 milliGRAM(s) Oral at bedtime  azaTHIOprine 100 milliGRAM(s) Oral daily  cefTRIAXone   IVPB 2000 milliGRAM(s) IV Intermittent every 24 hours  cloNIDine 0.1 milliGRAM(s) Oral two times a day  dextrose 40% Gel 15 Gram(s) Oral once  dextrose 5%. 1000 milliLiter(s) (50 mL/Hr) IV Continuous <Continuous>  dextrose 5%. 1000 milliLiter(s) (100 mL/Hr) IV Continuous <Continuous>  dextrose 50% Injectable 25 Gram(s) IV Push once  dextrose 50% Injectable 12.5 Gram(s) IV Push once  dextrose 50% Injectable 25 Gram(s) IV Push once  famotidine    Tablet 20 milliGRAM(s) Oral daily  gabapentin 300 milliGRAM(s) Oral two times a day  glucagon  Injectable 1 milliGRAM(s) IntraMuscular once  heparin   Injectable 5000 Unit(s) IV Push once  hydrALAZINE 25 milliGRAM(s) Oral three times a day  insulin glargine Injectable (LANTUS) 25 Unit(s) SubCutaneous at bedtime  insulin lispro (ADMELOG) corrective regimen sliding scale   SubCutaneous three times a day before meals  insulin lispro (ADMELOG) corrective regimen sliding scale   SubCutaneous at bedtime  insulin lispro Injectable (ADMELOG) 8 Unit(s) SubCutaneous three times a day before meals  losartan 25 milliGRAM(s) Oral daily  metoprolol tartrate Oral Tab/Cap - Peds 75 milliGRAM(s) Oral two times a day  tacrolimus 1.5 milliGRAM(s) Oral two times a day  trimethoprim   80 mG/sulfamethoxazole 400 mG 1 Tablet(s) Oral daily      Allergies    No Known Allergies    Intolerances        SOCIAL HISTORY:  Denies ETOh,Smoking,     FAMILY HISTORY:  FH: skin cancer    Family history of diabetes mellitus (DM)        REVIEW OF SYSTEMS:  CONSTITUTIONAL: No weakness, fevers or chills  NECK: No pain or stiffness  RESPIRATORY: No cough, wheezing, hemoptysis; No shortness of breath  CARDIOVASCULAR: No chest pain or palpitations  GASTROINTESTINAL: No abdominal or epigastric pain. No nausea, vomiting,     No diarrhea or constipation. No melena   GENITOURINARY: No dysuria, frequency or hematuria                                               14.2   7.30  )-----------( 208      ( 12 Feb 2021 06:45 )             42.8       CBC Full  -  ( 12 Feb 2021 06:45 )  WBC Count : 7.30 K/uL  RBC Count : 4.93 M/uL  Hemoglobin : 14.2 g/dL  Hematocrit : 42.8 %  Platelet Count - Automated : 208 K/uL  Mean Cell Volume : 86.8 fl  Mean Cell Hemoglobin : 28.8 pg  Mean Cell Hemoglobin Concentration : 33.2 gm/dL  Auto Neutrophil # : x  Auto Lymphocyte # : x  Auto Monocyte # : x  Auto Eosinophil # : x  Auto Basophil # : x  Auto Neutrophil % : x  Auto Lymphocyte % : x  Auto Monocyte % : x  Auto Eosinophil % : x  Auto Basophil % : x      02-12    142  |  103  |  20  ----------------------------<  134<H>  4.0   |  34<H>  |  1.20    Ca    8.9      12 Feb 2021 06:45    TPro  7.0  /  Alb  3.3  /  TBili  0.6  /  DBili  x   /  AST  15  /  ALT  21  /  AlkPhos  120  02-11      CAPILLARY BLOOD GLUCOSE      POCT Blood Glucose.: 261 mg/dL (12 Feb 2021 16:29)  POCT Blood Glucose.: 253 mg/dL (12 Feb 2021 11:44)  POCT Blood Glucose.: 138 mg/dL (12 Feb 2021 07:40)  POCT Blood Glucose.: 266 mg/dL (11 Feb 2021 21:22)      Vital Signs Last 24 Hrs  T(C): 36.7 (12 Feb 2021 13:08), Max: 36.9 (11 Feb 2021 20:46)  T(F): 98.1 (12 Feb 2021 13:08), Max: 98.4 (11 Feb 2021 20:46)  HR: 60 (12 Feb 2021 13:08) (60 - 69)  BP: 127/71 (12 Feb 2021 13:08) (127/71 - 162/61)  BP(mean): --  RR: 16 (12 Feb 2021 13:08) (16 - 18)  SpO2: 91% (12 Feb 2021 13:08) (90% - 94%)              PHYSICAL EXAM:    Constitutional: NAD  HEENT: conjunctive   clear   Neck:  No JVD  Respiratory: CTAB  Cardiovascular: S1 and S2  Gastrointestinal: BS+, soft,   Neurological: A/O x 3, no focal deficits  Skin:  R foot 3rd toe infection, r shin redness

## 2021-02-12 NOTE — PROGRESS NOTE ADULT - PROBLEM SELECTOR PLAN 2
-RLE warmth, swelling, redness below knee x1 day Toe OM  -responding well to IV abx  -continue Rocephin 2g IV daily

## 2021-02-12 NOTE — PROGRESS NOTE ADULT - PROBLEM SELECTOR PLAN 3
DM type 1, hyperglycemic on admission, bg in 300s  -patient is on insulin pump 3 units/hr basal rate x 24 hrs, uses humalog: will hold insulin pump for now as patient's site got disconnected when changing his clothes today (on prior admission had kept pump at 70% basal rate ie 2.1 units/hr x 24 hrs)  -mod dose ISS  -continue lantus 35 units at bedtime  -continue 10 units premeal   -patient prefers MDII, hold insulin pump    -A1c 10  -hypogycemia protocol   -Endocrine Dr. Craig Perlman consulted; recs appreciated

## 2021-02-12 NOTE — PROGRESS NOTE ADULT - ASSESSMENT
56 YO M with hx CKD s/p renal transplant, CVA, DM, neuropathy, DVT, HLD, Hypertension ,Obesity, squamous cell carcinoma, presents to ED for redness/swelling R leg, admitted for osteomyelitis of R 3rd distal phalanx

## 2021-02-12 NOTE — PROGRESS NOTE ADULT - ASSESSMENT
Pt is a 55M w/ PMHx of ESR s/p RT, DM, HLD, HTN p/w redness/swelling R leg, seen by podiatry now s/p 3rd ray amputation of R foot    R foot OM/RLE cellulitis  s/p partial amputation of third digit of right foot on 2/11  -pending BCx--NGTD  -pending OR TCx--rare GPC in pairs  -pending path  -imaging reviewed, no OM, no DVT  Previous cx reviewed, CONS  -MRSA swab--negative  -c/w ceftriaxone 2gm q24h  Will change therapy and determine duration based on above cx and path report    Renal Txp  -c/w bactrim for PJP ppx

## 2021-02-13 LAB
-  AMPICILLIN/SULBACTAM: SIGNIFICANT CHANGE UP
-  CEFAZOLIN: SIGNIFICANT CHANGE UP
-  CLINDAMYCIN: SIGNIFICANT CHANGE UP
-  ERYTHROMYCIN: SIGNIFICANT CHANGE UP
-  GENTAMICIN: SIGNIFICANT CHANGE UP
-  OXACILLIN: SIGNIFICANT CHANGE UP
-  PENICILLIN: SIGNIFICANT CHANGE UP
-  RIFAMPIN: SIGNIFICANT CHANGE UP
-  TETRACYCLINE: SIGNIFICANT CHANGE UP
-  TRIMETHOPRIM/SULFAMETHOXAZOLE: SIGNIFICANT CHANGE UP
-  VANCOMYCIN: SIGNIFICANT CHANGE UP
ALBUMIN SERPL ELPH-MCNC: 3.3 G/DL — SIGNIFICANT CHANGE UP (ref 3.3–5)
ALP SERPL-CCNC: 124 U/L — HIGH (ref 40–120)
ALT FLD-CCNC: 19 U/L — SIGNIFICANT CHANGE UP (ref 12–78)
ANION GAP SERPL CALC-SCNC: 5 MMOL/L — SIGNIFICANT CHANGE UP (ref 5–17)
AST SERPL-CCNC: 12 U/L — LOW (ref 15–37)
BASOPHILS # BLD AUTO: 0.05 K/UL — SIGNIFICANT CHANGE UP (ref 0–0.2)
BASOPHILS NFR BLD AUTO: 0.6 % — SIGNIFICANT CHANGE UP (ref 0–2)
BILIRUB SERPL-MCNC: 0.6 MG/DL — SIGNIFICANT CHANGE UP (ref 0.2–1.2)
BUN SERPL-MCNC: 15 MG/DL — SIGNIFICANT CHANGE UP (ref 7–23)
CALCIUM SERPL-MCNC: 9.3 MG/DL — SIGNIFICANT CHANGE UP (ref 8.5–10.1)
CHLORIDE SERPL-SCNC: 100 MMOL/L — SIGNIFICANT CHANGE UP (ref 96–108)
CO2 SERPL-SCNC: 34 MMOL/L — HIGH (ref 22–31)
CREAT SERPL-MCNC: 1.2 MG/DL — SIGNIFICANT CHANGE UP (ref 0.5–1.3)
EOSINOPHIL # BLD AUTO: 0.33 K/UL — SIGNIFICANT CHANGE UP (ref 0–0.5)
EOSINOPHIL NFR BLD AUTO: 4.2 % — SIGNIFICANT CHANGE UP (ref 0–6)
GLUCOSE SERPL-MCNC: 218 MG/DL — HIGH (ref 70–99)
HCT VFR BLD CALC: 44 % — SIGNIFICANT CHANGE UP (ref 39–50)
HGB BLD-MCNC: 14.7 G/DL — SIGNIFICANT CHANGE UP (ref 13–17)
IMM GRANULOCYTES NFR BLD AUTO: 0.3 % — SIGNIFICANT CHANGE UP (ref 0–1.5)
LYMPHOCYTES # BLD AUTO: 1.24 K/UL — SIGNIFICANT CHANGE UP (ref 1–3.3)
LYMPHOCYTES # BLD AUTO: 15.8 % — SIGNIFICANT CHANGE UP (ref 13–44)
MCHC RBC-ENTMCNC: 29 PG — SIGNIFICANT CHANGE UP (ref 27–34)
MCHC RBC-ENTMCNC: 33.4 GM/DL — SIGNIFICANT CHANGE UP (ref 32–36)
MCV RBC AUTO: 86.8 FL — SIGNIFICANT CHANGE UP (ref 80–100)
METHOD TYPE: SIGNIFICANT CHANGE UP
MONOCYTES # BLD AUTO: 0.6 K/UL — SIGNIFICANT CHANGE UP (ref 0–0.9)
MONOCYTES NFR BLD AUTO: 7.6 % — SIGNIFICANT CHANGE UP (ref 2–14)
NEUTROPHILS # BLD AUTO: 5.61 K/UL — SIGNIFICANT CHANGE UP (ref 1.8–7.4)
NEUTROPHILS NFR BLD AUTO: 71.5 % — SIGNIFICANT CHANGE UP (ref 43–77)
NRBC # BLD: 0 /100 WBCS — SIGNIFICANT CHANGE UP (ref 0–0)
PLATELET # BLD AUTO: 214 K/UL — SIGNIFICANT CHANGE UP (ref 150–400)
POTASSIUM SERPL-MCNC: 4.2 MMOL/L — SIGNIFICANT CHANGE UP (ref 3.5–5.3)
POTASSIUM SERPL-SCNC: 4.2 MMOL/L — SIGNIFICANT CHANGE UP (ref 3.5–5.3)
PROT SERPL-MCNC: 7.4 G/DL — SIGNIFICANT CHANGE UP (ref 6–8.3)
RBC # BLD: 5.07 M/UL — SIGNIFICANT CHANGE UP (ref 4.2–5.8)
RBC # FLD: 13.7 % — SIGNIFICANT CHANGE UP (ref 10.3–14.5)
SODIUM SERPL-SCNC: 139 MMOL/L — SIGNIFICANT CHANGE UP (ref 135–145)
WBC # BLD: 7.85 K/UL — SIGNIFICANT CHANGE UP (ref 3.8–10.5)
WBC # FLD AUTO: 7.85 K/UL — SIGNIFICANT CHANGE UP (ref 3.8–10.5)

## 2021-02-13 PROCEDURE — 99231 SBSQ HOSP IP/OBS SF/LOW 25: CPT

## 2021-02-13 RX ORDER — INSULIN GLARGINE 100 [IU]/ML
40 INJECTION, SOLUTION SUBCUTANEOUS AT BEDTIME
Refills: 0 | Status: DISCONTINUED | OUTPATIENT
Start: 2021-02-13 | End: 2021-02-15

## 2021-02-13 RX ORDER — INSULIN LISPRO 100/ML
13 VIAL (ML) SUBCUTANEOUS
Refills: 0 | Status: DISCONTINUED | OUTPATIENT
Start: 2021-02-13 | End: 2021-02-15

## 2021-02-13 RX ORDER — LANOLIN ALCOHOL/MO/W.PET/CERES
5 CREAM (GRAM) TOPICAL AT BEDTIME
Refills: 0 | Status: DISCONTINUED | OUTPATIENT
Start: 2021-02-13 | End: 2021-02-16

## 2021-02-13 RX ADMIN — LOSARTAN POTASSIUM 25 MILLIGRAM(S): 100 TABLET, FILM COATED ORAL at 06:22

## 2021-02-13 RX ADMIN — CEFTRIAXONE 100 MILLIGRAM(S): 500 INJECTION, POWDER, FOR SOLUTION INTRAMUSCULAR; INTRAVENOUS at 15:30

## 2021-02-13 RX ADMIN — GENTAMICIN SULFATE 1 DROP(S): 3 SOLUTION/ DROPS OPHTHALMIC at 23:11

## 2021-02-13 RX ADMIN — Medication 1 TABLET(S): at 15:30

## 2021-02-13 RX ADMIN — HEPARIN SODIUM 5000 UNIT(S): 5000 INJECTION INTRAVENOUS; SUBCUTANEOUS at 06:23

## 2021-02-13 RX ADMIN — TACROLIMUS 1.5 MILLIGRAM(S): 5 CAPSULE ORAL at 09:27

## 2021-02-13 RX ADMIN — TACROLIMUS 1.5 MILLIGRAM(S): 5 CAPSULE ORAL at 21:37

## 2021-02-13 RX ADMIN — HEPARIN SODIUM 5000 UNIT(S): 5000 INJECTION INTRAVENOUS; SUBCUTANEOUS at 21:35

## 2021-02-13 RX ADMIN — Medication 6: at 12:35

## 2021-02-13 RX ADMIN — Medication 25 MILLIGRAM(S): at 06:24

## 2021-02-13 RX ADMIN — ATORVASTATIN CALCIUM 10 MILLIGRAM(S): 80 TABLET, FILM COATED ORAL at 21:36

## 2021-02-13 RX ADMIN — GABAPENTIN 300 MILLIGRAM(S): 400 CAPSULE ORAL at 16:22

## 2021-02-13 RX ADMIN — Medication 25 MILLIGRAM(S): at 12:27

## 2021-02-13 RX ADMIN — Medication 10 UNIT(S): at 07:40

## 2021-02-13 RX ADMIN — Medication 0.1 MILLIGRAM(S): at 06:22

## 2021-02-13 RX ADMIN — Medication 2: at 16:47

## 2021-02-13 RX ADMIN — AZATHIOPRINE 100 MILLIGRAM(S): 100 TABLET ORAL at 10:09

## 2021-02-13 RX ADMIN — GENTAMICIN SULFATE 1 DROP(S): 3 SOLUTION/ DROPS OPHTHALMIC at 16:22

## 2021-02-13 RX ADMIN — Medication 5 MILLIGRAM(S): at 21:42

## 2021-02-13 RX ADMIN — Medication 0.1 MILLIGRAM(S): at 16:22

## 2021-02-13 RX ADMIN — Medication 10 UNIT(S): at 12:34

## 2021-02-13 RX ADMIN — GABAPENTIN 300 MILLIGRAM(S): 400 CAPSULE ORAL at 06:22

## 2021-02-13 RX ADMIN — HEPARIN SODIUM 5000 UNIT(S): 5000 INJECTION INTRAVENOUS; SUBCUTANEOUS at 12:26

## 2021-02-13 RX ADMIN — Medication 75 MILLIGRAM(S): at 16:22

## 2021-02-13 RX ADMIN — Medication 4: at 07:40

## 2021-02-13 RX ADMIN — Medication 25 MILLIGRAM(S): at 21:37

## 2021-02-13 RX ADMIN — Medication 75 MILLIGRAM(S): at 06:22

## 2021-02-13 RX ADMIN — GENTAMICIN SULFATE 1 DROP(S): 3 SOLUTION/ DROPS OPHTHALMIC at 10:09

## 2021-02-13 RX ADMIN — GENTAMICIN SULFATE 1 DROP(S): 3 SOLUTION/ DROPS OPHTHALMIC at 06:22

## 2021-02-13 RX ADMIN — Medication 13 UNIT(S): at 16:47

## 2021-02-13 RX ADMIN — INSULIN GLARGINE 40 UNIT(S): 100 INJECTION, SOLUTION SUBCUTANEOUS at 21:36

## 2021-02-13 RX ADMIN — FAMOTIDINE 20 MILLIGRAM(S): 10 INJECTION INTRAVENOUS at 10:09

## 2021-02-13 NOTE — PROGRESS NOTE ADULT - ASSESSMENT
Pt is a 55M w/ PMHx of ESR s/p RT, DM, HLD, HTN p/w redness/swelling R leg, seen by podiatry now s/p 3rd ray amputation of R foot    R foot OM/RLE cellulitis  s/p partial amputation of third digit of right foot on 2/11  -pending BCx--NGTD    -pending path with duration recs based on results    Staphylococcus lugdunensis from 2/11 so follow for duration recs   for now  -c/w ceftriaxone 2gm q24h      Renal Txp  -c/w bactrim for PJP ppx

## 2021-02-13 NOTE — PROGRESS NOTE ADULT - SUBJECTIVE AND OBJECTIVE BOX
[facility] DIVISION of INFECTIOUS DISEASE  Marko Saucedo MD PhD, Qiana Steven MD, Claire Castro MD, Tal Russo MD  and providing coverage with Sabrina Hinton MD and Krista Del Valle MD  Providing Infectious Disease Consultations at [facility], [facility]'s    Office# [phone] to schedule follow up appointments  Answering Service for urgent calls or New Consults [phone]  Cell# to text for urgent issues Marko Saucedo [phone]     infectious diseases progress note:    LUTHER NORIEGA is a 55y y. o. Male patient    No concerning overnight events    Allergies    No Known Allergies    Intolerances        ANTIBIOTICS/RELEVANT:  antimicrobials  cefTRIAXone   IVPB 2000 milliGRAM(s) IV Intermittent every 24 hours  trimethoprim   80 mG/sulfamethoxazole 400 mG 1 Tablet(s) Oral daily    immunologic:  azaTHIOprine 100 milliGRAM(s) Oral daily  tacrolimus 1.5 milliGRAM(s) Oral two times a day    OTHER:  atorvastatin 10 milliGRAM(s) Oral at bedtime  cloNIDine 0.1 milliGRAM(s) Oral two times a day  dextrose 40% Gel 15 Gram(s) Oral once  dextrose 5%. 1000 milliLiter(s) IV Continuous <Continuous>  dextrose 5%. 1000 milliLiter(s) IV Continuous <Continuous>  dextrose 50% Injectable 25 Gram(s) IV Push once  dextrose 50% Injectable 25 Gram(s) IV Push once  dextrose 50% Injectable 12.5 Gram(s) IV Push once  famotidine    Tablet 20 milliGRAM(s) Oral daily  gabapentin 300 milliGRAM(s) Oral two times a day  gentamicin 0.3% Ophthalmic Solution 1 Drop(s) Right EYE four times a day  glucagon  Injectable 1 milliGRAM(s) IntraMuscular once  heparin   Injectable 5000 Unit(s) SubCutaneous every 8 hours  hydrALAZINE 25 milliGRAM(s) Oral three times a day  insulin glargine Injectable (LANTUS) 35 Unit(s) SubCutaneous at bedtime  insulin lispro (ADMELOG) corrective regimen sliding scale   SubCutaneous three times a day before meals  insulin lispro (ADMELOG) corrective regimen sliding scale   SubCutaneous at bedtime  insulin lispro Injectable (ADMELOG) 10 Unit(s) SubCutaneous three times a day before meals  losartan 25 milliGRAM(s) Oral daily  metoprolol tartrate 75 milliGRAM(s) Oral two times a day      Objective:  Vital Signs Last 24 Hrs  T(C): 36.7 (13 Feb 2021 04:45), Max: 36.8 (12 Feb 2021 16:30)  T(F): 98 (13 Feb 2021 04:45), Max: 98.2 (12 Feb 2021 16:30)  HR: 61 (13 Feb 2021 04:45) (60 - 63)  BP: 143/70 (13 Feb 2021 04:45) (127/71 - 149/75)  BP(mean): --  RR: 17 (13 Feb 2021 04:45) (16 - 17)  SpO2: 95% (13 Feb 2021 04:45) (91% - 95%)    T(C): 36.7 (02-13-21 @ 04:45), Max: 36.9 (02-11-21 @ 20:46)  T(C): 36.7 (02-13-21 @ 04:45), Max: 37.1 (02-10-21 @ 21:19)  T(C): 36.7 (02-13-21 @ 04:45), Max: 37.1 (02-10-21 @ 21:19)    PHYSICAL EXAM:  HEENT: NC atraumatic  Neck: supple  Respiratory: no accessory muscle use, breathing comfortably  Cardiovascular: distant  Gastrointestinal: normal appearing, nondistended  Extremities: no clubbing, no cyanosis, foot wrapped        LABS:                          14.7   7.85  )-----------( 214      ( 13 Feb 2021 08:01 )             44.0       7.85 02-13 @ 08:01  7.30 02-12 @ 06:45  7.00 02-11 @ 07:11  6.34 02-10 @ 10:11      02-13    139  |  100  |  15  ----------------------------<  218<H>  4.2   |  34<H>  |  1.20    Ca    9.3      13 Feb 2021 08:00    TPro  7.4  /  Alb  3.3  /  TBili  0.6  /  DBili  x   /  AST  12<L>  /  ALT  19  /  AlkPhos  124<H>  02-13      Creatinine, Serum: 1.20 mg/dL (02-13-21 @ 08:00)  Creatinine, Serum: 1.20 mg/dL (02-12-21 @ 06:45)  Creatinine, Serum: 1.10 mg/dL (02-11-21 @ 07:11)  Creatinine, Serum: 1.30 mg/dL (02-10-21 @ 10:11)                INFLAMMATORY MARKERS  Auto Neutrophil #: 5.61 K/uL (02-13-21 @ 08:01)  Auto Lymphocyte #: 1.24 K/uL (02-13-21 @ 08:01)  Auto Neutrophil #: 4.77 K/uL (02-11-21 @ 07:11)  Auto Lymphocyte #: 1.26 K/uL (02-11-21 @ 07:11)  Auto Neutrophil #: 4.71 K/uL (02-10-21 @ 10:11)  Auto Lymphocyte #: 0.83 K/uL (02-10-21 @ 10:11)    Lactate, Blood: 0.9 mmol/L (02-10-21 @ 10:09)    Auto Eosinophil #: 0.33 K/uL (02-13-21 @ 08:01)  Auto Eosinophil #: 0.34 K/uL (02-11-21 @ 07:11)  Auto Eosinophil #: 0.26 K/uL (02-10-21 @ 10:11)        Activated Partial Thromboplastin Time: 30.5 sec (02-10-21 @ 10:11)  INR: 0.96 ratio (02-10-21 @ 10:11)          MICROBIOLOGY:    Culture - Tissue with Gram Stain (02.11.21 @ 01:25)    -  RIF- Rifampin: S <=1 Should not be used as monotherapy    -  Tetra/Doxy: S <=1    -  Trimethoprim/Sulfamethoxazole: S <=0.5/9.5    -  Vancomycin: S 1    Gram Stain:   No polymorphonuclear cells seen per low power field  No organisms seen per oil power field    -  Ampicillin/Sulbactam: S <=8/4    -  Cefazolin: S <=4    -  Clindamycin: S <=0.25    -  Erythromycin: S <=0.25    -  Gentamicin: S <=1 Should not be used as monotherapy    -  Oxacillin: S 0.5    -  Penicillin: R >8    Specimen Source: .Tissue Other, right foot    Culture Results:   Numerous Staphylococcus lugdunensis    Organism Identification: Staphylococcus lugdunensis    Organism: Staphylococcus lugdunensis    Method Type: BONILLA        RADIOLOGY & ADDITIONAL STUDIES:

## 2021-02-13 NOTE — PROGRESS NOTE ADULT - PROBLEM SELECTOR PLAN 3
DM type 1, hyperglycemic on admission, bg in 300s  -patient is on insulin pump 3 units/hr basal rate x 24 hrs, uses humalog: will hold insulin pump for now as patient's site got disconnected when changing his clothes today (on prior admission had kept pump at 70% basal rate ie 2.1 units/hr x 24 hrs)  -mod dose ISS  -continue lantus 35 units at bedtime  -continue 10 units premeal   -patient prefers MDII, hold insulin pump    -A1c 10  -hypoglycemia protocol   -Endocrine Dr. Craig Perlman consulted; recs appreciated

## 2021-02-13 NOTE — PROGRESS NOTE ADULT - SUBJECTIVE AND OBJECTIVE BOX
55y year old Male seen at [facility] 2EAS 201 D1 for right foot 3rd digit osteomyelitis, s/p right foot third digit partial amputation.   Denies any fever, chills, nausea, vomiting, chest pain, shortness of breath, or calf pain at this time.    Allergies    No Known Allergies    Intolerances        MEDICATIONS  (STANDING):  atorvastatin 10 milliGRAM(s) Oral at bedtime  azaTHIOprine 100 milliGRAM(s) Oral daily  cefTRIAXone   IVPB 2000 milliGRAM(s) IV Intermittent every 24 hours  cloNIDine 0.1 milliGRAM(s) Oral two times a day  dextrose 40% Gel 15 Gram(s) Oral once  dextrose 5%. 1000 milliLiter(s) (50 mL/Hr) IV Continuous <Continuous>  dextrose 5%. 1000 milliLiter(s) (100 mL/Hr) IV Continuous <Continuous>  dextrose 50% Injectable 25 Gram(s) IV Push once  dextrose 50% Injectable 25 Gram(s) IV Push once  dextrose 50% Injectable 12.5 Gram(s) IV Push once  famotidine    Tablet 20 milliGRAM(s) Oral daily  gabapentin 300 milliGRAM(s) Oral two times a day  glucagon  Injectable 1 milliGRAM(s) IntraMuscular once  heparin   Injectable 5000 Unit(s) SubCutaneous every 8 hours  hydrALAZINE 25 milliGRAM(s) Oral three times a day  insulin glargine Injectable (LANTUS) 35 Unit(s) SubCutaneous at bedtime  insulin lispro (ADMELOG) corrective regimen sliding scale   SubCutaneous three times a day before meals  insulin lispro (ADMELOG) corrective regimen sliding scale   SubCutaneous at bedtime  insulin lispro Injectable (ADMELOG) 10 Unit(s) SubCutaneous three times a day before meals  losartan 25 milliGRAM(s) Oral daily  metoprolol tartrate 75 milliGRAM(s) Oral two times a day  tacrolimus 1.5 milliGRAM(s) Oral two times a day  trimethoprim   80 mG/sulfamethoxazole 400 mG 1 Tablet(s) Oral daily    MEDICATIONS  (PRN):      Vital Signs Last 24 Hrs  T(C): 36.7 (12 Feb 2021 13:08), Max: 36.9 (11 Feb 2021 20:46)  T(F): 98.1 (12 Feb 2021 13:08), Max: 98.4 (11 Feb 2021 20:46)  HR: 60 (12 Feb 2021 13:08) (60 - 69)  BP: 127/71 (12 Feb 2021 13:08) (127/71 - 169/71)  BP(mean): --  RR: 16 (12 Feb 2021 13:08) (16 - 18)  SpO2: 91% (12 Feb 2021 13:08) (90% - 94%)    PHYSICAL EXAM:  Vascular: DP palpable, PT dopplerable bilaterally, Capillary refill 3 seconds, Digital hair present bilaterally  Neurological: Light touch sensation diminished bilaterally  Musculoskeletal: 5/5 strength in all quadrants bilaterally, AJ & STJ ROM intact  Dermatological:  right foot 3rd digit surgical site is clean with no signs of infection.  Skin edges well coapted with sutures intact.    Digit appears and feels well perfused.      CBC Full  -  ( 12 Feb 2021 06:45 )  WBC Count : 7.30 K/uL  RBC Count : 4.93 M/uL  Hemoglobin : 14.2 g/dL  Hematocrit : 42.8 %  Platelet Count - Automated : 208 K/uL  Mean Cell Volume : 86.8 fl  Mean Cell Hemoglobin : 28.8 pg  Mean Cell Hemoglobin Concentration : 33.2 gm/dL  Auto Neutrophil # : x  Auto Lymphocyte # : x  Auto Monocyte # : x  Auto Eosinophil # : x  Auto Basophil # : x  Auto Neutrophil % : x  Auto Lymphocyte % : x  Auto Monocyte % : x  Auto Eosinophil % : x  Auto Basophil % : x      ----------CHEM PANEL----------          Culture - Tissue with Gram Stain (collected 11 Feb 2021 12:36)  Source: .Tissue Other, 3rd toe right foot head of mid  Gram Stain (11 Feb 2021 22:18):    Rare polymorphonuclear leukocytes    Rare Gram Positive Cocci per oil power field    Rare Gram Variable Rods per oil power field  Preliminary Report (12 Feb 2021 11:02):    No growth    Culture - Tissue with Gram Stain (collected 11 Feb 2021 12:35)  Source: .Tissue Other, 3rd toe right foot distal phal  Gram Stain (11 Feb 2021 22:19):    Rare polymorphonuclear leukocytes    Rare Gram positive cocci in pairs per oil power field    Rare Gram Variable Rods per oil power field  Preliminary Report (12 Feb 2021 11:03):    No growth    Culture - Tissue with Gram Stain (collected 11 Feb 2021 01:25)  Source: .Tissue Other, right foot  Gram Stain (11 Feb 2021 06:43):    No polymorphonuclear cells seen per low power field    No organisms seen per oil power field  Preliminary Report (12 Feb 2021 13:12):    Numerous Staphylococcus lugdunensis    Culture - Blood (collected 10 Feb 2021 16:30)  Source: .Blood Blood-Peripheral  Preliminary Report (11 Feb 2021 17:01):    No growth to date.    Culture - Blood (collected 10 Feb 2021 16:30)  Source: .Blood Blood-Peripheral  Preliminary Report (11 Feb 2021 17:01):    No growth to date.        Imaging: ----------

## 2021-02-13 NOTE — PROGRESS NOTE ADULT - PROBLEM SELECTOR PLAN 1
increase lantus 40 units qhs  increase admelog 13 units 3x/day before meals  cont mod dose admelog scale coverage qac/qhs  cont cons cho diet  goal bg 100-180 in hosp setting

## 2021-02-13 NOTE — PROGRESS NOTE ADULT - SUBJECTIVE AND OBJECTIVE BOX
CAPILLARY BLOOD GLUCOSE      POCT Blood Glucose.: 270 mg/dL (13 Feb 2021 12:33)  POCT Blood Glucose.: 208 mg/dL (13 Feb 2021 07:38)  POCT Blood Glucose.: 247 mg/dL (12 Feb 2021 21:07)  POCT Blood Glucose.: 261 mg/dL (12 Feb 2021 16:29)      Vital Signs Last 24 Hrs  T(C): 36.8 (13 Feb 2021 13:24), Max: 36.8 (12 Feb 2021 16:30)  T(F): 98.3 (13 Feb 2021 13:24), Max: 98.3 (13 Feb 2021 13:24)  HR: 62 (13 Feb 2021 13:24) (61 - 63)  BP: 124/77 (13 Feb 2021 13:24) (124/77 - 149/75)  BP(mean): --  RR: 16 (13 Feb 2021 13:24) (16 - 17)  SpO2: 93% (13 Feb 2021 13:24) (93% - 95%)    General: WN/WD NAD  Respiratory: CTA B/L  CV: RRR, S1S2, no murmurs, rubs or gallops  Abdominal: Soft, NT, ND +BS, Last BM  Extremities: le foot dsg intact     02-13    139  |  100  |  15  ----------------------------<  218<H>  4.2   |  34<H>  |  1.20    Ca    9.3      13 Feb 2021 08:00    TPro  7.4  /  Alb  3.3  /  TBili  0.6  /  DBili  x   /  AST  12<L>  /  ALT  19  /  AlkPhos  124<H>  02-13      atorvastatin 10 milliGRAM(s) Oral at bedtime  dextrose 40% Gel 15 Gram(s) Oral once  dextrose 50% Injectable 25 Gram(s) IV Push once  dextrose 50% Injectable 25 Gram(s) IV Push once  dextrose 50% Injectable 12.5 Gram(s) IV Push once  glucagon  Injectable 1 milliGRAM(s) IntraMuscular once  insulin glargine Injectable (LANTUS) 35 Unit(s) SubCutaneous at bedtime  insulin lispro (ADMELOG) corrective regimen sliding scale   SubCutaneous three times a day before meals  insulin lispro (ADMELOG) corrective regimen sliding scale   SubCutaneous at bedtime  insulin lispro Injectable (ADMELOG) 10 Unit(s) SubCutaneous three times a day before meals

## 2021-02-13 NOTE — PROGRESS NOTE ADULT - SUBJECTIVE AND OBJECTIVE BOX
Patient is a 55y old  Male who presents with a chief complaint of osteomyelitis (2021 14:24)     54 YO M with hx CKD s/p renal transplant, CVA, DM, neuropathy, DVT, HLD, Hypertension ,Obesity, squamous cell carcinoma, presents to ED for redness/swelling R leg. Patient states 3 days ago, he first noticed his toe nail on the R 3rd digit starting to "lift up" from a "small pimple" underneath. States there was also clear/red oozing. Patient made an appointment with Dr. Araiza for Friday of this week. Yesterday patient developed redness and swelling in the R lower leg and had blood sugars in the 300s which prompted him to come to the ED. Patient has peripheral neuropathy and states he has no pain in his legs. Denies any fever, chills, SOB, CP, N/V.     Of note, patient was admitted to [facility] last August for osteomyelitis and had left 3rd toe distal digit partial amputation and right foot distal digit 2nd digit on 20.       In the ED:   VS: T 98F, HR 76, /75, RR 16, SpO2 96% on RA  Labs: glucose 361, alk phos 147, UA neg, covid-19 neg  US duplex venous LE: neg for DVT   EKG: Sinus bradycardia with 1st degree AV block (unchanged from 2020)  XR R foot: cortical erosions of right 3rd distal phalanx  CXR: pending official read     S/p Vancomycin 1g IV, Rocephin 2g IV, 1L NS IV bolus     INTERVAL HPI:    2 - Pt seen and examined at bedside, s/p partial amputation of third digit of right foot. Patient states he feels well, no complaints. Erythema and swelling on RLE are improving on rocephin.     2 - Pt seen and examined at bedside. POD#1, remains afebrile, VSS. Patient offers no complaints. Improved blood glucose today. Continue rocephin for RLE cellulitis. Pending OR tissue cx, path. On IV Antibiotics.    2 - Pt seen and examined at bedside. POD#2, no events overnight. Tissue cx growing Staphylococcus lugdunensis. Continue Rocephin 2g IV     OVERNIGHT EVENTS: none     Home Medications:  aspirin 325 mg oral tablet: 1 tab(s) orally once a day (10 Feb 2021 15:40)  azaTHIOprine 100 mg oral tablet: 1 tab(s) orally once a day (10 Feb 2021 15:40)  cloNIDine 0.1 mg oral tablet: 1 tab(s) orally 2 times a day (10 Feb 2021 15:40)  famotidine 20 mg oral tablet: 1 tab(s) orally once a day (10 Feb 2021 15:40)  gabapentin 300 mg oral capsule: 1 cap(s) orally 2 times a day (10 Feb 2021 15:40)  HumaLOG 100 units/mL injectable solution: 3 unit(s) injectable every hour via insulin pump. TDD: 72 units. (10 Feb 2021 15:40)  hydrALAZINE 25 mg oral tablet: 1 tab(s) orally 3 times a day (10 Feb 2021 15:40)  losartan 25 mg oral tablet: 1 tab(s) orally once a day (10 Feb 2021 15:40)  lovastatin 40 mg oral tablet: 1 tab(s) orally once a day (10 Feb 2021 15:40)  Metoprolol Tartrate 25 mg oral tablet: 3 tab(s) orally 2 times a day    *conf. with patient and pharmacy. 75mg dose, BID* (10 Feb 2021 15:40)  sulfamethoxazole-trimethoprim 400 mg-80 mg oral tablet: 1 tab(s) orally once a day (10 Feb 2021 15:40)  tacrolimus: 1.5 milligram(s) orally 2 times a day (10 Feb 2021 15:40)      MEDICATIONS  (STANDING):  atorvastatin 10 milliGRAM(s) Oral at bedtime  azaTHIOprine 100 milliGRAM(s) Oral daily  cefTRIAXone   IVPB 2000 milliGRAM(s) IV Intermittent every 24 hours  cloNIDine 0.1 milliGRAM(s) Oral two times a day  dextrose 40% Gel 15 Gram(s) Oral once  dextrose 5%. 1000 milliLiter(s) (50 mL/Hr) IV Continuous <Continuous>  dextrose 5%. 1000 milliLiter(s) (100 mL/Hr) IV Continuous <Continuous>  dextrose 50% Injectable 25 Gram(s) IV Push once  dextrose 50% Injectable 25 Gram(s) IV Push once  dextrose 50% Injectable 12.5 Gram(s) IV Push once  famotidine    Tablet 20 milliGRAM(s) Oral daily  gabapentin 300 milliGRAM(s) Oral two times a day  glucagon  Injectable 1 milliGRAM(s) IntraMuscular once  hydrALAZINE 25 milliGRAM(s) Oral three times a day  insulin glargine Injectable (LANTUS) 35 Unit(s) SubCutaneous at bedtime  insulin lispro (ADMELOG) corrective regimen sliding scale   SubCutaneous three times a day before meals  insulin lispro (ADMELOG) corrective regimen sliding scale   SubCutaneous at bedtime  insulin lispro Injectable (ADMELOG) 10 Unit(s) SubCutaneous three times a day before meals  losartan 25 milliGRAM(s) Oral daily  metoprolol tartrate 75 milliGRAM(s) Oral two times a day  tacrolimus 1.5 milliGRAM(s) Oral two times a day  trimethoprim   80 mG/sulfamethoxazole 400 mG 1 Tablet(s) Oral daily    MEDICATIONS  (PRN):      No Known Allergies      Social History:  , lives with wife and 3 kids, retired, social drinker, denies tobacco or recreational drug use, independent in ADLs, walks without assistance (10 Feb 2021 15:11)      REVIEW OF SYSTEMS: i am OK  CONSTITUTIONAL: No fever, No chills, No fatigue, No myalgia, No Body ache, No Weakness  EYES: No eye pain,  No visual disturbances, No discharge, No Redness  ENMT: No ear pain, No nose bleed, No vertigo; No sinus pain, No throat pain, No Congestion  NECK: No pain, No stiffness  RESPIRATORY: No cough, No wheezing, No hemoptysis, No shortness of breath  CARDIOVASCULAR: No chest pain, No palpitations  GASTROINTESTINAL: No abdominal pain, No epigastric pain. No nausea, No vomiting, No diarrhea, No constipation; [x  ] BM  GENITOURINARY: No dysuria, No frequency, No urgency, No hematuria, No incontinence  NEUROLOGICAL: No headaches, No dizziness, No numbness, No tingling, No tremors, No weakness  EXTREMITIES: No Swelling, No Pain, No Edema  SKIN: [x  ] No itching, burning, rashes, or lesions   MUSCULOSKELETAL: No joint pain, No joint swelling; No muscle pain, No back pain, No extremity pain  PSYCHIATRIC: No depression, No anxiety, No mood swings, No difficulty sleeping at night  PAIN SCALE: [ x ] None  [  ] Other-  ROS Unable to obtain due to: [  ] Dementia  [  ] Lethargy  [  ] Sedated  [  ] Non verbal  REST OF REVIEW OF SYSTEMS: [ x ] Normal     Vital Signs Last 24 Hrs  T(C): 36.7 (2021 04:45), Max: 36.8 (2021 16:30)  T(F): 98 (2021 04:45), Max: 98.2 (2021 16:30)  HR: 61 (2021 04:45) (60 - 63)  BP: 143/70 (2021 04:45) (127/71 - 149/75)  BP(mean): --  RR: 17 (2021 04:45) (16 - 17)  SpO2: 95% (2021 04:45) (91% - 95%)    Finger Stick      I&O's Summary    PHYSICAL EXAM:  GENERAL:  [ x ] NAD, [x  ] Well appearing, [  ] Agitated, [  ] Drowsy, [  ] Lethargy, [  ] Confused   HEAD:  [x  ] Normal, [  ] Other  EYES:  [ x ] EOMI, [ x ] PERRLA, [x  ] Conjunctiva and sclera clear normal, [  ] Other, [  ] Pallor, [  ] Discharge  ENMT:  [ x ] Normal, [x  ] Moist mucous membranes, [x  ] Good dentition, [x  ] No thrush  NECK:  [x  ] Supple, [x  ] No JVD, [x  ] Normal thyroid, [  ] Lymphadenopathy, [  ] Other  CHEST/LUNG:  [ x ] Clear to auscultation bilaterally, [x  ] Breath Sounds equal B/L  [  ] Poor effort, [x  ] No rales, [x  ] No rhonchi, [ x ] No wheezing  HEART:  [x  ] Regular rate and rhythm, [  ] Tachycardia, [  ] Bradycardia, [  ] Irregular, [ x ] No murmurs, No rubs, No gallops, [  ] PPM in place (Mfr:  )  ABDOMEN:  [x  ] Soft, [ x ] Nontender, [x  ] Nondistended, [ x ] No mass, [ x ] Bowel sounds present, [  ] Obese  NERVOUS SYSTEM:  [x  ] Alert & Oriented x3, [ x ] Nonfocal, [  ] Confusion, [  ] Encephalopathic, [  ] Sedated, [  ] Unable to assess, [  ] Dementia, [  ] Other-  EXTREMITIES:  [ x ] 2+ Peripheral Pulses, No clubbing, No cyanosis,  [ x ] Edema Rt  lower EXT, with improving erythema, swelling warmth  [x  ] PVD stasis skin changes B/L lower EXT, [x  ] Wound dressing Rt Foot, pt has had amputations left foot toes   LYMPH:  No lymphadenopathy noted  SKIN:  [  ] No rashes or lesions, [  ] Pressure ulcers, [  ] Ecchymosis, [  ] Skin tears, [ x ] erythema and edema RLE from ankle to mid shin, improving     DIET: Diet, DASH/TLC:   Sodium & Cholesterol Restricted  Consistent Carbohydrate Evening Snack (21 @ 09:19)    LABS:  cret                        14.7   7.85  )-----------( 214      ( 2021 08:01 )             44.0         139  |  100  |  15  ----------------------------<  218<H>  4.2   |  34<H>  |  1.20    Ca    9.3      2021 08:00    TPro  7.4  /  Alb  3.3  /  TBili  0.6  /  DBili  x   /  AST  12<L>  /  ALT  19  /  AlkPhos  124<H>  0213    Urinalysis Basic - ( 10 Feb 2021 12:02 )    Color: Pale Yellow / Appearance: Slightly Turbid / S.010 / pH: x  Gluc: x / Ketone: Negative  / Bili: Negative / Urobili: Negative   Blood: x / Protein: 30 mg/dL / Nitrite: Negative   Leuk Esterase: Negative / RBC: 6-10 /HPF / WBC x   Sq Epi: x / Non Sq Epi: Occasional / Bacteria: x        culture blood  -- .Tissue Other, right foot  @ 01:25    culture urine  --   @ 01:25      Culture - Tissue with Gram Stain (collected 2021 01:25)  Source: .Tissue Other, right foot  Gram Stain (2021 06:43):    No polymorphonuclear cells seen per low power field    No organisms seen per oil power field    RADIOLOGY & ADDITIONAL TESTS: NONE      HEALTH ISSUES - PROBLEM Dx:  HLD (hyperlipidemia)  HLD (hyperlipidemia)    Neuropathy  Neuropathy    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    HTN (hypertension)  HTN (hypertension)    Need for prophylactic measure  Need for prophylactic measure    Renal transplant recipient  Renal transplant recipient    CKD (chronic kidney disease)  CKD (chronic kidney disease)    CVA (cerebral vascular accident)  CVA (cerebral vascular accident)    Diabetes mellitus  Diabetes mellitus    Cellulitis  Cellulitis    Osteomyelitis of right foot, unspecified type  Osteomyelitis of right foot, unspecified type    Consultant(s) Notes Reviewed:  [ x ] YES     Care Discussed with [ x ] Consultants, [ x ] Patient, [  ] Family, [  ] HCP, [  ] , [  ] Social Service, [x  ] RN, [  ] Physical Therapy, [  ] Palliative Care Team  DVT PPX: [  ] Lovenox, [  ] SC Heparin, [  ] Coumadin, [  ] Xarelto, [  ] Eliquis, [  ] Pradaxa, [  ] IV Heparin drip, [ x ] SCD, [  ] Ambulation, [  ] Contraindicated 2/2 GI Bleed, [  ] Contraindicated 2/2  Bleed, [  ] Contraindicated 2/2 Brain Bleed  Advanced Directive: [ x ] None, [  ] DNR/DNI Patient is a 55y old  Male who presents with a chief complaint of osteomyelitis (2021 14:24)     54 YO M with hx CKD s/p renal transplant, CVA, DM, neuropathy, DVT, HLD, Hypertension ,Obesity, squamous cell carcinoma, presents to ED for redness/swelling R leg. Patient states 3 days ago, he first noticed his toe nail on the R 3rd digit starting to "lift up" from a "small pimple" underneath. States there was also clear/red oozing. Patient made an appointment with Dr. Araiza for Friday of this week. Yesterday patient developed redness and swelling in the R lower leg and had blood sugars in the 300s which prompted him to come to the ED. Patient has peripheral neuropathy and states he has no pain in his legs. Denies any fever, chills, SOB, CP, N/V.     Of note, patient was admitted to [facility] last August for osteomyelitis and had left 3rd toe distal digit partial amputation and right foot distal digit 2nd digit on 20.       In the ED:   VS: T 98F, HR 76, /75, RR 16, SpO2 96% on RA  Labs: glucose 361, alk phos 147, UA neg, covid-19 neg  US duplex venous LE: neg for DVT   EKG: Sinus bradycardia with 1st degree AV block (unchanged from 2020)  XR R foot: cortical erosions of right 3rd distal phalanx  CXR: pending official read     S/p Vancomycin 1g IV, Rocephin 2g IV, 1L NS IV bolus     INTERVAL HPI:    2 - Pt seen and examined at bedside, s/p partial amputation of third digit of right foot. Patient states he feels well, no complaints. Erythema and swelling on RLE are improving on rocephin.     2 - Pt seen and examined at bedside. POD#1, remains afebrile, VSS. Patient offers no complaints. Improved blood glucose today. Continue rocephin for RLE cellulitis. Pending OR tissue cx, path. On IV Antibiotics.    2 - Pt seen and examined at bedside. POD#2, no events overnight. Tissue cx growing Staphylococcus lugdunensis. Continue Rocephin 2g IV     OVERNIGHT EVENTS: none     Home Medications:  aspirin 325 mg oral tablet: 1 tab(s) orally once a day (10 Feb 2021 15:40)  azaTHIOprine 100 mg oral tablet: 1 tab(s) orally once a day (10 Feb 2021 15:40)  cloNIDine 0.1 mg oral tablet: 1 tab(s) orally 2 times a day (10 Feb 2021 15:40)  famotidine 20 mg oral tablet: 1 tab(s) orally once a day (10 Feb 2021 15:40)  gabapentin 300 mg oral capsule: 1 cap(s) orally 2 times a day (10 Feb 2021 15:40)  HumaLOG 100 units/mL injectable solution: 3 unit(s) injectable every hour via insulin pump. TDD: 72 units. (10 Feb 2021 15:40)  hydrALAZINE 25 mg oral tablet: 1 tab(s) orally 3 times a day (10 Feb 2021 15:40)  losartan 25 mg oral tablet: 1 tab(s) orally once a day (10 Feb 2021 15:40)  lovastatin 40 mg oral tablet: 1 tab(s) orally once a day (10 Feb 2021 15:40)  Metoprolol Tartrate 25 mg oral tablet: 3 tab(s) orally 2 times a day    *conf. with patient and pharmacy. 75mg dose, BID* (10 Feb 2021 15:40)  sulfamethoxazole-trimethoprim 400 mg-80 mg oral tablet: 1 tab(s) orally once a day (10 Feb 2021 15:40)  tacrolimus: 1.5 milligram(s) orally 2 times a day (10 Feb 2021 15:40)      MEDICATIONS  (STANDING):  atorvastatin 10 milliGRAM(s) Oral at bedtime  azaTHIOprine 100 milliGRAM(s) Oral daily  cefTRIAXone   IVPB 2000 milliGRAM(s) IV Intermittent every 24 hours  cloNIDine 0.1 milliGRAM(s) Oral two times a day  dextrose 40% Gel 15 Gram(s) Oral once  dextrose 5%. 1000 milliLiter(s) (50 mL/Hr) IV Continuous <Continuous>  dextrose 5%. 1000 milliLiter(s) (100 mL/Hr) IV Continuous <Continuous>  dextrose 50% Injectable 25 Gram(s) IV Push once  dextrose 50% Injectable 25 Gram(s) IV Push once  dextrose 50% Injectable 12.5 Gram(s) IV Push once  famotidine    Tablet 20 milliGRAM(s) Oral daily  gabapentin 300 milliGRAM(s) Oral two times a day  glucagon  Injectable 1 milliGRAM(s) IntraMuscular once  hydrALAZINE 25 milliGRAM(s) Oral three times a day  insulin glargine Injectable (LANTUS) 35 Unit(s) SubCutaneous at bedtime  insulin lispro (ADMELOG) corrective regimen sliding scale   SubCutaneous three times a day before meals  insulin lispro (ADMELOG) corrective regimen sliding scale   SubCutaneous at bedtime  insulin lispro Injectable (ADMELOG) 10 Unit(s) SubCutaneous three times a day before meals  losartan 25 milliGRAM(s) Oral daily  metoprolol tartrate 75 milliGRAM(s) Oral two times a day  tacrolimus 1.5 milliGRAM(s) Oral two times a day  trimethoprim   80 mG/sulfamethoxazole 400 mG 1 Tablet(s) Oral daily    MEDICATIONS  (PRN):      No Known Allergies      Social History:  , lives with wife and 3 kids, retired, social drinker, denies tobacco or recreational drug use, independent in ADLs, walks without assistance (10 Feb 2021 15:11)      REVIEW OF SYSTEMS: i am OK  CONSTITUTIONAL: No fever, No chills, No fatigue, No myalgia, No Body ache, No Weakness  EYES: No eye pain,  No visual disturbances, No discharge, No Redness  ENMT: No ear pain, No nose bleed, No vertigo; No sinus pain, No throat pain, No Congestion  NECK: No pain, No stiffness  RESPIRATORY: No cough, No wheezing, No hemoptysis, No shortness of breath  CARDIOVASCULAR: No chest pain, No palpitations  GASTROINTESTINAL: No abdominal pain, No epigastric pain. No nausea, No vomiting, No diarrhea, No constipation; [x  ] BM  GENITOURINARY: No dysuria, No frequency, No urgency, No hematuria, No incontinence  NEUROLOGICAL: No headaches, No dizziness, No numbness, No tingling, No tremors, No weakness  EXTREMITIES: No Swelling, No Pain, No Edema  SKIN: [x  ] No itching, burning, rashes, or lesions   MUSCULOSKELETAL: No joint pain, No joint swelling; No muscle pain, No back pain, No extremity pain  PSYCHIATRIC: No depression, No anxiety, No mood swings, No difficulty sleeping at night  PAIN SCALE: [ x ] None  [  ] Other-  ROS Unable to obtain due to: [  ] Dementia  [  ] Lethargy  [  ] Sedated  [  ] Non verbal  REST OF REVIEW OF SYSTEMS: [ x ] Normal     Vital Signs Last 24 Hrs  T(C): 36.7 (2021 04:45), Max: 36.8 (2021 16:30)  T(F): 98 (2021 04:45), Max: 98.2 (2021 16:30)  HR: 61 (2021 04:45) (60 - 63)  BP: 143/70 (2021 04:45) (127/71 - 149/75)  BP(mean): --  RR: 17 (2021 04:45) (16 - 17)  SpO2: 95% (2021 04:45) (91% - 95%)    Finger Stick      I&O's Summary    PHYSICAL EXAM:  GENERAL:  [ x ] NAD, [x  ] Well appearing, [  ] Agitated, [  ] Drowsy, [  ] Lethargy, [  ] Confused   HEAD:  [x  ] Normal, [  ] Other  EYES:  [ x ] EOMI, [ x ] PERRLA, [x  ] Conjunctiva and sclera clear normal, [  ] Other, [  ] Pallor, [  ] Discharge  ENMT:  [ x ] Normal, [x  ] Moist mucous membranes, [x  ] Good dentition, [x  ] No thrush  NECK:  [x  ] Supple, [x  ] No JVD, [x  ] Normal thyroid, [  ] Lymphadenopathy, [  ] Other  CHEST/LUNG:  [ x ] Clear to auscultation bilaterally, [x  ] Breath Sounds equal B/L  [  ] Poor effort, [x  ] No rales, [x  ] No rhonchi, [ x ] No wheezing  HEART:  [x  ] Regular rate and rhythm, [  ] Tachycardia, [  ] Bradycardia, [  ] Irregular, [ x ] No murmurs, No rubs, No gallops, [  ] PPM in place (Mfr:  )  ABDOMEN:  [x  ] Soft, [ x ] Nontender, [x  ] Nondistended, [ x ] No mass, [ x ] Bowel sounds present, [  ] Obese  NERVOUS SYSTEM:  [x  ] Alert & Oriented x3, [ x ] Nonfocal, [  ] Confusion, [  ] Encephalopathic, [  ] Sedated, [  ] Unable to assess, [  ] Dementia, [  ] Other-  EXTREMITIES:  [ x ] 2+ Peripheral Pulses, No clubbing, No cyanosis,  [ x ] Edema Rt  lower EXT, with improving erythema, swelling &  warmth  [x ] PVD stasis skin changes B/L lower EXT, [x  ] Wound dressing Rt Foot, pt has had amputations left foot toes   LYMPH:  No lymphadenopathy noted  SKIN:  [  ] No rashes or lesions, [  ] Pressure ulcers, [  ] Ecchymosis, [  ] Skin tears, [ x ] erythema and edema RLE from ankle to mid shin, improving     DIET: Diet, DASH/TLC:   Sodium & Cholesterol Restricted  Consistent Carbohydrate Evening Snack (21 @ 09:19)    LABS:  cret                        14.7   7.85  )-----------( 214      ( 2021 08:01 )             44.0         139  |  100  |  15  ----------------------------<  218<H>  4.2   |  34<H>  |  1.20    Ca    9.3      2021 08:00    TPro  7.4  /  Alb  3.3  /  TBili  0.6  /  DBili  x   /  AST  12<L>  /  ALT  19  /  AlkPhos  124<H>  0213    Urinalysis Basic - ( 10 Feb 2021 12:02 )    Color: Pale Yellow / Appearance: Slightly Turbid / S.010 / pH: x  Gluc: x / Ketone: Negative  / Bili: Negative / Urobili: Negative   Blood: x / Protein: 30 mg/dL / Nitrite: Negative   Leuk Esterase: Negative / RBC: 6-10 /HPF / WBC x   Sq Epi: x / Non Sq Epi: Occasional / Bacteria: x        culture blood  -- .Tissue Other, right foot  @ 01:25    culture urine  --   @ 01:25      Culture - Tissue with Gram Stain (collected 2021 01:25)  Source: .Tissue Other, right foot  Gram Stain (2021 06:43):    No polymorphonuclear cells seen per low power field    No organisms seen per oil power field    RADIOLOGY & ADDITIONAL TESTS: NONE      HEALTH ISSUES - PROBLEM Dx:  HLD (hyperlipidemia)  HLD (hyperlipidemia)    Neuropathy  Neuropathy    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    HTN (hypertension)  HTN (hypertension)    Need for prophylactic measure  Need for prophylactic measure    Renal transplant recipient  Renal transplant recipient    CKD (chronic kidney disease)  CKD (chronic kidney disease)    CVA (cerebral vascular accident)  CVA (cerebral vascular accident)    Diabetes mellitus  Diabetes mellitus    Cellulitis  Cellulitis    Osteomyelitis of right foot, unspecified type  Osteomyelitis of right foot, unspecified type    Consultant(s) Notes Reviewed:  [ x ] YES     Care Discussed with [ x ] Consultants, [ x ] Patient, [  ] Family, [  ] HCP, [  ] , [  ] Social Service, [x  ] RN, [  ] Physical Therapy, [  ] Palliative Care Team  DVT PPX: [  ] Lovenox, [  ] SC Heparin, [  ] Coumadin, [  ] Xarelto, [  ] Eliquis, [  ] Pradaxa, [  ] IV Heparin drip, [ x ] SCD, [  ] Ambulation, [  ] Contraindicated 2/2 GI Bleed, [  ] Contraindicated 2/2  Bleed, [  ] Contraindicated 2/2 Brain Bleed  Advanced Directive: [ x ] None, [  ] DNR/DNI

## 2021-02-13 NOTE — PROGRESS NOTE ADULT - ASSESSMENT
54 YO M with hx CKD s/p renal transplant, CVA, DM, neuropathy, DVT, HLD, Hypertension ,Obesity, squamous cell carcinoma, presents to ED for redness/swelling R leg. Patient states 3 days ago, he first noticed his toe nail on the R 3rd digit starting to "lift up" from a "small pimple" underneath. States there was also clear/red oozing. Patient made an appointment with Dr. Araiza for Friday of this week. Yesterday patient developed redness and swelling in the R lower leg and had blood sugars in the 300s which prompted him to come to the ED. Patient has peripheral neuropathy and states he has no pain in his legs. Denies any fever, chills, SOB, CP, N/V.     patient was admitted to [facility] last August for osteomyelitis and had left 3rd toe distal digit partial amputation and right foot distal digit 2nd digit on 8/27/20.     MEDICATIONS  (STANDING):  atorvastatin 10 milliGRAM(s) Oral at bedtime  azaTHIOprine 100 milliGRAM(s) Oral daily  cefTRIAXone   IVPB 2000 milliGRAM(s) IV Intermittent every 24 hours  cloNIDine 0.1 milliGRAM(s) Oral two times a day  dextrose 40% Gel 15 Gram(s) Oral once  dextrose 5%. 1000 milliLiter(s) (50 mL/Hr) IV Continuous <Continuous>  dextrose 5%. 1000 milliLiter(s) (100 mL/Hr) IV Continuous <Continuous>  dextrose 50% Injectable 25 Gram(s) IV Push once  dextrose 50% Injectable 12.5 Gram(s) IV Push once  dextrose 50% Injectable 25 Gram(s) IV Push once  famotidine    Tablet 20 milliGRAM(s) Oral daily  gabapentin 300 milliGRAM(s) Oral two times a day  glucagon  Injectable 1 milliGRAM(s) IntraMuscular once  heparin   Injectable 5000 Unit(s) IV Push once  hydrALAZINE 25 milliGRAM(s) Oral three times a day  insulin glargine Injectable (LANTUS) 25 Unit(s) SubCutaneous at bedtime  insulin lispro (ADMELOG) corrective regimen sliding scale   SubCutaneous three times a day before meals  insulin lispro (ADMELOG) corrective regimen sliding scale   SubCutaneous at bedtime  insulin lispro Injectable (ADMELOG) 8 Unit(s) SubCutaneous three times a day before meals  losartan 25 milliGRAM(s) Oral daily  metoprolol tartrate Oral Tab/Cap - Peds 75 milliGRAM(s) Oral two times a day  tacrolimus 1.5 milliGRAM(s) Oral two times a day  trimethoprim   80 mG/sulfamethoxazole 400 mG 1 Tablet(s) Oral daily

## 2021-02-13 NOTE — PROGRESS NOTE ADULT - PROBLEM SELECTOR PLAN 1
Pt assessed  Surgical site cleansed with NS, applied aquacel DSD overlying  Pending cx/path  Pt is WBAT with surgical shoe only  Podiatry will continue to follow pt while in house    WOUND CARE INSTRUCTIONS  1) Keep dressing clean, dry, intact until follow up.    2) Monitor foot for signs of infection including redness, swelling, drainage, foul odor.   If any are noted, please come to ED immediately.   3) Please follow up with Dr. Araiza at the [facility] within 3-5 days of discharge.

## 2021-02-13 NOTE — PROGRESS NOTE ADULT - PROBLEM SELECTOR PLAN 1
- Rt foot  3rd distal phalanx  open wound  probe to bone  c/w with OM , Abnormal   - afebrile, no leukocytosis   -XR R Foot: cortical erosions of right 3rd distal phalanx, however, no discrete om   -s/p right 3rd distal digit amputation with Dr. Araiza, POD#2  - Tissue cx growing Staphylococcus lugdunensis  -continue Rocephin 2g IV daily as per ID     -f/u BCx    -WBAT with surgical shoe  -PT eval; no skilled PT needed

## 2021-02-14 LAB
-  AMPICILLIN/SULBACTAM: SIGNIFICANT CHANGE UP
-  CEFAZOLIN: SIGNIFICANT CHANGE UP
-  CLINDAMYCIN: SIGNIFICANT CHANGE UP
-  ERYTHROMYCIN: SIGNIFICANT CHANGE UP
-  GENTAMICIN: SIGNIFICANT CHANGE UP
-  OXACILLIN: SIGNIFICANT CHANGE UP
-  PENICILLIN: SIGNIFICANT CHANGE UP
-  RIFAMPIN: SIGNIFICANT CHANGE UP
-  TETRACYCLINE: SIGNIFICANT CHANGE UP
-  TRIMETHOPRIM/SULFAMETHOXAZOLE: SIGNIFICANT CHANGE UP
-  VANCOMYCIN: SIGNIFICANT CHANGE UP
ALBUMIN SERPL ELPH-MCNC: 3.3 G/DL — SIGNIFICANT CHANGE UP (ref 3.3–5)
ALP SERPL-CCNC: 121 U/L — HIGH (ref 40–120)
ALT FLD-CCNC: 17 U/L — SIGNIFICANT CHANGE UP (ref 12–78)
ANION GAP SERPL CALC-SCNC: 6 MMOL/L — SIGNIFICANT CHANGE UP (ref 5–17)
AST SERPL-CCNC: 12 U/L — LOW (ref 15–37)
BASOPHILS # BLD AUTO: 0.06 K/UL — SIGNIFICANT CHANGE UP (ref 0–0.2)
BASOPHILS NFR BLD AUTO: 0.7 % — SIGNIFICANT CHANGE UP (ref 0–2)
BILIRUB SERPL-MCNC: 0.7 MG/DL — SIGNIFICANT CHANGE UP (ref 0.2–1.2)
BUN SERPL-MCNC: 18 MG/DL — SIGNIFICANT CHANGE UP (ref 7–23)
CALCIUM SERPL-MCNC: 9.4 MG/DL — SIGNIFICANT CHANGE UP (ref 8.5–10.1)
CHLORIDE SERPL-SCNC: 101 MMOL/L — SIGNIFICANT CHANGE UP (ref 96–108)
CO2 SERPL-SCNC: 31 MMOL/L — SIGNIFICANT CHANGE UP (ref 22–31)
CREAT SERPL-MCNC: 1.2 MG/DL — SIGNIFICANT CHANGE UP (ref 0.5–1.3)
EOSINOPHIL # BLD AUTO: 0.35 K/UL — SIGNIFICANT CHANGE UP (ref 0–0.5)
EOSINOPHIL NFR BLD AUTO: 4.1 % — SIGNIFICANT CHANGE UP (ref 0–6)
GLUCOSE SERPL-MCNC: 197 MG/DL — HIGH (ref 70–99)
HCT VFR BLD CALC: 43.8 % — SIGNIFICANT CHANGE UP (ref 39–50)
HGB BLD-MCNC: 14.5 G/DL — SIGNIFICANT CHANGE UP (ref 13–17)
IMM GRANULOCYTES NFR BLD AUTO: 0.4 % — SIGNIFICANT CHANGE UP (ref 0–1.5)
LYMPHOCYTES # BLD AUTO: 1.47 K/UL — SIGNIFICANT CHANGE UP (ref 1–3.3)
LYMPHOCYTES # BLD AUTO: 17.2 % — SIGNIFICANT CHANGE UP (ref 13–44)
MCHC RBC-ENTMCNC: 28.8 PG — SIGNIFICANT CHANGE UP (ref 27–34)
MCHC RBC-ENTMCNC: 33.1 GM/DL — SIGNIFICANT CHANGE UP (ref 32–36)
MCV RBC AUTO: 87.1 FL — SIGNIFICANT CHANGE UP (ref 80–100)
METHOD TYPE: SIGNIFICANT CHANGE UP
MONOCYTES # BLD AUTO: 0.61 K/UL — SIGNIFICANT CHANGE UP (ref 0–0.9)
MONOCYTES NFR BLD AUTO: 7.2 % — SIGNIFICANT CHANGE UP (ref 2–14)
NEUTROPHILS # BLD AUTO: 6.01 K/UL — SIGNIFICANT CHANGE UP (ref 1.8–7.4)
NEUTROPHILS NFR BLD AUTO: 70.4 % — SIGNIFICANT CHANGE UP (ref 43–77)
NRBC # BLD: 0 /100 WBCS — SIGNIFICANT CHANGE UP (ref 0–0)
PLATELET # BLD AUTO: 221 K/UL — SIGNIFICANT CHANGE UP (ref 150–400)
POTASSIUM SERPL-MCNC: 4.1 MMOL/L — SIGNIFICANT CHANGE UP (ref 3.5–5.3)
POTASSIUM SERPL-SCNC: 4.1 MMOL/L — SIGNIFICANT CHANGE UP (ref 3.5–5.3)
PROT SERPL-MCNC: 7.5 G/DL — SIGNIFICANT CHANGE UP (ref 6–8.3)
RBC # BLD: 5.03 M/UL — SIGNIFICANT CHANGE UP (ref 4.2–5.8)
RBC # FLD: 13.7 % — SIGNIFICANT CHANGE UP (ref 10.3–14.5)
SARS-COV-2 RNA SPEC QL NAA+PROBE: SIGNIFICANT CHANGE UP
SODIUM SERPL-SCNC: 138 MMOL/L — SIGNIFICANT CHANGE UP (ref 135–145)
WBC # BLD: 8.53 K/UL — SIGNIFICANT CHANGE UP (ref 3.8–10.5)
WBC # FLD AUTO: 8.53 K/UL — SIGNIFICANT CHANGE UP (ref 3.8–10.5)

## 2021-02-14 RX ADMIN — Medication 0.1 MILLIGRAM(S): at 05:11

## 2021-02-14 RX ADMIN — Medication 75 MILLIGRAM(S): at 05:11

## 2021-02-14 RX ADMIN — GABAPENTIN 300 MILLIGRAM(S): 400 CAPSULE ORAL at 05:11

## 2021-02-14 RX ADMIN — Medication 4: at 07:36

## 2021-02-14 RX ADMIN — TACROLIMUS 1.5 MILLIGRAM(S): 5 CAPSULE ORAL at 09:41

## 2021-02-14 RX ADMIN — Medication 13 UNIT(S): at 07:37

## 2021-02-14 RX ADMIN — TACROLIMUS 1.5 MILLIGRAM(S): 5 CAPSULE ORAL at 21:28

## 2021-02-14 RX ADMIN — Medication 1 TABLET(S): at 10:10

## 2021-02-14 RX ADMIN — INSULIN GLARGINE 40 UNIT(S): 100 INJECTION, SOLUTION SUBCUTANEOUS at 21:28

## 2021-02-14 RX ADMIN — HEPARIN SODIUM 5000 UNIT(S): 5000 INJECTION INTRAVENOUS; SUBCUTANEOUS at 21:27

## 2021-02-14 RX ADMIN — GABAPENTIN 300 MILLIGRAM(S): 400 CAPSULE ORAL at 16:19

## 2021-02-14 RX ADMIN — ATORVASTATIN CALCIUM 10 MILLIGRAM(S): 80 TABLET, FILM COATED ORAL at 21:28

## 2021-02-14 RX ADMIN — HEPARIN SODIUM 5000 UNIT(S): 5000 INJECTION INTRAVENOUS; SUBCUTANEOUS at 05:10

## 2021-02-14 RX ADMIN — Medication 5 MILLIGRAM(S): at 21:28

## 2021-02-14 RX ADMIN — Medication 25 MILLIGRAM(S): at 05:11

## 2021-02-14 RX ADMIN — Medication 4: at 11:50

## 2021-02-14 RX ADMIN — Medication 75 MILLIGRAM(S): at 16:20

## 2021-02-14 RX ADMIN — FAMOTIDINE 20 MILLIGRAM(S): 10 INJECTION INTRAVENOUS at 10:10

## 2021-02-14 RX ADMIN — LOSARTAN POTASSIUM 25 MILLIGRAM(S): 100 TABLET, FILM COATED ORAL at 05:11

## 2021-02-14 RX ADMIN — Medication 13 UNIT(S): at 11:50

## 2021-02-14 RX ADMIN — Medication 6: at 17:23

## 2021-02-14 RX ADMIN — GENTAMICIN SULFATE 1 DROP(S): 3 SOLUTION/ DROPS OPHTHALMIC at 22:21

## 2021-02-14 RX ADMIN — HEPARIN SODIUM 5000 UNIT(S): 5000 INJECTION INTRAVENOUS; SUBCUTANEOUS at 12:40

## 2021-02-14 RX ADMIN — Medication 25 MILLIGRAM(S): at 12:41

## 2021-02-14 RX ADMIN — GENTAMICIN SULFATE 1 DROP(S): 3 SOLUTION/ DROPS OPHTHALMIC at 10:10

## 2021-02-14 RX ADMIN — GENTAMICIN SULFATE 1 DROP(S): 3 SOLUTION/ DROPS OPHTHALMIC at 16:20

## 2021-02-14 RX ADMIN — CEFTRIAXONE 100 MILLIGRAM(S): 500 INJECTION, POWDER, FOR SOLUTION INTRAMUSCULAR; INTRAVENOUS at 15:21

## 2021-02-14 RX ADMIN — Medication 0.1 MILLIGRAM(S): at 16:19

## 2021-02-14 RX ADMIN — Medication 25 MILLIGRAM(S): at 21:28

## 2021-02-14 RX ADMIN — GENTAMICIN SULFATE 1 DROP(S): 3 SOLUTION/ DROPS OPHTHALMIC at 05:10

## 2021-02-14 RX ADMIN — Medication 13 UNIT(S): at 17:23

## 2021-02-14 RX ADMIN — AZATHIOPRINE 100 MILLIGRAM(S): 100 TABLET ORAL at 10:10

## 2021-02-14 NOTE — PROGRESS NOTE ADULT - PROBLEM SELECTOR PLAN 3
DM type 1, hyperglycemic on admission, bg improved now in 200s   -patient is on insulin pump 3 units/hr basal rate x 24 hrs, uses humalog: will hold insulin pump for now as patient's site got disconnected when changing his clothes today (on prior admission had kept pump at 70% basal rate ie 2.1 units/hr x 24 hrs)  -mod dose ISS   -continue lantus 40 units at bedtime  -continue 13 units premeal   -patient prefers MDII, hold insulin pump    -A1c 10  -hypoglycemia protocol   -Endocrine Dr. Craig Perlman consulted; recs appreciated

## 2021-02-14 NOTE — PROGRESS NOTE ADULT - PROBLEM SELECTOR PLAN 1
- Rt foot 3rd distal phalanx  open wound  probe to bone  c/w with OM , Abnormal   - afebrile, no leukocytosis   -XR R Foot: cortical erosions of right 3rd distal phalanx, however, no discrete om   -s/p right 3rd distal digit amputation with Dr. Araiza, POD#2  - Tissue cx growing Staphylococcus lugdunensis  -continue Rocephin 2g IV daily as per ID     -f/u BCx, NGTD   -WBAT with surgical shoe  -PT eval; no skilled PT needed

## 2021-02-14 NOTE — PROGRESS NOTE ADULT - ASSESSMENT
56 YO M with hx CKD s/p renal transplant, CVA, DM, neuropathy, DVT, HLD, Hypertension ,Obesity, squamous cell carcinoma, presents to ED for redness/swelling R leg, admitted for osteomyelitis of R 3rd distal phalanx.

## 2021-02-14 NOTE — PROGRESS NOTE ADULT - SUBJECTIVE AND OBJECTIVE BOX
[facility] DIVISION of INFECTIOUS DISEASE  Marko Saucedo MD PhD, Qiana Steven MD, Claire Castro MD, Tal Russo MD  and providing coverage with Sabrina Hinton MD and Krista Del Valle MD  Providing Infectious Disease Consultations at [facility], [facility]'s    Office# [phone] to schedule follow up appointments  Answering Service for urgent calls or New Consults [phone]  Cell# to text for urgent issues Marko Saucedo [phone]     infectious diseases progress note:    LUTHER NORIEGA is a 55y y. o. Male patient    No concerning overnight events    Allergies    No Known Allergies    Intolerances        ANTIBIOTICS/RELEVANT:  antimicrobials  cefTRIAXone   IVPB 2000 milliGRAM(s) IV Intermittent every 24 hours  trimethoprim   80 mG/sulfamethoxazole 400 mG 1 Tablet(s) Oral daily    immunologic:  azaTHIOprine 100 milliGRAM(s) Oral daily  tacrolimus 1.5 milliGRAM(s) Oral two times a day    OTHER:  atorvastatin 10 milliGRAM(s) Oral at bedtime  cloNIDine 0.1 milliGRAM(s) Oral two times a day  dextrose 40% Gel 15 Gram(s) Oral once  dextrose 5%. 1000 milliLiter(s) IV Continuous <Continuous>  dextrose 5%. 1000 milliLiter(s) IV Continuous <Continuous>  dextrose 50% Injectable 25 Gram(s) IV Push once  dextrose 50% Injectable 25 Gram(s) IV Push once  dextrose 50% Injectable 12.5 Gram(s) IV Push once  famotidine    Tablet 20 milliGRAM(s) Oral daily  gabapentin 300 milliGRAM(s) Oral two times a day  gentamicin 0.3% Ophthalmic Solution 1 Drop(s) Right EYE four times a day  glucagon  Injectable 1 milliGRAM(s) IntraMuscular once  heparin   Injectable 5000 Unit(s) SubCutaneous every 8 hours  hydrALAZINE 25 milliGRAM(s) Oral three times a day  insulin glargine Injectable (LANTUS) 40 Unit(s) SubCutaneous at bedtime  insulin lispro (ADMELOG) corrective regimen sliding scale   SubCutaneous three times a day before meals  insulin lispro (ADMELOG) corrective regimen sliding scale   SubCutaneous at bedtime  insulin lispro Injectable (ADMELOG) 13 Unit(s) SubCutaneous three times a day before meals  losartan 25 milliGRAM(s) Oral daily  melatonin 5 milliGRAM(s) Oral at bedtime PRN  metoprolol tartrate 75 milliGRAM(s) Oral two times a day      Objective:  Vital Signs Last 24 Hrs  T(C): 36.4 (14 Feb 2021 04:50), Max: 36.9 (13 Feb 2021 20:31)  T(F): 97.5 (14 Feb 2021 04:50), Max: 98.5 (13 Feb 2021 20:31)  HR: 71 (14 Feb 2021 04:50) (62 - 71)  BP: 145/69 (14 Feb 2021 04:50) (124/77 - 148/84)  BP(mean): --  RR: 19 (14 Feb 2021 04:50) (16 - 19)  SpO2: 90% (14 Feb 2021 04:50) (90% - 94%)    T(C): 36.4 (02-14-21 @ 04:50), Max: 36.9 (02-13-21 @ 20:31)  T(C): 36.4 (02-14-21 @ 04:50), Max: 36.9 (02-11-21 @ 20:46)  T(C): 36.4 (02-14-21 @ 04:50), Max: 37.1 (02-10-21 @ 21:19)    PHYSICAL EXAM:  HEENT: NC atraumatic  Neck: supple  Respiratory: no accessory muscle use, breathing comfortably  Cardiovascular: distant  Gastrointestinal: normal appearing, nondistended  Extremities: no clubbing, no cyanosis,        LABS:                          14.5   8.53  )-----------( 221      ( 14 Feb 2021 08:17 )             43.8       8.53 02-14 @ 08:17  7.85 02-13 @ 08:01  7.30 02-12 @ 06:45  7.00 02-11 @ 07:11  6.34 02-10 @ 10:11      02-14    138  |  101  |  18  ----------------------------<  197<H>  4.1   |  31  |  1.20    Ca    9.4      14 Feb 2021 08:17    TPro  7.5  /  Alb  3.3  /  TBili  0.7  /  DBili  x   /  AST  12<L>  /  ALT  17  /  AlkPhos  121<H>  02-14      Creatinine, Serum: 1.20 mg/dL (02-14-21 @ 08:17)  Creatinine, Serum: 1.20 mg/dL (02-13-21 @ 08:00)  Creatinine, Serum: 1.20 mg/dL (02-12-21 @ 06:45)  Creatinine, Serum: 1.10 mg/dL (02-11-21 @ 07:11)  Creatinine, Serum: 1.30 mg/dL (02-10-21 @ 10:11)                INFLAMMATORY MARKERS  Auto Neutrophil #: 6.01 K/uL (02-14-21 @ 08:17)  Auto Lymphocyte #: 1.47 K/uL (02-14-21 @ 08:17)  Auto Neutrophil #: 5.61 K/uL (02-13-21 @ 08:01)  Auto Lymphocyte #: 1.24 K/uL (02-13-21 @ 08:01)  Auto Neutrophil #: 4.77 K/uL (02-11-21 @ 07:11)  Auto Lymphocyte #: 1.26 K/uL (02-11-21 @ 07:11)  Auto Neutrophil #: 4.71 K/uL (02-10-21 @ 10:11)  Auto Lymphocyte #: 0.83 K/uL (02-10-21 @ 10:11)    Lactate, Blood: 0.9 mmol/L (02-10-21 @ 10:09)    Auto Eosinophil #: 0.35 K/uL (02-14-21 @ 08:17)  Auto Eosinophil #: 0.33 K/uL (02-13-21 @ 08:01)  Auto Eosinophil #: 0.34 K/uL (02-11-21 @ 07:11)  Auto Eosinophil #: 0.26 K/uL (02-10-21 @ 10:11)                        Activated Partial Thromboplastin Time: 30.5 sec (02-10-21 @ 10:11)  INR: 0.96 ratio (02-10-21 @ 10:11)          MICROBIOLOGY:              RADIOLOGY & ADDITIONAL STUDIES:

## 2021-02-14 NOTE — PROGRESS NOTE ADULT - SUBJECTIVE AND OBJECTIVE BOX
Patient is a 55y old  Male who presents with a chief complaint of osteomyelitis (13 Feb 2021 14:00)     54 YO M with hx CKD s/p renal transplant, CVA, DM, neuropathy, DVT, HLD, Hypertension ,Obesity, squamous cell carcinoma, presents to ED for redness/swelling R leg. Patient states 3 days ago, he first noticed his toe nail on the R 3rd digit starting to "lift up" from a "small pimple" underneath. States there was also clear/red oozing. Patient made an appointment with Dr. Araiza for Friday of this week. Yesterday patient developed redness and swelling in the R lower leg and had blood sugars in the 300s which prompted him to come to the ED. Patient has peripheral neuropathy and states he has no pain in his legs. Denies any fever, chills, SOB, CP, N/V.     Of note, patient was admitted to [facility] last August for osteomyelitis and had left 3rd toe distal digit partial amputation and right foot distal digit 2nd digit on 8/27/20.       In the ED:   VS: T 98F, HR 76, /75, RR 16, SpO2 96% on RA  Labs: glucose 361, alk phos 147, UA neg, covid-19 neg  US duplex venous LE: neg for DVT   EKG: Sinus bradycardia with 1st degree AV block (unchanged from 8/2020)  XR R foot: cortical erosions of right 3rd distal phalanx  CXR: pending official read     S/p Vancomycin 1g IV, Rocephin 2g IV, 1L NS IV bolus     INTERVAL HPI:    2/11/21 - Pt seen and examined at bedside, s/p partial amputation of third digit of right foot. Patient states he feels well, no complaints. Erythema and swelling on RLE are improving on rocephin.     2/12/21 - Pt seen and examined at bedside. POD#1, remains afebrile, VSS. Patient offers no complaints. Improved blood glucose today. Continue rocephin for RLE cellulitis. Pending OR tissue cx, path. On IV Antibiotics.    2/13/21 - Pt seen and examined at bedside. POD#2, no events overnight. Tissue cx growing Staphylococcus lugdunensis. Continue Rocephin 2g IV     2/14 No acute events overnight. Pt seen and examined at bedside. Pt states he felt nausea last night, resolved with gingerale, no further nausea after then. Pt states he feels well, denies LE pain. Pt admits to chronic LE numbness. Pt denies fever chills, CP, SOB, abdominal pain, n/v/c/d, edema.    MEDICATIONS  (STANDING):  atorvastatin 10 milliGRAM(s) Oral at bedtime  azaTHIOprine 100 milliGRAM(s) Oral daily  cefTRIAXone   IVPB 2000 milliGRAM(s) IV Intermittent every 24 hours  cloNIDine 0.1 milliGRAM(s) Oral two times a day  dextrose 40% Gel 15 Gram(s) Oral once  dextrose 5%. 1000 milliLiter(s) (50 mL/Hr) IV Continuous <Continuous>  dextrose 5%. 1000 milliLiter(s) (100 mL/Hr) IV Continuous <Continuous>  dextrose 50% Injectable 25 Gram(s) IV Push once  dextrose 50% Injectable 25 Gram(s) IV Push once  dextrose 50% Injectable 12.5 Gram(s) IV Push once  famotidine    Tablet 20 milliGRAM(s) Oral daily  gabapentin 300 milliGRAM(s) Oral two times a day  gentamicin 0.3% Ophthalmic Solution 1 Drop(s) Right EYE four times a day  glucagon  Injectable 1 milliGRAM(s) IntraMuscular once  heparin   Injectable 5000 Unit(s) SubCutaneous every 8 hours  hydrALAZINE 25 milliGRAM(s) Oral three times a day  insulin glargine Injectable (LANTUS) 40 Unit(s) SubCutaneous at bedtime  insulin lispro (ADMELOG) corrective regimen sliding scale   SubCutaneous three times a day before meals  insulin lispro (ADMELOG) corrective regimen sliding scale   SubCutaneous at bedtime  insulin lispro Injectable (ADMELOG) 13 Unit(s) SubCutaneous three times a day before meals  losartan 25 milliGRAM(s) Oral daily  metoprolol tartrate 75 milliGRAM(s) Oral two times a day  tacrolimus 1.5 milliGRAM(s) Oral two times a day  trimethoprim   80 mG/sulfamethoxazole 400 mG 1 Tablet(s) Oral daily    MEDICATIONS  (PRN):  melatonin 5 milliGRAM(s) Oral at bedtime PRN Insomnia      Allergies    No Known Allergies    Intolerances      REVIEW OF SYSTEMS: i am OK  CONSTITUTIONAL: No fever, No chills, No fatigue, No myalgia, No Body ache, No Weakness  EYES: No eye pain,  No visual disturbances, No discharge, No Redness  ENMT: No ear pain, No nose bleed, No vertigo; No sinus pain, No throat pain, No Congestion  NECK: No pain, No stiffness  RESPIRATORY: No cough, No wheezing, No hemoptysis, No shortness of breath  CARDIOVASCULAR: No chest pain, No palpitations  GASTROINTESTINAL: admits nausea No abdominal pain, No epigastric pain. No vomiting, No diarrhea, No constipation; [x  ] BM  GENITOURINARY: No dysuria, No frequency, No urgency, No hematuria, No incontinence  NEUROLOGICAL: No headaches, No dizziness, No numbness, No tingling, No tremors, No weakness  EXTREMITIES: admits to LE numbness chronic, no LE pain  SKIN: [x  ] No itching, burning, rashes, or lesions   MUSCULOSKELETAL: admits to LE numbness chronic, no LE pain, No joint pain, No joint swelling; No muscle pain, No back pain, No extremity pain  PSYCHIATRIC: No depression, No anxiety, No mood swings, No difficulty sleeping at night  PAIN SCALE: [ x ] None  [  ] Other-  ROS Unable to obtain due to: [  ] Dementia  [  ] Lethargy  [  ] Sedated  [  ] Non verbal  REST OF REVIEW OF SYSTEMS: [ x ] Normal       Vital Signs Last 24 Hrs  T(C): 36.4 (14 Feb 2021 04:50), Max: 36.9 (13 Feb 2021 20:31)  T(F): 97.5 (14 Feb 2021 04:50), Max: 98.5 (13 Feb 2021 20:31)  HR: 71 (14 Feb 2021 04:50) (62 - 71)  BP: 145/69 (14 Feb 2021 04:50) (124/77 - 148/84)  BP(mean): --  RR: 19 (14 Feb 2021 04:50) (16 - 19)  SpO2: 90% (14 Feb 2021 04:50) (90% - 94%)    PHYSICAL EXAM:  GENERAL:  [ x ] NAD, [x  ] Well appearing, [  ] Agitated, [  ] Drowsy, [  ] Lethargy, [  ] Confused   HEAD:  [x  ] Normal, [  ] Other  EYES:  [ x ] EOMI, [ x ] PERRLA, [x  ] Conjunctiva and sclera clear normal, [  ] Other, [  ] Pallor, [  ] Discharge  ENMT:  [ x ] Normal, [x  ] Moist mucous membranes, [x  ] Good dentition, [x  ] No thrush  NECK:  [x  ] Supple, [x  ] No JVD, [x  ] Normal thyroid, [  ] Lymphadenopathy, [  ] Other  CHEST/LUNG:  [ x ] Clear to auscultation bilaterally, [x  ] Breath Sounds equal B/L  [  ] Poor effort, [x  ] No rales, [x  ] No rhonchi, [ x ] No wheezing  HEART:  [x  ] Regular rate and rhythm, [  ] Tachycardia, [  ] Bradycardia, [  ] Irregular, [ x ] No murmurs, No rubs, No gallops, [  ] PPM in place (Mfr:  )  ABDOMEN:  [x  ] Soft, [ x ] Nontender, [x  ] Nondistended, [ x ] No mass, [ x ] Bowel sounds present, [  ] Obese  NERVOUS SYSTEM:  [x  ] Alert & Oriented x3, [ x ] Nonfocal, [  ] Confusion, [  ] Encephalopathic, [  ] Sedated, [  ] Unable to assess, [  ] Dementia, [  ] Other-  EXTREMITIES:  [ x ] 2+ Peripheral Pulses, No clubbing, No cyanosis,  [ x ] Edema Rt  lower EXT, with improving erythema, swelling &  warmth  [x ] PVD stasis skin changes B/L lower EXT, [x  ] Wound dressing Rt Foot, R third digit amputation, pt has had amputations left foot toes   LYMPH:  No lymphadenopathy noted  SKIN:  [  ] No rashes or lesions, [  ] Pressure ulcers, [  ] Ecchymosis, [  ] Skin tears, [ x ] erythema and edema RLE from ankle to mid shin, improving     DIET: Diet, DASH/TLC:   Sodium & Cholesterol Restricted  Consistent Carbohydrate Evening Snack (02-11-21 @ 09:19)    LABS:                        14.5   8.53  )-----------( 221      ( 14 Feb 2021 08:17 )             43.8     CBC Full  -  ( 14 Feb 2021 08:17 )  WBC Count : 8.53 K/uL  Hemoglobin : 14.5 g/dL  Hematocrit : 43.8 %  Platelet Count - Automated : 221 K/uL  Mean Cell Volume : 87.1 fl  Mean Cell Hemoglobin : 28.8 pg  Mean Cell Hemoglobin Concentration : 33.1 gm/dL  Auto Neutrophil # : 6.01 K/uL  Auto Lymphocyte # : 1.47 K/uL  Auto Monocyte # : 0.61 K/uL  Auto Eosinophil # : 0.35 K/uL  Auto Basophil # : 0.06 K/uL  Auto Neutrophil % : 70.4 %  Auto Lymphocyte % : 17.2 %  Auto Monocyte % : 7.2 %  Auto Eosinophil % : 4.1 %  Auto Basophil % : 0.7 %      Ca    9.3        13 Feb 2021 08:00          CAPILLARY BLOOD GLUCOSE      POCT Blood Glucose.: 212 mg/dL (14 Feb 2021 07:13)  POCT Blood Glucose.: 205 mg/dL (13 Feb 2021 20:59)  POCT Blood Glucose.: 166 mg/dL (13 Feb 2021 16:35)  POCT Blood Glucose.: 270 mg/dL (13 Feb 2021 12:33)        Culture - Tissue with Gram Stain (collected 02-11-21 @ 12:36)  Source: .Tissue Other, 3rd toe right foot head of mid  Gram Stain (02-11-21 @ 22:18):    Rare polymorphonuclear leukocytes    Rare Gram Positive Cocci per oil power field    Rare Gram Variable Rods per oil power field  Preliminary Report (02-12-21 @ 11:02):    No growth    Culture - Tissue with Gram Stain (collected 02-11-21 @ 12:35)  Source: .Tissue Other, 3rd toe right foot distal phal  Gram Stain (02-11-21 @ 22:19):    Rare polymorphonuclear leukocytes    Rare Gram positive cocci in pairs per oil power field    Rare Gram Variable Rods per oil power field  Preliminary Report (02-12-21 @ 11:03):    No growth    Culture - Tissue with Gram Stain (collected 02-11-21 @ 01:25)  Source: .Tissue Other, right foot  Gram Stain (02-11-21 @ 06:43):    No polymorphonuclear cells seen per low power field    No organisms seen per oil power field  Preliminary Report (02-13-21 @ 12:45):    Numerous Staphylococcus lugdunensis    Numerous Staphylococcus pettenkoferi  Organism: Staphylococcus lugdunensis (02-13-21 @ 08:40)  Organism: Staphylococcus lugdunensis (02-13-21 @ 08:40)      -  Ampicillin/Sulbactam: S <=8/4      -  Cefazolin: S <=4      -  Clindamycin: S <=0.25      -  Erythromycin: S <=0.25      -  Gentamicin: S <=1 Should not be used as monotherapy      -  Oxacillin: S 0.5      -  Penicillin: R >8      -  RIF- Rifampin: S <=1 Should not be used as monotherapy      -  Tetra/Doxy: S <=1      -  Trimethoprim/Sulfamethoxazole: S <=0.5/9.5      -  Vancomycin: S 1      Method Type: BONILLA    Culture - Blood (collected 02-10-21 @ 16:30)  Source: .Blood Blood-Peripheral  Preliminary Report (02-11-21 @ 17:01):    No growth to date.    Culture - Blood (collected 02-10-21 @ 16:30)  Source: .Blood Blood-Peripheral  Preliminary Report (02-11-21 @ 17:01):    No growth to date.        RADIOLOGY & ADDITIONAL TESTS:    Personally reviewed.     Consultant(s) Notes Reviewed:  [x] YES  [ ] NO     Patient is a 55y old  Male who presents with a chief complaint of osteomyelitis (13 Feb 2021 14:00)     56 YO M with hx CKD s/p renal transplant, CVA, DM, neuropathy, DVT, HLD, Hypertension ,Obesity, squamous cell carcinoma, presents to ED for redness/swelling R leg. Patient states 3 days ago, he first noticed his toe nail on the R 3rd digit starting to "lift up" from a "small pimple" underneath. States there was also clear/red oozing. Patient made an appointment with Dr. Araiza for Friday of this week. Yesterday patient developed redness and swelling in the R lower leg and had blood sugars in the 300s which prompted him to come to the ED. Patient has peripheral neuropathy and states he has no pain in his legs. Denies any fever, chills, SOB, CP, N/V.     Of note, patient was admitted to [facility] last August for osteomyelitis and had left 3rd toe distal digit partial amputation and right foot distal digit 2nd digit on 8/27/20.       In the ED:   VS: T 98F, HR 76, /75, RR 16, SpO2 96% on RA  Labs: glucose 361, alk phos 147, UA neg, covid-19 neg  US duplex venous LE: neg for DVT   EKG: Sinus bradycardia with 1st degree AV block (unchanged from 8/2020)  XR R foot: cortical erosions of right 3rd distal phalanx  CXR: pending official read     S/p Vancomycin 1g IV, Rocephin 2g IV, 1L NS IV bolus     INTERVAL HPI:    2/11/21 - Pt seen and examined at bedside, s/p partial amputation of third digit of right foot. Patient states he feels well, no complaints. Erythema and swelling on RLE are improving on rocephin.     2/12/21 - Pt seen and examined at bedside. POD#1, remains afebrile, VSS. Patient offers no complaints. Improved blood glucose today. Continue rocephin for RLE cellulitis. Pending OR tissue cx, path. On IV Antibiotics.    2/13/21 - Pt seen and examined at bedside. POD#2, no events overnight. Tissue cx growing Staphylococcus lugdunensis. Continue Rocephin 2g IV     2/14 No acute events overnight. Pt seen and examined at bedside. Pt states he felt nausea last night, resolved with gingerale, no further nausea after then. Pt states he feels well, denies LE pain. Pt admits to chronic LE numbness. Pt denies fever chills, CP, SOB, abdominal pain, n/v/c/d, edema.    Home Medications:  aspirin 325 mg oral tablet: 1 tab(s) orally once a day (10 Feb 2021 15:40)  azaTHIOprine 100 mg oral tablet: 1 tab(s) orally once a day (10 Feb 2021 15:40)  cloNIDine 0.1 mg oral tablet: 1 tab(s) orally 2 times a day (10 Feb 2021 15:40)  famotidine 20 mg oral tablet: 1 tab(s) orally once a day (10 Feb 2021 15:40)  gabapentin 300 mg oral capsule: 1 cap(s) orally 2 times a day (10 Feb 2021 15:40)  HumaLOG 100 units/mL injectable solution: 3 unit(s) injectable every hour via insulin pump. TDD: 72 units. (10 Feb 2021 15:40)  hydrALAZINE 25 mg oral tablet: 1 tab(s) orally 3 times a day (10 Feb 2021 15:40)  losartan 25 mg oral tablet: 1 tab(s) orally once a day (10 Feb 2021 15:40)  lovastatin 40 mg oral tablet: 1 tab(s) orally once a day (10 Feb 2021 15:40)  Metoprolol Tartrate 25 mg oral tablet: 3 tab(s) orally 2 times a day    *conf. with patient and pharmacy. 75mg dose, BID* (10 Feb 2021 15:40)  sulfamethoxazole-trimethoprim 400 mg-80 mg oral tablet: 1 tab(s) orally once a day (10 Feb 2021 15:40)  tacrolimus: 1.5 milligram(s) orally 2 times a day (10 Feb 2021 15:40)      MEDICATIONS  (STANDING):  atorvastatin 10 milliGRAM(s) Oral at bedtime  azaTHIOprine 100 milliGRAM(s) Oral daily  cefTRIAXone   IVPB 2000 milliGRAM(s) IV Intermittent every 24 hours  cloNIDine 0.1 milliGRAM(s) Oral two times a day  dextrose 40% Gel 15 Gram(s) Oral once  dextrose 5%. 1000 milliLiter(s) (50 mL/Hr) IV Continuous <Continuous>  dextrose 5%. 1000 milliLiter(s) (100 mL/Hr) IV Continuous <Continuous>  dextrose 50% Injectable 25 Gram(s) IV Push once  dextrose 50% Injectable 25 Gram(s) IV Push once  dextrose 50% Injectable 12.5 Gram(s) IV Push once  famotidine    Tablet 20 milliGRAM(s) Oral daily  gabapentin 300 milliGRAM(s) Oral two times a day  gentamicin 0.3% Ophthalmic Solution 1 Drop(s) Right EYE four times a day  glucagon  Injectable 1 milliGRAM(s) IntraMuscular once  heparin   Injectable 5000 Unit(s) SubCutaneous every 8 hours  hydrALAZINE 25 milliGRAM(s) Oral three times a day  insulin glargine Injectable (LANTUS) 40 Unit(s) SubCutaneous at bedtime  insulin lispro (ADMELOG) corrective regimen sliding scale   SubCutaneous three times a day before meals  insulin lispro (ADMELOG) corrective regimen sliding scale   SubCutaneous at bedtime  insulin lispro Injectable (ADMELOG) 13 Unit(s) SubCutaneous three times a day before meals  losartan 25 milliGRAM(s) Oral daily  metoprolol tartrate 75 milliGRAM(s) Oral two times a day  tacrolimus 1.5 milliGRAM(s) Oral two times a day  trimethoprim   80 mG/sulfamethoxazole 400 mG 1 Tablet(s) Oral daily    MEDICATIONS  (PRN):  melatonin 5 milliGRAM(s) Oral at bedtime PRN Insomnia      Allergies    No Known Allergies    Intolerances      REVIEW OF SYSTEMS: i am OK  CONSTITUTIONAL: No fever, No chills, No fatigue, No myalgia, No Body ache, No Weakness  EYES: No eye pain,  No visual disturbances, No discharge, No Redness  ENMT: No ear pain, No nose bleed, No vertigo; No sinus pain, No throat pain, No Congestion  NECK: No pain, No stiffness  RESPIRATORY: No cough, No wheezing, No hemoptysis, No shortness of breath  CARDIOVASCULAR: No chest pain, No palpitations  GASTROINTESTINAL: admits nausea No abdominal pain, No epigastric pain. No vomiting, No diarrhea, No constipation; [x  ] BM  GENITOURINARY: No dysuria, No frequency, No urgency, No hematuria, No incontinence  NEUROLOGICAL: No headaches, No dizziness, No numbness, No tingling, No tremors, No weakness  EXTREMITIES: admits to LE numbness chronic, no LE pain  SKIN: [x  ] No itching, burning, rashes, or lesions   MUSCULOSKELETAL: admits to LE numbness chronic, no LE pain, No joint pain, No joint swelling; No muscle pain, No back pain, No extremity pain  PSYCHIATRIC: No depression, No anxiety, No mood swings, No difficulty sleeping at night  PAIN SCALE: [ x ] None  [  ] Other-  ROS Unable to obtain due to: [  ] Dementia  [  ] Lethargy  [  ] Sedated  [  ] Non verbal  REST OF REVIEW OF SYSTEMS: [ x ] Normal       Vital Signs Last 24 Hrs  T(C): 36.4 (14 Feb 2021 04:50), Max: 36.9 (13 Feb 2021 20:31)  T(F): 97.5 (14 Feb 2021 04:50), Max: 98.5 (13 Feb 2021 20:31)  HR: 71 (14 Feb 2021 04:50) (62 - 71)  BP: 145/69 (14 Feb 2021 04:50) (124/77 - 148/84)  BP(mean): --  RR: 19 (14 Feb 2021 04:50) (16 - 19)  SpO2: 90% (14 Feb 2021 04:50) (90% - 94%)    PHYSICAL EXAM:  GENERAL:  [ x ] NAD, [x  ] Well appearing, [  ] Agitated, [  ] Drowsy, [  ] Lethargy, [  ] Confused   HEAD:  [x  ] Normal, [  ] Other  EYES:  [ x ] EOMI, [ x ] PERRLA, [x  ] Conjunctiva and sclera clear normal, [  ] Other, [  ] Pallor, [  ] Discharge  ENMT:  [ x ] Normal, [x  ] Moist mucous membranes, [x  ] Good dentition, [x  ] No thrush  NECK:  [x  ] Supple, [x  ] No JVD, [x  ] Normal thyroid, [  ] Lymphadenopathy, [  ] Other  CHEST/LUNG:  [ x ] Clear to auscultation bilaterally, [x  ] Breath Sounds equal B/L  [  ] Poor effort, [x  ] No rales, [x  ] No rhonchi, [ x ] No wheezing  HEART:  [x  ] Regular rate and rhythm, [  ] Tachycardia, [  ] Bradycardia, [  ] Irregular, [ x ] No murmurs, No rubs, No gallops, [  ] PPM in place (Mfr:  )  ABDOMEN:  [x  ] Soft, [ x ] Nontender, [x  ] Nondistended, [ x ] No mass, [ x ] Bowel sounds present, [  ] Obese  NERVOUS SYSTEM:  [x  ] Alert & Oriented x3, [ x ] Nonfocal, [  ] Confusion, [  ] Encephalopathic, [  ] Sedated, [  ] Unable to assess, [  ] Dementia, [  ] Other-  EXTREMITIES:  [ x ] 2+ Peripheral Pulses, No clubbing, No cyanosis,  [ x ] improved  Edema Rt  lower EXT, with improving erythema, swelling &  warmth  [x ] PVD stasis skin changes B/L lower EXT, [x  ] Wound dressing Rt Foot, R third digit amputation, pt has had amputations left foot toes   LYMPH:  No lymphadenopathy noted  SKIN:  [  ] No rashes or lesions, [  ] Pressure ulcers, [  ] Ecchymosis, [  ] Skin tears, [ x ] erythema and edema RLE from ankle to mid shin, improving     DIET: Diet, DASH/TLC:   Sodium & Cholesterol Restricted  Consistent Carbohydrate Evening Snack (02-11-21 @ 09:19)    LABS:                        14.5   8.53  )-----------( 221      ( 14 Feb 2021 08:17 )             43.8     CBC Full  -  ( 14 Feb 2021 08:17 )  WBC Count : 8.53 K/uL  Hemoglobin : 14.5 g/dL  Hematocrit : 43.8 %  Platelet Count - Automated : 221 K/uL  Mean Cell Volume : 87.1 fl  Mean Cell Hemoglobin : 28.8 pg  Mean Cell Hemoglobin Concentration : 33.1 gm/dL  Auto Neutrophil # : 6.01 K/uL  Auto Lymphocyte # : 1.47 K/uL  Auto Monocyte # : 0.61 K/uL  Auto Eosinophil # : 0.35 K/uL  Auto Basophil # : 0.06 K/uL  Auto Neutrophil % : 70.4 %  Auto Lymphocyte % : 17.2 %  Auto Monocyte % : 7.2 %  Auto Eosinophil % : 4.1 %  Auto Basophil % : 0.7 %      14 Feb 2021 08:17    138    |  101    |  18     ----------------------------<  197    4.1     |  31     |  1.20     Ca    9.4        14 Feb 2021 08:17    TPro  7.5    /  Alb  3.3    /  TBili  0.7    /  DBili  x      /  AST  12     /  ALT  17     /  AlkPhos  121    14 Feb 2021 08:17    Ca    9.3        13 Feb 2021 08:00          CAPILLARY BLOOD GLUCOSE      POCT Blood Glucose.: 212 mg/dL (14 Feb 2021 07:13)  POCT Blood Glucose.: 205 mg/dL (13 Feb 2021 20:59)  POCT Blood Glucose.: 166 mg/dL (13 Feb 2021 16:35)  POCT Blood Glucose.: 270 mg/dL (13 Feb 2021 12:33)        Culture - Tissue with Gram Stain (collected 02-11-21 @ 12:36)  Source: .Tissue Other, 3rd toe right foot head of mid  Gram Stain (02-11-21 @ 22:18):    Rare polymorphonuclear leukocytes    Rare Gram Positive Cocci per oil power field    Rare Gram Variable Rods per oil power field  Preliminary Report (02-12-21 @ 11:02):    No growth    Culture - Tissue with Gram Stain (collected 02-11-21 @ 12:35)  Source: .Tissue Other, 3rd toe right foot distal phal  Gram Stain (02-11-21 @ 22:19):    Rare polymorphonuclear leukocytes    Rare Gram positive cocci in pairs per oil power field    Rare Gram Variable Rods per oil power field  Preliminary Report (02-12-21 @ 11:03):    No growth    Culture - Tissue with Gram Stain (collected 02-11-21 @ 01:25)  Source: .Tissue Other, right foot  Gram Stain (02-11-21 @ 06:43):    No polymorphonuclear cells seen per low power field    No organisms seen per oil power field  Preliminary Report (02-13-21 @ 12:45):    Numerous Staphylococcus lugdunensis    Numerous Staphylococcus pettenkoferi  Organism: Staphylococcus lugdunensis (02-13-21 @ 08:40)  Organism: Staphylococcus lugdunensis (02-13-21 @ 08:40)      -  Ampicillin/Sulbactam: S <=8/4      -  Cefazolin: S <=4      -  Clindamycin: S <=0.25      -  Erythromycin: S <=0.25      -  Gentamicin: S <=1 Should not be used as monotherapy      -  Oxacillin: S 0.5      -  Penicillin: R >8      -  RIF- Rifampin: S <=1 Should not be used as monotherapy      -  Tetra/Doxy: S <=1      -  Trimethoprim/Sulfamethoxazole: S <=0.5/9.5      -  Vancomycin: S 1      Method Type: BONILLA    Culture - Blood (collected 02-10-21 @ 16:30)  Source: .Blood Blood-Peripheral  Preliminary Report (02-11-21 @ 17:01):    No growth to date.    Culture - Blood (collected 02-10-21 @ 16:30)  Source: .Blood Blood-Peripheral  Preliminary Report (02-11-21 @ 17:01):    No growth to date.        RADIOLOGY & ADDITIONAL TESTS: NONE      Consultant(s) Notes Reviewed:  [ x ] YES     Care Discussed with [ x ] Consultants, [ x ] Patient, [  ] Family, [  ] HCP, [  ] , [  ] Social Service, [x  ] RN, [  ] Physical Therapy, [  ] Palliative Care Team  DVT PPX: [  ] Lovenox, [  ] SC Heparin, [  ] Coumadin, [  ] Xarelto, [  ] Eliquis, [  ] Pradaxa, [  ] IV Heparin drip, [ x ] SCD, [  ] Ambulation, [  ] Contraindicated 2/2 GI Bleed, [  ] Contraindicated 2/2  Bleed, [  ] Contraindicated 2/2 Brain Bleed  Advanced Directive: [ x ] None, [  ] DNR/DNI

## 2021-02-14 NOTE — PROGRESS NOTE ADULT - ASSESSMENT
56 YO M with hx CKD s/p renal transplant, CVA, DM, neuropathy, DVT, HLD, Hypertension ,Obesity, squamous cell carcinoma, presents to ED for redness/swelling R leg. Patient states 3 days ago, he first noticed his toe nail on the R 3rd digit starting to "lift up" from a "small pimple" underneath. States there was also clear/red oozing. Patient made an appointment with Dr. Araiza for Friday of this week. Yesterday patient developed redness and swelling in the R lower leg and had blood sugars in the 300s which prompted him to come to the ED. Patient has peripheral neuropathy and states he has no pain in his legs. Denies any fever, chills, SOB, CP, N/V.     patient was admitted to [facility] last August for osteomyelitis and had left 3rd toe distal digit partial amputation and right foot distal digit 2nd digit on 8/27/20.     MEDICATIONS  (STANDING):  atorvastatin 10 milliGRAM(s) Oral at bedtime  azaTHIOprine 100 milliGRAM(s) Oral daily  cefTRIAXone   IVPB 2000 milliGRAM(s) IV Intermittent every 24 hours  cloNIDine 0.1 milliGRAM(s) Oral two times a day  dextrose 40% Gel 15 Gram(s) Oral once  dextrose 5%. 1000 milliLiter(s) (50 mL/Hr) IV Continuous <Continuous>  dextrose 5%. 1000 milliLiter(s) (100 mL/Hr) IV Continuous <Continuous>  dextrose 50% Injectable 25 Gram(s) IV Push once  dextrose 50% Injectable 12.5 Gram(s) IV Push once  dextrose 50% Injectable 25 Gram(s) IV Push once  famotidine    Tablet 20 milliGRAM(s) Oral daily  gabapentin 300 milliGRAM(s) Oral two times a day  glucagon  Injectable 1 milliGRAM(s) IntraMuscular once  heparin   Injectable 5000 Unit(s) IV Push once  hydrALAZINE 25 milliGRAM(s) Oral three times a day  insulin glargine Injectable (LANTUS) 25 Unit(s) SubCutaneous at bedtime  insulin lispro (ADMELOG) corrective regimen sliding scale   SubCutaneous three times a day before meals  insulin lispro (ADMELOG) corrective regimen sliding scale   SubCutaneous at bedtime  insulin lispro Injectable (ADMELOG) 8 Unit(s) SubCutaneous three times a day before meals  losartan 25 milliGRAM(s) Oral daily  metoprolol tartrate Oral Tab/Cap - Peds 75 milliGRAM(s) Oral two times a day  tacrolimus 1.5 milliGRAM(s) Oral two times a day  trimethoprim   80 mG/sulfamethoxazole 400 mG 1 Tablet(s) Oral daily

## 2021-02-15 LAB
ALBUMIN SERPL ELPH-MCNC: 3.4 G/DL — SIGNIFICANT CHANGE UP (ref 3.3–5)
ALP SERPL-CCNC: 117 U/L — SIGNIFICANT CHANGE UP (ref 40–120)
ALT FLD-CCNC: 18 U/L — SIGNIFICANT CHANGE UP (ref 12–78)
ANION GAP SERPL CALC-SCNC: 7 MMOL/L — SIGNIFICANT CHANGE UP (ref 5–17)
AST SERPL-CCNC: 17 U/L — SIGNIFICANT CHANGE UP (ref 15–37)
BASOPHILS # BLD AUTO: 0.04 K/UL — SIGNIFICANT CHANGE UP (ref 0–0.2)
BASOPHILS NFR BLD AUTO: 0.5 % — SIGNIFICANT CHANGE UP (ref 0–2)
BILIRUB SERPL-MCNC: 0.7 MG/DL — SIGNIFICANT CHANGE UP (ref 0.2–1.2)
BUN SERPL-MCNC: 22 MG/DL — SIGNIFICANT CHANGE UP (ref 7–23)
CALCIUM SERPL-MCNC: 9.3 MG/DL — SIGNIFICANT CHANGE UP (ref 8.5–10.1)
CHLORIDE SERPL-SCNC: 101 MMOL/L — SIGNIFICANT CHANGE UP (ref 96–108)
CO2 SERPL-SCNC: 32 MMOL/L — HIGH (ref 22–31)
CREAT SERPL-MCNC: 1.4 MG/DL — HIGH (ref 0.5–1.3)
CULTURE RESULTS: SIGNIFICANT CHANGE UP
CULTURE RESULTS: SIGNIFICANT CHANGE UP
EOSINOPHIL # BLD AUTO: 0.38 K/UL — SIGNIFICANT CHANGE UP (ref 0–0.5)
EOSINOPHIL NFR BLD AUTO: 4.4 % — SIGNIFICANT CHANGE UP (ref 0–6)
GLUCOSE SERPL-MCNC: 204 MG/DL — HIGH (ref 70–99)
HCT VFR BLD CALC: 44.1 % — SIGNIFICANT CHANGE UP (ref 39–50)
HGB BLD-MCNC: 14.6 G/DL — SIGNIFICANT CHANGE UP (ref 13–17)
IMM GRANULOCYTES NFR BLD AUTO: 0.3 % — SIGNIFICANT CHANGE UP (ref 0–1.5)
LYMPHOCYTES # BLD AUTO: 1.64 K/UL — SIGNIFICANT CHANGE UP (ref 1–3.3)
LYMPHOCYTES # BLD AUTO: 18.8 % — SIGNIFICANT CHANGE UP (ref 13–44)
MCHC RBC-ENTMCNC: 29 PG — SIGNIFICANT CHANGE UP (ref 27–34)
MCHC RBC-ENTMCNC: 33.1 GM/DL — SIGNIFICANT CHANGE UP (ref 32–36)
MCV RBC AUTO: 87.7 FL — SIGNIFICANT CHANGE UP (ref 80–100)
MONOCYTES # BLD AUTO: 0.68 K/UL — SIGNIFICANT CHANGE UP (ref 0–0.9)
MONOCYTES NFR BLD AUTO: 7.8 % — SIGNIFICANT CHANGE UP (ref 2–14)
NEUTROPHILS # BLD AUTO: 5.96 K/UL — SIGNIFICANT CHANGE UP (ref 1.8–7.4)
NEUTROPHILS NFR BLD AUTO: 68.2 % — SIGNIFICANT CHANGE UP (ref 43–77)
NRBC # BLD: 0 /100 WBCS — SIGNIFICANT CHANGE UP (ref 0–0)
PLATELET # BLD AUTO: 201 K/UL — SIGNIFICANT CHANGE UP (ref 150–400)
POTASSIUM SERPL-MCNC: 4.4 MMOL/L — SIGNIFICANT CHANGE UP (ref 3.5–5.3)
POTASSIUM SERPL-SCNC: 4.4 MMOL/L — SIGNIFICANT CHANGE UP (ref 3.5–5.3)
PROT SERPL-MCNC: 7.3 G/DL — SIGNIFICANT CHANGE UP (ref 6–8.3)
RBC # BLD: 5.03 M/UL — SIGNIFICANT CHANGE UP (ref 4.2–5.8)
RBC # FLD: 14 % — SIGNIFICANT CHANGE UP (ref 10.3–14.5)
SODIUM SERPL-SCNC: 140 MMOL/L — SIGNIFICANT CHANGE UP (ref 135–145)
SPECIMEN SOURCE: SIGNIFICANT CHANGE UP
SPECIMEN SOURCE: SIGNIFICANT CHANGE UP
WBC # BLD: 8.73 K/UL — SIGNIFICANT CHANGE UP (ref 3.8–10.5)
WBC # FLD AUTO: 8.73 K/UL — SIGNIFICANT CHANGE UP (ref 3.8–10.5)

## 2021-02-15 PROCEDURE — 99231 SBSQ HOSP IP/OBS SF/LOW 25: CPT

## 2021-02-15 PROCEDURE — 99232 SBSQ HOSP IP/OBS MODERATE 35: CPT

## 2021-02-15 RX ORDER — INSULIN GLARGINE 100 [IU]/ML
45 INJECTION, SOLUTION SUBCUTANEOUS AT BEDTIME
Refills: 0 | Status: DISCONTINUED | OUTPATIENT
Start: 2021-02-15 | End: 2021-02-16

## 2021-02-15 RX ORDER — INSULIN LISPRO 100/ML
15 VIAL (ML) SUBCUTANEOUS
Refills: 0 | Status: DISCONTINUED | OUTPATIENT
Start: 2021-02-15 | End: 2021-02-16

## 2021-02-15 RX ORDER — SODIUM CHLORIDE 9 MG/ML
1000 INJECTION, SOLUTION INTRAVENOUS
Refills: 0 | Status: DISCONTINUED | OUTPATIENT
Start: 2021-02-15 | End: 2021-02-16

## 2021-02-15 RX ADMIN — ATORVASTATIN CALCIUM 10 MILLIGRAM(S): 80 TABLET, FILM COATED ORAL at 21:02

## 2021-02-15 RX ADMIN — TACROLIMUS 1.5 MILLIGRAM(S): 5 CAPSULE ORAL at 21:02

## 2021-02-15 RX ADMIN — Medication 0.1 MILLIGRAM(S): at 17:16

## 2021-02-15 RX ADMIN — Medication 4: at 07:58

## 2021-02-15 RX ADMIN — TACROLIMUS 1.5 MILLIGRAM(S): 5 CAPSULE ORAL at 08:43

## 2021-02-15 RX ADMIN — INSULIN GLARGINE 45 UNIT(S): 100 INJECTION, SOLUTION SUBCUTANEOUS at 21:54

## 2021-02-15 RX ADMIN — Medication 0.1 MILLIGRAM(S): at 05:10

## 2021-02-15 RX ADMIN — HEPARIN SODIUM 5000 UNIT(S): 5000 INJECTION INTRAVENOUS; SUBCUTANEOUS at 15:21

## 2021-02-15 RX ADMIN — GENTAMICIN SULFATE 1 DROP(S): 3 SOLUTION/ DROPS OPHTHALMIC at 17:16

## 2021-02-15 RX ADMIN — GENTAMICIN SULFATE 1 DROP(S): 3 SOLUTION/ DROPS OPHTHALMIC at 05:11

## 2021-02-15 RX ADMIN — Medication 25 MILLIGRAM(S): at 05:11

## 2021-02-15 RX ADMIN — Medication 15 UNIT(S): at 17:07

## 2021-02-15 RX ADMIN — Medication 5 MILLIGRAM(S): at 23:01

## 2021-02-15 RX ADMIN — Medication 75 MILLIGRAM(S): at 17:16

## 2021-02-15 RX ADMIN — Medication 25 MILLIGRAM(S): at 21:02

## 2021-02-15 RX ADMIN — AZATHIOPRINE 100 MILLIGRAM(S): 100 TABLET ORAL at 11:49

## 2021-02-15 RX ADMIN — Medication 1 TABLET(S): at 11:48

## 2021-02-15 RX ADMIN — SODIUM CHLORIDE 50 MILLILITER(S): 9 INJECTION, SOLUTION INTRAVENOUS at 21:55

## 2021-02-15 RX ADMIN — GABAPENTIN 300 MILLIGRAM(S): 400 CAPSULE ORAL at 05:11

## 2021-02-15 RX ADMIN — HEPARIN SODIUM 5000 UNIT(S): 5000 INJECTION INTRAVENOUS; SUBCUTANEOUS at 05:10

## 2021-02-15 RX ADMIN — GENTAMICIN SULFATE 1 DROP(S): 3 SOLUTION/ DROPS OPHTHALMIC at 23:01

## 2021-02-15 RX ADMIN — LOSARTAN POTASSIUM 25 MILLIGRAM(S): 100 TABLET, FILM COATED ORAL at 05:11

## 2021-02-15 RX ADMIN — CEFTRIAXONE 100 MILLIGRAM(S): 500 INJECTION, POWDER, FOR SOLUTION INTRAMUSCULAR; INTRAVENOUS at 15:21

## 2021-02-15 RX ADMIN — Medication 75 MILLIGRAM(S): at 05:11

## 2021-02-15 RX ADMIN — Medication 25 MILLIGRAM(S): at 15:22

## 2021-02-15 RX ADMIN — Medication 15 UNIT(S): at 11:59

## 2021-02-15 RX ADMIN — FAMOTIDINE 20 MILLIGRAM(S): 10 INJECTION INTRAVENOUS at 11:48

## 2021-02-15 RX ADMIN — GABAPENTIN 300 MILLIGRAM(S): 400 CAPSULE ORAL at 17:16

## 2021-02-15 RX ADMIN — GENTAMICIN SULFATE 1 DROP(S): 3 SOLUTION/ DROPS OPHTHALMIC at 11:51

## 2021-02-15 RX ADMIN — Medication 4: at 11:58

## 2021-02-15 RX ADMIN — HEPARIN SODIUM 5000 UNIT(S): 5000 INJECTION INTRAVENOUS; SUBCUTANEOUS at 21:02

## 2021-02-15 RX ADMIN — Medication 2: at 17:07

## 2021-02-15 NOTE — PROGRESS NOTE ADULT - SUBJECTIVE AND OBJECTIVE BOX
Patient is a 55y Male whom presented to the hospital with s/p End-stage renal failure with renal transplant  12/2013      PAST MEDICAL & SURGICAL HISTORY:  Recurrent squamous cell carcinoma of skin  nose, L arm    History of DVT (deep vein thrombosis)    Squamous cell carcinoma of skin of left ear    CVA (cerebral vascular accident)    Obesity (BMI 30-39.9)    Diabetic peripheral neuropathy    CKD (chronic kidney disease)    Diabetes mellitus    Hyperlipidemia    Hypertension    S/P kidney transplant    End-stage renal failure with renal transplant  12/2013    H/O foot surgery  2005 - right foot - bone fracture - s/p ORIF and subsequent removal of hardware    Vasectomy status  s/p b/l  vasectomy    Ear disease  Left inner ear surgery    Toe infection  2007 L 4th Toe surgery-amputation secondary to osteomyelitis        MEDICATIONS  (STANDING):  atorvastatin 10 milliGRAM(s) Oral at bedtime  azaTHIOprine 100 milliGRAM(s) Oral daily  cefTRIAXone   IVPB 2000 milliGRAM(s) IV Intermittent every 24 hours  cloNIDine 0.1 milliGRAM(s) Oral two times a day  dextrose 40% Gel 15 Gram(s) Oral once  dextrose 5%. 1000 milliLiter(s) (50 mL/Hr) IV Continuous <Continuous>  dextrose 5%. 1000 milliLiter(s) (100 mL/Hr) IV Continuous <Continuous>  dextrose 50% Injectable 25 Gram(s) IV Push once  dextrose 50% Injectable 12.5 Gram(s) IV Push once  dextrose 50% Injectable 25 Gram(s) IV Push once  famotidine    Tablet 20 milliGRAM(s) Oral daily  gabapentin 300 milliGRAM(s) Oral two times a day  glucagon  Injectable 1 milliGRAM(s) IntraMuscular once  heparin   Injectable 5000 Unit(s) IV Push once  hydrALAZINE 25 milliGRAM(s) Oral three times a day  insulin glargine Injectable (LANTUS) 25 Unit(s) SubCutaneous at bedtime  insulin lispro (ADMELOG) corrective regimen sliding scale   SubCutaneous three times a day before meals  insulin lispro (ADMELOG) corrective regimen sliding scale   SubCutaneous at bedtime  insulin lispro Injectable (ADMELOG) 8 Unit(s) SubCutaneous three times a day before meals  losartan 25 milliGRAM(s) Oral daily  metoprolol tartrate Oral Tab/Cap - Peds 75 milliGRAM(s) Oral two times a day  tacrolimus 1.5 milliGRAM(s) Oral two times a day  trimethoprim   80 mG/sulfamethoxazole 400 mG 1 Tablet(s) Oral daily      Allergies    No Known Allergies    Intolerances        SOCIAL HISTORY:  Denies ETOh,Smoking,     FAMILY HISTORY:  FH: skin cancer    Family history of diabetes mellitus (DM)        REVIEW OF SYSTEMS:  CONSTITUTIONAL: No weakness, fevers or chills  NECK: No pain or stiffness  RESPIRATORY: No cough, wheezing, hemoptysis; No shortness of breath  CARDIOVASCULAR: No chest pain or palpitations  GASTROINTESTINAL: No abdominal or epigastric pain. No nausea, vomiting,     No diarrhea or constipation. No melena   GENITOURINARY: No dysuria, frequency or hematuria                                                                   14.6   8.73  )-----------( 201      ( 15 Feb 2021 07:11 )             44.1       CBC Full  -  ( 15 Feb 2021 07:11 )  WBC Count : 8.73 K/uL  RBC Count : 5.03 M/uL  Hemoglobin : 14.6 g/dL  Hematocrit : 44.1 %  Platelet Count - Automated : 201 K/uL  Mean Cell Volume : 87.7 fl  Mean Cell Hemoglobin : 29.0 pg  Mean Cell Hemoglobin Concentration : 33.1 gm/dL  Auto Neutrophil # : 5.96 K/uL  Auto Lymphocyte # : 1.64 K/uL  Auto Monocyte # : 0.68 K/uL  Auto Eosinophil # : 0.38 K/uL  Auto Basophil # : 0.04 K/uL  Auto Neutrophil % : 68.2 %  Auto Lymphocyte % : 18.8 %  Auto Monocyte % : 7.8 %  Auto Eosinophil % : 4.4 %  Auto Basophil % : 0.5 %      02-15    140  |  101  |  22  ----------------------------<  204<H>  4.4   |  32<H>  |  1.40<H>    Ca    9.3      15 Feb 2021 07:11    TPro  7.3  /  Alb  3.4  /  TBili  0.7  /  DBili  x   /  AST  17  /  ALT  18  /  AlkPhos  117  02-15      CAPILLARY BLOOD GLUCOSE      POCT Blood Glucose.: 167 mg/dL (15 Feb 2021 16:39)  POCT Blood Glucose.: 250 mg/dL (15 Feb 2021 11:51)  POCT Blood Glucose.: 201 mg/dL (15 Feb 2021 07:42)      Vital Signs Last 24 Hrs  T(C): 37.6 (15 Feb 2021 20:53), Max: 37.6 (15 Feb 2021 20:53)  T(F): 99.6 (15 Feb 2021 20:53), Max: 99.6 (15 Feb 2021 20:53)  HR: 75 (15 Feb 2021 20:53) (62 - 75)  BP: 118/74 (15 Feb 2021 20:53) (116/73 - 148/84)  BP(mean): --  RR: 18 (15 Feb 2021 20:53) (18 - 20)  SpO2: 96% (15 Feb 2021 20:53) (91% - 96%)                PHYSICAL EXAM:    Constitutional: NAD  HEENT: conjunctive   clear   Neck:  No JVD  Respiratory: CTAB  Cardiovascular: S1 and S2  Gastrointestinal: BS+, soft,   Neurological: A/O x 3, no focal deficits  Skin:  R foot 3rd toe infection, r shin redness

## 2021-02-15 NOTE — PROGRESS NOTE ADULT - ASSESSMENT
54y old  Male who presents with a chief complaint of r/o osteomyelitis, scheduled for L third digit amputation and partial right 2nd digit amputation. S/P partial amputation of third right toe  Non-healing toe ulcers    Possible  OM.    - ID following awaiting cultures  - s/p toe amputation left 3rd digit and right partial 2nd digit   - Tolerated procedure well with no obvious cardiac complications  - Pain control  - Follow ID./ortho recs    History of CVA   - Continue statin  -would resume ASA if cleared by ortho    HTN   - BP stable : 143/71 (02-15-21 @ 04:00) (116/73 - 143/71)  - Continue Losartan 25 QD Can up titrate if needed  - Continue home clonidine 0.1 BID, hydralazine 25 q8, lopressor 75 BID    - Monitor and replete lytes, keep K>4, Mg>2.  - All other medical needs as per primary team.  - Other cardiovascular workup will depend on clinical course.  - Will continue to follow.     54y old  Male who presents with a chief complaint of r/o osteomyelitis, scheduled for L third digit amputation and partial right 2nd digit amputation. S/P partial amputation of third right toe  Non-healing toe ulcers    Possible  OM.    - ID following awaiting cultures  - s/p toe amputation left 3rd digit and right partial 2nd digit   - Tolerated procedure well with no obvious cardiac complications  - Pain control  - Follow ID./ortho recs    History of CVA   - Continue statin  -would resume ASA if cleared by ortho    HTN   - BP stable : 143/71 (02-15-21 @ 04:00) (116/73 - 143/71)  - Continue Losartan 25 QD Can up titrate if needed  - Continue home clonidine 0.1 BID, hydralazine 25 q8, lopressor 75 BID      -Chronic kidney disease,   S/P kidney transplant    -Continue to trend Creatinine:  <--1.40,  <--1.20,    -Renal following    - Monitor and replete lytes, keep K>4, Mg>2.  - All other medical needs as per primary team.  - Other cardiovascular workup will depend on clinical course.  - Will continue to follow.    Anne Raymundo, MS ANP, AGAP  Nurse Practitioner- Cardiology   Spectra #[phone]

## 2021-02-15 NOTE — PROGRESS NOTE ADULT - SUBJECTIVE AND OBJECTIVE BOX
55y year old Male seen at [facility] 2EAS 201 D1 for right foot 3rd digit osteomyelitis, s/p right foot third digit partial amputation.   Denies any fever, chills, nausea, vomiting, chest pain, shortness of breath, or calf pain at this time.    Allergies    No Known Allergies    Intolerances        MEDICATIONS  (STANDING):  atorvastatin 10 milliGRAM(s) Oral at bedtime  azaTHIOprine 100 milliGRAM(s) Oral daily  cefTRIAXone   IVPB 2000 milliGRAM(s) IV Intermittent every 24 hours  cloNIDine 0.1 milliGRAM(s) Oral two times a day  dextrose 40% Gel 15 Gram(s) Oral once  dextrose 5%. 1000 milliLiter(s) (50 mL/Hr) IV Continuous <Continuous>  dextrose 5%. 1000 milliLiter(s) (100 mL/Hr) IV Continuous <Continuous>  dextrose 50% Injectable 25 Gram(s) IV Push once  dextrose 50% Injectable 25 Gram(s) IV Push once  dextrose 50% Injectable 12.5 Gram(s) IV Push once  famotidine    Tablet 20 milliGRAM(s) Oral daily  gabapentin 300 milliGRAM(s) Oral two times a day  glucagon  Injectable 1 milliGRAM(s) IntraMuscular once  heparin   Injectable 5000 Unit(s) SubCutaneous every 8 hours  hydrALAZINE 25 milliGRAM(s) Oral three times a day  insulin glargine Injectable (LANTUS) 35 Unit(s) SubCutaneous at bedtime  insulin lispro (ADMELOG) corrective regimen sliding scale   SubCutaneous three times a day before meals  insulin lispro (ADMELOG) corrective regimen sliding scale   SubCutaneous at bedtime  insulin lispro Injectable (ADMELOG) 10 Unit(s) SubCutaneous three times a day before meals  losartan 25 milliGRAM(s) Oral daily  metoprolol tartrate 75 milliGRAM(s) Oral two times a day  tacrolimus 1.5 milliGRAM(s) Oral two times a day  trimethoprim   80 mG/sulfamethoxazole 400 mG 1 Tablet(s) Oral daily    MEDICATIONS  (PRN):      Vital Signs Last 24 Hrs  T(C): 36.7 (12 Feb 2021 13:08), Max: 36.9 (11 Feb 2021 20:46)  T(F): 98.1 (12 Feb 2021 13:08), Max: 98.4 (11 Feb 2021 20:46)  HR: 60 (12 Feb 2021 13:08) (60 - 69)  BP: 127/71 (12 Feb 2021 13:08) (127/71 - 169/71)  BP(mean): --  RR: 16 (12 Feb 2021 13:08) (16 - 18)  SpO2: 91% (12 Feb 2021 13:08) (90% - 94%)    PHYSICAL EXAM:  Vascular: DP palpable, PT dopplerable bilaterally, Capillary refill 3 seconds, Digital hair present bilaterally  Neurological: Light touch sensation diminished bilaterally  Musculoskeletal: 5/5 strength in all quadrants bilaterally, AJ & STJ ROM intact  Dermatological:  right foot 3rd digit surgical site is clean with no signs of infection.  Skin edges well coapted with sutures intact.    Digit appears and feels well perfused.      CBC Full  -  ( 12 Feb 2021 06:45 )  WBC Count : 7.30 K/uL  RBC Count : 4.93 M/uL  Hemoglobin : 14.2 g/dL  Hematocrit : 42.8 %  Platelet Count - Automated : 208 K/uL  Mean Cell Volume : 86.8 fl  Mean Cell Hemoglobin : 28.8 pg  Mean Cell Hemoglobin Concentration : 33.2 gm/dL  Auto Neutrophil # : x  Auto Lymphocyte # : x  Auto Monocyte # : x  Auto Eosinophil # : x  Auto Basophil # : x  Auto Neutrophil % : x  Auto Lymphocyte % : x  Auto Monocyte % : x  Auto Eosinophil % : x  Auto Basophil % : x      ----------CHEM PANEL----------          Culture - Tissue with Gram Stain (collected 11 Feb 2021 12:36)  Source: .Tissue Other, 3rd toe right foot head of mid  Gram Stain (11 Feb 2021 22:18):    Rare polymorphonuclear leukocytes    Rare Gram Positive Cocci per oil power field    Rare Gram Variable Rods per oil power field  Preliminary Report (12 Feb 2021 11:02):    No growth    Culture - Tissue with Gram Stain (collected 11 Feb 2021 12:35)  Source: .Tissue Other, 3rd toe right foot distal phal  Gram Stain (11 Feb 2021 22:19):    Rare polymorphonuclear leukocytes    Rare Gram positive cocci in pairs per oil power field    Rare Gram Variable Rods per oil power field  Preliminary Report (12 Feb 2021 11:03):    No growth    Culture - Tissue with Gram Stain (collected 11 Feb 2021 01:25)  Source: .Tissue Other, right foot  Gram Stain (11 Feb 2021 06:43):    No polymorphonuclear cells seen per low power field    No organisms seen per oil power field  Preliminary Report (12 Feb 2021 13:12):    Numerous Staphylococcus lugdunensis    Culture - Blood (collected 10 Feb 2021 16:30)  Source: .Blood Blood-Peripheral  Preliminary Report (11 Feb 2021 17:01):    No growth to date.    Culture - Blood (collected 10 Feb 2021 16:30)  Source: .Blood Blood-Peripheral  Preliminary Report (11 Feb 2021 17:01):    No growth to date.        Imaging: ----------

## 2021-02-15 NOTE — PROGRESS NOTE ADULT - SUBJECTIVE AND OBJECTIVE BOX
Follow Up:  HTN, Cardiac optimization pre/post No signs of orthopnea or PND.  Luly Larson Pannella, Patel, Savella, Goodger: Office # [phone]    Follow Up:  HTN, Cardiac optimization pre/post op     Subjective/Observations: Patient seen and examined. Patient awake, alert, resting in bed comfortable. No complaints of chest pain, dyspnea, palpitations or dizziness. No signs of orthopnea or PND. Tolerating room air.     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, L arm    History of DVT (deep vein thrombosis)    Squamous cell carcinoma of skin of left ear    CVA (cerebral vascular accident)    Obesity (BMI 30-39.9)    Diabetic peripheral neuropathy    CKD (chronic kidney disease)  Renal Transplant 2013 at [facility]    Diabetes mellitus    Hyperlipidemia    Hypertension    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    S/P kidney transplant    End-stage renal failure with renal transplant  12/2013    H/O foot surgery  2005 - right foot - bone fracture - s/p ORIF and subsequent removal of hardware    Vasectomy status  s/p b/l  vasectomy    Ear disease  Left inner ear surgery, 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 1000 milliLiter(s) (50 mL/Hr) IV Continuous <Continuous>  dextrose 5%. 1000 milliLiter(s) (100 mL/Hr) IV Continuous <Continuous>  dextrose 50% Injectable 25 Gram(s) IV Push once  dextrose 50% Injectable 25 Gram(s) IV Push once  dextrose 50% Injectable 12.5 Gram(s) IV Push once  famotidine    Tablet 20 milliGRAM(s) Oral daily  gabapentin 300 milliGRAM(s) Oral two times a day  gentamicin 0.3% Ophthalmic Solution 1 Drop(s) Right EYE four times a day  glucagon  Injectable 1 milliGRAM(s) IntraMuscular once  heparin   Injectable 5000 Unit(s) SubCutaneous every 8 hours  hydrALAZINE 25 milliGRAM(s) Oral three times a day  insulin glargine Injectable (LANTUS) 45 Unit(s) SubCutaneous at bedtime  insulin lispro (ADMELOG) corrective regimen sliding scale   SubCutaneous three times a day before meals  insulin lispro (ADMELOG) corrective regimen sliding scale   SubCutaneous at bedtime  insulin lispro Injectable (ADMELOG) 15 Unit(s) SubCutaneous three times a day before meals  losartan 25 milliGRAM(s) Oral daily  metoprolol tartrate 75 milliGRAM(s) Oral two times a day  tacrolimus 1.5 milliGRAM(s) Oral two times a day  trimethoprim   80 mG/sulfamethoxazole 400 mG 1 Tablet(s) Oral daily    MEDICATIONS  (PRN):  melatonin 5 milliGRAM(s) Oral at bedtime PRN Insomnia    Allergies    No Known Allergies    Intolerances      Vital Signs Last 24 Hrs  T(C): 36.8 (15 Feb 2021 04:00), Max: 37.2 (14 Feb 2021 20:21)  T(F): 98.3 (15 Feb 2021 04:00), Max: 99 (14 Feb 2021 20:21)  HR: 72 (15 Feb 2021 04:00) (62 - 90)  BP: 143/71 (15 Feb 2021 04:00) (116/73 - 143/71)  BP(mean): --  RR: 19 (15 Feb 2021 04:00) (17 - 19)  SpO2: 92% (15 Feb 2021 04:00) (92% - 95%)  I&O's Summary        TELE: Not on telemetry   PHYSICAL EXAM:  Appearance: NAD, no distress, alert, Well developed   HEENT: Moist Mucous Membranes, Anicteric  Cardiovascular: Regular rate and rhythm, Normal S1 S2, No JVD, No murmurs, No rubs, gallops or clicks  Respiratory: Non-labored, Clear to auscultation, No rales, No rhonchi, No wheezing.   Gastrointestinal:  Soft, Non-tender, + BS  Neurologic: Non-focal  Skin: Warm and dry, No visible rashes or ulcers, No ecchymosis, No cyanosis  Musculoskeletal: No clubbing, No cyanosis, No joint swelling/tenderness  Psychiatry: Mood & affect appropriate  Lymph: No peripheral edema. R LE with dressing     LABS: All Labs Reviewed:                        14.6   8.73  )-----------( 201      ( 15 Feb 2021 07:11 )             44.1                         14.5   8.53  )-----------( 221      ( 14 Feb 2021 08:17 )             43.8                         14.7   7.85  )-----------( 214      ( 13 Feb 2021 08:01 )             44.0     15 Feb 2021 07:11    140    |  101    |  22     ----------------------------<  204    4.4     |  32     |  1.40   14 Feb 2021 08:17    138    |  101    |  18     ----------------------------<  197    4.1     |  31     |  1.20   13 Feb 2021 08:00    139    |  100    |  15     ----------------------------<  218    4.2     |  34     |  1.20     Ca    9.3        15 Feb 2021 07:11  Ca    9.4        14 Feb 2021 08:17  Ca    9.3        13 Feb 2021 08:00    TPro  7.3    /  Alb  3.4    /  TBili  0.7    /  DBili  x      /  AST  17     /  ALT  18     /  AlkPhos  117    15 Feb 2021 07:11  TPro  7.5    /  Alb  3.3    /  TBili  0.7    /  DBili  x      /  AST  12     /  ALT  17     /  AlkPhos  121    14 Feb 2021 08:17  TPro  7.4    /  Alb  3.3    /  TBili  0.6    /  DBili  x      /  AST  12     /  ALT  19     /  AlkPhos  124    13 Feb 2021 08:00    12 Lead ECG:   Ventricular Rate 59 BPM    Atrial Rate 59 BPM    P-R Interval 240 ms    QRS Duration 94 ms    Q-T Interval 398 ms    QTC Calculation(Bazett) 394 ms    P Axis 43 degrees    R Axis -1 degrees    T Axis 22 degrees    Diagnosis Line Sinus bradycardia with 1st degree AV block  Confirmed by ANDREW WELDON ([id number]) on 2/10/2021 12:54:04 PM (02-10-21 @ 10:00)

## 2021-02-15 NOTE — DIETITIAN INITIAL EVALUATION ADULT. - PROBLEM SELECTOR PLAN 6
Renal transplant recipient, s/p right renal transplant for CKD (2013)  -continue home azathioprine 100 mg qD and tacrolimus 1.5 mg BID   -continue home SMX -80 mg qD for chronic UTI ppx

## 2021-02-15 NOTE — PROGRESS NOTE ADULT - SUBJECTIVE AND OBJECTIVE BOX
Patient is a 55y old  Male who presents with a chief complaint of osteomyelitis (15 Feb 2021 14:01)    HPI:   54 YO M with hx CKD s/p renal transplant, CVA, DM, neuropathy, DVT, HLD, Hypertension ,Obesity, squamous cell carcinoma, presents to ED for redness/swelling R leg. Patient states 3 days ago, he first noticed his toe nail on the R 3rd digit starting to "lift up" from a "small pimple" underneath. States there was also clear/red oozing. Patient made an appointment with Dr. Araiza for Friday of this week. Yesterday patient developed redness and swelling in the R lower leg and had blood sugars in the 300s which prompted him to come to the ED. Patient has peripheral neuropathy and states he has no pain in his legs. Denies any fever, chills, SOB, CP, N/V.     Of note, patient was admitted to [facility] last August for osteomyelitis and had left 3rd toe distal digit partial amputation and right foot distal digit 2nd digit on 8/27/20.       In the ED:   VS: T 98F, HR 76, /75, RR 16, SpO2 96% on RA  Labs: glucose 361, alk phos 147, UA neg, covid-19 neg  US duplex venous LE: neg for DVT   EKG: Sinus bradycardia with 1st degree AV block (unchanged from 8/2020)  XR R foot: cortical erosions of right 3rd distal phalanx  CXR: pending official read     S/p Vancomycin 1g IV, Rocephin 2g IV, 1L NS IV bolus  (10 Feb 2021 15:11)    INTERVAL HPI:  2/11/21 - Pt seen and examined at bedside, s/p partial amputation of third digit of right foot. Patient states he feels well, no complaints. Erythema and swelling on RLE are improving on rocephin.     2/12/21 - Pt seen and examined at bedside. POD#1, remains afebrile, VSS. Patient offers no complaints. Improved blood glucose today. Continue rocephin for RLE cellulitis. Pending OR tissue cx, path. On IV Antibiotics.    2/13/21 - Pt seen and examined at bedside. POD#2, no events overnight. Tissue cx growing Staphylococcus lugdunensis. Continue Rocephin 2g IV     2/1421: Pt seen, examined  No acute events overnight. Pt seen and examined at bedside. Pt states he felt nausea last night, resolved with ginger ale no further nausea after then. Pt states he feels well, denies LE pain. Pt admits to chronic LE numbness. Pt denies fever chills, CP, SOB, abdominal pain, n/v/c/d, edema.    2/15/21: Pt seen, examined, OOB to chair, NO Complaint, On IV Abx awaiting Bone Pathology. Ambulating,    OVERNIGHT EVENTS: NONE    Home Medications:  aspirin 325 mg oral tablet: 1 tab(s) orally once a day (10 Feb 2021 15:40)  azaTHIOprine 100 mg oral tablet: 1 tab(s) orally once a day (10 Feb 2021 15:40)  cloNIDine 0.1 mg oral tablet: 1 tab(s) orally 2 times a day (10 Feb 2021 15:40)  famotidine 20 mg oral tablet: 1 tab(s) orally once a day (10 Feb 2021 15:40)  gabapentin 300 mg oral capsule: 1 cap(s) orally 2 times a day (10 Feb 2021 15:40)  HumaLOG 100 units/mL injectable solution: 3 unit(s) injectable every hour via insulin pump. TDD: 72 units. (10 Feb 2021 15:40)  hydrALAZINE 25 mg oral tablet: 1 tab(s) orally 3 times a day (10 Feb 2021 15:40)  losartan 25 mg oral tablet: 1 tab(s) orally once a day (10 Feb 2021 15:40)  lovastatin 40 mg oral tablet: 1 tab(s) orally once a day (10 Feb 2021 15:40)  Metoprolol Tartrate 25 mg oral tablet: 3 tab(s) orally 2 times a day    *conf. with patient and pharmacy. 75mg dose, BID* (10 Feb 2021 15:40)  sulfamethoxazole-trimethoprim 400 mg-80 mg oral tablet: 1 tab(s) orally once a day (10 Feb 2021 15:40)  tacrolimus: 1.5 milligram(s) orally 2 times a day (10 Feb 2021 15:40)      MEDICATIONS  (STANDING):  atorvastatin 10 milliGRAM(s) Oral at bedtime  azaTHIOprine 100 milliGRAM(s) Oral daily  cefTRIAXone   IVPB 2000 milliGRAM(s) IV Intermittent every 24 hours  cloNIDine 0.1 milliGRAM(s) Oral two times a day  dextrose 40% Gel 15 Gram(s) Oral once  dextrose 5%. 1000 milliLiter(s) (50 mL/Hr) IV Continuous <Continuous>  dextrose 5%. 1000 milliLiter(s) (100 mL/Hr) IV Continuous <Continuous>  dextrose 50% Injectable 25 Gram(s) IV Push once  dextrose 50% Injectable 25 Gram(s) IV Push once  dextrose 50% Injectable 12.5 Gram(s) IV Push once  famotidine    Tablet 20 milliGRAM(s) Oral daily  gabapentin 300 milliGRAM(s) Oral two times a day  gentamicin 0.3% Ophthalmic Solution 1 Drop(s) Right EYE four times a day  glucagon  Injectable 1 milliGRAM(s) IntraMuscular once  heparin   Injectable 5000 Unit(s) SubCutaneous every 8 hours  hydrALAZINE 25 milliGRAM(s) Oral three times a day  insulin glargine Injectable (LANTUS) 45 Unit(s) SubCutaneous at bedtime  insulin lispro (ADMELOG) corrective regimen sliding scale   SubCutaneous three times a day before meals  insulin lispro (ADMELOG) corrective regimen sliding scale   SubCutaneous at bedtime  insulin lispro Injectable (ADMELOG) 15 Unit(s) SubCutaneous three times a day before meals  losartan 25 milliGRAM(s) Oral daily  metoprolol tartrate 75 milliGRAM(s) Oral two times a day  tacrolimus 1.5 milliGRAM(s) Oral two times a day  trimethoprim   80 mG/sulfamethoxazole 400 mG 1 Tablet(s) Oral daily    MEDICATIONS  (PRN):  melatonin 5 milliGRAM(s) Oral at bedtime PRN Insomnia      Allergies    No Known Allergies    Intolerances        Social History:  , lives with wife and 3 kids, retired, social drinker, denies tobacco or recreational drug use, independent in ADLs, walks without assistance (10 Feb 2021 15:11)    REVIEW OF SYSTEMS:  CONSTITUTIONAL: No fever, No chills, No fatigue, No myalgia, No Body ache, No Weakness  EYES: No eye pain,  No visual disturbances, No discharge, NO Redness  ENMT:  No ear pain, No nose bleed, No vertigo; No sinus pain, NO throat pain, No Congestion  NECK: No pain, No stiffness  RESPIRATORY: No cough, NO wheezing, No  hemoptysis, NO  shortness of breath  CARDIOVASCULAR: No chest pain, palpitations  GASTROINTESTINAL: No abdominal pain, NO epigastric pain. No nausea, No vomiting; No diarrhea, No constipation. [ x ] BM  GENITOURINARY: No dysuria, No frequency, No urgency, No hematuria, NO incontinence  NEUROLOGICAL: No headaches, No dizziness, No numbness, No tingling, No tremors, No weakness  EXT: No Swelling, No Pain, No Edema  SKIN:  [ x ] No itching, burning, rashes, or lesions   MUSCULOSKELETAL: No joint pain ,No Jt swelling; No muscle pain, No back pain, No extremity pain  PSYCHIATRIC: No depression,  No anxiety,  No mood swings ,No difficulty sleeping at night  PAIN SCALE: [x  ] None  [  ] Other-  ROS Unable to obtain due to - [  ] Dementia  [  ] Lethargy [  ] Drowsy [  ] Sedated [  ] non verbal  REST OF REVIEW Of SYSTEM - [x  ] Normal     Vital Signs Last 24 Hrs  T(C): 36.5 (15 Feb 2021 14:13), Max: 37.2 (14 Feb 2021 20:21)  T(F): 97.7 (15 Feb 2021 14:13), Max: 99 (14 Feb 2021 20:21)  HR: 65 (15 Feb 2021 14:13) (62 - 90)  BP: 148/84 (15 Feb 2021 14:13) (116/73 - 148/84)  BP(mean): --  RR: 20 (15 Feb 2021 14:13) (17 - 20)  SpO2: 91% (15 Feb 2021 14:13) (91% - 95%)  Finger Stick        PHYSICAL EXAM:  GENERAL:  [ x ] NAD, [x  ] Well appearing, [  ] Agitated, [  ] Drowsy, [  ] Lethargy, [  ] Confused   HEAD:  [x  ] Normal, [  ] Other  EYES:  [ x ] EOMI, [ x ] PERRLA, [x  ] Conjunctiva and sclera clear normal, [  ] Other, [  ] Pallor, [  ] Discharge  ENMT:  [ x ] Normal, [x  ] Moist mucous membranes, [x  ] Good dentition, [x  ] No thrush  NECK:  [x  ] Supple, [x  ] No JVD, [x  ] Normal thyroid, [  ] Lymphadenopathy, [  ] Other  CHEST/LUNG:  [ x ] Clear to auscultation bilaterally, [x  ] Breath Sounds equal B/L  [  ] Poor effort, [x  ] No rales, [x  ] No rhonchi, [ x ] No wheezing  HEART:  [x  ] Regular rate and rhythm, [  ] Tachycardia, [  ] Bradycardia, [  ] Irregular, [ x ] No murmurs, No rubs, No gallops, [  ] PPM in place (Mfr:  )  ABDOMEN:  [x  ] Soft, [ x ] Nontender, [x  ] Nondistended, [ x ] No mass, [ x ] Bowel sounds present, [  ] Obese  NERVOUS SYSTEM:  [x  ] Alert & Oriented x3, [ x ] Nonfocal, [  ] Confusion, [  ] Encephalopathic, [  ] Sedated, [  ] Unable to assess, [  ] Dementia, [  ] Other-  EXTREMITIES:  [ x ] 2+ Peripheral Pulses, No clubbing, No cyanosis,  [ x ] improved  Edema Rt  lower EXT, with improving erythema, swelling &  warmth  [x ] PVD stasis skin changes B/L lower EXT, [x  ] Wound dressing Rt Foot, R third digit amputation, pt has had amputations left foot toes   LYMPH:  No lymphadenopathy noted  SKIN:  [  ] No rashes or lesions, [  ] Pressure ulcers, [  ] Ecchymosis, [  ] Skin tears, [ x ] erythema and edema RLE from ankle to mid shin, improving     DIET: Diet, DASH/TLC:   Sodium & Cholesterol Restricted  Consistent Carbohydrate Evening Snack (02-11-21 @ 09:19)      LABS:                        14.6   8.73  )-----------( 201      ( 15 Feb 2021 07:11 )             44.1     15 Feb 2021 07:11    140    |  101    |  22     ----------------------------<  204    4.4     |  32     |  1.40     Ca    9.3        15 Feb 2021 07:11    TPro  7.3    /  Alb  3.4    /  TBili  0.7    /  DBili  x      /  AST  17     /  ALT  18     /  AlkPhos  117    15 Feb 2021 07:11      Culture Results:   No growth (02-11 @ 12:36)  Culture Results:   No growth (02-11 @ 12:35)  Culture Results:   Numerous Staphylococcus lugdunensis  Numerous Coag Negative Staphylococcus (02-11 @ 01:25)  Culture Results:   No growth to date. (02-10 @ 16:30)  Culture Results:   No growth to date. (02-10 @ 16:30)    Culture - Tissue with Gram Stain (collected 11 Feb 2021 12:36)  Source: .Tissue Other, 3rd toe right foot head of mid  Gram Stain (11 Feb 2021 22:18):    Rare polymorphonuclear leukocytes    Rare Gram Positive Cocci per oil power field    Rare Gram Variable Rods per oil power field  Preliminary Report (12 Feb 2021 11:02):    No growth    Culture - Tissue with Gram Stain (collected 11 Feb 2021 12:35)  Source: .Tissue Other, 3rd toe right foot distal phal  Gram Stain (11 Feb 2021 22:19):    Rare polymorphonuclear leukocytes    Rare Gram positive cocci in pairs per oil power field    Rare Gram Variable Rods per oil power field  Preliminary Report (12 Feb 2021 11:03):    No growth    Culture - Tissue with Gram Stain (collected 11 Feb 2021 01:25)  Source: .Tissue Other, right foot  Gram Stain (11 Feb 2021 06:43):    No polymorphonuclear cells seen per low power field    No organisms seen per oil power field  Preliminary Report (14 Feb 2021 15:49):    Numerous Staphylococcus lugdunensis    Numerous Coag Negative Staphylococcus  Organism: Staphylococcus lugdunensis  Coag Negative Staphylococcus (14 Feb 2021 15:48)  Organism: Coag Negative Staphylococcus (14 Feb 2021 15:48)  Organism: Staphylococcus lugdunensis (13 Feb 2021 08:40)    Culture - Blood (collected 10 Feb 2021 16:30)  Source: .Blood Blood-Peripheral  Preliminary Report (11 Feb 2021 17:01):    No growth to date.    Culture - Blood (collected 10 Feb 2021 16:30)  Source: .Blood Blood-Peripheral  Preliminary Report (11 Feb 2021 17:01):    No growth to date.    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    HTN (hypertension)  HTN (hypertension)    Need for prophylactic measure  Need for prophylactic measure    Renal transplant recipient  Renal transplant recipient    CKD (chronic kidney disease)  CKD (chronic kidney disease)    CVA (cerebral vascular accident)  CVA (cerebral vascular accident)    Diabetes mellitus  Diabetes mellitus    Cellulitis  Cellulitis    Osteomyelitis of right foot, unspecified type  Osteomyelitis of right foot, unspecified type      Consultant(s) Notes Reviewed:  [ x ] YES     Care Discussed with [X] Consultants  [ x ] Patient  [  ] Family [  ] HCP [  ]   [  ] Social Service  [ x ] RN, [  ] Physical Therapy,[  ] Palliative care team  DVT PPX: [  ] Lovenox, [  ] S C Heparin, [  ] Coumadin, [  ] Xarelto, [  ] Eliquis, [  ] Pradaxa, [  ] IV Heparin drip, [  ] SCD [  ] Contraindication 2 to GI Bleed,[  ] Ambulation [  ] Contraindicated 2 to  bleed [  ] Contraindicated 2 to Brain Bleed  Advanced directive: [x  ] None, [  ] DNR/DNI

## 2021-02-15 NOTE — DIETITIAN INITIAL EVALUATION ADULT. - PROBLEM SELECTOR PLAN 4
Hypertension.    BP initially 194/97 in ER, pt did not take his home meds today  -BP improved to 137/75  -continue home clonidine .1 mg BID , and metoprolol 75 mg BID and losartan 25 mg qD

## 2021-02-15 NOTE — PROGRESS NOTE ADULT - PROBLEM SELECTOR PLAN 1
increase lantus 45 units qhs  increase admelog 15 units 3x/day before meals   cont mod dose admelog scale coverage qac/qhs  cont cons cho diet  goal bg 100-180 in hosp setting

## 2021-02-15 NOTE — PROGRESS NOTE ADULT - SUBJECTIVE AND OBJECTIVE BOX
CAPILLARY BLOOD GLUCOSE      POCT Blood Glucose.: 242 mg/dL (14 Feb 2021 21:03)  POCT Blood Glucose.: 266 mg/dL (14 Feb 2021 16:40)  POCT Blood Glucose.: 215 mg/dL (14 Feb 2021 11:48)      Vital Signs Last 24 Hrs  T(C): 36.8 (15 Feb 2021 04:00), Max: 37.2 (14 Feb 2021 20:21)  T(F): 98.3 (15 Feb 2021 04:00), Max: 99 (14 Feb 2021 20:21)  HR: 72 (15 Feb 2021 04:00) (62 - 90)  BP: 143/71 (15 Feb 2021 04:00) (116/73 - 143/71)  BP(mean): --  RR: 19 (15 Feb 2021 04:00) (17 - 19)  SpO2: 92% (15 Feb 2021 04:00) (92% - 95%)    General: WN/WD NAD  Respiratory: CTA B/L  CV: RRR, S1S2, no murmurs, rubs or gallops  Abdominal: Soft, NT, ND +BS, Last BM  Extremities: le foot dsg intact     02-14    138  |  101  |  18  ----------------------------<  197<H>  4.1   |  31  |  1.20    Ca    9.4      14 Feb 2021 08:17    TPro  7.5  /  Alb  3.3  /  TBili  0.7  /  DBili  x   /  AST  12<L>  /  ALT  17  /  AlkPhos  121<H>  02-14      atorvastatin 10 milliGRAM(s) Oral at bedtime  dextrose 40% Gel 15 Gram(s) Oral once  dextrose 50% Injectable 25 Gram(s) IV Push once  dextrose 50% Injectable 25 Gram(s) IV Push once  dextrose 50% Injectable 12.5 Gram(s) IV Push once  glucagon  Injectable 1 milliGRAM(s) IntraMuscular once  insulin glargine Injectable (LANTUS) 40 Unit(s) SubCutaneous at bedtime  insulin lispro (ADMELOG) corrective regimen sliding scale   SubCutaneous three times a day before meals  insulin lispro (ADMELOG) corrective regimen sliding scale   SubCutaneous at bedtime  insulin lispro Injectable (ADMELOG) 13 Unit(s) SubCutaneous three times a day before meals

## 2021-02-15 NOTE — PROGRESS NOTE ADULT - ASSESSMENT
54 YO M with hx CKD s/p renal transplant, CVA, DM, neuropathy, DVT, HLD, Hypertension ,Obesity, squamous cell carcinoma, presents to ED for redness/swelling R leg, admitted for osteomyelitis of R 3rd distal phalanx.

## 2021-02-15 NOTE — PROGRESS NOTE ADULT - ASSESSMENT
56 YO M with hx CKD s/p renal transplant, CVA, DM, neuropathy, DVT, HLD, Hypertension ,Obesity, squamous cell carcinoma, presents to ED for redness/swelling R leg. Patient states 3 days ago, he first noticed his toe nail on the R 3rd digit starting to "lift up" from a "small pimple" underneath. States there was also clear/red oozing. Patient made an appointment with Dr. Araiza for Friday of this week. Yesterday patient developed redness and swelling in the R lower leg and had blood sugars in the 300s which prompted him to come to the ED. Patient has peripheral neuropathy and states he has no pain in his legs. Denies any fever, chills, SOB, CP, N/V.     patient was admitted to [facility] last August for osteomyelitis and had left 3rd toe distal digit partial amputation and right foot distal digit 2nd digit on 8/27/20.     MEDICATIONS  (STANDING):  atorvastatin 10 milliGRAM(s) Oral at bedtime  azaTHIOprine 100 milliGRAM(s) Oral daily  cefTRIAXone   IVPB 2000 milliGRAM(s) IV Intermittent every 24 hours  cloNIDine 0.1 milliGRAM(s) Oral two times a day  dextrose 40% Gel 15 Gram(s) Oral once  dextrose 5%. 1000 milliLiter(s) (50 mL/Hr) IV Continuous <Continuous>  dextrose 5%. 1000 milliLiter(s) (100 mL/Hr) IV Continuous <Continuous>  dextrose 50% Injectable 25 Gram(s) IV Push once  dextrose 50% Injectable 12.5 Gram(s) IV Push once  dextrose 50% Injectable 25 Gram(s) IV Push once  famotidine    Tablet 20 milliGRAM(s) Oral daily  gabapentin 300 milliGRAM(s) Oral two times a day  glucagon  Injectable 1 milliGRAM(s) IntraMuscular once  heparin   Injectable 5000 Unit(s) IV Push once  hydrALAZINE 25 milliGRAM(s) Oral three times a day  insulin glargine Injectable (LANTUS) 25 Unit(s) SubCutaneous at bedtime  insulin lispro (ADMELOG) corrective regimen sliding scale   SubCutaneous three times a day before meals  insulin lispro (ADMELOG) corrective regimen sliding scale   SubCutaneous at bedtime  insulin lispro Injectable (ADMELOG) 8 Unit(s) SubCutaneous three times a day before meals  losartan 25 milliGRAM(s) Oral daily  metoprolol tartrate Oral Tab/Cap - Peds 75 milliGRAM(s) Oral two times a day  tacrolimus 1.5 milliGRAM(s) Oral two times a day  trimethoprim   80 mG/sulfamethoxazole 400 mG 1 Tablet(s) Oral daily

## 2021-02-15 NOTE — DIETITIAN INITIAL EVALUATION ADULT. - OTHER INFO
Pt admit with DM foot wound, osteomyelitis. Pt known from last admission in August where education was provided. At that time pt stated he was getting a CGM, but never followed through, got tired of checking fingersticks and had no signs of hyperglycemia so just ignored need for accuchecks.  Wt stable, BMI 41.

## 2021-02-15 NOTE — PROGRESS NOTE ADULT - PROBLEM SELECTOR PLAN 1
Pt assessed  Surgical site cleansed with NS, applied aquacel DSD overlying  Pending cx/path  Pt is WBAT with surgical shoe only  Podiatry will continue to follow pt while in house    WOUND CARE INSTRUCTIONS  1) Keep dressing clean, dry, intact until follow up.    2) Monitor foot for signs of infection including redness, swelling, drainage, foul odor.   If any are noted, please come to ED immediately.   3) Please follow up with Dr. Araiza at [facility] within 3-5 days of discharge.

## 2021-02-15 NOTE — DIETITIAN INITIAL EVALUATION ADULT. - PROBLEM SELECTOR PLAN 5
Maksim CVA (2015) - residual deficits of loss of pain and temperature sensation in left arm and right face  -continue home gabapentin for residual neuropathic facial pain.  continue gabapentin 300 mg BID

## 2021-02-15 NOTE — DIETITIAN INITIAL EVALUATION ADULT. - ORAL INTAKE PTA/DIET HISTORY
Per pt, has insulin pump, has not been testing  glu. Previously educated on DM meal planning, no questions. Did not get CGM  in past as planned.

## 2021-02-15 NOTE — PROGRESS NOTE ADULT - PROBLEM SELECTOR PLAN 2
CHRONIC KIDNEY DISEASE, STAGE 1: s/p kidney transplant   Serum creatinine is increase to  1.4 , will start 1/2 ns 50 cc per hr   There is no progression.  No uremic symptoms. No evidence of  worsening  Anemia. Fluid status stable.   Will continue to avoid nephrotoxic drugs.  Patient remains asymptomatic.  Continue current therapy. continue to avoid nephrotoxic drugs.  Patient remains asymptomatic.  Continue current therapy.

## 2021-02-15 NOTE — PROGRESS NOTE ADULT - PROBLEM SELECTOR PLAN 2
-RLE warmth, swelling, redness below knee x1 day Toe OM-improving   -responding well to IV abx  -continue Rocephin 2g IV daily

## 2021-02-15 NOTE — DIETITIAN INITIAL EVALUATION ADULT. - PROBLEM SELECTOR PLAN 3
DM type 1, hyperglycemic on admission, bg in 300s  -patient is on insulin pump 3 units/hr basal rate x 24 hrs, uses humalog: will hold insulin pump for now as patient's site got disconnected when changing his clothes today (on prior admission had kept pump at 70% basal rate ie 2.1 units/hr x 24 hrs)  -family to bring tube for pump, HOLD insulin pump until tomorrow evening (24 after bedtime lantus)   -low dose ISS  -will start lantus 25 units at bedtime  -8 units premeal (70% of premeal)   -f/u A1c, hypogycemia protocol   -consult Dr. Craig Perlman

## 2021-02-15 NOTE — DIETITIAN INITIAL EVALUATION ADULT. - PROBLEM SELECTOR PLAN 2
-RLE warmth, swelling, redness below knee x1 day Toe OM  -s/p vancomycin, Rocephin, IV fluid bolus in ED  -continue vancomycin 1g and Rocephin 2g IV

## 2021-02-15 NOTE — PROGRESS NOTE ADULT - SUBJECTIVE AND OBJECTIVE BOX
[facility] DIVISION of INFECTIOUS DISEASE  Marko Saucedo MD PhD, Qiana Steven MD, Claire Castro MD, Tal Russo MD  and providing coverage with Sabrina Hinton MD and Krista Del Valle MD  Providing Infectious Disease Consultations at [facility]'s    Office# [phone] to schedule follow up appointments  Answering Service for urgent calls or New Consults [phone]  Cell# to text for urgent issues Marko Saucedo [phone]     infectious diseases progress note:    LUTHER NORIEGA is a 55y y. o. Male patient    No concerning overnight events    Allergies    No Known Allergies    Intolerances        ANTIBIOTICS/RELEVANT:  antimicrobials  cefTRIAXone   IVPB 2000 milliGRAM(s) IV Intermittent every 24 hours  trimethoprim   80 mG/sulfamethoxazole 400 mG 1 Tablet(s) Oral daily    immunologic:  azaTHIOprine 100 milliGRAM(s) Oral daily  tacrolimus 1.5 milliGRAM(s) Oral two times a day    OTHER:  atorvastatin 10 milliGRAM(s) Oral at bedtime  cloNIDine 0.1 milliGRAM(s) Oral two times a day  dextrose 40% Gel 15 Gram(s) Oral once  dextrose 5%. 1000 milliLiter(s) IV Continuous <Continuous>  dextrose 5%. 1000 milliLiter(s) IV Continuous <Continuous>  dextrose 50% Injectable 25 Gram(s) IV Push once  dextrose 50% Injectable 25 Gram(s) IV Push once  dextrose 50% Injectable 12.5 Gram(s) IV Push once  famotidine    Tablet 20 milliGRAM(s) Oral daily  gabapentin 300 milliGRAM(s) Oral two times a day  gentamicin 0.3% Ophthalmic Solution 1 Drop(s) Right EYE four times a day  glucagon  Injectable 1 milliGRAM(s) IntraMuscular once  heparin   Injectable 5000 Unit(s) SubCutaneous every 8 hours  hydrALAZINE 25 milliGRAM(s) Oral three times a day  insulin glargine Injectable (LANTUS) 45 Unit(s) SubCutaneous at bedtime  insulin lispro (ADMELOG) corrective regimen sliding scale   SubCutaneous three times a day before meals  insulin lispro (ADMELOG) corrective regimen sliding scale   SubCutaneous at bedtime  insulin lispro Injectable (ADMELOG) 15 Unit(s) SubCutaneous three times a day before meals  losartan 25 milliGRAM(s) Oral daily  melatonin 5 milliGRAM(s) Oral at bedtime PRN  metoprolol tartrate 75 milliGRAM(s) Oral two times a day      Objective:  Vital Signs Last 24 Hrs  T(C): 36.8 (15 Feb 2021 04:00), Max: 37.2 (14 Feb 2021 20:21)  T(F): 98.3 (15 Feb 2021 04:00), Max: 99 (14 Feb 2021 20:21)  HR: 72 (15 Feb 2021 04:00) (62 - 90)  BP: 143/71 (15 Feb 2021 04:00) (116/73 - 143/71)  BP(mean): --  RR: 19 (15 Feb 2021 04:00) (17 - 19)  SpO2: 92% (15 Feb 2021 04:00) (92% - 95%)    T(C): 36.8 (02-15-21 @ 04:00), Max: 37.2 (02-14-21 @ 20:21)  T(C): 36.8 (02-15-21 @ 04:00), Max: 37.2 (02-14-21 @ 20:21)  T(C): 36.8 (02-15-21 @ 04:00), Max: 37.2 (02-14-21 @ 20:21)    PHYSICAL EXAM:  HEENT: NC atraumatic  Neck: supple  Respiratory: no accessory muscle use, breathing comfortably  Cardiovascular: distant  Gastrointestinal: normal appearing, nondistended  Extremities: no clubbing, no cyanosis,        LABS:                          14.6   8.73  )-----------( 201      ( 15 Feb 2021 07:11 )             44.1       8.73 02-15 @ 07:11  8.53 02-14 @ 08:17  7.85 02-13 @ 08:01  7.30 02-12 @ 06:45  7.00 02-11 @ 07:11  6.34 02-10 @ 10:11      02-15    140  |  101  |  22  ----------------------------<  204<H>  4.4   |  32<H>  |  1.40<H>    Ca    9.3      15 Feb 2021 07:11    TPro  7.3  /  Alb  3.4  /  TBili  0.7  /  DBili  x   /  AST  17  /  ALT  18  /  AlkPhos  117  02-15      Creatinine, Serum: 1.40 mg/dL (02-15-21 @ 07:11)  Creatinine, Serum: 1.20 mg/dL (02-14-21 @ 08:17)  Creatinine, Serum: 1.20 mg/dL (02-13-21 @ 08:00)  Creatinine, Serum: 1.20 mg/dL (02-12-21 @ 06:45)  Creatinine, Serum: 1.10 mg/dL (02-11-21 @ 07:11)  Creatinine, Serum: 1.30 mg/dL (02-10-21 @ 10:11)                INFLAMMATORY MARKERS  Auto Neutrophil #: 5.96 K/uL (02-15-21 @ 07:11)  Auto Lymphocyte #: 1.64 K/uL (02-15-21 @ 07:11)  Auto Neutrophil #: 6.01 K/uL (02-14-21 @ 08:17)  Auto Lymphocyte #: 1.47 K/uL (02-14-21 @ 08:17)  Auto Neutrophil #: 5.61 K/uL (02-13-21 @ 08:01)  Auto Lymphocyte #: 1.24 K/uL (02-13-21 @ 08:01)  Auto Neutrophil #: 4.77 K/uL (02-11-21 @ 07:11)  Auto Lymphocyte #: 1.26 K/uL (02-11-21 @ 07:11)  Auto Neutrophil #: 4.71 K/uL (02-10-21 @ 10:11)  Auto Lymphocyte #: 0.83 K/uL (02-10-21 @ 10:11)    Lactate, Blood: 0.9 mmol/L (02-10-21 @ 10:09)    Auto Eosinophil #: 0.38 K/uL (02-15-21 @ 07:11)  Auto Eosinophil #: 0.35 K/uL (02-14-21 @ 08:17)  Auto Eosinophil #: 0.33 K/uL (02-13-21 @ 08:01)  Auto Eosinophil #: 0.34 K/uL (02-11-21 @ 07:11)  Auto Eosinophil #: 0.26 K/uL (02-10-21 @ 10:11)                        Activated Partial Thromboplastin Time: 30.5 sec (02-10-21 @ 10:11)  INR: 0.96 ratio (02-10-21 @ 10:11)          MICROBIOLOGY:              RADIOLOGY & ADDITIONAL STUDIES:

## 2021-02-15 NOTE — DIETITIAN INITIAL EVALUATION ADULT. - PROBLEM SELECTOR PLAN 1
- Rt foot  3rd distal phalanx  open wound  probe to bone  c/w with OM   -, afebrile, no leukocytosis   -admit to [facility]  -s/p vancomycin, Rocephin, IV fluid bolus in ED  -continue Rocephin 2g IV daily Vanco -stopped,   -Performed sharp excisional debridement of right third digit wound, b/l hallux HPK lesion with a sterile #15 blade to the level of healthy subcutaneous tissue.  Right 3rd digit wound noted with purulent drainage, sent tissue for culture.    Pt booked for the OR tomorrow 2/11 for right 3rd distal digit amputation with Dr. Araiza.  Please acquire medical and cardiac clearance.  Vascular consulted for clearance and possible workup.     -XR R Foot: cortical erosions of right 3rd distal phalanx, however, no discrete om   -Podiatry Dr. Araiza consulted; scheduled for OR tomorrow 2/11 for right 3rd distal digit amputation  -Right 3rd digit wound noted with purulent drainage, tissue sent for cx by podiatry  -sharp excisional debridement of right third digit wound, b/l hallux HPK lesion performed by podiatry  -NPO past midnight   -pt is medically optimized, intermediate risk for intermediate risk procedure, able to achieve 4 METS  -cleared by vascular surgery for planned procedure   -pending cardiac clearance

## 2021-02-16 ENCOUNTER — TRANSCRIPTION ENCOUNTER (OUTPATIENT)
Age: 56
End: 2021-02-16

## 2021-02-16 VITALS
DIASTOLIC BLOOD PRESSURE: 81 MMHG | RESPIRATION RATE: 20 BRPM | SYSTOLIC BLOOD PRESSURE: 133 MMHG | HEART RATE: 67 BPM | OXYGEN SATURATION: 93 % | TEMPERATURE: 98 F

## 2021-02-16 LAB
ANION GAP SERPL CALC-SCNC: 5 MMOL/L — SIGNIFICANT CHANGE UP (ref 5–17)
BUN SERPL-MCNC: 20 MG/DL — SIGNIFICANT CHANGE UP (ref 7–23)
CALCIUM SERPL-MCNC: 9.2 MG/DL — SIGNIFICANT CHANGE UP (ref 8.5–10.1)
CHLORIDE SERPL-SCNC: 102 MMOL/L — SIGNIFICANT CHANGE UP (ref 96–108)
CO2 SERPL-SCNC: 33 MMOL/L — HIGH (ref 22–31)
CREAT SERPL-MCNC: 1.2 MG/DL — SIGNIFICANT CHANGE UP (ref 0.5–1.3)
CULTURE RESULTS: SIGNIFICANT CHANGE UP
GLUCOSE SERPL-MCNC: 175 MG/DL — HIGH (ref 70–99)
HCT VFR BLD CALC: 44 % — SIGNIFICANT CHANGE UP (ref 39–50)
HGB BLD-MCNC: 14.5 G/DL — SIGNIFICANT CHANGE UP (ref 13–17)
MCHC RBC-ENTMCNC: 29.2 PG — SIGNIFICANT CHANGE UP (ref 27–34)
MCHC RBC-ENTMCNC: 33 GM/DL — SIGNIFICANT CHANGE UP (ref 32–36)
MCV RBC AUTO: 88.7 FL — SIGNIFICANT CHANGE UP (ref 80–100)
NRBC # BLD: 0 /100 WBCS — SIGNIFICANT CHANGE UP (ref 0–0)
PLATELET # BLD AUTO: 213 K/UL — SIGNIFICANT CHANGE UP (ref 150–400)
POTASSIUM SERPL-MCNC: 4.5 MMOL/L — SIGNIFICANT CHANGE UP (ref 3.5–5.3)
POTASSIUM SERPL-SCNC: 4.5 MMOL/L — SIGNIFICANT CHANGE UP (ref 3.5–5.3)
RBC # BLD: 4.96 M/UL — SIGNIFICANT CHANGE UP (ref 4.2–5.8)
RBC # FLD: 14.3 % — SIGNIFICANT CHANGE UP (ref 10.3–14.5)
SODIUM SERPL-SCNC: 140 MMOL/L — SIGNIFICANT CHANGE UP (ref 135–145)
SPECIMEN SOURCE: SIGNIFICANT CHANGE UP
SURGICAL PATHOLOGY STUDY: SIGNIFICANT CHANGE UP
WBC # BLD: 8.28 K/UL — SIGNIFICANT CHANGE UP (ref 3.8–10.5)
WBC # FLD AUTO: 8.28 K/UL — SIGNIFICANT CHANGE UP (ref 3.8–10.5)

## 2021-02-16 PROCEDURE — 99232 SBSQ HOSP IP/OBS MODERATE 35: CPT

## 2021-02-16 RX ORDER — CEPHALEXIN 500 MG
500 CAPSULE ORAL
Refills: 0 | Status: DISCONTINUED | OUTPATIENT
Start: 2021-02-17 | End: 2021-02-16

## 2021-02-16 RX ORDER — CEPHALEXIN 500 MG
1 CAPSULE ORAL
Qty: 32 | Refills: 0
Start: 2021-02-16 | End: 2021-02-23

## 2021-02-16 RX ORDER — INSULIN GLARGINE 100 [IU]/ML
50 INJECTION, SOLUTION SUBCUTANEOUS AT BEDTIME
Refills: 0 | Status: DISCONTINUED | OUTPATIENT
Start: 2021-02-16 | End: 2021-02-16

## 2021-02-16 RX ADMIN — GABAPENTIN 300 MILLIGRAM(S): 400 CAPSULE ORAL at 05:10

## 2021-02-16 RX ADMIN — HEPARIN SODIUM 5000 UNIT(S): 5000 INJECTION INTRAVENOUS; SUBCUTANEOUS at 13:08

## 2021-02-16 RX ADMIN — Medication 15 UNIT(S): at 07:46

## 2021-02-16 RX ADMIN — LOSARTAN POTASSIUM 25 MILLIGRAM(S): 100 TABLET, FILM COATED ORAL at 05:10

## 2021-02-16 RX ADMIN — GENTAMICIN SULFATE 1 DROP(S): 3 SOLUTION/ DROPS OPHTHALMIC at 05:10

## 2021-02-16 RX ADMIN — Medication 0.1 MILLIGRAM(S): at 05:10

## 2021-02-16 RX ADMIN — Medication 4: at 07:45

## 2021-02-16 RX ADMIN — GENTAMICIN SULFATE 1 DROP(S): 3 SOLUTION/ DROPS OPHTHALMIC at 12:00

## 2021-02-16 RX ADMIN — Medication 1 TABLET(S): at 12:00

## 2021-02-16 RX ADMIN — Medication 75 MILLIGRAM(S): at 05:10

## 2021-02-16 RX ADMIN — AZATHIOPRINE 100 MILLIGRAM(S): 100 TABLET ORAL at 12:00

## 2021-02-16 RX ADMIN — FAMOTIDINE 20 MILLIGRAM(S): 10 INJECTION INTRAVENOUS at 12:00

## 2021-02-16 RX ADMIN — CEFTRIAXONE 100 MILLIGRAM(S): 500 INJECTION, POWDER, FOR SOLUTION INTRAMUSCULAR; INTRAVENOUS at 13:08

## 2021-02-16 RX ADMIN — HEPARIN SODIUM 5000 UNIT(S): 5000 INJECTION INTRAVENOUS; SUBCUTANEOUS at 05:09

## 2021-02-16 RX ADMIN — Medication 25 MILLIGRAM(S): at 13:08

## 2021-02-16 RX ADMIN — Medication 25 MILLIGRAM(S): at 05:10

## 2021-02-16 RX ADMIN — TACROLIMUS 1.5 MILLIGRAM(S): 5 CAPSULE ORAL at 09:41

## 2021-02-16 NOTE — PROGRESS NOTE ADULT - PROVIDER SPECIALTY LIST ADULT
Cardiology
Infectious Disease
Anesthesia
Cardiology
Infectious Disease
Podiatry
Podiatry
Cardiology
Endocrinology
Infectious Disease
Internal Medicine
Cardiology
Endocrinology
Nephrology
Nephrology
Podiatry
Hospitalist
Nephrology
Internal Medicine
Nephrology
Internal Medicine
Hospitalist
Internal Medicine

## 2021-02-16 NOTE — PROGRESS NOTE ADULT - PROBLEM SELECTOR PLAN 1
increase lantus 50 units qhs  cont admelog 15 units 3x/day before meals  cont mod dose admelog scale coverage qac/qhs  cont cons cho diet  goal bg 100-180 in hosp setting

## 2021-02-16 NOTE — PROGRESS NOTE ADULT - SUBJECTIVE AND OBJECTIVE BOX
Patient is a 55y Male whom presented to the hospital with s/p End-stage renal failure with renal transplant  12/2013      PAST MEDICAL & SURGICAL HISTORY:  Recurrent squamous cell carcinoma of skin  nose, L arm    History of DVT (deep vein thrombosis)    Squamous cell carcinoma of skin of left ear    CVA (cerebral vascular accident)    Obesity (BMI 30-39.9)    Diabetic peripheral neuropathy    CKD (chronic kidney disease)    Diabetes mellitus    Hyperlipidemia    Hypertension    S/P kidney transplant    End-stage renal failure with renal transplant  12/2013    H/O foot surgery  2005 - right foot - bone fracture - s/p ORIF and subsequent removal of hardware    Vasectomy status  s/p b/l  vasectomy    Ear disease  Left inner ear surgery    Toe infection  2007 L 4th Toe surgery-amputation secondary to osteomyelitis        MEDICATIONS  (STANDING):  atorvastatin 10 milliGRAM(s) Oral at bedtime  azaTHIOprine 100 milliGRAM(s) Oral daily  cefTRIAXone   IVPB 2000 milliGRAM(s) IV Intermittent every 24 hours  cloNIDine 0.1 milliGRAM(s) Oral two times a day  dextrose 40% Gel 15 Gram(s) Oral once  dextrose 5%. 1000 milliLiter(s) (50 mL/Hr) IV Continuous <Continuous>  dextrose 5%. 1000 milliLiter(s) (100 mL/Hr) IV Continuous <Continuous>  dextrose 50% Injectable 25 Gram(s) IV Push once  dextrose 50% Injectable 12.5 Gram(s) IV Push once  dextrose 50% Injectable 25 Gram(s) IV Push once  famotidine    Tablet 20 milliGRAM(s) Oral daily  gabapentin 300 milliGRAM(s) Oral two times a day  glucagon  Injectable 1 milliGRAM(s) IntraMuscular once  heparin   Injectable 5000 Unit(s) IV Push once  hydrALAZINE 25 milliGRAM(s) Oral three times a day  insulin glargine Injectable (LANTUS) 25 Unit(s) SubCutaneous at bedtime  insulin lispro (ADMELOG) corrective regimen sliding scale   SubCutaneous three times a day before meals  insulin lispro (ADMELOG) corrective regimen sliding scale   SubCutaneous at bedtime  insulin lispro Injectable (ADMELOG) 8 Unit(s) SubCutaneous three times a day before meals  losartan 25 milliGRAM(s) Oral daily  metoprolol tartrate Oral Tab/Cap - Peds 75 milliGRAM(s) Oral two times a day  tacrolimus 1.5 milliGRAM(s) Oral two times a day  trimethoprim   80 mG/sulfamethoxazole 400 mG 1 Tablet(s) Oral daily      Allergies    No Known Allergies    Intolerances        SOCIAL HISTORY:  Denies ETOh,Smoking,     FAMILY HISTORY:  FH: skin cancer    Family history of diabetes mellitus (DM)        REVIEW OF SYSTEMS:  CONSTITUTIONAL: No weakness, fevers or chills  NECK: No pain or stiffness  RESPIRATORY: No cough, wheezing, hemoptysis; No shortness of breath  CARDIOVASCULAR: No chest pain or palpitations  GASTROINTESTINAL: No abdominal or epigastric pain. No nausea, vomiting,     No diarrhea or constipation. No melena   GENITOURINARY: No dysuria, frequency or hematuria                                                                                 14.5   8.28  )-----------( 213      ( 16 Feb 2021 06:48 )             44.0       CBC Full  -  ( 16 Feb 2021 06:48 )  WBC Count : 8.28 K/uL  RBC Count : 4.96 M/uL  Hemoglobin : 14.5 g/dL  Hematocrit : 44.0 %  Platelet Count - Automated : 213 K/uL  Mean Cell Volume : 88.7 fl  Mean Cell Hemoglobin : 29.2 pg  Mean Cell Hemoglobin Concentration : 33.0 gm/dL  Auto Neutrophil # : x  Auto Lymphocyte # : x  Auto Monocyte # : x  Auto Eosinophil # : x  Auto Basophil # : x  Auto Neutrophil % : x  Auto Lymphocyte % : x  Auto Monocyte % : x  Auto Eosinophil % : x  Auto Basophil % : x      02-16    140  |  102  |  20  ----------------------------<  175<H>  4.5   |  33<H>  |  1.20    Ca    9.2      16 Feb 2021 06:48    TPro  7.3  /  Alb  3.4  /  TBili  0.7  /  DBili  x   /  AST  17  /  ALT  18  /  AlkPhos  117  02-15      CAPILLARY BLOOD GLUCOSE      POCT Blood Glucose.: 135 mg/dL (16 Feb 2021 11:34)  POCT Blood Glucose.: 214 mg/dL (16 Feb 2021 07:16)  POCT Blood Glucose.: 180 mg/dL (15 Feb 2021 21:47)      Vital Signs Last 24 Hrs  T(C): 36.8 (16 Feb 2021 12:38), Max: 37.6 (15 Feb 2021 20:53)  T(F): 98.3 (16 Feb 2021 12:38), Max: 99.6 (15 Feb 2021 20:53)  HR: 67 (16 Feb 2021 12:38) (65 - 75)  BP: 133/81 (16 Feb 2021 12:38) (118/74 - 133/81)  BP(mean): --  RR: 20 (16 Feb 2021 12:38) (18 - 20)  SpO2: 93% (16 Feb 2021 12:38) (92% - 96%)              PHYSICAL EXAM:    Constitutional: NAD  HEENT: conjunctive   clear   Neck:  No JVD  Respiratory: CTAB  Cardiovascular: S1 and S2  Gastrointestinal: BS+, soft,   Neurological: A/O x 3, no focal deficits  Skin:  R foot 3rd toe infection, r shin redness

## 2021-02-16 NOTE — PROGRESS NOTE ADULT - PROBLEM SELECTOR PLAN 1
- Rt foot 3rd distal phalanx  open wound  probe to bone  c/w with OM , Abnormal   - afebrile, no leukocytosis   -XR R Foot: cortical erosions of right 3rd distal phalanx, however, no discrete om   - Tissue cx growing Staphylococcus lugdunensis, blood cultures negative  -s/p right 3rd distal digit amputation with Dr. Araiza, pathology margins negative for osteomyelitis  -Patient to be d/c home on PO Keflex  -WBAT with surgical shoe  -PT eval; no skilled PT needed - Rt foot 3rd distal phalanx  open wound  probe to bone  c/w with OM , Abnormal   - afebrile, no leukocytosis   -XR R Foot: cortical erosions of right 3rd distal phalanx, however, no discrete om   - Tissue cx growing Staphylococcus lugdunensis, repeat cultures neg  - blood cultures negative final  -s/p right 3rd distal digit amputation with Dr. Araiza, pathology margins negative for osteomyelitis  -Patient to be d/c home on PO Keflex 500mg 4x daily until 2/24/2021  -WBAT with surgical shoe  -PT eval; no skilled PT needed - Rt foot 3rd distal phalanx  open wound  probe to bone  c/w with OM , Abnormal   - afebrile, no leukocytosis   -XR R Foot: cortical erosions of right 3rd distal phalanx, however, no discrete om   - Tissue cx growing Staphylococcus lugdunensis, repeat cultures neg  - blood cultures negative final  -s/p right 3rd distal digit amputation with Dr. Araiza, pathology  Clean margins negative for osteomyelitis, Give IV Rocephin dose today prior to D/C  -Patient to be d/c home on PO Keflex 500mg 4x daily until 2/24/2021  -WBAT with surgical shoe  -PT eval; no skilled PT needed

## 2021-02-16 NOTE — PROGRESS NOTE ADULT - PROBLEM SELECTOR PLAN 8
Heparin 5000u subq q8h for DVT ppx     IMPROVE VTE Individual Risk Assessment        RISK                                                          Points  [  ] Previous VTE                                                3  [  ] Thrombophilia                                             2  [  ] Lower limb paralysis                                   2        (unable to hold up >15 seconds)    [  ] Current Cancer                                            2         (within 6 months)  [  ] Immobilization > 24 hrs                              1  [  ] ICU/CCU stay > 24 hours                            1  [  ] Age > 60                                                    1  IMPROVE VTE Score _____0____
SCDs for DVT ppx     IMPROVE VTE Individual Risk Assessment        RISK                                                          Points  [  ] Previous VTE                                                3  [  ] Thrombophilia                                             2  [  ] Lower limb paralysis                                   2        (unable to hold up >15 seconds)    [  ] Current Cancer                                            2         (within 6 months)  [  ] Immobilization > 24 hrs                              1  [  ] ICU/CCU stay > 24 hours                            1  [  ] Age > 60                                                    1  IMPROVE VTE Score _____0____
Heparin 5000u subq q8h for DVT ppx     IMPROVE VTE Individual Risk Assessment        RISK                                                          Points  [  ] Previous VTE                                                3  [  ] Thrombophilia                                             2  [  ] Lower limb paralysis                                   2        (unable to hold up >15 seconds)    [  ] Current Cancer                                            2         (within 6 months)  [  ] Immobilization > 24 hrs                              1  [  ] ICU/CCU stay > 24 hours                            1  [  ] Age > 60                                                    1  IMPROVE VTE Score _____0____
SCDs for DVT ppx     IMPROVE VTE Individual Risk Assessment        RISK                                                          Points  [  ] Previous VTE                                                3  [  ] Thrombophilia                                             2  [  ] Lower limb paralysis                                   2        (unable to hold up >15 seconds)    [  ] Current Cancer                                            2         (within 6 months)  [  ] Immobilization > 24 hrs                              1  [  ] ICU/CCU stay > 24 hours                            1  [  ] Age > 60                                                    1  IMPROVE VTE Score _____0____

## 2021-02-16 NOTE — PROGRESS NOTE ADULT - PROBLEM SELECTOR PROBLEM 3
HTN (hypertension)
Diabetes mellitus
Diabetes mellitus
HTN (hypertension)
HTN (hypertension)
Diabetes mellitus
HTN (hypertension)
HTN (hypertension)
Diabetes mellitus
HTN (hypertension)
Diabetes mellitus
Diabetes mellitus

## 2021-02-16 NOTE — PROGRESS NOTE ADULT - SUBJECTIVE AND OBJECTIVE BOX
CAPILLARY BLOOD GLUCOSE      POCT Blood Glucose.: 214 mg/dL (16 Feb 2021 07:16)  POCT Blood Glucose.: 180 mg/dL (15 Feb 2021 21:47)  POCT Blood Glucose.: 167 mg/dL (15 Feb 2021 16:39)  POCT Blood Glucose.: 250 mg/dL (15 Feb 2021 11:51)      Vital Signs Last 24 Hrs  T(C): 36.7 (16 Feb 2021 04:58), Max: 37.6 (15 Feb 2021 20:53)  T(F): 98.1 (16 Feb 2021 04:58), Max: 99.6 (15 Feb 2021 20:53)  HR: 65 (16 Feb 2021 04:58) (65 - 75)  BP: 129/74 (16 Feb 2021 04:58) (118/74 - 148/84)  BP(mean): --  RR: 19 (16 Feb 2021 04:58) (18 - 20)  SpO2: 92% (16 Feb 2021 04:58) (91% - 96%)    General: WN/WD NAD  Respiratory: CTA B/L  CV: RRR, S1S2, no murmurs, rubs or gallops  Abdominal: Soft, NT, ND +BS, Last BM  Extremities: le foot dsg intact     02-16    140  |  102  |  20  ----------------------------<  175<H>  4.5   |  33<H>  |  1.20    Ca    9.2      16 Feb 2021 06:48    TPro  7.3  /  Alb  3.4  /  TBili  0.7  /  DBili  x   /  AST  17  /  ALT  18  /  AlkPhos  117  02-15      atorvastatin 10 milliGRAM(s) Oral at bedtime  dextrose 40% Gel 15 Gram(s) Oral once  dextrose 50% Injectable 25 Gram(s) IV Push once  dextrose 50% Injectable 25 Gram(s) IV Push once  dextrose 50% Injectable 12.5 Gram(s) IV Push once  glucagon  Injectable 1 milliGRAM(s) IntraMuscular once  insulin glargine Injectable (LANTUS) 45 Unit(s) SubCutaneous at bedtime  insulin lispro (ADMELOG) corrective regimen sliding scale   SubCutaneous three times a day before meals  insulin lispro (ADMELOG) corrective regimen sliding scale   SubCutaneous at bedtime  insulin lispro Injectable (ADMELOG) 15 Unit(s) SubCutaneous three times a day before meals

## 2021-02-16 NOTE — PROGRESS NOTE ADULT - SUBJECTIVE AND OBJECTIVE BOX
Patient is a 55y old  Male who presents with a chief complaint of osteomyelitis (16 Feb 2021 10:01)    HPI:   54 YO M with hx CKD s/p renal transplant, CVA, DM, neuropathy, DVT, HLD, Hypertension ,Obesity, squamous cell carcinoma, presents to ED for redness/swelling R leg. Patient states 3 days ago, he first noticed his toe nail on the R 3rd digit starting to "lift up" from a "small pimple" underneath. States there was also clear/red oozing. Patient made an appointment with Dr. Araiza for Friday of this week. Yesterday patient developed redness and swelling in the R lower leg and had blood sugars in the 300s which prompted him to come to the ED. Patient has peripheral neuropathy and states he has no pain in his legs. Denies any fever, chills, SOB, CP, N/V.     Of note, patient was admitted to [facility] last August for osteomyelitis and had left 3rd toe distal digit partial amputation and right foot distal digit 2nd digit on 8/27/20.       In the ED:   VS: T 98F, HR 76, /75, RR 16, SpO2 96% on RA  Labs: glucose 361, alk phos 147, UA neg, covid-19 neg  US duplex venous LE: neg for DVT   EKG: Sinus bradycardia with 1st degree AV block (unchanged from 8/2020)  XR R foot: cortical erosions of right 3rd distal phalanx  CXR: pending official read     S/p Vancomycin 1g IV, Rocephin 2g IV, 1L NS IV bolus  (10 Feb 2021 15:11)    INTERVAL HPI:  2/11/21 - Pt seen and examined at bedside, s/p partial amputation of third digit of right foot. Patient states he feels well, no complaints. Erythema and swelling on RLE are improving on rocephin.     2/12/21 - Pt seen and examined at bedside. POD#1, remains afebrile, VSS. Patient offers no complaints. Improved blood glucose today. Continue rocephin for RLE cellulitis. Pending OR tissue cx, path. On IV Antibiotics.    2/13/21 - Pt seen and examined at bedside. POD#2, no events overnight. Tissue cx growing Staphylococcus lugdunensis. Continue Rocephin 2g IV     2/1421: Pt seen, examined  No acute events overnight. Pt seen and examined at bedside. Pt states he felt nausea last night, resolved with ginger ale no further nausea after then. Pt states he feels well, denies LE pain. Pt admits to chronic LE numbness. Pt denies fever chills, CP, SOB, abdominal pain, n/v/c/d, edema.    2/15/21: Pt seen, examined, OOB to chair, NO Complaint, On IV Abx awaiting Bone Pathology. Ambulating,    INTERVAL HPI: 2/16/21: No acute events overnight. Patient seen and examined at bedside. Patient feeling well. States his RLE erythema and swelling has improved drastically since being admitted. Patient awaiting his pathology results s/p amputation. Patient OOB to chair, ambulating, feeling well.         Home Medications:  aspirin 325 mg oral tablet: 1 tab(s) orally once a day (10 Feb 2021 15:40)  azaTHIOprine 100 mg oral tablet: 1 tab(s) orally once a day (10 Feb 2021 15:40)  cloNIDine 0.1 mg oral tablet: 1 tab(s) orally 2 times a day (10 Feb 2021 15:40)  famotidine 20 mg oral tablet: 1 tab(s) orally once a day (10 Feb 2021 15:40)  gabapentin 300 mg oral capsule: 1 cap(s) orally 2 times a day (10 Feb 2021 15:40)  HumaLOG 100 units/mL injectable solution: 3 unit(s) injectable every hour via insulin pump. TDD: 72 units. (10 Feb 2021 15:40)  hydrALAZINE 25 mg oral tablet: 1 tab(s) orally 3 times a day (10 Feb 2021 15:40)  losartan 25 mg oral tablet: 1 tab(s) orally once a day (10 Feb 2021 15:40)  lovastatin 40 mg oral tablet: 1 tab(s) orally once a day (10 Feb 2021 15:40)  Metoprolol Tartrate 25 mg oral tablet: 3 tab(s) orally 2 times a day    *conf. with patient and pharmacy. 75mg dose, BID* (10 Feb 2021 15:40)  sulfamethoxazole-trimethoprim 400 mg-80 mg oral tablet: 1 tab(s) orally once a day (10 Feb 2021 15:40)  tacrolimus: 1.5 milligram(s) orally 2 times a day (10 Feb 2021 15:40)      MEDICATIONS  (STANDING):  atorvastatin 10 milliGRAM(s) Oral at bedtime  azaTHIOprine 100 milliGRAM(s) Oral daily  cefTRIAXone   IVPB 2000 milliGRAM(s) IV Intermittent every 24 hours  cloNIDine 0.1 milliGRAM(s) Oral two times a day  dextrose 40% Gel 15 Gram(s) Oral once  dextrose 5%. 1000 milliLiter(s) (50 mL/Hr) IV Continuous <Continuous>  dextrose 5%. 1000 milliLiter(s) (100 mL/Hr) IV Continuous <Continuous>  dextrose 50% Injectable 25 Gram(s) IV Push once  dextrose 50% Injectable 25 Gram(s) IV Push once  dextrose 50% Injectable 12.5 Gram(s) IV Push once  famotidine    Tablet 20 milliGRAM(s) Oral daily  gabapentin 300 milliGRAM(s) Oral two times a day  gentamicin 0.3% Ophthalmic Solution 1 Drop(s) Right EYE four times a day  glucagon  Injectable 1 milliGRAM(s) IntraMuscular once  heparin   Injectable 5000 Unit(s) SubCutaneous every 8 hours  hydrALAZINE 25 milliGRAM(s) Oral three times a day  insulin glargine Injectable (LANTUS) 50 Unit(s) SubCutaneous at bedtime  insulin lispro (ADMELOG) corrective regimen sliding scale   SubCutaneous three times a day before meals  insulin lispro (ADMELOG) corrective regimen sliding scale   SubCutaneous at bedtime  insulin lispro Injectable (ADMELOG) 15 Unit(s) SubCutaneous three times a day before meals  losartan 25 milliGRAM(s) Oral daily  metoprolol tartrate 75 milliGRAM(s) Oral two times a day  sodium chloride 0.45%. 1000 milliLiter(s) (50 mL/Hr) IV Continuous <Continuous>  tacrolimus 1.5 milliGRAM(s) Oral two times a day  trimethoprim   80 mG/sulfamethoxazole 400 mG 1 Tablet(s) Oral daily    MEDICATIONS  (PRN):  melatonin 5 milliGRAM(s) Oral at bedtime PRN Insomnia      No Known Allergies      Social History:  , lives with wife and 3 kids, retired, social drinker, denies tobacco or recreational drug use, independent in ADLs, walks without assistance (10 Feb 2021 15:11)      REVIEW OF SYSTEMS:  CONSTITUTIONAL: No fever, No chills, No fatigue, No myalgia, No Body ache, No Weakness  EYES: No eye pain,  No visual disturbances, No discharge, No Redness  ENMT: No ear pain, No nose bleed, No vertigo; No sinus pain, No throat pain, No Congestion  NECK: No pain, No stiffness  RESPIRATORY: No cough, No wheezing, No hemoptysis, No shortness of breath  CARDIOVASCULAR: No chest pain, No palpitations  GASTROINTESTINAL: No abdominal pain, No epigastric pain. No nausea, No vomiting, No diarrhea, No constipation; [  ] BM  GENITOURINARY: No dysuria, No frequency, No urgency, No hematuria, No incontinence  NEUROLOGICAL: No headaches, No dizziness, No numbness, No tingling, No tremors, No weakness  EXTREMITIES: RLE swelling, erythema improved, RLE wrapped in bandage  SKIN: [ x ] No new itching, burning, rashes, or lesions   MUSCULOSKELETAL: No joint pain, No joint swelling; No muscle pain, No back pain, No extremity pain  PSYCHIATRIC: No depression, No anxiety, No mood swings, No difficulty sleeping at night  PAIN SCALE: [ x ] None  [  ] Other-  ROS Unable to obtain due to: [  ] Dementia  [  ] Lethargy  [  ] Sedated  [  ] Non verbal  REST OF REVIEW OF SYSTEMS: [ x ] Normal     Vital Signs Last 24 Hrs  T(C): 36.7 (16 Feb 2021 04:58), Max: 37.6 (15 Feb 2021 20:53)  T(F): 98.1 (16 Feb 2021 04:58), Max: 99.6 (15 Feb 2021 20:53)  HR: 65 (16 Feb 2021 04:58) (65 - 75)  BP: 129/74 (16 Feb 2021 04:58) (118/74 - 148/84)  BP(mean): --  RR: 19 (16 Feb 2021 04:58) (18 - 20)  SpO2: 92% (16 Feb 2021 04:58) (91% - 96%)    CAPILLARY BLOOD GLUCOSE      POCT Blood Glucose.: 214 mg/dL (16 Feb 2021 07:16)  POCT Blood Glucose.: 180 mg/dL (15 Feb 2021 21:47)  POCT Blood Glucose.: 167 mg/dL (15 Feb 2021 16:39)  POCT Blood Glucose.: 250 mg/dL (15 Feb 2021 11:51)      I&O's Summary    15 Feb 2021 07:01  -  16 Feb 2021 07:00  --------------------------------------------------------  IN: 375 mL / OUT: 200 mL / NET: 175 mL      PHYSICAL EXAM:  GENERAL:  [ x ] NAD, [ x ] Well appearing, [  ] Agitated, [  ] Drowsy, [  ] Lethargy, [  ] Confused   HEAD:  [x  ] Normal, [  ] Other  EYES:  [x  ] EOMI, [ x ] PERRLA, [  x] Conjunctiva and sclera clear normal, [  ] Other, [  ] Pallor, [  ] Discharge  ENMT:  [  ] Normal, [x  ] Moist mucous membranes, [  ] Good dentition, [  ] No thrush  NECK:  [x  ] Supple, [ x ] No JVD, [  ] Normal thyroid, [  ] Lymphadenopathy, [  ] Other  CHEST/LUNG:  [x  ] Clear to auscultation bilaterally, [ x ] Breath Sounds equal B/L / [  ] Poor effort, [ x ] No rales, [ x ] No rhonchi, [  x] No wheezing  HEART:  [x  ] Regular rate and rhythm, [  ] Tachycardia, [  ] Bradycardia, [  ] Irregular, [ x ] No murmurs, No rubs, No gallops, [  ] PPM in place (Mfr:  )  ABDOMEN:  [x  ] Soft, [x  ] Nontender, [x  ] Nondistended, [ x ] No mass, [ x ] Bowel sounds present, [  ] Obese  NERVOUS SYSTEM:  [ x ] Alert & Oriented x3, [ x ] Nonfocal, [  ] Confusion, [  ] Encephalopathic, [  ] Sedated, [  ] Unable to assess, [  ] Dementia, [  ] Other-  EXTREMITIES:  [ x ] 2+ Peripheral Pulses, No clubbing, No cyanosis, ,  [ x ] improved  Edema Rt  lower EXT, with improving erythema, swelling &  warmth  [x ] PVD stasis skin changes B/L lower EXT, [x  ] Wound dressing Rt Foot, R third digit amputation, pt has had amputations left foot toes   LYMPH:  No lymphadenopathy noted  SKIN:  [  ] No rashes or lesions, [  ] Pressure ulcers, [  ] Ecchymosis, [  ] Skin tears, [ x ] Other- erythema and edema RLE improved, wrapped in bandage    DIET: Diet, DASH/TLC:   Sodium & Cholesterol Restricted  Consistent Carbohydrate Evening Snack (02-11-21 @ 09:19)      LABS:                        14.5   8.28  )-----------( 213      ( 16 Feb 2021 06:48 )             44.0     16 Feb 2021 06:48    140    |  102    |  20     ----------------------------<  175    4.5     |  33     |  1.20     Ca    9.2        16 Feb 2021 06:48          Culture Results:   No growth (02-11 @ 12:36)  Culture Results:   No growth (02-11 @ 12:35)  Culture Results:   Numerous Staphylococcus lugdunensis  Numerous Coag Negative Staphylococcus (02-11 @ 01:25)  Culture Results:   No Growth Final (02-10 @ 16:30)  Culture Results:   No Growth Final (02-10 @ 16:30)            Culture - Tissue with Gram Stain (collected 11 Feb 2021 12:36)  Source: .Tissue Other, 3rd toe right foot head of mid  Gram Stain (11 Feb 2021 22:18):    Rare polymorphonuclear leukocytes    Rare Gram Positive Cocci per oil power field    Rare Gram Variable Rods per oil power field  Preliminary Report (12 Feb 2021 11:02):    No growth    Culture - Tissue with Gram Stain (collected 11 Feb 2021 12:35)  Source: .Tissue Other, 3rd toe right foot distal phal  Gram Stain (11 Feb 2021 22:19):    Rare polymorphonuclear leukocytes    Rare Gram positive cocci in pairs per oil power field    Rare Gram Variable Rods per oil power field  Preliminary Report (12 Feb 2021 11:03):    No growth    Culture - Tissue with Gram Stain (collected 11 Feb 2021 01:25)  Source: .Tissue Other, right foot  Gram Stain (11 Feb 2021 06:43):    No polymorphonuclear cells seen per low power field    No organisms seen per oil power field  Preliminary Report (14 Feb 2021 15:49):    Numerous Staphylococcus lugdunensis    Numerous Coag Negative Staphylococcus  Organism: Staphylococcus lugdunensis  Coag Negative Staphylococcus (14 Feb 2021 15:48)  Organism: Coag Negative Staphylococcus (14 Feb 2021 15:48)  Organism: Staphylococcus lugdunensis (13 Feb 2021 08:40)    Culture - Blood (collected 10 Feb 2021 16:30)  Source: .Blood Blood-Peripheral  Final Report (15 Feb 2021 17:00):    No Growth Final    Culture - Blood (collected 10 Feb 2021 16:30)  Source: .Blood Blood-Peripheral  Final Report (15 Feb 2021 17:00):    No Growth Final       Anemia Panel:      Thyroid Panel:                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    HTN (hypertension)  HTN (hypertension)    Need for prophylactic measure  Need for prophylactic measure    Renal transplant recipient  Renal transplant recipient    CKD (chronic kidney disease)  CKD (chronic kidney disease)    CVA (cerebral vascular accident)  CVA (cerebral vascular accident)    Diabetes mellitus  Diabetes mellitus    Cellulitis  Cellulitis    Osteomyelitis of right foot, unspecified type  Osteomyelitis of right foot, unspecified type          Consultant(s) Notes Reviewed:  [x  ] YES     Care Discussed with [ x ] Consultants, [ x ] Patient, [  ] Family, [  ] HCP, [  ] , [  ] Social Service, [  ] RN, [  ] Physical Therapy, [  ] Palliative Care Team  DVT PPX: [  ] Lovenox, [ x ] SC Heparin, [  ] Coumadin, [  ] Xarelto, [  ] Eliquis, [  ] Pradaxa, [  ] IV Heparin drip, [  ] SCD, [  ] Ambulation, [  ] Contraindicated 2/2 GI Bleed, [  ] Contraindicated 2/2  Bleed, [  ] Contraindicated 2/2 Brain Bleed  Advanced Directive: [ x ] None, [  ] DNR/DNI Patient is a 55y old  Male who presents with a chief complaint of osteomyelitis (16 Feb 2021 10:01)    HPI:   54 YO M with hx CKD s/p renal transplant, CVA, DM, neuropathy, DVT, HLD, Hypertension ,Obesity, squamous cell carcinoma, presents to ED for redness/swelling R leg. Patient states 3 days ago, he first noticed his toe nail on the R 3rd digit starting to "lift up" from a "small pimple" underneath. States there was also clear/red oozing. Patient made an appointment with Dr. Araiza for Friday of this week. Yesterday patient developed redness and swelling in the R lower leg and had blood sugars in the 300s which prompted him to come to the ED. Patient has peripheral neuropathy and states he has no pain in his legs. Denies any fever, chills, SOB, CP, N/V.     Of note, patient was admitted to [facility] last August for osteomyelitis and had left 3rd toe distal digit partial amputation and right foot distal digit 2nd digit on 8/27/20.       In the ED:   VS: T 98F, HR 76, /75, RR 16, SpO2 96% on RA  Labs: glucose 361, alk phos 147, UA neg, covid-19 neg  US duplex venous LE: neg for DVT   EKG: Sinus bradycardia with 1st degree AV block (unchanged from 8/2020)  XR R foot: cortical erosions of right 3rd distal phalanx  CXR: pending official read     S/p Vancomycin 1g IV, Rocephin 2g IV, 1L NS IV bolus  (10 Feb 2021 15:11)    INTERVAL HPI:  2/11/21 - Pt seen and examined at bedside, s/p partial amputation of third digit of right foot. Patient states he feels well, no complaints. Erythema and swelling on RLE are improving on rocephin.     2/12/21 - Pt seen and examined at bedside. POD#1, remains afebrile, VSS. Patient offers no complaints. Improved blood glucose today. Continue rocephin for RLE cellulitis. Pending OR tissue cx, path. On IV Antibiotics.    2/13/21 - Pt seen and examined at bedside. POD#2, no events overnight. Tissue cx growing Staphylococcus lugdunensis. Continue Rocephin 2g IV     2/1421: Pt seen, examined  No acute events overnight. Pt seen and examined at bedside. Pt states he felt nausea last night, resolved with ginger ale no further nausea after then. Pt states he feels well, denies LE pain. Pt admits to chronic LE numbness. Pt denies fever chills, CP, SOB, abdominal pain, n/v/c/d, edema.    2/15/21: Pt seen, examined, OOB to chair, NO Complaint, On IV Abx awaiting Bone Pathology. Ambulating,    INTERVAL HPI: 2/16/21: No acute events overnight. Patient seen and examined at bedside. Patient feeling well. States his RLE erythema and swelling has improved drastically since being admitted. Patient awaiting his pathology results s/p amputation. Patient OOB to chair, ambulating, feeling well. D/C Plan home today        Home Medications:  aspirin 325 mg oral tablet: 1 tab(s) orally once a day (10 Feb 2021 15:40)  azaTHIOprine 100 mg oral tablet: 1 tab(s) orally once a day (10 Feb 2021 15:40)  cloNIDine 0.1 mg oral tablet: 1 tab(s) orally 2 times a day (10 Feb 2021 15:40)  famotidine 20 mg oral tablet: 1 tab(s) orally once a day (10 Feb 2021 15:40)  gabapentin 300 mg oral capsule: 1 cap(s) orally 2 times a day (10 Feb 2021 15:40)  HumaLOG 100 units/mL injectable solution: 3 unit(s) injectable every hour via insulin pump. TDD: 72 units. (10 Feb 2021 15:40)  hydrALAZINE 25 mg oral tablet: 1 tab(s) orally 3 times a day (10 Feb 2021 15:40)  losartan 25 mg oral tablet: 1 tab(s) orally once a day (10 Feb 2021 15:40)  lovastatin 40 mg oral tablet: 1 tab(s) orally once a day (10 Feb 2021 15:40)  Metoprolol Tartrate 25 mg oral tablet: 3 tab(s) orally 2 times a day    *conf. with patient and pharmacy. 75mg dose, BID* (10 Feb 2021 15:40)  sulfamethoxazole-trimethoprim 400 mg-80 mg oral tablet: 1 tab(s) orally once a day (10 Feb 2021 15:40)  tacrolimus: 1.5 milligram(s) orally 2 times a day (10 Feb 2021 15:40)      MEDICATIONS  (STANDING):  atorvastatin 10 milliGRAM(s) Oral at bedtime  azaTHIOprine 100 milliGRAM(s) Oral daily  cefTRIAXone   IVPB 2000 milliGRAM(s) IV Intermittent every 24 hours  cloNIDine 0.1 milliGRAM(s) Oral two times a day  dextrose 40% Gel 15 Gram(s) Oral once  dextrose 5%. 1000 milliLiter(s) (50 mL/Hr) IV Continuous <Continuous>  dextrose 5%. 1000 milliLiter(s) (100 mL/Hr) IV Continuous <Continuous>  dextrose 50% Injectable 25 Gram(s) IV Push once  dextrose 50% Injectable 25 Gram(s) IV Push once  dextrose 50% Injectable 12.5 Gram(s) IV Push once  famotidine    Tablet 20 milliGRAM(s) Oral daily  gabapentin 300 milliGRAM(s) Oral two times a day  gentamicin 0.3% Ophthalmic Solution 1 Drop(s) Right EYE four times a day  glucagon  Injectable 1 milliGRAM(s) IntraMuscular once  heparin   Injectable 5000 Unit(s) SubCutaneous every 8 hours  hydrALAZINE 25 milliGRAM(s) Oral three times a day  insulin glargine Injectable (LANTUS) 50 Unit(s) SubCutaneous at bedtime  insulin lispro (ADMELOG) corrective regimen sliding scale   SubCutaneous three times a day before meals  insulin lispro (ADMELOG) corrective regimen sliding scale   SubCutaneous at bedtime  insulin lispro Injectable (ADMELOG) 15 Unit(s) SubCutaneous three times a day before meals  losartan 25 milliGRAM(s) Oral daily  metoprolol tartrate 75 milliGRAM(s) Oral two times a day  sodium chloride 0.45%. 1000 milliLiter(s) (50 mL/Hr) IV Continuous <Continuous>  tacrolimus 1.5 milliGRAM(s) Oral two times a day  trimethoprim   80 mG/sulfamethoxazole 400 mG 1 Tablet(s) Oral daily    MEDICATIONS  (PRN):  melatonin 5 milliGRAM(s) Oral at bedtime PRN Insomnia      No Known Allergies      Social History:  , lives with wife and 3 kids, retired, social drinker, denies tobacco or recreational drug use, independent in ADLs, walks without assistance (10 Feb 2021 15:11)      REVIEW OF SYSTEMS: No complaints  CONSTITUTIONAL: No fever, No chills, No fatigue, No myalgia, No Body ache, No Weakness  EYES: No eye pain,  No visual disturbances, No discharge, No Redness  ENMT: No ear pain, No nose bleed, No vertigo; No sinus pain, No throat pain, No Congestion  NECK: No pain, No stiffness  RESPIRATORY: No cough, No wheezing, No hemoptysis, No shortness of breath  CARDIOVASCULAR: No chest pain, No palpitations  GASTROINTESTINAL: No abdominal pain, No epigastric pain. No nausea, No vomiting, No diarrhea, No constipation; [  ] BM  GENITOURINARY: No dysuria, No frequency, No urgency, No hematuria, No incontinence  NEUROLOGICAL: No headaches, No dizziness, No numbness, No tingling, No tremors, No weakness  EXTREMITIES: RLE swelling, erythema improved, RLE wrapped in bandage  SKIN: [ x ] No new itching, burning, rashes, or lesions   MUSCULOSKELETAL: No joint pain, No joint swelling; No muscle pain, No back pain, No extremity pain  PSYCHIATRIC: No depression, No anxiety, No mood swings, No difficulty sleeping at night  PAIN SCALE: [ x ] None  [  ] Other-  ROS Unable to obtain due to: [  ] Dementia  [  ] Lethargy  [  ] Sedated  [  ] Non verbal  REST OF REVIEW OF SYSTEMS: [ x ] Normal     Vital Signs Last 24 Hrs  T(C): 36.7 (16 Feb 2021 04:58), Max: 37.6 (15 Feb 2021 20:53)  T(F): 98.1 (16 Feb 2021 04:58), Max: 99.6 (15 Feb 2021 20:53)  HR: 65 (16 Feb 2021 04:58) (65 - 75)  BP: 129/74 (16 Feb 2021 04:58) (118/74 - 148/84)  BP(mean): --  RR: 19 (16 Feb 2021 04:58) (18 - 20)  SpO2: 92% (16 Feb 2021 04:58) (91% - 96%)    CAPILLARY BLOOD GLUCOSE      POCT Blood Glucose.: 214 mg/dL (16 Feb 2021 07:16)  POCT Blood Glucose.: 180 mg/dL (15 Feb 2021 21:47)  POCT Blood Glucose.: 167 mg/dL (15 Feb 2021 16:39)  POCT Blood Glucose.: 250 mg/dL (15 Feb 2021 11:51)      I&O's Summary    15 Feb 2021 07:01  -  16 Feb 2021 07:00  --------------------------------------------------------  IN: 375 mL / OUT: 200 mL / NET: 175 mL      PHYSICAL EXAM:  GENERAL:  [ x ] NAD, [ x ] Well appearing, [  ] Agitated, [  ] Drowsy, [  ] Lethargy, [  ] Confused   HEAD:  [x  ] Normal, [  ] Other  EYES:  [x  ] EOMI, [ x ] PERRLA, [  x] Conjunctiva and sclera clear normal, [  ] Other, [  ] Pallor, [  ] Discharge  ENMT:  [  ] Normal, [x  ] Moist mucous membranes, [  ] Good dentition, [  ] No thrush  NECK:  [x  ] Supple, [ x ] No JVD, [ x ] Normal thyroid, [  ] Lymphadenopathy, [  ] Other  CHEST/LUNG:  [x  ] Clear to auscultation bilaterally, [ x ] Breath Sounds equal B/L / [  ] Poor effort, [ x ] No rales, [ x ] No rhonchi, [  x] No wheezing  HEART:  [x  ] Regular rate and rhythm, [  ] Tachycardia, [  ] Bradycardia, [  ] Irregular, [ x ] No murmurs, No rubs, No gallops, [  ] PPM in place (Mfr:  )  ABDOMEN:  [x  ] Soft, [x  ] Nontender, [x  ] Nondistended, [ x ] No mass, [ x ] Bowel sounds present, [  ] Obese  NERVOUS SYSTEM:  [ x ] Alert & Oriented x3, [ x ] Nonfocal, [  ] Confusion, [  ] Encephalopathic, [  ] Sedated, [  ] Unable to assess, [  ] Dementia, [  ] Other-  EXTREMITIES:  [ x ] 2+ Peripheral Pulses, No clubbing, No cyanosis, ,  [ x ] improved  Edema Rt  lower EXT, with improving erythema, swelling &  warmth  [x ] PVD stasis skin changes B/L lower EXT, [x  ] Wound dressing Rt Foot, R third digit amputation, pt has had amputations left foot toes   LYMPH:  No lymphadenopathy noted  SKIN:  [  ] No rashes or lesions, [  ] Pressure ulcers, [  ] Ecchymosis, [  ] Skin tears, [ x ] Other- erythema and edema RLE improved, wrapped in bandage    DIET: Diet, DASH/TLC:   Sodium & Cholesterol Restricted  Consistent Carbohydrate Evening Snack (02-11-21 @ 09:19)      LABS:                        14.5   8.28  )-----------( 213      ( 16 Feb 2021 06:48 )             44.0     16 Feb 2021 06:48    140    |  102    |  20     ----------------------------<  175    4.5     |  33     |  1.20     Ca    9.2        16 Feb 2021 06:48          Culture Results:   No growth (02-11 @ 12:36)  Culture Results:   No growth (02-11 @ 12:35)  Culture Results:   Numerous Staphylococcus lugdunensis  Numerous Coag Negative Staphylococcus (02-11 @ 01:25)  Culture Results:   No Growth Final (02-10 @ 16:30)  Culture Results:   No Growth Final (02-10 @ 16:30)            Culture - Tissue with Gram Stain (collected 11 Feb 2021 12:36)  Source: .Tissue Other, 3rd toe right foot head of mid  Gram Stain (11 Feb 2021 22:18):    Rare polymorphonuclear leukocytes    Rare Gram Positive Cocci per oil power field    Rare Gram Variable Rods per oil power field  Preliminary Report (12 Feb 2021 11:02):    No growth    Culture - Tissue with Gram Stain (collected 11 Feb 2021 12:35)  Source: .Tissue Other, 3rd toe right foot distal phal  Gram Stain (11 Feb 2021 22:19):    Rare polymorphonuclear leukocytes    Rare Gram positive cocci in pairs per oil power field    Rare Gram Variable Rods per oil power field  Preliminary Report (12 Feb 2021 11:03):    No growth    Culture - Tissue with Gram Stain (collected 11 Feb 2021 01:25)  Source: .Tissue Other, right foot  Gram Stain (11 Feb 2021 06:43):    No polymorphonuclear cells seen per low power field    No organisms seen per oil power field  Preliminary Report (14 Feb 2021 15:49):    Numerous Staphylococcus lugdunensis    Numerous Coag Negative Staphylococcus  Organism: Staphylococcus lugdunensis  Coag Negative Staphylococcus (14 Feb 2021 15:48)  Organism: Coag Negative Staphylococcus (14 Feb 2021 15:48)  Organism: Staphylococcus lugdunensis (13 Feb 2021 08:40)    Culture - Blood (collected 10 Feb 2021 16:30)  Source: .Blood Blood-Peripheral  Final Report (15 Feb 2021 17:00):    No Growth Final    Culture - Blood (collected 10 Feb 2021 16:30)  Source: .Blood Blood-Peripheral  Final Report (15 Feb 2021 17:00):    No Growth Final  Surgical Pathology Report (02.11.21 @ 08:30)   Surgical Pathology Report:   ACCESSION No: [id number]   LUTHER NORIEGA 2   Surgical Final Report   Final Diagnosis   1. Distal Phalanx, third toe, foot, right, amputation:   Mild chronic osteomyelitis.   Extensive bone remodeling.   Skin ulceration with acute necrotizing inflammation.   2. Head of middle phalanx, third toe, foot, right:   Viable cartilage capped bone, negative for osteomyelitis.   Verified by: Sebastian Wooten M.D.   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    HTN (hypertension)  HTN (hypertension)    Need for prophylactic measure  Need for prophylactic measure    Renal transplant recipient  Renal transplant recipient    CKD (chronic kidney disease)  CKD (chronic kidney disease)    CVA (cerebral vascular accident)  CVA (cerebral vascular accident)    Diabetes mellitus  Diabetes mellitus    Cellulitis  Cellulitis    Osteomyelitis of right foot, unspecified type  Osteomyelitis of right foot, unspecified type      Consultant(s) Notes Reviewed:  [x  ] YES     Care Discussed with [ x ] Consultants, [ x ] Patient, [  ] Family, [  ] HCP, [  ] , [  ] Social Service, [  ] RN, [  ] Physical Therapy, [  ] Palliative Care Team  DVT PPX: [  ] Lovenox, [ x ] SC Heparin, [  ] Coumadin, [  ] Xarelto, [  ] Eliquis, [  ] Pradaxa, [  ] IV Heparin drip, [  ] SCD, [  ] Ambulation, [  ] Contraindicated 2/2 GI Bleed, [  ] Contraindicated 2/2  Bleed, [  ] Contraindicated 2/2 Brain Bleed  Advanced Directive: [ x ] None, [  ] DNR/DNI

## 2021-02-16 NOTE — PROGRESS NOTE ADULT - NUTRITIONAL ASSESSMENT
MEDICATIONS  (STANDING):  atorvastatin 10 milliGRAM(s) Oral at bedtime  azaTHIOprine 100 milliGRAM(s) Oral daily  cefTRIAXone   IVPB 2000 milliGRAM(s) IV Intermittent every 24 hours  cloNIDine 0.1 milliGRAM(s) Oral two times a day  dextrose 40% Gel 15 Gram(s) Oral once  dextrose 5%. 1000 milliLiter(s) (50 mL/Hr) IV Continuous <Continuous>  dextrose 5%. 1000 milliLiter(s) (100 mL/Hr) IV Continuous <Continuous>  dextrose 50% Injectable 25 Gram(s) IV Push once  dextrose 50% Injectable 25 Gram(s) IV Push once  dextrose 50% Injectable 12.5 Gram(s) IV Push once  famotidine    Tablet 20 milliGRAM(s) Oral daily  gabapentin 300 milliGRAM(s) Oral two times a day  gentamicin 0.3% Ophthalmic Solution 1 Drop(s) Right EYE four times a day  glucagon  Injectable 1 milliGRAM(s) IntraMuscular once  heparin   Injectable 5000 Unit(s) SubCutaneous every 8 hours  hydrALAZINE 25 milliGRAM(s) Oral three times a day  insulin glargine Injectable (LANTUS) 40 Unit(s) SubCutaneous at bedtime  insulin lispro (ADMELOG) corrective regimen sliding scale   SubCutaneous three times a day before meals  insulin lispro (ADMELOG) corrective regimen sliding scale   SubCutaneous at bedtime  insulin lispro Injectable (ADMELOG) 13 Unit(s) SubCutaneous three times a day before meals  losartan 25 milliGRAM(s) Oral daily  metoprolol tartrate 75 milliGRAM(s) Oral two times a day  tacrolimus 1.5 milliGRAM(s) Oral two times a day  trimethoprim   80 mG/sulfamethoxazole 400 mG 1 Tablet(s) Oral daily

## 2021-02-16 NOTE — PROGRESS NOTE ADULT - ASSESSMENT
54 YO M with hx CKD s/p renal transplant, CVA, DM, neuropathy, DVT, HLD, Hypertension ,Obesity, squamous cell carcinoma, presents to ED for redness/swelling R leg. Patient states 3 days ago, he first noticed his toe nail on the R 3rd digit starting to "lift up" from a "small pimple" underneath. States there was also clear/red oozing. Patient made an appointment with Dr. Araiza for Friday of this week. Yesterday patient developed redness and swelling in the R lower leg and had blood sugars in the 300s which prompted him to come to the ED. Patient has peripheral neuropathy and states he has no pain in his legs. Denies any fever, chills, SOB, CP, N/V.     patient was admitted to [facility] last August for osteomyelitis and had left 3rd toe distal digit partial amputation and right foot distal digit 2nd digit on 8/27/20.     MEDICATIONS  (STANDING):  atorvastatin 10 milliGRAM(s) Oral at bedtime  azaTHIOprine 100 milliGRAM(s) Oral daily  cefTRIAXone   IVPB 2000 milliGRAM(s) IV Intermittent every 24 hours  cloNIDine 0.1 milliGRAM(s) Oral two times a day  dextrose 40% Gel 15 Gram(s) Oral once  dextrose 5%. 1000 milliLiter(s) (50 mL/Hr) IV Continuous <Continuous>  dextrose 5%. 1000 milliLiter(s) (100 mL/Hr) IV Continuous <Continuous>  dextrose 50% Injectable 25 Gram(s) IV Push once  dextrose 50% Injectable 12.5 Gram(s) IV Push once  dextrose 50% Injectable 25 Gram(s) IV Push once  famotidine    Tablet 20 milliGRAM(s) Oral daily  gabapentin 300 milliGRAM(s) Oral two times a day  glucagon  Injectable 1 milliGRAM(s) IntraMuscular once  heparin   Injectable 5000 Unit(s) IV Push once  hydrALAZINE 25 milliGRAM(s) Oral three times a day  insulin glargine Injectable (LANTUS) 25 Unit(s) SubCutaneous at bedtime  insulin lispro (ADMELOG) corrective regimen sliding scale   SubCutaneous three times a day before meals  insulin lispro (ADMELOG) corrective regimen sliding scale   SubCutaneous at bedtime  insulin lispro Injectable (ADMELOG) 8 Unit(s) SubCutaneous three times a day before meals  losartan 25 milliGRAM(s) Oral daily  metoprolol tartrate Oral Tab/Cap - Peds 75 milliGRAM(s) Oral two times a day  tacrolimus 1.5 milliGRAM(s) Oral two times a day  trimethoprim   80 mG/sulfamethoxazole 400 mG 1 Tablet(s) Oral daily

## 2021-02-16 NOTE — PROGRESS NOTE ADULT - PROBLEM SELECTOR PLAN 3
DM type 1, hyperglycemic on admission, bg ranging 167-214  -patient is on insulin pump 3 units/hr basal rate x 24 hrs, uses humalog: will hold insulin pump for now as patient's site got disconnected when changing his clothes on admission (on prior admission had kept pump at 70% basal rate ie 2.1 units/hr x 24 hrs)  -mod dose ISS   -continue lantus 50 units at bedtime  -continue 15 units premeal   -patient prefers MDII, hold insulin pump    -A1c 10  -hypoglycemia protocol   -Endocrine Dr. Craig Perlman consulted; recs appreciated

## 2021-02-16 NOTE — PROGRESS NOTE ADULT - SUBJECTIVE AND OBJECTIVE BOX
[facility] -- Julisa Jansen, Luly Larson, Karan Kelly Savella  Office # [phone]      Follow Up:    HTN, Cardiac optimization pre/post op  Subjective/Observations:   No events overnight resting comfortably in bed.  No complaints of chest pain, dyspnea, or palpitations reported. No signs of orthopnea or PND.     REVIEW OF SYSTEMS: All other review of systems is negative unless indicated above    PAST MEDICAL & SURGICAL HISTORY:  Recurrent squamous cell carcinoma of skin  nose, L arm    History of DVT (deep vein thrombosis)    Squamous cell carcinoma of skin of left ear    CVA (cerebral vascular accident)    Obesity (BMI 30-39.9)    Diabetic peripheral neuropathy    CKD (chronic kidney disease)  Renal Transplant 2013 at [facility]    Diabetes mellitus    Hyperlipidemia    Hypertension    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    S/P kidney transplant    End-stage renal failure with renal transplant  12/2013    H/O foot surgery  2005 - right foot - bone fracture - s/p ORIF and subsequent removal of hardware    Vasectomy status  s/p b/l  vasectomy    Ear disease  Left inner ear surgery, 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 1000 milliLiter(s) (50 mL/Hr) IV Continuous <Continuous>  dextrose 5%. 1000 milliLiter(s) (100 mL/Hr) IV Continuous <Continuous>  dextrose 50% Injectable 25 Gram(s) IV Push once  dextrose 50% Injectable 25 Gram(s) IV Push once  dextrose 50% Injectable 12.5 Gram(s) IV Push once  famotidine    Tablet 20 milliGRAM(s) Oral daily  gabapentin 300 milliGRAM(s) Oral two times a day  gentamicin 0.3% Ophthalmic Solution 1 Drop(s) Right EYE four times a day  glucagon  Injectable 1 milliGRAM(s) IntraMuscular once  heparin   Injectable 5000 Unit(s) SubCutaneous every 8 hours  hydrALAZINE 25 milliGRAM(s) Oral three times a day  insulin glargine Injectable (LANTUS) 50 Unit(s) SubCutaneous at bedtime  insulin lispro (ADMELOG) corrective regimen sliding scale   SubCutaneous three times a day before meals  insulin lispro (ADMELOG) corrective regimen sliding scale   SubCutaneous at bedtime  insulin lispro Injectable (ADMELOG) 15 Unit(s) SubCutaneous three times a day before meals  losartan 25 milliGRAM(s) Oral daily  metoprolol tartrate 75 milliGRAM(s) Oral two times a day  sodium chloride 0.45%. 1000 milliLiter(s) (50 mL/Hr) IV Continuous <Continuous>  tacrolimus 1.5 milliGRAM(s) Oral two times a day  trimethoprim   80 mG/sulfamethoxazole 400 mG 1 Tablet(s) Oral daily    MEDICATIONS  (PRN):  melatonin 5 milliGRAM(s) Oral at bedtime PRN Insomnia      Allergies    No Known Allergies    Intolerances        Vital Signs Last 24 Hrs  T(C): 36.8 (16 Feb 2021 12:38), Max: 37.6 (15 Feb 2021 20:53)  T(F): 98.3 (16 Feb 2021 12:38), Max: 99.6 (15 Feb 2021 20:53)  HR: 67 (16 Feb 2021 12:38) (65 - 75)  BP: 133/81 (16 Feb 2021 12:38) (118/74 - 148/84)  BP(mean): --  RR: 20 (16 Feb 2021 12:38) (18 - 20)  SpO2: 93% (16 Feb 2021 12:38) (91% - 96%)    I&O's Summary    15 Feb 2021 07:01  -  16 Feb 2021 07:00  --------------------------------------------------------  IN: 375 mL / OUT: 200 mL / NET: 175 mL          PHYSICAL EXAM:  TELE: Off tele   Constitutional: NAD, awake and alert, well-developed  HEENT: Moist Mucous Membranes, Anicteric  Pulmonary: Non-labored, breath sounds are clear bilaterally, No wheezing, crackles or rhonchi  Cardiovascular: Regular, S1 and S2 nl, No murmurs, rubs, gallops or clicks  Gastrointestinal: Bowel Sounds present, soft, nontender.   Lymph: No lymphadenopathy. No peripheral edema.  Skin: No visible rashes or ulcers.  Psych:  Mood & affect appropriate    LABS: All Labs Reviewed:                        14.5   8.28  )-----------( 213      ( 16 Feb 2021 06:48 )             44.0                         14.6   8.73  )-----------( 201      ( 15 Feb 2021 07:11 )             44.1                         14.5   8.53  )-----------( 221      ( 14 Feb 2021 08:17 )             43.8     16 Feb 2021 06:48    140    |  102    |  20     ----------------------------<  175    4.5     |  33     |  1.20   15 Feb 2021 07:11    140    |  101    |  22     ----------------------------<  204    4.4     |  32     |  1.40   14 Feb 2021 08:17    138    |  101    |  18     ----------------------------<  197    4.1     |  31     |  1.20     Ca    9.2        16 Feb 2021 06:48  Ca    9.3        15 Feb 2021 07:11  Ca    9.4        14 Feb 2021 08:17    TPro  7.3    /  Alb  3.4    /  TBili  0.7    /  DBili  x      /  AST  17     /  ALT  18     /  AlkPhos  117    15 Feb 2021 07:11  TPro  7.5    /  Alb  3.3    /  TBili  0.7    /  DBili  x      /  AST  12     /  ALT  17     /  AlkPhos  121    14 Feb 2021 08:17

## 2021-02-16 NOTE — PROGRESS NOTE ADULT - PROBLEM SELECTOR PROBLEM 2
CKD (chronic kidney disease)
Cellulitis
CKD (chronic kidney disease)
Cellulitis
CKD (chronic kidney disease)
Cellulitis

## 2021-02-16 NOTE — PROGRESS NOTE ADULT - PROBLEM SELECTOR PROBLEM 4
HTN (hypertension)
Osteomyelitis of right foot, unspecified type
HTN (hypertension)
Osteomyelitis of right foot, unspecified type
HTN (hypertension)
Osteomyelitis of right foot, unspecified type
HTN (hypertension)
HTN (hypertension)
Osteomyelitis of right foot, unspecified type
HTN (hypertension)
Osteomyelitis of right foot, unspecified type
Osteomyelitis of right foot, unspecified type

## 2021-02-16 NOTE — PROGRESS NOTE ADULT - ASSESSMENT
Pt is a 55M w/ PMHx of ESR s/p RT, DM, HLD, HTN p/w redness/swelling R leg, seen by podiatry now s/p 3rd ray amputation of R foot    R foot OM/RLE cellulitis  s/p partial amputation of third digit of right foot on 2/11  -pending BCx--NGTD  -pending path--reviewed--CLEAR MARGINS  -2/11 TCx w/ Staphylococcus lugdunensis/CoNs, susceptibilities reviewed  -can d/c ceftriaxone and switch to keflex--   When ready for discharge, patient can be switched to cephalexin 500mg 4x daily to complete course until 2/24/2021    Renal Txp  -c/w bactrim for PJP ppx

## 2021-02-16 NOTE — PHARMACOTHERAPY INTERVENTION NOTE - COMMENTS
Patient currently ordered for ceftriaxone 2g Q24h for osteomyelitis. Patient tolerating other PO meds with plan to switch to PO antibiotics at discharge. Discussed with ID Dr. Claire Castro, recommended switching to PO Keflex 500 mg four times daily while inpatient, accepted.
Patient currently ordered for vancomycin 1000 mg Q12h empirically for osteomyelitis. Patient is 142.9 kg and is s/p renal transplant in 2013. Discussed with ID Dr. Saucedo, vancomycin unlikely to reach therapeutic levels based on patient’s weight. With kidney transplant, recommended switching to less nephrotoxic agents for MRSA coverage. Dr. Saucedo would like to switch to linezolid 600 mg as a one-time dose tonight and will evaluate patient for need to continue empiric MRSA coverage.

## 2021-02-16 NOTE — PROGRESS NOTE ADULT - PROBLEM SELECTOR PLAN 7
-chronic, on lovastatin 40 mg qD at home, continue  -DASH/TLC diet, consistent carb

## 2021-02-16 NOTE — PROGRESS NOTE ADULT - ASSESSMENT
54y old  Male who presents with a chief complaint of r/o osteomyelitis, scheduled for L third digit amputation and partial right 2nd digit amputation. S/P partial amputation of third right toe  Non-healing toe ulcers    Possible  OM.    - ID following awaiting cultures  - s/p toe amputation left 3rd digit and right partial 2nd digit   - Tolerated procedure well with no obvious cardiac complications  - Pain control  - Follow ID./ortho recs    History of CVA   - Continue statin  -would resume ASA if cleared by ortho    HTN   - BP stable   - Continue Losartan 25 QD Can up titrate if needed  - Continue home clonidine 0.1 BID, hydralazine 25 q8, lopressor 75 BID      -Chronic kidney disease,   S/P kidney transplant    creat stable  -Renal following    - Monitor and replete lytes, keep K>4, Mg>2.  - All other medical needs as per primary team.  - Other cardiovascular workup will depend on clinical course.  - Will continue to follow.    Maxi Tovar DNP, ANP-c  Cardiology   Spectra #[id number]  [phone]

## 2021-02-16 NOTE — PROGRESS NOTE ADULT - PROBLEM SELECTOR PLAN 2
-RLE warmth, swelling, redness below knee -improving   -responded well to IV Rocephin  -Bone pathology s/p amputation negative for osteomyelitis  -Pt to be d/c home on PO Keflex

## 2021-02-16 NOTE — PROGRESS NOTE ADULT - REASON FOR ADMISSION
osteomyelitis

## 2021-02-16 NOTE — PROGRESS NOTE ADULT - SUBJECTIVE AND OBJECTIVE BOX
[facility], Division of Infectious Diseases  MEL Mckoy Y. Patel, S. Shah  [phone]    Name: LUTHER NORIEGA  Age: 55y  Gender: Male  MRN: [id number]  Note Date: 02-16-21    Interval History:  Patient seen and examined at bedside this morning  No acute overnight events. Afebrile  No complaints  Awaiting pathology results  Notes reviewed    Antibiotics:  cefTRIAXone   IVPB 2000 milliGRAM(s) IV Intermittent every 24 hours  trimethoprim   80 mG/sulfamethoxazole 400 mG 1 Tablet(s) Oral daily      Medications:  atorvastatin 10 milliGRAM(s) Oral at bedtime  azaTHIOprine 100 milliGRAM(s) Oral daily  cefTRIAXone   IVPB 2000 milliGRAM(s) IV Intermittent every 24 hours  cloNIDine 0.1 milliGRAM(s) Oral two times a day  dextrose 40% Gel 15 Gram(s) Oral once  dextrose 5%. 1000 milliLiter(s) IV Continuous <Continuous>  dextrose 5%. 1000 milliLiter(s) IV Continuous <Continuous>  dextrose 50% Injectable 25 Gram(s) IV Push once  dextrose 50% Injectable 25 Gram(s) IV Push once  dextrose 50% Injectable 12.5 Gram(s) IV Push once  famotidine    Tablet 20 milliGRAM(s) Oral daily  gabapentin 300 milliGRAM(s) Oral two times a day  gentamicin 0.3% Ophthalmic Solution 1 Drop(s) Right EYE four times a day  glucagon  Injectable 1 milliGRAM(s) IntraMuscular once  heparin   Injectable 5000 Unit(s) SubCutaneous every 8 hours  hydrALAZINE 25 milliGRAM(s) Oral three times a day  insulin glargine Injectable (LANTUS) 50 Unit(s) SubCutaneous at bedtime  insulin lispro (ADMELOG) corrective regimen sliding scale   SubCutaneous three times a day before meals  insulin lispro (ADMELOG) corrective regimen sliding scale   SubCutaneous at bedtime  insulin lispro Injectable (ADMELOG) 15 Unit(s) SubCutaneous three times a day before meals  losartan 25 milliGRAM(s) Oral daily  melatonin 5 milliGRAM(s) Oral at bedtime PRN  metoprolol tartrate 75 milliGRAM(s) Oral two times a day  sodium chloride 0.45%. 1000 milliLiter(s) IV Continuous <Continuous>  tacrolimus 1.5 milliGRAM(s) Oral two times a day  trimethoprim   80 mG/sulfamethoxazole 400 mG 1 Tablet(s) Oral daily      Review of Systems:  A 10-point review of systems was obtained.     Pertinent positives and negatives--  Constitutional: No fevers. No Chills. No Rigors.   Cardiovascular: No chest pain. No palpitations.  Respiratory: No shortness of breath. No cough.  Gastrointestinal: No nausea or vomiting. No diarrhea or constipation.   Psychiatric: Pleasant. Appropriate affect.    Review of systems otherwise negative except as previously noted.    Allergies: No Known Allergies    For details regarding the patient's past medical history, social history, family history, and other miscellaneous elements, please refer the initial infectious diseases consultation and/or the admitting history and physical examination for this admission.    Objective:  Vitals:   T(C): 36.7 (02-16-21 @ 04:58), Max: 37.6 (02-15-21 @ 20:53)  HR: 65 (02-16-21 @ 04:58) (65 - 75)  BP: 129/74 (02-16-21 @ 04:58) (118/74 - 148/84)  RR: 19 (02-16-21 @ 04:58) (18 - 20)  SpO2: 92% (02-16-21 @ 04:58) (91% - 96%)    Physical Examination:  General: no acute distress  HEENT: NC/AT, EOMI, anicteric, no oral lesions  Neck: supple, no palpable LAD  Cardio: S1, S2 heard, RRR, no murmurs  Resp: breath sounds heard bilaterally, no rales, wheezes or rhonchi  Abd: soft, NT, ND, + bowel sounds  Neuro: AAOx3, no obvious focal deficits  Ext: RLE cellulitis resolved  Skin: warm, dry, no visible rash    Laboratory Studies:  CBC:                       14.5   8.28  )-----------( 213      ( 16 Feb 2021 06:48 )             44.0     CMP: 02-16    140  |  102  |  20  ----------------------------<  175<H>  4.5   |  33<H>  |  1.20    Ca    9.2      16 Feb 2021 06:48    TPro  7.3  /  Alb  3.4  /  TBili  0.7  /  DBili  x   /  AST  17  /  ALT  18  /  AlkPhos  117  02-15    LIVER FUNCTIONS - ( 15 Feb 2021 07:11 )  Alb: 3.4 g/dL / Pro: 7.3 g/dL / ALK PHOS: 117 U/L / ALT: 18 U/L / AST: 17 U/L / GGT: x             Microbiology: reviewed    Culture - Tissue with Gram Stain (collected 02-11-21 @ 12:36)  Source: .Tissue Other, 3rd toe right foot head of mid  Gram Stain (02-11-21 @ 22:18):    Rare polymorphonuclear leukocytes    Rare Gram Positive Cocci per oil power field    Rare Gram Variable Rods per oil power field  Preliminary Report (02-12-21 @ 11:02):    No growth    Culture - Tissue with Gram Stain (collected 02-11-21 @ 12:35)  Source: .Tissue Other, 3rd toe right foot distal phal  Gram Stain (02-11-21 @ 22:19):    Rare polymorphonuclear leukocytes    Rare Gram positive cocci in pairs per oil power field    Rare Gram Variable Rods per oil power field  Preliminary Report (02-12-21 @ 11:03):    No growth    Pathology reviewed  Surgical Pathology Report (02.11.21 @ 08:30)   Surgical Pathology Report:   ACCESSION No: [id number]   LUTHER NORIEGA 2   Surgical Final Report   Final Diagnosis   1. Distal Phalanx, third toe, foot, right, amputation:   Mild chronic osteomyelitis.   Extensive bone remodeling.   Skin ulceration with acute necrotizing inflammation.   2. Head of middle phalanx, third toe, foot, right:   Viable cartilage capped bone, negative for osteomyelitis.   Verified by: Sebastian Wooten M.D.   (Electronic Signature)   Reported on: 02/16/21 09:53 [facility], [facility], [address] [place] [address]     Radiology: reviewed

## 2021-02-16 NOTE — PROGRESS NOTE ADULT - PROBLEM SELECTOR PLAN 2
CHRONIC KIDNEY DISEASE, STAGE 1: s/p kidney transplant   Serum creatinine is increase to  1.4 , improved to 1.2  There is no progression.  No uremic symptoms. No evidence of  worsening  Anemia. Fluid status stable.   Will continue to avoid nephrotoxic drugs.  Patient remains asymptomatic.  Continue current therapy. continue to avoid nephrotoxic drugs.  Patient remains asymptomatic.  Continue current therapy.

## 2021-02-16 NOTE — PROGRESS NOTE ADULT - ATTENDING COMMENTS
Pt seen, examined, case & care plan d/w pt,  at detail.  AM labs   Awaiting  Bone Pathology Results from OR for D/C plan  Pt is ambulating in rios ways.
Infectious Diseases will continue to follow. Please call with any questions.   Claire Castro M.D.  [facility], Division of Infectious Diseases [phone]  For over the weekend and after hours, please call [phone]
I saw and examined the patient personally. Spoke with above provider regarding this case. I reviewed the above findings completely.  I agree with the above history, physical, and plan which I have edited where appropriate.
Infectious Diseases will sign off at this time, Please call with any further questions  Claire Castro M.D.  [facility], Division of Infectious Diseases [phone]  For over the weekend and after hours, please call [phone]
Seen/examined. agree with above.
Chart reviewed    Patient seen and examined    Agree with plan as outlined above
Seen/examined. agree with above.
Pt seen, examined, case & care plan d/w pt, residents at detail.  AM labs   Awaiting  Bone Pathology Results from OR for D/C plan  Pt is ambulating in rios ways.
Pt seen, examined, case & care plan d/w pt, residents at detail.  AM labs   PO diet   Awaiting Bone Pathology.
pt seen, examined, case & care plan D/W pt residents at detail.  AM labs   NO PT need  Awaiting bone pathology result.
pt seen, examined, case & care plan d/w pt, residents at detail.  D/C home today, D/W ID -Dr NADEEM Castro -D/C Home on PO ABx   Total d/c care time is 60 minutes.
Pt seen, examined, case & care plan d/w pt, residents at detail.  AM labs   D/W Podiatry   Awaiting OR Pathology results.

## 2021-02-16 NOTE — PROGRESS NOTE ADULT - PROBLEM SELECTOR PLAN 5
Maksim CVA (2015) - residual deficits of loss of pain and temperature sensation in left arm and right face  -continue gabapentin 300 mg BID for residual neuropathic facial pain.  -Cont ASA, Statin for hx CVA
Maksim CVA (2015) - residual deficits of loss of pain and temperature sensation in left arm and right face  -continue home gabapentin for residual neuropathic facial pain.  continue gabapentin 300 mg BID

## 2021-02-16 NOTE — PROGRESS NOTE ADULT - PROBLEM SELECTOR PROBLEM 1
Diabetes mellitus type 1, uncontrolled
Diabetes mellitus type 1, uncontrolled
Osteomyelitis of right foot, unspecified type
Renal transplant recipient
Osteomyelitis of right foot, unspecified type
Osteomyelitis of right foot, unspecified type
Diabetes mellitus type 1, uncontrolled
Renal transplant recipient
Diabetes mellitus
Diabetes mellitus type 1, uncontrolled
Osteomyelitis of right foot, unspecified type
Renal transplant recipient
Osteomyelitis of right foot, unspecified type
Renal transplant recipient
Renal transplant recipient
Osteomyelitis of right foot, unspecified type
Renal transplant recipient
Osteomyelitis of right foot, unspecified type

## 2021-02-16 NOTE — DISCHARGE NOTE NURSING/CASE MANAGEMENT/SOCIAL WORK - PATIENT PORTAL LINK FT
You can access the FollowMyHealth Patient Portal offered by [facility] by registering at the following website: [email or web address]. By joining [employer]’s FollowMyHealth portal, you will also be able to view your health information using other applications (apps) compatible with our system.

## 2021-02-17 PROBLEM — C44.92 SQUAMOUS CELL CARCINOMA OF SKIN, UNSPECIFIED: Chronic | Status: ACTIVE | Noted: 2021-02-10

## 2021-02-17 LAB
CULTURE RESULTS: SIGNIFICANT CHANGE UP
ORGANISM # SPEC MICROSCOPIC CNT: SIGNIFICANT CHANGE UP
SPECIMEN SOURCE: SIGNIFICANT CHANGE UP

## 2021-02-19 LAB
CULTURE RESULTS: SIGNIFICANT CHANGE UP
SPECIMEN SOURCE: SIGNIFICANT CHANGE UP

## 2021-02-20 ENCOUNTER — APPOINTMENT (OUTPATIENT)
Dept: WOUND CARE | Facility: HOSPITAL | Age: 56
End: 2021-02-20
Payer: MEDICARE

## 2021-02-20 ENCOUNTER — OUTPATIENT (OUTPATIENT)
Dept: OUTPATIENT SERVICES | Facility: HOSPITAL | Age: 56
LOS: 1 days | Discharge: HOME | End: 2021-02-20
Payer: MEDICARE

## 2021-02-20 VITALS
HEIGHT: 73 IN | TEMPERATURE: 97.3 F | RESPIRATION RATE: 20 BRPM | OXYGEN SATURATION: 95 % | HEART RATE: 70 BPM | WEIGHT: 315 LBS | BODY MASS INDEX: 41.75 KG/M2 | DIASTOLIC BLOOD PRESSURE: 75 MMHG | SYSTOLIC BLOOD PRESSURE: 138 MMHG

## 2021-02-20 DIAGNOSIS — M86.9 OSTEOMYELITIS, UNSPECIFIED: ICD-10-CM

## 2021-02-20 DIAGNOSIS — Z94.0 KIDNEY TRANSPLANT STATUS: Chronic | ICD-10-CM

## 2021-02-20 DIAGNOSIS — M86.8X7 OTHER OSTEOMYELITIS, ANKLE AND FOOT: ICD-10-CM

## 2021-02-20 DIAGNOSIS — S91.309A UNSPECIFIED OPEN WOUND, UNSPECIFIED FOOT, INITIAL ENCOUNTER: ICD-10-CM

## 2021-02-20 DIAGNOSIS — N18.6 END STAGE RENAL DISEASE: Chronic | ICD-10-CM

## 2021-02-20 DIAGNOSIS — Z89.421 ACQUIRED ABSENCE OF OTHER RIGHT TOE(S): Chronic | ICD-10-CM

## 2021-02-20 PROCEDURE — 99213 OFFICE O/P EST LOW 20 MIN: CPT

## 2021-02-20 PROCEDURE — G0463: CPT

## 2021-02-20 RX ORDER — SULFAMETHOXAZOLE AND TRIMETHOPRIM 400; 80 MG/1; MG/1
400-80 TABLET ORAL DAILY
Qty: 90 | Refills: 3 | Status: DISCONTINUED | COMMUNITY
End: 2021-02-20

## 2021-02-23 DIAGNOSIS — E11.40 TYPE 2 DIABETES MELLITUS WITH DIABETIC NEUROPATHY, UNSPECIFIED: ICD-10-CM

## 2021-02-23 DIAGNOSIS — Z86.73 PERSONAL HISTORY OF TRANSIENT ISCHEMIC ATTACK (TIA), AND CEREBRAL INFARCTION WITHOUT RESIDUAL DEFICITS: ICD-10-CM

## 2021-02-23 DIAGNOSIS — E78.5 HYPERLIPIDEMIA, UNSPECIFIED: ICD-10-CM

## 2021-02-23 DIAGNOSIS — E11.621 TYPE 2 DIABETES MELLITUS WITH FOOT ULCER: ICD-10-CM

## 2021-02-23 DIAGNOSIS — L97.512 NON-PRESSURE CHRONIC ULCER OF OTHER PART OF RIGHT FOOT WITH FAT LAYER EXPOSED: ICD-10-CM

## 2021-02-23 DIAGNOSIS — Z86.718 PERSONAL HISTORY OF OTHER VENOUS THROMBOSIS AND EMBOLISM: ICD-10-CM

## 2021-02-23 DIAGNOSIS — T86.828 OTHER COMPLICATIONS OF SKIN GRAFT (ALLOGRAFT) (AUTOGRAFT): ICD-10-CM

## 2021-02-23 DIAGNOSIS — Z79.4 LONG TERM (CURRENT) USE OF INSULIN: ICD-10-CM

## 2021-02-23 DIAGNOSIS — Z94.0 KIDNEY TRANSPLANT STATUS: ICD-10-CM

## 2021-02-23 DIAGNOSIS — Z87.81 PERSONAL HISTORY OF (HEALED) TRAUMATIC FRACTURE: ICD-10-CM

## 2021-02-23 DIAGNOSIS — Z79.899 OTHER LONG TERM (CURRENT) DRUG THERAPY: ICD-10-CM

## 2021-02-23 DIAGNOSIS — Z85.828 PERSONAL HISTORY OF OTHER MALIGNANT NEOPLASM OF SKIN: ICD-10-CM

## 2021-02-23 DIAGNOSIS — E11.22 TYPE 2 DIABETES MELLITUS WITH DIABETIC CHRONIC KIDNEY DISEASE: ICD-10-CM

## 2021-02-23 DIAGNOSIS — N18.30 CHRONIC KIDNEY DISEASE, STAGE 3 UNSPECIFIED: ICD-10-CM

## 2021-02-23 DIAGNOSIS — Z79.82 LONG TERM (CURRENT) USE OF ASPIRIN: ICD-10-CM

## 2021-02-23 DIAGNOSIS — I13.10 HYPERTENSIVE HEART AND CHRONIC KIDNEY DISEASE WITHOUT HEART FAILURE, WITH STAGE 1 THROUGH STAGE 4 CHRONIC KIDNEY DISEASE, OR UNSPECIFIED CHRONIC KIDNEY DISEASE: ICD-10-CM

## 2021-02-23 DIAGNOSIS — Z89.422 ACQUIRED ABSENCE OF OTHER LEFT TOE(S): ICD-10-CM

## 2021-02-23 DIAGNOSIS — G47.33 OBSTRUCTIVE SLEEP APNEA (ADULT) (PEDIATRIC): ICD-10-CM

## 2021-02-23 DIAGNOSIS — Z80.8 FAMILY HISTORY OF MALIGNANT NEOPLASM OF OTHER ORGANS OR SYSTEMS: ICD-10-CM

## 2021-02-23 DIAGNOSIS — Z83.3 FAMILY HISTORY OF DIABETES MELLITUS: ICD-10-CM

## 2021-02-23 DIAGNOSIS — Z89.421 ACQUIRED ABSENCE OF OTHER RIGHT TOE(S): ICD-10-CM

## 2021-02-23 DIAGNOSIS — Z80.3 FAMILY HISTORY OF MALIGNANT NEOPLASM OF BREAST: ICD-10-CM

## 2021-02-23 NOTE — ASSESSMENT
[Verbal] : Verbal [Patient] : Patient [Written] : Written [Good - alert, interested, motivated] : Good - alert, interested, motivated [Verbalizes knowledge/Understanding] : Verbalizes knowledge/understanding [Dressing changes] : dressing changes [Foot Care] : foot care [Skin Care] : skin care [Signs and symptoms of infection] : sign and symptoms of infection [How and When to Call] : how and when to call [Labs and Tests] : labs and tests [Hyperbaric Therapy] : hyperbaric therapy [Off-loading] : off-loading [Compression Therapy] : compression therapy [Glycemic Control] : glycemic control [Patient responsibility to plan of care] : patient responsibility to plan of care [] : Yes [Stable] : stable [Home] : Home [Ambulatory] : Ambulatory [Not Applicable - Long Term Care/Home Health Agency] : Long Term Care/Home Health Agency: Not Applicable [FreeTextEntry2] : Promote optimal skin integrity, offloading, infection prevention, edema control, HBO tx [FreeTextEntry4] : Circulation\par \par Dorsalis Pedis:  bilateral palpable \par Posterior Tibialis:  bilateral edema,  unable to palpate\par Doppler Pulses:  bilateral  present \par Extremity Color:  bilateral pigmented\par Extremity Temperature:  bilateral warm \par Capillary Refill:  bilateral <3 sec \par PHUONG:   See vascular studies done 10/19/20, repeat vascular studies not ordered as per DPM\par \par HBO orientation today.  Patient viewed HBO video, signed HBO consent and HBO Preprocedure Education Checklist.\par Submitted authorization for HBO tx. for Compromised Flap\par BW done at [facility] last week.  Patient still needs BW for ESR, CRP and Prealbumen and chest xray.  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

## 2021-02-23 NOTE — PLAN
[FreeTextEntry1] : Patient examined and evaluated at this time.\par Continue local wound care and offloading.\par Patient is a HBOT candidate and will benefit. Will auth for HBOT.\par Spent 20 minutes for patient care and medical decision making.\par

## 2021-02-23 NOTE — PHYSICAL EXAM
[0] : left 0 [Skin Ulcer] : ulcer [Calm] : calm [4 x 4] : 4 x 4  [Ankle Swelling (On Exam)] : not present [Varicose Veins Of Lower Extremities] : not present [] : not present [de-identified] : A&Ox3, NAD [de-identified] : s/p right 3rd digit distal amputation [de-identified] : right 3rd digit distal amputation site with sutures intact, ischemic changes noted along skin flaps [de-identified] : loss of light touch sensation bilaterally [FreeTextEntry1] : Right 3rd toe distal amputation site (s/p surgery on 2/11/21) - sutures in place [de-identified] : small serosanguineous [de-identified] : dry eschar [de-identified] : none [de-identified] : NSC [de-identified] : Alginate Ag [de-identified] : Ace wraps [de-identified] : 3x Weekly

## 2021-02-23 NOTE — REVIEW OF SYSTEMS
[Skin Wound] : skin wound [Fever] : no fever [Eye Pain] : no eye pain [Chest Pain] : no chest pain [Shortness Of Breath] : no shortness of breath [Cough] : no cough [Abdominal Pain] : no abdominal pain [FreeTextEntry4] : implant [FreeTextEntry9] : s/p right 3rd digit distal amputation [de-identified] : right 3rd digit distal amputation site with sutures intact, ischemic changes noted along skin flaps [de-identified] : diabetic neuropathy [de-identified] : type 2 diabetes

## 2021-02-23 NOTE — HISTORY OF PRESENT ILLNESS
[FreeTextEntry1] : The wound is located on patient's right 3rd toe.  Patient reports that his toe became infected a couple of weeks ago.  Patient reports that he had no trauma to area and the wound appeared gradually.  Patient was tx at [facility] (2/10/21 - 2/16/21).   Patient had distal amputation on 2/11/21.   Patient was d/c'd home with oral Cephalexin.

## 2021-02-24 ENCOUNTER — OUTPATIENT (OUTPATIENT)
Dept: OUTPATIENT SERVICES | Facility: HOSPITAL | Age: 56
LOS: 1 days | Discharge: HOME | End: 2021-02-24
Payer: MEDICARE

## 2021-02-24 ENCOUNTER — RESULT REVIEW (OUTPATIENT)
Age: 56
End: 2021-02-24

## 2021-02-24 ENCOUNTER — OUTPATIENT (OUTPATIENT)
Dept: OUTPATIENT SERVICES | Facility: HOSPITAL | Age: 56
LOS: 1 days | End: 2021-02-24
Payer: MEDICARE

## 2021-02-24 ENCOUNTER — APPOINTMENT (OUTPATIENT)
Dept: PODIATRY | Facility: HOSPITAL | Age: 56
End: 2021-02-24
Payer: MEDICARE

## 2021-02-24 ENCOUNTER — NON-APPOINTMENT (OUTPATIENT)
Age: 56
End: 2021-02-24

## 2021-02-24 VITALS
BODY MASS INDEX: 41.75 KG/M2 | RESPIRATION RATE: 20 BRPM | HEIGHT: 73 IN | OXYGEN SATURATION: 96 % | SYSTOLIC BLOOD PRESSURE: 178 MMHG | TEMPERATURE: 98.6 F | HEART RATE: 64 BPM | DIASTOLIC BLOOD PRESSURE: 81 MMHG | WEIGHT: 315 LBS

## 2021-02-24 DIAGNOSIS — Z89.421 ACQUIRED ABSENCE OF OTHER RIGHT TOE(S): Chronic | ICD-10-CM

## 2021-02-24 DIAGNOSIS — N18.6 END STAGE RENAL DISEASE: Chronic | ICD-10-CM

## 2021-02-24 DIAGNOSIS — Z94.0 KIDNEY TRANSPLANT STATUS: Chronic | ICD-10-CM

## 2021-02-24 DIAGNOSIS — S91.309D UNSPECIFIED OPEN WOUND, UNSPECIFIED FOOT, SUBSEQUENT ENCOUNTER: ICD-10-CM

## 2021-02-24 PROCEDURE — 84134 ASSAY OF PREALBUMIN: CPT

## 2021-02-24 PROCEDURE — ZZZZZ: CPT

## 2021-02-24 PROCEDURE — 36415 COLL VENOUS BLD VENIPUNCTURE: CPT

## 2021-02-24 PROCEDURE — 71046 X-RAY EXAM CHEST 2 VIEWS: CPT | Mod: 26

## 2021-02-24 PROCEDURE — 85652 RBC SED RATE AUTOMATED: CPT

## 2021-02-24 PROCEDURE — G0463: CPT

## 2021-02-24 PROCEDURE — 71046 X-RAY EXAM CHEST 2 VIEWS: CPT

## 2021-02-24 PROCEDURE — 86140 C-REACTIVE PROTEIN: CPT

## 2021-02-25 DIAGNOSIS — Z89.421 ACQUIRED ABSENCE OF OTHER RIGHT TOE(S): ICD-10-CM

## 2021-02-25 DIAGNOSIS — E11.621 TYPE 2 DIABETES MELLITUS WITH FOOT ULCER: ICD-10-CM

## 2021-02-25 DIAGNOSIS — T86.828 OTHER COMPLICATIONS OF SKIN GRAFT (ALLOGRAFT) (AUTOGRAFT): ICD-10-CM
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
## 2021-02-25 DIAGNOSIS — L97.512 NON-PRESSURE CHRONIC ULCER OF OTHER PART OF RIGHT FOOT WITH FAT LAYER EXPOSED: ICD-10-CM

## 2021-02-27 ENCOUNTER — OUTPATIENT (OUTPATIENT)
Dept: OUTPATIENT SERVICES | Facility: HOSPITAL | Age: 56
LOS: 1 days | Discharge: HOME | End: 2021-02-27
Payer: MEDICARE

## 2021-02-27 ENCOUNTER — APPOINTMENT (OUTPATIENT)
Dept: OBGYN | Facility: HOSPITAL | Age: 56
End: 2021-02-27
Payer: MEDICARE

## 2021-02-27 VITALS
SYSTOLIC BLOOD PRESSURE: 176 MMHG | RESPIRATION RATE: 18 BRPM | BODY MASS INDEX: 41.75 KG/M2 | HEIGHT: 73 IN | WEIGHT: 315 LBS | HEART RATE: 62 BPM | DIASTOLIC BLOOD PRESSURE: 77 MMHG | OXYGEN SATURATION: 95 % | TEMPERATURE: 97.5 F

## 2021-02-27 DIAGNOSIS — Z86.73 PERSONAL HISTORY OF TRANSIENT ISCHEMIC ATTACK (TIA), AND CEREBRAL INFARCTION WITHOUT RESIDUAL DEFICITS: ICD-10-CM

## 2021-02-27 DIAGNOSIS — Z89.421 ACQUIRED ABSENCE OF OTHER RIGHT TOE(S): Chronic | ICD-10-CM

## 2021-02-27 DIAGNOSIS — Z80.8 FAMILY HISTORY OF MALIGNANT NEOPLASM OF OTHER ORGANS OR SYSTEMS: ICD-10-CM

## 2021-02-27 DIAGNOSIS — T86.828 OTHER COMPLICATIONS OF SKIN GRAFT (ALLOGRAFT) (AUTOGRAFT): ICD-10-CM

## 2021-02-27 DIAGNOSIS — Z79.899 OTHER LONG TERM (CURRENT) DRUG THERAPY: ICD-10-CM

## 2021-02-27 DIAGNOSIS — Z85.828 PERSONAL HISTORY OF OTHER MALIGNANT NEOPLASM OF SKIN: ICD-10-CM

## 2021-02-27 DIAGNOSIS — Z94.0 KIDNEY TRANSPLANT STATUS: ICD-10-CM

## 2021-02-27 DIAGNOSIS — Z80.3 FAMILY HISTORY OF MALIGNANT NEOPLASM OF BREAST: ICD-10-CM

## 2021-02-27 DIAGNOSIS — E11.22 TYPE 2 DIABETES MELLITUS WITH DIABETIC CHRONIC KIDNEY DISEASE: ICD-10-CM

## 2021-02-27 DIAGNOSIS — Z89.421 ACQUIRED ABSENCE OF OTHER RIGHT TOE(S): ICD-10-CM

## 2021-02-27 DIAGNOSIS — G47.33 OBSTRUCTIVE SLEEP APNEA (ADULT) (PEDIATRIC): ICD-10-CM

## 2021-02-27 DIAGNOSIS — Z94.0 KIDNEY TRANSPLANT STATUS: Chronic | ICD-10-CM

## 2021-02-27 DIAGNOSIS — N18.30 CHRONIC KIDNEY DISEASE, STAGE 3 UNSPECIFIED: ICD-10-CM

## 2021-02-27 DIAGNOSIS — I13.10 HYPERTENSIVE HEART AND CHRONIC KIDNEY DISEASE WITHOUT HEART FAILURE, WITH STAGE 1 THROUGH STAGE 4 CHRONIC KIDNEY DISEASE, OR UNSPECIFIED CHRONIC KIDNEY DISEASE: ICD-10-CM

## 2021-02-27 DIAGNOSIS — Z86.718 PERSONAL HISTORY OF OTHER VENOUS THROMBOSIS AND EMBOLISM: ICD-10-CM

## 2021-02-27 DIAGNOSIS — N18.6 END STAGE RENAL DISEASE: Chronic | ICD-10-CM

## 2021-02-27 DIAGNOSIS — Z83.3 FAMILY HISTORY OF DIABETES MELLITUS: ICD-10-CM

## 2021-02-27 DIAGNOSIS — E11.40 TYPE 2 DIABETES MELLITUS WITH DIABETIC NEUROPATHY, UNSPECIFIED: ICD-10-CM

## 2021-02-27 DIAGNOSIS — Z89.422 ACQUIRED ABSENCE OF OTHER LEFT TOE(S): ICD-10-CM

## 2021-02-27 DIAGNOSIS — S91.309D UNSPECIFIED OPEN WOUND, UNSPECIFIED FOOT, SUBSEQUENT ENCOUNTER: ICD-10-CM

## 2021-02-27 DIAGNOSIS — Z87.81 PERSONAL HISTORY OF (HEALED) TRAUMATIC FRACTURE: ICD-10-CM

## 2021-02-27 DIAGNOSIS — Z79.82 LONG TERM (CURRENT) USE OF ASPIRIN: ICD-10-CM
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
## 2021-02-27 DIAGNOSIS — E11.621 TYPE 2 DIABETES MELLITUS WITH FOOT ULCER: ICD-10-CM

## 2021-02-27 DIAGNOSIS — Z79.4 LONG TERM (CURRENT) USE OF INSULIN: ICD-10-CM

## 2021-02-27 DIAGNOSIS — L97.512 NON-PRESSURE CHRONIC ULCER OF OTHER PART OF RIGHT FOOT WITH FAT LAYER EXPOSED: ICD-10-CM

## 2021-02-27 LAB — SARS-COV-2 RNA SPEC QL NAA+PROBE: SIGNIFICANT CHANGE UP

## 2021-02-27 PROCEDURE — U0003: CPT

## 2021-02-27 PROCEDURE — G0463: CPT

## 2021-02-27 PROCEDURE — U0005: CPT

## 2021-02-27 PROCEDURE — 99213 OFFICE O/P EST LOW 20 MIN: CPT

## 2021-02-27 NOTE — HISTORY OF PRESENT ILLNESS
[FreeTextEntry1] : 54 yo WM, here for f/u of right 3rd toe DFU. Pt underwent partial amp. of the toe about 2 wks ago and is to begin HBO tx in a few days. Here for postop f/u. No c/o\par \par

## 2021-02-27 NOTE — ASSESSMENT
[Verbal] : Verbal [Patient] : Patient [Good - alert, interested, motivated] : Good - alert, interested, motivated [Verbalizes knowledge/Understanding] : Verbalizes knowledge/understanding [Dressing changes] : dressing changes [Foot Care] : foot care [Skin Care] : skin care [Signs and symptoms of infection] : sign and symptoms of infection [How and When to Call] : how and when to call [Hyperbaric Therapy] : hyperbaric therapy [Off-loading] : off-loading [Patient responsibility to plan of care] : patient responsibility to plan of care [] : Yes [Stable] : stable [Home] : Home [Ambulatory] : Ambulatory [Not Applicable - Long Term Care/Home Health Agency] : Long Term Care/Home Health Agency: Not Applicable [FreeTextEntry2] : Restore Skin Integrity\par Infection Control\par Localized wound care\par HBOT\par Maintain acceptable pain levels at satisfactory relief.\par Demonstrates use of both nonpharmacological and pharmacological pain relief strategies [FreeTextEntry4] : Photos Taken\par TM Eval. done by MD Dave, Cleared\par COVID 19 PCR Swab obtained today and sent to lab per MD orders.\par CXR and BW Reviewed by MD Dave.\par Patient to call Monday morning for covid result clearance and Start time. Patient stated understanding.\par F/U to [facility] Daily HBOT, 1 week for assessment with Podiatrist

## 2021-02-27 NOTE — REVIEW OF SYSTEMS
[Fever] : no fever [Eye Pain] : no eye pain [Chest Pain] : no chest pain [Shortness Of Breath] : no shortness of breath [Cough] : no cough [Abdominal Pain] : no abdominal pain [Skin Wound] : skin wound [FreeTextEntry4] : implant [FreeTextEntry9] : s/p right 3rd digit distal amputation [de-identified] : right 3rd digit distal amputation site with sutures intact, ischemic changes noted along skin flaps [de-identified] : diabetic neuropathy [de-identified] : type 2 diabetes

## 2021-02-27 NOTE — PHYSICAL EXAM
[4 x 4] : 4 x 4  [JVD] : no jugular venous distention  [Normal Thyroid] : the thyroid was normal [Normal Breath Sounds] : Normal breath sounds [Normal Heart Sounds] : normal heart sounds [Normal Rate and Rhythm] : normal rate and rhythm [Ankle Swelling (On Exam)] : not present [Varicose Veins Of Lower Extremities] : not present [] : not present [Abdomen Masses] : No abdominal massess [Abdomen Tenderness] : ~T ~M No abdominal tenderness [Tender] : nontender [Enlarged] : not enlarged [Alert] : alert [Oriented to Person] : oriented to person [Oriented to Place] : oriented to place [Oriented to Time] : oriented to time [Calm] : calm [de-identified] : adult Wm, NAD, alert, Ox3. [de-identified] : Otoscopic exam- both eardrums intact/wnl. [de-identified] : all sutures in place/intact, but edges of surgical incision appear darkened.  [FreeTextEntry1] : Right 3rd Distal digit Amp site- Sutures Intact [de-identified] : Ecchymosis [de-identified] : Silver Alginate [de-identified] : Cleansed with Normal Saline\par Kerlix\par Stockinet [TWNoteComboBox4] : None [TWNoteComboBox5] : No [de-identified] : No [de-identified] : None [de-identified] : >75% [de-identified] : <20% [de-identified] : 2x Weekly

## 2021-03-01 ENCOUNTER — OUTPATIENT (OUTPATIENT)
Dept: OUTPATIENT SERVICES | Facility: HOSPITAL | Age: 56
LOS: 1 days | Discharge: HOME | End: 2021-03-01
Payer: MEDICARE

## 2021-03-01 ENCOUNTER — NON-APPOINTMENT (OUTPATIENT)
Age: 56
End: 2021-03-01

## 2021-03-01 ENCOUNTER — APPOINTMENT (OUTPATIENT)
Dept: HYPERBARIC MEDICINE | Facility: HOSPITAL | Age: 56
End: 2021-03-01
Payer: MEDICARE

## 2021-03-01 VITALS
TEMPERATURE: 97.8 F | RESPIRATION RATE: 18 BRPM | SYSTOLIC BLOOD PRESSURE: 161 MMHG | OXYGEN SATURATION: 95 % | DIASTOLIC BLOOD PRESSURE: 78 MMHG | HEART RATE: 65 BPM

## 2021-03-01 VITALS
DIASTOLIC BLOOD PRESSURE: 88 MMHG | RESPIRATION RATE: 18 BRPM | TEMPERATURE: 97.3 F | HEART RATE: 68 BPM | SYSTOLIC BLOOD PRESSURE: 186 MMHG | OXYGEN SATURATION: 98 %

## 2021-03-01 VITALS — DIASTOLIC BLOOD PRESSURE: 82 MMHG | SYSTOLIC BLOOD PRESSURE: 188 MMHG

## 2021-03-01 DIAGNOSIS — T86.828 OTHER COMPLICATIONS OF SKIN GRAFT (ALLOGRAFT) (AUTOGRAFT): ICD-10-CM

## 2021-03-01 DIAGNOSIS — E11.621 TYPE 2 DIABETES MELLITUS WITH FOOT ULCER: ICD-10-CM

## 2021-03-01 DIAGNOSIS — Z94.0 KIDNEY TRANSPLANT STATUS: Chronic | ICD-10-CM

## 2021-03-01 DIAGNOSIS — Z89.421 ACQUIRED ABSENCE OF OTHER RIGHT TOE(S): Chronic | ICD-10-CM

## 2021-03-01 DIAGNOSIS — N18.6 END STAGE RENAL DISEASE: Chronic | ICD-10-CM

## 2021-03-01 PROCEDURE — G0277: CPT

## 2021-03-01 PROCEDURE — 82962 GLUCOSE BLOOD TEST: CPT

## 2021-03-01 PROCEDURE — 99183 HYPERBARIC OXYGEN THERAPY: CPT

## 2021-03-02 ENCOUNTER — OUTPATIENT (OUTPATIENT)
Dept: OUTPATIENT SERVICES | Facility: HOSPITAL | Age: 56
LOS: 1 days | Discharge: HOME | End: 2021-03-02
Payer: MEDICARE

## 2021-03-02 ENCOUNTER — APPOINTMENT (OUTPATIENT)
Dept: HYPERBARIC MEDICINE | Facility: HOSPITAL | Age: 56
End: 2021-03-02
Payer: MEDICARE

## 2021-03-02 VITALS
TEMPERATURE: 97.6 F | RESPIRATION RATE: 18 BRPM | DIASTOLIC BLOOD PRESSURE: 78 MMHG | SYSTOLIC BLOOD PRESSURE: 185 MMHG | HEART RATE: 66 BPM | OXYGEN SATURATION: 96 %

## 2021-03-02 VITALS
RESPIRATION RATE: 18 BRPM | TEMPERATURE: 97.7 F | SYSTOLIC BLOOD PRESSURE: 157 MMHG | HEART RATE: 66 BPM | OXYGEN SATURATION: 95 % | DIASTOLIC BLOOD PRESSURE: 74 MMHG

## 2021-03-02 DIAGNOSIS — E11.621 TYPE 2 DIABETES MELLITUS WITH FOOT ULCER: ICD-10-CM

## 2021-03-02 DIAGNOSIS — L97.512 NON-PRESSURE CHRONIC ULCER OF OTHER PART OF RIGHT FOOT WITH FAT LAYER EXPOSED: ICD-10-CM

## 2021-03-02 DIAGNOSIS — N18.6 END STAGE RENAL DISEASE: Chronic | ICD-10-CM

## 2021-03-02 DIAGNOSIS — Z94.0 KIDNEY TRANSPLANT STATUS: Chronic | ICD-10-CM

## 2021-03-02 DIAGNOSIS — Z79.4 LONG TERM (CURRENT) USE OF INSULIN: ICD-10-CM

## 2021-03-02 DIAGNOSIS — Z89.421 ACQUIRED ABSENCE OF OTHER RIGHT TOE(S): Chronic | ICD-10-CM

## 2021-03-02 DIAGNOSIS — T86.828 OTHER COMPLICATIONS OF SKIN GRAFT (ALLOGRAFT) (AUTOGRAFT): ICD-10-CM

## 2021-03-02 PROCEDURE — 82962 GLUCOSE BLOOD TEST: CPT

## 2021-03-02 PROCEDURE — 99183 HYPERBARIC OXYGEN THERAPY: CPT

## 2021-03-02 PROCEDURE — G0277: CPT

## 2021-03-03 ENCOUNTER — OUTPATIENT (OUTPATIENT)
Dept: OUTPATIENT SERVICES | Facility: HOSPITAL | Age: 56
LOS: 1 days | Discharge: HOME | End: 2021-03-03
Payer: MEDICARE

## 2021-03-03 ENCOUNTER — APPOINTMENT (OUTPATIENT)
Dept: HYPERBARIC MEDICINE | Facility: HOSPITAL | Age: 56
End: 2021-03-03
Payer: MEDICARE

## 2021-03-03 VITALS
HEART RATE: 60 BPM | RESPIRATION RATE: 18 BRPM | TEMPERATURE: 97.3 F | OXYGEN SATURATION: 100 % | SYSTOLIC BLOOD PRESSURE: 192 MMHG | DIASTOLIC BLOOD PRESSURE: 86 MMHG

## 2021-03-03 VITALS
HEART RATE: 67 BPM | SYSTOLIC BLOOD PRESSURE: 176 MMHG | TEMPERATURE: 97.2 F | RESPIRATION RATE: 18 BRPM | DIASTOLIC BLOOD PRESSURE: 78 MMHG | OXYGEN SATURATION: 94 %

## 2021-03-03 VITALS — SYSTOLIC BLOOD PRESSURE: 187 MMHG | DIASTOLIC BLOOD PRESSURE: 83 MMHG

## 2021-03-03 DIAGNOSIS — E11.621 TYPE 2 DIABETES MELLITUS WITH FOOT ULCER: ICD-10-CM

## 2021-03-03 DIAGNOSIS — Z94.0 KIDNEY TRANSPLANT STATUS: Chronic | ICD-10-CM

## 2021-03-03 DIAGNOSIS — T86.828 OTHER COMPLICATIONS OF SKIN GRAFT (ALLOGRAFT) (AUTOGRAFT): ICD-10-CM

## 2021-03-03 DIAGNOSIS — N18.6 END STAGE RENAL DISEASE: Chronic | ICD-10-CM

## 2021-03-03 DIAGNOSIS — Z79.4 LONG TERM (CURRENT) USE OF INSULIN: ICD-10-CM

## 2021-03-03 DIAGNOSIS — Z89.421 ACQUIRED ABSENCE OF OTHER RIGHT TOE(S): Chronic | ICD-10-CM

## 2021-03-03 DIAGNOSIS — L97.512 NON-PRESSURE CHRONIC ULCER OF OTHER PART OF RIGHT FOOT WITH FAT LAYER EXPOSED: ICD-10-CM

## 2021-03-03 PROCEDURE — 82962 GLUCOSE BLOOD TEST: CPT

## 2021-03-03 PROCEDURE — G0277: CPT

## 2021-03-03 PROCEDURE — 99183 HYPERBARIC OXYGEN THERAPY: CPT

## 2021-03-03 NOTE — PROCEDURE
[Outpatient] : Outpatient [Ambulatory] : Patient is ambulatory. [THIS CHAMBER HAS BEEN CLEANED / DISINFECTED] : This chamber has been cleaned / disinfected according to local and hospital policy and procedure prior to this treatment. [Patient demonstrated and verbalized proper technique for using air break mask] : Patient demonstrated and verbalized proper technique for using air break mask [Patient educated on the risks of SMOKING prior to HBOT with understanding] : Patient educated on the risks of SMOKING prior to HBOT with understanding [Patient educated on the risks of CONSUMING ALCOHOL prior to HBOT with understanding] : Patient educated on the risks of CONSUMING ALCOHOL prior to HBOT with understanding [100% Cotton] : 100% cotton [Empty all pockets] : empty all pockets [No hair oils, wigs, hairpieces, pins] : no hair oils, wigs, hairpieces, pins  [Pre tx medications] : pre tx medications  [No make-up, creams] : no make-up, creams  [No jewelry] : no jewelry  [No matches, cigarettes, lighters] : no matches, cigarettes, lighters  [Hearing aid removed] : hearing aid removed [Dentures removed] : dentures removed [Ground bracelet on pt's wrist] : ground bracelet on pt's wrist  [Contacts removed] : contacts removed  [Remove nail polish] : remove nail polish  [No reading material] : no reading material  [Bra, undergarments removed] : bra, undergarments removed  [No contraindicated dressings] : no contraindicated dressings [Ground Wire - VISUAL Verification - Intact/Free of Obstruction] : Ground Wire - VISUAL Verification - Intact/Free of Obstruction  [Ground Continuity - Verified < 1 ohm w/ Wrist Strap Gatito] : Ground Continuity - Verified < 1 ohm w/ Wrist Strap Gatito [Number: ___] : Number: [unfilled] [Diagnosis: ___] : Diagnosis: [unfilled] [____] : Post-Dive: Time - [unfilled] [___] : Post-Dive: Value - [unfilled] mg/dL [Clear all fields] : clear all fields [] : No [FreeTextEntry4] : 100 [FreeTextEntry6] : 19 : 11 [FreeTextEntry8] : 19 : 21 [de-identified] : 19 : 51 [de-identified] : 19 : 56 [de-identified] : 20 : 26 [de-identified] : 20 : 31 [de-identified] : 21 : 01 [de-identified] : 21 : 11 [de-identified] : 120 min. [de-identified] : JAYMIE, 2/27/2021

## 2021-03-04 ENCOUNTER — APPOINTMENT (OUTPATIENT)
Dept: HYPERBARIC MEDICINE | Facility: HOSPITAL | Age: 56
End: 2021-03-04
Payer: MEDICARE

## 2021-03-04 ENCOUNTER — OUTPATIENT (OUTPATIENT)
Dept: OUTPATIENT SERVICES | Facility: HOSPITAL | Age: 56
LOS: 1 days | Discharge: HOME | End: 2021-03-04
Payer: MEDICARE

## 2021-03-04 VITALS
TEMPERATURE: 97.2 F | HEART RATE: 61 BPM | OXYGEN SATURATION: 96 % | RESPIRATION RATE: 18 BRPM | DIASTOLIC BLOOD PRESSURE: 82 MMHG | SYSTOLIC BLOOD PRESSURE: 177 MMHG

## 2021-03-04 VITALS
HEART RATE: 57 BPM | DIASTOLIC BLOOD PRESSURE: 86 MMHG | SYSTOLIC BLOOD PRESSURE: 193 MMHG | TEMPERATURE: 97.2 F | OXYGEN SATURATION: 98 % | RESPIRATION RATE: 18 BRPM

## 2021-03-04 DIAGNOSIS — Z94.0 KIDNEY TRANSPLANT STATUS: Chronic | ICD-10-CM

## 2021-03-04 DIAGNOSIS — Z89.421 ACQUIRED ABSENCE OF OTHER RIGHT TOE(S): Chronic | ICD-10-CM

## 2021-03-04 DIAGNOSIS — N18.6 END STAGE RENAL DISEASE: Chronic | ICD-10-CM

## 2021-03-04 DIAGNOSIS — T86.828 OTHER COMPLICATIONS OF SKIN GRAFT (ALLOGRAFT) (AUTOGRAFT): ICD-10-CM

## 2021-03-04 LAB — SARS-COV-2 RNA SPEC QL NAA+PROBE: SIGNIFICANT CHANGE UP

## 2021-03-04 PROCEDURE — 82962 GLUCOSE BLOOD TEST: CPT

## 2021-03-04 PROCEDURE — 99183 HYPERBARIC OXYGEN THERAPY: CPT

## 2021-03-04 PROCEDURE — G0277: CPT

## 2021-03-04 PROCEDURE — U0003: CPT

## 2021-03-04 PROCEDURE — U0005: CPT

## 2021-03-04 NOTE — ADDENDUM
[FreeTextEntry1] : PT RECEIVED OFF-LOADING MEASURES PRE HBOT\par PT RECEIVED WOUND CARE BY LPN PRE-HBOT \par PT DESCENDED TO 2.4 DEEPAK @ 2.2 PSI/MIN WITHOUT INCIDENT IN CHAMBER #4\par PT RESTING AT TX DEPTH WITH VISIBLE CHEST RISE AND FALL OBSERVED CHAMBERSIDE \par PT ASCENDED FROM TX DEPTH WITHOUT INCIDENT \par PT TOLERATED TX WELL

## 2021-03-04 NOTE — PROCEDURE
[Outpatient] : Outpatient [Ambulatory] : Patient is ambulatory. [THIS CHAMBER HAS BEEN CLEANED / DISINFECTED] : This chamber has been cleaned / disinfected according to local and hospital policy and procedure prior to this treatment. [Patient demonstrated and verbalized proper technique for using air break mask] : Patient demonstrated and verbalized proper technique for using air break mask [Patient educated on the risks of SMOKING prior to HBOT with understanding] : Patient educated on the risks of SMOKING prior to HBOT with understanding [Patient educated on the risks of CONSUMING ALCOHOL prior to HBOT with understanding] : Patient educated on the risks of CONSUMING ALCOHOL prior to HBOT with understanding [100% Cotton] : 100% cotton [Empty all pockets] : empty all pockets [No hair oils, wigs, hairpieces, pins] : no hair oils, wigs, hairpieces, pins  [Pre tx medications] : pre tx medications  [No make-up, creams] : no make-up, creams  [No jewelry] : no jewelry  [No matches, cigarettes, lighters] : no matches, cigarettes, lighters  [Hearing aid removed] : hearing aid removed [Dentures removed] : dentures removed [Ground bracelet on pt's wrist] : ground bracelet on pt's wrist  [Contacts removed] : contacts removed  [Remove nail polish] : remove nail polish  [No reading material] : no reading material  [Bra, undergarments removed] : bra, undergarments removed  [No contraindicated dressings] : no contraindicated dressings [Ground Wire - VISUAL Verification - Intact/Free of Obstruction] : Ground Wire - VISUAL Verification - Intact/Free of Obstruction  [Ground Continuity - Verified < 1 ohm w/ Wrist Strap Gatito] : Ground Continuity - Verified < 1 ohm w/ Wrist Strap Gatito [Number: ___] : Number: [unfilled] [Diagnosis: ___] : Diagnosis: [unfilled] [____] : Post-Dive: Time - [unfilled] [___] : Post-Dive: Value - [unfilled] mg/dL [Clear all fields] : clear all fields [] : No [FreeTextEntry4] : 100 [FreeTextEntry6] : 8913 [FreeTextEntry8] : 1806 [de-identified] : 1374 [de-identified] : 1640 [de-identified] : 1028 [de-identified] : 6966 [de-identified] : 0661 [de-identified] : 7157 [de-identified] : 120 minutes

## 2021-03-05 ENCOUNTER — OUTPATIENT (OUTPATIENT)
Dept: OUTPATIENT SERVICES | Facility: HOSPITAL | Age: 56
LOS: 1 days | Discharge: HOME | End: 2021-03-05
Payer: MEDICARE

## 2021-03-05 ENCOUNTER — APPOINTMENT (OUTPATIENT)
Dept: HYPERBARIC MEDICINE | Facility: HOSPITAL | Age: 56
End: 2021-03-05
Payer: MEDICARE

## 2021-03-05 VITALS
DIASTOLIC BLOOD PRESSURE: 79 MMHG | HEART RATE: 63 BPM | TEMPERATURE: 97 F | SYSTOLIC BLOOD PRESSURE: 161 MMHG | RESPIRATION RATE: 18 BRPM | OXYGEN SATURATION: 95 %

## 2021-03-05 VITALS
HEART RATE: 65 BPM | RESPIRATION RATE: 18 BRPM | DIASTOLIC BLOOD PRESSURE: 76 MMHG | TEMPERATURE: 97.8 F | OXYGEN SATURATION: 94 % | SYSTOLIC BLOOD PRESSURE: 165 MMHG

## 2021-03-05 DIAGNOSIS — Z79.4 LONG TERM (CURRENT) USE OF INSULIN: ICD-10-CM

## 2021-03-05 DIAGNOSIS — T86.828 OTHER COMPLICATIONS OF SKIN GRAFT (ALLOGRAFT) (AUTOGRAFT): ICD-10-CM

## 2021-03-05 DIAGNOSIS — E11.621 TYPE 2 DIABETES MELLITUS WITH FOOT ULCER: ICD-10-CM

## 2021-03-05 DIAGNOSIS — Z20.822 CONTACT WITH AND (SUSPECTED) EXPOSURE TO COVID-19: ICD-10-CM

## 2021-03-05 DIAGNOSIS — N18.6 END STAGE RENAL DISEASE: Chronic | ICD-10-CM

## 2021-03-05 DIAGNOSIS — Z94.0 KIDNEY TRANSPLANT STATUS: Chronic | ICD-10-CM

## 2021-03-05 DIAGNOSIS — L97.512 NON-PRESSURE CHRONIC ULCER OF OTHER PART OF RIGHT FOOT WITH FAT LAYER EXPOSED: ICD-10-CM

## 2021-03-05 DIAGNOSIS — Z89.421 ACQUIRED ABSENCE OF OTHER RIGHT TOE(S): Chronic | ICD-10-CM

## 2021-03-05 PROCEDURE — 82962 GLUCOSE BLOOD TEST: CPT

## 2021-03-05 PROCEDURE — 99183 HYPERBARIC OXYGEN THERAPY: CPT

## 2021-03-05 PROCEDURE — G0277: CPT

## 2021-03-05 NOTE — ADDENDUM
[FreeTextEntry1] : PTS INSULIN PORT WAS COVERED WITH ABD/ PAPER TAPE PRIOR TO HBOT \par PT DESCENDED TO 2.4 DEEPAK @ 2.2 PSI/MIN WITHOUT INCIDENT IN CHAMBER #1\par PT RESTING AT TX DEPTH WITH VISIBLE CHEST RISE AND FALL OBSERVED CHAMBERSIDE \par PT TOLERATED BOTH AIR BREAKS WELL \par PT ASCENDED FROM TX DEPTH WITHOUT INCIDENT \par PT TOLERATED TX WELL \par

## 2021-03-05 NOTE — PROCEDURE
[Outpatient] : Outpatient [Ambulatory] : Patient is ambulatory. [THIS CHAMBER HAS BEEN CLEANED / DISINFECTED] : This chamber has been cleaned / disinfected according to local and hospital policy and procedure prior to this treatment. [Patient demonstrated and verbalized proper technique for using air break mask] : Patient demonstrated and verbalized proper technique for using air break mask [Patient educated on the risks of SMOKING prior to HBOT with understanding] : Patient educated on the risks of SMOKING prior to HBOT with understanding [Patient educated on the risks of CONSUMING ALCOHOL prior to HBOT with understanding] : Patient educated on the risks of CONSUMING ALCOHOL prior to HBOT with understanding [100% Cotton] : 100% cotton [Empty all pockets] : empty all pockets [No hair oils, wigs, hairpieces, pins] : no hair oils, wigs, hairpieces, pins  [Pre tx medications] : pre tx medications  [No make-up, creams] : no make-up, creams  [No jewelry] : no jewelry  [No matches, cigarettes, lighters] : no matches, cigarettes, lighters  [Hearing aid removed] : hearing aid removed [Dentures removed] : dentures removed [Ground bracelet on pt's wrist] : ground bracelet on pt's wrist  [Contacts removed] : contacts removed  [Remove nail polish] : remove nail polish  [No reading material] : no reading material  [Bra, undergarments removed] : bra, undergarments removed  [No contraindicated dressings] : no contraindicated dressings [Ground Wire - VISUAL Verification - Intact/Free of Obstruction] : Ground Wire - VISUAL Verification - Intact/Free of Obstruction  [Ground Continuity - Verified < 1 ohm w/ Wrist Strap Gatito] : Ground Continuity - Verified < 1 ohm w/ Wrist Strap Gatito [Number: ___] : Number: [unfilled] [Diagnosis: ___] : Diagnosis: [unfilled] [____] : Post-Dive: Time - [unfilled] [___] : Post-Dive: Value - [unfilled] mg/dL [Clear all fields] : clear all fields [] : No [FreeTextEntry4] : 100 [FreeTextEntry6] : 5052 [FreeTextEntry8] : 5221 [de-identified] : 0117 [de-identified] : 5665 [de-identified] : 8485 [de-identified] : 4631 [de-identified] : 4528 [de-identified] : 0947 [de-identified] : 120 minutes

## 2021-03-06 ENCOUNTER — OUTPATIENT (OUTPATIENT)
Dept: OUTPATIENT SERVICES | Facility: HOSPITAL | Age: 56
LOS: 1 days | Discharge: HOME | End: 2021-03-06
Payer: MEDICARE

## 2021-03-06 ENCOUNTER — APPOINTMENT (OUTPATIENT)
Dept: HYPERBARIC MEDICINE | Facility: HOSPITAL | Age: 56
End: 2021-03-06
Payer: MEDICARE

## 2021-03-06 VITALS
HEART RATE: 64 BPM | TEMPERATURE: 97.6 F | RESPIRATION RATE: 16 BRPM | OXYGEN SATURATION: 99 % | SYSTOLIC BLOOD PRESSURE: 134 MMHG | DIASTOLIC BLOOD PRESSURE: 72 MMHG

## 2021-03-06 VITALS
SYSTOLIC BLOOD PRESSURE: 158 MMHG | OXYGEN SATURATION: 98 % | TEMPERATURE: 98.7 F | HEART RATE: 55 BPM | RESPIRATION RATE: 18 BRPM | DIASTOLIC BLOOD PRESSURE: 80 MMHG

## 2021-03-06 DIAGNOSIS — E11.621 TYPE 2 DIABETES MELLITUS WITH FOOT ULCER: ICD-10-CM

## 2021-03-06 DIAGNOSIS — Z79.4 LONG TERM (CURRENT) USE OF INSULIN: ICD-10-CM

## 2021-03-06 DIAGNOSIS — Z89.421 ACQUIRED ABSENCE OF OTHER RIGHT TOE(S): Chronic | ICD-10-CM

## 2021-03-06 DIAGNOSIS — T86.828 OTHER COMPLICATIONS OF SKIN GRAFT (ALLOGRAFT) (AUTOGRAFT): ICD-10-CM

## 2021-03-06 DIAGNOSIS — L97.512 NON-PRESSURE CHRONIC ULCER OF OTHER PART OF RIGHT FOOT WITH FAT LAYER EXPOSED: ICD-10-CM

## 2021-03-06 DIAGNOSIS — N18.6 END STAGE RENAL DISEASE: Chronic | ICD-10-CM

## 2021-03-06 DIAGNOSIS — Z94.0 KIDNEY TRANSPLANT STATUS: Chronic | ICD-10-CM

## 2021-03-06 PROCEDURE — G0277: CPT

## 2021-03-06 PROCEDURE — 82962 GLUCOSE BLOOD TEST: CPT

## 2021-03-06 PROCEDURE — 99183 HYPERBARIC OXYGEN THERAPY: CPT

## 2021-03-06 NOTE — ASSESSMENT

## 2021-03-06 NOTE — PROCEDURE
[Outpatient] : Outpatient [Ambulatory] : Patient is ambulatory. [THIS CHAMBER HAS BEEN CLEANED / DISINFECTED] : This chamber has been cleaned / disinfected according to local and hospital policy and procedure prior to this treatment. [____] : Post-Dive: Time - [unfilled] [Patient demonstrated and verbalized proper technique for using air break mask] : Patient demonstrated and verbalized proper technique for using air break mask [Patient educated on the risks of SMOKING prior to HBOT with understanding] : Patient educated on the risks of SMOKING prior to HBOT with understanding [Patient educated on the risks of CONSUMING ALCOHOL prior to HBOT with understanding] : Patient educated on the risks of CONSUMING ALCOHOL prior to HBOT with understanding [100% Cotton] : 100% cotton [Empty all pockets] : empty all pockets [No hair oils, wigs, hairpieces, pins] : no hair oils, wigs, hairpieces, pins  [Pre tx medications] : pre tx medications  [No make-up, creams] : no make-up, creams  [No jewelry] : no jewelry  [No matches, cigarettes, lighters] : no matches, cigarettes, lighters  [Hearing aid removed] : hearing aid removed [Dentures removed] : dentures removed [Ground bracelet on pt's wrist] : ground bracelet on pt's wrist  [Contacts removed] : contacts removed  [Remove nail polish] : remove nail polish  [No reading material] : no reading material  [Bra, undergarments removed] : bra, undergarments removed  [No contraindicated dressings] : no contraindicated dressings [Ground Wire - VISUAL Verification - Intact/Free of Obstruction] : Ground Wire - VISUAL Verification - Intact/Free of Obstruction  [Ground Continuity - Verified < 1 ohm w/ Wrist Strap Gatito] : Ground Continuity - Verified < 1 ohm w/ Wrist Strap Gatito [Diagnosis: ___] : Diagnosis: [unfilled] [___] : Pre-Dive: Value - [unfilled] mg/dL [Number: ___] : Number: [unfilled] [Clear all fields] : clear all fields [] : No [FreeTextEntry2] : 2.4 [FreeTextEntry4] : 10 [FreeTextEntry6] : 8813 [FreeTextEntry8] : 9629 [de-identified] : 6163 [de-identified] : 8586 [de-identified] : 2757 [de-identified] : 6870 [de-identified] : 7061 [de-identified] : 1007 [de-identified] : 120 minutes

## 2021-03-06 NOTE — ADDENDUM
[FreeTextEntry1] : PT GIVEN OFF-LOADING MEASUREMENTS PRE-HBOT \par PTS INSULIN PORT COVERED WITH ABD/PAPER TAPE PRIOR TO HBOT \par PT DESCENDED TO 2.4 DEEPAK @ 2.2 PSI/MIN WITHOUT INCIDENT IN CHAMBER #4\par PT RESTING AT TX DEPTH WITH VISIBLE CHEST RISE AND FALL OBSERVED CHAMBERSIDE \par PT ASCENDED FROM TX DEPTH WITHOUT INCIDENT \par PT RECEIVED  ACE WRAP  BY LPN POST HBOT\par PT TOLERATED TX WELL \par

## 2021-03-08 ENCOUNTER — OUTPATIENT (OUTPATIENT)
Dept: OUTPATIENT SERVICES | Facility: HOSPITAL | Age: 56
LOS: 1 days | Discharge: HOME | End: 2021-03-08
Payer: MEDICARE

## 2021-03-08 ENCOUNTER — APPOINTMENT (OUTPATIENT)
Dept: HYPERBARIC MEDICINE | Facility: HOSPITAL | Age: 56
End: 2021-03-08
Payer: MEDICARE

## 2021-03-08 VITALS
DIASTOLIC BLOOD PRESSURE: 84 MMHG | TEMPERATURE: 98.7 F | RESPIRATION RATE: 20 BRPM | OXYGEN SATURATION: 97 % | HEART RATE: 65 BPM | SYSTOLIC BLOOD PRESSURE: 194 MMHG

## 2021-03-08 VITALS
SYSTOLIC BLOOD PRESSURE: 184 MMHG | OXYGEN SATURATION: 97 % | DIASTOLIC BLOOD PRESSURE: 80 MMHG | TEMPERATURE: 97 F | HEART RATE: 69 BPM | RESPIRATION RATE: 18 BRPM

## 2021-03-08 DIAGNOSIS — T86.828 OTHER COMPLICATIONS OF SKIN GRAFT (ALLOGRAFT) (AUTOGRAFT): ICD-10-CM

## 2021-03-08 DIAGNOSIS — Z94.0 KIDNEY TRANSPLANT STATUS: Chronic | ICD-10-CM

## 2021-03-08 DIAGNOSIS — L97.512 NON-PRESSURE CHRONIC ULCER OF OTHER PART OF RIGHT FOOT WITH FAT LAYER EXPOSED: ICD-10-CM

## 2021-03-08 DIAGNOSIS — Z79.4 LONG TERM (CURRENT) USE OF INSULIN: ICD-10-CM

## 2021-03-08 DIAGNOSIS — N18.6 END STAGE RENAL DISEASE: Chronic | ICD-10-CM

## 2021-03-08 DIAGNOSIS — Z89.421 ACQUIRED ABSENCE OF OTHER RIGHT TOE(S): Chronic | ICD-10-CM

## 2021-03-08 DIAGNOSIS — E11.621 TYPE 2 DIABETES MELLITUS WITH FOOT ULCER: ICD-10-CM

## 2021-03-08 PROCEDURE — 99183 HYPERBARIC OXYGEN THERAPY: CPT

## 2021-03-08 PROCEDURE — G0277: CPT

## 2021-03-08 PROCEDURE — 82962 GLUCOSE BLOOD TEST: CPT

## 2021-03-08 NOTE — ADDENDUM
[FreeTextEntry1] : PT GIVEN OFF LOADING MEASURES PRE HBOT \par PTS INSULIN PORT COVERED WITH ABD/ PAPER TAPE PRIOR TO HBOT \par PT DESCENDED TO 2.4 DEEPAK @ 2.2 PSI/MIN WITHOUT INCIDENT IN CHAMBER #2\par PT RESTING AT TX DEPTH WITH VISIBLE CHEST RISE AND FALL OBSERVED CHAMBERSIDE \par PT ASCENDED FROM TX DEPTH WITHOUT INCIDENT \par PT RECEIVED WOUND CARE BY RN POST HBOT \par PT TOLERATED TX WELL

## 2021-03-08 NOTE — PROCEDURE
[Outpatient] : Outpatient [Ambulatory] : Patient is ambulatory. [THIS CHAMBER HAS BEEN CLEANED / DISINFECTED] : This chamber has been cleaned / disinfected according to local and hospital policy and procedure prior to this treatment. [Patient demonstrated and verbalized proper technique for using air break mask] : Patient demonstrated and verbalized proper technique for using air break mask [Patient educated on the risks of SMOKING prior to HBOT with understanding] : Patient educated on the risks of SMOKING prior to HBOT with understanding [Patient educated on the risks of CONSUMING ALCOHOL prior to HBOT with understanding] : Patient educated on the risks of CONSUMING ALCOHOL prior to HBOT with understanding [100% Cotton] : 100% cotton [Empty all pockets] : empty all pockets [No hair oils, wigs, hairpieces, pins] : no hair oils, wigs, hairpieces, pins  [Pre tx medications] : pre tx medications  [No make-up, creams] : no make-up, creams  [No jewelry] : no jewelry  [No matches, cigarettes, lighters] : no matches, cigarettes, lighters  [Hearing aid removed] : hearing aid removed [Dentures removed] : dentures removed [Ground bracelet on pt's wrist] : ground bracelet on pt's wrist  [Contacts removed] : contacts removed  [Remove nail polish] : remove nail polish  [No reading material] : no reading material  [Bra, undergarments removed] : bra, undergarments removed  [No contraindicated dressings] : no contraindicated dressings [Ground Wire - VISUAL Verification - Intact/Free of Obstruction] : Ground Wire - VISUAL Verification - Intact/Free of Obstruction  [Ground Continuity - Verified < 1 ohm w/ Wrist Strap Gatito] : Ground Continuity - Verified < 1 ohm w/ Wrist Strap Gatito [Number: ___] : Number: [unfilled] [Diagnosis: ___] : Diagnosis: [unfilled] [____] : Post-Dive: Time - [unfilled] [___] : Post-Dive: Value - [unfilled] mg/dL [Clear all fields] : clear all fields [] : No [FreeTextEntry4] : 100 [FreeTextEntry6] : 3304 [FreeTextEntry8] : 1600 [de-identified] : 7015 [de-identified] : 4276 [de-identified] : 1164 [de-identified] : 6129 [de-identified] : 3415 [de-identified] : 8788 [de-identified] : 120 minutes

## 2021-03-09 ENCOUNTER — APPOINTMENT (OUTPATIENT)
Dept: HYPERBARIC MEDICINE | Facility: HOSPITAL | Age: 56
End: 2021-03-09
Payer: MEDICARE

## 2021-03-09 ENCOUNTER — OUTPATIENT (OUTPATIENT)
Dept: OUTPATIENT SERVICES | Facility: HOSPITAL | Age: 56
LOS: 1 days | Discharge: HOME | End: 2021-03-09
Payer: MEDICARE

## 2021-03-09 ENCOUNTER — TRANSCRIPTION ENCOUNTER (OUTPATIENT)
Age: 56
End: 2021-03-09

## 2021-03-09 VITALS
OXYGEN SATURATION: 98 % | DIASTOLIC BLOOD PRESSURE: 76 MMHG | TEMPERATURE: 97 F | RESPIRATION RATE: 18 BRPM | HEART RATE: 66 BPM | SYSTOLIC BLOOD PRESSURE: 171 MMHG

## 2021-03-09 VITALS
HEART RATE: 60 BPM | SYSTOLIC BLOOD PRESSURE: 176 MMHG | DIASTOLIC BLOOD PRESSURE: 79 MMHG | TEMPERATURE: 97.3 F | OXYGEN SATURATION: 98 % | RESPIRATION RATE: 18 BRPM

## 2021-03-09 DIAGNOSIS — Z79.4 LONG TERM (CURRENT) USE OF INSULIN: ICD-10-CM

## 2021-03-09 DIAGNOSIS — E11.621 TYPE 2 DIABETES MELLITUS WITH FOOT ULCER: ICD-10-CM

## 2021-03-09 DIAGNOSIS — L97.512 NON-PRESSURE CHRONIC ULCER OF OTHER PART OF RIGHT FOOT WITH FAT LAYER EXPOSED: ICD-10-CM

## 2021-03-09 DIAGNOSIS — Z94.0 KIDNEY TRANSPLANT STATUS: Chronic | ICD-10-CM

## 2021-03-09 DIAGNOSIS — Z89.421 ACQUIRED ABSENCE OF OTHER RIGHT TOE(S): Chronic | ICD-10-CM

## 2021-03-09 DIAGNOSIS — T86.828 OTHER COMPLICATIONS OF SKIN GRAFT (ALLOGRAFT) (AUTOGRAFT): ICD-10-CM

## 2021-03-09 DIAGNOSIS — N18.6 END STAGE RENAL DISEASE: Chronic | ICD-10-CM

## 2021-03-09 PROCEDURE — 99183 HYPERBARIC OXYGEN THERAPY: CPT

## 2021-03-09 PROCEDURE — G0277: CPT

## 2021-03-09 PROCEDURE — 82962 GLUCOSE BLOOD TEST: CPT

## 2021-03-09 NOTE — PROCEDURE
[Outpatient] : Outpatient [Ambulatory] : Patient is ambulatory. [THIS CHAMBER HAS BEEN CLEANED / DISINFECTED] : This chamber has been cleaned / disinfected according to local and hospital policy and procedure prior to this treatment. [Patient demonstrated and verbalized proper technique for using air break mask] : Patient demonstrated and verbalized proper technique for using air break mask [Patient educated on the risks of SMOKING prior to HBOT with understanding] : Patient educated on the risks of SMOKING prior to HBOT with understanding [Patient educated on the risks of CONSUMING ALCOHOL prior to HBOT with understanding] : Patient educated on the risks of CONSUMING ALCOHOL prior to HBOT with understanding [100% Cotton] : 100% cotton [Empty all pockets] : empty all pockets [No hair oils, wigs, hairpieces, pins] : no hair oils, wigs, hairpieces, pins  [Pre tx medications] : pre tx medications  [No make-up, creams] : no make-up, creams  [No jewelry] : no jewelry  [No matches, cigarettes, lighters] : no matches, cigarettes, lighters  [Hearing aid removed] : hearing aid removed [Dentures removed] : dentures removed [Ground bracelet on pt's wrist] : ground bracelet on pt's wrist  [Contacts removed] : contacts removed  [Remove nail polish] : remove nail polish  [No reading material] : no reading material  [Bra, undergarments removed] : bra, undergarments removed  [No contraindicated dressings] : no contraindicated dressings [Ground Wire - VISUAL Verification - Intact/Free of Obstruction] : Ground Wire - VISUAL Verification - Intact/Free of Obstruction  [Ground Continuity - Verified < 1 ohm w/ Wrist Strap Gatito] : Ground Continuity - Verified < 1 ohm w/ Wrist Strap Gatito [Number: ___] : Number: [unfilled] [Diagnosis: ___] : Diagnosis: [unfilled] [____] : Post-Dive: Time - [unfilled] [___] : Post-Dive: Value - [unfilled] mg/dL [Clear all fields] : clear all fields [] : No [FreeTextEntry4] : 100 [FreeTextEntry6] : 5705 [FreeTextEntry8] : 7215 [de-identified] : 4548 [de-identified] : 5124 [de-identified] : 8717 [de-identified] : 5357 [de-identified] : 4392 [de-identified] : 2625 [de-identified] : 120 minutes

## 2021-03-09 NOTE — PROCEDURE
[Outpatient] : Outpatient [Ambulatory] : Patient is ambulatory. [THIS CHAMBER HAS BEEN CLEANED / DISINFECTED] : This chamber has been cleaned / disinfected according to local and hospital policy and procedure prior to this treatment. [Patient demonstrated and verbalized proper technique for using air break mask] : Patient demonstrated and verbalized proper technique for using air break mask [Patient educated on the risks of SMOKING prior to HBOT with understanding] : Patient educated on the risks of SMOKING prior to HBOT with understanding [Patient educated on the risks of CONSUMING ALCOHOL prior to HBOT with understanding] : Patient educated on the risks of CONSUMING ALCOHOL prior to HBOT with understanding [100% Cotton] : 100% cotton [Empty all pockets] : empty all pockets [No hair oils, wigs, hairpieces, pins] : no hair oils, wigs, hairpieces, pins  [Pre tx medications] : pre tx medications  [No make-up, creams] : no make-up, creams  [No jewelry] : no jewelry  [No matches, cigarettes, lighters] : no matches, cigarettes, lighters  [Hearing aid removed] : hearing aid removed [Dentures removed] : dentures removed [Ground bracelet on pt's wrist] : ground bracelet on pt's wrist  [Contacts removed] : contacts removed  [Remove nail polish] : remove nail polish  [No reading material] : no reading material  [Bra, undergarments removed] : bra, undergarments removed  [No contraindicated dressings] : no contraindicated dressings [Ground Wire - VISUAL Verification - Intact/Free of Obstruction] : Ground Wire - VISUAL Verification - Intact/Free of Obstruction  [Ground Continuity - Verified < 1 ohm w/ Wrist Strap Gatito] : Ground Continuity - Verified < 1 ohm w/ Wrist Strap Gatito [Number: ___] : Number: [unfilled] [Diagnosis: ___] : Diagnosis: [unfilled] [____] : Post-Dive: Time - [unfilled] [___] : Post-Dive: Value - [unfilled] mg/dL [Clear all fields] : clear all fields [] : No [FreeTextEntry9] : 1 [FreeTextEntry4] : 100 [FreeTextEntry6] : 1252 [FreeTextEntry8] : 5160 [de-identified] : 4938 [de-identified] : 8202 [de-identified] : 0458 [de-identified] : 0807 [de-identified] : 0408 [de-identified] : 6696 [de-identified] : 120 minutes

## 2021-03-09 NOTE — ADDENDUM
[FreeTextEntry1] : PT GIVEN OFF-LOADING MEASUREMENTS PRE-HBOT \par PTS INSULIN PORT COVERED WITH ABD/PAPER TAPE PRIOR TO HBOT \par PT DESCENDED TO 2.4 DEEPAK @ 2.2 PSI/MIN WITHOUT INCIDENT IN CHAMBER #4\par PT RESTING AT TX DEPTH WITH VISIBLE CHEST RISE AND FALL OBSERVED CHAMBERSIDE \par PT ASCENDED FROM TX DEPTH WITHOUT INCIDENT \par PT RECEIVED WOUND CARE BY LPN POST HBOT\par PT TOLERATED TX WELL \par

## 2021-03-09 NOTE — PROCEDURE
[Outpatient] : Outpatient [Ambulatory] : Patient is ambulatory. [THIS CHAMBER HAS BEEN CLEANED / DISINFECTED] : This chamber has been cleaned / disinfected according to local and hospital policy and procedure prior to this treatment. [Patient demonstrated and verbalized proper technique for using air break mask] : Patient demonstrated and verbalized proper technique for using air break mask [Patient educated on the risks of SMOKING prior to HBOT with understanding] : Patient educated on the risks of SMOKING prior to HBOT with understanding [Patient educated on the risks of CONSUMING ALCOHOL prior to HBOT with understanding] : Patient educated on the risks of CONSUMING ALCOHOL prior to HBOT with understanding [100% Cotton] : 100% cotton [Empty all pockets] : empty all pockets [No hair oils, wigs, hairpieces, pins] : no hair oils, wigs, hairpieces, pins  [Pre tx medications] : pre tx medications  [No make-up, creams] : no make-up, creams  [No jewelry] : no jewelry  [No matches, cigarettes, lighters] : no matches, cigarettes, lighters  [Hearing aid removed] : hearing aid removed [Dentures removed] : dentures removed [Ground bracelet on pt's wrist] : ground bracelet on pt's wrist  [Contacts removed] : contacts removed  [Remove nail polish] : remove nail polish  [No reading material] : no reading material  [Bra, undergarments removed] : bra, undergarments removed  [No contraindicated dressings] : no contraindicated dressings [Ground Wire - VISUAL Verification - Intact/Free of Obstruction] : Ground Wire - VISUAL Verification - Intact/Free of Obstruction  [Ground Continuity - Verified < 1 ohm w/ Wrist Strap Gatito] : Ground Continuity - Verified < 1 ohm w/ Wrist Strap Gatito [Number: ___] : Number: [unfilled] [Diagnosis: ___] : Diagnosis: [unfilled] [____] : Post-Dive: Time - [unfilled] [___] : Post-Dive: Value - [unfilled] mg/dL [Clear all fields] : clear all fields [] : No [FreeTextEntry4] : 100 [FreeTextEntry6] : 8673 [FreeTextEntry8] : 0789 [de-identified] : 0655 [de-identified] : 9103 [de-identified] : 9571 [de-identified] : 1902 [de-identified] : 1800 [de-identified] : 8765 [de-identified] : 120 minutes

## 2021-03-09 NOTE — ADDENDUM
[FreeTextEntry1] : PT RECEIVED OFF-LOADING MEASURES PRE- HBOT\par PTS INSULIN PORT COVERED WITH ABD/ PAPER TAPE PRE HBOT \par PT DESCENDED TO 2.4 DEEPAK @ 2.2 PSI/MIN WITHOUT INCIDENT IN CHAMBER #2\par PT RESTING AT TX DEPTH WITH VISIBLE CHEST RISE AND FALL OBSERVED CHAMBERSIDE \par PT ASCENDED FROM TX DEPTH WITHOUT INCIDENT \par PT TOLERATED TX WELL

## 2021-03-09 NOTE — ADDENDUM
[FreeTextEntry1] : PT DESCENDED TO 2.4 DEEPAK @ 2.2 PSI/MIN WITHOUT INCIDENT IN CHAMBER # 4. PT'S RESTING AT TX DEPTH WITH CHEST RISE AND FALL OBSERVED CHAMBER SIDE. \par PT TOLERATED AIR BREAKS WELL.\par CARE TRANSFERRED TO TREATING TECH @ 1600 PM\par PT ASCENDED FROM TX DEPOTH WITHOUT INCIDENT \par PT TOLERATED TX WELL

## 2021-03-10 ENCOUNTER — APPOINTMENT (OUTPATIENT)
Dept: HYPERBARIC MEDICINE | Facility: HOSPITAL | Age: 56
End: 2021-03-10
Payer: MEDICARE

## 2021-03-10 ENCOUNTER — OUTPATIENT (OUTPATIENT)
Dept: OUTPATIENT SERVICES | Facility: HOSPITAL | Age: 56
LOS: 1 days | Discharge: HOME | End: 2021-03-10
Payer: MEDICARE

## 2021-03-10 VITALS
TEMPERATURE: 97 F | OXYGEN SATURATION: 98 % | RESPIRATION RATE: 18 BRPM | SYSTOLIC BLOOD PRESSURE: 186 MMHG | HEART RATE: 75 BPM | DIASTOLIC BLOOD PRESSURE: 89 MMHG

## 2021-03-10 VITALS
RESPIRATION RATE: 18 BRPM | OXYGEN SATURATION: 94 % | HEART RATE: 72 BPM | SYSTOLIC BLOOD PRESSURE: 190 MMHG | TEMPERATURE: 97.8 F | DIASTOLIC BLOOD PRESSURE: 85 MMHG

## 2021-03-10 DIAGNOSIS — Z79.4 LONG TERM (CURRENT) USE OF INSULIN: ICD-10-CM

## 2021-03-10 DIAGNOSIS — E11.621 TYPE 2 DIABETES MELLITUS WITH FOOT ULCER: ICD-10-CM

## 2021-03-10 DIAGNOSIS — Z89.421 ACQUIRED ABSENCE OF OTHER RIGHT TOE(S): Chronic | ICD-10-CM

## 2021-03-10 DIAGNOSIS — T86.828 OTHER COMPLICATIONS OF SKIN GRAFT (ALLOGRAFT) (AUTOGRAFT): ICD-10-CM

## 2021-03-10 DIAGNOSIS — N18.6 END STAGE RENAL DISEASE: Chronic | ICD-10-CM

## 2021-03-10 DIAGNOSIS — Z94.0 KIDNEY TRANSPLANT STATUS: Chronic | ICD-10-CM

## 2021-03-10 DIAGNOSIS — L97.512 NON-PRESSURE CHRONIC ULCER OF OTHER PART OF RIGHT FOOT WITH FAT LAYER EXPOSED: ICD-10-CM

## 2021-03-10 PROCEDURE — 99183 HYPERBARIC OXYGEN THERAPY: CPT

## 2021-03-10 PROCEDURE — 82962 GLUCOSE BLOOD TEST: CPT

## 2021-03-10 PROCEDURE — G0277: CPT

## 2021-03-11 ENCOUNTER — OUTPATIENT (OUTPATIENT)
Dept: OUTPATIENT SERVICES | Facility: HOSPITAL | Age: 56
LOS: 1 days | Discharge: HOME | End: 2021-03-11
Payer: MEDICARE

## 2021-03-11 ENCOUNTER — APPOINTMENT (OUTPATIENT)
Dept: HYPERBARIC MEDICINE | Facility: HOSPITAL | Age: 56
End: 2021-03-11
Payer: MEDICARE

## 2021-03-11 VITALS
TEMPERATURE: 97.7 F | HEART RATE: 66 BPM | RESPIRATION RATE: 18 BRPM | DIASTOLIC BLOOD PRESSURE: 74 MMHG | SYSTOLIC BLOOD PRESSURE: 155 MMHG | OXYGEN SATURATION: 97 %

## 2021-03-11 VITALS
OXYGEN SATURATION: 94 % | HEART RATE: 72 BPM | DIASTOLIC BLOOD PRESSURE: 78 MMHG | RESPIRATION RATE: 20 BRPM | SYSTOLIC BLOOD PRESSURE: 170 MMHG | TEMPERATURE: 98.2 F

## 2021-03-11 DIAGNOSIS — T86.828 OTHER COMPLICATIONS OF SKIN GRAFT (ALLOGRAFT) (AUTOGRAFT): ICD-10-CM

## 2021-03-11 DIAGNOSIS — Z94.0 KIDNEY TRANSPLANT STATUS: Chronic | ICD-10-CM

## 2021-03-11 DIAGNOSIS — N18.6 END STAGE RENAL DISEASE: Chronic | ICD-10-CM

## 2021-03-11 DIAGNOSIS — Z89.421 ACQUIRED ABSENCE OF OTHER RIGHT TOE(S): Chronic | ICD-10-CM

## 2021-03-11 LAB — SARS-COV-2 RNA SPEC QL NAA+PROBE: SIGNIFICANT CHANGE UP

## 2021-03-11 PROCEDURE — 82962 GLUCOSE BLOOD TEST: CPT

## 2021-03-11 PROCEDURE — 99183 HYPERBARIC OXYGEN THERAPY: CPT

## 2021-03-11 PROCEDURE — G0277: CPT

## 2021-03-11 PROCEDURE — U0003: CPT

## 2021-03-11 PROCEDURE — U0005: CPT

## 2021-03-11 NOTE — ADDENDUM
[FreeTextEntry1] : PT RECEIVED OFF-LOADING MEASURES PRE-HBOT\par PTS INSULIN PORT COVERED BY ABD/PAPER TAPE PRE HBOT \par PT DESCENDED TO 2.4 DEEPAK @ 2.2 PSI/MIN WITHOUT INCIDENT IN CHAMBER #2\par PT RESTING AT TX DEPTH WITH VISIBLE CHEST RISE AND FALL OBSERVED CHAMBERSIDE \par PT ASCENDED FROM TX DEPTH WITHOUT INCIDENT \par PT RECEIVED COVID-19 OPCR SWAB BY LPN POST HBOT \par PT TOLERATED TX WELL

## 2021-03-11 NOTE — PROCEDURE
[Outpatient] : Outpatient [Ambulatory] : Patient is ambulatory. [THIS CHAMBER HAS BEEN CLEANED / DISINFECTED] : This chamber has been cleaned / disinfected according to local and hospital policy and procedure prior to this treatment. [Patient demonstrated and verbalized proper technique for using air break mask] : Patient demonstrated and verbalized proper technique for using air break mask [Patient educated on the risks of SMOKING prior to HBOT with understanding] : Patient educated on the risks of SMOKING prior to HBOT with understanding [Patient educated on the risks of CONSUMING ALCOHOL prior to HBOT with understanding] : Patient educated on the risks of CONSUMING ALCOHOL prior to HBOT with understanding [100% Cotton] : 100% cotton [Empty all pockets] : empty all pockets [No hair oils, wigs, hairpieces, pins] : no hair oils, wigs, hairpieces, pins  [Pre tx medications] : pre tx medications  [No make-up, creams] : no make-up, creams  [No jewelry] : no jewelry  [No matches, cigarettes, lighters] : no matches, cigarettes, lighters  [Hearing aid removed] : hearing aid removed [Dentures removed] : dentures removed [Ground bracelet on pt's wrist] : ground bracelet on pt's wrist  [Contacts removed] : contacts removed  [Remove nail polish] : remove nail polish  [No reading material] : no reading material  [Bra, undergarments removed] : bra, undergarments removed  [No contraindicated dressings] : no contraindicated dressings [Ground Wire - VISUAL Verification - Intact/Free of Obstruction] : Ground Wire - VISUAL Verification - Intact/Free of Obstruction  [Ground Continuity - Verified < 1 ohm w/ Wrist Strap Gatito] : Ground Continuity - Verified < 1 ohm w/ Wrist Strap Gatito [Number: ___] : Number: [unfilled] [Diagnosis: ___] : Diagnosis: [unfilled] [____] : Post-Dive: Time - [unfilled] [___] : Post-Dive: Value - [unfilled] mg/dL [Clear all fields] : clear all fields [] : No [FreeTextEntry4] : 100 [FreeTextEntry6] : 7191 [FreeTextEntry8] : 8223 [de-identified] : 9205 [de-identified] : 0331 [de-identified] : 3098 [de-identified] : 0978 [de-identified] : 1186 [de-identified] : 2993 [de-identified] : 120 minutes

## 2021-03-11 NOTE — PROCEDURE
[Outpatient] : Outpatient [Ambulatory] : Patient is ambulatory. [THIS CHAMBER HAS BEEN CLEANED / DISINFECTED] : This chamber has been cleaned / disinfected according to local and hospital policy and procedure prior to this treatment. [Patient demonstrated and verbalized proper technique for using air break mask] : Patient demonstrated and verbalized proper technique for using air break mask [Patient educated on the risks of SMOKING prior to HBOT with understanding] : Patient educated on the risks of SMOKING prior to HBOT with understanding [Patient educated on the risks of CONSUMING ALCOHOL prior to HBOT with understanding] : Patient educated on the risks of CONSUMING ALCOHOL prior to HBOT with understanding [100% Cotton] : 100% cotton [Empty all pockets] : empty all pockets [No hair oils, wigs, hairpieces, pins] : no hair oils, wigs, hairpieces, pins  [Pre tx medications] : pre tx medications  [No make-up, creams] : no make-up, creams  [No jewelry] : no jewelry  [No matches, cigarettes, lighters] : no matches, cigarettes, lighters  [Hearing aid removed] : hearing aid removed [Dentures removed] : dentures removed [Ground bracelet on pt's wrist] : ground bracelet on pt's wrist  [Contacts removed] : contacts removed  [Remove nail polish] : remove nail polish  [No reading material] : no reading material  [Bra, undergarments removed] : bra, undergarments removed  [No contraindicated dressings] : no contraindicated dressings [Ground Wire - VISUAL Verification - Intact/Free of Obstruction] : Ground Wire - VISUAL Verification - Intact/Free of Obstruction  [Ground Continuity - Verified < 1 ohm w/ Wrist Strap Gatito] : Ground Continuity - Verified < 1 ohm w/ Wrist Strap Gatito [Number: ___] : Number: [unfilled] [Diagnosis: ___] : Diagnosis: [unfilled] [____] : Post-Dive: Time - [unfilled] [___] : Post-Dive: Value - [unfilled] mg/dL [Clear all fields] : clear all fields [] : No [FreeTextEntry4] : 100 [FreeTextEntry6] : 1816 [FreeTextEntry8] : 1822 [de-identified] : 8393 [de-identified] : 1855 [de-identified] : 1927 [de-identified] : 1932 [de-identified] : 2002 [de-identified] : 2012 [de-identified] : 120 minutes

## 2021-03-11 NOTE — ADDENDUM
[FreeTextEntry1] : PT'S BLOOD PRESSURE HIGH PRIOR TO DESCENT. IMMEDIATE RE-TEST PERFORMED. PT'S BLOOD PRESSURE WITHIN DESIRED RANGE FOR HBOT. PT PUT IN CHAMBER.\par PT DESCENDED TO 2.4 DEEPAK @ 2.2 PSI/MIN WITHOUT INCIDENT IN CHAMBER # 3. PT'S RESTING AT TX DEPTH WITH LEGS ELEVATED. CHEST RISE AND FALL OBSERVED CHAMBER SIDE.\par PT TOLERATED AIR BREAKS WELL. \par CARE TRANSFERRED TO [facility] TECH @ 1900 PM\par PT ASCENDED FROM TX DEPTH WITHOUT INCIDENT \par PT RECEIVED WC BY RN POST HBOT \par PT TOLERATED TX WELL

## 2021-03-12 ENCOUNTER — APPOINTMENT (OUTPATIENT)
Dept: HYPERBARIC MEDICINE | Facility: HOSPITAL | Age: 56
End: 2021-03-12
Payer: MEDICARE

## 2021-03-12 ENCOUNTER — OUTPATIENT (OUTPATIENT)
Dept: OUTPATIENT SERVICES | Facility: HOSPITAL | Age: 56
LOS: 1 days | Discharge: HOME | End: 2021-03-12
Payer: MEDICARE

## 2021-03-12 VITALS
DIASTOLIC BLOOD PRESSURE: 72 MMHG | RESPIRATION RATE: 20 BRPM | HEIGHT: 73 IN | SYSTOLIC BLOOD PRESSURE: 141 MMHG | BODY MASS INDEX: 41.75 KG/M2 | TEMPERATURE: 98.1 F | OXYGEN SATURATION: 95 % | HEART RATE: 59 BPM | WEIGHT: 315 LBS

## 2021-03-12 DIAGNOSIS — L97.512 NON-PRESSURE CHRONIC ULCER OF OTHER PART OF RIGHT FOOT WITH FAT LAYER EXPOSED: ICD-10-CM

## 2021-03-12 DIAGNOSIS — Z94.0 KIDNEY TRANSPLANT STATUS: Chronic | ICD-10-CM

## 2021-03-12 DIAGNOSIS — Z79.4 LONG TERM (CURRENT) USE OF INSULIN: ICD-10-CM

## 2021-03-12 DIAGNOSIS — N18.6 END STAGE RENAL DISEASE: Chronic | ICD-10-CM

## 2021-03-12 DIAGNOSIS — Z89.421 ACQUIRED ABSENCE OF OTHER RIGHT TOE(S): Chronic | ICD-10-CM

## 2021-03-12 DIAGNOSIS — Z20.822 CONTACT WITH AND (SUSPECTED) EXPOSURE TO COVID-19: ICD-10-CM

## 2021-03-12 DIAGNOSIS — E11.621 TYPE 2 DIABETES MELLITUS WITH FOOT ULCER: ICD-10-CM

## 2021-03-12 DIAGNOSIS — T86.828 OTHER COMPLICATIONS OF SKIN GRAFT (ALLOGRAFT) (AUTOGRAFT): ICD-10-CM

## 2021-03-12 PROCEDURE — 99213 OFFICE O/P EST LOW 20 MIN: CPT

## 2021-03-12 PROCEDURE — G0463: CPT

## 2021-03-12 NOTE — ASSESSMENT
[Verbal] : Verbal [Patient] : Patient [Good - alert, interested, motivated] : Good - alert, interested, motivated [Verbalizes knowledge/Understanding] : Verbalizes knowledge/understanding [Dressing changes] : dressing changes [Foot Care] : foot care [Skin Care] : skin care [Signs and symptoms of infection] : sign and symptoms of infection [How and When to Call] : how and when to call [Hyperbaric Therapy] : hyperbaric therapy [Off-loading] : off-loading [Patient responsibility to plan of care] : patient responsibility to plan of care [] : Yes [Stable] : stable [Home] : Home [Ambulatory] : Ambulatory [Not Applicable - Long Term Care/Home Health Agency] : Long Term Care/Home Health Agency: Not Applicable [Pressure relief] : pressure relief [Venous Disease] : venous disease [Nutrition] : nutrition [Pain Management] : pain management [Compression Therapy] : compression therapy [Glycemic Control] : glycemic control [FreeTextEntry2] : Infection Prevention\par Promote Skin Integrity\par Offloading\par Elevation\par Compression Compliance\par Low Na+ Diet\par Maintain acceptable levels of pain\par Demonstrates use of both pharmacological and nonpharmacological pain management interventions\par HBOT\par Encourage Glycemic Control\par Weight Reduction strategies [FreeTextEntry4] : F/U 2 weeks for assessment and daily for HBOT

## 2021-03-12 NOTE — HISTORY OF PRESENT ILLNESS
[FreeTextEntry1] : Pt seen s/p right 3rd digit distal amputation. Pt currently in HBOT at this time. Denies any other complaints at this time.

## 2021-03-12 NOTE — PLAN
[FreeTextEntry1] : Patient examined and evaluated at this time.\par Continue local wound care and offloading.\par Sutures removed and steri-strips applied.\par Patient is a HBOT candidate and will continue treatment.\par Spent 20 minutes for patient care and medical decision making.\par

## 2021-03-12 NOTE — REVIEW OF SYSTEMS
[Skin Wound] : skin wound [Fever] : no fever [Eye Pain] : no eye pain [Chest Pain] : no chest pain [Shortness Of Breath] : no shortness of breath [Cough] : no cough [Abdominal Pain] : no abdominal pain [FreeTextEntry4] : implant [FreeTextEntry9] : s/p right 3rd digit distal amputation [de-identified] : right 3rd digit distal amputation site with sutures intact [de-identified] : diabetic neuropathy [de-identified] : type 2 diabetes

## 2021-03-12 NOTE — PHYSICAL EXAM
[4 x 4] : 4 x 4  [0] : left 0 [Skin Ulcer] : ulcer [Calm] : calm [Ankle Swelling (On Exam)] : not present [Varicose Veins Of Lower Extremities] : not present [] : not present [de-identified] : A&Ox3, NAD [de-identified] : s/p right 3rd digit distal amputation [de-identified] : right 3rd digit distal amputation site with sutures intact [de-identified] : loss of light touch sensation bilaterally [de-identified] : Sutures Removed by DPM\par Circ neurovascular function WNL post ace wraps, pt expressed comfort.\par  [FreeTextEntry1] : Right 3rd Distal digit Amp site [FreeTextEntry2] : 2.7 [FreeTextEntry3] : 0.1 [FreeTextEntry4] : 0.1 [de-identified] : dry eschar/scab [de-identified] : Silver Alginate [de-identified] : Cleansed with Normal Saline\par  [TWNoteComboBox4] : None [TWNoteComboBox5] : No [TWNoteComboBox6] : Surgical [de-identified] : No [de-identified] : other [de-identified] : None [de-identified] : None [de-identified] : 100% [de-identified] : No [de-identified] : Ace wraps [de-identified] : 3x Weekly [de-identified] : Primary Dressing

## 2021-03-13 ENCOUNTER — APPOINTMENT (OUTPATIENT)
Dept: HYPERBARIC MEDICINE | Facility: HOSPITAL | Age: 56
End: 2021-03-13
Payer: MEDICARE

## 2021-03-13 ENCOUNTER — OUTPATIENT (OUTPATIENT)
Dept: OUTPATIENT SERVICES | Facility: HOSPITAL | Age: 56
LOS: 1 days | Discharge: HOME | End: 2021-03-13
Payer: MEDICARE

## 2021-03-13 VITALS
TEMPERATURE: 98.1 F | OXYGEN SATURATION: 98 % | HEART RATE: 66 BPM | SYSTOLIC BLOOD PRESSURE: 141 MMHG | DIASTOLIC BLOOD PRESSURE: 69 MMHG | RESPIRATION RATE: 16 BRPM

## 2021-03-13 VITALS
OXYGEN SATURATION: 98 % | HEART RATE: 90 BPM | SYSTOLIC BLOOD PRESSURE: 148 MMHG | DIASTOLIC BLOOD PRESSURE: 74 MMHG | TEMPERATURE: 97.7 F | RESPIRATION RATE: 16 BRPM

## 2021-03-13 DIAGNOSIS — E11.621 TYPE 2 DIABETES MELLITUS WITH FOOT ULCER: ICD-10-CM

## 2021-03-13 DIAGNOSIS — Z94.0 KIDNEY TRANSPLANT STATUS: Chronic | ICD-10-CM

## 2021-03-13 DIAGNOSIS — L97.512 NON-PRESSURE CHRONIC ULCER OF OTHER PART OF RIGHT FOOT WITH FAT LAYER EXPOSED: ICD-10-CM

## 2021-03-13 DIAGNOSIS — N18.30 CHRONIC KIDNEY DISEASE, STAGE 3 UNSPECIFIED: ICD-10-CM

## 2021-03-13 DIAGNOSIS — Z86.73 PERSONAL HISTORY OF TRANSIENT ISCHEMIC ATTACK (TIA), AND CEREBRAL INFARCTION WITHOUT RESIDUAL DEFICITS: ICD-10-CM

## 2021-03-13 DIAGNOSIS — Z48.02 ENCOUNTER FOR REMOVAL OF SUTURES: ICD-10-CM

## 2021-03-13 DIAGNOSIS — Z89.422 ACQUIRED ABSENCE OF OTHER LEFT TOE(S): ICD-10-CM

## 2021-03-13 DIAGNOSIS — Z89.421 ACQUIRED ABSENCE OF OTHER RIGHT TOE(S): Chronic | ICD-10-CM

## 2021-03-13 DIAGNOSIS — Z79.899 OTHER LONG TERM (CURRENT) DRUG THERAPY: ICD-10-CM

## 2021-03-13 DIAGNOSIS — Z80.8 FAMILY HISTORY OF MALIGNANT NEOPLASM OF OTHER ORGANS OR SYSTEMS: ICD-10-CM

## 2021-03-13 DIAGNOSIS — Z85.828 PERSONAL HISTORY OF OTHER MALIGNANT NEOPLASM OF SKIN: ICD-10-CM

## 2021-03-13 DIAGNOSIS — Z89.421 ACQUIRED ABSENCE OF OTHER RIGHT TOE(S): ICD-10-CM

## 2021-03-13 DIAGNOSIS — G47.33 OBSTRUCTIVE SLEEP APNEA (ADULT) (PEDIATRIC): ICD-10-CM

## 2021-03-13 DIAGNOSIS — Z87.81 PERSONAL HISTORY OF (HEALED) TRAUMATIC FRACTURE: ICD-10-CM

## 2021-03-13 DIAGNOSIS — Z79.4 LONG TERM (CURRENT) USE OF INSULIN: ICD-10-CM

## 2021-03-13 DIAGNOSIS — Z83.3 FAMILY HISTORY OF DIABETES MELLITUS: ICD-10-CM

## 2021-03-13 DIAGNOSIS — I13.10 HYPERTENSIVE HEART AND CHRONIC KIDNEY DISEASE WITHOUT HEART FAILURE, WITH STAGE 1 THROUGH STAGE 4 CHRONIC KIDNEY DISEASE, OR UNSPECIFIED CHRONIC KIDNEY DISEASE: ICD-10-CM

## 2021-03-13 DIAGNOSIS — Z79.82 LONG TERM (CURRENT) USE OF ASPIRIN: ICD-10-CM

## 2021-03-13 DIAGNOSIS — E78.5 HYPERLIPIDEMIA, UNSPECIFIED: ICD-10-CM

## 2021-03-13 DIAGNOSIS — Z80.3 FAMILY HISTORY OF MALIGNANT NEOPLASM OF BREAST: ICD-10-CM

## 2021-03-13 DIAGNOSIS — Z86.718 PERSONAL HISTORY OF OTHER VENOUS THROMBOSIS AND EMBOLISM: ICD-10-CM

## 2021-03-13 DIAGNOSIS — E11.40 TYPE 2 DIABETES MELLITUS WITH DIABETIC NEUROPATHY, UNSPECIFIED: ICD-10-CM

## 2021-03-13 DIAGNOSIS — E11.22 TYPE 2 DIABETES MELLITUS WITH DIABETIC CHRONIC KIDNEY DISEASE: ICD-10-CM

## 2021-03-13 DIAGNOSIS — Z94.0 KIDNEY TRANSPLANT STATUS: ICD-10-CM

## 2021-03-13 DIAGNOSIS — T86.828 OTHER COMPLICATIONS OF SKIN GRAFT (ALLOGRAFT) (AUTOGRAFT): ICD-10-CM

## 2021-03-13 DIAGNOSIS — N18.6 END STAGE RENAL DISEASE: Chronic | ICD-10-CM

## 2021-03-13 PROCEDURE — 82962 GLUCOSE BLOOD TEST: CPT

## 2021-03-13 PROCEDURE — G0277: CPT

## 2021-03-13 PROCEDURE — 99183 HYPERBARIC OXYGEN THERAPY: CPT

## 2021-03-13 NOTE — PROCEDURE
[Outpatient] : Outpatient [Ambulatory] : Patient is ambulatory. [THIS CHAMBER HAS BEEN CLEANED / DISINFECTED] : This chamber has been cleaned / disinfected according to local and hospital policy and procedure prior to this treatment. [____] : Post-Dive: Time - [unfilled] [___] : Post-Dive: Value - [unfilled] mg/dL [Patient demonstrated and verbalized proper technique for using air break mask] : Patient demonstrated and verbalized proper technique for using air break mask [Patient educated on the risks of SMOKING prior to HBOT with understanding] : Patient educated on the risks of SMOKING prior to HBOT with understanding [Patient educated on the risks of CONSUMING ALCOHOL prior to HBOT with understanding] : Patient educated on the risks of CONSUMING ALCOHOL prior to HBOT with understanding [100% Cotton] : 100% cotton [Empty all pockets] : empty all pockets [No hair oils, wigs, hairpieces, pins] : no hair oils, wigs, hairpieces, pins  [Pre tx medications] : pre tx medications  [No make-up, creams] : no make-up, creams  [No jewelry] : no jewelry  [No matches, cigarettes, lighters] : no matches, cigarettes, lighters  [Hearing aid removed] : hearing aid removed [Dentures removed] : dentures removed [Ground bracelet on pt's wrist] : ground bracelet on pt's wrist  [Contacts removed] : contacts removed  [Remove nail polish] : remove nail polish  [No reading material] : no reading material  [Bra, undergarments removed] : bra, undergarments removed  [No contraindicated dressings] : no contraindicated dressings [Ground Wire - VISUAL Verification - Intact/Free of Obstruction] : Ground Wire - VISUAL Verification - Intact/Free of Obstruction  [Ground Continuity - Verified < 1 ohm w/ Wrist Strap Gatito] : Ground Continuity - Verified < 1 ohm w/ Wrist Strap Gatito [Diagnosis: ___] : Diagnosis: [unfilled] [Number: ___] : Number: [unfilled] [Clear all fields] : clear all fields [] : No [FreeTextEntry4] : 100 [FreeTextEntry6] : 1339 [FreeTextEntry8] : 3641 [de-identified] : 6988 [de-identified] : 7808 [de-identified] : 0690 [de-identified] : 9035 [de-identified] : 5990 [de-identified] : 0939 [de-identified] : 120 minutes

## 2021-03-13 NOTE — ADDENDUM
[FreeTextEntry1] : PT RECEIVED OFF-LOADING MEASURES PRE-HBOT\par PTS INSULIN PORT COVERED BY ABD/PAPER TAPE PRE HBOT \par PT DESCENDED TO 2.4 DEEPAK @ 2.2 PSI/MIN WITHOUT INCIDENT IN CHAMBER #2\par PT RESTING AT TX DEPTH WITH VISIBLE CHEST RISE AND FALL OBSERVED CHAMBERSIDE \par PT ASCENDED FROM TX DEPTH WITHOUT INCIDENT \par PT Received ACE WRAP BY RN POST TX. \par PT TOLERATED TX WELL

## 2021-03-13 NOTE — ASSESSMENT
[Patient undergoing HBO treatment for __________] : Patient undergoing HBO treatment for [unfilled] [Patient descended without problem for 9 minutes] : Patient descended without problem for 9 minutes [No dizziness or thirst] :  No dizziness or thirst [No ear problems] : No ear problems [Vital signs stable] : Vital signs stable [Tolerating dive well] : Tolerating dive well [No Chest Pain, shortness of breath] : No Chest Pain, shortness of breath [Respiratory Rate Stable] : Respiratory Rate Stable [No chest pain, shortness of breath, or ear pain] :  No chest pain, shortness of breath, or ear pain  [Tolerated Ascent well] : Tolerated Ascent well [Vital Signs stable] : Vital Signs stable [A physician was present throughout the entire HBOT] : A physician was present throughout the entire HBOT [No] : No [Clinically Stable] : Clinically stable [Continue Treatment Plan] : Continue treatment plan [0] : 0 out of 10 [No change from previous assessment] : No change from previous assessment [Patient prepared for dive] : Patient prepared for dive [FreeTextEntry2] : none

## 2021-03-15 ENCOUNTER — APPOINTMENT (OUTPATIENT)
Dept: HYPERBARIC MEDICINE | Facility: HOSPITAL | Age: 56
End: 2021-03-15
Payer: MEDICARE

## 2021-03-15 ENCOUNTER — OUTPATIENT (OUTPATIENT)
Dept: OUTPATIENT SERVICES | Facility: HOSPITAL | Age: 56
LOS: 1 days | Discharge: HOME | End: 2021-03-15
Payer: MEDICARE

## 2021-03-15 ENCOUNTER — NON-APPOINTMENT (OUTPATIENT)
Age: 56
End: 2021-03-15

## 2021-03-15 VITALS
RESPIRATION RATE: 20 BRPM | SYSTOLIC BLOOD PRESSURE: 203 MMHG | DIASTOLIC BLOOD PRESSURE: 93 MMHG | OXYGEN SATURATION: 96 % | TEMPERATURE: 98.4 F | HEART RATE: 60 BPM

## 2021-03-15 VITALS — SYSTOLIC BLOOD PRESSURE: 196 MMHG | DIASTOLIC BLOOD PRESSURE: 86 MMHG

## 2021-03-15 VITALS
HEART RATE: 65 BPM | OXYGEN SATURATION: 99 % | DIASTOLIC BLOOD PRESSURE: 84 MMHG | SYSTOLIC BLOOD PRESSURE: 191 MMHG | TEMPERATURE: 97.7 F | RESPIRATION RATE: 20 BRPM

## 2021-03-15 VITALS — DIASTOLIC BLOOD PRESSURE: 92 MMHG | SYSTOLIC BLOOD PRESSURE: 154 MMHG

## 2021-03-15 DIAGNOSIS — T86.828 OTHER COMPLICATIONS OF SKIN GRAFT (ALLOGRAFT) (AUTOGRAFT): ICD-10-CM
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
## 2021-03-15 DIAGNOSIS — L97.512 NON-PRESSURE CHRONIC ULCER OF OTHER PART OF RIGHT FOOT WITH FAT LAYER EXPOSED: ICD-10-CM

## 2021-03-15 DIAGNOSIS — Z79.4 LONG TERM (CURRENT) USE OF INSULIN: ICD-10-CM

## 2021-03-15 DIAGNOSIS — Z94.0 KIDNEY TRANSPLANT STATUS: Chronic | ICD-10-CM

## 2021-03-15 DIAGNOSIS — Z89.421 ACQUIRED ABSENCE OF OTHER RIGHT TOE(S): Chronic | ICD-10-CM

## 2021-03-15 DIAGNOSIS — E11.621 TYPE 2 DIABETES MELLITUS WITH FOOT ULCER: ICD-10-CM

## 2021-03-15 DIAGNOSIS — N18.6 END STAGE RENAL DISEASE: Chronic | ICD-10-CM

## 2021-03-15 PROCEDURE — G0277: CPT

## 2021-03-15 PROCEDURE — 99183 HYPERBARIC OXYGEN THERAPY: CPT

## 2021-03-15 PROCEDURE — 82962 GLUCOSE BLOOD TEST: CPT

## 2021-03-16 ENCOUNTER — APPOINTMENT (OUTPATIENT)
Dept: HYPERBARIC MEDICINE | Facility: HOSPITAL | Age: 56
End: 2021-03-16
Payer: MEDICARE

## 2021-03-16 ENCOUNTER — OUTPATIENT (OUTPATIENT)
Dept: OUTPATIENT SERVICES | Facility: HOSPITAL | Age: 56
LOS: 1 days | Discharge: HOME | End: 2021-03-16
Payer: MEDICARE

## 2021-03-16 VITALS
SYSTOLIC BLOOD PRESSURE: 193 MMHG | OXYGEN SATURATION: 96 % | HEART RATE: 64 BPM | DIASTOLIC BLOOD PRESSURE: 85 MMHG | TEMPERATURE: 97 F | RESPIRATION RATE: 18 BRPM

## 2021-03-16 VITALS — SYSTOLIC BLOOD PRESSURE: 165 MMHG | DIASTOLIC BLOOD PRESSURE: 79 MMHG

## 2021-03-16 VITALS
DIASTOLIC BLOOD PRESSURE: 78 MMHG | HEART RATE: 60 BPM | TEMPERATURE: 97.1 F | OXYGEN SATURATION: 96 % | RESPIRATION RATE: 20 BRPM | SYSTOLIC BLOOD PRESSURE: 168 MMHG

## 2021-03-16 DIAGNOSIS — E11.621 TYPE 2 DIABETES MELLITUS WITH FOOT ULCER: ICD-10-CM

## 2021-03-16 DIAGNOSIS — T86.828 OTHER COMPLICATIONS OF SKIN GRAFT (ALLOGRAFT) (AUTOGRAFT): ICD-10-CM

## 2021-03-16 DIAGNOSIS — N18.6 END STAGE RENAL DISEASE: Chronic | ICD-10-CM

## 2021-03-16 DIAGNOSIS — Z94.0 KIDNEY TRANSPLANT STATUS: Chronic | ICD-10-CM

## 2021-03-16 DIAGNOSIS — Z79.4 LONG TERM (CURRENT) USE OF INSULIN: ICD-10-CM
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
## 2021-03-16 DIAGNOSIS — L97.512 NON-PRESSURE CHRONIC ULCER OF OTHER PART OF RIGHT FOOT WITH FAT LAYER EXPOSED: ICD-10-CM

## 2021-03-16 DIAGNOSIS — Z89.421 ACQUIRED ABSENCE OF OTHER RIGHT TOE(S): Chronic | ICD-10-CM

## 2021-03-16 PROCEDURE — 81001 URINALYSIS AUTO W/SCOPE: CPT

## 2021-03-16 PROCEDURE — 73718 MRI LOWER EXTREMITY W/O DYE: CPT

## 2021-03-16 PROCEDURE — 83605 ASSAY OF LACTIC ACID: CPT

## 2021-03-16 PROCEDURE — 87077 CULTURE AEROBIC IDENTIFY: CPT

## 2021-03-16 PROCEDURE — 88311 DECALCIFY TISSUE: CPT

## 2021-03-16 PROCEDURE — 85610 PROTHROMBIN TIME: CPT

## 2021-03-16 PROCEDURE — 93971 EXTREMITY STUDY: CPT

## 2021-03-16 PROCEDURE — 80053 COMPREHEN METABOLIC PANEL: CPT

## 2021-03-16 PROCEDURE — 88304 TISSUE EXAM BY PATHOLOGIST: CPT

## 2021-03-16 PROCEDURE — 93005 ELECTROCARDIOGRAM TRACING: CPT

## 2021-03-16 PROCEDURE — 87641 MR-STAPH DNA AMP PROBE: CPT

## 2021-03-16 PROCEDURE — U0005: CPT

## 2021-03-16 PROCEDURE — 86900 BLOOD TYPING SEROLOGIC ABO: CPT

## 2021-03-16 PROCEDURE — U0003: CPT

## 2021-03-16 PROCEDURE — 82010 KETONE BODYS QUAN: CPT

## 2021-03-16 PROCEDURE — 87186 SC STD MICRODIL/AGAR DIL: CPT

## 2021-03-16 PROCEDURE — 99285 EMERGENCY DEPT VISIT HI MDM: CPT

## 2021-03-16 PROCEDURE — 96374 THER/PROPH/DIAG INJ IV PUSH: CPT

## 2021-03-16 PROCEDURE — 97162 PT EVAL MOD COMPLEX 30 MIN: CPT

## 2021-03-16 PROCEDURE — 85730 THROMBOPLASTIN TIME PARTIAL: CPT

## 2021-03-16 PROCEDURE — 36415 COLL VENOUS BLD VENIPUNCTURE: CPT

## 2021-03-16 PROCEDURE — 82962 GLUCOSE BLOOD TEST: CPT

## 2021-03-16 PROCEDURE — G0277: CPT

## 2021-03-16 PROCEDURE — 87075 CULTR BACTERIA EXCEPT BLOOD: CPT

## 2021-03-16 PROCEDURE — 83036 HEMOGLOBIN GLYCOSYLATED A1C: CPT

## 2021-03-16 PROCEDURE — 87070 CULTURE OTHR SPECIMN AEROBIC: CPT

## 2021-03-16 PROCEDURE — 86769 SARS-COV-2 COVID-19 ANTIBODY: CPT

## 2021-03-16 PROCEDURE — 85025 COMPLETE CBC W/AUTO DIFF WBC: CPT

## 2021-03-16 PROCEDURE — 71045 X-RAY EXAM CHEST 1 VIEW: CPT

## 2021-03-16 PROCEDURE — 87040 BLOOD CULTURE FOR BACTERIA: CPT

## 2021-03-16 PROCEDURE — 86850 RBC ANTIBODY SCREEN: CPT

## 2021-03-16 PROCEDURE — 85027 COMPLETE CBC AUTOMATED: CPT

## 2021-03-16 PROCEDURE — 99183 HYPERBARIC OXYGEN THERAPY: CPT

## 2021-03-16 PROCEDURE — 73630 X-RAY EXAM OF FOOT: CPT

## 2021-03-16 PROCEDURE — 80048 BASIC METABOLIC PNL TOTAL CA: CPT

## 2021-03-16 PROCEDURE — 86901 BLOOD TYPING SEROLOGIC RH(D): CPT

## 2021-03-16 PROCEDURE — 87640 STAPH A DNA AMP PROBE: CPT

## 2021-03-16 NOTE — ADDENDUM
[FreeTextEntry1] : PT RECEIVED OFF LOADING MEASURES PRE HBOT\par PTS B/P WAS NOT WITHIN LIMITS FOR HBOT. PTS BP TAKEN MANUALLY AND WAS WITHIN ACCEPTABLE LIMITS FOR HBOT \par PT DESCENDED TO 2.4 DEEPAK @ 2.2 PSI/MIN WITHOUT INCIDENT IN CHAMBER #4\par PT RESTING AT TX DEPTH WITH VISIBLE CHEST RISE AND FALL OBSERVED CHAMBERSIDE \par PT ASCENDED FROM TX DEPTH WITHOUT INCIDENT \par PT TOLERATED TX WELL

## 2021-03-16 NOTE — PROCEDURE
[Outpatient] : Outpatient [Ambulatory] : Patient is ambulatory. [THIS CHAMBER HAS BEEN CLEANED / DISINFECTED] : This chamber has been cleaned / disinfected according to local and hospital policy and procedure prior to this treatment. [Patient demonstrated and verbalized proper technique for using air break mask] : Patient demonstrated and verbalized proper technique for using air break mask [Patient educated on the risks of SMOKING prior to HBOT with understanding] : Patient educated on the risks of SMOKING prior to HBOT with understanding [Patient educated on the risks of CONSUMING ALCOHOL prior to HBOT with understanding] : Patient educated on the risks of CONSUMING ALCOHOL prior to HBOT with understanding [100% Cotton] : 100% cotton [Empty all pockets] : empty all pockets [No hair oils, wigs, hairpieces, pins] : no hair oils, wigs, hairpieces, pins  [Pre tx medications] : pre tx medications  [No make-up, creams] : no make-up, creams  [No jewelry] : no jewelry  [No matches, cigarettes, lighters] : no matches, cigarettes, lighters  [Hearing aid removed] : hearing aid removed [Dentures removed] : dentures removed [Ground bracelet on pt's wrist] : ground bracelet on pt's wrist  [Contacts removed] : contacts removed  [Remove nail polish] : remove nail polish  [No reading material] : no reading material  [Bra, undergarments removed] : bra, undergarments removed  [No contraindicated dressings] : no contraindicated dressings [Ground Wire - VISUAL Verification - Intact/Free of Obstruction] : Ground Wire - VISUAL Verification - Intact/Free of Obstruction  [Ground Continuity - Verified < 1 ohm w/ Wrist Strap Gatito] : Ground Continuity - Verified < 1 ohm w/ Wrist Strap Gatito [Number: ___] : Number: [unfilled] [Diagnosis: ___] : Diagnosis: [unfilled] [____] : Post-Dive: Time - [unfilled] [___] : Post-Dive: Value - [unfilled] mg/dL [Clear all fields] : clear all fields [] : No [FreeTextEntry5] : 1 [FreeTextEntry4] : 100 [FreeTextEntry6] : 8280 [FreeTextEntry8] : 0014 [de-identified] : 6931 [de-identified] : 8620 [de-identified] : 1729 [de-identified] : 3683 [de-identified] : 2937 [de-identified] : 1758 [de-identified] : 120 minutes

## 2021-03-17 ENCOUNTER — OUTPATIENT (OUTPATIENT)
Dept: OUTPATIENT SERVICES | Facility: HOSPITAL | Age: 56
LOS: 1 days | Discharge: HOME | End: 2021-03-17
Payer: MEDICARE

## 2021-03-17 ENCOUNTER — APPOINTMENT (OUTPATIENT)
Dept: HYPERBARIC MEDICINE | Facility: HOSPITAL | Age: 56
End: 2021-03-17
Payer: MEDICARE

## 2021-03-17 VITALS
OXYGEN SATURATION: 91 % | RESPIRATION RATE: 18 BRPM | SYSTOLIC BLOOD PRESSURE: 178 MMHG | HEART RATE: 76 BPM | DIASTOLIC BLOOD PRESSURE: 84 MMHG | TEMPERATURE: 98.1 F

## 2021-03-17 VITALS
DIASTOLIC BLOOD PRESSURE: 74 MMHG | HEART RATE: 64 BPM | OXYGEN SATURATION: 100 % | RESPIRATION RATE: 20 BRPM | SYSTOLIC BLOOD PRESSURE: 176 MMHG | TEMPERATURE: 97.7 F

## 2021-03-17 DIAGNOSIS — E11.621 TYPE 2 DIABETES MELLITUS WITH FOOT ULCER: ICD-10-CM

## 2021-03-17 DIAGNOSIS — L97.512 NON-PRESSURE CHRONIC ULCER OF OTHER PART OF RIGHT FOOT WITH FAT LAYER EXPOSED: ICD-10-CM

## 2021-03-17 DIAGNOSIS — T86.828 OTHER COMPLICATIONS OF SKIN GRAFT (ALLOGRAFT) (AUTOGRAFT): ICD-10-CM

## 2021-03-17 DIAGNOSIS — Z79.4 LONG TERM (CURRENT) USE OF INSULIN: ICD-10-CM
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
## 2021-03-17 DIAGNOSIS — Z94.0 KIDNEY TRANSPLANT STATUS: Chronic | ICD-10-CM

## 2021-03-17 DIAGNOSIS — N18.6 END STAGE RENAL DISEASE: Chronic | ICD-10-CM

## 2021-03-17 DIAGNOSIS — Z89.421 ACQUIRED ABSENCE OF OTHER RIGHT TOE(S): Chronic | ICD-10-CM

## 2021-03-17 PROCEDURE — 82962 GLUCOSE BLOOD TEST: CPT

## 2021-03-17 PROCEDURE — 99183 HYPERBARIC OXYGEN THERAPY: CPT

## 2021-03-17 PROCEDURE — G0277: CPT

## 2021-03-18 ENCOUNTER — OUTPATIENT (OUTPATIENT)
Dept: OUTPATIENT SERVICES | Facility: HOSPITAL | Age: 56
LOS: 1 days | Discharge: HOME | End: 2021-03-18
Payer: MEDICARE

## 2021-03-18 ENCOUNTER — APPOINTMENT (OUTPATIENT)
Dept: HYPERBARIC MEDICINE | Facility: HOSPITAL | Age: 56
End: 2021-03-18
Payer: MEDICARE

## 2021-03-18 ENCOUNTER — NON-APPOINTMENT (OUTPATIENT)
Age: 56
End: 2021-03-18

## 2021-03-18 VITALS
HEART RATE: 66 BPM | SYSTOLIC BLOOD PRESSURE: 164 MMHG | RESPIRATION RATE: 20 BRPM | TEMPERATURE: 97.8 F | OXYGEN SATURATION: 99 % | DIASTOLIC BLOOD PRESSURE: 78 MMHG

## 2021-03-18 VITALS
SYSTOLIC BLOOD PRESSURE: 164 MMHG | HEART RATE: 72 BPM | TEMPERATURE: 97.1 F | DIASTOLIC BLOOD PRESSURE: 80 MMHG | RESPIRATION RATE: 20 BRPM | OXYGEN SATURATION: 94 %

## 2021-03-18 DIAGNOSIS — Z94.0 KIDNEY TRANSPLANT STATUS: Chronic | ICD-10-CM

## 2021-03-18 DIAGNOSIS — T86.828 OTHER COMPLICATIONS OF SKIN GRAFT (ALLOGRAFT) (AUTOGRAFT): ICD-10-CM

## 2021-03-18 DIAGNOSIS — Z89.421 ACQUIRED ABSENCE OF OTHER RIGHT TOE(S): Chronic | ICD-10-CM

## 2021-03-18 DIAGNOSIS — N18.6 END STAGE RENAL DISEASE: Chronic | ICD-10-CM

## 2021-03-18 PROCEDURE — U0005: CPT

## 2021-03-18 PROCEDURE — G0277: CPT

## 2021-03-18 PROCEDURE — 99183 HYPERBARIC OXYGEN THERAPY: CPT

## 2021-03-18 PROCEDURE — 82962 GLUCOSE BLOOD TEST: CPT

## 2021-03-18 PROCEDURE — U0003: CPT

## 2021-03-18 NOTE — ASSESSMENT
[No change from previous assessment] : No change from previous assessment [Patient prepared for dive] : Patient prepared for dive [Patient descended without problem for 9 minutes] : Patient descended without problem for 9 minutes [No dizziness or thirst] :  No dizziness or thirst [No ear problems] : No ear problems [Vital signs stable] : Vital signs stable [Tolerating dive well] : Tolerating dive well [No Chest Pain, shortness of breath] : No Chest Pain, shortness of breath [Respiratory Rate Stable] : Respiratory Rate Stable [No chest pain, shortness of breath, or ear pain] :  No chest pain, shortness of breath, or ear pain  [Tolerated Ascent well] : Tolerated Ascent well [Vital Signs stable] : Vital Signs stable [A physician was present throughout the entire HBOT] : A physician was present throughout the entire HBOT [No] : No [Clinically Stable] : Clinically stable [Continue Treatment Plan] : Continue treatment plan [Patient undergoing HBO treatment for __________] : Patient undergoing HBO treatment for [unfilled] [FreeTextEntry2] : none

## 2021-03-18 NOTE — ADDENDUM
[FreeTextEntry1] : PT RECEIVED OFF-LOADING MEASURES PRE HBOT \par PTS INSULIN PORT COVERED WITH ABD PADS AND PAPER TAPE \par PT DESCENDED TO 2.4 DEEPAK @ 2.2 PSI/MIN WITHOUT INCIDENT IN CHAMBER #3\par PT RESTING AT TX DEPTH WITH VISIBLE CHEST RISE AND FALL OBSERVED CHAMBERSDIE \par PT TOLERATED BOTH AIR BREAKS WELL \par PT ASCENDED FROM TX DEPTH WITHOUT INCIDENT \par PT RECEIVED WOUND CARE BY LPN POST HBOT \par PT TOLERATED TX WELL

## 2021-03-18 NOTE — ADDENDUM
[FreeTextEntry1] : The pt presented to [facility] ambulatory and A&Ox3 for scheduled HBOT.\par The pt's pre-dive screening found elevated BP.\par The pt's BP was re-tested manually and found to be within acceptable limits to begin HBOT.\par The pt was provided lower extremity elevation measure to comfort prior to HBOT.\par The pt received wound care bandage change x Nurse prior to HBOT.\par The pt descended @ 2.2 PSI/min to Rx'd tx depth of 2.4 DEEPAK in chamber # 1 without incident.\par The pt was observed with visible chest motion and without incident for the duration of HBOT.\par The pt was administered and received intermittent med. air over course of HBOT without incident.\par PT ASCENDED FROM TX DEPTH WITHOUT INCIDENT \par PT POST DIVE SCREENING FOUND ELEVATED BP\par PT WAS RETESTED MANUALLY AND BP WAS WITHIN LIMITS \par PT TOLERATED TX WELL

## 2021-03-18 NOTE — PROCEDURE
[Outpatient] : Outpatient [Ambulatory] : Patient is ambulatory. [THIS CHAMBER HAS BEEN CLEANED / DISINFECTED] : This chamber has been cleaned / disinfected according to local and hospital policy and procedure prior to this treatment. [Patient demonstrated and verbalized proper technique for using air break mask] : Patient demonstrated and verbalized proper technique for using air break mask [Patient educated on the risks of SMOKING prior to HBOT with understanding] : Patient educated on the risks of SMOKING prior to HBOT with understanding [Patient educated on the risks of CONSUMING ALCOHOL prior to HBOT with understanding] : Patient educated on the risks of CONSUMING ALCOHOL prior to HBOT with understanding [100% Cotton] : 100% cotton [Empty all pockets] : empty all pockets [No hair oils, wigs, hairpieces, pins] : no hair oils, wigs, hairpieces, pins  [Pre tx medications] : pre tx medications  [No make-up, creams] : no make-up, creams  [No jewelry] : no jewelry  [No matches, cigarettes, lighters] : no matches, cigarettes, lighters  [Hearing aid removed] : hearing aid removed [Dentures removed] : dentures removed [Ground bracelet on pt's wrist] : ground bracelet on pt's wrist  [Contacts removed] : contacts removed  [Remove nail polish] : remove nail polish  [No reading material] : no reading material  [Bra, undergarments removed] : bra, undergarments removed  [No contraindicated dressings] : no contraindicated dressings [Ground Wire - VISUAL Verification - Intact/Free of Obstruction] : Ground Wire - VISUAL Verification - Intact/Free of Obstruction  [Ground Continuity - Verified < 1 ohm w/ Wrist Strap Gatito] : Ground Continuity - Verified < 1 ohm w/ Wrist Strap [place] [Number: ___] : Number: [unfilled] [Diagnosis: ___] : Diagnosis: [unfilled] [____] : Post-Dive: Time - [unfilled] [___] : Post-Dive: Value - [unfilled] mg/dL [Clear all fields] : clear all fields [] : No [FreeTextEntry4] : 100 [FreeTextEntry6] : 16 : 29 [FreeTextEntry8] : 16 : 39 [de-identified] : 17 : 09  [de-identified] : 17 : 14  [de-identified] : 17 : 44  [de-identified] : 17 : 49  [de-identified] : 18 : 19  [de-identified] : 18 : 29 [de-identified] : 120 minutes

## 2021-03-18 NOTE — PROCEDURE
[Outpatient] : Outpatient [Ambulatory] : Patient is ambulatory. [THIS CHAMBER HAS BEEN CLEANED / DISINFECTED] : This chamber has been cleaned / disinfected according to local and hospital policy and procedure prior to this treatment. [Patient demonstrated and verbalized proper technique for using air break mask] : Patient demonstrated and verbalized proper technique for using air break mask [Patient educated on the risks of SMOKING prior to HBOT with understanding] : Patient educated on the risks of SMOKING prior to HBOT with understanding [Patient educated on the risks of CONSUMING ALCOHOL prior to HBOT with understanding] : Patient educated on the risks of CONSUMING ALCOHOL prior to HBOT with understanding [100% Cotton] : 100% cotton [Empty all pockets] : empty all pockets [No hair oils, wigs, hairpieces, pins] : no hair oils, wigs, hairpieces, pins  [Pre tx medications] : pre tx medications  [No make-up, creams] : no make-up, creams  [No jewelry] : no jewelry  [No matches, cigarettes, lighters] : no matches, cigarettes, lighters  [Hearing aid removed] : hearing aid removed [Dentures removed] : dentures removed [Ground bracelet on pt's wrist] : ground bracelet on pt's wrist  [Contacts removed] : contacts removed  [Remove nail polish] : remove nail polish  [No reading material] : no reading material  [Bra, undergarments removed] : bra, undergarments removed  [No contraindicated dressings] : no contraindicated dressings [Ground Wire - VISUAL Verification - Intact/Free of Obstruction] : Ground Wire - VISUAL Verification - Intact/Free of Obstruction  [Ground Continuity - Verified < 1 ohm w/ Wrist Strap [place]] : Ground Continuity - Verified < 1 ohm w/ Wrist Strap [place] [Number: ___] : Number: [unfilled] [Diagnosis: ___] : Diagnosis: [unfilled] [____] : Post-Dive: Time - [unfilled] [___] : Post-Dive: Value - [unfilled] mg/dL [Clear all fields] : clear all fields [] : No [FreeTextEntry4] : 100 [FreeTextEntry6] : 0411 [FreeTextEntry8] : 2864 [de-identified] : 0075 [de-identified] : 5368 [de-identified] : 8251 [de-identified] : 5695 [de-identified] : 1800 [de-identified] : 2615 [de-identified] : 120 minutes

## 2021-03-19 ENCOUNTER — OUTPATIENT (OUTPATIENT)
Dept: OUTPATIENT SERVICES | Facility: HOSPITAL | Age: 56
LOS: 1 days | Discharge: HOME | End: 2021-03-19
Payer: MEDICARE

## 2021-03-19 ENCOUNTER — APPOINTMENT (OUTPATIENT)
Dept: HYPERBARIC MEDICINE | Facility: HOSPITAL | Age: 56
End: 2021-03-19
Payer: MEDICARE

## 2021-03-19 VITALS
HEART RATE: 66 BPM | OXYGEN SATURATION: 96 % | SYSTOLIC BLOOD PRESSURE: 191 MMHG | TEMPERATURE: 97.3 F | RESPIRATION RATE: 18 BRPM | DIASTOLIC BLOOD PRESSURE: 80 MMHG

## 2021-03-19 VITALS
TEMPERATURE: 97.7 F | DIASTOLIC BLOOD PRESSURE: 79 MMHG | HEART RATE: 62 BPM | RESPIRATION RATE: 20 BRPM | SYSTOLIC BLOOD PRESSURE: 198 MMHG | OXYGEN SATURATION: 95 %

## 2021-03-19 VITALS — SYSTOLIC BLOOD PRESSURE: 145 MMHG | DIASTOLIC BLOOD PRESSURE: 60 MMHG

## 2021-03-19 DIAGNOSIS — Z94.0 KIDNEY TRANSPLANT STATUS: Chronic | ICD-10-CM

## 2021-03-19 DIAGNOSIS — T86.828 OTHER COMPLICATIONS OF SKIN GRAFT (ALLOGRAFT) (AUTOGRAFT): ICD-10-CM
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
## 2021-03-19 DIAGNOSIS — Z79.4 LONG TERM (CURRENT) USE OF INSULIN: ICD-10-CM

## 2021-03-19 DIAGNOSIS — N18.6 END STAGE RENAL DISEASE: Chronic | ICD-10-CM

## 2021-03-19 DIAGNOSIS — Z89.421 ACQUIRED ABSENCE OF OTHER RIGHT TOE(S): Chronic | ICD-10-CM

## 2021-03-19 DIAGNOSIS — E11.621 TYPE 2 DIABETES MELLITUS WITH FOOT ULCER: ICD-10-CM

## 2021-03-19 DIAGNOSIS — L97.512 NON-PRESSURE CHRONIC ULCER OF OTHER PART OF RIGHT FOOT WITH FAT LAYER EXPOSED: ICD-10-CM

## 2021-03-19 LAB — SARS-COV-2 RNA SPEC QL NAA+PROBE: SIGNIFICANT CHANGE UP

## 2021-03-19 PROCEDURE — 82962 GLUCOSE BLOOD TEST: CPT

## 2021-03-19 PROCEDURE — 99183 HYPERBARIC OXYGEN THERAPY: CPT

## 2021-03-19 PROCEDURE — G0277: CPT

## 2021-03-19 NOTE — ADDENDUM
[FreeTextEntry1] : Pt descended to 2.4 DEEPAK @ 2.2 PSI/MIN without incident in chamber #1. \par Pt's resting with head and legs elevated at tx depth with visible chest rise and fall observed chamberside.\par Pt tolerated airbreaks without incident\par Pt ascended from tx depth to surface without incident.\par Pt tolerated HBOT without incident\par Pt received covid-19 swab post-HBOT via LPN.

## 2021-03-19 NOTE — PROCEDURE
[Outpatient] : Outpatient [Ambulatory] : Patient is ambulatory. [THIS CHAMBER HAS BEEN CLEANED / DISINFECTED] : This chamber has been cleaned / disinfected according to local and hospital policy and procedure prior to this treatment. [Patient demonstrated and verbalized proper technique for using air break mask] : Patient demonstrated and verbalized proper technique for using air break mask [Patient educated on the risks of SMOKING prior to HBOT with understanding] : Patient educated on the risks of SMOKING prior to HBOT with understanding [Patient educated on the risks of CONSUMING ALCOHOL prior to HBOT with understanding] : Patient educated on the risks of CONSUMING ALCOHOL prior to HBOT with understanding [100% Cotton] : 100% cotton [Empty all pockets] : empty all pockets [No hair oils, wigs, hairpieces, pins] : no hair oils, wigs, hairpieces, pins  [Pre tx medications] : pre tx medications  [No make-up, creams] : no make-up, creams  [No jewelry] : no jewelry  [No matches, cigarettes, lighters] : no matches, cigarettes, lighters  [Hearing aid removed] : hearing aid removed [Dentures removed] : dentures removed [Ground bracelet on pt's wrist] : ground bracelet on pt's wrist  [Contacts removed] : contacts removed  [Remove nail polish] : remove nail polish  [No reading material] : no reading material  [Bra, undergarments removed] : bra, undergarments removed  [No contraindicated dressings] : no contraindicated dressings [Ground Wire - VISUAL Verification - Intact/Free of Obstruction] : Ground Wire - VISUAL Verification - Intact/Free of Obstruction  [Ground Continuity - Verified < 1 ohm w/ Wrist Strap [place]] : Ground Continuity - Verified < 1 ohm w/ Wrist Strap [place] [Number: ___] : Number: [unfilled] [Diagnosis: ___] : Diagnosis: [unfilled] [____] : Post-Dive: Time - [unfilled] [___] : Post-Dive: Value - [unfilled] mg/dL [Clear all fields] : clear all fields [] : No [FreeTextEntry4] : 100 [FreeTextEntry6] : 1600 [FreeTextEntry8] : 4186 [de-identified] : 1640 [de-identified] : 7786 [de-identified] : 7467 [de-identified] : 3727 [de-identified] : 7704 [de-identified] : 1800 [de-identified] : 120 minutes

## 2021-03-19 NOTE — PROCEDURE
[Outpatient] : Outpatient [Ambulatory] : Patient is ambulatory. [THIS CHAMBER HAS BEEN CLEANED / DISINFECTED] : This chamber has been cleaned / disinfected according to local and hospital policy and procedure prior to this treatment. [Patient demonstrated and verbalized proper technique for using air break mask] : Patient demonstrated and verbalized proper technique for using air break mask [Patient educated on the risks of SMOKING prior to HBOT with understanding] : Patient educated on the risks of SMOKING prior to HBOT with understanding [Patient educated on the risks of CONSUMING ALCOHOL prior to HBOT with understanding] : Patient educated on the risks of CONSUMING ALCOHOL prior to HBOT with understanding [100% Cotton] : 100% cotton [Empty all pockets] : empty all pockets [No hair oils, wigs, hairpieces, pins] : no hair oils, wigs, hairpieces, pins  [Pre tx medications] : pre tx medications  [No make-up, creams] : no make-up, creams  [No jewelry] : no jewelry  [No matches, cigarettes, lighters] : no matches, cigarettes, lighters  [Hearing aid removed] : hearing aid removed [Dentures removed] : dentures removed [Ground bracelet on pt's wrist] : ground bracelet on pt's wrist  [Contacts removed] : contacts removed  [Remove nail polish] : remove nail polish  [No reading material] : no reading material  [Bra, undergarments removed] : bra, undergarments removed  [No contraindicated dressings] : no contraindicated dressings [Ground Wire - VISUAL Verification - Intact/Free of Obstruction] : Ground Wire - VISUAL Verification - Intact/Free of Obstruction  [Ground Continuity - Verified < 1 ohm w/ Wrist Strap [place]] : Ground Continuity - Verified < 1 ohm w/ Wrist Strap [place] [Clear all fields] : clear all fields [Number: ___] : Number: [unfilled] [Diagnosis: ___] : Diagnosis: [unfilled] [____] : Post-Dive: Time - [unfilled] [___] : Post-Dive: Value - [unfilled] mg/dL [] : No [FreeTextEntry4] : 100  [FreeTextEntry6] : 5735 [FreeTextEntry8] : 8202 [de-identified] : 6875 [de-identified] : 1300 [de-identified] : 1056 [de-identified] : 8576 [de-identified] : 5698 [de-identified] : 8919 [de-identified] : 120 mins

## 2021-03-19 NOTE — ADDENDUM
[FreeTextEntry1] : PT RECEIVED OFF LOADING MEASURES PRE HBOT \par PT RECEIVED WOUND CARE BY RN PRE HBOT \par PT DESCENDED TO 2.4 DEEPAK @ 2.2 PSI/MIN WITHOUT INCIDENT IN CHAMBER #2\par PT RESTING AT TX DEPTH WITH VISIBLE CHEST RISE AND FALL OBSERVED CHAMBERSIDE \par PT TOLERATED BOTH AIR BREAKS WELL\par PT ASCENDED FROM TX DEPTH WITHOUT INCIDENT \par PT TOLERATED TX WELL

## 2021-03-20 ENCOUNTER — OUTPATIENT (OUTPATIENT)
Dept: OUTPATIENT SERVICES | Facility: HOSPITAL | Age: 56
LOS: 1 days | Discharge: HOME | End: 2021-03-20
Payer: MEDICARE

## 2021-03-20 ENCOUNTER — APPOINTMENT (OUTPATIENT)
Dept: HYPERBARIC MEDICINE | Facility: HOSPITAL | Age: 56
End: 2021-03-20
Payer: MEDICARE

## 2021-03-20 VITALS
RESPIRATION RATE: 18 BRPM | OXYGEN SATURATION: 100 % | TEMPERATURE: 97.3 F | SYSTOLIC BLOOD PRESSURE: 149 MMHG | HEART RATE: 66 BPM | DIASTOLIC BLOOD PRESSURE: 79 MMHG

## 2021-03-20 VITALS
RESPIRATION RATE: 18 BRPM | DIASTOLIC BLOOD PRESSURE: 77 MMHG | SYSTOLIC BLOOD PRESSURE: 165 MMHG | HEART RATE: 65 BPM | OXYGEN SATURATION: 98 % | TEMPERATURE: 97.7 F

## 2021-03-20 DIAGNOSIS — E11.621 TYPE 2 DIABETES MELLITUS WITH FOOT ULCER: ICD-10-CM

## 2021-03-20 DIAGNOSIS — Z94.0 KIDNEY TRANSPLANT STATUS: Chronic | ICD-10-CM

## 2021-03-20 DIAGNOSIS — Z89.421 ACQUIRED ABSENCE OF OTHER RIGHT TOE(S): Chronic | ICD-10-CM
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
## 2021-03-20 DIAGNOSIS — T86.828 OTHER COMPLICATIONS OF SKIN GRAFT (ALLOGRAFT) (AUTOGRAFT): ICD-10-CM

## 2021-03-20 DIAGNOSIS — Z79.4 LONG TERM (CURRENT) USE OF INSULIN: ICD-10-CM

## 2021-03-20 DIAGNOSIS — L97.512 NON-PRESSURE CHRONIC ULCER OF OTHER PART OF RIGHT FOOT WITH FAT LAYER EXPOSED: ICD-10-CM

## 2021-03-20 DIAGNOSIS — N18.6 END STAGE RENAL DISEASE: Chronic | ICD-10-CM

## 2021-03-20 PROCEDURE — 82962 GLUCOSE BLOOD TEST: CPT

## 2021-03-20 PROCEDURE — 99183 HYPERBARIC OXYGEN THERAPY: CPT

## 2021-03-20 PROCEDURE — G0277: CPT

## 2021-03-20 NOTE — ADDENDUM
[FreeTextEntry1] : Pt descended to 2.4 DEEPAK @ 2.2 PSI/min without incident in chamber #1\par Pt resting @ depth with chest rise and fall observed throughout tx. \par Pt tolerated air breaks well. \par Pt ascended from 2.4 DEEPAK @ 2.2 PSI/min without incident. \par Pt tolerated tx well. Pt received Ace by LPN post tx. \par \par \par \par \par

## 2021-03-20 NOTE — PROCEDURE
[Outpatient] : Outpatient [Ambulatory] : Patient is ambulatory. [THIS CHAMBER HAS BEEN CLEANED / DISINFECTED] : This chamber has been cleaned / disinfected according to local and hospital policy and procedure prior to this treatment. [____] : Post-Dive: Time - [unfilled] [___] : Post-Dive: Value - [unfilled] mg/dL [Patient demonstrated and verbalized proper technique for using air break mask] : Patient demonstrated and verbalized proper technique for using air break mask [Patient educated on the risks of SMOKING prior to HBOT with understanding] : Patient educated on the risks of SMOKING prior to HBOT with understanding [Patient educated on the risks of CONSUMING ALCOHOL prior to HBOT with understanding] : Patient educated on the risks of CONSUMING ALCOHOL prior to HBOT with understanding [100% Cotton] : 100% cotton [Empty all pockets] : empty all pockets [No hair oils, wigs, hairpieces, pins] : no hair oils, wigs, hairpieces, pins  [Pre tx medications] : pre tx medications  [No make-up, creams] : no make-up, creams  [No jewelry] : no jewelry  [No matches, cigarettes, lighters] : no matches, cigarettes, lighters  [Hearing aid removed] : hearing aid removed [Dentures removed] : dentures removed [Ground bracelet on pt's wrist] : ground bracelet on pt's wrist  [Contacts removed] : contacts removed  [Remove nail polish] : remove nail polish  [No reading material] : no reading material  [Bra, undergarments removed] : bra, undergarments removed  [No contraindicated dressings] : no contraindicated dressings [Ground Wire - VISUAL Verification - Intact/Free of Obstruction] : Ground Wire - VISUAL Verification - Intact/Free of Obstruction  [Ground Continuity - Verified < 1 ohm w/ Wrist Strap [place]] : Ground Continuity - Verified < 1 ohm w/ Wrist Strap [place] [Number: ___] : Number: [unfilled] [Diagnosis: ___] : Diagnosis: [unfilled] [Clear all fields] : clear all fields [] : No [FreeTextEntry2] : 2.4 [FreeTextEntry4] : 100 [FreeTextEntry6] : 8201 [FreeTextEntry8] : 8485 [de-identified] : 9640 [de-identified] : 2748 [de-identified] : 0916 [de-identified] : 0992 [de-identified] : 6659 [de-identified] : 7631 [de-identified] : 120 minutes

## 2021-03-22 ENCOUNTER — OUTPATIENT (OUTPATIENT)
Dept: OUTPATIENT SERVICES | Facility: HOSPITAL | Age: 56
LOS: 1 days | Discharge: HOME | End: 2021-03-22
Payer: MEDICARE

## 2021-03-22 ENCOUNTER — APPOINTMENT (OUTPATIENT)
Dept: HYPERBARIC MEDICINE | Facility: HOSPITAL | Age: 56
End: 2021-03-22
Payer: MEDICARE

## 2021-03-22 ENCOUNTER — NON-APPOINTMENT (OUTPATIENT)
Age: 56
End: 2021-03-22

## 2021-03-22 VITALS
DIASTOLIC BLOOD PRESSURE: 73 MMHG | SYSTOLIC BLOOD PRESSURE: 154 MMHG | TEMPERATURE: 97.54 F | RESPIRATION RATE: 18 BRPM | OXYGEN SATURATION: 98 % | HEART RATE: 60 BPM

## 2021-03-22 VITALS
OXYGEN SATURATION: 95 % | SYSTOLIC BLOOD PRESSURE: 164 MMHG | HEART RATE: 56 BPM | RESPIRATION RATE: 20 BRPM | DIASTOLIC BLOOD PRESSURE: 83 MMHG | TEMPERATURE: 97.7 F

## 2021-03-22 DIAGNOSIS — Z94.0 KIDNEY TRANSPLANT STATUS: Chronic | ICD-10-CM

## 2021-03-22 DIAGNOSIS — N18.6 END STAGE RENAL DISEASE: Chronic | ICD-10-CM

## 2021-03-22 DIAGNOSIS — T86.828 OTHER COMPLICATIONS OF SKIN GRAFT (ALLOGRAFT) (AUTOGRAFT): ICD-10-CM

## 2021-03-22 DIAGNOSIS — Z89.421 ACQUIRED ABSENCE OF OTHER RIGHT TOE(S): Chronic | ICD-10-CM

## 2021-03-22 PROCEDURE — G0277: CPT

## 2021-03-22 PROCEDURE — 99183 HYPERBARIC OXYGEN THERAPY: CPT

## 2021-03-22 PROCEDURE — 82962 GLUCOSE BLOOD TEST: CPT

## 2021-03-23 ENCOUNTER — APPOINTMENT (OUTPATIENT)
Dept: HYPERBARIC MEDICINE | Facility: HOSPITAL | Age: 56
End: 2021-03-23
Payer: MEDICARE

## 2021-03-23 ENCOUNTER — OUTPATIENT (OUTPATIENT)
Dept: OUTPATIENT SERVICES | Facility: HOSPITAL | Age: 56
LOS: 1 days | Discharge: HOME | End: 2021-03-23
Payer: MEDICARE

## 2021-03-23 VITALS
DIASTOLIC BLOOD PRESSURE: 72 MMHG | TEMPERATURE: 97 F | OXYGEN SATURATION: 98 % | RESPIRATION RATE: 16 BRPM | SYSTOLIC BLOOD PRESSURE: 164 MMHG | HEART RATE: 65 BPM

## 2021-03-23 VITALS
HEART RATE: 66 BPM | SYSTOLIC BLOOD PRESSURE: 155 MMHG | DIASTOLIC BLOOD PRESSURE: 71 MMHG | OXYGEN SATURATION: 95 % | TEMPERATURE: 97.4 F | RESPIRATION RATE: 18 BRPM

## 2021-03-23 DIAGNOSIS — Z89.421 ACQUIRED ABSENCE OF OTHER RIGHT TOE(S): Chronic | ICD-10-CM

## 2021-03-23 DIAGNOSIS — N18.6 END STAGE RENAL DISEASE: Chronic | ICD-10-CM

## 2021-03-23 DIAGNOSIS — T86.828 OTHER COMPLICATIONS OF SKIN GRAFT (ALLOGRAFT) (AUTOGRAFT): ICD-10-CM

## 2021-03-23 DIAGNOSIS — Z94.0 KIDNEY TRANSPLANT STATUS: Chronic | ICD-10-CM

## 2021-03-23 PROCEDURE — 99183 HYPERBARIC OXYGEN THERAPY: CPT

## 2021-03-23 PROCEDURE — G0277: CPT

## 2021-03-23 PROCEDURE — 82962 GLUCOSE BLOOD TEST: CPT

## 2021-03-23 NOTE — PROCEDURE
[Outpatient] : Outpatient [Ambulatory] : Patient is ambulatory. [THIS CHAMBER HAS BEEN CLEANED / DISINFECTED] : This chamber has been cleaned / disinfected according to local and hospital policy and procedure prior to this treatment. [Patient demonstrated and verbalized proper technique for using air break mask] : Patient demonstrated and verbalized proper technique for using air break mask [Patient educated on the risks of SMOKING prior to HBOT with understanding] : Patient educated on the risks of SMOKING prior to HBOT with understanding [Patient educated on the risks of CONSUMING ALCOHOL prior to HBOT with understanding] : Patient educated on the risks of CONSUMING ALCOHOL prior to HBOT with understanding [100% Cotton] : 100% cotton [Empty all pockets] : empty all pockets [No hair oils, wigs, hairpieces, pins] : no hair oils, wigs, hairpieces, pins  [Pre tx medications] : pre tx medications  [No make-up, creams] : no make-up, creams  [No jewelry] : no jewelry  [No matches, cigarettes, lighters] : no matches, cigarettes, lighters  [Hearing aid removed] : hearing aid removed [Dentures removed] : dentures removed [Ground bracelet on pt's wrist] : ground bracelet on pt's wrist  [Contacts removed] : contacts removed  [Remove nail polish] : remove nail polish  [No reading material] : no reading material  [Bra, undergarments removed] : bra, undergarments removed  [No contraindicated dressings] : no contraindicated dressings [Ground Wire - VISUAL Verification - Intact/Free of Obstruction] : Ground Wire - VISUAL Verification - Intact/Free of Obstruction  [Ground Continuity - Verified < 1 ohm w/ Wrist Strap [place]] : Ground Continuity - Verified < 1 ohm w/ Wrist Strap [place] [Number: ___] : Number: [unfilled] [Diagnosis: ___] : Diagnosis: [unfilled] [____] : Post-Dive: Time - [unfilled] [___] : Post-Dive: Value - [unfilled] mg/dL [Clear all fields] : clear all fields [] : No [FreeTextEntry2] : 2.4 [FreeTextEntry4] : 100 mins [FreeTextEntry6] : 9780 [FreeTextEntry8] : 3630 [de-identified] : 0814 [de-identified] : 1891 [de-identified] : 2477 [de-identified] : 1093 [de-identified] : 9029 [de-identified] : 9860 [de-identified] : 120 mins

## 2021-03-23 NOTE — ADDENDUM
[FreeTextEntry1] : PT RECEIVED DRESSING CHANGE PRIOR TO DESCENT\par PT DESCENDED TO 2.4 DEEPAK @ 2.2 PSI/MIN WITHOUT INCIDENT IN CHAMBER # 2. PT'S RESTING AT TX DEPTH WITH CHEST RISE AND FALL OBSERVED CHAMBER SIDE.\par PT TOLERATED AIR BREAKS WELL.\par pt ascended from 2.4 DEEPAK@ 2.2psi/min without incident. \par Pt tolerated tx well. Pt  received ACE wrap by RN post tx.

## 2021-03-24 ENCOUNTER — APPOINTMENT (OUTPATIENT)
Dept: HYPERBARIC MEDICINE | Facility: HOSPITAL | Age: 56
End: 2021-03-24
Payer: MEDICARE

## 2021-03-24 ENCOUNTER — OUTPATIENT (OUTPATIENT)
Dept: OUTPATIENT SERVICES | Facility: HOSPITAL | Age: 56
LOS: 1 days | Discharge: HOME | End: 2021-03-24
Payer: MEDICARE

## 2021-03-24 ENCOUNTER — NON-APPOINTMENT (OUTPATIENT)
Age: 56
End: 2021-03-24

## 2021-03-24 VITALS
HEART RATE: 63 BPM | SYSTOLIC BLOOD PRESSURE: 180 MMHG | DIASTOLIC BLOOD PRESSURE: 82 MMHG | OXYGEN SATURATION: 100 % | RESPIRATION RATE: 20 BRPM | TEMPERATURE: 97.6 F

## 2021-03-24 VITALS
RESPIRATION RATE: 20 BRPM | TEMPERATURE: 97 F | DIASTOLIC BLOOD PRESSURE: 73 MMHG | OXYGEN SATURATION: 96 % | SYSTOLIC BLOOD PRESSURE: 167 MMHG | HEART RATE: 66 BPM

## 2021-03-24 DIAGNOSIS — E11.621 TYPE 2 DIABETES MELLITUS WITH FOOT ULCER: ICD-10-CM

## 2021-03-24 DIAGNOSIS — N18.6 END STAGE RENAL DISEASE: Chronic | ICD-10-CM

## 2021-03-24 DIAGNOSIS — T86.828 OTHER COMPLICATIONS OF SKIN GRAFT (ALLOGRAFT) (AUTOGRAFT): ICD-10-CM

## 2021-03-24 DIAGNOSIS — L97.512 NON-PRESSURE CHRONIC ULCER OF OTHER PART OF RIGHT FOOT WITH FAT LAYER EXPOSED: ICD-10-CM

## 2021-03-24 DIAGNOSIS — Z94.0 KIDNEY TRANSPLANT STATUS: Chronic | ICD-10-CM

## 2021-03-24 DIAGNOSIS — Z79.4 LONG TERM (CURRENT) USE OF INSULIN: ICD-10-CM
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
## 2021-03-24 DIAGNOSIS — Z89.421 ACQUIRED ABSENCE OF OTHER RIGHT TOE(S): Chronic | ICD-10-CM

## 2021-03-24 PROCEDURE — 82962 GLUCOSE BLOOD TEST: CPT

## 2021-03-24 PROCEDURE — G0277: CPT

## 2021-03-24 PROCEDURE — 99183 HYPERBARIC OXYGEN THERAPY: CPT

## 2021-03-25 ENCOUNTER — APPOINTMENT (OUTPATIENT)
Dept: OBGYN | Facility: HOSPITAL | Age: 56
End: 2021-03-25
Payer: MEDICARE

## 2021-03-25 ENCOUNTER — NON-APPOINTMENT (OUTPATIENT)
Age: 56
End: 2021-03-25

## 2021-03-25 ENCOUNTER — OUTPATIENT (OUTPATIENT)
Dept: OUTPATIENT SERVICES | Facility: HOSPITAL | Age: 56
LOS: 1 days | Discharge: HOME | End: 2021-03-25
Payer: MEDICARE

## 2021-03-25 VITALS
BODY MASS INDEX: 41.75 KG/M2 | HEIGHT: 73 IN | RESPIRATION RATE: 18 BRPM | WEIGHT: 315 LBS | OXYGEN SATURATION: 100 % | SYSTOLIC BLOOD PRESSURE: 113 MMHG | HEART RATE: 64 BPM | TEMPERATURE: 99.2 F | DIASTOLIC BLOOD PRESSURE: 65 MMHG

## 2021-03-25 DIAGNOSIS — Z89.421 ACQUIRED ABSENCE OF OTHER RIGHT TOE(S): Chronic | ICD-10-CM

## 2021-03-25 DIAGNOSIS — Z94.0 KIDNEY TRANSPLANT STATUS: Chronic | ICD-10-CM

## 2021-03-25 DIAGNOSIS — T86.828 OTHER COMPLICATIONS OF SKIN GRAFT (ALLOGRAFT) (AUTOGRAFT): ICD-10-CM

## 2021-03-25 DIAGNOSIS — N18.6 END STAGE RENAL DISEASE: Chronic | ICD-10-CM

## 2021-03-25 LAB — SARS-COV-2 RNA SPEC QL NAA+PROBE: SIGNIFICANT CHANGE UP

## 2021-03-25 PROCEDURE — U0005: CPT

## 2021-03-25 PROCEDURE — 99213 OFFICE O/P EST LOW 20 MIN: CPT

## 2021-03-25 PROCEDURE — U0003: CPT

## 2021-03-25 PROCEDURE — G0463: CPT

## 2021-03-25 NOTE — ADDENDUM
[FreeTextEntry1] : Pt received wound care prior to start of HBOT via LPN. \par Pt descended to 2.4 DEEPAK @ 2.2 PSI/MIN without incident in chamber #2. \par Pt's resting at tx depth with visible cheest rise and fall observed chamberside.\par Pt tolerated airbreaks without incident.\par Pt ascended from tx depth to surface without incident.\par Pt tolerated HBOT without incident. \par \par Post vital transcription only\par \par Cht HERMELINDO Woods 03/24/21

## 2021-03-25 NOTE — PROCEDURE
[Outpatient] : Outpatient [Ambulatory] : Patient is ambulatory. [THIS CHAMBER HAS BEEN CLEANED / DISINFECTED] : This chamber has been cleaned / disinfected according to local and hospital policy and procedure prior to this treatment. [Patient demonstrated and verbalized proper technique for using air break mask] : Patient demonstrated and verbalized proper technique for using air break mask [Patient educated on the risks of SMOKING prior to HBOT with understanding] : Patient educated on the risks of SMOKING prior to HBOT with understanding [Patient educated on the risks of CONSUMING ALCOHOL prior to HBOT with understanding] : Patient educated on the risks of CONSUMING ALCOHOL prior to HBOT with understanding [100% Cotton] : 100% cotton [Empty all pockets] : empty all pockets [No hair oils, wigs, hairpieces, pins] : no hair oils, wigs, hairpieces, pins  [Pre tx medications] : pre tx medications  [No make-up, creams] : no make-up, creams  [No jewelry] : no jewelry  [No matches, cigarettes, lighters] : no matches, cigarettes, lighters  [Hearing aid removed] : hearing aid removed [Dentures removed] : dentures removed [Ground bracelet on pt's wrist] : ground bracelet on pt's wrist  [Contacts removed] : contacts removed  [Remove nail polish] : remove nail polish  [No reading material] : no reading material  [Bra, undergarments removed] : bra, undergarments removed  [No contraindicated dressings] : no contraindicated dressings [Ground Wire - VISUAL Verification - Intact/Free of Obstruction] : Ground Wire - VISUAL Verification - Intact/Free of Obstruction  [Ground Continuity - Verified < 1 ohm w/ Wrist Strap [place]] : Ground Continuity - Verified < 1 ohm w/ Wrist Strap [place] [Clear all fields] : clear all fields [Number: ___] : Number: [unfilled] [Diagnosis: ___] : Diagnosis: [unfilled] [____] : Post-Dive: Time - [unfilled] [___] : Post-Dive: Value - [unfilled] mg/dL [] : No [FreeTextEntry4] : 100 mins [FreeTextEntry6] : 2084 [FreeTextEntry8] : 0004 [de-identified] : 0910 [de-identified] : 8463 [de-identified] : 9892 [de-identified] : 9448 [de-identified] : 3674 [de-identified] : 7532 [de-identified] : 120 mins

## 2021-03-26 ENCOUNTER — OUTPATIENT (OUTPATIENT)
Dept: OUTPATIENT SERVICES | Facility: HOSPITAL | Age: 56
LOS: 1 days | Discharge: HOME | End: 2021-03-26
Payer: MEDICARE

## 2021-03-26 ENCOUNTER — NON-APPOINTMENT (OUTPATIENT)
Age: 56
End: 2021-03-26

## 2021-03-26 ENCOUNTER — APPOINTMENT (OUTPATIENT)
Dept: HYPERBARIC MEDICINE | Facility: HOSPITAL | Age: 56
End: 2021-03-26
Payer: MEDICARE

## 2021-03-26 VITALS
RESPIRATION RATE: 18 BRPM | OXYGEN SATURATION: 96 % | DIASTOLIC BLOOD PRESSURE: 77 MMHG | SYSTOLIC BLOOD PRESSURE: 162 MMHG | HEART RATE: 63 BPM | TEMPERATURE: 98.8 F

## 2021-03-26 VITALS
HEART RATE: 58 BPM | RESPIRATION RATE: 18 BRPM | SYSTOLIC BLOOD PRESSURE: 165 MMHG | DIASTOLIC BLOOD PRESSURE: 82 MMHG | TEMPERATURE: 98.2 F | OXYGEN SATURATION: 99 %

## 2021-03-26 DIAGNOSIS — T86.828 OTHER COMPLICATIONS OF SKIN GRAFT (ALLOGRAFT) (AUTOGRAFT): ICD-10-CM

## 2021-03-26 DIAGNOSIS — Z89.421 ACQUIRED ABSENCE OF OTHER RIGHT TOE(S): Chronic | ICD-10-CM

## 2021-03-26 DIAGNOSIS — N18.6 END STAGE RENAL DISEASE: Chronic | ICD-10-CM

## 2021-03-26 DIAGNOSIS — Z94.0 KIDNEY TRANSPLANT STATUS: Chronic | ICD-10-CM

## 2021-03-26 PROCEDURE — G0277: CPT

## 2021-03-26 PROCEDURE — 99183 HYPERBARIC OXYGEN THERAPY: CPT

## 2021-03-26 PROCEDURE — 82962 GLUCOSE BLOOD TEST: CPT

## 2021-03-26 NOTE — PROCEDURE
[Outpatient] : Outpatient [Ambulatory] : Patient is ambulatory. [THIS CHAMBER HAS BEEN CLEANED / DISINFECTED] : This chamber has been cleaned / disinfected according to local and hospital policy and procedure prior to this treatment. [Patient demonstrated and verbalized proper technique for using air break mask] : Patient demonstrated and verbalized proper technique for using air break mask [Patient educated on the risks of SMOKING prior to HBOT with understanding] : Patient educated on the risks of SMOKING prior to HBOT with understanding [Patient educated on the risks of CONSUMING ALCOHOL prior to HBOT with understanding] : Patient educated on the risks of CONSUMING ALCOHOL prior to HBOT with understanding [100% Cotton] : 100% cotton [Empty all pockets] : empty all pockets [No hair oils, wigs, hairpieces, pins] : no hair oils, wigs, hairpieces, pins  [Pre tx medications] : pre tx medications  [No make-up, creams] : no make-up, creams  [No jewelry] : no jewelry  [No matches, cigarettes, lighters] : no matches, cigarettes, lighters  [Hearing aid removed] : hearing aid removed [Dentures removed] : dentures removed [Ground bracelet on pt's wrist] : ground bracelet on pt's wrist  [Contacts removed] : contacts removed  [Remove nail polish] : remove nail polish  [No reading material] : no reading material  [Bra, undergarments removed] : bra, undergarments removed  [No contraindicated dressings] : no contraindicated dressings [Ground Wire - VISUAL Verification - Intact/Free of Obstruction] : Ground Wire - VISUAL Verification - Intact/Free of Obstruction  [Ground Continuity - Verified < 1 ohm w/ Wrist Strap [place]] : Ground Continuity - Verified < 1 ohm w/ Wrist Strap [place] [Number: ___] : Number: [unfilled] [Diagnosis: ___] : Diagnosis: [unfilled] [____] : Post-Dive: Time - [unfilled] [___] : Post-Dive: Value - [unfilled] mg/dL [Clear all fields] : clear all fields [] : No [FreeTextEntry4] : 100 [FreeTextEntry6] : 1015 [FreeTextEntry8] : 1027 [de-identified] : 7495 [de-identified] : 2917 [de-identified] : 0179 [de-identified] : 8937 [de-identified] : 1200 [de-identified] : 1213 [de-identified] : 120 minutes

## 2021-03-26 NOTE — PROCEDURE
[Outpatient] : Outpatient [Ambulatory] : Patient is ambulatory. [THIS CHAMBER HAS BEEN CLEANED / DISINFECTED] : This chamber has been cleaned / disinfected according to local and hospital policy and procedure prior to this treatment. [Patient demonstrated and verbalized proper technique for using air break mask] : Patient demonstrated and verbalized proper technique for using air break mask [Patient educated on the risks of SMOKING prior to HBOT with understanding] : Patient educated on the risks of SMOKING prior to HBOT with understanding [Patient educated on the risks of CONSUMING ALCOHOL prior to HBOT with understanding] : Patient educated on the risks of CONSUMING ALCOHOL prior to HBOT with understanding [100% Cotton] : 100% cotton [Empty all pockets] : empty all pockets [No hair oils, wigs, hairpieces, pins] : no hair oils, wigs, hairpieces, pins  [Pre tx medications] : pre tx medications  [No make-up, creams] : no make-up, creams  [No jewelry] : no jewelry  [No matches, cigarettes, lighters] : no matches, cigarettes, lighters  [Hearing aid removed] : hearing aid removed [Dentures removed] : dentures removed [Ground bracelet on pt's wrist] : ground bracelet on pt's wrist  [Contacts removed] : contacts removed  [Remove nail polish] : remove nail polish  [No reading material] : no reading material  [Bra, undergarments removed] : bra, undergarments removed  [No contraindicated dressings] : no contraindicated dressings [Ground Wire - VISUAL Verification - Intact/Free of Obstruction] : Ground Wire - VISUAL Verification - Intact/Free of Obstruction  [Ground Continuity - Verified < 1 ohm w/ Wrist Strap [place]] : Ground Continuity - Verified < 1 ohm w/ Wrist Strap [place] [Number: ___] : Number: [unfilled] [Diagnosis: ___] : Diagnosis: [unfilled] [____] : Post-Dive: Time - [unfilled] [___] : Post-Dive: Value - [unfilled] mg/dL [Clear all fields] : clear all fields [] : No [FreeTextEntry4] : 100 [FreeTextEntry6] : 101 [FreeTextEntry8] : 102 [de-identified] : 6352 [de-identified] : 8265 [de-identified] : 1899 [de-identified] : 7251 [de-identified] : 1200 [de-identified] : 1216 [de-identified] : 120 minutes

## 2021-03-26 NOTE — ADDENDUM
[FreeTextEntry1] : DRAINAGE NOTED ON PT'S DRESSING PRIOR TO DESCENT . RN NOTIFIED. PT TO RECEIVE WOUND CARE POST HBOT. \par PT DESCENDED TO 2.4 DEEPAK @ 2.2 PSI/MIN WITHOUT INCIDENT IN CHAMBER # 3 .PT'S RESTING AT TX DEPTH WITH CHEST RISE AND FALL OBSERVED CHAMBER SIDE.\par PT TOLERATED AIR BREAKS WELL.\par PT ASCENDED FROM TX DEPTH WITHOUT INCIDENT. PT TOLERATED TX WELL.

## 2021-03-26 NOTE — PROCEDURE
[Outpatient] : Outpatient [Ambulatory] : Patient is ambulatory. [THIS CHAMBER HAS BEEN CLEANED / DISINFECTED] : This chamber has been cleaned / disinfected according to local and hospital policy and procedure prior to this treatment. [Patient demonstrated and verbalized proper technique for using air break mask] : Patient demonstrated and verbalized proper technique for using air break mask [Patient educated on the risks of SMOKING prior to HBOT with understanding] : Patient educated on the risks of SMOKING prior to HBOT with understanding [Patient educated on the risks of CONSUMING ALCOHOL prior to HBOT with understanding] : Patient educated on the risks of CONSUMING ALCOHOL prior to HBOT with understanding [100% Cotton] : 100% cotton [Empty all pockets] : empty all pockets [No hair oils, wigs, hairpieces, pins] : no hair oils, wigs, hairpieces, pins  [Pre tx medications] : pre tx medications  [No make-up, creams] : no make-up, creams  [No jewelry] : no jewelry  [No matches, cigarettes, lighters] : no matches, cigarettes, lighters  [Hearing aid removed] : hearing aid removed [Dentures removed] : dentures removed [Ground bracelet on pt's wrist] : ground bracelet on pt's wrist  [Contacts removed] : contacts removed  [Remove nail polish] : remove nail polish  [No reading material] : no reading material  [Bra, undergarments removed] : bra, undergarments removed  [No contraindicated dressings] : no contraindicated dressings [Ground Wire - VISUAL Verification - Intact/Free of Obstruction] : Ground Wire - VISUAL Verification - Intact/Free of Obstruction  [Ground Continuity - Verified < 1 ohm w/ Wrist Strap Gatito] : Ground Continuity - Verified < 1 ohm w/ Wrist Strap Gatito [Number: ___] : Number: [unfilled] [Diagnosis: ___] : Diagnosis: [unfilled] [____] : Post-Dive: Time - [unfilled] [___] : Post-Dive: Value - [unfilled] mg/dL [Clear all fields] : clear all fields [] : No [FreeTextEntry4] : 100 [FreeTextEntry6] : 1601 [FreeTextEntry8] : 2478 [de-identified] : 6457 [de-identified] : 7475 [de-identified] : 1211 [de-identified] : 6953 [de-identified] : 3314 [de-identified] : 8971 [de-identified] : 120 MIN

## 2021-03-26 NOTE — ADDENDUM
[FreeTextEntry1] : PT DESCENDED TO 2.4 DEEPAK @ 2.2 PSI/MIN WITHOUT INCIDENT IN CHAMBER # 2. PT'S RESTING AT TX DEPTH WITH CHEST RISE AND FALL OBSERVED CHAMBER SIDE.\par PT TOLERATED AIR BREAKS WELL.\par CARE TRANSFERRED TO TREATING TECH @ 1200 PM\par PT ASCENDED FROM TX DEPTH WITHOUT INCIDENT IN CHAMBER #2\par PT RECEIVED WOUND CARE BY NURSE POST HBOT \par PT TOLERATED TX WELL

## 2021-03-27 ENCOUNTER — APPOINTMENT (OUTPATIENT)
Dept: HYPERBARIC MEDICINE | Facility: HOSPITAL | Age: 56
End: 2021-03-27

## 2021-03-27 DIAGNOSIS — E11.621 TYPE 2 DIABETES MELLITUS WITH FOOT ULCER: ICD-10-CM

## 2021-03-27 DIAGNOSIS — T86.828 OTHER COMPLICATIONS OF SKIN GRAFT (ALLOGRAFT) (AUTOGRAFT): ICD-10-CM
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
## 2021-03-27 DIAGNOSIS — L97.512 NON-PRESSURE CHRONIC ULCER OF OTHER PART OF RIGHT FOOT WITH FAT LAYER EXPOSED: ICD-10-CM

## 2021-03-27 DIAGNOSIS — Z79.4 LONG TERM (CURRENT) USE OF INSULIN: ICD-10-CM

## 2021-03-29 ENCOUNTER — APPOINTMENT (OUTPATIENT)
Dept: HYPERBARIC MEDICINE | Facility: HOSPITAL | Age: 56
End: 2021-03-29
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
## 2021-03-29 DIAGNOSIS — L97.512 NON-PRESSURE CHRONIC ULCER OF OTHER PART OF RIGHT FOOT WITH FAT LAYER EXPOSED: ICD-10-CM

## 2021-03-29 DIAGNOSIS — Z79.4 LONG TERM (CURRENT) USE OF INSULIN: ICD-10-CM

## 2021-03-29 DIAGNOSIS — T86.828 OTHER COMPLICATIONS OF SKIN GRAFT (ALLOGRAFT) (AUTOGRAFT): ICD-10-CM

## 2021-03-29 DIAGNOSIS — E11.621 TYPE 2 DIABETES MELLITUS WITH FOOT ULCER: ICD-10-CM

## 2021-03-30 ENCOUNTER — APPOINTMENT (OUTPATIENT)
Dept: HYPERBARIC MEDICINE | Facility: HOSPITAL | Age: 56
End: 2021-03-30

## 2021-03-31 ENCOUNTER — APPOINTMENT (OUTPATIENT)
Dept: HYPERBARIC MEDICINE | Facility: HOSPITAL | Age: 56
End: 2021-03-31

## 2021-04-01 ENCOUNTER — APPOINTMENT (OUTPATIENT)
Dept: HYPERBARIC MEDICINE | Facility: HOSPITAL | Age: 56
End: 2021-04-01

## 2021-04-02 ENCOUNTER — APPOINTMENT (OUTPATIENT)
Dept: HYPERBARIC MEDICINE | Facility: HOSPITAL | Age: 56
End: 2021-04-02

## 2021-04-05 ENCOUNTER — APPOINTMENT (OUTPATIENT)
Dept: HYPERBARIC MEDICINE | Facility: HOSPITAL | Age: 56
End: 2021-04-05

## 2021-04-05 ENCOUNTER — NON-APPOINTMENT (OUTPATIENT)
Age: 56
End: 2021-04-05

## 2021-04-06 ENCOUNTER — APPOINTMENT (OUTPATIENT)
Dept: HYPERBARIC MEDICINE | Facility: HOSPITAL | Age: 56
End: 2021-04-06

## 2021-04-06 DIAGNOSIS — E78.5 HYPERLIPIDEMIA, UNSPECIFIED: ICD-10-CM
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
## 2021-04-06 DIAGNOSIS — Z94.0 KIDNEY TRANSPLANT STATUS: ICD-10-CM

## 2021-04-06 DIAGNOSIS — T86.828 OTHER COMPLICATIONS OF SKIN GRAFT (ALLOGRAFT) (AUTOGRAFT): ICD-10-CM

## 2021-04-06 DIAGNOSIS — G47.33 OBSTRUCTIVE SLEEP APNEA (ADULT) (PEDIATRIC): ICD-10-CM

## 2021-04-06 DIAGNOSIS — Z80.8 FAMILY HISTORY OF MALIGNANT NEOPLASM OF OTHER ORGANS OR SYSTEMS: ICD-10-CM

## 2021-04-06 DIAGNOSIS — Z83.3 FAMILY HISTORY OF DIABETES MELLITUS: ICD-10-CM

## 2021-04-06 DIAGNOSIS — Z80.3 FAMILY HISTORY OF MALIGNANT NEOPLASM OF BREAST: ICD-10-CM

## 2021-04-06 DIAGNOSIS — N18.30 CHRONIC KIDNEY DISEASE, STAGE 3 UNSPECIFIED: ICD-10-CM

## 2021-04-06 DIAGNOSIS — I13.10 HYPERTENSIVE HEART AND CHRONIC KIDNEY DISEASE WITHOUT HEART FAILURE, WITH STAGE 1 THROUGH STAGE 4 CHRONIC KIDNEY DISEASE, OR UNSPECIFIED CHRONIC KIDNEY DISEASE: ICD-10-CM

## 2021-04-06 DIAGNOSIS — E11.22 TYPE 2 DIABETES MELLITUS WITH DIABETIC CHRONIC KIDNEY DISEASE: ICD-10-CM

## 2021-04-06 DIAGNOSIS — E11.40 TYPE 2 DIABETES MELLITUS WITH DIABETIC NEUROPATHY, UNSPECIFIED: ICD-10-CM

## 2021-04-06 DIAGNOSIS — Z86.718 PERSONAL HISTORY OF OTHER VENOUS THROMBOSIS AND EMBOLISM: ICD-10-CM

## 2021-04-06 DIAGNOSIS — Z79.4 LONG TERM (CURRENT) USE OF INSULIN: ICD-10-CM

## 2021-04-06 DIAGNOSIS — Z85.828 PERSONAL HISTORY OF OTHER MALIGNANT NEOPLASM OF SKIN: ICD-10-CM

## 2021-04-06 DIAGNOSIS — Z89.422 ACQUIRED ABSENCE OF OTHER LEFT TOE(S): ICD-10-CM

## 2021-04-06 DIAGNOSIS — Z89.421 ACQUIRED ABSENCE OF OTHER RIGHT TOE(S): ICD-10-CM

## 2021-04-06 DIAGNOSIS — Z86.73 PERSONAL HISTORY OF TRANSIENT ISCHEMIC ATTACK (TIA), AND CEREBRAL INFARCTION WITHOUT RESIDUAL DEFICITS: ICD-10-CM

## 2021-04-06 DIAGNOSIS — Z79.82 LONG TERM (CURRENT) USE OF ASPIRIN: ICD-10-CM

## 2021-04-06 DIAGNOSIS — Z20.822 CONTACT WITH AND (SUSPECTED) EXPOSURE TO COVID-19: ICD-10-CM

## 2021-04-06 DIAGNOSIS — E11.621 TYPE 2 DIABETES MELLITUS WITH FOOT ULCER: ICD-10-CM

## 2021-04-06 DIAGNOSIS — Z79.899 OTHER LONG TERM (CURRENT) DRUG THERAPY: ICD-10-CM

## 2021-04-06 DIAGNOSIS — Z87.81 PERSONAL HISTORY OF (HEALED) TRAUMATIC FRACTURE: ICD-10-CM

## 2021-04-06 DIAGNOSIS — L97.512 NON-PRESSURE CHRONIC ULCER OF OTHER PART OF RIGHT FOOT WITH FAT LAYER EXPOSED: ICD-10-CM

## 2021-04-06 NOTE — HISTORY OF PRESENT ILLNESS
[FreeTextEntry1] : 56 yo WM, being seen for an assessment today. Presently receiving HBO tx for a comp. flap. Tolerating HBO well. No c/o.

## 2021-04-06 NOTE — ASSESSMENT
[] : No [FreeTextEntry2] : Restore Skin Integrity\par Infection Control\par Hyperbaric Therapy\par Localized wound care\par Maintain acceptable pain levels at satisfactory relief.\par Demonstrates use of both nonpharmacological and pharmacological pain relief strategies [FreeTextEntry4] : Photos Taken\par HBOT 20/30\par COVID 19 PCR Swab Obtained and sent to lab per MD orders\par DPM stated no Additional HBOT needed at this time, patient to complete 30 Treatments at this time\par F/U to [facility] Daily HBOT, 2 weeks for assessment

## 2021-04-06 NOTE — PHYSICAL EXAM
[JVD] : no jugular venous distention  [Abdomen Masses] : No abdominal massess [Abdomen Tenderness] : ~T ~M No abdominal tenderness [Tender] : nontender [Enlarged] : not enlarged [de-identified] : adult WM, NAD, alert, Ox 3. [FreeTextEntry1] : Right 3rd Distal Digit Amp Site- Dry Eschar [de-identified] : Intact [de-identified] : Dry Protective Dressing [de-identified] : Cleansed with Normal Saline\par  [FreeTextEntry7] : Right Plantar Hallux [FreeTextEntry8] : 1.9 [FreeTextEntry9] : 2.1 [de-identified] : 0.1 [de-identified] : Serosanguineous [de-identified] : Intact [de-identified] : Silver Alginate [de-identified] : Cleansed with Normal Saline\par  [TWNoteComboBox4] : None [de-identified] : Yes [TWNoteComboBox5] : No [de-identified] : 3x Weekly [de-identified] : No [de-identified] : Small [de-identified] : No [de-identified] : None [de-identified] : No [de-identified] : 100% [de-identified] : 3x Weekly

## 2021-04-07 ENCOUNTER — APPOINTMENT (OUTPATIENT)
Dept: HYPERBARIC MEDICINE | Facility: HOSPITAL | Age: 56
End: 2021-04-07

## 2021-04-07 ENCOUNTER — NON-APPOINTMENT (OUTPATIENT)
Age: 56
End: 2021-04-07

## 2021-04-08 ENCOUNTER — APPOINTMENT (OUTPATIENT)
Dept: HYPERBARIC MEDICINE | Facility: HOSPITAL | Age: 56
End: 2021-04-08

## 2021-04-08 NOTE — PHYSICAL THERAPY INITIAL EVALUATION ADULT - ASR WT BEARING STATUS EVAL
Patient has follow up scheduled for 5/12/21. Please review and sign attached prescription if you would like him to continue this medication until follow up appointment.
BLE

## 2021-04-09 ENCOUNTER — APPOINTMENT (OUTPATIENT)
Dept: HYPERBARIC MEDICINE | Facility: HOSPITAL | Age: 56
End: 2021-04-09

## 2021-04-12 ENCOUNTER — APPOINTMENT (OUTPATIENT)
Dept: HYPERBARIC MEDICINE | Facility: HOSPITAL | Age: 56
End: 2021-04-12

## 2021-04-13 ENCOUNTER — APPOINTMENT (OUTPATIENT)
Dept: HYPERBARIC MEDICINE | Facility: HOSPITAL | Age: 56
End: 2021-04-13

## 2021-04-14 ENCOUNTER — APPOINTMENT (OUTPATIENT)
Dept: HYPERBARIC MEDICINE | Facility: HOSPITAL | Age: 56
End: 2021-04-14

## 2021-04-15 ENCOUNTER — APPOINTMENT (OUTPATIENT)
Dept: HYPERBARIC MEDICINE | Facility: HOSPITAL | Age: 56
End: 2021-04-15

## 2021-04-16 ENCOUNTER — APPOINTMENT (OUTPATIENT)
Dept: HYPERBARIC MEDICINE | Facility: HOSPITAL | Age: 56
End: 2021-04-16

## 2021-04-19 ENCOUNTER — APPOINTMENT (OUTPATIENT)
Dept: HYPERBARIC MEDICINE | Facility: HOSPITAL | Age: 56
End: 2021-04-19

## 2021-04-19 ENCOUNTER — NON-APPOINTMENT (OUTPATIENT)
Age: 56
End: 2021-04-19

## 2021-04-20 ENCOUNTER — OUTPATIENT (OUTPATIENT)
Dept: OUTPATIENT SERVICES | Facility: HOSPITAL | Age: 56
LOS: 1 days | Discharge: HOME | End: 2021-04-20
Payer: MEDICARE

## 2021-04-20 ENCOUNTER — APPOINTMENT (OUTPATIENT)
Dept: HYPERBARIC MEDICINE | Facility: HOSPITAL | Age: 56
End: 2021-04-20
Payer: MEDICARE

## 2021-04-20 VITALS
OXYGEN SATURATION: 94 % | RESPIRATION RATE: 18 BRPM | BODY MASS INDEX: 41.08 KG/M2 | WEIGHT: 310 LBS | TEMPERATURE: 97.1 F | HEART RATE: 62 BPM | SYSTOLIC BLOOD PRESSURE: 149 MMHG | DIASTOLIC BLOOD PRESSURE: 81 MMHG | HEIGHT: 73 IN

## 2021-04-20 DIAGNOSIS — Z89.421 ACQUIRED ABSENCE OF OTHER RIGHT TOE(S): Chronic | ICD-10-CM

## 2021-04-20 DIAGNOSIS — E11.621 TYPE 2 DIABETES MELLITUS WITH FOOT ULCER: ICD-10-CM

## 2021-04-20 DIAGNOSIS — N18.6 END STAGE RENAL DISEASE: Chronic | ICD-10-CM

## 2021-04-20 DIAGNOSIS — Z94.0 KIDNEY TRANSPLANT STATUS: Chronic | ICD-10-CM

## 2021-04-20 LAB — SARS-COV-2 RNA SPEC QL NAA+PROBE: SIGNIFICANT CHANGE UP

## 2021-04-20 PROCEDURE — U0003: CPT

## 2021-04-20 PROCEDURE — G0463: CPT

## 2021-04-20 PROCEDURE — 99213 OFFICE O/P EST LOW 20 MIN: CPT

## 2021-04-20 PROCEDURE — U0005: CPT

## 2021-04-21 ENCOUNTER — OUTPATIENT (OUTPATIENT)
Dept: OUTPATIENT SERVICES | Facility: HOSPITAL | Age: 56
LOS: 1 days | Discharge: HOME | End: 2021-04-21
Payer: MEDICARE

## 2021-04-21 ENCOUNTER — APPOINTMENT (OUTPATIENT)
Dept: HYPERBARIC MEDICINE | Facility: HOSPITAL | Age: 56
End: 2021-04-21
Payer: MEDICARE

## 2021-04-21 VITALS
OXYGEN SATURATION: 91 % | HEART RATE: 65 BPM | RESPIRATION RATE: 18 BRPM | SYSTOLIC BLOOD PRESSURE: 152 MMHG | DIASTOLIC BLOOD PRESSURE: 77 MMHG | TEMPERATURE: 97.9 F

## 2021-04-21 VITALS
HEART RATE: 61 BPM | OXYGEN SATURATION: 97 % | DIASTOLIC BLOOD PRESSURE: 65 MMHG | TEMPERATURE: 97.2 F | RESPIRATION RATE: 18 BRPM | SYSTOLIC BLOOD PRESSURE: 155 MMHG

## 2021-04-21 DIAGNOSIS — L97.512 NON-PRESSURE CHRONIC ULCER OF OTHER PART OF RIGHT FOOT WITH FAT LAYER EXPOSED: ICD-10-CM

## 2021-04-21 DIAGNOSIS — Z20.822 CONTACT WITH AND (SUSPECTED) EXPOSURE TO COVID-19: ICD-10-CM

## 2021-04-21 DIAGNOSIS — T86.828 OTHER COMPLICATIONS OF SKIN GRAFT (ALLOGRAFT) (AUTOGRAFT): ICD-10-CM

## 2021-04-21 DIAGNOSIS — E11.621 TYPE 2 DIABETES MELLITUS WITH FOOT ULCER: ICD-10-CM

## 2021-04-21 DIAGNOSIS — Z94.0 KIDNEY TRANSPLANT STATUS: Chronic | ICD-10-CM

## 2021-04-21 DIAGNOSIS — N18.6 END STAGE RENAL DISEASE: Chronic | ICD-10-CM
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
## 2021-04-21 DIAGNOSIS — Z79.4 LONG TERM (CURRENT) USE OF INSULIN: ICD-10-CM

## 2021-04-21 DIAGNOSIS — Z89.421 ACQUIRED ABSENCE OF OTHER RIGHT TOE(S): Chronic | ICD-10-CM

## 2021-04-21 PROCEDURE — 99183 HYPERBARIC OXYGEN THERAPY: CPT

## 2021-04-21 PROCEDURE — 82962 GLUCOSE BLOOD TEST: CPT

## 2021-04-21 PROCEDURE — G0277: CPT

## 2021-04-22 ENCOUNTER — OUTPATIENT (OUTPATIENT)
Dept: OUTPATIENT SERVICES | Facility: HOSPITAL | Age: 56
LOS: 1 days | Discharge: HOME | End: 2021-04-22
Payer: MEDICARE

## 2021-04-22 ENCOUNTER — APPOINTMENT (OUTPATIENT)
Dept: HYPERBARIC MEDICINE | Facility: HOSPITAL | Age: 56
End: 2021-04-22
Payer: MEDICARE

## 2021-04-22 VITALS
RESPIRATION RATE: 18 BRPM | DIASTOLIC BLOOD PRESSURE: 80 MMHG | HEART RATE: 62 BPM | OXYGEN SATURATION: 96 % | SYSTOLIC BLOOD PRESSURE: 177 MMHG | TEMPERATURE: 97 F

## 2021-04-22 VITALS
RESPIRATION RATE: 18 BRPM | HEART RATE: 62 BPM | OXYGEN SATURATION: 95 % | TEMPERATURE: 97 F | SYSTOLIC BLOOD PRESSURE: 192 MMHG | DIASTOLIC BLOOD PRESSURE: 85 MMHG

## 2021-04-22 DIAGNOSIS — Z86.718 PERSONAL HISTORY OF OTHER VENOUS THROMBOSIS AND EMBOLISM: ICD-10-CM

## 2021-04-22 DIAGNOSIS — T86.828 OTHER COMPLICATIONS OF SKIN GRAFT (ALLOGRAFT) (AUTOGRAFT): ICD-10-CM

## 2021-04-22 DIAGNOSIS — Z94.0 KIDNEY TRANSPLANT STATUS: Chronic | ICD-10-CM
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
## 2021-04-22 DIAGNOSIS — Z79.82 LONG TERM (CURRENT) USE OF ASPIRIN: ICD-10-CM

## 2021-04-22 DIAGNOSIS — Z86.73 PERSONAL HISTORY OF TRANSIENT ISCHEMIC ATTACK (TIA), AND CEREBRAL INFARCTION WITHOUT RESIDUAL DEFICITS: ICD-10-CM

## 2021-04-22 DIAGNOSIS — I13.10 HYPERTENSIVE HEART AND CHRONIC KIDNEY DISEASE WITHOUT HEART FAILURE, WITH STAGE 1 THROUGH STAGE 4 CHRONIC KIDNEY DISEASE, OR UNSPECIFIED CHRONIC KIDNEY DISEASE: ICD-10-CM

## 2021-04-22 DIAGNOSIS — G47.33 OBSTRUCTIVE SLEEP APNEA (ADULT) (PEDIATRIC): ICD-10-CM

## 2021-04-22 DIAGNOSIS — Z79.4 LONG TERM (CURRENT) USE OF INSULIN: ICD-10-CM

## 2021-04-22 DIAGNOSIS — E11.621 TYPE 2 DIABETES MELLITUS WITH FOOT ULCER: ICD-10-CM

## 2021-04-22 DIAGNOSIS — Z89.422 ACQUIRED ABSENCE OF OTHER LEFT TOE(S): ICD-10-CM

## 2021-04-22 DIAGNOSIS — Z79.899 OTHER LONG TERM (CURRENT) DRUG THERAPY: ICD-10-CM

## 2021-04-22 DIAGNOSIS — E11.22 TYPE 2 DIABETES MELLITUS WITH DIABETIC CHRONIC KIDNEY DISEASE: ICD-10-CM

## 2021-04-22 DIAGNOSIS — N18.30 CHRONIC KIDNEY DISEASE, STAGE 3 UNSPECIFIED: ICD-10-CM

## 2021-04-22 DIAGNOSIS — Z20.822 CONTACT WITH AND (SUSPECTED) EXPOSURE TO COVID-19: ICD-10-CM

## 2021-04-22 DIAGNOSIS — E78.5 HYPERLIPIDEMIA, UNSPECIFIED: ICD-10-CM

## 2021-04-22 DIAGNOSIS — Z87.81 PERSONAL HISTORY OF (HEALED) TRAUMATIC FRACTURE: ICD-10-CM

## 2021-04-22 DIAGNOSIS — Z85.828 PERSONAL HISTORY OF OTHER MALIGNANT NEOPLASM OF SKIN: ICD-10-CM

## 2021-04-22 DIAGNOSIS — Z83.3 FAMILY HISTORY OF DIABETES MELLITUS: ICD-10-CM

## 2021-04-22 DIAGNOSIS — Z80.3 FAMILY HISTORY OF MALIGNANT NEOPLASM OF BREAST: ICD-10-CM

## 2021-04-22 DIAGNOSIS — E11.40 TYPE 2 DIABETES MELLITUS WITH DIABETIC NEUROPATHY, UNSPECIFIED: ICD-10-CM

## 2021-04-22 DIAGNOSIS — Z89.421 ACQUIRED ABSENCE OF OTHER RIGHT TOE(S): ICD-10-CM

## 2021-04-22 DIAGNOSIS — Z89.421 ACQUIRED ABSENCE OF OTHER RIGHT TOE(S): Chronic | ICD-10-CM

## 2021-04-22 DIAGNOSIS — N18.6 END STAGE RENAL DISEASE: Chronic | ICD-10-CM

## 2021-04-22 DIAGNOSIS — L97.512 NON-PRESSURE CHRONIC ULCER OF OTHER PART OF RIGHT FOOT WITH FAT LAYER EXPOSED: ICD-10-CM

## 2021-04-22 DIAGNOSIS — Z80.8 FAMILY HISTORY OF MALIGNANT NEOPLASM OF OTHER ORGANS OR SYSTEMS: ICD-10-CM

## 2021-04-22 PROCEDURE — G0277: CPT

## 2021-04-22 PROCEDURE — 82962 GLUCOSE BLOOD TEST: CPT

## 2021-04-22 PROCEDURE — 99183 HYPERBARIC OXYGEN THERAPY: CPT

## 2021-04-22 NOTE — ADDENDUM
[FreeTextEntry1] : PT RECEIVED OFF LOADING MEASURES PRE HBOT \par PTS INSULIN PORT WAS COVERED WITH ABD PAD AND PAPER TAPE\par PT DESCENDED TO 2.4 DEEPAK @ 2.2 PSI/MIN WITHOUT INCIDENT IN CHAMBER #3\par PT RESTING AT TX DEPTH WITH VISIBLE CHEST RISE AND FALL OBSERVED CHAMBERSIDE \par PT TOLERATED BOTH AIR BREAKS WELL \par PT ASCENDED FROM TX DEPTH WITHOUT INCIDENT IN CHAMBER #3\par PT TOLERATED TX WELL

## 2021-04-22 NOTE — PROCEDURE
[Outpatient] : Outpatient [Ambulatory] : Patient is ambulatory. [THIS CHAMBER HAS BEEN CLEANED / DISINFECTED] : This chamber has been cleaned / disinfected according to local and hospital policy and procedure prior to this treatment. [Patient demonstrated and verbalized proper technique for using air break mask] : Patient demonstrated and verbalized proper technique for using air break mask [Patient educated on the risks of SMOKING prior to HBOT with understanding] : Patient educated on the risks of SMOKING prior to HBOT with understanding [Patient educated on the risks of CONSUMING ALCOHOL prior to HBOT with understanding] : Patient educated on the risks of CONSUMING ALCOHOL prior to HBOT with understanding [100% Cotton] : 100% cotton [Empty all pockets] : empty all pockets [No hair oils, wigs, hairpieces, pins] : no hair oils, wigs, hairpieces, pins  [Pre tx medications] : pre tx medications  [No make-up, creams] : no make-up, creams  [No jewelry] : no jewelry  [No matches, cigarettes, lighters] : no matches, cigarettes, lighters  [Hearing aid removed] : hearing aid removed [Dentures removed] : dentures removed [Ground bracelet on pt's wrist] : ground bracelet on pt's wrist  [Contacts removed] : contacts removed  [Remove nail polish] : remove nail polish  [No reading material] : no reading material  [Bra, undergarments removed] : bra, undergarments removed  [No contraindicated dressings] : no contraindicated dressings [Ground Wire - VISUAL Verification - Intact/Free of Obstruction] : Ground Wire - VISUAL Verification - Intact/Free of Obstruction  [Ground Continuity - Verified < 1 ohm w/ Wrist Strap Gatito] : Ground Continuity - Verified < 1 ohm w/ Wrist Strap Gatito [Number: ___] : Number: [unfilled] [Diagnosis: ___] : Diagnosis: [unfilled] [____] : Post-Dive: Time - [unfilled] [___] : Post-Dive: Value - [unfilled] mg/dL [Clear all fields] : clear all fields [] : No [FreeTextEntry4] : 100 [FreeTextEntry6] : 9291 [FreeTextEntry8] : 8082 [de-identified] : 5659 [de-identified] : 9682 [de-identified] : 1618 [de-identified] : 5784 [de-identified] : 181 [de-identified] : 1829 [de-identified] : 120 minutes

## 2021-04-22 NOTE — PROCEDURE
[Outpatient] : Outpatient [Ambulatory] : Patient is ambulatory. [THIS CHAMBER HAS BEEN CLEANED / DISINFECTED] : This chamber has been cleaned / disinfected according to local and hospital policy and procedure prior to this treatment. [Patient demonstrated and verbalized proper technique for using air break mask] : Patient demonstrated and verbalized proper technique for using air break mask [Patient educated on the risks of SMOKING prior to HBOT with understanding] : Patient educated on the risks of SMOKING prior to HBOT with understanding [Patient educated on the risks of CONSUMING ALCOHOL prior to HBOT with understanding] : Patient educated on the risks of CONSUMING ALCOHOL prior to HBOT with understanding [100% Cotton] : 100% cotton [Empty all pockets] : empty all pockets [No hair oils, wigs, hairpieces, pins] : no hair oils, wigs, hairpieces, pins  [Pre tx medications] : pre tx medications  [No make-up, creams] : no make-up, creams  [No jewelry] : no jewelry  [No matches, cigarettes, lighters] : no matches, cigarettes, lighters  [Hearing aid removed] : hearing aid removed [Dentures removed] : dentures removed [Ground bracelet on pt's wrist] : ground bracelet on pt's wrist  [Contacts removed] : contacts removed  [Remove nail polish] : remove nail polish  [No reading material] : no reading material  [Bra, undergarments removed] : bra, undergarments removed  [No contraindicated dressings] : no contraindicated dressings [Ground Wire - VISUAL Verification - Intact/Free of Obstruction] : Ground Wire - VISUAL Verification - Intact/Free of Obstruction  [Ground Continuity - Verified < 1 ohm w/ Wrist Strap Gatito] : Ground Continuity - Verified < 1 ohm w/ Wrist Strap Gatito [Number: ___] : Number: [unfilled] [Diagnosis: ___] : Diagnosis: [unfilled] [____] : Post-Dive: Time - [unfilled] [___] : Post-Dive: Value - [unfilled] mg/dL [Clear all fields] : clear all fields [] : No [FreeTextEntry4] : 100 [FreeTextEntry6] : 4594 [FreeTextEntry8] : 3162 [de-identified] : 5959 [de-identified] : 0608 [de-identified] : 1985 [de-identified] : 1750 [de-identified] : 1821 [de-identified] : 1837 [de-identified] : 120 minutes

## 2021-04-22 NOTE — ADDENDUM
[FreeTextEntry1] : PT RECEIVED DRESSING CHANGE BY LPN PRE HBOT \par PT DESCENDED TO 2.4 DEEPAK @ 2.2 PSI/MIN WITHOUT INCIDENT IN CHAMBER #3\par PT RESTING AT TX DEPTH WITH VISIBLE CHEST RISE AND FALL OBSERVED CHAMBERSIDE \par PT TOLERATED BOTH AIR BREAKS WELL\par PT ASCENDED FROM TX DEPTH WITHOUT INCIDENT IN CHAMBER #1\par PT TOLERATED TX WELL

## 2021-04-23 ENCOUNTER — OUTPATIENT (OUTPATIENT)
Dept: OUTPATIENT SERVICES | Facility: HOSPITAL | Age: 56
LOS: 1 days | Discharge: HOME | End: 2021-04-23
Payer: MEDICARE

## 2021-04-23 ENCOUNTER — NON-APPOINTMENT (OUTPATIENT)
Age: 56
End: 2021-04-23

## 2021-04-23 ENCOUNTER — APPOINTMENT (OUTPATIENT)
Dept: HYPERBARIC MEDICINE | Facility: HOSPITAL | Age: 56
End: 2021-04-23
Payer: MEDICARE

## 2021-04-23 VITALS
DIASTOLIC BLOOD PRESSURE: 82 MMHG | OXYGEN SATURATION: 98 % | HEART RATE: 64 BPM | RESPIRATION RATE: 20 BRPM | TEMPERATURE: 98.3 F | SYSTOLIC BLOOD PRESSURE: 190 MMHG

## 2021-04-23 VITALS — SYSTOLIC BLOOD PRESSURE: 177 MMHG | DIASTOLIC BLOOD PRESSURE: 85 MMHG

## 2021-04-23 VITALS
OXYGEN SATURATION: 93 % | HEART RATE: 72 BPM | RESPIRATION RATE: 18 BRPM | TEMPERATURE: 97.8 F | SYSTOLIC BLOOD PRESSURE: 137 MMHG | DIASTOLIC BLOOD PRESSURE: 73 MMHG

## 2021-04-23 DIAGNOSIS — Z94.0 KIDNEY TRANSPLANT STATUS: Chronic | ICD-10-CM

## 2021-04-23 DIAGNOSIS — N18.6 END STAGE RENAL DISEASE: Chronic | ICD-10-CM

## 2021-04-23 DIAGNOSIS — Z89.421 ACQUIRED ABSENCE OF OTHER RIGHT TOE(S): Chronic | ICD-10-CM

## 2021-04-23 DIAGNOSIS — T86.828 OTHER COMPLICATIONS OF SKIN GRAFT (ALLOGRAFT) (AUTOGRAFT): ICD-10-CM

## 2021-04-23 PROCEDURE — 99183 HYPERBARIC OXYGEN THERAPY: CPT

## 2021-04-23 PROCEDURE — 82962 GLUCOSE BLOOD TEST: CPT

## 2021-04-23 PROCEDURE — G0277: CPT

## 2021-04-24 ENCOUNTER — OUTPATIENT (OUTPATIENT)
Dept: OUTPATIENT SERVICES | Facility: HOSPITAL | Age: 56
LOS: 1 days | Discharge: HOME | End: 2021-04-24
Payer: MEDICARE

## 2021-04-24 ENCOUNTER — APPOINTMENT (OUTPATIENT)
Dept: HYPERBARIC MEDICINE | Facility: HOSPITAL | Age: 56
End: 2021-04-24
Payer: MEDICARE

## 2021-04-24 VITALS
SYSTOLIC BLOOD PRESSURE: 168 MMHG | TEMPERATURE: 97.1 F | DIASTOLIC BLOOD PRESSURE: 78 MMHG | RESPIRATION RATE: 16 BRPM | HEART RATE: 80 BPM | OXYGEN SATURATION: 98 %

## 2021-04-24 VITALS
OXYGEN SATURATION: 99 % | TEMPERATURE: 97.1 F | DIASTOLIC BLOOD PRESSURE: 80 MMHG | RESPIRATION RATE: 16 BRPM | HEART RATE: 80 BPM | SYSTOLIC BLOOD PRESSURE: 171 MMHG

## 2021-04-24 DIAGNOSIS — L97.512 NON-PRESSURE CHRONIC ULCER OF OTHER PART OF RIGHT FOOT WITH FAT LAYER EXPOSED: ICD-10-CM

## 2021-04-24 DIAGNOSIS — E11.621 TYPE 2 DIABETES MELLITUS WITH FOOT ULCER: ICD-10-CM
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
## 2021-04-24 DIAGNOSIS — Z79.4 LONG TERM (CURRENT) USE OF INSULIN: ICD-10-CM

## 2021-04-24 DIAGNOSIS — Z94.0 KIDNEY TRANSPLANT STATUS: Chronic | ICD-10-CM

## 2021-04-24 DIAGNOSIS — N18.6 END STAGE RENAL DISEASE: Chronic | ICD-10-CM

## 2021-04-24 DIAGNOSIS — T86.828 OTHER COMPLICATIONS OF SKIN GRAFT (ALLOGRAFT) (AUTOGRAFT): ICD-10-CM

## 2021-04-24 DIAGNOSIS — Z89.421 ACQUIRED ABSENCE OF OTHER RIGHT TOE(S): Chronic | ICD-10-CM

## 2021-04-24 PROCEDURE — 99183 HYPERBARIC OXYGEN THERAPY: CPT

## 2021-04-24 PROCEDURE — G0277: CPT

## 2021-04-24 PROCEDURE — 82962 GLUCOSE BLOOD TEST: CPT

## 2021-04-24 NOTE — ASSESSMENT
[No change from previous assessment] : No change from previous assessment [Patient prepared for dive] : Patient prepared for dive [Patient descended without problem for 9 minutes] : Patient descended without problem for 9 minutes [No dizziness or thirst] :  No dizziness or thirst [No ear problems] : No ear problems [Vital signs stable] : Vital signs stable [Tolerating dive well] : Tolerating dive well [No Chest Pain, shortness of breath] : No Chest Pain, shortness of breath [Respiratory Rate Stable] : Respiratory Rate Stable [No chest pain, shortness of breath, or ear pain] :  No chest pain, shortness of breath, or ear pain  [Tolerated Ascent well] : Tolerated Ascent well [Vital Signs stable] : Vital Signs stable [A physician was present throughout the entire HBOT] : A physician was present throughout the entire HBOT [No] : No [Clinically Stable] : Clinically stable [Continue Treatment Plan] : Continue treatment plan [0] : 0 out of 10 [FreeTextEntry2] : none

## 2021-04-24 NOTE — ADDENDUM
[FreeTextEntry1] : Pt descended to 2.4 DEEPAK @ 2.2 PSI/min without incident in chamber #2\par Pt resting @ depth with chest rise and fall observed throughout tx. \par Pt tolerated air breaks well. \par Pt ascended from 2.4 DEEPAK @ 2.2 PSI/min without incident. \par Pt tolerated tx well.\par

## 2021-04-24 NOTE — PROCEDURE
[Outpatient] : Outpatient [Ambulatory] : Patient is ambulatory. [THIS CHAMBER HAS BEEN CLEANED / DISINFECTED] : This chamber has been cleaned / disinfected according to local and hospital policy and procedure prior to this treatment. [Patient demonstrated and verbalized proper technique for using air break mask] : Patient demonstrated and verbalized proper technique for using air break mask [Patient educated on the risks of SMOKING prior to HBOT with understanding] : Patient educated on the risks of SMOKING prior to HBOT with understanding [Patient educated on the risks of CONSUMING ALCOHOL prior to HBOT with understanding] : Patient educated on the risks of CONSUMING ALCOHOL prior to HBOT with understanding [100% Cotton] : 100% cotton [Empty all pockets] : empty all pockets [No hair oils, wigs, hairpieces, pins] : no hair oils, wigs, hairpieces, pins  [Pre tx medications] : pre tx medications  [No make-up, creams] : no make-up, creams  [No jewelry] : no jewelry  [No matches, cigarettes, lighters] : no matches, cigarettes, lighters  [Hearing aid removed] : hearing aid removed [Dentures removed] : dentures removed [Ground bracelet on pt's wrist] : ground bracelet on pt's wrist  [Contacts removed] : contacts removed  [Remove nail polish] : remove nail polish  [No reading material] : no reading material  [Bra, undergarments removed] : bra, undergarments removed  [No contraindicated dressings] : no contraindicated dressings [Ground Wire - VISUAL Verification - Intact/Free of Obstruction] : Ground Wire - VISUAL Verification - Intact/Free of Obstruction  [Ground Continuity - Verified < 1 ohm w/ Wrist Strap Gatito] : Ground Continuity - Verified < 1 ohm w/ Wrist Strap Gatito [Diagnosis: ___] : Diagnosis: [unfilled] [____] : Pre-Dive: Time - [unfilled] [___] : Pre-Dive: Value - [unfilled] mg/dL [Number: ___] : Number: [unfilled] [Clear all fields] : clear all fields [] : No [FreeTextEntry2] : 2.4 [FreeTextEntry4] : 100 mins [FreeTextEntry6] : 9339 [FreeTextEntry8] : 0807 [de-identified] : 3874 [de-identified] : 2075 [de-identified] : 0964 [de-identified] : 4930 [de-identified] : 5319 [de-identified] : 6456 [de-identified] : 120 minutes

## 2021-04-26 ENCOUNTER — APPOINTMENT (OUTPATIENT)
Dept: HYPERBARIC MEDICINE | Facility: HOSPITAL | Age: 56
End: 2021-04-26
Payer: MEDICARE

## 2021-04-26 ENCOUNTER — OUTPATIENT (OUTPATIENT)
Dept: OUTPATIENT SERVICES | Facility: HOSPITAL | Age: 56
LOS: 1 days | Discharge: HOME | End: 2021-04-26
Payer: MEDICARE

## 2021-04-26 VITALS
DIASTOLIC BLOOD PRESSURE: 81 MMHG | TEMPERATURE: 97.2 F | SYSTOLIC BLOOD PRESSURE: 175 MMHG | OXYGEN SATURATION: 97 % | HEART RATE: 62 BPM | RESPIRATION RATE: 18 BRPM

## 2021-04-26 VITALS
DIASTOLIC BLOOD PRESSURE: 82 MMHG | TEMPERATURE: 98 F | HEART RATE: 59 BPM | RESPIRATION RATE: 20 BRPM | OXYGEN SATURATION: 97 % | SYSTOLIC BLOOD PRESSURE: 178 MMHG
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
## 2021-04-26 DIAGNOSIS — L97.512 NON-PRESSURE CHRONIC ULCER OF OTHER PART OF RIGHT FOOT WITH FAT LAYER EXPOSED: ICD-10-CM

## 2021-04-26 DIAGNOSIS — T86.828 OTHER COMPLICATIONS OF SKIN GRAFT (ALLOGRAFT) (AUTOGRAFT): ICD-10-CM

## 2021-04-26 DIAGNOSIS — N18.6 END STAGE RENAL DISEASE: Chronic | ICD-10-CM

## 2021-04-26 DIAGNOSIS — Z79.4 LONG TERM (CURRENT) USE OF INSULIN: ICD-10-CM

## 2021-04-26 DIAGNOSIS — Z89.421 ACQUIRED ABSENCE OF OTHER RIGHT TOE(S): Chronic | ICD-10-CM

## 2021-04-26 DIAGNOSIS — Z94.0 KIDNEY TRANSPLANT STATUS: Chronic | ICD-10-CM

## 2021-04-26 DIAGNOSIS — E11.621 TYPE 2 DIABETES MELLITUS WITH FOOT ULCER: ICD-10-CM

## 2021-04-26 PROCEDURE — 82962 GLUCOSE BLOOD TEST: CPT

## 2021-04-26 PROCEDURE — 99183 HYPERBARIC OXYGEN THERAPY: CPT

## 2021-04-26 PROCEDURE — G0277: CPT

## 2021-04-26 NOTE — ADDENDUM
[FreeTextEntry1] : PT RECEIVED OFF LOADING MEASURES PRE HBOT \par PTS INSULIN PORT COVERED WITH ABD PAD AND PAPER TAPE PRE HBOT\par PT RECEIVED DRESSING CHANGE PRE HBOT \par PT DESCENDED TO 2.4 DEEPAK @ 2.2 PSI/MIN WITHOUT INCIDENT IN CHAMBER #3\par PT RESTING AT TX DEPTH WITH VISIBLE CHEST RISE AND FALL OBSERVED CHAMBERSIDE \par PT ASCENDED FROM TX DEPTH WITHOUT INCIDENT IN CHAMBER #3\par PT TOLERATED TX WELL

## 2021-04-26 NOTE — ADDENDUM
[FreeTextEntry1] : PT RECEIVED OFF LOADING MEASURES PRE HBOT \par PTS INSULIN PORT WAS COVERED WITH ABD PAD AND PAPER TAPE PRE HBOT \par PT DESCENDED TO 2.4 DEEPAK @ 2.2 PSI/MIN WITHOUT INCIDENT IN CHAMBER #1\par PT RESTING AT TX DEPTH WITH VISIBLE CHEST RISE AND FALL OBSERVED CHAMBERSIDE \par PT TOLERATED BOTH AIR BREAKS WELL \par PT ASCENDED FROM TX DEPTH WITHOUT INCIDENT IN CHAMBER #1\par PT TOLERATED TX WELL

## 2021-04-26 NOTE — ASSESSMENT
[No change from previous assessment] : No change from previous assessment [Patient undergoing HBO treatment for __________] : Patient undergoing HBO treatment for [unfilled] [Patient prepared for dive] : Patient prepared for dive [Patient descended without problem for 9 minutes] : Patient descended without problem for 9 minutes [No dizziness or thirst] :  No dizziness or thirst [No ear problems] : No ear problems [Vital signs stable] : Vital signs stable [Tolerating dive well] : Tolerating dive well [No Chest Pain, shortness of breath] : No Chest Pain, shortness of breath [Respiratory Rate Stable] : Respiratory Rate Stable [No chest pain, shortness of breath, or ear pain] :  No chest pain, shortness of breath, or ear pain  [Tolerated Ascent well] : Tolerated Ascent well [Vital Signs stable] : Vital Signs stable [No] : No [A physician was present throughout the entire HBOT] : A physician was present throughout the entire HBOT [Clinically Stable] : Clinically stable [Continue Treatment Plan] : Continue treatment plan [FreeTextEntry2] : none

## 2021-04-26 NOTE — PROCEDURE
[Outpatient] : Outpatient [Ambulatory] : Patient is ambulatory. [THIS CHAMBER HAS BEEN CLEANED / DISINFECTED] : This chamber has been cleaned / disinfected according to local and hospital policy and procedure prior to this treatment. [Patient demonstrated and verbalized proper technique for using air break mask] : Patient demonstrated and verbalized proper technique for using air break mask [Patient educated on the risks of SMOKING prior to HBOT with understanding] : Patient educated on the risks of SMOKING prior to HBOT with understanding [Patient educated on the risks of CONSUMING ALCOHOL prior to HBOT with understanding] : Patient educated on the risks of CONSUMING ALCOHOL prior to HBOT with understanding [100% Cotton] : 100% cotton [Empty all pockets] : empty all pockets [No hair oils, wigs, hairpieces, pins] : no hair oils, wigs, hairpieces, pins  [Pre tx medications] : pre tx medications  [No make-up, creams] : no make-up, creams  [No jewelry] : no jewelry  [No matches, cigarettes, lighters] : no matches, cigarettes, lighters  [Hearing aid removed] : hearing aid removed [Dentures removed] : dentures removed [Ground bracelet on pt's wrist] : ground bracelet on pt's wrist  [Contacts removed] : contacts removed  [Remove nail polish] : remove nail polish  [No reading material] : no reading material  [Bra, undergarments removed] : bra, undergarments removed  [No contraindicated dressings] : no contraindicated dressings [Ground Wire - VISUAL Verification - Intact/Free of Obstruction] : Ground Wire - VISUAL Verification - Intact/Free of Obstruction  [Ground Continuity - Verified < 1 ohm w/ Wrist Strap Gatito] : Ground Continuity - Verified < 1 ohm w/ Wrist Strap Gatito [Number: ___] : Number: [unfilled] [Diagnosis: ___] : Diagnosis: [unfilled] [____] : Post-Dive: Time - [unfilled] [___] : Post-Dive: Value - [unfilled] mg/dL [Clear all fields] : clear all fields [] : No [FreeTextEntry4] : 100 [FreeTextEntry6] : 2177 [FreeTextEntry8] : 6762 [de-identified] : 6812 [de-identified] : 3000 [de-identified] : 4880 [de-identified] : 1352 [de-identified] : 1817 [de-identified] : 1820 [de-identified] : 120 minutes

## 2021-04-26 NOTE — PROCEDURE
[Outpatient] : Outpatient [Ambulatory] : Patient is ambulatory. [THIS CHAMBER HAS BEEN CLEANED / DISINFECTED] : This chamber has been cleaned / disinfected according to local and hospital policy and procedure prior to this treatment. [Patient demonstrated and verbalized proper technique for using air break mask] : Patient demonstrated and verbalized proper technique for using air break mask [Patient educated on the risks of SMOKING prior to HBOT with understanding] : Patient educated on the risks of SMOKING prior to HBOT with understanding [Patient educated on the risks of CONSUMING ALCOHOL prior to HBOT with understanding] : Patient educated on the risks of CONSUMING ALCOHOL prior to HBOT with understanding [100% Cotton] : 100% cotton [Empty all pockets] : empty all pockets [No hair oils, wigs, hairpieces, pins] : no hair oils, wigs, hairpieces, pins  [Pre tx medications] : pre tx medications  [No make-up, creams] : no make-up, creams  [No jewelry] : no jewelry  [No matches, cigarettes, lighters] : no matches, cigarettes, lighters  [Hearing aid removed] : hearing aid removed [Dentures removed] : dentures removed [Ground bracelet on pt's wrist] : ground bracelet on pt's wrist  [Contacts removed] : contacts removed  [Remove nail polish] : remove nail polish  [No reading material] : no reading material  [No contraindicated dressings] : no contraindicated dressings [Bra, undergarments removed] : bra, undergarments removed  [Ground Wire - VISUAL Verification - Intact/Free of Obstruction] : Ground Wire - VISUAL Verification - Intact/Free of Obstruction  [Ground Continuity - Verified < 1 ohm w/ Wrist Strap Gatito] : Ground Continuity - Verified < 1 ohm w/ Wrist Strap Gatito [Number: ___] : Number: [unfilled] [Diagnosis: ___] : Diagnosis: [unfilled] [____] : Post-Dive: Time - [unfilled] [___] : Post-Dive: Value - [unfilled] mg/dL [Clear all fields] : clear all fields [] : No [FreeTextEntry4] : 100  [FreeTextEntry6] : 7439 [FreeTextEntry8] : 6334 [de-identified] : 1661 [de-identified] : 4631 [de-identified] : 0911 [de-identified] : 5001 [de-identified] : 1800 [de-identified] : 3502 [de-identified] : 120 minutes

## 2021-04-27 ENCOUNTER — APPOINTMENT (OUTPATIENT)
Dept: HYPERBARIC MEDICINE | Facility: HOSPITAL | Age: 56
End: 2021-04-27
Payer: MEDICARE

## 2021-04-27 ENCOUNTER — OUTPATIENT (OUTPATIENT)
Dept: OUTPATIENT SERVICES | Facility: HOSPITAL | Age: 56
LOS: 1 days | Discharge: HOME | End: 2021-04-27
Payer: MEDICARE

## 2021-04-27 VITALS
TEMPERATURE: 98 F | HEART RATE: 67 BPM | OXYGEN SATURATION: 97 % | SYSTOLIC BLOOD PRESSURE: 157 MMHG | DIASTOLIC BLOOD PRESSURE: 76 MMHG | RESPIRATION RATE: 18 BRPM

## 2021-04-27 VITALS
HEART RATE: 56 BPM | DIASTOLIC BLOOD PRESSURE: 72 MMHG | TEMPERATURE: 97.6 F | SYSTOLIC BLOOD PRESSURE: 144 MMHG | RESPIRATION RATE: 18 BRPM | OXYGEN SATURATION: 100 %

## 2021-04-27 DIAGNOSIS — T86.828 OTHER COMPLICATIONS OF SKIN GRAFT (ALLOGRAFT) (AUTOGRAFT): ICD-10-CM

## 2021-04-27 DIAGNOSIS — Z89.421 ACQUIRED ABSENCE OF OTHER RIGHT TOE(S): Chronic | ICD-10-CM

## 2021-04-27 DIAGNOSIS — E11.621 TYPE 2 DIABETES MELLITUS WITH FOOT ULCER: ICD-10-CM

## 2021-04-27 DIAGNOSIS — N18.6 END STAGE RENAL DISEASE: Chronic | ICD-10-CM
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
## 2021-04-27 DIAGNOSIS — L97.512 NON-PRESSURE CHRONIC ULCER OF OTHER PART OF RIGHT FOOT WITH FAT LAYER EXPOSED: ICD-10-CM

## 2021-04-27 DIAGNOSIS — Z94.0 KIDNEY TRANSPLANT STATUS: Chronic | ICD-10-CM

## 2021-04-27 DIAGNOSIS — Z79.4 LONG TERM (CURRENT) USE OF INSULIN: ICD-10-CM

## 2021-04-27 PROCEDURE — 99183 HYPERBARIC OXYGEN THERAPY: CPT

## 2021-04-27 PROCEDURE — G0277: CPT

## 2021-04-27 PROCEDURE — 82962 GLUCOSE BLOOD TEST: CPT

## 2021-04-27 NOTE — PLAN
[FreeTextEntry1] : Right 3rd toe-Dd\par right hallux-ag alginate, Dd\par Covid 19 PCR test done\par Resume HBO tx if test is neg.

## 2021-04-27 NOTE — ADDENDUM
[FreeTextEntry1] : PT DESCENDED TO 2.4 DEEPAK @ 2.2 PSI/MIN WITHOUT INCIDENT IN CHAMBER # 4. PT'S RESTING AT TX DEPTH WITH CHEST RISE AND FALL OBSERVED CHAMBER SIDE.\par PT TOLERATED AIR BREAKS WELL. \par PT ASCENDED FROM TX DEPTH WITHOUT INCIDENT. PT TOLERATED TX WELL.

## 2021-04-27 NOTE — ASSESSMENT
[Verbal] : Verbal [Patient] : Patient [Good - alert, interested, motivated] : Good - alert, interested, motivated [Verbalizes knowledge/Understanding] : Verbalizes knowledge/understanding [Dressing changes] : dressing changes [Skin Care] : skin care [Foot Care] : foot care [Pressure relief] : pressure relief [Signs and symptoms of infection] : sign and symptoms of infection [How and When to Call] : how and when to call [Hyperbaric Therapy] : hyperbaric therapy [Off-loading] : off-loading [Patient responsibility to plan of care] : patient responsibility to plan of care [Stable] : stable [Home] : Home [Ambulatory] : Ambulatory [Not Applicable - Long Term Care/Home Health Agency] : Long Term Care/Home Health Agency: Not Applicable [] : No [FreeTextEntry2] : Restore Skin Integrity\par Infection Control\par Localized wound care\par Maintain acceptable pain levels at satisfactory relief.\par Demonstrates use of both nonpharmacological and pharmacological pain relief strategies [FreeTextEntry4] : HBOT 21/30\par no further HBOt needed at this time complete 30/30\par COVID 19 PCR Swab Obtained and sent to lab per MD orders\par F/U to [facility] Daily HBOT

## 2021-04-27 NOTE — HISTORY OF PRESENT ILLNESS
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 Pt recently contracted Covid 19 2 wks ago and has been withheld  from further tx. Pt here now for an assessment and for Covid 19 PCR testing to resume HBO tx if neg. No c/o.

## 2021-04-27 NOTE — PHYSICAL EXAM
[Normal Thyroid] : the thyroid was normal [Normal Breath Sounds] : Normal breath sounds [Normal Heart Sounds] : normal heart sounds [Normal Rate and Rhythm] : normal rate and rhythm [Oriented to Person] : oriented to person [Oriented to Place] : oriented to place [Oriented to Time] : oriented to time [Calm] : calm [JVD] : no jugular venous distention  [Varicose Veins Of Lower Extremities] : not present [] : not present [Abdomen Masses] : No abdominal massess [Abdomen Tenderness] : ~T ~M No abdominal tenderness [Tender] : nontender [Enlarged] : not enlarged [de-identified] : adult WM, NAD, alert, Ox 3. [FreeTextEntry1] : Right 3rd Distal Digit Amp Site- Dry Eschar [de-identified] : No Product [de-identified] : Cleansed with Normal Saline\par  [FreeTextEntry7] : Right Plantar Hallux [FreeTextEntry8] : 0.2 [FreeTextEntry9] : 0.3 [de-identified] : 0.1 [de-identified] : Callus [de-identified] : Silver Alginate [de-identified] : Cleansed with Normal Saline\par  [TWNoteComboBox4] : None [TWNoteComboBox5] : No [de-identified] : No [de-identified] : None [de-identified] : No [de-identified] : No [de-identified] : None [de-identified] : None [de-identified] : 100% [de-identified] : No [de-identified] : 3x Weekly

## 2021-04-27 NOTE — PROCEDURE
[Outpatient] : Outpatient [Ambulatory] : Patient is ambulatory. [THIS CHAMBER HAS BEEN CLEANED / DISINFECTED] : This chamber has been cleaned / disinfected according to local and hospital policy and procedure prior to this treatment. [Patient demonstrated and verbalized proper technique for using air break mask] : Patient demonstrated and verbalized proper technique for using air break mask [Patient educated on the risks of SMOKING prior to HBOT with understanding] : Patient educated on the risks of SMOKING prior to HBOT with understanding [Patient educated on the risks of CONSUMING ALCOHOL prior to HBOT with understanding] : Patient educated on the risks of CONSUMING ALCOHOL prior to HBOT with understanding [100% Cotton] : 100% cotton [Empty all pockets] : empty all pockets [No hair oils, wigs, hairpieces, pins] : no hair oils, wigs, hairpieces, pins  [Pre tx medications] : pre tx medications  [No make-up, creams] : no make-up, creams  [No jewelry] : no jewelry  [No matches, cigarettes, lighters] : no matches, cigarettes, lighters  [Hearing aid removed] : hearing aid removed [Dentures removed] : dentures removed [Ground bracelet on pt's wrist] : ground bracelet on pt's wrist  [Contacts removed] : contacts removed  [Remove nail polish] : remove nail polish  [No reading material] : no reading material  [Bra, undergarments removed] : bra, undergarments removed  [No contraindicated dressings] : no contraindicated dressings [Ground Wire - VISUAL Verification - Intact/Free of Obstruction] : Ground Wire - VISUAL Verification - Intact/Free of Obstruction  [Ground Continuity - Verified < 1 ohm w/ Wrist Strap Gatito] : Ground Continuity - Verified < 1 ohm w/ Wrist Strap Gatito [Number: ___] : Number: [unfilled] [Diagnosis: ___] : Diagnosis: [unfilled] [____] : Post-Dive: Time - [unfilled] [___] : Post-Dive: Value - [unfilled] mg/dL [Clear all fields] : clear all fields [] : No [FreeTextEntry4] : 100 [FreeTextEntry6] : 4499 [FreeTextEntry8] : 7395 [de-identified] : 3863 [de-identified] : 4684 [de-identified] : 0951 [de-identified] : 1503 [de-identified] : 1006 [de-identified] : 6375 [de-identified] : 120 MIN

## 2021-04-28 ENCOUNTER — NON-APPOINTMENT (OUTPATIENT)
Age: 56
End: 2021-04-28

## 2021-04-28 ENCOUNTER — OUTPATIENT (OUTPATIENT)
Dept: OUTPATIENT SERVICES | Facility: HOSPITAL | Age: 56
LOS: 1 days | Discharge: HOME | End: 2021-04-28
Payer: MEDICARE

## 2021-04-28 ENCOUNTER — APPOINTMENT (OUTPATIENT)
Dept: HYPERBARIC MEDICINE | Facility: HOSPITAL | Age: 56
End: 2021-04-28
Payer: MEDICARE

## 2021-04-28 VITALS
TEMPERATURE: 97.7 F | RESPIRATION RATE: 18 BRPM | HEART RATE: 73 BPM | OXYGEN SATURATION: 95 % | DIASTOLIC BLOOD PRESSURE: 76 MMHG | SYSTOLIC BLOOD PRESSURE: 166 MMHG

## 2021-04-28 VITALS
SYSTOLIC BLOOD PRESSURE: 181 MMHG | DIASTOLIC BLOOD PRESSURE: 83 MMHG | OXYGEN SATURATION: 98 % | RESPIRATION RATE: 18 BRPM | TEMPERATURE: 97.9 F | HEART RATE: 68 BPM

## 2021-04-28 DIAGNOSIS — Z94.0 KIDNEY TRANSPLANT STATUS: Chronic | ICD-10-CM

## 2021-04-28 DIAGNOSIS — N18.6 END STAGE RENAL DISEASE: Chronic | ICD-10-CM

## 2021-04-28 DIAGNOSIS — T86.828 OTHER COMPLICATIONS OF SKIN GRAFT (ALLOGRAFT) (AUTOGRAFT): ICD-10-CM

## 2021-04-28 DIAGNOSIS — Z89.421 ACQUIRED ABSENCE OF OTHER RIGHT TOE(S): Chronic | ICD-10-CM

## 2021-04-28 PROCEDURE — 87635 SARS-COV-2 COVID-19 AMP PRB: CPT

## 2021-04-28 PROCEDURE — 99183 HYPERBARIC OXYGEN THERAPY: CPT

## 2021-04-28 PROCEDURE — 82962 GLUCOSE BLOOD TEST: CPT

## 2021-04-28 PROCEDURE — G0277: CPT

## 2021-04-28 NOTE — PROCEDURE
[Outpatient] : Outpatient [Ambulatory] : Patient is ambulatory. [THIS CHAMBER HAS BEEN CLEANED / DISINFECTED] : This chamber has been cleaned / disinfected according to local and hospital policy and procedure prior to this treatment. [Patient demonstrated and verbalized proper technique for using air break mask] : Patient demonstrated and verbalized proper technique for using air break mask [Patient educated on the risks of SMOKING prior to HBOT with understanding] : Patient educated on the risks of SMOKING prior to HBOT with understanding [Patient educated on the risks of CONSUMING ALCOHOL prior to HBOT with understanding] : Patient educated on the risks of CONSUMING ALCOHOL prior to HBOT with understanding [100% Cotton] : 100% cotton [Empty all pockets] : empty all pockets [No hair oils, wigs, hairpieces, pins] : no hair oils, wigs, hairpieces, pins  [Pre tx medications] : pre tx medications  [No make-up, creams] : no make-up, creams  [No jewelry] : no jewelry  [No matches, cigarettes, lighters] : no matches, cigarettes, lighters  [Hearing aid removed] : hearing aid removed [Dentures removed] : dentures removed [Ground bracelet on pt's wrist] : ground bracelet on pt's wrist  [Contacts removed] : contacts removed  [Remove nail polish] : remove nail polish  [No reading material] : no reading material  [Bra, undergarments removed] : bra, undergarments removed  [No contraindicated dressings] : no contraindicated dressings [Ground Wire - VISUAL Verification - Intact/Free of Obstruction] : Ground Wire - VISUAL Verification - Intact/Free of Obstruction  [Ground Continuity - Verified < 1 ohm w/ Wrist Strap Gatito] : Ground Continuity - Verified < 1 ohm w/ Wrist Strap Gatito [Number: ___] : Number: [unfilled] [Diagnosis: ___] : Diagnosis: [unfilled] [____] : Post-Dive: Time - [unfilled] [___] : Post-Dive: Value - [unfilled] mg/dL [Clear all fields] : clear all fields [] : No [FreeTextEntry4] : 100 [FreeTextEntry6] : 6419 [FreeTextEntry8] : 3396 [de-identified] : 3169 [de-identified] : 9525 [de-identified] : 1787 [de-identified] : 5571 [de-identified] : 1814 [de-identified] : 3709 [de-identified] : 120 mins

## 2021-04-28 NOTE — ASSESSMENT
[Patient descended without problem for 9 minutes] : Patient descended without problem for 9 minutes [No change from previous assessment] : No change from previous assessment [No dizziness or thirst] :  No dizziness or thirst [No ear problems] : No ear problems [Vital signs stable] : Vital signs stable [Tolerating dive well] : Tolerating dive well [No Chest Pain, shortness of breath] : No Chest Pain, shortness of breath [Respiratory Rate Stable] : Respiratory Rate Stable [No chest pain, shortness of breath, or ear pain] :  No chest pain, shortness of breath, or ear pain  [Tolerated Ascent well] : Tolerated Ascent well [Vital Signs stable] : Vital Signs stable [A physician was present throughout the entire HBOT] : A physician was present throughout the entire HBOT [No] : No [Clinically Stable] : Clinically stable [0] : 0 out of 10 [Continue Treatment Plan] : Continue treatment plan

## 2021-04-28 NOTE — ADDENDUM
[FreeTextEntry1] : PT RECEIVED OFF LOADING MEASURES PRE HBOT \par PT RECEIVED COVID-19 PCR SWAB PRE HBOT \par PTS INSULIN PORT WAS COVERED WITH ABD PAD AND PAPER TAPE PRE HBOT \par PT DESCENDED TO 2.4 DEEPAK @ 2.2 PSI/MIN WITHOUT INCIDENT IN CHAMBER #2\par PT RESTING AT TX DEPTH WITH VISIBLE CHEST RISE AND FALL OBSERVED CHAMBERSIDE \par PT ASCENDED FROM TX DEPTH TO SURFACE WITHOUT INCIDENT IN CHAMBER #2.\par PT TOLERATED HBOT WITHOUT INCIDENT.\par PT RECEIVED DRESSING CHANGE POST-HBOT VIA LPN.

## 2021-04-29 ENCOUNTER — APPOINTMENT (OUTPATIENT)
Dept: HYPERBARIC MEDICINE | Facility: HOSPITAL | Age: 56
End: 2021-04-29
Payer: MEDICARE

## 2021-04-29 ENCOUNTER — OUTPATIENT (OUTPATIENT)
Dept: OUTPATIENT SERVICES | Facility: HOSPITAL | Age: 56
LOS: 1 days | Discharge: HOME | End: 2021-04-29
Payer: MEDICARE

## 2021-04-29 VITALS
RESPIRATION RATE: 18 BRPM | DIASTOLIC BLOOD PRESSURE: 72 MMHG | OXYGEN SATURATION: 96 % | HEART RATE: 65 BPM | TEMPERATURE: 98.6 F | SYSTOLIC BLOOD PRESSURE: 136 MMHG

## 2021-04-29 VITALS
SYSTOLIC BLOOD PRESSURE: 181 MMHG | DIASTOLIC BLOOD PRESSURE: 80 MMHG | RESPIRATION RATE: 18 BRPM | HEART RATE: 61 BPM | TEMPERATURE: 97.7 F | OXYGEN SATURATION: 100 %

## 2021-04-29 DIAGNOSIS — Z94.0 KIDNEY TRANSPLANT STATUS: Chronic | ICD-10-CM

## 2021-04-29 DIAGNOSIS — Z79.4 LONG TERM (CURRENT) USE OF INSULIN: ICD-10-CM
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
## 2021-04-29 DIAGNOSIS — L97.512 NON-PRESSURE CHRONIC ULCER OF OTHER PART OF RIGHT FOOT WITH FAT LAYER EXPOSED: ICD-10-CM

## 2021-04-29 DIAGNOSIS — N18.6 END STAGE RENAL DISEASE: Chronic | ICD-10-CM

## 2021-04-29 DIAGNOSIS — U07.1 COVID-19: ICD-10-CM

## 2021-04-29 DIAGNOSIS — T86.828 OTHER COMPLICATIONS OF SKIN GRAFT (ALLOGRAFT) (AUTOGRAFT): ICD-10-CM

## 2021-04-29 DIAGNOSIS — Z89.421 ACQUIRED ABSENCE OF OTHER RIGHT TOE(S): Chronic | ICD-10-CM

## 2021-04-29 DIAGNOSIS — E11.621 TYPE 2 DIABETES MELLITUS WITH FOOT ULCER: ICD-10-CM

## 2021-04-29 LAB — SARS-COV-2 RNA SPEC QL NAA+PROBE: DETECTED

## 2021-04-29 PROCEDURE — 82962 GLUCOSE BLOOD TEST: CPT

## 2021-04-29 PROCEDURE — G0277: CPT

## 2021-04-29 PROCEDURE — 99183 HYPERBARIC OXYGEN THERAPY: CPT

## 2021-04-29 NOTE — PROCEDURE
[Outpatient] : Outpatient [Ambulatory] : Patient is ambulatory. [THIS CHAMBER HAS BEEN CLEANED / DISINFECTED] : This chamber has been cleaned / disinfected according to local and hospital policy and procedure prior to this treatment. [Patient demonstrated and verbalized proper technique for using air break mask] : Patient demonstrated and verbalized proper technique for using air break mask [Patient educated on the risks of SMOKING prior to HBOT with understanding] : Patient educated on the risks of SMOKING prior to HBOT with understanding [Patient educated on the risks of CONSUMING ALCOHOL prior to HBOT with understanding] : Patient educated on the risks of CONSUMING ALCOHOL prior to HBOT with understanding [100% Cotton] : 100% cotton [Empty all pockets] : empty all pockets [No hair oils, wigs, hairpieces, pins] : no hair oils, wigs, hairpieces, pins  [Pre tx medications] : pre tx medications  [No make-up, creams] : no make-up, creams  [No jewelry] : no jewelry  [No matches, cigarettes, lighters] : no matches, cigarettes, lighters  [Hearing aid removed] : hearing aid removed [Dentures removed] : dentures removed [Ground bracelet on pt's wrist] : ground bracelet on pt's wrist  [Contacts removed] : contacts removed  [Remove nail polish] : remove nail polish  [No reading material] : no reading material  [Bra, undergarments removed] : bra, undergarments removed  [No contraindicated dressings] : no contraindicated dressings [Ground Wire - VISUAL Verification - Intact/Free of Obstruction] : Ground Wire - VISUAL Verification - Intact/Free of Obstruction  [Ground Continuity - Verified < 1 ohm w/ Wrist Strap Gatito] : Ground Continuity - Verified < 1 ohm w/ Wrist Strap Gatito [Number: ___] : Number: [unfilled] [Diagnosis: ___] : Diagnosis: [unfilled] [____] : Post-Dive: Time - [unfilled] [___] : Post-Dive: Value - [unfilled] mg/dL [Clear all fields] : clear all fields [] : No [FreeTextEntry4] : 100 [FreeTextEntry6] : 9901 [de-identified] : 4354 [[facility] Cent[facility]Entry[facility]] : 6867 [de-identified] : 4880 [de-identified] : 6330 [de-identified] : 4582 [de-identified] : 1813 [de-identified] : 1823 [de-identified] : 120 minutes

## 2021-04-29 NOTE — ADDENDUM
[FreeTextEntry1] : PTS INSULIN PORT WAS COVERED WITH ABD PAD AND PAPER TAPE PRE HBOT \par PT DESCENDED TO 2.4 DEEPAK @ 2.2 PSI/MIN WITHOUT INCIDENT IN CHAMBER #3\par PT RESTING AT TX DEPTH WITH VISIBLE CHEST RISE AND FALL OBSERVED CHAMBERSIDE \par PT TOLERATED BOTH AIR BREAKS WELL \par PT ASCENDED FROM TX DEPTH WITHOUT INCIDENT IN CHAMBER #3\par PT TOLERATED TX WELL

## 2021-04-30 ENCOUNTER — APPOINTMENT (OUTPATIENT)
Dept: HYPERBARIC MEDICINE | Facility: HOSPITAL | Age: 56
End: 2021-04-30
Payer: MEDICARE

## 2021-04-30 ENCOUNTER — OUTPATIENT (OUTPATIENT)
Dept: OUTPATIENT SERVICES | Facility: HOSPITAL | Age: 56
LOS: 1 days | Discharge: HOME | End: 2021-04-30
Payer: MEDICARE

## 2021-04-30 VITALS
OXYGEN SATURATION: 97 % | SYSTOLIC BLOOD PRESSURE: 179 MMHG | HEART RATE: 54 BPM | TEMPERATURE: 97.4 F | RESPIRATION RATE: 18 BRPM | DIASTOLIC BLOOD PRESSURE: 82 MMHG

## 2021-04-30 VITALS
DIASTOLIC BLOOD PRESSURE: 74 MMHG | TEMPERATURE: 97.8 F | SYSTOLIC BLOOD PRESSURE: 157 MMHG | RESPIRATION RATE: 18 BRPM | HEART RATE: 59 BPM | OXYGEN SATURATION: 94 %

## 2021-04-30 DIAGNOSIS — T86.828 OTHER COMPLICATIONS OF SKIN GRAFT (ALLOGRAFT) (AUTOGRAFT): ICD-10-CM

## 2021-04-30 DIAGNOSIS — I96 OTHER COMPLICATIONS OF SKIN GRAFT (ALLOGRAFT) (AUTOGRAFT): ICD-10-CM

## 2021-04-30 DIAGNOSIS — N18.6 END STAGE RENAL DISEASE: Chronic | ICD-10-CM

## 2021-04-30 DIAGNOSIS — L97.512 NON-PRESSURE CHRONIC ULCER OF OTHER PART OF RIGHT FOOT WITH FAT LAYER EXPOSED: ICD-10-CM
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
## 2021-04-30 DIAGNOSIS — Z79.4 LONG TERM (CURRENT) USE OF INSULIN: ICD-10-CM

## 2021-04-30 DIAGNOSIS — Z94.0 KIDNEY TRANSPLANT STATUS: Chronic | ICD-10-CM

## 2021-04-30 DIAGNOSIS — E11.621 TYPE 2 DIABETES MELLITUS WITH FOOT ULCER: ICD-10-CM

## 2021-04-30 DIAGNOSIS — Z89.421 ACQUIRED ABSENCE OF OTHER RIGHT TOE(S): Chronic | ICD-10-CM

## 2021-04-30 PROCEDURE — 99183 HYPERBARIC OXYGEN THERAPY: CPT

## 2021-04-30 PROCEDURE — G0277: CPT

## 2021-04-30 PROCEDURE — 82962 GLUCOSE BLOOD TEST: CPT

## 2021-04-30 NOTE — ADDENDUM
[FreeTextEntry1] : PTS INSULIN PORT WAS COVERED WITH ABD PAD & PAPER TAPE PRE HBOT\par PT DESCENDED TO 2.4 DEEPAK @ 2.2 PSI/MIN WITHOUT INCIDENT IN CHAMBER #2\par PT RESTING AT TX DEPTH WITH VISIBLE CHEST RISE AND FALL OBSERVED CHAMBERSIDE \par PT TOLERATED BOTH AIR BREAKS WELL\par PT ASCENDED FROM TX DEPTH WITHOUT INCIDENT IN CHAMBER #2\par PT TOLERATED TX WELL

## 2021-04-30 NOTE — PROCEDURE
[Outpatient] : Outpatient [Ambulatory] : Patient is ambulatory. [THIS CHAMBER HAS BEEN CLEANED / DISINFECTED] : This chamber has been cleaned / disinfected according to local and hospital policy and procedure prior to this treatment. [Patient demonstrated and verbalized proper technique for using air break mask] : Patient demonstrated and verbalized proper technique for using air break mask [Patient educated on the risks of SMOKING prior to HBOT with understanding] : Patient educated on the risks of SMOKING prior to HBOT with understanding [Patient educated on the risks of CONSUMING ALCOHOL prior to HBOT with understanding] : Patient educated on the risks of CONSUMING ALCOHOL prior to HBOT with understanding [100% Cotton] : 100% cotton [Empty all pockets] : empty all pockets [No hair oils, wigs, hairpieces, pins] : no hair oils, wigs, hairpieces, pins  [Pre tx medications] : pre tx medications  [No make-up, creams] : no make-up, creams  [No jewelry] : no jewelry  [No matches, cigarettes, lighters] : no matches, cigarettes, lighters  [Hearing aid removed] : hearing aid removed [Dentures removed] : dentures removed [Ground bracelet on pt's wrist] : ground bracelet on pt's wrist  [Contacts removed] : contacts removed  [Remove nail polish] : remove nail polish  [No reading material] : no reading material  [Bra, undergarments removed] : bra, undergarments removed  [No contraindicated dressings] : no contraindicated dressings [Ground Wire - VISUAL Verification - Intact/Free of Obstruction] : Ground Wire - VISUAL Verification - Intact/Free of Obstruction  [Ground Continuity - Verified < 1 ohm w/ Wrist Strap Gatito] : Ground Continuity - Verified < 1 ohm w/ Wrist Strap Gatito [Number: ___] : Number: [unfilled] [Diagnosis: ___] : Diagnosis: [unfilled] [____] : Post-Dive: Time - [unfilled] [___] : Post-Dive: Value - [unfilled] mg/dL [Clear all fields] : clear all fields [] : No [FreeTextEntry4] : 100 [FreeTextEntry6] : 0185 [FreeTextEntry8] : 9743 [de-identified] : 6775 [de-identified] : 1707 [de-identified] : 2469 [de-identified] : 0491 [de-identified] : 1812 [de-identified] : 1827 [de-identified] : 120 minutes

## 2021-05-03 ENCOUNTER — APPOINTMENT (OUTPATIENT)
Dept: HYPERBARIC MEDICINE | Facility: HOSPITAL | Age: 56
End: 2021-05-03

## 2021-05-04 ENCOUNTER — APPOINTMENT (OUTPATIENT)
Dept: HYPERBARIC MEDICINE | Facility: HOSPITAL | Age: 56
End: 2021-05-04

## 2021-05-05 ENCOUNTER — APPOINTMENT (OUTPATIENT)
Dept: HYPERBARIC MEDICINE | Facility: HOSPITAL | Age: 56
End: 2021-05-05

## 2021-05-06 ENCOUNTER — APPOINTMENT (OUTPATIENT)
Dept: HYPERBARIC MEDICINE | Facility: HOSPITAL | Age: 56
End: 2021-05-06

## 2021-05-07 ENCOUNTER — APPOINTMENT (OUTPATIENT)
Dept: HYPERBARIC MEDICINE | Facility: HOSPITAL | Age: 56
End: 2021-05-07
Payer: MEDICARE

## 2021-05-07 ENCOUNTER — APPOINTMENT (OUTPATIENT)
Dept: PODIATRY | Facility: HOSPITAL | Age: 56
End: 2021-05-07
Payer: MEDICARE

## 2021-05-07 ENCOUNTER — OUTPATIENT (OUTPATIENT)
Dept: OUTPATIENT SERVICES | Facility: HOSPITAL | Age: 56
LOS: 1 days | Discharge: HOME | End: 2021-05-07
Payer: MEDICARE

## 2021-05-07 VITALS
DIASTOLIC BLOOD PRESSURE: 79 MMHG | BODY MASS INDEX: 41.08 KG/M2 | WEIGHT: 310 LBS | OXYGEN SATURATION: 98 % | RESPIRATION RATE: 20 BRPM | TEMPERATURE: 97.6 F | HEIGHT: 73 IN | HEART RATE: 60 BPM | SYSTOLIC BLOOD PRESSURE: 166 MMHG

## 2021-05-07 VITALS
HEART RATE: 60 BPM | SYSTOLIC BLOOD PRESSURE: 166 MMHG | HEIGHT: 73 IN | DIASTOLIC BLOOD PRESSURE: 79 MMHG | RESPIRATION RATE: 20 BRPM | WEIGHT: 310 LBS | TEMPERATURE: 97.6 F | OXYGEN SATURATION: 98 % | BODY MASS INDEX: 41.08 KG/M2

## 2021-05-07 DIAGNOSIS — T86.828 OTHER COMPLICATIONS OF SKIN GRAFT (ALLOGRAFT) (AUTOGRAFT): ICD-10-CM
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
## 2021-05-07 DIAGNOSIS — Z89.421 ACQUIRED ABSENCE OF OTHER RIGHT TOE(S): Chronic | ICD-10-CM

## 2021-05-07 DIAGNOSIS — E11.621 TYPE 2 DIABETES MELLITUS WITH FOOT ULCER: ICD-10-CM

## 2021-05-07 DIAGNOSIS — Z79.4 LONG TERM (CURRENT) USE OF INSULIN: ICD-10-CM

## 2021-05-07 DIAGNOSIS — L97.512 NON-PRESSURE CHRONIC ULCER OF OTHER PART OF RIGHT FOOT WITH FAT LAYER EXPOSED: ICD-10-CM

## 2021-05-07 DIAGNOSIS — Z94.0 KIDNEY TRANSPLANT STATUS: Chronic | ICD-10-CM

## 2021-05-07 DIAGNOSIS — N18.6 END STAGE RENAL DISEASE: Chronic | ICD-10-CM

## 2021-05-07 PROCEDURE — G0463: CPT

## 2021-05-07 PROCEDURE — 99213 OFFICE O/P EST LOW 20 MIN: CPT

## 2021-05-07 NOTE — REVIEW OF SYSTEMS
[Fever] : no fever [Chills] : no chills [Eye Pain] : no eye pain [Loss Of Hearing] : no hearing loss [Shortness Of Breath] : no shortness of breath [Abdominal Pain] : no abdominal pain [Vomiting] : no vomiting [Skin Lesions] : no skin lesions [Skin Wound] : no skin wound [Anxiety] : no anxiety [Easy Bleeding] : no tendency for easy bleeding [FreeTextEntry5] : HTN,HLD [de-identified] : currently no open ulcers  [de-identified] : IDDM with neuropathy  [de-identified] : MARILUZ

## 2021-05-07 NOTE — ASSESSMENT
[Verbal] : Verbal [Patient] : Patient [Good - alert, interested, motivated] : Good - alert, interested, motivated [Verbalizes knowledge/Understanding] : Verbalizes knowledge/understanding [Dressing changes] : dressing changes [Foot Care] : foot care [Skin Care] : skin care [Pressure relief] : pressure relief [Signs and symptoms of infection] : sign and symptoms of infection [How and When to Call] : how and when to call [Hyperbaric Therapy] : hyperbaric therapy [Off-loading] : off-loading [Patient responsibility to plan of care] : patient responsibility to plan of care [Stable] : stable [Home] : Home [Ambulatory] : Ambulatory [Not Applicable - Long Term Care/Home Health Agency] : Long Term Care/Home Health Agency: Not Applicable [] : Yes [FreeTextEntry2] : Maintain optimal skin integrity [FreeTextEntry4] : Pt to f/u in 1 month

## 2021-05-07 NOTE — PLAN
[FreeTextEntry1] : continue off loading and observation Spent 20 minutes for patient care and medical decision making.\par

## 2021-05-07 NOTE — PHYSICAL EXAM
[JVD] : no jugular venous distention  [Normal Thyroid] : the thyroid was normal [Normal Breath Sounds] : Normal breath sounds [Normal Heart Sounds] : normal heart sounds [Normal Rate and Rhythm] : normal rate and rhythm [0] : left 0 [1+] : left 1+ [Varicose Veins Of Lower Extremities] : not present [] : not present [Abdomen Masses] : No abdominal massess [Abdomen Tenderness] : ~T ~M No abdominal tenderness [Tender] : nontender [Enlarged] : not enlarged [Purpura] : no purpura  [Petechiae] : no petechiae [Skin Ulcer] : no ulcer [Skin Induration] : no induration [Oriented to Person] : oriented to person [Oriented to Place] : oriented to place [Oriented to Time] : oriented to time [Calm] : calm [de-identified] : adult WM, NAD, alert, Ox 3. [de-identified] : no open ulcers  [FreeTextEntry1] : Right 3rd Distal Digit Amp Site- Dry Eschar [de-identified] : No Product [de-identified] : Cleansed with Normal Saline\par  [de-identified] : Callus shaved by KYLE [FreeTextEntry7] : Right Plantar Hallux - callus [de-identified] : Callus [de-identified] : Cleansed with Normal Saline\par  [TWNoteComboBox4] : None [TWNoteComboBox5] : No [de-identified] : No [de-identified] : None [de-identified] : No [de-identified] : No [de-identified] : None [de-identified] : None [de-identified] : 100% [de-identified] : No [de-identified] : 3x Weekly

## 2021-05-08 DIAGNOSIS — Z86.73 PERSONAL HISTORY OF TRANSIENT ISCHEMIC ATTACK (TIA), AND CEREBRAL INFARCTION WITHOUT RESIDUAL DEFICITS: ICD-10-CM
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
## 2021-05-08 DIAGNOSIS — T86.828 OTHER COMPLICATIONS OF SKIN GRAFT (ALLOGRAFT) (AUTOGRAFT): ICD-10-CM

## 2021-05-08 DIAGNOSIS — Z89.422 ACQUIRED ABSENCE OF OTHER LEFT TOE(S): ICD-10-CM

## 2021-05-08 DIAGNOSIS — Z86.718 PERSONAL HISTORY OF OTHER VENOUS THROMBOSIS AND EMBOLISM: ICD-10-CM

## 2021-05-08 DIAGNOSIS — Z87.81 PERSONAL HISTORY OF (HEALED) TRAUMATIC FRACTURE: ICD-10-CM

## 2021-05-08 DIAGNOSIS — Z79.4 LONG TERM (CURRENT) USE OF INSULIN: ICD-10-CM

## 2021-05-08 DIAGNOSIS — E11.621 TYPE 2 DIABETES MELLITUS WITH FOOT ULCER: ICD-10-CM

## 2021-05-08 DIAGNOSIS — I13.10 HYPERTENSIVE HEART AND CHRONIC KIDNEY DISEASE WITHOUT HEART FAILURE, WITH STAGE 1 THROUGH STAGE 4 CHRONIC KIDNEY DISEASE, OR UNSPECIFIED CHRONIC KIDNEY DISEASE: ICD-10-CM

## 2021-05-08 DIAGNOSIS — E78.5 HYPERLIPIDEMIA, UNSPECIFIED: ICD-10-CM

## 2021-05-08 DIAGNOSIS — Z80.3 FAMILY HISTORY OF MALIGNANT NEOPLASM OF BREAST: ICD-10-CM

## 2021-05-08 DIAGNOSIS — E11.40 TYPE 2 DIABETES MELLITUS WITH DIABETIC NEUROPATHY, UNSPECIFIED: ICD-10-CM

## 2021-05-08 DIAGNOSIS — Z89.421 ACQUIRED ABSENCE OF OTHER RIGHT TOE(S): ICD-10-CM

## 2021-05-08 DIAGNOSIS — Z79.899 OTHER LONG TERM (CURRENT) DRUG THERAPY: ICD-10-CM

## 2021-05-08 DIAGNOSIS — N18.30 CHRONIC KIDNEY DISEASE, STAGE 3 UNSPECIFIED: ICD-10-CM

## 2021-05-08 DIAGNOSIS — L97.512 NON-PRESSURE CHRONIC ULCER OF OTHER PART OF RIGHT FOOT WITH FAT LAYER EXPOSED: ICD-10-CM

## 2021-05-08 DIAGNOSIS — E11.22 TYPE 2 DIABETES MELLITUS WITH DIABETIC CHRONIC KIDNEY DISEASE: ICD-10-CM

## 2021-05-08 DIAGNOSIS — Z80.8 FAMILY HISTORY OF MALIGNANT NEOPLASM OF OTHER ORGANS OR SYSTEMS: ICD-10-CM

## 2021-05-08 DIAGNOSIS — G47.33 OBSTRUCTIVE SLEEP APNEA (ADULT) (PEDIATRIC): ICD-10-CM

## 2021-05-08 DIAGNOSIS — Z83.3 FAMILY HISTORY OF DIABETES MELLITUS: ICD-10-CM

## 2021-05-08 DIAGNOSIS — Z85.828 PERSONAL HISTORY OF OTHER MALIGNANT NEOPLASM OF SKIN: ICD-10-CM

## 2021-05-08 DIAGNOSIS — Z79.82 LONG TERM (CURRENT) USE OF ASPIRIN: ICD-10-CM

## 2021-05-21 ENCOUNTER — NON-APPOINTMENT (OUTPATIENT)
Age: 56
End: 2021-05-21

## 2021-08-20 ENCOUNTER — RX RENEWAL (OUTPATIENT)
Age: 56
End: 2021-08-20
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
## 2021-09-19 ENCOUNTER — INPATIENT (INPATIENT)
Facility: HOSPITAL | Age: 56
LOS: 2 days | Discharge: HOME | DRG: 623 | End: 2021-09-22
Attending: INTERNAL MEDICINE | Admitting: STUDENT IN AN ORGANIZED HEALTH CARE EDUCATION/TRAINING PROGRAM
Payer: MEDICARE

## 2021-09-19 VITALS
SYSTOLIC BLOOD PRESSURE: 180 MMHG | RESPIRATION RATE: 18 BRPM | HEIGHT: 73 IN | TEMPERATURE: 97 F | WEIGHT: 300.05 LBS | HEART RATE: 79 BPM | DIASTOLIC BLOOD PRESSURE: 96 MMHG | OXYGEN SATURATION: 98 %

## 2021-09-19 DIAGNOSIS — N18.6 END STAGE RENAL DISEASE: Chronic | ICD-10-CM

## 2021-09-19 DIAGNOSIS — Z89.421 ACQUIRED ABSENCE OF OTHER RIGHT TOE(S): Chronic | ICD-10-CM

## 2021-09-19 DIAGNOSIS — Z94.0 KIDNEY TRANSPLANT STATUS: Chronic | ICD-10-CM

## 2021-09-19 LAB
ALBUMIN SERPL ELPH-MCNC: 3.2 G/DL — LOW (ref 3.3–5)
ALP SERPL-CCNC: 94 U/L — SIGNIFICANT CHANGE UP (ref 40–120)
ALT FLD-CCNC: 25 U/L — SIGNIFICANT CHANGE UP (ref 12–78)
ANION GAP SERPL CALC-SCNC: 6 MMOL/L — SIGNIFICANT CHANGE UP (ref 5–17)
APTT BLD: 31.8 SEC — SIGNIFICANT CHANGE UP (ref 27.5–35.5)
AST SERPL-CCNC: 40 U/L — HIGH (ref 15–37)
BASOPHILS # BLD AUTO: 0.05 K/UL — SIGNIFICANT CHANGE UP (ref 0–0.2)
BASOPHILS NFR BLD AUTO: 0.5 % — SIGNIFICANT CHANGE UP (ref 0–2)
BILIRUB SERPL-MCNC: 0.6 MG/DL — SIGNIFICANT CHANGE UP (ref 0.2–1.2)
BUN SERPL-MCNC: 29 MG/DL — HIGH (ref 7–23)
CALCIUM SERPL-MCNC: 8.4 MG/DL — LOW (ref 8.5–10.1)
CHLORIDE SERPL-SCNC: 105 MMOL/L — SIGNIFICANT CHANGE UP (ref 96–108)
CO2 SERPL-SCNC: 27 MMOL/L — SIGNIFICANT CHANGE UP (ref 22–31)
CREAT SERPL-MCNC: 1.5 MG/DL — HIGH (ref 0.5–1.3)
EOSINOPHIL # BLD AUTO: 0.24 K/UL — SIGNIFICANT CHANGE UP (ref 0–0.5)
EOSINOPHIL NFR BLD AUTO: 2.6 % — SIGNIFICANT CHANGE UP (ref 0–6)
GLUCOSE SERPL-MCNC: 102 MG/DL — HIGH (ref 70–99)
HCT VFR BLD CALC: 36.8 % — LOW (ref 39–50)
HGB BLD-MCNC: 12.1 G/DL — LOW (ref 13–17)
IMM GRANULOCYTES NFR BLD AUTO: 0.3 % — SIGNIFICANT CHANGE UP (ref 0–1.5)
INR BLD: 1.11 RATIO — SIGNIFICANT CHANGE UP (ref 0.88–1.16)
LACTATE SERPL-SCNC: 0.3 MMOL/L — LOW (ref 0.7–2)
LYMPHOCYTES # BLD AUTO: 1.39 K/UL — SIGNIFICANT CHANGE UP (ref 1–3.3)
LYMPHOCYTES # BLD AUTO: 14.8 % — SIGNIFICANT CHANGE UP (ref 13–44)
MCHC RBC-ENTMCNC: 28.5 PG — SIGNIFICANT CHANGE UP (ref 27–34)
MCHC RBC-ENTMCNC: 32.9 GM/DL — SIGNIFICANT CHANGE UP (ref 32–36)
MCV RBC AUTO: 86.8 FL — SIGNIFICANT CHANGE UP (ref 80–100)
MONOCYTES # BLD AUTO: 0.9 K/UL — SIGNIFICANT CHANGE UP (ref 0–0.9)
MONOCYTES NFR BLD AUTO: 9.6 % — SIGNIFICANT CHANGE UP (ref 2–14)
NEUTROPHILS # BLD AUTO: 6.78 K/UL — SIGNIFICANT CHANGE UP (ref 1.8–7.4)
NEUTROPHILS NFR BLD AUTO: 72.2 % — SIGNIFICANT CHANGE UP (ref 43–77)
NRBC # BLD: 0 /100 WBCS — SIGNIFICANT CHANGE UP (ref 0–0)
PLATELET # BLD AUTO: 279 K/UL — SIGNIFICANT CHANGE UP (ref 150–400)
POTASSIUM SERPL-MCNC: 4.2 MMOL/L — SIGNIFICANT CHANGE UP (ref 3.5–5.3)
POTASSIUM SERPL-SCNC: 4.2 MMOL/L — SIGNIFICANT CHANGE UP (ref 3.5–5.3)
PROT SERPL-MCNC: 7.7 G/DL — SIGNIFICANT CHANGE UP (ref 6–8.3)
PROTHROM AB SERPL-ACNC: 12.9 SEC — SIGNIFICANT CHANGE UP (ref 10.6–13.6)
RBC # BLD: 4.24 M/UL — SIGNIFICANT CHANGE UP (ref 4.2–5.8)
RBC # FLD: 13.3 % — SIGNIFICANT CHANGE UP (ref 10.3–14.5)
SODIUM SERPL-SCNC: 138 MMOL/L — SIGNIFICANT CHANGE UP (ref 135–145)
WBC # BLD: 9.39 K/UL — SIGNIFICANT CHANGE UP (ref 3.8–10.5)
WBC # FLD AUTO: 9.39 K/UL — SIGNIFICANT CHANGE UP (ref 3.8–10.5)

## 2021-09-19 PROCEDURE — 71045 X-RAY EXAM CHEST 1 VIEW: CPT | Mod: 26

## 2021-09-19 PROCEDURE — 93971 EXTREMITY STUDY: CPT | Mod: 26,RT

## 2021-09-19 PROCEDURE — 99285 EMERGENCY DEPT VISIT HI MDM: CPT

## 2021-09-19 RX ORDER — VANCOMYCIN HCL 1 G
1000 VIAL (EA) INTRAVENOUS ONCE
Refills: 0 | Status: COMPLETED | OUTPATIENT
Start: 2021-09-19 | End: 2021-09-19

## 2021-09-19 RX ORDER — SODIUM CHLORIDE 9 MG/ML
1000 INJECTION INTRAMUSCULAR; INTRAVENOUS; SUBCUTANEOUS ONCE
Refills: 0 | Status: COMPLETED | OUTPATIENT
Start: 2021-09-19 | End: 2021-09-19

## 2021-09-19 RX ORDER — PIPERACILLIN AND TAZOBACTAM 4; .5 G/20ML; G/20ML
3.38 INJECTION, POWDER, LYOPHILIZED, FOR SOLUTION INTRAVENOUS ONCE
Refills: 0 | Status: COMPLETED | OUTPATIENT
Start: 2021-09-19 | End: 2021-09-19

## 2021-09-19 RX ADMIN — SODIUM CHLORIDE 1000 MILLILITER(S): 9 INJECTION INTRAMUSCULAR; INTRAVENOUS; SUBCUTANEOUS at 22:30

## 2021-09-19 RX ADMIN — SODIUM CHLORIDE 1000 MILLILITER(S): 9 INJECTION INTRAMUSCULAR; INTRAVENOUS; SUBCUTANEOUS at 23:30

## 2021-09-19 RX ADMIN — PIPERACILLIN AND TAZOBACTAM 200 GRAM(S): 4; .5 INJECTION, POWDER, LYOPHILIZED, FOR SOLUTION INTRAVENOUS at 22:30

## 2021-09-19 RX ADMIN — PIPERACILLIN AND TAZOBACTAM 3.38 GRAM(S): 4; .5 INJECTION, POWDER, LYOPHILIZED, FOR SOLUTION INTRAVENOUS at 23:00

## 2021-09-19 NOTE — ED ADULT NURSE NOTE - CHPI ED NUR TIMING2
Topical Sulfur Applications Pregnancy And Lactation Text: This medication is Pregnancy Category C and has an unknown safety profile during pregnancy. It is unknown if this topical medication is excreted in breast milk. Tazorac Pregnancy And Lactation Text: This medication is not safe during pregnancy. It is unknown if this medication is excreted in breast milk. Tetracycline Pregnancy And Lactation Text: This medication is Pregnancy Category D and not consider safe during pregnancy. It is also excreted in breast milk. Sarecycline Counseling: Patient advised regarding possible photosensitivity and discoloration of the teeth, skin, lips, tongue and gums.  Patient instructed to avoid sunlight, if possible.  When exposed to sunlight, patients should wear protective clothing, sunglasses, and sunscreen.  The patient was instructed to call the office immediately if the following severe adverse effects occur:  hearing changes, easy bruising/bleeding, severe headache, or vision changes.  The patient verbalized understanding of the proper use and possible adverse effects of sarecycline.  All of the patient's questions and concerns were addressed. Topical Retinoid counseling:  Patient advised to apply a pea-sized amount only at bedtime and wait 30 minutes after washing their face before applying.  If too drying, patient may add a non-comedogenic moisturizer. The patient verbalized understanding of the proper use and possible adverse effects of retinoids.  All of the patient's questions and concerns were addressed. Benzoyl Peroxide Counseling: Patient counseled that medicine may cause skin irritation and bleach clothing.  In the event of skin irritation, the patient was advised to reduce the amount of the drug applied or use it less frequently.   The patient verbalized understanding of the proper use and possible adverse effects of benzoyl peroxide.  All of the patient's questions and concerns were addressed. Doxycycline Counseling:  Patient counseled regarding possible photosensitivity and increased risk for sunburn.  Patient instructed to avoid sunlight, if possible.  When exposed to sunlight, patients should wear protective clothing, sunglasses, and sunscreen.  The patient was instructed to call the office immediately if the following severe adverse effects occur:  hearing changes, easy bruising/bleeding, severe headache, or vision changes.  The patient verbalized understanding of the proper use and possible adverse effects of doxycycline.  All of the patient's questions and concerns were addressed. Erythromycin Counseling:  I discussed with the patient the risks of erythromycin including but not limited to GI upset, allergic reaction, drug rash, diarrhea, increase in liver enzymes, and yeast infections. Include Pregnancy/Lactation Warning?: No Topical Clindamycin Pregnancy And Lactation Text: This medication is Pregnancy Category B and is considered safe during pregnancy. It is unknown if it is excreted in breast milk. Tazorac Counseling:  Patient advised that medication is irritating and drying.  Patient may need to apply sparingly and wash off after an hour before eventually leaving it on overnight.  The patient verbalized understanding of the proper use and possible adverse effects of tazorac.  All of the patient's questions and concerns were addressed. Azithromycin Counseling:  I discussed with the patient the risks of azithromycin including but not limited to GI upset, allergic reaction, drug rash, diarrhea, and yeast infections. Detail Level: Zone Topical Clindamycin Counseling: Patient counseled that this medication may cause skin irritation or allergic reactions.  In the event of skin irritation, the patient was advised to reduce the amount of the drug applied or use it less frequently.   The patient verbalized understanding of the proper use and possible adverse effects of clindamycin.  All of the patient's questions and concerns were addressed. Doxycycline Pregnancy And Lactation Text: This medication is Pregnancy Category D and not consider safe during pregnancy. It is also excreted in breast milk but is considered safe for shorter treatment courses. Spironolactone Pregnancy And Lactation Text: This medication can cause feminization of the male fetus and should be avoided during pregnancy. The active metabolite is also found in breast milk. Minocycline Counseling: Patient advised regarding possible photosensitivity and discoloration of the teeth, skin, lips, tongue and gums.  Patient instructed to avoid sunlight, if possible.  When exposed to sunlight, patients should wear protective clothing, sunglasses, and sunscreen.  The patient was instructed to call the office immediately if the following severe adverse effects occur:  hearing changes, easy bruising/bleeding, severe headache, or vision changes.  The patient verbalized understanding of the proper use and possible adverse effects of minocycline.  All of the patient's questions and concerns were addressed. High Dose Vitamin A Pregnancy And Lactation Text: High dose vitamin A therapy is contraindicated during pregnancy and breast feeding. Birth Control Pills Pregnancy And Lactation Text: This medication should be avoided if pregnant and for the first 30 days post-partum. Topical Sulfur Applications Counseling: Topical Sulfur Counseling: Patient counseled that this medication may cause skin irritation or allergic reactions.  In the event of skin irritation, the patient was advised to reduce the amount of the drug applied or use it less frequently.   The patient verbalized understanding of the proper use and possible adverse effects of topical sulfur application.  All of the patient's questions and concerns were addressed. Topical Retinoid Pregnancy And Lactation Text: This medication is Pregnancy Category C. It is unknown if this medication is excreted in breast milk. Dapsone Pregnancy And Lactation Text: This medication is Pregnancy Category C and is not considered safe during pregnancy or breast feeding. Benzoyl Peroxide Pregnancy And Lactation Text: This medication is Pregnancy Category C. It is unknown if benzoyl peroxide is excreted in breast milk. Birth Control Pills Counseling: Birth Control Pill Counseling: I discussed with the patient the potential side effects of OCPs including but not limited to increased risk of stroke, heart attack, thrombophlebitis, deep venous thrombosis, hepatic adenomas, breast changes, GI upset, headaches, and depression.  The patient verbalized understanding of the proper use and possible adverse effects of OCPs. All of the patient's questions and concerns were addressed. Bactrim Counseling:  I discussed with the patient the risks of sulfa antibiotics including but not limited to GI upset, allergic reaction, drug rash, diarrhea, dizziness, photosensitivity, and yeast infections.  Rarely, more serious reactions can occur including but not limited to aplastic anemia, agranulocytosis, methemoglobinemia, blood dyscrasias, liver or kidney failure, lung infiltrates or desquamative/blistering drug rashes. Isotretinoin Counseling: Patient should get monthly blood tests, not donate blood, not drive at night if vision affected, not share medication, and not undergo elective surgery for 6 months after tx completed. Side effects reviewed, pt to contact office should one occur. Dapsone Counseling: I discussed with the patient the risks of dapsone including but not limited to hemolytic anemia, agranulocytosis, rashes, methemoglobinemia, kidney failure, peripheral neuropathy, headaches, GI upset, and liver toxicity.  Patients who start dapsone require monitoring including baseline LFTs and weekly CBCs for the first month, then every month thereafter.  The patient verbalized understanding of the proper use and possible adverse effects of dapsone.  All of the patient's questions and concerns were addressed. High Dose Vitamin A Counseling: Side effects reviewed, pt to contact office should one occur. Isotretinoin Pregnancy And Lactation Text: This medication is Pregnancy Category X and is considered extremely dangerous during pregnancy. It is unknown if it is excreted in breast milk. Azithromycin Pregnancy And Lactation Text: This medication is considered safe during pregnancy and is also secreted in breast milk. Tetracycline Counseling: Patient counseled regarding possible photosensitivity and increased risk for sunburn.  Patient instructed to avoid sunlight, if possible.  When exposed to sunlight, patients should wear protective clothing, sunglasses, and sunscreen.  The patient was instructed to call the office immediately if the following severe adverse effects occur:  hearing changes, easy bruising/bleeding, severe headache, or vision changes.  The patient verbalized understanding of the proper use and possible adverse effects of tetracycline.  All of the patient's questions and concerns were addressed. Patient understands to avoid pregnancy while on therapy due to potential birth defects. Spironolactone Counseling: Patient advised regarding risks of diarrhea, abdominal pain, hyperkalemia, birth defects (for female patients), liver toxicity and renal toxicity. The patient may need blood work to monitor liver and kidney function and potassium levels while on therapy. The patient verbalized understanding of the proper use and possible adverse effects of spironolactone.  All of the patient's questions and concerns were addressed. Bactrim Pregnancy And Lactation Text: This medication is Pregnancy Category D and is known to cause fetal risk.  It is also excreted in breast milk. Erythromycin Pregnancy And Lactation Text: This medication is Pregnancy Category B and is considered safe during pregnancy. It is also excreted in breast milk. sudden onset

## 2021-09-19 NOTE — ED ADULT NURSE NOTE - NSIMPLEMENTINTERV_GEN_ALL_ED
Implemented All Universal Safety Interventions:  [place] to call system. Call bell, personal items and telephone within reach. Instruct patient to call for assistance. Room bathroom lighting operational. Non-slip footwear when patient is off stretcher. Physically safe environment: no spills, clutter or unnecessary equipment. Stretcher in lowest position, wheels locked, appropriate side rails in place.

## 2021-09-19 NOTE — ED PROVIDER NOTE - NSICDXPASTMEDICALHX_GEN_ALL_CORE_FT
PAST MEDICAL HISTORY:  CKD (chronic kidney disease) Renal Transplant 2013 at [facility]    CVA (cerebral vascular accident)     Diabetes mellitus     Diabetic peripheral neuropathy     History of DVT (deep vein thrombosis)     Hyperlipidemia     Hypertension     Obesity (BMI 30-39.9)     Recurrent squamous cell carcinoma of skin nose, L arm    Squamous cell carcinoma of skin of left ear

## 2021-09-19 NOTE — ED PROVIDER NOTE - CARE PLAN
1 Principal Discharge DX:	Cellulitis of leg, right  Secondary Diagnosis:	Osteomyelitis of ankle or foot, acute

## 2021-09-19 NOTE — ED PROVIDER NOTE - NSICDXPASTSURGICALHX_GEN_ALL_CORE_FT
PAST SURGICAL HISTORY:  Ear disease Left inner ear surgery, 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, Can NOT have MRI    End-stage renal failure with renal transplant 12/2013    H/O foot surgery 2005 - right foot - bone fracture - s/p ORIF and subsequent removal of hardware    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     S/P kidney transplant     Toe infection 2007 L 4th Toe surgery-amputation secondary to osteomyelitis    Vasectomy status s/p b/l  vasectomy

## 2021-09-19 NOTE — ED PROVIDER NOTE - OBJECTIVE STATEMENT
54yo male who presents with right leg cellulitis, pt is diabetic and states he has hx of osteo, and cut his foot 2 days ago and since then has increased redness and swelling to foot, +foul smelling drainage, no fever, chills, no other complaints

## 2021-09-19 NOTE — ED ADULT NURSE NOTE - OBJECTIVE STATEMENT
55 yr old male c/o right foot wound since thursday and worsening since friday. A+O x 4. PMH of DM II- insulin pump and glucometer in use. PMH of CVA, toe amputations, Right kidney transplant. 2 wounds noted to the right foot. 1 large white/closed unstageable wound noted to the plantar foot and 1 open wound noted to the lateral foot. No drainage or bleeding noted to either wound/ + 3 edema, redness, and warmth noted to the right leg. + 2 edema noted to the left leg without swelling and redness. Due to Neuropathy, patient is unsure how or when he acquired the wounds. Pt denies fever, CP, sob, dizziness. PMH of covid in feb/march/april. unvaccinated due to antibodies. Independently ambulatory. will continue to monitor.

## 2021-09-19 NOTE — ED ADULT NURSE NOTE - NSICDXPASTMEDICALHX_GEN_ALL_CORE_FT
PAST MEDICAL HISTORY:  CKD (chronic kidney disease) Renal Transplant 2013 at [facility]    CVA (cerebral vascular accident)     Diabetes mellitus     Diabetic peripheral neuropathy     History of DVT (deep vein thrombosis)     Hyperlipidemia     Hypertension     Obesity (BMI 30-39.9)     Recurrent squamous cell carcinoma of skin nose, L arm    Squamous cell carcinoma of skin of left ear

## 2021-09-19 NOTE — ED ADULT NURSE NOTE - CHPI ED NUR SYMPTOMS NEG
no bleeding at site/no blood in mucus/no chills/no drainage/no fever/no pain/no purulent drainage/no vomiting

## 2021-09-20 ENCOUNTER — TRANSCRIPTION ENCOUNTER (OUTPATIENT)
Age: 56
End: 2021-09-20

## 2021-09-20 DIAGNOSIS — E78.5 HYPERLIPIDEMIA, UNSPECIFIED: ICD-10-CM

## 2021-09-20 DIAGNOSIS — L03.115 CELLULITIS OF RIGHT LOWER LIMB: ICD-10-CM

## 2021-09-20 DIAGNOSIS — E10.65 TYPE 1 DIABETES MELLITUS WITH HYPERGLYCEMIA: ICD-10-CM

## 2021-09-20 DIAGNOSIS — I10 ESSENTIAL (PRIMARY) HYPERTENSION: ICD-10-CM

## 2021-09-20 DIAGNOSIS — D64.9 ANEMIA, UNSPECIFIED: ICD-10-CM

## 2021-09-20 DIAGNOSIS — Z94.0 KIDNEY TRANSPLANT STATUS: ICD-10-CM

## 2021-09-20 DIAGNOSIS — E11.9 TYPE 2 DIABETES MELLITUS WITHOUT COMPLICATIONS: ICD-10-CM

## 2021-09-20 DIAGNOSIS — Z29.9 ENCOUNTER FOR PROPHYLACTIC MEASURES, UNSPECIFIED: ICD-10-CM

## 2021-09-20 DIAGNOSIS — L03.90 CELLULITIS, UNSPECIFIED: ICD-10-CM

## 2021-09-20 DIAGNOSIS — E11.621 TYPE 2 DIABETES MELLITUS WITH FOOT ULCER: ICD-10-CM

## 2021-09-20 DIAGNOSIS — N18.9 CHRONIC KIDNEY DISEASE, UNSPECIFIED: ICD-10-CM

## 2021-09-20 DIAGNOSIS — G47.33 OBSTRUCTIVE SLEEP APNEA (ADULT) (PEDIATRIC): ICD-10-CM

## 2021-09-20 LAB
A1C WITH ESTIMATED AVERAGE GLUCOSE RESULT: 7.8 % — HIGH (ref 4–5.6)
ALBUMIN SERPL ELPH-MCNC: 2.7 G/DL — LOW (ref 3.3–5)
ALP SERPL-CCNC: 84 U/L — SIGNIFICANT CHANGE UP (ref 40–120)
ALT FLD-CCNC: 20 U/L — SIGNIFICANT CHANGE UP (ref 12–78)
ANION GAP SERPL CALC-SCNC: 7 MMOL/L — SIGNIFICANT CHANGE UP (ref 5–17)
AST SERPL-CCNC: 20 U/L — SIGNIFICANT CHANGE UP (ref 15–37)
BASOPHILS # BLD AUTO: 0.06 K/UL — SIGNIFICANT CHANGE UP (ref 0–0.2)
BASOPHILS NFR BLD AUTO: 0.8 % — SIGNIFICANT CHANGE UP (ref 0–2)
BILIRUB SERPL-MCNC: 0.5 MG/DL — SIGNIFICANT CHANGE UP (ref 0.2–1.2)
BUN SERPL-MCNC: 23 MG/DL — SIGNIFICANT CHANGE UP (ref 7–23)
CALCIUM SERPL-MCNC: 8.2 MG/DL — LOW (ref 8.5–10.1)
CHLORIDE SERPL-SCNC: 105 MMOL/L — SIGNIFICANT CHANGE UP (ref 96–108)
CO2 SERPL-SCNC: 29 MMOL/L — SIGNIFICANT CHANGE UP (ref 22–31)
CREAT SERPL-MCNC: 1.1 MG/DL — SIGNIFICANT CHANGE UP (ref 0.5–1.3)
CRP SERPL-MCNC: 84 MG/L — HIGH
EOSINOPHIL # BLD AUTO: 0.27 K/UL — SIGNIFICANT CHANGE UP (ref 0–0.5)
EOSINOPHIL NFR BLD AUTO: 3.6 % — SIGNIFICANT CHANGE UP (ref 0–6)
ERYTHROCYTE [SEDIMENTATION RATE] IN BLOOD: 44 MM/HR — HIGH (ref 0–20)
ESTIMATED AVERAGE GLUCOSE: 177 MG/DL — HIGH (ref 68–114)
GLUCOSE SERPL-MCNC: 61 MG/DL — LOW (ref 70–99)
HCT VFR BLD CALC: 34.6 % — LOW (ref 39–50)
HGB BLD-MCNC: 11.3 G/DL — LOW (ref 13–17)
IMM GRANULOCYTES NFR BLD AUTO: 0.4 % — SIGNIFICANT CHANGE UP (ref 0–1.5)
LYMPHOCYTES # BLD AUTO: 1.18 K/UL — SIGNIFICANT CHANGE UP (ref 1–3.3)
LYMPHOCYTES # BLD AUTO: 15.7 % — SIGNIFICANT CHANGE UP (ref 13–44)
MCHC RBC-ENTMCNC: 28.8 PG — SIGNIFICANT CHANGE UP (ref 27–34)
MCHC RBC-ENTMCNC: 32.7 GM/DL — SIGNIFICANT CHANGE UP (ref 32–36)
MCV RBC AUTO: 88 FL — SIGNIFICANT CHANGE UP (ref 80–100)
MONOCYTES # BLD AUTO: 0.79 K/UL — SIGNIFICANT CHANGE UP (ref 0–0.9)
MONOCYTES NFR BLD AUTO: 10.5 % — SIGNIFICANT CHANGE UP (ref 2–14)
MRSA PCR RESULT.: SIGNIFICANT CHANGE UP
NEUTROPHILS # BLD AUTO: 5.2 K/UL — SIGNIFICANT CHANGE UP (ref 1.8–7.4)
NEUTROPHILS NFR BLD AUTO: 69 % — SIGNIFICANT CHANGE UP (ref 43–77)
NRBC # BLD: 0 /100 WBCS — SIGNIFICANT CHANGE UP (ref 0–0)
PLATELET # BLD AUTO: 247 K/UL — SIGNIFICANT CHANGE UP (ref 150–400)
POTASSIUM SERPL-MCNC: 3.7 MMOL/L — SIGNIFICANT CHANGE UP (ref 3.5–5.3)
POTASSIUM SERPL-SCNC: 3.7 MMOL/L — SIGNIFICANT CHANGE UP (ref 3.5–5.3)
PROCALCITONIN SERPL-MCNC: <0.05 — SIGNIFICANT CHANGE UP (ref 0–0.04)
PROT SERPL-MCNC: 6.8 G/DL — SIGNIFICANT CHANGE UP (ref 6–8.3)
RBC # BLD: 3.93 M/UL — LOW (ref 4.2–5.8)
RBC # FLD: 13.4 % — SIGNIFICANT CHANGE UP (ref 10.3–14.5)
S AUREUS DNA NOSE QL NAA+PROBE: SIGNIFICANT CHANGE UP
SARS-COV-2 RNA SPEC QL NAA+PROBE: SIGNIFICANT CHANGE UP
SODIUM SERPL-SCNC: 141 MMOL/L — SIGNIFICANT CHANGE UP (ref 135–145)
TACROLIMUS SERPL-MCNC: 5.7 NG/ML — SIGNIFICANT CHANGE UP
WBC # BLD: 7.53 K/UL — SIGNIFICANT CHANGE UP (ref 3.8–10.5)
WBC # FLD AUTO: 7.53 K/UL — SIGNIFICANT CHANGE UP (ref 3.8–10.5)

## 2021-09-20 PROCEDURE — 76376 3D RENDER W/INTRP POSTPROCES: CPT | Mod: 26

## 2021-09-20 PROCEDURE — 99221 1ST HOSP IP/OBS SF/LOW 40: CPT

## 2021-09-20 PROCEDURE — 99222 1ST HOSP IP/OBS MODERATE 55: CPT

## 2021-09-20 PROCEDURE — 99223 1ST HOSP IP/OBS HIGH 75: CPT | Mod: GC

## 2021-09-20 PROCEDURE — 73630 X-RAY EXAM OF FOOT: CPT | Mod: 26,RT

## 2021-09-20 PROCEDURE — 12345: CPT | Mod: NC,GC

## 2021-09-20 PROCEDURE — 73701 CT LOWER EXTREMITY W/DYE: CPT | Mod: 26,RT

## 2021-09-20 RX ORDER — DEXTROSE 50 % IN WATER 50 %
25 SYRINGE (ML) INTRAVENOUS ONCE
Refills: 0 | Status: DISCONTINUED | OUTPATIENT
Start: 2021-09-20 | End: 2021-09-21

## 2021-09-20 RX ORDER — ASPIRIN/CALCIUM CARB/MAGNESIUM 324 MG
81 TABLET ORAL DAILY
Refills: 0 | Status: DISCONTINUED | OUTPATIENT
Start: 2021-09-20 | End: 2021-09-21

## 2021-09-20 RX ORDER — GABAPENTIN 400 MG/1
300 CAPSULE ORAL
Refills: 0 | Status: DISCONTINUED | OUTPATIENT
Start: 2021-09-20 | End: 2021-09-21

## 2021-09-20 RX ORDER — AZATHIOPRINE 100 MG/1
100 TABLET ORAL DAILY
Refills: 0 | Status: DISCONTINUED | OUTPATIENT
Start: 2021-09-20 | End: 2021-09-21

## 2021-09-20 RX ORDER — HEPARIN SODIUM 5000 [USP'U]/ML
5000 INJECTION INTRAVENOUS; SUBCUTANEOUS EVERY 8 HOURS
Refills: 0 | Status: DISCONTINUED | OUTPATIENT
Start: 2021-09-20 | End: 2021-09-21

## 2021-09-20 RX ORDER — GLUCAGON INJECTION, SOLUTION 0.5 MG/.1ML
1 INJECTION, SOLUTION SUBCUTANEOUS ONCE
Refills: 0 | Status: DISCONTINUED | OUTPATIENT
Start: 2021-09-20 | End: 2021-09-21

## 2021-09-20 RX ORDER — DEXTROSE 50 % IN WATER 50 %
15 SYRINGE (ML) INTRAVENOUS ONCE
Refills: 0 | Status: DISCONTINUED | OUTPATIENT
Start: 2021-09-20 | End: 2021-09-21

## 2021-09-20 RX ORDER — INSULIN LISPRO 100/ML
1 VIAL (ML) SUBCUTANEOUS
Refills: 0 | Status: DISCONTINUED | OUTPATIENT
Start: 2021-09-20 | End: 2021-09-21

## 2021-09-20 RX ORDER — LOSARTAN POTASSIUM 100 MG/1
25 TABLET, FILM COATED ORAL DAILY
Refills: 0 | Status: DISCONTINUED | OUTPATIENT
Start: 2021-09-20 | End: 2021-09-20

## 2021-09-20 RX ORDER — SODIUM CHLORIDE 9 MG/ML
1000 INJECTION, SOLUTION INTRAVENOUS
Refills: 0 | Status: DISCONTINUED | OUTPATIENT
Start: 2021-09-20 | End: 2021-09-21

## 2021-09-20 RX ORDER — METOPROLOL TARTRATE 50 MG
75 TABLET ORAL
Refills: 0 | Status: DISCONTINUED | OUTPATIENT
Start: 2021-09-20 | End: 2021-09-21

## 2021-09-20 RX ORDER — DEXTROSE 50 % IN WATER 50 %
12.5 SYRINGE (ML) INTRAVENOUS ONCE
Refills: 0 | Status: DISCONTINUED | OUTPATIENT
Start: 2021-09-20 | End: 2021-09-21

## 2021-09-20 RX ORDER — INFLUENZA VIRUS VACCINE 15; 15; 15; 15 UG/.5ML; UG/.5ML; UG/.5ML; UG/.5ML
0.5 SUSPENSION INTRAMUSCULAR ONCE
Refills: 0 | Status: COMPLETED | OUTPATIENT
Start: 2021-09-20 | End: 2021-09-22

## 2021-09-20 RX ORDER — ATORVASTATIN CALCIUM 80 MG/1
10 TABLET, FILM COATED ORAL AT BEDTIME
Refills: 0 | Status: DISCONTINUED | OUTPATIENT
Start: 2021-09-20 | End: 2021-09-21

## 2021-09-20 RX ORDER — SODIUM CHLORIDE 9 MG/ML
1000 INJECTION INTRAMUSCULAR; INTRAVENOUS; SUBCUTANEOUS
Refills: 0 | Status: DISCONTINUED | OUTPATIENT
Start: 2021-09-20 | End: 2021-09-22

## 2021-09-20 RX ORDER — METOPROLOL TARTRATE 50 MG
75 TABLET ORAL
Refills: 0 | Status: DISCONTINUED | OUTPATIENT
Start: 2021-09-20 | End: 2021-09-20

## 2021-09-20 RX ORDER — HYDRALAZINE HCL 50 MG
25 TABLET ORAL THREE TIMES A DAY
Refills: 0 | Status: DISCONTINUED | OUTPATIENT
Start: 2021-09-20 | End: 2021-09-21

## 2021-09-20 RX ORDER — TACROLIMUS 5 MG/1
1.5 CAPSULE ORAL
Refills: 0 | Status: DISCONTINUED | OUTPATIENT
Start: 2021-09-20 | End: 2021-09-21

## 2021-09-20 RX ORDER — PIPERACILLIN AND TAZOBACTAM 4; .5 G/20ML; G/20ML
3.38 INJECTION, POWDER, LYOPHILIZED, FOR SOLUTION INTRAVENOUS EVERY 8 HOURS
Refills: 0 | Status: DISCONTINUED | OUTPATIENT
Start: 2021-09-20 | End: 2021-09-21

## 2021-09-20 RX ORDER — ASPIRIN/CALCIUM CARB/MAGNESIUM 324 MG
1 TABLET ORAL
Qty: 0 | Refills: 0 | DISCHARGE

## 2021-09-20 RX ORDER — FAMOTIDINE 10 MG/ML
20 INJECTION INTRAVENOUS DAILY
Refills: 0 | Status: DISCONTINUED | OUTPATIENT
Start: 2021-09-20 | End: 2021-09-21

## 2021-09-20 RX ORDER — LINEZOLID 600 MG/300ML
600 INJECTION, SOLUTION INTRAVENOUS ONCE
Refills: 0 | Status: COMPLETED | OUTPATIENT
Start: 2021-09-20 | End: 2021-09-20

## 2021-09-20 RX ADMIN — PIPERACILLIN AND TAZOBACTAM 25 GRAM(S): 4; .5 INJECTION, POWDER, LYOPHILIZED, FOR SOLUTION INTRAVENOUS at 14:00

## 2021-09-20 RX ADMIN — Medication 0.1 MILLIGRAM(S): at 06:58

## 2021-09-20 RX ADMIN — ATORVASTATIN CALCIUM 10 MILLIGRAM(S): 80 TABLET, FILM COATED ORAL at 21:47

## 2021-09-20 RX ADMIN — Medication 75 MILLIGRAM(S): at 17:10

## 2021-09-20 RX ADMIN — Medication 25 MILLIGRAM(S): at 06:58

## 2021-09-20 RX ADMIN — Medication 75 MILLIGRAM(S): at 06:58

## 2021-09-20 RX ADMIN — TACROLIMUS 1.5 MILLIGRAM(S): 5 CAPSULE ORAL at 21:34

## 2021-09-20 RX ADMIN — HEPARIN SODIUM 5000 UNIT(S): 5000 INJECTION INTRAVENOUS; SUBCUTANEOUS at 13:06

## 2021-09-20 RX ADMIN — FAMOTIDINE 20 MILLIGRAM(S): 10 INJECTION INTRAVENOUS at 12:00

## 2021-09-20 RX ADMIN — AZATHIOPRINE 100 MILLIGRAM(S): 100 TABLET ORAL at 12:00

## 2021-09-20 RX ADMIN — Medication 25 MILLIGRAM(S): at 13:06

## 2021-09-20 RX ADMIN — LINEZOLID 300 MILLIGRAM(S): 600 INJECTION, SOLUTION INTRAVENOUS at 06:16

## 2021-09-20 RX ADMIN — GABAPENTIN 300 MILLIGRAM(S): 400 CAPSULE ORAL at 07:01

## 2021-09-20 RX ADMIN — Medication 25 MILLIGRAM(S): at 21:47

## 2021-09-20 RX ADMIN — Medication 250 MILLIGRAM(S): at 01:16

## 2021-09-20 RX ADMIN — PIPERACILLIN AND TAZOBACTAM 25 GRAM(S): 4; .5 INJECTION, POWDER, LYOPHILIZED, FOR SOLUTION INTRAVENOUS at 21:48

## 2021-09-20 RX ADMIN — Medication 0.1 MILLIGRAM(S): at 17:11

## 2021-09-20 RX ADMIN — GABAPENTIN 300 MILLIGRAM(S): 400 CAPSULE ORAL at 17:10

## 2021-09-20 RX ADMIN — HEPARIN SODIUM 5000 UNIT(S): 5000 INJECTION INTRAVENOUS; SUBCUTANEOUS at 06:58

## 2021-09-20 RX ADMIN — HEPARIN SODIUM 5000 UNIT(S): 5000 INJECTION INTRAVENOUS; SUBCUTANEOUS at 21:47

## 2021-09-20 RX ADMIN — TACROLIMUS 1.5 MILLIGRAM(S): 5 CAPSULE ORAL at 09:32

## 2021-09-20 RX ADMIN — Medication 81 MILLIGRAM(S): at 12:00

## 2021-09-20 RX ADMIN — PIPERACILLIN AND TAZOBACTAM 25 GRAM(S): 4; .5 INJECTION, POWDER, LYOPHILIZED, FOR SOLUTION INTRAVENOUS at 07:37

## 2021-09-20 RX ADMIN — SODIUM CHLORIDE 80 MILLILITER(S): 9 INJECTION INTRAMUSCULAR; INTRAVENOUS; SUBCUTANEOUS at 13:06

## 2021-09-20 NOTE — CONSULT NOTE ADULT - PROBLEM SELECTOR RECOMMENDATION 9
Type 1  A1c 7.8% adm right LE cellulitis  insulin pump- medtronic humalog insulin - CGM dexcom removed not replaced 2/2 CT scan  Recommend endocrine- perlman   consent form and attestation form- completed.  FU appt: Oct 8th 10am Dr Garcia  Diabetes support group info given  Goal 100-180 mg/dL

## 2021-09-20 NOTE — H&P ADULT - ATTENDING COMMENTS
55 y old M with PMHx of CKD s/p renal transplant, CVA, DM type 1 (on insulin pump), neuropathy, DVT, HLD, Hypertension, Obesity, CARMEN (On nocturnal NIPPV), Restrictive Lung Disease, squamous cell carcinoma presents to ED for redness/swelling R leg. Admitted for RLE cellulitis and possible osteomyelitis.    admit to medicine  will cover broadly for now w/ linezolid + zosyn  check MRSA PCR and wound culture, follow up blood cultures  ID consult Dr. Saucedo for antibiotic guidance  podiatry Dr. Catherine to evaluate for local care vs surgical intervention  cannot have MRI due to metal implant behind his ear (stapes)  will check ESR/CRP and XR of foot  nephrology consult for transplant history w/ elevated Cr  d/w patient at bedside    Agree with H&P as outlined above, edited where appropriate.

## 2021-09-20 NOTE — H&P ADULT - NSHPSOCIALHISTORY_GEN_ALL_CORE
martial status: , lives with wife and 3 kids  Etoh- social drinker  tobacco- never smoker  recreational drug use- denies use  independent in ADLs, walks without assistance

## 2021-09-20 NOTE — H&P ADULT - PROBLEM SELECTOR PLAN 4
Chronic  -Patient has history of CARMEN 2/2 to obesity   -Patient on VPAP at home  -Patient does not remember VPAP settings, will bring machine from home Chronic  -Patient has history of CARMEN 2/2 to obesity   -Patient on NIPPV at home  -Patient does not remember settings, will bring machine from home  -follows with Dr. Pollo Boyce (Kenton Bal covers him)

## 2021-09-20 NOTE — H&P ADULT - PROBLEM SELECTOR PLAN 3
-On admission patient h/h 12.1/36.8  -baseline Is 14.5  -could be 2/2 to longstanding CKD--> renal transplant  -continue to trend h/h  -transfuse of s&s of blood loss or if hgb <7 -On admission patient h/h 12.1/36.8  -baseline Is 14.5  -likely due to longstanding CKD--> renal transplant  -continue to trend h/h  -transfuse of s&s of blood loss or if hgb <7

## 2021-09-20 NOTE — CONSULT NOTE ADULT - ASSESSMENT
55m with h/o renal transplant immune suppressed on bactrim ppx, dm, htn, hld, aida, rle osteomyelitis of his 2 and 3 digits requiring amputation  here with rle swelling redness/pain following trauma  cellulitis r/o deep infection    plan  no dvt   ct without deep infection/no om and timing of 4 days so doubt osteomyelitis  no fever,   sed rate 44-   f/u  blood cx  check mrsa screen- not purulent so low concern  cont agressive local care  keep elevated  pt with dm and immune suppressive meds for renal tx so cont broad antibx zosyn - to complete 5-7 days depending on clinical course    bactrim ppx.

## 2021-09-20 NOTE — CONSULT NOTE ADULT - SUBJECTIVE AND OBJECTIVE BOX
[facility] - Julisa Jansen, Neo, Luly, Leticia, Eugenio Russo  Office Number: [phone]    Initial Consult Note    CHIEF COMPLAINT: Patient is a 55y old  Male who presents with a chief complaint of cellulitis       HPI:  Patient is a 55 y old M with PMHx of CKD s/p renal transplant, CVA, s/p negative ILR monitor, never removed, DM type 1 (on insulin pump), neuropathy, DVT, HLD, Hypertension, Obesity, CARMEN (On nocturnal NIPPV), Restrictive Lung Disease, squamous cell carcinoma presents to ED for redness/swelling R leg. 3 days ago patient cut his foot, initially there was discharge, however that stopped within the first day. Patient states that he began to see swelling, redness, and felt 5/10 throbbing non-radiating pain in RLE. He has had a history of osteomyelitis before on the same foot, was treated with IV ceftriaxone. Denies chest pain, palpitations, dyspnea, headache, dizziness, abdominal pain, n/v/d. He is a renal transplant patient on anti-rejection medication - takes tacrolimus at 930AM and 930PM. Follows with nephrology Dr. Weber and states his baseline creatinine is 1.2.    In the ED   Vitals 97.2 F | HR 79 | /96-->117/64 | RR 18 | 98% RA   Labs: h/h 12.1/36.8, BUN 29, Cr 1.5, Alb 3.2, AST 40, GFR 52, Lactate 0.3  Duplex U/S- No evidence of right lower extremity deep venous thrombosis. Subcutaneous edema is visualized throughout the right calf.  CXR- no acute infiltrates on personal read  EKG: Sinus rhythm with 1st degree AV block 64 bpm, QT/QTc 406/418    Patient reports that he can walk at least one half mile and climb two flights of stairs with no increased dyspnea.  He denies chest pain.  No PND, orthopnea, dizziness, or syncope.  He does not follow with a cardiologist.        PAST MEDICAL & SURGICAL HISTORY:  Hypertension    Hyperlipidemia    Diabetes mellitus  Type 1 on insulin pump    CKD (chronic kidney disease)  Renal Transplant 2013 at [facility]    Diabetic peripheral neuropathy    Obesity (BMI 30-39.9)    CVA (cerebral vascular accident)  [place] Stroke  low L body sensation  low R face sensation  decreased peripheral vision  poor balance    Squamous cell carcinoma of skin of left ear    History of DVT (deep vein thrombosis)    Recurrent squamous cell carcinoma of skin  nose, L arm    Toe infection  2007 L 4th Toe surgery-amputation secondary to osteomyelitis    Ear disease  Left inner ear surgery, 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, Can NOT have MRI    Vasectomy status  s/p b/l  vasectomy    H/O foot surgery  2005 - right foot - bone fracture - s/p ORIF and subsequent removal of hardware    End-stage renal failure with renal transplant  12/2013    S/P kidney transplant    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        SOCIAL HISTORY:  No tobacco, ethanol, or drug abuse.    FAMILY HISTORY:  Family history of diabetes mellitus (DM)    FH: skin cancer      No family history of acute MI or sudden cardiac death.    MEDICATIONS  (STANDING):  aspirin enteric coated 81 milliGRAM(s) Oral daily  atorvastatin 10 milliGRAM(s) Oral at bedtime  azaTHIOprine 100 milliGRAM(s) Oral daily  cloNIDine 0.1 milliGRAM(s) Oral two times a day  dextrose 40% Gel 15 Gram(s) Oral once  dextrose 5%. 1000 milliLiter(s) (50 mL/Hr) IV Continuous <Continuous>  dextrose 5%. 1000 milliLiter(s) (100 mL/Hr) IV Continuous <Continuous>  dextrose 50% Injectable 25 Gram(s) IV Push once  dextrose 50% Injectable 12.5 Gram(s) IV Push once  dextrose 50% Injectable 25 Gram(s) IV Push once  famotidine    Tablet 20 milliGRAM(s) Oral daily  gabapentin 300 milliGRAM(s) Oral two times a day  glucagon  Injectable 1 milliGRAM(s) IntraMuscular once  heparin   Injectable 5000 Unit(s) SubCutaneous every 8 hours  hydrALAZINE 25 milliGRAM(s) Oral three times a day  influenza   Vaccine 0.5 milliLiter(s) IntraMuscular once  insulin lispro (HumaLOG) Pump 1 Each SubCutaneous Continuous Pump  metoprolol tartrate 75 milliGRAM(s) Oral two times a day  piperacillin/tazobactam IVPB.. 3.375 Gram(s) IV Intermittent every 8 hours  sodium chloride 0.9%. 1000 milliLiter(s) (80 mL/Hr) IV Continuous <Continuous>  tacrolimus 1.5 milliGRAM(s) Oral two times a day    MEDICATIONS  (PRN):      Allergies    No Known Allergies    Intolerances        REVIEW OF SYSTEMS:    All other review of systems is negative unless indicated above    VITAL SIGNS:   Vital Signs Last 24 Hrs  T(C): 36.4 (20 Sep 2021 12:26), Max: 36.9 (20 Sep 2021 01:22)  T(F): 97.6 (20 Sep 2021 12:26), Max: 98.5 (20 Sep 2021 05:00)  HR: 60 (20 Sep 2021 13:04) (56 - 79)  BP: 113/58 (20 Sep 2021 13:04) (101/62 - 180/96)  BP(mean): --  RR: 19 (20 Sep 2021 12:26) (18 - 20)  SpO2: 91% (20 Sep 2021 12:26) (91% - 98%)    I&O's Summary    20 Sep 2021 07:01  -  20 Sep 2021 13:53  --------------------------------------------------------  IN: 240 mL / OUT: 0 mL / NET: 240 mL        On Exam:    Constitutional: NAD, alert and oriented x 3  Lungs:  Non-labored, breath sounds are clear bilaterally, No wheezing, rales or rhonchi  Cardiovascular: RRR.  S1 and S2 positive.  No murmurs, rubs, gallops or clicks.  Distant  Gastrointestinal: Bowel Sounds present, soft, nontender.  Obese  Lymph: Right leg swollen and tende,r but improved.  Left leg with trace to 1+ edema.  No cervical lymphadenopathy.  Neurological: Alert, no focal deficits  Skin: No rashes or ulcers   Psych:  Mood & affect appropriate.    LABS: All Labs Reviewed:                        11.3   7.53  )-----------( 247      ( 20 Sep 2021 07:18 )             34.6                         12.1   9.39  )-----------( 279      ( 19 Sep 2021 22:46 )             36.8     20 Sep 2021 07:18    141    |  105    |  23     ----------------------------<  61     3.7     |  29     |  1.10   19 Sep 2021 22:46    138    |  105    |  29     ----------------------------<  102    4.2     |  27     |  1.50     Ca    8.2        20 Sep 2021 07:18  Ca    8.4        19 Sep 2021 22:46    TPro  6.8    /  Alb  2.7    /  TBili  0.5    /  DBili  x      /  AST  20     /  ALT  20     /  AlkPhos  84     20 Sep 2021 07:18  TPro  7.7    /  Alb  3.2    /  TBili  0.6    /  DBili  x      /  AST  40     /  ALT  25     /  AlkPhos  94     19 Sep 2021 22:46    PT/INR - ( 19 Sep 2021 22:46 )   PT: 12.9 sec;   INR: 1.11 ratio         PTT - ( 19 Sep 2021 22:46 )  PTT:31.8 sec      Blood Culture:         RADIOLOGY:    EKG:  NSR with first degree AVB.  No acute ST or T wave changes.

## 2021-09-20 NOTE — H&P ADULT - NSICDXPASTMEDICALHX_GEN_ALL_CORE_FT
PAST MEDICAL HISTORY:  CKD (chronic kidney disease) Renal Transplant 2013 at [facility]    CVA (cerebral vascular accident) [place] Stroke  low L body sensation  low R face sensation  decreased peripheral vision  poor balance    Diabetes mellitus Type 1 on insulin pump    Diabetic peripheral neuropathy     History of DVT (deep vein thrombosis)     Hyperlipidemia     Hypertension     Obesity (BMI 30-39.9)     Recurrent squamous cell carcinoma of skin nose, L arm    Squamous cell carcinoma of skin of left ear

## 2021-09-20 NOTE — CONSULT NOTE ADULT - PROBLEM SELECTOR RECOMMENDATION 2
CHRONIC KIDNEY DISEASE, STAGE 1: s/p kidney transplant   Serum creatinine is stable at 1.2   There is no progression.  No uremic symptoms. No evidence of  worsening  Anemia. Fluid status stable.   Will continue to avoid nephrotoxic drugs.  Patient remains asymptomatic.  Continue current therapy.

## 2021-09-20 NOTE — CONSULT NOTE ADULT - SUBJECTIVE AND OBJECTIVE BOX
Patient is a 55y old  Male who presents with a chief complaint of cellulitis (20 Sep 2021 02:17)      Reason For Consult: dm1 uncontrolled    HPI:  Patient is a 55 y old M with PMHx of CKD s/p renal transplant, CVA, DM type 1 (on insulin pump), neuropathy, DVT, HLD, Hypertension, Obesity, CARMEN (On nocturnal NIPPV), Restrictive Lung Disease, squamous cell carcinoma presents to ED for redness/swelling R leg. 3 days ago patient cut his foot, initially there was discharge, however that stopped within the first day. Patient states that he began to see swelling, redness, and felt 5/10 throbbing non-radiating pain in RLE. He has had a history of osteomyelitis before on the same foot, was treated with IV ceftriaxone. Denies chest pain, palpitations, dyspnea, headache, dizziness, abdominal pain, n/v/d. He is a renal transplant patient on anti-rejection medication - takes tacrolimus at 930AM and 930PM. Follows with nephrology Dr. Weber and states his baseline creatinine is 1.2.    In the ED   Vitals 97.2 F | HR 79 | /96-->117/64 | RR 18 | 98% RA   Labs: h/h 12.1/36.8, BUN 29, Cr 1.5, Alb 3.2, AST 40, GFR 52, Lactate 0.3  Duplex U/S- No evidence of right lower extremity deep venous thrombosis. Subcutaneous edema is visualized throughout the right calf.  CXR- no acute infiltrates on personal read  EKG: Sinus rhythm with 1st degree AV block 64 bpm, QT/QTc 406/418 (20 Sep 2021 02:17)      PAST MEDICAL & SURGICAL HISTORY:  Hypertension    Hyperlipidemia    Diabetes mellitus  Type 1 on insulin pump    CKD (chronic kidney disease)  Renal Transplant 2013 at [facility]    Diabetic peripheral neuropathy    Obesity (BMI 30-39.9)    CVA (cerebral vascular accident)  Wallenberg Stroke  low L body sensation  low R face sensation  decreased peripheral vision  poor balance    Squamous cell carcinoma of skin of left ear    History of DVT (deep vein thrombosis)    Recurrent squamous cell carcinoma of skin  nose, L arm    Toe infection  2007 L 4th Toe surgery-amputation secondary to osteomyelitis    Ear disease  Left inner ear surgery, 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, Can NOT have MRI    Vasectomy status  s/p b/l  vasectomy    H/O foot surgery  2005 - right foot - bone fracture - s/p ORIF and subsequent removal of hardware    End-stage renal failure with renal transplant  12/2013    S/P kidney transplant    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        FAMILY HISTORY:  Family history of diabetes mellitus (DM)    FH: skin cancer          Social History:    MEDICATIONS  (STANDING):  aspirin enteric coated 81 milliGRAM(s) Oral daily  atorvastatin 10 milliGRAM(s) Oral at bedtime  azaTHIOprine 100 milliGRAM(s) Oral daily  cloNIDine 0.1 milliGRAM(s) Oral two times a day  dextrose 40% Gel 15 Gram(s) Oral once  dextrose 5%. 1000 milliLiter(s) (50 mL/Hr) IV Continuous <Continuous>  dextrose 5%. 1000 milliLiter(s) (100 mL/Hr) IV Continuous <Continuous>  dextrose 50% Injectable 25 Gram(s) IV Push once  dextrose 50% Injectable 12.5 Gram(s) IV Push once  dextrose 50% Injectable 25 Gram(s) IV Push once  famotidine    Tablet 20 milliGRAM(s) Oral daily  gabapentin 300 milliGRAM(s) Oral two times a day  glucagon  Injectable 1 milliGRAM(s) IntraMuscular once  heparin   Injectable 5000 Unit(s) SubCutaneous every 8 hours  hydrALAZINE 25 milliGRAM(s) Oral three times a day  influenza   Vaccine 0.5 milliLiter(s) IntraMuscular once  insulin lispro (HumaLOG) Pump 1 Each SubCutaneous Continuous Pump  metoprolol tartrate 75 milliGRAM(s) Oral two times a day  piperacillin/tazobactam IVPB.. 3.375 Gram(s) IV Intermittent every 8 hours  tacrolimus 1.5 milliGRAM(s) Oral two times a day    MEDICATIONS  (PRN):        T(C): 36.6 (09-20-21 @ 06:45), Max: 36.9 (09-20-21 @ 01:22)  HR: 62 (09-20-21 @ 06:45) (58 - 79)  BP: 116/61 (09-20-21 @ 06:45) (101/62 - 180/96)  RR: 18 (09-20-21 @ 06:45) (18 - 20)  SpO2: 93% (09-20-21 @ 06:45) (93% - 98%)  Wt(kg): --    PHYSICAL EXAM:  CHEST/LUNG: Clear to percussion bilaterally; No rales, rhonchi, wheezing, or rubs  HEART: Regular rate and rhythm; No murmurs, rubs, or gallops  ABDOMEN: Soft, Nontender, Nondistended; Bowel sounds present  EXTREMITIES:  r le erythema    CAPILLARY BLOOD GLUCOSE      POCT Blood Glucose.: 109 mg/dL (20 Sep 2021 07:33)  POCT Blood Glucose.: 75 mg/dL (20 Sep 2021 05:58)  POCT Blood Glucose.: 82 mg/dL (20 Sep 2021 04:21)                            11.3   7.53  )-----------( 247      ( 20 Sep 2021 07:18 )             34.6       CMP:  09-19 @ 22:46  SGPT 25  Albumin 3.2   Alk Phos 94   Anion Gap 6   SGOT 40   Total Bili 0.6   BUN 29   Calcium Total 8.4   CO2 27   Chloride 105   Creatinine 1.50   eGFR if AA 60   eGFR if non AA 52   Glucose 102   Potassium 4.2   Protein 7.7   Sodium 138      Thyroid Function Tests:      Diabetes Tests:       Radiology:                EKG

## 2021-09-20 NOTE — PROGRESS NOTE ADULT - SUBJECTIVE AND OBJECTIVE BOX
Patient is a 55y old  Male who presents with a chief complaint of cellulitis (20 Sep 2021 13:53)      INTERVAL HPI/OVERNIGHT EVENTS: Patient seen and examined at the bedside. c/o wound on lateral aspect of R foot with R leg swelling. Wound is moist with liquid oozing out. Skin around the wound is soggy, appears white with necrotic tissue. Reports pain is much improved and swelling is way better then before. Inially he felt leg was more swollen with feeling of tightness, now its improving. Denies recent falls or dog bite.   Denies fever, cough, sob, chest pain, muscle weakness or loss of sensation. Denies n/v/d/c.    MEDICATIONS  (STANDING):  aspirin enteric coated 81 milliGRAM(s) Oral daily  atorvastatin 10 milliGRAM(s) Oral at bedtime  azaTHIOprine 100 milliGRAM(s) Oral daily  cloNIDine 0.1 milliGRAM(s) Oral two times a day  dextrose 40% Gel 15 Gram(s) Oral once  dextrose 5%. 1000 milliLiter(s) (50 mL/Hr) IV Continuous <Continuous>  dextrose 5%. 1000 milliLiter(s) (100 mL/Hr) IV Continuous <Continuous>  dextrose 50% Injectable 25 Gram(s) IV Push once  dextrose 50% Injectable 12.5 Gram(s) IV Push once  dextrose 50% Injectable 25 Gram(s) IV Push once  famotidine    Tablet 20 milliGRAM(s) Oral daily  gabapentin 300 milliGRAM(s) Oral two times a day  glucagon  Injectable 1 milliGRAM(s) IntraMuscular once  heparin   Injectable 5000 Unit(s) SubCutaneous every 8 hours  hydrALAZINE 25 milliGRAM(s) Oral three times a day  influenza   Vaccine 0.5 milliLiter(s) IntraMuscular once  insulin lispro (HumaLOG) Pump 1 Each SubCutaneous Continuous Pump  metoprolol tartrate 75 milliGRAM(s) Oral two times a day  piperacillin/tazobactam IVPB.. 3.375 Gram(s) IV Intermittent every 8 hours  sodium chloride 0.9%. 1000 milliLiter(s) (80 mL/Hr) IV Continuous <Continuous>  tacrolimus 1.5 milliGRAM(s) Oral two times a day    MEDICATIONS  (PRN):      Allergies    No Known Allergies    Intolerances        REVIEW OF SYSTEMS:  CONSTITUTIONAL: No fever or chills  HEENT:  No headache, no sore throat  RESPIRATORY: No cough, wheezing, or shortness of breath  CARDIOVASCULAR: No chest pain, palpitations  GASTROINTESTINAL: No abd pain, nausea, vomiting, or diarrhea  GENITOURINARY: No dysuria, frequency, or hematuria  NEUROLOGICAL: no focal weakness or dizziness  MUSCULOSKELETAL: R wound on the foot with leg swelling, no myalgias.    Vital Signs Last 24 Hrs  T(C): 36.4 (20 Sep 2021 12:26), Max: 36.9 (20 Sep 2021 01:22)  T(F): 97.6 (20 Sep 2021 12:26), Max: 98.5 (20 Sep 2021 05:00)  HR: 60 (20 Sep 2021 13:04) (56 - 79)  BP: 113/58 (20 Sep 2021 13:04) (101/62 - 180/96)  BP(mean): --  RR: 19 (20 Sep 2021 12:26) (18 - 20)  SpO2: 91% (20 Sep 2021 12:26) (91% - 98%)    PHYSICAL EXAM:  GENERAL: NAD, alert and oriented   HEENT:  anicteric, moist mucous membranes  CHEST/LUNG:  CTA b/l, no rales, wheezes, or rhonchi  HEART:  RRR, S1, S2, no murmurs  ABDOMEN:  BS+, soft, nontender, nondistended  EXTREMITIES: R foot wound with R leg swelling, s/p partial amputation of the right 2nd and 3rd digit, s/p partial left 4th digit amputation, s/p partial amputation of the left 2nd digit due to hx of osteomyelitis on the same leg. No pain upon palpation of the lateral and plantar aspect of the right foot, negative Homans sign,  pulses faint R,  no weakness.  NERVOUS SYSTEM: answers questions and follows commands appropriately  SKIN: Grade 3 wound along the lateral aspect of the right foot- size 6.0cmx11.0(extending from the lateral aspect to plantarly.  Diffuse maceration and diffuse hyperkeratosis with fluctuance, edema and erythema, no malodor    LABS:                        11.3   7.53  )-----------( 247      ( 20 Sep 2021 07:18 )             34.6     CBC Full  -  ( 20 Sep 2021 07:18 )  WBC Count : 7.53 K/uL  Hemoglobin : 11.3 g/dL  Hematocrit : 34.6 %  Platelet Count - Automated : 247 K/uL  Mean Cell Volume : 88.0 fl  Mean Cell Hemoglobin : 28.8 pg  Mean Cell Hemoglobin Concentration : 32.7 gm/dL  Auto Neutrophil # : 5.20 K/uL  Auto Lymphocyte # : 1.18 K/uL  Auto Monocyte # : 0.79 K/uL  Auto Eosinophil # : 0.27 K/uL  Auto Basophil # : 0.06 K/uL  Auto Neutrophil % : 69.0 %  Auto Lymphocyte % : 15.7 %  Auto Monocyte % : 10.5 %  Auto Eosinophil % : 3.6 %  Auto Basophil % : 0.8 %    20 Sep 2021 07:18    141    |  105    |  23     ----------------------------<  61     3.7     |  29     |  1.10     Ca    8.2        20 Sep 2021 07:18    TPro  6.8    /  Alb  2.7    /  TBili  0.5    /  DBili  x      /  AST  20     /  ALT  20     /  AlkPhos  84     20 Sep 2021 07:18    PT/INR - ( 19 Sep 2021 22:46 )   PT: 12.9 sec;   INR: 1.11 ratio         PTT - ( 19 Sep 2021 22:46 )  PTT:31.8 sec    CAPILLARY BLOOD GLUCOSE      POCT Blood Glucose.: 154 mg/dL (20 Sep 2021 12:09)  POCT Blood Glucose.: 109 mg/dL (20 Sep 2021 07:33)  POCT Blood Glucose.: 75 mg/dL (20 Sep 2021 05:58)  POCT Blood Glucose.: 82 mg/dL (20 Sep 2021 04:21)          RADIOLOGY & ADDITIONAL TESTS:  < from: CT Foot w/ IV Cont, Right (09.20.21 @ 11:50) >  IMPRESSION:    No CT evidence of osteomyelitis, particularly of the proximal fifth metatarsal.  Lateral soft tissue wound at the level of the fifth metatarsal without discrete fluid collection.    If clinical suspicion persists for osteomyelitis, MRI or nuclear imaging can be performed if able.    < end of copied text >  < from: Xray Foot AP + Lateral + Oblique, Right (09.20.21 @ 04:39) >  IMPRESSION:    No acute displaced fracture seen. Postsurgical changes within the medial aspect of the navicular.    Status post amputation of the second and third digits to the level of the middle phalanges. No definite cortical erosion or periostitis at the amputation margins to suggest osteomyelitis.    Questionable ulcer overlying the lateral aspect of the foot. No definite cortical erosion or periostitis to suggest osteomyelitis.    However, if there is further clinical concern for osteomyelitis, recommend further evaluation with MRI of the foot, as this is a more sensitive test to evaluate for osteomyelitis.    Soft tissue swelling overlying the medial and lateral aspect of the hindfoot/midfoot.    < end of copied text >    Personally reviewed.     Consultant(s) Notes Reviewed:  [x] YES  [ ] NO

## 2021-09-20 NOTE — CONSULT NOTE ADULT - PROBLEM SELECTOR RECOMMENDATION 4
send blood and urine cx,serial lactate levels,monitor vitals elham,ivfs hydration,monitor urine output and renal profile,iv abx as per id cons  piperacillin/tazobactam IVPB.. 3.375 Gram(s) IV Intermittent every 8 hours

## 2021-09-20 NOTE — CONSULT NOTE ADULT - SUBJECTIVE AND OBJECTIVE BOX
HPI:  Patient is a 55 y old M with PMHx of CKD s/p renal transplant, CVA, DM type 1 (on insulin pump), neuropathy, DVT, HLD, Hypertension, Obesity, CARMEN (On nocturnal NIPPV), Restrictive Lung Disease, squamous cell carcinoma presents to ED for redness/swelling R leg. 3 days ago patient cut his foot, initially there was discharge, however that stopped within the first day. Patient states that he began to see swelling, redness, and felt 5/10 throbbing non-radiating pain in RLE. He has had a history of osteomyelitis before on the same foot, was treated with IV ceftriaxone. Denies chest pain, palpitations, dyspnea, headache, dizziness, abdominal pain, n/v/d. He is a renal transplant patient on anti-rejection medication - takes tacrolimus at 930AM and 930PM. Follows with nephrology Dr. Weber and states his baseline creatinine is 1.2.    In the ED   Vitals 97.2 F | HR 79 | /96-->117/64 | RR 18 | 98% RA   Labs: h/h 12.1/36.8, BUN 29, Cr 1.5, Alb 3.2, AST 40, GFR 52, Lactate 0.3  Duplex U/S- No evidence of right lower extremity deep venous thrombosis. Subcutaneous edema is visualized throughout the right calf.  CXR- no acute infiltrates on personal read  EKG: Sinus rhythm with 1st degree AV block 64 bpm, QT/QTc 406/418 (20 Sep 2021 02:17)      55y year old Male seen at [facility] TELE 303 W1 for Grade 3 wound along the lateral aspect of the right foot secondary to trauma via cutting with the side of wooden table leg, DOI 9/16/2021. The patient states that he has a history of multiple partial amputations on the right and the left foot. The patient latest amputation was on the right foot in march 2021. The patient is an established patient at the [facility] wound care center/hyperbaric center. The patient states that he tried to make an appointment but was told that it is two weeks away. The patient states that he noticed the increased redness and swelling along his RLE over the weekend and decided to come into the ED. The patient states that he  is neuroathic but the RLE starting becoming painful with a throbbing pain. The patient also worked as a  before he retired. Denies any fever, chills, nausea, vomiting, chest pain, shortness of breath, or calf pain at this time.    REVIEW OF SYSTEMS    PAST MEDICAL & SURGICAL HISTORY:  Hypertension    Hyperlipidemia    Diabetes mellitus  Type 1 on insulin pump    CKD (chronic kidney disease)  Renal Transplant 2013 at [facility]    Diabetic peripheral neuropathy    Obesity (BMI 30-39.9)    CVA (cerebral vascular accident)  Wallenberg Stroke  low L body sensation  low R face sensation  decreased peripheral vision  poor balance    Squamous cell carcinoma of skin of left ear    History of DVT (deep vein thrombosis)    Recurrent squamous cell carcinoma of skin  nose, L arm    Toe infection  2007 L 4th Toe surgery-amputation secondary to osteomyelitis    Ear disease  Left inner ear surgery, 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, Can NOT have MRI    Vasectomy status  s/p b/l  vasectomy    H/O foot surgery  2005 - right foot - bone fracture - s/p ORIF and subsequent removal of hardware    End-stage renal failure with renal transplant  12/2013    S/P kidney transplant    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 1000 milliLiter(s) (50 mL/Hr) IV Continuous <Continuous>  dextrose 5%. 1000 milliLiter(s) (100 mL/Hr) IV Continuous <Continuous>  dextrose 50% Injectable 25 Gram(s) IV Push once  dextrose 50% Injectable 12.5 Gram(s) IV Push once  dextrose 50% Injectable 25 Gram(s) IV Push once  famotidine    Tablet 20 milliGRAM(s) Oral daily  gabapentin 300 milliGRAM(s) Oral two times a day  glucagon  Injectable 1 milliGRAM(s) IntraMuscular once  heparin   Injectable 5000 Unit(s) SubCutaneous every 8 hours  hydrALAZINE 25 milliGRAM(s) Oral three times a day  influenza   Vaccine 0.5 milliLiter(s) IntraMuscular once  insulin lispro (HumaLOG) Pump 1 Each SubCutaneous Continuous Pump  metoprolol tartrate 75 milliGRAM(s) Oral two times a day  piperacillin/tazobactam IVPB.. 3.375 Gram(s) IV Intermittent every 8 hours  tacrolimus 1.5 milliGRAM(s) Oral two times a day    MEDICATIONS  (PRN):      Social History:  martial status: , lives with wife and 3 kids  Etoh- social drinker  tobacco- never smoker  recreational drug use- denies use  independent in ADLs, walks without assistance (20 Sep 2021 02:17)      FAMILY HISTORY:  Family history of diabetes mellitus (DM)    FH: skin cancer        Vital Signs Last 24 Hrs  T(C): 36.6 (20 Sep 2021 06:45), Max: 36.9 (20 Sep 2021 01:22)  T(F): 97.8 (20 Sep 2021 06:45), Max: 98.5 (20 Sep 2021 05:00)  HR: 62 (20 Sep 2021 06:45) (58 - 79)  BP: 116/61 (20 Sep 2021 06:45) (101/62 - 180/96)  BP(mean): --  RR: 18 (20 Sep 2021 06:45) (18 - 20)  SpO2: 93% (20 Sep 2021 06:45) (93% - 98%)    PHYSICAL EXAM:  Vascular: DP faintly palpable on the right, DP non palpable on the left  & PT nonpalpable bilaterally, Capillary refill 3 seconds, no digital hair present bilaterally, skin temperature on the right foot slightly warmer compared to the contralateral   Neurological: Loss of protective sensation b/l   Musculoskeletal: 5/5 strength in all quadrants bilaterally, AJ & STJ ROM intact, s/p partial amputation of the right 2nd and 3rd digit unk/unk, s/p partial left 4th ray amputation, s/p partial am  Dermatological: ---------- cm ulceration noted to the ----------, granular wound bed, no probe to bone, no periwound erythema, no fluctuance, no malodor, no proximal streaking at this time    CBC Full  -  ( 20 Sep 2021 07:18 )  WBC Count : 7.53 K/uL  RBC Count : 3.93 M/uL  Hemoglobin : 11.3 g/dL  Hematocrit : 34.6 %  Platelet Count - Automated : 247 K/uL  Mean Cell Volume : 88.0 fl  Mean Cell Hemoglobin : 28.8 pg  Mean Cell Hemoglobin Concentration : 32.7 gm/dL  Auto Neutrophil # : 5.20 K/uL  Auto Lymphocyte # : 1.18 K/uL  Auto Monocyte # : 0.79 K/uL  Auto Eosinophil # : 0.27 K/uL  Auto Basophil # : 0.06 K/uL  Auto Neutrophil % : 69.0 %  Auto Lymphocyte % : 15.7 %  Auto Monocyte % : 10.5 %  Auto Eosinophil % : 3.6 %  Auto Basophil % : 0.8 %      ----------CHEM PANEL----------    PT/INR - ( 19 Sep 2021 22:46 )   PT: 12.9 sec;   INR: 1.11 ratio         PTT - ( 19 Sep 2021 22:46 )  PTT:31.8 sec        Imaging: ----------   HPI:  Patient is a 55 y old M with PMHx of CKD s/p renal transplant, CVA, DM type 1 (on insulin pump), neuropathy, DVT, HLD, Hypertension, Obesity, CARMEN (On nocturnal NIPPV), Restrictive Lung Disease, squamous cell carcinoma presents to ED for redness/swelling R leg. 3 days ago patient cut his foot, initially there was discharge, however that stopped within the first day. Patient states that he began to see swelling, redness, and felt 5/10 throbbing non-radiating pain in RLE. He has had a history of osteomyelitis before on the same foot, was treated with IV ceftriaxone. Denies chest pain, palpitations, dyspnea, headache, dizziness, abdominal pain, n/v/d. He is a renal transplant patient on anti-rejection medication - takes tacrolimus at 930AM and 930PM. Follows with nephrology Dr. Weber and states his baseline creatinine is 1.2.    In the ED   Vitals 97.2 F | HR 79 | /96-->117/64 | RR 18 | 98% RA   Labs: h/h 12.1/36.8, BUN 29, Cr 1.5, Alb 3.2, AST 40, GFR 52, Lactate 0.3  Duplex U/S- No evidence of right lower extremity deep venous thrombosis. Subcutaneous edema is visualized throughout the right calf.  CXR- no acute infiltrates on personal read  EKG: Sinus rhythm with 1st degree AV block 64 bpm, QT/QTc 406/418 (20 Sep 2021 02:17)      55y year old Male seen at [facility] TELE 303 W1 for Grade 3 wound along the lateral aspect of the right foot secondary to trauma via cutting with the side of wooden table leg, DOI 9/16/2021. The patient states that he has a history of multiple partial amputations on the right and the left foot. The patient latest amputation was on the right foot in march 2021. The patient is an established patient at the [place] wound care center/hyperbaric center. The patient states that he tried to make an appointment but was told that it is two weeks away. The patient states that he noticed the increased redness and swelling along his RLE over the weekend and decided to come into the ED. The patient states that he  is neuroathic but the RLE starting becoming painful with a throbbing pain. The patient also worked as a  before he retired. Denies any fever, chills, nausea, vomiting, chest pain, shortness of breath, or calf pain at this time.    REVIEW OF SYSTEMS    PAST MEDICAL & SURGICAL HISTORY:  Hypertension    Hyperlipidemia    Diabetes mellitus  Type 1 on insulin pump    CKD (chronic kidney disease)  Renal Transplant 2013 at [facility]    Diabetic peripheral neuropathy    Obesity (BMI 30-39.9)    CVA (cerebral vascular accident)  Wallenberg Stroke  low L body sensation  low R face sensation  decreased peripheral vision  poor balance    Squamous cell carcinoma of skin of left ear    History of DVT (deep vein thrombosis)    Recurrent squamous cell carcinoma of skin  nose, L arm    Toe infection  2007 L 4th Toe surgery-amputation secondary to osteomyelitis    Ear disease  Left inner ear surgery, 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, Can NOT have MRI    Vasectomy status  s/p b/l  vasectomy    H/O foot surgery  2005 - right foot - bone fracture - s/p ORIF and subsequent removal of hardware    End-stage renal failure with renal transplant  12/2013    S/P kidney transplant    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 1000 milliLiter(s) (50 mL/Hr) IV Continuous <Continuous>  dextrose 5%. 1000 milliLiter(s) (100 mL/Hr) IV Continuous <Continuous>  dextrose 50% Injectable 25 Gram(s) IV Push once  dextrose 50% Injectable 12.5 Gram(s) IV Push once  dextrose 50% Injectable 25 Gram(s) IV Push once  famotidine    Tablet 20 milliGRAM(s) Oral daily  gabapentin 300 milliGRAM(s) Oral two times a day  glucagon  Injectable 1 milliGRAM(s) IntraMuscular once  heparin   Injectable 5000 Unit(s) SubCutaneous every 8 hours  hydrALAZINE 25 milliGRAM(s) Oral three times a day  influenza   Vaccine 0.5 milliLiter(s) IntraMuscular once  insulin lispro (HumaLOG) Pump 1 Each SubCutaneous Continuous Pump  metoprolol tartrate 75 milliGRAM(s) Oral two times a day  piperacillin/tazobactam IVPB.. 3.375 Gram(s) IV Intermittent every 8 hours  tacrolimus 1.5 milliGRAM(s) Oral two times a day    MEDICATIONS  (PRN):      Social History:  martial status: , lives with wife and 3 kids  Etoh- social drinker  tobacco- never smoker  recreational drug use- denies use  independent in ADLs, walks without assistance (20 Sep 2021 02:17)      FAMILY HISTORY:  Family history of diabetes mellitus (DM)    FH: skin cancer        Vital Signs Last 24 Hrs  T(C): 36.6 (20 Sep 2021 06:45), Max: 36.9 (20 Sep 2021 01:22)  T(F): 97.8 (20 Sep 2021 06:45), Max: 98.5 (20 Sep 2021 05:00)  HR: 62 (20 Sep 2021 06:45) (58 - 79)  BP: 116/61 (20 Sep 2021 06:45) (101/62 - 180/96)  BP(mean): --  RR: 18 (20 Sep 2021 06:45) (18 - 20)  SpO2: 93% (20 Sep 2021 06:45) (93% - 98%)    PHYSICAL EXAM:  Vascular: DP faintly palpable on the right, DP non palpable on the left  & PT nonpalpable bilaterally, Capillary refill 3 seconds, no digital hair present bilaterally, skin temperature on the right foot slightly warmer compared to the contralateral   Neurological: Loss of protective sensation b/l   Musculoskeletal: 5/5 strength in all quadrants bilaterally, AJ & STJ ROM intact, s/p partial amputation of the right 2nd and 3rd digit unk/unk, s/p partial left 4th ray amputation, s/p partial amputation of the left 2nd digit, no pain upon palpation of the lateral and plantar aspect of the right foot, negative lillie's sign   Dermatological:   1. Grade 3 wound along the lateral aspect of the right foot- size 6.0cmx11.0(extending from the lateral aspect to plantarly)- diffuse maceration and diffuse hyperkeratosis with fluctuance, edema and erythema, no malodor    CBC Full  -  ( 20 Sep 2021 07:18 )  WBC Count : 7.53 K/uL  RBC Count : 3.93 M/uL  Hemoglobin : 11.3 g/dL  Hematocrit : 34.6 %  Platelet Count - Automated : 247 K/uL  Mean Cell Volume : 88.0 fl  Mean Cell Hemoglobin : 28.8 pg  Mean Cell Hemoglobin Concentration : 32.7 gm/dL  Auto Neutrophil # : 5.20 K/uL  Auto Lymphocyte # : 1.18 K/uL  Auto Monocyte # : 0.79 K/uL  Auto Eosinophil # : 0.27 K/uL  Auto Basophil # : 0.06 K/uL  Auto Neutrophil % : 69.0 %  Auto Lymphocyte % : 15.7 %  Auto Monocyte % : 10.5 %  Auto Eosinophil % : 3.6 %  Auto Basophil % : 0.8 %      ----------CHEM PANEL----------    PT/INR - ( 19 Sep 2021 22:46 )   PT: 12.9 sec;   INR: 1.11 ratio         PTT - ( 19 Sep 2021 22:46 )  PTT:31.8 sec        Imaging: ----------   HPI:  Patient is a 55 y old M with PMHx of CKD s/p renal transplant, CVA, DM type 1 (on insulin pump), neuropathy, DVT, HLD, Hypertension, Obesity, CARMEN (On nocturnal NIPPV), Restrictive Lung Disease, squamous cell carcinoma presents to ED for redness/swelling R leg. 3 days ago patient cut his foot, initially there was discharge, however that stopped within the first day. Patient states that he began to see swelling, redness, and felt 5/10 throbbing non-radiating pain in RLE. He has had a history of osteomyelitis before on the same foot, was treated with IV ceftriaxone. Denies chest pain, palpitations, dyspnea, headache, dizziness, abdominal pain, n/v/d. He is a renal transplant patient on anti-rejection medication - takes tacrolimus at 930AM and 930PM. Follows with nephrology Dr. Weber and states his baseline creatinine is 1.2.    In the ED   Vitals 97.2 F | HR 79 | /96-->117/64 | RR 18 | 98% RA   Labs: h/h 12.1/36.8, BUN 29, Cr 1.5, Alb 3.2, AST 40, GFR 52, Lactate 0.3  Duplex U/S- No evidence of right lower extremity deep venous thrombosis. Subcutaneous edema is visualized throughout the right calf.  CXR- no acute infiltrates on personal read  EKG: Sinus rhythm with 1st degree AV block 64 bpm, QT/QTc 406/418 (20 Sep 2021 02:17)      55y year old Male seen at [facility] TELE 303 W1 for Grade 3 wound along the lateral aspect of the right foot secondary to trauma via cutting with the side of wooden table leg, DOI 9/16/2021. The patient states that he has a history of multiple partial amputations on the right and the left foot. The patient latest amputation was on the right foot in march 2021. The patient is an established patient at the [place] wound care center/hyperbaric center. The patient states that he tried to make an appointment but was told that it is two weeks away. The patient states that he noticed the increased redness and swelling along his RLE over the weekend and decided to come into the ED. The patient states that he  is neuropathic but the RLE starting becoming painful with a throbbing pain. The patient also worked as a  before he retired. Denies any fever, chills, nausea, vomiting, chest pain, shortness of breath, or calf pain at this time.    REVIEW OF SYSTEMS   Review of Systems:  Review of Systems: CONSTITUTIONAL: No weakness, fevers or chills  EYES/ENT: No visual changes;  No vertigo or throat pain   NECK: No pain or stiffness  RESPIRATORY: No cough, No shortness of breath  CARDIOVASCULAR: No chest pain or palpitations  GASTROINTESTINAL: No abdominal or epigastric pain. No nausea, vomiting, or hematemesis; No diarrhea or constipation. No melena or hematochezia.  GENITOURINARY: No dysuria, frequency or hematuria  NEUROLOGICAL: L sided body sensations decreased, R sided facial sensation decreased, decreased peripheral vision, decreased balance all due to Wallenberg Stroke 2015. Unchanged from new baseline  SKIN: Painful throbbing rash on RLE      Physical Exam:   Physical Exam: T(C): 36.9 (09-20-21 @ 01:22), Max: 36.9 (09-20-21 @ 01:22)  HR: 60 (09-20-21 @ 01:22) (60 - 79)  BP: 117/64 (09-20-21 @ 01:22) (117/64 - 180/96)  RR: 20 (09-20-21 @ 01:22) (18 - 20)  SpO2: 94% (09-20-21 @ 01:22) (94% - 98%)    GENERAL: NAD, lying in bed comfortably  HEAD:  Atraumatic, normocephalic  EYES: EOMI, PERRLA, conjunctiva and sclera clear  ENT: Moist mucous membranes  NECK: Supple, no JVD  HEART: Regular rate and rhythm, no murmurs, rubs, or gallops  LUNGS: Unlabored respirations.  Clear to auscultation bilaterally, no crackles, wheezing, or rhonchi  ABDOMEN: Soft, nontender, nondistended, +BS  EXTREMITIES: 2+ pedal pulses, no cyanosis or clubbing. Pitting edema present b/l, open wound on the lateral aspect of R foot. Erythema of RLE. Edema of RLE>LLE. Small scabs present on lower aspect of RLE  NERVOUS SYSTEM:  A&Ox3, decreased sensation of L side body, R side face  SKIN: Erythema and edema of RLE, open wound, foul smelling      PAST MEDICAL & SURGICAL HISTORY:  Hypertension    Hyperlipidemia    Diabetes mellitus  Type 1 on insulin pump    CKD (chronic kidney disease)  Renal Transplant 2013 at [facility]    Diabetic peripheral neuropathy    Obesity (BMI 30-39.9)    CVA (cerebral vascular accident)  Wallenberg Stroke  low L body sensation  low R face sensation  decreased peripheral vision  poor balance    Squamous cell carcinoma of skin of left ear    History of DVT (deep vein thrombosis)    Recurrent squamous cell carcinoma of skin  nose, L arm    Toe infection  2007 L 4th Toe surgery-amputation secondary to osteomyelitis    Ear disease  Left inner ear surgery, 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, Can NOT have MRI    Vasectomy status  s/p b/l  vasectomy    H/O foot surgery  2005 - right foot - bone fracture - s/p ORIF and subsequent removal of hardware    End-stage renal failure with renal transplant  12/2013    S/P kidney transplant    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 1000 milliLiter(s) (50 mL/Hr) IV Continuous <Continuous>  dextrose 5%. 1000 milliLiter(s) (100 mL/Hr) IV Continuous <Continuous>  dextrose 50% Injectable 25 Gram(s) IV Push once  dextrose 50% Injectable 12.5 Gram(s) IV Push once  dextrose 50% Injectable 25 Gram(s) IV Push once  famotidine    Tablet 20 milliGRAM(s) Oral daily  gabapentin 300 milliGRAM(s) Oral two times a day  glucagon  Injectable 1 milliGRAM(s) IntraMuscular once  heparin   Injectable 5000 Unit(s) SubCutaneous every 8 hours  hydrALAZINE 25 milliGRAM(s) Oral three times a day  influenza   Vaccine 0.5 milliLiter(s) IntraMuscular once  insulin lispro (HumaLOG) Pump 1 Each SubCutaneous Continuous Pump  metoprolol tartrate 75 milliGRAM(s) Oral two times a day  piperacillin/tazobactam IVPB.. 3.375 Gram(s) IV Intermittent every 8 hours  tacrolimus 1.5 milliGRAM(s) Oral two times a day    MEDICATIONS  (PRN):      Social History:  martial status: , lives with wife and 3 kids  Etoh- social drinker  tobacco- never smoker  recreational drug use- denies use  independent in ADLs, walks without assistance (20 Sep 2021 02:17)      FAMILY HISTORY:  Family history of diabetes mellitus (DM)    FH: skin cancer        Vital Signs Last 24 Hrs  T(C): 36.6 (20 Sep 2021 06:45), Max: 36.9 (20 Sep 2021 01:22)  T(F): 97.8 (20 Sep 2021 06:45), Max: 98.5 (20 Sep 2021 05:00)  HR: 62 (20 Sep 2021 06:45) (58 - 79)  BP: 116/61 (20 Sep 2021 06:45) (101/62 - 180/96)  BP(mean): --  RR: 18 (20 Sep 2021 06:45) (18 - 20)  SpO2: 93% (20 Sep 2021 06:45) (93% - 98%)    PHYSICAL EXAM:  Vascular: DP faintly palpable on the right, DP non palpable on the left  & PT nonpalpable bilaterally, Capillary refill 3 seconds, no digital hair present bilaterally, skin temperature on the right foot slightly warmer compared to the contralateral   Neurological: Loss of protective sensation b/l   Musculoskeletal: 5/5 strength in all quadrants bilaterally, AJ & STJ ROM intact, s/p partial amputation of the right 2nd and 3rd digit unk/unk, s/p partial left 4th ray amputation, s/p partial amputation of the left 2nd digit, no pain upon palpation of the lateral and plantar aspect of the right foot, negative lillie's sign   Dermatological:   1. Grade 3 wound along the lateral aspect of the right foot- size 6.0cmx11.0(extending from the lateral aspect to plantarly)- diffuse maceration and diffuse hyperkeratosis with fluctuance, edema and erythema, no malodor    CBC Full  -  ( 20 Sep 2021 07:18 )  WBC Count : 7.53 K/uL  RBC Count : 3.93 M/uL  Hemoglobin : 11.3 g/dL  Hematocrit : 34.6 %  Platelet Count - Automated : 247 K/uL  Mean Cell Volume : 88.0 fl  Mean Cell Hemoglobin : 28.8 pg  Mean Cell Hemoglobin Concentration : 32.7 gm/dL  Auto Neutrophil # : 5.20 K/uL  Auto Lymphocyte # : 1.18 K/uL  Auto Monocyte # : 0.79 K/uL  Auto Eosinophil # : 0.27 K/uL  Auto Basophil # : 0.06 K/uL  Auto Neutrophil % : 69.0 %  Auto Lymphocyte % : 15.7 %  Auto Monocyte % : 10.5 %  Auto Eosinophil % : 3.6 %  Auto Basophil % : 0.8 %      ----------CHEM PANEL----------    PT/INR - ( 19 Sep 2021 22:46 )   PT: 12.9 sec;   INR: 1.11 ratio         PTT - ( 19 Sep 2021 22:46 )  PTT:31.8 sec        Imaging: ----------

## 2021-09-20 NOTE — CONSULT NOTE ADULT - SUBJECTIVE AND OBJECTIVE BOX
Patient is a 55y old  Male who presents with a chief complaint of cellulitis (20 Sep 2021 07:55)    HPI:  Patient is a 55 y old M with PMHx of CKD s/p renal transplant, CVA, DM type 1 (on insulin pump), neuropathy, DVT, HLD, Hypertension, Obesity, CARMEN (On nocturnal NIPPV), Restrictive Lung Disease, squamous cell carcinoma presents to ED for redness/swelling R leg. 3 days ago patient cut his foot, initially there was discharge, however that stopped within the first day. Patient states that he began to see swelling, redness, and felt 5/10 throbbing non-radiating pain in RLE. He has had a history of osteomyelitis before on the same foot, was treated with IV ceftriaxone. Denies chest pain, palpitations, dyspnea, headache, dizziness, abdominal pain, n/v/d. He is a renal transplant patient on anti-rejection medication - takes tacrolimus at 930AM and 930PM. Follows with nephrology Dr. Weber and states his baseline creatinine is 1.2.    In the ED   Vitals 97.2 F | HR 79 | /96-->117/64 | RR 18 | 98% RA   Labs: h/h 12.1/36.8, BUN 29, Cr 1.5, Alb 3.2, AST 40, GFR 52, Lactate 0.3  Duplex U/S- No evidence of right lower extremity deep venous thrombosis. Subcutaneous edema is visualized throughout the right calf.  CXR- no acute infiltrates on personal read  EKG: Sinus rhythm with 1st degree AV block 64 bpm, QT/QTc 406/418 (20 Sep 2021 02:17)      PAST MEDICAL HISTORY:  Hypertension    Hyperlipidemia    Diabetes mellitus    CKD (chronic kidney disease)    Diabetic peripheral neuropathy    Obesity (BMI 30-39.9)    CVA (cerebral vascular accident)    Squamous cell carcinoma of skin of left ear    History of DVT (deep vein thrombosis)    Recurrent squamous cell carcinoma of skin        PAST SURGICAL HISTORY:  Toe infection    Ear disease    Vasectomy status    H/O foot surgery    End-stage renal failure with renal transplant    S/P kidney transplant    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        FAMILY HISTORY:  Family history of diabetes mellitus (DM)    FH: skin cancer        SOCIAL HISTORY:    Allergies    No Known Allergies    Intolerances      Home Medications:  aspirin 81 mg oral tablet: 1 tab(s) orally once a day (20 Sep 2021 02:36)  azaTHIOprine 100 mg oral tablet: 1 tab(s) orally once a day (20 Sep 2021 02:36)  cloNIDine 0.1 mg oral tablet: 1 tab(s) orally 2 times a day (20 Sep 2021 02:36)  famotidine 20 mg oral tablet: 1 tab(s) orally once a day (20 Sep 2021 02:36)  gabapentin 300 mg oral capsule: 1 cap(s) orally 2 times a day (20 Sep 2021 02:36)  HumaLOG 100 units/mL injectable solution: 3 unit(s) injectable every hour via insulin pump. TDD: 72 units. (20 Sep 2021 02:36)  hydrALAZINE 25 mg oral tablet: 1 tab(s) orally 3 times a day (20 Sep 2021 02:36)  losartan 25 mg oral tablet: 1 tab(s) orally once a day (20 Sep 2021 02:36)  lovastatin 40 mg oral tablet: 1 tab(s) orally once a day (20 Sep 2021 02:36)  Metoprolol Tartrate 25 mg oral tablet: 3 tab(s) orally 2 times a day    *conf. with patient and pharmacy. 75mg dose, BID* (20 Sep 2021 02:36)  tacrolimus: 1.5 milligram(s) orally 2 times a day (20 Sep 2021 02:36)    MEDICATIONS  (STANDING):  aspirin enteric coated 81 milliGRAM(s) Oral daily  atorvastatin 10 milliGRAM(s) Oral at bedtime  azaTHIOprine 100 milliGRAM(s) Oral daily  cloNIDine 0.1 milliGRAM(s) Oral two times a day  dextrose 40% Gel 15 Gram(s) Oral once  dextrose 5%. 1000 milliLiter(s) (50 mL/Hr) IV Continuous <Continuous>  dextrose 5%. 1000 milliLiter(s) (100 mL/Hr) IV Continuous <Continuous>  dextrose 50% Injectable 25 Gram(s) IV Push once  dextrose 50% Injectable 12.5 Gram(s) IV Push once  dextrose 50% Injectable 25 Gram(s) IV Push once  famotidine    Tablet 20 milliGRAM(s) Oral daily  gabapentin 300 milliGRAM(s) Oral two times a day  glucagon  Injectable 1 milliGRAM(s) IntraMuscular once  heparin   Injectable 5000 Unit(s) SubCutaneous every 8 hours  hydrALAZINE 25 milliGRAM(s) Oral three times a day  influenza   Vaccine 0.5 milliLiter(s) IntraMuscular once  insulin lispro (HumaLOG) Pump 1 Each SubCutaneous Continuous Pump  metoprolol tartrate 75 milliGRAM(s) Oral two times a day  piperacillin/tazobactam IVPB.. 3.375 Gram(s) IV Intermittent every 8 hours  tacrolimus 1.5 milliGRAM(s) Oral two times a day    MEDICATIONS  (PRN):      REVIEW OF SYSTEMS:  General:   Respiratory: No cough, SOB  Cardiovascular: No CP or Palpitations	  Gastrointestinal: No nausea, Vomiting. No diarrhea  Genitourinary: No urinary complaints	  Musculoskeletal: No leg swelling, No new rash or lesions	  Neurological: 	  all other systems negative    T(F): 97.8 (09-20-21 @ 06:45), Max: 98.5 (09-20-21 @ 05:00)  HR: 62 (09-20-21 @ 06:45) (58 - 79)  BP: 116/61 (09-20-21 @ 06:45) (101/62 - 180/96)  RR: 18 (09-20-21 @ 06:45) (18 - 20)  SpO2: 93% (09-20-21 @ 06:45) (93% - 98%)  Wt(kg): --    PHYSICAL EXAM:  General: NAD  Respiratory: b/l air entry  Cardiovascular: S1 S2  Gastrointestinal: soft  Extremities: edema        09-20    141  |  105  |  23  ----------------------------<  61<L>  3.7   |  29  |  1.10    Ca    8.2<L>      20 Sep 2021 07:18    TPro  6.8  /  Alb  2.7<L>  /  TBili  0.5  /  DBili  x   /  AST  20  /  ALT  20  /  AlkPhos  84  09-20                          11.3   7.53  )-----------( 247      ( 20 Sep 2021 07:18 )             34.6       Potassium, Serum: 3.7 mmol/L (09-20 @ 07:18)  Blood Urea Nitrogen, Serum: 23 mg/dL (09-20 @ 07:18)  Calcium, Total Serum: 8.2 mg/dL (09-20 @ 07:18)  Hemoglobin: 11.3 g/dL (09-20 @ 07:18)      Creatinine, Serum: 1.10 (09-20 @ 07:18)  Creatinine, Serum: 1.50 (09-19 @ 22:46)        LIVER FUNCTIONS - ( 20 Sep 2021 07:18 )  Alb: 2.7 g/dL / Pro: 6.8 g/dL / ALK PHOS: 84 U/L / ALT: 20 U/L / AST: 20 U/L / GGT: x                       I&O's Detail           Patient is a 55y old  Male who presents with a chief complaint of cellulitis (20 Sep 2021 07:55)    HPI:  Patient is a 55 y old M with PMHx of CKD s/p renal transplant, CVA, DM type 1 (on insulin pump), neuropathy, DVT, HLD, Hypertension, Obesity, CARMEN (On nocturnal NIPPV), Restrictive Lung Disease, squamous cell carcinoma presents to ED for redness/swelling R leg. 3 days ago patient cut his foot, initially there was discharge, however that stopped within the first day. Patient states that he began to see swelling, redness, and felt 5/10 throbbing non-radiating pain in RLE. He has had a history of osteomyelitis before on the same foot, was treated with IV ceftriaxone. Denies chest pain, palpitations, dyspnea, headache, dizziness, abdominal pain, n/v/d. He is a renal transplant patient on anti-rejection medication - takes tacrolimus at 930AM and 930PM. Follows with nephrology Dr. Weber and states his baseline creatinine is 1.2.    In the ED   Vitals 97.2 F | HR 79 | /96-->117/64 | RR 18 | 98% RA   Labs: h/h 12.1/36.8, BUN 29, Cr 1.5, Alb 3.2, AST 40, GFR 52, Lactate 0.3  Duplex U/S- No evidence of right lower extremity deep venous thrombosis. Subcutaneous edema is visualized throughout the right calf.  CXR- no acute infiltrates on personal read  EKG: Sinus rhythm with 1st degree AV block 64 bpm, QT/QTc 406/418 (20 Sep 2021 02:17)    Renal consult called for chronic kidney disease stage 3, Kidney transplant recipient. History obtained from chart and patient.       PAST MEDICAL HISTORY:  Hypertension    Hyperlipidemia    Diabetes mellitus    CKD (chronic kidney disease)    Diabetic peripheral neuropathy    Obesity (BMI 30-39.9)    CVA (cerebral vascular accident)    Squamous cell carcinoma of skin of left ear    History of DVT (deep vein thrombosis)    Recurrent squamous cell carcinoma of skin        PAST SURGICAL HISTORY:  Toe infection    Ear disease    Vasectomy status    H/O foot surgery    End-stage renal failure with renal transplant    S/P kidney transplant    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        FAMILY HISTORY:  Family history of diabetes mellitus (DM)    FH: skin cancer        SOCIAL HISTORY: No smoking or alcohol use     Allergies    No Known Allergies    Intolerances      Home Medications:  aspirin 81 mg oral tablet: 1 tab(s) orally once a day (20 Sep 2021 02:36)  azaTHIOprine 100 mg oral tablet: 1 tab(s) orally once a day (20 Sep 2021 02:36)  cloNIDine 0.1 mg oral tablet: 1 tab(s) orally 2 times a day (20 Sep 2021 02:36)  famotidine 20 mg oral tablet: 1 tab(s) orally once a day (20 Sep 2021 02:36)  gabapentin 300 mg oral capsule: 1 cap(s) orally 2 times a day (20 Sep 2021 02:36)  HumaLOG 100 units/mL injectable solution: 3 unit(s) injectable every hour via insulin pump. TDD: 72 units. (20 Sep 2021 02:36)  hydrALAZINE 25 mg oral tablet: 1 tab(s) orally 3 times a day (20 Sep 2021 02:36)  losartan 25 mg oral tablet: 1 tab(s) orally once a day (20 Sep 2021 02:36)  lovastatin 40 mg oral tablet: 1 tab(s) orally once a day (20 Sep 2021 02:36)  Metoprolol Tartrate 25 mg oral tablet: 3 tab(s) orally 2 times a day    *conf. with patient and pharmacy. 75mg dose, BID* (20 Sep 2021 02:36)  tacrolimus: 1.5 milligram(s) orally 2 times a day (20 Sep 2021 02:36)    MEDICATIONS  (STANDING):  aspirin enteric coated 81 milliGRAM(s) Oral daily  atorvastatin 10 milliGRAM(s) Oral at bedtime  azaTHIOprine 100 milliGRAM(s) Oral daily  cloNIDine 0.1 milliGRAM(s) Oral two times a day  dextrose 40% Gel 15 Gram(s) Oral once  dextrose 5%. 1000 milliLiter(s) (50 mL/Hr) IV Continuous <Continuous>  dextrose 5%. 1000 milliLiter(s) (100 mL/Hr) IV Continuous <Continuous>  dextrose 50% Injectable 25 Gram(s) IV Push once  dextrose 50% Injectable 12.5 Gram(s) IV Push once  dextrose 50% Injectable 25 Gram(s) IV Push once  famotidine    Tablet 20 milliGRAM(s) Oral daily  gabapentin 300 milliGRAM(s) Oral two times a day  glucagon  Injectable 1 milliGRAM(s) IntraMuscular once  heparin   Injectable 5000 Unit(s) SubCutaneous every 8 hours  hydrALAZINE 25 milliGRAM(s) Oral three times a day  influenza   Vaccine 0.5 milliLiter(s) IntraMuscular once  insulin lispro (HumaLOG) Pump 1 Each SubCutaneous Continuous Pump  metoprolol tartrate 75 milliGRAM(s) Oral two times a day  piperacillin/tazobactam IVPB.. 3.375 Gram(s) IV Intermittent every 8 hours  tacrolimus 1.5 milliGRAM(s) Oral two times a day    MEDICATIONS  (PRN):      REVIEW OF SYSTEMS:  General: no distress  Respiratory: No cough, SOB  Cardiovascular: No CP or Palpitations	  Gastrointestinal: No nausea, Vomiting. No diarrhea  Genitourinary: No urinary complaints	  Musculoskeletal: No new rash or lesions	  all other systems negative    T(F): 97.8 (09-20-21 @ 06:45), Max: 98.5 (09-20-21 @ 05:00)  HR: 62 (09-20-21 @ 06:45) (58 - 79)  BP: 116/61 (09-20-21 @ 06:45) (101/62 - 180/96)  RR: 18 (09-20-21 @ 06:45) (18 - 20)  SpO2: 93% (09-20-21 @ 06:45) (93% - 98%)  Wt(kg): --    PHYSICAL EXAM:  General: NAD  Respiratory: b/l air entry  Cardiovascular: S1 S2  Gastrointestinal: soft  Extremities: + edema, Rt > left        09-20    141  |  105  |  23  ----------------------------<  61<L>  3.7   |  29  |  1.10    Ca    8.2<L>      20 Sep 2021 07:18    T Pro  6.8  /  Alb  2.7<L>  /  T Bili  0.5  /  D Bili  x   /  AST  20  /  ALT  20  /  Alk Phos  84  09-20                          11.3   7.53  )-----------( 247      ( 20 Sep 2021 07:18 )             34.6       Potassium, Serum: 3.7 mmol/L (09-20 @ 07:18)  Blood Urea Nitrogen, Serum: 23 mg/dL (09-20 @ 07:18)  Calcium, Total Serum: 8.2 mg/dL (09-20 @ 07:18)  Hemoglobin: 11.3 g/dL (09-20 @ 07:18)      Creatinine, Serum: 1.10 (09-20 @ 07:18)  Creatinine, Serum: 1.50 (09-19 @ 22:46)        LIVER FUNCTIONS - ( 20 Sep 2021 07:18 )  Alb: 2.7 g/dL / Pro: 6.8 g/dL / ALK PHOS: 84 U/L / ALT: 20 U/L / AST: 20 U/L / GGT: x               < from: Xray Chest 1 View-PORTABLE IMMEDIATE (09.19.21 @ 22:23) >    EXAM:  XR CHEST PORTABLE IMMED 1V                            PROCEDURE DATE:  09/19/2021          INTERPRETATION:  Chest portable.    Clinical History: Shortness of breath, cough.    Comparison: 2/10/2021.    Single AP view submitted.    The evaluation of the cardiomediastinal silhouette is limited on portable technique.  Loop recorder device overlies the left cardiac margin.    Low lung volumes are present.  No focal consolidation, effusion or pneumothorax is noted.    Impression:    No acute pulmonary process demonstrated.        --- End of Report ---            HELGA MORRIS MD; Attending Radiologist  This document has been electronically signed. Sep 20 2021 11:33AM    < end of copied text >

## 2021-09-20 NOTE — H&P ADULT - HISTORY OF PRESENT ILLNESS
***CHARTING IN PROGRESS*  Patient is a 55 y old M with PMH of CKD s/p  renal transplant, CVA, DM type 1 (on insulin pump), neuropathy, DVT, HLD, Hypertension ,Obesity, Restrictive lung disease (On VPAP) squamous cell carcinoma, presents to ED for redness/swelling R leg. 3 days ago patient cut his foot, initially there was discharge, however that stopped within the first day. Patient states that he began to see swelling, redness, and felt 5/10 throbbing nonradiating pain in RLE.    In the ED   Vitals 97.2 F | HR 79 | /96-->117/64 | RR 18 | 98% RA   Labs: h/h 12.1/36.8, BUN 29, Cr 1.5, Alb 3.2, AST 40, GFR 52, Lactate 0.3  Duplex U/S- No evidence of right lower extremity deep venous thrombosis. Subcutaneous edema is visualized throughout the right calf.  CXR- f/u  EKG: Sinus rhythm with 1st degree AV block 64 bpm, QT/QTc 406/418 Patient is a 55 y old M with PMH of CKD s/p  renal transplant, CVA, DM type 1 (on insulin pump), neuropathy, DVT, HLD, Hypertension ,Obesity, Restrictive lung disease (On VPAP) squamous cell carcinoma, presents to ED for redness/swelling R leg. 3 days ago patient cut his foot, initially there was discharge, however that stopped within the first day. Patient states that he began to see swelling, redness, and felt 5/10 throbbing nonradiating pain in RLE.    In the ED   Vitals 97.2 F | HR 79 | /96-->117/64 | RR 18 | 98% RA   Labs: h/h 12.1/36.8, BUN 29, Cr 1.5, Alb 3.2, AST 40, GFR 52, Lactate 0.3  Duplex U/S- No evidence of right lower extremity deep venous thrombosis. Subcutaneous edema is visualized throughout the right calf.  CXR- f/u  EKG: Sinus rhythm with 1st degree AV block 64 bpm, QT/QTc 406/418 Patient is a 55 y old M with PMHx of CKD s/p renal transplant, CVA, DM type 1 (on insulin pump), neuropathy, DVT, HLD, Hypertension, Obesity, CARMEN (On nocturnal NIPPV), Restrictive Lung Disease, squamous cell carcinoma presents to ED for redness/swelling R leg. 3 days ago patient cut his foot, initially there was discharge, however that stopped within the first day. Patient states that he began to see swelling, redness, and felt 5/10 throbbing non-radiating pain in RLE. He has had a history of osteomyelitis before on the same foot, was treated with IV ceftriaxone. Denies chest pain, palpitations, dyspnea, headache, dizziness, abdominal pain, n/v/d. He is a renal transplant patient on anti-rejection medication - takes tacrolimus at 930AM and 930PM. Follows with nephrology Dr. Weber and states his baseline creatinine is 1.2.    In the ED   Vitals 97.2 F | HR 79 | /96-->117/64 | RR 18 | 98% RA   Labs: h/h 12.1/36.8, BUN 29, Cr 1.5, Alb 3.2, AST 40, GFR 52, Lactate 0.3  Duplex U/S- No evidence of right lower extremity deep venous thrombosis. Subcutaneous edema is visualized throughout the right calf.  CXR- no acute infiltrates on personal read  EKG: Sinus rhythm with 1st degree AV block 64 bpm, QT/QTc 406/418

## 2021-09-20 NOTE — CONSULT NOTE ADULT - ASSESSMENT
Patient is a 55 y old M with PMHx of CKD s/p renal transplant, CVA, DM type 1 (on insulin pump), neuropathy, DVT, HLD, Hypertension, Obesity, CARMEN (On nocturnal NIPPV), Restrictive Lung Disease, squamous cell carcinoma presents to ED for redness/swelling R leg. 3 days ago patient cut his foot, initially there was discharge, however that stopped within the first day. Patient states that he began to see swelling, redness, and felt 5/10 throbbing non-radiating pain in RLE. He has had a history of osteomyelitis before on the same foot, was treated with IV ceftriaxone. Denies chest pain, palpitations, dyspnea, headache, dizziness, abdominal pain, n/v/d. He is a renal transplant patient on anti-rejection medication - takes tacrolimus at 930AM and 930PM. Follows with nephrology Dr. Weber and states his baseline creatinine is 1.2.    In the ED   Vitals 97.2 F | HR 79 | /96-->117/64 | RR 18 | 98% RA   Labs: h/h 12.1/36.8, BUN 29, Cr 1.5, Alb 3.2, AST 40, GFR 52, Lactate 0.3  Duplex U/S- No evidence of right lower extremity deep venous thrombosis. Subcutaneous edema is visualized throughout the right calf.  CXR- no acute infiltrates on personal read  EKG: Sinus rhythm with 1st degree AV block 64 bpm, QT/QTc 406/418 (20 Sep 2021 02:17)   Patient is a 55 y old M with PMHx of CKD s/p renal transplant, CVA, DM type 1 (on insulin pump), neuropathy, DVT, HLD, Hypertension, Obesity, CARMEN (On nocturnal NIPPV), Restrictive Lung Disease, squamous cell carcinoma presents to ED for redness/swelling R leg. 3 days ago patient cut his foot, initially there was discharge, however that stopped within the first day. Patient states that he began to see swelling, redness, and felt 5/10 throbbing non-radiating pain in RLE. He has had a history of osteomyelitis before on the same foot, was treated with IV ceftriaxone. Denies chest pain, palpitations, dyspnea, headache, dizziness, abdominal pain, n/v/d. He is a renal transplant patient on anti-rejection medication - takes tacrolimus at 930AM and 930PM. Follows with nephrology Dr. Weber and states his baseline creatinine is 1.2.    MEDICATIONS  (STANDING):  aspirin enteric coated 81 milliGRAM(s) Oral daily  atorvastatin 10 milliGRAM(s) Oral at bedtime  azaTHIOprine 100 milliGRAM(s) Oral daily  cloNIDine 0.1 milliGRAM(s) Oral two times a day  dextrose 40% Gel 15 Gram(s) Oral once  dextrose 50% Injectable 25 Gram(s) IV Push once  famotidine    Tablet 20 milliGRAM(s) Oral daily  gabapentin 300 milliGRAM(s) Oral two times a day  glucagon  Injectable 1 milliGRAM(s) IntraMuscular once  heparin   Injectable 5000 Unit(s) SubCutaneous every 8 hours  hydrALAZINE 25 milliGRAM(s) Oral three times a day  influenza   Vaccine 0.5 milliLiter(s) IntraMuscular once  insulin lispro (HumaLOG) Pump 1 Each SubCutaneous Continuous Pump  lactated ringers. 1000 milliLiter(s) (75 mL/Hr) IV Continuous <Continuous>  metoprolol tartrate 75 milliGRAM(s) Oral two times a day  piperacillin/tazobactam IVPB.. 3.375 Gram(s) IV Intermittent every 8 hours  sodium chloride 0.9%. 1000 milliLiter(s) (80 mL/Hr) IV Continuous <Continuous>  tacrolimus 1.5 milliGRAM(s) Oral two times a day

## 2021-09-20 NOTE — CONSULT NOTE ADULT - SUBJECTIVE AND OBJECTIVE BOX
full consult to f/u    Patient is a 55y Male whom presented to the hospital with     PAST MEDICAL & SURGICAL HISTORY:  Hypertension    Hyperlipidemia    Diabetes mellitus  Type 1 on insulin pump    CKD (chronic kidney disease)  Renal Transplant 2013 at [facility]    Diabetic peripheral neuropathy    Obesity (BMI 30-39.9)    CVA (cerebral vascular accident)  Wallenberg Stroke  low L body sensation  low R face sensation  decreased peripheral vision  poor balance    Squamous cell carcinoma of skin of left ear    History of DVT (deep vein thrombosis)    Recurrent squamous cell carcinoma of skin  nose, L arm    Toe infection  2007 L 4th Toe surgery-amputation secondary to osteomyelitis    Ear disease  Left inner ear surgery, 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, Can NOT have MRI    Vasectomy status  s/p b/l  vasectomy    H/O foot surgery  2005 - right foot - bone fracture - s/p ORIF and subsequent removal of hardware    End-stage renal failure with renal transplant  12/2013    S/P kidney transplant    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        MEDICATIONS  (STANDING):  aspirin enteric coated 81 milliGRAM(s) Oral daily  atorvastatin 10 milliGRAM(s) Oral at bedtime  azaTHIOprine 100 milliGRAM(s) Oral daily  cloNIDine 0.1 milliGRAM(s) Oral two times a day  dextrose 40% Gel 15 Gram(s) Oral once  dextrose 50% Injectable 25 Gram(s) IV Push once  famotidine    Tablet 20 milliGRAM(s) Oral daily  gabapentin 300 milliGRAM(s) Oral two times a day  glucagon  Injectable 1 milliGRAM(s) IntraMuscular once  heparin   Injectable 5000 Unit(s) SubCutaneous every 8 hours  hydrALAZINE 25 milliGRAM(s) Oral three times a day  influenza   Vaccine 0.5 milliLiter(s) IntraMuscular once  insulin lispro (HumaLOG) Pump 1 Each SubCutaneous Continuous Pump  lactated ringers. 1000 milliLiter(s) (75 mL/Hr) IV Continuous <Continuous>  metoprolol tartrate 75 milliGRAM(s) Oral two times a day  piperacillin/tazobactam IVPB.. 3.375 Gram(s) IV Intermittent every 8 hours  sodium chloride 0.9%. 1000 milliLiter(s) (80 mL/Hr) IV Continuous <Continuous>  tacrolimus 1.5 milliGRAM(s) Oral two times a day      Allergies    No Known Allergies    Intolerances        SOCIAL HISTORY:  Denies ETOh,Smoking,     FAMILY HISTORY:  Family history of diabetes mellitus (DM)    FH: skin cancer        REVIEW OF SYSTEMS:    CONSTITUTIONAL: No weakness, fevers or chills  RESPIRATORY: No cough, wheezing, hemoptysis; No shortness of breath  CARDIOVASCULAR: No chest pain or palpitations  GASTROINTESTINAL: No abdominal or epigastric pain. No nausea, vomiting,     No diarrhea or constipation. No melena   GENITOURINARY: No dysuria, frequency or hematuria  NEUROLOGICAL: No numbness or weakness  SKIN:   redness/swelling R leg.    VITAL:  T(C): , Max: 36.9 (09-20-21 @ 21:43)  T(F): , Max: 98.5 (09-20-21 @ 21:43)  HR: 71 (09-21-21 @ 18:20)  BP: 144/71 (09-21-21 @ 18:20)  BP(mean): --  RR: 15 (09-21-21 @ 18:20)  SpO2: 97% (09-21-21 @ 18:20)  Wt(kg): --    I and O's:    09-20 @ 07:01  -  09-21 @ 07:00  --------------------------------------------------------  IN: 2180 mL / OUT: 0 mL / NET: 2180 mL    09-21 @ 07:01  -  09-21 @ 19:12  --------------------------------------------------------  IN: 540 mL / OUT: 0 mL / NET: 540 mL      Height (cm): 185.4 (09-21 @ 13:15)  Weight (kg): 136.1 (09-21 @ 13:15)  BMI (kg/m2): 39.6 (09-21 @ 13:15)  BSA (m2): 2.56 (09-21 @ 13:15)    PHYSICAL EXAM:    Constitutional: NAD  HEENT: conjunctive   clear   Neck:  No JVD  Respiratory: CTAB  Cardiovascular: S1 and S2  Gastrointestinal: BS+, soft, NT/ND  Extremities: Erythema and edema of RLE    LABS:                        11.4   5.20  )-----------( 238      ( 21 Sep 2021 07:24 )             35.2     09-21    142  |  108  |  17  ----------------------------<  125<H>  4.4   |  31  |  1.20    Ca    8.4<L>      21 Sep 2021 07:24    TPro  6.8  /  Alb  2.7<L>  /  TBili  0.5  /  DBili  x   /  AST  20  /  ALT  20  /  AlkPhos  84  09-20      Urine Studies:          RADIOLOGY & ADDITIONAL STUDIES:

## 2021-09-20 NOTE — CONSULT NOTE ADULT - PROBLEM SELECTOR RECOMMENDATION 3
hydrALAZINE 25 milliGRAM(s) Oral three times a day  metoprolol tartrate 75 milliGRAM(s) Oral two times a day  BP monitoring,continue current antihypertensive meds, low salt diet,followup with PMD in 1-2 weeks

## 2021-09-20 NOTE — CONSULT NOTE ADULT - PROBLEM SELECTOR RECOMMENDATION 9
- patient seen and evaluated  - appear to have a soft tissue infection secondary to cut on the lateral aspect of the right foot  - patient is currently scheduled for Right Foot Incision and Drainage with pulse lavage   - please provide medical and cardiac clearance  - order CT of the right foot to r/o OM  - NPO past midnight  - will order non invasive vascular studies, patient right is non palpable PT and faintly palpable DP  - Podiatry team will continue to follow patient while in house - patient seen and evaluated  - appear to have a soft tissue infection secondary to cut on the lateral aspect of the right foot  - patient is currently scheduled for Right Foot Incision and Drainage with pulse lavage   - please provide medical and cardiac clearance  - order CT of the right foot to r/o OM  - NPO past midnight  - will order non invasive vascular studies, patient right is non palpable PT and faintly palpable DP  - area dressed with aquacel ag and dsd   - Podiatry team will continue to follow patient while in house - patient seen and evaluated  - appear to have a soft tissue infection secondary to cut on the lateral aspect of the right foot  - patient is currently scheduled for Right Foot Incision and Drainage with pulse lavage   - please provide medical and cardiac clearance  - order CT of the right foot to r/o OM  - NPO past midnight  - will order non invasive vascular studies, patient right is non palpable PT and faintly palpable DP  - area dressed with aquacel ag and dsd   - Podiatry team will continue to follow patient while in house  -Given the patient's severity of symptoms, will plan for surgical intervention at this time. Discussed risks, benefits, and potential complications with patient and family members regarding surgical intervention including sepsis, loss of limb, and loss of life. All questions answered to satisfaction at this time.  Requesting cardiology risk stratification and optimization.  Requesting medical risk stratification and optimization.

## 2021-09-20 NOTE — CONSULT NOTE ADULT - ASSESSMENT
Grade 3 wound along the Right Lateral Foot secondary to trauma of cutting wound along the wooden leg, DOI 9/16/2021     Xrays evaluated, appears to be some soft tissue fluid collection along the area where the wound resides, patient is unable to get MRI    Patient is currently scheduled for OR Right Foot Incision and Drainage with Pulse Lavage with Dr. Araiza tomorrow 9/21/2021     Will order a CT of the right foot to r/o any bony involvement or possibility of bone infection    Please provide medical and cardiac clearance for the patient     NPO past midnight tonight

## 2021-09-20 NOTE — H&P ADULT - PROBLEM SELECTOR PLAN 7
Chronic  -Patient has history of HLD  -Therapeutic interchange of home lovastatin 40 mg to atorvastatin 10 mg  - DASH/ TLC diet, consistent carb Chronic  -Patient has history of HLD  -Therapeutic interchange of home lovastatin 40 mg to atorvastatin 10 mg  -DASH/ TLC diet, consistent carb

## 2021-09-20 NOTE — CONSULT NOTE ADULT - ASSESSMENT
Patient is a 55 y old M with PMHx of CKD s/p renal transplant, CVA, s/p negative ILR monitor, never removed, DM type 1 (on insulin pump), neuropathy, DVT, HLD, Hypertension, Obesity, CAREMN (On nocturnal NIPPV), Restrictive Lung Disease, squamous cell carcinoma presents to ED for redness/swelling R leg, for I and D under local anesthesia:    - Optimized for the OR from a cardiac point of view with no evidence of active ischemic heart disease, decompensated heart failure, severe obstructive valvular disease, or uncontrolled arrhythmia.  - Continue aspirin 81 qd.  Can be held if needed for procedure  - Continue statin drug  - Continue clonidine 0.1 bid  - Continue hydralazine 25 bid  - Continue metoprolol 75 bid  - Monitor and replete lytes  - To follow closely

## 2021-09-20 NOTE — H&P ADULT - NSHPREVIEWOFSYSTEMS_GEN_ALL_CORE
REVIEW OF SYSTEMS:    CONSTITUTIONAL: No weakness, fevers or chills  EYES/ENT: No visual changes;  No vertigo or throat pain   NECK: No pain or stiffness  RESPIRATORY: No cough, wheezing, hemoptysis; No shortness of breath  CARDIOVASCULAR: No chest pain or palpitations  GASTROINTESTINAL: No abdominal or epigastric pain. No nausea, vomiting, or hematemesis; No diarrhea or constipation. No melena or hematochezia.  GENITOURINARY: No dysuria, frequency or hematuria  NEUROLOGICAL: No numbness or weakness  SKIN: No itching, rashes REVIEW OF SYSTEMS:    CONSTITUTIONAL: No weakness, fevers or chills  EYES/ENT: No visual changes;  No vertigo or throat pain   NECK: No pain or stiffness  RESPIRATORY: No cough, No shortness of breath  CARDIOVASCULAR: No chest pain or palpitations  GASTROINTESTINAL: No abdominal or epigastric pain. No nausea, vomiting, or hematemesis; No diarrhea or constipation. No melena or hematochezia.  GENITOURINARY: No dysuria, frequency or hematuria  NEUROLOGICAL: L sided body sensations decreased, R sided facial sensation decreased, decreased peripheral vision, decreased balance all due to [place] Stroke 2015. Unchanged from new baseline  SKIN: Painful throbbing rash on RLE CONSTITUTIONAL: No weakness, fevers or chills  EYES/ENT: No visual changes;  No vertigo or throat pain   NECK: No pain or stiffness  RESPIRATORY: No cough, No shortness of breath  CARDIOVASCULAR: No chest pain or palpitations  GASTROINTESTINAL: No abdominal or epigastric pain. No nausea, vomiting, or hematemesis; No diarrhea or constipation. No melena or hematochezia.  GENITOURINARY: No dysuria, frequency or hematuria  NEUROLOGICAL: L sided body sensations decreased, R sided facial sensation decreased, decreased peripheral vision, decreased balance all due to [place] Stroke 2015. Unchanged from new baseline  SKIN: Painful throbbing rash on RLE

## 2021-09-20 NOTE — H&P ADULT - PROBLEM SELECTOR PLAN 1
Patient presented to the ED with RLE below the knee swelling, erythema, pain for 3 days after having a cut on his right foot.   -admit to general medicine floor  -duplex US No evidence of right lower extremity DVT. Subcutaneous edema is visualized throughout the right calf.  -RLE edema, erythema noted below the knee with non draining wound on lateral aspect of right foot  -s/p zosyn and vancomycin in the ED   -start patient on linezolid and zosyn for now  - Patient presented to the ED with RLE below the knee swelling, erythema, pain for 3 days after having a cut on his right foot.   -admit to general medicine floor  -duplex US No evidence of right lower extremity DVT. Subcutaneous edema is visualized throughout the right calf.  -RLE edema, erythema noted below the knee with non draining wound on lateral aspect of right foot  -s/p zosyn and vancomycin in the ED   -start patient on linezolid and zosyn for now  -f/u BCx x2, UCx Patient presented to the ED with RLE below the knee swelling, erythema, pain for 3 days after having a cut on his right foot.   -admit to general medicine floor  -duplex US No evidence of right lower extremity DVT. Subcutaneous edema is visualized throughout the right calf.  -RLE edema, erythema noted below the knee with non draining wound on lateral aspect of right foot  -s/p zosyn and vancomycin in the ED   -start patient on linezolid and zosyn for now  -f/u BCx x2, UCx  -f/u foot Xray to r/o OM  -Consult Podiatry  Consult ID (Dr. Torres)- f/u recs Patient presented to the ED with RLE below the knee swelling, erythema, pain for 3 days after having a cut on his right foot.   -admit to general medicine floor  -duplex US No evidence of right lower extremity DVT. Subcutaneous edema is visualized throughout the right calf.  -RLE edema, erythema noted below the knee with non draining wound on lateral aspect of right foot  -s/p zosyn and vancomycin in the ED   -Sepsis criteria not met at this time, afebrile, lactate 0.3, procal<0.05  -start patient on linezolid and zosyn until cultures result  -f/u BCx x2, foot culture, MRSA/MSSA PCR, CRP  -Consult Podiatry  -Consult ID (Dr. Torres)- f/u recs Patient presented to the ED with RLE below the knee swelling, erythema, pain for 3 days after having a cut on his right foot.   -admit to general medicine floor  -duplex US No evidence of right lower extremity DVT. Subcutaneous edema is visualized throughout the right calf.  -RLE edema, erythema noted below the knee with non draining wound on lateral aspect of right foot  -s/p zosyn and vancomycin in the ED   -Sepsis criteria not met at this time, afebrile, lactate 0.3, procal<0.05  -Start patient on linezolid and cefepime but follow ID recs  -f/u BCx x2, foot culture, MRSA/MSSA PCR, CRP  -Consult Podiatry  -Consult ID (Dr. Mckenzie)- f/u recs Patient presented to the ED with RLE below the knee swelling, erythema, pain for 3 days after having a cut on his right foot.   -admit to general medicine floor  -duplex US No evidence of right lower extremity DVT. Subcutaneous edema is visualized throughout the right calf.  -RLE edema, erythema noted below the knee with non draining wound on lateral aspect of right foot  -s/p zosyn and vancomycin in the ED   -Sepsis criteria not met at this time, afebrile, lactate 0.3, procal<0.05  -Start patient on linezolid and cefepime but follow ID recs  -f/u BCx x2, foot culture, MRSA/MSSA PCR, CRP  -f/u R foot xray  -Consult Podiatry  -Consult ID (Dr. Mckenzie)- f/u recs Patient presented to the ED with RLE below the knee swelling, erythema, pain for 3 days after having a cut on his right foot. Suspect cellulitis and underlying osteomyelitis.  -admit to general medicine floor  -duplex US No evidence of right lower extremity DVT. Subcutaneous edema is visualized throughout the right calf.  -RLE edema, erythema noted below the knee with non draining wound on lateral aspect of right foot  -s/p zosyn and vancomycin in the ED   -Sepsis criteria not met at this time, afebrile, lactate 0.3, procal<0.05  -Start linezolid (given weight and elevated R:B for vancomycin induced nephrotoxicity) pending MRSA PCR   -Will continue with IV zosyn pending ID recs (has been treated w/ rocephin in the past based on Cx)   -f/u BCx x2, wound culture, MRSA/MSSA PCR, CRP  -f/u R foot xray  -Consult Podiatry (Florin) for further imaging recs (cannot have MRI) and local/surgical care  -Consult ID (Dr. Saucedo)- f/u recs

## 2021-09-20 NOTE — H&P ADULT - PROBLEM SELECTOR PLAN 2
Chronic  -Patient has history of DM1 on insulin pump  -At home: insulin pump Humalog 100 units: 3 units/ hour for 72 total units  -In hospital: 70% of basal rate--> 2.1 units/hour total 48 units  - hypoglycemia protocols in place Chronic  -Patient has history of DM1 on insulin pump  -At home: insulin pump Humalog 100 units: 3 units/ hour for 72 total units  -In hospital: 70% of basal rate--> 2.1 units/hour total 48 units  - hypoglycemia protocols in place  -f/u AM HbA1C Chronic  -Patient has history of DM1 on insulin pump  -At home: insulin pump Humalog 100 units: 3 units/ hour for 72 total units, continue w/ this rate at hospital  - hypoglycemia protocols in place  -f/u AM HbA1C Chronic  -Patient has history of DM1 on insulin pump  -At home: insulin pump Humalog 100 units: 3 units/ hour for 72 total units, continue w/ this rate at hospital  - hypoglycemia protocols in place  -f/u AM HbA1C  -consult diabetes specialist, f/u recs Chronic, type 1  -Patient has history of DM1 on insulin pump  -At home: insulin pump Humalog 100 units: 3 units/hour for 72 total units, continue home regimen  -hypoglycemia protocols in place  -f/u AM HbA1C  -consult diabetes specialist for pump orders, f/u recs

## 2021-09-20 NOTE — H&P ADULT - PROBLEM SELECTOR PLAN 5
Chronic  - Patient received right renal transplant 2013 for CKD  - Cr on admission 1.5, baseline from prev admissions is 1.3   - c/w azithioprine 100 mg daily and tacrolimus 1.5 mg BID Chronic  - Patient received right renal transplant 2013 for CKD  - Cr on admission 1.5, baseline from prev admissions is 1.3   - c/w azithioprine 100 mg daily and tacrolimus 1.5 mg BID  - check tacrolimus level 30 min before administration of medication  - patient takes tacrolimus 9:30 AM and 9:30 PM Chronic  - Patient received right renal transplant 2013 for CKD  - Cr on admission 1.5, baseline from prev admissions is 1.3   - c/w azithioprine 100 mg daily and tacrolimus 1.5 mg BID  - check tacrolimus level 30 min before administration of medication  - patient takes tacrolimus 9:30 AM and 9:30 PM  - Consult Nephro (Dr. Bryant)- f/u recs Chronic  - Patient received right renal transplant 2013 for CKD  - Cr on admission 1.5, baseline of 1.2 per patient - will hold ARB for now  - c/w azathioprine 100 mg daily and tacrolimus 1.5 mg BID  - check tacrolimus level 30 min before administration of medication  - patient takes tacrolimus 9:30 AM and 9:30 PM  - Consult Nephro (Dr. Bryant)- f/u recs

## 2021-09-20 NOTE — CONSULT NOTE ADULT - ASSESSMENT
CKD 3, h/o Kidney transplant 2013, Living related donor (Baseline 1.2)  Cellulitis, Edema  Hypertension  Diabetes    Improved renal indices. Continue preadmission immuno suppressant meds. Can resume losartan if renal function remains stable. Pt will benefit from low dose diuretics as out pt. Rx for cellulitis. Will follow electrolytes and renal function trend.   Avoid nephrotoxic meds as possible. Further recommendations pending clinical course. Thank you for the courtesy of this referral.

## 2021-09-20 NOTE — H&P ADULT - NSHPOUTPATIENTPROVIDERS_GEN_ALL_CORE
PCP - Dr. Kobe Miranda  Kidney Transplant - Dr. Navarro, Dr. Lindsay [facility]  Nephrologist - Dr. Weber  Podiatrist - Dr. Pedersen  Neurologist - Dr. Claire  Endocrinologist - Dr. Garcia

## 2021-09-20 NOTE — H&P ADULT - PROBLEM SELECTOR PLAN 6
Chronic  - Patient has history of HTN   - Presented to the ED with /96--> 117/64  - continue with home losartan 25 mg qdaily, clonidine 0.1 mg BID, Hydralazine 25 mg TID, Metoprolol 25mg for 3 tabs BID  - hold parameters in place Chronic  - Patient has history of HTN   - Presented to the ED with /96--> 117/64  - continue with home clonidine 0.1 mg BID, Hydralazine 25 mg TID, Metoprolol 25mg for 3 tabs BID  - holding home losartan 25 mg q daily in setting of Cr bump   - hold parameters in place Chronic  - Patient has history of HTN   - Presented to the ED with /96--> 117/64  - continue with home clonidine 0.1 mg BID, Hydralazine 25 mg TID, Lopressor 75mg BID  - holding home losartan 25 mg q daily in setting of Cr bump  - hold parameters in place

## 2021-09-20 NOTE — H&P ADULT - NSHPLABSRESULTS_GEN_ALL_CORE
EXAM:  US DPLX LWR EXT VEINS LTD RT                            PROCEDURE DATE:  09/19/2021          INTERPRETATION:  CLINICAL INFORMATION: Right leg swelling and cellulitis.    COMPARISON: 02/10/2021    TECHNIQUE: Duplex sonography of the RIGHT LOWER extremity veins with color and spectral Doppler, with and without compression.    FINDINGS:    There is normal compressibility of the right common femoral, femoral and popliteal veins.  The contralateral common femoral vein is patent.  Doppler examination shows normal spontaneous and phasic flow.    No calf vein thrombosis is detected.    Subcutaneous edema is visualized throughout the right calf.    IMPRESSION:  No evidence of right lower extremity deep venous thrombosis.  Subcutaneous edema is visualized throughout the right calf.        --- End of Report ---            PANKAJ ALANIZ MD; Attending Radiologist  This document has been electronically signed. Sep 20 2021 12:34AM .  EXAM:  US DPLX LWR EXT VEINS LTD RT                            PROCEDURE DATE:  09/19/2021          INTERPRETATION:  CLINICAL INFORMATION: Right leg swelling and cellulitis.    COMPARISON: 02/10/2021    TECHNIQUE: Duplex sonography of the RIGHT LOWER extremity veins with color and spectral Doppler, with and without compression.    FINDINGS:    There is normal compressibility of the right common femoral, femoral and popliteal veins.  The contralateral common femoral vein is patent.  Doppler examination shows normal spontaneous and phasic flow.    No calf vein thrombosis is detected.    Subcutaneous edema is visualized throughout the right calf.    IMPRESSION:  No evidence of right lower extremity deep venous thrombosis.  Subcutaneous edema is visualized throughout the right calf.        --- End of Report ---    PANKAJ ALANIZ MD; Attending Radiologist  This document has been electronically signed. Sep 20 2021 12:34AM

## 2021-09-20 NOTE — H&P ADULT - ASSESSMENT
Patient is a 55 y old M with PMH of CKD s/p  renal transplant, CVA, DM type 1 (on insulin pump), neuropathy, DVT, HLD, Hypertension ,Obesity, Restrictive lung disease (On VPAP) squamous cell carcinoma, presents to ED for redness/swelling R leg. Admitted for RLE cellulitis. Patient is a 55 y old M with PMH of CKD s/p  renal transplant, CVA, DM type 1 (on insulin pump), neuropathy, DVT, HLD, Hypertension ,Obesity, Restrictive lung disease (On VPAP) squamous cell carcinoma, presents to ED for redness/swelling R leg. Admitted for RLE cellulitis.     Patient is a 55 y old M with PMH of CKD s/p  renal transplant, CVA, DM type 1 (on insulin pump), neuropathy, DVT, HLD, Hypertension ,Obesity, Restrictive lung disease (On VPAP) squamous cell carcinoma, presents to ED for redness/swelling R leg. Admitted for RLE cellulitis and possible osteomyelitis     55 y old M with PMHx of CKD s/p renal transplant, CVA, DM type 1 (on insulin pump), neuropathy, DVT, HLD, Hypertension, Obesity, CARMEN (On nocturnal NIPPV), Restrictive Lung Disease, squamous cell carcinoma presents to ED for redness/swelling R leg. Admitted for RLE cellulitis and possible osteomyelitis.

## 2021-09-20 NOTE — H&P ADULT - NSHPPHYSICALEXAM_GEN_ALL_CORE
VITALS:   T(C): 36.9 (09-20-21 @ 01:22), Max: 36.9 (09-20-21 @ 01:22)  HR: 60 (09-20-21 @ 01:22) (60 - 79)  BP: 117/64 (09-20-21 @ 01:22) (117/64 - 180/96)  RR: 20 (09-20-21 @ 01:22) (18 - 20)  SpO2: 94% (09-20-21 @ 01:22) (94% - 98%)    GENERAL: NAD, lying in bed comfortably  HEAD:  Atraumatic, normocephalic  EYES: EOMI, PERRLA, conjunctiva and sclera clear  ENT: Moist mucous membranes  NECK: Supple, no JVD  HEART: Regular rate and rhythm, no murmurs, rubs, or gallops  LUNGS: Unlabored respirations.  Clear to auscultation bilaterally, no crackles, wheezing, or rhonchi  ABDOMEN: Soft, nontender, nondistended, +BS  EXTREMITIES: 2+ pedal pulses, no cyanosis or clubbing. Pitting edema present b/l, open wound on the lateral aspect of R foot. Erythema of RLE. Edema of RLE>LLE. Small scabs present on lower aspect of RLE  NERVOUS SYSTEM:  A&Ox3, decreased sensation of L side body, R side face  SKIN: Erythema and edema of RLE T(C): 36.9 (09-20-21 @ 01:22), Max: 36.9 (09-20-21 @ 01:22)  HR: 60 (09-20-21 @ 01:22) (60 - 79)  BP: 117/64 (09-20-21 @ 01:22) (117/64 - 180/96)  RR: 20 (09-20-21 @ 01:22) (18 - 20)  SpO2: 94% (09-20-21 @ 01:22) (94% - 98%)    GENERAL: NAD, lying in bed comfortably  HEAD:  Atraumatic, normocephalic  EYES: EOMI, PERRLA, conjunctiva and sclera clear  ENT: Moist mucous membranes  NECK: Supple, no JVD  HEART: Regular rate and rhythm, no murmurs, rubs, or gallops  LUNGS: Unlabored respirations.  Clear to auscultation bilaterally, no crackles, wheezing, or rhonchi  ABDOMEN: Soft, nontender, nondistended, +BS  EXTREMITIES: 2+ pedal pulses, no cyanosis or clubbing. Pitting edema present b/l, open wound on the lateral aspect of R foot. Erythema of RLE. Edema of RLE>LLE. Small scabs present on lower aspect of RLE  NERVOUS SYSTEM:  A&Ox3, decreased sensation of L side body, R side face  SKIN: Erythema and edema of RLE, open wound, foul smelling

## 2021-09-20 NOTE — CONSULT NOTE ADULT - SUBJECTIVE AND OBJECTIVE BOX
[facility], Division of Infectious Diseases  MEL Mckoy, CASH Ellis  [phone]  after hours and weekends [phone]    LUTHER NORIEGA  55y, Male  [id number]    HPI--  HPI:  Patient is a 55 y old M with PMHx of CKD s/p renal transplant, CVA, DM type 1 (on insulin pump), neuropathy, DVT, HLD, Hypertension, Obesity, CARMEN (On nocturnal NIPPV), Restrictive Lung Disease, squamous cell carcinoma presents to ED for redness/swelling R leg. 3 days ago patient cut his foot, initially there was discharge, however that stopped within the first day. Patient states that he began to see swelling, redness, and felt 5/10 throbbing non-radiating pain in RLE. He has had a history of osteomyelitis before on the same foot, was treated with IV ceftriaxone. Denies chest pain, palpitations, dyspnea, headache, dizziness, abdominal pain, n/v/d. He is a renal transplant patient on anti-rejection medication - takes tacrolimus at 930AM and 930PM. Follows with nephrology Dr. Weber and states his baseline creatinine is 1.2.    In the ED   Vitals 97.2 F | HR 79 | /96-->117/64 | RR 18 | 98% RA   Labs: h/h 12.1/36.8, BUN 29, Cr 1.5, Alb 3.2, AST 40, GFR 52, Lactate 0.3  Duplex U/S- No evidence of right lower extremity deep venous thrombosis. Subcutaneous edema is visualized throughout the right calf.  CXR- no acute infiltrates on personal read  EKG: Sinus rhythm with 1st degree AV block 64 bpm, QT/QTc 406/418 (20 Sep 2021 02:17)      PMH/PSH--  Hypertension    Hyperlipidemia    Diabetes mellitus    CKD (chronic kidney disease)    Diabetic peripheral neuropathy    Obesity (BMI 30-39.9)    CVA (cerebral vascular accident)    Squamous cell carcinoma of skin of left ear    History of DVT (deep vein thrombosis)    Recurrent squamous cell carcinoma of skin    Toe infection    Ear disease    Vasectomy status    H/O foot surgery    End-stage renal failure with renal transplant    S/P kidney transplant    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        Allergies--      Medications--  Antibiotics: piperacillin/tazobactam IVPB.. 3.375 Gram(s) IV Intermittent every 8 hours    Immunologic: azaTHIOprine 100 milliGRAM(s) Oral daily  influenza   Vaccine 0.5 milliLiter(s) IntraMuscular once  tacrolimus 1.5 milliGRAM(s) Oral two times a day    Other: aspirin enteric coated  atorvastatin  cloNIDine  dextrose 40% Gel  dextrose 5%.  dextrose 5%.  dextrose 50% Injectable  dextrose 50% Injectable  dextrose 50% Injectable  famotidine    Tablet  gabapentin  glucagon  Injectable  heparin   Injectable  hydrALAZINE  insulin lispro (HumaLOG) Pump  metoprolol tartrate  sodium chloride 0.9%.      Social History--  EtOH: denies ***  Tobacco: denies ***  Drug Use: denies ***    Family/Marital History--  No pertinent family history in first degree relatives    Family history of diabetes mellitus (DM)    FH: skin cancer          Travel/Environmental/Occupational History:  *** insert T/E/O Hx ***    Review of Systems:  A >=10-point review of systems was obtained.     Pertinent positives and negatives--  Constitutional: No fevers. No Chills. No Rigors.   Eyes:  ENMT:  Cardiovascular: No chest pain. No palpitations.  Respiratory: No shortness of breath. No cough.  Gastrointestinal: No nausea or vomiting. No diarrhea or constipation.   Genitourinary:  Musculoskeletal:  Skin:  Neurologic:  Psychiatric: Pleasant. Appropriate affect.  Endocrine:  Heme/Lymphatic:  Allergy/Immunologic:    Review of systems otherwise negative except as previously noted.    Physical Exam--  Vital Signs: T(F): 97.6 (09-20-21 @ 12:26), Max: 98.5 (09-20-21 @ 05:00)  HR: 60 (09-20-21 @ 13:04)  BP: 113/58 (09-20-21 @ 13:04)  RR: 19 (09-20-21 @ 12:26)  SpO2: 91% (09-20-21 @ 12:26)  Wt(kg): --  General: Nontoxic-appearing Male in no acute distress.  HEENT: AT/NC. PERRL. EOMI. Anicteric. Conjunctiva pink and moist. Oropharynx clear. Dentition fair.  Neck: Not rigid. No sense of mass.  Nodes: None palpable.  Lungs: Clear bilaterally without rales, wheezing or rhonchi  Heart: Regular rate and rhythm. No Murmur. No rub. No gallop. No palpable thrill.  Abdomen: Bowel sounds present and normoactive. Soft. Nondistended. Nontender. No sense of mass. No organomegaly.  Back: No spinal tenderness. No costovertebral angle tenderness.   Extremities: No cyanosis or clubbing. No edema.   Skin: Warm. Dry. Good turgor. No rash. No vasculitic stigmata.  Psychiatric: Appropriate affect and mood for situation.         Laboratory & Imaging Data--  CBC                        11.3   7.53  )-----------( 247      ( 20 Sep 2021 07:18 )             34.6       Chemistries  09-20    141  |  105  |  23  ----------------------------<  61<L>  3.7   |  29  |  1.10    Ca    8.2<L>      20 Sep 2021 07:18    TPro  6.8  /  Alb  2.7<L>  /  TBili  0.5  /  DBili  x   /  AST  20  /  ALT  20  /  AlkPhos  84  09-20      Culture Data           [facility], Division of Infectious Diseases  MEL Mckoy, CASH Ellis  [phone]  after hours and weekends [phone]    DARRYLALEXIA LUTHER  55y, Male  [id number]      HPI:  Patient is a 55 y old M with PMHx of CKD s/p renal transplant, bactrim ppx, CVA, DM type 1 (on insulin pump), neuropathy, DVT, HLD, Hypertension, Obesity, CARMEN (On nocturnal NIPPV), Restrictive Lung Disease, squamous cell carcinoma presents to ED for redness/swelling R leg. pt states that 4 days ago patient hit his right foot against a wooden table.  Had a little blood, but nothing significant, called wound care to get in the next day, but was full  yesterday, swollen and red throbbing pain  no fever, no chills,   he had covid 19, s/p MAB in May  not vaccinated was told to wait and now states they checked his antibodies and still present so he is still waiting to get it.      PMH/PSH--  Hypertensio  Hyperlipidemia  Diabetes mellitus  CKD (chronic kidney disease)  Diabetic peripheral neuropathy  Obesity (BMI 30-39.9)  CVA (cerebral vascular accident)  Squamous cell carcinoma of skin of left ear  History of DVT (deep vein thrombosis)  Recurrent squamous cell carcinoma of skin  Toe infection  Ear disease  Vasectomy status  H/O foot surgery  End-stage renal failure with renal transplant  S/P kidney transplant  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        Allergies-- NKDA      Medications--  Antibiotics: piperacillin/tazobactam IVPB.. 3.375 Gram(s) IV Intermittent every 8 hours    Immunologic: azaTHIOprine 100 milliGRAM(s) Oral daily  influenza   Vaccine 0.5 milliLiter(s) IntraMuscular once  tacrolimus 1.5 milliGRAM(s) Oral two times a day    Other: aspirin enteric coated  atorvastatin  cloNIDine  dextrose 40% Gel  dextrose 5%.  dextrose 5%.  dextrose 50% Injectable  dextrose 50% Injectable  dextrose 50% Injectable  famotidine    Tablet  gabapentin  glucagon  Injectable  heparin   Injectable  hydrALAZINE  insulin lispro (HumaLOG) Pump  metoprolol tartrate  sodium chloride 0.9%.      Social History--  EtOH: denies ***  Tobacco: denies ***  Drug Use: denies ***    Family/Marital History--  Family history of diabetes mellitus (DM)  FH: skin cancer          Travel/Environmental/Occupational History:  retired      Review of Systems:  A >=10-point review of systems was obtained.     Pertinent positives and negatives--  Constitutional: No fevers. No Chills. No Rigors.   Cardiovascular: No chest pain. No palpitations.  Respiratory: No shortness of breath. No cough.  Gastrointestinal: No nausea or vomiting. No diarrhea or constipation.   gu no dysuria   Psychiatric: Pleasant. Appropriate affect.    Review of systems otherwise negative except as previously noted.    Physical Exam--  Vital Signs: T(F): 97.6 (09-20-21 @ 12:26), Max: 98.5 (09-20-21 @ 05:00)  HR: 60 (09-20-21 @ 13:04)  BP: 113/58 (09-20-21 @ 13:04)  RR: 19 (09-20-21 @ 12:26)  SpO2: 91% (09-20-21 @ 12:26)  Wt(kg): --  General: Nontoxic-appearing Male in no acute distress.  HEENT: AT/NC.  Neck: Not rigid. No sense of mass.  Nodes: None palpable.  Lungs: Clear bilaterally without rales, wheezing or rhonchi  Heart: Regular rate and rhythm  Abdomen: Bowel sounds present and normoactive. Soft. Nondistended. Nontender.   Extremities: No cyanosis or clubbing.  rle + edema. not hot, mild erythema,   right foot wiht ulcer, not foul smelling, no discharge, not tender, no surrounding erythema  Skin: Warm. Dry. Good turgor. No rash. No vasculitic stigmata.  Psychiatric: Appropriate affect and mood for situation.         Laboratory & Imaging Data--  CBC                        11.3   7.53  )-----------( 247      ( 20 Sep 2021 07:18 )             34.6       Chemistries  09-20    141  |  105  |  23  ----------------------------<  61<L>  3.7   |  29  |  1.10    Ca    8.2<L>      20 Sep 2021 07:18    TPro  6.8  /  Alb  2.7<L>  /  TBili  0.5  /  DBili  x   /  AST  20  /  ALT  20  /  AlkPhos  84  09-20      Culture Data      < from: CT Foot w/ IV Cont, Right (09.20.21 @ 11:50) >    FINDINGS:    No acute fracture or dislocation. Cystic changes are present within the distal navicular, likely secondary to overlying focal chondral loss. Suture anchors present within the medial navicular. There are no osseous erosions or periosteal reaction. Soft tissue irregularity is present along the lateralaspect at the level of the proximal/mid fifth metatarsal. No well-circumscribed fluid collection is appreciated. There is diffuse muscle atrophy. There is diffuse subcutaneous edema about the foot/ankle.    IMPRESSION:    No CT evidence of osteomyelitis, particularly of the proximal fifth metatarsal.  Lateral soft tissue wound at the level of the fifth metatarsal without discrete fluid collection.    If clinical suspicion persists for osteomyelitis, MRI or nuclear imaging can be performed if able.    < end of copied text >  < from: CT 3D Reconstruct w/o Workstation (09.20.21 @ 11:49) >  MPRESSION:    No CT evidence of osteomyelitis, particularly of the proximal fifth metatarsal.  Lateral soft tissue wound at the level of the fifth metatarsal without discrete fluid collection.    If clinical suspicion persists for osteomyelitis, MRI or nuclear imaging can be performed if able.    < end of copied text >  < from: Xray Foot AP + Lateral + Oblique, Right (09.20.21 @ 04:39) >    IMPRESSION:    No acute displaced fracture seen. Postsurgical changes within the medial aspect of the navicular.    Status post amputation of the second and third digits to the level of the middle phalanges. No definite cortical erosion or periostitis at the amputation margins to suggest osteomyelitis.    Questionable ulcer overlying the lateral aspect of the foot. No definite cortical erosion or periostitis to suggest osteomyelitis.    However, if there is further clinical concern for osteomyelitis, recommend further evaluation with MRI of the foot, as this is a more sensitive test to evaluate for osteomyelitis.    Soft tissue swelling overlying the medial and lateral aspect of the hindfoot/midfoot.    --- End of Report ---    < end of copied text >  < from: US Duplex Venous Lower Ext Ltd, Right (09.19.21 @ 23:20) >    FINDINGS:    There is normal compressibility of the right common femoral, femoral and popliteal veins.  The contralateral common femoral vein is patent.  Doppler examination shows normal spontaneous and phasic flow.    No calf vein thrombosis is detected.    Subcutaneous edema is visualized throughout the right calf.    IMPRESSION:  No evidence of right lower extremity deep venous thrombosis.  Subcutaneous edema is visualized throughout the right calf.    < from: Xray Chest 1 View-PORTABLE IMMEDIATE (09.19.21 @ 22:23) >    INTERPRETATION:  Chest portable.    Clinical History: Shortness of breath, cough.    Comparison: 2/10/2021.    Single AP view submitted.    The evaluation of the cardiomediastinal silhouette is limited on portable technique.  Loop recorder device overlies the left cardiac margin.    Low lung volumes are present.  No focal consolidation, effusion or pneumothorax is noted.    Impression:    No acute pulmonary process demonstrated.      < end of copied text >

## 2021-09-20 NOTE — CONSULT NOTE ADULT - ASSESSMENT
Physical Exam:   Vital Signs Last 24 Hrs  T(C): 36.4 (20 Sep 2021 12:26), Max: 36.9 (20 Sep 2021 01:22)  T(F): 97.6 (20 Sep 2021 12:26), Max: 98.5 (20 Sep 2021 05:00)  HR: 60 (20 Sep 2021 13:04) (56 - 79)  BP: 113/58 (20 Sep 2021 13:04) (101/62 - 180/96)  BP(mean): --  RR: 19 (20 Sep 2021 12:26) (18 - 20)  SpO2: 91% (20 Sep 2021 12:26) (91% - 98%)    General: NAD, denies Fever, chills  CVS: S1S2 no M/R/G  Resp: CTA in all fields     eGFR if Non African American: 76 mL/min/1.73M2 (09-20-21 @ 07:18)  eGFR if African American: 88 mL/min/1.73M2 (09-20-21 @ 07:18)  eGFR if Non African American: 52 mL/min/1.73M2 (09-19-21 @ 22:46)  eGFR if : 60 mL/min/1.73M2 (09-19-21 @ 22:46)      CAPILLARY BLOOD GLUCOSE      POCT Blood Glucose.: 154 mg/dL (20 Sep 2021 12:09)  POCT Blood Glucose.: 109 mg/dL (20 Sep 2021 07:33)  POCT Blood Glucose.: 75 mg/dL (20 Sep 2021 05:58)  POCT Blood Glucose.: 82 mg/dL (20 Sep 2021 04:21)      Cholesterol, Serum: 113 mg/dL (05.19.21 @ 08:36)     HDL Cholesterol, Serum: 22 mg/dL (05.19.21 @ 08:36)     LDL Cholesterol Calculated: 66 mg/dL (05.19.21 @ 08:36)     DIET: CC

## 2021-09-20 NOTE — CONSULT NOTE ADULT - SUBJECTIVE AND OBJECTIVE BOX
Patient is a 55y old  Male who presents with a chief complaint of cellulitis (20 Sep 2021 15:05)    Type:1 DX @25 years old.  known complications: CVA, peripheral neuropathy. Endocrine:  [place].  Rx home: medtronic minimed-humalog. needs equipment will change set up tomorrow. explained the need to use our insulin when refilling the reserviour. wears G6 dexcom- off now 2/2 CT. Glucometer checks/CGM 3-4 x day , denies any needs, Hx no ASCVD, + CKD, no HF    HPI:  Patient is a 55 y old M with PMHx of CKD s/p renal transplant, CVA, DM type 1 (on insulin pump), neuropathy, DVT, HLD, Hypertension, Obesity, CARMEN (On nocturnal NIPPV), Restrictive Lung Disease, squamous cell carcinoma presents to ED for redness/swelling R leg. 3 days ago patient cut his foot, initially there was discharge, however that stopped within the first day. Patient states that he began to see swelling, redness, and felt 5/10 throbbing non-radiating pain in RLE. He has had a history of osteomyelitis before on the same foot, was treated with IV ceftriaxone. Denies chest pain, palpitations, dyspnea, headache, dizziness, abdominal pain, n/v/d. He is a renal transplant patient on anti-rejection medication - takes tacrolimus at 930AM and 930PM. Follows with nephrology Dr. Weber and states his baseline creatinine is 1.2.    In the ED   Vitals 97.2 F | HR 79 | /96-->117/64 | RR 18 | 98% RA   Labs: h/h 12.1/36.8, BUN 29, Cr 1.5, Alb 3.2, AST 40, GFR 52, Lactate 0.3  Duplex U/S- No evidence of right lower extremity deep venous thrombosis. Subcutaneous edema is visualized throughout the right calf.  CXR- no acute infiltrates on personal read  EKG: Sinus rhythm with 1st degree AV block 64 bpm, QT/QTc 406/418 (20 Sep 2021 02:17)      PAST MEDICAL & SURGICAL HISTORY:  Hypertension    Hyperlipidemia    Diabetes mellitus  Type 1 on insulin pump    CKD (chronic kidney disease)  Renal Transplant 2013 at [facility]    Diabetic peripheral neuropathy    Obesity (BMI 30-39.9)    CVA (cerebral vascular accident)  [place] Stroke  low L body sensation  low R face sensation  decreased peripheral vision  poor balance    Squamous cell carcinoma of skin of left ear    History of DVT (deep vein thrombosis)    Recurrent squamous cell carcinoma of skin  nose, L arm    Toe infection  2007 L 4th Toe surgery-amputation secondary to osteomyelitis    Ear disease  Left inner ear surgery, 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, Can NOT have MRI    Vasectomy status  s/p b/l  vasectomy    H/O foot surgery  2005 - right foot - bone fracture - s/p ORIF and subsequent removal of hardware    End-stage renal failure with renal transplant  12/2013    S/P kidney transplant    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        REVIEW OF SYSTEMS  General:	as above  Respiratory: NAD, No SOB, no cough  Cardiovascular: No chest pain, no palpitations	  Endocrine: no polyuria, no polydipsia, or S/S of hypoglycemia        Allergies    No Known Allergies    Intolerances        MEDICATIONS  (STANDING):  aspirin enteric coated 81 milliGRAM(s) Oral daily  atorvastatin 10 milliGRAM(s) Oral at bedtime  azaTHIOprine 100 milliGRAM(s) Oral daily  cloNIDine 0.1 milliGRAM(s) Oral two times a day  dextrose 40% Gel 15 Gram(s) Oral once  dextrose 5%. 1000 milliLiter(s) (50 mL/Hr) IV Continuous <Continuous>  dextrose 5%. 1000 milliLiter(s) (100 mL/Hr) IV Continuous <Continuous>  dextrose 50% Injectable 25 Gram(s) IV Push once  dextrose 50% Injectable 12.5 Gram(s) IV Push once  dextrose 50% Injectable 25 Gram(s) IV Push once  famotidine    Tablet 20 milliGRAM(s) Oral daily  gabapentin 300 milliGRAM(s) Oral two times a day  glucagon  Injectable 1 milliGRAM(s) IntraMuscular once  heparin   Injectable 5000 Unit(s) SubCutaneous every 8 hours  hydrALAZINE 25 milliGRAM(s) Oral three times a day  influenza   Vaccine 0.5 milliLiter(s) IntraMuscular once  insulin lispro (HumaLOG) Pump 1 Each SubCutaneous Continuous Pump  metoprolol tartrate 75 milliGRAM(s) Oral two times a day  piperacillin/tazobactam IVPB.. 3.375 Gram(s) IV Intermittent every 8 hours  sodium chloride 0.9%. 1000 milliLiter(s) (80 mL/Hr) IV Continuous <Continuous>  tacrolimus 1.5 milliGRAM(s) Oral two times a day

## 2021-09-21 ENCOUNTER — RESULT REVIEW (OUTPATIENT)
Age: 56
End: 2021-09-21

## 2021-09-21 LAB
ANION GAP SERPL CALC-SCNC: 3 MMOL/L — LOW (ref 5–17)
BASOPHILS # BLD AUTO: 0.05 K/UL — SIGNIFICANT CHANGE UP (ref 0–0.2)
BASOPHILS NFR BLD AUTO: 1 % — SIGNIFICANT CHANGE UP (ref 0–2)
BUN SERPL-MCNC: 17 MG/DL — SIGNIFICANT CHANGE UP (ref 7–23)
CALCIUM SERPL-MCNC: 8.4 MG/DL — LOW (ref 8.5–10.1)
CHLORIDE SERPL-SCNC: 108 MMOL/L — SIGNIFICANT CHANGE UP (ref 96–108)
CO2 SERPL-SCNC: 31 MMOL/L — SIGNIFICANT CHANGE UP (ref 22–31)
COVID-19 SPIKE DOMAIN AB INTERP: POSITIVE
COVID-19 SPIKE DOMAIN ANTIBODY RESULT: 93.5 U/ML — HIGH
CREAT SERPL-MCNC: 1.2 MG/DL — SIGNIFICANT CHANGE UP (ref 0.5–1.3)
EOSINOPHIL # BLD AUTO: 0.34 K/UL — SIGNIFICANT CHANGE UP (ref 0–0.5)
EOSINOPHIL NFR BLD AUTO: 6.5 % — HIGH (ref 0–6)
GLUCOSE SERPL-MCNC: 125 MG/DL — HIGH (ref 70–99)
HCT VFR BLD CALC: 35.2 % — LOW (ref 39–50)
HGB BLD-MCNC: 11.4 G/DL — LOW (ref 13–17)
IMM GRANULOCYTES NFR BLD AUTO: 0.4 % — SIGNIFICANT CHANGE UP (ref 0–1.5)
LYMPHOCYTES # BLD AUTO: 1.15 K/UL — SIGNIFICANT CHANGE UP (ref 1–3.3)
LYMPHOCYTES # BLD AUTO: 22.1 % — SIGNIFICANT CHANGE UP (ref 13–44)
MCHC RBC-ENTMCNC: 29.4 PG — SIGNIFICANT CHANGE UP (ref 27–34)
MCHC RBC-ENTMCNC: 32.4 GM/DL — SIGNIFICANT CHANGE UP (ref 32–36)
MCV RBC AUTO: 90.7 FL — SIGNIFICANT CHANGE UP (ref 80–100)
MONOCYTES # BLD AUTO: 0.52 K/UL — SIGNIFICANT CHANGE UP (ref 0–0.9)
MONOCYTES NFR BLD AUTO: 10 % — SIGNIFICANT CHANGE UP (ref 2–14)
MRSA PCR RESULT.: SIGNIFICANT CHANGE UP
NEUTROPHILS # BLD AUTO: 3.12 K/UL — SIGNIFICANT CHANGE UP (ref 1.8–7.4)
NEUTROPHILS NFR BLD AUTO: 60 % — SIGNIFICANT CHANGE UP (ref 43–77)
NRBC # BLD: 0 /100 WBCS — SIGNIFICANT CHANGE UP (ref 0–0)
PLATELET # BLD AUTO: 238 K/UL — SIGNIFICANT CHANGE UP (ref 150–400)
POTASSIUM SERPL-MCNC: 4.4 MMOL/L — SIGNIFICANT CHANGE UP (ref 3.5–5.3)
POTASSIUM SERPL-SCNC: 4.4 MMOL/L — SIGNIFICANT CHANGE UP (ref 3.5–5.3)
RBC # BLD: 3.88 M/UL — LOW (ref 4.2–5.8)
RBC # FLD: 13.3 % — SIGNIFICANT CHANGE UP (ref 10.3–14.5)
S AUREUS DNA NOSE QL NAA+PROBE: SIGNIFICANT CHANGE UP
SARS-COV-2 IGG+IGM SERPL QL IA: 93.5 U/ML — HIGH
SARS-COV-2 IGG+IGM SERPL QL IA: POSITIVE
SODIUM SERPL-SCNC: 142 MMOL/L — SIGNIFICANT CHANGE UP (ref 135–145)
WBC # BLD: 5.2 K/UL — SIGNIFICANT CHANGE UP (ref 3.8–10.5)
WBC # FLD AUTO: 5.2 K/UL — SIGNIFICANT CHANGE UP (ref 3.8–10.5)

## 2021-09-21 PROCEDURE — 11046 DBRDMT MUSC&/FSCA EA ADDL: CPT

## 2021-09-21 PROCEDURE — 99232 SBSQ HOSP IP/OBS MODERATE 35: CPT

## 2021-09-21 PROCEDURE — 99233 SBSQ HOSP IP/OBS HIGH 50: CPT | Mod: GC

## 2021-09-21 PROCEDURE — 88304 TISSUE EXAM BY PATHOLOGIST: CPT | Mod: 26

## 2021-09-21 PROCEDURE — 73630 X-RAY EXAM OF FOOT: CPT | Mod: 26,RT

## 2021-09-21 PROCEDURE — 11043 DBRDMT MUSC&/FSCA 1ST 20/<: CPT

## 2021-09-21 RX ORDER — HEPARIN SODIUM 5000 [USP'U]/ML
5000 INJECTION INTRAVENOUS; SUBCUTANEOUS EVERY 8 HOURS
Refills: 0 | Status: DISCONTINUED | OUTPATIENT
Start: 2021-09-21 | End: 2021-09-22

## 2021-09-21 RX ORDER — LABETALOL HCL 100 MG
10 TABLET ORAL
Refills: 0 | Status: DISCONTINUED | OUTPATIENT
Start: 2021-09-21 | End: 2021-09-22

## 2021-09-21 RX ORDER — ONDANSETRON 8 MG/1
4 TABLET, FILM COATED ORAL ONCE
Refills: 0 | Status: DISCONTINUED | OUTPATIENT
Start: 2021-09-21 | End: 2021-09-21

## 2021-09-21 RX ORDER — METOPROLOL TARTRATE 50 MG
75 TABLET ORAL
Refills: 0 | Status: DISCONTINUED | OUTPATIENT
Start: 2021-09-21 | End: 2021-09-22

## 2021-09-21 RX ORDER — FAMOTIDINE 10 MG/ML
20 INJECTION INTRAVENOUS DAILY
Refills: 0 | Status: DISCONTINUED | OUTPATIENT
Start: 2021-09-21 | End: 2021-09-22

## 2021-09-21 RX ORDER — HYDROMORPHONE HYDROCHLORIDE 2 MG/ML
0.5 INJECTION INTRAMUSCULAR; INTRAVENOUS; SUBCUTANEOUS
Refills: 0 | Status: DISCONTINUED | OUTPATIENT
Start: 2021-09-21 | End: 2021-09-21

## 2021-09-21 RX ORDER — AZATHIOPRINE 100 MG/1
100 TABLET ORAL DAILY
Refills: 0 | Status: DISCONTINUED | OUTPATIENT
Start: 2021-09-21 | End: 2021-09-22

## 2021-09-21 RX ORDER — INSULIN LISPRO 100/ML
1 VIAL (ML) SUBCUTANEOUS
Refills: 0 | Status: DISCONTINUED | OUTPATIENT
Start: 2021-09-21 | End: 2021-09-22

## 2021-09-21 RX ORDER — GLUCAGON INJECTION, SOLUTION 0.5 MG/.1ML
1 INJECTION, SOLUTION SUBCUTANEOUS ONCE
Refills: 0 | Status: DISCONTINUED | OUTPATIENT
Start: 2021-09-21 | End: 2021-09-22

## 2021-09-21 RX ORDER — ATORVASTATIN CALCIUM 80 MG/1
10 TABLET, FILM COATED ORAL AT BEDTIME
Refills: 0 | Status: DISCONTINUED | OUTPATIENT
Start: 2021-09-21 | End: 2021-09-22

## 2021-09-21 RX ORDER — ASPIRIN/CALCIUM CARB/MAGNESIUM 324 MG
81 TABLET ORAL DAILY
Refills: 0 | Status: DISCONTINUED | OUTPATIENT
Start: 2021-09-21 | End: 2021-09-22

## 2021-09-21 RX ORDER — DEXTROSE 50 % IN WATER 50 %
15 SYRINGE (ML) INTRAVENOUS ONCE
Refills: 0 | Status: DISCONTINUED | OUTPATIENT
Start: 2021-09-21 | End: 2021-09-22

## 2021-09-21 RX ORDER — ACETAMINOPHEN 500 MG
1000 TABLET ORAL ONCE
Refills: 0 | Status: COMPLETED | OUTPATIENT
Start: 2021-09-21 | End: 2021-09-21

## 2021-09-21 RX ORDER — DEXTROSE 50 % IN WATER 50 %
25 SYRINGE (ML) INTRAVENOUS ONCE
Refills: 0 | Status: DISCONTINUED | OUTPATIENT
Start: 2021-09-21 | End: 2021-09-22

## 2021-09-21 RX ORDER — HYDRALAZINE HCL 50 MG
25 TABLET ORAL THREE TIMES A DAY
Refills: 0 | Status: DISCONTINUED | OUTPATIENT
Start: 2021-09-21 | End: 2021-09-22

## 2021-09-21 RX ORDER — TACROLIMUS 5 MG/1
1.5 CAPSULE ORAL
Refills: 0 | Status: DISCONTINUED | OUTPATIENT
Start: 2021-09-21 | End: 2021-09-22

## 2021-09-21 RX ORDER — GABAPENTIN 400 MG/1
300 CAPSULE ORAL
Refills: 0 | Status: DISCONTINUED | OUTPATIENT
Start: 2021-09-21 | End: 2021-09-22

## 2021-09-21 RX ORDER — PIPERACILLIN AND TAZOBACTAM 4; .5 G/20ML; G/20ML
3.38 INJECTION, POWDER, LYOPHILIZED, FOR SOLUTION INTRAVENOUS EVERY 8 HOURS
Refills: 0 | Status: DISCONTINUED | OUTPATIENT
Start: 2021-09-21 | End: 2021-09-22

## 2021-09-21 RX ORDER — SODIUM CHLORIDE 9 MG/ML
1000 INJECTION, SOLUTION INTRAVENOUS
Refills: 0 | Status: DISCONTINUED | OUTPATIENT
Start: 2021-09-21 | End: 2021-09-21

## 2021-09-21 RX ADMIN — PIPERACILLIN AND TAZOBACTAM 25 GRAM(S): 4; .5 INJECTION, POWDER, LYOPHILIZED, FOR SOLUTION INTRAVENOUS at 16:39

## 2021-09-21 RX ADMIN — TACROLIMUS 1.5 MILLIGRAM(S): 5 CAPSULE ORAL at 21:34

## 2021-09-21 RX ADMIN — Medication 1000 MILLIGRAM(S): at 17:00

## 2021-09-21 RX ADMIN — HEPARIN SODIUM 5000 UNIT(S): 5000 INJECTION INTRAVENOUS; SUBCUTANEOUS at 21:34

## 2021-09-21 RX ADMIN — SODIUM CHLORIDE 75 MILLILITER(S): 9 INJECTION, SOLUTION INTRAVENOUS at 16:28

## 2021-09-21 RX ADMIN — Medication 25 MILLIGRAM(S): at 06:19

## 2021-09-21 RX ADMIN — Medication 75 MILLIGRAM(S): at 18:01

## 2021-09-21 RX ADMIN — Medication 0.1 MILLIGRAM(S): at 18:01

## 2021-09-21 RX ADMIN — Medication 25 MILLIGRAM(S): at 21:34

## 2021-09-21 RX ADMIN — TACROLIMUS 1.5 MILLIGRAM(S): 5 CAPSULE ORAL at 09:13

## 2021-09-21 RX ADMIN — GABAPENTIN 300 MILLIGRAM(S): 400 CAPSULE ORAL at 06:19

## 2021-09-21 RX ADMIN — GABAPENTIN 300 MILLIGRAM(S): 400 CAPSULE ORAL at 18:00

## 2021-09-21 RX ADMIN — PIPERACILLIN AND TAZOBACTAM 25 GRAM(S): 4; .5 INJECTION, POWDER, LYOPHILIZED, FOR SOLUTION INTRAVENOUS at 06:18

## 2021-09-21 RX ADMIN — AZATHIOPRINE 100 MILLIGRAM(S): 100 TABLET ORAL at 11:06

## 2021-09-21 RX ADMIN — Medication 400 MILLIGRAM(S): at 16:31

## 2021-09-21 RX ADMIN — ATORVASTATIN CALCIUM 10 MILLIGRAM(S): 80 TABLET, FILM COATED ORAL at 21:34

## 2021-09-21 RX ADMIN — SODIUM CHLORIDE 80 MILLILITER(S): 9 INJECTION INTRAMUSCULAR; INTRAVENOUS; SUBCUTANEOUS at 06:18

## 2021-09-21 RX ADMIN — Medication 0.1 MILLIGRAM(S): at 06:19

## 2021-09-21 NOTE — PROGRESS NOTE ADULT - SUBJECTIVE AND OBJECTIVE BOX
[facility] -- Julisa Jansen Grossman, [place], Karan Weldon, Jay Becker: Office # [phone]    Follow Up:  Pre-operative clearance, Cellulitis Right Leg    Subjective/Observations: Patient seen and examined, awake, alert, resting comfortably in bed, no complaints of chest pain, dyspnea, palpitations or dizziness, orthopnea and PND. Tolerating room air.     REVIEW OF SYSTEMS: All review of systems is negative for eye, ENT, GI, , allergic, dermatologic, musculoskeletal and neurologic except as described above    PAST MEDICAL & SURGICAL HISTORY:  Hypertension    Hyperlipidemia    Diabetes mellitus  Type 1 on insulin pump    CKD (chronic kidney disease)  Renal Transplant 2013 at [facility]    Diabetic peripheral neuropathy    Obesity (BMI 30-39.9)    CVA (cerebral vascular accident)  Wallenberg Stroke  low L body sensation  low R face sensation  decreased peripheral vision  poor balance    Squamous cell carcinoma of skin of left ear    History of DVT (deep vein thrombosis)    Recurrent squamous cell carcinoma of skin  nose, L arm    Toe infection  2007 L 4th Toe surgery-amputation secondary to osteomyelitis    Ear disease  Left inner ear surgery, 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 1000 milliLiter(s) (50 mL/Hr) IV Continuous <Continuous>  dextrose 5%. 1000 milliLiter(s) (100 mL/Hr) IV Continuous <Continuous>  dextrose 50% Injectable 25 Gram(s) IV Push once  dextrose 50% Injectable 12.5 Gram(s) IV Push once  dextrose 50% Injectable 25 Gram(s) IV Push once  famotidine    Tablet 20 milliGRAM(s) Oral daily  gabapentin 300 milliGRAM(s) Oral two times a day  glucagon  Injectable 1 milliGRAM(s) IntraMuscular once  heparin   Injectable 5000 Unit(s) SubCutaneous every 8 hours  hydrALAZINE 25 milliGRAM(s) Oral three times a day  influenza   Vaccine 0.5 milliLiter(s) IntraMuscular once  insulin lispro (HumaLOG) Pump 1 Each SubCutaneous Continuous Pump  metoprolol tartrate 75 milliGRAM(s) Oral two times a day  piperacillin/tazobactam IVPB.. 3.375 Gram(s) IV Intermittent every 8 hours  sodium chloride 0.9%. 1000 milliLiter(s) (80 mL/Hr) IV Continuous <Continuous>  tacrolimus 1.5 milliGRAM(s) Oral two times a day    MEDICATIONS  (PRN):    Allergies    No Known Allergies    Intolerances      Vital Signs Last 24 Hrs  T(C): 36.4 (21 Sep 2021 04:35), Max: 36.9 (20 Sep 2021 17:08)  T(F): 97.5 (21 Sep 2021 04:35), Max: 98.5 (20 Sep 2021 21:43)  HR: 55 (21 Sep 2021 06:14) (51 - 64)  BP: 131/69 (21 Sep 2021 04:35) (113/58 - 144/66)  RR: 20 (21 Sep 2021 04:35) (19 - 20)  SpO2: 98% (21 Sep 2021 04:35) (91% - 98%)  I&O's Summary    20 Sep 2021 07:01  -  21 Sep 2021 07:00  --------------------------------------------------------  IN: 2180 mL / OUT: 0 mL / NET: 2180 mL          TELE: Not on telemetry   PHYSICAL EXAM:  Appearance: NAD, no distress, alert,  HEENT: Moist Mucous Membranes, Anicteric  Cardiovascular: Regular rate and rhythm, Normal S1 S2, No JVD, No murmurs, No rubs, gallops or clicks  Respiratory: Non-labored, Clear to auscultation, No rales, No rhonchi, No wheezing.   Gastrointestinal:  Soft, Non-tender, + BS  Neurologic: Non-focal  Skin: Warm and dry, No visible rashes or ulcers, No ecchymosis, No cyanosis  Musculoskeletal: No clubbing, No cyanosis, No joint swelling/tenderness  Psychiatry: Mood & affect appropriate  Lymph: redness, Right leg swelling > Left leg    LABS: All Labs Reviewed:                        11.4   5.20  )-----------( 238      ( 21 Sep 2021 07:24 )             35.2                         11.3   7.53  )-----------( 247      ( 20 Sep 2021 07:18 )             34.6                         12.1   9.39  )-----------( 279      ( 19 Sep 2021 22:46 )             36.8     21 Sep 2021 07:24    142    |  108    |  17     ----------------------------<  125    4.4     |  31     |  1.20   20 Sep 2021 07:18    141    |  105    |  23     ----------------------------<  61     3.7     |  29     |  1.10   19 Sep 2021 22:46    138    |  105    |  29     ----------------------------<  102    4.2     |  27     |  1.50     Ca    8.4        21 Sep 2021 07:24  Ca    8.2        20 Sep 2021 07:18  Ca    8.4        19 Sep 2021 22:46    TPro  6.8    /  Alb  2.7    /  TBili  0.5    /  DBili  x      /  AST  20     /  ALT  20     /  AlkPhos  84     20 Sep 2021 07:18  TPro  7.7    /  Alb  3.2    /  TBili  0.6    /  DBili  x      /  AST  40     /  ALT  25     /  AlkPhos  94     19 Sep 2021 22:46    PT/INR - ( 19 Sep 2021 22:46 )   PT: 12.9 sec;   INR: 1.11 ratio         PTT - ( 19 Sep 2021 22:46 )  PTT:31.8 sec    Lactate, Blood: 0.3 mmol/L (09-19-21 @ 22:46)    12 Lead ECG:   Ventricular Rate 71 BPM  Atrial Rate 71 BPM  P-R Interval 218 ms  QRS Duration 88 ms  Q-T Interval 388 ms  QTC Calculation(Bazett) 421 ms  R Axis -3 degrees  T Axis 13 degrees    Diagnosis Line *** Poor data quality, interpretation may be adversely affected  Sinus rhythm with 1st degree AV block    Confirmed by ANDREW WELDON ([id number]) on 9/20/2021 11:42:05 AM (09-19-21 @ 23:57)    < from: CT Foot w/ IV Cont, Right (09.20.21 @ 11:50) >    EXAM:  CT FOOT ONLY IC RT                          [facility]                            PROCEDURE DATE:  09/20/2021          INTERPRETATION:  CT of the right foot    CLINICAL INFORMATION: Right lateral foot injury  TECHNIQUE: Axial CT images were obtained of the right foot with coronal and sagittal reconstructions. Intravenous contrast was administered. Three dimensional reconstructions were performed.    FINDINGS:    No acute fracture or dislocation. Cystic changes are present within the distal navicular, likely secondary to overlying focal chondral loss. Suture anchors present within the medial navicular. There are no osseous erosions or periosteal reaction. Soft tissue irregularity is present along the lateralaspect at the level of the proximal/mid fifth metatarsal. No well-circumscribed fluid collection is appreciated. There is diffuse muscle atrophy. There is diffuse subcutaneous edema about the foot/ankle.    IMPRESSION:    No CT evidence of osteomyelitis, particularly of the proximal fifth metatarsal.  Lateral soft tissue wound at the level of the fifth metatarsal without discrete fluid collection.    If clinical suspicion persists for osteomyelitis, MRI or nuclear imaging can be performed if able.    --- End of Report ---            JERICA LOJA MD; Attending Radiologist  This document has been electronically signed. Sep 20 2021  1:08PM    < end of copied text >  < from: CT 3D Reconstruct w/o Workstation (09.20.21 @ 11:49) >    EXAM:  CT FOOT ONLY IC RT                          EXAM:  CT 3D RECONSTRUCT WO [facility]                            PROCEDURE DATE:  09/20/2021          INTERPRETATION:  CT of the right foot    CLINICAL INFORMATION: Right lateral foot injury  TECHNIQUE: Axial CT images were obtained of the right foot with coronal and sagittal reconstructions. Intravenous contrast was administered. Three dimensional reconstructions were performed.    FINDINGS:    No acute fracture or dislocation. Cystic changes are present within the distal navicular, likely secondary to overlying focal chondral loss. Suture anchors present within the medial navicular. There are no osseous erosions or periosteal reaction. Soft tissue irregularity is present along the lateralaspect at the level of the proximal/mid fifth metatarsal. No well-circumscribed fluid collection is appreciated. There is diffuse muscle atrophy. There is diffuse subcutaneous edema about the foot/ankle.    IMPRESSION:    No CT evidence of osteomyelitis, particularly of the proximal fifth metatarsal.  Lateral soft tissue wound at the level of the fifth metatarsal without discrete fluid collection.    If clinical suspicion persists for osteomyelitis, MRI or nuclear imaging can be performed if able.    --- End of Report ---            JERICA LOJA MD; Attending Radiologist  This document has been electronically signed. Sep 20 2021  1:08PM    < end of copied text >  < from: TTE with Doppler (w/Cont) (07.27.17 @ 09:29) >    Patient name: LUTHER NORIEGA  YOB: 1965   Age: 51 (M)   MR#: [id number]  Study Date: 7/27/2017  Location: O/PSonographer: Susie Zuleta RDCS  Study quality: Technically difficult  Referring Physician: Kashmir Ochoa MD  Blood Pressure: 150/77 mmHg  Height: 185 cm  Weight: 136 kg  BSA: 2.6 m2  ------------------------------------------------------------------------  PROCEDURE: Transthoracic echocardiogram with 2-D, M-Mode  and complete spectral and color flow Doppler. Intravenous  catheter inserted. Verbal consent was obtained for  injection of echo contrast following a discussion of risks  and benefits. Following intravenous injection of contrast,  harmonic imaging was performed.  INDICATION: Primary pulmonary hypertension(I27.0)  ------------------------------------------------------------------------  Dimensions:    Normal Values:  LA:     4.0    2.0 - 4.0 cm  Ao:     3.5    2.0 - 3.8 cm  SEPTUM: 1.1    0.6 - 1.2 cm  PWT:    1.2    0.6 - 1.1 cm  LVIDd:  4.9    3.0 -5.6 cm  LVIDs:  3.3    1.8 - 4.0 cm  Derived variables:  LVMI: 83 g/m2  RWT: 0.48  Fractional short: 33 %  EF (Teicholtz): 61 %  ------------------------------------------------------------------------  Observations:  Mitral Valve: Normal mitral valve. Minimal mitral  regurgitation.  Aortic Valve/Aorta: Normal trileaflet aortic valve.  Aortic Root: 3.5 cm.  Left Atrium: Normal left atrium.  LA volume index = 20  cc/m2.  Left Ventricle: Normal left ventricular systolic function.  No segmental wallmotion abnormalities. Endocardial  visualization enhanced with intravenous injection of echo  contrast (Definity). Increased relative wall thickness with  normal left ventricular mass index, consistent with  concentric left ventricular remodeling.  Right Heart: Normal right atrium. Right ventricular  enlargement with normal right ventricular systolic  function. Normal tricuspid valve. Minimal tricuspid  regurgitation. Normal pulmonic valve.  Pericardium/Pleura: Normal pericardium with no pericardial  effusion.  Hemodynamic: Estimated right atrial pressure is 8 mm Hg.  Estimated right ventricular systolic pressure equals 29 mm  Hg, assuming right atrial pressure equals 8 mm Hg,  consistent with normal pulmonary pressures.  ------------------------------------------------------------------------  Conclusions:  1. Increased relative wall thickness with normal left  ventricular mass index, consistent with concentric left  ventricular remodeling.  2. Normal left ventricular systolic function. No segmental  wall motion abnormalities. Endocardial visualization  enhanced with intravenous injection of echo contrast  (Definity).  ------------------------------------------------------------------------  Confirmed on  7/27/2017 - 13:49:24 by RASHEED Del Cid  ------------------------------------------------------------------------    < end of copied text >

## 2021-09-21 NOTE — PROGRESS NOTE ADULT - ASSESSMENT
Patient is a 55 y old M with PMHx of CKD s/p renal transplant, CVA, DM type 1 (on insulin pump), neuropathy, DVT, HLD, Hypertension, Obesity, CARMEN (On nocturnal NIPPV), Restrictive Lung Disease, squamous cell carcinoma presents to ED for redness/swelling R leg. 3 days ago patient cut his foot, initially there was discharge, however that stopped within the first day. Patient states that he began to see swelling, redness, and felt 5/10 throbbing non-radiating pain in RLE. He has had a history of osteomyelitis before on the same foot, was treated with IV ceftriaxone. Denies chest pain, palpitations, dyspnea, headache, dizziness, abdominal pain, n/v/d. He is a renal transplant patient on anti-rejection medication - takes tacrolimus at 930AM and 930PM. Follows with nephrology Dr. Weber and states his baseline creatinine is 1.2.      1-  renal transplant continue   tacrolimus 1.5 milliGRAM(s) Oral two times a day  azaTHIOprine 100 milliGRAM(s) Oral daily    2- htn   metoprolol tartrate 75 milliGRAM(s) Oral two times a day  metoprolol tartrate 75 milliGRAM(s) Oral two times a day    3- cellulitis   piperacillin/tazobactam IVPB.. 3.375 Gram(s) IV Intermittent every    4- Anemia.   continue to trend h/h  -transfuse of s&s of blood loss or if hgb <7.

## 2021-09-21 NOTE — PROGRESS NOTE ADULT - SUBJECTIVE AND OBJECTIVE BOX
CAPILLARY BLOOD GLUCOSE      POCT Blood Glucose.: 125 mg/dL (21 Sep 2021 07:25)  POCT Blood Glucose.: 125 mg/dL (21 Sep 2021 05:54)  POCT Blood Glucose.: 266 mg/dL (20 Sep 2021 21:43)  POCT Blood Glucose.: 173 mg/dL (20 Sep 2021 16:53)  POCT Blood Glucose.: 154 mg/dL (20 Sep 2021 12:09)      Vital Signs Last 24 Hrs  T(C): 36.4 (21 Sep 2021 04:35), Max: 36.9 (20 Sep 2021 17:08)  T(F): 97.5 (21 Sep 2021 04:35), Max: 98.5 (20 Sep 2021 21:43)  HR: 55 (21 Sep 2021 06:14) (51 - 64)  BP: 131/69 (21 Sep 2021 04:35) (113/58 - 144/66)  BP(mean): --  RR: 20 (21 Sep 2021 04:35) (19 - 20)  SpO2: 98% (21 Sep 2021 04:35) (91% - 98%)    Respiratory: CTA B/L  CV: RRR, S1S2, no murmurs, rubs or gallops  Abdominal: Soft, NT, ND +BS, Last BM  Extremities: le erythema     09-21    142  |  108  |  17  ----------------------------<  125<H>  4.4   |  31  |  1.20    Ca    8.4<L>      21 Sep 2021 07:24    TPro  6.8  /  Alb  2.7<L>  /  TBili  0.5  /  DBili  x   /  AST  20  /  ALT  20  /  AlkPhos  84  09-20      atorvastatin 10 milliGRAM(s) Oral at bedtime  dextrose 40% Gel 15 Gram(s) Oral once  dextrose 50% Injectable 25 Gram(s) IV Push once  dextrose 50% Injectable 12.5 Gram(s) IV Push once  dextrose 50% Injectable 25 Gram(s) IV Push once  glucagon  Injectable 1 milliGRAM(s) IntraMuscular once  insulin lispro (HumaLOG) Pump 1 Each SubCutaneous Continuous Pump

## 2021-09-21 NOTE — PROGRESS NOTE ADULT - SUBJECTIVE AND OBJECTIVE BOX
Patient is a 55y old  Male who presents with a chief complaint of cellulitis (21 Sep 2021 11:27)      INTERVAL HPI/OVERNIGHT EVENTS: Patient seen and examined at the bedside. No acute events overnight. Denies fever, sob, chest pain, myalgia or palpitation.   Patient will go for I & D with     MEDICATIONS  (STANDING):  aspirin enteric coated 81 milliGRAM(s) Oral daily  atorvastatin 10 milliGRAM(s) Oral at bedtime  azaTHIOprine 100 milliGRAM(s) Oral daily  cloNIDine 0.1 milliGRAM(s) Oral two times a day  dextrose 40% Gel 15 Gram(s) Oral once  dextrose 5%. 1000 milliLiter(s) (50 mL/Hr) IV Continuous <Continuous>  dextrose 5%. 1000 milliLiter(s) (100 mL/Hr) IV Continuous <Continuous>  dextrose 50% Injectable 25 Gram(s) IV Push once  dextrose 50% Injectable 12.5 Gram(s) IV Push once  dextrose 50% Injectable 25 Gram(s) IV Push once  famotidine    Tablet 20 milliGRAM(s) Oral daily  gabapentin 300 milliGRAM(s) Oral two times a day  glucagon  Injectable 1 milliGRAM(s) IntraMuscular once  heparin   Injectable 5000 Unit(s) SubCutaneous every 8 hours  hydrALAZINE 25 milliGRAM(s) Oral three times a day  influenza   Vaccine 0.5 milliLiter(s) IntraMuscular once  insulin lispro (HumaLOG) Pump 1 Each SubCutaneous Continuous Pump  metoprolol tartrate 75 milliGRAM(s) Oral two times a day  piperacillin/tazobactam IVPB.. 3.375 Gram(s) IV Intermittent every 8 hours  sodium chloride 0.9%. 1000 milliLiter(s) (80 mL/Hr) IV Continuous <Continuous>  tacrolimus 1.5 milliGRAM(s) Oral two times a day    MEDICATIONS  (PRN):      Allergies    No Known Allergies    Intolerances        REVIEW OF SYSTEMS:  CONSTITUTIONAL: No fever or chills  HEENT:  No headache, no sore throat  RESPIRATORY: No cough, wheezing, or shortness of breath  CARDIOVASCULAR: No chest pain, palpitations  GASTROINTESTINAL: No abd pain, nausea, vomiting, or diarrhea  GENITOURINARY: No dysuria, frequency, or hematuria  NEUROLOGICAL: no focal weakness or dizziness  MUSCULOSKELETAL: no myalgias     Vital Signs Last 24 Hrs  T(C): 36.7 (21 Sep 2021 13:00), Max: 36.9 (20 Sep 2021 17:08)  T(F): 98.1 (21 Sep 2021 13:00), Max: 98.5 (20 Sep 2021 21:43)  HR: 66 (21 Sep 2021 13:00) (51 - 75)  BP: 115/69 (21 Sep 2021 13:00) (115/69 - 165/69)  BP(mean): --  RR: 16 (21 Sep 2021 13:00) (15 - 20)  SpO2: 97% (21 Sep 2021 13:00) (92% - 98%)    PHYSICAL EXAM:  GENERAL: NAD  HEENT:  anicteric, moist mucous membranes  CHEST/LUNG:  CTA b/l, no rales, wheezes, or rhonchi  HEART:  RRR, S1, S2  ABDOMEN:  BS+, soft, nontender, nondistended  EXTREMITIES: no edema, cyanosis, or calf tenderness  NERVOUS SYSTEM: answers questions and follows commands appropriately    LABS:                        11.4   5.20  )-----------( 238      ( 21 Sep 2021 07:24 )             35.2     CBC Full  -  ( 21 Sep 2021 07:24 )  WBC Count : 5.20 K/uL  Hemoglobin : 11.4 g/dL  Hematocrit : 35.2 %  Platelet Count - Automated : 238 K/uL  Mean Cell Volume : 90.7 fl  Mean Cell Hemoglobin : 29.4 pg  Mean Cell Hemoglobin Concentration : 32.4 gm/dL  Auto Neutrophil # : 3.12 K/uL  Auto Lymphocyte # : 1.15 K/uL  Auto Monocyte # : 0.52 K/uL  Auto Eosinophil # : 0.34 K/uL  Auto Basophil # : 0.05 K/uL  Auto Neutrophil % : 60.0 %  Auto Lymphocyte % : 22.1 %  Auto Monocyte % : 10.0 %  Auto Eosinophil % : 6.5 %  Auto Basophil % : 1.0 %    21 Sep 2021 07:24    142    |  108    |  17     ----------------------------<  125    4.4     |  31     |  1.20     Ca    8.4        21 Sep 2021 07:24      PT/INR - ( 19 Sep 2021 22:46 )   PT: 12.9 sec;   INR: 1.11 ratio         PTT - ( 19 Sep 2021 22:46 )  PTT:31.8 sec    CAPILLARY BLOOD GLUCOSE      POCT Blood Glucose.: 166 mg/dL (21 Sep 2021 11:26)  POCT Blood Glucose.: 125 mg/dL (21 Sep 2021 07:25)  POCT Blood Glucose.: 125 mg/dL (21 Sep 2021 05:54)  POCT Blood Glucose.: 266 mg/dL (20 Sep 2021 21:43)  POCT Blood Glucose.: 173 mg/dL (20 Sep 2021 16:53)        Culture - Blood (collected 09-20-21 @ 05:03)  Source: .Blood Blood-Peripheral  Preliminary Report (09-21-21 @ 06:01):    No growth to date.    Culture - Blood (collected 09-20-21 @ 05:03)  Source: .Blood Blood-Peripheral  Preliminary Report (09-21-21 @ 06:01):    No growth to date.        RADIOLOGY & ADDITIONAL TESTS:    Personally reviewed.     Consultant(s) Notes Reviewed:  [x] YES  [ ] NO     Patient is a 55y old  Male who presents with a chief complaint of cellulitis (21 Sep 2021 11:27)      INTERVAL HPI/OVERNIGHT EVENTS: Patient seen and examined at the bedside. No acute events overnight.  Denies fever, cough, sob, chest pain, muscle weakness or loss of sensation. Denies n/v/d/c.   Patient  scheduled for I & D with pulse lavages with Dr. Misty saha.      MEDICATIONS  (STANDING):  aspirin enteric coated 81 milliGRAM(s) Oral daily  atorvastatin 10 milliGRAM(s) Oral at bedtime  azaTHIOprine 100 milliGRAM(s) Oral daily  cloNIDine 0.1 milliGRAM(s) Oral two times a day  dextrose 40% Gel 15 Gram(s) Oral once  dextrose 5%. 1000 milliLiter(s) (50 mL/Hr) IV Continuous <Continuous>  dextrose 5%. 1000 milliLiter(s) (100 mL/Hr) IV Continuous <Continuous>  dextrose 50% Injectable 25 Gram(s) IV Push once  dextrose 50% Injectable 12.5 Gram(s) IV Push once  dextrose 50% Injectable 25 Gram(s) IV Push once  famotidine    Tablet 20 milliGRAM(s) Oral daily  gabapentin 300 milliGRAM(s) Oral two times a day  glucagon  Injectable 1 milliGRAM(s) IntraMuscular once  heparin   Injectable 5000 Unit(s) SubCutaneous every 8 hours  hydrALAZINE 25 milliGRAM(s) Oral three times a day  influenza   Vaccine 0.5 milliLiter(s) IntraMuscular once  insulin lispro (HumaLOG) Pump 1 Each SubCutaneous Continuous Pump  metoprolol tartrate 75 milliGRAM(s) Oral two times a day  piperacillin/tazobactam IVPB.. 3.375 Gram(s) IV Intermittent every 8 hours  sodium chloride 0.9%. 1000 milliLiter(s) (80 mL/Hr) IV Continuous <Continuous>  tacrolimus 1.5 milliGRAM(s) Oral two times a day    MEDICATIONS  (PRN):      Allergies    No Known Allergies    Intolerances        REVIEW OF SYSTEMS:  CONSTITUTIONAL: No fever or chills  HEENT:  No headache, no sore throat  RESPIRATORY: No cough, wheezing, or shortness of breath  CARDIOVASCULAR: No chest pain, palpitations  GASTROINTESTINAL: No abd pain, nausea, vomiting, or diarrhea  GENITOURINARY: No dysuria, frequency, or hematuria  NEUROLOGICAL: no focal weakness or dizziness  MUSCULOSKELETAL: no myalgias     Vital Signs Last 24 Hrs  T(C): 36.7 (21 Sep 2021 13:00), Max: 36.9 (20 Sep 2021 17:08)  T(F): 98.1 (21 Sep 2021 13:00), Max: 98.5 (20 Sep 2021 21:43)  HR: 66 (21 Sep 2021 13:00) (51 - 75)  BP: 115/69 (21 Sep 2021 13:00) (115/69 - 165/69)  BP(mean): --  RR: 16 (21 Sep 2021 13:00) (15 - 20)  SpO2: 97% (21 Sep 2021 13:00) (92% - 98%)    PHYSICAL EXAM:  GENERAL: NAD  HEENT:  anicteric, moist mucous membranes  CHEST/LUNG:  CTA b/l, no rales, wheezes, or rhonchi  HEART:  RRR, S1, S2  ABDOMEN:  BS+, soft, nontender, nondistended  EXTREMITIES: R lower leg redness/ cellulitis and swelling, wound on R lateral plantar surface is wrapped, area dressed with Aquacel ag and dsd.   faint pulses appreciated.   NERVOUS SYSTEM: answers questions and follows commands appropriately    LABS:                        11.4   5.20  )-----------( 238      ( 21 Sep 2021 07:24 )             35.2     CBC Full  -  ( 21 Sep 2021 07:24 )  WBC Count : 5.20 K/uL  Hemoglobin : 11.4 g/dL  Hematocrit : 35.2 %  Platelet Count - Automated : 238 K/uL  Mean Cell Volume : 90.7 fl  Mean Cell Hemoglobin : 29.4 pg  Mean Cell Hemoglobin Concentration : 32.4 gm/dL  Auto Neutrophil # : 3.12 K/uL  Auto Lymphocyte # : 1.15 K/uL  Auto Monocyte # : 0.52 K/uL  Auto Eosinophil # : 0.34 K/uL  Auto Basophil # : 0.05 K/uL  Auto Neutrophil % : 60.0 %  Auto Lymphocyte % : 22.1 %  Auto Monocyte % : 10.0 %  Auto Eosinophil % : 6.5 %  Auto Basophil % : 1.0 %    21 Sep 2021 07:24    142    |  108    |  17     ----------------------------<  125    4.4     |  31     |  1.20     Ca    8.4        21 Sep 2021 07:24      PT/INR - ( 19 Sep 2021 22:46 )   PT: 12.9 sec;   INR: 1.11 ratio         PTT - ( 19 Sep 2021 22:46 )  PTT:31.8 sec    CAPILLARY BLOOD GLUCOSE      POCT Blood Glucose.: 166 mg/dL (21 Sep 2021 11:26)  POCT Blood Glucose.: 125 mg/dL (21 Sep 2021 07:25)  POCT Blood Glucose.: 125 mg/dL (21 Sep 2021 05:54)  POCT Blood Glucose.: 266 mg/dL (20 Sep 2021 21:43)  POCT Blood Glucose.: 173 mg/dL (20 Sep 2021 16:53)        Culture - Blood (collected 09-20-21 @ 05:03)  Source: .Blood Blood-Peripheral  Preliminary Report (09-21-21 @ 06:01):    No growth to date.    Culture - Blood (collected 09-20-21 @ 05:03)  Source: .Blood Blood-Peripheral  Preliminary Report (09-21-21 @ 06:01):    No growth to date.        RADIOLOGY & ADDITIONAL TESTS:  < from: CT Foot w/ IV Cont, Right (09.20.21 @ 11:50) >    IMPRESSION:    No CT evidence of osteomyelitis, particularly of the proximal fifth metatarsal.  Lateral soft tissue wound at the level of the fifth metatarsal without discrete fluid collection.    If clinical suspicion persists for osteomyelitis, MRI or nuclear imaging can be performed if able.    < end of copied text >      Personally reviewed.     Consultant(s) Notes Reviewed:  [x] YES  [ ] NO     Patient is a 55y old  Male who presents with a chief complaint of cellulitis (21 Sep 2021 11:27)      INTERVAL HPI/OVERNIGHT EVENTS: Patient seen and examined at the bedside. No acute events overnight.  Denies fever, cough, sob, chest pain, muscle weakness or loss of sensation. Denies n/v/d/c. Patient  scheduled for I & D with pulse lavages with Dr. Misty saha.      MEDICATIONS  (STANDING):  aspirin enteric coated 81 milliGRAM(s) Oral daily  atorvastatin 10 milliGRAM(s) Oral at bedtime  azaTHIOprine 100 milliGRAM(s) Oral daily  cloNIDine 0.1 milliGRAM(s) Oral two times a day  dextrose 40% Gel 15 Gram(s) Oral once  dextrose 5%. 1000 milliLiter(s) (50 mL/Hr) IV Continuous <Continuous>  dextrose 5%. 1000 milliLiter(s) (100 mL/Hr) IV Continuous <Continuous>  dextrose 50% Injectable 25 Gram(s) IV Push once  dextrose 50% Injectable 12.5 Gram(s) IV Push once  dextrose 50% Injectable 25 Gram(s) IV Push once  famotidine    Tablet 20 milliGRAM(s) Oral daily  gabapentin 300 milliGRAM(s) Oral two times a day  glucagon  Injectable 1 milliGRAM(s) IntraMuscular once  heparin   Injectable 5000 Unit(s) SubCutaneous every 8 hours  hydrALAZINE 25 milliGRAM(s) Oral three times a day  influenza   Vaccine 0.5 milliLiter(s) IntraMuscular once  insulin lispro (HumaLOG) Pump 1 Each SubCutaneous Continuous Pump  metoprolol tartrate 75 milliGRAM(s) Oral two times a day  piperacillin/tazobactam IVPB.. 3.375 Gram(s) IV Intermittent every 8 hours  sodium chloride 0.9%. 1000 milliLiter(s) (80 mL/Hr) IV Continuous <Continuous>  tacrolimus 1.5 milliGRAM(s) Oral two times a day    MEDICATIONS  (PRN):      Allergies    No Known Allergies    Intolerances        REVIEW OF SYSTEMS:  CONSTITUTIONAL: No fever or chills  HEENT:  No headache, no sore throat  RESPIRATORY: No cough, wheezing, or shortness of breath  CARDIOVASCULAR: No chest pain, palpitations  GASTROINTESTINAL: No abd pain, nausea, vomiting, or diarrhea  GENITOURINARY: No dysuria, frequency, or hematuria  NEUROLOGICAL: no focal weakness or dizziness  MUSCULOSKELETAL: no myalgias     Vital Signs Last 24 Hrs  T(C): 36.7 (21 Sep 2021 13:00), Max: 36.9 (20 Sep 2021 17:08)  T(F): 98.1 (21 Sep 2021 13:00), Max: 98.5 (20 Sep 2021 21:43)  HR: 66 (21 Sep 2021 13:00) (51 - 75)  BP: 115/69 (21 Sep 2021 13:00) (115/69 - 165/69)  BP(mean): --  RR: 16 (21 Sep 2021 13:00) (15 - 20)  SpO2: 97% (21 Sep 2021 13:00) (92% - 98%)    PHYSICAL EXAM:  GENERAL: NAD  HEENT:  anicteric, moist mucous membranes  CHEST/LUNG:  CTA b/l, no rales, wheezes, or rhonchi  HEART:  RRR, S1, S2  ABDOMEN:  BS+, soft, nontender, nondistended  EXTREMITIES: R lower leg redness/ cellulitis and swelling, wound on R lateral plantar surface is wrapped, area dressed with Aquacel ag and dsd.   faint pulses appreciated.   NERVOUS SYSTEM: answers questions and follows commands appropriately    LABS:                        11.4   5.20  )-----------( 238      ( 21 Sep 2021 07:24 )             35.2     CBC Full  -  ( 21 Sep 2021 07:24 )  WBC Count : 5.20 K/uL  Hemoglobin : 11.4 g/dL  Hematocrit : 35.2 %  Platelet Count - Automated : 238 K/uL  Mean Cell Volume : 90.7 fl  Mean Cell Hemoglobin : 29.4 pg  Mean Cell Hemoglobin Concentration : 32.4 gm/dL  Auto Neutrophil # : 3.12 K/uL  Auto Lymphocyte # : 1.15 K/uL  Auto Monocyte # : 0.52 K/uL  Auto Eosinophil # : 0.34 K/uL  Auto Basophil # : 0.05 K/uL  Auto Neutrophil % : 60.0 %  Auto Lymphocyte % : 22.1 %  Auto Monocyte % : 10.0 %  Auto Eosinophil % : 6.5 %  Auto Basophil % : 1.0 %    21 Sep 2021 07:24    142    |  108    |  17     ----------------------------<  125    4.4     |  31     |  1.20     Ca    8.4        21 Sep 2021 07:24      PT/INR - ( 19 Sep 2021 22:46 )   PT: 12.9 sec;   INR: 1.11 ratio         PTT - ( 19 Sep 2021 22:46 )  PTT:31.8 sec    CAPILLARY BLOOD GLUCOSE      POCT Blood Glucose.: 166 mg/dL (21 Sep 2021 11:26)  POCT Blood Glucose.: 125 mg/dL (21 Sep 2021 07:25)  POCT Blood Glucose.: 125 mg/dL (21 Sep 2021 05:54)  POCT Blood Glucose.: 266 mg/dL (20 Sep 2021 21:43)  POCT Blood Glucose.: 173 mg/dL (20 Sep 2021 16:53)        Culture - Blood (collected 09-20-21 @ 05:03)  Source: .Blood Blood-Peripheral  Preliminary Report (09-21-21 @ 06:01):    No growth to date.    Culture - Blood (collected 09-20-21 @ 05:03)  Source: .Blood Blood-Peripheral  Preliminary Report (09-21-21 @ 06:01):    No growth to date.        RADIOLOGY & ADDITIONAL TESTS:  < from: CT Foot w/ IV Cont, Right (09.20.21 @ 11:50) >    IMPRESSION:    No CT evidence of osteomyelitis, particularly of the proximal fifth metatarsal.  Lateral soft tissue wound at the level of the fifth metatarsal without discrete fluid collection.    If clinical suspicion persists for osteomyelitis, MRI or nuclear imaging can be performed if able.    < end of copied text >      Personally reviewed.     Consultant(s) Notes Reviewed:  [x] YES  [ ] NO

## 2021-09-21 NOTE — PROGRESS NOTE ADULT - SUBJECTIVE AND OBJECTIVE BOX
[facility] DIVISION of INFECTIOUS DISEASE  Marko Saucedo MD PhD, Qiana Steven MD, Claire Castro MD, Tal Russo MD  and providing coverage with Sabrina Hinton MD and Krista Del Valle MD  Providing Infectious Disease Consultations at [facility], [facility], [facility]'s    Office# [phone] to schedule follow up appointments  Answering Service for urgent calls or New Consults [phone]  Cell# to text for urgent issues Marko Saucedo [phone]     infectious diseases progress note:    LUTHER NORIEGA is a 55y y. o. Male patient    No concerning overnight events    Allergies    No Known Allergies    Intolerances        ANTIBIOTICS/RELEVANT:  antimicrobials  piperacillin/tazobactam IVPB.. 3.375 Gram(s) IV Intermittent every 8 hours    immunologic:  azaTHIOprine 100 milliGRAM(s) Oral daily  influenza   Vaccine 0.5 milliLiter(s) IntraMuscular once  tacrolimus 1.5 milliGRAM(s) Oral two times a day    OTHER:  aspirin enteric coated 81 milliGRAM(s) Oral daily  atorvastatin 10 milliGRAM(s) Oral at bedtime  cloNIDine 0.1 milliGRAM(s) Oral two times a day  dextrose 40% Gel 15 Gram(s) Oral once  dextrose 5%. 1000 milliLiter(s) IV Continuous <Continuous>  dextrose 5%. 1000 milliLiter(s) IV Continuous <Continuous>  dextrose 50% Injectable 25 Gram(s) IV Push once  dextrose 50% Injectable 12.5 Gram(s) IV Push once  dextrose 50% Injectable 25 Gram(s) IV Push once  famotidine    Tablet 20 milliGRAM(s) Oral daily  gabapentin 300 milliGRAM(s) Oral two times a day  glucagon  Injectable 1 milliGRAM(s) IntraMuscular once  heparin   Injectable 5000 Unit(s) SubCutaneous every 8 hours  hydrALAZINE 25 milliGRAM(s) Oral three times a day  insulin lispro (HumaLOG) Pump 1 Each SubCutaneous Continuous Pump  metoprolol tartrate 75 milliGRAM(s) Oral two times a day  sodium chloride 0.9%. 1000 milliLiter(s) IV Continuous <Continuous>      Objective:  Vital Signs Last 24 Hrs  T(C): 36.4 (21 Sep 2021 11:13), Max: 36.9 (20 Sep 2021 17:08)  T(F): 97.5 (21 Sep 2021 11:13), Max: 98.5 (20 Sep 2021 21:43)  HR: 75 (21 Sep 2021 11:13) (51 - 75)  BP: 170/63 (21 Sep 2021 11:13) (113/58 - 170/63)  BP(mean): --  RR: 20 (21 Sep 2021 04:35) (19 - 20)  SpO2: 92% (21 Sep 2021 11:13) (91% - 98%)    T(C): 36.4 (09-21-21 @ 11:13), Max: 36.9 (09-20-21 @ 01:22)  T(C): 36.4 (09-21-21 @ 11:13), Max: 36.9 (09-20-21 @ 01:22)  T(C): 36.4 (09-21-21 @ 11:13), Max: 36.9 (09-20-21 @ 01:22)    PHYSICAL EXAM:  HEENT: NC atraumatic  Neck: supple  Respiratory: no accessory muscle use, breathing comfortably  Cardiovascular: distant  Gastrointestinal: normal appearing, nondistended  Extremities: no clubbing, no cyanosis, RLE with decreased erythema and swelling        LABS:                          11.4   5.20  )-----------( 238      ( 21 Sep 2021 07:24 )             35.2       5.20 09-21 @ 07:24  7.53 09-20 @ 07:18  9.39 09-19 @ 22:46      09-21    142  |  108  |  17  ----------------------------<  125<H>  4.4   |  31  |  1.20    Ca    8.4<L>      21 Sep 2021 07:24    TPro  6.8  /  Alb  2.7<L>  /  TBili  0.5  /  DBili  x   /  AST  20  /  ALT  20  /  AlkPhos  84  09-20      Creatinine, Serum: 1.20 mg/dL (09-21-21 @ 07:24)  Creatinine, Serum: 1.10 mg/dL (09-20-21 @ 07:18)  Creatinine, Serum: 1.50 mg/dL (09-19-21 @ 22:46)      PT/INR - ( 19 Sep 2021 22:46 )   PT: 12.9 sec;   INR: 1.11 ratio         PTT - ( 19 Sep 2021 22:46 )  PTT:31.8 sec          INFLAMMATORY MARKERS  Auto Neutrophil #: 3.12 K/uL (09-21-21 @ 07:24)  Auto Lymphocyte #: 1.15 K/uL (09-21-21 @ 07:24)  Auto Neutrophil #: 5.20 K/uL (09-20-21 @ 07:18)  Auto Lymphocyte #: 1.18 K/uL (09-20-21 @ 07:18)  Auto Neutrophil #: 6.78 K/uL (09-19-21 @ 22:46)  Auto Lymphocyte #: 1.39 K/uL (09-19-21 @ 22:46)    Lactate, Blood: 0.3 mmol/L (09-19-21 @ 22:46)    Auto Eosinophil #: 0.34 K/uL (09-21-21 @ 07:24)  Auto Eosinophil #: 0.27 K/uL (09-20-21 @ 07:18)  Auto Eosinophil #: 0.24 K/uL (09-19-21 @ 22:46)      Sedimentation Rate, Erythrocyte: 44 mm/hr (09-20-21 @ 07:18)    Procalcitonin, Serum: <0.05 (09-19-21 @ 22:46)                  Activated Partial Thromboplastin Time: 31.8 sec (09-19-21 @ 22:46)  INR: 1.11 ratio (09-19-21 @ 22:46)          MICROBIOLOGY:    MRSA/MSSA PCR (09.20.21 @ 11:36)    MRSA PCR Result.: NotDetec:     Staph Aureus PCR Result: NotDetec        RADIOLOGY & ADDITIONAL STUDIES:

## 2021-09-21 NOTE — PROGRESS NOTE ADULT - ASSESSMENT
55 y old M with PMH of CKD s/p renal transplant, CVA, DM type 1 (on insulin pump), neuropathy, DVT, HLD, Hypertension, Obesity, CARMEN (On nocturnal NIPPV), Restrictive Lung Disease, squamous cell carcinoma presents to ED for redness/swelling R leg. Admitted for RLE cellulitis and possible osteomyelitis.     Problem/Plan - 1:  Cellulitis.   - Venous doppler:  negative   - Blood cx: NGTD  - MRSA PCR: negative  - Medically optimized for Scheduled I & D with pulse lavage with Dr. Jay today.  - Consulted ID (Dr. Saucedo)- as Pt has DM and is on immune suppressive meds for renal tx, so continue broad antibx zosyn 9/20-first full day so - to complete 7 days. With significant improvement suspect we will  be able to dc 9/22 on Vantin 200mg PO BID with last day 9/26  - will order non invasive vascular studies, patient right is non palpable PT and faintly palpable DP       Problem/Plan - 2: Diabetes mellitus- type 1  -Patient has history of DM1 on insulin pump  -At home: insulin pump Humalog 100 units: 3 units/hour for 72 total units, continue home regimen  -hypoglycemia protocols in place  - HA1c 7.8   - Dr. Bryant following.     Problem/Plan - 3:  ·  Problem: Anemia.   - On admission patient h/h 12.1/36.8, likely due to longstanding CKD--> renal transplant  - h/h is 11.4, asymptomatic, likely dilutional as all cell lines are low.  -continue to trend h/h  -transfuse of s&s of blood loss or if hgb <7.     Problem/Plan - 4:  ·  Problem: Obstructive sleep apnea.   -Patient has history of CARMEN 2/2 to obesity   -Patient on NIPPV at home  -on BIPAP here at night.       Problem/Plan - 5:  ·  Problem: Renal transplant recipient.   - Patient received right renal transplant 2013 for CKD  - Cr on admission 1.5, baseline of 1.2 per patient - will hold ARB for now  - c/w azathioprine 100 mg daily and tacrolimus 1.5 mg BID  - check tacrolimus level 30 min before administration of medication  - patient takes tacrolimus 9:30 AM and 9:30 PM  - Consulted Nephro (Dr. Bryant)- following     Problem/Plan - 6:  ·  Problem: HTN (hypertension).   - Chronic  - Presented to the ED with /96--> 117/64  - continue with home clonidine 0.1 mg BID, Hydralazine 25 mg TID, Lopressor 75mg BID  - holding home losartan 25 mg q daily in setting of Cr bump  - hold parameters in place.       Problem/Plan - 7:  - HLD (hyperlipidemia).   - Chronic  -Therapeutic interchange of home lovastatin 40 mg to atorvastatin 10 mg  -DASH/ TLC diet, consistent carb.     Problem/Plan - 8:  - Need for prophylactic measure.   - heparin 5000 units q8. 55 y old M with PMH of CKD s/p renal transplant, CVA, DM type 1 (on insulin pump), neuropathy, DVT, HLD, Hypertension, Obesity, CARMEN (On nocturnal NIPPV), Restrictive Lung Disease, squamous cell carcinoma presents to ED for redness/swelling R leg. Admitted for RLE cellulitis.     Problem/Plan - 1:  Cellulitis.   - Venous doppler:  negative   - Blood cx: NGTD  - MRSA PCR: negative  - Medically optimized for Scheduled I & D with pulse lavage with Dr. Jay today.  - Consulted ID (Dr. Saucedo)- as Pt has DM and is on immune suppressive meds for renal tx, so continue broad antibx zosyn 9/20-first full day so - to complete 7 days. With significant improvement suspect we will  be able to dc 9/22 on Vantin 200mg PO BID with last day 9/26  - will order non invasive vascular studies, patient right is non palpable PT and faintly palpable DP       Problem/Plan - 2: Diabetes mellitus- type 1  -Patient has history of DM1 on insulin pump  -At home: insulin pump Humalog 100 units: 3 units/hour for 72 total units, continue home regimen  -hypoglycemia protocols in place  - HA1c 7.8   - Dr. Bryant following.     Problem/Plan - 3:  ·  Problem: Anemia.   - On admission patient h/h 12.1/36.8, likely due to longstanding CKD--> renal transplant  - h/h is 11.4, asymptomatic, likely dilutional as all cell lines are low.  -continue to trend h/h  -transfuse of s&s of blood loss or if hgb <7.     Problem/Plan - 4:  ·  Problem: Obstructive sleep apnea.   -Patient has history of CARMEN 2/2 to obesity   -Patient on NIPPV at home  -on BIPAP here at night.       Problem/Plan - 5:  ·  Problem: Renal transplant recipient.   - Patient received right renal transplant 2013 for CKD  - Cr on admission 1.5, baseline of 1.2 per patient - will hold ARB for now  - c/w azathioprine 100 mg daily and tacrolimus 1.5 mg BID  - check tacrolimus level 30 min before administration of medication  - patient takes tacrolimus 9:30 AM and 9:30 PM  - Consulted Nephro (Dr. Bryant)- following     Problem/Plan - 6:  ·  Problem: HTN (hypertension).   - Chronic  - Presented to the ED with /96--> 117/64  - continue with home clonidine 0.1 mg BID, Hydralazine 25 mg TID, Lopressor 75mg BID  - holding home losartan 25 mg q daily in setting of Cr bump  - hold parameters in place.       Problem/Plan - 7:  - HLD (hyperlipidemia).   - Chronic  -Therapeutic interchange of home lovastatin 40 mg to atorvastatin 10 mg  -DASH/ TLC diet, consistent carb.     Problem/Plan - 8:  - Need for prophylactic measure.   - heparin 5000 units q8.

## 2021-09-21 NOTE — PROGRESS NOTE ADULT - SUBJECTIVE AND OBJECTIVE BOX
Patient is a 55y Male whom presented to the hospital with s/p kidney transplant     PAST MEDICAL & SURGICAL HISTORY:  Hypertension    Hyperlipidemia    Diabetes mellitus  Type 1 on insulin pump    CKD (chronic kidney disease)  Renal Transplant 2013 at [facility]    Diabetic peripheral neuropathy    Obesity (BMI 30-39.9)    CVA (cerebral vascular accident)  Wallenberg Stroke  low L body sensation  low R face sensation  decreased peripheral vision  poor balance    Squamous cell carcinoma of skin of left ear    History of DVT (deep vein thrombosis)    Recurrent squamous cell carcinoma of skin  nose, L arm    Toe infection  2007 L 4th Toe surgery-amputation secondary to osteomyelitis    Ear disease  Left inner ear surgery, 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, Can NOT have MRI    Vasectomy status  s/p b/l  vasectomy    H/O foot surgery  2005 - right foot - bone fracture - s/p ORIF and subsequent removal of hardware    End-stage renal failure with renal transplant  12/2013    S/P kidney transplant    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        MEDICATIONS  (STANDING):  aspirin enteric coated 81 milliGRAM(s) Oral daily  atorvastatin 10 milliGRAM(s) Oral at bedtime  azaTHIOprine 100 milliGRAM(s) Oral daily  cloNIDine 0.1 milliGRAM(s) Oral two times a day  dextrose 40% Gel 15 Gram(s) Oral once  dextrose 50% Injectable 25 Gram(s) IV Push once  famotidine    Tablet 20 milliGRAM(s) Oral daily  gabapentin 300 milliGRAM(s) Oral two times a day  glucagon  Injectable 1 milliGRAM(s) IntraMuscular once  heparin   Injectable 5000 Unit(s) SubCutaneous every 8 hours  hydrALAZINE 25 milliGRAM(s) Oral three times a day  influenza   Vaccine 0.5 milliLiter(s) IntraMuscular once  insulin lispro (HumaLOG) Pump 1 Each SubCutaneous Continuous Pump  lactated ringers. 1000 milliLiter(s) (75 mL/Hr) IV Continuous <Continuous>  metoprolol tartrate 75 milliGRAM(s) Oral two times a day  piperacillin/tazobactam IVPB.. 3.375 Gram(s) IV Intermittent every 8 hours  sodium chloride 0.9%. 1000 milliLiter(s) (80 mL/Hr) IV Continuous <Continuous>  tacrolimus 1.5 milliGRAM(s) Oral two times a day      Allergies    No Known Allergies    Intolerances        SOCIAL HISTORY:  Denies ETOh,Smoking,     FAMILY HISTORY:  Family history of diabetes mellitus (DM)    FH: skin cancer        REVIEW OF SYSTEMS:    CONSTITUTIONAL: No weakness, fevers or chills  RESPIRATORY: No cough, wheezing, hemoptysis; No shortness of breath  CARDIOVASCULAR: No chest pain or palpitations  GASTROINTESTINAL: No abdominal or epigastric pain. No nausea, vomiting,     No diarrhea or constipation. No melena   GENITOURINARY: No dysuria, frequency or hematuria  NEUROLOGICAL: No numbness or weakness  SKIN: redness/swelling R leg.    VITAL:  T(C): , Max: 36.9 (09-20-21 @ 21:43)  T(F): , Max: 98.5 (09-20-21 @ 21:43)  HR: 71 (09-21-21 @ 18:20)  BP: 144/71 (09-21-21 @ 18:20)  BP(mean): --  RR: 15 (09-21-21 @ 18:20)  SpO2: 97% (09-21-21 @ 18:20)  Wt(kg): --    I and O's:    09-20 @ 07:01  -  09-21 @ 07:00  --------------------------------------------------------  IN: 2180 mL / OUT: 0 mL / NET: 2180 mL    09-21 @ 07:01  -  09-21 @ 19:12  --------------------------------------------------------  IN: 540 mL / OUT: 0 mL / NET: 540 mL      Height (cm): 185.4 (09-21 @ 13:15)  Weight (kg): 136.1 (09-21 @ 13:15)  BMI (kg/m2): 39.6 (09-21 @ 13:15)  BSA (m2): 2.56 (09-21 @ 13:15)    PHYSICAL EXAM:    Constitutional: NAD  HEENT: conjunctive   clear   Neck:  No JVD  Respiratory: CTAB  Cardiovascular: S1 and S2  Gastrointestinal: BS+, soft, NT/ND  Extremities: Erythema and edema of RLE    LABS:                        11.4   5.20  )-----------( 238      ( 21 Sep 2021 07:24 )             35.2     09-21    142  |  108  |  17  ----------------------------<  125<H>  4.4   |  31  |  1.20    Ca    8.4<L>      21 Sep 2021 07:24    TPro  6.8  /  Alb  2.7<L>  /  TBili  0.5  /  DBili  x   /  AST  20  /  ALT  20  /  AlkPhos  84  09-20      Urine Studies:          RADIOLOGY & ADDITIONAL STUDIES:

## 2021-09-21 NOTE — PROGRESS NOTE ADULT - ASSESSMENT
55m with h/o renal transplant immune suppressed on bactrim ppx, dm, htn, hld, aida, rle osteomyelitis of his 2 and 3 digits requiring amputation  here with rle swelling redness/pain following trauma  appears to be nonpurulent cellulitis     RECOMMENDATIONS  no dvt   ct without deep infection/no om and timing of 4 days so doubt osteomyelitis  no fever,   sed rate 44-   blood cx-NGTD  neg mrsa screen- not purulent so low concern  keep elevated  pt with dm and immune suppressive meds for renal tx so cont broad antibx zosyn 9/20-first full day so - to complete 7 days   with significant improvement suspect we will will be able to dc 9/22 on Vantin 200mg PO BID with last day 9/26    bactrim ppx resume on dc    We will follow along in the care of this patient. Please call us at [phone] or text me directly on my cell# at [phone] with any concerns.

## 2021-09-21 NOTE — PROGRESS NOTE ADULT - ASSESSMENT
Patient is a 55 y old M with PMHx of CKD s/p renal transplant, CVA, s/p negative ILR monitor, never removed, DM type 1 (on insulin pump), neuropathy, DVT, HLD, Hypertension, Obesity, CARMEN (On nocturnal NIPPV), Restrictive Lung Disease, squamous cell carcinoma presents to ED for redness/swelling R leg, for I and D under local anesthesia:    Cellulitis R Leg  - p/w RLE below the knee swelling, erythema, pain for 3 days after having a cut on his right foot. Suspect cellulitis and underlying osteomyelitis  - planned for Right leg I and D under local anesthesia today   - Duplex US: No evidence of right lower extremity DVT. Subcutaneous edema is visualized throughout the right calf.  - CT Right foot: No CT evidence of osteomyelitis, particularly of the proximal fifth metatarsal. Lateral soft tissue wound at the level of the fifth metatarsal without discrete fluid collection.  - Optimized for the OR from a cardiac point of view with no evidence of active ischemic heart disease, decompensated heart failure, severe obstructive valvular disease, or uncontrolled arrhythmia.  - TTE: EF 61%, normal LVSF, No segmental WMA, increased relative wall thickness with normal ventricular mass index, consistent with concentric left ventricular remodeling   - H/o CAD. No active cardiac condition.   - Denies chest pain, palpitation, SOB, syncope, dizziness, lightheadedness, orthopnea, PND, WOOTEN and edema  - EKG demonstrates NSR. No acute ischemic changes noted.   - Patient euvolemic on examination with no overt signs of heart failure or cardiac ischemia.   - No severe valvular abnormalities noted on examination  - There is no cardiac contraindication to proceeding with the planned surgery.   - Routine hemodynamic monitoring recommended.   - Pt has no active ischemia, decompensated heart failure, unstable arrythmia, or severe stenotic valvular disease. Patient is optimized from cardiovascular standpoint to proceed with planned procedure with routine hemodynamic monitoring.  - Continue aspirin 81 qd.  Can be held if needed for procedure  - Continue statin   - Continue clonidine 0.1 bid  - Continue hydralazine 25 bid  - Continue metoprolol 75 bid  - Monitor and replete lytes     Patient is a 55 y old M with PMHx of CKD s/p renal transplant, CVA, s/p negative ILR monitor, never removed, DM type 1 (on insulin pump), neuropathy, DVT, HLD, Hypertension, Obesity, CARMEN (On nocturnal NIPPV), Restrictive Lung Disease, squamous cell carcinoma presents to ED for redness/swelling R leg, for I and D under local anesthesia:    Cellulitis R Leg  - p/w RLE below the knee swelling, erythema, pain for 3 days after having a cut on his right foot. Suspect cellulitis and underlying osteomyelitis  - planned for Right leg I and D under local anesthesia today   - Duplex US: No evidence of right lower extremity DVT. Subcutaneous edema is visualized throughout the right calf.  - CT Right foot: No CT evidence of osteomyelitis, particularly of the proximal fifth metatarsal. Lateral soft tissue wound at the level of the fifth metatarsal without discrete fluid collection.  - Optimized for the OR from a cardiac point of view with no evidence of active ischemic heart disease, decompensated heart failure, severe obstructive valvular disease, or uncontrolled arrhythmia.  - TTE: EF 61%, normal LVSF, No segmental WMA, increased relative wall thickness with normal ventricular mass index, consistent with concentric left ventricular remodeling   - Denies chest pain, palpitation, SOB, syncope, dizziness, lightheadedness, orthopnea, PND, WOOTEN and edema  - EKG demonstrates NSR. No acute ischemic changes noted.   - Patient euvolemic on examination with no overt signs of heart failure or cardiac ischemia.   - No severe valvular abnormalities noted on examination  - There is no cardiac contraindication to proceeding with the planned surgery.   - Routine hemodynamic monitoring recommended.   - Pt has no active ischemia, decompensated heart failure, unstable arrythmia, or severe stenotic valvular disease. Patient is optimized from cardiovascular standpoint to proceed with planned procedure with routine hemodynamic monitoring.  - Continue aspirin 81 qd.  Can be held if needed for procedure    HTN  - BP: 131/69 (09-21-21 @ 04:35) (113/58 - 144/66)  - Continue clonidine 0.1 bid  - Continue hydralazine 25 bid  - Continue metoprolol 75 bid    HLD  - continue statin     - All other medical needs as per primary team.  - Other cardiovascular workup will depend on clinical course.  - Will continue to follow.    [facility] [employer]  Nurse Practitioner - Cardiology   Spectra #[id number]/ [phone]

## 2021-09-22 ENCOUNTER — TRANSCRIPTION ENCOUNTER (OUTPATIENT)
Age: 56
End: 2021-09-22

## 2021-09-22 VITALS — HEART RATE: 70 BPM | DIASTOLIC BLOOD PRESSURE: 70 MMHG | SYSTOLIC BLOOD PRESSURE: 145 MMHG

## 2021-09-22 DIAGNOSIS — E11.621 TYPE 2 DIABETES MELLITUS WITH FOOT ULCER: ICD-10-CM

## 2021-09-22 DIAGNOSIS — E10.8 TYPE 1 DIABETES MELLITUS WITH UNSPECIFIED COMPLICATIONS: ICD-10-CM

## 2021-09-22 LAB
ANION GAP SERPL CALC-SCNC: 2 MMOL/L — LOW (ref 5–17)
BASOPHILS # BLD AUTO: 0.05 K/UL — SIGNIFICANT CHANGE UP (ref 0–0.2)
BASOPHILS NFR BLD AUTO: 1 % — SIGNIFICANT CHANGE UP (ref 0–2)
BUN SERPL-MCNC: 13 MG/DL — SIGNIFICANT CHANGE UP (ref 7–23)
CALCIUM SERPL-MCNC: 9.1 MG/DL — SIGNIFICANT CHANGE UP (ref 8.5–10.1)
CHLORIDE SERPL-SCNC: 106 MMOL/L — SIGNIFICANT CHANGE UP (ref 96–108)
CO2 SERPL-SCNC: 35 MMOL/L — HIGH (ref 22–31)
CREAT SERPL-MCNC: 1 MG/DL — SIGNIFICANT CHANGE UP (ref 0.5–1.3)
EOSINOPHIL # BLD AUTO: 0.24 K/UL — SIGNIFICANT CHANGE UP (ref 0–0.5)
EOSINOPHIL NFR BLD AUTO: 5 % — SIGNIFICANT CHANGE UP (ref 0–6)
GLUCOSE SERPL-MCNC: 48 MG/DL — CRITICAL LOW (ref 70–99)
GRAM STN FLD: SIGNIFICANT CHANGE UP
HCT VFR BLD CALC: 37 % — LOW (ref 39–50)
HGB BLD-MCNC: 11.9 G/DL — LOW (ref 13–17)
IMM GRANULOCYTES NFR BLD AUTO: 0.2 % — SIGNIFICANT CHANGE UP (ref 0–1.5)
LYMPHOCYTES # BLD AUTO: 0.97 K/UL — LOW (ref 1–3.3)
LYMPHOCYTES # BLD AUTO: 20.3 % — SIGNIFICANT CHANGE UP (ref 13–44)
MCHC RBC-ENTMCNC: 29.2 PG — SIGNIFICANT CHANGE UP (ref 27–34)
MCHC RBC-ENTMCNC: 32.2 GM/DL — SIGNIFICANT CHANGE UP (ref 32–36)
MCV RBC AUTO: 90.9 FL — SIGNIFICANT CHANGE UP (ref 80–100)
MONOCYTES # BLD AUTO: 0.36 K/UL — SIGNIFICANT CHANGE UP (ref 0–0.9)
MONOCYTES NFR BLD AUTO: 7.5 % — SIGNIFICANT CHANGE UP (ref 2–14)
NEUTROPHILS # BLD AUTO: 3.15 K/UL — SIGNIFICANT CHANGE UP (ref 1.8–7.4)
NEUTROPHILS NFR BLD AUTO: 66 % — SIGNIFICANT CHANGE UP (ref 43–77)
NRBC # BLD: 0 /100 WBCS — SIGNIFICANT CHANGE UP (ref 0–0)
PLATELET # BLD AUTO: 270 K/UL — SIGNIFICANT CHANGE UP (ref 150–400)
POTASSIUM SERPL-MCNC: 4.5 MMOL/L — SIGNIFICANT CHANGE UP (ref 3.5–5.3)
POTASSIUM SERPL-SCNC: 4.5 MMOL/L — SIGNIFICANT CHANGE UP (ref 3.5–5.3)
RBC # BLD: 4.07 M/UL — LOW (ref 4.2–5.8)
RBC # FLD: 13.1 % — SIGNIFICANT CHANGE UP (ref 10.3–14.5)
SODIUM SERPL-SCNC: 143 MMOL/L — SIGNIFICANT CHANGE UP (ref 135–145)
SPECIMEN SOURCE: SIGNIFICANT CHANGE UP
TACROLIMUS SERPL-MCNC: 5.8 NG/ML — SIGNIFICANT CHANGE UP
WBC # BLD: 4.78 K/UL — SIGNIFICANT CHANGE UP (ref 3.8–10.5)
WBC # FLD AUTO: 4.78 K/UL — SIGNIFICANT CHANGE UP (ref 3.8–10.5)

## 2021-09-22 PROCEDURE — 93971 EXTREMITY STUDY: CPT

## 2021-09-22 PROCEDURE — 99285 EMERGENCY DEPT VISIT HI MDM: CPT

## 2021-09-22 PROCEDURE — 85730 THROMBOPLASTIN TIME PARTIAL: CPT

## 2021-09-22 PROCEDURE — 85025 COMPLETE CBC W/AUTO DIFF WBC: CPT

## 2021-09-22 PROCEDURE — 80197 ASSAY OF TACROLIMUS: CPT

## 2021-09-22 PROCEDURE — 90686 IIV4 VACC NO PRSV 0.5 ML IM: CPT

## 2021-09-22 PROCEDURE — 87077 CULTURE AEROBIC IDENTIFY: CPT

## 2021-09-22 PROCEDURE — U0003: CPT

## 2021-09-22 PROCEDURE — 94760 N-INVAS EAR/PLS OXIMETRY 1: CPT

## 2021-09-22 PROCEDURE — 83605 ASSAY OF LACTIC ACID: CPT

## 2021-09-22 PROCEDURE — U0005: CPT

## 2021-09-22 PROCEDURE — 99232 SBSQ HOSP IP/OBS MODERATE 35: CPT

## 2021-09-22 PROCEDURE — 87640 STAPH A DNA AMP PROBE: CPT

## 2021-09-22 PROCEDURE — 87641 MR-STAPH DNA AMP PROBE: CPT

## 2021-09-22 PROCEDURE — 94660 CPAP INITIATION&MGMT: CPT

## 2021-09-22 PROCEDURE — 88304 TISSUE EXAM BY PATHOLOGIST: CPT

## 2021-09-22 PROCEDURE — 80048 BASIC METABOLIC PNL TOTAL CA: CPT

## 2021-09-22 PROCEDURE — 80053 COMPREHEN METABOLIC PANEL: CPT

## 2021-09-22 PROCEDURE — 85610 PROTHROMBIN TIME: CPT

## 2021-09-22 PROCEDURE — 71045 X-RAY EXAM CHEST 1 VIEW: CPT

## 2021-09-22 PROCEDURE — 76376 3D RENDER W/INTRP POSTPROCES: CPT

## 2021-09-22 PROCEDURE — 97162 PT EVAL MOD COMPLEX 30 MIN: CPT

## 2021-09-22 PROCEDURE — 85652 RBC SED RATE AUTOMATED: CPT

## 2021-09-22 PROCEDURE — 87075 CULTR BACTERIA EXCEPT BLOOD: CPT

## 2021-09-22 PROCEDURE — 96365 THER/PROPH/DIAG IV INF INIT: CPT

## 2021-09-22 PROCEDURE — 87040 BLOOD CULTURE FOR BACTERIA: CPT

## 2021-09-22 PROCEDURE — 73630 X-RAY EXAM OF FOOT: CPT

## 2021-09-22 PROCEDURE — 36415 COLL VENOUS BLD VENIPUNCTURE: CPT

## 2021-09-22 PROCEDURE — 87070 CULTURE OTHR SPECIMN AEROBIC: CPT

## 2021-09-22 PROCEDURE — 86769 SARS-COV-2 COVID-19 ANTIBODY: CPT

## 2021-09-22 PROCEDURE — 86140 C-REACTIVE PROTEIN: CPT

## 2021-09-22 PROCEDURE — 83036 HEMOGLOBIN GLYCOSYLATED A1C: CPT

## 2021-09-22 PROCEDURE — 87186 SC STD MICRODIL/AGAR DIL: CPT

## 2021-09-22 PROCEDURE — 84145 PROCALCITONIN (PCT): CPT

## 2021-09-22 PROCEDURE — 73701 CT LOWER EXTREMITY W/DYE: CPT

## 2021-09-22 PROCEDURE — 99239 HOSP IP/OBS DSCHRG MGMT >30: CPT

## 2021-09-22 PROCEDURE — 82962 GLUCOSE BLOOD TEST: CPT

## 2021-09-22 PROCEDURE — 93005 ELECTROCARDIOGRAM TRACING: CPT

## 2021-09-22 RX ORDER — LOSARTAN POTASSIUM 100 MG/1
1 TABLET, FILM COATED ORAL
Qty: 0 | Refills: 0 | DISCHARGE

## 2021-09-22 RX ORDER — CEFPODOXIME PROXETIL 100 MG
1 TABLET ORAL
Qty: 10 | Refills: 0
Start: 2021-09-22 | End: 2021-09-26

## 2021-09-22 RX ORDER — INSULIN LISPRO 100/ML
3 VIAL (ML) SUBCUTANEOUS
Qty: 0 | Refills: 0 | DISCHARGE

## 2021-09-22 RX ORDER — CEFTRIAXONE 500 MG/1
1000 INJECTION, POWDER, FOR SOLUTION INTRAMUSCULAR; INTRAVENOUS ONCE
Refills: 0 | Status: COMPLETED | OUTPATIENT
Start: 2021-09-22 | End: 2021-09-22

## 2021-09-22 RX ORDER — CEFPODOXIME PROXETIL 100 MG
200 TABLET ORAL EVERY 12 HOURS
Refills: 0 | Status: DISCONTINUED | OUTPATIENT
Start: 2021-09-23 | End: 2021-09-22

## 2021-09-22 RX ADMIN — Medication 25 MILLIGRAM(S): at 13:06

## 2021-09-22 RX ADMIN — TACROLIMUS 1.5 MILLIGRAM(S): 5 CAPSULE ORAL at 09:29

## 2021-09-22 RX ADMIN — Medication 81 MILLIGRAM(S): at 11:59

## 2021-09-22 RX ADMIN — Medication 0.1 MILLIGRAM(S): at 05:20

## 2021-09-22 RX ADMIN — Medication 25 MILLIGRAM(S): at 05:20

## 2021-09-22 RX ADMIN — PIPERACILLIN AND TAZOBACTAM 25 GRAM(S): 4; .5 INJECTION, POWDER, LYOPHILIZED, FOR SOLUTION INTRAVENOUS at 01:10

## 2021-09-22 RX ADMIN — PIPERACILLIN AND TAZOBACTAM 25 GRAM(S): 4; .5 INJECTION, POWDER, LYOPHILIZED, FOR SOLUTION INTRAVENOUS at 09:35

## 2021-09-22 RX ADMIN — AZATHIOPRINE 100 MILLIGRAM(S): 100 TABLET ORAL at 11:59

## 2021-09-22 RX ADMIN — HEPARIN SODIUM 5000 UNIT(S): 5000 INJECTION INTRAVENOUS; SUBCUTANEOUS at 13:06

## 2021-09-22 RX ADMIN — GABAPENTIN 300 MILLIGRAM(S): 400 CAPSULE ORAL at 05:20

## 2021-09-22 RX ADMIN — FAMOTIDINE 20 MILLIGRAM(S): 10 INJECTION INTRAVENOUS at 11:59

## 2021-09-22 RX ADMIN — CEFTRIAXONE 100 MILLIGRAM(S): 500 INJECTION, POWDER, FOR SOLUTION INTRAMUSCULAR; INTRAVENOUS at 11:59

## 2021-09-22 RX ADMIN — INFLUENZA VIRUS VACCINE 0.5 MILLILITER(S): 15; 15; 15; 15 SUSPENSION INTRAMUSCULAR at 13:06

## 2021-09-22 RX ADMIN — HEPARIN SODIUM 5000 UNIT(S): 5000 INJECTION INTRAVENOUS; SUBCUTANEOUS at 05:21

## 2021-09-22 NOTE — PROGRESS NOTE ADULT - SUBJECTIVE AND OBJECTIVE BOX
55y year old Male seen at [facility] TELE 303 W1 for s/p Right Foot Abscess/ Infected Wound Sharp Excisional Debridement down to the subcutaneous layer with Dr. Araiza, DOS 9/21/2021. The patient states that he is feeling fine and not experiencing any soreness along the area.   Denies any fever, chills, nausea, vomiting, chest pain, shortness of breath, or calf pain at this time.    Allergies    No Known Allergies    Intolerances        MEDICATIONS  (STANDING):  aspirin enteric coated 81 milliGRAM(s) Oral daily  atorvastatin 10 milliGRAM(s) Oral at bedtime  azaTHIOprine 100 milliGRAM(s) Oral daily  cloNIDine 0.1 milliGRAM(s) Oral two times a day  dextrose 40% Gel 15 Gram(s) Oral once  dextrose 50% Injectable 25 Gram(s) IV Push once  famotidine    Tablet 20 milliGRAM(s) Oral daily  gabapentin 300 milliGRAM(s) Oral two times a day  glucagon  Injectable 1 milliGRAM(s) IntraMuscular once  heparin   Injectable 5000 Unit(s) SubCutaneous every 8 hours  hydrALAZINE 25 milliGRAM(s) Oral three times a day  insulin lispro (HumaLOG) Pump 1 Each SubCutaneous Continuous Pump  metoprolol tartrate 75 milliGRAM(s) Oral two times a day  sodium chloride 0.9%. 1000 milliLiter(s) (80 mL/Hr) IV Continuous <Continuous>  tacrolimus 1.5 milliGRAM(s) Oral two times a day    MEDICATIONS  (PRN):  labetalol Injectable 10 milliGRAM(s) IV Push every 10 minutes PRN Systolic blood pressure > 180      Vital Signs Last 24 Hrs  T(C): 37 (22 Sep 2021 11:18), Max: 37 (22 Sep 2021 11:18)  T(F): 98.6 (22 Sep 2021 11:18), Max: 98.6 (22 Sep 2021 11:18)  HR: 70 (22 Sep 2021 13:00) (55 - 81)  BP: 145/70 (22 Sep 2021 13:00) (129/69 - 177/88)  BP(mean): --  RR: 15 (22 Sep 2021 11:18) (15 - 21)  SpO2: 94% (22 Sep 2021 11:18) (93% - 98%)    PHYSICAL EXAM:  Vascular: DP faintly palpable on the right, DP non palpable on the left  & PT nonpalpable bilaterally, Capillary refill 3 seconds, no digital hair present bilaterally, skin temperature on the right foot slightly warmer compared to the contralateral   Neurological: Loss of protective sensation b/l   Musculoskeletal: 5/5 strength in all quadrants bilaterally, AJ & STJ ROM intact, s/p partial amputation of the right 2nd and 3rd digit unk/unk, s/p partial left 4th ray amputation, s/p partial amputation of the left 2nd digit, no pain upon palpation of the lateral and plantar aspect of the right foot, negative lillie's sign   Dermatological:   1. Grade 2 wound lateral aspect of the right 5th metatarsal base and extending plantarly- size 8.0cmx6.0cmx0.1- healthy red granular base, serous sanguineous drainage, does not probe to bone, no tunneling, no undermining, no malodor or signs of infection     CBC Full  -  ( 22 Sep 2021 07:37 )  WBC Count : 4.78 K/uL  RBC Count : 4.07 M/uL  Hemoglobin : 11.9 g/dL  Hematocrit : 37.0 %  Platelet Count - Automated : 270 K/uL  Mean Cell Volume : 90.9 fl  Mean Cell Hemoglobin : 29.2 pg  Mean Cell Hemoglobin Concentration : 32.2 gm/dL  Auto Neutrophil # : 3.15 K/uL  Auto Lymphocyte # : 0.97 K/uL  Auto Monocyte # : 0.36 K/uL  Auto Eosinophil # : 0.24 K/uL  Auto Basophil # : 0.05 K/uL  Auto Neutrophil % : 66.0 %  Auto Lymphocyte % : 20.3 %  Auto Monocyte % : 7.5 %  Auto Eosinophil % : 5.0 %  Auto Basophil % : 1.0 %      ----------CHEM PANEL----------          Culture - Tissue with Gram Stain (collected 22 Sep 2021 01:59)  Source: .Tissue Other, right foot deep tissue  Gram Stain (22 Sep 2021 06:58):    Rare polymorphonuclear leukocytes seen per low power field    Rare Gram positive cocci in pairs seen per oil power field    Culture - Blood (collected 20 Sep 2021 05:03)  Source: .Blood Blood-Peripheral  Preliminary Report (21 Sep 2021 06:01):    No growth to date.    Culture - Blood (collected 20 Sep 2021 05:03)  Source: .Blood Blood-Peripheral  Preliminary Report (21 Sep 2021 06:01):    No growth to date.        Imaging: ----------

## 2021-09-22 NOTE — PHYSICAL THERAPY INITIAL EVALUATION ADULT - ADDITIONAL COMMENTS
Patient reports being Independ with all ADLs and ambulation., even long distances. Has a walker and cane at home from previous CVA. Pt was informed to use this walker at home upon discharge with help from his family as needed. Pt reports having 3 steps to enter the house and 5 steps to get to his bedroom and bathroom upstairs, he is able to do stairs sitting if needed. shower is a tub but has railings to help get in.

## 2021-09-22 NOTE — PROGRESS NOTE ADULT - SUBJECTIVE AND OBJECTIVE BOX
Patient is a 55y Male whom presented to the hospital with s/p kidney transplant     PAST MEDICAL & SURGICAL HISTORY:  Hypertension    Hyperlipidemia    Diabetes mellitus  Type 1 on insulin pump    CKD (chronic kidney disease)  Renal Transplant 2013 at [facility]    Diabetic peripheral neuropathy    Obesity (BMI 30-39.9)    CVA (cerebral vascular accident)  Wallenberg Stroke  low L body sensation  low R face sensation  decreased peripheral vision  poor balance    Squamous cell carcinoma of skin of left ear    History of DVT (deep vein thrombosis)    Recurrent squamous cell carcinoma of skin  nose, L arm    Toe infection  2007 L 4th Toe surgery-amputation secondary to osteomyelitis    Ear disease  Left inner ear surgery, 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, Can NOT have MRI    Vasectomy status  s/p b/l  vasectomy    H/O foot surgery  2005 - right foot - bone fracture - s/p ORIF and subsequent removal of hardware    End-stage renal failure with renal transplant  12/2013    S/P kidney transplant    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        MEDICATIONS  (STANDING):  aspirin enteric coated 81 milliGRAM(s) Oral daily  atorvastatin 10 milliGRAM(s) Oral at bedtime  azaTHIOprine 100 milliGRAM(s) Oral daily  cloNIDine 0.1 milliGRAM(s) Oral two times a day  dextrose 40% Gel 15 Gram(s) Oral once  dextrose 50% Injectable 25 Gram(s) IV Push once  famotidine    Tablet 20 milliGRAM(s) Oral daily  gabapentin 300 milliGRAM(s) Oral two times a day  glucagon  Injectable 1 milliGRAM(s) IntraMuscular once  heparin   Injectable 5000 Unit(s) SubCutaneous every 8 hours  hydrALAZINE 25 milliGRAM(s) Oral three times a day  influenza   Vaccine 0.5 milliLiter(s) IntraMuscular once  insulin lispro (HumaLOG) Pump 1 Each SubCutaneous Continuous Pump  lactated ringers. 1000 milliLiter(s) (75 mL/Hr) IV Continuous <Continuous>  metoprolol tartrate 75 milliGRAM(s) Oral two times a day  piperacillin/tazobactam IVPB.. 3.375 Gram(s) IV Intermittent every 8 hours  sodium chloride 0.9%. 1000 milliLiter(s) (80 mL/Hr) IV Continuous <Continuous>  tacrolimus 1.5 milliGRAM(s) Oral two times a day      Allergies    No Known Allergies    Intolerances        SOCIAL HISTORY:  Denies ETOh,Smoking,     FAMILY HISTORY:  Family history of diabetes mellitus (DM)    FH: skin cancer        REVIEW OF SYSTEMS:    CONSTITUTIONAL: No weakness, fevers or chills  RESPIRATORY: No cough, wheezing, hemoptysis; No shortness of breath  CARDIOVASCULAR: No chest pain or palpitations  GASTROINTESTINAL: No abdominal or epigastric pain. No nausea, vomiting,     No diarrhea or constipation. No melena   GENITOURINARY: No dysuria, frequency or hematuria  NEUROLOGICAL: No numbness or weakness  SKIN: redness/swelling R leg.                            11.9   4.78  )-----------( 270      ( 22 Sep 2021 07:37 )             37.0       CBC Full  -  ( 22 Sep 2021 07:37 )  WBC Count : 4.78 K/uL  RBC Count : 4.07 M/uL  Hemoglobin : 11.9 g/dL  Hematocrit : 37.0 %  Platelet Count - Automated : 270 K/uL  Mean Cell Volume : 90.9 fl  Mean Cell Hemoglobin : 29.2 pg  Mean Cell Hemoglobin Concentration : 32.2 gm/dL  Auto Neutrophil # : 3.15 K/uL  Auto Lymphocyte # : 0.97 K/uL  Auto Monocyte # : 0.36 K/uL  Auto Eosinophil # : 0.24 K/uL  Auto Basophil # : 0.05 K/uL  Auto Neutrophil % : 66.0 %  Auto Lymphocyte % : 20.3 %  Auto Monocyte % : 7.5 %  Auto Eosinophil % : 5.0 %  Auto Basophil % : 1.0 %      09-22    143  |  106  |  13  ----------------------------<  48<LL>  4.5   |  35<H>  |  1.00    Ca    9.1      22 Sep 2021 07:37        CAPILLARY BLOOD GLUCOSE      POCT Blood Glucose.: 247 mg/dL (22 Sep 2021 11:54)  POCT Blood Glucose.: 114 mg/dL (22 Sep 2021 08:44)  POCT Blood Glucose.: 84 mg/dL (22 Sep 2021 08:26)  POCT Blood Glucose.: 57 mg/dL (22 Sep 2021 08:07)  POCT Blood Glucose.: 53 mg/dL (22 Sep 2021 08:06)  POCT Blood Glucose.: 62 mg/dL (22 Sep 2021 07:55)  POCT Blood Glucose.: 161 mg/dL (21 Sep 2021 21:43)      Vital Signs Last 24 Hrs  T(C): 37 (22 Sep 2021 11:18), Max: 37 (22 Sep 2021 11:18)  T(F): 98.6 (22 Sep 2021 11:18), Max: 98.6 (22 Sep 2021 11:18)  HR: 70 (22 Sep 2021 13:00) (55 - 71)  BP: 145/70 (22 Sep 2021 13:00) (133/71 - 145/70)  BP(mean): --  RR: 15 (22 Sep 2021 11:18) (15 - 18)  SpO2: 94% (22 Sep 2021 11:18) (94% - 98%)            PHYSICAL EXAM:    Constitutional: NAD  HEENT: conjunctive   clear   Neck:  No JVD  Respiratory: CTAB  Cardiovascular: S1 and S2  Gastrointestinal: BS+, soft, NT/ND  Extremities: Erythema and edema of RLE

## 2021-09-22 NOTE — PROGRESS NOTE ADULT - ASSESSMENT
s/p Right Foot Abscess/ Infected Wound Sharp Excisional Debridement down to the subcutaneous layer with Dr. Araiza, KRISHAN 9/21/2021- revealing Healthy Grade 2 wound    Area dressed with Aquacel Ag    Follow up with the OR pathologies and cultures    Follow up with the infectious disease recommendations    Wound Care Instructions below    Patient to be partial weight bearing to the right heel in surgical shoe    Podiatry team will continue to follow patient

## 2021-09-22 NOTE — PROGRESS NOTE ADULT - PROBLEM SELECTOR PLAN 1
cont current insulin pump settings  cont cons cho diet  goal bg 100-180 in hosp setting
cont current bolus settings;  change temp basal setting 80% (am hypoglycemia)  goal bg 100-180 in hosp setting  cont cons cho diet
s/p Right Foot Abscess/ Infected Wound Sharp Excisional Debridement down to the subcutaneous layer with Dr. Araiza, DOS 9/21/2021- revealing Healthy Grade 2 wound  - patient seen and evaluated  - wound dressing taken off with care and to patient's tolerance  - wound gently cleansed with normal saline and area pat dry with sterile gauze  - Wound dressed with Aquacel ag and DSD  - Follow up on the OR pathologies and cultures  - Per Dr. Araiza, patient is podiatry stable for discharge  - Partial weight bearing to the right heel in surgical shoe  - Follow up with infectious disease recommendations  - Wound Care Instructions below  - Podiatry team will continue to follow patient while in house  WOUND CARE INSTRUCTIONS   1. Cleanse wound with sterile saline solution and gently pat dry.  2. Dress wound with Aquacel ag and dry, sterile dressing.  3. Change dressings daily or every other day (patient's preference or secondary to the amount of drainage) and monitor for any signs of infection.  4. Patient is to follow up [facility] with Dr. Araiza or Dr. Catherine within 1 week upon discharge.

## 2021-09-22 NOTE — CONSULT NOTE ADULT - CONSULT REASON
Right Lateral Foot Grade 3 wound
Kidney transplant recipient
Preoperative assessment
ckd and raji
55y A1C with Estimated Average Glucose Result: 7.8 % (09-20-21 @ 08:52)   diabetes mellitus uncontrolled type 2
cellulitis
dm1 uncontrolled
55y A1C with Estimated Average Glucose Result: 7.8 % (09-20-21 @ 08:52)   diabetes mellitus type 1 on insulin pump

## 2021-09-22 NOTE — PHYSICAL THERAPY INITIAL EVALUATION ADULT - RANGE OF MOTION EXAMINATION, REHAB EVAL
R foot restricted from ace bandage/Right UE ROM was WNL (within normal limits)/Left UE ROM was WFL (within functional limits)/Left LE ROM was WFL (within functional limits)/deficits as listed below

## 2021-09-22 NOTE — PROGRESS NOTE ADULT - ASSESSMENT
55m with h/o renal transplant immune suppressed on bactrim ppx, dm, htn, hld, aida, rle osteomyelitis of his 2 and 3 digits requiring amputation  here with rle swelling redness/pain following trauma  appears to be nonpurulent cellulitis     RECOMMENDATIONS  no dvt   ct without deep infection/no om and timing of 4 days so doubt osteomyelitis  no fever,   sed rate 44-   blood cx-NGTD  neg mrsa screen- not purulent so low concern  keep elevated    Last dose of IV abx today -ordered Ceftriaxone IV x 1 now and then fine to dc on   Vantin 200mg PO BID with last day 9/26    bactrim ppx resume on dc    From an ID standpoint no further requirement for inpatient status for the management of ID issues after last dose of IV abx. Fine with discharge from ID standpoint when other medical issues no longer require inpatient care and social issues allow for a safe discharge plan. To schedule an outpatient ID follow up appointment please call our office at [phone]  Thank you for consulting us and involving us in the management of this most interesting and challenging case.  Please call us at [phone] or text me directly on my cell#[phone] with any concerns or further questions.

## 2021-09-22 NOTE — CONSULT NOTE ADULT - PROBLEM SELECTOR PROBLEM 1
Diabetes mellitus type 1, uncontrolled
Diabetes mellitus type 1, uncontrolled
Diabetes mellitus type 1, with complication, on long term insulin pump
Diabetic foot ulcer
Renal transplant recipient

## 2021-09-22 NOTE — DISCHARGE NOTE PROVIDER - NSDCCPCAREPLAN_GEN_ALL_CORE_FT
PRINCIPAL DISCHARGE DIAGNOSIS  Diagnosis: Cellulitis of leg, right  Assessment and Plan of Treatment: You were noted to have a wound on your foot. For this you were seen an evaluated by podiatry and infectious disease. You had an I&D of the right foot wound by podiatry and you were started on IV antibiotics inpatient      SECONDARY DISCHARGE DIAGNOSES  Diagnosis: Osteomyelitis of ankle or foot, acute  Assessment and Plan of Treatment:      PRINCIPAL DISCHARGE DIAGNOSIS  Diagnosis: Cellulitis of leg, right  Assessment and Plan of Treatment: You were noted to have a wound on your foot. For this you were seen an evaluated by podiatry and infectious disease. You had an I&D with pulse lavages of the right foot wound by podiatry and you were started on IV antibiotics inpatient. You were sent home with Vantin 200mg twice a day. Prescription is sent to your pharamacy. Continue to take your antibiotics as prescribed. Follow up with wound care within 1 week of discharge. Follow up with your primary care physician within 1 week of discharge.  WOUND CARE:  - Follow up with wound care pout patient.   - keep the leg elevated.   - toe touch weight-bearing; minimize pressure on wound  - Ambultes to keep circulation going  - Avoid trauma to the area.         PRINCIPAL DISCHARGE DIAGNOSIS  Diagnosis: Cellulitis of leg, right  Assessment and Plan of Treatment: You were noted to have a wound on your foot. For this you were seen an evaluated by podiatry and infectious disease. You had an I&D with pulse lavages of the right foot wound by podiatry and you were started on IV antibiotics inpatient. You were sent home with Vantin 200mg twice a day. Prescription is sent to your pharamacy. Continue to take your antibiotics as prescribed. Follow up with wound care within 1 week of discharge. Follow up with your primary care physician within 1 week of discharge.  WOUND CARE:  - Follow up with wound care out patient.   - keep the leg elevated.   - toe touch weight-bearing; minimize pressure on wound  - Ambultes to keep circulation going  - Avoid trauma to the area.

## 2021-09-22 NOTE — PROGRESS NOTE ADULT - PROVIDER SPECIALTY LIST ADULT
Hospitalist
Nephrology
Cardiology
Hospitalist
Nephrology
Cardiology
Infectious Disease
Infectious Disease
Endocrinology
Nephrology
Endocrinology
Podiatry

## 2021-09-22 NOTE — CONSULT NOTE ADULT - CONSULT REQUESTED DATE/TIME
20-Sep-2021 09:05
20-Sep-2021 18:39
22-Sep-2021 10:57
20-Sep-2021 13:51
20-Sep-2021 16:32
20-Sep-2021 09:48
20-Sep-2021 13:53
20-Sep-2021 07:55

## 2021-09-22 NOTE — DISCHARGE NOTE PROVIDER - HOSPITAL COURSE
FROM ADMISSION H+P:   HPI:  Patient is a 55 y old M with PMHx of CKD s/p renal transplant, CVA, DM type 1 (on insulin pump), neuropathy, DVT, HLD, Hypertension, Obesity, CARMEN (On nocturnal NIPPV), Restrictive Lung Disease, squamous cell carcinoma presents to ED for redness/swelling R leg. 3 days ago patient cut his foot, initially there was discharge, however that stopped within the first day. Patient states that he began to see swelling, redness, and felt 5/10 throbbing non-radiating pain in RLE. He has had a history of osteomyelitis before on the same foot, was treated with IV ceftriaxone. Denies chest pain, palpitations, dyspnea, headache, dizziness, abdominal pain, n/v/d. He is a renal transplant patient on anti-rejection medication - takes tacrolimus at 930AM and 930PM. Follows with nephrology Dr. Weber and states his baseline creatinine is 1.2.    In the ED   Vitals 97.2 F | HR 79 | /96-->117/64 | RR 18 | 98% RA   Labs: h/h 12.1/36.8, BUN 29, Cr 1.5, Alb 3.2, AST 40, GFR 52, Lactate 0.3  Duplex U/S- No evidence of right lower extremity deep venous thrombosis. Subcutaneous edema is visualized throughout the right calf.  CXR- no acute infiltrates on personal read  EKG: Sinus rhythm with 1st degree AV block 64 bpm, QT/QTc 406/418 (20 Sep 2021 02:17)      ---  HOSPITAL COURSE: Pt with wound of rt foot. He was seen and evaluated by podiatry and infectious disease for this. He has an I&D and cultures showed __. Pt was started on zosyn for cellulitis.    ---  CONSULTANTS:  Podiatry: Dr. Araiza  Nephro: Dr. Hilton   ID: Dr. Saucedo   Cardio: [facility]   Endocrine: Dr. Perlman        ---  TIME SPENT:  I, the attending physician, was physically present for the key portions of the evaluation and management (E/M) service provided. The total amount of time spent reviewing the hospital notes, laboratory values, imaging findings, assessing/counseling the patient, discussing with consultant physicians, social work, nursing staff was -- minutes    ---  Primary care provider was made aware of plan for discharge:      [  ] NO     [  ] YES   FROM ADMISSION H+P:   HPI:  Patient is a 55 y old M with PMHx of CKD s/p renal transplant, CVA, DM type 1 (on insulin pump), neuropathy, DVT, HLD, Hypertension, Obesity, CARMEN (On nocturnal NIPPV), Restrictive Lung Disease, squamous cell carcinoma presents to ED for redness/swelling R leg. 3 days ago patient cut his foot, initially there was discharge, however that stopped within the first day. Patient states that he began to see swelling, redness, and felt 5/10 throbbing non-radiating pain in RLE. He has had a history of osteomyelitis before on the same foot, was treated with IV ceftriaxone. Denies chest pain, palpitations, dyspnea, headache, dizziness, abdominal pain, n/v/d. He is a renal transplant patient on anti-rejection medication - takes tacrolimus at 930AM and 930PM. Follows with nephrology Dr. Weber and states his baseline creatinine is 1.2.    In the ED   Vitals 97.2 F | HR 79 | /96-->117/64 | RR 18 | 98% RA   Labs: h/h 12.1/36.8, BUN 29, Cr 1.5, Alb 3.2, AST 40, GFR 52, Lactate 0.3  Duplex U/S- No evidence of right lower extremity deep venous thrombosis. Subcutaneous edema is visualized throughout the right calf.  CXR- no acute infiltrates on personal read  EKG: Sinus rhythm with 1st degree AV block 64 bpm, QT/QTc 406/418 (20 Sep 2021 02:17)      ---  HOSPITAL COURSE: Pt with wound of rt foot. He was seen and evaluated by podiatry and infectious disease for this. Duplex u/s no evidence of DVT. R foot x-ray shows no active disease. Blood cx: NGTD. MRSA PCR: negative. CT scan of R-foot : negative for Osteomyelitis. He has an I&D and cultures with pulse lavages was done by Dr. Araiza. Pt was started on zosyn for cellulitis. Patient improved over the course of hospitalization. Post o[ tissue culture pending. Patient sent home on Vantin 200mg PO BID, last day 9/26/21. Patient will follow with wound care outpatient.   Patient is stable for discharge as per primary care team  and consultant.    Patient showed improvement throughout hospitalization. Patient seen and examined on the day of discharge. Patient is medically optimized for discharge with close outpatient follow up.   ---  Vital Signs Last 24 Hrs  T(C): 36.4 (22 Sep 2021 04:20), Max: 36.9 (21 Sep 2021 19:46)  T(F): 97.6 (22 Sep 2021 04:20), Max: 98.4 (21 Sep 2021 19:46)  HR: 55 (22 Sep 2021 04:20) (55 - 81)  BP: 134/70 (22 Sep 2021 04:20) (115/69 - 177/88)  BP(mean): --  RR: 18 (22 Sep 2021 04:20) (14 - 21)  SpO2: 98% (22 Sep 2021 04:20) (92% - 99%)    Physical Exam :  GENERAL: NAD, well-groomed, well-developed.  HEAD:  Atraumatic, Normocephalic.  NERVOUS SYSTEM:  Alert & Oriented X3, Good concentration.  CHEST/LUNG: Clear to auscultation bilaterally; No rales, rhonchi, wheezing, or rubs.  HEART: Regular rate and rhythm; No murmurs, rubs, or gallops.  ABDOMEN: Soft, Nontender, Nondistended; Bowel sounds present.  EXTREMITIES: R foot wrapped with ace bandage, covered with compression bandage. 1+ Peripheral Pulses. No clubbing, cyanosis. Calves non-TTP bilaterally. ROM L is normal. R foot restricted from ace bandage. Strength of right ankle will increase to 5/5 within a week.  SKIN: No abrasions, lacerations, bruising, or deformities.     CONSULTANTS:  Podiatry: Dr. Araiza  Nephro: Dr. Hilton   ID: Dr. Saucedo   Cardio: [facility]   Endocrine: Dr. Perlman        ---  TIME SPENT:  I, the attending physician, was physically present for the key portions of the evaluation and management (E/M) service provided. The total amount of time spent reviewing the hospital notes, laboratory values, imaging findings, assessing/counseling the patient, discussing with consultant physicians, social work, nursing staff was -- minutes    ---  Primary care provider was made aware of plan for discharge:      [  ] NO     [  ] YES   FROM ADMISSION H+P:   HPI:  Patient is a 55 y old M with PMHx of CKD s/p renal transplant, CVA, DM type 1 (on insulin pump), neuropathy, DVT, HLD, Hypertension, Obesity, CARMEN (On nocturnal NIPPV), Restrictive Lung Disease, squamous cell carcinoma presents to ED for redness/swelling R leg. 3 days ago patient cut his foot, initially there was discharge, however that stopped within the first day. Patient states that he began to see swelling, redness, and felt 5/10 throbbing non-radiating pain in RLE. He has had a history of osteomyelitis before on the same foot, was treated with IV ceftriaxone. Denies chest pain, palpitations, dyspnea, headache, dizziness, abdominal pain, n/v/d. He is a renal transplant patient on anti-rejection medication - takes tacrolimus at 930AM and 930PM. Follows with nephrology Dr. Weber and states his baseline creatinine is 1.2.    In the ED   Vitals 97.2 F | HR 79 | /96-->117/64 | RR 18 | 98% RA   Labs: h/h 12.1/36.8, BUN 29, Cr 1.5, Alb 3.2, AST 40, GFR 52, Lactate 0.3  Duplex U/S- No evidence of right lower extremity deep venous thrombosis. Subcutaneous edema is visualized throughout the right calf.  CXR- no acute infiltrates on personal read  EKG: Sinus rhythm with 1st degree AV block 64 bpm, QT/QTc 406/418 (20 Sep 2021 02:17)      ---  HOSPITAL COURSE: Pt with wound of rt foot. He was seen and evaluated by podiatry and infectious disease for this. Duplex u/s no evidence of DVT. R foot x-ray shows no active disease. Blood cx: NGTD. MRSA PCR: negative. CT scan of R-foot : negative for Osteomyelitis. He has an I&D and cultures with pulse lavages was done by Dr. Araiza. Pt was started on zosyn for cellulitis. Patient improved over the course of hospitalization. Post o[ tissue culture pending. Patient sent home on Vantin 200mg PO BID, last day 9/26/21. Patient will follow with wound care outpatient.   Patient is stable for discharge as per primary care team  and consultant.    Patient showed improvement throughout hospitalization. Patient seen and examined on the day of discharge. Patient is medically optimized for discharge with close outpatient follow up.   ---  Vital Signs Last 24 Hrs  T(C): 36.4 (22 Sep 2021 04:20), Max: 36.9 (21 Sep 2021 19:46)  T(F): 97.6 (22 Sep 2021 04:20), Max: 98.4 (21 Sep 2021 19:46)  HR: 55 (22 Sep 2021 04:20) (55 - 81)  BP: 134/70 (22 Sep 2021 04:20) (115/69 - 177/88)  BP(mean): --  RR: 18 (22 Sep 2021 04:20) (14 - 21)  SpO2: 98% (22 Sep 2021 04:20) (92% - 99%)    Physical Exam :  GENERAL: NAD, well-groomed, well-developed.  HEAD:  Atraumatic, Normocephalic.  NERVOUS SYSTEM:  Alert & Oriented X3, Good concentration.  CHEST/LUNG: Clear to auscultation bilaterally; No rales, rhonchi, wheezing, or rubs.  HEART: Regular rate and rhythm; No murmurs, rubs, or gallops.  ABDOMEN: Soft, Nontender, Nondistended; Bowel sounds present.  EXTREMITIES: R foot wrapped with ace bandage, covered with compression bandage. 1+ Peripheral Pulses. No clubbing, cyanosis. Calves non-TTP bilaterally. ROM L is normal. R foot restricted from ace bandage. Strength of right ankle will increase to 5/5 within a week.  SKIN: No abrasions, lacerations, bruising, or deformities.     CONSULTANTS:  Podiatry: Dr. Araiza  Nephro: Dr. Hilton   ID: Dr. Saucedo   Cardio: [facility]   Endocrine: Dr. Perlman      45 miunte   ---  TIME SPENT:  I, the attending physician, was physically present for the key portions of the evaluation and management (E/M) service provided. The total amount of time spent reviewing the hospital notes, laboratory values, imaging findings, assessing/counseling the patient, discussing with consultant physicians, social work, nursing staff was -- minutes    ---  Primary care provider was made aware of plan for discharge:      [  ] NO     [  ] YES

## 2021-09-22 NOTE — DISCHARGE NOTE NURSING/CASE MANAGEMENT/SOCIAL WORK - PATIENT PORTAL LINK FT
You can access the FollowMyHealth Patient Portal offered by [facility] by registering at the following website: [email or web address]. By joining [facility]’s FollowMyHealth portal, you will also be able to view your health information using other applications (apps) compatible with our system.

## 2021-09-22 NOTE — PROGRESS NOTE ADULT - SUBJECTIVE AND OBJECTIVE BOX
[facility] -- Julisa Jansen, Luly Larson, Karan Kelly Savella  Office # [phone]      Follow Up:    pre operative clearance     Subjective/Observations:     No events overnight resting comfortably in bed.  No complaints of chest pain, dyspnea, or palpitations reported. No signs of orthopnea or PND.    REVIEW OF SYSTEMS: All other review of systems is negative unless indicated above    PAST MEDICAL & SURGICAL HISTORY:  Hypertension    Hyperlipidemia    Diabetes mellitus  Type 1 on insulin pump    CKD (chronic kidney disease)  Renal Transplant 2013 at [facility]    Diabetic peripheral neuropathy    Obesity (BMI 30-39.9)    CVA (cerebral vascular accident)  Wallenberg Stroke  low L body sensation  low R face sensation  decreased peripheral vision  poor balance    Squamous cell carcinoma of skin of left ear    History of DVT (deep vein thrombosis)    Recurrent squamous cell carcinoma of skin  nose, L arm    Toe infection  2007 L 4th Toe surgery-amputation secondary to osteomyelitis    Ear disease  Left inner ear surgery, 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, Can NOT have MRI    Vasectomy status  s/p b/l  vasectomy    H/O foot surgery  2005 - right foot - bone fracture - s/p ORIF and subsequent removal of hardware    End-stage renal failure with renal transplant  12/2013    S/P kidney transplant    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        MEDICATIONS  (STANDING):  aspirin enteric coated 81 milliGRAM(s) Oral daily  atorvastatin 10 milliGRAM(s) Oral at bedtime  azaTHIOprine 100 milliGRAM(s) Oral daily  cloNIDine 0.1 milliGRAM(s) Oral two times a day  dextrose 40% Gel 15 Gram(s) Oral once  dextrose 50% Injectable 25 Gram(s) IV Push once  famotidine    Tablet 20 milliGRAM(s) Oral daily  gabapentin 300 milliGRAM(s) Oral two times a day  glucagon  Injectable 1 milliGRAM(s) IntraMuscular once  heparin   Injectable 5000 Unit(s) SubCutaneous every 8 hours  hydrALAZINE 25 milliGRAM(s) Oral three times a day  influenza   Vaccine 0.5 milliLiter(s) IntraMuscular once  insulin lispro (HumaLOG) Pump 1 Each SubCutaneous Continuous Pump  metoprolol tartrate 75 milliGRAM(s) Oral two times a day  sodium chloride 0.9%. 1000 milliLiter(s) (80 mL/Hr) IV Continuous <Continuous>  tacrolimus 1.5 milliGRAM(s) Oral two times a day    MEDICATIONS  (PRN):  labetalol Injectable 10 milliGRAM(s) IV Push every 10 minutes PRN Systolic blood pressure > 180      Allergies    No Known Allergies    Intolerances        Vital Signs Last 24 Hrs  T(C): 37 (22 Sep 2021 11:18), Max: 37 (22 Sep 2021 11:18)  T(F): 98.6 (22 Sep 2021 11:18), Max: 98.6 (22 Sep 2021 11:18)  HR: 71 (22 Sep 2021 11:18) (55 - 81)  BP: 133/71 (22 Sep 2021 11:18) (115/69 - 177/88)  BP(mean): --  RR: 15 (22 Sep 2021 11:18) (14 - 21)  SpO2: 94% (22 Sep 2021 11:18) (93% - 99%)    I&O's Summary    21 Sep 2021 07:01  -  22 Sep 2021 07:00  --------------------------------------------------------  IN: 640 mL / OUT: 800 mL / NET: -160 mL    22 Sep 2021 07:01  -  22 Sep 2021 12:30  --------------------------------------------------------  IN: 600 mL / OUT: 0 mL / NET: 600 mL      Weight (kg): 136.1 (09-21 @ 13:15)    PHYSICAL EXAM:  TELE: not on tele   Constitutional: NAD, awake and alert, well-developed  HEENT: Moist Mucous Membranes, Anicteric  Pulmonary: Non-labored, breath sounds are clear bilaterally, No wheezing, crackles or rhonchi  Cardiovascular: Regular, S1 and S2 nl, No murmurs, rubs, gallops or clicks  Gastrointestinal: Bowel Sounds present, soft, nontender.   Lymph: No lymphadenopathy. No peripheral edema.  Skin: right foot dressing   Psych:  Mood & affect appropriate    LABS: All Labs Reviewed:                        11.9   4.78  )-----------( 270      ( 22 Sep 2021 07:37 )             37.0                         11.4   5.20  )-----------( 238      ( 21 Sep 2021 07:24 )             35.2                         11.3   7.53  )-----------( 247      ( 20 Sep 2021 07:18 )             34.6     22 Sep 2021 07:37    143    |  106    |  13     ----------------------------<  48     4.5     |  35     |  1.00   21 Sep 2021 07:24    142    |  108    |  17     ----------------------------<  125    4.4     |  31     |  1.20   20 Sep 2021 07:18    141    |  105    |  23     ----------------------------<  61     3.7     |  29     |  1.10     Ca    9.1        22 Sep 2021 07:37  Ca    8.4        21 Sep 2021 07:24  Ca    8.2        20 Sep 2021 07:18    TPro  6.8    /  Alb  2.7    /  TBili  0.5    /  DBili  x      /  AST  20     /  ALT  20     /  AlkPhos  84     20 Sep 2021 07:18  TPro  7.7    /  Alb  3.2    /  TBili  0.6    /  DBili  x      /  AST  40     /  ALT  25     /  AlkPhos  94     19 Sep 2021 22:46

## 2021-09-22 NOTE — PROGRESS NOTE ADULT - SUBJECTIVE AND OBJECTIVE BOX
CAPILLARY BLOOD GLUCOSE      POCT Blood Glucose.: 247 mg/dL (22 Sep 2021 11:54)  POCT Blood Glucose.: 114 mg/dL (22 Sep 2021 08:44)  POCT Blood Glucose.: 84 mg/dL (22 Sep 2021 08:26)  POCT Blood Glucose.: 57 mg/dL (22 Sep 2021 08:07)  POCT Blood Glucose.: 53 mg/dL (22 Sep 2021 08:06)  POCT Blood Glucose.: 62 mg/dL (22 Sep 2021 07:55)  POCT Blood Glucose.: 161 mg/dL (21 Sep 2021 21:43)  POCT Blood Glucose.: 164 mg/dL (21 Sep 2021 15:32)      Vital Signs Last 24 Hrs  T(C): 37 (22 Sep 2021 11:18), Max: 37 (22 Sep 2021 11:18)  T(F): 98.6 (22 Sep 2021 11:18), Max: 98.6 (22 Sep 2021 11:18)  HR: 71 (22 Sep 2021 11:18) (55 - 81)  BP: 133/71 (22 Sep 2021 11:18) (129/69 - 177/88)  BP(mean): --  RR: 15 (22 Sep 2021 11:18) (14 - 21)  SpO2: 94% (22 Sep 2021 11:18) (93% - 99%)    General: WN/WD NAD  Respiratory: CTA B/L  CV: RRR, S1S2, no murmurs, rubs or gallops  Abdominal: Soft, NT, ND +BS, Last BM  Extremities: le erythema     09-22    143  |  106  |  13  ----------------------------<  48<LL>  4.5   |  35<H>  |  1.00    Ca    9.1      22 Sep 2021 07:37        atorvastatin 10 milliGRAM(s) Oral at bedtime  dextrose 40% Gel 15 Gram(s) Oral once  dextrose 50% Injectable 25 Gram(s) IV Push once  glucagon  Injectable 1 milliGRAM(s) IntraMuscular once  insulin lispro (HumaLOG) Pump 1 Each SubCutaneous Continuous Pump

## 2021-09-22 NOTE — CONSULT NOTE ADULT - PROBLEM SELECTOR RECOMMENDATION 9
Type 1  A1c 7.8 % s/p cellulitis  Recommend endocrine- Perlman on consult  FU appt: has appt with endo Dr Garcia  DSC recommendations: return to home regimen and glucose monitoring  diabetes education provided: advised patient to set a second basal rate at 8pm to 2.5u/hr (decreased from 3u/hr)  to try to prevent hypoglycemia in am. Patient will make adjustment.     Goal 100-180 mg/dL; 140-180 mg/dL in critical care areas

## 2021-09-22 NOTE — DISCHARGE NOTE PROVIDER - NSDCMRMEDTOKEN_GEN_ALL_CORE_FT
aspirin 81 mg oral tablet: 1 tab(s) orally once a day  azaTHIOprine 100 mg oral tablet: 1 tab(s) orally once a day  cloNIDine 0.1 mg oral tablet: 1 tab(s) orally 2 times a day  famotidine 20 mg oral tablet: 1 tab(s) orally once a day  gabapentin 300 mg oral capsule: 1 cap(s) orally 2 times a day  HumaLOG 100 units/mL injectable solution: 3 unit(s) injectable every hour via insulin pump. TDD: 72 units.  hydrALAZINE 25 mg oral tablet: 1 tab(s) orally 3 times a day  losartan 25 mg oral tablet: 1 tab(s) orally once a day  lovastatin 40 mg oral tablet: 1 tab(s) orally once a day  Metoprolol Tartrate 25 mg oral tablet: 3 tab(s) orally 2 times a day    *conf. with patient and pharmacy. 75mg dose, BID*  tacrolimus: 1.5 milligram(s) orally 2 times a day   aspirin 81 mg oral tablet: 1 tab(s) orally once a day  azaTHIOprine 100 mg oral tablet: 1 tab(s) orally once a day  cefpodoxime 200 mg oral tablet: 1 tab(s) orally every 12 hours  cloNIDine 0.1 mg oral tablet: 1 tab(s) orally 2 times a day  famotidine 20 mg oral tablet: 1 tab(s) orally once a day  gabapentin 300 mg oral capsule: 1 cap(s) orally 2 times a day  hydrALAZINE 25 mg oral tablet: 1 tab(s) orally 3 times a day  lovastatin 40 mg oral tablet: 1 tab(s) orally once a day  Metoprolol Tartrate 25 mg oral tablet: 3 tab(s) orally 2 times a day    *conf. with patient and pharmacy. 75mg dose, BID*  tacrolimus: 1.5 milligram(s) orally 2 times a day

## 2021-09-22 NOTE — PROVIDER CONTACT NOTE (CRITICAL VALUE NOTIFICATION) - ACTION/TREATMENT ORDERED:
MD Rose made aware. Followed hypoglycemia protocol. Pt having his breakfast. Denies any distress at present.

## 2021-09-22 NOTE — PROGRESS NOTE ADULT - ASSESSMENT
55 y old M with PMHx of CKD s/p renal transplant, CVA, s/p negative ILR monitor, never removed, DM type 1 (on insulin pump), neuropathy, DVT, HLD, Hypertension, Obesity, CARMEN (On nocturnal NIPPV), Restrictive Lung Disease, squamous cell carcinoma presents to ED for redness/swelling R leg, for I and D under local anesthesia:    Cellulitis R Leg    sp I & D  follow up podiatry, ID recs    HTN  -133/71  - Continue clonidine 0.1 bid  - Continue hydralazine 25 bid  - Continue metoprolol 75 bid    HLD  - continue statin     on discharge to follow up in our office   Jennifer Ross FNP-C  Cardiology NP  [id number] [phone]

## 2021-09-22 NOTE — PROGRESS NOTE ADULT - SUBJECTIVE AND OBJECTIVE BOX
[facility] DIVISION of INFECTIOUS DISEASE  Marko Saucedo MD PhD, Qiana Steven MD, Claire Castro MD, Tal Russo MD  and providing coverage with Sabrina Hinton MD and Krista Del Valle MD  Providing Infectious Disease Consultations at [facility], [facility], [facility]'s    Office# [phone] to schedule follow up appointments  Answering Service for urgent calls or New Consults [phone]  Cell# to text for urgent issues Marko Saucedo [phone]     infectious diseases progress note:    LUTHER NORIEGA is a 55y y. o. Male patient    No concerning overnight events    Allergies    No Known Allergies    Intolerances        ANTIBIOTICS/RELEVANT:  antimicrobials    immunologic:  azaTHIOprine 100 milliGRAM(s) Oral daily  influenza   Vaccine 0.5 milliLiter(s) IntraMuscular once  tacrolimus 1.5 milliGRAM(s) Oral two times a day    OTHER:  aspirin enteric coated 81 milliGRAM(s) Oral daily  atorvastatin 10 milliGRAM(s) Oral at bedtime  cloNIDine 0.1 milliGRAM(s) Oral two times a day  dextrose 40% Gel 15 Gram(s) Oral once  dextrose 50% Injectable 25 Gram(s) IV Push once  famotidine    Tablet 20 milliGRAM(s) Oral daily  gabapentin 300 milliGRAM(s) Oral two times a day  glucagon  Injectable 1 milliGRAM(s) IntraMuscular once  heparin   Injectable 5000 Unit(s) SubCutaneous every 8 hours  hydrALAZINE 25 milliGRAM(s) Oral three times a day  insulin lispro (HumaLOG) Pump 1 Each SubCutaneous Continuous Pump  labetalol Injectable 10 milliGRAM(s) IV Push every 10 minutes PRN  metoprolol tartrate 75 milliGRAM(s) Oral two times a day  sodium chloride 0.9%. 1000 milliLiter(s) IV Continuous <Continuous>      Objective:  Vital Signs Last 24 Hrs  T(C): 36.4 (22 Sep 2021 04:20), Max: 36.9 (21 Sep 2021 19:46)  T(F): 97.6 (22 Sep 2021 04:20), Max: 98.4 (21 Sep 2021 19:46)  HR: 55 (22 Sep 2021 04:20) (55 - 81)  BP: 134/70 (22 Sep 2021 04:20) (115/69 - 177/88)  BP(mean): --  RR: 18 (22 Sep 2021 04:20) (14 - 21)  SpO2: 98% (22 Sep 2021 04:20) (92% - 99%)    T(C): 36.4 (09-22-21 @ 04:20), Max: 36.9 (09-20-21 @ 17:08)  T(C): 36.4 (09-22-21 @ 04:20), Max: 36.9 (09-20-21 @ 01:22)  T(C): 36.4 (09-22-21 @ 04:20), Max: 36.9 (09-20-21 @ 01:22)    PHYSICAL EXAM:  HEENT: NC atraumatic  Neck: supple  Respiratory: no accessory muscle use, breathing comfortably  Cardiovascular: distant  Gastrointestinal: normal appearing, nondistended  Extremities: no clubbing, no cyanosis, LE with decreased swelling (noted wrinkling) decreased erythema, foot wrapped waiting for wound care        LABS:                          11.9   4.78  )-----------( 270      ( 22 Sep 2021 07:37 )             37.0       4.78 09-22 @ 07:37  5.20 09-21 @ 07:24  7.53 09-20 @ 07:18  9.39 09-19 @ 22:46      09-22    143  |  106  |  13  ----------------------------<  48<LL>  4.5   |  35<H>  |  1.00    Ca    9.1      22 Sep 2021 07:37        Creatinine, Serum: 1.00 mg/dL (09-22-21 @ 07:37)  Creatinine, Serum: 1.20 mg/dL (09-21-21 @ 07:24)  Creatinine, Serum: 1.10 mg/dL (09-20-21 @ 07:18)  Creatinine, Serum: 1.50 mg/dL (09-19-21 @ 22:46)                INFLAMMATORY MARKERS  Auto Neutrophil #: 3.15 K/uL (09-22-21 @ 07:37)  Auto Lymphocyte #: 0.97 K/uL (09-22-21 @ 07:37)  Auto Neutrophil #: 3.12 K/uL (09-21-21 @ 07:24)  Auto Lymphocyte #: 1.15 K/uL (09-21-21 @ 07:24)  Auto Neutrophil #: 5.20 K/uL (09-20-21 @ 07:18)  Auto Lymphocyte #: 1.18 K/uL (09-20-21 @ 07:18)  Auto Neutrophil #: 6.78 K/uL (09-19-21 @ 22:46)  Auto Lymphocyte #: 1.39 K/uL (09-19-21 @ 22:46)    Lactate, Blood: 0.3 mmol/L (09-19-21 @ 22:46)    Auto Eosinophil #: 0.24 K/uL (09-22-21 @ 07:37)  Auto Eosinophil #: 0.34 K/uL (09-21-21 @ 07:24)  Auto Eosinophil #: 0.27 K/uL (09-20-21 @ 07:18)  Auto Eosinophil #: 0.24 K/uL (09-19-21 @ 22:46)      Sedimentation Rate, Erythrocyte: 44 mm/hr (09-20-21 @ 07:18)    Procalcitonin, Serum: <0.05 (09-19-21 @ 22:46)        Activated Partial Thromboplastin Time: 31.8 sec (09-19-21 @ 22:46)  INR: 1.11 ratio (09-19-21 @ 22:46)          MICROBIOLOGY:        RADIOLOGY & ADDITIONAL STUDIES:

## 2021-09-22 NOTE — PROGRESS NOTE ADULT - REASON FOR ADMISSION
cellulitis

## 2021-09-22 NOTE — PROGRESS NOTE ADULT - NUTRITIONAL ASSESSMENT
MEDICATIONS  (STANDING):  aspirin enteric coated 81 milliGRAM(s) Oral daily  atorvastatin 10 milliGRAM(s) Oral at bedtime  azaTHIOprine 100 milliGRAM(s) Oral daily  cloNIDine 0.1 milliGRAM(s) Oral two times a day  dextrose 40% Gel 15 Gram(s) Oral once  dextrose 50% Injectable 25 Gram(s) IV Push once  famotidine    Tablet 20 milliGRAM(s) Oral daily  gabapentin 300 milliGRAM(s) Oral two times a day  glucagon  Injectable 1 milliGRAM(s) IntraMuscular once  heparin   Injectable 5000 Unit(s) SubCutaneous every 8 hours  hydrALAZINE 25 milliGRAM(s) Oral three times a day  influenza   Vaccine 0.5 milliLiter(s) IntraMuscular once  insulin lispro (HumaLOG) Pump 1 Each SubCutaneous Continuous Pump  lactated ringers. 1000 milliLiter(s) (75 mL/Hr) IV Continuous <Continuous>  metoprolol tartrate 75 milliGRAM(s) Oral two times a day  piperacillin/tazobactam IVPB.. 3.375 Gram(s) IV Intermittent every 8 hours  sodium chloride 0.9%. 1000 milliLiter(s) (80 mL/Hr) IV Continuous <Continuous>  tacrolimus 1.5 milliGRAM(s) Oral two times a day
MEDICATIONS  (STANDING):  aspirin enteric coated 81 milliGRAM(s) Oral daily  atorvastatin 10 milliGRAM(s) Oral at bedtime  azaTHIOprine 100 milliGRAM(s) Oral daily  cloNIDine 0.1 milliGRAM(s) Oral two times a day  dextrose 40% Gel 15 Gram(s) Oral once  dextrose 50% Injectable 25 Gram(s) IV Push once  famotidine    Tablet 20 milliGRAM(s) Oral daily  gabapentin 300 milliGRAM(s) Oral two times a day  glucagon  Injectable 1 milliGRAM(s) IntraMuscular once  heparin   Injectable 5000 Unit(s) SubCutaneous every 8 hours  hydrALAZINE 25 milliGRAM(s) Oral three times a day  influenza   Vaccine 0.5 milliLiter(s) IntraMuscular once  insulin lispro (HumaLOG) Pump 1 Each SubCutaneous Continuous Pump  lactated ringers. 1000 milliLiter(s) (75 mL/Hr) IV Continuous <Continuous>  metoprolol tartrate 75 milliGRAM(s) Oral two times a day  piperacillin/tazobactam IVPB.. 3.375 Gram(s) IV Intermittent every 8 hours  sodium chloride 0.9%. 1000 milliLiter(s) (80 mL/Hr) IV Continuous <Continuous>  tacrolimus 1.5 milliGRAM(s) Oral two times a day

## 2021-09-22 NOTE — CONSULT NOTE ADULT - SUBJECTIVE AND OBJECTIVE BOX
Patient is a 55y old  Male who presents with a chief complaint of cellulitis (22 Sep 2021 09:08)    Type 1 diabetes on insulin pump. DX age 29.  known complications of neuropathy,  CVA, CKD with kidney transplant. Sees Dr. Garcia for  Endocrine. Has upcoming appointment in October. Rx home novolog insulin pump.   Uses dexcom to check blood glucose. Has no expressed needs. Hx: CKD. episode of hypo    HPI:  Patient is a 55 y old M with PMHx of CKD s/p renal transplant, CVA, DM type 1 (on insulin pump), neuropathy, DVT, HLD, Hypertension, Obesity, CARMEN (On nocturnal NIPPV), Restrictive Lung Disease, squamous cell carcinoma presents to ED for redness/swelling R leg. 3 days ago patient cut his foot, initially there was discharge, however that stopped within the first day. Patient states that he began to see swelling, redness, and felt 5/10 throbbing non-radiating pain in RLE. He has had a history of osteomyelitis before on the same foot, was treated with IV ceftriaxone. Denies chest pain, palpitations, dyspnea, headache, dizziness, abdominal pain, n/v/d. He is a renal transplant patient on anti-rejection medication - takes tacrolimus at 930AM and 930PM. Follows with nephrology Dr. Weber and states his baseline creatinine is 1.2.    In the ED   Vitals 97.2 F | HR 79 | /96-->117/64 | RR 18 | 98% RA   Labs: h/h 12.1/36.8, BUN 29, Cr 1.5, Alb 3.2, AST 40, GFR 52, Lactate 0.3  Duplex U/S- No evidence of right lower extremity deep venous thrombosis. Subcutaneous edema is visualized throughout the right calf.  CXR- no acute infiltrates on personal read  EKG: Sinus rhythm with 1st degree AV block 64 bpm, QT/QTc 406/418 (20 Sep 2021 02:17)      PAST MEDICAL & SURGICAL HISTORY:  Hypertension    Hyperlipidemia    Diabetes mellitus  Type 1 on insulin pump    CKD (chronic kidney disease)  Renal Transplant 2013 at [facility]    Diabetic peripheral neuropathy    Obesity (BMI 30-39.9)    CVA (cerebral vascular accident)  Wallenberg Stroke  low L body sensation  low R face sensation  decreased peripheral vision  poor balance    Squamous cell carcinoma of skin of left ear    History of DVT (deep vein thrombosis)    Recurrent squamous cell carcinoma of skin  nose, L arm    Toe infection  2007 L 4th Toe surgery-amputation secondary to osteomyelitis    Ear disease  Left inner ear surgery, 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, Can NOT have MRI    Vasectomy status  s/p b/l  vasectomy    H/O foot surgery  2005 - right foot - bone fracture - s/p ORIF and subsequent removal of hardware    End-stage renal failure with renal transplant  12/2013    S/P kidney transplant    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        REVIEW OF SYSTEMS  General:	as above  Eye: no blurry vison has annual exam  Endocrine: no polyuria, no polydipsia, has episodes of hypoglycemia in mornings 3-4 times per week  Neuro: + numbess, tingling, cellulitis  Other:	      Allergies    No Known Allergies    Intolerances        MEDICATIONS  (STANDING):  aspirin enteric coated 81 milliGRAM(s) Oral daily  atorvastatin 10 milliGRAM(s) Oral at bedtime  azaTHIOprine 100 milliGRAM(s) Oral daily  cloNIDine 0.1 milliGRAM(s) Oral two times a day  dextrose 40% Gel 15 Gram(s) Oral once  dextrose 50% Injectable 25 Gram(s) IV Push once  famotidine    Tablet 20 milliGRAM(s) Oral daily  gabapentin 300 milliGRAM(s) Oral two times a day  glucagon  Injectable 1 milliGRAM(s) IntraMuscular once  heparin   Injectable 5000 Unit(s) SubCutaneous every 8 hours  hydrALAZINE 25 milliGRAM(s) Oral three times a day  influenza   Vaccine 0.5 milliLiter(s) IntraMuscular once  insulin lispro (HumaLOG) Pump 1 Each SubCutaneous Continuous Pump  metoprolol tartrate 75 milliGRAM(s) Oral two times a day  piperacillin/tazobactam IVPB.. 3.375 Gram(s) IV Intermittent every 8 hours  sodium chloride 0.9%. 1000 milliLiter(s) (80 mL/Hr) IV Continuous <Continuous>  tacrolimus 1.5 milliGRAM(s) Oral two times a day

## 2021-09-22 NOTE — PROVIDER CONTACT NOTE (HYPOGLYCEMIA EVENT) - NS PROVIDER CONTACT BACKGROUND-HYPO
Age: 55y    Gender: Male    POCT Blood Glucose:  247 mg/dL (09-22-21 @ 11:54)  114 mg/dL (09-22-21 @ 08:44)  84 mg/dL (09-22-21 @ 08:26)  57 mg/dL (09-22-21 @ 08:07)  53 mg/dL (09-22-21 @ 08:06)  62 mg/dL (09-22-21 @ 07:55)  161 mg/dL (09-21-21 @ 21:43)      eMAR:  atorvastatin   10 milliGRAM(s) Oral (09-21-21 @ 21:34)

## 2021-09-22 NOTE — DISCHARGE NOTE PROVIDER - CARE PROVIDER_API CALL
Kobe Miranda (MD)  [place] [address], [address]  [place] [address]  Phone: [phone]  Fax: [phone]  Follow Up Time:

## 2021-09-22 NOTE — PROGRESS NOTE ADULT - ATTENDING COMMENTS
55 y old M with PMHx of CKD s/p renal transplant, CVA, s/p negative ILR monitor, never removed, DM type 1 (on insulin pump), neuropathy, DVT, HLD, Hypertension, Obesity, CARMEN (On nocturnal NIPPV), Restrictive Lung Disease, squamous cell carcinoma presents to ED for redness/swelling R leg, for I and D under local anesthesia:    Cellulitis R Leg    sp I & D  follow up podiatry, ID recs
No acute ischemia, arrhythmia or volume overload.  BP reasonably controlled.  Cardiovascular status otherwise without acute change.  Continue present management from a cardiovascular perspective.   optimized for low risk procedure on rle today. Will follow
The patient was seen and evaluated independently by the attending physician.  - planning for OR tomorrow  - c/w abx  - monitor renal indices closely
The patient was seen and evaluated independently by the attending physician.  - planning for I+D today. will reassess pt post-op pending OR findings to further determine dispo plan. ? outpatient management, f/u surgery recs

## 2021-09-22 NOTE — PROVIDER CONTACT NOTE (HYPOGLYCEMIA EVENT) - NS PROVIDER CONTACT ASSESS-HYPO
Pt AXOX4, has Insulin pump. Pt stated that he didn't eat anything since last night . Followed the hypoglycemia protocol. NP Nadira and RN  educated the pt on diabetes.

## 2021-09-22 NOTE — DISCHARGE NOTE NURSING/CASE MANAGEMENT/SOCIAL WORK - NSDCVIVACCINE_GEN_ALL_CORE_FT
influenza, injectable, quadrivalent, preservative free; 27-Nov-2015 13:32; Jv Zhu (RN); [employer]; [id number]; IntraMuscular; Deltoid Right.; 0.5 milliLiter(s); VIS (VIS Published: 07-Aug-2015, VIS Presented: 27-Nov-2015);   influenza, injectable, quadrivalent, preservative free; 22-Sep-2021 13:06; Eugene Mccarthy (RN); [employer]; [id number] (Exp. Date: 30-Jun-2022); IntraMuscular; Deltoid Left.; 0.5 milliLiter(s); VIS (VIS Published: 15-Aug-2019, VIS Presented: 22-Sep-2021);

## 2021-09-23 LAB
CULTURE RESULTS: SIGNIFICANT CHANGE UP
SPECIMEN SOURCE: SIGNIFICANT CHANGE UP
SURGICAL PATHOLOGY STUDY: SIGNIFICANT CHANGE UP

## 2021-09-24 LAB
-  AMPICILLIN: SIGNIFICANT CHANGE UP
-  TETRACYCLINE: SIGNIFICANT CHANGE UP
-  VANCOMYCIN: SIGNIFICANT CHANGE UP
CULTURE RESULTS: SIGNIFICANT CHANGE UP
METHOD TYPE: SIGNIFICANT CHANGE UP
ORGANISM # SPEC MICROSCOPIC CNT: SIGNIFICANT CHANGE UP
ORGANISM # SPEC MICROSCOPIC CNT: SIGNIFICANT CHANGE UP
SPECIMEN SOURCE: SIGNIFICANT CHANGE UP

## 2021-09-25 LAB
CULTURE RESULTS: SIGNIFICANT CHANGE UP
CULTURE RESULTS: SIGNIFICANT CHANGE UP
SPECIMEN SOURCE: SIGNIFICANT CHANGE UP
SPECIMEN SOURCE: SIGNIFICANT CHANGE UP

## 2021-09-30 ENCOUNTER — OUTPATIENT (OUTPATIENT)
Dept: OUTPATIENT SERVICES | Facility: HOSPITAL | Age: 56
LOS: 1 days | Discharge: HOME | End: 2021-09-30
Payer: MEDICARE

## 2021-09-30 ENCOUNTER — APPOINTMENT (OUTPATIENT)
Dept: WOUND CARE | Facility: HOSPITAL | Age: 56
End: 2021-09-30
Payer: MEDICARE

## 2021-09-30 VITALS
WEIGHT: 310 LBS | RESPIRATION RATE: 20 BRPM | TEMPERATURE: 98.1 F | HEART RATE: 60 BPM | SYSTOLIC BLOOD PRESSURE: 178 MMHG | BODY MASS INDEX: 41.08 KG/M2 | DIASTOLIC BLOOD PRESSURE: 83 MMHG | OXYGEN SATURATION: 99 % | HEIGHT: 73 IN

## 2021-09-30 DIAGNOSIS — Z89.421 ACQUIRED ABSENCE OF OTHER RIGHT TOE(S): Chronic | ICD-10-CM

## 2021-09-30 DIAGNOSIS — Z09 ENCOUNTER FOR FOLLOW-UP EXAMINATION AFTER COMPLETED TREATMENT FOR CONDITIONS OTHER THAN MALIGNANT NEOPLASM: ICD-10-CM

## 2021-09-30 DIAGNOSIS — N18.6 END STAGE RENAL DISEASE: Chronic | ICD-10-CM

## 2021-09-30 DIAGNOSIS — Z94.0 KIDNEY TRANSPLANT STATUS: Chronic | ICD-10-CM

## 2021-09-30 PROCEDURE — 99214 OFFICE O/P EST MOD 30 MIN: CPT

## 2021-09-30 PROCEDURE — G0463: CPT

## 2021-09-30 RX ORDER — ERGOCALCIFEROL 1.25 MG/1
1.25 MG CAPSULE, LIQUID FILLED ORAL
Qty: 8 | Refills: 0 | Status: DISCONTINUED | COMMUNITY
Start: 2019-12-10 | End: 2021-09-30

## 2021-09-30 RX ORDER — MUPIROCIN 20 MG/G
2 OINTMENT TOPICAL TWICE DAILY
Qty: 1 | Refills: 5 | Status: DISCONTINUED | COMMUNITY
Start: 2020-09-11 | End: 2021-09-30

## 2021-09-30 RX ORDER — CEPHALEXIN 500 MG/1
500 CAPSULE ORAL 4 TIMES DAILY
Refills: 0 | Status: DISCONTINUED | COMMUNITY
End: 2021-09-30

## 2021-10-01 NOTE — REVIEW OF SYSTEMS
[Fever] : no fever [Chills] : no chills [Eye Pain] : no eye pain [Loss Of Hearing] : no hearing loss [Shortness Of Breath] : no shortness of breath [Abdominal Pain] : no abdominal pain [Vomiting] : no vomiting [Skin Lesions] : no skin lesions [Skin Wound] : no skin wound [Anxiety] : no anxiety [Easy Bleeding] : no tendency for easy bleeding [FreeTextEntry5] : HTN,HLD [de-identified] : currently no open ulcers  [de-identified] : IDDM with neuropathy  [de-identified] : MARILUZ

## 2021-10-01 NOTE — PLAN
[FreeTextEntry1] : Patient examined and evaluated at this time.\par Continue local wound care and offloading.\par Pt remains a high risk for amputation, limb loss, sepsis, and death.\par Spent 30 minutes for patient care and medical decision making.\par Patient to follow up in 1 week.\par

## 2021-10-01 NOTE — PHYSICAL EXAM
[4 x 4] : 4 x 4  [0] : left 0 [1+] : left 1+ [Oriented to Person] : oriented to person [Oriented to Place] : oriented to place [Oriented to Time] : oriented to time [Calm] : calm [Varicose Veins Of Lower Extremities] : not present [] : not present [Purpura] : no purpura  [Petechiae] : no petechiae [Skin Ulcer] : no ulcer [Skin Induration] : no induration [de-identified] : adult WM, NAD, alert, Ox 3. [de-identified] : right foot ulcer down to skin, subcutaneous tissue, fat. [FreeTextEntry1] : Right plantar foot  [FreeTextEntry2] : 1.6 [FreeTextEntry3] : 0.8 [de-identified] : Serous/sanguinous [FreeTextEntry4] : 0.2 [de-identified] : Callus  [de-identified] : Expressed comfort post ACE application. [de-identified] : Silver alginate [de-identified] : Cleansed with NS\par Kerlix  [FreeTextEntry7] : Right lateral foot  [FreeTextEntry8] : 0.1 [FreeTextEntry9] : 0.4 [de-identified] : 0.1 [de-identified] : Serous/sanguinous [de-identified] : Callus  [de-identified] : Expressed comfort post ACE application. [de-identified] : Silver alginate [de-identified] : Cleansed with NS\par Kerlix  [de-identified] : Dorsalis Pedis:  +2 Bilateral \par Posterior Tibialis: +1 Bilateral \par Doppler pulses:  N/A\par Extremity color: Normal \par Extremity temperature: Warm \par Capillary refill: < 3 sec\par \par  [TWNoteComboBox4] : Small [de-identified] : other [de-identified] : None [de-identified] : None [de-identified] : 100% [de-identified] : No [de-identified] : Ace wraps [de-identified] : Every other day [de-identified] : Primary Dressing [de-identified] : Small [de-identified] : other [de-identified] : None [de-identified] : None [de-identified] : 100% [de-identified] : No [de-identified] : Ace wraps [de-identified] : Every other day [de-identified] : Primary Dressing

## 2021-10-01 NOTE — HISTORY OF PRESENT ILLNESS
[FreeTextEntry1] : 9/17/21 patient cut his right foot. During the next few days his right leg began to swell causing pain so he went to [facility] ED. Admitted 9/20/21. Seen by podiatry and ID. Duplex negative for DVT. x - ray and MRI negative for osteo. Patient had I & D and debridement with lavage by Dr. Araiza on 9/21/21. Had IV zosyn and was discharged with PO Vantin that he completed 2 days ago. Patient accompanied by parent.

## 2021-10-01 NOTE — ASSESSMENT
[Verbal] : Verbal [Written] : Written [Demo] : Demo [Patient] : Patient [Good - alert, interested, motivated] : Good - alert, interested, motivated [Verbalizes knowledge/Understanding] : Verbalizes knowledge/understanding [Dressing changes] : dressing changes [Foot Care] : foot care [Skin Care] : skin care [Signs and symptoms of infection] : sign and symptoms of infection [Nutrition] : nutrition [How and When to Call] : how and when to call [Pain Management] : pain management [Off-loading] : off-loading [Patient responsibility to plan of care] : patient responsibility to plan of care [Glycemic Control] : glycemic control [] : Yes [Stable] : stable [Home] : Home [Ambulatory] : Ambulatory [Not Applicable - Long Term Care/Home Health Agency] : Long Term Care/Home Health Agency: Not Applicable [FreeTextEntry2] : Infection prevention\par Localized wound care \par Goal of remaining pain free regarding wounds.\par  [FreeTextEntry4] : Follow up in 1 week

## 2021-10-04 DIAGNOSIS — L84 CORNS AND CALLOSITIES: ICD-10-CM

## 2021-10-04 DIAGNOSIS — Z86.73 PERSONAL HISTORY OF TRANSIENT ISCHEMIC ATTACK (TIA), AND CEREBRAL INFARCTION WITHOUT RESIDUAL DEFICITS: ICD-10-CM

## 2021-10-04 DIAGNOSIS — Z86.16 PERSONAL HISTORY OF COVID-19: ICD-10-CM

## 2021-10-04 DIAGNOSIS — E11.621 TYPE 2 DIABETES MELLITUS WITH FOOT ULCER: ICD-10-CM

## 2021-10-04 DIAGNOSIS — L97.412 NON-PRESSURE CHRONIC ULCER OF RIGHT HEEL AND MIDFOOT WITH FAT LAYER EXPOSED: ICD-10-CM

## 2021-10-04 DIAGNOSIS — Z79.899 OTHER LONG TERM (CURRENT) DRUG THERAPY: ICD-10-CM

## 2021-10-04 DIAGNOSIS — Z83.3 FAMILY HISTORY OF DIABETES MELLITUS: ICD-10-CM

## 2021-10-04 DIAGNOSIS — Z80.3 FAMILY HISTORY OF MALIGNANT NEOPLASM OF BREAST: ICD-10-CM

## 2021-10-04 DIAGNOSIS — Z87.81 PERSONAL HISTORY OF (HEALED) TRAUMATIC FRACTURE: ICD-10-CM

## 2021-10-04 DIAGNOSIS — Z80.8 FAMILY HISTORY OF MALIGNANT NEOPLASM OF OTHER ORGANS OR SYSTEMS: ICD-10-CM

## 2021-10-04 DIAGNOSIS — Z85.828 PERSONAL HISTORY OF OTHER MALIGNANT NEOPLASM OF SKIN: ICD-10-CM

## 2021-10-04 DIAGNOSIS — Z89.422 ACQUIRED ABSENCE OF OTHER LEFT TOE(S): ICD-10-CM

## 2021-10-04 DIAGNOSIS — G47.33 OBSTRUCTIVE SLEEP APNEA (ADULT) (PEDIATRIC): ICD-10-CM

## 2021-10-04 DIAGNOSIS — Z89.421 ACQUIRED ABSENCE OF OTHER RIGHT TOE(S): ICD-10-CM

## 2021-10-04 DIAGNOSIS — Z94.0 KIDNEY TRANSPLANT STATUS: ICD-10-CM

## 2021-10-04 DIAGNOSIS — E11.40 TYPE 2 DIABETES MELLITUS WITH DIABETIC NEUROPATHY, UNSPECIFIED: ICD-10-CM

## 2021-10-04 DIAGNOSIS — Z79.82 LONG TERM (CURRENT) USE OF ASPIRIN: ICD-10-CM

## 2021-10-04 DIAGNOSIS — E78.5 HYPERLIPIDEMIA, UNSPECIFIED: ICD-10-CM

## 2021-10-04 DIAGNOSIS — Z86.718 PERSONAL HISTORY OF OTHER VENOUS THROMBOSIS AND EMBOLISM: ICD-10-CM

## 2021-10-04 DIAGNOSIS — Z79.4 LONG TERM (CURRENT) USE OF INSULIN: ICD-10-CM

## 2021-10-07 ENCOUNTER — APPOINTMENT (OUTPATIENT)
Dept: WOUND CARE | Facility: HOSPITAL | Age: 56
End: 2021-10-07
Payer: MEDICARE

## 2021-10-07 ENCOUNTER — OUTPATIENT (OUTPATIENT)
Dept: OUTPATIENT SERVICES | Facility: HOSPITAL | Age: 56
LOS: 1 days | Discharge: HOME | End: 2021-10-07
Payer: MEDICARE

## 2021-10-07 VITALS
HEIGHT: 73 IN | RESPIRATION RATE: 20 BRPM | SYSTOLIC BLOOD PRESSURE: 131 MMHG | BODY MASS INDEX: 41.08 KG/M2 | HEART RATE: 69 BPM | TEMPERATURE: 98.5 F | DIASTOLIC BLOOD PRESSURE: 73 MMHG | WEIGHT: 310 LBS | OXYGEN SATURATION: 95 %

## 2021-10-07 DIAGNOSIS — Z87.81 PERSONAL HISTORY OF (HEALED) TRAUMATIC FRACTURE: ICD-10-CM

## 2021-10-07 DIAGNOSIS — Z86.718 PERSONAL HISTORY OF OTHER VENOUS THROMBOSIS AND EMBOLISM: ICD-10-CM

## 2021-10-07 DIAGNOSIS — Z83.3 FAMILY HISTORY OF DIABETES MELLITUS: ICD-10-CM

## 2021-10-07 DIAGNOSIS — N18.6 END STAGE RENAL DISEASE: Chronic | ICD-10-CM

## 2021-10-07 DIAGNOSIS — Z79.4 LONG TERM (CURRENT) USE OF INSULIN: ICD-10-CM

## 2021-10-07 DIAGNOSIS — G47.33 OBSTRUCTIVE SLEEP APNEA (ADULT) (PEDIATRIC): ICD-10-CM

## 2021-10-07 DIAGNOSIS — Z80.8 FAMILY HISTORY OF MALIGNANT NEOPLASM OF OTHER ORGANS OR SYSTEMS: ICD-10-CM

## 2021-10-07 DIAGNOSIS — Z94.0 KIDNEY TRANSPLANT STATUS: ICD-10-CM

## 2021-10-07 DIAGNOSIS — E78.5 HYPERLIPIDEMIA, UNSPECIFIED: ICD-10-CM

## 2021-10-07 DIAGNOSIS — L84 CORNS AND CALLOSITIES: ICD-10-CM

## 2021-10-07 DIAGNOSIS — E11.40 TYPE 2 DIABETES MELLITUS WITH DIABETIC NEUROPATHY, UNSPECIFIED: ICD-10-CM

## 2021-10-07 DIAGNOSIS — Z89.421 ACQUIRED ABSENCE OF OTHER RIGHT TOE(S): ICD-10-CM

## 2021-10-07 DIAGNOSIS — Z89.422 ACQUIRED ABSENCE OF OTHER LEFT TOE(S): ICD-10-CM

## 2021-10-07 DIAGNOSIS — Z79.899 OTHER LONG TERM (CURRENT) DRUG THERAPY: ICD-10-CM

## 2021-10-07 DIAGNOSIS — Z85.828 PERSONAL HISTORY OF OTHER MALIGNANT NEOPLASM OF SKIN: ICD-10-CM

## 2021-10-07 DIAGNOSIS — L97.412 NON-PRESSURE CHRONIC ULCER OF RIGHT HEEL AND MIDFOOT WITH FAT LAYER EXPOSED: ICD-10-CM

## 2021-10-07 DIAGNOSIS — E11.621 TYPE 2 DIABETES MELLITUS WITH FOOT ULCER: ICD-10-CM

## 2021-10-07 DIAGNOSIS — Z79.82 LONG TERM (CURRENT) USE OF ASPIRIN: ICD-10-CM

## 2021-10-07 DIAGNOSIS — Z89.421 ACQUIRED ABSENCE OF OTHER RIGHT TOE(S): Chronic | ICD-10-CM

## 2021-10-07 DIAGNOSIS — Z86.16 PERSONAL HISTORY OF COVID-19: ICD-10-CM

## 2021-10-07 DIAGNOSIS — Z94.0 KIDNEY TRANSPLANT STATUS: Chronic | ICD-10-CM

## 2021-10-07 DIAGNOSIS — Z09 ENCOUNTER FOR FOLLOW-UP EXAMINATION AFTER COMPLETED TREATMENT FOR CONDITIONS OTHER THAN MALIGNANT NEOPLASM: ICD-10-CM

## 2021-10-07 DIAGNOSIS — Z86.73 PERSONAL HISTORY OF TRANSIENT ISCHEMIC ATTACK (TIA), AND CEREBRAL INFARCTION WITHOUT RESIDUAL DEFICITS: ICD-10-CM

## 2021-10-07 DIAGNOSIS — Z80.3 FAMILY HISTORY OF MALIGNANT NEOPLASM OF BREAST: ICD-10-CM

## 2021-10-07 PROCEDURE — G0463: CPT

## 2021-10-07 PROCEDURE — 99213 OFFICE O/P EST LOW 20 MIN: CPT

## 2021-10-07 NOTE — PLAN
[FreeTextEntry1] : continue off loading and wound care  Spent 20 minutes for patient care and medical decision making.\par

## 2021-10-07 NOTE — PHYSICAL EXAM
[0] : left 0 [1+] : left 1+ [Varicose Veins Of Lower Extremities] : not present [] : not present [Purpura] : no purpura  [Petechiae] : no petechiae [Skin Ulcer] : no ulcer [Skin Induration] : no induration [Oriented to Person] : oriented to person [Oriented to Place] : oriented to place [Oriented to Time] : oriented to time [Calm] : calm [de-identified] : adult WM, NAD, alert, Ox 3. [de-identified] : HTN, HLD [de-identified] : right foot ulcer down to skin, subcutaneous tissue, fat. [de-identified] : DPM Shaved Callus and Cauterized with ANGO3 [FreeTextEntry1] : Right Plantar foot [FreeTextEntry2] : 1.3 [FreeTextEntry3] : 1.0 [FreeTextEntry4] : 0.2 [de-identified] : Serosanguineous [de-identified] : Callus [de-identified] : For Support  [de-identified] : Silver Alginate [de-identified] : Cleansed with Normal Saline\par  [FreeTextEntry7] : Right Lateral Foot- Closed [de-identified] : No Product [de-identified] : Cleansed with Normal Saline\par  [TWNoteComboBox4] : Moderate [TWNoteComboBox5] : No [de-identified] : No [de-identified] : None [de-identified] : None [de-identified] : 100% [de-identified] : No [de-identified] : Ace wraps [de-identified] : 3x Weekly

## 2021-10-07 NOTE — ASSESSMENT
[Verbal] : Verbal [Patient] : Patient [Good - alert, interested, motivated] : Good - alert, interested, motivated [Verbalizes knowledge/Understanding] : Verbalizes knowledge/understanding [Dressing changes] : dressing changes [Foot Care] : foot care [Skin Care] : skin care [Signs and symptoms of infection] : sign and symptoms of infection [How and When to Call] : how and when to call [Compression Therapy] : compression therapy [Patient responsibility to plan of care] : patient responsibility to plan of care [Stable] : stable [Home] : Home [Ambulatory] : Ambulatory [Not Applicable - Long Term Care/Home Health Agency] : Long Term Care/Home Health Agency: Not Applicable [] : No [FreeTextEntry2] : Restore Skin Integrity\par Infection Control\par Localized wound care\par Maintain acceptable pain levels at satisfactory relief.\par Demonstrates use of both nonpharmacological and pharmacological pain relief strategies [FreeTextEntry4] : F/U to [facility] in 1 week

## 2021-10-07 NOTE — REVIEW OF SYSTEMS
[Fever] : no fever [Chills] : no chills [Eye Pain] : no eye pain [Loss Of Hearing] : no hearing loss [Shortness Of Breath] : no shortness of breath [Abdominal Pain] : no abdominal pain [Vomiting] : no vomiting [Skin Lesions] : no skin lesions [Skin Wound] : no skin wound [Anxiety] : no anxiety [Easy Bleeding] : no tendency for easy bleeding [FreeTextEntry5] : HTN,HLD [FreeTextEntry8] : Kidney Transplant  [de-identified] : plantar right foot midfoot , lateral clean DFU 2  [de-identified] : IDDM with neuropathy  [de-identified] : MARILUZ

## 2021-10-13 ENCOUNTER — APPOINTMENT (OUTPATIENT)
Dept: WOUND CARE | Facility: HOSPITAL | Age: 56
End: 2021-10-13
Payer: MEDICARE

## 2021-10-13 ENCOUNTER — OUTPATIENT (OUTPATIENT)
Dept: OUTPATIENT SERVICES | Facility: HOSPITAL | Age: 56
LOS: 1 days | Discharge: HOME | End: 2021-10-13
Payer: MEDICARE

## 2021-10-13 VITALS
TEMPERATURE: 96.8 F | WEIGHT: 310 LBS | HEART RATE: 62 BPM | OXYGEN SATURATION: 95 % | RESPIRATION RATE: 18 BRPM | HEIGHT: 73 IN | SYSTOLIC BLOOD PRESSURE: 125 MMHG | DIASTOLIC BLOOD PRESSURE: 74 MMHG | BODY MASS INDEX: 41.08 KG/M2

## 2021-10-13 DIAGNOSIS — Z09 ENCOUNTER FOR FOLLOW-UP EXAMINATION AFTER COMPLETED TREATMENT FOR CONDITIONS OTHER THAN MALIGNANT NEOPLASM: ICD-10-CM

## 2021-10-13 DIAGNOSIS — N18.6 END STAGE RENAL DISEASE: Chronic | ICD-10-CM

## 2021-10-13 DIAGNOSIS — Z94.0 KIDNEY TRANSPLANT STATUS: Chronic | ICD-10-CM

## 2021-10-13 DIAGNOSIS — Z89.421 ACQUIRED ABSENCE OF OTHER RIGHT TOE(S): Chronic | ICD-10-CM

## 2021-10-13 PROCEDURE — G0463: CPT

## 2021-10-13 PROCEDURE — 99213 OFFICE O/P EST LOW 20 MIN: CPT

## 2021-10-13 NOTE — PHYSICAL EXAM
[4 x 4] : 4 x 4  [0] : left 0 [1+] : left 1+ [Varicose Veins Of Lower Extremities] : not present [] : not present [Purpura] : no purpura  [Petechiae] : no petechiae [Skin Ulcer] : no ulcer [Skin Induration] : no induration [Oriented to Person] : oriented to person [Oriented to Place] : oriented to place [Oriented to Time] : oriented to time [Calm] : calm [de-identified] : adult WM, NAD, alert, Ox 3. [de-identified] : right foot ulcer down to skin, subcutaneous tissue, fat. [FreeTextEntry1] : Right plantar foot  [FreeTextEntry2] : 1.1 [FreeTextEntry3] : 0.9 [FreeTextEntry4] : 0.2 [de-identified] : Serous/sanguinous [de-identified] : Callus  [de-identified] : Expressed comfort post ACE application. [de-identified] : Cleansed with NS\par Kerlix \par \par ** Callus shaved by DPM [de-identified] : Silver alginate [de-identified] : Right plantar foot - 2 small aounds within measurement (Superior to wound #1) (New)   [de-identified] : 1.1 [de-identified] : 0.4 [de-identified] : 0.1 [de-identified] : Serous/sanguinous [de-identified] : Callus  [de-identified] : Expressed comfort post ACE application. [de-identified] : Silver alginate [de-identified] : Cleansed with NS\par Kerlix  [TWNoteComboBox4] : Small [de-identified] : other [de-identified] : None [de-identified] : None [de-identified] : 100% [de-identified] : No [de-identified] : Ace wraps [de-identified] : Every other day [de-identified] : Primary Dressing [de-identified] : Small [de-identified] : other [de-identified] : None [de-identified] : None [de-identified] : 100% [de-identified] : No [de-identified] : Ace wraps [de-identified] : Every other day [de-identified] : Primary Dressing

## 2021-10-13 NOTE — ASSESSMENT
[Verbal] : Verbal [Written] : Written [Demo] : Demo [Patient] : Patient [Family member] : Family member [Good - alert, interested, motivated] : Good - alert, interested, motivated [Verbalizes knowledge/Understanding] : Verbalizes knowledge/understanding [Dressing changes] : dressing changes [Foot Care] : foot care [Skin Care] : skin care [Signs and symptoms of infection] : sign and symptoms of infection [Nutrition] : nutrition [How and When to Call] : how and when to call [Pain Management] : pain management [Off-loading] : off-loading [Compression Therapy] : compression therapy [Patient responsibility to plan of care] : patient responsibility to plan of care [Glycemic Control] : glycemic control [Stable] : stable [Home] : Home [Ambulatory] : Ambulatory [Not Applicable - Long Term Care/Home Health Agency] : Long Term Care/Home Health Agency: Not Applicable [] : No [FreeTextEntry2] : Infection prevention\par Localized wound care \par Goal of remaining pain free regarding wounds.\par Promote optimal nutrition  [FreeTextEntry4] : Patient met with Gianluca from [facility] to discuss offloading shoe \par Follow up in 1 week

## 2021-10-13 NOTE — REVIEW OF SYSTEMS
[Fever] : no fever [Chills] : no chills [Eye Pain] : no eye pain [Loss Of Hearing] : no hearing loss [Shortness Of Breath] : no shortness of breath [Abdominal Pain] : no abdominal pain [Vomiting] : no vomiting [Skin Lesions] : no skin lesions [Skin Wound] : no skin wound [Anxiety] : no anxiety [Easy Bleeding] : no tendency for easy bleeding [FreeTextEntry5] : HTN,HLD [FreeTextEntry8] : Kidney Transplant  [de-identified] : plantar right foot midfoot , lateral clean DFU 2  [de-identified] : IDDM with neuropathy  [de-identified] : MARILUZ

## 2021-10-20 ENCOUNTER — OUTPATIENT (OUTPATIENT)
Dept: OUTPATIENT SERVICES | Facility: HOSPITAL | Age: 56
LOS: 1 days | Discharge: HOME | End: 2021-10-20
Payer: MEDICARE

## 2021-10-20 ENCOUNTER — APPOINTMENT (OUTPATIENT)
Dept: WOUND CARE | Facility: HOSPITAL | Age: 56
End: 2021-10-20
Payer: MEDICARE

## 2021-10-20 VITALS
BODY MASS INDEX: 41.08 KG/M2 | DIASTOLIC BLOOD PRESSURE: 68 MMHG | WEIGHT: 310 LBS | TEMPERATURE: 97 F | OXYGEN SATURATION: 93 % | RESPIRATION RATE: 18 BRPM | SYSTOLIC BLOOD PRESSURE: 140 MMHG | HEIGHT: 73 IN | HEART RATE: 58 BPM

## 2021-10-20 DIAGNOSIS — Z94.0 KIDNEY TRANSPLANT STATUS: Chronic | ICD-10-CM

## 2021-10-20 DIAGNOSIS — Z89.421 ACQUIRED ABSENCE OF OTHER RIGHT TOE(S): Chronic | ICD-10-CM

## 2021-10-20 DIAGNOSIS — Z09 ENCOUNTER FOR FOLLOW-UP EXAMINATION AFTER COMPLETED TREATMENT FOR CONDITIONS OTHER THAN MALIGNANT NEOPLASM: ICD-10-CM

## 2021-10-20 DIAGNOSIS — N18.6 END STAGE RENAL DISEASE: Chronic | ICD-10-CM

## 2021-10-20 PROCEDURE — 99213 OFFICE O/P EST LOW 20 MIN: CPT

## 2021-10-20 PROCEDURE — G0463: CPT

## 2021-10-21 DIAGNOSIS — Z79.899 OTHER LONG TERM (CURRENT) DRUG THERAPY: ICD-10-CM

## 2021-10-21 DIAGNOSIS — E11.621 TYPE 2 DIABETES MELLITUS WITH FOOT ULCER: ICD-10-CM

## 2021-10-21 DIAGNOSIS — E78.5 HYPERLIPIDEMIA, UNSPECIFIED: ICD-10-CM

## 2021-10-21 DIAGNOSIS — Z87.81 PERSONAL HISTORY OF (HEALED) TRAUMATIC FRACTURE: ICD-10-CM

## 2021-10-21 DIAGNOSIS — Z89.421 ACQUIRED ABSENCE OF OTHER RIGHT TOE(S): ICD-10-CM

## 2021-10-21 DIAGNOSIS — Z89.422 ACQUIRED ABSENCE OF OTHER LEFT TOE(S): ICD-10-CM

## 2021-10-21 DIAGNOSIS — L84 CORNS AND CALLOSITIES: ICD-10-CM

## 2021-10-21 DIAGNOSIS — L97.412 NON-PRESSURE CHRONIC ULCER OF RIGHT HEEL AND MIDFOOT WITH FAT LAYER EXPOSED: ICD-10-CM

## 2021-10-21 DIAGNOSIS — Z86.73 PERSONAL HISTORY OF TRANSIENT ISCHEMIC ATTACK (TIA), AND CEREBRAL INFARCTION WITHOUT RESIDUAL DEFICITS: ICD-10-CM

## 2021-10-21 DIAGNOSIS — Z86.718 PERSONAL HISTORY OF OTHER VENOUS THROMBOSIS AND EMBOLISM: ICD-10-CM

## 2021-10-21 DIAGNOSIS — Z80.3 FAMILY HISTORY OF MALIGNANT NEOPLASM OF BREAST: ICD-10-CM

## 2021-10-21 DIAGNOSIS — Z79.82 LONG TERM (CURRENT) USE OF ASPIRIN: ICD-10-CM

## 2021-10-21 DIAGNOSIS — Z80.8 FAMILY HISTORY OF MALIGNANT NEOPLASM OF OTHER ORGANS OR SYSTEMS: ICD-10-CM

## 2021-10-21 DIAGNOSIS — Z85.828 PERSONAL HISTORY OF OTHER MALIGNANT NEOPLASM OF SKIN: ICD-10-CM

## 2021-10-21 DIAGNOSIS — Z79.4 LONG TERM (CURRENT) USE OF INSULIN: ICD-10-CM

## 2021-10-21 DIAGNOSIS — G47.33 OBSTRUCTIVE SLEEP APNEA (ADULT) (PEDIATRIC): ICD-10-CM

## 2021-10-21 DIAGNOSIS — E11.40 TYPE 2 DIABETES MELLITUS WITH DIABETIC NEUROPATHY, UNSPECIFIED: ICD-10-CM

## 2021-10-21 DIAGNOSIS — Z86.16 PERSONAL HISTORY OF COVID-19: ICD-10-CM

## 2021-10-21 DIAGNOSIS — Z83.3 FAMILY HISTORY OF DIABETES MELLITUS: ICD-10-CM

## 2021-10-21 DIAGNOSIS — Z94.0 KIDNEY TRANSPLANT STATUS: ICD-10-CM

## 2021-10-21 NOTE — REVIEW OF SYSTEMS
[Fever] : no fever [Chills] : no chills [Eye Pain] : no eye pain [Loss Of Hearing] : no hearing loss [Shortness Of Breath] : no shortness of breath [Abdominal Pain] : no abdominal pain [Vomiting] : no vomiting [Skin Lesions] : no skin lesions [Skin Wound] : no skin wound [Anxiety] : no anxiety [Easy Bleeding] : no tendency for easy bleeding [FreeTextEntry5] : HTN,HLD [FreeTextEntry8] : Kidney Transplant  [FreeTextEntry9] : Associated Diabetic Charcot foot  [de-identified] : plantar right foot midfoot , lateral clean DFU 2  [de-identified] : IDDM with neuropathy  [de-identified] : MARILUZ

## 2021-10-21 NOTE — PHYSICAL EXAM
[0] : left 0 [1+] : left 1+ [Varicose Veins Of Lower Extremities] : not present [] : not present [Purpura] : no purpura  [Petechiae] : no petechiae [Skin Ulcer] : ulcer [Skin Induration] : no induration [Alert] : alert [Oriented to Person] : oriented to person [Oriented to Place] : oriented to place [Oriented to Time] : oriented to time [Calm] : calm [de-identified] : adult WM, NAD, alert, Ox 3. [de-identified] : HTN, HLD [de-identified] : Diabetic charcot foot  [de-identified] : right foot ulcer down to skin, subcutaneous tissue, fat. [de-identified] : Diabetic neuropathy  [FreeTextEntry1] : Right Plantar Foot [FreeTextEntry2] : 1.1 [FreeTextEntry3] : 1.0 [FreeTextEntry4] : 0.3 [de-identified] : Serosanguineous [de-identified] : Callus [de-identified] : Silver Alginate [de-identified] : Cleansed with Normal Saline\par  [de-identified] : Right Plantar Foot-(Superior to Wound #1) Healed [de-identified] : No Product [de-identified] : Cleansed with Normal Saline\par  [TWNoteComboBox4] : Moderate [TWNoteComboBox5] : No [de-identified] : No [de-identified] : None [de-identified] : None [de-identified] : 100% [de-identified] : No [de-identified] : Ace wraps [de-identified] : 3x Weekly [de-identified] : Ace wraps

## 2021-10-21 NOTE — ASSESSMENT
[Verbal] : Verbal [Patient] : Patient [Good - alert, interested, motivated] : Good - alert, interested, motivated [Verbalizes knowledge/Understanding] : Verbalizes knowledge/understanding [Dressing changes] : dressing changes [Foot Care] : foot care [Skin Care] : skin care [Signs and symptoms of infection] : sign and symptoms of infection [How and When to Call] : how and when to call [Compression Therapy] : compression therapy [Patient responsibility to plan of care] : patient responsibility to plan of care [] : Yes [Stable] : stable [Home] : Home [Ambulatory] : Ambulatory [Not Applicable - Long Term Care/Home Health Agency] : Long Term Care/Home Health Agency: Not Applicable [FreeTextEntry2] : Restore Skin Integrity\par Infection Control\par Localized wound care\par Maintain acceptable pain levels at satisfactory relief.\par Demonstrates use of both nonpharmacological and pharmacological pain relief strategies [FreeTextEntry4] : F/U to [facility] in 1 week

## 2021-10-21 NOTE — PLAN
[FreeTextEntry1] : continue wound care and off loading , patient is high risk for limb loss and life threatening sepsis  Spent 20 minutes for patient care and medical decision making.\par

## 2021-10-27 ENCOUNTER — APPOINTMENT (OUTPATIENT)
Dept: WOUND CARE | Facility: HOSPITAL | Age: 56
End: 2021-10-27
Payer: MEDICARE

## 2021-10-27 ENCOUNTER — OUTPATIENT (OUTPATIENT)
Dept: OUTPATIENT SERVICES | Facility: HOSPITAL | Age: 56
LOS: 1 days | Discharge: HOME | End: 2021-10-27
Payer: MEDICARE

## 2021-10-27 VITALS
DIASTOLIC BLOOD PRESSURE: 71 MMHG | HEIGHT: 73 IN | RESPIRATION RATE: 18 BRPM | TEMPERATURE: 97 F | BODY MASS INDEX: 41.08 KG/M2 | HEART RATE: 58 BPM | OXYGEN SATURATION: 96 % | SYSTOLIC BLOOD PRESSURE: 160 MMHG | WEIGHT: 310 LBS

## 2021-10-27 DIAGNOSIS — N18.6 END STAGE RENAL DISEASE: Chronic | ICD-10-CM

## 2021-10-27 DIAGNOSIS — Z94.0 KIDNEY TRANSPLANT STATUS: Chronic | ICD-10-CM

## 2021-10-27 DIAGNOSIS — E11.621 TYPE 2 DIABETES MELLITUS WITH FOOT ULCER: ICD-10-CM

## 2021-10-27 DIAGNOSIS — Z89.421 ACQUIRED ABSENCE OF OTHER RIGHT TOE(S): Chronic | ICD-10-CM

## 2021-10-27 PROCEDURE — G0463: CPT

## 2021-10-27 PROCEDURE — 99213 OFFICE O/P EST LOW 20 MIN: CPT

## 2021-10-28 DIAGNOSIS — Z79.899 OTHER LONG TERM (CURRENT) DRUG THERAPY: ICD-10-CM

## 2021-10-28 DIAGNOSIS — E78.5 HYPERLIPIDEMIA, UNSPECIFIED: ICD-10-CM

## 2021-10-28 DIAGNOSIS — Z80.8 FAMILY HISTORY OF MALIGNANT NEOPLASM OF OTHER ORGANS OR SYSTEMS: ICD-10-CM

## 2021-10-28 DIAGNOSIS — Z87.81 PERSONAL HISTORY OF (HEALED) TRAUMATIC FRACTURE: ICD-10-CM

## 2021-10-28 DIAGNOSIS — Z94.0 KIDNEY TRANSPLANT STATUS: ICD-10-CM

## 2021-10-28 DIAGNOSIS — Z86.16 PERSONAL HISTORY OF COVID-19: ICD-10-CM

## 2021-10-28 DIAGNOSIS — Z83.3 FAMILY HISTORY OF DIABETES MELLITUS: ICD-10-CM

## 2021-10-28 DIAGNOSIS — E11.621 TYPE 2 DIABETES MELLITUS WITH FOOT ULCER: ICD-10-CM

## 2021-10-28 DIAGNOSIS — L84 CORNS AND CALLOSITIES: ICD-10-CM

## 2021-10-28 DIAGNOSIS — L97.412 NON-PRESSURE CHRONIC ULCER OF RIGHT HEEL AND MIDFOOT WITH FAT LAYER EXPOSED: ICD-10-CM

## 2021-10-28 DIAGNOSIS — Z79.4 LONG TERM (CURRENT) USE OF INSULIN: ICD-10-CM

## 2021-10-28 DIAGNOSIS — G47.33 OBSTRUCTIVE SLEEP APNEA (ADULT) (PEDIATRIC): ICD-10-CM

## 2021-10-28 DIAGNOSIS — Z85.828 PERSONAL HISTORY OF OTHER MALIGNANT NEOPLASM OF SKIN: ICD-10-CM

## 2021-10-28 DIAGNOSIS — Z86.718 PERSONAL HISTORY OF OTHER VENOUS THROMBOSIS AND EMBOLISM: ICD-10-CM

## 2021-10-28 DIAGNOSIS — Z89.422 ACQUIRED ABSENCE OF OTHER LEFT TOE(S): ICD-10-CM

## 2021-10-28 DIAGNOSIS — Z89.421 ACQUIRED ABSENCE OF OTHER RIGHT TOE(S): ICD-10-CM

## 2021-10-28 DIAGNOSIS — E11.40 TYPE 2 DIABETES MELLITUS WITH DIABETIC NEUROPATHY, UNSPECIFIED: ICD-10-CM

## 2021-10-28 DIAGNOSIS — Z80.3 FAMILY HISTORY OF MALIGNANT NEOPLASM OF BREAST: ICD-10-CM

## 2021-10-28 DIAGNOSIS — Z86.73 PERSONAL HISTORY OF TRANSIENT ISCHEMIC ATTACK (TIA), AND CEREBRAL INFARCTION WITHOUT RESIDUAL DEFICITS: ICD-10-CM

## 2021-10-28 DIAGNOSIS — Z79.82 LONG TERM (CURRENT) USE OF ASPIRIN: ICD-10-CM

## 2021-10-28 NOTE — REVIEW OF SYSTEMS
[Fever] : no fever [Chills] : no chills [Eye Pain] : no eye pain [Loss Of Hearing] : no hearing loss [Shortness Of Breath] : no shortness of breath [Abdominal Pain] : no abdominal pain [Vomiting] : no vomiting [Skin Lesions] : no skin lesions [Skin Wound] : no skin wound [Anxiety] : no anxiety [Easy Bleeding] : no tendency for easy bleeding [FreeTextEntry5] : HTN,HLD [FreeTextEntry8] : Kidney Transplant  [FreeTextEntry9] : Associated Diabetic Charcot foot  [de-identified] : plantar right foot midfoot , lateral clean DFU 2  [de-identified] : IDDM with neuropathy  [de-identified] : MARILUZ

## 2021-10-28 NOTE — PLAN
[FreeTextEntry1] : continue off loading , wound care , # 15 blade used to trim callus plantar right foot Spent 20 minutes for patient care and medical decision making.\par \par

## 2021-10-28 NOTE — PHYSICAL EXAM
[0] : left 0 [1+] : left 1+ [Varicose Veins Of Lower Extremities] : not present [] : not present [Purpura] : no purpura  [Petechiae] : no petechiae [Skin Ulcer] : ulcer [Skin Induration] : no induration [Alert] : alert [Oriented to Person] : oriented to person [Oriented to Place] : oriented to place [Oriented to Time] : oriented to time [Calm] : calm [de-identified] : adult WM, NAD, alert, Ox 3. [de-identified] : HTN, HLD [de-identified] : Diabetic charcot foot  [de-identified] : right foot plantar ulcer down to skin, subcutaneous tissue, fat. [de-identified] : Diabetic neuropathy  [FreeTextEntry1] : Right Plantar Foot [FreeTextEntry2] : 0.8 [FreeTextEntry3] : 0.8 [FreeTextEntry4] : 0.2 [de-identified] : Serosanguineous [de-identified] : Callus- Removed [de-identified] : Silver Alginate [de-identified] : Cleansed with Normal Saline\par  [de-identified] : Right Plantar Foot-(Superior to Wound #1) Healed [de-identified] : No Product [de-identified] : Cleansed with Normal Saline\par  [TWNoteComboBox4] : Small [TWNoteComboBox5] : No [de-identified] : No [de-identified] : None [de-identified] : None [de-identified] : 100% [de-identified] : No [de-identified] : Ace wraps [de-identified] : 3x Weekly [de-identified] : Ace wraps

## 2021-10-28 NOTE — HISTORY OF PRESENT ILLNESS
[FreeTextEntry1] : DFU 2 plantar right foot , with thickened peripheral callus , ulcer is clean , stable with no soi

## 2021-10-28 NOTE — ASSESSMENT
[Verbal] : Verbal [Patient] : Patient [Good - alert, interested, motivated] : Good - alert, interested, motivated [Verbalizes knowledge/Understanding] : Verbalizes knowledge/understanding [Dressing changes] : dressing changes [Foot Care] : foot care [Skin Care] : skin care [Signs and symptoms of infection] : sign and symptoms of infection [How and When to Call] : how and when to call [Compression Therapy] : compression therapy [Patient responsibility to plan of care] : patient responsibility to plan of care [Stable] : stable [Home] : Home [Ambulatory] : Ambulatory [Not Applicable - Long Term Care/Home Health Agency] : Long Term Care/Home Health Agency: Not Applicable [] : No [FreeTextEntry2] : Offloading/Pressure Relief \par Maintain acceptable pain levels at satisfactory relief.\par Demonstrates use of both nonpharmacological and pharmacological pain relief strategies\par Infection Prevention \par Compression Therapy\par Localized Wound Care  [FreeTextEntry4] : F/U to [facility] in 1 week

## 2021-11-04 ENCOUNTER — OUTPATIENT (OUTPATIENT)
Dept: OUTPATIENT SERVICES | Facility: HOSPITAL | Age: 56
LOS: 1 days | Discharge: HOME | End: 2021-11-04
Payer: MEDICARE

## 2021-11-04 ENCOUNTER — APPOINTMENT (OUTPATIENT)
Dept: WOUND CARE | Facility: HOSPITAL | Age: 56
End: 2021-11-04
Payer: MEDICARE

## 2021-11-04 DIAGNOSIS — N18.6 END STAGE RENAL DISEASE: Chronic | ICD-10-CM

## 2021-11-04 DIAGNOSIS — E11.621 TYPE 2 DIABETES MELLITUS WITH FOOT ULCER: ICD-10-CM

## 2021-11-04 DIAGNOSIS — Z94.0 KIDNEY TRANSPLANT STATUS: Chronic | ICD-10-CM

## 2021-11-04 DIAGNOSIS — Z89.421 ACQUIRED ABSENCE OF OTHER RIGHT TOE(S): Chronic | ICD-10-CM

## 2021-11-04 LAB
GRAM STN FLD: SIGNIFICANT CHANGE UP
SPECIMEN SOURCE: SIGNIFICANT CHANGE UP

## 2021-11-04 PROCEDURE — 87075 CULTR BACTERIA EXCEPT BLOOD: CPT

## 2021-11-04 PROCEDURE — 11045 DBRDMT SUBQ TISS EACH ADDL: CPT

## 2021-11-04 PROCEDURE — 11042 DBRDMT SUBQ TIS 1ST 20SQCM/<: CPT

## 2021-11-04 PROCEDURE — 87186 SC STD MICRODIL/AGAR DIL: CPT

## 2021-11-04 PROCEDURE — 87077 CULTURE AEROBIC IDENTIFY: CPT

## 2021-11-04 PROCEDURE — 87070 CULTURE OTHR SPECIMN AEROBIC: CPT

## 2021-11-07 DIAGNOSIS — Z89.422 ACQUIRED ABSENCE OF OTHER LEFT TOE(S): ICD-10-CM

## 2021-11-07 DIAGNOSIS — Z86.718 PERSONAL HISTORY OF OTHER VENOUS THROMBOSIS AND EMBOLISM: ICD-10-CM

## 2021-11-07 DIAGNOSIS — Z86.73 PERSONAL HISTORY OF TRANSIENT ISCHEMIC ATTACK (TIA), AND CEREBRAL INFARCTION WITHOUT RESIDUAL DEFICITS: ICD-10-CM

## 2021-11-07 DIAGNOSIS — Z79.4 LONG TERM (CURRENT) USE OF INSULIN: ICD-10-CM

## 2021-11-07 DIAGNOSIS — Z85.828 PERSONAL HISTORY OF OTHER MALIGNANT NEOPLASM OF SKIN: ICD-10-CM

## 2021-11-07 DIAGNOSIS — Z86.16 PERSONAL HISTORY OF COVID-19: ICD-10-CM

## 2021-11-07 DIAGNOSIS — Z89.421 ACQUIRED ABSENCE OF OTHER RIGHT TOE(S): ICD-10-CM

## 2021-11-07 DIAGNOSIS — Z83.3 FAMILY HISTORY OF DIABETES MELLITUS: ICD-10-CM

## 2021-11-07 DIAGNOSIS — E11.621 TYPE 2 DIABETES MELLITUS WITH FOOT ULCER: ICD-10-CM

## 2021-11-07 DIAGNOSIS — Z79.899 OTHER LONG TERM (CURRENT) DRUG THERAPY: ICD-10-CM

## 2021-11-07 DIAGNOSIS — L97.412 NON-PRESSURE CHRONIC ULCER OF RIGHT HEEL AND MIDFOOT WITH FAT LAYER EXPOSED: ICD-10-CM

## 2021-11-07 DIAGNOSIS — G47.33 OBSTRUCTIVE SLEEP APNEA (ADULT) (PEDIATRIC): ICD-10-CM

## 2021-11-07 DIAGNOSIS — Z87.81 PERSONAL HISTORY OF (HEALED) TRAUMATIC FRACTURE: ICD-10-CM

## 2021-11-07 DIAGNOSIS — E78.5 HYPERLIPIDEMIA, UNSPECIFIED: ICD-10-CM

## 2021-11-07 DIAGNOSIS — E11.40 TYPE 2 DIABETES MELLITUS WITH DIABETIC NEUROPATHY, UNSPECIFIED: ICD-10-CM

## 2021-11-07 DIAGNOSIS — Z79.82 LONG TERM (CURRENT) USE OF ASPIRIN: ICD-10-CM

## 2021-11-07 DIAGNOSIS — Z94.0 KIDNEY TRANSPLANT STATUS: ICD-10-CM

## 2021-11-07 DIAGNOSIS — Z80.8 FAMILY HISTORY OF MALIGNANT NEOPLASM OF OTHER ORGANS OR SYSTEMS: ICD-10-CM

## 2021-11-07 DIAGNOSIS — L84 CORNS AND CALLOSITIES: ICD-10-CM

## 2021-11-07 DIAGNOSIS — Z80.3 FAMILY HISTORY OF MALIGNANT NEOPLASM OF BREAST: ICD-10-CM

## 2021-11-07 LAB
-  AMPICILLIN/SULBACTAM: SIGNIFICANT CHANGE UP
-  AMPICILLIN: SIGNIFICANT CHANGE UP
-  CEFAZOLIN: SIGNIFICANT CHANGE UP
-  CLINDAMYCIN: SIGNIFICANT CHANGE UP
-  ERYTHROMYCIN: SIGNIFICANT CHANGE UP
-  GENTAMICIN: SIGNIFICANT CHANGE UP
-  OXACILLIN: SIGNIFICANT CHANGE UP
-  PENICILLIN: SIGNIFICANT CHANGE UP
-  RIFAMPIN: SIGNIFICANT CHANGE UP
-  TETRACYCLINE: SIGNIFICANT CHANGE UP
-  TETRACYCLINE: SIGNIFICANT CHANGE UP
-  TRIMETHOPRIM/SULFAMETHOXAZOLE: SIGNIFICANT CHANGE UP
-  VANCOMYCIN: SIGNIFICANT CHANGE UP
-  VANCOMYCIN: SIGNIFICANT CHANGE UP
METHOD TYPE: SIGNIFICANT CHANGE UP
METHOD TYPE: SIGNIFICANT CHANGE UP

## 2021-11-11 ENCOUNTER — APPOINTMENT (OUTPATIENT)
Dept: WOUND CARE | Facility: HOSPITAL | Age: 56
End: 2021-11-11
Payer: MEDICARE

## 2021-11-11 ENCOUNTER — OUTPATIENT (OUTPATIENT)
Dept: OUTPATIENT SERVICES | Facility: HOSPITAL | Age: 56
LOS: 1 days | Discharge: HOME | End: 2021-11-11
Payer: MEDICARE

## 2021-11-11 VITALS
DIASTOLIC BLOOD PRESSURE: 73 MMHG | OXYGEN SATURATION: 97 % | BODY MASS INDEX: 41.08 KG/M2 | HEART RATE: 65 BPM | WEIGHT: 310 LBS | SYSTOLIC BLOOD PRESSURE: 146 MMHG | TEMPERATURE: 97.2 F | HEIGHT: 73 IN | RESPIRATION RATE: 18 BRPM

## 2021-11-11 DIAGNOSIS — Z83.3 FAMILY HISTORY OF DIABETES MELLITUS: ICD-10-CM

## 2021-11-11 DIAGNOSIS — Z80.8 FAMILY HISTORY OF MALIGNANT NEOPLASM OF OTHER ORGANS OR SYSTEMS: ICD-10-CM

## 2021-11-11 DIAGNOSIS — N18.6 END STAGE RENAL DISEASE: Chronic | ICD-10-CM

## 2021-11-11 DIAGNOSIS — Z86.73 PERSONAL HISTORY OF TRANSIENT ISCHEMIC ATTACK (TIA), AND CEREBRAL INFARCTION WITHOUT RESIDUAL DEFICITS: ICD-10-CM

## 2021-11-11 DIAGNOSIS — Z94.0 KIDNEY TRANSPLANT STATUS: Chronic | ICD-10-CM

## 2021-11-11 DIAGNOSIS — Z79.4 LONG TERM (CURRENT) USE OF INSULIN: ICD-10-CM

## 2021-11-11 DIAGNOSIS — Z86.718 PERSONAL HISTORY OF OTHER VENOUS THROMBOSIS AND EMBOLISM: ICD-10-CM

## 2021-11-11 DIAGNOSIS — Z89.422 ACQUIRED ABSENCE OF OTHER LEFT TOE(S): ICD-10-CM

## 2021-11-11 DIAGNOSIS — E11.621 TYPE 2 DIABETES MELLITUS WITH FOOT ULCER: ICD-10-CM

## 2021-11-11 DIAGNOSIS — Z86.16 PERSONAL HISTORY OF COVID-19: ICD-10-CM

## 2021-11-11 DIAGNOSIS — G47.33 OBSTRUCTIVE SLEEP APNEA (ADULT) (PEDIATRIC): ICD-10-CM

## 2021-11-11 DIAGNOSIS — L84 CORNS AND CALLOSITIES: ICD-10-CM

## 2021-11-11 DIAGNOSIS — Z89.421 ACQUIRED ABSENCE OF OTHER RIGHT TOE(S): Chronic | ICD-10-CM

## 2021-11-11 DIAGNOSIS — L97.412 NON-PRESSURE CHRONIC ULCER OF RIGHT HEEL AND MIDFOOT WITH FAT LAYER EXPOSED: ICD-10-CM

## 2021-11-11 DIAGNOSIS — Z80.3 FAMILY HISTORY OF MALIGNANT NEOPLASM OF BREAST: ICD-10-CM

## 2021-11-11 DIAGNOSIS — E66.01 MORBID (SEVERE) OBESITY DUE TO EXCESS CALORIES: ICD-10-CM

## 2021-11-11 DIAGNOSIS — E78.5 HYPERLIPIDEMIA, UNSPECIFIED: ICD-10-CM

## 2021-11-11 DIAGNOSIS — Z87.81 PERSONAL HISTORY OF (HEALED) TRAUMATIC FRACTURE: ICD-10-CM

## 2021-11-11 DIAGNOSIS — E11.610 TYPE 2 DIABETES MELLITUS WITH DIABETIC NEUROPATHIC ARTHROPATHY: ICD-10-CM

## 2021-11-11 DIAGNOSIS — Z85.828 PERSONAL HISTORY OF OTHER MALIGNANT NEOPLASM OF SKIN: ICD-10-CM

## 2021-11-11 DIAGNOSIS — Z89.421 ACQUIRED ABSENCE OF OTHER RIGHT TOE(S): ICD-10-CM

## 2021-11-11 DIAGNOSIS — Z94.0 KIDNEY TRANSPLANT STATUS: ICD-10-CM

## 2021-11-11 DIAGNOSIS — Z79.899 OTHER LONG TERM (CURRENT) DRUG THERAPY: ICD-10-CM

## 2021-11-11 DIAGNOSIS — Z79.82 LONG TERM (CURRENT) USE OF ASPIRIN: ICD-10-CM

## 2021-11-11 PROCEDURE — G0463: CPT

## 2021-11-11 PROCEDURE — 99213 OFFICE O/P EST LOW 20 MIN: CPT

## 2021-11-11 NOTE — PHYSICAL EXAM
[4 x 4] : 4 x 4  [Abdominal Pad] : Abdominal Pad [0] : left 0 [1+] : left 1+ [Varicose Veins Of Lower Extremities] : not present [] : not present [Purpura] : no purpura  [Petechiae] : no petechiae [Skin Ulcer] : ulcer [Skin Induration] : no induration [Alert] : alert [Oriented to Person] : oriented to person [Oriented to Place] : oriented to place [Oriented to Time] : oriented to time [Calm] : calm [de-identified] : adult WM, NAD, alert, Ox 3. [de-identified] : Diabetic charcot foot  [de-identified] : right foot plantar ulcer down to skin, subcutaneous tissue, fat. [de-identified] : Diabetic neuropathy  [FreeTextEntry1] : Plantar foot  [FreeTextEntry2] : 1.0 [FreeTextEntry3] : 1.4 [FreeTextEntry4] : 0.2 [de-identified] : Serous/sanguinous [de-identified] : Callus  [de-identified] : Expressed comfort post ACE application. [de-identified] : Silver Alginate [de-identified] : Cleansed with Normal saline\par  [FreeTextEntry7] : Foot lateral 5th Met  [FreeTextEntry8] : 0.9 [FreeTextEntry9] : 0.5 [de-identified] : 0.2 [de-identified] : Serous/sanguinous [de-identified] : Callus  [de-identified] : Expressed comfort post ACE application. [de-identified] : Silver Alginate [de-identified] : Cleansed with Normal saline\par  [de-identified] : Lateral Foot  [de-identified] : 4.5 [de-identified] : 2.4 [de-identified] : 0.2 [de-identified] : Serous/sanguinous [de-identified] : Expressed comfort post ACE application. [de-identified] : Callus  [de-identified] : Silver Alginate [de-identified] : Cleansed with Normal saline [TWNoteComboBox1] : Right [TWNoteComboBox4] : Small [de-identified] : Erythema [de-identified] : None [de-identified] : None [de-identified] : 100% [de-identified] : No [TWNoteComboBox7] : False [de-identified] : Ace wraps [de-identified] : 3x Weekly [de-identified] : Primary Dressing [TWNoteComboBox9] : Right [de-identified] : Small [de-identified] : other [de-identified] : None [de-identified] : None [de-identified] : >75% [de-identified] : No [TWNoteComboBox8] : False [de-identified] : Ace wraps [de-identified] : 3x Weekly [de-identified] : Right [de-identified] : Primary Dressing [de-identified] : Small [de-identified] : other [de-identified] : None [de-identified] : None [de-identified] : >75% [de-identified] : No [de-identified] : Luzmaria [de-identified] : Ace wraps [de-identified] : 3x Weekly [de-identified] : Primary Dressing

## 2021-11-11 NOTE — VITALS
[Pain related to present condition?] : The patient's  pain is not related to present condition. [] : No [de-identified] : 0

## 2021-11-11 NOTE — ASSESSMENT
[Verbal] : Verbal [Written] : Written [Patient] : Patient [Family member] : Family member [Fair - mild discomfort, physical impairment, low acceptance] : Fair - mild discomfort, physical impairment, low acceptance [Needs reinforcement] : needs reinforcement [Dressing changes] : dressing changes [Foot Care] : foot care [Skin Care] : skin care [Signs and symptoms of infection] : sign and symptoms of infection [Venous Disease] : venous disease [Nutrition] : nutrition [How and When to Call] : how and when to call [Labs and Tests] : labs and tests [Pain Management] : pain management [Off-loading] : off-loading [Patient responsibility to plan of care] : patient responsibility to plan of care [Glycemic Control] : glycemic control [Stable] : stable [Home] : Home [Ambulatory] : Ambulatory [Not Applicable - Long Term Care/Home Health Agency] : Long Term Care/Home Health Agency: Not Applicable [] : Yes [FreeTextEntry2] : Infection prevention\par Localized wound care \par Goal of remaining pain free regarding wounds.\par Offloading \par Promote optimal nutrition \par Vascular studies\par Vascular consult\par F/U in 1 week for an assessment and on 11/22/21@ 8am for a vascular consult [FreeTextEntry4] : Patient received diabetic orthotics from IMS consultant Gianluca today\par DPM ordered vascular studies and a vascular consult\par Authorization for the vascular studies submitted today\par F/U in 1 week for an assessment and on 11/22/21@ 8am for a vascular consult

## 2021-11-11 NOTE — HISTORY OF PRESENT ILLNESS
[FreeTextEntry1] : DFU 2  (3)plantar lateral mid foot , clean , stable , peripheral callus formation , diabetic Charcot foot a contributing factor in conjunction with advancing diabetic small vessel disease .

## 2021-11-11 NOTE — REVIEW OF SYSTEMS
[Fever] : no fever [Chills] : no chills [Eye Pain] : no eye pain [Loss Of Hearing] : no hearing loss [Shortness Of Breath] : no shortness of breath [Abdominal Pain] : no abdominal pain [Vomiting] : no vomiting [Skin Lesions] : no skin lesions [Skin Wound] : no skin wound [Anxiety] : no anxiety [Easy Bleeding] : no tendency for easy bleeding [FreeTextEntry5] : HTN,HLD [FreeTextEntry7] : 40.9 BMI , morbid obesity  [FreeTextEntry8] : Kidney Transplant  [FreeTextEntry9] : Associated Diabetic Charcot foot  [de-identified] : plantar right foot midfoot , lateral  DFU 2 , 3 separate ulcers , large area of peripheral callus build up  [de-identified] : IDDM with neuropathy  [de-identified] : MARILUZ

## 2021-11-17 ENCOUNTER — OUTPATIENT (OUTPATIENT)
Dept: OUTPATIENT SERVICES | Facility: HOSPITAL | Age: 56
LOS: 1 days | End: 2021-11-17
Payer: COMMERCIAL

## 2021-11-17 ENCOUNTER — RESULT REVIEW (OUTPATIENT)
Age: 56
End: 2021-11-17

## 2021-11-17 ENCOUNTER — OUTPATIENT (OUTPATIENT)
Dept: OUTPATIENT SERVICES | Facility: HOSPITAL | Age: 56
LOS: 1 days | Discharge: HOME | End: 2021-11-17
Payer: MEDICARE

## 2021-11-17 ENCOUNTER — APPOINTMENT (OUTPATIENT)
Dept: WOUND CARE | Facility: HOSPITAL | Age: 56
End: 2021-11-17
Payer: MEDICARE

## 2021-11-17 VITALS
RESPIRATION RATE: 18 BRPM | OXYGEN SATURATION: 95 % | HEIGHT: 73 IN | SYSTOLIC BLOOD PRESSURE: 142 MMHG | BODY MASS INDEX: 41.08 KG/M2 | DIASTOLIC BLOOD PRESSURE: 72 MMHG | TEMPERATURE: 98.8 F | WEIGHT: 310 LBS | HEART RATE: 70 BPM

## 2021-11-17 DIAGNOSIS — Z89.421 ACQUIRED ABSENCE OF OTHER RIGHT TOE(S): Chronic | ICD-10-CM

## 2021-11-17 DIAGNOSIS — N18.6 END STAGE RENAL DISEASE: Chronic | ICD-10-CM

## 2021-11-17 DIAGNOSIS — Z94.0 KIDNEY TRANSPLANT STATUS: Chronic | ICD-10-CM

## 2021-11-17 DIAGNOSIS — E11.621 TYPE 2 DIABETES MELLITUS WITH FOOT ULCER: ICD-10-CM

## 2021-11-17 DIAGNOSIS — L97.512 NON-PRESSURE CHRONIC ULCER OF OTHER PART OF RIGHT FOOT WITH FAT LAYER EXPOSED: ICD-10-CM

## 2021-11-17 PROCEDURE — 11042 DBRDMT SUBQ TIS 1ST 20SQCM/<: CPT

## 2021-11-17 PROCEDURE — 93923 UPR/LXTR ART STDY 3+ LVLS: CPT

## 2021-11-17 PROCEDURE — 93923 UPR/LXTR ART STDY 3+ LVLS: CPT | Mod: 26

## 2021-11-17 NOTE — REVIEW OF SYSTEMS
[FreeTextEntry1] : 11:30 [Fever] : no fever [Chills] : no chills [Eye Pain] : no eye pain [Loss Of Hearing] : no hearing loss [Shortness Of Breath] : no shortness of breath [Abdominal Pain] : no abdominal pain [Vomiting] : no vomiting [Skin Lesions] : no skin lesions [Skin Wound] : no skin wound [Anxiety] : no anxiety [Easy Bleeding] : no tendency for easy bleeding [FreeTextEntry5] : HTN,HLD [FreeTextEntry7] : 40.9 BMI , morbid obesity  [FreeTextEntry8] : Kidney Transplant  [FreeTextEntry9] : Associated Diabetic Charcot foot  [de-identified] : plantar right foot midfoot , lateral  DFU 2 , 3 separate ulcers , large area of peripheral callus build up  [de-identified] : IDDM with neuropathy  [de-identified] : MARILUZ

## 2021-11-17 NOTE — PROCEDURE
[Saline] : saline [Fibrotic] : fibrotic [Slough] : slough [Necrotic] : necrotic [Scalpel] : scalpel [Sharp scissors] : sharp scissors [Skin] : skin [Fat] : fat [Subcutaneous tissue] : subcutaneous tissue [Clean] : clean [Pressure] : pressure [AgNo3 Cauterization] : AgNo3 cauterization [FreeTextEntry2] : DFU plantar right  [FreeTextEntry1] : silver alginate  [] : Yes [FreeTextEntry9] : 11:55 [de-identified] : DFU 2 plantar right foot  [de-identified] : Teodora- [FreeTextEntry6] : DFU 2 plantar right foot  [FreeTextEntry7] : same [de-identified] : 0 [de-identified] : 8cc [de-identified] : necrotic skin, subcutaneous , fat

## 2021-11-17 NOTE — PROCEDURE
[Saline] : saline [Fibrotic] : fibrotic [Slough] : slough [Necrotic] : necrotic [Scalpel] : scalpel [Sharp scissors] : sharp scissors [Skin] : skin [Fat] : fat [Subcutaneous tissue] : subcutaneous tissue [Clean] : clean [Pressure] : pressure [AgNo3 Cauterization] : AgNo3 cauterization [FreeTextEntry2] : DFU plantar right  [FreeTextEntry1] : silver alginate  [] : Yes [FreeTextEntry9] : 11:55 [de-identified] : DFU 2 plantar right foot  [de-identified] : Teodora- [FreeTextEntry6] : DFU 2 plantar right foot  [FreeTextEntry7] : same [de-identified] : 0 [de-identified] : 8cc [de-identified] : necrotic skin, subcutaneous , fat

## 2021-11-17 NOTE — REVIEW OF SYSTEMS
[FreeTextEntry1] : 11:30 [Fever] : no fever [Chills] : no chills [Eye Pain] : no eye pain [Loss Of Hearing] : no hearing loss [Shortness Of Breath] : no shortness of breath [Abdominal Pain] : no abdominal pain [Vomiting] : no vomiting [Skin Lesions] : no skin lesions [Skin Wound] : no skin wound [Anxiety] : no anxiety [Easy Bleeding] : no tendency for easy bleeding [FreeTextEntry7] : 40.9 BMI , morbid obesity  [FreeTextEntry5] : HTN,HLD [FreeTextEntry8] : Kidney Transplant  [FreeTextEntry9] : Associated Diabetic Charcot foot  [de-identified] : plantar right foot midfoot , lateral  DFU 2 , 3 separate ulcers , large area of peripheral callus build up  [de-identified] : IDDM with neuropathy  [de-identified] : MARILUZ

## 2021-11-18 DIAGNOSIS — Z89.422 ACQUIRED ABSENCE OF OTHER LEFT TOE(S): ICD-10-CM

## 2021-11-18 DIAGNOSIS — Z80.8 FAMILY HISTORY OF MALIGNANT NEOPLASM OF OTHER ORGANS OR SYSTEMS: ICD-10-CM

## 2021-11-18 DIAGNOSIS — E11.621 TYPE 2 DIABETES MELLITUS WITH FOOT ULCER: ICD-10-CM

## 2021-11-18 DIAGNOSIS — L84 CORNS AND CALLOSITIES: ICD-10-CM

## 2021-11-18 DIAGNOSIS — Z87.81 PERSONAL HISTORY OF (HEALED) TRAUMATIC FRACTURE: ICD-10-CM

## 2021-11-18 DIAGNOSIS — Z79.899 OTHER LONG TERM (CURRENT) DRUG THERAPY: ICD-10-CM

## 2021-11-18 DIAGNOSIS — Z85.828 PERSONAL HISTORY OF OTHER MALIGNANT NEOPLASM OF SKIN: ICD-10-CM

## 2021-11-18 DIAGNOSIS — Z79.4 LONG TERM (CURRENT) USE OF INSULIN: ICD-10-CM

## 2021-11-18 DIAGNOSIS — E78.5 HYPERLIPIDEMIA, UNSPECIFIED: ICD-10-CM

## 2021-11-18 DIAGNOSIS — Z83.3 FAMILY HISTORY OF DIABETES MELLITUS: ICD-10-CM

## 2021-11-18 DIAGNOSIS — Z80.3 FAMILY HISTORY OF MALIGNANT NEOPLASM OF BREAST: ICD-10-CM

## 2021-11-18 DIAGNOSIS — Z94.0 KIDNEY TRANSPLANT STATUS: ICD-10-CM

## 2021-11-18 DIAGNOSIS — L97.412 NON-PRESSURE CHRONIC ULCER OF RIGHT HEEL AND MIDFOOT WITH FAT LAYER EXPOSED: ICD-10-CM

## 2021-11-18 DIAGNOSIS — Z79.82 LONG TERM (CURRENT) USE OF ASPIRIN: ICD-10-CM

## 2021-11-18 DIAGNOSIS — Z86.718 PERSONAL HISTORY OF OTHER VENOUS THROMBOSIS AND EMBOLISM: ICD-10-CM

## 2021-11-18 DIAGNOSIS — Z89.421 ACQUIRED ABSENCE OF OTHER RIGHT TOE(S): ICD-10-CM

## 2021-11-18 DIAGNOSIS — Z86.73 PERSONAL HISTORY OF TRANSIENT ISCHEMIC ATTACK (TIA), AND CEREBRAL INFARCTION WITHOUT RESIDUAL DEFICITS: ICD-10-CM

## 2021-11-18 DIAGNOSIS — E11.610 TYPE 2 DIABETES MELLITUS WITH DIABETIC NEUROPATHIC ARTHROPATHY: ICD-10-CM

## 2021-11-18 DIAGNOSIS — G47.33 OBSTRUCTIVE SLEEP APNEA (ADULT) (PEDIATRIC): ICD-10-CM

## 2021-11-18 DIAGNOSIS — Z86.16 PERSONAL HISTORY OF COVID-19: ICD-10-CM

## 2021-11-18 DIAGNOSIS — E66.01 MORBID (SEVERE) OBESITY DUE TO EXCESS CALORIES: ICD-10-CM

## 2021-11-19 ENCOUNTER — NON-APPOINTMENT (OUTPATIENT)
Age: 56
End: 2021-11-19

## 2021-11-22 ENCOUNTER — OUTPATIENT (OUTPATIENT)
Dept: OUTPATIENT SERVICES | Facility: HOSPITAL | Age: 56
LOS: 1 days | Discharge: HOME | End: 2021-11-22
Payer: MEDICARE

## 2021-11-22 ENCOUNTER — APPOINTMENT (OUTPATIENT)
Dept: WOUND CARE | Facility: HOSPITAL | Age: 56
End: 2021-11-22
Payer: MEDICARE

## 2021-11-22 VITALS
WEIGHT: 310 LBS | DIASTOLIC BLOOD PRESSURE: 56 MMHG | RESPIRATION RATE: 18 BRPM | OXYGEN SATURATION: 96 % | HEART RATE: 63 BPM | HEIGHT: 73 IN | TEMPERATURE: 98.5 F | BODY MASS INDEX: 41.08 KG/M2 | SYSTOLIC BLOOD PRESSURE: 124 MMHG

## 2021-11-22 DIAGNOSIS — N18.6 END STAGE RENAL DISEASE: Chronic | ICD-10-CM

## 2021-11-22 DIAGNOSIS — Z89.421 ACQUIRED ABSENCE OF OTHER RIGHT TOE(S): Chronic | ICD-10-CM

## 2021-11-22 DIAGNOSIS — Z94.0 KIDNEY TRANSPLANT STATUS: Chronic | ICD-10-CM

## 2021-11-22 DIAGNOSIS — E11.621 TYPE 2 DIABETES MELLITUS WITH FOOT ULCER: ICD-10-CM

## 2021-11-22 PROCEDURE — G0463: CPT

## 2021-11-22 PROCEDURE — 99214 OFFICE O/P EST MOD 30 MIN: CPT

## 2021-11-22 NOTE — HISTORY OF PRESENT ILLNESS
[FreeTextEntry1] : 54 yo M with hx of DM. Remote hx of R foot surgery with hardware -eventual removal. Small residual segment still in place. Hx of renal transplant on Tacrolimus. Recent admission to the hospital with severe RLE cellulitis. Dr Araiza has been treating R foot callous. He wears an orthotic shoe.

## 2021-11-22 NOTE — PHYSICAL EXAM
[2+] : left 2+ [Ankle Swelling (On Exam)] : present [Ankle Swelling On The Right] : of the right ankle [Ankle Swelling On The Left] : moderate [Varicose Veins Of Lower Extremities] : not present [] : not present [FreeTextEntry1] : R plantar arch strong biphasic signal [de-identified] : R plantar surface superficial callous, maceration,  pink base

## 2021-11-22 NOTE — ASSESSMENT
[FreeTextEntry1] : 54 yo M with chronic callous at pressure point. No evidence of PVD. No osteo on hospital MRI.

## 2021-11-23 DIAGNOSIS — Z98.890 OTHER SPECIFIED POSTPROCEDURAL STATES: ICD-10-CM

## 2021-11-23 DIAGNOSIS — Z83.3 FAMILY HISTORY OF DIABETES MELLITUS: ICD-10-CM

## 2021-11-23 DIAGNOSIS — G47.33 OBSTRUCTIVE SLEEP APNEA (ADULT) (PEDIATRIC): ICD-10-CM

## 2021-11-23 DIAGNOSIS — Z80.8 FAMILY HISTORY OF MALIGNANT NEOPLASM OF OTHER ORGANS OR SYSTEMS: ICD-10-CM

## 2021-11-23 DIAGNOSIS — Z80.3 FAMILY HISTORY OF MALIGNANT NEOPLASM OF BREAST: ICD-10-CM

## 2021-11-23 DIAGNOSIS — Z79.899 OTHER LONG TERM (CURRENT) DRUG THERAPY: ICD-10-CM

## 2021-11-23 DIAGNOSIS — Z85.828 PERSONAL HISTORY OF OTHER MALIGNANT NEOPLASM OF SKIN: ICD-10-CM

## 2021-11-23 DIAGNOSIS — L97.412 NON-PRESSURE CHRONIC ULCER OF RIGHT HEEL AND MIDFOOT WITH FAT LAYER EXPOSED: ICD-10-CM

## 2021-11-23 DIAGNOSIS — L84 CORNS AND CALLOSITIES: ICD-10-CM

## 2021-11-23 DIAGNOSIS — Z87.81 PERSONAL HISTORY OF (HEALED) TRAUMATIC FRACTURE: ICD-10-CM

## 2021-11-23 DIAGNOSIS — Z94.0 KIDNEY TRANSPLANT STATUS: ICD-10-CM

## 2021-11-23 DIAGNOSIS — E11.621 TYPE 2 DIABETES MELLITUS WITH FOOT ULCER: ICD-10-CM

## 2021-11-23 DIAGNOSIS — Z79.84 LONG TERM (CURRENT) USE OF ORAL HYPOGLYCEMIC DRUGS: ICD-10-CM

## 2021-11-26 ENCOUNTER — APPOINTMENT (OUTPATIENT)
Dept: WOUND CARE | Facility: HOSPITAL | Age: 56
End: 2021-11-26
Payer: MEDICARE

## 2021-11-26 ENCOUNTER — NON-APPOINTMENT (OUTPATIENT)
Age: 56
End: 2021-11-26

## 2021-11-26 ENCOUNTER — OUTPATIENT (OUTPATIENT)
Dept: OUTPATIENT SERVICES | Facility: HOSPITAL | Age: 56
LOS: 1 days | Discharge: HOME | End: 2021-11-26
Payer: MEDICARE

## 2021-11-26 VITALS
OXYGEN SATURATION: 97 % | HEIGHT: 73 IN | DIASTOLIC BLOOD PRESSURE: 62 MMHG | WEIGHT: 310 LBS | TEMPERATURE: 97.2 F | SYSTOLIC BLOOD PRESSURE: 146 MMHG | RESPIRATION RATE: 20 BRPM | BODY MASS INDEX: 41.08 KG/M2 | HEART RATE: 69 BPM

## 2021-11-26 DIAGNOSIS — Z89.421 ACQUIRED ABSENCE OF OTHER RIGHT TOE(S): Chronic | ICD-10-CM

## 2021-11-26 DIAGNOSIS — E11.621 TYPE 2 DIABETES MELLITUS WITH FOOT ULCER: ICD-10-CM

## 2021-11-26 DIAGNOSIS — N18.6 END STAGE RENAL DISEASE: Chronic | ICD-10-CM

## 2021-11-26 DIAGNOSIS — Z94.0 KIDNEY TRANSPLANT STATUS: Chronic | ICD-10-CM

## 2021-11-26 DIAGNOSIS — S81.801A UNSPECIFIED OPEN WOUND, RIGHT LOWER LEG, INITIAL ENCOUNTER: ICD-10-CM

## 2021-11-26 PROCEDURE — G0463: CPT

## 2021-11-26 PROCEDURE — 99213 OFFICE O/P EST LOW 20 MIN: CPT

## 2021-11-26 NOTE — HISTORY OF PRESENT ILLNESS
[FreeTextEntry1] : 54y/o man with PMH of HTN, DM2, history or renal failure s/p renal transplant on immunosuppressive meds and prophylactic bactrim, and RLE toe amputation for OM is in wound center with R foot ulcer. He had R foot surgery in the past with hardware s/p removal with small residual segment in place. He had a small cut in R foot that caused R lower leg cellulitis for which was admitted at \[facility]\"" on 9/19 to 9/22 and treated with ceftriaxone and discharged on vantin orally. \par Today he is for wound check, R foot with an open wound on R lateral mid foot in plantar side, with superficial ulcer. No fever, no discharge, no warmth, swelling or tenderness. \par ID was called to comment on culture sent on 11/5 growing CoNS and Enterococcus. \par \par

## 2021-11-26 NOTE — ASSESSMENT
[FreeTextEntry1] : At this point I don't see any sign of wound infeciton or cellulitis, CoNS and Enterococcus are seen frequently on skin fady. I wouldn't start any antibiotics unless there is any sign of infection, cellulitis or purulent discharge. \par Good glucose control \par WOund care\par seen by vascular , will follow with podiatry and vascualr \par Go to ED if any swelling , erythema or pain or discharge since he is immunosuppressed. \par \par Patient was given the opportunity to ask questions and all questions were answered to their satisfaction.\par Counseling included lab results, differential diagnosis, treatment options, risks and benefits, lifestyle changes, current condition, medications and dose adjustments.\par The patient verbalized understanding.\par  [Treatment Education] : treatment education [Treatment Adherence] : treatment adherence [Risk Reduction] : risk reduction [Universal Precautions] : universal precautions [Anticipatory Guidance] : anticipatory guidance What Is The Reason For Today's Visit?: History of Non-Melanoma Skin Cancer How Many Skin Cancers Have You Had?: one When Was Your Last Cancer Diagnosed?: 2014

## 2021-11-26 NOTE — PHYSICAL EXAM
[FreeTextEntry1] : R foot in lateral mid plantar side with full skin thickness wound on a callous formed area, with some extra dead skin on top, about 5cm

## 2021-11-27 NOTE — ASSESSMENT
[Verbal] : Verbal [Written] : Written [Patient] : Patient [Family member] : Family member [Fair - mild discomfort, physical impairment, low acceptance] : Fair - mild discomfort, physical impairment, low acceptance [Needs reinforcement] : needs reinforcement [Dressing changes] : dressing changes [Foot Care] : foot care [Skin Care] : skin care [Signs and symptoms of infection] : sign and symptoms of infection [Venous Disease] : venous disease [Nutrition] : nutrition [How and When to Call] : how and when to call [Labs and Tests] : labs and tests [Pain Management] : pain management [Off-loading] : off-loading [Patient responsibility to plan of care] : patient responsibility to plan of care [Glycemic Control] : glycemic control [Stable] : stable [Home] : Home [Ambulatory] : Ambulatory [] : No [FreeTextEntry2] : Infection prevention\par Localized wound care \par Goal of remaining pain free regarding wounds.\par Offloading \par Promote optimal nutrition \par Vascular studies\par Vascular consult\par F/U in 1 week for an assessment and on 11/22/21@ 8am for a vascular consult [FreeTextEntry4] : Pt was seen by Dr. Torres at today's visit to [facility].\par DPM requested Pt see Gianluca([facility]) at next visit to [facility]. Paperwork submitted\par Pt to F/U to [facility] in 1 Week

## 2021-11-27 NOTE — PHYSICAL EXAM
[4 x 4] : 4 x 4  [Abdominal Pad] : Abdominal Pad [0] : left 0 [1+] : left 1+ [Varicose Veins Of Lower Extremities] : not present [] : not present [Purpura] : no purpura  [Petechiae] : no petechiae [Skin Ulcer] : ulcer [Skin Induration] : no induration [Alert] : alert [Oriented to Person] : oriented to person [Oriented to Place] : oriented to place [Oriented to Time] : oriented to time [Calm] : calm [de-identified] : adult WM, NAD, alert, Ox 3. [de-identified] : HTN, HLD [de-identified] : Diabetic charcot foot  [de-identified] : right foot plantar ulcer down to skin, subcutaneous tissue, fat. [de-identified] : Diabetic neuropathy  [FreeTextEntry1] : Plantar Foot  [FreeTextEntry2] : 4.9 [FreeTextEntry3] : 3.0 [FreeTextEntry4] : 0.2 [de-identified] : serosanguineous [de-identified] : Callus & mild Maceration   [de-identified] : Expressed comfort post ACE application. [de-identified] : Silver Alginate [de-identified] : Cleansed with Normal saline\par  [FreeTextEntry7] : Foot lateral 5th Met- Resolved [de-identified] : Intact  [de-identified] : Expressed comfort post ACE application. [de-identified] : No Treatment  [de-identified] : Cleansed with Normal saline\par  [de-identified] : Same day emergency debridement performed by KYLE [de-identified] : Lateral Foot  [de-identified] : Callus  [de-identified] : Expressed comfort post ACE application. [de-identified] : No Treatment  [de-identified] : Cleansed with Normal saline [TWNoteComboBox1] : Right [TWNoteComboBox4] : Moderate [de-identified] : Erythema [de-identified] : None [de-identified] : None [de-identified] : 100% [de-identified] : No [de-identified] : Ace wraps [de-identified] : 3x Weekly [de-identified] : False [TWNoteComboBox9] : Right [de-identified] : Small [de-identified] : other [de-identified] : None [de-identified] : None [de-identified] : >75% [de-identified] : No [de-identified] : Ace wraps [de-identified] : 3x Weekly [de-identified] : Primary Dressing [de-identified] : Right [de-identified] : Small [de-identified] : other [de-identified] : None [de-identified] : None [de-identified] : >75% [de-identified] : No [de-identified] : Luzmaria [de-identified] : Ace wraps [de-identified] : 3x Weekly

## 2021-11-27 NOTE — REVIEW OF SYSTEMS
[Fever] : no fever [Chills] : no chills [Eye Pain] : no eye pain [Loss Of Hearing] : no hearing loss [Shortness Of Breath] : no shortness of breath [Abdominal Pain] : no abdominal pain [Vomiting] : no vomiting [Skin Lesions] : no skin lesions [Skin Wound] : no skin wound [Anxiety] : no anxiety [Easy Bleeding] : no tendency for easy bleeding [FreeTextEntry5] : HTN,HLD [FreeTextEntry7] : 40.9 BMI , morbid obesity  [FreeTextEntry8] : Kidney Transplant  [FreeTextEntry9] : Associated Diabetic Charcot foot  [de-identified] : plantar right foot midfoot , lateral  DFU 2 , 3 separate ulcers , large area of peripheral callus build up  [de-identified] : IDDM with neuropathy  [de-identified] : MARILUZ

## 2021-11-28 DIAGNOSIS — Z80.3 FAMILY HISTORY OF MALIGNANT NEOPLASM OF BREAST: ICD-10-CM

## 2021-11-28 DIAGNOSIS — Z79.4 LONG TERM (CURRENT) USE OF INSULIN: ICD-10-CM

## 2021-11-28 DIAGNOSIS — Z85.828 PERSONAL HISTORY OF OTHER MALIGNANT NEOPLASM OF SKIN: ICD-10-CM

## 2021-11-28 DIAGNOSIS — E11.621 TYPE 2 DIABETES MELLITUS WITH FOOT ULCER: ICD-10-CM

## 2021-11-28 DIAGNOSIS — Z86.718 PERSONAL HISTORY OF OTHER VENOUS THROMBOSIS AND EMBOLISM: ICD-10-CM

## 2021-11-28 DIAGNOSIS — Z87.81 PERSONAL HISTORY OF (HEALED) TRAUMATIC FRACTURE: ICD-10-CM

## 2021-11-28 DIAGNOSIS — L97.412 NON-PRESSURE CHRONIC ULCER OF RIGHT HEEL AND MIDFOOT WITH FAT LAYER EXPOSED: ICD-10-CM

## 2021-11-28 DIAGNOSIS — Z89.421 ACQUIRED ABSENCE OF OTHER RIGHT TOE(S): ICD-10-CM

## 2021-11-28 DIAGNOSIS — Z94.0 KIDNEY TRANSPLANT STATUS: ICD-10-CM

## 2021-11-28 DIAGNOSIS — Z80.8 FAMILY HISTORY OF MALIGNANT NEOPLASM OF OTHER ORGANS OR SYSTEMS: ICD-10-CM

## 2021-11-28 DIAGNOSIS — Z86.73 PERSONAL HISTORY OF TRANSIENT ISCHEMIC ATTACK (TIA), AND CEREBRAL INFARCTION WITHOUT RESIDUAL DEFICITS: ICD-10-CM

## 2021-11-28 DIAGNOSIS — E78.5 HYPERLIPIDEMIA, UNSPECIFIED: ICD-10-CM

## 2021-11-28 DIAGNOSIS — G47.33 OBSTRUCTIVE SLEEP APNEA (ADULT) (PEDIATRIC): ICD-10-CM

## 2021-11-28 DIAGNOSIS — Z83.3 FAMILY HISTORY OF DIABETES MELLITUS: ICD-10-CM

## 2021-11-28 DIAGNOSIS — Z79.899 OTHER LONG TERM (CURRENT) DRUG THERAPY: ICD-10-CM

## 2021-11-28 DIAGNOSIS — E11.51 TYPE 2 DIABETES MELLITUS WITH DIABETIC PERIPHERAL ANGIOPATHY WITHOUT GANGRENE: ICD-10-CM

## 2021-11-28 DIAGNOSIS — Z89.422 ACQUIRED ABSENCE OF OTHER LEFT TOE(S): ICD-10-CM

## 2021-11-28 DIAGNOSIS — Z86.16 PERSONAL HISTORY OF COVID-19: ICD-10-CM

## 2021-11-28 DIAGNOSIS — E66.01 MORBID (SEVERE) OBESITY DUE TO EXCESS CALORIES: ICD-10-CM

## 2021-11-28 DIAGNOSIS — Z79.82 LONG TERM (CURRENT) USE OF ASPIRIN: ICD-10-CM

## 2021-11-28 DIAGNOSIS — L84 CORNS AND CALLOSITIES: ICD-10-CM

## 2021-11-28 DIAGNOSIS — E11.610 TYPE 2 DIABETES MELLITUS WITH DIABETIC NEUROPATHIC ARTHROPATHY: ICD-10-CM

## 2021-11-30 NOTE — PHYSICAL EXAM
[4 x 4] : 4 x 4  [Abdominal Pad] : Abdominal Pad [0] : left 0 [1+] : left 1+ [Varicose Veins Of Lower Extremities] : not present [] : not present [Purpura] : no purpura  [Petechiae] : no petechiae [Skin Ulcer] : ulcer [Skin Induration] : no induration [Alert] : alert [Oriented to Person] : oriented to person [Oriented to Place] : oriented to place [Oriented to Time] : oriented to time [Calm] : calm [de-identified] : Diabetic charcot foot  [de-identified] : right foot plantar ulcer down to skin, subcutaneous tissue, fat. [de-identified] : Diabetic neuropathy  [de-identified] : Same day emergency debridement performed by KYLE [de-identified] : Post debridement measurements: 6.5 x 3.1 x 0.1-0.2 [TWNoteComboBox7] : Luzmaria [de-identified] : Luzmaria [FreeTextEntry1] : Plantar foot  [FreeTextEntry2] : 1.0 [FreeTextEntry3] : 1.4 [FreeTextEntry4] : 0.2 [de-identified] : serosanguineous [de-identified] : Callus  [de-identified] : Expressed comfort post ACE application. [de-identified] : Silver Alginate [de-identified] : Cleansed with Normal saline\par  [FreeTextEntry7] : Foot lateral 5th Met  [FreeTextEntry8] : 0.9 [FreeTextEntry9] : 0.5 [de-identified] : 0.2 [de-identified] : serosanguineous [de-identified] : Callus  [de-identified] : Expressed comfort post ACE application. [de-identified] : Silver Alginate [de-identified] : Cleansed with Normal saline\par  [de-identified] : Lateral Foot  [de-identified] : 4.5 [de-identified] : 2.4 [de-identified] : 0.2 [de-identified] : serosanguineous [de-identified] : Callus  [de-identified] : Expressed comfort post ACE application. [de-identified] : Silver Alginate [de-identified] : Cleansed with Normal saline [TWNoteComboBox1] : Right [TWNoteComboBox4] : Small [de-identified] : Erythema [de-identified] : None [de-identified] : None [de-identified] : 100% [de-identified] : No [de-identified] : Ace wraps [de-identified] : 3x Weekly [de-identified] : Primary Dressing [TWNoteComboBox9] : Right [de-identified] : Small [de-identified] : other [de-identified] : None [de-identified] : None [de-identified] : >75% [de-identified] : No [de-identified] : Ace wraps [de-identified] : 3x Weekly [de-identified] : Primary Dressing [de-identified] : Right [de-identified] : Small [de-identified] : other [de-identified] : None [de-identified] : None [de-identified] : >75% [de-identified] : No [de-identified] : Ace wraps [de-identified] : 3x Weekly

## 2021-11-30 NOTE — ASSESSMENT
[Verbal] : Verbal [Written] : Written [Patient] : Patient [Family member] : Family member [Fair - mild discomfort, physical impairment, low acceptance] : Fair - mild discomfort, physical impairment, low acceptance [Needs reinforcement] : needs reinforcement [Dressing changes] : dressing changes [Foot Care] : foot care [Skin Care] : skin care [Signs and symptoms of infection] : sign and symptoms of infection [Venous Disease] : venous disease [Nutrition] : nutrition [How and When to Call] : how and when to call [Labs and Tests] : labs and tests [Pain Management] : pain management [Off-loading] : off-loading [Patient responsibility to plan of care] : patient responsibility to plan of care [Glycemic Control] : glycemic control [] : Yes [Stable] : stable [Home] : Home [Ambulatory] : Ambulatory [Not Applicable - Long Term Care/Home Health Agency] : Long Term Care/Home Health Agency: Not Applicable [FreeTextEntry2] : Infection prevention\par Localized wound care \par Goal of remaining pain free regarding wounds.\par Offloading \par Promote optimal nutrition \par Vascular studies\par Vascular consult\par F/U in 1 week for an assessment and on 11/22/21@ 8am for a vascular consult [FreeTextEntry4] : See procedure note. DPM performed same day debridement to right lateral foot.

## 2021-11-30 NOTE — PHYSICAL EXAM
[4 x 4] : 4 x 4  [Abdominal Pad] : Abdominal Pad [0] : left 0 [1+] : left 1+ [Varicose Veins Of Lower Extremities] : not present [] : not present [Purpura] : no purpura  [Petechiae] : no petechiae [Skin Ulcer] : ulcer [Skin Induration] : no induration [Alert] : alert [Oriented to Person] : oriented to person [Oriented to Place] : oriented to place [Oriented to Time] : oriented to time [Calm] : calm [de-identified] : Diabetic charcot foot  [de-identified] : right foot plantar ulcer down to skin, subcutaneous tissue, fat. [de-identified] : Diabetic neuropathy  [de-identified] : Same day emergency debridement performed by KYLE [de-identified] : Post debridement measurements: 6.5 x 3.1 x 0.1-0.2 [TWNoteComboBox7] : Luzmaria [de-identified] : Luzmaria [FreeTextEntry1] : Plantar foot  [FreeTextEntry2] : 1.0 [FreeTextEntry3] : 1.4 [FreeTextEntry4] : 0.2 [de-identified] : serosanguineous [de-identified] : Callus  [de-identified] : Expressed comfort post ACE application. [de-identified] : Silver Alginate [de-identified] : Cleansed with Normal saline\par  [FreeTextEntry7] : Foot lateral 5th Met  [FreeTextEntry8] : 0.9 [FreeTextEntry9] : 0.5 [de-identified] : 0.2 [de-identified] : serosanguineous [de-identified] : Callus  [de-identified] : Expressed comfort post ACE application. [de-identified] : Silver Alginate [de-identified] : Cleansed with Normal saline\par  [de-identified] : Lateral Foot  [de-identified] : 4.5 [de-identified] : 2.4 [de-identified] : 0.2 [de-identified] : serosanguineous [de-identified] : Callus  [de-identified] : Expressed comfort post ACE application. [de-identified] : Silver Alginate [de-identified] : Cleansed with Normal saline [TWNoteComboBox1] : Right [TWNoteComboBox4] : Small [de-identified] : Erythema [de-identified] : None [de-identified] : None [de-identified] : 100% [de-identified] : No [de-identified] : Ace wraps [de-identified] : 3x Weekly [de-identified] : Primary Dressing [TWNoteComboBox9] : Right [de-identified] : Small [de-identified] : other [de-identified] : None [de-identified] : None [de-identified] : >75% [de-identified] : No [de-identified] : Ace wraps [de-identified] : 3x Weekly [de-identified] : Primary Dressing [de-identified] : Right [de-identified] : Small [de-identified] : other [de-identified] : None [de-identified] : None [de-identified] : >75% [de-identified] : No [de-identified] : Ace wraps [de-identified] : 3x Weekly

## 2021-12-02 ENCOUNTER — NON-APPOINTMENT (OUTPATIENT)
Age: 56
End: 2021-12-02

## 2021-12-03 ENCOUNTER — APPOINTMENT (OUTPATIENT)
Dept: WOUND CARE | Facility: HOSPITAL | Age: 56
End: 2021-12-03
Payer: MEDICARE

## 2021-12-03 ENCOUNTER — OUTPATIENT (OUTPATIENT)
Dept: OUTPATIENT SERVICES | Facility: HOSPITAL | Age: 56
LOS: 1 days | Discharge: HOME | End: 2021-12-03
Payer: MEDICARE

## 2021-12-03 VITALS
OXYGEN SATURATION: 96 % | WEIGHT: 310 LBS | RESPIRATION RATE: 18 BRPM | TEMPERATURE: 98.2 F | HEART RATE: 66 BPM | HEIGHT: 73 IN | SYSTOLIC BLOOD PRESSURE: 134 MMHG | BODY MASS INDEX: 41.08 KG/M2 | DIASTOLIC BLOOD PRESSURE: 73 MMHG

## 2021-12-03 DIAGNOSIS — Z94.0 KIDNEY TRANSPLANT STATUS: Chronic | ICD-10-CM

## 2021-12-03 DIAGNOSIS — N18.6 END STAGE RENAL DISEASE: Chronic | ICD-10-CM

## 2021-12-03 DIAGNOSIS — E11.621 TYPE 2 DIABETES MELLITUS WITH FOOT ULCER: ICD-10-CM

## 2021-12-03 DIAGNOSIS — Z89.421 ACQUIRED ABSENCE OF OTHER RIGHT TOE(S): Chronic | ICD-10-CM

## 2021-12-03 PROCEDURE — 99213 OFFICE O/P EST LOW 20 MIN: CPT

## 2021-12-03 PROCEDURE — G0463: CPT

## 2021-12-06 DIAGNOSIS — Z83.3 FAMILY HISTORY OF DIABETES MELLITUS: ICD-10-CM

## 2021-12-06 DIAGNOSIS — Z79.4 LONG TERM (CURRENT) USE OF INSULIN: ICD-10-CM

## 2021-12-06 DIAGNOSIS — Z79.899 OTHER LONG TERM (CURRENT) DRUG THERAPY: ICD-10-CM

## 2021-12-06 DIAGNOSIS — Z86.16 PERSONAL HISTORY OF COVID-19: ICD-10-CM

## 2021-12-06 DIAGNOSIS — Z86.718 PERSONAL HISTORY OF OTHER VENOUS THROMBOSIS AND EMBOLISM: ICD-10-CM

## 2021-12-06 DIAGNOSIS — Z80.3 FAMILY HISTORY OF MALIGNANT NEOPLASM OF BREAST: ICD-10-CM

## 2021-12-06 DIAGNOSIS — L97.412 NON-PRESSURE CHRONIC ULCER OF RIGHT HEEL AND MIDFOOT WITH FAT LAYER EXPOSED: ICD-10-CM

## 2021-12-06 DIAGNOSIS — E11.610 TYPE 2 DIABETES MELLITUS WITH DIABETIC NEUROPATHIC ARTHROPATHY: ICD-10-CM

## 2021-12-06 DIAGNOSIS — E11.621 TYPE 2 DIABETES MELLITUS WITH FOOT ULCER: ICD-10-CM

## 2021-12-06 DIAGNOSIS — Z94.0 KIDNEY TRANSPLANT STATUS: ICD-10-CM

## 2021-12-06 DIAGNOSIS — Z85.828 PERSONAL HISTORY OF OTHER MALIGNANT NEOPLASM OF SKIN: ICD-10-CM

## 2021-12-06 DIAGNOSIS — G47.33 OBSTRUCTIVE SLEEP APNEA (ADULT) (PEDIATRIC): ICD-10-CM

## 2021-12-06 DIAGNOSIS — L84 CORNS AND CALLOSITIES: ICD-10-CM

## 2021-12-06 DIAGNOSIS — Z89.421 ACQUIRED ABSENCE OF OTHER RIGHT TOE(S): ICD-10-CM

## 2021-12-06 DIAGNOSIS — E66.01 MORBID (SEVERE) OBESITY DUE TO EXCESS CALORIES: ICD-10-CM

## 2021-12-06 DIAGNOSIS — E78.5 HYPERLIPIDEMIA, UNSPECIFIED: ICD-10-CM

## 2021-12-06 DIAGNOSIS — E11.51 TYPE 2 DIABETES MELLITUS WITH DIABETIC PERIPHERAL ANGIOPATHY WITHOUT GANGRENE: ICD-10-CM

## 2021-12-06 DIAGNOSIS — Z79.82 LONG TERM (CURRENT) USE OF ASPIRIN: ICD-10-CM

## 2021-12-06 DIAGNOSIS — Z87.81 PERSONAL HISTORY OF (HEALED) TRAUMATIC FRACTURE: ICD-10-CM

## 2021-12-06 DIAGNOSIS — Z86.73 PERSONAL HISTORY OF TRANSIENT ISCHEMIC ATTACK (TIA), AND CEREBRAL INFARCTION WITHOUT RESIDUAL DEFICITS: ICD-10-CM

## 2021-12-06 DIAGNOSIS — Z80.8 FAMILY HISTORY OF MALIGNANT NEOPLASM OF OTHER ORGANS OR SYSTEMS: ICD-10-CM

## 2021-12-06 DIAGNOSIS — Z89.422 ACQUIRED ABSENCE OF OTHER LEFT TOE(S): ICD-10-CM

## 2021-12-09 ENCOUNTER — OUTPATIENT (OUTPATIENT)
Dept: OUTPATIENT SERVICES | Facility: HOSPITAL | Age: 56
LOS: 1 days | Discharge: HOME | End: 2021-12-09
Payer: MEDICARE

## 2021-12-09 ENCOUNTER — APPOINTMENT (OUTPATIENT)
Dept: WOUND CARE | Facility: HOSPITAL | Age: 56
End: 2021-12-09
Payer: MEDICARE

## 2021-12-09 VITALS
RESPIRATION RATE: 18 BRPM | TEMPERATURE: 98.7 F | BODY MASS INDEX: 41.08 KG/M2 | OXYGEN SATURATION: 95 % | HEIGHT: 73 IN | DIASTOLIC BLOOD PRESSURE: 64 MMHG | WEIGHT: 310 LBS | SYSTOLIC BLOOD PRESSURE: 123 MMHG | HEART RATE: 62 BPM

## 2021-12-09 DIAGNOSIS — E11.621 TYPE 2 DIABETES MELLITUS WITH FOOT ULCER: ICD-10-CM

## 2021-12-09 DIAGNOSIS — Z89.421 ACQUIRED ABSENCE OF OTHER RIGHT TOE(S): Chronic | ICD-10-CM

## 2021-12-09 DIAGNOSIS — N18.6 END STAGE RENAL DISEASE: Chronic | ICD-10-CM

## 2021-12-09 DIAGNOSIS — Z94.0 KIDNEY TRANSPLANT STATUS: Chronic | ICD-10-CM

## 2021-12-09 PROCEDURE — 99213 OFFICE O/P EST LOW 20 MIN: CPT

## 2021-12-09 PROCEDURE — G0463: CPT

## 2021-12-09 NOTE — PHYSICAL EXAM
[0] : left 0 [1+] : left 1+ [Skin Ulcer] : ulcer [Alert] : alert [Oriented to Person] : oriented to person [Oriented to Place] : oriented to place [Oriented to Time] : oriented to time [Calm] : calm [Varicose Veins Of Lower Extremities] : not present [] : not present [Purpura] : no purpura  [Petechiae] : no petechiae [Skin Induration] : no induration [de-identified] : adult WM, NAD, alert, Ox 3. [de-identified] : HTN, HLD [de-identified] : Diabetic charcot right foot deformity [de-identified] : right foot plantar ulcer down to skin, subcutaneous tissue, fat. [de-identified] : Diabetic neuropathy  [FreeTextEntry1] : Right Plantar Foot (formerly identified as wound #1 & #3) [FreeTextEntry2] : 4.9 [FreeTextEntry3] : 3.0 [FreeTextEntry4] : 0.1 [de-identified] : Moderate Serosanguineous [de-identified] : Intact/ macerated/ callus- callus shaved by Dr Catherine [de-identified] : Silver Alginate/ Dry Dressing & Kerlix [de-identified] : Cleansed with Normal saline\par  [de-identified] : None [de-identified] : None [de-identified] : 100% [de-identified] : No [de-identified] : Ace wraps

## 2021-12-09 NOTE — REVIEW OF SYSTEMS
[Fever] : no fever [Chills] : no chills [Eye Pain] : no eye pain [Loss Of Hearing] : no hearing loss [Shortness Of Breath] : no shortness of breath [Abdominal Pain] : no abdominal pain [Vomiting] : no vomiting [Skin Lesions] : no skin lesions [Skin Wound] : no skin wound [Anxiety] : no anxiety [Easy Bleeding] : no tendency for easy bleeding [FreeTextEntry5] : HTN,HLD [FreeTextEntry7] : 40.9 BMI , morbid obesity  [FreeTextEntry8] : Kidney Transplant  [FreeTextEntry9] : Associated Diabetic Charcot foot  [de-identified] : plantar right foot midfoot , lateral  DFU 2 , 3 separate ulcers , large area of peripheral callus build up  [de-identified] : IDDM with neuropathy  [de-identified] : MARILUZ

## 2021-12-09 NOTE — PLAN
[FreeTextEntry1] : continue off loading and wound care , apligraf ordered to facilitate closure of the wound , PTR  1 week  Spent 20 minutes for patient care and medical decision making.\par

## 2021-12-09 NOTE — ASSESSMENT
[Verbal] : Verbal [Demo] : Demo [Patient] : Patient [Family member] : Family member [Good - alert, interested, motivated] : Good - alert, interested, motivated [Verbalizes knowledge/Understanding] : Verbalizes knowledge/understanding [Dressing changes] : dressing changes [Foot Care] : foot care [Skin Care] : skin care [Pressure relief] : pressure relief [Signs and symptoms of infection] : sign and symptoms of infection [How and When to Call] : how and when to call [Off-loading] : off-loading [Compression Therapy] : compression therapy [Patient responsibility to plan of care] : patient responsibility to plan of care [Stable] : stable [Home] : Home [Ambulatory] : Ambulatory [] : No [FreeTextEntry2] : Alteration in skin integrity- promote optimal skin integrity\par  [FreeTextEntry3] : unchanged [FreeTextEntry4] : Dr Catherine/ Photos taken\par Orthotist ([facility]- Gianluca Graham) contacted during pt visit to C related to DPM recommendation for Jhonatan Walker- Orthotist to contact pt\par F/U to [facility] in 1 week\par

## 2021-12-09 NOTE — PHYSICAL EXAM
[4 x 4] : 4 x 4  [Abdominal Pad] : Abdominal Pad [0] : left 0 [1+] : left 1+ [Varicose Veins Of Lower Extremities] : not present [] : not present [Purpura] : no purpura  [Petechiae] : no petechiae [Skin Ulcer] : ulcer [Skin Induration] : no induration [Alert] : alert [Oriented to Person] : oriented to person [Oriented to Place] : oriented to place [Oriented to Time] : oriented to time [Calm] : calm [de-identified] : adult WM, NAD, alert, Ox 3. [de-identified] : HTN, HLD [de-identified] : Diabetic Charcot right foot deformity [de-identified] : right foot plantar ulcer down to skin, subcutaneous tissue, fat. [de-identified] : Diabetic neuropathy  [FreeTextEntry1] : Right Plantar Foot  [FreeTextEntry2] : 4.0 [FreeTextEntry3] : 3.1 [FreeTextEntry4] : 0.1 - 0.2 [de-identified] : serosanguineous  [de-identified] : callused - DPM shaved callus [de-identified] : None [de-identified] : Alginate Ag [de-identified] : NSC\par DD\par  [TWNoteComboBox4] : Moderate [TWNoteComboBox5] : No [TWNoteComboBox6] : Diabetic [de-identified] : No [de-identified] : other [de-identified] : None [de-identified] : None [de-identified] : 100% [de-identified] : No [de-identified] : 3x Weekly [de-identified] : Primary Dressing

## 2021-12-09 NOTE — ASSESSMENT
[Verbal] : Verbal [Demo] : Demo [Patient] : Patient [Family member] : Family member [Good - alert, interested, motivated] : Good - alert, interested, motivated [Verbalizes knowledge/Understanding] : Verbalizes knowledge/understanding [Dressing changes] : dressing changes [Foot Care] : foot care [Skin Care] : skin care [Pressure relief] : pressure relief [Signs and symptoms of infection] : sign and symptoms of infection [How and When to Call] : how and when to call [Off-loading] : off-loading [Compression Therapy] : compression therapy [Patient responsibility to plan of care] : patient responsibility to plan of care [Stable] : stable [Home] : Home [Ambulatory] : Ambulatory [Not Applicable - Long Term Care/Home Health Agency] : Long Term Care/Home Health Agency: Not Applicable [] : Yes [FreeTextEntry2] : Infection prevention \par Wound care (dressing changes)\par Maintain optimal skin integrity to high pressure areas\par Compression therapy\par Elevation and low sodium compliance.\par weight reduction strategies\par glycemic control [FreeTextEntry4] : Pt reports going to [place] for 10 days starting 12/26/21. Pt educated on risk factors in swimming and going on the sand barefoot. Pt stated that he will not be swimming nor going to the beach and will comply with offloading\par Auth submitted for Apligraf # 1. If approved, to be applied at next visit \par F/U to [facility] in 1 week\par

## 2021-12-09 NOTE — PLAN
[FreeTextEntry1] : Explained at length with patient and wife all of the patient co morbid factors . Patient has been diabetic for over 30 years has had kidney transplant , morbid obesity , diabetic Charcot foot . Patient is advanced immunocompromised state and diabetic pathology is continuing and worsening . Patient and wife understand . Patient high risk for limb loss and life threatening sepsis .Continue wound care , off loading , . peripheral callus shaved with # 15 blade. Patient will need a custom CROW boot of the right foot to control and stabilize the charcot deformity.\par PTR one week  Spent 20 minutes for patient care and medical decision making.\par

## 2021-12-09 NOTE — REVIEW OF SYSTEMS
[Fever] : no fever [Chills] : no chills [Eye Pain] : no eye pain [Loss Of Hearing] : no hearing loss [Shortness Of Breath] : no shortness of breath [Abdominal Pain] : no abdominal pain [Vomiting] : no vomiting [Skin Lesions] : no skin lesions [Skin Wound] : no skin wound [Anxiety] : no anxiety [Easy Bleeding] : no tendency for easy bleeding [FreeTextEntry5] : HTN,HLD [FreeTextEntry7] : 40.9 BMI , morbid obesity  [FreeTextEntry8] : Kidney Transplant  [FreeTextEntry9] : Associated Diabetic Charcot foot  [de-identified] : plantar right foot midfoot , lateral  DFU 2 , 3 separate ulcers , large area of peripheral callus build up  [de-identified] : IDDM with neuropathy  [de-identified] : MARILUZ

## 2021-12-10 DIAGNOSIS — L97.412 NON-PRESSURE CHRONIC ULCER OF RIGHT HEEL AND MIDFOOT WITH FAT LAYER EXPOSED: ICD-10-CM

## 2021-12-10 DIAGNOSIS — Z83.3 FAMILY HISTORY OF DIABETES MELLITUS: ICD-10-CM

## 2021-12-10 DIAGNOSIS — E11.621 TYPE 2 DIABETES MELLITUS WITH FOOT ULCER: ICD-10-CM

## 2021-12-10 DIAGNOSIS — Z79.82 LONG TERM (CURRENT) USE OF ASPIRIN: ICD-10-CM

## 2021-12-10 DIAGNOSIS — Z80.8 FAMILY HISTORY OF MALIGNANT NEOPLASM OF OTHER ORGANS OR SYSTEMS: ICD-10-CM

## 2021-12-10 DIAGNOSIS — Z87.81 PERSONAL HISTORY OF (HEALED) TRAUMATIC FRACTURE: ICD-10-CM

## 2021-12-10 DIAGNOSIS — Z89.422 ACQUIRED ABSENCE OF OTHER LEFT TOE(S): ICD-10-CM

## 2021-12-10 DIAGNOSIS — Z86.718 PERSONAL HISTORY OF OTHER VENOUS THROMBOSIS AND EMBOLISM: ICD-10-CM

## 2021-12-10 DIAGNOSIS — E11.610 TYPE 2 DIABETES MELLITUS WITH DIABETIC NEUROPATHIC ARTHROPATHY: ICD-10-CM

## 2021-12-10 DIAGNOSIS — L84 CORNS AND CALLOSITIES: ICD-10-CM

## 2021-12-10 DIAGNOSIS — E11.51 TYPE 2 DIABETES MELLITUS WITH DIABETIC PERIPHERAL ANGIOPATHY WITHOUT GANGRENE: ICD-10-CM

## 2021-12-10 DIAGNOSIS — Z80.3 FAMILY HISTORY OF MALIGNANT NEOPLASM OF BREAST: ICD-10-CM

## 2021-12-10 DIAGNOSIS — Z86.73 PERSONAL HISTORY OF TRANSIENT ISCHEMIC ATTACK (TIA), AND CEREBRAL INFARCTION WITHOUT RESIDUAL DEFICITS: ICD-10-CM

## 2021-12-10 DIAGNOSIS — Z79.899 OTHER LONG TERM (CURRENT) DRUG THERAPY: ICD-10-CM

## 2021-12-10 DIAGNOSIS — G47.33 OBSTRUCTIVE SLEEP APNEA (ADULT) (PEDIATRIC): ICD-10-CM

## 2021-12-10 DIAGNOSIS — Z86.16 PERSONAL HISTORY OF COVID-19: ICD-10-CM

## 2021-12-10 DIAGNOSIS — Z79.4 LONG TERM (CURRENT) USE OF INSULIN: ICD-10-CM

## 2021-12-10 DIAGNOSIS — E66.01 MORBID (SEVERE) OBESITY DUE TO EXCESS CALORIES: ICD-10-CM

## 2021-12-10 DIAGNOSIS — E78.5 HYPERLIPIDEMIA, UNSPECIFIED: ICD-10-CM

## 2021-12-10 DIAGNOSIS — Z89.421 ACQUIRED ABSENCE OF OTHER RIGHT TOE(S): ICD-10-CM

## 2021-12-10 DIAGNOSIS — Z85.828 PERSONAL HISTORY OF OTHER MALIGNANT NEOPLASM OF SKIN: ICD-10-CM

## 2021-12-10 DIAGNOSIS — Z94.0 KIDNEY TRANSPLANT STATUS: ICD-10-CM

## 2021-12-16 ENCOUNTER — OUTPATIENT (OUTPATIENT)
Dept: OUTPATIENT SERVICES | Facility: HOSPITAL | Age: 56
LOS: 1 days | Discharge: HOME | End: 2021-12-16
Payer: MEDICARE

## 2021-12-16 ENCOUNTER — APPOINTMENT (OUTPATIENT)
Dept: WOUND CARE | Facility: HOSPITAL | Age: 56
End: 2021-12-16
Payer: MEDICARE

## 2021-12-16 VITALS
RESPIRATION RATE: 18 BRPM | OXYGEN SATURATION: 97 % | TEMPERATURE: 97.1 F | HEIGHT: 73 IN | WEIGHT: 310 LBS | BODY MASS INDEX: 41.08 KG/M2 | DIASTOLIC BLOOD PRESSURE: 78 MMHG | HEART RATE: 70 BPM | SYSTOLIC BLOOD PRESSURE: 160 MMHG

## 2021-12-16 DIAGNOSIS — E11.621 TYPE 2 DIABETES MELLITUS WITH FOOT ULCER: ICD-10-CM

## 2021-12-16 DIAGNOSIS — Z89.421 ACQUIRED ABSENCE OF OTHER RIGHT TOE(S): Chronic | ICD-10-CM

## 2021-12-16 DIAGNOSIS — Z94.0 KIDNEY TRANSPLANT STATUS: Chronic | ICD-10-CM

## 2021-12-16 DIAGNOSIS — N18.6 END STAGE RENAL DISEASE: Chronic | ICD-10-CM

## 2021-12-16 PROCEDURE — 15275 SKIN SUB GRAFT FACE/NK/HF/G: CPT

## 2021-12-17 NOTE — PROCEDURE
[Saline] : saline [Scalpel] : scalpel [Sharp curette] : sharp curette [Skin] : skin [Other: ___] : [unfilled] [Fenestrated] : fenestrated [Hydrated with saline] : hydrated with saline [Apligraf] : apligraf [____ % was used] : and [unfilled] % was used [FreeTextEntry1] : alginate  [] : Yes [FreeTextEntry9] : 1006 [de-identified] : DFU 2 plantar right foot [de-identified] : Teodora [FreeTextEntry6] : DFU 2 plantar right foot  [FreeTextEntry7] : same [de-identified] : 0 [de-identified] : 5cc [de-identified] : slough , skin

## 2021-12-17 NOTE — ASSESSMENT
[Verbal] : Verbal [Demo] : Demo [Patient] : Patient [Family member] : Family member [Good - alert, interested, motivated] : Good - alert, interested, motivated [Verbalizes knowledge/Understanding] : Verbalizes knowledge/understanding [Dressing changes] : dressing changes [Foot Care] : foot care [Skin Care] : skin care [Pressure relief] : pressure relief [Signs and symptoms of infection] : sign and symptoms of infection [How and When to Call] : how and when to call [Off-loading] : off-loading [Compression Therapy] : compression therapy [Patient responsibility to plan of care] : patient responsibility to plan of care [] : Yes [Stable] : stable [Home] : Home [Ambulatory] : Ambulatory [Not Applicable - Long Term Care/Home Health Agency] : Long Term Care/Home Health Agency: Not Applicable [FreeTextEntry2] : Skin Graft\par Infection Prevention \par Compression Therapy \par Restore Optimal Skin Integrity  [FreeTextEntry4] : Applied Apligraf #1 at today's visit to [facility]\par Submitted for Authorization for Apligraf # 2, Pt reports going to [place] for 10 days starting 12/26/21, #2 to be applied after return from vacation.\par F/U to [facility] in 1 Week\par

## 2021-12-17 NOTE — PHYSICAL EXAM
[4 x 4] : 4 x 4  [Abdominal Pad] : Abdominal Pad [0] : left 0 [1+] : left 1+ [Varicose Veins Of Lower Extremities] : not present [] : not present [Purpura] : no purpura  [Petechiae] : no petechiae [Skin Ulcer] : ulcer [Skin Induration] : no induration [Alert] : alert [Oriented to Person] : oriented to person [Oriented to Place] : oriented to place [Oriented to Time] : oriented to time [Calm] : calm [de-identified] : adult WM, NAD, alert, Ox 3. [de-identified] : HTN, HLD [de-identified] : Diabetic Charcot right foot deformity [de-identified] : right foot plantar ulcer down to skin, subcutaneous tissue, fat. [de-identified] : Diabetic neuropathy  [FreeTextEntry1] : Right Plantar Foot  [FreeTextEntry2] : 3.7 [FreeTextEntry3] : 3.0 [FreeTextEntry4] : 0.1 [de-identified] : serosanguineous  [de-identified] : callus - DPM shaved callus [de-identified] : None [de-identified] : Apligraf [de-identified] : Apligraf, Adaptic Touch & Calcium Alginate  [de-identified] : Cleansed with Normal Saline. \par \par 1 Unit of Apligraf 44(SqCm)\par Lot#- [id number]\par Item#- [id number]\par Expiration- 12/22/2021\par pH- 6.8-7.3\par \par Reconstituted with Normal Saline \par Lot#- [id number]\par NS Expiration Date- 04/01/2024\par \par Percentage Implanted- 100%\par Percentage Discharged- 0% \par \par Post Debridement Size- 3.8 x 3.1 x 0.2\par \par DPM performed Dressing Change at today's Wound Care visit.  [TWNoteComboBox4] : Moderate [TWNoteComboBox5] : No [TWNoteComboBox6] : Diabetic [de-identified] : No [de-identified] : other [de-identified] : None [de-identified] : None [de-identified] : 100% [de-identified] : No [TWNoteComboBox7] : Luzmaria [de-identified] : Application of skin substitute [de-identified] : Ace wraps [de-identified] : Weekly [de-identified] : False

## 2021-12-17 NOTE — REVIEW OF SYSTEMS
[FreeTextEntry1] : 900 [Fever] : no fever [Chills] : no chills [Eye Pain] : no eye pain [Loss Of Hearing] : no hearing loss [Shortness Of Breath] : no shortness of breath [Abdominal Pain] : no abdominal pain [Vomiting] : no vomiting [Skin Lesions] : no skin lesions [Skin Wound] : no skin wound [Anxiety] : no anxiety [Easy Bleeding] : no tendency for easy bleeding [FreeTextEntry5] : HTN,HLD [FreeTextEntry7] : 40.9 BMI , morbid obesity  [FreeTextEntry8] : Kidney Transplant  [FreeTextEntry9] : Associated Diabetic Charcot foot  [de-identified] : plantar right foot midfoot , lateral  DFU 2 , 3 separate ulcers , large area of peripheral callus build up  [de-identified] : IDDM with neuropathy  [de-identified] : MARILUZ

## 2021-12-17 NOTE — REASON FOR VISIT
[Apligraf] : apligraf [FreeTextEntry5] : Right Plantar Foot [FreeTextEntry4] : DFU 2 plantar right foot , clean granular

## 2021-12-19 DIAGNOSIS — Z94.0 KIDNEY TRANSPLANT STATUS: ICD-10-CM

## 2021-12-19 DIAGNOSIS — L84 CORNS AND CALLOSITIES: ICD-10-CM

## 2021-12-19 DIAGNOSIS — Z86.73 PERSONAL HISTORY OF TRANSIENT ISCHEMIC ATTACK (TIA), AND CEREBRAL INFARCTION WITHOUT RESIDUAL DEFICITS: ICD-10-CM

## 2021-12-19 DIAGNOSIS — Z86.16 PERSONAL HISTORY OF COVID-19: ICD-10-CM

## 2021-12-19 DIAGNOSIS — Z79.899 OTHER LONG TERM (CURRENT) DRUG THERAPY: ICD-10-CM

## 2021-12-19 DIAGNOSIS — Z80.8 FAMILY HISTORY OF MALIGNANT NEOPLASM OF OTHER ORGANS OR SYSTEMS: ICD-10-CM

## 2021-12-19 DIAGNOSIS — L97.412 NON-PRESSURE CHRONIC ULCER OF RIGHT HEEL AND MIDFOOT WITH FAT LAYER EXPOSED: ICD-10-CM

## 2021-12-19 DIAGNOSIS — E78.5 HYPERLIPIDEMIA, UNSPECIFIED: ICD-10-CM

## 2021-12-19 DIAGNOSIS — Z89.421 ACQUIRED ABSENCE OF OTHER RIGHT TOE(S): ICD-10-CM

## 2021-12-19 DIAGNOSIS — Z87.81 PERSONAL HISTORY OF (HEALED) TRAUMATIC FRACTURE: ICD-10-CM

## 2021-12-19 DIAGNOSIS — E11.51 TYPE 2 DIABETES MELLITUS WITH DIABETIC PERIPHERAL ANGIOPATHY WITHOUT GANGRENE: ICD-10-CM

## 2021-12-19 DIAGNOSIS — E11.621 TYPE 2 DIABETES MELLITUS WITH FOOT ULCER: ICD-10-CM

## 2021-12-19 DIAGNOSIS — Z80.3 FAMILY HISTORY OF MALIGNANT NEOPLASM OF BREAST: ICD-10-CM

## 2021-12-19 DIAGNOSIS — Z85.828 PERSONAL HISTORY OF OTHER MALIGNANT NEOPLASM OF SKIN: ICD-10-CM

## 2021-12-19 DIAGNOSIS — Z79.82 LONG TERM (CURRENT) USE OF ASPIRIN: ICD-10-CM

## 2021-12-19 DIAGNOSIS — G47.33 OBSTRUCTIVE SLEEP APNEA (ADULT) (PEDIATRIC): ICD-10-CM

## 2021-12-19 DIAGNOSIS — Z89.422 ACQUIRED ABSENCE OF OTHER LEFT TOE(S): ICD-10-CM

## 2021-12-19 DIAGNOSIS — E11.610 TYPE 2 DIABETES MELLITUS WITH DIABETIC NEUROPATHIC ARTHROPATHY: ICD-10-CM

## 2021-12-19 DIAGNOSIS — Z83.3 FAMILY HISTORY OF DIABETES MELLITUS: ICD-10-CM

## 2021-12-19 DIAGNOSIS — Z86.718 PERSONAL HISTORY OF OTHER VENOUS THROMBOSIS AND EMBOLISM: ICD-10-CM

## 2021-12-19 DIAGNOSIS — Z79.4 LONG TERM (CURRENT) USE OF INSULIN: ICD-10-CM

## 2021-12-19 DIAGNOSIS — E66.01 MORBID (SEVERE) OBESITY DUE TO EXCESS CALORIES: ICD-10-CM

## 2021-12-20 ENCOUNTER — NON-APPOINTMENT (OUTPATIENT)
Age: 56
End: 2021-12-20

## 2021-12-20 ENCOUNTER — APPOINTMENT (OUTPATIENT)
Dept: NEPHROLOGY | Facility: CLINIC | Age: 56
End: 2021-12-20
Payer: MEDICARE

## 2021-12-20 VITALS
TEMPERATURE: 98.5 F | HEART RATE: 74 BPM | SYSTOLIC BLOOD PRESSURE: 134 MMHG | OXYGEN SATURATION: 95 % | BODY MASS INDEX: 41.75 KG/M2 | HEIGHT: 73 IN | RESPIRATION RATE: 18 BRPM | WEIGHT: 315 LBS | DIASTOLIC BLOOD PRESSURE: 67 MMHG

## 2021-12-20 DIAGNOSIS — E55.9 VITAMIN D DEFICIENCY, UNSPECIFIED: ICD-10-CM

## 2021-12-20 LAB
25(OH)D3 SERPL-MCNC: 18.6 NG/ML
ALBUMIN SERPL ELPH-MCNC: 4.1 G/DL
ALP BLD-CCNC: 135 U/L
ALT SERPL-CCNC: 8 U/L
ANION GAP SERPL CALC-SCNC: 11 MMOL/L
APPEARANCE: CLEAR
AST SERPL-CCNC: 10 U/L
BACTERIA: NEGATIVE
BASOPHILS # BLD AUTO: 0.06 K/UL
BASOPHILS NFR BLD AUTO: 0.9 %
BILIRUB SERPL-MCNC: 0.4 MG/DL
BILIRUBIN URINE: NEGATIVE
BLOOD URINE: ABNORMAL
BUN SERPL-MCNC: 20 MG/DL
CALCIUM SERPL-MCNC: 9.6 MG/DL
CHLORIDE SERPL-SCNC: 99 MMOL/L
CHOLEST SERPL-MCNC: 131 MG/DL
CO2 SERPL-SCNC: 31 MMOL/L
COLOR: YELLOW
COVID-19 SPIKE DOMAIN ANTIBODY INTERPRETATION: POSITIVE
CREAT SERPL-MCNC: 1.19 MG/DL
CREAT SPEC-SCNC: 133 MG/DL
CREAT/PROT UR: 0.3 RATIO
EOSINOPHIL # BLD AUTO: 0.37 K/UL
EOSINOPHIL NFR BLD AUTO: 5.3 %
ESTIMATED AVERAGE GLUCOSE: 214 MG/DL
GLUCOSE QUALITATIVE U: ABNORMAL
GLUCOSE SERPL-MCNC: 237 MG/DL
HBA1C MFR BLD HPLC: 9.1 %
HCT VFR BLD CALC: 45.5 %
HDLC SERPL-MCNC: 43 MG/DL
HGB BLD-MCNC: 14.5 G/DL
HYALINE CASTS: 0 /LPF
IMM GRANULOCYTES NFR BLD AUTO: 0.6 %
KETONES URINE: NEGATIVE
LDLC SERPL CALC-MCNC: 73 MG/DL
LEUKOCYTE ESTERASE URINE: NEGATIVE
LYMPHOCYTES # BLD AUTO: 1.2 K/UL
LYMPHOCYTES NFR BLD AUTO: 17.1 %
MAGNESIUM SERPL-MCNC: 1.6 MG/DL
MAN DIFF?: NORMAL
MCHC RBC-ENTMCNC: 28.8 PG
MCHC RBC-ENTMCNC: 31.9 GM/DL
MCV RBC AUTO: 90.3 FL
MICROSCOPIC-UA: NORMAL
MONOCYTES # BLD AUTO: 0.46 K/UL
MONOCYTES NFR BLD AUTO: 6.6 %
NEUTROPHILS # BLD AUTO: 4.88 K/UL
NEUTROPHILS NFR BLD AUTO: 69.5 %
NITRITE URINE: NEGATIVE
NONHDLC SERPL-MCNC: 88 MG/DL
PH URINE: 6
PHOSPHATE SERPL-MCNC: 3.4 MG/DL
PLATELET # BLD AUTO: 259 K/UL
POTASSIUM SERPL-SCNC: 4.8 MMOL/L
PROT SERPL-MCNC: 7 G/DL
PROT UR-MCNC: 39 MG/DL
PROTEIN URINE: ABNORMAL
RBC # BLD: 5.04 M/UL
RBC # FLD: 13.9 %
RED BLOOD CELLS URINE: 7 /HPF
SARS-COV-2 AB SERPL IA-ACNC: 51 U/ML
SODIUM SERPL-SCNC: 141 MMOL/L
SPECIFIC GRAVITY URINE: 1.02
SQUAMOUS EPITHELIAL CELLS: 0 /HPF
TACROLIMUS SERPL-MCNC: 5.7 NG/ML
TRIGL SERPL-MCNC: 72 MG/DL
URATE SERPL-MCNC: 4.3 MG/DL
UROBILINOGEN URINE: NORMAL
WBC # FLD AUTO: 7.01 K/UL
WHITE BLOOD CELLS URINE: 1 /HPF

## 2021-12-20 PROCEDURE — 99215 OFFICE O/P EST HI 40 MIN: CPT

## 2021-12-20 NOTE — PHYSICAL EXAM
[General Appearance - Alert] : alert [General Appearance - Well Nourished] : well nourished [General Appearance - Well Developed] : well developed [Sclera] : the sclera and conjunctiva were normal [Extraocular Movements] : extraocular movements were intact [Hearing Threshold Finger Rub Not [place]] : hearing was normal [Oropharynx] : the oropharynx was normal [Neck Cervical Mass (___cm)] : no neck mass was observed [Jugular Venous Distention Increased] : there was no jugular-venous distention [] : no respiratory distress [Respiration, Rhythm And Depth] : normal respiratory rhythm and effort [Auscultation Breath Sounds / Voice Sounds] : lungs were clear to auscultation bilaterally [Heart Sounds] : normal S1 and S2 [Heart Sounds Gallop] : no gallops [Heart Sounds Pericardial Friction Rub] : no pericardial rub [Abdomen Soft] : soft [Abdomen Tenderness] : non-tender [Cervical Lymph Nodes Enlarged Posterior Bilaterally] : posterior cervical [Cervical Lymph Nodes Enlarged Anterior Bilaterally] : anterior cervical [Supraclavicular Lymph Nodes Enlarged Bilaterally] : supraclavicular [Axillary Lymph Nodes Enlarged Bilaterally] : axillary [Inguinal Lymph Nodes Enlarged Bilaterally] : inguinal [No CVA Tenderness] : no ~M costovertebral angle tenderness [No Spinal Tenderness] : no spinal tenderness [Motor Tone] : muscle strength and tone were normal [___ (cm) Fistula] : [unfilled] (cm) fistula [Oriented To Time, Place, And Person] : oriented to person, place, and time [Affect] : the affect was normal [Mood] : the mood was normal [FreeTextEntry1] : no tremor

## 2021-12-20 NOTE — ASSESSMENT
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 Currently to continue current dose\par \par Right leg cellulitis: On follow up with wound care team.\par \par Infectious Prophylaxis: On Bactrim.  Yearly Flu vaccine  ) and pneumonia vaccine (prevnar once and pneumovax every 5 years) advised. Advised COVID vaccination.\par \par Hypertension:Controlled. Continue current medications.\par \par DM:  On insulin pump.-On follow up with endocrinology. Dr. Garcia\par \par CVA -Oct 2015. Minimal residues including balance issues Continues to see neurologist , exercise.\par \par Hyperlipidemia: On statin.\par \par Health maintenance - Seeing dermatology, was treated with surgery for squamous cell Ca with Moh surgery and skin grafting on left Pinna. Follows up with Ophthalmology regularly. \par \par Discussed weight loss strategies, low calorie diet. Regular daily physical activity.\par \par Discussed Renal Preservation strategies including achieving optimal weight through calory restriction and regular exercise, daily exercise, sleep hygiene, optimized control of glucose, blood pressure, lipids, avoidance of NSAIDs, Low Na and Low animal protein diet and medication adherence.\par \par Has had eye and derm check frequently.\par \par He will f/u with primary nephrologist periodically Dr. Rondon in 3-6 months and Dr. Miranda PCP. F/u at transplant center yearly.\par \par \par \par \par

## 2021-12-20 NOTE — REASON FOR VISIT
[Follow-Up] : a follow-up visit [FreeTextEntry1] :  Post renal transplant-no acute symptoms, Has had skin grafting on Rt LE

## 2021-12-20 NOTE — HISTORY OF PRESENT ILLNESS
[FreeTextEntry1] : 55 y/o M s/p LURT 12/9/13. \par  \par He has had right leg skin grafting after cellulitis, was at [facility].\par Had mild COVID earlier in the year, recd monoclonal Antibody 8 months earlier, not had vaccine yet.\par Left ear squamous cell Ca, s/p Moh surgery, Had skin grafting from the chest wall.\par Also had basal cell on nose and left arm squamous cell Ca in 2020\par Right second toe tip surgically removed for infection\par Has had LE edema chronic\par His swallowing is allowing to eat regular diet but with extra chewing. Has balance issues and does not drive.\par \par No SOB/fever/urinary symptoms.\par Still has left side heat/cold sensory loss. Not driving, balance is still not perfect. \par Still has difficulty swallowing solids, able to swallow with liquids\par Glucose level has improved, he is on insulin pump for glycemic control now. Dr. Jasbir Garcia\par  Has CGM and insulin pump.\par on f/u with Dr. Miranda- Primary physician\par Nephrologist: Dr. Barker, now retired, seeing Dr. Rondon\par Has had Fluvaccine\par Neurologist: Dr. Campbell\par Endocrinologist- Dr. Garcia\par

## 2021-12-21 NOTE — ED ADULT TRIAGE NOTE - CHIEF COMPLAINT QUOTE
CC:  Isha Hernandez is here today for a consult.    COVID VACCINE Yes, completed on 4/14/2021 Moderna.      Denies known Latex allergy or symptoms of Latex sensitivity.     Medications: medications verified, no change    Tobacco history: verified  Patient's current [facility] status: Inactive - Patient declined.    Health Maintenance Due   Topic Date Due   • DTaP/Tdap/Td Vaccine (1 - Tdap) 07/11/2008   • Shingles Vaccine (2 of 3) 05/25/2017   • COVID-19 Vaccine (3 - Booster for Moderna series) 10/14/2021   • Medicare Wellness Visit  12/04/2021   • Depression Screening  01/04/2022     Patient is due for above topics and will defer to pcp.     Patient would like communication of their results via:       Home Phone: [phone] (home)  Okay to leave a message containing results? Yes   left foot toe infection

## 2021-12-22 LAB
BKV DNA SPEC QL NAA+PROBE: NOT DETECTED COPIES/ML
CMV DNA SPEC QL NAA+PROBE: NOT DETECTED IU/ML

## 2021-12-23 ENCOUNTER — APPOINTMENT (OUTPATIENT)
Dept: WOUND CARE | Facility: HOSPITAL | Age: 56
End: 2021-12-23
Payer: MEDICARE

## 2021-12-23 ENCOUNTER — OUTPATIENT (OUTPATIENT)
Dept: OUTPATIENT SERVICES | Facility: HOSPITAL | Age: 56
LOS: 1 days | Discharge: HOME | End: 2021-12-23
Payer: MEDICARE

## 2021-12-23 VITALS
TEMPERATURE: 97.9 F | SYSTOLIC BLOOD PRESSURE: 137 MMHG | WEIGHT: 315 LBS | HEIGHT: 73 IN | DIASTOLIC BLOOD PRESSURE: 78 MMHG | OXYGEN SATURATION: 100 % | HEART RATE: 67 BPM | RESPIRATION RATE: 24 BRPM | BODY MASS INDEX: 41.75 KG/M2

## 2021-12-23 DIAGNOSIS — Z83.3 FAMILY HISTORY OF DIABETES MELLITUS: ICD-10-CM

## 2021-12-23 DIAGNOSIS — Z94.0 KIDNEY TRANSPLANT STATUS: Chronic | ICD-10-CM

## 2021-12-23 DIAGNOSIS — E11.51 TYPE 2 DIABETES MELLITUS WITH DIABETIC PERIPHERAL ANGIOPATHY WITHOUT GANGRENE: ICD-10-CM

## 2021-12-23 DIAGNOSIS — Z89.422 ACQUIRED ABSENCE OF OTHER LEFT TOE(S): ICD-10-CM

## 2021-12-23 DIAGNOSIS — Z85.828 PERSONAL HISTORY OF OTHER MALIGNANT NEOPLASM OF SKIN: ICD-10-CM

## 2021-12-23 DIAGNOSIS — Z79.899 OTHER LONG TERM (CURRENT) DRUG THERAPY: ICD-10-CM

## 2021-12-23 DIAGNOSIS — E78.5 HYPERLIPIDEMIA, UNSPECIFIED: ICD-10-CM

## 2021-12-23 DIAGNOSIS — Z94.0 KIDNEY TRANSPLANT STATUS: ICD-10-CM

## 2021-12-23 DIAGNOSIS — L97.412 NON-PRESSURE CHRONIC ULCER OF RIGHT HEEL AND MIDFOOT WITH FAT LAYER EXPOSED: ICD-10-CM

## 2021-12-23 DIAGNOSIS — E11.621 TYPE 2 DIABETES MELLITUS WITH FOOT ULCER: ICD-10-CM

## 2021-12-23 DIAGNOSIS — Z79.4 LONG TERM (CURRENT) USE OF INSULIN: ICD-10-CM

## 2021-12-23 DIAGNOSIS — Z80.8 FAMILY HISTORY OF MALIGNANT NEOPLASM OF OTHER ORGANS OR SYSTEMS: ICD-10-CM

## 2021-12-23 DIAGNOSIS — E11.610 TYPE 2 DIABETES MELLITUS WITH DIABETIC NEUROPATHIC ARTHROPATHY: ICD-10-CM

## 2021-12-23 DIAGNOSIS — Z79.82 LONG TERM (CURRENT) USE OF ASPIRIN: ICD-10-CM

## 2021-12-23 DIAGNOSIS — Z86.718 PERSONAL HISTORY OF OTHER VENOUS THROMBOSIS AND EMBOLISM: ICD-10-CM

## 2021-12-23 DIAGNOSIS — Z87.81 PERSONAL HISTORY OF (HEALED) TRAUMATIC FRACTURE: ICD-10-CM

## 2021-12-23 DIAGNOSIS — Z86.16 PERSONAL HISTORY OF COVID-19: ICD-10-CM

## 2021-12-23 DIAGNOSIS — G47.33 OBSTRUCTIVE SLEEP APNEA (ADULT) (PEDIATRIC): ICD-10-CM

## 2021-12-23 DIAGNOSIS — Z89.421 ACQUIRED ABSENCE OF OTHER RIGHT TOE(S): Chronic | ICD-10-CM

## 2021-12-23 DIAGNOSIS — Z89.421 ACQUIRED ABSENCE OF OTHER RIGHT TOE(S): ICD-10-CM

## 2021-12-23 DIAGNOSIS — Z86.73 PERSONAL HISTORY OF TRANSIENT ISCHEMIC ATTACK (TIA), AND CEREBRAL INFARCTION WITHOUT RESIDUAL DEFICITS: ICD-10-CM

## 2021-12-23 DIAGNOSIS — Z80.3 FAMILY HISTORY OF MALIGNANT NEOPLASM OF BREAST: ICD-10-CM

## 2021-12-23 DIAGNOSIS — L84 CORNS AND CALLOSITIES: ICD-10-CM

## 2021-12-23 DIAGNOSIS — N18.6 END STAGE RENAL DISEASE: Chronic | ICD-10-CM

## 2021-12-23 PROCEDURE — 99213 OFFICE O/P EST LOW 20 MIN: CPT

## 2021-12-23 PROCEDURE — G0463: CPT

## 2021-12-23 NOTE — PLAN
[FreeTextEntry1] : 15 blade used to trim callus , continue wound care and off loading  Spent 20 minutes for patient care and medical decision making.\par

## 2021-12-23 NOTE — HISTORY OF PRESENT ILLNESS
[FreeTextEntry1] : DFU 2 lateral right foot , associated with diabetic Charcot foot , clean , granular and the wound is smaller

## 2021-12-23 NOTE — PHYSICAL EXAM
[4 x 4] : 4 x 4  [Abdominal Pad] : Abdominal Pad [0] : left 0 [1+] : left 1+ [Skin Ulcer] : ulcer [Alert] : alert [Oriented to Person] : oriented to person [Oriented to Place] : oriented to place [Oriented to Time] : oriented to time [Calm] : calm [Varicose Veins Of Lower Extremities] : not present [] : not present [Purpura] : no purpura  [Petechiae] : no petechiae [Skin Induration] : no induration [de-identified] : adult WM, NAD, alert, Ox 3. [de-identified] : HTN, HLD [de-identified] : Diabetic Charcot right foot deformity [de-identified] : right foot plantar ulcer down to skin, subcutaneous tissue, fat. [de-identified] : Diabetic neuropathy  [FreeTextEntry1] : Right Plantar Foot  [FreeTextEntry2] : 3.2 [FreeTextEntry3] : 3.0 [FreeTextEntry4] : 0.1 [de-identified] : serosanguineous  [de-identified] : callus - DPM shaved callus [de-identified] : None [de-identified] : Nai  [de-identified] : Cleansed with Normal Saline. \par \par DPM performed Dressing Change at today's Wound Care visit.  [TWNoteComboBox4] : Large [TWNoteComboBox5] : No [TWNoteComboBox6] : Diabetic [de-identified] : No [de-identified] : other [de-identified] : None [de-identified] : None [de-identified] : 100% [de-identified] : No [TWNoteComboBox7] : Luzmaria [de-identified] : Application of skin substitute [de-identified] : Ace wraps [de-identified] : 3x Weekly

## 2021-12-23 NOTE — ASSESSMENT
[Verbal] : Verbal [Demo] : Demo [Patient] : Patient [Family member] : Family member [Good - alert, interested, motivated] : Good - alert, interested, motivated [Verbalizes knowledge/Understanding] : Verbalizes knowledge/understanding [Dressing changes] : dressing changes [Foot Care] : foot care [Skin Care] : skin care [Pressure relief] : pressure relief [Signs and symptoms of infection] : sign and symptoms of infection [How and When to Call] : how and when to call [Off-loading] : off-loading [Compression Therapy] : compression therapy [Patient responsibility to plan of care] : patient responsibility to plan of care [] : Yes [Stable] : stable [Home] : Home [Ambulatory] : Ambulatory [Not Applicable - Long Term Care/Home Health Agency] : Long Term Care/Home Health Agency: Not Applicable [FreeTextEntry2] : Skin Graft\par Infection Prevention \par Compression Therapy \par Restore Optimal Skin Integrity  [FreeTextEntry4] :  Apligraf #2 held until Pt returns from [place] for 10 days starting 12/26/21, \par F/U to [facility] in 2 Weeks \par

## 2021-12-23 NOTE — REVIEW OF SYSTEMS
[Fever] : no fever [Chills] : no chills [Eye Pain] : no eye pain [Loss Of Hearing] : no hearing loss [Shortness Of Breath] : no shortness of breath [Abdominal Pain] : no abdominal pain [Vomiting] : no vomiting [Skin Lesions] : no skin lesions [Skin Wound] : no skin wound [Anxiety] : no anxiety [Easy Bleeding] : no tendency for easy bleeding [FreeTextEntry7] : 40.9 BMI , morbid obesity  [FreeTextEntry5] : HTN,HLD [FreeTextEntry8] : Kidney Transplant  [FreeTextEntry9] : Associated Diabetic Charcot foot  [de-identified] : plantar right foot midfoot , lateral  DFU 2 , 3 separate ulcers , large area of peripheral callus build up  [de-identified] : IDDM with neuropathy  [de-identified] : MARILUZ

## 2022-01-05 NOTE — BRIEF OPERATIVE NOTE - NSICDXBRIEFPROCEDURE_GEN_ALL_CORE_FT
[Normal] : normal rate, regular rhythm, normal S1 and S2 and no murmur heard [No Edema] : there was no peripheral edema [Soft] : abdomen soft [Non Tender] : non-tender [Non-distended] : non-distended [Normal Posterior Cervical Nodes] : no posterior cervical lymphadenopathy [Normal Anterior Cervical Nodes] : no anterior cervical lymphadenopathy [Coordination Grossly Intact] : coordination grossly intact [No Focal Deficits] : no focal deficits [Normal Gait] : normal gait [de-identified] : Older appearing [de-identified] : PICC line in PIP place and right arm.  No surrounding redness, swelling or discharge [de-identified] : Forgetful PROCEDURES:  Partial amputation of second toe 17-Dec-2019 16:11:00  Donte Catherine

## 2022-01-06 ENCOUNTER — APPOINTMENT (OUTPATIENT)
Dept: WOUND CARE | Facility: HOSPITAL | Age: 57
End: 2022-01-06
Payer: MEDICARE

## 2022-01-06 ENCOUNTER — OUTPATIENT (OUTPATIENT)
Dept: OUTPATIENT SERVICES | Facility: HOSPITAL | Age: 57
LOS: 1 days | Discharge: HOME | End: 2022-01-06
Payer: MEDICARE

## 2022-01-06 VITALS
BODY MASS INDEX: 41.75 KG/M2 | WEIGHT: 315 LBS | OXYGEN SATURATION: 99 % | SYSTOLIC BLOOD PRESSURE: 120 MMHG | TEMPERATURE: 97.6 F | DIASTOLIC BLOOD PRESSURE: 72 MMHG | HEIGHT: 73 IN | HEART RATE: 66 BPM | RESPIRATION RATE: 20 BRPM

## 2022-01-06 DIAGNOSIS — N18.6 END STAGE RENAL DISEASE: Chronic | ICD-10-CM

## 2022-01-06 DIAGNOSIS — Z89.421 ACQUIRED ABSENCE OF OTHER RIGHT TOE(S): Chronic | ICD-10-CM

## 2022-01-06 DIAGNOSIS — Z94.0 KIDNEY TRANSPLANT STATUS: Chronic | ICD-10-CM

## 2022-01-06 DIAGNOSIS — E11.621 TYPE 2 DIABETES MELLITUS WITH FOOT ULCER: ICD-10-CM

## 2022-01-06 PROCEDURE — 99213 OFFICE O/P EST LOW 20 MIN: CPT

## 2022-01-06 PROCEDURE — G0463: CPT

## 2022-01-06 NOTE — ED PROVIDER NOTE - CROS ED NEURO ALL NEG
negative... Complex Repair And A-T Advancement Flap Text: The defect edges were debeveled with a #15 scalpel blade.  The primary defect was closed partially with a complex linear closure.  Given the location of the remaining defect, shape of the defect and the proximity to free margins an A-T advancement flap was deemed most appropriate for complete closure of the defect.  Using a sterile surgical marker, an appropriate advancement flap was drawn incorporating the defect and placing the expected incisions within the relaxed skin tension lines where possible.    The area thus outlined was incised deep to adipose tissue with a #15 scalpel blade.  The skin margins were undermined to an appropriate distance in all directions utilizing iris scissors.

## 2022-01-07 NOTE — REVIEW OF SYSTEMS
[Fever] : no fever [Chills] : no chills [Eye Pain] : no eye pain [Loss Of Hearing] : no hearing loss [Shortness Of Breath] : no shortness of breath [Abdominal Pain] : no abdominal pain [Vomiting] : no vomiting [Skin Lesions] : no skin lesions [Skin Wound] : no skin wound [Anxiety] : no anxiety [Easy Bleeding] : no tendency for easy bleeding [FreeTextEntry5] : HTN,HLD [FreeTextEntry7] : 40.9 BMI , morbid obesity  [FreeTextEntry8] : Kidney Transplant  [FreeTextEntry9] : Associated Diabetic Charcot foot  [de-identified] : plantar right foot midfoot , lateral  DFU 2  , large area of peripheral callus build up , the ulcer is clean and granular  [de-identified] : IDDM with neuropathy  [de-identified] : MARILUZ

## 2022-01-07 NOTE — ASSESSMENT
[Verbal] : Verbal [Demo] : Demo [Patient] : Patient [Family member] : Family member [Good - alert, interested, motivated] : Good - alert, interested, motivated [Verbalizes knowledge/Understanding] : Verbalizes knowledge/understanding [Dressing changes] : dressing changes [Foot Care] : foot care [Skin Care] : skin care [Pressure relief] : pressure relief [Signs and symptoms of infection] : sign and symptoms of infection [Nutrition] : nutrition [How and When to Call] : how and when to call [Off-loading] : off-loading [Compression Therapy] : compression therapy [Patient responsibility to plan of care] : patient responsibility to plan of care [] : Yes [Stable] : stable [Home] : Home [Ambulatory] : Ambulatory [Not Applicable - Long Term Care/Home Health Agency] : Long Term Care/Home Health Agency: Not Applicable [Home Health] : home health [Glycemic Control] : glycemic control [FreeTextEntry2] : Infection Prevention\par Promote Skin Integrity\par Offloading \par Keep pressure off wound site\par Encourage Glycemic Control\par Compression Compliance\par elevation\par low Na+ Diet\par weight reduction strategies\par CROW Boot\par Skin Substitute therapy [FreeTextEntry4] : F/U 1 week for assessment\par No Apligraf available to apply today, next week applications will resume\par Pt to receive CROW boot next week \par Vascular consult submitted as per DPM

## 2022-01-07 NOTE — PLAN
[FreeTextEntry1] : continue wound care , off loading and apligraf next week , patient to have vascular studies Spent 20 minutes for patient care and medical decision making.\par

## 2022-01-07 NOTE — PHYSICAL EXAM
[4 x 4] : 4 x 4  [Abdominal Pad] : Abdominal Pad [0] : left 0 [1+] : left 1+ [Skin Ulcer] : ulcer [Alert] : alert [Oriented to Person] : oriented to person [Oriented to Place] : oriented to place [Oriented to Time] : oriented to time [Calm] : calm [Varicose Veins Of Lower Extremities] : not present [] : not present [Purpura] : no purpura  [Petechiae] : no petechiae [Skin Induration] : no induration [de-identified] : adult WM, NAD, alert, Ox 3. [de-identified] : HTN, HLD [de-identified] : Diabetic Charcot right foot deformity [de-identified] : right foot plantar ulcer down to skin, subcutaneous tissue, fat. [de-identified] : Diabetic neuropathy  [de-identified] : Circ neuromuscular function WNL post ace compression, pt expressed comfort.\par  [FreeTextEntry1] : Right Plantar Foot  [FreeTextEntry2] : 3.4 [FreeTextEntry3] : 2.7 [FreeTextEntry4] : 0.1 [de-identified] : serosanguineous  [de-identified] : callus - DPM shaved callus [de-identified] : 15-20% [de-identified] : None [de-identified] : Nai  [de-identified] : Cleansed with Normal Saline.  [TWNoteComboBox4] : Small [TWNoteComboBox5] : No [TWNoteComboBox6] : Diabetic [de-identified] : No [de-identified] : other [de-identified] : None [de-identified] : None [de-identified] : >75% [de-identified] : Yes [de-identified] : Ace wraps [de-identified] : 3x Weekly [de-identified] : Primary Dressing

## 2022-01-08 DIAGNOSIS — Z89.422 ACQUIRED ABSENCE OF OTHER LEFT TOE(S): ICD-10-CM

## 2022-01-08 DIAGNOSIS — Z80.8 FAMILY HISTORY OF MALIGNANT NEOPLASM OF OTHER ORGANS OR SYSTEMS: ICD-10-CM

## 2022-01-08 DIAGNOSIS — Z79.899 OTHER LONG TERM (CURRENT) DRUG THERAPY: ICD-10-CM

## 2022-01-08 DIAGNOSIS — Z79.4 LONG TERM (CURRENT) USE OF INSULIN: ICD-10-CM

## 2022-01-08 DIAGNOSIS — E78.5 HYPERLIPIDEMIA, UNSPECIFIED: ICD-10-CM

## 2022-01-08 DIAGNOSIS — Z86.718 PERSONAL HISTORY OF OTHER VENOUS THROMBOSIS AND EMBOLISM: ICD-10-CM

## 2022-01-08 DIAGNOSIS — Z86.16 PERSONAL HISTORY OF COVID-19: ICD-10-CM

## 2022-01-08 DIAGNOSIS — Z87.81 PERSONAL HISTORY OF (HEALED) TRAUMATIC FRACTURE: ICD-10-CM

## 2022-01-08 DIAGNOSIS — L84 CORNS AND CALLOSITIES: ICD-10-CM

## 2022-01-08 DIAGNOSIS — Z80.3 FAMILY HISTORY OF MALIGNANT NEOPLASM OF BREAST: ICD-10-CM

## 2022-01-08 DIAGNOSIS — Z89.421 ACQUIRED ABSENCE OF OTHER RIGHT TOE(S): ICD-10-CM

## 2022-01-08 DIAGNOSIS — Z94.0 KIDNEY TRANSPLANT STATUS: ICD-10-CM

## 2022-01-08 DIAGNOSIS — E11.51 TYPE 2 DIABETES MELLITUS WITH DIABETIC PERIPHERAL ANGIOPATHY WITHOUT GANGRENE: ICD-10-CM

## 2022-01-08 DIAGNOSIS — G47.33 OBSTRUCTIVE SLEEP APNEA (ADULT) (PEDIATRIC): ICD-10-CM

## 2022-01-08 DIAGNOSIS — E11.610 TYPE 2 DIABETES MELLITUS WITH DIABETIC NEUROPATHIC ARTHROPATHY: ICD-10-CM

## 2022-01-08 DIAGNOSIS — Z85.828 PERSONAL HISTORY OF OTHER MALIGNANT NEOPLASM OF SKIN: ICD-10-CM

## 2022-01-08 DIAGNOSIS — Z86.73 PERSONAL HISTORY OF TRANSIENT ISCHEMIC ATTACK (TIA), AND CEREBRAL INFARCTION WITHOUT RESIDUAL DEFICITS: ICD-10-CM

## 2022-01-08 DIAGNOSIS — Z79.82 LONG TERM (CURRENT) USE OF ASPIRIN: ICD-10-CM

## 2022-01-08 DIAGNOSIS — Z83.3 FAMILY HISTORY OF DIABETES MELLITUS: ICD-10-CM

## 2022-01-08 DIAGNOSIS — E66.01 MORBID (SEVERE) OBESITY DUE TO EXCESS CALORIES: ICD-10-CM

## 2022-01-08 DIAGNOSIS — L97.412 NON-PRESSURE CHRONIC ULCER OF RIGHT HEEL AND MIDFOOT WITH FAT LAYER EXPOSED: ICD-10-CM

## 2022-01-08 DIAGNOSIS — E11.621 TYPE 2 DIABETES MELLITUS WITH FOOT ULCER: ICD-10-CM

## 2022-01-13 ENCOUNTER — APPOINTMENT (OUTPATIENT)
Dept: WOUND CARE | Facility: HOSPITAL | Age: 57
End: 2022-01-13
Payer: MEDICARE

## 2022-01-13 ENCOUNTER — OUTPATIENT (OUTPATIENT)
Dept: OUTPATIENT SERVICES | Facility: HOSPITAL | Age: 57
LOS: 1 days | Discharge: HOME | End: 2022-01-13
Payer: MEDICARE

## 2022-01-13 VITALS
WEIGHT: 315 LBS | BODY MASS INDEX: 41.75 KG/M2 | HEART RATE: 73 BPM | DIASTOLIC BLOOD PRESSURE: 87 MMHG | SYSTOLIC BLOOD PRESSURE: 144 MMHG | RESPIRATION RATE: 20 BRPM | HEIGHT: 73 IN | OXYGEN SATURATION: 73 % | TEMPERATURE: 97.9 F

## 2022-01-13 DIAGNOSIS — E11.621 TYPE 2 DIABETES MELLITUS WITH FOOT ULCER: ICD-10-CM

## 2022-01-13 DIAGNOSIS — N18.6 END STAGE RENAL DISEASE: Chronic | ICD-10-CM

## 2022-01-13 DIAGNOSIS — Z89.421 ACQUIRED ABSENCE OF OTHER RIGHT TOE(S): Chronic | ICD-10-CM

## 2022-01-13 DIAGNOSIS — Z94.0 KIDNEY TRANSPLANT STATUS: Chronic | ICD-10-CM

## 2022-01-13 PROCEDURE — 87070 CULTURE OTHR SPECIMN AEROBIC: CPT

## 2022-01-13 PROCEDURE — 87077 CULTURE AEROBIC IDENTIFY: CPT

## 2022-01-13 PROCEDURE — G0463: CPT

## 2022-01-13 PROCEDURE — 99213 OFFICE O/P EST LOW 20 MIN: CPT

## 2022-01-14 NOTE — PHYSICAL EXAM
[4 x 4] : 4 x 4  [Abdominal Pad] : Abdominal Pad [0] : left 0 [1+] : left 1+ [Skin Ulcer] : ulcer [Alert] : alert [Oriented to Person] : oriented to person [Oriented to Place] : oriented to place [Oriented to Time] : oriented to time [Calm] : calm [Varicose Veins Of Lower Extremities] : not present [] : not present [Purpura] : no purpura  [Petechiae] : no petechiae [Skin Induration] : no induration [de-identified] : adult WM, NAD, alert, Ox 3. [de-identified] : HTN, HLD [de-identified] : Diabetic Charcot right foot deformity [de-identified] : right foot plantar ulcer down to skin, subcutaneous tissue, fat. , increased serous drainage , no soi  [de-identified] : Diabetic neuropathy  [de-identified] : Circ neuromuscular function WNL post ace compression, pt expressed comfort.\par Callus shaved by DPM \par  [FreeTextEntry1] : Right Plantar Foot  [FreeTextEntry2] : 3.2 [FreeTextEntry3] : 2.6 [FreeTextEntry4] : 0.1 [de-identified] : serosanguineous  [de-identified] : callus [de-identified] : 90%  [de-identified] : 10% [de-identified] : None [de-identified] : Silver Alginate [de-identified] : Cleansed with Dakins solution then Normal Saline.  [TWNoteComboBox4] : Moderate [TWNoteComboBox5] : No [TWNoteComboBox6] : Diabetic [de-identified] : No [de-identified] : other [de-identified] : None [de-identified] : None [de-identified] : >75% [de-identified] : Yes [de-identified] : Cultures obtained [de-identified] : Ace wraps [de-identified] : Daily [de-identified] : Primary Dressing

## 2022-01-14 NOTE — PLAN
[FreeTextEntry1] : Off load , daily wound care , await cultures and endocrinology consult . PTR 1 week Spent 20 minutes for patient care and medical decision making.\par

## 2022-01-14 NOTE — REVIEW OF SYSTEMS
[Fever] : no fever [Chills] : no chills [Eye Pain] : no eye pain [Loss Of Hearing] : no hearing loss [Shortness Of Breath] : no shortness of breath [Abdominal Pain] : no abdominal pain [Vomiting] : no vomiting [Skin Lesions] : no skin lesions [Skin Wound] : no skin wound [Anxiety] : no anxiety [Easy Bleeding] : no tendency for easy bleeding [FreeTextEntry5] : HTN,HLD [FreeTextEntry7] : 40.9 BMI , morbid obesity  [FreeTextEntry8] : Kidney Transplant  [FreeTextEntry9] : Associated Diabetic Charcot foot  [de-identified] : plantar right foot midfoot , lateral  DFU 2  , large area of peripheral callus build up , the ulcer is clean and granular , increased swe [de-identified] : IDDM with neuropathy  [de-identified] : MARILUZ

## 2022-01-14 NOTE — ASSESSMENT
[Verbal] : Verbal [Demo] : Demo [Patient] : Patient [Family member] : Family member [Good - alert, interested, motivated] : Good - alert, interested, motivated [Verbalizes knowledge/Understanding] : Verbalizes knowledge/understanding [Dressing changes] : dressing changes [Foot Care] : foot care [Skin Care] : skin care [Pressure relief] : pressure relief [Signs and symptoms of infection] : sign and symptoms of infection [Nutrition] : nutrition [How and When to Call] : how and when to call [Off-loading] : off-loading [Compression Therapy] : compression therapy [Home Health] : home health [Patient responsibility to plan of care] : patient responsibility to plan of care [Glycemic Control] : glycemic control [] : Yes [Stable] : stable [Home] : Home [Ambulatory] : Ambulatory [Not Applicable - Long Term Care/Home Health Agency] : Long Term Care/Home Health Agency: Not Applicable [FreeTextEntry2] : Infection Prevention\par Promote Skin Integrity\par Offloading \par Keep pressure off wound site\par Encourage Glycemic Control\par Compression Compliance\par elevation\par low Na+ Diet\par weight reduction strategies\par CROW Boot\par Culture\par F/U 1 week \par Skin Substitute therapy [FreeTextEntry4] : Apligraf held due to increased drainage\par Culture obtained\par F/U 1 week

## 2022-01-14 NOTE — HISTORY OF PRESENT ILLNESS
[FreeTextEntry1] : DFU 2 plantar right foot , clean , granular but increased drainage ( serous)  C&S taken , change dressing daily , speak with endocrinologists about elevated glucose , await C&S , patient is renal transplant , will talk with ID if patient has unusual colonization of the ulcer  .

## 2022-01-14 NOTE — VITALS
[Pain related to present condition?] : The patient's  pain is not related to present condition. [] : No [de-identified] : 0/10

## 2022-01-16 LAB
CULTURE RESULTS: SIGNIFICANT CHANGE UP
SPECIMEN SOURCE: SIGNIFICANT CHANGE UP

## 2022-01-17 DIAGNOSIS — Z80.3 FAMILY HISTORY OF MALIGNANT NEOPLASM OF BREAST: ICD-10-CM

## 2022-01-17 DIAGNOSIS — G47.33 OBSTRUCTIVE SLEEP APNEA (ADULT) (PEDIATRIC): ICD-10-CM

## 2022-01-17 DIAGNOSIS — Z79.4 LONG TERM (CURRENT) USE OF INSULIN: ICD-10-CM

## 2022-01-17 DIAGNOSIS — L84 CORNS AND CALLOSITIES: ICD-10-CM

## 2022-01-17 DIAGNOSIS — Z86.73 PERSONAL HISTORY OF TRANSIENT ISCHEMIC ATTACK (TIA), AND CEREBRAL INFARCTION WITHOUT RESIDUAL DEFICITS: ICD-10-CM

## 2022-01-17 DIAGNOSIS — E11.621 TYPE 2 DIABETES MELLITUS WITH FOOT ULCER: ICD-10-CM

## 2022-01-17 DIAGNOSIS — L97.412 NON-PRESSURE CHRONIC ULCER OF RIGHT HEEL AND MIDFOOT WITH FAT LAYER EXPOSED: ICD-10-CM

## 2022-01-17 DIAGNOSIS — E66.01 MORBID (SEVERE) OBESITY DUE TO EXCESS CALORIES: ICD-10-CM

## 2022-01-17 DIAGNOSIS — Z79.82 LONG TERM (CURRENT) USE OF ASPIRIN: ICD-10-CM

## 2022-01-17 DIAGNOSIS — Z85.828 PERSONAL HISTORY OF OTHER MALIGNANT NEOPLASM OF SKIN: ICD-10-CM

## 2022-01-17 DIAGNOSIS — Z83.3 FAMILY HISTORY OF DIABETES MELLITUS: ICD-10-CM

## 2022-01-17 DIAGNOSIS — Z79.899 OTHER LONG TERM (CURRENT) DRUG THERAPY: ICD-10-CM

## 2022-01-17 DIAGNOSIS — Z89.422 ACQUIRED ABSENCE OF OTHER LEFT TOE(S): ICD-10-CM

## 2022-01-17 DIAGNOSIS — Z89.421 ACQUIRED ABSENCE OF OTHER RIGHT TOE(S): ICD-10-CM

## 2022-01-17 DIAGNOSIS — E11.610 TYPE 2 DIABETES MELLITUS WITH DIABETIC NEUROPATHIC ARTHROPATHY: ICD-10-CM

## 2022-01-17 DIAGNOSIS — E11.51 TYPE 2 DIABETES MELLITUS WITH DIABETIC PERIPHERAL ANGIOPATHY WITHOUT GANGRENE: ICD-10-CM

## 2022-01-17 DIAGNOSIS — E78.5 HYPERLIPIDEMIA, UNSPECIFIED: ICD-10-CM

## 2022-01-17 DIAGNOSIS — Z80.8 FAMILY HISTORY OF MALIGNANT NEOPLASM OF OTHER ORGANS OR SYSTEMS: ICD-10-CM

## 2022-01-17 DIAGNOSIS — Z94.0 KIDNEY TRANSPLANT STATUS: ICD-10-CM

## 2022-01-17 DIAGNOSIS — Z86.16 PERSONAL HISTORY OF COVID-19: ICD-10-CM

## 2022-01-17 DIAGNOSIS — Z86.718 PERSONAL HISTORY OF OTHER VENOUS THROMBOSIS AND EMBOLISM: ICD-10-CM

## 2022-01-17 DIAGNOSIS — Z87.81 PERSONAL HISTORY OF (HEALED) TRAUMATIC FRACTURE: ICD-10-CM

## 2022-01-20 ENCOUNTER — OUTPATIENT (OUTPATIENT)
Dept: OUTPATIENT SERVICES | Facility: HOSPITAL | Age: 57
LOS: 1 days | Discharge: HOME | End: 2022-01-20
Payer: MEDICARE

## 2022-01-20 ENCOUNTER — APPOINTMENT (OUTPATIENT)
Dept: WOUND CARE | Facility: HOSPITAL | Age: 57
End: 2022-01-20
Payer: MEDICARE

## 2022-01-20 VITALS
DIASTOLIC BLOOD PRESSURE: 86 MMHG | OXYGEN SATURATION: 98 % | RESPIRATION RATE: 20 BRPM | TEMPERATURE: 97.6 F | WEIGHT: 315 LBS | HEART RATE: 86 BPM | SYSTOLIC BLOOD PRESSURE: 162 MMHG | BODY MASS INDEX: 41.75 KG/M2 | HEIGHT: 73 IN

## 2022-01-20 DIAGNOSIS — Z89.421 ACQUIRED ABSENCE OF OTHER RIGHT TOE(S): Chronic | ICD-10-CM

## 2022-01-20 DIAGNOSIS — N18.6 END STAGE RENAL DISEASE: Chronic | ICD-10-CM

## 2022-01-20 DIAGNOSIS — E11.621 TYPE 2 DIABETES MELLITUS WITH FOOT ULCER: ICD-10-CM

## 2022-01-20 DIAGNOSIS — Z94.0 KIDNEY TRANSPLANT STATUS: Chronic | ICD-10-CM

## 2022-01-20 PROCEDURE — 99213 OFFICE O/P EST LOW 20 MIN: CPT

## 2022-01-20 PROCEDURE — G0463: CPT

## 2022-01-20 NOTE — ASSESSMENT
[Verbal] : Verbal [Written] : Written [Demo] : Demo [Patient] : Patient [Good - alert, interested, motivated] : Good - alert, interested, motivated [Demonstrates independently] : demonstrates independently [Dressing changes] : dressing changes [Foot Care] : foot care [Skin Care] : skin care [Signs and symptoms of infection] : sign and symptoms of infection [Nutrition] : nutrition [How and When to Call] : how and when to call [Off-loading] : off-loading [Compression Therapy] : compression therapy [Patient responsibility to plan of care] : patient responsibility to plan of care [Glycemic Control] : glycemic control [] : Yes [Stable] : stable [Home] : Home [Ambulatory] : Ambulatory [Not Applicable - Long Term Care/Home Health Agency] : Long Term Care/Home Health Agency: Not Applicable [FreeTextEntry2] : Infection prevention \par Wound care (dressing changes)\par Maintain optimal skin integrity to high pressure areas\par Compression therapy\par Elevation and low sodium compliance.\par Glycemic Control\par Weight reduction Strategies  [FreeTextEntry4] : Culture reviewed by DPM this visit.\par Offloading compliance discussed \par F/u in 1 week

## 2022-01-20 NOTE — PLAN
[FreeTextEntry1] : Offload , daily wound care, endocrinology consult, pt to begin wearing CROW after follow up with orthotist. PTR 1 week. Spent 20 minutes for patient care and medical decision making.\par

## 2022-01-20 NOTE — PHYSICAL EXAM
[4 x 4] : 4 x 4  [Abdominal Pad] : Abdominal Pad [0] : left 0 [1+] : left 1+ [Varicose Veins Of Lower Extremities] : not present [] : not present [Purpura] : no purpura  [Petechiae] : no petechiae [Skin Ulcer] : ulcer [Skin Induration] : no induration [Alert] : alert [Oriented to Person] : oriented to person [Oriented to Place] : oriented to place [Oriented to Time] : oriented to time [Calm] : calm [de-identified] : adult WM, NAD, alert, Ox 3. [de-identified] : HTN, HLD [de-identified] : Diabetic Charcot right foot deformity [de-identified] : right foot plantar ulcer down to skin, subcutaneous tissue, fat, no soi  [de-identified] : Diabetic neuropathy  [FreeTextEntry1] : Right Plantar Foot [FreeTextEntry2] : 3.4 [FreeTextEntry3] : 2.4 [FreeTextEntry4] : 0.1 [de-identified] : serosanguineous [de-identified] : callused [de-identified] : 100% [de-identified] : Pt expressed comfort post ACE application. Circ/Neuromuscular functions WNL post application. [de-identified] : Alginate Ag [de-identified] : NSC\par DD\par  [TWNoteComboBox4] : Small [TWNoteComboBox5] : No [TWNoteComboBox6] : Diabetic [de-identified] : No [de-identified] : other [de-identified] : None [de-identified] : Ace wraps [de-identified] : 3x Weekly [de-identified] : Primary Dressing

## 2022-01-20 NOTE — HISTORY OF PRESENT ILLNESS
[FreeTextEntry1] : DFU 2 plantar right foot , clean , granular but increased drainage ( serous)  C&S taken , change dressing daily , patient is renal transplant.

## 2022-01-20 NOTE — REVIEW OF SYSTEMS
[Fever] : no fever [Chills] : no chills [Eye Pain] : no eye pain [Loss Of Hearing] : no hearing loss [Shortness Of Breath] : no shortness of breath [Abdominal Pain] : no abdominal pain [Vomiting] : no vomiting [Skin Lesions] : no skin lesions [Skin Wound] : no skin wound [Anxiety] : no anxiety [Easy Bleeding] : no tendency for easy bleeding [FreeTextEntry5] : HTN,HLD [FreeTextEntry7] : 40.9 BMI , morbid obesity  [FreeTextEntry8] : Kidney Transplant  [FreeTextEntry9] : Associated Diabetic Charcot foot  [de-identified] : plantar right foot midfoot , lateral  DFU 2  , large area of peripheral callus build up , the ulcer is clean and granular , increased swe [de-identified] : IDDM with neuropathy  [de-identified] : MARILUZ

## 2022-01-21 DIAGNOSIS — E78.5 HYPERLIPIDEMIA, UNSPECIFIED: ICD-10-CM

## 2022-01-21 DIAGNOSIS — Z86.73 PERSONAL HISTORY OF TRANSIENT ISCHEMIC ATTACK (TIA), AND CEREBRAL INFARCTION WITHOUT RESIDUAL DEFICITS: ICD-10-CM

## 2022-01-21 DIAGNOSIS — E11.621 TYPE 2 DIABETES MELLITUS WITH FOOT ULCER: ICD-10-CM

## 2022-01-21 DIAGNOSIS — Z83.3 FAMILY HISTORY OF DIABETES MELLITUS: ICD-10-CM

## 2022-01-21 DIAGNOSIS — Z86.16 PERSONAL HISTORY OF COVID-19: ICD-10-CM

## 2022-01-21 DIAGNOSIS — Z79.4 LONG TERM (CURRENT) USE OF INSULIN: ICD-10-CM

## 2022-01-21 DIAGNOSIS — Z94.0 KIDNEY TRANSPLANT STATUS: ICD-10-CM

## 2022-01-21 DIAGNOSIS — Z85.828 PERSONAL HISTORY OF OTHER MALIGNANT NEOPLASM OF SKIN: ICD-10-CM

## 2022-01-21 DIAGNOSIS — Z89.422 ACQUIRED ABSENCE OF OTHER LEFT TOE(S): ICD-10-CM

## 2022-01-21 DIAGNOSIS — E11.610 TYPE 2 DIABETES MELLITUS WITH DIABETIC NEUROPATHIC ARTHROPATHY: ICD-10-CM

## 2022-01-21 DIAGNOSIS — L97.412 NON-PRESSURE CHRONIC ULCER OF RIGHT HEEL AND MIDFOOT WITH FAT LAYER EXPOSED: ICD-10-CM

## 2022-01-21 DIAGNOSIS — Z89.421 ACQUIRED ABSENCE OF OTHER RIGHT TOE(S): ICD-10-CM

## 2022-01-21 DIAGNOSIS — Z79.82 LONG TERM (CURRENT) USE OF ASPIRIN: ICD-10-CM

## 2022-01-21 DIAGNOSIS — Z80.8 FAMILY HISTORY OF MALIGNANT NEOPLASM OF OTHER ORGANS OR SYSTEMS: ICD-10-CM

## 2022-01-21 DIAGNOSIS — G47.33 OBSTRUCTIVE SLEEP APNEA (ADULT) (PEDIATRIC): ICD-10-CM

## 2022-01-21 DIAGNOSIS — Z87.81 PERSONAL HISTORY OF (HEALED) TRAUMATIC FRACTURE: ICD-10-CM

## 2022-01-21 DIAGNOSIS — Z80.3 FAMILY HISTORY OF MALIGNANT NEOPLASM OF BREAST: ICD-10-CM

## 2022-01-21 DIAGNOSIS — E11.51 TYPE 2 DIABETES MELLITUS WITH DIABETIC PERIPHERAL ANGIOPATHY WITHOUT GANGRENE: ICD-10-CM

## 2022-01-21 DIAGNOSIS — L84 CORNS AND CALLOSITIES: ICD-10-CM

## 2022-01-21 DIAGNOSIS — E66.01 MORBID (SEVERE) OBESITY DUE TO EXCESS CALORIES: ICD-10-CM

## 2022-01-21 DIAGNOSIS — Z86.718 PERSONAL HISTORY OF OTHER VENOUS THROMBOSIS AND EMBOLISM: ICD-10-CM

## 2022-01-21 DIAGNOSIS — Z79.899 OTHER LONG TERM (CURRENT) DRUG THERAPY: ICD-10-CM

## 2022-01-27 NOTE — PHYSICAL EXAM
[0] : left 0 [1+] : left 1+ [Skin Ulcer] : ulcer [Alert] : alert [Oriented to Person] : oriented to person [Oriented to Place] : oriented to place [Oriented to Time] : oriented to time [Calm] : calm [4 x 4] : 4 x 4  [Abdominal Pad] : Abdominal Pad [Varicose Veins Of Lower Extremities] : not present [] : not present [Purpura] : no purpura  [Petechiae] : no petechiae [Skin Induration] : no induration [de-identified] : adult WM, NAD, alert, Ox 3. [de-identified] : Diabetic charcot foot  [de-identified] : right foot plantar ulcer down to skin, subcutaneous tissue, fat. [de-identified] : Diabetic neuropathy  [FreeTextEntry1] : Right plantar foot  [FreeTextEntry2] : 1.1 [FreeTextEntry3] : 0.8 [FreeTextEntry4] : 0.2 [de-identified] : Serous/sanguinous [de-identified] : Callus  [de-identified] : Expressed comfort post ACE application. [de-identified] : DPM applied Knu knit post debridement  [de-identified] : Cleansed with NS\par Kerlix  [FreeTextEntry7] : Right foot lateral 5th Met (New)  [FreeTextEntry8] : 0.8 [FreeTextEntry9] : 0.4 [de-identified] : 0.2 [de-identified] : Serous/sanguinous [de-identified] : Callus  [de-identified] : Expressed comfort post ACE application. [de-identified] : DPM applied Knu knit post debridement  [de-identified] : Cleansed with NS\par Kerlix  [de-identified] : Right lateral foot  [de-identified] : 2.5 [de-identified] : 1.4 [de-identified] : 0.3 [de-identified] : Serous/sanguinous [de-identified] : 3cm @ 5 - 7 o'clock  [de-identified] : Callus  [de-identified] : Expressed comfort post ACE application. [de-identified] : DPM applied Knu knit post debridement  [de-identified] : Cleansed with NS\par Kerlix  [de-identified] : * All wounds interconnected after debridement.\par  Post debridement measurements: 6/6/0.2 - 0.3 \par Silver alginate EOD after first bandage change.  [TWNoteComboBox4] : Small [de-identified] : Erythema [de-identified] : None [de-identified] : None [de-identified] : 100% [de-identified] : No [TWNoteComboBox7] : Luzmaria [de-identified] : Ace wraps [de-identified] : Other [de-identified] : Small [de-identified] : other [de-identified] : None [de-identified] : None [de-identified] : >75% [de-identified] : No [TWNoteComboBox8] : Luzmaria [de-identified] : Ace wraps [de-identified] : Other [de-identified] : Small [de-identified] : Yes [de-identified] : other [de-identified] : None [de-identified] : None [de-identified] : >75% [de-identified] : No [de-identified] : Luzmaria [de-identified] : Ace wraps [de-identified] : Other

## 2022-01-27 NOTE — REASON FOR VISIT
[FreeTextEntry5] : Right plantar to lateral foot .  [FreeTextEntry4] : DFU 2 plantar lateral right foot , associated with Charcot foot , patient high risk of limb loss [FreeTextEntry3] : Blistered with necrotic , slough

## 2022-01-27 NOTE — REVIEW OF SYSTEMS
[FreeTextEntry1] : 0917hr [Fever] : no fever [Chills] : no chills [Eye Pain] : no eye pain [Loss Of Hearing] : no hearing loss [Shortness Of Breath] : no shortness of breath [Abdominal Pain] : no abdominal pain [Vomiting] : no vomiting [Skin Lesions] : no skin lesions [Skin Wound] : no skin wound [Anxiety] : no anxiety [Easy Bleeding] : no tendency for easy bleeding [FreeTextEntry5] : HTN,HLD [FreeTextEntry8] : Kidney Transplant  [FreeTextEntry9] : Associated Diabetic Charcot foot  [de-identified] : plantar right foot midfoot , lateral  DFU 2 , blistered with necrotic tissue  [de-identified] : IDDM with neuropathy  [de-identified] : MARILUZ

## 2022-01-27 NOTE — REVIEW OF SYSTEMS
[FreeTextEntry1] : 0917hr [Fever] : no fever [Chills] : no chills [Eye Pain] : no eye pain [Loss Of Hearing] : no hearing loss [Shortness Of Breath] : no shortness of breath [Abdominal Pain] : no abdominal pain [Vomiting] : no vomiting [Skin Lesions] : no skin lesions [Skin Wound] : no skin wound [Anxiety] : no anxiety [Easy Bleeding] : no tendency for easy bleeding [FreeTextEntry5] : HTN,HLD [FreeTextEntry8] : Kidney Transplant  [FreeTextEntry9] : Associated Diabetic Charcot foot  [de-identified] : plantar right foot midfoot , lateral  DFU 2 , blistered with necrotic tissue  [de-identified] : IDDM with neuropathy  [de-identified] : MARILUZ

## 2022-01-27 NOTE — PROCEDURE
[Saline] : saline [Fibrotic] : fibrotic [Slough] : slough [Necrotic] : necrotic [Scalpel] : scalpel [Sharp scissors] : sharp scissors [Skin] : skin [Fat] : fat [Subcutaneous tissue] : subcutaneous tissue [Sent to Lab] : which was entirely removed and was sent to the laboratory for [Culture and Sensitivity] : culture and sensitivity [Clean] : clean [Pressure] : pressure [AgNo3 Cauterization] : AgNo3 cauterization [Surgical Thrombipad] : surgical thrombipad [FreeTextEntry2] : DFU  2 right  [FreeTextEntry1] : Thrombipad [] : No [FreeTextEntry9] : 0935hr [de-identified] : DFU 2 plantar right  [de-identified] : Teodora [FreeTextEntry6] : DFU 2 plantar right  [FreeTextEntry7] : same [de-identified] : 0 [de-identified] : 10cc [de-identified] : necrotic skin, subcutaneous and fat

## 2022-01-27 NOTE — PROCEDURE
[Saline] : saline [Fibrotic] : fibrotic [Slough] : slough [Necrotic] : necrotic [Scalpel] : scalpel [Sharp scissors] : sharp scissors [Skin] : skin [Fat] : fat [Subcutaneous tissue] : subcutaneous tissue [Sent to Lab] : which was entirely removed and was sent to the laboratory for [Culture and Sensitivity] : culture and sensitivity [Clean] : clean [Pressure] : pressure [AgNo3 Cauterization] : AgNo3 cauterization [Surgical Thrombipad] : surgical thrombipad [FreeTextEntry2] : DFU  2 right  [FreeTextEntry1] : Thrombipad [] : No [FreeTextEntry9] : 0935hr [de-identified] : DFU 2 plantar right  [de-identified] : Teodora [FreeTextEntry6] : DFU 2 plantar right  [FreeTextEntry7] : same [de-identified] : 0 [de-identified] : 10cc [de-identified] : necrotic skin, subcutaneous and fat

## 2022-01-27 NOTE — ASSESSMENT
[Verbal] : Verbal [Written] : Written [Patient] : Patient [Family member] : Family member [Fair - mild discomfort, physical impairment, low acceptance] : Fair - mild discomfort, physical impairment, low acceptance [Needs reinforcement] : needs reinforcement [Dressing changes] : dressing changes [Foot Care] : foot care [Skin Care] : skin care [Signs and symptoms of infection] : sign and symptoms of infection [Venous Disease] : venous disease [Nutrition] : nutrition [How and When to Call] : how and when to call [Labs and Tests] : labs and tests [Pain Management] : pain management [Off-loading] : off-loading [Patient responsibility to plan of care] : patient responsibility to plan of care [Glycemic Control] : glycemic control [Stable] : stable [Home] : Home [Ambulatory] : Ambulatory [Not Applicable - Long Term Care/Home Health Agency] : Long Term Care/Home Health Agency: Not Applicable [] : No [FreeTextEntry2] : Infection prevention\par Localized wound care \par Goal of remaining pain free regarding wounds.\par Offloading \par Promote optimal nutrition  [FreeTextEntry3] : Debridement preformed  [FreeTextEntry4] : Same day auth submitted for debridement \par Tissue culture sent to lab\par follow up in 1 week\par \par * Vital signs were taken and were within normal limits at time of appointment.  inveterately Deleted

## 2022-01-27 NOTE — PHYSICAL EXAM
[0] : left 0 [1+] : left 1+ [Skin Ulcer] : ulcer [Alert] : alert [Oriented to Person] : oriented to person [Oriented to Place] : oriented to place [Oriented to Time] : oriented to time [Calm] : calm [4 x 4] : 4 x 4  [Abdominal Pad] : Abdominal Pad [Varicose Veins Of Lower Extremities] : not present [] : not present [Purpura] : no purpura  [Petechiae] : no petechiae [Skin Induration] : no induration [de-identified] : adult WM, NAD, alert, Ox 3. [de-identified] : Diabetic charcot foot  [de-identified] : right foot plantar ulcer down to skin, subcutaneous tissue, fat. [de-identified] : Diabetic neuropathy  [FreeTextEntry1] : Right plantar foot  [FreeTextEntry2] : 1.1 [FreeTextEntry3] : 0.8 [FreeTextEntry4] : 0.2 [de-identified] : Serous/sanguinous [de-identified] : Callus  [de-identified] : Expressed comfort post ACE application. [de-identified] : DPM applied Knu knit post debridement  [de-identified] : Cleansed with NS\par Kerlix  [FreeTextEntry7] : Right foot lateral 5th Met (New)  [FreeTextEntry8] : 0.8 [FreeTextEntry9] : 0.4 [de-identified] : 0.2 [de-identified] : Serous/sanguinous [de-identified] : Callus  [de-identified] : Expressed comfort post ACE application. [de-identified] : DPM applied Knu knit post debridement  [de-identified] : Cleansed with NS\par Kerlix  [de-identified] : Right lateral foot  [de-identified] : 2.5 [de-identified] : 1.4 [de-identified] : 0.3 [de-identified] : Serous/sanguinous [de-identified] : 3cm @ 5 - 7 o'clock  [de-identified] : Callus  [de-identified] : Expressed comfort post ACE application. [de-identified] : DPM applied Knu knit post debridement  [de-identified] : Cleansed with NS\par Kerlix  [de-identified] : * All wounds interconnected after debridement.\par  Post debridement measurements: 6/6/0.2 - 0.3 \par Silver alginate EOD after first bandage change.  [TWNoteComboBox4] : Small [de-identified] : Erythema [de-identified] : None [de-identified] : None [de-identified] : 100% [de-identified] : No [TWNoteComboBox7] : Luzmaria [de-identified] : Ace wraps [de-identified] : Other [de-identified] : Small [de-identified] : other [de-identified] : None [de-identified] : None [de-identified] : >75% [de-identified] : No [TWNoteComboBox8] : Luzmaria [de-identified] : Ace wraps [de-identified] : Other [de-identified] : Small [de-identified] : Yes [de-identified] : other [de-identified] : None [de-identified] : None [de-identified] : >75% [de-identified] : No [de-identified] : Luzmaria [de-identified] : Ace wraps [de-identified] : Other

## 2022-01-31 ENCOUNTER — APPOINTMENT (OUTPATIENT)
Dept: WOUND CARE | Facility: HOSPITAL | Age: 57
End: 2022-01-31
Payer: MEDICARE

## 2022-01-31 ENCOUNTER — OUTPATIENT (OUTPATIENT)
Dept: OUTPATIENT SERVICES | Facility: HOSPITAL | Age: 57
LOS: 1 days | Discharge: HOME | End: 2022-01-31
Payer: MEDICARE

## 2022-01-31 VITALS
RESPIRATION RATE: 20 BRPM | TEMPERATURE: 97.7 F | HEIGHT: 73 IN | BODY MASS INDEX: 41.75 KG/M2 | OXYGEN SATURATION: 94 % | HEART RATE: 62 BPM | DIASTOLIC BLOOD PRESSURE: 68 MMHG | WEIGHT: 315 LBS | SYSTOLIC BLOOD PRESSURE: 123 MMHG

## 2022-01-31 DIAGNOSIS — E11.621 TYPE 2 DIABETES MELLITUS WITH FOOT ULCER: ICD-10-CM

## 2022-01-31 DIAGNOSIS — N18.6 END STAGE RENAL DISEASE: Chronic | ICD-10-CM

## 2022-01-31 DIAGNOSIS — Z89.421 ACQUIRED ABSENCE OF OTHER RIGHT TOE(S): Chronic | ICD-10-CM

## 2022-01-31 DIAGNOSIS — Z94.0 KIDNEY TRANSPLANT STATUS: Chronic | ICD-10-CM

## 2022-01-31 PROCEDURE — G0463: CPT

## 2022-01-31 PROCEDURE — 99213 OFFICE O/P EST LOW 20 MIN: CPT

## 2022-01-31 NOTE — ASSESSMENT
[Verbal] : Verbal [Written] : Written [Demo] : Demo [Patient] : Patient [Good - alert, interested, motivated] : Good - alert, interested, motivated [Demonstrates independently] : demonstrates independently [Dressing changes] : dressing changes [Foot Care] : foot care [Skin Care] : skin care [Signs and symptoms of infection] : sign and symptoms of infection [Nutrition] : nutrition [How and When to Call] : how and when to call [Off-loading] : off-loading [Compression Therapy] : compression therapy [Patient responsibility to plan of care] : patient responsibility to plan of care [Glycemic Control] : glycemic control [Stable] : stable [Home] : Home [Ambulatory] : Ambulatory [Not Applicable - Long Term Care/Home Health Agency] : Long Term Care/Home Health Agency: Not Applicable [] : No [FreeTextEntry2] : Infection prevention \par Wound care (dressing changes)\par Maintain optimal skin integrity to high pressure areas\par Compression therapy\par Elevation and low sodium compliance.\par Glycemic Control\par Weight reduction Strategies  [FreeTextEntry4] : Pt has been wearing orthotic boot and denies any complaints at this visit.\par Compression compliance discussed at this visit.\par F/u in 1 week

## 2022-01-31 NOTE — PLAN
[FreeTextEntry1] : Offload , continue wound care, pt to continue wearing CROW. PTR 1 week. Spent 20 minutes for patient care and medical decision making.\par

## 2022-01-31 NOTE — REVIEW OF SYSTEMS
[Fever] : no fever [Chills] : no chills [Eye Pain] : no eye pain [Loss Of Hearing] : no hearing loss [Shortness Of Breath] : no shortness of breath [Abdominal Pain] : no abdominal pain [Vomiting] : no vomiting [Skin Lesions] : no skin lesions [Skin Wound] : no skin wound [Anxiety] : no anxiety [Easy Bleeding] : no tendency for easy bleeding [FreeTextEntry5] : HTN,HLD [FreeTextEntry7] : 40.9 BMI , morbid obesity  [FreeTextEntry8] : Kidney Transplant  [FreeTextEntry9] : Associated Diabetic Charcot foot  [de-identified] : plantar right foot midfoot , lateral  DFU 2  , large area of peripheral callus build up , the ulcer is clean and granular [de-identified] : IDDM with neuropathy  [de-identified] : MARILUZ

## 2022-01-31 NOTE — PHYSICAL EXAM
[4 x 4] : 4 x 4  [Abdominal Pad] : Abdominal Pad [0] : left 0 [1+] : left 1+ [Skin Ulcer] : ulcer [Alert] : alert [Oriented to Person] : oriented to person [Oriented to Place] : oriented to place [Oriented to Time] : oriented to time [Calm] : calm [Varicose Veins Of Lower Extremities] : not present [] : not present [Purpura] : no purpura  [Petechiae] : no petechiae [Skin Induration] : no induration [de-identified] : adult WM, NAD, alert, Ox 3. [de-identified] : HTN, HLD [de-identified] : Diabetic Charcot right foot deformity [de-identified] : right foot plantar ulcer down to skin, subcutaneous tissue, fat, no soi  [de-identified] : Diabetic neuropathy  [FreeTextEntry1] : Right Plantar Foot [FreeTextEntry2] : 2.9 [FreeTextEntry3] : 2.4 [FreeTextEntry4] : 0.1 [de-identified] : serosanguineous [de-identified] : callused [de-identified] : 100% [de-identified] : Pt expressed comfort post ACE application. Circ/Neuromuscular functions WNL post application. [de-identified] : Alginate Ag [de-identified] : NSC\par DD\par  [TWNoteComboBox4] : Small [TWNoteComboBox5] : No [TWNoteComboBox6] : Diabetic [de-identified] : No [de-identified] : other [de-identified] : None [de-identified] : Ace wraps [de-identified] : 3x Weekly [de-identified] : Primary Dressing

## 2022-01-31 NOTE — REVIEW OF SYSTEMS
[Fever] : no fever [Chills] : no chills [Eye Pain] : no eye pain [Loss Of Hearing] : no hearing loss [Shortness Of Breath] : no shortness of breath [Abdominal Pain] : no abdominal pain [Vomiting] : no vomiting [Skin Lesions] : no skin lesions [Skin Wound] : no skin wound [Anxiety] : no anxiety [Easy Bleeding] : no tendency for easy bleeding [FreeTextEntry5] : HTN,HLD [FreeTextEntry7] : 40.9 BMI , morbid obesity  [FreeTextEntry8] : Kidney Transplant  [FreeTextEntry9] : Associated Diabetic Charcot foot  [de-identified] : plantar right foot midfoot , lateral  DFU 2  , large area of peripheral callus build up , the ulcer is clean and granular [de-identified] : IDDM with neuropathy  [de-identified] : MARILUZ

## 2022-01-31 NOTE — PHYSICAL EXAM
[4 x 4] : 4 x 4  [Abdominal Pad] : Abdominal Pad [0] : left 0 [1+] : left 1+ [Skin Ulcer] : ulcer [Alert] : alert [Oriented to Person] : oriented to person [Oriented to Place] : oriented to place [Oriented to Time] : oriented to time [Calm] : calm [Varicose Veins Of Lower Extremities] : not present [] : not present [Purpura] : no purpura  [Petechiae] : no petechiae [Skin Induration] : no induration [de-identified] : adult WM, NAD, alert, Ox 3. [de-identified] : HTN, HLD [de-identified] : Diabetic Charcot right foot deformity [de-identified] : right foot plantar ulcer down to skin, subcutaneous tissue, fat, no soi  [de-identified] : Diabetic neuropathy  [FreeTextEntry1] : Right Plantar Foot [FreeTextEntry2] : 2.9 [FreeTextEntry3] : 2.4 [FreeTextEntry4] : 0.1 [de-identified] : serosanguineous [de-identified] : callused [de-identified] : 100% [de-identified] : Pt expressed comfort post ACE application. Circ/Neuromuscular functions WNL post application. [de-identified] : Alginate Ag [de-identified] : NSC\par DD\par  [TWNoteComboBox4] : Small [TWNoteComboBox5] : No [TWNoteComboBox6] : Diabetic [de-identified] : No [de-identified] : other [de-identified] : None [de-identified] : Ace wraps [de-identified] : 3x Weekly [de-identified] : Primary Dressing

## 2022-01-31 NOTE — HISTORY OF PRESENT ILLNESS
[FreeTextEntry1] : DFU 2 plantar right foot , clean , granular, currently in a CROW, change dressing every other day, patient has a renal transplant.

## 2022-02-01 DIAGNOSIS — Z80.8 FAMILY HISTORY OF MALIGNANT NEOPLASM OF OTHER ORGANS OR SYSTEMS: ICD-10-CM

## 2022-02-01 DIAGNOSIS — Z86.16 PERSONAL HISTORY OF COVID-19: ICD-10-CM

## 2022-02-01 DIAGNOSIS — L97.412 NON-PRESSURE CHRONIC ULCER OF RIGHT HEEL AND MIDFOOT WITH FAT LAYER EXPOSED: ICD-10-CM

## 2022-02-01 DIAGNOSIS — Z85.828 PERSONAL HISTORY OF OTHER MALIGNANT NEOPLASM OF SKIN: ICD-10-CM

## 2022-02-01 DIAGNOSIS — E11.610 TYPE 2 DIABETES MELLITUS WITH DIABETIC NEUROPATHIC ARTHROPATHY: ICD-10-CM

## 2022-02-01 DIAGNOSIS — Z83.3 FAMILY HISTORY OF DIABETES MELLITUS: ICD-10-CM

## 2022-02-01 DIAGNOSIS — Z86.718 PERSONAL HISTORY OF OTHER VENOUS THROMBOSIS AND EMBOLISM: ICD-10-CM

## 2022-02-01 DIAGNOSIS — Z79.4 LONG TERM (CURRENT) USE OF INSULIN: ICD-10-CM

## 2022-02-01 DIAGNOSIS — Z79.82 LONG TERM (CURRENT) USE OF ASPIRIN: ICD-10-CM

## 2022-02-01 DIAGNOSIS — E11.621 TYPE 2 DIABETES MELLITUS WITH FOOT ULCER: ICD-10-CM

## 2022-02-01 DIAGNOSIS — Z89.421 ACQUIRED ABSENCE OF OTHER RIGHT TOE(S): ICD-10-CM

## 2022-02-01 DIAGNOSIS — Z87.81 PERSONAL HISTORY OF (HEALED) TRAUMATIC FRACTURE: ICD-10-CM

## 2022-02-01 DIAGNOSIS — E66.01 MORBID (SEVERE) OBESITY DUE TO EXCESS CALORIES: ICD-10-CM

## 2022-02-01 DIAGNOSIS — L84 CORNS AND CALLOSITIES: ICD-10-CM

## 2022-02-01 DIAGNOSIS — G47.33 OBSTRUCTIVE SLEEP APNEA (ADULT) (PEDIATRIC): ICD-10-CM

## 2022-02-01 DIAGNOSIS — Z89.422 ACQUIRED ABSENCE OF OTHER LEFT TOE(S): ICD-10-CM

## 2022-02-01 DIAGNOSIS — Z79.899 OTHER LONG TERM (CURRENT) DRUG THERAPY: ICD-10-CM

## 2022-02-01 DIAGNOSIS — E11.51 TYPE 2 DIABETES MELLITUS WITH DIABETIC PERIPHERAL ANGIOPATHY WITHOUT GANGRENE: ICD-10-CM

## 2022-02-01 DIAGNOSIS — Z94.0 KIDNEY TRANSPLANT STATUS: ICD-10-CM

## 2022-02-01 DIAGNOSIS — Z80.3 FAMILY HISTORY OF MALIGNANT NEOPLASM OF BREAST: ICD-10-CM

## 2022-02-01 DIAGNOSIS — Z86.73 PERSONAL HISTORY OF TRANSIENT ISCHEMIC ATTACK (TIA), AND CEREBRAL INFARCTION WITHOUT RESIDUAL DEFICITS: ICD-10-CM

## 2022-02-01 DIAGNOSIS — E78.5 HYPERLIPIDEMIA, UNSPECIFIED: ICD-10-CM

## 2022-02-03 ENCOUNTER — RX RENEWAL (OUTPATIENT)
Age: 57
End: 2022-02-03

## 2022-02-08 ENCOUNTER — INPATIENT (INPATIENT)
Facility: HOSPITAL | Age: 57
LOS: 6 days | Discharge: HOME | DRG: 205 | End: 2022-02-15
Attending: INTERNAL MEDICINE | Admitting: INTERNAL MEDICINE
Payer: MEDICARE

## 2022-02-08 VITALS
HEART RATE: 74 BPM | SYSTOLIC BLOOD PRESSURE: 152 MMHG | DIASTOLIC BLOOD PRESSURE: 83 MMHG | RESPIRATION RATE: 24 BRPM | HEIGHT: 73 IN | TEMPERATURE: 100 F | OXYGEN SATURATION: 88 % | WEIGHT: 300.05 LBS

## 2022-02-08 DIAGNOSIS — Z94.0 KIDNEY TRANSPLANT STATUS: Chronic | ICD-10-CM

## 2022-02-08 DIAGNOSIS — Z89.421 ACQUIRED ABSENCE OF OTHER RIGHT TOE(S): Chronic | ICD-10-CM

## 2022-02-08 DIAGNOSIS — N18.6 END STAGE RENAL DISEASE: Chronic | ICD-10-CM

## 2022-02-08 LAB
ALBUMIN SERPL ELPH-MCNC: 3.6 G/DL — SIGNIFICANT CHANGE UP (ref 3.3–5)
ALP SERPL-CCNC: 136 U/L — HIGH (ref 40–120)
ALT FLD-CCNC: 13 U/L — SIGNIFICANT CHANGE UP (ref 10–45)
ANION GAP SERPL CALC-SCNC: 9 MMOL/L — SIGNIFICANT CHANGE UP (ref 5–17)
APPEARANCE UR: CLEAR — SIGNIFICANT CHANGE UP
APTT BLD: 28.5 SEC — SIGNIFICANT CHANGE UP (ref 27.5–35.5)
AST SERPL-CCNC: 14 U/L — SIGNIFICANT CHANGE UP (ref 10–40)
BACTERIA # UR AUTO: NEGATIVE — SIGNIFICANT CHANGE UP
BASE EXCESS BLDV CALC-SCNC: 9.2 MMOL/L — HIGH (ref -2–2)
BASOPHILS # BLD AUTO: 0.07 K/UL — SIGNIFICANT CHANGE UP (ref 0–0.2)
BASOPHILS NFR BLD AUTO: 0.9 % — SIGNIFICANT CHANGE UP (ref 0–2)
BILIRUB SERPL-MCNC: 0.4 MG/DL — SIGNIFICANT CHANGE UP (ref 0.2–1.2)
BILIRUB UR-MCNC: NEGATIVE — SIGNIFICANT CHANGE UP
BLD GP AB SCN SERPL QL: NEGATIVE — SIGNIFICANT CHANGE UP
BUN SERPL-MCNC: 16 MG/DL — SIGNIFICANT CHANGE UP (ref 7–23)
CA-I SERPL-SCNC: 1.17 MMOL/L — SIGNIFICANT CHANGE UP (ref 1.15–1.33)
CALCIUM SERPL-MCNC: 9 MG/DL — SIGNIFICANT CHANGE UP (ref 8.4–10.5)
CHLORIDE BLDV-SCNC: 95 MMOL/L — LOW (ref 96–108)
CHLORIDE SERPL-SCNC: 96 MMOL/L — SIGNIFICANT CHANGE UP (ref 96–108)
CO2 BLDV-SCNC: 40 MMOL/L — HIGH (ref 22–26)
CO2 SERPL-SCNC: 31 MMOL/L — SIGNIFICANT CHANGE UP (ref 22–31)
COLOR SPEC: YELLOW — SIGNIFICANT CHANGE UP
CREAT SERPL-MCNC: 1.19 MG/DL — SIGNIFICANT CHANGE UP (ref 0.5–1.3)
DIFF PNL FLD: ABNORMAL
EOSINOPHIL # BLD AUTO: 0.14 K/UL — SIGNIFICANT CHANGE UP (ref 0–0.5)
EOSINOPHIL NFR BLD AUTO: 1.7 % — SIGNIFICANT CHANGE UP (ref 0–6)
EPI CELLS # UR: 1 /HPF — SIGNIFICANT CHANGE UP
GAS PNL BLDV: 132 MMOL/L — LOW (ref 136–145)
GAS PNL BLDV: SIGNIFICANT CHANGE UP
GAS PNL BLDV: SIGNIFICANT CHANGE UP
GLUCOSE BLDV-MCNC: 262 MG/DL — HIGH (ref 70–99)
GLUCOSE SERPL-MCNC: 252 MG/DL — HIGH (ref 70–99)
GLUCOSE UR QL: ABNORMAL
HCO3 BLDV-SCNC: 38 MMOL/L — HIGH (ref 22–29)
HCT VFR BLD CALC: 42.7 % — SIGNIFICANT CHANGE UP (ref 39–50)
HCT VFR BLDA CALC: 40 % — SIGNIFICANT CHANGE UP (ref 39–51)
HGB BLD CALC-MCNC: 13.4 G/DL — SIGNIFICANT CHANGE UP (ref 12.6–17.4)
HGB BLD-MCNC: 13.5 G/DL — SIGNIFICANT CHANGE UP (ref 13–17)
HYALINE CASTS # UR AUTO: 2 /LPF — SIGNIFICANT CHANGE UP (ref 0–2)
IMM GRANULOCYTES NFR BLD AUTO: 0.4 % — SIGNIFICANT CHANGE UP (ref 0–1.5)
INR BLD: 1.18 RATIO — HIGH (ref 0.88–1.16)
KETONES UR-MCNC: NEGATIVE — SIGNIFICANT CHANGE UP
LACTATE BLDV-MCNC: 1.1 MMOL/L — SIGNIFICANT CHANGE UP (ref 0.7–2)
LEUKOCYTE ESTERASE UR-ACNC: NEGATIVE — SIGNIFICANT CHANGE UP
LYMPHOCYTES # BLD AUTO: 1.28 K/UL — SIGNIFICANT CHANGE UP (ref 1–3.3)
LYMPHOCYTES # BLD AUTO: 15.8 % — SIGNIFICANT CHANGE UP (ref 13–44)
MCHC RBC-ENTMCNC: 27.8 PG — SIGNIFICANT CHANGE UP (ref 27–34)
MCHC RBC-ENTMCNC: 31.6 GM/DL — LOW (ref 32–36)
MCV RBC AUTO: 87.9 FL — SIGNIFICANT CHANGE UP (ref 80–100)
MONOCYTES # BLD AUTO: 0.77 K/UL — SIGNIFICANT CHANGE UP (ref 0–0.9)
MONOCYTES NFR BLD AUTO: 9.5 % — SIGNIFICANT CHANGE UP (ref 2–14)
NEUTROPHILS # BLD AUTO: 5.79 K/UL — SIGNIFICANT CHANGE UP (ref 1.8–7.4)
NEUTROPHILS NFR BLD AUTO: 71.7 % — SIGNIFICANT CHANGE UP (ref 43–77)
NITRITE UR-MCNC: NEGATIVE — SIGNIFICANT CHANGE UP
NRBC # BLD: 0 /100 WBCS — SIGNIFICANT CHANGE UP (ref 0–0)
PCO2 BLDV: 68 MMHG — HIGH (ref 42–55)
PH BLDV: 7.35 — SIGNIFICANT CHANGE UP (ref 7.32–7.43)
PH UR: 6 — SIGNIFICANT CHANGE UP (ref 5–8)
PLATELET # BLD AUTO: 255 K/UL — SIGNIFICANT CHANGE UP (ref 150–400)
PO2 BLDV: 30 MMHG — SIGNIFICANT CHANGE UP (ref 25–45)
POTASSIUM BLDV-SCNC: 4.9 MMOL/L — SIGNIFICANT CHANGE UP (ref 3.5–5.1)
POTASSIUM SERPL-MCNC: 4.6 MMOL/L — SIGNIFICANT CHANGE UP (ref 3.5–5.3)
POTASSIUM SERPL-SCNC: 4.6 MMOL/L — SIGNIFICANT CHANGE UP (ref 3.5–5.3)
PROT SERPL-MCNC: 7.2 G/DL — SIGNIFICANT CHANGE UP (ref 6–8.3)
PROT UR-MCNC: 100 — SIGNIFICANT CHANGE UP
PROTHROM AB SERPL-ACNC: 14 SEC — HIGH (ref 10.6–13.6)
RAPID RVP RESULT: SIGNIFICANT CHANGE UP
RBC # BLD: 4.86 M/UL — SIGNIFICANT CHANGE UP (ref 4.2–5.8)
RBC # FLD: 13.8 % — SIGNIFICANT CHANGE UP (ref 10.3–14.5)
RBC CASTS # UR COMP ASSIST: 18 /HPF — HIGH (ref 0–4)
RH IG SCN BLD-IMP: NEGATIVE — SIGNIFICANT CHANGE UP
SAO2 % BLDV: 56.2 % — LOW (ref 67–88)
SARS-COV-2 RNA SPEC QL NAA+PROBE: SIGNIFICANT CHANGE UP
SODIUM SERPL-SCNC: 136 MMOL/L — SIGNIFICANT CHANGE UP (ref 135–145)
SP GR SPEC: 1.02 — SIGNIFICANT CHANGE UP (ref 1.01–1.02)
UROBILINOGEN FLD QL: ABNORMAL
WBC # BLD: 8.08 K/UL — SIGNIFICANT CHANGE UP (ref 3.8–10.5)
WBC # FLD AUTO: 8.08 K/UL — SIGNIFICANT CHANGE UP (ref 3.8–10.5)
WBC UR QL: 3 /HPF — SIGNIFICANT CHANGE UP (ref 0–5)

## 2022-02-08 PROCEDURE — 76705 ECHO EXAM OF ABDOMEN: CPT | Mod: 26,RT

## 2022-02-08 PROCEDURE — 71275 CT ANGIOGRAPHY CHEST: CPT | Mod: 26,MA

## 2022-02-08 PROCEDURE — 93010 ELECTROCARDIOGRAM REPORT: CPT

## 2022-02-08 PROCEDURE — 71045 X-RAY EXAM CHEST 1 VIEW: CPT | Mod: 26

## 2022-02-08 PROCEDURE — 99285 EMERGENCY DEPT VISIT HI MDM: CPT

## 2022-02-08 RX ORDER — PIPERACILLIN AND TAZOBACTAM 4; .5 G/20ML; G/20ML
3.38 INJECTION, POWDER, LYOPHILIZED, FOR SOLUTION INTRAVENOUS ONCE
Refills: 0 | Status: COMPLETED | OUTPATIENT
Start: 2022-02-08 | End: 2022-02-08

## 2022-02-08 RX ORDER — SODIUM CHLORIDE 9 MG/ML
1000 INJECTION, SOLUTION INTRAVENOUS ONCE
Refills: 0 | Status: COMPLETED | OUTPATIENT
Start: 2022-02-08 | End: 2022-02-08

## 2022-02-08 RX ORDER — ACETAMINOPHEN 500 MG
975 TABLET ORAL ONCE
Refills: 0 | Status: COMPLETED | OUTPATIENT
Start: 2022-02-08 | End: 2022-02-08

## 2022-02-08 RX ADMIN — Medication 975 MILLIGRAM(S): at 15:03

## 2022-02-08 RX ADMIN — SODIUM CHLORIDE 1000 MILLILITER(S): 9 INJECTION, SOLUTION INTRAVENOUS at 15:03

## 2022-02-08 RX ADMIN — PIPERACILLIN AND TAZOBACTAM 200 GRAM(S): 4; .5 INJECTION, POWDER, LYOPHILIZED, FOR SOLUTION INTRAVENOUS at 23:55

## 2022-02-08 NOTE — ED ADULT NURSE REASSESSMENT NOTE - NS ED NURSE REASSESS COMMENT FT1
Received report from SIERRA Durán. Pt is awake and alert, resting comfortably in stretcher, speaking in full coherent sentences. Pt denies CP, SOB, fevers, chills, N/V/D, HA, vision changes. Call bell within reach, comfort & safety provided.

## 2022-02-08 NOTE — CONSULT NOTE ADULT - SUBJECTIVE AND OBJECTIVE BOX
ACUTE CARE SURGERY CONSULT NOTE  --------------------------------------------------------------------------------------------    HPI:   56 year old male with PMH of ESRD s/p LDRT (, current baseline Cr 1.2) DM type 1 (on insulin pump), HTN, restrictive lung disease and CARMEN (on nocturnal NIPPV), DVT (previously on Eliquis), CVA () with residual left sided numbness, hypoxic respiratory failure due to Norovirus infection in  requiring intubation, squamous cell carcinoma, osteomyelitis s/p toe amputation, presenting with hypoxia.    He checked his oxygen saturation at home yesterday and it was 70%. Reports that his baseline saturation is low 90s. Today it was 80% so he presented to the ED. Approximately 1.5 weeks ago on  he had severe RUQ pain associated with nausea, subjective fevers, and chills. The pain resolved but he has had persistently poor appetite and fatigue, though he denies any pain or nausea in the past week. He also reports that his glucose has been over 200 today despite not eating anything and using his insulin pump. Denies any chest pain, SOB, cough, dysuria, hematuria. His urine output has been normal.    In the ED he was afebrile, HR 64, /66, WBC 8, Hgb 13.5, Cr 1.1, bilirubin 0.4, Alk Phos 136, AST/ALT 14-30, lactate 1.1, CT shows right pleural effusion and atelectasis, air in the gallbladder with surrounding inflammation. US shows distended gallbladder with diffuse wall thickening, pericholecystic inflammation, and intraluminal air.      PAST MEDICAL & SURGICAL HISTORY:  - Living donor renal transplant ( at [facility])  - Restrictive lung disease (was  during )  - Hypoxic respiratory failure due to Norovirus infection requiring intubation ()  - Diabetes mellitus, type 1 on insulin pump  - Hypertension  - Hyperlipidemia  - Diabetic peripheral neuropathy  - CVA with left sided paresthesias  - Squamous cell carcinoma of the left ear, left arm, nose  - DVT (s/p course of Eliquis)  - Left 4th toe osteomyelitis s/p amputation  - Vasectomy  - Right foot fracture s/p ORIF  - Right second ray amputation    FAMILY HISTORY:  - Diabetes mellitus  - Skin cancer    SOCIAL HISTORY:  - Denies smoking history  - Drinks alcohol socially     ALLERGIES:   - No Known Allergies    HOME MEDICATIONS:  - Tacrolimus  - Azathioprine  - Metoprolol  - Losartan  - Famotidine  - Gabapentin  - Bactrim  - Clonidine  - Hydralazine  - Lovastatin  - Insulin pump    --------------------------------------------------------------------------------------------    Vitals:   T(C): 36.8 (22 @ 20:00), Max: 37.5 (22 @ 14:07)  HR: 64 (22 @ 23:00) (64 - 80)  BP: 177/66 (22 @ 23:00) (119/74 - 177/66)  RR: 18 (22 @ 23:00) (18 - 24)  SpO2: 100% (22 @ 23:00) (88% - 100%)    Height (cm): 185.4 ( @ :07)  Weight (kg): 136.1 (:07)  BMI (kg/m2): 39.6 (:07)  BSA (m2): 2.56 ( @ :07)    PHYSICAL EXAM:  General: Alert, NAD  Neuro: A+Ox3  Psych: Mood appropriate  HEENT: NC/AT, no asymmetry, no scleral icterus  Cardio: RRR  Resp: Unlabored breathing on 3.5 liters NC  GI/Abd: Soft, minimal RUQ tenderness, not distended, no tenderness over graft  Ext: Full ROM, RLE edematous  Skin: Warm, dry    --------------------------------------------------------------------------------------------    LABS  CBC ( 15:06)                              13.5                           8.08    )----------------(  255        71.7  % Neutrophils, 15.8  % Lymphocytes, ANC: 5.79                                42.7      BMP ( @ 15:06)             136     |  96      |  16    		Ca++ --      Ca 9.0                ---------------------------------( 252<H>		Mg --                 4.6     |  31      |  1.19  			Ph --        LFTs ( @ 15:06)      TPro 7.2 / Alb 3.6 / TBili 0.4 / DBili -- / AST 14 / ALT 13 / AlkPhos 136<H>    Coags ( @ 15:06)  aPTT 28.5 / INR 1.18<H> / PT 14.0<H>    VBG ( 14:58)     7.35 / 68<H> / 30 / 38<H> / 9.2<H> / 56.2<L>%     Lactate: 1.1    --------------------------------------------------------------------------------------------    MICROBIOLOGY  Urinalysis ( @ 17:38):     Color: Yellow / Appearance: Clear / S.023 / pH: 6.0 / Gluc: 100 mg/dL<!> / Ketones: Negative / Bili: Negative / Urobili: 3 mg/dL<!> / Protein :100 / Nitrites: Negative / Leuk.Est: Negative / RBC: 18<H> / WBC: 3 / Sq Epi:  / Non Sq Epi: 1 / Bacteria Negative       --------------------------------------------------------------------------------------------    IMAGING  CT Angio Chest PE Protocol w/ IV Cont (22 @ 18:36)    FINDINGS:    PULMONARY ANGIOGRAM: No main, central, lobar, segmental or subsegmental   pulmonary embolus.    LYMPH NODES: No mediastinal and/or hilar lymphadenopathy.    HEART/VASCULATURE: The heart size is normal. No pericardial effusion.   Coronary artery calcifications.    AIRWAYS/LUNGS/PLEURA: Central airways are patent. Right lower lobe   partial passive atelectasis. Right middle lobe, lingular and left lower   lobe linear atelectasis. Mild/moderate right pleural effusion. No   pneumothorax.    UPPER ABDOMEN: Partially imaged mildly distended gallbladder containing   hyperdensity, may represents sludge. Foci of nondependent air is also   noted likely within the gallbladder lumen. Trace pericholecystic fat   stranding. The right hemidiaphragm is elevated.    BONES/SOFT TISSUES: Degenerative changes of the bones. Left anterior   chest wall subcutaneous tissue cardiac loop recorder.    Heterogeneity of the partially imaged thyroid gland containing tiny   hypodense nodules and calcifications as on the prior study.    IMPRESSION:    No pulmonary embolus.    Partially imaged distended gallbladder containing sludge with trace   pericholecystic fat stranding. A few foci of nondependent air likely   within the gallbladder lumen. Findings are worrisome for acute   cholecystitis. Recommend further evaluation with dedicated right upper   quadrant sonogram.    Small to moderate right pleural effusion with adjacent right lower lobe   partial compressive atelectasis.      US Abdomen Upper Quadrant Right (22 @ 20:44)    FINDINGS:    Liver: Within normal limits. Measures 16.1 cm in length. Smooth hepatic   surface contour. Main portal vein normal directional flow.  Bile ducts: Normal caliber. Common bile duct measures 5 mm.  Gallbladder: Diffuse gallbladder wall thickening measures up to 5 mm.   Mild distention. Air within gallbladder lumen and possibly within the   wall. Contains sludge. Sonographer notes indicate a negative sonographic   Ashley's sign.  Pancreas: Obscured by bowel gas shadowing.  Right kidney: 8.4 cm. Fullness of the collecting system.. Atrophic and   echogenic compatible with endstage renal disease  Ascites: None.  IVC: Visualized portions are within normal limits.  Right iliac fossa renal transplant measures 15 cm in length. No   hydronephrosis or perinephric collection    IMPRESSION:    Mildly distended gallbladder with diffuse wall thickening containing   intraluminal air and possible air within the wall. Pericholecystic   inflammation suggested on CT. Despite negative sonographic Ashley's sign,   this constellation of findings is suspicious for emphysematous   cholecystitis. Recommend surgical consultation.

## 2022-02-08 NOTE — ED PROVIDER NOTE - PHYSICAL EXAMINATION
GENERAL: Awake, alert, NAD  HEENT: NC/AT, moist mucous membranes  LUNGS: decreased breath sounds bilaterally, +tachypneic  CARDIAC: RRR, no m/r/g  ABDOMEN: Soft, normal BS, non tender, non distended, no rebound, no guarding  EXT: +lower extremity edema, R>L, right foot bandaged secondary to diabetic ulcer, no calf tenderness, 2+ DP pulses bilaterally, no deformities.  NEURO: A&Ox3. Moving all extremities.  SKIN: Warm and dry. No rash.  PSYCH: Normal affect.

## 2022-02-08 NOTE — ED PROVIDER NOTE - IV ALTEPLASE DOOR HIDDEN
Nursing Assessment     Pt had uneventful shift. Pt visible in the milieu, however withdrawn from peers. Pt did attend group. Throughout the shift, pt appeared calm, cooperative and appropriate. Pt denies SI, SIB, HI and AVH. Pt agreeable to safe behaviors on the unit. Pt ate 75% of dinner. No observed concerns with appetite. Pt endorses adequate sleep at night Pt denies any medical concerns. Pt took medications without issue. PRN valium 5mg administered for MSSA score of 8 at 1600. MSSA score 5 at 2000. COWS score 9 at 1600. COWS score 7 at 2000. PRN trazodone administered x2 to target sleep. Pt endorsed effectiveness. Pt educated on plan of care and encouraged to continue working toward goals.    Pt remains on Withdrawal precautions. Will continue to monitor and support.        show

## 2022-02-08 NOTE — ED PROVIDER NOTE - PROGRESS NOTE DETAILS
pettet attending- patient with findings of possible acute cholecystitis on cta chest (no pe), discussed with patient will obtain ruq sono, does have ruq ttp on my exam, surg consulted will Follow up with sono results and evaluate patient. patient agreeable with plan Contacted transplant surgery as requested by surgery. As per transplant team, given acute cholecystitis, patient will be better served under general surgery.

## 2022-02-08 NOTE — ED PROVIDER NOTE - CLINICAL SUMMARY MEDICAL DECISION MAKING FREE TEXT BOX
55 y/o M with PMH HTN, HLD, DM, CKD, CVA, DVT, chronic lung disease on 2L intermittently, renal transplant on tacrolimus presents to the ED c/o hypoxia. Patient hypoxic to 88% in the ED and febrile to 101.5F. Concern for URI vs other cause of fever. Plan for labs, CXR, EKG. Likely CTA in setting of hypoxia to evaluate for PE. If RVP negative, likely dose abx and admit to pend cx. Orlando Bernstein, DO PGY3 55 y/o M with PMH HTN, HLD, DM, CKD, CVA, DVT, chronic lung disease on 2L intermittently, renal transplant on tacrolimus presents to the ED c/o hypoxia. Patient hypoxic to 88% in the ED and febrile to 101.5F. Concern for URI vs other cause of fever. Plan for labs, CXR, EKG. Likely CTA in setting of hypoxia to evaluate for PE. If RVP negative, likely dose abx and admit to pend cx. Orlando Bernstein DO PGY3    Attending Sagar: 55 y/o M of HTN, HLD, DM, CKD s/p kidney transplant on tacro, CVA, DVT, lung disease (9/11 related) presenting w/ cough and hypoxia. Seen in green. Reports feeling sick for the past 2 days. Having increased cough and generalized malaise. Monitors his oxygen at home and saw sats had intermittently been dropping into the 70s and 80s. This caused him to become concerned which prompted him to come to ED. Pt well appearing on exam, no acute distress. Lungs grossly clear. Mild tachypnea. HR regular. Abd nondistended/soft/nontender. Pt w/ cough and weakness w/ new hypoxia. Require NC to maintain sats in mid 90s. Has hx of lung disease however given new complaints, will eval for acute pulm pathology. Will eval for PE. Possible PNA, viral vs. bacterial. Pt will require admission. Plan for labs, meds, imaging, EKG. Will reassess the need for additional interventions as clinically warranted.

## 2022-02-08 NOTE — ED PROVIDER NOTE - NS ED ROS FT
CONST: no fevers, no chills  EYES: no pain, no vision changes  ENT: no sore throat, no ear pain, no change in hearing  CV: no chest pain, + leg swelling (chronic)  RESP: no shortness of breath, + cough  ABD: no abdominal pain, no nausea, no vomiting, no diarrhea  : no dysuria, no flank pain, no hematuria  MSK: no back pain, no extremity pain  NEURO: no headache or additional neurologic complaints  HEME: no easy bleeding  SKIN:  no rash

## 2022-02-08 NOTE — ED PROVIDER NOTE - ATTENDING CONTRIBUTION TO CARE
Attending Sagar: I performed a history and physical exam of the patient and discussed their management with the resident/fellow/ACP/student. I have reviewed the resident/fellow/ACP/student note and agree with the documented findings and plan of care, except as noted. I have personally performed a substantive portion of the visit including all aspects of the medical decision making. My medical decision making and observations are found above. Please refer to any progress notes for updates on clinical course.

## 2022-02-08 NOTE — ED ADULT TRIAGE NOTE - CHIEF COMPLAINT QUOTE
cough, hypoxia, pulse ox as low as 80s on RA- denies sob or diff breathing, verbalizes feeling tired and weak.   pt covid pos at the end of december 2021, not vaccinated

## 2022-02-08 NOTE — ED PROCEDURE NOTE - PROCEDURE ADDITIONAL DETAILS
Emergency Department Focused Ultrasound performed at patient's bedside for educational purposes.     The study will have a follow up study performed or was performed in the direct supervision of an ultrasound trained attending.    ~Aren Gamboa D.O. -ED Attending

## 2022-02-08 NOTE — CONSULT NOTE ADULT - ASSESSMENT
56 year old male with PMH of ESRD s/p LDRT, DM type 1, HTN, restrictive lung disease and CARMEN, DVT, CVA, squamous cell carcinoma, osteomyelitis, presenting with cholecystitis with intraluminal and intramural air in the gallbladder.    - Given the patient's comorbidities, duration of symptoms, and imaging findings, would not be a candidate for cholecystectomy at this time  - Recommend IV antibiotics, medical/pulmonary optimization  - If decompensates would require urgent percutaneous cholecystostomy with IR  - Will follow  - Discussed with surgical attending, Dr. Arriaza    Acute Care Surgery  Pager [phone] 56 year old male with PMH of ESRD s/p LDRT, DM type 1, HTN, restrictive lung disease and CARMEN, DVT, CVA, squamous cell carcinoma, osteomyelitis, presenting with cholecystitis with intraluminal and intramural air in the gallbladder.    - Given the patient's comorbidities, duration of symptoms, and imaging findings, would not be a candidate for cholecystectomy at this time  - Recommend IV antibiotics, medical/pulmonary optimization  - If decompensates would require urgent percutaneous cholecystostomy with IR  - Recommend transplant nephrology consult (follows with Dr. Chi)  - Will follow, please call with any questions  - Discussed with surgical attending, Dr. Arriaza    Acute Care Surgery  Pager [phone]

## 2022-02-08 NOTE — ED ADULT NURSE NOTE - NS ED PATIENT SAFETY CONCERN
Last filled 12/24/2020 and will postdate to 1/23/2021 - A postdated prescription has been sent in.     No

## 2022-02-08 NOTE — ED ADULT NURSE NOTE - OBJECTIVE STATEMENT
56 yr old male with past history of lung damage from 9/11, diabetic with pump, kidney transplant in 2013 and receiving would care on r foot for diabetic ulcer as well as past stroke where l side is slightly weaker and numb but able to walk. pt last had covid in dec 2021 and is unvaccinated. on assessment a and o x 3 lungs clear abd soft non tender no swelling in extremities, pt sleeps with o2 but not usually during day. normally he said he is in mid 90s but today he is 88%. pt has a fever of 101 rectally

## 2022-02-08 NOTE — ED PROVIDER NOTE - NSICDXPASTMEDICALHX_GEN_ALL_CORE_FT
PAST MEDICAL HISTORY:  CKD (chronic kidney disease) Renal Transplant 2013 at [facility]    CVA (cerebral vascular accident) [place] Stroke  low L body sensation  low R face sensation  decreased peripheral vision  poor balance    Diabetes mellitus Type 1 on insulin pump    Diabetic peripheral neuropathy     History of DVT (deep vein thrombosis) s/p eliquis    Hyperlipidemia     Hypertension     Obesity (BMI 30-39.9)     Recurrent squamous cell carcinoma of skin nose, L arm    Squamous cell carcinoma of skin of left ear

## 2022-02-08 NOTE — ED PROVIDER NOTE - OBJECTIVE STATEMENT
55 y/o M with PMH HTN, HLD, DM, CKD, CVA, DVT, chronic lung disease on 2L intermittently, renal transplant on tacrolimus presents to the ED c/o hypoxia. Patient reports he has been taking his pulse ox at home and noticed values in the 70s-80s, his normal saturation is low 90s. He denies feeling SOB and has been able to walk without difficulty. Denies CP. No nausea, vomiting, fever, or chills. Does endorse dry cough. He was diagnosed with COVID in 12/2021 but reports normal recovery. Reports chronic swelling in his RLE.

## 2022-02-09 DIAGNOSIS — E11.9 TYPE 2 DIABETES MELLITUS WITHOUT COMPLICATIONS: ICD-10-CM

## 2022-02-09 DIAGNOSIS — K81.0 ACUTE CHOLECYSTITIS: ICD-10-CM

## 2022-02-09 DIAGNOSIS — I10 ESSENTIAL (PRIMARY) HYPERTENSION: ICD-10-CM

## 2022-02-09 DIAGNOSIS — J96.21 ACUTE AND CHRONIC RESPIRATORY FAILURE WITH HYPOXIA: ICD-10-CM

## 2022-02-09 DIAGNOSIS — Z29.9 ENCOUNTER FOR PROPHYLACTIC MEASURES, UNSPECIFIED: ICD-10-CM

## 2022-02-09 DIAGNOSIS — N18.2 CHRONIC KIDNEY DISEASE, STAGE 2 (MILD): ICD-10-CM

## 2022-02-09 LAB
CULTURE RESULTS: NO GROWTH — SIGNIFICANT CHANGE UP
GLUCOSE BLDC GLUCOMTR-MCNC: 112 MG/DL — HIGH (ref 70–99)
GLUCOSE BLDC GLUCOMTR-MCNC: 113 MG/DL — HIGH (ref 70–99)
GLUCOSE BLDC GLUCOMTR-MCNC: 123 MG/DL — HIGH (ref 70–99)
GLUCOSE BLDC GLUCOMTR-MCNC: 127 MG/DL — HIGH (ref 70–99)
GLUCOSE BLDC GLUCOMTR-MCNC: 131 MG/DL — HIGH (ref 70–99)
GLUCOSE BLDC GLUCOMTR-MCNC: 140 MG/DL — HIGH (ref 70–99)
GLUCOSE BLDC GLUCOMTR-MCNC: 154 MG/DL — HIGH (ref 70–99)
GLUCOSE BLDC GLUCOMTR-MCNC: 61 MG/DL — LOW (ref 70–99)
SPECIMEN SOURCE: SIGNIFICANT CHANGE UP
TACROLIMUS SERPL-MCNC: 7.8 NG/ML — SIGNIFICANT CHANGE UP

## 2022-02-09 PROCEDURE — 99223 1ST HOSP IP/OBS HIGH 75: CPT

## 2022-02-09 PROCEDURE — 78227 HEPATOBIL SYST IMAGE W/DRUG: CPT | Mod: 26

## 2022-02-09 RX ORDER — AZATHIOPRINE 100 MG/1
1 TABLET ORAL
Qty: 0 | Refills: 0 | DISCHARGE

## 2022-02-09 RX ORDER — ASPIRIN/CALCIUM CARB/MAGNESIUM 324 MG
81 TABLET ORAL DAILY
Refills: 0 | Status: DISCONTINUED | OUTPATIENT
Start: 2022-02-09 | End: 2022-02-15

## 2022-02-09 RX ORDER — GLUCAGON INJECTION, SOLUTION 0.5 MG/.1ML
1 INJECTION, SOLUTION SUBCUTANEOUS ONCE
Refills: 0 | Status: DISCONTINUED | OUTPATIENT
Start: 2022-02-09 | End: 2022-02-15

## 2022-02-09 RX ORDER — PIPERACILLIN AND TAZOBACTAM 4; .5 G/20ML; G/20ML
3.38 INJECTION, POWDER, LYOPHILIZED, FOR SOLUTION INTRAVENOUS ONCE
Refills: 0 | Status: COMPLETED | OUTPATIENT
Start: 2022-02-09 | End: 2022-02-09

## 2022-02-09 RX ORDER — INSULIN GLARGINE 100 [IU]/ML
50 INJECTION, SOLUTION SUBCUTANEOUS
Refills: 0 | Status: DISCONTINUED | OUTPATIENT
Start: 2022-02-09 | End: 2022-02-11

## 2022-02-09 RX ORDER — ACETAMINOPHEN 500 MG
650 TABLET ORAL EVERY 6 HOURS
Refills: 0 | Status: DISCONTINUED | OUTPATIENT
Start: 2022-02-09 | End: 2022-02-15

## 2022-02-09 RX ORDER — HYDRALAZINE HCL 50 MG
25 TABLET ORAL THREE TIMES A DAY
Refills: 0 | Status: DISCONTINUED | OUTPATIENT
Start: 2022-02-09 | End: 2022-02-15

## 2022-02-09 RX ORDER — DEXTROSE 50 % IN WATER 50 %
25 SYRINGE (ML) INTRAVENOUS ONCE
Refills: 0 | Status: COMPLETED | OUTPATIENT
Start: 2022-02-09 | End: 2022-02-09

## 2022-02-09 RX ORDER — SODIUM CHLORIDE 9 MG/ML
1000 INJECTION, SOLUTION INTRAVENOUS
Refills: 0 | Status: DISCONTINUED | OUTPATIENT
Start: 2022-02-09 | End: 2022-02-15

## 2022-02-09 RX ORDER — ONDANSETRON 8 MG/1
4 TABLET, FILM COATED ORAL EVERY 8 HOURS
Refills: 0 | Status: DISCONTINUED | OUTPATIENT
Start: 2022-02-09 | End: 2022-02-15

## 2022-02-09 RX ORDER — LOSARTAN POTASSIUM 100 MG/1
25 TABLET, FILM COATED ORAL DAILY
Refills: 0 | Status: DISCONTINUED | OUTPATIENT
Start: 2022-02-09 | End: 2022-02-15

## 2022-02-09 RX ORDER — ATORVASTATIN CALCIUM 80 MG/1
10 TABLET, FILM COATED ORAL AT BEDTIME
Refills: 0 | Status: DISCONTINUED | OUTPATIENT
Start: 2022-02-09 | End: 2022-02-15

## 2022-02-09 RX ORDER — SODIUM CHLORIDE 9 MG/ML
1000 INJECTION, SOLUTION INTRAVENOUS
Refills: 0 | Status: DISCONTINUED | OUTPATIENT
Start: 2022-02-09 | End: 2022-02-11

## 2022-02-09 RX ORDER — PIPERACILLIN AND TAZOBACTAM 4; .5 G/20ML; G/20ML
3.38 INJECTION, POWDER, LYOPHILIZED, FOR SOLUTION INTRAVENOUS EVERY 8 HOURS
Refills: 0 | Status: DISCONTINUED | OUTPATIENT
Start: 2022-02-09 | End: 2022-02-10

## 2022-02-09 RX ORDER — PIPERACILLIN AND TAZOBACTAM 4; .5 G/20ML; G/20ML
3.38 INJECTION, POWDER, LYOPHILIZED, FOR SOLUTION INTRAVENOUS EVERY 8 HOURS
Refills: 0 | Status: DISCONTINUED | OUTPATIENT
Start: 2022-02-09 | End: 2022-02-09

## 2022-02-09 RX ORDER — DEXTROSE 50 % IN WATER 50 %
12.5 SYRINGE (ML) INTRAVENOUS ONCE
Refills: 0 | Status: DISCONTINUED | OUTPATIENT
Start: 2022-02-09 | End: 2022-02-15

## 2022-02-09 RX ORDER — DEXTROSE 50 % IN WATER 50 %
25 SYRINGE (ML) INTRAVENOUS ONCE
Refills: 0 | Status: DISCONTINUED | OUTPATIENT
Start: 2022-02-09 | End: 2022-02-15

## 2022-02-09 RX ORDER — TACROLIMUS 5 MG/1
1.5 CAPSULE ORAL
Refills: 0 | Status: DISCONTINUED | OUTPATIENT
Start: 2022-02-09 | End: 2022-02-10

## 2022-02-09 RX ORDER — LANOLIN ALCOHOL/MO/W.PET/CERES
3 CREAM (GRAM) TOPICAL AT BEDTIME
Refills: 0 | Status: DISCONTINUED | OUTPATIENT
Start: 2022-02-09 | End: 2022-02-15

## 2022-02-09 RX ORDER — DEXTROSE 50 % IN WATER 50 %
15 SYRINGE (ML) INTRAVENOUS ONCE
Refills: 0 | Status: DISCONTINUED | OUTPATIENT
Start: 2022-02-09 | End: 2022-02-15

## 2022-02-09 RX ORDER — GABAPENTIN 400 MG/1
300 CAPSULE ORAL
Refills: 0 | Status: DISCONTINUED | OUTPATIENT
Start: 2022-02-09 | End: 2022-02-15

## 2022-02-09 RX ORDER — ENOXAPARIN SODIUM 100 MG/ML
40 INJECTION SUBCUTANEOUS EVERY 12 HOURS
Refills: 0 | Status: DISCONTINUED | OUTPATIENT
Start: 2022-02-09 | End: 2022-02-15

## 2022-02-09 RX ORDER — METOPROLOL TARTRATE 50 MG
75 TABLET ORAL
Refills: 0 | Status: DISCONTINUED | OUTPATIENT
Start: 2022-02-09 | End: 2022-02-15

## 2022-02-09 RX ORDER — LACTOBACILLUS ACIDOPHILUS 100MM CELL
1 CAPSULE ORAL THREE TIMES A DAY
Refills: 0 | Status: DISCONTINUED | OUTPATIENT
Start: 2022-02-09 | End: 2022-02-15

## 2022-02-09 RX ORDER — FAMOTIDINE 10 MG/ML
20 INJECTION INTRAVENOUS DAILY
Refills: 0 | Status: DISCONTINUED | OUTPATIENT
Start: 2022-02-09 | End: 2022-02-15

## 2022-02-09 RX ADMIN — Medication 25 MILLIGRAM(S): at 22:13

## 2022-02-09 RX ADMIN — Medication 1 TABLET(S): at 22:12

## 2022-02-09 RX ADMIN — TACROLIMUS 1.5 MILLIGRAM(S): 5 CAPSULE ORAL at 18:59

## 2022-02-09 RX ADMIN — PIPERACILLIN AND TAZOBACTAM 25 GRAM(S): 4; .5 INJECTION, POWDER, LYOPHILIZED, FOR SOLUTION INTRAVENOUS at 17:16

## 2022-02-09 RX ADMIN — LOSARTAN POTASSIUM 25 MILLIGRAM(S): 100 TABLET, FILM COATED ORAL at 12:57

## 2022-02-09 RX ADMIN — INSULIN GLARGINE 50 UNIT(S): 100 INJECTION, SOLUTION SUBCUTANEOUS at 19:05

## 2022-02-09 RX ADMIN — Medication 1 TABLET(S): at 17:14

## 2022-02-09 RX ADMIN — Medication 75 MILLIGRAM(S): at 22:12

## 2022-02-09 RX ADMIN — Medication 81 MILLIGRAM(S): at 12:57

## 2022-02-09 RX ADMIN — Medication 0.1 MILLIGRAM(S): at 19:01

## 2022-02-09 RX ADMIN — SODIUM CHLORIDE 75 MILLILITER(S): 9 INJECTION, SOLUTION INTRAVENOUS at 13:01

## 2022-02-09 RX ADMIN — ENOXAPARIN SODIUM 40 MILLIGRAM(S): 100 INJECTION SUBCUTANEOUS at 19:02

## 2022-02-09 RX ADMIN — Medication 25 MILLIGRAM(S): at 17:14

## 2022-02-09 RX ADMIN — PIPERACILLIN AND TAZOBACTAM 200 GRAM(S): 4; .5 INJECTION, POWDER, LYOPHILIZED, FOR SOLUTION INTRAVENOUS at 10:41

## 2022-02-09 RX ADMIN — ATORVASTATIN CALCIUM 10 MILLIGRAM(S): 80 TABLET, FILM COATED ORAL at 22:13

## 2022-02-09 RX ADMIN — Medication 25 MILLILITER(S): at 06:01

## 2022-02-09 RX ADMIN — GABAPENTIN 300 MILLIGRAM(S): 400 CAPSULE ORAL at 19:00

## 2022-02-09 RX ADMIN — Medication 3 MILLIGRAM(S): at 22:13

## 2022-02-09 RX ADMIN — FAMOTIDINE 20 MILLIGRAM(S): 10 INJECTION INTRAVENOUS at 12:57

## 2022-02-09 NOTE — ED ADULT NURSE REASSESSMENT NOTE - NS ED NURSE REASSESS COMMENT FT1
pt is awake and alert, resting comfortably in stretcher, speaking in full coherent sentences. Pt endorses no pain or discomfort at this time. Call bell within reach, comfort & safety provided. Will continue to monitor.

## 2022-02-09 NOTE — H&P ADULT - NSHPREVIEWOFSYSTEMS_GEN_ALL_CORE
Review of Systems:   CONSTITUTIONAL: +chills. No fever, weight loss  EYES: No eye pain, visual disturbances, or discharge  ENMT:  No difficulty hearing, tinnitus, vertigo; No sinus or throat pain  RESPIRATORY: +SOB. No cough, wheezing, chills or hemoptysis  CARDIOVASCULAR: No chest pain, palpitations, dizziness, or new leg swelling  GASTROINTESTINAL: +abd pain +diarrhea +nausea. No vomiting, or hematemesis;. No melena or hematochezia.  GENITOURINARY: No dysuria, frequency, hematuria, or incontinence  NEUROLOGICAL: No headaches, memory loss, loss of strength, numbness, or tremors  SKIN: No itching, burning, rashes, or lesions   ENDOCRINE: No heat or cold intolerance; No hair loss  MUSCULOSKELETAL: No joint pain or swelling; No muscle, back pain  PSYCHIATRIC: No depression, anxiety, mood swings, or difficulty sleeping  HEME/LYMPH: No easy bruising, or bleeding gums

## 2022-02-09 NOTE — H&P ADULT - HISTORY OF PRESENT ILLNESS
57 yo M PMH of ESRD s/p LDRT (2013, current baseline Cr 1.2) DM type 1 w/ on insulin pump, HTN, restrictive lung disease and CARMEN (on nocturnal NIPPV), hx of DVT (previously on Eliquis), CVA (2015) with residual left sided numbness, hx of WTC exposure with chronic hypoxic respiratory failure on 2L O2 PRN, COVID19 infection 12/2021 presents for 3-4 days of hypoxia, later followed by ruq abd discomfort. He states he began experiencing SOB 4 days prior to admission, noticed his o2 sat at home was 70s-90s when it is usually in the 90s. Two days later, he began experiencing on and off 7/10, ruq discomfort, described as sharp sensation, nonradiating, associated with nausea and decreased appetite and chills and loose diarrhea (3x/day). He tolerated the pain himself and did not take anything for it, but presented to the ED because of persistent hypoxia.  Denies new LE edema, cp, documented fevers, cough, sore throat.    In the ED VSS, O2 92% on 5L, hypertensive to 160s/80s, afebrile  Meds received: zosyn iv, 1 L LR, D50 amp x 1 for hypoglycemia in the ED , tylenol 975 mg po

## 2022-02-09 NOTE — PROGRESS NOTE ADULT - SUBJECTIVE AND OBJECTIVE BOX
Surgery Progress Note     Subjective/24hour Events: Patient seen and examined at the bedside this morning. No acute events overnight. Pain controlled.     Vital Signs:  Vital Signs Last 24 Hrs  T(C): 37.2 (09 Feb 2022 04:56), Max: 37.8 (09 Feb 2022 02:00)  T(F): 98.9 (09 Feb 2022 04:56), Max: 100 (09 Feb 2022 02:00)  HR: 74 (09 Feb 2022 04:56) (64 - 80)  BP: 163/86 (09 Feb 2022 04:56) (119/74 - 177/66)  BP(mean): --  RR: 18 (09 Feb 2022 04:56) (16 - 24)  SpO2: 92% (09 Feb 2022 04:56) (88% - 100%)        I&O's Detail    General: Alert, NAD  Neuro: A+Ox3  Psych: Mood appropriate  HEENT: NC/AT, no asymmetry, no scleral icterus  Cardio: RRR  Resp: Unlabored breathing on 3.5 liters NC  GI/Abd: Soft, minimal RUQ tenderness, not distended, no tenderness over graft  Ext: Full ROM, RLE edematous  Skin: Warm, dry    Labs:    02-08    136  |  96  |  16  ----------------------------<  252<H>  4.6   |  31  |  1.19    Ca    9.0      08 Feb 2022 15:06    TPro  7.2  /  Alb  3.6  /  TBili  0.4  /  DBili  x   /  AST  14  /  ALT  13  /  AlkPhos  136<H>  02-08    CAPILLARY BLOOD GLUCOSE      POCT Blood Glucose.: 113 mg/dL (09 Feb 2022 07:39)  POCT Blood Glucose.: 127 mg/dL (09 Feb 2022 06:32)  POCT Blood Glucose.: 140 mg/dL (09 Feb 2022 06:18)  POCT Blood Glucose.: 61 mg/dL (09 Feb 2022 05:31)  POCT Blood Glucose.: 244 mg/dL (08 Feb 2022 14:33)    LIVER FUNCTIONS - ( 08 Feb 2022 15:06 )  Alb: 3.6 g/dL / Pro: 7.2 g/dL / ALK PHOS: 136 U/L / ALT: 13 U/L / AST: 14 U/L / GGT: x                                 13.5   8.08  )-----------( 255      ( 08 Feb 2022 15:06 )             42.7     PT/INR - ( 08 Feb 2022 15:06 )   PT: 14.0 sec;   INR: 1.18 ratio         PTT - ( 08 Feb 2022 15:06 )  PTT:28.5 sec

## 2022-02-09 NOTE — PROGRESS NOTE ADULT - ASSESSMENT
56 year old male with PMH of ESRD s/p LDRT, DM type 1, HTN, restrictive lung disease and CARMEN, DVT, CVA, squamous cell carcinoma, osteomyelitis, presenting with cholecystitis with intraluminal and intramural air in the gallbladder.    - Given the patient's comorbidities, duration of symptoms, and imaging findings, would not be a candidate for cholecystectomy at this time  - Recommend IV antibiotics, medical/pulmonary optimization  - If decompensates would require urgent percutaneous cholecystostomy with IR  - Recommend transplant nephrology consult (follows with Dr. Chi)      Acute Care Surgery  Pager [phone] 56 year old male with PMH of ESRD s/p LDRT, DM type 1, HTN, restrictive lung disease and CARMEN, DVT, CVA, squamous cell carcinoma, osteomyelitis, presenting with cholecystitis with intraluminal and intramural air in the gallbladder.    - Given the patient's comorbidities, duration of symptoms, and imaging findings, would not be a candidate for cholecystectomy  - Recommend IV antibiotics, medical/pulmonary optimization  - Would recommend percutaneous cholecystostomy for drainage and source control   - Recommend transplant nephrology consult (follows with Dr. Chi)      Acute Care Surgery  Pager [phone]

## 2022-02-09 NOTE — PROGRESS NOTE ADULT - SUBJECTIVE AND OBJECTIVE BOX
Patient is a 56y old  Male who presents with a chief complaint of SOB, abd pain (2022 16:40)      SUBJECTIVE / OVERNIGHT EVENTS:     MEDICATIONS  (STANDING):  aspirin  chewable 81 milliGRAM(s) Oral daily  atorvastatin 10 milliGRAM(s) Oral at bedtime  cloNIDine 0.1 milliGRAM(s) Oral two times a day  dextrose 40% Gel 15 Gram(s) Oral once  dextrose 5%. 1000 milliLiter(s) (50 mL/Hr) IV Continuous <Continuous>  dextrose 5%. 1000 milliLiter(s) (100 mL/Hr) IV Continuous <Continuous>  dextrose 50% Injectable 25 Gram(s) IV Push once  dextrose 50% Injectable 12.5 Gram(s) IV Push once  dextrose 50% Injectable 25 Gram(s) IV Push once  enoxaparin Injectable 40 milliGRAM(s) SubCutaneous every 12 hours  famotidine    Tablet 20 milliGRAM(s) Oral daily  gabapentin 300 milliGRAM(s) Oral two times a day  glucagon  Injectable 1 milliGRAM(s) IntraMuscular once  hydrALAZINE 25 milliGRAM(s) Oral three times a day  insulin glargine Injectable (LANTUS) 50 Unit(s) SubCutaneous <User Schedule>  lactated ringers. 1000 milliLiter(s) (75 mL/Hr) IV Continuous <Continuous>  lactobacillus acidophilus 1 Tablet(s) Oral three times a day  losartan 25 milliGRAM(s) Oral daily  metoprolol tartrate 75 milliGRAM(s) Oral two times a day  piperacillin/tazobactam IVPB.. 3.375 Gram(s) IV Intermittent every 8 hours  tacrolimus 1.5 milliGRAM(s) Oral two times a day  trimethoprim   80 mG/sulfamethoxazole 400 mG 1 Tablet(s) Oral daily    MEDICATIONS  (PRN):  acetaminophen     Tablet .. 650 milliGRAM(s) Oral every 6 hours PRN Temp greater or equal to 38C (100.4F), Mild Pain (1 - 3)  melatonin 3 milliGRAM(s) Oral at bedtime PRN Insomnia  ondansetron Injectable 4 milliGRAM(s) IV Push every 8 hours PRN Nausea and/or Vomiting      Vital Signs Last 24 Hrs  T(F): 98.7 (22 @ 12:30), Max: 100 (22 @ 02:00)  HR: 75 (22 @ 12:30) (64 - 81)  BP: 139/72 (22 @ 12:30) (119/74 - 177/66)  RR: 18 (22 @ 12:30) (16 - 18)  SpO2: 95% (22 @ 12:30) (92% - 100%)  Telemetry:   CAPILLARY BLOOD GLUCOSE      POCT Blood Glucose.: 112 mg/dL (2022 19:03)  POCT Blood Glucose.: 123 mg/dL (2022 17:20)  POCT Blood Glucose.: 154 mg/dL (2022 13:21)  POCT Blood Glucose.: 113 mg/dL (2022 07:39)  POCT Blood Glucose.: 127 mg/dL (2022 06:32)  POCT Blood Glucose.: 140 mg/dL (2022 06:18)  POCT Blood Glucose.: 61 mg/dL (2022 05:31)    I&O's Summary      PHYSICAL EXAM:  GENERAL: NAD, well-developed  HEAD:  Atraumatic, Normocephalic  EYES: EOMI, PERRLA, conjunctiva and sclera clear  NECK: Supple, No JVD  CHEST/LUNG: Clear to auscultation bilaterally; No wheeze  HEART: Regular rate and rhythm; No murmurs, rubs, or gallops  ABDOMEN: Soft, Nontender, Nondistended; Bowel sounds present  EXTREMITIES:  2+ Peripheral Pulses, No clubbing, cyanosis, or edema  PSYCH: AAOx3  NEUROLOGY: non-focal  SKIN: No rashes or lesions    LABS:                        13.5   8.08  )-----------( 255      ( 2022 15:06 )             42.7         136  |  96  |  16  ----------------------------<  252<H>  4.6   |  31  |  1.19    Ca    9.0      2022 15:06    TPro  7.2  /  Alb  3.6  /  TBili  0.4  /  DBili  x   /  AST  14  /  ALT  13  /  AlkPhos  136<H>  0208    PT/INR - ( 2022 15:06 )   PT: 14.0 sec;   INR: 1.18 ratio         PTT - ( 2022 15:06 )  PTT:28.5 sec      Urinalysis Basic - ( 2022 17:38 )    Color: Yellow / Appearance: Clear / S.023 / pH: x  Gluc: x / Ketone: Negative  / Bili: Negative / Urobili: 3 mg/dL   Blood: x / Protein: 100 / Nitrite: Negative   Leuk Esterase: Negative / RBC: 18 /hpf / WBC 3 /HPF   Sq Epi: x / Non Sq Epi: 1 /hpf / Bacteria: Negative        RADIOLOGY & ADDITIONAL TESTS:    Imaging Personally Reviewed:    Consultant(s) Notes Reviewed:      Care Discussed with Consultants/Other Providers:

## 2022-02-09 NOTE — PATIENT PROFILE ADULT - FALL HARM RISK - HARM RISK INTERVENTIONS

## 2022-02-09 NOTE — CHART NOTE - NSCHARTNOTEFT_GEN_A_CORE
57 y/o M with T1DM on insulin pump c/b CKD s/p renal transplant, CVA, DVT, chronic lung disease on 2L intermittently, p/w c/o hypoxia. Found to have cholecystitis. Endocrine consulted for mgmt of T1DM on insulin pump.    Per team, pt on 630G medtronic pump which has been on since arrival. Glucose level 61 this morning. Pt has been NPO for IR procedure today (time unsure per team). Pump suspended temporarily and hypoglycemia treated with protocol. Last glucose 127.     Per team, only setting in pump is basal rate of 3 units per hour. There is no carb ratio or ISF.     Advised to decrease basal rate by 70% to 2.1 units/hr in setting of NPO status with hypoglycemia this AM. Confirmed changes made in pump by primary team.    Full consult to follow today    Sherrie Alfredo DO, Endocrine Fellow   Pager [phone] from 9-5PM. After hours and on weekends please call [phone]. 55 y/o M with T1DM on insulin pump c/b CKD s/p renal transplant, CVA, DVT, chronic lung disease on 2L intermittently, p/w c/o hypoxia. Found to have cholecystitis. Endocrine consulted for mgmt of T1DM on insulin pump.    Pt on 630G medtronic pump which has been on since arrival. Glucose level 61 this morning. Pt has been NPO for IR procedure today (time unsure per team). Pump suspended temporarily and hypoglycemia treated with protocol. Last glucose 127.     Per team, only setting in pump is basal rate of 3 units per hour. There is no carb ratio or ISF.     Advised to decrease basal rate by 70% to 2.1 units/hr in setting of NPO status with hypoglycemia this AM. Confirmed changes made in pump by primary team.    Full consult to follow today    Sherrie Alfredo DO, Endocrine Fellow   Pager [phone] from 9-5PM. After hours and on weekends please call [phone].

## 2022-02-09 NOTE — H&P ADULT - ASSESSMENT
57 yo M PMH of ESRD s/p LDRT (2013, current baseline Cr 1.2) DM type 1 w/ on insulin pump, HTN, restrictive lung disease and CARMEN (on nocturnal NIPPV), hx of DVT (previously on Eliquis), CVA (2015) with residual left sided numbness, hx of WTC exposure with chronic hypoxic respiratory failure on 2L O2 PRN, COVID19 infection 12/2021 presents w/ hypoxia and abd pain adm with acute on chronic hypoxic respiratory failure and acute cholecystitis.

## 2022-02-09 NOTE — CONSULT NOTE ADULT - ASSESSMENT
57 yo M PMH of ESRD s/p LDRT (2013, current baseline Cr 1.2) DM type 1 w/ on insulin pump, HTN, restrictive lung disease and CARMEN (on nocturnal NIPPV), hx of DVT (previously on Eliquis), CVA (2015) with residual left sided numbness, hx of WTC exposure with chronic hypoxic respiratory failure on 2L O2 PRN, COVID19 infection 12/2021 presents for 3-4 days of hypoxia, later followed by ruq abd discomfort found to have acute fish

## 2022-02-09 NOTE — CONSULT NOTE ADULT - SUBJECTIVE AND OBJECTIVE BOX
Reason For Consult:     HPI:  57 yo M with type 1 DM on insulin pump complicated by CVA (2015 with residual left sided numbness), ESRD s/p LDRT (2013, current baseline Cr 1.2), HTN, restrictive lung disease and CARMEN (on nocturnal NIPPV), hx of DVT (previously on Eliquis), hx of WTC exposure with chronic hypoxic respiratory failure on 2L O2 PRN, COVID19 infection 12/2021 presents for 3-4 days of hypoxia, later followed by ruq abd discomfort.  Endocrine consulted for DM management.  Patient had an episode of hypoglycemia in ER.     57 y/o M with T1DM on insulin pump c/b CKD s/p renal transplant, CVA, DVT, chronic lung disease on 2L intermittently, p/w c/o hypoxia. Found to have cholecystitis. Endocrine consulted for mgmt of T1DM on insulin pump.    Pt on 630G medtronic pump which has been on since arrival. Glucose level 61 this morning. Pt has been NPO for IR procedure today (time unsure per team). Pump suspended temporarily and hypoglycemia treated with protocol. Last glucose 127.     Per team, only setting in pump is basal rate of 3 units per hour. There is no carb ratio or ISF.     Advised to decrease basal rate by 70% to 2.1 units/hr in setting of NPO status with hypoglycemia this AM. Confirmed changes made in pump by primary team.    Full consult to follow today        He states he began experiencing SOB 4 days prior to admission, noticed his o2 sat at home was 70s-90s when it is usually in the 90s. Two days later, he began experiencing on and off 7/10, ruq discomfort, described as sharp sensation, nonradiating, associated with nausea and decreased appetite and chills and loose diarrhea (3x/day). He tolerated the pain himself and did not take anything for it, but presented to the ED because of persistent hypoxia.  Denies new LE edema, cp, documented fevers, cough, sore throat.    In the ED VSS, O2 92% on 5L, hypertensive to 160s/80s, afebrile  Meds received: zosyn iv, 1 L LR, D50 amp x 1 for hypoglycemia in the ED , tylenol 975 mg po      PAST MEDICAL & SURGICAL HISTORY:  Hypertension    Hyperlipidemia    Diabetes mellitus  Type 1 on insulin pump    CKD (chronic kidney disease)  Renal Transplant 2013 at [facility]    Diabetic peripheral neuropathy    Obesity (BMI 30-39.9)    CVA (cerebral vascular accident)  Wallenberg Stroke  low L body sensation  low R face sensation  decreased peripheral vision  poor balance    Squamous cell carcinoma of skin of left ear    History of DVT (deep vein thrombosis)  s/p eliquis    Recurrent squamous cell carcinoma of skin  nose, L arm    Toe infection  2007 L 4th Toe surgery-amputation secondary to osteomyelitis    Ear disease  Left inner ear surgery, 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, Can NOT have MRI    Vasectomy status  s/p b/l  vasectomy    H/O foot surgery  2005 - right foot - bone fracture - s/p ORIF and subsequent removal of hardware    End-stage renal failure with renal transplant  12/2013    S/P kidney transplant    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        FAMILY HISTORY:  Family history of diabetes mellitus (DM)    FH: skin cancer        Social History:    Outpatient Medications:    MEDICATIONS  (STANDING):  aspirin  chewable 81 milliGRAM(s) Oral daily  atorvastatin 10 milliGRAM(s) Oral at bedtime  cloNIDine 0.1 milliGRAM(s) Oral two times a day  dextrose 40% Gel 15 Gram(s) Oral once  dextrose 5%. 1000 milliLiter(s) (50 mL/Hr) IV Continuous <Continuous>  dextrose 5%. 1000 milliLiter(s) (100 mL/Hr) IV Continuous <Continuous>  dextrose 50% Injectable 25 Gram(s) IV Push once  dextrose 50% Injectable 12.5 Gram(s) IV Push once  dextrose 50% Injectable 25 Gram(s) IV Push once  enoxaparin Injectable 40 milliGRAM(s) SubCutaneous every 12 hours  famotidine    Tablet 20 milliGRAM(s) Oral daily  gabapentin 300 milliGRAM(s) Oral two times a day  glucagon  Injectable 1 milliGRAM(s) IntraMuscular once  hydrALAZINE 25 milliGRAM(s) Oral three times a day  lactated ringers. 1000 milliLiter(s) (75 mL/Hr) IV Continuous <Continuous>  lactobacillus acidophilus 1 Tablet(s) Oral three times a day  losartan 25 milliGRAM(s) Oral daily  metoprolol tartrate 75 milliGRAM(s) Oral two times a day  piperacillin/tazobactam IVPB.. 3.375 Gram(s) IV Intermittent every 8 hours  tacrolimus 1.5 milliGRAM(s) Oral two times a day  trimethoprim   80 mG/sulfamethoxazole 400 mG 1 Tablet(s) Oral daily    MEDICATIONS  (PRN):  acetaminophen     Tablet .. 650 milliGRAM(s) Oral every 6 hours PRN Temp greater or equal to 38C (100.4F), Mild Pain (1 - 3)  melatonin 3 milliGRAM(s) Oral at bedtime PRN Insomnia  ondansetron Injectable 4 milliGRAM(s) IV Push every 8 hours PRN Nausea and/or Vomiting      Allergies    No Known Allergies    Intolerances      Review of Systems:  Constitutional: No fever  Eyes: No blurry vision  Neuro: No tremors  HEENT: No pain  Cardiovascular: No chest pain, palpitations  Respiratory: No SOB, no cough  GI: No nausea, vomiting, abdominal pain  : No dysuria  Skin: no rash  Psych: no depression  Endocrine: no polyuria, polydipsia  Hem/lymph: no swelling  Osteoporosis: no fractures    ALL OTHER SYSTEMS REVIEWED AND NEGATIVE    UNABLE TO OBTAIN    PHYSICAL EXAM:  VITALS: T(C): 37.1 (02-09-22 @ 12:30)  T(F): 98.7 (02-09-22 @ 12:30), Max: 100 (02-09-22 @ 02:00)  HR: 75 (02-09-22 @ 12:30) (64 - 81)  BP: 139/72 (02-09-22 @ 12:30) (119/74 - 177/66)  RR:  (16 - 24)  SpO2:  (88% - 100%)  Wt(kg): --  GENERAL: NAD, well-groomed, well-developed  EYES: No proptosis, no lid lag, anicteric  HEENT:  Atraumatic, Normocephalic, moist mucous membranes  THYROID: Normal size, no palpable nodules  RESPIRATORY: Clear to auscultation bilaterally; No rales, rhonchi, wheezing, or rubs  CARDIOVASCULAR: Regular rate and rhythm; No murmurs; no peripheral edema  GI: Soft, nontender, non distended, normal bowel sounds  SKIN: Dry, intact, No rashes or lesions  MUSCULOSKELETAL: Full range of motion, normal strength  NEURO: sensation intact, extraocular movements intact, no tremor, normal reflexes  PSYCH: Alert and oriented x 3, normal affect, normal mood  CUSHING'S SIGNS: no striae    POCT Blood Glucose.: 113 mg/dL (02-09-22 @ 07:39)  POCT Blood Glucose.: 127 mg/dL (02-09-22 @ 06:32)  POCT Blood Glucose.: 140 mg/dL (02-09-22 @ 06:18)  POCT Blood Glucose.: 61 mg/dL (02-09-22 @ 05:31)  POCT Blood Glucose.: 244 mg/dL (02-08-22 @ 14:33)                            13.5   8.08  )-----------( 255      ( 08 Feb 2022 15:06 )             42.7       02-08    136  |  96  |  16  ----------------------------<  252<H>  4.6   |  31  |  1.19    EGFR if : 79  EGFR if non : 68    Ca    9.0      02-08    TPro  7.2  /  Alb  3.6  /  TBili  0.4  /  DBili  x   /  AST  14  /  ALT  13  /  AlkPhos  136<H>  02-08   Reason For Consult:     HPI:  57 yo M with type 1 DM on insulin pump complicated by CVA (2015 with residual left sided numbness), ESRD s/p renal transplant, PAD s/p amputation, also with HTN, chronic lung disease on 2L intermittently admitted for hypoxia and cholecystitis.  There is no plan for surgery.  Endocrine team consulted for management of T1DM on insulin pump.  Patient is currently on Medtronic 630G and has been on it for 3 years.  He used DexCom G6.  His pump doesn't seem to be properly set up.  He only has basal insulin rate at 3 u/hr, which was decreased to 2.1 u/hr in the hospital after he had a hypoglycemic episode in ER (BG ~ 60).  There is no information on insulin sensitivity, carb ratio.  Patient doesn't put in carb intake, and administers either 5 or 10 u of insulin with meals (if carb then 10 U, otherwise 5 U).  Denies any hypoglycemic episodes.  Has hypoglycemic awareness.  He reports that normally BG are ~ 150, however, for last month BG were in 300.  No change to his diet or medication.      Insulin pump was placed yesterday (2/8), but doesn't have supplies with him.  Can get someone to drop it off.      Diabetes history:   - Dx with type 1 ~ 1999  - Maternal grandparents with T2D; no thyroid disease in the family   - Endocrinologist: Dr. Jasbir Garcia   - Complications: +PAD, +CVA, +CKD  - No diabetes related hospitalization.  No DKA  - Weight: stable   - Diet:   B: cheerio or eggs  L: sandwich  D: protein/veg/carb  Snacks on Oreo's at night every day  Denies soda, juice.  Drinks unsweetened iced tea    In the ED VSS, O2 92% on 5L, hypertensive to 160s/80s, afebrile  Meds received: zosyn iv, 1 L LR, D50 amp x 1 for hypoglycemia in the ED , tylenol 975 mg po    PAST MEDICAL & SURGICAL HISTORY:  Hypertension    Hyperlipidemia    Diabetes mellitus  Type 1 on insulin pump    CKD (chronic kidney disease)  Renal Transplant 2013 at 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, Can NOT have MRI    Vasectomy status  s/p b/l  vasectomy    H/O foot surgery  2005 - right foot - bone fracture - s/p ORIF and subsequent removal of hardware    End-stage renal failure with renal transplant  12/2013    S/P kidney transplant    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        FAMILY HISTORY:  Family history of diabetes mellitus (DM)    FH: skin cancer        Social History: denies tobacco, IVDA, or alcohol    Outpatient Medications:    MEDICATIONS  (STANDING):  aspirin  chewable 81 milliGRAM(s) Oral daily  atorvastatin 10 milliGRAM(s) Oral at bedtime  cloNIDine 0.1 milliGRAM(s) Oral two times a day  dextrose 40% Gel 15 Gram(s) Oral once  dextrose 5%. 1000 milliLiter(s) (50 mL/Hr) IV Continuous <Continuous>  dextrose 5%. 1000 milliLiter(s) (100 mL/Hr) IV Continuous <Continuous>  dextrose 50% Injectable 25 Gram(s) IV Push once  dextrose 50% Injectable 12.5 Gram(s) IV Push once  dextrose 50% Injectable 25 Gram(s) IV Push once  enoxaparin Injectable 40 milliGRAM(s) SubCutaneous every 12 hours  famotidine    Tablet 20 milliGRAM(s) Oral daily  gabapentin 300 milliGRAM(s) Oral two times a day  glucagon  Injectable 1 milliGRAM(s) IntraMuscular once  hydrALAZINE 25 milliGRAM(s) Oral three times a day  lactated ringers. 1000 milliLiter(s) (75 mL/Hr) IV Continuous <Continuous>  lactobacillus acidophilus 1 Tablet(s) Oral three times a day  losartan 25 milliGRAM(s) Oral daily  metoprolol tartrate 75 milliGRAM(s) Oral two times a day  piperacillin/tazobactam IVPB.. 3.375 Gram(s) IV Intermittent every 8 hours  tacrolimus 1.5 milliGRAM(s) Oral two times a day  trimethoprim   80 mG/sulfamethoxazole 400 mG 1 Tablet(s) Oral daily    MEDICATIONS  (PRN):  acetaminophen     Tablet .. 650 milliGRAM(s) Oral every 6 hours PRN Temp greater or equal to 38C (100.4F), Mild Pain (1 - 3)  melatonin 3 milliGRAM(s) Oral at bedtime PRN Insomnia  ondansetron Injectable 4 milliGRAM(s) IV Push every 8 hours PRN Nausea and/or Vomiting      Allergies    No Known Allergies    Intolerances      Review of Systems:  Constitutional: No fever  Eyes: No blurry vision  Neuro: No tremors  HEENT: No pain  Cardiovascular: No chest pain, palpitations  Respiratory: No SOB, no cough  GI: + nausea, - vomiting, + abdominal pain  : No dysuria  Skin: no rash  Psych: no depression  Endocrine: no polyuria, polydipsia  Hem/lymph: no swelling  Osteoporosis: no fractures    ALL OTHER SYSTEMS REVIEWED AND NEGATIVE    PHYSICAL EXAM:  VITALS: T(C): 37.1 (02-09-22 @ 12:30)  T(F): 98.7 (02-09-22 @ 12:30), Max: 100 (02-09-22 @ 02:00)  HR: 75 (02-09-22 @ 12:30) (64 - 81)  BP: 139/72 (02-09-22 @ 12:30) (119/74 - 177/66)  RR:  (16 - 24)  SpO2:  (88% - 100%)  Wt(kg): --  GENERAL: NAD, well-groomed, well-developed  EYES: No proptosis, no lid lag, anicteric  HEENT:  Atraumatic, Normocephalic, moist mucous membranes  THYROID: Normal size, no palpable nodules  RESPIRATORY: Clear to auscultation bilaterally; No rales, rhonchi, wheezing, or rubs  CARDIOVASCULAR: Regular rate and rhythm; No murmurs; no peripheral edema  GI: deferred  SKIN: Dry, intact, No rashes or lesions  MUSCULOSKELETAL: Full range of motion, normal strength  NEURO: sensation intact, extraocular movements intact, no tremor, normal reflexes  PSYCH: Alert and oriented x 3, normal affect, normal mood  CUSHING'S SIGNS: no striae    POCT Blood Glucose.: 113 mg/dL (02-09-22 @ 07:39)  POCT Blood Glucose.: 127 mg/dL (02-09-22 @ 06:32)  POCT Blood Glucose.: 140 mg/dL (02-09-22 @ 06:18)  POCT Blood Glucose.: 61 mg/dL (02-09-22 @ 05:31)  POCT Blood Glucose.: 244 mg/dL (02-08-22 @ 14:33)                            13.5   8.08  )-----------( 255      ( 08 Feb 2022 15:06 )             42.7       02-08    136  |  96  |  16  ----------------------------<  252<H>  4.6   |  31  |  1.19    EGFR if : 79  EGFR if non : 68    Ca    9.0      02-08    TPro  7.2  /  Alb  3.6  /  TBili  0.4  /  DBili  x   /  AST  14  /  ALT  13  /  AlkPhos  136<H>  02-08

## 2022-02-09 NOTE — H&P ADULT - PROBLEM SELECTOR PLAN 1
CTA neg for PE or PNA, but does show small to moderate R pleural effusion and atelectasis  likely compressive atelectasis due to splinting from pain associated w/ acute cholecystitis  - c/w NC 4L and wean down as tolerated  - monitor O2  - incentive spirometry and encourage use

## 2022-02-09 NOTE — CONSULT NOTE ADULT - PROBLEM SELECTOR RECOMMENDATION 9
s/p LDRT (2013, current baseline Cr 1.2)  Allograft functioning well. SCr currently at baseline 1.1  Tacro level 6.7 Goal 4-6. C/w tacrolimus 1.5 mg Oral two times a day. Check tacro trough 30 mins prior to next AM dose. Hold azathioprine 100 mg Oral daily given acute fish. Continue PCP ppx with Bactrim            If you have any questions, please feel free to contact me  Nayla Rogers  Nephrology Fellow  Pager NS: [phone]/ [facility]: [phone]    (After 5 pm or on weekends please page the on-call fellow, can check [email or web address] for schedule. Login is blaze hernandez, schedule under [facility] medicine, psych, derm) s/p LDRT (2013, current baseline Cr 1.2)  Allograft functioning well. SCr currently at baseline 1.1  Tacro level 6.7 Goal 4-6. C/w tacrolimus 1.5 mg Oral two times a day. Check tacro trough 30 mins prior to next AM dose. Continue azathioprine 100 mg Oral daily given acute fish rule out (HIDA negative, afebrile, no leukocytosis). Continue PCP ppx with Bactrim        Hypoxia with increased baseline O2 requirements- CTA -ve for PE, small or moderate R pleural effusion, b/l atelectasis.  Right hemidiaphragm elevated on imaging likely causing R sided atelectasis. Possible cause of hypoxia. Incentive spirometry           If you have any questions, please feel free to contact puneet Rogers  Nephrology Fellow  Pager NS: [phone]/ [facility]: [phone]    (After 5 pm or on weekends please page the on-call fellow, can check [email or web address] for schedule. Login is blaze hernandez, schedule under [facility] medicine, psych, derm) s/p LDRT (2013, current baseline Cr 1.2)  Allograft functioning well. SCr currently at baseline 1.1  Tacro level 6.7 Goal 4-6. C/w tacrolimus 1.5 mg Oral two times a day. Check tacro trough 30 mins prior to next AM dose. Continue azathioprine 100 mg Oral daily given acute fish rule out (HIDA negative, afebrile, no leukocytosis). Continue PCP ppx with Bactrim        Hypoxia with increased baseline O2 requirements- CTA -ve for PE, small or moderate R pleural effusion, b/l atelectasis.  Right hemidiaphragm elevated on imaging likely causing R sided atelectasis. Possible cause of hypoxia. Incentive spirometry. RLE duplex -ve for DVT in the past           If you have any questions, please feel free to contact me  Nayla Rogers  Nephrology Fellow  Pager NS: [phone]/ [facility]: [phone]    (After 5 pm or on weekends please page the on-call fellow, can check [email or web address] for schedule. Login is blaze hernandez, schedule under [facility] medicine, psych, derm)

## 2022-02-09 NOTE — H&P ADULT - NSICDXPASTMEDICALHX_GEN_ALL_CORE_FT
PAST MEDICAL HISTORY:  CKD (chronic kidney disease) Renal Transplant 2013 at [facility]    CVA (cerebral vascular accident) Wallenberg Stroke  low L body sensation  low R face sensation  decreased peripheral vision  poor balance    Diabetes mellitus Type 1 on insulin pump    Diabetic peripheral neuropathy     History of DVT (deep vein thrombosis) s/p eliquis    Hyperlipidemia     Hypertension     Obesity (BMI 30-39.9)     Recurrent squamous cell carcinoma of skin nose, L arm    Squamous cell carcinoma of skin of left ear

## 2022-02-09 NOTE — CONSULT NOTE ADULT - ASSESSMENT
57 yo M with type 1 DM on insulin pump complicated by CVA (2015 with residual left sided numbness), ESRD s/p renal transplant, PAD s/p amputation, also with HTN, chronic lung disease on 2L intermittently admitted for hypoxia and cholecystitis.      # type 1 DM  # hypoglycemia   - please check A1c   - Pt doesn't seem to have a proper set up of his insulin pump, plus doesn't have supplies with him.  Would recommend stopping his insulin pump and starting basal/bolus.   - Pt is currently getting 2.1 u/hr and hasn't had a hypoglycemic episode.  Would recommend starting Lantus 40  57 yo M with type 1 DM on insulin pump complicated by CVA (2015 with residual left sided numbness), ESRD s/p renal transplant, PAD s/p amputation, also with HTN, chronic lung disease on 2L intermittently admitted for hypoxia and cholecystitis.      # type 1 DM  # hypoglycemic episode, resolved  - Please check A1c   - Pt doesn't seem to have a proper set up of his insulin pump, plus doesn't have supplies with him.  Would recommend stopping his insulin pump and starting basal/bolus.   - Pt is currently getting 2.1 u/hr and hasn't had a hypoglycemic episode.  Would recommend starting Lantus 50 U and then stopping the pump about 2 hours after giving Lantus.   - pt is NPO right now; so c/w low dose SS q6H.  Once pt is tolerating diet, can start Lispro 5 U TID with meals.   - please monitor fingerstick blood glucose at least 4 times a day to avoid insulin toxicity, hypoglycemia; Blood glucose target while hospitalized 140-180 mg/dL  - Carbohydrate controlled diet, no concentrated sweets  - Counseled on need for medication adherence to avoid new complications.   - hypoglycemia protocol    # Hypertension: BP goal <130/80; defer to primary team    # Chronic Kidney disease - cautious insulin titraiton; high risk for insulin stacking and hypoglycemia    DM Discharge planning  Please contact diabetes team prior to patient's discharge for finalized regimen. Please allow 24-48 hours for discharge planning.   He doesn't seem to have a good understanding of his pump but has been managing the pump for 3 years.  So, it would be safe to d/c him on it.    He can follow with us in the clinic at [address] [place] [address].  Please call [phone].  We will also try to get him an appointment as he gets closer to discharge.      *Please note a different provider maybe managing this patient tomorrow/daily*. Please contact our office at [phone] regarding follow-up queries about this patient. For any endocrine emergencies, please state urgency when calling [phone] during business hours and to speak with on-call fellow after 5pm and on weekends.    Lindsey Russo MD  Cell: 9AM - 5PM (Mon-Fri): [phone]  After 5PM and on Weekends: [phone]     For nonurgent matters, please email [email or web address] for assistance.         55 yo M with type 1 DM on insulin pump complicated by CVA (2015 with residual left sided numbness), ESRD s/p renal transplant, PAD s/p amputation, also with HTN, chronic lung disease on 2L intermittently admitted for hypoxia and cholecystitis.      # type 1 DM  # hypoglycemic episode, resolved  - Please check A1c   - Pt doesn't seem to have a proper set up of his insulin pump, plus doesn't have supplies with him.  Would recommend stopping his insulin pump and starting basal/bolus.   - Pt is currently getting 2.1 u/hr and hasn't had a hypoglycemic episode.  Would recommend starting Lantus 50 U and then stopping the pump about 2 hours after giving Lantus.   - pt is NPO right now; so c/w low dose SS q6H.  Once pt is tolerating diet, can start Lispro 5 U TID with meals.   - please monitor fingerstick blood glucose at least 4 times a day to avoid insulin toxicity, hypoglycemia; Blood glucose target while hospitalized 140-180 mg/dL  - Carbohydrate controlled diet, no concentrated sweets  - Counseled on need for medication adherence to avoid new complications.   - hypoglycemia protocol    # Hypertension: BP goal <130/80; defer to primary team    # Chronic Kidney disease - cautious insulin titraiton; high risk for insulin stacking and hypoglycemia    DM Discharge planning  Please contact diabetes team prior to patient's discharge for finalized regimen. Please allow 24-48 hours for discharge planning.   His pump is not properly setup, but he has been managing on the pump for last 3 years. NO DKA or hospitalization, so, it would be safe to d/c him on it.    He has private endocrinologist, but he would like to follow with us in the clinic at [address] [place] [address].  Please call [phone].  We will also try to get him an appointment as he gets closer to the discharge.      *Please note a different provider maybe managing this patient tomorrow/daily*. Please contact our office at [phone] regarding follow-up queries about this patient. For any endocrine emergencies, please state urgency when calling [phone] during business hours and to speak with on-call fellow after 5pm and on weekends.    Lindsey Russo MD  Cell: 9AM - 5PM (Mon-Fri): [phone]  After 5PM and on Weekends: [phone]     For nonurgent matters, please email [email or web address] for assistance.      POC d/w the team and pt.      57 yo M with type 1 DM on insulin pump complicated by CVA (2015 with residual left sided numbness), ESRD s/p renal transplant, PAD s/p amputation, also with HTN, chronic lung disease on 2L intermittently admitted for hypoxia and cholecystitis.      # type 1 DM  # hypoglycemic episode, resolved  - Please check A1c   - Pt doesn't seem to have a proper set up of his insulin pump, plus doesn't have supplies with him.  Would recommend stopping his insulin pump and starting basal/bolus.   - Pt is currently getting 2.1 u/hr and hasn't had a hypoglycemic episode.  Would recommend starting Lantus 50 U and then stopping the pump about 2 hours after giving Lantus.  Then c/w Lantus 50 daily at the given time.   - pt is NPO right now; so c/w low dose SS q6H.  Once pt is tolerating diet, can start Lispro 5 U TID with meals.   - please monitor fingerstick blood glucose at least 4 times a day to avoid insulin toxicity, hypoglycemia; Blood glucose target while hospitalized 140-180 mg/dL  - Carbohydrate controlled diet, no concentrated sweets  - Counseled on need for medication adherence to avoid new complications.   - hypoglycemia protocol    # Hypertension: BP goal <130/80; defer to primary team    # Chronic Kidney disease - cautious insulin titraiton; high risk for insulin stacking and hypoglycemia    DM Discharge planning  Please contact diabetes team prior to patient's discharge for finalized regimen. Please allow 24-48 hours for discharge planning.   His pump is not properly setup, but he has been managing on the pump for last 3 years. NO DKA or hospitalization, so, it would be safe to d/c him on it.    He has private endocrinologist, but he would like to follow with us in the clinic at [address] [place] [address].  Please call [phone].  We will also try to get him an appointment as he gets closer to the discharge.      *Please note a different provider maybe managing this patient tomorrow/daily*. Please contact our office at [phone] regarding follow-up queries about this patient. For any endocrine emergencies, please state urgency when calling [phone] during business hours and to speak with on-call fellow after 5pm and on weekends.    Lindsey Russo MD  Cell: 9AM - 5PM (Mon-Fri): [phone]  After 5PM and on Weekends: [phone]     For nonurgent matters, please email [email or web address] for assistance.      POC d/w the team and pt.

## 2022-02-09 NOTE — H&P ADULT - NSHPLABSRESULTS_GEN_ALL_CORE
LABS:                         13.5   8.08  )-----------( 255      ( 2022 15:06 )             42.7         136  |  96  |  16  ----------------------------<  252<H>  4.6   |  31  |  1.19    Ca    9.0      2022 15:06    TPro  7.2  /  Alb  3.6  /  TBili  0.4  /  DBili  x   /  AST  14  /  ALT  13  /  AlkPhos  136<H>  02-08    PT/INR - ( 2022 15:06 )   PT: 14.0 sec;   INR: 1.18 ratio         PTT - ( 2022 15:06 )  PTT:28.5 sec  Urinalysis Basic - ( 2022 17:38 )    Color: Yellow / Appearance: Clear / S.023 / pH: x  Gluc: x / Ketone: Negative  / Bili: Negative / Urobili: 3 mg/dL   Blood: x / Protein: 100 / Nitrite: Negative   Leuk Esterase: Negative / RBC: 18 /hpf / WBC 3 /HPF   Sq Epi: x / Non Sq Epi: 1 /hpf / Bacteria: Negative    CXR personally reviewed small r pleural effusion  CTA chest:   IMPRESSION:    No pulmonary embolus.    Partially imaged distended gallbladder containing sludge with trace   pericholecystic fat stranding. A few foci of nondependent air likely   within the gallbladder lumen. Findings are worrisome for acute   cholecystitis. Recommend further evaluation with dedicated right upper   quadrant sonogram.    Small to moderate right pleural effusion with adjacent right lower lobe   partial compressive atelectasis.    ABD U/S:  IMPRESSION:    Mildly distended gallbladder with diffuse wall thickening containing   intraluminal air and possible air within the wall. Pericholecystic   inflammation suggested on CT. Despite negative sonographic Ashley's sign,   this constellation of findings is suspicious for emphysematous   cholecystitis. Recommend surgical consultation.

## 2022-02-09 NOTE — CONSULT NOTE ADULT - SUBJECTIVE AND OBJECTIVE BOX
[facility] DIVISION OF KIDNEY DISEASES AND HYPERTENSION -- INITIAL CONSULT NOTE  --------------------------------------------------------------------------------  HPI: 57 yo M PMH of ESRD s/p LDRT (2013, current baseline Cr 1.2) DM type 1 w/ on insulin pump, HTN, restrictive lung disease and CARMEN (on nocturnal NIPPV), hx of DVT (previously on Eliquis), CVA (2015) with residual left sided numbness, hx of WTC exposure with chronic hypoxic respiratory failure on 2L O2 PRN, COVID19 infection 12/2021 presents for 3-4 days of hypoxia, later followed by ruq abd discomfort. He states he began experiencing SOB 4 days prior to admission, noticed his o2 sat at home was 70s-90s when it is usually in the 90s. Two days later, he began experiencing on and off 7/10, ruq discomfort, described as sharp sensation, nonradiating, associated with nausea and decreased appetite and chills and loose diarrhea (3x/day). He tolerated the pain himself and did not take anything for it, but presented to the ED because of persistent hypoxia.  Denies new LE edema, cp, documented fevers, cough, sore throat. In the ED VSS, O2 92% on 5L, hypertensive to 160s/80s, afebrile. Pt follows up with Dr. Chi as outpatient         PAST HISTORY  --------------------------------------------------------------------------------  PAST MEDICAL & SURGICAL HISTORY:  Hypertension    Hyperlipidemia    Diabetes mellitus  Type 1 on insulin pump    CKD (chronic kidney disease)  Renal Transplant 2013 at [facility]    Diabetic peripheral neuropathy    Obesity (BMI 30-39.9)    CVA (cerebral vascular accident)  Wallenberg Stroke  low L body sensation  low R face sensation  decreased peripheral vision  poor balance    Squamous cell carcinoma of skin of left ear    History of DVT (deep vein thrombosis)  s/p eliquis    Recurrent squamous cell carcinoma of skin  nose, L arm    Toe infection  2007 L 4th Toe surgery-amputation secondary to osteomyelitis    Ear disease  Left inner ear surgery, 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, Can NOT have MRI    Vasectomy status  s/p b/l  vasectomy    H/O foot surgery  2005 - right foot - bone fracture - s/p ORIF and subsequent removal of hardware    End-stage renal failure with renal transplant  12/2013    S/P kidney transplant    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      FAMILY HISTORY:  Family history of diabetes mellitus (DM)    FH: skin cancer      PAST SOCIAL HISTORY:    ALLERGIES & MEDICATIONS  --------------------------------------------------------------------------------  Allergies    No Known Allergies    Intolerances      Standing Inpatient Medications  aspirin  chewable 81 milliGRAM(s) Oral daily  atorvastatin 10 milliGRAM(s) Oral at bedtime  cloNIDine 0.1 milliGRAM(s) Oral two times a day  dextrose 40% Gel 15 Gram(s) Oral once  dextrose 5%. 1000 milliLiter(s) IV Continuous <Continuous>  dextrose 5%. 1000 milliLiter(s) IV Continuous <Continuous>  dextrose 50% Injectable 25 Gram(s) IV Push once  dextrose 50% Injectable 12.5 Gram(s) IV Push once  dextrose 50% Injectable 25 Gram(s) IV Push once  enoxaparin Injectable 40 milliGRAM(s) SubCutaneous every 12 hours  famotidine    Tablet 20 milliGRAM(s) Oral daily  gabapentin 300 milliGRAM(s) Oral two times a day  glucagon  Injectable 1 milliGRAM(s) IntraMuscular once  hydrALAZINE 25 milliGRAM(s) Oral three times a day  lactated ringers. 1000 milliLiter(s) IV Continuous <Continuous>  lactobacillus acidophilus 1 Tablet(s) Oral three times a day  losartan 25 milliGRAM(s) Oral daily  metoprolol tartrate 75 milliGRAM(s) Oral two times a day  piperacillin/tazobactam IVPB.. 3.375 Gram(s) IV Intermittent every 8 hours  tacrolimus 1.5 milliGRAM(s) Oral two times a day  trimethoprim   80 mG/sulfamethoxazole 400 mG 1 Tablet(s) Oral daily    PRN Inpatient Medications  acetaminophen     Tablet .. 650 milliGRAM(s) Oral every 6 hours PRN  melatonin 3 milliGRAM(s) Oral at bedtime PRN  ondansetron Injectable 4 milliGRAM(s) IV Push every 8 hours PRN      REVIEW OF SYSTEMS  --------------------------------------------------------------------------------    Gen: No fatigue, fevers/chills, weakness  Head/Eyes/Ears/Mouth: No headache;No sore throat  Respiratory: No dyspnea, cough,   CV: No chest pain, PND, orthopnea  GI: No abdominal pain, diarrhea, constipation, nausea, vomiting  Transplant: No pain  : No increased frequency, dysuria, hematuria, nocturia  MSK: No joint pain/swelling; no back pain; no edema  Neuro: No dizziness/lightheadedness, weakness      All other systems were reviewed and are negative, except as noted.    T(C): 37.1 (02-09-22 @ 12:30), Max: 37.8 (02-09-22 @ 02:00)  HR: 75 (02-09-22 @ 12:30) (64 - 81)  BP: 139/72 (02-09-22 @ 12:30) (119/74 - 177/66)  RR: 18 (02-09-22 @ 12:30) (16 - 20)  SpO2: 95% (02-09-22 @ 12:30) (92% - 100%)  Wt(kg): --  Height (cm): 185.4 (02-09-22 @ 12:30)  Weight (kg): 145.5 (02-09-22 @ 12:30)  BMI (kg/m2): 42.3 (02-09-22 @ 12:30)  BSA (m2): 2.63 (02-09-22 @ 12:30)            VITALS/PHYSICAL EXAM  --------------------------------------------------------------------------------    Physical Exam:  	Gen: NAD, well-appearing  	HEENT: supple neck  	Pulm: CTA B/L  	CV: RRR, S1S2; no rub  	Back:  no sacral edema  	Abd: +BS, soft, nontender/nondistended          Transplant: No tenderness, swelling  	Ext: no edema b/l, no asterexis  	Neuro: No focal deficits  	Psych: Normal affect and mood  	Skin: Warm, without rashes    LABS/STUDIES  --------------------------------------------------------------------------------              13.5   8.08  >-----------<  255      [02-08-22 @ 15:06]              42.7     136  |  96  |  16  ----------------------------<  252      [02-08-22 @ 15:06]  4.6   |  31  |  1.19        Ca     9.0     [02-08-22 @ 15:06]    TPro  7.2  /  Alb  3.6  /  TBili  0.4  /  DBili  x   /  AST  14  /  ALT  13  /  AlkPhos  136  [02-08-22 @ 15:06]    PT/INR: PT 14.0 , INR 1.18       [02-08-22 @ 15:06]  PTT: 28.5       [02-08-22 @ 15:06]      Creatinine Trend:  SCr 1.19 [02-08 @ 15:06]    Urinalysis - [02-08-22 @ 17:38]      Color Yellow / Appearance Clear / SG 1.023 / pH 6.0      Gluc 100 mg/dL / Ketone Negative  / Bili Negative / Urobili 3 mg/dL       Blood Small / Protein 100 / Leuk Est Negative / Nitrite Negative      RBC 18 / WBC 3 / Hyaline 2 / Gran  / Sq Epi  / Non Sq Epi 1 / Bacteria Negative      HbA1c 7.1      [12-17-19 @ 10:59]    HIV Nonreact      [08-27-20 @ 08:47]      TacrolimusTacrolimus (), Serum: 7.8 ng/mL (02-08 @ 23:55)    Cyclosporine  Sirolimus  Mycophenolate  BK PCR  CMV PCR  Parvo PCR  EBV PCR [facility] DIVISION OF KIDNEY DISEASES AND HYPERTENSION -- INITIAL CONSULT NOTE  --------------------------------------------------------------------------------  HPI: 55 yo M PMH of ESRD s/p LDRT (2013, current baseline Cr 1.2) DM type 1 w/ on insulin pump, HTN, restrictive lung disease and CARMEN (on nocturnal NIPPV), hx of DVT (previously on Eliquis), CVA (2015) with residual left sided numbness, hx of WTC exposure with chronic hypoxic respiratory failure on 2L O2 PRN, COVID19 infection 12/2021 presents for 3-4 days of hypoxia, later followed by ruq abd discomfort. He states he began experiencing SOB 4 days prior to admission, noticed his o2 sat at home was 70s-90s when it is usually in the 90s. Two days later, he began experiencing on and off 7/10, ruq discomfort, described as sharp sensation, nonradiating, associated with nausea and decreased appetite and chills and loose diarrhea (3x/day). He tolerated the pain himself and did not take anything for it, but presented to the ED because of persistent hypoxia.  Denies new LE edema, cp, documented fevers, cough, sore throat. In the ED VSS, O2 92% on 5L, hypertensive to 160s/80s, afebrile. Pt follows up with Dr. Chi as outpatient       Pt appears comfortable. On 4L NC (on baseline 2L). Afebrile. RUQ pain improved.         PAST HISTORY  --------------------------------------------------------------------------------  PAST MEDICAL & SURGICAL HISTORY:  Hypertension    Hyperlipidemia    Diabetes mellitus  Type 1 on insulin pump    CKD (chronic kidney disease)  Renal Transplant 2013 at [facility]    Diabetic peripheral neuropathy    Obesity (BMI 30-39.9)    CVA (cerebral vascular accident)  Wallenberg Stroke  low L body sensation  low R face sensation  decreased peripheral vision  poor balance    Squamous cell carcinoma of skin of left ear    History of DVT (deep vein thrombosis)  s/p eliquis    Recurrent squamous cell carcinoma of skin  nose, L arm    Toe infection  2007 L 4th Toe surgery-amputation secondary to osteomyelitis    Ear disease  Left inner ear surgery, 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, Can NOT have MRI    Vasectomy status  s/p b/l  vasectomy    H/O foot surgery  2005 - right foot - bone fracture - s/p ORIF and subsequent removal of hardware    End-stage renal failure with renal transplant  12/2013    S/P kidney transplant    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      FAMILY HISTORY:  Family history of diabetes mellitus (DM)    FH: skin cancer      PAST SOCIAL HISTORY:    ALLERGIES & MEDICATIONS  --------------------------------------------------------------------------------  Allergies    No Known Allergies    Intolerances      Standing Inpatient Medications  aspirin  chewable 81 milliGRAM(s) Oral daily  atorvastatin 10 milliGRAM(s) Oral at bedtime  cloNIDine 0.1 milliGRAM(s) Oral two times a day  dextrose 40% Gel 15 Gram(s) Oral once  dextrose 5%. 1000 milliLiter(s) IV Continuous <Continuous>  dextrose 5%. 1000 milliLiter(s) IV Continuous <Continuous>  dextrose 50% Injectable 25 Gram(s) IV Push once  dextrose 50% Injectable 12.5 Gram(s) IV Push once  dextrose 50% Injectable 25 Gram(s) IV Push once  enoxaparin Injectable 40 milliGRAM(s) SubCutaneous every 12 hours  famotidine    Tablet 20 milliGRAM(s) Oral daily  gabapentin 300 milliGRAM(s) Oral two times a day  glucagon  Injectable 1 milliGRAM(s) IntraMuscular once  hydrALAZINE 25 milliGRAM(s) Oral three times a day  lactated ringers. 1000 milliLiter(s) IV Continuous <Continuous>  lactobacillus acidophilus 1 Tablet(s) Oral three times a day  losartan 25 milliGRAM(s) Oral daily  metoprolol tartrate 75 milliGRAM(s) Oral two times a day  piperacillin/tazobactam IVPB.. 3.375 Gram(s) IV Intermittent every 8 hours  tacrolimus 1.5 milliGRAM(s) Oral two times a day  trimethoprim   80 mG/sulfamethoxazole 400 mG 1 Tablet(s) Oral daily    PRN Inpatient Medications  acetaminophen     Tablet .. 650 milliGRAM(s) Oral every 6 hours PRN  melatonin 3 milliGRAM(s) Oral at bedtime PRN  ondansetron Injectable 4 milliGRAM(s) IV Push every 8 hours PRN      REVIEW OF SYSTEMS  --------------------------------------------------------------------------------    Gen: No fatigue, fevers/chills, weakness  Head/Eyes/Ears/Mouth: No headache;No sore throat  Respiratory: No dyspnea, cough,   CV: No chest pain, PND, orthopnea  GI: No abdominal pain, diarrhea, constipation, nausea, vomiting  Transplant: No pain  : No increased frequency, dysuria, hematuria, nocturia  MSK: No joint pain/swelling; no back pain; no edema  Neuro: No dizziness/lightheadedness, weakness      All other systems were reviewed and are negative, except as noted.    T(C): 37.1 (02-09-22 @ 12:30), Max: 37.8 (02-09-22 @ 02:00)  HR: 75 (02-09-22 @ 12:30) (64 - 81)  BP: 139/72 (02-09-22 @ 12:30) (119/74 - 177/66)  RR: 18 (02-09-22 @ 12:30) (16 - 20)  SpO2: 95% (02-09-22 @ 12:30) (92% - 100%)  Wt(kg): --  Height (cm): 185.4 (02-09-22 @ 12:30)  Weight (kg): 145.5 (02-09-22 @ 12:30)  BMI (kg/m2): 42.3 (02-09-22 @ 12:30)  BSA (m2): 2.63 (02-09-22 @ 12:30)            VITALS/PHYSICAL EXAM  --------------------------------------------------------------------------------    Physical Exam:  	Gen: NAD, well-appearing, on 4L NC, obese  	HEENT: supple neck  	Pulm: CTA B/L  	CV: RRR, S1S2; no rub  	Back:  no sacral edema  	Abd: +BS, soft, nontender/nondistended          Transplant: No tenderness, swelling  	Ext: RLE edema  	Neuro: No focal deficits  	Psych: Normal affect and mood  	Skin: Warm, without rashes    LABS/STUDIES  --------------------------------------------------------------------------------              13.5   8.08  >-----------<  255      [02-08-22 @ 15:06]              42.7     136  |  96  |  16  ----------------------------<  252      [02-08-22 @ 15:06]  4.6   |  31  |  1.19        Ca     9.0     [02-08-22 @ 15:06]    TPro  7.2  /  Alb  3.6  /  TBili  0.4  /  DBili  x   /  AST  14  /  ALT  13  /  AlkPhos  136  [02-08-22 @ 15:06]    PT/INR: PT 14.0 , INR 1.18       [02-08-22 @ 15:06]  PTT: 28.5       [02-08-22 @ 15:06]      Creatinine Trend:  SCr 1.19 [02-08 @ 15:06]    Urinalysis - [02-08-22 @ 17:38]      Color Yellow / Appearance Clear / SG 1.023 / pH 6.0      Gluc 100 mg/dL / Ketone Negative  / Bili Negative / Urobili 3 mg/dL       Blood Small / Protein 100 / Leuk Est Negative / Nitrite Negative      RBC 18 / WBC 3 / Hyaline 2 / Gran  / Sq Epi  / Non Sq Epi 1 / Bacteria Negative      HbA1c 7.1      [12-17-19 @ 10:59]    HIV Nonreact      [08-27-20 @ 08:47]      TacrolimusTacrolimus (), Serum: 7.8 ng/mL (02-08 @ 23:55)    Cyclosporine  Sirolimus  Mycophenolate  BK PCR  CMV PCR  Parvo PCR  EBV PCR

## 2022-02-09 NOTE — CHART NOTE - NSCHARTNOTEFT_GEN_A_CORE
Called  by  RN that   pt  has  a  fingerstick  of  61  Patient is a 56y old  Male who presents with a chief complaint of     Vital Signs Last 24 Hrs  T(C): 37.2 (09 Feb 2022 04:56), Max: 37.8 (09 Feb 2022 02:00)  T(F): 98.9 (09 Feb 2022 04:56), Max: 100 (09 Feb 2022 02:00)  HR: 74 (09 Feb 2022 04:56) (64 - 80)  BP: 163/86 (09 Feb 2022 04:56) (119/74 - 177/66)  BP(mean): --  RR: 18 (09 Feb 2022 04:56) (16 - 24)  SpO2: 92% (09 Feb 2022 04:56) (88% - 100%)      Labs:                          13.5   8.08  )-----------( 255      ( 08 Feb 2022 15:06 )             42.7     02-08    136  |  96  |  16  ----------------------------<  252<H>  4.6   |  31  |  1.19    Ca    9.0      08 Feb 2022 15:06    TPro  7.2  /  Alb  3.6  /  TBili  0.4  /  DBili  x   /  AST  14  /  ALT  13  /  AlkPhos  136<H>  02-08        Radiology:    Physical Exam:  General: WN/WD NAD  Neurology: A&Ox3, nonfocal, BOO x 4  Head:  Normocephalic, atraumatic  Respiratory: CTA B/L  CV: RRR, S1S2, no murmur  Abdominal: Soft, NT, ND no palpable mass  MSK: No edema, + peripheral pulses, FROM all 4 extremity    Assessment & Plan:  HPI:    >  >  >  >        Follow up with Attending in AM. Called  by  RN that   pt  has  a  fingerstick  of  61  Patient is a 56y old  Male who presents with a chief complaint of     Vital Signs Last 24 Hrs  T(C): 37.2 (09 Feb 2022 04:56), Max: 37.8 (09 Feb 2022 02:00)  T(F): 98.9 (09 Feb 2022 04:56), Max: 100 (09 Feb 2022 02:00)  HR: 74 (09 Feb 2022 04:56) (64 - 80)  BP: 163/86 (09 Feb 2022 04:56) (119/74 - 177/66)  BP(mean): --  RR: 18 (09 Feb 2022 04:56) (16 - 24)  SpO2: 92% (09 Feb 2022 04:56) (88% - 100%)      Labs:                          13.5   8.08  )-----------( 255      ( 08 Feb 2022 15:06 )             42.7     02-08    136  |  96  |  16  ----------------------------<  252<H>  4.6   |  31  |  1.19    Ca    9.0      08 Feb 2022 15:06    TPro  7.2  /  Alb  3.6  /  TBili  0.4  /  DBili  x   /  AST  14  /  ALT  13  /  AlkPhos  136<H>  02-08        Radiology:    Physical Exam:  General: WN/WD NAD  Neurology: A&Ox3, nonfocal, BOO x 4  Head:  Normocephalic, atraumatic  Respiratory: CTA B/L  CV: RRR, S1S2, no murmur  Abdominal: Soft, NT, ND no palpable mass  MSK: No edema, + peripheral pulses, FROM all 4 extremity    Assessment & Plan:  HPI:    >  Decreased  Basal  rates  to  2.1    >  Pt  Remains   NPO a t  this  time.    >  >        Follow up with Attending in AM.

## 2022-02-09 NOTE — H&P ADULT - PROBLEM SELECTOR PLAN 6
DVT PPX: lovenox BID given BMI  no PT needs  Dispo: awaiting IR eval, c/w medical management of cholecystitis

## 2022-02-09 NOTE — H&P ADULT - NSHPPHYSICALEXAM_GEN_ALL_CORE
Vital Signs Last 24 Hrs  T(C): 37.2 (09 Feb 2022 04:56), Max: 37.8 (09 Feb 2022 02:00)  T(F): 98.9 (09 Feb 2022 04:56), Max: 100 (09 Feb 2022 02:00)  HR: 74 (09 Feb 2022 04:56) (64 - 80)  BP: 163/86 (09 Feb 2022 04:56) (119/74 - 177/66)  BP(mean): --  RR: 18 (09 Feb 2022 04:56) (16 - 24)  SpO2: 92% (09 Feb 2022 04:56) (88% - 100%)    CONSTITUTIONAL: Obese m, well-groomed, in no apparent distress  EYES: No conjunctival or scleral injection, non-icteric; PERRLA and symmetric  ENMT: No external nasal lesions; nasal mucosa not inflamed; normal dentition; no pharyngeal injection or exudates, oral mucosa with moist membranes  RESPIRATORY: Breathing comfortably; lungs CTA without wheeze/rhonchi/rales. Mild decr breath sounds r lung base.  CARDIOVASCULAR: +S1S2, RRR, no M/G/R; pedal pulses full and symmetric;  trace rle edema (chronic per pt)  GASTROINTESTINAL: No palpable masses or tenderness, +BS throughout, no rebound/guarding; ruq +murphys sign, TTP of ruq  MUSCULOSKELETAL: No digital clubbing or cyanosis; no paraspinal tenderness; no joint effusions of upper or lower extremities  SKIN: No rashes or ulcers noted; no subcutaneous nodules or induration palpable  NEUROLOGIC: sensation intact in LEs b/l to light touch  PSYCHIATRIC: A+O x 3; mood and affect appropriate; appropriate insight and judgment

## 2022-02-09 NOTE — H&P ADULT - PROBLEM SELECTOR PLAN 4
hypoglycemic episode in the setting of insulin pump and NPO status, resolved s/p amp of D50  - seen by endo following for management of insulin pump, appreciate recs  - monitor fs q6h while npo

## 2022-02-09 NOTE — H&P ADULT - PROBLEM SELECTOR PLAN 3
Cr 1.2 at baseline, hx of LDRT 2013  - d/w Dr Chi - renal transplant team to see patient today  - c/w tacrolimus bid dosing, monitor am tacro level  - holding azathioprine given current infection  - f/u renal recs

## 2022-02-09 NOTE — H&P ADULT - PROBLEM SELECTOR PLAN 2
confirmed on RUQ u/s  - appreciate surgery eval - not a surgical candidate but recommended for IR c/s  - IR consulted for perc cholecystostomy tub  - NPO currently, IVF x24 hrs ordered  - zosyn iv  - check GI PCR given loose stools

## 2022-02-10 DIAGNOSIS — Z94.0 KIDNEY TRANSPLANT STATUS: ICD-10-CM

## 2022-02-10 LAB
A1C WITH ESTIMATED AVERAGE GLUCOSE RESULT: 9.4 % — HIGH (ref 4–5.6)
ALBUMIN SERPL ELPH-MCNC: 3.5 G/DL — SIGNIFICANT CHANGE UP (ref 3.3–5)
ALP SERPL-CCNC: 119 U/L — SIGNIFICANT CHANGE UP (ref 40–120)
ALT FLD-CCNC: 11 U/L — SIGNIFICANT CHANGE UP (ref 10–45)
ANION GAP SERPL CALC-SCNC: 12 MMOL/L — SIGNIFICANT CHANGE UP (ref 5–17)
AST SERPL-CCNC: 12 U/L — SIGNIFICANT CHANGE UP (ref 10–40)
BASOPHILS # BLD AUTO: 0.05 K/UL — SIGNIFICANT CHANGE UP (ref 0–0.2)
BASOPHILS NFR BLD AUTO: 0.7 % — SIGNIFICANT CHANGE UP (ref 0–2)
BILIRUB SERPL-MCNC: 0.7 MG/DL — SIGNIFICANT CHANGE UP (ref 0.2–1.2)
BUN SERPL-MCNC: 18 MG/DL — SIGNIFICANT CHANGE UP (ref 7–23)
CALCIUM SERPL-MCNC: 9.4 MG/DL — SIGNIFICANT CHANGE UP (ref 8.4–10.5)
CHLORIDE SERPL-SCNC: 93 MMOL/L — LOW (ref 96–108)
CO2 SERPL-SCNC: 29 MMOL/L — SIGNIFICANT CHANGE UP (ref 22–31)
CREAT SERPL-MCNC: 1.18 MG/DL — SIGNIFICANT CHANGE UP (ref 0.5–1.3)
EOSINOPHIL # BLD AUTO: 0.2 K/UL — SIGNIFICANT CHANGE UP (ref 0–0.5)
EOSINOPHIL NFR BLD AUTO: 2.9 % — SIGNIFICANT CHANGE UP (ref 0–6)
ESTIMATED AVERAGE GLUCOSE: 223 MG/DL — HIGH (ref 68–114)
GLUCOSE BLDC GLUCOMTR-MCNC: 134 MG/DL — HIGH (ref 70–99)
GLUCOSE BLDC GLUCOMTR-MCNC: 151 MG/DL — HIGH (ref 70–99)
GLUCOSE BLDC GLUCOMTR-MCNC: 170 MG/DL — HIGH (ref 70–99)
GLUCOSE BLDC GLUCOMTR-MCNC: 218 MG/DL — HIGH (ref 70–99)
GLUCOSE BLDC GLUCOMTR-MCNC: 274 MG/DL — HIGH (ref 70–99)
GLUCOSE BLDC GLUCOMTR-MCNC: 281 MG/DL — HIGH (ref 70–99)
GLUCOSE SERPL-MCNC: 160 MG/DL — HIGH (ref 70–99)
HCT VFR BLD CALC: 45.6 % — SIGNIFICANT CHANGE UP (ref 39–50)
HGB BLD-MCNC: 14.4 G/DL — SIGNIFICANT CHANGE UP (ref 13–17)
IMM GRANULOCYTES NFR BLD AUTO: 0.4 % — SIGNIFICANT CHANGE UP (ref 0–1.5)
LYMPHOCYTES # BLD AUTO: 1.15 K/UL — SIGNIFICANT CHANGE UP (ref 1–3.3)
LYMPHOCYTES # BLD AUTO: 16.8 % — SIGNIFICANT CHANGE UP (ref 13–44)
MAGNESIUM SERPL-MCNC: 1.8 MG/DL — SIGNIFICANT CHANGE UP (ref 1.6–2.6)
MCHC RBC-ENTMCNC: 27.6 PG — SIGNIFICANT CHANGE UP (ref 27–34)
MCHC RBC-ENTMCNC: 31.6 GM/DL — LOW (ref 32–36)
MCV RBC AUTO: 87.4 FL — SIGNIFICANT CHANGE UP (ref 80–100)
MONOCYTES # BLD AUTO: 0.66 K/UL — SIGNIFICANT CHANGE UP (ref 0–0.9)
MONOCYTES NFR BLD AUTO: 9.6 % — SIGNIFICANT CHANGE UP (ref 2–14)
NEUTROPHILS # BLD AUTO: 4.76 K/UL — SIGNIFICANT CHANGE UP (ref 1.8–7.4)
NEUTROPHILS NFR BLD AUTO: 69.6 % — SIGNIFICANT CHANGE UP (ref 43–77)
NRBC # BLD: 0 /100 WBCS — SIGNIFICANT CHANGE UP (ref 0–0)
PLATELET # BLD AUTO: 265 K/UL — SIGNIFICANT CHANGE UP (ref 150–400)
POTASSIUM SERPL-MCNC: 4.2 MMOL/L — SIGNIFICANT CHANGE UP (ref 3.5–5.3)
POTASSIUM SERPL-SCNC: 4.2 MMOL/L — SIGNIFICANT CHANGE UP (ref 3.5–5.3)
PROT SERPL-MCNC: 7.1 G/DL — SIGNIFICANT CHANGE UP (ref 6–8.3)
RBC # BLD: 5.22 M/UL — SIGNIFICANT CHANGE UP (ref 4.2–5.8)
RBC # FLD: 13.7 % — SIGNIFICANT CHANGE UP (ref 10.3–14.5)
SODIUM SERPL-SCNC: 134 MMOL/L — LOW (ref 135–145)
TACROLIMUS SERPL-MCNC: 6.7 NG/ML — SIGNIFICANT CHANGE UP
WBC # BLD: 6.85 K/UL — SIGNIFICANT CHANGE UP (ref 3.8–10.5)
WBC # FLD AUTO: 6.85 K/UL — SIGNIFICANT CHANGE UP (ref 3.8–10.5)

## 2022-02-10 PROCEDURE — 99232 SBSQ HOSP IP/OBS MODERATE 35: CPT

## 2022-02-10 PROCEDURE — 74176 CT ABD & PELVIS W/O CONTRAST: CPT | Mod: 26

## 2022-02-10 PROCEDURE — 99222 1ST HOSP IP/OBS MODERATE 55: CPT | Mod: GC

## 2022-02-10 RX ORDER — TACROLIMUS 5 MG/1
1.5 CAPSULE ORAL
Refills: 0 | Status: DISCONTINUED | OUTPATIENT
Start: 2022-02-10 | End: 2022-02-15

## 2022-02-10 RX ORDER — PIPERACILLIN AND TAZOBACTAM 4; .5 G/20ML; G/20ML
3.38 INJECTION, POWDER, LYOPHILIZED, FOR SOLUTION INTRAVENOUS EVERY 8 HOURS
Refills: 0 | Status: DISCONTINUED | OUTPATIENT
Start: 2022-02-10 | End: 2022-02-14

## 2022-02-10 RX ORDER — INSULIN LISPRO 100/ML
5 VIAL (ML) SUBCUTANEOUS
Refills: 0 | Status: DISCONTINUED | OUTPATIENT
Start: 2022-02-10 | End: 2022-02-11

## 2022-02-10 RX ORDER — INSULIN LISPRO 100/ML
VIAL (ML) SUBCUTANEOUS AT BEDTIME
Refills: 0 | Status: DISCONTINUED | OUTPATIENT
Start: 2022-02-10 | End: 2022-02-15

## 2022-02-10 RX ADMIN — Medication 75 MILLIGRAM(S): at 18:10

## 2022-02-10 RX ADMIN — PIPERACILLIN AND TAZOBACTAM 25 GRAM(S): 4; .5 INJECTION, POWDER, LYOPHILIZED, FOR SOLUTION INTRAVENOUS at 00:08

## 2022-02-10 RX ADMIN — PIPERACILLIN AND TAZOBACTAM 25 GRAM(S): 4; .5 INJECTION, POWDER, LYOPHILIZED, FOR SOLUTION INTRAVENOUS at 21:33

## 2022-02-10 RX ADMIN — FAMOTIDINE 20 MILLIGRAM(S): 10 INJECTION INTRAVENOUS at 11:32

## 2022-02-10 RX ADMIN — INSULIN GLARGINE 50 UNIT(S): 100 INJECTION, SOLUTION SUBCUTANEOUS at 18:25

## 2022-02-10 RX ADMIN — SODIUM CHLORIDE 75 MILLILITER(S): 9 INJECTION, SOLUTION INTRAVENOUS at 07:29

## 2022-02-10 RX ADMIN — Medication 0.1 MILLIGRAM(S): at 18:10

## 2022-02-10 RX ADMIN — Medication 5 UNIT(S): at 18:08

## 2022-02-10 RX ADMIN — TACROLIMUS 1.5 MILLIGRAM(S): 5 CAPSULE ORAL at 21:32

## 2022-02-10 RX ADMIN — TACROLIMUS 1.5 MILLIGRAM(S): 5 CAPSULE ORAL at 09:36

## 2022-02-10 RX ADMIN — ATORVASTATIN CALCIUM 10 MILLIGRAM(S): 80 TABLET, FILM COATED ORAL at 21:33

## 2022-02-10 RX ADMIN — GABAPENTIN 300 MILLIGRAM(S): 400 CAPSULE ORAL at 18:10

## 2022-02-10 RX ADMIN — Medication 5 UNIT(S): at 12:06

## 2022-02-10 RX ADMIN — Medication 1 TABLET(S): at 11:32

## 2022-02-10 RX ADMIN — ENOXAPARIN SODIUM 40 MILLIGRAM(S): 100 INJECTION SUBCUTANEOUS at 18:09

## 2022-02-10 RX ADMIN — Medication 25 MILLIGRAM(S): at 05:57

## 2022-02-10 RX ADMIN — PIPERACILLIN AND TAZOBACTAM 25 GRAM(S): 4; .5 INJECTION, POWDER, LYOPHILIZED, FOR SOLUTION INTRAVENOUS at 05:56

## 2022-02-10 RX ADMIN — GABAPENTIN 300 MILLIGRAM(S): 400 CAPSULE ORAL at 05:56

## 2022-02-10 RX ADMIN — Medication 1 TABLET(S): at 21:33

## 2022-02-10 RX ADMIN — Medication 25 MILLIGRAM(S): at 21:33

## 2022-02-10 RX ADMIN — PIPERACILLIN AND TAZOBACTAM 25 GRAM(S): 4; .5 INJECTION, POWDER, LYOPHILIZED, FOR SOLUTION INTRAVENOUS at 14:08

## 2022-02-10 RX ADMIN — ENOXAPARIN SODIUM 40 MILLIGRAM(S): 100 INJECTION SUBCUTANEOUS at 09:36

## 2022-02-10 RX ADMIN — Medication 0.1 MILLIGRAM(S): at 05:57

## 2022-02-10 RX ADMIN — Medication 75 MILLIGRAM(S): at 05:56

## 2022-02-10 RX ADMIN — Medication 1 TABLET(S): at 05:56

## 2022-02-10 RX ADMIN — Medication 1 TABLET(S): at 14:00

## 2022-02-10 RX ADMIN — Medication 25 MILLIGRAM(S): at 13:59

## 2022-02-10 RX ADMIN — LOSARTAN POTASSIUM 25 MILLIGRAM(S): 100 TABLET, FILM COATED ORAL at 05:57

## 2022-02-10 RX ADMIN — Medication 1: at 23:52

## 2022-02-10 RX ADMIN — Medication 81 MILLIGRAM(S): at 11:31

## 2022-02-10 NOTE — PROGRESS NOTE ADULT - SUBJECTIVE AND OBJECTIVE BOX
Patient is a 56y old  Male who presents with a chief complaint of SOB, abd pain (10 Feb 2022 17:17)      SUBJECTIVE / OVERNIGHT EVENTS:    Events noted.  CONSTITUTIONAL: No fever,  or fatigue  RESPIRATORY: SOB on supp O2  CARDIOVASCULAR: No chest pain, palpitations, dizziness, or leg swelling  GASTROINTESTINAL: No abdominal or epigastric pain. No nausea, vomiting.  NEUROLOGICAL: No headache    MEDICATIONS  (STANDING):  aspirin  chewable 81 milliGRAM(s) Oral daily  atorvastatin 10 milliGRAM(s) Oral at bedtime  cloNIDine 0.1 milliGRAM(s) Oral two times a day  dextrose 40% Gel 15 Gram(s) Oral once  dextrose 5%. 1000 milliLiter(s) (50 mL/Hr) IV Continuous <Continuous>  dextrose 5%. 1000 milliLiter(s) (100 mL/Hr) IV Continuous <Continuous>  dextrose 50% Injectable 25 Gram(s) IV Push once  dextrose 50% Injectable 12.5 Gram(s) IV Push once  dextrose 50% Injectable 25 Gram(s) IV Push once  enoxaparin Injectable 40 milliGRAM(s) SubCutaneous every 12 hours  famotidine    Tablet 20 milliGRAM(s) Oral daily  gabapentin 300 milliGRAM(s) Oral two times a day  glucagon  Injectable 1 milliGRAM(s) IntraMuscular once  hydrALAZINE 25 milliGRAM(s) Oral three times a day  insulin glargine Injectable (LANTUS) 50 Unit(s) SubCutaneous <User Schedule>  insulin lispro (ADMELOG) corrective regimen sliding scale   SubCutaneous at bedtime  insulin lispro Injectable (ADMELOG) 5 Unit(s) SubCutaneous three times a day before meals  lactated ringers. 1000 milliLiter(s) (75 mL/Hr) IV Continuous <Continuous>  lactobacillus acidophilus 1 Tablet(s) Oral three times a day  losartan 25 milliGRAM(s) Oral daily  metoprolol tartrate 75 milliGRAM(s) Oral two times a day  piperacillin/tazobactam IVPB.. 3.375 Gram(s) IV Intermittent every 8 hours  tacrolimus 1.5 milliGRAM(s) Oral two times a day  trimethoprim   80 mG/sulfamethoxazole 400 mG 1 Tablet(s) Oral daily    MEDICATIONS  (PRN):  acetaminophen     Tablet .. 650 milliGRAM(s) Oral every 6 hours PRN Temp greater or equal to 38C (100.4F), Mild Pain (1 - 3)  melatonin 3 milliGRAM(s) Oral at bedtime PRN Insomnia  ondansetron Injectable 4 milliGRAM(s) IV Push every 8 hours PRN Nausea and/or Vomiting        CAPILLARY BLOOD GLUCOSE      POCT Blood Glucose.: 274 mg/dL (10 Feb 2022 21:03)  POCT Blood Glucose.: 218 mg/dL (10 Feb 2022 17:39)  POCT Blood Glucose.: 170 mg/dL (10 Feb 2022 12:05)  POCT Blood Glucose.: 151 mg/dL (10 Feb 2022 08:27)  POCT Blood Glucose.: 134 mg/dL (10 Feb 2022 04:19)  POCT Blood Glucose.: 131 mg/dL (09 Feb 2022 22:52)    I&O's Summary    09 Feb 2022 07:01  -  10 Feb 2022 07:00  --------------------------------------------------------  IN: 200 mL / OUT: 0 mL / NET: 200 mL    10 Feb 2022 07:01  -  10 Feb 2022 22:17  --------------------------------------------------------  IN: 1320 mL / OUT: 0 mL / NET: 1320 mL        T(C): 36.8 (02-10-22 @ 20:22), Max: 37 (02-10-22 @ 18:00)  HR: 77 (02-10-22 @ 20:22) (76 - 84)  BP: 155/69 (02-10-22 @ 20:22) (144/68 - 155/78)  RR: 18 (02-10-22 @ 20:22) (18 - 18)  SpO2: 94% (02-10-22 @ 20:22) (93% - 95%)    PHYSICAL EXAM:  GENERAL: NAD  NECK: Supple, No JVD  CHEST/LUNG: Clear to auscultation bilaterally; Bibasilar crackles  HEART: Regular rate and rhythm; No murmurs, rubs, or gallops  ABDOMEN: Soft, Nontender, Nondistended; Bowel sounds present  EXTREMITIES:   No edema  NEUROLOGY: AAO X 3      LABS:                        14.4   6.85  )-----------( 265      ( 10 Feb 2022 09:15 )             45.6     02-10    134<L>  |  93<L>  |  18  ----------------------------<  160<H>  4.2   |  29  |  1.18    Ca    9.4      10 Feb 2022 09:15  Mg     1.8     02-10    TPro  7.1  /  Alb  3.5  /  TBili  0.7  /  DBili  x   /  AST  12  /  ALT  11  /  AlkPhos  119  02-10            CAPILLARY BLOOD GLUCOSE      POCT Blood Glucose.: 274 mg/dL (10 Feb 2022 21:03)  POCT Blood Glucose.: 218 mg/dL (10 Feb 2022 17:39)  POCT Blood Glucose.: 170 mg/dL (10 Feb 2022 12:05)  POCT Blood Glucose.: 151 mg/dL (10 Feb 2022 08:27)  POCT Blood Glucose.: 134 mg/dL (10 Feb 2022 04:19)  POCT Blood Glucose.: 131 mg/dL (09 Feb 2022 22:52)        RADIOLOGY & ADDITIONAL TESTS:    Imaging Personally Reviewed:    Consultant(s) Notes Reviewed:      Care Discussed with Consultants/Other Providers:    Dylon Herrmann MD, CMD, FACP    [address] [place] [address]  Office Tel: [phone]  Cell: [phone]

## 2022-02-10 NOTE — PROGRESS NOTE ADULT - PROBLEM SELECTOR PLAN 1
CTA neg for PE or PNA, but does show small to moderate R pleural effusion and atelectasis  likely compressive atelectasis due to splinting from pain associated w/ acute cholecystitis  - c/w NC 4L and wean down as tolerated  - monitor O2  - incentive spirometry and encourage use  -Pul eval called

## 2022-02-10 NOTE — CHART NOTE - NSCHARTNOTEFT_GEN_A_CORE
Please see yesterdays note for full consult.   HIDA negative for cholecystitis  HYpoxia may be related to pleural effusion - suggest consulting pulmonary. May need thorocentesis.   Pt very recently had CT angio PE protocol, would avoid another CT with contrast at this time.

## 2022-02-10 NOTE — PHYSICAL THERAPY INITIAL EVALUATION ADULT - PRECAUTIONS/LIMITATIONS, REHAB EVAL
CT angio chest PE protocol: No pulmonary embolus. Partially imaged distended gallbladder containing sludge with trace pericholecystic fat stranding. A few foci of nondependent air likely within the gallbladder lumen. Findings are worrisome for acute cholecystitis. Recommend further evaluation with dedicated right upper quadrant sonogram. Small to moderate right pleural effusion with adjacent right lower lobe partial compressive atelectasis. CT abdomen R upper quadrant 2/8: Mildly distended gallbladder with diffuse wall thickening containing

## 2022-02-10 NOTE — PROGRESS NOTE ADULT - SUBJECTIVE AND OBJECTIVE BOX
Interventional Radiology    HPI: 56y Male with PMH of ESRD s/p LDRT, DM type 1, HTN, restrictive lung disease and CARMEN, DVT, CVA, squamous cell carcinoma, osteomyelitis, presenting with cholecystitis with intraluminal and intramural air in the gallbladder. IR consulted for evaluation of percutaneous cholecystostomy.    Allergies:   Medications (Abx/Cardiac/Anticoagulation/Blood Products)    aspirin  chewable: 81 milliGRAM(s) Oral ( @ 12:57)  cloNIDine: 0.1 milliGRAM(s) Oral (02-10 @ 05:57)  enoxaparin Injectable: 40 milliGRAM(s) SubCutaneous ( @ 19:02)  hydrALAZINE: 25 milliGRAM(s) Oral (02-10 @ 05:57)  losartan: 25 milliGRAM(s) Oral (02-10 @ 05:57)  metoprolol tartrate: 75 milliGRAM(s) Oral (02-10 @ 05:56)  piperacillin/tazobactam IVPB.: 200 mL/Hr IV Intermittent ( @ 10:41)  piperacillin/tazobactam IVPB..: 25 mL/Hr IV Intermittent (02-10 @ 05:56)  piperacillin/tazobactam IVPB...: 200 mL/Hr IV Intermittent ( @ 23:55)  trimethoprim   80 mG/sulfamethoxazole 400 m Tablet(s) Oral ( @ 17:14)    Data:  185.4  145.5  T(C): 36.5  HR: 80  BP: 151/66  RR: 18  SpO2: 93%    -WBC 8.08 / HgB 13.5 / Hct 42.7 / Plt 255  -Na 136 / Cl 96 / BUN 16 / Glucose 252  -K 4.6 / CO2 31 / Cr 1.19  -ALT 13 / Alk Phos 136 / T.Bili 0.4  -INR 1.18 / PTT 28.5    Assessment/Plan:   56y Male with PMH of ESRD s/p LDRT, DM type 1, HTN, restrictive lung disease and CARMEN, DVT, CVA, squamous cell carcinoma, osteomyelitis, presenting with cholecystitis with intraluminal and intramural air in the gallbladder. IR consulted for evaluation of percutaneous cholecystostomy.    -- No acute intervention from an IR perspective. HIDA negative for acute cholecystitis.  -- Case was discussed with Dr. Albarado.

## 2022-02-10 NOTE — PHYSICAL THERAPY INITIAL EVALUATION ADULT - ADDITIONAL COMMENTS
Pt lives with wife and 3 kids in a private home, 4 steps to enter, 5 steps upstairs or downstairs (split level). PTA pt was (I) with ADLs and functional mobility with no assistive device.

## 2022-02-10 NOTE — PROGRESS NOTE ADULT - SUBJECTIVE AND OBJECTIVE BOX
Contact info:   Ronaldo Benito MD  pager [phone], please provide 10 digit call back number.   You may also contact me on [employer] Teams during business hours today.   Please note that this patient may be followed by another provider tomorrow.   If no answer or after hours, please contact [phone]    Interval History/Subjective: No acute event overnight, doing well, on basal bolus regimen now.   His dexcom G6 sensor fell off.  Doesn't have insulin pump supplies here.     MEDICATIONS  (STANDING):  aspirin  chewable 81 milliGRAM(s) Oral daily  atorvastatin 10 milliGRAM(s) Oral at bedtime  cloNIDine 0.1 milliGRAM(s) Oral two times a day  dextrose 40% Gel 15 Gram(s) Oral once  dextrose 5%. 1000 milliLiter(s) (50 mL/Hr) IV Continuous <Continuous>  dextrose 5%. 1000 milliLiter(s) (100 mL/Hr) IV Continuous <Continuous>  dextrose 50% Injectable 25 Gram(s) IV Push once  dextrose 50% Injectable 12.5 Gram(s) IV Push once  dextrose 50% Injectable 25 Gram(s) IV Push once  enoxaparin Injectable 40 milliGRAM(s) SubCutaneous every 12 hours  famotidine    Tablet 20 milliGRAM(s) Oral daily  gabapentin 300 milliGRAM(s) Oral two times a day  glucagon  Injectable 1 milliGRAM(s) IntraMuscular once  hydrALAZINE 25 milliGRAM(s) Oral three times a day  insulin glargine Injectable (LANTUS) 50 Unit(s) SubCutaneous <User Schedule>  insulin lispro Injectable (ADMELOG) 5 Unit(s) SubCutaneous three times a day before meals  lactated ringers. 1000 milliLiter(s) (75 mL/Hr) IV Continuous <Continuous>  lactobacillus acidophilus 1 Tablet(s) Oral three times a day  losartan 25 milliGRAM(s) Oral daily  metoprolol tartrate 75 milliGRAM(s) Oral two times a day  piperacillin/tazobactam IVPB.. 3.375 Gram(s) IV Intermittent every 8 hours  tacrolimus 1.5 milliGRAM(s) Oral two times a day  trimethoprim   80 mG/sulfamethoxazole 400 mG 1 Tablet(s) Oral daily    MEDICATIONS  (PRN):  acetaminophen     Tablet .. 650 milliGRAM(s) Oral every 6 hours PRN Temp greater or equal to 38C (100.4F), Mild Pain (1 - 3)  melatonin 3 milliGRAM(s) Oral at bedtime PRN Insomnia  ondansetron Injectable 4 milliGRAM(s) IV Push every 8 hours PRN Nausea and/or Vomiting      Allergies    No Known Allergies    Intolerances      Review of Systems:  Constitutional: No fever  Eyes: No blurry vision  Neuro: No tremors  HEENT: No pain  Cardiovascular: No chest pain, palpitations  Respiratory: No SOB, no cough  GI: No nausea, vomiting, abdominal pain  : No dysuria  Skin: no rash  Psych: no depression  Endocrine: no polyuria, polydipsia  Hem/lymph: no swelling    ALL OTHER SYSTEMS REVIEWED AND NEGATIVE    PHYSICAL EXAM:  VITALS: T(C): 36.8 (02-10-22 @ 12:59)  T(F): 98.3 (02-10-22 @ 12:59), Max: 98.5 (02-09-22 @ 20:27)  HR: 80 (02-10-22 @ 13:47) (76 - 95)  BP: 151/71 (02-10-22 @ 13:47) (144/68 - 166/66)  RR:  (18 - 18)  SpO2:  (93% - 94%)  Wt(kg): --  GENERAL: NAD  EYES: No proptosis, no lid lag, anicteric  HEENT:  Atraumatic, Normocephalic, moist mucous membranes  RESPIRATORY: nonlabored respirations  PSYCH: Alert and oriented x 3, normal affect, normal mood      POCT Blood Glucose.: 170 mg/dL (02-10-22 @ 12:05)  POCT Blood Glucose.: 151 mg/dL (02-10-22 @ 08:27)  POCT Blood Glucose.: 134 mg/dL (02-10-22 @ 04:19)  POCT Blood Glucose.: 131 mg/dL (02-09-22 @ 22:52)  POCT Blood Glucose.: 112 mg/dL (02-09-22 @ 19:03)  POCT Blood Glucose.: 123 mg/dL (02-09-22 @ 17:20)  POCT Blood Glucose.: 154 mg/dL (02-09-22 @ 13:21)  POCT Blood Glucose.: 113 mg/dL (02-09-22 @ 07:39)  POCT Blood Glucose.: 127 mg/dL (02-09-22 @ 06:32)  POCT Blood Glucose.: 140 mg/dL (02-09-22 @ 06:18)  POCT Blood Glucose.: 61 mg/dL (02-09-22 @ 05:31)  POCT Blood Glucose.: 244 mg/dL (02-08-22 @ 14:33)      02-10    134<L>  |  93<L>  |  18  ----------------------------<  160<H>  4.2   |  29  |  1.18    EGFR if : 80  EGFR if non : 69    Ca    9.4      02-10  Mg     1.8     02-10    TPro  7.1  /  Alb  3.5  /  TBili  0.7  /  DBili  x   /  AST  12  /  ALT  11  /  AlkPhos  119  02-10        Thyroid Function Tests:

## 2022-02-10 NOTE — PHYSICAL THERAPY INITIAL EVALUATION ADULT - PERTINENT HX OF CURRENT PROBLEM, REHAB EVAL
Pt is a 57 y/o M with PMH of ESRD s/p LDRT, DM type 1, HTN, restrictive lung disease and CARMEN, DVT, CVA, squamous cell carcinoma, osteomyelitis, presents w/ hypoxia and abd pain adm with acute on chronic hypoxic respiratory failure and acute cholecystitis with intraluminal and intramural air in the gallbladder. IR consulted for evaluation of percutaneous cholecystostomy.

## 2022-02-10 NOTE — PROGRESS NOTE ADULT - PROBLEM SELECTOR PLAN 3
-Transplant nephro f/up appreciated  - c/w tacrolimus bid dosing, monitor am tacro level  - holding azathioprine given current infection

## 2022-02-10 NOTE — PROGRESS NOTE ADULT - SUBJECTIVE AND OBJECTIVE BOX
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   Information obtained from chart review    SOB/abd pain  UA w/o pyuria, urine cx no growth  RVP neg  CXR w/o focal consolidations  CTA w/o evidence of PE; demonstrates mild/moderate right pleural effusion, distended gallbladder containing sludge with trace pericholecystic fat stranding. A few foci of nondependent air likely within the gallbladder lumen  Abd US- Mildly distended gallbladder with diffuse wall thickening containing intraluminal air and possible air within the wall  s/p surgery consult- not a surgical candidate  planned for cholecystostomy tube placement, however, HIDA scan w/o evidence of acute cholecystitis  Afebrile, no leukocytosis  Recommend obtaining CT abd/pelvis to r/o abscess or other intra-abdominal pathology (ideally with contrast but would discussed w/ transplant nephrology first)  If CT abd/pelvis unrevealing would sample R pleural effusion to r/o empyema (could cause SOB and referred R sided abd pain)  c/w zosyn for now  if patient has diarrhea would send C Diff, GI PCR, stool culture  check legionella urine antigen  PCP prophylaxis per protocol- on Bactrim 1DS tab daily  full consult note to follow tomorrow    David Bravo M.D.  [phone]  [facility], Division of Infectious Diseases  [phone]  After 5pm on weekdays and all day on weekends - please call [phone]  55 y/o M PMhx ESRD s/p LDRT (2013 on maintenance tacro and azathioprine), DM I, HTN, CARMEN, CVA (2015) w/ residual left sided numbness, COVID19 infection 12/2021 who presents w/ SOB x 3-4 days prior to admission and RUQ abd discomfort 2 days later associated with nausea, decreased appetite, chills and loose stools.   Information obtained from chart review    SOB/abd pain  UA w/o pyuria, urine cx no growth  RVP neg  CXR w/o focal consolidations  CTA w/o evidence of PE; demonstrates mild/moderate right pleural effusion, distended gallbladder containing sludge with trace pericholecystic fat stranding. A few foci of nondependent air likely within the gallbladder lumen  Abd US- Mildly distended gallbladder with diffuse wall thickening containing intraluminal air and possible air within the wall  s/p surgery consult- not a surgical candidate  was planned for cholecystostomy tube placement, however, HIDA scan w/o evidence of acute cholecystitis  Recommend obtaining CT abd/pelvis to r/o abscess or other intra-abdominal pathology (ideally with contrast but would discussed w/ transplant nephrology first)  If CT abd/pelvis unrevealing would sample R pleural effusion to r/o empyema (could cause SOB and referred R sided abd pain)  c/w zosyn for now  if patient has diarrhea would send C Diff, GI PCR, stool culture  check legionella urine antigen  PCP prophylaxis per protocol- on Bactrim 1DS tab daily  full consult note to follow tomorrow    David Bravo M.D.  [phone]  [facility], Division of Infectious Diseases  [phone]  After 5pm on weekdays and all day on weekends - please call [phone]

## 2022-02-10 NOTE — PROGRESS NOTE ADULT - ASSESSMENT
55 yo M with type 1 DM on insulin pump complicated by CVA (2015 with residual left sided numbness), ESRD s/p renal transplant, PAD s/p amputation, also with HTN, chronic lung disease on 2L intermittently admitted for hypoxia and cholecystitis.      1. Type 1 DM  Discussed with patient the importance of Carb consistent diet and exercise as tolerated.    Recommend nutritional consultation  Recommend DM education through RN staff (see physical to RN order)  Discussed glycemic goal of a1c <7% to prevent microvascular complications of diabetes mellitus and reduce the risk of macrovascular complications.   Recommend annual podiatry and ophthalmology follow up.   Glucose goal of 80-180mg/dl while in patient.   Recommend Lantus 50 units at bedtime  Recommend Admelog 5 units tid with meals  Recommend LDSS (1:50 above 150mg/dl) and night time low does sliding scale for hyperglycemia corrections    Discharge recommendation/considerations:  Likely back on insulin pump.        - please monitor fingerstick blood glucose at least 4 times a day to avoid insulin toxicity, hypoglycemia; Blood glucose target while hospitalized 140-180 mg/dL  - Carbohydrate controlled diet, no concentrated sweets  - Counseled on need for medication adherence to avoid new complications.   - hypoglycemia protocol    # Hypertension: BP goal <130/80; defer to primary team    # Chronic Kidney disease - cautious insulin titraiton; high risk for insulin stacking and hypoglycemia    DM Discharge planning  Please contact diabetes team prior to patient's discharge for finalized regimen. Please allow 24-48 hours for discharge planning.   His pump is not properly setup, but he has been managing on the pump for last 3 years. NO DKA or hospitalization, so, it would be safe to d/c him on it.    He has private endocrinologist, but he would like to follow with us in the clinic at [address] [place] [address].  Please call [phone].  We will also try to get him an appointment as he gets closer to the discharge.

## 2022-02-11 DIAGNOSIS — G47.33 OBSTRUCTIVE SLEEP APNEA (ADULT) (PEDIATRIC): ICD-10-CM

## 2022-02-11 DIAGNOSIS — R09.02 HYPOXEMIA: ICD-10-CM

## 2022-02-11 DIAGNOSIS — J90 PLEURAL EFFUSION, NOT ELSEWHERE CLASSIFIED: ICD-10-CM

## 2022-02-11 LAB
ALBUMIN SERPL ELPH-MCNC: 3.6 G/DL — SIGNIFICANT CHANGE UP (ref 3.3–5)
ALP SERPL-CCNC: 110 U/L — SIGNIFICANT CHANGE UP (ref 40–120)
ALT FLD-CCNC: 9 U/L — LOW (ref 10–45)
ANION GAP SERPL CALC-SCNC: 11 MMOL/L — SIGNIFICANT CHANGE UP (ref 5–17)
AST SERPL-CCNC: 9 U/L — LOW (ref 10–40)
BASE EXCESS BLDA CALC-SCNC: 10.9 MMOL/L — HIGH (ref -2–3)
BILIRUB SERPL-MCNC: 0.3 MG/DL — SIGNIFICANT CHANGE UP (ref 0.2–1.2)
BUN SERPL-MCNC: 17 MG/DL — SIGNIFICANT CHANGE UP (ref 7–23)
CALCIUM SERPL-MCNC: 9.6 MG/DL — SIGNIFICANT CHANGE UP (ref 8.4–10.5)
CHLORIDE SERPL-SCNC: 95 MMOL/L — LOW (ref 96–108)
CO2 BLDA-SCNC: 40 MMOL/L — HIGH (ref 19–24)
CO2 SERPL-SCNC: 31 MMOL/L — SIGNIFICANT CHANGE UP (ref 22–31)
CREAT SERPL-MCNC: 1.18 MG/DL — SIGNIFICANT CHANGE UP (ref 0.5–1.3)
GAS PNL BLDA: SIGNIFICANT CHANGE UP
GLUCOSE BLDC GLUCOMTR-MCNC: 213 MG/DL — HIGH (ref 70–99)
GLUCOSE BLDC GLUCOMTR-MCNC: 214 MG/DL — HIGH (ref 70–99)
GLUCOSE BLDC GLUCOMTR-MCNC: 229 MG/DL — HIGH (ref 70–99)
GLUCOSE BLDC GLUCOMTR-MCNC: 279 MG/DL — HIGH (ref 70–99)
GLUCOSE BLDC GLUCOMTR-MCNC: 282 MG/DL — HIGH (ref 70–99)
GLUCOSE SERPL-MCNC: 232 MG/DL — HIGH (ref 70–99)
HCO3 BLDA-SCNC: 38 MMOL/L — HIGH (ref 21–28)
HCT VFR BLD CALC: 44.8 % — SIGNIFICANT CHANGE UP (ref 39–50)
HGB BLD-MCNC: 14.2 G/DL — SIGNIFICANT CHANGE UP (ref 13–17)
HOROWITZ INDEX BLDA+IHG-RTO: 30 — SIGNIFICANT CHANGE UP
LEGIONELLA AG UR QL: NEGATIVE — SIGNIFICANT CHANGE UP
MCHC RBC-ENTMCNC: 27.8 PG — SIGNIFICANT CHANGE UP (ref 27–34)
MCHC RBC-ENTMCNC: 31.7 GM/DL — LOW (ref 32–36)
MCV RBC AUTO: 87.8 FL — SIGNIFICANT CHANGE UP (ref 80–100)
NRBC # BLD: 0 /100 WBCS — SIGNIFICANT CHANGE UP (ref 0–0)
PCO2 BLDA: 60 MMHG — HIGH (ref 35–48)
PH BLDA: 7.41 — SIGNIFICANT CHANGE UP (ref 7.35–7.45)
PLATELET # BLD AUTO: 257 K/UL — SIGNIFICANT CHANGE UP (ref 150–400)
PO2 BLDA: 83 MMHG — SIGNIFICANT CHANGE UP (ref 83–108)
POTASSIUM SERPL-MCNC: 4.6 MMOL/L — SIGNIFICANT CHANGE UP (ref 3.5–5.3)
POTASSIUM SERPL-SCNC: 4.6 MMOL/L — SIGNIFICANT CHANGE UP (ref 3.5–5.3)
PROT SERPL-MCNC: 7.1 G/DL — SIGNIFICANT CHANGE UP (ref 6–8.3)
RBC # BLD: 5.1 M/UL — SIGNIFICANT CHANGE UP (ref 4.2–5.8)
RBC # FLD: 13.6 % — SIGNIFICANT CHANGE UP (ref 10.3–14.5)
SAO2 % BLDA: 95.4 % — SIGNIFICANT CHANGE UP (ref 94–98)
SODIUM SERPL-SCNC: 137 MMOL/L — SIGNIFICANT CHANGE UP (ref 135–145)
TACROLIMUS SERPL-MCNC: 5.9 NG/ML — SIGNIFICANT CHANGE UP
WBC # BLD: 6.09 K/UL — SIGNIFICANT CHANGE UP (ref 3.8–10.5)
WBC # FLD AUTO: 6.09 K/UL — SIGNIFICANT CHANGE UP (ref 3.8–10.5)

## 2022-02-11 PROCEDURE — 99232 SBSQ HOSP IP/OBS MODERATE 35: CPT

## 2022-02-11 RX ORDER — INSULIN GLARGINE 100 [IU]/ML
54 INJECTION, SOLUTION SUBCUTANEOUS
Refills: 0 | Status: DISCONTINUED | OUTPATIENT
Start: 2022-02-11 | End: 2022-02-15

## 2022-02-11 RX ORDER — INSULIN LISPRO 100/ML
VIAL (ML) SUBCUTANEOUS
Refills: 0 | Status: DISCONTINUED | OUTPATIENT
Start: 2022-02-11 | End: 2022-02-15

## 2022-02-11 RX ORDER — INSULIN LISPRO 100/ML
6 VIAL (ML) SUBCUTANEOUS
Refills: 0 | Status: DISCONTINUED | OUTPATIENT
Start: 2022-02-11 | End: 2022-02-12

## 2022-02-11 RX ADMIN — Medication 1 TABLET(S): at 05:36

## 2022-02-11 RX ADMIN — TACROLIMUS 1.5 MILLIGRAM(S): 5 CAPSULE ORAL at 21:36

## 2022-02-11 RX ADMIN — GABAPENTIN 300 MILLIGRAM(S): 400 CAPSULE ORAL at 05:37

## 2022-02-11 RX ADMIN — Medication 0.1 MILLIGRAM(S): at 05:40

## 2022-02-11 RX ADMIN — LOSARTAN POTASSIUM 25 MILLIGRAM(S): 100 TABLET, FILM COATED ORAL at 05:37

## 2022-02-11 RX ADMIN — PIPERACILLIN AND TAZOBACTAM 25 GRAM(S): 4; .5 INJECTION, POWDER, LYOPHILIZED, FOR SOLUTION INTRAVENOUS at 05:36

## 2022-02-11 RX ADMIN — Medication 2: at 12:14

## 2022-02-11 RX ADMIN — Medication 81 MILLIGRAM(S): at 12:13

## 2022-02-11 RX ADMIN — Medication 5 UNIT(S): at 12:13

## 2022-02-11 RX ADMIN — ENOXAPARIN SODIUM 40 MILLIGRAM(S): 100 INJECTION SUBCUTANEOUS at 05:37

## 2022-02-11 RX ADMIN — PIPERACILLIN AND TAZOBACTAM 25 GRAM(S): 4; .5 INJECTION, POWDER, LYOPHILIZED, FOR SOLUTION INTRAVENOUS at 22:58

## 2022-02-11 RX ADMIN — INSULIN GLARGINE 54 UNIT(S): 100 INJECTION, SOLUTION SUBCUTANEOUS at 18:11

## 2022-02-11 RX ADMIN — Medication 25 MILLIGRAM(S): at 14:15

## 2022-02-11 RX ADMIN — Medication 25 MILLIGRAM(S): at 05:37

## 2022-02-11 RX ADMIN — ENOXAPARIN SODIUM 40 MILLIGRAM(S): 100 INJECTION SUBCUTANEOUS at 17:43

## 2022-02-11 RX ADMIN — Medication 1 TABLET(S): at 14:15

## 2022-02-11 RX ADMIN — Medication 0.1 MILLIGRAM(S): at 17:45

## 2022-02-11 RX ADMIN — TACROLIMUS 1.5 MILLIGRAM(S): 5 CAPSULE ORAL at 09:16

## 2022-02-11 RX ADMIN — FAMOTIDINE 20 MILLIGRAM(S): 10 INJECTION INTRAVENOUS at 12:13

## 2022-02-11 RX ADMIN — Medication 5 UNIT(S): at 08:53

## 2022-02-11 RX ADMIN — PIPERACILLIN AND TAZOBACTAM 25 GRAM(S): 4; .5 INJECTION, POWDER, LYOPHILIZED, FOR SOLUTION INTRAVENOUS at 14:14

## 2022-02-11 RX ADMIN — Medication 75 MILLIGRAM(S): at 05:37

## 2022-02-11 RX ADMIN — Medication 6 UNIT(S): at 17:45

## 2022-02-11 RX ADMIN — Medication 1 TABLET(S): at 12:13

## 2022-02-11 RX ADMIN — ATORVASTATIN CALCIUM 10 MILLIGRAM(S): 80 TABLET, FILM COATED ORAL at 21:36

## 2022-02-11 RX ADMIN — Medication 3: at 17:46

## 2022-02-11 RX ADMIN — GABAPENTIN 300 MILLIGRAM(S): 400 CAPSULE ORAL at 17:45

## 2022-02-11 RX ADMIN — Medication 25 MILLIGRAM(S): at 21:36

## 2022-02-11 RX ADMIN — Medication 1: at 21:53

## 2022-02-11 RX ADMIN — Medication 75 MILLIGRAM(S): at 17:44

## 2022-02-11 RX ADMIN — Medication 1 TABLET(S): at 21:36

## 2022-02-11 NOTE — CONSULT NOTE ADULT - ATTENDING COMMENTS
55 yo M PMH of ESRD s/p LDRT (2013, current baseline Cr 1.2) DM type 1 w/ on insulin pump, HTN, restrictive lung disease and CARMEN (on nocturnal NIPPV), hx of DVT (previously on Eliquis), CVA (2015) with residual left sided numbness, hx of WTC exposure with chronic hypoxic respiratory failure on 2L O2 PRN, COVID19 infection 12/2021 presents for worsen hypoxia and slight RUQ pain.    Kidney transplant with good function - please resume imuran 100mgs daily and continue tacrolimus.  Check trough in morning.  HIDA negative for cholecystitis  HYpoxia may be related to pleural effusion - suggest consulting pulmonary. May need thorocentesis.
boaz mann

## 2022-02-11 NOTE — CONSULT NOTE ADULT - PROBLEM SELECTOR RECOMMENDATION 2
CTA chest with small R effusion with assocated atelectasis  -ID recs noted, concern for possible empyema given RUQ pain with negative HIDA scan   -Will d/w interventional pulm if amendable to diagnostic thoracentesis CTA chest with small R effusion with assocated atelectasis  -ID recs noted, concern for possible empyema given RUQ pain with negative HIDA scan. Low suspicion for empyema, will hold off on thoracentesis at this time. CTA chest with small chronic R effusion with assocated atelectasis  -ID recs noted, concern for possible empyema given RUQ pain with negative HIDA scan. Low suspicion for empyema, will hold off on thoracentesis at this time.

## 2022-02-11 NOTE — CONSULT NOTE ADULT - ASSESSMENT
55 y/o male pt with right foot ulcer to subQ   - pt seen and evaluated  - ulcer noted with no signs of infection  - rec xrays of right foot to r/o osteo  - rec daily dressing changes to right foot with mupirocin during this admission  - z flow boots in bed during this admission  - follow up at [facility] upon discharge  - case discussed with patient's podiatrist Dr. Catherine

## 2022-02-11 NOTE — CONSULT NOTE ADULT - PROBLEM SELECTOR RECOMMENDATION 9
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  -ABG ordered  -Start incentive spirometry   -Wean O2 as tolerated, keep sats >90% (currently 3-4LNC) suspect mostly d/t atelectasis + small pl effusion  -On RA his o2 increases from 80% to 95% with few deep breaths   -He is only on 2LNC with his bipap machine as an outpt  -Also with documented R diaphragm dysfunction from outpt pulm recs in 2016, pt also with reported severe sleep apnea and bipap machine broken recently.  -ABG ordered  -Start incentive spirometry   -Wean O2 as tolerated, keep sats >90% (currently 3-4LNC)

## 2022-02-11 NOTE — CONSULT NOTE ADULT - SUBJECTIVE AND OBJECTIVE BOX
Patient is a 56y old  Male who presents with a chief complaint of SOB, abd pain (11 Feb 2022 15:06)      HPI:  55 yo M PMH of ESRD s/p LDRT (2013, current baseline Cr 1.2) DM type 1 w/ on insulin pump, HTN, restrictive lung disease and CARMEN (on nocturnal NIPPV), hx of DVT (previously on Eliquis), CVA (2015) with residual left sided numbness, hx of WTC exposure with chronic hypoxic respiratory failure on 2L O2 PRN, COVID19 infection 12/2021 presents for 3-4 days of hypoxia, later followed by ruq abd discomfort. He states he began experiencing SOB 4 days prior to admission, noticed his o2 sat at home was 70s-90s when it is usually in the 90s. Two days later, he began experiencing on and off 7/10, ruq discomfort, described as sharp sensation, nonradiating, associated with nausea and decreased appetite and chills and loose diarrhea (3x/day). He tolerated the pain himself and did not take anything for it, but presented to the ED because of persistent hypoxia.  Denies new LE edema, cp, documented fevers, cough, sore throat.    In the ED VSS, O2 92% on 5L, hypertensive to 160s/80s, afebrile  Meds received: zosyn iv, 1 L LR, D50 amp x 1 for hypoglycemia in the ED , tylenol 975 mg po    Podiatry consulted for right foot ulceration.       PAST MEDICAL & SURGICAL HISTORY:  Hypertension    Hyperlipidemia    Diabetes mellitus  Type 1 on insulin pump    CKD (chronic kidney disease)  Renal Transplant 2013 at [facility]    Diabetic peripheral neuropathy    Obesity (BMI 30-39.9)    CVA (cerebral vascular accident)  Wallenberg Stroke  low L body sensation  low R face sensation  decreased peripheral vision  poor balance    Squamous cell carcinoma of skin of left ear    History of DVT (deep vein thrombosis)  s/p eliquis    Recurrent squamous cell carcinoma of skin  nose, L arm    Toe infection  2007 L 4th Toe surgery-amputation secondary to osteomyelitis    Ear disease  Left inner ear surgery, 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 1000 milliLiter(s) (50 mL/Hr) IV Continuous <Continuous>  dextrose 5%. 1000 milliLiter(s) (100 mL/Hr) IV Continuous <Continuous>  dextrose 50% Injectable 25 Gram(s) IV Push once  dextrose 50% Injectable 12.5 Gram(s) IV Push once  dextrose 50% Injectable 25 Gram(s) IV Push once  enoxaparin Injectable 40 milliGRAM(s) SubCutaneous every 12 hours  famotidine    Tablet 20 milliGRAM(s) Oral daily  gabapentin 300 milliGRAM(s) Oral two times a day  glucagon  Injectable 1 milliGRAM(s) IntraMuscular once  hydrALAZINE 25 milliGRAM(s) Oral three times a day  insulin glargine Injectable (LANTUS) 54 Unit(s) SubCutaneous <User Schedule>  insulin lispro (ADMELOG) corrective regimen sliding scale   SubCutaneous three times a day before meals  insulin lispro (ADMELOG) corrective regimen sliding scale   SubCutaneous at bedtime  insulin lispro Injectable (ADMELOG) 6 Unit(s) SubCutaneous three times a day before meals  lactobacillus acidophilus 1 Tablet(s) Oral three times a day  losartan 25 milliGRAM(s) Oral daily  metoprolol tartrate 75 milliGRAM(s) Oral two times a day  mupirocin 2% Ointment 1 Application(s) Topical <User Schedule>  piperacillin/tazobactam IVPB.. 3.375 Gram(s) IV Intermittent every 8 hours  tacrolimus 1.5 milliGRAM(s) Oral two times a day  trimethoprim   80 mG/sulfamethoxazole 400 mG 1 Tablet(s) Oral daily    MEDICATIONS  (PRN):  acetaminophen     Tablet .. 650 milliGRAM(s) Oral every 6 hours PRN Temp greater or equal to 38C (100.4F), Mild Pain (1 - 3)  melatonin 3 milliGRAM(s) Oral at bedtime PRN Insomnia  ondansetron Injectable 4 milliGRAM(s) IV Push every 8 hours PRN Nausea and/or Vomiting      Allergies    No Known Allergies    Intolerances        VITALS:    Vital Signs Last 24 Hrs  T(C): 37.2 (11 Feb 2022 20:50), Max: 37.2 (11 Feb 2022 20:50)  T(F): 98.9 (11 Feb 2022 20:50), Max: 98.9 (11 Feb 2022 20:50)  HR: 75 (11 Feb 2022 20:50) (75 - 79)  BP: 147/73 (11 Feb 2022 20:50) (147/65 - 163/80)  BP(mean): --  RR: 18 (11 Feb 2022 20:50) (18 - 18)  SpO2: 94% (11 Feb 2022 20:50) (92% - 96%)    LABS:                          14.2   6.09  )-----------( 257      ( 11 Feb 2022 07:09 )             44.8       02-11    137  |  95<L>  |  17  ----------------------------<  232<H>  4.6   |  31  |  1.18    Ca    9.6      11 Feb 2022 07:07  Mg     1.8     02-10    TPro  7.1  /  Alb  3.6  /  TBili  0.3  /  DBili  x   /  AST  9<L>  /  ALT  9<L>  /  AlkPhos  110  02-11      CAPILLARY BLOOD GLUCOSE      POCT Blood Glucose.: 279 mg/dL (11 Feb 2022 21:27)  POCT Blood Glucose.: 282 mg/dL (11 Feb 2022 17:22)  POCT Blood Glucose.: 214 mg/dL (11 Feb 2022 11:56)  POCT Blood Glucose.: 229 mg/dL (11 Feb 2022 08:32)  POCT Blood Glucose.: 213 mg/dL (11 Feb 2022 05:39)          LOWER EXTREMITY PHYSICAL EXAM:    Vasular: DP/PT weakly palpable, B/L, CFT <3 seconds B/L, Temperature gradient wnl, B/L.   Neuro: Epicritic sensation diminished to the level of foot, B/L.  Musculoskeletal/Ortho: foot deformity noted on right foot with prominence plantar lateral   Skin: right diabetic foot ulcer plantar midfoot with no signs of infection, granular base, 3.5x3.5 x 0.1 cm

## 2022-02-11 NOTE — CONSULT NOTE ADULT - PROVIDER SPECIALTY LIST ADULT
Surgery
Endocrinology
Transplant Nephrology
Infectious Disease
Intervent Radiology
Podiatry
Pulmonology

## 2022-02-11 NOTE — CONSULT NOTE ADULT - CONSULT REASON
foot ulcer
Percutaneous cholecystostomy
SOB, abd pain
Cholecystitis
Renal tx
DM
pleural effusion, hypoxia

## 2022-02-11 NOTE — PROGRESS NOTE ADULT - SUBJECTIVE AND OBJECTIVE BOX
Patient is a 56y old  Male who presents with a chief complaint of SOB, abd pain (10 Feb 2022 17:17)      SUBJECTIVE / OVERNIGHT EVENTS:    Events noted.  CONSTITUTIONAL: No fever,  or fatigue  RESPIRATORY: SOB on supp O2  CARDIOVASCULAR: No chest pain, palpitations, dizziness, or leg swelling  GASTROINTESTINAL: No abdominal or epigastric pain. No nausea, vomiting.  NEUROLOGICAL: No headache    MEDICATIONS  (STANDING):  aspirin  chewable 81 milliGRAM(s) Oral daily  atorvastatin 10 milliGRAM(s) Oral at bedtime  cloNIDine 0.1 milliGRAM(s) Oral two times a day  dextrose 40% Gel 15 Gram(s) Oral once  dextrose 5%. 1000 milliLiter(s) (50 mL/Hr) IV Continuous <Continuous>  dextrose 5%. 1000 milliLiter(s) (100 mL/Hr) IV Continuous <Continuous>  dextrose 50% Injectable 25 Gram(s) IV Push once  dextrose 50% Injectable 12.5 Gram(s) IV Push once  dextrose 50% Injectable 25 Gram(s) IV Push once  enoxaparin Injectable 40 milliGRAM(s) SubCutaneous every 12 hours  famotidine    Tablet 20 milliGRAM(s) Oral daily  gabapentin 300 milliGRAM(s) Oral two times a day  glucagon  Injectable 1 milliGRAM(s) IntraMuscular once  hydrALAZINE 25 milliGRAM(s) Oral three times a day  insulin glargine Injectable (LANTUS) 50 Unit(s) SubCutaneous <User Schedule>  insulin lispro (ADMELOG) corrective regimen sliding scale   SubCutaneous at bedtime  insulin lispro Injectable (ADMELOG) 5 Unit(s) SubCutaneous three times a day before meals  lactated ringers. 1000 milliLiter(s) (75 mL/Hr) IV Continuous <Continuous>  lactobacillus acidophilus 1 Tablet(s) Oral three times a day  losartan 25 milliGRAM(s) Oral daily  metoprolol tartrate 75 milliGRAM(s) Oral two times a day  piperacillin/tazobactam IVPB.. 3.375 Gram(s) IV Intermittent every 8 hours  tacrolimus 1.5 milliGRAM(s) Oral two times a day  trimethoprim   80 mG/sulfamethoxazole 400 mG 1 Tablet(s) Oral daily    MEDICATIONS  (PRN):  acetaminophen     Tablet .. 650 milliGRAM(s) Oral every 6 hours PRN Temp greater or equal to 38C (100.4F), Mild Pain (1 - 3)  melatonin 3 milliGRAM(s) Oral at bedtime PRN Insomnia  ondansetron Injectable 4 milliGRAM(s) IV Push every 8 hours PRN Nausea and/or Vomiting        CAPILLARY BLOOD GLUCOSE      POCT Blood Glucose.: 274 mg/dL (10 Feb 2022 21:03)  POCT Blood Glucose.: 218 mg/dL (10 Feb 2022 17:39)  POCT Blood Glucose.: 170 mg/dL (10 Feb 2022 12:05)  POCT Blood Glucose.: 151 mg/dL (10 Feb 2022 08:27)  POCT Blood Glucose.: 134 mg/dL (10 Feb 2022 04:19)  POCT Blood Glucose.: 131 mg/dL (09 Feb 2022 22:52)    I&O's Summary    09 Feb 2022 07:01  -  10 Feb 2022 07:00  --------------------------------------------------------  IN: 200 mL / OUT: 0 mL / NET: 200 mL    10 Feb 2022 07:01  -  10 Feb 2022 22:17  --------------------------------------------------------  IN: 1320 mL / OUT: 0 mL / NET: 1320 mL        T(C): 36.8 (02-10-22 @ 20:22), Max: 37 (02-10-22 @ 18:00)  HR: 77 (02-10-22 @ 20:22) (76 - 84)  BP: 155/69 (02-10-22 @ 20:22) (144/68 - 155/78)  RR: 18 (02-10-22 @ 20:22) (18 - 18)  SpO2: 94% (02-10-22 @ 20:22) (93% - 95%)    PHYSICAL EXAM:  GENERAL: NAD  NECK: Supple, No JVD  CHEST/LUNG: Clear to auscultation bilaterally; Bibasilar crackles  HEART: Regular rate and rhythm; No murmurs, rubs, or gallops  ABDOMEN: Soft, Nontender, Nondistended; Bowel sounds present  EXTREMITIES:   No edema  NEUROLOGY: AAO X 3      LABS:                        14.4   6.85  )-----------( 265      ( 10 Feb 2022 09:15 )             45.6     02-10    134<L>  |  93<L>  |  18  ----------------------------<  160<H>  4.2   |  29  |  1.18    Ca    9.4      10 Feb 2022 09:15  Mg     1.8     02-10    TPro  7.1  /  Alb  3.5  /  TBili  0.7  /  DBili  x   /  AST  12  /  ALT  11  /  AlkPhos  119  02-10            CAPILLARY BLOOD GLUCOSE      POCT Blood Glucose.: 274 mg/dL (10 Feb 2022 21:03)  POCT Blood Glucose.: 218 mg/dL (10 Feb 2022 17:39)  POCT Blood Glucose.: 170 mg/dL (10 Feb 2022 12:05)  POCT Blood Glucose.: 151 mg/dL (10 Feb 2022 08:27)  POCT Blood Glucose.: 134 mg/dL (10 Feb 2022 04:19)  POCT Blood Glucose.: 131 mg/dL (09 Feb 2022 22:52)        RADIOLOGY & ADDITIONAL TESTS:    Imaging Personally Reviewed:    Consultant(s) Notes Reviewed:      Care Discussed with Consultants/Other Providers:    Dylon Herrmann MD, CMD, FACP    [address] [place] [address]  Office Tel: [phone]  Cell: [phone]

## 2022-02-11 NOTE — CONSULT NOTE ADULT - SUBJECTIVE AND OBJECTIVE BOX
[facility], Division of Infectious Diseases  MEL Mckoy S. Shah, Y. Patel, G. Casimir  [phone]  ([phone] - weekdays after 5pm and weekends)    LUTHER NORIEGA  56y, Male  [id number]    HPI--  HPI:  57 y/o M PMhx ESRD s/p LDRT (2013 on maintenance tacro and azathioprine), DM I, HTN, CARMEN, CVA (2015) w/ residual left sided numbness, COVID19 infection 12/2021 who presents w/ SOB x 2 days prior to admission. States he uses home O2 prn but had increasing SOB over the past 2 days during which he used O2 more frequently. Had had COVID infection 12/2021. Endorses chronic non productive cough following this. Additionally, reports RUQ abd discomfort for 1 day about 1 week ago, constant in intensity but resolved after that 1 day.   Denies fever, sore throat, nasal congestion, chest pain. Endorses chills.    ROS: 10 point review of systems completed, pertinent positives and negatives as per HPI.    Allergies: No Known Allergies    PMH -- Hypertension    Hyperlipidemia    Diabetes mellitus    CKD (chronic kidney disease)    Diabetic peripheral neuropathy    Obesity (BMI 30-39.9)    CVA (cerebral vascular accident)    Squamous cell carcinoma of skin of left ear    History of DVT (deep vein thrombosis)    Recurrent squamous cell carcinoma of skin      PSH -- Toe infection    Ear disease    Vasectomy status    H/O foot surgery    End-stage renal failure with renal transplant    S/P kidney transplant    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      FH -- No pertinent family history in first degree relatives    Family history of diabetes mellitus (DM)    FH: skin cancer      Social History -- denies tobacco, alcohol or illicit drug use  Travel/Environmental/Occupational History:     Physical Exam--  Vital Signs Last 24 Hrs  T(F): 98.4 (11 Feb 2022 04:29), Max: 98.6 (10 Feb 2022 18:00)  HR: 79 (11 Feb 2022 04:29) (75 - 84)  BP: 163/80 (11 Feb 2022 04:29) (144/68 - 163/80)  RR: 18 (11 Feb 2022 04:29) (18 - 18)  SpO2: 92% (11 Feb 2022 04:29) (92% - 95%)  General: nontoxic-appearing, no acute distress  HEENT: NC/AT, anicteric, oropharynx clear, dentition fair, neck supple, no frontal, maxillary or ethmoid sinus tenderness  Neck: Not rigid. No LAD  Lungs: decreased breath sounds R lung field  Heart: Regular rate and rhythm. No murmur  Abdomen: Soft. Nondistended, mild tenderness RUQ  Neuro: AAOx3, L sided numbness  Back: No costovertebral angle tenderness.  Extremities: No LE edema.   Skin: Warm. Dry. Good turgor. No rash  Psychiatric: Appropriate affect and mood for situation.   Lines:    Laboratory & Imaging Data--  CBC:                       14.2   6.09  )-----------( 257      ( 11 Feb 2022 07:09 )             44.8     WBC Count: 6.09 K/uL (02-11-22 @ 07:09)  WBC Count: 6.85 K/uL (02-10-22 @ 09:15)  WBC Count: 8.08 K/uL (02-08-22 @ 15:06)    CMP: 02-11    137  |  95<L>  |  17  ----------------------------<  232<H>  4.6   |  31  |  1.18    Ca    9.6      11 Feb 2022 07:07  Mg     1.8     02-10    TPro  7.1  /  Alb  3.6  /  TBili  0.3  /  DBili  x   /  AST  9<L>  /  ALT  9<L>  /  AlkPhos  110  02-11    LIVER FUNCTIONS - ( 11 Feb 2022 07:07 )  Alb: 3.6 g/dL / Pro: 7.1 g/dL / ALK PHOS: 110 U/L / ALT: 9 U/L / AST: 9 U/L / GGT: x             Microbiology:     Culture - Urine (collected 02-08-22 @ 21:14)  Source: Clean Catch Clean Catch (Midstream)  Final Report (02-09-22 @ 16:17):    No growth    Culture - Blood (collected 02-08-22 @ 17:35)  Source: .Blood Blood-Peripheral  Preliminary Report (02-09-22 @ 18:01):    No growth to date.    Culture - Blood (collected 02-08-22 @ 17:35)  Source: .Blood Blood-Peripheral  Preliminary Report (02-09-22 @ 18:01):    No growth to date.      Radiology--  ***  Active Medications--  acetaminophen     Tablet .. 650 milliGRAM(s) Oral every 6 hours PRN  aspirin  chewable 81 milliGRAM(s) Oral daily  atorvastatin 10 milliGRAM(s) Oral at bedtime  cloNIDine 0.1 milliGRAM(s) Oral two times a day  dextrose 40% Gel 15 Gram(s) Oral once  dextrose 5%. 1000 milliLiter(s) IV Continuous <Continuous>  dextrose 5%. 1000 milliLiter(s) IV Continuous <Continuous>  dextrose 50% Injectable 25 Gram(s) IV Push once  dextrose 50% Injectable 12.5 Gram(s) IV Push once  dextrose 50% Injectable 25 Gram(s) IV Push once  enoxaparin Injectable 40 milliGRAM(s) SubCutaneous every 12 hours  famotidine    Tablet 20 milliGRAM(s) Oral daily  gabapentin 300 milliGRAM(s) Oral two times a day  glucagon  Injectable 1 milliGRAM(s) IntraMuscular once  hydrALAZINE 25 milliGRAM(s) Oral three times a day  insulin glargine Injectable (LANTUS) 50 Unit(s) SubCutaneous <User Schedule>  insulin lispro (ADMELOG) corrective regimen sliding scale   SubCutaneous at bedtime  insulin lispro (ADMELOG) corrective regimen sliding scale   SubCutaneous three times a day before meals  insulin lispro Injectable (ADMELOG) 5 Unit(s) SubCutaneous three times a day before meals  lactobacillus acidophilus 1 Tablet(s) Oral three times a day  losartan 25 milliGRAM(s) Oral daily  melatonin 3 milliGRAM(s) Oral at bedtime PRN  metoprolol tartrate 75 milliGRAM(s) Oral two times a day  ondansetron Injectable 4 milliGRAM(s) IV Push every 8 hours PRN  piperacillin/tazobactam IVPB.. 3.375 Gram(s) IV Intermittent every 8 hours  tacrolimus 1.5 milliGRAM(s) Oral two times a day  trimethoprim   80 mG/sulfamethoxazole 400 mG 1 Tablet(s) Oral daily    Antimicrobials:   piperacillin/tazobactam IVPB.. 3.375 Gram(s) IV Intermittent every 8 hours  trimethoprim   80 mG/sulfamethoxazole 400 mG 1 Tablet(s) Oral daily    Immunologic: tacrolimus 1.5 milliGRAM(s) Oral two times a day

## 2022-02-11 NOTE — CONSULT NOTE ADULT - PROBLEM SELECTOR RECOMMENDATION 3
by hx   -Reached out to outpt pulms office (Dr. Boyce [phone]), pts settings are 24/19. Will start bipap 20/10/40% for now as our machines inpatient are usually more powerful than outpt machines. Will titrate settings as tolerated.  -Also inquired about status of his new bipap from outpt pul. They will look into it and let me know.   -Check ABG by hx   -Reached out to outpt pulms office (Dr. Boyce [phone]), pts settings are 24/19??. Will start bipap 20/10/40%. -Will titrate settings as tolerated.  -Also inquired about status of his new bipap from [facility] pul. They will look into it and let us know.   -Check ABG

## 2022-02-11 NOTE — CONSULT NOTE ADULT - ASSESSMENT
57 y/o M PMH of CARMEN on bipap qHS with 2LNC, reported restrictive lung disease (post 911), ESRD s/p LDRT (2013, current baseline Cr 1.2) DM type 1 w/ on insulin pump, HTN, DVT (previously on Eliquis), CVA (2015) with residual left sided numbness. COVID 12/2021. Presents with 3-4 days of hypoxia, later followed by RUQ abd discomfort on palpation. CTA chest neg PE - found to have small R effusion and atelectasis. CT A/P and RUQ ultrasound concerning for acute cholecystitis - HIDA scan negative. He remains hypoxia on RA with improvement in hypoxia with deep breathing.

## 2022-02-11 NOTE — PROGRESS NOTE ADULT - ASSESSMENT
55 yo M with type 1 DM on insulin pump complicated by CVA (2015 with residual left sided numbness), ESRD s/p renal transplant, PAD s/p amputation, also with HTN, chronic lung disease on 2L intermittently admitted for hypoxia and cholecystitis.      1. Type 1 DM  Discussed with patient the importance of Carb consistent diet and exercise as tolerated.    Recommend nutritional consultation  Recommend DM education through RN staff (see physical to RN order)  Discussed glycemic goal of a1c <7% to prevent microvascular complications of diabetes mellitus and reduce the risk of macrovascular complications.   Recommend annual podiatry and ophthalmology follow up.   Glucose goal of 100-180mg/dl while in patient.   Recommend Lantus 54 units at bedtime  Recommend Admelog 6 units tid with meals  Recommend LDSS (1:50 above 150mg/dl) and night time low does sliding scale for hyperglycemia corrections    Discharge recommendation/considerations:  Likely back on insulin pump.        - please monitor fingerstick blood glucose at least 4 times a day to avoid insulin toxicity, hypoglycemia; Blood glucose target while hospitalized 140-180 mg/dL  - Carbohydrate controlled diet, no concentrated sweets  - Counseled on need for medication adherence to avoid new complications.   - hypoglycemia protocol    # Hypertension: BP goal <130/80; defer to primary team    # Chronic Kidney disease - cautious insulin titraiton; high risk for insulin stacking and hypoglycemia    DM Discharge planning  Please contact diabetes team prior to patient's discharge for finalized regimen. Please allow 24-48 hours for discharge planning.   His pump is not properly setup, but he has been managing on the pump for last 3 years. NO DKA or hospitalization, so, it would be safe to d/c him on it.    He has private endocrinologist, but he would like to follow with us in the clinic at [address] [place] [address].  Please call [phone].  We will also try to get him an appointment as he gets closer to the discharge.

## 2022-02-11 NOTE — PROGRESS NOTE ADULT - PROBLEM SELECTOR PLAN 1
CTA neg for PE or PNA, but does show small to moderate R pleural effusion and atelectasis  likely compressive atelectasis due to splinting from pain associated w/ acute cholecystitis  - c/w NC 4L and wean down as tolerated  - monitor O2  - incentive spirometry and encourage use  -Pul eval called CTA neg for PE or PNA, but does show small to moderate R pleural effusion and atelectasis  likely compressive atelectasis due to splinting from pain associated w/ acute cholecystitis  - c/w NC 4L and wean down as tolerated  - monitor O2  - incentive spirometry and encourage use  -Pul eval appreciated

## 2022-02-11 NOTE — CONSULT NOTE ADULT - SUBJECTIVE AND OBJECTIVE BOX
PULMONARY CONSULT    HPI: 55 y/o M PMH of CARMEN on bipap qHS with 2LNC, reported restrictive lung disease (post 911), ESRD s/p LDRT (2013, current baseline Cr 1.2) DM type 1 w/ on insulin pump, HTN, DVT (previously on Eliquis), CVA (2015) with residual left sided numbness. COVID 12/2021. Presents with 3-4 days of hypoxia, later followed by RUQ abd discomfort on palpation. Reports his O2 was in 70s and 80s when he is usually in the 90s. CTA chest neg PE - found to have small R effusion and atelectasis. CT A/P and RUQ ultrasound concerning for acute cholecystitis - HIDA scan negative. He remains hypoxia on RA with improvement in hypoxia with deep breathing. As far as his sleep apnea - his bipap machine broke recently and he has been using his friends machine, his outpt pulm is reportedly ordering him a new machine. Denies cough, wheezing, CP.       PAST MEDICAL & SURGICAL HISTORY:  Hypertension  Hyperlipidemia  Diabetes mellitus  Type 1 on insulin pump  CKD (chronic kidney disease)  Renal Transplant 2013 at [facility]  Diabetic peripheral neuropathy  Obesity (BMI 30-39.9)  CVA (cerebral vascular accident)  [place] Stroke  low L body sensation  low R face sensation  decreased peripheral vision  poor balance  Squamous cell carcinoma of skin of left ear  History of DVT (deep vein thrombosis)  s/p eliquis  Recurrent squamous cell carcinoma of skin  nose, L arm  Toe infection  2007 L 4th Toe surgery-amputation secondary to osteomyelitis  Ear disease  Left inner ear surgery, 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, Can NOT have MRI  Vasectomy status  s/p b/l  vasectomy  H/O foot surgery  2005 - right foot - bone fracture - s/p ORIF and subsequent removal of hardware  End-stage renal failure with renal transplant  12/2013  S/P kidney transplant  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      Allergies  No Known Allergies      FAMILY HISTORY:  Family history of diabetes mellitus (DM)  FH: skin cancer      Social history: never a smoker     Review of Systems:  CONSTITUTIONAL: No fever, chills, or fatigue  EYES: No eye pain, visual disturbances, or discharge  ENMT:  No difficulty hearing, tinnitus, vertigo; No sinus or throat pain  NECK: No pain or stiffness  RESPIRATORY: Per above  CARDIOVASCULAR: No chest pain, palpitations, dizziness, or leg swelling  GASTROINTESTINAL: Per above   GENITOURINARY: No dysuria, frequency, hematuria, or incontinence  NEUROLOGICAL: No headaches, memory loss, loss of strength, numbness, or tremors  SKIN: No itching, burning, rashes, or lesions   MUSCULOSKELETAL: No joint pain or swelling; No muscle, back, or extremity pain  PSYCHIATRIC: No depression, anxiety, mood swings, or difficulty sleeping      Medications:  MEDICATIONS  (STANDING):  aspirin  chewable 81 milliGRAM(s) Oral daily  atorvastatin 10 milliGRAM(s) Oral at bedtime  cloNIDine 0.1 milliGRAM(s) Oral two times a day  dextrose 40% Gel 15 Gram(s) Oral once  dextrose 5%. 1000 milliLiter(s) (50 mL/Hr) IV Continuous <Continuous>  dextrose 5%. 1000 milliLiter(s) (100 mL/Hr) IV Continuous <Continuous>  dextrose 50% Injectable 25 Gram(s) IV Push once  dextrose 50% Injectable 12.5 Gram(s) IV Push once  dextrose 50% Injectable 25 Gram(s) IV Push once  enoxaparin Injectable 40 milliGRAM(s) SubCutaneous every 12 hours  famotidine    Tablet 20 milliGRAM(s) Oral daily  gabapentin 300 milliGRAM(s) Oral two times a day  glucagon  Injectable 1 milliGRAM(s) IntraMuscular once  hydrALAZINE 25 milliGRAM(s) Oral three times a day  insulin glargine Injectable (LANTUS) 50 Unit(s) SubCutaneous <User Schedule>  insulin lispro (ADMELOG) corrective regimen sliding scale   SubCutaneous at bedtime  insulin lispro (ADMELOG) corrective regimen sliding scale   SubCutaneous three times a day before meals  insulin lispro Injectable (ADMELOG) 5 Unit(s) SubCutaneous three times a day before meals  lactobacillus acidophilus 1 Tablet(s) Oral three times a day  losartan 25 milliGRAM(s) Oral daily  metoprolol tartrate 75 milliGRAM(s) Oral two times a day  piperacillin/tazobactam IVPB.. 3.375 Gram(s) IV Intermittent every 8 hours  tacrolimus 1.5 milliGRAM(s) Oral two times a day  trimethoprim   80 mG/sulfamethoxazole 400 mG 1 Tablet(s) Oral daily    MEDICATIONS  (PRN):  acetaminophen     Tablet .. 650 milliGRAM(s) Oral every 6 hours PRN Temp greater or equal to 38C (100.4F), Mild Pain (1 - 3)  melatonin 3 milliGRAM(s) Oral at bedtime PRN Insomnia  ondansetron Injectable 4 milliGRAM(s) IV Push every 8 hours PRN Nausea and/or Vomiting            Vital Signs Last 24 Hrs  T(C): 36.9 (11 Feb 2022 04:29), Max: 37 (10 Feb 2022 18:00)  T(F): 98.4 (11 Feb 2022 04:29), Max: 98.6 (10 Feb 2022 18:00)  HR: 79 (11 Feb 2022 04:29) (75 - 84)  BP: 163/80 (11 Feb 2022 04:29) (144/68 - 163/80)  BP(mean): --  RR: 18 (11 Feb 2022 04:29) (18 - 18)  SpO2: 92% (11 Feb 2022 04:29) (92% - 95%)            02-10 @ 07:01  -  02-11 @ 07:00  --------------------------------------------------------  IN: 1620 mL / OUT: 0 mL / NET: 1620 mL          LABS:                        14.2   6.09  )-----------( 257      ( 11 Feb 2022 07:09 )             44.8     02-11    137  |  95<L>  |  17  ----------------------------<  232<H>  4.6   |  31  |  1.18    Ca    9.6      11 Feb 2022 07:07  Mg     1.8     02-10    TPro  7.1  /  Alb  3.6  /  TBili  0.3  /  DBili  x   /  AST  9<L>  /  ALT  9<L>  /  AlkPhos  110  02-11          CAPILLARY BLOOD GLUCOSE      POCT Blood Glucose.: 229 mg/dL (11 Feb 2022 08:32)        Procalcitonin, Serum: 0.06 ng/mL (02-08-22 @ 15:06)                CULTURES:      (collected 02-08-22 @ 17:35)  Source: .Blood Blood-Peripheral  Preliminary Report (02-09-22 @ 18:01):    No growth to date.     (collected 02-08-22 @ 17:35)  Source: .Blood Blood-Peripheral  Preliminary Report (02-09-22 @ 18:01):    No growth to date.        Culture - Urine (collected 02-08-22 @ 21:14)  Source: Clean Catch Clean Catch (Midstream)  Final Report (02-09-22 @ 16:17):    No growth            Physical Examination:    General: No acute distress.      HEENT: Pupils equal, reactive to light.  Symmetric.    PULM: Clear to auscultation bilaterally, no significant sputum production    CVS: S1, S2    ABD: soft, RUQ tenderness     EXT: No edema, nontender    SKIN: Warm and well perfused, no rashes noted.    NEURO: Alert, oriented, interactive, nonfocal        RADIOLOGY REVIEWED    CT chest: < from: CT Angio Chest PE Protocol w/ IV Cont (02.08.22 @ 18:36) >  FINDINGS:    PULMONARY ANGIOGRAM: No main, central, lobar, segmental or subsegmental   pulmonary embolus.    LYMPH NODES: No mediastinal and/or hilar lymphadenopathy.    HEART/VASCULATURE: The heart size is normal. No pericardial effusion.   Coronary artery calcifications.    AIRWAYS/LUNGS/PLEURA: Central airways are patent. Right lower lobe   partial passive atelectasis. Right middle lobe, lingular and left lower   lobe linear atelectasis. Mild/moderate right pleural effusion. No   pneumothorax.    UPPER ABDOMEN: Partially imaged mildly distended gallbladder containing   hyperdensity, may represents sludge. Foci of nondependent air is also   noted likely within the gallbladder lumen. Trace pericholecystic fat   stranding. The right hemidiaphragm is elevated.    BONES/SOFT TISSUES: Degenerative changes of the bones. Left anterior   chest wall subcutaneous tissue cardiac loop recorder.    Heterogeneity of the partially imaged thyroid gland containing tiny   hypodense nodules and calcifications as on the prior study.    IMPRESSION:    No pulmonary embolus.    Partially imaged distended gallbladder containing sludge with trace   pericholecystic fat stranding. A few foci of nondependent air likely   within the gallbladder lumen. Findings are worrisome for acute   cholecystitis. Recommend further evaluation with dedicated right upper   quadrant sonogram.    Small to moderate right pleural effusion with adjacent right lower lobe   partial compressive atelectasis.      < end of copied text >     Electrodesiccation Text: The wound bed was treated with electrodesiccation after the biopsy was performed.

## 2022-02-11 NOTE — CONSULT NOTE ADULT - ASSESSMENT
57 y/o M PMhx ESRD s/p LDRT (2013 on maintenance tacro and azathioprine), DM I, HTN, CARMEN, CVA (2015) w/ residual left sided numbness, COVID19 infection 12/2021 who presents w/ SOB x 3-4 days prior to admission and RUQ abd discomfort 2 days later associated with nausea, decreased appetite, chills and loose stools.     SOB/abd pain  UA w/o pyuria, urine cx no growth  RVP neg  CXR w/o focal consolidations  CTA w/o evidence of PE; demonstrates mild/moderate right pleural effusion, distended gallbladder containing sludge with trace pericholecystic fat stranding. A few foci of nondependent air likely within the gallbladder lumen  Abd US- Mildly distended gallbladder with diffuse wall thickening containing intraluminal air and possible air within the wall  s/p surgery consult- not a surgical candidate  was planned for cholecystostomy tube placement, however, HIDA scan w/o evidence of acute cholecystitis  s/p CT abd/pelvis w/o contr, f/u official read  If CT abd/pelvis unrevealing recommend pulm evaluation & sampling R pleural effusion to r/o empyema (could cause SOB and referred R sided abd pain)  c/w zosyn for now  if patient has diarrhea would send C Diff, GI PCR, stool culture, currently denies watery stools  f/u legionella urine antigen  PCP prophylaxis per protocol- on Bactrim 1DS tab daily      David Bravo M.D.  [phone]  [facility], Division of Infectious Diseases  [phone]  After 5pm on weekdays and all day on weekends - please call [phone]  57 y/o M PMhx ESRD s/p LDRT (2013 on maintenance tacro and azathioprine), DM I, HTN, CARMEN, CVA (2015) w/ residual left sided numbness, COVID19 infection 12/2021 who presents w/ SOB x 3-4 days prior to admission and RUQ abd discomfort 2 days later associated with nausea, decreased appetite, chills and loose stools.     SOB/abd pain  UA w/o pyuria, urine cx no growth  RVP neg  CXR w/o focal consolidations  CTA w/o evidence of PE; demonstrates mild/moderate right pleural effusion, distended gallbladder containing sludge with trace pericholecystic fat stranding. A few foci of nondependent air likely within the gallbladder lumen  Abd US- Mildly distended gallbladder with diffuse wall thickening containing intraluminal air and possible air within the wall  s/p surgery consult- not a surgical candidate  was planned for cholecystostomy tube placement, however, HIDA scan w/o evidence of acute cholecystitis  s/p CT abd/pelvis w/o contr, f/u official read  If CT abd/pelvis unrevealing recommend pulm evaluation & sampling R pleural effusion to r/o empyema (could cause SOB and referred R sided abd pain); if performed would send for cell count w/ diff, gram stain, cultures  c/w zosyn for now  if patient has diarrhea would send C Diff, GI PCR, stool culture, currently denies watery stools  f/u legionella urine antigen  PCP prophylaxis per protocol- on Bactrim 1DS tab daily      David Bravo M.D.  [phone]  [facility], Division of Infectious Diseases  [phone]  After 5pm on weekdays and all day on weekends - please call [phone]

## 2022-02-11 NOTE — CHART NOTE - NSCHARTNOTEFT_GEN_A_CORE
Wound Care Team Note:    Request for wound care consult for foot wound received and referred to wound care team Podiatrists, Kiya Moore/Rodolfo/Kiet. Will defer to Podiatry for management.    Kelly Mai NP-C, [facility] [id number]

## 2022-02-11 NOTE — PROGRESS NOTE ADULT - SUBJECTIVE AND OBJECTIVE BOX
Chief Complaint:     History:    MEDICATIONS  (STANDING):  aspirin  chewable 81 milliGRAM(s) Oral daily  atorvastatin 10 milliGRAM(s) Oral at bedtime  cloNIDine 0.1 milliGRAM(s) Oral two times a day  dextrose 40% Gel 15 Gram(s) Oral once  dextrose 5%. 1000 milliLiter(s) (50 mL/Hr) IV Continuous <Continuous>  dextrose 5%. 1000 milliLiter(s) (100 mL/Hr) IV Continuous <Continuous>  dextrose 50% Injectable 25 Gram(s) IV Push once  dextrose 50% Injectable 12.5 Gram(s) IV Push once  dextrose 50% Injectable 25 Gram(s) IV Push once  enoxaparin Injectable 40 milliGRAM(s) SubCutaneous every 12 hours  famotidine    Tablet 20 milliGRAM(s) Oral daily  gabapentin 300 milliGRAM(s) Oral two times a day  glucagon  Injectable 1 milliGRAM(s) IntraMuscular once  hydrALAZINE 25 milliGRAM(s) Oral three times a day  insulin glargine Injectable (LANTUS) 50 Unit(s) SubCutaneous <User Schedule>  insulin lispro (ADMELOG) corrective regimen sliding scale   SubCutaneous at bedtime  insulin lispro (ADMELOG) corrective regimen sliding scale   SubCutaneous three times a day before meals  insulin lispro Injectable (ADMELOG) 5 Unit(s) SubCutaneous three times a day before meals  lactobacillus acidophilus 1 Tablet(s) Oral three times a day  losartan 25 milliGRAM(s) Oral daily  metoprolol tartrate 75 milliGRAM(s) Oral two times a day  piperacillin/tazobactam IVPB.. 3.375 Gram(s) IV Intermittent every 8 hours  tacrolimus 1.5 milliGRAM(s) Oral two times a day  trimethoprim   80 mG/sulfamethoxazole 400 mG 1 Tablet(s) Oral daily    MEDICATIONS  (PRN):  acetaminophen     Tablet .. 650 milliGRAM(s) Oral every 6 hours PRN Temp greater or equal to 38C (100.4F), Mild Pain (1 - 3)  melatonin 3 milliGRAM(s) Oral at bedtime PRN Insomnia  ondansetron Injectable 4 milliGRAM(s) IV Push every 8 hours PRN Nausea and/or Vomiting      Allergies    No Known Allergies    Intolerances      Review of Systems:  Constitutional: No fever  Eyes: No blurry vision  Neuro: No tremors  HEENT: No pain  Cardiovascular: No chest pain, palpitations  Respiratory: No SOB, no cough  GI: No nausea, vomiting, abdominal pain  : No dysuria  Skin: no rash  Psych: no depression  Endocrine: no polyuria, polydipsia  Hem/lymph: no swelling  Osteoporosis: no fractures    ALL OTHER SYSTEMS REVIEWED AND NEGATIVE    UNABLE TO OBTAIN    PHYSICAL EXAM:  VITALS: T(C): 36.9 (02-11-22 @ 04:29)  T(F): 98.4 (02-11-22 @ 04:29), Max: 98.6 (02-10-22 @ 18:00)  HR: 79 (02-11-22 @ 04:29) (75 - 84)  BP: 163/80 (02-11-22 @ 04:29) (144/68 - 163/80)  RR:  (18 - 18)  SpO2:  (92% - 95%)  Wt(kg): --  GENERAL: NAD, well-groomed, well-developed  EYES: No proptosis, no lid lag, anicteric  HEENT:  Atraumatic, Normocephalic, moist mucous membranes  THYROID: Normal size, no palpable nodules  RESPIRATORY: Clear to auscultation bilaterally; No rales, rhonchi, wheezing, or rubs  CARDIOVASCULAR: Regular rate and rhythm; No murmurs; no peripheral edema  GI: Soft, nontender, non distended, normal bowel sounds  SKIN: Dry, intact, No rashes or lesions  MUSCULOSKELETAL: Full range of motion, normal strength  NEURO: sensation intact, extraocular movements intact, no tremor, normal reflexes  PSYCH: Alert and oriented x 3, normal affect, normal mood  CUSHING'S SIGNS: no striae    POCT Blood Glucose.: 229 mg/dL (02-11-22 @ 08:32)  POCT Blood Glucose.: 213 mg/dL (02-11-22 @ 05:39)  POCT Blood Glucose.: 281 mg/dL (02-10-22 @ 23:46)  POCT Blood Glucose.: 274 mg/dL (02-10-22 @ 21:03)  POCT Blood Glucose.: 218 mg/dL (02-10-22 @ 17:39)  POCT Blood Glucose.: 170 mg/dL (02-10-22 @ 12:05)  POCT Blood Glucose.: 151 mg/dL (02-10-22 @ 08:27)  POCT Blood Glucose.: 134 mg/dL (02-10-22 @ 04:19)  POCT Blood Glucose.: 131 mg/dL (02-09-22 @ 22:52)  POCT Blood Glucose.: 112 mg/dL (02-09-22 @ 19:03)  POCT Blood Glucose.: 123 mg/dL (02-09-22 @ 17:20)  POCT Blood Glucose.: 154 mg/dL (02-09-22 @ 13:21)  POCT Blood Glucose.: 113 mg/dL (02-09-22 @ 07:39)  POCT Blood Glucose.: 127 mg/dL (02-09-22 @ 06:32)  POCT Blood Glucose.: 140 mg/dL (02-09-22 @ 06:18)  POCT Blood Glucose.: 61 mg/dL (02-09-22 @ 05:31)  POCT Blood Glucose.: 244 mg/dL (02-08-22 @ 14:33)      02-11    137  |  95<L>  |  17  ----------------------------<  232<H>  4.6   |  31  |  1.18    EGFR if : 80  EGFR if non : 69    Ca    9.6      02-11  Mg     1.8     02-10    TPro  7.1  /  Alb  3.6  /  TBili  0.3  /  DBili  x   /  AST  9<L>  /  ALT  9<L>  /  AlkPhos  110  02-11          Thyroid Function Tests:                           Chief Complaint: T1DM    History: No acute events. FS have been above goal.     MEDICATIONS  (STANDING):  aspirin  chewable 81 milliGRAM(s) Oral daily  atorvastatin 10 milliGRAM(s) Oral at bedtime  cloNIDine 0.1 milliGRAM(s) Oral two times a day  dextrose 40% Gel 15 Gram(s) Oral once  dextrose 5%. 1000 milliLiter(s) (50 mL/Hr) IV Continuous <Continuous>  dextrose 5%. 1000 milliLiter(s) (100 mL/Hr) IV Continuous <Continuous>  dextrose 50% Injectable 25 Gram(s) IV Push once  dextrose 50% Injectable 12.5 Gram(s) IV Push once  dextrose 50% Injectable 25 Gram(s) IV Push once  enoxaparin Injectable 40 milliGRAM(s) SubCutaneous every 12 hours  famotidine    Tablet 20 milliGRAM(s) Oral daily  gabapentin 300 milliGRAM(s) Oral two times a day  glucagon  Injectable 1 milliGRAM(s) IntraMuscular once  hydrALAZINE 25 milliGRAM(s) Oral three times a day  insulin glargine Injectable (LANTUS) 50 Unit(s) SubCutaneous <User Schedule>  insulin lispro (ADMELOG) corrective regimen sliding scale   SubCutaneous at bedtime  insulin lispro (ADMELOG) corrective regimen sliding scale   SubCutaneous three times a day before meals  insulin lispro Injectable (ADMELOG) 5 Unit(s) SubCutaneous three times a day before meals  lactobacillus acidophilus 1 Tablet(s) Oral three times a day  losartan 25 milliGRAM(s) Oral daily  metoprolol tartrate 75 milliGRAM(s) Oral two times a day  piperacillin/tazobactam IVPB.. 3.375 Gram(s) IV Intermittent every 8 hours  tacrolimus 1.5 milliGRAM(s) Oral two times a day  trimethoprim   80 mG/sulfamethoxazole 400 mG 1 Tablet(s) Oral daily    MEDICATIONS  (PRN):  acetaminophen     Tablet .. 650 milliGRAM(s) Oral every 6 hours PRN Temp greater or equal to 38C (100.4F), Mild Pain (1 - 3)  melatonin 3 milliGRAM(s) Oral at bedtime PRN Insomnia  ondansetron Injectable 4 milliGRAM(s) IV Push every 8 hours PRN Nausea and/or Vomiting      Allergies    No Known Allergies    Intolerances      Review of Systems:  Constitutional: No fever  Eyes: No blurry vision  Neuro: No tremors  HEENT: No pain  Cardiovascular: No chest pain, palpitations  Respiratory: No SOB, no cough  GI: No nausea, vomiting, abdominal pain  : No dysuria  Skin: no rash  Psych: no depression  Endocrine: no polyuria, polydipsia      ALL OTHER SYSTEMS REVIEWED AND NEGATIVE        PHYSICAL EXAM:  VITALS: T(C): 36.9 (02-11-22 @ 04:29)  T(F): 98.4 (02-11-22 @ 04:29), Max: 98.6 (02-10-22 @ 18:00)  HR: 79 (02-11-22 @ 04:29) (75 - 84)  BP: 163/80 (02-11-22 @ 04:29) (144/68 - 163/80)  RR:  (18 - 18)  SpO2:  (92% - 95%)  Wt(kg): --  GENERAL: NAD, well-groomed, well-developed  EYES: No proptosis, no lid lag, anicteric  HEENT:  Atraumatic, Normocephalic, moist mucous membranes  RESPIRATORY: Clear to auscultation bilaterally  CARDIOVASCULAR: Regular rate and rhythm  GI: Soft, nontender, non distended, normal bowel sounds  SKIN: Dry, intact, No rashes or lesions  PSYCH: Alert and oriented x 3, normal affect, normal mood      POCT Blood Glucose.: 229 mg/dL (02-11-22 @ 08:32)  POCT Blood Glucose.: 213 mg/dL (02-11-22 @ 05:39)  POCT Blood Glucose.: 281 mg/dL (02-10-22 @ 23:46)  POCT Blood Glucose.: 274 mg/dL (02-10-22 @ 21:03)  POCT Blood Glucose.: 218 mg/dL (02-10-22 @ 17:39)  POCT Blood Glucose.: 170 mg/dL (02-10-22 @ 12:05)  POCT Blood Glucose.: 151 mg/dL (02-10-22 @ 08:27)  POCT Blood Glucose.: 134 mg/dL (02-10-22 @ 04:19)  POCT Blood Glucose.: 131 mg/dL (02-09-22 @ 22:52)  POCT Blood Glucose.: 112 mg/dL (02-09-22 @ 19:03)  POCT Blood Glucose.: 123 mg/dL (02-09-22 @ 17:20)  POCT Blood Glucose.: 154 mg/dL (02-09-22 @ 13:21)  POCT Blood Glucose.: 113 mg/dL (02-09-22 @ 07:39)  POCT Blood Glucose.: 127 mg/dL (02-09-22 @ 06:32)  POCT Blood Glucose.: 140 mg/dL (02-09-22 @ 06:18)  POCT Blood Glucose.: 61 mg/dL (02-09-22 @ 05:31)  POCT Blood Glucose.: 244 mg/dL (02-08-22 @ 14:33)      02-11    137  |  95<L>  |  17  ----------------------------<  232<H>  4.6   |  31  |  1.18    EGFR if : 80  EGFR if non : 69    Ca    9.6      02-11  Mg     1.8     02-10    TPro  7.1  /  Alb  3.6  /  TBili  0.3  /  DBili  x   /  AST  9<L>  /  ALT  9<L>  /  AlkPhos  110  02-11

## 2022-02-12 LAB
GLUCOSE BLDC GLUCOMTR-MCNC: 142 MG/DL — HIGH (ref 70–99)
GLUCOSE BLDC GLUCOMTR-MCNC: 142 MG/DL — HIGH (ref 70–99)
GLUCOSE BLDC GLUCOMTR-MCNC: 155 MG/DL — HIGH (ref 70–99)
GLUCOSE BLDC GLUCOMTR-MCNC: 203 MG/DL — HIGH (ref 70–99)
GLUCOSE BLDC GLUCOMTR-MCNC: 203 MG/DL — HIGH (ref 70–99)
GLUCOSE BLDC GLUCOMTR-MCNC: 213 MG/DL — HIGH (ref 70–99)
TACROLIMUS SERPL-MCNC: 5.3 NG/ML — SIGNIFICANT CHANGE UP

## 2022-02-12 PROCEDURE — 73630 X-RAY EXAM OF FOOT: CPT | Mod: 26,RT

## 2022-02-12 PROCEDURE — 99232 SBSQ HOSP IP/OBS MODERATE 35: CPT

## 2022-02-12 RX ORDER — INSULIN LISPRO 100/ML
8 VIAL (ML) SUBCUTANEOUS
Refills: 0 | Status: DISCONTINUED | OUTPATIENT
Start: 2022-02-12 | End: 2022-02-15

## 2022-02-12 RX ORDER — MUPIROCIN 20 MG/G
1 OINTMENT TOPICAL
Refills: 0 | Status: DISCONTINUED | OUTPATIENT
Start: 2022-02-12 | End: 2022-02-15

## 2022-02-12 RX ADMIN — PIPERACILLIN AND TAZOBACTAM 25 GRAM(S): 4; .5 INJECTION, POWDER, LYOPHILIZED, FOR SOLUTION INTRAVENOUS at 21:22

## 2022-02-12 RX ADMIN — Medication 1 TABLET(S): at 21:03

## 2022-02-12 RX ADMIN — Medication 6 UNIT(S): at 09:00

## 2022-02-12 RX ADMIN — GABAPENTIN 300 MILLIGRAM(S): 400 CAPSULE ORAL at 06:58

## 2022-02-12 RX ADMIN — Medication 8 UNIT(S): at 13:03

## 2022-02-12 RX ADMIN — Medication 25 MILLIGRAM(S): at 13:05

## 2022-02-12 RX ADMIN — Medication 25 MILLIGRAM(S): at 06:58

## 2022-02-12 RX ADMIN — Medication 0.1 MILLIGRAM(S): at 06:58

## 2022-02-12 RX ADMIN — INSULIN GLARGINE 54 UNIT(S): 100 INJECTION, SOLUTION SUBCUTANEOUS at 17:56

## 2022-02-12 RX ADMIN — PIPERACILLIN AND TAZOBACTAM 25 GRAM(S): 4; .5 INJECTION, POWDER, LYOPHILIZED, FOR SOLUTION INTRAVENOUS at 06:56

## 2022-02-12 RX ADMIN — TACROLIMUS 1.5 MILLIGRAM(S): 5 CAPSULE ORAL at 09:20

## 2022-02-12 RX ADMIN — Medication 81 MILLIGRAM(S): at 12:57

## 2022-02-12 RX ADMIN — TACROLIMUS 1.5 MILLIGRAM(S): 5 CAPSULE ORAL at 21:18

## 2022-02-12 RX ADMIN — FAMOTIDINE 20 MILLIGRAM(S): 10 INJECTION INTRAVENOUS at 12:57

## 2022-02-12 RX ADMIN — ENOXAPARIN SODIUM 40 MILLIGRAM(S): 100 INJECTION SUBCUTANEOUS at 06:56

## 2022-02-12 RX ADMIN — Medication 25 MILLIGRAM(S): at 21:20

## 2022-02-12 RX ADMIN — ATORVASTATIN CALCIUM 10 MILLIGRAM(S): 80 TABLET, FILM COATED ORAL at 21:04

## 2022-02-12 RX ADMIN — Medication 1 TABLET(S): at 06:57

## 2022-02-12 RX ADMIN — ENOXAPARIN SODIUM 40 MILLIGRAM(S): 100 INJECTION SUBCUTANEOUS at 17:28

## 2022-02-12 RX ADMIN — Medication 75 MILLIGRAM(S): at 17:29

## 2022-02-12 RX ADMIN — MUPIROCIN 1 APPLICATION(S): 20 OINTMENT TOPICAL at 09:39

## 2022-02-12 RX ADMIN — Medication 1 TABLET(S): at 13:05

## 2022-02-12 RX ADMIN — Medication 2: at 09:01

## 2022-02-12 RX ADMIN — LOSARTAN POTASSIUM 25 MILLIGRAM(S): 100 TABLET, FILM COATED ORAL at 06:57

## 2022-02-12 RX ADMIN — Medication 1 TABLET(S): at 12:57

## 2022-02-12 RX ADMIN — GABAPENTIN 300 MILLIGRAM(S): 400 CAPSULE ORAL at 17:29

## 2022-02-12 RX ADMIN — Medication 8 UNIT(S): at 17:34

## 2022-02-12 RX ADMIN — Medication 0.1 MILLIGRAM(S): at 17:29

## 2022-02-12 RX ADMIN — Medication 75 MILLIGRAM(S): at 06:57

## 2022-02-12 RX ADMIN — PIPERACILLIN AND TAZOBACTAM 25 GRAM(S): 4; .5 INJECTION, POWDER, LYOPHILIZED, FOR SOLUTION INTRAVENOUS at 14:37

## 2022-02-12 NOTE — PROGRESS NOTE ADULT - PROBLEM SELECTOR PLAN 3
-Transplant nephro f/up appreciated  - c/w tacrolimus bid dosing, monitor am tacro level  - Transplant nephro f/up appreciated

## 2022-02-12 NOTE — PROGRESS NOTE ADULT - SUBJECTIVE AND OBJECTIVE BOX
[facility] DIVISION of INFECTIOUS DISEASE  Marko Saucedo MD PhD, Qiana Steven MD, Claire Castro MD, Tal Russo MD, David Bravo MD  and providing coverage with Sabrina Hinton MD and Krista Del Valle MD  Providing Infectious Disease Consultations at [facility], [facility]    Office# [phone] to schedule follow up appointments  Answering Service for urgent calls or New Consults [phone]  Cell# to text for urgent issues Marko Saucedo [phone]     infectious diseases progress note:    LUTHER NORIEGA is a 56y y. o. Male patient    No concerning overnight events, pt reports he had a great night of sleep and now feel very well    Allergies    No Known Allergies    Intolerances        ANTIBIOTICS/RELEVANT:  antimicrobials  piperacillin/tazobactam IVPB.. 3.375 Gram(s) IV Intermittent every 8 hours  trimethoprim   80 mG/sulfamethoxazole 400 mG 1 Tablet(s) Oral daily    immunologic:  tacrolimus 1.5 milliGRAM(s) Oral two times a day    OTHER:  acetaminophen     Tablet .. 650 milliGRAM(s) Oral every 6 hours PRN  aspirin  chewable 81 milliGRAM(s) Oral daily  atorvastatin 10 milliGRAM(s) Oral at bedtime  cloNIDine 0.1 milliGRAM(s) Oral two times a day  dextrose 40% Gel 15 Gram(s) Oral once  dextrose 5%. 1000 milliLiter(s) IV Continuous <Continuous>  dextrose 5%. 1000 milliLiter(s) IV Continuous <Continuous>  dextrose 50% Injectable 25 Gram(s) IV Push once  dextrose 50% Injectable 12.5 Gram(s) IV Push once  dextrose 50% Injectable 25 Gram(s) IV Push once  enoxaparin Injectable 40 milliGRAM(s) SubCutaneous every 12 hours  famotidine    Tablet 20 milliGRAM(s) Oral daily  gabapentin 300 milliGRAM(s) Oral two times a day  glucagon  Injectable 1 milliGRAM(s) IntraMuscular once  hydrALAZINE 25 milliGRAM(s) Oral three times a day  insulin glargine Injectable (LANTUS) 54 Unit(s) SubCutaneous <User Schedule>  insulin lispro (ADMELOG) corrective regimen sliding scale   SubCutaneous at bedtime  insulin lispro (ADMELOG) corrective regimen sliding scale   SubCutaneous three times a day before meals  insulin lispro Injectable (ADMELOG) 8 Unit(s) SubCutaneous three times a day before meals  lactobacillus acidophilus 1 Tablet(s) Oral three times a day  losartan 25 milliGRAM(s) Oral daily  melatonin 3 milliGRAM(s) Oral at bedtime PRN  metoprolol tartrate 75 milliGRAM(s) Oral two times a day  mupirocin 2% Ointment 1 Application(s) Topical <User Schedule>  ondansetron Injectable 4 milliGRAM(s) IV Push every 8 hours PRN      Objective:  Vital Signs Last 24 Hrs  T(C): 37.5 (12 Feb 2022 11:11), Max: 37.5 (12 Feb 2022 11:11)  T(F): 99.5 (12 Feb 2022 11:11), Max: 99.5 (12 Feb 2022 11:11)  HR: 63 (12 Feb 2022 11:11) (63 - 78)  BP: 133/71 (12 Feb 2022 11:11) (133/71 - 149/72)  BP(mean): --  RR: 18 (12 Feb 2022 11:11) (18 - 19)  SpO2: 93% (12 Feb 2022 11:11) (88% - 97%)    T(C): 37.5 (02-12-22 @ 11:11), Max: 37.5 (02-12-22 @ 11:11)  T(C): 37.5 (02-12-22 @ 11:11), Max: 37.5 (02-12-22 @ 11:11)  T(C): 37.5 (02-12-22 @ 11:11), Max: 37.8 (02-09-22 @ 02:00)    PHYSICAL EXAM:  HEENT: NC atraumatic  Neck: supple  Respiratory: no accessory muscle use, breathing comfortably  Cardiovascular: distant  Gastrointestinal: normal appearing, nondistended  Extremities: no clubbing, no cyanosis,        LABS:                          14.2   6.09  )-----------( 257      ( 11 Feb 2022 07:09 )             44.8       WBC  6.09 02-11 @ 07:09  6.85 02-10 @ 09:15  8.08 02-08 @ 15:06      02-11    137  |  95<L>  |  17  ----------------------------<  232<H>  4.6   |  31  |  1.18    Ca    9.6      11 Feb 2022 07:07    TPro  7.1  /  Alb  3.6  /  TBili  0.3  /  DBili  x   /  AST  9<L>  /  ALT  9<L>  /  AlkPhos  110  02-11      Creatinine, Serum: 1.18 mg/dL (02-11-22 @ 07:07)  Creatinine, Serum: 1.18 mg/dL (02-10-22 @ 09:15)  Creatinine, Serum: 1.19 mg/dL (02-08-22 @ 15:06)                INFLAMMATORY MARKERS  Auto Neutrophil #: 4.76 K/uL (02-10-22 @ 09:15)  Auto Lymphocyte #: 1.15 K/uL (02-10-22 @ 09:15)  Auto Neutrophil #: 5.79 K/uL (02-08-22 @ 15:06)  Auto Lymphocyte #: 1.28 K/uL (02-08-22 @ 15:06)      Auto Eosinophil #: 0.20 K/uL (02-10-22 @ 09:15)  Auto Eosinophil #: 0.14 K/uL (02-08-22 @ 15:06)        Procalcitonin, Serum: 0.06 ng/mL (02-08-22 @ 15:06)                  Activated Partial Thromboplastin Time: 28.5 sec (02-08-22 @ 15:06)  INR: 1.18 ratio (02-08-22 @ 15:06)          MICROBIOLOGY:        RADIOLOGY & ADDITIONAL STUDIES:  < from: CT Abdomen and Pelvis No Cont (02.10.22 @ 22:39) >    ACC: [id number] EXAM:  CT ABDOMEN AND PELVIS                          PROCEDURE DATE:  02/10/2022          INTERPRETATION:  CLINICAL INFORMATION: Dyspnea, right upper quadrant   abdominal pain, nausea, and diarrhea. Rule out intra-abdominal abscess.    COMPARISON: CT angiogram chest 2/8/2022, right upper quadrant ultrasound   2/8/2022, CT abdomen and pelvis 11/7/2015    CONTRAST/COMPLICATIONS:  IV Contrast: NONE  Oral Contrast: NONE  Complications: None reported at time of study completion    PROCEDURE:  CT of the Abdomen and Pelvis was performed.  Sagittal and coronal reformats were performed.    FINDINGS:  Limited evaluation of the vasculature and viscera in the absence of   intravenous contrast.    LOWER CHEST: Small right pleural effusionwith adjacent compressive   atelectasis, similar to the prior chest CT exam. Loop recorder device in   the anterior chest wall.    LIVER: Within normal limits.  BILE DUCTS: Normal caliber.  GALLBLADDER: Mildly distended gallbladder containing mildly hyperdense   material, which may reflect sludge or small stones. Blood products are   not excluded. There are a few foci of gas along the anti-dependent   portion of the gallbladder, favored to be intraluminal, though a mural   location is not excluded. Gas is noted to be present on the prior   ultrasound of 2/8/2022. Mild gallbladder wall thickening.  SPLEEN: Mild splenomegaly measuring up to 14.4 cm, previously 13.4 cm in   2015.  PANCREAS: Within normal limits.  ADRENALS: Within normal limits.  KIDNEYS/URETERS: Bilaterally atrophic native kidneys. Transplanted kidney   in the right lower quadrant. No renal stones or hydronephrosis.    BLADDER: Within normal limits.  REPRODUCTIVE ORGANS: Prostate is normal in size.    BOWEL: No bowel obstruction. Appendix is normal.  PERITONEUM: No ascites. No collection. 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  VESSELS: Atherosclerotic changes.  RETROPERITONEUM/LYMPH NODES: Nonspecific right common iliac lymph node   measuring 1.5 x 1.2 cm (series 3 image 133), previously 1.1 x 1.0 cm in   2015.  ABDOMINAL WALL: Tiny fat-containing umbilical hernia.  BONES: Degenerative changes.    IMPRESSION:  Mildly distended gallbladder with mild wall thickening and intraluminal   versus intramural gas, as identified on the prior ultrasound, potentially   reflecting emphysematous cystitis, as was questioned on the ultrasound   exam.    Findings of mesenteric panniculitis/sclerosing mesenteritis, unchanged   since 2015.    Nonspecific right common iliac lymph node, minimally increased in size   since 2015.    Unchanged small right pleural effusion.      < from: NM Hepatobiliary Imaging w/ RX (02.09.22 @ 16:00) >  EXAM:  NM HEPATOBILIARY IMG W RX                              PROCEDURE DATE:  02/09/2022          INTERPRETATION:  CLINICAL INFORMATION: 56 year old male with right upper   quadrant abdominal pain, findings suspicious for emphysematous   cholecystitis on ultrasound, referred to evaluate for acute cholecystitis.    RADIOPHARMACEUTICAL: 3.1 mCi  and 3.0 mCi Tc-99m-mebrofenin, I.V.    TECHNIQUE:  Dynamic images of the anterior abdomen were obtained for 5   minutes following radiopharmaceutical injection. Morphine for mg I.V. and   a second dose of radiopharmaceutical were administered at approximately 1   hour after the first radiopharmaceutical injection. Dynamic imaging of   the abdomen was performed in the anterior projection for 1 hour followed   by static images of the abdomen in the anterior, right anterior oblique   and right lateral projections    COMPARISON: No prior hepatobiliary scan.    FINDINGS: There is prompt, homogeneous uptake of radiopharmaceutical by   the hepatocytes. Bowel activity is noted on the image obtained 1 hour   after the first radiopharmaceutical injection. Activity is first seen in   the gallbladder at about 65 minutes, 5 minutes after morphine   administration. There is good clearance of activity from the liver at the   end of the study.    IMPRESSION: Normal morphine-augmented hepatobiliary scan. No radionuclide   evidence of acute cholecystitis.

## 2022-02-12 NOTE — PROGRESS NOTE ADULT - ASSESSMENT
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    1.s/p LDRT in 2013-  allograft function is good.   2. IS meds- continue tac 1.5 mg po bid ( goal level is 4-6) ,  imuran 100 mg daily  3. RUQ abdominal pain - Abd US with  Mildly distended gallbladder with diffuse wall thickening containing intraluminal air and possible air within the wall. CT showed Mildly distended gallbladder with mild wall thickening and intraluminal versus intramural gas, as identified on the prior ultrasound, potentially reflecting emphysematous cystitis. HIDA-negative. s/p surgery consult- not a surgical candidate  4. Acute on chronic respiratory failure with hypoxia. CTA neg for PE or PNA, but does show small to moderate R pleural effusion and atelectasis  likely compressive atelectasis due to splinting from pain associated w/ acute cholecystitis.   Currently on NC 4L, saturating at 97%

## 2022-02-12 NOTE — PROGRESS NOTE ADULT - SUBJECTIVE AND OBJECTIVE BOX
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 1000 milliLiter(s) (50 mL/Hr) IV Continuous <Continuous>  dextrose 5%. 1000 milliLiter(s) (100 mL/Hr) IV Continuous <Continuous>  dextrose 50% Injectable 25 Gram(s) IV Push once  dextrose 50% Injectable 12.5 Gram(s) IV Push once  dextrose 50% Injectable 25 Gram(s) IV Push once  enoxaparin Injectable 40 milliGRAM(s) SubCutaneous every 12 hours  famotidine    Tablet 20 milliGRAM(s) Oral daily  gabapentin 300 milliGRAM(s) Oral two times a day  glucagon  Injectable 1 milliGRAM(s) IntraMuscular once  hydrALAZINE 25 milliGRAM(s) Oral three times a day  insulin glargine Injectable (LANTUS) 54 Unit(s) SubCutaneous <User Schedule>  insulin lispro (ADMELOG) corrective regimen sliding scale   SubCutaneous at bedtime  insulin lispro (ADMELOG) corrective regimen sliding scale   SubCutaneous three times a day before meals  insulin lispro Injectable (ADMELOG) 6 Unit(s) SubCutaneous three times a day before meals  lactobacillus acidophilus 1 Tablet(s) Oral three times a day  losartan 25 milliGRAM(s) Oral daily  metoprolol tartrate 75 milliGRAM(s) Oral two times a day  mupirocin 2% Ointment 1 Application(s) Topical <User Schedule>  piperacillin/tazobactam IVPB.. 3.375 Gram(s) IV Intermittent every 8 hours  tacrolimus 1.5 milliGRAM(s) Oral two times a day  trimethoprim   80 mG/sulfamethoxazole 400 mG 1 Tablet(s) Oral daily    MEDICATIONS  (PRN):  acetaminophen     Tablet .. 650 milliGRAM(s) Oral every 6 hours PRN Temp greater or equal to 38C (100.4F), Mild Pain (1 - 3)  melatonin 3 milliGRAM(s) Oral at bedtime PRN Insomnia  ondansetron Injectable 4 milliGRAM(s) IV Push every 8 hours PRN Nausea and/or Vomiting    Vital Signs Last 24 Hrs  T(C): 37.5 (12 Feb 2022 11:11), Max: 37.5 (12 Feb 2022 11:11)  T(F): 99.5 (12 Feb 2022 11:11), Max: 99.5 (12 Feb 2022 11:11)  HR: 63 (12 Feb 2022 11:11) (63 - 78)  BP: 133/71 (12 Feb 2022 11:11) (133/71 - 149/72)  BP(mean): --  RR: 18 (12 Feb 2022 11:11) (18 - 19)  SpO2: 93% (12 Feb 2022 11:11) (88% - 97%)    I&O's Summary    11 Feb 2022 07:01  -  12 Feb 2022 07:00  --------------------------------------------------------  IN: 660 mL / OUT: 0 mL / NET: 660 mL          Physical Exam:   GENERAL: NAD, well-groomed, well-developed  HEENT: DANIEL/   Atraumatic, Normocephalic  ENMT: No tonsillar erythema, exudates, or enlargement; Moist mucous membranes, Good dentition, No lesions  NECK: Supple, No JVD, Normal thyroid  CHEST/LUNG: scattered crackles   CVS: Regular rate and rhythm; No murmurs, rubs, or gallops  GI: : Soft, Nontender, Nondistended; Bowel sounds present  NERVOUS SYSTEM:  Alert & Oriented X3  EXTREMITIES:  - edema  LYMPH: No lymphadenopathy noted  SKIN: No rashes or lesions  ENDOCRINOLOGY: No Thyromegaly  PSYCH: Appropriate    Labs:  ABG - ( 11 Feb 2022 11:31 )  pH, Arterial: 7.41  pH, Blood: x     /  pCO2: 60    /  pO2: 83    / HCO3: 38    / Base Excess: 10.9  /  SaO2: 95.4            38                            14.2   6.09  )-----------( 257      ( 11 Feb 2022 07:09 )             44.8                         14.4   6.85  )-----------( 265      ( 10 Feb 2022 09:15 )             45.6                         13.5   8.08  )-----------( 255      ( 08 Feb 2022 15:06 )             42.7     02-11    137  |  95<L>  |  17  ----------------------------<  232<H>  4.6   |  31  |  1.18  02-10    134<L>  |  93<L>  |  18  ----------------------------<  160<H>  4.2   |  29  |  1.18  02-08    136  |  96  |  16  ----------------------------<  252<H>  4.6   |  31  |  1.19    Ca    9.6      11 Feb 2022 07:07    TPro  7.1  /  Alb  3.6  /  TBili  0.3  /  DBili  x   /  AST  9<L>  /  ALT  9<L>  /  AlkPhos  110  02-11  TPro  7.1  /  Alb  3.5  /  TBili  0.7  /  DBili  x   /  AST  12  /  ALT  11  /  AlkPhos  119  02-10  TPro  7.2  /  Alb  3.6  /  TBili  0.4  /  DBili  x   /  AST  14  /  ALT  13  /  AlkPhos  136<H>  02-08    CAPILLARY BLOOD GLUCOSE      POCT Blood Glucose.: 213 mg/dL (12 Feb 2022 11:02)  POCT Blood Glucose.: 203 mg/dL (12 Feb 2022 08:21)  POCT Blood Glucose.: 203 mg/dL (12 Feb 2022 07:25)  POCT Blood Glucose.: 279 mg/dL (11 Feb 2022 21:27)  POCT Blood Glucose.: 282 mg/dL (11 Feb 2022 17:22)      LIVER FUNCTIONS - ( 11 Feb 2022 07:07 )  Alb: 3.6 g/dL / Pro: 7.1 g/dL / ALK PHOS: 110 U/L / ALT: 9 U/L / AST: 9 U/L / GGT: x               Procalcitonin, Serum: 0.06 ng/mL (02-08 @ 15:06)        RECENT CULTURES:  02-08 @ 21:14 Clean Catch Clean Catch (Midstream)         < from: CT Abdomen and Pelvis No Cont (02.10.22 @ 22:39) >  2015.  ABDOMINAL WALL: Tiny fat-containing umbilical hernia.  BONES: Degenerative changes.    IMPRESSION:  Mildly distended gallbladder with mild wall thickening and intraluminal   versus intramural gas, as identified on the prior ultrasound, potentially   reflecting emphysematous cystitis, as was questioned on the ultrasound   exam.    Findings of mesenteric panniculitis/sclerosing mesenteritis, unchanged   since 2015.    Nonspecific right common iliac lymph node, minimally increased in size   since 2015.    Unchanged small right pleural effusion.    --- End of Report ---          HAYLEE ALVAREZ MD; Resident Radiologist  This document has been electronically signed.  ANNIKA FERRER MD; Attending Radiologist  This document has been electronically signed. Feb 11 2022  3:27PM    < end of copied text >         No growth    02-08 @ 17:35 .Blood Blood-Peripheral                No growth to date.          RESPIRATORY CULTURES:          Studies  Chest X-RAY  CT SCAN Chest   Venous Dopplers: LE:   CT Abdomen  Others

## 2022-02-12 NOTE — PROGRESS NOTE ADULT - SUBJECTIVE AND OBJECTIVE BOX
[facility] DIVISION OF KIDNEY DISEASES AND HYPERTENSION -- FOLLOW UP NOTE  --------------------------------------------------------------------------------  Chief Complaint:    24 hour events/subjective:  Patient was seen and examined at bedside        PAST HISTORY  --------------------------------------------------------------------------------  No significant changes to PMH, PSH, FHx, SHx, unless otherwise noted    ALLERGIES & MEDICATIONS  --------------------------------------------------------------------------------  Allergies    No Known Allergies    Intolerances      Standing Inpatient Medications  aspirin  chewable 81 milliGRAM(s) Oral daily  atorvastatin 10 milliGRAM(s) Oral at bedtime  cloNIDine 0.1 milliGRAM(s) Oral two times a day  dextrose 40% Gel 15 Gram(s) Oral once  dextrose 5%. 1000 milliLiter(s) IV Continuous <Continuous>  dextrose 5%. 1000 milliLiter(s) IV Continuous <Continuous>  dextrose 50% Injectable 25 Gram(s) IV Push once  dextrose 50% Injectable 12.5 Gram(s) IV Push once  dextrose 50% Injectable 25 Gram(s) IV Push once  enoxaparin Injectable 40 milliGRAM(s) SubCutaneous every 12 hours  famotidine    Tablet 20 milliGRAM(s) Oral daily  gabapentin 300 milliGRAM(s) Oral two times a day  glucagon  Injectable 1 milliGRAM(s) IntraMuscular once  hydrALAZINE 25 milliGRAM(s) Oral three times a day  insulin glargine Injectable (LANTUS) 54 Unit(s) SubCutaneous <User Schedule>  insulin lispro (ADMELOG) corrective regimen sliding scale   SubCutaneous at bedtime  insulin lispro (ADMELOG) corrective regimen sliding scale   SubCutaneous three times a day before meals  insulin lispro Injectable (ADMELOG) 8 Unit(s) SubCutaneous three times a day before meals  lactobacillus acidophilus 1 Tablet(s) Oral three times a day  losartan 25 milliGRAM(s) Oral daily  metoprolol tartrate 75 milliGRAM(s) Oral two times a day  mupirocin 2% Ointment 1 Application(s) Topical <User Schedule>  piperacillin/tazobactam IVPB.. 3.375 Gram(s) IV Intermittent every 8 hours  tacrolimus 1.5 milliGRAM(s) Oral two times a day  trimethoprim   80 mG/sulfamethoxazole 400 mG 1 Tablet(s) Oral daily    PRN Inpatient Medications  acetaminophen     Tablet .. 650 milliGRAM(s) Oral every 6 hours PRN  melatonin 3 milliGRAM(s) Oral at bedtime PRN  ondansetron Injectable 4 milliGRAM(s) IV Push every 8 hours PRN      REVIEW OF SYSTEMS  --------------------------------------------------------------------------------  Gen: No fatigue, fevers/chills, weakness  Skin: No rashes  Head/Eyes/Ears/Mouth: No headache;No sore throat  Respiratory: +dyspnea  CV: No chest pain, PND, orthopnea  GI: No abdominal pain, diarrhea, constipation, nausea, vomiting  Transplant: No pain  : No increased frequency, dysuria, hematuria, nocturia  MSK: No joint pain/swelling; no back pain; no edema  Neuro: No dizziness/lightheadedness, weakness, seizures, numbness, tingling  Psych: No significant nervousness, anxiety, stress, depression    All other systems were reviewed and are negative, except as noted.    VITALS/PHYSICAL EXAM  --------------------------------------------------------------------------------  T(C): 37.5 (02-12-22 @ 11:11), Max: 37.5 (02-12-22 @ 11:11)  HR: 63 (02-12-22 @ 11:11) (63 - 78)  BP: 133/71 (02-12-22 @ 11:11) (133/71 - 149/72)  RR: 18 (02-12-22 @ 11:11) (18 - 19)  SpO2: 93% (02-12-22 @ 11:11) (88% - 97%)  Wt(kg): --        02-11-22 @ 07:01  -  02-12-22 @ 07:00  --------------------------------------------------------  IN: 660 mL / OUT: 0 mL / NET: 660 mL      Physical Exam:  	Gen: NAD  	HEENT: PERRL, supple neck, clear oropharynx  	Pulm: dull BS on lung base, R>L  	CV: RRR, S1S2; no rub  	Back: No spinal or CVA tenderness; no sacral edema  	Abd: +BS, soft, nontender/nondistended                      Transplant: No tenderness, swelling  	: No suprapubic tenderness  	UE: Warm, FROM; no edema; no asterixis  	LE: Warm, FROM; no edema  	Neuro: No focal deficits  	Psych: Normal affect and mood  	Skin: Warm, without rashes      LABS/STUDIES  --------------------------------------------------------------------------------              14.2   6.09  >-----------<  257      [02-11-22 @ 07:09]              44.8     137  |  95  |  17  ----------------------------<  232      [02-11-22 @ 07:07]  4.6   |  31  |  1.18        Ca     9.6     [02-11-22 @ 07:07]    TPro  7.1  /  Alb  3.6  /  TBili  0.3  /  DBili  x   /  AST  9   /  ALT  9   /  AlkPhos  110  [02-11-22 @ 07:07]        Creatinine Trend:  SCr 1.18 [02-11 @ 07:07]  SCr 1.18 [02-10 @ 09:15]  SCr 1.19 [02-08 @ 15:06]    Tacrolimus (), Serum: 5.3 ng/mL (02-12 @ 11:28)  Tacrolimus (), Serum: 5.9 ng/mL (02-11 @ 10:25)  Tacrolimus (), Serum: 6.7 ng/mL (02-10 @ 10:25)  Tacrolimus (), Serum: 7.8 ng/mL (02-08 @ 23:55)            Urinalysis - [02-08-22 @ 17:38]      Color Yellow / Appearance Clear / SG 1.023 / pH 6.0      Gluc 100 mg/dL / Ketone Negative  / Bili Negative / Urobili 3 mg/dL       Blood Small / Protein 100 / Leuk Est Negative / Nitrite Negative      RBC 18 / WBC 3 / Hyaline 2 / Gran  / Sq Epi  / Non Sq Epi 1 / Bacteria Negative      HbA1c 7.1      [12-17-19 @ 10:59]

## 2022-02-12 NOTE — PROGRESS NOTE ADULT - ASSESSMENT
55 y/o M PMhx ESRD s/p LDRT (2013 on maintenance tacro and azathioprine), DM I, HTN, CARMEN, CVA (2015) w/ residual left sided numbness, COVID19 infection 12/2021 who presented w/ SOB x 3-4 days prior to admission and RUQ abd discomfort 2 days later associated with nausea, decreased appetite, chills and loose stools.     Concern for Cholecystitis  Abd US- Mildly distended gallbladder with diffuse wall thickening containing intraluminal air and possible air within the wall  CT-Mildly distended gallbladder with mild wall thickening and intraluminal   versus intramural gas, as identified on the prior ultrasound, potentially   reflecting emphysematous cystitis  HIDA-negative  s/p surgery consult- not a surgical candidate    low procalcitonin, no leukocytosis, afebrile-unless there is more compelling evidence to suggest acute infectious process hope to limit abx    PCP prophylaxis per protocol- on Bactrim 1DS tab daily    We will follow along in the care of this patient. Please call us at [phone] or text me directly on my cell# at [phone] with any concerns.

## 2022-02-12 NOTE — PROGRESS NOTE ADULT - ASSESSMENT
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 COVID 12/2021. Presents with 3-4 days of hypoxia, later followed by RUQ abd discomfort on palpation. CTA chest neg PE - found to have small R effusion and atelectasis. CT A/P and RUQ ultrasound concerning for acute cholecystitis - HIDA scan negative. He remains hypoxia on RA with improvement in hypoxia with deep breathing.

## 2022-02-12 NOTE — PROGRESS NOTE ADULT - ASSESSMENT
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

## 2022-02-12 NOTE — PROGRESS NOTE ADULT - SUBJECTIVE AND OBJECTIVE BOX
Patient is a 56y old  Male who presents with a chief complaint of SOB, abd pain (12 Feb 2022 14:05)      SUBJECTIVE / OVERNIGHT EVENTS:    Events noted.  CONSTITUTIONAL: No fever,  or fatigue  RESPIRATORY: On supp O2  CARDIOVASCULAR: No chest pain, palpitations  GASTROINTESTINAL: No abdominal or epigastric pain.      MEDICATIONS  (STANDING):  aspirin  chewable 81 milliGRAM(s) Oral daily  atorvastatin 10 milliGRAM(s) Oral at bedtime  cloNIDine 0.1 milliGRAM(s) Oral two times a day  dextrose 40% Gel 15 Gram(s) Oral once  dextrose 5%. 1000 milliLiter(s) (50 mL/Hr) IV Continuous <Continuous>  dextrose 5%. 1000 milliLiter(s) (100 mL/Hr) IV Continuous <Continuous>  dextrose 50% Injectable 25 Gram(s) IV Push once  dextrose 50% Injectable 12.5 Gram(s) IV Push once  dextrose 50% Injectable 25 Gram(s) IV Push once  enoxaparin Injectable 40 milliGRAM(s) SubCutaneous every 12 hours  famotidine    Tablet 20 milliGRAM(s) Oral daily  gabapentin 300 milliGRAM(s) Oral two times a day  glucagon  Injectable 1 milliGRAM(s) IntraMuscular once  hydrALAZINE 25 milliGRAM(s) Oral three times a day  insulin glargine Injectable (LANTUS) 54 Unit(s) SubCutaneous <User Schedule>  insulin lispro (ADMELOG) corrective regimen sliding scale   SubCutaneous three times a day before meals  insulin lispro (ADMELOG) corrective regimen sliding scale   SubCutaneous at bedtime  insulin lispro Injectable (ADMELOG) 8 Unit(s) SubCutaneous three times a day before meals  lactobacillus acidophilus 1 Tablet(s) Oral three times a day  losartan 25 milliGRAM(s) Oral daily  metoprolol tartrate 75 milliGRAM(s) Oral two times a day  mupirocin 2% Ointment 1 Application(s) Topical <User Schedule>  piperacillin/tazobactam IVPB.. 3.375 Gram(s) IV Intermittent every 8 hours  tacrolimus 1.5 milliGRAM(s) Oral two times a day  trimethoprim   80 mG/sulfamethoxazole 400 mG 1 Tablet(s) Oral daily    MEDICATIONS  (PRN):  acetaminophen     Tablet .. 650 milliGRAM(s) Oral every 6 hours PRN Temp greater or equal to 38C (100.4F), Mild Pain (1 - 3)  melatonin 3 milliGRAM(s) Oral at bedtime PRN Insomnia  ondansetron Injectable 4 milliGRAM(s) IV Push every 8 hours PRN Nausea and/or Vomiting        CAPILLARY BLOOD GLUCOSE      POCT Blood Glucose.: 142 mg/dL (12 Feb 2022 17:27)  POCT Blood Glucose.: 142 mg/dL (12 Feb 2022 12:54)  POCT Blood Glucose.: 213 mg/dL (12 Feb 2022 11:02)  POCT Blood Glucose.: 203 mg/dL (12 Feb 2022 08:21)  POCT Blood Glucose.: 203 mg/dL (12 Feb 2022 07:25)  POCT Blood Glucose.: 279 mg/dL (11 Feb 2022 21:27)    I&O's Summary    11 Feb 2022 07:01  -  12 Feb 2022 07:00  --------------------------------------------------------  IN: 660 mL / OUT: 0 mL / NET: 660 mL        T(C): 37.5 (02-12-22 @ 11:11), Max: 37.5 (02-12-22 @ 11:11)  HR: 63 (02-12-22 @ 11:11) (63 - 78)  BP: 133/71 (02-12-22 @ 11:11) (133/71 - 149/72)  RR: 18 (02-12-22 @ 11:11) (18 - 19)  SpO2: 93% (02-12-22 @ 11:11) (88% - 97%)    PHYSICAL EXAM:    NECK: Supple, No JVD  CHEST/LUNG: Clear to auscultation bilaterally; No wheezing.  HEART: Regular rate and rhythm; No murmurs, rubs, or gallops  ABDOMEN: Soft, Nontender, Nondistended; Bowel sounds present  EXTREMITIES:   No edema  NEUROLOGY: AAO      LABS:                        14.2   6.09  )-----------( 257      ( 11 Feb 2022 07:09 )             44.8     02-11    137  |  95<L>  |  17  ----------------------------<  232<H>  4.6   |  31  |  1.18    Ca    9.6      11 Feb 2022 07:07    TPro  7.1  /  Alb  3.6  /  TBili  0.3  /  DBili  x   /  AST  9<L>  /  ALT  9<L>  /  AlkPhos  110  02-11            CAPILLARY BLOOD GLUCOSE      POCT Blood Glucose.: 142 mg/dL (12 Feb 2022 17:27)  POCT Blood Glucose.: 142 mg/dL (12 Feb 2022 12:54)  POCT Blood Glucose.: 213 mg/dL (12 Feb 2022 11:02)  POCT Blood Glucose.: 203 mg/dL (12 Feb 2022 08:21)  POCT Blood Glucose.: 203 mg/dL (12 Feb 2022 07:25)  POCT Blood Glucose.: 279 mg/dL (11 Feb 2022 21:27)        RADIOLOGY & ADDITIONAL TESTS:    Imaging Personally Reviewed:    Consultant(s) Notes Reviewed:      Care Discussed with Consultants/Other Providers:    Dylon Herrmann MD, CMD, FACP    [address] [place] [address]  Office Tel: [phone]  Cell: [phone]

## 2022-02-13 LAB
CULTURE RESULTS: SIGNIFICANT CHANGE UP
CULTURE RESULTS: SIGNIFICANT CHANGE UP
GLUCOSE BLDC GLUCOMTR-MCNC: 108 MG/DL — HIGH (ref 70–99)
GLUCOSE BLDC GLUCOMTR-MCNC: 137 MG/DL — HIGH (ref 70–99)
GLUCOSE BLDC GLUCOMTR-MCNC: 144 MG/DL — HIGH (ref 70–99)
GLUCOSE BLDC GLUCOMTR-MCNC: 153 MG/DL — HIGH (ref 70–99)
SPECIMEN SOURCE: SIGNIFICANT CHANGE UP
SPECIMEN SOURCE: SIGNIFICANT CHANGE UP
TACROLIMUS SERPL-MCNC: 4.2 NG/ML — SIGNIFICANT CHANGE UP

## 2022-02-13 PROCEDURE — 99232 SBSQ HOSP IP/OBS MODERATE 35: CPT

## 2022-02-13 RX ADMIN — Medication 25 MILLIGRAM(S): at 21:38

## 2022-02-13 RX ADMIN — Medication 8 UNIT(S): at 09:09

## 2022-02-13 RX ADMIN — Medication 1 TABLET(S): at 06:05

## 2022-02-13 RX ADMIN — Medication 75 MILLIGRAM(S): at 06:06

## 2022-02-13 RX ADMIN — INSULIN GLARGINE 54 UNIT(S): 100 INJECTION, SOLUTION SUBCUTANEOUS at 18:37

## 2022-02-13 RX ADMIN — MUPIROCIN 1 APPLICATION(S): 20 OINTMENT TOPICAL at 16:07

## 2022-02-13 RX ADMIN — TACROLIMUS 1.5 MILLIGRAM(S): 5 CAPSULE ORAL at 09:57

## 2022-02-13 RX ADMIN — Medication 25 MILLIGRAM(S): at 14:43

## 2022-02-13 RX ADMIN — Medication 1: at 13:15

## 2022-02-13 RX ADMIN — Medication 75 MILLIGRAM(S): at 17:09

## 2022-02-13 RX ADMIN — Medication 81 MILLIGRAM(S): at 11:06

## 2022-02-13 RX ADMIN — ENOXAPARIN SODIUM 40 MILLIGRAM(S): 100 INJECTION SUBCUTANEOUS at 17:09

## 2022-02-13 RX ADMIN — Medication 8 UNIT(S): at 17:51

## 2022-02-13 RX ADMIN — GABAPENTIN 300 MILLIGRAM(S): 400 CAPSULE ORAL at 17:09

## 2022-02-13 RX ADMIN — Medication 0.1 MILLIGRAM(S): at 17:09

## 2022-02-13 RX ADMIN — PIPERACILLIN AND TAZOBACTAM 25 GRAM(S): 4; .5 INJECTION, POWDER, LYOPHILIZED, FOR SOLUTION INTRAVENOUS at 14:43

## 2022-02-13 RX ADMIN — Medication 25 MILLIGRAM(S): at 06:06

## 2022-02-13 RX ADMIN — Medication 1 TABLET(S): at 14:43

## 2022-02-13 RX ADMIN — Medication 1 TABLET(S): at 11:07

## 2022-02-13 RX ADMIN — LOSARTAN POTASSIUM 25 MILLIGRAM(S): 100 TABLET, FILM COATED ORAL at 06:05

## 2022-02-13 RX ADMIN — ENOXAPARIN SODIUM 40 MILLIGRAM(S): 100 INJECTION SUBCUTANEOUS at 06:06

## 2022-02-13 RX ADMIN — ATORVASTATIN CALCIUM 10 MILLIGRAM(S): 80 TABLET, FILM COATED ORAL at 21:38

## 2022-02-13 RX ADMIN — PIPERACILLIN AND TAZOBACTAM 25 GRAM(S): 4; .5 INJECTION, POWDER, LYOPHILIZED, FOR SOLUTION INTRAVENOUS at 21:38

## 2022-02-13 RX ADMIN — PIPERACILLIN AND TAZOBACTAM 25 GRAM(S): 4; .5 INJECTION, POWDER, LYOPHILIZED, FOR SOLUTION INTRAVENOUS at 06:05

## 2022-02-13 RX ADMIN — FAMOTIDINE 20 MILLIGRAM(S): 10 INJECTION INTRAVENOUS at 11:06

## 2022-02-13 RX ADMIN — Medication 1 TABLET(S): at 21:37

## 2022-02-13 RX ADMIN — TACROLIMUS 1.5 MILLIGRAM(S): 5 CAPSULE ORAL at 21:37

## 2022-02-13 RX ADMIN — GABAPENTIN 300 MILLIGRAM(S): 400 CAPSULE ORAL at 06:05

## 2022-02-13 RX ADMIN — Medication 0.1 MILLIGRAM(S): at 06:06

## 2022-02-13 RX ADMIN — Medication 8 UNIT(S): at 13:16

## 2022-02-13 NOTE — PROGRESS NOTE ADULT - PROBLEM SELECTOR PLAN 6
DVT PPX: lovenox   no PT needs
DVT PPX: lovenox BID given BMI  no PT needs
DVT PPX: lovenox BID given BMI  no PT needs  Dispo: awaiting IR eval, c/w medical management of cholecystitis  GOC d/w ptn x 45 min: full code
DVT PPX: lovenox BID given BMI  no PT needs
DVT PPX: lovenox   no PT needs

## 2022-02-13 NOTE — PROGRESS NOTE ADULT - SUBJECTIVE AND OBJECTIVE BOX
[facility] DIVISION OF KIDNEY DISEASES AND HYPERTENSION -- FOLLOW UP NOTE  --------------------------------------------------------------------------------  Chief Complaint:    24 hour events/subjective:  Patient was seen and examined at bedside      PAST HISTORY  --------------------------------------------------------------------------------  No significant changes to PMH, PSH, FHx, SHx, unless otherwise noted    ALLERGIES & MEDICATIONS  --------------------------------------------------------------------------------  Allergies    No Known Allergies    Intolerances      Standing Inpatient Medications  aspirin  chewable 81 milliGRAM(s) Oral daily  atorvastatin 10 milliGRAM(s) Oral at bedtime  cloNIDine 0.1 milliGRAM(s) Oral two times a day  dextrose 40% Gel 15 Gram(s) Oral once  dextrose 5%. 1000 milliLiter(s) IV Continuous <Continuous>  dextrose 5%. 1000 milliLiter(s) IV Continuous <Continuous>  dextrose 50% Injectable 25 Gram(s) IV Push once  dextrose 50% Injectable 12.5 Gram(s) IV Push once  dextrose 50% Injectable 25 Gram(s) IV Push once  enoxaparin Injectable 40 milliGRAM(s) SubCutaneous every 12 hours  famotidine    Tablet 20 milliGRAM(s) Oral daily  gabapentin 300 milliGRAM(s) Oral two times a day  glucagon  Injectable 1 milliGRAM(s) IntraMuscular once  hydrALAZINE 25 milliGRAM(s) Oral three times a day  insulin glargine Injectable (LANTUS) 54 Unit(s) SubCutaneous <User Schedule>  insulin lispro (ADMELOG) corrective regimen sliding scale   SubCutaneous at bedtime  insulin lispro (ADMELOG) corrective regimen sliding scale   SubCutaneous three times a day before meals  insulin lispro Injectable (ADMELOG) 8 Unit(s) SubCutaneous three times a day before meals  lactobacillus acidophilus 1 Tablet(s) Oral three times a day  losartan 25 milliGRAM(s) Oral daily  metoprolol tartrate 75 milliGRAM(s) Oral two times a day  mupirocin 2% Ointment 1 Application(s) Topical <User Schedule>  piperacillin/tazobactam IVPB.. 3.375 Gram(s) IV Intermittent every 8 hours  tacrolimus 1.5 milliGRAM(s) Oral two times a day  trimethoprim   80 mG/sulfamethoxazole 400 mG 1 Tablet(s) Oral daily    PRN Inpatient Medications  acetaminophen     Tablet .. 650 milliGRAM(s) Oral every 6 hours PRN  melatonin 3 milliGRAM(s) Oral at bedtime PRN  ondansetron Injectable 4 milliGRAM(s) IV Push every 8 hours PRN      REVIEW OF SYSTEMS  --------------------------------------------------------------------------------  Gen: No fatigue, fevers/chills, weakness  Skin: No rashes  Head/Eyes/Ears/Mouth: No headache;No sore throat  Respiratory: No dyspnea, cough,   CV: No chest pain, PND, orthopnea  GI: No abdominal pain, diarrhea, constipation, nausea, vomiting  Transplant: No pain  : No increased frequency, dysuria, hematuria, nocturia  MSK: No joint pain/swelling; no back pain; no edema  Neuro: No dizziness/lightheadedness, weakness, seizures, numbness, tingling  Psych: No significant nervousness, anxiety, stress, depression    All other systems were reviewed and are negative, except as noted.    VITALS/PHYSICAL EXAM  --------------------------------------------------------------------------------  T(C): 36.8 (02-13-22 @ 05:15), Max: 37.1 (02-12-22 @ 20:43)  HR: 74 (02-13-22 @ 09:07) (60 - 76)  BP: 142/74 (02-13-22 @ 05:15) (142/74 - 146/76)  RR: 18 (02-13-22 @ 05:15) (18 - 19)  SpO2: 97% (02-13-22 @ 09:07) (97% - 98%)  Wt(kg): --        02-12-22 @ 07:01  -  02-13-22 @ 07:00  --------------------------------------------------------  IN: 240 mL / OUT: 0 mL / NET: 240 mL      Physical Exam:  	Gen: NAD  	HEENT: PERRL, supple neck, clear oropharynx  	Pulm: CTA B/L  	CV: RRR, S1S2; no rub  	Back: No spinal or CVA tenderness; no sacral edema  	Abd: +BS, soft, nontender/nondistended                      Transplant: No tenderness, swelling  	: No suprapubic tenderness  	UE: Warm, FROM; no edema; no asterixis  	LE: Warm, FROM; no edema  	Neuro: No focal deficits  	Psych: Normal affect and mood  	Skin: Warm, without rashes      LABS/STUDIES  --------------------------------------------------------------------------------            Creatinine Trend:  SCr 1.18 [02-11 @ 07:07]  SCr 1.18 [02-10 @ 09:15]  SCr 1.19 [02-08 @ 15:06]    Tacrolimus (), Serum: 5.3 ng/mL (02-12 @ 11:28)  Tacrolimus (), Serum: 5.9 ng/mL (02-11 @ 10:25)  Tacrolimus (), Serum: 6.7 ng/mL (02-10 @ 10:25)  Tacrolimus (), Serum: 7.8 ng/mL (02-08 @ 23:55)            Urinalysis - [02-08-22 @ 17:38]      Color Yellow / Appearance Clear / SG 1.023 / pH 6.0      Gluc 100 mg/dL / Ketone Negative  / Bili Negative / Urobili 3 mg/dL       Blood Small / Protein 100 / Leuk Est Negative / Nitrite Negative      RBC 18 / WBC 3 / Hyaline 2 / Gran  / Sq Epi  / Non Sq Epi 1 / Bacteria Negative      HbA1c 7.1      [12-17-19 @ 10:59]

## 2022-02-13 NOTE — PROGRESS NOTE ADULT - SUBJECTIVE AND OBJECTIVE BOX
Patient is a 56y old  Male who presents with a chief complaint of SOB, abd pain (12 Feb 2022 14:05)      SUBJECTIVE / OVERNIGHT EVENTS:    Events noted.  CONSTITUTIONAL: No fever,  or fatigue  RESPIRATORY: On supp O2  CARDIOVASCULAR: No chest pain, palpitations  GASTROINTESTINAL: No abdominal or epigastric pain.      MEDICATIONS  (STANDING):  aspirin  chewable 81 milliGRAM(s) Oral daily  atorvastatin 10 milliGRAM(s) Oral at bedtime  cloNIDine 0.1 milliGRAM(s) Oral two times a day  dextrose 40% Gel 15 Gram(s) Oral once  dextrose 5%. 1000 milliLiter(s) (50 mL/Hr) IV Continuous <Continuous>  dextrose 5%. 1000 milliLiter(s) (100 mL/Hr) IV Continuous <Continuous>  dextrose 50% Injectable 25 Gram(s) IV Push once  dextrose 50% Injectable 12.5 Gram(s) IV Push once  dextrose 50% Injectable 25 Gram(s) IV Push once  enoxaparin Injectable 40 milliGRAM(s) SubCutaneous every 12 hours  famotidine    Tablet 20 milliGRAM(s) Oral daily  gabapentin 300 milliGRAM(s) Oral two times a day  glucagon  Injectable 1 milliGRAM(s) IntraMuscular once  hydrALAZINE 25 milliGRAM(s) Oral three times a day  insulin glargine Injectable (LANTUS) 54 Unit(s) SubCutaneous <User Schedule>  insulin lispro (ADMELOG) corrective regimen sliding scale   SubCutaneous three times a day before meals  insulin lispro (ADMELOG) corrective regimen sliding scale   SubCutaneous at bedtime  insulin lispro Injectable (ADMELOG) 8 Unit(s) SubCutaneous three times a day before meals  lactobacillus acidophilus 1 Tablet(s) Oral three times a day  losartan 25 milliGRAM(s) Oral daily  metoprolol tartrate 75 milliGRAM(s) Oral two times a day  mupirocin 2% Ointment 1 Application(s) Topical <User Schedule>  piperacillin/tazobactam IVPB.. 3.375 Gram(s) IV Intermittent every 8 hours  tacrolimus 1.5 milliGRAM(s) Oral two times a day  trimethoprim   80 mG/sulfamethoxazole 400 mG 1 Tablet(s) Oral daily    MEDICATIONS  (PRN):  acetaminophen     Tablet .. 650 milliGRAM(s) Oral every 6 hours PRN Temp greater or equal to 38C (100.4F), Mild Pain (1 - 3)  melatonin 3 milliGRAM(s) Oral at bedtime PRN Insomnia  ondansetron Injectable 4 milliGRAM(s) IV Push every 8 hours PRN Nausea and/or Vomiting        CAPILLARY BLOOD GLUCOSE      POCT Blood Glucose.: 142 mg/dL (12 Feb 2022 17:27)  POCT Blood Glucose.: 142 mg/dL (12 Feb 2022 12:54)  POCT Blood Glucose.: 213 mg/dL (12 Feb 2022 11:02)  POCT Blood Glucose.: 203 mg/dL (12 Feb 2022 08:21)  POCT Blood Glucose.: 203 mg/dL (12 Feb 2022 07:25)  POCT Blood Glucose.: 279 mg/dL (11 Feb 2022 21:27)    I&O's Summary    11 Feb 2022 07:01  -  12 Feb 2022 07:00  --------------------------------------------------------  IN: 660 mL / OUT: 0 mL / NET: 660 mL        T(C): 37.5 (02-12-22 @ 11:11), Max: 37.5 (02-12-22 @ 11:11)  HR: 63 (02-12-22 @ 11:11) (63 - 78)  BP: 133/71 (02-12-22 @ 11:11) (133/71 - 149/72)  RR: 18 (02-12-22 @ 11:11) (18 - 19)  SpO2: 93% (02-12-22 @ 11:11) (88% - 97%)    PHYSICAL EXAM:    NECK: Supple, No JVD  CHEST/LUNG: Clear to auscultation bilaterally; No wheezing.  HEART: Regular rate and rhythm; No murmurs, rubs, or gallops  ABDOMEN: Soft, Nontender, Nondistended; Bowel sounds present  EXTREMITIES:   No edema  NEUROLOGY: AAO      LABS:                        14.2   6.09  )-----------( 257      ( 11 Feb 2022 07:09 )             44.8     02-11    137  |  95<L>  |  17  ----------------------------<  232<H>  4.6   |  31  |  1.18    Ca    9.6      11 Feb 2022 07:07    TPro  7.1  /  Alb  3.6  /  TBili  0.3  /  DBili  x   /  AST  9<L>  /  ALT  9<L>  /  AlkPhos  110  02-11            CAPILLARY BLOOD GLUCOSE      POCT Blood Glucose.: 142 mg/dL (12 Feb 2022 17:27)  POCT Blood Glucose.: 142 mg/dL (12 Feb 2022 12:54)  POCT Blood Glucose.: 213 mg/dL (12 Feb 2022 11:02)  POCT Blood Glucose.: 203 mg/dL (12 Feb 2022 08:21)  POCT Blood Glucose.: 203 mg/dL (12 Feb 2022 07:25)  POCT Blood Glucose.: 279 mg/dL (11 Feb 2022 21:27)        RADIOLOGY & ADDITIONAL TESTS:    Imaging Personally Reviewed:    Consultant(s) Notes Reviewed:      Care Discussed with Consultants/Other Providers:    Dylon Herrmann MD, CMD, FACP    [address] [place] [address]  Office Tel: [phone]  Cell: [phone]

## 2022-02-13 NOTE — PROGRESS NOTE ADULT - ASSESSMENT
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    1.s/p LDRT in 2013-  allograft function is good.   2. IS meds- continue tac 1.5 mg po bid ( goal level is 4-6) ,  imuran 100 mg daily  3. RUQ abdominal pain - Abd US with  Mildly distended gallbladder with diffuse wall thickening containing intraluminal air and possible air within the wall. CT showed Mildly distended gallbladder with mild wall thickening and intraluminal versus intramural gas, as identified on the prior ultrasound, potentially reflecting emphysematous cystitis. HIDA-negative. s/p surgery consult- not a surgical candidate  4. Acute on chronic respiratory failure with hypoxia. CTA neg for PE or PNA, but does show small to moderate R pleural effusion and atelectasis  likely compressive atelectasis due to splinting from pain associated w/ acute cholecystitis.   Currently on NC 4L, saturating at 97%       56 year old male with PMH of ESRD s/p LDRT in 2013  DM type 1 on insulin pump, HTN, restrictive lung disease and CARMEN (on nocturnal NIPPV), hx of DVT (previously on Eliquis), CVA (2015) with residual left sided numbness, hx of WTC exposure with chronic hypoxic respiratory failure on 2L O2 PRN, COVID19 infection 12/2021 presents for worsen hypoxia and slight RUQ pain.    1.s/p LDRT in 2013-  allograft function is good. No labs today   2. IS meds- continue tac 1.5 mg po bid ( goal level is 4-6) ,  imuran 100 mg daily  3. RUQ abdominal pain - Abd US with  Mildly distended gallbladder with diffuse wall thickening containing intraluminal air and possible air within the wall. CT showed Mildly distended gallbladder with mild wall thickening and intraluminal versus intramural gas, as identified on the prior ultrasound, potentially reflecting emphysematous cystitis. HIDA-negative. s/p surgery consult- not a surgical candidate  4. Acute on chronic respiratory failure with hypoxia. CTA neg for PE or PNA, but does show small to moderate R pleural effusion and atelectasis likely compressive atelectasis due to splinting from pain associated w/ acute cholecystitis. Currently on NC 4L, saturating at 98%

## 2022-02-14 ENCOUNTER — TRANSCRIPTION ENCOUNTER (OUTPATIENT)
Age: 57
End: 2022-02-14

## 2022-02-14 LAB
ANION GAP SERPL CALC-SCNC: 9 MMOL/L — SIGNIFICANT CHANGE UP (ref 5–17)
BUN SERPL-MCNC: 20 MG/DL — SIGNIFICANT CHANGE UP (ref 7–23)
CALCIUM SERPL-MCNC: 9.7 MG/DL — SIGNIFICANT CHANGE UP (ref 8.4–10.5)
CHLORIDE SERPL-SCNC: 99 MMOL/L — SIGNIFICANT CHANGE UP (ref 96–108)
CO2 SERPL-SCNC: 33 MMOL/L — HIGH (ref 22–31)
CREAT SERPL-MCNC: 1.44 MG/DL — HIGH (ref 0.5–1.3)
GLUCOSE BLDC GLUCOMTR-MCNC: 131 MG/DL — HIGH (ref 70–99)
GLUCOSE BLDC GLUCOMTR-MCNC: 136 MG/DL — HIGH (ref 70–99)
GLUCOSE BLDC GLUCOMTR-MCNC: 197 MG/DL — HIGH (ref 70–99)
GLUCOSE BLDC GLUCOMTR-MCNC: 243 MG/DL — HIGH (ref 70–99)
GLUCOSE SERPL-MCNC: 132 MG/DL — HIGH (ref 70–99)
HCT VFR BLD CALC: 43.4 % — SIGNIFICANT CHANGE UP (ref 39–50)
HGB BLD-MCNC: 13.4 G/DL — SIGNIFICANT CHANGE UP (ref 13–17)
MAGNESIUM SERPL-MCNC: 2.1 MG/DL — SIGNIFICANT CHANGE UP (ref 1.6–2.6)
MCHC RBC-ENTMCNC: 27.7 PG — SIGNIFICANT CHANGE UP (ref 27–34)
MCHC RBC-ENTMCNC: 30.9 GM/DL — LOW (ref 32–36)
MCV RBC AUTO: 89.9 FL — SIGNIFICANT CHANGE UP (ref 80–100)
NRBC # BLD: 0 /100 WBCS — SIGNIFICANT CHANGE UP (ref 0–0)
PLATELET # BLD AUTO: 244 K/UL — SIGNIFICANT CHANGE UP (ref 150–400)
POTASSIUM SERPL-MCNC: 4.5 MMOL/L — SIGNIFICANT CHANGE UP (ref 3.5–5.3)
POTASSIUM SERPL-SCNC: 4.5 MMOL/L — SIGNIFICANT CHANGE UP (ref 3.5–5.3)
RBC # BLD: 4.83 M/UL — SIGNIFICANT CHANGE UP (ref 4.2–5.8)
RBC # FLD: 14 % — SIGNIFICANT CHANGE UP (ref 10.3–14.5)
SODIUM SERPL-SCNC: 141 MMOL/L — SIGNIFICANT CHANGE UP (ref 135–145)
TACROLIMUS SERPL-MCNC: 5.6 NG/ML — SIGNIFICANT CHANGE UP
WBC # BLD: 6.49 K/UL — SIGNIFICANT CHANGE UP (ref 3.8–10.5)
WBC # FLD AUTO: 6.49 K/UL — SIGNIFICANT CHANGE UP (ref 3.8–10.5)

## 2022-02-14 PROCEDURE — 99232 SBSQ HOSP IP/OBS MODERATE 35: CPT

## 2022-02-14 PROCEDURE — 71045 X-RAY EXAM CHEST 1 VIEW: CPT | Mod: 26

## 2022-02-14 PROCEDURE — 99232 SBSQ HOSP IP/OBS MODERATE 35: CPT | Mod: GC

## 2022-02-14 RX ADMIN — GABAPENTIN 300 MILLIGRAM(S): 400 CAPSULE ORAL at 17:36

## 2022-02-14 RX ADMIN — ATORVASTATIN CALCIUM 10 MILLIGRAM(S): 80 TABLET, FILM COATED ORAL at 21:15

## 2022-02-14 RX ADMIN — Medication 25 MILLIGRAM(S): at 05:29

## 2022-02-14 RX ADMIN — Medication 1 TABLET(S): at 05:30

## 2022-02-14 RX ADMIN — Medication 8 UNIT(S): at 08:21

## 2022-02-14 RX ADMIN — Medication 1 TABLET(S): at 11:33

## 2022-02-14 RX ADMIN — Medication 8 UNIT(S): at 17:58

## 2022-02-14 RX ADMIN — ENOXAPARIN SODIUM 40 MILLIGRAM(S): 100 INJECTION SUBCUTANEOUS at 05:28

## 2022-02-14 RX ADMIN — Medication 1: at 17:58

## 2022-02-14 RX ADMIN — Medication 0.1 MILLIGRAM(S): at 05:29

## 2022-02-14 RX ADMIN — Medication 25 MILLIGRAM(S): at 21:15

## 2022-02-14 RX ADMIN — Medication 0.1 MILLIGRAM(S): at 17:36

## 2022-02-14 RX ADMIN — Medication 75 MILLIGRAM(S): at 17:35

## 2022-02-14 RX ADMIN — FAMOTIDINE 20 MILLIGRAM(S): 10 INJECTION INTRAVENOUS at 11:33

## 2022-02-14 RX ADMIN — INSULIN GLARGINE 54 UNIT(S): 100 INJECTION, SOLUTION SUBCUTANEOUS at 17:58

## 2022-02-14 RX ADMIN — GABAPENTIN 300 MILLIGRAM(S): 400 CAPSULE ORAL at 05:28

## 2022-02-14 RX ADMIN — Medication 81 MILLIGRAM(S): at 11:34

## 2022-02-14 RX ADMIN — MUPIROCIN 1 APPLICATION(S): 20 OINTMENT TOPICAL at 11:34

## 2022-02-14 RX ADMIN — ENOXAPARIN SODIUM 40 MILLIGRAM(S): 100 INJECTION SUBCUTANEOUS at 17:35

## 2022-02-14 RX ADMIN — Medication 1 TABLET(S): at 21:14

## 2022-02-14 RX ADMIN — LOSARTAN POTASSIUM 25 MILLIGRAM(S): 100 TABLET, FILM COATED ORAL at 05:44

## 2022-02-14 RX ADMIN — TACROLIMUS 1.5 MILLIGRAM(S): 5 CAPSULE ORAL at 21:14

## 2022-02-14 RX ADMIN — Medication 8 UNIT(S): at 12:47

## 2022-02-14 RX ADMIN — Medication 1 TABLET(S): at 15:01

## 2022-02-14 RX ADMIN — TACROLIMUS 1.5 MILLIGRAM(S): 5 CAPSULE ORAL at 09:37

## 2022-02-14 RX ADMIN — PIPERACILLIN AND TAZOBACTAM 25 GRAM(S): 4; .5 INJECTION, POWDER, LYOPHILIZED, FOR SOLUTION INTRAVENOUS at 05:28

## 2022-02-14 RX ADMIN — Medication 75 MILLIGRAM(S): at 05:28

## 2022-02-14 RX ADMIN — Medication 25 MILLIGRAM(S): at 15:02

## 2022-02-14 NOTE — PROVIDER CONTACT NOTE (OTHER) - ACTION/TREATMENT ORDERED:
D50 amp given as emergency medication while NP was at bedside. Insulin pump interrogated by NP at bedside, documentation in chart. Insulin pump education given to pt.  FS x2 s/p amp >100. NP aware.
Will follow up
see H and P

## 2022-02-14 NOTE — PROGRESS NOTE ADULT - ASSESSMENT
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    1.s/p LDRT in 2013-  allograft function is slightly down. Baseline SCr is 1.1-1.2. SCr today is 1.4 likely in the setting of SRI (received 2 CTAs, one here  one at OSH).   Send UA, urine electrolytes, spot urine TP/CR. Check bladder scan & Transplant kidney duplex. Recommend Benitez catheter placement if retaining. Would give IVF NS for now. Hold losartan. Monitor labs and urine output. Avoid NSAIDs, ACEI/ARBS, RCA and nephrotoxins. Dose medications as per eGFR.      2. IS meds- Tac level pending. continue tac 1.5 mg po bid ( goal level is 4-6) ,  imuran 100 mg daily  3. RUQ abdominal pain - Abd US with  Mildly distended gallbladder with diffuse wall thickening containing intraluminal air and possible air within the wall. CT showed Mildly distended gallbladder with mild wall thickening and intraluminal versus intramural gas, as identified on the prior ultrasound, potentially reflecting emphysematous cystitis. HIDA-negative for acute fish. s/p surgery consult- not a surgical candidate  4. Acute on chronic respiratory failure with hypoxia. CTA neg for PE or PNA, but does show small to moderate R pleural effusion and atelectasis likely compressive atelectasis  Currently on NC 4L, saturating at 98%

## 2022-02-14 NOTE — PROGRESS NOTE ADULT - SUBJECTIVE AND OBJECTIVE BOX
[facility], Division of Infectious Diseases  MEL Mckoy Y. Patel, S. Shah, G. Casimir  [phone]  ([phone] - weekdays after 5pm and weekends)    Name: LUTHER NORIEGA  Age/Gender: 56y Male  MRN: [id number]    Interval History:  Patient seen this morning.   Notes reviewed.   No concerning overnight events.  Afebrile.   states he feels much better since admission    Allergies: No Known Allergies      Objective:  Vitals:   T(F): 97.5 (02-14-22 @ 04:44), Max: 98.7 (02-13-22 @ 20:42)  HR: 64 (02-14-22 @ 09:52) (57 - 75)  BP: 138/84 (02-14-22 @ 04:44) (138/84 - 154/79)  RR: 18 (02-14-22 @ 05:34) (17 - 18)  SpO2: 95% (02-14-22 @ 09:52) (95% - 98%)  Physical Examination:  General: no acute distress, nontoxic appearing  HEENT: anicteric  Cardio: S1, S2 present, normal rate  Resp: decreased breath sounds R lung base  Abd: soft, ND, NT  Ext: no edema  Skin: warm, dry, no visible rash   Lines:    Laboratory Studies:  CBC:                       13.4   6.49  )-----------( 244      ( 14 Feb 2022 08:53 )             43.4     WBC Trend:  6.49 02-14-22 @ 08:53  6.09 02-11-22 @ 07:09  6.85 02-10-22 @ 09:15  8.08 02-08-22 @ 15:06    CMP: 02-14    141  |  99  |  20  ----------------------------<  132<H>  4.5   |  33<H>  |  1.44<H>    Ca    9.7      14 Feb 2022 08:53  Mg     2.1     02-14              Microbiology: reviewed     Culture - Urine (collected 02-08-22 @ 21:14)  Source: Clean Catch Clean Catch (Midstream)  Final Report (02-09-22 @ 16:17):    No growth    Culture - Blood (collected 02-08-22 @ 17:35)  Source: .Blood Blood-Peripheral  Final Report (02-13-22 @ 18:00):    No Growth Final    Culture - Blood (collected 02-08-22 @ 17:35)  Source: .Blood Blood-Peripheral  Final Report (02-13-22 @ 18:00):    No Growth Final      Radiology: reviewed     Medications:  acetaminophen     Tablet .. 650 milliGRAM(s) Oral every 6 hours PRN  aspirin  chewable 81 milliGRAM(s) Oral daily  atorvastatin 10 milliGRAM(s) Oral at bedtime  cloNIDine 0.1 milliGRAM(s) Oral two times a day  dextrose 40% Gel 15 Gram(s) Oral once  dextrose 5%. 1000 milliLiter(s) IV Continuous <Continuous>  dextrose 5%. 1000 milliLiter(s) IV Continuous <Continuous>  dextrose 50% Injectable 25 Gram(s) IV Push once  dextrose 50% Injectable 12.5 Gram(s) IV Push once  dextrose 50% Injectable 25 Gram(s) IV Push once  enoxaparin Injectable 40 milliGRAM(s) SubCutaneous every 12 hours  famotidine    Tablet 20 milliGRAM(s) Oral daily  gabapentin 300 milliGRAM(s) Oral two times a day  glucagon  Injectable 1 milliGRAM(s) IntraMuscular once  hydrALAZINE 25 milliGRAM(s) Oral three times a day  insulin glargine Injectable (LANTUS) 54 Unit(s) SubCutaneous <User Schedule>  insulin lispro (ADMELOG) corrective regimen sliding scale   SubCutaneous at bedtime  insulin lispro (ADMELOG) corrective regimen sliding scale   SubCutaneous three times a day before meals  insulin lispro Injectable (ADMELOG) 8 Unit(s) SubCutaneous three times a day before meals  lactobacillus acidophilus 1 Tablet(s) Oral three times a day  losartan 25 milliGRAM(s) Oral daily  melatonin 3 milliGRAM(s) Oral at bedtime PRN  metoprolol tartrate 75 milliGRAM(s) Oral two times a day  mupirocin 2% Ointment 1 Application(s) Topical <User Schedule>  ondansetron Injectable 4 milliGRAM(s) IV Push every 8 hours PRN  piperacillin/tazobactam IVPB.. 3.375 Gram(s) IV Intermittent every 8 hours  tacrolimus 1.5 milliGRAM(s) Oral two times a day  trimethoprim   80 mG/sulfamethoxazole 400 mG 1 Tablet(s) Oral daily    Antimicrobials:  piperacillin/tazobactam IVPB.. 3.375 Gram(s) IV Intermittent every 8 hours  trimethoprim   80 mG/sulfamethoxazole 400 mG 1 Tablet(s) Oral daily

## 2022-02-14 NOTE — PROGRESS NOTE ADULT - SUBJECTIVE AND OBJECTIVE BOX
Patient is a 56y old  Male who presents with a chief complaint of SOB, abd pain (14 Feb 2022 18:26)      SUBJECTIVE / OVERNIGHT EVENTS:    Events noted.  CONSTITUTIONAL: No fever,  or fatigue  RESPIRATORY: On supp O2  CARDIOVASCULAR: No chest pain, palpitations, dizziness, or leg swelling  GASTROINTESTINAL: No abdominal or epigastric pain. No nausea, vomiting.  NEUROLOGICAL: No headache    MEDICATIONS  (STANDING):  aspirin  chewable 81 milliGRAM(s) Oral daily  atorvastatin 10 milliGRAM(s) Oral at bedtime  cloNIDine 0.1 milliGRAM(s) Oral two times a day  dextrose 40% Gel 15 Gram(s) Oral once  dextrose 5%. 1000 milliLiter(s) (50 mL/Hr) IV Continuous <Continuous>  dextrose 5%. 1000 milliLiter(s) (100 mL/Hr) IV Continuous <Continuous>  dextrose 50% Injectable 25 Gram(s) IV Push once  dextrose 50% Injectable 12.5 Gram(s) IV Push once  dextrose 50% Injectable 25 Gram(s) IV Push once  enoxaparin Injectable 40 milliGRAM(s) SubCutaneous every 12 hours  famotidine    Tablet 20 milliGRAM(s) Oral daily  gabapentin 300 milliGRAM(s) Oral two times a day  glucagon  Injectable 1 milliGRAM(s) IntraMuscular once  hydrALAZINE 25 milliGRAM(s) Oral three times a day  insulin glargine Injectable (LANTUS) 54 Unit(s) SubCutaneous <User Schedule>  insulin lispro (ADMELOG) corrective regimen sliding scale   SubCutaneous three times a day before meals  insulin lispro (ADMELOG) corrective regimen sliding scale   SubCutaneous at bedtime  insulin lispro Injectable (ADMELOG) 8 Unit(s) SubCutaneous three times a day before meals  lactobacillus acidophilus 1 Tablet(s) Oral three times a day  losartan 25 milliGRAM(s) Oral daily  metoprolol tartrate 75 milliGRAM(s) Oral two times a day  mupirocin 2% Ointment 1 Application(s) Topical <User Schedule>  tacrolimus 1.5 milliGRAM(s) Oral two times a day  trimethoprim   80 mG/sulfamethoxazole 400 mG 1 Tablet(s) Oral daily    MEDICATIONS  (PRN):  acetaminophen     Tablet .. 650 milliGRAM(s) Oral every 6 hours PRN Temp greater or equal to 38C (100.4F), Mild Pain (1 - 3)  melatonin 3 milliGRAM(s) Oral at bedtime PRN Insomnia  ondansetron Injectable 4 milliGRAM(s) IV Push every 8 hours PRN Nausea and/or Vomiting        CAPILLARY BLOOD GLUCOSE      POCT Blood Glucose.: 243 mg/dL (14 Feb 2022 21:14)  POCT Blood Glucose.: 197 mg/dL (14 Feb 2022 17:43)  POCT Blood Glucose.: 131 mg/dL (14 Feb 2022 12:43)  POCT Blood Glucose.: 136 mg/dL (14 Feb 2022 08:17)    I&O's Summary    13 Feb 2022 07:01  -  14 Feb 2022 07:00  --------------------------------------------------------  IN: 1080 mL / OUT: 450 mL / NET: 630 mL    14 Feb 2022 07:01  -  14 Feb 2022 21:52  --------------------------------------------------------  IN: 600 mL / OUT: 0 mL / NET: 600 mL        T(C): 36.7 (02-14-22 @ 19:59), Max: 36.9 (02-14-22 @ 15:58)  HR: 62 (02-14-22 @ 19:59) (57 - 72)  BP: 134/72 (02-14-22 @ 19:59) (119/71 - 138/84)  RR: 18 (02-14-22 @ 19:59) (18 - 18)  SpO2: 93% (02-14-22 @ 19:59) (93% - 98%)    PHYSICAL EXAM:    NECK: Supple, No JVD  CHEST/LUNG: Clear to auscultation bilaterally; No wheezing.  HEART: Regular rate and rhythm; No murmurs, rubs, or gallops  ABDOMEN: Soft, Nontender, Nondistended; Bowel sounds present  EXTREMITIES:   No edema  NEUROLOGY: AAO X 3      LABS:                        13.4   6.49  )-----------( 244      ( 14 Feb 2022 08:53 )             43.4     02-14    141  |  99  |  20  ----------------------------<  132<H>  4.5   |  33<H>  |  1.44<H>    Ca    9.7      14 Feb 2022 08:53  Mg     2.1     02-14              CAPILLARY BLOOD GLUCOSE      POCT Blood Glucose.: 243 mg/dL (14 Feb 2022 21:14)  POCT Blood Glucose.: 197 mg/dL (14 Feb 2022 17:43)  POCT Blood Glucose.: 131 mg/dL (14 Feb 2022 12:43)  POCT Blood Glucose.: 136 mg/dL (14 Feb 2022 08:17)        RADIOLOGY & ADDITIONAL TESTS:    Imaging Personally Reviewed:    Consultant(s) Notes Reviewed:      Care Discussed with Consultants/Other Providers:    Dylon Herrmann MD, CMD, FACP    [address] [place] [address]  Office Tel: [phone]  Cell: [phone]

## 2022-02-14 NOTE — PROVIDER CONTACT NOTE (OTHER) - SITUATION
Serum creatinine 1.44
Pt received from ED, Nurse Lety Chauhan w/ insulin pump in place, no FS since 2PM documented. Pt DM type1.

## 2022-02-14 NOTE — CHART NOTE - NSCHARTNOTEFT_GEN_A_CORE
FS reviewed. Recommend c/w lantus 54 U HS and admelog 8 U TIDAC plus moderate scale before meals and bedtime. Endocrine team will follow.    Tania Hendricks (pager [phone])  On evenings and weekends, please call [phone] or page endocrine fellow on call.   Please note that this patient may be followed by different provider tomorrow. If no answer, contact endocrine fellow on call. FS reviewed. Recommend c/w lantus 54 U HS and admelog 8 U TIDAC plus low scale before meals and bedtime. Endocrine team will follow.    Tania Hendricks (pager [phone])  On evenings and weekends, please call [phone] or page endocrine fellow on call.   Please note that this patient may be followed by different provider tomorrow. If no answer, contact endocrine fellow on call.

## 2022-02-14 NOTE — PROGRESS NOTE ADULT - PROBLEM SELECTOR PLAN 3
by hx   -Reached out to outpt pulms office (Dr. Boyce [phone]), pts settings are 24/19??. Continue bipap 20/10/40%.  -May need to attempt to get AVAPS approved while in patient (pt on bipap as an outpt, no machines available and pts machine is broken). Will f/u with pt this afternoon if he was able to get in contact with his outpt company who provided his machine. by hx   -Reached out to outpt pulms office (Dr. Boyce [phone]), pts settings are 24/19??. Continue bipap 20/10/40%.  -Per pt, he was able to get in contact with his outpt company who provided his machine and they are able to provide him with a new one over the next 1-2 days.

## 2022-02-14 NOTE — PROGRESS NOTE ADULT - ASSESSMENT
57 y/o M PMhx ESRD s/p LDRT (2013 on maintenance tacro and azathioprine), DM I, HTN, CARMEN, CVA (2015) w/ residual left sided numbness, COVID19 infection 12/2021 who presented w/ SOB x 3-4 days prior to admission and RUQ abd discomfort 2 days later associated with nausea, decreased appetite, chills and loose stools.     Concern for Cholecystitis  Abd US- Mildly distended gallbladder with diffuse wall thickening containing intraluminal air and possible air within the wall  CT-Mildly distended gallbladder with mild wall thickening and intraluminal versus intramural gas, as identified on the prior ultrasound, potentially reflecting emphysematous cystitis  HIDA-negative  s/p surgery consult- not a surgical candidate  low procalcitonin, no leukocytosis, afebrile  pt states RUQ tenderness now resolved and feeling significantly improved since admission  will plan to discontinue zosyn, completed 5 day course of empiric antibiotics and would monitor off antibiotics  PCP prophylaxis per protocol- on Bactrim 1DS tab daily    David Bravo M.D.  [phone]  [facility], Division of Infectious Diseases  [phone]  After 5pm on weekdays and all day on weekends - please call [phone]

## 2022-02-14 NOTE — PROGRESS NOTE ADULT - SUBJECTIVE AND OBJECTIVE BOX
Patient is a 56y old  Male who presents with a chief complaint of SOB, abd pain (14 Feb 2022 13:07)       INTERVAL HPI/OVERNIGHT EVENTS:  Patient seen and evaluated at bedside.  Pt is resting comfortable in NAD.     Allergies    No Known Allergies    Intolerances        Vital Signs Last 24 Hrs  T(C): 36.9 (14 Feb 2022 15:58), Max: 37.1 (13 Feb 2022 20:42)  T(F): 98.5 (14 Feb 2022 15:58), Max: 98.7 (13 Feb 2022 20:42)  HR: 72 (14 Feb 2022 16:34) (57 - 72)  BP: 119/71 (14 Feb 2022 15:58) (119/71 - 146/76)  BP(mean): --  RR: 18 (14 Feb 2022 15:58) (18 - 18)  SpO2: 94% (14 Feb 2022 16:34) (94% - 98%)    LABS:                        13.4   6.49  )-----------( 244      ( 14 Feb 2022 08:53 )             43.4     02-14    141  |  99  |  20  ----------------------------<  132<H>  4.5   |  33<H>  |  1.44<H>    Ca    9.7      14 Feb 2022 08:53  Mg     2.1     02-14          CAPILLARY BLOOD GLUCOSE      POCT Blood Glucose.: 197 mg/dL (14 Feb 2022 17:43)  POCT Blood Glucose.: 131 mg/dL (14 Feb 2022 12:43)  POCT Blood Glucose.: 136 mg/dL (14 Feb 2022 08:17)  POCT Blood Glucose.: 144 mg/dL (13 Feb 2022 21:32)      Lower Extremity Physical Exam:    Vasular: DP/PT weakly palpable, B/L, CFT <3 seconds B/L, Temperature gradient wnl, B/L.   Neuro: Epicritic sensation diminished to the level of foot, B/L.  Musculoskeletal/Ortho: foot deformity noted on right foot with prominence plantar lateral   Skin: right diabetic foot ulcer plantar midfoot with no signs of infection, granular base, 3.5x3.5 x 0.1 cm    RADIOLOGY  < from: Xray Foot AP + Lateral + Oblique, Right (02.12.22 @ 11:20) >  ACC: [id number] EXAM:  XR FOOT COMP MIN 3 VIEWS RT                          PROCEDURE DATE:  02/12/2022          INTERPRETATION:  History: Right foot ulcer.    3 views of the right foot were performed. Correlation is made with prior   radiographs from September 21, 2021.    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 [place] sutures seen along the navicular likely related to prior   posterior tibialis tendinous repair. There is lucency about the fixation   screw which appears more prominent compared to the prior examination and   is concerning for loosening. There is no evidence of acute fracture or   dislocation. There isno evidence of osseous erosion or destruction to   suggest osteolysis. Dorsal spurring is seen at the navicular cuneiform   joints. There is a plantar calcaneal spur.    IMPRESSION:    No radiographic evidence of ostomy myelitis. Findings suggestive of   loosening about the posterior tibialis fixation screw on the navicular   compared to the prior examination.    --- End of Report ---            CATE PATRICK MD; Attending Radiologist  This document has been electronically signed. Feb 12 2022 11:50AM    < end of copied text >

## 2022-02-14 NOTE — PROVIDER CONTACT NOTE (OTHER) - ASSESSMENT
AOx4, vs stable, CR- 1.18 ( 02/11)
Pt a&ox4, no pain or discomfort noted. No diaphoresis, tremors, SOB noted. Pt denying activating insulin pump. As per pt, he was sleeping for most of the night. Pt has been NPO, no fluids in place. Last meal as per pt was prior to coming into the ED.

## 2022-02-14 NOTE — PROGRESS NOTE ADULT - SUBJECTIVE AND OBJECTIVE BOX
Follow-up Pulm Progress Note    comfortable on bipap settings qHS  sats 97% 4LNC, o2 lowered to 3LNC  denies SOB/CP     Medications:  MEDICATIONS  (STANDING):  aspirin  chewable 81 milliGRAM(s) Oral daily  atorvastatin 10 milliGRAM(s) Oral at bedtime  cloNIDine 0.1 milliGRAM(s) Oral two times a day  dextrose 40% Gel 15 Gram(s) Oral once  dextrose 5%. 1000 milliLiter(s) (50 mL/Hr) IV Continuous <Continuous>  dextrose 5%. 1000 milliLiter(s) (100 mL/Hr) IV Continuous <Continuous>  dextrose 50% Injectable 25 Gram(s) IV Push once  dextrose 50% Injectable 12.5 Gram(s) IV Push once  dextrose 50% Injectable 25 Gram(s) IV Push once  enoxaparin Injectable 40 milliGRAM(s) SubCutaneous every 12 hours  famotidine    Tablet 20 milliGRAM(s) Oral daily  gabapentin 300 milliGRAM(s) Oral two times a day  glucagon  Injectable 1 milliGRAM(s) IntraMuscular once  hydrALAZINE 25 milliGRAM(s) Oral three times a day  insulin glargine Injectable (LANTUS) 54 Unit(s) SubCutaneous <User Schedule>  insulin lispro (ADMELOG) corrective regimen sliding scale   SubCutaneous at bedtime  insulin lispro (ADMELOG) corrective regimen sliding scale   SubCutaneous three times a day before meals  insulin lispro Injectable (ADMELOG) 8 Unit(s) SubCutaneous three times a day before meals  lactobacillus acidophilus 1 Tablet(s) Oral three times a day  losartan 25 milliGRAM(s) Oral daily  metoprolol tartrate 75 milliGRAM(s) Oral two times a day  mupirocin 2% Ointment 1 Application(s) Topical <User Schedule>  piperacillin/tazobactam IVPB.. 3.375 Gram(s) IV Intermittent every 8 hours  tacrolimus 1.5 milliGRAM(s) Oral two times a day  trimethoprim   80 mG/sulfamethoxazole 400 mG 1 Tablet(s) Oral daily    MEDICATIONS  (PRN):  acetaminophen     Tablet .. 650 milliGRAM(s) Oral every 6 hours PRN Temp greater or equal to 38C (100.4F), Mild Pain (1 - 3)  melatonin 3 milliGRAM(s) Oral at bedtime PRN Insomnia  ondansetron Injectable 4 milliGRAM(s) IV Push every 8 hours PRN Nausea and/or Vomiting          Vital Signs Last 24 Hrs  T(C): 36.4 (14 Feb 2022 04:44), Max: 37.1 (13 Feb 2022 20:42)  T(F): 97.5 (14 Feb 2022 04:44), Max: 98.7 (13 Feb 2022 20:42)  HR: 64 (14 Feb 2022 09:52) (57 - 75)  BP: 138/84 (14 Feb 2022 04:44) (138/84 - 154/79)  BP(mean): --  RR: 18 (14 Feb 2022 05:34) (17 - 18)  SpO2: 95% (14 Feb 2022 09:52) (95% - 98%)          02-13 @ 07:01  -  02-14 @ 07:00  --------------------------------------------------------  IN: 1080 mL / OUT: 450 mL / NET: 630 mL          LABS:                        13.4   6.49  )-----------( 244      ( 14 Feb 2022 08:53 )             43.4     02-14    141  |  99  |  20  ----------------------------<  132<H>  4.5   |  33<H>  |  1.44<H>    Ca    9.7      14 Feb 2022 08:53  Mg     2.1     02-14            CAPILLARY BLOOD GLUCOSE      POCT Blood Glucose.: 131 mg/dL (14 Feb 2022 12:43)          CULTURES:    Culture - Blood (collected 02-08-22 @ 17:35)  Source: .Blood Blood-Peripheral  Final Report (02-13-22 @ 18:00):    No Growth Final    Culture - Blood (collected 02-08-22 @ 17:35)  Source: .Blood Blood-Peripheral  Final Report (02-13-22 @ 18:00):    No Growth Final        Culture - Urine (collected 02-08-22 @ 21:14)  Source: Clean Catch Clean Catch (Midstream)  Final Report (02-09-22 @ 16:17):    No growth        Physical Examination:  PULM: Clear to auscultation bilaterally, no significant sputum production  CVS: S1, S2 heard      RADIOLOGY REVIEWED  CT chest:    < from: CT Angio Chest PE Protocol w/ IV Cont (02.08.22 @ 18:36) >  FINDINGS:    PULMONARY ANGIOGRAM: No main, central, lobar, segmental or subsegmental   pulmonary embolus.    LYMPH NODES: No mediastinal and/or hilar lymphadenopathy.    HEART/VASCULATURE: The heart size is normal. No pericardial effusion.   Coronary artery calcifications.    AIRWAYS/LUNGS/PLEURA: Central airways are patent. Right lower lobe   partial passive atelectasis. Right middle lobe, lingular and left lower   lobe linear atelectasis. Mild/moderate right pleural effusion. No   pneumothorax.    UPPER ABDOMEN: Partially imaged mildly distended gallbladder containing   hyperdensity, may represents sludge. Foci of nondependent air is also   noted likely within the gallbladder lumen. Trace pericholecystic fat   stranding. The right hemidiaphragm is elevated.    BONES/SOFT TISSUES: Degenerative changes of the bones. Left anterior   chest wall subcutaneous tissue cardiac loop recorder.    Heterogeneity of the partially imaged thyroid gland containing tiny   hypodense nodules and calcifications as on the prior study.    IMPRESSION:    No pulmonary embolus.    Partially imaged distended gallbladder containing sludge with trace   pericholecystic fat stranding. A few foci of nondependent air likely   within the gallbladder lumen. Findings are worrisome for acute   cholecystitis. Recommend further evaluation with dedicated right upper   quadrant sonogram.    Small to moderate right pleural effusion with adjacent right lower lobe   partial compressive atelectasis.      < end of copied text >

## 2022-02-14 NOTE — DISCHARGE NOTE PROVIDER - NSDCCPCAREPLAN_GEN_ALL_CORE_FT
PRINCIPAL DISCHARGE DIAGNOSIS  Diagnosis: Emphysematous cholecystitis  Assessment and Plan of Treatment:       SECONDARY DISCHARGE DIAGNOSES  Diagnosis: Benign essential HTN  Assessment and Plan of Treatment: Low salt diet  Activity as tolerated.  Take all medication as prescribed.  Follow up with your medical doctor for routine blood pressure monitoring at your next visit.  Notify your doctor if you have any of the following symptoms:   Dizziness, Lightheadedness, Blurry vision, Headache, Chest pain, Shortness of breath      Diagnosis: Stage 2 chronic kidney disease  Assessment and Plan of Treatment: Avoid taking (NSAIDs) - (ex: Ibuprofen, Advil, Celebrex, Naprosyn)  Avoid taking any nephrotoxic agents (can harm kidneys) - Intravenous contrast for diagnostic testing, combination cold medications.  Have all medications adjusted for your renal function by your Health Care Provider.  Blood pressure control is important.  Take all medication as prescribed.      Diagnosis: Diabetes  Assessment and Plan of Treatment: HgA1C this admission, 9.4%  Make sure you get your HgA1c checked every three months.  Check your blood glucose before meals and at bedtime.  Continue with insulin pump  It's important not to skip any meals.  Keep a log of your blood glucose results and always take it with you to your doctor appointments.  Keep a list of your current medications including injectables and over the counter medications and bring this medication list with you to all your doctor appointments.  If you have not seen your opthalmologist this year call for appointment.  Check your feet daily for redness, sores, or openings. Do not self treat. If no improvement in two days call your primary care physician for an appointment.  Low blood sugar (hypoglycemia) is a blood sugar below 70mg/dl. Check your blood sugar if you feel signs/symptoms of hypoglycemia. If your blood sugar is below 70 take 15 grams of carbohydrates (ex 4 oz of apple juice, 3-4 glucosr tablets, or 4-6 oz of regular soda) wait 15 minutes and repeat blood sugar to make sure it comes up above 70.  If your blood sugar is above 70 and you are due for a meal, have a meal.  If you are not due for a meal have a snack.  This snack helps keeps your blood sugar at a safe range.      Diagnosis: Acute on chronic respiratory failure with hypoxia  Assessment and Plan of Treatment: Continue with BiPAP at night.  If you become short of breath or have difficulty breathing return to the ED for evaluation  Please follow up with your pulmonologist 1-2 weeks after discharge for continued monitoring and management.       PRINCIPAL DISCHARGE DIAGNOSIS  Diagnosis: Emphysematous cholecystitis  Assessment and Plan of Treatment:       SECONDARY DISCHARGE DIAGNOSES  Diagnosis: Acute on chronic respiratory failure with hypoxia  Assessment and Plan of Treatment: Continue with BiPAP at night.  If you become short of breath or have difficulty breathing return to the ED for evaluation  Please follow up with your pulmonologist 1-2 weeks after discharge for continued monitoring and management.      Diagnosis: Benign essential HTN  Assessment and Plan of Treatment: Low salt diet  Activity as tolerated.  Take all medication as prescribed.  Follow up with your medical doctor for routine blood pressure monitoring at your next visit.  Notify your doctor if you have any of the following symptoms:   Dizziness, Lightheadedness, Blurry vision, Headache, Chest pain, Shortness of breath      Diagnosis: Stage 2 chronic kidney disease  Assessment and Plan of Treatment: Avoid taking (NSAIDs) - (ex: Ibuprofen, Advil, Celebrex, Naprosyn)  Avoid taking any nephrotoxic agents (can harm kidneys) - Intravenous contrast for diagnostic testing, combination cold medications.  Have all medications adjusted for your renal function by your Health Care Provider.  Blood pressure control is important.  Take all medication as prescribed.      Diagnosis: Diabetes  Assessment and Plan of Treatment: Seen and followed by endocrine and disch. plan to include - can resume insulin pump approximately 22 hours after last Lantus dose  He will f/u with Dr Garcia as out-pt or at Endocrine Office [address] [place] [address] [phone]  His pump is not properly setup, but he has been managing on the pump for 3yrs. NO DKA or hospitalization, so, it would be safe to d/c him on it.       HgA1C this admission, 9.4%  Make sure you get your HgA1c checked every three months.  Check your blood glucose before meals and at bedtime.  Continue with insulin pump  It's important not to skip any meals.  Keep a log of your blood glucose results and always take it with you to your doctor appointments.  Keep a list of your current medications including injectables and over the counter medications and bring this medication list with you to all your doctor appointments.  If you have not seen your opthalmologist this year call for appointment.  Check your feet daily for redness, sores, or openings. Do not self treat. If no improvement in two days call your primary care physician for an appointment.  Low blood sugar (hypoglycemia) is a blood sugar below 70mg/dl. Check your blood sugar if you feel signs/symptoms of hypoglycemia. If your blood sugar is below 70 take 15 grams of carbohydrates (ex 4 oz of apple juice, 3-4 glucosr tablets, or 4-6 oz of regular soda) wait 15 minutes and repeat blood sugar to make sure it comes up above 70.  If your blood sugar is above 70 and you are due for a meal, have a meal.  If you are not due for a meal have a snack.  This snack helps keeps your blood sugar at a safe range.

## 2022-02-14 NOTE — CHART NOTE - NSCHARTNOTESELECT_GEN_ALL_CORE
Endocrinology/Event Note
Event Note
Hypoglycemia/Event Note
Event Note
Transplant Nephrology/Event Note
Wound Care Team Note:/Event Note

## 2022-02-14 NOTE — DISCHARGE NOTE PROVIDER - HOSPITAL COURSE
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     Problem/Plan - 1:  ·  Problem: Acute on chronic respiratory failure with hypoxia.   ·  Plan: On BIPAP at night  Pul f/up noted  IV Zosyn - completed course  ID f/up noted.     Problem/Plan - 2:  ·  Problem: Acute cholecystitis.   ·  Plan: HIDA negative/ Ruled out.     Problem/Plan - 3:  ·  Problem: Stage 2 chronic kidney disease.   ·  Plan: -Transplant nephro f/up appreciated  - c/w tacrolimus bid dosing, monitor am tacro level  - Transplant nephro f/up appreciated.     Problem/Plan - 4:  ·  Problem: Diabetes.   ·  Plan: FSSS  Lantus/Premeal.     Problem/Plan - 5:  ·  Problem: Benign essential HTN.   ·  Plan: Cont home meds - losartan, hydralazine, clonidine.     Problem/Plan - 6:  ·  Problem: Prophylactic measure.   ·  Plan: DVT PPX: lovenox   no PT needs. 57 yo M PMH of ESRD s/p LDRT (2013, current baseline Cr 1.2) DM type 1 w/ on insulin pump, HTN, restrictive lung disease and CARMEN (on nocturnal NIPPV), hx of DVT (previously on Eliquis), CVA (2015) with residual left sided numbness, hx of WTC exposure with chronic hypoxic respiratory failure on 2L O2 PRN, COVID19 infection 12/2021 presents w/ hypoxia and abd pain adm with acute on chronic hypoxic respiratory failure and acute cholecystitis.    Acute on chronic respiratory failure with hypoxia. On BIPAP at night  Pul f/up noted / IV Zosyn - completed course / ID f/up noted.    Acute cholecystitis. HIDA negative/ Ruled out.    Stage 2 chronic kidney disease. Transplant nephro f/up appreciated  - c/w tacrolimus bid dosing, monitor am tacro level  - Transplant nephro f/up appreciated.    Diabetes. FSSS - Lantus / Premeal.    Benign essential HTN. Cont home meds - losartan, hydralazine, clonidine.    Prophylactic measure. DVT PPX: lovenox  / no PT needs.    On 2/15 pt is discharged for today  - d/w Dr. Herrmann 55 yo M PMH of ESRD s/p LDRT (2013, current baseline Cr 1.2) DM type 1 w/ on insulin pump, HTN, restrictive lung disease and CARMEN (on nocturnal NIPPV), hx of DVT (previously on Eliquis), CVA (2015) with residual left sided numbness, hx of WTC exposure with chronic hypoxic respiratory failure on 2L O2 PRN, COVID19 infection 12/2021 presents w/ hypoxia and abd pain adm with acute on chronic hypoxic respiratory failure and acute cholecystitis.    Acute on chronic respiratory failure with hypoxia. On BIPAP at night  Pul f/up noted / IV Zosyn - completed course / ID f/up noted.    Acute cholecystitis. HIDA negative/ Ruled out.    Stage 2 chronic kidney disease. Transplant nephro f/up appreciated  - c/w tacrolimus bid dosing, monitor am tacro level  - Transplant nephro f/up appreciated.    Diabetes. FSSS - Lantus / Premeal.    Benign essential HTN. Cont home meds - losartan, hydralazine, clonidine.    Prophylactic measure. DVT PPX: lovenox  / no PT needs.    On 2/15 pt is discharged for today  - d/w Dr. Herrmann  -Reached out to out-pt. pulm office (Dr. Boyce [phone]), pt's settings are 24/19??. Continue Bipap 20/10/40%.  -Per pt, he was able to get in contact with his out-pt company who provided his machine and they are able to provide him with a new one, reportedly to be delivered today.   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    Acute on chronic respiratory failure with hypoxia. On BIPAP at night  Pul f/up noted / IV Zosyn - completed course / ID f/up noted.    Acute cholecystitis. HIDA negative/ Ruled out.    Stage 2 chronic kidney disease. Transplant nephro f/up appreciated  - c/w tacrolimus bid dosing, monitor am tacro level  - Transplant nephro f/up appreciated.    Diabetes. FSSS - Lantus / Premeal.    Benign essential HTN. Cont home meds - losartan, hydralazine, clonidine.    Prophylactic measure. DVT PPX: lovenox  / no PT needs.    On 2/15 pt discharge initially held due to elevated cr.  - d/w Dr. Herrmann & transport nephrology - + fluids, labs sent and u/sent - pt cr. improved and per nephrology hold Losartan for 2 days and then resume.     Hydration encouraged.  Additionally, reached out to out-pt. pulm office (Dr. Boyce [phone]), pt's settings are 24/19??. Continue Bipap 20/10/40%.  Per pt, he was able to get in contact with his out-pt company who provided his machine and they are able to provide him with a new one, reportedly to be delivered today.  All above d/w Dr. Herrmann and pt. dc'd today 2/15.   57 yo M PMH of ESRD s/p LDRT (2013, current baseline Cr 1.2) DM type 1 w/ on insulin pump, HTN, restrictive lung disease and CARMEN (on nocturnal NIPPV), hx of DVT (previously on Eliquis), CVA (2015) with residual left sided numbness, hx of WTC exposure with chronic hypoxic respiratory failure on 2L O2 PRN, COVID19 infection 12/2021 presents w/ hypoxia and abd pain adm with acute on chronic hypoxic respiratory failure and acute cholecystitis.    Acute on chronic respiratory failure with hypoxia. On BIPAP at night  Pul f/up noted / IV Zosyn - completed course / ID f/up noted.    Acute cholecystitis. HIDA negative/ Ruled out.    Stage 2 chronic kidney disease. Transplant nephro f/up appreciated  - c/w tacrolimus bid dosing, monitor am tacro level  - Transplant nephro f/up appreciated.    Diabetes. FSSS - Lantus / Premeal. Seen and followed by endocrine and disch. plan to include - can resume insulin pump approximately 22 hours after last Lantus dose  He will f/u with Dr Garcia as out-pt or at Endocrine Office [address] [place] [address] [phone]  His pump is not properly setup, but he has been managing on the pump for 3yrs. NO DKA or hospitalization, so, it would be safe to d/c him on it.       Benign essential HTN. Cont home meds - losartan, hydralazine, clonidine.    Prophylactic measure. DVT PPX: lovenox  / no PT needs.    On 2/15 pt discharge initially held due to elevated cr.  - d/w Dr. Herrmann & transport nephrology - + fluids, labs sent and u/sent - pt cr. improved and per nephrology hold Losartan for 2 days and then resume.     Hydration encouraged.  Additionally, reached out to out-pt. pulm office (Dr. Boyce [phone]), pt's settings are 24/19??. Continue Bipap 20/10/40%.  Per pt, he was able to get in contact with his out-pt company who provided his machine and they are able to provide him with a new one, reportedly to be delivered today.  All above d/w Dr. Herrmann and pt. dc'd today 2/15.

## 2022-02-14 NOTE — DISCHARGE NOTE PROVIDER - PROVIDER TOKENS
PROVIDER:[TOKEN:[[id number]],FOLLOWUP:[1-3 days]],PROVIDER:[TOKEN:[[id number]],FOLLOWUP:[2 weeks]],PROVIDER:[TOKEN:[[id number]],FOLLOWUP:[1 week]],PROVIDER:[TOKEN:[[id number]],FOLLOWUP:[2 weeks]]

## 2022-02-14 NOTE — PROGRESS NOTE ADULT - ASSESSMENT
55 y/o male pt with right foot ulcer to subQ   - pt seen and evaluated  - ulcer noted with no signs of infection  - xrays negative for osteo  - rec daily dressing changes to right foot with mupirocin during this admission  - z flow boots in bed during this admission  - follow up at [place] [facility] upon discharge  - case discussed with patient's podiatrist Dr. Catherine

## 2022-02-14 NOTE — PROGRESS NOTE ADULT - SUBJECTIVE AND OBJECTIVE BOX
[facility] DIVISION OF KIDNEY DISEASES AND HYPERTENSION -- FOLLOW UP NOTE  --------------------------------------------------------------------------------      24 hour events/subjective: Pateint seen & examined. Vitals/ labs/ medications reviewed. SOB improved.  Yet on 4L NC        PAST HISTORY  --------------------------------------------------------------------------------  No significant changes to PMH, PSH, FHx, SHx, unless otherwise noted    ALLERGIES & MEDICATIONS  --------------------------------------------------------------------------------  Allergies    No Known Allergies    Intolerances      Standing Inpatient Medications  aspirin  chewable 81 milliGRAM(s) Oral daily  atorvastatin 10 milliGRAM(s) Oral at bedtime  cloNIDine 0.1 milliGRAM(s) Oral two times a day  dextrose 40% Gel 15 Gram(s) Oral once  dextrose 5%. 1000 milliLiter(s) IV Continuous <Continuous>  dextrose 5%. 1000 milliLiter(s) IV Continuous <Continuous>  dextrose 50% Injectable 25 Gram(s) IV Push once  dextrose 50% Injectable 12.5 Gram(s) IV Push once  dextrose 50% Injectable 25 Gram(s) IV Push once  enoxaparin Injectable 40 milliGRAM(s) SubCutaneous every 12 hours  famotidine    Tablet 20 milliGRAM(s) Oral daily  gabapentin 300 milliGRAM(s) Oral two times a day  glucagon  Injectable 1 milliGRAM(s) IntraMuscular once  hydrALAZINE 25 milliGRAM(s) Oral three times a day  insulin glargine Injectable (LANTUS) 54 Unit(s) SubCutaneous <User Schedule>  insulin lispro (ADMELOG) corrective regimen sliding scale   SubCutaneous at bedtime  insulin lispro (ADMELOG) corrective regimen sliding scale   SubCutaneous three times a day before meals  insulin lispro Injectable (ADMELOG) 8 Unit(s) SubCutaneous three times a day before meals  lactobacillus acidophilus 1 Tablet(s) Oral three times a day  losartan 25 milliGRAM(s) Oral daily  metoprolol tartrate 75 milliGRAM(s) Oral two times a day  mupirocin 2% Ointment 1 Application(s) Topical <User Schedule>  piperacillin/tazobactam IVPB.. 3.375 Gram(s) IV Intermittent every 8 hours  tacrolimus 1.5 milliGRAM(s) Oral two times a day  trimethoprim   80 mG/sulfamethoxazole 400 mG 1 Tablet(s) Oral daily    PRN Inpatient Medications  acetaminophen     Tablet .. 650 milliGRAM(s) Oral every 6 hours PRN  melatonin 3 milliGRAM(s) Oral at bedtime PRN  ondansetron Injectable 4 milliGRAM(s) IV Push every 8 hours PRN      REVIEW OF SYSTEMS  --------------------------------------------------------------------------------  Gen: No fatigue, fevers/chills, weakness  Head/Eyes/Ears/Mouth: No headache;No sore throat  Respiratory: No dyspnea, cough,   CV: No chest pain, PND, orthopnea  GI: No abdominal pain, diarrhea, constipation, nausea, vomiting  Transplant: No pain  : No increased frequency, dysuria, hematuria, nocturia  MSK: No joint pain/swelling; no back pain; no edema  Neuro: No dizziness/lightheadedness, weakness      All other systems were reviewed and are negative, except as noted.    VITALS/PHYSICAL EXAM  --------------------------------------------------------------------------------  T(C): 36.4 (02-14-22 @ 04:44), Max: 37.1 (02-13-22 @ 20:42)  HR: 64 (02-14-22 @ 09:52) (57 - 75)  BP: 138/84 (02-14-22 @ 04:44) (138/84 - 154/79)  RR: 18 (02-14-22 @ 05:34) (17 - 18)  SpO2: 95% (02-14-22 @ 09:52) (95% - 98%)  Wt(kg): --        02-13-22 @ 07:01  -  02-14-22 @ 07:00  --------------------------------------------------------  IN: 1080 mL / OUT: 450 mL / NET: 630 mL      Physical Exam:  	Gen: NAD, well-appearing, on 4L NC, obese  	HEENT: supple neck  	Pulm: CTA B/L  	CV: RRR, S1S2; no rub  	Back:  no sacral edema  	Abd: +BS, soft, nontender/nondistended          Transplant: No tenderness, swelling  	Ext:  edema b/l  	Neuro: No focal deficits  	Psych: Normal affect and mood  	Skin: Warm, without rashes        LABS/STUDIES  --------------------------------------------------------------------------------              13.4   6.49  >-----------<  244      [02-14-22 @ 08:53]              43.4     141  |  99  |  20  ----------------------------<  132      [02-14-22 @ 08:53]  4.5   |  33  |  1.44        Ca     9.7     [02-14-22 @ 08:53]      Mg     2.1     [02-14-22 @ 08:53]            Creatinine Trend:  SCr 1.44 [02-14 @ 08:53]  SCr 1.18 [02-11 @ 07:07]  SCr 1.18 [02-10 @ 09:15]  SCr 1.19 [02-08 @ 15:06]    Tacrolimus (), Serum: 4.2 ng/mL (02-13 @ 09:56)  Tacrolimus (), Serum: 5.3 ng/mL (02-12 @ 11:28)  Tacrolimus (), Serum: 5.9 ng/mL (02-11 @ 10:25)  Tacrolimus (), Serum: 6.7 ng/mL (02-10 @ 10:25)            Urinalysis - [02-08-22 @ 17:38]      Color Yellow / Appearance Clear / SG 1.023 / pH 6.0      Gluc 100 mg/dL / Ketone Negative  / Bili Negative / Urobili 3 mg/dL       Blood Small / Protein 100 / Leuk Est Negative / Nitrite Negative      RBC 18 / WBC 3 / Hyaline 2 / Gran  / Sq Epi  / Non Sq Epi 1 / Bacteria Negative      HbA1c 7.1      [12-17-19 @ 10:59]

## 2022-02-14 NOTE — PROGRESS NOTE ADULT - PROBLEM SELECTOR PLAN 1
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  -ABG results noted   -Incentive spirometry use encouraged   -O2 lowered to 3LNC, continue to wean O2 as tolerated, keep sats >90%

## 2022-02-14 NOTE — DISCHARGE NOTE PROVIDER - CARE PROVIDER_API CALL
Donte Catherine (DPM)  [facility]  [address] [place] [address]  Phone: [phone]  Fax: [phone]  Follow Up Time: 1-3 days    Calvin Chi)  Internal Medicine; Nephrology  [address] [place] [address]  Phone: [phone]  Fax: [phone]  Follow Up Time: 2 weeks    Pollo Boyce  Pulmonary Diseases  [address] [place] [address] [address]  [place] [address]  Phone: [phone]  Fax: [phone]  Follow Up Time: 1 week    Kellie Kirby)  Internal Medicine; Nephrology  [address] [place] [address]  Phone: [phone]  Fax: [phone]  Follow Up Time: 2 weeks

## 2022-02-14 NOTE — DISCHARGE NOTE PROVIDER - CARE PROVIDERS DIRECT ADDRESSES
,DirectAddress_Unknown,[email or web address]_Unknown,[email or web address]

## 2022-02-14 NOTE — DISCHARGE NOTE PROVIDER - NSDCMRMEDTOKEN_GEN_ALL_CORE_FT
aspirin 81 mg oral tablet: 1 tab(s) orally once a day  azaTHIOprine 50 mg oral tablet: 2 tab(s) orally once a day  Bactrim 400 mg-80 mg oral tablet: 1 tab(s) orally once a day  cloNIDine 0.1 mg oral tablet: 1 tab(s) orally 2 times a day  famotidine 20 mg oral tablet: 1 tab(s) orally once a day  gabapentin 300 mg oral capsule: 1 cap(s) orally 2 times a day  HumaLOG 100 units/mL injectable solution: Inject 75 to 100 units once a day via pump  hydrALAZINE 25 mg oral tablet: 1 tab(s) orally 3 times a day  losartan 25 mg oral tablet: 1 tab(s) orally once a day (in the evening)  lovastatin 40 mg oral tablet: 1 tab(s) orally once a day  Metoprolol Tartrate 25 mg oral tablet: 3 tab(s) orally 2 times a day    *conf. with patient and pharmacy. 75mg dose, BID*  tacrolimus: 1.5 milligram(s) orally 2 times a day    note:Pt has 1mg and 0.5mg   aspirin 81 mg oral tablet: 1 tab(s) orally once a day  azaTHIOprine 50 mg oral tablet: 2 tab(s) orally once a day  Bactrim 400 mg-80 mg oral tablet: 1 tab(s) orally once a day  cloNIDine 0.1 mg oral tablet: 1 tab(s) orally 2 times a day  famotidine 20 mg oral tablet: 1 tab(s) orally once a day  gabapentin 300 mg oral capsule: 1 cap(s) orally 2 times a day  HumaLOG 100 units/mL injectable solution: Inject 75 to 100 units once a day via pump  hydrALAZINE 25 mg oral tablet: 1 tab(s) orally 3 times a day  lactobacillus acidophilus oral capsule: 1 tab(s) orally 3 times a day  losartan 25 mg oral tablet: 1 tab(s) orally once a day (in the evening)  lovastatin 40 mg oral tablet: 1 tab(s) orally once a day  Metoprolol Tartrate 25 mg oral tablet: 3 tab(s) orally 2 times a day    *conf. with patient and pharmacy. 75mg dose, BID*  mupirocin 2% topical ointment: Apply topically to affected area once a day - R foot   tacrolimus: 1.5 milligram(s) orally 2 times a day    note:Pt has 1mg and 0.5mg   aspirin 81 mg oral tablet: 1 tab(s) orally once a day  Bactrim 400 mg-80 mg oral tablet: 1 tab(s) orally once a day  cloNIDine 0.1 mg oral tablet: 1 tab(s) orally 2 times a day  famotidine 20 mg oral tablet: 1 tab(s) orally once a day  gabapentin 300 mg oral capsule: 1 cap(s) orally 2 times a day  HumaLOG 100 units/mL injectable solution: Inject 75 to 100 units once a day via pump  hydrALAZINE 25 mg oral tablet: 1 tab(s) orally 3 times a day  lactobacillus acidophilus oral capsule: 1 tab(s) orally 3 times a day  losartan 25 mg oral tablet: 1 tab(s) orally once a day (in the evening)  lovastatin 40 mg oral tablet: 1 tab(s) orally once a day  Metoprolol Tartrate 25 mg oral tablet: 3 tab(s) orally 2 times a day      mupirocin 2% topical ointment: Apply topically to affected area once a day - R foot   tacrolimus: 1.5 milligram(s) orally 2 times a day    note:Pt has 1mg and 0.5mg   aspirin 81 mg oral tablet: 1 tab(s) orally once a day  Bactrim 400 mg-80 mg oral tablet: 1 tab(s) orally once a day  cloNIDine 0.1 mg oral tablet: 1 tab(s) orally 2 times a day  famotidine 20 mg oral tablet: 1 tab(s) orally once a day  gabapentin 300 mg oral capsule: 1 cap(s) orally 2 times a day  HumaLOG 100 units/mL injectable solution: Inject 75 to 100 units once a day via pump  hydrALAZINE 25 mg oral tablet: 1 tab(s) orally 3 times a day  lactobacillus acidophilus oral capsule: 1 tab(s) orally 3 times a day  lovastatin 40 mg oral tablet: 1 tab(s) orally once a day  Metoprolol Tartrate 25 mg oral tablet: 3 tab(s) orally 2 times a day      mupirocin 2% topical ointment: Apply topically to affected area once a day - R foot   tacrolimus: 1.5 milligram(s) orally 2 times a day    note:Pt has 1mg and 0.5mg   aspirin 81 mg oral tablet: 1 tab(s) orally once a day  Bactrim 400 mg-80 mg oral tablet: 1 tab(s) orally once a day  cloNIDine 0.1 mg oral tablet: 1 tab(s) orally 2 times a day  famotidine 20 mg oral tablet: 1 tab(s) orally once a day  gabapentin 300 mg oral capsule: 1 cap(s) orally 2 times a day  HumaLOG 100 units/mL injectable solution: Can resume insulin pump approximately 22 hours after last Lantus dose  He will f/u with Dr Garcia as out-pt or at Endocrine Office [address] [place] [address] [phone]  His pump is not properly setup, but he has been managing on the pump for 3yrs. NO DKA or hospitalization, so, it would be safe to d/c him on it per endocrine         hydrALAZINE 25 mg oral tablet: 1 tab(s) orally 3 times a day  lactobacillus acidophilus oral capsule: 1 tab(s) orally 3 times a day  lovastatin 40 mg oral tablet: 1 tab(s) orally once a day  Metoprolol Tartrate 25 mg oral tablet: 3 tab(s) orally 2 times a day      mupirocin 2% topical ointment: Apply topically to affected area once a day - R foot   tacrolimus: 1.5 milligram(s) orally 2 times a day    note:Pt has 1mg and 0.5mg

## 2022-02-15 ENCOUNTER — TRANSCRIPTION ENCOUNTER (OUTPATIENT)
Age: 57
End: 2022-02-15

## 2022-02-15 VITALS — HEART RATE: 63 BPM | OXYGEN SATURATION: 96 %

## 2022-02-15 LAB
ANION GAP SERPL CALC-SCNC: 9 MMOL/L — SIGNIFICANT CHANGE UP (ref 5–17)
APPEARANCE UR: CLEAR — SIGNIFICANT CHANGE UP
BACTERIA # UR AUTO: 0 — SIGNIFICANT CHANGE UP
BILIRUB UR-MCNC: NEGATIVE — SIGNIFICANT CHANGE UP
BUN SERPL-MCNC: 21 MG/DL — SIGNIFICANT CHANGE UP (ref 7–23)
CALCIUM SERPL-MCNC: 9.9 MG/DL — SIGNIFICANT CHANGE UP (ref 8.4–10.5)
CHLORIDE SERPL-SCNC: 100 MMOL/L — SIGNIFICANT CHANGE UP (ref 96–108)
CHLORIDE UR-SCNC: 40 MMOL/L — SIGNIFICANT CHANGE UP
CO2 SERPL-SCNC: 32 MMOL/L — HIGH (ref 22–31)
COLOR SPEC: YELLOW — SIGNIFICANT CHANGE UP
CREAT ?TM UR-MCNC: 97 MG/DL — SIGNIFICANT CHANGE UP
CREAT SERPL-MCNC: 1.27 MG/DL — SIGNIFICANT CHANGE UP (ref 0.5–1.3)
DIFF PNL FLD: NEGATIVE — SIGNIFICANT CHANGE UP
EPI CELLS # UR: 0 /HPF — SIGNIFICANT CHANGE UP
GLUCOSE BLDC GLUCOMTR-MCNC: 133 MG/DL — HIGH (ref 70–99)
GLUCOSE BLDC GLUCOMTR-MCNC: 173 MG/DL — HIGH (ref 70–99)
GLUCOSE BLDC GLUCOMTR-MCNC: 182 MG/DL — HIGH (ref 70–99)
GLUCOSE SERPL-MCNC: 122 MG/DL — HIGH (ref 70–99)
GLUCOSE UR QL: NEGATIVE — SIGNIFICANT CHANGE UP
HYALINE CASTS # UR AUTO: 0 /LPF — SIGNIFICANT CHANGE UP (ref 0–2)
KETONES UR-MCNC: NEGATIVE — SIGNIFICANT CHANGE UP
LEUKOCYTE ESTERASE UR-ACNC: NEGATIVE — SIGNIFICANT CHANGE UP
NITRITE UR-MCNC: NEGATIVE — SIGNIFICANT CHANGE UP
PH UR: 6.5 — SIGNIFICANT CHANGE UP (ref 5–8)
POTASSIUM SERPL-MCNC: 4.4 MMOL/L — SIGNIFICANT CHANGE UP (ref 3.5–5.3)
POTASSIUM SERPL-SCNC: 4.4 MMOL/L — SIGNIFICANT CHANGE UP (ref 3.5–5.3)
POTASSIUM UR-SCNC: 39 MMOL/L — SIGNIFICANT CHANGE UP
PROT ?TM UR-MCNC: 12 MG/DL — SIGNIFICANT CHANGE UP (ref 0–12)
PROT UR-MCNC: SIGNIFICANT CHANGE UP
PROT/CREAT UR-RTO: 0.1 RATIO — SIGNIFICANT CHANGE UP (ref 0–0.2)
RBC CASTS # UR COMP ASSIST: 4 /HPF — SIGNIFICANT CHANGE UP (ref 0–4)
SARS-COV-2 RNA SPEC QL NAA+PROBE: SIGNIFICANT CHANGE UP
SODIUM SERPL-SCNC: 141 MMOL/L — SIGNIFICANT CHANGE UP (ref 135–145)
SODIUM UR-SCNC: 54 MMOL/L — SIGNIFICANT CHANGE UP
SP GR SPEC: 1.02 — SIGNIFICANT CHANGE UP (ref 1.01–1.02)
TACROLIMUS SERPL-MCNC: 4.5 NG/ML — SIGNIFICANT CHANGE UP
UROBILINOGEN FLD QL: ABNORMAL
WBC UR QL: 1 /HPF — SIGNIFICANT CHANGE UP (ref 0–5)

## 2022-02-15 PROCEDURE — 83735 ASSAY OF MAGNESIUM: CPT

## 2022-02-15 PROCEDURE — 85610 PROTHROMBIN TIME: CPT

## 2022-02-15 PROCEDURE — 82435 ASSAY OF BLOOD CHLORIDE: CPT

## 2022-02-15 PROCEDURE — 86901 BLOOD TYPING SEROLOGIC RH(D): CPT

## 2022-02-15 PROCEDURE — 85014 HEMATOCRIT: CPT

## 2022-02-15 PROCEDURE — 76705 ECHO EXAM OF ABDOMEN: CPT

## 2022-02-15 PROCEDURE — 85027 COMPLETE CBC AUTOMATED: CPT

## 2022-02-15 PROCEDURE — 71275 CT ANGIOGRAPHY CHEST: CPT | Mod: MA

## 2022-02-15 PROCEDURE — 83605 ASSAY OF LACTIC ACID: CPT

## 2022-02-15 PROCEDURE — 97161 PT EVAL LOW COMPLEX 20 MIN: CPT

## 2022-02-15 PROCEDURE — 86900 BLOOD TYPING SEROLOGIC ABO: CPT

## 2022-02-15 PROCEDURE — 82803 BLOOD GASES ANY COMBINATION: CPT

## 2022-02-15 PROCEDURE — 74176 CT ABD & PELVIS W/O CONTRAST: CPT

## 2022-02-15 PROCEDURE — 84300 ASSAY OF URINE SODIUM: CPT

## 2022-02-15 PROCEDURE — 81001 URINALYSIS AUTO W/SCOPE: CPT

## 2022-02-15 PROCEDURE — 85018 HEMOGLOBIN: CPT

## 2022-02-15 PROCEDURE — 78227 HEPATOBIL SYST IMAGE W/DRUG: CPT

## 2022-02-15 PROCEDURE — 84132 ASSAY OF SERUM POTASSIUM: CPT

## 2022-02-15 PROCEDURE — 99232 SBSQ HOSP IP/OBS MODERATE 35: CPT

## 2022-02-15 PROCEDURE — 82570 ASSAY OF URINE CREATININE: CPT

## 2022-02-15 PROCEDURE — 80048 BASIC METABOLIC PNL TOTAL CA: CPT

## 2022-02-15 PROCEDURE — 94660 CPAP INITIATION&MGMT: CPT

## 2022-02-15 PROCEDURE — 76776 US EXAM K TRANSPL W/DOPPLER: CPT

## 2022-02-15 PROCEDURE — 85025 COMPLETE CBC W/AUTO DIFF WBC: CPT

## 2022-02-15 PROCEDURE — 87449 NOS EACH ORGANISM AG IA: CPT

## 2022-02-15 PROCEDURE — 0225U NFCT DS DNA&RNA 21 SARSCOV2: CPT

## 2022-02-15 PROCEDURE — 82947 ASSAY GLUCOSE BLOOD QUANT: CPT

## 2022-02-15 PROCEDURE — 71045 X-RAY EXAM CHEST 1 VIEW: CPT

## 2022-02-15 PROCEDURE — 93005 ELECTROCARDIOGRAM TRACING: CPT

## 2022-02-15 PROCEDURE — 84156 ASSAY OF PROTEIN URINE: CPT

## 2022-02-15 PROCEDURE — U0005: CPT

## 2022-02-15 PROCEDURE — 96374 THER/PROPH/DIAG INJ IV PUSH: CPT | Mod: XU

## 2022-02-15 PROCEDURE — 84145 PROCALCITONIN (PCT): CPT

## 2022-02-15 PROCEDURE — 99285 EMERGENCY DEPT VISIT HI MDM: CPT | Mod: 25

## 2022-02-15 PROCEDURE — 84295 ASSAY OF SERUM SODIUM: CPT

## 2022-02-15 PROCEDURE — 73630 X-RAY EXAM OF FOOT: CPT

## 2022-02-15 PROCEDURE — 87040 BLOOD CULTURE FOR BACTERIA: CPT

## 2022-02-15 PROCEDURE — 36415 COLL VENOUS BLD VENIPUNCTURE: CPT

## 2022-02-15 PROCEDURE — U0003: CPT

## 2022-02-15 PROCEDURE — 82330 ASSAY OF CALCIUM: CPT

## 2022-02-15 PROCEDURE — 83036 HEMOGLOBIN GLYCOSYLATED A1C: CPT

## 2022-02-15 PROCEDURE — 80197 ASSAY OF TACROLIMUS: CPT

## 2022-02-15 PROCEDURE — A9537: CPT

## 2022-02-15 PROCEDURE — 80053 COMPREHEN METABOLIC PANEL: CPT

## 2022-02-15 PROCEDURE — 99232 SBSQ HOSP IP/OBS MODERATE 35: CPT | Mod: GC

## 2022-02-15 PROCEDURE — 82436 ASSAY OF URINE CHLORIDE: CPT

## 2022-02-15 PROCEDURE — 85730 THROMBOPLASTIN TIME PARTIAL: CPT

## 2022-02-15 PROCEDURE — 82962 GLUCOSE BLOOD TEST: CPT

## 2022-02-15 PROCEDURE — 87086 URINE CULTURE/COLONY COUNT: CPT

## 2022-02-15 PROCEDURE — 86850 RBC ANTIBODY SCREEN: CPT

## 2022-02-15 PROCEDURE — 84133 ASSAY OF URINE POTASSIUM: CPT

## 2022-02-15 RX ORDER — METOPROLOL TARTRATE 50 MG
3 TABLET ORAL
Qty: 0 | Refills: 0 | DISCHARGE

## 2022-02-15 RX ORDER — LACTOBACILLUS ACIDOPHILUS 100MM CELL
1 CAPSULE ORAL
Qty: 0 | Refills: 0 | DISCHARGE
Start: 2022-02-15

## 2022-02-15 RX ORDER — AZATHIOPRINE 100 MG/1
2 TABLET ORAL
Qty: 0 | Refills: 0 | DISCHARGE

## 2022-02-15 RX ORDER — LOSARTAN POTASSIUM 100 MG/1
1 TABLET, FILM COATED ORAL
Qty: 0 | Refills: 0 | DISCHARGE

## 2022-02-15 RX ORDER — INSULIN LISPRO 100/ML
0 VIAL (ML) SUBCUTANEOUS
Qty: 0 | Refills: 0 | DISCHARGE

## 2022-02-15 RX ORDER — MUPIROCIN 20 MG/G
1 OINTMENT TOPICAL
Qty: 0 | Refills: 0 | DISCHARGE
Start: 2022-02-15

## 2022-02-15 RX ORDER — SODIUM CHLORIDE 9 MG/ML
1000 INJECTION INTRAMUSCULAR; INTRAVENOUS; SUBCUTANEOUS
Refills: 0 | Status: DISCONTINUED | OUTPATIENT
Start: 2022-02-15 | End: 2022-02-15

## 2022-02-15 RX ADMIN — Medication 1 TABLET(S): at 05:28

## 2022-02-15 RX ADMIN — Medication 81 MILLIGRAM(S): at 12:39

## 2022-02-15 RX ADMIN — Medication 25 MILLIGRAM(S): at 05:29

## 2022-02-15 RX ADMIN — FAMOTIDINE 20 MILLIGRAM(S): 10 INJECTION INTRAVENOUS at 12:39

## 2022-02-15 RX ADMIN — Medication 1 TABLET(S): at 13:46

## 2022-02-15 RX ADMIN — TACROLIMUS 1.5 MILLIGRAM(S): 5 CAPSULE ORAL at 09:00

## 2022-02-15 RX ADMIN — MUPIROCIN 1 APPLICATION(S): 20 OINTMENT TOPICAL at 08:56

## 2022-02-15 RX ADMIN — ENOXAPARIN SODIUM 40 MILLIGRAM(S): 100 INJECTION SUBCUTANEOUS at 05:29

## 2022-02-15 RX ADMIN — SODIUM CHLORIDE 50 MILLILITER(S): 9 INJECTION INTRAMUSCULAR; INTRAVENOUS; SUBCUTANEOUS at 13:50

## 2022-02-15 RX ADMIN — Medication 1 TABLET(S): at 12:40

## 2022-02-15 RX ADMIN — Medication 0.1 MILLIGRAM(S): at 05:29

## 2022-02-15 RX ADMIN — Medication 8 UNIT(S): at 08:57

## 2022-02-15 RX ADMIN — Medication 75 MILLIGRAM(S): at 05:28

## 2022-02-15 RX ADMIN — Medication 25 MILLIGRAM(S): at 13:46

## 2022-02-15 RX ADMIN — Medication 1: at 08:57

## 2022-02-15 RX ADMIN — Medication 8 UNIT(S): at 12:40

## 2022-02-15 RX ADMIN — GABAPENTIN 300 MILLIGRAM(S): 400 CAPSULE ORAL at 05:28

## 2022-02-15 RX ADMIN — LOSARTAN POTASSIUM 25 MILLIGRAM(S): 100 TABLET, FILM COATED ORAL at 05:28

## 2022-02-15 RX ADMIN — INSULIN GLARGINE 54 UNIT(S): 100 INJECTION, SOLUTION SUBCUTANEOUS at 17:23

## 2022-02-15 NOTE — PROGRESS NOTE ADULT - PROBLEM SELECTOR PROBLEM 3
CARMEN treated with BiPAP
Diabetes
Stage 2 chronic kidney disease
Diabetes
Stage 2 chronic kidney disease
Stage 2 chronic kidney disease
CARMEN treated with BiPAP
Diabetes
Stage 2 chronic kidney disease
CARMEN treated with BiPAP
Stage 2 chronic kidney disease

## 2022-02-15 NOTE — DISCHARGE NOTE NURSING/CASE MANAGEMENT/SOCIAL WORK - PATIENT PORTAL LINK FT
You can access the FollowMyHealth Patient Portal offered by [facility] by registering at the following website: [email or web address]. By joining [employer]’s FollowMyHealth portal, you will also be able to view your health information using other applications (apps) compatible with our system.

## 2022-02-15 NOTE — DISCHARGE NOTE NURSING/CASE MANAGEMENT/SOCIAL WORK - NSDCCRNUMBER_GEN_ALL_CORE_FT
[facility] at [facility]  Internal Medicine, Family Medicine • 3 Providers    [address], [place], [address]    Make an Appointment    [phone]

## 2022-02-15 NOTE — PROGRESS NOTE ADULT - PROBLEM SELECTOR PROBLEM 5
R/O Acute cholecystitis
Benign essential HTN
Prophylactic measure
Benign essential HTN
Benign essential HTN
R/O Acute cholecystitis
Prophylactic measure
Benign essential HTN
R/O Acute cholecystitis
Benign essential HTN

## 2022-02-15 NOTE — PROGRESS NOTE ADULT - PROBLEM SELECTOR PROBLEM 4
Renal transplant recipient
Renal transplant recipient
Diabetes
Benign essential HTN
Diabetes
Renal transplant recipient
Benign essential HTN
Diabetes

## 2022-02-15 NOTE — PROGRESS NOTE ADULT - ASSESSMENT
55 y/o M PMhx ESRD s/p LDRT (2013 on maintenance tacro and azathioprine), DM I, HTN, CARMEN, CVA (2015) w/ residual left sided numbness, COVID19 infection 12/2021 who presented w/ SOB x 3-4 days prior to admission and RUQ abd discomfort 2 days later associated with nausea, decreased appetite, chills and loose stools.     Concern for Cholecystitis  Abd US- Mildly distended gallbladder with diffuse wall thickening containing intraluminal air and possible air within the wall  CT-Mildly distended gallbladder with mild wall thickening and intraluminal versus intramural gas, as identified on the prior ultrasound, potentially reflecting emphysematous cystitis  HIDA-negative  effusion possibly reactive 2/2 gallbladder inflammation , however, HIDA neg  s/p surgery consult- not a surgical candidate  low procalcitonin, no leukocytosis, afebrile  pt states RUQ tenderness now resolved and feeling significantly improved since admission  completed 5 day course of empiric antibiotics & he continues to feel well off of antibiotics  off O2 this AM  PCP prophylaxis per protocol- on Bactrim 1DS tab daily  discharge planning    David Bravo M.D.  [phone]  [facility], Division of Infectious Diseases  [phone]  After 5pm on weekdays and all day on weekends - please call [phone]

## 2022-02-15 NOTE — PROGRESS NOTE ADULT - SUBJECTIVE AND OBJECTIVE BOX
Chief Complaint:     History:    MEDICATIONS  (STANDING):  aspirin  chewable 81 milliGRAM(s) Oral daily  atorvastatin 10 milliGRAM(s) Oral at bedtime  cloNIDine 0.1 milliGRAM(s) Oral two times a day  dextrose 40% Gel 15 Gram(s) Oral once  dextrose 5%. 1000 milliLiter(s) (50 mL/Hr) IV Continuous <Continuous>  dextrose 5%. 1000 milliLiter(s) (100 mL/Hr) IV Continuous <Continuous>  dextrose 50% Injectable 25 Gram(s) IV Push once  dextrose 50% Injectable 12.5 Gram(s) IV Push once  dextrose 50% Injectable 25 Gram(s) IV Push once  enoxaparin Injectable 40 milliGRAM(s) SubCutaneous every 12 hours  famotidine    Tablet 20 milliGRAM(s) Oral daily  gabapentin 300 milliGRAM(s) Oral two times a day  glucagon  Injectable 1 milliGRAM(s) IntraMuscular once  hydrALAZINE 25 milliGRAM(s) Oral three times a day  insulin glargine Injectable (LANTUS) 54 Unit(s) SubCutaneous <User Schedule>  insulin lispro (ADMELOG) corrective regimen sliding scale   SubCutaneous three times a day before meals  insulin lispro (ADMELOG) corrective regimen sliding scale   SubCutaneous at bedtime  insulin lispro Injectable (ADMELOG) 8 Unit(s) SubCutaneous three times a day before meals  lactobacillus acidophilus 1 Tablet(s) Oral three times a day  losartan 25 milliGRAM(s) Oral daily  metoprolol tartrate 75 milliGRAM(s) Oral two times a day  mupirocin 2% Ointment 1 Application(s) Topical <User Schedule>  tacrolimus 1.5 milliGRAM(s) Oral two times a day  trimethoprim   80 mG/sulfamethoxazole 400 mG 1 Tablet(s) Oral daily    MEDICATIONS  (PRN):  acetaminophen     Tablet .. 650 milliGRAM(s) Oral every 6 hours PRN Temp greater or equal to 38C (100.4F), Mild Pain (1 - 3)  melatonin 3 milliGRAM(s) Oral at bedtime PRN Insomnia  ondansetron Injectable 4 milliGRAM(s) IV Push every 8 hours PRN Nausea and/or Vomiting      Allergies    No Known Allergies    Intolerances      Review of Systems:  Constitutional: No fever  Eyes: No blurry vision  Neuro: No tremors  HEENT: No pain  Cardiovascular: No chest pain, palpitations  Respiratory: No SOB, no cough  GI: No nausea, vomiting, abdominal pain  : No dysuria  Skin: no rash  Psych: no depression  Endocrine: no polyuria, polydipsia  Hem/lymph: no swelling  Osteoporosis: no fractures    ALL OTHER SYSTEMS REVIEWED AND NEGATIVE    UNABLE TO OBTAIN    PHYSICAL EXAM:  VITALS: T(C): 36.5 (02-15-22 @ 04:33)  T(F): 97.7 (02-15-22 @ 04:33), Max: 98.5 (02-14-22 @ 15:58)  HR: 67 (02-15-22 @ 10:29) (62 - 72)  BP: 144/66 (02-15-22 @ 04:33) (119/71 - 144/66)  RR:  (18 - 18)  SpO2:  (93% - 97%)  Wt(kg): --  GENERAL: NAD, well-groomed, well-developed  EYES: No proptosis, no lid lag, anicteric  HEENT:  Atraumatic, Normocephalic, moist mucous membranes  THYROID: Normal size, no palpable nodules  RESPIRATORY: Clear to auscultation bilaterally; No rales, rhonchi, wheezing, or rubs  CARDIOVASCULAR: Regular rate and rhythm; No murmurs; no peripheral edema  GI: Soft, nontender, non distended, normal bowel sounds  SKIN: Dry, intact, No rashes or lesions  MUSCULOSKELETAL: Full range of motion, normal strength  NEURO: sensation intact, extraocular movements intact, no tremor, normal reflexes  PSYCH: Alert and oriented x 3, normal affect, normal mood  CUSHING'S SIGNS: no striae    POCT Blood Glucose.: 182 mg/dL (02-15-22 @ 08:22)  POCT Blood Glucose.: 243 mg/dL (02-14-22 @ 21:14)  POCT Blood Glucose.: 197 mg/dL (02-14-22 @ 17:43)  POCT Blood Glucose.: 131 mg/dL (02-14-22 @ 12:43)  POCT Blood Glucose.: 136 mg/dL (02-14-22 @ 08:17)  POCT Blood Glucose.: 144 mg/dL (02-13-22 @ 21:32)  POCT Blood Glucose.: 137 mg/dL (02-13-22 @ 17:31)  POCT Blood Glucose.: 153 mg/dL (02-13-22 @ 12:59)  POCT Blood Glucose.: 108 mg/dL (02-13-22 @ 08:44)  POCT Blood Glucose.: 155 mg/dL (02-12-22 @ 21:31)  POCT Blood Glucose.: 142 mg/dL (02-12-22 @ 17:27)  POCT Blood Glucose.: 142 mg/dL (02-12-22 @ 12:54)      02-14    141  |  99  |  20  ----------------------------<  132<H>  4.5   |  33<H>  |  1.44<H>    EGFR if : 63  EGFR if non : 54<L>    Ca    9.7      02-14  Mg     2.1     02-14            Thyroid Function Tests:                           Chief Complaint: T1DM    History: Patient had some extra bread with dinner last night. Possible discharge home today.     MEDICATIONS  (STANDING):  aspirin  chewable 81 milliGRAM(s) Oral daily  atorvastatin 10 milliGRAM(s) Oral at bedtime  cloNIDine 0.1 milliGRAM(s) Oral two times a day  dextrose 40% Gel 15 Gram(s) Oral once  dextrose 5%. 1000 milliLiter(s) (50 mL/Hr) IV Continuous <Continuous>  dextrose 5%. 1000 milliLiter(s) (100 mL/Hr) IV Continuous <Continuous>  dextrose 50% Injectable 25 Gram(s) IV Push once  dextrose 50% Injectable 12.5 Gram(s) IV Push once  dextrose 50% Injectable 25 Gram(s) IV Push once  enoxaparin Injectable 40 milliGRAM(s) SubCutaneous every 12 hours  famotidine    Tablet 20 milliGRAM(s) Oral daily  gabapentin 300 milliGRAM(s) Oral two times a day  glucagon  Injectable 1 milliGRAM(s) IntraMuscular once  hydrALAZINE 25 milliGRAM(s) Oral three times a day  insulin glargine Injectable (LANTUS) 54 Unit(s) SubCutaneous <User Schedule>  insulin lispro (ADMELOG) corrective regimen sliding scale   SubCutaneous three times a day before meals  insulin lispro (ADMELOG) corrective regimen sliding scale   SubCutaneous at bedtime  insulin lispro Injectable (ADMELOG) 8 Unit(s) SubCutaneous three times a day before meals  lactobacillus acidophilus 1 Tablet(s) Oral three times a day  losartan 25 milliGRAM(s) Oral daily  metoprolol tartrate 75 milliGRAM(s) Oral two times a day  mupirocin 2% Ointment 1 Application(s) Topical <User Schedule>  tacrolimus 1.5 milliGRAM(s) Oral two times a day  trimethoprim   80 mG/sulfamethoxazole 400 mG 1 Tablet(s) Oral daily    MEDICATIONS  (PRN):  acetaminophen     Tablet .. 650 milliGRAM(s) Oral every 6 hours PRN Temp greater or equal to 38C (100.4F), Mild Pain (1 - 3)  melatonin 3 milliGRAM(s) Oral at bedtime PRN Insomnia  ondansetron Injectable 4 milliGRAM(s) IV Push every 8 hours PRN Nausea and/or Vomiting      Allergies    No Known Allergies    Intolerances      Review of Systems:  Constitutional: No fever  Eyes: No blurry vision  Neuro: No tremors  HEENT: No pain  Cardiovascular: No chest pain, palpitations  Respiratory: No SOB, no cough  GI: No nausea, vomiting, abdominal pain  : No dysuria  Skin: no rash  Psych: no depression  Endocrine: no polyuria, polydipsia      ALL OTHER SYSTEMS REVIEWED AND NEGATIVE      PHYSICAL EXAM:  VITALS: T(C): 36.5 (02-15-22 @ 04:33)  T(F): 97.7 (02-15-22 @ 04:33), Max: 98.5 (02-14-22 @ 15:58)  HR: 67 (02-15-22 @ 10:29) (62 - 72)  BP: 144/66 (02-15-22 @ 04:33) (119/71 - 144/66)  RR:  (18 - 18)  SpO2:  (93% - 97%)  Wt(kg): --  GENERAL: NAD, well-groomed, well-developed  EYES: No proptosis, no lid lag, anicteric  HEENT:  Atraumatic, Normocephalic, moist mucous membranes  RESPIRATORY: Clear to auscultation bilaterally  CARDIOVASCULAR: Regular rate and rhythm  GI: Soft, nontender, non distended, normal bowel sounds  SKIN: Dry, intact, No rashes or lesions  MUSCULOSKELETAL: Full range of motion, normal strength  NEURO: sensation intact, extraocular movements intact, no tremor, normal reflexes  PSYCH: Alert and oriented x 3, normal affect, normal mood      POCT Blood Glucose.: 182 mg/dL (02-15-22 @ 08:22)  POCT Blood Glucose.: 243 mg/dL (02-14-22 @ 21:14)  POCT Blood Glucose.: 197 mg/dL (02-14-22 @ 17:43)  POCT Blood Glucose.: 131 mg/dL (02-14-22 @ 12:43)  POCT Blood Glucose.: 136 mg/dL (02-14-22 @ 08:17)  POCT Blood Glucose.: 144 mg/dL (02-13-22 @ 21:32)  POCT Blood Glucose.: 137 mg/dL (02-13-22 @ 17:31)  POCT Blood Glucose.: 153 mg/dL (02-13-22 @ 12:59)  POCT Blood Glucose.: 108 mg/dL (02-13-22 @ 08:44)  POCT Blood Glucose.: 155 mg/dL (02-12-22 @ 21:31)  POCT Blood Glucose.: 142 mg/dL (02-12-22 @ 17:27)  POCT Blood Glucose.: 142 mg/dL (02-12-22 @ 12:54)      02-14    141  |  99  |  20  ----------------------------<  132<H>  4.5   |  33<H>  |  1.44<H>    EGFR if : 63  EGFR if non : 54<L>    Ca    9.7      02-14  Mg     2.1     02-14

## 2022-02-15 NOTE — PROGRESS NOTE ADULT - PROBLEM SELECTOR PLAN 3
Education:  Pt's parent educated on proper dosing of tylenol and motrin. Pt's parent verbalized understanding of proper dosing of tylenol and motrin. by hx   -Reached out to outpt pulms office (Dr. Boyce [phone]), pts settings are 24/19??. Continue bipap 20/10/40%.  -Per pt, he was able to get in contact with his outpt company who provided his machine and they are able to provide him with a new one, reportedly to be delivered today.

## 2022-02-15 NOTE — PROGRESS NOTE ADULT - SUBJECTIVE AND OBJECTIVE BOX
Patient is a 56y old  Male who presents with a chief complaint of SOB, abd pain (14 Feb 2022 18:26)      SUBJECTIVE / OVERNIGHT EVENTS:    Events noted.  CONSTITUTIONAL: No fever,  or fatigue  RESPIRATORY: On supp O2  CARDIOVASCULAR: No chest pain, palpitations, dizziness, or leg swelling  GASTROINTESTINAL: No abdominal or epigastric pain. No nausea, vomiting.  NEUROLOGICAL: No headache    MEDICATIONS  (STANDING):  aspirin  chewable 81 milliGRAM(s) Oral daily  atorvastatin 10 milliGRAM(s) Oral at bedtime  cloNIDine 0.1 milliGRAM(s) Oral two times a day  dextrose 40% Gel 15 Gram(s) Oral once  dextrose 5%. 1000 milliLiter(s) (50 mL/Hr) IV Continuous <Continuous>  dextrose 5%. 1000 milliLiter(s) (100 mL/Hr) IV Continuous <Continuous>  dextrose 50% Injectable 25 Gram(s) IV Push once  dextrose 50% Injectable 12.5 Gram(s) IV Push once  dextrose 50% Injectable 25 Gram(s) IV Push once  enoxaparin Injectable 40 milliGRAM(s) SubCutaneous every 12 hours  famotidine    Tablet 20 milliGRAM(s) Oral daily  gabapentin 300 milliGRAM(s) Oral two times a day  glucagon  Injectable 1 milliGRAM(s) IntraMuscular once  hydrALAZINE 25 milliGRAM(s) Oral three times a day  insulin glargine Injectable (LANTUS) 54 Unit(s) SubCutaneous <User Schedule>  insulin lispro (ADMELOG) corrective regimen sliding scale   SubCutaneous three times a day before meals  insulin lispro (ADMELOG) corrective regimen sliding scale   SubCutaneous at bedtime  insulin lispro Injectable (ADMELOG) 8 Unit(s) SubCutaneous three times a day before meals  lactobacillus acidophilus 1 Tablet(s) Oral three times a day  losartan 25 milliGRAM(s) Oral daily  metoprolol tartrate 75 milliGRAM(s) Oral two times a day  mupirocin 2% Ointment 1 Application(s) Topical <User Schedule>  tacrolimus 1.5 milliGRAM(s) Oral two times a day  trimethoprim   80 mG/sulfamethoxazole 400 mG 1 Tablet(s) Oral daily    MEDICATIONS  (PRN):  acetaminophen     Tablet .. 650 milliGRAM(s) Oral every 6 hours PRN Temp greater or equal to 38C (100.4F), Mild Pain (1 - 3)  melatonin 3 milliGRAM(s) Oral at bedtime PRN Insomnia  ondansetron Injectable 4 milliGRAM(s) IV Push every 8 hours PRN Nausea and/or Vomiting        CAPILLARY BLOOD GLUCOSE      POCT Blood Glucose.: 243 mg/dL (14 Feb 2022 21:14)  POCT Blood Glucose.: 197 mg/dL (14 Feb 2022 17:43)  POCT Blood Glucose.: 131 mg/dL (14 Feb 2022 12:43)  POCT Blood Glucose.: 136 mg/dL (14 Feb 2022 08:17)    I&O's Summary    13 Feb 2022 07:01  -  14 Feb 2022 07:00  --------------------------------------------------------  IN: 1080 mL / OUT: 450 mL / NET: 630 mL    14 Feb 2022 07:01  -  14 Feb 2022 21:52  --------------------------------------------------------  IN: 600 mL / OUT: 0 mL / NET: 600 mL        T(C): 36.7 (02-14-22 @ 19:59), Max: 36.9 (02-14-22 @ 15:58)  HR: 62 (02-14-22 @ 19:59) (57 - 72)  BP: 134/72 (02-14-22 @ 19:59) (119/71 - 138/84)  RR: 18 (02-14-22 @ 19:59) (18 - 18)  SpO2: 93% (02-14-22 @ 19:59) (93% - 98%)    PHYSICAL EXAM:    NECK: Supple, No JVD  CHEST/LUNG: Clear to auscultation bilaterally; No wheezing.  HEART: Regular rate and rhythm; No murmurs, rubs, or gallops  ABDOMEN: Soft, Nontender, Nondistended; Bowel sounds present  EXTREMITIES:   No edema  NEUROLOGY: AAO X 3      LABS:                        13.4   6.49  )-----------( 244      ( 14 Feb 2022 08:53 )             43.4     02-14    141  |  99  |  20  ----------------------------<  132<H>  4.5   |  33<H>  |  1.44<H>    Ca    9.7      14 Feb 2022 08:53  Mg     2.1     02-14              CAPILLARY BLOOD GLUCOSE      POCT Blood Glucose.: 243 mg/dL (14 Feb 2022 21:14)  POCT Blood Glucose.: 197 mg/dL (14 Feb 2022 17:43)  POCT Blood Glucose.: 131 mg/dL (14 Feb 2022 12:43)  POCT Blood Glucose.: 136 mg/dL (14 Feb 2022 08:17)        RADIOLOGY & ADDITIONAL TESTS:    Imaging Personally Reviewed:    Consultant(s) Notes Reviewed:      Care Discussed with Consultants/Other Providers:    Dylon Herrmann MD, CMD, FACP    [id number] [place] [address]  Office Tel: [phone]  Cell: [phone]

## 2022-02-15 NOTE — PROGRESS NOTE ADULT - ASSESSMENT
57 y/o M PMH of CARMEN on bipap qHS with 2LNC, reported restrictive lung disease (post 911), ESRD s/p LDRT (2013, current baseline Cr 1.2) DM type 1 w/ on insulin pump, HTN, DVT (previously on Eliquis), CVA (2015) with residual left sided numbness. COVID 12/2021. Presents with 3-4 days of hypoxia, later followed by RUQ abd discomfort on palpation. CTA chest neg PE - found to have small R effusion and atelectasis. CT A/P and RUQ ultrasound concerning for acute cholecystitis - HIDA scan negative. He remains hypoxia on RA with improvement in hypoxia with deep breathing. Hypoxia now resolved.

## 2022-02-15 NOTE — PROGRESS NOTE ADULT - SUBJECTIVE AND OBJECTIVE BOX
Follow-up Pulm Progress Note    Has been on RA since yesterday evening  Sats currently 96% RA  denies SOB/CP     Medications:  MEDICATIONS  (STANDING):  aspirin  chewable 81 milliGRAM(s) Oral daily  atorvastatin 10 milliGRAM(s) Oral at bedtime  cloNIDine 0.1 milliGRAM(s) Oral two times a day  dextrose 40% Gel 15 Gram(s) Oral once  dextrose 5%. 1000 milliLiter(s) (50 mL/Hr) IV Continuous <Continuous>  dextrose 5%. 1000 milliLiter(s) (100 mL/Hr) IV Continuous <Continuous>  dextrose 50% Injectable 25 Gram(s) IV Push once  dextrose 50% Injectable 12.5 Gram(s) IV Push once  dextrose 50% Injectable 25 Gram(s) IV Push once  enoxaparin Injectable 40 milliGRAM(s) SubCutaneous every 12 hours  famotidine    Tablet 20 milliGRAM(s) Oral daily  gabapentin 300 milliGRAM(s) Oral two times a day  glucagon  Injectable 1 milliGRAM(s) IntraMuscular once  hydrALAZINE 25 milliGRAM(s) Oral three times a day  insulin glargine Injectable (LANTUS) 54 Unit(s) SubCutaneous <User Schedule>  insulin lispro (ADMELOG) corrective regimen sliding scale   SubCutaneous three times a day before meals  insulin lispro (ADMELOG) corrective regimen sliding scale   SubCutaneous at bedtime  insulin lispro Injectable (ADMELOG) 8 Unit(s) SubCutaneous three times a day before meals  lactobacillus acidophilus 1 Tablet(s) Oral three times a day  losartan 25 milliGRAM(s) Oral daily  metoprolol tartrate 75 milliGRAM(s) Oral two times a day  mupirocin 2% Ointment 1 Application(s) Topical <User Schedule>  tacrolimus 1.5 milliGRAM(s) Oral two times a day  trimethoprim   80 mG/sulfamethoxazole 400 mG 1 Tablet(s) Oral daily    MEDICATIONS  (PRN):  acetaminophen     Tablet .. 650 milliGRAM(s) Oral every 6 hours PRN Temp greater or equal to 38C (100.4F), Mild Pain (1 - 3)  melatonin 3 milliGRAM(s) Oral at bedtime PRN Insomnia  ondansetron Injectable 4 milliGRAM(s) IV Push every 8 hours PRN Nausea and/or Vomiting          Vital Signs Last 24 Hrs  T(C): 36.5 (15 Feb 2022 04:33), Max: 36.9 (14 Feb 2022 15:58)  T(F): 97.7 (15 Feb 2022 04:33), Max: 98.5 (14 Feb 2022 15:58)  HR: 67 (15 Feb 2022 10:29) (62 - 72)  BP: 144/66 (15 Feb 2022 04:33) (119/71 - 144/66)  BP(mean): --  RR: 18 (15 Feb 2022 04:33) (18 - 18)  SpO2: 94% (15 Feb 2022 10:29) (93% - 97%)          02-14 @ 07:01  -  02-15 @ 07:00  --------------------------------------------------------  IN: 840 mL / OUT: 0 mL / NET: 840 mL          LABS:                        13.4   6.49  )-----------( 244      ( 14 Feb 2022 08:53 )             43.4     02-14    141  |  99  |  20  ----------------------------<  132<H>  4.5   |  33<H>  |  1.44<H>    Ca    9.7      14 Feb 2022 08:53  Mg     2.1     02-14            CAPILLARY BLOOD GLUCOSE      POCT Blood Glucose.: 182 mg/dL (15 Feb 2022 08:22)                        CULTURES:     Culture - Blood (collected 02-08-22 @ 17:35)  Source: .Blood Blood-Peripheral  Final Report (02-13-22 @ 18:00):    No Growth Final    Culture - Blood (collected 02-08-22 @ 17:35)  Source: .Blood Blood-Peripheral  Final Report (02-13-22 @ 18:00):    No Growth Final        Culture - Urine (collected 02-08-22 @ 21:14)  Source: Clean Catch Clean Catch (Midstream)  Final Report (02-09-22 @ 16:17):    No growth            Physical Examination:  PULM: Clear to auscultation bilaterally, no significant sputum production  CVS: S1, S2 heard    RADIOLO< from: CT Angio Chest PE Protocol w/ IV Cont (02.08.22 @ 18:36) >  FINDINGS:    PULMONARY ANGIOGRAM: No main, central, lobar, segmental or subsegmental   pulmonary embolus.    LYMPH NODES: No mediastinal and/or hilar lymphadenopathy.    HEART/VASCULATURE: The heart size is normal. No pericardial effusion.   Coronary artery calcifications.    AIRWAYS/LUNGS/PLEURA: Central airways are patent. Right lower lobe   partial passive atelectasis. Right middle lobe, lingular and left lower   lobe linear atelectasis. Mild/moderate right pleural effusion. No   pneumothorax.    UPPER ABDOMEN: Partially imaged mildly distended gallbladder containing   hyperdensity, may represents sludge. Foci of nondependent air is also   noted likely within the gallbladder lumen. Trace pericholecystic fat   stranding. The right hemidiaphragm is elevated.    BONES/SOFT TISSUES: Degenerative changes of the bones. Left anterior   chest wall subcutaneous tissue cardiac loop recorder.    Heterogeneity of the partially imaged thyroid gland containing tiny   hypodense nodules and calcifications as on the prior study.    IMPRESSION:    No pulmonary embolus.    Partially imaged distended gallbladder containing sludge with trace   pericholecystic fat stranding. A few foci of nondependent air likely   within the gallbladder lumen. Findings are worrisome for acute   cholecystitis. Recommend further evaluation with dedicated right upper   quadrant sonogram.    Small to moderate right pleural effusion with adjacent right lower lobe   partial compressive atelectasis.    < end of copied text >  GY REVIEWED  CXR 2/14: small R effusion/atelectasis     CT chest:

## 2022-02-15 NOTE — PROGRESS NOTE ADULT - PROBLEM SELECTOR PLAN 2
HIDA negative  - zosyn iv  - ID eval appreciated
HIDA negative/ Ruled out
-Transplant nephro f/up appreciated  - c/w tacrolimus bid dosing, monitor am tacro level  - Transplant nephro f/up appreciated
he has pretty small effusion on rt side and most of the fluid I s in the fissure: he has no pian today and has no fever or high wbc count: he is on tacrolimus: is immunocompromised ; need to be watched: On antibiotics already
HIDA negative  - zosyn iv  - ID eval appreciated
CTA chest with small chronic R effusion with assocated atelectasis  -ID recs noted, concern for possible empyema given RUQ pain with negative HIDA scan. Low suspicion for empyema, will hold off on thoracentesis at this time.  -CXR ordered
CTA chest with small chronic R effusion with assocated atelectasis  -ID recs noted, concern for possible empyema given RUQ pain with negative HIDA scan. Low suspicion for empyema, will hold off on thoracentesis at this time.  -CXR 2/14 with small R effusion/atelectasis
confirmed on RUQ u/s  - appreciate surgery eval - not a surgical candidate but recommended for IR c/s  - IR consulted for perc cholecystostomy tub  - NPO currently, IVF x24 hrs ordered  - zosyn iv  - check GI PCR given loose stools
-Transplant nephro f/up appreciated  - c/w tacrolimus bid dosing, monitor am tacro level  - Transplant nephro f/up appreciated
HIDA negative/ Ruled out

## 2022-02-15 NOTE — DISCHARGE NOTE NURSING/CASE MANAGEMENT/SOCIAL WORK - NSDCVIVACCINE_GEN_ALL_CORE_FT
influenza, injectable, quadrivalent, preservative free; 27-Nov-2015 13:32; Jv Zhu (RN); [employer]; [id number]; IntraMuscular; Deltoid Right.; 0.5 milliLiter(s); VIS (VIS Published: 07-Aug-2015, VIS Presented: 27-Nov-2015);   influenza, injectable, quadrivalent, preservative free; 22-Sep-2021 13:06; Eugene Mccarthy (RN); [employer]; [id number] (Exp. Date: 30-Jun-2022); IntraMuscular; Deltoid Left.; 0.5 milliLiter(s); VIS (VIS Published: 15-Aug-2019, VIS Presented: 22-Sep-2021);

## 2022-02-15 NOTE — PROGRESS NOTE ADULT - SUBJECTIVE AND OBJECTIVE BOX
[facility], Division of Infectious Diseases  MEL Mckoy Y. Patel, S. Shah, G. Casimir  [phone]  ([phone] - weekdays after 5pm and weekends)    Name: LUTHER NORIEGA  Age/Gender: 56y Male  MRN: [id number]    Interval History:  Patient seen this morning.   Notes reviewed.   No concerning overnight events.  Afebrile.   states he feels well    Allergies: No Known Allergies      Objective:  Vitals:   T(F): 97.7 (02-15-22 @ 04:33), Max: 98.5 (02-14-22 @ 15:58)  HR: 67 (02-15-22 @ 10:29) (62 - 72)  BP: 144/66 (02-15-22 @ 04:33) (119/71 - 144/66)  RR: 18 (02-15-22 @ 04:33) (18 - 18)  SpO2: 94% (02-15-22 @ 10:29) (93% - 97%)  Physical Examination:  General: no acute distress, nontoxic appearing  HEENT: anicteric  Cardio: S1, S2 present, normal rate  Resp: decreased breath sounds R lung base  Abd: soft, ND, NT  Ext: no edema  Skin: warm, dry, no visible rash   Lines:    Laboratory Studies:  CBC:                       13.4   6.49  )-----------( 244      ( 14 Feb 2022 08:53 )             43.4     WBC Trend:  6.49 02-14-22 @ 08:53  6.09 02-11-22 @ 07:09  6.85 02-10-22 @ 09:15  8.08 02-08-22 @ 15:06    CMP: 02-14    141  |  99  |  20  ----------------------------<  132<H>  4.5   |  33<H>  |  1.44<H>    Ca    9.7      14 Feb 2022 08:53  Mg     2.1     02-14              Microbiology: reviewed     Culture - Urine (collected 02-08-22 @ 21:14)  Source: Clean Catch Clean Catch (Midstream)  Final Report (02-09-22 @ 16:17):    No growth    Culture - Blood (collected 02-08-22 @ 17:35)  Source: .Blood Blood-Peripheral  Final Report (02-13-22 @ 18:00):    No Growth Final    Culture - Blood (collected 02-08-22 @ 17:35)  Source: .Blood Blood-Peripheral  Final Report (02-13-22 @ 18:00):    No Growth Final      Radiology: reviewed     Medications:  acetaminophen     Tablet .. 650 milliGRAM(s) Oral every 6 hours PRN  aspirin  chewable 81 milliGRAM(s) Oral daily  atorvastatin 10 milliGRAM(s) Oral at bedtime  cloNIDine 0.1 milliGRAM(s) Oral two times a day  dextrose 40% Gel 15 Gram(s) Oral once  dextrose 5%. 1000 milliLiter(s) IV Continuous <Continuous>  dextrose 5%. 1000 milliLiter(s) IV Continuous <Continuous>  dextrose 50% Injectable 25 Gram(s) IV Push once  dextrose 50% Injectable 12.5 Gram(s) IV Push once  dextrose 50% Injectable 25 Gram(s) IV Push once  enoxaparin Injectable 40 milliGRAM(s) SubCutaneous every 12 hours  famotidine    Tablet 20 milliGRAM(s) Oral daily  gabapentin 300 milliGRAM(s) Oral two times a day  glucagon  Injectable 1 milliGRAM(s) IntraMuscular once  hydrALAZINE 25 milliGRAM(s) Oral three times a day  insulin glargine Injectable (LANTUS) 54 Unit(s) SubCutaneous <User Schedule>  insulin lispro (ADMELOG) corrective regimen sliding scale   SubCutaneous three times a day before meals  insulin lispro (ADMELOG) corrective regimen sliding scale   SubCutaneous at bedtime  insulin lispro Injectable (ADMELOG) 8 Unit(s) SubCutaneous three times a day before meals  lactobacillus acidophilus 1 Tablet(s) Oral three times a day  losartan 25 milliGRAM(s) Oral daily  melatonin 3 milliGRAM(s) Oral at bedtime PRN  metoprolol tartrate 75 milliGRAM(s) Oral two times a day  mupirocin 2% Ointment 1 Application(s) Topical <User Schedule>  ondansetron Injectable 4 milliGRAM(s) IV Push every 8 hours PRN  tacrolimus 1.5 milliGRAM(s) Oral two times a day  trimethoprim   80 mG/sulfamethoxazole 400 mG 1 Tablet(s) Oral daily    Antimicrobials:  trimethoprim   80 mG/sulfamethoxazole 400 mG 1 Tablet(s) Oral daily

## 2022-02-15 NOTE — PROGRESS NOTE ADULT - PROVIDER SPECIALTY LIST ADULT
Endocrinology
Infectious Disease
Intervent Radiology
Infectious Disease
Internal Medicine
Transplant Nephrology
Transplant Nephrology
Trauma Surgery
Podiatry
Transplant Nephrology
Transplant Nephrology
Endocrinology
Endocrinology
Pulmonology
Pulmonology
Internal Medicine
Internal Medicine
Pulmonology
Internal Medicine

## 2022-02-15 NOTE — PROGRESS NOTE ADULT - PROBLEM SELECTOR PROBLEM 2
Pleural effusion
Stage 2 chronic kidney disease
Acute cholecystitis
Stage 2 chronic kidney disease
Stage 2 chronic kidney disease
Acute cholecystitis
Stage 2 chronic kidney disease
Pleural effusion
Acute cholecystitis
Stage 2 chronic kidney disease
Acute cholecystitis
Pleural effusion
Acute cholecystitis

## 2022-02-15 NOTE — PROGRESS NOTE ADULT - PROBLEM SELECTOR PROBLEM 1
Acute on chronic respiratory failure with hypoxia
Renal transplant recipient
Benign essential HTN
Hypoxia
Renal transplant recipient
Acute on chronic respiratory failure with hypoxia
Acute on chronic respiratory failure with hypoxia
Benign essential HTN
Acute on chronic respiratory failure with hypoxia
Benign essential HTN
Acute on chronic respiratory failure with hypoxia
Acute on chronic respiratory failure with hypoxia
Hypoxia
Acute on chronic respiratory failure with hypoxia
Hypoxia

## 2022-02-15 NOTE — PROGRESS NOTE ADULT - PROBLEM SELECTOR PLAN 4
FSSS  Lantus/Premeal
cont immunosupressives
Cont home meds - losartan, hydralazine, clonidine
-Per renal transplant.
FSSS  Lantus/Premeal
hypoglycemic episode in the setting of insulin pump and NPO status, resolved s/p amp of D50  - seen by endo following for management of insulin pump, appreciate recs  - monitor fs q6h while npo
hypoglycemic episode in the setting of insulin pump and NPO status, resolved s/p amp of D50  - seen by endo following for management of insulin pump, appreciate recs  - monitor fs q6h while npo
-Per renal transplant.
Cont home meds - losartan, hydralazine, clonidine
hypoglycemic episode in the setting of insulin pump and NPO status, resolved s/p amp of D50  - seen by endo following for management of insulin pump, appreciate recs  - monitor fs q6h while npo

## 2022-02-15 NOTE — DISCHARGE NOTE NURSING/CASE MANAGEMENT/SOCIAL WORK - NSDCPEFALRISK_GEN_ALL_CORE
For information on Fall & Injury Prevention, visit: [email or web address] OR  [email or web address] OR  https://www.cdc.gov/steadi/patient.html

## 2022-02-15 NOTE — DISCHARGE NOTE NURSING/CASE MANAGEMENT/SOCIAL WORK - NSDCCRNAME_GEN_ALL_CORE_FT
[facility]  [address], [facility], [address]

## 2022-02-15 NOTE — PROGRESS NOTE ADULT - PROBLEM SELECTOR PLAN 1
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  -ABG results noted   -Incentive spirometry use encouraged   -Hypoxia resolved, keep sats >90% with O2 PRN   -D/c planning per primary team

## 2022-02-15 NOTE — PROGRESS NOTE ADULT - PROBLEM SELECTOR PLAN 5
-HIDA scan negative  -Abx as per ID
elevated  - resume home meds - losartan, hydralazine, clonodine
DVT PPX: lovenox   no PT needs
-HIDA scan negative  -Abx as per ID
Cont home meds - losartan, hydralazine, clonidine
Cont home meds - losartan, hydralazine, clonidine
DVT PPX: lovenox   no PT needs
scan ruled out
Cont home meds - losartan, hydralazine, clonidine
Cont home meds - losartan, hydralazine, clonidine

## 2022-02-15 NOTE — PHARMACOTHERAPY INTERVENTION NOTE - COMMENTS
Spoke to patient about indication, directions, and side effect profile of medications (home meds; no new meds Rx'd). Patient demonstrated good understanding of medications (name/indication). Described no side effects on home regimen (see below). Patient was administered basal/bolus regimen with lantus/admelog while inpatient. Per endo instructions patient to resume insulin pump ~22 hours after last lantus dose. Patient to received a dose ~5pm today and resume pump ~3pm tomorrow; discussed with pt and RN. Discharge medication info packet provided.    Home Medications:  aspirin 81 mg oral tablet: 1 tab(s) orally once a day   Bactrim 400 mg-80 mg oral tablet: 1 tab(s) orally once a day  cloNIDine 0.1 mg oral tablet: 1 tab(s) orally 2 times a day   famotidine 20 mg oral tablet: 1 tab(s) orally once a day   gabapentin 300 mg oral capsule: 1 cap(s) orally 2 times a day  HumaLOG 100 units/mL injectable solution: Can resume insulin pump approximately 22 hours after last Lantus dose  He will f/u with Dr Garcia as out-pt or at Endocrine Office [address] [place] [address] [phone]  His pump is not properly setup, but he has been managing on the pump for 3yrs. NO DKA or hospitalization, so, it would be safe to d/c him on it per endocrine   hydrALAZINE 25 mg oral tablet: 1 tab(s) orally 3 times a day  lactobacillus acidophilus oral capsule: 1 tab(s) orally 3 times a day  lovastatin 40 mg oral tablet: 1 tab(s) orally once a day   Metoprolol Tartrate 25 mg oral tablet: 3 tab(s) orally 2 times a day  mupirocin 2% topical ointment: Apply topically to affected area once a day - R foot   tacrolimus: 1.5 milligram(s) orally 2 times a day (1mg and 0.5mg caps)     Star Gupta, PharmD, BCPS  [phone]  Available on [employer] Teams

## 2022-02-15 NOTE — PROGRESS NOTE ADULT - ATTENDING COMMENTS
56y Male with PMH of ESRD s/p LDRT, DM type 1, HTN, restrictive lung disease and CARMEN, DVT, CVA, squamous cell carcinoma, osteomyelitis, presenting with cholecystitis with intraluminal and intramural air in the gallbladder. IR consulted for evaluation of percutaneous cholecystostomy.    -- No acute intervention from an IR perspective. HIDA negative for acute cholecystitis.
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    1.s/p LDRT in 2013-  allograft function is slightly down. Baseline SCr is 1.1-1.2. SCr today is 1.4 likely in the setting of SRI (received 2 CTAs, one here  one at OSH).   Send UA, urine electrolytes,  Check bladder scan and Benitez catheter placement if retaining. Hold losartan.     2. IS meds- continue tac 1.5 mg po bid ( goal level is 4-6) ,  imuran 100 mg daily  3. RUQ abdominal pain - Abd US with  Mildly distended gallbladder with diffuse wall thickening containing intraluminal air and possible air within the wall. CT showed Mildly distended gallbladder with mild wall thickening and intraluminal versus intramural gas, as identified on the prior ultrasound, potentially reflecting emphysematous cystitis. HIDA-negative for acute fish. s/p surgery consult- not a surgical candidate  4. Acute on chronic respiratory failure with hypoxia. CTA neg for PE or PNA, but does show small to moderate R pleural effusion and atelectasis likely compressive atelectasis
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    1.s/p LDRT in 2013- Allograft function at baseline   Baseline SCr is 1.1-1.2.  2. IS meds- continue tac 1.5 mg po bid ( goal level is 4-6) ,  imuran 100 mg daily  3. RUQ abdominal pain - Abd US with  Mildly distended gallbladder with diffuse wall thickening containing intraluminal air and possible air within the wall. CT showed Mildly distended gallbladder with mild wall thickening and intraluminal versus intramural gas, as identified on the prior ultrasound, potentially reflecting emphysematous cystitis. HIDA-negative for acute fish. s/p surgery consult- not a surgical candidate  4. Acute on chronic respiratory failure with hypoxia. CTA neg for PE or PNA, but does show small to moderate R pleural effusion and atelectasis likely compressive atelectasis
pt now on ra sat 94-95% no distress,hypoxia likely secondary to atelectasis  had bipap machine delivered to home  fu in office
wean to 2l nc and keep sat>90%  may require home o2

## 2022-02-15 NOTE — PROGRESS NOTE ADULT - ASSESSMENT
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    1.s/p LDRT in 2013-  allograft function is slightly down. Baseline SCr is 1.1-1.2. SCr on 2/14 was 1.4 likely in the setting of SRI (received 2 CTAs, one here  one at OSH). Await BMP today.   Send UA, urine electrolytes, spot urine TP/CR. Check bladder scan & Transplant kidney duplex. Recommend Benitez catheter placement if retaining. Would give IVF NS for now. Hold losartan. Monitor labs and urine output. Avoid NSAIDs, ACEI/ARBS, RCA and nephrotoxins. Dose medications as per eGFR.      2. IS meds- Tac level pending today. continue tac 1.5 mg po bid ( goal level is 4-6) ,  imuran 100 mg daily  3. RUQ abdominal pain - Abd US with  Mildly distended gallbladder with diffuse wall thickening containing intraluminal air and possible air within the wall. CT showed Mildly distended gallbladder with mild wall thickening and intraluminal versus intramural gas, as identified on the prior ultrasound, potentially reflecting emphysematous cystitis. HIDA-negative for acute fish. s/p surgery consult- not a surgical candidate  4. Acute on chronic respiratory failure with hypoxia. CTA neg for PE or PNA, but does show small to moderate R pleural effusion and atelectasis likely compressive atelectasis  Off O2 . saturating at 98% on RA      If you have any questions, please feel free to contact me  Nayla Rogers  Nephrology Fellow  Pager NS: [phone]/ [facility]: [phone]    (After 5 pm or on weekends please page the on-call fellow, can check [email or web address] for schedule. Login is blaze hernandez, schedule under [facility] medicine, psych, derm)

## 2022-02-15 NOTE — PROGRESS NOTE ADULT - ASSESSMENT
57 yo M with type 1 DM on insulin pump complicated by CVA (2015 with residual left sided numbness), ESRD s/p renal transplant, PAD s/p amputation, also with HTN, chronic lung disease on 2L intermittently admitted for hypoxia and cholecystitis.      1. Type 1 DM  Discussed with patient the importance of Carb consistent diet and exercise as tolerated.    Recommend nutritional consultation  Recommend DM education through RN staff (see physical to RN order)  Discussed glycemic goal of a1c <7% to prevent microvascular complications of diabetes mellitus and reduce the risk of macrovascular complications.   Recommend annual podiatry and ophthalmology follow up.   Glucose goal of 100-180mg/dl while in patient.   Recommend Lantus 54 units at bedtime  Recommend Admelog 6 units tid with meals  Recommend LDSS (1:50 above 150mg/dl) and night time low does sliding scale for hyperglycemia corrections    Discharge recommendation/considerations:  Likely back on insulin pump.        - please monitor fingerstick blood glucose at least 4 times a day to avoid insulin toxicity, hypoglycemia; Blood glucose target while hospitalized 140-180 mg/dL  - Carbohydrate controlled diet, no concentrated sweets  - Counseled on need for medication adherence to avoid new complications.   - hypoglycemia protocol    # Hypertension: BP goal <130/80; defer to primary team    # Chronic Kidney disease - cautious insulin titraiton; high risk for insulin stacking and hypoglycemia    DM Discharge planning  Please contact diabetes team prior to patient's discharge for finalized regimen. Please allow 24-48 hours for discharge planning.   His pump is not properly setup, but he has been managing on the pump for last 3 years. NO DKA or hospitalization, so, it would be safe to d/c him on it.    He has private endocrinologist, but he would like to follow with us in the clinic at [address] [place] [address].  Please call [phone].  We will also try to get him an appointment as he gets closer to the discharge.     57 yo M with type 1 DM on insulin pump complicated by CVA (2015 with residual left sided numbness), ESRD s/p renal transplant, PAD s/p amputation, also with HTN, chronic lung disease on 2L intermittently admitted for hypoxia and cholecystitis.      1. Type 1 DM  Discussed with patient the importance of Carb consistent diet and exercise as tolerated.    Recommend nutritional consultation  Recommend DM education through RN staff (see physical to RN order)  Discussed glycemic goal of a1c <7% to prevent microvascular complications of diabetes mellitus and reduce the risk of macrovascular complications.   Recommend annual podiatry and ophthalmology follow up.   Glucose goal of 100-180mg/dl while in patient.   Recommend Lantus 54 units at bedtime  Recommend Admelog 8 units tid with meals  Recommend LDSS (1:50 above 150mg/dl) and night time low does sliding scale for hyperglycemia corrections  - please monitor fingerstick blood glucose at least 4 times a day to avoid insulin toxicity, hypoglycemia; Blood glucose target while hospitalized 140-180 mg/dL  - Carbohydrate controlled diet, no concentrated sweets  - Counseled on need for medication adherence to avoid new complications.   - hypoglycemia protocol    Discharge recommendation/considerations:  Patient can resume insulin pump approximately 22 hours after last Lantus dose  He will f/u with Dr Garcia as outpt or at Endocrine Office [address] [place] [address] [phone]  His pump is not properly setup, but he has been managing on the pump for last 3 years. NO DKA or hospitalization, so, it would be safe to d/c him on it.         # Hypertension: BP goal <130/80; defer to primary team    # Chronic Kidney disease - cautious insulin titration, high risk for insulin stacking and hypoglycemia    Tania Hendricks (pager [phone])  On evenings and weekends, please call [phone] or page endocrine fellow on call.   Please note that this patient may be followed by different provider tomorrow. If no answer, contact endocrine fellow on call.

## 2022-02-15 NOTE — PROGRESS NOTE ADULT - REASON FOR ADMISSION
SOB, abd pain

## 2022-02-15 NOTE — PROGRESS NOTE ADULT - SUBJECTIVE AND OBJECTIVE BOX
[facility] DIVISION OF KIDNEY DISEASES AND HYPERTENSION -- FOLLOW UP NOTE  --------------------------------------------------------------------------------      24 hour events/subjective: Pateint seen & examined. Vitals/ labs/ medications reviewed. SOB improved. off O2 this AM.         PAST HISTORY  --------------------------------------------------------------------------------  No significant changes to PMH, PSH, FHx, SHx, unless otherwise noted    ALLERGIES & MEDICATIONS  --------------------------------------------------------------------------------  Allergies    No Known Allergies    Intolerances      Standing Inpatient Medications  aspirin  chewable 81 milliGRAM(s) Oral daily  atorvastatin 10 milliGRAM(s) Oral at bedtime  cloNIDine 0.1 milliGRAM(s) Oral two times a day  dextrose 40% Gel 15 Gram(s) Oral once  dextrose 5%. 1000 milliLiter(s) IV Continuous <Continuous>  dextrose 5%. 1000 milliLiter(s) IV Continuous <Continuous>  dextrose 50% Injectable 25 Gram(s) IV Push once  dextrose 50% Injectable 12.5 Gram(s) IV Push once  dextrose 50% Injectable 25 Gram(s) IV Push once  enoxaparin Injectable 40 milliGRAM(s) SubCutaneous every 12 hours  famotidine    Tablet 20 milliGRAM(s) Oral daily  gabapentin 300 milliGRAM(s) Oral two times a day  glucagon  Injectable 1 milliGRAM(s) IntraMuscular once  hydrALAZINE 25 milliGRAM(s) Oral three times a day  insulin glargine Injectable (LANTUS) 54 Unit(s) SubCutaneous <User Schedule>  insulin lispro (ADMELOG) corrective regimen sliding scale   SubCutaneous three times a day before meals  insulin lispro (ADMELOG) corrective regimen sliding scale   SubCutaneous at bedtime  insulin lispro Injectable (ADMELOG) 8 Unit(s) SubCutaneous three times a day before meals  lactobacillus acidophilus 1 Tablet(s) Oral three times a day  losartan 25 milliGRAM(s) Oral daily  metoprolol tartrate 75 milliGRAM(s) Oral two times a day  mupirocin 2% Ointment 1 Application(s) Topical <User Schedule>  tacrolimus 1.5 milliGRAM(s) Oral two times a day  trimethoprim   80 mG/sulfamethoxazole 400 mG 1 Tablet(s) Oral daily    PRN Inpatient Medications  acetaminophen     Tablet .. 650 milliGRAM(s) Oral every 6 hours PRN  melatonin 3 milliGRAM(s) Oral at bedtime PRN  ondansetron Injectable 4 milliGRAM(s) IV Push every 8 hours PRN      REVIEW OF SYSTEMS  --------------------------------------------------------------------------------  Gen: No fatigue, fevers/chills, weakness  Head/Eyes/Ears/Mouth: No headache;No sore throat  Respiratory: No dyspnea, cough,   CV: No chest pain, PND, orthopnea  GI: No abdominal pain, diarrhea, constipation, nausea, vomiting  Transplant: No pain  : No increased frequency, dysuria, hematuria, nocturia  MSK: No joint pain/swelling; no back pain; no edema  Neuro: No dizziness/lightheadedness, weakness      All other systems were reviewed and are negative, except as noted.    VITALS/PHYSICAL EXAM  --------------------------------------------------------------------------------  T(C): 36.5 (02-15-22 @ 04:33), Max: 36.9 (02-14-22 @ 15:58)  HR: 67 (02-15-22 @ 10:29) (62 - 72)  BP: 144/66 (02-15-22 @ 04:33) (119/71 - 144/66)  RR: 18 (02-15-22 @ 04:33) (18 - 18)  SpO2: 94% (02-15-22 @ 10:29) (93% - 97%)  Wt(kg): --        02-14-22 @ 07:01  -  02-15-22 @ 07:00  --------------------------------------------------------  IN: 840 mL / OUT: 0 mL / NET: 840 mL      Physical Exam:  	Gen: NAD, well-appearing, obese  	HEENT: supple neck  	Pulm: CTA B/L  	CV: RRR, S1S2; no rub  	Back:  no sacral edema  	Abd: +BS, soft, nontender/nondistended          Transplant: No tenderness, swelling  	Ext: + edema b/l  	Neuro: No focal deficits  	Psych: Normal affect and mood  	Skin: Warm, without rashes        LABS/STUDIES  --------------------------------------------------------------------------------              13.4   6.49  >-----------<  244      [02-14-22 @ 08:53]              43.4     141  |  99  |  20  ----------------------------<  132      [02-14-22 @ 08:53]  4.5   |  33  |  1.44        Ca     9.7     [02-14-22 @ 08:53]      Mg     2.1     [02-14-22 @ 08:53]            Creatinine Trend:  SCr 1.44 [02-14 @ 08:53]  SCr 1.18 [02-11 @ 07:07]  SCr 1.18 [02-10 @ 09:15]  SCr 1.19 [02-08 @ 15:06]    Tacrolimus (), Serum: 5.6 ng/mL (02-14 @ 10:15)  Tacrolimus (), Serum: 4.2 ng/mL (02-13 @ 09:56)  Tacrolimus (), Serum: 5.3 ng/mL (02-12 @ 11:28)  Tacrolimus (), Serum: 5.9 ng/mL (02-11 @ 10:25)            Urinalysis - [02-08-22 @ 17:38]      Color Yellow / Appearance Clear / SG 1.023 / pH 6.0      Gluc 100 mg/dL / Ketone Negative  / Bili Negative / Urobili 3 mg/dL       Blood Small / Protein 100 / Leuk Est Negative / Nitrite Negative      RBC 18 / WBC 3 / Hyaline 2 / Gran  / Sq Epi  / Non Sq Epi 1 / Bacteria Negative      HbA1c 7.1      [12-17-19 @ 10:59]

## 2022-02-17 ENCOUNTER — APPOINTMENT (OUTPATIENT)
Dept: WOUND CARE | Facility: HOSPITAL | Age: 57
End: 2022-02-17
Payer: MEDICARE

## 2022-02-17 ENCOUNTER — OUTPATIENT (OUTPATIENT)
Dept: OUTPATIENT SERVICES | Facility: HOSPITAL | Age: 57
LOS: 1 days | Discharge: HOME | End: 2022-02-17
Payer: MEDICARE

## 2022-02-17 VITALS
RESPIRATION RATE: 19 BRPM | SYSTOLIC BLOOD PRESSURE: 137 MMHG | HEART RATE: 65 BPM | DIASTOLIC BLOOD PRESSURE: 76 MMHG | TEMPERATURE: 98.5 F | OXYGEN SATURATION: 95 %

## 2022-02-17 DIAGNOSIS — N18.6 END STAGE RENAL DISEASE: Chronic | ICD-10-CM

## 2022-02-17 DIAGNOSIS — Z86.73 PERSONAL HISTORY OF TRANSIENT ISCHEMIC ATTACK (TIA), AND CEREBRAL INFARCTION WITHOUT RESIDUAL DEFICITS: ICD-10-CM

## 2022-02-17 DIAGNOSIS — Z79.899 OTHER LONG TERM (CURRENT) DRUG THERAPY: ICD-10-CM

## 2022-02-17 DIAGNOSIS — Z83.3 FAMILY HISTORY OF DIABETES MELLITUS: ICD-10-CM

## 2022-02-17 DIAGNOSIS — E66.01 MORBID (SEVERE) OBESITY DUE TO EXCESS CALORIES: ICD-10-CM

## 2022-02-17 DIAGNOSIS — E11.610 TYPE 2 DIABETES MELLITUS WITH DIABETIC NEUROPATHIC ARTHROPATHY: ICD-10-CM

## 2022-02-17 DIAGNOSIS — E78.5 HYPERLIPIDEMIA, UNSPECIFIED: ICD-10-CM

## 2022-02-17 DIAGNOSIS — E11.51 TYPE 2 DIABETES MELLITUS WITH DIABETIC PERIPHERAL ANGIOPATHY WITHOUT GANGRENE: ICD-10-CM

## 2022-02-17 DIAGNOSIS — Z94.0 KIDNEY TRANSPLANT STATUS: Chronic | ICD-10-CM

## 2022-02-17 DIAGNOSIS — Z80.3 FAMILY HISTORY OF MALIGNANT NEOPLASM OF BREAST: ICD-10-CM

## 2022-02-17 DIAGNOSIS — Z89.421 ACQUIRED ABSENCE OF OTHER RIGHT TOE(S): ICD-10-CM

## 2022-02-17 DIAGNOSIS — E11.621 TYPE 2 DIABETES MELLITUS WITH FOOT ULCER: ICD-10-CM

## 2022-02-17 DIAGNOSIS — Z86.16 PERSONAL HISTORY OF COVID-19: ICD-10-CM

## 2022-02-17 DIAGNOSIS — Z80.8 FAMILY HISTORY OF MALIGNANT NEOPLASM OF OTHER ORGANS OR SYSTEMS: ICD-10-CM

## 2022-02-17 DIAGNOSIS — Z86.718 PERSONAL HISTORY OF OTHER VENOUS THROMBOSIS AND EMBOLISM: ICD-10-CM

## 2022-02-17 DIAGNOSIS — L97.412 NON-PRESSURE CHRONIC ULCER OF RIGHT HEEL AND MIDFOOT WITH FAT LAYER EXPOSED: ICD-10-CM

## 2022-02-17 DIAGNOSIS — Z89.421 ACQUIRED ABSENCE OF OTHER RIGHT TOE(S): Chronic | ICD-10-CM

## 2022-02-17 DIAGNOSIS — Z94.0 KIDNEY TRANSPLANT STATUS: ICD-10-CM

## 2022-02-17 DIAGNOSIS — Z87.81 PERSONAL HISTORY OF (HEALED) TRAUMATIC FRACTURE: ICD-10-CM

## 2022-02-17 DIAGNOSIS — G47.33 OBSTRUCTIVE SLEEP APNEA (ADULT) (PEDIATRIC): ICD-10-CM

## 2022-02-17 DIAGNOSIS — Z85.828 PERSONAL HISTORY OF OTHER MALIGNANT NEOPLASM OF SKIN: ICD-10-CM

## 2022-02-17 DIAGNOSIS — Z89.422 ACQUIRED ABSENCE OF OTHER LEFT TOE(S): ICD-10-CM

## 2022-02-17 DIAGNOSIS — Z79.4 LONG TERM (CURRENT) USE OF INSULIN: ICD-10-CM

## 2022-02-17 DIAGNOSIS — L84 CORNS AND CALLOSITIES: ICD-10-CM

## 2022-02-17 DIAGNOSIS — Z79.82 LONG TERM (CURRENT) USE OF ASPIRIN: ICD-10-CM

## 2022-02-17 PROCEDURE — G0463: CPT

## 2022-02-17 PROCEDURE — 99213 OFFICE O/P EST LOW 20 MIN: CPT

## 2022-02-17 NOTE — REVIEW OF SYSTEMS
[Fever] : no fever [Chills] : no chills [Eye Pain] : no eye pain [Loss Of Hearing] : no hearing loss [Shortness Of Breath] : no shortness of breath [Abdominal Pain] : no abdominal pain [Vomiting] : no vomiting [Skin Lesions] : no skin lesions [Skin Wound] : no skin wound [Anxiety] : no anxiety [Easy Bleeding] : no tendency for easy bleeding [FreeTextEntry5] : HTN,HLD [FreeTextEntry7] : 40.9 BMI , morbid obesity  [FreeTextEntry8] : Kidney Transplant  [FreeTextEntry9] : Associated Diabetic Charcot foot  [de-identified] : plantar right foot midfoot , lateral  DFU 2  , large area of peripheral callus build up , the ulcer is clean and granular [de-identified] : IDDM with neuropathy  [de-identified] : MARILUZ

## 2022-02-17 NOTE — HISTORY OF PRESENT ILLNESS
[FreeTextEntry1] : Pt seen for right plantar diabetic foot ulcer down to skin, subcutaneous tissue, and fat. Pt also seen for new right lateral 5th metatarsal ulcer down to skin, subcutaneous tissue, and fat. Pt currently in a CROW, change dressing every other day, patient has a renal transplant.

## 2022-02-17 NOTE — PHYSICAL EXAM
[4 x 4] : 4 x 4  [Abdominal Pad] : Abdominal Pad [0] : left 0 [1+] : left 1+ [Varicose Veins Of Lower Extremities] : not present [] : not present [Purpura] : no purpura  [Petechiae] : no petechiae [Skin Ulcer] : ulcer [Skin Induration] : no induration [Alert] : alert [Oriented to Person] : oriented to person [Oriented to Place] : oriented to place [Oriented to Time] : oriented to time [Calm] : calm [de-identified] : adult WM, NAD, alert, Ox 3. [de-identified] : HTN, HLD [de-identified] : Diabetic Charcot right foot deformity [de-identified] : right foot plantar ulcer down to skin, subcutaneous tissue, fat. new right lateral 5th metatarsal ulcer down to skin, subcutaneous tissue, and fat. [de-identified] : Diabetic neuropathy  [FreeTextEntry1] : Right plantar foot  [FreeTextEntry2] : 2.4 [FreeTextEntry3] : 2 [FreeTextEntry4] : 0.1 [de-identified] : Serous/sanguinous [de-identified] : Callus  [de-identified] : Expressed comfort post ACE application. [de-identified] : Silver alginate [de-identified] : Cleansed with NS\par Kerlix  [FreeTextEntry7] : Right foot lateral 5th Metatarsal (New)  [FreeTextEntry8] : 2.1 [FreeTextEntry9] : 1.1 [de-identified] : 0.1 [de-identified] : Serous/sanguinous [de-identified] : callus  [de-identified] : Silver alginate [de-identified] : Expressed comfort post ACE application. [de-identified] : Cleansed with NS\par Kerlix  [TWNoteComboBox4] : Moderate [de-identified] : other [de-identified] : None [de-identified] : None [de-identified] : 100% [de-identified] : No [de-identified] : Ace wraps [de-identified] : Every other day [de-identified] : Primary Dressing [de-identified] : Moderate [de-identified] : other [de-identified] : None [de-identified] : None [de-identified] : 100% [de-identified] : Ace wraps [de-identified] : Every other day [de-identified] : Primary Dressing

## 2022-02-17 NOTE — PLAN
[FreeTextEntry1] : Patient examined and evaluated at this time.\par Continue local wound care and offloading, continue wearing CROW.\par Pt remains a high risk for further amputation, limb loss, sepsis, and death.\par Spent 20 minutes for patient care and medical decision making.\par Patient to follow up in 1 week.\par \par

## 2022-02-17 NOTE — ASSESSMENT
[Verbal] : Verbal [Written] : Written [Demo] : Demo [Patient] : Patient [Good - alert, interested, motivated] : Good - alert, interested, motivated [Verbalizes knowledge/Understanding] : Verbalizes knowledge/understanding [Dressing changes] : dressing changes [Foot Care] : foot care [Skin Care] : skin care [Signs and symptoms of infection] : sign and symptoms of infection [Nutrition] : nutrition [How and When to Call] : how and when to call [Pain Management] : pain management [Off-loading] : off-loading [Compression Therapy] : compression therapy [Patient responsibility to plan of care] : patient responsibility to plan of care [Glycemic Control] : glycemic control [Stable] : stable [Home] : Home [Ambulatory] : Ambulatory [Not Applicable - Long Term Care/Home Health Agency] : Long Term Care/Home Health Agency: Not Applicable [] : No [FreeTextEntry2] : Infection prevention\par Localized wound care \par Goal remaining pain free regarding wounds\par Offloading \par Low glycemic diet  [FreeTextEntry3] : Patient presents with new wound  [FreeTextEntry4] : Follow up in 1 week

## 2022-02-24 ENCOUNTER — APPOINTMENT (OUTPATIENT)
Dept: WOUND CARE | Facility: HOSPITAL | Age: 57
End: 2022-02-24
Payer: MEDICARE

## 2022-02-24 ENCOUNTER — OUTPATIENT (OUTPATIENT)
Dept: OUTPATIENT SERVICES | Facility: HOSPITAL | Age: 57
LOS: 1 days | Discharge: HOME | End: 2022-02-24
Payer: MEDICARE

## 2022-02-24 VITALS
RESPIRATION RATE: 20 BRPM | HEIGHT: 73 IN | SYSTOLIC BLOOD PRESSURE: 130 MMHG | WEIGHT: 315 LBS | HEART RATE: 63 BPM | TEMPERATURE: 97.3 F | OXYGEN SATURATION: 94 % | DIASTOLIC BLOOD PRESSURE: 69 MMHG | BODY MASS INDEX: 41.75 KG/M2

## 2022-02-24 DIAGNOSIS — N18.6 END STAGE RENAL DISEASE: Chronic | ICD-10-CM

## 2022-02-24 DIAGNOSIS — Z80.3 FAMILY HISTORY OF MALIGNANT NEOPLASM OF BREAST: ICD-10-CM

## 2022-02-24 DIAGNOSIS — Z89.421 ACQUIRED ABSENCE OF OTHER RIGHT TOE(S): Chronic | ICD-10-CM

## 2022-02-24 DIAGNOSIS — Z80.8 FAMILY HISTORY OF MALIGNANT NEOPLASM OF OTHER ORGANS OR SYSTEMS: ICD-10-CM

## 2022-02-24 DIAGNOSIS — Z86.16 PERSONAL HISTORY OF COVID-19: ICD-10-CM

## 2022-02-24 DIAGNOSIS — Z86.718 PERSONAL HISTORY OF OTHER VENOUS THROMBOSIS AND EMBOLISM: ICD-10-CM

## 2022-02-24 DIAGNOSIS — Z87.81 PERSONAL HISTORY OF (HEALED) TRAUMATIC FRACTURE: ICD-10-CM

## 2022-02-24 DIAGNOSIS — Z79.82 LONG TERM (CURRENT) USE OF ASPIRIN: ICD-10-CM

## 2022-02-24 DIAGNOSIS — E78.5 HYPERLIPIDEMIA, UNSPECIFIED: ICD-10-CM

## 2022-02-24 DIAGNOSIS — Z86.73 PERSONAL HISTORY OF TRANSIENT ISCHEMIC ATTACK (TIA), AND CEREBRAL INFARCTION WITHOUT RESIDUAL DEFICITS: ICD-10-CM

## 2022-02-24 DIAGNOSIS — Z79.899 OTHER LONG TERM (CURRENT) DRUG THERAPY: ICD-10-CM

## 2022-02-24 DIAGNOSIS — E11.51 TYPE 2 DIABETES MELLITUS WITH DIABETIC PERIPHERAL ANGIOPATHY WITHOUT GANGRENE: ICD-10-CM

## 2022-02-24 DIAGNOSIS — Z89.422 ACQUIRED ABSENCE OF OTHER LEFT TOE(S): ICD-10-CM

## 2022-02-24 DIAGNOSIS — Z85.828 PERSONAL HISTORY OF OTHER MALIGNANT NEOPLASM OF SKIN: ICD-10-CM

## 2022-02-24 DIAGNOSIS — E11.621 TYPE 2 DIABETES MELLITUS WITH FOOT ULCER: ICD-10-CM

## 2022-02-24 DIAGNOSIS — Z94.0 KIDNEY TRANSPLANT STATUS: Chronic | ICD-10-CM

## 2022-02-24 DIAGNOSIS — Z89.421 ACQUIRED ABSENCE OF OTHER RIGHT TOE(S): ICD-10-CM

## 2022-02-24 DIAGNOSIS — E66.01 MORBID (SEVERE) OBESITY DUE TO EXCESS CALORIES: ICD-10-CM

## 2022-02-24 DIAGNOSIS — L97.412 NON-PRESSURE CHRONIC ULCER OF RIGHT HEEL AND MIDFOOT WITH FAT LAYER EXPOSED: ICD-10-CM

## 2022-02-24 DIAGNOSIS — Z94.0 KIDNEY TRANSPLANT STATUS: ICD-10-CM

## 2022-02-24 DIAGNOSIS — E11.610 TYPE 2 DIABETES MELLITUS WITH DIABETIC NEUROPATHIC ARTHROPATHY: ICD-10-CM

## 2022-02-24 DIAGNOSIS — G47.33 OBSTRUCTIVE SLEEP APNEA (ADULT) (PEDIATRIC): ICD-10-CM

## 2022-02-24 DIAGNOSIS — Z83.3 FAMILY HISTORY OF DIABETES MELLITUS: ICD-10-CM

## 2022-02-24 DIAGNOSIS — Z79.4 LONG TERM (CURRENT) USE OF INSULIN: ICD-10-CM

## 2022-02-24 DIAGNOSIS — L84 CORNS AND CALLOSITIES: ICD-10-CM

## 2022-02-24 PROCEDURE — 99213 OFFICE O/P EST LOW 20 MIN: CPT

## 2022-02-24 PROCEDURE — G0463: CPT

## 2022-02-24 NOTE — ASSESSMENT
[Verbal] : Verbal [Written] : Written [Demo] : Demo [Patient] : Patient [Good - alert, interested, motivated] : Good - alert, interested, motivated [Verbalizes knowledge/Understanding] : Verbalizes knowledge/understanding [Dressing changes] : dressing changes [Foot Care] : foot care [Skin Care] : skin care [Signs and symptoms of infection] : sign and symptoms of infection [Nutrition] : nutrition [How and When to Call] : how and when to call [Pain Management] : pain management [Off-loading] : off-loading [Compression Therapy] : compression therapy [Patient responsibility to plan of care] : patient responsibility to plan of care [Glycemic Control] : glycemic control [] : Yes [Stable] : stable [Home] : Home [Ambulatory] : Ambulatory [Not Applicable - Long Term Care/Home Health Agency] : Long Term Care/Home Health Agency: Not Applicable [FreeTextEntry2] : Infection prevention\par Localized wound care \par Goal remaining pain free regarding wounds\par Offloading \par Low glycemic diet \par Vascular consult\par F/U 1 week  [FreeTextEntry4] : DPM ordered a vascular consult, request submitted today.\par F/U 1 week

## 2022-02-24 NOTE — PHYSICAL EXAM
[4 x 4] : 4 x 4  [Abdominal Pad] : Abdominal Pad [0] : left 0 [1+] : left 1+ [Varicose Veins Of Lower Extremities] : not present [] : not present [Purpura] : no purpura  [Petechiae] : no petechiae [Skin Ulcer] : ulcer [Skin Induration] : no induration [Alert] : alert [Oriented to Person] : oriented to person [Oriented to Place] : oriented to place [Oriented to Time] : oriented to time [Calm] : calm [de-identified] : adult WM, NAD, alert, Ox 3. [de-identified] : HTN, HLD [de-identified] : Diabetic Charcot right foot deformity [de-identified] : right foot plantar ulcer down to skin, subcutaneous tissue, fat. new right lateral 5th metatarsal ulcer down to skin, subcutaneous tissue, and fat. [de-identified] : Diabetic neuropathy  [FreeTextEntry1] : Plantar Foot  [FreeTextEntry2] : 2.4 [FreeTextEntry3] : 2.3 [FreeTextEntry4] : 0.2 [de-identified] : Serous/sanguinous [de-identified] : Callus  [de-identified] : Expressed comfort post ACE application. [de-identified] : Silver Alginate [de-identified] : Cleansed with Normal saline\par  [FreeTextEntry7] : Foot Lateral 5th Metatarsal (New)  [FreeTextEntry8] : 1.9 [FreeTextEntry9] : 1.5 [de-identified] : 0..3 [de-identified] : Serous/sanguinous [de-identified] : callus  [de-identified] : Expressed comfort post ACE application. [de-identified] : Silver alginate [de-identified] : Cleansed with Normal saline\par  [TWNoteComboBox1] : Right [TWNoteComboBox4] : Moderate [de-identified] : None [de-identified] : other [de-identified] : 100% [de-identified] : None [de-identified] : No [de-identified] : Ace wraps [de-identified] : 3x Weekly [de-identified] : Primary Dressing [TWNoteComboBox9] : Right [de-identified] : Moderate [de-identified] : other [de-identified] : None [de-identified] : None [de-identified] : 100% [de-identified] : Ace wraps [de-identified] : 3x Weekly [de-identified] : Primary Dressing

## 2022-02-24 NOTE — PLAN
[FreeTextEntry1] : Patient to have orthotist re- evaluate shoe gear and off loading , patient to see Dr Lee to evaluate vascular status . Patients diabetic small vessel disease and lower extremity pathology is advancing > Discussed in detail the significance of the patient small vessel disease , patient understands . Patient also understands he is high risk for limb loss and life threatening sepsis  Spent 20 minutes for patient care and medical decision making.\par

## 2022-02-24 NOTE — HISTORY OF PRESENT ILLNESS
[FreeTextEntry1] : DFU 2 plantar base 5th metatarsal and a new DFU 2 lateral head of 5th metatarsal . Patients diabetic condition and small vessel disease are  advancing

## 2022-02-24 NOTE — REVIEW OF SYSTEMS
[Fever] : no fever [Chills] : no chills [Eye Pain] : no eye pain [Loss Of Hearing] : no hearing loss [Shortness Of Breath] : no shortness of breath [Abdominal Pain] : no abdominal pain [Vomiting] : no vomiting [Skin Lesions] : no skin lesions [Skin Wound] : no skin wound [Anxiety] : no anxiety [Easy Bleeding] : no tendency for easy bleeding [FreeTextEntry5] : HTN,HLD [FreeTextEntry7] : 40.9 BMI , morbid obesity  [FreeTextEntry8] : Kidney Transplant  [FreeTextEntry9] : Associated Diabetic Charcot foot  [de-identified] : plantar right foot midfoot , lateral  DFU 2  , large area of peripheral callus build up , the ulcer is clean and granular [de-identified] : IDDM with neuropathy  [de-identified] : MARILUZ

## 2022-02-24 NOTE — VITALS
[Pain related to present condition?] : The patient's  pain is not related to present condition. [de-identified] : 0/10 [] : No

## 2022-03-03 ENCOUNTER — APPOINTMENT (OUTPATIENT)
Dept: WOUND CARE | Facility: HOSPITAL | Age: 57
End: 2022-03-03
Payer: MEDICARE

## 2022-03-03 ENCOUNTER — OUTPATIENT (OUTPATIENT)
Dept: OUTPATIENT SERVICES | Facility: HOSPITAL | Age: 57
LOS: 1 days | Discharge: HOME | End: 2022-03-03
Payer: MEDICARE

## 2022-03-03 VITALS
DIASTOLIC BLOOD PRESSURE: 78 MMHG | BODY MASS INDEX: 39.76 KG/M2 | HEIGHT: 73 IN | SYSTOLIC BLOOD PRESSURE: 140 MMHG | HEART RATE: 55 BPM | WEIGHT: 300 LBS | TEMPERATURE: 98.2 F | RESPIRATION RATE: 20 BRPM | OXYGEN SATURATION: 97 %

## 2022-03-03 DIAGNOSIS — Z89.421 ACQUIRED ABSENCE OF OTHER RIGHT TOE(S): Chronic | ICD-10-CM

## 2022-03-03 DIAGNOSIS — E11.621 TYPE 2 DIABETES MELLITUS WITH FOOT ULCER: ICD-10-CM

## 2022-03-03 DIAGNOSIS — Z94.0 KIDNEY TRANSPLANT STATUS: Chronic | ICD-10-CM

## 2022-03-03 DIAGNOSIS — N18.6 END STAGE RENAL DISEASE: Chronic | ICD-10-CM

## 2022-03-03 PROCEDURE — 99213 OFFICE O/P EST LOW 20 MIN: CPT

## 2022-03-03 PROCEDURE — G0463: CPT

## 2022-03-03 NOTE — PHYSICAL EXAM
[4 x 4] : 4 x 4  [Abdominal Pad] : Abdominal Pad [0] : left 0 [1+] : left 1+ [Varicose Veins Of Lower Extremities] : not present [] : not present [Purpura] : no purpura  [Petechiae] : no petechiae [Skin Ulcer] : ulcer [Skin Induration] : no induration [Alert] : alert [Oriented to Person] : oriented to person [Oriented to Place] : oriented to place [Oriented to Time] : oriented to time [Calm] : calm [de-identified] : adult WM, NAD, alert, Ox 3. [de-identified] : HTN, HLD [de-identified] : Diabetic Charcot right foot deformity [de-identified] : right foot plantar ulcer down to skin, subcutaneous tissue, fat. new right lateral 5th metatarsal ulcer down to skin, subcutaneous tissue, and fat. [de-identified] : Diabetic neuropathy  [FreeTextEntry1] : Plantar Foot  [FreeTextEntry2] : 2.0 [FreeTextEntry3] : 2.0 [FreeTextEntry4] : 0.2 [de-identified] : Serous/sanguinous [de-identified] : Callus- Removed [de-identified] : Expressed comfort post ACE application. [de-identified] : Silver Alginate [de-identified] : Cleansed with Normal saline\par  [FreeTextEntry7] : Foot Lateral 5th Metatarsal (New)  [FreeTextEntry8] : 1.6 [FreeTextEntry9] : 1.3 [de-identified] : 0.3 [de-identified] : Serous/sanguinous [de-identified] : callus  [de-identified] : 40% [de-identified] : 60% [de-identified] : Expressed comfort post ACE application. [de-identified] : Silver Alginate  [de-identified] : Cleansed with Normal saline\par  [TWNoteComboBox1] : Right [TWNoteComboBox4] : Moderate [de-identified] : other [de-identified] : None [de-identified] : None [de-identified] : 100% [de-identified] : No [de-identified] : Ace wraps [de-identified] : 3x Weekly [de-identified] : Primary Dressing [TWNoteComboBox9] : Right [de-identified] : Moderate [de-identified] : other [de-identified] : None [de-identified] : None [de-identified] : 100% [de-identified] : Yes [de-identified] : Ace wraps [de-identified] : 3x Weekly [de-identified] : Primary Dressing

## 2022-03-03 NOTE — ASSESSMENT
[Verbal] : Verbal [Written] : Written [Demo] : Demo [Patient] : Patient [Good - alert, interested, motivated] : Good - alert, interested, motivated [Verbalizes knowledge/Understanding] : Verbalizes knowledge/understanding [Dressing changes] : dressing changes [Foot Care] : foot care [Skin Care] : skin care [Signs and symptoms of infection] : sign and symptoms of infection [Nutrition] : nutrition [How and When to Call] : how and when to call [Off-loading] : off-loading [Compression Therapy] : compression therapy [Patient responsibility to plan of care] : patient responsibility to plan of care [Glycemic Control] : glycemic control [Stable] : stable [Home] : Home [Ambulatory] : Ambulatory [Not Applicable - Long Term Care/Home Health Agency] : Long Term Care/Home Health Agency: Not Applicable [] : No [FreeTextEntry2] : Localized Wound Care \par Infection Prevention\par Offloading/Pressure Relief  \par Glucose Control  [FreeTextEntry4] : Pt has a Vascular Consult on 3/14/22\par Pt is going to Gianluca([facility]) to readjust Offloading boot after today's visit to [facility].\par Pt to F/U to [facility] in 1 Week

## 2022-03-03 NOTE — REVIEW OF SYSTEMS
no [Fever] : no fever [Chills] : no chills [Eye Pain] : no eye pain [Loss Of Hearing] : no hearing loss [Shortness Of Breath] : no shortness of breath [Abdominal Pain] : no abdominal pain [Vomiting] : no vomiting [Skin Lesions] : no skin lesions [Skin Wound] : no skin wound [Anxiety] : no anxiety [Easy Bleeding] : no tendency for easy bleeding [FreeTextEntry5] : HTN,HLD [FreeTextEntry7] : 40.9 BMI , morbid obesity  [FreeTextEntry8] : Kidney Transplant  [FreeTextEntry9] : Associated Diabetic Charcot foot  [de-identified] : plantar right foot midfoot , lateral  DFU 2  , large area of peripheral callus build up , the ulcer is clean and granular [de-identified] : IDDM with neuropathy  [de-identified] : MARILUZ

## 2022-03-03 NOTE — HISTORY OF PRESENT ILLNESS
[FreeTextEntry1] : DFU 2 plantar right base 5th metatarsal and lateral head of the 5th metatarsal , clean , stable - soi

## 2022-03-03 NOTE — PLAN
[FreeTextEntry1] : currently right foot ulcers are stable and clean , patient to have off loading boot adjusted to prevent irritation to the lateral 5th metatarsal head , Patient high risk for limb loss , patient may need resection of bone from the 5th metatarsal head if the ulcer doesn’t close  patient understands all the risks and benefits PTR 1 week   Spent 20 minutes for patient care and medical decision making.\par

## 2022-03-05 DIAGNOSIS — Z86.718 PERSONAL HISTORY OF OTHER VENOUS THROMBOSIS AND EMBOLISM: ICD-10-CM

## 2022-03-05 DIAGNOSIS — E66.01 MORBID (SEVERE) OBESITY DUE TO EXCESS CALORIES: ICD-10-CM

## 2022-03-05 DIAGNOSIS — Z94.0 KIDNEY TRANSPLANT STATUS: ICD-10-CM

## 2022-03-05 DIAGNOSIS — Z79.899 OTHER LONG TERM (CURRENT) DRUG THERAPY: ICD-10-CM

## 2022-03-05 DIAGNOSIS — Z86.16 PERSONAL HISTORY OF COVID-19: ICD-10-CM

## 2022-03-05 DIAGNOSIS — G47.33 OBSTRUCTIVE SLEEP APNEA (ADULT) (PEDIATRIC): ICD-10-CM

## 2022-03-05 DIAGNOSIS — E11.621 TYPE 2 DIABETES MELLITUS WITH FOOT ULCER: ICD-10-CM

## 2022-03-05 DIAGNOSIS — Z89.421 ACQUIRED ABSENCE OF OTHER RIGHT TOE(S): ICD-10-CM

## 2022-03-05 DIAGNOSIS — Z89.422 ACQUIRED ABSENCE OF OTHER LEFT TOE(S): ICD-10-CM

## 2022-03-05 DIAGNOSIS — Z79.4 LONG TERM (CURRENT) USE OF INSULIN: ICD-10-CM

## 2022-03-05 DIAGNOSIS — E78.5 HYPERLIPIDEMIA, UNSPECIFIED: ICD-10-CM

## 2022-03-05 DIAGNOSIS — Z83.3 FAMILY HISTORY OF DIABETES MELLITUS: ICD-10-CM

## 2022-03-05 DIAGNOSIS — L97.412 NON-PRESSURE CHRONIC ULCER OF RIGHT HEEL AND MIDFOOT WITH FAT LAYER EXPOSED: ICD-10-CM

## 2022-03-05 DIAGNOSIS — E11.51 TYPE 2 DIABETES MELLITUS WITH DIABETIC PERIPHERAL ANGIOPATHY WITHOUT GANGRENE: ICD-10-CM

## 2022-03-05 DIAGNOSIS — L84 CORNS AND CALLOSITIES: ICD-10-CM

## 2022-03-05 DIAGNOSIS — Z79.82 LONG TERM (CURRENT) USE OF ASPIRIN: ICD-10-CM

## 2022-03-05 DIAGNOSIS — Z80.3 FAMILY HISTORY OF MALIGNANT NEOPLASM OF BREAST: ICD-10-CM

## 2022-03-05 DIAGNOSIS — Z87.81 PERSONAL HISTORY OF (HEALED) TRAUMATIC FRACTURE: ICD-10-CM

## 2022-03-05 DIAGNOSIS — Z80.8 FAMILY HISTORY OF MALIGNANT NEOPLASM OF OTHER ORGANS OR SYSTEMS: ICD-10-CM

## 2022-03-05 DIAGNOSIS — Z86.73 PERSONAL HISTORY OF TRANSIENT ISCHEMIC ATTACK (TIA), AND CEREBRAL INFARCTION WITHOUT RESIDUAL DEFICITS: ICD-10-CM

## 2022-03-05 DIAGNOSIS — Z85.828 PERSONAL HISTORY OF OTHER MALIGNANT NEOPLASM OF SKIN: ICD-10-CM

## 2022-03-05 DIAGNOSIS — E11.610 TYPE 2 DIABETES MELLITUS WITH DIABETIC NEUROPATHIC ARTHROPATHY: ICD-10-CM

## 2022-03-09 NOTE — PHYSICAL THERAPY INITIAL EVALUATION ADULT - ONSET DATE, REHAB EVAL
11-Feb-2021 PAST SURGICAL HISTORY:  Renal calculi s/p stone extraction 57 yrs ago    S/P bladder repair Interstim device placement 2010    S/P lumpectomy of breast right breast with node removal

## 2022-03-10 ENCOUNTER — OUTPATIENT (OUTPATIENT)
Dept: OUTPATIENT SERVICES | Facility: HOSPITAL | Age: 57
LOS: 1 days | Discharge: HOME | End: 2022-03-10
Payer: MEDICARE

## 2022-03-10 ENCOUNTER — APPOINTMENT (OUTPATIENT)
Dept: WOUND CARE | Facility: HOSPITAL | Age: 57
End: 2022-03-10
Payer: MEDICARE

## 2022-03-10 VITALS
OXYGEN SATURATION: 93 % | TEMPERATURE: 97.4 F | WEIGHT: 300 LBS | HEART RATE: 76 BPM | BODY MASS INDEX: 39.76 KG/M2 | RESPIRATION RATE: 20 BRPM | SYSTOLIC BLOOD PRESSURE: 112 MMHG | DIASTOLIC BLOOD PRESSURE: 64 MMHG | HEIGHT: 73 IN

## 2022-03-10 DIAGNOSIS — Z94.0 KIDNEY TRANSPLANT STATUS: Chronic | ICD-10-CM

## 2022-03-10 DIAGNOSIS — E11.621 TYPE 2 DIABETES MELLITUS WITH FOOT ULCER: ICD-10-CM

## 2022-03-10 DIAGNOSIS — Z89.421 ACQUIRED ABSENCE OF OTHER RIGHT TOE(S): Chronic | ICD-10-CM

## 2022-03-10 DIAGNOSIS — N18.6 END STAGE RENAL DISEASE: Chronic | ICD-10-CM

## 2022-03-10 PROCEDURE — 99213 OFFICE O/P EST LOW 20 MIN: CPT

## 2022-03-10 PROCEDURE — G0463: CPT

## 2022-03-10 NOTE — PHYSICAL EXAM
[4 x 4] : 4 x 4  [Abdominal Pad] : Abdominal Pad [0] : left 0 [1+] : left 1+ [Varicose Veins Of Lower Extremities] : not present [] : not present [Purpura] : no purpura  [Petechiae] : no petechiae [Skin Ulcer] : ulcer [Skin Induration] : no induration [Alert] : alert [Oriented to Person] : oriented to person [Oriented to Place] : oriented to place [Oriented to Time] : oriented to time [Calm] : calm [de-identified] : adult WM, NAD, alert, Ox 3. [de-identified] : HTN, HLD [de-identified] : Diabetic Charcot right foot deformity [de-identified] : right foot plantar ulcer down to skin, subcutaneous tissue, fat. new right lateral 5th metatarsal ulcer down to skin, subcutaneous tissue, and fat. [de-identified] : Diabetic neuropathy  [FreeTextEntry1] : Right Plantar Foot  [FreeTextEntry2] : 2 [FreeTextEntry3] : 2 [FreeTextEntry4] : 0.2 [de-identified] : Callus [de-identified] : sanguinous [de-identified] : Silver Alginate [de-identified] : Cleansed with Normal saline\par Kerlix \par \par * Callus shaved by DPM \par  [FreeTextEntry7] : Right Foot Lateral 5th Metatarsal  [FreeTextEntry9] : 1.3 [FreeTextEntry8] : 1.4 [de-identified] : 0.3 [de-identified] : Serous/sanguinous [de-identified] : callus  [de-identified] : 50%  [de-identified] : Cleansed with Normal saline\par Kerlix \par \par * Callus shaved by DPM  [de-identified] : Silver Alginate  [TWNoteComboBox4] : Moderate [de-identified] : other [de-identified] : None [de-identified] : None [de-identified] : 100% [de-identified] : No [de-identified] : Every other day [de-identified] : Primary Dressing [de-identified] : Moderate [de-identified] : other [de-identified] : None [de-identified] : None [de-identified] : 50% [de-identified] : Yes [de-identified] : Every other day [de-identified] : Primary Dressing

## 2022-03-10 NOTE — REVIEW OF SYSTEMS
[Fever] : no fever [Chills] : no chills [Eye Pain] : no eye pain [Loss Of Hearing] : no hearing loss [Shortness Of Breath] : no shortness of breath [Abdominal Pain] : no abdominal pain [Vomiting] : no vomiting [Skin Lesions] : no skin lesions [Skin Wound] : no skin wound [Anxiety] : no anxiety [Easy Bleeding] : no tendency for easy bleeding [FreeTextEntry5] : HTN,HLD [FreeTextEntry7] : 40.9 BMI , morbid obesity  [FreeTextEntry8] : Kidney Transplant  [FreeTextEntry9] : Associated Diabetic Charcot foot  [de-identified] : plantar right foot midfoot , lateral  DFU 2  , large area of peripheral callus build up , the ulcer is clean and granular [de-identified] : IDDM with neuropathy  [de-identified] : MARILUZ

## 2022-03-10 NOTE — HISTORY OF PRESENT ILLNESS
[FreeTextEntry1] : DFU 3 lateral head 5th metatarsal right foot , SSFF and DFU2 plantar base 5th metatarsal right , clean granular

## 2022-03-10 NOTE — PLAN
[FreeTextEntry1] : continue off loading and wound care , 5th metatarsal head may need resection .currently no soi , and the ulcers are smaller  Spent 20 minutes for patient care and medical decision making.\par

## 2022-03-10 NOTE — ASSESSMENT
[Verbal] : Verbal [Written] : Written [Demo] : Demo [Patient] : Patient [Good - alert, interested, motivated] : Good - alert, interested, motivated [Verbalizes knowledge/Understanding] : Verbalizes knowledge/understanding [Dressing changes] : dressing changes [Foot Care] : foot care [Skin Care] : skin care [Signs and symptoms of infection] : sign and symptoms of infection [Nutrition] : nutrition [How and When to Call] : how and when to call [Pain Management] : pain management [Off-loading] : off-loading [Patient responsibility to plan of care] : patient responsibility to plan of care [Glycemic Control] : glycemic control [] : Yes [Stable] : stable [Home] : Home [Ambulatory] : Ambulatory [Not Applicable - Long Term Care/Home Health Agency] : Long Term Care/Home Health Agency: Not Applicable [FreeTextEntry2] : Infection prevention\par Localized wound care \par Goal remaining pain free regarding wounds\par Offloading  [FreeTextEntry4] : Pt met with Gianluca from [facility] to alter patients offloading boot 3/14/22\par Pt following up with Dr. Rizzo \par Follow up in 1 week

## 2022-03-11 DIAGNOSIS — G47.33 OBSTRUCTIVE SLEEP APNEA (ADULT) (PEDIATRIC): ICD-10-CM

## 2022-03-11 DIAGNOSIS — Z89.422 ACQUIRED ABSENCE OF OTHER LEFT TOE(S): ICD-10-CM

## 2022-03-11 DIAGNOSIS — Z94.0 KIDNEY TRANSPLANT STATUS: ICD-10-CM

## 2022-03-11 DIAGNOSIS — E78.5 HYPERLIPIDEMIA, UNSPECIFIED: ICD-10-CM

## 2022-03-11 DIAGNOSIS — Z79.82 LONG TERM (CURRENT) USE OF ASPIRIN: ICD-10-CM

## 2022-03-11 DIAGNOSIS — L84 CORNS AND CALLOSITIES: ICD-10-CM

## 2022-03-11 DIAGNOSIS — E11.51 TYPE 2 DIABETES MELLITUS WITH DIABETIC PERIPHERAL ANGIOPATHY WITHOUT GANGRENE: ICD-10-CM

## 2022-03-11 DIAGNOSIS — E66.01 MORBID (SEVERE) OBESITY DUE TO EXCESS CALORIES: ICD-10-CM

## 2022-03-11 DIAGNOSIS — Z79.899 OTHER LONG TERM (CURRENT) DRUG THERAPY: ICD-10-CM

## 2022-03-11 DIAGNOSIS — Z89.421 ACQUIRED ABSENCE OF OTHER RIGHT TOE(S): ICD-10-CM

## 2022-03-11 DIAGNOSIS — Z83.3 FAMILY HISTORY OF DIABETES MELLITUS: ICD-10-CM

## 2022-03-11 DIAGNOSIS — E11.610 TYPE 2 DIABETES MELLITUS WITH DIABETIC NEUROPATHIC ARTHROPATHY: ICD-10-CM

## 2022-03-11 DIAGNOSIS — Z86.73 PERSONAL HISTORY OF TRANSIENT ISCHEMIC ATTACK (TIA), AND CEREBRAL INFARCTION WITHOUT RESIDUAL DEFICITS: ICD-10-CM

## 2022-03-11 DIAGNOSIS — L97.412 NON-PRESSURE CHRONIC ULCER OF RIGHT HEEL AND MIDFOOT WITH FAT LAYER EXPOSED: ICD-10-CM

## 2022-03-11 DIAGNOSIS — Z79.4 LONG TERM (CURRENT) USE OF INSULIN: ICD-10-CM

## 2022-03-11 DIAGNOSIS — Z87.81 PERSONAL HISTORY OF (HEALED) TRAUMATIC FRACTURE: ICD-10-CM

## 2022-03-11 DIAGNOSIS — Z85.828 PERSONAL HISTORY OF OTHER MALIGNANT NEOPLASM OF SKIN: ICD-10-CM

## 2022-03-11 DIAGNOSIS — Z86.16 PERSONAL HISTORY OF COVID-19: ICD-10-CM

## 2022-03-11 DIAGNOSIS — Z80.3 FAMILY HISTORY OF MALIGNANT NEOPLASM OF BREAST: ICD-10-CM

## 2022-03-11 DIAGNOSIS — Z80.8 FAMILY HISTORY OF MALIGNANT NEOPLASM OF OTHER ORGANS OR SYSTEMS: ICD-10-CM

## 2022-03-11 DIAGNOSIS — Z86.718 PERSONAL HISTORY OF OTHER VENOUS THROMBOSIS AND EMBOLISM: ICD-10-CM

## 2022-03-11 DIAGNOSIS — E11.621 TYPE 2 DIABETES MELLITUS WITH FOOT ULCER: ICD-10-CM

## 2022-03-14 ENCOUNTER — OUTPATIENT (OUTPATIENT)
Dept: OUTPATIENT SERVICES | Facility: HOSPITAL | Age: 57
LOS: 1 days | Discharge: HOME | End: 2022-03-14
Payer: MEDICARE

## 2022-03-14 ENCOUNTER — APPOINTMENT (OUTPATIENT)
Dept: WOUND CARE | Facility: HOSPITAL | Age: 57
End: 2022-03-14
Payer: MEDICARE

## 2022-03-14 VITALS
OXYGEN SATURATION: 96 % | DIASTOLIC BLOOD PRESSURE: 62 MMHG | HEART RATE: 62 BPM | RESPIRATION RATE: 16 BRPM | SYSTOLIC BLOOD PRESSURE: 117 MMHG | TEMPERATURE: 97.6 F

## 2022-03-14 DIAGNOSIS — E11.621 TYPE 2 DIABETES MELLITUS WITH FOOT ULCER: ICD-10-CM

## 2022-03-14 DIAGNOSIS — L97.519 TYPE 2 DIABETES MELLITUS WITH FOOT ULCER: ICD-10-CM

## 2022-03-14 DIAGNOSIS — L84 CORNS AND CALLOSITIES: ICD-10-CM

## 2022-03-14 DIAGNOSIS — Z89.421 ACQUIRED ABSENCE OF OTHER RIGHT TOE(S): Chronic | ICD-10-CM

## 2022-03-14 DIAGNOSIS — L97.412 NON-PRESSURE CHRONIC ULCER OF RIGHT HEEL AND MIDFOOT WITH FAT LAYER EXPOSED: ICD-10-CM

## 2022-03-14 DIAGNOSIS — N18.6 END STAGE RENAL DISEASE: Chronic | ICD-10-CM

## 2022-03-14 DIAGNOSIS — Z94.0 KIDNEY TRANSPLANT STATUS: Chronic | ICD-10-CM

## 2022-03-14 PROCEDURE — G0463: CPT

## 2022-03-14 PROCEDURE — 99213 OFFICE O/P EST LOW 20 MIN: CPT

## 2022-03-14 NOTE — HISTORY OF PRESENT ILLNESS
[FreeTextEntry1] : Kai is known to me from last Novembers [facility]ji for R foot DFU. He is here due to development of plantar ulcer after wearing a boot to prevent pressure on another lateral foot ulcer. He was also recently admitted to [facility] with Cholcystitis?\par His last AIC was > 9. He is seeing his endocrinologist next month.  [de-identified] : As above

## 2022-03-14 NOTE — ASSESSMENT
[FreeTextEntry1] : Kai has no evidence of PVD. He has palpable pulses. Last years ABIs are normal. He does have uncontrolled  DM. May have been precipitated by recent infection-fish.

## 2022-03-14 NOTE — PHYSICAL EXAM
[2+] : right 2+ [FreeTextEntry1] : R DP palpable , PT palpable, strong plantar signal [de-identified] : R plantar and lateral foot full thickness wound, pink base

## 2022-03-15 DIAGNOSIS — Z98.890 OTHER SPECIFIED POSTPROCEDURAL STATES: ICD-10-CM

## 2022-03-15 DIAGNOSIS — E11.40 TYPE 2 DIABETES MELLITUS WITH DIABETIC NEUROPATHY, UNSPECIFIED: ICD-10-CM

## 2022-03-15 DIAGNOSIS — Z94.0 KIDNEY TRANSPLANT STATUS: ICD-10-CM

## 2022-03-15 DIAGNOSIS — Z79.899 OTHER LONG TERM (CURRENT) DRUG THERAPY: ICD-10-CM

## 2022-03-15 DIAGNOSIS — Z87.81 PERSONAL HISTORY OF (HEALED) TRAUMATIC FRACTURE: ICD-10-CM

## 2022-03-15 DIAGNOSIS — E78.5 HYPERLIPIDEMIA, UNSPECIFIED: ICD-10-CM

## 2022-03-15 DIAGNOSIS — Z80.8 FAMILY HISTORY OF MALIGNANT NEOPLASM OF OTHER ORGANS OR SYSTEMS: ICD-10-CM

## 2022-03-15 DIAGNOSIS — E11.621 TYPE 2 DIABETES MELLITUS WITH FOOT ULCER: ICD-10-CM

## 2022-03-15 DIAGNOSIS — Z86.718 PERSONAL HISTORY OF OTHER VENOUS THROMBOSIS AND EMBOLISM: ICD-10-CM

## 2022-03-15 DIAGNOSIS — Z83.3 FAMILY HISTORY OF DIABETES MELLITUS: ICD-10-CM

## 2022-03-15 DIAGNOSIS — Z86.16 PERSONAL HISTORY OF COVID-19: ICD-10-CM

## 2022-03-15 DIAGNOSIS — Z80.3 FAMILY HISTORY OF MALIGNANT NEOPLASM OF BREAST: ICD-10-CM

## 2022-03-15 DIAGNOSIS — G47.33 OBSTRUCTIVE SLEEP APNEA (ADULT) (PEDIATRIC): ICD-10-CM

## 2022-03-15 DIAGNOSIS — Z79.4 LONG TERM (CURRENT) USE OF INSULIN: ICD-10-CM

## 2022-03-15 DIAGNOSIS — L97.412 NON-PRESSURE CHRONIC ULCER OF RIGHT HEEL AND MIDFOOT WITH FAT LAYER EXPOSED: ICD-10-CM

## 2022-03-15 DIAGNOSIS — Z79.82 LONG TERM (CURRENT) USE OF ASPIRIN: ICD-10-CM

## 2022-03-15 DIAGNOSIS — L84 CORNS AND CALLOSITIES: ICD-10-CM

## 2022-03-15 DIAGNOSIS — Z85.828 PERSONAL HISTORY OF OTHER MALIGNANT NEOPLASM OF SKIN: ICD-10-CM

## 2022-03-16 ENCOUNTER — OUTPATIENT (OUTPATIENT)
Dept: OUTPATIENT SERVICES | Facility: HOSPITAL | Age: 57
LOS: 1 days | Discharge: HOME | End: 2022-03-16
Payer: MEDICARE

## 2022-03-16 ENCOUNTER — APPOINTMENT (OUTPATIENT)
Dept: WOUND CARE | Facility: HOSPITAL | Age: 57
End: 2022-03-16
Payer: MEDICARE

## 2022-03-16 ENCOUNTER — RESULT REVIEW (OUTPATIENT)
Age: 57
End: 2022-03-16

## 2022-03-16 VITALS
HEART RATE: 64 BPM | SYSTOLIC BLOOD PRESSURE: 158 MMHG | DIASTOLIC BLOOD PRESSURE: 84 MMHG | OXYGEN SATURATION: 99 % | RESPIRATION RATE: 18 BRPM | TEMPERATURE: 97.6 F

## 2022-03-16 DIAGNOSIS — Z94.0 KIDNEY TRANSPLANT STATUS: Chronic | ICD-10-CM

## 2022-03-16 DIAGNOSIS — Z89.421 ACQUIRED ABSENCE OF OTHER RIGHT TOE(S): Chronic | ICD-10-CM

## 2022-03-16 DIAGNOSIS — N18.6 END STAGE RENAL DISEASE: Chronic | ICD-10-CM

## 2022-03-16 DIAGNOSIS — E11.621 TYPE 2 DIABETES MELLITUS WITH FOOT ULCER: ICD-10-CM

## 2022-03-16 PROCEDURE — 73630 X-RAY EXAM OF FOOT: CPT | Mod: 26,50

## 2022-03-16 PROCEDURE — G0463: CPT

## 2022-03-16 PROCEDURE — 99213 OFFICE O/P EST LOW 20 MIN: CPT

## 2022-03-16 PROCEDURE — 73630 X-RAY EXAM OF FOOT: CPT

## 2022-03-16 NOTE — HISTORY OF PRESENT ILLNESS
[FreeTextEntry1] : DFU 2 plantar right base 5th metatarsal and DFU 3 lateral right 5th metatarsal head , -soi

## 2022-03-16 NOTE — REVIEW OF SYSTEMS
[Fever] : no fever [Chills] : no chills [Eye Pain] : no eye pain [Loss Of Hearing] : no hearing loss [Shortness Of Breath] : no shortness of breath [Abdominal Pain] : no abdominal pain [Vomiting] : no vomiting [Skin Lesions] : no skin lesions [Skin Wound] : no skin wound [Anxiety] : no anxiety [Easy Bleeding] : no tendency for easy bleeding [FreeTextEntry5] : HTN,HLD [FreeTextEntry7] : 40.9 BMI , morbid obesity  [FreeTextEntry8] : Kidney Transplant  [FreeTextEntry9] : Associated Diabetic Charcot foot  [de-identified] : plantar right foot midfoot , lateral  DFU 2  , large area of peripheral callus build up , the ulcer is clean and granular [de-identified] : IDDM with neuropathy  [de-identified] : MARILUZ

## 2022-03-16 NOTE — ASSESSMENT
[Verbal] : Verbal [Written] : Written [Demo] : Demo [Patient] : Patient [Good - alert, interested, motivated] : Good - alert, interested, motivated [Verbalizes knowledge/Understanding] : Verbalizes knowledge/understanding [Dressing changes] : dressing changes [Foot Care] : foot care [Skin Care] : skin care [Signs and symptoms of infection] : sign and symptoms of infection [Nutrition] : nutrition [How and When to Call] : how and when to call [Pain Management] : pain management [Off-loading] : off-loading [Patient responsibility to plan of care] : patient responsibility to plan of care [Glycemic Control] : glycemic control [Stable] : stable [Home] : Home [Ambulatory] : Ambulatory [Not Applicable - Long Term Care/Home Health Agency] : Long Term Care/Home Health Agency: Not Applicable [] : No [FreeTextEntry2] : Infection prevention \par Wound care (dressing changes)\par Maintain optimal skin integrity to high pressure areas\par Compression therapy\par Elevation and low sodium compliance.\par Pressure relief/ Pressure redistribution\par Weight reduction strategies \par Glycemic control\par Xray [FreeTextEntry4] : Pt seen by vascular MD on Monday, 3/14/22\par Xray ordered. Pt to complete today. Pt provided rx for completion of same.\par Follow up in 1 week (3/25/22)

## 2022-03-16 NOTE — PHYSICAL EXAM
[4 x 4] : 4 x 4  [Abdominal Pad] : Abdominal Pad [0] : left 0 [1+] : left 1+ [Varicose Veins Of Lower Extremities] : not present [] : not present [Purpura] : no purpura  [Petechiae] : no petechiae [Skin Ulcer] : ulcer [Skin Induration] : no induration [Alert] : alert [Oriented to Person] : oriented to person [Oriented to Place] : oriented to place [Oriented to Time] : oriented to time [Calm] : calm [de-identified] : adult WM, NAD, alert, Ox 3. [de-identified] : HTN, HLD [de-identified] : Diabetic Charcot right foot deformity [de-identified] : Diabetic neuropathy  [de-identified] : right foot plantar ulcer down to skin, subcutaneous tissue, fat. new right lateral 5th metatarsal ulcer down to skin, subcutaneous tissue, and fat. [FreeTextEntry1] : Right Plantar Foot  [FreeTextEntry2] : 2.2 [FreeTextEntry3] : 2.2 [FreeTextEntry4] : 0.2 [de-identified] : sanguinous [de-identified] : Callus [de-identified] : Silver Alginate [de-identified] : Cleansed with Normal saline\par Kerlix \par \par * Callus shaved by DPM \par  [de-identified] : DPM shaved callus & cauterized with silver nitrate stick [FreeTextEntry7] : Right Foot Lateral 5th Metatarsal  [FreeTextEntry8] : 1.5 [FreeTextEntry9] : 1.5 [de-identified] : 0.3 [de-identified] : serosanguineous [de-identified] : callus  [de-identified] : 60% [de-identified] : 40% [de-identified] : Silver Alginate  [de-identified] : Cleansed with Normal saline\par Kerlix \par \par * Callus shaved by DPM  [TWNoteComboBox4] : Moderate [de-identified] : other [de-identified] : None [de-identified] : None [de-identified] : 100% [de-identified] : No [de-identified] : Every other day [de-identified] : Primary Dressing [de-identified] : Moderate [de-identified] : other [de-identified] : None [de-identified] : None [de-identified] : 50% [de-identified] : Yes [de-identified] : Every other day [de-identified] : Primary Dressing

## 2022-03-16 NOTE — PLAN
[FreeTextEntry1] : Patient sent for x rays ,if wound on the 5th metatarsal doesn’t close in the next few weeks patient may benefit from a met head resection ., patient has multiple co morbid diabetic factors and is high risk for limb loss and life threatening sepsis  Spent 20 minutes for patient care and medical decision making.\par

## 2022-03-17 DIAGNOSIS — Z80.8 FAMILY HISTORY OF MALIGNANT NEOPLASM OF OTHER ORGANS OR SYSTEMS: ICD-10-CM

## 2022-03-17 DIAGNOSIS — L84 CORNS AND CALLOSITIES: ICD-10-CM

## 2022-03-17 DIAGNOSIS — Z80.3 FAMILY HISTORY OF MALIGNANT NEOPLASM OF BREAST: ICD-10-CM

## 2022-03-17 DIAGNOSIS — Z85.828 PERSONAL HISTORY OF OTHER MALIGNANT NEOPLASM OF SKIN: ICD-10-CM

## 2022-03-17 DIAGNOSIS — E11.610 TYPE 2 DIABETES MELLITUS WITH DIABETIC NEUROPATHIC ARTHROPATHY: ICD-10-CM

## 2022-03-17 DIAGNOSIS — Z87.81 PERSONAL HISTORY OF (HEALED) TRAUMATIC FRACTURE: ICD-10-CM

## 2022-03-17 DIAGNOSIS — Z89.421 ACQUIRED ABSENCE OF OTHER RIGHT TOE(S): ICD-10-CM

## 2022-03-17 DIAGNOSIS — Z83.3 FAMILY HISTORY OF DIABETES MELLITUS: ICD-10-CM

## 2022-03-17 DIAGNOSIS — Z86.718 PERSONAL HISTORY OF OTHER VENOUS THROMBOSIS AND EMBOLISM: ICD-10-CM

## 2022-03-17 DIAGNOSIS — E11.621 TYPE 2 DIABETES MELLITUS WITH FOOT ULCER: ICD-10-CM

## 2022-03-17 DIAGNOSIS — E78.5 HYPERLIPIDEMIA, UNSPECIFIED: ICD-10-CM

## 2022-03-17 DIAGNOSIS — G47.33 OBSTRUCTIVE SLEEP APNEA (ADULT) (PEDIATRIC): ICD-10-CM

## 2022-03-17 DIAGNOSIS — Z79.899 OTHER LONG TERM (CURRENT) DRUG THERAPY: ICD-10-CM

## 2022-03-17 DIAGNOSIS — Z86.73 PERSONAL HISTORY OF TRANSIENT ISCHEMIC ATTACK (TIA), AND CEREBRAL INFARCTION WITHOUT RESIDUAL DEFICITS: ICD-10-CM

## 2022-03-17 DIAGNOSIS — Z79.4 LONG TERM (CURRENT) USE OF INSULIN: ICD-10-CM

## 2022-03-17 DIAGNOSIS — Z86.16 PERSONAL HISTORY OF COVID-19: ICD-10-CM

## 2022-03-17 DIAGNOSIS — Z89.422 ACQUIRED ABSENCE OF OTHER LEFT TOE(S): ICD-10-CM

## 2022-03-17 DIAGNOSIS — L97.412 NON-PRESSURE CHRONIC ULCER OF RIGHT HEEL AND MIDFOOT WITH FAT LAYER EXPOSED: ICD-10-CM

## 2022-03-17 DIAGNOSIS — E11.51 TYPE 2 DIABETES MELLITUS WITH DIABETIC PERIPHERAL ANGIOPATHY WITHOUT GANGRENE: ICD-10-CM

## 2022-03-17 DIAGNOSIS — Z94.0 KIDNEY TRANSPLANT STATUS: ICD-10-CM

## 2022-03-17 DIAGNOSIS — Z79.82 LONG TERM (CURRENT) USE OF ASPIRIN: ICD-10-CM

## 2022-03-17 NOTE — ED PROVIDER NOTE - CONSTITUTIONAL NEGATIVE STATEMENT, MLM
TRANSFER - IN REPORT:    Verbal report received from LISSETTE [employer] (name) on Tigist Gabriel  being received from ER(unit) for ordered procedure      Report consisted of patients Situation, Background, Assessment and   Recommendations(SBAR). Information from the following report(s) SBAR, Kardex, Intake/Output and MAR was reviewed with the receiving nurse. Opportunity for questions and clarification was provided. Assessment completed upon patients arrival to unit and care assumed.      Mahendra Hunter RN no fever and no chills.

## 2022-03-24 ENCOUNTER — NON-APPOINTMENT (OUTPATIENT)
Age: 57
End: 2022-03-24

## 2022-03-25 ENCOUNTER — OUTPATIENT (OUTPATIENT)
Dept: OUTPATIENT SERVICES | Facility: HOSPITAL | Age: 57
LOS: 1 days | Discharge: HOME | End: 2022-03-25
Payer: MEDICARE

## 2022-03-25 ENCOUNTER — APPOINTMENT (OUTPATIENT)
Dept: WOUND CARE | Facility: HOSPITAL | Age: 57
End: 2022-03-25
Payer: MEDICARE

## 2022-03-25 VITALS
OXYGEN SATURATION: 95 % | SYSTOLIC BLOOD PRESSURE: 165 MMHG | WEIGHT: 300 LBS | RESPIRATION RATE: 20 BRPM | HEIGHT: 73 IN | TEMPERATURE: 98 F | BODY MASS INDEX: 39.76 KG/M2 | HEART RATE: 62 BPM | DIASTOLIC BLOOD PRESSURE: 75 MMHG

## 2022-03-25 DIAGNOSIS — E11.621 TYPE 2 DIABETES MELLITUS WITH FOOT ULCER: ICD-10-CM

## 2022-03-25 DIAGNOSIS — N18.6 END STAGE RENAL DISEASE: Chronic | ICD-10-CM

## 2022-03-25 DIAGNOSIS — Z94.0 KIDNEY TRANSPLANT STATUS: Chronic | ICD-10-CM

## 2022-03-25 DIAGNOSIS — Z89.421 ACQUIRED ABSENCE OF OTHER RIGHT TOE(S): Chronic | ICD-10-CM

## 2022-03-25 PROCEDURE — 99213 OFFICE O/P EST LOW 20 MIN: CPT

## 2022-03-25 PROCEDURE — G0463: CPT

## 2022-03-25 NOTE — PLAN
[FreeTextEntry1] : continue wound care and off loading , patient to have bone scan to r/o OM . Ptr 1 week   Spent 20 minutes for patient care and medical decision making.\par

## 2022-03-25 NOTE — HISTORY OF PRESENT ILLNESS
[FreeTextEntry1] : DFU 3 lateral head right 5th metatarsal and dfu 2 plantar base right 5th metatarsal , clean , granular

## 2022-03-25 NOTE — ASSESSMENT
[Verbal] : Verbal [Demo] : Demo [Patient] : Patient [Spouse] : Spouse [Good - alert, interested, motivated] : Good - alert, interested, motivated [Verbalizes knowledge/Understanding] : Verbalizes knowledge/understanding [Dressing changes] : dressing changes [Foot Care] : foot care [Skin Care] : skin care [Pressure relief] : pressure relief [Signs and symptoms of infection] : sign and symptoms of infection [Nutrition] : nutrition [How and When to Call] : how and when to call [Off-loading] : off-loading [Home Health] : home health [Patient responsibility to plan of care] : patient responsibility to plan of care [Glycemic Control] : glycemic control [] : Yes [Stable] : stable [Home] : Home [Ambulatory] : Ambulatory [Not Applicable - Long Term Care/Home Health Agency] : Long Term Care/Home Health Agency: Not Applicable [FreeTextEntry2] : Infection Prevention\par Promote Skin Integrity\par Encourage Glycemic Control\par Pressure relief/offloading\par Check feet daily for changes to skin integrity\par \par  [FreeTextEntry4] : F/U 1 week for assessment\par Xray results reviewed by DPM and discussed with pt\par DPM ordered bone scan, auth submitted, pt provided with script\par Pt to see endocrinologist regarding high A1C levels next month\par Pt stated he was going to home after appointment and was advised to elevate lower extremity when home, maintain pressure off his right foot, and that a small amount of strikethrough drainage would be normal. Pt verbalized understanding and agreed to the above stated.

## 2022-03-25 NOTE — REVIEW OF SYSTEMS
[Fever] : no fever [Chills] : no chills [Eye Pain] : no eye pain [Loss Of Hearing] : no hearing loss [Shortness Of Breath] : no shortness of breath [Abdominal Pain] : no abdominal pain [Vomiting] : no vomiting [Skin Lesions] : no skin lesions [Skin Wound] : no skin wound [Anxiety] : no anxiety [Easy Bleeding] : no tendency for easy bleeding [FreeTextEntry5] : HTN,HLD [FreeTextEntry7] : 40.9 BMI , morbid obesity  [FreeTextEntry8] : Kidney Transplant  [FreeTextEntry9] : Associated Diabetic Charcot foot  [de-identified] : plantar right foot midfoot , lateral  DFU 2  , large area of peripheral callus build up , the ulcer is clean and granular [de-identified] : IDDM with neuropathy  [de-identified] : MARILUZ

## 2022-03-25 NOTE — PHYSICAL EXAM
[4 x 4] : 4 x 4  [Abdominal Pad] : Abdominal Pad [0] : left 0 [1+] : left 1+ [Varicose Veins Of Lower Extremities] : not present [] : not present [Purpura] : no purpura  [Petechiae] : no petechiae [Skin Ulcer] : ulcer [Skin Induration] : no induration [Alert] : alert [Oriented to Person] : oriented to person [Oriented to Place] : oriented to place [Oriented to Time] : oriented to time [Calm] : calm [de-identified] : adult WM, NAD, alert, Ox 3. [de-identified] : HTN, HLD [de-identified] : Diabetic Charcot right foot deformity [de-identified] : right foot plantar ulcer down to skin, subcutaneous tissue, fat. new right lateral 5th metatarsal ulcer down to skin, subcutaneous tissue, and fat. [de-identified] : Diabetic neuropathy  [de-identified] : small amount of blood loss  post callus removal, hemostasis achieved [FreeTextEntry1] : Right Plantar Foot [FreeTextEntry2] : 2.1 [FreeTextEntry3] : 2.0 [FreeTextEntry4] : 0.3-0.4 [de-identified] : serous/sanguineous [de-identified] : callus- shaved by Dr. Araiza [de-identified] : silver alginate [de-identified] : Mechanically cleansed with Sterile gauze and 0.9% Normal Saline\par \par Kerlix [de-identified] : small amount of blood loss  post callus removal, hemostasis achieved [FreeTextEntry7] : Right Lateral Foot 5th met [FreeTextEntry8] : 1.3 [FreeTextEntry9] : 1.3 [de-identified] : 0.2-0.3 [de-identified] : serous/sanguineous [de-identified] : callus- shaved by Dr. Araiza [de-identified] : silver alginate [de-identified] : Mechanically cleansed with Sterile gauze and 0.9% Normal Saline\par \par Kerlix [TWNoteComboBox4] : Small [TWNoteComboBox5] : No [TWNoteComboBox6] : Diabetic [de-identified] : No [de-identified] : other [de-identified] : None [de-identified] : None [de-identified] : 100% [de-identified] : No [de-identified] : Every other day [de-identified] : Primary Dressing [de-identified] : Small [de-identified] : No [de-identified] : Diabetic [de-identified] : No [de-identified] : other [de-identified] : None [de-identified] : None [de-identified] : 100% [de-identified] : No [de-identified] : Primary Dressing [de-identified] : Every other day

## 2022-03-28 DIAGNOSIS — E78.5 HYPERLIPIDEMIA, UNSPECIFIED: ICD-10-CM

## 2022-03-28 DIAGNOSIS — L84 CORNS AND CALLOSITIES: ICD-10-CM

## 2022-03-28 DIAGNOSIS — Z87.81 PERSONAL HISTORY OF (HEALED) TRAUMATIC FRACTURE: ICD-10-CM

## 2022-03-28 DIAGNOSIS — Z86.16 PERSONAL HISTORY OF COVID-19: ICD-10-CM

## 2022-03-28 DIAGNOSIS — Z79.82 LONG TERM (CURRENT) USE OF ASPIRIN: ICD-10-CM

## 2022-03-28 DIAGNOSIS — Z80.3 FAMILY HISTORY OF MALIGNANT NEOPLASM OF BREAST: ICD-10-CM

## 2022-03-28 DIAGNOSIS — Z89.421 ACQUIRED ABSENCE OF OTHER RIGHT TOE(S): ICD-10-CM

## 2022-03-28 DIAGNOSIS — Z83.3 FAMILY HISTORY OF DIABETES MELLITUS: ICD-10-CM

## 2022-03-28 DIAGNOSIS — Z80.8 FAMILY HISTORY OF MALIGNANT NEOPLASM OF OTHER ORGANS OR SYSTEMS: ICD-10-CM

## 2022-03-28 DIAGNOSIS — G47.33 OBSTRUCTIVE SLEEP APNEA (ADULT) (PEDIATRIC): ICD-10-CM

## 2022-03-28 DIAGNOSIS — Z85.828 PERSONAL HISTORY OF OTHER MALIGNANT NEOPLASM OF SKIN: ICD-10-CM

## 2022-03-28 DIAGNOSIS — Z86.73 PERSONAL HISTORY OF TRANSIENT ISCHEMIC ATTACK (TIA), AND CEREBRAL INFARCTION WITHOUT RESIDUAL DEFICITS: ICD-10-CM

## 2022-03-28 DIAGNOSIS — L97.412 NON-PRESSURE CHRONIC ULCER OF RIGHT HEEL AND MIDFOOT WITH FAT LAYER EXPOSED: ICD-10-CM

## 2022-03-28 DIAGNOSIS — Z94.0 KIDNEY TRANSPLANT STATUS: ICD-10-CM

## 2022-03-28 DIAGNOSIS — E11.51 TYPE 2 DIABETES MELLITUS WITH DIABETIC PERIPHERAL ANGIOPATHY WITHOUT GANGRENE: ICD-10-CM

## 2022-03-28 DIAGNOSIS — E11.621 TYPE 2 DIABETES MELLITUS WITH FOOT ULCER: ICD-10-CM

## 2022-03-28 DIAGNOSIS — E11.610 TYPE 2 DIABETES MELLITUS WITH DIABETIC NEUROPATHIC ARTHROPATHY: ICD-10-CM

## 2022-03-28 DIAGNOSIS — Z79.899 OTHER LONG TERM (CURRENT) DRUG THERAPY: ICD-10-CM

## 2022-03-28 DIAGNOSIS — Z89.422 ACQUIRED ABSENCE OF OTHER LEFT TOE(S): ICD-10-CM

## 2022-03-28 DIAGNOSIS — Z79.4 LONG TERM (CURRENT) USE OF INSULIN: ICD-10-CM

## 2022-03-28 DIAGNOSIS — Z86.718 PERSONAL HISTORY OF OTHER VENOUS THROMBOSIS AND EMBOLISM: ICD-10-CM

## 2022-04-01 ENCOUNTER — APPOINTMENT (OUTPATIENT)
Dept: WOUND CARE | Facility: HOSPITAL | Age: 57
End: 2022-04-01
Payer: MEDICARE

## 2022-04-01 ENCOUNTER — OUTPATIENT (OUTPATIENT)
Dept: OUTPATIENT SERVICES | Facility: HOSPITAL | Age: 57
LOS: 1 days | Discharge: HOME | End: 2022-04-01
Payer: MEDICARE

## 2022-04-01 VITALS
DIASTOLIC BLOOD PRESSURE: 79 MMHG | TEMPERATURE: 98.3 F | WEIGHT: 300 LBS | OXYGEN SATURATION: 95 % | HEIGHT: 73 IN | HEART RATE: 62 BPM | RESPIRATION RATE: 20 BRPM | SYSTOLIC BLOOD PRESSURE: 126 MMHG | BODY MASS INDEX: 39.76 KG/M2

## 2022-04-01 DIAGNOSIS — Z89.421 ACQUIRED ABSENCE OF OTHER RIGHT TOE(S): Chronic | ICD-10-CM

## 2022-04-01 DIAGNOSIS — E11.621 TYPE 2 DIABETES MELLITUS WITH FOOT ULCER: ICD-10-CM

## 2022-04-01 DIAGNOSIS — Z94.0 KIDNEY TRANSPLANT STATUS: Chronic | ICD-10-CM

## 2022-04-01 DIAGNOSIS — N18.6 END STAGE RENAL DISEASE: Chronic | ICD-10-CM

## 2022-04-01 PROCEDURE — 99213 OFFICE O/P EST LOW 20 MIN: CPT

## 2022-04-01 PROCEDURE — G0463: CPT

## 2022-04-02 NOTE — REVIEW OF SYSTEMS
[Fever] : no fever [Chills] : no chills [Eye Pain] : no eye pain [Shortness Of Breath] : no shortness of breath [Loss Of Hearing] : no hearing loss [Abdominal Pain] : no abdominal pain [Vomiting] : no vomiting [Skin Lesions] : no skin lesions [Skin Wound] : no skin wound [Anxiety] : no anxiety [Easy Bleeding] : no tendency for easy bleeding [FreeTextEntry5] : HTN,HLD [FreeTextEntry7] : 40.9 BMI , morbid obesity  [FreeTextEntry8] : Kidney Transplant  [FreeTextEntry9] : Associated Diabetic Charcot foot  [de-identified] : plantar right foot midfoot , lateral  DFU 2  , large area of peripheral callus build up , the ulcer is clean and granular [de-identified] : IDDM with neuropathy  [de-identified] : MARILUZ

## 2022-04-02 NOTE — PHYSICAL EXAM
[4 x 4] : 4 x 4  [Abdominal Pad] : Abdominal Pad [0] : left 0 [1+] : left 1+ [Varicose Veins Of Lower Extremities] : not present [] : not present [Purpura] : no purpura  [Petechiae] : no petechiae [Skin Ulcer] : ulcer [Skin Induration] : no induration [Alert] : alert [Oriented to Person] : oriented to person [Oriented to Place] : oriented to place [Oriented to Time] : oriented to time [Calm] : calm [de-identified] : adult WM, NAD, alert, Ox 3. [de-identified] : Diabetic Charcot right foot deformity [de-identified] : HTN, HLD [de-identified] : right foot plantar ulcer down to skin, subcutaneous tissue, fat. new right lateral 5th metatarsal ulcer down to skin, subcutaneous tissue, and fat. [de-identified] : Diabetic neuropathy  [de-identified] : small amount of blood loss  post callus removal, hemostasis achieved [FreeTextEntry1] : Right Plantar Foot [FreeTextEntry2] : 2.3 [FreeTextEntry3] : 2.4 [FreeTextEntry4] : 0.2-0.3 [de-identified] : serous/sanguineous [de-identified] : callus- shaved by Dr. Araiza [de-identified] : silver alginate [de-identified] : small amount of blood loss  post callus removal, hemostasis achieved [de-identified] : Mechanically cleansed with Sterile gauze and 0.9% Normal Saline\par \par Kerlix [FreeTextEntry7] : Right Lateral Foot 5th met [FreeTextEntry8] : 1.2 [FreeTextEntry9] : 1.3 [de-identified] : 0.2 [de-identified] : serous/sanguineous [de-identified] : callus- shaved by Dr. Araiza [de-identified] : 10-20% [de-identified] : silver alginate [TWNoteComboBox4] : Small [de-identified] : Mechanically cleansed with Sterile gauze and 0.9% Normal Saline\par \par Kerlix [TWNoteComboBox5] : No [TWNoteComboBox6] : Diabetic [de-identified] : No [de-identified] : None [de-identified] : other [de-identified] : None [de-identified] : 100% [de-identified] : No [de-identified] : Every other day [de-identified] : Primary Dressing [de-identified] : No [de-identified] : Small [de-identified] : Diabetic [de-identified] : No [de-identified] : other [de-identified] : None [de-identified] : None [de-identified] : >75% [de-identified] : Yes [de-identified] : Every other day [de-identified] : Primary Dressing

## 2022-04-02 NOTE — HISTORY OF PRESENT ILLNESS
[FreeTextEntry1] : DFU 2 plantar base 5th metatarsal clean , granular , DFU 3 lateral head 5th metatarsal right foot , clean awaiting bone scan

## 2022-04-02 NOTE — PLAN
[FreeTextEntry1] : #15 blade used to trim all callused areas around the ulcers , stable , clean no soi .  Patient may need bone resection pending bone scan Spent 20 minutes for patient care and medical decision making.\par

## 2022-04-02 NOTE — ASSESSMENT
[Verbal] : Verbal [Demo] : Demo [Patient] : Patient [Spouse] : Spouse [Good - alert, interested, motivated] : Good - alert, interested, motivated [Verbalizes knowledge/Understanding] : Verbalizes knowledge/understanding [Dressing changes] : dressing changes [Foot Care] : foot care [Skin Care] : skin care [Pressure relief] : pressure relief [Signs and symptoms of infection] : sign and symptoms of infection [Nutrition] : nutrition [How and When to Call] : how and when to call [Off-loading] : off-loading [Home Health] : home health [Patient responsibility to plan of care] : patient responsibility to plan of care [Glycemic Control] : glycemic control [] : Yes [Stable] : stable [Home] : Home [Ambulatory] : Ambulatory [Not Applicable - Long Term Care/Home Health Agency] : Long Term Care/Home Health Agency: Not Applicable [FreeTextEntry2] : Infection Prevention\par Promote Skin Integrity\par Encourage Glycemic Control\par Pressure relief/offloading\par Check feet daily for changes to skin integrity\par \par  [FreeTextEntry4] : F/U 1 week for assessment\par Pt to see endocrinologist regarding elevated HgA1c levels this month\par Bone scan was approved but scheduling is pending, pt provided with contact info for Nuclear Medicine if no call is received.\par Pt stated he was going to home after appointment and was advised to elevate lower extremity when home, maintain pressure off his right foot, and that a small amount of strikethrough drainage would be normal. Pt verbalized understanding and agreed to the above stated.

## 2022-04-03 DIAGNOSIS — Z86.16 PERSONAL HISTORY OF COVID-19: ICD-10-CM

## 2022-04-03 DIAGNOSIS — E11.51 TYPE 2 DIABETES MELLITUS WITH DIABETIC PERIPHERAL ANGIOPATHY WITHOUT GANGRENE: ICD-10-CM

## 2022-04-03 DIAGNOSIS — Z89.421 ACQUIRED ABSENCE OF OTHER RIGHT TOE(S): ICD-10-CM

## 2022-04-03 DIAGNOSIS — G47.33 OBSTRUCTIVE SLEEP APNEA (ADULT) (PEDIATRIC): ICD-10-CM

## 2022-04-03 DIAGNOSIS — Z80.3 FAMILY HISTORY OF MALIGNANT NEOPLASM OF BREAST: ICD-10-CM

## 2022-04-03 DIAGNOSIS — Z83.3 FAMILY HISTORY OF DIABETES MELLITUS: ICD-10-CM

## 2022-04-03 DIAGNOSIS — E11.610 TYPE 2 DIABETES MELLITUS WITH DIABETIC NEUROPATHIC ARTHROPATHY: ICD-10-CM

## 2022-04-03 DIAGNOSIS — Z86.73 PERSONAL HISTORY OF TRANSIENT ISCHEMIC ATTACK (TIA), AND CEREBRAL INFARCTION WITHOUT RESIDUAL DEFICITS: ICD-10-CM

## 2022-04-03 DIAGNOSIS — Z85.828 PERSONAL HISTORY OF OTHER MALIGNANT NEOPLASM OF SKIN: ICD-10-CM

## 2022-04-03 DIAGNOSIS — L84 CORNS AND CALLOSITIES: ICD-10-CM

## 2022-04-03 DIAGNOSIS — Z80.8 FAMILY HISTORY OF MALIGNANT NEOPLASM OF OTHER ORGANS OR SYSTEMS: ICD-10-CM

## 2022-04-03 DIAGNOSIS — Z79.4 LONG TERM (CURRENT) USE OF INSULIN: ICD-10-CM

## 2022-04-03 DIAGNOSIS — Z79.899 OTHER LONG TERM (CURRENT) DRUG THERAPY: ICD-10-CM

## 2022-04-03 DIAGNOSIS — Z94.0 KIDNEY TRANSPLANT STATUS: ICD-10-CM

## 2022-04-03 DIAGNOSIS — E78.5 HYPERLIPIDEMIA, UNSPECIFIED: ICD-10-CM

## 2022-04-03 DIAGNOSIS — Z86.718 PERSONAL HISTORY OF OTHER VENOUS THROMBOSIS AND EMBOLISM: ICD-10-CM

## 2022-04-03 DIAGNOSIS — L97.412 NON-PRESSURE CHRONIC ULCER OF RIGHT HEEL AND MIDFOOT WITH FAT LAYER EXPOSED: ICD-10-CM

## 2022-04-03 DIAGNOSIS — E11.621 TYPE 2 DIABETES MELLITUS WITH FOOT ULCER: ICD-10-CM

## 2022-04-03 DIAGNOSIS — Z79.82 LONG TERM (CURRENT) USE OF ASPIRIN: ICD-10-CM

## 2022-04-03 DIAGNOSIS — Z87.81 PERSONAL HISTORY OF (HEALED) TRAUMATIC FRACTURE: ICD-10-CM

## 2022-04-03 DIAGNOSIS — Z89.422 ACQUIRED ABSENCE OF OTHER LEFT TOE(S): ICD-10-CM

## 2022-04-07 ENCOUNTER — APPOINTMENT (OUTPATIENT)
Dept: WOUND CARE | Facility: HOSPITAL | Age: 57
End: 2022-04-07
Payer: MEDICARE

## 2022-04-07 ENCOUNTER — OUTPATIENT (OUTPATIENT)
Dept: OUTPATIENT SERVICES | Facility: HOSPITAL | Age: 57
LOS: 1 days | Discharge: HOME | End: 2022-04-07
Payer: MEDICARE

## 2022-04-07 ENCOUNTER — OUTPATIENT (OUTPATIENT)
Dept: OUTPATIENT SERVICES | Facility: HOSPITAL | Age: 57
LOS: 1 days | End: 2022-04-07
Payer: MEDICARE

## 2022-04-07 VITALS
OXYGEN SATURATION: 96 % | HEIGHT: 73 IN | BODY MASS INDEX: 39.76 KG/M2 | DIASTOLIC BLOOD PRESSURE: 74 MMHG | HEART RATE: 62 BPM | WEIGHT: 300 LBS | TEMPERATURE: 97 F | RESPIRATION RATE: 20 BRPM | SYSTOLIC BLOOD PRESSURE: 114 MMHG

## 2022-04-07 DIAGNOSIS — Z94.0 KIDNEY TRANSPLANT STATUS: Chronic | ICD-10-CM

## 2022-04-07 DIAGNOSIS — Z89.421 ACQUIRED ABSENCE OF OTHER RIGHT TOE(S): Chronic | ICD-10-CM

## 2022-04-07 DIAGNOSIS — N18.6 END STAGE RENAL DISEASE: Chronic | ICD-10-CM

## 2022-04-07 DIAGNOSIS — E11.621 TYPE 2 DIABETES MELLITUS WITH FOOT ULCER: ICD-10-CM

## 2022-04-07 DIAGNOSIS — Z00.00 ENCOUNTER FOR GENERAL ADULT MEDICAL EXAMINATION WITHOUT ABNORMAL FINDINGS: ICD-10-CM

## 2022-04-07 DIAGNOSIS — M86.9 OSTEOMYELITIS, UNSPECIFIED: ICD-10-CM

## 2022-04-07 DIAGNOSIS — M86.679 OTHER CHRONIC OSTEOMYELITIS, UNSPECIFIED ANKLE AND FOOT: ICD-10-CM

## 2022-04-07 PROCEDURE — 78800 RP LOCLZJ TUM 1 AREA 1 D IMG: CPT

## 2022-04-07 PROCEDURE — 99213 OFFICE O/P EST LOW 20 MIN: CPT

## 2022-04-07 NOTE — ASSESSMENT
[Verbal] : Verbal [Demo] : Demo [Patient] : Patient [Spouse] : Spouse [Good - alert, interested, motivated] : Good - alert, interested, motivated [Verbalizes knowledge/Understanding] : Verbalizes knowledge/understanding [Dressing changes] : dressing changes [Foot Care] : foot care [Skin Care] : skin care [Pressure relief] : pressure relief [Signs and symptoms of infection] : sign and symptoms of infection [Nutrition] : nutrition [How and When to Call] : how and when to call [Off-loading] : off-loading [Home Health] : home health [Patient responsibility to plan of care] : patient responsibility to plan of care [Glycemic Control] : glycemic control [Stable] : stable [Home] : Home [Ambulatory] : Ambulatory [Not Applicable - Long Term Care/Home Health Agency] : Long Term Care/Home Health Agency: Not Applicable [] : No [FreeTextEntry2] : Glucose Control \par Localized Wound Care \par Infection Prevention \par Offloading/Pressure Relief  [FreeTextEntry4] : Pt has an appointment to see endocrinologist regarding elevated HgA1c levels 4/20/22\par Pt had 1st part of Bone scan in Nuclear Medicine today, to complete scan tomorrow.\par Pt to F/U to [facility] in 1 Week

## 2022-04-07 NOTE — PHYSICAL EXAM
[4 x 4] : 4 x 4  [Abdominal Pad] : Abdominal Pad [0] : left 0 [1+] : left 1+ [Varicose Veins Of Lower Extremities] : not present [] : not present [Purpura] : no purpura  [Petechiae] : no petechiae [Skin Ulcer] : ulcer [Skin Induration] : no induration [Alert] : alert [Oriented to Person] : oriented to person [Oriented to Place] : oriented to place [Oriented to Time] : oriented to time [Calm] : calm [de-identified] : adult WM, NAD, alert, Ox 3. [de-identified] : HTN, HLD [de-identified] : Diabetic Charcot right foot deformity [de-identified] : right foot plantar ulcer down to skin, subcutaneous tissue, fat. new right lateral 5th metatarsal ulcer down to skin, subcutaneous tissue, and fat. [de-identified] : Diabetic neuropathy  [FreeTextEntry1] : Right Plantar Foot [FreeTextEntry2] : 2.3 [FreeTextEntry3] : 2.4 [FreeTextEntry4] : 0.2-0.3 [de-identified] : serous/sanguineous [de-identified] : mild [de-identified] : Silver Alginate  [de-identified] : Mechanically Cleansed with Sterile Gauze & Normal Saline  [FreeTextEntry7] : Right Lateral Foot 5th met [FreeTextEntry8] : 1.2 [FreeTextEntry9] : 1.3 [de-identified] : 0.2 [de-identified] : serous/sanguineous [de-identified] : callus [de-identified] : 90% [de-identified] : 10% [de-identified] : Silver Alginate  [de-identified] : Mechanically Cleansed with Sterile Gauze & Normal Saline \par Kerlix [TWNoteComboBox4] : Small [TWNoteComboBox5] : No [TWNoteComboBox6] : Diabetic [de-identified] : No [de-identified] : Macerated [de-identified] : None [de-identified] : None [de-identified] : 100% [de-identified] : No [de-identified] : Every other day [de-identified] : Primary Dressing [de-identified] : Small [de-identified] : No [de-identified] : Diabetic [de-identified] : No [de-identified] : other [de-identified] : None [de-identified] : None [de-identified] : >75% [de-identified] : Yes [de-identified] : Every other day [de-identified] : Primary Dressing

## 2022-04-07 NOTE — HISTORY OF PRESENT ILLNESS
[FreeTextEntry1] : right DFU 3 lateral 5th metatarsal head and DFU 2 plantar right base 5th metatarsal , clean granular and smaller

## 2022-04-07 NOTE — PLAN
[FreeTextEntry1] : Patient to have bone scan tomorrow , sx pending results , continue off loading and wound care   Spent 20 minutes for patient care and medical decision making.\par

## 2022-04-07 NOTE — REVIEW OF SYSTEMS
[Fever] : no fever [Chills] : no chills [Eye Pain] : no eye pain [Loss Of Hearing] : no hearing loss [Shortness Of Breath] : no shortness of breath [Abdominal Pain] : no abdominal pain [Vomiting] : no vomiting [Skin Lesions] : no skin lesions [Skin Wound] : no skin wound [Anxiety] : no anxiety [Easy Bleeding] : no tendency for easy bleeding [FreeTextEntry5] : HTN,HLD [FreeTextEntry7] : 40.9 BMI , morbid obesity  [FreeTextEntry8] : Kidney Transplant  [FreeTextEntry9] : Associated Diabetic Charcot foot  [de-identified] : plantar right foot midfoot , lateral  DFU 2  , large area of peripheral callus build up , the ulcer is clean and granular [de-identified] : IDDM with neuropathy  [de-identified] : MARILUZ

## 2022-04-08 ENCOUNTER — RESULT REVIEW (OUTPATIENT)
Age: 57
End: 2022-04-08

## 2022-04-08 PROCEDURE — 96372 THER/PROPH/DIAG INJ SC/IM: CPT

## 2022-04-08 PROCEDURE — G0463: CPT | Mod: 25

## 2022-04-08 PROCEDURE — A9569: CPT

## 2022-04-08 PROCEDURE — 78800 RP LOCLZJ TUM 1 AREA 1 D IMG: CPT | Mod: 26,MH

## 2022-04-08 PROCEDURE — 78800 RP LOCLZJ TUM 1 AREA 1 D IMG: CPT

## 2022-04-09 DIAGNOSIS — Z79.899 OTHER LONG TERM (CURRENT) DRUG THERAPY: ICD-10-CM

## 2022-04-09 DIAGNOSIS — Z89.422 ACQUIRED ABSENCE OF OTHER LEFT TOE(S): ICD-10-CM

## 2022-04-09 DIAGNOSIS — E78.5 HYPERLIPIDEMIA, UNSPECIFIED: ICD-10-CM

## 2022-04-09 DIAGNOSIS — G47.33 OBSTRUCTIVE SLEEP APNEA (ADULT) (PEDIATRIC): ICD-10-CM

## 2022-04-09 DIAGNOSIS — E11.610 TYPE 2 DIABETES MELLITUS WITH DIABETIC NEUROPATHIC ARTHROPATHY: ICD-10-CM

## 2022-04-09 DIAGNOSIS — Z87.81 PERSONAL HISTORY OF (HEALED) TRAUMATIC FRACTURE: ICD-10-CM

## 2022-04-09 DIAGNOSIS — L84 CORNS AND CALLOSITIES: ICD-10-CM

## 2022-04-09 DIAGNOSIS — Z86.73 PERSONAL HISTORY OF TRANSIENT ISCHEMIC ATTACK (TIA), AND CEREBRAL INFARCTION WITHOUT RESIDUAL DEFICITS: ICD-10-CM

## 2022-04-09 DIAGNOSIS — E11.51 TYPE 2 DIABETES MELLITUS WITH DIABETIC PERIPHERAL ANGIOPATHY WITHOUT GANGRENE: ICD-10-CM

## 2022-04-09 DIAGNOSIS — L97.412 NON-PRESSURE CHRONIC ULCER OF RIGHT HEEL AND MIDFOOT WITH FAT LAYER EXPOSED: ICD-10-CM

## 2022-04-09 DIAGNOSIS — Z89.421 ACQUIRED ABSENCE OF OTHER RIGHT TOE(S): ICD-10-CM

## 2022-04-09 DIAGNOSIS — Z86.718 PERSONAL HISTORY OF OTHER VENOUS THROMBOSIS AND EMBOLISM: ICD-10-CM

## 2022-04-09 DIAGNOSIS — Z80.8 FAMILY HISTORY OF MALIGNANT NEOPLASM OF OTHER ORGANS OR SYSTEMS: ICD-10-CM

## 2022-04-09 DIAGNOSIS — Z79.82 LONG TERM (CURRENT) USE OF ASPIRIN: ICD-10-CM

## 2022-04-09 DIAGNOSIS — Z80.3 FAMILY HISTORY OF MALIGNANT NEOPLASM OF BREAST: ICD-10-CM

## 2022-04-09 DIAGNOSIS — Z83.3 FAMILY HISTORY OF DIABETES MELLITUS: ICD-10-CM

## 2022-04-09 DIAGNOSIS — Z79.4 LONG TERM (CURRENT) USE OF INSULIN: ICD-10-CM

## 2022-04-09 DIAGNOSIS — E11.621 TYPE 2 DIABETES MELLITUS WITH FOOT ULCER: ICD-10-CM

## 2022-04-09 DIAGNOSIS — Z86.16 PERSONAL HISTORY OF COVID-19: ICD-10-CM

## 2022-04-09 DIAGNOSIS — Z94.0 KIDNEY TRANSPLANT STATUS: ICD-10-CM

## 2022-04-09 DIAGNOSIS — Z85.828 PERSONAL HISTORY OF OTHER MALIGNANT NEOPLASM OF SKIN: ICD-10-CM

## 2022-04-11 NOTE — PHYSICAL THERAPY INITIAL EVALUATION ADULT - PATIENT PROFILE REVIEW, REHAB EVAL
yes
Alert-The patient is alert, awake and responds to voice. The patient is oriented to time, place, and person. The triage nurse is able to obtain subjective information.

## 2022-04-13 ENCOUNTER — NON-APPOINTMENT (OUTPATIENT)
Age: 57
End: 2022-04-13

## 2022-04-14 ENCOUNTER — OUTPATIENT (OUTPATIENT)
Dept: OUTPATIENT SERVICES | Facility: HOSPITAL | Age: 57
LOS: 1 days | Discharge: HOME | End: 2022-04-14
Payer: MEDICARE

## 2022-04-14 ENCOUNTER — APPOINTMENT (OUTPATIENT)
Dept: WOUND CARE | Facility: HOSPITAL | Age: 57
End: 2022-04-14
Payer: MEDICARE

## 2022-04-14 VITALS
DIASTOLIC BLOOD PRESSURE: 69 MMHG | RESPIRATION RATE: 20 BRPM | HEIGHT: 73 IN | TEMPERATURE: 97.4 F | OXYGEN SATURATION: 93 % | HEART RATE: 61 BPM | BODY MASS INDEX: 39.76 KG/M2 | WEIGHT: 300 LBS | SYSTOLIC BLOOD PRESSURE: 131 MMHG

## 2022-04-14 DIAGNOSIS — N18.6 END STAGE RENAL DISEASE: Chronic | ICD-10-CM

## 2022-04-14 DIAGNOSIS — Z94.0 KIDNEY TRANSPLANT STATUS: Chronic | ICD-10-CM

## 2022-04-14 DIAGNOSIS — Z89.421 ACQUIRED ABSENCE OF OTHER RIGHT TOE(S): Chronic | ICD-10-CM

## 2022-04-14 DIAGNOSIS — E11.621 TYPE 2 DIABETES MELLITUS WITH FOOT ULCER: ICD-10-CM

## 2022-04-14 PROCEDURE — G0463: CPT

## 2022-04-14 PROCEDURE — 99213 OFFICE O/P EST LOW 20 MIN: CPT

## 2022-04-14 NOTE — HISTORY OF PRESENT ILLNESS
[FreeTextEntry1] : Bone scan + for suspect OM of the right foot 5th metatarsal . Ulcers are stable with no clinical signs of infection

## 2022-04-14 NOTE — ASSESSMENT
[Verbal] : Verbal [Demo] : Demo [Patient] : Patient [Good - alert, interested, motivated] : Good - alert, interested, motivated [Verbalizes knowledge/Understanding] : Verbalizes knowledge/understanding [Dressing changes] : dressing changes [Foot Care] : foot care [Skin Care] : skin care [Pressure relief] : pressure relief [Signs and symptoms of infection] : sign and symptoms of infection [Nutrition] : nutrition [How and When to Call] : how and when to call [Off-loading] : off-loading [Patient responsibility to plan of care] : patient responsibility to plan of care [Glycemic Control] : glycemic control [] : Yes [Stable] : stable [Home] : Home [Ambulatory] : Ambulatory [FreeTextEntry2] : Promote Skin Integrity \par S/S of Infection \par Dressing Changes \par Diagnostic Testing \par Glycemic Control\par Foot Care\par Nutrition and Wound Care \par Offloading and Elevation Compliance \par F/U 1 week  [FreeTextEntry4] : DPM reviewed bone scan results with patient during today's visit. \par Callus shaved by DPM. \par DPM would like pt to follow up at [facility] for one more assessment before determining a need for surgical intervention. \par Pt verbalized understanding of topics discussed in today's visit. \par F/U 1 week

## 2022-04-14 NOTE — REVIEW OF SYSTEMS
[Fever] : no fever [Chills] : no chills [Eye Pain] : no eye pain [Loss Of Hearing] : no hearing loss [Shortness Of Breath] : no shortness of breath [Abdominal Pain] : no abdominal pain [Vomiting] : no vomiting [Skin Lesions] : no skin lesions [Skin Wound] : no skin wound [Anxiety] : no anxiety [Easy Bleeding] : no tendency for easy bleeding [FreeTextEntry5] : HTN,HLD [FreeTextEntry7] : 40.9 BMI , morbid obesity  [FreeTextEntry8] : Kidney Transplant  [FreeTextEntry9] : Associated Diabetic Charcot foot  [de-identified] : plantar right foot midfoot , lateral  DFU 2  , large area of peripheral callus build up , the ulcer is clean and granular [de-identified] : IDDM with neuropathy  [de-identified] : MARILUZ

## 2022-04-14 NOTE — PLAN
[FreeTextEntry1] : Ulcers are currently stable , long term plan if ulcers done close , removed 5th met head and plantar base of the 5th metatarsal left foot . All risks and benefits discussed all questions answered , patient agree with the plan  PTR 1week  Spent 20 minutes for patient care and medical decision making.\par

## 2022-04-16 DIAGNOSIS — Z86.73 PERSONAL HISTORY OF TRANSIENT ISCHEMIC ATTACK (TIA), AND CEREBRAL INFARCTION WITHOUT RESIDUAL DEFICITS: ICD-10-CM

## 2022-04-16 DIAGNOSIS — Z89.422 ACQUIRED ABSENCE OF OTHER LEFT TOE(S): ICD-10-CM

## 2022-04-16 DIAGNOSIS — L84 CORNS AND CALLOSITIES: ICD-10-CM

## 2022-04-16 DIAGNOSIS — Z85.828 PERSONAL HISTORY OF OTHER MALIGNANT NEOPLASM OF SKIN: ICD-10-CM

## 2022-04-16 DIAGNOSIS — Z83.3 FAMILY HISTORY OF DIABETES MELLITUS: ICD-10-CM

## 2022-04-16 DIAGNOSIS — E11.621 TYPE 2 DIABETES MELLITUS WITH FOOT ULCER: ICD-10-CM

## 2022-04-16 DIAGNOSIS — E78.5 HYPERLIPIDEMIA, UNSPECIFIED: ICD-10-CM

## 2022-04-16 DIAGNOSIS — Z79.82 LONG TERM (CURRENT) USE OF ASPIRIN: ICD-10-CM

## 2022-04-16 DIAGNOSIS — Z80.3 FAMILY HISTORY OF MALIGNANT NEOPLASM OF BREAST: ICD-10-CM

## 2022-04-16 DIAGNOSIS — Z79.4 LONG TERM (CURRENT) USE OF INSULIN: ICD-10-CM

## 2022-04-16 DIAGNOSIS — G47.33 OBSTRUCTIVE SLEEP APNEA (ADULT) (PEDIATRIC): ICD-10-CM

## 2022-04-16 DIAGNOSIS — Z94.0 KIDNEY TRANSPLANT STATUS: ICD-10-CM

## 2022-04-16 DIAGNOSIS — Z80.8 FAMILY HISTORY OF MALIGNANT NEOPLASM OF OTHER ORGANS OR SYSTEMS: ICD-10-CM

## 2022-04-16 DIAGNOSIS — L97.412 NON-PRESSURE CHRONIC ULCER OF RIGHT HEEL AND MIDFOOT WITH FAT LAYER EXPOSED: ICD-10-CM

## 2022-04-16 DIAGNOSIS — Z79.899 OTHER LONG TERM (CURRENT) DRUG THERAPY: ICD-10-CM

## 2022-04-16 DIAGNOSIS — E11.51 TYPE 2 DIABETES MELLITUS WITH DIABETIC PERIPHERAL ANGIOPATHY WITHOUT GANGRENE: ICD-10-CM

## 2022-04-16 DIAGNOSIS — Z89.421 ACQUIRED ABSENCE OF OTHER RIGHT TOE(S): ICD-10-CM

## 2022-04-16 DIAGNOSIS — Z86.718 PERSONAL HISTORY OF OTHER VENOUS THROMBOSIS AND EMBOLISM: ICD-10-CM

## 2022-04-16 DIAGNOSIS — Z87.81 PERSONAL HISTORY OF (HEALED) TRAUMATIC FRACTURE: ICD-10-CM

## 2022-04-16 DIAGNOSIS — Z86.16 PERSONAL HISTORY OF COVID-19: ICD-10-CM

## 2022-04-16 DIAGNOSIS — E11.610 TYPE 2 DIABETES MELLITUS WITH DIABETIC NEUROPATHIC ARTHROPATHY: ICD-10-CM

## 2022-04-21 ENCOUNTER — OUTPATIENT (OUTPATIENT)
Dept: OUTPATIENT SERVICES | Facility: HOSPITAL | Age: 57
LOS: 1 days | Discharge: HOME | End: 2022-04-21
Payer: MEDICARE

## 2022-04-21 ENCOUNTER — APPOINTMENT (OUTPATIENT)
Dept: WOUND CARE | Facility: HOSPITAL | Age: 57
End: 2022-04-21
Payer: MEDICARE

## 2022-04-21 VITALS
WEIGHT: 300 LBS | SYSTOLIC BLOOD PRESSURE: 139 MMHG | HEIGHT: 73 IN | OXYGEN SATURATION: 94 % | DIASTOLIC BLOOD PRESSURE: 74 MMHG | TEMPERATURE: 99.1 F | HEART RATE: 61 BPM | BODY MASS INDEX: 39.76 KG/M2 | RESPIRATION RATE: 20 BRPM

## 2022-04-21 DIAGNOSIS — Z89.421 ACQUIRED ABSENCE OF OTHER RIGHT TOE(S): Chronic | ICD-10-CM

## 2022-04-21 DIAGNOSIS — N18.6 END STAGE RENAL DISEASE: Chronic | ICD-10-CM

## 2022-04-21 DIAGNOSIS — E11.621 TYPE 2 DIABETES MELLITUS WITH FOOT ULCER: ICD-10-CM

## 2022-04-21 DIAGNOSIS — Z94.0 KIDNEY TRANSPLANT STATUS: Chronic | ICD-10-CM

## 2022-04-21 PROCEDURE — 99213 OFFICE O/P EST LOW 20 MIN: CPT

## 2022-04-21 PROCEDURE — G0463: CPT

## 2022-04-21 NOTE — REVIEW OF SYSTEMS
[Fever] : no fever [Chills] : no chills [Eye Pain] : no eye pain [Loss Of Hearing] : no hearing loss [Shortness Of Breath] : no shortness of breath [Abdominal Pain] : no abdominal pain [Vomiting] : no vomiting [Skin Lesions] : no skin lesions [Skin Wound] : no skin wound [Anxiety] : no anxiety [Easy Bleeding] : no tendency for easy bleeding [FreeTextEntry5] : HTN,HLD [FreeTextEntry7] : 40.9 BMI , morbid obesity  [FreeTextEntry8] : Kidney Transplant  [FreeTextEntry9] : Associated Diabetic Charcot foot  [de-identified] : plantar right foot midfoot , lateral  DFU 2  , large area of peripheral callus build up , the ulcer is clean and granular [de-identified] : IDDM with neuropathy  [de-identified] : MARILUZ

## 2022-04-21 NOTE — ASSESSMENT
[Verbal] : Verbal [Written] : Written [Demo] : Demo [Patient] : Patient [Good - alert, interested, motivated] : Good - alert, interested, motivated [Verbalizes knowledge/Understanding] : Verbalizes knowledge/understanding [Dressing changes] : dressing changes [Foot Care] : foot care [Skin Care] : skin care [Signs and symptoms of infection] : sign and symptoms of infection [Surgery] : surgery [Nutrition] : nutrition [How and When to Call] : how and when to call [Pain Management] : pain management [Off-loading] : off-loading [Patient responsibility to plan of care] : patient responsibility to plan of care [Glycemic Control] : glycemic control [Stable] : stable [Home] : Home [Ambulatory] : Ambulatory [Not Applicable - Long Term Care/Home Health Agency] : Long Term Care/Home Health Agency: Not Applicable [] : No [FreeTextEntry2] : Infection prevention\par Localized wound care \par Goal remaining pain free regarding wounds\par Offloading \par Low glycemic diet  [FreeTextEntry4] : Plan for patient to be admitted to [facility] some time within the next 2 weeks for surgical intervention. \par follow up in 1 week

## 2022-04-21 NOTE — PHYSICAL EXAM
[0] : left 0 [1+] : left 1+ [Skin Ulcer] : ulcer [Alert] : alert [Oriented to Person] : oriented to person [Oriented to Place] : oriented to place [Oriented to Time] : oriented to time [Calm] : calm [4 x 4] : 4 x 4  [Abdominal Pad] : Abdominal Pad [Varicose Veins Of Lower Extremities] : not present [] : not present [Purpura] : no purpura  [Petechiae] : no petechiae [Skin Induration] : no induration [de-identified] : adult WM, NAD, alert, Ox 3. [de-identified] : HTN, HLD [de-identified] : Diabetic Charcot right foot deformity [de-identified] : right foot plantar ulcer down to skin, subcutaneous tissue, fat. right lateral 5th metatarsal ulcer down to skin, subcutaneous tissue, and fat. [de-identified] : Diabetic neuropathy  [FreeTextEntry1] : Right plantar foot  [FreeTextEntry2] : 2.2 [FreeTextEntry3] : 2.3 [FreeTextEntry4] : 0.2 [de-identified] : Callus  [de-identified] : Serous/sanguinous [de-identified] : Silver alginate [de-identified] : Mechanically cleansed with sterile gauze and normal saline.\par Kerlix \par * callus shaved by DPM  [FreeTextEntry7] : Right lateral foot 5th Met  [FreeTextEntry8] : 1.1 [FreeTextEntry9] : 1.3 [de-identified] : 0.2 [de-identified] : Serous/sanguinous [de-identified] : Callus  [de-identified] : 20% [de-identified] : Silver alginate [de-identified] : Mechanically cleansed with sterile gauze and normal saline.\par Kerlix \par * callus shaved by DPM  [de-identified] : other [TWNoteComboBox4] : Moderate [de-identified] : None [de-identified] : None [de-identified] : 100% [de-identified] : No [de-identified] : Every other day [de-identified] : Primary Dressing [de-identified] : Moderate [de-identified] : other [de-identified] : None [de-identified] : None [de-identified] : >75% [de-identified] : Yes [de-identified] : Every other day [de-identified] : Primary Dressing

## 2022-04-22 DIAGNOSIS — G47.33 OBSTRUCTIVE SLEEP APNEA (ADULT) (PEDIATRIC): ICD-10-CM

## 2022-04-22 DIAGNOSIS — Z79.4 LONG TERM (CURRENT) USE OF INSULIN: ICD-10-CM

## 2022-04-22 DIAGNOSIS — Z79.899 OTHER LONG TERM (CURRENT) DRUG THERAPY: ICD-10-CM

## 2022-04-22 DIAGNOSIS — Z80.8 FAMILY HISTORY OF MALIGNANT NEOPLASM OF OTHER ORGANS OR SYSTEMS: ICD-10-CM

## 2022-04-22 DIAGNOSIS — Z86.16 PERSONAL HISTORY OF COVID-19: ICD-10-CM

## 2022-04-22 DIAGNOSIS — Z86.718 PERSONAL HISTORY OF OTHER VENOUS THROMBOSIS AND EMBOLISM: ICD-10-CM

## 2022-04-22 DIAGNOSIS — E11.610 TYPE 2 DIABETES MELLITUS WITH DIABETIC NEUROPATHIC ARTHROPATHY: ICD-10-CM

## 2022-04-22 DIAGNOSIS — L84 CORNS AND CALLOSITIES: ICD-10-CM

## 2022-04-22 DIAGNOSIS — E11.621 TYPE 2 DIABETES MELLITUS WITH FOOT ULCER: ICD-10-CM

## 2022-04-22 DIAGNOSIS — Z94.0 KIDNEY TRANSPLANT STATUS: ICD-10-CM

## 2022-04-22 DIAGNOSIS — E78.5 HYPERLIPIDEMIA, UNSPECIFIED: ICD-10-CM

## 2022-04-22 DIAGNOSIS — Z86.73 PERSONAL HISTORY OF TRANSIENT ISCHEMIC ATTACK (TIA), AND CEREBRAL INFARCTION WITHOUT RESIDUAL DEFICITS: ICD-10-CM

## 2022-04-22 DIAGNOSIS — E11.51 TYPE 2 DIABETES MELLITUS WITH DIABETIC PERIPHERAL ANGIOPATHY WITHOUT GANGRENE: ICD-10-CM

## 2022-04-22 DIAGNOSIS — Z89.422 ACQUIRED ABSENCE OF OTHER LEFT TOE(S): ICD-10-CM

## 2022-04-22 DIAGNOSIS — Z87.81 PERSONAL HISTORY OF (HEALED) TRAUMATIC FRACTURE: ICD-10-CM

## 2022-04-22 DIAGNOSIS — Z83.3 FAMILY HISTORY OF DIABETES MELLITUS: ICD-10-CM

## 2022-04-22 DIAGNOSIS — Z80.3 FAMILY HISTORY OF MALIGNANT NEOPLASM OF BREAST: ICD-10-CM

## 2022-04-22 DIAGNOSIS — L97.412 NON-PRESSURE CHRONIC ULCER OF RIGHT HEEL AND MIDFOOT WITH FAT LAYER EXPOSED: ICD-10-CM

## 2022-04-22 DIAGNOSIS — Z79.82 LONG TERM (CURRENT) USE OF ASPIRIN: ICD-10-CM

## 2022-04-22 DIAGNOSIS — Z89.421 ACQUIRED ABSENCE OF OTHER RIGHT TOE(S): ICD-10-CM

## 2022-04-22 DIAGNOSIS — Z85.828 PERSONAL HISTORY OF OTHER MALIGNANT NEOPLASM OF SKIN: ICD-10-CM

## 2022-04-28 ENCOUNTER — APPOINTMENT (OUTPATIENT)
Dept: WOUND CARE | Facility: HOSPITAL | Age: 57
End: 2022-04-28
Payer: MEDICARE

## 2022-04-28 ENCOUNTER — OUTPATIENT (OUTPATIENT)
Dept: OUTPATIENT SERVICES | Facility: HOSPITAL | Age: 57
LOS: 1 days | Discharge: HOME | End: 2022-04-28
Payer: MEDICARE

## 2022-04-28 VITALS
DIASTOLIC BLOOD PRESSURE: 67 MMHG | RESPIRATION RATE: 20 BRPM | SYSTOLIC BLOOD PRESSURE: 146 MMHG | TEMPERATURE: 98.1 F | HEART RATE: 57 BPM | WEIGHT: 300 LBS | OXYGEN SATURATION: 93 % | BODY MASS INDEX: 39.76 KG/M2 | HEIGHT: 73 IN

## 2022-04-28 DIAGNOSIS — E11.621 TYPE 2 DIABETES MELLITUS WITH FOOT ULCER: ICD-10-CM

## 2022-04-28 DIAGNOSIS — N18.6 END STAGE RENAL DISEASE: Chronic | ICD-10-CM

## 2022-04-28 DIAGNOSIS — Z89.421 ACQUIRED ABSENCE OF OTHER RIGHT TOE(S): Chronic | ICD-10-CM

## 2022-04-28 DIAGNOSIS — Z94.0 KIDNEY TRANSPLANT STATUS: Chronic | ICD-10-CM

## 2022-04-28 PROCEDURE — G0463: CPT

## 2022-04-28 PROCEDURE — 99213 OFFICE O/P EST LOW 20 MIN: CPT

## 2022-04-28 NOTE — PLAN
[FreeTextEntry1] : Continue wound care and off loading as best as possible . We discussed all risk , benefit, sequela and complications  of surgery . All questions have been answered , patient and daughter understand . The agree to the plan of 5th right methead resection and plantar planing of the 5th metatarsal base Spent 20 minutes for patient care and medical decision making.\par

## 2022-04-28 NOTE — ASSESSMENT
[Verbal] : Verbal [Demo] : Demo [Patient] : Patient [Good - alert, interested, motivated] : Good - alert, interested, motivated [Verbalizes knowledge/Understanding] : Verbalizes knowledge/understanding [Dressing changes] : dressing changes [Foot Care] : foot care [Skin Care] : skin care [Signs and symptoms of infection] : sign and symptoms of infection [Nutrition] : nutrition [How and When to Call] : how and when to call [Pain Management] : pain management [Off-loading] : off-loading [Patient responsibility to plan of care] : patient responsibility to plan of care [Glycemic Control] : glycemic control [] : Yes [Stable] : stable [Home] : Home [Ambulatory] : Ambulatory [FreeTextEntry2] : Pressure Relief \par Localized Wound Care\par Infection Prevention \par Surgical procedure [FreeTextEntry4] : Pt to be admitted to [facility] for surgical intervention on 5/16/22 as per DPM. \par Pt to F/U to [facility] in 1 Week

## 2022-04-28 NOTE — PHYSICAL EXAM
[4 x 4] : 4 x 4  [Abdominal Pad] : Abdominal Pad [0] : left 0 [1+] : left 1+ [Varicose Veins Of Lower Extremities] : not present [] : not present [Purpura] : no purpura  [Petechiae] : no petechiae [Skin Ulcer] : ulcer [Skin Induration] : no induration [Alert] : alert [Oriented to Person] : oriented to person [Oriented to Place] : oriented to place [Oriented to Time] : oriented to time [Calm] : calm [de-identified] : adult WM, NAD, alert, Ox 3. [de-identified] : HTN, HLD [de-identified] : Diabetic Charcot right foot deformity [de-identified] : right foot plantar ulcer down to skin, subcutaneous tissue, fat.  right lateral 5th metatarsal ulcer down to skin, subcutaneous tissue, and fat. [de-identified] : Diabetic neuropathy  [FreeTextEntry1] : Right Plantar Foot  [FreeTextEntry2] : 2.6 [FreeTextEntry3] : 2.5 [FreeTextEntry4] : 0.2 [de-identified] : Serosanguineous  [de-identified] : Callus  [de-identified] : Mechanically Cleansed with Sterile Gauze & Normal Saline \par Kerlix \par Callus removed\par  [de-identified] : Silver Alginate [FreeTextEntry7] : Right Lateral Foot 5th Met  [FreeTextEntry8] : 1.0 [FreeTextEntry9] : 1.0 [de-identified] : 0.2 [de-identified] : Serosanguineous  [de-identified] : Callus  [de-identified] : 20% [de-identified] : Silver Alginate [de-identified] : Mechanically Cleansed with Sterile Gauze & Normal Saline \par Callus removed [TWNoteComboBox4] : Moderate [de-identified] : other [de-identified] : None [de-identified] : None [de-identified] : 100% [de-identified] : No [de-identified] : Every other day [de-identified] : False [de-identified] : Moderate [de-identified] : other [de-identified] : None [de-identified] : None [de-identified] : >75% [de-identified] : Yes [de-identified] : Every other day [de-identified] : False

## 2022-04-28 NOTE — HISTORY OF PRESENT ILLNESS
[FreeTextEntry1] : DFU 3 plantar base 5th metatarsal right foot and DFU 3 lateral 5th metatarsal head right foot  . Bone scan suspicious for OM of the 5th metatarsal head . No clinical sign of infection

## 2022-04-28 NOTE — REVIEW OF SYSTEMS
[Fever] : no fever [Chills] : no chills [Eye Pain] : no eye pain [Loss Of Hearing] : no hearing loss [Abdominal Pain] : no abdominal pain [Shortness Of Breath] : no shortness of breath [Vomiting] : no vomiting [Skin Lesions] : no skin lesions [Skin Wound] : no skin wound [Anxiety] : no anxiety [Easy Bleeding] : no tendency for easy bleeding [FreeTextEntry5] : HTN,HLD [FreeTextEntry7] : 40.9 BMI , morbid obesity  [FreeTextEntry8] : Kidney Transplant  [FreeTextEntry9] : Associated Diabetic Charcot foot  [de-identified] : plantar right foot midfoot , lateral  DFU 2  , large area of peripheral callus build up , the ulcer is clean and granular [de-identified] : IDDM with neuropathy  [de-identified] : MARILUZ

## 2022-04-29 DIAGNOSIS — Z83.3 FAMILY HISTORY OF DIABETES MELLITUS: ICD-10-CM

## 2022-04-29 DIAGNOSIS — E66.01 MORBID (SEVERE) OBESITY DUE TO EXCESS CALORIES: ICD-10-CM

## 2022-04-29 DIAGNOSIS — G47.33 OBSTRUCTIVE SLEEP APNEA (ADULT) (PEDIATRIC): ICD-10-CM

## 2022-04-29 DIAGNOSIS — Z86.16 PERSONAL HISTORY OF COVID-19: ICD-10-CM

## 2022-04-29 DIAGNOSIS — E11.621 TYPE 2 DIABETES MELLITUS WITH FOOT ULCER: ICD-10-CM

## 2022-04-29 DIAGNOSIS — Z89.422 ACQUIRED ABSENCE OF OTHER LEFT TOE(S): ICD-10-CM

## 2022-04-29 DIAGNOSIS — Z80.3 FAMILY HISTORY OF MALIGNANT NEOPLASM OF BREAST: ICD-10-CM

## 2022-04-29 DIAGNOSIS — Z79.899 OTHER LONG TERM (CURRENT) DRUG THERAPY: ICD-10-CM

## 2022-04-29 DIAGNOSIS — Z94.0 KIDNEY TRANSPLANT STATUS: ICD-10-CM

## 2022-04-29 DIAGNOSIS — Z80.8 FAMILY HISTORY OF MALIGNANT NEOPLASM OF OTHER ORGANS OR SYSTEMS: ICD-10-CM

## 2022-04-29 DIAGNOSIS — Z79.82 LONG TERM (CURRENT) USE OF ASPIRIN: ICD-10-CM

## 2022-04-29 DIAGNOSIS — E11.51 TYPE 2 DIABETES MELLITUS WITH DIABETIC PERIPHERAL ANGIOPATHY WITHOUT GANGRENE: ICD-10-CM

## 2022-04-29 DIAGNOSIS — Z86.73 PERSONAL HISTORY OF TRANSIENT ISCHEMIC ATTACK (TIA), AND CEREBRAL INFARCTION WITHOUT RESIDUAL DEFICITS: ICD-10-CM

## 2022-04-29 DIAGNOSIS — Z79.4 LONG TERM (CURRENT) USE OF INSULIN: ICD-10-CM

## 2022-04-29 DIAGNOSIS — E78.5 HYPERLIPIDEMIA, UNSPECIFIED: ICD-10-CM

## 2022-04-29 DIAGNOSIS — E11.610 TYPE 2 DIABETES MELLITUS WITH DIABETIC NEUROPATHIC ARTHROPATHY: ICD-10-CM

## 2022-04-29 DIAGNOSIS — L84 CORNS AND CALLOSITIES: ICD-10-CM

## 2022-04-29 DIAGNOSIS — L97.412 NON-PRESSURE CHRONIC ULCER OF RIGHT HEEL AND MIDFOOT WITH FAT LAYER EXPOSED: ICD-10-CM

## 2022-04-29 DIAGNOSIS — Z89.421 ACQUIRED ABSENCE OF OTHER RIGHT TOE(S): ICD-10-CM

## 2022-04-29 DIAGNOSIS — Z86.718 PERSONAL HISTORY OF OTHER VENOUS THROMBOSIS AND EMBOLISM: ICD-10-CM

## 2022-04-29 DIAGNOSIS — Z87.81 PERSONAL HISTORY OF (HEALED) TRAUMATIC FRACTURE: ICD-10-CM

## 2022-04-29 DIAGNOSIS — Z85.828 PERSONAL HISTORY OF OTHER MALIGNANT NEOPLASM OF SKIN: ICD-10-CM

## 2022-05-04 ENCOUNTER — NON-APPOINTMENT (OUTPATIENT)
Age: 57
End: 2022-05-04

## 2022-05-05 ENCOUNTER — APPOINTMENT (OUTPATIENT)
Dept: WOUND CARE | Facility: HOSPITAL | Age: 57
End: 2022-05-05
Payer: MEDICARE

## 2022-05-05 ENCOUNTER — OUTPATIENT (OUTPATIENT)
Dept: OUTPATIENT SERVICES | Facility: HOSPITAL | Age: 57
LOS: 1 days | Discharge: HOME | End: 2022-05-05
Payer: MEDICARE

## 2022-05-05 DIAGNOSIS — N18.6 END STAGE RENAL DISEASE: Chronic | ICD-10-CM

## 2022-05-05 DIAGNOSIS — Z89.421 ACQUIRED ABSENCE OF OTHER RIGHT TOE(S): Chronic | ICD-10-CM

## 2022-05-05 DIAGNOSIS — Z94.0 KIDNEY TRANSPLANT STATUS: Chronic | ICD-10-CM

## 2022-05-05 DIAGNOSIS — E11.621 TYPE 2 DIABETES MELLITUS WITH FOOT ULCER: ICD-10-CM

## 2022-05-05 PROCEDURE — 99213 OFFICE O/P EST LOW 20 MIN: CPT

## 2022-05-05 PROCEDURE — G0463: CPT

## 2022-05-06 DIAGNOSIS — Z79.4 LONG TERM (CURRENT) USE OF INSULIN: ICD-10-CM

## 2022-05-06 DIAGNOSIS — Z80.8 FAMILY HISTORY OF MALIGNANT NEOPLASM OF OTHER ORGANS OR SYSTEMS: ICD-10-CM

## 2022-05-06 DIAGNOSIS — Z85.828 PERSONAL HISTORY OF OTHER MALIGNANT NEOPLASM OF SKIN: ICD-10-CM

## 2022-05-06 DIAGNOSIS — Z86.73 PERSONAL HISTORY OF TRANSIENT ISCHEMIC ATTACK (TIA), AND CEREBRAL INFARCTION WITHOUT RESIDUAL DEFICITS: ICD-10-CM

## 2022-05-06 DIAGNOSIS — Z89.422 ACQUIRED ABSENCE OF OTHER LEFT TOE(S): ICD-10-CM

## 2022-05-06 DIAGNOSIS — Z79.82 LONG TERM (CURRENT) USE OF ASPIRIN: ICD-10-CM

## 2022-05-06 DIAGNOSIS — E66.01 MORBID (SEVERE) OBESITY DUE TO EXCESS CALORIES: ICD-10-CM

## 2022-05-06 DIAGNOSIS — L97.412 NON-PRESSURE CHRONIC ULCER OF RIGHT HEEL AND MIDFOOT WITH FAT LAYER EXPOSED: ICD-10-CM

## 2022-05-06 DIAGNOSIS — Z86.16 PERSONAL HISTORY OF COVID-19: ICD-10-CM

## 2022-05-06 DIAGNOSIS — Z87.81 PERSONAL HISTORY OF (HEALED) TRAUMATIC FRACTURE: ICD-10-CM

## 2022-05-06 DIAGNOSIS — Z94.0 KIDNEY TRANSPLANT STATUS: ICD-10-CM

## 2022-05-06 DIAGNOSIS — E11.610 TYPE 2 DIABETES MELLITUS WITH DIABETIC NEUROPATHIC ARTHROPATHY: ICD-10-CM

## 2022-05-06 DIAGNOSIS — L84 CORNS AND CALLOSITIES: ICD-10-CM

## 2022-05-06 DIAGNOSIS — Z80.3 FAMILY HISTORY OF MALIGNANT NEOPLASM OF BREAST: ICD-10-CM

## 2022-05-06 DIAGNOSIS — Z89.421 ACQUIRED ABSENCE OF OTHER RIGHT TOE(S): ICD-10-CM

## 2022-05-06 DIAGNOSIS — E78.5 HYPERLIPIDEMIA, UNSPECIFIED: ICD-10-CM

## 2022-05-06 DIAGNOSIS — E11.621 TYPE 2 DIABETES MELLITUS WITH FOOT ULCER: ICD-10-CM

## 2022-05-06 DIAGNOSIS — Z79.899 OTHER LONG TERM (CURRENT) DRUG THERAPY: ICD-10-CM

## 2022-05-06 DIAGNOSIS — Z86.718 PERSONAL HISTORY OF OTHER VENOUS THROMBOSIS AND EMBOLISM: ICD-10-CM

## 2022-05-06 DIAGNOSIS — Z83.3 FAMILY HISTORY OF DIABETES MELLITUS: ICD-10-CM

## 2022-05-06 DIAGNOSIS — E11.51 TYPE 2 DIABETES MELLITUS WITH DIABETIC PERIPHERAL ANGIOPATHY WITHOUT GANGRENE: ICD-10-CM

## 2022-05-06 DIAGNOSIS — G47.33 OBSTRUCTIVE SLEEP APNEA (ADULT) (PEDIATRIC): ICD-10-CM

## 2022-05-10 ENCOUNTER — TRANSCRIPTION ENCOUNTER (OUTPATIENT)
Age: 57
End: 2022-05-10

## 2022-05-10 ENCOUNTER — INPATIENT (INPATIENT)
Facility: HOSPITAL | Age: 57
LOS: 5 days | Discharge: HOME | DRG: 617 | End: 2022-05-16
Attending: INTERNAL MEDICINE | Admitting: INTERNAL MEDICINE
Payer: MEDICARE

## 2022-05-10 ENCOUNTER — APPOINTMENT (OUTPATIENT)
Dept: WOUND CARE | Facility: HOSPITAL | Age: 57
End: 2022-05-10

## 2022-05-10 ENCOUNTER — OUTPATIENT (OUTPATIENT)
Dept: OUTPATIENT SERVICES | Facility: HOSPITAL | Age: 57
LOS: 1 days | Discharge: ANOTHER HOSPITAL | End: 2022-05-10

## 2022-05-10 VITALS
TEMPERATURE: 98 F | WEIGHT: 300.05 LBS | DIASTOLIC BLOOD PRESSURE: 75 MMHG | OXYGEN SATURATION: 99 % | SYSTOLIC BLOOD PRESSURE: 149 MMHG | HEIGHT: 73 IN | RESPIRATION RATE: 18 BRPM | HEART RATE: 85 BPM

## 2022-05-10 VITALS
SYSTOLIC BLOOD PRESSURE: 143 MMHG | RESPIRATION RATE: 20 BRPM | DIASTOLIC BLOOD PRESSURE: 78 MMHG | OXYGEN SATURATION: 96 % | HEART RATE: 62 BPM | HEIGHT: 73 IN | TEMPERATURE: 98.9 F | BODY MASS INDEX: 39.76 KG/M2 | WEIGHT: 300 LBS

## 2022-05-10 DIAGNOSIS — E11.621 TYPE 2 DIABETES MELLITUS WITH FOOT ULCER: ICD-10-CM

## 2022-05-10 DIAGNOSIS — N18.6 END STAGE RENAL DISEASE: Chronic | ICD-10-CM

## 2022-05-10 DIAGNOSIS — Z94.0 KIDNEY TRANSPLANT STATUS: Chronic | ICD-10-CM

## 2022-05-10 DIAGNOSIS — E11.628 TYPE 2 DIABETES MELLITUS WITH OTHER SKIN COMPLICATIONS: ICD-10-CM

## 2022-05-10 DIAGNOSIS — Z89.421 ACQUIRED ABSENCE OF OTHER RIGHT TOE(S): Chronic | ICD-10-CM

## 2022-05-10 DIAGNOSIS — L03.115 CELLULITIS OF RIGHT LOWER LIMB: ICD-10-CM

## 2022-05-10 DIAGNOSIS — I10 ESSENTIAL (PRIMARY) HYPERTENSION: ICD-10-CM

## 2022-05-10 DIAGNOSIS — Z94.0 KIDNEY TRANSPLANT STATUS: ICD-10-CM

## 2022-05-10 DIAGNOSIS — M86.10 OTHER ACUTE OSTEOMYELITIS, UNSPECIFIED SITE: ICD-10-CM

## 2022-05-10 DIAGNOSIS — E11.69 TYPE 2 DIABETES MELLITUS WITH OTHER SPECIFIED COMPLICATION: ICD-10-CM

## 2022-05-10 DIAGNOSIS — E11.9 TYPE 2 DIABETES MELLITUS WITHOUT COMPLICATIONS: ICD-10-CM

## 2022-05-10 DIAGNOSIS — L03.90 CELLULITIS, UNSPECIFIED: ICD-10-CM

## 2022-05-10 LAB
ALBUMIN SERPL ELPH-MCNC: 3.5 G/DL — SIGNIFICANT CHANGE UP (ref 3.3–5)
ALP SERPL-CCNC: 107 U/L — SIGNIFICANT CHANGE UP (ref 40–120)
ALT FLD-CCNC: 18 U/L — SIGNIFICANT CHANGE UP (ref 12–78)
ANION GAP SERPL CALC-SCNC: 4 MMOL/L — LOW (ref 5–17)
APPEARANCE UR: CLEAR — SIGNIFICANT CHANGE UP
APTT BLD: 29 SEC — SIGNIFICANT CHANGE UP (ref 27.5–35.5)
AST SERPL-CCNC: 13 U/L — LOW (ref 15–37)
BASOPHILS # BLD AUTO: 0.05 K/UL — SIGNIFICANT CHANGE UP (ref 0–0.2)
BASOPHILS NFR BLD AUTO: 0.6 % — SIGNIFICANT CHANGE UP (ref 0–2)
BILIRUB SERPL-MCNC: 0.5 MG/DL — SIGNIFICANT CHANGE UP (ref 0.2–1.2)
BILIRUB UR-MCNC: NEGATIVE — SIGNIFICANT CHANGE UP
BUN SERPL-MCNC: 20 MG/DL — SIGNIFICANT CHANGE UP (ref 7–23)
CALCIUM SERPL-MCNC: 9.1 MG/DL — SIGNIFICANT CHANGE UP (ref 8.5–10.1)
CHLORIDE SERPL-SCNC: 105 MMOL/L — SIGNIFICANT CHANGE UP (ref 96–108)
CO2 SERPL-SCNC: 29 MMOL/L — SIGNIFICANT CHANGE UP (ref 22–31)
COLOR SPEC: YELLOW — SIGNIFICANT CHANGE UP
CREAT SERPL-MCNC: 1.1 MG/DL — SIGNIFICANT CHANGE UP (ref 0.5–1.3)
DIFF PNL FLD: ABNORMAL
EGFR: 79 ML/MIN/1.73M2 — SIGNIFICANT CHANGE UP
EOSINOPHIL # BLD AUTO: 0.29 K/UL — SIGNIFICANT CHANGE UP (ref 0–0.5)
EOSINOPHIL NFR BLD AUTO: 3.6 % — SIGNIFICANT CHANGE UP (ref 0–6)
ERYTHROCYTE [SEDIMENTATION RATE] IN BLOOD: 25 MM/HR — HIGH (ref 0–20)
FLUAV AG NPH QL: SIGNIFICANT CHANGE UP
FLUBV AG NPH QL: SIGNIFICANT CHANGE UP
GLUCOSE SERPL-MCNC: 195 MG/DL — HIGH (ref 70–99)
GLUCOSE UR QL: 100 MG/DL
HCT VFR BLD CALC: 43.1 % — SIGNIFICANT CHANGE UP (ref 39–50)
HGB BLD-MCNC: 14.3 G/DL — SIGNIFICANT CHANGE UP (ref 13–17)
IMM GRANULOCYTES NFR BLD AUTO: 0.5 % — SIGNIFICANT CHANGE UP (ref 0–1.5)
INR BLD: 1.03 RATIO — SIGNIFICANT CHANGE UP (ref 0.88–1.16)
KETONES UR-MCNC: ABNORMAL
LACTATE SERPL-SCNC: 0.8 MMOL/L — SIGNIFICANT CHANGE UP (ref 0.7–2)
LEUKOCYTE ESTERASE UR-ACNC: NEGATIVE — SIGNIFICANT CHANGE UP
LYMPHOCYTES # BLD AUTO: 1.13 K/UL — SIGNIFICANT CHANGE UP (ref 1–3.3)
LYMPHOCYTES # BLD AUTO: 14.2 % — SIGNIFICANT CHANGE UP (ref 13–44)
MCHC RBC-ENTMCNC: 28.4 PG — SIGNIFICANT CHANGE UP (ref 27–34)
MCHC RBC-ENTMCNC: 33.2 GM/DL — SIGNIFICANT CHANGE UP (ref 32–36)
MCV RBC AUTO: 85.5 FL — SIGNIFICANT CHANGE UP (ref 80–100)
MONOCYTES # BLD AUTO: 0.55 K/UL — SIGNIFICANT CHANGE UP (ref 0–0.9)
MONOCYTES NFR BLD AUTO: 6.9 % — SIGNIFICANT CHANGE UP (ref 2–14)
NEUTROPHILS # BLD AUTO: 5.92 K/UL — SIGNIFICANT CHANGE UP (ref 1.8–7.4)
NEUTROPHILS NFR BLD AUTO: 74.2 % — SIGNIFICANT CHANGE UP (ref 43–77)
NITRITE UR-MCNC: NEGATIVE — SIGNIFICANT CHANGE UP
NRBC # BLD: 0 /100 WBCS — SIGNIFICANT CHANGE UP (ref 0–0)
PH UR: 5 — SIGNIFICANT CHANGE UP (ref 5–8)
PLATELET # BLD AUTO: 245 K/UL — SIGNIFICANT CHANGE UP (ref 150–400)
POTASSIUM SERPL-MCNC: 4.3 MMOL/L — SIGNIFICANT CHANGE UP (ref 3.5–5.3)
POTASSIUM SERPL-SCNC: 4.3 MMOL/L — SIGNIFICANT CHANGE UP (ref 3.5–5.3)
PREALB SERPL-MCNC: 20 MG/DL — SIGNIFICANT CHANGE UP (ref 20–40)
PROT SERPL-MCNC: 8 G/DL — SIGNIFICANT CHANGE UP (ref 6–8.3)
PROT UR-MCNC: 30 MG/DL
PROTHROM AB SERPL-ACNC: 12 SEC — SIGNIFICANT CHANGE UP (ref 10.5–13.4)
RBC # BLD: 5.04 M/UL — SIGNIFICANT CHANGE UP (ref 4.2–5.8)
RBC # FLD: 14.7 % — HIGH (ref 10.3–14.5)
RSV RNA NPH QL NAA+NON-PROBE: SIGNIFICANT CHANGE UP
SARS-COV-2 RNA SPEC QL NAA+PROBE: SIGNIFICANT CHANGE UP
SODIUM SERPL-SCNC: 138 MMOL/L — SIGNIFICANT CHANGE UP (ref 135–145)
SP GR SPEC: 1.01 — SIGNIFICANT CHANGE UP (ref 1.01–1.02)
UROBILINOGEN FLD QL: NEGATIVE — SIGNIFICANT CHANGE UP
WBC # BLD: 7.98 K/UL — SIGNIFICANT CHANGE UP (ref 3.8–10.5)
WBC # FLD AUTO: 7.98 K/UL — SIGNIFICANT CHANGE UP (ref 3.8–10.5)

## 2022-05-10 PROCEDURE — 99285 EMERGENCY DEPT VISIT HI MDM: CPT | Mod: FS

## 2022-05-10 PROCEDURE — 99222 1ST HOSP IP/OBS MODERATE 55: CPT

## 2022-05-10 PROCEDURE — 73630 X-RAY EXAM OF FOOT: CPT | Mod: 26,RT

## 2022-05-10 PROCEDURE — 93971 EXTREMITY STUDY: CPT | Mod: 26,RT

## 2022-05-10 PROCEDURE — 71045 X-RAY EXAM CHEST 1 VIEW: CPT | Mod: 26

## 2022-05-10 PROCEDURE — 99221 1ST HOSP IP/OBS SF/LOW 40: CPT | Mod: 57

## 2022-05-10 RX ORDER — SODIUM CHLORIDE 9 MG/ML
1000 INJECTION, SOLUTION INTRAVENOUS
Refills: 0 | Status: DISCONTINUED | OUTPATIENT
Start: 2022-05-10 | End: 2022-05-11

## 2022-05-10 RX ORDER — VANCOMYCIN HCL 1 G
1000 VIAL (EA) INTRAVENOUS ONCE
Refills: 0 | Status: COMPLETED | OUTPATIENT
Start: 2022-05-10 | End: 2022-05-10

## 2022-05-10 RX ORDER — TACROLIMUS 5 MG/1
1.5 CAPSULE ORAL
Qty: 0 | Refills: 0 | DISCHARGE

## 2022-05-10 RX ORDER — GABAPENTIN 400 MG/1
300 CAPSULE ORAL
Refills: 0 | Status: DISCONTINUED | OUTPATIENT
Start: 2022-05-10 | End: 2022-05-11

## 2022-05-10 RX ORDER — PIPERACILLIN AND TAZOBACTAM 4; .5 G/20ML; G/20ML
3.38 INJECTION, POWDER, LYOPHILIZED, FOR SOLUTION INTRAVENOUS EVERY 8 HOURS
Refills: 0 | Status: DISCONTINUED | OUTPATIENT
Start: 2022-05-10 | End: 2022-05-11

## 2022-05-10 RX ORDER — AZATHIOPRINE 100 MG/1
100 TABLET ORAL DAILY
Refills: 0 | Status: DISCONTINUED | OUTPATIENT
Start: 2022-05-10 | End: 2022-05-11

## 2022-05-10 RX ORDER — DEXTROSE 50 % IN WATER 50 %
12.5 SYRINGE (ML) INTRAVENOUS ONCE
Refills: 0 | Status: DISCONTINUED | OUTPATIENT
Start: 2022-05-10 | End: 2022-05-11

## 2022-05-10 RX ORDER — ATORVASTATIN CALCIUM 80 MG/1
40 TABLET, FILM COATED ORAL AT BEDTIME
Refills: 0 | Status: DISCONTINUED | OUTPATIENT
Start: 2022-05-10 | End: 2022-05-11

## 2022-05-10 RX ORDER — ONDANSETRON 8 MG/1
4 TABLET, FILM COATED ORAL EVERY 6 HOURS
Refills: 0 | Status: DISCONTINUED | OUTPATIENT
Start: 2022-05-10 | End: 2022-05-11

## 2022-05-10 RX ORDER — PIPERACILLIN AND TAZOBACTAM 4; .5 G/20ML; G/20ML
3.38 INJECTION, POWDER, LYOPHILIZED, FOR SOLUTION INTRAVENOUS ONCE
Refills: 0 | Status: COMPLETED | OUTPATIENT
Start: 2022-05-10 | End: 2022-05-10

## 2022-05-10 RX ORDER — LANOLIN ALCOHOL/MO/W.PET/CERES
3 CREAM (GRAM) TOPICAL AT BEDTIME
Refills: 0 | Status: DISCONTINUED | OUTPATIENT
Start: 2022-05-10 | End: 2022-05-11

## 2022-05-10 RX ORDER — DEXTROSE 50 % IN WATER 50 %
25 SYRINGE (ML) INTRAVENOUS ONCE
Refills: 0 | Status: DISCONTINUED | OUTPATIENT
Start: 2022-05-10 | End: 2022-05-11

## 2022-05-10 RX ORDER — ACETAMINOPHEN 500 MG
650 TABLET ORAL EVERY 6 HOURS
Refills: 0 | Status: DISCONTINUED | OUTPATIENT
Start: 2022-05-10 | End: 2022-05-11

## 2022-05-10 RX ORDER — TACROLIMUS 5 MG/1
1 CAPSULE ORAL
Refills: 0 | Status: DISCONTINUED | OUTPATIENT
Start: 2022-05-10 | End: 2022-05-11

## 2022-05-10 RX ORDER — DEXTROSE 50 % IN WATER 50 %
15 SYRINGE (ML) INTRAVENOUS ONCE
Refills: 0 | Status: DISCONTINUED | OUTPATIENT
Start: 2022-05-10 | End: 2022-05-11

## 2022-05-10 RX ORDER — INSULIN LISPRO 100/ML
VIAL (ML) SUBCUTANEOUS AT BEDTIME
Refills: 0 | Status: DISCONTINUED | OUTPATIENT
Start: 2022-05-10 | End: 2022-05-11

## 2022-05-10 RX ORDER — METOPROLOL TARTRATE 50 MG
75 TABLET ORAL EVERY 12 HOURS
Refills: 0 | Status: DISCONTINUED | OUTPATIENT
Start: 2022-05-10 | End: 2022-05-11

## 2022-05-10 RX ORDER — ASPIRIN/CALCIUM CARB/MAGNESIUM 324 MG
1 TABLET ORAL
Qty: 0 | Refills: 0 | DISCHARGE

## 2022-05-10 RX ORDER — HYDRALAZINE HCL 50 MG
25 TABLET ORAL THREE TIMES A DAY
Refills: 0 | Status: DISCONTINUED | OUTPATIENT
Start: 2022-05-10 | End: 2022-05-11

## 2022-05-10 RX ORDER — ASPIRIN/CALCIUM CARB/MAGNESIUM 324 MG
162 TABLET ORAL DAILY
Refills: 0 | Status: DISCONTINUED | OUTPATIENT
Start: 2022-05-10 | End: 2022-05-11

## 2022-05-10 RX ORDER — TACROLIMUS 5 MG/1
0.5 CAPSULE ORAL
Refills: 0 | Status: DISCONTINUED | OUTPATIENT
Start: 2022-05-10 | End: 2022-05-11

## 2022-05-10 RX ORDER — INSULIN LISPRO 100/ML
VIAL (ML) SUBCUTANEOUS
Refills: 0 | Status: DISCONTINUED | OUTPATIENT
Start: 2022-05-10 | End: 2022-05-11

## 2022-05-10 RX ORDER — GLUCAGON INJECTION, SOLUTION 0.5 MG/.1ML
1 INJECTION, SOLUTION SUBCUTANEOUS ONCE
Refills: 0 | Status: DISCONTINUED | OUTPATIENT
Start: 2022-05-10 | End: 2022-05-11

## 2022-05-10 RX ORDER — LOSARTAN POTASSIUM 100 MG/1
25 TABLET, FILM COATED ORAL DAILY
Refills: 0 | Status: DISCONTINUED | OUTPATIENT
Start: 2022-05-10 | End: 2022-05-11

## 2022-05-10 RX ORDER — FAMOTIDINE 10 MG/ML
1 INJECTION INTRAVENOUS
Qty: 0 | Refills: 0 | DISCHARGE

## 2022-05-10 RX ADMIN — TACROLIMUS 1 MILLIGRAM(S): 5 CAPSULE ORAL at 21:42

## 2022-05-10 RX ADMIN — Medication 3 MILLIGRAM(S): at 21:43

## 2022-05-10 RX ADMIN — Medication 75 MILLIGRAM(S): at 18:29

## 2022-05-10 RX ADMIN — ATORVASTATIN CALCIUM 40 MILLIGRAM(S): 80 TABLET, FILM COATED ORAL at 21:43

## 2022-05-10 RX ADMIN — GABAPENTIN 300 MILLIGRAM(S): 400 CAPSULE ORAL at 18:29

## 2022-05-10 RX ADMIN — PIPERACILLIN AND TAZOBACTAM 200 GRAM(S): 4; .5 INJECTION, POWDER, LYOPHILIZED, FOR SOLUTION INTRAVENOUS at 14:53

## 2022-05-10 RX ADMIN — Medication 0.1 MILLIGRAM(S): at 18:29

## 2022-05-10 RX ADMIN — PIPERACILLIN AND TAZOBACTAM 25 GRAM(S): 4; .5 INJECTION, POWDER, LYOPHILIZED, FOR SOLUTION INTRAVENOUS at 21:55

## 2022-05-10 RX ADMIN — TACROLIMUS 0.5 MILLIGRAM(S): 5 CAPSULE ORAL at 21:42

## 2022-05-10 RX ADMIN — Medication 250 MILLIGRAM(S): at 15:59

## 2022-05-10 RX ADMIN — Medication 25 MILLIGRAM(S): at 21:42

## 2022-05-10 RX ADMIN — PIPERACILLIN AND TAZOBACTAM 3.38 GRAM(S): 4; .5 INJECTION, POWDER, LYOPHILIZED, FOR SOLUTION INTRAVENOUS at 13:23

## 2022-05-10 NOTE — ED PROVIDER NOTE - CLINICAL SUMMARY MEDICAL DECISION MAKING FREE TEXT BOX
DT: I have personally performed a face to face diagnostic evaluation on this patient.  I have reviewed the PA's note and agree with the history, exam, and plan of care, except as noted.  History and Exam by me shows 55 y/o obese M with PMH HTN, HLD, DM, DVT, neuropathy, presents to ED form wound car clinic. Pt follows with Dr Catherine (podairy). Has had daibetic foot wound since December. bene going for weekly treatment. Today woke up and R foot swollen painful. Denies fever and chills. Called Podiatry and was seen in office today and advised to come to ED for IV abx. pt does have exposed bone on lateral aspect or R foot and was planning to undergo "bone shaving" next week. Pt denies n/v/d cough CP SOB or any other symptoms .  Patient is NAD.  A n O x 3. Head NC/AT.  Right foot- dressed by podiatry and deferred to be examined by patient.

## 2022-05-10 NOTE — PATIENT PROFILE ADULT - FALL HARM RISK - HARM RISK INTERVENTIONS

## 2022-05-10 NOTE — H&P ADULT - PROBLEM SELECTOR PLAN 1
Admit  Pan-culture  Check procalcitonin, ESR, CRP  Patient says he cannot get MRI  IV abx as per ID recommendations  MAY PROCEED TO O.R. FOR PLANNED SURGERY  Podiatry/ID/Cardio consults  Further work-up/management pending clinical course.

## 2022-05-10 NOTE — H&P ADULT - NSICDXPASTMEDICALHX_GEN_ALL_CORE_FT
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

## 2022-05-10 NOTE — CONSULT NOTE ADULT - ASSESSMENT
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  Planned for right foot 5th metatarsal head resection & 5th metatarsal base exostectomy in am, 5/11.     Right foot ulcer, OM?  - Podia eval noted.  Planned for right foot 5th metatarsal head resection & 5th metatarsal base exostectomy in am, 5/11.  - He had no known cardiac disease.  He has no cardiac arrhythmias, cardiac ischemic, severe cardiac murmur or evidence of volume overload.  He is considered optimized to undergo a low risk non-cardiac procedure.  - Please, obtain EKG  - Abx per ID    HTN  - Resume home BP meds with parameter    Monitor electrolytes, replete to keep K>4 and <Mag>2  Recommend Endo input re: Insulin pump    Will continue to follow     Miracle Fowler DNP, NP-C  Cardiology  Spectra #[phone]         Assessment/Plan:  This is a 55 y/o M with HTN, HLD, T1DM (on Insulin pump), CKD s/p renal transplant, CVA with residual, DVT s/p Eliquis, CARMEN (on nocturnal CPAP), Restrictive Lung Disease, squamous cell carcinoma OM Left 4th toe s/p amputation, and right 2nd toe amputation presented to the ED for right 5th toe ulcer.  Planned for right foot 5th metatarsal head resection & 5th metatarsal base exostectomy in am, 5/11.     Right foot ulcer, OM?  - Podia eval noted.  Planned for right foot 5th metatarsal head resection & 5th metatarsal base exostectomy in am, 5/11.  - He had no known cardiac disease.  He has no cardiac arrhythmias, cardiac ischemic, severe cardiac murmur or evidence of volume overload.  He is considered optimized to undergo a low risk non-cardiac procedure.  - Please, obtain EKG  - Abx per ID    HTN  - Resume home BP meds with parameter  - Euvolemic.  Had a normal TTE.  No need to repeat preop, unless, change in cardiac status    Monitor electrolytes, replete to keep K>4 and <Mag>2  Recommend Endo input re: Insulin pump    Will continue to follow     Miracle Fowler DNP, NP-C  Cardiology  Spectra #[phone]

## 2022-05-10 NOTE — ED PROVIDER NOTE - NS ED ATTENDING STATEMENT MOD
This was a shared visit with the LINA. I reviewed and verified the documentation and independently performed the documented:

## 2022-05-10 NOTE — H&P ADULT - EXTREMITIES COMMENTS
Right lateral 5th metatarsal head 1 x 1 x 0.2 cm and 5th metatarsal base 4x 4 x 0.3 cm ulcers. All wound beds granular, with no periwound erythema. No purulence, no undermining, probes to bone.

## 2022-05-10 NOTE — ED PROVIDER NOTE - PHYSICAL EXAMINATION
PE:   GEN: Awake, alert, interactive, NAD, non-toxic appearing.   HEAD: Atraumatic  EYES: Sclera white, conjunctiva pink, PERRL  CARDIAC: Reg rate and rhythm, S1,S2, no murmur/rub/gallop. Strong central and peripheral pulses, Brisk cap refill, REL 2+ edema   RESP: No distress noted. L/S clear = Bilat without accessory muscle use, wheeze, rhonchi, rales.   ABD: obese soft, supple, non-tender, no guarding. BS x 4, normoactive.   NEURO: AOx3, CN II-XII grossly intact without focal deficit.   MSK: Moving all extremities with no apparent deformities.   SKIN: erythema noted to anterior RLE extending to mid shin, warm to touch, no drainage, 3.5x3 cm round ulcer noted to plantar aspect of R foot with clean edges and red base, dry, no purulent drainage, 1.5x1cm wound noted to lateral aspect of R foot by the bsae of the 5th digit with clean edges red base no disharge or drainage

## 2022-05-10 NOTE — CONSULT NOTE ADULT - SUBJECTIVE AND OBJECTIVE BOX
[facility], Division of Infectious Diseases  MEL Mckoy S. Shah, Y. Patel, G. Lawrence  [phone]    LUTHER NORIEGA  56y, Male  [id number]    HPI--  HPI:        Active Medications--  vancomycin  IVPB 1000 milliGRAM(s) IV Intermittent once    Antimicrobials:   vancomycin  IVPB 1000 milliGRAM(s) IV Intermittent once    Immunologic:     ROS:  CONSTITUTIONAL: No fevers or chills. No weakness or headache. No weight changes.  EYES/ENT: No visual or hearing changes. No sore throat or throat pain .  NECK: No pain or stiffness  RESPIRATORY: No cough, wheezing, or hemoptysis. No shortness of breath  CARDIOVASCULAR: No chest pain or palpitations  GASTROINTESTINAL: No abdominal pain. No nausea or vomiting. No diarrhea or constipation.  GENITOURINARY: No dysuria, frequency or hematuria  NEUROLOGICAL: No numbness or weakness  SKIN: No itching or rashes  PSYCHIATRIC: Pleasant. Appropriate affect    Allergies: No Known Allergies    PMH -- Hypertension    Hyperlipidemia    Diabetes mellitus    CKD (chronic kidney disease)    Diabetic peripheral neuropathy    Obesity (BMI 30-39.9)    CVA (cerebral vascular accident)    Squamous cell carcinoma of skin of left ear    History of DVT (deep vein thrombosis)    Recurrent squamous cell carcinoma of skin      PSH -- Toe infection    Ear disease    Vasectomy status    H/O foot surgery    End-stage renal failure with renal transplant    S/P kidney transplant    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      FH -- No pertinent family history in first degree relatives    Family history of diabetes mellitus (DM)    FH: skin cancer      Social History --  EtOH: denies   Tobacco: denies   Drug Use: denies     Travel/Environmental/Occupational History:    Physical Exam--  Vital Signs Last 24 Hrs  T(F): 98 (10 May 2022 13:22), Max: 98 (10 May 2022 13:22)  HR: 85 (10 May 2022 13:22) (85 - 85)  BP: 149/75 (10 May 2022 13:22) (149/75 - 149/75)  RR: 18 (10 May 2022 13:22) (18 - 18)  SpO2: 99% (10 May 2022 13:22) (99% - 99%)  General: nontoxic-appearing, no acute distress  HEENT: NC/AT, EOMI, anicteric, conjunctiva pink and moist, oropharynx clear, dentition fair  Neck: Not rigid. No sense of mass. No LAD  Lungs: Clear bilaterally without rales, wheezing or rhonchi  Heart: Regular rate and rhythm. No murmur, rub or gallop.  Abdomen: Soft. Nondistended. Nontender. Bowel sounds present. No organomegaly.  Back: No spinal tenderness. No costovertebral angle tenderness.  Extremities: No cyanosis or clubbing. No edema.   Skin: Warm. Dry. Good turgor. No rash. No vasculitic stigmata.  Psychiatric: Appropriate affect and mood for situation.   Lines:    Laboratory & Imaging Data--  CBC:                       14.3   7.98  )-----------( 245      ( 10 May 2022 14:58 )             43.1     CMP:             Microbiology: reviewed      Radiology: reviewed     [facility], Division of Infectious Diseases  MEL Mckoy, CASH Ellis G. Lawrence  [phone]    LUTHER NORIEGA  56y, Male  [id number]    HPI--  HPI: This is a 55 y/o M with HTN, HLD, T1DM (on Insulin pump), CKD s/p renal transplant, CVA with residual, DVT s/p Eliquis, CARMEN (on nocturnal CPAP), Restrictive Lung Disease, squamous cell carcinoma OM Left 4th toe s/p amputation, and right 2nd toe amputation presented to the ED for right 5th toe ulcer.  Planned for right foot 5th metatarsal head resection & 5th metatarsal base exostectomy in am, 5/11.    ID c/s for further evaluation    S/p vancomycin/zosyn in the ED    Active Medications--    Antimicrobials:     Immunologic:     ROS:  CONSTITUTIONAL: No fevers or chills. No weakness or headache. No weight changes.  EYES/ENT: No visual or hearing changes. No sore throat or throat pain .  NECK: No pain or stiffness  RESPIRATORY: No cough, wheezing, or hemoptysis. No shortness of breath  CARDIOVASCULAR: No chest pain or palpitations  GASTROINTESTINAL: No abdominal pain. No nausea or vomiting. No diarrhea or constipation.  GENITOURINARY: No dysuria, frequency or hematuria  NEUROLOGICAL: No numbness or weakness  SKIN: No itching or rashes  PSYCHIATRIC: Pleasant. Appropriate affect    Allergies: No Known Allergies    PMH -- Hypertension    Hyperlipidemia    Diabetes mellitus    CKD (chronic kidney disease)    Diabetic peripheral neuropathy    Obesity (BMI 30-39.9)    CVA (cerebral vascular accident)    Squamous cell carcinoma of skin of left ear    History of DVT (deep vein thrombosis)    Recurrent squamous cell carcinoma of skin      PSH -- Toe infection    Ear disease    Vasectomy status    H/O foot surgery    End-stage renal failure with renal transplant    S/P kidney transplant    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      FH -- No pertinent family history in first degree relatives    Family history of diabetes mellitus (DM)    FH: skin cancer      Social History --  EtOH: denies   Tobacco: denies   Drug Use: denies     Travel/Environmental/Occupational History:    Physical Exam--  Vital Signs Last 24 Hrs  T(F): 98 (10 May 2022 13:22), Max: 98 (10 May 2022 13:22)  HR: 85 (10 May 2022 13:22) (85 - 85)  BP: 149/75 (10 May 2022 13:22) (149/75 - 149/75)  RR: 18 (10 May 2022 13:22) (18 - 18)  SpO2: 99% (10 May 2022 13:22) (99% - 99%)  General: nontoxic-appearing, no acute distress  HEENT: NC/AT, EOMI, anicteric, conjunctiva pink and moist, oropharynx clear, dentition fair  Neck: Not rigid. No sense of mass. No LAD  Lungs: Clear bilaterally without rales, wheezing or rhonchi  Heart: Regular rate and rhythm. No murmur, rub or gallop.  Abdomen: Soft. Nondistended. Nontender. Bowel sounds present.   Extremities: No cyanosis or clubbing. No edema.   Skin: Warm. Dry. Good turgor. No rash. No vasculitic stigmata.    Laboratory & Imaging Data--  CBC:                       14.3   7.98  )-----------( 245      ( 10 May 2022 14:58 )             43.1     CMP: 05-10    138  |  105  |  20  ----------------------------<  195<H>  4.3   |  29  |  1.10    Ca    9.1      10 May 2022 14:58    TPro  8.0  /  Alb  3.5  /  TBili  0.5  /  DBili  x   /  AST  13<L>  /  ALT  18  /  AlkPhos  107  05-10    LIVER FUNCTIONS - ( 10 May 2022 14:58 )  Alb: 3.5 g/dL / Pro: 8.0 g/dL / ALK PHOS: 107 U/L / ALT: 18 U/L / AST: 13 U/L / GGT: x               Microbiology: reviewed      Radiology: reviewed    < from: US Duplex Venous Lower Ext Ltd, Right (05.10.22 @ 15:48) >    ACC: [id number] EXAM:  US DPLX LWR EXT VEINS LTD RT                          PROCEDURE DATE:  05/10/2022          INTERPRETATION:  CLINICAL INFORMATION: Leg swelling    COMPARISON: Bilateral lower extremity Doppler ultrasound 12/17/2019    TECHNIQUE:Duplex sonography of the RIGHT LOWER extremity veins with   color and spectral Doppler, with and without compression.    FINDINGS: Prominent right groin lymph nodes measuring under 1.5 cm short   axis    There is normal compressibility of the right common femoral, femoral and   popliteal veins.  The contralateral common femoral vein is patent.  Doppler examination shows normal spontaneous and phasic flow.    Calf veins were not able to be diagnostically visualized.    IMPRESSION:  No evidence of right lower extremity deep venous thrombosis involving the   venous segments able to be examined.          --- End of Report ---             JARVIS GILMAN MD; Attending Radiologist  This document has been electronically signed. May 10 2022  3:53PM    < end of copied text >     [facility], Division of Infectious Diseases  MEL Mckoy, CASH Ellis G. Lawrence  [phone]    LUTHER NORIEGA  56y, Male  [id number]    HPI--  HPI: This is a 57 y/o M with HTN, HLD, T1DM (on Insulin pump), CKD s/p renal transplant, CVA with residual, DVT s/p Eliquis, CARMEN (on nocturnal CPAP), Restrictive Lung Disease, squamous cell carcinoma OM Left 4th toe s/p amputation, and right 2nd toe amputation presented to the ED for right 5th toe ulcer.  Planned for right foot 5th metatarsal head resection & 5th metatarsal base exostectomy in am, 5/11.    ID c/s for further evaluation  S/p vancomycin/zosyn in the ED    Active Medications--    Antimicrobials:     Immunologic:     ROS:  CONSTITUTIONAL: No fevers or chills. No weakness or headache. No weight changes.  EYES/ENT: No visual or hearing changes. No sore throat or throat pain .  NECK: No pain or stiffness  RESPIRATORY: No cough, wheezing, or hemoptysis. No shortness of breath  CARDIOVASCULAR: No chest pain or palpitations  GASTROINTESTINAL: No abdominal pain. No nausea or vomiting. No diarrhea or constipation.  GENITOURINARY: No dysuria, frequency or hematuria  NEUROLOGICAL: No numbness or weakness  SKIN: No itching or rashes  PSYCHIATRIC: Pleasant. Appropriate affect    Allergies: No Known Allergies    PMH -- Hypertension    Hyperlipidemia    Diabetes mellitus    CKD (chronic kidney disease)    Diabetic peripheral neuropathy    Obesity (BMI 30-39.9)    CVA (cerebral vascular accident)    Squamous cell carcinoma of skin of left ear    History of DVT (deep vein thrombosis)    Recurrent squamous cell carcinoma of skin      PSH -- Toe infection    Ear disease    Vasectomy status    H/O foot surgery    End-stage renal failure with renal transplant    S/P kidney transplant    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      FH -- No pertinent family history in first degree relatives    Family history of diabetes mellitus (DM)    FH: skin cancer      Social History --  EtOH: denies   Tobacco: denies   Drug Use: denies     Travel/Environmental/Occupational History:    Physical Exam--  Vital Signs Last 24 Hrs  T(F): 98 (10 May 2022 13:22), Max: 98 (10 May 2022 13:22)  HR: 85 (10 May 2022 13:22) (85 - 85)  BP: 149/75 (10 May 2022 13:22) (149/75 - 149/75)  RR: 18 (10 May 2022 13:22) (18 - 18)  SpO2: 99% (10 May 2022 13:22) (99% - 99%)  General: nontoxic-appearing, no acute distress  HEENT: NC/AT, EOMI, anicteric, conjunctiva pink and moist, oropharynx clear, dentition fair  Neck: Not rigid. No sense of mass. No LAD  Lungs: Clear bilaterally without rales, wheezing or rhonchi  Heart: Regular rate and rhythm. No murmur, rub or gallop.  Abdomen: Soft. Nondistended. Nontender. Bowel sounds present.   Extremities: No cyanosis or clubbing. No edema.   Skin: Warm. Dry. Good turgor. No rash. No vasculitic stigmata.    Laboratory & Imaging Data--  CBC:                       14.3   7.98  )-----------( 245      ( 10 May 2022 14:58 )             43.1     CMP: 05-10    138  |  105  |  20  ----------------------------<  195<H>  4.3   |  29  |  1.10    Ca    9.1      10 May 2022 14:58    TPro  8.0  /  Alb  3.5  /  TBili  0.5  /  DBili  x   /  AST  13<L>  /  ALT  18  /  AlkPhos  107  05-10    LIVER FUNCTIONS - ( 10 May 2022 14:58 )  Alb: 3.5 g/dL / Pro: 8.0 g/dL / ALK PHOS: 107 U/L / ALT: 18 U/L / AST: 13 U/L / GGT: x               Microbiology: reviewed      Radiology: reviewed    < from: US Duplex Venous Lower Ext Ltd, Right (05.10.22 @ 15:48) >    ACC: [id number] EXAM:  US DPLX LWR EXT VEINS LTD RT                          PROCEDURE DATE:  05/10/2022          INTERPRETATION:  CLINICAL INFORMATION: Leg swelling    COMPARISON: Bilateral lower extremity Doppler ultrasound 12/17/2019    TECHNIQUE:Duplex sonography of the RIGHT LOWER extremity veins with   color and spectral Doppler, with and without compression.    FINDINGS: Prominent right groin lymph nodes measuring under 1.5 cm short   axis    There is normal compressibility of the right common femoral, femoral and   popliteal veins.  The contralateral common femoral vein is patent.  Doppler examination shows normal spontaneous and phasic flow.    Calf veins were not able to be diagnostically visualized.    IMPRESSION:  No evidence of right lower extremity deep venous thrombosis involving the   venous segments able to be examined.          --- End of Report ---             JARVIS GILMAN MD; Attending Radiologist  This document has been electronically signed. May 10 2022  3:53PM    < end of copied text >     [facility], Division of Infectious Diseases  MEL Mckoy, CASH Ellis G. Lawrence  [phone]    LUTHER NORIEGA  56y, Male  [id number]    HPI--  HPI: This is a 57 y/o M with HTN, HLD, T1DM (on Insulin pump), CKD s/p renal transplant, CVA with residual, DVT s/p Eliquis, CARMEN (on nocturnal CPAP), Restrictive Lung Disease, squamous cell carcinoma OM Left 4th toe s/p amputation, and right 2nd toe amputation presented to the ED for right 5th toe ulcer.  Planned for right foot 5th metatarsal head resection & 5th metatarsal base exostectomy in am, 5/11.    ID c/s for further evaluation  S/p vancomycin/zosyn in the ED    Pt w/o current complaints. Denies f/c/n/v/d.     Active Medications--    Antimicrobials:     Immunologic:     ROS:  CONSTITUTIONAL: No fevers or chills. No weakness or headache. No weight changes.  EYES/ENT: No visual or hearing changes. No sore throat or throat pain .  NECK: No pain or stiffness  RESPIRATORY: No cough, wheezing, or hemoptysis. No shortness of breath  CARDIOVASCULAR: No chest pain or palpitations  GASTROINTESTINAL: No abdominal pain. No nausea or vomiting. No diarrhea or constipation.  GENITOURINARY: No dysuria, frequency or hematuria  NEUROLOGICAL: No numbness or weakness  SKIN: No itching or rashes  PSYCHIATRIC: Pleasant. Appropriate affect    Allergies: No Known Allergies    PMH -- Hypertension    Hyperlipidemia    Diabetes mellitus    CKD (chronic kidney disease)    Diabetic peripheral neuropathy    Obesity (BMI 30-39.9)    CVA (cerebral vascular accident)    Squamous cell carcinoma of skin of left ear    History of DVT (deep vein thrombosis)    Recurrent squamous cell carcinoma of skin      PSH -- Toe infection    Ear disease    Vasectomy status    H/O foot surgery    End-stage renal failure with renal transplant    S/P kidney transplant    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      FH -- No pertinent family history in first degree relatives    Family history of diabetes mellitus (DM)    FH: skin cancer      Social History --  EtOH: denies   Tobacco: denies   Drug Use: denies     Travel/Environmental/Occupational History:    Physical Exam--  Vital Signs Last 24 Hrs  T(F): 98 (10 May 2022 13:22), Max: 98 (10 May 2022 13:22)  HR: 85 (10 May 2022 13:22) (85 - 85)  BP: 149/75 (10 May 2022 13:22) (149/75 - 149/75)  RR: 18 (10 May 2022 13:22) (18 - 18)  SpO2: 99% (10 May 2022 13:22) (99% - 99%)  General: nontoxic-appearing, no acute distress  HEENT: NC/AT, EOMI, anicteric, c  Lungs: Clear bilaterally without rales, wheezing or rhonchi  Heart: Regular rate and rhythm. No murmur, rub or gallop.  Abdomen: Soft. Nondistended. Nontender.  Extremities: R foot dressed, c/d/i, 2 ulcers, no drainage  Skin: Warm. Dry. Good turgor. No rash. No vasculitic stigmata.    Laboratory & Imaging Data--  CBC:                       14.3   7.98  )-----------( 245      ( 10 May 2022 14:58 )             43.1     CMP: 05-10    138  |  105  |  20  ----------------------------<  195<H>  4.3   |  29  |  1.10    Ca    9.1      10 May 2022 14:58    TPro  8.0  /  Alb  3.5  /  TBili  0.5  /  DBili  x   /  AST  13<L>  /  ALT  18  /  AlkPhos  107  05-10    LIVER FUNCTIONS - ( 10 May 2022 14:58 )  Alb: 3.5 g/dL / Pro: 8.0 g/dL / ALK PHOS: 107 U/L / ALT: 18 U/L / AST: 13 U/L / GGT: x               Microbiology: reviewed      Radiology: reviewed    < from: US Duplex Venous Lower Ext Ltd, Right (05.10.22 @ 15:48) >    ACC: [id number] EXAM:  US DPLX LWR EXT VEINS LTD RT                          PROCEDURE DATE:  05/10/2022          INTERPRETATION:  CLINICAL INFORMATION: Leg swelling    COMPARISON: Bilateral lower extremity Doppler ultrasound 12/17/2019    TECHNIQUE:Duplex sonography of the RIGHT LOWER extremity veins with   color and spectral Doppler, with and without compression.    FINDINGS: Prominent right groin lymph nodes measuring under 1.5 cm short   axis    There is normal compressibility of the right common femoral, femoral and   popliteal veins.  The contralateral common femoral vein is patent.  Doppler examination shows normal spontaneous and phasic flow.    Calf veins were not able to be diagnostically visualized.    IMPRESSION:  No evidence of right lower extremity deep venous thrombosis involving the   venous segments able to be examined.          --- End of Report ---             JARVIS GILMAN MD; Attending Radiologist  This document has been electronically signed. May 10 2022  3:53PM    < end of copied text >

## 2022-05-10 NOTE — CONSULT NOTE ADULT - ASSESSMENT
Problem: Diabetic foot ulcer with osteomyelitis.   ·  Recommendation: Pt seen and evaluated  - Please admit the patient for IV antibiotics and plan for Right foot 5th metatarsal head resection & 5th metatarsal base exostectomy tomorrow with Dr. Catherine at 5pm   - Please obtain x-rays, MRI, and labs  - Please obtain Medical and Cardiac clearance  - Please consult ID and nephrology.     Diabetic foot ulcer c/b OM  Planned for Right foot 5th metatarsal head resection & 5th metatarsal base exostectomy tomorrow with Dr. Catherine at 5pm     Full note to follow    Infectious Diseases will continue to follow. Please call with any questions.   Claire Castro M.D.  [facility], Division of Infectious Diseases [phone]     This is a 55 y/o M with HTN, HLD, T1DM (on Insulin pump), CKD s/p renal transplant, CVA with residual, DVT s/p Eliquis, CARMEN (on nocturnal CPAP), Restrictive Lung Disease, squamous cell carcinoma OM Left 4th toe s/p amputation, and right 2nd toe amputation presented to the ED for right 5th toe ulcer.  Planned for right foot 5th metatarsal head resection & 5th metatarsal base exostectomy in am, 5/11.    R 5th toe DFU/OM  - seen by podiatry; Planned for Right foot 5th metatarsal head resection & 5th metatarsal base exostectomy tomorrow with Dr. Catherine at 5pm   - infectious w/u pending    --BCx ordered  - imaging reviewed    -- DVT study negative   -- MRI pending  Plan:   C/w zosyn  Appreciate podiatry recs  F/u cx  Please send TCx/path in OR tomorrow    Infectious Diseases will continue to follow. Please call with any questions.   Claire Castro M.D.  [facility], Division of Infectious Diseases [phone]     This is a 57 y/o M with HTN, HLD, T1DM (on Insulin pump), CKD s/p renal transplant, CVA with residual, DVT s/p Eliquis, CARMEN (on nocturnal CPAP), Restrictive Lung Disease, squamous cell carcinoma OM Left 4th toe s/p amputation, and right 2nd toe amputation presented to the ED for right 5th toe ulcer.  Planned for right foot 5th metatarsal head resection & 5th metatarsal base exostectomy in am, 5/11.    R 5th toe DFU/OM  - seen by podiatry; Planned for Right foot 5th metatarsal head resection & 5th metatarsal base exostectomy tomorrow with Dr. Catherine at 5pm   - infectious w/u pending    --BCx pending  - imaging reviewed    -- DVT study negative   -- MRI pending  Plan:   C/w zosyn  Appreciate podiatry recs  F/u cx  Please send TCx/path in OR tomorrow    Hx of renal txp  -c/w bactrim for PJP PPx    Infectious Diseases will continue to follow. Please call with any questions.   Claire Castro M.D.  [facility], Division of Infectious Diseases [phone]

## 2022-05-10 NOTE — ED ADULT NURSE NOTE - OBJECTIVE STATEMENT
Pt sent in by [facility]ound care center for right foot diabetic ulcer infection, abx and admission. Pt states he noticed his right foot swelling and warm to touch this morning. Pt sent in by Wound care center for right foot diabetic ulcer infection, abx and admission. Pt states he noticed his right foot swelling and warm to touch this morning. No other complaints. Safety maintained, call bell within reach, Nursing care ongoing.

## 2022-05-10 NOTE — CONSULT NOTE ADULT - NS ATTEND AMEND GEN_ALL_CORE FT
-there is no evidence of acute ischemia.  -there is no evidence of significant arrhythmia.  -there is no evidence for meaningful  volume overload.  -planned for pod procedure  -ekg pending  -if ekg without meangingful abn, would consider him optimized for the planned procedure

## 2022-05-10 NOTE — CONSULT NOTE ADULT - PROBLEM SELECTOR RECOMMENDATION 9
Pt seen and evaluated  - Please admit the patient for IV antibiotics and plan for Right foot 5th metatarsal head resection & 5th metatarsal base exostectomy tomorrow with Dr. Catherine at 5pm   - Please obtain x-rays, MRI, and labs  - Please obtain Medical and Cardiac clearance  - Please consult ID and nephrology Pt seen and evaluated  - Please admit the patient for IV antibiotics and plan for Right foot 5th metatarsal head resection & 5th metatarsal base exostectomy tomorrow with Dr. Catherine at 5pm   - Please keep pt NPO after midnight  - Right foot  x-rays - no fracture, dislocation noted  - No MRI at this time. Pt has metal in his cranium  - Per ID, continue Zosyn  - Cardiac clearance appreciated  - Please provide Medical clearance  - Please consult nephrology Pt seen and evaluated  - Please admit the patient for IV antibiotics   - Given the patient's symptoms, will plan for Right foot 5th metatarsal head resection & 5th metatarsal base exostectomy tomorrow with Dr. Catherine at 5pm  - Discussed risks, benefits, and potential complications with patient and family members regarding surgical intervention including sepsis, further loss of limb, and loss of life. All questions answered to satisfaction at this time.   - Please keep pt NPO after midnight  - Right foot  x-rays - no fracture, dislocation noted  - No MRI at this time. Pt has metal in his cranium  - Per ID, continue Zosyn  - Cardiac clearance appreciated  - Please provide Medical clearance  - Please consult nephrology

## 2022-05-10 NOTE — H&P ADULT - HISTORY OF PRESENT ILLNESS
This is a 55 y/o M with HTN, HLD, T1DM (on Insulin pump), CKD s/p renal transplant, CVA with residual, DVT s/p Eliquis, CARMEN (on nocturnal CPAP), Restrictive Lung Disease, squamous cell carcinoma OM Left 4th toe s/p amputation, and right 2nd toe amputation presented to the ED for right 5th toe ulcer.  Planned for right foot 5th metatarsal head resection & 5th metatarsal base exostectomy in am, 5/11.

## 2022-05-10 NOTE — ED PROVIDER NOTE - OBJECTIVE STATEMENT
57 y/o obese M with PMH HTN, HLD, DM, DVT, neuropathy, presents to ED form wound car clinic. Pt follows with Dr Catherine (podairy). Has had daibetic foot wound since December. bene going for weekly treatment. Today woke up and R foot swollen painful. Denies fever and chills. Called Podiatry and was seen in office today and advised to come to ED for IV abx. pt does have exposed bone on lateral aspect or R foot and was planning to undergo "bone shaving" next week. Pt denies n/v/d cough CP SOB or any other symptoms      Not vaccinated for COVID

## 2022-05-10 NOTE — CONSULT NOTE ADULT - SUBJECTIVE AND OBJECTIVE BOX
56y year old Male seen at [facility] ED for ----------.  Denies any fever, chills, nausea, vomiting, chest pain, shortness of breath, or calf pain at this time.    Allergies    No Known Allergies    Intolerances        MEDICATIONS  (STANDING):  vancomycin  IVPB 1000 milliGRAM(s) IV Intermittent once    MEDICATIONS  (PRN):      Vital Signs Last 24 Hrs  T(C): 36.7 (10 May 2022 13:22), Max: 36.7 (10 May 2022 13:22)  T(F): 98 (10 May 2022 13:22), Max: 98 (10 May 2022 13:22)  HR: 85 (10 May 2022 13:22) (85 - 85)  BP: 149/75 (10 May 2022 13:22) (149/75 - 149/75)  BP(mean): --  RR: 18 (10 May 2022 13:22) (18 - 18)  SpO2: 99% (10 May 2022 13:22) (99% - 99%)    PHYSICAL EXAM:  Vascular: ----------  Neurological: ----------  Musculoskeletal: ----------  Dermatological: ----------    CBC Full  -  ( 10 May 2022 14:58 )  WBC Count : 7.98 K/uL  RBC Count : 5.04 M/uL  Hemoglobin : 14.3 g/dL  Hematocrit : 43.1 %  Platelet Count - Automated : 245 K/uL  Mean Cell Volume : 85.5 fl  Mean Cell Hemoglobin : 28.4 pg  Mean Cell Hemoglobin Concentration : 33.2 gm/dL  Auto Neutrophil # : 5.92 K/uL  Auto Lymphocyte # : 1.13 K/uL  Auto Monocyte # : 0.55 K/uL  Auto Eosinophil # : 0.29 K/uL  Auto Basophil # : 0.05 K/uL  Auto Neutrophil % : 74.2 %  Auto Lymphocyte % : 14.2 %  Auto Monocyte % : 6.9 %  Auto Eosinophil % : 3.6 %  Auto Basophil % : 0.6 %      ----------CHEM PANEL----------            Imaging: ----------     57 y/o M with HTN, HLD, T1DM (on Insulin pump), CKD s/p renal transplant, CVA with residual, DVT s/p Eliquis, CARMEN (on nocturnal CPAP), Restrictive Lung Disease, squamous cell carcinoma OM Left 4th toe s/p amputation, and right 2nd toe amputation presented to the ED for right 5th toe ulcer. Pt states that he had the ulcer for over 5 months and has been coming to the wound care center for treatment. Pt relates that yesterday after his appointment he noticed increase swelling and discomfort to the right foot. Pt denies any fever, chills, nausea, vomiting, chest pain, shortness of breath, or calf pain at this time. Pt is scheduled for right foot 5th metatarsal head resection & 5th metatarsal base exostectomy in am, 5/11.        Allergies    No Known Allergies    Intolerances        MEDICATIONS  (STANDING):  vancomycin  IVPB 1000 milliGRAM(s) IV Intermittent once    MEDICATIONS  (PRN):      Vital Signs Last 24 Hrs  T(C): 36.7 (10 May 2022 13:22), Max: 36.7 (10 May 2022 13:22)  T(F): 98 (10 May 2022 13:22), Max: 98 (10 May 2022 13:22)  HR: 85 (10 May 2022 13:22) (85 - 85)  BP: 149/75 (10 May 2022 13:22) (149/75 - 149/75)  BP(mean): --  RR: 18 (10 May 2022 13:22) (18 - 18)  SpO2: 99% (10 May 2022 13:22) (99% - 99%)    PHYSICAL EXAM:  Vascular: Non- pitting edema noted to right dorsal foot. DP palpable bilaterally; PT palpable bilaterally, Capillary refill (CFT) 3 seconds. Digital hair absent bilaterally.   Neurological: Light touch sensation diminished bilaterally.  Musculoskeletal:  Left 4th toe s/p amputation and distal phalanx amputation of the 2 & 3rd. Right 2nd toe amputation. 4/5 strength in all quadrants bilaterally, AJ & STJ ROM absent b/l.   Dermatological: Right lateral 5th metatarsal head 1 x 1 x 0.2 cm and 5th metatarsal base 4x 4 x 0.3 cm ulcers. All wound beds granular, with no periwound erythema. No purulence, no undermining, probes to bone.    CBC Full  -  ( 10 May 2022 14:58 )  WBC Count : 7.98 K/uL  RBC Count : 5.04 M/uL  Hemoglobin : 14.3 g/dL  Hematocrit : 43.1 %  Platelet Count - Automated : 245 K/uL  Mean Cell Volume : 85.5 fl  Mean Cell Hemoglobin : 28.4 pg  Mean Cell Hemoglobin Concentration : 33.2 gm/dL  Auto Neutrophil # : 5.92 K/uL  Auto Lymphocyte # : 1.13 K/uL  Auto Monocyte # : 0.55 K/uL  Auto Eosinophil # : 0.29 K/uL  Auto Basophil # : 0.05 K/uL  Auto Neutrophil % : 74.2 %  Auto Lymphocyte % : 14.2 %  Auto Monocyte % : 6.9 %  Auto Eosinophil % : 3.6 %  Auto Basophil % : 0.6 %      ----------CHEM PANEL----------

## 2022-05-10 NOTE — CONSULT NOTE ADULT - SUBJECTIVE AND OBJECTIVE BOX
[facility] - Julisa Jansen, Luly Larson, Leticia, Karan, Eugenio Thompson  Office Number: [phone]    Initial Consult Note:  This is a 55 y/o M with HTN, HLD, T1DM (on Insulin pump), CKD s/p renal transplant, CVA with residual, DVT s/p Eliquis, CARMEN (on nocturnal CPAP), Restrictive Lung Disease, squamous cell carcinoma OM Left 4th toe s/p amputation, and right 2nd toe amputation presented to the ED for right 5th toe ulcer.  Planned for right foot 5th metatarsal head resection & 5th metatarsal base exostectomy in am, 5/11.    CHIEF COMPLAINT: Patient is a 56y old  Male who presents with a chief complaint of Right foot infected ulcer (10 May 2022 15:08)    HPI:    PAST MEDICAL & SURGICAL HISTORY:  Hypertension    Hyperlipidemia    Diabetes mellitus  Type 1 on insulin pump    CKD (chronic kidney disease)  Renal Transplant 2013 at [facility]    Diabetic peripheral neuropathy    Obesity (BMI 30-39.9)    CVA (cerebral vascular accident)  Wallenberg Stroke  low L body sensation  low R face sensation  decreased peripheral vision  poor balance    Squamous cell carcinoma of skin of left ear    History of DVT (deep vein thrombosis)  s/p eliquis    Recurrent squamous cell carcinoma of skin  nose, L arm      Toe infection  2007 L 4th Toe surgery-amputation secondary to osteomyelitis    Ear disease  Left inner ear surgery, 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, Can NOT have MRI    Vasectomy status  s/p b/l  vasectomy    H/O foot surgery  2005 - right foot - bone fracture - s/p ORIF and subsequent removal of hardware    End-stage renal failure with renal transplant  12/2013    S/P kidney transplant    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    SOCIAL HISTORY:  No tobacco, ethanol, or drug abuse.  FAMILY HISTORY:  Family history of diabetes mellitus (DM)    FH: skin cancer    No family history of acute MI or sudden cardiac death.  MEDICATIONS  (STANDING):  vancomycin  IVPB 1000 milliGRAM(s) IV Intermittent once    MEDICATIONS  (PRN):    Allergies    No Known Allergies    Intolerances    REVIEW OF SYSTEMS:  CONSTITUTIONAL: No weakness, fevers or chills  EYES/ENT: No visual changes;  No vertigo or throat pain   NECK: No pain or stiffness  RESPIRATORY: No cough, wheezing, hemoptysis; No shortness of breath  CARDIOVASCULAR: No chest pain or palpitations  GASTROINTESTINAL: No abdominal pain. No nausea, vomiting, or hematemesis; No diarrhea or constipation. No melena or hematochezia.  GENITOURINARY: No dysuria, frequency or hematuria  NEUROLOGICAL: No numbness or weakness  SKIN: No itching or rash  All other review of systems is negative unless indicated above  VITAL SIGNS:   Vital Signs Last 24 Hrs  T(C): 36.7 (10 May 2022 13:22), Max: 36.7 (10 May 2022 13:22)  T(F): 98 (10 May 2022 13:22), Max: 98 (10 May 2022 13:22)  HR: 85 (10 May 2022 13:22) (85 - 85)  BP: 149/75 (10 May 2022 13:22) (149/75 - 149/75)  BP(mean): --  RR: 18 (10 May 2022 13:22) (18 - 18)  SpO2: 99% (10 May 2022 13:22) (99% - 99%)  I&O's Summary    On Exam:  Constitutional: NAD, alert and oriented x 3  Lungs:  Non-labored, breath sounds are clear bilaterally, No wheezing, rales or rhonchi  Cardiovascular: RRR.  S1 and S2 positive.  No murmurs, rubs, gallops or clicks  Gastrointestinal: Bowel Sounds present, soft, nontender.   Lymph: No peripheral edema. No cervical lymphadenopathy.  Neurological: Alert, no focal deficits  Skin: No rashes or ulcers   Psych:  Mood & affect appropriate.    LABS: All Labs Reviewed:                        14.3   7.98  )-----------( 245      ( 10 May 2022 14:58 )             43.1     10 May 2022 14:58    138    |  105    |  20     ----------------------------<  195    4.3     |  29     |  1.10     Ca    9.1        10 May 2022 14:58    TPro  8.0    /  Alb  3.5    /  TBili  0.5    /  DBili  x      /  AST  13     /  ALT  18     /  AlkPhos  107    10 May 2022 14:58    PT/INR - ( 10 May 2022 14:58 )   PT: 12.0 sec;   INR: 1.03 ratio      PTT - ( 10 May 2022 14:58 )  PTT:29.0 sec  Blood Culture:     RADIOLOGY:    Patient name: LUTHER NORIEGA  YOB: 1965   Age: 51 (M)   MR#: [id number]  Study Date: 7/27/2017  Location: O/PSonographer: Susie Zuleta RDCS  Study quality: Technically difficult  Referring Physician: Kashmir Ochoa MD  Blood Pressure: 150/77 mmHg  Height: 185 cm  Weight: 136 kg  BSA: 2.6 m2  ------------------------------------------------------------------------  PROCEDURE: Transthoracic echocardiogram with 2-D, M-Mode  and complete spectral and color flow Doppler. Intravenous  catheter inserted. Verbal consent was obtained for  injection of echo contrast following a discussion of risks  and benefits. Following intravenous injection of contrast,  harmonic imaging was performed.  INDICATION: Primary pulmonary hypertension(I27.0)  ------------------------------------------------------------------------  Dimensions:    Normal Values:  LA:     4.0    2.0 - 4.0 cm  Ao:     3.5    2.0 - 3.8 cm  SEPTUM: 1.1    0.6 - 1.2 cm  PWT:    1.2    0.6 - 1.1 cm  LVIDd:  4.9    3.0 -5.6 cm  LVIDs:  3.3    1.8 - 4.0 cm  Derived variables:  LVMI: 83 g/m2  RWT: 0.48  Fractional short: 33 %  EF (Blasicholtz): 61 %  ------------------------------------------------------------------------  Observations:  Mitral Valve: Normal mitral valve. Minimal mitral  regurgitation.  Aortic Valve/Aorta: Normal trileaflet aortic valve.  Aortic Root: 3.5 cm.  Left Atrium: Normal left atrium.  LA volume index = 20  cc/m2.  Left Ventricle: Normal left ventricular systolic function.  No segmental wallmotion abnormalities. Endocardial  visualization enhanced with intravenous injection of echo  contrast (Definity). Increased relative wall thickness with  normal left ventricular mass index, consistent with  concentric left ventricular remodeling.  Right Heart: Normal right atrium. Right ventricular  enlargement with normal right ventricular systolic  function. Normal tricuspid valve. Minimal tricuspid  regurgitation. Normal pulmonic valve.  Pericardium/Pleura: Normal pericardium with no pericardial  effusion.  Hemodynamic: Estimated right atrial pressure is 8 mm Hg.  Estimated right ventricular systolic pressure equals 29 mm  Hg, assuming right atrial pressure equals 8 mm Hg,  consistent with normal pulmonary pressures.  ------------------------------------------------------------------------  Conclusions:  1. Increased relative wall thickness with normal left  ventricular mass index, consistent with concentric left  ventricular remodeling.  2. Normal left ventricular systolic function. No segmental  wall motion abnormalities. Endocardial visualization  enhanced with intravenous injection of echo contrast  (Definity).  ------------------------------------------------------------------------  Confirmed on  7/27/2017 - 13:49:24 by RASHEED Del Cid  ------------------------------------------------------------------------    EKG:     [facility] - Julisa Jansen, Luly Larson, Leticia, Karan, Jay, Eugenio  Office Number: [phone]    Initial Consult Note:  This is a 57 y/o M with HTN, HLD, T1DM (on Insulin pump), CKD s/p renal transplant, CVA with residual, DVT s/p Eliquis, CARMEN (on nocturnal CPAP), Restrictive Lung Disease, squamous cell carcinoma OM Left 4th toe s/p amputation, and right 2nd toe amputation presented to the ED for right 5th toe ulcer.  Planned for right foot 5th metatarsal head resection & 5th metatarsal base exostectomy in am, 5/11.    CHIEF COMPLAINT: Patient is a 56y old  Male who presents with a chief complaint of Right foot infected ulcer (10 May 2022 15:08)    HPI: This is a 57 y/o M with HTN, HLD, T1DM (on Insulin pump), CKD s/p renal transplant, CVA with residual, DVT s/p Eliquis, CARMEN (on nocturnal CPAP), Restrictive Lung Disease, squamous cell carcinoma OM Left 4th toe s/p amputation, and right 2nd toe amputation presented to the ED for right 5th toe ulcer.  Planned for right foot 5th metatarsal head resection & 5th metatarsal base exostectomy in am, 5/11.  Denies chest pain, palpitations, SOB, WOOTEN or orthopnea.  Denies fever or chills.  Admits to have chronic BLE edema R>L and uses compression stockings from time to time.  Has had chronic right foot wound and has been seeing Dr. Araiza.  Today, he noticed his foot being more swollen and red.  Went to the Wound Center and advised to come to the ED.    In the ED, patient was given Vanco and Zosyn x 1.  CxR showed persistently elevated right hemidiaphragm.  Hazy right basilar opacity, possibly subsegmental atelectasis or a small right pleural effusion with associated passive atelectasis.  BLE venous doppler negative of DVT.  EKG pending.  Labs unremarkable    PAST MEDICAL & SURGICAL HISTORY:  Hypertension    Hyperlipidemia    Diabetes mellitus  Type 1 on insulin pump    CKD (chronic kidney disease)  Renal Transplant 2013 at 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  FAMILY HISTORY:  Family history of diabetes mellitus (DM)    FH: skin cancer    No family history of acute MI or sudden cardiac death.  MEDICATIONS  (STANDING):  vancomycin  IVPB 1000 milliGRAM(s) IV Intermittent once    MEDICATIONS  (PRN):    Allergies    No Known Allergies    Intolerances    REVIEW OF SYSTEMS:  CONSTITUTIONAL: No weakness, fevers or chills  EYES/ENT: +chronic visual changes from remote CVA;  No vertigo or throat pain   NECK: No pain or stiffness  RESPIRATORY: No cough, wheezing, hemoptysis; No shortness of breath  CARDIOVASCULAR: No chest pain or palpitations +chronic BLE edema R>L  GASTROINTESTINAL: No abdominal pain. No nausea, vomiting, or hematemesis; No diarrhea or constipation. No melena or hematochezia.  GENITOURINARY: No dysuria, frequency or hematuria  NEUROLOGICAL: No numbness or weakness +reduced sensation left side; slight right facial/eye droop  SKIN: No itching or rash +chronic ulcer right foot, dressing dry and intact  All other review of systems is negative unless indicated above  VITAL SIGNS:   Vital Signs Last 24 Hrs  T(C): 36.7 (10 May 2022 13:22), Max: 36.7 (10 May 2022 13:22)  T(F): 98 (10 May 2022 13:22), Max: 98 (10 May 2022 13:22)  HR: 85 (10 May 2022 13:22) (85 - 85)  BP: 149/75 (10 May 2022 13:22) (149/75 - 149/75)  BP(mean): --  RR: 18 (10 May 2022 13:22) (18 - 18)  SpO2: 99% (10 May 2022 13:22) (99% - 99%)  I&O's Summary    On Exam:  Constitutional: NAD, alert and oriented x 3.  Morbidly obese  Lungs:  Non-labored, breath sounds are clear bilaterally but right base diminished, No wheezing, rales or rhonchi  Cardiovascular: RRR.  S1 and S2 positive.  No murmurs, rubs, gallops or clicks  Gastrointestinal: Bowel Sounds present, soft, nontender.   Lymph: +2+BLE edema R>L. No cervical lymphadenopathy.  Neurological: Alert, no focal deficits  Skin: No rashes. +right foot ulcer  Psych:  Mood & affect appropriate.    LABS: All Labs Reviewed:                        14.3   7.98  )-----------( 245      ( 10 May 2022 14:58 )             43.1     10 May 2022 14:58    138    |  105    |  20     ----------------------------<  195    4.3     |  29     |  1.10     Ca    9.1        10 May 2022 14:58    TPro  8.0    /  Alb  3.5    /  TBili  0.5    /  DBili  x      /  AST  13     /  ALT  18     /  AlkPhos  107    10 May 2022 14:58    PT/INR - ( 10 May 2022 14:58 )   PT: 12.0 sec;   INR: 1.03 ratio      PTT - ( 10 May 2022 14:58 )  PTT:29.0 sec  Blood Culture:     RADIOLOGY:    Patient name: LUTHER NORIEGA  YOB: 1965   Age: 51 (M)   MR#: [id number]  Study Date: 7/27/2017  Location: O/PSonographer: Susie Zuleta RDCS  Study quality: Technically difficult  Referring Physician: Kashmir Ochoa MD  Blood Pressure: 150/77 mmHg  Height: 185 cm  Weight: 136 kg  BSA: 2.6 m2  ------------------------------------------------------------------------  PROCEDURE: Transthoracic echocardiogram with 2-D, M-Mode  and complete spectral and color flow Doppler. Intravenous  catheter inserted. Verbal consent was obtained for  injection of echo contrast following a discussion of risks  and benefits. Following intravenous injection of contrast,  harmonic imaging was performed.  INDICATION: Primary pulmonary hypertension(I27.0)  ------------------------------------------------------------------------  Dimensions:    Normal Values:  LA:     4.0    2.0 - 4.0 cm  Ao:     3.5    2.0 - 3.8 cm  SEPTUM: 1.1    0.6 - 1.2 cm  PWT:    1.2    0.6 - 1.1 cm  LVIDd:  4.9    3.0 -5.6 cm  LVIDs:  3.3    1.8 - 4.0 cm  Derived variables:  LVMI: 83 g/m2  RWT: 0.48  Fractional short: 33 %  EF (Teicholtz): 61 %  ------------------------------------------------------------------------  Observations:  Mitral Valve: Normal mitral valve. Minimal mitral  regurgitation.  Aortic Valve/Aorta: Normal trileaflet aortic valve.  Aortic Root: 3.5 cm.  Left Atrium: Normal left atrium.  LA volume index = 20  cc/m2.  Left Ventricle: Normal left ventricular systolic function.  No segmental wallmotion abnormalities. Endocardial  visualization enhanced with intravenous injection of echo  contrast (Definity). Increased relative wall thickness with  normal left ventricular mass index, consistent with  concentric left ventricular remodeling.  Right Heart: Normal right atrium. Right ventricular  enlargement with normal right ventricular systolic  function. Normal tricuspid valve. Minimal tricuspid  regurgitation. Normal pulmonic valve.  Pericardium/Pleura: Normal pericardium with no pericardial  effusion.  Hemodynamic: Estimated right atrial pressure is 8 mm Hg.  Estimated right ventricular systolic pressure equals 29 mm  Hg, assuming right atrial pressure equals 8 mm Hg,  consistent with normal pulmonary pressures.  ------------------------------------------------------------------------  Conclusions:  1. Increased relative wall thickness with normal left  ventricular mass index, consistent with concentric left  ventricular remodeling.  2. Normal left ventricular systolic function. No segmental  wall motion abnormalities. Endocardial visualization  enhanced with intravenous injection of echo contrast  (Definity).  ------------------------------------------------------------------------  Confirmed on  7/27/2017 - 13:49:24 by RASHEED Del Cid  ------------------------------------------------------------------------  ACC: [id number] EXAM:  XR CHEST PORTABLE URGENT 1V                          PROCEDURE DATE:  05/10/2022      INTERPRETATION:  TIME OF EXAM: May 10, 2022 at 3:08 PM.    CLINICAL INFORMATION: Admission chest x-ray.    COMPARISON:  February 14, 2022.    TECHNIQUE:   AP Portable chest x-ray.    INTERPRETATION:    Heart size and the mediastinum cannot be accurately evaluated on this   projection. Left-sided loop recorder again noted.  Continued elevation of right hemidiaphragm.  There is hazy right basilar opacity. The left lung is clear.  No left pleural effusion.  No pneumothorax.  No acute bony abnormality.      IMPRESSION:  Persistently elevated right hemidiaphragm.    Hazy right basilar opacity, possibly subsegmental atelectasis or a small   right pleural effusion with associated passive atelectasis.    --- End of Report ---    GREGORY IRAHETA MD; Attending Radiologist  This document has been electronically signed. May 10 2022  4:17PM    EKG:

## 2022-05-10 NOTE — ED PROVIDER NOTE - NSICDXPASTMEDICALHX_GEN_ALL_CORE_FT
PAST MEDICAL HISTORY:  CKD (chronic kidney disease) Renal Transplant 2013 at [facility]    CVA (cerebral vascular accident) Wallenberg Stroke  low L body sensation  low R face sensation  decreased peripheral vision  poor balance    Diabetes mellitus Type 1 on insulin pump    Diabetic peripheral neuropathy     History of DVT (deep vein thrombosis) s/p eliquis    Hyperlipidemia     Hypertension     Obesity (BMI 30-39.9)     Recurrent squamous cell carcinoma of skin nose, L arm    Squamous cell carcinoma of skin of left ear

## 2022-05-11 ENCOUNTER — RESULT REVIEW (OUTPATIENT)
Age: 57
End: 2022-05-11

## 2022-05-11 DIAGNOSIS — E11.621 TYPE 2 DIABETES MELLITUS WITH FOOT ULCER: ICD-10-CM

## 2022-05-11 DIAGNOSIS — Z89.422 ACQUIRED ABSENCE OF OTHER LEFT TOE(S): ICD-10-CM

## 2022-05-11 DIAGNOSIS — Z79.82 LONG TERM (CURRENT) USE OF ASPIRIN: ICD-10-CM

## 2022-05-11 DIAGNOSIS — Z94.0 KIDNEY TRANSPLANT STATUS: ICD-10-CM

## 2022-05-11 DIAGNOSIS — Z85.828 PERSONAL HISTORY OF OTHER MALIGNANT NEOPLASM OF SKIN: ICD-10-CM

## 2022-05-11 DIAGNOSIS — Z86.718 PERSONAL HISTORY OF OTHER VENOUS THROMBOSIS AND EMBOLISM: ICD-10-CM

## 2022-05-11 DIAGNOSIS — Z80.8 FAMILY HISTORY OF MALIGNANT NEOPLASM OF OTHER ORGANS OR SYSTEMS: ICD-10-CM

## 2022-05-11 DIAGNOSIS — L84 CORNS AND CALLOSITIES: ICD-10-CM

## 2022-05-11 DIAGNOSIS — Z89.421 ACQUIRED ABSENCE OF OTHER RIGHT TOE(S): ICD-10-CM

## 2022-05-11 DIAGNOSIS — E11.51 TYPE 2 DIABETES MELLITUS WITH DIABETIC PERIPHERAL ANGIOPATHY WITHOUT GANGRENE: ICD-10-CM

## 2022-05-11 DIAGNOSIS — L03.115 CELLULITIS OF RIGHT LOWER LIMB: ICD-10-CM

## 2022-05-11 DIAGNOSIS — E10.9 TYPE 1 DIABETES MELLITUS WITHOUT COMPLICATIONS: ICD-10-CM

## 2022-05-11 DIAGNOSIS — L97.412 NON-PRESSURE CHRONIC ULCER OF RIGHT HEEL AND MIDFOOT WITH FAT LAYER EXPOSED: ICD-10-CM

## 2022-05-11 DIAGNOSIS — Z79.4 LONG TERM (CURRENT) USE OF INSULIN: ICD-10-CM

## 2022-05-11 DIAGNOSIS — Z80.3 FAMILY HISTORY OF MALIGNANT NEOPLASM OF BREAST: ICD-10-CM

## 2022-05-11 DIAGNOSIS — Z86.16 PERSONAL HISTORY OF COVID-19: ICD-10-CM

## 2022-05-11 DIAGNOSIS — Z83.3 FAMILY HISTORY OF DIABETES MELLITUS: ICD-10-CM

## 2022-05-11 DIAGNOSIS — Z86.73 PERSONAL HISTORY OF TRANSIENT ISCHEMIC ATTACK (TIA), AND CEREBRAL INFARCTION WITHOUT RESIDUAL DEFICITS: ICD-10-CM

## 2022-05-11 DIAGNOSIS — E66.01 MORBID (SEVERE) OBESITY DUE TO EXCESS CALORIES: ICD-10-CM

## 2022-05-11 DIAGNOSIS — E78.5 HYPERLIPIDEMIA, UNSPECIFIED: ICD-10-CM

## 2022-05-11 DIAGNOSIS — G47.33 OBSTRUCTIVE SLEEP APNEA (ADULT) (PEDIATRIC): ICD-10-CM

## 2022-05-11 DIAGNOSIS — Z79.899 OTHER LONG TERM (CURRENT) DRUG THERAPY: ICD-10-CM

## 2022-05-11 DIAGNOSIS — Z87.81 PERSONAL HISTORY OF (HEALED) TRAUMATIC FRACTURE: ICD-10-CM

## 2022-05-11 DIAGNOSIS — E11.610 TYPE 2 DIABETES MELLITUS WITH DIABETIC NEUROPATHIC ARTHROPATHY: ICD-10-CM

## 2022-05-11 LAB
A1C WITH ESTIMATED AVERAGE GLUCOSE RESULT: 10.2 % — HIGH (ref 4–5.6)
A1C WITH ESTIMATED AVERAGE GLUCOSE RESULT: 10.4 % — HIGH (ref 4–5.6)
ANION GAP SERPL CALC-SCNC: 4 MMOL/L — LOW (ref 5–17)
BUN SERPL-MCNC: 18 MG/DL — SIGNIFICANT CHANGE UP (ref 7–23)
CALCIUM SERPL-MCNC: 9.2 MG/DL — SIGNIFICANT CHANGE UP (ref 8.5–10.1)
CHLORIDE SERPL-SCNC: 105 MMOL/L — SIGNIFICANT CHANGE UP (ref 96–108)
CO2 SERPL-SCNC: 31 MMOL/L — SIGNIFICANT CHANGE UP (ref 22–31)
CREAT SERPL-MCNC: 1.2 MG/DL — SIGNIFICANT CHANGE UP (ref 0.5–1.3)
CRP SERPL-MCNC: 25 MG/L — HIGH
CRP SERPL-MCNC: 29 MG/L — HIGH
CULTURE RESULTS: NO GROWTH — SIGNIFICANT CHANGE UP
EGFR: 71 ML/MIN/1.73M2 — SIGNIFICANT CHANGE UP
ERYTHROCYTE [SEDIMENTATION RATE] IN BLOOD: 25 MM/HR — HIGH (ref 0–20)
ESTIMATED AVERAGE GLUCOSE: 246 MG/DL — HIGH (ref 68–114)
ESTIMATED AVERAGE GLUCOSE: 252 MG/DL — HIGH (ref 68–114)
GLUCOSE SERPL-MCNC: 126 MG/DL — HIGH (ref 70–99)
HCT VFR BLD CALC: 39.7 % — SIGNIFICANT CHANGE UP (ref 39–50)
HGB BLD-MCNC: 13.1 G/DL — SIGNIFICANT CHANGE UP (ref 13–17)
MAGNESIUM SERPL-MCNC: 2 MG/DL — SIGNIFICANT CHANGE UP (ref 1.6–2.6)
MCHC RBC-ENTMCNC: 28.4 PG — SIGNIFICANT CHANGE UP (ref 27–34)
MCHC RBC-ENTMCNC: 33 GM/DL — SIGNIFICANT CHANGE UP (ref 32–36)
MCV RBC AUTO: 85.9 FL — SIGNIFICANT CHANGE UP (ref 80–100)
NRBC # BLD: 0 /100 WBCS — SIGNIFICANT CHANGE UP (ref 0–0)
PLATELET # BLD AUTO: 229 K/UL — SIGNIFICANT CHANGE UP (ref 150–400)
POTASSIUM SERPL-MCNC: 4 MMOL/L — SIGNIFICANT CHANGE UP (ref 3.5–5.3)
POTASSIUM SERPL-SCNC: 4 MMOL/L — SIGNIFICANT CHANGE UP (ref 3.5–5.3)
PROCALCITONIN SERPL-MCNC: <0.05 — SIGNIFICANT CHANGE UP (ref 0–0.04)
RBC # BLD: 4.62 M/UL — SIGNIFICANT CHANGE UP (ref 4.2–5.8)
RBC # FLD: 15 % — HIGH (ref 10.3–14.5)
SODIUM SERPL-SCNC: 140 MMOL/L — SIGNIFICANT CHANGE UP (ref 135–145)
SPECIMEN SOURCE: SIGNIFICANT CHANGE UP
WBC # BLD: 7.53 K/UL — SIGNIFICANT CHANGE UP (ref 3.8–10.5)
WBC # FLD AUTO: 7.53 K/UL — SIGNIFICANT CHANGE UP (ref 3.8–10.5)

## 2022-05-11 PROCEDURE — 93010 ELECTROCARDIOGRAM REPORT: CPT

## 2022-05-11 PROCEDURE — 88311 DECALCIFY TISSUE: CPT | Mod: 26

## 2022-05-11 PROCEDURE — 99232 SBSQ HOSP IP/OBS MODERATE 35: CPT

## 2022-05-11 PROCEDURE — 73630 X-RAY EXAM OF FOOT: CPT | Mod: 26,RT

## 2022-05-11 PROCEDURE — 88304 TISSUE EXAM BY PATHOLOGIST: CPT | Mod: 26

## 2022-05-11 PROCEDURE — 28122 PARTIAL REMOVAL OF FOOT BONE: CPT

## 2022-05-11 PROCEDURE — 28113 PART REMOVAL OF METATARSAL: CPT | Mod: 59

## 2022-05-11 PROCEDURE — 99221 1ST HOSP IP/OBS SF/LOW 40: CPT

## 2022-05-11 RX ORDER — GLUCAGON INJECTION, SOLUTION 0.5 MG/.1ML
1 INJECTION, SOLUTION SUBCUTANEOUS ONCE
Refills: 0 | Status: DISCONTINUED | OUTPATIENT
Start: 2022-05-11 | End: 2022-05-16

## 2022-05-11 RX ORDER — TACROLIMUS 5 MG/1
1 CAPSULE ORAL
Refills: 0 | Status: DISCONTINUED | OUTPATIENT
Start: 2022-05-11 | End: 2022-05-16

## 2022-05-11 RX ORDER — METOPROLOL TARTRATE 50 MG
75 TABLET ORAL EVERY 12 HOURS
Refills: 0 | Status: DISCONTINUED | OUTPATIENT
Start: 2022-05-11 | End: 2022-05-16

## 2022-05-11 RX ORDER — TACROLIMUS 5 MG/1
1 CAPSULE ORAL
Refills: 0 | Status: DISCONTINUED | OUTPATIENT
Start: 2022-05-11 | End: 2022-05-11

## 2022-05-11 RX ORDER — SODIUM CHLORIDE 9 MG/ML
1000 INJECTION, SOLUTION INTRAVENOUS
Refills: 0 | Status: DISCONTINUED | OUTPATIENT
Start: 2022-05-11 | End: 2022-05-11

## 2022-05-11 RX ORDER — DEXTROSE 50 % IN WATER 50 %
25 SYRINGE (ML) INTRAVENOUS ONCE
Refills: 0 | Status: DISCONTINUED | OUTPATIENT
Start: 2022-05-11 | End: 2022-05-16

## 2022-05-11 RX ORDER — LOSARTAN POTASSIUM 100 MG/1
25 TABLET, FILM COATED ORAL DAILY
Refills: 0 | Status: DISCONTINUED | OUTPATIENT
Start: 2022-05-11 | End: 2022-05-16

## 2022-05-11 RX ORDER — ACETAMINOPHEN 500 MG
650 TABLET ORAL EVERY 6 HOURS
Refills: 0 | Status: DISCONTINUED | OUTPATIENT
Start: 2022-05-11 | End: 2022-05-16

## 2022-05-11 RX ORDER — OXYCODONE HYDROCHLORIDE 5 MG/1
5 TABLET ORAL ONCE
Refills: 0 | Status: DISCONTINUED | OUTPATIENT
Start: 2022-05-11 | End: 2022-05-11

## 2022-05-11 RX ORDER — ATORVASTATIN CALCIUM 80 MG/1
40 TABLET, FILM COATED ORAL AT BEDTIME
Refills: 0 | Status: DISCONTINUED | OUTPATIENT
Start: 2022-05-11 | End: 2022-05-16

## 2022-05-11 RX ORDER — INSULIN LISPRO 100/ML
1 VIAL (ML) SUBCUTANEOUS
Refills: 0 | Status: DISCONTINUED | OUTPATIENT
Start: 2022-05-11 | End: 2022-05-12

## 2022-05-11 RX ORDER — LANOLIN ALCOHOL/MO/W.PET/CERES
3 CREAM (GRAM) TOPICAL AT BEDTIME
Refills: 0 | Status: DISCONTINUED | OUTPATIENT
Start: 2022-05-11 | End: 2022-05-16

## 2022-05-11 RX ORDER — SODIUM CHLORIDE 9 MG/ML
1000 INJECTION, SOLUTION INTRAVENOUS
Refills: 0 | Status: DISCONTINUED | OUTPATIENT
Start: 2022-05-11 | End: 2022-05-16

## 2022-05-11 RX ORDER — AZATHIOPRINE 100 MG/1
100 TABLET ORAL DAILY
Refills: 0 | Status: DISCONTINUED | OUTPATIENT
Start: 2022-05-11 | End: 2022-05-16

## 2022-05-11 RX ORDER — GABAPENTIN 400 MG/1
300 CAPSULE ORAL
Refills: 0 | Status: DISCONTINUED | OUTPATIENT
Start: 2022-05-11 | End: 2022-05-16

## 2022-05-11 RX ORDER — ONDANSETRON 8 MG/1
4 TABLET, FILM COATED ORAL ONCE
Refills: 0 | Status: DISCONTINUED | OUTPATIENT
Start: 2022-05-11 | End: 2022-05-11

## 2022-05-11 RX ORDER — INSULIN LISPRO 100/ML
1 VIAL (ML) SUBCUTANEOUS
Refills: 0 | Status: DISCONTINUED | OUTPATIENT
Start: 2022-05-11 | End: 2022-05-11

## 2022-05-11 RX ORDER — TACROLIMUS 5 MG/1
0.5 CAPSULE ORAL
Refills: 0 | Status: DISCONTINUED | OUTPATIENT
Start: 2022-05-11 | End: 2022-05-16

## 2022-05-11 RX ORDER — HYDROMORPHONE HYDROCHLORIDE 2 MG/ML
0.5 INJECTION INTRAMUSCULAR; INTRAVENOUS; SUBCUTANEOUS
Refills: 0 | Status: DISCONTINUED | OUTPATIENT
Start: 2022-05-11 | End: 2022-05-11

## 2022-05-11 RX ORDER — HYDRALAZINE HCL 50 MG
25 TABLET ORAL THREE TIMES A DAY
Refills: 0 | Status: DISCONTINUED | OUTPATIENT
Start: 2022-05-11 | End: 2022-05-16

## 2022-05-11 RX ORDER — DEXTROSE 50 % IN WATER 50 %
15 SYRINGE (ML) INTRAVENOUS ONCE
Refills: 0 | Status: DISCONTINUED | OUTPATIENT
Start: 2022-05-11 | End: 2022-05-16

## 2022-05-11 RX ADMIN — PIPERACILLIN AND TAZOBACTAM 25 GRAM(S): 4; .5 INJECTION, POWDER, LYOPHILIZED, FOR SOLUTION INTRAVENOUS at 14:54

## 2022-05-11 RX ADMIN — Medication 25 MILLIGRAM(S): at 05:03

## 2022-05-11 RX ADMIN — Medication 25 MILLIGRAM(S): at 14:54

## 2022-05-11 RX ADMIN — TACROLIMUS 0.5 MILLIGRAM(S): 5 CAPSULE ORAL at 10:12

## 2022-05-11 RX ADMIN — AZATHIOPRINE 100 MILLIGRAM(S): 100 TABLET ORAL at 12:24

## 2022-05-11 RX ADMIN — TACROLIMUS 1 MILLIGRAM(S): 5 CAPSULE ORAL at 21:37

## 2022-05-11 RX ADMIN — Medication 1 TABLET(S): at 12:24

## 2022-05-11 RX ADMIN — TACROLIMUS 1 MILLIGRAM(S): 5 CAPSULE ORAL at 10:12

## 2022-05-11 RX ADMIN — Medication 75 MILLIGRAM(S): at 05:03

## 2022-05-11 RX ADMIN — Medication 0.1 MILLIGRAM(S): at 05:03

## 2022-05-11 RX ADMIN — LOSARTAN POTASSIUM 25 MILLIGRAM(S): 100 TABLET, FILM COATED ORAL at 05:04

## 2022-05-11 RX ADMIN — TACROLIMUS 0.5 MILLIGRAM(S): 5 CAPSULE ORAL at 21:02

## 2022-05-11 RX ADMIN — Medication 25 MILLIGRAM(S): at 21:02

## 2022-05-11 RX ADMIN — PIPERACILLIN AND TAZOBACTAM 25 GRAM(S): 4; .5 INJECTION, POWDER, LYOPHILIZED, FOR SOLUTION INTRAVENOUS at 05:14

## 2022-05-11 RX ADMIN — ATORVASTATIN CALCIUM 40 MILLIGRAM(S): 80 TABLET, FILM COATED ORAL at 21:02

## 2022-05-11 RX ADMIN — GABAPENTIN 300 MILLIGRAM(S): 400 CAPSULE ORAL at 05:03

## 2022-05-11 NOTE — CONSULT NOTE ADULT - SUBJECTIVE AND OBJECTIVE BOX
Patient is a 56y old  Male who presents with a chief complaint of R 5th toe infection (11 May 2022 09:15)    Type: 1 DX 30 years ago.  known complications: neuropathy. Endocrine: Dr Jasbir Garcia.  Rx home: mini med pump/Humalog insulin. Dexcom sensor.  Glucometer checks- has but mainly uses sensor. no anticipated needs at this time. diabetes education provided      HPI:  This is a 57 y/o M with HTN, HLD, T1DM (on Insulin pump), CKD s/p renal transplant, CVA with residual, DVT s/p Eliquis, CARMEN (on nocturnal CPAP), Restrictive Lung Disease, squamous cell carcinoma OM Left 4th toe s/p amputation, and right 2nd toe amputation presented to the ED for right 5th toe ulcer.  Planned for right foot 5th metatarsal head resection & 5th metatarsal base exostectomy in am, 5/11. (10 May 2022 16:13)      PAST MEDICAL & SURGICAL HISTORY:  Hypertension      Hyperlipidemia      Diabetes mellitus  Type 1 on insulin pump      CKD (chronic kidney disease)  Renal Transplant 2013 at 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, Can NOT have MRI      Vasectomy status  s/p b/l  vasectomy      H/O foot surgery  2005 - right foot - bone fracture - s/p ORIF and subsequent removal of hardware      End-stage renal failure with renal transplant  12/2013      S/P kidney transplant      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          REVIEW OF SYSTEMS  General:	as above  Respiratory: NAD, No SOB, no cough  Cardiovascular: No chest pain, no palpitations	  Endocrine: no polyuria, no polydipsia, or S/S of hypoglycemia        Allergies    No Known Allergies    Intolerances        MEDICATIONS  (STANDING):  aspirin enteric coated 162 milliGRAM(s) Oral daily  atorvastatin 40 milliGRAM(s) Oral at bedtime  azaTHIOprine 100 milliGRAM(s) Oral daily  cloNIDine 0.1 milliGRAM(s) Oral two times a day  dextrose 5%. 1000 milliLiter(s) (50 mL/Hr) IV Continuous <Continuous>  dextrose 5%. 1000 milliLiter(s) (100 mL/Hr) IV Continuous <Continuous>  dextrose 50% Injectable 25 Gram(s) IV Push once  dextrose 50% Injectable 12.5 Gram(s) IV Push once  dextrose 50% Injectable 25 Gram(s) IV Push once  gabapentin 300 milliGRAM(s) Oral two times a day  glucagon  Injectable 1 milliGRAM(s) IntraMuscular once  hydrALAZINE 25 milliGRAM(s) Oral three times a day  insulin lispro (HumaLOG) Pump 1 Each SubCutaneous Continuous Pump  losartan 25 milliGRAM(s) Oral daily  metoprolol tartrate 75 milliGRAM(s) Oral every 12 hours  piperacillin/tazobactam IVPB.. 3.375 Gram(s) IV Intermittent every 8 hours  tacrolimus 0.5 milliGRAM(s) Oral <User Schedule>  tacrolimus 1 milliGRAM(s) Oral <User Schedule>  trimethoprim   80 mG/sulfamethoxazole 400 mG 1 Tablet(s) Oral daily

## 2022-05-11 NOTE — PROGRESS NOTE ADULT - SUBJECTIVE AND OBJECTIVE BOX
Patient is a 56y old  Male who presents with a chief complaint of R 5th toe infection (11 May 2022 10:38)      INTERVAL HPI/OVERNIGHT EVENTS: Patient seen and examined. NAD. No complaints.    Vital Signs Last 24 Hrs  T(C): 36.3 (11 May 2022 04:48), Max: 36.7 (10 May 2022 13:22)  T(F): 97.4 (11 May 2022 04:48), Max: 98 (10 May 2022 13:22)  HR: 64 (11 May 2022 04:48) (64 - 85)  BP: 121/62 (11 May 2022 04:48) (121/62 - 169/90)  BP(mean): --  RR: 19 (11 May 2022 04:48) (18 - 20)  SpO2: 95% (11 May 2022 04:48) (95% - 99%)    05    140  |  105  |  18  ----------------------------<  126<H>  4.0   |  31  |  1.20    Ca    9.2      11 May 2022 08:33  Mg     2.0         TPro  8.0  /  Alb  3.5  /  TBili  0.5  /  DBili  x   /  AST  13<L>  /  ALT  18  /  AlkPhos  107  05-10                          13.1   7.53  )-----------( 229      ( 11 May 2022 08:33 )             39.7     PT/INR - ( 10 May 2022 14:58 )   PT: 12.0 sec;   INR: 1.03 ratio         PTT - ( 10 May 2022 14:58 )  PTT:29.0 sec  CAPILLARY BLOOD GLUCOSE      POCT Blood Glucose.: 134 mg/dL (11 May 2022 06:37)  POCT Blood Glucose.: 222 mg/dL (10 May 2022 21:27)    Urinalysis Basic - ( 10 May 2022 18:42 )    Color: Yellow / Appearance: Clear / S.015 / pH: x  Gluc: x / Ketone: Trace  / Bili: Negative / Urobili: Negative   Blood: x / Protein: 30 mg/dL / Nitrite: Negative   Leuk Esterase: Negative / RBC: 0-2 /HPF / WBC Negative   Sq Epi: x / Non Sq Epi: Occasional / Bacteria: Occasional              acetaminophen     Tablet .. 650 milliGRAM(s) Oral every 6 hours PRN  aluminum hydroxide/magnesium hydroxide/simethicone Suspension 30 milliLiter(s) Oral every 4 hours PRN  aspirin enteric coated 162 milliGRAM(s) Oral daily  atorvastatin 40 milliGRAM(s) Oral at bedtime  azaTHIOprine 100 milliGRAM(s) Oral daily  cloNIDine 0.1 milliGRAM(s) Oral two times a day  dextrose 5%. 1000 milliLiter(s) IV Continuous <Continuous>  dextrose 5%. 1000 milliLiter(s) IV Continuous <Continuous>  dextrose 50% Injectable 25 Gram(s) IV Push once  dextrose 50% Injectable 12.5 Gram(s) IV Push once  dextrose 50% Injectable 25 Gram(s) IV Push once  dextrose Oral Gel 15 Gram(s) Oral once PRN  gabapentin 300 milliGRAM(s) Oral two times a day  glucagon  Injectable 1 milliGRAM(s) IntraMuscular once  hydrALAZINE 25 milliGRAM(s) Oral three times a day  insulin lispro (HumaLOG) Pump 1 Each SubCutaneous Continuous Pump  losartan 25 milliGRAM(s) Oral daily  melatonin 3 milliGRAM(s) Oral at bedtime PRN  metoprolol tartrate 75 milliGRAM(s) Oral every 12 hours  ondansetron Injectable 4 milliGRAM(s) IV Push every 6 hours PRN  piperacillin/tazobactam IVPB.. 3.375 Gram(s) IV Intermittent every 8 hours  tacrolimus 0.5 milliGRAM(s) Oral <User Schedule>  tacrolimus 1 milliGRAM(s) Oral <User Schedule>  trimethoprim   80 mG/sulfamethoxazole 400 mG 1 Tablet(s) Oral daily              REVIEW OF SYSTEMS:  CONSTITUTIONAL: No fever, no weight loss, or no fatigue  NECK: No pain, no stiffness  RESPIRATORY: No cough, no wheezing, no chills, no hemoptysis, No shortness of breath  CARDIOVASCULAR: No chest pain, no palpitations, no dizziness, no leg swelling  GASTROINTESTINAL: No abdominal pain. No nausea, no vomiting, no hematemesis; No diarrhea, no constipation. No melena, no hematochezia.  GENITOURINARY: No dysuria, no frequency, no hematuria, no incontinence  NEUROLOGICAL: No headaches, no loss of strength, no numbness, no tremors  SKIN: No itching, no burning  MUSCULOSKELETAL: No joint pain, no swelling; No muscle, no back, no extremity pain  PSYCHIATRIC: No depression, no mood swings,   HEME/LYMPH: No easy bruising, no bleeding gums  ALLERY AND IMMUNOLOGIC: No hives       Consultant(s) Notes Reviewed:  [X] YES  [ ] NO    PHYSICAL EXAM:  GENERAL: NAD  HEAD:  Atraumatic, Normocephalic  EYES: EOMI, PERRLA, conjunctiva and sclera clear  ENMT: No tonsillar erythema, exudates, or enlargement; Moist mucous membranes  NECK: Supple, No JVD  NERVOUS SYSTEM:  Awake & alert  CHEST/LUNG: Clear to auscultation bilaterally; No rales, rhonchi, wheezing,  HEART: Regular rate and rhythm  ABDOMEN: Soft, Nontender, Nondistended; Bowel sounds present  EXTREMITIES:  No clubbing, cyanosis, or edema; right foot bandaged  LYMPH: No lymphadenopathy noted  SKIN: No rashes      Advanced care planning discussed with patient/family [X] YES   [ ] NO    Advanced care planning discussed with patient/family. Patient's health status was discussed. All appropriate changes have been made regarding patient's end-of-life care. Advanced care planning forms reviewed/discussed/completed.  20 minutes spent.

## 2022-05-11 NOTE — DIETITIAN INITIAL EVALUATION ADULT - PERSON TAUGHT/METHOD
patient refusing handout on consistent cho diet at this time/verbal instruction/patient instructed yes

## 2022-05-11 NOTE — VITALS
[Pain related to present condition?] : The patient's  pain is not related to present condition. [] : No [de-identified] : 0/10

## 2022-05-11 NOTE — DIETITIAN INITIAL EVALUATION ADULT - PERTINENT MEDS FT
MEDICATIONS  (STANDING):  aspirin enteric coated 162 milliGRAM(s) Oral daily  atorvastatin 40 milliGRAM(s) Oral at bedtime  azaTHIOprine 100 milliGRAM(s) Oral daily  cloNIDine 0.1 milliGRAM(s) Oral two times a day  dextrose 5%. 1000 milliLiter(s) (50 mL/Hr) IV Continuous <Continuous>  dextrose 5%. 1000 milliLiter(s) (100 mL/Hr) IV Continuous <Continuous>  dextrose 50% Injectable 25 Gram(s) IV Push once  dextrose 50% Injectable 12.5 Gram(s) IV Push once  dextrose 50% Injectable 25 Gram(s) IV Push once  gabapentin 300 milliGRAM(s) Oral two times a day  glucagon  Injectable 1 milliGRAM(s) IntraMuscular once  hydrALAZINE 25 milliGRAM(s) Oral three times a day  insulin lispro (HumaLOG) Pump 1 Each SubCutaneous Continuous Pump  losartan 25 milliGRAM(s) Oral daily  metoprolol tartrate 75 milliGRAM(s) Oral every 12 hours  piperacillin/tazobactam IVPB.. 3.375 Gram(s) IV Intermittent every 8 hours  tacrolimus 0.5 milliGRAM(s) Oral <User Schedule>  tacrolimus 1 milliGRAM(s) Oral <User Schedule>  trimethoprim   80 mG/sulfamethoxazole 400 mG 1 Tablet(s) Oral daily    MEDICATIONS  (PRN):  acetaminophen     Tablet .. 650 milliGRAM(s) Oral every 6 hours PRN Temp greater or equal to 38C (100.4F), Mild Pain (1 - 3)  aluminum hydroxide/magnesium hydroxide/simethicone Suspension 30 milliLiter(s) Oral every 4 hours PRN Dyspepsia  dextrose Oral Gel 15 Gram(s) Oral once PRN Blood Glucose LESS THAN 70 milliGRAM(s)/deciliter  melatonin 3 milliGRAM(s) Oral at bedtime PRN Insomnia  ondansetron Injectable 4 milliGRAM(s) IV Push every 6 hours PRN Nausea and/or Vomiting

## 2022-05-11 NOTE — PROGRESS NOTE ADULT - SUBJECTIVE AND OBJECTIVE BOX
[facility] -- Julisa Jansen, Luly Larson, Karan Kelly, Jay Becker: Office # [phone]    Follow Up: Cardiac clearance, HTN, HLD     Subjective/Observations: Patient awake, alert, resting in bed. Ambulating well. Denies chest pain, palpitations and dizziness. Denies any difficulty breathing. No orthopnea and PND. Tolerating room air. Right foot DSD intact.     REVIEW OF SYSTEMS: All other review of systems are negative unless indicated above    PAST MEDICAL & SURGICAL HISTORY:  Hypertension      Hyperlipidemia      Diabetes mellitus  Type 1 on insulin pump      CKD (chronic kidney disease)  Renal Transplant 2013 at [facility]      Diabetic peripheral neuropathy      Obesity (BMI 30-39.9)      CVA (cerebral vascular accident)  Wallenberg Stroke  low L body sensation  low R face sensation  decreased peripheral vision  poor balance      Squamous cell carcinoma of skin of left ear      History of DVT (deep vein thrombosis)  s/p eliquis      Recurrent squamous cell carcinoma of skin  nose, L arm      Toe infection  2007 L 4th Toe surgery-amputation secondary to osteomyelitis      Ear disease  Left inner ear surgery, 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 1000 milliLiter(s) (50 mL/Hr) IV Continuous <Continuous>  dextrose 5%. 1000 milliLiter(s) (100 mL/Hr) IV Continuous <Continuous>  dextrose 50% Injectable 25 Gram(s) IV Push once  dextrose 50% Injectable 12.5 Gram(s) IV Push once  dextrose 50% Injectable 25 Gram(s) IV Push once  gabapentin 300 milliGRAM(s) Oral two times a day  glucagon  Injectable 1 milliGRAM(s) IntraMuscular once  hydrALAZINE 25 milliGRAM(s) Oral three times a day  losartan 25 milliGRAM(s) Oral daily  metoprolol tartrate 75 milliGRAM(s) Oral every 12 hours  piperacillin/tazobactam IVPB.. 3.375 Gram(s) IV Intermittent every 8 hours  tacrolimus 0.5 milliGRAM(s) Oral <User Schedule>  tacrolimus 1 milliGRAM(s) Oral <User Schedule>  trimethoprim   80 mG/sulfamethoxazole 400 mG 1 Tablet(s) Oral daily    MEDICATIONS  (PRN):  acetaminophen     Tablet .. 650 milliGRAM(s) Oral every 6 hours PRN Temp greater or equal to 38C (100.4F), Mild Pain (1 - 3)  aluminum hydroxide/magnesium hydroxide/simethicone Suspension 30 milliLiter(s) Oral every 4 hours PRN Dyspepsia  dextrose Oral Gel 15 Gram(s) Oral once PRN Blood Glucose LESS THAN 70 milliGRAM(s)/deciliter  melatonin 3 milliGRAM(s) Oral at bedtime PRN Insomnia  ondansetron Injectable 4 milliGRAM(s) IV Push every 6 hours PRN Nausea and/or Vomiting    Allergies  No Known Allergies    Vital Signs Last 24 Hrs  T(C): 36.3 (11 May 2022 04:48), Max: 36.7 (10 May 2022 13:22)  T(F): 97.4 (11 May 2022 04:48), Max: 98 (10 May 2022 13:22)  HR: 64 (11 May 2022 04:48) (64 - 85)  BP: 121/62 (11 May 2022 04:48) (121/62 - 169/90)  BP(mean): --  RR: 19 (11 May 2022 04:48) (18 - 20)  SpO2: 95% (11 May 2022 04:48) (95% - 99%)  I&O's Summary    Weight (kg): 136.1 (05-10 @ 13:22)    TELE:  Not on telemetry   PHYSICAL EXAM:  Constitutional: NAD, awake and alert, Obese   HEENT: Moist Mucous Membranes, Anicteric  Pulmonary: Non-labored, breath sounds are clear bilaterally, No wheezing, rales or rhonchi  Cardiovascular: Regular, S1 and S2, No murmurs, No rubs, gallops or clicks  Gastrointestinal:  soft, nontender, nondistended   Lymph: No peripheral edema. No lymphadenopathy.   Skin: No visible rashes Right foot DSD intact.   Psych:  Mood & affect appropriate      LABS: All Labs Reviewed:                        13.1   7.53  )-----------( 229      ( 11 May 2022 08:33 )             39.7                         14.3   7.98  )-----------( 245      ( 10 May 2022 14:58 )             43.1     11 May 2022 08:33    140    |  105    |  18     ----------------------------<  126    4.0     |  31     |  1.20   10 May 2022 14:58    138    |  105    |  20     ----------------------------<  195    4.3     |  29     |  1.10     Ca    9.2        11 May 2022 08:33  Ca    9.1        10 May 2022 14:58  Mg     2.0       11 May 2022 08:33    TPro  8.0    /  Alb  3.5    /  TBili  0.5    /  DBili  x      /  AST  13     /  ALT  18     /  AlkPhos  107    10 May 2022 14:58   LIVER FUNCTIONS - ( 10 May 2022 14:58 )  Alb: 3.5 g/dL / Pro: 8.0 g/dL / ALK PHOS: 107 U/L / ALT: 18 U/L / AST: 13 U/L / GGT: x           PT/INR - ( 10 May 2022 14:58 )   PT: 12.0 sec;   INR: 1.03 ratio         PTT - ( 10 May 2022 14:58 )  PTT:29.0 secLactate, Blood: 0.8 mmol/L (05-10-22 @ 15:09)        Patient name: LUTHER NORIEGA  YOB: 1965   Age: 51 (M)   MR#: [id number]  Study Date: 7/27/2017  Location: O/PSonographer: Susie Zuleta RDCS  Study quality: Technically difficult  Referring Physician: Kashmir Ochoa MD  Blood Pressure: 150/77 mmHg  Height: 185 cm  Weight: 136 kg  BSA: 2.6 m2  ------------------------------------------------------------------------  PROCEDURE: Transthoracic echocardiogram with 2-D, M-Mode  and complete spectral and color flow Doppler. Intravenous  catheter inserted. Verbal consent was obtained for  injection of echo contrast following a discussion of risks  and benefits. Following intravenous injection of contrast,  harmonic imaging was performed.  INDICATION: Primary pulmonary hypertension(I27.0)  ------------------------------------------------------------------------  Dimensions:    Normal Values:  LA:     4.0    2.0 - 4.0 cm  Ao:     3.5    2.0 - 3.8 cm  SEPTUM: 1.1    0.6 - 1.2 cm  PWT:    1.2    0.6 - 1.1 cm  LVIDd:  4.9    3.0 -5.6 cm  LVIDs:  3.3    1.8 - 4.0 cm  Derived variables:  LVMI: 83 g/m2  RWT: 0.48  Fractional short: 33 %  EF (Teicholtz): 61 %  ------------------------------------------------------------------------  Observations:  Mitral Valve: Normal mitral valve. Minimal mitral  regurgitation.  Aortic Valve/Aorta: Normal trileaflet aortic valve.  Aortic Root: 3.5 cm.  Left Atrium: Normal left atrium.  LA volume index = 20  cc/m2.  Left Ventricle: Normal left ventricular systolic function.  No segmental wallmotion abnormalities. Endocardial  visualization enhanced with intravenous injection of echo  contrast (Definity). Increased relative wall thickness with  normal left ventricular mass index, consistent with  concentric left ventricular remodeling.  Right Heart: Normal right atrium. Right ventricular  enlargement with normal right ventricular systolic  function. Normal tricuspid valve. Minimal tricuspid  regurgitation. Normal pulmonic valve.  Pericardium/Pleura: Normal pericardium with no pericardial  effusion.  Hemodynamic: Estimated right atrial pressure is 8 mm Hg.  Estimated right ventricular systolic pressure equals 29 mm  Hg, assuming right atrial pressure equals 8 mm Hg,  consistent with normal pulmonary pressures.  ------------------------------------------------------------------------  Conclusions:  1. Increased relative wall thickness with normal left  ventricular mass index, consistent with concentric left  ventricular remodeling.  2. Normal left ventricular systolic function. No segmental  wall motion abnormalities. Endocardial visualization  enhanced with intravenous injection of echo contrast  (Definity).  ------------------------------------------------------------------------  Confirmed on  7/27/2017 - 13:49:24 by RASHEED Del Cid  ------------------------------------------------------------------------

## 2022-05-11 NOTE — PROGRESS NOTE ADULT - ASSESSMENT
57 y/o M with HTN, HLD, T1DM (on Insulin pump), CKD s/p renal transplant, CVA with residual, DVT s/p Eliquis, CARMEN (on nocturnal CPAP), Restrictive Lung Disease, squamous cell carcinoma OM Left 4th toe s/p amputation, and right 2nd toe amputation presented to the ED for right 5th toe ulcer.  Planned for right foot 5th metatarsal head resection & 5th metatarsal base exostectomy in 5/11.     HTN, HLD, Rt foot ulcer   - -160s  - Continue losartan, clonidine, hydralazine and metoprolol     - Continue aspirin and Lipitor   - No evidence of any active ischemia     - TTE 07/2017 showed conc LVH, normal LV size and function  - No evidence of any meaningful volume overload     - No evidence of significant arrhythmia     - Podia eval noted for Rt foot ulcer. Planned for right foot 5th metatarsal head resection & 5th metatarsal base exostectomy in am, 5/11.  - Continue antibiotics   - No known cardiac disease, no cardiac arrhythmias, cardiac ischemic, severe cardiac murmur or evidence of volume overload.  He is considered optimized to undergo a low risk non-cardiac procedure.    - Monitor and replete lytes, keep K>4, Mg>2.  - Will continue to follow.    Jessica Wu, MS FNP, [facility]  Nurse Practitioner- Cardiology   Spectra # [id number] /[phone]

## 2022-05-11 NOTE — DIETITIAN INITIAL EVALUATION ADULT - OTHER INFO
56 year old male Dx DM foot infection 5th right toe OM PMH DVT CVA CKD DM type 1 HTN hyperlipidemia , patient for OR today   no food allergies  no recent weight change usual wt 300# verbal discussion on portion control and exercise when approved by MD

## 2022-05-11 NOTE — DIETITIAN INITIAL EVALUATION ADULT - PERTINENT LABORATORY DATA
05-11    140  |  105  |  18  ----------------------------<  126<H>  4.0   |  31  |  1.20    Ca    9.2      11 May 2022 08:33  Mg     2.0     05-11    TPro  8.0  /  Alb  3.5  /  TBili  0.5  /  DBili  x   /  AST  13<L>  /  ALT  18  /  AlkPhos  107  05-10  POCT Blood Glucose.: 131 mg/dL (05-11-22 @ 11:40)  A1C with Estimated Average Glucose Result: 10.4 % (05-10-22 @ 23:22)  A1C with Estimated Average Glucose Result: 9.4 % (02-10-22 @ 12:00)  A1C with Estimated Average Glucose Result: 7.8 % (09-20-21 @ 08:52)

## 2022-05-11 NOTE — DIETITIAN INITIAL EVALUATION ADULT - NSICDXPASTMEDICALHX_GEN_ALL_CORE_FT
PAST MEDICAL HISTORY:  CKD (chronic kidney disease) Renal Transplant 2013 at [facility]    CVA (cerebral vascular accident) Wallenberg Stroke  low L body sensation  low R face sensation  decreased peripheral vision  poor balance    Diabetes mellitus Type 1 on insulin pump    Diabetic peripheral neuropathy     History of DVT (deep vein thrombosis) s/p eliquis    Hyperlipidemia     Hypertension     Obesity (BMI 30-39.9)     Recurrent squamous cell carcinoma of skin nose, L arm    Squamous cell carcinoma of skin of left ear

## 2022-05-11 NOTE — DIETITIAN INITIAL EVALUATION ADULT - EDUCATION DIETARY MODIFICATIONS
verbal review of cc diet portion control whole grains exercise when approved by MD/(1) partially meets; needs review/practice/verbalization

## 2022-05-11 NOTE — CONSULT NOTE ADULT - SUBJECTIVE AND OBJECTIVE BOX
Patient is a 56y old  Male who presents with a chief complaint of Type 2 diabetes mellitus with other skin complication  (11 May 2022 14:15)    HPI:  This is a 55 y/o M with HTN, HLD, T1DM (on Insulin pump), CKD s/p renal transplant, CVA with residual, DVT s/p Eliquis, CARMEN (on nocturnal CPAP), Restrictive Lung Disease, squamous cell carcinoma OM Left 4th toe s/p amputation, and right 2nd toe amputation presented to the ED for right 5th toe ulcer.  Planned for right foot 5th metatarsal head resection & 5th metatarsal base exostectomy in am, . (10 May 2022 16:13)    Renal consult called for CKD, h/o Kidney transplant. Pt s/p LRD Kidney transplant in  at [facility].       PAST MEDICAL HISTORY:  Hypertension    Hyperlipidemia    Diabetes mellitus    CKD (chronic kidney disease)    Diabetic peripheral neuropathy    Obesity (BMI 30-39.9)    CVA (cerebral vascular accident)    Squamous cell carcinoma of skin of left ear    History of DVT (deep vein thrombosis)    Recurrent squamous cell carcinoma of skin        PAST SURGICAL HISTORY:  Toe infection    Ear disease    Vasectomy status    H/O foot surgery    End-stage renal failure with renal transplant    S/P kidney transplant    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        FAMILY HISTORY:  Family history of diabetes mellitus (DM)    FH: skin cancer        SOCIAL HISTORY:    Allergies    No Known Allergies    Intolerances      Home Medications:  aspirin 81 mg oral tablet: 2 tab(s) orally once a day (10 May 2022 16:59)  azaTHIOprine 50 mg oral tablet: 2 tab(s) orally once a day (10 May 2022 16:59)  Bactrim 400 mg-80 mg oral tablet: 1 tab(s) orally once a day (10 May 2022 16:59)  cloNIDine 0.1 mg oral tablet: 1 tab(s) orally 2 times a day (10 May 2022 16:59)  gabapentin 300 mg oral capsule: 1 cap(s) orally 2 times a day (10 May 2022 16:59)  HumaLOG 100 units/mL injectable solution: inject  units per day per insulin pump    3units/hr via pump (10 May 2022 16:59)  hydrALAZINE 25 mg oral tablet: 1 tab(s) orally 3 times a day (10 May 2022 16:59)  losartan 25 mg oral tablet: 1 tab(s) orally once a day (10 May 2022 16:59)  lovastatin 40 mg oral tablet: 1 tab(s) orally once a day (10 May 2022 16:59)  Metoprolol Tartrate 25 mg oral tablet: 3 tab(s) orally 2 times a day     (10 May 2022 16:59)  tacrolimus 0.5 mg oral capsule: 1 cap(s) orally every 12 hours (9:30am and 9:30pm)    *takes along with 1mg for a TDD: 1.5mg * (10 May 2022 16:59)  tacrolimus 1 mg oral capsule: 1 cap(s) orally every 12 hours at (9am and 9pm)      takes along with 0.5mg for a TDD: 1.5mg  (10 May 2022 16:59)    MEDICATIONS  (STANDING):  aspirin enteric coated 162 milliGRAM(s) Oral daily  atorvastatin 40 milliGRAM(s) Oral at bedtime  azaTHIOprine 100 milliGRAM(s) Oral daily  cloNIDine 0.1 milliGRAM(s) Oral two times a day  dextrose 5%. 1000 milliLiter(s) (50 mL/Hr) IV Continuous <Continuous>  dextrose 5%. 1000 milliLiter(s) (100 mL/Hr) IV Continuous <Continuous>  dextrose 50% Injectable 25 Gram(s) IV Push once  dextrose 50% Injectable 12.5 Gram(s) IV Push once  dextrose 50% Injectable 25 Gram(s) IV Push once  gabapentin 300 milliGRAM(s) Oral two times a day  glucagon  Injectable 1 milliGRAM(s) IntraMuscular once  hydrALAZINE 25 milliGRAM(s) Oral three times a day  insulin lispro (HumaLOG) Pump 1 Each SubCutaneous Continuous Pump  losartan 25 milliGRAM(s) Oral daily  metoprolol tartrate 75 milliGRAM(s) Oral every 12 hours  piperacillin/tazobactam IVPB.. 3.375 Gram(s) IV Intermittent every 8 hours  tacrolimus 0.5 milliGRAM(s) Oral <User Schedule>  tacrolimus 1 milliGRAM(s) Oral <User Schedule>  trimethoprim   80 mG/sulfamethoxazole 400 mG 1 Tablet(s) Oral daily    MEDICATIONS  (PRN):  acetaminophen     Tablet .. 650 milliGRAM(s) Oral every 6 hours PRN Temp greater or equal to 38C (100.4F), Mild Pain (1 - 3)  aluminum hydroxide/magnesium hydroxide/simethicone Suspension 30 milliLiter(s) Oral every 4 hours PRN Dyspepsia  dextrose Oral Gel 15 Gram(s) Oral once PRN Blood Glucose LESS THAN 70 milliGRAM(s)/deciliter  melatonin 3 milliGRAM(s) Oral at bedtime PRN Insomnia  ondansetron Injectable 4 milliGRAM(s) IV Push every 6 hours PRN Nausea and/or Vomiting      REVIEW OF SYSTEMS:  General: no distress  Respiratory: No cough, SOB  Cardiovascular: No CP or Palpitations	  Gastrointestinal: No nausea, Vomiting. No diarrhea  Genitourinary: No urinary complaints	  Musculoskeletal: No new rash or lesions	  all other systems negative    T(F): 97.5 (22 @ 12:30), Max: 98 (05-10-22 @ 20:46)  HR: 66 (22 @ 12:30) (64 - 78)  BP: 120/66 (22 @ 12:30) (120/66 - 169/90)  RR: 18 (22 @ 12:30) (18 - 20)  SpO2: 92% (22 @ 12:30) (92% - 96%)  Wt(kg): --    PHYSICAL EXAM:  General: NAD  Respiratory: b/l air entry  Cardiovascular: S1 S2  Gastrointestinal: soft  Extremities: rt foot dressing            140  |  105  |  18  ----------------------------<  126<H>  4.0   |  31  |  1.20    Ca    9.2      11 May 2022 08:33  Mg     2.0         TPro  8.0  /  Alb  3.5  /  TBili  0.5  /  DBili  x   /  AST  13<L>  /  ALT  18  /  AlkPhos  107  05-10                          13.1   7.53  )-----------( 229      ( 11 May 2022 08:33 )             39.7       Potassium, Serum: 4.0 mmol/L ( @ 08:33)  Blood Urea Nitrogen, Serum: 18 mg/dL ( @ 08:33)  Calcium, Total Serum: 9.2 mg/dL ( @ 08:33)  Hemoglobin: 13.1 g/dL ( @ 08:33)      Creatinine, Serum: 1.20 (05-11 @ 08:33)  Creatinine, Serum: 1.10 (05-10 @ 14:58)      Urinalysis Basic - ( 10 May 2022 18:42 )    Color: Yellow / Appearance: Clear / S.015 / pH: x  Gluc: x / Ketone: Trace  / Bili: Negative / Urobili: Negative   Blood: x / Protein: 30 mg/dL / Nitrite: Negative   Leuk Esterase: Negative / RBC: 0-2 /HPF / WBC Negative   Sq Epi: x / Non Sq Epi: Occasional / Bacteria: Occasional      LIVER FUNCTIONS - ( 10 May 2022 14:58 )  Alb: 3.5 g/dL / Pro: 8.0 g/dL / ALK PHOS: 107 U/L / ALT: 18 U/L / AST: 13 U/L / GGT: x                       < from: Xray Foot AP + Lateral + Oblique, Right (05.10.22 @ 15:40) >    ACC: [id number] EXAM:  XR FOOT COMP MIN 3 VIEWS RT                          PROCEDURE DATE:  05/10/2022          INTERPRETATION:  Right foot. 3 views. Patient has a diabetic foot ulcer.    Again noted is healed amputation of the distal third and fourth toes.    There is dorsal soft tissue swelling increased from  of this   year.    There is an inferior calcaneal spur there is rather mild ankle   degeneration.    An orthopedic anchor on the outer navicular bone again seen.    No bone destruction or fracture is evident.    There appears to be a soft tissue ulcer of the mid plantar soft tissues   new since .    IMPRESSION: Plantar ulcer new since prior. Dorsal soft tissue swelling.   No active bone finding.    --- End of Report ---            YURIY JONES MD; Attending Radiologist  This document has been electronically signed. May 11 2022  8:57AM    < end of copied text >  < from: Xray Chest 1 View- PORTABLE-Urgent (05.10.22 @ 15:39) >    ACC: [id number] EXAM:  XR CHEST PORTABLE URGENT 1V                          PROCEDURE DATE:  05/10/2022          INTERPRETATION:  TIME OF EXAM: May 10, 2022 at 3:08 PM.    CLINICAL INFORMATION: Admission chest x-ray.    COMPARISON:  2022.    TECHNIQUE:   AP Portable chest x-ray.    INTERPRETATION:    Heart size and the mediastinum cannot be accurately evaluated on this   projection. Left-sided loop recorder again noted.  Continued elevation of right hemidiaphragm.  There is hazy right basilar opacity. The left lung is clear.  No left pleural effusion.  No pneumothorax.  No acute bony abnormality.      IMPRESSION:  Persistently elevated right hemidiaphragm.    Hazy right basilar opacity, possibly subsegmental atelectasis or a small   right pleural effusion with associated passive atelectasis.    --- End of Report ---            GREGORY IRAHETA MD; Attending Radiologist  This document has been electronically signed. May 10 2022  4:17PM    < end of copied text >

## 2022-05-11 NOTE — CONSULT NOTE ADULT - CONSULT REQUESTED DATE/TIME
10-May-2022
11-May-2022 15:27
10-May-2022 15:03
11-May-2022 07:52
10-May-2022 15:08
11-May-2022 10:38

## 2022-05-11 NOTE — DIETITIAN INITIAL EVALUATION ADULT - SIGNS/SYMPTOMS
as evidenced by obesity BMI 39.5 with limited physical activity & energy intake > needs as evidenced by elevated HgbA1c patient type 1 DM

## 2022-05-11 NOTE — PROGRESS NOTE ADULT - SUBJECTIVE AND OBJECTIVE BOX
[facility] DIVISION of INFECTIOUS DISEASE  Marko Saucedo MD PhD, Qiana Steven MD, Claire Castro MD, Tal Russo MD, David Bravo MD  and providing coverage with Krista Del Valle MD  Providing Infectious Disease Consultations at [facility], [facility]'s    Office# [phone] to schedule follow up appointments  Answering Service for urgent calls or New Consults [phone]  Cell# to text for urgent issues Marko Saucedo [phone]     infectious diseases progress note:    LUTHER NORIEGA is a 56y y. o. Male patient    No concerning overnight events    Allergies    No Known Allergies    Intolerances        ANTIBIOTICS/RELEVANT:  antimicrobials  piperacillin/tazobactam IVPB.. 3.375 Gram(s) IV Intermittent every 8 hours  trimethoprim   80 mG/sulfamethoxazole 400 mG 1 Tablet(s) Oral daily    immunologic:  azaTHIOprine 100 milliGRAM(s) Oral daily  tacrolimus 0.5 milliGRAM(s) Oral <User Schedule>  tacrolimus 1 milliGRAM(s) Oral <User Schedule>    OTHER:  acetaminophen     Tablet .. 650 milliGRAM(s) Oral every 6 hours PRN  aluminum hydroxide/magnesium hydroxide/simethicone Suspension 30 milliLiter(s) Oral every 4 hours PRN  aspirin enteric coated 162 milliGRAM(s) Oral daily  atorvastatin 40 milliGRAM(s) Oral at bedtime  cloNIDine 0.1 milliGRAM(s) Oral two times a day  dextrose 5%. 1000 milliLiter(s) IV Continuous <Continuous>  dextrose 5%. 1000 milliLiter(s) IV Continuous <Continuous>  dextrose 50% Injectable 25 Gram(s) IV Push once  dextrose 50% Injectable 12.5 Gram(s) IV Push once  dextrose 50% Injectable 25 Gram(s) IV Push once  dextrose Oral Gel 15 Gram(s) Oral once PRN  gabapentin 300 milliGRAM(s) Oral two times a day  glucagon  Injectable 1 milliGRAM(s) IntraMuscular once  hydrALAZINE 25 milliGRAM(s) Oral three times a day  insulin lispro (HumaLOG) Pump 1 Each SubCutaneous Continuous Pump  losartan 25 milliGRAM(s) Oral daily  melatonin 3 milliGRAM(s) Oral at bedtime PRN  metoprolol tartrate 75 milliGRAM(s) Oral every 12 hours  ondansetron Injectable 4 milliGRAM(s) IV Push every 6 hours PRN      Objective:  Vital Signs Last 24 Hrs  T(C): 36.3 (11 May 2022 04:48), Max: 36.7 (10 May 2022 13:22)  T(F): 97.4 (11 May 2022 04:48), Max: 98 (10 May 2022 13:22)  HR: 64 (11 May 2022 04:48) (64 - 85)  BP: 121/62 (11 May 2022 04:48) (121/62 - 169/90)  BP(mean): --  RR: 19 (11 May 2022 04:48) (18 - 20)  SpO2: 95% (11 May 2022 04:48) (95% - 99%)    T(C): 36.3 (22 @ 04:48), Max: 36.7 (05-10-22 @ 13:22)  T(C): 36.3 (22 @ 04:48), Max: 36.7 (05-10-22 @ 13:22)  T(C): 36.3 (22 @ 04:48), Max: 36.7 (05-10-22 @ 13:22)    PHYSICAL EXAM:  HEENT: NC atraumatic  Neck: supple  Respiratory: no accessory muscle use, breathing comfortably  Cardiovascular: distant  Gastrointestinal: normal appearing, nondistended  Extremities: no clubbing, no cyanosis, foot is wrapped        LABS:                          13.1   7.53  )-----------( 229      ( 11 May 2022 08:33 )             39.7       WBC  7.53  @ 08:33  7.98 05-10 @ 14:58          140  |  105  |  18  ----------------------------<  126<H>  4.0   |  31  |  1.20    Ca    9.2      11 May 2022 08:33  Mg     2.0         TPro  8.0  /  Alb  3.5  /  TBili  0.5  /  DBili  x   /  AST  13<L>  /  ALT  18  /  AlkPhos  107  05-10      Creatinine, Serum: 1.20 mg/dL (22 @ 08:33)  Creatinine, Serum: 1.10 mg/dL (05-10-22 @ 14:58)      PT/INR - ( 10 May 2022 14:58 )   PT: 12.0 sec;   INR: 1.03 ratio         PTT - ( 10 May 2022 14:58 )  PTT:29.0 sec  Urinalysis Basic - ( 10 May 2022 18:42 )    Color: Yellow / Appearance: Clear / S.015 / pH: x  Gluc: x / Ketone: Trace  / Bili: Negative / Urobili: Negative   Blood: x / Protein: 30 mg/dL / Nitrite: Negative   Leuk Esterase: Negative / RBC: 0-2 /HPF / WBC Negative   Sq Epi: x / Non Sq Epi: Occasional / Bacteria: Occasional            INFLAMMATORY MARKERS  Auto Lymphocyte #: 1.13 K/uL (05-10-22 @ 14:58)  Auto Neutrophil #: 5.92 K/uL (05-10-22 @ 14:58)    Lactate, Blood: 0.8 mmol/L (05-10-22 @ 15:09)    Auto Eosinophil #: 0.29 K/uL (05-10-22 @ 14:58)      Sedimentation Rate, Erythrocyte: 25 mm/hr (22 @ 08:33)  Sedimentation Rate, Erythrocyte: 25 mm/hr (05-10-22 @ 14:58)    Procalcitonin, Serum: <0.05 (22 @ 08:33)                  Activated Partial Thromboplastin Time: 29.0 sec (05-10-22 @ 14:58)  INR: 1.03 ratio (05-10-22 @ 14:58)          MICROBIOLOGY:              RADIOLOGY & ADDITIONAL STUDIES:

## 2022-05-11 NOTE — CONSULT NOTE ADULT - ASSESSMENT
CKD 3, h/o Kidney transplant 2013 LRD  Diabetes, Diabetic foot ulcer  Hypertension    Renal indices at baseline. To continue current immuno suppressant regimen. Check tacrolimus level.   Podiatry follow up. Monitor blood sugar levels. Insulin coverage as needed. Dietary restriction.   Monitor BP trend. Titrate BP meds as needed. Salt restriction. Will follow electrolytes and renal function trend.   Further recommendations pending clinical course. Thank you for the courtesy of this referral.

## 2022-05-11 NOTE — DIETITIAN INITIAL EVALUATION ADULT - ETIOLOGY
related to endocrine dysfunction  related to excessive energy intake and decreased physical activity

## 2022-05-11 NOTE — DIETITIAN INITIAL EVALUATION ADULT - NS FNS DIET ORDER
Diet, NPO after Midnight:      NPO Start Date: 10-May-2022,   NPO Start Time: 23:59  Except Medications (05-10-22 @ 17:09)  Diet, Consistent Carbohydrate w/Evening Snack (05-10-22 @ 14:27)

## 2022-05-11 NOTE — HISTORY OF PRESENT ILLNESS
[FreeTextEntry1] : DFU 3 plantar right base 5th metatarsal and lateral head of the 5th metatarsal left foot , no soi , 5th met head probes to bone . Patient to have surgical resection next week but patient notes that he noticed his right foot becoming swollen and red so he relates that he came to have it examined. Denies any other complaints at this time.

## 2022-05-11 NOTE — CONSULT NOTE ADULT - CONSULT REASON
Right foot infected ulcer
Cardiac Optimization
DM foot ulcer
56y A1C with Estimated Average Glucose Result: 10.4 % (05-10-22 @ 23:22)  A1C with Estimated Average Glucose Result: 9.4 % (02-10-22 @ 12:00)   diabetes mellitus uncontrolled type 1
CKD, h/o Kidney transplant
dm2 uncontrolled

## 2022-05-11 NOTE — PROGRESS NOTE ADULT - ASSESSMENT
This is a 57 y/o M with HTN, HLD, T1DM (on Insulin pump), CKD s/p renal transplant, CVA with residual, DVT s/p Eliquis, CARMEN (on nocturnal CPAP), Restrictive Lung Disease, squamous cell carcinoma OM Left 4th toe s/p amputation, and right 2nd toe amputation presented to the ED for right 5th toe ulcer.  Planned for right foot 5th metatarsal head resection & 5th metatarsal base exostectomy in am, 5/11.    R 5th toe DFU/OM  - seen by podiatry; Planned for Right foot 5th metatarsal head resection & 5th metatarsal base exostectomy tomorrow with Dr. Catherine at 5pm   - infectious w/u pending    --BCx pending  - imaging reviewed    -- DVT study negative   -- MRI pending  Plan:   with prior enterococcus agree with C/w zosyn  Appreciate podiatry recs  plan for OR 5/11    Hx of renal txp  -c/w bactrim for PJP PPx    Thank you for consulting us and involving us in the management of this most interesting and challenging case.  We will follow along in the care of this patient. Please call us at [phone] or text me directly on my cell# at [phone] with any concerns.

## 2022-05-11 NOTE — BRIEF OPERATIVE NOTE - NSICDXBRIEFPROCEDURE_GEN_ALL_CORE_FT
PROCEDURES:  Resection of head of fifth metatarsal bone 11-May-2022 18:24:11  Lalita Jamison  Resection of metatarsal bone 11-May-2022 18:24:41  Lalita Jamison

## 2022-05-11 NOTE — PHYSICAL EXAM
[4 x 4] : 4 x 4  [Abdominal Pad] : Abdominal Pad [0] : left 0 [1+] : left 1+ [Skin Ulcer] : ulcer [Alert] : alert [Oriented to Person] : oriented to person [Oriented to Place] : oriented to place [Oriented to Time] : oriented to time [Calm] : calm [Varicose Veins Of Lower Extremities] : not present [] : not present [Purpura] : no purpura  [Petechiae] : no petechiae [Skin Induration] : no induration [de-identified] : adult WM, NAD, alert, Ox 3. [de-identified] : HTN, HLD [de-identified] : Diabetic Charcot right foot deformity [de-identified] : right foot cellulitic changes noted. right foot plantar ulcer down to skin, subcutaneous tissue, fat.  right lateral 5th metatarsal ulcer down to skin, subcutaneous tissue, and fat. [de-identified] : Diabetic neuropathy  [FreeTextEntry1] : Plantar Foot  [FreeTextEntry2] : 2.6 [FreeTextEntry3] : 2.5 [FreeTextEntry4] : 0.2 [de-identified] : Serosanguineous  [de-identified] : Callus with mild erythema and swelling  [de-identified] : Silver Alginate today [de-identified] : Mechanically cleansed with 0.9%normal saline and sterile gauze\par  [FreeTextEntry7] :  Lateral Foot 5th Met  [FreeTextEntry8] : 1.3 [FreeTextEntry9] : 1.2 [de-identified] : 0.2 [de-identified] : Serosanguineous  [de-identified] : Callus with mild erythema and swelling  [de-identified] : 90% [de-identified] : 10% [de-identified] : Silver Alginate today [de-identified] : Mechanically cleansed with 0.9%normal saline and sterile gauze\par  [TWNoteComboBox1] : Right [TWNoteComboBox4] : Moderate [de-identified] : other [de-identified] : None [de-identified] : None [de-identified] : 100% [de-identified] : No [de-identified] : Daily [de-identified] : Primary Dressing [TWNoteComboBox9] : Right [de-identified] : Moderate [de-identified] : other [de-identified] : None [de-identified] : None [de-identified] : Yes [de-identified] : 3x Weekly [de-identified] : Primary Dressing

## 2022-05-11 NOTE — CONSULT NOTE ADULT - SUBJECTIVE AND OBJECTIVE BOX
Patient is a 56y old  Male who presents with a chief complaint of R 5th toe infection (10 May 2022 16:13)      Reason For Consult: dm1 uncontrolled    HPI:  This is a 57 y/o M with HTN, HLD, T1DM (on Insulin pump), CKD s/p renal transplant, CVA with residual, DVT s/p Eliquis, CARMEN (on nocturnal CPAP), Restrictive Lung Disease, squamous cell carcinoma OM Left 4th toe s/p amputation, and right 2nd toe amputation presented to the ED for right 5th toe ulcer.  Planned for right foot 5th metatarsal head resection & 5th metatarsal base exostectomy in am, 5/11. (10 May 2022 16:13)      PAST MEDICAL & SURGICAL HISTORY:  Hypertension      Hyperlipidemia      Diabetes mellitus  Type 1 on insulin pump      CKD (chronic kidney disease)  Renal Transplant 2013 at [facility]      Diabetic peripheral neuropathy      Obesity (BMI 30-39.9)      CVA (cerebral vascular accident)  Wallenberg Stroke  low L body sensation  low R face sensation  decreased peripheral vision  poor balance      Squamous cell carcinoma of skin of left ear      History of DVT (deep vein thrombosis)  s/p eliquis      Recurrent squamous cell carcinoma of skin  nose, L arm      Toe infection  2007 L 4th Toe surgery-amputation secondary to osteomyelitis      Ear disease  Left inner ear surgery, 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, Can NOT have MRI      Vasectomy status  s/p b/l  vasectomy      H/O foot surgery  2005 - right foot - bone fracture - s/p ORIF and subsequent removal of hardware      End-stage renal failure with renal transplant  12/2013      S/P kidney transplant      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          FAMILY HISTORY:  Family history of diabetes mellitus (DM)    FH: skin cancer          Social History:    MEDICATIONS  (STANDING):  aspirin enteric coated 162 milliGRAM(s) Oral daily  atorvastatin 40 milliGRAM(s) Oral at bedtime  azaTHIOprine 100 milliGRAM(s) Oral daily  cloNIDine 0.1 milliGRAM(s) Oral two times a day  dextrose 5%. 1000 milliLiter(s) (50 mL/Hr) IV Continuous <Continuous>  dextrose 5%. 1000 milliLiter(s) (100 mL/Hr) IV Continuous <Continuous>  dextrose 50% Injectable 25 Gram(s) IV Push once  dextrose 50% Injectable 12.5 Gram(s) IV Push once  dextrose 50% Injectable 25 Gram(s) IV Push once  gabapentin 300 milliGRAM(s) Oral two times a day  glucagon  Injectable 1 milliGRAM(s) IntraMuscular once  hydrALAZINE 25 milliGRAM(s) Oral three times a day  insulin lispro (ADMELOG) corrective regimen sliding scale   SubCutaneous three times a day before meals  insulin lispro (ADMELOG) corrective regimen sliding scale   SubCutaneous at bedtime  losartan 25 milliGRAM(s) Oral daily  metoprolol tartrate 75 milliGRAM(s) Oral every 12 hours  piperacillin/tazobactam IVPB.. 3.375 Gram(s) IV Intermittent every 8 hours  tacrolimus 0.5 milliGRAM(s) Oral <User Schedule>  tacrolimus 1 milliGRAM(s) Oral <User Schedule>  trimethoprim   80 mG/sulfamethoxazole 400 mG 1 Tablet(s) Oral daily    MEDICATIONS  (PRN):  acetaminophen     Tablet .. 650 milliGRAM(s) Oral every 6 hours PRN Temp greater or equal to 38C (100.4F), Mild Pain (1 - 3)  aluminum hydroxide/magnesium hydroxide/simethicone Suspension 30 milliLiter(s) Oral every 4 hours PRN Dyspepsia  dextrose Oral Gel 15 Gram(s) Oral once PRN Blood Glucose LESS THAN 70 milliGRAM(s)/deciliter  melatonin 3 milliGRAM(s) Oral at bedtime PRN Insomnia  ondansetron Injectable 4 milliGRAM(s) IV Push every 6 hours PRN Nausea and/or Vomiting        T(C): 36.3 (05-11-22 @ 04:48), Max: 36.7 (05-10-22 @ 13:22)  HR: 64 (05-11-22 @ 04:48) (64 - 85)  BP: 121/62 (05-11-22 @ 04:48) (121/62 - 169/90)  RR: 19 (05-11-22 @ 04:48) (18 - 20)  SpO2: 95% (05-11-22 @ 04:48) (95% - 99%)  Wt(kg): --    PHYSICAL EXAM:  CHEST/LUNG: Clear to percussion bilaterally; No rales, rhonchi, wheezing, or rubs  HEART: Regular rate and rhythm; No murmurs, rubs, or gallops  ABDOMEN: Soft, Nontender, Nondistended; Bowel sounds present  EXTREMITIES:  2+ Peripheral Pulses, No clubbing, cyanosis, or edema      CAPILLARY BLOOD GLUCOSE      POCT Blood Glucose.: 134 mg/dL (11 May 2022 06:37)  POCT Blood Glucose.: 222 mg/dL (10 May 2022 21:27)                            14.3   7.98  )-----------( 245      ( 10 May 2022 14:58 )             43.1       CMP:  05-10 @ 14:58  SGPT 18  Albumin 3.5   Alk Phos 107   Anion Gap 4   SGOT 13   Total Bili 0.5   BUN 20   Calcium Total 9.1   CO2 29   Chloride 105   Creatinine 1.10   eGFR if AA --   eGFR if non AA --   Glucose 195   Potassium 4.3   Protein 8.0   Sodium 138      Thyroid Function Tests:      Diabetes Tests:       Radiology:

## 2022-05-11 NOTE — PLAN
[FreeTextEntry1] : Patient aware of the proposed surgical procedure and is in agreement with the plan and is aware of the complications, sequela,and post op recovery. Pt sent to the emergency room at this time. All questions answered Spent 30 minutes for patient care and medical decision making.\par

## 2022-05-11 NOTE — ASSESSMENT
[Verbal] : Verbal [Demo] : Demo [Patient] : Patient [Good - alert, interested, motivated] : Good - alert, interested, motivated [Verbalizes knowledge/Understanding] : Verbalizes knowledge/understanding [Dressing changes] : dressing changes [Foot Care] : foot care [Skin Care] : skin care [Signs and symptoms of infection] : sign and symptoms of infection [Nutrition] : nutrition [How and When to Call] : how and when to call [Pain Management] : pain management [Off-loading] : off-loading [Patient responsibility to plan of care] : patient responsibility to plan of care [Glycemic Control] : glycemic control [Stable] : stable [Home] : Home [Ambulatory] : Ambulatory [] : No [FreeTextEntry2] : Pressure Relief \par Localized Wound Care\par Infection Prevention \par Surgical procedure\par Hospitalization\par F/U after discharge from [facility]   [FreeTextEntry4] : Patient came in due to increased drainage, pain and swelling in foot. \par Patient transferred to [place] ED for further evaluation.\par F/U after discharge from [facility]  \par

## 2022-05-11 NOTE — DIETITIAN INITIAL EVALUATION ADULT - ORAL INTAKE PTA/DIET HISTORY
patient reports with good PO intake at home. states concentrates on vegetables and lean proteins bakes most food. patient has been DM type 1 for 30 years with insulin pump.. follows blood sugars on phone have been higher later with infection, usually in 120 range. A1c 8% range previously per patient  patient states for 5 months limited activity

## 2022-05-11 NOTE — CONSULT NOTE ADULT - ASSESSMENT
Physical Exam:   Vital Signs Last 24 Hrs  T(C): 36.3 (11 May 2022 04:48), Max: 36.7 (10 May 2022 13:22)  T(F): 97.4 (11 May 2022 04:48), Max: 98 (10 May 2022 13:22)  HR: 64 (11 May 2022 04:48) (64 - 85)  BP: 121/62 (11 May 2022 04:48) (121/62 - 169/90)  BP(mean): --  RR: 19 (11 May 2022 04:48) (18 - 20)  SpO2: 95% (11 May 2022 04:48) (95% - 99%)    General: NAD, denies Fever, chills  CVS: S1S2 no M/R/G  Resp: CTA in all fields  : no freq, no urgency, no dysuria           CAPILLARY BLOOD GLUCOSE      POCT Blood Glucose.: 134 mg/dL (11 May 2022 06:37)  POCT Blood Glucose.: 222 mg/dL (10 May 2022 21:27)      Cholesterol, Serum: 113 mg/dL (05.19.21 @ 08:36)     HDL Cholesterol, Serum: 22 mg/dL (05.19.21 @ 08:36)     LDL Cholesterol Calculated: 66 mg/dL (05.19.21 @ 08:36)     DIET: NPO

## 2022-05-11 NOTE — CONSULT NOTE ADULT - PROBLEM SELECTOR RECOMMENDATION 9
Type 1 A1c 10.4% adm DFU  continue insulin pump mini med and dexcom CGM  Recommend endocrine, RD  consent form and attestation form- obtained  FU appt: Dr Garcia  Diabetes support info given- [phone]  Goal 100-180 mg/dL

## 2022-05-12 ENCOUNTER — APPOINTMENT (OUTPATIENT)
Dept: WOUND CARE | Facility: HOSPITAL | Age: 57
End: 2022-05-12

## 2022-05-12 LAB
ANION GAP SERPL CALC-SCNC: 5 MMOL/L — SIGNIFICANT CHANGE UP (ref 5–17)
BUN SERPL-MCNC: 17 MG/DL — SIGNIFICANT CHANGE UP (ref 7–23)
CALCIUM SERPL-MCNC: 9.2 MG/DL — SIGNIFICANT CHANGE UP (ref 8.5–10.1)
CHLORIDE SERPL-SCNC: 104 MMOL/L — SIGNIFICANT CHANGE UP (ref 96–108)
CO2 SERPL-SCNC: 31 MMOL/L — SIGNIFICANT CHANGE UP (ref 22–31)
CREAT SERPL-MCNC: 1.1 MG/DL — SIGNIFICANT CHANGE UP (ref 0.5–1.3)
EGFR: 79 ML/MIN/1.73M2 — SIGNIFICANT CHANGE UP
GLUCOSE SERPL-MCNC: 160 MG/DL — HIGH (ref 70–99)
GRAM STN FLD: SIGNIFICANT CHANGE UP
GRAM STN FLD: SIGNIFICANT CHANGE UP
HCT VFR BLD CALC: 39.7 % — SIGNIFICANT CHANGE UP (ref 39–50)
HGB BLD-MCNC: 12.5 G/DL — LOW (ref 13–17)
MAGNESIUM SERPL-MCNC: 2 MG/DL — SIGNIFICANT CHANGE UP (ref 1.6–2.6)
MCHC RBC-ENTMCNC: 27.5 PG — SIGNIFICANT CHANGE UP (ref 27–34)
MCHC RBC-ENTMCNC: 31.5 GM/DL — LOW (ref 32–36)
MCV RBC AUTO: 87.3 FL — SIGNIFICANT CHANGE UP (ref 80–100)
NRBC # BLD: 0 /100 WBCS — SIGNIFICANT CHANGE UP (ref 0–0)
PLATELET # BLD AUTO: 237 K/UL — SIGNIFICANT CHANGE UP (ref 150–400)
POTASSIUM SERPL-MCNC: 4.1 MMOL/L — SIGNIFICANT CHANGE UP (ref 3.5–5.3)
POTASSIUM SERPL-SCNC: 4.1 MMOL/L — SIGNIFICANT CHANGE UP (ref 3.5–5.3)
RBC # BLD: 4.55 M/UL — SIGNIFICANT CHANGE UP (ref 4.2–5.8)
RBC # FLD: 15 % — HIGH (ref 10.3–14.5)
SODIUM SERPL-SCNC: 140 MMOL/L — SIGNIFICANT CHANGE UP (ref 135–145)
SPECIMEN SOURCE: SIGNIFICANT CHANGE UP
SPECIMEN SOURCE: SIGNIFICANT CHANGE UP
TACROLIMUS SERPL-MCNC: 4.9 NG/ML — SIGNIFICANT CHANGE UP
WBC # BLD: 7.61 K/UL — SIGNIFICANT CHANGE UP (ref 3.8–10.5)
WBC # FLD AUTO: 7.61 K/UL — SIGNIFICANT CHANGE UP (ref 3.8–10.5)

## 2022-05-12 PROCEDURE — 99232 SBSQ HOSP IP/OBS MODERATE 35: CPT

## 2022-05-12 RX ORDER — ASPIRIN/CALCIUM CARB/MAGNESIUM 324 MG
162 TABLET ORAL DAILY
Refills: 0 | Status: DISCONTINUED | OUTPATIENT
Start: 2022-05-13 | End: 2022-05-16

## 2022-05-12 RX ORDER — PIPERACILLIN AND TAZOBACTAM 4; .5 G/20ML; G/20ML
3.38 INJECTION, POWDER, LYOPHILIZED, FOR SOLUTION INTRAVENOUS EVERY 8 HOURS
Refills: 0 | Status: DISCONTINUED | OUTPATIENT
Start: 2022-05-12 | End: 2022-05-16

## 2022-05-12 RX ORDER — INSULIN LISPRO 100/ML
1 VIAL (ML) SUBCUTANEOUS
Refills: 0 | Status: DISCONTINUED | OUTPATIENT
Start: 2022-05-12 | End: 2022-05-16

## 2022-05-12 RX ADMIN — PIPERACILLIN AND TAZOBACTAM 25 GRAM(S): 4; .5 INJECTION, POWDER, LYOPHILIZED, FOR SOLUTION INTRAVENOUS at 21:34

## 2022-05-12 RX ADMIN — Medication 25 MILLIGRAM(S): at 12:42

## 2022-05-12 RX ADMIN — Medication 25 MILLIGRAM(S): at 05:29

## 2022-05-12 RX ADMIN — ATORVASTATIN CALCIUM 40 MILLIGRAM(S): 80 TABLET, FILM COATED ORAL at 21:28

## 2022-05-12 RX ADMIN — Medication 650 MILLIGRAM(S): at 18:55

## 2022-05-12 RX ADMIN — Medication 75 MILLIGRAM(S): at 18:22

## 2022-05-12 RX ADMIN — Medication 75 MILLIGRAM(S): at 05:29

## 2022-05-12 RX ADMIN — GABAPENTIN 300 MILLIGRAM(S): 400 CAPSULE ORAL at 18:21

## 2022-05-12 RX ADMIN — TACROLIMUS 1 MILLIGRAM(S): 5 CAPSULE ORAL at 09:24

## 2022-05-12 RX ADMIN — PIPERACILLIN AND TAZOBACTAM 25 GRAM(S): 4; .5 INJECTION, POWDER, LYOPHILIZED, FOR SOLUTION INTRAVENOUS at 12:43

## 2022-05-12 RX ADMIN — LOSARTAN POTASSIUM 25 MILLIGRAM(S): 100 TABLET, FILM COATED ORAL at 05:29

## 2022-05-12 RX ADMIN — Medication 25 MILLIGRAM(S): at 21:28

## 2022-05-12 RX ADMIN — Medication 1 EACH: at 07:28

## 2022-05-12 RX ADMIN — Medication 1 TABLET(S): at 12:04

## 2022-05-12 RX ADMIN — Medication 650 MILLIGRAM(S): at 12:42

## 2022-05-12 RX ADMIN — Medication 0.1 MILLIGRAM(S): at 18:21

## 2022-05-12 RX ADMIN — AZATHIOPRINE 100 MILLIGRAM(S): 100 TABLET ORAL at 12:04

## 2022-05-12 RX ADMIN — TACROLIMUS 1 MILLIGRAM(S): 5 CAPSULE ORAL at 21:28

## 2022-05-12 RX ADMIN — TACROLIMUS 0.5 MILLIGRAM(S): 5 CAPSULE ORAL at 09:25

## 2022-05-12 RX ADMIN — TACROLIMUS 0.5 MILLIGRAM(S): 5 CAPSULE ORAL at 21:28

## 2022-05-12 RX ADMIN — GABAPENTIN 300 MILLIGRAM(S): 400 CAPSULE ORAL at 05:34

## 2022-05-12 RX ADMIN — Medication 0.1 MILLIGRAM(S): at 05:29

## 2022-05-12 NOTE — PROGRESS NOTE ADULT - ASSESSMENT
This is a 55 y/o M with HTN, HLD, T1DM (on Insulin pump), CKD s/p renal transplant, CVA with residual, DVT s/p Eliquis, CARMEN (on nocturnal CPAP), Restrictive Lung Disease, squamous cell carcinoma OM Left 4th toe s/p amputation, and right 2nd toe amputation presented to the ED for right 5th toe ulcer.    Resection of head of fifth metatarsal bone 11-May-2022 18:24:11  Lalita Jamison  Resection of metatarsal bone 11-May-2022 18:24:41  Lalita Jamison.    R 5th toe DFU/OM    with prior enterococcus agree with C/w zosyn  Appreciate podiatry recs  sp OR 5/11  recs to follow based on path and micro    Hx of renal txp  -c/w bactrim for PJP PPx    Thank you for consulting us and involving us in the management of this most interesting and challenging case.  We will follow along in the care of this patient. Please call us at [phone] or text me directly on my cell# at [phone] with any concerns.

## 2022-05-12 NOTE — PROGRESS NOTE ADULT - ASSESSMENT
57 y/o M with HTN, HLD, T1DM (on Insulin pump), CKD s/p renal transplant, CVA with residual, DVT s/p Eliquis, CARMEN (on nocturnal CPAP), Restrictive Lung Disease, squamous cell carcinoma OM Left 4th toe s/p amputation, and right 2nd toe amputation presented to the ED for right 5th toe ulcer.  Planned for right foot 5th metatarsal head resection & 5th metatarsal base exostectomy in 5/11.     HTN, HLD, Rt foot ulcer   - -150s   - Continue losartan, clonidine, hydralazine and metoprolol     - Continue Lipitor   - Resume aspirin per podiatry   - No evidence of any active ischemia     - TTE 07/2017 showed conc LVH, normal LV size and function  - No evidence of any meaningful volume overload     - No evidence of significant arrhythmia     - S/p Resection of head of fifth metatarsal bone, Resection of metatarsal bone 5/11/22  - Tolerated well without cardiac complications   - Continue antibiotics     - Monitor and replete lytes, keep K>4, Mg>2.  - Will continue to follow.    Jessica Wu, MS FNP, [facility]  Nurse Practitioner- Cardiology   Spectra # [id number] /[phone]

## 2022-05-12 NOTE — PROGRESS NOTE ADULT - SUBJECTIVE AND OBJECTIVE BOX
Patient is a 56y old  Male who presents with a chief complaint of R 5th toe infection (12 May 2022 11:01)    Patient seen in follow up for CKD, h/o Kidney transplant.        PAST MEDICAL HISTORY:  Hypertension    Hyperlipidemia    Diabetes mellitus    CKD (chronic kidney disease)    Diabetic peripheral neuropathy    Obesity (BMI 30-39.9)    CVA (cerebral vascular accident)    Squamous cell carcinoma of skin of left ear    History of DVT (deep vein thrombosis)    Recurrent squamous cell carcinoma of skin      MEDICATIONS  (STANDING):  atorvastatin 40 milliGRAM(s) Oral at bedtime  azaTHIOprine 100 milliGRAM(s) Oral daily  cloNIDine 0.1 milliGRAM(s) Oral two times a day  dextrose 5%. 1000 milliLiter(s) (50 mL/Hr) IV Continuous <Continuous>  dextrose 50% Injectable 25 Gram(s) IV Push once  gabapentin 300 milliGRAM(s) Oral two times a day  glucagon  Injectable 1 milliGRAM(s) IntraMuscular once  hydrALAZINE 25 milliGRAM(s) Oral three times a day  insulin lispro (ADMELOG) Pump 1 Each SubCutaneous Continuous Pump  losartan 25 milliGRAM(s) Oral daily  metoprolol tartrate 75 milliGRAM(s) Oral every 12 hours  piperacillin/tazobactam IVPB.. 3.375 Gram(s) IV Intermittent every 8 hours  tacrolimus 1 milliGRAM(s) Oral <User Schedule>  tacrolimus 0.5 milliGRAM(s) Oral <User Schedule>  trimethoprim   80 mG/sulfamethoxazole 400 mG 1 Tablet(s) Oral daily    MEDICATIONS  (PRN):  acetaminophen     Tablet .. 650 milliGRAM(s) Oral every 6 hours PRN Temp greater or equal to 38C (100.4F), Mild Pain (1 - 3)  aluminum hydroxide/magnesium hydroxide/simethicone Suspension 30 milliLiter(s) Oral every 4 hours PRN Dyspepsia  dextrose Oral Gel 15 Gram(s) Oral once PRN Blood Glucose LESS THAN 70 milliGRAM(s)/deciliter  melatonin 3 milliGRAM(s) Oral at bedtime PRN Insomnia    T(C): 36.8 (22 @ 04:42), Max: 37.3 (22 @ 15:48)  HR: 73 (22 @ 04:42) (64 - 85)  BP: 137/60 (22 @ 04:42) (102/37 - 169/90)  RR: 18 (22 @ 04:42)  SpO2: 93% (22 @ 04:42)  Wt(kg): --  I&O's Detail    11 May 2022 07:01  -  12 May 2022 07:00  --------------------------------------------------------  IN:  Total IN: 0 mL    OUT:    Voided (mL): 1100 mL  Total OUT: 1100 mL    Total NET: -1100 mL                PHYSICAL EXAM:  General: No distress  Respiratory: b/l air entry  Cardiovascular: S1 S2  Gastrointestinal: soft  Extremities:  edema                           LABORATORY:                        12.5   7.61  )-----------( 237      ( 12 May 2022 10:03 )             39.7         140  |  104  |  17  ----------------------------<  160<H>  4.1   |  31  |  1.10    Ca    9.2      12 May 2022 10:03  Mg     2.0         TPro  8.0  /  Alb  3.5  /  TBili  0.5  /  DBili  x   /  AST  13<L>  /  ALT  18  /  AlkPhos  107  05-10    Sodium, Serum: 140 mmol/L ( @ 10:03)  Sodium, Serum: 140 mmol/L ( 08:33)  Sodium, Serum: 138 mmol/L (05-10 @ 14:58)    Potassium, Serum: 4.1 mmol/L ( 10:03)  Potassium, Serum: 4.0 mmol/L ( @ 08:33)  Potassium, Serum: 4.3 mmol/L (05-10 @ 14:58)    Hemoglobin: 12.5 g/dL ( @ 10:03)  Hemoglobin: 13.1 g/dL ( @ 08:33)  Hemoglobin: 14.3 g/dL (05-10 @ 14:58)    Creatinine, Serum 1.10 ( @ 10:03)  Creatinine, Serum 1.20 ( 08:33)  Creatinine, Serum 1.10 (05-10 @ 14:58)        LIVER FUNCTIONS - ( 10 May 2022 14:58 )  Alb: 3.5 g/dL / Pro: 8.0 g/dL / ALK PHOS: 107 U/L / ALT: 18 U/L / AST: 13 U/L / GGT: x           Urinalysis Basic - ( 10 May 2022 18:42 )    Color: Yellow / Appearance: Clear / S.015 / pH: x  Gluc: x / Ketone: Trace  / Bili: Negative / Urobili: Negative   Blood: x / Protein: 30 mg/dL / Nitrite: Negative   Leuk Esterase: Negative / RBC: 0-2 /HPF / WBC Negative   Sq Epi: x / Non Sq Epi: Occasional / Bacteria: Occasional

## 2022-05-12 NOTE — PHYSICAL THERAPY INITIAL EVALUATION ADULT - PERTINENT HX OF CURRENT PROBLEM, REHAB EVAL
55 y/o M with HTN, HLD, T1DM (on Insulin pump), CKD s/p renal transplant, CVA with residual, DVT s/p Eliquis, CARMEN (on nocturnal CPAP), Restrictive Lung Disease, squamous cell carcinoma OM Left 4th toe s/p amputation, and right 2nd toe amputation presented to the ED for right 5th toe ulcer.  Planned for right foot 5th metatarsal head resection & 5th metatarsal base exostectomy in 5/11.

## 2022-05-12 NOTE — PROGRESS NOTE ADULT - SUBJECTIVE AND OBJECTIVE BOX
The patient was s/p right foot 5th metatarsal head and base resection with GA  56y Male    T(C): 36.8 (05-12-22 @ 04:42), Max: 37.3 (05-11-22 @ 15:48)  HR: 73 (05-12-22 @ 04:42) (66 - 78)  BP: 137/60 (05-12-22 @ 04:42) (102/37 - 151/76)  RR: 18 (05-12-22 @ 04:42) (12 - 19)  SpO2: 93% (05-12-22 @ 04:42) (92% - 100%)  Wt(kg): --    Pt doing well, no anesthesia complications or complaints noted or reported.       No additional recommendations.

## 2022-05-12 NOTE — PROGRESS NOTE ADULT - SUBJECTIVE AND OBJECTIVE BOX
[facility] -- Julisa Jansen, Luly Larson, Karan Weldon, Jay Becker: Office # [phone]    Follow Up: Cardiac clearance, HTN, HLD     Subjective/Observations: Patient awake, alert, resting in bed. S/p OR yesterday. Denies chest pain, palpitations and dizziness. Denies any difficulty breathing. No orthopnea and PND. Tolerating O2 via nasal cannula. Right foot DSD intact.     REVIEW OF SYSTEMS: All other review of systems are negative unless indicated above    PAST MEDICAL & SURGICAL HISTORY:  Hypertension      Hyperlipidemia      Diabetes mellitus  Type 1 on insulin pump      CKD (chronic kidney disease)  Renal Transplant 2013 at [facility]      Diabetic peripheral neuropathy      Obesity (BMI 30-39.9)      CVA (cerebral vascular accident)  [place]enberg Stroke  low L body sensation  low R face sensation  decreased peripheral vision  poor balance      Squamous cell carcinoma of skin of left ear      History of DVT (deep vein thrombosis)  s/p eliquis      Recurrent squamous cell carcinoma of skin  nose, L arm      Toe infection  2007 L 4th Toe surgery-amputation secondary to osteomyelitis      Ear disease  Left inner ear surgery, 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 1000 milliLiter(s) (50 mL/Hr) IV Continuous <Continuous>  dextrose 50% Injectable 25 Gram(s) IV Push once  gabapentin 300 milliGRAM(s) Oral two times a day  glucagon  Injectable 1 milliGRAM(s) IntraMuscular once  hydrALAZINE 25 milliGRAM(s) Oral three times a day  insulin lispro (ADMELOG) Pump 1 Each SubCutaneous Continuous Pump  losartan 25 milliGRAM(s) Oral daily  metoprolol tartrate 75 milliGRAM(s) Oral every 12 hours  tacrolimus 1 milliGRAM(s) Oral <User Schedule>  tacrolimus 0.5 milliGRAM(s) Oral <User Schedule>  trimethoprim   80 mG/sulfamethoxazole 400 mG 1 Tablet(s) Oral daily    MEDICATIONS  (PRN):  acetaminophen     Tablet .. 650 milliGRAM(s) Oral every 6 hours PRN Temp greater or equal to 38C (100.4F), Mild Pain (1 - 3)  aluminum hydroxide/magnesium hydroxide/simethicone Suspension 30 milliLiter(s) Oral every 4 hours PRN Dyspepsia  dextrose Oral Gel 15 Gram(s) Oral once PRN Blood Glucose LESS THAN 70 milliGRAM(s)/deciliter  melatonin 3 milliGRAM(s) Oral at bedtime PRN Insomnia    Allergies    No Known Allergies    Intolerances    Vital Signs Last 24 Hrs  T(C): 36.8 (12 May 2022 04:42), Max: 37.3 (11 May 2022 15:48)  T(F): 98.3 (12 May 2022 04:42), Max: 99.1 (11 May 2022 15:48)  HR: 73 (12 May 2022 04:42) (66 - 78)  BP: 137/60 (12 May 2022 04:42) (102/37 - 151/76)  BP(mean): --  RR: 18 (12 May 2022 04:42) (12 - 19)  SpO2: 93% (12 May 2022 04:42) (92% - 100%)  I&O's Summary    11 May 2022 07:01  -  12 May 2022 07:00  --------------------------------------------------------  IN: 0 mL / OUT: 1100 mL / NET: -1100 mL          TELE:  SR 60-70s   PHYSICAL EXAM:  Constitutional: NAD, awake and alert, Obese   HEENT: Moist Mucous Membranes, Anicteric  Pulmonary: Non-labored, breath sounds are clear bilaterally, No wheezing, rales or rhonchi  Cardiovascular: Regular, S1 and S2, No murmurs, No rubs, gallops or clicks  Gastrointestinal:  soft, nontender, nondistended   Lymph: No peripheral edema. No lymphadenopathy.   Skin: No visible rashes Right foot DSD intact.   Psych:  Mood & affect appropriate      LABS: All Labs Reviewed:                        12.5   7.61  )-----------( 237      ( 12 May 2022 10:03 )             39.7                         13.1   7.53  )-----------( 229      ( 11 May 2022 08:33 )             39.7                         14.3   7.98  )-----------( 245      ( 10 May 2022 14:58 )             43.1     11 May 2022 08:33    140    |  105    |  18     ----------------------------<  126    4.0     |  31     |  1.20   10 May 2022 14:58    138    |  105    |  20     ----------------------------<  195    4.3     |  29     |  1.10     Ca    9.2        11 May 2022 08:33  Ca    9.1        10 May 2022 14:58  Mg     2.0       11 May 2022 08:33    TPro  8.0    /  Alb  3.5    /  TBili  0.5    /  DBili  x      /  AST  13     /  ALT  18     /  AlkPhos  107    10 May 2022 14:58   LIVER FUNCTIONS - ( 10 May 2022 14:58 )  Alb: 3.5 g/dL / Pro: 8.0 g/dL / ALK PHOS: 107 U/L / ALT: 18 U/L / AST: 13 U/L / GGT: x           PT/INR - ( 10 May 2022 14:58 )   PT: 12.0 sec;   INR: 1.03 ratio         PTT - ( 10 May 2022 14:58 )  PTT:29.0 secLactate, Blood: 0.8 mmol/L (05-10-22 @ 15:09)    12 Lead ECG:   Ventricular Rate 63 BPM    Atrial Rate 63 BPM    P-R Interval 256 ms    QRS Duration 96 ms    Q-T Interval 404 ms    QTC Calculation(Bazett) 413 ms    P Axis 23 degrees    R Axis -4 degrees    T Axis 20 degrees    Diagnosis Line Sinus rhythm with 1st degree AV block  Otherwise normal ECG  Confirmed by ANDREW WELDON ([id number]) on 5/11/2022 11:14:58 AM (05-11-22 @ 08:49)      Patient name: LUTHER NORIEGA  YOB: 1965   Age: 51 (M)   MR#: [id number]  Study Date: 7/27/2017  Location: O/PSonographer: Susie Zuleta RDCS  Study quality: Technically difficult  Referring Physician: Kashmir Ochoa MD  Blood Pressure: 150/77 mmHg  Height: 185 cm  Weight: 136 kg  BSA: 2.6 m2  ------------------------------------------------------------------------  PROCEDURE: Transthoracic echocardiogram with 2-D, M-Mode  and complete spectral and color flow Doppler. Intravenous  catheter inserted. Verbal consent was obtained for  injection of echo contrast following a discussion of risks  and benefits. Following intravenous injection of contrast,  harmonic imaging was performed.  INDICATION: Primary pulmonary hypertension(I27.0)  ------------------------------------------------------------------------  Dimensions:    Normal Values:  LA:     4.0    2.0 - 4.0 cm  Ao:     3.5    2.0 - 3.8 cm  SEPTUM: 1.1    0.6 - 1.2 cm  PWT:    1.2    0.6 - 1.1 cm  LVIDd:  4.9    3.0 -5.6 cm  LVIDs:  3.3    1.8 - 4.0 cm  Derived variables:  LVMI: 83 g/m2  RWT: 0.48  Fractional short: 33 %  EF (Teicholtz): 61 %  ------------------------------------------------------------------------  Observations:  Mitral Valve: Normal mitral valve. Minimal mitral  regurgitation.  Aortic Valve/Aorta: Normal trileaflet aortic valve.  Aortic Root: 3.5 cm.  Left Atrium: Normal left atrium.  LA volume index = 20  cc/m2.  Left Ventricle: Normal left ventricular systolic function.  No segmental wallmotion abnormalities. Endocardial  visualization enhanced with intravenous injection of echo  contrast (Definity). Increased relative wall thickness with  normal left ventricular mass index, consistent with  concentric left ventricular remodeling.  Right Heart: Normal right atrium. Right ventricular  enlargement with normal right ventricular systolic  function. Normal tricuspid valve. Minimal tricuspid  regurgitation. Normal pulmonic valve.  Pericardium/Pleura: Normal pericardium with no pericardial  effusion.  Hemodynamic: Estimated right atrial pressure is 8 mm Hg.  Estimated right ventricular systolic pressure equals 29 mm  Hg, assuming right atrial pressure equals 8 mm Hg,  consistent with normal pulmonary pressures.  ------------------------------------------------------------------------  Conclusions:  1. Increased relative wall thickness with normal left  ventricular mass index, consistent with concentric left  ventricular remodeling.  2. Normal left ventricular systolic function. No segmental  wall motion abnormalities. Endocardial visualization  enhanced with intravenous injection of echo contrast  (Definity).  ------------------------------------------------------------------------  Confirmed on  7/27/2017 - 13:49:24 by RASHEED Del Cid  ------------------------------------------------------------------------

## 2022-05-12 NOTE — PROGRESS NOTE ADULT - SUBJECTIVE AND OBJECTIVE BOX
Patient is a 56y old  Male who presents with a chief complaint of R 5th toe infection (12 May 2022 10:27)      INTERVAL HPI/OVERNIGHT EVENTS: Patient seen and examined. NAD. No complaints.     Vital Signs Last 24 Hrs  T(C): 36.8 (12 May 2022 04:42), Max: 37.3 (11 May 2022 15:48)  T(F): 98.3 (12 May 2022 04:42), Max: 99.1 (11 May 2022 15:48)  HR: 73 (12 May 2022 04:42) (66 - 78)  BP: 137/60 (12 May 2022 04:42) (102/37 - 151/76)  BP(mean): --  RR: 18 (12 May 2022 04:42) (12 - 19)  SpO2: 93% (12 May 2022 04:42) (92% - 100%)    05    140  |  104  |  17  ----------------------------<  160<H>  4.1   |  31  |  1.10    Ca    9.2      12 May 2022 10:03  Mg     2.0         TPro  8.0  /  Alb  3.5  /  TBili  0.5  /  DBili  x   /  AST  13<L>  /  ALT  18  /  AlkPhos  107  0510                          12.5   7.61  )-----------( 237      ( 12 May 2022 10:03 )             39.7     PT/INR - ( 10 May 2022 14:58 )   PT: 12.0 sec;   INR: 1.03 ratio         PTT - ( 10 May 2022 14:58 )  PTT:29.0 sec  CAPILLARY BLOOD GLUCOSE      POCT Blood Glucose.: 170 mg/dL (12 May 2022 07:48)  POCT Blood Glucose.: 129 mg/dL (11 May 2022 20:51)  POCT Blood Glucose.: 141 mg/dL (11 May 2022 18:24)  POCT Blood Glucose.: 152 mg/dL (11 May 2022 16:22)  POCT Blood Glucose.: 131 mg/dL (11 May 2022 11:40)    Urinalysis Basic - ( 10 May 2022 18:42 )    Color: Yellow / Appearance: Clear / S.015 / pH: x  Gluc: x / Ketone: Trace  / Bili: Negative / Urobili: Negative   Blood: x / Protein: 30 mg/dL / Nitrite: Negative   Leuk Esterase: Negative / RBC: 0-2 /HPF / WBC Negative   Sq Epi: x / Non Sq Epi: Occasional / Bacteria: Occasional    Culture - Tissue with Gram Stain (22 @ 23:50)   Gram Stain:   No polymorphonuclear cells seen per low power field   No organisms seen per oil power field   Specimen Source: .Tissue Other, right 5th metatarsal base bone       Historical Values  Culture - Tissue with Gram Stain (22 @ 23:50)   Gram Stain:   No polymorphonuclear cells seen per low power field   No organisms seen per oil power field   Specimen Source: .Tissue Other, right 5th metatarsal base bone   Culture - Tissue with Gram Stain (22 @ 23:50)   Gram Stain:   No polymorphonuclear cells seen per low power field   No organisms seen per oil power field   Specimen Source: .Tissue Other, right 5th metatarsal head bone Culture - Tissue with Gram Stain (22 @ 23:50)   Gram Stain:   No polymorphonuclear cells seen per low power field   No organisms seen per oil power field   Specimen Source: .Tissue Other, right 5th metatarsal head bone       Historical Values  Culture - Tissue with Gram Stain (22 @ 23:50)   Gram Stain:   No polymorphonuclear cells seen per low power field   No organisms seen per oil power field   Specimen Source: .Tissue Other, right 5th metatarsal base bone   Culture - Tissue with Gram Stain (22 @ 23:50)   Gram Stain:   No polymorphonuclear cells seen per low power field   No organisms seen per oil power field   Specimen Source: .Tissue Other, right 5th metatarsal head bone Culture - Blood (05.10.22 @ 18:50)   Specimen Source: .Blood Blood-Peripheral   Culture Results:   No growth to date.       Historical Values  Culture - Blood (05.10.22 @ 18:50)   Specimen Source: .Blood Blood-Peripheral   Culture Results:   No growth to date.   Culture - Blood (05.10.22 @ 18:50)   Specimen Source: .Blood Blood-Peripheral   Culture Results:   No growth to date.           acetaminophen     Tablet .. 650 milliGRAM(s) Oral every 6 hours PRN  aluminum hydroxide/magnesium hydroxide/simethicone Suspension 30 milliLiter(s) Oral every 4 hours PRN  atorvastatin 40 milliGRAM(s) Oral at bedtime  azaTHIOprine 100 milliGRAM(s) Oral daily  cloNIDine 0.1 milliGRAM(s) Oral two times a day  dextrose 5%. 1000 milliLiter(s) IV Continuous <Continuous>  dextrose 50% Injectable 25 Gram(s) IV Push once  dextrose Oral Gel 15 Gram(s) Oral once PRN  gabapentin 300 milliGRAM(s) Oral two times a day  glucagon  Injectable 1 milliGRAM(s) IntraMuscular once  hydrALAZINE 25 milliGRAM(s) Oral three times a day  insulin lispro (ADMELOG) Pump 1 Each SubCutaneous Continuous Pump  losartan 25 milliGRAM(s) Oral daily  melatonin 3 milliGRAM(s) Oral at bedtime PRN  metoprolol tartrate 75 milliGRAM(s) Oral every 12 hours  piperacillin/tazobactam IVPB.. 3.375 Gram(s) IV Intermittent every 8 hours  tacrolimus 1 milliGRAM(s) Oral <User Schedule>  tacrolimus 0.5 milliGRAM(s) Oral <User Schedule>  trimethoprim   80 mG/sulfamethoxazole 400 mG 1 Tablet(s) Oral daily          REVIEW OF SYSTEMS:  CONSTITUTIONAL: No fever, no weight loss, or no fatigue  NECK: No pain, no stiffness  RESPIRATORY: No cough, no wheezing, no chills, no hemoptysis, No shortness of breath  CARDIOVASCULAR: No chest pain, no palpitations, no dizziness, no leg swelling  GASTROINTESTINAL: No abdominal pain. No nausea, no vomiting, no hematemesis; No diarrhea, no constipation. No melena, no hematochezia.  GENITOURINARY: No dysuria, no frequency, no hematuria, no incontinence  NEUROLOGICAL: No headaches, no loss of strength, no numbness, no tremors  SKIN: No itching, no burning  MUSCULOSKELETAL: No joint pain, no swelling; No muscle, no back, no extremity pain  PSYCHIATRIC: No depression, no mood swings,   HEME/LYMPH: No easy bruising, no bleeding gums  ALLERY AND IMMUNOLOGIC: No hives       Consultant(s) Notes Reviewed:  [X] YES  [ ] NO    PHYSICAL EXAM:  GENERAL: NAD  HEAD:  Atraumatic, Normocephalic  EYES: EOMI, PERRLA, conjunctiva and sclera clear  ENMT: No tonsillar erythema, exudates, or enlargement; Moist mucous membranes  NECK: Supple, No JVD  NERVOUS SYSTEM:  Awake & alert  CHEST/LUNG: Clear to auscultation bilaterally; No rales, rhonchi, wheezing,  HEART: Regular rate and rhythm  ABDOMEN: Soft, Nontender, Nondistended; Bowel sounds present  EXTREMITIES:  No clubbing, cyanosis, or edema; right foot bandaged  LYMPH: No lymphadenopathy noted  SKIN: No rashes      Advanced care planning discussed with patient/family [X] YES   [ ] NO    Advanced care planning discussed with patient/family. Patient's health status was discussed. All appropriate changes have been made regarding patient's end-of-life care. Advanced care planning forms reviewed/discussed/completed.  20 minutes spent.

## 2022-05-12 NOTE — PROGRESS NOTE ADULT - ASSESSMENT
CKD 3, h/o Kidney transplant 2013 [facility]  Diabetes, Diabetic foot ulcer  Hypertension    Renal indices at baseline. To continue current immuno suppressant regimen. Pending tacrolimus level.   Podiatry follow up. Monitor blood sugar levels. Insulin coverage as needed. Dietary restriction.   Monitor BP trend. Titrate BP meds as needed. Salt restriction. Will follow electrolytes and renal function trend.

## 2022-05-12 NOTE — PHYSICAL THERAPY INITIAL EVALUATION ADULT - ADDITIONAL COMMENTS
Pt lives in a private home with 4 CHESTER. Bedroom is on upper level that requires 7 steps to climb. Lives with wife and 3 daughters. IND with all ADLs at baseline

## 2022-05-12 NOTE — PROGRESS NOTE ADULT - SUBJECTIVE AND OBJECTIVE BOX
56y year old Male seen at [facility] 2EAS 214 D1 for Pt s/p RIGHT foot 5th metatarsal head and base resection, POD1 (DOS: 5/11/2022). Pt AAOx3 in NAD. Pt resting comfortably. Pt denies any pain to RLE at this time. Pt tolerating diet, voiding without any difficulties. Pt complaining of fever. Denies any chills, nausea, vomiting, chest pain, shortness of breath, or calf pain at this time.       Allergies    No Known Allergies    Intolerances        MEDICATIONS  (STANDING):  atorvastatin 40 milliGRAM(s) Oral at bedtime  azaTHIOprine 100 milliGRAM(s) Oral daily  cloNIDine 0.1 milliGRAM(s) Oral two times a day  dextrose 5%. 1000 milliLiter(s) (50 mL/Hr) IV Continuous <Continuous>  dextrose 50% Injectable 25 Gram(s) IV Push once  gabapentin 300 milliGRAM(s) Oral two times a day  glucagon  Injectable 1 milliGRAM(s) IntraMuscular once  hydrALAZINE 25 milliGRAM(s) Oral three times a day  insulin lispro (ADMELOG) Pump 1 Each SubCutaneous Continuous Pump  losartan 25 milliGRAM(s) Oral daily  metoprolol tartrate 75 milliGRAM(s) Oral every 12 hours  piperacillin/tazobactam IVPB.. 3.375 Gram(s) IV Intermittent every 8 hours  tacrolimus 1 milliGRAM(s) Oral <User Schedule>  tacrolimus 0.5 milliGRAM(s) Oral <User Schedule>  trimethoprim   80 mG/sulfamethoxazole 400 mG 1 Tablet(s) Oral daily    MEDICATIONS  (PRN):  acetaminophen     Tablet .. 650 milliGRAM(s) Oral every 6 hours PRN Temp greater or equal to 38C (100.4F), Mild Pain (1 - 3)  aluminum hydroxide/magnesium hydroxide/simethicone Suspension 30 milliLiter(s) Oral every 4 hours PRN Dyspepsia  dextrose Oral Gel 15 Gram(s) Oral once PRN Blood Glucose LESS THAN 70 milliGRAM(s)/deciliter  melatonin 3 milliGRAM(s) Oral at bedtime PRN Insomnia      Vital Signs Last 24 Hrs  T(C): 38.1 (12 May 2022 14:01), Max: 38.1 (12 May 2022 14:01)  T(F): 100.5 (12 May 2022 14:01), Max: 100.5 (12 May 2022 14:01)  HR: 77 (12 May 2022 14:01) (70 - 78)  BP: 141/75 (12 May 2022 14:01) (102/37 - 148/73)  BP(mean): --  RR: 19 (12 May 2022 14:01) (12 - 19)  SpO2: 94% (12 May 2022 14:01) (93% - 100%)    PHYSICAL EXAM:  Vascular:  DP palpable bilaterally; PT palpable bilaterally, Capillary refill (CFT) 3 seconds. Digital hair absent bilaterally.   Neurological: Light touch sensation diminished bilaterally.  Musculoskeletal:  Left 4th toe s/p amputation and distal phalanx amputation of the 2 & 3rd. Right 2nd toe amputation. 4/5 strength in all quadrants bilaterally, AJ & STJ ROM absent b/l.   Dermatological: Surgical incisions noted to the lateral aspect of right foot. Sutures clean, dry, and intact. No signs of wound dehiscence. Right lateral 5th metatarsal head 1 x 1 x 0.2 cm and 5th metatarsal base 4 x 4 x 0.3 cm ulcers. All wound beds granular, with no periwound erythema. No purulence, no undermining, probes to bone.    CBC Full  -  ( 12 May 2022 10:03 )  WBC Count : 7.61 K/uL  RBC Count : 4.55 M/uL  Hemoglobin : 12.5 g/dL  Hematocrit : 39.7 %  Platelet Count - Automated : 237 K/uL  Mean Cell Volume : 87.3 fl  Mean Cell Hemoglobin : 27.5 pg  Mean Cell Hemoglobin Concentration : 31.5 gm/dL  Auto Neutrophil # : x  Auto Lymphocyte # : x  Auto Monocyte # : x  Auto Eosinophil # : x  Auto Basophil # : x  Auto Neutrophil % : x  Auto Lymphocyte % : x  Auto Monocyte % : x  Auto Eosinophil % : x  Auto Basophil % : x      ----------CHEM PANEL----------          Culture - Tissue with Gram Stain (collected 11 May 2022 23:50)  Source: .Tissue Other, right 5th metatarsal base bone  Gram Stain (12 May 2022 03:40):    No polymorphonuclear cells seen per low power field    No organisms seen per oil power field    Culture - Tissue with Gram Stain (collected 11 May 2022 23:50)  Source: .Tissue Other, right 5th metatarsal head bone  Gram Stain (12 May 2022 03:40):    No polymorphonuclear cells seen per low power field    No organisms seen per oil power field    Culture - Urine (collected 11 May 2022 00:44)  Source: Clean Catch Clean Catch (Midstream)  Final Report (11 May 2022 20:41):    No growth    Culture - Blood (collected 10 May 2022 18:50)  Source: .Blood Blood-Peripheral  Preliminary Report (11 May 2022 19:02):    No growth to date.    Culture - Blood (collected 10 May 2022 18:50)  Source: .Blood Blood-Peripheral  Preliminary Report (11 May 2022 19:02):    No growth to date.      ACC: [id number] EXAM: XR FOOT COMP MIN 3 VIEWS RT    PROCEDURE DATE: 05/11/2022        INTERPRETATION: RIGHT foot    CLINICAL INFORMATION: Postoperative RIGHT foot radiographs TECHNIQUE: AP,lateral and oblique views.    FINDINGS:  Status post resection of the head of the fifth metatarsal bone. RIGHT fifth phalanges in situ. Remaining osseous and joint structures intact.    IMPRESSION:    Postop changes as described    --- End of Report ---

## 2022-05-12 NOTE — PROGRESS NOTE ADULT - SUBJECTIVE AND OBJECTIVE BOX
[facility] DIVISION of INFECTIOUS DISEASE  Marko Saucedo MD PhD, Qiana Steven MD, Claire Castro MD, Tal Russo MD, David Bravo MD  and providing coverage with Krista Del Valle MD  Providing Infectious Disease Consultations at [facility], [facility], [facility]'s    Office# [phone] to schedule follow up appointments  Answering Service for urgent calls or New Consults [phone]  Cell# to text for urgent issues Marko Saucedo [phone]     infectious diseases progress note:    LUTHER NORIEGA is a 56y y. o. Male patient    No concerning overnight events    Allergies    No Known Allergies    Intolerances        ANTIBIOTICS/RELEVANT:  antimicrobials  piperacillin/tazobactam IVPB.. 3.375 Gram(s) IV Intermittent every 8 hours  trimethoprim   80 mG/sulfamethoxazole 400 mG 1 Tablet(s) Oral daily    immunologic:  azaTHIOprine 100 milliGRAM(s) Oral daily  tacrolimus 1 milliGRAM(s) Oral <User Schedule>  tacrolimus 0.5 milliGRAM(s) Oral <User Schedule>    OTHER:  acetaminophen     Tablet .. 650 milliGRAM(s) Oral every 6 hours PRN  aluminum hydroxide/magnesium hydroxide/simethicone Suspension 30 milliLiter(s) Oral every 4 hours PRN  atorvastatin 40 milliGRAM(s) Oral at bedtime  cloNIDine 0.1 milliGRAM(s) Oral two times a day  dextrose 5%. 1000 milliLiter(s) IV Continuous <Continuous>  dextrose 50% Injectable 25 Gram(s) IV Push once  dextrose Oral Gel 15 Gram(s) Oral once PRN  gabapentin 300 milliGRAM(s) Oral two times a day  glucagon  Injectable 1 milliGRAM(s) IntraMuscular once  hydrALAZINE 25 milliGRAM(s) Oral three times a day  insulin lispro (ADMELOG) Pump 1 Each SubCutaneous Continuous Pump  losartan 25 milliGRAM(s) Oral daily  melatonin 3 milliGRAM(s) Oral at bedtime PRN  metoprolol tartrate 75 milliGRAM(s) Oral every 12 hours      Objective:  Vital Signs Last 24 Hrs  T(C): 36.8 (12 May 2022 04:42), Max: 37.3 (11 May 2022 15:48)  T(F): 98.3 (12 May 2022 04:42), Max: 99.1 (11 May 2022 15:48)  HR: 73 (12 May 2022 04:42) (70 - 78)  BP: 137/60 (12 May 2022 04:42) (102/37 - 151/76)  BP(mean): --  RR: 18 (12 May 2022 04:42) (12 - 19)  SpO2: 93% (12 May 2022 04:42) (93% - 100%)    T(C): 36.8 (22 @ 04:42), Max: 37.3 (22 @ 15:48)  T(C): 36.8 (22 @ 04:42), Max: 37.3 (22 @ 15:48)  T(C): 36.8 (22 @ 04:42), Max: 37.3 (22 @ 15:48)    PHYSICAL EXAM:  HEENT: NC atraumatic  Neck: supple  Respiratory: no accessory muscle use, breathing comfortably  Cardiovascular: distant  Gastrointestinal: normal appearing, nondistended  Extremities: no clubbing, no cyanosis, foot wrapped        LABS:                          12.5   7.61  )-----------( 237      ( 12 May 2022 10:03 )             39.7       WBC  7.61  @ 10:03  7.53  @ 08:33  7.98 05-10 @ 14:58          140  |  104  |  17  ----------------------------<  160<H>  4.1   |  31  |  1.10    Ca    9.2      12 May 2022 10:03  Mg     2.0         TPro  8.0  /  Alb  3.5  /  TBili  0.5  /  DBili  x   /  AST  13<L>  /  ALT  18  /  AlkPhos  107  05-10      Creatinine, Serum: 1.10 mg/dL (22 @ 10:03)  Creatinine, Serum: 1.20 mg/dL (22 @ 08:33)  Creatinine, Serum: 1.10 mg/dL (05-10-22 @ 14:58)      PT/INR - ( 10 May 2022 14:58 )   PT: 12.0 sec;   INR: 1.03 ratio         PTT - ( 10 May 2022 14:58 )  PTT:29.0 sec  Urinalysis Basic - ( 10 May 2022 18:42 )    Color: Yellow / Appearance: Clear / S.015 / pH: x  Gluc: x / Ketone: Trace  / Bili: Negative / Urobili: Negative   Blood: x / Protein: 30 mg/dL / Nitrite: Negative   Leuk Esterase: Negative / RBC: 0-2 /HPF / WBC Negative   Sq Epi: x / Non Sq Epi: Occasional / Bacteria: Occasional            INFLAMMATORY MARKERS  Auto Lymphocyte #: 1.13 K/uL (05-10-22 @ 14:58)  Auto Neutrophil #: 5.92 K/uL (05-10-22 @ 14:58)    Lactate, Blood: 0.8 mmol/L (05-10-22 @ 15:09)    Auto Eosinophil #: 0.29 K/uL (05-10-22 @ 14:58)      Sedimentation Rate, Erythrocyte: 25 mm/hr (22 @ 08:33)  Sedimentation Rate, Erythrocyte: 25 mm/hr (05-10-22 @ 14:58)    Procalcitonin, Serum: <0.05 (22 @ 08:33)                  Activated Partial Thromboplastin Time: 29.0 sec (05-10-22 @ 14:58)  INR: 1.03 ratio (05-10-22 @ 14:58)          MICROBIOLOGY:              RADIOLOGY & ADDITIONAL STUDIES:

## 2022-05-13 PROCEDURE — 99232 SBSQ HOSP IP/OBS MODERATE 35: CPT

## 2022-05-13 RX ORDER — ENOXAPARIN SODIUM 100 MG/ML
40 INJECTION SUBCUTANEOUS EVERY 24 HOURS
Refills: 0 | Status: DISCONTINUED | OUTPATIENT
Start: 2022-05-13 | End: 2022-05-16

## 2022-05-13 RX ADMIN — Medication 75 MILLIGRAM(S): at 17:35

## 2022-05-13 RX ADMIN — Medication 75 MILLIGRAM(S): at 05:47

## 2022-05-13 RX ADMIN — Medication 0.1 MILLIGRAM(S): at 05:47

## 2022-05-13 RX ADMIN — Medication 25 MILLIGRAM(S): at 05:47

## 2022-05-13 RX ADMIN — PIPERACILLIN AND TAZOBACTAM 25 GRAM(S): 4; .5 INJECTION, POWDER, LYOPHILIZED, FOR SOLUTION INTRAVENOUS at 05:48

## 2022-05-13 RX ADMIN — GABAPENTIN 300 MILLIGRAM(S): 400 CAPSULE ORAL at 05:47

## 2022-05-13 RX ADMIN — LOSARTAN POTASSIUM 25 MILLIGRAM(S): 100 TABLET, FILM COATED ORAL at 05:47

## 2022-05-13 RX ADMIN — Medication 0.1 MILLIGRAM(S): at 17:33

## 2022-05-13 RX ADMIN — GABAPENTIN 300 MILLIGRAM(S): 400 CAPSULE ORAL at 17:32

## 2022-05-13 RX ADMIN — PIPERACILLIN AND TAZOBACTAM 25 GRAM(S): 4; .5 INJECTION, POWDER, LYOPHILIZED, FOR SOLUTION INTRAVENOUS at 14:32

## 2022-05-13 RX ADMIN — Medication 162 MILLIGRAM(S): at 11:52

## 2022-05-13 RX ADMIN — AZATHIOPRINE 100 MILLIGRAM(S): 100 TABLET ORAL at 11:53

## 2022-05-13 RX ADMIN — TACROLIMUS 1 MILLIGRAM(S): 5 CAPSULE ORAL at 09:42

## 2022-05-13 RX ADMIN — Medication 25 MILLIGRAM(S): at 21:21

## 2022-05-13 RX ADMIN — TACROLIMUS 0.5 MILLIGRAM(S): 5 CAPSULE ORAL at 21:22

## 2022-05-13 RX ADMIN — Medication 25 MILLIGRAM(S): at 14:32

## 2022-05-13 RX ADMIN — Medication 1 TABLET(S): at 11:53

## 2022-05-13 RX ADMIN — PIPERACILLIN AND TAZOBACTAM 25 GRAM(S): 4; .5 INJECTION, POWDER, LYOPHILIZED, FOR SOLUTION INTRAVENOUS at 21:24

## 2022-05-13 RX ADMIN — TACROLIMUS 0.5 MILLIGRAM(S): 5 CAPSULE ORAL at 09:42

## 2022-05-13 RX ADMIN — ENOXAPARIN SODIUM 40 MILLIGRAM(S): 100 INJECTION SUBCUTANEOUS at 11:52

## 2022-05-13 RX ADMIN — TACROLIMUS 1 MILLIGRAM(S): 5 CAPSULE ORAL at 21:22

## 2022-05-13 RX ADMIN — ATORVASTATIN CALCIUM 40 MILLIGRAM(S): 80 TABLET, FILM COATED ORAL at 21:25

## 2022-05-13 NOTE — PROGRESS NOTE ADULT - ASSESSMENT
This is a 55 y/o M with HTN, HLD, T1DM (on Insulin pump), CKD s/p renal transplant, CVA with residual, DVT s/p Eliquis, CARMEN (on nocturnal CPAP), Restrictive Lung Disease, squamous cell carcinoma OM Left 4th toe s/p amputation, and right 2nd toe amputation presented to the ED for right 5th toe ulcer.    Resection of head of fifth metatarsal bone 11-May-2022 18:24:11  Lalita Jamison  Resection of metatarsal bone 11-May-2022 18:24:41  Lalita Jamison.    R 5th toe DFU/OM    with prior enterococcus agree with C/w zosyn  Appreciate podiatry recs  sp OR 5/11  recs to follow based on path and micro but if clean margin will dc off abx    Hx of renal txp  -c/w bactrim for PJP PPx    We will follow along in the care of this patient. Please call us at [phone] or text me directly on my cell# at [phone] with any concerns.    Starting tomorrow Dr Claire Castro will be assuming care of this patient so please contact her with any questions, concerns or new micro data.

## 2022-05-13 NOTE — PROGRESS NOTE ADULT - SUBJECTIVE AND OBJECTIVE BOX
Patient is a 56y old  Male who presents with a chief complaint of R 5th toe infection (12 May 2022 10:27)      INTERVAL HPI/OVERNIGHT EVENTS: Patient seen and examined. NAD. No complaints.     Vital Signs Last 24 Hrs  T(C): 36.3 (13 May 2022 04:54), Max: 38.1 (12 May 2022 14:01)  T(F): 97.3 (13 May 2022 04:54), Max: 100.5 (12 May 2022 14:01)  HR: 63 (13 May 2022 04:54) (63 - 89)  BP: 126/79 (13 May 2022 04:54) (126/72 - 149/72)  BP(mean): --  RR: 18 (13 May 2022 04:54) (16 - 19)  SpO2: 98% (13 May 2022 04:54) (94% - 98%)    05-12    140  |  104  |  17  ----------------------------<  160<H>  4.1   |  31  |  1.10    Ca    9.2      12 May 2022 10:03  Mg     2.0     05-12                            12.5   7.61  )-----------( 237      ( 12 May 2022 10:03 )             39.7       CAPILLARY BLOOD GLUCOSE      POCT Blood Glucose.: 79 mg/dL (13 May 2022 07:59)  POCT Blood Glucose.: 143 mg/dL (12 May 2022 21:30)  POCT Blood Glucose.: 142 mg/dL (12 May 2022 16:54)  POCT Blood Glucose.: 190 mg/dL (12 May 2022 11:58)    Culture - Tissue with Gram Stain (05.11.22 @ 23:50)   Gram Stain:   No polymorphonuclear cells seen per low power field   No organisms seen per oil power field   Specimen Source: .Tissue Other, right 5th metatarsal base bone   Culture Results:   No growth       Historical Values  Culture - Tissue with Gram Stain (05.11.22 @ 23:50)   Gram Stain:   No polymorphonuclear cells seen per low power field   No organisms seen per oil power field   Specimen Source: .Tissue Other, right 5th metatarsal base bone   Culture Results:   No growth   Culture - Tissue with Gram Stain (05.11.22 @ 23:50)   Gram Stain:   No polymorphonuclear cells seen per low power field   No organisms seen per oil power field   Specimen Source: .Tissue Other, right 5th metatarsal head bone   Culture Results:   No growth           acetaminophen     Tablet .. 650 milliGRAM(s) Oral every 6 hours PRN  aluminum hydroxide/magnesium hydroxide/simethicone Suspension 30 milliLiter(s) Oral every 4 hours PRN  aspirin enteric coated 162 milliGRAM(s) Oral daily  atorvastatin 40 milliGRAM(s) Oral at bedtime  azaTHIOprine 100 milliGRAM(s) Oral daily  cloNIDine 0.1 milliGRAM(s) Oral two times a day  dextrose 5%. 1000 milliLiter(s) IV Continuous <Continuous>  dextrose 50% Injectable 25 Gram(s) IV Push once  dextrose Oral Gel 15 Gram(s) Oral once PRN  gabapentin 300 milliGRAM(s) Oral two times a day  glucagon  Injectable 1 milliGRAM(s) IntraMuscular once  hydrALAZINE 25 milliGRAM(s) Oral three times a day  insulin lispro (ADMELOG) Pump 1 Each SubCutaneous Continuous Pump  losartan 25 milliGRAM(s) Oral daily  melatonin 3 milliGRAM(s) Oral at bedtime PRN  metoprolol tartrate 75 milliGRAM(s) Oral every 12 hours  piperacillin/tazobactam IVPB.. 3.375 Gram(s) IV Intermittent every 8 hours  tacrolimus 1 milliGRAM(s) Oral <User Schedule>  tacrolimus 0.5 milliGRAM(s) Oral <User Schedule>  trimethoprim   80 mG/sulfamethoxazole 400 mG 1 Tablet(s) Oral daily        REVIEW OF SYSTEMS:  CONSTITUTIONAL: + fever, no weight loss, or no fatigue  NECK: No pain, no stiffness  RESPIRATORY: No cough, no wheezing, no chills, no hemoptysis, No shortness of breath  CARDIOVASCULAR: No chest pain, no palpitations, no dizziness, no leg swelling  GASTROINTESTINAL: No abdominal pain. No nausea, no vomiting, no hematemesis; No diarrhea, no constipation. No melena, no hematochezia.  GENITOURINARY: No dysuria, no frequency, no hematuria, no incontinence  NEUROLOGICAL: No headaches, no loss of strength, no numbness, no tremors  SKIN: No itching, no burning  MUSCULOSKELETAL: No joint pain, no swelling; No muscle, no back, no extremity pain  PSYCHIATRIC: No depression, no mood swings,   HEME/LYMPH: No easy bruising, no bleeding gums  ALLERY AND IMMUNOLOGIC: No hives       Consultant(s) Notes Reviewed:  [X] YES  [ ] NO    PHYSICAL EXAM:  GENERAL: NAD  HEAD:  Atraumatic, Normocephalic  EYES: EOMI, PERRLA, conjunctiva and sclera clear  ENMT: No tonsillar erythema, exudates, or enlargement; Moist mucous membranes  NECK: Supple, No JVD  NERVOUS SYSTEM:  Awake & alert  CHEST/LUNG: Clear to auscultation bilaterally; No rales, rhonchi, wheezing,  HEART: Regular rate and rhythm  ABDOMEN: Soft, Nontender, Nondistended; Bowel sounds present  EXTREMITIES:  No clubbing, cyanosis, or edema; right foot bandaged  LYMPH: No lymphadenopathy noted  SKIN: No rashes      Advanced care planning discussed with patient/family [X] YES   [ ] NO    Advanced care planning discussed with patient/family. Patient's health status was discussed. All appropriate changes have been made regarding patient's end-of-life care. Advanced care planning forms reviewed/discussed/completed.  20 minutes spent.

## 2022-05-13 NOTE — PROGRESS NOTE ADULT - SUBJECTIVE AND OBJECTIVE BOX
CAPILLARY BLOOD GLUCOSE      POCT Blood Glucose.: 143 mg/dL (12 May 2022 21:30)  POCT Blood Glucose.: 142 mg/dL (12 May 2022 16:54)  POCT Blood Glucose.: 190 mg/dL (12 May 2022 11:58)  POCT Blood Glucose.: 170 mg/dL (12 May 2022 07:48)      Vital Signs Last 24 Hrs  T(C): 36.3 (13 May 2022 04:54), Max: 38.1 (12 May 2022 14:01)  T(F): 97.3 (13 May 2022 04:54), Max: 100.5 (12 May 2022 14:01)  HR: 63 (13 May 2022 04:54) (63 - 89)  BP: 126/79 (13 May 2022 04:54) (126/72 - 149/72)  BP(mean): --  RR: 18 (13 May 2022 04:54) (16 - 19)  SpO2: 98% (13 May 2022 04:54) (94% - 98%)    General: WN/WD NAD  Respiratory: CTA B/L  CV: RRR, S1S2, no murmurs, rubs or gallops  Abdominal: Soft, NT, ND +BS, Last BM  Extremities: No edema, + peripheral pulses     05-12    140  |  104  |  17  ----------------------------<  160<H>  4.1   |  31  |  1.10    Ca    9.2      12 May 2022 10:03  Mg     2.0     05-12        atorvastatin 40 milliGRAM(s) Oral at bedtime  dextrose 50% Injectable 25 Gram(s) IV Push once  dextrose Oral Gel 15 Gram(s) Oral once PRN  glucagon  Injectable 1 milliGRAM(s) IntraMuscular once  insulin lispro (ADMELOG) Pump 1 Each SubCutaneous Continuous Pump

## 2022-05-13 NOTE — PROGRESS NOTE ADULT - ASSESSMENT
CKD 3, h/o Kidney transplant 2013 LRD  Diabetes, Diabetic foot ulcer  Hypertension    Renal indices at baseline. To continue current immuno suppressant regimen. Tacrolimus level within therapeutic range.    Podiatry follow up. Monitor blood sugar levels. Insulin coverage as needed. Dietary restriction.   Monitor BP trend. Titrate BP meds as needed. Salt restriction. Will follow electrolytes and renal function trend.

## 2022-05-13 NOTE — PROGRESS NOTE ADULT - SUBJECTIVE AND OBJECTIVE BOX
Patient is a 56y old  Male who presents with a chief complaint of R 5th toe infection (12 May 2022 11:01)    Patient seen in follow up for CKD, h/o Kidney transplant.        PAST MEDICAL HISTORY:  Hypertension    Hyperlipidemia    Diabetes mellitus    CKD (chronic kidney disease)    Diabetic peripheral neuropathy    Obesity (BMI 30-39.9)    CVA (cerebral vascular accident)    Squamous cell carcinoma of skin of left ear    History of DVT (deep vein thrombosis)    Recurrent squamous cell carcinoma of skin      MEDICATIONS  (STANDING):  aspirin enteric coated 162 milliGRAM(s) Oral daily  atorvastatin 40 milliGRAM(s) Oral at bedtime  azaTHIOprine 100 milliGRAM(s) Oral daily  cloNIDine 0.1 milliGRAM(s) Oral two times a day  dextrose 5%. 1000 milliLiter(s) (50 mL/Hr) IV Continuous <Continuous>  dextrose 50% Injectable 25 Gram(s) IV Push once  enoxaparin Injectable 40 milliGRAM(s) SubCutaneous every 24 hours  gabapentin 300 milliGRAM(s) Oral two times a day  glucagon  Injectable 1 milliGRAM(s) IntraMuscular once  hydrALAZINE 25 milliGRAM(s) Oral three times a day  insulin lispro (ADMELOG) Pump 1 Each SubCutaneous Continuous Pump  losartan 25 milliGRAM(s) Oral daily  metoprolol tartrate 75 milliGRAM(s) Oral every 12 hours  piperacillin/tazobactam IVPB.. 3.375 Gram(s) IV Intermittent every 8 hours  tacrolimus 1 milliGRAM(s) Oral <User Schedule>  tacrolimus 0.5 milliGRAM(s) Oral <User Schedule>  trimethoprim   80 mG/sulfamethoxazole 400 mG 1 Tablet(s) Oral daily    MEDICATIONS  (PRN):  acetaminophen     Tablet .. 650 milliGRAM(s) Oral every 6 hours PRN Temp greater or equal to 38C (100.4F), Mild Pain (1 - 3)  aluminum hydroxide/magnesium hydroxide/simethicone Suspension 30 milliLiter(s) Oral every 4 hours PRN Dyspepsia  dextrose Oral Gel 15 Gram(s) Oral once PRN Blood Glucose LESS THAN 70 milliGRAM(s)/deciliter  melatonin 3 milliGRAM(s) Oral at bedtime PRN Insomnia    T(C): 36.9 (05-13-22 @ 12:37), Max: 38.1 (05-12-22 @ 14:01)  HR: 69 (05-13-22 @ 12:37) (63 - 89)  BP: 146/75 (05-13-22 @ 12:37) (102/37 - 151/76)  RR: 18 (05-13-22 @ 12:37)  SpO2: 91% (05-13-22 @ 12:37)  Wt(kg): --  I&O's Detail    12 May 2022 07:01  -  13 May 2022 07:00  --------------------------------------------------------  IN:  Total IN: 0 mL    OUT:    Stool (mL): 0 mL    Voided (mL): 500 mL  Total OUT: 500 mL    Total NET: -500 mL                      PHYSICAL EXAM:  General: No distress  Respiratory: b/l air entry  Cardiovascular: S1 S2  Gastrointestinal: soft  Extremities:  edema                       LABORATORY:                        12.5   7.61  )-----------( 237      ( 12 May 2022 10:03 )             39.7     05-12    140  |  104  |  17  ----------------------------<  160<H>  4.1   |  31  |  1.10    Ca    9.2      12 May 2022 10:03  Mg     2.0     05-12      Sodium, Serum: 140 mmol/L (05-12 @ 10:03)    Potassium, Serum: 4.1 mmol/L (05-12 @ 10:03)    Hemoglobin: 12.5 g/dL (05-12 @ 10:03)  Hemoglobin: 13.1 g/dL (05-11 @ 08:33)    Creatinine, Serum 1.10 (05-12 @ 10:03)  Creatinine, Serum 1.20 (05-11 @ 08:33)

## 2022-05-13 NOTE — PROGRESS NOTE ADULT - ASSESSMENT
55 y/o M with HTN, HLD, T1DM (on Insulin pump), CKD s/p renal transplant, CVA with residual, DVT s/p Eliquis, CARMEN (on nocturnal CPAP), Restrictive Lung Disease, squamous cell carcinoma OM Left 4th toe s/p amputation, and right 2nd toe amputation presented to the ED for right 5th toe ulcer.  Planned for right foot 5th metatarsal head resection & 5th metatarsal base exostectomy in 5/11.     HTN, HLD, Rt foot ulcer   - Continue losartan, clonidine, hydralazine and metoprolol , bp 123/68  - Continue Lipitor and asa  - TTE 07/2017 showed conc LVH, normal LV size and function  - No evidence of any meaningful volume overload   - S/p Resection of head of fifth metatarsal bone, Resection of metatarsal bone 5/11/22  - Tolerated well without cardiac complications   -fu pathology     - Monitor and replete lytes, keep K>4, Mg>2.  Jennifer Ross FNP-C  Cardiology NP  [id number] [phone]

## 2022-05-13 NOTE — PROGRESS NOTE ADULT - SUBJECTIVE AND OBJECTIVE BOX
[facility] -- Julisa Jansen, Luly Larson, Karan Weldon Savella  Office # [phone]      Follow Up:    htn hld  Subjective/Observations:   No events overnight resting comfortably in bed.  No complaints of chest pain, dyspnea, or palpitations reported. No signs of orthopnea or PND.      REVIEW OF SYSTEMS: All other review of systems is negative unless indicated above    PAST MEDICAL & SURGICAL HISTORY:  Hypertension      Hyperlipidemia      Diabetes mellitus  Type 1 on insulin pump      CKD (chronic kidney disease)  Renal Transplant 2013 at [facility]      Diabetic peripheral neuropathy      Obesity (BMI 30-39.9)      CVA (cerebral vascular accident)  Wallenberg Stroke  low L body sensation  low R face sensation  decreased peripheral vision  poor balance      Squamous cell carcinoma of skin of left ear      History of DVT (deep vein thrombosis)  s/p eliquis      Recurrent squamous cell carcinoma of skin  nose, L arm      Toe infection  2007 L 4th Toe surgery-amputation secondary to osteomyelitis      Ear disease  Left inner ear surgery, 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 1000 milliLiter(s) (50 mL/Hr) IV Continuous <Continuous>  dextrose 50% Injectable 25 Gram(s) IV Push once  gabapentin 300 milliGRAM(s) Oral two times a day  glucagon  Injectable 1 milliGRAM(s) IntraMuscular once  hydrALAZINE 25 milliGRAM(s) Oral three times a day  insulin lispro (ADMELOG) Pump 1 Each SubCutaneous Continuous Pump  losartan 25 milliGRAM(s) Oral daily  metoprolol tartrate 75 milliGRAM(s) Oral every 12 hours  piperacillin/tazobactam IVPB.. 3.375 Gram(s) IV Intermittent every 8 hours  tacrolimus 1 milliGRAM(s) Oral <User Schedule>  tacrolimus 0.5 milliGRAM(s) Oral <User Schedule>  trimethoprim   80 mG/sulfamethoxazole 400 mG 1 Tablet(s) Oral daily    MEDICATIONS  (PRN):  acetaminophen     Tablet .. 650 milliGRAM(s) Oral every 6 hours PRN Temp greater or equal to 38C (100.4F), Mild Pain (1 - 3)  aluminum hydroxide/magnesium hydroxide/simethicone Suspension 30 milliLiter(s) Oral every 4 hours PRN Dyspepsia  dextrose Oral Gel 15 Gram(s) Oral once PRN Blood Glucose LESS THAN 70 milliGRAM(s)/deciliter  melatonin 3 milliGRAM(s) Oral at bedtime PRN Insomnia      Allergies    No Known Allergies    Intolerances        Vital Signs Last 24 Hrs  T(C): 36.3 (13 May 2022 04:54), Max: 38.1 (12 May 2022 14:01)  T(F): 97.3 (13 May 2022 04:54), Max: 100.5 (12 May 2022 14:01)  HR: 63 (13 May 2022 04:54) (63 - 89)  BP: 126/79 (13 May 2022 04:54) (126/72 - 149/72)  BP(mean): --  RR: 18 (13 May 2022 04:54) (16 - 19)  SpO2: 98% (13 May 2022 04:54) (94% - 98%)    I&O's Summary    12 May 2022 07:01  -  13 May 2022 07:00  --------------------------------------------------------  IN: 0 mL / OUT: 500 mL / NET: -500 mL          PHYSICAL EXAM:  TELE: Sr 1st AVB  Constitutional: NAD, awake and alert, obese  HEENT: Moist Mucous Membranes, Anicteric  Pulmonary: Non-labored, breath sounds are clear bilaterally, No wheezing, crackles or rhonchi  Cardiovascular: Regular, S1 and S2 nl, No murmurs, rubs, gallops or clicks  Gastrointestinal: Bowel Sounds present, soft, nontender.   Lymph: No lymphadenopathy. No peripheral edema.  Skin: right foot dressing   Psych:  Mood & affect appropriate    LABS: All Labs Reviewed:                        12.5   7.61  )-----------( 237      ( 12 May 2022 10:03 )             39.7                         13.1   7.53  )-----------( 229      ( 11 May 2022 08:33 )             39.7                         14.3   7.98  )-----------( 245      ( 10 May 2022 14:58 )             43.1     12 May 2022 10:03    140    |  104    |  17     ----------------------------<  160    4.1     |  31     |  1.10   11 May 2022 08:33    140    |  105    |  18     ----------------------------<  126    4.0     |  31     |  1.20   10 May 2022 14:58    138    |  105    |  20     ----------------------------<  195    4.3     |  29     |  1.10     Ca    9.2        12 May 2022 10:03  Ca    9.2        11 May 2022 08:33  Ca    9.1        10 May 2022 14:58  Mg     2.0       12 May 2022 10:03  Mg     2.0       11 May 2022 08:33    TPro  8.0    /  Alb  3.5    /  TBili  0.5    /  DBili  x      /  AST  13     /  ALT  18     /  AlkPhos  107    10 May 2022 14:58        12 Lead ECG:   Ventricular Rate 63 BPM    Atrial Rate 63 BPM    P-R Interval 256 ms    QRS Duration 96 ms    Q-T Interval 404 ms    QTC Calculation(Bazett) 413 ms    P Axis 23 degrees    R Axis -4 degrees    T Axis 20 degrees    Diagnosis Line Sinus rhythm with 1st degree AV block  Otherwise normal ECG  Confirmed by ANDREW WELDON ([id number]) on 5/11/2022 11:14:58 AM (05-11-22 @ 08:49)      Patient name: LUTHER NORIEGA  YOB: 1965   Age: 51 (M)   MR#: [id number]  Study Date: 7/27/2017  Location: O/PSonographer: Susie Zuleta RDCS  Study quality: Technically difficult  Referring Physician: Kashmir Ochoa MD  Blood Pressure: 150/77 mmHg  Height: 185 cm  Weight: 136 kg  BSA: 2.6 m2  ------------------------------------------------------------------------  PROCEDURE: Transthoracic echocardiogram with 2-D, M-Mode  and complete spectral and color flow Doppler. Intravenous  catheter inserted. Verbal consent was obtained for  injection of echo contrast following a discussion of risks  and benefits. Following intravenous injection of contrast,  harmonic imaging was performed.  INDICATION: Primary pulmonary hypertension(I27.0)  ------------------------------------------------------------------------  Dimensions:    Normal Values:  LA:     4.0    2.0 - 4.0 cm  Ao:     3.5    2.0 - 3.8 cm  SEPTUM: 1.1    0.6 - 1.2 cm  PWT:    1.2    0.6 - 1.1 cm  LVIDd:  4.9    3.0 -5.6 cm  LVIDs:  3.3    1.8 - 4.0 cm  Derived variables:  LVMI: 83 g/m2  RWT: 0.48  Fractional short: 33 %  EF (Teicholtz): 61 %  ------------------------------------------------------------------------  Observations:  Mitral Valve: Normal mitral valve. Minimal mitral  regurgitation.  Aortic Valve/Aorta: Normal trileaflet aortic valve.  Aortic Root: 3.5 cm.  Left Atrium: Normal left atrium.  LA volume index = 20  cc/m2.  Left Ventricle: Normal left ventricular systolic function.  No segmental wallmotion abnormalities. Endocardial  visualization enhanced with intravenous injection of echo  contrast (Definity). Increased relative wall thickness with  normal left ventricular mass index, consistent with  concentric left ventricular remodeling.  Right Heart: Normal right atrium. Right ventricular  enlargement with normal right ventricular systolic  function. Normal tricuspid valve. Minimal tricuspid  regurgitation. Normal pulmonic valve.  Pericardium/Pleura: Normal pericardium with no pericardial  effusion.  Hemodynamic: Estimated right atrial pressure is 8 mm Hg.  Estimated right ventricular systolic pressure equals 29 mm  Hg, assuming right atrial pressure equals 8 mm Hg,  consistent with normal pulmonary pressures.  ------------------------------------------------------------------------  Conclusions:  1. Increased relative wall thickness with normal left  ventricular mass index, consistent with concentric left  ventricular remodeling.  2. Normal left ventricular systolic function. No segmental  wall motion abnormalities. Endocardial visualization  enhanced with intravenous injection of echo contrast  (Definity).  --------------------------------------------

## 2022-05-13 NOTE — PROGRESS NOTE ADULT - SUBJECTIVE AND OBJECTIVE BOX
[facility] DIVISION of INFECTIOUS DISEASE  Marko Saucedo MD PhD, Qiana Steven MD, Claire Castro MD, Tal Russo MD, David Bravo MD  and providing coverage with Krista Del Valle MD  Providing Infectious Disease Consultations at [facility], [facility], [facility]'s    Office# [phone] to schedule follow up appointments  Answering Service for urgent calls or New Consults [phone]  Cell# to text for urgent issues Makro Saucedo [phone]     infectious diseases progress note:    LUTHER NORIEGA is a 56y y. o. Male patient    No concerning overnight events    Allergies    No Known Allergies    Intolerances        ANTIBIOTICS/RELEVANT:  antimicrobials  piperacillin/tazobactam IVPB.. 3.375 Gram(s) IV Intermittent every 8 hours  trimethoprim   80 mG/sulfamethoxazole 400 mG 1 Tablet(s) Oral daily    immunologic:  azaTHIOprine 100 milliGRAM(s) Oral daily  tacrolimus 1 milliGRAM(s) Oral <User Schedule>  tacrolimus 0.5 milliGRAM(s) Oral <User Schedule>    OTHER:  acetaminophen     Tablet .. 650 milliGRAM(s) Oral every 6 hours PRN  aluminum hydroxide/magnesium hydroxide/simethicone Suspension 30 milliLiter(s) Oral every 4 hours PRN  aspirin enteric coated 162 milliGRAM(s) Oral daily  atorvastatin 40 milliGRAM(s) Oral at bedtime  cloNIDine 0.1 milliGRAM(s) Oral two times a day  dextrose 5%. 1000 milliLiter(s) IV Continuous <Continuous>  dextrose 50% Injectable 25 Gram(s) IV Push once  dextrose Oral Gel 15 Gram(s) Oral once PRN  enoxaparin Injectable 40 milliGRAM(s) SubCutaneous every 24 hours  gabapentin 300 milliGRAM(s) Oral two times a day  glucagon  Injectable 1 milliGRAM(s) IntraMuscular once  hydrALAZINE 25 milliGRAM(s) Oral three times a day  insulin lispro (ADMELOG) Pump 1 Each SubCutaneous Continuous Pump  losartan 25 milliGRAM(s) Oral daily  melatonin 3 milliGRAM(s) Oral at bedtime PRN  metoprolol tartrate 75 milliGRAM(s) Oral every 12 hours      Objective:  Vital Signs Last 24 Hrs  T(C): 36.9 (13 May 2022 12:37), Max: 38.1 (12 May 2022 14:01)  T(F): 98.4 (13 May 2022 12:37), Max: 100.5 (12 May 2022 14:01)  HR: 69 (13 May 2022 12:37) (63 - 89)  BP: 146/75 (13 May 2022 12:37) (126/72 - 149/72)  BP(mean): --  RR: 18 (13 May 2022 12:37) (16 - 19)  SpO2: 91% (13 May 2022 12:37) (91% - 98%)    T(C): 36.9 (05-13-22 @ 12:37), Max: 38.1 (05-12-22 @ 14:01)  T(C): 36.9 (05-13-22 @ 12:37), Max: 38.1 (05-12-22 @ 14:01)  T(C): 36.9 (05-13-22 @ 12:37), Max: 38.1 (05-12-22 @ 14:01)    PHYSICAL EXAM:  HEENT: NC atraumatic  Neck: supple  Respiratory: no accessory muscle use, breathing comfortably  Cardiovascular: distant  Gastrointestinal: normal appearing, nondistended  Extremities: no clubbing, no cyanosis,        LABS:                          12.5   7.61  )-----------( 237      ( 12 May 2022 10:03 )             39.7       WBC  7.61 05-12 @ 10:03  7.53 05-11 @ 08:33  7.98 05-10 @ 14:58      05-12    140  |  104  |  17  ----------------------------<  160<H>  4.1   |  31  |  1.10    Ca    9.2      12 May 2022 10:03  Mg     2.0     05-12        Creatinine, Serum: 1.10 mg/dL (05-12-22 @ 10:03)  Creatinine, Serum: 1.20 mg/dL (05-11-22 @ 08:33)  Creatinine, Serum: 1.10 mg/dL (05-10-22 @ 14:58)                INFLAMMATORY MARKERS  Auto Lymphocyte #: 1.13 K/uL (05-10-22 @ 14:58)  Auto Neutrophil #: 5.92 K/uL (05-10-22 @ 14:58)    Lactate, Blood: 0.8 mmol/L (05-10-22 @ 15:09)    Auto Eosinophil #: 0.29 K/uL (05-10-22 @ 14:58)      Sedimentation Rate, Erythrocyte: 25 mm/hr (05-11-22 @ 08:33)  Sedimentation Rate, Erythrocyte: 25 mm/hr (05-10-22 @ 14:58)    Procalcitonin, Serum: <0.05 (05-11-22 @ 08:33)      Activated Partial Thromboplastin Time: 29.0 sec (05-10-22 @ 14:58)  INR: 1.03 ratio (05-10-22 @ 14:58)          MICROBIOLOGY:              RADIOLOGY & ADDITIONAL STUDIES:

## 2022-05-13 NOTE — PROGRESS NOTE ADULT - SUBJECTIVE AND OBJECTIVE BOX
56y year old Male seen at [facility] 2[facility]AS 214 D1 for Pt s/p RIGHT foot 5th metatarsal head and base resection, POD2 (DOS: 5/11/2022). Pt AAOx3 in NAD. Pt resting comfortably. Pt denies any pain to RLE at this time. Pt tolerating diet, voiding without any difficulties. Pt complaining of fever. Denies any chills, nausea, vomiting, chest pain, shortness of breath, or calf pain at this time.       Allergies    No Known Allergies    Intolerances    MEDICATIONS  (STANDING):  aspirin enteric coated 162 milliGRAM(s) Oral daily  atorvastatin 40 milliGRAM(s) Oral at bedtime  azaTHIOprine 100 milliGRAM(s) Oral daily  cloNIDine 0.1 milliGRAM(s) Oral two times a day  dextrose 5%. 1000 milliLiter(s) (50 mL/Hr) IV Continuous <Continuous>  dextrose 50% Injectable 25 Gram(s) IV Push once  enoxaparin Injectable 40 milliGRAM(s) SubCutaneous every 24 hours  gabapentin 300 milliGRAM(s) Oral two times a day  glucagon  Injectable 1 milliGRAM(s) IntraMuscular once  hydrALAZINE 25 milliGRAM(s) Oral three times a day  insulin lispro (ADMELOG) Pump 1 Each SubCutaneous Continuous Pump  losartan 25 milliGRAM(s) Oral daily  metoprolol tartrate 75 milliGRAM(s) Oral every 12 hours  piperacillin/tazobactam IVPB.. 3.375 Gram(s) IV Intermittent every 8 hours  tacrolimus 1 milliGRAM(s) Oral <User Schedule>  tacrolimus 0.5 milliGRAM(s) Oral <User Schedule>  trimethoprim   80 mG/sulfamethoxazole 400 mG 1 Tablet(s) Oral daily    MEDICATIONS  (PRN):  acetaminophen     Tablet .. 650 milliGRAM(s) Oral every 6 hours PRN Temp greater or equal to 38C (100.4F), Mild Pain (1 - 3)  aluminum hydroxide/magnesium hydroxide/simethicone Suspension 30 milliLiter(s) Oral every 4 hours PRN Dyspepsia  dextrose Oral Gel 15 Gram(s) Oral once PRN Blood Glucose LESS THAN 70 milliGRAM(s)/deciliter  melatonin 3 milliGRAM(s) Oral at bedtime PRN Insomnia    ICU Vital Signs Last 24 Hrs  T(C): 36.9 (13 May 2022 12:37), Max: 38.1 (12 May 2022 14:01)  T(F): 98.4 (13 May 2022 12:37), Max: 100.5 (12 May 2022 14:01)  HR: 69 (13 May 2022 12:37) (63 - 89)  BP: 146/75 (13 May 2022 12:37) (126/72 - 149/72)  BP(mean): --  ABP: --  ABP(mean): --  RR: 18 (13 May 2022 12:37) (16 - 19)  SpO2: 91% (13 May 2022 12:37) (91% - 98%)      PHYSICAL EXAM:  Vascular:  DP palpable bilaterally; PT palpable bilaterally, Capillary refill (CFT) 3 seconds. Digital hair absent bilaterally.   Neurological: Light touch sensation diminished bilaterally.  Musculoskeletal:  Left 4th toe s/p amputation and distal phalanx amputation of the 2 & 3rd. Right 2nd toe amputation. 4/5 strength in all quadrants bilaterally, AJ & STJ ROM absent b/l.   Dermatological: Surgical incisions noted to the lateral aspect of right foot. Sutures clean, dry, and intact. No signs of wound dehiscence. Right lateral 5th metatarsal head 1 x 1 x 0.2 cm and 5th metatarsal base 4 x 4 x 0.3 cm ulcers. All wound beds granular, with no periwound erythema. No purulence, no undermining, probes to bone.                          12.5   7.61  )-----------( 237      ( 12 May 2022 10:03 )             39.7       Culture - Tissue with Gram Stain (collected 11 May 2022 23:50)  Source: .Tissue Other, right 5th metatarsal base bone  Gram Stain (12 May 2022 03:40):    No polymorphonuclear cells seen per low power field    No organisms seen per oil power field    Culture - Tissue with Gram Stain (collected 11 May 2022 23:50)  Source: .Tissue Other, right 5th metatarsal head bone  Gram Stain (12 May 2022 03:40):    No polymorphonuclear cells seen per low power field    No organisms seen per oil power field    Culture - Urine (collected 11 May 2022 00:44)  Source: Clean Catch Clean Catch (Midstream)  Final Report (11 May 2022 20:41):    No growth    Culture - Blood (collected 10 May 2022 18:50)  Source: .Blood Blood-Peripheral  Preliminary Report (11 May 2022 19:02):    No growth to date.    Culture - Blood (collected 10 May 2022 18:50)  Source: .Blood Blood-Peripheral  Preliminary Report (11 May 2022 19:02):    No growth to date.      ACC: [id number] EXAM: XR FOOT COMP MIN 3 VIEWS RT    PROCEDURE DATE: 05/11/2022        INTERPRETATION: RIGHT foot    CLINICAL INFORMATION: Postoperative RIGHT foot radiographs TECHNIQUE: AP,lateral and oblique views.    FINDINGS:  Status post resection of the head of the fifth metatarsal bone. RIGHT fifth phalanges in situ. Remaining osseous and joint structures intact.    IMPRESSION:    Postop changes as described    --- End of Report ---

## 2022-05-14 LAB
ANION GAP SERPL CALC-SCNC: 5 MMOL/L — SIGNIFICANT CHANGE UP (ref 5–17)
BUN SERPL-MCNC: 16 MG/DL — SIGNIFICANT CHANGE UP (ref 7–23)
CALCIUM SERPL-MCNC: 9.3 MG/DL — SIGNIFICANT CHANGE UP (ref 8.5–10.1)
CHLORIDE SERPL-SCNC: 104 MMOL/L — SIGNIFICANT CHANGE UP (ref 96–108)
CO2 SERPL-SCNC: 31 MMOL/L — SIGNIFICANT CHANGE UP (ref 22–31)
CREAT SERPL-MCNC: 1.1 MG/DL — SIGNIFICANT CHANGE UP (ref 0.5–1.3)
EGFR: 79 ML/MIN/1.73M2 — SIGNIFICANT CHANGE UP
GLUCOSE SERPL-MCNC: 71 MG/DL — SIGNIFICANT CHANGE UP (ref 70–99)
HCT VFR BLD CALC: 38 % — LOW (ref 39–50)
HGB BLD-MCNC: 12.3 G/DL — LOW (ref 13–17)
MAGNESIUM SERPL-MCNC: 2.1 MG/DL — SIGNIFICANT CHANGE UP (ref 1.6–2.6)
MCHC RBC-ENTMCNC: 28.3 PG — SIGNIFICANT CHANGE UP (ref 27–34)
MCHC RBC-ENTMCNC: 32.4 GM/DL — SIGNIFICANT CHANGE UP (ref 32–36)
MCV RBC AUTO: 87.4 FL — SIGNIFICANT CHANGE UP (ref 80–100)
NRBC # BLD: 0 /100 WBCS — SIGNIFICANT CHANGE UP (ref 0–0)
PLATELET # BLD AUTO: 207 K/UL — SIGNIFICANT CHANGE UP (ref 150–400)
POTASSIUM SERPL-MCNC: 4.2 MMOL/L — SIGNIFICANT CHANGE UP (ref 3.5–5.3)
POTASSIUM SERPL-SCNC: 4.2 MMOL/L — SIGNIFICANT CHANGE UP (ref 3.5–5.3)
PROCALCITONIN SERPL-MCNC: 0.08 NG/ML — HIGH (ref 0–0.04)
RBC # BLD: 4.35 M/UL — SIGNIFICANT CHANGE UP (ref 4.2–5.8)
RBC # FLD: 14.6 % — HIGH (ref 10.3–14.5)
SODIUM SERPL-SCNC: 140 MMOL/L — SIGNIFICANT CHANGE UP (ref 135–145)
WBC # BLD: 6.52 K/UL — SIGNIFICANT CHANGE UP (ref 3.8–10.5)
WBC # FLD AUTO: 6.52 K/UL — SIGNIFICANT CHANGE UP (ref 3.8–10.5)

## 2022-05-14 RX ADMIN — Medication 25 MILLIGRAM(S): at 21:17

## 2022-05-14 RX ADMIN — PIPERACILLIN AND TAZOBACTAM 25 GRAM(S): 4; .5 INJECTION, POWDER, LYOPHILIZED, FOR SOLUTION INTRAVENOUS at 05:16

## 2022-05-14 RX ADMIN — TACROLIMUS 0.5 MILLIGRAM(S): 5 CAPSULE ORAL at 21:17

## 2022-05-14 RX ADMIN — Medication 1 TABLET(S): at 11:40

## 2022-05-14 RX ADMIN — Medication 3 MILLIGRAM(S): at 21:17

## 2022-05-14 RX ADMIN — Medication 162 MILLIGRAM(S): at 11:39

## 2022-05-14 RX ADMIN — AZATHIOPRINE 100 MILLIGRAM(S): 100 TABLET ORAL at 11:40

## 2022-05-14 RX ADMIN — Medication 25 MILLIGRAM(S): at 05:10

## 2022-05-14 RX ADMIN — Medication 0.1 MILLIGRAM(S): at 05:11

## 2022-05-14 RX ADMIN — TACROLIMUS 0.5 MILLIGRAM(S): 5 CAPSULE ORAL at 09:37

## 2022-05-14 RX ADMIN — ENOXAPARIN SODIUM 40 MILLIGRAM(S): 100 INJECTION SUBCUTANEOUS at 11:39

## 2022-05-14 RX ADMIN — TACROLIMUS 1 MILLIGRAM(S): 5 CAPSULE ORAL at 21:17

## 2022-05-14 RX ADMIN — Medication 75 MILLIGRAM(S): at 17:12

## 2022-05-14 RX ADMIN — TACROLIMUS 1 MILLIGRAM(S): 5 CAPSULE ORAL at 09:37

## 2022-05-14 RX ADMIN — GABAPENTIN 300 MILLIGRAM(S): 400 CAPSULE ORAL at 17:12

## 2022-05-14 RX ADMIN — Medication 75 MILLIGRAM(S): at 05:11

## 2022-05-14 RX ADMIN — Medication 0.1 MILLIGRAM(S): at 17:12

## 2022-05-14 RX ADMIN — Medication 25 MILLIGRAM(S): at 15:14

## 2022-05-14 RX ADMIN — ATORVASTATIN CALCIUM 40 MILLIGRAM(S): 80 TABLET, FILM COATED ORAL at 21:17

## 2022-05-14 RX ADMIN — GABAPENTIN 300 MILLIGRAM(S): 400 CAPSULE ORAL at 05:10

## 2022-05-14 RX ADMIN — PIPERACILLIN AND TAZOBACTAM 25 GRAM(S): 4; .5 INJECTION, POWDER, LYOPHILIZED, FOR SOLUTION INTRAVENOUS at 21:18

## 2022-05-14 RX ADMIN — PIPERACILLIN AND TAZOBACTAM 25 GRAM(S): 4; .5 INJECTION, POWDER, LYOPHILIZED, FOR SOLUTION INTRAVENOUS at 15:08

## 2022-05-14 RX ADMIN — LOSARTAN POTASSIUM 25 MILLIGRAM(S): 100 TABLET, FILM COATED ORAL at 05:11

## 2022-05-14 NOTE — PROGRESS NOTE ADULT - SUBJECTIVE AND OBJECTIVE BOX
Vital Signs Last 24 Hrs  T(C): 36.4 (14 May 2022 04:21), Max: 37.5 (13 May 2022 20:18)  T(F): 97.5 (14 May 2022 04:21), Max: 99.5 (13 May 2022 20:18)  HR: 76 (14 May 2022 04:21) (69 - 76)  BP: 119/74 (14 May 2022 04:21) (119/74 - 158/74)  BP(mean): --  RR: 18 (14 May 2022 04:21) (18 - 18)  SpO2: 97% (14 May 2022 04:21) (91% - 97%)    05-14    140  |  104  |  16  ----------------------------<  71  4.2   |  31  |  1.10    Ca    9.3      14 May 2022 07:47  Mg     2.1     05-14                            12.3   6.52  )-----------( 207      ( 14 May 2022 07:47 )             38.0       CAPILLARY BLOOD GLUCOSE      POCT Blood Glucose.: 100 mg/dL (14 May 2022 07:25)  POCT Blood Glucose.: 191 mg/dL (13 May 2022 21:26)  POCT Blood Glucose.: 123 mg/dL (13 May 2022 16:27)  POCT Blood Glucose.: 159 mg/dL (13 May 2022 11:57)              acetaminophen     Tablet .. 650 milliGRAM(s) Oral every 6 hours PRN  aluminum hydroxide/magnesium hydroxide/simethicone Suspension 30 milliLiter(s) Oral every 4 hours PRN  aspirin enteric coated 162 milliGRAM(s) Oral daily  atorvastatin 40 milliGRAM(s) Oral at bedtime  azaTHIOprine 100 milliGRAM(s) Oral daily  cloNIDine 0.1 milliGRAM(s) Oral two times a day  dextrose 5%. 1000 milliLiter(s) IV Continuous <Continuous>  dextrose 50% Injectable 25 Gram(s) IV Push once  dextrose Oral Gel 15 Gram(s) Oral once PRN  enoxaparin Injectable 40 milliGRAM(s) SubCutaneous every 24 hours  gabapentin 300 milliGRAM(s) Oral two times a day  glucagon  Injectable 1 milliGRAM(s) IntraMuscular once  hydrALAZINE 25 milliGRAM(s) Oral three times a day  insulin lispro (ADMELOG) Pump 1 Each SubCutaneous Continuous Pump  losartan 25 milliGRAM(s) Oral daily  melatonin 3 milliGRAM(s) Oral at bedtime PRN  metoprolol tartrate 75 milliGRAM(s) Oral every 12 hours  piperacillin/tazobactam IVPB.. 3.375 Gram(s) IV Intermittent every 8 hours  tacrolimus 1 milliGRAM(s) Oral <User Schedule>  tacrolimus 0.5 milliGRAM(s) Oral <User Schedule>  trimethoprim   80 mG/sulfamethoxazole 400 mG 1 Tablet(s) Oral daily  Patient is a 56y old  Male who presents with a chief complaint of R 5th toe infection (12 May 2022 10:27)      INTERVAL HPI/OVERNIGHT EVENTS: Patient seen and examined. NAD. No complaints.           REVIEW OF SYSTEMS:  CONSTITUTIONAL: + fever, no weight loss, or no fatigue  NECK: No pain, no stiffness  RESPIRATORY: No cough, no wheezing, no chills, no hemoptysis, No shortness of breath  CARDIOVASCULAR: No chest pain, no palpitations, no dizziness, no leg swelling  GASTROINTESTINAL: No abdominal pain. No nausea, no vomiting, no hematemesis; No diarrhea, no constipation. No melena, no hematochezia.  GENITOURINARY: No dysuria, no frequency, no hematuria, no incontinence  NEUROLOGICAL: No headaches, no loss of strength, no numbness, no tremors  SKIN: No itching, no burning  MUSCULOSKELETAL: No joint pain, no swelling; No muscle, no back, no extremity pain  PSYCHIATRIC: No depression, no mood swings,   HEME/LYMPH: No easy bruising, no bleeding gums  ALLERY AND IMMUNOLOGIC: No hives       Consultant(s) Notes Reviewed:  [X] YES  [ ] NO    PHYSICAL EXAM:  GENERAL: NAD  HEAD:  Atraumatic, Normocephalic  EYES: EOMI, PERRLA, conjunctiva and sclera clear  ENMT: No tonsillar erythema, exudates, or enlargement; Moist mucous membranes  NECK: Supple, No JVD  NERVOUS SYSTEM:  Awake & alert  CHEST/LUNG: Clear to auscultation bilaterally; No rales, rhonchi, wheezing,  HEART: Regular rate and rhythm  ABDOMEN: Soft, Nontender, Nondistended; Bowel sounds present  EXTREMITIES:  No clubbing, cyanosis, or edema; right foot bandaged  LYMPH: No lymphadenopathy noted  SKIN: No rashes      Advanced care planning discussed with patient/family [X] YES   [ ] NO    Advanced care planning discussed with patient/family. Patient's health status was discussed. All appropriate changes have been made regarding patient's end-of-life care. Advanced care planning forms reviewed/discussed/completed.  20 minutes spent.

## 2022-05-15 RX ADMIN — TACROLIMUS 0.5 MILLIGRAM(S): 5 CAPSULE ORAL at 09:32

## 2022-05-15 RX ADMIN — Medication 25 MILLIGRAM(S): at 13:59

## 2022-05-15 RX ADMIN — TACROLIMUS 1 MILLIGRAM(S): 5 CAPSULE ORAL at 09:32

## 2022-05-15 RX ADMIN — Medication 0.1 MILLIGRAM(S): at 05:07

## 2022-05-15 RX ADMIN — PIPERACILLIN AND TAZOBACTAM 25 GRAM(S): 4; .5 INJECTION, POWDER, LYOPHILIZED, FOR SOLUTION INTRAVENOUS at 05:06

## 2022-05-15 RX ADMIN — Medication 75 MILLIGRAM(S): at 18:58

## 2022-05-15 RX ADMIN — Medication 3 MILLIGRAM(S): at 21:07

## 2022-05-15 RX ADMIN — GABAPENTIN 300 MILLIGRAM(S): 400 CAPSULE ORAL at 05:06

## 2022-05-15 RX ADMIN — TACROLIMUS 0.5 MILLIGRAM(S): 5 CAPSULE ORAL at 21:10

## 2022-05-15 RX ADMIN — ENOXAPARIN SODIUM 40 MILLIGRAM(S): 100 INJECTION SUBCUTANEOUS at 11:48

## 2022-05-15 RX ADMIN — GABAPENTIN 300 MILLIGRAM(S): 400 CAPSULE ORAL at 18:58

## 2022-05-15 RX ADMIN — Medication 162 MILLIGRAM(S): at 11:43

## 2022-05-15 RX ADMIN — PIPERACILLIN AND TAZOBACTAM 25 GRAM(S): 4; .5 INJECTION, POWDER, LYOPHILIZED, FOR SOLUTION INTRAVENOUS at 21:07

## 2022-05-15 RX ADMIN — AZATHIOPRINE 100 MILLIGRAM(S): 100 TABLET ORAL at 11:48

## 2022-05-15 RX ADMIN — Medication 75 MILLIGRAM(S): at 05:07

## 2022-05-15 RX ADMIN — Medication 0.1 MILLIGRAM(S): at 18:58

## 2022-05-15 RX ADMIN — LOSARTAN POTASSIUM 25 MILLIGRAM(S): 100 TABLET, FILM COATED ORAL at 05:06

## 2022-05-15 RX ADMIN — Medication 25 MILLIGRAM(S): at 05:06

## 2022-05-15 RX ADMIN — Medication 1 TABLET(S): at 11:43

## 2022-05-15 RX ADMIN — PIPERACILLIN AND TAZOBACTAM 25 GRAM(S): 4; .5 INJECTION, POWDER, LYOPHILIZED, FOR SOLUTION INTRAVENOUS at 13:58

## 2022-05-15 RX ADMIN — Medication 25 MILLIGRAM(S): at 21:08

## 2022-05-15 RX ADMIN — ATORVASTATIN CALCIUM 40 MILLIGRAM(S): 80 TABLET, FILM COATED ORAL at 21:07

## 2022-05-15 RX ADMIN — TACROLIMUS 1 MILLIGRAM(S): 5 CAPSULE ORAL at 21:08

## 2022-05-15 NOTE — PROGRESS NOTE ADULT - SUBJECTIVE AND OBJECTIVE BOX
CAPILLARY BLOOD GLUCOSE      POCT Blood Glucose.: 164 mg/dL (15 May 2022 11:11)  POCT Blood Glucose.: 89 mg/dL (15 May 2022 07:28)  POCT Blood Glucose.: 245 mg/dL (14 May 2022 21:09)  POCT Blood Glucose.: 92 mg/dL (14 May 2022 16:44)      Vital Signs Last 24 Hrs  T(C): 36.6 (15 May 2022 05:06), Max: 37.1 (14 May 2022 21:17)  T(F): 97.8 (15 May 2022 05:06), Max: 98.8 (14 May 2022 21:17)  HR: 63 (15 May 2022 05:06) (63 - 64)  BP: 115/74 (15 May 2022 05:06) (115/74 - 151/82)  BP(mean): --  RR: 19 (15 May 2022 05:06) (19 - 20)  SpO2: 96% (15 May 2022 05:06) (93% - 96%)    General: WN/WD NAD  Respiratory: CTA B/L  CV: RRR, S1S2, no murmurs, rubs or gallops  Abdominal: Soft, NT, ND +BS, Last BM  Extremities: le foot dsg intact     05-14    140  |  104  |  16  ----------------------------<  71  4.2   |  31  |  1.10    Ca    9.3      14 May 2022 07:47  Mg     2.1     05-14        atorvastatin 40 milliGRAM(s) Oral at bedtime  dextrose 50% Injectable 25 Gram(s) IV Push once  dextrose Oral Gel 15 Gram(s) Oral once PRN  glucagon  Injectable 1 milliGRAM(s) IntraMuscular once  insulin lispro (ADMELOG) Pump 1 Each SubCutaneous Continuous Pump

## 2022-05-15 NOTE — PROGRESS NOTE ADULT - SUBJECTIVE AND OBJECTIVE BOX
Patient is a 56y old  Male who presents with a chief complaint of R 5th toe infection (14 May 2022 10:03)      INTERVAL HPI/OVERNIGHT EVENTS: Patient seen and examined. NAD. No complaints.    Vital Signs Last 24 Hrs  T(C): 36.6 (15 May 2022 05:06), Max: 37.1 (14 May 2022 21:17)  T(F): 97.8 (15 May 2022 05:06), Max: 98.8 (14 May 2022 21:17)  HR: 63 (15 May 2022 05:06) (63 - 64)  BP: 115/74 (15 May 2022 05:06) (115/74 - 151/82)  BP(mean): --  RR: 19 (15 May 2022 05:06) (19 - 20)  SpO2: 96% (15 May 2022 05:06) (93% - 96%)    05-14    140  |  104  |  16  ----------------------------<  71  4.2   |  31  |  1.10    Ca    9.3      14 May 2022 07:47  Mg     2.1     05-14                            12.3   6.52  )-----------( 207      ( 14 May 2022 07:47 )             38.0       CAPILLARY BLOOD GLUCOSE      POCT Blood Glucose.: 89 mg/dL (15 May 2022 07:28)  POCT Blood Glucose.: 245 mg/dL (14 May 2022 21:09)  POCT Blood Glucose.: 92 mg/dL (14 May 2022 16:44)  POCT Blood Glucose.: 101 mg/dL (14 May 2022 11:19)              acetaminophen     Tablet .. 650 milliGRAM(s) Oral every 6 hours PRN  aluminum hydroxide/magnesium hydroxide/simethicone Suspension 30 milliLiter(s) Oral every 4 hours PRN  aspirin enteric coated 162 milliGRAM(s) Oral daily  atorvastatin 40 milliGRAM(s) Oral at bedtime  azaTHIOprine 100 milliGRAM(s) Oral daily  cloNIDine 0.1 milliGRAM(s) Oral two times a day  dextrose 5%. 1000 milliLiter(s) IV Continuous <Continuous>  dextrose 50% Injectable 25 Gram(s) IV Push once  dextrose Oral Gel 15 Gram(s) Oral once PRN  enoxaparin Injectable 40 milliGRAM(s) SubCutaneous every 24 hours  gabapentin 300 milliGRAM(s) Oral two times a day  glucagon  Injectable 1 milliGRAM(s) IntraMuscular once  hydrALAZINE 25 milliGRAM(s) Oral three times a day  insulin lispro (ADMELOG) Pump 1 Each SubCutaneous Continuous Pump  losartan 25 milliGRAM(s) Oral daily  melatonin 3 milliGRAM(s) Oral at bedtime PRN  metoprolol tartrate 75 milliGRAM(s) Oral every 12 hours  piperacillin/tazobactam IVPB.. 3.375 Gram(s) IV Intermittent every 8 hours  tacrolimus 1 milliGRAM(s) Oral <User Schedule>  tacrolimus 0.5 milliGRAM(s) Oral <User Schedule>  trimethoprim   80 mG/sulfamethoxazole 400 mG 1 Tablet(s) Oral daily              REVIEW OF SYSTEMS:  CONSTITUTIONAL: No fever, no weight loss, or no fatigue  NECK: No pain, no stiffness  RESPIRATORY: No cough, no wheezing, no chills, no hemoptysis, No shortness of breath  CARDIOVASCULAR: No chest pain, no palpitations, no dizziness, no leg swelling  GASTROINTESTINAL: No abdominal pain. No nausea, no vomiting, no hematemesis; No diarrhea, no constipation. No melena, no hematochezia.  GENITOURINARY: No dysuria, no frequency, no hematuria, no incontinence  NEUROLOGICAL: No headaches, no loss of strength, no numbness, no tremors  SKIN: No itching, no burning  MUSCULOSKELETAL: No joint pain, no swelling; No muscle, no back, no extremity pain  PSYCHIATRIC: No depression, no mood swings,   HEME/LYMPH: No easy bruising, no bleeding gums  ALLERY AND IMMUNOLOGIC: No hives       Consultant(s) Notes Reviewed:  [X] YES  [ ] NO    PHYSICAL EXAM:  GENERAL: NAD  HEAD:  Atraumatic, Normocephalic  EYES: EOMI, PERRLA, conjunctiva and sclera clear  ENMT: No tonsillar erythema, exudates, or enlargement; Moist mucous membranes  NECK: Supple, No JVD  NERVOUS SYSTEM:  Awake & alert  CHEST/LUNG: Clear to auscultation bilaterally; No rales, rhonchi, wheezing,  HEART: Regular rate and rhythm  ABDOMEN: Soft, Nontender, Nondistended; Bowel sounds present  EXTREMITIES:  No clubbing, cyanosis, or edema; RLE bandaged  LYMPH: No lymphadenopathy noted  SKIN: No rashes      Advanced care planning discussed with patient/family [X] YES   [ ] NO    Advanced care planning discussed with patient/family. Patient's health status was discussed. All appropriate changes have been made regarding patient's end-of-life care. Advanced care planning forms reviewed/discussed/completed.  20 minutes spent.

## 2022-05-16 ENCOUNTER — TRANSCRIPTION ENCOUNTER (OUTPATIENT)
Age: 57
End: 2022-05-16

## 2022-05-16 VITALS
TEMPERATURE: 97 F | HEART RATE: 68 BPM | OXYGEN SATURATION: 92 % | SYSTOLIC BLOOD PRESSURE: 145 MMHG | RESPIRATION RATE: 18 BRPM | DIASTOLIC BLOOD PRESSURE: 76 MMHG

## 2022-05-16 LAB
ANION GAP SERPL CALC-SCNC: 5 MMOL/L — SIGNIFICANT CHANGE UP (ref 5–17)
BUN SERPL-MCNC: 19 MG/DL — SIGNIFICANT CHANGE UP (ref 7–23)
CALCIUM SERPL-MCNC: 9.7 MG/DL — SIGNIFICANT CHANGE UP (ref 8.5–10.1)
CHLORIDE SERPL-SCNC: 102 MMOL/L — SIGNIFICANT CHANGE UP (ref 96–108)
CO2 SERPL-SCNC: 35 MMOL/L — HIGH (ref 22–31)
CREAT SERPL-MCNC: 1.1 MG/DL — SIGNIFICANT CHANGE UP (ref 0.5–1.3)
CULTURE RESULTS: NO GROWTH — SIGNIFICANT CHANGE UP
CULTURE RESULTS: NO GROWTH — SIGNIFICANT CHANGE UP
EGFR: 79 ML/MIN/1.73M2 — SIGNIFICANT CHANGE UP
GLUCOSE SERPL-MCNC: 142 MG/DL — HIGH (ref 70–99)
POTASSIUM SERPL-MCNC: 4.4 MMOL/L — SIGNIFICANT CHANGE UP (ref 3.5–5.3)
POTASSIUM SERPL-SCNC: 4.4 MMOL/L — SIGNIFICANT CHANGE UP (ref 3.5–5.3)
SODIUM SERPL-SCNC: 142 MMOL/L — SIGNIFICANT CHANGE UP (ref 135–145)
SPECIMEN SOURCE: SIGNIFICANT CHANGE UP
SPECIMEN SOURCE: SIGNIFICANT CHANGE UP

## 2022-05-16 PROCEDURE — 87040 BLOOD CULTURE FOR BACTERIA: CPT

## 2022-05-16 PROCEDURE — 83036 HEMOGLOBIN GLYCOSYLATED A1C: CPT

## 2022-05-16 PROCEDURE — 87070 CULTURE OTHR SPECIMN AEROBIC: CPT

## 2022-05-16 PROCEDURE — 80048 BASIC METABOLIC PNL TOTAL CA: CPT

## 2022-05-16 PROCEDURE — 82962 GLUCOSE BLOOD TEST: CPT

## 2022-05-16 PROCEDURE — 71045 X-RAY EXAM CHEST 1 VIEW: CPT

## 2022-05-16 PROCEDURE — 86900 BLOOD TYPING SEROLOGIC ABO: CPT

## 2022-05-16 PROCEDURE — 73630 X-RAY EXAM OF FOOT: CPT

## 2022-05-16 PROCEDURE — 93971 EXTREMITY STUDY: CPT

## 2022-05-16 PROCEDURE — 87086 URINE CULTURE/COLONY COUNT: CPT

## 2022-05-16 PROCEDURE — 85027 COMPLETE CBC AUTOMATED: CPT

## 2022-05-16 PROCEDURE — 99232 SBSQ HOSP IP/OBS MODERATE 35: CPT

## 2022-05-16 PROCEDURE — 86140 C-REACTIVE PROTEIN: CPT

## 2022-05-16 PROCEDURE — 80197 ASSAY OF TACROLIMUS: CPT

## 2022-05-16 PROCEDURE — 85730 THROMBOPLASTIN TIME PARTIAL: CPT

## 2022-05-16 PROCEDURE — 80053 COMPREHEN METABOLIC PANEL: CPT

## 2022-05-16 PROCEDURE — 36415 COLL VENOUS BLD VENIPUNCTURE: CPT

## 2022-05-16 PROCEDURE — 87075 CULTR BACTERIA EXCEPT BLOOD: CPT

## 2022-05-16 PROCEDURE — 85025 COMPLETE CBC W/AUTO DIFF WBC: CPT

## 2022-05-16 PROCEDURE — 84134 ASSAY OF PREALBUMIN: CPT

## 2022-05-16 PROCEDURE — 84145 PROCALCITONIN (PCT): CPT

## 2022-05-16 PROCEDURE — 88311 DECALCIFY TISSUE: CPT

## 2022-05-16 PROCEDURE — 99285 EMERGENCY DEPT VISIT HI MDM: CPT | Mod: 25

## 2022-05-16 PROCEDURE — 85610 PROTHROMBIN TIME: CPT

## 2022-05-16 PROCEDURE — 86901 BLOOD TYPING SEROLOGIC RH(D): CPT

## 2022-05-16 PROCEDURE — 86850 RBC ANTIBODY SCREEN: CPT

## 2022-05-16 PROCEDURE — G0463: CPT

## 2022-05-16 PROCEDURE — 87637 SARSCOV2&INF A&B&RSV AMP PRB: CPT

## 2022-05-16 PROCEDURE — 96374 THER/PROPH/DIAG INJ IV PUSH: CPT

## 2022-05-16 PROCEDURE — 93005 ELECTROCARDIOGRAM TRACING: CPT

## 2022-05-16 PROCEDURE — 97161 PT EVAL LOW COMPLEX 20 MIN: CPT

## 2022-05-16 PROCEDURE — 88304 TISSUE EXAM BY PATHOLOGIST: CPT

## 2022-05-16 PROCEDURE — 85652 RBC SED RATE AUTOMATED: CPT

## 2022-05-16 PROCEDURE — 83735 ASSAY OF MAGNESIUM: CPT

## 2022-05-16 PROCEDURE — 81001 URINALYSIS AUTO W/SCOPE: CPT

## 2022-05-16 PROCEDURE — 83605 ASSAY OF LACTIC ACID: CPT

## 2022-05-16 RX ADMIN — AZATHIOPRINE 100 MILLIGRAM(S): 100 TABLET ORAL at 12:21

## 2022-05-16 RX ADMIN — Medication 0.1 MILLIGRAM(S): at 17:21

## 2022-05-16 RX ADMIN — Medication 1 TABLET(S): at 12:21

## 2022-05-16 RX ADMIN — GABAPENTIN 300 MILLIGRAM(S): 400 CAPSULE ORAL at 05:13

## 2022-05-16 RX ADMIN — TACROLIMUS 1 MILLIGRAM(S): 5 CAPSULE ORAL at 10:14

## 2022-05-16 RX ADMIN — Medication 162 MILLIGRAM(S): at 12:21

## 2022-05-16 RX ADMIN — Medication 25 MILLIGRAM(S): at 17:21

## 2022-05-16 RX ADMIN — Medication 75 MILLIGRAM(S): at 17:20

## 2022-05-16 RX ADMIN — PIPERACILLIN AND TAZOBACTAM 25 GRAM(S): 4; .5 INJECTION, POWDER, LYOPHILIZED, FOR SOLUTION INTRAVENOUS at 15:39

## 2022-05-16 RX ADMIN — Medication 25 MILLIGRAM(S): at 05:09

## 2022-05-16 RX ADMIN — TACROLIMUS 0.5 MILLIGRAM(S): 5 CAPSULE ORAL at 10:13

## 2022-05-16 RX ADMIN — PIPERACILLIN AND TAZOBACTAM 25 GRAM(S): 4; .5 INJECTION, POWDER, LYOPHILIZED, FOR SOLUTION INTRAVENOUS at 05:08

## 2022-05-16 RX ADMIN — GABAPENTIN 300 MILLIGRAM(S): 400 CAPSULE ORAL at 17:21

## 2022-05-16 RX ADMIN — LOSARTAN POTASSIUM 25 MILLIGRAM(S): 100 TABLET, FILM COATED ORAL at 05:09

## 2022-05-16 RX ADMIN — ENOXAPARIN SODIUM 40 MILLIGRAM(S): 100 INJECTION SUBCUTANEOUS at 12:21

## 2022-05-16 RX ADMIN — Medication 0.1 MILLIGRAM(S): at 05:08

## 2022-05-16 NOTE — PROGRESS NOTE ADULT - PROVIDER SPECIALTY LIST ADULT
Infectious Disease
Podiatry
Anesthesia
Cardiology
Infectious Disease
Nephrology
Cardiology
Endocrinology
Infectious Disease
Nephrology
Podiatry
Cardiology
Cardiology
Infectious Disease
Internal Medicine
Endocrinology
Internal Medicine
Internal Medicine
Podiatry
Internal Medicine

## 2022-05-16 NOTE — DISCHARGE NOTE PROVIDER - NSDCCPGOAL_GEN_ALL_CORE_FT
Pt has been having temp since last night, 99.8 today,  Nasal congestion with clear to yellow discharge, and cough began yesterday, and she began coughing with up bright red blood produced this am when coughing.  Pt  has been exposed to multiple people at work who are sick. Feels weak and fatigued.  She has only taken cough drops, no other medications.    Reason for Disposition   Coughed up > 1 tablespoon (15 ml) blood (Exception: blood-tinged sputum)    Protocols used: ST COUGH-A-OH      
To get better and follow your care plan as instructed.

## 2022-05-16 NOTE — PROGRESS NOTE ADULT - REASON FOR ADMISSION
R 5th toe infection

## 2022-05-16 NOTE — DISCHARGE NOTE PROVIDER - PROVIDER TOKENS
PROVIDER:[TOKEN:[[id number]],FOLLOWUP:[1 week],ESTABLISHEDPATIENT:[T]],PROVIDER:[TOKEN:[[id number]],FOLLOWUP:[1-3 days],ESTABLISHEDPATIENT:[T]]

## 2022-05-16 NOTE — PROGRESS NOTE ADULT - SUBJECTIVE AND OBJECTIVE BOX
56y year old Male seen at bedside for Pt s/p RIGHT foot 5th metatarsal head and base resection, (DOS: 5/11/2022). Pt doing well. Pt denies any pain to RLE at this time. Pt tolerating diet, voiding without any difficulties. Pt complaining of fever. Denies any chills, nausea, vomiting, chest pain, shortness of breath, or calf pain at this time.       Allergies    No Known Allergies    Intolerances    MEDICATIONS  (STANDING):  aspirin enteric coated 162 milliGRAM(s) Oral daily  atorvastatin 40 milliGRAM(s) Oral at bedtime  azaTHIOprine 100 milliGRAM(s) Oral daily  cloNIDine 0.1 milliGRAM(s) Oral two times a day  dextrose 5%. 1000 milliLiter(s) (50 mL/Hr) IV Continuous <Continuous>  dextrose 50% Injectable 25 Gram(s) IV Push once  enoxaparin Injectable 40 milliGRAM(s) SubCutaneous every 24 hours  gabapentin 300 milliGRAM(s) Oral two times a day  glucagon  Injectable 1 milliGRAM(s) IntraMuscular once  hydrALAZINE 25 milliGRAM(s) Oral three times a day  insulin lispro (ADMELOG) Pump 1 Each SubCutaneous Continuous Pump  losartan 25 milliGRAM(s) Oral daily  metoprolol tartrate 75 milliGRAM(s) Oral every 12 hours  piperacillin/tazobactam IVPB.. 3.375 Gram(s) IV Intermittent every 8 hours  tacrolimus 1 milliGRAM(s) Oral <User Schedule>  tacrolimus 0.5 milliGRAM(s) Oral <User Schedule>  trimethoprim   80 mG/sulfamethoxazole 400 mG 1 Tablet(s) Oral daily    MEDICATIONS  (PRN):  acetaminophen     Tablet .. 650 milliGRAM(s) Oral every 6 hours PRN Temp greater or equal to 38C (100.4F), Mild Pain (1 - 3)  aluminum hydroxide/magnesium hydroxide/simethicone Suspension 30 milliLiter(s) Oral every 4 hours PRN Dyspepsia  dextrose Oral Gel 15 Gram(s) Oral once PRN Blood Glucose LESS THAN 70 milliGRAM(s)/deciliter  melatonin 3 milliGRAM(s) Oral at bedtime PRN Insomnia  meds    ICU Vital Signs Last 24 Hrs  T(C): 36.6 (16 May 2022 03:59), Max: 37.1 (15 May 2022 21:00)  T(F): 97.8 (16 May 2022 03:59), Max: 98.7 (15 May 2022 21:00)  HR: 55 (16 May 2022 03:59) (55 - 63)  BP: 142/67 (16 May 2022 03:59) (134/66 - 155/78)  BP(mean): --  ABP: --  ABP(mean): --  RR: 18 (16 May 2022 03:59) (18 - 20)  SpO2: 98% (16 May 2022 03:59) (94% - 98%)    PHYSICAL EXAM:  Vascular:  DP palpable bilaterally; PT palpable bilaterally, Capillary refill (CFT) 3 seconds. Digital hair absent bilaterally.   Neurological: Light touch sensation diminished bilaterally.  Musculoskeletal:  Left 4th toe s/p amputation and distal phalanx amputation of the 2 & 3rd. Right 2nd toe amputation. 4/5 strength in all quadrants bilaterally, AJ & STJ ROM absent b/l.   Dermatological: Surgical incisions noted to the lateral aspect of right foot. Sutures clean, dry, and intact. No signs of wound dehiscence. Right lateral 5th metatarsal head 1 x 1 x 0.2 cm and 5th metatarsal base 4 x 4 x 0.3 cm ulcers. All wound beds granular, with no periwound erythema. No purulence, no undermining, probes to bone.        ACC: [id number] EXAM: XR FOOT COMP MIN 3 VIEWS RT    PROCEDURE DATE: 05/11/2022        INTERPRETATION: RIGHT foot    CLINICAL INFORMATION: Postoperative RIGHT foot radiographs TECHNIQUE: AP,lateral and oblique views.    FINDINGS:  Status post resection of the head of the fifth metatarsal bone. RIGHT fifth phalanges in situ. Remaining osseous and joint structures intact.    IMPRESSION:    Postop changes as described    --- End of Report ---

## 2022-05-16 NOTE — DISCHARGE NOTE NURSING/CASE MANAGEMENT/SOCIAL WORK - PATIENT PORTAL LINK FT
You can access the FollowMyHealth Patient Portal offered by [facility] by registering at the following website: [email or web address]. By joining [email or web address]’s FollowMyHealth portal, you will also be able to view your health information using other applications (apps) compatible with our system. Distance Of Undermining In Cm (Required): 0.9

## 2022-05-16 NOTE — PROGRESS NOTE ADULT - SUBJECTIVE AND OBJECTIVE BOX
[facility], Division of Infectious Diseases  MEL Mckoy Y. Patel, S. Shah, G. Lawrence  [phone]  after hours and weekends [phone]    Name: LUTHER NORIEGA  Age: 56y  Gender: Male  MRN: [id number]    Interval History--  Notes reviewed  no new events  waiting for path    Allergies    No Known Allergies    Intolerances        Medications--  Antibiotics:  piperacillin/tazobactam IVPB.. 3.375 Gram(s) IV Intermittent every 8 hours  trimethoprim   80 mG/sulfamethoxazole 400 mG 1 Tablet(s) Oral daily    Immunologic:  azaTHIOprine 100 milliGRAM(s) Oral daily  tacrolimus 0.5 milliGRAM(s) Oral <User Schedule>  tacrolimus 1 milliGRAM(s) Oral <User Schedule>    Other:  acetaminophen     Tablet .. PRN  aluminum hydroxide/magnesium hydroxide/simethicone Suspension PRN  aspirin enteric coated  atorvastatin  cloNIDine  dextrose 5%.  dextrose 50% Injectable  dextrose Oral Gel PRN  enoxaparin Injectable  gabapentin  glucagon  Injectable  hydrALAZINE  insulin lispro (ADMELOG) Pump  losartan  melatonin PRN  metoprolol tartrate      Review of Systems--  A 10-point review of systems was obtained.     Pertinent positives and negatives--  Constitutional: No fevers. No Chills. No Rigors.   Cardiovascular: No chest pain. No palpitations.  Respiratory: No shortness of breath. No cough.  Gastrointestinal: No nausea or vomiting. No diarrhea or constipation.   Psychiatric: Pleasant. Appropriate affect.    Review of systems otherwise negative except as previously noted.    Physical Examination--  Vital Signs: T(F): 97.8 (05-16-22 @ 03:59), Max: 98.7 (05-15-22 @ 21:00)  HR: 55 (05-16-22 @ 03:59)  BP: 142/67 (05-16-22 @ 03:59)  RR: 18 (05-16-22 @ 03:59)  SpO2: 98% (05-16-22 @ 03:59)  Wt(kg): --  General: Nontoxic-appearing Male in no acute distress.  HEENT: AT/NC. PERRL. EOMI. Anicteric. Conjunctiva pink and moist. Oropharynx clear. Dentition fair.  Neck: Not rigid. No sense of mass.  Nodes: None palpable.  Lungs: Clear bilaterally without rales, wheezing or rhonchi  Heart: Regular rate and rhythm. No Murmur. No rub. No gallop. No palpable thrill.  Abdomen: Bowel sounds present and normoactive. Soft. Nondistended. Nontender. No sense of mass. No organomegaly.  Back: No spinal tenderness. No costovertebral angle tenderness.   Extremities: No cyanosis or clubbing. No edema. right foot wrapped   Skin: Warm. Dry. Good turgor. No rash. No vasculitic stigmata.  Psychiatric: Appropriate affect and mood for situation.         Laboratory Studies--  CBC      Chemistries  05-16    142  |  102  |  19  ----------------------------<  142<H>  4.4   |  35<H>  |  1.10    Ca    9.7      16 May 2022 08:01        Culture Data    Culture - Tissue with Gram Stain (collected 11 May 2022 23:50)  Source: .Tissue Other, right 5th metatarsal base bone  Gram Stain (12 May 2022 03:40):    No polymorphonuclear cells seen per low power field    No organisms seen per oil power field  Preliminary Report (12 May 2022 21:38):    No growth    Culture - Tissue with Gram Stain (collected 11 May 2022 23:50)  Source: .Tissue Other, right 5th metatarsal head bone  Gram Stain (12 May 2022 03:40):    No polymorphonuclear cells seen per low power field    No organisms seen per oil power field  Preliminary Report (12 May 2022 21:37):    No growth    Culture - Urine (collected 11 May 2022 00:44)  Source: Clean Catch Clean Catch (Midstream)  Final Report (11 May 2022 20:41):    No growth    Culture - Blood (collected 10 May 2022 18:50)  Source: .Blood Blood-Peripheral  Final Report (15 May 2022 19:00):    No Growth Final    Culture - Blood (collected 10 May 2022 18:50)  Source: .Blood Blood-Peripheral  Final Report (15 May 2022 19:00):    No Growth Final             [facility], Division of Infectious Diseases  MEL Mckoy Y. Patel, S. Shah, G. Lawrence  [phone]  after hours and weekends [phone]    Name: LUTHER NORIEGA  Age: 56y  Gender: Male  MRN: [id number]    Interval History--  Notes reviewed  no new events  waiting for path    Allergies    No Known Allergies    Intolerances        Medications--  Antibiotics:  piperacillin/tazobactam IVPB.. 3.375 Gram(s) IV Intermittent every 8 hours  trimethoprim   80 mG/sulfamethoxazole 400 mG 1 Tablet(s) Oral daily    Immunologic:  azaTHIOprine 100 milliGRAM(s) Oral daily  tacrolimus 0.5 milliGRAM(s) Oral <User Schedule>  tacrolimus 1 milliGRAM(s) Oral <User Schedule>    Other:  acetaminophen     Tablet .. PRN  aluminum hydroxide/magnesium hydroxide/simethicone Suspension PRN  aspirin enteric coated  atorvastatin  cloNIDine  dextrose 5%.  dextrose 50% Injectable  dextrose Oral Gel PRN  enoxaparin Injectable  gabapentin  glucagon  Injectable  hydrALAZINE  insulin lispro (ADMELOG) Pump  losartan  melatonin PRN  metoprolol tartrate      Review of Systems--  A 10-point review of systems was obtained.     Pertinent positives and negatives--  Constitutional: No fevers. No Chills. No Rigors.   Cardiovascular: No chest pain. No palpitations.  Respiratory: No shortness of breath. No cough.  Gastrointestinal: No nausea or vomiting. No diarrhea or constipation.   Psychiatric: Pleasant. Appropriate affect.    Review of systems otherwise negative except as previously noted.    Physical Examination--  Vital Signs: T(F): 97.8 (05-16-22 @ 03:59), Max: 98.7 (05-15-22 @ 21:00)  HR: 55 (05-16-22 @ 03:59)  BP: 142/67 (05-16-22 @ 03:59)  RR: 18 (05-16-22 @ 03:59)  SpO2: 98% (05-16-22 @ 03:59)  Wt(kg): --  General: Nontoxic-appearing Male in no acute distress.  HEENT: AT/NC. PERRL. EOMI. Anicteric. Conjunctiva pink and moist. Oropharynx clear. Dentition fair.  Neck: Not rigid. No sense of mass.  Nodes: None palpable.  Lungs: Clear bilaterally without rales, wheezing or rhonchi  Heart: Regular rate and rhythm. No Murmur. No rub. No gallop. No palpable thrill.  Abdomen: Bowel sounds present and normoactive. Soft. Nondistended. Nontender. No sense of mass. No organomegaly.  Back: No spinal tenderness. No costovertebral angle tenderness.   Extremities: No cyanosis or clubbing. No edema. right foot wrapped   Skin: Warm. Dry. Good turgor. No rash. No vasculitic stigmata.  Psychiatric: Appropriate affect and mood for situation.         Laboratory Studies--  CBC      Chemistries  05-16    142  |  102  |  19  ----------------------------<  142<H>  4.4   |  35<H>  |  1.10    Ca    9.7      16 May 2022 08:01        Culture Data    Culture - Tissue with Gram Stain (collected 11 May 2022 23:50)  Source: .Tissue Other, right 5th metatarsal base bone  Gram Stain (12 May 2022 03:40):    No polymorphonuclear cells seen per low power field    No organisms seen per oil power field  Preliminary Report (12 May 2022 21:38):    No growth    Culture - Tissue with Gram Stain (collected 11 May 2022 23:50)  Source: .Tissue Other, right 5th metatarsal head bone  Gram Stain (12 May 2022 03:40):    No polymorphonuclear cells seen per low power field    No organisms seen per oil power field  Preliminary Report (12 May 2022 21:37):    No growth    Culture - Urine (collected 11 May 2022 00:44)  Source: Clean Catch Clean Catch (Midstream)  Final Report (11 May 2022 20:41):    No growth    Culture - Blood (collected 10 May 2022 18:50)  Source: .Blood Blood-Peripheral  Final Report (15 May 2022 19:00):    No Growth Final    Culture - Blood (collected 10 May 2022 18:50)  Source: .Blood Blood-Peripheral  Final Report (15 May 2022 19:00):    No Growth Final      Specimen(s) Submitted   1 Right 5th metatarsal head   2 Right 5th metatarsal, clean margin   3 Right 5th metatarsal bone   Final Diagnosis   1. Right fifth metatarsal head, amputation:   - Bone with cartilage showing focal mild chronic osteomyelitis.   2. Right fifth metatarsal, clean margin:   - Bone with cartilage and soft tissue showing reactive changes. No   definitive evidence of osteomyelitis is identified.   3. Right fifth metatarsal base, amputation:   - Bone with cartilage and soft tissue showing focal mild chronic   osteomyelitis.

## 2022-05-16 NOTE — DISCHARGE NOTE PROVIDER - NSDCCPCAREPLAN_GEN_ALL_CORE_FT
PRINCIPAL DISCHARGE DIAGNOSIS  Diagnosis: Diabetic foot infection  Assessment and Plan of Treatment: Follow-up with wound care at the end of this week  Follow-up with your primary care doctor within 1 week.

## 2022-05-16 NOTE — PROGRESS NOTE ADULT - PROBLEM SELECTOR PLAN 1
S/P Resection of head of fifth metatarsal bone -- POD #2  Continue post-op care  Pan-cultured -- NTD  F/U intra-op cytopathology  Low grade temp  Continue iv zosyn  Podiatry/ID/Cardio f/u  Further work-up/management pending clinical course.
S/P Resection of head of fifth metatarsal bone -- POD #5  Continue post-op care  Pan-cultured -- NTD  F/U intra-op cytopathology -- due today  Continue iv zosyn  Podiatry/ID/Cardio f/u  Further work-up/management pending clinical course.
S/P Resection of head of fifth metatarsal bone -- POD #3  Continue post-op care  Pan-cultured -- NTD  F/U intra-op cytopathology  Low grade temp  Continue iv zosyn  Podiatry/ID/Cardio f/u  Further work-up/management pending clinical course.
cont current insulin pump settings  cont cons cho diet  goal bg 100-180 in hosp setting
cont current insulin pump settings  cont cons cho diet  goal bg 100-180 in hosp setting
s/p RIGHT foot 5th metatarsal head and base resection, POD2 (DOS: 5/11/2022)  - Pt doing well. Surgical site stable. No signs of infection  - f/u OR cultures & pathology results  - Continue med management  - Per ID recs, continue Zosyn & Bactrim  - Nephology recs appreciated  - No further podiatric intervention at this time. Pt is stable from podiatry standpoint.     WOUND CARE INSTRUCTIONS  1. Please leave dressing clean, dry and intact until follow-up. Monitor for any signs of infection and present to the ED if they arise.  2. If the dressing gets wet, please change with dry, sterile dressing.  3. Patient is to be Partial weight bearing to the Right lower extremity.  4. Patient is to follow up [facility] with Dr. Araiza or Dr. Catherine within 3-5 days upon discharge. Please call [phone] as soon as discharged and state that it is for a "post-op appointment".
s/p RIGHT foot 5th metatarsal head and base resection  - Pt doing well.  Surgical site stable. No signs of infection  - f/u OR cultures & pathology results  - Continue med management  - Per ID recs, continue Zosyn & Bactrim  - Nephology recs appreciated    Podiatry to follow
s/p RIGHT foot 5th metatarsal head and base resection (DOS: 5/11/2022)  - Pt doing well. Surgical site stable. No signs of infection  - OR cultures: no growth  - OR pathology results pending  - Continue med management  - Per ID recs, continue Zosyn & Bactrim  - Nephology recs appreciated  - Cardiology recs appreciated  - No further podiatric intervention at this time. Pt is stable from podiatry standpoint.     WOUND CARE INSTRUCTIONS  1. Please leave dressing clean, dry and intact until follow-up. Monitor for any signs of infection and present to the ED if they arise.  2. If the dressing gets wet, please change with dry, sterile dressing.  3. Patient is to be Partial weight bearing to the Right lower extremity.  4. Patient is to follow up in the [facility] with Dr. Araiza or Dr. Catherine within 3-5 days upon discharge. Please call [phone] as soon as discharged and state that it is for a "post-op appointment".
S/P Resection of head of fifth metatarsal bone -- POD #1  Continue post-op care  Pan-cultured -- NTD  F/U intra-op cytopathology  Continue iv zosyn  Podiatry/ID/Cardio f/u  Further work-up/management pending clinical course.
S/P Resection of head of fifth metatarsal bone -- POD #4  Continue post-op care  Pan-cultured -- NTD  F/U intra-op cytopathology  Continue iv zosyn  Podiatry/ID/Cardio f/u  Further work-up/management pending clinical course.
Pan-cultured  Patient says he cannot get MRI  Continue iv zosyn  MAY PROCEED TO O.R. FOR PLANNED SURGERY  Podiatry/ID/Cardio f/u  Further work-up/management pending clinical course.

## 2022-05-16 NOTE — PROGRESS NOTE ADULT - PROBLEM SELECTOR PLAN 4
Continue current medications   Renal consult noted  Monitor BMP
Continue current medications   Renal consult  Monitor BMP
Continue current medications   Renal consult noted  Monitor BMP

## 2022-05-16 NOTE — DISCHARGE NOTE NURSING/CASE MANAGEMENT/SOCIAL WORK - NSDCVIVACCINE_GEN_ALL_CORE_FT
influenza, injectable, quadrivalent, preservative free; 27-Nov-2015 13:32; Jv Zhu (RN); [employer]; [id number]; IntraMuscular; Deltoid Right.; 0.5 milliLiter(s); VIS (VIS Published: 07-Aug-2015, VIS Presented: 27-Nov-2015);   influenza, injectable, quadrivalent, preservative free; 22-Sep-2021 13:06; Eugene Mccarthy (RN); [employer]; [id number] (Exp. Date: 30-Jun-2022); IntraMuscular; Deltoid Left.; 0.5 milliLiter(s); VIS (VIS Published: 15-Aug-2019, VIS Presented: 22-Sep-2021);

## 2022-05-16 NOTE — PROGRESS NOTE ADULT - ASSESSMENT
55 y/o M with HTN, HLD, T1DM (on Insulin pump), CKD s/p renal transplant, CVA with residual, DVT s/p Eliquis, CARMEN (on nocturnal CPAP), Restrictive Lung Disease, squamous cell carcinoma OM Left 4th toe s/p amputation, and right 2nd toe amputation presented to the ED for right 5th toe ulcer.  Planned for right foot 5th metatarsal head resection & 5th metatarsal base exostectomy in 5/11.     HTN   - BP on the high side, likely, with some reactive component.  Continue Losartan, Clonidine, Hydralazine, and Metoprolol   - No evidence of volume overload.  No anginal complaints  - TTE 07/2017 showed conc LVH, normal LV size and function    Right toe ulcer  - S/p Resection of head of fifth metatarsal bone 5/11/22  - Tolerated well without cardiac complications   - Pain control    - Monitor and replete lytes, keep K>4, Mg>2.    Will continue to follow    Miracle Fowler DNP, NP=C  Cardiology   Spectra #[id number]     57 y/o M with HTN, HLD, T1DM (on Insulin pump), CKD s/p renal transplant, CVA with residual, DVT s/p Eliquis, CARMEN (on nocturnal CPAP), Restrictive Lung Disease, squamous cell carcinoma OM Left 4th toe s/p amputation, and right 2nd toe amputation presented to the ED for right 5th toe ulcer.  Planned for right foot 5th metatarsal head resection & 5th metatarsal base exostectomy in 5/11.     HTN   - BP on the high side, likely, with some reactive component.  Continue Losartan, Clonidine, Hydralazine, and Metoprolol   - can increase losartan as needed  - No evidence of volume overload.  No anginal complaints  - TTE 07/2017 showed conc LVH, normal LV size and function    Right toe ulcer  - S/p Resection of head of fifth metatarsal bone 5/11/22  - Tolerated well without cardiac complications   - Pain control    - Monitor and replete lytes, keep K>4, Mg>2.    Will continue to follow    Miracle Fowler DNP, NP=C  Cardiology   Spectra #[id number]

## 2022-05-16 NOTE — DISCHARGE NOTE PROVIDER - NSDCFUADDINST_GEN_ALL_CORE_FT
WOUND CARE INSTRUCTIONS  1. Please leave dressing clean, dry and intact until follow-up. Monitor for any signs of infection and present to the ED if they arise.  2. If the dressing gets wet, please change with dry, sterile dressing.  3. Patient is to be Partial weight bearing to the Right lower extremity.  4. Patient is to follow up [facility] with Dr. Araiza or Dr. Catherine within 3-5 days upon discharge. Please call [phone] as soon as discharged and state that it is for a "post-op appointment".

## 2022-05-16 NOTE — PROGRESS NOTE ADULT - NS ATTEND AMEND GEN_ALL_CORE FT
Chart review    Patient seen and examined    Agree with plan as outlined    55 y/o M with HTN, HLD, T1DM (on Insulin pump), CKD s/p renal transplant, CVA with residual, DVT s/p Eliquis, CARMEN (on nocturnal CPAP), Restrictive Lung Disease, squamous cell carcinoma OM Left 4th toe s/p amputation, and right 2nd toe amputation presented to the ED for right 5th toe ulcer.  Planned for right foot 5th metatarsal head resection & 5th metatarsal base exostectomy in 5/11.     HTN, HLD, Rt foot ulcer   - Continue losartan, clonidine, hydralazine and metoprolol , bp 123/68  - Continue Lipitor and asa  - TTE 07/2017 showed conc LVH, normal LV size and function  - No evidence of any meaningful volume overload   - S/p Resection of head of fifth metatarsal bone, Resection of metatarsal bone 5/11/22  - Tolerated well without cardiac complications   -fu pathology
BP control. No signs of significant ischemia or volume overload. cont current care. Further cardiac workup will depend on clinical course.
Optimized for the OR from a cardiac point of view with no evidence of active ischemic heart disease, decompensated heart failure, severe obstructive valvular disease, or uncontrolled arrhythmia.
No signs of significant ischemia or volume overload. cont current care. Further cardiac workup will depend on clinical course.

## 2022-05-16 NOTE — PROGRESS NOTE ADULT - SUBJECTIVE AND OBJECTIVE BOX
Patient is a 56y old  Male who presents with a chief complaint of R 5th toe infection (14 May 2022 10:03)      INTERVAL HPI/OVERNIGHT EVENTS: Patient seen and examined. NAD. No complaints.    Vital Signs Last 24 Hrs  T(C): 36.6 (16 May 2022 03:59), Max: 37.1 (15 May 2022 21:00)  T(F): 97.8 (16 May 2022 03:59), Max: 98.7 (15 May 2022 21:00)  HR: 55 (16 May 2022 03:59) (55 - 63)  BP: 142/67 (16 May 2022 03:59) (134/66 - 155/78)  BP(mean): --  RR: 18 (16 May 2022 03:59) (18 - 20)  SpO2: 98% (16 May 2022 03:59) (94% - 98%)    05-16    142  |  102  |  19  ----------------------------<  142<H>  4.4   |  35<H>  |  1.10    Ca    9.7      16 May 2022 08:01          CAPILLARY BLOOD GLUCOSE      POCT Blood Glucose.: 175 mg/dL (16 May 2022 11:33)  POCT Blood Glucose.: 147 mg/dL (16 May 2022 07:22)  POCT Blood Glucose.: 267 mg/dL (15 May 2022 21:26)  POCT Blood Glucose.: 191 mg/dL (15 May 2022 16:40)              acetaminophen     Tablet .. 650 milliGRAM(s) Oral every 6 hours PRN  aluminum hydroxide/magnesium hydroxide/simethicone Suspension 30 milliLiter(s) Oral every 4 hours PRN  aspirin enteric coated 162 milliGRAM(s) Oral daily  atorvastatin 40 milliGRAM(s) Oral at bedtime  azaTHIOprine 100 milliGRAM(s) Oral daily  cloNIDine 0.1 milliGRAM(s) Oral two times a day  dextrose 5%. 1000 milliLiter(s) IV Continuous <Continuous>  dextrose 50% Injectable 25 Gram(s) IV Push once  dextrose Oral Gel 15 Gram(s) Oral once PRN  enoxaparin Injectable 40 milliGRAM(s) SubCutaneous every 24 hours  gabapentin 300 milliGRAM(s) Oral two times a day  glucagon  Injectable 1 milliGRAM(s) IntraMuscular once  hydrALAZINE 25 milliGRAM(s) Oral three times a day  insulin lispro (ADMELOG) Pump 1 Each SubCutaneous Continuous Pump  losartan 25 milliGRAM(s) Oral daily  melatonin 3 milliGRAM(s) Oral at bedtime PRN  metoprolol tartrate 75 milliGRAM(s) Oral every 12 hours  piperacillin/tazobactam IVPB.. 3.375 Gram(s) IV Intermittent every 8 hours  tacrolimus 0.5 milliGRAM(s) Oral <User Schedule>  tacrolimus 1 milliGRAM(s) Oral <User Schedule>  trimethoprim   80 mG/sulfamethoxazole 400 mG 1 Tablet(s) Oral daily              REVIEW OF SYSTEMS:  CONSTITUTIONAL: No fever, no weight loss, or no fatigue  NECK: No pain, no stiffness  RESPIRATORY: No cough, no wheezing, no chills, no hemoptysis, No shortness of breath  CARDIOVASCULAR: No chest pain, no palpitations, no dizziness, no leg swelling  GASTROINTESTINAL: No abdominal pain. No nausea, no vomiting, no hematemesis; No diarrhea, no constipation. No melena, no hematochezia.  GENITOURINARY: No dysuria, no frequency, no hematuria, no incontinence  NEUROLOGICAL: No headaches, no loss of strength, no numbness, no tremors  SKIN: No itching, no burning  MUSCULOSKELETAL: No joint pain, no swelling; No muscle, no back, no extremity pain  PSYCHIATRIC: No depression, no mood swings,   HEME/LYMPH: No easy bruising, no bleeding gums  ALLERY AND IMMUNOLOGIC: No hives       Consultant(s) Notes Reviewed:  [X] YES  [ ] NO    PHYSICAL EXAM:  GENERAL: NAD  HEAD:  Atraumatic, Normocephalic  EYES: EOMI, PERRLA, conjunctiva and sclera clear  ENMT: No tonsillar erythema, exudates, or enlargement; Moist mucous membranes  NECK: Supple, No JVD  NERVOUS SYSTEM:  Awake & alert  CHEST/LUNG: Clear to auscultation bilaterally; No rales, rhonchi, wheezing,  HEART: Regular rate and rhythm  ABDOMEN: Soft, Nontender, Nondistended; Bowel sounds present  EXTREMITIES:  No clubbing, cyanosis, or edema; RLE bandaged  LYMPH: No lymphadenopathy noted  SKIN: No rashes      Advanced care planning discussed with patient/family [X] YES   [ ] NO    Advanced care planning discussed with patient/family. Patient's health status was discussed. All appropriate changes have been made regarding patient's end-of-life care. Advanced care planning forms reviewed/discussed/completed.  20 minutes spent.

## 2022-05-16 NOTE — PROGRESS NOTE ADULT - PROBLEM SELECTOR PLAN 3
RISS plus insulin pump  Endocrine consult noted

## 2022-05-16 NOTE — DISCHARGE NOTE PROVIDER - NSDCMRMEDTOKEN_GEN_ALL_CORE_FT
aspirin 81 mg oral tablet: 2 tab(s) orally once a day  azaTHIOprine 50 mg oral tablet: 2 tab(s) orally once a day  Bactrim 400 mg-80 mg oral tablet: 1 tab(s) orally once a day  cloNIDine 0.1 mg oral tablet: 1 tab(s) orally 2 times a day  gabapentin 300 mg oral capsule: 1 cap(s) orally 2 times a day  HumaLOG 100 units/mL injectable solution: inject  units per day per insulin pump    3units/hr via pump  hydrALAZINE 25 mg oral tablet: 1 tab(s) orally 3 times a day  losartan 25 mg oral tablet: 1 tab(s) orally once a day  lovastatin 40 mg oral tablet: 1 tab(s) orally once a day  Metoprolol Tartrate 25 mg oral tablet: 3 tab(s) orally 2 times a day      ROLLING WALKER:   tacrolimus 0.5 mg oral capsule: 1 cap(s) orally every 12 hours (9:30am and 9:30pm)    *takes along with 1mg for a TDD: 1.5mg *  tacrolimus 1 mg oral capsule: 1 cap(s) orally every 12 hours at (9am and 9pm)      takes along with 0.5mg for a TDD: 1.5mg

## 2022-05-16 NOTE — PROGRESS NOTE ADULT - PROBLEM SELECTOR PROBLEM 1
Diabetes mellitus
Diabetic foot ulcer with osteomyelitis
Diabetes mellitus
Diabetic foot ulcer with osteomyelitis

## 2022-05-16 NOTE — PROGRESS NOTE ADULT - ASSESSMENT
This is a 57 y/o M with HTN, HLD, T1DM (on Insulin pump), CKD s/p renal transplant, CVA with residual, DVT s/p Eliquis, CARMEN (on nocturnal CPAP), Restrictive Lung Disease, squamous cell carcinoma OM Left 4th toe s/p amputation, and right 2nd toe amputation presented to the ED for right 5th toe ulcer.    Resection of head of fifth metatarsal bone 5/11  Resection of metatarsal bone 5/11    R 5th toe DFU/OM    with prior enterococcus agree with C/w zosyn  Appreciate podiatry recs  sp OR 5/11  tissue cx -- neg   recs to follow based on path and micro but if clean margin will dc off abx    Hx of renal txp  -c/w bactrim for PJP PPx       This is a 55 y/o M with HTN, HLD, T1DM (on Insulin pump), CKD s/p renal transplant, CVA with residual, DVT s/p Eliquis, CARMEN (on nocturnal CPAP), Restrictive Lung Disease, squamous cell carcinoma OM Left 4th toe s/p amputation, and right 2nd toe amputation presented to the ED for right 5th toe ulcer.    Resection of head of fifth metatarsal bone 5/11  Resection of metatarsal bone 5/11    R 5th toe DFU/OM  sp OR 5/11  tissue cx -- neg   path with clean margin -- no sign of osteomyelitis   can stop antibx and cont local wound care     Hx of renal txp  -c/w bactrim for PJP PPx      d/w Dr Gonsales

## 2022-05-16 NOTE — DISCHARGE NOTE NURSING/CASE MANAGEMENT/SOCIAL WORK - NSTRANSFERBELONGINGSRESP_GEN_A_NUR
[facility]  Hospitalist Progress Note    Assessment & Plan   Kay Dunham is a 64 year old female with longstanding alcohol abuse who was admitted on 9/26/2019 with alcohol withdrawal.     Alcohol abuse with acute alcohol withdrawal  Drinking 1.5 pints whiskey daily, last drink morning of 9/26 with BAL of 0.17 at admission.  Presents with mild tachycardia and tremor, reporting symptoms of withdrawal.  Wishes to re-enter treatment program.  - Psych and CD consulted  - CIWA protocol  - oral thiamine, folate, MVI    Possible chorea  - neuro consult -- not thought to be significant  - hold PTA Seroquel for now  - check B12, TSH, PTH, magnesium     Elevated alkaline phos  Alk phos 153 at admission, bili/AST/ALT wnl.    - repeat normal at 130     Depression  - hold seroquel as above  - Psych consulted -- Paxil at bedtime, stop Seroquel and start Trazodone 75 mg at bedtime.     Orders Placed This Encounter      Combination Diet Regular Diet Adult    DVT prophylaxis: Pneumatic Compression Devices and Ambulate every shift  Code Status: Full Code  Disposition: Expected discharge to inpatient CD treatment tomorrow.     Neda Lau MD  [phone] (7am - 6pm)  Text Page  ~~~~~~~~~~~~~~~~~~~~~~~~~~~~~~~~~~~~~~~~~~~~~~~  Interval History   In good spirits. Hopeful for treatment soon. Eating and drinking ok.     -Data reviewed today: I reviewed all new labs and imaging results over the last 24 hours.    Physical Exam   Temp: 98.3  F (36.8  C) Temp src: Oral BP: 115/60 Pulse: 73 Heart Rate: 67 Resp: 18 SpO2: 92 % O2 Device: None (Room air)    Vitals:    09/26/19 1353   Weight: 86.6 kg (191 lb)     Vital Signs with Ranges  Temp:  [97.4  F (36.3  C)-98.3  F (36.8  C)] 98.3  F (36.8  C)  Pulse:  [73-77] 73  Heart Rate:  [67-73] 67  Resp:  [18] 18  BP: (111-117)/(60-73) 115/60  SpO2:  [92 %-94 %] 92 %  I/O last 3 completed shifts:  In: 240 [P.O.:240]  Out: -     Constitutional: Alert, NAD, pleasant and  interactive  HEENT: mmm, sclerae anicteric  Respiratory: Lungs CTAB, no wheezes or crackles  Cardiovascular: RRR, no murmurs  no LE edema  GI: soft, non-tender, nondistended  Skin/Integument: warm, dry, no acute rashes  Musc: URIOSTEGUI, normal limb strength x 4    Medications     - MEDICATION INSTRUCTIONS -         folic acid  1 mg Oral Daily     gabapentin  900 mg Oral TID     influenza vaccine adult (product based on age)  0.5 mL Intramuscular Prior to discharge     levothyroxine  75 mcg Oral QAM AC     lidocaine  1 patch Transdermal Q24H     lidocaine   Transdermal Q8H     lidocaine   Transdermal Q24h     multivitamin w/minerals  1 tablet Oral Daily     PARoxetine  40 mg Oral At Bedtime     propranolol  10 mg Oral TID     rivaroxaban ANTICOAGULANT  20 mg Oral Daily with supper     sodium chloride (PF)  3 mL Intracatheter Q8H     traZODone  75 mg Oral At Bedtime       Data   Recent Labs   Lab 09/28/19  0917 09/27/19  0813 09/26/19  1449   WBC  --  4.7 5.3   HGB  --  10.5* 11.5*   MCV  --  84 84   PLT  --  237 289   NA  --  140 143   POTASSIUM  --  4.1 3.9   CHLORIDE  --  108 113*   CO2  --  28 28   BUN  --  12 14   CR 0.78 0.81 0.78   ANIONGAP  --  4 2*   MART  --  8.3* 8.5   GLC  --  112* 108*   ALBUMIN  --  2.9* 3.6   PROTTOTAL  --  6.3* 7.2   BILITOTAL  --  0.2 0.1*   ALKPHOS  --  130 153*   ALT  --  21 24   AST  --  19 27       Imaging:  No results found for this or any previous visit (from the past 24 hour(s)).     yes

## 2022-05-16 NOTE — DISCHARGE NOTE PROVIDER - HOSPITAL COURSE
This is a 55 y/o M with HTN, HLD, T1DM (on Insulin pump), CKD s/p renal transplant, CVA with residual, DVT s/p Eliquis, CARMEN (on nocturnal CPAP), Restrictive Lung Disease, squamous cell carcinoma OM Left 4th toe s/p amputation, and right 2nd toe amputation presented to the ED for right 5th toe ulcer.  Planned for right foot 5th metatarsal head resection & 5th metatarsal base exostectomy in am, 5/11.  S/P Resection of head of fifth metatarsal bone -- POD #5  Pan-cultured -- NTD  Intra-op cytopathology --     >35 minutes spent on discharge   This is a 55 y/o M with HTN, HLD, T1DM (on Insulin pump), CKD s/p renal transplant, CVA with residual, DVT s/p Eliquis, CARMEN (on nocturnal CPAP), Restrictive Lung Disease, squamous cell carcinoma OM Left 4th toe s/p amputation, and right 2nd toe amputation presented to the ED for right 5th toe ulcer.  Planned for right foot 5th metatarsal head resection & 5th metatarsal base exostectomy in am, 5/11.  S/P Resection of head of fifth metatarsal bone -- POD #5  Pan-cultured -- NTD  Intra-op cytopathology -- clean margins, no osteo  Cleared by ID for discharge home without abx    >35 minutes spent on discharge

## 2022-05-16 NOTE — PROGRESS NOTE ADULT - SUBJECTIVE AND OBJECTIVE BOX
[facility] -- Julisa Jansen, Luly Larson, Karan Kelly Savella, [place]  Office # [phone]    Follow Up: HTN, Cardiac Optimization     Subjective/Observations: Awake and alert, comfortable on RA.  Denies postop pain.  Denies any form of respiratory or cardiac discomfort.    REVIEW OF SYSTEMS: All other review of systems is negative unless indicated above  PAST MEDICAL & SURGICAL HISTORY:  Hypertension  Hyperlipidemia  Diabetes mellitus  Type 1 on insulin pump  CKD (chronic kidney disease)  Renal Transplant 2013 at [facility]  Diabetic peripheral neuropathy  Obesity (BMI 30-39.9)    CVA (cerebral vascular accident)  [place] Stroke  low L body sensation  low R face sensation  decreased peripheral vision  poor balance    Squamous cell carcinoma of skin of left ear    History of DVT (deep vein thrombosis)  s/p eliquis    Recurrent squamous cell carcinoma of skin  nose, L arm    Toe infection  2007 L 4th Toe surgery-amputation secondary to osteomyelitis    Ear disease  Left inner ear surgery, 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 1000 milliLiter(s) (50 mL/Hr) IV Continuous <Continuous>  dextrose 50% Injectable 25 Gram(s) IV Push once  enoxaparin Injectable 40 milliGRAM(s) SubCutaneous every 24 hours  gabapentin 300 milliGRAM(s) Oral two times a day  glucagon  Injectable 1 milliGRAM(s) IntraMuscular once  hydrALAZINE 25 milliGRAM(s) Oral three times a day  insulin lispro (ADMELOG) Pump 1 Each SubCutaneous Continuous Pump  losartan 25 milliGRAM(s) Oral daily  metoprolol tartrate 75 milliGRAM(s) Oral every 12 hours  piperacillin/tazobactam IVPB.. 3.375 Gram(s) IV Intermittent every 8 hours  tacrolimus 0.5 milliGRAM(s) Oral <User Schedule>  tacrolimus 1 milliGRAM(s) Oral <User Schedule>  trimethoprim   80 mG/sulfamethoxazole 400 mG 1 Tablet(s) Oral daily    MEDICATIONS  (PRN):  acetaminophen     Tablet .. 650 milliGRAM(s) Oral every 6 hours PRN Temp greater or equal to 38C (100.4F), Mild Pain (1 - 3)  aluminum hydroxide/magnesium hydroxide/simethicone Suspension 30 milliLiter(s) Oral every 4 hours PRN Dyspepsia  dextrose Oral Gel 15 Gram(s) Oral once PRN Blood Glucose LESS THAN 70 milliGRAM(s)/deciliter  melatonin 3 milliGRAM(s) Oral at bedtime PRN Insomnia    Allergies    No Known Allergies    Intolerances    Vital Signs Last 24 Hrs  T(C): 36.6 (16 May 2022 03:59), Max: 37.1 (15 May 2022 21:00)  T(F): 97.8 (16 May 2022 03:59), Max: 98.7 (15 May 2022 21:00)  HR: 55 (16 May 2022 03:59) (55 - 63)  BP: 142/67 (16 May 2022 03:59) (134/66 - 155/78)  BP(mean): --  RR: 18 (16 May 2022 03:59) (18 - 20)  SpO2: 98% (16 May 2022 03:59) (94% - 98%)  I&O's Summary    15 May 2022 07:01  -  16 May 2022 07:00  --------------------------------------------------------  IN: 0 mL / OUT: 900 mL / NET: -900 mL      PHYSICAL EXAM:  TELE: NSR  Constitutional: NAD, awake and alert, obese  HEENT: Moist Mucous Membranes, Anicteric  Pulmonary: Non-labored, breath sounds are clear bilaterally, No wheezing, rales or rhonchi  Cardiovascular: Regular, S1 and S2, No murmurs, rubs, gallops or clicks  Gastrointestinal: Bowel Sounds present, soft, nontender.   Lymph: No peripheral edema. No lymphadenopathy.  Skin: No visible rashes.  +right foot ulcers with dressing dry and intact  Psych:  Mood & affect appropriate  LABS: All Labs Reviewed:                        12.3   6.52  )-----------( 207      ( 14 May 2022 07:47 )             38.0     16 May 2022 08:01    142    |  102    |  19     ----------------------------<  142    4.4     |  35     |  1.10   14 May 2022 07:47    140    |  104    |  16     ----------------------------<  71     4.2     |  31     |  1.10     Ca    9.7        16 May 2022 08:01  Ca    9.3        14 May 2022 07:47  Mg     2.1       14 May 2022 07:47      Patient name: LUTHER NORIEGA  YOB: 1965   Age: 51 (M)   MR#: [id number]  Study Date: 7/27/2017  Location: O/PSonographer: Susie Zuleta RDCS  Study quality: Technically difficult  Referring Physician: Kashmir Ochoa MD  Blood Pressure: 150/77 mmHg  Height: 185 cm  Weight: 136 kg  BSA: 2.6 m2  ------------------------------------------------------------------------  PROCEDURE: Transthoracic echocardiogram with 2-D, M-Mode  and complete spectral and color flow Doppler. Intravenous  catheter inserted. Verbal consent was obtained for  injection of echo contrast following a discussion of risks  and benefits. Following intravenous injection of contrast,  harmonic imaging was performed.  INDICATION: Primary pulmonary hypertension(I27.0)  ------------------------------------------------------------------------  Dimensions:    Normal Values:  LA:     4.0    2.0 - 4.0 cm  Ao:     3.5    2.0 - 3.8 cm  SEPTUM: 1.1    0.6 - 1.2 cm  PWT:    1.2    0.6 - 1.1 cm  LVIDd:  4.9    3.0 -5.6 cm  LVIDs:  3.3    1.8 - 4.0 cm  Derived variables:  LVMI: 83 g/m2  RWT: 0.48  Fractional short: 33 %  EF (Teicholtz): 61 %  ------------------------------------------------------------------------  Observations:  Mitral Valve: Normal mitral valve. Minimal mitral  regurgitation.  Aortic Valve/Aorta: Normal trileaflet aortic valve.  Aortic Root: 3.5 cm.  Left Atrium: Normal left atrium.  LA volume index = 20  cc/m2.  Left Ventricle: Normal left ventricular systolic function.  No segmental wallmotion abnormalities. Endocardial  visualization enhanced with intravenous injection of echo  contrast (Definity). Increased relative wall thickness with  normal left ventricular mass index, consistent with  concentric left ventricular remodeling.  Right Heart: Normal right atrium. Right ventricular  enlargement with normal right ventricular systolic  function. Normal tricuspid valve. Minimal tricuspid  regurgitation. Normal pulmonic valve.  Pericardium/Pleura: Normal pericardium with no pericardial  effusion.  Hemodynamic: Estimated right atrial pressure is 8 mm Hg.  Estimated right ventricular systolic pressure equals 29 mm  Hg, assuming right atrial pressure equals 8 mm Hg,  consistent with normal pulmonary pressures.  ------------------------------------------------------------------------  Conclusions:  1. Increased relative wall thickness with normal left  ventricular mass index, consistent with concentric left  ventricular remodeling.  2. Normal left ventricular systolic function. No segmental  wall motion abnormalities. Endocardial visualization  enhanced with intravenous injection of echo contrast  (Definity).  ------------------------------------------------------------------------  Confirmed on  7/27/2017 - 13:49:24 by PRAVEENA Del Cid.  ------------------------------------------------------------------------    ACC: [id number] EXAM:  XR CHEST PORTABLE URGENT 1V                          PROCEDURE DATE:  05/10/2022          INTERPRETATION:  TIME OF EXAM: May 10, 2022 at 3:08 PM.    CLINICAL INFORMATION: Admission chest x-ray.    COMPARISON:  February 14, 2022.    TECHNIQUE:   AP Portable chest x-ray.    INTERPRETATION:    Heart size and the mediastinum cannot be accurately evaluated on this   projection. Left-sided loop recorder again noted.  Continued elevation of right hemidiaphragm.  There is hazy right basilar opacity. The left lung is clear.  No left pleural effusion.  No pneumothorax.  No acute bony abnormality.    IMPRESSION:  Persistently elevated right hemidiaphragm.    Hazy right basilar opacity, possibly subsegmental atelectasis or a small   right pleural effusion with associated passive atelectasis.    --- End of Report ---    GREGORY IRAHETA MD; Attending Radiologist  This document has been electronically signed. May 10 2022  4:17PM

## 2022-05-16 NOTE — DISCHARGE NOTE PROVIDER - CARE PROVIDER_API CALL
Kobe Miranda)  Medicine  [address] [place] [address] [address]  [place] [address]  Phone: [phone]  Fax: [phone]  Established Patient  Follow Up Time: 1 week    Donte Catherine (DPJENN)  Podiatric Medicine  [address] [place] [address]  Phone: [phone]  Fax: [phone]  Established Patient  Follow Up Time: 1-3 days

## 2022-05-16 NOTE — PROGRESS NOTE ADULT - ATTENDING COMMENTS
Agree with above findings and plan

## 2022-05-16 NOTE — PROGRESS NOTE ADULT - PROBLEM SELECTOR PLAN 2
Continue losartan, metoprolol, hydralazine and clonidine

## 2022-05-16 NOTE — DISCHARGE NOTE NURSING/CASE MANAGEMENT/SOCIAL WORK - NSDCPEFALRISK_GEN_ALL_CORE
For information on Fall & Injury Prevention, visit: [email or web address] OR  [email or web address] OR  https://www.cdc.gov/steadi/patient.html

## 2022-05-17 NOTE — ASSESSMENT
[Verbal] : Verbal [Demo] : Demo [Patient] : Patient [Good - alert, interested, motivated] : Good - alert, interested, motivated [Verbalizes knowledge/Understanding] : Verbalizes knowledge/understanding [Dressing changes] : dressing changes [Foot Care] : foot care [Skin Care] : skin care [Signs and symptoms of infection] : sign and symptoms of infection [Nutrition] : nutrition [How and When to Call] : how and when to call [Pain Management] : pain management [Off-loading] : off-loading [Patient responsibility to plan of care] : patient responsibility to plan of care [Glycemic Control] : glycemic control [] : Yes [Stable] : stable [Home] : Home [Ambulatory] : Ambulatory [FreeTextEntry2] : Pressure Relief \par Localized Wound Care\par Infection Prevention \par Surgical procedure\par F/U 1 week  [FreeTextEntry4] : Pt to be admitted to [facility] for surgical intervention on 5/16/22 as per DPM. \par F/U 1 week \par \par **Patient's vitas were inadvertently deleted from the note. The vitals were within normal limits at the time of the visit.**

## 2022-05-17 NOTE — PHYSICAL EXAM
[4 x 4] : 4 x 4  [Abdominal Pad] : Abdominal Pad [0] : left 0 [1+] : left 1+ [Skin Ulcer] : ulcer [Alert] : alert [Oriented to Person] : oriented to person [Oriented to Place] : oriented to place [Oriented to Time] : oriented to time [Calm] : calm [Varicose Veins Of Lower Extremities] : not present [] : not present [Purpura] : no purpura  [Petechiae] : no petechiae [Skin Induration] : no induration [de-identified] : adult WM, NAD, alert, Ox 3. [de-identified] : HTN, HLD [de-identified] : Diabetic Charcot right foot deformity [de-identified] : right foot plantar ulcer down to skin, subcutaneous tissue, fat.  right lateral 5th metatarsal ulcer down to skin, subcutaneous tissue, and fat. [de-identified] : Diabetic neuropathy  [FreeTextEntry1] : Plantar Foot  [FreeTextEntry2] : 2.6 [FreeTextEntry3] : 2.5 [FreeTextEntry4] : 0.2 [de-identified] : Serosanguineous  [de-identified] : Callus  [de-identified] : Silver Alginate [de-identified] : Mechanically cleansed with 0.9%normal saline and sterile gauze\par  [FreeTextEntry7] :  Lateral Foot 5th Met  [FreeTextEntry8] : 1.3 [FreeTextEntry9] : 1.2 [de-identified] : 0.2 [de-identified] : Serosanguineous  [de-identified] : Callus  [de-identified] : 90% [de-identified] : 10% [de-identified] : Silver Alginate [de-identified] : Mechanically cleansed with 0.9%normal saline and sterile gauze\par  [TWNoteComboBox1] : Right [TWNoteComboBox4] : Moderate [de-identified] : other [de-identified] : None [de-identified] : None [de-identified] : 100% [de-identified] : No [de-identified] : 3x Weekly [de-identified] : Primary Dressing [TWNoteComboBox9] : Right [de-identified] : Moderate [de-identified] : other [de-identified] : None [de-identified] : None [de-identified] : >75% [de-identified] : Yes [de-identified] : 3x Weekly [de-identified] : Primary Dressing

## 2022-05-17 NOTE — HISTORY OF PRESENT ILLNESS
[FreeTextEntry1] : DFU 3 plantar right base 5th metatarsal and lateral head of the 5th metatarsal left foot , no soi , 5th met head probes to bone . Patient scheduled for met head resection and plantar planing of the 5th metatarsal base .

## 2022-05-17 NOTE — VITALS
[Pain related to present condition?] : The patient's  pain is not related to present condition. [] : No [de-identified] : 0/10

## 2022-05-17 NOTE — PLAN
[FreeTextEntry1] : continue off loading and wound care , patient aware of the proposed surgical procedure and is in agreement with the plan and is aware of the complications, sequela,and post op recovery . All questions answered Spent 20 minutes for patient care and medical decision making.\par

## 2022-05-17 NOTE — REVIEW OF SYSTEMS
[Fever] : no fever [Chills] : no chills [Eye Pain] : no eye pain [Loss Of Hearing] : no hearing loss [Shortness Of Breath] : no shortness of breath [Abdominal Pain] : no abdominal pain [Vomiting] : no vomiting [Skin Lesions] : no skin lesions [Skin Wound] : no skin wound [Anxiety] : no anxiety [Easy Bleeding] : no tendency for easy bleeding [FreeTextEntry5] : HTN,HLD [FreeTextEntry8] : Kidney Transplant  [FreeTextEntry7] : 40.9 BMI , morbid obesity  [FreeTextEntry9] : Associated Diabetic Charcot foot  [de-identified] : plantar right foot midfoot , lateral  DFU 2  , large area of peripheral callus build up , the ulcer is clean and granular [de-identified] : IDDM with neuropathy  [de-identified] : MARILUZ

## 2022-05-20 ENCOUNTER — OUTPATIENT (OUTPATIENT)
Dept: OUTPATIENT SERVICES | Facility: HOSPITAL | Age: 57
LOS: 1 days | Discharge: HOME | End: 2022-05-20
Payer: MEDICARE

## 2022-05-20 ENCOUNTER — APPOINTMENT (OUTPATIENT)
Dept: WOUND CARE | Facility: HOSPITAL | Age: 57
End: 2022-05-20
Payer: MEDICARE

## 2022-05-20 VITALS
RESPIRATION RATE: 20 BRPM | OXYGEN SATURATION: 95 % | WEIGHT: 300 LBS | DIASTOLIC BLOOD PRESSURE: 81 MMHG | HEART RATE: 62 BPM | SYSTOLIC BLOOD PRESSURE: 165 MMHG | BODY MASS INDEX: 39.76 KG/M2 | TEMPERATURE: 99 F | HEIGHT: 73 IN

## 2022-05-20 DIAGNOSIS — E11.621 TYPE 2 DIABETES MELLITUS WITH FOOT ULCER: ICD-10-CM

## 2022-05-20 DIAGNOSIS — N18.6 END STAGE RENAL DISEASE: Chronic | ICD-10-CM

## 2022-05-20 DIAGNOSIS — Z94.0 KIDNEY TRANSPLANT STATUS: Chronic | ICD-10-CM

## 2022-05-20 DIAGNOSIS — Z89.421 ACQUIRED ABSENCE OF OTHER RIGHT TOE(S): Chronic | ICD-10-CM

## 2022-05-20 PROCEDURE — 99024 POSTOP FOLLOW-UP VISIT: CPT

## 2022-05-20 PROCEDURE — 71046 X-RAY EXAM CHEST 2 VIEWS: CPT

## 2022-05-20 PROCEDURE — G0463: CPT

## 2022-05-20 PROCEDURE — 71046 X-RAY EXAM CHEST 2 VIEWS: CPT | Mod: 26

## 2022-05-20 NOTE — ASSESSMENT
[Verbal] : Verbal [Written] : Written [Demo] : Demo [Patient] : Patient [Spouse] : Spouse [Good - alert, interested, motivated] : Good - alert, interested, motivated [Demonstrates independently] : demonstrates independently [Dressing changes] : dressing changes [Foot Care] : foot care [Skin Care] : skin care [Signs and symptoms of infection] : sign and symptoms of infection [Surgery] : surgery [Venous Disease] : venous disease [Nutrition] : nutrition [How and When to Call] : how and when to call [Hyperbaric Therapy] : hyperbaric therapy [Off-loading] : off-loading [Compression Therapy] : compression therapy [Patient responsibility to plan of care] : patient responsibility to plan of care [Glycemic Control] : glycemic control [] : Yes [Stable] : stable [Other: ____] : [unfilled] [Wheelchair] : Wheelchair [Not Applicable - Long Term Care/Home Health Agency] : Long Term Care/Home Health Agency: Not Applicable [FreeTextEntry2] : Infection prevention \par Wound care (dressing changes)\par Maintain optimal skin integrity to high pressure areas\par Compression therapy\par Nutrition and wound healing\par Elevation and low sodium compliance.\par Pressure relief/ Pressure redistribution\par Offloading the stress on skin structures and decreasing potential pathologic biomechanical influences.\par HBOT \par 2View CXR [FreeTextEntry3] : S/P Surgery [FreeTextEntry4] : Pathology reviewed by KYLE. DPM to be in contact with Dr. Saucedo for possible I.V. abt therapy placement.\par Pt processed for HBOT. \par Authorization submitted ([id number])\par 2.4 DEEPAK order signed by DPM\par Pre-procedure checklist signed\par Pt declined orientation video. Reason: Pt has already received HBOT in the past, and does not feel the orientation video is necessary. \par HBOT consent signed.\par B/W completed while Pt was IP at [facility]\par Pt discharged to [facility] for completion of 2 View CXR to r/o blebs for HBOT clearance. \par \par ** To be completed **\par TM & COVID 19 PCR screening. To be completed once authorized and approved.\par \par Pt to f/u in 1 week or sooner if HBOT is approved. Pt aware to await phone call from chamber supervisor for HBOT start.

## 2022-05-20 NOTE — PHYSICAL EXAM
[4 x 4] : 4 x 4  [Abdominal Pad] : Abdominal Pad [0] : left 0 [1+] : left 1+ [Varicose Veins Of Lower Extremities] : not present [] : not present [Purpura] : no purpura  [Petechiae] : no petechiae [Skin Ulcer] : ulcer [Skin Induration] : no induration [Alert] : alert [Oriented to Person] : oriented to person [Oriented to Place] : oriented to place [Oriented to Time] : oriented to time [Calm] : calm [de-identified] : adult WM, NAD, alert, Ox 3. [de-identified] : HTN, HLD [de-identified] : Diabetic Charcot right foot deformity [de-identified] : right foot plantar ulcer 5th metatarsal healing well sutures intact , head of 5th metatarsal healing well , smaller and closing  [de-identified] : Diabetic neuropathy  [de-identified] : DPM removed some sutures [FreeTextEntry1] : Right Foot S/P 5th Met Amp Site - Sutures intact [FreeTextEntry2] : 10.5 [FreeTextEntry3] : 1.4 [FreeTextEntry4] : 0.2 [de-identified] : Moderate serosanguineous [de-identified] : callused - DPM shaved callus [de-identified] : None [FreeTextEntry5] : None [de-identified] : Pt expressed comfort post ACE application. Circ/Neuromuscular functions WNL post application. [de-identified] : Alginate Ag [de-identified] : Mechanically cleansed with sterile gauze and normal saline 0.9%\par Dry Dressing\par  [FreeTextEntry7] : Right Plantar Foot Ulcer [FreeTextEntry8] : 2.0 [FreeTextEntry9] : 2.1 [de-identified] : 0.2 [de-identified] : serosanguineous [de-identified] : callused - DPM shaved callus [de-identified] : 100% [de-identified] : Pt expressed comfort post ACE application. Circ/Neuromuscular functions WNL post application. [de-identified] : Alginate Ag [de-identified] : Mechanically cleansed with sterile gauze and normal saline 0.9%\par Dry Dressing\par  [TWNoteComboBox5] : No [TWNoteComboBox6] : Surgical [de-identified] : No [de-identified] : other [de-identified] : None [de-identified] : None [de-identified] : 100% [de-identified] : No [de-identified] : Ace wraps [de-identified] : 3x Weekly [de-identified] : Primary Dressing [de-identified] : Moderate [de-identified] : No [de-identified] : Other [de-identified] : No [de-identified] : other [de-identified] : None [de-identified] : Ace wraps [de-identified] : 3x Weekly [de-identified] : Primary Dressing

## 2022-05-20 NOTE — HISTORY OF PRESENT ILLNESS
[FreeTextEntry1] : s/p 5th right met head resection and plantar planing of the 5th metatarsal base right foot , clean , granular and smaller + osteomyelitis

## 2022-05-20 NOTE — PLAN
[FreeTextEntry1] : continue wound care and of loading . Spoke with Dr Saucedo regarding pathology and cultures , he will contacy patient in regards to possible PICC line . Patient would also benefit from HBOT  for DFU 3 and chronic underlying osteomyelitis  Spent 20 minutes for patient care and medical decision making.\par

## 2022-05-20 NOTE — REVIEW OF SYSTEMS
[Fever] : no fever [Chills] : no chills [Eye Pain] : no eye pain [Loss Of Hearing] : no hearing loss [Shortness Of Breath] : no shortness of breath [Abdominal Pain] : no abdominal pain [Vomiting] : no vomiting [Skin Lesions] : no skin lesions [Skin Wound] : no skin wound [Anxiety] : no anxiety [Easy Bleeding] : no tendency for easy bleeding [FreeTextEntry5] : HTN,HLD [FreeTextEntry7] : 40.9 BMI , morbid obesity  [FreeTextEntry8] : Kidney Transplant  [FreeTextEntry9] : Associated Diabetic Charcot foot  [de-identified] : plantar right foot midfoot , lateral  DFU 2  , large area of peripheral callus build up , the ulcer is clean and granular s/p surgical intervention , sutures intact - soi  [de-identified] : IDDM with neuropathy  [de-identified] : MARILUZ

## 2022-05-21 DIAGNOSIS — Z86.16 PERSONAL HISTORY OF COVID-19: ICD-10-CM

## 2022-05-21 DIAGNOSIS — L97.412 NON-PRESSURE CHRONIC ULCER OF RIGHT HEEL AND MIDFOOT WITH FAT LAYER EXPOSED: ICD-10-CM

## 2022-05-21 DIAGNOSIS — Z85.828 PERSONAL HISTORY OF OTHER MALIGNANT NEOPLASM OF SKIN: ICD-10-CM

## 2022-05-21 DIAGNOSIS — Z86.718 PERSONAL HISTORY OF OTHER VENOUS THROMBOSIS AND EMBOLISM: ICD-10-CM

## 2022-05-21 DIAGNOSIS — E11.51 TYPE 2 DIABETES MELLITUS WITH DIABETIC PERIPHERAL ANGIOPATHY WITHOUT GANGRENE: ICD-10-CM

## 2022-05-21 DIAGNOSIS — Z79.4 LONG TERM (CURRENT) USE OF INSULIN: ICD-10-CM

## 2022-05-21 DIAGNOSIS — E11.610 TYPE 2 DIABETES MELLITUS WITH DIABETIC NEUROPATHIC ARTHROPATHY: ICD-10-CM

## 2022-05-21 DIAGNOSIS — Z89.421 ACQUIRED ABSENCE OF OTHER RIGHT TOE(S): ICD-10-CM

## 2022-05-21 DIAGNOSIS — Z86.73 PERSONAL HISTORY OF TRANSIENT ISCHEMIC ATTACK (TIA), AND CEREBRAL INFARCTION WITHOUT RESIDUAL DEFICITS: ICD-10-CM

## 2022-05-21 DIAGNOSIS — G47.33 OBSTRUCTIVE SLEEP APNEA (ADULT) (PEDIATRIC): ICD-10-CM

## 2022-05-21 DIAGNOSIS — E78.5 HYPERLIPIDEMIA, UNSPECIFIED: ICD-10-CM

## 2022-05-21 DIAGNOSIS — Z87.81 PERSONAL HISTORY OF (HEALED) TRAUMATIC FRACTURE: ICD-10-CM

## 2022-05-21 DIAGNOSIS — E11.621 TYPE 2 DIABETES MELLITUS WITH FOOT ULCER: ICD-10-CM

## 2022-05-21 DIAGNOSIS — Z89.422 ACQUIRED ABSENCE OF OTHER LEFT TOE(S): ICD-10-CM

## 2022-05-21 DIAGNOSIS — Z94.0 KIDNEY TRANSPLANT STATUS: ICD-10-CM

## 2022-05-21 DIAGNOSIS — E66.01 MORBID (SEVERE) OBESITY DUE TO EXCESS CALORIES: ICD-10-CM

## 2022-05-21 DIAGNOSIS — Z83.3 FAMILY HISTORY OF DIABETES MELLITUS: ICD-10-CM

## 2022-05-21 DIAGNOSIS — Z80.3 FAMILY HISTORY OF MALIGNANT NEOPLASM OF BREAST: ICD-10-CM

## 2022-05-21 DIAGNOSIS — Z79.82 LONG TERM (CURRENT) USE OF ASPIRIN: ICD-10-CM

## 2022-05-21 DIAGNOSIS — L84 CORNS AND CALLOSITIES: ICD-10-CM

## 2022-05-21 DIAGNOSIS — Z79.899 OTHER LONG TERM (CURRENT) DRUG THERAPY: ICD-10-CM

## 2022-05-21 DIAGNOSIS — Z80.8 FAMILY HISTORY OF MALIGNANT NEOPLASM OF OTHER ORGANS OR SYSTEMS: ICD-10-CM

## 2022-05-23 ENCOUNTER — OUTPATIENT (OUTPATIENT)
Dept: OUTPATIENT SERVICES | Facility: HOSPITAL | Age: 57
LOS: 1 days | Discharge: HOME | End: 2022-05-23
Payer: MEDICARE

## 2022-05-23 ENCOUNTER — NON-APPOINTMENT (OUTPATIENT)
Age: 57
End: 2022-05-23

## 2022-05-23 ENCOUNTER — APPOINTMENT (OUTPATIENT)
Dept: WOUND CARE | Facility: HOSPITAL | Age: 57
End: 2022-05-23

## 2022-05-23 VITALS
HEIGHT: 73 IN | SYSTOLIC BLOOD PRESSURE: 165 MMHG | WEIGHT: 300 LBS | RESPIRATION RATE: 20 BRPM | HEART RATE: 78 BPM | OXYGEN SATURATION: 95 % | TEMPERATURE: 97.4 F | BODY MASS INDEX: 39.76 KG/M2 | DIASTOLIC BLOOD PRESSURE: 75 MMHG

## 2022-05-23 DIAGNOSIS — N18.6 END STAGE RENAL DISEASE: Chronic | ICD-10-CM

## 2022-05-23 DIAGNOSIS — Z89.421 ACQUIRED ABSENCE OF OTHER RIGHT TOE(S): Chronic | ICD-10-CM

## 2022-05-23 DIAGNOSIS — Z11.59 ENCOUNTER FOR SCREENING FOR OTHER VIRAL DISEASES: ICD-10-CM

## 2022-05-23 DIAGNOSIS — Z94.0 KIDNEY TRANSPLANT STATUS: Chronic | ICD-10-CM

## 2022-05-23 LAB — SARS-COV-2 RNA SPEC QL NAA+PROBE: SIGNIFICANT CHANGE UP

## 2022-05-23 PROCEDURE — U0005: CPT

## 2022-05-23 PROCEDURE — U0003: CPT

## 2022-05-23 PROCEDURE — G0463: CPT

## 2022-05-24 ENCOUNTER — APPOINTMENT (OUTPATIENT)
Dept: HYPERBARIC MEDICINE | Facility: HOSPITAL | Age: 57
End: 2022-05-24

## 2022-05-24 DIAGNOSIS — Z20.822 CONTACT WITH AND (SUSPECTED) EXPOSURE TO COVID-19: ICD-10-CM

## 2022-05-25 ENCOUNTER — APPOINTMENT (OUTPATIENT)
Dept: HYPERBARIC MEDICINE | Facility: HOSPITAL | Age: 57
End: 2022-05-25

## 2022-05-25 ENCOUNTER — NON-APPOINTMENT (OUTPATIENT)
Age: 57
End: 2022-05-25

## 2022-05-26 ENCOUNTER — APPOINTMENT (OUTPATIENT)
Dept: WOUND CARE | Facility: HOSPITAL | Age: 57
End: 2022-05-26

## 2022-05-26 ENCOUNTER — OUTPATIENT (OUTPATIENT)
Dept: OUTPATIENT SERVICES | Facility: HOSPITAL | Age: 57
LOS: 1 days | Discharge: HOME | End: 2022-05-26
Payer: MEDICARE

## 2022-05-26 VITALS
DIASTOLIC BLOOD PRESSURE: 74 MMHG | TEMPERATURE: 98.5 F | RESPIRATION RATE: 20 BRPM | SYSTOLIC BLOOD PRESSURE: 138 MMHG | OXYGEN SATURATION: 93 % | HEART RATE: 67 BPM | BODY MASS INDEX: 39.76 KG/M2 | WEIGHT: 300 LBS | HEIGHT: 73 IN

## 2022-05-26 DIAGNOSIS — Z79.899 OTHER LONG TERM (CURRENT) DRUG THERAPY: ICD-10-CM

## 2022-05-26 DIAGNOSIS — E78.5 HYPERLIPIDEMIA, UNSPECIFIED: ICD-10-CM

## 2022-05-26 DIAGNOSIS — E11.621 TYPE 2 DIABETES MELLITUS WITH FOOT ULCER: ICD-10-CM

## 2022-05-26 DIAGNOSIS — Z94.0 KIDNEY TRANSPLANT STATUS: ICD-10-CM

## 2022-05-26 DIAGNOSIS — Z79.4 LONG TERM (CURRENT) USE OF INSULIN: ICD-10-CM

## 2022-05-26 DIAGNOSIS — E11.51 TYPE 2 DIABETES MELLITUS WITH DIABETIC PERIPHERAL ANGIOPATHY WITHOUT GANGRENE: ICD-10-CM

## 2022-05-26 DIAGNOSIS — L97.412 NON-PRESSURE CHRONIC ULCER OF RIGHT HEEL AND MIDFOOT WITH FAT LAYER EXPOSED: ICD-10-CM

## 2022-05-26 DIAGNOSIS — Z86.73 PERSONAL HISTORY OF TRANSIENT ISCHEMIC ATTACK (TIA), AND CEREBRAL INFARCTION WITHOUT RESIDUAL DEFICITS: ICD-10-CM

## 2022-05-26 DIAGNOSIS — Z94.0 KIDNEY TRANSPLANT STATUS: Chronic | ICD-10-CM

## 2022-05-26 DIAGNOSIS — Z87.81 PERSONAL HISTORY OF (HEALED) TRAUMATIC FRACTURE: ICD-10-CM

## 2022-05-26 DIAGNOSIS — Z85.828 PERSONAL HISTORY OF OTHER MALIGNANT NEOPLASM OF SKIN: ICD-10-CM

## 2022-05-26 DIAGNOSIS — Z89.421 ACQUIRED ABSENCE OF OTHER RIGHT TOE(S): Chronic | ICD-10-CM

## 2022-05-26 DIAGNOSIS — Z83.3 FAMILY HISTORY OF DIABETES MELLITUS: ICD-10-CM

## 2022-05-26 DIAGNOSIS — Z79.82 LONG TERM (CURRENT) USE OF ASPIRIN: ICD-10-CM

## 2022-05-26 DIAGNOSIS — Z89.422 ACQUIRED ABSENCE OF OTHER LEFT TOE(S): ICD-10-CM

## 2022-05-26 DIAGNOSIS — G47.33 OBSTRUCTIVE SLEEP APNEA (ADULT) (PEDIATRIC): ICD-10-CM

## 2022-05-26 DIAGNOSIS — E11.610 TYPE 2 DIABETES MELLITUS WITH DIABETIC NEUROPATHIC ARTHROPATHY: ICD-10-CM

## 2022-05-26 DIAGNOSIS — L84 CORNS AND CALLOSITIES: ICD-10-CM

## 2022-05-26 DIAGNOSIS — E66.01 MORBID (SEVERE) OBESITY DUE TO EXCESS CALORIES: ICD-10-CM

## 2022-05-26 DIAGNOSIS — N18.6 END STAGE RENAL DISEASE: Chronic | ICD-10-CM

## 2022-05-26 DIAGNOSIS — Z86.718 PERSONAL HISTORY OF OTHER VENOUS THROMBOSIS AND EMBOLISM: ICD-10-CM

## 2022-05-26 DIAGNOSIS — Z80.3 FAMILY HISTORY OF MALIGNANT NEOPLASM OF BREAST: ICD-10-CM

## 2022-05-26 DIAGNOSIS — Z80.8 FAMILY HISTORY OF MALIGNANT NEOPLASM OF OTHER ORGANS OR SYSTEMS: ICD-10-CM

## 2022-05-26 DIAGNOSIS — Z89.421 ACQUIRED ABSENCE OF OTHER RIGHT TOE(S): ICD-10-CM

## 2022-05-26 DIAGNOSIS — Z86.16 PERSONAL HISTORY OF COVID-19: ICD-10-CM

## 2022-05-26 PROCEDURE — G0463: CPT

## 2022-05-26 NOTE — REVIEW OF SYSTEMS
[Fever] : no fever [Chills] : no chills [Eye Pain] : no eye pain [Loss Of Hearing] : no hearing loss [Shortness Of Breath] : no shortness of breath [Abdominal Pain] : no abdominal pain [Vomiting] : no vomiting [Skin Lesions] : no skin lesions [Skin Wound] : no skin wound [Anxiety] : no anxiety [Easy Bleeding] : no tendency for easy bleeding [FreeTextEntry5] : HTN,HLD [FreeTextEntry7] : 40.9 BMI , morbid obesity  [FreeTextEntry8] : Kidney Transplant  [FreeTextEntry9] : Associated Diabetic Charcot foot  [de-identified] : plantar right foot midfoot , lateral  DFU 2  , large area of peripheral callus build up , the ulcer is clean and granular s/p surgical intervention , sutures intact - soi  [de-identified] : IDDM with neuropathy  [de-identified] : MARILUZ

## 2022-05-26 NOTE — ASSESSMENT
[Verbal] : Verbal [Written] : Written [Demo] : Demo [Patient] : Patient [Family member] : Family member [Good - alert, interested, motivated] : Good - alert, interested, motivated [Verbalizes knowledge/Understanding] : Verbalizes knowledge/understanding [Dressing changes] : dressing changes [Foot Care] : foot care [Skin Care] : skin care [Signs and symptoms of infection] : sign and symptoms of infection [Nutrition] : nutrition [How and When to Call] : how and when to call [Labs and Tests] : labs and tests [Pain Management] : pain management [Hyperbaric Therapy] : hyperbaric therapy [Off-loading] : off-loading [Compression Therapy] : compression therapy [Patient responsibility to plan of care] : patient responsibility to plan of care [Glycemic Control] : glycemic control [Stable] : stable [Home] : Home [Ambulatory] : Ambulatory [Not Applicable - Long Term Care/Home Health Agency] : Long Term Care/Home Health Agency: Not Applicable [] : No [FreeTextEntry2] : Infection prevention\par Localized wound care \par Goal remaining pain free regarding wounds\par Offloading \par Promote optimal nutrition \par Hyperbarics  [FreeTextEntry4] : patient to continue PO Amoxicillin  \par Patient had CT chest completed today at [facility]. \par Patient saw his pulmonologist Dr. Boyce in [facility] and will give pulmonary clearance once CT results available. Per patient, [facility] knows to fax results to pulmonologist and to [facility]. \par Covid - 19 swab will still have to be obtained pending start date of HBO. \par Follow up in 1 week or pending HBO.

## 2022-05-26 NOTE — HISTORY OF PRESENT ILLNESS
[FreeTextEntry1] : s/p 5th met head right resection and plantar resection of bone from the base of the right 5th metatarsal , no soi ulcers clean smaller and granular

## 2022-05-26 NOTE — PLAN
[FreeTextEntry1] : continue wound care and off loading , strrt HBOT when pulmonary clears the patient  Spent 20 minutes for patient care and medical decision making.\par

## 2022-05-27 ENCOUNTER — APPOINTMENT (OUTPATIENT)
Dept: HYPERBARIC MEDICINE | Facility: HOSPITAL | Age: 57
End: 2022-05-27

## 2022-05-31 ENCOUNTER — APPOINTMENT (OUTPATIENT)
Dept: HYPERBARIC MEDICINE | Facility: HOSPITAL | Age: 57
End: 2022-05-31

## 2022-06-01 ENCOUNTER — APPOINTMENT (OUTPATIENT)
Dept: HYPERBARIC MEDICINE | Facility: HOSPITAL | Age: 57
End: 2022-06-01

## 2022-06-01 ENCOUNTER — NON-APPOINTMENT (OUTPATIENT)
Age: 57
End: 2022-06-01

## 2022-06-02 ENCOUNTER — APPOINTMENT (OUTPATIENT)
Dept: WOUND CARE | Facility: HOSPITAL | Age: 57
End: 2022-06-02

## 2022-06-02 ENCOUNTER — OUTPATIENT (OUTPATIENT)
Dept: OUTPATIENT SERVICES | Facility: HOSPITAL | Age: 57
LOS: 1 days | Discharge: HOME | End: 2022-06-02
Payer: MEDICARE

## 2022-06-02 ENCOUNTER — APPOINTMENT (OUTPATIENT)
Dept: HYPERBARIC MEDICINE | Facility: HOSPITAL | Age: 57
End: 2022-06-02

## 2022-06-02 VITALS
HEIGHT: 73 IN | DIASTOLIC BLOOD PRESSURE: 80 MMHG | HEART RATE: 58 BPM | TEMPERATURE: 98.5 F | SYSTOLIC BLOOD PRESSURE: 159 MMHG | RESPIRATION RATE: 20 BRPM | WEIGHT: 300 LBS | OXYGEN SATURATION: 93 % | BODY MASS INDEX: 39.76 KG/M2

## 2022-06-02 DIAGNOSIS — Z89.421 ACQUIRED ABSENCE OF OTHER RIGHT TOE(S): Chronic | ICD-10-CM

## 2022-06-02 DIAGNOSIS — E11.621 TYPE 2 DIABETES MELLITUS WITH FOOT ULCER: ICD-10-CM

## 2022-06-02 DIAGNOSIS — N18.6 END STAGE RENAL DISEASE: Chronic | ICD-10-CM

## 2022-06-02 DIAGNOSIS — Z94.0 KIDNEY TRANSPLANT STATUS: Chronic | ICD-10-CM

## 2022-06-02 PROCEDURE — G0463: CPT

## 2022-06-02 NOTE — ASSESSMENT
[Verbal] : Verbal [Written] : Written [Demo] : Demo [Patient] : Patient [Family member] : Family member [Good - alert, interested, motivated] : Good - alert, interested, motivated [Verbalizes knowledge/Understanding] : Verbalizes knowledge/understanding [Dressing changes] : dressing changes [Foot Care] : foot care [Skin Care] : skin care [Signs and symptoms of infection] : sign and symptoms of infection [Nutrition] : nutrition [How and When to Call] : how and when to call [Labs and Tests] : labs and tests [Pain Management] : pain management [Hyperbaric Therapy] : hyperbaric therapy [Off-loading] : off-loading [Compression Therapy] : compression therapy [Patient responsibility to plan of care] : patient responsibility to plan of care [Glycemic Control] : glycemic control [Stable] : stable [Home] : Home [Ambulatory] : Ambulatory [Not Applicable - Long Term Care/Home Health Agency] : Long Term Care/Home Health Agency: Not Applicable [] : Yes [FreeTextEntry2] : Infection prevention\par Localized wound care \par Goal remaining pain free regarding wounds\par Offloading \par Promote optimal nutrition \par Hyperbarics  [FreeTextEntry4] : Patient to continue PO Amoxicillin  \par Pt was placed on a Diuretic for 30 days by Pulmonologist Dr. Boyce in [facility] and will give pulmonary clearance once fluid is removed. Pt is having fluid aspirated tomorrow at [place] by Pulmonologist.  \par Covid - 19 swab will still have to be obtained pending start date of HBO. \par Follow up to [facility] in 1 Week or pending HBO.

## 2022-06-02 NOTE — REVIEW OF SYSTEMS
[Fever] : no fever [Chills] : no chills [Eye Pain] : no eye pain [Loss Of Hearing] : no hearing loss [Shortness Of Breath] : no shortness of breath [Abdominal Pain] : no abdominal pain [Vomiting] : no vomiting [Skin Lesions] : no skin lesions [Skin Wound] : no skin wound [Anxiety] : no anxiety [Easy Bleeding] : no tendency for easy bleeding [FreeTextEntry5] : HTN,HLD [FreeTextEntry7] : 40.9 BMI , morbid obesity  [FreeTextEntry8] : Kidney Transplant  [FreeTextEntry9] : Associated Diabetic Charcot foot  [de-identified] : plantar right foot midfoot , lateral  DFU 2  , large area of peripheral callus build up , the ulcer is clean and granular s/p surgical intervention , sutures intact - soi  [de-identified] : IDDM with neuropathy  [de-identified] : MARILUZ

## 2022-06-02 NOTE — PHYSICAL EXAM
[4 x 4] : 4 x 4  [Abdominal Pad] : Abdominal Pad [0] : left 0 [1+] : left 1+ [Varicose Veins Of Lower Extremities] : not present [] : not present [Purpura] : no purpura  [Petechiae] : no petechiae [Skin Ulcer] : ulcer [Skin Induration] : no induration [Alert] : alert [Oriented to Person] : oriented to person [Oriented to Place] : oriented to place [Oriented to Time] : oriented to time [Calm] : calm [de-identified] : adult WM, NAD, alert, Ox 3. [de-identified] : Diabetic Charcot right foot deformity [de-identified] : right foot plantar ulcer 5th metatarsal healing well sutures removed , head of 5th metatarsal healing well , smaller and closing  [de-identified] : Diabetic neuropathy  [FreeTextEntry1] : Right Foot S/P 5th Met Amp Site - Sutures intact- small dehisced  measured  [FreeTextEntry2] : 0.7 [FreeTextEntry3] : 0.6 [FreeTextEntry4] : 0.1 [de-identified] :  serosanguineous [de-identified] : Callus Removed by DPM [de-identified] : Pt expressed comfort post ACE application. Circ/Neuromuscular functions WNL post application. [de-identified] : Silver Alginate  [de-identified] : Mechanically Cleansed with Sterile Gauze & Normal Saline  [FreeTextEntry7] : Right Plantar Foot Ulcer [FreeTextEntry8] : 2.0 [FreeTextEntry9] : 2.1 [de-identified] : 0.2 [de-identified] : serosanguineous [de-identified] : callus- removed by DPM [de-identified] : Pt expressed comfort post ACE application. Circ/Neuromuscular functions WNL post application. [de-identified] : Silver Alginate  [de-identified] : Mechanically Cleansed with Sterile Gauze & Normal Saline  [de-identified] : Right lateral distal foot  [de-identified] : 0.6 [de-identified] : 1.8 [de-identified] : 0.1 [de-identified] : Serosanguineous  [de-identified] : Callus - Removed by DPM [de-identified] : Expressed comfort post ACE application. [de-identified] : Silver alginate [de-identified] : Mechanically Cleansed with Sterile Gauze & Normal Saline  [TWNoteComboBox4] : Small [de-identified] : other [de-identified] : None [de-identified] : None [de-identified] : 100% [de-identified] : No [de-identified] : Ace wraps [de-identified] : Every other day [de-identified] : False [de-identified] : Small [de-identified] : other [de-identified] : None [de-identified] : None [de-identified] : 100% [de-identified] : No [de-identified] : Ace wraps [de-identified] : Every other day [de-identified] : False [de-identified] : Small [de-identified] : other [de-identified] : None [de-identified] : None [de-identified] : 100% [de-identified] : No [de-identified] : Ace wraps [de-identified] : Every other day

## 2022-06-02 NOTE — PLAN
[FreeTextEntry1] : continue off loading and wound care , patient to start HBOT once patient has fluid removed from his lung . The peripheral callus was removed from the surrounding tissue and periwound with # 15 blade Spent 20 minutes for patient care and medical decision making.\par

## 2022-06-02 NOTE — VITALS
[] : No [de-identified] : No pain 0/10  [FreeTextEntry5] : Pt was placed on a diuretic, Pt unaware of name or dose

## 2022-06-02 NOTE — HISTORY OF PRESENT ILLNESS
[FreeTextEntry1] : s/p right 5th met head resection and plantar base right 5th metatarsal planing , healing well ulcers clean small and granular

## 2022-06-03 ENCOUNTER — APPOINTMENT (OUTPATIENT)
Dept: HYPERBARIC MEDICINE | Facility: HOSPITAL | Age: 57
End: 2022-06-03

## 2022-06-03 DIAGNOSIS — Z80.3 FAMILY HISTORY OF MALIGNANT NEOPLASM OF BREAST: ICD-10-CM

## 2022-06-03 DIAGNOSIS — Z94.0 KIDNEY TRANSPLANT STATUS: ICD-10-CM

## 2022-06-03 DIAGNOSIS — Z86.73 PERSONAL HISTORY OF TRANSIENT ISCHEMIC ATTACK (TIA), AND CEREBRAL INFARCTION WITHOUT RESIDUAL DEFICITS: ICD-10-CM

## 2022-06-03 DIAGNOSIS — E66.01 MORBID (SEVERE) OBESITY DUE TO EXCESS CALORIES: ICD-10-CM

## 2022-06-03 DIAGNOSIS — E78.5 HYPERLIPIDEMIA, UNSPECIFIED: ICD-10-CM

## 2022-06-03 DIAGNOSIS — E11.610 TYPE 2 DIABETES MELLITUS WITH DIABETIC NEUROPATHIC ARTHROPATHY: ICD-10-CM

## 2022-06-03 DIAGNOSIS — E11.621 TYPE 2 DIABETES MELLITUS WITH FOOT ULCER: ICD-10-CM

## 2022-06-03 DIAGNOSIS — L97.412 NON-PRESSURE CHRONIC ULCER OF RIGHT HEEL AND MIDFOOT WITH FAT LAYER EXPOSED: ICD-10-CM

## 2022-06-03 DIAGNOSIS — G47.33 OBSTRUCTIVE SLEEP APNEA (ADULT) (PEDIATRIC): ICD-10-CM

## 2022-06-03 DIAGNOSIS — Z89.421 ACQUIRED ABSENCE OF OTHER RIGHT TOE(S): ICD-10-CM

## 2022-06-03 DIAGNOSIS — Z79.82 LONG TERM (CURRENT) USE OF ASPIRIN: ICD-10-CM

## 2022-06-03 DIAGNOSIS — E11.51 TYPE 2 DIABETES MELLITUS WITH DIABETIC PERIPHERAL ANGIOPATHY WITHOUT GANGRENE: ICD-10-CM

## 2022-06-03 DIAGNOSIS — Z86.16 PERSONAL HISTORY OF COVID-19: ICD-10-CM

## 2022-06-03 DIAGNOSIS — Z79.4 LONG TERM (CURRENT) USE OF INSULIN: ICD-10-CM

## 2022-06-03 DIAGNOSIS — Z83.3 FAMILY HISTORY OF DIABETES MELLITUS: ICD-10-CM

## 2022-06-03 DIAGNOSIS — Z87.81 PERSONAL HISTORY OF (HEALED) TRAUMATIC FRACTURE: ICD-10-CM

## 2022-06-03 DIAGNOSIS — Z85.828 PERSONAL HISTORY OF OTHER MALIGNANT NEOPLASM OF SKIN: ICD-10-CM

## 2022-06-03 DIAGNOSIS — Z80.8 FAMILY HISTORY OF MALIGNANT NEOPLASM OF OTHER ORGANS OR SYSTEMS: ICD-10-CM

## 2022-06-03 DIAGNOSIS — Z86.718 PERSONAL HISTORY OF OTHER VENOUS THROMBOSIS AND EMBOLISM: ICD-10-CM

## 2022-06-03 DIAGNOSIS — L84 CORNS AND CALLOSITIES: ICD-10-CM

## 2022-06-03 DIAGNOSIS — Z89.422 ACQUIRED ABSENCE OF OTHER LEFT TOE(S): ICD-10-CM

## 2022-06-03 DIAGNOSIS — Z79.899 OTHER LONG TERM (CURRENT) DRUG THERAPY: ICD-10-CM

## 2022-06-06 ENCOUNTER — APPOINTMENT (OUTPATIENT)
Dept: HYPERBARIC MEDICINE | Facility: HOSPITAL | Age: 57
End: 2022-06-06

## 2022-06-06 NOTE — H&P ADULT - SKIN
Sent in prescription for:     Requested Prescriptions     Signed Prescriptions Disp Refills    rizatriptan (MAXALT) 10 MG tablet 30 tablet 12     Sig: Take 1 tablet by mouth once as needed for Migraine     Authorizing Provider: Abril Neil erythema/swelling detailed exam warm and dry/erythema/swelling

## 2022-06-07 ENCOUNTER — APPOINTMENT (OUTPATIENT)
Dept: HYPERBARIC MEDICINE | Facility: HOSPITAL | Age: 57
End: 2022-06-07

## 2022-06-08 ENCOUNTER — APPOINTMENT (OUTPATIENT)
Dept: HYPERBARIC MEDICINE | Facility: HOSPITAL | Age: 57
End: 2022-06-08

## 2022-06-09 ENCOUNTER — APPOINTMENT (OUTPATIENT)
Dept: HYPERBARIC MEDICINE | Facility: HOSPITAL | Age: 57
End: 2022-06-09

## 2022-06-10 ENCOUNTER — APPOINTMENT (OUTPATIENT)
Dept: HYPERBARIC MEDICINE | Facility: HOSPITAL | Age: 57
End: 2022-06-10
Payer: MEDICARE

## 2022-06-10 ENCOUNTER — OUTPATIENT (OUTPATIENT)
Dept: OUTPATIENT SERVICES | Facility: HOSPITAL | Age: 57
LOS: 1 days | Discharge: HOME | End: 2022-06-10
Payer: MEDICARE

## 2022-06-10 ENCOUNTER — APPOINTMENT (OUTPATIENT)
Dept: WOUND CARE | Facility: HOSPITAL | Age: 57
End: 2022-06-10
Payer: MEDICARE

## 2022-06-10 VITALS
DIASTOLIC BLOOD PRESSURE: 74 MMHG | HEIGHT: 73 IN | WEIGHT: 300 LBS | TEMPERATURE: 99 F | BODY MASS INDEX: 39.76 KG/M2 | RESPIRATION RATE: 20 BRPM | HEART RATE: 57 BPM | SYSTOLIC BLOOD PRESSURE: 151 MMHG | OXYGEN SATURATION: 97 %

## 2022-06-10 DIAGNOSIS — E11.621 TYPE 2 DIABETES MELLITUS WITH FOOT ULCER: ICD-10-CM

## 2022-06-10 DIAGNOSIS — N18.6 END STAGE RENAL DISEASE: Chronic | ICD-10-CM

## 2022-06-10 DIAGNOSIS — Z89.421 ACQUIRED ABSENCE OF OTHER RIGHT TOE(S): Chronic | ICD-10-CM

## 2022-06-10 DIAGNOSIS — Z94.0 KIDNEY TRANSPLANT STATUS: Chronic | ICD-10-CM

## 2022-06-10 PROCEDURE — 99024 POSTOP FOLLOW-UP VISIT: CPT

## 2022-06-10 PROCEDURE — G0463: CPT

## 2022-06-10 NOTE — HISTORY OF PRESENT ILLNESS
[FreeTextEntry1] : DFU 3 lateral head 5th met right almost closed , plantar base 5th metatarsal clean granular and smaller - soi

## 2022-06-10 NOTE — ASSESSMENT
[Verbal] : Verbal [Demo] : Demo [Patient] : Patient [Family member] : Family member [Good - alert, interested, motivated] : Good - alert, interested, motivated [Verbalizes knowledge/Understanding] : Verbalizes knowledge/understanding [Dressing changes] : dressing changes [Foot Care] : foot care [Skin Care] : skin care [Pressure relief] : pressure relief [Signs and symptoms of infection] : sign and symptoms of infection [How and When to Call] : how and when to call [Hyperbaric Therapy] : hyperbaric therapy [Off-loading] : off-loading [Patient responsibility to plan of care] : patient responsibility to plan of care [Stable] : stable [Home] : Home [Ambulatory] : Ambulatory [] : No [FreeTextEntry2] : Alteration in skin integrity- promote optimal skin integrity\par  [FreeTextEntry4] : Dr Araiza\par Pt states he had fluid removed from his Right Lung (Pleural Effusion) last Friday, 06/03/22 at [facility] by Pulmonologist (Davidson)- pt states that diuretics that had been prescribed by Dr Boyce (pt states he does not know name of) were d/c'd by Dr Boyce on that day- pt states he has follow up to Dr Boyce next Thursday, 06/16/22 & may possibly receive letter of clearance at that time for HBOT\par Pt also states he has follow up with Cardiologist (Dr Martinez) on 07/14/22 & with Dr Saucedo (ID) on 07/15/22- Dr Araiza aware\par Amoxicillin inprogress\par F/U to [facility] in 1 week

## 2022-06-10 NOTE — PHYSICAL EXAM
[0] : left 0 [1+] : left 1+ [Varicose Veins Of Lower Extremities] : not present [] : not present [Purpura] : no purpura  [Petechiae] : no petechiae [Skin Ulcer] : ulcer [Skin Induration] : no induration [Alert] : alert [Oriented to Person] : oriented to person [Oriented to Place] : oriented to place [Oriented to Time] : oriented to time [Calm] : calm [de-identified] : adult WM, NAD, alert, Ox 3. [de-identified] : Diabetic Charcot right foot deformity [de-identified] : right foot plantar ulcer 5th metatarsal healing well sutures removed , head of 5th metatarsal healing well , smaller and closing  [de-identified] : Diabetic neuropathy  [FreeTextEntry1] : Right Foot Lateral 5th Met (s/p 5th Met Resection 05/11/22) - proximal- Dry Eschar- eschar lifted by Dr Araiza- very small open area measured below [FreeTextEntry2] : 0.2 [FreeTextEntry3] : 0.2 [FreeTextEntry4] : 0.1 [de-identified] : Intact [de-identified] : Silver Alginate/ Dry Dressing & Kerlix [de-identified] : Mechanically cleansed with Sterile Gauze & Normal saline\par  [FreeTextEntry7] : Right Plantar Foot  [FreeTextEntry8] : 1.9 [FreeTextEntry9] : 2.2 [de-identified] : 0.2 [de-identified] : Small Serosanguineous [de-identified] : Intact/ callus- callus shaved by Dr Araiza [de-identified] : Silver Alginate/ Dry Dressing & Kerlix [de-identified] : Mechanically cleansed with Sterile Gauze & Normal saline\par  [de-identified] : Right Lateral Distal Foot- (s/p 5th Met Resection 05/11/22)  [de-identified] : 0.4 [de-identified] : 1.5 [de-identified] : 0.1 [de-identified] : Small Serosanguineous [de-identified] : Intact [de-identified] : Silver Alginate/ Dry Dressing & Kerlix [de-identified] : Mechanically cleansed with Sterile Gauze & Normal saline\par  [TWNoteComboBox4] : None [de-identified] : None [de-identified] : None [de-identified] : 100% [de-identified] : No [de-identified] : None [de-identified] : None [de-identified] : 100% [de-identified] : No [de-identified] : None [de-identified] : None [de-identified] : 100% [de-identified] : No

## 2022-06-10 NOTE — PLAN
[FreeTextEntry1] : continue wound care and off loading , await clearance from pulmonary before HBOT Spent 20 minutes for patient care and medical decision making.\par

## 2022-06-10 NOTE — REVIEW OF SYSTEMS
[Fever] : no fever [Chills] : no chills [Eye Pain] : no eye pain [Loss Of Hearing] : no hearing loss [Shortness Of Breath] : no shortness of breath [Abdominal Pain] : no abdominal pain [Vomiting] : no vomiting [Skin Lesions] : no skin lesions [Skin Wound] : no skin wound [Anxiety] : no anxiety [Easy Bleeding] : no tendency for easy bleeding [FreeTextEntry5] : HTN,HLD [FreeTextEntry7] : 40.9 BMI , morbid obesity  [FreeTextEntry8] : Kidney Transplant  [FreeTextEntry9] : Associated Diabetic Charcot foot  [de-identified] : plantar right foot midfoot , lateral  DFU 2  , large area of peripheral callus build up , the ulcer is clean and granular s/p surgical intervention , sutures intact - soi  [de-identified] : IDDM with neuropathy  [de-identified] : MARILUZ

## 2022-06-10 NOTE — VITALS
[] : No [de-identified] : Pt denies c/o any pains or discomforts at present [FreeTextEntry5] : Pt states diuretic prescribed by Pulmonologist was d/c'd last Friday, 06/03/22- Dr Araiza aware

## 2022-06-12 DIAGNOSIS — Z87.81 PERSONAL HISTORY OF (HEALED) TRAUMATIC FRACTURE: ICD-10-CM

## 2022-06-12 DIAGNOSIS — Z89.421 ACQUIRED ABSENCE OF OTHER RIGHT TOE(S): ICD-10-CM

## 2022-06-12 DIAGNOSIS — E11.51 TYPE 2 DIABETES MELLITUS WITH DIABETIC PERIPHERAL ANGIOPATHY WITHOUT GANGRENE: ICD-10-CM

## 2022-06-12 DIAGNOSIS — Z79.4 LONG TERM (CURRENT) USE OF INSULIN: ICD-10-CM

## 2022-06-12 DIAGNOSIS — Z80.3 FAMILY HISTORY OF MALIGNANT NEOPLASM OF BREAST: ICD-10-CM

## 2022-06-12 DIAGNOSIS — G47.33 OBSTRUCTIVE SLEEP APNEA (ADULT) (PEDIATRIC): ICD-10-CM

## 2022-06-12 DIAGNOSIS — Z86.718 PERSONAL HISTORY OF OTHER VENOUS THROMBOSIS AND EMBOLISM: ICD-10-CM

## 2022-06-12 DIAGNOSIS — Z80.8 FAMILY HISTORY OF MALIGNANT NEOPLASM OF OTHER ORGANS OR SYSTEMS: ICD-10-CM

## 2022-06-12 DIAGNOSIS — E78.5 HYPERLIPIDEMIA, UNSPECIFIED: ICD-10-CM

## 2022-06-12 DIAGNOSIS — Z94.0 KIDNEY TRANSPLANT STATUS: ICD-10-CM

## 2022-06-12 DIAGNOSIS — Z86.16 PERSONAL HISTORY OF COVID-19: ICD-10-CM

## 2022-06-12 DIAGNOSIS — E66.01 MORBID (SEVERE) OBESITY DUE TO EXCESS CALORIES: ICD-10-CM

## 2022-06-12 DIAGNOSIS — L97.412 NON-PRESSURE CHRONIC ULCER OF RIGHT HEEL AND MIDFOOT WITH FAT LAYER EXPOSED: ICD-10-CM

## 2022-06-12 DIAGNOSIS — Z83.3 FAMILY HISTORY OF DIABETES MELLITUS: ICD-10-CM

## 2022-06-12 DIAGNOSIS — Z86.73 PERSONAL HISTORY OF TRANSIENT ISCHEMIC ATTACK (TIA), AND CEREBRAL INFARCTION WITHOUT RESIDUAL DEFICITS: ICD-10-CM

## 2022-06-12 DIAGNOSIS — L84 CORNS AND CALLOSITIES: ICD-10-CM

## 2022-06-12 DIAGNOSIS — E11.621 TYPE 2 DIABETES MELLITUS WITH FOOT ULCER: ICD-10-CM

## 2022-06-12 DIAGNOSIS — Z79.82 LONG TERM (CURRENT) USE OF ASPIRIN: ICD-10-CM

## 2022-06-12 DIAGNOSIS — Z89.422 ACQUIRED ABSENCE OF OTHER LEFT TOE(S): ICD-10-CM

## 2022-06-12 DIAGNOSIS — Z79.899 OTHER LONG TERM (CURRENT) DRUG THERAPY: ICD-10-CM

## 2022-06-12 DIAGNOSIS — E11.610 TYPE 2 DIABETES MELLITUS WITH DIABETIC NEUROPATHIC ARTHROPATHY: ICD-10-CM

## 2022-06-12 DIAGNOSIS — Z85.828 PERSONAL HISTORY OF OTHER MALIGNANT NEOPLASM OF SKIN: ICD-10-CM

## 2022-06-13 ENCOUNTER — APPOINTMENT (OUTPATIENT)
Dept: HYPERBARIC MEDICINE | Facility: HOSPITAL | Age: 57
End: 2022-06-13

## 2022-06-14 ENCOUNTER — APPOINTMENT (OUTPATIENT)
Dept: HYPERBARIC MEDICINE | Facility: HOSPITAL | Age: 57
End: 2022-06-14

## 2022-06-15 ENCOUNTER — APPOINTMENT (OUTPATIENT)
Dept: HYPERBARIC MEDICINE | Facility: HOSPITAL | Age: 57
End: 2022-06-15

## 2022-06-16 ENCOUNTER — NON-APPOINTMENT (OUTPATIENT)
Age: 57
End: 2022-06-16

## 2022-06-16 ENCOUNTER — APPOINTMENT (OUTPATIENT)
Dept: HYPERBARIC MEDICINE | Facility: HOSPITAL | Age: 57
End: 2022-06-16

## 2022-06-17 ENCOUNTER — APPOINTMENT (OUTPATIENT)
Dept: HYPERBARIC MEDICINE | Facility: HOSPITAL | Age: 57
End: 2022-06-17

## 2022-06-17 ENCOUNTER — APPOINTMENT (OUTPATIENT)
Dept: WOUND CARE | Facility: HOSPITAL | Age: 57
End: 2022-06-17
Payer: MEDICARE

## 2022-06-17 ENCOUNTER — OUTPATIENT (OUTPATIENT)
Dept: OUTPATIENT SERVICES | Facility: HOSPITAL | Age: 57
LOS: 1 days | Discharge: HOME | End: 2022-06-17
Payer: MEDICARE

## 2022-06-17 ENCOUNTER — NON-APPOINTMENT (OUTPATIENT)
Age: 57
End: 2022-06-17

## 2022-06-17 VITALS
RESPIRATION RATE: 20 BRPM | TEMPERATURE: 97.7 F | DIASTOLIC BLOOD PRESSURE: 79 MMHG | WEIGHT: 300 LBS | HEART RATE: 61 BPM | SYSTOLIC BLOOD PRESSURE: 172 MMHG | HEIGHT: 73 IN | BODY MASS INDEX: 39.76 KG/M2 | OXYGEN SATURATION: 97 %

## 2022-06-17 DIAGNOSIS — Z89.421 ACQUIRED ABSENCE OF OTHER RIGHT TOE(S): Chronic | ICD-10-CM

## 2022-06-17 DIAGNOSIS — E11.621 TYPE 2 DIABETES MELLITUS WITH FOOT ULCER: ICD-10-CM

## 2022-06-17 DIAGNOSIS — N18.6 END STAGE RENAL DISEASE: Chronic | ICD-10-CM

## 2022-06-17 DIAGNOSIS — Z94.0 KIDNEY TRANSPLANT STATUS: Chronic | ICD-10-CM

## 2022-06-17 LAB — SARS-COV-2 RNA SPEC QL NAA+PROBE: SIGNIFICANT CHANGE UP

## 2022-06-17 PROCEDURE — 87635 SARS-COV-2 COVID-19 AMP PRB: CPT

## 2022-06-17 PROCEDURE — G0463: CPT

## 2022-06-17 PROCEDURE — 99024 POSTOP FOLLOW-UP VISIT: CPT

## 2022-06-17 NOTE — ASSESSMENT
[Verbal] : Verbal [Written] : Written [Demo] : Demo [Patient] : Patient [Good - alert, interested, motivated] : Good - alert, interested, motivated [Demonstrates independently] : demonstrates independently [Dressing changes] : dressing changes [Foot Care] : foot care [Skin Care] : skin care [Signs and symptoms of infection] : sign and symptoms of infection [Nutrition] : nutrition [How and When to Call] : how and when to call [Off-loading] : off-loading [Compression Therapy] : compression therapy [Patient responsibility to plan of care] : patient responsibility to plan of care [Glycemic Control] : glycemic control [] : Yes [Stable] : stable [Home] : Home [Ambulatory] : Ambulatory [Not Applicable - Long Term Care/Home Health Agency] : Long Term Care/Home Health Agency: Not Applicable [FreeTextEntry2] : Infection prevention \par Wound care (dressing changes)\par Maintain optimal skin integrity to high pressure areas\par Compression therapy\par Nutrition and wound healing\par Elevation and low sodium compliance.\par Pressure relief/ Pressure redistribution\par Offloading the stress on skin structures and decreasing potential pathologic biomechanical influences.\par HBOT [FreeTextEntry4] : COVID 19 PCR swab obtained and sent to lab as STAT. \par Pulmonary clearance received. Reviewed by DPM. \par Pt to start HBOT tomorrow @ 1000 AM pending (-) covid swab\par Pt to f/u tomorrow for HBOT. Permanent Monday - Fri chamber (0830 chamber for a 0800 am arrival). \par Biweekly assessments.\par \par

## 2022-06-17 NOTE — PLAN
[FreeTextEntry1] : Patient has been cleared for HBOT , this will help accelerate healing , continue off loading and wound care   Spent 20 minutes for patient care and medical decision making.\par

## 2022-06-17 NOTE — PHYSICAL EXAM
[4 x 4] : 4 x 4  [Abdominal Pad] : Abdominal Pad [0] : left 0 [1+] : left 1+ [Varicose Veins Of Lower Extremities] : not present [] : not present [Purpura] : no purpura  [Petechiae] : no petechiae [Skin Ulcer] : ulcer [Skin Induration] : no induration [Alert] : alert [Oriented to Person] : oriented to person [Oriented to Place] : oriented to place [Oriented to Time] : oriented to time [Calm] : calm [de-identified] : adult WM, NAD, alert, Ox 3. [de-identified] : Diabetic Charcot right foot deformity [de-identified] : right foot plantar ulcer 5th metatarsal healing , head of 5th metatarsal healing well , smaller and closing  [de-identified] : Diabetic neuropathy  [FreeTextEntry1] : Right Foot Lateral 5th Met (s/p 5th Met Resection 05/11/22) - proximal- Dry Eschar- eschar lifted by Dr Araiza- very small open area measured below [FreeTextEntry2] : 0.5 [FreeTextEntry3] : 0.4 [FreeTextEntry4] : 0.1 [de-identified] : Callused - DPM shaved callus [de-identified] : Pt expressed comfort post ACE application. Circ/Neuromuscular functions WNL post application. [de-identified] : Alginate Ag [de-identified] : Mechanically cleansed with sterile gauze and normal saline 0.9%\par Dry Dressing\par  [FreeTextEntry7] : Right Plantar Foot  [FreeTextEntry8] : 2.1 [FreeTextEntry9] : 2.5 [de-identified] : 0.2 [de-identified] : Moderate serosanguineous [de-identified] : Intact/ callus- callus shaved by Dr Araiza [de-identified] : Alginate Ag [de-identified] : Mechanically cleansed with sterile gauze and normal saline 0.9%\par Dry Dressing\par  [de-identified] : Right Lateral Distal Foot- (s/p 5th Met Resection 05/11/22)  [de-identified] : 1.5 [de-identified] : 0.6 [de-identified] : 0.1 [de-identified] : Small Serosanguineous [de-identified] : Callused with mild maceration [de-identified] : Pt expressed comfort post ACE application. Circ/Neuromuscular functions WNL post application. [de-identified] : Alginate Ag [de-identified] : Mechanically cleansed with sterile gauze and normal saline 0.9%\par Dry Dressing\par  [TWNoteComboBox4] : None [de-identified] : None [de-identified] : None [de-identified] : 100% [de-identified] : No [de-identified] : Ace wraps [de-identified] : None [de-identified] : None [de-identified] : 100% [de-identified] : No [de-identified] : 3x Weekly [de-identified] : Primary Dressing [de-identified] : Diabetic [de-identified] : None [de-identified] : None [de-identified] : 100% [de-identified] : No [de-identified] : Ace wraps [de-identified] : 3x Weekly [de-identified] : Primary Dressing

## 2022-06-17 NOTE — HISTORY OF PRESENT ILLNESS
[FreeTextEntry1] : DFU plantar base 5th metatarsal right foot , smaller , clean , the lateral surgical wounds are almost closed , they are clean , smaller and granular

## 2022-06-17 NOTE — REVIEW OF SYSTEMS
[Fever] : no fever [Chills] : no chills [Eye Pain] : no eye pain [Loss Of Hearing] : no hearing loss [Shortness Of Breath] : no shortness of breath [Abdominal Pain] : no abdominal pain [Vomiting] : no vomiting [Skin Lesions] : no skin lesions [Skin Wound] : no skin wound [Anxiety] : no anxiety [Easy Bleeding] : no tendency for easy bleeding [FreeTextEntry5] : HTN,HLD [FreeTextEntry7] : 40.9 BMI , morbid obesity  [FreeTextEntry8] : Kidney Transplant  [FreeTextEntry9] : Associated Diabetic Charcot foot  [de-identified] : plantar right foot midfoot , lateral  DFU 2  , large area of peripheral callus build up , the ulcer is clean and granular s/p surgical intervention , sutures intact - soi  [de-identified] : IDDM with neuropathy  [de-identified] : MARILUZ

## 2022-06-18 ENCOUNTER — OUTPATIENT (OUTPATIENT)
Dept: OUTPATIENT SERVICES | Facility: HOSPITAL | Age: 57
LOS: 1 days | Discharge: HOME | End: 2022-06-18
Payer: MEDICARE

## 2022-06-18 ENCOUNTER — APPOINTMENT (OUTPATIENT)
Dept: HYPERBARIC MEDICINE | Facility: HOSPITAL | Age: 57
End: 2022-06-18
Payer: MEDICARE

## 2022-06-18 VITALS
OXYGEN SATURATION: 98 % | RESPIRATION RATE: 20 BRPM | HEART RATE: 54 BPM | SYSTOLIC BLOOD PRESSURE: 167 MMHG | TEMPERATURE: 98 F | DIASTOLIC BLOOD PRESSURE: 87 MMHG

## 2022-06-18 VITALS
OXYGEN SATURATION: 95 % | TEMPERATURE: 99.1 F | HEART RATE: 57 BPM | RESPIRATION RATE: 20 BRPM | SYSTOLIC BLOOD PRESSURE: 172 MMHG | DIASTOLIC BLOOD PRESSURE: 87 MMHG

## 2022-06-18 VITALS — HEART RATE: 58 BPM | DIASTOLIC BLOOD PRESSURE: 79 MMHG | SYSTOLIC BLOOD PRESSURE: 143 MMHG

## 2022-06-18 DIAGNOSIS — Z89.421 ACQUIRED ABSENCE OF OTHER RIGHT TOE(S): Chronic | ICD-10-CM

## 2022-06-18 DIAGNOSIS — Z94.0 KIDNEY TRANSPLANT STATUS: Chronic | ICD-10-CM

## 2022-06-18 DIAGNOSIS — N18.6 END STAGE RENAL DISEASE: Chronic | ICD-10-CM

## 2022-06-18 DIAGNOSIS — E11.621 TYPE 2 DIABETES MELLITUS WITH FOOT ULCER: ICD-10-CM

## 2022-06-18 PROCEDURE — 82962 GLUCOSE BLOOD TEST: CPT

## 2022-06-18 PROCEDURE — 99183 HYPERBARIC OXYGEN THERAPY: CPT

## 2022-06-18 PROCEDURE — G0277: CPT

## 2022-06-19 DIAGNOSIS — Z87.81 PERSONAL HISTORY OF (HEALED) TRAUMATIC FRACTURE: ICD-10-CM

## 2022-06-19 DIAGNOSIS — Z94.0 KIDNEY TRANSPLANT STATUS: ICD-10-CM

## 2022-06-19 DIAGNOSIS — Z89.421 ACQUIRED ABSENCE OF OTHER RIGHT TOE(S): ICD-10-CM

## 2022-06-19 DIAGNOSIS — L97.412 NON-PRESSURE CHRONIC ULCER OF RIGHT HEEL AND MIDFOOT WITH FAT LAYER EXPOSED: ICD-10-CM

## 2022-06-19 DIAGNOSIS — Z79.82 LONG TERM (CURRENT) USE OF ASPIRIN: ICD-10-CM

## 2022-06-19 DIAGNOSIS — Z86.718 PERSONAL HISTORY OF OTHER VENOUS THROMBOSIS AND EMBOLISM: ICD-10-CM

## 2022-06-19 DIAGNOSIS — E11.51 TYPE 2 DIABETES MELLITUS WITH DIABETIC PERIPHERAL ANGIOPATHY WITHOUT GANGRENE: ICD-10-CM

## 2022-06-19 DIAGNOSIS — E11.621 TYPE 2 DIABETES MELLITUS WITH FOOT ULCER: ICD-10-CM

## 2022-06-19 DIAGNOSIS — Z79.4 LONG TERM (CURRENT) USE OF INSULIN: ICD-10-CM

## 2022-06-19 DIAGNOSIS — Z80.8 FAMILY HISTORY OF MALIGNANT NEOPLASM OF OTHER ORGANS OR SYSTEMS: ICD-10-CM

## 2022-06-19 DIAGNOSIS — Z79.899 OTHER LONG TERM (CURRENT) DRUG THERAPY: ICD-10-CM

## 2022-06-19 DIAGNOSIS — Z80.3 FAMILY HISTORY OF MALIGNANT NEOPLASM OF BREAST: ICD-10-CM

## 2022-06-19 DIAGNOSIS — Z86.73 PERSONAL HISTORY OF TRANSIENT ISCHEMIC ATTACK (TIA), AND CEREBRAL INFARCTION WITHOUT RESIDUAL DEFICITS: ICD-10-CM

## 2022-06-19 DIAGNOSIS — L84 CORNS AND CALLOSITIES: ICD-10-CM

## 2022-06-19 DIAGNOSIS — Z89.422 ACQUIRED ABSENCE OF OTHER LEFT TOE(S): ICD-10-CM

## 2022-06-19 DIAGNOSIS — Z20.822 CONTACT WITH AND (SUSPECTED) EXPOSURE TO COVID-19: ICD-10-CM

## 2022-06-19 DIAGNOSIS — E78.5 HYPERLIPIDEMIA, UNSPECIFIED: ICD-10-CM

## 2022-06-19 DIAGNOSIS — Z83.3 FAMILY HISTORY OF DIABETES MELLITUS: ICD-10-CM

## 2022-06-19 DIAGNOSIS — Z85.828 PERSONAL HISTORY OF OTHER MALIGNANT NEOPLASM OF SKIN: ICD-10-CM

## 2022-06-19 DIAGNOSIS — G47.33 OBSTRUCTIVE SLEEP APNEA (ADULT) (PEDIATRIC): ICD-10-CM

## 2022-06-19 DIAGNOSIS — E11.610 TYPE 2 DIABETES MELLITUS WITH DIABETIC NEUROPATHIC ARTHROPATHY: ICD-10-CM

## 2022-06-19 DIAGNOSIS — Z86.16 PERSONAL HISTORY OF COVID-19: ICD-10-CM

## 2022-06-19 DIAGNOSIS — E66.01 MORBID (SEVERE) OBESITY DUE TO EXCESS CALORIES: ICD-10-CM

## 2022-06-20 ENCOUNTER — OUTPATIENT (OUTPATIENT)
Dept: OUTPATIENT SERVICES | Facility: HOSPITAL | Age: 57
LOS: 1 days | Discharge: HOME | End: 2022-06-20
Payer: MEDICARE

## 2022-06-20 ENCOUNTER — APPOINTMENT (OUTPATIENT)
Dept: HYPERBARIC MEDICINE | Facility: HOSPITAL | Age: 57
End: 2022-06-20

## 2022-06-20 VITALS
DIASTOLIC BLOOD PRESSURE: 73 MMHG | OXYGEN SATURATION: 97 % | TEMPERATURE: 97 F | SYSTOLIC BLOOD PRESSURE: 123 MMHG | HEART RATE: 81 BPM | RESPIRATION RATE: 20 BRPM

## 2022-06-20 VITALS
SYSTOLIC BLOOD PRESSURE: 155 MMHG | HEART RATE: 57 BPM | TEMPERATURE: 97.9 F | OXYGEN SATURATION: 95 % | DIASTOLIC BLOOD PRESSURE: 78 MMHG | RESPIRATION RATE: 20 BRPM

## 2022-06-20 DIAGNOSIS — L97.412 NON-PRESSURE CHRONIC ULCER OF RIGHT HEEL AND MIDFOOT WITH FAT LAYER EXPOSED: ICD-10-CM

## 2022-06-20 DIAGNOSIS — Z94.0 KIDNEY TRANSPLANT STATUS: Chronic | ICD-10-CM

## 2022-06-20 DIAGNOSIS — E11.621 TYPE 2 DIABETES MELLITUS WITH FOOT ULCER: ICD-10-CM

## 2022-06-20 DIAGNOSIS — Z89.421 ACQUIRED ABSENCE OF OTHER RIGHT TOE(S): Chronic | ICD-10-CM

## 2022-06-20 DIAGNOSIS — N18.6 END STAGE RENAL DISEASE: Chronic | ICD-10-CM

## 2022-06-20 DIAGNOSIS — Z79.4 LONG TERM (CURRENT) USE OF INSULIN: ICD-10-CM

## 2022-06-20 PROCEDURE — 99183 HYPERBARIC OXYGEN THERAPY: CPT

## 2022-06-20 PROCEDURE — 82962 GLUCOSE BLOOD TEST: CPT

## 2022-06-20 PROCEDURE — G0277: CPT

## 2022-06-20 NOTE — ADDENDUM
[FreeTextEntry1] : Patients Order Verified Prior to Treatment. \par Patients Secondary Contraindication Examination Check Preformed by CHT. \par Patients Contraindication List and Pre-Check List, Verified and Signed by Technician and CHT Prior to Patients Treatment. \par Patients Legs Elevated via Double Leg Wedge to Offload Patients Wound.\par Patient descended to 2.4 DEEPAK @ 2.2 PSI/MIN without incident in chamber #3.\par Patient resting comfortably @ depth, equal chest rise observed throughout treatment.\par Patient tolerated both air breaks well.\par Patient ascended from 2.4 DEEPAK @ 2.2 PSI/MIN without incident in chamber #3.\par Patient tolerated treatment well.\par Patient Exited the Hyperbaric Suite Safely\par Patients Dressing Changed Prior to Hyperbaric Treatment by Nurse. \par ace applied post tx by nurse. \par \par \par

## 2022-06-20 NOTE — PROCEDURE
[Outpatient] : Outpatient [Ambulatory] : Patient is ambulatory. [THIS CHAMBER HAS BEEN CLEANED / DISINFECTED] : This chamber has been cleaned / disinfected according to local and hospital policy and procedure prior to this treatment. [Patient demonstrated and verbalized proper technique for using air break mask] : Patient demonstrated and verbalized proper technique for using air break mask [Patient educated on the risks of SMOKING prior to HBOT with understanding] : Patient educated on the risks of SMOKING prior to HBOT with understanding [Patient educated on the risks of CONSUMING ALCOHOL prior to HBOT with understanding] : Patient educated on the risks of CONSUMING ALCOHOL prior to HBOT with understanding [100% Cotton] : 100% cotton [Empty all pockets] : empty all pockets [No hair oils, wigs, hairpieces, pins] : no hair oils, wigs, hairpieces, pins  [Pre tx medications] : pre tx medications  [No make-up, creams] : no make-up, creams  [No jewelry] : no jewelry  [No matches, cigarettes, lighters] : no matches, cigarettes, lighters  [Hearing aid removed] : hearing aid removed [Dentures removed] : dentures removed [Ground bracelet on pt's wrist] : ground bracelet on pt's wrist  [Contacts removed] : contacts removed  [Remove nail polish] : remove nail polish  [No reading material] : no reading material  [Bra, undergarments removed] : bra, undergarments removed  [No contraindicated dressings] : no contraindicated dressings [Ground Wire - VISUAL Verification - Intact/Free of Obstruction] : Ground Wire - VISUAL Verification - Intact/Free of Obstruction  [Ground Continuity - Verified < 1 ohm w/ Wrist Strap Gatito] : Ground Continuity - Verified < 1 ohm w/ Wrist Strap Gatito [Number: ___] : Number: [unfilled] [Diagnosis: ___] : Diagnosis: [unfilled] [____] : Post-Dive: Time - [unfilled] [___] : Post-Dive: Value - [unfilled] mg/dL [Clear all fields] : clear all fields [] : No [FreeTextEntry4] : 100 [FreeTextEntry6] : 1036 [FreeTextEntry8] : 8351 [de-identified] : 9666 [de-identified] : 9120 [de-identified] : 0913 [de-identified] : 0273 [de-identified] : 1005 [de-identified] : 1019 [de-identified] : 120mins

## 2022-06-21 ENCOUNTER — OUTPATIENT (OUTPATIENT)
Dept: OUTPATIENT SERVICES | Facility: HOSPITAL | Age: 57
LOS: 1 days | Discharge: HOME | End: 2022-06-21
Payer: MEDICARE

## 2022-06-21 ENCOUNTER — APPOINTMENT (OUTPATIENT)
Dept: HYPERBARIC MEDICINE | Facility: HOSPITAL | Age: 57
End: 2022-06-21

## 2022-06-21 VITALS
TEMPERATURE: 97.3 F | RESPIRATION RATE: 18 BRPM | HEART RATE: 52 BPM | DIASTOLIC BLOOD PRESSURE: 74 MMHG | OXYGEN SATURATION: 98 % | SYSTOLIC BLOOD PRESSURE: 126 MMHG

## 2022-06-21 VITALS
OXYGEN SATURATION: 97 % | SYSTOLIC BLOOD PRESSURE: 120 MMHG | RESPIRATION RATE: 20 BRPM | DIASTOLIC BLOOD PRESSURE: 71 MMHG | TEMPERATURE: 97.3 F | HEART RATE: 59 BPM

## 2022-06-21 DIAGNOSIS — E11.621 TYPE 2 DIABETES MELLITUS WITH FOOT ULCER: ICD-10-CM

## 2022-06-21 DIAGNOSIS — Z94.0 KIDNEY TRANSPLANT STATUS: Chronic | ICD-10-CM

## 2022-06-21 DIAGNOSIS — N18.6 END STAGE RENAL DISEASE: Chronic | ICD-10-CM

## 2022-06-21 DIAGNOSIS — Z89.421 ACQUIRED ABSENCE OF OTHER RIGHT TOE(S): Chronic | ICD-10-CM

## 2022-06-21 PROCEDURE — G0277: CPT

## 2022-06-21 PROCEDURE — 99183 HYPERBARIC OXYGEN THERAPY: CPT

## 2022-06-21 PROCEDURE — 82962 GLUCOSE BLOOD TEST: CPT

## 2022-06-21 NOTE — PROCEDURE
[Outpatient] : Outpatient [Ambulatory] : Patient is ambulatory. [THIS CHAMBER HAS BEEN CLEANED / DISINFECTED] : This chamber has been cleaned / disinfected according to local and hospital policy and procedure prior to this treatment. [Patient demonstrated and verbalized proper technique for using air break mask] : Patient demonstrated and verbalized proper technique for using air break mask [Patient educated on the risks of SMOKING prior to HBOT with understanding] : Patient educated on the risks of SMOKING prior to HBOT with understanding [Patient educated on the risks of CONSUMING ALCOHOL prior to HBOT with understanding] : Patient educated on the risks of CONSUMING ALCOHOL prior to HBOT with understanding [100% Cotton] : 100% cotton [Empty all pockets] : empty all pockets [No hair oils, wigs, hairpieces, pins] : no hair oils, wigs, hairpieces, pins  [Pre tx medications] : pre tx medications  [No make-up, creams] : no make-up, creams  [No jewelry] : no jewelry  [No matches, cigarettes, lighters] : no matches, cigarettes, lighters  [Hearing aid removed] : hearing aid removed [Dentures removed] : dentures removed [Ground bracelet on pt's wrist] : ground bracelet on pt's wrist  [Contacts removed] : contacts removed  [Remove nail polish] : remove nail polish  [No reading material] : no reading material  [Bra, undergarments removed] : bra, undergarments removed  [No contraindicated dressings] : no contraindicated dressings [Ground Wire - VISUAL Verification - Intact/Free of Obstruction] : Ground Wire - VISUAL Verification - Intact/Free of Obstruction  [Ground Continuity - Verified < 1 ohm w/ Wrist Strap Gatito] : Ground Continuity - Verified < 1 ohm w/ Wrist Strap Gatito [Number: ___] : Number: [unfilled] [Diagnosis: ___] : Diagnosis: [unfilled] [____] : Post-Dive: Time - [unfilled] [___] : Post-Dive: Value - [unfilled] mg/dL [Clear all fields] : clear all fields [] : No [FreeTextEntry4] : 100 [FreeTextEntry6] : 3316 [FreeTextEntry8] : 8165 [de-identified] : 1038 [de-identified] : 1047 [de-identified] : 1118 [de-identified] : 3890 [de-identified] : 6543 [de-identified] : 5554 [de-identified] : 120mins [de-identified] : WIN

## 2022-06-21 NOTE — ADDENDUM
[FreeTextEntry1] : Patients Dressing Changed Prior to Hyperbaric Treatment by Nurse. \par Patients Order Verified Prior to Treatment. \par Patients Secondary Contraindication Examination Check Preformed by CHT. \par Patients Contraindication List and Pre-Check List, Verified and Signed by Technician and CHT Prior to Patients Treatment. \par Patients back Elevated via Double Leg Wedge for patients comfort.\par Patient descended to 2.4 DEEPAK @ 2.2 PSI/MIN without incident in chamber #3.\par Patient resting comfortably @ depth, equal chest rise observed throughout treatment.\par Patient tolerated both air breaks well.\par Patient ascended from 2.4 DEEPAK @ 2.2 PSI/MIN without incident in chamber #3.\par Patient tolerated treatment well.\par Patient Exited the Hyperbaric Suite Safely\par \par \par \par

## 2022-06-22 ENCOUNTER — APPOINTMENT (OUTPATIENT)
Dept: HYPERBARIC MEDICINE | Facility: HOSPITAL | Age: 57
End: 2022-06-22
Payer: MEDICARE

## 2022-06-22 ENCOUNTER — OUTPATIENT (OUTPATIENT)
Dept: OUTPATIENT SERVICES | Facility: HOSPITAL | Age: 57
LOS: 1 days | End: 2022-06-22
Payer: MEDICARE

## 2022-06-22 VITALS
DIASTOLIC BLOOD PRESSURE: 71 MMHG | SYSTOLIC BLOOD PRESSURE: 129 MMHG | HEART RATE: 59 BPM | TEMPERATURE: 98 F | OXYGEN SATURATION: 94 % | RESPIRATION RATE: 18 BRPM

## 2022-06-22 VITALS
TEMPERATURE: 98 F | DIASTOLIC BLOOD PRESSURE: 79 MMHG | SYSTOLIC BLOOD PRESSURE: 147 MMHG | OXYGEN SATURATION: 95 % | HEART RATE: 58 BPM | RESPIRATION RATE: 20 BRPM

## 2022-06-22 DIAGNOSIS — Z89.421 ACQUIRED ABSENCE OF OTHER RIGHT TOE(S): Chronic | ICD-10-CM

## 2022-06-22 DIAGNOSIS — E11.621 TYPE 2 DIABETES MELLITUS WITH FOOT ULCER: ICD-10-CM

## 2022-06-22 DIAGNOSIS — L97.412 NON-PRESSURE CHRONIC ULCER OF RIGHT HEEL AND MIDFOOT WITH FAT LAYER EXPOSED: ICD-10-CM

## 2022-06-22 DIAGNOSIS — Z94.0 KIDNEY TRANSPLANT STATUS: Chronic | ICD-10-CM

## 2022-06-22 DIAGNOSIS — Z79.4 LONG TERM (CURRENT) USE OF INSULIN: ICD-10-CM

## 2022-06-22 DIAGNOSIS — N18.6 END STAGE RENAL DISEASE: Chronic | ICD-10-CM

## 2022-06-22 PROCEDURE — 82962 GLUCOSE BLOOD TEST: CPT

## 2022-06-22 PROCEDURE — 99183 HYPERBARIC OXYGEN THERAPY: CPT

## 2022-06-22 NOTE — ADDENDUM
[FreeTextEntry1] : Patients Order Verified Prior to Treatment. \par Patients Secondary Contraindication Examination Check Preformed by CHT. \par Patients Contraindication List and Pre-Check List, Verified and Signed by Technician and CHT Prior to Patients Treatment. \par Patient descended to 2.4 EDEPAK @ 2.2 PSI/MIN without incident in chamber #3.\par Patient resting comfortably @ depth, equal chest rise observed throughout treatment.\par Transfer of Observation from  to [facility] upon pt's arrival at tx. depth.\par The pt. was observed with visible chest motion and without incident for the duration of observed HBOT.\par The pt. was administered intermittent med. air over course of HBOT without incident. \par The pt. ascended from tx. depth to surface without incident.\par The pt. tolerated HBOT without incident. \par The pt. received wound care post-HBOT. \par The pt. was accompanied from HBO suite to changing area and from changing area to building exterior x [facility] team member.

## 2022-06-22 NOTE — PROCEDURE
[Outpatient] : Outpatient [Ambulatory] : Patient is ambulatory. [THIS CHAMBER HAS BEEN CLEANED / DISINFECTED] : This chamber has been cleaned / disinfected according to local and hospital policy and procedure prior to this treatment. [Patient demonstrated and verbalized proper technique for using air break mask] : Patient demonstrated and verbalized proper technique for using air break mask [Patient educated on the risks of SMOKING prior to HBOT with understanding] : Patient educated on the risks of SMOKING prior to HBOT with understanding [Patient educated on the risks of CONSUMING ALCOHOL prior to HBOT with understanding] : Patient educated on the risks of CONSUMING ALCOHOL prior to HBOT with understanding [100% Cotton] : 100% cotton [Empty all pockets] : empty all pockets [No hair oils, wigs, hairpieces, pins] : no hair oils, wigs, hairpieces, pins  [Pre tx medications] : pre tx medications  [No make-up, creams] : no make-up, creams  [No jewelry] : no jewelry  [No matches, cigarettes, lighters] : no matches, cigarettes, lighters  [Hearing aid removed] : hearing aid removed [Dentures removed] : dentures removed [Ground bracelet on pt's wrist] : ground bracelet on pt's wrist  [Contacts removed] : contacts removed  [Remove nail polish] : remove nail polish  [No reading material] : no reading material  [Bra, undergarments removed] : bra, undergarments removed  [No contraindicated dressings] : no contraindicated dressings [Ground Wire - VISUAL Verification - Intact/Free of Obstruction] : Ground Wire - VISUAL Verification - Intact/Free of Obstruction  [Ground Continuity - Verified < 1 ohm w/ Wrist Strap Gatito] : Ground Continuity - Verified < 1 ohm w/ Wrist Strap Gatito [Number: ___] : Number: [unfilled] [Diagnosis: ___] : Diagnosis: [unfilled] [____] : Post-Dive: Time - [unfilled] [___] : Post-Dive: Value - [unfilled] mg/dL [Clear all fields] : clear all fields [] : No [FreeTextEntry2] : MD Rx Verified [Hard Copy] [FreeTextEntry4] : 100 [FreeTextEntry6] : 4370 [FreeTextEntry8] : 9043 [de-identified] : 09 : 12 [de-identified] : 09 : 17 [de-identified] : 09 : 47 [de-identified] : 09 : 52 [de-identified] : 10 : 22 [de-identified] : 10 : 32 [de-identified] : 120 min.

## 2022-06-23 ENCOUNTER — APPOINTMENT (OUTPATIENT)
Dept: HYPERBARIC MEDICINE | Facility: HOSPITAL | Age: 57
End: 2022-06-23
Payer: MEDICARE

## 2022-06-23 ENCOUNTER — OUTPATIENT (OUTPATIENT)
Dept: OUTPATIENT SERVICES | Facility: HOSPITAL | Age: 57
LOS: 1 days | Discharge: HOME | End: 2022-06-23
Payer: MEDICARE

## 2022-06-23 VITALS
HEART RATE: 65 BPM | SYSTOLIC BLOOD PRESSURE: 153 MMHG | OXYGEN SATURATION: 96 % | TEMPERATURE: 99.5 F | RESPIRATION RATE: 18 BRPM | DIASTOLIC BLOOD PRESSURE: 84 MMHG

## 2022-06-23 VITALS
RESPIRATION RATE: 18 BRPM | HEART RATE: 61 BPM | OXYGEN SATURATION: 99 % | TEMPERATURE: 98.1 F | DIASTOLIC BLOOD PRESSURE: 85 MMHG | SYSTOLIC BLOOD PRESSURE: 152 MMHG

## 2022-06-23 DIAGNOSIS — Z94.0 KIDNEY TRANSPLANT STATUS: Chronic | ICD-10-CM

## 2022-06-23 DIAGNOSIS — Z79.4 LONG TERM (CURRENT) USE OF INSULIN: ICD-10-CM

## 2022-06-23 DIAGNOSIS — Z89.421 ACQUIRED ABSENCE OF OTHER RIGHT TOE(S): Chronic | ICD-10-CM

## 2022-06-23 DIAGNOSIS — L97.412 NON-PRESSURE CHRONIC ULCER OF RIGHT HEEL AND MIDFOOT WITH FAT LAYER EXPOSED: ICD-10-CM

## 2022-06-23 DIAGNOSIS — E11.621 TYPE 2 DIABETES MELLITUS WITH FOOT ULCER: ICD-10-CM

## 2022-06-23 DIAGNOSIS — N18.6 END STAGE RENAL DISEASE: Chronic | ICD-10-CM

## 2022-06-23 PROCEDURE — 99183 HYPERBARIC OXYGEN THERAPY: CPT

## 2022-06-23 PROCEDURE — G0277: CPT

## 2022-06-23 PROCEDURE — 82962 GLUCOSE BLOOD TEST: CPT

## 2022-06-23 NOTE — PROCEDURE
[Outpatient] : Outpatient [Ambulatory] : Patient is ambulatory. [THIS CHAMBER HAS BEEN CLEANED / DISINFECTED] : This chamber has been cleaned / disinfected according to local and hospital policy and procedure prior to this treatment. [Patient demonstrated and verbalized proper technique for using air break mask] : Patient demonstrated and verbalized proper technique for using air break mask [Patient educated on the risks of SMOKING prior to HBOT with understanding] : Patient educated on the risks of SMOKING prior to HBOT with understanding [Patient educated on the risks of CONSUMING ALCOHOL prior to HBOT with understanding] : Patient educated on the risks of CONSUMING ALCOHOL prior to HBOT with understanding [100% Cotton] : 100% cotton [Empty all pockets] : empty all pockets [No hair oils, wigs, hairpieces, pins] : no hair oils, wigs, hairpieces, pins  [Pre tx medications] : pre tx medications  [No make-up, creams] : no make-up, creams  [No jewelry] : no jewelry  [No matches, cigarettes, lighters] : no matches, cigarettes, lighters  [Hearing aid removed] : hearing aid removed [Dentures removed] : dentures removed [Ground bracelet on pt's wrist] : ground bracelet on pt's wrist  [Contacts removed] : contacts removed  [Remove nail polish] : remove nail polish  [No reading material] : no reading material  [Bra, undergarments removed] : bra, undergarments removed  [No contraindicated dressings] : no contraindicated dressings [Ground Wire - VISUAL Verification - Intact/Free of Obstruction] : Ground Wire - VISUAL Verification - Intact/Free of Obstruction  [Ground Continuity - Verified < 1 ohm w/ Wrist Strap Gatito] : Ground Continuity - Verified < 1 ohm w/ Wrist Strap Gatito [Number: ___] : Number: [unfilled] [Diagnosis: ___] : Diagnosis: [unfilled] [____] : Post-Dive: Time - [unfilled] [___] : Post-Dive: Value - [unfilled] mg/dL [Clear all fields] : clear all fields [] : No [FreeTextEntry4] : 100 [FreeTextEntry6] : 5163 [FreeTextEntry8] : 9392 [de-identified] : 6908 [de-identified] : 0978 [de-identified] : 0995 [de-identified] : 2410 [de-identified] : 1005 [de-identified] : 6646 [de-identified] : 120 MINUTES

## 2022-06-23 NOTE — ADDENDUM
[FreeTextEntry1] : PT ARRIVED A&OX3 \par ALL VITALS WITHIN PARAMETERS FOR HBOT\par SMALL DRAINAGE NOTED ON DRESSING PRE HBOT \par PT PRE-DIVE CHECKLIST SIGNED AND WITNESSED BY CHT\par PT DESCENDED TO 2.4 DEEPAK @ 2.2 PSI/MIN IN CHAMBER #1  WITHOUT INCIDENT\par PT RESTING AT TX DEPTH WITH VISIBLE CHEST RISE AND FALL OBSERVED CHAMBER SIDE\par PT TOLERATED AIR BREAKS WELL\par PT ASCENDED FROM 2.4 DEEPAK @ 2.2 PSI/MIN WITHOUT INCIDENT\par PT TOLERATED TX WELL\par PT RECEIVED ACE WRAPS POST HBOT

## 2022-06-24 ENCOUNTER — OUTPATIENT (OUTPATIENT)
Dept: OUTPATIENT SERVICES | Facility: HOSPITAL | Age: 57
LOS: 1 days | Discharge: HOME | End: 2022-06-24
Payer: MEDICARE

## 2022-06-24 ENCOUNTER — APPOINTMENT (OUTPATIENT)
Dept: HYPERBARIC MEDICINE | Facility: HOSPITAL | Age: 57
End: 2022-06-24
Payer: MEDICARE

## 2022-06-24 VITALS
HEART RATE: 60 BPM | DIASTOLIC BLOOD PRESSURE: 82 MMHG | OXYGEN SATURATION: 96 % | SYSTOLIC BLOOD PRESSURE: 156 MMHG | RESPIRATION RATE: 18 BRPM | TEMPERATURE: 98.4 F

## 2022-06-24 VITALS
HEART RATE: 59 BPM | RESPIRATION RATE: 20 BRPM | SYSTOLIC BLOOD PRESSURE: 141 MMHG | TEMPERATURE: 98.4 F | DIASTOLIC BLOOD PRESSURE: 73 MMHG | OXYGEN SATURATION: 97 %

## 2022-06-24 DIAGNOSIS — Z94.0 KIDNEY TRANSPLANT STATUS: Chronic | ICD-10-CM

## 2022-06-24 DIAGNOSIS — N18.6 END STAGE RENAL DISEASE: Chronic | ICD-10-CM

## 2022-06-24 DIAGNOSIS — E11.621 TYPE 2 DIABETES MELLITUS WITH FOOT ULCER: ICD-10-CM

## 2022-06-24 DIAGNOSIS — Z89.421 ACQUIRED ABSENCE OF OTHER RIGHT TOE(S): Chronic | ICD-10-CM

## 2022-06-24 LAB — SARS-COV-2 RNA SPEC QL NAA+PROBE: SIGNIFICANT CHANGE UP

## 2022-06-24 PROCEDURE — G0277: CPT

## 2022-06-24 PROCEDURE — 82962 GLUCOSE BLOOD TEST: CPT

## 2022-06-24 PROCEDURE — U0003: CPT

## 2022-06-24 PROCEDURE — 99183 HYPERBARIC OXYGEN THERAPY: CPT

## 2022-06-24 PROCEDURE — U0005: CPT

## 2022-06-24 NOTE — ASSESSMENT
[Patient prepared for dive] : Patient prepared for dive [Tolerating dive well] : Tolerating dive well [Clinically Stable] : Clinically stable

## 2022-06-24 NOTE — PROCEDURE
[Outpatient] : Outpatient [Ambulatory] : Patient is ambulatory. [THIS CHAMBER HAS BEEN CLEANED / DISINFECTED] : This chamber has been cleaned / disinfected according to local and hospital policy and procedure prior to this treatment. [Patient demonstrated and verbalized proper technique for using air break mask] : Patient demonstrated and verbalized proper technique for using air break mask [Patient educated on the risks of SMOKING prior to HBOT with understanding] : Patient educated on the risks of SMOKING prior to HBOT with understanding [Patient educated on the risks of CONSUMING ALCOHOL prior to HBOT with understanding] : Patient educated on the risks of CONSUMING ALCOHOL prior to HBOT with understanding [100% Cotton] : 100% cotton [Empty all pockets] : empty all pockets [No hair oils, wigs, hairpieces, pins] : no hair oils, wigs, hairpieces, pins  [Pre tx medications] : pre tx medications  [No make-up, creams] : no make-up, creams  [No jewelry] : no jewelry  [No matches, cigarettes, lighters] : no matches, cigarettes, lighters  [Hearing aid removed] : hearing aid removed [Dentures removed] : dentures removed [Ground bracelet on pt's wrist] : ground bracelet on pt's wrist  [Contacts removed] : contacts removed  [Remove nail polish] : remove nail polish  [No reading material] : no reading material  [Bra, undergarments removed] : bra, undergarments removed  [No contraindicated dressings] : no contraindicated dressings [Ground Wire - VISUAL Verification - Intact/Free of Obstruction] : Ground Wire - VISUAL Verification - Intact/Free of Obstruction  [Ground Continuity - Verified < 1 ohm w/ Wrist Strap Gatito] : Ground Continuity - Verified < 1 ohm w/ Wrist Strap Gatito [Number: ___] : Number: [unfilled] [Diagnosis: ___] : Diagnosis: [unfilled] [____] : Post-Dive: Time - [unfilled] [___] : Post-Dive: Value - [unfilled] mg/dL [Clear all fields] : clear all fields [] : No [FreeTextEntry4] : 100 [FreeTextEntry6] : 8:13 [FreeTextEntry8] : 8:23 [de-identified] : 8:53 [de-identified] : 8:58 [de-identified] : 9:28 [de-identified] : 9:33 [de-identified] : 10:03 [de-identified] : 1015 [de-identified] : 120 MINUTES

## 2022-06-24 NOTE — ADDENDUM
[FreeTextEntry1] : Pt arrived ambulatory A&Ox4.\par All vitals within parameters for hbot. Covid swab to be obtained post tx.\par Drainage evaluated by nurse, wound care to follow hbot.\par when asked, pt reports leaving ace bandage in place overnight. The pt was advised to remove the ace bandage at HS due to increased swelling above and below the ace bandage as well as a deep indentation of right ankle under the ace. Nurse also instructed the pt to remove the ace prior to bed. The pt reported understanding.\par Pt descent to Rx tx depth in chamber 3 was without incident. Pt resting at depth, chest rise and fall observed.\par Pt tolerated air breaks well.\par PT ASCENDED FROM 2.4 DEEPAK @ 2.2 PSI/MIN WITHOUT INCIDENT\par PT TOLERATED TX WELL\par

## 2022-06-25 ENCOUNTER — APPOINTMENT (OUTPATIENT)
Dept: HYPERBARIC MEDICINE | Facility: HOSPITAL | Age: 57
End: 2022-06-25

## 2022-06-25 ENCOUNTER — OUTPATIENT (OUTPATIENT)
Dept: OUTPATIENT SERVICES | Facility: HOSPITAL | Age: 57
LOS: 1 days | Discharge: HOME | End: 2022-06-25
Payer: MEDICARE

## 2022-06-25 VITALS
SYSTOLIC BLOOD PRESSURE: 178 MMHG | DIASTOLIC BLOOD PRESSURE: 87 MMHG | RESPIRATION RATE: 18 BRPM | HEART RATE: 60 BPM | TEMPERATURE: 97.3 F | OXYGEN SATURATION: 95 %

## 2022-06-25 VITALS
RESPIRATION RATE: 18 BRPM | DIASTOLIC BLOOD PRESSURE: 64 MMHG | OXYGEN SATURATION: 94 % | TEMPERATURE: 98.2 F | SYSTOLIC BLOOD PRESSURE: 177 MMHG | HEART RATE: 64 BPM

## 2022-06-25 DIAGNOSIS — E11.621 TYPE 2 DIABETES MELLITUS WITH FOOT ULCER: ICD-10-CM

## 2022-06-25 DIAGNOSIS — L97.412 NON-PRESSURE CHRONIC ULCER OF RIGHT HEEL AND MIDFOOT WITH FAT LAYER EXPOSED: ICD-10-CM

## 2022-06-25 DIAGNOSIS — N18.6 END STAGE RENAL DISEASE: Chronic | ICD-10-CM

## 2022-06-25 DIAGNOSIS — Z79.4 LONG TERM (CURRENT) USE OF INSULIN: ICD-10-CM

## 2022-06-25 DIAGNOSIS — Z94.0 KIDNEY TRANSPLANT STATUS: Chronic | ICD-10-CM

## 2022-06-25 DIAGNOSIS — Z89.421 ACQUIRED ABSENCE OF OTHER RIGHT TOE(S): Chronic | ICD-10-CM

## 2022-06-25 DIAGNOSIS — Z20.822 CONTACT WITH AND (SUSPECTED) EXPOSURE TO COVID-19: ICD-10-CM

## 2022-06-25 PROCEDURE — 99183 HYPERBARIC OXYGEN THERAPY: CPT

## 2022-06-25 PROCEDURE — 82962 GLUCOSE BLOOD TEST: CPT

## 2022-06-25 PROCEDURE — G0277: CPT

## 2022-06-25 NOTE — ADDENDUM
[FreeTextEntry1] : The pt. presented to [facility] ambulatory and A&Ox3 for scheduled HBOT.\par The pt's pre-dive screening found ineligible BGL to begin HBOT.\par The pt. was provided 16g glucose via x2 apple juice and was observed consuming same.\par After 15 min. for metabolization of same, the pt's BGL was re-tested and found to be within acceptable limits to begin HBOT.\par \par The pt. was observed to have a large area of serous drainage on the plantar aspect of his R foot.\par CHRN was advised.\par Per CHRN, the pt. may wait until post-HBOT for wound dressing change.\par \par The pt's pre-dive screening was found to be within acceptable limits to begin HBOT.\par "HBO-TO" secondary PDS found the pt. fit to begin HBOTx2.\par \par The pt. was provided a review of S/S of hypoglycemia and procedure for consuming emergency glucose tablets prior to start of HBOT. The pt. expressed understanding of provided information. \par \par The pt. descended @ 2.2 PSI/min. to Rx'd HBOT tx. depth of 2.4 DEEPAK in chamber # 3 without incident.\par The pt. was observed with visible chest motion and without incident for the duration of HBOT.\par The pt. was administered intermittent med. air over course of HBOT without incident.\par The pt. ascended from tx. depth to surface without incident.\par The pt. tolerated HBOT without incident. \par The pt. received wound care x RN post-HBOT.\par The pt. was accompanied during ambulation from HBO suite to HBO changing room and from changing room to building exterior x [facility] staff.

## 2022-06-25 NOTE — PROCEDURE
[Outpatient] : Outpatient [Ambulatory] : Patient is ambulatory. [THIS CHAMBER HAS BEEN CLEANED / DISINFECTED] : This chamber has been cleaned / disinfected according to local and hospital policy and procedure prior to this treatment. [Patient demonstrated and verbalized proper technique for using air break mask] : Patient demonstrated and verbalized proper technique for using air break mask [Patient educated on the risks of SMOKING prior to HBOT with understanding] : Patient educated on the risks of SMOKING prior to HBOT with understanding [Patient educated on the risks of CONSUMING ALCOHOL prior to HBOT with understanding] : Patient educated on the risks of CONSUMING ALCOHOL prior to HBOT with understanding [100% Cotton] : 100% cotton [Empty all pockets] : empty all pockets [No hair oils, wigs, hairpieces, pins] : no hair oils, wigs, hairpieces, pins  [Pre tx medications] : pre tx medications  [No make-up, creams] : no make-up, creams  [No jewelry] : no jewelry  [No matches, cigarettes, lighters] : no matches, cigarettes, lighters  [Hearing aid removed] : hearing aid removed [Dentures removed] : dentures removed [Ground bracelet on pt's wrist] : ground bracelet on pt's wrist  [Contacts removed] : contacts removed  [Remove nail polish] : remove nail polish  [No reading material] : no reading material  [Bra, undergarments removed] : bra, undergarments removed  [No contraindicated dressings] : no contraindicated dressings [Ground Wire - VISUAL Verification - Intact/Free of Obstruction] : Ground Wire - VISUAL Verification - Intact/Free of Obstruction  [Ground Continuity - Verified < 1 ohm w/ Wrist Strap Gatito] : Ground Continuity - Verified < 1 ohm w/ Wrist Strap Gatito [Number: ___] : Number: [unfilled] [Diagnosis: ___] : Diagnosis: [unfilled] [____] : Post-Dive: Time - [unfilled] [___] : Post-Dive: Value - [unfilled] mg/dL [Clear all fields] : clear all fields [] : No [FreeTextEntry2] : MD Rx Verified [FreeTextEntry4] : 100 [FreeTextEntry6] : 08 : 48 [FreeTextEntry8] : 08 : 58 [de-identified] : 09 : 28 [de-identified] : 09 : 33 [de-identified] : 10 : 03 [de-identified] : 10 : 08 [de-identified] : 10 : 38 [de-identified] : 10 : 48 [de-identified] : 120 min.

## 2022-06-27 ENCOUNTER — APPOINTMENT (OUTPATIENT)
Dept: HYPERBARIC MEDICINE | Facility: HOSPITAL | Age: 57
End: 2022-06-27
Payer: MEDICARE

## 2022-06-27 ENCOUNTER — OUTPATIENT (OUTPATIENT)
Dept: OUTPATIENT SERVICES | Facility: HOSPITAL | Age: 57
LOS: 1 days | Discharge: HOME | End: 2022-06-27
Payer: MEDICARE

## 2022-06-27 VITALS
DIASTOLIC BLOOD PRESSURE: 82 MMHG | TEMPERATURE: 96.2 F | OXYGEN SATURATION: 98 % | HEART RATE: 58 BPM | SYSTOLIC BLOOD PRESSURE: 148 MMHG | RESPIRATION RATE: 20 BRPM

## 2022-06-27 VITALS
HEART RATE: 62 BPM | RESPIRATION RATE: 20 BRPM | DIASTOLIC BLOOD PRESSURE: 82 MMHG | OXYGEN SATURATION: 97 % | SYSTOLIC BLOOD PRESSURE: 169 MMHG | TEMPERATURE: 97.3 F

## 2022-06-27 DIAGNOSIS — Z94.0 KIDNEY TRANSPLANT STATUS: Chronic | ICD-10-CM

## 2022-06-27 DIAGNOSIS — N18.6 END STAGE RENAL DISEASE: Chronic | ICD-10-CM

## 2022-06-27 DIAGNOSIS — E11.621 TYPE 2 DIABETES MELLITUS WITH FOOT ULCER: ICD-10-CM

## 2022-06-27 DIAGNOSIS — Z89.421 ACQUIRED ABSENCE OF OTHER RIGHT TOE(S): Chronic | ICD-10-CM

## 2022-06-27 PROCEDURE — 82962 GLUCOSE BLOOD TEST: CPT

## 2022-06-27 PROCEDURE — G0277: CPT

## 2022-06-27 PROCEDURE — 99183 HYPERBARIC OXYGEN THERAPY: CPT

## 2022-06-27 NOTE — ADDENDUM
[FreeTextEntry1] : Pt arrived axox3\par all pt vitals within parameters for HBOT\par pt secondary check performed and negative\par pt descended to tx depth of 2.4ATA @2.2psi/min in chamber 3 without incident\par pt seen resting at depth with visible chest rise and fall observed\par pt seen tolerating airbreaks without incident\par pt ascended to surface pressure without incident\par pt received wound care post tx

## 2022-06-27 NOTE — PROCEDURE
[Outpatient] : Outpatient [Ambulatory] : Patient is ambulatory. [THIS CHAMBER HAS BEEN CLEANED / DISINFECTED] : This chamber has been cleaned / disinfected according to local and hospital policy and procedure prior to this treatment. [100% Cotton] : 100% cotton [Empty all pockets] : empty all pockets [No hair oils, wigs, hairpieces, pins] : no hair oils, wigs, hairpieces, pins  [Pre tx medications] : pre tx medications  [No make-up, creams] : no make-up, creams  [No jewelry] : no jewelry  [No matches, cigarettes, lighters] : no matches, cigarettes, lighters  [Hearing aid removed] : hearing aid removed [Dentures removed] : dentures removed [Ground bracelet on pt's wrist] : ground bracelet on pt's wrist  [Contacts removed] : contacts removed  [Remove nail polish] : remove nail polish  [No reading material] : no reading material  [Bra, undergarments removed] : bra, undergarments removed  [No contraindicated dressings] : no contraindicated dressings [Ground Wire - VISUAL Verification - Intact/Free of Obstruction] : Ground Wire - VISUAL Verification - Intact/Free of Obstruction  [Ground Continuity - Verified < 1 ohm w/ Wrist Strap Gatito] : Ground Continuity - Verified < 1 ohm w/ Wrist Strap Gatito [Number: ___] : Number: [unfilled] [Diagnosis: ___] : Diagnosis: [unfilled] [____] : Post-Dive: Time - [unfilled] [___] : Post-Dive: Value - [unfilled] mg/dL [Patient demonstrated and verbalized proper technique for using air break mask] : Patient demonstrated and verbalized proper technique for using air break mask [Patient educated on the risks of SMOKING prior to HBOT with understanding] : Patient educated on the risks of SMOKING prior to HBOT with understanding [Patient educated on the risks of CONSUMING ALCOHOL prior to HBOT with understanding] : Patient educated on the risks of CONSUMING ALCOHOL prior to HBOT with understanding [Clear all fields] : clear all fields [] : No [FreeTextEntry4] : 100 Min [FreeTextEntry6] : 1012 [FreeTextEntry8] : 5310 [de-identified] : 8600 [de-identified] : 8753 [de-identified] : 1128 [de-identified] : 5453 [de-identified] : 1203 [de-identified] : 0697 [de-identified] : 120 Min

## 2022-06-28 ENCOUNTER — OUTPATIENT (OUTPATIENT)
Dept: OUTPATIENT SERVICES | Facility: HOSPITAL | Age: 57
LOS: 1 days | Discharge: HOME | End: 2022-06-28
Payer: MEDICARE

## 2022-06-28 ENCOUNTER — APPOINTMENT (OUTPATIENT)
Dept: HYPERBARIC MEDICINE | Facility: HOSPITAL | Age: 57
End: 2022-06-28

## 2022-06-28 VITALS
HEART RATE: 58 BPM | SYSTOLIC BLOOD PRESSURE: 139 MMHG | RESPIRATION RATE: 20 BRPM | DIASTOLIC BLOOD PRESSURE: 63 MMHG | TEMPERATURE: 97.9 F | OXYGEN SATURATION: 96 %

## 2022-06-28 VITALS
HEART RATE: 54 BPM | OXYGEN SATURATION: 98 % | RESPIRATION RATE: 18 BRPM | DIASTOLIC BLOOD PRESSURE: 93 MMHG | TEMPERATURE: 98.4 F | SYSTOLIC BLOOD PRESSURE: 188 MMHG

## 2022-06-28 VITALS — DIASTOLIC BLOOD PRESSURE: 91 MMHG | SYSTOLIC BLOOD PRESSURE: 176 MMHG

## 2022-06-28 DIAGNOSIS — E11.621 TYPE 2 DIABETES MELLITUS WITH FOOT ULCER: ICD-10-CM

## 2022-06-28 DIAGNOSIS — Z94.0 KIDNEY TRANSPLANT STATUS: Chronic | ICD-10-CM

## 2022-06-28 DIAGNOSIS — N18.6 END STAGE RENAL DISEASE: Chronic | ICD-10-CM

## 2022-06-28 DIAGNOSIS — Z79.4 LONG TERM (CURRENT) USE OF INSULIN: ICD-10-CM

## 2022-06-28 DIAGNOSIS — L97.412 NON-PRESSURE CHRONIC ULCER OF RIGHT HEEL AND MIDFOOT WITH FAT LAYER EXPOSED: ICD-10-CM

## 2022-06-28 DIAGNOSIS — Z89.421 ACQUIRED ABSENCE OF OTHER RIGHT TOE(S): Chronic | ICD-10-CM

## 2022-06-28 PROCEDURE — 82962 GLUCOSE BLOOD TEST: CPT

## 2022-06-28 PROCEDURE — G0277: CPT

## 2022-06-28 PROCEDURE — 99183 HYPERBARIC OXYGEN THERAPY: CPT

## 2022-06-29 ENCOUNTER — APPOINTMENT (OUTPATIENT)
Dept: HYPERBARIC MEDICINE | Facility: HOSPITAL | Age: 57
End: 2022-06-29

## 2022-06-29 ENCOUNTER — OUTPATIENT (OUTPATIENT)
Dept: OUTPATIENT SERVICES | Facility: HOSPITAL | Age: 57
LOS: 1 days | Discharge: HOME | End: 2022-06-29
Payer: MEDICARE

## 2022-06-29 ENCOUNTER — NON-APPOINTMENT (OUTPATIENT)
Age: 57
End: 2022-06-29

## 2022-06-29 VITALS
TEMPERATURE: 97.7 F | RESPIRATION RATE: 20 BRPM | HEART RATE: 62 BPM | OXYGEN SATURATION: 96 % | SYSTOLIC BLOOD PRESSURE: 188 MMHG | DIASTOLIC BLOOD PRESSURE: 81 MMHG

## 2022-06-29 VITALS
HEART RATE: 57 BPM | SYSTOLIC BLOOD PRESSURE: 180 MMHG | DIASTOLIC BLOOD PRESSURE: 89 MMHG | RESPIRATION RATE: 20 BRPM | TEMPERATURE: 97.3 F | OXYGEN SATURATION: 96 %

## 2022-06-29 DIAGNOSIS — Z79.4 LONG TERM (CURRENT) USE OF INSULIN: ICD-10-CM

## 2022-06-29 DIAGNOSIS — N18.6 END STAGE RENAL DISEASE: Chronic | ICD-10-CM

## 2022-06-29 DIAGNOSIS — Z94.0 KIDNEY TRANSPLANT STATUS: Chronic | ICD-10-CM

## 2022-06-29 DIAGNOSIS — E11.621 TYPE 2 DIABETES MELLITUS WITH FOOT ULCER: ICD-10-CM

## 2022-06-29 DIAGNOSIS — Z89.421 ACQUIRED ABSENCE OF OTHER RIGHT TOE(S): Chronic | ICD-10-CM

## 2022-06-29 DIAGNOSIS — L97.412 NON-PRESSURE CHRONIC ULCER OF RIGHT HEEL AND MIDFOOT WITH FAT LAYER EXPOSED: ICD-10-CM

## 2022-06-29 PROCEDURE — 82962 GLUCOSE BLOOD TEST: CPT

## 2022-06-29 PROCEDURE — 99183 HYPERBARIC OXYGEN THERAPY: CPT

## 2022-06-29 PROCEDURE — G0277: CPT

## 2022-06-29 NOTE — PROCEDURE
[Outpatient] : Outpatient [Ambulatory] : Patient is ambulatory. [THIS CHAMBER HAS BEEN CLEANED / DISINFECTED] : This chamber has been cleaned / disinfected according to local and hospital policy and procedure prior to this treatment. [Patient demonstrated and verbalized proper technique for using air break mask] : Patient demonstrated and verbalized proper technique for using air break mask [Patient educated on the risks of SMOKING prior to HBOT with understanding] : Patient educated on the risks of SMOKING prior to HBOT with understanding [Patient educated on the risks of CONSUMING ALCOHOL prior to HBOT with understanding] : Patient educated on the risks of CONSUMING ALCOHOL prior to HBOT with understanding [100% Cotton] : 100% cotton [Empty all pockets] : empty all pockets [No hair oils, wigs, hairpieces, pins] : no hair oils, wigs, hairpieces, pins  [Pre tx medications] : pre tx medications  [No make-up, creams] : no make-up, creams  [No jewelry] : no jewelry  [No matches, cigarettes, lighters] : no matches, cigarettes, lighters  [Hearing aid removed] : hearing aid removed [Dentures removed] : dentures removed [Ground bracelet on pt's wrist] : ground bracelet on pt's wrist  [Contacts removed] : contacts removed  [Remove nail polish] : remove nail polish  [No reading material] : no reading material  [Bra, undergarments removed] : bra, undergarments removed  [No contraindicated dressings] : no contraindicated dressings [Ground Wire - VISUAL Verification - Intact/Free of Obstruction] : Ground Wire - VISUAL Verification - Intact/Free of Obstruction  [Ground Continuity - Verified < 1 ohm w/ Wrist Strap Gatito] : Ground Continuity - Verified < 1 ohm w/ Wrist Strap Gatito [Number: ___] : Number: [unfilled] [Diagnosis: ___] : Diagnosis: [unfilled] [Clear all fields] : clear all fields [____] : Post-Dive: Time - [unfilled] [___] : Post-Dive: Value - [unfilled] mg/dL [] : No [FreeTextEntry4] : 100 [FreeTextEntry6] : 9064 [FreeTextEntry8] : 2285 [de-identified] : 0944 [de-identified] : 0980 [de-identified] : 0902 [de-identified] : 1400 [de-identified] : 101 [de-identified] : 1021 [de-identified] : 120mins

## 2022-06-29 NOTE — PROCEDURE
[Outpatient] : Outpatient [Ambulatory] : Patient is ambulatory. [THIS CHAMBER HAS BEEN CLEANED / DISINFECTED] : This chamber has been cleaned / disinfected according to local and hospital policy and procedure prior to this treatment. [Patient demonstrated and verbalized proper technique for using air break mask] : Patient demonstrated and verbalized proper technique for using air break mask [Patient educated on the risks of SMOKING prior to HBOT with understanding] : Patient educated on the risks of SMOKING prior to HBOT with understanding [Patient educated on the risks of CONSUMING ALCOHOL prior to HBOT with understanding] : Patient educated on the risks of CONSUMING ALCOHOL prior to HBOT with understanding [100% Cotton] : 100% cotton [Empty all pockets] : empty all pockets [No hair oils, wigs, hairpieces, pins] : no hair oils, wigs, hairpieces, pins  [Pre tx medications] : pre tx medications  [No make-up, creams] : no make-up, creams  [No jewelry] : no jewelry  [No matches, cigarettes, lighters] : no matches, cigarettes, lighters  [Hearing aid removed] : hearing aid removed [Dentures removed] : dentures removed [Ground bracelet on pt's wrist] : ground bracelet on pt's wrist  [Contacts removed] : contacts removed  [Remove nail polish] : remove nail polish  [No reading material] : no reading material  [Bra, undergarments removed] : bra, undergarments removed  [No contraindicated dressings] : no contraindicated dressings [Ground Wire - VISUAL Verification - Intact/Free of Obstruction] : Ground Wire - VISUAL Verification - Intact/Free of Obstruction  [Ground Continuity - Verified < 1 ohm w/ Wrist Strap Gatito] : Ground Continuity - Verified < 1 ohm w/ Wrist Strap Gatito [Number: ___] : Number: [unfilled] [Diagnosis: ___] : Diagnosis: [unfilled] [____] : Post-Dive: Time - [unfilled] [___] : Post-Dive: Value - [unfilled] mg/dL [Clear all fields] : clear all fields [] : No [FreeTextEntry4] : 100 [FreeTextEntry6] : 8:20 [FreeTextEntry8] : 8:30 [de-identified] : 9:00 [de-identified] : 9:05 [de-identified] : 9:35 [de-identified] : 9:40 [de-identified] : 10:10 [de-identified] : 10:20 [de-identified] : 120

## 2022-06-29 NOTE — PROCEDURE
[Outpatient] : Outpatient [Ambulatory] : Patient is ambulatory. [THIS CHAMBER HAS BEEN CLEANED / DISINFECTED] : This chamber has been cleaned / disinfected according to local and hospital policy and procedure prior to this treatment. [Patient demonstrated and verbalized proper technique for using air break mask] : Patient demonstrated and verbalized proper technique for using air break mask [Patient educated on the risks of SMOKING prior to HBOT with understanding] : Patient educated on the risks of SMOKING prior to HBOT with understanding [Patient educated on the risks of CONSUMING ALCOHOL prior to HBOT with understanding] : Patient educated on the risks of CONSUMING ALCOHOL prior to HBOT with understanding [100% Cotton] : 100% cotton [Empty all pockets] : empty all pockets [No hair oils, wigs, hairpieces, pins] : no hair oils, wigs, hairpieces, pins  [Pre tx medications] : pre tx medications  [No make-up, creams] : no make-up, creams  [No jewelry] : no jewelry  [No matches, cigarettes, lighters] : no matches, cigarettes, lighters  [Hearing aid removed] : hearing aid removed [Dentures removed] : dentures removed [Ground bracelet on pt's wrist] : ground bracelet on pt's wrist  [Contacts removed] : contacts removed  [Remove nail polish] : remove nail polish  [No reading material] : no reading material  [Bra, undergarments removed] : bra, undergarments removed  [No contraindicated dressings] : no contraindicated dressings [Ground Wire - VISUAL Verification - Intact/Free of Obstruction] : Ground Wire - VISUAL Verification - Intact/Free of Obstruction  [Ground Continuity - Verified < 1 ohm w/ Wrist Strap Gatito] : Ground Continuity - Verified < 1 ohm w/ Wrist Strap Gatito [Number: ___] : Number: [unfilled] [Diagnosis: ___] : Diagnosis: [unfilled] [Clear all fields] : clear all fields [____] : Post-Dive: Time - [unfilled] [___] : Post-Dive: Value - [unfilled] mg/dL [] : No [FreeTextEntry2] : MD Rx Verified [Hard Copy] [FreeTextEntry4] : 100 [FreeTextEntry6] : 08 : 40 [FreeTextEntry8] : 08 : 50 [de-identified] : 09 : 20 [de-identified] : 09 : 25 [de-identified] : 09 : 55 [de-identified] : 10 : 00 [de-identified] : 10 : 30 [de-identified] : 10 : 40 [de-identified] : 120 min.

## 2022-06-29 NOTE — ADDENDUM
[FreeTextEntry1] : Patients Order Verified Prior to Treatment. \par Patients Secondary Contraindication Examination Check Preformed by CHT. \par Patients Contraindication List and Pre-Check List, Verified and Signed by Technician and CHT Prior to Patients Treatment. \par Patient descended to 2.4 DEEPAK @ 2.2 PSI/MIN without incident in chamber #4.\par Patient resting comfortably @ depth, equal chest rise observed throughout treatment.\par Patient tolerated both air breaks well.\par Patient ascended from 2.4 DEEPAK @ 2.2 PSI/MIN without incident in chamber #4.\par Patient tolerated treatment well.\par Patient Exited the Hyperbaric Suite Safely.\par Patients Dressing Changed Post Hyperbaric Treatment by Nurse. \par \par

## 2022-06-29 NOTE — ADDENDUM
[FreeTextEntry1] : PT ARRIVED AMBULATORY A&OX3.\par ALL VITALS WITHIN PARAMETERS FOR HBOT.\par DRAINAGE EVALUATED BY NURSE PRIOR TO DESCENT. \par WOUND CARE TO FOLLOW HBOT.\par PT DESCENT TO RX TX DEPTH IN CHAMBER 3 WAS WITHOUT INCIDENT. \par PT RESTING AT DEPTH, CHEST RISE AND FALL OBSERVED

## 2022-06-29 NOTE — ASSESSMENT

## 2022-06-29 NOTE — ADDENDUM
[FreeTextEntry1] : The pt. presented to [facility] ambulatory and A&Ox3 for scheduled HBOT.\par \par The pt's pre-dive screening found a large area of dry serous drainage on the plantar aspect of the pt's R foot. \par RN was advised. \par Per RN, the pt. may receive an outer dressing change post-HBOT.\par \par The pt's insulin port was covered with chamber-appropriate material and secured to the pt.'s body with paper tape prior to start of HBOT. \par \par The pt's pre-dive screening was found to be within acceptable limits to begin HBOT.\par "HBO-TO" secondary PDS performed x CHT found the pt. fit to begin HBOTx2. \par \par The pt. descended @ 2.2 PSI/min. to Rx'd HBOT tx. depth of 2.4 DEEPAK in chamber # 3 without incident.\par The pt. was observed with visible chest motion and without incident for the duration of HBOT.\par The pt. was administered intermittent med. air over course of HBOT without incident. \par The pt. ascended from tx. depth to surface without incident.\par The pt. tolerated HBOT without incident.\par The pt's post-tx. BP was found to be elevated. \par Nurse was advised. \par Per Nurse, the pt. should have a re-test of BP after a brief period of rest to ensure trend in appropriate direction.\par Pt's BP was re-tested and found to have improved over original post-HBOT tx. reading. \par Nurse was advised of same and cleared pt. for discharge from [facility].\par \par The pt. received outer wound dressing change with ACE wrap application only x Nurse. post-tx. \par The pt. exited HBO suite accompanied x Nurse to HBO changing room.\par Pt. was accompanied from HBO changing room to building exterior x [facility] team member.

## 2022-06-30 ENCOUNTER — APPOINTMENT (OUTPATIENT)
Dept: WOUND CARE | Facility: HOSPITAL | Age: 57
End: 2022-06-30

## 2022-06-30 ENCOUNTER — OUTPATIENT (OUTPATIENT)
Dept: OUTPATIENT SERVICES | Facility: HOSPITAL | Age: 57
LOS: 1 days | Discharge: HOME | End: 2022-06-30
Payer: MEDICARE

## 2022-06-30 VITALS
BODY MASS INDEX: 39.76 KG/M2 | TEMPERATURE: 97.9 F | DIASTOLIC BLOOD PRESSURE: 92 MMHG | HEART RATE: 67 BPM | SYSTOLIC BLOOD PRESSURE: 185 MMHG | WEIGHT: 300 LBS | RESPIRATION RATE: 20 BRPM | HEIGHT: 73 IN | OXYGEN SATURATION: 97 %

## 2022-06-30 DIAGNOSIS — Z89.421 ACQUIRED ABSENCE OF OTHER RIGHT TOE(S): Chronic | ICD-10-CM

## 2022-06-30 DIAGNOSIS — N18.6 END STAGE RENAL DISEASE: Chronic | ICD-10-CM

## 2022-06-30 DIAGNOSIS — E11.621 TYPE 2 DIABETES MELLITUS WITH FOOT ULCER: ICD-10-CM

## 2022-06-30 DIAGNOSIS — Z94.0 KIDNEY TRANSPLANT STATUS: Chronic | ICD-10-CM

## 2022-06-30 DIAGNOSIS — Z79.4 LONG TERM (CURRENT) USE OF INSULIN: ICD-10-CM

## 2022-06-30 DIAGNOSIS — L97.412 NON-PRESSURE CHRONIC ULCER OF RIGHT HEEL AND MIDFOOT WITH FAT LAYER EXPOSED: ICD-10-CM

## 2022-06-30 LAB
GRAM STN FLD: SIGNIFICANT CHANGE UP
SPECIMEN SOURCE: SIGNIFICANT CHANGE UP

## 2022-06-30 PROCEDURE — 87070 CULTURE OTHR SPECIMN AEROBIC: CPT

## 2022-06-30 PROCEDURE — 87186 SC STD MICRODIL/AGAR DIL: CPT

## 2022-06-30 PROCEDURE — 11042 DBRDMT SUBQ TIS 1ST 20SQCM/<: CPT

## 2022-06-30 PROCEDURE — 11042 DBRDMT SUBQ TIS 1ST 20SQCM/<: CPT | Mod: 58

## 2022-06-30 PROCEDURE — 87077 CULTURE AEROBIC IDENTIFY: CPT

## 2022-06-30 PROCEDURE — 87075 CULTR BACTERIA EXCEPT BLOOD: CPT

## 2022-06-30 NOTE — ASSESSMENT
[Verbal] : Verbal [Written] : Written [Demo] : Demo [Patient] : Patient [Good - alert, interested, motivated] : Good - alert, interested, motivated [Demonstrates independently] : demonstrates independently [Dressing changes] : dressing changes [Foot Care] : foot care [Skin Care] : skin care [Signs and symptoms of infection] : sign and symptoms of infection [Nutrition] : nutrition [How and When to Call] : how and when to call [Off-loading] : off-loading [Compression Therapy] : compression therapy [Patient responsibility to plan of care] : patient responsibility to plan of care [Glycemic Control] : glycemic control [] : Yes [Stable] : stable [Home] : Home [Ambulatory] : Ambulatory [Not Applicable - Long Term Care/Home Health Agency] : Long Term Care/Home Health Agency: Not Applicable [Pressure relief] : pressure relief [Hyperbaric Therapy] : hyperbaric therapy [Home Health] : home health

## 2022-06-30 NOTE — PHYSICAL EXAM
[0] : left 0 [1+] : right 1+ [Skin Ulcer] : ulcer [Alert] : alert [Oriented to Person] : oriented to person [Oriented to Place] : oriented to place [Oriented to Time] : oriented to time [Calm] : calm [4 x 4] : 4 x 4  [Abdominal Pad] : Abdominal Pad

## 2022-06-30 NOTE — PROCEDURE
[Saline] : saline [Fibrotic] : fibrotic [Slough] : slough [Necrotic] : necrotic [Scalpel] : scalpel [Sharp scissors] : sharp scissors [Skin] : skin [Fat] : fat [Subcutaneous tissue] : subcutaneous tissue [Sent to Lab] : which was entirely removed and was sent to the laboratory for [Culture and Sensitivity] : culture and sensitivity [Clean] : clean [Pressure] : pressure [] : Yes

## 2022-07-01 ENCOUNTER — OUTPATIENT (OUTPATIENT)
Dept: OUTPATIENT SERVICES | Facility: HOSPITAL | Age: 57
LOS: 1 days | Discharge: HOME | End: 2022-07-01
Payer: MEDICARE

## 2022-07-01 ENCOUNTER — APPOINTMENT (OUTPATIENT)
Dept: HYPERBARIC MEDICINE | Facility: HOSPITAL | Age: 57
End: 2022-07-01

## 2022-07-01 VITALS
SYSTOLIC BLOOD PRESSURE: 170 MMHG | OXYGEN SATURATION: 95 % | DIASTOLIC BLOOD PRESSURE: 77 MMHG | RESPIRATION RATE: 20 BRPM | TEMPERATURE: 97.9 F | HEART RATE: 75 BPM

## 2022-07-01 VITALS
SYSTOLIC BLOOD PRESSURE: 172 MMHG | HEART RATE: 57 BPM | RESPIRATION RATE: 20 BRPM | OXYGEN SATURATION: 99 % | TEMPERATURE: 97.1 F | DIASTOLIC BLOOD PRESSURE: 86 MMHG

## 2022-07-01 DIAGNOSIS — Z89.421 ACQUIRED ABSENCE OF OTHER RIGHT TOE(S): Chronic | ICD-10-CM

## 2022-07-01 DIAGNOSIS — N18.6 END STAGE RENAL DISEASE: Chronic | ICD-10-CM

## 2022-07-01 DIAGNOSIS — Z94.0 KIDNEY TRANSPLANT STATUS: Chronic | ICD-10-CM

## 2022-07-01 DIAGNOSIS — E11.621 TYPE 2 DIABETES MELLITUS WITH FOOT ULCER: ICD-10-CM

## 2022-07-01 LAB — SARS-COV-2 RNA SPEC QL NAA+PROBE: SIGNIFICANT CHANGE UP

## 2022-07-01 PROCEDURE — 82962 GLUCOSE BLOOD TEST: CPT

## 2022-07-01 PROCEDURE — U0003: CPT

## 2022-07-01 PROCEDURE — U0005: CPT

## 2022-07-01 PROCEDURE — 99183 HYPERBARIC OXYGEN THERAPY: CPT

## 2022-07-01 PROCEDURE — G0277: CPT

## 2022-07-01 NOTE — PROCEDURE
[Outpatient] : Outpatient [Ambulatory] : Patient is ambulatory. [THIS CHAMBER HAS BEEN CLEANED / DISINFECTED] : This chamber has been cleaned / disinfected according to local and hospital policy and procedure prior to this treatment. [Patient demonstrated and verbalized proper technique for using air break mask] : Patient demonstrated and verbalized proper technique for using air break mask [Patient educated on the risks of SMOKING prior to HBOT with understanding] : Patient educated on the risks of SMOKING prior to HBOT with understanding [Patient educated on the risks of CONSUMING ALCOHOL prior to HBOT with understanding] : Patient educated on the risks of CONSUMING ALCOHOL prior to HBOT with understanding [100% Cotton] : 100% cotton [Empty all pockets] : empty all pockets [No hair oils, wigs, hairpieces, pins] : no hair oils, wigs, hairpieces, pins  [Pre tx medications] : pre tx medications  [No make-up, creams] : no make-up, creams  [No jewelry] : no jewelry  [No matches, cigarettes, lighters] : no matches, cigarettes, lighters  [Hearing aid removed] : hearing aid removed [Dentures removed] : dentures removed [Ground bracelet on pt's wrist] : ground bracelet on pt's wrist  [Contacts removed] : contacts removed  [Remove nail polish] : remove nail polish  [No reading material] : no reading material  [Bra, undergarments removed] : bra, undergarments removed  [No contraindicated dressings] : no contraindicated dressings [Ground Wire - VISUAL Verification - Intact/Free of Obstruction] : Ground Wire - VISUAL Verification - Intact/Free of Obstruction  [Ground Continuity - Verified < 1 ohm w/ Wrist Strap Gatito] : Ground Continuity - Verified < 1 ohm w/ Wrist Strap Gatito [Number: ___] : Number: [unfilled] [Diagnosis: ___] : Diagnosis: [unfilled] [____] : Post-Dive: Time - [unfilled] [___] : Post-Dive: Value - [unfilled] mg/dL [Clear all fields] : clear all fields [] : No [FreeTextEntry4] : 100 [FreeTextEntry6] : 8:15 [FreeTextEntry8] : 8:25 [de-identified] : 7623 [de-identified] : 3759 [de-identified] : 0224 [de-identified] : 7335 [de-identified] : 1003 [de-identified] : 6195 [de-identified] : 120mins

## 2022-07-01 NOTE — ADDENDUM
[FreeTextEntry1] : PT ARRIVED AMBULATORY A&OX4.\par ALL VITALS WITHIN PARAMETERS FOR HBOT.\par DRAINAGE ASSESSED BY NURSE, WOUND CARE TO FOLLOW HBOT.\par COVID SCREENING TO FOLLOW HBOT.\par INSULIN PORT COVERED PRIOR TO DESCENT.\par PT DESCENT TO RX TX DEPTH IN CHAMBER 3 WAS WITHOUT INICDENT. PT RESTING AT DEPTH, CHEST RISE AND FALL OBSERVED\par Patients Care Transferred to Technician at hrs., and SBAR discussed (Situation Background Assessment Recommendation).\par \par Patients Order Verified Prior to Treatment. \par Patients Secondary Contraindication Examination Check Preformed by CHT. \par Patients Contraindication List and Pre-Check List, Verified and Signed by Technician and CHT Prior to Patients Treatment. \par Patients Legs Elevated via Double Leg Wedge to Offload Patients Wound.\par \par \par Patient resting comfortably @ depth, equal chest rise observed throughout treatment.\par Patient tolerated both air breaks well.\par Patient ascended from 2.4 DEEPAK @ 2.2 PSI/MIN without incident in chamber #3.\par Patient tolerated treatment well.\par Patient Exited the Hyperbaric Suite Safely\par Patients Dressing Changed Post Hyperbaric Treatment by Nurse. \par \par \par

## 2022-07-02 DIAGNOSIS — Z87.81 PERSONAL HISTORY OF (HEALED) TRAUMATIC FRACTURE: ICD-10-CM

## 2022-07-02 DIAGNOSIS — E11.621 TYPE 2 DIABETES MELLITUS WITH FOOT ULCER: ICD-10-CM

## 2022-07-02 DIAGNOSIS — E78.5 HYPERLIPIDEMIA, UNSPECIFIED: ICD-10-CM

## 2022-07-02 DIAGNOSIS — Z79.82 LONG TERM (CURRENT) USE OF ASPIRIN: ICD-10-CM

## 2022-07-02 DIAGNOSIS — Z79.899 OTHER LONG TERM (CURRENT) DRUG THERAPY: ICD-10-CM

## 2022-07-02 DIAGNOSIS — Z20.822 CONTACT WITH AND (SUSPECTED) EXPOSURE TO COVID-19: ICD-10-CM

## 2022-07-02 DIAGNOSIS — E66.01 MORBID (SEVERE) OBESITY DUE TO EXCESS CALORIES: ICD-10-CM

## 2022-07-02 DIAGNOSIS — Z79.4 LONG TERM (CURRENT) USE OF INSULIN: ICD-10-CM

## 2022-07-02 DIAGNOSIS — Z89.422 ACQUIRED ABSENCE OF OTHER LEFT TOE(S): ICD-10-CM

## 2022-07-02 DIAGNOSIS — E11.610 TYPE 2 DIABETES MELLITUS WITH DIABETIC NEUROPATHIC ARTHROPATHY: ICD-10-CM

## 2022-07-02 DIAGNOSIS — E11.51 TYPE 2 DIABETES MELLITUS WITH DIABETIC PERIPHERAL ANGIOPATHY WITHOUT GANGRENE: ICD-10-CM

## 2022-07-02 DIAGNOSIS — Z80.8 FAMILY HISTORY OF MALIGNANT NEOPLASM OF OTHER ORGANS OR SYSTEMS: ICD-10-CM

## 2022-07-02 DIAGNOSIS — G47.33 OBSTRUCTIVE SLEEP APNEA (ADULT) (PEDIATRIC): ICD-10-CM

## 2022-07-02 DIAGNOSIS — Z89.421 ACQUIRED ABSENCE OF OTHER RIGHT TOE(S): ICD-10-CM

## 2022-07-02 DIAGNOSIS — Z80.3 FAMILY HISTORY OF MALIGNANT NEOPLASM OF BREAST: ICD-10-CM

## 2022-07-02 DIAGNOSIS — L84 CORNS AND CALLOSITIES: ICD-10-CM

## 2022-07-02 DIAGNOSIS — Z86.16 PERSONAL HISTORY OF COVID-19: ICD-10-CM

## 2022-07-02 DIAGNOSIS — Z83.3 FAMILY HISTORY OF DIABETES MELLITUS: ICD-10-CM

## 2022-07-02 DIAGNOSIS — Z86.73 PERSONAL HISTORY OF TRANSIENT ISCHEMIC ATTACK (TIA), AND CEREBRAL INFARCTION WITHOUT RESIDUAL DEFICITS: ICD-10-CM

## 2022-07-02 DIAGNOSIS — L97.412 NON-PRESSURE CHRONIC ULCER OF RIGHT HEEL AND MIDFOOT WITH FAT LAYER EXPOSED: ICD-10-CM

## 2022-07-02 DIAGNOSIS — Z85.828 PERSONAL HISTORY OF OTHER MALIGNANT NEOPLASM OF SKIN: ICD-10-CM

## 2022-07-02 DIAGNOSIS — Z94.0 KIDNEY TRANSPLANT STATUS: ICD-10-CM

## 2022-07-02 DIAGNOSIS — Z86.718 PERSONAL HISTORY OF OTHER VENOUS THROMBOSIS AND EMBOLISM: ICD-10-CM

## 2022-07-02 LAB
-  AMPICILLIN: SIGNIFICANT CHANGE UP
-  TETRACYCLINE: SIGNIFICANT CHANGE UP
-  VANCOMYCIN: SIGNIFICANT CHANGE UP
METHOD TYPE: SIGNIFICANT CHANGE UP

## 2022-07-05 ENCOUNTER — APPOINTMENT (OUTPATIENT)
Dept: HYPERBARIC MEDICINE | Facility: HOSPITAL | Age: 57
End: 2022-07-05

## 2022-07-05 ENCOUNTER — OUTPATIENT (OUTPATIENT)
Dept: OUTPATIENT SERVICES | Facility: HOSPITAL | Age: 57
LOS: 1 days | Discharge: HOME | End: 2022-07-05
Payer: MEDICARE

## 2022-07-05 VITALS
RESPIRATION RATE: 20 BRPM | TEMPERATURE: 98.9 F | HEART RATE: 68 BPM | DIASTOLIC BLOOD PRESSURE: 75 MMHG | SYSTOLIC BLOOD PRESSURE: 136 MMHG | OXYGEN SATURATION: 95 %

## 2022-07-05 VITALS
DIASTOLIC BLOOD PRESSURE: 85 MMHG | SYSTOLIC BLOOD PRESSURE: 179 MMHG | HEART RATE: 68 BPM | TEMPERATURE: 98.5 F | OXYGEN SATURATION: 95 % | RESPIRATION RATE: 20 BRPM

## 2022-07-05 DIAGNOSIS — Z89.421 ACQUIRED ABSENCE OF OTHER RIGHT TOE(S): Chronic | ICD-10-CM

## 2022-07-05 DIAGNOSIS — Z79.4 LONG TERM (CURRENT) USE OF INSULIN: ICD-10-CM

## 2022-07-05 DIAGNOSIS — E11.621 TYPE 2 DIABETES MELLITUS WITH FOOT ULCER: ICD-10-CM

## 2022-07-05 DIAGNOSIS — N18.6 END STAGE RENAL DISEASE: Chronic | ICD-10-CM

## 2022-07-05 DIAGNOSIS — Z94.0 KIDNEY TRANSPLANT STATUS: Chronic | ICD-10-CM

## 2022-07-05 DIAGNOSIS — L97.412 NON-PRESSURE CHRONIC ULCER OF RIGHT HEEL AND MIDFOOT WITH FAT LAYER EXPOSED: ICD-10-CM

## 2022-07-05 LAB
CULTURE RESULTS: SIGNIFICANT CHANGE UP
ORGANISM # SPEC MICROSCOPIC CNT: SIGNIFICANT CHANGE UP
ORGANISM # SPEC MICROSCOPIC CNT: SIGNIFICANT CHANGE UP
SPECIMEN SOURCE: SIGNIFICANT CHANGE UP

## 2022-07-05 PROCEDURE — G0277: CPT

## 2022-07-05 PROCEDURE — 99183 HYPERBARIC OXYGEN THERAPY: CPT

## 2022-07-05 PROCEDURE — 82962 GLUCOSE BLOOD TEST: CPT

## 2022-07-05 NOTE — ADDENDUM
[FreeTextEntry1] : Patients Order Verified Prior to Treatment. \par Patients Secondary Contraindication Examination Check Preformed by CHT. \par Patients Contraindication List and Pre-Check List, Verified and Signed by Technician and CHT Prior to Patients Treatment. \par Patient descended to 2.4 DEEPAK @ 2.2 PSI/MIN without incident in chamber #3.\par Patient resting comfortably @ depth, equal chest rise observed throughout treatment.\par Patient tolerated both air breaks well.\par Patient ascended from 2.4 DEEPAK @ 2.2 PSI/MIN without incident in chamber #3.\par Patient tolerated treatment well.\par Patient Exited the Hyperbaric Suite Safely\par Patients Dressing Changed Post Hyperbaric Treatment by Nurse. \par \par

## 2022-07-05 NOTE — PROCEDURE
No intervention [Outpatient] : Outpatient [Ambulatory] : Patient is ambulatory. [THIS CHAMBER HAS BEEN CLEANED / DISINFECTED] : This chamber has been cleaned / disinfected according to local and hospital policy and procedure prior to this treatment. [Patient demonstrated and verbalized proper technique for using air break mask] : Patient demonstrated and verbalized proper technique for using air break mask [Patient educated on the risks of SMOKING prior to HBOT with understanding] : Patient educated on the risks of SMOKING prior to HBOT with understanding [Patient educated on the risks of CONSUMING ALCOHOL prior to HBOT with understanding] : Patient educated on the risks of CONSUMING ALCOHOL prior to HBOT with understanding [100% Cotton] : 100% cotton [Empty all pockets] : empty all pockets [No hair oils, wigs, hairpieces, pins] : no hair oils, wigs, hairpieces, pins  [Pre tx medications] : pre tx medications  [No make-up, creams] : no make-up, creams  [No jewelry] : no jewelry  [No matches, cigarettes, lighters] : no matches, cigarettes, lighters  [Hearing aid removed] : hearing aid removed [Dentures removed] : dentures removed [Ground bracelet on pt's wrist] : ground bracelet on pt's wrist  [Contacts removed] : contacts removed  [Remove nail polish] : remove nail polish  [No reading material] : no reading material  [Bra, undergarments removed] : bra, undergarments removed  [No contraindicated dressings] : no contraindicated dressings [Ground Wire - VISUAL Verification - Intact/Free of Obstruction] : Ground Wire - VISUAL Verification - Intact/Free of Obstruction  [Ground Continuity - Verified < 1 ohm w/ Wrist Strap Gatito] : Ground Continuity - Verified < 1 ohm w/ Wrist Strap Gatito [Number: ___] : Number: [unfilled] [Diagnosis: ___] : Diagnosis: [unfilled] [____] : Post-Dive: Time - [unfilled] [___] : Post-Dive: Value - [unfilled] mg/dL [Clear all fields] : clear all fields [] : No [FreeTextEntry4] : 100 [FreeTextEntry6] : 6344 [FreeTextEntry8] : 4164 [de-identified] : 1008 [de-identified] : 2318 [de-identified] : 0955 [de-identified] : 4753 [de-identified] : 1010 [de-identified] : 1023 [de-identified] : 120mins

## 2022-07-06 ENCOUNTER — APPOINTMENT (OUTPATIENT)
Dept: HYPERBARIC MEDICINE | Facility: HOSPITAL | Age: 57
End: 2022-07-06

## 2022-07-06 ENCOUNTER — NON-APPOINTMENT (OUTPATIENT)
Age: 57
End: 2022-07-06

## 2022-07-07 ENCOUNTER — APPOINTMENT (OUTPATIENT)
Dept: HYPERBARIC MEDICINE | Facility: HOSPITAL | Age: 57
End: 2022-07-07

## 2022-07-07 ENCOUNTER — OUTPATIENT (OUTPATIENT)
Dept: OUTPATIENT SERVICES | Facility: HOSPITAL | Age: 57
LOS: 1 days | Discharge: HOME | End: 2022-07-07
Payer: MEDICARE

## 2022-07-07 VITALS
RESPIRATION RATE: 20 BRPM | TEMPERATURE: 97.9 F | OXYGEN SATURATION: 99 % | DIASTOLIC BLOOD PRESSURE: 81 MMHG | SYSTOLIC BLOOD PRESSURE: 148 MMHG | HEART RATE: 57 BPM

## 2022-07-07 VITALS
DIASTOLIC BLOOD PRESSURE: 79 MMHG | SYSTOLIC BLOOD PRESSURE: 155 MMHG | HEART RATE: 63 BPM | TEMPERATURE: 98.3 F | OXYGEN SATURATION: 95 % | RESPIRATION RATE: 20 BRPM

## 2022-07-07 DIAGNOSIS — E11.621 TYPE 2 DIABETES MELLITUS WITH FOOT ULCER: ICD-10-CM

## 2022-07-07 DIAGNOSIS — Z89.421 ACQUIRED ABSENCE OF OTHER RIGHT TOE(S): Chronic | ICD-10-CM

## 2022-07-07 DIAGNOSIS — Z79.4 LONG TERM (CURRENT) USE OF INSULIN: ICD-10-CM

## 2022-07-07 DIAGNOSIS — L97.412 NON-PRESSURE CHRONIC ULCER OF RIGHT HEEL AND MIDFOOT WITH FAT LAYER EXPOSED: ICD-10-CM

## 2022-07-07 DIAGNOSIS — N18.6 END STAGE RENAL DISEASE: Chronic | ICD-10-CM

## 2022-07-07 DIAGNOSIS — Z94.0 KIDNEY TRANSPLANT STATUS: Chronic | ICD-10-CM

## 2022-07-07 PROCEDURE — 82962 GLUCOSE BLOOD TEST: CPT

## 2022-07-07 PROCEDURE — 99183 HYPERBARIC OXYGEN THERAPY: CPT

## 2022-07-07 PROCEDURE — G0277: CPT

## 2022-07-07 NOTE — PROCEDURE
[Outpatient] : Outpatient [Ambulatory] : Patient is ambulatory. [THIS CHAMBER HAS BEEN CLEANED / DISINFECTED] : This chamber has been cleaned / disinfected according to local and hospital policy and procedure prior to this treatment. [Patient demonstrated and verbalized proper technique for using air break mask] : Patient demonstrated and verbalized proper technique for using air break mask [Patient educated on the risks of SMOKING prior to HBOT with understanding] : Patient educated on the risks of SMOKING prior to HBOT with understanding [Patient educated on the risks of CONSUMING ALCOHOL prior to HBOT with understanding] : Patient educated on the risks of CONSUMING ALCOHOL prior to HBOT with understanding [100% Cotton] : 100% cotton [Empty all pockets] : empty all pockets [No hair oils, wigs, hairpieces, pins] : no hair oils, wigs, hairpieces, pins  [Pre tx medications] : pre tx medications  [No make-up, creams] : no make-up, creams  [No jewelry] : no jewelry  [No matches, cigarettes, lighters] : no matches, cigarettes, lighters  [Hearing aid removed] : hearing aid removed [Dentures removed] : dentures removed [Ground bracelet on pt's wrist] : ground bracelet on pt's wrist  [Contacts removed] : contacts removed  [Remove nail polish] : remove nail polish  [No reading material] : no reading material  [Bra, undergarments removed] : bra, undergarments removed  [No contraindicated dressings] : no contraindicated dressings [Ground Wire - VISUAL Verification - Intact/Free of Obstruction] : Ground Wire - VISUAL Verification - Intact/Free of Obstruction  [Ground Continuity - Verified < 1 ohm w/ Wrist Strap Gatito] : Ground Continuity - Verified < 1 ohm w/ Wrist Strap Gatito [Number: ___] : Number: [unfilled] [Diagnosis: ___] : Diagnosis: [unfilled] [____] : Post-Dive: Time - [unfilled] [___] : Post-Dive: Value - [unfilled] mg/dL [Clear all fields] : clear all fields [] : No [FreeTextEntry4] : 100 [FreeTextEntry6] : 8691 [FreeTextEntry8] : 9595 [de-identified] : 7214 [de-identified] : 8682 [de-identified] : 8832 [de-identified] : 102 [de-identified] : 1051 [de-identified] : 1100 [de-identified] : 120 MINUTES

## 2022-07-07 NOTE — ADDENDUM
[FreeTextEntry1] : PTS PRE PROCEDURAL CHECKLIST WAS DONE BY CHT/CHRN AND HT SUCCESSFULLY PRE HBOT\par PTS BGL WAS NOT WITHIN PARAMETERS PRE HBOT, PT GIVEN 16 OZ OF GLUCOSE VIA 2 JUICES. PTS BGL WAS NOT WITHIN ACCEPTABLE LIMITS FOR HBOT, PT GIVEN 16 OZ OF SUGAR VIA 2 JUICES. PTS BGL NOT WITHIN ACCEPTABLE LIMITS, PT REQUESTED IMMEDIATE RETEST AND GLUCOSE WAS WITHIN ACCEPTABLE LIMITS FOR HBOT\par PT RECEIVED WOUND CARE PRE HBOT BY LPN \par PT DESCENDED TO 2.4 DEEPAK @ 2.2 PSI/MIN WITHOUT INCIDENT IN CHAMBER #3\par PT RESTING AT TX DEPTH WITH VISIBLE CHEST RISE AND FALL OBSERVED CHAMBERSIDE \par PT TOLERATED BOTH AIR BREAKS WELL \par PT ASCENDED FROM TX DEPTH WITHOUT INCIDENT IN CHAMBER #3\par PT RECEIVED ACE WRAP BY RN PRE HBOT\par PT TOLERATED TX WELL \par

## 2022-07-08 ENCOUNTER — OUTPATIENT (OUTPATIENT)
Dept: OUTPATIENT SERVICES | Facility: HOSPITAL | Age: 57
LOS: 1 days | Discharge: HOME | End: 2022-07-08
Payer: MEDICARE

## 2022-07-08 ENCOUNTER — APPOINTMENT (OUTPATIENT)
Dept: HYPERBARIC MEDICINE | Facility: HOSPITAL | Age: 57
End: 2022-07-08

## 2022-07-08 VITALS
HEART RATE: 62 BPM | DIASTOLIC BLOOD PRESSURE: 77 MMHG | SYSTOLIC BLOOD PRESSURE: 166 MMHG | TEMPERATURE: 98.2 F | OXYGEN SATURATION: 96 % | RESPIRATION RATE: 18 BRPM

## 2022-07-08 VITALS
RESPIRATION RATE: 18 BRPM | OXYGEN SATURATION: 96 % | SYSTOLIC BLOOD PRESSURE: 123 MMHG | DIASTOLIC BLOOD PRESSURE: 74 MMHG | TEMPERATURE: 97.8 F | HEART RATE: 61 BPM

## 2022-07-08 DIAGNOSIS — E11.621 TYPE 2 DIABETES MELLITUS WITH FOOT ULCER: ICD-10-CM

## 2022-07-08 DIAGNOSIS — Z94.0 KIDNEY TRANSPLANT STATUS: Chronic | ICD-10-CM

## 2022-07-08 DIAGNOSIS — L97.412 NON-PRESSURE CHRONIC ULCER OF RIGHT HEEL AND MIDFOOT WITH FAT LAYER EXPOSED: ICD-10-CM

## 2022-07-08 DIAGNOSIS — Z79.4 LONG TERM (CURRENT) USE OF INSULIN: ICD-10-CM

## 2022-07-08 DIAGNOSIS — N18.6 END STAGE RENAL DISEASE: Chronic | ICD-10-CM

## 2022-07-08 DIAGNOSIS — Z89.421 ACQUIRED ABSENCE OF OTHER RIGHT TOE(S): Chronic | ICD-10-CM

## 2022-07-08 LAB — SARS-COV-2 RNA SPEC QL NAA+PROBE: SIGNIFICANT CHANGE UP

## 2022-07-08 PROCEDURE — 82962 GLUCOSE BLOOD TEST: CPT

## 2022-07-08 PROCEDURE — U0005: CPT

## 2022-07-08 PROCEDURE — G0277: CPT

## 2022-07-08 PROCEDURE — 99183 HYPERBARIC OXYGEN THERAPY: CPT

## 2022-07-08 PROCEDURE — U0003: CPT

## 2022-07-08 NOTE — ADDENDUM
[FreeTextEntry1] : PT ARRIVED A&OX3 \par ALL VITALS WITHIN PARAMETERS FOR HBOT\par SMALL DRAINAGE NOTED ON DRESSING PRE HBOT CHT\par PT INSULIN PORT COVERED WITH ABD PAD AND PAPER TAPE PRE HBOT \par PT PRE-DIVE CHECKLIST SIGNED AND WITNESSED BY CHT\par PT DESCENDED TO 2.4 DEEPAK @ 2.2 PSI/MIN IN CHAMBER #1  WITHOUT INCIDENT\par PT RESTING AT TX DEPTH WITH VISIBLE CHEST RISE AND FALL OBSERVED CHAMBER SIDE\par PT TOLERATED AIR BREAKS WELL\par PT ASCENDED FROM 2.4 DEEPAK @ 2.2 PSI/MIN WITHOUT INCIDENT\par PT TOLERATED TX WELL\par

## 2022-07-08 NOTE — PROCEDURE
[Outpatient] : Outpatient [Ambulatory] : Patient is ambulatory. [THIS CHAMBER HAS BEEN CLEANED / DISINFECTED] : This chamber has been cleaned / disinfected according to local and hospital policy and procedure prior to this treatment. [Patient demonstrated and verbalized proper technique for using air break mask] : Patient demonstrated and verbalized proper technique for using air break mask [Patient educated on the risks of SMOKING prior to HBOT with understanding] : Patient educated on the risks of SMOKING prior to HBOT with understanding [Patient educated on the risks of CONSUMING ALCOHOL prior to HBOT with understanding] : Patient educated on the risks of CONSUMING ALCOHOL prior to HBOT with understanding [100% Cotton] : 100% cotton [Empty all pockets] : empty all pockets [No hair oils, wigs, hairpieces, pins] : no hair oils, wigs, hairpieces, pins  [Pre tx medications] : pre tx medications  [No make-up, creams] : no make-up, creams  [No jewelry] : no jewelry  [No matches, cigarettes, lighters] : no matches, cigarettes, lighters  [Hearing aid removed] : hearing aid removed [Dentures removed] : dentures removed [Ground bracelet on pt's wrist] : ground bracelet on pt's wrist  [Contacts removed] : contacts removed  [Remove nail polish] : remove nail polish  [No reading material] : no reading material  [Bra, undergarments removed] : bra, undergarments removed  [No contraindicated dressings] : no contraindicated dressings [Ground Wire - VISUAL Verification - Intact/Free of Obstruction] : Ground Wire - VISUAL Verification - Intact/Free of Obstruction  [Ground Continuity - Verified < 1 ohm w/ Wrist Strap Gatito] : Ground Continuity - Verified < 1 ohm w/ Wrist Strap Gatito [Number: ___] : Number: [unfilled] [Diagnosis: ___] : Diagnosis: [unfilled] [____] : Post-Dive: Time - [unfilled] [___] : Post-Dive: Value - [unfilled] mg/dL [Clear all fields] : clear all fields [] : No [FreeTextEntry4] : 100 [FreeTextEntry6] : 8483 [FreeTextEntry8] : 6848 [de-identified] : 3062 [de-identified] : 0955 [de-identified] : 3231 [de-identified] : 1008 [de-identified] : 1010 [de-identified] : 1029 [de-identified] : 120 MINUTES

## 2022-07-09 ENCOUNTER — OUTPATIENT (OUTPATIENT)
Dept: OUTPATIENT SERVICES | Facility: HOSPITAL | Age: 57
LOS: 1 days | End: 2022-07-09
Payer: MEDICARE

## 2022-07-09 ENCOUNTER — APPOINTMENT (OUTPATIENT)
Dept: HYPERBARIC MEDICINE | Facility: HOSPITAL | Age: 57
End: 2022-07-09

## 2022-07-09 VITALS
DIASTOLIC BLOOD PRESSURE: 78 MMHG | TEMPERATURE: 98.6 F | HEART RATE: 76 BPM | OXYGEN SATURATION: 98 % | RESPIRATION RATE: 18 BRPM | SYSTOLIC BLOOD PRESSURE: 132 MMHG

## 2022-07-09 VITALS
HEART RATE: 52 BPM | TEMPERATURE: 97.7 F | RESPIRATION RATE: 20 BRPM | OXYGEN SATURATION: 96 % | DIASTOLIC BLOOD PRESSURE: 75 MMHG | SYSTOLIC BLOOD PRESSURE: 149 MMHG

## 2022-07-09 DIAGNOSIS — E11.621 TYPE 2 DIABETES MELLITUS WITH FOOT ULCER: ICD-10-CM

## 2022-07-09 DIAGNOSIS — Z94.0 KIDNEY TRANSPLANT STATUS: Chronic | ICD-10-CM

## 2022-07-09 DIAGNOSIS — Z89.421 ACQUIRED ABSENCE OF OTHER RIGHT TOE(S): Chronic | ICD-10-CM

## 2022-07-09 DIAGNOSIS — N18.6 END STAGE RENAL DISEASE: Chronic | ICD-10-CM

## 2022-07-09 PROCEDURE — 99183 HYPERBARIC OXYGEN THERAPY: CPT

## 2022-07-09 PROCEDURE — 82962 GLUCOSE BLOOD TEST: CPT

## 2022-07-09 NOTE — ADDENDUM
[FreeTextEntry1] : Pt arrived axox3\par all pt vitals within parameters for HBOT\par pt secondary check performed and negative\par pt descended to tx depth of 2.4ATA @2.2psi/min in chamber 4 without incident\par pt seen resting at depth with visible chest rise and fall observed\par pt seen tolerating airbreaks without incident\par pt ascended to surface pressure without incident\par pt received wound care post tx

## 2022-07-09 NOTE — PROCEDURE
[Outpatient] : Outpatient [Ambulatory] : Patient is ambulatory. [Patient demonstrated and verbalized proper technique for using air break mask] : Patient demonstrated and verbalized proper technique for using air break mask [Patient educated on the risks of SMOKING prior to HBOT with understanding] : Patient educated on the risks of SMOKING prior to HBOT with understanding [Patient educated on the risks of CONSUMING ALCOHOL prior to HBOT with understanding] : Patient educated on the risks of CONSUMING ALCOHOL prior to HBOT with understanding [100% Cotton] : 100% cotton [Empty all pockets] : empty all pockets [No hair oils, wigs, hairpieces, pins] : no hair oils, wigs, hairpieces, pins  [Pre tx medications] : pre tx medications  [No jewelry] : no jewelry  [No make-up, creams] : no make-up, creams  [No matches, cigarettes, lighters] : no matches, cigarettes, lighters  [Hearing aid removed] : hearing aid removed [Dentures removed] : dentures removed [Ground bracelet on pt's wrist] : ground bracelet on pt's wrist  [Contacts removed] : contacts removed  [Remove nail polish] : remove nail polish  [No reading material] : no reading material  [Bra, undergarments removed] : bra, undergarments removed  [No contraindicated dressings] : no contraindicated dressings [Ground Wire - VISUAL Verification - Intact/Free of Obstruction] : Ground Wire - VISUAL Verification - Intact/Free of Obstruction  [Ground Continuity - Verified < 1 ohm w/ Wrist Strap Gatito] : Ground Continuity - Verified < 1 ohm w/ Wrist Strap Gatito [Number: ___] : Number: [unfilled] [Diagnosis: ___] : Diagnosis: [unfilled] [____] : Post-Dive: Time - [unfilled] [___] : Post-Dive: Value - [unfilled] mg/dL [Clear all fields] : clear all fields [] : No [FreeTextEntry4] : 100 Min [FreeTextEntry6] : 1046 [FreeTextEntry8] : 9525 [de-identified] : 5657 [de-identified] : 112 [de-identified] : 5097 [de-identified] : 1200 [de-identified] : 5768 [de-identified] : 6086 [de-identified] : 120 Min

## 2022-07-11 ENCOUNTER — APPOINTMENT (OUTPATIENT)
Dept: HYPERBARIC MEDICINE | Facility: HOSPITAL | Age: 57
End: 2022-07-11

## 2022-07-11 ENCOUNTER — OUTPATIENT (OUTPATIENT)
Dept: OUTPATIENT SERVICES | Facility: HOSPITAL | Age: 57
LOS: 1 days | Discharge: HOME | End: 2022-07-11
Payer: MEDICARE

## 2022-07-11 VITALS
OXYGEN SATURATION: 96 % | HEART RATE: 61 BPM | DIASTOLIC BLOOD PRESSURE: 77 MMHG | RESPIRATION RATE: 20 BRPM | SYSTOLIC BLOOD PRESSURE: 169 MMHG | TEMPERATURE: 98 F

## 2022-07-11 VITALS
SYSTOLIC BLOOD PRESSURE: 147 MMHG | TEMPERATURE: 97 F | RESPIRATION RATE: 18 BRPM | OXYGEN SATURATION: 99 % | DIASTOLIC BLOOD PRESSURE: 84 MMHG | HEART RATE: 56 BPM

## 2022-07-11 DIAGNOSIS — N18.6 END STAGE RENAL DISEASE: Chronic | ICD-10-CM

## 2022-07-11 DIAGNOSIS — E11.621 TYPE 2 DIABETES MELLITUS WITH FOOT ULCER: ICD-10-CM

## 2022-07-11 DIAGNOSIS — Z94.0 KIDNEY TRANSPLANT STATUS: Chronic | ICD-10-CM

## 2022-07-11 DIAGNOSIS — Z79.4 LONG TERM (CURRENT) USE OF INSULIN: ICD-10-CM

## 2022-07-11 DIAGNOSIS — Z89.421 ACQUIRED ABSENCE OF OTHER RIGHT TOE(S): Chronic | ICD-10-CM

## 2022-07-11 DIAGNOSIS — L97.412 NON-PRESSURE CHRONIC ULCER OF RIGHT HEEL AND MIDFOOT WITH FAT LAYER EXPOSED: ICD-10-CM

## 2022-07-11 PROCEDURE — 82962 GLUCOSE BLOOD TEST: CPT

## 2022-07-11 PROCEDURE — G0277: CPT

## 2022-07-11 PROCEDURE — 99183 HYPERBARIC OXYGEN THERAPY: CPT

## 2022-07-11 NOTE — ADDENDUM
[FreeTextEntry1] : Pt arrived ambulatory a&ox4.\par All vitals within parameters for hbot.\par [place] roll placed over foot bar for skin protection.\par Wedge provided for additional head elevation throughout tx.\par Drainage assessed by nurse prior to descent. Wound care to follow hbot..\par Insulin pump port covered w/ abd and paper tape prior to descent.\par Pt descent to Rx tx depth in chamber 1 was without incident. Pt resting at depth, chest rise and fall observed.\par [facility] at the start of the pt's ascent.\par The pt. ascended from tx. depth to surface without incident.\par The pt. tolerated HBOT without incident.\par The pt. received wound care x RN post-HBOT.

## 2022-07-12 ENCOUNTER — APPOINTMENT (OUTPATIENT)
Dept: HYPERBARIC MEDICINE | Facility: HOSPITAL | Age: 57
End: 2022-07-12

## 2022-07-12 ENCOUNTER — OUTPATIENT (OUTPATIENT)
Dept: OUTPATIENT SERVICES | Facility: HOSPITAL | Age: 57
LOS: 1 days | Discharge: HOME | End: 2022-07-12
Payer: MEDICARE

## 2022-07-12 VITALS
OXYGEN SATURATION: 96 % | TEMPERATURE: 97.6 F | SYSTOLIC BLOOD PRESSURE: 147 MMHG | RESPIRATION RATE: 20 BRPM | DIASTOLIC BLOOD PRESSURE: 77 MMHG | HEART RATE: 56 BPM

## 2022-07-12 VITALS
HEART RATE: 53 BPM | DIASTOLIC BLOOD PRESSURE: 74 MMHG | SYSTOLIC BLOOD PRESSURE: 141 MMHG | OXYGEN SATURATION: 98 % | TEMPERATURE: 98 F | RESPIRATION RATE: 18 BRPM

## 2022-07-12 DIAGNOSIS — N18.6 END STAGE RENAL DISEASE: Chronic | ICD-10-CM

## 2022-07-12 DIAGNOSIS — L97.412 NON-PRESSURE CHRONIC ULCER OF RIGHT HEEL AND MIDFOOT WITH FAT LAYER EXPOSED: ICD-10-CM

## 2022-07-12 DIAGNOSIS — E11.621 TYPE 2 DIABETES MELLITUS WITH FOOT ULCER: ICD-10-CM

## 2022-07-12 DIAGNOSIS — Z79.4 LONG TERM (CURRENT) USE OF INSULIN: ICD-10-CM

## 2022-07-12 DIAGNOSIS — Z89.421 ACQUIRED ABSENCE OF OTHER RIGHT TOE(S): Chronic | ICD-10-CM

## 2022-07-12 DIAGNOSIS — Z94.0 KIDNEY TRANSPLANT STATUS: Chronic | ICD-10-CM

## 2022-07-12 PROCEDURE — 82962 GLUCOSE BLOOD TEST: CPT

## 2022-07-12 PROCEDURE — 99183 HYPERBARIC OXYGEN THERAPY: CPT

## 2022-07-12 PROCEDURE — G0277: CPT

## 2022-07-12 NOTE — ADDENDUM
[FreeTextEntry1] : The pt. presented to [facility] ambulatory and A&Ox3 for scheduled HBOT.\par \par The pt's wound dressing was observed to be soiled with large area of serous drainage on the plantar aspect of the foot. \par RN was advised. \par Per RN, the pt. will receive dressing change post-HBOT. \par \par The pt. was offered and declined off-loading/elevation/positional measures prior to start of HBOT.\par \par The pt's pre-dive screening was found to be within acceptable limits to begin HBOT.\par "HBO-TO" secondary PDS found the pt. fit to begin HBOTx2.\par The pt. descended @ 2.2 PSI/min. to Rx'd HBOT tx. depth of 2.4 DEEPAK in chamber # 3 without incident.\par The pt. was observed with visible chest motion and without incident for the duration of HBOT. \par The pt. was administered intermittent med. air over course of HBOT without incident.\par The pt. ascended from tx. depth to surface without incident.\par The pt. toelrated HBOT without incident.\par The pt. received wound care post-HBOT.\par The pt. was discharged from HBO suite ambulatory and accompanied by [facility] team member throughout remaining travel within [facility].

## 2022-07-12 NOTE — PROCEDURE
[Outpatient] : Outpatient [Ambulatory] : Patient is ambulatory. [THIS CHAMBER HAS BEEN CLEANED / DISINFECTED] : This chamber has been cleaned / disinfected according to local and hospital policy and procedure prior to this treatment. [Patient demonstrated and verbalized proper technique for using air break mask] : Patient demonstrated and verbalized proper technique for using air break mask [Patient educated on the risks of SMOKING prior to HBOT with understanding] : Patient educated on the risks of SMOKING prior to HBOT with understanding [Patient educated on the risks of CONSUMING ALCOHOL prior to HBOT with understanding] : Patient educated on the risks of CONSUMING ALCOHOL prior to HBOT with understanding [100% Cotton] : 100% cotton [Empty all pockets] : empty all pockets [No hair oils, wigs, hairpieces, pins] : no hair oils, wigs, hairpieces, pins  [Pre tx medications] : pre tx medications  [No make-up, creams] : no make-up, creams  [No jewelry] : no jewelry  [No matches, cigarettes, lighters] : no matches, cigarettes, lighters  [Hearing aid removed] : hearing aid removed [Dentures removed] : dentures removed [Ground bracelet on pt's wrist] : ground bracelet on pt's wrist  [Contacts removed] : contacts removed  [Remove nail polish] : remove nail polish  [No reading material] : no reading material  [Bra, undergarments removed] : bra, undergarments removed  [No contraindicated dressings] : no contraindicated dressings [Ground Wire - VISUAL Verification - Intact/Free of Obstruction] : Ground Wire - VISUAL Verification - Intact/Free of Obstruction  [Ground Continuity - Verified < 1 ohm w/ Wrist Strap Gatito] : Ground Continuity - Verified < 1 ohm w/ Wrist Strap Gatito [Number: ___] : Number: [unfilled] [Diagnosis: ___] : Diagnosis: [unfilled] [____] : Post-Dive: Time - [unfilled] [___] : Post-Dive: Value - [unfilled] mg/dL [Clear all fields] : clear all fields [] : No [FreeTextEntry2] : MD Rx Verified [Hard Copy] [FreeTextEntry4] : 100 [FreeTextEntry6] : 08 : 20 [FreeTextEntry8] : 08 : 30 [de-identified] : 09 : 00 [de-identified] : 09 : 05 [de-identified] : 09 : 35 [de-identified] : 09 : 40 [de-identified] : 10 : 10 [de-identified] : 10 : 20 [de-identified] : 120 min.

## 2022-07-13 ENCOUNTER — OUTPATIENT (OUTPATIENT)
Dept: OUTPATIENT SERVICES | Facility: HOSPITAL | Age: 57
LOS: 1 days | Discharge: HOME | End: 2022-07-13
Payer: MEDICARE

## 2022-07-13 ENCOUNTER — APPOINTMENT (OUTPATIENT)
Dept: HYPERBARIC MEDICINE | Facility: HOSPITAL | Age: 57
End: 2022-07-13

## 2022-07-13 VITALS
OXYGEN SATURATION: 96 % | TEMPERATURE: 97.7 F | HEART RATE: 55 BPM | RESPIRATION RATE: 20 BRPM | DIASTOLIC BLOOD PRESSURE: 86 MMHG | SYSTOLIC BLOOD PRESSURE: 156 MMHG

## 2022-07-13 VITALS
DIASTOLIC BLOOD PRESSURE: 79 MMHG | RESPIRATION RATE: 20 BRPM | HEART RATE: 57 BPM | TEMPERATURE: 97.9 F | SYSTOLIC BLOOD PRESSURE: 133 MMHG | OXYGEN SATURATION: 97 %

## 2022-07-13 DIAGNOSIS — N18.6 END STAGE RENAL DISEASE: Chronic | ICD-10-CM

## 2022-07-13 DIAGNOSIS — Z94.0 KIDNEY TRANSPLANT STATUS: Chronic | ICD-10-CM

## 2022-07-13 DIAGNOSIS — L97.412 NON-PRESSURE CHRONIC ULCER OF RIGHT HEEL AND MIDFOOT WITH FAT LAYER EXPOSED: ICD-10-CM

## 2022-07-13 DIAGNOSIS — Z79.4 LONG TERM (CURRENT) USE OF INSULIN: ICD-10-CM

## 2022-07-13 DIAGNOSIS — E11.621 TYPE 2 DIABETES MELLITUS WITH FOOT ULCER: ICD-10-CM

## 2022-07-13 DIAGNOSIS — Z89.421 ACQUIRED ABSENCE OF OTHER RIGHT TOE(S): Chronic | ICD-10-CM

## 2022-07-13 PROCEDURE — 99183 HYPERBARIC OXYGEN THERAPY: CPT

## 2022-07-13 PROCEDURE — G0277: CPT

## 2022-07-13 PROCEDURE — 82962 GLUCOSE BLOOD TEST: CPT

## 2022-07-13 NOTE — PROCEDURE
[Outpatient] : Outpatient [Ambulatory] : Patient is ambulatory. [THIS CHAMBER HAS BEEN CLEANED / DISINFECTED] : This chamber has been cleaned / disinfected according to local and hospital policy and procedure prior to this treatment. [Patient demonstrated and verbalized proper technique for using air break mask] : Patient demonstrated and verbalized proper technique for using air break mask [Patient educated on the risks of SMOKING prior to HBOT with understanding] : Patient educated on the risks of SMOKING prior to HBOT with understanding [Patient educated on the risks of CONSUMING ALCOHOL prior to HBOT with understanding] : Patient educated on the risks of CONSUMING ALCOHOL prior to HBOT with understanding [100% Cotton] : 100% cotton [Empty all pockets] : empty all pockets [No hair oils, wigs, hairpieces, pins] : no hair oils, wigs, hairpieces, pins  [Pre tx medications] : pre tx medications  [No make-up, creams] : no make-up, creams  [No jewelry] : no jewelry  [No matches, cigarettes, lighters] : no matches, cigarettes, lighters  [Hearing aid removed] : hearing aid removed [Dentures removed] : dentures removed [Ground bracelet on pt's wrist] : ground bracelet on pt's wrist  [Contacts removed] : contacts removed  [Remove nail polish] : remove nail polish  [No reading material] : no reading material  [Bra, undergarments removed] : bra, undergarments removed  [No contraindicated dressings] : no contraindicated dressings [Ground Wire - VISUAL Verification - Intact/Free of Obstruction] : Ground Wire - VISUAL Verification - Intact/Free of Obstruction  [Ground Continuity - Verified < 1 ohm w/ Wrist Strap Gatito] : Ground Continuity - Verified < 1 ohm w/ Wrist Strap Gatito [Number: ___] : Number: [unfilled] [Diagnosis: ___] : Diagnosis: [unfilled] [____] : Post-Dive: Time - [unfilled] [___] : Post-Dive: Value - [unfilled] mg/dL [Clear all fields] : clear all fields [] : No [FreeTextEntry4] : 100 min [FreeTextEntry6] : 8:15 [FreeTextEntry8] : 8:25 [de-identified] : 8:55 [de-identified] : 9:00 [de-identified] : 9:30 [de-identified] : 9:35 [de-identified] : 10:05 [de-identified] : 10:15 [de-identified] : 120 min

## 2022-07-13 NOTE — PROCEDURE
[FreeTextEntry2] : DFU  3 right  [FreeTextEntry1] : silver alginate  [] : No [FreeTextEntry9] : 1001 [de-identified] : DFU 3 plantar right 5th met base  [de-identified] : Teodora [FreeTextEntry6] : DFU 3 plantar base right 5th metatarsal  [FreeTextEntry7] : same [de-identified] : 0 [de-identified] : 5cc [de-identified] : slough , fibrin and necrotic tissue

## 2022-07-13 NOTE — REVIEW OF SYSTEMS
[FreeTextEntry1] : 5511 [Fever] : no fever [Chills] : no chills [Eye Pain] : no eye pain [Loss Of Hearing] : no hearing loss [Shortness Of Breath] : no shortness of breath [Abdominal Pain] : no abdominal pain [Vomiting] : no vomiting [Skin Lesions] : no skin lesions [Skin Wound] : no skin wound [Anxiety] : no anxiety [Easy Bleeding] : no tendency for easy bleeding [FreeTextEntry5] : HTN,HLD [FreeTextEntry7] : 40.9 BMI , morbid obesity  [FreeTextEntry8] : Kidney Transplant  [FreeTextEntry9] : Associated Diabetic Charcot foot  [de-identified] : plantar right foot midfoot , lateral  DFU 2  , large area of peripheral callus build up , the ulcer is clean and granular s/p surgical intervention , sutures intact - soi  [de-identified] : IDDM with neuropathy  [de-identified] : MARILUZ

## 2022-07-13 NOTE — ADDENDUM
[FreeTextEntry1] : PT ARRIVED AMBULATORY A&OX4.\par ALL VITAS WITHIN PARAMETERS FOR HBOT.\par SCANT DRY DRAINAGE ON DRESSING PRIOR TO DESCENT. WOUND CARE TO FOLLOW HBOT.\par PT DECLINED WEDGE FOR ADDITIONAL HEAD ELEVATION.\par BLANKET ROLL PLACED OVER FOOT BAR FOR SKIN PROTECTION.\par PT DESCENT TO RX TX DEPTH IN CHAMBER 1 WAS WITHOUT INCIDENT.\par PT RESTING AT DEPTH, CHEST RISE AND FALL OBSERVED.\par PT TOLERATED AIR BREAKS WELL.\par PT ASCENT WAS WITHOUT INCIDENT. PT TOLERATED HBOT WELL.\par PT CONFIRMED THAT HE WILL NOT RECEIVE HBOT ON 07/14/22 DUE TO SCHEDULED ASSESSMENT.\par CHT HERMELINDO GIRALDO 07/13/22

## 2022-07-13 NOTE — PHYSICAL EXAM
[Varicose Veins Of Lower Extremities] : not present [] : not present [Purpura] : no purpura  [Petechiae] : no petechiae [Skin Induration] : no induration [de-identified] : adult WM, NAD, alert, Ox 3. [de-identified] : Diabetic Charcot right foot deformity [de-identified] : right foot plantar ulcer 5th metatarsal healing , head of 5th metatarsal healing well , smaller and closing  [de-identified] : Diabetic neuropathy  [de-identified] : Circ neuromuscular function WNL post ace compression application, pt expressed comfort. [FreeTextEntry1] : Right Foot Lateral 5th Met (s/p 5th Met Resection 05/11/22)- dry eschar [FreeTextEntry2] : 0.6 [FreeTextEntry3] : 0.5 [FreeTextEntry4] : dry eschar [de-identified] : Callused - shaved by KYLE [de-identified] : silver alginate [de-identified] : Mechanically cleansed with Sterile gauze and 0.9% Normal Saline\par  [de-identified] : Circ neuromuscular function WNL post ace compression application, pt expressed comfort.\par  [FreeTextEntry7] : Right Plantar Foot  [FreeTextEntry8] : 2.1 [FreeTextEntry9] : 2.2 [de-identified] : 0.2 [de-identified] : Moderate serosanguineous [de-identified] : callus/mild maceration [de-identified] : 5-10% [de-identified] : silver alginate [de-identified] : Mechanically cleansed with sterile gauze and normal saline 0.9%\par Dry Dressing\par \par post debridement measurements: 2.2 x 2.5 x 0.2 [de-identified] : Circ neuromuscular function WNL post ace compression application, pt expressed comfort. [de-identified] : Right Lateral Foot [de-identified] : 0.7 [de-identified] : 0.3 [de-identified] : 0.2 [de-identified] : moderate Serosanguineous [de-identified] : callus/mild maceration [de-identified] : silver alginate [de-identified] : Mechanically cleansed with sterile gauze and normal saline 0.9%\par Dry Dressing\par  [TWNoteComboBox4] : None [TWNoteComboBox5] : No [de-identified] : No [de-identified] : None [de-identified] : None [de-identified] : None [de-identified] : No [de-identified] : Ace wraps [de-identified] : 3x Weekly [de-identified] : Primary Dressing [de-identified] : No [de-identified] : Diabetic [de-identified] : No [de-identified] : None [de-identified] : None [de-identified] : >75% [de-identified] : Yes [TWNoteComboBox8] : Lu[place]ia [de-identified] : Debridement performed of all devitalized tissue to bleeding viable tissue [de-identified] : Ace wraps [de-identified] : 3x Weekly [de-identified] : Primary Dressing [de-identified] : Diabetic [de-identified] : No [de-identified] : None [de-identified] : None [de-identified] : 100% [de-identified] : No [de-identified] : Ace wraps [de-identified] : 3x Weekly [de-identified] : Primary Dressing

## 2022-07-13 NOTE — ASSESSMENT
[FreeTextEntry2] : Infection prevention\par Promote Skin Integrity\par Offloading/pressure relief w/ use of offloading devices (CROW boot)\par Encourage Glycemic Control\par Weight reduction strategies\par elevation of lower legs\par low sodium diet\par compression compliance \par HBOT [FreeTextEntry4] : F/U 2 weeks for assessment and daily for HBOT\par Tissue Culture obtained and sent to lab\par Emergent same day sharps debridement performed today, auth submitted.\par 10/30 HBOT completed, no additional ordered at this time\par Pt to see cardiologist on 7/17/22, pt continues to have asymptotic hypertension b/p was 180/85 mmHg, DPM aware.\par Pt to follow up with endocrinologist in July\par

## 2022-07-14 ENCOUNTER — RX RENEWAL (OUTPATIENT)
Age: 57
End: 2022-07-14

## 2022-07-14 ENCOUNTER — OUTPATIENT (OUTPATIENT)
Dept: OUTPATIENT SERVICES | Facility: HOSPITAL | Age: 57
LOS: 1 days | Discharge: HOME | End: 2022-07-14
Payer: MEDICARE

## 2022-07-14 ENCOUNTER — APPOINTMENT (OUTPATIENT)
Dept: HYPERBARIC MEDICINE | Facility: HOSPITAL | Age: 57
End: 2022-07-14

## 2022-07-14 VITALS
BODY MASS INDEX: 41.75 KG/M2 | SYSTOLIC BLOOD PRESSURE: 148 MMHG | TEMPERATURE: 97.6 F | WEIGHT: 315 LBS | DIASTOLIC BLOOD PRESSURE: 82 MMHG | OXYGEN SATURATION: 95 % | HEIGHT: 73 IN | HEART RATE: 56 BPM | RESPIRATION RATE: 20 BRPM

## 2022-07-14 DIAGNOSIS — Z94.0 KIDNEY TRANSPLANT STATUS: Chronic | ICD-10-CM

## 2022-07-14 DIAGNOSIS — E11.621 TYPE 2 DIABETES MELLITUS WITH FOOT ULCER: ICD-10-CM

## 2022-07-14 DIAGNOSIS — N18.6 END STAGE RENAL DISEASE: Chronic | ICD-10-CM

## 2022-07-14 DIAGNOSIS — Z89.421 ACQUIRED ABSENCE OF OTHER RIGHT TOE(S): Chronic | ICD-10-CM

## 2022-07-14 PROCEDURE — G0463: CPT

## 2022-07-14 PROCEDURE — 99213 OFFICE O/P EST LOW 20 MIN: CPT | Mod: 24

## 2022-07-14 NOTE — HISTORY OF PRESENT ILLNESS
[FreeTextEntry1] : Pt seen for DFU plantar base 5th metatarsal right foot down to skin, subcutaneous tissue, fat. Pt also seen for right 5th metatarsal head lateral surgical wound closed.

## 2022-07-14 NOTE — PHYSICAL EXAM
[4 x 4] : 4 x 4  [Abdominal Pad] : Abdominal Pad [2 x 2] : 2 x 2  [0] : left 0 [1+] : left 1+ [Varicose Veins Of Lower Extremities] : not present [] : not present [Purpura] : no purpura  [Petechiae] : no petechiae [Skin Ulcer] : ulcer [Skin Induration] : no induration [Alert] : alert [Oriented to Person] : oriented to person [Oriented to Place] : oriented to place [Oriented to Time] : oriented to time [Calm] : calm [de-identified] : adult WM, NAD, alert, Ox 3. [de-identified] : HTN, HLD [de-identified] : Diabetic Charcot right foot deformity [de-identified] : right foot plantar ulcer 5th metatarsal healing , head of 5th metatarsal healing well , smaller and closing  [de-identified] : Diabetic neuropathy  [FreeTextEntry1] : Right Foot Lateral 5th Met (s/p 5th Met Resection 05/11/22)- dry eschar [FreeTextEntry2] : 0.5 [FreeTextEntry3] : 0.4 [FreeTextEntry4] : dry eschar [de-identified] : Callus- Removed [de-identified] : Dry Dressing  [de-identified] : Mechanically Cleansed with Sterile Gauze & Normal Saline  [de-identified] : \par  [FreeTextEntry7] : Right Plantar Foot  [FreeTextEntry8] : 2.0 [FreeTextEntry9] : 2.6 [de-identified] : 0.2 [de-identified] : Serosanguineous [de-identified] : Callus- Removed [de-identified] : Silver Alginate  [de-identified] : Mechanically Cleansed with Sterile Gauze & Normal Saline \par Dry Dressing\par \par  [de-identified] : Right Lateral Foot- Resolved [de-identified] : callus [de-identified] : No Treatment  [de-identified] : Mechanically Cleansed with Sterile Gauze & Normal Saline \par  [TWNoteComboBox4] : None [TWNoteComboBox5] : No [de-identified] : No [de-identified] : None [de-identified] : None [de-identified] : None [de-identified] : No [de-identified] : Ace wraps [de-identified] : 3x Weekly [de-identified] : False [de-identified] : Moderate [de-identified] : Diabetic [de-identified] : No [de-identified] : No [de-identified] : other [de-identified] : None [de-identified] : None [de-identified] : 100% [de-identified] : Yes [TWNoteComboBox8] : False [de-identified] : False [de-identified] : Ace wraps [de-identified] : 3x Weekly [de-identified] : False [de-identified] : None [de-identified] : Diabetic [de-identified] : No [de-identified] : None [de-identified] : None [de-identified] : 100% [de-identified] : No [de-identified] : Ace wraps [de-identified] : 3x Weekly

## 2022-07-14 NOTE — PLAN
[FreeTextEntry1] : Patient examined and evaluated at this time.\par Continue HBOT at this time.\par Will auth for apligraf. Continue local wound care and offloading.\par Spent 20 minutes for patient care and medical decision making.\par Patient to follow up in 1 week.\par

## 2022-07-14 NOTE — ASSESSMENT
[Verbal] : Verbal [Written] : Written [Demo] : Demo [Patient] : Patient [Good - alert, interested, motivated] : Good - alert, interested, motivated [Demonstrates independently] : demonstrates independently [Dressing changes] : dressing changes [Foot Care] : foot care [Skin Care] : skin care [Pressure relief] : pressure relief [Signs and symptoms of infection] : sign and symptoms of infection [How and When to Call] : how and when to call [Hyperbaric Therapy] : hyperbaric therapy [Off-loading] : off-loading [Compression Therapy] : compression therapy [Home Health] : home health [Patient responsibility to plan of care] : patient responsibility to plan of care [Glycemic Control] : glycemic control [Stable] : stable [Home] : Home [Ambulatory] : Ambulatory [Not Applicable - Long Term Care/Home Health Agency] : Long Term Care/Home Health Agency: Not Applicable [] : No [FreeTextEntry2] : Apligraf Application\par Localized Wound Care \par Infection Prevention \par Compression Therapy \par Offloading/Pressure Relief \par Encourage Glucose Contro\par HBOTl  [FreeTextEntry4] : Pt completed 18/30 HBOT.\par Pt to see cardiologist on 7/15/22. Pt had an appointment with Endocrinologist yesterday, Pt was told to watch his BS & f/u in 3 months..\par DPM feels Pt. would benefit from Apligraf Application. Submitted paperwork for Authorization of Apligraf#1.\par Pt to F/U to [facility] daily for HBOT & 1 Week for Assessment.

## 2022-07-14 NOTE — REVIEW OF SYSTEMS
[Fever] : no fever [Chills] : no chills [Eye Pain] : no eye pain [Loss Of Hearing] : no hearing loss [Shortness Of Breath] : no shortness of breath [Abdominal Pain] : no abdominal pain [Vomiting] : no vomiting [Skin Lesions] : no skin lesions [Skin Wound] : no skin wound [Anxiety] : no anxiety [Easy Bleeding] : no tendency for easy bleeding [FreeTextEntry5] : HTN,HLD [FreeTextEntry7] : 40.9 BMI , morbid obesity  [FreeTextEntry8] : Kidney Transplant  [FreeTextEntry9] : Associated Diabetic Charcot foot  [de-identified] : plantar right foot midfoot , lateral  DFU 2  , large area of peripheral callus build up , the ulcer is clean and granular s/p surgical intervention , sutures intact - soi  [de-identified] : IDDM with neuropathy  [de-identified] : MARILUZ

## 2022-07-15 ENCOUNTER — APPOINTMENT (OUTPATIENT)
Dept: CARDIOLOGY | Facility: CLINIC | Age: 57
End: 2022-07-15

## 2022-07-15 ENCOUNTER — NON-APPOINTMENT (OUTPATIENT)
Age: 57
End: 2022-07-15

## 2022-07-15 ENCOUNTER — OUTPATIENT (OUTPATIENT)
Dept: OUTPATIENT SERVICES | Facility: HOSPITAL | Age: 57
LOS: 1 days | Discharge: HOME | End: 2022-07-15
Payer: MEDICARE

## 2022-07-15 ENCOUNTER — APPOINTMENT (OUTPATIENT)
Dept: HYPERBARIC MEDICINE | Facility: HOSPITAL | Age: 57
End: 2022-07-15

## 2022-07-15 VITALS
DIASTOLIC BLOOD PRESSURE: 86 MMHG | HEART RATE: 71 BPM | BODY MASS INDEX: 41.75 KG/M2 | HEIGHT: 73 IN | OXYGEN SATURATION: 92 % | SYSTOLIC BLOOD PRESSURE: 186 MMHG | WEIGHT: 315 LBS

## 2022-07-15 VITALS
TEMPERATURE: 98.6 F | HEART RATE: 61 BPM | DIASTOLIC BLOOD PRESSURE: 83 MMHG | SYSTOLIC BLOOD PRESSURE: 175 MMHG | RESPIRATION RATE: 20 BRPM | OXYGEN SATURATION: 95 %

## 2022-07-15 VITALS
HEART RATE: 55 BPM | OXYGEN SATURATION: 95 % | DIASTOLIC BLOOD PRESSURE: 87 MMHG | SYSTOLIC BLOOD PRESSURE: 161 MMHG | RESPIRATION RATE: 20 BRPM | TEMPERATURE: 97 F

## 2022-07-15 DIAGNOSIS — E11.51 TYPE 2 DIABETES MELLITUS WITH DIABETIC PERIPHERAL ANGIOPATHY WITHOUT GANGRENE: ICD-10-CM

## 2022-07-15 DIAGNOSIS — Z86.16 PERSONAL HISTORY OF COVID-19: ICD-10-CM

## 2022-07-15 DIAGNOSIS — N18.6 END STAGE RENAL DISEASE: Chronic | ICD-10-CM

## 2022-07-15 DIAGNOSIS — E66.01 MORBID (SEVERE) OBESITY DUE TO EXCESS CALORIES: ICD-10-CM

## 2022-07-15 DIAGNOSIS — Z87.81 PERSONAL HISTORY OF (HEALED) TRAUMATIC FRACTURE: ICD-10-CM

## 2022-07-15 DIAGNOSIS — Z89.421 ACQUIRED ABSENCE OF OTHER RIGHT TOE(S): ICD-10-CM

## 2022-07-15 DIAGNOSIS — Z79.899 OTHER LONG TERM (CURRENT) DRUG THERAPY: ICD-10-CM

## 2022-07-15 DIAGNOSIS — Z79.4 LONG TERM (CURRENT) USE OF INSULIN: ICD-10-CM

## 2022-07-15 DIAGNOSIS — E78.5 HYPERLIPIDEMIA, UNSPECIFIED: ICD-10-CM

## 2022-07-15 DIAGNOSIS — E11.610 TYPE 2 DIABETES MELLITUS WITH DIABETIC NEUROPATHIC ARTHROPATHY: ICD-10-CM

## 2022-07-15 DIAGNOSIS — L97.412 NON-PRESSURE CHRONIC ULCER OF RIGHT HEEL AND MIDFOOT WITH FAT LAYER EXPOSED: ICD-10-CM

## 2022-07-15 DIAGNOSIS — Z80.8 FAMILY HISTORY OF MALIGNANT NEOPLASM OF OTHER ORGANS OR SYSTEMS: ICD-10-CM

## 2022-07-15 DIAGNOSIS — Z94.0 KIDNEY TRANSPLANT STATUS: ICD-10-CM

## 2022-07-15 DIAGNOSIS — Z86.73 PERSONAL HISTORY OF TRANSIENT ISCHEMIC ATTACK (TIA), AND CEREBRAL INFARCTION WITHOUT RESIDUAL DEFICITS: ICD-10-CM

## 2022-07-15 DIAGNOSIS — Z85.828 PERSONAL HISTORY OF OTHER MALIGNANT NEOPLASM OF SKIN: ICD-10-CM

## 2022-07-15 DIAGNOSIS — Z86.718 PERSONAL HISTORY OF OTHER VENOUS THROMBOSIS AND EMBOLISM: ICD-10-CM

## 2022-07-15 DIAGNOSIS — L84 CORNS AND CALLOSITIES: ICD-10-CM

## 2022-07-15 DIAGNOSIS — E11.621 TYPE 2 DIABETES MELLITUS WITH FOOT ULCER: ICD-10-CM

## 2022-07-15 DIAGNOSIS — Z89.421 ACQUIRED ABSENCE OF OTHER RIGHT TOE(S): Chronic | ICD-10-CM

## 2022-07-15 DIAGNOSIS — Z80.3 FAMILY HISTORY OF MALIGNANT NEOPLASM OF BREAST: ICD-10-CM

## 2022-07-15 DIAGNOSIS — Z83.3 FAMILY HISTORY OF DIABETES MELLITUS: ICD-10-CM

## 2022-07-15 DIAGNOSIS — Z89.422 ACQUIRED ABSENCE OF OTHER LEFT TOE(S): ICD-10-CM

## 2022-07-15 DIAGNOSIS — Z94.0 KIDNEY TRANSPLANT STATUS: Chronic | ICD-10-CM

## 2022-07-15 DIAGNOSIS — Z79.82 LONG TERM (CURRENT) USE OF ASPIRIN: ICD-10-CM

## 2022-07-15 DIAGNOSIS — G47.33 OBSTRUCTIVE SLEEP APNEA (ADULT) (PEDIATRIC): ICD-10-CM

## 2022-07-15 LAB — SARS-COV-2 RNA SPEC QL NAA+PROBE: SIGNIFICANT CHANGE UP

## 2022-07-15 PROCEDURE — 99215 OFFICE O/P EST HI 40 MIN: CPT

## 2022-07-15 PROCEDURE — G0277: CPT

## 2022-07-15 PROCEDURE — U0003: CPT

## 2022-07-15 PROCEDURE — 82962 GLUCOSE BLOOD TEST: CPT

## 2022-07-15 PROCEDURE — U0005: CPT

## 2022-07-15 PROCEDURE — 99183 HYPERBARIC OXYGEN THERAPY: CPT

## 2022-07-15 PROCEDURE — 93000 ELECTROCARDIOGRAM COMPLETE: CPT

## 2022-07-15 RX ORDER — FEXOFENADINE HYDROCHLORIDE AND PSEUDOEPHEDRINE HYDROCHLORIDE 60; 120 MG/1; MG/1
60-120 TABLET, FILM COATED, EXTENDED RELEASE ORAL
Qty: 180 | Refills: 0 | Status: DISCONTINUED | COMMUNITY
Start: 2022-03-08

## 2022-07-15 RX ORDER — INSULIN LISPRO 100 [IU]/ML
100 INJECTION, SOLUTION INTRAVENOUS; SUBCUTANEOUS
Qty: 90 | Refills: 0 | Status: DISCONTINUED | COMMUNITY
Start: 2022-04-29

## 2022-07-15 RX ORDER — POTASSIUM CHLORIDE 750 MG/1
10 TABLET, FILM COATED, EXTENDED RELEASE ORAL
Qty: 7 | Refills: 0 | Status: DISCONTINUED | COMMUNITY
Start: 2022-05-27

## 2022-07-15 RX ORDER — FUROSEMIDE 20 MG/1
20 TABLET ORAL
Qty: 7 | Refills: 0 | Status: DISCONTINUED | COMMUNITY
Start: 2022-05-27

## 2022-07-15 NOTE — PROCEDURE
[Outpatient] : Outpatient [Ambulatory] : Patient is ambulatory. [THIS CHAMBER HAS BEEN CLEANED / DISINFECTED] : This chamber has been cleaned / disinfected according to local and hospital policy and procedure prior to this treatment. [Patient demonstrated and verbalized proper technique for using air break mask] : Patient demonstrated and verbalized proper technique for using air break mask [Patient educated on the risks of SMOKING prior to HBOT with understanding] : Patient educated on the risks of SMOKING prior to HBOT with understanding [Patient educated on the risks of CONSUMING ALCOHOL prior to HBOT with understanding] : Patient educated on the risks of CONSUMING ALCOHOL prior to HBOT with understanding [100% Cotton] : 100% cotton [Empty all pockets] : empty all pockets [No hair oils, wigs, hairpieces, pins] : no hair oils, wigs, hairpieces, pins  [Pre tx medications] : pre tx medications  [No make-up, creams] : no make-up, creams  [No jewelry] : no jewelry  [No matches, cigarettes, lighters] : no matches, cigarettes, lighters  [Hearing aid removed] : hearing aid removed [Dentures removed] : dentures removed [Ground bracelet on pt's wrist] : ground bracelet on pt's wrist  [Contacts removed] : contacts removed  [Remove nail polish] : remove nail polish  [No reading material] : no reading material  [Bra, undergarments removed] : bra, undergarments removed  [No contraindicated dressings] : no contraindicated dressings [Ground Wire - VISUAL Verification - Intact/Free of Obstruction] : Ground Wire - VISUAL Verification - Intact/Free of Obstruction  [Ground Continuity - Verified < 1 ohm w/ Wrist Strap Gatito] : Ground Continuity - Verified < 1 ohm w/ Wrist Strap Gatito [Number: ___] : Number: [unfilled] [Diagnosis: ___] : Diagnosis: [unfilled] [Clear all fields] : clear all fields [____] : Post-Dive: Time - [unfilled] [___] : Post-Dive: Value - [unfilled] mg/dL [] : No [FreeTextEntry4] : 100 [FreeTextEntry6] : 3111 [FreeTextEntry8] : 6974 [de-identified] : 0176 [de-identified] : 8262 [de-identified] : 0983 [de-identified] : 6408 [de-identified] : 1010 [de-identified] : 1025 [de-identified] : 120mins

## 2022-07-15 NOTE — ADDENDUM
[FreeTextEntry1] : Patients Order Verified Prior to Treatment. \par Patients Secondary Contraindication Examination Check Preformed by CHT. \par Patients Contraindication List and Pre-Check List, Verified and Signed by Technician and CHT Prior to Patients Treatment. \par \par \par Patient descended to 2.4 DEEPAK @ 2.2 PSI/MIN without incident in chamber #1.\par Patient resting comfortably @ depth, equal chest rise observed throughout treatment.\par Patient tolerated both air breaks well.\par Patient ascended from 2.4 DEEPAK @ 2.2 PSI/MIN without incident in chamber #1.\par Patient tolerated treatment well.\par Patient Exited the Hyperbaric Suite Safely\par \par Patients Dressing Changed Post Hyperbaric Treatment by Nurse. \par \par \par \par

## 2022-07-16 ENCOUNTER — APPOINTMENT (OUTPATIENT)
Dept: HYPERBARIC MEDICINE | Facility: HOSPITAL | Age: 57
End: 2022-07-16

## 2022-07-16 ENCOUNTER — OUTPATIENT (OUTPATIENT)
Dept: OUTPATIENT SERVICES | Facility: HOSPITAL | Age: 57
LOS: 1 days | Discharge: HOME | End: 2022-07-16
Payer: MEDICARE

## 2022-07-16 VITALS
SYSTOLIC BLOOD PRESSURE: 128 MMHG | DIASTOLIC BLOOD PRESSURE: 78 MMHG | HEART RATE: 74 BPM | TEMPERATURE: 98.6 F | RESPIRATION RATE: 16 BRPM | OXYGEN SATURATION: 99 %

## 2022-07-16 VITALS
SYSTOLIC BLOOD PRESSURE: 160 MMHG | RESPIRATION RATE: 16 BRPM | DIASTOLIC BLOOD PRESSURE: 80 MMHG | OXYGEN SATURATION: 98 % | TEMPERATURE: 98.6 F | HEART RATE: 74 BPM

## 2022-07-16 DIAGNOSIS — Z94.0 KIDNEY TRANSPLANT STATUS: Chronic | ICD-10-CM

## 2022-07-16 DIAGNOSIS — Z89.421 ACQUIRED ABSENCE OF OTHER RIGHT TOE(S): Chronic | ICD-10-CM

## 2022-07-16 DIAGNOSIS — Z79.4 LONG TERM (CURRENT) USE OF INSULIN: ICD-10-CM

## 2022-07-16 DIAGNOSIS — L97.412 NON-PRESSURE CHRONIC ULCER OF RIGHT HEEL AND MIDFOOT WITH FAT LAYER EXPOSED: ICD-10-CM

## 2022-07-16 DIAGNOSIS — E11.621 TYPE 2 DIABETES MELLITUS WITH FOOT ULCER: ICD-10-CM

## 2022-07-16 DIAGNOSIS — N18.6 END STAGE RENAL DISEASE: Chronic | ICD-10-CM

## 2022-07-16 PROCEDURE — G0277: CPT

## 2022-07-16 PROCEDURE — 99183 HYPERBARIC OXYGEN THERAPY: CPT

## 2022-07-16 PROCEDURE — 82962 GLUCOSE BLOOD TEST: CPT

## 2022-07-16 NOTE — PROCEDURE
[Outpatient] : Outpatient [Ambulatory] : Patient is ambulatory. [THIS CHAMBER HAS BEEN CLEANED / DISINFECTED] : This chamber has been cleaned / disinfected according to local and hospital policy and procedure prior to this treatment. [____] : Post-Dive: Time - [unfilled] [___] : Post-Dive: Value - [unfilled] mg/dL [Patient demonstrated and verbalized proper technique for using air break mask] : Patient demonstrated and verbalized proper technique for using air break mask [Patient educated on the risks of SMOKING prior to HBOT with understanding] : Patient educated on the risks of SMOKING prior to HBOT with understanding [Patient educated on the risks of CONSUMING ALCOHOL prior to HBOT with understanding] : Patient educated on the risks of CONSUMING ALCOHOL prior to HBOT with understanding [100% Cotton] : 100% cotton [Empty all pockets] : empty all pockets [No hair oils, wigs, hairpieces, pins] : no hair oils, wigs, hairpieces, pins  [Pre tx medications] : pre tx medications  [No make-up, creams] : no make-up, creams  [No jewelry] : no jewelry  [No matches, cigarettes, lighters] : no matches, cigarettes, lighters  [Hearing aid removed] : hearing aid removed [Dentures removed] : dentures removed [Ground bracelet on pt's wrist] : ground bracelet on pt's wrist  [Contacts removed] : contacts removed  [Remove nail polish] : remove nail polish  [No reading material] : no reading material  [Bra, undergarments removed] : bra, undergarments removed  [No contraindicated dressings] : no contraindicated dressings [Ground Wire - VISUAL Verification - Intact/Free of Obstruction] : Ground Wire - VISUAL Verification - Intact/Free of Obstruction  [Ground Continuity - Verified < 1 ohm w/ Wrist Strap Gatito] : Ground Continuity - Verified < 1 ohm w/ Wrist Strap Gatito [Diagnosis: ___] : Diagnosis: [unfilled] [Number: ___] : Number: [unfilled] [Clear all fields] : clear all fields [] : No [FreeTextEntry4] : 100 [FreeTextEntry6] : 4585 [FreeTextEntry8] : 3130 [de-identified] : 4934 [de-identified] : 0212 [de-identified] : 0918 [de-identified] : 4952 [de-identified] : 1003 [de-identified] : 1012 [de-identified] : 120mins

## 2022-07-16 NOTE — ASSESSMENT
[No change from previous assessment] : No change from previous assessment [Time MD/Provider assessed Patient:_______] : Time MD/Provider assessed Patient: [unfilled] [Patient prepared for dive] : Patient prepared for dive [Patient undergoing HBO treatment for __________] : Patient undergoing HBO treatment for [unfilled] [Patient descended without problem for 9 minutes] : Patient descended without problem for 9 minutes [No dizziness or thirst] :  No dizziness or thirst [No ear problems] : No ear problems [Vital signs stable] : Vital signs stable [Tolerating dive well] : Tolerating dive well [No Chest Pain, shortness of breath] : No Chest Pain, shortness of breath [Respiratory Rate Stable] : Respiratory Rate Stable [No chest pain, shortness of breath, or ear pain] :  No chest pain, shortness of breath, or ear pain  [Tolerated Ascent well] : Tolerated Ascent well [Vital Signs stable] : Vital Signs stable [A physician was present throughout the entire HBOT] : A physician was present throughout the entire HBOT [No] : No [Clinically Stable] : Clinically stable [Continue Treatment Plan] : Continue treatment plan [FreeTextEntry2] : none

## 2022-07-16 NOTE — ADDENDUM
[FreeTextEntry1] : Pt descended to 2.4 DEEPAK @ 2.2 PSI/min without incident in chamber #1\par Pt resting @ depth with chest rise and fall observed throughout tx. \par Pt tolerated air breaks well. \par Pt ascended from 2.4 DEEPAK @ 2.2 PSI/min without incident. \par Pt tolerated tx well.\par Pt received Ace wrap by RN post tx. \par \par \par

## 2022-07-18 ENCOUNTER — APPOINTMENT (OUTPATIENT)
Dept: HYPERBARIC MEDICINE | Facility: HOSPITAL | Age: 57
End: 2022-07-18

## 2022-07-18 ENCOUNTER — OUTPATIENT (OUTPATIENT)
Dept: OUTPATIENT SERVICES | Facility: HOSPITAL | Age: 57
LOS: 1 days | Discharge: HOME | End: 2022-07-18
Payer: MEDICARE

## 2022-07-18 VITALS
HEART RATE: 58 BPM | RESPIRATION RATE: 20 BRPM | TEMPERATURE: 98 F | DIASTOLIC BLOOD PRESSURE: 79 MMHG | OXYGEN SATURATION: 95 % | SYSTOLIC BLOOD PRESSURE: 171 MMHG

## 2022-07-18 VITALS
SYSTOLIC BLOOD PRESSURE: 174 MMHG | DIASTOLIC BLOOD PRESSURE: 86 MMHG | HEART RATE: 56 BPM | OXYGEN SATURATION: 96 % | RESPIRATION RATE: 20 BRPM | TEMPERATURE: 97.4 F

## 2022-07-18 DIAGNOSIS — Z94.0 KIDNEY TRANSPLANT STATUS: Chronic | ICD-10-CM

## 2022-07-18 DIAGNOSIS — E11.621 TYPE 2 DIABETES MELLITUS WITH FOOT ULCER: ICD-10-CM

## 2022-07-18 DIAGNOSIS — N18.6 END STAGE RENAL DISEASE: Chronic | ICD-10-CM

## 2022-07-18 DIAGNOSIS — Z89.421 ACQUIRED ABSENCE OF OTHER RIGHT TOE(S): Chronic | ICD-10-CM

## 2022-07-18 DIAGNOSIS — L97.412 NON-PRESSURE CHRONIC ULCER OF RIGHT HEEL AND MIDFOOT WITH FAT LAYER EXPOSED: ICD-10-CM

## 2022-07-18 DIAGNOSIS — Z79.4 LONG TERM (CURRENT) USE OF INSULIN: ICD-10-CM

## 2022-07-18 PROCEDURE — 99183 HYPERBARIC OXYGEN THERAPY: CPT

## 2022-07-18 PROCEDURE — G0277: CPT

## 2022-07-18 PROCEDURE — 82962 GLUCOSE BLOOD TEST: CPT

## 2022-07-18 NOTE — PROCEDURE
[Outpatient] : Outpatient [Ambulatory] : Patient is ambulatory. [THIS CHAMBER HAS BEEN CLEANED / DISINFECTED] : This chamber has been cleaned / disinfected according to local and hospital policy and procedure prior to this treatment. [Patient demonstrated and verbalized proper technique for using air break mask] : Patient demonstrated and verbalized proper technique for using air break mask [Patient educated on the risks of SMOKING prior to HBOT with understanding] : Patient educated on the risks of SMOKING prior to HBOT with understanding [Patient educated on the risks of CONSUMING ALCOHOL prior to HBOT with understanding] : Patient educated on the risks of CONSUMING ALCOHOL prior to HBOT with understanding [100% Cotton] : 100% cotton [Empty all pockets] : empty all pockets [No hair oils, wigs, hairpieces, pins] : no hair oils, wigs, hairpieces, pins  [Pre tx medications] : pre tx medications  [No make-up, creams] : no make-up, creams  [No jewelry] : no jewelry  [No matches, cigarettes, lighters] : no matches, cigarettes, lighters  [Hearing aid removed] : hearing aid removed [Dentures removed] : dentures removed [Ground bracelet on pt's wrist] : ground bracelet on pt's wrist  [Contacts removed] : contacts removed  [Remove nail polish] : remove nail polish  [No reading material] : no reading material  [Bra, undergarments removed] : bra, undergarments removed  [No contraindicated dressings] : no contraindicated dressings [Ground Wire - VISUAL Verification - Intact/Free of Obstruction] : Ground Wire - VISUAL Verification - Intact/Free of Obstruction  [Ground Continuity - Verified < 1 ohm w/ Wrist Strap Gatito] : Ground Continuity - Verified < 1 ohm w/ Wrist Strap Gatito [Number: ___] : Number: [unfilled] [Diagnosis: ___] : Diagnosis: [unfilled] [____] : Post-Dive: Time - [unfilled] [___] : Post-Dive: Value - [unfilled] mg/dL [Clear all fields] : clear all fields [] : No [FreeTextEntry4] : 100 [FreeTextEntry6] : 8784 [FreeTextEntry8] : 0863 [de-identified] : 2918 [de-identified] : 5797 [de-identified] : 0947 [de-identified] : 6743 [de-identified] : 1013 [de-identified] : 1025 [de-identified] : 120mins

## 2022-07-18 NOTE — ADDENDUM
[FreeTextEntry1] : Patients Order Verified Prior to Treatment. \par Patients Secondary Contraindication Examination Check Preformed by CHT. \par Patients Contraindication List and Pre-Check List, Verified and Signed by Technician and CHT Prior to Patients Treatment. \par \par Patient descended to 2.4 DEEPAK @ 2.2 PSI/MIN without incident in chamber #1.\par Patient resting comfortably @ depth, equal chest rise observed throughout treatment.\par Patient tolerated both air breaks well.\par Patient ascended from 2.4 DEEPAK @ 2.2 PSI/MIN without incident in chamber #1.\par Patient tolerated treatment well.\par Patient Exited the Hyperbaric Suite Safely\par \par Patients Dressing Changed Post Hyperbaric Treatment by Nurse. \par \par

## 2022-07-19 ENCOUNTER — APPOINTMENT (OUTPATIENT)
Dept: HYPERBARIC MEDICINE | Facility: HOSPITAL | Age: 57
End: 2022-07-19

## 2022-07-19 ENCOUNTER — OUTPATIENT (OUTPATIENT)
Dept: OUTPATIENT SERVICES | Facility: HOSPITAL | Age: 57
LOS: 1 days | Discharge: HOME | End: 2022-07-19
Payer: MEDICARE

## 2022-07-19 VITALS
SYSTOLIC BLOOD PRESSURE: 146 MMHG | TEMPERATURE: 98.1 F | HEART RATE: 74 BPM | RESPIRATION RATE: 20 BRPM | DIASTOLIC BLOOD PRESSURE: 74 MMHG | OXYGEN SATURATION: 100 % | HEIGHT: 73 IN

## 2022-07-19 VITALS
OXYGEN SATURATION: 95 % | TEMPERATURE: 98.6 F | HEART RATE: 62 BPM | DIASTOLIC BLOOD PRESSURE: 87 MMHG | SYSTOLIC BLOOD PRESSURE: 166 MMHG | RESPIRATION RATE: 20 BRPM

## 2022-07-19 DIAGNOSIS — Z94.0 KIDNEY TRANSPLANT STATUS: Chronic | ICD-10-CM

## 2022-07-19 DIAGNOSIS — L97.412 NON-PRESSURE CHRONIC ULCER OF RIGHT HEEL AND MIDFOOT WITH FAT LAYER EXPOSED: ICD-10-CM

## 2022-07-19 DIAGNOSIS — E11.621 TYPE 2 DIABETES MELLITUS WITH FOOT ULCER: ICD-10-CM

## 2022-07-19 DIAGNOSIS — Z89.421 ACQUIRED ABSENCE OF OTHER RIGHT TOE(S): Chronic | ICD-10-CM

## 2022-07-19 DIAGNOSIS — N18.6 END STAGE RENAL DISEASE: Chronic | ICD-10-CM

## 2022-07-19 DIAGNOSIS — Z79.4 LONG TERM (CURRENT) USE OF INSULIN: ICD-10-CM

## 2022-07-19 PROCEDURE — G0277: CPT

## 2022-07-19 PROCEDURE — 82962 GLUCOSE BLOOD TEST: CPT

## 2022-07-19 PROCEDURE — 99183 HYPERBARIC OXYGEN THERAPY: CPT

## 2022-07-20 ENCOUNTER — APPOINTMENT (OUTPATIENT)
Dept: CARDIOLOGY | Facility: CLINIC | Age: 57
End: 2022-07-20

## 2022-07-20 ENCOUNTER — APPOINTMENT (OUTPATIENT)
Dept: HYPERBARIC MEDICINE | Facility: HOSPITAL | Age: 57
End: 2022-07-20

## 2022-07-20 ENCOUNTER — MED ADMIN CHARGE (OUTPATIENT)
Age: 57
End: 2022-07-20

## 2022-07-20 ENCOUNTER — OUTPATIENT (OUTPATIENT)
Dept: OUTPATIENT SERVICES | Facility: HOSPITAL | Age: 57
LOS: 1 days | Discharge: HOME | End: 2022-07-20
Payer: MEDICARE

## 2022-07-20 VITALS
RESPIRATION RATE: 20 BRPM | OXYGEN SATURATION: 97 % | TEMPERATURE: 98.4 F | HEART RATE: 59 BPM | SYSTOLIC BLOOD PRESSURE: 153 MMHG | DIASTOLIC BLOOD PRESSURE: 83 MMHG

## 2022-07-20 VITALS
HEART RATE: 58 BPM | OXYGEN SATURATION: 96 % | DIASTOLIC BLOOD PRESSURE: 79 MMHG | TEMPERATURE: 98.3 F | SYSTOLIC BLOOD PRESSURE: 161 MMHG | RESPIRATION RATE: 20 BRPM

## 2022-07-20 DIAGNOSIS — Z89.421 ACQUIRED ABSENCE OF OTHER RIGHT TOE(S): Chronic | ICD-10-CM

## 2022-07-20 DIAGNOSIS — Z79.4 LONG TERM (CURRENT) USE OF INSULIN: ICD-10-CM

## 2022-07-20 DIAGNOSIS — N18.6 END STAGE RENAL DISEASE: Chronic | ICD-10-CM

## 2022-07-20 DIAGNOSIS — L97.412 NON-PRESSURE CHRONIC ULCER OF RIGHT HEEL AND MIDFOOT WITH FAT LAYER EXPOSED: ICD-10-CM

## 2022-07-20 DIAGNOSIS — Z94.0 KIDNEY TRANSPLANT STATUS: Chronic | ICD-10-CM

## 2022-07-20 DIAGNOSIS — E11.621 TYPE 2 DIABETES MELLITUS WITH FOOT ULCER: ICD-10-CM

## 2022-07-20 PROCEDURE — G0277: CPT

## 2022-07-20 PROCEDURE — 93306 TTE W/DOPPLER COMPLETE: CPT

## 2022-07-20 PROCEDURE — 99183 HYPERBARIC OXYGEN THERAPY: CPT

## 2022-07-20 PROCEDURE — 82962 GLUCOSE BLOOD TEST: CPT

## 2022-07-20 RX ADMIN — PERFLUTREN MG/ML: 6.52 INJECTION, SUSPENSION INTRAVENOUS at 00:00

## 2022-07-20 NOTE — ADDENDUM
[FreeTextEntry1] : Patients Order Verified Prior to Treatment. \par Patients Secondary Contraindication Examination Check Preformed by CHT. \par Patients Contraindication List and Pre-Check List, Verified and Signed by Technician and CHT Prior to Patients Treatment. \par \par TRANSFER OF CARE TO T\par PT DESCENT TO RX TX DEPTH IN CHAMBER 1 WAS WITHOUT INCIDENT.\par PT RESTING AT DEPTH, CHEST RISE AND FALL OBSERVED.\par PT ASCENT WAS WITHOUT INCIDENT.\par PT TOLERATED HBOT WELL.\par WOUND CARE COMPLETED PRE TX\par \par CHT HERMELINDO GIRALDO 07/20/22

## 2022-07-20 NOTE — PROCEDURE
[Outpatient] : Outpatient [Ambulatory] : Patient is ambulatory. [THIS CHAMBER HAS BEEN CLEANED / DISINFECTED] : This chamber has been cleaned / disinfected according to local and hospital policy and procedure prior to this treatment. [Patient demonstrated and verbalized proper technique for using air break mask] : Patient demonstrated and verbalized proper technique for using air break mask [Patient educated on the risks of SMOKING prior to HBOT with understanding] : Patient educated on the risks of SMOKING prior to HBOT with understanding [Patient educated on the risks of CONSUMING ALCOHOL prior to HBOT with understanding] : Patient educated on the risks of CONSUMING ALCOHOL prior to HBOT with understanding [100% Cotton] : 100% cotton [Empty all pockets] : empty all pockets [No hair oils, wigs, hairpieces, pins] : no hair oils, wigs, hairpieces, pins  [Pre tx medications] : pre tx medications  [No make-up, creams] : no make-up, creams  [No jewelry] : no jewelry  [No matches, cigarettes, lighters] : no matches, cigarettes, lighters  [Hearing aid removed] : hearing aid removed [Dentures removed] : dentures removed [Ground bracelet on pt's wrist] : ground bracelet on pt's wrist  [Contacts removed] : contacts removed  [Remove nail polish] : remove nail polish  [No reading material] : no reading material  [Bra, undergarments removed] : bra, undergarments removed  [No contraindicated dressings] : no contraindicated dressings [Ground Wire - VISUAL Verification - Intact/Free of Obstruction] : Ground Wire - VISUAL Verification - Intact/Free of Obstruction  [Ground Continuity - Verified < 1 ohm w/ Wrist Strap Gatito] : Ground Continuity - Verified < 1 ohm w/ Wrist Strap Gatito [Number: ___] : Number: [unfilled] [Diagnosis: ___] : Diagnosis: [unfilled] [____] : Post-Dive: Time - [unfilled] [___] : Post-Dive: Value - [unfilled] mg/dL [Clear all fields] : clear all fields [] : No [FreeTextEntry4] : 100 MIN [FreeTextEntry6] : 8:29 [FreeTextEntry8] : 8:39 [de-identified] : 9:09 [de-identified] : 9:14 [de-identified] : 9:44 [de-identified] : 9:49 [de-identified] : 10:19 [de-identified] : 10:29 [de-identified] : 120 MIN

## 2022-07-21 ENCOUNTER — OUTPATIENT (OUTPATIENT)
Dept: OUTPATIENT SERVICES | Facility: HOSPITAL | Age: 57
LOS: 1 days | Discharge: HOME | End: 2022-07-21
Payer: MEDICARE

## 2022-07-21 ENCOUNTER — APPOINTMENT (OUTPATIENT)
Dept: HYPERBARIC MEDICINE | Facility: HOSPITAL | Age: 57
End: 2022-07-21

## 2022-07-21 VITALS
WEIGHT: 315 LBS | OXYGEN SATURATION: 93 % | TEMPERATURE: 97.6 F | SYSTOLIC BLOOD PRESSURE: 147 MMHG | BODY MASS INDEX: 41.75 KG/M2 | RESPIRATION RATE: 20 BRPM | DIASTOLIC BLOOD PRESSURE: 71 MMHG | HEART RATE: 58 BPM | HEIGHT: 73 IN

## 2022-07-21 DIAGNOSIS — Z89.421 ACQUIRED ABSENCE OF OTHER RIGHT TOE(S): Chronic | ICD-10-CM

## 2022-07-21 DIAGNOSIS — E11.621 TYPE 2 DIABETES MELLITUS WITH FOOT ULCER: ICD-10-CM

## 2022-07-21 DIAGNOSIS — Z94.0 KIDNEY TRANSPLANT STATUS: Chronic | ICD-10-CM

## 2022-07-21 DIAGNOSIS — N18.6 END STAGE RENAL DISEASE: Chronic | ICD-10-CM

## 2022-07-21 PROCEDURE — 15271 SKIN SUB GRAFT TRNK/ARM/LEG: CPT

## 2022-07-21 PROCEDURE — 15275 SKIN SUB GRAFT FACE/NK/HF/G: CPT | Mod: 58

## 2022-07-21 RX ORDER — PERFLUTREN 6.52 MG/ML
6.52 INJECTION, SUSPENSION INTRAVENOUS
Qty: 2 | Refills: 0 | Status: COMPLETED | OUTPATIENT
Start: 2022-07-20

## 2022-07-21 NOTE — PHYSICAL EXAM
[4 x 4] : 4 x 4  [Abdominal Pad] : Abdominal Pad [0] : left 0 [1+] : left 1+ [Varicose Veins Of Lower Extremities] : not present [] : not present [Purpura] : no purpura  [Petechiae] : no petechiae [Skin Ulcer] : ulcer [Skin Induration] : no induration [Alert] : alert [Oriented to Person] : oriented to person [Oriented to Place] : oriented to place [Oriented to Time] : oriented to time [Calm] : calm [de-identified] : adult WM, NAD, alert, Ox 3. [de-identified] : HTN, HLD [de-identified] : Diabetic Charcot right foot deformity [de-identified] : right foot plantar ulcer 5th metatarsal healing , head of 5th metatarsal healed well [de-identified] : Diabetic neuropathy  [FreeTextEntry1] : Right Foot Lateral 5th Met- Resolved [de-identified] : Callus- Removed [de-identified] : No Treatment  [de-identified] : Mechanically Cleansed with Sterile Gauze & Normal Saline  [de-identified] : \par  [FreeTextEntry8] : 1.7 [FreeTextEntry7] : Right Plantar Foot  [FreeTextEntry9] : 2.4 [de-identified] : 0.2 [de-identified] : Serosanguineous [de-identified] : Callus- Removed [FreeTextEntry6] : Post Debridement Size- 1.8 x 2.5 x 0.2 [de-identified] : Apligraf [de-identified] : Apligraf, Adaptic Touch & Calcium Alginate  [de-identified] : Mechanically Cleansed with Sterile Gauze & Normal Saline \par Dry Dressing\par \par 1 Unit of Apligraf 44(SqCm)\par Lot#- [id number]\par PO#- [id number]\par Expiration- 7/21/2022\par pH- 7.3-7.5\par \par Reconstituted with Normal Saline \par Lot#- [id number]\par NS Expiration Date- 09/01/2024\par \par Percentage Implanted- 25%\par Percentage Discharged- 75%  [de-identified] : Right Lateral Foot- Resolved [de-identified] : No Treatment  [de-identified] : Mechanically Cleansed with Sterile Gauze & Normal Saline \par  [TWNoteComboBox4] : None [TWNoteComboBox5] : No [de-identified] : No [de-identified] : None [de-identified] : None [de-identified] : None [de-identified] : No [de-identified] : Ace wraps [de-identified] : 3x Weekly [de-identified] : Moderate [de-identified] : Diabetic [de-identified] : other [de-identified] : None [de-identified] : None [de-identified] : 100% [TWNoteComboBox8] : Luzmaria [de-identified] : Application of skin substitute [de-identified] : Ace wraps [de-identified] : Weekly [de-identified] : None [de-identified] : Ace wraps [de-identified] : Diabetic

## 2022-07-21 NOTE — REVIEW OF SYSTEMS
[FreeTextEntry1] : 736 [Fever] : no fever [Chills] : no chills [Eye Pain] : no eye pain [Loss Of Hearing] : no hearing loss [Abdominal Pain] : no abdominal pain [Shortness Of Breath] : no shortness of breath [Vomiting] : no vomiting [Skin Lesions] : no skin lesions [Skin Wound] : no skin wound [Anxiety] : no anxiety [Easy Bleeding] : no tendency for easy bleeding [FreeTextEntry5] : HTN,HLD [FreeTextEntry7] : 40.9 BMI , morbid obesity  [FreeTextEntry8] : Kidney Transplant  [FreeTextEntry9] : Associated Diabetic Charcot foot  [de-identified] : plantar right foot midfoot , lateral  DFU 2  , large area of peripheral callus build up , the ulcer is clean and granular s/p surgical intervention , sutures intact - soi  [de-identified] : IDDM with neuropathy  [de-identified] : MARILUZ

## 2022-07-21 NOTE — PROCEDURE
[Saline] : saline [Scalpel] : scalpel [Skin] : skin [Subcutaneous Tissue] : subcutaneous tissue [Fenestrated] : fenestrated [Hydrated with saline] : hydrated with saline [Apligraf] : apligraf [____ % was used] : and [unfilled] % was used [____ % was discarded] : and [unfilled] % was discarded [FreeTextEntry1] : adaptic, calcium alginate, dry dressing [FreeTextEntry9] : 1011 [de-identified] : right foot plantar ulcer 5th metatarsal [de-identified] : carleen [de-identified] : mookie [FreeTextEntry6] : right foot plantar ulcer 5th metatarsal [FreeTextEntry7] : right foot plantar ulcer 5th metatarsal [de-identified] : skin, subcutaneous tissue, fat

## 2022-07-21 NOTE — ASSESSMENT
[Verbal] : Verbal [Written] : Written [Demo] : Demo [Patient] : Patient [Good - alert, interested, motivated] : Good - alert, interested, motivated [Demonstrates independently] : demonstrates independently [Dressing changes] : dressing changes [Foot Care] : foot care [Skin Care] : skin care [Pressure relief] : pressure relief [Signs and symptoms of infection] : sign and symptoms of infection [How and When to Call] : how and when to call [Hyperbaric Therapy] : hyperbaric therapy [Off-loading] : off-loading [Compression Therapy] : compression therapy [Patient responsibility to plan of care] : patient responsibility to plan of care [Glycemic Control] : glycemic control [Stable] : stable [Home] : Home [Ambulatory] : Ambulatory [] : No [FreeTextEntry2] : Apligraf Application\par Offloading/Pressure Relief  \par Infection Prevention \par Compression Therapy \par Offloading/Pressure Relief \par Encourage Glucose Contro\par HBOTl  [FreeTextEntry4] : Pt completed 23/30 HBOT.\par DPM applied Apligraf #1 Application. DPM ordered additional Apligraf. Submitted paperwork for Authorization of Apligraf #2.\par Pt to F/U to [facility] daily for HBOT & 1 Week for Assessment.

## 2022-07-22 ENCOUNTER — APPOINTMENT (OUTPATIENT)
Dept: HYPERBARIC MEDICINE | Facility: HOSPITAL | Age: 57
End: 2022-07-22

## 2022-07-22 ENCOUNTER — OUTPATIENT (OUTPATIENT)
Dept: OUTPATIENT SERVICES | Facility: HOSPITAL | Age: 57
LOS: 1 days | Discharge: HOME | End: 2022-07-22
Payer: MEDICARE

## 2022-07-22 VITALS
TEMPERATURE: 97.6 F | DIASTOLIC BLOOD PRESSURE: 80 MMHG | RESPIRATION RATE: 20 BRPM | OXYGEN SATURATION: 100 % | HEART RATE: 62 BPM | SYSTOLIC BLOOD PRESSURE: 150 MMHG

## 2022-07-22 VITALS
TEMPERATURE: 98.7 F | RESPIRATION RATE: 20 BRPM | DIASTOLIC BLOOD PRESSURE: 70 MMHG | HEART RATE: 62 BPM | SYSTOLIC BLOOD PRESSURE: 151 MMHG | OXYGEN SATURATION: 95 %

## 2022-07-22 DIAGNOSIS — L84 CORNS AND CALLOSITIES: ICD-10-CM

## 2022-07-22 DIAGNOSIS — E11.621 TYPE 2 DIABETES MELLITUS WITH FOOT ULCER: ICD-10-CM

## 2022-07-22 DIAGNOSIS — Z86.73 PERSONAL HISTORY OF TRANSIENT ISCHEMIC ATTACK (TIA), AND CEREBRAL INFARCTION WITHOUT RESIDUAL DEFICITS: ICD-10-CM

## 2022-07-22 DIAGNOSIS — Z89.421 ACQUIRED ABSENCE OF OTHER RIGHT TOE(S): Chronic | ICD-10-CM

## 2022-07-22 DIAGNOSIS — E66.01 MORBID (SEVERE) OBESITY DUE TO EXCESS CALORIES: ICD-10-CM

## 2022-07-22 DIAGNOSIS — Z89.422 ACQUIRED ABSENCE OF OTHER LEFT TOE(S): ICD-10-CM

## 2022-07-22 DIAGNOSIS — Z79.899 OTHER LONG TERM (CURRENT) DRUG THERAPY: ICD-10-CM

## 2022-07-22 DIAGNOSIS — Z79.4 LONG TERM (CURRENT) USE OF INSULIN: ICD-10-CM

## 2022-07-22 DIAGNOSIS — Z87.81 PERSONAL HISTORY OF (HEALED) TRAUMATIC FRACTURE: ICD-10-CM

## 2022-07-22 DIAGNOSIS — N18.6 END STAGE RENAL DISEASE: Chronic | ICD-10-CM

## 2022-07-22 DIAGNOSIS — Z94.0 KIDNEY TRANSPLANT STATUS: Chronic | ICD-10-CM

## 2022-07-22 DIAGNOSIS — Z94.0 KIDNEY TRANSPLANT STATUS: ICD-10-CM

## 2022-07-22 DIAGNOSIS — Z86.718 PERSONAL HISTORY OF OTHER VENOUS THROMBOSIS AND EMBOLISM: ICD-10-CM

## 2022-07-22 DIAGNOSIS — L97.412 NON-PRESSURE CHRONIC ULCER OF RIGHT HEEL AND MIDFOOT WITH FAT LAYER EXPOSED: ICD-10-CM

## 2022-07-22 DIAGNOSIS — E11.610 TYPE 2 DIABETES MELLITUS WITH DIABETIC NEUROPATHIC ARTHROPATHY: ICD-10-CM

## 2022-07-22 DIAGNOSIS — G47.33 OBSTRUCTIVE SLEEP APNEA (ADULT) (PEDIATRIC): ICD-10-CM

## 2022-07-22 DIAGNOSIS — Z89.411 ACQUIRED ABSENCE OF RIGHT GREAT TOE: ICD-10-CM

## 2022-07-22 DIAGNOSIS — E78.5 HYPERLIPIDEMIA, UNSPECIFIED: ICD-10-CM

## 2022-07-22 DIAGNOSIS — Z79.82 LONG TERM (CURRENT) USE OF ASPIRIN: ICD-10-CM

## 2022-07-22 DIAGNOSIS — Z85.828 PERSONAL HISTORY OF OTHER MALIGNANT NEOPLASM OF SKIN: ICD-10-CM

## 2022-07-22 DIAGNOSIS — Z86.16 PERSONAL HISTORY OF COVID-19: ICD-10-CM

## 2022-07-22 LAB — SARS-COV-2 RNA SPEC QL NAA+PROBE: SIGNIFICANT CHANGE UP

## 2022-07-22 PROCEDURE — G0277: CPT

## 2022-07-22 PROCEDURE — 99183 HYPERBARIC OXYGEN THERAPY: CPT

## 2022-07-22 PROCEDURE — U0005: CPT

## 2022-07-22 PROCEDURE — 82962 GLUCOSE BLOOD TEST: CPT

## 2022-07-22 PROCEDURE — U0003: CPT

## 2022-07-22 NOTE — ADDENDUM
[FreeTextEntry1] : PT ARRIVED AMBULATORY A&OX3.\par ALL VITALS WITHIN PARAMETERS FOR HBOT.\par BLANKET ROLL PLACED OVER FOOT BAR FOR SKIN PROTECTION.\par INSULIN PUMP PORT COVERED.\par SCANT DRAINAGE NOTED PRIOR TO DESCENT. APLIGRAF APPLIED ON 07/21/22, WOUND CARE DEFERRED BY NURSE.\par COVID SWAB TO BE OBTAINED POST HBOT.\par PT DESCENT TO RX TX DEPTH IN CHAMBER 1 WAS WITHOUT INCIDENT. PT RESTING AT DEPTH, CHEST RISE AND FALL OBSERVED.\par PT TOLERATED AIR BREAKS WELL.\par PT ASCENT WAS WITHOUT INCIDENT. PT TOLERATED HBOT WELL.\par \par CHT HERMELINDO GIRALDO 07/22/22\par

## 2022-07-22 NOTE — PROCEDURE
[Outpatient] : Outpatient [Ambulatory] : Patient is ambulatory. [THIS CHAMBER HAS BEEN CLEANED / DISINFECTED] : This chamber has been cleaned / disinfected according to local and hospital policy and procedure prior to this treatment. [Patient demonstrated and verbalized proper technique for using air break mask] : Patient demonstrated and verbalized proper technique for using air break mask [Patient educated on the risks of SMOKING prior to HBOT with understanding] : Patient educated on the risks of SMOKING prior to HBOT with understanding [Patient educated on the risks of CONSUMING ALCOHOL prior to HBOT with understanding] : Patient educated on the risks of CONSUMING ALCOHOL prior to HBOT with understanding [100% Cotton] : 100% cotton [Empty all pockets] : empty all pockets [No hair oils, wigs, hairpieces, pins] : no hair oils, wigs, hairpieces, pins  [Pre tx medications] : pre tx medications  [No make-up, creams] : no make-up, creams  [No jewelry] : no jewelry  [No matches, cigarettes, lighters] : no matches, cigarettes, lighters  [Hearing aid removed] : hearing aid removed [Dentures removed] : dentures removed [Ground bracelet on pt's wrist] : ground bracelet on pt's wrist  [Contacts removed] : contacts removed  [Remove nail polish] : remove nail polish  [No reading material] : no reading material  [Bra, undergarments removed] : bra, undergarments removed  [No contraindicated dressings] : no contraindicated dressings [Ground Wire - VISUAL Verification - Intact/Free of Obstruction] : Ground Wire - VISUAL Verification - Intact/Free of Obstruction  [Ground Continuity - Verified < 1 ohm w/ Wrist Strap Gatito] : Ground Continuity - Verified < 1 ohm w/ Wrist Strap Gatito [Number: ___] : Number: [unfilled] [Diagnosis: ___] : Diagnosis: [unfilled] [____] : Post-Dive: Time - [unfilled] [___] : Post-Dive: Value - [unfilled] mg/dL [Clear all fields] : clear all fields [] : No [FreeTextEntry4] : 100 MIN [FreeTextEntry6] : 8:18 [FreeTextEntry8] : 8:28 [de-identified] : 8:58 [de-identified] : 9:03 [de-identified] : 9:33 [de-identified] : 9:38 [de-identified] : 10:08 [de-identified] : 10:18 [de-identified] : 120 MIN

## 2022-07-25 ENCOUNTER — APPOINTMENT (OUTPATIENT)
Dept: HYPERBARIC MEDICINE | Facility: HOSPITAL | Age: 57
End: 2022-07-25

## 2022-07-25 ENCOUNTER — OUTPATIENT (OUTPATIENT)
Dept: OUTPATIENT SERVICES | Facility: HOSPITAL | Age: 57
LOS: 1 days | Discharge: HOME | End: 2022-07-25
Payer: MEDICARE

## 2022-07-25 VITALS
DIASTOLIC BLOOD PRESSURE: 81 MMHG | RESPIRATION RATE: 20 BRPM | HEART RATE: 57 BPM | SYSTOLIC BLOOD PRESSURE: 159 MMHG | TEMPERATURE: 98.5 F | OXYGEN SATURATION: 97 %

## 2022-07-25 VITALS
DIASTOLIC BLOOD PRESSURE: 85 MMHG | SYSTOLIC BLOOD PRESSURE: 155 MMHG | TEMPERATURE: 98.5 F | HEART RATE: 57 BPM | OXYGEN SATURATION: 97 % | RESPIRATION RATE: 20 BRPM

## 2022-07-25 DIAGNOSIS — Z89.421 ACQUIRED ABSENCE OF OTHER RIGHT TOE(S): Chronic | ICD-10-CM

## 2022-07-25 DIAGNOSIS — Z79.4 LONG TERM (CURRENT) USE OF INSULIN: ICD-10-CM

## 2022-07-25 DIAGNOSIS — E11.621 TYPE 2 DIABETES MELLITUS WITH FOOT ULCER: ICD-10-CM

## 2022-07-25 DIAGNOSIS — Z94.0 KIDNEY TRANSPLANT STATUS: Chronic | ICD-10-CM

## 2022-07-25 DIAGNOSIS — L97.412 NON-PRESSURE CHRONIC ULCER OF RIGHT HEEL AND MIDFOOT WITH FAT LAYER EXPOSED: ICD-10-CM

## 2022-07-25 DIAGNOSIS — N18.6 END STAGE RENAL DISEASE: Chronic | ICD-10-CM

## 2022-07-25 PROCEDURE — 99183 HYPERBARIC OXYGEN THERAPY: CPT

## 2022-07-25 PROCEDURE — G0277: CPT

## 2022-07-25 PROCEDURE — 82962 GLUCOSE BLOOD TEST: CPT

## 2022-07-25 NOTE — ADDENDUM
[FreeTextEntry1] : PT ARRIVED AMBULATORY A&OX4.\par ALL VITALS WITHIN PARAMETERS FOR HBOT.\par PT AWAITING MD ARRIVAL TO START DESCENT.\par BLANKET ROLL PLACED AT FOOT OF STRETCHER TO PREVENT SKIN CONTACT.\par DRAINAGE NOTED ON DRESSING PRIOR TO DESCENT WAS EVALUATED BY NURSE. PER NURSE,NO DRESSING CHANGE REQUIRED ON 07/25/22.\par PT DESCENT TO RX TX DEPTH IN CHAMBER 1  WAS WITHOUT INCIDENT.\par PT RESTING AT DEPTH, CHEST RISE AND FALL OBSERVED.\par PT TOLERATED AIR BREAKS WELL.\par PT ASCENT WAS WITHOUT INCIDENT. PT TOLERATED HBOT WELL.\par \par CHT HERMELINDO GIRALDO  07/25/22

## 2022-07-25 NOTE — PROCEDURE
[Outpatient] : Outpatient [Ambulatory] : Patient is ambulatory. [THIS CHAMBER HAS BEEN CLEANED / DISINFECTED] : This chamber has been cleaned / disinfected according to local and hospital policy and procedure prior to this treatment. [Patient demonstrated and verbalized proper technique for using air break mask] : Patient demonstrated and verbalized proper technique for using air break mask [Patient educated on the risks of SMOKING prior to HBOT with understanding] : Patient educated on the risks of SMOKING prior to HBOT with understanding [Patient educated on the risks of CONSUMING ALCOHOL prior to HBOT with understanding] : Patient educated on the risks of CONSUMING ALCOHOL prior to HBOT with understanding [100% Cotton] : 100% cotton [Empty all pockets] : empty all pockets [No hair oils, wigs, hairpieces, pins] : no hair oils, wigs, hairpieces, pins  [Pre tx medications] : pre tx medications  [No make-up, creams] : no make-up, creams  [No jewelry] : no jewelry  [No matches, cigarettes, lighters] : no matches, cigarettes, lighters  [Hearing aid removed] : hearing aid removed [Dentures removed] : dentures removed [Ground bracelet on pt's wrist] : ground bracelet on pt's wrist  [Contacts removed] : contacts removed  [Remove nail polish] : remove nail polish  [No reading material] : no reading material  [Bra, undergarments removed] : bra, undergarments removed  [No contraindicated dressings] : no contraindicated dressings [Ground Wire - VISUAL Verification - Intact/Free of Obstruction] : Ground Wire - VISUAL Verification - Intact/Free of Obstruction  [Ground Continuity - Verified < 1 ohm w/ Wrist Strap Gatito] : Ground Continuity - Verified < 1 ohm w/ Wrist Strap Gatito [Number: ___] : Number: [unfilled] [Diagnosis: ___] : Diagnosis: [unfilled] [____] : Post-Dive: Time - [unfilled] [___] : Post-Dive: Value - [unfilled] mg/dL [Clear all fields] : clear all fields [] : No [FreeTextEntry4] : 100 MIN [FreeTextEntry6] : 8:25 [FreeTextEntry8] : 8:35 [de-identified] : 9:05 [de-identified] : 9:10 [de-identified] : 9:40 [de-identified] : 9:45 [de-identified] : 10:15 [de-identified] : 10:25 [de-identified] : 120 MIN

## 2022-07-26 ENCOUNTER — APPOINTMENT (OUTPATIENT)
Dept: HYPERBARIC MEDICINE | Facility: HOSPITAL | Age: 57
End: 2022-07-26

## 2022-07-26 ENCOUNTER — OUTPATIENT (OUTPATIENT)
Dept: OUTPATIENT SERVICES | Facility: HOSPITAL | Age: 57
LOS: 1 days | Discharge: HOME | End: 2022-07-26
Payer: MEDICARE

## 2022-07-26 VITALS
TEMPERATURE: 98.4 F | SYSTOLIC BLOOD PRESSURE: 144 MMHG | RESPIRATION RATE: 18 BRPM | OXYGEN SATURATION: 98 % | HEART RATE: 57 BPM | DIASTOLIC BLOOD PRESSURE: 74 MMHG

## 2022-07-26 VITALS
HEART RATE: 61 BPM | SYSTOLIC BLOOD PRESSURE: 143 MMHG | RESPIRATION RATE: 20 BRPM | DIASTOLIC BLOOD PRESSURE: 72 MMHG | OXYGEN SATURATION: 95 % | TEMPERATURE: 98.5 F

## 2022-07-26 DIAGNOSIS — E11.621 TYPE 2 DIABETES MELLITUS WITH FOOT ULCER: ICD-10-CM

## 2022-07-26 DIAGNOSIS — Z94.0 KIDNEY TRANSPLANT STATUS: Chronic | ICD-10-CM

## 2022-07-26 DIAGNOSIS — N18.6 END STAGE RENAL DISEASE: Chronic | ICD-10-CM

## 2022-07-26 DIAGNOSIS — Z89.421 ACQUIRED ABSENCE OF OTHER RIGHT TOE(S): Chronic | ICD-10-CM

## 2022-07-26 DIAGNOSIS — Z79.4 LONG TERM (CURRENT) USE OF INSULIN: ICD-10-CM

## 2022-07-26 DIAGNOSIS — L97.412 NON-PRESSURE CHRONIC ULCER OF RIGHT HEEL AND MIDFOOT WITH FAT LAYER EXPOSED: ICD-10-CM

## 2022-07-26 PROCEDURE — G0277: CPT

## 2022-07-26 PROCEDURE — 99183 HYPERBARIC OXYGEN THERAPY: CPT

## 2022-07-26 PROCEDURE — 82962 GLUCOSE BLOOD TEST: CPT

## 2022-07-26 NOTE — PROCEDURE
[Outpatient] : Outpatient [Ambulatory] : Patient is ambulatory. [THIS CHAMBER HAS BEEN CLEANED / DISINFECTED] : This chamber has been cleaned / disinfected according to local and hospital policy and procedure prior to this treatment. [Patient demonstrated and verbalized proper technique for using air break mask] : Patient demonstrated and verbalized proper technique for using air break mask [Patient educated on the risks of SMOKING prior to HBOT with understanding] : Patient educated on the risks of SMOKING prior to HBOT with understanding [Patient educated on the risks of CONSUMING ALCOHOL prior to HBOT with understanding] : Patient educated on the risks of CONSUMING ALCOHOL prior to HBOT with understanding [100% Cotton] : 100% cotton [Empty all pockets] : empty all pockets [No hair oils, wigs, hairpieces, pins] : no hair oils, wigs, hairpieces, pins  [Pre tx medications] : pre tx medications  [No make-up, creams] : no make-up, creams  [No jewelry] : no jewelry  [No matches, cigarettes, lighters] : no matches, cigarettes, lighters  [Hearing aid removed] : hearing aid removed [Dentures removed] : dentures removed [Ground bracelet on pt's wrist] : ground bracelet on pt's wrist  [Contacts removed] : contacts removed  [Remove nail polish] : remove nail polish  [No reading material] : no reading material  [Bra, undergarments removed] : bra, undergarments removed  [No contraindicated dressings] : no contraindicated dressings [Ground Wire - VISUAL Verification - Intact/Free of Obstruction] : Ground Wire - VISUAL Verification - Intact/Free of Obstruction  [Ground Continuity - Verified < 1 ohm w/ Wrist Strap Gatito] : Ground Continuity - Verified < 1 ohm w/ Wrist Strap Gatito [Number: ___] : Number: [unfilled] [Diagnosis: ___] : Diagnosis: [unfilled] [____] : Post-Dive: Time - [unfilled] [___] : Post-Dive: Value - [unfilled] mg/dL [Clear all fields] : clear all fields [] : No [FreeTextEntry4] : 100 [FreeTextEntry6] : 2863 [FreeTextEntry8] : 7810 [de-identified] : 6075 [de-identified] : 3412 [de-identified] : 0933 [de-identified] : 7402 [de-identified] : 102 [de-identified] : 1035 [de-identified] : 120 MINUTES

## 2022-07-26 NOTE — ADDENDUM
[FreeTextEntry1] : PTS PRE PROCEDURAL CHECKLIST WAS DONE BY CHT/CHRN AND HT SUCCESSFULLY PRE HBOT\par PT DESCENDED TO 2.4 DEEPAK @ 2.2 PSI/MIN WITHOUT INCIDENT IN CHAMBER #3\par PT RESTING AT TX DEPTH WITH VISIBLE CHEST RISE AND FALL OBSERVED CHAMBERSIDE \par PT TOLERATED AIR BREAKS WELL\par PT ASCENDED FROM 2.4 DEEPAK @ 2.2 PSI/MIN WITHOUT INCIDENT\par PT TOLERATED TX WELL\par

## 2022-07-26 NOTE — ADDENDUM
[FreeTextEntry1] : Patients Order Verified Prior to Treatment. \par Patients Secondary Contraindication Examination Check Preformed by CHT. \par Patients Contraindication List and Pre-Check List, Verified and Signed by Technician and CHT Prior to Patients Treatment. \par \par Patient descended to 2.4 DEEPAK @ 2.2 PSI/MIN without incident in chamber #1.\par Patient resting comfortably @ depth, equal chest rise observed throughout treatment.\par Patient tolerated both air breaks well.\par Patient ascended from 2.4 DEEPAK @ 2.2 PSI/MIN without incident in chamber #1.\par Patient tolerated treatment well.\par Patient Exited the Hyperbaric Suite Safely\par \par Ace applied post tx. \par \par

## 2022-07-26 NOTE — PROCEDURE
[Outpatient] : Outpatient [Ambulatory] : Patient is ambulatory. [THIS CHAMBER HAS BEEN CLEANED / DISINFECTED] : This chamber has been cleaned / disinfected according to local and hospital policy and procedure prior to this treatment. [Patient demonstrated and verbalized proper technique for using air break mask] : Patient demonstrated and verbalized proper technique for using air break mask [Patient educated on the risks of SMOKING prior to HBOT with understanding] : Patient educated on the risks of SMOKING prior to HBOT with understanding [Patient educated on the risks of CONSUMING ALCOHOL prior to HBOT with understanding] : Patient educated on the risks of CONSUMING ALCOHOL prior to HBOT with understanding [100% Cotton] : 100% cotton [Empty all pockets] : empty all pockets [No hair oils, wigs, hairpieces, pins] : no hair oils, wigs, hairpieces, pins  [Pre tx medications] : pre tx medications  [No make-up, creams] : no make-up, creams  [No jewelry] : no jewelry  [No matches, cigarettes, lighters] : no matches, cigarettes, lighters  [Hearing aid removed] : hearing aid removed [Dentures removed] : dentures removed [Ground bracelet on pt's wrist] : ground bracelet on pt's wrist  [Contacts removed] : contacts removed  [Remove nail polish] : remove nail polish  [No reading material] : no reading material  [Bra, undergarments removed] : bra, undergarments removed  [No contraindicated dressings] : no contraindicated dressings [Ground Wire - VISUAL Verification - Intact/Free of Obstruction] : Ground Wire - VISUAL Verification - Intact/Free of Obstruction  [Ground Continuity - Verified < 1 ohm w/ Wrist Strap Gatito] : Ground Continuity - Verified < 1 ohm w/ Wrist Strap Gatito [Number: ___] : Number: [unfilled] [Diagnosis: ___] : Diagnosis: [unfilled] [Clear all fields] : clear all fields [____] : Post-Dive: Time - [unfilled] [___] : Post-Dive: Value - [unfilled] mg/dL [] : No [FreeTextEntry4] : 100 [FreeTextEntry6] : 1934 [FreeTextEntry8] : 4362 [de-identified] : 5341 [de-identified] : 1224 [de-identified] : 0953 [de-identified] : 3160 [de-identified] : 1010 [de-identified] : 1023 [de-identified] : 120mins

## 2022-07-27 ENCOUNTER — NON-APPOINTMENT (OUTPATIENT)
Age: 57
End: 2022-07-27

## 2022-07-27 ENCOUNTER — APPOINTMENT (OUTPATIENT)
Dept: HYPERBARIC MEDICINE | Facility: HOSPITAL | Age: 57
End: 2022-07-27

## 2022-07-27 ENCOUNTER — OUTPATIENT (OUTPATIENT)
Dept: OUTPATIENT SERVICES | Facility: HOSPITAL | Age: 57
LOS: 1 days | Discharge: HOME | End: 2022-07-27
Payer: MEDICARE

## 2022-07-27 VITALS
HEART RATE: 60 BPM | SYSTOLIC BLOOD PRESSURE: 143 MMHG | OXYGEN SATURATION: 96 % | TEMPERATURE: 96.9 F | DIASTOLIC BLOOD PRESSURE: 77 MMHG | RESPIRATION RATE: 20 BRPM

## 2022-07-27 VITALS
DIASTOLIC BLOOD PRESSURE: 79 MMHG | SYSTOLIC BLOOD PRESSURE: 163 MMHG | TEMPERATURE: 97.9 F | HEART RATE: 62 BPM | RESPIRATION RATE: 20 BRPM | OXYGEN SATURATION: 95 %

## 2022-07-27 DIAGNOSIS — E11.621 TYPE 2 DIABETES MELLITUS WITH FOOT ULCER: ICD-10-CM

## 2022-07-27 DIAGNOSIS — N18.6 END STAGE RENAL DISEASE: Chronic | ICD-10-CM

## 2022-07-27 DIAGNOSIS — Z79.4 LONG TERM (CURRENT) USE OF INSULIN: ICD-10-CM

## 2022-07-27 DIAGNOSIS — L97.412 NON-PRESSURE CHRONIC ULCER OF RIGHT HEEL AND MIDFOOT WITH FAT LAYER EXPOSED: ICD-10-CM

## 2022-07-27 DIAGNOSIS — Z89.421 ACQUIRED ABSENCE OF OTHER RIGHT TOE(S): Chronic | ICD-10-CM

## 2022-07-27 DIAGNOSIS — Z94.0 KIDNEY TRANSPLANT STATUS: Chronic | ICD-10-CM

## 2022-07-27 PROCEDURE — G0277: CPT

## 2022-07-27 PROCEDURE — 82962 GLUCOSE BLOOD TEST: CPT

## 2022-07-27 PROCEDURE — 99183 HYPERBARIC OXYGEN THERAPY: CPT

## 2022-07-28 ENCOUNTER — APPOINTMENT (OUTPATIENT)
Dept: WOUND CARE | Facility: HOSPITAL | Age: 57
End: 2022-07-28

## 2022-07-28 ENCOUNTER — APPOINTMENT (OUTPATIENT)
Dept: HYPERBARIC MEDICINE | Facility: HOSPITAL | Age: 57
End: 2022-07-28

## 2022-07-28 ENCOUNTER — OUTPATIENT (OUTPATIENT)
Dept: OUTPATIENT SERVICES | Facility: HOSPITAL | Age: 57
LOS: 1 days | Discharge: HOME | End: 2022-07-28
Payer: MEDICARE

## 2022-07-28 VITALS
OXYGEN SATURATION: 96 % | WEIGHT: 315 LBS | SYSTOLIC BLOOD PRESSURE: 164 MMHG | TEMPERATURE: 97.7 F | HEART RATE: 58 BPM | BODY MASS INDEX: 41.75 KG/M2 | DIASTOLIC BLOOD PRESSURE: 75 MMHG | RESPIRATION RATE: 20 BRPM | HEIGHT: 73 IN

## 2022-07-28 DIAGNOSIS — Z89.422 ACQUIRED ABSENCE OF OTHER LEFT TOE(S): ICD-10-CM

## 2022-07-28 DIAGNOSIS — Z87.81 PERSONAL HISTORY OF (HEALED) TRAUMATIC FRACTURE: ICD-10-CM

## 2022-07-28 DIAGNOSIS — Z79.82 LONG TERM (CURRENT) USE OF ASPIRIN: ICD-10-CM

## 2022-07-28 DIAGNOSIS — L97.412 NON-PRESSURE CHRONIC ULCER OF RIGHT HEEL AND MIDFOOT WITH FAT LAYER EXPOSED: ICD-10-CM

## 2022-07-28 DIAGNOSIS — Z85.828 PERSONAL HISTORY OF OTHER MALIGNANT NEOPLASM OF SKIN: ICD-10-CM

## 2022-07-28 DIAGNOSIS — Z86.718 PERSONAL HISTORY OF OTHER VENOUS THROMBOSIS AND EMBOLISM: ICD-10-CM

## 2022-07-28 DIAGNOSIS — E78.5 HYPERLIPIDEMIA, UNSPECIFIED: ICD-10-CM

## 2022-07-28 DIAGNOSIS — Z89.421 ACQUIRED ABSENCE OF OTHER RIGHT TOE(S): ICD-10-CM

## 2022-07-28 DIAGNOSIS — L84 CORNS AND CALLOSITIES: ICD-10-CM

## 2022-07-28 DIAGNOSIS — Z79.899 OTHER LONG TERM (CURRENT) DRUG THERAPY: ICD-10-CM

## 2022-07-28 DIAGNOSIS — Z94.0 KIDNEY TRANSPLANT STATUS: Chronic | ICD-10-CM

## 2022-07-28 DIAGNOSIS — Z86.16 PERSONAL HISTORY OF COVID-19: ICD-10-CM

## 2022-07-28 DIAGNOSIS — E11.621 TYPE 2 DIABETES MELLITUS WITH FOOT ULCER: ICD-10-CM

## 2022-07-28 DIAGNOSIS — N18.6 END STAGE RENAL DISEASE: Chronic | ICD-10-CM

## 2022-07-28 DIAGNOSIS — G47.33 OBSTRUCTIVE SLEEP APNEA (ADULT) (PEDIATRIC): ICD-10-CM

## 2022-07-28 DIAGNOSIS — E11.51 TYPE 2 DIABETES MELLITUS WITH DIABETIC PERIPHERAL ANGIOPATHY WITHOUT GANGRENE: ICD-10-CM

## 2022-07-28 DIAGNOSIS — Z89.421 ACQUIRED ABSENCE OF OTHER RIGHT TOE(S): Chronic | ICD-10-CM

## 2022-07-28 DIAGNOSIS — Z94.0 KIDNEY TRANSPLANT STATUS: ICD-10-CM

## 2022-07-28 DIAGNOSIS — Z79.4 LONG TERM (CURRENT) USE OF INSULIN: ICD-10-CM

## 2022-07-28 DIAGNOSIS — Z86.73 PERSONAL HISTORY OF TRANSIENT ISCHEMIC ATTACK (TIA), AND CEREBRAL INFARCTION WITHOUT RESIDUAL DEFICITS: ICD-10-CM

## 2022-07-28 PROCEDURE — 11042 DBRDMT SUBQ TIS 1ST 20SQCM/<: CPT | Mod: 79

## 2022-07-28 PROCEDURE — 15272 SKIN SUB GRAFT T/A/L ADD-ON: CPT

## 2022-07-28 PROCEDURE — 15271 SKIN SUB GRAFT TRNK/ARM/LEG: CPT

## 2022-07-28 NOTE — ASSESSMENT
[Verbal] : Verbal [Written] : Written [Demo] : Demo [Patient] : Patient [Good - alert, interested, motivated] : Good - alert, interested, motivated [Verbalizes knowledge/Understanding] : Verbalizes knowledge/understanding [Dressing changes] : dressing changes [Foot Care] : foot care [Skin Care] : skin care [Signs and symptoms of infection] : sign and symptoms of infection [Nutrition] : nutrition [How and When to Call] : how and when to call [Pain Management] : pain management [Hyperbaric Therapy] : hyperbaric therapy [Off-loading] : off-loading [Glycemic Control] : glycemic control [] : Yes [Stable] : stable [Home] : Home [Ambulatory] : Ambulatory [Not Applicable - Long Term Care/Home Health Agency] : Long Term Care/Home Health Agency: Not Applicable [FreeTextEntry2] : Infection prevention\par Localized wound care \par Goal remaining pain free regarding wounds\par Offloading \par Promote optimal nutrition \par Skin substitute therapy \par Hyperbaric oxygen therapy  [FreeTextEntry4] : auth submitted for Apligraf #3\par Auth submitted for + 10 HBO = 27/40\par Assessment in 1 week

## 2022-07-28 NOTE — REASON FOR VISIT
[Apligraf] : apligraf [FreeTextEntry5] : Right plantar foot  [FreeTextEntry4] : DFU 3 plantar lateral right foot , clean , granular

## 2022-07-28 NOTE — PHYSICAL EXAM
[0] : left 0 [1+] : left 1+ [Skin Ulcer] : ulcer [Alert] : alert [Oriented to Person] : oriented to person [Oriented to Place] : oriented to place [Oriented to Time] : oriented to time [Calm] : calm [4 x 4] : 4 x 4  [Abdominal Pad] : Abdominal Pad [Varicose Veins Of Lower Extremities] : not present [] : not present [Purpura] : no purpura  [Petechiae] : no petechiae [Skin Induration] : no induration [de-identified] : adult WM, NAD, alert, Ox 3. [de-identified] : HTN, HLD [de-identified] : Diabetic Charcot right foot deformity [de-identified] : right foot plantar ulcer 5th metatarsal healing , head of 5th metatarsal healing well , smaller and closing  [de-identified] : Diabetic neuropathy  [FreeTextEntry7] : Right plantar foot  [FreeTextEntry8] : 1.7 [FreeTextEntry9] : 2.1 [de-identified] : 0.2 [de-identified] : Callus  [de-identified] : Apligraf  [de-identified] : Apligraf  [de-identified] : Mechanically cleansed with sterile gauze and normal saline.\par Kerlix \par Post debridement measurements: 1.8/2.2/0.2\par * bandage applied and callus shaved by DPM \par \par 1 unit of 44sq/cm applied\par 50% used, 50% discarded \par LOT: [id number]\par PO: [phone] \par EXP: 7/28/22\par \par Irrigated with 0.9% normal saline \par LOT: [id number]\par EXP: 09/01/24 [de-identified] : Moderate [de-identified] : other [de-identified] : None [de-identified] : None [de-identified] : 100% [de-identified] : No [TWNoteComboBox8] : Luzmaria [de-identified] : Application of skin substitute [de-identified] : Weekly [de-identified] : Primary Dressing

## 2022-07-28 NOTE — PROCEDURE
[Saline] : saline [Scalpel] : scalpel [Other: ___] : [unfilled] [Fenestrated] : fenestrated [Hydrated with saline] : hydrated with saline [Apligraf] : apligraf [____ % was used] : and [unfilled] % was used [____ % was discarded] : and [unfilled] % was discarded [FreeTextEntry1] : alginate [] : No [FreeTextEntry9] : 0957hr [de-identified] : DFU 3 plantar right  [de-identified] : Teodora [FreeTextEntry6] : DFU 3 plantar right  [FreeTextEntry7] : same [de-identified] : 0 [de-identified] : 3cc [de-identified] : allen

## 2022-07-29 ENCOUNTER — APPOINTMENT (OUTPATIENT)
Dept: HYPERBARIC MEDICINE | Facility: HOSPITAL | Age: 57
End: 2022-07-29

## 2022-07-29 ENCOUNTER — NON-APPOINTMENT (OUTPATIENT)
Age: 57
End: 2022-07-29

## 2022-07-29 ENCOUNTER — OUTPATIENT (OUTPATIENT)
Dept: OUTPATIENT SERVICES | Facility: HOSPITAL | Age: 57
LOS: 1 days | Discharge: HOME | End: 2022-07-29
Payer: MEDICARE

## 2022-07-29 VITALS
HEART RATE: 59 BPM | OXYGEN SATURATION: 98 % | SYSTOLIC BLOOD PRESSURE: 160 MMHG | DIASTOLIC BLOOD PRESSURE: 86 MMHG | RESPIRATION RATE: 20 BRPM | TEMPERATURE: 97.1 F

## 2022-07-29 VITALS
RESPIRATION RATE: 18 BRPM | DIASTOLIC BLOOD PRESSURE: 82 MMHG | SYSTOLIC BLOOD PRESSURE: 170 MMHG | OXYGEN SATURATION: 95 % | HEART RATE: 78 BPM | TEMPERATURE: 98 F

## 2022-07-29 DIAGNOSIS — E11.621 TYPE 2 DIABETES MELLITUS WITH FOOT ULCER: ICD-10-CM

## 2022-07-29 DIAGNOSIS — Z89.421 ACQUIRED ABSENCE OF OTHER RIGHT TOE(S): Chronic | ICD-10-CM

## 2022-07-29 DIAGNOSIS — N18.6 END STAGE RENAL DISEASE: Chronic | ICD-10-CM

## 2022-07-29 DIAGNOSIS — Z94.0 KIDNEY TRANSPLANT STATUS: Chronic | ICD-10-CM

## 2022-07-29 LAB — SARS-COV-2 RNA SPEC QL NAA+PROBE: SIGNIFICANT CHANGE UP

## 2022-07-29 PROCEDURE — G0277: CPT

## 2022-07-29 PROCEDURE — U0005: CPT

## 2022-07-29 PROCEDURE — U0003: CPT

## 2022-07-29 PROCEDURE — 99183 HYPERBARIC OXYGEN THERAPY: CPT

## 2022-07-29 PROCEDURE — 82962 GLUCOSE BLOOD TEST: CPT

## 2022-07-30 ENCOUNTER — OUTPATIENT (OUTPATIENT)
Dept: OUTPATIENT SERVICES | Facility: HOSPITAL | Age: 57
LOS: 1 days | Discharge: HOME | End: 2022-07-30
Payer: MEDICARE

## 2022-07-30 ENCOUNTER — APPOINTMENT (OUTPATIENT)
Dept: HYPERBARIC MEDICINE | Facility: HOSPITAL | Age: 57
End: 2022-07-30

## 2022-07-30 VITALS
OXYGEN SATURATION: 99 % | DIASTOLIC BLOOD PRESSURE: 78 MMHG | TEMPERATURE: 98.6 F | RESPIRATION RATE: 16 BRPM | SYSTOLIC BLOOD PRESSURE: 170 MMHG | HEART RATE: 99 BPM

## 2022-07-30 VITALS
OXYGEN SATURATION: 100 % | HEART RATE: 65 BPM | DIASTOLIC BLOOD PRESSURE: 77 MMHG | TEMPERATURE: 97.2 F | RESPIRATION RATE: 16 BRPM | SYSTOLIC BLOOD PRESSURE: 145 MMHG

## 2022-07-30 DIAGNOSIS — E11.621 TYPE 2 DIABETES MELLITUS WITH FOOT ULCER: ICD-10-CM

## 2022-07-30 DIAGNOSIS — L97.412 NON-PRESSURE CHRONIC ULCER OF RIGHT HEEL AND MIDFOOT WITH FAT LAYER EXPOSED: ICD-10-CM

## 2022-07-30 DIAGNOSIS — Z94.0 KIDNEY TRANSPLANT STATUS: Chronic | ICD-10-CM

## 2022-07-30 DIAGNOSIS — N18.6 END STAGE RENAL DISEASE: Chronic | ICD-10-CM

## 2022-07-30 DIAGNOSIS — Z89.421 ACQUIRED ABSENCE OF OTHER RIGHT TOE(S): Chronic | ICD-10-CM

## 2022-07-30 DIAGNOSIS — Z79.4 LONG TERM (CURRENT) USE OF INSULIN: ICD-10-CM

## 2022-07-30 DIAGNOSIS — Z20.822 CONTACT WITH AND (SUSPECTED) EXPOSURE TO COVID-19: ICD-10-CM

## 2022-07-30 PROCEDURE — G0277: CPT

## 2022-07-30 PROCEDURE — 99183 HYPERBARIC OXYGEN THERAPY: CPT

## 2022-08-01 ENCOUNTER — APPOINTMENT (OUTPATIENT)
Dept: HYPERBARIC MEDICINE | Facility: HOSPITAL | Age: 57
End: 2022-08-01

## 2022-08-01 ENCOUNTER — OUTPATIENT (OUTPATIENT)
Dept: OUTPATIENT SERVICES | Facility: HOSPITAL | Age: 57
LOS: 1 days | End: 2022-08-01
Payer: MEDICARE

## 2022-08-01 ENCOUNTER — APPOINTMENT (OUTPATIENT)
Dept: CARDIOLOGY | Facility: CLINIC | Age: 57
End: 2022-08-01

## 2022-08-01 VITALS
SYSTOLIC BLOOD PRESSURE: 170 MMHG | HEART RATE: 55 BPM | DIASTOLIC BLOOD PRESSURE: 82 MMHG | RESPIRATION RATE: 20 BRPM | TEMPERATURE: 98.1 F | OXYGEN SATURATION: 95 %

## 2022-08-01 VITALS
SYSTOLIC BLOOD PRESSURE: 152 MMHG | TEMPERATURE: 97.4 F | OXYGEN SATURATION: 95 % | DIASTOLIC BLOOD PRESSURE: 81 MMHG | HEART RATE: 55 BPM | RESPIRATION RATE: 20 BRPM

## 2022-08-01 DIAGNOSIS — Z94.0 KIDNEY TRANSPLANT STATUS: Chronic | ICD-10-CM

## 2022-08-01 DIAGNOSIS — L97.301 NON-PRESSURE CHRONIC ULCER OF UNSPECIFIED ANKLE LIMITED TO BREAKDOWN OF SKIN: ICD-10-CM

## 2022-08-01 DIAGNOSIS — N18.6 END STAGE RENAL DISEASE: Chronic | ICD-10-CM

## 2022-08-01 DIAGNOSIS — Z89.421 ACQUIRED ABSENCE OF OTHER RIGHT TOE(S): Chronic | ICD-10-CM

## 2022-08-01 PROCEDURE — 78452 HT MUSCLE IMAGE SPECT MULT: CPT

## 2022-08-01 PROCEDURE — A9500: CPT

## 2022-08-01 PROCEDURE — 93015 CV STRESS TEST SUPVJ I&R: CPT

## 2022-08-01 PROCEDURE — 99183 HYPERBARIC OXYGEN THERAPY: CPT

## 2022-08-01 PROCEDURE — 82962 GLUCOSE BLOOD TEST: CPT

## 2022-08-01 NOTE — PROCEDURE
[Outpatient] : Outpatient [Ambulatory] : Patient is ambulatory. [THIS CHAMBER HAS BEEN CLEANED / DISINFECTED] : This chamber has been cleaned / disinfected according to local and hospital policy and procedure prior to this treatment. [Patient demonstrated and verbalized proper technique for using air break mask] : Patient demonstrated and verbalized proper technique for using air break mask [Patient educated on the risks of SMOKING prior to HBOT with understanding] : Patient educated on the risks of SMOKING prior to HBOT with understanding [Patient educated on the risks of CONSUMING ALCOHOL prior to HBOT with understanding] : Patient educated on the risks of CONSUMING ALCOHOL prior to HBOT with understanding [100% Cotton] : 100% cotton [Empty all pockets] : empty all pockets [No hair oils, wigs, hairpieces, pins] : no hair oils, wigs, hairpieces, pins  [Pre tx medications] : pre tx medications  [No make-up, creams] : no make-up, creams  [No jewelry] : no jewelry  [No matches, cigarettes, lighters] : no matches, cigarettes, lighters  [Hearing aid removed] : hearing aid removed [Dentures removed] : dentures removed [Ground bracelet on pt's wrist] : ground bracelet on pt's wrist  [Contacts removed] : contacts removed  [Remove nail polish] : remove nail polish  [No reading material] : no reading material  [Bra, undergarments removed] : bra, undergarments removed  [No contraindicated dressings] : no contraindicated dressings [Ground Wire - VISUAL Verification - Intact/Free of Obstruction] : Ground Wire - VISUAL Verification - Intact/Free of Obstruction  [Ground Continuity - Verified < 1 ohm w/ Wrist Strap Gatito] : Ground Continuity - Verified < 1 ohm w/ Wrist Strap Gatito [Number: ___] : Number: [unfilled] [Diagnosis: ___] : Diagnosis: [unfilled] [____] : Post-Dive: Time - [unfilled] [___] : Post-Dive: Value - [unfilled] mg/dL [Clear all fields] : clear all fields [] : No [FreeTextEntry4] : 100 min [FreeTextEntry6] : 644 [FreeTextEntry8] : 902 [de-identified] : 9:11 [de-identified] : 9:16 [de-identified] : 9:46 [de-identified] : 9:51 [de-identified] : 10:21 [de-identified] : 10:31 [de-identified] : 120 min

## 2022-08-01 NOTE — ADDENDUM
[FreeTextEntry1] : PT ARRIVED AMBULATORY A&OX4. PT ARRIVED ON THE UNIT LATER THAN SCHEDULED ARRIVAL FOR HBOT DUE TO A CONFLICT. THE PT REQUEST A LATER CHAMBER TIME FOR HBOT ON 07/27/22 AND WAS ADVISED THAT LATER CHAMBERS ARE NOT AVAILABLE.\par NO DRAINAGE NOTED ON DRESSING FOLLOWING REMOVAL OF ACE WRAP.\par BLANKET ROLL PROVIDED TO PREVENT SKIN CONTACT WITH FOOT BAR OF STRETCHER\par ALL VITALS WITHIN PARAMETERS FOR HBOT.\par PT DESCENT TO RX TX DEPTH IN CHAMBER 1 WAS WITHOUT INCIDENT.\par PT RESTING AT DEPTH, CHEST RISE AND FALL OBSERVED.\par PT TOLERATED AIR BREAKS WELL.\par PT ASCENT WAS WITHOUT INCIDENT. PT TOLERATED HBOT WELL\par ACE AND OFFLOADING BOOT RE APPLIED BY NURSE.\par PT WAS REMINDED OF WOUND ASSESSMENT/GRAFT ON 07/28/22\par \par ALEA GIRALDO 07/27/22

## 2022-08-01 NOTE — ADDENDUM
[FreeTextEntry1] : Patients Order Verified Prior to Treatment. \par Patients Secondary Contraindication Examination Check Preformed by CHT. \par Patients Contraindication List and Pre-Check List, Verified and Signed by Technician and CHT Prior to Patients Treatment. \par \par Pt descent to Rx tx depth in chamber 1 was without incident. Pt tolerated air breaks well.\par Pt ascent was without incident. Pt tolerated hbot well\par \par \par \par \par Care transferred for transcription of post vitals only.\par \par Cht HERMELINDO Woods 08/01/22\par \par \par \par

## 2022-08-01 NOTE — PROCEDURE
[] : No [FreeTextEntry4] : 100 MIN [FreeTextEntry6] : 8:23 [FreeTextEntry8] : 8:33 [de-identified] : 9:03 [de-identified] : 9:08 [de-identified] : 9:38 [de-identified] : 9:43 [de-identified] : 10:13 [de-identified] : 10:23 [de-identified] : 120 MIN

## 2022-08-02 ENCOUNTER — APPOINTMENT (OUTPATIENT)
Dept: HYPERBARIC MEDICINE | Facility: HOSPITAL | Age: 57
End: 2022-08-02

## 2022-08-02 ENCOUNTER — OUTPATIENT (OUTPATIENT)
Dept: OUTPATIENT SERVICES | Facility: HOSPITAL | Age: 57
LOS: 1 days | Discharge: HOME | End: 2022-08-02
Payer: MEDICARE

## 2022-08-02 VITALS
TEMPERATURE: 97.9 F | OXYGEN SATURATION: 95 % | HEART RATE: 63 BPM | SYSTOLIC BLOOD PRESSURE: 166 MMHG | RESPIRATION RATE: 20 BRPM | DIASTOLIC BLOOD PRESSURE: 83 MMHG

## 2022-08-02 VITALS
SYSTOLIC BLOOD PRESSURE: 152 MMHG | RESPIRATION RATE: 20 BRPM | TEMPERATURE: 97.6 F | DIASTOLIC BLOOD PRESSURE: 82 MMHG | OXYGEN SATURATION: 95 % | HEART RATE: 59 BPM

## 2022-08-02 DIAGNOSIS — Z94.0 KIDNEY TRANSPLANT STATUS: Chronic | ICD-10-CM

## 2022-08-02 DIAGNOSIS — N18.6 END STAGE RENAL DISEASE: Chronic | ICD-10-CM

## 2022-08-02 DIAGNOSIS — Z89.421 ACQUIRED ABSENCE OF OTHER RIGHT TOE(S): Chronic | ICD-10-CM

## 2022-08-02 DIAGNOSIS — E11.621 TYPE 2 DIABETES MELLITUS WITH FOOT ULCER: ICD-10-CM

## 2022-08-02 PROCEDURE — 82962 GLUCOSE BLOOD TEST: CPT

## 2022-08-02 PROCEDURE — G0277: CPT

## 2022-08-02 PROCEDURE — 99183 HYPERBARIC OXYGEN THERAPY: CPT

## 2022-08-03 ENCOUNTER — APPOINTMENT (OUTPATIENT)
Dept: HYPERBARIC MEDICINE | Facility: HOSPITAL | Age: 57
End: 2022-08-03

## 2022-08-03 ENCOUNTER — OUTPATIENT (OUTPATIENT)
Dept: OUTPATIENT SERVICES | Facility: HOSPITAL | Age: 57
LOS: 1 days | Discharge: HOME | End: 2022-08-03
Payer: MEDICARE

## 2022-08-03 VITALS
SYSTOLIC BLOOD PRESSURE: 152 MMHG | OXYGEN SATURATION: 95 % | DIASTOLIC BLOOD PRESSURE: 79 MMHG | TEMPERATURE: 98.2 F | RESPIRATION RATE: 20 BRPM | HEART RATE: 55 BPM

## 2022-08-03 VITALS
SYSTOLIC BLOOD PRESSURE: 167 MMHG | HEART RATE: 56 BPM | TEMPERATURE: 98.1 F | OXYGEN SATURATION: 95 % | DIASTOLIC BLOOD PRESSURE: 77 MMHG | RESPIRATION RATE: 18 BRPM

## 2022-08-03 DIAGNOSIS — E11.621 TYPE 2 DIABETES MELLITUS WITH FOOT ULCER: ICD-10-CM

## 2022-08-03 DIAGNOSIS — L97.412 NON-PRESSURE CHRONIC ULCER OF RIGHT HEEL AND MIDFOOT WITH FAT LAYER EXPOSED: ICD-10-CM

## 2022-08-03 DIAGNOSIS — N18.6 END STAGE RENAL DISEASE: Chronic | ICD-10-CM

## 2022-08-03 DIAGNOSIS — Z89.421 ACQUIRED ABSENCE OF OTHER RIGHT TOE(S): Chronic | ICD-10-CM

## 2022-08-03 DIAGNOSIS — Z79.4 LONG TERM (CURRENT) USE OF INSULIN: ICD-10-CM

## 2022-08-03 DIAGNOSIS — Z94.0 KIDNEY TRANSPLANT STATUS: Chronic | ICD-10-CM

## 2022-08-03 PROCEDURE — G0277: CPT

## 2022-08-03 PROCEDURE — 99183 HYPERBARIC OXYGEN THERAPY: CPT

## 2022-08-03 PROCEDURE — 82962 GLUCOSE BLOOD TEST: CPT

## 2022-08-03 NOTE — ADDENDUM
[FreeTextEntry1] : Pt descended to 2.4 DEEPAK @ 2.2 PSI/min without incident in chamber #1\par Pt resting @ depth with chest rise and fall observed throughout tx. \par Pt tolerated air breaks well. \par Pt ascended from 2.4 DEEPAK @ 2.2 PSI/min without incident. \par Pt tolerated tx well.\par

## 2022-08-03 NOTE — PROCEDURE
[Outpatient] : Outpatient [Ambulatory] : Patient is ambulatory. [THIS CHAMBER HAS BEEN CLEANED / DISINFECTED] : This chamber has been cleaned / disinfected according to local and hospital policy and procedure prior to this treatment. [Patient demonstrated and verbalized proper technique for using air break mask] : Patient demonstrated and verbalized proper technique for using air break mask [Patient educated on the risks of SMOKING prior to HBOT with understanding] : Patient educated on the risks of SMOKING prior to HBOT with understanding [Patient educated on the risks of CONSUMING ALCOHOL prior to HBOT with understanding] : Patient educated on the risks of CONSUMING ALCOHOL prior to HBOT with understanding [100% Cotton] : 100% cotton [Empty all pockets] : empty all pockets [No hair oils, wigs, hairpieces, pins] : no hair oils, wigs, hairpieces, pins  [Pre tx medications] : pre tx medications  [No make-up, creams] : no make-up, creams  [No jewelry] : no jewelry  [No matches, cigarettes, lighters] : no matches, cigarettes, lighters  [Hearing aid removed] : hearing aid removed [Dentures removed] : dentures removed [Ground bracelet on pt's wrist] : ground bracelet on pt's wrist  [Contacts removed] : contacts removed  [Remove nail polish] : remove nail polish  [No reading material] : no reading material  [Bra, undergarments removed] : bra, undergarments removed  [No contraindicated dressings] : no contraindicated dressings [Ground Wire - VISUAL Verification - Intact/Free of Obstruction] : Ground Wire - VISUAL Verification - Intact/Free of Obstruction  [Ground Continuity - Verified < 1 ohm w/ Wrist Strap Gatito] : Ground Continuity - Verified < 1 ohm w/ Wrist Strap Gatito [Number: ___] : Number: [unfilled] [Diagnosis: ___] : Diagnosis: [unfilled] [____] : Post-Dive: Time - [unfilled] [___] : Post-Dive: Value - [unfilled] mg/dL [Clear all fields] : clear all fields [] : No [FreeTextEntry4] : 100 MIN [FreeTextEntry6] : 0304 [FreeTextEntry8] : 5189 [de-identified] : 8:58 [de-identified] : 9:03 [de-identified] : 9:33 [de-identified] : 9:38 [de-identified] : 10:08 [de-identified] : 10:18 [de-identified] : 120 MIN

## 2022-08-03 NOTE — ADDENDUM
[FreeTextEntry1] : The pt. presented to [facility] ambulating with use of R foot CAM WALKER BOOT and A&Ox3 for scheduled HBOT.\par \par The pt's [L LATERAL ABD.] insulin port was covered with chamber appropriate barrier [ABD] dressing and secured to the pt. with paper tape prior to start of HBOT.\par \par The pt. was offered and declined positional measures prior to start of HBOT.\par \par The pt's R foot wound dressing was found to have dry serous strikethrough on the lateral plantar aspect ; pt. has APLIGRAF in place ; Nurse to evaluate outer dressing for change post-HBOT. \par \par The pt's pre-dive screening was found to be within acceptable limits to begin HBOT.\par "HBO-TO" secondary PDS x addt'l CHT found the pt. fit to begin HBOTx2.\par The pt. descended @ 2.2 PSI/min. to Rx'd HBOT tx. depth of 2.4 DEEPAK in chamber # 1 without incident.\par The pt. was observed with visible chest motion and without incident for the duration of observed HBOT.\par The pt. was administered intermittent med. air over course of HBOT without incident. \par Transfer of Observation from [facility] to  @ 08:55.\par Pt tolerated air breaks well.\par Pt ascent was without incident.Pt tolerated hbot well.\par \par Transfer from  to [facility] for transcription of post tx encounter.\par Ace re applied by nurse. Pt was reminded of arrival time for assessment/graft on 08/04/22.\par Pt tolerated hbot well.\par \par Cht HERMELINDO Woods 08/03/22

## 2022-08-03 NOTE — PROCEDURE
[Outpatient] : Outpatient [Ambulatory] : Patient is ambulatory. [THIS CHAMBER HAS BEEN CLEANED / DISINFECTED] : This chamber has been cleaned / disinfected according to local and hospital policy and procedure prior to this treatment. [____] : Post-Dive: Time - [unfilled] [___] : Post-Dive: Value - [unfilled] mg/dL [Patient demonstrated and verbalized proper technique for using air break mask] : Patient demonstrated and verbalized proper technique for using air break mask [Patient educated on the risks of SMOKING prior to HBOT with understanding] : Patient educated on the risks of SMOKING prior to HBOT with understanding [Patient educated on the risks of CONSUMING ALCOHOL prior to HBOT with understanding] : Patient educated on the risks of CONSUMING ALCOHOL prior to HBOT with understanding [100% Cotton] : 100% cotton [Empty all pockets] : empty all pockets [No hair oils, wigs, hairpieces, pins] : no hair oils, wigs, hairpieces, pins  [Pre tx medications] : pre tx medications  [No make-up, creams] : no make-up, creams  [No jewelry] : no jewelry  [No matches, cigarettes, lighters] : no matches, cigarettes, lighters  [Hearing aid removed] : hearing aid removed [Dentures removed] : dentures removed [Ground bracelet on pt's wrist] : ground bracelet on pt's wrist  [Contacts removed] : contacts removed  [Remove nail polish] : remove nail polish  [No reading material] : no reading material  [Bra, undergarments removed] : bra, undergarments removed  [No contraindicated dressings] : no contraindicated dressings [Ground Wire - VISUAL Verification - Intact/Free of Obstruction] : Ground Wire - VISUAL Verification - Intact/Free of Obstruction  [Ground Continuity - Verified < 1 ohm w/ Wrist Strap Gatito] : Ground Continuity - Verified < 1 ohm w/ Wrist Strap Gatito [Diagnosis: ___] : Diagnosis: [unfilled] [Number: ___] : Number: [unfilled] [Clear all fields] : clear all fields [] : No [FreeTextEntry4] : 100 MIN [FreeTextEntry6] : 9403 [FreeTextEntry8] : 3419 [de-identified] : 8894 [de-identified] : 9813 [de-identified] : 8075 [de-identified] : 4266 [de-identified] : 6168 [de-identified] : 1007 [de-identified] : 120 MIN

## 2022-08-03 NOTE — ADDENDUM
[FreeTextEntry1] : Patients Order Verified Prior to Treatment. \par Patients Secondary Contraindication Examination Check Preformed by CHT. \par Patients Contraindication List and Pre-Check List, Verified and Signed by Technician and CHT Prior to Patients Treatment. \par Patient descended to 2.4 DEEPAK @ 2.2 PSI/MIN without incident in chamber #1.\par Patient resting comfortably @ depth, equal chest rise observed throughout treatment.\par Patient tolerated both air breaks well.\par Patient ascended from 2.4 DEEPAK @ 2.2 PSI/MIN without incident in chamber #1.\par Patient tolerated treatment well.\par Patient Exited the Hyperbaric Suite Safely\par Ace applied post tx by Nurse. \par

## 2022-08-03 NOTE — PROCEDURE
[Outpatient] : Outpatient [Ambulatory] : Patient is ambulatory. [Other: ___] : [unfilled] [THIS CHAMBER HAS BEEN CLEANED / DISINFECTED] : This chamber has been cleaned / disinfected according to local and hospital policy and procedure prior to this treatment. [Patient demonstrated and verbalized proper technique for using air break mask] : Patient demonstrated and verbalized proper technique for using air break mask [Patient educated on the risks of SMOKING prior to HBOT with understanding] : Patient educated on the risks of SMOKING prior to HBOT with understanding [Patient educated on the risks of CONSUMING ALCOHOL prior to HBOT with understanding] : Patient educated on the risks of CONSUMING ALCOHOL prior to HBOT with understanding [100% Cotton] : 100% cotton [Empty all pockets] : empty all pockets [No hair oils, wigs, hairpieces, pins] : no hair oils, wigs, hairpieces, pins  [Pre tx medications] : pre tx medications  [No make-up, creams] : no make-up, creams  [No jewelry] : no jewelry  [No matches, cigarettes, lighters] : no matches, cigarettes, lighters  [Hearing aid removed] : hearing aid removed [Dentures removed] : dentures removed [Ground bracelet on pt's wrist] : ground bracelet on pt's wrist  [Contacts removed] : contacts removed  [Remove nail polish] : remove nail polish  [No reading material] : no reading material  [Bra, undergarments removed] : bra, undergarments removed  [No contraindicated dressings] : no contraindicated dressings [Ground Wire - VISUAL Verification - Intact/Free of Obstruction] : Ground Wire - VISUAL Verification - Intact/Free of Obstruction  [Ground Continuity - Verified < 1 ohm w/ Wrist Strap Gatito] : Ground Continuity - Verified < 1 ohm w/ Wrist Strap Gatito [Number: ___] : Number: [unfilled] [Diagnosis: ___] : Diagnosis: [unfilled] [____] : Post-Dive: Time - [unfilled] [___] : Post-Dive: Value - [unfilled] mg/dL [Clear all fields] : clear all fields [] : No [FreeTextEntry2] : MD Rx Verified [Hard Copy] [FreeTextEntry4] : 100 min [FreeTextEntry6] : 8 : 22 [FreeTextEntry8] : 8 : 32 [de-identified] : 9:02 [de-identified] : 9:07 [de-identified] : 9:37 [de-identified] : 9:42 [de-identified] : 10:12 [de-identified] : 10:22 [de-identified] : 120 min

## 2022-08-03 NOTE — ADDENDUM
[FreeTextEntry1] : PT ARRIVED A&OX3 \par ALL VITALS WITHIN PARAMETERS FOR HBOT\par NO DRAINAGE NOTED ON DRESSING PRE HBOT \par PT PRE-DIVE CHECKLIST SIGNED AND WITNESSED BY CHT\par PT DESCENDED TO 2.4 DEEPAK @ 2.2 PSI/MIN IN CHAMBER #1  WITHOUT INCIDENT\par PT RESTING AT TX DEPTH WITH VISIBLE CHEST RISE AND FALL OBSERVED CHAMBER SIDE.\par PT TOLERATED AIR BREAKS WELL\par \par TRANSFER OF CARE DURING ASCENT\par PT ASCENT WAS WITHOUT INCIDENT. PT TOLERATED HBOT WELL.\par ACE BANDAGE RE APPLIED BY NURSE\par \par CHT HERMELINDO GIRALDO 07/29/22\par

## 2022-08-03 NOTE — PROCEDURE
[Outpatient] : Outpatient [Ambulatory] : Patient is ambulatory. [THIS CHAMBER HAS BEEN CLEANED / DISINFECTED] : This chamber has been cleaned / disinfected according to local and hospital policy and procedure prior to this treatment. [Patient demonstrated and verbalized proper technique for using air break mask] : Patient demonstrated and verbalized proper technique for using air break mask [Patient educated on the risks of SMOKING prior to HBOT with understanding] : Patient educated on the risks of SMOKING prior to HBOT with understanding [Patient educated on the risks of CONSUMING ALCOHOL prior to HBOT with understanding] : Patient educated on the risks of CONSUMING ALCOHOL prior to HBOT with understanding [100% Cotton] : 100% cotton [Empty all pockets] : empty all pockets [No hair oils, wigs, hairpieces, pins] : no hair oils, wigs, hairpieces, pins  [Pre tx medications] : pre tx medications  [No make-up, creams] : no make-up, creams  [No jewelry] : no jewelry  [No matches, cigarettes, lighters] : no matches, cigarettes, lighters  [Hearing aid removed] : hearing aid removed [Dentures removed] : dentures removed [Ground bracelet on pt's wrist] : ground bracelet on pt's wrist  [Contacts removed] : contacts removed  [Remove nail polish] : remove nail polish  [No reading material] : no reading material  [Bra, undergarments removed] : bra, undergarments removed  [No contraindicated dressings] : no contraindicated dressings [Ground Wire - VISUAL Verification - Intact/Free of Obstruction] : Ground Wire - VISUAL Verification - Intact/Free of Obstruction  [Ground Continuity - Verified < 1 ohm w/ Wrist Strap Gatito] : Ground Continuity - Verified < 1 ohm w/ Wrist Strap Gatito [Number: ___] : Number: [unfilled] [Diagnosis: ___] : Diagnosis: [unfilled] [____] : Post-Dive: Time - [unfilled] [___] : Post-Dive: Value - [unfilled] mg/dL [Clear all fields] : clear all fields [] : No [FreeTextEntry4] : 100 [FreeTextEntry6] : 9764 [FreeTextEntry8] : 1433 [de-identified] : 4103 [de-identified] : 1209 [de-identified] : 09 [de-identified] : 2941 [de-identified] : 1010 [de-identified] : 1024 [de-identified] : 120mins

## 2022-08-04 ENCOUNTER — APPOINTMENT (OUTPATIENT)
Dept: HYPERBARIC MEDICINE | Facility: HOSPITAL | Age: 57
End: 2022-08-04

## 2022-08-04 ENCOUNTER — OUTPATIENT (OUTPATIENT)
Dept: OUTPATIENT SERVICES | Facility: HOSPITAL | Age: 57
LOS: 1 days | Discharge: HOME | End: 2022-08-04
Payer: MEDICARE

## 2022-08-04 VITALS
HEIGHT: 73 IN | DIASTOLIC BLOOD PRESSURE: 80 MMHG | OXYGEN SATURATION: 95 % | BODY MASS INDEX: 41.75 KG/M2 | TEMPERATURE: 97.6 F | SYSTOLIC BLOOD PRESSURE: 153 MMHG | HEART RATE: 55 BPM | WEIGHT: 315 LBS | RESPIRATION RATE: 20 BRPM

## 2022-08-04 DIAGNOSIS — N18.6 END STAGE RENAL DISEASE: Chronic | ICD-10-CM

## 2022-08-04 DIAGNOSIS — Z94.0 KIDNEY TRANSPLANT STATUS: Chronic | ICD-10-CM

## 2022-08-04 DIAGNOSIS — Z89.421 ACQUIRED ABSENCE OF OTHER RIGHT TOE(S): Chronic | ICD-10-CM

## 2022-08-04 DIAGNOSIS — E11.621 TYPE 2 DIABETES MELLITUS WITH FOOT ULCER: ICD-10-CM

## 2022-08-04 PROCEDURE — 15275 SKIN SUB GRAFT FACE/NK/HF/G: CPT

## 2022-08-04 PROCEDURE — 15275 SKIN SUB GRAFT FACE/NK/HF/G: CPT | Mod: 58

## 2022-08-04 NOTE — ASSESSMENT
[Verbal] : Verbal [Written] : Written [Demo] : Demo [Patient] : Patient [Good - alert, interested, motivated] : Good - alert, interested, motivated [Demonstrates independently] : demonstrates independently [Dressing changes] : dressing changes [Foot Care] : foot care [Skin Care] : skin care [Pressure relief] : pressure relief [Signs and symptoms of infection] : sign and symptoms of infection [How and When to Call] : how and when to call [Hyperbaric Therapy] : hyperbaric therapy [Off-loading] : off-loading [Compression Therapy] : compression therapy [Patient responsibility to plan of care] : patient responsibility to plan of care [Glycemic Control] : glycemic control [Stable] : stable [Home] : Home [Ambulatory] : Ambulatory [] : No [FreeTextEntry2] : Apligraf Application\par Offloading/Pressure Relief  \par Infection Prevention \par Compression Therapy \par Offloading/Pressure Relief \par Encourage Glucose Control\par HBOT\par Apligraf\par  F/U  daily for HBOT and in 1 week for assessment. \par  [FreeTextEntry4] : Patient has completed 31/40 HBOT.\par DPM applied 2nd application of Apligraf.  DPM ordered 3rd Apligraf.\par  Authorization for 3rd Apligraf submitted today.\par  F/U  daily for HBOT and in 1 week for assessment.

## 2022-08-04 NOTE — PROCEDURE
[Saline] : saline [Scalpel] : scalpel [Skin] : skin [Subcutaneous Tissue] : subcutaneous tissue [Fenestrated] : fenestrated [Hydrated with saline] : hydrated with saline [Apligraf] : apligraf [____ % was used] : and [unfilled] % was used [____ % was discarded] : and [unfilled] % was discarded [FreeTextEntry1] : adaptic, calcium alginate, dry dressing [FreeTextEntry9] : 9:58am [de-identified] : right foot plantar ulcer 5th metatarsal [de-identified] : carleen [de-identified] : oconnell [FreeTextEntry6] : right foot plantar ulcer 5th metatarsal [FreeTextEntry7] : right foot plantar ulcer 5th metatarsal [de-identified] : skin, subcutaneous tissue, fat

## 2022-08-04 NOTE — REVIEW OF SYSTEMS
[FreeTextEntry1] : 9:38am [Fever] : no fever [Chills] : no chills [Eye Pain] : no eye pain [Loss Of Hearing] : no hearing loss [Shortness Of Breath] : no shortness of breath [Abdominal Pain] : no abdominal pain [Vomiting] : no vomiting [Skin Lesions] : no skin lesions [Skin Wound] : no skin wound [Anxiety] : no anxiety [Easy Bleeding] : no tendency for easy bleeding [FreeTextEntry5] : HTN,HLD [FreeTextEntry7] : 40.9 BMI , morbid obesity  [FreeTextEntry8] : Kidney Transplant  [FreeTextEntry9] : Associated Diabetic Charcot foot  [de-identified] : plantar right foot midfoot , lateral  DFU 2  , large area of peripheral callus build up , the ulcer is clean and granular s/p surgical intervention , sutures intact - soi  [de-identified] : IDDM with neuropathy  [de-identified] : MARILUZ

## 2022-08-04 NOTE — PHYSICAL EXAM
[4 x 4] : 4 x 4  [Abdominal Pad] : Abdominal Pad [0] : left 0 [1+] : left 1+ [Varicose Veins Of Lower Extremities] : not present [] : not present [Purpura] : no purpura  [Petechiae] : no petechiae [Skin Ulcer] : ulcer [Skin Induration] : no induration [Alert] : alert [Oriented to Person] : oriented to person [Oriented to Place] : oriented to place [Oriented to Time] : oriented to time [Calm] : calm [de-identified] : adult WM, NAD, alert, Ox 3. [de-identified] : HTN, HLD [de-identified] : Diabetic Charcot right foot deformity [de-identified] : right foot plantar ulcer 5th metatarsal healing , head of 5th metatarsal healed well [de-identified] : Diabetic neuropathy  [de-identified] : \par  [FreeTextEntry7] :  Plantar Foot  [FreeTextEntry8] : 1.6 [FreeTextEntry9] : 2.1 [de-identified] : 0.2 [de-identified] : Serosanguineous [de-identified] : Callus- Removed [de-identified] : Apligraf [de-identified] : Apligraf, Adaptic Touch & Calcium Alginate  [de-identified] : Mechanically Cleansed with Sterile Gauze & Normal Saline \par \par Post debridement measurements:\par 1.7 x 2.2 x 0.2\par \par 1 Unit of Apligraf 44(SqCm)\par Lot#- [id number]\par PO#- [id number]\par Expiration- 08/06/2022\par pH- 7.3-7.5\par \par Reconstituted with Normal Saline \par Lot#- [id number]\par Exp:- 09/01/2024\par \par Percentage Implanted- 25%\par Percentage Discharged- 75%  [de-identified] :  \par  [TWNoteComboBox1] : Right [TWNoteComboBox4] : None [de-identified] : None [de-identified] : Ace wraps [TWNoteComboBox9] : Right [de-identified] : Moderate [de-identified] : Diabetic [de-identified] : other [de-identified] : None [de-identified] : None [de-identified] : 100% [TWNoteComboBox8] : Luzmaria [de-identified] : Application of skin substitute [de-identified] : Ace wraps [de-identified] : Weekly [de-identified] : Primary Dressing [de-identified] : Right [de-identified] : None [de-identified] : Ace wraps

## 2022-08-04 NOTE — VITALS
[Pain related to present condition?] : The patient's  pain is not related to present condition. [] : No [de-identified] : 0/10

## 2022-08-05 ENCOUNTER — OUTPATIENT (OUTPATIENT)
Dept: OUTPATIENT SERVICES | Facility: HOSPITAL | Age: 57
LOS: 1 days | Discharge: HOME | End: 2022-08-05
Payer: MEDICARE

## 2022-08-05 ENCOUNTER — APPOINTMENT (OUTPATIENT)
Dept: HYPERBARIC MEDICINE | Facility: HOSPITAL | Age: 57
End: 2022-08-05

## 2022-08-05 VITALS
RESPIRATION RATE: 18 BRPM | HEART RATE: 54 BPM | DIASTOLIC BLOOD PRESSURE: 88 MMHG | TEMPERATURE: 98.5 F | SYSTOLIC BLOOD PRESSURE: 174 MMHG | OXYGEN SATURATION: 97 %

## 2022-08-05 VITALS
DIASTOLIC BLOOD PRESSURE: 72 MMHG | TEMPERATURE: 98.8 F | SYSTOLIC BLOOD PRESSURE: 165 MMHG | HEART RATE: 60 BPM | RESPIRATION RATE: 18 BRPM | OXYGEN SATURATION: 99 %

## 2022-08-05 DIAGNOSIS — Z79.4 LONG TERM (CURRENT) USE OF INSULIN: ICD-10-CM

## 2022-08-05 DIAGNOSIS — Z87.81 PERSONAL HISTORY OF (HEALED) TRAUMATIC FRACTURE: ICD-10-CM

## 2022-08-05 DIAGNOSIS — E11.621 TYPE 2 DIABETES MELLITUS WITH FOOT ULCER: ICD-10-CM

## 2022-08-05 DIAGNOSIS — Z86.73 PERSONAL HISTORY OF TRANSIENT ISCHEMIC ATTACK (TIA), AND CEREBRAL INFARCTION WITHOUT RESIDUAL DEFICITS: ICD-10-CM

## 2022-08-05 DIAGNOSIS — L97.412 NON-PRESSURE CHRONIC ULCER OF RIGHT HEEL AND MIDFOOT WITH FAT LAYER EXPOSED: ICD-10-CM

## 2022-08-05 DIAGNOSIS — Z94.0 KIDNEY TRANSPLANT STATUS: Chronic | ICD-10-CM

## 2022-08-05 DIAGNOSIS — Z89.421 ACQUIRED ABSENCE OF OTHER RIGHT TOE(S): Chronic | ICD-10-CM

## 2022-08-05 DIAGNOSIS — Z89.421 ACQUIRED ABSENCE OF OTHER RIGHT TOE(S): ICD-10-CM

## 2022-08-05 DIAGNOSIS — Z94.0 KIDNEY TRANSPLANT STATUS: ICD-10-CM

## 2022-08-05 DIAGNOSIS — Z79.82 LONG TERM (CURRENT) USE OF ASPIRIN: ICD-10-CM

## 2022-08-05 DIAGNOSIS — Z85.828 PERSONAL HISTORY OF OTHER MALIGNANT NEOPLASM OF SKIN: ICD-10-CM

## 2022-08-05 DIAGNOSIS — Z86.718 PERSONAL HISTORY OF OTHER VENOUS THROMBOSIS AND EMBOLISM: ICD-10-CM

## 2022-08-05 DIAGNOSIS — L84 CORNS AND CALLOSITIES: ICD-10-CM

## 2022-08-05 DIAGNOSIS — Z89.422 ACQUIRED ABSENCE OF OTHER LEFT TOE(S): ICD-10-CM

## 2022-08-05 DIAGNOSIS — Z79.899 OTHER LONG TERM (CURRENT) DRUG THERAPY: ICD-10-CM

## 2022-08-05 DIAGNOSIS — E11.51 TYPE 2 DIABETES MELLITUS WITH DIABETIC PERIPHERAL ANGIOPATHY WITHOUT GANGRENE: ICD-10-CM

## 2022-08-05 DIAGNOSIS — E78.5 HYPERLIPIDEMIA, UNSPECIFIED: ICD-10-CM

## 2022-08-05 DIAGNOSIS — Z86.16 PERSONAL HISTORY OF COVID-19: ICD-10-CM

## 2022-08-05 DIAGNOSIS — E11.610 TYPE 2 DIABETES MELLITUS WITH DIABETIC NEUROPATHIC ARTHROPATHY: ICD-10-CM

## 2022-08-05 DIAGNOSIS — N18.6 END STAGE RENAL DISEASE: Chronic | ICD-10-CM

## 2022-08-05 DIAGNOSIS — G47.33 OBSTRUCTIVE SLEEP APNEA (ADULT) (PEDIATRIC): ICD-10-CM

## 2022-08-05 LAB — SARS-COV-2 RNA SPEC QL NAA+PROBE: SIGNIFICANT CHANGE UP

## 2022-08-05 PROCEDURE — U0003: CPT

## 2022-08-05 PROCEDURE — 99183 HYPERBARIC OXYGEN THERAPY: CPT

## 2022-08-05 PROCEDURE — G0277: CPT

## 2022-08-05 PROCEDURE — 82962 GLUCOSE BLOOD TEST: CPT

## 2022-08-05 PROCEDURE — U0005: CPT

## 2022-08-05 NOTE — PROCEDURE
[Outpatient] : Outpatient [Ambulatory] : Patient is ambulatory. [THIS CHAMBER HAS BEEN CLEANED / DISINFECTED] : This chamber has been cleaned / disinfected according to local and hospital policy and procedure prior to this treatment. [Patient demonstrated and verbalized proper technique for using air break mask] : Patient demonstrated and verbalized proper technique for using air break mask [Patient educated on the risks of SMOKING prior to HBOT with understanding] : Patient educated on the risks of SMOKING prior to HBOT with understanding [Patient educated on the risks of CONSUMING ALCOHOL prior to HBOT with understanding] : Patient educated on the risks of CONSUMING ALCOHOL prior to HBOT with understanding [100% Cotton] : 100% cotton [Empty all pockets] : empty all pockets [No hair oils, wigs, hairpieces, pins] : no hair oils, wigs, hairpieces, pins  [Pre tx medications] : pre tx medications  [No make-up, creams] : no make-up, creams  [No jewelry] : no jewelry  [No matches, cigarettes, lighters] : no matches, cigarettes, lighters  [Hearing aid removed] : hearing aid removed [Dentures removed] : dentures removed [Ground bracelet on pt's wrist] : ground bracelet on pt's wrist  [Contacts removed] : contacts removed  [Remove nail polish] : remove nail polish  [No reading material] : no reading material  [Bra, undergarments removed] : bra, undergarments removed  [No contraindicated dressings] : no contraindicated dressings [Ground Wire - VISUAL Verification - Intact/Free of Obstruction] : Ground Wire - VISUAL Verification - Intact/Free of Obstruction  [Ground Continuity - Verified < 1 ohm w/ Wrist Strap Gatito] : Ground Continuity - Verified < 1 ohm w/ Wrist Strap Gatito [Number: ___] : Number: [unfilled] [Diagnosis: ___] : Diagnosis: [unfilled] [____] : Post-Dive: Time - [unfilled] [___] : Post-Dive: Value - [unfilled] mg/dL [Clear all fields] : clear all fields [] : No [FreeTextEntry4] : 100 [FreeTextEntry6] : 7943 [FreeTextEntry8] : 0832 [de-identified] : 6680 [de-identified] : 0960 [de-identified] : 0912 [de-identified] : 0961 [de-identified] : 1018 [de-identified] : 0545 [de-identified] : 120 MINUTES

## 2022-08-08 ENCOUNTER — OUTPATIENT (OUTPATIENT)
Dept: OUTPATIENT SERVICES | Facility: HOSPITAL | Age: 57
LOS: 1 days | Discharge: HOME | End: 2022-08-08
Payer: MEDICARE

## 2022-08-08 ENCOUNTER — APPOINTMENT (OUTPATIENT)
Dept: HYPERBARIC MEDICINE | Facility: HOSPITAL | Age: 57
End: 2022-08-08

## 2022-08-08 VITALS
HEART RATE: 68 BPM | TEMPERATURE: 97.7 F | DIASTOLIC BLOOD PRESSURE: 80 MMHG | OXYGEN SATURATION: 95 % | RESPIRATION RATE: 20 BRPM | SYSTOLIC BLOOD PRESSURE: 165 MMHG

## 2022-08-08 VITALS
TEMPERATURE: 97.5 F | RESPIRATION RATE: 20 BRPM | OXYGEN SATURATION: 100 % | SYSTOLIC BLOOD PRESSURE: 165 MMHG | DIASTOLIC BLOOD PRESSURE: 86 MMHG | HEART RATE: 57 BPM

## 2022-08-08 DIAGNOSIS — Z94.0 KIDNEY TRANSPLANT STATUS: Chronic | ICD-10-CM

## 2022-08-08 DIAGNOSIS — E11.621 TYPE 2 DIABETES MELLITUS WITH FOOT ULCER: ICD-10-CM

## 2022-08-08 DIAGNOSIS — N18.6 END STAGE RENAL DISEASE: Chronic | ICD-10-CM

## 2022-08-08 DIAGNOSIS — Z89.421 ACQUIRED ABSENCE OF OTHER RIGHT TOE(S): Chronic | ICD-10-CM

## 2022-08-08 DIAGNOSIS — L97.412 NON-PRESSURE CHRONIC ULCER OF RIGHT HEEL AND MIDFOOT WITH FAT LAYER EXPOSED: ICD-10-CM

## 2022-08-08 PROCEDURE — 99183 HYPERBARIC OXYGEN THERAPY: CPT

## 2022-08-08 PROCEDURE — G0277: CPT

## 2022-08-08 PROCEDURE — 82962 GLUCOSE BLOOD TEST: CPT

## 2022-08-08 NOTE — PROCEDURE
[Outpatient] : Outpatient [Ambulatory] : Patient is ambulatory. [THIS CHAMBER HAS BEEN CLEANED / DISINFECTED] : This chamber has been cleaned / disinfected according to local and hospital policy and procedure prior to this treatment. [Patient demonstrated and verbalized proper technique for using air break mask] : Patient demonstrated and verbalized proper technique for using air break mask [Patient educated on the risks of SMOKING prior to HBOT with understanding] : Patient educated on the risks of SMOKING prior to HBOT with understanding [Patient educated on the risks of CONSUMING ALCOHOL prior to HBOT with understanding] : Patient educated on the risks of CONSUMING ALCOHOL prior to HBOT with understanding [100% Cotton] : 100% cotton [Empty all pockets] : empty all pockets [No hair oils, wigs, hairpieces, pins] : no hair oils, wigs, hairpieces, pins  [Pre tx medications] : pre tx medications  [No make-up, creams] : no make-up, creams  [No jewelry] : no jewelry  [No matches, cigarettes, lighters] : no matches, cigarettes, lighters  [Hearing aid removed] : hearing aid removed [Dentures removed] : dentures removed [Ground bracelet on pt's wrist] : ground bracelet on pt's wrist  [Contacts removed] : contacts removed  [Remove nail polish] : remove nail polish  [No reading material] : no reading material  [Bra, undergarments removed] : bra, undergarments removed  [No contraindicated dressings] : no contraindicated dressings [Ground Wire - VISUAL Verification - Intact/Free of Obstruction] : Ground Wire - VISUAL Verification - Intact/Free of Obstruction  [Ground Continuity - Verified < 1 ohm w/ Wrist Strap Gatito] : Ground Continuity - Verified < 1 ohm w/ Wrist Strap Gatito [Number: ___] : Number: [unfilled] [Diagnosis: ___] : Diagnosis: [unfilled] [____] : Post-Dive: Time - [unfilled] [___] : Post-Dive: Value - [unfilled] mg/dL [Clear all fields] : clear all fields [] : No [FreeTextEntry4] : 100 [FreeTextEntry6] : 5779 [FreeTextEntry8] : 1688 [de-identified] : 8478 [de-identified] : 5245 [de-identified] : 3579 [de-identified] : 9996 [de-identified] : 1022 [de-identified] : 1038 [de-identified] : 120mins

## 2022-08-09 ENCOUNTER — APPOINTMENT (OUTPATIENT)
Dept: HYPERBARIC MEDICINE | Facility: HOSPITAL | Age: 57
End: 2022-08-09

## 2022-08-09 ENCOUNTER — OUTPATIENT (OUTPATIENT)
Dept: OUTPATIENT SERVICES | Facility: HOSPITAL | Age: 57
LOS: 1 days | Discharge: HOME | End: 2022-08-09

## 2022-08-09 VITALS
DIASTOLIC BLOOD PRESSURE: 79 MMHG | HEART RATE: 64 BPM | RESPIRATION RATE: 20 BRPM | OXYGEN SATURATION: 100 % | SYSTOLIC BLOOD PRESSURE: 152 MMHG | TEMPERATURE: 97.7 F

## 2022-08-09 VITALS
DIASTOLIC BLOOD PRESSURE: 69 MMHG | OXYGEN SATURATION: 95 % | HEART RATE: 65 BPM | TEMPERATURE: 97.7 F | RESPIRATION RATE: 20 BRPM | SYSTOLIC BLOOD PRESSURE: 164 MMHG

## 2022-08-09 DIAGNOSIS — E11.621 TYPE 2 DIABETES MELLITUS WITH FOOT ULCER: ICD-10-CM

## 2022-08-09 DIAGNOSIS — Z89.421 ACQUIRED ABSENCE OF OTHER RIGHT TOE(S): Chronic | ICD-10-CM

## 2022-08-09 DIAGNOSIS — L97.412 NON-PRESSURE CHRONIC ULCER OF RIGHT HEEL AND MIDFOOT WITH FAT LAYER EXPOSED: ICD-10-CM

## 2022-08-09 DIAGNOSIS — Z79.4 LONG TERM (CURRENT) USE OF INSULIN: ICD-10-CM

## 2022-08-09 DIAGNOSIS — Z94.0 KIDNEY TRANSPLANT STATUS: Chronic | ICD-10-CM

## 2022-08-09 DIAGNOSIS — N18.6 END STAGE RENAL DISEASE: Chronic | ICD-10-CM

## 2022-08-09 PROCEDURE — 99183 HYPERBARIC OXYGEN THERAPY: CPT

## 2022-08-09 NOTE — PROCEDURE
[Outpatient] : Outpatient [Ambulatory] : Patient is ambulatory. [THIS CHAMBER HAS BEEN CLEANED / DISINFECTED] : This chamber has been cleaned / disinfected according to local and hospital policy and procedure prior to this treatment. [Patient demonstrated and verbalized proper technique for using air break mask] : Patient demonstrated and verbalized proper technique for using air break mask [Patient educated on the risks of SMOKING prior to HBOT with understanding] : Patient educated on the risks of SMOKING prior to HBOT with understanding [Patient educated on the risks of CONSUMING ALCOHOL prior to HBOT with understanding] : Patient educated on the risks of CONSUMING ALCOHOL prior to HBOT with understanding [100% Cotton] : 100% cotton [Empty all pockets] : empty all pockets [No hair oils, wigs, hairpieces, pins] : no hair oils, wigs, hairpieces, pins  [Pre tx medications] : pre tx medications  [No make-up, creams] : no make-up, creams  [No jewelry] : no jewelry  [No matches, cigarettes, lighters] : no matches, cigarettes, lighters  [Hearing aid removed] : hearing aid removed [Dentures removed] : dentures removed [Ground bracelet on pt's wrist] : ground bracelet on pt's wrist  [Contacts removed] : contacts removed  [Remove nail polish] : remove nail polish  [No reading material] : no reading material  [Bra, undergarments removed] : bra, undergarments removed  [No contraindicated dressings] : no contraindicated dressings [Ground Wire - VISUAL Verification - Intact/Free of Obstruction] : Ground Wire - VISUAL Verification - Intact/Free of Obstruction  [Ground Continuity - Verified < 1 ohm w/ Wrist Strap Gatito] : Ground Continuity - Verified < 1 ohm w/ Wrist Strap Gatito [Number: ___] : Number: [unfilled] [Diagnosis: ___] : Diagnosis: [unfilled] [Clear all fields] : clear all fields [____] : Post-Dive: Time - [unfilled] [___] : Post-Dive: Value - [unfilled] mg/dL [] : No [FreeTextEntry4] : 100 [FreeTextEntry6] : 0850 [FreeTextEntry8] : 3060 [de-identified] : 0920 [de-identified] : 0948 [de-identified] : 0909 [de-identified] : 2050 [de-identified] : 1014 [de-identified] : 1027 [de-identified] : 120mins

## 2022-08-09 NOTE — ADDENDUM
[FreeTextEntry1] : Patients Order Verified Prior to Treatment. \par Patients Secondary Contraindication Examination Check Preformed by CHT. \par Patients Contraindication List and Pre-Check List, Verified and Signed by Technician and CHT Prior to Patients Treatment. \par \par Patient descended to 2.4 DEEPAK @ 2.2 PSI/MIN without incident in chamber #2.\par Patient resting comfortably @ depth, equal chest rise observed throughout treatment.\par Patient tolerated both air breaks well.\par Patient ascended from 2.4 DEEPAK @ 2.2 PSI/MIN without incident in chamber #2.\par Patient tolerated treatment well.\par Patient Exited the Hyperbaric Suite Safely\par \par ace appiled post tx.

## 2022-08-10 ENCOUNTER — OUTPATIENT (OUTPATIENT)
Dept: OUTPATIENT SERVICES | Facility: HOSPITAL | Age: 57
LOS: 1 days | Discharge: HOME | End: 2022-08-10

## 2022-08-10 ENCOUNTER — NON-APPOINTMENT (OUTPATIENT)
Age: 57
End: 2022-08-10

## 2022-08-10 ENCOUNTER — APPOINTMENT (OUTPATIENT)
Dept: HYPERBARIC MEDICINE | Facility: HOSPITAL | Age: 57
End: 2022-08-10

## 2022-08-10 VITALS
RESPIRATION RATE: 20 BRPM | OXYGEN SATURATION: 95 % | SYSTOLIC BLOOD PRESSURE: 167 MMHG | DIASTOLIC BLOOD PRESSURE: 78 MMHG | TEMPERATURE: 98.7 F | HEART RATE: 78 BPM

## 2022-08-10 VITALS
OXYGEN SATURATION: 96 % | RESPIRATION RATE: 20 BRPM | DIASTOLIC BLOOD PRESSURE: 80 MMHG | SYSTOLIC BLOOD PRESSURE: 173 MMHG | HEART RATE: 82 BPM | TEMPERATURE: 98.1 F

## 2022-08-10 DIAGNOSIS — E11.621 TYPE 2 DIABETES MELLITUS WITH FOOT ULCER: ICD-10-CM

## 2022-08-10 DIAGNOSIS — N18.6 END STAGE RENAL DISEASE: Chronic | ICD-10-CM

## 2022-08-10 DIAGNOSIS — Z94.0 KIDNEY TRANSPLANT STATUS: Chronic | ICD-10-CM

## 2022-08-10 DIAGNOSIS — Z89.421 ACQUIRED ABSENCE OF OTHER RIGHT TOE(S): Chronic | ICD-10-CM

## 2022-08-10 PROCEDURE — 99183 HYPERBARIC OXYGEN THERAPY: CPT

## 2022-08-11 ENCOUNTER — APPOINTMENT (OUTPATIENT)
Dept: HYPERBARIC MEDICINE | Facility: HOSPITAL | Age: 57
End: 2022-08-11

## 2022-08-11 ENCOUNTER — OUTPATIENT (OUTPATIENT)
Dept: OUTPATIENT SERVICES | Facility: HOSPITAL | Age: 57
LOS: 1 days | Discharge: HOME | End: 2022-08-11

## 2022-08-11 VITALS
HEART RATE: 78 BPM | WEIGHT: 315 LBS | DIASTOLIC BLOOD PRESSURE: 78 MMHG | HEIGHT: 73 IN | BODY MASS INDEX: 41.75 KG/M2 | OXYGEN SATURATION: 93 % | SYSTOLIC BLOOD PRESSURE: 196 MMHG | TEMPERATURE: 98 F | RESPIRATION RATE: 20 BRPM

## 2022-08-11 DIAGNOSIS — L97.412 NON-PRESSURE CHRONIC ULCER OF RIGHT HEEL AND MIDFOOT WITH FAT LAYER EXPOSED: ICD-10-CM

## 2022-08-11 DIAGNOSIS — E11.621 TYPE 2 DIABETES MELLITUS WITH FOOT ULCER: ICD-10-CM

## 2022-08-11 DIAGNOSIS — Z94.0 KIDNEY TRANSPLANT STATUS: Chronic | ICD-10-CM

## 2022-08-11 DIAGNOSIS — Z79.4 LONG TERM (CURRENT) USE OF INSULIN: ICD-10-CM

## 2022-08-11 DIAGNOSIS — Z89.421 ACQUIRED ABSENCE OF OTHER RIGHT TOE(S): Chronic | ICD-10-CM

## 2022-08-11 DIAGNOSIS — N18.6 END STAGE RENAL DISEASE: Chronic | ICD-10-CM

## 2022-08-11 PROCEDURE — 99213 OFFICE O/P EST LOW 20 MIN: CPT

## 2022-08-11 NOTE — PHYSICAL EXAM
[Abdominal Pad] : Abdominal Pad [4 x 4] : 4 x 4  [0] : left 0 [1+] : left 1+ [Skin Ulcer] : ulcer [Alert] : alert [Oriented to Person] : oriented to person [Oriented to Place] : oriented to place [Oriented to Time] : oriented to time [Calm] : calm [Varicose Veins Of Lower Extremities] : not present [] : not present [Purpura] : no purpura  [Petechiae] : no petechiae [Skin Induration] : no induration [de-identified] : adult WM, NAD, alert, Ox 3. [de-identified] : HTN, HLD [de-identified] : Diabetic Charcot right foot deformity [de-identified] : right foot plantar ulcer 5th metatarsal healing , head of 5th metatarsal reopened, both ulcers down to skin, subcutaneous tissue, and fat [de-identified] : Diabetic neuropathy  [de-identified] : Patient expressed comfort post compression application [FreeTextEntry1] : Lateral Foot 5th Met- NEW [FreeTextEntry2] : 0.4 [FreeTextEntry3] : 0.4 [FreeTextEntry4] : 0.3 [de-identified] : serosanguineous [de-identified] : 0.4cm @12-8 0'clock [de-identified] : mildly with callus [de-identified] : No neurovascular deficits noted  [de-identified] : Nai [de-identified] : Mechanically cleansed with 0.9%normal saline and sterile gauze\par \par Callus shaved by PATSYM [de-identified] : Patient expressed comfort post compression application\par  [FreeTextEntry7] :  Plantar Foot  [FreeTextEntry8] : 1.4 [FreeTextEntry9] : 2.1 [de-identified] : 0.2 [de-identified] : Serosanguineous [de-identified] : Callus- Removed [de-identified] : No neurovascular deficits noted  [de-identified] : Nai [de-identified] : Mechanically Cleansed with Sterile Gauze & Normal Saline \par \par Callus shaved by DPM\par \par  [de-identified] :  \par  [TWNoteComboBox1] : Right [TWNoteComboBox4] : Small [TWNoteComboBox5] : No [TWNoteComboBox6] : Other [de-identified] : Yes [de-identified] : Macerated [de-identified] : None [de-identified] : None [de-identified] : 100% [de-identified] : No [de-identified] : Ace wraps [de-identified] : 3x Weekly [de-identified] : Secondary Dressing [TWNoteComboBox9] : Right [de-identified] : Moderate [de-identified] : Diabetic [de-identified] : other [de-identified] : None [de-identified] : None [de-identified] : 100% [TWNoteComboBox8] : Luzmaria [de-identified] : Application of skin substitute [de-identified] : Ace wraps [de-identified] : 3x Weekly [de-identified] : Primary Dressing

## 2022-08-11 NOTE — PROCEDURE
[Outpatient] : Outpatient [Ambulatory] : Patient is ambulatory. [THIS CHAMBER HAS BEEN CLEANED / DISINFECTED] : This chamber has been cleaned / disinfected according to local and hospital policy and procedure prior to this treatment. [Patient demonstrated and verbalized proper technique for using air break mask] : Patient demonstrated and verbalized proper technique for using air break mask [Patient educated on the risks of SMOKING prior to HBOT with understanding] : Patient educated on the risks of SMOKING prior to HBOT with understanding [Patient educated on the risks of CONSUMING ALCOHOL prior to HBOT with understanding] : Patient educated on the risks of CONSUMING ALCOHOL prior to HBOT with understanding [100% Cotton] : 100% cotton [Empty all pockets] : empty all pockets [No hair oils, wigs, hairpieces, pins] : no hair oils, wigs, hairpieces, pins  [Pre tx medications] : pre tx medications  [No make-up, creams] : no make-up, creams  [No jewelry] : no jewelry  [No matches, cigarettes, lighters] : no matches, cigarettes, lighters  [Hearing aid removed] : hearing aid removed [Dentures removed] : dentures removed [Ground bracelet on pt's wrist] : ground bracelet on pt's wrist  [Contacts removed] : contacts removed  [Remove nail polish] : remove nail polish  [No reading material] : no reading material  [Bra, undergarments removed] : bra, undergarments removed  [No contraindicated dressings] : no contraindicated dressings [Ground Wire - VISUAL Verification - Intact/Free of Obstruction] : Ground Wire - VISUAL Verification - Intact/Free of Obstruction  [Ground Continuity - Verified < 1 ohm w/ Wrist Strap Gatito] : Ground Continuity - Verified < 1 ohm w/ Wrist Strap Gatito [Number: ___] : Number: [unfilled] [Diagnosis: ___] : Diagnosis: [unfilled] [____] : Post-Dive: Time - [unfilled] [___] : Post-Dive: Value - [unfilled] mg/dL [Clear all fields] : clear all fields [] : No [FreeTextEntry4] : 100 [FreeTextEntry6] : 3313 [FreeTextEntry8] : 6360 [de-identified] : 6014 [de-identified] : 1318 [de-identified] : 0980 [de-identified] : 9817 [de-identified] : 0260 [de-identified] : 7504 [de-identified] : 120mins

## 2022-08-11 NOTE — REVIEW OF SYSTEMS
[Fever] : no fever [Chills] : no chills [Eye Pain] : no eye pain [Loss Of Hearing] : no hearing loss [Shortness Of Breath] : no shortness of breath [Abdominal Pain] : no abdominal pain [Vomiting] : no vomiting [Skin Lesions] : no skin lesions [Skin Wound] : no skin wound [Anxiety] : no anxiety [Easy Bleeding] : no tendency for easy bleeding [FreeTextEntry5] : HTN,HLD [FreeTextEntry7] : 40.9 BMI , morbid obesity  [FreeTextEntry8] : Kidney Transplant  [FreeTextEntry9] : Associated Diabetic Charcot foot  [de-identified] : plantar right foot midfoot , lateral  DFU 2  , large area of peripheral callus build up , the ulcer is clean and granular s/p surgical intervention  [de-identified] : IDDM with neuropathy  [de-identified] : MARILUZ

## 2022-08-11 NOTE — HISTORY OF PRESENT ILLNESS
[FreeTextEntry1] : Pt seen for DFU plantar base 5th metatarsal right foot down to skin, subcutaneous tissue, fat. Pt also seen for right 5th metatarsal head lateral surgical wound which has reopened at this time. Irinajon relates that he will be going on a trip to [place] next week.

## 2022-08-11 NOTE — ASSESSMENT
[Verbal] : Verbal [Written] : Written [Demo] : Demo [Patient] : Patient [Good - alert, interested, motivated] : Good - alert, interested, motivated [Demonstrates independently] : demonstrates independently [Dressing changes] : dressing changes [Foot Care] : foot care [Skin Care] : skin care [Pressure relief] : pressure relief [Signs and symptoms of infection] : sign and symptoms of infection [How and When to Call] : how and when to call [Hyperbaric Therapy] : hyperbaric therapy [Off-loading] : off-loading [Compression Therapy] : compression therapy [Patient responsibility to plan of care] : patient responsibility to plan of care [Glycemic Control] : glycemic control [Stable] : stable [Home] : Home [Ambulatory] : Ambulatory [] : No [FreeTextEntry2] : Offloading/Pressure Relief  \par Infection Prevention \par Compression Therapy \par Offloading/Pressure Relief \par Encourage Glucose Control\par HBOT\par F/U  daily for HBOT and in 1 week for assessment. \par  [FreeTextEntry4] : Patient has completed 36/50 HBOT.\par Patient has developed a new wound on the right lateral foot 5th met, DPM aware.\par Patient is going away on vacation, he was educated by DPM on the importance of not submerging his wounds in water and s/s of infection.\par DPM recommended patient continue HBOT as prescribed.\par F/U 1 week

## 2022-08-11 NOTE — PLAN
[FreeTextEntry1] : Patient examined and evaluated at this time.\par Continue HBOT and advanced skin substitute graft application when patient returns from vacation\par Continue local wound care and offloading.\par Spent 20 minutes for patient care and medical decision making.\par Patient to follow up in 1 week.\par

## 2022-08-11 NOTE — VITALS
[Pain related to present condition?] : The patient's  pain is not related to present condition. [] : No [de-identified] : 0/10

## 2022-08-11 NOTE — ADDENDUM
[FreeTextEntry1] : Patients Order Verified Prior to Treatment. \par Patients Secondary Contraindication Examination Check Preformed by CHT. \par Patients Contraindication List and Pre-Check List, Verified and Signed by Technician and CHT Prior to Patients Treatment. \par \par Patient descended to 2.4 DEEPAK @ 2.2 PSI/MIN without incident in chamber #1.\par Patient resting comfortably @ depth, equal chest rise observed throughout treatment.\par Patient tolerated both air breaks well.\par Patient ascended from 2.4 DEEPAK @ 2.2 PSI/MIN without incident in chamber #1.\par Patient tolerated treatment well.\par Patient Exited the Hyperbaric Suite Safely.\par \par  PT WAS REMINDED OF SCHEDULED ASSESSMENT ON 08/11/22 AS WELL AS ARRIVAL TIME ON THAT DATE.\par \par

## 2022-08-12 ENCOUNTER — INPATIENT (INPATIENT)
Facility: HOSPITAL | Age: 57
LOS: 3 days | Discharge: HOME | DRG: 187 | End: 2022-08-16
Attending: INTERNAL MEDICINE | Admitting: INTERNAL MEDICINE
Payer: MEDICARE

## 2022-08-12 ENCOUNTER — OUTPATIENT (OUTPATIENT)
Dept: OUTPATIENT SERVICES | Facility: HOSPITAL | Age: 57
LOS: 1 days | Discharge: ANOTHER HOSPITAL | End: 2022-08-12

## 2022-08-12 ENCOUNTER — APPOINTMENT (OUTPATIENT)
Dept: HYPERBARIC MEDICINE | Facility: HOSPITAL | Age: 57
End: 2022-08-12

## 2022-08-12 VITALS
HEIGHT: 73 IN | SYSTOLIC BLOOD PRESSURE: 189 MMHG | TEMPERATURE: 98 F | DIASTOLIC BLOOD PRESSURE: 98 MMHG | RESPIRATION RATE: 18 BRPM | HEART RATE: 90 BPM | OXYGEN SATURATION: 98 % | WEIGHT: 313.94 LBS

## 2022-08-12 VITALS
SYSTOLIC BLOOD PRESSURE: 195 MMHG | DIASTOLIC BLOOD PRESSURE: 90 MMHG | OXYGEN SATURATION: 91 % | TEMPERATURE: 98.3 F | RESPIRATION RATE: 18 BRPM | HEART RATE: 93 BPM

## 2022-08-12 VITALS
HEART RATE: 78 BPM | RESPIRATION RATE: 18 BRPM | TEMPERATURE: 98.3 F | OXYGEN SATURATION: 95 % | DIASTOLIC BLOOD PRESSURE: 81 MMHG | SYSTOLIC BLOOD PRESSURE: 189 MMHG

## 2022-08-12 VITALS — SYSTOLIC BLOOD PRESSURE: 177 MMHG | DIASTOLIC BLOOD PRESSURE: 81 MMHG

## 2022-08-12 DIAGNOSIS — I63.9 CEREBRAL INFARCTION, UNSPECIFIED: ICD-10-CM

## 2022-08-12 DIAGNOSIS — Z94.0 KIDNEY TRANSPLANT STATUS: Chronic | ICD-10-CM

## 2022-08-12 DIAGNOSIS — Z94.0 KIDNEY TRANSPLANT STATUS: ICD-10-CM

## 2022-08-12 DIAGNOSIS — N18.6 END STAGE RENAL DISEASE: Chronic | ICD-10-CM

## 2022-08-12 DIAGNOSIS — E78.5 HYPERLIPIDEMIA, UNSPECIFIED: ICD-10-CM

## 2022-08-12 DIAGNOSIS — N39.0 URINARY TRACT INFECTION, SITE NOT SPECIFIED: ICD-10-CM

## 2022-08-12 DIAGNOSIS — I16.0 HYPERTENSIVE URGENCY: ICD-10-CM

## 2022-08-12 DIAGNOSIS — Z89.421 ACQUIRED ABSENCE OF OTHER RIGHT TOE(S): Chronic | ICD-10-CM

## 2022-08-12 DIAGNOSIS — E10.9 TYPE 1 DIABETES MELLITUS WITHOUT COMPLICATIONS: ICD-10-CM

## 2022-08-12 DIAGNOSIS — J90 PLEURAL EFFUSION, NOT ELSEWHERE CLASSIFIED: ICD-10-CM

## 2022-08-12 DIAGNOSIS — Z89.429 ACQUIRED ABSENCE OF OTHER TOE(S), UNSPECIFIED SIDE: ICD-10-CM

## 2022-08-12 DIAGNOSIS — Z29.9 ENCOUNTER FOR PROPHYLACTIC MEASURES, UNSPECIFIED: ICD-10-CM

## 2022-08-12 DIAGNOSIS — E11.621 TYPE 2 DIABETES MELLITUS WITH FOOT ULCER: ICD-10-CM

## 2022-08-12 DIAGNOSIS — R55 SYNCOPE AND COLLAPSE: ICD-10-CM

## 2022-08-12 LAB
ALBUMIN SERPL ELPH-MCNC: 3.6 G/DL — SIGNIFICANT CHANGE UP (ref 3.3–5)
ALP SERPL-CCNC: 108 U/L — SIGNIFICANT CHANGE UP (ref 40–120)
ALT FLD-CCNC: 16 U/L — SIGNIFICANT CHANGE UP (ref 12–78)
ANION GAP SERPL CALC-SCNC: 5 MMOL/L — SIGNIFICANT CHANGE UP (ref 5–17)
APTT BLD: 29.1 SEC — SIGNIFICANT CHANGE UP (ref 27.5–35.5)
AST SERPL-CCNC: 16 U/L — SIGNIFICANT CHANGE UP (ref 15–37)
BASOPHILS # BLD AUTO: 0.05 K/UL — SIGNIFICANT CHANGE UP (ref 0–0.2)
BASOPHILS NFR BLD AUTO: 0.8 % — SIGNIFICANT CHANGE UP (ref 0–2)
BILIRUB SERPL-MCNC: 0.6 MG/DL — SIGNIFICANT CHANGE UP (ref 0.2–1.2)
BUN SERPL-MCNC: 16 MG/DL — SIGNIFICANT CHANGE UP (ref 7–23)
CALCIUM SERPL-MCNC: 9.3 MG/DL — SIGNIFICANT CHANGE UP (ref 8.5–10.1)
CHLORIDE SERPL-SCNC: 104 MMOL/L — SIGNIFICANT CHANGE UP (ref 96–108)
CO2 SERPL-SCNC: 30 MMOL/L — SIGNIFICANT CHANGE UP (ref 22–31)
CREAT SERPL-MCNC: 1 MG/DL — SIGNIFICANT CHANGE UP (ref 0.5–1.3)
EGFR: 89 ML/MIN/1.73M2 — SIGNIFICANT CHANGE UP
EOSINOPHIL # BLD AUTO: 0.19 K/UL — SIGNIFICANT CHANGE UP (ref 0–0.5)
EOSINOPHIL NFR BLD AUTO: 3.2 % — SIGNIFICANT CHANGE UP (ref 0–6)
GLUCOSE SERPL-MCNC: 209 MG/DL — HIGH (ref 70–99)
HCT VFR BLD CALC: 42.5 % — SIGNIFICANT CHANGE UP (ref 39–50)
HGB BLD-MCNC: 13.9 G/DL — SIGNIFICANT CHANGE UP (ref 13–17)
IMM GRANULOCYTES NFR BLD AUTO: 0.3 % — SIGNIFICANT CHANGE UP (ref 0–1.5)
INR BLD: 1.06 RATIO — SIGNIFICANT CHANGE UP (ref 0.88–1.16)
LYMPHOCYTES # BLD AUTO: 0.73 K/UL — LOW (ref 1–3.3)
LYMPHOCYTES # BLD AUTO: 12.1 % — LOW (ref 13–44)
MCHC RBC-ENTMCNC: 28.4 PG — SIGNIFICANT CHANGE UP (ref 27–34)
MCHC RBC-ENTMCNC: 32.7 GM/DL — SIGNIFICANT CHANGE UP (ref 32–36)
MCV RBC AUTO: 86.7 FL — SIGNIFICANT CHANGE UP (ref 80–100)
MONOCYTES # BLD AUTO: 0.52 K/UL — SIGNIFICANT CHANGE UP (ref 0–0.9)
MONOCYTES NFR BLD AUTO: 8.6 % — SIGNIFICANT CHANGE UP (ref 2–14)
NEUTROPHILS # BLD AUTO: 4.52 K/UL — SIGNIFICANT CHANGE UP (ref 1.8–7.4)
NEUTROPHILS NFR BLD AUTO: 75 % — SIGNIFICANT CHANGE UP (ref 43–77)
NRBC # BLD: 0 /100 WBCS — SIGNIFICANT CHANGE UP (ref 0–0)
PLATELET # BLD AUTO: 179 K/UL — SIGNIFICANT CHANGE UP (ref 150–400)
POTASSIUM SERPL-MCNC: 4.3 MMOL/L — SIGNIFICANT CHANGE UP (ref 3.5–5.3)
POTASSIUM SERPL-SCNC: 4.3 MMOL/L — SIGNIFICANT CHANGE UP (ref 3.5–5.3)
PROT SERPL-MCNC: 7.7 G/DL — SIGNIFICANT CHANGE UP (ref 6–8.3)
PROTHROM AB SERPL-ACNC: 12.4 SEC — SIGNIFICANT CHANGE UP (ref 10.5–13.4)
RBC # BLD: 4.9 M/UL — SIGNIFICANT CHANGE UP (ref 4.2–5.8)
RBC # FLD: 13.4 % — SIGNIFICANT CHANGE UP (ref 10.3–14.5)
SARS-COV-2 RNA SPEC QL NAA+PROBE: SIGNIFICANT CHANGE UP
SODIUM SERPL-SCNC: 139 MMOL/L — SIGNIFICANT CHANGE UP (ref 135–145)
TROPONIN I, HIGH SENSITIVITY RESULT: 19 NG/L — SIGNIFICANT CHANGE UP
TROPONIN I, HIGH SENSITIVITY RESULT: 20.1 NG/L — SIGNIFICANT CHANGE UP
WBC # BLD: 6.03 K/UL — SIGNIFICANT CHANGE UP (ref 3.8–10.5)
WBC # FLD AUTO: 6.03 K/UL — SIGNIFICANT CHANGE UP (ref 3.8–10.5)

## 2022-08-12 PROCEDURE — 99183 HYPERBARIC OXYGEN THERAPY: CPT

## 2022-08-12 PROCEDURE — 71045 X-RAY EXAM CHEST 1 VIEW: CPT | Mod: 26

## 2022-08-12 PROCEDURE — 99222 1ST HOSP IP/OBS MODERATE 55: CPT

## 2022-08-12 PROCEDURE — 93010 ELECTROCARDIOGRAM REPORT: CPT

## 2022-08-12 PROCEDURE — 99223 1ST HOSP IP/OBS HIGH 75: CPT | Mod: GC,AI

## 2022-08-12 PROCEDURE — 99285 EMERGENCY DEPT VISIT HI MDM: CPT

## 2022-08-12 PROCEDURE — 71250 CT THORAX DX C-: CPT | Mod: 26

## 2022-08-12 PROCEDURE — 70450 CT HEAD/BRAIN W/O DYE: CPT | Mod: 26,MA

## 2022-08-12 RX ORDER — CHOLECALCIFEROL (VITAMIN D3) 125 MCG
50000 CAPSULE ORAL
Refills: 0 | Status: DISCONTINUED | OUTPATIENT
Start: 2022-08-12 | End: 2022-08-12

## 2022-08-12 RX ORDER — DEXTROSE 50 % IN WATER 50 %
15 SYRINGE (ML) INTRAVENOUS ONCE
Refills: 0 | Status: DISCONTINUED | OUTPATIENT
Start: 2022-08-12 | End: 2022-08-16

## 2022-08-12 RX ORDER — ENOXAPARIN SODIUM 100 MG/ML
40 INJECTION SUBCUTANEOUS EVERY 24 HOURS
Refills: 0 | Status: DISCONTINUED | OUTPATIENT
Start: 2022-08-12 | End: 2022-08-12

## 2022-08-12 RX ORDER — HYDRALAZINE HCL 50 MG
50 TABLET ORAL
Refills: 0 | Status: DISCONTINUED | OUTPATIENT
Start: 2022-08-12 | End: 2022-08-13

## 2022-08-12 RX ORDER — ASPIRIN/CALCIUM CARB/MAGNESIUM 324 MG
325 TABLET ORAL DAILY
Refills: 0 | Status: DISCONTINUED | OUTPATIENT
Start: 2022-08-12 | End: 2022-08-16

## 2022-08-12 RX ORDER — GABAPENTIN 400 MG/1
300 CAPSULE ORAL
Refills: 0 | Status: DISCONTINUED | OUTPATIENT
Start: 2022-08-12 | End: 2022-08-16

## 2022-08-12 RX ORDER — LANOLIN ALCOHOL/MO/W.PET/CERES
5 CREAM (GRAM) TOPICAL ONCE
Refills: 0 | Status: COMPLETED | OUTPATIENT
Start: 2022-08-12 | End: 2022-08-12

## 2022-08-12 RX ORDER — SODIUM CHLORIDE 9 MG/ML
1000 INJECTION, SOLUTION INTRAVENOUS
Refills: 0 | Status: DISCONTINUED | OUTPATIENT
Start: 2022-08-12 | End: 2022-08-16

## 2022-08-12 RX ORDER — TACROLIMUS 5 MG/1
1.5 CAPSULE ORAL EVERY 12 HOURS
Refills: 0 | Status: DISCONTINUED | OUTPATIENT
Start: 2022-08-12 | End: 2022-08-16

## 2022-08-12 RX ORDER — METOPROLOL TARTRATE 50 MG
25 TABLET ORAL THREE TIMES A DAY
Refills: 0 | Status: DISCONTINUED | OUTPATIENT
Start: 2022-08-12 | End: 2022-08-16

## 2022-08-12 RX ORDER — ASPIRIN/CALCIUM CARB/MAGNESIUM 324 MG
81 TABLET ORAL DAILY
Refills: 0 | Status: DISCONTINUED | OUTPATIENT
Start: 2022-08-12 | End: 2022-08-12

## 2022-08-12 RX ORDER — HEPARIN SODIUM 5000 [USP'U]/ML
5000 INJECTION INTRAVENOUS; SUBCUTANEOUS EVERY 12 HOURS
Refills: 0 | Status: DISCONTINUED | OUTPATIENT
Start: 2022-08-12 | End: 2022-08-12

## 2022-08-12 RX ORDER — AZATHIOPRINE 100 MG/1
50 TABLET ORAL
Refills: 0 | Status: DISCONTINUED | OUTPATIENT
Start: 2022-08-12 | End: 2022-08-12

## 2022-08-12 RX ORDER — INSULIN LISPRO 100/ML
1 VIAL (ML) SUBCUTANEOUS
Refills: 0 | Status: DISCONTINUED | OUTPATIENT
Start: 2022-08-12 | End: 2022-08-12

## 2022-08-12 RX ORDER — ATORVASTATIN CALCIUM 80 MG/1
10 TABLET, FILM COATED ORAL AT BEDTIME
Refills: 0 | Status: DISCONTINUED | OUTPATIENT
Start: 2022-08-12 | End: 2022-08-16

## 2022-08-12 RX ORDER — DEXTROSE 50 % IN WATER 50 %
25 SYRINGE (ML) INTRAVENOUS ONCE
Refills: 0 | Status: DISCONTINUED | OUTPATIENT
Start: 2022-08-12 | End: 2022-08-16

## 2022-08-12 RX ORDER — AZATHIOPRINE 100 MG/1
100 TABLET ORAL DAILY
Refills: 0 | Status: DISCONTINUED | OUTPATIENT
Start: 2022-08-12 | End: 2022-08-16

## 2022-08-12 RX ORDER — GLUCAGON INJECTION, SOLUTION 0.5 MG/.1ML
1 INJECTION, SOLUTION SUBCUTANEOUS ONCE
Refills: 0 | Status: DISCONTINUED | OUTPATIENT
Start: 2022-08-12 | End: 2022-08-16

## 2022-08-12 RX ORDER — LOSARTAN POTASSIUM 100 MG/1
25 TABLET, FILM COATED ORAL DAILY
Refills: 0 | Status: DISCONTINUED | OUTPATIENT
Start: 2022-08-12 | End: 2022-08-14

## 2022-08-12 RX ORDER — INSULIN LISPRO 100/ML
1 VIAL (ML) SUBCUTANEOUS
Refills: 0 | Status: DISCONTINUED | OUTPATIENT
Start: 2022-08-12 | End: 2022-08-16

## 2022-08-12 RX ORDER — INSULIN LISPRO 100/ML
0 VIAL (ML) SUBCUTANEOUS
Qty: 0 | Refills: 0 | DISCHARGE

## 2022-08-12 RX ORDER — HEPARIN SODIUM 5000 [USP'U]/ML
5000 INJECTION INTRAVENOUS; SUBCUTANEOUS EVERY 8 HOURS
Refills: 0 | Status: DISCONTINUED | OUTPATIENT
Start: 2022-08-12 | End: 2022-08-14

## 2022-08-12 RX ORDER — ERGOCALCIFEROL 1.25 MG/1
50000 CAPSULE ORAL
Refills: 0 | Status: DISCONTINUED | OUTPATIENT
Start: 2022-08-12 | End: 2022-08-12

## 2022-08-12 RX ORDER — HYDRALAZINE HCL 50 MG
1 TABLET ORAL
Qty: 0 | Refills: 0 | DISCHARGE

## 2022-08-12 RX ORDER — METOPROLOL TARTRATE 50 MG
3 TABLET ORAL
Qty: 0 | Refills: 0 | DISCHARGE

## 2022-08-12 RX ORDER — ASPIRIN/CALCIUM CARB/MAGNESIUM 324 MG
325 TABLET ORAL ONCE
Refills: 0 | Status: COMPLETED | OUTPATIENT
Start: 2022-08-12 | End: 2022-08-12

## 2022-08-12 RX ORDER — TACROLIMUS 5 MG/1
1 CAPSULE ORAL
Qty: 0 | Refills: 0 | DISCHARGE

## 2022-08-12 RX ORDER — TACROLIMUS 5 MG/1
1 CAPSULE ORAL EVERY 12 HOURS
Refills: 0 | Status: DISCONTINUED | OUTPATIENT
Start: 2022-08-12 | End: 2022-08-12

## 2022-08-12 RX ORDER — ASPIRIN/CALCIUM CARB/MAGNESIUM 324 MG
325 TABLET ORAL ONCE
Refills: 0 | Status: DISCONTINUED | OUTPATIENT
Start: 2022-08-12 | End: 2022-08-12

## 2022-08-12 RX ORDER — DEXTROSE 50 % IN WATER 50 %
12.5 SYRINGE (ML) INTRAVENOUS ONCE
Refills: 0 | Status: DISCONTINUED | OUTPATIENT
Start: 2022-08-12 | End: 2022-08-16

## 2022-08-12 RX ADMIN — Medication 25 MILLIGRAM(S): at 14:28

## 2022-08-12 RX ADMIN — Medication 1 EACH: at 22:46

## 2022-08-12 RX ADMIN — Medication 5 MILLIGRAM(S): at 23:46

## 2022-08-12 RX ADMIN — TACROLIMUS 1.5 MILLIGRAM(S): 5 CAPSULE ORAL at 23:33

## 2022-08-12 RX ADMIN — Medication 325 MILLIGRAM(S): at 18:31

## 2022-08-12 RX ADMIN — Medication 0.1 MILLIGRAM(S): at 20:27

## 2022-08-12 RX ADMIN — GABAPENTIN 300 MILLIGRAM(S): 400 CAPSULE ORAL at 20:25

## 2022-08-12 RX ADMIN — HEPARIN SODIUM 5000 UNIT(S): 5000 INJECTION INTRAVENOUS; SUBCUTANEOUS at 22:45

## 2022-08-12 RX ADMIN — ATORVASTATIN CALCIUM 10 MILLIGRAM(S): 80 TABLET, FILM COATED ORAL at 22:46

## 2022-08-12 RX ADMIN — Medication 50 MILLIGRAM(S): at 20:25

## 2022-08-12 RX ADMIN — Medication 25 MILLIGRAM(S): at 22:45

## 2022-08-12 NOTE — ED PROVIDER NOTE - CLINICAL SUMMARY MEDICAL DECISION MAKING FREE TEXT BOX
Patient presenting with 15-second period of unresponsiveness and shaking of bilateral arms during hyperbaric treatments at wound center.  Patient now back to his baseline.    Plan EKG chest x-ray CT labs cardiology and neurology consults.

## 2022-08-12 NOTE — H&P ADULT - NSICDXPASTMEDICALHX_GEN_ALL_CORE_FT
PAST MEDICAL HISTORY:  CKD (chronic kidney disease) Renal Transplant 2013 at 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

## 2022-08-12 NOTE — H&P ADULT - NSHPREVIEWOFSYSTEMS_GEN_ALL_CORE
REVIEW OF SYSTEMS:  Constitutional: denies fever, chills, diaphoresis  HEENT: denies sore throat, runny nose, blurry vision, double vision    Respiratory: denies SOB, WOOTEN, cough, wheezing, hemoptysis  Cardiovascular: denies CP, palpitations, LE edema  Gastrointestinal:  denies nausea, vomiting, diarrhea, constipation, abdominal pain, melena, hematochezia   Genitourinary: denies dysuria, hematuria  Skin/Breast: denies rash, hives, itching   Musculoskeletal: denies myalgias, arthritis, joint swelling, muscle weakness  Neurologic: denies syncope, LOC, headache, weakness, dizziness REVIEW OF SYSTEMS:  Constitutional: denies fever, chills, diaphoresis  HEENT: denies sore throat, runny nose, blurry vision, double vision    Respiratory: denies SOB, WOOTEN, cough, wheezing, hemoptysis  Cardiovascular: denies CP, palpitations  Gastrointestinal:  denies nausea, vomiting, diarrhea, constipation, abdominal pain, melena, hematochezia   Genitourinary: denies dysuria, hematuria  Skin/Breast: denies rash, hives, itching   Musculoskeletal: denies myalgias, arthritis, joint swelling, muscle weakness  Neurologic: denies headache, weakness, dizziness

## 2022-08-12 NOTE — ED PROVIDER NOTE - OBJECTIVE STATEMENT
Patient presenting with seizure while in hyperbaric chamber.  Per hyperbaric staff patient had gone into the chamber with a blood sugar of 134 during his treatment he developed a 15-second period of unresponsiveness with shaking of his arms, patient had a few seconds of confusion but then returned to baseline mental status quickly was following commands speaking clearly and was able to take 4 glucose tabs as directed by the staff of hyperbaric center.  After patient was brought back to normal atmospheric pressure and was removed from the chamber and he denied any current symptoms.  Patient does not recall events that occurred.  Patient denies headache nausea vomiting chest pain short of breath abdominal pain focal weakness or numbness.    PMD Miranda Cards Leticia

## 2022-08-12 NOTE — PATIENT PROFILE ADULT - FALL HARM RISK - HARM RISK INTERVENTIONS

## 2022-08-12 NOTE — ED PROVIDER NOTE - NSICDXPASTMEDICALHX_GEN_ALL_CORE_FT
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

## 2022-08-12 NOTE — H&P ADULT - PROBLEM SELECTOR PLAN 8
Lovenox 40mg qd for DVT prophylaxis Hx of multiple toe amputations in the past 2/2 diabetic neuropathy  - Continue diabetic management as above

## 2022-08-12 NOTE — ED PROVIDER NOTE - EYES, MLM
Clear bilaterally, pupils equal, round and reactive to light. drooping to right eyelid (chronic per pt)

## 2022-08-12 NOTE — H&P ADULT - PROBLEM SELECTOR PLAN 7
therapeutic interchange ordered, start Atorvastatin Hx of CVA in 2015  Patient with residual R ptosis and L pain/temp loss  CT Head negative for acute intracranial pathology  continue home BP meds as doubt acute CVA  Neuro consulted, Dr Gill, f/u recs

## 2022-08-12 NOTE — H&P ADULT - PROBLEM SELECTOR PLAN 2
Patient had syncopal episode, found to have SOB   CXR performed, R pleural effusion appreciated  Patient had prev thoracentesis for pleural effusion 2 months ago, 600cc drained, analysis normal per patient  thoracentesis per pulLianet darling consulted Patient had syncopal episode  CXR performed, R pleural effusion appreciated  Patient had prev thoracentesis for pleural effusion 2 months ago, 600cc drained, analysis normal per patient  thoracentesis per pulLianet darling consulted CXR performed, R pleural effusion appreciated  Patient had prev thoracentesis for pleural effusion 2 months ago, 600cc drained, analysis normal per patient  -f/u CT chest  -patient may require thoracentesis, f/u pulm rec's

## 2022-08-12 NOTE — H&P ADULT - NSHPSOCIALHISTORY_GEN_ALL_CORE
Patient does not smoke, drink, or use illicit substances. He lives at home with his wife and kids. He ambulates independantly and is sexually active. Patient does not smoke, drink, or use illicit substances. He lives at home with his wife and kids. He ambulates independently and is sexually active.

## 2022-08-12 NOTE — H&P ADULT - PROBLEM SELECTOR PLAN 6
Due to diabetic neuropathys  continue glycemic control with home insulin pump Hx of chronic UTIs, continue home bactrim 400/80 mg po qd

## 2022-08-12 NOTE — ED ADULT NURSE NOTE - NSICDXPASTMEDICALHX_GEN_ALL_CORE_FT
PAST MEDICAL HISTORY:  CKD (chronic kidney disease) Renal Transplant 2013 at [facility]    CVA (cerebral vascular accident) Wall[place] Stroke  low L body sensation  low R face sensation  decreased peripheral vision  poor balance    Diabetes mellitus Type 1 on insulin pump    Diabetic peripheral neuropathy     History of DVT (deep vein thrombosis) s/p eliquis    Hyperlipidemia     Hypertension     Obesity (BMI 30-39.9)     Recurrent squamous cell carcinoma of skin nose, L arm    Squamous cell carcinoma of skin of left ear

## 2022-08-12 NOTE — ED PROVIDER NOTE - NEUROLOGICAL, MLM
Alert and oriented, dropping to right eyelid, mild decreased sensation right side of face (both chronic per pt) otherwise no motor or sensory deficits.

## 2022-08-12 NOTE — H&P ADULT - PROBLEM SELECTOR PLAN 4
Glucose 209 on admission, patient was given 4x sugar pills at time of LOC in wound care  Continue home insulin pump Hx of renal transplant   - continue home tacrolimus 1mg po q12hrs and azathioprine 50mg po bid Hx of renal transplant   - continue home tacrolimus 1.5mg po q12hrs and azathioprine 100mg po daily  -doses confirmed with patient  -nephro consulted Dr Bryant

## 2022-08-12 NOTE — CONSULT NOTE ADULT - SUBJECTIVE AND OBJECTIVE BOX
Date/Time Patient Seen:  		  Referring MD:   Data Reviewed	       Patient is a 56y old  Male who presents with a chief complaint of LOC, R pleural effusion (12 Aug 2022 13:23)      Subjective/HPI     PAST MEDICAL & SURGICAL HISTORY:  Hypertension    Hyperlipidemia    Diabetes mellitus  Type 1 on insulin pump    CKD (chronic kidney disease)  Renal Transplant 2013 at [facility]    Diabetic peripheral neuropathy    Obesity (BMI 30-39.9)    CVA (cerebral vascular accident)  Wallenberg Stroke  low L body sensation  low R face sensation  decreased peripheral vision  poor balance    Squamous cell carcinoma of skin of left ear    History of DVT (deep vein thrombosis)  s/p eliquis    Recurrent squamous cell carcinoma of skin  nose, L arm    Toe infection  2007 L 4th Toe surgery-amputation secondary to osteomyelitis    Ear disease  Left inner ear surgery, 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, Can NOT have MRI    Vasectomy status  s/p b/l  vasectomy    H/O foot surgery  2005 - right foot - bone fracture - s/p ORIF and subsequent removal of hardware    End-stage renal failure with renal transplant  12/2013    S/P kidney transplant    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 1000 milliLiter(s) (100 mL/Hr) IV Continuous <Continuous>  dextrose 5%. 1000 milliLiter(s) (50 mL/Hr) IV Continuous <Continuous>  dextrose 50% Injectable 25 Gram(s) IV Push once  dextrose 50% Injectable 12.5 Gram(s) IV Push once  dextrose 50% Injectable 25 Gram(s) IV Push once  gabapentin 300 milliGRAM(s) Oral two times a day  glucagon  Injectable 1 milliGRAM(s) IntraMuscular once  heparin   Injectable 5000 Unit(s) SubCutaneous every 8 hours  hydrALAZINE 50 milliGRAM(s) Oral two times a day  insulin lispro (ADMELOG) Pump 1 Each SubCutaneous Continuous Pump  losartan 25 milliGRAM(s) Oral daily  metoprolol tartrate 25 milliGRAM(s) Oral three times a day  tacrolimus 1.5 milliGRAM(s) Oral every 12 hours  trimethoprim   80 mG/sulfamethoxazole 400 mG 1 Tablet(s) Oral daily    MEDICATIONS  (PRN):  dextrose Oral Gel 15 Gram(s) Oral once PRN Blood Glucose LESS THAN 70 milliGRAM(s)/deciliter         Vitals log        ICU Vital Signs Last 24 Hrs  T(C): 36.7 (12 Aug 2022 15:51), Max: 36.8 (12 Aug 2022 10:18)  T(F): 98 (12 Aug 2022 15:51), Max: 98.3 (12 Aug 2022 10:18)  HR: 84 (12 Aug 2022 15:51) (78 - 90)  BP: 165/72 (12 Aug 2022 15:51) (148/66 - 189/98)  BP(mean): --  ABP: --  ABP(mean): --  RR: 18 (12 Aug 2022 15:51) (18 - 18)  SpO2: 98% (12 Aug 2022 15:51) (97% - 98%)    O2 Parameters below as of 12 Aug 2022 15:51  Patient On (Oxygen Delivery Method): nasal cannula  O2 Flow (L/min): 2               Input and Output:  I&O's Detail      Lab Data                        13.9   6.03  )-----------( 179      ( 12 Aug 2022 10:20 )             42.5     08-12    139  |  104  |  16  ----------------------------<  209<H>  4.3   |  30  |  1.00    Ca    9.3      12 Aug 2022 10:20    TPro  7.7  /  Alb  3.6  /  TBili  0.6  /  DBili  x   /  AST  16  /  ALT  16  /  AlkPhos  108  08-12            Review of Systems	      Objective     Physical Examination        Pertinent Lab findings & Imaging      Benitez:  NO   Adequate UO     I&O's Detail           Discussed with:     Cultures:	        Radiology      ACC: [id number] EXAM:  CT CHEST                          PROCEDURE DATE:  08/12/2022          INTERPRETATION:  INDICATION: pleural effusion  TECHNIQUE: Unenhanced CT of the chest. Coronal, sagittal, and MIP images   were reconstructed and reviewed.  COMPARISON: 2/8/2022 chest CT.    FINDINGS:    AIRWAYS, LUNGS and PLEURA: Patent central airways. Large right pleural   effusion and atelectasis of right lower lobe. The lungs are otherwise   clear.    MEDIASTINUM AND JES: No lymphadenopathy.    HEART AND VESSELS: Heart size is normal. No pericardial effusion.   Thoracic aorta and pulmonary artery normal in diameter. Mild coronary   calcification.    VISUALIZED UPPER ABDOMEN: Lateral renal atrophy.    CHEST WALL AND BONES: No aggressive osseous lesion. Subcutaneous loop   recorder.    LOWER NECK: Within normal limits.    IMPRESSION:    Large right pleural effusion and atelectasis of right lower lobe.    --- End of Report ---            TRI KOCH MD; Attending Radiologist  This document has been electronically signed. Aug 12 2022  5:01PM

## 2022-08-12 NOTE — CONSULT NOTE ADULT - SUBJECTIVE AND OBJECTIVE BOX
· Subjective and Objective:   [facility] CARDIOLOGY CONSULTANTS:    Julisa Jansen, Luly, Leticia, Eugenio Russo Goodger      [phone]    CHIEF COMPLAINT: Patient is a 56y old  Male who presents with a chief complaint of syncope    Pt is a 57 y/o M with PMH HTN, HLD, CKD s/p renal transplant 2013, CARMEN on CPAP, chronic foot ulcer for which he is currently receiving hyperbaric oxygen treatments.  Today he was in his usual state of health - went for a hyperbaric oxygen treatment and while there he started to feel whoozy.  He remembers someone knocking on the door and telling him to have a sugar tablet which he did and then they pulled him out.  He states he remembers speaking with the healthcare workers and not feeling out of it.  He denies CP/SOB/palpitations.  He did not eat breakfast this morning and drank 1 glass of water.  He currently feels well and has no complaints.  In ED ECG shows NSR with PVCs    He was seen by Dr Kelly last month.  TTE 07/2022 EF 65% with min MR/TR  Pharm nuc stress test showed normal myocardial perfusion     ROS otherwise negative unless noted        PAST MEDICAL & SURGICAL HISTORY:  Hypertension      Hyperlipidemia      Diabetes mellitus  Type 1 on insulin pump      CKD (chronic kidney disease)  Renal Transplant 2013 at [facility]      Diabetic peripheral neuropathy      Obesity (BMI 30-39.9)      CVA (cerebral vascular accident)  Wallenberg Stroke  low L body sensation  low R face sensation  decreased peripheral vision  poor balance      Squamous cell carcinoma of skin of left ear      History of DVT (deep vein thrombosis)  s/p eliquis      Recurrent squamous cell carcinoma of skin  nose, L arm      Toe infection  2007 L 4th Toe surgery-amputation secondary to osteomyelitis      Ear disease  Left inner ear surgery, 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, Can NOT have MRI      Vasectomy status  s/p b/l  vasectomy      H/O foot surgery  2005 - right foot - bone fracture - s/p ORIF and subsequent removal of hardware      End-stage renal failure with renal transplant  12/2013      S/P kidney transplant      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          MEDICATIONS  (STANDING):      Allergies    No Known Allergies    Intolerances                              13.9   6.03  )-----------( 179      ( 12 Aug 2022 10:20 )             42.5       08-12    139  |  104  |  16  ----------------------------<  209<H>  4.3   |  30  |  1.00    Ca    9.3      12 Aug 2022 10:20    TPro  7.7  /  Alb  3.6  /  TBili  0.6  /  DBili  x   /  AST  16  /  ALT  16  /  AlkPhos  108  08-12      LIVER FUNCTIONS - ( 12 Aug 2022 10:20 )  Alb: 3.6 g/dL / Pro: 7.7 g/dL / ALK PHOS: 108 U/L / ALT: 16 U/L / AST: 16 U/L / GGT: x             PT/INR - ( 12 Aug 2022 10:20 )   PT: 12.4 sec;   INR: 1.06 ratio         PTT - ( 12 Aug 2022 10:20 )  PTT:29.1 sec                      Daily Height in cm: 185.42 (12 Aug 2022 10:18)    Daily     I&O's Summary      Vital Signs Last 24 Hrs  T(C): 36.8 (12 Aug 2022 10:18), Max: 36.8 (12 Aug 2022 10:18)  T(F): 98.3 (12 Aug 2022 10:18), Max: 98.3 (12 Aug 2022 10:18)  HR: 90 (12 Aug 2022 10:18) (90 - 90)  BP: 189/98 (12 Aug 2022 10:18) (189/98 - 189/98)  BP(mean): --  RR: 18 (12 Aug 2022 10:18) (18 - 18)  SpO2: 98% (12 Aug 2022 10:18) (98% - 98%)    Parameters below as of 12 Aug 2022 10:18  Patient On (Oxygen Delivery Method): room air        PHYSICAL EXAM:   · Constitutional	Well-developed, well nourished  · Eyes	EOMI; PERRL; no drainage or redness  · ENMT	No oral lesions; no gross abnormalities  · Neck	No bruits; no thyromegaly or nodules  · Respiratory	Normal breath sounds b/l, No RRW  · Cardiovascular	Regular rate & rhythm, normal S1, S2; no murmurs, gallops or rubs; no S3, S4  · Gastrointestinal	Soft, non-tender, no hepatosplenomegaly, normal bowel sounds  · Extremities	No cyanosis, clubbing or edema  · Vascular	Equal and normal pulses (carotid, femoral, dorsalis pedis)  · Neurological	Alert & oriented; no sensory, motor or coordination deficits, normal reflexes

## 2022-08-12 NOTE — H&P ADULT - NSHPPHYSICALEXAM_GEN_ALL_CORE
T(C): 36.8 (08-12-22 @ 10:18), Max: 36.8 (08-12-22 @ 10:18)  HR: 90 (08-12-22 @ 10:18) (90 - 90)  BP: 189/98 (08-12-22 @ 10:18) (189/98 - 189/98)  RR: 18 (08-12-22 @ 10:18) (18 - 18)  SpO2: 98% (08-12-22 @ 10:18) (98% - 98%)    GENERAL: patient appears well, no acute distress, appropriate, pleasant  EYES: sclera clear, no exudates  ENMT: oropharynx clear without erythema, no exudates, moist mucous membranes  NECK: supple, soft   LUNGS:  clear to auscultation, symmetric breath sounds, no wheezing or rhonchi appreciated  HEART: soft S1/S2, regular rate and rhythm, no murmurs noted, 1+ lower extremity edema  GASTROINTESTINAL: abdomen is soft, nontender, nondistended, normoactive bowel sounds, no palpable masses  INTEGUMENT: venous stasis changes and diabetic foot ulcer on RLE   MUSCULOSKELETAL: no clubbing or cyanosis, no obvious deformity  NEUROLOGIC: awake, alert, oriented x3, good muscle tone in 4 extremities, R eyelid droop, L side sensory deficit   PSYCHIATRIC: mood is good, affect is congruent, linear and logical thought process  HEME/LYMPH:  no obvious ecchymosis or petechiae T(C): 36.8 (08-12-22 @ 10:18), Max: 36.8 (08-12-22 @ 10:18)  HR: 90 (08-12-22 @ 10:18) (90 - 90)  BP: 189/98 (08-12-22 @ 10:18) (189/98 - 189/98)  RR: 18 (08-12-22 @ 10:18) (18 - 18)  SpO2: 98% (08-12-22 @ 10:18) (98% - 98%)    GENERAL: patient appears well, no acute distress, appropriate, pleasant  EYES: sclera clear, no exudates, R side ptosis   ENMT: oropharynx clear without erythema, no exudates, moist mucous membranes  NECK: supple, soft   LUNGS:  clear to auscultation, symmetric breath sounds, no wheezing or rhonchi appreciated  HEART: soft S1/S2, regular rate and rhythm, no murmurs noted, 1+ lower extremity edema  GASTROINTESTINAL: abdomen is soft, nontender, nondistended, normoactive bowel sounds, no palpable masses  INTEGUMENT: venous stasis changes and diabetic foot ulcer on RLE   MUSCULOSKELETAL: no clubbing or cyanosis, no obvious deformity  NEUROLOGIC: awake, alert, oriented x3, good muscle tone in 4 extremities, R eyelid droop, L side sensory deficit   PSYCHIATRIC: mood is good, affect is congruent, linear and logical thought process  HEME/LYMPH:  no obvious ecchymosis or petechiae

## 2022-08-12 NOTE — CONSULT NOTE ADULT - ASSESSMENT
Pt is a 55 y/o M with PMH HTN, HLD, CKD s/p renal transplant 2013, CARMEN on CPAP, chronic foot ulcer for which he is currently receiving hyperbaric oxygen treatments.  Today he was in his usual state of health - went for a hyperbaric oxygen treatment where he had a possible syncopal event  He currently feels well and has no complaints.    TTE 07/2022 EF 65% with min MR/TR  Pharm nuc stress test showed normal myocardial perfusion   In ED ECG shows NSR with PVCs  trop 19  BP elevated in ED  Head CT shows no acute events  Possible vasovagal event  If pt clinically remains stable, can be discharged with outpt f/u    Thank you for this consult!

## 2022-08-12 NOTE — PROCEDURE
[Outpatient] : Outpatient [Ambulatory] : Patient is ambulatory. [THIS CHAMBER HAS BEEN CLEANED / DISINFECTED] : This chamber has been cleaned / disinfected according to local and hospital policy and procedure prior to this treatment. [Patient demonstrated and verbalized proper technique for using air break mask] : Patient demonstrated and verbalized proper technique for using air break mask [Patient educated on the risks of SMOKING prior to HBOT with understanding] : Patient educated on the risks of SMOKING prior to HBOT with understanding [Patient educated on the risks of CONSUMING ALCOHOL prior to HBOT with understanding] : Patient educated on the risks of CONSUMING ALCOHOL prior to HBOT with understanding [100% Cotton] : 100% cotton [Empty all pockets] : empty all pockets [No hair oils, wigs, hairpieces, pins] : no hair oils, wigs, hairpieces, pins  [Pre tx medications] : pre tx medications  [No make-up, creams] : no make-up, creams  [No jewelry] : no jewelry  [No matches, cigarettes, lighters] : no matches, cigarettes, lighters  [Hearing aid removed] : hearing aid removed [Dentures removed] : dentures removed [Ground bracelet on pt's wrist] : ground bracelet on pt's wrist  [Contacts removed] : contacts removed  [Remove nail polish] : remove nail polish  [No reading material] : no reading material  [Bra, undergarments removed] : bra, undergarments removed  [No contraindicated dressings] : no contraindicated dressings [Ground Wire - VISUAL Verification - Intact/Free of Obstruction] : Ground Wire - VISUAL Verification - Intact/Free of Obstruction  [Ground Continuity - Verified < 1 ohm w/ Wrist Strap Gatito] : Ground Continuity - Verified < 1 ohm w/ Wrist Strap [place]ito [Number: ___] : Number: [unfilled] [Diagnosis: ___] : Diagnosis: [unfilled] [____] : Post-Dive: Time - [unfilled] [___] : Post-Dive: Value - [unfilled] mg/dL [Clear all fields] : clear all fields [] : No [FreeTextEntry4] : 89 [FreeTextEntry6] : 5892 [FreeTextEntry8] : 7797 [de-identified] : 5996 [de-identified] : 1253 [de-identified] : 0871 [de-identified] : 3322 [de-identified] : 1000 [de-identified] : 1012 [de-identified] : 104 MINUTES

## 2022-08-12 NOTE — H&P ADULT - PROBLEM SELECTOR PLAN 5
CVA in 2015  Patient with residual R ptosis and L pain/temp loss  CT Head negative for acute intracranial pathology  continue home BP meds  Neuro consulted Glucose 209 on admission, patient was given 4x sugar pills at time of LOC in wound care  Continue home insulin pump  Diabetic NP Pat Weil consulted Glucose 209 on admission, patient was given 4x sugar pills at time of LOC in wound care  Continue home insulin pump  Diabetic NP Pat Weil consulted due to insulin pump

## 2022-08-12 NOTE — ADDENDUM
[FreeTextEntry1] : PT ARRIVED A&OX3 \par ALL VITALS WITHIN PARAMETERS FOR HBOT WITH THE EXCEPTION OF BP\par PT PROVIDED A MOMENT OF REST PRIOR TO RETEST\par RETEST FOUND TO BE WITHIN ACCEPTABLE PARAMETERS FOR HBOT \par NO DRAINAGE NOTED ON DRESSING PRE HBOT \par PT PRE-DIVE CHECKLIST SIGNED AND WITNESSED BY CHT\par PT DESCENDED TO 2.4 DEEPAK @ 2.2 PSI/MIN IN CHAMBER #1  WITHOUT INCIDENT\par PT RESTING AT TX DEPTH WITH VISIBLE CHEST RISE AND FALL OBSERVED CHAMBER SIDE\par PT TOLERATED AIR BREAKS WELL\par \par WITH APPROXIMATELY 11 MINUTES REMAINING IN PATIENTS TREATMENT, HT WITNESSED ONSET OF PT SEIZURE\par \par CHT, RN, AND DPM NOTIFIED IMMEDIATELY, RAPID RESPONSE CALLED \par PT PRESENTED WITH LEFT ARM CONVULSIONS\par PT SEIZURE LASTED ABOUT 15 SECONDS, AND PT BECAME RESPONSIVE ABOUT 6 SECONDS LATER\par \par PT DEMONSTRATED ABILITY TO FOLLOW DIRECTIONS AND WAS TOLD TO PLACE HIS AIR MASK ON HIS FACE IMMEDIATELY\par PT ASCENT @ 3 DEEPAK STARTED AS PER DPM ORDER, WITH PHYSICIAN, RN, CHT, AND HT PRESENT CHAMBER SIDE \par \par SIMULTANEOUSLY, PT BEGAN REACHING FOR HIS EMERGENCY GLUCOSE TABLETS\par PT SUCCESSFULLY BEGAN TAKING 1 OF 4 EMERGENCY GLUCOSE TABLET, AND FOLLOWED INSTRUCTION TO TURN ON HIS SIDE WHILE CHEWING AND PLACE THE AIR MASK OVER HIS FACE\par \par PT CONTINUED TO TAKE THE REMAINDER OF HIS EMERGENCY GLUCOSE TABLETS PLACE WHILE PLACING HIS AIR MASK OVER HIS FACE\par \par PT ASCENT TOOK APPROXIMATELY 5 MINUTES\par PT VITALS AND BGL TAKEN IMMEDIATELY, PT RESPONSIVE TO RAPID RESPONSE STAFF AND FOLLOWING ALL ORDERS AND COMMANDS. \par \par PT STRIPPED OF ALL HBO CLOTHING AND REPLACED WITH NEW GARMENTS\par PT TRANSFERRED TO THE [facility] EMERGENCY DEPARTMENT STAFF FOR FURTHER EVALUATION\par

## 2022-08-12 NOTE — H&P ADULT - HISTORY OF PRESENT ILLNESS
The patient is a 55 y/o M with pmhx HTN, HLD, T1DM (on insulin pump), CKD (s/p renal transplant), CVA (with residual L sided temperature and pain loss, R eye lid drooping), hx DVT (s/p Eliquis), CARMEN (on nocturnal CPAP), hx restrictive lung disease, squamous cell carcinoma OM L 4th digit s/p amputation, R 2nd digit toe amputation, R 5th digit amputation -     ED course  T 98.3F, HR 90, /98, RR 18, SpO2 98% r/a  Labs significant for trop 19 -> 20, Glu 209,   S/p -  EKG  Imaging  -CXR: increasing R effusion  -CT head: no acute pathology     The patient is a 57 y/o M with pmhx HTN, HLD, T1DM (on insulin pump), CKD (s/p renal transplant), CVA (with residual L sided temperature and pain loss, R eye lid drooping), hx DVT (s/p Eliquis), CARMEN (on nocturnal CPAP), hx restrictive lung disease, squamous cell carcinoma OM L 4th digit s/p amputation, R 2nd digit toe amputation, R 5th digit amputation - who presents to the ED from wound care with an episode of loss of concioussness while recieving hyperbaric oxygen treatment. The patient states that he does not remember losing conciousness, only remembers waking up and being told to have sugar pills. He states this has never happened to him before. Of note, patient was supposed to fly to [facility]. Upon being told he has a R pleural effusion, patient states that he has had one pleural effusion before, which was drained and produced 600cc of fluid 2 months ago. He recalls being told that the workup of the fluid showed no abnormalities.     ED course  T 98.3F, HR 90, /98, RR 18, SpO2 98% r/a  Labs significant for trop 19 -> 20, Glu 209,   S/p -  EKG  Imaging  -CXR: increasing R effusion  -CT head: no acute pathology     The patient is a 55 y/o M with pmhx HTN, HLD, T1DM (on insulin pump), CKD (s/p renal transplant), CVA (with residual L sided temperature and pain loss, R eye lid drooping), hx DVT (s/p Eliquis), CARMEN (on nocturnal CPAP), hx restrictive lung disease, squamous cell carcinoma OM L 4th digit s/p amputation, R 2nd digit toe amputation, R 5th digit amputation - who presents to the ED from wound care with an episode of loss of concioussness while recieving hyperbaric oxygen treatment. The patient states that he does not remember losing conciousness, only remembers waking up and being told to have sugar pills. He states this has never happened to him before. Of note, patient was supposed to fly to [facility]. Upon being told he has a R pleural effusion, patient states that he has had one pleural effusion before, which was drained and produced 600cc of fluid 2 months ago. He recalls being told that the workup of the fluid showed no abnormalities.     ED course  T 98.3F, HR 90, /98, RR 18, SpO2 98% r/a  Labs significant for trop 19 -> 20, Glu 209  EKG  Imaging  -CXR: increasing R effusion  -CT head: no acute pathology     The patient is a 55 y/o M with pmhx HTN, HLD, T1DM (on insulin pump), CKD (s/p renal transplant), CVA (with residual L sided temperature and pain loss, R eye lid drooping), hx DVT (s/p Eliquis), CARMEN (on nocturnal CPAP), hx restrictive lung disease, squamous cell carcinoma OM L 4th digit s/p amputation, R 2nd digit toe amputation, R 5th digit amputation - who presents to the ED from wound care with an episode of loss of concioussness while recieving hyperbaric oxygen treatment. The patient states that he does not remember losing conciousness, only remembers waking up and being told to have sugar pills. He states this has never happened to him before. Of note, patient was supposed to fly to [facility]. Upon being told he has a R pleural effusion, patient states that he has had one pleural effusion before, which was drained and produced 600cc of fluid 2 months ago. He recalls being told that the workup of the fluid showed no abnormalities.     ED course  T 98.3F, HR 90, /98, RR 18, SpO2 98% r/a  Labs significant for trop 19 -> 20, Glu 209  EKG 1st deg AV block with PVC, ?RBBB  Imaging  -CXR: increasing R effusion  -CT head: no acute pathology     The patient is a 57 y/o M with pmhx HTN, HLD, T1DM (on insulin pump), CKD (s/p renal transplant), CVA (with residual L sided sensation loss, R eye lid drooping), hx DVT (s/p Eliquis), September 11 related lung disease, CARMEN (on nocturnal CPAP), hx restrictive lung disease, squamous cell carcinoma OM L 4th digit s/p amputation, R 2nd digit toe amputation, R 5th digit amputation - who presents to the ED from wound care with an episode of loss of concioussness while recieving hyperbaric oxygen treatment. The patient states that he does not remember losing conciousness, only remembers waking up and being told to have sugar pills. He states this has never happened to him before. Of note, patient was supposed to fly to [facility]. Upon being told he has a R pleural effusion, patient states that he has had one pleural effusion before, which was drained and produced 600cc of fluid 2 months ago, his pulmonolgist is Dr. Boyce. He recalls being told that the workup of the fluid showed no abnormalities.     ED course  T 98.3F, HR 90, /98, RR 18, SpO2 98% r/a  Labs significant for trop 19 -> 20, Glu 209  EKG 1st deg AV block with PVC, ?RBBB  Imaging  -CXR: increasing R effusion  -CT head: no acute pathology

## 2022-08-12 NOTE — H&P ADULT - ASSESSMENT
The patient is a 57 y/o M with pmhx HTN, HLD, T1DM (on insulin pump), CKD (s/p renal transplant), CVA, hx DVT (s/p Eliquis), CARMEN (on nocturnal CPAP), hx restrictive lung disease, squamous cell carcinoma OM, multiple toe amputations, presents to the ED from wound care with an episode of loss of concousness while receiving hyperbaric oxygen treatment. Found to have R pleural effusion.  The patient is a 57 y/o M with pmhx HTN, HLD, T1DM (on insulin pump), CKD (s/p renal transplant), CVA, hx DVT (s/p Eliquis), CARMEN (on nocturnal CPAP), hx restrictive lung disease, squamous cell carcinoma OM, multiple toe amputations, presents to the ED from wound care with an episode of loss of concousness while receiving hyperbaric oxygen treatment. Found to have R pleural effusion.

## 2022-08-12 NOTE — H&P ADULT - PROBLEM SELECTOR PLAN 1
Patient presented to ED from wound care with episode of syncope, unclear etiology  Less likely vasovagal  CT negative for acute intracranial pathology   CXR- R pleural effusion   EKG performed, f/u results   - neurology consulted  - Cardiology consulted Patient presented to ED from wound care with episode of syncope, unclear etiology  Less likely vasovagal per patient history, r/o seizure   CT negative for acute intracranial pathology   CXR- R pleural effusion   EKG performed, f/u results   - neurology consulted, appreciate recs   - Cardiology consulted Patient presented to ED from wound care with episode of syncope, unclear etiology  Less likely vasovagal per patient history, r/o seizure   CT negative for acute intracranial pathology   CXR- R pleural effusion   EKG 1st deg AV block, ?RBBB  - neurology consulted, Dr Gill, f/u recs  - Cardiology consulted, [facility], f/u recs Patient presented to ED from wound care with episode of syncope, unclear etiology  Less likely vasovagal per patient history, r/o seizure   CT negative for acute intracranial pathology   CXR- R pleural effusion   EKG 1st deg AV block, ?RBBB  - neurology consulted, Dr Gill, f/u recs  - Cardiology consulted, [facility], f/u recs  -monitor on telemetry

## 2022-08-12 NOTE — H&P ADULT - ATTENDING COMMENTS
The patient is a 57 y/o M with pmhx HTN, HLD, T1DM (on insulin pump), CKD (s/p renal transplant), CVA, hx DVT (s/p Eliquis), CARMEN (on nocturnal CPAP), hx restrictive lung disease, squamous cell carcinoma OM, multiple toe amputations, presents to the ED from wound care with an episode of loss of concousness while receiving hyperbaric oxygen treatment. Found to have R pleural effusion.     HPI as above.     Patient seen and examined at bedside.     T(C): 36.8 (08-12-22 @ 14:30), Max: 36.8 (08-12-22 @ 10:18)  HR: 78 (08-12-22 @ 14:30) (78 - 90)  BP: 148/66 (08-12-22 @ 14:30) (148/66 - 189/98)  RR: 18 (08-12-22 @ 14:30) (18 - 18)  SpO2: 97% (08-12-22 @ 14:30) (97% - 98%)  Wt(kg): --    Physical Exam:   GENERAL: well-groomed, well-developed, NAD  HEENT: head NC/AT; conjunctiva & sclera clear; hearing grossly intact, moist mucous membranes  NECK: supple, no JVD  RESPIRATORY: decreased breath sounds R lung base, no wheezing  CARDIOVASCULAR: S1&S2, RRR, no murmurs or gallops  ABDOMEN: soft, non-tender, non-distended, + Bowel sounds x4 quadrants, no guarding, rebound or rigidity  MUSCULOSKELETAL:  no clubbing, cyanosis or edema of all 4 extremities  LYMPH: no cervical lymphadenopathy  VASCULAR: Radial pulses 2+ bilaterally, no varicose veins   SKIN: warm and dry, color normal  NEUROLOGIC: AA&O X3, moving all extremities  Psych: Normal mood and affect, normal behavior    Plan:   R plueral effusion: etiology unclear, could be related to his underlying 9/11 related lung disease.   -pulm consulted  -may require thoracentesis  -continuous pulse ox  -f/u CT chest    Syncope: etilogy likely vaso vagal  -monitor on tele  -f/u cardio and neuro rec's    Hx of renal transplant: continue azathiprine, tacrolimus  -doses confirmed  -nephro consulted.     CARMEN: CPAP as per pulm    DM2: continue insulin pump.   -accuchks as ordered.     dvt ppx: heparin The patient is a 57 y/o M with pmhx HTN, HLD, T1DM (on insulin pump), CKD (s/p renal transplant), CVA, hx DVT (s/p Eliquis), CARMEN (on nocturnal CPAP), hx restrictive lung disease, squamous cell carcinoma OM, multiple toe amputations, presents to the ED from wound care with an episode of loss of concousness while receiving hyperbaric oxygen treatment. Found to have R pleural effusion.     HPI as above.     Patient seen and examined at bedside.     T(C): 36.8 (08-12-22 @ 14:30), Max: 36.8 (08-12-22 @ 10:18)  HR: 78 (08-12-22 @ 14:30) (78 - 90)  BP: 148/66 (08-12-22 @ 14:30) (148/66 - 189/98)  RR: 18 (08-12-22 @ 14:30) (18 - 18)  SpO2: 97% (08-12-22 @ 14:30) (97% - 98%)  Wt(kg): --    Physical Exam:   GENERAL: well-groomed, well-developed, NAD  HEENT: head NC/AT; conjunctiva & sclera clear; hearing grossly intact, moist mucous membranes  NECK: supple, no JVD  RESPIRATORY: decreased breath sounds R lung base, no wheezing  CARDIOVASCULAR: S1&S2, RRR, no murmurs or gallops  ABDOMEN: soft, non-tender, non-distended, + Bowel sounds x4 quadrants, no guarding, rebound or rigidity  MUSCULOSKELETAL:  no clubbing, cyanosis or edema of all 4 extremities  LYMPH: no cervical lymphadenopathy  VASCULAR: Radial pulses 2+ bilaterally, no varicose veins   SKIN: warm and dry, color normal  NEUROLOGIC: AA&O X3, moving all extremities  Psych: Normal mood and affect, normal behavior    Plan:   R plueral effusion: etiology unclear, could be related to his underlying 9/11 related lung disease.   -pulm consulted  -may require thoracentesis  -continuous pulse ox  -f/u CT chest    Syncope: etilogy likely vaso vagal  -monitor on tele  -f/u cardio and neuro rec's    Hx of renal transplant: continue azathiprine, tacrolimus  -doses confirmed  -nephro consulted.     CARMEN: CPAP as per pulm    DM1: continue insulin pump.   -accuchks as ordered.   -Craig Perlman consulted  -he may use his own insulin pump, has enough insulin in it until tomorrow.     dvt ppx: heparin

## 2022-08-12 NOTE — H&P ADULT - PROBLEM SELECTOR PLAN 3
Patient found to have BP of 189/98 on admission Patient found to have BP of 189/98 on admission  CT head negative for acute intracranial pathology  Continue home BP meds  Cardiology following Patient found to have BP of 189/98 on admission  CT head negative for acute intracranial pathology  Continue home aspirin 81mg po qd, clonidine 0.1mg po bid, losartan 25mg po qd, metoprolol tartrate 25mg po tid, hydralazine 50mg po bid with hold parameters   Cardiology following, [facility] group, f/u recs Patient found to have BP of 189/98 on admission  -now RESOLVED.  CT head negative for acute intracranial pathology  Continue home aspirin 81mg po qd, clonidine 0.1mg po bid, losartan 25mg po qd, metoprolol tartrate 25mg po tid, hydralazine 50mg po bid with hold parameters   Cardiology following, [facility] group, f/u recs

## 2022-08-13 DIAGNOSIS — Z89.422 ACQUIRED ABSENCE OF OTHER LEFT TOE(S): ICD-10-CM

## 2022-08-13 DIAGNOSIS — E11.621 TYPE 2 DIABETES MELLITUS WITH FOOT ULCER: ICD-10-CM

## 2022-08-13 DIAGNOSIS — Z89.421 ACQUIRED ABSENCE OF OTHER RIGHT TOE(S): ICD-10-CM

## 2022-08-13 DIAGNOSIS — L84 CORNS AND CALLOSITIES: ICD-10-CM

## 2022-08-13 DIAGNOSIS — S91.301A UNSPECIFIED OPEN WOUND, RIGHT FOOT, INITIAL ENCOUNTER: ICD-10-CM

## 2022-08-13 DIAGNOSIS — Z86.718 PERSONAL HISTORY OF OTHER VENOUS THROMBOSIS AND EMBOLISM: ICD-10-CM

## 2022-08-13 DIAGNOSIS — Z80.3 FAMILY HISTORY OF MALIGNANT NEOPLASM OF BREAST: ICD-10-CM

## 2022-08-13 DIAGNOSIS — Z83.3 FAMILY HISTORY OF DIABETES MELLITUS: ICD-10-CM

## 2022-08-13 DIAGNOSIS — E11.51 TYPE 2 DIABETES MELLITUS WITH DIABETIC PERIPHERAL ANGIOPATHY WITHOUT GANGRENE: ICD-10-CM

## 2022-08-13 DIAGNOSIS — L97.412 NON-PRESSURE CHRONIC ULCER OF RIGHT HEEL AND MIDFOOT WITH FAT LAYER EXPOSED: ICD-10-CM

## 2022-08-13 DIAGNOSIS — R56.9 UNSPECIFIED CONVULSIONS: ICD-10-CM

## 2022-08-13 DIAGNOSIS — G62.9 POLYNEUROPATHY, UNSPECIFIED: ICD-10-CM

## 2022-08-13 DIAGNOSIS — Z79.4 LONG TERM (CURRENT) USE OF INSULIN: ICD-10-CM

## 2022-08-13 DIAGNOSIS — E78.5 HYPERLIPIDEMIA, UNSPECIFIED: ICD-10-CM

## 2022-08-13 DIAGNOSIS — E11.610 TYPE 2 DIABETES MELLITUS WITH DIABETIC NEUROPATHIC ARTHROPATHY: ICD-10-CM

## 2022-08-13 DIAGNOSIS — Z79.82 LONG TERM (CURRENT) USE OF ASPIRIN: ICD-10-CM

## 2022-08-13 DIAGNOSIS — Z86.73 PERSONAL HISTORY OF TRANSIENT ISCHEMIC ATTACK (TIA), AND CEREBRAL INFARCTION WITHOUT RESIDUAL DEFICITS: ICD-10-CM

## 2022-08-13 DIAGNOSIS — Z87.81 PERSONAL HISTORY OF (HEALED) TRAUMATIC FRACTURE: ICD-10-CM

## 2022-08-13 DIAGNOSIS — G47.33 OBSTRUCTIVE SLEEP APNEA (ADULT) (PEDIATRIC): ICD-10-CM

## 2022-08-13 DIAGNOSIS — Z80.8 FAMILY HISTORY OF MALIGNANT NEOPLASM OF OTHER ORGANS OR SYSTEMS: ICD-10-CM

## 2022-08-13 DIAGNOSIS — Z85.828 PERSONAL HISTORY OF OTHER MALIGNANT NEOPLASM OF SKIN: ICD-10-CM

## 2022-08-13 DIAGNOSIS — Z79.899 OTHER LONG TERM (CURRENT) DRUG THERAPY: ICD-10-CM

## 2022-08-13 DIAGNOSIS — Z94.0 KIDNEY TRANSPLANT STATUS: ICD-10-CM

## 2022-08-13 DIAGNOSIS — Z86.16 PERSONAL HISTORY OF COVID-19: ICD-10-CM

## 2022-08-13 LAB
A1C WITH ESTIMATED AVERAGE GLUCOSE RESULT: 8.6 % — HIGH (ref 4–5.6)
ANION GAP SERPL CALC-SCNC: 2 MMOL/L — LOW (ref 5–17)
BUN SERPL-MCNC: 18 MG/DL — SIGNIFICANT CHANGE UP (ref 7–23)
CALCIUM SERPL-MCNC: 9.4 MG/DL — SIGNIFICANT CHANGE UP (ref 8.5–10.1)
CHLORIDE SERPL-SCNC: 104 MMOL/L — SIGNIFICANT CHANGE UP (ref 96–108)
CHOLEST SERPL-MCNC: 134 MG/DL — SIGNIFICANT CHANGE UP
CO2 SERPL-SCNC: 35 MMOL/L — HIGH (ref 22–31)
CREAT SERPL-MCNC: 1.1 MG/DL — SIGNIFICANT CHANGE UP (ref 0.5–1.3)
EGFR: 79 ML/MIN/1.73M2 — SIGNIFICANT CHANGE UP
ESTIMATED AVERAGE GLUCOSE: 200 MG/DL — HIGH (ref 68–114)
GLUCOSE SERPL-MCNC: 125 MG/DL — HIGH (ref 70–99)
HCT VFR BLD CALC: 41.8 % — SIGNIFICANT CHANGE UP (ref 39–50)
HDLC SERPL-MCNC: 41 MG/DL — SIGNIFICANT CHANGE UP
HGB BLD-MCNC: 13.4 G/DL — SIGNIFICANT CHANGE UP (ref 13–17)
LIPID PNL WITH DIRECT LDL SERPL: 74 MG/DL — SIGNIFICANT CHANGE UP
MCHC RBC-ENTMCNC: 28.3 PG — SIGNIFICANT CHANGE UP (ref 27–34)
MCHC RBC-ENTMCNC: 32.1 GM/DL — SIGNIFICANT CHANGE UP (ref 32–36)
MCV RBC AUTO: 88.2 FL — SIGNIFICANT CHANGE UP (ref 80–100)
NON HDL CHOLESTEROL: 93 MG/DL — SIGNIFICANT CHANGE UP
NRBC # BLD: 0 /100 WBCS — SIGNIFICANT CHANGE UP (ref 0–0)
PLATELET # BLD AUTO: 208 K/UL — SIGNIFICANT CHANGE UP (ref 150–400)
POTASSIUM SERPL-MCNC: 4.6 MMOL/L — SIGNIFICANT CHANGE UP (ref 3.5–5.3)
POTASSIUM SERPL-SCNC: 4.6 MMOL/L — SIGNIFICANT CHANGE UP (ref 3.5–5.3)
RBC # BLD: 4.74 M/UL — SIGNIFICANT CHANGE UP (ref 4.2–5.8)
RBC # FLD: 13.5 % — SIGNIFICANT CHANGE UP (ref 10.3–14.5)
SODIUM SERPL-SCNC: 141 MMOL/L — SIGNIFICANT CHANGE UP (ref 135–145)
TRIGL SERPL-MCNC: 97 MG/DL — SIGNIFICANT CHANGE UP
WBC # BLD: 5.68 K/UL — SIGNIFICANT CHANGE UP (ref 3.8–10.5)
WBC # FLD AUTO: 5.68 K/UL — SIGNIFICANT CHANGE UP (ref 3.8–10.5)

## 2022-08-13 PROCEDURE — 99233 SBSQ HOSP IP/OBS HIGH 50: CPT | Mod: GC

## 2022-08-13 PROCEDURE — 99232 SBSQ HOSP IP/OBS MODERATE 35: CPT

## 2022-08-13 PROCEDURE — 99221 1ST HOSP IP/OBS SF/LOW 40: CPT

## 2022-08-13 RX ORDER — HYDRALAZINE HCL 50 MG
75 TABLET ORAL
Refills: 0 | Status: DISCONTINUED | OUTPATIENT
Start: 2022-08-13 | End: 2022-08-16

## 2022-08-13 RX ORDER — LANOLIN ALCOHOL/MO/W.PET/CERES
5 CREAM (GRAM) TOPICAL AT BEDTIME
Refills: 0 | Status: COMPLETED | OUTPATIENT
Start: 2022-08-13 | End: 2022-08-13

## 2022-08-13 RX ADMIN — Medication 1 TABLET(S): at 10:57

## 2022-08-13 RX ADMIN — ATORVASTATIN CALCIUM 10 MILLIGRAM(S): 80 TABLET, FILM COATED ORAL at 21:56

## 2022-08-13 RX ADMIN — Medication 0.1 MILLIGRAM(S): at 05:17

## 2022-08-13 RX ADMIN — Medication 325 MILLIGRAM(S): at 10:56

## 2022-08-13 RX ADMIN — HEPARIN SODIUM 5000 UNIT(S): 5000 INJECTION INTRAVENOUS; SUBCUTANEOUS at 13:11

## 2022-08-13 RX ADMIN — HEPARIN SODIUM 5000 UNIT(S): 5000 INJECTION INTRAVENOUS; SUBCUTANEOUS at 21:56

## 2022-08-13 RX ADMIN — Medication 5 MILLIGRAM(S): at 21:56

## 2022-08-13 RX ADMIN — GABAPENTIN 300 MILLIGRAM(S): 400 CAPSULE ORAL at 05:17

## 2022-08-13 RX ADMIN — AZATHIOPRINE 100 MILLIGRAM(S): 100 TABLET ORAL at 10:57

## 2022-08-13 RX ADMIN — GABAPENTIN 300 MILLIGRAM(S): 400 CAPSULE ORAL at 17:12

## 2022-08-13 RX ADMIN — Medication 50 MILLIGRAM(S): at 17:10

## 2022-08-13 RX ADMIN — LOSARTAN POTASSIUM 25 MILLIGRAM(S): 100 TABLET, FILM COATED ORAL at 05:17

## 2022-08-13 RX ADMIN — TACROLIMUS 1.5 MILLIGRAM(S): 5 CAPSULE ORAL at 09:53

## 2022-08-13 RX ADMIN — TACROLIMUS 1.5 MILLIGRAM(S): 5 CAPSULE ORAL at 21:56

## 2022-08-13 RX ADMIN — Medication 25 MILLIGRAM(S): at 13:11

## 2022-08-13 RX ADMIN — Medication 25 MILLIGRAM(S): at 21:56

## 2022-08-13 RX ADMIN — Medication 0.1 MILLIGRAM(S): at 17:10

## 2022-08-13 RX ADMIN — Medication 25 MILLIGRAM(S): at 05:17

## 2022-08-13 RX ADMIN — Medication 50 MILLIGRAM(S): at 05:17

## 2022-08-13 RX ADMIN — HEPARIN SODIUM 5000 UNIT(S): 5000 INJECTION INTRAVENOUS; SUBCUTANEOUS at 05:17

## 2022-08-13 NOTE — PROGRESS NOTE ADULT - PROBLEM SELECTOR PLAN 2
Recurrent R pleural effusion for pleural effusion 2 months ago, 600cc drained. Pt denies history of lung malignancy ?  - CT chest showed Large right pleural effusion and atelectasis of right lower lobe.  - Plan for thoracocentesis by IR on Monday

## 2022-08-13 NOTE — PROGRESS NOTE ADULT - ASSESSMENT
The patient is a 57 y/o M with pmhx HTN, HLD, T1DM (on insulin pump), CKD (s/p renal transplant), CVA, hx DVT (s/p Eliquis), CARMEN (on nocturnal CPAP), hx restrictive lung disease, squamous cell carcinoma OM, multiple toe amputations, presents to the ED from wound care with an episode of loss of concousness while receiving hyperbaric oxygen treatment. Found to have R pleural effusion.

## 2022-08-13 NOTE — PROGRESS NOTE ADULT - SUBJECTIVE AND OBJECTIVE BOX
Neurology follow up note    LUTHER [id number]      Interval History:    Patient feels ok no new complaints.    Allergies    No Known Allergies    Intolerances        MEDICATIONS    aspirin enteric coated 325 milliGRAM(s) Oral daily  atorvastatin 10 milliGRAM(s) Oral at bedtime  azaTHIOprine 100 milliGRAM(s) Oral daily  cloNIDine 0.1 milliGRAM(s) Oral two times a day  dextrose 5%. 1000 milliLiter(s) IV Continuous <Continuous>  dextrose 5%. 1000 milliLiter(s) IV Continuous <Continuous>  dextrose 50% Injectable 25 Gram(s) IV Push once  dextrose 50% Injectable 12.5 Gram(s) IV Push once  dextrose 50% Injectable 25 Gram(s) IV Push once  dextrose Oral Gel 15 Gram(s) Oral once PRN  gabapentin 300 milliGRAM(s) Oral two times a day  glucagon  Injectable 1 milliGRAM(s) IntraMuscular once  heparin   Injectable 5000 Unit(s) SubCutaneous every 8 hours  hydrALAZINE 50 milliGRAM(s) Oral two times a day  insulin lispro (HumaLOG) Pump 1 Each SubCutaneous Continuous Pump  losartan 25 milliGRAM(s) Oral daily  metoprolol tartrate 25 milliGRAM(s) Oral three times a day  tacrolimus 1.5 milliGRAM(s) Oral every 12 hours  trimethoprim   80 mG/sulfamethoxazole 400 mG 1 Tablet(s) Oral daily          Height (cm): 185.4 (08-12 @ 10:18)  Weight (kg): 142.4 (08-12 @ 10:18)  BMI (kg/m2): 41.4 (08-12 @ 10:18)    Vital Signs Last 24 Hrs  T(C): 36.5 (13 Aug 2022 04:48), Max: 36.8 (12 Aug 2022 10:18)  T(F): 97.7 (13 Aug 2022 04:48), Max: 98.3 (12 Aug 2022 10:18)  HR: 61 (13 Aug 2022 04:48) (61 - 90)  BP: 148/72 (13 Aug 2022 04:48) (148/66 - 189/98)  BP(mean): --  RR: 18 (13 Aug 2022 04:48) (18 - 18)  SpO2: 95% (13 Aug 2022 04:48) (95% - 98%)    Parameters below as of 13 Aug 2022 04:48  Patient On (Oxygen Delivery Method): nasal cannula  O2 Flow (L/min): 2    REVIEW OF SYSTEMS:  Constitutional:  The patient denies fever, chills, or night sweats.  Head:  No headache.  Eyes:  No double vision or blurry vision.  Ears:  No ringing in his ears.  Neck:  No neck pain.  Respiratory:  No shortness of breath.  Cardiovascular:  No chest pain.  Abdomen:  No nausea, vomiting, or abdominal pain.  Extremities/Neurological:  Decreased sensation on the left side, which is not new.    PHYSICAL EXAMINATION:   HEENT:  Head:  Normocephalic, atraumatic.  Eyes:  No scleral icterus.  Ears:  Hearing bilaterally intact.  NECK:  Supple.  RESPIRATORY:  Good air entry bilaterally.  CARDIOVASCULAR:  S1 and S2 heard.  ABDOMEN:  Soft and nontender.  EXTREMITIES:  No clubbing or cyanosis were noted.      NEUROLOGIC:  The patient is awake, alert, and oriented x3.  Extraocular movements were intact.  Pupils were equal, round, and reactive bilaterally 3 mm to 2 mm.  Speech was fluent.  Smile symmetric.  Motor:  Bilateral upper 5/5, bilateral lower 4+/5.  Sensory:  Has decreased light touch to entire left side, which is not new.  The patient has problems with his right eye, which is old, not new.                LABS:  CBC Full  -  ( 13 Aug 2022 06:01 )  WBC Count : 5.68 K/uL  RBC Count : 4.74 M/uL  Hemoglobin : 13.4 g/dL  Hematocrit : 41.8 %  Platelet Count - Automated : 208 K/uL  Mean Cell Volume : 88.2 fl  Mean Cell Hemoglobin : 28.3 pg  Mean Cell Hemoglobin Concentration : 32.1 gm/dL  Auto Neutrophil # : x  Auto Lymphocyte # : x  Auto Monocyte # : x  Auto Eosinophil # : x  Auto Basophil # : x  Auto Neutrophil % : x  Auto Lymphocyte % : x  Auto Monocyte % : x  Auto Eosinophil % : x  Auto Basophil % : x      08-12    139  |  104  |  16  ----------------------------<  209<H>  4.3   |  30  |  1.00    Ca    9.3      12 Aug 2022 10:20    TPro  7.7  /  Alb  3.6  /  TBili  0.6  /  DBili  x   /  AST  16  /  ALT  16  /  AlkPhos  108  08-12    Hemoglobin A1C:     LIVER FUNCTIONS - ( 12 Aug 2022 10:20 )  Alb: 3.6 g/dL / Pro: 7.7 g/dL / ALK PHOS: 108 U/L / ALT: 16 U/L / AST: 16 U/L / GGT: x           Vitamin B12   PT/INR - ( 12 Aug 2022 10:20 )   PT: 12.4 sec;   INR: 1.06 ratio         PTT - ( 12 Aug 2022 10:20 )  PTT:29.1 sec      RADIOLOGY  ANALYSIS AND PLAN:  This is a 56-year-old with episode of loss of consciousness and shaking.  For episode of loss of consciousness and shaking, suspect most likely this was syncope with convulsion stiffening.  Episode was brief.  Upon arrival by emergency room staff, the patient's blood pressure was 95, and the patient felt woozy prior to event.  The patient has no prior aura to suggest underlying epilepsy, does not see any flashing lights, any weird smell or weird taste.  No report of tongue bite marks or loss of urine upon awakening.  No report of shaking at night.  For history of cerebrovascular accident, continue the patient on antiplatelet.  For history of neuropathy, continue the patient on gabapentin.  For diabetes, strict control of blood sugars.  Cardiology followup.  Pulmonary follow up     Greater than 45 minutes of time was spent with the patient, plan of care, reviewing data, with greater than 50% of the visit was spent counseling and/or coordinating care with multidisciplinary healthcare team

## 2022-08-13 NOTE — CONSULT NOTE ADULT - ASSESSMENT
CKD 3, h/o Kidney transplant 2013 [facility]  Diabetes, Diabetic foot ulcer  Syncope  Hypertension  large right pleural effusion    Renal indices at baseline. To continue current immuno suppressant regimen. check tacrolimus level.  Pulmonary evaluation. ? Thoracentesis.   Podiatry follow up. Monitor blood sugar levels. Insulin coverage as needed. ? Elevated bicarb secondary to CO2 retention. May need to check abg if worsening.   Monitor BP trend. Titrate BP meds as needed. Salt restriction. Will follow electrolytes and renal function trend.   Further recommendations pending clinical course. Thank you for the courtesy of this referral.

## 2022-08-13 NOTE — PROGRESS NOTE ADULT - SUBJECTIVE AND OBJECTIVE BOX
[facility] -- Luly Egan Pannella, Patel, Savella, Goodger: Office # [phone]    Follow Up:  Pleural effusion    Subjective/Observations: Patient seen and examined. Patient awake, alert, resting in bed. No complaints of chest pain, dyspnea, palpitations or dizziness. No signs of orthopnea or PND. Tolerating O2 via nasal cannula.     REVIEW OF SYSTEMS: All other review of systems are negative unless indicated above    PAST MEDICAL & SURGICAL HISTORY:  Hypertension      Hyperlipidemia      Diabetes mellitus  Type 1 on insulin pump      CKD (chronic kidney disease)  Renal Transplant 2013 at [facility]      Diabetic peripheral neuropathy      Obesity (BMI 30-39.9)      CVA (cerebral vascular accident)  Wallenberg Stroke  low L body sensation  low R face sensation  decreased peripheral vision  poor balance      Squamous cell carcinoma of skin of left ear      History of DVT (deep vein thrombosis)  s/p eliquis      Recurrent squamous cell carcinoma of skin  nose, L arm      Toe infection  2007 L 4th Toe surgery-amputation secondary to osteomyelitis      Ear disease  Left inner ear surgery, 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 1000 milliLiter(s) (100 mL/Hr) IV Continuous <Continuous>  dextrose 5%. 1000 milliLiter(s) (50 mL/Hr) IV Continuous <Continuous>  dextrose 50% Injectable 25 Gram(s) IV Push once  dextrose 50% Injectable 12.5 Gram(s) IV Push once  dextrose 50% Injectable 25 Gram(s) IV Push once  gabapentin 300 milliGRAM(s) Oral two times a day  glucagon  Injectable 1 milliGRAM(s) IntraMuscular once  heparin   Injectable 5000 Unit(s) SubCutaneous every 8 hours  hydrALAZINE 50 milliGRAM(s) Oral two times a day  insulin lispro (HumaLOG) Pump 1 Each SubCutaneous Continuous Pump  losartan 25 milliGRAM(s) Oral daily  metoprolol tartrate 25 milliGRAM(s) Oral three times a day  tacrolimus 1.5 milliGRAM(s) Oral every 12 hours  trimethoprim   80 mG/sulfamethoxazole 400 mG 1 Tablet(s) Oral daily    MEDICATIONS  (PRN):  dextrose Oral Gel 15 Gram(s) Oral once PRN Blood Glucose LESS THAN 70 milliGRAM(s)/deciliter    Allergies    No Known Allergies    Intolerances      Vital Signs Last 24 Hrs  T(C): 36.5 (13 Aug 2022 04:48), Max: 36.8 (12 Aug 2022 14:30)  T(F): 97.7 (13 Aug 2022 04:48), Max: 98.2 (12 Aug 2022 14:30)  HR: 61 (13 Aug 2022 04:48) (61 - 84)  BP: 148/72 (13 Aug 2022 04:48) (148/66 - 174/79)  BP(mean): --  RR: 18 (13 Aug 2022 04:48) (18 - 18)  SpO2: 95% (13 Aug 2022 04:48) (95% - 98%)    Parameters below as of 13 Aug 2022 04:48  Patient On (Oxygen Delivery Method): nasal cannula  O2 Flow (L/min): 2    I&O's Summary    12 Aug 2022 07:01  -  13 Aug 2022 07:00  --------------------------------------------------------  IN: 0 mL / OUT: 650 mL / NET: -650 mL          TELE: SR 60-70s   PHYSICAL EXAM:  Constitutional: NAD, awake and alert, Obese   HEENT: Moist Mucous Membranes, Anicteric  Pulmonary: Non-labored, breath sounds are clear bilaterally, Diminished No wheezing, rales or rhonchi  Cardiovascular: Regular, S1 and S2, No murmurs, No rubs, gallops or clicks  Gastrointestinal:  soft, nontender, nondistended   Lymph: trace peripheral edema. No lymphadenopathy.   Skin: No visible rashes or ulcers.  Psych:  Mood & affect appropriate      LABS: All Labs Reviewed:                        13.4   5.68  )-----------( 208      ( 13 Aug 2022 06:01 )             41.8                         13.9   6.03  )-----------( 179      ( 12 Aug 2022 10:20 )             42.5     13 Aug 2022 06:01    141    |  104    |  18     ----------------------------<  125    4.6     |  35     |  1.10   12 Aug 2022 10:20    139    |  104    |  16     ----------------------------<  209    4.3     |  30     |  1.00     Ca    9.4        13 Aug 2022 06:01  Ca    9.3        12 Aug 2022 10:20    TPro  7.7    /  Alb  3.6    /  TBili  0.6    /  DBili  x      /  AST  16     /  ALT  16     /  AlkPhos  108    12 Aug 2022 10:20   LIVER FUNCTIONS - ( 12 Aug 2022 10:20 )  Alb: 3.6 g/dL / Pro: 7.7 g/dL / ALK PHOS: 108 U/L / ALT: 16 U/L / AST: 16 U/L / GGT: x           PT/INR - ( 12 Aug 2022 10:20 )   PT: 12.4 sec;   INR: 1.06 ratio         PTT - ( 12 Aug 2022 10:20 )  PTT:29.1 secTroponin I, High Sensitivity Result: 20.1 ng/L (08-12-22 @ 12:40)  Troponin I, High Sensitivity Result: 19.0 ng/L (08-12-22 @ 10:20)        Patient name: LUTHER NORIEGA  YOB: 1965   Age: 51 (M)   MR#: [id number]  Study Date: 7/27/2017  Location: O/PSonographer: Susie Zuleta RDCS  Study quality: Technically difficult  Referring Physician: Kashmir Ochoa MD  Blood Pressure: 150/77 mmHg  Height: 185 cm  Weight: 136 kg  BSA: 2.6 m2  ------------------------------------------------------------------------  PROCEDURE: Transthoracic echocardiogram with 2-D, M-Mode  and complete spectral and color flow Doppler. Intravenous  catheter inserted. Verbal consent was obtained for  injection of echo contrast following a discussion of risks  and benefits. Following intravenous injection of contrast,  harmonic imaging was performed.  INDICATION: Primary pulmonary hypertension(I27.0)  ------------------------------------------------------------------------  Dimensions:    Normal Values:  LA:     4.0    2.0 - 4.0 cm  Ao:     3.5    2.0 - 3.8 cm  SEPTUM: 1.1    0.6 - 1.2 cm  PWT:    1.2    0.6 - 1.1 cm  LVIDd:  4.9    3.0 -5.6 cm  LVIDs:  3.3    1.8 - 4.0 cm  Derived variables:  LVMI: 83 g/m2  RWT: 0.48  Fractional short: 33 %  EF (Teicholtz): 61 %  ------------------------------------------------------------------------  Observations:  Mitral Valve: Normal mitral valve. Minimal mitral  regurgitation.  Aortic Valve/Aorta: Normal trileaflet aortic valve.  Aortic Root: 3.5 cm.  Left Atrium: Normal left atrium.  LA volume index = 20  cc/m2.  Left Ventricle: Normal left ventricular systolic function.  No segmental wallmotion abnormalities. Endocardial  visualization enhanced with intravenous injection of echo  contrast (Definity). Increased relative wall thickness with  normal left ventricular mass index, consistent with  concentric left ventricular remodeling.  Right Heart: Normal right atrium. Right ventricular  enlargement with normal right ventricular systolic  function. Normal tricuspid valve. Minimal tricuspid  regurgitation. Normal pulmonic valve.  Pericardium/Pleura: Normal pericardium with no pericardial  effusion.  Hemodynamic: Estimated right atrial pressure is 8 mm Hg.  Estimated right ventricular systolic pressure equals 29 mm  Hg, assuming right atrial pressure equals 8 mm Hg,  consistent with normal pulmonary pressures.  ------------------------------------------------------------------------  Conclusions:  1. Increased relative wall thickness with normal left  ventricular mass index, consistent with concentric left  ventricular remodeling.  2. Normal left ventricular systolic function. No segmental  wall motion abnormalities. Endocardial visualization  enhanced with intravenous injection of echo contrast  (Definity).  ------------------------------------------------------------------------  Confirmed on  7/27/2017 - 13:49:24 by RASHEED Del Cid  ------------------------------------------------------------------------

## 2022-08-13 NOTE — PROGRESS NOTE ADULT - SUBJECTIVE AND OBJECTIVE BOX
Patient is a 56y old  Male who presents with a chief complaint of LOC, R pleural effusion (13 Aug 2022 11:46)      ----  INTERVAL HPI/OVERNIGHT EVENTS: Pt seen and evaluated at the bedside. No acute overnight events occurred.     ----  PAST MEDICAL & SURGICAL HISTORY:  Hypertension      Hyperlipidemia      Diabetes mellitus  Type 1 on insulin pump      CKD (chronic kidney disease)  Renal Transplant 2013 at [facility]      Diabetic peripheral neuropathy      Obesity (BMI 30-39.9)      CVA (cerebral vascular accident)  Wallenberg Stroke  low L body sensation  low R face sensation  decreased peripheral vision  poor balance      Squamous cell carcinoma of skin of left ear      History of DVT (deep vein thrombosis)  s/p eliquis      Recurrent squamous cell carcinoma of skin  nose, L arm      Toe infection  2007 L 4th Toe surgery-amputation secondary to osteomyelitis      Ear disease  Left inner ear surgery, 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, Can NOT have MRI      Vasectomy status  s/p b/l  vasectomy      H/O foot surgery  2005 - right foot - bone fracture - s/p ORIF and subsequent removal of hardware      End-stage renal failure with renal transplant  12/2013      S/P kidney transplant      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          FAMILY HISTORY:  Family history of diabetes mellitus (DM)    FH: skin cancer        Allergies    No Known Allergies    Intolerances        ----  REVIEW OF SYSTEMS:  CONSTITUTIONAL: denies fever, chills, fatigue, weakness  CARDIOVASCULAR: denies chest pain, chest pressure, palpitations  RESPIRATORY: denies shortness of breath, sputum production  GASTROINTESTINAL: denies nausea, vomiting, diarrhea, abdominal pain  GENITOURINARY: denies dysuria, discharge  NEUROLOGICAL: denies numbness, headache, focal weakness  ENDOCRINOLOGIC: denies sweating, cold or heat intolerance    ----  PHYSICAL EXAM:  GENERAL:  no acute distress  LUNGS: decreased  air entry R base, no wheezing or rhonchi appreciated  HEART: S1/S2, regular rate and rhythm, no murmurs noted, no noted edema to b/l LE  GASTROINTESTINAL: abdomen is soft, nontender, nondistended, normoactive bowel sounds, no palpable masses  INTEGUMENT:  RLE noticed erythema and edema  NEUROLOGIC: awake, alert, oriented x3  PSYCHIATRIC: mood is good      T(C): 36.9 (08-13-22 @ 11:13), Max: 36.9 (08-13-22 @ 11:13)  HR: 65 (08-13-22 @ 11:13) (61 - 84)  BP: 152/81 (08-13-22 @ 11:13) (148/66 - 174/79)  RR: 16 (08-13-22 @ 11:13) (16 - 18)  SpO2: 95% (08-13-22 @ 11:13) (95% - 98%)  Wt(kg): --    ----  I&O's Summary    12 Aug 2022 07:01  -  13 Aug 2022 07:00  --------------------------------------------------------  IN: 0 mL / OUT: 650 mL / NET: -650 mL        LABS:                        13.4   5.68  )-----------( 208      ( 13 Aug 2022 06:01 )             41.8     08-13    141  |  104  |  18  ----------------------------<  125<H>  4.6   |  35<H>  |  1.10    Ca    9.4      13 Aug 2022 06:01    TPro  7.7  /  Alb  3.6  /  TBili  0.6  /  DBili  x   /  AST  16  /  ALT  16  /  AlkPhos  108  08-12    PT/INR - ( 12 Aug 2022 10:20 )   PT: 12.4 sec;   INR: 1.06 ratio         PTT - ( 12 Aug 2022 10:20 )  PTT:29.1 sec    CAPILLARY BLOOD GLUCOSE      POCT Blood Glucose.: 189 mg/dL (13 Aug 2022 11:46)  POCT Blood Glucose.: 117 mg/dL (13 Aug 2022 07:51)  POCT Blood Glucose.: 228 mg/dL (12 Aug 2022 22:47)  POCT Blood Glucose.: 298 mg/dL (12 Aug 2022 17:22)                ----  Personally reviewed:  Vital sign trends: [x  ] yes    [  ] no     [  ] n/a  Laboratory results: [  x] yes    [  ] no     [  ] n/a  Radiology results: [x  ] yes    [  ] no     [  ] n/a  Culture results: [ x ] yes    [  ] no     [  ] n/a  Consultant recommendations: [x  ] yes    [  ] no     [  ] n/a         Patient is a 56y old  Male who presents with a chief complaint of LOC, R pleural effusion (13 Aug 2022 11:46)    INTERVAL HPI/OVERNIGHT EVENTS: Pt seen and evaluated at the bedside. No acute overnight events occurred. , no fever , tolerating po.   ----  REVIEW OF SYSTEMS:  CONSTITUTIONAL: denies fever, chills, fatigue, weakness  CARDIOVASCULAR: denies chest pain, chest pressure, palpitations  RESPIRATORY: denies shortness of breath, sputum production  GASTROINTESTINAL: denies nausea, vomiting, diarrhea, abdominal pain  GENITOURINARY: denies dysuria, discharge  NEUROLOGICAL: denies numbness, headache, focal weakness  ENDOCRINOLOGIC: denies sweating, cold or heat intolerance    ----  PHYSICAL EXAM:  GENERAL:  no acute distress  LUNGS: decreased  air entry R base, no wheezing or rhonchi appreciated  HEART: S1/S2, regular rate and rhythm, no murmurs noted, no noted edema to b/l LE  GASTROINTESTINAL: abdomen is soft, nontender, nondistended, normoactive bowel sounds, no palpable masses  INTEGUMENT:  RLE noticed erythema and edema  NEUROLOGIC: awake, alert, oriented x3  PSYCHIATRIC: mood is good  gu intact          ----      ----            T(C): 36.9 (08-13-22 @ 11:13), Max: 36.9 (08-13-22 @ 11:13)  HR: 65 (08-13-22 @ 11:13) (61 - 84)  BP: 152/81 (08-13-22 @ 11:13) (148/66 - 174/79)  RR: 16 (08-13-22 @ 11:13) (16 - 18)  SpO2: 95% (08-13-22 @ 11:13) (95% - 98%)  Wt(kg): --    ----  I&O's Summary    12 Aug 2022 07:01  -  13 Aug 2022 07:00  --------------------------------------------------------  IN: 0 mL / OUT: 650 mL / NET: -650 mL        LABS:                        13.4   5.68  )-----------( 208      ( 13 Aug 2022 06:01 )             41.8     08-13    141  |  104  |  18  ----------------------------<  125<H>  4.6   |  35<H>  |  1.10    Ca    9.4      13 Aug 2022 06:01    TPro  7.7  /  Alb  3.6  /  TBili  0.6  /  DBili  x   /  AST  16  /  ALT  16  /  AlkPhos  108  08-12    PT/INR - ( 12 Aug 2022 10:20 )   PT: 12.4 sec;   INR: 1.06 ratio         PTT - ( 12 Aug 2022 10:20 )  PTT:29.1 sec    CAPILLARY BLOOD GLUCOSE      POCT Blood Glucose.: 189 mg/dL (13 Aug 2022 11:46)  POCT Blood Glucose.: 117 mg/dL (13 Aug 2022 07:51)  POCT Blood Glucose.: 228 mg/dL (12 Aug 2022 22:47)  POCT Blood Glucose.: 298 mg/dL (12 Aug 2022 17:22)                ----  Personally reviewed:  Vital sign trends: [x  ] yes    [  ] no     [  ] n/a  Laboratory results: [  x] yes    [  ] no     [  ] n/a  Radiology results: [x  ] yes    [  ] no     [  ] n/a  Culture results: [ x ] yes    [  ] no     [  ] n/a  Consultant recommendations: [x  ] yes    [  ] no     [  ] n/a

## 2022-08-13 NOTE — PROGRESS NOTE ADULT - ASSESSMENT
55 y/o M with pmhx HTN, HLD, T1DM (on insulin pump), CKD (s/p renal transplant), CVA (with residual L sided sensation loss, R eye lid drooping), hx DVT (s/p Eliquis), September 11 related lung disease, CARMEN (on nocturnal CPAP), hx restrictive lung disease, squamous cell carcinoma OM L 4th digit s/p amputation, R 2nd digit toe amputation, R 5th digit amputation - who presents to the ED from wound care with an episode of loss of concioussness while recieving hyperbaric oxygen     nocturnal CPAP  CARMEN management  sleep hygiene  DM care  cvs rx regimen and BP control  serial labs - renal indices   hx of renal transplant  I wayne  monitor VS and Sat  will need Pleurocentesis - Monday - with IR - Loculated right effusion   w/u in progress  Neuro follow up

## 2022-08-13 NOTE — CONSULT NOTE ADULT - SUBJECTIVE AND OBJECTIVE BOX
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 The patient states that he does not remember losing conciousness, only remembers waking up and being told to have sugar pills. He states this has never happened to him before. Of note, patient was supposed to fly to [facility]. Upon being told he has a R pleural effusion, patient states that he has had one pleural effusion before, which was drained and produced 600cc of fluid 2 months ago, his pulmonolgist is Dr. Boyce. He recalls being told that the workup of the fluid showed no abnormalities.     ED course  T 98.3F, HR 90, /98, RR 18, SpO2 98% r/a  Labs significant for trop 19 -> 20, Glu 209  EKG 1st deg AV block with PVC, ?RBBB  Imaging  -CXR: increasing R effusion  -CT head: no acute pathology     (12 Aug 2022 13:23)      PAST MEDICAL & SURGICAL HISTORY:  Hypertension      Hyperlipidemia      Diabetes mellitus  Type 1 on insulin pump      CKD (chronic kidney disease)  Renal Transplant 2013 at [facility]      Diabetic peripheral neuropathy      Obesity (BMI 30-39.9)      CVA (cerebral vascular accident)  Wallenberg Stroke  low L body sensation  low R face sensation  decreased peripheral vision  poor balance      Squamous cell carcinoma of skin of left ear      History of DVT (deep vein thrombosis)  s/p eliquis      Recurrent squamous cell carcinoma of skin  nose, L arm      Toe infection  2007 L 4th Toe surgery-amputation secondary to osteomyelitis      Ear disease  Left inner ear surgery, 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, Can NOT have MRI      Vasectomy status  s/p b/l  vasectomy      H/O foot surgery  2005 - right foot - bone fracture - s/p ORIF and subsequent removal of hardware      End-stage renal failure with renal transplant  12/2013      S/P kidney transplant      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          FAMILY HISTORY:  Family history of diabetes mellitus (DM)    FH: skin cancer          Social History:    MEDICATIONS  (STANDING):  aspirin enteric coated 325 milliGRAM(s) Oral daily  atorvastatin 10 milliGRAM(s) Oral at bedtime  azaTHIOprine 100 milliGRAM(s) Oral daily  cloNIDine 0.1 milliGRAM(s) Oral two times a day  dextrose 5%. 1000 milliLiter(s) (100 mL/Hr) IV Continuous <Continuous>  dextrose 5%. 1000 milliLiter(s) (50 mL/Hr) IV Continuous <Continuous>  dextrose 50% Injectable 25 Gram(s) IV Push once  dextrose 50% Injectable 12.5 Gram(s) IV Push once  dextrose 50% Injectable 25 Gram(s) IV Push once  gabapentin 300 milliGRAM(s) Oral two times a day  glucagon  Injectable 1 milliGRAM(s) IntraMuscular once  heparin   Injectable 5000 Unit(s) SubCutaneous every 8 hours  hydrALAZINE 50 milliGRAM(s) Oral two times a day  insulin lispro (HumaLOG) Pump 1 Each SubCutaneous Continuous Pump  losartan 25 milliGRAM(s) Oral daily  metoprolol tartrate 25 milliGRAM(s) Oral three times a day  tacrolimus 1.5 milliGRAM(s) Oral every 12 hours  trimethoprim   80 mG/sulfamethoxazole 400 mG 1 Tablet(s) Oral daily    MEDICATIONS  (PRN):  dextrose Oral Gel 15 Gram(s) Oral once PRN Blood Glucose LESS THAN 70 milliGRAM(s)/deciliter        T(C): 36.9 (08-13-22 @ 11:13), Max: 36.9 (08-13-22 @ 11:13)  HR: 65 (08-13-22 @ 11:13) (61 - 84)  BP: 152/81 (08-13-22 @ 11:13) (148/66 - 174/79)  RR: 16 (08-13-22 @ 11:13) (16 - 18)  SpO2: 95% (08-13-22 @ 11:13) (95% - 98%)  Wt(kg): --    PHYSICAL EXAM:  CHEST/LUNG: Clear to percussion bilaterally; No rales, rhonchi, wheezing, or rubs  HEART: Regular rate and rhythm; No murmurs, rubs, or gallops  ABDOMEN: Soft, Nontender, Nondistended; Bowel sounds present  EXTREMITIES:  2+ Peripheral Pulses, No clubbing, cyanosis, or edema    CAPILLARY BLOOD GLUCOSE      POCT Blood Glucose.: 189 mg/dL (13 Aug 2022 11:46)  POCT Blood Glucose.: 117 mg/dL (13 Aug 2022 07:51)  POCT Blood Glucose.: 228 mg/dL (12 Aug 2022 22:47)  POCT Blood Glucose.: 298 mg/dL (12 Aug 2022 17:22)                            13.4   5.68  )-----------( 208      ( 13 Aug 2022 06:01 )             41.8       CMP:  08-13 @ 06:01  SGPT --  Albumin --   Alk Phos --   Anion Gap 2   SGOT --   Total Bili --   BUN 18   Calcium Total 9.4   CO2 35   Chloride 104   Creatinine 1.10   eGFR if AA --   eGFR if non AA --   Glucose 125   Potassium 4.6   Protein --   Sodium 141      Thyroid Function Tests:      Diabetes Tests:       Radiology:

## 2022-08-13 NOTE — CONSULT NOTE ADULT - SUBJECTIVE AND OBJECTIVE BOX
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 Patient states that he has a wound in his right foot for 6 momths. Patient relates that he changes the right foot dressing daily at home and that he receive a hyperbaric oxygen treatment at the wound center. Patient denies history of trauma. Denies any fever, chills, nausea, vomiting, chest pain, shortness of breath, or calf pain at this time.    REVIEW OF SYSTEMS    PAST MEDICAL & SURGICAL HISTORY:  Hypertension      Hyperlipidemia      Diabetes mellitus  Type 1 on insulin pump      CKD (chronic kidney disease)  Renal Transplant 2013 at [facility]      Diabetic peripheral neuropathy      Obesity (BMI 30-39.9)      CVA (cerebral vascular accident)  Wallenberg Stroke  low L body sensation  low R face sensation  decreased peripheral vision  poor balance      Squamous cell carcinoma of skin of left ear      History of DVT (deep vein thrombosis)  s/p eliquis      Recurrent squamous cell carcinoma of skin  nose, L arm      Toe infection  2007 L 4th Toe surgery-amputation secondary to osteomyelitis      Ear disease  Left inner ear surgery, 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, Can NOT have MRI      Vasectomy status  s/p b/l  vasectomy      H/O foot surgery  2005 - right foot - bone fracture - s/p ORIF and subsequent removal of hardware      End-stage renal failure with renal transplant  12/2013      S/P kidney transplant      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          Allergies    No Known Allergies    Intolerances        MEDICATIONS  (STANDING):  aspirin enteric coated 325 milliGRAM(s) Oral daily  atorvastatin 10 milliGRAM(s) Oral at bedtime  azaTHIOprine 100 milliGRAM(s) Oral daily  cloNIDine 0.1 milliGRAM(s) Oral two times a day  dextrose 5%. 1000 milliLiter(s) (100 mL/Hr) IV Continuous <Continuous>  dextrose 5%. 1000 milliLiter(s) (50 mL/Hr) IV Continuous <Continuous>  dextrose 50% Injectable 25 Gram(s) IV Push once  dextrose 50% Injectable 12.5 Gram(s) IV Push once  dextrose 50% Injectable 25 Gram(s) IV Push once  gabapentin 300 milliGRAM(s) Oral two times a day  glucagon  Injectable 1 milliGRAM(s) IntraMuscular once  heparin   Injectable 5000 Unit(s) SubCutaneous every 8 hours  hydrALAZINE 50 milliGRAM(s) Oral two times a day  insulin lispro (HumaLOG) Pump 1 Each SubCutaneous Continuous Pump  losartan 25 milliGRAM(s) Oral daily  metoprolol tartrate 25 milliGRAM(s) Oral three times a day  tacrolimus 1.5 milliGRAM(s) Oral every 12 hours  trimethoprim   80 mG/sulfamethoxazole 400 mG 1 Tablet(s) Oral daily    MEDICATIONS  (PRN):  dextrose Oral Gel 15 Gram(s) Oral once PRN Blood Glucose LESS THAN 70 milliGRAM(s)/deciliter      Social History:  Patient does not smoke, drink, or use illicit substances. He lives at home with his wife and kids. He ambulates independently and is sexually active. (12 Aug 2022 13:23)      FAMILY HISTORY:  Family history of diabetes mellitus (DM)    FH: skin cancer        Vital Signs Last 24 Hrs  T(C): 36.9 (13 Aug 2022 11:13), Max: 36.9 (13 Aug 2022 11:13)  T(F): 98.4 (13 Aug 2022 11:13), Max: 98.4 (13 Aug 2022 11:13)  HR: 65 (13 Aug 2022 11:13) (61 - 84)  BP: 152/81 (13 Aug 2022 11:13) (148/66 - 174/79)  BP(mean): --  RR: 16 (13 Aug 2022 11:13) (16 - 18)  SpO2: 95% (13 Aug 2022 11:13) (95% - 98%)    Parameters below as of 13 Aug 2022 11:13  Patient On (Oxygen Delivery Method): nasal cannula  O2 Flow (L/min): 2      PHYSICAL EXAM:  Vascular: DP & PT palpable bilaterally, Capillary refill 3 seconds, Digital hair present bilaterally  Neurological: Light touch sensation intact bilaterally  Musculoskeletal: 5/5 strength in all quadrants bilaterally, AJ & STJ ROM intact  Dermatological: 1.6 x1.2 x 0.1 cm ulceration noted to the right plantar foot and annular 0.5 cm wound noted to the lateral aspect of right foot at the level of 5th met head both wounds has granular wound bed, no probe to bone, no periwound erythema, no fluctuance, no malodor, no proximal streaking at this time    CBC Full  -  ( 13 Aug 2022 06:01 )  WBC Count : 5.68 K/uL  RBC Count : 4.74 M/uL  Hemoglobin : 13.4 g/dL  Hematocrit : 41.8 %  Platelet Count - Automated : 208 K/uL  Mean Cell Volume : 88.2 fl  Mean Cell Hemoglobin : 28.3 pg  Mean Cell Hemoglobin Concentration : 32.1 gm/dL  Auto Neutrophil # : x  Auto Lymphocyte # : x  Auto Monocyte # : x  Auto Eosinophil # : x  Auto Basophil # : x  Auto Neutrophil % : x  Auto Lymphocyte % : x  Auto Monocyte % : x  Auto Eosinophil % : x  Auto Basophil % : x      08-13    141  |  104  |  18  ----------------------------<  125<H>  4.6   |  35<H>  |  1.10    Ca    9.4      13 Aug 2022 06:01    TPro  7.7  /  Alb  3.6  /  TBili  0.6  /  DBili  x   /  AST  16  /  ALT  16  /  AlkPhos  108  08-12      PT/INR - ( 12 Aug 2022 10:20 )   PT: 12.4 sec;   INR: 1.06 ratio         PTT - ( 12 Aug 2022 10:20 )  PTT:29.1 sec

## 2022-08-13 NOTE — CONSULT NOTE ADULT - SUBJECTIVE AND OBJECTIVE BOX
Patient is a 56y old  Male who presents with a chief complaint of LOC, R pleural effusion (13 Aug 2022 08:09)    HPI:  The patient is a 57 y/o M with pmhx HTN, HLD, T1DM (on insulin pump), CKD (s/p renal transplant), CVA (with residual L sided sensation loss, R eye lid drooping), hx DVT (s/p Eliquis), September 11 related lung disease, CARMEN (on nocturnal CPAP), hx restrictive lung disease, squamous cell carcinoma OM L 4th digit s/p amputation, R 2nd digit toe amputation, R 5th digit amputation - who presents to the ED from wound care with an episode of loss of concioussness while recieving hyperbaric oxygen treatment. The patient states that he does not remember losing conciousness, only remembers waking up and being told to have sugar pills. He states this has never happened to him before. Of note, patient was supposed to fly to [facility]. Upon being told he has a R pleural effusion, patient states that he has had one pleural effusion before, which was drained and produced 600cc of fluid 2 months ago, his pulmonolgist is Dr. Boyce. He recalls being told that the workup of the fluid showed no abnormalities.     ED course  T 98.3F, HR 90, /98, RR 18, SpO2 98% r/a  Labs significant for trop 19 -> 20, Glu 209  EKG 1st deg AV block with PVC, ?RBBB  Imaging  -CXR: increasing R effusion  -CT head: no acute pathology     (12 Aug 2022 13:23)      PAST MEDICAL HISTORY:  Hypertension    Hyperlipidemia    Diabetes mellitus    CKD (chronic kidney disease)    Diabetic peripheral neuropathy    Obesity (BMI 30-39.9)    CVA (cerebral vascular accident)    Squamous cell carcinoma of skin of left ear    History of DVT (deep vein thrombosis)    Recurrent squamous cell carcinoma of skin        PAST SURGICAL HISTORY:  Toe infection    Ear disease    Vasectomy status    H/O foot surgery    End-stage renal failure with renal transplant    S/P kidney transplant    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        FAMILY HISTORY:  Family history of diabetes mellitus (DM)    FH: skin cancer        SOCIAL HISTORY:    Allergies    No Known Allergies    Intolerances      Home Medications:  aspirin 81 mg oral tablet: 2 tab(s) orally once a day (12 Aug 2022 13:34)  azaTHIOprine 50 mg oral tablet: 2 tab(s) orally once a day (12 Aug 2022 13:34)  Bactrim 400 mg-80 mg oral tablet: 1 tab(s) orally once a day (12 Aug 2022 13:34)  cloNIDine 0.1 mg oral tablet: 1 tab(s) orally 2 times a day (12 Aug 2022 13:34)  gabapentin 300 mg oral capsule: 1 cap(s) orally 2 times a day (12 Aug 2022 13:34)  hydrALAZINE 50 mg oral tablet: 1.5 tab(s) orally 2 times a day (12 Aug 2022 13:39)  losartan 25 mg oral tablet: 1 tab(s) orally once a day (12 Aug 2022 13:34)  lovastatin 40 mg oral tablet: 1 tab(s) orally once a day (12 Aug 2022 13:34)  Metoprolol Tartrate 25 mg oral tablet: 1 tab(s) orally 3 times a day (12 Aug 2022 13:39)  tacrolimus 1 mg oral capsule: 1.5 cap(s) orally every 12 hours (12 Aug 2022 13:39)  Vitamin D3 1250 mcg (50,000 intl units) oral capsule: 1 cap(s) orally once a week (12 Aug 2022 13:39)    MEDICATIONS  (STANDING):  aspirin enteric coated 325 milliGRAM(s) Oral daily  atorvastatin 10 milliGRAM(s) Oral at bedtime  azaTHIOprine 100 milliGRAM(s) Oral daily  cloNIDine 0.1 milliGRAM(s) Oral two times a day  dextrose 5%. 1000 milliLiter(s) (100 mL/Hr) IV Continuous <Continuous>  dextrose 5%. 1000 milliLiter(s) (50 mL/Hr) IV Continuous <Continuous>  dextrose 50% Injectable 25 Gram(s) IV Push once  dextrose 50% Injectable 12.5 Gram(s) IV Push once  dextrose 50% Injectable 25 Gram(s) IV Push once  gabapentin 300 milliGRAM(s) Oral two times a day  glucagon  Injectable 1 milliGRAM(s) IntraMuscular once  heparin   Injectable 5000 Unit(s) SubCutaneous every 8 hours  hydrALAZINE 50 milliGRAM(s) Oral two times a day  insulin lispro (HumaLOG) Pump 1 Each SubCutaneous Continuous Pump  losartan 25 milliGRAM(s) Oral daily  metoprolol tartrate 25 milliGRAM(s) Oral three times a day  tacrolimus 1.5 milliGRAM(s) Oral every 12 hours  trimethoprim   80 mG/sulfamethoxazole 400 mG 1 Tablet(s) Oral daily    MEDICATIONS  (PRN):  dextrose Oral Gel 15 Gram(s) Oral once PRN Blood Glucose LESS THAN 70 milliGRAM(s)/deciliter      REVIEW OF SYSTEMS:  General:   Respiratory: No cough, SOB  Cardiovascular: No CP or Palpitations	  Gastrointestinal: No nausea, Vomiting. No diarrhea  Genitourinary: No urinary complaints	  Musculoskeletal: No leg swelling, No new rash or lesions	  Neurological: 	  all other systems negative    T(F): 97.7 (08-13-22 @ 04:48), Max: 98.3 (08-12-22 @ 10:18)  HR: 61 (08-13-22 @ 04:48) (61 - 90)  BP: 148/72 (08-13-22 @ 04:48) (148/66 - 189/98)  RR: 18 (08-13-22 @ 04:48) (18 - 18)  SpO2: 95% (08-13-22 @ 04:48) (95% - 98%)  Wt(kg): --    PHYSICAL EXAM:  General: NAD  Respiratory: b/l air entry  Cardiovascular: S1 S2  Gastrointestinal: soft  Extremities: edema        08-13    141  |  104  |  18  ----------------------------<  125<H>  4.6   |  35<H>  |  1.10    Ca    9.4      13 Aug 2022 06:01    TPro  7.7  /  Alb  3.6  /  TBili  0.6  /  DBili  x   /  AST  16  /  ALT  16  /  AlkPhos  108  08-12                          13.4   5.68  )-----------( 208      ( 13 Aug 2022 06:01 )             41.8       Potassium, Serum: 4.6 mmol/L (08-13 @ 06:01)  Blood Urea Nitrogen, Serum: 18 mg/dL (08-13 @ 06:01)  Calcium, Total Serum: 9.4 mg/dL (08-13 @ 06:01)  Hemoglobin: 13.4 g/dL (08-13 @ 06:01)      Creatinine, Serum: 1.10 (08-13 @ 06:01)  Creatinine, Serum: 1.00 (08-12 @ 10:20)        LIVER FUNCTIONS - ( 12 Aug 2022 10:20 )  Alb: 3.6 g/dL / Pro: 7.7 g/dL / ALK PHOS: 108 U/L / ALT: 16 U/L / AST: 16 U/L / GGT: x                       I&O's Detail    12 Aug 2022 07:01  -  13 Aug 2022 07:00  --------------------------------------------------------  IN:  Total IN: 0 mL    OUT:    Voided (mL): 650 mL  Total OUT: 650 mL    Total NET: -650 mL             Patient is a 56y old  Male who presents with a chief complaint of LOC, R pleural effusion (13 Aug 2022 08:09)    HPI:  The patient is a 57 y/o M with pmhx HTN, HLD, T1DM (on insulin pump), CKD (s/p renal transplant), CVA (with residual L sided sensation loss, R eye lid drooping), hx DVT (s/p Eliquis), 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 The patient states that he does not remember losing conciousness, only remembers waking up and being told to have sugar pills. He states this has never happened to him before. Of note, patient was supposed to fly to [facility]. Upon being told he has a R pleural effusion, patient states that he has had one pleural effusion before, which was drained and produced 600cc of fluid 2 months ago, his pulmonolgist is Dr. Boyce. He recalls being told that the workup of the fluid showed no abnormalities.     ED course  T 98.3F, HR 90, /98, RR 18, SpO2 98% r/a  Labs significant for trop 19 -> 20, Glu 209  EKG 1st deg AV block with PVC, ?RBBB  Imaging  -CXR: increasing R effusion  -CT head: no acute pathology     (12 Aug 2022 13:23)    Renal consult called for h/o kidney transplant. History obtained from chart and patient.       PAST MEDICAL HISTORY:  Hypertension    Hyperlipidemia    Diabetes mellitus    CKD (chronic kidney disease)    Diabetic peripheral neuropathy    Obesity (BMI 30-39.9)    CVA (cerebral vascular accident)    Squamous cell carcinoma of skin of left ear    History of DVT (deep vein thrombosis)    Recurrent squamous cell carcinoma of skin        PAST SURGICAL HISTORY:  Toe infection    Ear disease    Vasectomy status    H/O foot surgery    End-stage renal failure with renal transplant    S/P kidney transplant    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        FAMILY HISTORY:  Family history of diabetes mellitus (DM)    FH: skin cancer        SOCIAL HISTORY: No smoking or alcohol use     Allergies    No Known Allergies    Intolerances      Home Medications:  aspirin 81 mg oral tablet: 2 tab(s) orally once a day (12 Aug 2022 13:34)  azaTHIOprine 50 mg oral tablet: 2 tab(s) orally once a day (12 Aug 2022 13:34)  Bactrim 400 mg-80 mg oral tablet: 1 tab(s) orally once a day (12 Aug 2022 13:34)  cloNIDine 0.1 mg oral tablet: 1 tab(s) orally 2 times a day (12 Aug 2022 13:34)  gabapentin 300 mg oral capsule: 1 cap(s) orally 2 times a day (12 Aug 2022 13:34)  hydrALAZINE 50 mg oral tablet: 1.5 tab(s) orally 2 times a day (12 Aug 2022 13:39)  losartan 25 mg oral tablet: 1 tab(s) orally once a day (12 Aug 2022 13:34)  lovastatin 40 mg oral tablet: 1 tab(s) orally once a day (12 Aug 2022 13:34)  Metoprolol Tartrate 25 mg oral tablet: 1 tab(s) orally 3 times a day (12 Aug 2022 13:39)  tacrolimus 1 mg oral capsule: 1.5 cap(s) orally every 12 hours (12 Aug 2022 13:39)  Vitamin D3 1250 mcg (50,000 intl units) oral capsule: 1 cap(s) orally once a week (12 Aug 2022 13:39)    MEDICATIONS  (STANDING):  aspirin enteric coated 325 milliGRAM(s) Oral daily  atorvastatin 10 milliGRAM(s) Oral at bedtime  azaTHIOprine 100 milliGRAM(s) Oral daily  cloNIDine 0.1 milliGRAM(s) Oral two times a day  dextrose 5%. 1000 milliLiter(s) (100 mL/Hr) IV Continuous <Continuous>  dextrose 5%. 1000 milliLiter(s) (50 mL/Hr) IV Continuous <Continuous>  dextrose 50% Injectable 25 Gram(s) IV Push once  dextrose 50% Injectable 12.5 Gram(s) IV Push once  dextrose 50% Injectable 25 Gram(s) IV Push once  gabapentin 300 milliGRAM(s) Oral two times a day  glucagon  Injectable 1 milliGRAM(s) IntraMuscular once  heparin   Injectable 5000 Unit(s) SubCutaneous every 8 hours  hydrALAZINE 50 milliGRAM(s) Oral two times a day  insulin lispro (HumaLOG) Pump 1 Each SubCutaneous Continuous Pump  losartan 25 milliGRAM(s) Oral daily  metoprolol tartrate 25 milliGRAM(s) Oral three times a day  tacrolimus 1.5 milliGRAM(s) Oral every 12 hours  trimethoprim   80 mG/sulfamethoxazole 400 mG 1 Tablet(s) Oral daily    MEDICATIONS  (PRN):  dextrose Oral Gel 15 Gram(s) Oral once PRN Blood Glucose LESS THAN 70 milliGRAM(s)/deciliter      REVIEW OF SYSTEMS:  General: no distress  Respiratory: No cough, SOB  Cardiovascular: No CP or Palpitations	  Gastrointestinal: No nausea, Vomiting. No diarrhea  Genitourinary: No urinary complaints	  Musculoskeletal: No new rash or lesions		  all other systems negative    T(F): 97.7 (08-13-22 @ 04:48), Max: 98.3 (08-12-22 @ 10:18)  HR: 61 (08-13-22 @ 04:48) (61 - 90)  BP: 148/72 (08-13-22 @ 04:48) (148/66 - 189/98)  RR: 18 (08-13-22 @ 04:48) (18 - 18)  SpO2: 95% (08-13-22 @ 04:48) (95% - 98%)  Wt(kg): --    PHYSICAL EXAM:  General: NAD  Respiratory: b/l air entry  Cardiovascular: S1 S2  Gastrointestinal: soft  Extremities: edema, foot dressing        08-13    141  |  104  |  18  ----------------------------<  125<H>  4.6   |  35<H>  |  1.10    Ca    9.4      13 Aug 2022 06:01    TPro  7.7  /  Alb  3.6  /  TBili  0.6  /  DBili  x   /  AST  16  /  ALT  16  /  AlkPhos  108  08-12                          13.4   5.68  )-----------( 208      ( 13 Aug 2022 06:01 )             41.8       Potassium, Serum: 4.6 mmol/L (08-13 @ 06:01)  Blood Urea Nitrogen, Serum: 18 mg/dL (08-13 @ 06:01)  Calcium, Total Serum: 9.4 mg/dL (08-13 @ 06:01)  Hemoglobin: 13.4 g/dL (08-13 @ 06:01)      Creatinine, Serum: 1.10 (08-13 @ 06:01)  Creatinine, Serum: 1.00 (08-12 @ 10:20)        LIVER FUNCTIONS - ( 12 Aug 2022 10:20 )  Alb: 3.6 g/dL / Pro: 7.7 g/dL / ALK PHOS: 108 U/L / ALT: 16 U/L / AST: 16 U/L / GGT: x                       I&O's Detail    12 Aug 2022 07:01  -  13 Aug 2022 07:00  --------------------------------------------------------  IN:  Total IN: 0 mL    OUT:    Voided (mL): 650 mL  Total OUT: 650 mL    Total NET: -650 mL    < from: CT Chest No Cont (08.12.22 @ 16:54) >    ACC: [id number] EXAM:  CT CHEST                          PROCEDURE DATE:  08/12/2022          INTERPRETATION:  INDICATION: pleural effusion  TECHNIQUE: Unenhanced CT of the chest. Coronal, sagittal, and MIP images   were reconstructed and reviewed.  COMPARISON: 2/8/2022 chest CT.    FINDINGS:    AIRWAYS, LUNGS and PLEURA: Patent central airways. Large right pleural   effusion and atelectasis of right lower lobe. The lungs are otherwise   clear.    MEDIASTINUM AND JES: No lymphadenopathy.    HEART AND VESSELS: Heart size is normal. No pericardial effusion.   Thoracic aorta and pulmonary artery normal in diameter. Mild coronary   calcification.    VISUALIZED UPPER ABDOMEN: Lateral renal atrophy.    CHEST WALL AND BONES: No aggressive osseous lesion. Subcutaneous loop   recorder.    LOWER NECK: Within normal limits.    IMPRESSION:    Large right pleural effusion and atelectasis of right lower lobe.    --- End of Report ---            TRI KOCH MD; Attending Radiologist  This document has been electronically signed. Aug 12 2022  5:01PM    < end of copied text >  < from: Xray Chest 1 View- PORTABLE-Urgent (08.12.22 @ 11:19) >    ACC: [id number] EXAM:  XR CHEST PORTABLE URGENT 1V                          PROCEDURE DATE:  08/12/2022          INTERPRETATION:  AP chest on August 12, 2022 at 11:04 AM. Patient had a   syncopal episode.    Heart normal for projection. Left loop recorder again noted.    On May 10 there is a slight right base effusion.    There is presently a moderate to early large right effusion.    IMPRESSION: Increasing right effusion.    --- End of Report ---            YURIY JONES MD; Attending Radiologist  This document has been electronically signed. Aug 12 2022 12:06PM    < end of copied text >

## 2022-08-13 NOTE — PROGRESS NOTE ADULT - SUBJECTIVE AND OBJECTIVE BOX
Date/Time Patient Seen:  		  Referring MD:   Data Reviewed	       Patient is a 56y old  Male who presents with a chief complaint of LOC, R pleural effusion (12 Aug 2022 17:36)      Subjective/HPI     PAST MEDICAL & SURGICAL HISTORY:  Hypertension    Hyperlipidemia    Diabetes mellitus  Type 1 on insulin pump    CKD (chronic kidney disease)  Renal Transplant 2013 at [facility]    Diabetic peripheral neuropathy    Obesity (BMI 30-39.9)    CVA (cerebral vascular accident)  Wallenberg Stroke  low L body sensation  low R face sensation  decreased peripheral vision  poor balance    Squamous cell carcinoma of skin of left ear    History of DVT (deep vein thrombosis)  s/p eliquis    Recurrent squamous cell carcinoma of skin  nose, L arm    Toe infection  2007 L 4th Toe surgery-amputation secondary to osteomyelitis    Ear disease  Left inner ear surgery, 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 1000 milliLiter(s) (100 mL/Hr) IV Continuous <Continuous>  dextrose 5%. 1000 milliLiter(s) (50 mL/Hr) IV Continuous <Continuous>  dextrose 50% Injectable 25 Gram(s) IV Push once  dextrose 50% Injectable 12.5 Gram(s) IV Push once  dextrose 50% Injectable 25 Gram(s) IV Push once  gabapentin 300 milliGRAM(s) Oral two times a day  glucagon  Injectable 1 milliGRAM(s) IntraMuscular once  heparin   Injectable 5000 Unit(s) SubCutaneous every 8 hours  hydrALAZINE 50 milliGRAM(s) Oral two times a day  insulin lispro (HumaLOG) Pump 1 Each SubCutaneous Continuous Pump  losartan 25 milliGRAM(s) Oral daily  metoprolol tartrate 25 milliGRAM(s) Oral three times a day  tacrolimus 1.5 milliGRAM(s) Oral every 12 hours  trimethoprim   80 mG/sulfamethoxazole 400 mG 1 Tablet(s) Oral daily    MEDICATIONS  (PRN):  dextrose Oral Gel 15 Gram(s) Oral once PRN Blood Glucose LESS THAN 70 milliGRAM(s)/deciliter         Vitals log        ICU Vital Signs Last 24 Hrs  T(C): 36.5 (13 Aug 2022 04:48), Max: 36.8 (12 Aug 2022 10:18)  T(F): 97.7 (13 Aug 2022 04:48), Max: 98.3 (12 Aug 2022 10:18)  HR: 61 (13 Aug 2022 04:48) (61 - 90)  BP: 148/72 (13 Aug 2022 04:48) (148/66 - 189/98)  BP(mean): --  ABP: --  ABP(mean): --  RR: 18 (13 Aug 2022 04:48) (18 - 18)  SpO2: 95% (13 Aug 2022 04:48) (95% - 98%)    O2 Parameters below as of 13 Aug 2022 04:48  Patient On (Oxygen Delivery Method): nasal cannula  O2 Flow (L/min): 2               Input and Output:  I&O's Detail    12 Aug 2022 07:01  -  13 Aug 2022 07:00  --------------------------------------------------------  IN:  Total IN: 0 mL    OUT:    Voided (mL): 650 mL  Total OUT: 650 mL    Total NET: -650 mL          Lab Data                        13.9   6.03  )-----------( 179      ( 12 Aug 2022 10:20 )             42.5     08-12    139  |  104  |  16  ----------------------------<  209<H>  4.3   |  30  |  1.00    Ca    9.3      12 Aug 2022 10:20    TPro  7.7  /  Alb  3.6  /  TBili  0.6  /  DBili  x   /  AST  16  /  ALT  16  /  AlkPhos  108  08-12            Review of Systems	      Objective     Physical Examination    heart s1s2  lung dec BS  abd soft      Pertinent Lab findings & Imaging      Eli:  NO   Adequate UO     I&O's Detail    12 Aug 2022 07:01  -  13 Aug 2022 07:00  --------------------------------------------------------  IN:  Total IN: 0 mL    OUT:    Voided (mL): 650 mL  Total OUT: 650 mL    Total NET: -650 mL               Discussed with:     Cultures:	        Radiology

## 2022-08-14 ENCOUNTER — TRANSCRIPTION ENCOUNTER (OUTPATIENT)
Age: 57
End: 2022-08-14

## 2022-08-14 LAB
ANION GAP SERPL CALC-SCNC: 3 MMOL/L — LOW (ref 5–17)
BASOPHILS # BLD AUTO: 0.05 K/UL — SIGNIFICANT CHANGE UP (ref 0–0.2)
BASOPHILS NFR BLD AUTO: 0.8 % — SIGNIFICANT CHANGE UP (ref 0–2)
BUN SERPL-MCNC: 17 MG/DL — SIGNIFICANT CHANGE UP (ref 7–23)
CALCIUM SERPL-MCNC: 9.4 MG/DL — SIGNIFICANT CHANGE UP (ref 8.5–10.1)
CHLORIDE SERPL-SCNC: 102 MMOL/L — SIGNIFICANT CHANGE UP (ref 96–108)
CO2 SERPL-SCNC: 36 MMOL/L — HIGH (ref 22–31)
CREAT SERPL-MCNC: 1 MG/DL — SIGNIFICANT CHANGE UP (ref 0.5–1.3)
EGFR: 89 ML/MIN/1.73M2 — SIGNIFICANT CHANGE UP
EOSINOPHIL # BLD AUTO: 0.19 K/UL — SIGNIFICANT CHANGE UP (ref 0–0.5)
EOSINOPHIL NFR BLD AUTO: 2.9 % — SIGNIFICANT CHANGE UP (ref 0–6)
GLUCOSE SERPL-MCNC: 111 MG/DL — HIGH (ref 70–99)
HCT VFR BLD CALC: 45.9 % — SIGNIFICANT CHANGE UP (ref 39–50)
HGB BLD-MCNC: 14.5 G/DL — SIGNIFICANT CHANGE UP (ref 13–17)
IMM GRANULOCYTES NFR BLD AUTO: 0.3 % — SIGNIFICANT CHANGE UP (ref 0–1.5)
LYMPHOCYTES # BLD AUTO: 1 K/UL — SIGNIFICANT CHANGE UP (ref 1–3.3)
LYMPHOCYTES # BLD AUTO: 15.5 % — SIGNIFICANT CHANGE UP (ref 13–44)
MCHC RBC-ENTMCNC: 28.3 PG — SIGNIFICANT CHANGE UP (ref 27–34)
MCHC RBC-ENTMCNC: 31.6 GM/DL — LOW (ref 32–36)
MCV RBC AUTO: 89.6 FL — SIGNIFICANT CHANGE UP (ref 80–100)
MONOCYTES # BLD AUTO: 0.6 K/UL — SIGNIFICANT CHANGE UP (ref 0–0.9)
MONOCYTES NFR BLD AUTO: 9.3 % — SIGNIFICANT CHANGE UP (ref 2–14)
NEUTROPHILS # BLD AUTO: 4.6 K/UL — SIGNIFICANT CHANGE UP (ref 1.8–7.4)
NEUTROPHILS NFR BLD AUTO: 71.2 % — SIGNIFICANT CHANGE UP (ref 43–77)
NRBC # BLD: 0 /100 WBCS — SIGNIFICANT CHANGE UP (ref 0–0)
PLATELET # BLD AUTO: 227 K/UL — SIGNIFICANT CHANGE UP (ref 150–400)
POTASSIUM SERPL-MCNC: 4.6 MMOL/L — SIGNIFICANT CHANGE UP (ref 3.5–5.3)
POTASSIUM SERPL-SCNC: 4.6 MMOL/L — SIGNIFICANT CHANGE UP (ref 3.5–5.3)
RBC # BLD: 5.12 M/UL — SIGNIFICANT CHANGE UP (ref 4.2–5.8)
RBC # FLD: 13.2 % — SIGNIFICANT CHANGE UP (ref 10.3–14.5)
SODIUM SERPL-SCNC: 141 MMOL/L — SIGNIFICANT CHANGE UP (ref 135–145)
TACROLIMUS SERPL-MCNC: 7.6 NG/ML — SIGNIFICANT CHANGE UP
WBC # BLD: 6.46 K/UL — SIGNIFICANT CHANGE UP (ref 3.8–10.5)
WBC # FLD AUTO: 6.46 K/UL — SIGNIFICANT CHANGE UP (ref 3.8–10.5)

## 2022-08-14 PROCEDURE — 99232 SBSQ HOSP IP/OBS MODERATE 35: CPT

## 2022-08-14 PROCEDURE — 99233 SBSQ HOSP IP/OBS HIGH 50: CPT | Mod: GC

## 2022-08-14 RX ORDER — LOSARTAN POTASSIUM 100 MG/1
50 TABLET, FILM COATED ORAL DAILY
Refills: 0 | Status: DISCONTINUED | OUTPATIENT
Start: 2022-08-14 | End: 2022-08-16

## 2022-08-14 RX ORDER — HEPARIN SODIUM 5000 [USP'U]/ML
5000 INJECTION INTRAVENOUS; SUBCUTANEOUS ONCE
Refills: 0 | Status: COMPLETED | OUTPATIENT
Start: 2022-08-14 | End: 2022-08-14

## 2022-08-14 RX ORDER — LANOLIN ALCOHOL/MO/W.PET/CERES
3 CREAM (GRAM) TOPICAL AT BEDTIME
Refills: 0 | Status: DISCONTINUED | OUTPATIENT
Start: 2022-08-14 | End: 2022-08-16

## 2022-08-14 RX ADMIN — ATORVASTATIN CALCIUM 10 MILLIGRAM(S): 80 TABLET, FILM COATED ORAL at 21:49

## 2022-08-14 RX ADMIN — TACROLIMUS 1.5 MILLIGRAM(S): 5 CAPSULE ORAL at 09:22

## 2022-08-14 RX ADMIN — Medication 25 MILLIGRAM(S): at 06:11

## 2022-08-14 RX ADMIN — Medication 25 MILLIGRAM(S): at 13:25

## 2022-08-14 RX ADMIN — HEPARIN SODIUM 5000 UNIT(S): 5000 INJECTION INTRAVENOUS; SUBCUTANEOUS at 13:26

## 2022-08-14 RX ADMIN — LOSARTAN POTASSIUM 25 MILLIGRAM(S): 100 TABLET, FILM COATED ORAL at 06:11

## 2022-08-14 RX ADMIN — GABAPENTIN 300 MILLIGRAM(S): 400 CAPSULE ORAL at 06:10

## 2022-08-14 RX ADMIN — TACROLIMUS 1.5 MILLIGRAM(S): 5 CAPSULE ORAL at 21:50

## 2022-08-14 RX ADMIN — Medication 75 MILLIGRAM(S): at 06:10

## 2022-08-14 RX ADMIN — Medication 25 MILLIGRAM(S): at 21:49

## 2022-08-14 RX ADMIN — GABAPENTIN 300 MILLIGRAM(S): 400 CAPSULE ORAL at 17:05

## 2022-08-14 RX ADMIN — AZATHIOPRINE 100 MILLIGRAM(S): 100 TABLET ORAL at 11:11

## 2022-08-14 RX ADMIN — HEPARIN SODIUM 5000 UNIT(S): 5000 INJECTION INTRAVENOUS; SUBCUTANEOUS at 21:49

## 2022-08-14 RX ADMIN — Medication 75 MILLIGRAM(S): at 17:06

## 2022-08-14 RX ADMIN — Medication 0.1 MILLIGRAM(S): at 17:06

## 2022-08-14 RX ADMIN — Medication 325 MILLIGRAM(S): at 11:11

## 2022-08-14 RX ADMIN — Medication 1 TABLET(S): at 11:11

## 2022-08-14 RX ADMIN — Medication 3 MILLIGRAM(S): at 21:49

## 2022-08-14 RX ADMIN — HEPARIN SODIUM 5000 UNIT(S): 5000 INJECTION INTRAVENOUS; SUBCUTANEOUS at 06:11

## 2022-08-14 RX ADMIN — LOSARTAN POTASSIUM 50 MILLIGRAM(S): 100 TABLET, FILM COATED ORAL at 14:55

## 2022-08-14 RX ADMIN — Medication 0.1 MILLIGRAM(S): at 06:11

## 2022-08-14 NOTE — DISCHARGE NOTE PROVIDER - NSDCMRMEDTOKEN_GEN_ALL_CORE_FT
aspirin 81 mg oral tablet: 2 tab(s) orally once a day  azaTHIOprine 50 mg oral tablet: 2 tab(s) orally once a day  Bactrim 400 mg-80 mg oral tablet: 1 tab(s) orally once a day  cloNIDine 0.1 mg oral tablet: 1 tab(s) orally 2 times a day  gabapentin 300 mg oral capsule: 1 cap(s) orally 2 times a day  hydrALAZINE 50 mg oral tablet: 1.5 tab(s) orally 2 times a day  losartan 25 mg oral tablet: 1 tab(s) orally once a day  lovastatin 40 mg oral tablet: 1 tab(s) orally once a day  Metoprolol Tartrate 25 mg oral tablet: 1 tab(s) orally 3 times a day  tacrolimus 1 mg oral capsule: 1.5 cap(s) orally every 12 hours  Vitamin D3 1250 mcg (50,000 intl units) oral capsule: 1 cap(s) orally once a week   aspirin 81 mg oral tablet: 2 tab(s) orally once a day  azaTHIOprine 50 mg oral tablet: 2 tab(s) orally once a day  Bactrim 400 mg-80 mg oral tablet: 1 tab(s) orally once a day  cloNIDine 0.1 mg oral tablet: 1 tab(s) orally 2 times a day  gabapentin 300 mg oral capsule: 1 cap(s) orally 2 times a day  hydrALAZINE 25 mg oral tablet: 3 tab(s) orally 2 times a day  losartan 50 mg oral tablet: 1 tab(s) orally once a day  lovastatin 40 mg oral tablet: 1 tab(s) orally once a day  Metoprolol Tartrate 25 mg oral tablet: 1 tab(s) orally 3 times a day  tacrolimus 1 mg oral capsule: 1.5 cap(s) orally every 12 hours  Vitamin D3 1250 mcg (50,000 intl units) oral capsule: 1 cap(s) orally once a week

## 2022-08-14 NOTE — PROGRESS NOTE ADULT - SUBJECTIVE AND OBJECTIVE BOX
[facility] -- Julisa Jansen, Neo, Luly, Karan Kelly Savella, Goodger  Office # [phone]    Follow Up:  Pleural effusion    Subjective/Observations: Patient seen and examined. Patient awake, alert, resting in bed. No complaints of chest pain, dyspnea, palpitations or dizziness. No signs of orthopnea or PND. Tolerating O2 via nasal cannula.       REVIEW OF SYSTEMS: All other review of systems is negative unless indicated above  PAST MEDICAL & SURGICAL HISTORY:  Hypertension      Hyperlipidemia      Diabetes mellitus  Type 1 on insulin pump      CKD (chronic kidney disease)  Renal Transplant 2013 at [facility]      Diabetic peripheral neuropathy      Obesity (BMI 30-39.9)      CVA (cerebral vascular accident)  Wallenberg Stroke  low L body sensation  low R face sensation  decreased peripheral vision  poor balance      Squamous cell carcinoma of skin of left ear      History of DVT (deep vein thrombosis)  s/p eliquis      Recurrent squamous cell carcinoma of skin  nose, L arm      Toe infection  2007 L 4th Toe surgery-amputation secondary to osteomyelitis      Ear disease  Left inner ear surgery, 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 1000 milliLiter(s) (50 mL/Hr) IV Continuous <Continuous>  dextrose 5%. 1000 milliLiter(s) (100 mL/Hr) IV Continuous <Continuous>  dextrose 50% Injectable 25 Gram(s) IV Push once  dextrose 50% Injectable 12.5 Gram(s) IV Push once  dextrose 50% Injectable 25 Gram(s) IV Push once  gabapentin 300 milliGRAM(s) Oral two times a day  glucagon  Injectable 1 milliGRAM(s) IntraMuscular once  heparin   Injectable 5000 Unit(s) SubCutaneous every 8 hours  hydrALAZINE 75 milliGRAM(s) Oral two times a day  insulin lispro (HumaLOG) Pump 1 Each SubCutaneous Continuous Pump  losartan 25 milliGRAM(s) Oral daily  metoprolol tartrate 25 milliGRAM(s) Oral three times a day  tacrolimus 1.5 milliGRAM(s) Oral every 12 hours  trimethoprim   80 mG/sulfamethoxazole 400 mG 1 Tablet(s) Oral daily    MEDICATIONS  (PRN):  dextrose Oral Gel 15 Gram(s) Oral once PRN Blood Glucose LESS THAN 70 milliGRAM(s)/deciliter    Allergies    No Known Allergies    Intolerances      Vital Signs Last 24 Hrs  T(C): 36.9 (14 Aug 2022 05:05), Max: 37.6 (13 Aug 2022 20:32)  T(F): 98.4 (14 Aug 2022 05:05), Max: 99.7 (13 Aug 2022 20:32)  HR: 65 (14 Aug 2022 05:05) (62 - 71)  BP: 168/77 (14 Aug 2022 05:05) (152/81 - 200/80)  BP(mean): --  RR: 18 (14 Aug 2022 05:05) (16 - 18)  SpO2: 96% (14 Aug 2022 05:05) (93% - 97%)    Parameters below as of 14 Aug 2022 05:05  Patient On (Oxygen Delivery Method): BiPAP/CPAP      I&O's Summary    13 Aug 2022 07:01  -  14 Aug 2022 07:00  --------------------------------------------------------  IN: 0 mL / OUT: 800 mL / NET: -800 mL      TELE:  60-70s   PHYSICAL EXAM:  Constitutional: NAD, awake and alert, Obese   HEENT: Moist Mucous Membranes, Anicteric  Pulmonary: Non-labored, breath sounds are clear bilaterally, Diminished No wheezing, rales or rhonchi  Cardiovascular: Regular, S1 and S2, No murmurs, No rubs, gallops or clicks  Gastrointestinal:  soft, nontender, nondistended   Lymph: trace peripheral edema. No lymphadenopathy.   Skin: No visible rashes or ulcers.  Psych:  Mood & affect appropriate      LABS: All Labs Reviewed:                        14.5   6.46  )-----------( 227      ( 14 Aug 2022 06:29 )             45.9                         13.4   5.68  )-----------( 208      ( 13 Aug 2022 06:01 )             41.8                         13.9   6.03  )-----------( 179      ( 12 Aug 2022 10:20 )             42.5     14 Aug 2022 06:29    141    |  102    |  17     ----------------------------<  111    4.6     |  36     |  1.00   13 Aug 2022 06:01    141    |  104    |  18     ----------------------------<  125    4.6     |  35     |  1.10   12 Aug 2022 10:20    139    |  104    |  16     ----------------------------<  209    4.3     |  30     |  1.00     Ca    9.4        14 Aug 2022 06:29  Ca    9.4        13 Aug 2022 06:01  Ca    9.3        12 Aug 2022 10:20    TPro  7.7    /  Alb  3.6    /  TBili  0.6    /  DBili  x      /  AST  16     /  ALT  16     /  AlkPhos  108    12 Aug 2022 10:20    PT/INR - ( 12 Aug 2022 10:20 )   PT: 12.4 sec;   INR: 1.06 ratio         PTT - ( 12 Aug 2022 10:20 )  PTT:29.1 sec    PT/INR - ( 12 Aug 2022 10:20 )   PT: 12.4 sec;   INR: 1.06 ratio         PTT - ( 12 Aug 2022 10:20 )  PTT:29.1 secTroponin I, High Sensitivity Result: 20.1 ng/L (08-12-22 @ 12:40)  Troponin I, High Sensitivity Result: 19.0 ng/L (08-12-22 @ 10:20)        Patient name: LUTHER NORIEGA  YOB: 1965   Age: 51 (M)   MR#: [id number]  Study Date: 7/27/2017  Location: O/PSonographer: Susie Zuleta RDCS  Study quality: Technically difficult  Referring Physician: Kashmir Ochoa MD  Blood Pressure: 150/77 mmHg  Height: 185 cm  Weight: 136 kg  BSA: 2.6 m2  ------------------------------------------------------------------------  PROCEDURE: Transthoracic echocardiogram with 2-D, M-Mode  and complete spectral and color flow Doppler. Intravenous  catheter inserted. Verbal consent was obtained for  injection of echo contrast following a discussion of risks  and benefits. Following intravenous injection of contrast,  harmonic imaging was performed.  INDICATION: Primary pulmonary hypertension(I27.0)  ------------------------------------------------------------------------  Dimensions:    Normal Values:  LA:     4.0    2.0 - 4.0 cm  Ao:     3.5    2.0 - 3.8 cm  SEPTUM: 1.1    0.6 - 1.2 cm  PWT:    1.2    0.6 - 1.1 cm  LVIDd:  4.9    3.0 -5.6 cm  LVIDs:  3.3    1.8 - 4.0 cm  Derived variables:  LVMI: 83 g/m2  RWT: 0.48  Fractional short: 33 %  EF (Teicholtz): 61 %  ------------------------------------------------------------------------  Observations:  Mitral Valve: Normal mitral valve. Minimal mitral  regurgitation.  Aortic Valve/Aorta: Normal trileaflet aortic valve.  Aortic Root: 3.5 cm.  Left Atrium: Normal left atrium.  LA volume index = 20  cc/m2.  Left Ventricle: Normal left ventricular systolic function.  No segmental wallmotion abnormalities. Endocardial  visualization enhanced with intravenous injection of echo  contrast (Definity). Increased relative wall thickness with  normal left ventricular mass index, consistent with  concentric left ventricular remodeling.  Right Heart: Normal right atrium. Right ventricular  enlargement with normal right ventricular systolic  function. Normal tricuspid valve. Minimal tricuspid  regurgitation. Normal pulmonic valve.  Pericardium/Pleura: Normal pericardium with no pericardial  effusion.  Hemodynamic: Estimated right atrial pressure is 8 mm Hg.  Estimated right ventricular systolic pressure equals 29 mm  Hg, assuming right atrial pressure equals 8 mm Hg,  consistent with normal pulmonary pressures.  ------------------------------------------------------------------------  Conclusions:  1. Increased relative wall thickness with normal left  ventricular mass index, consistent with concentric left  ventricular remodeling.  2. Normal left ventricular systolic function. No segmental  wall motion abnormalities. Endocardial visualization  enhanced with intravenous injection of echo contrast  (Definity).  ------------------------------------------------------------------------  Confirmed on  7/27/2017 - 13:49:24 by RASHEED Del Cid  --------------------------------------------------------------------

## 2022-08-14 NOTE — DISCHARGE NOTE PROVIDER - HOSPITAL COURSE
ADMISSION DATE:  08-12-22    ---  FROM ADMISSION H+P:   HPI:  The patient is a 55 y/o M with pmhx HTN, HLD, T1DM (on insulin pump), CKD (s/p renal transplant), CVA (with residual L sided sensation loss, R eye lid drooping), hx DVT (s/p Eliquis), September 11 related lung disease, CARMEN (on nocturnal CPAP), hx restrictive lung disease, squamous cell carcinoma OM L 4th digit s/p amputation, R 2nd digit toe amputation, R 5th digit amputation - who presents to the ED from wound care with an episode of loss of concioussness while recieving hyperbaric oxygen treatment. The patient states that he does not remember losing conciousness, only remembers waking up and being told to have sugar pills. He states this has never happened to him before. Of note, patient was supposed to fly to [facility]. Upon being told he has a R pleural effusion, patient states that he has had one pleural effusion before, which was drained and produced 600cc of fluid 2 months ago, his pulmonolgist is Dr. Boyce. He recalls being told that the workup of the fluid showed no abnormalities.     ED course  T 98.3F, HR 90, /98, RR 18, SpO2 98% r/a  Labs significant for trop 19 -> 20, Glu 209  EKG 1st deg AV block with PVC, ?RBBB  Imaging  -CXR: increasing R effusion  -CT head: no acute pathology     (12 Aug 2022 13:23)      ---  HOSPITAL COURSE/PERTINENT LABS/PROCEDURES PERFORMED/PENDING TESTS: Patient admitted for syncope. CT head showed no acute intracranial pathology. TTE 07/2022 EF 65% with min MR/TR   Neurology consulted, Dr Gill. On admission noticed elevated blood pressure, given home clonidine, BB, hydralazine (increased on 8/13). increased Losartan to 50mg.   On admission found to have a R large pleural effusion, thoracocentesis done on 08/15    ---  PATIENT CONDITION:  - stable    ---  PHYSICAL EXAM ON DAY OF DISCHARGE:    ---  CONSULTANTS:    Neurology, Dr Bridget West, Dr. Martini   Cardio, Dr. Jansen's group   ---  ADVANCED CARE PLANNING:  - Code status:      - MOLST completed:      [  ] NO     [  ] YES    ---  TIME SPENT:  I, the attending physician, was physically present for the key portions of the evaluation and management (E/M) service provided. The total amount of time spent reviewing the hospital notes, laboratory values, imaging findings, assessing/counseling the patient, discussing with consultant physicians, social work, nursing staff was -- minutes ADMISSION DATE:  08-12-22    ---  FROM ADMISSION H+P:   HPI:  The patient is a 55 y/o M with pmhx HTN, HLD, T1DM (on insulin pump), CKD (s/p renal transplant), CVA (with residual L sided sensation loss, R eye lid drooping), hx DVT (s/p Eliquis), September 11 related lung disease, CARMEN (on nocturnal CPAP), hx restrictive lung disease, squamous cell carcinoma OM L 4th digit s/p amputation, R 2nd digit toe amputation, R 5th digit amputation - who presents to the ED from wound care with an episode of loss of concioussness while recieving hyperbaric oxygen treatment. The patient states that he does not remember losing conciousness, only remembers waking up and being told to have sugar pills. He states this has never happened to him before. Of note, patient was supposed to fly to [facility]. Upon being told he has a R pleural effusion, patient states that he has had one pleural effusion before, which was drained and produced 600cc of fluid 2 months ago, his pulmonolgist is Dr. Boyce. He recalls being told that the workup of the fluid showed no abnormalities.     ED course  T 98.3F, HR 90, /98, RR 18, SpO2 98% r/a  Labs significant for trop 19 -> 20, Glu 209  EKG 1st deg AV block with PVC, ?RBBB  Imaging  -CXR: increasing R effusion  -CT head: no acute pathology     (12 Aug 2022 13:23)      ---  HOSPITAL COURSE/PERTINENT LABS/PROCEDURES PERFORMED/PENDING TESTS: Patient admitted for syncope. CT head showed no acute intracranial pathology. TTE 07/2022 EF 65% with min MR/TR  Neurology consulted, Dr Gill. On admission noticed elevated blood pressure, given home clonidine, BB, hydralazine (increased on 8/13). increased Losartan to 50mg.   On admission found to have a R large pleural effusion, thoracocentesis done on 08/15 drained 1L clear yellow fluid, pathology results pending.  During hospitalization patient on 2LNC with good oxygen saturation, when off nasal cannula patient satting at        % prior to discharge. Patient is on nighttime CPAP at home.    ---  PATIENT CONDITION:  - stable    ---  PHYSICAL EXAM ON DAY OF DISCHARGE:    ---  CONSULTANTS:    Neurology, Dr Bridget West, Dr. Martini   Cardio, Dr. Jansen's group   ---  ADVANCED CARE PLANNING:  - Code status:      - MOLST completed:      [  ] NO     [  ] YES    ---  TIME SPENT:  I, the attending physician, was physically present for the key portions of the evaluation and management (E/M) service provided. The total amount of time spent reviewing the hospital notes, laboratory values, imaging findings, assessing/counseling the patient, discussing with consultant physicians, social work, nursing staff was -- minutes ADMISSION DATE:  08-12-22    ---  FROM ADMISSION H+P:   HPI:  The patient is a 57 y/o M with pmhx HTN, HLD, T1DM (on insulin pump), CKD (s/p renal transplant), CVA (with residual L sided sensation loss, R eye lid drooping), hx DVT (s/p Eliquis), September 11 related lung disease, CARMEN (on nocturnal CPAP), hx restrictive lung disease, squamous cell carcinoma OM L 4th digit s/p amputation, R 2nd digit toe amputation, R 5th digit amputation - who presents to the ED from wound care with an episode of loss of concioussness while recieving hyperbaric oxygen treatment. The patient states that he does not remember losing conciousness, only remembers waking up and being told to have sugar pills. He states this has never happened to him before. Of note, patient was supposed to fly to [facility]. Upon being told he has a R pleural effusion, patient states that he has had one pleural effusion before, which was drained and produced 600cc of fluid 2 months ago, his pulmonolgist is Dr. Boyce. He recalls being told that the workup of the fluid showed no abnormalities.     ED course  T 98.3F, HR 90, /98, RR 18, SpO2 98% r/a  Labs significant for trop 19 -> 20, Glu 209  EKG 1st deg AV block with PVC, ?RBBB  Imaging  -CXR: increasing R effusion  -CT head: no acute pathology     (12 Aug 2022 13:23)      ---  HOSPITAL COURSE/PERTINENT LABS/PROCEDURES PERFORMED/PENDING TESTS: Patient admitted for syncope. CT head showed no acute intracranial pathology. TTE 07/2022 EF 65% with min MR/TR  Neurology consulted, Dr Gill. On admission noticed elevated blood pressure, given home clonidine, BB, hydralazine (increased on 8/13). increased Losartan to 50mg.   On admission found to have a R large pleural effusion, thoracocentesis done on 08/15 drained 1L clear yellow fluid, pathology results pending.  During hospitalization patient on 2LNC with good oxygen saturation, when awake off nasal cannula patient satting at mid 90s prior to discharge. Patient is on nighttime CPAP at home.    ---  PATIENT CONDITION:  - stable    ---  PHYSICAL EXAM ON DAY OF DISCHARGE:    T(C): 37.2 (08-16-22 @ 05:02), Max: 37.4 (08-15-22 @ 20:16)  HR: 85 (08-16-22 @ 05:02) (80 - 85)  BP: 155/80 (08-16-22 @ 05:02) (125/69 - 170/68)  RR: 17 (08-16-22 @ 05:02) (17 - 20)  SpO2: 92% (08-16-22 @ 09:21) (92% - 97%)    Physical Exam:   GENERAL: awake and alert, NAD  HEENT: NC/AT; EOM intact, conjunctiva & sclera clear  RESPIRATORY: CTA B/L, no wheezing, rales, rhonchi or rubs  CARDIOVASCULAR: S1&S2, RRR, no murmurs or gallops  ABDOMEN: soft, non-tender, non-distended, + Bowel sounds  MUSCULOSKELETAL:  no clubbing, cyanosis or edema of all 4 extremities. R foot wrapped in dressing which is clean, dry and intact  VASCULAR: Radial pulses 2+ bilaterally, no varicose veins   SKIN: warm and dry, color normal  NEUROLOGIC: AA&O X3, no focal deficits  Psych: Normal mood and affect, normal behavior          ---  CONSULTANTS:    Neurology, Dr Bridget West, Dr. Martini   Cardio, Dr. Jansen's group   ---  ADVANCED CARE PLANNING:  - Code status:      [facility]      [  ] NO     [  ] YES    ---  TIME SPENT:  I, the attending physician, was physically present for the key portions of the evaluation and management (E/M) service provided. The total amount of time spent reviewing the hospital notes, laboratory values, imaging findings, assessing/counseling the patient, discussing with consultant physicians, social work, nursing staff was -- minutes

## 2022-08-14 NOTE — PROGRESS NOTE ADULT - ASSESSMENT
55 y/o M with pmhx HTN, HLD, T1DM (on insulin pump), CKD (s/p renal transplant), CVA (with residual L sided sensation loss, R eye lid drooping), hx DVT (s/p Eliquis), September 11 related lung disease, CARMEN (on nocturnal CPAP), hx restrictive lung disease, squamous cell carcinoma OM L 4th digit s/p amputation, R 2nd digit toe amputation, R 5th digit amputation - who presents to the ED from wound care with an episode of loss of concioussness while recieving hyperbaric oxygen     renal eval noted  will hold am hep dose for thoracentesis in am Monday    nocturnal CPAP  CARMEN management  sleep hygiene  DM care  cvs rx regimen and BP control  serial labs - renal indices   hx of renal transplant  I wayne  monitor VS and Sat  will need Pleurocentesis - Monday - with IR - Loculated right effusion   w/u in progress  Neuro follow up

## 2022-08-14 NOTE — PROGRESS NOTE ADULT - PROBLEM SELECTOR PLAN 2
Recurrent R pleural effusion for pleural effusion 2 months ago, 600cc drained. Pt denies history of lung malignancy ?  - CT chest showed Large right pleural effusion and atelectasis of right lower lobe.  - Plan for thoracocentesis by IR on Monday  - Hold AM heparin Recurrent R pleural effusion for pleural effusion 2 months ago, 600cc drained. Pt denies history of lung malignancy ?  - CT chest showed Large right pleural effusion and atelectasis of right lower lobe.  - Plan for thoracocentesis by IR on Monday  - Hold  tommorow  AM heparin

## 2022-08-14 NOTE — PROGRESS NOTE ADULT - ASSESSMENT
The patient is a 55 y/o M with pmhx HTN, HLD, T1DM (on insulin pump), CKD (s/p renal transplant), CVA, hx DVT (s/p Eliquis), CARMEN (on nocturnal CPAP), hx restrictive lung disease, squamous cell carcinoma OM, multiple toe amputations, presents to the ED from wound care with an episode of loss of concousness while receiving hyperbaric oxygen treatment. Found to have R pleural effusion.

## 2022-08-14 NOTE — PROGRESS NOTE ADULT - TIME BILLING
pt reyna nd examine today see above plan -R pleural effusion for pleural effusion 2 months ago, 600cc drained. Pt denies history of lung malignancy ? but  hx of skin cancer +  - CT chest showed Large right pleural effusion and atelectasis of right lower lobe.  - Plan for thoracocentesis by IR on Monday .
pt seen  a nd examine today see above plan - hx renal transplant immunosuppressed with recurrent  Rt pleural effusion for pleural effusion 2 months ago, 600cc drained. Pt denies history of lung malignancy ? but  hx of skin cancer +  - CT chest showed Large right pleural effusion and atelectasis of right lower lobe-   - Plan for thoracocentesis by us guided  IR on Monday .

## 2022-08-14 NOTE — PROGRESS NOTE ADULT - SUBJECTIVE AND OBJECTIVE BOX
Date/Time Patient Seen:  		  Referring MD:   Data Reviewed	       Patient is a 56y old  Male who presents with a chief complaint of LOC, R pleural effusion (13 Aug 2022 13:03)      Subjective/HPI     PAST MEDICAL & SURGICAL HISTORY:  Hypertension    Hyperlipidemia    Diabetes mellitus  Type 1 on insulin pump    CKD (chronic kidney disease)  Renal Transplant 2013 at [facility]    Diabetic peripheral neuropathy    Obesity (BMI 30-39.9)    CVA (cerebral vascular accident)  Wallenberg Stroke  low L body sensation  low R face sensation  decreased peripheral vision  poor balance    Squamous cell carcinoma of skin of left ear    History of DVT (deep vein thrombosis)  s/p eliquis    Recurrent squamous cell carcinoma of skin  nose, L arm    Toe infection  2007 L 4th Toe surgery-amputation secondary to osteomyelitis    Ear disease  Left inner ear surgery, 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 1000 milliLiter(s) (100 mL/Hr) IV Continuous <Continuous>  dextrose 5%. 1000 milliLiter(s) (50 mL/Hr) IV Continuous <Continuous>  dextrose 50% Injectable 25 Gram(s) IV Push once  dextrose 50% Injectable 12.5 Gram(s) IV Push once  dextrose 50% Injectable 25 Gram(s) IV Push once  gabapentin 300 milliGRAM(s) Oral two times a day  glucagon  Injectable 1 milliGRAM(s) IntraMuscular once  heparin   Injectable 5000 Unit(s) SubCutaneous every 8 hours  hydrALAZINE 75 milliGRAM(s) Oral two times a day  insulin lispro (HumaLOG) Pump 1 Each SubCutaneous Continuous Pump  losartan 25 milliGRAM(s) Oral daily  metoprolol tartrate 25 milliGRAM(s) Oral three times a day  tacrolimus 1.5 milliGRAM(s) Oral every 12 hours  trimethoprim   80 mG/sulfamethoxazole 400 mG 1 Tablet(s) Oral daily    MEDICATIONS  (PRN):  dextrose Oral Gel 15 Gram(s) Oral once PRN Blood Glucose LESS THAN 70 milliGRAM(s)/deciliter         Vitals log        ICU Vital Signs Last 24 Hrs  T(C): 36.9 (14 Aug 2022 05:05), Max: 37.6 (13 Aug 2022 20:32)  T(F): 98.4 (14 Aug 2022 05:05), Max: 99.7 (13 Aug 2022 20:32)  HR: 65 (14 Aug 2022 05:05) (62 - 71)  BP: 168/77 (14 Aug 2022 05:05) (152/81 - 200/80)  BP(mean): --  ABP: --  ABP(mean): --  RR: 18 (14 Aug 2022 05:05) (16 - 18)  SpO2: 96% (14 Aug 2022 05:05) (93% - 97%)    O2 Parameters below as of 14 Aug 2022 05:05  Patient On (Oxygen Delivery Method): BiPAP/CPAP                 Input and Output:  I&O's Detail    13 Aug 2022 07:01  -  14 Aug 2022 07:00  --------------------------------------------------------  IN:  Total IN: 0 mL    OUT:    Voided (mL): 800 mL  Total OUT: 800 mL    Total NET: -800 mL          Lab Data                        13.4   5.68  )-----------( 208      ( 13 Aug 2022 06:01 )             41.8     08-13    141  |  104  |  18  ----------------------------<  125<H>  4.6   |  35<H>  |  1.10    Ca    9.4      13 Aug 2022 06:01    TPro  7.7  /  Alb  3.6  /  TBili  0.6  /  DBili  x   /  AST  16  /  ALT  16  /  AlkPhos  108  08-12            Review of Systems	      Objective     Physical Examination    heart s1s2  lung dec BS      Pertinent Lab findings & Imaging      Eli:  NO   Adequate UO     I&O's Detail    13 Aug 2022 07:01  -  14 Aug 2022 07:00  --------------------------------------------------------  IN:  Total IN: 0 mL    OUT:    Voided (mL): 800 mL  Total OUT: 800 mL    Total NET: -800 mL               Discussed with:     Cultures:	        Radiology

## 2022-08-14 NOTE — DISCHARGE NOTE PROVIDER - NSDCFUSCHEDAPPT_GEN_ALL_CORE_FT
[facility]  HYPERBARIC  [facility]  Scheduled Appointment: 08/15/2022    [facility]  HYPERBARIC  [facility]  Scheduled Appointment: 08/16/2022    [facility]  HYPERBARIC  [facility]  Scheduled Appointment: 08/17/2022    [facility]  HYPERBARIC  [facility]  Scheduled Appointment: 08/18/2022    [facility]  HYPERBARIC  [facility]  Scheduled Appointment: 08/19/2022     [facility]  HYPERBARIC  [facility]  Scheduled Appointment: 08/17/2022    [facility]  HYPERBARIC  [facility]  Scheduled Appointment: 08/18/2022    [facility]  HYPERBARIC  [facility]  Scheduled Appointment: 08/19/2022

## 2022-08-14 NOTE — PROGRESS NOTE ADULT - SUBJECTIVE AND OBJECTIVE BOX
Neurology follow up note    LUTHER ANDREWSO56yMale      Interval History:    Patient feels ok no new complaints.    Allergies    No Known Allergies    Intolerances        MEDICATIONS    aspirin enteric coated 325 milliGRAM(s) Oral daily  atorvastatin 10 milliGRAM(s) Oral at bedtime  azaTHIOprine 100 milliGRAM(s) Oral daily  cloNIDine 0.1 milliGRAM(s) Oral two times a day  dextrose 5%. 1000 milliLiter(s) IV Continuous <Continuous>  dextrose 5%. 1000 milliLiter(s) IV Continuous <Continuous>  dextrose 50% Injectable 25 Gram(s) IV Push once  dextrose 50% Injectable 12.5 Gram(s) IV Push once  dextrose 50% Injectable 25 Gram(s) IV Push once  dextrose Oral Gel 15 Gram(s) Oral once PRN  gabapentin 300 milliGRAM(s) Oral two times a day  glucagon  Injectable 1 milliGRAM(s) IntraMuscular once  heparin   Injectable 5000 Unit(s) SubCutaneous every 8 hours  hydrALAZINE 75 milliGRAM(s) Oral two times a day  insulin lispro (HumaLOG) Pump 1 Each SubCutaneous Continuous Pump  losartan 25 milliGRAM(s) Oral daily  metoprolol tartrate 25 milliGRAM(s) Oral three times a day  tacrolimus 1.5 milliGRAM(s) Oral every 12 hours  trimethoprim   80 mG/sulfamethoxazole 400 mG 1 Tablet(s) Oral daily              Vital Signs Last 24 Hrs  T(C): 36.9 (14 Aug 2022 05:05), Max: 37.6 (13 Aug 2022 20:32)  T(F): 98.4 (14 Aug 2022 05:05), Max: 99.7 (13 Aug 2022 20:32)  HR: 65 (14 Aug 2022 05:05) (62 - 71)  BP: 168/77 (14 Aug 2022 05:05) (152/81 - 200/80)  BP(mean): --  RR: 18 (14 Aug 2022 05:05) (16 - 18)  SpO2: 96% (14 Aug 2022 05:05) (93% - 97%)    Parameters below as of 14 Aug 2022 05:05  Patient On (Oxygen Delivery Method): BiPAP/CPAP        REVIEW OF SYSTEMS:  Constitutional:  The patient denies fever, chills, or night sweats.  Head:  No headache.  Eyes:  No double vision or blurry vision.  Ears:  No ringing in his ears.  Neck:  No neck pain.  Respiratory:  No shortness of breath.  Cardiovascular:  No chest pain.  Abdomen:  No nausea, vomiting, or abdominal pain.  Extremities/Neurological:  Decreased sensation on the left side, which is not new.    PHYSICAL EXAMINATION:   HEENT:  Head:  Normocephalic, atraumatic.  Eyes:  No scleral icterus.  Ears:  Hearing bilaterally intact.  NECK:  Supple.  RESPIRATORY:  Good air entry bilaterally.  CARDIOVASCULAR:  S1 and S2 heard.  ABDOMEN:  Soft and nontender.  EXTREMITIES:  No clubbing or cyanosis were noted.      NEUROLOGIC:  The patient is awake, alert, and oriented x3.  Extraocular movements were intact.  Pupils were equal, round, and reactive bilaterally 3 mm to 2 mm.  Speech was fluent.  Smile symmetric.  Motor:  Bilateral upper 5/5, bilateral lower 4+/5.  Sensory:  Has decreased light touch to entire left side, which is not new.  The patient has problems with his right eye, which is old, not new.                       LABS:  CBC Full  -  ( 13 Aug 2022 06:01 )  WBC Count : 5.68 K/uL  RBC Count : 4.74 M/uL  Hemoglobin : 13.4 g/dL  Hematocrit : 41.8 %  Platelet Count - Automated : 208 K/uL  Mean Cell Volume : 88.2 fl  Mean Cell Hemoglobin : 28.3 pg  Mean Cell Hemoglobin Concentration : 32.1 gm/dL  Auto Neutrophil # : x  Auto Lymphocyte # : x  Auto Monocyte # : x  Auto Eosinophil # : x  Auto Basophil # : x  Auto Neutrophil % : x  Auto Lymphocyte % : x  Auto Monocyte % : x  Auto Eosinophil % : x  Auto Basophil % : x      08-13    141  |  104  |  18  ----------------------------<  125<H>  4.6   |  35<H>  |  1.10    Ca    9.4      13 Aug 2022 06:01    TPro  7.7  /  Alb  3.6  /  TBili  0.6  /  DBili  x   /  AST  16  /  ALT  16  /  AlkPhos  108  08-12    Hemoglobin A1C:     LIVER FUNCTIONS - ( 12 Aug 2022 10:20 )  Alb: 3.6 g/dL / Pro: 7.7 g/dL / ALK PHOS: 108 U/L / ALT: 16 U/L / AST: 16 U/L / GGT: x           Vitamin B12   PT/INR - ( 12 Aug 2022 10:20 )   PT: 12.4 sec;   INR: 1.06 ratio         PTT - ( 12 Aug 2022 10:20 )  PTT:29.1 sec      RADIOLOGY    ANALYSIS AND PLAN:  This is a 56-year-old with episode of loss of consciousness and shaking.  For episode of loss of consciousness and shaking, suspect most likely this was syncope with convulsion stiffening.  Episode was brief.  Upon arrival by emergency room staff, the patient's blood pressure was 95, and the patient felt woozy prior to event.  The patient has no prior aura to suggest underlying epilepsy, does not see any flashing lights, any weird smell or weird taste.  No report of tongue bite marks or loss of urine upon awakening.  No report of shaking at night.  For history of cerebrovascular accident, continue the patient on antiplatelet.  For history of neuropathy, continue the patient on gabapentin.  For diabetes, strict control of blood sugars.  Cardiology followup.  Pulmonary follow up     Greater than 45 minutes of time was spent with the patient, plan of care, reviewing data, with greater than 50% of the visit was spent counseling and/or coordinating care with multidisciplinary healthcare team   Neurology follow up note    LUTHER ANDREWSO56yMale      Interval History:    Patient feels ok no new complaints.    Allergies    No Known Allergies    Intolerances        MEDICATIONS    aspirin enteric coated 325 milliGRAM(s) Oral daily  atorvastatin 10 milliGRAM(s) Oral at bedtime  azaTHIOprine 100 milliGRAM(s) Oral daily  cloNIDine 0.1 milliGRAM(s) Oral two times a day  dextrose 5%. 1000 milliLiter(s) IV Continuous <Continuous>  dextrose 5%. 1000 milliLiter(s) IV Continuous <Continuous>  dextrose 50% Injectable 25 Gram(s) IV Push once  dextrose 50% Injectable 12.5 Gram(s) IV Push once  dextrose 50% Injectable 25 Gram(s) IV Push once  dextrose Oral Gel 15 Gram(s) Oral once PRN  gabapentin 300 milliGRAM(s) Oral two times a day  glucagon  Injectable 1 milliGRAM(s) IntraMuscular once  heparin   Injectable 5000 Unit(s) SubCutaneous every 8 hours  hydrALAZINE 75 milliGRAM(s) Oral two times a day  insulin lispro (HumaLOG) Pump 1 Each SubCutaneous Continuous Pump  losartan 25 milliGRAM(s) Oral daily  metoprolol tartrate 25 milliGRAM(s) Oral three times a day  tacrolimus 1.5 milliGRAM(s) Oral every 12 hours  trimethoprim   80 mG/sulfamethoxazole 400 mG 1 Tablet(s) Oral daily              Vital Signs Last 24 Hrs  T(C): 36.9 (14 Aug 2022 05:05), Max: 37.6 (13 Aug 2022 20:32)  T(F): 98.4 (14 Aug 2022 05:05), Max: 99.7 (13 Aug 2022 20:32)  HR: 65 (14 Aug 2022 05:05) (62 - 71)  BP: 168/77 (14 Aug 2022 05:05) (152/81 - 200/80)  BP(mean): --  RR: 18 (14 Aug 2022 05:05) (16 - 18)  SpO2: 96% (14 Aug 2022 05:05) (93% - 97%)    Parameters below as of 14 Aug 2022 05:05  Patient On (Oxygen Delivery Method): BiPAP/CPAP        REVIEW OF SYSTEMS:  Constitutional:  The patient denies fever, chills, or night sweats.  Head:  No headache.  Eyes:  No double vision or blurry vision.  Ears:  No ringing in his ears.  Neck:  No neck pain.  Respiratory:  No shortness of breath.  Cardiovascular:  No chest pain.  Abdomen:  No nausea, vomiting, or abdominal pain.  Extremities/Neurological:  Decreased sensation on the left side, which is not new.    PHYSICAL EXAMINATION:   HEENT:  Head:  Normocephalic, atraumatic.  Eyes:  No scleral icterus.  Ears:  Hearing bilaterally intact.  NECK:  Supple.  RESPIRATORY:  Good air entry bilaterally.  CARDIOVASCULAR:  S1 and S2 heard.  ABDOMEN:  Soft and nontender.  EXTREMITIES:  No clubbing or cyanosis were noted.      NEUROLOGIC:  The patient is awake, alert, and oriented x3.  Extraocular movements were intact.  Pupils were equal, round, and reactive bilaterally 3 mm to 2 mm.  Speech was fluent.  Smile symmetric.  Motor:  Bilateral upper 5/5, bilateral lower 4+/5.  Sensory:  Has decreased light touch to entire left side, which is not new.  The patient has problems with his right eye, which is old, not new.                       LABS:  CBC Full  -  ( 13 Aug 2022 06:01 )  WBC Count : 5.68 K/uL  RBC Count : 4.74 M/uL  Hemoglobin : 13.4 g/dL  Hematocrit : 41.8 %  Platelet Count - Automated : 208 K/uL  Mean Cell Volume : 88.2 fl  Mean Cell Hemoglobin : 28.3 pg  Mean Cell Hemoglobin Concentration : 32.1 gm/dL  Auto Neutrophil # : x  Auto Lymphocyte # : x  Auto Monocyte # : x  Auto Eosinophil # : x  Auto Basophil # : x  Auto Neutrophil % : x  Auto Lymphocyte % : x  Auto Monocyte % : x  Auto Eosinophil % : x  Auto Basophil % : x      08-13    141  |  104  |  18  ----------------------------<  125<H>  4.6   |  35<H>  |  1.10    Ca    9.4      13 Aug 2022 06:01    TPro  7.7  /  Alb  3.6  /  TBili  0.6  /  DBili  x   /  AST  16  /  ALT  16  /  AlkPhos  108  08-12    Hemoglobin A1C:     LIVER FUNCTIONS - ( 12 Aug 2022 10:20 )  Alb: 3.6 g/dL / Pro: 7.7 g/dL / ALK PHOS: 108 U/L / ALT: 16 U/L / AST: 16 U/L / GGT: x           Vitamin B12   PT/INR - ( 12 Aug 2022 10:20 )   PT: 12.4 sec;   INR: 1.06 ratio         PTT - ( 12 Aug 2022 10:20 )  PTT:29.1 sec      RADIOLOGY    ANALYSIS AND PLAN:  This is a 56-year-old with episode of loss of consciousness and shaking.  For episode of loss of consciousness and shaking, suspect most likely this was syncope with convulsion stiffening.  Episode was brief.  Upon arrival by emergency room staff, the patient's blood pressure was 95, and the patient felt woozy prior to event.  The patient has no prior aura to suggest underlying epilepsy, does not see any flashing lights, any weird smell or weird taste.  No report of tongue bite marks or loss of urine upon awakening.  No report of shaking at night.  For history of cerebrovascular accident, continue the patient on antiplatelet.  For history of neuropathy, continue the patient on gabapentin.  For diabetes, strict control of blood sugars.  Cardiology followup.  Pulmonary follow up   no new events     Greater than 40 minutes of time was spent with the patient, plan of care, reviewing data, with greater than 50% of the visit was spent counseling and/or coordinating care with multidisciplinary healthcare team

## 2022-08-14 NOTE — DISCHARGE NOTE PROVIDER - CARE PROVIDER_API CALL
MARIAJOSE HOUSTON  Internal Medicine  [address] [place] [address]  Phone: [phone]  Fax: [phone])-  Follow Up Time: 1 week   MARIAJOSE HOUSTON  Internal Medicine  [address] [place] [address]  Phone: ([phone]  Fax: ()-  Follow Up Time: 1 week    Pollo Boyce  Pulmonary Diseases  [address] [place] [address], [address]  [place] [address]  Phone: [phone]  Fax: [phone]  Established Patient  Follow Up Time: 2 weeks

## 2022-08-14 NOTE — PROGRESS NOTE ADULT - ASSESSMENT
55 y/o M with pmhx HTN, HLD, T1DM (on insulin pump), CKD (s/p renal transplant), CVA, hx DVT (s/p Eliquis), CARMEN (on nocturnal CPAP), hx restrictive lung disease, squamous cell carcinoma OM, multiple toe amputations, presents to the ED from wound care with an episode of loss of consciousness while receiving hyperbaric oxygen treatment. Found to have R pleural effusion.     Syncope, HTN, HLD, pleural effusion  - Head CT shows no acute events. Neuro following   - Tele with SR 60-70s, no events     - TTE 07/2022 EF 65% with min MR/TR  - CXR with loculated right effusion   - Patient had prev thoracentesis for pleural effusion 2 months ago, pulm following   - Plan for Pleurocentesis  8/14  with IR     - Pharm nuc stress test showed normal myocardial perfusion   - ECG shows NSR with PVCs  - Troponin: <-20.1, <-19.0  - No evidence of any active ischemia   - Continue aspirin and statin     - -170s   - Continue clonidine, BB, hydralazine (increased on 8/13). would increase Losartan to 50mg for better BP control.    - Monitor and replete lytes, keep K>4, Mg>2.  - Will continue to follow.    Nell Schilling NP  Nurse Practitioner- Cardiology   Spectra #[phone]

## 2022-08-14 NOTE — PROGRESS NOTE ADULT - SUBJECTIVE AND OBJECTIVE BOX
Patient is a 56y old  Male who presents with a chief complaint of LOC, R pleural effusion (14 Aug 2022 09:40)    ----  INTERVAL HPI/OVERNIGHT EVENTS: Pt seen and evaluated at the bedside. No acute overnight events occurred.     ----  PAST MEDICAL & SURGICAL HISTORY:  Hypertension      Hyperlipidemia      Diabetes mellitus  Type 1 on insulin pump      CKD (chronic kidney disease)  Renal Transplant 2013 at [facility]      Diabetic peripheral neuropathy      Obesity (BMI 30-39.9)      CVA (cerebral vascular accident)  Wallenberg Stroke  low L body sensation  low R face sensation  decreased peripheral vision  poor balance      Squamous cell carcinoma of skin of left ear      History of DVT (deep vein thrombosis)  s/p eliquis      Recurrent squamous cell carcinoma of skin  nose, L arm      Toe infection  2007 L 4th Toe surgery-amputation secondary to osteomyelitis      Ear disease  Left inner ear surgery, 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, Can NOT have MRI      Vasectomy status  s/p b/l  vasectomy      H/O foot surgery  2005 - right foot - bone fracture - s/p ORIF and subsequent removal of hardware      End-stage renal failure with renal transplant  12/2013      S/P kidney transplant      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          FAMILY HISTORY:  Family history of diabetes mellitus (DM)    FH: skin cancer        Allergies    No Known Allergies    Intolerances        ----  REVIEW OF SYSTEMS:  CONSTITUTIONAL: denies fever, chills, fatigue, weakness  CARDIOVASCULAR: denies chest pain, chest pressure, palpitations  RESPIRATORY: denies shortness of breath, sputum production  GASTROINTESTINAL: denies nausea, vomiting, diarrhea, abdominal pain  GENITOURINARY: denies dysuria, discharge  NEUROLOGICAL: denies numbness, headache, focal weakness  MUSCULOSKELETAL: denies new joint pain, muscle aches  HEMATOLOGIC: denies gross bleeding, bruising  PSYCHIATRIC: denies recent changes in anxiety, depression  ENDOCRINOLOGIC: denies sweating, cold or heat intolerance    ----  PHYSICAL EXAM:  GENERAL: patient appears well, no acute distress, appropriately interactive  EYES: sclera clear, no exudates  LUNGS: good air entry bilaterally, clear to auscultation, no wheezing or rhonchi appreciated  HEART: S1/S2, regular rate and rhythm, no murmurs noted, no noted edema to b/l LE  GASTROINTESTINAL: abdomen is soft, nontender, nondistended, normoactive bowel sounds, no palpable masses  MUSCULOSKELETAL: no clubbing or cyanosis, no obvious deformity  NEUROLOGIC: awake, alert, oriented x3, strength 5/5 UE and LE , no sensory deficits  PSYCHIATRIC: mood is good, affect is congruent with mood, linear and logical thought process      T(C): 37.3 (08-14-22 @ 13:19), Max: 37.6 (08-13-22 @ 20:32)  HR: 72 (08-14-22 @ 13:19) (62 - 72)  BP: 162/70 (08-14-22 @ 13:19) (162/70 - 200/80)  RR: 17 (08-14-22 @ 13:19) (16 - 18)  SpO2: 94% (08-14-22 @ 13:19) (93% - 97%)  Wt(kg): --    ----  I&O's Summary    13 Aug 2022 07:01  -  14 Aug 2022 07:00  --------------------------------------------------------  IN: 0 mL / OUT: 800 mL / NET: -800 mL        LABS:                        14.5   6.46  )-----------( 227      ( 14 Aug 2022 06:29 )             45.9     08-14    141  |  102  |  17  ----------------------------<  111<H>  4.6   |  36<H>  |  1.00    Ca    9.4      14 Aug 2022 06:29          CAPILLARY BLOOD GLUCOSE      POCT Blood Glucose.: 173 mg/dL (14 Aug 2022 12:02)  POCT Blood Glucose.: 117 mg/dL (14 Aug 2022 07:43)  POCT Blood Glucose.: 199 mg/dL (13 Aug 2022 21:03)  POCT Blood Glucose.: 163 mg/dL (13 Aug 2022 17:19)                   Patient is a 56y old  Male who presents with a chief complaint of LOC, R pleural effusion (14 Aug 2022 09:40)    ----  INTERVAL HPI/OVERNIGHT EVENTS: Pt seen and evaluated at the bedside. No acute overnight events occurred.   pt state no sob , no cp , going ir guided drain in am .  ----  PAST MEDICAL & SURGICAL HISTORY:  Hypertension      Hyperlipidemia      Diabetes mellitus  Type 1 on insulin pump      CKD (chronic kidney disease)  Renal Transplant 2013 at [facility]      Diabetic peripheral neuropathy      Obesity (BMI 30-39.9)      CVA (cerebral vascular accident)  Wallenberg Stroke  low L body sensation  low R face sensation  decreased peripheral vision  poor balance      Squamous cell carcinoma of skin of left ear      History of DVT (deep vein thrombosis)  s/p eliquis      Recurrent squamous cell carcinoma of skin  nose, L arm      Toe infection  2007 L 4th Toe surgery-amputation secondary to osteomyelitis      Ear disease  Left inner ear surgery, 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, Can NOT have MRI      Vasectomy status  s/p b/l  vasectomy      H/O foot surgery  2005 - right foot - bone fracture - s/p ORIF and subsequent removal of hardware      End-stage renal failure with renal transplant  12/2013      S/P kidney transplant      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          FAMILY HISTORY:  Family history of diabetes mellitus (DM)    FH: skin cancer        Allergies    No Known Allergies    Intolerances        ----  REVIEW OF SYSTEMS:  CONSTITUTIONAL: denies fever, chills, fatigue, weakness  CARDIOVASCULAR: denies chest pain, chest pressure, palpitations  RESPIRATORY: denies shortness of breath, sputum production  GASTROINTESTINAL: denies nausea, vomiting, diarrhea, abdominal pain  GENITOURINARY: denies dysuria, discharge  NEUROLOGICAL: denies numbness, headache, focal weakness  MUSCULOSKELETAL: denies new joint pain, muscle aches  HEMATOLOGIC: denies gross bleeding, bruising  PSYCHIATRIC: denies recent changes in anxiety, depression  ENDOCRINOLOGIC: denies sweating, cold or heat intolerance    ----  PHYSICAL EXAM:  GENERAL: patient appears well, no acute distress, appropriately interactive  EYES: sclera clear, no exudates  LUNGS: good air entry bilaterally, clear to auscultation, no wheezing or rhonchi appreciated  HEART: S1/S2, regular rate and rhythm, no murmurs noted, no noted edema to b/l LE  GASTROINTESTINAL: abdomen is soft, nontender, nondistended, normoactive bowel sounds, no palpable masses  MUSCULOSKELETAL: no clubbing or cyanosis, no obvious deformity  NEUROLOGIC: awake, alert, oriented x3, strength 5/5 UE and LE , no sensory deficits  PSYCHIATRIC: mood is good, affect is congruent with mood, linear and logical thought process      T(C): 37.3 (08-14-22 @ 13:19), Max: 37.6 (08-13-22 @ 20:32)  HR: 72 (08-14-22 @ 13:19) (62 - 72)  BP: 162/70 (08-14-22 @ 13:19) (162/70 - 200/80)  RR: 17 (08-14-22 @ 13:19) (16 - 18)  SpO2: 94% (08-14-22 @ 13:19) (93% - 97%)  Wt(kg): --    ----  I&O's Summary    13 Aug 2022 07:01  -  14 Aug 2022 07:00  --------------------------------------------------------  IN: 0 mL / OUT: 800 mL / NET: -800 mL        LABS:                        14.5   6.46  )-----------( 227      ( 14 Aug 2022 06:29 )             45.9     08-14    141  |  102  |  17  ----------------------------<  111<H>  4.6   |  36<H>  |  1.00    Ca    9.4      14 Aug 2022 06:29          CAPILLARY BLOOD GLUCOSE      POCT Blood Glucose.: 173 mg/dL (14 Aug 2022 12:02)  POCT Blood Glucose.: 117 mg/dL (14 Aug 2022 07:43)  POCT Blood Glucose.: 199 mg/dL (13 Aug 2022 21:03)  POCT Blood Glucose.: 163 mg/dL (13 Aug 2022 17:19)

## 2022-08-14 NOTE — DISCHARGE NOTE PROVIDER - PROVIDER TOKENS
PROVIDER:[TOKEN:[[id number]],FOLLOWUP:[1 week]] PROVIDER:[TOKEN:[[id number]],FOLLOWUP:[1 week]],PROVIDER:[TOKEN:[[id number]],FOLLOWUP:[2 weeks],ESTABLISHEDPATIENT:[T]]

## 2022-08-14 NOTE — DISCHARGE NOTE PROVIDER - NSDCCPCAREPLAN_GEN_ALL_CORE_FT
PRINCIPAL DISCHARGE DIAGNOSIS  Diagnosis: Pleural effusion  Assessment and Plan of Treatment: You were  found to have a fluid in the right lung, thoracosentesis done on ----        SECONDARY DISCHARGE DIAGNOSES  Diagnosis: Hypertensive urgency  Assessment and Plan of Treatment: You have history of elevated blood pressure. During your hospital stay we adjust your blood pressure medications for better control   - INCREASED hydralazin to 75 mg every 8 hours and losartan 50 mg  - Continue metoprolol and clonidine as prescribed per your doctor  Must follow up with your primary care Dr. Miranda within one week of hospital discharge    Diagnosis: Syncope  Assessment and Plan of Treatment: CT head was negative acute brain pathology   You were seen by neurology     PRINCIPAL DISCHARGE DIAGNOSIS  Diagnosis: Pleural effusion  Assessment and Plan of Treatment: You were  found to have a fluid in the right lung, thoracosentesis done on 8/15/22 drained 1 liter of clear yellow fluid from your lung. The fluid was sent for testing.  Please follow up with your pulmonologist Dr. Boyce after discharge.        SECONDARY DISCHARGE DIAGNOSES  Diagnosis: Hypertensive urgency  Assessment and Plan of Treatment: You have history of elevated blood pressure. During your hospital stay we adjust your blood pressure medications for better control   - INCREASED hydralazine to 75 mg every 12 hours and losartan 50 MG once a day  - Continue metoprolol and clonidine as prescribed per your doctor  Must follow up with your primary care Dr. Miranda within one week of hospital discharge    Diagnosis: Syncope  Assessment and Plan of Treatment: CT head was negative acute brain pathology   You were seen by neurology

## 2022-08-15 ENCOUNTER — RESULT REVIEW (OUTPATIENT)
Age: 57
End: 2022-08-15

## 2022-08-15 ENCOUNTER — APPOINTMENT (OUTPATIENT)
Dept: HYPERBARIC MEDICINE | Facility: HOSPITAL | Age: 57
End: 2022-08-15

## 2022-08-15 LAB
ALBUMIN FLD-MCNC: 3.2 G/DL — SIGNIFICANT CHANGE UP
ANION GAP SERPL CALC-SCNC: 2 MMOL/L — LOW (ref 5–17)
B PERT IGG+IGM PNL SER: CLEAR — SIGNIFICANT CHANGE UP
BASOPHILS # BLD AUTO: 0.04 K/UL — SIGNIFICANT CHANGE UP (ref 0–0.2)
BASOPHILS NFR BLD AUTO: 0.6 % — SIGNIFICANT CHANGE UP (ref 0–2)
BUN SERPL-MCNC: 20 MG/DL — SIGNIFICANT CHANGE UP (ref 7–23)
CALCIUM SERPL-MCNC: 9.6 MG/DL — SIGNIFICANT CHANGE UP (ref 8.5–10.1)
CHLORIDE SERPL-SCNC: 101 MMOL/L — SIGNIFICANT CHANGE UP (ref 96–108)
CO2 SERPL-SCNC: 37 MMOL/L — HIGH (ref 22–31)
COLOR FLD: YELLOW — SIGNIFICANT CHANGE UP
CREAT SERPL-MCNC: 1.2 MG/DL — SIGNIFICANT CHANGE UP (ref 0.5–1.3)
EGFR: 71 ML/MIN/1.73M2 — SIGNIFICANT CHANGE UP
EOSINOPHIL # BLD AUTO: 0.2 K/UL — SIGNIFICANT CHANGE UP (ref 0–0.5)
EOSINOPHIL # FLD: 9 % — SIGNIFICANT CHANGE UP
EOSINOPHIL NFR BLD AUTO: 3.1 % — SIGNIFICANT CHANGE UP (ref 0–6)
FLUID INTAKE SUBSTANCE CLASS: SIGNIFICANT CHANGE UP
FOLATE+VIT B12 SERBLD-IMP: 0 % — SIGNIFICANT CHANGE UP
GLUCOSE FLD-MCNC: 150 MG/DL — SIGNIFICANT CHANGE UP
GLUCOSE SERPL-MCNC: 135 MG/DL — HIGH (ref 70–99)
GRAM STN FLD: SIGNIFICANT CHANGE UP
HCT VFR BLD CALC: 43.1 % — SIGNIFICANT CHANGE UP (ref 39–50)
HCT VFR BLD CALC: 44.7 % — SIGNIFICANT CHANGE UP (ref 39–50)
HGB BLD-MCNC: 13.5 G/DL — SIGNIFICANT CHANGE UP (ref 13–17)
HGB BLD-MCNC: 14.3 G/DL — SIGNIFICANT CHANGE UP (ref 13–17)
IMM GRANULOCYTES NFR BLD AUTO: 0.3 % — SIGNIFICANT CHANGE UP (ref 0–1.5)
LDH SERPL L TO P-CCNC: 189 U/L — SIGNIFICANT CHANGE UP
LDH SERPL L TO P-CCNC: 228 U/L — SIGNIFICANT CHANGE UP (ref 50–242)
LYMPHOCYTES # BLD AUTO: 1.13 K/UL — SIGNIFICANT CHANGE UP (ref 1–3.3)
LYMPHOCYTES # BLD AUTO: 17.6 % — SIGNIFICANT CHANGE UP (ref 13–44)
LYMPHOCYTES # FLD: 58 % — SIGNIFICANT CHANGE UP
MCHC RBC-ENTMCNC: 27.9 PG — SIGNIFICANT CHANGE UP (ref 27–34)
MCHC RBC-ENTMCNC: 31.3 GM/DL — LOW (ref 32–36)
MCV RBC AUTO: 89 FL — SIGNIFICANT CHANGE UP (ref 80–100)
MESOTHL CELL # FLD: 2 % — SIGNIFICANT CHANGE UP
MONOCYTES # BLD AUTO: 0.59 K/UL — SIGNIFICANT CHANGE UP (ref 0–0.9)
MONOCYTES NFR BLD AUTO: 9.2 % — SIGNIFICANT CHANGE UP (ref 2–14)
MONOS+MACROS # FLD: 26 % — SIGNIFICANT CHANGE UP
NEUTROPHILS # BLD AUTO: 4.43 K/UL — SIGNIFICANT CHANGE UP (ref 1.8–7.4)
NEUTROPHILS NFR BLD AUTO: 69.2 % — SIGNIFICANT CHANGE UP (ref 43–77)
NEUTROPHILS-BODY FLUID: 5 % — SIGNIFICANT CHANGE UP
NRBC # BLD: 0 /100 WBCS — SIGNIFICANT CHANGE UP (ref 0–0)
NRBC # FLD: 0 % — SIGNIFICANT CHANGE UP
OTHER CELLS FLD MANUAL: 0 % — SIGNIFICANT CHANGE UP
PH FLD: 7.7 — SIGNIFICANT CHANGE UP
PLATELET # BLD AUTO: 213 K/UL — SIGNIFICANT CHANGE UP (ref 150–400)
POTASSIUM SERPL-MCNC: 4.3 MMOL/L — SIGNIFICANT CHANGE UP (ref 3.5–5.3)
POTASSIUM SERPL-SCNC: 4.3 MMOL/L — SIGNIFICANT CHANGE UP (ref 3.5–5.3)
PROT FLD-MCNC: 4.9 G/DL — SIGNIFICANT CHANGE UP
RBC # BLD: 4.84 M/UL — SIGNIFICANT CHANGE UP (ref 4.2–5.8)
RBC # FLD: 13.3 % — SIGNIFICANT CHANGE UP (ref 10.3–14.5)
RCV VOL RI: 1000 /UL — HIGH (ref 0–0)
SODIUM SERPL-SCNC: 140 MMOL/L — SIGNIFICANT CHANGE UP (ref 135–145)
SPECIMEN SOURCE: SIGNIFICANT CHANGE UP
TOTAL NUCLEATED CELL COUNT, BODY FLUID: 990 /UL — SIGNIFICANT CHANGE UP
TUBE TYPE: SIGNIFICANT CHANGE UP
WBC # BLD: 6.41 K/UL — SIGNIFICANT CHANGE UP (ref 3.8–10.5)
WBC # FLD AUTO: 6.41 K/UL — SIGNIFICANT CHANGE UP (ref 3.8–10.5)

## 2022-08-15 PROCEDURE — 99232 SBSQ HOSP IP/OBS MODERATE 35: CPT

## 2022-08-15 PROCEDURE — 32555 ASPIRATE PLEURA W/ IMAGING: CPT | Mod: RT

## 2022-08-15 PROCEDURE — 71045 X-RAY EXAM CHEST 1 VIEW: CPT | Mod: 26,77,59

## 2022-08-15 PROCEDURE — 99221 1ST HOSP IP/OBS SF/LOW 40: CPT

## 2022-08-15 PROCEDURE — 71045 X-RAY EXAM CHEST 1 VIEW: CPT | Mod: 26

## 2022-08-15 PROCEDURE — 88305 TISSUE EXAM BY PATHOLOGIST: CPT | Mod: 26

## 2022-08-15 PROCEDURE — 88108 CYTOPATH CONCENTRATE TECH: CPT | Mod: 26

## 2022-08-15 PROCEDURE — 99233 SBSQ HOSP IP/OBS HIGH 50: CPT

## 2022-08-15 RX ORDER — HEPARIN SODIUM 5000 [USP'U]/ML
5000 INJECTION INTRAVENOUS; SUBCUTANEOUS EVERY 8 HOURS
Refills: 0 | Status: DISCONTINUED | OUTPATIENT
Start: 2022-08-16 | End: 2022-08-16

## 2022-08-15 RX ADMIN — Medication 75 MILLIGRAM(S): at 05:15

## 2022-08-15 RX ADMIN — AZATHIOPRINE 100 MILLIGRAM(S): 100 TABLET ORAL at 13:32

## 2022-08-15 RX ADMIN — ATORVASTATIN CALCIUM 10 MILLIGRAM(S): 80 TABLET, FILM COATED ORAL at 21:17

## 2022-08-15 RX ADMIN — GABAPENTIN 300 MILLIGRAM(S): 400 CAPSULE ORAL at 05:15

## 2022-08-15 RX ADMIN — Medication 25 MILLIGRAM(S): at 21:18

## 2022-08-15 RX ADMIN — Medication 325 MILLIGRAM(S): at 13:32

## 2022-08-15 RX ADMIN — GABAPENTIN 300 MILLIGRAM(S): 400 CAPSULE ORAL at 17:26

## 2022-08-15 RX ADMIN — TACROLIMUS 1.5 MILLIGRAM(S): 5 CAPSULE ORAL at 21:17

## 2022-08-15 RX ADMIN — Medication 75 MILLIGRAM(S): at 17:25

## 2022-08-15 RX ADMIN — Medication 3 MILLIGRAM(S): at 21:21

## 2022-08-15 RX ADMIN — Medication 25 MILLIGRAM(S): at 05:16

## 2022-08-15 RX ADMIN — Medication 0.1 MILLIGRAM(S): at 05:16

## 2022-08-15 RX ADMIN — Medication 0.1 MILLIGRAM(S): at 17:25

## 2022-08-15 RX ADMIN — Medication 25 MILLIGRAM(S): at 13:32

## 2022-08-15 RX ADMIN — TACROLIMUS 1.5 MILLIGRAM(S): 5 CAPSULE ORAL at 09:05

## 2022-08-15 RX ADMIN — Medication 1 TABLET(S): at 13:32

## 2022-08-15 RX ADMIN — LOSARTAN POTASSIUM 50 MILLIGRAM(S): 100 TABLET, FILM COATED ORAL at 05:17

## 2022-08-15 NOTE — PROGRESS NOTE ADULT - PROBLEM SELECTOR PLAN 2
Recurrent R pleural effusion for pleural effusion 2 months ago, 600cc drained. Pt denies history of lung malignancy  - CT chest showed Large right pleural effusion and atelectasis of right lower lobe.  - thoracentesis with IR this AM drained 1L clear yellow fluid. On wet read rpt CXR appears R pleural effusion improved Recurrent R pleural effusion for pleural effusion 2 months ago, 600cc drained. Pt denies history of lung malignancy  - CT chest showed Large right pleural effusion and atelectasis of right lower lobe.  - thoracentesis with IR this AM drained 1L clear yellow fluid. On wet read rpt CXR appears R pleural effusion improved, f/u official read

## 2022-08-15 NOTE — PROGRESS NOTE ADULT - SUBJECTIVE AND OBJECTIVE BOX
[facility] -- Julisa Jansen, Luly Larson, Karan Kelly Savella, [place]  Office # [phone]    Follow Up:  Syncope, Pleural Effusion    Subjective/Observations: Sitting on the chair s/p right thora, tolerated procedure well.  Denies SOB, WOOTEN or orthopnea.  Denies CP or palpitaitons    REVIEW OF SYSTEMS: All other review of systems is negative unless indicated above  PAST MEDICAL & SURGICAL HISTORY:  Hypertension  Hyperlipidemia  Diabetes mellitus  Type 1 on insulin pump  CKD (chronic kidney disease)  Renal Transplant 2013 at [facility]  Diabetic peripheral neuropathy  Obesity (BMI 30-39.9)  CVA (cerebral vascular accident)  Wallenberg Stroke  low L body sensation  low R face sensation  decreased peripheral vision  poor balance  Squamous cell carcinoma of skin of left ear  History of DVT (deep vein thrombosis)  s/p eliquis  Recurrent squamous cell carcinoma of skin  nose, L arm  Toe infection  2007 L 4th Toe surgery-amputation secondary to osteomyelitis  Ear disease  Left inner ear surgery, 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, Can NOT have MRI  Vasectomy status  s/p b/l  vasectomy  H/O foot surgery  2005 - right foot - bone fracture - s/p ORIF and subsequent removal of hardware  End-stage renal failure with renal transplant  12/2013  S/P kidney transplant  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    MEDICATIONS  (STANDING):  aspirin enteric coated 325 milliGRAM(s) Oral daily  atorvastatin 10 milliGRAM(s) Oral at bedtime  azaTHIOprine 100 milliGRAM(s) Oral daily  cloNIDine 0.1 milliGRAM(s) Oral two times a day  dextrose 5%. 1000 milliLiter(s) (100 mL/Hr) IV Continuous <Continuous>  dextrose 5%. 1000 milliLiter(s) (50 mL/Hr) IV Continuous <Continuous>  dextrose 50% Injectable 25 Gram(s) IV Push once  dextrose 50% Injectable 12.5 Gram(s) IV Push once  dextrose 50% Injectable 25 Gram(s) IV Push once  gabapentin 300 milliGRAM(s) Oral two times a day  glucagon  Injectable 1 milliGRAM(s) IntraMuscular once  hydrALAZINE 75 milliGRAM(s) Oral two times a day  insulin lispro (HumaLOG) Pump 1 Each SubCutaneous Continuous Pump  losartan 50 milliGRAM(s) Oral daily  metoprolol tartrate 25 milliGRAM(s) Oral three times a day  tacrolimus 1.5 milliGRAM(s) Oral every 12 hours  trimethoprim   80 mG/sulfamethoxazole 400 mG 1 Tablet(s) Oral daily    MEDICATIONS  (PRN):  dextrose Oral Gel 15 Gram(s) Oral once PRN Blood Glucose LESS THAN 70 milliGRAM(s)/deciliter  melatonin 3 milliGRAM(s) Oral at bedtime PRN Insomnia    Allergies    No Known Allergies    Intolerances    Vital Signs Last 24 Hrs  T(C): 36.5 (15 Aug 2022 13:35), Max: 36.9 (14 Aug 2022 20:50)  T(F): 97.7 (15 Aug 2022 13:35), Max: 98.4 (14 Aug 2022 20:50)  HR: 80 (15 Aug 2022 13:35) (70 - 82)  BP: 170/68 (15 Aug 2022 13:35) (132/67 - 174/74)  BP(mean): --  RR: 18 (15 Aug 2022 13:35) (16 - 18)  SpO2: 97% (15 Aug 2022 13:35) (92% - 97%)    Parameters below as of 15 Aug 2022 13:35  Patient On (Oxygen Delivery Method): nasal cannula    I&O's Summary    15 Aug 2022 07:01  -  15 Aug 2022 16:08  --------------------------------------------------------  IN: 0 mL / OUT: 1000 mL / NET: -1000 mL     PHYSICAL EXAM:  TELE: SR/SB  Constitutional: NAD, awake and alert, morbidly obese  HEENT: Moist Mucous Membranes, Anicteric  Pulmonary: Non-labored, breath sounds are clear bilaterally but diminished on right, No wheezing, rales or rhonchi  Cardiovascular: Regular, S1 and S2, No murmurs, rubs, gallops or clicks  Gastrointestinal: Bowel Sounds present, soft, nontender.   Lymph: 1-2 +BLE edema. No lymphadenopathy.  Skin: No visible rashes. right foot ulcer with dressing dry and intact  Psych:  Mood & affect appropriate  LABS: All Labs Reviewed:                        14.3   x     )-----------( x        ( 15 Aug 2022 15:02 )             44.7                         13.5   6.41  )-----------( 213      ( 15 Aug 2022 05:50 )             43.1                         14.5   6.46  )-----------( 227      ( 14 Aug 2022 06:29 )             45.9     15 Aug 2022 05:50    140    |  101    |  20     ----------------------------<  135    4.3     |  37     |  1.20   14 Aug 2022 06:29    141    |  102    |  17     ----------------------------<  111    4.6     |  36     |  1.00   13 Aug 2022 06:01    141    |  104    |  18     ----------------------------<  125    4.6     |  35     |  1.10     Ca    9.6        15 Aug 2022 05:50  Ca    9.4        14 Aug 2022 06:29  Ca    9.4        13 Aug 2022 06:01    Patient name: LUTHER NORIEGA  YOB: 1965   Age: 51 (M)   MR#: [id number]  Study Date: 7/27/2017  Location: O/PSonographer: Susie Zuleta RDCS  Study quality: Technically difficult  Referring Physician: Kashmir Ochoa MD  Blood Pressure: 150/77 mmHg  Height: 185 cm  Weight: 136 kg  BSA: 2.6 m2  ------------------------------------------------------------------------  PROCEDURE: Transthoracic echocardiogram with 2-D, M-Mode  and complete spectral and color flow Doppler. Intravenous  catheter inserted. Verbal consent was obtained for  injection of echo contrast following a discussion of risks  and benefits. Following intravenous injection of contrast,  harmonic imaging was performed.  INDICATION: Primary pulmonary hypertension(I27.0)  ------------------------------------------------------------------------  Dimensions:    Normal Values:  LA:     4.0    2.0 - 4.0 cm  Ao:     3.5    2.0 - 3.8 cm  SEPTUM: 1.1    0.6 - 1.2 cm  PWT:    1.2    0.6 - 1.1 cm  LVIDd:  4.9    3.0 -5.6 cm  LVIDs:  3.3    1.8 - 4.0 cm  Derived variables:  LVMI: 83 g/m2  RWT: 0.48  Fractional short: 33 %  EF (Teicholtz): 61 %  ------------------------------------------------------------------------  Observations:  Mitral Valve: Normal mitral valve. Minimal mitral  regurgitation.  Aortic Valve/Aorta: Normal trileaflet aortic valve.  Aortic Root: 3.5 cm.  Left Atrium: Normal left atrium.  LA volume index = 20  cc/m2.  Left Ventricle: Normal left ventricular systolic function.  No segmental wallmotion abnormalities. Endocardial  visualization enhanced with intravenous injection of echo  contrast (Definity). Increased relative wall thickness with  normal left ventricular mass index, consistent with  concentric left ventricular remodeling.  Right Heart: Normal right atrium. Right ventricular  enlargement with normal right ventricular systolic  function. Normal tricuspid valve. Minimal tricuspid  regurgitation. Normal pulmonic valve.  Pericardium/Pleura: Normal pericardium with no pericardial  effusion.  Hemodynamic: Estimated right atrial pressure is 8 mm Hg.  Estimated right ventricular systolic pressure equals 29 mm  Hg, assuming right atrial pressure equals 8 mm Hg,  consistent with normal pulmonary pressures.  ------------------------------------------------------------------------  Conclusions:  1. Increased relative wall thickness with normal left  ventricular mass index, consistent with concentric left  ventricular remodeling.  2. Normal left ventricular systolic function. No segmental  wall motion abnormalities. Endocardial visualization  enhanced with intravenous injection of echo contrast  (Definity).  ------------------------------------------------------------------------  Confirmed on  7/27/2017 - 13:49:24 by RASHEED Del Cid  ------------------------------------------------------------------------    ACC: [id number] EXAM:  CT CHEST                          PROCEDURE DATE:  08/12/2022          INTERPRETATION:  INDICATION: pleural effusion  TECHNIQUE: Unenhanced CT of the chest. Coronal, sagittal, and MIP images   were reconstructed and reviewed.  COMPARISON: 2/8/2022 chest CT.    FINDINGS:    AIRWAYS, LUNGS and PLEURA: Patent central airways. Large right pleural   effusion and atelectasis of right lower lobe. The lungs are otherwise   clear.    MEDIASTINUM AND JES: No lymphadenopathy.    HEART AND VESSELS: Heart size is normal. No pericardial effusion.   Thoracic aorta and pulmonary artery normal in diameter. Mild coronary   calcification.    VISUALIZED UPPER ABDOMEN: Lateral renal atrophy.    CHEST WALL AND BONES: No aggressive osseous lesion. Subcutaneous loop   recorder.    LOWER NECK: Within normal limits.    IMPRESSION:    Large right pleural effusion and atelectasis of right lower lobe.    --- End of Report ---    TRI KOCH MD; Attending Radiologist  This document has been electronically signed. Aug 12 2022  5:01PM    Ventricular Rate 87 BPM    Atrial Rate 87 BPM    P-R Interval 212 ms    QRS Duration 86 ms    Q-T Interval 378 ms    QTC Calculation(Bazett) 454 ms    P Axis 32 degrees    R Axis -13 degrees    T Axis 33 degrees    Diagnosis Line Sinus rhythm with 1st degree AV block with premature atrial complexes with aberrant conduction  Nonspecific ST abnormality  Abnormal ECG  When compared with ECG of 11-MAY-2022 08:49,  aberrant conduction is now present  Confirmed by Carolyn Thompson ([id number]) on 8/13/2022 11:34:45 AM

## 2022-08-15 NOTE — PROGRESS NOTE ADULT - PROBLEM/PLAN-3
Clifton Martinez is a 65 year old male presenting for a preventative exam.    He also has a history of hypertension, hyperlipidemia, gout, GERD, BPH and chronic interstitial cystitis. He does follow with urology and feels that symptoms have improved. No chest pain, dyspnea of fatigue. He plans to retire from his job in the next few months.     Health Maintenance Screenings were last performed:   Fasting glucose:   Glucose (mg/dL)   Date Value   02/08/2020 135 (H)   12/16/2019 97      Fasting Cholesterol:  CALCULATED LDL (mg/dL)   Date Value   12/16/2019 143 (H)       HDL (mg/dL)   Date Value   12/16/2019 37 (L)       TRIGLYCERIDE (mg/dL)   Date Value   12/16/2019 151 (H)       CHOLESTEROL (mg/dL)   Date Value   12/16/2019 210 (H)       Colonoscopy: Colonoscopy completed 4 years ago.  Next Colonoscopy in 0 years.  Eye Exam: every 6 months  Dental Visit: every 6 months  Tetanus Immunization: 2015    Diet is described as fair, and exercise routine consists of no regular routine.  His Body mass index is 32.85 kg/m²., and weight has been stable over the past year.        Wt Readings from Last 4 Encounters:   01/22/21 112.9 kg   01/19/21 112 kg   06/05/20 112 kg   12/23/19 112.9 kg       Current Outpatient Medications   Medication Sig Dispense Refill   • cholestyramine (QUESTRAN) 4 GM/DOSE powder Take 4 g by mouth daily (with breakfast). 378 g 11   • dicyclomine (BENTYL) 10 MG capsule Take 1 capsule by mouth once daily 90 capsule 3   • lisinopril (ZESTRIL) 10 MG tablet Take 1 tablet by mouth once daily 90 tablet 3   • allopurinol (ZYLOPRIM) 300 MG tablet Take 1 tablet by mouth once daily 90 tablet 3   • metoPROLOL succinate (TOPROL-XL) 50 MG 24 hr tablet Take 1 tablet by mouth once daily 90 tablet 3   • pentosan polysulfate (ELMIRON) 100 MG capsule Take 2 capsules by mouth 2 times daily. 360 capsule 3   • finasteride (PROSCAR) 5 MG tablet Take 1 tablet by mouth daily. 90 tablet 3   • Multiple Vitamins-Minerals (MENS 50+ 
MULTI VITAMIN/MIN PO) Take 1 tablet by mouth daily.     • esomeprazole (NEXIUM) 20 MG capsule Take 20 mg by mouth daily (before breakfast).  1 capsule 0     No current facility-administered medications for this visit.        ALLERGIES:  No Known Allergies     Past Medical History:   Diagnosis Date   • Allergic rhinitis    • GERD (gastroesophageal reflux disease)    • HTN (hypertension)    • Hyperlipidemia    • IC (interstitial cystitis)    • PONV (postoperative nausea and vomiting)        Past Surgical History:   Procedure Laterality Date   • Abdomen surgery     • Colonoscopy  11/17/2016    Per Dr. Teresa repeat in 3 years.    • Colonoscopy w/ biopsies and polypectomy  02/19/2007   • Cystoscopy  08/14/2019; 03/2019   • Cystostomy w/ retrograde ureteroscopy     • Esophagogastroduodenoscopy  04/06/2017    repeat 6 months in Oct 2017 per Dr. Cash   • Esophagogastroduodenoscopy  02/01/2018    Dr. Hernandez-Upper endoscopy for surveillance in 1 year   • Esophagogastroduodenoscopy transoral flex w/bx single or mult  02/19/2007    EGD with Bx   • Hernia repair      as child   • Prostate surgery  10/19/2018    Urolift procedure   • Urolift transprostatic implant procedure  10/19/2018    Dr. Darnell Castro       Family History   Problem Relation Age of Onset   • Dementia/Alzheimers Mother    • Stroke Father    • High blood pressure Father    • Hypertension Father    • Coronary Artery Disease Father        Review of Systems:  Constitution: Patient states that he has been in overall good health over the course of the past year.   Eyes: Denies any visual disturbances, eye discomfort, drainage, or redness.   Ears: No hearing deficits reported. No pain or ear drainage.  Nose: Denies epistaxis, chronic congestion, or postnasal drip.  Mouth:  No difficulty with chewing or swallowing. No report of oral lesions.  Throat: Denies sore throat or change in voice quality.  Neck: No stiffness or pain in the neck  Cardiovascular: Has not had 
DISPLAY PLAN FREE TEXT
any chest pain, palpitations, or pedal edema  Pulmonary: Denies cough, dyspnea, or recent respiratory infection. Sleeps well at night. Gastrointestinal: No change in bowel pattern. Denies melena or hematochezia.   Genitourinary: No dysuria, hematuria or incontinence.  Neurologic: Has not had any trouble with balance or coordination. No significant loss of memory reported, or tremor.  Musculoskeletal: Denies any joint stiffness or swelling, myalgias or bone pain.   Psychiatric: Satisfied with his current mood. No reported issues with depression or anxiety.    PHYSICAL EXAM: Pleasant 65 year old male presents with normal mood and affect. He is able to answer all questions asked of him with appropriate answers.     VITAL SIGNS     Vitals:    01/22/21 0815   BP: 124/80   BP Location: [facility] [place] - Left upper extremity   Patient Position: Sitting   Cuff Size: Regular   Pulse: 64   Temp: 98 °F (36.7 °C)   TempSrc: Temporal   Weight: 112.9 kg     EYES:  Conjunctiva is clear and non-injected. Pupils equal, round, reactive to light and accommodation.   ENMT:  Pinna without masses, lesions, scaling or discharge. TM's clear bilaterally, with normal light reflex noted. Auditory acuity appears adequate. No nasal deformities noted and mucosa is pink and noncongested appearing. Oropharynx reveals mucosa and gingivae that is pink and moist. Posterior pharynx without erythema or exudate. Tongue protrudes midline without tremor.   NECK: supple, no anterior or posterior lymphadenopathy appreciated. Thyroid does not appear enlarged.  RESPIRATORY:  Respirations are easy and unlabored.  Lungs are clear to auscultation throughout all fields. CARDIOVASCULAR:  Normal S1 and S2, without murmer, thrill or heave. No pedal edema noted. Carotid arteries without bruits.   ABDOMEN:  Soft and nontender to palpation. No masses or organomegaly noted. Bowel sounds are normoactive.   MUSCULOSKELETAL:  Vertebral column without abnormal curvature. Gait is steady 
and even without weakness or ataxia. Upper and lower extremities appear to have full range of motion that is even and symmetrical.   NEUROLOGICAL: Cranial nerves 2-12 are grossly intact, Bilateral popliteal reflexes are  2+and symmetrical.  SKIN: Warm, moist and without erythema, rash, or lesions. Turgor appears good.      ASSESSMENT:     1. Visit for preventive health examination    2. Essential hypertension    3. Mixed hyperlipidemia    4. Idiopathic gout, unspecified chronicity, unspecified site    5. Gastroesophageal reflux disease without esophagitis    6. Prostate cancer screening    7. Benign prostatic hyperplasia with urinary obstruction and other lower urinary tract symptoms    8. Interstitial cystitis           PLAN:     1.  Recommend 30 minutes of aerobic exercise most days of week along with a healthful diet. Limit soda intake as well as fast or fried foods.  Aim for at least 8 hours of sleep per night, and strive to drink at least 8 glasses of water daily to maintain good hydration.   2.  Recommend regular ophthalmology examination and dental care.  3.  Recommend annual influenza immunization - this was previously recieved.  4.  Shingles vaccine if able. He will check on insurance  5.  Referral for colon cancer screening  6.  Work on diet/exercise and weight reduction  7.  Fasting labs will be updated today and medications adjusted accordingly at that time if needed.    Approximately 30 minutes are spent today performing chart review, assessment of the patient, treatment planning and discussion and documentation of the visit.       Return in about 1 year (around 1/22/2022) for Medicare Wellness Exam and medication check, Fasting labs prior to next visit.       
DISPLAY PLAN FREE TEXT
DISPLAY PLAN FREE TEXT

## 2022-08-15 NOTE — PROGRESS NOTE ADULT - PROBLEM SELECTOR PLAN 10
VTE ppx: start Heparin 5000u SQ q8hrs VTE ppx: Heparin 5000u SQ q8hrs, held this AM for thoracentesis, will restart tmrw AM

## 2022-08-15 NOTE — PROGRESS NOTE ADULT - SUBJECTIVE AND OBJECTIVE BOX
Date/Time Patient Seen:  		  Referring MD:   Data Reviewed	       Patient is a 56y old  Male who presents with a chief complaint of LOC, R pleural effusion (14 Aug 2022 15:38)      Subjective/HPI     PAST MEDICAL & SURGICAL HISTORY:  Hypertension    Hyperlipidemia    Diabetes mellitus  Type 1 on insulin pump    CKD (chronic kidney disease)  Renal Transplant 2013 at [facility]    Diabetic peripheral neuropathy    Obesity (BMI 30-39.9)    CVA (cerebral vascular accident)  Wallenberg Stroke  low L body sensation  low R face sensation  decreased peripheral vision  poor balance    Squamous cell carcinoma of skin of left ear    History of DVT (deep vein thrombosis)  s/p eliquis    Recurrent squamous cell carcinoma of skin  nose, L arm    Toe infection  2007 L 4th Toe surgery-amputation secondary to osteomyelitis    Ear disease  Left inner ear surgery, 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 1000 milliLiter(s) (100 mL/Hr) IV Continuous <Continuous>  dextrose 5%. 1000 milliLiter(s) (50 mL/Hr) IV Continuous <Continuous>  dextrose 50% Injectable 25 Gram(s) IV Push once  dextrose 50% Injectable 12.5 Gram(s) IV Push once  dextrose 50% Injectable 25 Gram(s) IV Push once  gabapentin 300 milliGRAM(s) Oral two times a day  glucagon  Injectable 1 milliGRAM(s) IntraMuscular once  hydrALAZINE 75 milliGRAM(s) Oral two times a day  insulin lispro (HumaLOG) Pump 1 Each SubCutaneous Continuous Pump  losartan 50 milliGRAM(s) Oral daily  metoprolol tartrate 25 milliGRAM(s) Oral three times a day  tacrolimus 1.5 milliGRAM(s) Oral every 12 hours  trimethoprim   80 mG/sulfamethoxazole 400 mG 1 Tablet(s) Oral daily    MEDICATIONS  (PRN):  dextrose Oral Gel 15 Gram(s) Oral once PRN Blood Glucose LESS THAN 70 milliGRAM(s)/deciliter  melatonin 3 milliGRAM(s) Oral at bedtime PRN Insomnia         Vitals log        ICU Vital Signs Last 24 Hrs  T(C): 36.6 (15 Aug 2022 05:04), Max: 37.3 (14 Aug 2022 13:19)  T(F): 97.8 (15 Aug 2022 05:04), Max: 99.2 (14 Aug 2022 13:19)  HR: 70 (15 Aug 2022 05:04) (70 - 82)  BP: 132/67 (15 Aug 2022 05:04) (132/67 - 174/74)  BP(mean): --  ABP: --  ABP(mean): --  RR: 18 (15 Aug 2022 05:04) (17 - 18)  SpO2: 92% (15 Aug 2022 05:04) (92% - 94%)    O2 Parameters below as of 15 Aug 2022 05:04  Patient On (Oxygen Delivery Method): nasal cannula  O2 Flow (L/min): 2               Input and Output:  I&O's Detail    13 Aug 2022 07:01  -  14 Aug 2022 07:00  --------------------------------------------------------  IN:  Total IN: 0 mL    OUT:    Voided (mL): 800 mL  Total OUT: 800 mL    Total NET: -800 mL          Lab Data                        14.5   6.46  )-----------( 227      ( 14 Aug 2022 06:29 )             45.9     08-14    141  |  102  |  17  ----------------------------<  111<H>  4.6   |  36<H>  |  1.00    Ca    9.4      14 Aug 2022 06:29              Review of Systems	      Objective     Physical Examination    heart s1s2  lung dec BS  abd soft      Pertinent Lab findings & Imaging      Eli:  NO   Adequate UO     I&O's Detail    13 Aug 2022 07:01  -  14 Aug 2022 07:00  --------------------------------------------------------  IN:  Total IN: 0 mL    OUT:    Voided (mL): 800 mL  Total OUT: 800 mL    Total NET: -800 mL               Discussed with:     Cultures:	        Radiology

## 2022-08-15 NOTE — PROGRESS NOTE ADULT - ASSESSMENT
CKD 3, h/o Kidney transplant 2013 [facility]  Diabetes, Diabetic foot ulcer  Syncope  Hypertension  large right pleural effusion  Metabolic alkalosis, Likely secondary to chronic hypercapnea.    08/14/22: Renal indices at baseline. To continue current immuno suppressant regimen. check tacrolimus level.  Pulmonary evaluation. ? Thoracentesis. Podiatry follow up. Monitor blood sugar levels. Insulin coverage as needed. ? Elevated bicarb secondary to CO2 retention. May need to check abg if worsening. Monitor BP trend. Titrate BP meds as needed. Salt restriction. Will follow electrolytes and renal function trend.   08/15/22: Stable renal indices. Thoracentesis today. Pulmonary follow up.

## 2022-08-15 NOTE — CONSULT NOTE ADULT - ASSESSMENT
Physical Exam:   Vital Signs Last 24 Hrs  T(C): 36.5 (15 Aug 2022 13:35), Max: 36.9 (14 Aug 2022 20:50)  T(F): 97.7 (15 Aug 2022 13:35), Max: 98.4 (14 Aug 2022 20:50)  HR: 80 (15 Aug 2022 13:35) (70 - 82)  BP: 170/68 (15 Aug 2022 13:35) (132/67 - 174/74)  BP(mean): --  RR: 18 (15 Aug 2022 13:35) (16 - 18)  SpO2: 97% (15 Aug 2022 13:35) (92% - 97%)    Parameters below as of 15 Aug 2022 13:35  Patient On (Oxygen Delivery Method): nasal cannula        General: NAD, denies Fever, chills  CVS: S1S2 no M/R/G  Resp: CTA in all fields  : no freq, no urgency, no dysuria  Extremeties: no edema, + pulses         CAPILLARY BLOOD GLUCOSE      POCT Blood Glucose.: 201 mg/dL (14 Aug 2022 21:09)  POCT Blood Glucose.: 197 mg/dL (14 Aug 2022 17:17)      Cholesterol, Serum: 113 mg/dL (05.19.21 @ 08:36)     HDL Cholesterol, Serum: 22 mg/dL (05.19.21 @ 08:36)     LDL Cholesterol Calculated: 66 mg/dL (05.19.21 @ 08:36)     DIET: CC  >50%         Physical Exam:   Vital Signs Last 24 Hrs  T(C): 36.5 (15 Aug 2022 13:35), Max: 36.9 (14 Aug 2022 20:50)  T(F): 97.7 (15 Aug 2022 13:35), Max: 98.4 (14 Aug 2022 20:50)  HR: 80 (15 Aug 2022 13:35) (70 - 82)  BP: 170/68 (15 Aug 2022 13:35) (132/67 - 174/74)  BP(mean): --  RR: 18 (15 Aug 2022 13:35) (16 - 18)  SpO2: 97% (15 Aug 2022 13:35) (92% - 97%)    Parameters below as of 15 Aug 2022 13:35  Patient On (Oxygen Delivery Method): nasal cannula        General: NAD, denies Fever, chills  CVS: S1S2 no M/R/G  Resp: CTA in all fields, RLL diminished  : no freq, no urgency, no dysuria  Extremeties: no edema, + pulses         CAPILLARY BLOOD GLUCOSE      POCT Blood Glucose.: 201 mg/dL (14 Aug 2022 21:09)  POCT Blood Glucose.: 197 mg/dL (14 Aug 2022 17:17)      Cholesterol, Serum: 113 mg/dL (05.19.21 @ 08:36)     HDL Cholesterol, Serum: 22 mg/dL (05.19.21 @ 08:36)     LDL Cholesterol Calculated: 66 mg/dL (05.19.21 @ 08:36)     DIET: CC  >50%

## 2022-08-15 NOTE — PROGRESS NOTE ADULT - SUBJECTIVE AND OBJECTIVE BOX
Patient is a 56y old  Male who presents with a chief complaint of LOC, R pleural effusion.      INTERVAL HPI/OVERNIGHT EVENTS: Patient seen and examined at bedside. No overnight events occurred. Patient has no complaints at this time. Pt went for R thoracentesis this morning with IR, 1L clear yellow fluid drained, wet read on repeat CXR after procedure still appears to have RLL effusion but improved from prior. Denies fevers, chills, headache, lightheadedness, chest pain, dyspnea, abdominal pain, n/v/d/c.    MEDICATIONS  (STANDING):  aspirin enteric coated 325 milliGRAM(s) Oral daily  atorvastatin 10 milliGRAM(s) Oral at bedtime  azaTHIOprine 100 milliGRAM(s) Oral daily  cloNIDine 0.1 milliGRAM(s) Oral two times a day  dextrose 5%. 1000 milliLiter(s) (100 mL/Hr) IV Continuous <Continuous>  dextrose 5%. 1000 milliLiter(s) (50 mL/Hr) IV Continuous <Continuous>  dextrose 50% Injectable 25 Gram(s) IV Push once  dextrose 50% Injectable 12.5 Gram(s) IV Push once  dextrose 50% Injectable 25 Gram(s) IV Push once  gabapentin 300 milliGRAM(s) Oral two times a day  glucagon  Injectable 1 milliGRAM(s) IntraMuscular once  hydrALAZINE 75 milliGRAM(s) Oral two times a day  insulin lispro (HumaLOG) Pump 1 Each SubCutaneous Continuous Pump  losartan 50 milliGRAM(s) Oral daily  metoprolol tartrate 25 milliGRAM(s) Oral three times a day  tacrolimus 1.5 milliGRAM(s) Oral every 12 hours  trimethoprim   80 mG/sulfamethoxazole 400 mG 1 Tablet(s) Oral daily    MEDICATIONS  (PRN):  dextrose Oral Gel 15 Gram(s) Oral once PRN Blood Glucose LESS THAN 70 milliGRAM(s)/deciliter  melatonin 3 milliGRAM(s) Oral at bedtime PRN Insomnia      Allergies    No Known Allergies    Intolerances        REVIEW OF SYSTEMS:  CONSTITUTIONAL: No fever or chills  HEENT:  No headache, no sore throat  RESPIRATORY: No cough, wheezing, or shortness of breath  CARDIOVASCULAR: No chest pain, palpitations  GASTROINTESTINAL: No abd pain, nausea, vomiting, or diarrhea  GENITOURINARY: No dysuria, frequency, or hematuria  NEUROLOGICAL: no focal weakness or dizziness  MUSCULOSKELETAL: no myalgias     T(C): 36.5 (08-15-22 @ 13:35), Max: 36.9 (08-14-22 @ 20:50)  HR: 80 (08-15-22 @ 13:35) (70 - 82)  BP: 170/68 (08-15-22 @ 13:35) (132/67 - 174/74)  RR: 18 (08-15-22 @ 13:35) (16 - 18)  SpO2: 97% (08-15-22 @ 13:35) (92% - 97%)    Physical Exam:   GENERAL: awake and alert, NAD  HEENT: head NC/AT; EOM intact, conjunctiva & sclera clear  RESPIRATORY: CTA B/L, no wheezing, rales, rhonchi or rubs  CARDIOVASCULAR: S1&S2, RRR, no murmurs or gallops  ABDOMEN: soft, non-tender, non-distended, + Bowel sounds x4 quadrants  MUSCULOSKELETAL:  no clubbing, cyanosis or edema of all 4 extremities. +RLE dressing on foot clean, dry, intact  VASCULAR: Radial pulses 2+ bilaterally, no varicose veins   SKIN: warm and dry, color normal  NEUROLOGIC: AA&O X3, grossly intact w/ no focal deficits  Psych: Normal mood and affect, normal behavior          LABS:                        13.5   6.41  )-----------( 213      ( 15 Aug 2022 05:50 )             43.1     CBC Full  -  ( 15 Aug 2022 05:50 )  WBC Count : 6.41 K/uL  Hemoglobin : 13.5 g/dL  Hematocrit : 43.1 %  Platelet Count - Automated : 213 K/uL  Mean Cell Volume : 89.0 fl  Mean Cell Hemoglobin : 27.9 pg  Mean Cell Hemoglobin Concentration : 31.3 gm/dL  Auto Neutrophil # : 4.43 K/uL  Auto Lymphocyte # : 1.13 K/uL  Auto Monocyte # : 0.59 K/uL  Auto Eosinophil # : 0.20 K/uL  Auto Basophil # : 0.04 K/uL  Auto Neutrophil % : 69.2 %  Auto Lymphocyte % : 17.6 %  Auto Monocyte % : 9.2 %  Auto Eosinophil % : 3.1 %  Auto Basophil % : 0.6 %    15 Aug 2022 05:50    140    |  101    |  20     ----------------------------<  135    4.3     |  37     |  1.20     Ca    9.6        15 Aug 2022 05:50          CAPILLARY BLOOD GLUCOSE      POCT Blood Glucose.: 201 mg/dL (14 Aug 2022 21:09)  POCT Blood Glucose.: 197 mg/dL (14 Aug 2022 17:17)          RADIOLOGY & ADDITIONAL TESTS:    XR CHEST PORTABLE URGENT 1V                          PROCEDURE DATE:  08/12/2022          INTERPRETATION:  AP chest on August 12, 2022 at 11:04 AM. Patient had a   syncopal episode.    Heart normal for projection. Left loop recorder again noted.    On May 10 there is a slight right base effusion.    There is presently a moderate to early large right effusion.    IMPRESSION: Increasing right effusion.      Noncon CT chest 8/12/22    IMPRESSION:    Large right pleural effusion and atelectasis of right lower lobe.      - Repear CXR 8/15/22 s/p thoracentesis pending official read. R pleural effusion appears improved from prior imaging,    Personally reviewed.     Consultant(s) Notes Reviewed:  [x] YES  [ ] NO

## 2022-08-15 NOTE — PROGRESS NOTE ADULT - PROBLEM SELECTOR PLAN 9
Chronic, continue home statin with therapeutic interchange Atorvastatin 10mg po qd

## 2022-08-15 NOTE — PROGRESS NOTE ADULT - PROBLEM SELECTOR PLAN 5
Chronic   - Hemoglobin A1C 8.6   - Continue home insulin pump  - Diabetic NP Pat Weil consulted due to insulin pump

## 2022-08-15 NOTE — PROGRESS NOTE ADULT - SUBJECTIVE AND OBJECTIVE BOX
Neurology follow up note    LUTHER ANDREWSO56yMale      Interval History:    Patient feels ok no new complaints.    Allergies    No Known Allergies    Intolerances        MEDICATIONS    aspirin enteric coated 325 milliGRAM(s) Oral daily  atorvastatin 10 milliGRAM(s) Oral at bedtime  azaTHIOprine 100 milliGRAM(s) Oral daily  cloNIDine 0.1 milliGRAM(s) Oral two times a day  dextrose 5%. 1000 milliLiter(s) IV Continuous <Continuous>  dextrose 5%. 1000 milliLiter(s) IV Continuous <Continuous>  dextrose 50% Injectable 25 Gram(s) IV Push once  dextrose 50% Injectable 12.5 Gram(s) IV Push once  dextrose 50% Injectable 25 Gram(s) IV Push once  dextrose Oral Gel 15 Gram(s) Oral once PRN  gabapentin 300 milliGRAM(s) Oral two times a day  glucagon  Injectable 1 milliGRAM(s) IntraMuscular once  hydrALAZINE 75 milliGRAM(s) Oral two times a day  insulin lispro (HumaLOG) Pump 1 Each SubCutaneous Continuous Pump  losartan 50 milliGRAM(s) Oral daily  melatonin 3 milliGRAM(s) Oral at bedtime PRN  metoprolol tartrate 25 milliGRAM(s) Oral three times a day  tacrolimus 1.5 milliGRAM(s) Oral every 12 hours  trimethoprim   80 mG/sulfamethoxazole 400 mG 1 Tablet(s) Oral daily              Vital Signs Last 24 Hrs  T(C): 36.6 (15 Aug 2022 05:04), Max: 37.3 (14 Aug 2022 13:19)  T(F): 97.8 (15 Aug 2022 05:04), Max: 99.2 (14 Aug 2022 13:19)  HR: 70 (15 Aug 2022 05:04) (70 - 82)  BP: 132/67 (15 Aug 2022 05:04) (132/67 - 174/74)  BP(mean): --  RR: 18 (15 Aug 2022 05:04) (17 - 18)  SpO2: 92% (15 Aug 2022 05:04) (92% - 94%)    Parameters below as of 15 Aug 2022 05:04  Patient On (Oxygen Delivery Method): nasal cannula  O2 Flow (L/min): 2      REVIEW OF SYSTEMS:  Constitutional:  The patient denies fever, chills, or night sweats.  Head:  No headache.  Eyes:  No double vision or blurry vision.  Ears:  No ringing in his ears.  Neck:  No neck pain.  Respiratory:  No shortness of breath.  Cardiovascular:  No chest pain.  Abdomen:  No nausea, vomiting, or abdominal pain.  Extremities/Neurological:  Decreased sensation on the left side, which is not new.    PHYSICAL EXAMINATION:   HEENT:  Head:  Normocephalic, atraumatic.  Eyes:  No scleral icterus.  Ears:  Hearing bilaterally intact.  NECK:  Supple.  RESPIRATORY:  Good air entry bilaterally.  CARDIOVASCULAR:  S1 and S2 heard.  ABDOMEN:  Soft and nontender.  EXTREMITIES:  No clubbing or cyanosis were noted.      NEUROLOGIC:  The patient is awake, alert, and oriented x3.  Extraocular movements were intact.  Pupils were equal, round, and reactive bilaterally 3 mm to 2 mm.  Speech was fluent.  Smile symmetric.  Motor:  Bilateral upper 5/5, bilateral lower 4+/5.  Sensory:  Has decreased light touch to entire left side, which is not new.  The patient has problems with his right eye, which is old, not new.                LABS:  CBC Full  -  ( 15 Aug 2022 05:50 )  WBC Count : 6.41 K/uL  RBC Count : 4.84 M/uL  Hemoglobin : 13.5 g/dL  Hematocrit : 43.1 %  Platelet Count - Automated : 213 K/uL  Mean Cell Volume : 89.0 fl  Mean Cell Hemoglobin : 27.9 pg  Mean Cell Hemoglobin Concentration : 31.3 gm/dL  Auto Neutrophil # : 4.43 K/uL  Auto Lymphocyte # : 1.13 K/uL  Auto Monocyte # : 0.59 K/uL  Auto Eosinophil # : 0.20 K/uL  Auto Basophil # : 0.04 K/uL  Auto Neutrophil % : 69.2 %  Auto Lymphocyte % : 17.6 %  Auto Monocyte % : 9.2 %  Auto Eosinophil % : 3.1 %  Auto Basophil % : 0.6 %      08-15    140  |  101  |  20  ----------------------------<  135<H>  4.3   |  37<H>  |  1.20    Ca    9.6      15 Aug 2022 05:50      Hemoglobin A1C:       Vitamin B12         RADIOLOGY      ANALYSIS AND PLAN:  This is a 56-year-old with episode of loss of consciousness and shaking.  For episode of loss of consciousness and shaking, suspect most likely this was syncope with convulsion stiffening.  Episode was brief.  Upon arrival by emergency room staff, the patient's blood pressure was 95, and the patient felt woozy prior to event.  The patient has no prior aura to suggest underlying epilepsy, does not see any flashing lights, any weird smell or weird taste.  No report of tongue bite marks or loss of urine upon awakening.  No report of shaking at night.  For history of cerebrovascular accident, continue the patient on antiplatelet.  For history of neuropathy, continue the patient on gabapentin.  For diabetes, strict control of blood sugars.  Cardiology followup.  Pulmonary follow up Pleurocentesis  no new events     Greater than 40 minutes of time was spent with the patient, plan of care, reviewing data, with greater than 50% of the visit was spent counseling and/or coordinating care with multidisciplinary healthcare team

## 2022-08-15 NOTE — PROGRESS NOTE ADULT - PROBLEM SELECTOR PROBLEM 6
Chronic UTI (urinary tract infection)

## 2022-08-15 NOTE — PROCEDURE NOTE - ADDITIONAL PROCEDURE DETAILS
Right thoracentesis with ultrasound guidance under sterile conditions, 1000cc obtained clear yellow. Patient clinically stable.  Follow up cxr.

## 2022-08-15 NOTE — PROGRESS NOTE ADULT - PROBLEM SELECTOR PLAN 4
Hx of renal transplant   - creatinine stable   -continue home tacrolimus 1.5mg po q12hrs and azathioprine 100mg po daily  -nephro following  Dr Bryant
Hx of renal transplant   -continue home tacrolimus 1.5mg po q12hrs and azathioprine 100mg po daily  -nephro following  Dr Bryant
Hx of renal transplant   - creatinine stable   -continue home tacrolimus 1.5mg po q12hrs and azathioprine 100mg po daily  -nephro following  Dr Bryant

## 2022-08-15 NOTE — PROGRESS NOTE ADULT - SUBJECTIVE AND OBJECTIVE BOX
Patient is a 56y old  Male who presents with a chief complaint of LOC, R pleural effusion (15 Aug 2022 09:10)    Patient seen in follow up for CKD.        PAST MEDICAL HISTORY:  Hypertension    Hyperlipidemia    Diabetes mellitus    CKD (chronic kidney disease)    Diabetic peripheral neuropathy    Obesity (BMI 30-39.9)    CVA (cerebral vascular accident)    Squamous cell carcinoma of skin of left ear    History of DVT (deep vein thrombosis)    Recurrent squamous cell carcinoma of skin      MEDICATIONS  (STANDING):  aspirin enteric coated 325 milliGRAM(s) Oral daily  atorvastatin 10 milliGRAM(s) Oral at bedtime  azaTHIOprine 100 milliGRAM(s) Oral daily  cloNIDine 0.1 milliGRAM(s) Oral two times a day  dextrose 5%. 1000 milliLiter(s) (100 mL/Hr) IV Continuous <Continuous>  dextrose 5%. 1000 milliLiter(s) (50 mL/Hr) IV Continuous <Continuous>  dextrose 50% Injectable 25 Gram(s) IV Push once  dextrose 50% Injectable 12.5 Gram(s) IV Push once  dextrose 50% Injectable 25 Gram(s) IV Push once  gabapentin 300 milliGRAM(s) Oral two times a day  glucagon  Injectable 1 milliGRAM(s) IntraMuscular once  hydrALAZINE 75 milliGRAM(s) Oral two times a day  insulin lispro (HumaLOG) Pump 1 Each SubCutaneous Continuous Pump  losartan 50 milliGRAM(s) Oral daily  metoprolol tartrate 25 milliGRAM(s) Oral three times a day  tacrolimus 1.5 milliGRAM(s) Oral every 12 hours  trimethoprim   80 mG/sulfamethoxazole 400 mG 1 Tablet(s) Oral daily    MEDICATIONS  (PRN):  dextrose Oral Gel 15 Gram(s) Oral once PRN Blood Glucose LESS THAN 70 milliGRAM(s)/deciliter  melatonin 3 milliGRAM(s) Oral at bedtime PRN Insomnia    T(C): 36.5 (08-15-22 @ 13:35), Max: 37.3 (08-14-22 @ 13:19)  HR: 80 (08-15-22 @ 13:35) (62 - 82)  BP: 170/68 (08-15-22 @ 13:35) (132/67 - 174/74)  RR: 18 (08-15-22 @ 13:35) (16 - 18)  SpO2: 97% (08-15-22 @ 13:35) (92% - 97%)  Wt(kg): --  I&O's Detail    15 Aug 2022 07:01  -  15 Aug 2022 13:36  --------------------------------------------------------  IN:  Total IN: 0 mL    OUT:    VAC (Vacuum Assisted Closure) System (mL): 1000 mL  Total OUT: 1000 mL    Total NET: -1000 mL          PHYSICAL EXAM:  General: No distress  Respiratory: b/l air entry  Cardiovascular: S1 S2  Gastrointestinal: soft  Extremities:  no edema                              13.5   6.41  )-----------( 213      ( 15 Aug 2022 05:50 )             43.1     08-15    140  |  101  |  20  ----------------------------<  135<H>  4.3   |  37<H>  |  1.20    Ca    9.6      15 Aug 2022 05:50                Sodium, Serum: 140 (08-15 @ 05:50)  Sodium, Serum: 141 (08-14 @ 06:29)  Sodium, Serum: 141 (08-13 @ 06:01)  Sodium, Serum: 139 (08-12 @ 10:20)    Creatinine, Serum: 1.20 (08-15 @ 05:50)  Creatinine, Serum: 1.00 (08-14 @ 06:29)  Creatinine, Serum: 1.10 (08-13 @ 06:01)  Creatinine, Serum: 1.00 (08-12 @ 10:20)    Potassium, Serum: 4.3 (08-15 @ 05:50)  Potassium, Serum: 4.6 (08-14 @ 06:29)  Potassium, Serum: 4.6 (08-13 @ 06:01)  Potassium, Serum: 4.3 (08-12 @ 10:20)    Hemoglobin: 13.5 (08-15 @ 05:50)  Hemoglobin: 14.5 (08-14 @ 06:29)  Hemoglobin: 13.4 (08-13 @ 06:01)  Hemoglobin: 13.9 (08-12 @ 10:20)

## 2022-08-15 NOTE — PROGRESS NOTE ADULT - ASSESSMENT
The patient is a 57 y/o M with pmhx HTN, HLD, T1DM (on insulin pump), CKD (s/p renal transplant), CVA, hx DVT (s/p Eliquis), CARMEN (on nocturnal CPAP), hx restrictive lung disease, squamous cell carcinoma, multiple toe amputations, presents to the ED from wound care with an episode of loss of concousness while receiving hyperbaric oxygen treatment. Found to have R pleural effusion.

## 2022-08-15 NOTE — PROGRESS NOTE ADULT - PROBLEM SELECTOR PLAN 6
Hx of chronic UTIs, continue home bactrim 400/80 mg po qd  - Afebrile, no evidence of leucocytosis

## 2022-08-15 NOTE — PROGRESS NOTE ADULT - PROBLEM SELECTOR PLAN 8
Hx of multiple toe amputations in the past 2/2 diabetic neuropathy  - Continue diabetic management as above
Hx of multiple toe amputations in the past 2/2 diabetic neuropathy  - Continue diabetic management as above
Hx of multiple toe amputations in the past 2/2 diabetic neuropathy  - Continue diabetic management as above  - wound care consult

## 2022-08-15 NOTE — PROGRESS NOTE ADULT - SUBJECTIVE AND OBJECTIVE BOX
CAPILLARY BLOOD GLUCOSE      POCT Blood Glucose.: 201 mg/dL (14 Aug 2022 21:09)  POCT Blood Glucose.: 197 mg/dL (14 Aug 2022 17:17)  POCT Blood Glucose.: 173 mg/dL (14 Aug 2022 12:02)      Vital Signs Last 24 Hrs  T(C): 36.6 (15 Aug 2022 05:04), Max: 37.3 (14 Aug 2022 13:19)  T(F): 97.8 (15 Aug 2022 05:04), Max: 99.2 (14 Aug 2022 13:19)  HR: 70 (15 Aug 2022 05:04) (70 - 82)  BP: 132/67 (15 Aug 2022 05:04) (132/67 - 174/74)  BP(mean): --  RR: 18 (15 Aug 2022 05:04) (17 - 18)  SpO2: 92% (15 Aug 2022 05:04) (92% - 94%)    Parameters below as of 15 Aug 2022 05:04  Patient On (Oxygen Delivery Method): nasal cannula  O2 Flow (L/min): 2      General: WN/WD NAD  Respiratory: CTA B/L  CV: RRR, S1S2, no murmurs, rubs or gallops  Abdominal: Soft, NT, ND +BS, Last BM  Extremities: No edema, + peripheral pulses     08-14    141  |  102  |  17  ----------------------------<  111<H>  4.6   |  36<H>  |  1.00    Ca    9.4      14 Aug 2022 06:29        atorvastatin 10 milliGRAM(s) Oral at bedtime  dextrose 50% Injectable 25 Gram(s) IV Push once  dextrose 50% Injectable 12.5 Gram(s) IV Push once  dextrose 50% Injectable 25 Gram(s) IV Push once  dextrose Oral Gel 15 Gram(s) Oral once PRN  glucagon  Injectable 1 milliGRAM(s) IntraMuscular once  insulin lispro (HumaLOG) Pump 1 Each SubCutaneous Continuous Pump

## 2022-08-15 NOTE — PROGRESS NOTE ADULT - PROBLEM SELECTOR PLAN 7
Hx of CVA in 2015  Patient with residual R ptosis and L pain/temp loss  CT Head negative for acute intracranial pathology

## 2022-08-15 NOTE — CONSULT NOTE ADULT - SUBJECTIVE AND OBJECTIVE BOX
Patient is a 56y old  Male who presents with a chief complaint of LOC, R pleural effusion (15 Aug 2022 13:36)    Type 2 DM DX age 26 years old. diabetic foot ulcers and neuropathy, receiving hyperbaric oxygen treatment, no hx DKA. managed by Endocrine Dr. Jasbir Garcia, Last seen 2 months ago, A1c 8.6%. pt uses minimed 640g CIIP, humalog 3 units  Glucometer checks, needs, weight, diet, exercise  diabetes education provided  Hx ASCVD, CKD, HF    HPI:  The patient is a 57 y/o M with pmhx HTN, HLD, T1DM (on insulin pump), CKD (s/p renal transplant), CVA (with residual L sided sensation loss, R eye lid drooping), hx DVT (s/p Eliquis), September 11 related lung disease, CARMEN (on nocturnal CPAP), hx restrictive lung disease, squamous cell carcinoma OM L 4th digit s/p amputation, R 2nd digit toe amputation, R 5th digit amputation - who presents to the ED from wound care with an episode of loss of concioussness while recieving hyperbaric oxygen treatment. The patient states that he does not remember losing conciousness, only remembers waking up and being told to have sugar pills. He states this has never happened to him before. Of note, patient was supposed to fly to [facility]. Upon being told he has a R pleural effusion, patient states that he has had one pleural effusion before, which was drained and produced 600cc of fluid 2 months ago, his pulmonolgist is Dr. Boyce. He recalls being told that the workup of the fluid showed no abnormalities.     ED course  T 98.3F, HR 90, /98, RR 18, SpO2 98% r/a  Labs significant for trop 19 -> 20, Glu 209  EKG 1st deg AV block with PVC, ?RBBB  Imaging  -CXR: increasing R effusion  -CT head: no acute pathology     (12 Aug 2022 13:23)      PAST MEDICAL & SURGICAL HISTORY:  Hypertension      Hyperlipidemia      Diabetes mellitus  Type 1 on insulin pump      CKD (chronic kidney disease)  Renal Transplant 2013 at [facility]      Diabetic peripheral neuropathy      Obesity (BMI 30-39.9)      CVA (cerebral vascular accident)  Wallenberg Stroke  low L body sensation  low R face sensation  decreased peripheral vision  poor balance      Squamous cell carcinoma of skin of left ear      History of DVT (deep vein thrombosis)  s/p eliquis      Recurrent squamous cell carcinoma of skin  nose, L arm      Toe infection  2007 L 4th Toe surgery-amputation secondary to osteomyelitis      Ear disease  Left inner ear surgery, 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, Can NOT have MRI      Vasectomy status  s/p b/l  vasectomy      H/O foot surgery  2005 - right foot - bone fracture - s/p ORIF and subsequent removal of hardware      End-stage renal failure with renal transplant  12/2013      S/P kidney transplant      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          REVIEW OF SYSTEMS  General:	as above  Respiratory: NAD, No SOB, no cough  Cardiovascular: No chest pain, no palpitations	  Endocrine: no polyuria, no polydipsia, or S/S of hypoglycemia  Neuro: denies numbess, no tingling	  Other:	      Allergies    No Known Allergies    Intolerances        MEDICATIONS  (STANDING):  aspirin enteric coated 325 milliGRAM(s) Oral daily  atorvastatin 10 milliGRAM(s) Oral at bedtime  azaTHIOprine 100 milliGRAM(s) Oral daily  cloNIDine 0.1 milliGRAM(s) Oral two times a day  dextrose 5%. 1000 milliLiter(s) (100 mL/Hr) IV Continuous <Continuous>  dextrose 5%. 1000 milliLiter(s) (50 mL/Hr) IV Continuous <Continuous>  dextrose 50% Injectable 25 Gram(s) IV Push once  dextrose 50% Injectable 12.5 Gram(s) IV Push once  dextrose 50% Injectable 25 Gram(s) IV Push once  gabapentin 300 milliGRAM(s) Oral two times a day  glucagon  Injectable 1 milliGRAM(s) IntraMuscular once  hydrALAZINE 75 milliGRAM(s) Oral two times a day  insulin lispro (HumaLOG) Pump 1 Each SubCutaneous Continuous Pump  losartan 50 milliGRAM(s) Oral daily  metoprolol tartrate 25 milliGRAM(s) Oral three times a day  tacrolimus 1.5 milliGRAM(s) Oral every 12 hours  trimethoprim   80 mG/sulfamethoxazole 400 mG 1 Tablet(s) Oral daily       Patient is a 56y old  Male who presents with a chief complaint of LOC, R pleural effusion (15 Aug 2022 13:36)    Type 2 DM DX age 26 years old. diabetic foot ulcers and neuropathy, receiving hyperbaric oxygen treatment w/ subsequent hypoglycemia episode, no hx DKA. managed by Endocrine Dr. Jasbir Garcia, Last seen 2 months ago, A1c 8.6%. pt uses minimed 640g CIIP, humalog 3 units/hr w/ ISS premeal+ bolus, carb ratio 1:7. uses dexcom CGM, reports numbers usually in range, 100-180, maintains BG above 130 for hyperbaric treatments, reports first instance of hypoglycemia, states his pump was suspended for treatment. sometimes as high as 250mg/dL depending on daily diet..  reinforced consistent carb and avoiding sweets, carb counting and portion control. matching insulin to carbs. s/s of hyperglycemia/hypoglycemia and management, pt keeps glucose tabs on person to treat hypoglycemia, discussed having glucagon kit. pt physically active but not exercising r/t foot wound.  diabetes education provided  Hx CKD s/p renal transplant, HLD, CVA,     inhospital: F/S 199>117>173>197>201  CC diet  accucheck ACHS  insulin pump humalog 3 units/hr  premeal humalog bolus 5-12 units      HPI:  The patient is a 55 y/o M with pmhx HTN, HLD, T1DM (on insulin pump), CKD (s/p renal transplant), CVA (with residual L sided sensation loss, R eye lid drooping), hx DVT (s/p Eliquis), September 11 related lung disease, CARMEN (on nocturnal CPAP), hx restrictive lung disease, squamous cell carcinoma OM L 4th digit s/p amputation, R 2nd digit toe amputation, R 5th digit amputation - who presents to the ED from wound care with an episode of loss of concioussness while recieving hyperbaric oxygen treatment. The patient states that he does not remember losing conciousness, only remembers waking up and being told to have sugar pills. He states this has never happened to him before. Of note, patient was supposed to fly to [facility]. Upon being told he has a R pleural effusion, patient states that he has had one pleural effusion before, which was drained and produced 600cc of fluid 2 months ago, his pulmonolgist is Dr. Boyce. He recalls being told that the workup of the fluid showed no abnormalities.     ED course  T 98.3F, HR 90, /98, RR 18, SpO2 98% r/a  Labs significant for trop 19 -> 20, Glu 209  EKG 1st deg AV block with PVC, ?RBBB  Imaging  -CXR: increasing R effusion  -CT head: no acute pathology     (12 Aug 2022 13:23)      PAST MEDICAL & SURGICAL HISTORY:  Hypertension      Hyperlipidemia      Diabetes mellitus  Type 1 on insulin pump      CKD (chronic kidney disease)  Renal Transplant 2013 at [facility]      Diabetic peripheral neuropathy      Obesity (BMI 30-39.9)      CVA (cerebral vascular accident)  [place] Stroke  low L body sensation  low R face sensation  decreased peripheral vision  poor balance      Squamous cell carcinoma of skin of left ear      History of DVT (deep vein thrombosis)  s/p eliquis      Recurrent squamous cell carcinoma of skin  nose, L arm      Toe infection  2007 L 4th Toe surgery-amputation secondary to osteomyelitis      Ear disease  Left inner ear surgery, 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, Can NOT have MRI      Vasectomy status  s/p b/l  vasectomy      H/O foot surgery  2005 - right foot - bone fracture - s/p ORIF and subsequent removal of hardware      End-stage renal failure with renal transplant  12/2013      S/P kidney transplant      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          REVIEW OF SYSTEMS  General:	as above  Respiratory: NAD, No SOB, no cough  Cardiovascular: No chest pain, no palpitations	  Endocrine: no polyuria, no polydipsia, or S/S of hypoglycemia  Neuro: denies numbess, no tingling	  Other:	see HPI      Allergies    No Known Allergies    Intolerances        MEDICATIONS  (STANDING):  aspirin enteric coated 325 milliGRAM(s) Oral daily  atorvastatin 10 milliGRAM(s) Oral at bedtime  azaTHIOprine 100 milliGRAM(s) Oral daily  cloNIDine 0.1 milliGRAM(s) Oral two times a day  dextrose 5%. 1000 milliLiter(s) (100 mL/Hr) IV Continuous <Continuous>  dextrose 5%. 1000 milliLiter(s) (50 mL/Hr) IV Continuous <Continuous>  dextrose 50% Injectable 25 Gram(s) IV Push once  dextrose 50% Injectable 12.5 Gram(s) IV Push once  dextrose 50% Injectable 25 Gram(s) IV Push once  gabapentin 300 milliGRAM(s) Oral two times a day  glucagon  Injectable 1 milliGRAM(s) IntraMuscular once  hydrALAZINE 75 milliGRAM(s) Oral two times a day  insulin lispro (HumaLOG) Pump 1 Each SubCutaneous Continuous Pump  losartan 50 milliGRAM(s) Oral daily  metoprolol tartrate 25 milliGRAM(s) Oral three times a day  tacrolimus 1.5 milliGRAM(s) Oral every 12 hours  trimethoprim   80 mG/sulfamethoxazole 400 mG 1 Tablet(s) Oral daily

## 2022-08-15 NOTE — PROGRESS NOTE ADULT - ASSESSMENT
55 y/o M with pmhx HTN, HLD, T1DM (on insulin pump), CKD (s/p renal transplant), CVA, hx DVT (s/p Eliquis), CARMEN (on nocturnal CPAP), hx restrictive lung disease, squamous cell carcinoma OM, multiple toe amputations, presents to the ED from wound care with an episode of loss of consciousness while receiving hyperbaric oxygen treatment. Found to have R pleural effusion.     Syncope, Pleural effusion/HTN  - Head CT shows no acute events. Neuro following   - Tele with SB/SR 50's-60's.  Can D/C tele  - TTE 07/2022 EF 65% with min MR/TR  - CT chest noted, s/p thora today with ~1L removal  - Patient had prev thoracentesis for pleural effusion 2 months ago, pulm following     - Pharm nuc stress test showed normal myocardial perfusion   - ECG shows NSR with PVCs.  No anginal symptoms.  No significant arrhythmias on tele  - No evidence of any active ischemia   - Continue aspirin and statin     - BP labile at systolic 130-170  - Continue home clonidine, BB, hydralazine (increased on 8/13).  Continue Losartan to 50mg for better BP control.    - Monitor and replete lytes, keep K>4, Mg>2.  - Will continue to follow.    Miracle Fowler DNP, NP-C  Cardiology   Spectra #[phone]

## 2022-08-15 NOTE — PROGRESS NOTE ADULT - PROBLEM SELECTOR PLAN 3
Still elevated BP this afternoon  -CT head negative for acute intracranial pathology  -Continue home  clonidine 0.1mg po bid,  Increased losartan to 50 mg, metoprolol tartrate 25mg po tid, hydralazine 75 mg po bid with hold parameters
Still elevated BP this afternoon  -CT head negative for acute intracranial pathology  -Continue home clonidine 0.1mg po bid,  Increased losartan to 50 mg yesterday, metoprolol tartrate 25mg po tid, hydralazine 75 mg po bid with hold parameters
Still elevated BP this afternoon, given clonidine   -CT head negative for acute intracranial pathology  -Continue home  clonidine 0.1mg po bid, metoprolol tartrate 25mg po tid, hydralazine 75 mg po bid with hold parameters

## 2022-08-15 NOTE — PROGRESS NOTE ADULT - ASSESSMENT
55 y/o M with pmhx HTN, HLD, T1DM (on insulin pump), CKD (s/p renal transplant), CVA (with residual L sided sensation loss, R eye lid drooping), hx DVT (s/p Eliquis), September 11 related lung disease, CARMEN (on nocturnal CPAP), hx restrictive lung disease, squamous cell carcinoma OM L 4th digit s/p amputation, R 2nd digit toe amputation, R 5th digit amputation - who presents to the ED from wound care with an episode of loss of concioussness while recieving hyperbaric oxygen     thoracentesis this am   vs noted  labs reviewed  enc use of NIPPV for CARMEN    nocturnal CPAP  CARMEN management  sleep hygiene  DM care  cvs rx regimen and BP control  serial labs - renal indices   hx of renal transplant  I wayne  monitor VS and Sat  will need Pleurocentesis - Monday - with IR - Loculated right effusion   w/u in progress  Neuro follow up

## 2022-08-16 ENCOUNTER — APPOINTMENT (OUTPATIENT)
Dept: HYPERBARIC MEDICINE | Facility: HOSPITAL | Age: 57
End: 2022-08-16

## 2022-08-16 ENCOUNTER — TRANSCRIPTION ENCOUNTER (OUTPATIENT)
Age: 57
End: 2022-08-16

## 2022-08-16 VITALS — TEMPERATURE: 98 F | OXYGEN SATURATION: 87 % | HEART RATE: 90 BPM | RESPIRATION RATE: 17 BRPM

## 2022-08-16 LAB
ANION GAP SERPL CALC-SCNC: 2 MMOL/L — LOW (ref 5–17)
BASOPHILS # BLD AUTO: 0.07 K/UL — SIGNIFICANT CHANGE UP (ref 0–0.2)
BASOPHILS NFR BLD AUTO: 0.9 % — SIGNIFICANT CHANGE UP (ref 0–2)
BUN SERPL-MCNC: 20 MG/DL — SIGNIFICANT CHANGE UP (ref 7–23)
CALCIUM SERPL-MCNC: 9.2 MG/DL — SIGNIFICANT CHANGE UP (ref 8.5–10.1)
CHLORIDE SERPL-SCNC: 100 MMOL/L — SIGNIFICANT CHANGE UP (ref 96–108)
CO2 SERPL-SCNC: 36 MMOL/L — HIGH (ref 22–31)
CREAT SERPL-MCNC: 1.1 MG/DL — SIGNIFICANT CHANGE UP (ref 0.5–1.3)
EGFR: 79 ML/MIN/1.73M2 — SIGNIFICANT CHANGE UP
EOSINOPHIL # BLD AUTO: 0.23 K/UL — SIGNIFICANT CHANGE UP (ref 0–0.5)
EOSINOPHIL NFR BLD AUTO: 2.8 % — SIGNIFICANT CHANGE UP (ref 0–6)
GLUCOSE SERPL-MCNC: 171 MG/DL — HIGH (ref 70–99)
HCT VFR BLD CALC: 41.4 % — SIGNIFICANT CHANGE UP (ref 39–50)
HGB BLD-MCNC: 13.5 G/DL — SIGNIFICANT CHANGE UP (ref 13–17)
IMM GRANULOCYTES NFR BLD AUTO: 0.2 % — SIGNIFICANT CHANGE UP (ref 0–1.5)
LYMPHOCYTES # BLD AUTO: 1 K/UL — SIGNIFICANT CHANGE UP (ref 1–3.3)
LYMPHOCYTES # BLD AUTO: 12.2 % — LOW (ref 13–44)
MCHC RBC-ENTMCNC: 28.8 PG — SIGNIFICANT CHANGE UP (ref 27–34)
MCHC RBC-ENTMCNC: 32.6 GM/DL — SIGNIFICANT CHANGE UP (ref 32–36)
MCV RBC AUTO: 88.3 FL — SIGNIFICANT CHANGE UP (ref 80–100)
MONOCYTES # BLD AUTO: 0.6 K/UL — SIGNIFICANT CHANGE UP (ref 0–0.9)
MONOCYTES NFR BLD AUTO: 7.3 % — SIGNIFICANT CHANGE UP (ref 2–14)
NEUTROPHILS # BLD AUTO: 6.29 K/UL — SIGNIFICANT CHANGE UP (ref 1.8–7.4)
NEUTROPHILS NFR BLD AUTO: 76.6 % — SIGNIFICANT CHANGE UP (ref 43–77)
NRBC # BLD: 0 /100 WBCS — SIGNIFICANT CHANGE UP (ref 0–0)
PLATELET # BLD AUTO: 212 K/UL — SIGNIFICANT CHANGE UP (ref 150–400)
POTASSIUM SERPL-MCNC: 4.4 MMOL/L — SIGNIFICANT CHANGE UP (ref 3.5–5.3)
POTASSIUM SERPL-SCNC: 4.4 MMOL/L — SIGNIFICANT CHANGE UP (ref 3.5–5.3)
RBC # BLD: 4.69 M/UL — SIGNIFICANT CHANGE UP (ref 4.2–5.8)
RBC # FLD: 13.2 % — SIGNIFICANT CHANGE UP (ref 10.3–14.5)
SODIUM SERPL-SCNC: 138 MMOL/L — SIGNIFICANT CHANGE UP (ref 135–145)
WBC # BLD: 8.21 K/UL — SIGNIFICANT CHANGE UP (ref 3.8–10.5)
WBC # FLD AUTO: 8.21 K/UL — SIGNIFICANT CHANGE UP (ref 3.8–10.5)

## 2022-08-16 PROCEDURE — 83036 HEMOGLOBIN GLYCOSYLATED A1C: CPT

## 2022-08-16 PROCEDURE — 85610 PROTHROMBIN TIME: CPT

## 2022-08-16 PROCEDURE — 99233 SBSQ HOSP IP/OBS HIGH 50: CPT

## 2022-08-16 PROCEDURE — 82042 OTHER SOURCE ALBUMIN QUAN EA: CPT

## 2022-08-16 PROCEDURE — U0005: CPT

## 2022-08-16 PROCEDURE — U0003: CPT

## 2022-08-16 PROCEDURE — 89051 BODY FLUID CELL COUNT: CPT

## 2022-08-16 PROCEDURE — 80061 LIPID PANEL: CPT

## 2022-08-16 PROCEDURE — 85730 THROMBOPLASTIN TIME PARTIAL: CPT

## 2022-08-16 PROCEDURE — 83615 LACTATE (LD) (LDH) ENZYME: CPT

## 2022-08-16 PROCEDURE — 85025 COMPLETE CBC W/AUTO DIFF WBC: CPT

## 2022-08-16 PROCEDURE — 32555 ASPIRATE PLEURA W/ IMAGING: CPT

## 2022-08-16 PROCEDURE — 94760 N-INVAS EAR/PLS OXIMETRY 1: CPT

## 2022-08-16 PROCEDURE — 87070 CULTURE OTHR SPECIMN AEROBIC: CPT

## 2022-08-16 PROCEDURE — C1729: CPT

## 2022-08-16 PROCEDURE — 84484 ASSAY OF TROPONIN QUANT: CPT

## 2022-08-16 PROCEDURE — 80197 ASSAY OF TACROLIMUS: CPT

## 2022-08-16 PROCEDURE — 71045 X-RAY EXAM CHEST 1 VIEW: CPT

## 2022-08-16 PROCEDURE — 80048 BASIC METABOLIC PNL TOTAL CA: CPT

## 2022-08-16 PROCEDURE — 85018 HEMOGLOBIN: CPT

## 2022-08-16 PROCEDURE — 83986 ASSAY PH BODY FLUID NOS: CPT

## 2022-08-16 PROCEDURE — 85014 HEMATOCRIT: CPT

## 2022-08-16 PROCEDURE — 99232 SBSQ HOSP IP/OBS MODERATE 35: CPT

## 2022-08-16 PROCEDURE — 88108 CYTOPATH CONCENTRATE TECH: CPT

## 2022-08-16 PROCEDURE — 88305 TISSUE EXAM BY PATHOLOGIST: CPT

## 2022-08-16 PROCEDURE — G0463: CPT

## 2022-08-16 PROCEDURE — 82962 GLUCOSE BLOOD TEST: CPT

## 2022-08-16 PROCEDURE — 87102 FUNGUS ISOLATION CULTURE: CPT

## 2022-08-16 PROCEDURE — 99285 EMERGENCY DEPT VISIT HI MDM: CPT | Mod: 25

## 2022-08-16 PROCEDURE — G0277: CPT

## 2022-08-16 PROCEDURE — 82945 GLUCOSE OTHER FLUID: CPT

## 2022-08-16 PROCEDURE — 36415 COLL VENOUS BLD VENIPUNCTURE: CPT

## 2022-08-16 PROCEDURE — 93005 ELECTROCARDIOGRAM TRACING: CPT

## 2022-08-16 PROCEDURE — 80053 COMPREHEN METABOLIC PANEL: CPT

## 2022-08-16 PROCEDURE — 85027 COMPLETE CBC AUTOMATED: CPT

## 2022-08-16 PROCEDURE — 87205 SMEAR GRAM STAIN: CPT

## 2022-08-16 PROCEDURE — 70450 CT HEAD/BRAIN W/O DYE: CPT | Mod: MA

## 2022-08-16 PROCEDURE — 87075 CULTR BACTERIA EXCEPT BLOOD: CPT

## 2022-08-16 PROCEDURE — 84157 ASSAY OF PROTEIN OTHER: CPT

## 2022-08-16 PROCEDURE — 71250 CT THORAX DX C-: CPT

## 2022-08-16 RX ORDER — LOSARTAN POTASSIUM 100 MG/1
1 TABLET, FILM COATED ORAL
Qty: 0 | Refills: 0 | DISCHARGE
Start: 2022-08-16

## 2022-08-16 RX ORDER — HYDRALAZINE HCL 50 MG
3 TABLET ORAL
Qty: 180 | Refills: 0
Start: 2022-08-16 | End: 2022-09-14

## 2022-08-16 RX ORDER — HYDRALAZINE HCL 50 MG
1.5 TABLET ORAL
Qty: 0 | Refills: 0 | DISCHARGE

## 2022-08-16 RX ORDER — HYDRALAZINE HCL 50 MG
3 TABLET ORAL
Qty: 0 | Refills: 0 | DISCHARGE
Start: 2022-08-16

## 2022-08-16 RX ORDER — LOSARTAN POTASSIUM 100 MG/1
1 TABLET, FILM COATED ORAL
Qty: 30 | Refills: 0
Start: 2022-08-16 | End: 2022-09-14

## 2022-08-16 RX ORDER — LOSARTAN POTASSIUM 100 MG/1
1 TABLET, FILM COATED ORAL
Qty: 0 | Refills: 0 | DISCHARGE

## 2022-08-16 RX ADMIN — Medication 325 MILLIGRAM(S): at 11:27

## 2022-08-16 RX ADMIN — Medication 75 MILLIGRAM(S): at 05:12

## 2022-08-16 RX ADMIN — Medication 25 MILLIGRAM(S): at 05:12

## 2022-08-16 RX ADMIN — HEPARIN SODIUM 5000 UNIT(S): 5000 INJECTION INTRAVENOUS; SUBCUTANEOUS at 05:12

## 2022-08-16 RX ADMIN — GABAPENTIN 300 MILLIGRAM(S): 400 CAPSULE ORAL at 05:11

## 2022-08-16 RX ADMIN — AZATHIOPRINE 100 MILLIGRAM(S): 100 TABLET ORAL at 11:27

## 2022-08-16 RX ADMIN — Medication 0.1 MILLIGRAM(S): at 05:12

## 2022-08-16 RX ADMIN — Medication 1 TABLET(S): at 11:28

## 2022-08-16 RX ADMIN — TACROLIMUS 1.5 MILLIGRAM(S): 5 CAPSULE ORAL at 10:37

## 2022-08-16 RX ADMIN — LOSARTAN POTASSIUM 50 MILLIGRAM(S): 100 TABLET, FILM COATED ORAL at 05:13

## 2022-08-16 NOTE — PROGRESS NOTE ADULT - PROVIDER SPECIALTY LIST ADULT
Nephrology
Cardiology
Nephrology
Neurology
Podiatry
Pulmonology
Pulmonology
Cardiology
Cardiology
Neurology
Pulmonology
Pulmonology
Cardiology
Endocrinology
Hospitalist

## 2022-08-16 NOTE — PROGRESS NOTE ADULT - SUBJECTIVE AND OBJECTIVE BOX
Patient is a 56y old  Male who presents with a chief complaint of LOC, R pleural effusion (15 Aug 2022 09:10)    Patient seen in follow up for CKD.        PAST MEDICAL HISTORY:  Hypertension    Hyperlipidemia    Diabetes mellitus    CKD (chronic kidney disease)    Diabetic peripheral neuropathy    Obesity (BMI 30-39.9)    CVA (cerebral vascular accident)    Squamous cell carcinoma of skin of left ear    History of DVT (deep vein thrombosis)    Recurrent squamous cell carcinoma of skin      MEDICATIONS  (STANDING):  aspirin enteric coated 325 milliGRAM(s) Oral daily  atorvastatin 10 milliGRAM(s) Oral at bedtime  azaTHIOprine 100 milliGRAM(s) Oral daily  cloNIDine 0.1 milliGRAM(s) Oral two times a day  dextrose 5%. 1000 milliLiter(s) (50 mL/Hr) IV Continuous <Continuous>  dextrose 5%. 1000 milliLiter(s) (100 mL/Hr) IV Continuous <Continuous>  dextrose 50% Injectable 25 Gram(s) IV Push once  dextrose 50% Injectable 12.5 Gram(s) IV Push once  dextrose 50% Injectable 25 Gram(s) IV Push once  gabapentin 300 milliGRAM(s) Oral two times a day  glucagon  Injectable 1 milliGRAM(s) IntraMuscular once  heparin   Injectable 5000 Unit(s) SubCutaneous every 8 hours  hydrALAZINE 75 milliGRAM(s) Oral two times a day  insulin lispro (HumaLOG) Pump 1 Each SubCutaneous Continuous Pump  losartan 50 milliGRAM(s) Oral daily  metoprolol tartrate 25 milliGRAM(s) Oral three times a day  tacrolimus 1.5 milliGRAM(s) Oral every 12 hours  trimethoprim   80 mG/sulfamethoxazole 400 mG 1 Tablet(s) Oral daily    MEDICATIONS  (PRN):  dextrose Oral Gel 15 Gram(s) Oral once PRN Blood Glucose LESS THAN 70 milliGRAM(s)/deciliter  melatonin 3 milliGRAM(s) Oral at bedtime PRN Insomnia    T(C): 37.2 (08-16-22 @ 05:02), Max: 37.4 (08-15-22 @ 20:16)  HR: 85 (08-16-22 @ 05:02) (70 - 85)  BP: 155/80 (08-16-22 @ 05:02) (125/69 - 174/74)  RR: 17 (08-16-22 @ 05:02)  SpO2: 92% (08-16-22 @ 09:21)  Wt(kg): --  I&O's Detail    15 Aug 2022 07:01  -  16 Aug 2022 07:00  --------------------------------------------------------  IN:  Total IN: 0 mL    OUT:    VAC (Vacuum Assisted Closure) System (mL): 1000 mL  Total OUT: 1000 mL    Total NET: -1000 mL                PHYSICAL EXAM:  General: No distress  Respiratory: b/l air entry  Cardiovascular: S1 S2  Gastrointestinal: soft  Extremities:  no edema                               LABORATORY:                        13.5   8.21  )-----------( 212      ( 16 Aug 2022 05:52 )             41.4     08-16    138  |  100  |  20  ----------------------------<  171<H>  4.4   |  36<H>  |  1.10    Ca    9.2      16 Aug 2022 05:52      Sodium, Serum: 138 mmol/L (08-16 @ 05:52)  Sodium, Serum: 140 mmol/L (08-15 @ 05:50)    Potassium, Serum: 4.4 mmol/L (08-16 @ 05:52)  Potassium, Serum: 4.3 mmol/L (08-15 @ 05:50)    Hemoglobin: 13.5 g/dL (08-16 @ 05:52)  Hemoglobin: 14.3 g/dL (08-15 @ 15:02)  Hemoglobin: 13.5 g/dL (08-15 @ 05:50)  Hemoglobin: 14.5 g/dL (08-14 @ 06:29)    Creatinine, Serum 1.10 (08-16 @ 05:52)  Creatinine, Serum 1.20 (08-15 @ 05:50)  Creatinine, Serum 1.00 (08-14 @ 06:29)

## 2022-08-16 NOTE — CONSULT NOTE ADULT - ASSESSMENT
Physical Exam:   Vital Signs Last 24 Hrs  T(C): 36.8 (16 Aug 2022 12:59), Max: 37.4 (15 Aug 2022 20:16)  T(F): 98.3 (16 Aug 2022 12:59), Max: 99.3 (15 Aug 2022 20:16)  HR: 80 (16 Aug 2022 12:59) (80 - 85)  BP: 137/75 (16 Aug 2022 12:59) (125/69 - 170/68)  BP(mean): --  RR: 19 (16 Aug 2022 12:59) (17 - 20)  SpO2: 91% (16 Aug 2022 12:59) (91% - 97%)    Parameters below as of 16 Aug 2022 12:59  Patient On (Oxygen Delivery Method): room air                 CAPILLARY BLOOD GLUCOSE      POCT Blood Glucose.: 164 mg/dL (16 Aug 2022 12:30)  POCT Blood Glucose.: 160 mg/dL (16 Aug 2022 08:02)  POCT Blood Glucose.: 169 mg/dL (15 Aug 2022 21:20)  POCT Blood Glucose.: 185 mg/dL (15 Aug 2022 16:30)      Cholesterol, Serum: 113 mg/dL (05.19.21 @ 08:36)     HDL Cholesterol, Serum: 22 mg/dL (05.19.21 @ 08:36)     LDL Cholesterol Calculated: 66 mg/dL (05.19.21 @ 08:36)     DIET: CC  >50%

## 2022-08-16 NOTE — PROGRESS NOTE ADULT - ATTENDING COMMENTS
Agree with above findings and plan
57 y/o M with pmhx HTN, HLD, T1DM (on insulin pump), CKD (s/p renal transplant), CVA, hx DVT (s/p Eliquis), CARMEN (on nocturnal CPAP), hx restrictive lung disease, squamous cell carcinoma, multiple toe amputations, presents to the ED from wound care with an episode of loss of concousness while receiving hyperbaric oxygen treatment. Found to have R pleural effusion.   s/p IR guided thoracentesis, 1 L drained.  Restart heparin in AM  attempt to wean off O2, titrate as needed    Wife was in room, addressed all questions and concerns

## 2022-08-16 NOTE — PROGRESS NOTE ADULT - ASSESSMENT
CKD 3, h/o Kidney transplant 2013 [facility]  Diabetes, Diabetic foot ulcer  Syncope  Hypertension  large right pleural effusion  Metabolic alkalosis, Likely secondary to chronic hypercapnea.    Stable renal function. To continue current meds. Monitor blood sugar levels. Insulin coverage as needed.   Monitor BP trend. Titrate BP meds as needed. Salt restriction. Will follow electrolytes and renal function trend.

## 2022-08-16 NOTE — DISCHARGE NOTE NURSING/CASE MANAGEMENT/SOCIAL WORK - PATIENT PORTAL LINK FT
You can access the FollowMyHealth Patient Portal offered by [facility] by registering at the following website: [email or web address]. By joining [facility]’s FollowMyHealth portal, you will also be able to view your health information using other applications (apps) compatible with our system.

## 2022-08-16 NOTE — PROGRESS NOTE ADULT - ASSESSMENT
55 y/o M with pmhx HTN, HLD, T1DM (on insulin pump), CKD (s/p renal transplant), CVA, hx DVT (s/p Eliquis), CARMEN (on nocturnal CPAP), hx restrictive lung disease, squamous cell carcinoma OM, multiple toe amputations, presents to the ED from wound care with an episode of loss of consciousness while receiving hyperbaric oxygen treatment. Found to have R pleural effusion.     Syncope, Pleural effusion/HTN  - Head CT shows no acute events. Neuro following   - Tele with SR, no events for >48 hrs. D/C tele  - TTE 07/2022 EF 65% with min MR/TR  - CT chest noted, s/p thora 8/15 with ~1L removal, tolerated well from CV standpoint  - Patient had prev thoracentesis for pleural effusion 2 months ago, pulm following     - Pharm nuc stress test showed normal myocardial perfusion   - ECG shows NSR with PVCs.  No anginal symptoms.  No significant arrhythmias on tele  - No evidence of any active ischemia   - Continue aspirin and statin     - BP better controlled, continue home clonidine, BB, hydralazine (increased on 8/13), Losartan (increased 8/14).   - Monitor and replete lytes, keep K>4, Mg>2.  - Will continue to follow.    Nell Schilling NP  Nurse Practitioner- Cardiology   Spectra #[phone]  57 y/o M with pmhx HTN, HLD, T1DM (on insulin pump), CKD (s/p renal transplant), CVA, hx DVT (s/p Eliquis), CARMEN (on nocturnal CPAP), hx restrictive lung disease, squamous cell carcinoma OM, multiple toe amputations, presents to the ED from wound care with an episode of loss of consciousness while receiving hyperbaric oxygen treatment. Found to have R pleural effusion.     Syncope, Pleural effusion/HTN  - Head CT shows no acute events. Neuro following   - Tele with SR, no events for >48 hrs. D/C tele  - TTE 07/2022 EF 65% with min MR/TR  - CT chest noted, s/p thora 8/15 with ~1L removal, tolerated well from CV standpoint  - Patient had prev thoracentesis for pleural effusion 2 months ago, pulm following     - Pharm nuc stress test showed normal myocardial perfusion   - ECG shows NSR with PVCs.  No anginal symptoms. No evidence of any active ischemia   - Continue aspirin and statin     - BP better controlled, continue home clonidine, BB, hydralazine (increased on 8/13), Losartan (increased 8/14).   - Monitor and replete lytes, keep K>4, Mg>2.  - Will continue to follow.    Nell Schilling NP  Nurse Practitioner- Cardiology   Spectra #[phone]

## 2022-08-16 NOTE — CONSULT NOTE ADULT - CONSULT REASON
56y A1C with Estimated Average Glucose Result: 8.6 % (08-13-22 @ 06:01)   diabetes mellitus uncontrolled type 1
56y A1C with Estimated Average Glucose Result: 8.6 % (08-13-22 @ 06:01)   diabetes mellitus uncontrolled type 1
CARMEN  right effusion
dm2 uncontrolled
h/o Kidney transplant
right foot wound
possible syncope

## 2022-08-16 NOTE — CONSULT NOTE ADULT - REASON FOR ADMISSION
LOC, R pleural effusion

## 2022-08-16 NOTE — CONSULT NOTE ADULT - CONSULT REQUESTED DATE/TIME
13-Aug-2022 10:17
12-Aug-2022 17:37
12-Aug-2022
13-Aug-2022 11:46
15-Aug-2022 14:51
13-Aug-2022 13:02
16-Aug-2022 13:26

## 2022-08-16 NOTE — PROGRESS NOTE ADULT - SUBJECTIVE AND OBJECTIVE BOX
Date/Time Patient Seen:  		  Referring MD:   Data Reviewed	       Patient is a 56y old  Male who presents with a chief complaint of LOC, R pleural effusion (15 Aug 2022 16:08)      Subjective/HPI     PAST MEDICAL & SURGICAL HISTORY:  Hypertension    Hyperlipidemia    Diabetes mellitus  Type 1 on insulin pump    CKD (chronic kidney disease)  Renal Transplant 2013 at [facility]    Diabetic peripheral neuropathy    Obesity (BMI 30-39.9)    CVA (cerebral vascular accident)  Wallenberg Stroke  low L body sensation  low R face sensation  decreased peripheral vision  poor balance    Squamous cell carcinoma of skin of left ear    History of DVT (deep vein thrombosis)  s/p eliquis    Recurrent squamous cell carcinoma of skin  nose, L arm    Toe infection  2007 L 4th Toe surgery-amputation secondary to osteomyelitis    Ear disease  Left inner ear surgery, 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 1000 milliLiter(s) (50 mL/Hr) IV Continuous <Continuous>  dextrose 5%. 1000 milliLiter(s) (100 mL/Hr) IV Continuous <Continuous>  dextrose 50% Injectable 25 Gram(s) IV Push once  dextrose 50% Injectable 12.5 Gram(s) IV Push once  dextrose 50% Injectable 25 Gram(s) IV Push once  gabapentin 300 milliGRAM(s) Oral two times a day  glucagon  Injectable 1 milliGRAM(s) IntraMuscular once  heparin   Injectable 5000 Unit(s) SubCutaneous every 8 hours  hydrALAZINE 75 milliGRAM(s) Oral two times a day  insulin lispro (HumaLOG) Pump 1 Each SubCutaneous Continuous Pump  losartan 50 milliGRAM(s) Oral daily  metoprolol tartrate 25 milliGRAM(s) Oral three times a day  tacrolimus 1.5 milliGRAM(s) Oral every 12 hours  trimethoprim   80 mG/sulfamethoxazole 400 mG 1 Tablet(s) Oral daily    MEDICATIONS  (PRN):  dextrose Oral Gel 15 Gram(s) Oral once PRN Blood Glucose LESS THAN 70 milliGRAM(s)/deciliter  melatonin 3 milliGRAM(s) Oral at bedtime PRN Insomnia         Vitals log        ICU Vital Signs Last 24 Hrs  T(C): 37.2 (16 Aug 2022 05:02), Max: 37.4 (15 Aug 2022 20:16)  T(F): 99 (16 Aug 2022 05:02), Max: 99.3 (15 Aug 2022 20:16)  HR: 85 (16 Aug 2022 05:02) (75 - 85)  BP: 155/80 (16 Aug 2022 05:02) (125/69 - 170/68)  BP(mean): --  ABP: --  ABP(mean): --  RR: 17 (16 Aug 2022 05:02) (16 - 20)  SpO2: 95% (16 Aug 2022 05:02) (94% - 97%)    O2 Parameters below as of 16 Aug 2022 05:02  Patient On (Oxygen Delivery Method): nasal cannula  O2 Flow (L/min): 2               Input and Output:  I&O's Detail    15 Aug 2022 07:01  -  16 Aug 2022 06:39  --------------------------------------------------------  IN:  Total IN: 0 mL    OUT:    VAC (Vacuum Assisted Closure) System (mL): 1000 mL  Total OUT: 1000 mL    Total NET: -1000 mL          Lab Data                        14.3   x     )-----------( x        ( 15 Aug 2022 15:02 )             44.7     08-15    140  |  101  |  20  ----------------------------<  135<H>  4.3   |  37<H>  |  1.20    Ca    9.6      15 Aug 2022 05:50              Review of Systems	      Objective     Physical Examination  heart s1s2  lung dc BS  abd soft        Pertinent Lab findings & Imaging      Eli:  NO   Adequate UO     I&O's Detail    15 Aug 2022 07:01  -  16 Aug 2022 06:39  --------------------------------------------------------  IN:  Total IN: 0 mL    OUT:    VAC (Vacuum Assisted Closure) System (mL): 1000 mL  Total OUT: 1000 mL    Total NET: -1000 mL               Discussed with:     Cultures:	        Radiology

## 2022-08-16 NOTE — PROGRESS NOTE ADULT - SUBJECTIVE AND OBJECTIVE BOX
56y year old Male seen at \[facility]\"" [facility] [address] for plantar wound , right foot.  Patient was laying comfortably in bed in No acute distress. Denies any fever, chills, nausea, vomiting, chest pain, shortness of breath, or calf pain at this time.    REVIEW OF SYSTEMS    PAST MEDICAL & SURGICAL HISTORY:  Hypertension      Hyperlipidemia      Diabetes mellitus  Type 1 on insulin pump      CKD (chronic kidney disease)  Renal Transplant 2013 at [facility]      Diabetic peripheral neuropathy      Obesity (BMI 30-39.9)      CVA (cerebral vascular accident)  Wallenberg Stroke  low L body sensation  low R face sensation  decreased peripheral vision  poor balance      Squamous cell carcinoma of skin of left ear      History of DVT (deep vein thrombosis)  s/p eliquis      Recurrent squamous cell carcinoma of skin  nose, L arm      Toe infection  2007 L 4th Toe surgery-amputation secondary to osteomyelitis      Ear disease  Left inner ear surgery, 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, Can NOT have MRI      Vasectomy status  s/p b/l  vasectomy      H/O foot surgery  2005 - right foot - bone fracture - s/p ORIF and subsequent removal of hardware      End-stage renal failure with renal transplant  12/2013      S/P kidney transplant      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          Allergies    No Known Allergies    Intolerances        MEDICATIONS  (STANDING):  aspirin enteric coated 325 milliGRAM(s) Oral daily  atorvastatin 10 milliGRAM(s) Oral at bedtime  azaTHIOprine 100 milliGRAM(s) Oral daily  cloNIDine 0.1 milliGRAM(s) Oral two times a day  dextrose 5%. 1000 milliLiter(s) (50 mL/Hr) IV Continuous <Continuous>  dextrose 5%. 1000 milliLiter(s) (100 mL/Hr) IV Continuous <Continuous>  dextrose 50% Injectable 25 Gram(s) IV Push once  dextrose 50% Injectable 12.5 Gram(s) IV Push once  dextrose 50% Injectable 25 Gram(s) IV Push once  gabapentin 300 milliGRAM(s) Oral two times a day  glucagon  Injectable 1 milliGRAM(s) IntraMuscular once  heparin   Injectable 5000 Unit(s) SubCutaneous every 8 hours  hydrALAZINE 75 milliGRAM(s) Oral two times a day  insulin lispro (HumaLOG) Pump 1 Each SubCutaneous Continuous Pump  losartan 50 milliGRAM(s) Oral daily  metoprolol tartrate 25 milliGRAM(s) Oral three times a day  tacrolimus 1.5 milliGRAM(s) Oral every 12 hours  trimethoprim   80 mG/sulfamethoxazole 400 mG 1 Tablet(s) Oral daily    MEDICATIONS  (PRN):  dextrose Oral Gel 15 Gram(s) Oral once PRN Blood Glucose LESS THAN 70 milliGRAM(s)/deciliter  melatonin 3 milliGRAM(s) Oral at bedtime PRN Insomnia      Social History:  Patient does not smoke, drink, or use illicit substances. He lives at home with his wife and kids. He ambulates independently and is sexually active. (12 Aug 2022 13:23)      FAMILY HISTORY:  Family history of diabetes mellitus (DM)    FH: skin cancer        Vital Signs Last 24 Hrs  T(C): 36.8 (16 Aug 2022 12:59), Max: 37.4 (15 Aug 2022 20:16)  T(F): 98.3 (16 Aug 2022 12:59), Max: 99.3 (15 Aug 2022 20:16)  HR: 80 (16 Aug 2022 12:59) (80 - 85)  BP: 137/75 (16 Aug 2022 12:59) (125/69 - 155/80)  BP(mean): --  RR: 19 (16 Aug 2022 12:59) (17 - 20)  SpO2: 91% (16 Aug 2022 12:59) (91% - 95%)    Parameters below as of 16 Aug 2022 12:59  Patient On (Oxygen Delivery Method): room air      PHYSICAL EXAM:  Vascular: DP & PT palpable bilaterally, Capillary refill 3 seconds, Digital hair present bilaterally  Neurological: Light touch sensation intact bilaterally  Musculoskeletal: 5/5 strength in all quadrants bilaterally, AJ & STJ ROM intact  Dermatological: 1.6 x1.2 x 0.1 cm ulceration noted to the right plantar foot and annular 0.5 cm wound noted to the lateral aspect of right foot at the level of 5th met head both wounds has granular wound bed, no probe to bone, no periwound erythema, no fluctuance, no malodor, no proximal streaking at this time      CBC Full  -  ( 16 Aug 2022 05:52 )  WBC Count : 8.21 K/uL  RBC Count : 4.69 M/uL  Hemoglobin : 13.5 g/dL  Hematocrit : 41.4 %  Platelet Count - Automated : 212 K/uL  Mean Cell Volume : 88.3 fl  Mean Cell Hemoglobin : 28.8 pg  Mean Cell Hemoglobin Concentration : 32.6 gm/dL  Auto Neutrophil # : 6.29 K/uL  Auto Lymphocyte # : 1.00 K/uL  Auto Monocyte # : 0.60 K/uL  Auto Eosinophil # : 0.23 K/uL  Auto Basophil # : 0.07 K/uL  Auto Neutrophil % : 76.6 %  Auto Lymphocyte % : 12.2 %  Auto Monocyte % : 7.3 %  Auto Eosinophil % : 2.8 %  Auto Basophil % : 0.9 %      08-16    138  |  100  |  20  ----------------------------<  171<H>  4.4   |  36<H>  |  1.10    Ca    9.2      16 Aug 2022 05:52              Culture - Fungal, Body Fluid (collected 15 Aug 2022 10:11)  Source: Pleural Fl Pleural Fluid  Preliminary Report (16 Aug 2022 09:38):    Testing in progress    Culture - Body Fluid with Gram Stain (collected 15 Aug 2022 10:11)  Source: Pleural Fl Pleural Fluid  Gram Stain (15 Aug 2022 23:08):    polymorphonuclear leukocytes seen    No organisms seen    by cytocentrifuge  Preliminary Report (16 Aug 2022 11:04):    No growth

## 2022-08-16 NOTE — PROGRESS NOTE ADULT - SUBJECTIVE AND OBJECTIVE BOX
[facility] -- Julisa Jansen, Luly Larson, Karan Kelly Savella, Goodger  Office # [phone]    Follow Up:  syncope, Pleural Effusion    Subjective/Observations: Sitting on the chair s/p right thoracentesis. Reports mild pain at right thoracentesis site.  Denies SOB, WOOTEN or orthopnea.  Denies CP or palpitations Remains on supplemental 02 via NC at 2L.       REVIEW OF SYSTEMS: All other review of systems is negative unless indicated above  PAST MEDICAL & SURGICAL HISTORY:  Hypertension      Hyperlipidemia      Diabetes mellitus  Type 1 on insulin pump      CKD (chronic kidney disease)  Renal Transplant 2013 at [facility]      Diabetic peripheral neuropathy      Obesity (BMI 30-39.9)      CVA (cerebral vascular accident)  Wallenberg Stroke  low L body sensation  low R face sensation  decreased peripheral vision  poor balance      Squamous cell carcinoma of skin of left ear      History of DVT (deep vein thrombosis)  s/p eliquis      Recurrent squamous cell carcinoma of skin  nose, L arm      Toe infection  2007 L 4th Toe surgery-amputation secondary to osteomyelitis      Ear disease  Left inner ear surgery, 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 1000 milliLiter(s) (100 mL/Hr) IV Continuous <Continuous>  dextrose 5%. 1000 milliLiter(s) (50 mL/Hr) IV Continuous <Continuous>  dextrose 50% Injectable 25 Gram(s) IV Push once  dextrose 50% Injectable 12.5 Gram(s) IV Push once  dextrose 50% Injectable 25 Gram(s) IV Push once  gabapentin 300 milliGRAM(s) Oral two times a day  glucagon  Injectable 1 milliGRAM(s) IntraMuscular once  heparin   Injectable 5000 Unit(s) SubCutaneous every 8 hours  hydrALAZINE 75 milliGRAM(s) Oral two times a day  insulin lispro (HumaLOG) Pump 1 Each SubCutaneous Continuous Pump  losartan 50 milliGRAM(s) Oral daily  metoprolol tartrate 25 milliGRAM(s) Oral three times a day  tacrolimus 1.5 milliGRAM(s) Oral every 12 hours  trimethoprim   80 mG/sulfamethoxazole 400 mG 1 Tablet(s) Oral daily    MEDICATIONS  (PRN):  dextrose Oral Gel 15 Gram(s) Oral once PRN Blood Glucose LESS THAN 70 milliGRAM(s)/deciliter  melatonin 3 milliGRAM(s) Oral at bedtime PRN Insomnia    Allergies    No Known Allergies    Intolerances      Vital Signs Last 24 Hrs  T(C): 37.2 (16 Aug 2022 05:02), Max: 37.4 (15 Aug 2022 20:16)  T(F): 99 (16 Aug 2022 05:02), Max: 99.3 (15 Aug 2022 20:16)  HR: 85 (16 Aug 2022 05:02) (80 - 85)  BP: 155/80 (16 Aug 2022 05:02) (125/69 - 170/68)  BP(mean): --  RR: 17 (16 Aug 2022 05:02) (17 - 20)  SpO2: 92% (16 Aug 2022 09:21) (92% - 97%)    Parameters below as of 16 Aug 2022 09:21  Patient On (Oxygen Delivery Method): room air      I&O's Summary    15 Aug 2022 07:01  -  16 Aug 2022 07:00  --------------------------------------------------------  IN: 0 mL / OUT: 1000 mL / NET: -1000 mL     TELE: SR  PHYSICAL EXAM:  Constitutional: NAD, awake and alert, morbidly obese  HEENT: Moist Mucous Membranes, Anicteric  Pulmonary: Non-labored, breath sounds are clear bilaterally but diminished on right, No wheezing, rales or rhonchi  Cardiovascular: Regular, S1 and S2, No murmurs, rubs, gallops or clicks  Gastrointestinal: Bowel Sounds present, soft, nontender.   Lymph: trace +BLE edema. No lymphadenopathy.  Skin: No visible rashes. right foot ulcer with dressing dry and intact  Psych:  Mood & affect appropriate  LABS: All Labs Reviewed:                        13.5   8.21  )-----------( 212      ( 16 Aug 2022 05:52 )             41.4                         14.3   x     )-----------( x        ( 15 Aug 2022 15:02 )             44.7                         13.5   6.41  )-----------( 213      ( 15 Aug 2022 05:50 )             43.1     16 Aug 2022 05:52    138    |  100    |  20     ----------------------------<  171    4.4     |  36     |  1.10   15 Aug 2022 05:50    140    |  101    |  20     ----------------------------<  135    4.3     |  37     |  1.20   14 Aug 2022 06:29    141    |  102    |  17     ----------------------------<  111    4.6     |  36     |  1.00     Ca    9.2        16 Aug 2022 05:52  Ca    9.6        15 Aug 2022 05:50  Ca    9.4        14 Aug 2022 06:29            Patient name: LUTHER NORIEGA  YOB: 1965   Age: 51 (M)   MR#: [id number]  Study Date: 7/27/2017  Location: O/PSonographer: Susie Zuleta RDCS  Study quality: Technically difficult  Referring Physician: Kashmir Ochoa MD  Blood Pressure: 150/77 mmHg  Height: 185 cm  Weight: 136 kg  BSA: 2.6 m2  ------------------------------------------------------------------------  PROCEDURE: Transthoracic echocardiogram with 2-D, M-Mode  and complete spectral and color flow Doppler. Intravenous  catheter inserted. Verbal consent was obtained for  injection of echo contrast following a discussion of risks  and benefits. Following intravenous injection of contrast,  harmonic imaging was performed.  INDICATION: Primary pulmonary hypertension(I27.0)  ------------------------------------------------------------------------  Dimensions:    Normal Values:  LA:     4.0    2.0 - 4.0 cm  Ao:     3.5    2.0 - 3.8 cm  SEPTUM: 1.1    0.6 - 1.2 cm  PWT:    1.2    0.6 - 1.1 cm  LVIDd:  4.9    3.0 -5.6 cm  LVIDs:  3.3    1.8 - 4.0 cm  Derived variables:  LVMI: 83 g/m2  RWT: 0.48  Fractional short: 33 %  EF (Blasichpalomatz): 61 %  ------------------------------------------------------------------------  Observations:  Mitral Valve: Normal mitral valve. Minimal mitral  regurgitation.  Aortic Valve/Aorta: Normal trileaflet aortic valve.  Aortic Root: 3.5 cm.  Left Atrium: Normal left atrium.  LA volume index = 20  cc/m2.  Left Ventricle: Normal left ventricular systolic function.  No segmental wallmotion abnormalities. Endocardial  visualization enhanced with intravenous injection of echo  contrast (Definity). Increased relative wall thickness with  normal left ventricular mass index, consistent with  concentric left ventricular remodeling.  Right Heart: Normal right atrium. Right ventricular  enlargement with normal right ventricular systolic  function. Normal tricuspid valve. Minimal tricuspid  regurgitation. Normal pulmonic valve.  Pericardium/Pleura: Normal pericardium with no pericardial  effusion.  Hemodynamic: Estimated right atrial pressure is 8 mm Hg.  Estimated right ventricular systolic pressure equals 29 mm  Hg, assuming right atrial pressure equals 8 mm Hg,  consistent with normal pulmonary pressures.  ------------------------------------------------------------------------  Conclusions:  1. Increased relative wall thickness with normal left  ventricular mass index, consistent with concentric left  ventricular remodeling.  2. Normal left ventricular systolic function. No segmental  wall motion abnormalities. Endocardial visualization  enhanced with intravenous injection of echo contrast  (Definity).  ------------------------------------------------------------------------  Confirmed on  7/27/2017 - 13:49:24 by RASHEED Del Cid  --------------------------------------------------------------------

## 2022-08-16 NOTE — DISCHARGE NOTE NURSING/CASE MANAGEMENT/SOCIAL WORK - NSDCVIVACCINE_GEN_ALL_CORE_FT
influenza, injectable, quadrivalent, preservative free; 27-Nov-2015 13:32; Jv Zhu (RN); [employer]; [id number]; IntraMuscular; Deltoid Right.; 0.5 milliLiter(s); VIS (VIS Published: 07-Aug-2015, VIS Presented: 27-Nov-2015);   influenza, injectable, quadrivalent, preservative free; 22-Sep-2021 13:06; Eugene Mccarthy (RN); [employer]; [id number] (Exp. Date: 30-Jun-2022); IntraMuscular; Deltoid Left.; 0.5 milliLiter(s); VIS (VIS Published: 15-Aug-2019, VIS Presented: 22-Sep-2021);

## 2022-08-16 NOTE — PROGRESS NOTE ADULT - PROBLEM SELECTOR PLAN 1
cont current insulin pump settings  cont cons cho diet  goal bg 100-180 in hosp setting
Patient was seen and evaluated  labs reviewed  wounds are stable , no signs of infection  Wound cleaned with NS and dressed with acticoat flex and DSD  Recommend follow up with vascular as out patient  Recommend follow up with wound center upon discharge  continue with offloading the right foot with CROW boot.  Wound care instruction  1. Cleanse wound with sterile saline solution and gently pat dry.  2. Dress wound with silvercel and dry, sterile dressing  3. Change dressings daily and monitor for any signs of infection.  4. Patient is to follow up in the [facility] with Dr. Araiza or Dr. Catherine within 1 week upon discharge.
Patient presented to ED from wound care for episode of LOC with shaking, possible convulsion vs syncope  - CT negative for acute intracranial pathology   - TTE 07/2022 EF 65% with min MR/TR  - Neurology following, Dr Gill  - Cardiology , [facility] following   - Fall and seizures precautions
Patient presented to ED from wound care for episode of LOC with shaking, possible convulsion vs syncope  - No acute events overnight on remote tele   - CT negative for acute intracranial pathology   - TTE 07/2022 EF 65% with min MR/TR  - Neurology following,  Dr Gill  - Cardiology , [facility] following   - Fall and seizures precautions
Patient presented to ED from wound care for episode of LOC with shaking, possible convulsion vs syncope  - No acute events overnight on remote tele   - CT negative for acute intracranial pathology   - TTE 07/2022 EF 65% with min MR/TR  - Neurology following,  Dr Gill  - Cardiology , [facility] following   - Fall and seizures precautions

## 2022-08-16 NOTE — CONSULT NOTE ADULT - SUBJECTIVE AND OBJECTIVE BOX
Patient is a 56y old  Male who presents with a chief complaint of LOC, R pleural effusion (16 Aug 2022 11:52)    Type:1-  patient preparing to go home. unable to assess- informed a1c 8.6% "states that is better than it was". on insulin pump/dexcom      HPI:  The patient is a 55 y/o M with pmhx HTN, HLD, T1DM (on insulin pump), CKD (s/p renal transplant), CVA (with residual L sided sensation loss, R eye lid drooping), hx DVT (s/p Eliquis), September 11 related lung disease, CARMEN (on nocturnal CPAP), hx restrictive lung disease, squamous cell carcinoma OM L 4th digit s/p amputation, R 2nd digit toe amputation, R 5th digit amputation - who presents to the ED from wound care with an episode of loss of concioussness while recieving hyperbaric oxygen treatment. The patient states that he does not remember losing conciousness, only remembers waking up and being told to have sugar pills. He states this has never happened to him before. Of note, patient was supposed to fly to [facility]. Upon being told he has a R pleural effusion, patient states that he has had one pleural effusion before, which was drained and produced 600cc of fluid 2 months ago, his pulmonolgist is Dr. Boyce. He recalls being told that the workup of the fluid showed no abnormalities.     ED course  T 98.3F, HR 90, /98, RR 18, SpO2 98% r/a  Labs significant for trop 19 -> 20, Glu 209  EKG 1st deg AV block with PVC, ?RBBB  Imaging  -CXR: increasing R effusion  -CT head: no acute pathology     (12 Aug 2022 13:23)      PAST MEDICAL & SURGICAL HISTORY:  Hypertension      Hyperlipidemia      Diabetes mellitus  Type 1 on insulin pump      CKD (chronic kidney disease)  Renal Transplant 2013 at [facility]      Diabetic peripheral neuropathy      Obesity (BMI 30-39.9)      CVA (cerebral vascular accident)  Wallenberg Stroke  low L body sensation  low R face sensation  decreased peripheral vision  poor balance      Squamous cell carcinoma of skin of left ear      History of DVT (deep vein thrombosis)  s/p eliquis      Recurrent squamous cell carcinoma of skin  nose, L arm      Toe infection  2007 L 4th Toe surgery-amputation secondary to osteomyelitis      Ear disease  Left inner ear surgery, 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, Can NOT have MRI      Vasectomy status  s/p b/l  vasectomy      H/O foot surgery  2005 - right foot - bone fracture - s/p ORIF and subsequent removal of hardware      End-stage renal failure with renal transplant  12/2013      S/P kidney transplant      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          Allergies    No Known Allergies    Intolerances        MEDICATIONS  (STANDING):  aspirin enteric coated 325 milliGRAM(s) Oral daily  atorvastatin 10 milliGRAM(s) Oral at bedtime  azaTHIOprine 100 milliGRAM(s) Oral daily  cloNIDine 0.1 milliGRAM(s) Oral two times a day  dextrose 5%. 1000 milliLiter(s) (50 mL/Hr) IV Continuous <Continuous>  dextrose 5%. 1000 milliLiter(s) (100 mL/Hr) IV Continuous <Continuous>  dextrose 50% Injectable 25 Gram(s) IV Push once  dextrose 50% Injectable 12.5 Gram(s) IV Push once  dextrose 50% Injectable 25 Gram(s) IV Push once  gabapentin 300 milliGRAM(s) Oral two times a day  glucagon  Injectable 1 milliGRAM(s) IntraMuscular once  heparin   Injectable 5000 Unit(s) SubCutaneous every 8 hours  hydrALAZINE 75 milliGRAM(s) Oral two times a day  insulin lispro (HumaLOG) Pump 1 Each SubCutaneous Continuous Pump  losartan 50 milliGRAM(s) Oral daily  metoprolol tartrate 25 milliGRAM(s) Oral three times a day  tacrolimus 1.5 milliGRAM(s) Oral every 12 hours  trimethoprim   80 mG/sulfamethoxazole 400 mG 1 Tablet(s) Oral daily

## 2022-08-16 NOTE — PROGRESS NOTE ADULT - SUBJECTIVE AND OBJECTIVE BOX
Neurology follow up note    LUTHER ANDREWSO56yMale      Interval History:    Patient feels ok no new complaints.    Allergies    No Known Allergies    Intolerances        MEDICATIONS    aspirin enteric coated 325 milliGRAM(s) Oral daily  atorvastatin 10 milliGRAM(s) Oral at bedtime  azaTHIOprine 100 milliGRAM(s) Oral daily  cloNIDine 0.1 milliGRAM(s) Oral two times a day  dextrose 5%. 1000 milliLiter(s) IV Continuous <Continuous>  dextrose 5%. 1000 milliLiter(s) IV Continuous <Continuous>  dextrose 50% Injectable 25 Gram(s) IV Push once  dextrose 50% Injectable 12.5 Gram(s) IV Push once  dextrose 50% Injectable 25 Gram(s) IV Push once  dextrose Oral Gel 15 Gram(s) Oral once PRN  gabapentin 300 milliGRAM(s) Oral two times a day  glucagon  Injectable 1 milliGRAM(s) IntraMuscular once  heparin   Injectable 5000 Unit(s) SubCutaneous every 8 hours  hydrALAZINE 75 milliGRAM(s) Oral two times a day  insulin lispro (HumaLOG) Pump 1 Each SubCutaneous Continuous Pump  losartan 50 milliGRAM(s) Oral daily  melatonin 3 milliGRAM(s) Oral at bedtime PRN  metoprolol tartrate 25 milliGRAM(s) Oral three times a day  tacrolimus 1.5 milliGRAM(s) Oral every 12 hours  trimethoprim   80 mG/sulfamethoxazole 400 mG 1 Tablet(s) Oral daily              Vital Signs Last 24 Hrs  T(C): 37.2 (16 Aug 2022 05:02), Max: 37.4 (15 Aug 2022 20:16)  T(F): 99 (16 Aug 2022 05:02), Max: 99.3 (15 Aug 2022 20:16)  HR: 85 (16 Aug 2022 05:02) (75 - 85)  BP: 155/80 (16 Aug 2022 05:02) (125/69 - 170/68)  BP(mean): --  RR: 17 (16 Aug 2022 05:02) (16 - 20)  SpO2: 95% (16 Aug 2022 05:02) (94% - 97%)    Parameters below as of 16 Aug 2022 05:02  Patient On (Oxygen Delivery Method): nasal cannula  O2 Flow (L/min): 2        REVIEW OF SYSTEMS:  Constitutional:  The patient denies fever, chills, or night sweats.  Head:  No headache.  Eyes:  No double vision or blurry vision.  Ears:  No ringing in his ears.  Neck:  No neck pain.  Respiratory:  No shortness of breath.  Cardiovascular:  No chest pain.  Abdomen:  No nausea, vomiting, or abdominal pain.  Extremities/Neurological:  Decreased sensation on the left side, which is not new.    PHYSICAL EXAMINATION:   HEENT:  Head:  Normocephalic, atraumatic.  Eyes:  No scleral icterus.  Ears:  Hearing bilaterally intact.  NECK:  Supple.  RESPIRATORY:  Good air entry bilaterally.  CARDIOVASCULAR:  S1 and S2 heard.  ABDOMEN:  Soft and nontender.  EXTREMITIES:  No clubbing or cyanosis were noted.      NEUROLOGIC:  The patient is awake, alert, and oriented x3.  Extraocular movements were intact.  Pupils were equal, round, and reactive bilaterally 3 mm to 2 mm.  Speech was fluent.  Smile symmetric.  Motor:  Bilateral upper 5/5, bilateral lower 4+/5.  Sensory:  Has decreased light touch to entire left side, which is not new.  The patient has problems with his right eye, which is old, not new.                     LABS:  CBC Full  -  ( 16 Aug 2022 05:52 )  WBC Count : 8.21 K/uL  RBC Count : 4.69 M/uL  Hemoglobin : 13.5 g/dL  Hematocrit : 41.4 %  Platelet Count - Automated : 212 K/uL  Mean Cell Volume : 88.3 fl  Mean Cell Hemoglobin : 28.8 pg  Mean Cell Hemoglobin Concentration : 32.6 gm/dL  Auto Neutrophil # : 6.29 K/uL  Auto Lymphocyte # : 1.00 K/uL  Auto Monocyte # : 0.60 K/uL  Auto Eosinophil # : 0.23 K/uL  Auto Basophil # : 0.07 K/uL  Auto Neutrophil % : 76.6 %  Auto Lymphocyte % : 12.2 %  Auto Monocyte % : 7.3 %  Auto Eosinophil % : 2.8 %  Auto Basophil % : 0.9 %      08-16    138  |  100  |  20  ----------------------------<  171<H>  4.4   |  36<H>  |  1.10    Ca    9.2      16 Aug 2022 05:52      Hemoglobin A1C:       Vitamin B12         RADIOLOGY      ANALYSIS AND PLAN:  This is a 56-year-old with episode of loss of consciousness and shaking.  For episode of loss of consciousness and shaking, suspect most likely this was syncope with convulsion stiffening.  Episode was brief.  Upon arrival by emergency room staff, the patient's blood pressure was 95, and the patient felt woozy prior to event.  The patient has no prior aura to suggest underlying epilepsy, does not see any flashing lights, any weird smell or weird taste.  No report of tongue bite marks or loss of urine upon awakening.  No report of shaking at night.  For history of cerebrovascular accident, continue the patient on antiplatelet.  For history of neuropathy, continue the patient on gabapentin.  For diabetes, strict control of blood sugars.  Cardiology followup.  Pulmonary follow up Pleurocentesis Exudate - path pending  no new events     Greater than 34 minutes of time was spent with the patient, plan of care, reviewing data, with greater than 50% of the visit was spent counseling and/or coordinating care with multidisciplinary healthcare team

## 2022-08-16 NOTE — PROGRESS NOTE ADULT - ASSESSMENT
57 y/o M with pmhx HTN, HLD, T1DM (on insulin pump), CKD (s/p renal transplant), CVA (with residual L sided sensation loss, R eye lid drooping), hx DVT (s/p Eliquis), September 11 related lung disease, CARMEN (on nocturnal CPAP), hx restrictive lung disease, squamous cell carcinoma OM L 4th digit s/p amputation, R 2nd digit toe amputation, R 5th digit amputation - who presents to the ED from wound care with an episode of loss of concioussness while recieving hyperbaric oxygen     1 L drained - Exudate - path pending  vs noted  SBIRT completed    nocturnal CPAP  CARMEN management  sleep hygiene  DM care  cvs rx regimen and BP control  serial labs - renal indices   hx of renal transplant  I wayne  monitor VS and Sat

## 2022-08-16 NOTE — PROGRESS NOTE ADULT - ASSESSMENT
Assessment   ·  Problem: Open wound of plantar aspect of right foot.   ·  Recommendation:      Problem/Recommendation - 2:  ·  Problem: Neuropathy, peripheral.

## 2022-08-16 NOTE — PROGRESS NOTE ADULT - REASON FOR ADMISSION
LOC, R pleural effusion

## 2022-08-16 NOTE — DISCHARGE NOTE NURSING/CASE MANAGEMENT/SOCIAL WORK - NSDCPEFALRISK_GEN_ALL_CORE
For information on Fall & Injury Prevention, visit: [email or web address] OR  [email or web address] OR  https://www.cdc.gov/steadi/patient.html

## 2022-08-16 NOTE — PROGRESS NOTE ADULT - NS ATTEND AMEND GEN_ALL_CORE FT
pt seen and examined, agree with above
s/p thora. fu pulm. will need to monitor for reaccumulation. Further cardiac workup will depend on clinical course.
pt seen and examined, agree with above
pt seen and examined, agree with above

## 2022-08-16 NOTE — CONSULT NOTE ADULT - PROBLEM SELECTOR RECOMMENDATION 9
cont current insulin pump settings  cont cons cho diet  goal bg 100-180 in hosp setting
Patient was seen and evaluated  labs reviewed  wounds are stable , no signs of infection  Wound cleaned with NS and dressed with acticoat flex and DSD  Recommend follow up with vascular as out patient  Recommend follow up with wound center upon discharge  continue with offloading the right foot with CROW boot.  Wound care instruction  1. Cleanse wound with sterile saline solution and gently pat dry.  2. Dress wound with silvercel and dry, sterile dressing  3. Change dressings daily and monitor for any signs of infection.  4. Patient is to follow up in the [facility] with Dr. Araiza or Dr. Catherine within 1 week upon discharge.
Type 1  A1c 8.6% adm plural effusion  Recommend endocrine-Perlman onconsult  FU appt: Dr. Jasbir Garcia, pt states he has appt in september  DSC recommendations: return to home regimen and glucose monitoring  diabetes education provided as documented above  Diabetes support info and cell # [phone] given   Goal 100-180 mg/dL; 140-180 mg/dL in critical care areas
Type 1 A1c 8.6% adm pleural effusion  Recommend endocrine-Perlman on consult  FU appt: TBA  DSC recommendations: return to home regimen and glucose monitoring  Goal 100-180 mg/dL; 140-180 mg/dL in critical care areas

## 2022-08-17 ENCOUNTER — APPOINTMENT (OUTPATIENT)
Dept: HYPERBARIC MEDICINE | Facility: HOSPITAL | Age: 57
End: 2022-08-17

## 2022-08-18 ENCOUNTER — APPOINTMENT (OUTPATIENT)
Dept: HYPERBARIC MEDICINE | Facility: HOSPITAL | Age: 57
End: 2022-08-18

## 2022-08-19 ENCOUNTER — APPOINTMENT (OUTPATIENT)
Dept: HYPERBARIC MEDICINE | Facility: HOSPITAL | Age: 57
End: 2022-08-19

## 2022-08-20 LAB
CULTURE RESULTS: NO GROWTH — SIGNIFICANT CHANGE UP
SPECIMEN SOURCE: SIGNIFICANT CHANGE UP

## 2022-08-25 ENCOUNTER — APPOINTMENT (OUTPATIENT)
Dept: WOUND CARE | Facility: HOSPITAL | Age: 57
End: 2022-08-25

## 2022-08-25 ENCOUNTER — OUTPATIENT (OUTPATIENT)
Dept: OUTPATIENT SERVICES | Facility: HOSPITAL | Age: 57
LOS: 1 days | Discharge: HOME | End: 2022-08-25
Payer: MEDICARE

## 2022-08-25 VITALS
HEART RATE: 64 BPM | TEMPERATURE: 98 F | SYSTOLIC BLOOD PRESSURE: 160 MMHG | HEIGHT: 73 IN | DIASTOLIC BLOOD PRESSURE: 82 MMHG | RESPIRATION RATE: 18 BRPM | OXYGEN SATURATION: 93 % | BODY MASS INDEX: 41.75 KG/M2 | WEIGHT: 315 LBS

## 2022-08-25 DIAGNOSIS — Z94.0 KIDNEY TRANSPLANT STATUS: Chronic | ICD-10-CM

## 2022-08-25 DIAGNOSIS — N18.6 END STAGE RENAL DISEASE: Chronic | ICD-10-CM

## 2022-08-25 DIAGNOSIS — Z89.421 ACQUIRED ABSENCE OF OTHER RIGHT TOE(S): Chronic | ICD-10-CM

## 2022-08-25 DIAGNOSIS — G47.33 OBSTRUCTIVE SLEEP APNEA (ADULT) (PEDIATRIC): ICD-10-CM

## 2022-08-25 DIAGNOSIS — Z89.422 ACQUIRED ABSENCE OF OTHER LEFT TOE(S): ICD-10-CM

## 2022-08-25 DIAGNOSIS — Z86.16 PERSONAL HISTORY OF COVID-19: ICD-10-CM

## 2022-08-25 DIAGNOSIS — Z80.3 FAMILY HISTORY OF MALIGNANT NEOPLASM OF BREAST: ICD-10-CM

## 2022-08-25 DIAGNOSIS — L84 CORNS AND CALLOSITIES: ICD-10-CM

## 2022-08-25 DIAGNOSIS — E11.610 TYPE 2 DIABETES MELLITUS WITH DIABETIC NEUROPATHIC ARTHROPATHY: ICD-10-CM

## 2022-08-25 DIAGNOSIS — L97.412 NON-PRESSURE CHRONIC ULCER OF RIGHT HEEL AND MIDFOOT WITH FAT LAYER EXPOSED: ICD-10-CM

## 2022-08-25 DIAGNOSIS — Z79.899 OTHER LONG TERM (CURRENT) DRUG THERAPY: ICD-10-CM

## 2022-08-25 DIAGNOSIS — E11.51 TYPE 2 DIABETES MELLITUS WITH DIABETIC PERIPHERAL ANGIOPATHY WITHOUT GANGRENE: ICD-10-CM

## 2022-08-25 DIAGNOSIS — Z79.82 LONG TERM (CURRENT) USE OF ASPIRIN: ICD-10-CM

## 2022-08-25 DIAGNOSIS — Z86.718 PERSONAL HISTORY OF OTHER VENOUS THROMBOSIS AND EMBOLISM: ICD-10-CM

## 2022-08-25 DIAGNOSIS — Z89.421 ACQUIRED ABSENCE OF OTHER RIGHT TOE(S): ICD-10-CM

## 2022-08-25 DIAGNOSIS — Z83.3 FAMILY HISTORY OF DIABETES MELLITUS: ICD-10-CM

## 2022-08-25 DIAGNOSIS — E11.621 TYPE 2 DIABETES MELLITUS WITH FOOT ULCER: ICD-10-CM

## 2022-08-25 DIAGNOSIS — E78.5 HYPERLIPIDEMIA, UNSPECIFIED: ICD-10-CM

## 2022-08-25 DIAGNOSIS — Z79.4 LONG TERM (CURRENT) USE OF INSULIN: ICD-10-CM

## 2022-08-25 DIAGNOSIS — Z87.81 PERSONAL HISTORY OF (HEALED) TRAUMATIC FRACTURE: ICD-10-CM

## 2022-08-25 DIAGNOSIS — Z86.73 PERSONAL HISTORY OF TRANSIENT ISCHEMIC ATTACK (TIA), AND CEREBRAL INFARCTION WITHOUT RESIDUAL DEFICITS: ICD-10-CM

## 2022-08-25 DIAGNOSIS — Z94.0 KIDNEY TRANSPLANT STATUS: ICD-10-CM

## 2022-08-25 DIAGNOSIS — Z85.828 PERSONAL HISTORY OF OTHER MALIGNANT NEOPLASM OF SKIN: ICD-10-CM

## 2022-08-25 DIAGNOSIS — Z80.8 FAMILY HISTORY OF MALIGNANT NEOPLASM OF OTHER ORGANS OR SYSTEMS: ICD-10-CM

## 2022-08-25 PROCEDURE — 99213 OFFICE O/P EST LOW 20 MIN: CPT

## 2022-08-25 PROCEDURE — G0463: CPT

## 2022-08-25 NOTE — ASSESSMENT
[Verbal] : Verbal [Demo] : Demo [Patient] : Patient [Good - alert, interested, motivated] : Good - alert, interested, motivated [Demonstrates independently] : demonstrates independently [Dressing changes] : dressing changes [Foot Care] : foot care [Skin Care] : skin care [Pressure relief] : pressure relief [Signs and symptoms of infection] : sign and symptoms of infection [How and When to Call] : how and when to call [Hyperbaric Therapy] : hyperbaric therapy [Off-loading] : off-loading [Compression Therapy] : compression therapy [Patient responsibility to plan of care] : patient responsibility to plan of care [Glycemic Control] : glycemic control [] : Yes [Stable] : stable [Home] : Home [Ambulatory] : Ambulatory [Nutrition] : nutrition [FreeTextEntry2] : Offloading/Pressure Relief  \par Infection Prevention \par Compression Therapy \par Encourage Glucose Control  [FreeTextEntry4] : Patient has completed 37/50 HBOT. Pt s/w Dr. Catherine in regards to not completing HBO due previous reaction to treatment & wound progression.\par At Pt's last HBOT on 8/12/22 Pt was admitted to the hospital until 8/16/22. Pt had a liter of fluid aspirated from around his lungs. \par Pt to F/U to [facility] in 1 Week

## 2022-08-25 NOTE — PHYSICAL EXAM
[4 x 4] : 4 x 4  [Abdominal Pad] : Abdominal Pad [0] : left 0 [1+] : left 1+ [Varicose Veins Of Lower Extremities] : not present [] : not present [Purpura] : no purpura  [Petechiae] : no petechiae [Skin Ulcer] : ulcer [Skin Induration] : no induration [Alert] : alert [Oriented to Person] : oriented to person [Oriented to Place] : oriented to place [Oriented to Time] : oriented to time [Calm] : calm [de-identified] : adult WM, NAD, alert, Ox 3. [de-identified] : HTN, HLD [de-identified] : Diabetic Charcot right foot deformity [de-identified] : right foot plantar ulcer 5th metatarsal healing ulcer down to skin, subcutaneous tissue, and fat. ulcer head of 5th metatarsal closed. [de-identified] : Diabetic neuropathy  [FreeTextEntry1] : Lateral Foot 5th Met- Resolved [de-identified] : callus [de-identified] : No neurovascular deficits noted  [de-identified] : No Treatment  [de-identified] : Mechanically Cleansed with Sterile Gauze & Normal Saline \par Callus shaved by KYLE [de-identified] : \par  [FreeTextEntry7] :  Plantar Foot  [FreeTextEntry8] : 0.8 [FreeTextEntry9] : 1.6 [de-identified] : 0.2 [de-identified] : Serosanguineous [de-identified] : Callus- Removed [de-identified] : No neurovascular deficits noted  [de-identified] : Nai [de-identified] : Mechanically Cleansed with Sterile Gauze & Normal Saline \par \par Callus shaved by DPM\par \par  [de-identified] :  \par  [TWNoteComboBox1] : Right [TWNoteComboBox4] : None [TWNoteComboBox5] : Yes [TWNoteComboBox6] : Other [de-identified] : Yes [de-identified] : other [de-identified] : None [de-identified] : None [de-identified] : 100% [de-identified] : No [de-identified] : Ace wraps [de-identified] : 3x Weekly [de-identified] : Secondary Dressing [TWNoteComboBox9] : Right [de-identified] : Small [de-identified] : Diabetic [de-identified] : other [de-identified] : None [de-identified] : None [de-identified] : 100% [de-identified] : Ace wraps [de-identified] : Every other day [de-identified] : Primary Dressing

## 2022-08-25 NOTE — HISTORY OF PRESENT ILLNESS
[FreeTextEntry1] : Pt seen for DFU plantar base 5th metatarsal right foot down to skin, subcutaneous tissue, fat. Pt also seen for right 5th metatarsal head lateral surgical wound which has closed at this time.

## 2022-08-31 NOTE — PLAN
General
[FreeTextEntry1] : Explained at length with patient and wife all of the patient co morbid factors . Patient has been diabetic for over 30 years has had kidney transplant , morbid obesity , diabetic Charcot foot . Patient is advanced immunocompromised state and diabetic pathology is continuing and worsening . Patient and wife understand . Patient high risk for limb loss and life threatening sepsis .Continue wound care , off loading , . peripheral callus shaved with # 15 blade. PTR one week  Spent 20 minutes for patient care and medical decision making.\par

## 2022-09-01 ENCOUNTER — APPOINTMENT (OUTPATIENT)
Dept: WOUND CARE | Facility: HOSPITAL | Age: 57
End: 2022-09-01

## 2022-09-01 ENCOUNTER — OUTPATIENT (OUTPATIENT)
Dept: OUTPATIENT SERVICES | Facility: HOSPITAL | Age: 57
LOS: 1 days | Discharge: HOME | End: 2022-09-01
Payer: MEDICARE

## 2022-09-01 VITALS
BODY MASS INDEX: 41.75 KG/M2 | HEART RATE: 62 BPM | SYSTOLIC BLOOD PRESSURE: 131 MMHG | OXYGEN SATURATION: 95 % | DIASTOLIC BLOOD PRESSURE: 70 MMHG | RESPIRATION RATE: 20 BRPM | WEIGHT: 315 LBS | TEMPERATURE: 97.5 F | HEIGHT: 73 IN

## 2022-09-01 DIAGNOSIS — G47.33 OBSTRUCTIVE SLEEP APNEA (ADULT) (PEDIATRIC): ICD-10-CM

## 2022-09-01 DIAGNOSIS — Z89.421 ACQUIRED ABSENCE OF OTHER RIGHT TOE(S): Chronic | ICD-10-CM

## 2022-09-01 DIAGNOSIS — Z86.718 PERSONAL HISTORY OF OTHER VENOUS THROMBOSIS AND EMBOLISM: ICD-10-CM

## 2022-09-01 DIAGNOSIS — Z80.8 FAMILY HISTORY OF MALIGNANT NEOPLASM OF OTHER ORGANS OR SYSTEMS: ICD-10-CM

## 2022-09-01 DIAGNOSIS — Z79.4 LONG TERM (CURRENT) USE OF INSULIN: ICD-10-CM

## 2022-09-01 DIAGNOSIS — Z79.899 OTHER LONG TERM (CURRENT) DRUG THERAPY: ICD-10-CM

## 2022-09-01 DIAGNOSIS — L84 CORNS AND CALLOSITIES: ICD-10-CM

## 2022-09-01 DIAGNOSIS — Z94.0 KIDNEY TRANSPLANT STATUS: ICD-10-CM

## 2022-09-01 DIAGNOSIS — E11.621 TYPE 2 DIABETES MELLITUS WITH FOOT ULCER: ICD-10-CM

## 2022-09-01 DIAGNOSIS — Z87.81 PERSONAL HISTORY OF (HEALED) TRAUMATIC FRACTURE: ICD-10-CM

## 2022-09-01 DIAGNOSIS — Z89.422 ACQUIRED ABSENCE OF OTHER LEFT TOE(S): ICD-10-CM

## 2022-09-01 DIAGNOSIS — E11.51 TYPE 2 DIABETES MELLITUS WITH DIABETIC PERIPHERAL ANGIOPATHY WITHOUT GANGRENE: ICD-10-CM

## 2022-09-01 DIAGNOSIS — Z86.16 PERSONAL HISTORY OF COVID-19: ICD-10-CM

## 2022-09-01 DIAGNOSIS — Z83.3 FAMILY HISTORY OF DIABETES MELLITUS: ICD-10-CM

## 2022-09-01 DIAGNOSIS — Z94.0 KIDNEY TRANSPLANT STATUS: Chronic | ICD-10-CM

## 2022-09-01 DIAGNOSIS — E78.5 HYPERLIPIDEMIA, UNSPECIFIED: ICD-10-CM

## 2022-09-01 DIAGNOSIS — Z86.73 PERSONAL HISTORY OF TRANSIENT ISCHEMIC ATTACK (TIA), AND CEREBRAL INFARCTION WITHOUT RESIDUAL DEFICITS: ICD-10-CM

## 2022-09-01 DIAGNOSIS — E11.610 TYPE 2 DIABETES MELLITUS WITH DIABETIC NEUROPATHIC ARTHROPATHY: ICD-10-CM

## 2022-09-01 DIAGNOSIS — Z80.3 FAMILY HISTORY OF MALIGNANT NEOPLASM OF BREAST: ICD-10-CM

## 2022-09-01 DIAGNOSIS — Z79.82 LONG TERM (CURRENT) USE OF ASPIRIN: ICD-10-CM

## 2022-09-01 DIAGNOSIS — Z89.421 ACQUIRED ABSENCE OF OTHER RIGHT TOE(S): ICD-10-CM

## 2022-09-01 DIAGNOSIS — Z85.828 PERSONAL HISTORY OF OTHER MALIGNANT NEOPLASM OF SKIN: ICD-10-CM

## 2022-09-01 DIAGNOSIS — N18.6 END STAGE RENAL DISEASE: Chronic | ICD-10-CM

## 2022-09-01 DIAGNOSIS — L97.412 NON-PRESSURE CHRONIC ULCER OF RIGHT HEEL AND MIDFOOT WITH FAT LAYER EXPOSED: ICD-10-CM

## 2022-09-01 PROCEDURE — G0463: CPT

## 2022-09-01 PROCEDURE — 99213 OFFICE O/P EST LOW 20 MIN: CPT

## 2022-09-01 RX ORDER — AMOXICILLIN 500 MG/1
500 CAPSULE ORAL TWICE DAILY
Refills: 0 | Status: DISCONTINUED | COMMUNITY
End: 2022-09-01

## 2022-09-01 NOTE — PHYSICAL EXAM
[0] : left 0 [1+] : left 1+ [Skin Ulcer] : ulcer [Alert] : alert [Oriented to Person] : oriented to person [Oriented to Place] : oriented to place [Oriented to Time] : oriented to time [Calm] : calm [4 x 4] : 4 x 4  [Abdominal Pad] : Abdominal Pad [Varicose Veins Of Lower Extremities] : not present [] : not present [Purpura] : no purpura  [Petechiae] : no petechiae [Skin Induration] : no induration [de-identified] : adult WM, NAD, alert, Ox 3. [de-identified] : HTN, HLD [de-identified] : Diabetic Charcot right foot deformity [de-identified] : right foot plantar ulcer 5th metatarsal healing ulcer down to skin, subcutaneous tissue, and fat. ulcer head of 5th metatarsal closed. [de-identified] : Diabetic neuropathy  [FreeTextEntry7] : Right plantar foot  [FreeTextEntry8] : 0.7 [FreeTextEntry9] : 1.3 [de-identified] : 0.2 [de-identified] : Scant Serous/sanguinous [de-identified] : Callus  [de-identified] : Nai  [de-identified] : Mechanically cleansed with sterile gauze and normal saline.\par Kerlix  [de-identified] : other [de-identified] : None [de-identified] : None [de-identified] : 100% [de-identified] : No [de-identified] : Every other day [de-identified] : Primary Dressing

## 2022-09-01 NOTE — PLAN
[FreeTextEntry1] : Patient examined and evaluated at this time.\par Continue local wound care and offloading.\par Spent 20 minutes for patient care and medical decision making.\par Patient to follow up in 2 weeks.\par

## 2022-09-01 NOTE — REVIEW OF SYSTEMS
[Fever] : no fever [Chills] : no chills [Eye Pain] : no eye pain [Loss Of Hearing] : no hearing loss [Shortness Of Breath] : no shortness of breath [Abdominal Pain] : no abdominal pain [Vomiting] : no vomiting [Skin Lesions] : no skin lesions [Skin Wound] : no skin wound [Anxiety] : no anxiety [Easy Bleeding] : no tendency for easy bleeding [FreeTextEntry5] : HTN,HLD [FreeTextEntry7] : 40.9 BMI , morbid obesity  [FreeTextEntry8] : Kidney Transplant  [FreeTextEntry9] : Associated Diabetic Charcot foot  [de-identified] : plantar right foot midfoot , lateral  DFU 2  , large area of peripheral callus build up , the ulcer is clean and granular s/p surgical intervention  [de-identified] : IDDM with neuropathy  [de-identified] : MARILUZ

## 2022-09-01 NOTE — ASSESSMENT
[Verbal] : Verbal [Written] : Written [Demo] : Demo [Patient] : Patient [Good - alert, interested, motivated] : Good - alert, interested, motivated [Verbalizes knowledge/Understanding] : Verbalizes knowledge/understanding [Dressing changes] : dressing changes [Foot Care] : foot care [Skin Care] : skin care [Signs and symptoms of infection] : sign and symptoms of infection [Nutrition] : nutrition [How and When to Call] : how and when to call [Pain Management] : pain management [Off-loading] : off-loading [Patient responsibility to plan of care] : patient responsibility to plan of care [Glycemic Control] : glycemic control [Stable] : stable [Home] : Home [Ambulatory] : Ambulatory [Not Applicable - Long Term Care/Home Health Agency] : Long Term Care/Home Health Agency: Not Applicable [] : No [FreeTextEntry2] : Infection prevention\par Localized wound care \par Goal remaining pain free regarding wounds\par Collagen matrix therapy \par Offloading  [FreeTextEntry4] : Follow up in 2 weeks

## 2022-09-14 ENCOUNTER — APPOINTMENT (OUTPATIENT)
Dept: NEPHROLOGY | Facility: CLINIC | Age: 57
End: 2022-09-14

## 2022-09-14 ENCOUNTER — NON-APPOINTMENT (OUTPATIENT)
Age: 57
End: 2022-09-14

## 2022-09-14 LAB
CULTURE RESULTS: SIGNIFICANT CHANGE UP
SPECIMEN SOURCE: SIGNIFICANT CHANGE UP

## 2022-09-14 PROCEDURE — 99214 OFFICE O/P EST MOD 30 MIN: CPT | Mod: 95

## 2022-09-14 NOTE — REASON FOR VISIT
[Follow-Up] : a follow-up visit [FreeTextEntry1] : Post kidney transplant follow up- has had recurrent pleural effusion

## 2022-09-14 NOTE — HISTORY OF PRESENT ILLNESS
[Home] : at home, [unfilled] , at the time of the visit. [Medical Office: ([facility])___] : at the medical office located in  [Verbal consent obtained from patient] : the patient, [unfilled] [FreeTextEntry1] : This visit is provided by telehealth using real time 2 way audio visual technology. This patient is located at home and the provider is located at the [facility] medical office at [address] [place] [address]. Patient and family member participated in this visit.\par Verbal consent for this visit was given by patient/accompanying family member\par Total duration of this visit which included conversation, lab review, medication review and clinical assessment and advice lasted approximately 25-30 minutes.\par \par \par \par Currently:\par \par Patient feels well\par Reviewed for acute symptoms and recent events.\par He had right pleural effusion tapped twice, most recent in August at [facility].\par \par \par He has been on hyperbaric oxygen therapy for right foot ulcer (9 months- healing, followed by Wound care team at [facility])\par \par Had hospitalization at [facility] in August.\par \par Wok up so far- had pleural fluid- negative culture\par Has seen pulmonologist Austen from 9/11 team and has appointment with [facility] team for continued work up\par \par \par Had cardiology eval , echo in July EF 75%\par \par Noted CT from February, mild splenomegaly, normal liver\par \par \par Urine studies in August: protein: 30\par Serum creatinine 1.1 mg/dl\par Serum albumin 3.6 g/dl, noted elevated globulin\par \par His medications are reviewed and no recent changes. He is on hydralazine.\par \par \par \par Independent for ADL. Not driving. Able to walk. No SOB at rest.\par \par \par I reviewed current home  blood pressure and home glucose control and medications.\par \par \par On Telehealth visit:\par Patient appears comfortable\par No distress, not dyspneic\par Conscious, alert, oriented X 3\par Speech: Normal\par Other observations as noted- ankle edema, right plantar ulcer\par \par Reviewed last lab data \par

## 2022-09-14 NOTE — ASSESSMENT
[FreeTextEntry1] : Clinical Impression:\par Kidney Transplant recipient, functioning allograft, normal creatinine, minimal proteinuria\par Immunosuppression- Reviewed and unchanged\par DM Controlled.\par HTN controlled\par Recurrent right pleural effusion- under evaluation\par Right foot ulcer, on wound care follow up\par \par Plan:\par Immunosuppression continued\par Continue current medications\par additional changes - If no alternative causes are found will switch from hydralazine\par Labs and follow up visit to be scheduled as discussed. Has f/u in Dec 2022\par Discussed evaluation for pleural effusion, will consult [facility] for it.\par \par \par

## 2022-09-15 ENCOUNTER — OUTPATIENT (OUTPATIENT)
Dept: OUTPATIENT SERVICES | Facility: HOSPITAL | Age: 57
LOS: 1 days | Discharge: HOME | End: 2022-09-15
Payer: MEDICARE

## 2022-09-15 ENCOUNTER — APPOINTMENT (OUTPATIENT)
Dept: WOUND CARE | Facility: HOSPITAL | Age: 57
End: 2022-09-15

## 2022-09-15 ENCOUNTER — RESULT REVIEW (OUTPATIENT)
Age: 57
End: 2022-09-15

## 2022-09-15 VITALS
OXYGEN SATURATION: 94 % | SYSTOLIC BLOOD PRESSURE: 143 MMHG | DIASTOLIC BLOOD PRESSURE: 72 MMHG | TEMPERATURE: 97.9 F | HEIGHT: 73 IN | HEART RATE: 61 BPM | WEIGHT: 315 LBS | RESPIRATION RATE: 20 BRPM | BODY MASS INDEX: 41.75 KG/M2

## 2022-09-15 DIAGNOSIS — Z89.421 ACQUIRED ABSENCE OF OTHER RIGHT TOE(S): Chronic | ICD-10-CM

## 2022-09-15 DIAGNOSIS — Z94.0 KIDNEY TRANSPLANT STATUS: Chronic | ICD-10-CM

## 2022-09-15 DIAGNOSIS — N18.6 END STAGE RENAL DISEASE: Chronic | ICD-10-CM

## 2022-09-15 DIAGNOSIS — E11.621 TYPE 2 DIABETES MELLITUS WITH FOOT ULCER: ICD-10-CM

## 2022-09-15 PROCEDURE — 73630 X-RAY EXAM OF FOOT: CPT

## 2022-09-15 PROCEDURE — 73630 X-RAY EXAM OF FOOT: CPT | Mod: 26,RT

## 2022-09-15 PROCEDURE — G0463: CPT

## 2022-09-15 PROCEDURE — 99213 OFFICE O/P EST LOW 20 MIN: CPT

## 2022-09-20 DIAGNOSIS — L97.512 NON-PRESSURE CHRONIC ULCER OF OTHER PART OF RIGHT FOOT WITH FAT LAYER EXPOSED: ICD-10-CM

## 2022-09-20 DIAGNOSIS — Z79.899 OTHER LONG TERM (CURRENT) DRUG THERAPY: ICD-10-CM

## 2022-09-20 DIAGNOSIS — Z86.718 PERSONAL HISTORY OF OTHER VENOUS THROMBOSIS AND EMBOLISM: ICD-10-CM

## 2022-09-20 DIAGNOSIS — L84 CORNS AND CALLOSITIES: ICD-10-CM

## 2022-09-20 DIAGNOSIS — Z89.422 ACQUIRED ABSENCE OF OTHER LEFT TOE(S): ICD-10-CM

## 2022-09-20 DIAGNOSIS — Z86.16 PERSONAL HISTORY OF COVID-19: ICD-10-CM

## 2022-09-20 DIAGNOSIS — Z85.828 PERSONAL HISTORY OF OTHER MALIGNANT NEOPLASM OF SKIN: ICD-10-CM

## 2022-09-20 DIAGNOSIS — Z86.73 PERSONAL HISTORY OF TRANSIENT ISCHEMIC ATTACK (TIA), AND CEREBRAL INFARCTION WITHOUT RESIDUAL DEFICITS: ICD-10-CM

## 2022-09-20 DIAGNOSIS — Z79.4 LONG TERM (CURRENT) USE OF INSULIN: ICD-10-CM

## 2022-09-20 DIAGNOSIS — Z80.8 FAMILY HISTORY OF MALIGNANT NEOPLASM OF OTHER ORGANS OR SYSTEMS: ICD-10-CM

## 2022-09-20 DIAGNOSIS — Z87.81 PERSONAL HISTORY OF (HEALED) TRAUMATIC FRACTURE: ICD-10-CM

## 2022-09-20 DIAGNOSIS — Z94.0 KIDNEY TRANSPLANT STATUS: ICD-10-CM

## 2022-09-20 DIAGNOSIS — Z80.3 FAMILY HISTORY OF MALIGNANT NEOPLASM OF BREAST: ICD-10-CM

## 2022-09-20 DIAGNOSIS — Z79.82 LONG TERM (CURRENT) USE OF ASPIRIN: ICD-10-CM

## 2022-09-20 DIAGNOSIS — E11.610 TYPE 2 DIABETES MELLITUS WITH DIABETIC NEUROPATHIC ARTHROPATHY: ICD-10-CM

## 2022-09-20 DIAGNOSIS — E11.621 TYPE 2 DIABETES MELLITUS WITH FOOT ULCER: ICD-10-CM

## 2022-09-20 DIAGNOSIS — Z89.421 ACQUIRED ABSENCE OF OTHER RIGHT TOE(S): ICD-10-CM

## 2022-09-20 DIAGNOSIS — L97.412 NON-PRESSURE CHRONIC ULCER OF RIGHT HEEL AND MIDFOOT WITH FAT LAYER EXPOSED: ICD-10-CM

## 2022-09-20 DIAGNOSIS — G47.33 OBSTRUCTIVE SLEEP APNEA (ADULT) (PEDIATRIC): ICD-10-CM

## 2022-09-20 DIAGNOSIS — E11.51 TYPE 2 DIABETES MELLITUS WITH DIABETIC PERIPHERAL ANGIOPATHY WITHOUT GANGRENE: ICD-10-CM

## 2022-09-20 DIAGNOSIS — E78.5 HYPERLIPIDEMIA, UNSPECIFIED: ICD-10-CM

## 2022-09-20 DIAGNOSIS — Z83.3 FAMILY HISTORY OF DIABETES MELLITUS: ICD-10-CM

## 2022-09-20 NOTE — VITALS
[Pain related to present condition?] : The patient's  pain is not related to present condition. [] : No [de-identified] : 0/10

## 2022-09-20 NOTE — HISTORY OF PRESENT ILLNESS
[FreeTextEntry1] : Pt seen for DFU plantar base 5th metatarsal right foot down to skin, subcutaneous tissue, fat. Pt also seen for right 5th metatarsal head lateral surgical wound which has reopened down to skin, subcutaneous tissue, and fat.

## 2022-09-20 NOTE — REVIEW OF SYSTEMS
[Fever] : no fever [Chills] : no chills [Eye Pain] : no eye pain [Loss Of Hearing] : no hearing loss [Shortness Of Breath] : no shortness of breath [Abdominal Pain] : no abdominal pain [Vomiting] : no vomiting [Skin Lesions] : no skin lesions [Skin Wound] : no skin wound [Anxiety] : no anxiety [Easy Bleeding] : no tendency for easy bleeding [FreeTextEntry5] : HTN,HLD [FreeTextEntry7] : 40.9 BMI , morbid obesity  [FreeTextEntry8] : Kidney Transplant  [FreeTextEntry9] : Associated Diabetic Charcot foot  [de-identified] : plantar right foot midfoot , lateral  DFU 2  , large area of peripheral callus build up , the ulcer is clean and granular s/p surgical intervention  [de-identified] : IDDM with neuropathy  [de-identified] : MARILUZ

## 2022-09-20 NOTE — ASSESSMENT
[Verbal] : Verbal [Written] : Written [Demo] : Demo [Patient] : Patient [Good - alert, interested, motivated] : Good - alert, interested, motivated [Verbalizes knowledge/Understanding] : Verbalizes knowledge/understanding [Dressing changes] : dressing changes [Foot Care] : foot care [Skin Care] : skin care [Signs and symptoms of infection] : sign and symptoms of infection [Nutrition] : nutrition [How and When to Call] : how and when to call [Pain Management] : pain management [Off-loading] : off-loading [Patient responsibility to plan of care] : patient responsibility to plan of care [Glycemic Control] : glycemic control [Stable] : stable [Home] : Home [Ambulatory] : Ambulatory [Not Applicable - Long Term Care/Home Health Agency] : Long Term Care/Home Health Agency: Not Applicable [] : No [FreeTextEntry2] : Infection prevention\par Localized wound care \par Goal remaining pain free regarding wounds\par Collagen matrix therapy \par Offloading \par Xray\par F/U 1 week [FreeTextEntry4] : DPM ordered an xray of the right foot 5th digit area. to be performed before the next visit.\par F/U 1 week

## 2022-09-20 NOTE — PLAN
[FreeTextEntry1] : Patient examined and evaluated at this time.\par Continue local wound care and offloading.\par Patient sent for right foot radiographs.\par Spent 30 minutes for patient care and medical decision making.\par Patient to follow up in 1 week.

## 2022-09-22 ENCOUNTER — APPOINTMENT (OUTPATIENT)
Dept: WOUND CARE | Facility: HOSPITAL | Age: 57
End: 2022-09-22

## 2022-09-22 ENCOUNTER — OUTPATIENT (OUTPATIENT)
Dept: OUTPATIENT SERVICES | Facility: HOSPITAL | Age: 57
LOS: 1 days | Discharge: HOME | End: 2022-09-22
Payer: MEDICARE

## 2022-09-22 VITALS
HEIGHT: 73 IN | DIASTOLIC BLOOD PRESSURE: 62 MMHG | RESPIRATION RATE: 20 BRPM | HEART RATE: 61 BPM | OXYGEN SATURATION: 94 % | TEMPERATURE: 98.1 F | WEIGHT: 315 LBS | SYSTOLIC BLOOD PRESSURE: 124 MMHG | BODY MASS INDEX: 41.75 KG/M2

## 2022-09-22 DIAGNOSIS — Z94.0 KIDNEY TRANSPLANT STATUS: Chronic | ICD-10-CM

## 2022-09-22 DIAGNOSIS — Z89.421 ACQUIRED ABSENCE OF OTHER RIGHT TOE(S): Chronic | ICD-10-CM

## 2022-09-22 DIAGNOSIS — E11.621 TYPE 2 DIABETES MELLITUS WITH FOOT ULCER: ICD-10-CM

## 2022-09-22 DIAGNOSIS — N18.6 END STAGE RENAL DISEASE: Chronic | ICD-10-CM

## 2022-09-22 PROCEDURE — G0463: CPT

## 2022-09-22 PROCEDURE — 99213 OFFICE O/P EST LOW 20 MIN: CPT

## 2022-09-22 NOTE — REVIEW OF SYSTEMS
[Fever] : no fever [Chills] : no chills [Eye Pain] : no eye pain [Loss Of Hearing] : no hearing loss [Shortness Of Breath] : no shortness of breath [Abdominal Pain] : no abdominal pain [Vomiting] : no vomiting [Skin Lesions] : no skin lesions [Skin Wound] : no skin wound [Anxiety] : no anxiety [Easy Bleeding] : no tendency for easy bleeding [FreeTextEntry5] : HTN,HLD [FreeTextEntry7] : 40.9 BMI , morbid obesity  [FreeTextEntry8] : Kidney Transplant  [FreeTextEntry9] : Associated Diabetic Charcot foot  [de-identified] : plantar right foot midfoot , lateral  DFU 2  , large area of peripheral callus build up , the ulcer is clean and granular s/p surgical intervention  [de-identified] : IDDM with neuropathy  [de-identified] : MARILUZ

## 2022-09-22 NOTE — ASSESSMENT
[Verbal] : Verbal [Written] : Written [Demo] : Demo [Patient] : Patient [Good - alert, interested, motivated] : Good - alert, interested, motivated [Verbalizes knowledge/Understanding] : Verbalizes knowledge/understanding [Dressing changes] : dressing changes [Foot Care] : foot care [Skin Care] : skin care [Signs and symptoms of infection] : sign and symptoms of infection [Nutrition] : nutrition [How and When to Call] : how and when to call [Pain Management] : pain management [Off-loading] : off-loading [Patient responsibility to plan of care] : patient responsibility to plan of care [Glycemic Control] : glycemic control [Stable] : stable [Home] : Home [Ambulatory] : Ambulatory [Not Applicable - Long Term Care/Home Health Agency] : Long Term Care/Home Health Agency: Not Applicable [] : No [FreeTextEntry2] : Localized Wound Care \par Infection Prevention \par Offloading/Pressure Relief \par Surgical procedure [FreeTextEntry4] : DPM reviewed X-ray results with Pt. Pt Verbalized Understanding \par DPM discussed possible surgical procedure with Pt. Pt Verbalized Understanding \par Pt to F/U to [facility] in 1 Week

## 2022-09-22 NOTE — PHYSICAL EXAM
[2 x 2] : 2 x 2  [4 x 4] : 4 x 4  [Abdominal Pad] : Abdominal Pad [0] : left 0 [1+] : left 1+ [Skin Ulcer] : ulcer [Alert] : alert [Oriented to Person] : oriented to person [Oriented to Place] : oriented to place [Oriented to Time] : oriented to time [Calm] : calm [Varicose Veins Of Lower Extremities] : not present [] : not present [Purpura] : no purpura  [Petechiae] : no petechiae [Skin Induration] : no induration [de-identified] : adult WM, NAD, alert, Ox 3. [de-identified] : HTN, HLD [de-identified] : Diabetic Charcot right foot deformity [de-identified] : right foot plantar ulcer 5th metatarsal healing ulcer down to skin, subcutaneous tissue, and fat. ulcer lateral head of 5th metatarsal down to skin, subcutaneous tissue, fat. [de-identified] : Diabetic neuropathy  [FreeTextEntry1] : Foot 5th Lateral Digit [FreeTextEntry2] : 0.7 [FreeTextEntry3] : 0.9 [FreeTextEntry4] : 0.2 [de-identified] : serosanguineous [de-identified] : Callus- removed by DPM [de-identified] : Silver Alginate [de-identified] : Mechanically Cleansed with Sterile Gauze & Normal Saline  [FreeTextEntry7] : Plantar Foot  [FreeTextEntry8] : 0.2 [FreeTextEntry9] : 0.5 [de-identified] : 0.2 [de-identified] : Scant Serous/sanguinous [de-identified] : Callus- removed by DPM  [de-identified] : Silver Alginate [de-identified] : Mechanically Cleansed with Sterile Gauze & Normal Saline \par Kerlix  [TWNoteComboBox1] : Right [TWNoteComboBox4] : Small [TWNoteComboBox5] : No [TWNoteComboBox6] : Pressure [de-identified] : No [de-identified] : other [de-identified] : Mild [de-identified] : 100% [de-identified] : 3x Weekly [de-identified] : False [TWNoteComboBox9] : Right [de-identified] : other [de-identified] : None [de-identified] : None [de-identified] : 100% [de-identified] : No [de-identified] : 3x Weekly [de-identified] : False

## 2022-09-22 NOTE — PLAN
[FreeTextEntry1] : Patient examined and evaluated at this time.\par Continue local wound care and offloading.\par Radiographs discussed with patient. Advised regarding surgical excision of right 5th proximal phalanx base if wound does not resolve for biomechanical reason.\par All questions answered to satisfaction and patient verbalized understanding.\par Spent 20 minutes for patient care and medical decision making.\par Patient to follow up in 1 week.

## 2022-09-22 NOTE — HISTORY OF PRESENT ILLNESS
[FreeTextEntry1] : Pt seen for DFU plantar base 5th metatarsal right foot down to skin, subcutaneous tissue, fat. Pt also seen for right 5th metatarsal head lateral surgical wound down to skin, subcutaneous tissue, and fat.

## 2022-09-23 DIAGNOSIS — E78.5 HYPERLIPIDEMIA, UNSPECIFIED: ICD-10-CM

## 2022-09-23 DIAGNOSIS — Z89.421 ACQUIRED ABSENCE OF OTHER RIGHT TOE(S): ICD-10-CM

## 2022-09-23 DIAGNOSIS — E11.51 TYPE 2 DIABETES MELLITUS WITH DIABETIC PERIPHERAL ANGIOPATHY WITHOUT GANGRENE: ICD-10-CM

## 2022-09-23 DIAGNOSIS — Z87.81 PERSONAL HISTORY OF (HEALED) TRAUMATIC FRACTURE: ICD-10-CM

## 2022-09-23 DIAGNOSIS — E11.610 TYPE 2 DIABETES MELLITUS WITH DIABETIC NEUROPATHIC ARTHROPATHY: ICD-10-CM

## 2022-09-23 DIAGNOSIS — Z83.3 FAMILY HISTORY OF DIABETES MELLITUS: ICD-10-CM

## 2022-09-23 DIAGNOSIS — Z80.3 FAMILY HISTORY OF MALIGNANT NEOPLASM OF BREAST: ICD-10-CM

## 2022-09-23 DIAGNOSIS — Z79.4 LONG TERM (CURRENT) USE OF INSULIN: ICD-10-CM

## 2022-09-23 DIAGNOSIS — L84 CORNS AND CALLOSITIES: ICD-10-CM

## 2022-09-23 DIAGNOSIS — Z79.82 LONG TERM (CURRENT) USE OF ASPIRIN: ICD-10-CM

## 2022-09-23 DIAGNOSIS — Z86.16 PERSONAL HISTORY OF COVID-19: ICD-10-CM

## 2022-09-23 DIAGNOSIS — L97.512 NON-PRESSURE CHRONIC ULCER OF OTHER PART OF RIGHT FOOT WITH FAT LAYER EXPOSED: ICD-10-CM

## 2022-09-23 DIAGNOSIS — Z86.73 PERSONAL HISTORY OF TRANSIENT ISCHEMIC ATTACK (TIA), AND CEREBRAL INFARCTION WITHOUT RESIDUAL DEFICITS: ICD-10-CM

## 2022-09-23 DIAGNOSIS — E11.621 TYPE 2 DIABETES MELLITUS WITH FOOT ULCER: ICD-10-CM

## 2022-09-23 DIAGNOSIS — Z86.718 PERSONAL HISTORY OF OTHER VENOUS THROMBOSIS AND EMBOLISM: ICD-10-CM

## 2022-09-23 DIAGNOSIS — Z94.0 KIDNEY TRANSPLANT STATUS: ICD-10-CM

## 2022-09-23 DIAGNOSIS — Z89.422 ACQUIRED ABSENCE OF OTHER LEFT TOE(S): ICD-10-CM

## 2022-09-23 DIAGNOSIS — L97.412 NON-PRESSURE CHRONIC ULCER OF RIGHT HEEL AND MIDFOOT WITH FAT LAYER EXPOSED: ICD-10-CM

## 2022-09-23 DIAGNOSIS — Z79.899 OTHER LONG TERM (CURRENT) DRUG THERAPY: ICD-10-CM

## 2022-09-23 DIAGNOSIS — G47.33 OBSTRUCTIVE SLEEP APNEA (ADULT) (PEDIATRIC): ICD-10-CM

## 2022-09-23 DIAGNOSIS — Z85.828 PERSONAL HISTORY OF OTHER MALIGNANT NEOPLASM OF SKIN: ICD-10-CM

## 2022-09-23 DIAGNOSIS — Z80.8 FAMILY HISTORY OF MALIGNANT NEOPLASM OF OTHER ORGANS OR SYSTEMS: ICD-10-CM

## 2022-09-23 DIAGNOSIS — E66.01 MORBID (SEVERE) OBESITY DUE TO EXCESS CALORIES: ICD-10-CM

## 2022-09-29 ENCOUNTER — OUTPATIENT (OUTPATIENT)
Dept: OUTPATIENT SERVICES | Facility: HOSPITAL | Age: 57
LOS: 1 days | Discharge: HOME | End: 2022-09-29
Payer: MEDICARE

## 2022-09-29 ENCOUNTER — APPOINTMENT (OUTPATIENT)
Dept: WOUND CARE | Facility: HOSPITAL | Age: 57
End: 2022-09-29

## 2022-09-29 VITALS
HEIGHT: 73 IN | SYSTOLIC BLOOD PRESSURE: 143 MMHG | WEIGHT: 315 LBS | BODY MASS INDEX: 41.75 KG/M2 | HEART RATE: 56 BPM | RESPIRATION RATE: 20 BRPM | DIASTOLIC BLOOD PRESSURE: 73 MMHG | OXYGEN SATURATION: 94 % | TEMPERATURE: 97.7 F

## 2022-09-29 DIAGNOSIS — Z89.421 ACQUIRED ABSENCE OF OTHER RIGHT TOE(S): Chronic | ICD-10-CM

## 2022-09-29 DIAGNOSIS — E11.621 TYPE 2 DIABETES MELLITUS WITH FOOT ULCER: ICD-10-CM

## 2022-09-29 DIAGNOSIS — N18.6 END STAGE RENAL DISEASE: Chronic | ICD-10-CM

## 2022-09-29 DIAGNOSIS — Z94.0 KIDNEY TRANSPLANT STATUS: Chronic | ICD-10-CM

## 2022-09-29 PROCEDURE — G0463: CPT

## 2022-09-29 PROCEDURE — 99213 OFFICE O/P EST LOW 20 MIN: CPT

## 2022-10-01 NOTE — PLAN
[FreeTextEntry1] : Patient examined and evaluated at this time.\par Continue local wound care and offloading.\par Advised regarding surgical excision of right 5th proximal phalanx base if wound does not resolve for biomechanical etiology.\par All questions answered to satisfaction and patient verbalized understanding.\par Spent 20 minutes for patient care and medical decision making.\par Patient to follow up in 1 week.

## 2022-10-01 NOTE — HISTORY OF PRESENT ILLNESS
[FreeTextEntry1] : Pt seen for DFU plantar base 5th metatarsal right foot down to skin, subcutaneous tissue, fat. Pt also seen for right 5th metatarsal head lateral wound down to skin, subcutaneous tissue, and fat.

## 2022-10-01 NOTE — REVIEW OF SYSTEMS
[Fever] : no fever [Chills] : no chills [Eye Pain] : no eye pain [Loss Of Hearing] : no hearing loss [Shortness Of Breath] : no shortness of breath [Abdominal Pain] : no abdominal pain [Vomiting] : no vomiting [Skin Lesions] : no skin lesions [Skin Wound] : no skin wound [Anxiety] : no anxiety [Easy Bleeding] : no tendency for easy bleeding [FreeTextEntry5] : HTN,HLD [FreeTextEntry7] : 40.9 BMI , morbid obesity  [FreeTextEntry8] : Kidney Transplant  [FreeTextEntry9] : Associated Diabetic Charcot foot  [de-identified] : plantar right foot midfoot , lateral  DFU 2  , large area of peripheral callus build up , the ulcer is clean and granular s/p surgical intervention  [de-identified] : IDDM with neuropathy  [de-identified] : MARILUZ

## 2022-10-01 NOTE — PHYSICAL EXAM
[2 x 2] : 2 x 2  [4 x 4] : 4 x 4  [Abdominal Pad] : Abdominal Pad [0] : left 0 [1+] : left 1+ [Varicose Veins Of Lower Extremities] : not present [] : not present [Purpura] : no purpura  [Petechiae] : no petechiae [Skin Ulcer] : ulcer [Skin Induration] : no induration [Alert] : alert [Oriented to Person] : oriented to person [Oriented to Place] : oriented to place [Oriented to Time] : oriented to time [Calm] : calm [de-identified] : adult WM, NAD, alert, Ox 3. [de-identified] : HTN, HLD [de-identified] : Diabetic Charcot right foot deformity [de-identified] : right foot plantar ulcer 5th metatarsal healing ulcer down to skin, subcutaneous tissue, and fat. ulcer lateral head of 5th metatarsal down to skin, subcutaneous tissue, fat. [de-identified] : Diabetic neuropathy  [FreeTextEntry1] : Foot 5th Lateral Digit [FreeTextEntry2] : 0.5 [FreeTextEntry3] : 0.9 [FreeTextEntry4] : 0.2 [de-identified] : serosanguineous [de-identified] : Callus- removed by DPM [de-identified] : Nai  [de-identified] : Mechanically Cleansed with Sterile Gauze & Normal Saline  [FreeTextEntry7] : Plantar Foot  [FreeTextEntry8] : 0.7 [FreeTextEntry9] : 1.9 [de-identified] : 0.2 [de-identified] : Serosanguineous  [de-identified] : Callus- removed by DPM  [de-identified] : Nai  [de-identified] : Mechanically Cleansed with Sterile Gauze & Normal Saline \par Kerlix  [TWNoteComboBox1] : Right [TWNoteComboBox4] : Small [TWNoteComboBox6] : Pressure [de-identified] : other [de-identified] : Mild [de-identified] : 100% [de-identified] : 3x Weekly [TWNoteComboBox9] : Right [de-identified] : Small [de-identified] : other [de-identified] : None [de-identified] : None [de-identified] : 100% [de-identified] : No [de-identified] : 3x Weekly

## 2022-10-01 NOTE — ASSESSMENT
[Verbal] : Verbal [Written] : Written [Demo] : Demo [Patient] : Patient [Good - alert, interested, motivated] : Good - alert, interested, motivated [Verbalizes knowledge/Understanding] : Verbalizes knowledge/understanding [Dressing changes] : dressing changes [Foot Care] : foot care [Skin Care] : skin care [Pressure relief] : pressure relief [Signs and symptoms of infection] : sign and symptoms of infection [How and When to Call] : how and when to call [Patient responsibility to plan of care] : patient responsibility to plan of care [Glycemic Control] : glycemic control [Stable] : stable [Home] : Home [Ambulatory] : Ambulatory [Not Applicable - Long Term Care/Home Health Agency] : Long Term Care/Home Health Agency: Not Applicable [Off-loading] : off-loading [] : Yes [FreeTextEntry2] : Localized Wound Care \par Infection Prevention \par Offloading/Pressure Relief \par Collagen Matrix Therapy  [FreeTextEntry4] : Pt to F/U to [facility] in 1 Week

## 2022-10-02 DIAGNOSIS — Z79.82 LONG TERM (CURRENT) USE OF ASPIRIN: ICD-10-CM

## 2022-10-02 DIAGNOSIS — Z89.422 ACQUIRED ABSENCE OF OTHER LEFT TOE(S): ICD-10-CM

## 2022-10-02 DIAGNOSIS — Z89.421 ACQUIRED ABSENCE OF OTHER RIGHT TOE(S): ICD-10-CM

## 2022-10-02 DIAGNOSIS — E11.621 TYPE 2 DIABETES MELLITUS WITH FOOT ULCER: ICD-10-CM

## 2022-10-02 DIAGNOSIS — Z94.0 KIDNEY TRANSPLANT STATUS: ICD-10-CM

## 2022-10-02 DIAGNOSIS — E11.610 TYPE 2 DIABETES MELLITUS WITH DIABETIC NEUROPATHIC ARTHROPATHY: ICD-10-CM

## 2022-10-02 DIAGNOSIS — Z80.3 FAMILY HISTORY OF MALIGNANT NEOPLASM OF BREAST: ICD-10-CM

## 2022-10-02 DIAGNOSIS — Z83.3 FAMILY HISTORY OF DIABETES MELLITUS: ICD-10-CM

## 2022-10-02 DIAGNOSIS — Z80.8 FAMILY HISTORY OF MALIGNANT NEOPLASM OF OTHER ORGANS OR SYSTEMS: ICD-10-CM

## 2022-10-02 DIAGNOSIS — L97.412 NON-PRESSURE CHRONIC ULCER OF RIGHT HEEL AND MIDFOOT WITH FAT LAYER EXPOSED: ICD-10-CM

## 2022-10-02 DIAGNOSIS — Z87.81 PERSONAL HISTORY OF (HEALED) TRAUMATIC FRACTURE: ICD-10-CM

## 2022-10-02 DIAGNOSIS — Z79.899 OTHER LONG TERM (CURRENT) DRUG THERAPY: ICD-10-CM

## 2022-10-02 DIAGNOSIS — Z86.16 PERSONAL HISTORY OF COVID-19: ICD-10-CM

## 2022-10-02 DIAGNOSIS — Z79.4 LONG TERM (CURRENT) USE OF INSULIN: ICD-10-CM

## 2022-10-02 DIAGNOSIS — L97.512 NON-PRESSURE CHRONIC ULCER OF OTHER PART OF RIGHT FOOT WITH FAT LAYER EXPOSED: ICD-10-CM

## 2022-10-02 DIAGNOSIS — L84 CORNS AND CALLOSITIES: ICD-10-CM

## 2022-10-02 DIAGNOSIS — Z85.828 PERSONAL HISTORY OF OTHER MALIGNANT NEOPLASM OF SKIN: ICD-10-CM

## 2022-10-02 DIAGNOSIS — G47.33 OBSTRUCTIVE SLEEP APNEA (ADULT) (PEDIATRIC): ICD-10-CM

## 2022-10-02 DIAGNOSIS — Z86.718 PERSONAL HISTORY OF OTHER VENOUS THROMBOSIS AND EMBOLISM: ICD-10-CM

## 2022-10-02 DIAGNOSIS — E66.01 MORBID (SEVERE) OBESITY DUE TO EXCESS CALORIES: ICD-10-CM

## 2022-10-02 DIAGNOSIS — Z86.73 PERSONAL HISTORY OF TRANSIENT ISCHEMIC ATTACK (TIA), AND CEREBRAL INFARCTION WITHOUT RESIDUAL DEFICITS: ICD-10-CM

## 2022-10-02 DIAGNOSIS — E11.51 TYPE 2 DIABETES MELLITUS WITH DIABETIC PERIPHERAL ANGIOPATHY WITHOUT GANGRENE: ICD-10-CM

## 2022-10-02 DIAGNOSIS — E78.5 HYPERLIPIDEMIA, UNSPECIFIED: ICD-10-CM

## 2022-10-03 ENCOUNTER — RESULT REVIEW (OUTPATIENT)
Age: 57
End: 2022-10-03

## 2022-10-03 ENCOUNTER — APPOINTMENT (OUTPATIENT)
Dept: RADIOLOGY | Facility: CLINIC | Age: 57
End: 2022-10-03

## 2022-10-03 ENCOUNTER — APPOINTMENT (OUTPATIENT)
Dept: ULTRASOUND IMAGING | Facility: CLINIC | Age: 57
End: 2022-10-03

## 2022-10-03 PROCEDURE — 71046 X-RAY EXAM CHEST 2 VIEWS: CPT

## 2022-10-03 PROCEDURE — 76604 US EXAM CHEST: CPT

## 2022-10-03 NOTE — PROCEDURE NOTE - NSSITEPREP_SKIN_A_CORE
chlorhexidine/Adherence to aseptic technique: hand hygiene prior to donning barriers (gown, gloves), don cap and mask, sterile drape over patient Gabapentin Counseling: I discussed with the patient the risks of gabapentin including but not limited to dizziness, somnolence, fatigue and ataxia.

## 2022-10-04 NOTE — PATIENT PROFILE ADULT - FALL HARM RISK - FACTORS
Patient with history of ESRD on HD presented to the hospital after missing numerous HD sessions as outpatient. Last outpatient HD was on 9/19. In the ED the patient did not have capacity to make medical decisions so consent was obtained through 2 physicians. Pt. received maintenance yesterday that he tolerated well. CLinically stable, plan for next session of HD tomorrow. Will attempt to use LUE AVF with tomorrow's HD session. Monitor labs and urine output. Avoid NSAIDs, ACEI/ARBS, RCA and nephrotoxins. Dose medications as per eGFR. Weakness/Other

## 2022-10-06 ENCOUNTER — APPOINTMENT (OUTPATIENT)
Dept: WOUND CARE | Facility: HOSPITAL | Age: 57
End: 2022-10-06

## 2022-10-06 ENCOUNTER — OUTPATIENT (OUTPATIENT)
Dept: OUTPATIENT SERVICES | Facility: HOSPITAL | Age: 57
LOS: 1 days | Discharge: HOME | End: 2022-10-06
Payer: MEDICARE

## 2022-10-06 DIAGNOSIS — E11.621 TYPE 2 DIABETES MELLITUS WITH FOOT ULCER: ICD-10-CM

## 2022-10-06 DIAGNOSIS — Z89.421 ACQUIRED ABSENCE OF OTHER RIGHT TOE(S): Chronic | ICD-10-CM

## 2022-10-06 DIAGNOSIS — Z94.0 KIDNEY TRANSPLANT STATUS: Chronic | ICD-10-CM

## 2022-10-06 DIAGNOSIS — N18.6 END STAGE RENAL DISEASE: Chronic | ICD-10-CM

## 2022-10-06 PROCEDURE — 99213 OFFICE O/P EST LOW 20 MIN: CPT

## 2022-10-06 PROCEDURE — G0463: CPT

## 2022-10-06 NOTE — REVIEW OF SYSTEMS
[Fever] : no fever [Chills] : no chills [Eye Pain] : no eye pain [Loss Of Hearing] : no hearing loss [Shortness Of Breath] : no shortness of breath [Abdominal Pain] : no abdominal pain [Vomiting] : no vomiting [Skin Lesions] : no skin lesions [Skin Wound] : no skin wound [Anxiety] : no anxiety [Easy Bleeding] : no tendency for easy bleeding [FreeTextEntry5] : HTN,HLD [FreeTextEntry7] : 40.9 BMI , morbid obesity  [FreeTextEntry8] : Kidney Transplant  [FreeTextEntry9] : Associated Diabetic Charcot foot  [de-identified] : plantar right foot midfoot , lateral  DFU 2  , large area of peripheral callus build up , the ulcer is clean and granular s/p surgical intervention  [de-identified] : IDDM with neuropathy  [de-identified] : MARILUZ

## 2022-10-06 NOTE — PHYSICAL EXAM
[4 x 4] : 4 x 4  [Abdominal Pad] : Abdominal Pad [0] : left 0 [1+] : left 1+ [Varicose Veins Of Lower Extremities] : not present [] : not present [Purpura] : no purpura  [Petechiae] : no petechiae [Skin Ulcer] : ulcer [Skin Induration] : no induration [Alert] : alert [Oriented to Person] : oriented to person [Oriented to Place] : oriented to place [Oriented to Time] : oriented to time [Calm] : calm [de-identified] : adult WM, NAD, alert, Ox 3. [de-identified] : HTN, HLD [de-identified] : Diabetic Charcot right foot deformity [de-identified] : right foot plantar ulcer 5th metatarsal healing ulcer down to skin, subcutaneous tissue, and fat. ulcer lateral head of 5th metatarsal down to skin, subcutaneous tissue, fat. [de-identified] : Diabetic neuropathy  [FreeTextEntry1] : Foot 5th Lateral Digit [FreeTextEntry2] : 0.5 [FreeTextEntry3] : 0.8 [FreeTextEntry4] : 0.2 [de-identified] : serosanguineous [de-identified] : Callus Removed by DPM [de-identified] : None [de-identified] : silver alginate [de-identified] : Mechanically cleansed with 0.9%normal saline and sterile gauze\par  [FreeTextEntry7] : Plantar Foot [FreeTextEntry8] : 0.6 [FreeTextEntry9] : 1.8 [de-identified] : 0.1 [de-identified] : serosanguineous [de-identified] : Callus removed by DPM [de-identified] : Silver Alginate [de-identified] : Mechanically cleansed with 0.9%normal saline and sterile gauze\par  [TWNoteComboBox1] : Right [TWNoteComboBox4] : Small [TWNoteComboBox5] : No [TWNoteComboBox6] : Pressure [de-identified] : No [de-identified] : other [de-identified] : None [de-identified] : None [de-identified] : 100% [de-identified] : No [de-identified] : 3x Weekly [de-identified] : Primary Dressing [TWNoteComboBox9] : Right [de-identified] : Small [de-identified] : No [de-identified] : Pressure [de-identified] : No [de-identified] : other [de-identified] : None [de-identified] : None [de-identified] : 100% [de-identified] : 3x Weekly [de-identified] : Primary Dressing

## 2022-10-06 NOTE — ASSESSMENT
[Verbal] : Verbal [Written] : Written [Patient] : Patient [Good - alert, interested, motivated] : Good - alert, interested, motivated [Demonstrates independently] : demonstrates independently [Dressing changes] : dressing changes [Foot Care] : foot care [Skin Care] : skin care [Pressure relief] : pressure relief [Signs and symptoms of infection] : sign and symptoms of infection [How and When to Call] : how and when to call [Off-loading] : off-loading [Patient responsibility to plan of care] : patient responsibility to plan of care [Stable] : stable [Home] : Home [Ambulatory] : Ambulatory [] : No [FreeTextEntry2] : Localized wound care\par Infection prevention\par Offloading/ pressure relief\par restoring skin integrity  [FreeTextEntry4] : Callus shaved by DPM\par F/U 1 week for assessment\par

## 2022-10-06 NOTE — HISTORY OF PRESENT ILLNESS
[FreeTextEntry1] : DFU plantar base 5th metatarsal and the lateral head of the 5th metatarsal , both smaller , clean - soi

## 2022-10-06 NOTE — PLAN
[FreeTextEntry1] : continue wound care and off loading , # 15 blade used to trim callus around the periphery of each ulcer   Spent 20 minutes for patient care and medical decision making.\par \par

## 2022-10-07 DIAGNOSIS — E78.5 HYPERLIPIDEMIA, UNSPECIFIED: ICD-10-CM

## 2022-10-07 DIAGNOSIS — Z80.8 FAMILY HISTORY OF MALIGNANT NEOPLASM OF OTHER ORGANS OR SYSTEMS: ICD-10-CM

## 2022-10-07 DIAGNOSIS — Z86.718 PERSONAL HISTORY OF OTHER VENOUS THROMBOSIS AND EMBOLISM: ICD-10-CM

## 2022-10-07 DIAGNOSIS — L97.512 NON-PRESSURE CHRONIC ULCER OF OTHER PART OF RIGHT FOOT WITH FAT LAYER EXPOSED: ICD-10-CM

## 2022-10-07 DIAGNOSIS — L84 CORNS AND CALLOSITIES: ICD-10-CM

## 2022-10-07 DIAGNOSIS — Z89.422 ACQUIRED ABSENCE OF OTHER LEFT TOE(S): ICD-10-CM

## 2022-10-07 DIAGNOSIS — E11.621 TYPE 2 DIABETES MELLITUS WITH FOOT ULCER: ICD-10-CM

## 2022-10-07 DIAGNOSIS — L97.412 NON-PRESSURE CHRONIC ULCER OF RIGHT HEEL AND MIDFOOT WITH FAT LAYER EXPOSED: ICD-10-CM

## 2022-10-07 DIAGNOSIS — Z87.81 PERSONAL HISTORY OF (HEALED) TRAUMATIC FRACTURE: ICD-10-CM

## 2022-10-07 DIAGNOSIS — Z94.0 KIDNEY TRANSPLANT STATUS: ICD-10-CM

## 2022-10-07 DIAGNOSIS — Z80.3 FAMILY HISTORY OF MALIGNANT NEOPLASM OF BREAST: ICD-10-CM

## 2022-10-07 DIAGNOSIS — Z86.16 PERSONAL HISTORY OF COVID-19: ICD-10-CM

## 2022-10-07 DIAGNOSIS — E11.610 TYPE 2 DIABETES MELLITUS WITH DIABETIC NEUROPATHIC ARTHROPATHY: ICD-10-CM

## 2022-10-07 DIAGNOSIS — Z83.3 FAMILY HISTORY OF DIABETES MELLITUS: ICD-10-CM

## 2022-10-07 DIAGNOSIS — Z79.4 LONG TERM (CURRENT) USE OF INSULIN: ICD-10-CM

## 2022-10-07 DIAGNOSIS — Z85.828 PERSONAL HISTORY OF OTHER MALIGNANT NEOPLASM OF SKIN: ICD-10-CM

## 2022-10-07 DIAGNOSIS — E11.51 TYPE 2 DIABETES MELLITUS WITH DIABETIC PERIPHERAL ANGIOPATHY WITHOUT GANGRENE: ICD-10-CM

## 2022-10-07 DIAGNOSIS — Z79.899 OTHER LONG TERM (CURRENT) DRUG THERAPY: ICD-10-CM

## 2022-10-07 DIAGNOSIS — Z86.73 PERSONAL HISTORY OF TRANSIENT ISCHEMIC ATTACK (TIA), AND CEREBRAL INFARCTION WITHOUT RESIDUAL DEFICITS: ICD-10-CM

## 2022-10-07 DIAGNOSIS — G47.33 OBSTRUCTIVE SLEEP APNEA (ADULT) (PEDIATRIC): ICD-10-CM

## 2022-10-07 DIAGNOSIS — Z89.421 ACQUIRED ABSENCE OF OTHER RIGHT TOE(S): ICD-10-CM

## 2022-10-07 DIAGNOSIS — Z79.82 LONG TERM (CURRENT) USE OF ASPIRIN: ICD-10-CM

## 2022-10-12 ENCOUNTER — NON-APPOINTMENT (OUTPATIENT)
Age: 57
End: 2022-10-12

## 2022-10-13 ENCOUNTER — OUTPATIENT (OUTPATIENT)
Dept: OUTPATIENT SERVICES | Facility: HOSPITAL | Age: 57
LOS: 1 days | Discharge: HOME | End: 2022-10-13
Payer: MEDICARE

## 2022-10-13 ENCOUNTER — APPOINTMENT (OUTPATIENT)
Dept: WOUND CARE | Facility: HOSPITAL | Age: 57
End: 2022-10-13

## 2022-10-13 VITALS
HEART RATE: 62 BPM | TEMPERATURE: 98.8 F | RESPIRATION RATE: 20 BRPM | OXYGEN SATURATION: 94 % | HEIGHT: 73 IN | WEIGHT: 315 LBS | BODY MASS INDEX: 41.75 KG/M2 | DIASTOLIC BLOOD PRESSURE: 73 MMHG | SYSTOLIC BLOOD PRESSURE: 120 MMHG

## 2022-10-13 DIAGNOSIS — Z86.16 PERSONAL HISTORY OF COVID-19: ICD-10-CM

## 2022-10-13 DIAGNOSIS — N18.6 END STAGE RENAL DISEASE: Chronic | ICD-10-CM

## 2022-10-13 DIAGNOSIS — Z79.4 LONG TERM (CURRENT) USE OF INSULIN: ICD-10-CM

## 2022-10-13 DIAGNOSIS — Z94.0 KIDNEY TRANSPLANT STATUS: ICD-10-CM

## 2022-10-13 DIAGNOSIS — E11.621 TYPE 2 DIABETES MELLITUS WITH FOOT ULCER: ICD-10-CM

## 2022-10-13 DIAGNOSIS — Z79.82 LONG TERM (CURRENT) USE OF ASPIRIN: ICD-10-CM

## 2022-10-13 DIAGNOSIS — G47.33 OBSTRUCTIVE SLEEP APNEA (ADULT) (PEDIATRIC): ICD-10-CM

## 2022-10-13 DIAGNOSIS — L84 CORNS AND CALLOSITIES: ICD-10-CM

## 2022-10-13 DIAGNOSIS — L97.512 NON-PRESSURE CHRONIC ULCER OF OTHER PART OF RIGHT FOOT WITH FAT LAYER EXPOSED: ICD-10-CM

## 2022-10-13 DIAGNOSIS — Z89.422 ACQUIRED ABSENCE OF OTHER LEFT TOE(S): ICD-10-CM

## 2022-10-13 DIAGNOSIS — Z89.421 ACQUIRED ABSENCE OF OTHER RIGHT TOE(S): Chronic | ICD-10-CM

## 2022-10-13 DIAGNOSIS — Z85.828 PERSONAL HISTORY OF OTHER MALIGNANT NEOPLASM OF SKIN: ICD-10-CM

## 2022-10-13 DIAGNOSIS — Z86.718 PERSONAL HISTORY OF OTHER VENOUS THROMBOSIS AND EMBOLISM: ICD-10-CM

## 2022-10-13 DIAGNOSIS — E11.610 TYPE 2 DIABETES MELLITUS WITH DIABETIC NEUROPATHIC ARTHROPATHY: ICD-10-CM

## 2022-10-13 DIAGNOSIS — Z94.0 KIDNEY TRANSPLANT STATUS: Chronic | ICD-10-CM

## 2022-10-13 DIAGNOSIS — Z80.3 FAMILY HISTORY OF MALIGNANT NEOPLASM OF BREAST: ICD-10-CM

## 2022-10-13 DIAGNOSIS — E78.5 HYPERLIPIDEMIA, UNSPECIFIED: ICD-10-CM

## 2022-10-13 DIAGNOSIS — Z86.73 PERSONAL HISTORY OF TRANSIENT ISCHEMIC ATTACK (TIA), AND CEREBRAL INFARCTION WITHOUT RESIDUAL DEFICITS: ICD-10-CM

## 2022-10-13 DIAGNOSIS — Z83.3 FAMILY HISTORY OF DIABETES MELLITUS: ICD-10-CM

## 2022-10-13 DIAGNOSIS — Z89.421 ACQUIRED ABSENCE OF OTHER RIGHT TOE(S): ICD-10-CM

## 2022-10-13 DIAGNOSIS — L97.412 NON-PRESSURE CHRONIC ULCER OF RIGHT HEEL AND MIDFOOT WITH FAT LAYER EXPOSED: ICD-10-CM

## 2022-10-13 DIAGNOSIS — Z80.8 FAMILY HISTORY OF MALIGNANT NEOPLASM OF OTHER ORGANS OR SYSTEMS: ICD-10-CM

## 2022-10-13 DIAGNOSIS — Z79.899 OTHER LONG TERM (CURRENT) DRUG THERAPY: ICD-10-CM

## 2022-10-13 DIAGNOSIS — E11.51 TYPE 2 DIABETES MELLITUS WITH DIABETIC PERIPHERAL ANGIOPATHY WITHOUT GANGRENE: ICD-10-CM

## 2022-10-13 DIAGNOSIS — Z87.81 PERSONAL HISTORY OF (HEALED) TRAUMATIC FRACTURE: ICD-10-CM

## 2022-10-13 PROCEDURE — G0463: CPT

## 2022-10-13 PROCEDURE — 99213 OFFICE O/P EST LOW 20 MIN: CPT

## 2022-10-13 NOTE — REVIEW OF SYSTEMS
[Fever] : no fever [Chills] : no chills [Eye Pain] : no eye pain [Loss Of Hearing] : no hearing loss [Shortness Of Breath] : no shortness of breath [Abdominal Pain] : no abdominal pain [Vomiting] : no vomiting [Skin Lesions] : no skin lesions [Skin Wound] : no skin wound [Anxiety] : no anxiety [Easy Bleeding] : no tendency for easy bleeding [FreeTextEntry5] : HTN,HLD [FreeTextEntry7] : 40.9 BMI , morbid obesity  [FreeTextEntry8] : Kidney Transplant  [FreeTextEntry9] : Associated Diabetic Charcot foot  [de-identified] : plantar right foot midfoot , lateral  DFU 2  , large area of peripheral callus build up , the ulcer is clean and granular s/p surgical intervention  [de-identified] : IDDM with neuropathy  [de-identified] : MARILUZ

## 2022-10-13 NOTE — PHYSICAL EXAM
[0] : left 0 [1+] : left 1+ [Varicose Veins Of Lower Extremities] : not present [] : not present [Purpura] : no purpura  [Petechiae] : no petechiae [Skin Ulcer] : ulcer [Skin Induration] : no induration [Alert] : alert [Oriented to Person] : oriented to person [Oriented to Place] : oriented to place [Oriented to Time] : oriented to time [Calm] : calm [de-identified] : adult WM, NAD, alert, Ox 3. [de-identified] : HTN, HLD [de-identified] : Diabetic Charcot right foot deformity [de-identified] : right foot plantar ulcer 5th metatarsal healing ulcer down to skin, subcutaneous tissue, and fat. ulcer lateral head of 5th metatarsal down to skin, subcutaneous tissue, fat. [de-identified] : Diabetic neuropathy  [FreeTextEntry1] : Right Foot Lateral 5th Met [FreeTextEntry2] : 0.5 [FreeTextEntry3] : 1.0 [FreeTextEntry4] : 0. [de-identified] : Small Serosanguineous [de-identified] : Intact/  callus- callus shaved by Dr Catherine [de-identified] : Ace Bandages for bandage support applied at pt request & Dr Florin marquez [de-identified] : Nai/ Dry Dressing & Kerlix [de-identified] : Mechanically cleansed with 0.9%normal saline and sterile gauze\par  [FreeTextEntry7] : Right Plantar Foot [FreeTextEntry8] : 0.5 [FreeTextEntry9] : 1.6 [de-identified] : 0.1 [de-identified] : Small Serosanguineous [de-identified] : Intact/ callus- callus shaved by Dr Catherine [de-identified] : Ace Bandages for bandage support applied at pt request & Dr Florin marquez [de-identified] : Nai/ Dry Dressing & Kerlix [de-identified] : Mechanically cleansed with 0.9%normal saline and sterile gauze\par  [TWNoteComboBox1] : False [TWNoteComboBox4] : False [TWNoteComboBox5] : False [TWNoteComboBox6] : False [de-identified] : False [de-identified] : False [de-identified] : None [de-identified] : None [de-identified] : 100% [de-identified] : No [de-identified] : False [de-identified] : False [TWNoteComboBox9] : False [de-identified] : False [de-identified] : False [de-identified] : False [de-identified] : False [de-identified] : False [de-identified] : None [de-identified] : None [de-identified] : 100% [de-identified] : No [de-identified] : False [de-identified] : False

## 2022-10-13 NOTE — ASSESSMENT
[Verbal] : Verbal [Demo] : Demo [Good - alert, interested, motivated] : Good - alert, interested, motivated [Patient] : Patient [Dressing changes] : dressing changes [Foot Care] : foot care [Skin Care] : skin care [Pressure relief] : pressure relief [Signs and symptoms of infection] : sign and symptoms of infection [How and When to Call] : how and when to call [Off-loading] : off-loading [Patient responsibility to plan of care] : patient responsibility to plan of care [Home] : Home [Stable] : stable [Ambulatory] : Ambulatory [] : No [FreeTextEntry2] : Alteration in skin integrity- promote optimal skin integrity\par  [FreeTextEntry3] : unchanged [FreeTextEntry4] : Dr Catherine/ Photos taken\par F/U to [facility] in 1 week

## 2022-10-17 ENCOUNTER — APPOINTMENT (OUTPATIENT)
Dept: THORACIC SURGERY | Facility: CLINIC | Age: 57
End: 2022-10-17

## 2022-10-17 VITALS
HEART RATE: 69 BPM | SYSTOLIC BLOOD PRESSURE: 158 MMHG | RESPIRATION RATE: 16 BRPM | DIASTOLIC BLOOD PRESSURE: 76 MMHG | WEIGHT: 315 LBS | HEIGHT: 73 IN | BODY MASS INDEX: 41.75 KG/M2 | OXYGEN SATURATION: 89 %

## 2022-10-17 PROCEDURE — 99205 OFFICE O/P NEW HI 60 MIN: CPT

## 2022-10-17 RX ORDER — INSULIN LISPRO 100 [IU]/ML
100 INJECTION, SOLUTION INTRAVENOUS; SUBCUTANEOUS
Refills: 0 | Status: DISCONTINUED | COMMUNITY
Start: 2018-10-15 | End: 2022-10-17

## 2022-10-17 RX ORDER — ASPIRIN 325 MG/1
325 TABLET, FILM COATED ORAL DAILY
Refills: 0 | Status: DISCONTINUED | COMMUNITY
End: 2022-10-17

## 2022-10-17 RX ORDER — ERGOCALCIFEROL 1.25 MG/1
1.25 MG CAPSULE ORAL WEEKLY
Qty: 13 | Refills: 4 | Status: DISCONTINUED | COMMUNITY
Start: 2021-12-20 | End: 2022-10-17

## 2022-10-18 ENCOUNTER — NON-APPOINTMENT (OUTPATIENT)
Age: 57
End: 2022-10-18

## 2022-10-19 ENCOUNTER — NON-APPOINTMENT (OUTPATIENT)
Age: 57
End: 2022-10-19

## 2022-10-19 ENCOUNTER — APPOINTMENT (OUTPATIENT)
Dept: CARDIOLOGY | Facility: CLINIC | Age: 57
End: 2022-10-19

## 2022-10-19 VITALS
SYSTOLIC BLOOD PRESSURE: 187 MMHG | BODY MASS INDEX: 41.75 KG/M2 | HEART RATE: 59 BPM | DIASTOLIC BLOOD PRESSURE: 91 MMHG | OXYGEN SATURATION: 93 % | HEIGHT: 73 IN | WEIGHT: 315 LBS

## 2022-10-19 PROCEDURE — 99214 OFFICE O/P EST MOD 30 MIN: CPT

## 2022-10-19 PROCEDURE — 93000 ELECTROCARDIOGRAM COMPLETE: CPT

## 2022-10-19 NOTE — ASSESSMENT
[FreeTextEntry1] : \par \par Mr. 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 He was receiving hyperbaric therapy to right LE post surgery, prior workup required chest xray where he was found to have a right pleural effusion s/p thoracentesis in 06/2022 with 600 ml of fluid was removed. He presented to ED on 08/12/2022 for LOC and recurrent Right pleural effusion, referred by Charly Triana (Pulm). \par \par CT chest on 08/12/2022:\par - Large right pleural effusion and atelectasis of right lower lobe.\par \par s/p right thoracentesis on 08/15/2022, drained 1 L clear yellow fluid. Path revealed benign findings. Lymphocytes, histiocytes and reactive mesothelial cells.\par \par CXR on 10/03/2022:\par - Persistent moderate right pleural effusion and associated atelectasis.\par \par US chest on 10/03/2022:\par - There is normal left diaphragmatic motion. A small left pleural effusion.\par - There is mild right pleural effusion. There is no diaphragmatic motion, consistent with diaphragmatic paresis / paralysis.\par \par I have independently reviewed the patient's medical records and diagnostic images at time of this office consultation and have made the following recommendation:\par 1. Recurrent pleural effusion-I recommend patient undergo Right VATS possible thoracotomy, possible talc pleurodesis, possible Pleurx, possible decortication with Cardiac and Neph/Transplant clearance prior. Risks, benefits, and alternatives explained to patient, all questions answered, patient agreed to proceed with surgery.\par \par \par I personally performed the services described in the documentation, reviewed the documentation recorded by the scribe in my presence and it accurately and completely records my words and actions.\par \par I, Tami La Mendola, AGACNP, am scribing for and in the presence of JOSE ALFREDO Souza, the following sections HISTORY OF PRESENT ILLNESS, PAST MEDICAL/FAMILY/SOCIAL HISTORY; REVIEW OF SYSTEMS; VITAL SIGNS; PHYSICAL EXAM; DISPOSITION.\par  \par

## 2022-10-19 NOTE — PHYSICAL EXAM
[Restricted in physically strenuous activity but ambulatory and able to carry out work of a light or sedentary nature] : Status 1- Restricted in physically strenuous activity but ambulatory and able to carry out work of a light or sedentary nature, e.g., light house work, office work [General Appearance - In No Acute Distress] : in no acute distress [PERRL With Normal Accommodation] : pupils were equal in size, round, and reactive to light [Outer Ear] : the ears and nose were normal in appearance [] : no respiratory distress [Neck Appearance] : the appearance of the neck was normal [Respiration, Rhythm And Depth] : normal respiratory rhythm and effort [Exaggerated Use Of Accessory Muscles For Inspiration] : no accessory muscle use [Auscultation Breath Sounds / Voice Sounds] : lungs were clear to auscultation bilaterally [Heart Sounds] : normal S1 and S2 [Examination Of The Chest] : the chest was normal in appearance [2+] : right 2+ [Breast Appearance] : normal in appearance [Abdomen Soft] : soft [Cervical Lymph Nodes Enlarged Posterior Bilaterally] : posterior cervical [Cervical Lymph Nodes Enlarged Anterior Bilaterally] : anterior cervical [Musculoskeletal - Swelling] : no joint swelling seen [Skin Turgor] : normal skin turgor [No Focal Deficits] : no focal deficits [Oriented To Time, Place, And Person] : oriented to person, place, and time [FreeTextEntry1] : Deferred

## 2022-10-19 NOTE — HISTORY OF PRESENT ILLNESS
[FreeTextEntry1] : Mr. 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 He was receiving hyperbaric therapy to right LE post surgery, prior workup required chest xray where he was found to have a right pleural effusion s/p thoracentesis in 06/2022 with 600 ml of fluid was removed. He presented to ED on 08/12/2022 for LOC and recurrent Right pleural effusion, referred by Charly Triana (Pulm). \par \par CT chest on 08/12/2022:\par - Large right pleural effusion and atelectasis of right lower lobe.\par \par s/p right thoracentesis on 08/15/2022, drained 1 L clear yellow fluid. Path revealed benign findings. Lymphocytes, histiocytes and reactive mesothelial cells.\par \par CXR on 10/03/2022:\par - Persistent moderate right pleural effusion and associated atelectasis.\par \par US chest on 10/03/2022:\par - There is normal left diaphragmatic motion. A small left pleural effusion.\par - There is mild right pleural effusion. There is no diaphragmatic motion, consistent with diaphragmatic paresis / paralysis.\par \par Patient is here today for CT surgery consultation. He endorses SOB on exertion and productive cough with mild amount of sputum. Patient denies chest pain, fever, chills, unintentional weight loss, hemoptysis.

## 2022-10-19 NOTE — ADDENDUM
[FreeTextEntry1] : PT ARRIVED A&OX3 \par ALL VITALS WITHIN PARAMETERS FOR HBOT\par NO DRAINAGE NOTED ON DRESSING PRE HBOT \par PT PRE-DIVE CHECKLIST SIGNED AND WITNESSED BY CHT\par PT DESCENDED TO 2.4 DEEPAK @ 2.2 PSI/MIN IN CHAMBER #1  WITHOUT INCIDENT\par PT RESTING AT TX DEPTH WITH VISIBLE CHEST RISE AND FALL OBSERVED CHAMBER SIDE\par PT TOLERATED AIR BREAKS WELL\par PT ASCENDED FROM 2.4 DEEPAK @ 2.2 PSI/MIN WITHOUT INCIDENT\par PT TOLERATED TX WELL\par  Spironolactone Pregnancy And Lactation Text: This medication can cause feminization of the male fetus and should be avoided during pregnancy. The active metabolite is also found in breast milk.

## 2022-10-19 NOTE — CONSULT LETTER
[Dear  ___] : Dear  [unfilled], [Consult Letter:] : I had the pleasure of evaluating your patient, [unfilled]. [( Thank you for referring [unfilled] for consultation for _____ )] : Thank you for referring [unfilled] for consultation for [unfilled] [Please see my note below.] : Please see my note below. [Consult Closing:] : Thank you very much for allowing me to participate in the care of this patient.  If you have any questions, please do not hesitate to contact me. [Sincerely,] : Sincerely, [FreeTextEntry2] : Dr. Charly Triana (Pulm/Ref) [FreeTextEntry3] : Donte Kaur MD \par Attending Surgeon \par Division of Thoracic Surgery \par , Cardiovascular and Thoracic Surgery \par [employer] [employer] [facility]\par \par

## 2022-10-20 ENCOUNTER — APPOINTMENT (OUTPATIENT)
Dept: WOUND CARE | Facility: HOSPITAL | Age: 57
End: 2022-10-20

## 2022-10-20 ENCOUNTER — OUTPATIENT (OUTPATIENT)
Dept: OUTPATIENT SERVICES | Facility: HOSPITAL | Age: 57
LOS: 1 days | Discharge: HOME | End: 2022-10-20
Payer: MEDICARE

## 2022-10-20 VITALS
BODY MASS INDEX: 41.75 KG/M2 | TEMPERATURE: 98.9 F | RESPIRATION RATE: 20 BRPM | SYSTOLIC BLOOD PRESSURE: 138 MMHG | HEART RATE: 66 BPM | OXYGEN SATURATION: 93 % | HEIGHT: 73 IN | WEIGHT: 315 LBS | DIASTOLIC BLOOD PRESSURE: 73 MMHG

## 2022-10-20 DIAGNOSIS — Z01.818 ENCOUNTER FOR OTHER PREPROCEDURAL EXAMINATION: ICD-10-CM

## 2022-10-20 DIAGNOSIS — N18.6 END STAGE RENAL DISEASE: Chronic | ICD-10-CM

## 2022-10-20 DIAGNOSIS — Z94.0 KIDNEY TRANSPLANT STATUS: Chronic | ICD-10-CM

## 2022-10-20 DIAGNOSIS — E11.621 TYPE 2 DIABETES MELLITUS WITH FOOT ULCER: ICD-10-CM

## 2022-10-20 DIAGNOSIS — Z89.421 ACQUIRED ABSENCE OF OTHER RIGHT TOE(S): Chronic | ICD-10-CM

## 2022-10-20 PROCEDURE — G0463: CPT

## 2022-10-20 PROCEDURE — 99213 OFFICE O/P EST LOW 20 MIN: CPT

## 2022-10-20 NOTE — PHYSICAL EXAM
[0] : left 0 [1+] : left 1+ [Varicose Veins Of Lower Extremities] : not present [] : not present [Purpura] : no purpura  [Petechiae] : no petechiae [Skin Ulcer] : ulcer [Skin Induration] : no induration [Alert] : alert [Oriented to Person] : oriented to person [Oriented to Place] : oriented to place [Oriented to Time] : oriented to time [Calm] : calm [de-identified] : adult WM, NAD, alert, Ox 3. [de-identified] : HTN, HLD [de-identified] : Diabetic Charcot right foot deformity [de-identified] : right foot plantar ulcer 5th metatarsal healing ulcer down to skin, subcutaneous tissue, and fat. ulcer lateral head of 5th metatarsal down to skin, subcutaneous tissue, fat. [de-identified] : Diabetic neuropathy  [FreeTextEntry1] : Right Foot Lateral 5th Met [FreeTextEntry2] : 0.5 [FreeTextEntry3] : 0.8 [FreeTextEntry4] : 0.2 [de-identified] : Small Serosanguineous [de-identified] : Intact/  callus- callus shaved by Dr Catherine [de-identified] : Ace Bandages for bandage support applied at pt request & Dr Florin marquez [de-identified] : Mechanically cleansed with Sterile Gauze & Normal saline\par  [de-identified] : Nai/ Dry Dressing & Kerlix [FreeTextEntry7] : Right Plantar Foot [FreeTextEntry8] : 0.5 [FreeTextEntry9] : 1.0 [de-identified] : 0.1 [de-identified] : Small Serosanguineous [de-identified] : Intact/ callus- callus shaved by Dr Catherine [de-identified] : Ace Bandages for bandage support applied at pt request & Dr Florin marquez [de-identified] : Nai/ Dry Dressing & Kerlix [de-identified] : Mechanically cleansed with Sterile Gauze & Normal saline\par  [de-identified] : None [de-identified] : None [de-identified] : 100% [de-identified] : No [de-identified] : None [de-identified] : None [de-identified] : 100% [de-identified] : No

## 2022-10-20 NOTE — REVIEW OF SYSTEMS
[Fever] : no fever [Chills] : no chills [Eye Pain] : no eye pain [Loss Of Hearing] : no hearing loss [Shortness Of Breath] : no shortness of breath [Abdominal Pain] : no abdominal pain [Vomiting] : no vomiting [Skin Lesions] : no skin lesions [Skin Wound] : no skin wound [Anxiety] : no anxiety [Easy Bleeding] : no tendency for easy bleeding [FreeTextEntry5] : HTN,HLD [FreeTextEntry7] : 40.9 BMI , morbid obesity  [FreeTextEntry8] : Kidney Transplant  [FreeTextEntry9] : Associated Diabetic Charcot foot  [de-identified] : plantar right foot midfoot , lateral  DFU 2  , large area of peripheral callus build up , the ulcer is clean and granular s/p surgical intervention  [de-identified] : IDDM with neuropathy  [de-identified] : MARILUZ

## 2022-10-20 NOTE — ASSESSMENT
[Verbal] : Verbal [Demo] : Demo [Patient] : Patient [Good - alert, interested, motivated] : Good - alert, interested, motivated [Dressing changes] : dressing changes [Foot Care] : foot care [Skin Care] : skin care [Pressure relief] : pressure relief [Signs and symptoms of infection] : sign and symptoms of infection [How and When to Call] : how and when to call [Off-loading] : off-loading [Patient responsibility to plan of care] : patient responsibility to plan of care [Stable] : stable [Home] : Home [Ambulatory] : Ambulatory [] : Yes [FreeTextEntry2] : Alteration in skin integrity- promote optimal skin integrity\par  [FreeTextEntry4] : Dr Catherine\par Pt states he is being admitted to [facility] on 11/10/22 to undergo Right Lung Thoracic surgery- Dr Catherine aware\par F/U to [facility] in 1 week

## 2022-10-21 DIAGNOSIS — Z79.4 LONG TERM (CURRENT) USE OF INSULIN: ICD-10-CM

## 2022-10-21 DIAGNOSIS — Z89.421 ACQUIRED ABSENCE OF OTHER RIGHT TOE(S): ICD-10-CM

## 2022-10-21 DIAGNOSIS — Z80.8 FAMILY HISTORY OF MALIGNANT NEOPLASM OF OTHER ORGANS OR SYSTEMS: ICD-10-CM

## 2022-10-21 DIAGNOSIS — Z79.899 OTHER LONG TERM (CURRENT) DRUG THERAPY: ICD-10-CM

## 2022-10-21 DIAGNOSIS — Z80.3 FAMILY HISTORY OF MALIGNANT NEOPLASM OF BREAST: ICD-10-CM

## 2022-10-21 DIAGNOSIS — Z87.81 PERSONAL HISTORY OF (HEALED) TRAUMATIC FRACTURE: ICD-10-CM

## 2022-10-21 DIAGNOSIS — L84 CORNS AND CALLOSITIES: ICD-10-CM

## 2022-10-21 DIAGNOSIS — E78.5 HYPERLIPIDEMIA, UNSPECIFIED: ICD-10-CM

## 2022-10-21 DIAGNOSIS — E11.621 TYPE 2 DIABETES MELLITUS WITH FOOT ULCER: ICD-10-CM

## 2022-10-21 DIAGNOSIS — Z79.82 LONG TERM (CURRENT) USE OF ASPIRIN: ICD-10-CM

## 2022-10-21 DIAGNOSIS — Z86.73 PERSONAL HISTORY OF TRANSIENT ISCHEMIC ATTACK (TIA), AND CEREBRAL INFARCTION WITHOUT RESIDUAL DEFICITS: ICD-10-CM

## 2022-10-21 DIAGNOSIS — L97.412 NON-PRESSURE CHRONIC ULCER OF RIGHT HEEL AND MIDFOOT WITH FAT LAYER EXPOSED: ICD-10-CM

## 2022-10-21 DIAGNOSIS — Z86.16 PERSONAL HISTORY OF COVID-19: ICD-10-CM

## 2022-10-21 DIAGNOSIS — L97.512 NON-PRESSURE CHRONIC ULCER OF OTHER PART OF RIGHT FOOT WITH FAT LAYER EXPOSED: ICD-10-CM

## 2022-10-21 DIAGNOSIS — E11.51 TYPE 2 DIABETES MELLITUS WITH DIABETIC PERIPHERAL ANGIOPATHY WITHOUT GANGRENE: ICD-10-CM

## 2022-10-21 DIAGNOSIS — Z85.828 PERSONAL HISTORY OF OTHER MALIGNANT NEOPLASM OF SKIN: ICD-10-CM

## 2022-10-21 DIAGNOSIS — E11.610 TYPE 2 DIABETES MELLITUS WITH DIABETIC NEUROPATHIC ARTHROPATHY: ICD-10-CM

## 2022-10-21 DIAGNOSIS — Z94.0 KIDNEY TRANSPLANT STATUS: ICD-10-CM

## 2022-10-21 DIAGNOSIS — Z86.718 PERSONAL HISTORY OF OTHER VENOUS THROMBOSIS AND EMBOLISM: ICD-10-CM

## 2022-10-21 DIAGNOSIS — Z83.3 FAMILY HISTORY OF DIABETES MELLITUS: ICD-10-CM

## 2022-10-21 DIAGNOSIS — G47.33 OBSTRUCTIVE SLEEP APNEA (ADULT) (PEDIATRIC): ICD-10-CM

## 2022-10-21 DIAGNOSIS — Z89.422 ACQUIRED ABSENCE OF OTHER LEFT TOE(S): ICD-10-CM

## 2022-10-27 ENCOUNTER — OUTPATIENT (OUTPATIENT)
Dept: OUTPATIENT SERVICES | Facility: HOSPITAL | Age: 57
LOS: 1 days | Discharge: HOME | End: 2022-10-27
Payer: MEDICARE

## 2022-10-27 ENCOUNTER — APPOINTMENT (OUTPATIENT)
Dept: WOUND CARE | Facility: HOSPITAL | Age: 57
End: 2022-10-27

## 2022-10-27 VITALS
HEART RATE: 80 BPM | DIASTOLIC BLOOD PRESSURE: 74 MMHG | OXYGEN SATURATION: 93 % | WEIGHT: 315 LBS | RESPIRATION RATE: 20 BRPM | BODY MASS INDEX: 41.75 KG/M2 | SYSTOLIC BLOOD PRESSURE: 127 MMHG | HEIGHT: 73 IN | TEMPERATURE: 98.8 F

## 2022-10-27 DIAGNOSIS — Z94.0 KIDNEY TRANSPLANT STATUS: Chronic | ICD-10-CM

## 2022-10-27 DIAGNOSIS — N18.6 END STAGE RENAL DISEASE: Chronic | ICD-10-CM

## 2022-10-27 DIAGNOSIS — E11.621 TYPE 2 DIABETES MELLITUS WITH FOOT ULCER: ICD-10-CM

## 2022-10-27 DIAGNOSIS — Z89.421 ACQUIRED ABSENCE OF OTHER RIGHT TOE(S): Chronic | ICD-10-CM

## 2022-10-27 PROCEDURE — 99213 OFFICE O/P EST LOW 20 MIN: CPT

## 2022-10-27 PROCEDURE — G0463: CPT

## 2022-10-27 NOTE — ASSESSMENT
[Verbal] : Verbal [Demo] : Demo [Patient] : Patient [Good - alert, interested, motivated] : Good - alert, interested, motivated [Dressing changes] : dressing changes [Foot Care] : foot care [Skin Care] : skin care [Pressure relief] : pressure relief [Signs and symptoms of infection] : sign and symptoms of infection [How and When to Call] : how and when to call [Off-loading] : off-loading [Patient responsibility to plan of care] : patient responsibility to plan of care [] : Yes [Stable] : stable [Home] : Home [Ambulatory] : Ambulatory [FreeTextEntry2] : Alteration in skin integrity- promote optimal skin integrity\par  [FreeTextEntry4] : Dr Catherine\par Pt states he is being admitted to [facility] on 11/10/22 to undergo Right Lung Thoracic surgery- Dr Catherine aware\par F/U to [facility] in 1 week

## 2022-10-27 NOTE — REVIEW OF SYSTEMS
[Fever] : no fever [Chills] : no chills [Eye Pain] : no eye pain [Loss Of Hearing] : no hearing loss [Shortness Of Breath] : no shortness of breath [Abdominal Pain] : no abdominal pain [Vomiting] : no vomiting [Skin Lesions] : no skin lesions [Skin Wound] : no skin wound [Anxiety] : no anxiety [Easy Bleeding] : no tendency for easy bleeding [FreeTextEntry5] : HTN,HLD [FreeTextEntry7] : 40.9 BMI , morbid obesity  [FreeTextEntry8] : Kidney Transplant  [FreeTextEntry9] : Associated Diabetic Charcot foot  [de-identified] : plantar right foot midfoot , lateral  DFU 2  , large area of peripheral callus build up , the ulcer is clean and granular s/p surgical intervention  [de-identified] : IDDM with neuropathy  [de-identified] : MARILUZ

## 2022-10-28 DIAGNOSIS — Z80.8 FAMILY HISTORY OF MALIGNANT NEOPLASM OF OTHER ORGANS OR SYSTEMS: ICD-10-CM

## 2022-10-28 DIAGNOSIS — E11.51 TYPE 2 DIABETES MELLITUS WITH DIABETIC PERIPHERAL ANGIOPATHY WITHOUT GANGRENE: ICD-10-CM

## 2022-10-28 DIAGNOSIS — Z80.3 FAMILY HISTORY OF MALIGNANT NEOPLASM OF BREAST: ICD-10-CM

## 2022-10-28 DIAGNOSIS — Z87.81 PERSONAL HISTORY OF (HEALED) TRAUMATIC FRACTURE: ICD-10-CM

## 2022-10-28 DIAGNOSIS — Z94.0 KIDNEY TRANSPLANT STATUS: ICD-10-CM

## 2022-10-28 DIAGNOSIS — Z83.3 FAMILY HISTORY OF DIABETES MELLITUS: ICD-10-CM

## 2022-10-28 DIAGNOSIS — Z79.82 LONG TERM (CURRENT) USE OF ASPIRIN: ICD-10-CM

## 2022-10-28 DIAGNOSIS — Z79.899 OTHER LONG TERM (CURRENT) DRUG THERAPY: ICD-10-CM

## 2022-10-28 DIAGNOSIS — Z85.828 PERSONAL HISTORY OF OTHER MALIGNANT NEOPLASM OF SKIN: ICD-10-CM

## 2022-10-28 DIAGNOSIS — E78.5 HYPERLIPIDEMIA, UNSPECIFIED: ICD-10-CM

## 2022-10-28 DIAGNOSIS — Z86.16 PERSONAL HISTORY OF COVID-19: ICD-10-CM

## 2022-10-28 DIAGNOSIS — Z86.73 PERSONAL HISTORY OF TRANSIENT ISCHEMIC ATTACK (TIA), AND CEREBRAL INFARCTION WITHOUT RESIDUAL DEFICITS: ICD-10-CM

## 2022-10-28 DIAGNOSIS — L84 CORNS AND CALLOSITIES: ICD-10-CM

## 2022-10-28 DIAGNOSIS — L97.412 NON-PRESSURE CHRONIC ULCER OF RIGHT HEEL AND MIDFOOT WITH FAT LAYER EXPOSED: ICD-10-CM

## 2022-10-28 DIAGNOSIS — G47.33 OBSTRUCTIVE SLEEP APNEA (ADULT) (PEDIATRIC): ICD-10-CM

## 2022-10-28 DIAGNOSIS — E11.621 TYPE 2 DIABETES MELLITUS WITH FOOT ULCER: ICD-10-CM

## 2022-10-28 DIAGNOSIS — Z89.422 ACQUIRED ABSENCE OF OTHER LEFT TOE(S): ICD-10-CM

## 2022-10-28 DIAGNOSIS — E11.610 TYPE 2 DIABETES MELLITUS WITH DIABETIC NEUROPATHIC ARTHROPATHY: ICD-10-CM

## 2022-10-28 DIAGNOSIS — Z89.421 ACQUIRED ABSENCE OF OTHER RIGHT TOE(S): ICD-10-CM

## 2022-10-28 DIAGNOSIS — L97.512 NON-PRESSURE CHRONIC ULCER OF OTHER PART OF RIGHT FOOT WITH FAT LAYER EXPOSED: ICD-10-CM

## 2022-10-28 DIAGNOSIS — Z79.4 LONG TERM (CURRENT) USE OF INSULIN: ICD-10-CM

## 2022-10-28 DIAGNOSIS — Z86.718 PERSONAL HISTORY OF OTHER VENOUS THROMBOSIS AND EMBOLISM: ICD-10-CM

## 2022-11-01 ENCOUNTER — OUTPATIENT (OUTPATIENT)
Dept: OUTPATIENT SERVICES | Facility: HOSPITAL | Age: 57
LOS: 1 days | End: 2022-11-01

## 2022-11-01 VITALS
HEIGHT: 72 IN | WEIGHT: 315 LBS | DIASTOLIC BLOOD PRESSURE: 79 MMHG | HEART RATE: 70 BPM | TEMPERATURE: 99 F | SYSTOLIC BLOOD PRESSURE: 176 MMHG | OXYGEN SATURATION: 94 % | RESPIRATION RATE: 16 BRPM

## 2022-11-01 DIAGNOSIS — I63.9 CEREBRAL INFARCTION, UNSPECIFIED: ICD-10-CM

## 2022-11-01 DIAGNOSIS — Z89.421 ACQUIRED ABSENCE OF OTHER RIGHT TOE(S): Chronic | ICD-10-CM

## 2022-11-01 DIAGNOSIS — Z94.0 KIDNEY TRANSPLANT STATUS: Chronic | ICD-10-CM

## 2022-11-01 DIAGNOSIS — Z94.0 KIDNEY TRANSPLANT STATUS: ICD-10-CM

## 2022-11-01 DIAGNOSIS — N18.6 END STAGE RENAL DISEASE: Chronic | ICD-10-CM

## 2022-11-01 DIAGNOSIS — Z98.890 OTHER SPECIFIED POSTPROCEDURAL STATES: Chronic | ICD-10-CM

## 2022-11-01 DIAGNOSIS — E11.9 TYPE 2 DIABETES MELLITUS WITHOUT COMPLICATIONS: ICD-10-CM

## 2022-11-01 DIAGNOSIS — J90 PLEURAL EFFUSION, NOT ELSEWHERE CLASSIFIED: ICD-10-CM

## 2022-11-01 DIAGNOSIS — I10 ESSENTIAL (PRIMARY) HYPERTENSION: ICD-10-CM

## 2022-11-01 DIAGNOSIS — G47.33 OBSTRUCTIVE SLEEP APNEA (ADULT) (PEDIATRIC): ICD-10-CM

## 2022-11-01 LAB
A1C WITH ESTIMATED AVERAGE GLUCOSE RESULT: 7.2 % — HIGH (ref 4–5.6)
ANION GAP SERPL CALC-SCNC: 11 MMOL/L — SIGNIFICANT CHANGE UP (ref 7–14)
BLD GP AB SCN SERPL QL: NEGATIVE — SIGNIFICANT CHANGE UP
BUN SERPL-MCNC: 23 MG/DL — SIGNIFICANT CHANGE UP (ref 7–23)
CALCIUM SERPL-MCNC: 9.6 MG/DL — SIGNIFICANT CHANGE UP (ref 8.4–10.5)
CHLORIDE SERPL-SCNC: 100 MMOL/L — SIGNIFICANT CHANGE UP (ref 98–107)
CO2 SERPL-SCNC: 29 MMOL/L — SIGNIFICANT CHANGE UP (ref 22–31)
CREAT SERPL-MCNC: 1.05 MG/DL — SIGNIFICANT CHANGE UP (ref 0.5–1.3)
EGFR: 84 ML/MIN/1.73M2 — SIGNIFICANT CHANGE UP
ESTIMATED AVERAGE GLUCOSE: 160 — SIGNIFICANT CHANGE UP
GLUCOSE SERPL-MCNC: 101 MG/DL — HIGH (ref 70–99)
HCT VFR BLD CALC: 42.2 % — SIGNIFICANT CHANGE UP (ref 39–50)
HGB BLD-MCNC: 13.7 G/DL — SIGNIFICANT CHANGE UP (ref 13–17)
MCHC RBC-ENTMCNC: 27.8 PG — SIGNIFICANT CHANGE UP (ref 27–34)
MCHC RBC-ENTMCNC: 32.5 GM/DL — SIGNIFICANT CHANGE UP (ref 32–36)
MCV RBC AUTO: 85.8 FL — SIGNIFICANT CHANGE UP (ref 80–100)
NRBC # BLD: 0 /100 WBCS — SIGNIFICANT CHANGE UP (ref 0–0)
NRBC # FLD: 0 K/UL — SIGNIFICANT CHANGE UP (ref 0–0)
PLATELET # BLD AUTO: 244 K/UL — SIGNIFICANT CHANGE UP (ref 150–400)
POTASSIUM SERPL-MCNC: 4.2 MMOL/L — SIGNIFICANT CHANGE UP (ref 3.5–5.3)
POTASSIUM SERPL-SCNC: 4.2 MMOL/L — SIGNIFICANT CHANGE UP (ref 3.5–5.3)
RBC # BLD: 4.92 M/UL — SIGNIFICANT CHANGE UP (ref 4.2–5.8)
RBC # FLD: 14.5 % — SIGNIFICANT CHANGE UP (ref 10.3–14.5)
RH IG SCN BLD-IMP: NEGATIVE — SIGNIFICANT CHANGE UP
SODIUM SERPL-SCNC: 140 MMOL/L — SIGNIFICANT CHANGE UP (ref 135–145)
WBC # BLD: 7.85 K/UL — SIGNIFICANT CHANGE UP (ref 3.8–10.5)
WBC # FLD AUTO: 7.85 K/UL — SIGNIFICANT CHANGE UP (ref 3.8–10.5)

## 2022-11-01 RX ORDER — AZATHIOPRINE 100 MG/1
2 TABLET ORAL
Qty: 0 | Refills: 0 | DISCHARGE

## 2022-11-01 RX ORDER — SODIUM CHLORIDE 9 MG/ML
1000 INJECTION, SOLUTION INTRAVENOUS
Refills: 0 | Status: DISCONTINUED | OUTPATIENT
Start: 2022-11-10 | End: 2022-11-12

## 2022-11-01 RX ORDER — CHOLECALCIFEROL (VITAMIN D3) 125 MCG
1 CAPSULE ORAL
Qty: 0 | Refills: 0 | DISCHARGE

## 2022-11-01 RX ORDER — TACROLIMUS 5 MG/1
1.5 CAPSULE ORAL
Qty: 0 | Refills: 0 | DISCHARGE

## 2022-11-01 RX ORDER — METOPROLOL TARTRATE 50 MG
1 TABLET ORAL
Qty: 0 | Refills: 0 | DISCHARGE

## 2022-11-01 RX ORDER — LOVASTATIN 20 MG
1 TABLET ORAL
Qty: 0 | Refills: 0 | DISCHARGE

## 2022-11-01 RX ORDER — GABAPENTIN 400 MG/1
1 CAPSULE ORAL
Qty: 0 | Refills: 0 | DISCHARGE

## 2022-11-01 RX ORDER — ASPIRIN/CALCIUM CARB/MAGNESIUM 324 MG
2 TABLET ORAL
Qty: 0 | Refills: 0 | DISCHARGE

## 2022-11-01 NOTE — H&P PST ADULT - PROBLEM SELECTOR PLAN 2
Pt was instructed to obtain endocrine eval and insulin pump instructions form endocrinology, pt able to verbalize understanding, surgeon notified via email.  Insulin pump attestation form placed in chart, email sent to diabetes educator.

## 2022-11-01 NOTE — H&P PST ADULT - PROBLEM SELECTOR PLAN 5
Pt instructed to take metoprolol, hydralazine, and clonidine AM of surgery with a sip of water, pt able to verbalize understanding.

## 2022-11-01 NOTE — H&P PST ADULT - HISTORY OF PRESENT ILLNESS
55 yo male with preop dx. pleural effusion presents to pre surgical testing.  Pt with h/o pleural effusion s/p thoracentesis 6/2022 and 8/2022.  Pt is scheduled for right video assisted thoracoscopy possible thoracotomy, possible talc pleurodesis, possible pleux, possible decortication.

## 2022-11-01 NOTE — H&P PST ADULT - PROBLEM SELECTOR PLAN 3
Pt instructed to take bactrim tacrolimus and azathioprine AM of surgery with a sip of water, pt able to verbalize understanding.  Renal eval in chart.

## 2022-11-01 NOTE — H&P PST ADULT - SKIN/BREAST COMMENTS
right foot wound x 10 months, wound care weekly at [facility] wound care [place]

## 2022-11-01 NOTE — H&P PST ADULT - SKIN COMMENTS
right foot wound right foot wound, dressing CDI, pt reports dressing changes Q2 days, has appointment for wound care 11/3 and 11/9

## 2022-11-01 NOTE — H&P PST ADULT - ATTENDING COMMENTS
Patient seen and examined agree with above note as modified, where appropriate, by me. The risks, benefits and alternatives of the procedure were explained to the patient including but not limited to the risks of bleeding, infection, prolonged air leak, shortness of breath, oxygen dependance, benign disease and chronic pain. All of the patients questions were answered, he demonstrated understanding and freely consented to the procedure.

## 2022-11-01 NOTE — H&P PST ADULT - NEUROLOGICAL COMMENTS
A&Ox4, no focal deficits h/o CVA, left sided decreased sensation, right facial droop and decreased sensation

## 2022-11-01 NOTE — H&P PST ADULT - NSICDXPASTMEDICALHX_GEN_ALL_CORE_FT
PAST MEDICAL HISTORY:  CKD (chronic kidney disease) Renal Transplant 2013 at [facility]    CVA (cerebral vascular accident) [place] Stroke  low L body sensation  low R face sensation  decreased peripheral vision  poor balance  2015      Diabetes mellitus Type 1 on insulin pump    Diabetic peripheral neuropathy     History of DVT (deep vein thrombosis) LLE 2016, was on Eliquis x 6 months  Was hospitalized for Rhinovirus at the time, intubated    History of pleural effusion right thoracentesis 6/2022 and 8/2022    History of restrictive lung disease     Hyperlipidemia     Hypertension     Obesity (BMI 30-39.9)     CARMEN treated with BiPAP 2L O2 at night    Recurrent squamous cell carcinoma of skin nose, L arm    Squamous cell carcinoma of skin of left ear nose

## 2022-11-01 NOTE — H&P PST ADULT - FUNCTIONAL ASSESSMENT - BASIC MOBILITY PT AGE POP HIDDEN
Patient ID: Liliana is a 69 year old female.    Chief Complaint   Patient presents with   • Office Visit     left HIP pain      HPI    Patient presents for several month history of left hip pain worsening as she tends to enjoy walking as her primary exercise.  He has no specific trauma but describes the pain as dull uncomfortable located in the groin sometimes radiating to the inside or outside of the hip joint.  No radiation to the knee.  Mild favoring of the hip.    Recent x-ray showed mild osteoarthritis of left hip  Current Outpatient Medications   Medication Sig Dispense Refill   • lansoprazole (PREVACID) 30 MG capsule TAKE ONE CAPSULE BY MOUTH ONE TIME DAILY 30 capsule 0   • zolmitriptan (ZOMIG) 2.5 MG tablet Take 1 tablet by mouth as needed for Migraine. Take 1 tablet by mouth at onset of migraine. May repeat after 2 hours if needed. 6 tablet 4   • albuterol 108 (90 Base) MCG/ACT inhaler Inhale 1 puff into the lungs every 4 hours as needed for Shortness of Breath or Wheezing. 1 Inhaler 5   • Cholecalciferol (VITAMIN D) 2000 units capsule Take 1 capsule by mouth daily.     • propranolol (INDERAL) 20 MG tablet Take 20 mg by mouth 3 times daily.       No current facility-administered medications for this visit.     ALLERGIES:   Allergen Reactions   • Iodine   (Environmental Or Med) Other (See Comments)   • Iodinated Diagnostic Agents Other (See Comments)     Possible allergy      History reviewed. No pertinent past medical history.  History reviewed. No pertinent surgical history.     Review of Systems   Constitutional: Negative.  Negative for fever.   HENT: Negative.    Eyes: Negative.    Respiratory: Negative.  Negative for shortness of breath.    Cardiovascular: Negative.  Negative for chest pain.   Gastrointestinal: Negative for nausea.   Genitourinary: Negative.    Musculoskeletal: Positive for joint pain (Left hip as per HPI).   Skin: Negative.  Negative for rash.   Neurological: Negative.  Negative for  dizziness.   All other systems reviewed and are negative.      Vitals: /70 (BP Location: LUE - Left upper extremity, Patient Position: Sitting, Cuff Size: Regular)   Ht 5' 7\" (1.702 m)   Wt 64.9 kg (143 lb)   BMI 22.40 kg/m²   BSA 1.75 m²      Physical Exam  Vitals and nursing note reviewed.   HENT:      Head: Normocephalic and atraumatic.   Eyes:      General: No scleral icterus.     Pupils: Pupils are equal, round, and reactive to light.   Cardiovascular:      Rate and Rhythm: Normal rate and regular rhythm.      Heart sounds: Normal heart sounds. No murmur heard.     Pulmonary:      Breath sounds: Normal breath sounds. No wheezing.   Abdominal:      Palpations: Abdomen is soft.      Tenderness: There is no abdominal tenderness.   Musculoskeletal:         General: Tenderness (Pain to internal and external rotation left hip with near normal excursion) present.   Skin:     General: Skin is warm and dry.   Neurological:      Mental Status: She is alert and oriented to person, place, and time.   Psychiatric:         Mood and Affect: Affect normal.            Assessment and Plan:    (M16.12) Primary osteoarthritis of left hip  (primary encounter diagnosis)      Trial mobic low-dose 1 a day PC for 2 weeks and then as needed.  Start bicycle for primary exercise.    Nature of osteoarthritis and importance of maintaining weight control and resting when flareups occur was discussed.    Good footwear was discussed.    Consider physical therapy bicycling did not ameliorate symptoms within the next 4 to 6 weeks                  Sukumar Conteh MD   Adult

## 2022-11-01 NOTE — H&P PST ADULT - PROBLEM SELECTOR PLAN 1
Pt is scheduled for right video assisted thoracoscopy possible thoracotomy, possible talc pleurodesis, possible pleux, possible decortication 11/10/22.  Verbal and written pre op instructions reviewed with patient and pt able to verbalize understanding.   Verbal and written instructions given with chlorhexidine wash, pt able to verbalize understanding via teach back method.  Pt instructed to follow surgeon's guidelines regarding COVID testing preop.

## 2022-11-01 NOTE — H&P PST ADULT - NSICDXPASTSURGICALHX_GEN_ALL_CORE_FT
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

## 2022-11-02 ENCOUNTER — NON-APPOINTMENT (OUTPATIENT)
Age: 57
End: 2022-11-02

## 2022-11-02 PROBLEM — Z86.718 PERSONAL HISTORY OF OTHER VENOUS THROMBOSIS AND EMBOLISM: Chronic | Status: ACTIVE | Noted: 2020-08-26

## 2022-11-02 PROBLEM — I63.9 CEREBRAL INFARCTION, UNSPECIFIED: Chronic | Status: ACTIVE | Noted: 2017-04-24

## 2022-11-02 PROBLEM — Z87.09 PERSONAL HISTORY OF OTHER DISEASES OF THE RESPIRATORY SYSTEM: Chronic | Status: ACTIVE | Noted: 2022-11-01

## 2022-11-02 PROBLEM — C44.229 SQUAMOUS CELL CARCINOMA OF SKIN OF LEFT EAR AND EXTERNAL AURICULAR CANAL: Chronic | Status: ACTIVE | Noted: 2020-08-26

## 2022-11-02 PROBLEM — G47.33 OBSTRUCTIVE SLEEP APNEA (ADULT) (PEDIATRIC): Chronic | Status: ACTIVE | Noted: 2022-11-01

## 2022-11-03 ENCOUNTER — OUTPATIENT (OUTPATIENT)
Dept: OUTPATIENT SERVICES | Facility: HOSPITAL | Age: 57
LOS: 1 days | Discharge: HOME | End: 2022-11-03
Payer: MEDICARE

## 2022-11-03 ENCOUNTER — APPOINTMENT (OUTPATIENT)
Dept: WOUND CARE | Facility: HOSPITAL | Age: 57
End: 2022-11-03

## 2022-11-03 VITALS
WEIGHT: 315 LBS | DIASTOLIC BLOOD PRESSURE: 71 MMHG | OXYGEN SATURATION: 96 % | SYSTOLIC BLOOD PRESSURE: 159 MMHG | RESPIRATION RATE: 20 BRPM | HEIGHT: 73 IN | BODY MASS INDEX: 41.75 KG/M2 | HEART RATE: 60 BPM | TEMPERATURE: 98.1 F

## 2022-11-03 DIAGNOSIS — N18.6 END STAGE RENAL DISEASE: Chronic | ICD-10-CM

## 2022-11-03 DIAGNOSIS — E11.621 TYPE 2 DIABETES MELLITUS WITH FOOT ULCER: ICD-10-CM

## 2022-11-03 DIAGNOSIS — Z94.0 KIDNEY TRANSPLANT STATUS: Chronic | ICD-10-CM

## 2022-11-03 DIAGNOSIS — Z98.890 OTHER SPECIFIED POSTPROCEDURAL STATES: Chronic | ICD-10-CM

## 2022-11-03 DIAGNOSIS — Z89.421 ACQUIRED ABSENCE OF OTHER RIGHT TOE(S): Chronic | ICD-10-CM

## 2022-11-03 PROCEDURE — 99213 OFFICE O/P EST LOW 20 MIN: CPT

## 2022-11-03 PROCEDURE — G0463: CPT

## 2022-11-03 NOTE — REVIEW OF SYSTEMS
[Fever] : no fever [Chills] : no chills [Eye Pain] : no eye pain [Loss Of Hearing] : no hearing loss [Shortness Of Breath] : no shortness of breath [Abdominal Pain] : no abdominal pain [Vomiting] : no vomiting [Skin Lesions] : no skin lesions [Skin Wound] : no skin wound [Anxiety] : no anxiety [Easy Bleeding] : no tendency for easy bleeding [FreeTextEntry5] : HTN,HLD [FreeTextEntry7] : 40.9 BMI , morbid obesity  [FreeTextEntry8] : Kidney Transplant  [FreeTextEntry9] : Associated Diabetic Charcot foot  [de-identified] : plantar right foot midfoot , lateral  DFU 2  , large area of peripheral callus build up , the ulcer is clean and granular s/p surgical intervention  [de-identified] : IDDM with neuropathy  [de-identified] : MARILUZ

## 2022-11-03 NOTE — PLAN
[FreeTextEntry1] : continue wound care and off loading , # 15 blade used to trim callus around the periphery of each ulcer.  Patient to follow-up with orthotist for adjustment of Crow boot.  Spent 20 minutes for patient care and medical decision making.\par \par

## 2022-11-03 NOTE — PHYSICAL EXAM
[0] : left 0 [1+] : left 1+ [Varicose Veins Of Lower Extremities] : not present [] : not present [Purpura] : no purpura  [Petechiae] : no petechiae [Skin Ulcer] : ulcer [Skin Induration] : no induration [Alert] : alert [Oriented to Person] : oriented to person [Oriented to Place] : oriented to place [Oriented to Time] : oriented to time [Calm] : calm [de-identified] : adult WM, NAD, alert, Ox 3. [de-identified] : HTN, HLD [de-identified] : Diabetic Charcot right foot deformity [de-identified] : right foot plantar ulcer 5th metatarsal healing ulcer down to skin, subcutaneous tissue, and fat. ulcer lateral head of 5th metatarsal down to skin, subcutaneous tissue, fat. [de-identified] : Diabetic neuropathy  [FreeTextEntry1] : Right Foot Lateral 5th Met [FreeTextEntry2] : 0.4 [FreeTextEntry3] : 0.8 [FreeTextEntry4] : 0.1 [de-identified] : Small Serosanguineous [de-identified] : Intact/  callus- callus shaved by Dr Catherine [de-identified] : Ace Bandages for bandage support applied at pt request & Dr Florin marquez [de-identified] : Silver Alginate/ Dry Dressing & Kerlix [de-identified] : Mechanically cleansed with Sterile Gauze & Normal saline\par  [FreeTextEntry7] : Right Plantar Foot [FreeTextEntry8] : 1.1 [FreeTextEntry9] : 1.3 [de-identified] : 0.1 [de-identified] : Small Serosanguineous [de-identified] : Intact/ callus- callus shaved by Dr Catherine [de-identified] : Ace Bandages for bandage support applied at pt request & Dr Florin marquez [de-identified] : Silver Alginate/ Dry Dressing & Kerlix [de-identified] : Mechanically cleansed with Sterile Gauze & Normal saline\par  [de-identified] : None [de-identified] : None [de-identified] : 100% [de-identified] : No [de-identified] : None [de-identified] : None [de-identified] : 100% [de-identified] : No

## 2022-11-03 NOTE — ASSESSMENT
[Verbal] : Verbal [Demo] : Demo [Patient] : Patient [Good - alert, interested, motivated] : Good - alert, interested, motivated [Dressing changes] : dressing changes [Foot Care] : foot care [Skin Care] : skin care [Pressure relief] : pressure relief [Signs and symptoms of infection] : sign and symptoms of infection [How and When to Call] : how and when to call [Off-loading] : off-loading [Patient responsibility to plan of care] : patient responsibility to plan of care [Stable] : stable [Home] : Home [Ambulatory] : Ambulatory [Verbalizes knowledge/Understanding] : Verbalizes knowledge/understanding [] : No [FreeTextEntry2] : Alteration in skin integrity- promote optimal skin integrity\par  [FreeTextEntry3] : wounds larger [FreeTextEntry4] : Dr Catherine/ Photos taken\par Pt states his offloading boot strap broke & he has visit to Orthotist today for repair of same- Dr Florin paredes- DPM to contact Orthotist to discuss revision of boot\par Pt states he is being admitted to [facility] on 11/10/22 to undergo Right Lung Thoracic surgery- Dr Florin paredes\par F/U to [facility] in 1 week

## 2022-11-03 NOTE — HISTORY OF PRESENT ILLNESS
[FreeTextEntry1] : DFU plantar base 5th metatarsal and the lateral head of the 5th metatarsal , both smaller , clean - soi.  Patient relates that he will be seeing an orthotist today for adjustment of his Crow boot.

## 2022-11-04 DIAGNOSIS — E11.51 TYPE 2 DIABETES MELLITUS WITH DIABETIC PERIPHERAL ANGIOPATHY WITHOUT GANGRENE: ICD-10-CM

## 2022-11-04 DIAGNOSIS — L97.512 NON-PRESSURE CHRONIC ULCER OF OTHER PART OF RIGHT FOOT WITH FAT LAYER EXPOSED: ICD-10-CM

## 2022-11-04 DIAGNOSIS — Z80.3 FAMILY HISTORY OF MALIGNANT NEOPLASM OF BREAST: ICD-10-CM

## 2022-11-04 DIAGNOSIS — E11.610 TYPE 2 DIABETES MELLITUS WITH DIABETIC NEUROPATHIC ARTHROPATHY: ICD-10-CM

## 2022-11-04 DIAGNOSIS — Z89.421 ACQUIRED ABSENCE OF OTHER RIGHT TOE(S): ICD-10-CM

## 2022-11-04 DIAGNOSIS — Z80.8 FAMILY HISTORY OF MALIGNANT NEOPLASM OF OTHER ORGANS OR SYSTEMS: ICD-10-CM

## 2022-11-04 DIAGNOSIS — L84 CORNS AND CALLOSITIES: ICD-10-CM

## 2022-11-04 DIAGNOSIS — Z89.422 ACQUIRED ABSENCE OF OTHER LEFT TOE(S): ICD-10-CM

## 2022-11-04 DIAGNOSIS — Z83.3 FAMILY HISTORY OF DIABETES MELLITUS: ICD-10-CM

## 2022-11-04 DIAGNOSIS — E11.621 TYPE 2 DIABETES MELLITUS WITH FOOT ULCER: ICD-10-CM

## 2022-11-04 DIAGNOSIS — L97.412 NON-PRESSURE CHRONIC ULCER OF RIGHT HEEL AND MIDFOOT WITH FAT LAYER EXPOSED: ICD-10-CM

## 2022-11-04 DIAGNOSIS — Z86.73 PERSONAL HISTORY OF TRANSIENT ISCHEMIC ATTACK (TIA), AND CEREBRAL INFARCTION WITHOUT RESIDUAL DEFICITS: ICD-10-CM

## 2022-11-04 DIAGNOSIS — Z86.16 PERSONAL HISTORY OF COVID-19: ICD-10-CM

## 2022-11-04 DIAGNOSIS — Z79.899 OTHER LONG TERM (CURRENT) DRUG THERAPY: ICD-10-CM

## 2022-11-04 DIAGNOSIS — Z85.828 PERSONAL HISTORY OF OTHER MALIGNANT NEOPLASM OF SKIN: ICD-10-CM

## 2022-11-04 DIAGNOSIS — Z87.81 PERSONAL HISTORY OF (HEALED) TRAUMATIC FRACTURE: ICD-10-CM

## 2022-11-04 DIAGNOSIS — Z94.0 KIDNEY TRANSPLANT STATUS: ICD-10-CM

## 2022-11-04 DIAGNOSIS — Z79.4 LONG TERM (CURRENT) USE OF INSULIN: ICD-10-CM

## 2022-11-04 DIAGNOSIS — Z86.718 PERSONAL HISTORY OF OTHER VENOUS THROMBOSIS AND EMBOLISM: ICD-10-CM

## 2022-11-04 DIAGNOSIS — E78.5 HYPERLIPIDEMIA, UNSPECIFIED: ICD-10-CM

## 2022-11-04 DIAGNOSIS — G47.33 OBSTRUCTIVE SLEEP APNEA (ADULT) (PEDIATRIC): ICD-10-CM

## 2022-11-08 LAB — SARS-COV-2 N GENE NPH QL NAA+PROBE: NOT DETECTED

## 2022-11-09 ENCOUNTER — APPOINTMENT (OUTPATIENT)
Dept: WOUND CARE | Facility: HOSPITAL | Age: 57
End: 2022-11-09

## 2022-11-09 ENCOUNTER — OUTPATIENT (OUTPATIENT)
Dept: OUTPATIENT SERVICES | Facility: HOSPITAL | Age: 57
LOS: 1 days | Discharge: HOME | End: 2022-11-09
Payer: MEDICARE

## 2022-11-09 VITALS
WEIGHT: 315 LBS | BODY MASS INDEX: 42.66 KG/M2 | HEART RATE: 76 BPM | OXYGEN SATURATION: 96 % | RESPIRATION RATE: 16 BRPM | HEIGHT: 72 IN | TEMPERATURE: 99.1 F | SYSTOLIC BLOOD PRESSURE: 178 MMHG | DIASTOLIC BLOOD PRESSURE: 78 MMHG

## 2022-11-09 DIAGNOSIS — Z98.890 OTHER SPECIFIED POSTPROCEDURAL STATES: Chronic | ICD-10-CM

## 2022-11-09 DIAGNOSIS — N18.6 END STAGE RENAL DISEASE: Chronic | ICD-10-CM

## 2022-11-09 DIAGNOSIS — E11.621 TYPE 2 DIABETES MELLITUS WITH FOOT ULCER: ICD-10-CM

## 2022-11-09 DIAGNOSIS — Z89.421 ACQUIRED ABSENCE OF OTHER RIGHT TOE(S): Chronic | ICD-10-CM

## 2022-11-09 DIAGNOSIS — Z94.0 KIDNEY TRANSPLANT STATUS: Chronic | ICD-10-CM

## 2022-11-09 PROCEDURE — 99213 OFFICE O/P EST LOW 20 MIN: CPT

## 2022-11-09 PROCEDURE — G0463: CPT

## 2022-11-09 NOTE — PHYSICAL EXAM
[4 x 4] : 4 x 4  [Abdominal Pad] : Abdominal Pad [0] : left 0 [1+] : left 1+ [Varicose Veins Of Lower Extremities] : not present [] : not present [Purpura] : no purpura  [Petechiae] : no petechiae [Skin Ulcer] : ulcer [Skin Induration] : no induration [Alert] : alert [Oriented to Person] : oriented to person [Oriented to Place] : oriented to place [Oriented to Time] : oriented to time [Calm] : calm [de-identified] : adult WM, NAD, alert, Ox 3. [de-identified] : HTN, HLD [de-identified] : right foot plantar ulcer 5th metatarsal healing ulcer down to skin, subcutaneous tissue, and fat. ulcer lateral head of 5th metatarsal down to skin, subcutaneous tissue, fat. [de-identified] : Diabetic Charcot right foot deformity [de-identified] : Diabetic neuropathy  [FreeTextEntry1] : Right Plantar Foot [FreeTextEntry2] : 1.7 [FreeTextEntry3] : 1.8 [FreeTextEntry4] : 0.2 [de-identified] : Moderate serosanguineous [de-identified] : /Diabetic [de-identified] : none [de-identified] : callused [de-identified] : 100% [de-identified] : none [de-identified] : Alginate Ag [de-identified] : Mechanically cleansed with sterile gauze and normal saline 0.9%\par Dry Dressing\par  [FreeTextEntry7] : Right Lateral Foot [FreeTextEntry8] : 0.8 [de-identified] : 1.0 [FreeTextEntry9] : 0.4 [de-identified] : Small serosanguineous [de-identified] : Yes [de-identified] : 6 - 12 o'clock [de-identified] : callused [de-identified] : none [de-identified] : 100% [de-identified] : Alginate Ag [de-identified] : Mechanically cleansed with sterile gauze and normal saline 0.9%\par Dry Dressing\par  [TWNoteComboBox5] : No [TWNoteComboBox6] : Pressure [de-identified] : No [de-identified] : other [de-identified] : None [de-identified] : 3x Weekly [de-identified] : Primary Dressing [de-identified] : Diabetic [de-identified] : None [de-identified] : Yes [de-identified] : 3x Weekly [de-identified] : Primary Dressing

## 2022-11-09 NOTE — HISTORY OF PRESENT ILLNESS
[FreeTextEntry1] : DFU plantar base 5th metatarsal and the lateral head of the 5th metatarsal , both smaller , clean - soi.  Patient relates that he will be going to the hospital tomorrow for a cardiac procedure.  Relates that he was able to see the orthotist for an adjustment of his Crow boot.  Denies any other complaints at this time.

## 2022-11-09 NOTE — ASSESSMENT
[Verbal] : Verbal [Written] : Written [Demo] : Demo [Patient] : Patient [Good - alert, interested, motivated] : Good - alert, interested, motivated [Demonstrates independently] : demonstrates independently [Dressing changes] : dressing changes [Foot Care] : foot care [Skin Care] : skin care [Signs and symptoms of infection] : sign and symptoms of infection [Surgery] : surgery [Venous Disease] : venous disease [Nutrition] : nutrition [How and When to Call] : how and when to call [Labs and Tests] : labs and tests [Off-loading] : off-loading [Compression Therapy] : compression therapy [Patient responsibility to plan of care] : patient responsibility to plan of care [Glycemic Control] : glycemic control [Stable] : stable [Home] : Home [Ambulatory] : Ambulatory [Not Applicable - Long Term Care/Home Health Agency] : Long Term Care/Home Health Agency: Not Applicable [] : No [FreeTextEntry2] : Infection prevention \par Wound care (dressing changes)\par Maintain optimal skin integrity to high pressure areas\par Compression therapy\par Nutrition and wound healing\par Elevation and low sodium compliance.\par Pressure relief/ Pressure redistribution\par Offloading the stress on skin structures and decreasing potential pathologic biomechanical influences. [FreeTextEntry4] : Pt is scheduled for Cardiothoracic surgery tomorrow\par Pt to f/u once discharged from the hospital

## 2022-11-09 NOTE — ASU PATIENT PROFILE, ADULT - NSICDXPASTMEDICALHX_GEN_ALL_CORE_FT
PAST MEDICAL HISTORY:  CKD (chronic kidney disease) Renal Transplant 2013 at [facility]    CVA (cerebral vascular accident) [place] Stroke  low L body sensation  low R face sensation  decreased peripheral vision  poor balance  2015      Diabetes mellitus Type 1 on insulin pump    Diabetic peripheral neuropathy     History of DVT (deep vein thrombosis) LLE 2016, was on Eliquis x 6 months  Was hospitalized for Rhinovirus at the time, intubated    History of pleural effusion right thoracentesis 6/2022 and 8/2022    History of restrictive lung disease     Hyperlipidemia     Hypertension     Obesity (BMI 30-39.9)     CARMEN treated with BiPAP 2L O2 at night    Recurrent squamous cell carcinoma of skin nose, L arm    Squamous cell carcinoma of skin of left ear nose

## 2022-11-09 NOTE — PLAN
[FreeTextEntry1] : continue wound care and off loading , # 15 blade used to trim callus around the periphery of each ulcer.  Spent 20 minutes for patient care and medical decision making.  Patient remains at high risk for infection, life-threatening sepsis, amputation, limb loss, and death.\par \par

## 2022-11-09 NOTE — REVIEW OF SYSTEMS
[Chills] : no chills [Fever] : no fever [Eye Pain] : no eye pain [Loss Of Hearing] : no hearing loss [Shortness Of Breath] : no shortness of breath [Abdominal Pain] : no abdominal pain [Vomiting] : no vomiting [Skin Lesions] : no skin lesions [Skin Wound] : no skin wound [Anxiety] : no anxiety [Easy Bleeding] : no tendency for easy bleeding [FreeTextEntry5] : HTN,HLD [FreeTextEntry7] : 40.9 BMI , morbid obesity  [FreeTextEntry8] : Kidney Transplant  [FreeTextEntry9] : Associated Diabetic Charcot foot  [de-identified] : plantar right foot midfoot , lateral  DFU 2  , large area of peripheral callus build up , the ulcer is clean and granular s/p surgical intervention  [de-identified] : MARILUZ [de-identified] : IDDM with neuropathy

## 2022-11-10 ENCOUNTER — INPATIENT (INPATIENT)
Facility: HOSPITAL | Age: 57
LOS: 4 days | Discharge: HOME | End: 2022-11-15
Attending: THORACIC SURGERY (CARDIOTHORACIC VASCULAR SURGERY) | Admitting: THORACIC SURGERY (CARDIOTHORACIC VASCULAR SURGERY)

## 2022-11-10 ENCOUNTER — APPOINTMENT (OUTPATIENT)
Dept: THORACIC SURGERY | Facility: HOSPITAL | Age: 57
End: 2022-11-10

## 2022-11-10 ENCOUNTER — RESULT REVIEW (OUTPATIENT)
Age: 57
End: 2022-11-10

## 2022-11-10 VITALS
OXYGEN SATURATION: 96 % | HEIGHT: 72 IN | HEART RATE: 68 BPM | WEIGHT: 315 LBS | TEMPERATURE: 98 F | RESPIRATION RATE: 18 BRPM | DIASTOLIC BLOOD PRESSURE: 69 MMHG | SYSTOLIC BLOOD PRESSURE: 154 MMHG

## 2022-11-10 DIAGNOSIS — Z79.899 OTHER LONG TERM (CURRENT) DRUG THERAPY: ICD-10-CM

## 2022-11-10 DIAGNOSIS — Z87.81 PERSONAL HISTORY OF (HEALED) TRAUMATIC FRACTURE: ICD-10-CM

## 2022-11-10 DIAGNOSIS — E78.5 HYPERLIPIDEMIA, UNSPECIFIED: ICD-10-CM

## 2022-11-10 DIAGNOSIS — L84 CORNS AND CALLOSITIES: ICD-10-CM

## 2022-11-10 DIAGNOSIS — E10.8 TYPE 1 DIABETES MELLITUS WITH UNSPECIFIED COMPLICATIONS: ICD-10-CM

## 2022-11-10 DIAGNOSIS — E11.610 TYPE 2 DIABETES MELLITUS WITH DIABETIC NEUROPATHIC ARTHROPATHY: ICD-10-CM

## 2022-11-10 DIAGNOSIS — Z86.16 PERSONAL HISTORY OF COVID-19: ICD-10-CM

## 2022-11-10 DIAGNOSIS — Z94.0 KIDNEY TRANSPLANT STATUS: ICD-10-CM

## 2022-11-10 DIAGNOSIS — E11.51 TYPE 2 DIABETES MELLITUS WITH DIABETIC PERIPHERAL ANGIOPATHY WITHOUT GANGRENE: ICD-10-CM

## 2022-11-10 DIAGNOSIS — Z80.8 FAMILY HISTORY OF MALIGNANT NEOPLASM OF OTHER ORGANS OR SYSTEMS: ICD-10-CM

## 2022-11-10 DIAGNOSIS — Z89.422 ACQUIRED ABSENCE OF OTHER LEFT TOE(S): ICD-10-CM

## 2022-11-10 DIAGNOSIS — L97.412 NON-PRESSURE CHRONIC ULCER OF RIGHT HEEL AND MIDFOOT WITH FAT LAYER EXPOSED: ICD-10-CM

## 2022-11-10 DIAGNOSIS — L97.512 NON-PRESSURE CHRONIC ULCER OF OTHER PART OF RIGHT FOOT WITH FAT LAYER EXPOSED: ICD-10-CM

## 2022-11-10 DIAGNOSIS — Z89.421 ACQUIRED ABSENCE OF OTHER RIGHT TOE(S): ICD-10-CM

## 2022-11-10 DIAGNOSIS — Z89.421 ACQUIRED ABSENCE OF OTHER RIGHT TOE(S): Chronic | ICD-10-CM

## 2022-11-10 DIAGNOSIS — G47.33 OBSTRUCTIVE SLEEP APNEA (ADULT) (PEDIATRIC): ICD-10-CM

## 2022-11-10 DIAGNOSIS — Z80.3 FAMILY HISTORY OF MALIGNANT NEOPLASM OF BREAST: ICD-10-CM

## 2022-11-10 DIAGNOSIS — N18.6 END STAGE RENAL DISEASE: Chronic | ICD-10-CM

## 2022-11-10 DIAGNOSIS — Z94.0 KIDNEY TRANSPLANT STATUS: Chronic | ICD-10-CM

## 2022-11-10 DIAGNOSIS — Z83.3 FAMILY HISTORY OF DIABETES MELLITUS: ICD-10-CM

## 2022-11-10 DIAGNOSIS — J90 PLEURAL EFFUSION, NOT ELSEWHERE CLASSIFIED: ICD-10-CM

## 2022-11-10 DIAGNOSIS — Z86.73 PERSONAL HISTORY OF TRANSIENT ISCHEMIC ATTACK (TIA), AND CEREBRAL INFARCTION WITHOUT RESIDUAL DEFICITS: ICD-10-CM

## 2022-11-10 DIAGNOSIS — Z86.718 PERSONAL HISTORY OF OTHER VENOUS THROMBOSIS AND EMBOLISM: ICD-10-CM

## 2022-11-10 DIAGNOSIS — Z98.890 OTHER SPECIFIED POSTPROCEDURAL STATES: Chronic | ICD-10-CM

## 2022-11-10 DIAGNOSIS — Z79.4 LONG TERM (CURRENT) USE OF INSULIN: ICD-10-CM

## 2022-11-10 DIAGNOSIS — Z85.828 PERSONAL HISTORY OF OTHER MALIGNANT NEOPLASM OF SKIN: ICD-10-CM

## 2022-11-10 DIAGNOSIS — E11.621 TYPE 2 DIABETES MELLITUS WITH FOOT ULCER: ICD-10-CM

## 2022-11-10 LAB
ANION GAP SERPL CALC-SCNC: 11 MMOL/L — SIGNIFICANT CHANGE UP (ref 7–14)
BLOOD GAS ARTERIAL - LYTES,HGB,ICA,LACT RESULT: SIGNIFICANT CHANGE UP
BUN SERPL-MCNC: 20 MG/DL — SIGNIFICANT CHANGE UP (ref 7–23)
CALCIUM SERPL-MCNC: 8.8 MG/DL — SIGNIFICANT CHANGE UP (ref 8.4–10.5)
CHLORIDE SERPL-SCNC: 101 MMOL/L — SIGNIFICANT CHANGE UP (ref 98–107)
CO2 SERPL-SCNC: 27 MMOL/L — SIGNIFICANT CHANGE UP (ref 22–31)
CREAT SERPL-MCNC: 0.99 MG/DL — SIGNIFICANT CHANGE UP (ref 0.5–1.3)
EGFR: 90 ML/MIN/1.73M2 — SIGNIFICANT CHANGE UP
GAS PNL BLDA: SIGNIFICANT CHANGE UP
GLUCOSE BLDC GLUCOMTR-MCNC: 114 MG/DL — HIGH (ref 70–99)
GLUCOSE BLDC GLUCOMTR-MCNC: 118 MG/DL — HIGH (ref 70–99)
GLUCOSE BLDC GLUCOMTR-MCNC: 125 MG/DL — HIGH (ref 70–99)
GLUCOSE BLDC GLUCOMTR-MCNC: 127 MG/DL — HIGH (ref 70–99)
GLUCOSE BLDC GLUCOMTR-MCNC: 133 MG/DL — HIGH (ref 70–99)
GLUCOSE BLDC GLUCOMTR-MCNC: 98 MG/DL — SIGNIFICANT CHANGE UP (ref 70–99)
GLUCOSE SERPL-MCNC: 120 MG/DL — HIGH (ref 70–99)
MAGNESIUM SERPL-MCNC: 1.7 MG/DL — SIGNIFICANT CHANGE UP (ref 1.6–2.6)
PHOSPHATE SERPL-MCNC: 4.2 MG/DL — SIGNIFICANT CHANGE UP (ref 2.5–4.5)
POTASSIUM SERPL-MCNC: 4.7 MMOL/L — SIGNIFICANT CHANGE UP (ref 3.5–5.3)
POTASSIUM SERPL-SCNC: 4.7 MMOL/L — SIGNIFICANT CHANGE UP (ref 3.5–5.3)
SODIUM SERPL-SCNC: 139 MMOL/L — SIGNIFICANT CHANGE UP (ref 135–145)

## 2022-11-10 PROCEDURE — 99233 SBSQ HOSP IP/OBS HIGH 50: CPT

## 2022-11-10 PROCEDURE — 32652 THORACOSCOPY REM TOTL CORTEX: CPT

## 2022-11-10 PROCEDURE — 71045 X-RAY EXAM CHEST 1 VIEW: CPT | Mod: 26

## 2022-11-10 PROCEDURE — 99223 1ST HOSP IP/OBS HIGH 75: CPT | Mod: GC

## 2022-11-10 PROCEDURE — 31622 DX BRONCHOSCOPE/WASH: CPT

## 2022-11-10 PROCEDURE — 32220 RELEASE OF LUNG: CPT

## 2022-11-10 DEVICE — CHEST DRAIN THORACIC ARGYLE PVC 28FR RIGHT ANGLE: Type: IMPLANTABLE DEVICE | Status: FUNCTIONAL

## 2022-11-10 DEVICE — CHEST DRAIN THORACIC ARGYLE PVC 28FR STRAIGHT: Type: IMPLANTABLE DEVICE | Status: FUNCTIONAL

## 2022-11-10 RX ORDER — ACETAMINOPHEN 500 MG
1000 TABLET ORAL ONCE
Refills: 0 | Status: COMPLETED | OUTPATIENT
Start: 2022-11-10 | End: 2022-11-10

## 2022-11-10 RX ORDER — DEXTROSE 50 % IN WATER 50 %
15 SYRINGE (ML) INTRAVENOUS ONCE
Refills: 0 | Status: DISCONTINUED | OUTPATIENT
Start: 2022-11-10 | End: 2022-11-10

## 2022-11-10 RX ORDER — INSULIN GLARGINE 100 [IU]/ML
48 INJECTION, SOLUTION SUBCUTANEOUS ONCE
Refills: 0 | Status: COMPLETED | OUTPATIENT
Start: 2022-11-10 | End: 2022-11-10

## 2022-11-10 RX ORDER — CEFOTETAN DISODIUM 1 G
2 VIAL (EA) INJECTION EVERY 12 HOURS
Refills: 0 | Status: COMPLETED | OUTPATIENT
Start: 2022-11-10 | End: 2022-11-12

## 2022-11-10 RX ORDER — NALOXONE HYDROCHLORIDE 4 MG/.1ML
0.1 SPRAY NASAL
Refills: 0 | Status: DISCONTINUED | OUTPATIENT
Start: 2022-11-10 | End: 2022-11-15

## 2022-11-10 RX ORDER — INSULIN LISPRO 100/ML
5 VIAL (ML) SUBCUTANEOUS
Refills: 0 | Status: DISCONTINUED | OUTPATIENT
Start: 2022-11-10 | End: 2022-11-11

## 2022-11-10 RX ORDER — LIDOCAINE 4 G/100G
1 CREAM TOPICAL DAILY
Refills: 0 | Status: DISCONTINUED | OUTPATIENT
Start: 2022-11-10 | End: 2022-11-15

## 2022-11-10 RX ORDER — SODIUM CHLORIDE 9 MG/ML
1000 INJECTION, SOLUTION INTRAVENOUS
Refills: 0 | Status: DISCONTINUED | OUTPATIENT
Start: 2022-11-10 | End: 2022-11-10

## 2022-11-10 RX ORDER — SENNA PLUS 8.6 MG/1
2 TABLET ORAL AT BEDTIME
Refills: 0 | Status: DISCONTINUED | OUTPATIENT
Start: 2022-11-10 | End: 2022-11-15

## 2022-11-10 RX ORDER — DEXTROSE 50 % IN WATER 50 %
25 SYRINGE (ML) INTRAVENOUS ONCE
Refills: 0 | Status: DISCONTINUED | OUTPATIENT
Start: 2022-11-10 | End: 2022-11-10

## 2022-11-10 RX ORDER — MAGNESIUM SULFATE 500 MG/ML
2 VIAL (ML) INJECTION ONCE
Refills: 0 | Status: COMPLETED | OUTPATIENT
Start: 2022-11-10 | End: 2022-11-10

## 2022-11-10 RX ORDER — HYDROMORPHONE HYDROCHLORIDE 2 MG/ML
30 INJECTION INTRAMUSCULAR; INTRAVENOUS; SUBCUTANEOUS
Refills: 0 | Status: DISCONTINUED | OUTPATIENT
Start: 2022-11-10 | End: 2022-11-12

## 2022-11-10 RX ORDER — AZATHIOPRINE 100 MG/1
100 TABLET ORAL DAILY
Refills: 0 | Status: DISCONTINUED | OUTPATIENT
Start: 2022-11-10 | End: 2022-11-15

## 2022-11-10 RX ORDER — ACETAMINOPHEN 500 MG
1000 TABLET ORAL ONCE
Refills: 0 | Status: DISCONTINUED | OUTPATIENT
Start: 2022-11-10 | End: 2022-11-15

## 2022-11-10 RX ORDER — INSULIN LISPRO 100/ML
VIAL (ML) SUBCUTANEOUS
Refills: 0 | Status: DISCONTINUED | OUTPATIENT
Start: 2022-11-10 | End: 2022-11-11

## 2022-11-10 RX ORDER — HEPARIN SODIUM 5000 [USP'U]/ML
7500 INJECTION INTRAVENOUS; SUBCUTANEOUS EVERY 8 HOURS
Refills: 0 | Status: DISCONTINUED | OUTPATIENT
Start: 2022-11-10 | End: 2022-11-15

## 2022-11-10 RX ORDER — ACETAMINOPHEN 500 MG
975 TABLET ORAL ONCE
Refills: 0 | Status: COMPLETED | OUTPATIENT
Start: 2022-11-10 | End: 2022-11-10

## 2022-11-10 RX ORDER — POLYETHYLENE GLYCOL 3350 17 G/17G
17 POWDER, FOR SOLUTION ORAL DAILY
Refills: 0 | Status: DISCONTINUED | OUTPATIENT
Start: 2022-11-10 | End: 2022-11-15

## 2022-11-10 RX ORDER — GLUCAGON INJECTION, SOLUTION 0.5 MG/.1ML
1 INJECTION, SOLUTION SUBCUTANEOUS ONCE
Refills: 0 | Status: DISCONTINUED | OUTPATIENT
Start: 2022-11-10 | End: 2022-11-10

## 2022-11-10 RX ORDER — INSULIN LISPRO 100/ML
VIAL (ML) SUBCUTANEOUS AT BEDTIME
Refills: 0 | Status: DISCONTINUED | OUTPATIENT
Start: 2022-11-10 | End: 2022-11-11

## 2022-11-10 RX ORDER — ALBUMIN HUMAN 25 %
250 VIAL (ML) INTRAVENOUS ONCE
Refills: 0 | Status: COMPLETED | OUTPATIENT
Start: 2022-11-10 | End: 2022-11-10

## 2022-11-10 RX ORDER — TACROLIMUS 5 MG/1
1.5 CAPSULE ORAL EVERY 12 HOURS
Refills: 0 | Status: DISCONTINUED | OUTPATIENT
Start: 2022-11-10 | End: 2022-11-15

## 2022-11-10 RX ORDER — GABAPENTIN 400 MG/1
300 CAPSULE ORAL
Refills: 0 | Status: DISCONTINUED | OUTPATIENT
Start: 2022-11-10 | End: 2022-11-15

## 2022-11-10 RX ORDER — LANOLIN ALCOHOL/MO/W.PET/CERES
6 CREAM (GRAM) TOPICAL AT BEDTIME
Refills: 0 | Status: DISCONTINUED | OUTPATIENT
Start: 2022-11-10 | End: 2022-11-15

## 2022-11-10 RX ORDER — ATORVASTATIN CALCIUM 80 MG/1
10 TABLET, FILM COATED ORAL AT BEDTIME
Refills: 0 | Status: DISCONTINUED | OUTPATIENT
Start: 2022-11-10 | End: 2022-11-15

## 2022-11-10 RX ORDER — TACROLIMUS 5 MG/1
1.5 CAPSULE ORAL
Refills: 0 | Status: DISCONTINUED | OUTPATIENT
Start: 2022-11-10 | End: 2022-11-10

## 2022-11-10 RX ORDER — ASPIRIN/CALCIUM CARB/MAGNESIUM 324 MG
81 TABLET ORAL DAILY
Refills: 0 | Status: DISCONTINUED | OUTPATIENT
Start: 2022-11-10 | End: 2022-11-15

## 2022-11-10 RX ORDER — METOPROLOL TARTRATE 50 MG
75 TABLET ORAL EVERY 12 HOURS
Refills: 0 | Status: DISCONTINUED | OUTPATIENT
Start: 2022-11-10 | End: 2022-11-10

## 2022-11-10 RX ORDER — GABAPENTIN 400 MG/1
300 CAPSULE ORAL ONCE
Refills: 0 | Status: COMPLETED | OUTPATIENT
Start: 2022-11-10 | End: 2022-11-10

## 2022-11-10 RX ORDER — ONDANSETRON 8 MG/1
4 TABLET, FILM COATED ORAL EVERY 6 HOURS
Refills: 0 | Status: DISCONTINUED | OUTPATIENT
Start: 2022-11-10 | End: 2022-11-15

## 2022-11-10 RX ORDER — HYDROMORPHONE HYDROCHLORIDE 2 MG/ML
0.5 INJECTION INTRAMUSCULAR; INTRAVENOUS; SUBCUTANEOUS
Refills: 0 | Status: DISCONTINUED | OUTPATIENT
Start: 2022-11-10 | End: 2022-11-12

## 2022-11-10 RX ORDER — DEXTROSE 50 % IN WATER 50 %
12.5 SYRINGE (ML) INTRAVENOUS ONCE
Refills: 0 | Status: DISCONTINUED | OUTPATIENT
Start: 2022-11-10 | End: 2022-11-10

## 2022-11-10 RX ORDER — DEXTROSE 50 % IN WATER 50 %
25 SYRINGE (ML) INTRAVENOUS ONCE
Refills: 0 | Status: DISCONTINUED | OUTPATIENT
Start: 2022-11-10 | End: 2022-11-11

## 2022-11-10 RX ADMIN — HYDROMORPHONE HYDROCHLORIDE 30 MILLILITER(S): 2 INJECTION INTRAMUSCULAR; INTRAVENOUS; SUBCUTANEOUS at 19:14

## 2022-11-10 RX ADMIN — Medication 6 MILLIGRAM(S): at 23:29

## 2022-11-10 RX ADMIN — ATORVASTATIN CALCIUM 10 MILLIGRAM(S): 80 TABLET, FILM COATED ORAL at 23:28

## 2022-11-10 RX ADMIN — AZATHIOPRINE 100 MILLIGRAM(S): 100 TABLET ORAL at 22:05

## 2022-11-10 RX ADMIN — Medication 25 GRAM(S): at 21:41

## 2022-11-10 RX ADMIN — LIDOCAINE 1 PATCH: 4 CREAM TOPICAL at 20:53

## 2022-11-10 RX ADMIN — SENNA PLUS 2 TABLET(S): 8.6 TABLET ORAL at 21:38

## 2022-11-10 RX ADMIN — TACROLIMUS 1.5 MILLIGRAM(S): 5 CAPSULE ORAL at 21:37

## 2022-11-10 RX ADMIN — Medication 125 MILLILITER(S): at 20:10

## 2022-11-10 RX ADMIN — Medication 1000 MILLIGRAM(S): at 23:16

## 2022-11-10 RX ADMIN — GABAPENTIN 300 MILLIGRAM(S): 400 CAPSULE ORAL at 19:14

## 2022-11-10 RX ADMIN — INSULIN GLARGINE 48 UNIT(S): 100 INJECTION, SOLUTION SUBCUTANEOUS at 20:37

## 2022-11-10 RX ADMIN — HEPARIN SODIUM 7500 UNIT(S): 5000 INJECTION INTRAVENOUS; SUBCUTANEOUS at 22:05

## 2022-11-10 RX ADMIN — Medication 400 MILLIGRAM(S): at 21:38

## 2022-11-10 RX ADMIN — HEPARIN SODIUM 7500 UNIT(S): 5000 INJECTION INTRAVENOUS; SUBCUTANEOUS at 15:12

## 2022-11-10 NOTE — BRIEF OPERATIVE NOTE - NSICDXBRIEFPROCEDURE_GEN_ALL_CORE_FT
PROCEDURES:  Bronchoscopy, flexible, by CT surgery 10-Nov-2022 13:26:05  Pernell Brennan  VATS, with pleural biopsy 10-Nov-2022 13:27:47 Right Pernell Brennan, with partial lung decortication 10-Nov-2022 13:28:12 RLL Pernell Brennna, with chest tube insertion for drainage of pleural effusion 10-Nov-2022 13:28:50  Pernell Brennan

## 2022-11-10 NOTE — ASU PREOP CHECKLIST - COMMENTS
pt took prograf, tracolimus, bactrim, metoprolol, gabapentin, clonidine, hydralazine, asa at 0400 with sips water

## 2022-11-10 NOTE — CONSULT NOTE ADULT - SUBJECTIVE AND OBJECTIVE BOX
ENDOCRINE INITIAL CONSULT -     HPI:  57 yo male with preop dx. pleural effusion presents to pre surgical testing.  Pt with h/o pleural effusion s/p thoracentesis 6/2022 and 8/2022.  Pt is scheduled for right video assisted thoracoscopy possible thoracotomy, possible talc pleurodesis, possible pleux, possible decortication.   (01 Nov 2022 13:08)      ENDOCRINE HISTORY     PAST MEDICAL & SURGICAL HISTORY:  Hypertension      Hyperlipidemia      Diabetes mellitus  Type 1 on insulin pump      CKD (chronic kidney disease)  Renal Transplant 2013 at [facility]      Diabetic peripheral neuropathy      Obesity (BMI 30-39.9)      CVA (cerebral vascular accident)  Wallenberg Stroke  low L body sensation  low R face sensation  decreased peripheral vision  poor balance  2015        Squamous cell carcinoma of skin of left ear  nose      History of DVT (deep vein thrombosis)  LLE 2016, was on Eliquis x 6 months  Was hospitalized for Rhinovirus at the time, intubated      Recurrent squamous cell carcinoma of skin  nose, L arm      History of pleural effusion  right thoracentesis 6/2022 and 8/2022      CARMEN treated with BiPAP  2L O2 at night      History of restrictive lung disease      Toe infection  2007 L 4th Toe surgery-amputation secondary to osteomyelitis  2019 partial right 2nd 4th toe amputation      Ear disease  Left inner ear surgery, 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, Can NOT have MRI      Vasectomy status  s/p b/l  vasectomy      H/O foot surgery  2005 - right foot - bone fracture - s/p ORIF and subsequent removal of hardware      End-stage renal failure with renal transplant  12/2013      S/P kidney transplant  2013      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      History of loop recorder  2015          FAMILY HISTORY:  Family history of diabetes mellitus (DM)    FH: skin cancer        Social History:      Home Medications:  aspirin 81 mg oral tablet: 1 tab(s) orally once a day (10 Nov 2022 06:56)  azaTHIOprine 100 mg oral tablet: 1 tab(s) orally once a day, am (10 Nov 2022 06:56)  Bactrim 400 mg-80 mg oral tablet: 1 tab(s) orally once a day, am (10 Nov 2022 06:56)  cloNIDine 0.1 mg oral tablet: 1 tab(s) orally 2 times a day (10 Nov 2022 06:56)  gabapentin 300 mg oral capsule: 1 tab(s) orally 2 times a day (10 Nov 2022 06:56)  Humalog pump: 3 unit(s) per hour 24hrs/day with 3 bolus with meals.  (10 Nov 2022 06:56)  hydrALAZINE 50 mg oral tablet: 1 tab(s) orally 3 times a day (10 Nov 2022 06:56)  lovastatin 40 mg oral tablet: 1 tab(s) orally once a day, pm (10 Nov 2022 06:56)  metoprolol tartrate 75 mg oral tablet: 1 tab(s) orally 2 times a day (10 Nov 2022 06:56)  tacrolimus: 1.5 milligram(s) orally 2 times a day (10 Nov 2022 06:56)      MEDICATIONS  (STANDING):  aspirin  chewable 81 milliGRAM(s) Oral daily  atorvastatin 10 milliGRAM(s) Oral at bedtime  azaTHIOprine 100 milliGRAM(s) Oral daily  cloNIDine 0.1 milliGRAM(s) Oral every 12 hours  gabapentin 300 milliGRAM(s) Oral two times a day  heparin   Injectable 7500 Unit(s) SubCutaneous every 8 hours  HYDROmorphone PCA (1 mG/mL) 30 milliLiter(s) PCA Continuous PCA Continuous  lactated ringers. 1000 milliLiter(s) (30 mL/Hr) IV Continuous <Continuous>  metoprolol tartrate 75 milliGRAM(s) Oral every 12 hours  polyethylene glycol 3350 17 Gram(s) Oral daily  senna 2 Tablet(s) Oral at bedtime  tacrolimus    0.5 mG/mL Suspension 1.5 milliGRAM(s) Oral two times a day  trimethoprim   80 mG/sulfamethoxazole 400 mG 1 Tablet(s) Oral daily    MEDICATIONS  (PRN):  HYDROmorphone PCA (1 mG/mL) Rescue Clinician Bolus 0.5 milliGRAM(s) IV Push every 15 minutes PRN for Pain Scale GREATER THAN 6  naloxone Injectable 0.1 milliGRAM(s) IV Push every 3 minutes PRN For ANY of the following changes in patient status:  A. RR LESS THAN 10 breaths per minute, B. Oxygen saturation LESS THAN 90%, C. Sedation score of 6  ondansetron Injectable 4 milliGRAM(s) IV Push every 6 hours PRN Nausea      Allergies    No Known Allergies    Intolerances        REVIEW OF SYSTEMS  Constitutional: No fever  Eyes: No blurry vision  Neuro: No tremors  HEENT: No pain  Cardiovascular: No chest pain, palpitations  Respiratory: No SOB, no cough  GI: No nausea, vomiting, abdominal pain  : No dysuria  Skin: no rash  Psych: no depression  Endocrine: no polyuria, polydipsia  Hem/lymph: no swelling  Osteoporosis: no fractures  ALL OTHER SYSTEMS REVIEWED AND NEGATIVE     UNABLE TO OBTAIN     PHYSICAL EXAM   Vital Signs Last 24 Hrs  T(C): 36.5 (10 Nov 2022 12:35), Max: 36.7 (10 Nov 2022 06:36)  T(F): 97.7 (10 Nov 2022 12:35), Max: 98 (10 Nov 2022 06:36)  HR: 70 (10 Nov 2022 13:30) (68 - 72)  BP: 149/72 (10 Nov 2022 13:00) (137/76 - 154/69)  BP(mean): 95 (10 Nov 2022 13:00) (94 - 95)  RR: 15 (10 Nov 2022 13:30) (12 - 21)  SpO2: 92% (10 Nov 2022 13:30) (92% - 99%)      GENERAL: NAD, well-groomed, well-developed  EYES: No proptosis, no lid lag, anicteric  HEENT:  Atraumatic, Normocephalic, moist mucous membranes  THYROID: Normal size, no palpable nodules  RESPIRATORY: Clear to auscultation bilaterally; No rales, rhonchi, wheezing  CARDIOVASCULAR: Regular rate and rhythm; No murmurs; no peripheral edema  GI: Soft, nontender, non distended, normal bowel sounds  SKIN: Dry, intact, No rashes or lesions  MUSCULOSKELETAL: Full range of motion, normal strength  NEURO: sensation intact, extraocular movements intact, no tremor  PSYCH: Alert and oriented x 3, normal affect, normal mood  CUSHING'S SIGNS: no striae    CAPILLARY BLOOD GLUCOSE      POCT Blood Glucose.: 98 mg/dL (10 Nov 2022 06:41)      A1C with Estimated Average Glucose Result: 7.2 % (11-01-22 @ 15:00)  A1C with Estimated Average Glucose Result: 8.6 % (08-13-22 @ 06:01)                      LIPIDS    RADIOLOGY ENDOCRINE INITIAL CONSULT - Dm1/insulin pump     HPI:  57 yo male with preop dx. pleural effusion presents to pre surgical testing.  Pt with h/o pleural effusion s/p thoracentesis 6/2022 and 8/2022.  Pt is scheduled for right video assisted thoracoscopy possible thoracotomy, possible talc pleurodesis, possible pleux, possible decortication.   (01 Nov 2022 13:08)    ENDOCRINE HISTORY   DM1 diagnosed > 20 years ago   follows with endo Dr. Jasbir Garcia   currently managed on medtronic insulin pump & dexcom cgm - took off the pump early in the morning when his sugars were running in the 70s   has a family hx of DM in his grandparents, believes both had DM type 1.   Current pump settings per patient: 3 units/hr for full 24 hours. Does not have set ICR or ISF. He says that he carb counts & boluses using a rough ICR of 1:9/10 (ie. if eating 40 grams of carbs, may bolus 3-4 units) and uses a sliding scale for correction.   DM complicated by CKD/ s/p renal transplant, neuropathy, CVA, PAD s/p amputations  Diet is okay he says.     PAST MEDICAL & SURGICAL HISTORY:  Hypertension      Hyperlipidemia      Diabetes mellitus  Type 1 on insulin pump      CKD (chronic kidney disease)  Renal Transplant 2013 at [facility]      Diabetic peripheral neuropathy      Obesity (BMI 30-39.9)      CVA (cerebral vascular accident)  Wallenberg Stroke  low L body sensation  low R face sensation  decreased peripheral vision  poor balance  2015        Squamous cell carcinoma of skin of left ear  nose      History of DVT (deep vein thrombosis)  LLE 2016, was on Eliquis x 6 months  Was hospitalized for Rhinovirus at the time, intubated      Recurrent squamous cell carcinoma of skin  nose, L arm      History of pleural effusion  right thoracentesis 6/2022 and 8/2022      CARMEN treated with BiPAP  2L O2 at night      History of restrictive lung disease      Toe infection  2007 L 4th Toe surgery-amputation secondary to osteomyelitis  2019 partial right 2nd 4th toe amputation      Ear disease  Left inner ear surgery, 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, Can NOT have MRI      Vasectomy status  s/p b/l  vasectomy      H/O foot surgery  2005 - right foot - bone fracture - s/p ORIF and subsequent removal of hardware      End-stage renal failure with renal transplant  12/2013      S/P kidney transplant  2013      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      History of loop recorder  2015          FAMILY HISTORY:  Family history of diabetes mellitus (DM)    FH: skin cancer        Social History: does not report any toxic habits       Home Medications:  aspirin 81 mg oral tablet: 1 tab(s) orally once a day (10 Nov 2022 06:56)  azaTHIOprine 100 mg oral tablet: 1 tab(s) orally once a day, am (10 Nov 2022 06:56)  Bactrim 400 mg-80 mg oral tablet: 1 tab(s) orally once a day, am (10 Nov 2022 06:56)  cloNIDine 0.1 mg oral tablet: 1 tab(s) orally 2 times a day (10 Nov 2022 06:56)  gabapentin 300 mg oral capsule: 1 tab(s) orally 2 times a day (10 Nov 2022 06:56)  Humalog pump: 3 unit(s) per hour 24hrs/day with 3 bolus with meals.  (10 Nov 2022 06:56)  hydrALAZINE 50 mg oral tablet: 1 tab(s) orally 3 times a day (10 Nov 2022 06:56)  lovastatin 40 mg oral tablet: 1 tab(s) orally once a day, pm (10 Nov 2022 06:56)  metoprolol tartrate 75 mg oral tablet: 1 tab(s) orally 2 times a day (10 Nov 2022 06:56)  tacrolimus: 1.5 milligram(s) orally 2 times a day (10 Nov 2022 06:56)      MEDICATIONS  (STANDING):  aspirin  chewable 81 milliGRAM(s) Oral daily  atorvastatin 10 milliGRAM(s) Oral at bedtime  azaTHIOprine 100 milliGRAM(s) Oral daily  cloNIDine 0.1 milliGRAM(s) Oral every 12 hours  gabapentin 300 milliGRAM(s) Oral two times a day  heparin   Injectable 7500 Unit(s) SubCutaneous every 8 hours  HYDROmorphone PCA (1 mG/mL) 30 milliLiter(s) PCA Continuous PCA Continuous  lactated ringers. 1000 milliLiter(s) (30 mL/Hr) IV Continuous <Continuous>  metoprolol tartrate 75 milliGRAM(s) Oral every 12 hours  polyethylene glycol 3350 17 Gram(s) Oral daily  senna 2 Tablet(s) Oral at bedtime  tacrolimus    0.5 mG/mL Suspension 1.5 milliGRAM(s) Oral two times a day  trimethoprim   80 mG/sulfamethoxazole 400 mG 1 Tablet(s) Oral daily    MEDICATIONS  (PRN):  HYDROmorphone PCA (1 mG/mL) Rescue Clinician Bolus 0.5 milliGRAM(s) IV Push every 15 minutes PRN for Pain Scale GREATER THAN 6  naloxone Injectable 0.1 milliGRAM(s) IV Push every 3 minutes PRN For ANY of the following changes in patient status:  A. RR LESS THAN 10 breaths per minute, B. Oxygen saturation LESS THAN 90%, C. Sedation score of 6  ondansetron Injectable 4 milliGRAM(s) IV Push every 6 hours PRN Nausea      Allergies    No Known Allergies    Intolerances        REVIEW OF SYSTEMS  Constitutional: No fever  Eyes: No blurry vision  Neuro: No tremors  HEENT: No pain  Cardiovascular: No chest pain, palpitations  Respiratory: No SOB, no cough  GI: No nausea, vomiting, abdominal pain  : No dysuria  Skin: no rash  Psych: no depression  Endocrine: no polyuria, polydipsia  Hem/lymph: no swelling  Osteoporosis: no fractures  ALL OTHER SYSTEMS REVIEWED AND NEGATIVE     UNABLE TO OBTAIN     PHYSICAL EXAM   Vital Signs Last 24 Hrs  T(C): 36.5 (10 Nov 2022 12:35), Max: 36.7 (10 Nov 2022 06:36)  T(F): 97.7 (10 Nov 2022 12:35), Max: 98 (10 Nov 2022 06:36)  HR: 70 (10 Nov 2022 13:30) (68 - 72)  BP: 149/72 (10 Nov 2022 13:00) (137/76 - 154/69)  BP(mean): 95 (10 Nov 2022 13:00) (94 - 95)  RR: 15 (10 Nov 2022 13:30) (12 - 21)  SpO2: 92% (10 Nov 2022 13:30) (92% - 99%)      GENERAL: NAD, well-groomed, well-developed  EYES: No proptosis, no lid lag, anicteric  HEENT:  Atraumatic, Normocephalic, moist mucous membranes  THYROID: Normal size, no palpable nodules  RESPIRATORY: Clear to auscultation bilaterally; No rales, rhonchi, wheezing  CARDIOVASCULAR: Regular rate and rhythm; No murmurs; no peripheral edema  GI: Soft, nontender, non distended, normal bowel sounds  SKIN: Dry, intact, No rashes or lesions  MUSCULOSKELETAL: Full range of motion, normal strength  NEURO: sensation intact, extraocular movements intact, no tremor  PSYCH: Alert and oriented x 3, normal affect, normal mood  CUSHING'S SIGNS: no striae    CAPILLARY BLOOD GLUCOSE      POCT Blood Glucose.: 98 mg/dL (10 Nov 2022 06:41)      A1C with Estimated Average Glucose Result: 7.2 % (11-01-22 @ 15:00)  A1C with Estimated Average Glucose Result: 8.6 % (08-13-22 @ 06:01)                      LIPIDS    RADIOLOGY

## 2022-11-10 NOTE — CONSULT NOTE ADULT - ASSESSMENT
DM1, insulin pump  DM1, insulin pump       Lantus 48 units sc stat   Discontinue insulin ggt for 2 hours after lantus is administered  Once off insulin ggt, start Admelog low dose correction scale premeal & low dose correction scale at bedtime   Start Admelog 5 units sc TIDAC -  hold if NOT eating or NPO   FS BG monitoring tidac/hs   Carb consistent diet   BG goal 140-180mg/dL while in ICU   Hypoglycemia protocol   DC Planning: likely back on home medtronic pump + dexcom cgm. Outpatient follow up with patient's private endocrinologist Dr. Garcia. Routine outpatient optho & podiatry evaluations advised.     HTN  Recommendations:   - outpt BP goal < 130/80   - defer to primary team   - outpatient annual urinary microalb/cr ratio    HLD  Recommendations:   - LDL goal < 70   - defer to primary team   - outpatient fasting lipid profile     Discussed with Dr. Jovan Hough, DO   Endocrine Fellow  For follow up questions, discharge recommendations, or new consults please call answering service at [phone] (weekdays), [phone] (nights/weekends). For nonurgent matters, please email [email or web address] or [email or web address]      57yo M w/ DM1 (on insulin pump) complicated by CKD/ESRD s/p renal transplant, CVA, neuropathy, PAD s/p amputations, Obesity, HTN, HLD, chronic lung disease/pleural effusion currently admitted for thoracic surgery, being monitored in CTICU. POD 0. Endocrine consulted for assistance with inpatient management of DM1/insulin pump.     #DM1 with Complications   #insulin pump status   A1c 7.2%   Recommendations:   continue off home insulin pump for now - has supplies but patient says that he would like to remain off for today  Currently no orders for insulin ggt - noted to be running at 2 units/hr earlier when patient was seen   Recommend to transition off insulin ggt to subQ insulin  Lantus 48 units sc stat   Discontinue insulin ggt for 2 hours after lantus is administered  Once off insulin ggt, start Admelog low dose correction scale premeal & low dose correction scale at bedtime   Start Admelog 5 units sc TIDAC -  hold if NOT eating or NPO   FS BG monitoring q1h while on insulin ggt, then tidac/hs if eating or q6h if npo   Carb consistent diet   BG goal 140-180mg/dL while in ICU   Hypoglycemia protocol   DC Planning: likely back on home medtronic pump + dexcom cgm. Outpatient follow up with patient's private endocrinologist Dr. Jasbir Garcia. Routine outpatient optho & podiatry evaluations advised. Please ensure that patient has DM supplies, ketone strips, glucagon prior to discharge. Patient should obtain medic ID or medical alert device.     #HTN  Recommendations:   - outpt BP goal < 130/80   - defer to primary team   - outpatient annual urinary microalb/cr ratio    #HLD  Recommendations:   - LDL goal < 70   - defer to primary team   - outpatient fasting lipid profile     Discussed with Dr. Jovan Hough, DO   Endocrine Fellow  For follow up questions, discharge recommendations, or new consults please call answering service at [phone] (weekdays), [phone] (nights/weekends). For nonurgent matters, please email [email or web address] or [email or web address]

## 2022-11-10 NOTE — PROGRESS NOTE ADULT - SUBJECTIVE AND OBJECTIVE BOX
CHIEF COMPLAINT: FOLLOW UP IN ICU FOR POSTOPERATIVE CARE OF PATIENT WHO IS S/P DECORTICATION      PROCEDURES: Right VATS, drainage of large pleural effusion, pleural biopsy, RLL partial decortication  10-Nov-2022       ISSUES:   Loculated pleural effusion  Post op pain  Chest tube in place  Hx of CKD s/p kidney transplant (2013) on tacrolimus and azathioprine   DM1 on insulin pump  Morbid obesity  HTN  HLD  CARMEN on BiPAP with 2L O2 at night  Hx of squamous cell carcinoma of skin of nose, L arm, and L ear  Hx of CVA (2015)    Diabetic peripheral neuropathy  Hx of DVT of LLE s/p Eliquis (2016)      INTERVAL EVENTS:   OR today. Extubated in OR. Transferred to CTICU.      HISTORY:   Patient reports moderate pain at chest wall incision sites which is worse with coughing and deep breathing without associated fever or dyspnea. Pain is improved with use of pain meds.     PHYSICAL EXAM:   Gen: Comfortable, No acute distress  Eyes: Sclera white, Conjunctiva normal, Eyelids normal, Pupils symmetrical   ENT: Mucous membranes moist,  ,  ,    Neck: Trachea midline,  ,  ,  ,  ,  ,    CV: Rate regular, Rhythm regular,  ,  ,    Resp: Breath sounds clear, No accessory muscles use, Two R chest tubes,  ,    Abd: Soft, Non-distended, Non-tender, Bowel sounds normal,  ,  ,    Skin: Warm, No peripheral edema of lower extremities,  ,    : Benitez in place  Neuro: Moving all 4 extremities,    Psych: A&Ox3      ASSESSMENT AND PLAN:     NEURO:  Post-operative Pain - Stable. Pain control with PCA and Tylenol IV PRN.        Hx of CVA - stable. Continue aspirin.        RESPIRATORY:  Hypoxia - Wean nasal cannula for goal O2sat above 92. Obtain CXR. Incentive spirometry. Chest PT and frequent suctioning. Continue bronchodilators. OOB to chair & ambulate w/ assistance. Continuous pulse oximetry for support & to prevent decompensation.       Loculated Pleural effusion - improved s/p decortication. Monitor chest tube. Monitor CXR.   Chest tube – Pleurevac regulated suctioning. Monitor chest tube output.          CARMEN - stable. Monitor nocturnal O2sat.         CARDIOVASCULAR:  Hemodynamically stable - Not on pressors. Continue hemodynamic monitoring.  Telemetry (medical test) - Reviewed by me today independently. Normal sinus rhythm.  HTN - stable. Continue home antihypertensives medications.         RENAL:  CKD s/p Kidney Transplant – Stable. Continue immunosuppressants. Renally dose medications. Monitor for hyperkalemia and uremia. Avoid nephrotoxic medications. Monitor IOs and electrolytes.          GASTROINTESTINAL:  GI prophylaxis not indicated  Zofran and Reglan IV PRN for nausea  Regular consistency diet             HEMATOLOGIC:  No signs of active bleeding. Monitor Hgb in CBC in AM  DVT prophylaxis with heparin subQ and SCDs.          INFECTIOUS DISEASE:  All surgical sites appear clean. No signs of active infection. Will monitor for fever and leukocytosis.     Cefotetan for diabetic wound prophylaxis        ENDOCRINE:  Hyperglycemia – Insulin gtt. Monitor glucose fingersticks for goal 120-180. Carb control diet. On insulin pump at home. Endocrine consult.          Pertinent clinical, laboratory, radiographic, hemodynamic, echocardiographic, respiratory data, microbiologic data and chart were reviewed by myself and analyzed frequently throughout the course of the day and night by myself.    Plan discussed at length with the CTICU staff and Attending CT Surgeon -   Dr Donte Kaur.      Patient's status was discussed with patient at bedside.     	  ________________________________________________    _________________________  VITAL SIGNS:  Vital Signs Last 24 Hrs  T(C): 36.8 (10 Nov 2022 16:00), Max: 36.8 (10 Nov 2022 16:00)  T(F): 98.2 (10 Nov 2022 16:00), Max: 98.2 (10 Nov 2022 16:00)  HR: 80 (10 Nov 2022 19:00) (68 - 80)  BP: 149/72 (10 Nov 2022 13:00) (137/76 - 154/69)  BP(mean): 95 (10 Nov 2022 13:00) (94 - 95)  RR: 14 (10 Nov 2022 19:00) (10 - 21)  SpO2: 93% (10 Nov 2022 19:00) (92% - 99%)    Parameters below as of 10 Nov 2022 20:00  Patient On (Oxygen Delivery Method): nasal cannula w/ humidification      I/Os:   I&O's Detail    10 Nov 2022 07:01  -  10 Nov 2022 19:49  --------------------------------------------------------  IN:    Lactated Ringers: 360 mL  Total IN: 360 mL    OUT:    Chest Tube (mL): 10 mL    Chest Tube (mL): 270 mL    Indwelling Catheter - Urethral (mL): 545 mL  Total OUT: 825 mL    Total NET: -465 mL              MEDICATIONS:  MEDICATIONS  (STANDING):  albumin human  5% IVPB 250 milliLiter(s) IV Intermittent once  aspirin  chewable 81 milliGRAM(s) Oral daily  atorvastatin 10 milliGRAM(s) Oral at bedtime  azaTHIOprine 100 milliGRAM(s) Oral daily  cefoTEtan  IVPB 2 Gram(s) IV Intermittent every 12 hours  dextrose 50% Injectable 25 Gram(s) IV Push once  gabapentin 300 milliGRAM(s) Oral two times a day  heparin   Injectable 7500 Unit(s) SubCutaneous every 8 hours  HYDROmorphone PCA (1 mG/mL) 30 milliLiter(s) PCA Continuous PCA Continuous  insulin glargine Injectable (LANTUS) 48 Unit(s) SubCutaneous once  insulin lispro (ADMELOG) corrective regimen sliding scale   SubCutaneous three times a day before meals  insulin lispro (ADMELOG) corrective regimen sliding scale   SubCutaneous at bedtime  insulin lispro Injectable (ADMELOG) 5 Unit(s) SubCutaneous before breakfast  insulin lispro Injectable (ADMELOG) 5 Unit(s) SubCutaneous before lunch  insulin lispro Injectable (ADMELOG) 5 Unit(s) SubCutaneous before dinner  lactated ringers. 1000 milliLiter(s) (30 mL/Hr) IV Continuous <Continuous>  polyethylene glycol 3350 17 Gram(s) Oral daily  senna 2 Tablet(s) Oral at bedtime  tacrolimus 1.5 milliGRAM(s) Oral every 12 hours  trimethoprim   80 mG/sulfamethoxazole 400 mG 1 Tablet(s) Oral daily    MEDICATIONS  (PRN):  HYDROmorphone PCA (1 mG/mL) Rescue Clinician Bolus 0.5 milliGRAM(s) IV Push every 15 minutes PRN for Pain Scale GREATER THAN 6  naloxone Injectable 0.1 milliGRAM(s) IV Push every 3 minutes PRN For ANY of the following changes in patient status:  A. RR LESS THAN 10 breaths per minute, B. Oxygen saturation LESS THAN 90%, C. Sedation score of 6  ondansetron Injectable 4 milliGRAM(s) IV Push every 6 hours PRN Nausea      LABS:  Laboratory data was independently reviewed by me today.     ABG - ( 10 Nov 2022 10:43 )  pH, Arterial: 7.38  pH, Blood: x     /  pCO2: 51    /  pO2: 98    / HCO3: 30    / Base Excess: 4.1   /  SaO2: 95.9      Pre-op 11/1/22 - Cr 1.05, Hgb 13.7            RADIOLOGY:   Radiology images were independently reviewed by me today. Reports were reviewed by me today.    Xray Chest 1 View- PORTABLE-Urgent:   ACC: [id number] EXAM:  XR CHEST PORTABLE URGENT 1V                          PROCEDURE DATE:  11/10/2022          INTERPRETATION:  CLINICAL INFORMATION: Cough; post right thoracotomy    TIME OF EXAMINATION: November 10 at 2:10 PM    EXAM: Portable chest    FINDINGS:  Right-sided chest tubes are present. Evacuation of moderate right pleural   effusion. No pneumothorax. Visible lungs are clear although low lung   volume.        COMPARISON: October 3        IMPRESSION: Status post right thoracotomy with decreased pleural   effusion, no complicating pneumothorax and 2 right-sided chest tubes.    --- End of Report ---            ANTONIO BUSTAMANTE MD; Attending Radiologist  This document has been electronically signed. Nov 10 2022  2:23PM (11-10-22 @ 14:22)

## 2022-11-10 NOTE — ASU PREOP CHECKLIST - 3.
left side weakness, poor balance, no sense of temperature on left side left side weakness, poor balance, no sense of temperature on left side / right eye slight droop

## 2022-11-10 NOTE — BRIEF OPERATIVE NOTE - OPERATION/FINDINGS
Flexible bronchoscopy.    Right VATS, drainage of large pleural effusion, pleural biopsy, RLL partial decortication.    28 Fr. CT x2

## 2022-11-10 NOTE — INPATIENT CERTIFICATION FOR MEDICARE PATIENTS - RISKS OF ADVERSE EVENTS
Concern for cardiopulmonary deterioration/Concern for worsening infectious process
Concern for cardiopulmonary deterioration/Concern for delay in diagnosis and treatment

## 2022-11-10 NOTE — CONSULT NOTE ADULT - ATTENDING COMMENTS
Type 1 DM uses insulin pump at home s/p thoracic surgery now in CTICU on insulin drip. Transition with Lantus and overlap with drip x 2 hours. Proceed with basal and bolus plan as outlined. Monitor po intake. Will follow. High risk patient high level decision making.    Cathryn Hyatt MD  Division of Endocrinology  Pager: [phone]    If after 6PM or before 9AM, or on weekends/holidays, please call endocrine answering service for assistance ([phone]).  For nonurgent matters email [email or web address] for assistance.

## 2022-11-11 LAB
ANION GAP SERPL CALC-SCNC: 10 MMOL/L — SIGNIFICANT CHANGE UP (ref 7–14)
BUN SERPL-MCNC: 20 MG/DL — SIGNIFICANT CHANGE UP (ref 7–23)
CALCIUM SERPL-MCNC: 8.9 MG/DL — SIGNIFICANT CHANGE UP (ref 8.4–10.5)
CHLORIDE SERPL-SCNC: 98 MMOL/L — SIGNIFICANT CHANGE UP (ref 98–107)
CO2 SERPL-SCNC: 27 MMOL/L — SIGNIFICANT CHANGE UP (ref 22–31)
CREAT SERPL-MCNC: 1.09 MG/DL — SIGNIFICANT CHANGE UP (ref 0.5–1.3)
EGFR: 80 ML/MIN/1.73M2 — SIGNIFICANT CHANGE UP
GLUCOSE BLDC GLUCOMTR-MCNC: 236 MG/DL — HIGH (ref 70–99)
GLUCOSE BLDC GLUCOMTR-MCNC: 285 MG/DL — HIGH (ref 70–99)
GLUCOSE BLDC GLUCOMTR-MCNC: 303 MG/DL — HIGH (ref 70–99)
GLUCOSE BLDC GLUCOMTR-MCNC: 304 MG/DL — HIGH (ref 70–99)
GLUCOSE SERPL-MCNC: 196 MG/DL — HIGH (ref 70–99)
HCT VFR BLD CALC: 39.7 % — SIGNIFICANT CHANGE UP (ref 39–50)
HGB BLD-MCNC: 12.4 G/DL — LOW (ref 13–17)
MAGNESIUM SERPL-MCNC: 1.9 MG/DL — SIGNIFICANT CHANGE UP (ref 1.6–2.6)
MCHC RBC-ENTMCNC: 27.7 PG — SIGNIFICANT CHANGE UP (ref 27–34)
MCHC RBC-ENTMCNC: 31.2 GM/DL — LOW (ref 32–36)
MCV RBC AUTO: 88.6 FL — SIGNIFICANT CHANGE UP (ref 80–100)
NRBC # BLD: 0 /100 WBCS — SIGNIFICANT CHANGE UP (ref 0–0)
NRBC # FLD: 0 K/UL — SIGNIFICANT CHANGE UP (ref 0–0)
PHOSPHATE SERPL-MCNC: 3.6 MG/DL — SIGNIFICANT CHANGE UP (ref 2.5–4.5)
PLATELET # BLD AUTO: 219 K/UL — SIGNIFICANT CHANGE UP (ref 150–400)
POTASSIUM SERPL-MCNC: 4.6 MMOL/L — SIGNIFICANT CHANGE UP (ref 3.5–5.3)
POTASSIUM SERPL-SCNC: 4.6 MMOL/L — SIGNIFICANT CHANGE UP (ref 3.5–5.3)
RBC # BLD: 4.48 M/UL — SIGNIFICANT CHANGE UP (ref 4.2–5.8)
RBC # FLD: 14.1 % — SIGNIFICANT CHANGE UP (ref 10.3–14.5)
SODIUM SERPL-SCNC: 135 MMOL/L — SIGNIFICANT CHANGE UP (ref 135–145)
WBC # BLD: 10.43 K/UL — SIGNIFICANT CHANGE UP (ref 3.8–10.5)
WBC # FLD AUTO: 10.43 K/UL — SIGNIFICANT CHANGE UP (ref 3.8–10.5)

## 2022-11-11 PROCEDURE — 99233 SBSQ HOSP IP/OBS HIGH 50: CPT

## 2022-11-11 PROCEDURE — 71045 X-RAY EXAM CHEST 1 VIEW: CPT | Mod: 26

## 2022-11-11 RX ORDER — ACETAMINOPHEN 500 MG
1000 TABLET ORAL ONCE
Refills: 0 | Status: COMPLETED | OUTPATIENT
Start: 2022-11-11 | End: 2022-11-11

## 2022-11-11 RX ORDER — INSULIN LISPRO 100/ML
8 VIAL (ML) SUBCUTANEOUS
Refills: 0 | Status: DISCONTINUED | OUTPATIENT
Start: 2022-11-11 | End: 2022-11-12

## 2022-11-11 RX ORDER — DEXTROSE 50 % IN WATER 50 %
12.5 SYRINGE (ML) INTRAVENOUS ONCE
Refills: 0 | Status: DISCONTINUED | OUTPATIENT
Start: 2022-11-11 | End: 2022-11-12

## 2022-11-11 RX ORDER — INSULIN GLARGINE 100 [IU]/ML
58 INJECTION, SOLUTION SUBCUTANEOUS AT BEDTIME
Refills: 0 | Status: DISCONTINUED | OUTPATIENT
Start: 2022-11-11 | End: 2022-11-12

## 2022-11-11 RX ORDER — INSULIN LISPRO 100/ML
VIAL (ML) SUBCUTANEOUS
Refills: 0 | Status: DISCONTINUED | OUTPATIENT
Start: 2022-11-11 | End: 2022-11-11

## 2022-11-11 RX ORDER — INSULIN LISPRO 100/ML
5 VIAL (ML) SUBCUTANEOUS
Refills: 0 | Status: DISCONTINUED | OUTPATIENT
Start: 2022-11-11 | End: 2022-11-11

## 2022-11-11 RX ORDER — LIDOCAINE 4 G/100G
1 CREAM TOPICAL EVERY 24 HOURS
Refills: 0 | Status: COMPLETED | OUTPATIENT
Start: 2022-11-11 | End: 2022-11-15

## 2022-11-11 RX ORDER — INSULIN LISPRO 100/ML
VIAL (ML) SUBCUTANEOUS AT BEDTIME
Refills: 0 | Status: DISCONTINUED | OUTPATIENT
Start: 2022-11-11 | End: 2022-11-11

## 2022-11-11 RX ORDER — METOPROLOL TARTRATE 50 MG
25 TABLET ORAL
Refills: 0 | Status: DISCONTINUED | OUTPATIENT
Start: 2022-11-11 | End: 2022-11-12

## 2022-11-11 RX ORDER — DEXTROSE 50 % IN WATER 50 %
25 SYRINGE (ML) INTRAVENOUS ONCE
Refills: 0 | Status: DISCONTINUED | OUTPATIENT
Start: 2022-11-11 | End: 2022-11-11

## 2022-11-11 RX ORDER — DEXTROSE 50 % IN WATER 50 %
15 SYRINGE (ML) INTRAVENOUS ONCE
Refills: 0 | Status: DISCONTINUED | OUTPATIENT
Start: 2022-11-11 | End: 2022-11-12

## 2022-11-11 RX ORDER — INSULIN LISPRO 100/ML
VIAL (ML) SUBCUTANEOUS
Refills: 0 | Status: DISCONTINUED | OUTPATIENT
Start: 2022-11-11 | End: 2022-11-15

## 2022-11-11 RX ORDER — SODIUM CHLORIDE 9 MG/ML
1000 INJECTION, SOLUTION INTRAVENOUS
Refills: 0 | Status: DISCONTINUED | OUTPATIENT
Start: 2022-11-11 | End: 2022-11-12

## 2022-11-11 RX ORDER — DEXTROSE 50 % IN WATER 50 %
25 SYRINGE (ML) INTRAVENOUS ONCE
Refills: 0 | Status: DISCONTINUED | OUTPATIENT
Start: 2022-11-11 | End: 2022-11-15

## 2022-11-11 RX ORDER — HYDRALAZINE HCL 50 MG
50 TABLET ORAL THREE TIMES A DAY
Refills: 0 | Status: DISCONTINUED | OUTPATIENT
Start: 2022-11-11 | End: 2022-11-15

## 2022-11-11 RX ORDER — INSULIN LISPRO 100/ML
VIAL (ML) SUBCUTANEOUS AT BEDTIME
Refills: 0 | Status: DISCONTINUED | OUTPATIENT
Start: 2022-11-11 | End: 2022-11-15

## 2022-11-11 RX ORDER — DEXTROSE 50 % IN WATER 50 %
25 SYRINGE (ML) INTRAVENOUS ONCE
Refills: 0 | Status: DISCONTINUED | OUTPATIENT
Start: 2022-11-11 | End: 2022-11-12

## 2022-11-11 RX ORDER — GLUCAGON INJECTION, SOLUTION 0.5 MG/.1ML
1 INJECTION, SOLUTION SUBCUTANEOUS ONCE
Refills: 0 | Status: DISCONTINUED | OUTPATIENT
Start: 2022-11-11 | End: 2022-11-12

## 2022-11-11 RX ADMIN — Medication 50 MILLIGRAM(S): at 21:52

## 2022-11-11 RX ADMIN — INSULIN GLARGINE 58 UNIT(S): 100 INJECTION, SOLUTION SUBCUTANEOUS at 21:54

## 2022-11-11 RX ADMIN — LIDOCAINE 1 PATCH: 4 CREAM TOPICAL at 08:00

## 2022-11-11 RX ADMIN — TACROLIMUS 1.5 MILLIGRAM(S): 5 CAPSULE ORAL at 09:55

## 2022-11-11 RX ADMIN — Medication 2: at 08:06

## 2022-11-11 RX ADMIN — Medication 400 MILLIGRAM(S): at 16:30

## 2022-11-11 RX ADMIN — TACROLIMUS 1.5 MILLIGRAM(S): 5 CAPSULE ORAL at 21:51

## 2022-11-11 RX ADMIN — Medication 1000 MILLIGRAM(S): at 17:00

## 2022-11-11 RX ADMIN — Medication 1000 MILLIGRAM(S): at 10:45

## 2022-11-11 RX ADMIN — Medication 5 UNIT(S): at 11:47

## 2022-11-11 RX ADMIN — AZATHIOPRINE 100 MILLIGRAM(S): 100 TABLET ORAL at 11:42

## 2022-11-11 RX ADMIN — LIDOCAINE 1 PATCH: 4 CREAM TOPICAL at 20:27

## 2022-11-11 RX ADMIN — Medication 4: at 21:58

## 2022-11-11 RX ADMIN — Medication 8: at 17:10

## 2022-11-11 RX ADMIN — Medication 8 UNIT(S): at 17:09

## 2022-11-11 RX ADMIN — Medication 400 MILLIGRAM(S): at 10:15

## 2022-11-11 RX ADMIN — Medication 100 GRAM(S): at 18:12

## 2022-11-11 RX ADMIN — Medication 5 UNIT(S): at 08:09

## 2022-11-11 RX ADMIN — LIDOCAINE 1 PATCH: 4 CREAM TOPICAL at 14:08

## 2022-11-11 RX ADMIN — HEPARIN SODIUM 7500 UNIT(S): 5000 INJECTION INTRAVENOUS; SUBCUTANEOUS at 21:54

## 2022-11-11 RX ADMIN — SODIUM CHLORIDE 30 MILLILITER(S): 9 INJECTION, SOLUTION INTRAVENOUS at 20:35

## 2022-11-11 RX ADMIN — HEPARIN SODIUM 7500 UNIT(S): 5000 INJECTION INTRAVENOUS; SUBCUTANEOUS at 05:07

## 2022-11-11 RX ADMIN — Medication 1 TABLET(S): at 11:43

## 2022-11-11 RX ADMIN — GABAPENTIN 300 MILLIGRAM(S): 400 CAPSULE ORAL at 05:07

## 2022-11-11 RX ADMIN — HYDROMORPHONE HYDROCHLORIDE 30 MILLILITER(S): 2 INJECTION INTRAMUSCULAR; INTRAVENOUS; SUBCUTANEOUS at 20:35

## 2022-11-11 RX ADMIN — Medication 25 MILLIGRAM(S): at 18:32

## 2022-11-11 RX ADMIN — Medication 0.1 MILLIGRAM(S): at 17:08

## 2022-11-11 RX ADMIN — Medication 81 MILLIGRAM(S): at 11:42

## 2022-11-11 RX ADMIN — Medication 100 GRAM(S): at 05:07

## 2022-11-11 RX ADMIN — Medication 0.1 MILLIGRAM(S): at 07:42

## 2022-11-11 RX ADMIN — Medication 3: at 11:46

## 2022-11-11 RX ADMIN — HEPARIN SODIUM 7500 UNIT(S): 5000 INJECTION INTRAVENOUS; SUBCUTANEOUS at 14:11

## 2022-11-11 RX ADMIN — GABAPENTIN 300 MILLIGRAM(S): 400 CAPSULE ORAL at 17:08

## 2022-11-11 RX ADMIN — SODIUM CHLORIDE 30 MILLILITER(S): 9 INJECTION, SOLUTION INTRAVENOUS at 07:29

## 2022-11-11 RX ADMIN — ATORVASTATIN CALCIUM 10 MILLIGRAM(S): 80 TABLET, FILM COATED ORAL at 21:52

## 2022-11-11 RX ADMIN — LIDOCAINE 1 PATCH: 4 CREAM TOPICAL at 07:00

## 2022-11-11 RX ADMIN — Medication 6 MILLIGRAM(S): at 21:52

## 2022-11-11 NOTE — PROGRESS NOTE ADULT - ASSESSMENT
55yo M w/ DM1 (on insulin pump) complicated by CKD/ESRD s/p renal transplant, CVA, neuropathy, PAD s/p amputations, Obesity, HTN, HLD, chronic lung disease/pleural effusion currently admitted for thoracic surgery, being monitored in CTICU. POD 0. Endocrine consulted for assistance with inpatient management of DM1/insulin pump. Patient is high risk and high level decision making, requiring ICU level of care.    #DM1 with Complications   #insulin pump status   A1c 7.2%   Recommendations:   continue off home insulin pump for now - has supplies but patient says that he would like to remain off for today  BG are above goal of 140-180 mg/dl.    Increase Lantus to 58 units qhs, please place standing nightly order  Increase to moderate Admelog correction scale premeal & moderate dose correction scale at bedtime   Increase Admelog to 8 units sc TIDAC -  hold if NOT eating or NPO   FS BG monitoring tidac/hs if eating or q6h if npo   Carb consistent diet   BG goal 140-180mg/dL while in ICU   Hypoglycemia protocol   DC Planning: likely back on home medtronic pump + dexcom cgm. Outpatient follow up with patient's private endocrinologist Dr. Jasbir Garcia. Routine outpatient optho & podiatry evaluations advised. Please ensure that patient has DM supplies, ketone strips, glucagon prior to discharge. Patient should obtain medic ID or medical alert device.     #HTN  Recommendations:   - outpt BP goal < 130/80   - defer to primary team   - outpatient annual urinary microalb/cr ratio    #HLD  Recommendations:   - LDL goal < 70   - defer to primary team   - outpatient fasting lipid profile     Insulin recommendations communicated to CTICU provider.    Cathryn Hyatt MD  Division of Endocrinology  Pager: [phone]    If after 6PM or before 9AM, or on weekends/holidays, please call endocrine answering service for assistance ([phone]).  For nonurgent matters email [email or web address] for assistance.

## 2022-11-11 NOTE — PROGRESS NOTE ADULT - SUBJECTIVE AND OBJECTIVE BOX
POST ANESTHESIA EVALUATION    56y Male POSTOP DAY 1    MENTAL STATUS: Patient participation [x  ] Awake     [  ] Arousable     [  ] Sedated    AIRWAY PATENCY: [  x] Satisfactory  [  ] Other:     Vital Signs Last 24 Hrs  T(C): 36.9 (11 Nov 2022 08:00), Max: 37.1 (11 Nov 2022 04:00)  T(F): 98.5 (11 Nov 2022 08:00), Max: 98.7 (11 Nov 2022 04:00)  HR: 83 (11 Nov 2022 10:00) (70 - 100)  BP: 159/72 (11 Nov 2022 10:00) (124/58 - 159/72)  BP(mean): 96 (11 Nov 2022 10:00) (3 - 96)  RR: 12 (11 Nov 2022 10:00) (10 - 24)  SpO2: 99% (11 Nov 2022 10:00) (90% - 100%)    Parameters below as of 11 Nov 2022 10:00  Patient On (Oxygen Delivery Method): nasal cannula w/ humidification  O2 Flow (L/min): 3    I&O's Summary    10 Nov 2022 07:01  -  11 Nov 2022 07:00  --------------------------------------------------------  IN: 930 mL / OUT: 1830 mL / NET: -900 mL    11 Nov 2022 07:01  -  11 Nov 2022 10:38  --------------------------------------------------------  IN: 90 mL / OUT: 130 mL / NET: -40 mL          NAUSEA/ VOMITTING:  [ x ] NONE  [  ] CONTROLLED [  ] OTHER     PAIN: [ x ] CONTROLLED WITH CURRENT REGIMEN  [  ] OTHER    [ x ] NO APPARENT ANESTHESIA COMPLICATIONS      Comments:

## 2022-11-11 NOTE — PROGRESS NOTE ADULT - SUBJECTIVE AND OBJECTIVE BOX
Anesthesia Pain Management Service- Attending Addendum    SUBJECTIVE: Pt doing well with IV PCA without problems reported.    Therapy:	  [ X] IV PCA	   [ ] Epidural           [ ] s/p Spinal Opoid              [ ] Postpartum infusion	  [ ] Patient controlled regional anesthesia (PCRA)    [ ] prn Analgesics    Allergies    No Known Allergies    Intolerances      MEDICATIONS  (STANDING):  acetaminophen   IVPB .. 1000 milliGRAM(s) IV Intermittent once  aspirin  chewable 81 milliGRAM(s) Oral daily  atorvastatin 10 milliGRAM(s) Oral at bedtime  azaTHIOprine 100 milliGRAM(s) Oral daily  cefoTEtan  IVPB 2 Gram(s) IV Intermittent every 12 hours  cloNIDine 0.1 milliGRAM(s) Oral two times a day  dextrose 50% Injectable 25 Gram(s) IV Push once  gabapentin 300 milliGRAM(s) Oral two times a day  heparin   Injectable 7500 Unit(s) SubCutaneous every 8 hours  HYDROmorphone PCA (1 mG/mL) 30 milliLiter(s) PCA Continuous PCA Continuous  insulin lispro (ADMELOG) corrective regimen sliding scale   SubCutaneous at bedtime  insulin lispro (ADMELOG) corrective regimen sliding scale   SubCutaneous three times a day before meals  insulin lispro Injectable (ADMELOG) 5 Unit(s) SubCutaneous before breakfast  insulin lispro Injectable (ADMELOG) 5 Unit(s) SubCutaneous before lunch  insulin lispro Injectable (ADMELOG) 5 Unit(s) SubCutaneous before dinner  lactated ringers. 1000 milliLiter(s) (30 mL/Hr) IV Continuous <Continuous>  lidocaine   4% Patch 1 Patch Transdermal every 24 hours  lidocaine   4% Patch 1 Patch Transdermal daily  melatonin 6 milliGRAM(s) Oral at bedtime  polyethylene glycol 3350 17 Gram(s) Oral daily  senna 2 Tablet(s) Oral at bedtime  tacrolimus 1.5 milliGRAM(s) Oral every 12 hours  trimethoprim   80 mG/sulfamethoxazole 400 mG 1 Tablet(s) Oral daily    MEDICATIONS  (PRN):  HYDROmorphone PCA (1 mG/mL) Rescue Clinician Bolus 0.5 milliGRAM(s) IV Push every 15 minutes PRN for Pain Scale GREATER THAN 6  naloxone Injectable 0.1 milliGRAM(s) IV Push every 3 minutes PRN For ANY of the following changes in patient status:  A. RR LESS THAN 10 breaths per minute, B. Oxygen saturation LESS THAN 90%, C. Sedation score of 6  ondansetron Injectable 4 milliGRAM(s) IV Push every 6 hours PRN Nausea      OBJECTIVE:   [X] No new signs     [ ] Other:    Side Effects:  [X ] None			[ ] Other:    Assessment of Catheter Site:		[ ] Intact		[ ] Other:    ASSESSMENT/PLAN  [ X] Continue current therapy    [ ] Therapy changed to:    [ ] IV PCA       [ ] Epidural     [ ] prn Analgesics     Comments:    Note entered after patient seen

## 2022-11-11 NOTE — PROGRESS NOTE ADULT - SUBJECTIVE AND OBJECTIVE BOX
Anesthesia Pain Management Service    SUBJECTIVE: Patient is doing well with IV PCA and no significant problems reported.  Patient states that the IV PCA helps with his pain.  The patient also has a right diabetic foot ulcer that is wrapped in ace bandage.     Pain Scale Score	At rest: _4/10__ 	With Activity: ___ 	[X ] Refer to charted pain scores    THERAPY:    [ ] IV PCA Morphine		[ ] 5 mg/mL	[ ] 1 mg/mL  [X ] IV PCA Hydromorphone	[ ] 5 mg/mL	[X ] 1 mg/mL  [ ] IV PCA Fentanyl		[ ] 50 micrograms/mL    Demand dose __0.2_ lockout __6_ (minutes) Continuous Rate _0__ Total: _2.5_  mg used (in past 24 hours)      MEDICATIONS  (STANDING):  acetaminophen   IVPB .. 1000 milliGRAM(s) IV Intermittent once  acetaminophen   IVPB .. 1000 milliGRAM(s) IV Intermittent once  aspirin  chewable 81 milliGRAM(s) Oral daily  atorvastatin 10 milliGRAM(s) Oral at bedtime  azaTHIOprine 100 milliGRAM(s) Oral daily  cefoTEtan  IVPB 2 Gram(s) IV Intermittent every 12 hours  cloNIDine 0.1 milliGRAM(s) Oral two times a day  dextrose 50% Injectable 25 Gram(s) IV Push once  gabapentin 300 milliGRAM(s) Oral two times a day  heparin   Injectable 7500 Unit(s) SubCutaneous every 8 hours  HYDROmorphone PCA (1 mG/mL) 30 milliLiter(s) PCA Continuous PCA Continuous  insulin lispro (ADMELOG) corrective regimen sliding scale   SubCutaneous at bedtime  insulin lispro (ADMELOG) corrective regimen sliding scale   SubCutaneous three times a day before meals  insulin lispro Injectable (ADMELOG) 5 Unit(s) SubCutaneous before breakfast  insulin lispro Injectable (ADMELOG) 5 Unit(s) SubCutaneous before lunch  insulin lispro Injectable (ADMELOG) 5 Unit(s) SubCutaneous before dinner  lactated ringers. 1000 milliLiter(s) (30 mL/Hr) IV Continuous <Continuous>  lidocaine   4% Patch 1 Patch Transdermal every 24 hours  lidocaine   4% Patch 1 Patch Transdermal daily  melatonin 6 milliGRAM(s) Oral at bedtime  polyethylene glycol 3350 17 Gram(s) Oral daily  senna 2 Tablet(s) Oral at bedtime  tacrolimus 1.5 milliGRAM(s) Oral every 12 hours  trimethoprim   80 mG/sulfamethoxazole 400 mG 1 Tablet(s) Oral daily    MEDICATIONS  (PRN):  HYDROmorphone PCA (1 mG/mL) Rescue Clinician Bolus 0.5 milliGRAM(s) IV Push every 15 minutes PRN for Pain Scale GREATER THAN 6  naloxone Injectable 0.1 milliGRAM(s) IV Push every 3 minutes PRN For ANY of the following changes in patient status:  A. RR LESS THAN 10 breaths per minute, B. Oxygen saturation LESS THAN 90%, C. Sedation score of 6  ondansetron Injectable 4 milliGRAM(s) IV Push every 6 hours PRN Nausea      OBJECTIVE:  Patient is sitting up in chair, with CT x1, PleurX cath x1, and right foot wrapped in ace bandage.    Sedation Score:	[ X] Alert	 [ ] Drowsy 	[ ] Arousable	[ ] Asleep	[ ] Unresponsive    Side Effects:	[X ] None	[ ] Nausea	[ ] Vomiting	[ ] Pruritus  		[ ] Other:    Vital Signs Last 24 Hrs  T(C): 36.9 (11 Nov 2022 08:00), Max: 37.1 (11 Nov 2022 04:00)  T(F): 98.5 (11 Nov 2022 08:00), Max: 98.7 (11 Nov 2022 04:00)  HR: 87 (11 Nov 2022 08:00) (70 - 100)  BP: 147/71 (11 Nov 2022 08:00) (124/58 - 158/50)  BP(mean): 92 (11 Nov 2022 08:00) (3 - 95)  RR: 19 (11 Nov 2022 08:00) (10 - 24)  SpO2: 100% (11 Nov 2022 08:00) (90% - 100%)    Parameters below as of 11 Nov 2022 08:00  Patient On (Oxygen Delivery Method): nasal cannula w/ humidification  O2 Flow (L/min): 3      ASSESSMENT/ PLAN    Therapy to  be:	[ X] Continue   [ ] Discontinued   [ ] Change to prn Analgesics    Documentation and Verification of current medications:   [X] Done	[ ] Not done, not elligible    Comments: Will continue with IV Dilaudid PCA. Added Lidocaine patch  to regimen.   Recommend non-opioid adjuvant analgesics to be used when possible and when allowed by primary surgical team.    Progress Note written now but Patient was seen earlier.

## 2022-11-11 NOTE — PHYSICAL THERAPY INITIAL EVALUATION ADULT - GENERAL OBSERVATIONS, REHAB EVAL
Pt received seated out of bed in a chair , +tele +chest tubes, +IV, +adams ,+O2 ,+dressing on R heel/foot , pt in no apparent distress

## 2022-11-11 NOTE — PROGRESS NOTE ADULT - SUBJECTIVE AND OBJECTIVE BOX
Chief Complaint: Type 1 DM    History: fair po intake  no hypoglycemia events or symptoms  denies other complaints    MEDICATIONS  (STANDING):  acetaminophen   IVPB .. 1000 milliGRAM(s) IV Intermittent once  aspirin  chewable 81 milliGRAM(s) Oral daily  atorvastatin 10 milliGRAM(s) Oral at bedtime  azaTHIOprine 100 milliGRAM(s) Oral daily  cefoTEtan  IVPB 2 Gram(s) IV Intermittent every 12 hours  cloNIDine 0.1 milliGRAM(s) Oral two times a day  dextrose 50% Injectable 25 Gram(s) IV Push once  gabapentin 300 milliGRAM(s) Oral two times a day  heparin   Injectable 7500 Unit(s) SubCutaneous every 8 hours  HYDROmorphone PCA (1 mG/mL) 30 milliLiter(s) PCA Continuous PCA Continuous  insulin lispro (ADMELOG) corrective regimen sliding scale   SubCutaneous at bedtime  insulin lispro (ADMELOG) corrective regimen sliding scale   SubCutaneous three times a day before meals  insulin lispro Injectable (ADMELOG) 5 Unit(s) SubCutaneous before breakfast  insulin lispro Injectable (ADMELOG) 5 Unit(s) SubCutaneous before lunch  insulin lispro Injectable (ADMELOG) 5 Unit(s) SubCutaneous before dinner  lactated ringers. 1000 milliLiter(s) (30 mL/Hr) IV Continuous <Continuous>  lidocaine   4% Patch 1 Patch Transdermal every 24 hours  lidocaine   4% Patch 1 Patch Transdermal daily  melatonin 6 milliGRAM(s) Oral at bedtime  polyethylene glycol 3350 17 Gram(s) Oral daily  senna 2 Tablet(s) Oral at bedtime  tacrolimus 1.5 milliGRAM(s) Oral every 12 hours  trimethoprim   80 mG/sulfamethoxazole 400 mG 1 Tablet(s) Oral daily    MEDICATIONS  (PRN):  HYDROmorphone PCA (1 mG/mL) Rescue Clinician Bolus 0.5 milliGRAM(s) IV Push every 15 minutes PRN for Pain Scale GREATER THAN 6  naloxone Injectable 0.1 milliGRAM(s) IV Push every 3 minutes PRN For ANY of the following changes in patient status:  A. RR LESS THAN 10 breaths per minute, B. Oxygen saturation LESS THAN 90%, C. Sedation score of 6  ondansetron Injectable 4 milliGRAM(s) IV Push every 6 hours PRN Nausea      Allergies    No Known Allergies    Intolerances      Review of Systems:    ALL OTHER SYSTEMS REVIEWED AND NEGATIVE      PHYSICAL EXAM:  VITALS: T(C): 36.6 (11-11-22 @ 12:00)  T(F): 97.8 (11-11-22 @ 12:00), Max: 98.7 (11-11-22 @ 04:00)  HR: 80 (11-11-22 @ 14:00) (76 - 100)  BP: 152/60 (11-11-22 @ 14:00) (124/58 - 173/68)  RR:  (12 - 24)  SpO2:  (89% - 100%)  Wt(kg): --  GENERAL: NAD, well-groomed, well-developed  EYES: No proptosis, no lid lag, anicteric  HEENT:  Atraumatic, Normocephalic, moist mucous membranes  RESPIRATORY: nonlabored respirations, no wheezing  PSYCH: Alert and oriented x 3, normal affect, normal mood    CAPILLARY BLOOD GLUCOSE      POCT Blood Glucose.: 285 mg/dL (11 Nov 2022 11:45)  POCT Blood Glucose.: 236 mg/dL (11 Nov 2022 07:45)  POCT Blood Glucose.: 125 mg/dL (10 Nov 2022 22:58)  POCT Blood Glucose.: 114 mg/dL (10 Nov 2022 20:22)  POCT Blood Glucose.: 118 mg/dL (10 Nov 2022 17:56)  POCT Blood Glucose.: 127 mg/dL (10 Nov 2022 16:39)      11-11    135  |  98  |  20  ----------------------------<  196<H>  4.6   |  27  |  1.09    eGFR: 80    Ca    8.9      11-11  Mg     1.90     11-11  Phos  3.6     11-11        A1C with Estimated Average Glucose Result: 7.2 % (11-01-22 @ 15:00)  A1C with Estimated Average Glucose Result: 8.6 % (08-13-22 @ 06:01)  A1C with Estimated Average Glucose Result: 10.2 % (05-11-22 @ 17:39)  A1C with Estimated Average Glucose Result: 10.4 % (05-10-22 @ 23:22)  A1C with Estimated Average Glucose Result: 9.4 % (02-10-22 @ 12:00)      Thyroid Function Tests:

## 2022-11-11 NOTE — PROGRESS NOTE ADULT - SUBJECTIVE AND OBJECTIVE BOX
LIONMYRTLEKIMMYLUTHER HALE      56y   Male   [id number]         No Known Allergies             Daily     Daily Drug Dosing Weight  Height (cm): 182.9 (10 Nov 2022 06:36)  Weight (kg): 145.1 (10 Nov 2022 06:36)  BMI (kg/m2): 43.4 (10 Nov 2022 06:36)  BSA (m2): 2.6 (10 Nov 2022 06:36)    HPI:  57 yo male with preop dx. pleural effusion presents to pre surgical testing.  Pt with h/o pleural effusion s/p thoracentesis 6/2022 and 8/2022.  Pt is scheduled for right video assisted thoracoscopy possible thoracotomy, possible talc pleurodesis, possible pleux, possible decortication.   (01 Nov 2022 13:08)      CHIEF COMPLAINT: Follow up in ICU  for postoperative care of patient who is s/p Right VATS, drainage of large pleural effusion, pleural biopsy, RLL partial decortication    Procedure: Right VATS, drainage of large pleural effusion, pleural biopsy, RLL partial decortication  10-Nov-2022                        Issues:  Loculated pleural effusion  Post op pain  Chest tube in place  Hx of CKD s/p kidney transplant (2013) on tacrolimus and azathioprine   DM1 on insulin pump  Morbid obesity  HTN  HLD  CARMEN on BiPAP with 2L O2 at night  Hx of squamous cell carcinoma of skin of nose, L arm, and L ear  Hx of CVA (2015)    Diabetic peripheral neuropathy              Hx of DVT of LLE s/p Eliquis (2016)      Postop course:     Patient reports moderate pain at chest wall incision sites which is worse with coughing and deep breathing without associated fever or dyspnea. Pain is improved with use of PCA and  oral pain meds.         Home Medications:  aspirin 81 mg oral tablet: 1 tab(s) orally once a day (10 Nov 2022 06:56)  azaTHIOprine 100 mg oral tablet: 1 tab(s) orally once a day, am (10 Nov 2022 06:56)  Bactrim 400 mg-80 mg oral tablet: 1 tab(s) orally once a day, am (10 Nov 2022 06:56)  cloNIDine 0.1 mg oral tablet: 1 tab(s) orally 2 times a day (10 Nov 2022 06:56)  gabapentin 300 mg oral capsule: 1 tab(s) orally 2 times a day (10 Nov 2022 06:56)  Humalog pump: 3 unit(s) per hour 24hrs/day with 3 bolus with meals.  (10 Nov 2022 06:56)  hydrALAZINE 50 mg oral tablet: 1 tab(s) orally 3 times a day (10 Nov 2022 06:56)  lovastatin 40 mg oral tablet: 1 tab(s) orally once a day, pm (10 Nov 2022 06:56)  metoprolol tartrate 75 mg oral tablet: 1 tab(s) orally 2 times a day (10 Nov 2022 06:56)  tacrolimus: 1.5 milligram(s) orally 2 times a day (10 Nov 2022 06:56)    PAST MEDICAL & SURGICAL HISTORY:  Hypertension      Hyperlipidemia      Diabetes mellitus  Type 1 on insulin pump      CKD (chronic kidney disease)  Renal Transplant 2013 at [facility]      Diabetic peripheral neuropathy      Obesity (BMI 30-39.9)      CVA (cerebral vascular accident)  [place] Stroke  low L body sensation  low R face sensation  decreased peripheral vision  poor balance  2015        Squamous cell carcinoma of skin of left ear  nose      History of DVT (deep vein thrombosis)  LLE 2016, was on Eliquis x 6 months  Was hospitalized for Rhinovirus at the time, intubated      Recurrent squamous cell carcinoma of skin  nose, L arm      History of pleural effusion  right thoracentesis 6/2022 and 8/2022      CARMEN treated with BiPAP  2L O2 at night      History of restrictive lung disease      Toe infection  2007 L 4th Toe surgery-amputation secondary to osteomyelitis  2019 partial right 2nd 4th toe amputation      Ear disease  Left inner ear surgery, 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, Can NOT have MRI      Vasectomy status  s/p b/l  vasectomy      H/O foot surgery  2005 - right foot - bone fracture - s/p ORIF and subsequent removal of hardware      End-stage renal failure with renal transplant  12/2013      S/P kidney transplant  2013      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      History of loop recorder  2015        Vital Signs Last 24 Hrs  T(C): 36.9 (11 Nov 2022 08:00), Max: 37.1 (11 Nov 2022 04:00)  T(F): 98.5 (11 Nov 2022 08:00), Max: 98.7 (11 Nov 2022 04:00)  HR: 83 (11 Nov 2022 10:00) (70 - 100)  BP: 159/72 (11 Nov 2022 10:00) (124/58 - 159/72)  BP(mean): 96 (11 Nov 2022 10:00) (3 - 96)  RR: 12 (11 Nov 2022 10:00) (10 - 24)  SpO2: 99% (11 Nov 2022 10:00) (90% - 100%)    Parameters below as of 11 Nov 2022 10:00  Patient On (Oxygen Delivery Method): nasal cannula w/ humidification  O2 Flow (L/min): 3    I&O's Detail    10 Nov 2022 07:01  -  11 Nov 2022 07:00  --------------------------------------------------------  IN:    Lactated Ringers: 690 mL    Oral Fluid: 240 mL  Total IN: 930 mL    OUT:    Chest Tube (mL): 540 mL    Chest Tube (mL): 30 mL    Indwelling Catheter - Urethral (mL): 1260 mL  Total OUT: 1830 mL    Total NET: -900 mL      11 Nov 2022 07:01  -  11 Nov 2022 11:13  --------------------------------------------------------  IN:    Lactated Ringers: 90 mL  Total IN: 90 mL    OUT:    Chest Tube (mL): 0 mL    Chest Tube (mL): 20 mL    Indwelling Catheter - Urethral (mL): 110 mL  Total OUT: 130 mL    Total NET: -40 mL        CAPILLARY BLOOD GLUCOSE      POCT Blood Glucose.: 236 mg/dL (11 Nov 2022 07:45)  POCT Blood Glucose.: 125 mg/dL (10 Nov 2022 22:58)  POCT Blood Glucose.: 114 mg/dL (10 Nov 2022 20:22)  POCT Blood Glucose.: 118 mg/dL (10 Nov 2022 17:56)  POCT Blood Glucose.: 127 mg/dL (10 Nov 2022 16:39)  POCT Blood Glucose.: 133 mg/dL (10 Nov 2022 14:37)    Home Medications:  aspirin 81 mg oral tablet: 1 tab(s) orally once a day (10 Nov 2022 06:56)  azaTHIOprine 100 mg oral tablet: 1 tab(s) orally once a day, am (10 Nov 2022 06:56)  Bactrim 400 mg-80 mg oral tablet: 1 tab(s) orally once a day, am (10 Nov 2022 06:56)  cloNIDine 0.1 mg oral tablet: 1 tab(s) orally 2 times a day (10 Nov 2022 06:56)  gabapentin 300 mg oral capsule: 1 tab(s) orally 2 times a day (10 Nov 2022 06:56)  Humalog pump: 3 unit(s) per hour 24hrs/day with 3 bolus with meals.  (10 Nov 2022 06:56)  hydrALAZINE 50 mg oral tablet: 1 tab(s) orally 3 times a day (10 Nov 2022 06:56)  lovastatin 40 mg oral tablet: 1 tab(s) orally once a day, pm (10 Nov 2022 06:56)  metoprolol tartrate 75 mg oral tablet: 1 tab(s) orally 2 times a day (10 Nov 2022 06:56)  tacrolimus: 1.5 milligram(s) orally 2 times a day (10 Nov 2022 06:56)    MEDICATIONS  (STANDING):  acetaminophen   IVPB .. 1000 milliGRAM(s) IV Intermittent once  aspirin  chewable 81 milliGRAM(s) Oral daily  atorvastatin 10 milliGRAM(s) Oral at bedtime  azaTHIOprine 100 milliGRAM(s) Oral daily  cefoTEtan  IVPB 2 Gram(s) IV Intermittent every 12 hours  cloNIDine 0.1 milliGRAM(s) Oral two times a day  dextrose 50% Injectable 25 Gram(s) IV Push once  gabapentin 300 milliGRAM(s) Oral two times a day  heparin   Injectable 7500 Unit(s) SubCutaneous every 8 hours  HYDROmorphone PCA (1 mG/mL) 30 milliLiter(s) PCA Continuous PCA Continuous  insulin lispro (ADMELOG) corrective regimen sliding scale   SubCutaneous at bedtime  insulin lispro (ADMELOG) corrective regimen sliding scale   SubCutaneous three times a day before meals  insulin lispro Injectable (ADMELOG) 5 Unit(s) SubCutaneous before breakfast  insulin lispro Injectable (ADMELOG) 5 Unit(s) SubCutaneous before lunch  insulin lispro Injectable (ADMELOG) 5 Unit(s) SubCutaneous before dinner  lactated ringers. 1000 milliLiter(s) (30 mL/Hr) IV Continuous <Continuous>  lidocaine   4% Patch 1 Patch Transdermal every 24 hours  lidocaine   4% Patch 1 Patch Transdermal daily  melatonin 6 milliGRAM(s) Oral at bedtime  polyethylene glycol 3350 17 Gram(s) Oral daily  senna 2 Tablet(s) Oral at bedtime  tacrolimus 1.5 milliGRAM(s) Oral every 12 hours  trimethoprim   80 mG/sulfamethoxazole 400 mG 1 Tablet(s) Oral daily    MEDICATIONS  (PRN):  HYDROmorphone PCA (1 mG/mL) Rescue Clinician Bolus 0.5 milliGRAM(s) IV Push every 15 minutes PRN for Pain Scale GREATER THAN 6  naloxone Injectable 0.1 milliGRAM(s) IV Push every 3 minutes PRN For ANY of the following changes in patient status:  A. RR LESS THAN 10 breaths per minute, B. Oxygen saturation LESS THAN 90%, C. Sedation score of 6  ondansetron Injectable 4 milliGRAM(s) IV Push every 6 hours PRN Nausea        Physical exam:                             General:               Pt is awake, alert,  appears to be in pain but not in distress                                                  Neuro:                  Nonfocal                             Psych:                   A&Ox3                          Cardiovascular:   S1 & S2, regular                          Respiratory:         Air entry is fair and equal on both sides, has bilateral conducted sounds                           GI:                          Soft, nondistended and nontender, Bowel sounds active                            Ext:                        No cyanosis or edema     Labs:                                                                           12.4   10.43 )-----------( 219      ( 11 Nov 2022 04:10 )             39.7             11-11    135  |  98  |  20  ----------------------------<  196<H>  4.6   |  27  |  1.09    Ca    8.9      11 Nov 2022 04:10  Phos  3.6     11-11  Mg     1.90     11-11      CXR:  < from: Xray Chest 1 View- PORTABLE-Urgent (11.10.22 @ 14:22) >  FINDINGS:  Right-sided chest tubes are present. Evacuation of moderate right pleural   effusion. No pneumothorax. Visible lungs are clear although low lung   volume.      COMPARISON: October 3      IMPRESSION: Status post right thoracotomy with decreased pleural   effusion, no complicating pneumothorax and 2 right-sided chest tubes.      Plan:  General: 56yMale s/p Right VATS, drainage of large pleural effusion, pleural biopsy, RLL partial decortication  10-Nov-2022   , experiencing  pain with deep breathing.                             Neuro:                                         Pain control with PCA /  Tylenol PRN                            Cardiovascular:                                          Telemetry (medical test) - Reviewed by me today independently. Normal sinus rhythm .      HTN: Continue hemodynamic monitoring and restart Lopressor  / Hydralazine and clonidine    HLD: On Lipitor                                         Continue hemodynamic monitoring to prevent decompensation.                            Respiratory:                                         Postop hypoxemia requiring O2 via nasal cannula probably due to postop pain - Wean nasal cannula for goal O2sat above 92%.                                              Obtain CXR . Encourage incentive spirometry.                                                   Chest PT and frequent suctioning. Continue bronchodilators, Pulmozyme and inhaled 3% saline inhalations.                                                      OOB to chair & ambulate w/ assistance.                                                           Continuous pulse oximetry for support & to prevent decompensation.                                         Monitor chest tube output                                         Chest tube to suction                                                                                          GI                                         On  DASH / diabetic  diet as tolerated                                         Continue Zofran / Reglan for nausea - PRN                                         Continue bowel regimen	                                                                 Renal:                                              Continue LR  30cc/hr                                         Monitor I/Os and electrolytes                                                                                        Hem/ Onc:                                         DVT prophylaxis with SQ Heparin and SCDs                                         Monitor chest tube output &  signs of bleeding.                                          Follow CBC in AM                           Infectious disease:      Continue Bactrim                                          Monitor for fever / leukocytosis.                                          All surgical incision / chest tube  sites look clean                            Endocrine                                             DM-2 / Hyperglycemia: Continue Accu-Checks with coverage                                           Hypothyroidism: Continue Synthroid       Pertinent clinical, laboratory, radiographic, hemodynamic, echocardiographic, respiratory data, microbiologic data and chart were reviewed and analyzed frequently throughout the course of the day and night.     Patient seen, examined and plan discussed with CT Surgeon   / CTICU team during rounds.    OOB to chair and ambulate with physical therapy as tolerated.     Status discussed with patient and updated plan of care.     I have spent 40 minutes with this patient including 20 minutes of time coordinating care in the ICU.          Vinayak Garcia MD                                                                     LIONMYRTLEKIMMYLUTHER HALE      56y   Male   [id number]         No Known Allergies             Daily     Daily Drug Dosing Weight  Height (cm): 182.9 (10 Nov 2022 06:36)  Weight (kg): 145.1 (10 Nov 2022 06:36)  BMI (kg/m2): 43.4 (10 Nov 2022 06:36)  BSA (m2): 2.6 (10 Nov 2022 06:36)    HPI:  55 yo male with preop dx. pleural effusion presents to pre surgical testing.  Pt with h/o pleural effusion s/p thoracentesis 6/2022 and 8/2022.  Pt is scheduled for right video assisted thoracoscopy possible thoracotomy, possible talc pleurodesis, possible pleux, possible decortication.   (01 Nov 2022 13:08)      CHIEF COMPLAINT: Follow up in ICU  for postoperative care of patient who is s/p Right VATS, drainage of large pleural effusion, pleural biopsy, RLL partial decortication    Procedure: Right VATS, drainage of large pleural effusion, pleural biopsy, RLL partial decortication  10-Nov-2022                        Issues:  Loculated pleural effusion  Post op pain  Chest tube in place  Hx of CKD s/p kidney transplant (2013) on tacrolimus and azathioprine   DM1 on insulin pump  Morbid obesity  HTN  HLD  CARMEN on BiPAP with 2L O2 at night  Hx of squamous cell carcinoma of skin of nose, L arm, and L ear  Hx of CVA (2015)    Diabetic peripheral neuropathy              Hx of DVT of LLE s/p Eliquis (2016)      Postop course:     Patient reports moderate pain at chest wall incision sites which is worse with coughing and deep breathing without associated fever or dyspnea. Pain is improved with use of PCA and  oral pain meds.         Home Medications:  aspirin 81 mg oral tablet: 1 tab(s) orally once a day (10 Nov 2022 06:56)  azaTHIOprine 100 mg oral tablet: 1 tab(s) orally once a day, am (10 Nov 2022 06:56)  Bactrim 400 mg-80 mg oral tablet: 1 tab(s) orally once a day, am (10 Nov 2022 06:56)  cloNIDine 0.1 mg oral tablet: 1 tab(s) orally 2 times a day (10 Nov 2022 06:56)  gabapentin 300 mg oral capsule: 1 tab(s) orally 2 times a day (10 Nov 2022 06:56)  Humalog pump: 3 unit(s) per hour 24hrs/day with 3 bolus with meals.  (10 Nov 2022 06:56)  hydrALAZINE 50 mg oral tablet: 1 tab(s) orally 3 times a day (10 Nov 2022 06:56)  lovastatin 40 mg oral tablet: 1 tab(s) orally once a day, pm (10 Nov 2022 06:56)  metoprolol tartrate 75 mg oral tablet: 1 tab(s) orally 2 times a day (10 Nov 2022 06:56)  tacrolimus: 1.5 milligram(s) orally 2 times a day (10 Nov 2022 06:56)    PAST MEDICAL & SURGICAL HISTORY:  Hypertension      Hyperlipidemia      Diabetes mellitus  Type 1 on insulin pump      CKD (chronic kidney disease)  Renal Transplant 2013 at [facility]      Diabetic peripheral neuropathy      Obesity (BMI 30-39.9)      CVA (cerebral vascular accident)  [place] Stroke  low L body sensation  low R face sensation  decreased peripheral vision  poor balance  2015        Squamous cell carcinoma of skin of left ear  nose      History of DVT (deep vein thrombosis)  LLE 2016, was on Eliquis x 6 months  Was hospitalized for Rhinovirus at the time, intubated      Recurrent squamous cell carcinoma of skin  nose, L arm      History of pleural effusion  right thoracentesis 6/2022 and 8/2022      CARMEN treated with BiPAP  2L O2 at night      History of restrictive lung disease      Toe infection  2007 L 4th Toe surgery-amputation secondary to osteomyelitis  2019 partial right 2nd 4th toe amputation      Ear disease  Left inner ear surgery, 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, Can NOT have MRI      Vasectomy status  s/p b/l  vasectomy      H/O foot surgery  2005 - right foot - bone fracture - s/p ORIF and subsequent removal of hardware      End-stage renal failure with renal transplant  12/2013      S/P kidney transplant  2013      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      History of loop recorder  2015        Vital Signs Last 24 Hrs  T(C): 36.9 (11 Nov 2022 08:00), Max: 37.1 (11 Nov 2022 04:00)  T(F): 98.5 (11 Nov 2022 08:00), Max: 98.7 (11 Nov 2022 04:00)  HR: 83 (11 Nov 2022 10:00) (70 - 100)  BP: 159/72 (11 Nov 2022 10:00) (124/58 - 159/72)  BP(mean): 96 (11 Nov 2022 10:00) (3 - 96)  RR: 12 (11 Nov 2022 10:00) (10 - 24)  SpO2: 99% (11 Nov 2022 10:00) (90% - 100%)    Parameters below as of 11 Nov 2022 10:00  Patient On (Oxygen Delivery Method): nasal cannula w/ humidification  O2 Flow (L/min): 3    I&O's Detail    10 Nov 2022 07:01  -  11 Nov 2022 07:00  --------------------------------------------------------  IN:    Lactated Ringers: 690 mL    Oral Fluid: 240 mL  Total IN: 930 mL    OUT:    Chest Tube (mL): 540 mL    Chest Tube (mL): 30 mL    Indwelling Catheter - Urethral (mL): 1260 mL  Total OUT: 1830 mL    Total NET: -900 mL      11 Nov 2022 07:01  -  11 Nov 2022 11:13  --------------------------------------------------------  IN:    Lactated Ringers: 90 mL  Total IN: 90 mL    OUT:    Chest Tube (mL): 0 mL    Chest Tube (mL): 20 mL    Indwelling Catheter - Urethral (mL): 110 mL  Total OUT: 130 mL    Total NET: -40 mL        CAPILLARY BLOOD GLUCOSE      POCT Blood Glucose.: 236 mg/dL (11 Nov 2022 07:45)  POCT Blood Glucose.: 125 mg/dL (10 Nov 2022 22:58)  POCT Blood Glucose.: 114 mg/dL (10 Nov 2022 20:22)  POCT Blood Glucose.: 118 mg/dL (10 Nov 2022 17:56)  POCT Blood Glucose.: 127 mg/dL (10 Nov 2022 16:39)  POCT Blood Glucose.: 133 mg/dL (10 Nov 2022 14:37)    Home Medications:  aspirin 81 mg oral tablet: 1 tab(s) orally once a day (10 Nov 2022 06:56)  azaTHIOprine 100 mg oral tablet: 1 tab(s) orally once a day, am (10 Nov 2022 06:56)  Bactrim 400 mg-80 mg oral tablet: 1 tab(s) orally once a day, am (10 Nov 2022 06:56)  cloNIDine 0.1 mg oral tablet: 1 tab(s) orally 2 times a day (10 Nov 2022 06:56)  gabapentin 300 mg oral capsule: 1 tab(s) orally 2 times a day (10 Nov 2022 06:56)  Humalog pump: 3 unit(s) per hour 24hrs/day with 3 bolus with meals.  (10 Nov 2022 06:56)  hydrALAZINE 50 mg oral tablet: 1 tab(s) orally 3 times a day (10 Nov 2022 06:56)  lovastatin 40 mg oral tablet: 1 tab(s) orally once a day, pm (10 Nov 2022 06:56)  metoprolol tartrate 75 mg oral tablet: 1 tab(s) orally 2 times a day (10 Nov 2022 06:56)  tacrolimus: 1.5 milligram(s) orally 2 times a day (10 Nov 2022 06:56)    MEDICATIONS  (STANDING):  acetaminophen   IVPB .. 1000 milliGRAM(s) IV Intermittent once  aspirin  chewable 81 milliGRAM(s) Oral daily  atorvastatin 10 milliGRAM(s) Oral at bedtime  azaTHIOprine 100 milliGRAM(s) Oral daily  cefoTEtan  IVPB 2 Gram(s) IV Intermittent every 12 hours  cloNIDine 0.1 milliGRAM(s) Oral two times a day  dextrose 50% Injectable 25 Gram(s) IV Push once  gabapentin 300 milliGRAM(s) Oral two times a day  heparin   Injectable 7500 Unit(s) SubCutaneous every 8 hours  HYDROmorphone PCA (1 mG/mL) 30 milliLiter(s) PCA Continuous PCA Continuous  insulin lispro (ADMELOG) corrective regimen sliding scale   SubCutaneous at bedtime  insulin lispro (ADMELOG) corrective regimen sliding scale   SubCutaneous three times a day before meals  insulin lispro Injectable (ADMELOG) 5 Unit(s) SubCutaneous before breakfast  insulin lispro Injectable (ADMELOG) 5 Unit(s) SubCutaneous before lunch  insulin lispro Injectable (ADMELOG) 5 Unit(s) SubCutaneous before dinner  lactated ringers. 1000 milliLiter(s) (30 mL/Hr) IV Continuous <Continuous>  lidocaine   4% Patch 1 Patch Transdermal every 24 hours  lidocaine   4% Patch 1 Patch Transdermal daily  melatonin 6 milliGRAM(s) Oral at bedtime  polyethylene glycol 3350 17 Gram(s) Oral daily  senna 2 Tablet(s) Oral at bedtime  tacrolimus 1.5 milliGRAM(s) Oral every 12 hours  trimethoprim   80 mG/sulfamethoxazole 400 mG 1 Tablet(s) Oral daily    MEDICATIONS  (PRN):  HYDROmorphone PCA (1 mG/mL) Rescue Clinician Bolus 0.5 milliGRAM(s) IV Push every 15 minutes PRN for Pain Scale GREATER THAN 6  naloxone Injectable 0.1 milliGRAM(s) IV Push every 3 minutes PRN For ANY of the following changes in patient status:  A. RR LESS THAN 10 breaths per minute, B. Oxygen saturation LESS THAN 90%, C. Sedation score of 6  ondansetron Injectable 4 milliGRAM(s) IV Push every 6 hours PRN Nausea        Physical exam:                             General:               Pt is awake, alert,  appears to be in pain but not in distress                                                  Neuro:                  Nonfocal                             Psych:                   A&Ox3                          Cardiovascular:   S1 & S2, regular                          Respiratory:         Air entry is fair and equal on both sides, has bilateral conducted sounds                           GI:                          Soft, nondistended and nontender, Bowel sounds active                            Ext:                        No cyanosis or edema     Labs:                                                                           12.4   10.43 )-----------( 219      ( 11 Nov 2022 04:10 )             39.7             11-11    135  |  98  |  20  ----------------------------<  196<H>  4.6   |  27  |  1.09    Ca    8.9      11 Nov 2022 04:10  Phos  3.6     11-11  Mg     1.90     11-11      CXR:  < from: Xray Chest 1 View- PORTABLE-Urgent (11.10.22 @ 14:22) >  FINDINGS:  Right-sided chest tubes are present. Evacuation of moderate right pleural   effusion. No pneumothorax. Visible lungs are clear although low lung   volume.      COMPARISON: October 3      IMPRESSION: Status post right thoracotomy with decreased pleural   effusion, no complicating pneumothorax and 2 right-sided chest tubes.      Plan:  General: 56yMale s/p Right VATS, drainage of large pleural effusion, pleural biopsy, RLL partial decortication  10-Nov-2022   , experiencing  pain with deep breathing.                             Neuro:                                         Pain control with PCA /  Tylenol PRN                            Cardiovascular:                                          Telemetry (medical test) - Reviewed by me today independently. Normal sinus rhythm .      HTN: Continue hemodynamic monitoring and restart Lopressor  / Hydralazine and clonidine    HLD: On Lipitor                                         Continue hemodynamic monitoring to prevent decompensation.                            Respiratory:                                         Postop hypoxemia requiring O2 via nasal cannula probably due to postop pain - Wean nasal cannula for goal O2sat above 92%.                                              Obtain CXR . Encourage incentive spirometry.                                                   Chest PT and frequent suctioning. Continue bronchodilators, Pulmozyme and inhaled 3% saline inhalations.                                                      OOB to chair & ambulate w/ assistance.                                                           Continuous pulse oximetry for support & to prevent decompensation.                                         Monitor chest tube output                                         Chest tube to suction                                                                                          GI                                         On  DASH / diabetic  diet as tolerated                                         Continue Zofran / Reglan for nausea - PRN                                         Continue bowel regimen	                                                                 Renal:                                         s/p Renal transplant - Continue tacrolimus and azathioprine       Continue LR  30cc/hr                                         Monitor I/Os and electrolytes                                                                                        Hem/ Onc:                                         DVT prophylaxis with SQ Heparin and SCDs                                         Monitor chest tube output &  signs of bleeding.                                          Follow CBC in AM                           Infectious disease:      Continue Bactrim                                          Monitor for fever / leukocytosis.                                          All surgical incision / chest tube  sites look clean                            Endocrine                                             DM-1: Continue Lantus + Humalog,  Accu-Checks with coverage  Endocrine f/u                                                Pertinent clinical, laboratory, radiographic, hemodynamic, echocardiographic, respiratory data, microbiologic data and chart were reviewed and analyzed frequently throughout the course of the day and night.     Patient seen, examined and plan discussed with CT Surgeon Dr. Kaur / CTICU team during rounds.    OOB to chair and ambulate with physical therapy as tolerated.     Status discussed with patient and updated plan of care.     I have spent 40 minutes with this patient including 20 minutes of time coordinating care in the ICU.          Vinayak Garcia MD

## 2022-11-11 NOTE — PHYSICAL THERAPY INITIAL EVALUATION ADULT - PERTINENT HX OF CURRENT PROBLEM, REHAB EVAL
This is a 56y M s/p Right VATS, drainage of large pleural effusion, pleural biopsy, RLL partial decortication  10-Nov-2022. This is a 56y M s/p Right VATS, drainage of large pleural effusion, pleural biopsy, RLL partial decortication .

## 2022-11-12 LAB
ANION GAP SERPL CALC-SCNC: 10 MMOL/L — SIGNIFICANT CHANGE UP (ref 7–14)
BUN SERPL-MCNC: 19 MG/DL — SIGNIFICANT CHANGE UP (ref 7–23)
CALCIUM SERPL-MCNC: 9.3 MG/DL — SIGNIFICANT CHANGE UP (ref 8.4–10.5)
CHLORIDE SERPL-SCNC: 96 MMOL/L — LOW (ref 98–107)
CO2 SERPL-SCNC: 28 MMOL/L — SIGNIFICANT CHANGE UP (ref 22–31)
CREAT SERPL-MCNC: 1.06 MG/DL — SIGNIFICANT CHANGE UP (ref 0.5–1.3)
EGFR: 83 ML/MIN/1.73M2 — SIGNIFICANT CHANGE UP
GLUCOSE BLDC GLUCOMTR-MCNC: 231 MG/DL — HIGH (ref 70–99)
GLUCOSE BLDC GLUCOMTR-MCNC: 233 MG/DL — HIGH (ref 70–99)
GLUCOSE BLDC GLUCOMTR-MCNC: 261 MG/DL — HIGH (ref 70–99)
GLUCOSE BLDC GLUCOMTR-MCNC: 262 MG/DL — HIGH (ref 70–99)
GLUCOSE BLDC GLUCOMTR-MCNC: 307 MG/DL — HIGH (ref 70–99)
GLUCOSE SERPL-MCNC: 232 MG/DL — HIGH (ref 70–99)
HCT VFR BLD CALC: 38.8 % — LOW (ref 39–50)
HGB BLD-MCNC: 12.3 G/DL — LOW (ref 13–17)
MAGNESIUM SERPL-MCNC: 2 MG/DL — SIGNIFICANT CHANGE UP (ref 1.6–2.6)
MCHC RBC-ENTMCNC: 28.3 PG — SIGNIFICANT CHANGE UP (ref 27–34)
MCHC RBC-ENTMCNC: 31.7 GM/DL — LOW (ref 32–36)
MCV RBC AUTO: 89.2 FL — SIGNIFICANT CHANGE UP (ref 80–100)
NRBC # BLD: 0 /100 WBCS — SIGNIFICANT CHANGE UP (ref 0–0)
NRBC # FLD: 0 K/UL — SIGNIFICANT CHANGE UP (ref 0–0)
PHOSPHATE SERPL-MCNC: 2.3 MG/DL — LOW (ref 2.5–4.5)
PLATELET # BLD AUTO: 211 K/UL — SIGNIFICANT CHANGE UP (ref 150–400)
POTASSIUM SERPL-MCNC: 4.9 MMOL/L — SIGNIFICANT CHANGE UP (ref 3.5–5.3)
POTASSIUM SERPL-SCNC: 4.9 MMOL/L — SIGNIFICANT CHANGE UP (ref 3.5–5.3)
RBC # BLD: 4.35 M/UL — SIGNIFICANT CHANGE UP (ref 4.2–5.8)
RBC # FLD: 13.9 % — SIGNIFICANT CHANGE UP (ref 10.3–14.5)
SODIUM SERPL-SCNC: 134 MMOL/L — LOW (ref 135–145)
TACROLIMUS SERPL-MCNC: 5.2 NG/ML — SIGNIFICANT CHANGE UP
WBC # BLD: 9.52 K/UL — SIGNIFICANT CHANGE UP (ref 3.8–10.5)
WBC # FLD AUTO: 9.52 K/UL — SIGNIFICANT CHANGE UP (ref 3.8–10.5)

## 2022-11-12 PROCEDURE — 99233 SBSQ HOSP IP/OBS HIGH 50: CPT

## 2022-11-12 PROCEDURE — 71045 X-RAY EXAM CHEST 1 VIEW: CPT | Mod: 26

## 2022-11-12 RX ORDER — ALBUMIN HUMAN 25 %
250 VIAL (ML) INTRAVENOUS ONCE
Refills: 0 | Status: COMPLETED | OUTPATIENT
Start: 2022-11-12 | End: 2022-11-12

## 2022-11-12 RX ORDER — ACETAMINOPHEN 500 MG
1000 TABLET ORAL EVERY 6 HOURS
Refills: 0 | Status: COMPLETED | OUTPATIENT
Start: 2022-11-12 | End: 2022-11-13

## 2022-11-12 RX ORDER — METOPROLOL TARTRATE 50 MG
50 TABLET ORAL
Refills: 0 | Status: DISCONTINUED | OUTPATIENT
Start: 2022-11-12 | End: 2022-11-15

## 2022-11-12 RX ORDER — INSULIN LISPRO 100/ML
12 VIAL (ML) SUBCUTANEOUS
Refills: 0 | Status: DISCONTINUED | OUTPATIENT
Start: 2022-11-12 | End: 2022-11-13

## 2022-11-12 RX ORDER — OXYCODONE HYDROCHLORIDE 5 MG/1
5 TABLET ORAL
Refills: 0 | Status: DISCONTINUED | OUTPATIENT
Start: 2022-11-12 | End: 2022-11-14

## 2022-11-12 RX ORDER — INSULIN GLARGINE 100 [IU]/ML
62 INJECTION, SOLUTION SUBCUTANEOUS AT BEDTIME
Refills: 0 | Status: DISCONTINUED | OUTPATIENT
Start: 2022-11-12 | End: 2022-11-13

## 2022-11-12 RX ORDER — HYDROMORPHONE HYDROCHLORIDE 2 MG/ML
0.5 INJECTION INTRAMUSCULAR; INTRAVENOUS; SUBCUTANEOUS
Refills: 0 | Status: DISCONTINUED | OUTPATIENT
Start: 2022-11-12 | End: 2022-11-14

## 2022-11-12 RX ORDER — OXYCODONE HYDROCHLORIDE 5 MG/1
10 TABLET ORAL
Refills: 0 | Status: DISCONTINUED | OUTPATIENT
Start: 2022-11-12 | End: 2022-11-14

## 2022-11-12 RX ORDER — SODIUM,POTASSIUM PHOSPHATES 278-250MG
1 POWDER IN PACKET (EA) ORAL EVERY 4 HOURS
Refills: 0 | Status: COMPLETED | OUTPATIENT
Start: 2022-11-12 | End: 2022-11-12

## 2022-11-12 RX ADMIN — ATORVASTATIN CALCIUM 10 MILLIGRAM(S): 80 TABLET, FILM COATED ORAL at 22:22

## 2022-11-12 RX ADMIN — Medication 1 TABLET(S): at 11:37

## 2022-11-12 RX ADMIN — LIDOCAINE 1 PATCH: 4 CREAM TOPICAL at 11:40

## 2022-11-12 RX ADMIN — LIDOCAINE 1 PATCH: 4 CREAM TOPICAL at 02:30

## 2022-11-12 RX ADMIN — Medication 8 UNIT(S): at 08:29

## 2022-11-12 RX ADMIN — Medication 50 MILLIGRAM(S): at 06:04

## 2022-11-12 RX ADMIN — Medication 400 MILLIGRAM(S): at 17:27

## 2022-11-12 RX ADMIN — Medication 2: at 22:26

## 2022-11-12 RX ADMIN — Medication 100 GRAM(S): at 07:40

## 2022-11-12 RX ADMIN — LIDOCAINE 1 PATCH: 4 CREAM TOPICAL at 19:40

## 2022-11-12 RX ADMIN — HEPARIN SODIUM 7500 UNIT(S): 5000 INJECTION INTRAVENOUS; SUBCUTANEOUS at 06:05

## 2022-11-12 RX ADMIN — POLYETHYLENE GLYCOL 3350 17 GRAM(S): 17 POWDER, FOR SOLUTION ORAL at 11:37

## 2022-11-12 RX ADMIN — Medication 8: at 17:25

## 2022-11-12 RX ADMIN — Medication 250 MILLILITER(S): at 11:40

## 2022-11-12 RX ADMIN — Medication 0.1 MILLIGRAM(S): at 06:05

## 2022-11-12 RX ADMIN — GABAPENTIN 300 MILLIGRAM(S): 400 CAPSULE ORAL at 17:26

## 2022-11-12 RX ADMIN — Medication 50 MILLIGRAM(S): at 14:32

## 2022-11-12 RX ADMIN — Medication 12 UNIT(S): at 17:24

## 2022-11-12 RX ADMIN — Medication 1 TABLET(S): at 14:32

## 2022-11-12 RX ADMIN — Medication 8 UNIT(S): at 11:35

## 2022-11-12 RX ADMIN — HYDROMORPHONE HYDROCHLORIDE 30 MILLILITER(S): 2 INJECTION INTRAMUSCULAR; INTRAVENOUS; SUBCUTANEOUS at 07:42

## 2022-11-12 RX ADMIN — HEPARIN SODIUM 7500 UNIT(S): 5000 INJECTION INTRAVENOUS; SUBCUTANEOUS at 22:23

## 2022-11-12 RX ADMIN — Medication 400 MILLIGRAM(S): at 11:39

## 2022-11-12 RX ADMIN — Medication 50 MILLIGRAM(S): at 17:26

## 2022-11-12 RX ADMIN — Medication 6 MILLIGRAM(S): at 22:23

## 2022-11-12 RX ADMIN — Medication 4: at 06:30

## 2022-11-12 RX ADMIN — Medication 250 MILLILITER(S): at 13:27

## 2022-11-12 RX ADMIN — GABAPENTIN 300 MILLIGRAM(S): 400 CAPSULE ORAL at 06:04

## 2022-11-12 RX ADMIN — TACROLIMUS 1.5 MILLIGRAM(S): 5 CAPSULE ORAL at 18:15

## 2022-11-12 RX ADMIN — Medication 50 MILLIGRAM(S): at 22:22

## 2022-11-12 RX ADMIN — TACROLIMUS 1.5 MILLIGRAM(S): 5 CAPSULE ORAL at 06:06

## 2022-11-12 RX ADMIN — Medication 50 MILLIGRAM(S): at 06:06

## 2022-11-12 RX ADMIN — Medication 0.1 MILLIGRAM(S): at 17:27

## 2022-11-12 RX ADMIN — AZATHIOPRINE 100 MILLIGRAM(S): 100 TABLET ORAL at 14:32

## 2022-11-12 RX ADMIN — Medication 6: at 11:36

## 2022-11-12 RX ADMIN — Medication 81 MILLIGRAM(S): at 11:37

## 2022-11-12 RX ADMIN — HEPARIN SODIUM 7500 UNIT(S): 5000 INJECTION INTRAVENOUS; SUBCUTANEOUS at 14:33

## 2022-11-12 NOTE — PATIENT PROFILE ADULT - SURGICAL SITE INCISION
December 28, 2020     Patient: Jeff Hobbs  YOB: 1991  Date of Visit: 12/28/2020    To Whom it May Concern:    Jeff Hobbs was seen in my clinic on 12/28/2020 at 2:30 pm.  He was seen for hospital discharge follow-up having been admitted to the [facility] on December 18 until December 19, 2020.  Please excuse Jeff for his absence from work on this day to make the appointment.    If you have any questions or concerns, please don't hesitate to call.         Sincerely,         Javier Murphy MD        CC: No Recipients  
no

## 2022-11-12 NOTE — PATIENT PROFILE ADULT - FALL HARM RISK - HARM RISK INTERVENTIONS

## 2022-11-12 NOTE — PROGRESS NOTE ADULT - SUBJECTIVE AND OBJECTIVE BOX
SUBJECTIVE: Patient is doing well with IV PCA and no significant problems reported.    Pain Scale Score	At rest: _3/10__ 	With Activity: ___ 	[X ] Refer to charted pain scores    THERAPY:    [ ] IV PCA Morphine		[ ] 5 mg/mL	[ ] 1 mg/mL  [X ] IV PCA Hydromorphone	[ ] 5 mg/mL	[X ] 1 mg/mL  [ ] IV PCA Fentanyl		[ ] 50 micrograms/mL    Demand dose __0.2_ lockout __6_ (minutes) Continuous Rate _0__ Total: 1.6___   mg used (in past 24 hrs)      MEDICATIONS  (STANDING):  acetaminophen   IVPB .. 1000 milliGRAM(s) IV Intermittent once  acetaminophen   IVPB .. 1000 milliGRAM(s) IV Intermittent every 6 hours  albumin human  5% IVPB 250 milliLiter(s) IV Intermittent once  albumin human  5% IVPB 250 milliLiter(s) IV Intermittent once  aspirin  chewable 81 milliGRAM(s) Oral daily  atorvastatin 10 milliGRAM(s) Oral at bedtime  azaTHIOprine 100 milliGRAM(s) Oral daily  cloNIDine 0.1 milliGRAM(s) Oral two times a day  dextrose 50% Injectable 25 Gram(s) IV Push once  gabapentin 300 milliGRAM(s) Oral two times a day  heparin   Injectable 7500 Unit(s) SubCutaneous every 8 hours  hydrALAZINE 50 milliGRAM(s) Oral three times a day  insulin glargine Injectable (LANTUS) 58 Unit(s) SubCutaneous at bedtime  insulin lispro (ADMELOG) corrective regimen sliding scale   SubCutaneous three times a day before meals  insulin lispro (ADMELOG) corrective regimen sliding scale   SubCutaneous at bedtime  insulin lispro Injectable (ADMELOG) 8 Unit(s) SubCutaneous before breakfast  insulin lispro Injectable (ADMELOG) 8 Unit(s) SubCutaneous before lunch  insulin lispro Injectable (ADMELOG) 8 Unit(s) SubCutaneous before dinner  lactated ringers. 1000 milliLiter(s) (30 mL/Hr) IV Continuous <Continuous>  lidocaine   4% Patch 1 Patch Transdermal every 24 hours  lidocaine   4% Patch 1 Patch Transdermal daily  melatonin 6 milliGRAM(s) Oral at bedtime  metoprolol tartrate 50 milliGRAM(s) Oral two times a day  polyethylene glycol 3350 17 Gram(s) Oral daily  potassium phosphate / sodium phosphate Tablet (K-PHOS No. 2) 1 Tablet(s) Oral every 4 hours  senna 2 Tablet(s) Oral at bedtime  tacrolimus 1.5 milliGRAM(s) Oral every 12 hours  trimethoprim   80 mG/sulfamethoxazole 400 mG 1 Tablet(s) Oral daily    MEDICATIONS  (PRN):  HYDROmorphone  Injectable 0.5 milliGRAM(s) IV Push every 3 hours PRN Severe breakhrough  Pain (7 - 10)  naloxone Injectable 0.1 milliGRAM(s) IV Push every 3 minutes PRN For ANY of the following changes in patient status:  A. RR LESS THAN 10 breaths per minute, B. Oxygen saturation LESS THAN 90%, C. Sedation score of 6  ondansetron Injectable 4 milliGRAM(s) IV Push every 6 hours PRN Nausea  oxyCODONE    IR 5 milliGRAM(s) Oral every 3 hours PRN Moderate Pain (4 - 6)  oxyCODONE    IR 10 milliGRAM(s) Oral every 3 hours PRN Severe Pain (7 - 10)      OBJECTIVE:  Patient is sitting up in chair, with CT x1, PleurX cath x1 and adams.    Sedation Score:	[ X] Alert	[ ] Drowsy 	[ ] Arousable	[ ] Asleep	[ ] Unresponsive    Side Effects:	[X ] None	[ ] Nausea	[ ] Vomiting	[ ] Pruritus  		[ ] Other:    Vital Signs Last 24 Hrs  T(C): 37.3 (12 Nov 2022 08:00), Max: 37.3 (12 Nov 2022 08:00)  T(F): 99.1 (12 Nov 2022 08:00), Max: 99.1 (12 Nov 2022 08:00)  HR: 83 (12 Nov 2022 09:00) (79 - 101)  BP: 131/69 (12 Nov 2022 09:00) (119/72 - 194/75)  BP(mean): 87 (12 Nov 2022 09:00) (81 - 107)  RR: 19 (12 Nov 2022 09:00) (14 - 25)  SpO2: 96% (12 Nov 2022 09:00) (89% - 97%)    Parameters below as of 12 Nov 2022 08:00  Patient On (Oxygen Delivery Method): nasal cannula  O2 Flow (L/min): 3      ASSESSMENT/ PLAN    Therapy to  be:	[ ] Continue   [ X] Discontinued   [X ] Change to prn Analgesics    Documentation and Verification of current medications:   [X] Done	[ ] Not done, not elligible    Comments:  Discussed patient with team and IV Dilaudid PCA discontinued. PRN Oral/IV opioids and/or Adjuvant non-opioid medication to be ordered at this point.    Progress Note written now but Patient was seen earlier.

## 2022-11-12 NOTE — PROGRESS NOTE ADULT - SUBJECTIVE AND OBJECTIVE BOX
CHIEF COMPLAINT: FOLLOW UP IN ICU FOR POSTOPERATIVE CARE OF PATIENT WHO IS S/P DECORTICATION      PROCEDURES: Right VATS, drainage of large pleural effusion, pleural biopsy, RLL partial decortication  10-Nov-2022       ISSUES:   Loculated pleural effusion  Post op pain  Chest tube in place  Hx of CKD s/p kidney transplant (2013) on tacrolimus and azathioprine   DM1 on insulin pump  Morbid obesity  HTN  HLD  CARMEN on BiPAP with 2L O2 at night  Hx of squamous cell carcinoma of skin of nose, L arm, and L ear  Hx of CVA (2015)    Diabetic peripheral neuropathy  Hx of DVT of LLE s/p Eliquis (2016)      INTERVAL EVENTS:   Chest tube with no airleak.        HISTORY:   Patient reports moderate pain at chest wall incision sites which is worse with coughing and deep breathing without associated fever or dyspnea. Pain is improved with use of pain meds.     PHYSICAL EXAM:   Gen: Comfortable, No acute distress  Eyes: Sclera white, Conjunctiva normal, Eyelids normal, Pupils symmetrical   ENT: Mucous membranes moist,  ,  ,    Neck: Trachea midline,  ,  ,  ,  ,  ,    CV: Rate regular, Rhythm regular,  ,  ,    Resp: Breath sounds clear, No accessory muscles use, Two R chest tubes,  ,    Abd: Soft, Non-distended, Non-tender, Bowel sounds normal,  ,  ,    Skin: Warm, No peripheral edema of lower extremities,  ,    : Benitez in place  Neuro: Moving all 4 extremities,    Psych: A&Ox3      ASSESSMENT AND PLAN:     NEURO:  Post-operative Pain - Stable. Pain control with oxycodone and Tylenol IV PRN.        Hx of CVA - stable. Continue aspirin.        RESPIRATORY:  Hypoxia - Wean nasal cannula for goal O2sat above 92. Obtain CXR. Incentive spirometry. Chest PT and frequent suctioning. Continue bronchodilators. OOB to chair & ambulate w/ assistance. Continuous pulse oximetry for support & to prevent decompensation.       Loculated Pleural effusion - improved s/p decortication. Monitor chest tube. Monitor CXR.   Chest tube – Pleurevac regulated suctioning. Monitor chest tube output.          CARMEN - stable. Monitor nocturnal O2sat.         CARDIOVASCULAR:  Hemodynamically stable - Not on pressors. Continue hemodynamic monitoring.  Telemetry (medical test) - Reviewed by me today independently. Normal sinus rhythm.  HTN - stable. Continue metoprolol, hydralazine, and clonidine.          RENAL:  CKD s/p Kidney Transplant – Stable. Continue immunosuppressants. Renally dose medications. Monitor for hyperkalemia and uremia. Avoid nephrotoxic medications. Monitor IOs and electrolytes.          GASTROINTESTINAL:  GI prophylaxis not indicated  Zofran and Reglan IV PRN for nausea  Regular consistency diet             HEMATOLOGIC:  No signs of active bleeding. Monitor Hgb in CBC in AM  DVT prophylaxis with heparin subQ and SCDs.          INFECTIOUS DISEASE:  All surgical sites appear clean. No signs of active infection. Will monitor for fever and leukocytosis.          ENDOCRINE:  DM1 – Lantus and Lispro per Endo Consult. Monitor glucose fingersticks for goal 120-180. Carb control diet. On insulin pump at home. Endocrine consult.          Pertinent clinical, laboratory, radiographic, hemodynamic, echocardiographic, respiratory data, microbiologic data and chart were reviewed by myself and analyzed frequently throughout the course of the day and night by myself.    Plan discussed at length with the CTICU staff and Attending CT Surgeon -   Dr Ana M Johnson      Patient's status was discussed with patient at bedside.    _________________________  VITAL SIGNS:  Vital Signs Last 24 Hrs  T(C): 37.3 (12 Nov 2022 08:00), Max: 37.3 (12 Nov 2022 08:00)  T(F): 99.1 (12 Nov 2022 08:00), Max: 99.1 (12 Nov 2022 08:00)  HR: 83 (12 Nov 2022 09:00) (79 - 101)  BP: 131/69 (12 Nov 2022 09:00) (119/72 - 194/75)  BP(mean): 87 (12 Nov 2022 09:00) (81 - 107)  RR: 19 (12 Nov 2022 09:00) (14 - 25)  SpO2: 96% (12 Nov 2022 09:00) (89% - 97%)    Parameters below as of 12 Nov 2022 08:00  Patient On (Oxygen Delivery Method): nasal cannula  O2 Flow (L/min): 3    I/Os:   I&O's Detail    11 Nov 2022 07:01  -  12 Nov 2022 07:00  --------------------------------------------------------  IN    IV PiggyBack: 300 mL    Lactated Ringers: 690 mL    Oral Fluid: 250 mL  Total IN: 1240 mL    OUT:    Chest Tube (mL): 120 mL    Chest Tube (mL): 25 mL    Indwelling Catheter - Urethral (mL): 2290 mL  Total OUT: 2435 mL    Total NET: -1195 mL      12 Nov 2022 07:01  -  12 Nov 2022 11:21  --------------------------------------------------------  IN:    Lactated Ringers: 90 mL  Total IN: 90 mL    OUT:    Indwelling Catheter - Urethral (mL): 160 mL  Total OUT: 160 mL    Total NET: -70 mL              MEDICATIONS:  MEDICATIONS  (STANDING):  acetaminophen   IVPB .. 1000 milliGRAM(s) IV Intermittent once  acetaminophen   IVPB .. 1000 milliGRAM(s) IV Intermittent every 6 hours  albumin human  5% IVPB 250 milliLiter(s) IV Intermittent once  albumin human  5% IVPB 250 milliLiter(s) IV Intermittent once  aspirin  chewable 81 milliGRAM(s) Oral daily  atorvastatin 10 milliGRAM(s) Oral at bedtime  azaTHIOprine 100 milliGRAM(s) Oral daily  cloNIDine 0.1 milliGRAM(s) Oral two times a day  dextrose 50% Injectable 25 Gram(s) IV Push once  gabapentin 300 milliGRAM(s) Oral two times a day  heparin   Injectable 7500 Unit(s) SubCutaneous every 8 hours  hydrALAZINE 50 milliGRAM(s) Oral three times a day  insulin glargine Injectable (LANTUS) 58 Unit(s) SubCutaneous at bedtime  insulin lispro (ADMELOG) corrective regimen sliding scale   SubCutaneous three times a day before meals  insulin lispro (ADMELOG) corrective regimen sliding scale   SubCutaneous at bedtime  insulin lispro Injectable (ADMELOG) 8 Unit(s) SubCutaneous before breakfast  insulin lispro Injectable (ADMELOG) 8 Unit(s) SubCutaneous before lunch  insulin lispro Injectable (ADMELOG) 8 Unit(s) SubCutaneous before dinner  lactated ringers. 1000 milliLiter(s) (30 mL/Hr) IV Continuous <Continuous>  lidocaine   4% Patch 1 Patch Transdermal every 24 hours  lidocaine   4% Patch 1 Patch Transdermal daily  melatonin 6 milliGRAM(s) Oral at bedtime  metoprolol tartrate 50 milliGRAM(s) Oral two times a day  polyethylene glycol 3350 17 Gram(s) Oral daily  potassium phosphate / sodium phosphate Tablet (K-PHOS No. 2) 1 Tablet(s) Oral every 4 hours  senna 2 Tablet(s) Oral at bedtime  tacrolimus 1.5 milliGRAM(s) Oral every 12 hours  trimethoprim   80 mG/sulfamethoxazole 400 mG 1 Tablet(s) Oral daily    MEDICATIONS  (PRN):  HYDROmorphone  Injectable 0.5 milliGRAM(s) IV Push every 3 hours PRN Severe breakhrough  Pain (7 - 10)  naloxone Injectable 0.1 milliGRAM(s) IV Push every 3 minutes PRN For ANY of the following changes in patient status:  A. RR LESS THAN 10 breaths per minute, B. Oxygen saturation LESS THAN 90%, C. Sedation score of 6  ondansetron Injectable 4 milliGRAM(s) IV Push every 6 hours PRN Nausea  oxyCODONE    IR 5 milliGRAM(s) Oral every 3 hours PRN Moderate Pain (4 - 6)  oxyCODONE    IR 10 milliGRAM(s) Oral every 3 hours PRN Severe Pain (7 - 10)      LABS:  Laboratory data was independently reviewed by me today.                           12.3   9.52  )-----------( 211      ( 12 Nov 2022 05:10 )             38.8     11-12    134<L>  |  96<L>  |  19  ----------------------------<  232<H>  4.9   |  28  |  1.06    Ca    9.3      12 Nov 2022 05:10  Phos  2.3     11-12  Mg     2.00     11-12          ABG - ( 10 Nov 2022 20:20 )  pH, Arterial: 7.32  pH, Blood: x     /  pCO2: 62    /  pO2: 94    / HCO3: 32    / Base Excess: 4.1   /  SaO2: 95.9                  RADIOLOGY:   Radiology images were independently reviewed by me today. Reports were reviewed by me today.    Xray Chest 1 View AP/PA:   ACC: [id number] EXAM:  XR CHEST AP OR PA 1V                          PROCEDURE DATE:  11/11/2022          INTERPRETATION:  CLINICAL INFORMATION: Follow-up post right thoracotomy    TIME OF EXAMINATION: November 11 at 5:22 AM    EXAM: Portable chest    FINDINGS:  Right-sided chest tube in place unchanged from the last exam. Small   layering right effusion suggested on the study of the day before is not   seen on the current exam. Lungs are free of focal consolidations although   postop bibasilar atelectasis is seen resolving. Heart size is stable.        COMPARISON: November 10        IMPRESSION: Follow-up post right thoracotomy with chest tube and postop   changes.    --- End of Report ---            ANTONIO BUSTAMANTE MD; Attending Radiologist  This document has been electronically signed. Nov 11 2022 11:49AM (11-11-22 @ 05:46)  Xray Chest 1 View- PORTABLE-Urgent:   ACC: [id number] EXAM:  XR CHEST PORTABLE URGENT 1V                          PROCEDURE DATE:  11/10/2022          INTERPRETATION:  CLINICAL INFORMATION: Cough; post right thoracotomy    TIME OF EXAMINATION: November 10 at 2:10 PM    EXAM: Portable chest    FINDINGS:  Right-sided chest tubes are present. Evacuation of moderate right pleural   effusion. No pneumothorax. Visible lungs are clear although low lung   volume.        COMPARISON: October 3        IMPRESSION: Status post right thoracotomy with decreased pleural   effusion, no complicating pneumothorax and 2 right-sided chest tubes.    --- End of Report ---            ANTONIO BUSTAMANTE MD; Attending Radiologist  This document has been electronically signed. Nov 10 2022  2:23PM (11-10-22 @ 14:22)

## 2022-11-12 NOTE — PATIENT PROFILE ADULT - FUNCTIONAL ASSESSMENT - BASIC MOBILITY 6.
4-calculated by average/Not able to assess (calculate score using [facility] averaging method)

## 2022-11-12 NOTE — PATIENT PROFILE ADULT - NSPROSPHOSPCHAPLAINYN_GEN_A_NUR
[facility]  [address]  [place] [place] [place] [facility] [address]  Phone: [phone]  Fax: [phone]  [facility]: [email or web address]    Pt Name: Mehul Randhawa  Injury Date: 08/25/2017   Employee ID: [id number] Date of Treatment: 10/09/2020   Company: [employer]      Appointment Time:  Arrived: 9:18 AM   Provider: Misael Hinojosa MD Time Out: 10:45 AM     Office Treatment:   1. Strain of lumbar region, subsequent encounter    2. Acute exacerbation of chronic low back pain    3. Chronic midline low back pain without sciatica    4. Lumbar strain, subsequent encounter    5. Dorsalgia, unspecified      Medications Ordered This Encounter   Medications    ketorolac injection 60 mg      Patient Instructions: Daily home exercises/warm soaks, MRI to be scheduled once authorized    Restrictions: Home today, No lifting/pushing/pulling more than 25 lbs(Patient to begin light duty on 10/12/2020)     Return Appointment: 10/21/2020 at 12:30 PM       MARCELL   no

## 2022-11-12 NOTE — CHART NOTE - NSCHARTNOTEFT_GEN_A_CORE
57yo M w/ DM1 (on insulin pump at home) complicated by CKD/ESRD s/p renal transplant, CVA, neuropathy, PAD s/p amputations, Obesity, HTN, HLD, chronic lung disease/pleural effusion currently admitted for thoracic surgery, being monitored in CTICU. POD 0. Endocrine consulted for assistance with inpatient management of DM1.    BG, insulin administration and chart reviewed   Continue OFF home insulin pump for now - patient has supplies but currently being managed on basal/bolus injections  Hyperglycemia noted today, no DKA. Most recent  mg/dl  Recommend to increase Lantus to 62 units SQ qHS   Increase Admelog to 12 units SQ TID before meals (Hold if NPO/skips meal)   Continue Admelog MODERATE dose correctional scale before meals, moderate dose at bedtime  Consistent carbohydrate diet  Check BG before meals and bedtime  Hypoglycemia protocol  Discussed recommendations with CTICU team  Will follow     CAPILLARY BLOOD GLUCOSE    POCT Blood Glucose.: 261 mg/dL (12 Nov 2022 11:08)  POCT Blood Glucose.: 231 mg/dL (12 Nov 2022 08:24)  POCT Blood Glucose.: 233 mg/dL (12 Nov 2022 06:23)  POCT Blood Glucose.: 304 mg/dL (11 Nov 2022 21:49)  POCT Blood Glucose.: 303 mg/dL (11 Nov 2022 16:25)      11-12    134<L>  |  96<L>  |  19  ----------------------------<  232<H>  4.9   |  28  |  1.06    Ca    9.3      12 Nov 2022 05:10  Phos  2.3     11-12  Mg     2.00     11-12        MEDICATIONS  (STANDING):  acetaminophen   IVPB .. 1000 milliGRAM(s) IV Intermittent once  acetaminophen   IVPB .. 1000 milliGRAM(s) IV Intermittent every 6 hours  aspirin  chewable 81 milliGRAM(s) Oral daily  atorvastatin 10 milliGRAM(s) Oral at bedtime  azaTHIOprine 100 milliGRAM(s) Oral daily  cloNIDine 0.1 milliGRAM(s) Oral two times a day  dextrose 50% Injectable 25 Gram(s) IV Push once  gabapentin 300 milliGRAM(s) Oral two times a day  heparin   Injectable 7500 Unit(s) SubCutaneous every 8 hours  hydrALAZINE 50 milliGRAM(s) Oral three times a day  insulin glargine Injectable (LANTUS) 58 Unit(s) SubCutaneous at bedtime  insulin lispro (ADMELOG) corrective regimen sliding scale   SubCutaneous three times a day before meals  insulin lispro (ADMELOG) corrective regimen sliding scale   SubCutaneous at bedtime  insulin lispro Injectable (ADMELOG) 8 Unit(s) SubCutaneous before lunch  insulin lispro Injectable (ADMELOG) 8 Unit(s) SubCutaneous before dinner  insulin lispro Injectable (ADMELOG) 8 Unit(s) SubCutaneous before breakfast  lactated ringers. 1000 milliLiter(s) (30 mL/Hr) IV Continuous <Continuous>  lidocaine   4% Patch 1 Patch Transdermal every 24 hours  lidocaine   4% Patch 1 Patch Transdermal daily  melatonin 6 milliGRAM(s) Oral at bedtime  metoprolol tartrate 50 milliGRAM(s) Oral two times a day  polyethylene glycol 3350 17 Gram(s) Oral daily  potassium phosphate / sodium phosphate Tablet (K-PHOS No. 2) 1 Tablet(s) Oral every 4 hours  senna 2 Tablet(s) Oral at bedtime  tacrolimus 1.5 milliGRAM(s) Oral every 12 hours  trimethoprim   80 mG/sulfamethoxazole 400 mG 1 Tablet(s) Oral daily      Diet, Renal Restrictions:   For patients receiving Renal Replacement - No Protein Restr, No Conc K, No Conc Phos, Low Sodium  Consistent Carbohydrate {No Snacks} (CSTCHO) (11-10-22 @ 16:59)      A1C with Estimated Average Glucose Result: 7.2 % (11-01-22 @ 15:00)  A1C with Estimated Average Glucose Result: 8.6 % (08-13-22 @ 06:01)  A1C with Estimated Average Glucose Result: 10.2 % (05-11-22 @ 17:39)  A1C with Estimated Average Glucose Result: 10.4 % (05-10-22 @ 23:22)  A1C with Estimated Average Glucose Result: 9.4 % (02-10-22 @ 12:00)      Gretchen Roberts  Nurse Practitioner  Division of Endocrinology & Diabetes  In house pager #[phone]/long range pager #[phone]    If before 9AM or after 6PM, or on weekends/holidays, please call endocrine answering service for assistance ([phone]).  For nonurgent matters email [email or web address] for assistance.
Endocrinology consulted on Mr. Rodriguez who has type 1 diabetes on insulin pump who is going for thoracic surgery. Per provider patient has basal rate 3 units per hour, turned off pump this morning when BG 79, now in 90s. Per chart review, has a medtronig 630G and follows with Dr. Garcia. Previously was previously bolusing himself 5 or 10 units with meals and did not have carb ratio?.    Recommend  1) Start dextrose maintenance fluids at 30cc/hr, titrate to maintin -180  2) Start insulin infusion at 2 units per hour per protocol, can bolus 2 units per protocol  3) Remove insulin pump 1 hour after insulin infusion starts  4) q1hr fingersticks    Full consult later today.    Miguel Miller MD  Endocrinology Fellow

## 2022-11-12 NOTE — PROGRESS NOTE ADULT - SUBJECTIVE AND OBJECTIVE BOX
Anesthesia Pain Management Service    SUBJECTIVE: Patient is doing well with IV PCA and no significant problems reported.    Pain Scale Score	At rest: _3/10__ 	With Activity: ___ 	[X ] Refer to charted pain scores    THERAPY:    [ ] IV PCA Morphine		[ ] 5 mg/mL	[ ] 1 mg/mL  [X ] IV PCA Hydromorphone	[ ] 5 mg/mL	[X ] 1 mg/mL  [ ] IV PCA Fentanyl		[ ] 50 micrograms/mL    Demand dose __0.2_ lockout __6_ (minutes) Continuous Rate _0__ Total: 1.6___   mg used (in past 24 hrs)      MEDICATIONS  (STANDING):  acetaminophen   IVPB .. 1000 milliGRAM(s) IV Intermittent once  acetaminophen   IVPB .. 1000 milliGRAM(s) IV Intermittent every 6 hours  albumin human  5% IVPB 250 milliLiter(s) IV Intermittent once  albumin human  5% IVPB 250 milliLiter(s) IV Intermittent once  aspirin  chewable 81 milliGRAM(s) Oral daily  atorvastatin 10 milliGRAM(s) Oral at bedtime  azaTHIOprine 100 milliGRAM(s) Oral daily  cloNIDine 0.1 milliGRAM(s) Oral two times a day  dextrose 50% Injectable 25 Gram(s) IV Push once  gabapentin 300 milliGRAM(s) Oral two times a day  heparin   Injectable 7500 Unit(s) SubCutaneous every 8 hours  hydrALAZINE 50 milliGRAM(s) Oral three times a day  insulin glargine Injectable (LANTUS) 58 Unit(s) SubCutaneous at bedtime  insulin lispro (ADMELOG) corrective regimen sliding scale   SubCutaneous three times a day before meals  insulin lispro (ADMELOG) corrective regimen sliding scale   SubCutaneous at bedtime  insulin lispro Injectable (ADMELOG) 8 Unit(s) SubCutaneous before breakfast  insulin lispro Injectable (ADMELOG) 8 Unit(s) SubCutaneous before lunch  insulin lispro Injectable (ADMELOG) 8 Unit(s) SubCutaneous before dinner  lactated ringers. 1000 milliLiter(s) (30 mL/Hr) IV Continuous <Continuous>  lidocaine   4% Patch 1 Patch Transdermal every 24 hours  lidocaine   4% Patch 1 Patch Transdermal daily  melatonin 6 milliGRAM(s) Oral at bedtime  metoprolol tartrate 50 milliGRAM(s) Oral two times a day  polyethylene glycol 3350 17 Gram(s) Oral daily  potassium phosphate / sodium phosphate Tablet (K-PHOS No. 2) 1 Tablet(s) Oral every 4 hours  senna 2 Tablet(s) Oral at bedtime  tacrolimus 1.5 milliGRAM(s) Oral every 12 hours  trimethoprim   80 mG/sulfamethoxazole 400 mG 1 Tablet(s) Oral daily    MEDICATIONS  (PRN):  HYDROmorphone  Injectable 0.5 milliGRAM(s) IV Push every 3 hours PRN Severe breakhrough  Pain (7 - 10)  naloxone Injectable 0.1 milliGRAM(s) IV Push every 3 minutes PRN For ANY of the following changes in patient status:  A. RR LESS THAN 10 breaths per minute, B. Oxygen saturation LESS THAN 90%, C. Sedation score of 6  ondansetron Injectable 4 milliGRAM(s) IV Push every 6 hours PRN Nausea  oxyCODONE    IR 5 milliGRAM(s) Oral every 3 hours PRN Moderate Pain (4 - 6)  oxyCODONE    IR 10 milliGRAM(s) Oral every 3 hours PRN Severe Pain (7 - 10)      OBJECTIVE:  Patient is sitting up in chair, with CT x1, PleurX cath x1 and adams.    Sedation Score:	[ X] Alert	[ ] Drowsy 	[ ] Arousable	[ ] Asleep	[ ] Unresponsive    Side Effects:	[X ] None	[ ] Nausea	[ ] Vomiting	[ ] Pruritus  		[ ] Other:    Vital Signs Last 24 Hrs  T(C): 37.3 (12 Nov 2022 08:00), Max: 37.3 (12 Nov 2022 08:00)  T(F): 99.1 (12 Nov 2022 08:00), Max: 99.1 (12 Nov 2022 08:00)  HR: 83 (12 Nov 2022 09:00) (79 - 101)  BP: 131/69 (12 Nov 2022 09:00) (119/72 - 194/75)  BP(mean): 87 (12 Nov 2022 09:00) (81 - 107)  RR: 19 (12 Nov 2022 09:00) (14 - 25)  SpO2: 96% (12 Nov 2022 09:00) (89% - 97%)    Parameters below as of 12 Nov 2022 08:00  Patient On (Oxygen Delivery Method): nasal cannula  O2 Flow (L/min): 3      ASSESSMENT/ PLAN    Therapy to  be:	[ ] Continue   [ X] Discontinued   [X ] Change to prn Analgesics    Documentation and Verification of current medications:   [X] Done	[ ] Not done, not elligible    Comments:  Discussed patient with team and IV Dilaudid PCA discontinued. PRN Oral/IV opioids and/or Adjuvant non-opioid medication to be ordered at this point.    Progress Note written now but Patient was seen earlier.

## 2022-11-13 DIAGNOSIS — Z96.41 PRESENCE OF INSULIN PUMP (EXTERNAL) (INTERNAL): ICD-10-CM

## 2022-11-13 LAB
ANION GAP SERPL CALC-SCNC: 10 MMOL/L — SIGNIFICANT CHANGE UP (ref 7–14)
BUN SERPL-MCNC: 15 MG/DL — SIGNIFICANT CHANGE UP (ref 7–23)
CALCIUM SERPL-MCNC: 9.5 MG/DL — SIGNIFICANT CHANGE UP (ref 8.4–10.5)
CHLORIDE SERPL-SCNC: 97 MMOL/L — LOW (ref 98–107)
CO2 SERPL-SCNC: 28 MMOL/L — SIGNIFICANT CHANGE UP (ref 22–31)
CREAT SERPL-MCNC: 1.05 MG/DL — SIGNIFICANT CHANGE UP (ref 0.5–1.3)
EGFR: 84 ML/MIN/1.73M2 — SIGNIFICANT CHANGE UP
GLUCOSE BLDC GLUCOMTR-MCNC: 158 MG/DL — HIGH (ref 70–99)
GLUCOSE BLDC GLUCOMTR-MCNC: 166 MG/DL — HIGH (ref 70–99)
GLUCOSE BLDC GLUCOMTR-MCNC: 201 MG/DL — HIGH (ref 70–99)
GLUCOSE BLDC GLUCOMTR-MCNC: 239 MG/DL — HIGH (ref 70–99)
GLUCOSE SERPL-MCNC: 259 MG/DL — HIGH (ref 70–99)
HCT VFR BLD CALC: 36.1 % — LOW (ref 39–50)
HGB BLD-MCNC: 11.4 G/DL — LOW (ref 13–17)
MAGNESIUM SERPL-MCNC: 1.8 MG/DL — SIGNIFICANT CHANGE UP (ref 1.6–2.6)
MCHC RBC-ENTMCNC: 28.1 PG — SIGNIFICANT CHANGE UP (ref 27–34)
MCHC RBC-ENTMCNC: 31.6 GM/DL — LOW (ref 32–36)
MCV RBC AUTO: 88.9 FL — SIGNIFICANT CHANGE UP (ref 80–100)
NRBC # BLD: 0 /100 WBCS — SIGNIFICANT CHANGE UP (ref 0–0)
NRBC # FLD: 0 K/UL — SIGNIFICANT CHANGE UP (ref 0–0)
PHOSPHATE SERPL-MCNC: 2 MG/DL — LOW (ref 2.5–4.5)
PLATELET # BLD AUTO: 202 K/UL — SIGNIFICANT CHANGE UP (ref 150–400)
POTASSIUM SERPL-MCNC: 4.4 MMOL/L — SIGNIFICANT CHANGE UP (ref 3.5–5.3)
POTASSIUM SERPL-SCNC: 4.4 MMOL/L — SIGNIFICANT CHANGE UP (ref 3.5–5.3)
RBC # BLD: 4.06 M/UL — LOW (ref 4.2–5.8)
RBC # FLD: 13.8 % — SIGNIFICANT CHANGE UP (ref 10.3–14.5)
SODIUM SERPL-SCNC: 135 MMOL/L — SIGNIFICANT CHANGE UP (ref 135–145)
WBC # BLD: 6.73 K/UL — SIGNIFICANT CHANGE UP (ref 3.8–10.5)
WBC # FLD AUTO: 6.73 K/UL — SIGNIFICANT CHANGE UP (ref 3.8–10.5)

## 2022-11-13 PROCEDURE — 99232 SBSQ HOSP IP/OBS MODERATE 35: CPT

## 2022-11-13 PROCEDURE — 71045 X-RAY EXAM CHEST 1 VIEW: CPT | Mod: 26,76

## 2022-11-13 RX ORDER — INSULIN LISPRO 100/ML
16 VIAL (ML) SUBCUTANEOUS
Refills: 0 | Status: DISCONTINUED | OUTPATIENT
Start: 2022-11-13 | End: 2022-11-15

## 2022-11-13 RX ORDER — INSULIN GLARGINE 100 [IU]/ML
66 INJECTION, SOLUTION SUBCUTANEOUS AT BEDTIME
Refills: 0 | Status: DISCONTINUED | OUTPATIENT
Start: 2022-11-13 | End: 2022-11-15

## 2022-11-13 RX ORDER — SODIUM,POTASSIUM PHOSPHATES 278-250MG
1 POWDER IN PACKET (EA) ORAL ONCE
Refills: 0 | Status: COMPLETED | OUTPATIENT
Start: 2022-11-13 | End: 2022-11-13

## 2022-11-13 RX ADMIN — HEPARIN SODIUM 7500 UNIT(S): 5000 INJECTION INTRAVENOUS; SUBCUTANEOUS at 14:05

## 2022-11-13 RX ADMIN — INSULIN GLARGINE 62 UNIT(S): 100 INJECTION, SOLUTION SUBCUTANEOUS at 01:22

## 2022-11-13 RX ADMIN — OXYCODONE HYDROCHLORIDE 10 MILLIGRAM(S): 5 TABLET ORAL at 18:25

## 2022-11-13 RX ADMIN — Medication 4: at 13:29

## 2022-11-13 RX ADMIN — Medication 1 TABLET(S): at 12:58

## 2022-11-13 RX ADMIN — Medication 6 MILLIGRAM(S): at 21:57

## 2022-11-13 RX ADMIN — Medication 50 MILLIGRAM(S): at 21:56

## 2022-11-13 RX ADMIN — TACROLIMUS 1.5 MILLIGRAM(S): 5 CAPSULE ORAL at 18:20

## 2022-11-13 RX ADMIN — Medication 50 MILLIGRAM(S): at 18:21

## 2022-11-13 RX ADMIN — Medication 0.1 MILLIGRAM(S): at 05:17

## 2022-11-13 RX ADMIN — Medication 50 MILLIGRAM(S): at 14:05

## 2022-11-13 RX ADMIN — OXYCODONE HYDROCHLORIDE 10 MILLIGRAM(S): 5 TABLET ORAL at 19:14

## 2022-11-13 RX ADMIN — Medication 50 MILLIGRAM(S): at 05:16

## 2022-11-13 RX ADMIN — Medication 2: at 18:19

## 2022-11-13 RX ADMIN — GABAPENTIN 300 MILLIGRAM(S): 400 CAPSULE ORAL at 18:21

## 2022-11-13 RX ADMIN — Medication 16 UNIT(S): at 13:29

## 2022-11-13 RX ADMIN — Medication 4: at 09:33

## 2022-11-13 RX ADMIN — Medication 12 UNIT(S): at 09:33

## 2022-11-13 RX ADMIN — GABAPENTIN 300 MILLIGRAM(S): 400 CAPSULE ORAL at 05:17

## 2022-11-13 RX ADMIN — Medication 1 PACKET(S): at 11:05

## 2022-11-13 RX ADMIN — ATORVASTATIN CALCIUM 10 MILLIGRAM(S): 80 TABLET, FILM COATED ORAL at 21:56

## 2022-11-13 RX ADMIN — HEPARIN SODIUM 7500 UNIT(S): 5000 INJECTION INTRAVENOUS; SUBCUTANEOUS at 21:56

## 2022-11-13 RX ADMIN — Medication 16 UNIT(S): at 18:20

## 2022-11-13 RX ADMIN — Medication 81 MILLIGRAM(S): at 12:57

## 2022-11-13 RX ADMIN — INSULIN GLARGINE 66 UNIT(S): 100 INJECTION, SOLUTION SUBCUTANEOUS at 22:04

## 2022-11-13 RX ADMIN — HEPARIN SODIUM 7500 UNIT(S): 5000 INJECTION INTRAVENOUS; SUBCUTANEOUS at 05:16

## 2022-11-13 RX ADMIN — TACROLIMUS 1.5 MILLIGRAM(S): 5 CAPSULE ORAL at 05:56

## 2022-11-13 RX ADMIN — AZATHIOPRINE 100 MILLIGRAM(S): 100 TABLET ORAL at 13:04

## 2022-11-13 RX ADMIN — Medication 0.1 MILLIGRAM(S): at 17:48

## 2022-11-13 NOTE — PROGRESS NOTE ADULT - SUBJECTIVE AND OBJECTIVE BOX
Chief Complaint: DM 1 with hyperglycemia     History: Patient seen at bedside. Reports he is eating meals, denies n/v, denies s/s of hypoglycemia  FS remain above target, however most recent improved to 201 mg/dl    MEDICATIONS  (STANDING):  acetaminophen   IVPB .. 1000 milliGRAM(s) IV Intermittent once  aspirin  chewable 81 milliGRAM(s) Oral daily  atorvastatin 10 milliGRAM(s) Oral at bedtime  azaTHIOprine 100 milliGRAM(s) Oral daily  cloNIDine 0.1 milliGRAM(s) Oral two times a day  dextrose 50% Injectable 25 Gram(s) IV Push once  gabapentin 300 milliGRAM(s) Oral two times a day  heparin   Injectable 7500 Unit(s) SubCutaneous every 8 hours  hydrALAZINE 50 milliGRAM(s) Oral three times a day  insulin glargine Injectable (LANTUS) 62 Unit(s) SubCutaneous at bedtime  insulin lispro (ADMELOG) corrective regimen sliding scale   SubCutaneous three times a day before meals  insulin lispro (ADMELOG) corrective regimen sliding scale   SubCutaneous at bedtime  insulin lispro Injectable (ADMELOG) 16 Unit(s) SubCutaneous three times a day before meals  lidocaine   4% Patch 1 Patch Transdermal daily  lidocaine   4% Patch 1 Patch Transdermal every 24 hours  melatonin 6 milliGRAM(s) Oral at bedtime  metoprolol tartrate 50 milliGRAM(s) Oral two times a day  polyethylene glycol 3350 17 Gram(s) Oral daily  senna 2 Tablet(s) Oral at bedtime  tacrolimus 1.5 milliGRAM(s) Oral every 12 hours  trimethoprim   80 mG/sulfamethoxazole 400 mG 1 Tablet(s) Oral daily    MEDICATIONS  (PRN):  HYDROmorphone  Injectable 0.5 milliGRAM(s) IV Push every 3 hours PRN Severe breakhrough  Pain (7 - 10)  naloxone Injectable 0.1 milliGRAM(s) IV Push every 3 minutes PRN For ANY of the following changes in patient status:  A. RR LESS THAN 10 breaths per minute, B. Oxygen saturation LESS THAN 90%, C. Sedation score of 6  ondansetron Injectable 4 milliGRAM(s) IV Push every 6 hours PRN Nausea  oxyCODONE    IR 5 milliGRAM(s) Oral every 3 hours PRN Moderate Pain (4 - 6)  oxyCODONE    IR 10 milliGRAM(s) Oral every 3 hours PRN Severe Pain (7 - 10)    No Known Allergies    Review of Systems:  Cardiovascular: No chest pain  Respiratory: No SOB  GI: No nausea, vomiting  Endocrine: no hypoglycemia     PHYSICAL EXAM:  VITALS: T(C): 36.7 (11-13-22 @ 16:33)  T(F): 98 (11-13-22 @ 16:33), Max: 99.7 (11-13-22 @ 09:03)  HR: 83 (11-13-22 @ 16:33) (79 - 101)  BP: 144/70 (11-13-22 @ 16:33) (140/67 - 185/76)  RR:  (15 - 22)  SpO2:  (95% - 99%)  Wt(kg): --  GENERAL: NAD  EYES: No proptosis, no lid lag, anicteric  HEENT:  Atraumatic, Normocephalic, moist mucous membranes  RESPIRATORY: unlabored respirations   PSYCH: Alert and oriented x 3, normal affect, normal mood    CAPILLARY BLOOD GLUCOSE    POCT Blood Glucose.: 201 mg/dL (13 Nov 2022 13:17)  POCT Blood Glucose.: 239 mg/dL (13 Nov 2022 08:52)  POCT Blood Glucose.: 262 mg/dL (12 Nov 2022 22:25)  POCT Blood Glucose.: 307 mg/dL (12 Nov 2022 16:57)      11-13    135  |  97<L>  |  15  ----------------------------<  259<H>  4.4   |  28  |  1.05    eGFR: 84    Ca    9.5      11-13  Mg     1.80     11-13  Phos  2.0     11-13      A1C with Estimated Average Glucose Result: 7.2 % (11-01-22 @ 15:00)  A1C with Estimated Average Glucose Result: 8.6 % (08-13-22 @ 06:01)  A1C with Estimated Average Glucose Result: 10.2 % (05-11-22 @ 17:39)  A1C with Estimated Average Glucose Result: 10.4 % (05-10-22 @ 23:22)  A1C with Estimated Average Glucose Result: 9.4 % (02-10-22 @ 12:00)    Diet, Renal Restrictions:   For patients receiving Renal Replacement - No Protein Restr, No Conc K, No Conc Phos, Low Sodium  Consistent Carbohydrate No Snacks (CSTCHO) (11-10-22 @ 16:59) [Active]

## 2022-11-13 NOTE — PROGRESS NOTE ADULT - ASSESSMENT
57yo M w/ DM1 (on insulin pump at home) complicated by CKD/ESRD s/p renal transplant, CVA, neuropathy, PAD s/p amputations, Obesity, HTN, HLD, chronic lung disease/pleural effusion currently admitted for thoracic surgery. Endocrine consulted for assistance with inpatient management of DM 1.    1. DM1 with complications   A1c 7.2%   Home Regimen: Medronic Insulin Pump with Dexcom CGM (see settings below, TDB 72 units)   Continue off home insulin pump for now - has supplies if desiring to resume    While inpatient:   BG target 100-180 mg/dl  Glucose improving but remains above target  Increase Lantus to 67 units SQ qHS   Note dose this high may be poorly absorbed. If remaining inpatient on basal/bolus regimen, consider splitting dose to BID  Increased Admelog to 16 units TID before meals (Hold if NPO/skips meal)   Continue Admelog MODERATE dose correctional scale TID before meals & moderate dose correction scale at bedtime   Check FS before meals and bedtime  Carb consistent diet   Hypoglycemia protocol     Discharge Plan:   Resume Medtronic pump + Dexcom CGM  For settings, would prefer to add ISF and ICR so patient can bolus properly before meals, however, patient currently with well controlled A1c so he may want to keep settings the same and followup with his outpatient provider - TBD   Note that pump can be resumed 22h after last basal insulin dose, or temp basal rate can be used until Lantus dose wears off. Contact endocrine when discharge planning so proper insulin pump transitioning can occur  Please ensure that patient has DM supplies, ketone strips, glucagon prior to discharge  Patient should obtain medic ID or medical alert device for Type 1 diabetes  Routine outpatient PCP, opthalmology, podiatry and endocrinology followup   Outpatient follow up with patient's private endocrinologist Dr. Jasbir Garcia.      2. Insulin pump status   Medtronic Insulin Pump (with Humalog), also uses Dexcom CGM  Current pump settings per patient: 3 units/hr for full 24 hours (TOTAL BASAL = 72 units)   Does not have set ICR or ISF. He says that he carb counts & boluses using a rough ICR of 1:9/10 (ie. if eating 40 grams of carbs, may bolus 3-4 units) and uses a sliding scale for correction. States maximum he uses is 10 units before meals  Reviewed proper use of bolus wizard, ICR and ISF, patient reports he will think about this    3. HTN  BP goal less than 130/80   On Clonidine, Hydralazine, Metoprolol  Recommend outpatient annual urinary microalb/cr ratio    4. HLD  LDL goal less than 70  LDL 74 as of 8/2022  Currently on Atorvastatin 10 mg daily here, would increase if no contraindications. He is on Lovastatin 40 mg at home    Gretchen Roberts  Nurse Practitioner  Division of Endocrinology & Diabetes  In house pager #[phone]/long range pager #[phone]    If before 9AM or after 6PM, or on weekends/holidays, please call endocrine answering service for assistance ([phone]).  For nonurgent matters email [email or web address] for assistance.    55yo M w/ DM1 (on insulin pump at home) complicated by CKD/ESRD s/p renal transplant, CVA, neuropathy, PAD s/p amputations, Obesity, HTN, HLD, chronic lung disease/pleural effusion currently admitted for thoracic surgery. Endocrine consulted for assistance with inpatient management of DM 1.    1. DM1 with complications   A1c 7.2%   Home Regimen: Medronic Insulin Pump with Dexcom CGM (see settings below, TDB 72 units)   Continue off home insulin pump for now - has supplies if desiring to resume    While inpatient:   BG target 100-180 mg/dl  Glucose improving but remains above target  Increase Lantus to 66 units SQ qHS   Note dose this high may be poorly absorbed. If remaining inpatient on basal/bolus regimen, consider splitting dose to BID  Increased Admelog to 16 units TID before meals (Hold if NPO/skips meal)   Continue Admelog MODERATE dose correctional scale TID before meals & moderate dose correction scale at bedtime   Check FS before meals and bedtime  Carb consistent diet   Hypoglycemia protocol     Discharge Plan:   Resume Medtronic pump + Dexcom CGM  For settings, would prefer to add ISF and ICR so patient can bolus properly before meals, however, patient currently with well controlled A1c so he may want to keep settings the same and followup with his outpatient provider - TBD   Note that pump can be resumed 22h after last basal insulin dose, or temp basal rate can be used until Lantus dose wears off. Contact endocrine when discharge planning so proper insulin pump transitioning can occur  Please ensure that patient has DM supplies, ketone strips, glucagon prior to discharge  Patient should obtain medic ID or medical alert device for Type 1 diabetes  Routine outpatient PCP, opthalmology, podiatry and endocrinology followup   Outpatient follow up with patient's private endocrinologist Dr. Jasbir Garcia.      2. Insulin pump status   Medtronic Insulin Pump (with Humalog), also uses Dexcom CGM  Current pump settings per patient: 3 units/hr for full 24 hours (TOTAL BASAL = 72 units)   Does not have set ICR or ISF. He says that he carb counts & boluses using a rough ICR of 1:9/10 (ie. if eating 40 grams of carbs, may bolus 3-4 units) and uses a sliding scale for correction. States maximum he uses is 10 units before meals  Reviewed proper use of bolus wizard, ICR and ISF, patient reports he will think about this    3. HTN  BP goal less than 130/80   On Clonidine, Hydralazine, Metoprolol  Recommend outpatient annual urinary microalb/cr ratio    4. HLD  LDL goal less than 70  LDL 74 as of 8/2022  Currently on Atorvastatin 10 mg daily here, would increase if no contraindications. He is on Lovastatin 40 mg at home    Gretchen Roberts  Nurse Practitioner  Division of Endocrinology & Diabetes  In house pager #[phone]/long range pager #[phone]    If before 9AM or after 6PM, or on weekends/holidays, please call endocrine answering service for assistance ([phone]).  For nonurgent matters email [email or web address] for assistance.

## 2022-11-13 NOTE — PROGRESS NOTE ADULT - SUBJECTIVE AND OBJECTIVE BOX
Subjective: 56yMale seen at bedside, out of bed to chair during morning bedside rounds. Patient with complaints, denies sob, cp, nvd. No acute events overnight. 2x right sided chest tubes remain in place to sxn with no ALs, Right foot gauze in place.     Vital Signs:  Vital Signs Last 24 Hrs  T(C): 37.6 (11-13-22 @ 09:03), Max: 37.6 (11-13-22 @ 00:50)  T(F): 99.7 (11-13-22 @ 09:03), Max: 99.7 (11-13-22 @ 09:03)  HR: 82 (11-13-22 @ 09:03) (79 - 101)  BP: 144/80 (11-13-22 @ 09:03) (118/67 - 185/76)  RR: 18 (11-13-22 @ 09:03) (15 - 24)  SpO2: 98% (11-13-22 @ 09:03) (94% - 98%) on (O2)    Pertinent Physical Exam:  Telemetry/Alarms: NSR 90s  General: WN/WD NAD  Neurology: Awake, nonfocal  ENT:Gross hearing intact, no stridor  Respiratory:No wheezing, rales, rhonchi  CV: RRR  Abdominal: Soft, NT, ND  Extremities: +1 Edema to bilateral lower extremities. Right foot with dressing from home for hx of diabetic foot and wound.   Skin: No Rashes, Hematoma  Psych: Oriented x 3  Incisions: CDI  Tubes: R 2x    Chest Tube: Right Side   Air Leak: Yes[] / No[x] x2   Drainage: S: 55 A: 80    I&O's Summary    12 Nov 2022 07:01  -  13 Nov 2022 07:00  --------------------------------------------------------  IN: 1260 mL / OUT: 1440 mL / NET: -180 mL        Relevant labs, radiology and Medications reviewed                        11.4   6.73  )-----------( 202      ( 13 Nov 2022 05:15 )             36.1     11-13    135  |  97<L>  |  15  ----------------------------<  259<H>  4.4   |  28  |  1.05    Ca    9.5      13 Nov 2022 05:15  Phos  2.0     11-13  Mg     1.80     11-13            MEDICATIONS  (STANDING):  acetaminophen   IVPB .. 1000 milliGRAM(s) IV Intermittent once  aspirin  chewable 81 milliGRAM(s) Oral daily  atorvastatin 10 milliGRAM(s) Oral at bedtime  azaTHIOprine 100 milliGRAM(s) Oral daily  cloNIDine 0.1 milliGRAM(s) Oral two times a day  dextrose 50% Injectable 25 Gram(s) IV Push once  gabapentin 300 milliGRAM(s) Oral two times a day  heparin   Injectable 7500 Unit(s) SubCutaneous every 8 hours  hydrALAZINE 50 milliGRAM(s) Oral three times a day  insulin glargine Injectable (LANTUS) 62 Unit(s) SubCutaneous at bedtime  insulin lispro (ADMELOG) corrective regimen sliding scale   SubCutaneous three times a day before meals  insulin lispro (ADMELOG) corrective regimen sliding scale   SubCutaneous at bedtime  insulin lispro Injectable (ADMELOG) 12 Unit(s) SubCutaneous before breakfast  insulin lispro Injectable (ADMELOG) 12 Unit(s) SubCutaneous before lunch  insulin lispro Injectable (ADMELOG) 12 Unit(s) SubCutaneous before dinner  lidocaine   4% Patch 1 Patch Transdermal every 24 hours  lidocaine   4% Patch 1 Patch Transdermal daily  melatonin 6 milliGRAM(s) Oral at bedtime  metoprolol tartrate 50 milliGRAM(s) Oral two times a day  polyethylene glycol 3350 17 Gram(s) Oral daily  potassium phosphate / sodium phosphate Powder (PHOS-NaK) 1 Packet(s) Oral once  senna 2 Tablet(s) Oral at bedtime  tacrolimus 1.5 milliGRAM(s) Oral every 12 hours  trimethoprim   80 mG/sulfamethoxazole 400 mG 1 Tablet(s) Oral daily    MEDICATIONS  (PRN):  HYDROmorphone  Injectable 0.5 milliGRAM(s) IV Push every 3 hours PRN Severe breakhrough  Pain (7 - 10)  naloxone Injectable 0.1 milliGRAM(s) IV Push every 3 minutes PRN For ANY of the following changes in patient status:  A. RR LESS THAN 10 breaths per minute, B. Oxygen saturation LESS THAN 90%, C. Sedation score of 6  ondansetron Injectable 4 milliGRAM(s) IV Push every 6 hours PRN Nausea  oxyCODONE    IR 5 milliGRAM(s) Oral every 3 hours PRN Moderate Pain (4 - 6)  oxyCODONE    IR 10 milliGRAM(s) Oral every 3 hours PRN Severe Pain (7 - 10)        Assessment  55yo M w/ DM1 (on insulin pump at home) complicated by CKD/ESRD s/p renal transplant (2013) on Tacrolimus and Azathioprine, , CVA, diabetic peripheral neuropathy, PAD s/p amputations, Obesity, HTN, HLD, DVT of LLE s/p Eliquis (2016)   quamous cell carcinoma of skin of nose, L arm, and L ear COPD on BiPAP with 2L O2 at night, chronic lung disease/pleural effusion currently s/p Right VATS, drainage of large pleural effusion, pleural biopsy, RLL partial decortication  10-Nov-2022.     Post op course c/b hyperglycemia and HTN. Endocrine consulted for assistance with glucose control.   11/13: Continues to be hyperglycemic with AM sugars in mid 250s, Lantus and Humalog increased yesterday. WIll reassess today. CT to WS.                         PLAN  Neuro: Pain management  with PO Tylenol, cont with gabapentin. Cont with clonidine. Right lower foot diabetic wound - wound/endocrine to be cx.   Pulm: Encourage coughing, deep breathing and use of incentive spirometry. Wean off supplemental oxygen as able as patient using 3L during day. No BiPAP at night during stay as patient has refused. Cont with chest PT. Daily CXR.   Cardio: Monitor telemetry/alarms. Resume blood pressure medications as able. Continue with statin, ASA, BB.   GI: Tolerating diet, glucose control. Continue stool softeners.  Renal: monitor urine output. Hx of Renal trasnsplant. Cont with Tacrolimus. Cr stable.   Vasc: Heparin SC/SCDs for DVT prophylaxis  Heme: Stable H/H.   Endocrine: Blood glucose checks per routine. Endocrine following for hyperglycemia. Lantus and Humalog changes made. Still elevated AM sugar. F/U with endocrine, will likely need increase in lantus.   ID: PPX bactrim. Stable.  Therapy: OOB/ambulate. PT no needs. Uses boot for diabetic foot with wound dressing changes at home by himself every two days. Wound vs Change at bedside to assess.   Tubes: Monitor Chest tube output. WS today  Disposition: Aim to D/C to home once CTs removed.  Discussed with Cardiothoracic Team at AM rounds.

## 2022-11-14 LAB
ALBUMIN SERPL ELPH-MCNC: 3.7 G/DL — SIGNIFICANT CHANGE UP (ref 3.3–5)
ALP SERPL-CCNC: 72 U/L — SIGNIFICANT CHANGE UP (ref 40–120)
ALT FLD-CCNC: 5 U/L — SIGNIFICANT CHANGE UP (ref 4–41)
ANION GAP SERPL CALC-SCNC: 11 MMOL/L — SIGNIFICANT CHANGE UP (ref 7–14)
AST SERPL-CCNC: 9 U/L — SIGNIFICANT CHANGE UP (ref 4–40)
BILIRUB SERPL-MCNC: 0.4 MG/DL — SIGNIFICANT CHANGE UP (ref 0.2–1.2)
BUN SERPL-MCNC: 16 MG/DL — SIGNIFICANT CHANGE UP (ref 7–23)
CALCIUM SERPL-MCNC: 10 MG/DL — SIGNIFICANT CHANGE UP (ref 8.4–10.5)
CHLORIDE SERPL-SCNC: 94 MMOL/L — LOW (ref 98–107)
CO2 SERPL-SCNC: 31 MMOL/L — SIGNIFICANT CHANGE UP (ref 22–31)
CREAT SERPL-MCNC: 1.05 MG/DL — SIGNIFICANT CHANGE UP (ref 0.5–1.3)
EGFR: 84 ML/MIN/1.73M2 — SIGNIFICANT CHANGE UP
GLUCOSE BLDC GLUCOMTR-MCNC: 120 MG/DL — HIGH (ref 70–99)
GLUCOSE BLDC GLUCOMTR-MCNC: 132 MG/DL — HIGH (ref 70–99)
GLUCOSE BLDC GLUCOMTR-MCNC: 139 MG/DL — HIGH (ref 70–99)
GLUCOSE BLDC GLUCOMTR-MCNC: 163 MG/DL — HIGH (ref 70–99)
GLUCOSE SERPL-MCNC: 144 MG/DL — HIGH (ref 70–99)
HCT VFR BLD CALC: 36.2 % — LOW (ref 39–50)
HGB BLD-MCNC: 11.4 G/DL — LOW (ref 13–17)
MAGNESIUM SERPL-MCNC: 1.8 MG/DL — SIGNIFICANT CHANGE UP (ref 1.6–2.6)
MCHC RBC-ENTMCNC: 28 PG — SIGNIFICANT CHANGE UP (ref 27–34)
MCHC RBC-ENTMCNC: 31.5 GM/DL — LOW (ref 32–36)
MCV RBC AUTO: 88.9 FL — SIGNIFICANT CHANGE UP (ref 80–100)
NRBC # BLD: 0 /100 WBCS — SIGNIFICANT CHANGE UP (ref 0–0)
NRBC # FLD: 0 K/UL — SIGNIFICANT CHANGE UP (ref 0–0)
PHOSPHATE SERPL-MCNC: 2.7 MG/DL — SIGNIFICANT CHANGE UP (ref 2.5–4.5)
PLATELET # BLD AUTO: 225 K/UL — SIGNIFICANT CHANGE UP (ref 150–400)
POTASSIUM SERPL-MCNC: 4.1 MMOL/L — SIGNIFICANT CHANGE UP (ref 3.5–5.3)
POTASSIUM SERPL-SCNC: 4.1 MMOL/L — SIGNIFICANT CHANGE UP (ref 3.5–5.3)
PROT SERPL-MCNC: 7.1 G/DL — SIGNIFICANT CHANGE UP (ref 6–8.3)
RBC # BLD: 4.07 M/UL — LOW (ref 4.2–5.8)
RBC # FLD: 13.3 % — SIGNIFICANT CHANGE UP (ref 10.3–14.5)
SODIUM SERPL-SCNC: 136 MMOL/L — SIGNIFICANT CHANGE UP (ref 135–145)
WBC # BLD: 5.97 K/UL — SIGNIFICANT CHANGE UP (ref 3.8–10.5)
WBC # FLD AUTO: 5.97 K/UL — SIGNIFICANT CHANGE UP (ref 3.8–10.5)

## 2022-11-14 PROCEDURE — 71045 X-RAY EXAM CHEST 1 VIEW: CPT | Mod: 26,77

## 2022-11-14 PROCEDURE — 99232 SBSQ HOSP IP/OBS MODERATE 35: CPT

## 2022-11-14 PROCEDURE — 71045 X-RAY EXAM CHEST 1 VIEW: CPT | Mod: 26

## 2022-11-14 RX ORDER — INFLUENZA VIRUS VACCINE 15; 15; 15; 15 UG/.5ML; UG/.5ML; UG/.5ML; UG/.5ML
0.5 SUSPENSION INTRAMUSCULAR ONCE
Refills: 0 | Status: DISCONTINUED | OUTPATIENT
Start: 2022-11-14 | End: 2022-11-15

## 2022-11-14 RX ORDER — OXYCODONE HYDROCHLORIDE 5 MG/1
5 TABLET ORAL EVERY 4 HOURS
Refills: 0 | Status: DISCONTINUED | OUTPATIENT
Start: 2022-11-14 | End: 2022-11-15

## 2022-11-14 RX ORDER — TRAMADOL HYDROCHLORIDE 50 MG/1
50 TABLET ORAL EVERY 4 HOURS
Refills: 0 | Status: DISCONTINUED | OUTPATIENT
Start: 2022-11-14 | End: 2022-11-15

## 2022-11-14 RX ADMIN — Medication 50 MILLIGRAM(S): at 05:18

## 2022-11-14 RX ADMIN — INSULIN GLARGINE 66 UNIT(S): 100 INJECTION, SOLUTION SUBCUTANEOUS at 21:51

## 2022-11-14 RX ADMIN — Medication 0.1 MILLIGRAM(S): at 17:22

## 2022-11-14 RX ADMIN — ATORVASTATIN CALCIUM 10 MILLIGRAM(S): 80 TABLET, FILM COATED ORAL at 21:25

## 2022-11-14 RX ADMIN — TACROLIMUS 1.5 MILLIGRAM(S): 5 CAPSULE ORAL at 05:17

## 2022-11-14 RX ADMIN — Medication 81 MILLIGRAM(S): at 11:10

## 2022-11-14 RX ADMIN — HEPARIN SODIUM 7500 UNIT(S): 5000 INJECTION INTRAVENOUS; SUBCUTANEOUS at 14:31

## 2022-11-14 RX ADMIN — Medication 16 UNIT(S): at 18:07

## 2022-11-14 RX ADMIN — HEPARIN SODIUM 7500 UNIT(S): 5000 INJECTION INTRAVENOUS; SUBCUTANEOUS at 21:24

## 2022-11-14 RX ADMIN — Medication 6 MILLIGRAM(S): at 21:24

## 2022-11-14 RX ADMIN — OXYCODONE HYDROCHLORIDE 5 MILLIGRAM(S): 5 TABLET ORAL at 15:28

## 2022-11-14 RX ADMIN — LIDOCAINE 1 PATCH: 4 CREAM TOPICAL at 10:37

## 2022-11-14 RX ADMIN — OXYCODONE HYDROCHLORIDE 5 MILLIGRAM(S): 5 TABLET ORAL at 16:14

## 2022-11-14 RX ADMIN — OXYCODONE HYDROCHLORIDE 5 MILLIGRAM(S): 5 TABLET ORAL at 05:54

## 2022-11-14 RX ADMIN — Medication 50 MILLIGRAM(S): at 14:31

## 2022-11-14 RX ADMIN — TRAMADOL HYDROCHLORIDE 50 MILLIGRAM(S): 50 TABLET ORAL at 15:17

## 2022-11-14 RX ADMIN — TACROLIMUS 1.5 MILLIGRAM(S): 5 CAPSULE ORAL at 17:21

## 2022-11-14 RX ADMIN — GABAPENTIN 300 MILLIGRAM(S): 400 CAPSULE ORAL at 17:21

## 2022-11-14 RX ADMIN — Medication 0.1 MILLIGRAM(S): at 05:17

## 2022-11-14 RX ADMIN — Medication 50 MILLIGRAM(S): at 17:23

## 2022-11-14 RX ADMIN — AZATHIOPRINE 100 MILLIGRAM(S): 100 TABLET ORAL at 11:10

## 2022-11-14 RX ADMIN — Medication 2: at 12:50

## 2022-11-14 RX ADMIN — TRAMADOL HYDROCHLORIDE 50 MILLIGRAM(S): 50 TABLET ORAL at 14:30

## 2022-11-14 RX ADMIN — Medication 16 UNIT(S): at 12:51

## 2022-11-14 RX ADMIN — Medication 1 TABLET(S): at 11:10

## 2022-11-14 RX ADMIN — Medication 50 MILLIGRAM(S): at 21:25

## 2022-11-14 RX ADMIN — GABAPENTIN 300 MILLIGRAM(S): 400 CAPSULE ORAL at 05:17

## 2022-11-14 RX ADMIN — HEPARIN SODIUM 7500 UNIT(S): 5000 INJECTION INTRAVENOUS; SUBCUTANEOUS at 05:17

## 2022-11-14 NOTE — PROGRESS NOTE ADULT - ASSESSMENT
55yo M w/ DM1 (on insulin pump at home) complicated by CKD/ESRD s/p renal transplant, CVA, neuropathy, PAD s/p amputations, Obesity, HTN, HLD, chronic lung disease/pleural effusion currently admitted for thoracic surgery. Endocrine consulted for assistance with inpatient management of DM 1.    1. DM1 with complications   A1c 7.2%   Home Regimen: Medronic Insulin Pump with Dexcom CGM (see settings below, TDB 72 units)   Continue off home insulin pump for now - has supplies if desiring to resume    While inpatient:   BG target 100-180 mg/dl  Glucose improving into target range today.  Continue Lantus 66 units SQ qHS   Continue Admelog 16 units TID before meals (Hold if NPO/skips meal)   Continue Admelog MODERATE dose correctional scale TID before meals & moderate dose correction scale at bedtime   Check FS before meals and bedtime  Carb consistent diet   Hypoglycemia protocol     Discharge Plan:   Resume Medtronic pump + Dexcom CGM  In regards to timing since patient is receiving Lantus at 10pm, pump could be resumed at 8PM on day of discharge.  For settings, would prefer to add ISF and ICR so patient can bolus properly before meals, however, patient currently with well controlled A1c so he may want to keep settings the same and followup with his outpatient provider - TBD   Note that pump can be resumed 22h after last basal insulin dose, or temp basal rate can be used until Lantus dose wears off. Contact endocrine when discharge planning so proper insulin pump transitioning can occur  Please ensure that patient has DM supplies, ketone strips, glucagon prior to discharge  Patient should obtain medic ID or medical alert device for Type 1 diabetes  Routine outpatient PCP, opthalmology, podiatry and endocrinology followup   Outpatient follow up with patient's private endocrinologist Dr. Jasbir Garcia.      2. Insulin pump status   Medtronic Insulin Pump (with Humalog), also uses Dexcom CGM  Current pump settings per patient: 3 units/hr for full 24 hours (TOTAL BASAL = 72 units)   Does not have set ICR or ISF. He says that he carb counts & boluses using a rough ICR of 1:9/10 (ie. if eating 40 grams of carbs, may bolus 3-4 units) and uses a sliding scale for correction. States maximum he uses is 10 units before meals  Reviewed proper use of bolus wizard, ICR and ISF, patient reports he will think about this    3. HTN  BP goal less than 130/80   On Clonidine, Hydralazine, Metoprolol  Recommend outpatient annual urinary microalb/cr ratio    4. HLD  LDL goal less than 70  LDL 74 as of 8/2022  Currently on Atorvastatin 10 mg daily here, would increase if no contraindications. He is on Lovastatin 40 mg at home    Cathryn Hyatt MD  Division of Endocrinology  Pager: [phone]    If after 6PM or before 9AM, or on weekends/holidays, please call endocrine answering service for assistance ([phone]).  For nonurgent matters email [email or web address] for assistance.

## 2022-11-14 NOTE — PROGRESS NOTE ADULT - SUBJECTIVE AND OBJECTIVE BOX
Patient seen at bedside, resting comfortably, on 3L O2 via NC, in no resp distress.  Patient states he has pain on his right side around the chest tube sites.  States he has been moving but admits it is difficult with the chest tubes in place.  Denies any new or worsening chest pain, SOB, dyspnea, n/v/d, fevers or chills.    Vital Signs:  Vital Signs Last 24 Hrs  T(C): 37.3 (11-14-22 @ 05:00), Max: 37.3 (11-13-22 @ 12:29)  T(F): 99.2 (11-14-22 @ 05:00), Max: 99.2 (11-14-22 @ 00:12)  HR: 82 (11-14-22 @ 05:00) (76 - 86)  BP: 155/60 (11-14-22 @ 05:00) (144/70 - 162/71)  RR: 18 (11-14-22 @ 05:00) (18 - 18)  SpO2: 98% (11-14-22 @ 05:00) (95% - 99%)     Telemetry/Alarms: NSR 90s  General: WN/WD NAD  Neurology: Awake, nonfocal  ENT:Gross hearing intact, no stridor  Respiratory: Breathing comfortably on 3L O2 via NC, no resp distress or accessory muscle use.  CV: RRR  Abdominal: Soft, NT, ND  Extremities: +1 Edema to bilateral lower extremities. Right foot with dressing from home for hx of diabetic foot and wound.   Skin: No Rashes, Hematoma  Psych: Oriented x 3  Incisions: c/d/i  Tubes: R Straight CT to WS, -55cc, no AL.  R Angled CT to WS, -105cc, no AL.      Relevant labs, radiology and Medications reviewed                        11.4   5.97  )-----------( 225      ( 14 Nov 2022 06:04 )             36.2     11-14    136  |  94<L>  |  16  ----------------------------<  144<H>  4.1   |  31  |  1.05    Ca    10.0      14 Nov 2022 06:04  Phos  2.7     11-14  Mg     1.80     11-14    TPro  7.1  /  Alb  3.7  /  TBili  0.4  /  DBili  x   /  AST  9   /  ALT  5   /  AlkPhos  72  11-14      MEDICATIONS  (STANDING):  acetaminophen   IVPB .. 1000 milliGRAM(s) IV Intermittent once  aspirin  chewable 81 milliGRAM(s) Oral daily  atorvastatin 10 milliGRAM(s) Oral at bedtime  azaTHIOprine 100 milliGRAM(s) Oral daily  cloNIDine 0.1 milliGRAM(s) Oral two times a day  dextrose 50% Injectable 25 Gram(s) IV Push once  gabapentin 300 milliGRAM(s) Oral two times a day  heparin   Injectable 7500 Unit(s) SubCutaneous every 8 hours  hydrALAZINE 50 milliGRAM(s) Oral three times a day  insulin glargine Injectable (LANTUS) 66 Unit(s) SubCutaneous at bedtime  insulin lispro (ADMELOG) corrective regimen sliding scale   SubCutaneous three times a day before meals  insulin lispro (ADMELOG) corrective regimen sliding scale   SubCutaneous at bedtime  insulin lispro Injectable (ADMELOG) 16 Unit(s) SubCutaneous three times a day before meals  lidocaine   4% Patch 1 Patch Transdermal every 24 hours  lidocaine   4% Patch 1 Patch Transdermal daily  melatonin 6 milliGRAM(s) Oral at bedtime  metoprolol tartrate 50 milliGRAM(s) Oral two times a day  polyethylene glycol 3350 17 Gram(s) Oral daily  senna 2 Tablet(s) Oral at bedtime  tacrolimus 1.5 milliGRAM(s) Oral every 12 hours  trimethoprim   80 mG/sulfamethoxazole 400 mG 1 Tablet(s) Oral daily    MEDICATIONS  (PRN):  HYDROmorphone  Injectable 0.5 milliGRAM(s) IV Push every 3 hours PRN Severe breakhrough  Pain (7 - 10)  naloxone Injectable 0.1 milliGRAM(s) IV Push every 3 minutes PRN For ANY of the following changes in patient status:  A. RR LESS THAN 10 breaths per minute, B. Oxygen saturation LESS THAN 90%, C. Sedation score of 6  ondansetron Injectable 4 milliGRAM(s) IV Push every 6 hours PRN Nausea  oxyCODONE    IR 5 milliGRAM(s) Oral every 3 hours PRN Moderate Pain (4 - 6)  oxyCODONE    IR 10 milliGRAM(s) Oral every 3 hours PRN Severe Pain (7 - 10)    Pertinent Physical Exam  I&O's Summary    13 Nov 2022 07:01  -  14 Nov 2022 07:00  --------------------------------------------------------  IN: 820 mL / OUT: 2610 mL / NET: -1790 mL        Assessment  57yo M w/ DM1 (on insulin pump at home) complicated by CKD/ESRD s/p renal transplant (2013) on Tacrolimus and Azathioprine, , CVA, diabetic peripheral neuropathy, PAD s/p amputations, Obesity, HTN, HLD, DVT of LLE s/p Eliquis (2016)   quamous cell carcinoma of skin of nose, L arm, and L ear COPD on BiPAP with 2L O2 at night, chronic lung disease/pleural effusion currently s/p Right VATS, drainage of large pleural effusion, pleural biopsy, RLL partial decortication  10-Nov-2022.     Post op course c/b hyperglycemia and HTN. Endocrine consulted for assistance with glucose control.   11/13: Continues to be hyperglycemic with AM sugars in mid 250s, Lantus and Humalog increased yesterday. WIll reassess today. CT to WS.       PLAN  Neuro: Pain management  with PO Tylenol, cont with gabapentin. Cont with clonidine. Right lower foot diabetic wound - wound/endocrine to be cx.   Pulm: Encourage coughing, deep breathing and use of incentive spirometry. Wean off supplemental oxygen as able as patient using 3L during day. No BiPAP at night during stay as patient has refused. Cont with chest PT. Daily CXR.   Cardio: Monitor telemetry/alarms. Resume blood pressure medications as able. Continue with statin, ASA, BB.   GI: Tolerating diet, glucose control. Continue stool softeners.  Renal: monitor urine output. Hx of Renal trasnsplant. Cont with Tacrolimus. Cr stable.   Vasc: Heparin SC/SCDs for DVT prophylaxis  Heme: Stable H/H.   Endocrine: Blood glucose checks per routine. Endocrine following for hyperglycemia. Lantus and Humalog changes made. Still elevated AM sugar. F/U with endocrine, can do Lantus BID if necessary.  ID: PPX bactrim. Stable.  Therapy: OOB/ambulate. PT no needs. Uses boot for diabetic foot with wound dressing changes at home by himself every two days. Wound vs Change at bedside to assess.   Tubes: Monitor Chest tube output. Both chest tubes on WS. Possible d/c straight chest tube today. Daily CXR.  Disposition: Aim to D/C to home once CTs removed.  Discussed with Cardiothoracic Team at AM rounds. Patient seen at bedside, resting comfortably, on 3L O2 via NC, in no resp distress.  Patient states he has pain on his right side around the chest tube sites.  States he has been moving but admits it is difficult with the chest tubes in place.  Denies any new or worsening chest pain, SOB, dyspnea, n/v/d, fevers or chills.    Straight CT removed at bedside, will f/u CXR.    Vital Signs:  Vital Signs Last 24 Hrs  T(C): 37.3 (11-14-22 @ 05:00), Max: 37.3 (11-13-22 @ 12:29)  T(F): 99.2 (11-14-22 @ 05:00), Max: 99.2 (11-14-22 @ 00:12)  HR: 82 (11-14-22 @ 05:00) (76 - 86)  BP: 155/60 (11-14-22 @ 05:00) (144/70 - 162/71)  RR: 18 (11-14-22 @ 05:00) (18 - 18)  SpO2: 98% (11-14-22 @ 05:00) (95% - 99%)     Telemetry/Alarms: NSR 90s  General: WN/WD NAD  Neurology: Awake, nonfocal  ENT:Gross hearing intact, no stridor  Respiratory: Breathing comfortably on 3L O2 via NC, no resp distress or accessory muscle use.  CV: RRR  Abdominal: Soft, NT, ND  Extremities: +1 Edema to bilateral lower extremities. Right foot with dressing from home for hx of diabetic foot and wound.   Skin: No Rashes, Hematoma  Psych: Oriented x 3  Incisions: c/d/i  Tubes: R Straight CT d/c today.  R Angled CT to WS, -105cc, no AL.      Relevant labs, radiology and Medications reviewed                        11.4   5.97  )-----------( 225      ( 14 Nov 2022 06:04 )             36.2     11-14    136  |  94<L>  |  16  ----------------------------<  144<H>  4.1   |  31  |  1.05    Ca    10.0      14 Nov 2022 06:04  Phos  2.7     11-14  Mg     1.80     11-14    TPro  7.1  /  Alb  3.7  /  TBili  0.4  /  DBili  x   /  AST  9   /  ALT  5   /  AlkPhos  72  11-14      MEDICATIONS  (STANDING):  acetaminophen   IVPB .. 1000 milliGRAM(s) IV Intermittent once  aspirin  chewable 81 milliGRAM(s) Oral daily  atorvastatin 10 milliGRAM(s) Oral at bedtime  azaTHIOprine 100 milliGRAM(s) Oral daily  cloNIDine 0.1 milliGRAM(s) Oral two times a day  dextrose 50% Injectable 25 Gram(s) IV Push once  gabapentin 300 milliGRAM(s) Oral two times a day  heparin   Injectable 7500 Unit(s) SubCutaneous every 8 hours  hydrALAZINE 50 milliGRAM(s) Oral three times a day  insulin glargine Injectable (LANTUS) 66 Unit(s) SubCutaneous at bedtime  insulin lispro (ADMELOG) corrective regimen sliding scale   SubCutaneous three times a day before meals  insulin lispro (ADMELOG) corrective regimen sliding scale   SubCutaneous at bedtime  insulin lispro Injectable (ADMELOG) 16 Unit(s) SubCutaneous three times a day before meals  lidocaine   4% Patch 1 Patch Transdermal every 24 hours  lidocaine   4% Patch 1 Patch Transdermal daily  melatonin 6 milliGRAM(s) Oral at bedtime  metoprolol tartrate 50 milliGRAM(s) Oral two times a day  polyethylene glycol 3350 17 Gram(s) Oral daily  senna 2 Tablet(s) Oral at bedtime  tacrolimus 1.5 milliGRAM(s) Oral every 12 hours  trimethoprim   80 mG/sulfamethoxazole 400 mG 1 Tablet(s) Oral daily    MEDICATIONS  (PRN):  HYDROmorphone  Injectable 0.5 milliGRAM(s) IV Push every 3 hours PRN Severe breakhrough  Pain (7 - 10)  naloxone Injectable 0.1 milliGRAM(s) IV Push every 3 minutes PRN For ANY of the following changes in patient status:  A. RR LESS THAN 10 breaths per minute, B. Oxygen saturation LESS THAN 90%, C. Sedation score of 6  ondansetron Injectable 4 milliGRAM(s) IV Push every 6 hours PRN Nausea  oxyCODONE    IR 5 milliGRAM(s) Oral every 3 hours PRN Moderate Pain (4 - 6)  oxyCODONE    IR 10 milliGRAM(s) Oral every 3 hours PRN Severe Pain (7 - 10)    Pertinent Physical Exam  I&O's Summary    13 Nov 2022 07:01  -  14 Nov 2022 07:00  --------------------------------------------------------  IN: 820 mL / OUT: 2610 mL / NET: -1790 mL        Assessment  55yo M w/ DM1 (on insulin pump at home) complicated by CKD/ESRD s/p renal transplant (2013) on Tacrolimus and Azathioprine, , CVA, diabetic peripheral neuropathy, PAD s/p amputations, Obesity, HTN, HLD, DVT of LLE s/p Eliquis (2016)   quamous cell carcinoma of skin of nose, L arm, and L ear COPD on BiPAP with 2L O2 at night, chronic lung disease/pleural effusion currently s/p Right VATS, drainage of large pleural effusion, pleural biopsy, RLL partial decortication  10-Nov-2022.     Post op course c/b hyperglycemia and HTN. Endocrine consulted for assistance with glucose control.   11/13: Continues to be hyperglycemic with AM sugars in mid 250s, Lantus and Humalog increased yesterday. WIll reassess today. CT to WS.   11/14: R straight chest tube removed today, f/u CXR      PLAN  Neuro: Pain management  with PO Tylenol, cont with gabapentin. Cont with clonidine. Right lower foot diabetic wound - wound/endocrine to be cx.   Pulm: Encourage coughing, deep breathing and use of incentive spirometry. Wean off supplemental oxygen as able as patient using 3L during day. No BiPAP at night during stay as patient has refused. Cont with chest PT. Daily CXR.   Cardio: Monitor telemetry/alarms. Resume blood pressure medications as able. Continue with statin, ASA, BB.   GI: Tolerating diet, glucose control. Continue stool softeners.  Renal: monitor urine output. Hx of Renal trasnsplant. Cont with Tacrolimus. Cr stable.   Vasc: Heparin SC/SCDs for DVT prophylaxis  Heme: Stable H/H.   Endocrine: Blood glucose checks per routine. Endocrine following for hyperglycemia. Lantus and Humalog changes made. Still elevated AM sugar. F/U with endocrine, can do Lantus BID if necessary.  ID: PPX bactrim. Stable.  Therapy: OOB/ambulate. PT no needs. Uses boot for diabetic foot with wound dressing changes at home by himself every two days. Wound vs Change at bedside to assess.   Tubes: Monitor Chest tube output. Both chest tubes on WS. Striaght tube d/c.d today. Daily CXR.  Disposition: Aim to D/C to home once CTs removed.  Discussed with Cardiothoracic Team at AM rounds.

## 2022-11-14 NOTE — PROGRESS NOTE ADULT - SUBJECTIVE AND OBJECTIVE BOX
Chief Complaint: Type 1 DM    History: tolerating po intake  denies hypoglycemia symptoms or events    MEDICATIONS  (STANDING):  acetaminophen   IVPB .. 1000 milliGRAM(s) IV Intermittent once  aspirin  chewable 81 milliGRAM(s) Oral daily  atorvastatin 10 milliGRAM(s) Oral at bedtime  azaTHIOprine 100 milliGRAM(s) Oral daily  cloNIDine 0.1 milliGRAM(s) Oral two times a day  dextrose 50% Injectable 25 Gram(s) IV Push once  gabapentin 300 milliGRAM(s) Oral two times a day  heparin   Injectable 7500 Unit(s) SubCutaneous every 8 hours  hydrALAZINE 50 milliGRAM(s) Oral three times a day  influenza   Vaccine 0.5 milliLiter(s) IntraMuscular once  insulin glargine Injectable (LANTUS) 66 Unit(s) SubCutaneous at bedtime  insulin lispro (ADMELOG) corrective regimen sliding scale   SubCutaneous three times a day before meals  insulin lispro (ADMELOG) corrective regimen sliding scale   SubCutaneous at bedtime  insulin lispro Injectable (ADMELOG) 16 Unit(s) SubCutaneous three times a day before meals  lidocaine   4% Patch 1 Patch Transdermal daily  lidocaine   4% Patch 1 Patch Transdermal every 24 hours  melatonin 6 milliGRAM(s) Oral at bedtime  metoprolol tartrate 50 milliGRAM(s) Oral two times a day  polyethylene glycol 3350 17 Gram(s) Oral daily  senna 2 Tablet(s) Oral at bedtime  tacrolimus 1.5 milliGRAM(s) Oral every 12 hours  trimethoprim   80 mG/sulfamethoxazole 400 mG 1 Tablet(s) Oral daily    MEDICATIONS  (PRN):  HYDROmorphone  Injectable 0.5 milliGRAM(s) IV Push every 3 hours PRN Severe breakhrough  Pain (7 - 10)  naloxone Injectable 0.1 milliGRAM(s) IV Push every 3 minutes PRN For ANY of the following changes in patient status:  A. RR LESS THAN 10 breaths per minute, B. Oxygen saturation LESS THAN 90%, C. Sedation score of 6  ondansetron Injectable 4 milliGRAM(s) IV Push every 6 hours PRN Nausea  oxyCODONE    IR 5 milliGRAM(s) Oral every 3 hours PRN Moderate Pain (4 - 6)  oxyCODONE    IR 10 milliGRAM(s) Oral every 3 hours PRN Severe Pain (7 - 10)      Allergies    No Known Allergies    Intolerances      Review of Systems:    ALL OTHER SYSTEMS REVIEWED AND NEGATIVE      PHYSICAL EXAM:  VITALS: T(C): 36.6 (11-14-22 @ 10:20)  T(F): 97.8 (11-14-22 @ 10:20), Max: 99.2 (11-14-22 @ 00:12)  HR: 75 (11-14-22 @ 10:20) (75 - 86)  BP: 156/77 (11-14-22 @ 10:20) (144/70 - 157/72)  RR:  (17 - 18)  SpO2:  (95% - 98%)  Wt(kg): --  GENERAL: NAD, well-groomed, well-developed  EYES: No proptosis, no lid lag, anicteric  HEENT:  Atraumatic, Normocephalic, moist mucous membranes  RESPIRATORY: nonlabored respirations  SKIN: chest tubes in place  PSYCH: Alert and oriented x 3, normal affect, normal mood    CAPILLARY BLOOD GLUCOSE      POCT Blood Glucose.: 163 mg/dL (14 Nov 2022 12:10)  POCT Blood Glucose.: 139 mg/dL (14 Nov 2022 08:48)  POCT Blood Glucose.: 158 mg/dL (13 Nov 2022 22:00)  POCT Blood Glucose.: 166 mg/dL (13 Nov 2022 18:14)  POCT Blood Glucose.: 201 mg/dL (13 Nov 2022 13:17)      11-14    136  |  94<L>  |  16  ----------------------------<  144<H>  4.1   |  31  |  1.05    eGFR: 84    Ca    10.0      11-14  Mg     1.80     11-14  Phos  2.7     11-14    TPro  7.1  /  Alb  3.7  /  TBili  0.4  /  DBili  x   /  AST  9   /  ALT  5   /  AlkPhos  72  11-14      A1C with Estimated Average Glucose Result: 7.2 % (11-01-22 @ 15:00)  A1C with Estimated Average Glucose Result: 8.6 % (08-13-22 @ 06:01)  A1C with Estimated Average Glucose Result: 10.2 % (05-11-22 @ 17:39)  A1C with Estimated Average Glucose Result: 10.4 % (05-10-22 @ 23:22)  A1C with Estimated Average Glucose Result: 9.4 % (02-10-22 @ 12:00)      Thyroid Function Tests:

## 2022-11-15 ENCOUNTER — TRANSCRIPTION ENCOUNTER (OUTPATIENT)
Age: 57
End: 2022-11-15

## 2022-11-15 VITALS
DIASTOLIC BLOOD PRESSURE: 72 MMHG | SYSTOLIC BLOOD PRESSURE: 135 MMHG | TEMPERATURE: 98 F | HEART RATE: 79 BPM | OXYGEN SATURATION: 95 % | RESPIRATION RATE: 18 BRPM

## 2022-11-15 LAB
ANION GAP SERPL CALC-SCNC: 11 MMOL/L — SIGNIFICANT CHANGE UP (ref 7–14)
BUN SERPL-MCNC: 17 MG/DL — SIGNIFICANT CHANGE UP (ref 7–23)
CALCIUM SERPL-MCNC: 9.8 MG/DL — SIGNIFICANT CHANGE UP (ref 8.4–10.5)
CHLORIDE SERPL-SCNC: 96 MMOL/L — LOW (ref 98–107)
CO2 SERPL-SCNC: 34 MMOL/L — HIGH (ref 22–31)
CREAT SERPL-MCNC: 1.08 MG/DL — SIGNIFICANT CHANGE UP (ref 0.5–1.3)
EGFR: 81 ML/MIN/1.73M2 — SIGNIFICANT CHANGE UP
GLUCOSE BLDC GLUCOMTR-MCNC: 104 MG/DL — HIGH (ref 70–99)
GLUCOSE BLDC GLUCOMTR-MCNC: 179 MG/DL — HIGH (ref 70–99)
GLUCOSE SERPL-MCNC: 104 MG/DL — HIGH (ref 70–99)
HCT VFR BLD CALC: 38.6 % — LOW (ref 39–50)
HGB BLD-MCNC: 12.3 G/DL — LOW (ref 13–17)
MAGNESIUM SERPL-MCNC: 1.7 MG/DL — SIGNIFICANT CHANGE UP (ref 1.6–2.6)
MCHC RBC-ENTMCNC: 28 PG — SIGNIFICANT CHANGE UP (ref 27–34)
MCHC RBC-ENTMCNC: 31.9 GM/DL — LOW (ref 32–36)
MCV RBC AUTO: 87.9 FL — SIGNIFICANT CHANGE UP (ref 80–100)
NRBC # BLD: 0 /100 WBCS — SIGNIFICANT CHANGE UP (ref 0–0)
NRBC # FLD: 0 K/UL — SIGNIFICANT CHANGE UP (ref 0–0)
PHOSPHATE SERPL-MCNC: 4 MG/DL — SIGNIFICANT CHANGE UP (ref 2.5–4.5)
PLATELET # BLD AUTO: 241 K/UL — SIGNIFICANT CHANGE UP (ref 150–400)
POTASSIUM SERPL-MCNC: 3.9 MMOL/L — SIGNIFICANT CHANGE UP (ref 3.5–5.3)
POTASSIUM SERPL-SCNC: 3.9 MMOL/L — SIGNIFICANT CHANGE UP (ref 3.5–5.3)
RBC # BLD: 4.39 M/UL — SIGNIFICANT CHANGE UP (ref 4.2–5.8)
RBC # FLD: 13.6 % — SIGNIFICANT CHANGE UP (ref 10.3–14.5)
SODIUM SERPL-SCNC: 141 MMOL/L — SIGNIFICANT CHANGE UP (ref 135–145)
WBC # BLD: 5.79 K/UL — SIGNIFICANT CHANGE UP (ref 3.8–10.5)
WBC # FLD AUTO: 5.79 K/UL — SIGNIFICANT CHANGE UP (ref 3.8–10.5)

## 2022-11-15 PROCEDURE — 71045 X-RAY EXAM CHEST 1 VIEW: CPT | Mod: 26

## 2022-11-15 PROCEDURE — 99232 SBSQ HOSP IP/OBS MODERATE 35: CPT

## 2022-11-15 RX ORDER — POLYETHYLENE GLYCOL 3350 17 G/17G
17 POWDER, FOR SOLUTION ORAL
Qty: 0 | Refills: 0 | DISCHARGE
Start: 2022-11-15

## 2022-11-15 RX ORDER — SENNA PLUS 8.6 MG/1
2 TABLET ORAL
Qty: 0 | Refills: 0 | DISCHARGE
Start: 2022-11-15

## 2022-11-15 RX ADMIN — Medication 16 UNIT(S): at 08:40

## 2022-11-15 RX ADMIN — GABAPENTIN 300 MILLIGRAM(S): 400 CAPSULE ORAL at 05:14

## 2022-11-15 RX ADMIN — Medication 2: at 12:56

## 2022-11-15 RX ADMIN — HEPARIN SODIUM 7500 UNIT(S): 5000 INJECTION INTRAVENOUS; SUBCUTANEOUS at 05:13

## 2022-11-15 RX ADMIN — Medication 50 MILLIGRAM(S): at 05:14

## 2022-11-15 RX ADMIN — Medication 1 TABLET(S): at 13:55

## 2022-11-15 RX ADMIN — HEPARIN SODIUM 7500 UNIT(S): 5000 INJECTION INTRAVENOUS; SUBCUTANEOUS at 13:54

## 2022-11-15 RX ADMIN — Medication 81 MILLIGRAM(S): at 13:54

## 2022-11-15 RX ADMIN — TACROLIMUS 1.5 MILLIGRAM(S): 5 CAPSULE ORAL at 05:14

## 2022-11-15 RX ADMIN — Medication 16 UNIT(S): at 12:56

## 2022-11-15 RX ADMIN — Medication 0.1 MILLIGRAM(S): at 05:14

## 2022-11-15 RX ADMIN — Medication 50 MILLIGRAM(S): at 13:55

## 2022-11-15 RX ADMIN — AZATHIOPRINE 100 MILLIGRAM(S): 100 TABLET ORAL at 13:55

## 2022-11-15 NOTE — PROGRESS NOTE ADULT - SUBJECTIVE AND OBJECTIVE BOX
Chief Complaint: DM 1     History: Patient seen at bedside. Reports he is eating meals, denies n/v, denies s/s of hypoglycemia  Patient pending discharge today per primary team. Patient last received Lantus dose last night at bedtime. Discussed that he can resume his insulin pump at home starting at 8 PM.  Patient reports he has all supplies for pump and CGM, has insulin at home for pump   In regards to dinner meal, discussed options of: temp basal until 8 pm OR putting pump on for dinner/taking it off until 8 pm OR taking 1x injection of rapid acting insulin with vial and syringe for dinner (16 units) - patient chose this option of taking a one time injection for pre-meal coverage  Patient reports he recently saw his endocrinologist, regularly follows up every 3 months. Reviewed discussing ISF and ICR with endocrinologist - patient reports understanding    MEDICATIONS  (STANDING):  acetaminophen   IVPB .. 1000 milliGRAM(s) IV Intermittent once  aspirin  chewable 81 milliGRAM(s) Oral daily  atorvastatin 10 milliGRAM(s) Oral at bedtime  azaTHIOprine 100 milliGRAM(s) Oral daily  cloNIDine 0.1 milliGRAM(s) Oral two times a day  dextrose 50% Injectable 25 Gram(s) IV Push once  gabapentin 300 milliGRAM(s) Oral two times a day  heparin   Injectable 7500 Unit(s) SubCutaneous every 8 hours  hydrALAZINE 50 milliGRAM(s) Oral three times a day  influenza   Vaccine 0.5 milliLiter(s) IntraMuscular once  insulin glargine Injectable (LANTUS) 66 Unit(s) SubCutaneous at bedtime  insulin lispro (ADMELOG) corrective regimen sliding scale   SubCutaneous three times a day before meals  insulin lispro (ADMELOG) corrective regimen sliding scale   SubCutaneous at bedtime  insulin lispro Injectable (ADMELOG) 16 Unit(s) SubCutaneous three times a day before meals  lidocaine   4% Patch 1 Patch Transdermal daily  melatonin 6 milliGRAM(s) Oral at bedtime  metoprolol tartrate 50 milliGRAM(s) Oral two times a day  polyethylene glycol 3350 17 Gram(s) Oral daily  senna 2 Tablet(s) Oral at bedtime  tacrolimus 1.5 milliGRAM(s) Oral every 12 hours  trimethoprim   80 mG/sulfamethoxazole 400 mG 1 Tablet(s) Oral daily    MEDICATIONS  (PRN):  naloxone Injectable 0.1 milliGRAM(s) IV Push every 3 minutes PRN For ANY of the following changes in patient status:  A. RR LESS THAN 10 breaths per minute, B. Oxygen saturation LESS THAN 90%, C. Sedation score of 6  ondansetron Injectable 4 milliGRAM(s) IV Push every 6 hours PRN Nausea  oxyCODONE    IR 5 milliGRAM(s) Oral every 4 hours PRN Severe Pain (7 - 10)  traMADol 50 milliGRAM(s) Oral every 4 hours PRN Moderate Pain (4 - 6)    No Known Allergies    Review of Systems:  Cardiovascular: No chest pain  Respiratory: No SOB  GI: No nausea, vomiting  Endocrine: no hypoglycemia     PHYSICAL EXAM:  VITALS: T(C): 36.7 (11-15-22 @ 12:04)  T(F): 98.1 (11-15-22 @ 12:04), Max: 98.4 (11-15-22 @ 08:15)  HR: 79 (11-15-22 @ 12:04) (68 - 83)  BP: 135/72 (11-15-22 @ 12:04) (135/72 - 179/69)  RR:  (17 - 19)  SpO2:  (95% - 97%)  Wt(kg): --  GENERAL: NAD  EYES: No proptosis, no lid lag, anicteric  HEENT:  Atraumatic, Normocephalic, moist mucous membranes  RESPIRATORY: unlabored respirations     CAPILLARY BLOOD GLUCOSE    POCT Blood Glucose.: 179 mg/dL (15 Nov 2022 12:28)  POCT Blood Glucose.: 104 mg/dL (15 Nov 2022 08:15)  POCT Blood Glucose.: 132 mg/dL (14 Nov 2022 21:17)  POCT Blood Glucose.: 120 mg/dL (14 Nov 2022 17:38)      11-15    141  |  96<L>  |  17  ----------------------------<  104<H>  3.9   |  34<H>  |  1.08    eGFR: 81    Ca    9.8      11-15  Mg     1.70     11-15  Phos  4.0     11-15    TPro  7.1  /  Alb  3.7  /  TBili  0.4  /  DBili  x   /  AST  9   /  ALT  5   /  AlkPhos  72  11-14      A1C with Estimated Average Glucose Result: 7.2 % (11-01-22 @ 15:00)  A1C with Estimated Average Glucose Result: 8.6 % (08-13-22 @ 06:01)  A1C with Estimated Average Glucose Result: 10.2 % (05-11-22 @ 17:39)  A1C with Estimated Average Glucose Result: 10.4 % (05-10-22 @ 23:22)  A1C with Estimated Average Glucose Result: 9.4 % (02-10-22 @ 12:00)    Diet, Renal Restrictions:   For patients receiving Renal Replacement - No Protein Restr, No Conc K, No Conc Phos, Low Sodium  Consistent Carbohydrate No Snacks (CSTCHO) (11-10-22 @ 16:59) [Active]

## 2022-11-15 NOTE — DISCHARGE NOTE NURSING/CASE MANAGEMENT/SOCIAL WORK - NSDCVIVACCINE_GEN_ALL_CORE_FT
influenza, injectable, quadrivalent, preservative free; 27-Nov-2015 13:32; Jv Zhu (RN); [employer]; [id number]; IntraMuscular; Deltoid Right.; 0.5 milliLiter(s); VIS (VIS Published: 07-Aug-2015, VIS Presented: 27-Nov-2015);   influenza, injectable, quadrivalent, preservative free; 22-Sep-2021 13:06; Eugene Mccartyh (RN); [employer]; [id number] (Exp. Date: 30-Jun-2022); IntraMuscular; Deltoid Left.; 0.5 milliLiter(s); VIS (VIS Published: 15-Aug-2019, VIS Presented: 22-Sep-2021);

## 2022-11-15 NOTE — DISCHARGE NOTE PROVIDER - CARE PROVIDER_API CALL
Donte Kaur)  Surgery; Thoracic Surgery  [phone] [place] [address], [place] [address]  Phone: [phone]  Fax: [phone]  Established Patient  Follow Up Time: 1 week

## 2022-11-15 NOTE — DISCHARGE NOTE NURSING/CASE MANAGEMENT/SOCIAL WORK - NSDCFUADDAPPT_GEN_ALL_CORE_FT
See Dr Kaur in about 7 to 10 days- call for an appointment and bring a new chest X-ray with you when you come.  See your Endocrinologist as soon as possible to have your insulin pump checked

## 2022-11-15 NOTE — DISCHARGE NOTE NURSING/CASE MANAGEMENT/SOCIAL WORK - NSDCPEFALRISK_GEN_ALL_CORE
For information on Fall & Injury Prevention, visit: [email or web address] OR  [email or web address] OR  https://www.cdc.gov/steadi/patient.html

## 2022-11-15 NOTE — DISCHARGE NOTE PROVIDER - HOSPITAL COURSE
55 yo male with right pleural effusion had thoracentesis 6/2022 and 8/2022.  He underwent a FB, right VATS, drainage of pleural fluid, pleural bx, partial RLL decortication on 11/10/22.  Post-op, endocrine followed him for blood sugar control.  One chest tube was removed on 11/14.  He was discharged home after the second tube was removed. 57 yo male with right pleural effusion had thoracentesis 6/2022 and 8/2022.  He underwent a FB, right VATS, drainage of pleural fluid, pleural bx, partial RLL decortication on 11/10/22.  Post-op, endocrine followed him for blood sugar control. Patient has home O2 already. One chest tube was removed on 11/14.  He had his second chest tube removed on 11/15 and f/u CXR was stable. Endocrine was called and gave instructions on restarting the patients insulin pump. Once all instructions were given and understood, he was cleared for discharge home by Dr. Kaur.    Vital Signs Last 24 Hrs  T(C): 36.9 (15 Nov 2022 08:15), Max: 37.2 (14 Nov 2022 14:00)  T(F): 98.4 (15 Nov 2022 08:15), Max: 98.9 (14 Nov 2022 14:00)  HR: 74 (15 Nov 2022 08:15) (68 - 83)  BP: 144/71 (15 Nov 2022 08:15) (137/70 - 179/69)  BP(mean): 87 (14 Nov 2022 19:04) (87 - 96)  RR: 19 (15 Nov 2022 08:15) (17 - 19)  SpO2: 97% (15 Nov 2022 08:15) (95% - 97%)    Parameters below as of 15 Nov 2022 08:15  Patient On (Oxygen Delivery Method): nasal cannula  O2 Flow (L/min): 2      General: A&Ox3, NAD  HEENT: Normocephalic. Vision and hearing grossly intact, EOMI. Airway grossly patent, no stridor.  CV: S1S2, RRR, well perfused  Resp: Breathing comfortably on 2L O2, no resp distress, no accessory muscle use  GI: Soft, NT, ND, +BS  MSK: BOO x4. R foot with dressing and known diabetic ulcer  Pysch: Appropriate affect  Incisions: c/d/i, 2 sutures in place from chest tube sites                          12.3   5.79  )-----------( 241      ( 15 Nov 2022 06:15 )             38.6       11-15    141  |  96<L>  |  17  ----------------------------<  104<H>  3.9   |  34<H>  |  1.08    Ca    9.8      15 Nov 2022 06:15  Phos  4.0     11-15  Mg     1.70     11-15    TPro  7.1  /  Alb  3.7  /  TBili  0.4  /  DBili  x   /  AST  9   /  ALT  5   /  AlkPhos  72  11-14

## 2022-11-15 NOTE — DISCHARGE NOTE PROVIDER - NSDCMRMEDTOKEN_GEN_ALL_CORE_FT
aspirin 81 mg oral tablet: 1 tab(s) orally once a day  azaTHIOprine 100 mg oral tablet: 1 tab(s) orally once a day, am  Bactrim 400 mg-80 mg oral tablet: 1 tab(s) orally once a day, am  cloNIDine 0.1 mg oral tablet: 1 tab(s) orally 2 times a day  gabapentin 300 mg oral capsule: 1 tab(s) orally 2 times a day  Humalog pump: 3 unit(s) per hour 24hrs/day with 3 bolus with meals.   hydrALAZINE 50 mg oral tablet: 1 tab(s) orally 3 times a day  lovastatin 40 mg oral tablet: 1 tab(s) orally once a day, pm  metoprolol tartrate 75 mg oral tablet: 1 tab(s) orally 2 times a day  tacrolimus: 1.5 milligram(s) orally 2 times a day   aspirin 81 mg oral tablet: 1 tab(s) orally once a day  azaTHIOprine 100 mg oral tablet: 1 tab(s) orally once a day, am  Bactrim 400 mg-80 mg oral tablet: 1 tab(s) orally once a day, am  cloNIDine 0.1 mg oral tablet: 1 tab(s) orally 2 times a day  gabapentin 300 mg oral capsule: 1 tab(s) orally 2 times a day  Humalog pump: 3 unit(s) per hour 24hrs/day with 3 bolus with meals.   hydrALAZINE 50 mg oral tablet: 1 tab(s) orally 3 times a day  lovastatin 40 mg oral tablet: 1 tab(s) orally once a day, pm  metoprolol tartrate 75 mg oral tablet: 1 tab(s) orally 2 times a day  polyethylene glycol 3350 oral powder for reconstitution: 17 gram(s) orally once a day  senna leaf extract oral tablet: 2 tab(s) orally once a day (at bedtime)  tacrolimus: 1.5 milligram(s) orally 2 times a day   aspirin 81 mg oral tablet: 1 tab(s) orally once a day  azaTHIOprine 100 mg oral tablet: 1 tab(s) orally once a day, am  Bactrim 400 mg-80 mg oral tablet: 1 tab(s) orally once a day, am  cloNIDine 0.1 mg oral tablet: 1 tab(s) orally 2 times a day  gabapentin 300 mg oral capsule: 1 tab(s) orally 2 times a day  Humalog pump: 3 unit(s) per hour 24hrs/day with 3 bolus with meals.   hydrALAZINE 50 mg oral tablet: 1 tab(s) orally 3 times a day  lovastatin 40 mg oral tablet: 1 tab(s) orally once a day, pm  metoprolol tartrate 75 mg oral tablet: 1 tab(s) orally 2 times a day  polyethylene glycol 3350 oral powder for reconstitution: 17 gram(s) orally once a day  senna leaf extract oral tablet: 2 tab(s) orally once a day (at bedtime)  tacrolimus: 1.5 milligram(s) orally 2 times a day  Tylenol 325 mg oral tablet: 2 tab(s) orally every 6 hours, As Needed

## 2022-11-15 NOTE — DISCHARGE NOTE PROVIDER - NSDCCPTREATMENT_GEN_ALL_CORE_FT
PRINCIPAL PROCEDURE  Procedure: VATS, with partial lung decortication  Findings and Treatment: RLL      SECONDARY PROCEDURE  Procedure: VATS, with pleural biopsy  Findings and Treatment: Right    Procedure: VATS, with chest tube insertion for drainage of pleural effusion  Findings and Treatment:     Procedure: Bronchoscopy, flexible, by CT surgery  Findings and Treatment:

## 2022-11-15 NOTE — PROGRESS NOTE ADULT - PROBLEM SELECTOR PROBLEM 1
Type 1 diabetes mellitus with complications

## 2022-11-15 NOTE — PROGRESS NOTE ADULT - ASSESSMENT
55yo M w/ DM1 (on insulin pump at home) complicated by CKD/ESRD s/p renal transplant, CVA, neuropathy, PAD s/p amputations, Obesity, HTN, HLD, chronic lung disease/pleural effusion currently admitted for thoracic surgery. Endocrine consulted for assistance with inpatient management of DM 1.    1. DM1 with complications   A1c 7.2%   Home Regimen: Medronic Insulin Pump with Dexcom CGM (see settings below, TDB 72 units)   Continue off home insulin pump for now - has supplies if desiring to resume    While inpatient:   BG target 100-180 mg/dl  Continue Lantus 66 units SQ qHS   Continue Admelog 16 units TID before meals (Hold if NPO/skips meal)   Continue Admelog MODERATE dose correctional scale TID before meals & moderate dose correction scale at bedtime   Check FS before meals and bedtime  Carb consistent diet   Hypoglycemia protocol     Discharge Plan:   Resume Medtronic pump + Dexcom CGM  In regards to timing since patient is receiving Lantus at 10pm, pump could be resumed at 8PM on day of discharge. Take 1x dose of Humalog 16 units (with vial and syringe) for dinner tonight for pre-meal dose. Then resume pump fully at 8 pm  For settings, would prefer to add ISF and ICR so patient can bolus properly before meals, however, patient currently with well controlled A1c so he would like to keep settings the same and followup with his outpatient provider   Please ensure that patient has DM supplies, ketone strips, glucagon prior to discharge  Patient should obtain medic ID or medical alert device for Type 1 diabetes  Routine outpatient PCP, opthalmology, podiatry and endocrinology followup   Outpatient follow up with patient's private endocrinologist Dr. Jasbir Garcia.      2. Insulin pump status   Medtronic Insulin Pump (with Humalog), also uses Dexcom CGM  Current pump settings per patient: 3 units/hr for full 24 hours (TOTAL BASAL = 72 units)   Does not have set ICR or ISF. He says that he carb counts & boluses using a rough ICR of 1:9/10 (ie. if eating 40 grams of carbs, may bolus 3-4 units) and uses a sliding scale for correction. States maximum he uses is 10 units before meals  Reviewed proper use of bolus wizard, ICR and ISF, patient reports he will think about this    3. HTN  BP goal less than 130/80   On Clonidine, Hydralazine, Metoprolol  Recommend outpatient annual urinary microalb/cr ratio    4. HLD  LDL goal less than 70  LDL 74 as of 8/2022  Currently on Atorvastatin 10 mg daily here, would increase if no contraindications. He is on Lovastatin 40 mg at home    Gretchen Roberts  Nurse Practitioner  Division of Endocrinology & Diabetes  In house pager #[phone]/long range pager #[phone]    If before 9AM or after 6PM, or on weekends/holidays, please call endocrine answering service for assistance ([phone]).  For nonurgent matters email [email or web address] for assistance.

## 2022-11-15 NOTE — DISCHARGE NOTE PROVIDER - NSDCFUADDAPPT_GEN_ALL_CORE_FT
See Dr Kaur in about 7 to 10 days- call for an appointment and bring a new chest X-ray with you when you come.    See your Endocrinologist as soon as possible to have your insulin pump checked  See Dr Kaur in about 7 to 10 days- call for an appointment and bring a new chest X-ray with you when you come.  See your Endocrinologist as soon as possible to have your insulin pump checked

## 2022-11-15 NOTE — DISCHARGE NOTE PROVIDER - NSDCFUSCHEDAPPT_GEN_ALL_CORE_FT
José Kelly  [facility]  CARDIOLOGY [address] [facility]  Scheduled Appointment: 12/06/2022

## 2022-11-15 NOTE — DISCHARGE NOTE PROVIDER - NSDCFUADDINST_GEN_ALL_CORE_FT
Keep the chest tube dressings in place for two days and then remove them so that you can shower.  Wash with soap and water and leave open to air to dry.  Some drainage is normal but watch for pus, increased redness, fevers, or difficulty breathing and if noted, call Dr Kaur. You may remove your chest tube dressing and shower normally.  Wash with soap and water, do not scrub, pat dry and you may leave it open to air.  Some drainage is normal but watch for pus, increased redness, fevers, or difficulty breathing. Watch for new onset fevers, chills, shortness of breath and if noted, call Dr Kuar.

## 2022-11-15 NOTE — DISCHARGE NOTE PROVIDER - NSDCCPCAREPLAN_GEN_ALL_CORE_FT
PRINCIPAL DISCHARGE DIAGNOSIS  Diagnosis: Pleural effusion  Assessment and Plan of Treatment:       SECONDARY DISCHARGE DIAGNOSES  Diagnosis: Diabetes  Assessment and Plan of Treatment:

## 2022-11-15 NOTE — DISCHARGE NOTE NURSING/CASE MANAGEMENT/SOCIAL WORK - PATIENT PORTAL LINK FT
You can access the FollowMyHealth Patient Portal offered by [facility] by registering at the following website: [email or web address]. By joining [employer]’s FollowMyHealth portal, you will also be able to view your health information using other applications (apps) compatible with our system.

## 2022-11-15 NOTE — PROGRESS NOTE ADULT - PROVIDER SPECIALTY LIST ADULT
Anesthesia
Critical Care
Endocrinology
Pain Medicine
Thoracic Surgery
Critical Care
Critical Care
Pain Medicine
Thoracic Surgery
Endocrinology
none

## 2022-11-16 LAB — SURGICAL PATHOLOGY STUDY: SIGNIFICANT CHANGE UP

## 2022-11-18 ENCOUNTER — OUTPATIENT (OUTPATIENT)
Dept: OUTPATIENT SERVICES | Facility: HOSPITAL | Age: 57
LOS: 1 days | Discharge: HOME | End: 2022-11-18
Payer: MEDICARE

## 2022-11-18 ENCOUNTER — APPOINTMENT (OUTPATIENT)
Dept: WOUND CARE | Facility: HOSPITAL | Age: 57
End: 2022-11-18

## 2022-11-18 VITALS
HEIGHT: 72 IN | SYSTOLIC BLOOD PRESSURE: 198 MMHG | HEART RATE: 80 BPM | RESPIRATION RATE: 20 BRPM | WEIGHT: 315 LBS | OXYGEN SATURATION: 94 % | DIASTOLIC BLOOD PRESSURE: 80 MMHG | TEMPERATURE: 98.1 F | BODY MASS INDEX: 42.66 KG/M2

## 2022-11-18 DIAGNOSIS — Z94.0 KIDNEY TRANSPLANT STATUS: ICD-10-CM

## 2022-11-18 DIAGNOSIS — E11.610 TYPE 2 DIABETES MELLITUS WITH DIABETIC NEUROPATHIC ARTHROPATHY: ICD-10-CM

## 2022-11-18 DIAGNOSIS — Z94.0 KIDNEY TRANSPLANT STATUS: Chronic | ICD-10-CM

## 2022-11-18 DIAGNOSIS — Z86.16 PERSONAL HISTORY OF COVID-19: ICD-10-CM

## 2022-11-18 DIAGNOSIS — E78.5 HYPERLIPIDEMIA, UNSPECIFIED: ICD-10-CM

## 2022-11-18 DIAGNOSIS — E11.51 TYPE 2 DIABETES MELLITUS WITH DIABETIC PERIPHERAL ANGIOPATHY WITHOUT GANGRENE: ICD-10-CM

## 2022-11-18 DIAGNOSIS — L97.412 NON-PRESSURE CHRONIC ULCER OF RIGHT HEEL AND MIDFOOT WITH FAT LAYER EXPOSED: ICD-10-CM

## 2022-11-18 DIAGNOSIS — Z86.718 PERSONAL HISTORY OF OTHER VENOUS THROMBOSIS AND EMBOLISM: ICD-10-CM

## 2022-11-18 DIAGNOSIS — Z80.8 FAMILY HISTORY OF MALIGNANT NEOPLASM OF OTHER ORGANS OR SYSTEMS: ICD-10-CM

## 2022-11-18 DIAGNOSIS — Z83.3 FAMILY HISTORY OF DIABETES MELLITUS: ICD-10-CM

## 2022-11-18 DIAGNOSIS — Z87.81 PERSONAL HISTORY OF (HEALED) TRAUMATIC FRACTURE: ICD-10-CM

## 2022-11-18 DIAGNOSIS — L84 CORNS AND CALLOSITIES: ICD-10-CM

## 2022-11-18 DIAGNOSIS — N18.6 END STAGE RENAL DISEASE: Chronic | ICD-10-CM

## 2022-11-18 DIAGNOSIS — Z80.3 FAMILY HISTORY OF MALIGNANT NEOPLASM OF BREAST: ICD-10-CM

## 2022-11-18 DIAGNOSIS — E11.621 TYPE 2 DIABETES MELLITUS WITH FOOT ULCER: ICD-10-CM

## 2022-11-18 DIAGNOSIS — Z85.828 PERSONAL HISTORY OF OTHER MALIGNANT NEOPLASM OF SKIN: ICD-10-CM

## 2022-11-18 DIAGNOSIS — Z89.421 ACQUIRED ABSENCE OF OTHER RIGHT TOE(S): Chronic | ICD-10-CM

## 2022-11-18 DIAGNOSIS — Z98.890 OTHER SPECIFIED POSTPROCEDURAL STATES: Chronic | ICD-10-CM

## 2022-11-18 DIAGNOSIS — Z89.421 ACQUIRED ABSENCE OF OTHER RIGHT TOE(S): ICD-10-CM

## 2022-11-18 DIAGNOSIS — G47.33 OBSTRUCTIVE SLEEP APNEA (ADULT) (PEDIATRIC): ICD-10-CM

## 2022-11-18 DIAGNOSIS — Z79.899 OTHER LONG TERM (CURRENT) DRUG THERAPY: ICD-10-CM

## 2022-11-18 DIAGNOSIS — L97.512 NON-PRESSURE CHRONIC ULCER OF OTHER PART OF RIGHT FOOT WITH FAT LAYER EXPOSED: ICD-10-CM

## 2022-11-18 DIAGNOSIS — Z89.422 ACQUIRED ABSENCE OF OTHER LEFT TOE(S): ICD-10-CM

## 2022-11-18 DIAGNOSIS — Z86.73 PERSONAL HISTORY OF TRANSIENT ISCHEMIC ATTACK (TIA), AND CEREBRAL INFARCTION WITHOUT RESIDUAL DEFICITS: ICD-10-CM

## 2022-11-18 PROCEDURE — 99213 OFFICE O/P EST LOW 20 MIN: CPT

## 2022-11-18 PROCEDURE — G0463: CPT

## 2022-11-21 ENCOUNTER — OUTPATIENT (OUTPATIENT)
Dept: OUTPATIENT SERVICES | Facility: HOSPITAL | Age: 57
LOS: 1 days | End: 2022-11-21
Payer: MEDICARE

## 2022-11-21 ENCOUNTER — APPOINTMENT (OUTPATIENT)
Dept: RADIOLOGY | Facility: CLINIC | Age: 57
End: 2022-11-21

## 2022-11-21 DIAGNOSIS — J90 PLEURAL EFFUSION, NOT ELSEWHERE CLASSIFIED: ICD-10-CM

## 2022-11-21 PROCEDURE — 71046 X-RAY EXAM CHEST 2 VIEWS: CPT | Mod: 26

## 2022-11-21 PROCEDURE — 71046 X-RAY EXAM CHEST 2 VIEWS: CPT

## 2022-11-23 ENCOUNTER — APPOINTMENT (OUTPATIENT)
Dept: THORACIC SURGERY | Facility: CLINIC | Age: 57
End: 2022-11-23

## 2022-11-23 VITALS
HEIGHT: 72 IN | BODY MASS INDEX: 42.26 KG/M2 | DIASTOLIC BLOOD PRESSURE: 80 MMHG | HEART RATE: 85 BPM | SYSTOLIC BLOOD PRESSURE: 131 MMHG | WEIGHT: 312 LBS | OXYGEN SATURATION: 95 %

## 2022-11-23 DIAGNOSIS — L08.9 LOCAL INFECTION OF THE SKIN AND SUBCUTANEOUS TISSUE, UNSPECIFIED: ICD-10-CM

## 2022-11-23 PROCEDURE — 99024 POSTOP FOLLOW-UP VISIT: CPT

## 2022-11-25 ENCOUNTER — APPOINTMENT (OUTPATIENT)
Dept: WOUND CARE | Facility: HOSPITAL | Age: 57
End: 2022-11-25
Payer: MEDICARE

## 2022-11-25 ENCOUNTER — OUTPATIENT (OUTPATIENT)
Dept: OUTPATIENT SERVICES | Facility: HOSPITAL | Age: 57
LOS: 1 days | Discharge: HOME | End: 2022-11-25
Payer: MEDICARE

## 2022-11-25 VITALS
RESPIRATION RATE: 20 BRPM | BODY MASS INDEX: 42.26 KG/M2 | HEART RATE: 78 BPM | OXYGEN SATURATION: 96 % | DIASTOLIC BLOOD PRESSURE: 76 MMHG | HEIGHT: 72 IN | SYSTOLIC BLOOD PRESSURE: 155 MMHG | WEIGHT: 312 LBS | TEMPERATURE: 98.1 F

## 2022-11-25 DIAGNOSIS — N18.6 END STAGE RENAL DISEASE: Chronic | ICD-10-CM

## 2022-11-25 DIAGNOSIS — Z89.421 ACQUIRED ABSENCE OF OTHER RIGHT TOE(S): Chronic | ICD-10-CM

## 2022-11-25 DIAGNOSIS — E11.621 TYPE 2 DIABETES MELLITUS WITH FOOT ULCER: ICD-10-CM

## 2022-11-25 DIAGNOSIS — Z98.890 OTHER SPECIFIED POSTPROCEDURAL STATES: Chronic | ICD-10-CM

## 2022-11-25 DIAGNOSIS — Z94.0 KIDNEY TRANSPLANT STATUS: Chronic | ICD-10-CM

## 2022-11-25 PROCEDURE — 99213 OFFICE O/P EST LOW 20 MIN: CPT

## 2022-11-25 PROCEDURE — 87077 CULTURE AEROBIC IDENTIFY: CPT

## 2022-11-25 PROCEDURE — G0463: CPT

## 2022-11-25 PROCEDURE — 87070 CULTURE OTHR SPECIMN AEROBIC: CPT

## 2022-11-25 NOTE — ASSESSMENT
[FreeTextEntry1] : 55 yo male with right pleural effusion had thoracentesis 6/2022 and 8/2022.  He underwent a FB, right VATS, drainage of pleural fluid, pleural bx, partial decortication on 11/10/22. path of pleura showing Organizing acute and chronic fibrinous pleuritis.\par \par Pt is recovering well, No shortness of breath.  Wounds show mild erythema and some fat necrosis, will start keflex. I have asked the patient to follow up with me in my office in one week.  I have answered all of his questions and he understands the importance of follow up.

## 2022-11-25 NOTE — DISCUSSION/SUMMARY
[Doing Well] : is doing well [Excellent Pain Control] : has excellent pain control [No Sign of Infection] : is showing no signs of infection [1] : 1 [Remove Sutures/Staples] : removed sutures/staples

## 2022-11-25 NOTE — PHYSICAL EXAM
[] : no respiratory distress [Auscultation Breath Sounds / Voice Sounds] : lungs were clear to auscultation bilaterally [Heart Rate And Rhythm] : heart rate was normal and rhythm regular [Murmurs] : no murmurs [Site: ___] : Site: [unfilled] [Clean] : clean [Dry] : dry [Healing Well] : healing well [FreeTextEntry1] : mild erythema, fat necrosis [No Edema] : no edema

## 2022-11-25 NOTE — REASON FOR VISIT
[de-identified] : Bronchoscopy, right video-assisted thoracoscopic surgery, drainage of pleural effusion, pleural biopsy, and total pulmonary decortication [de-identified] : 11/10/22 [Spouse] : spouse

## 2022-11-26 PROBLEM — I82.409 DEEP VENOUS THROMBOSIS OF LEG: Status: ACTIVE | Noted: 2017-05-22

## 2022-11-26 PROBLEM — R31.0 GROSS HEMATURIA: Status: ACTIVE | Noted: 2017-06-06

## 2022-11-26 PROBLEM — E11.69 CONTROLLED TYPE 2 DIABETES MELLITUS WITH OTHER SPECIFIED COMPLICATION, UNSPECIFIED WHETHER LONG TERM INSULIN USE: Status: ACTIVE | Noted: 2019-12-23

## 2022-11-26 PROBLEM — U07.1 COVID-19 VIRUS INFECTION: Status: ACTIVE | Noted: 2021-09-30

## 2022-11-26 PROBLEM — L73.9 FOLLICULITIS: Status: ACTIVE | Noted: 2017-01-23

## 2022-11-26 PROBLEM — E11.40 CHRONIC PAINFUL DIABETIC NEUROPATHY: Status: ACTIVE | Noted: 2019-12-31

## 2022-11-26 PROBLEM — M86.672 CHRONIC OSTEOMYELITIS OF LEFT FOOT: Status: ACTIVE | Noted: 2019-12-23

## 2022-11-27 LAB
CULTURE RESULTS: SIGNIFICANT CHANGE UP
SPECIMEN SOURCE: SIGNIFICANT CHANGE UP

## 2022-11-30 ENCOUNTER — APPOINTMENT (OUTPATIENT)
Dept: THORACIC SURGERY | Facility: CLINIC | Age: 57
End: 2022-11-30

## 2022-11-30 VITALS
BODY MASS INDEX: 41.75 KG/M2 | WEIGHT: 315 LBS | HEIGHT: 73 IN | SYSTOLIC BLOOD PRESSURE: 162 MMHG | HEART RATE: 97 BPM | DIASTOLIC BLOOD PRESSURE: 82 MMHG

## 2022-11-30 DIAGNOSIS — R31.0 GROSS HEMATURIA: ICD-10-CM

## 2022-11-30 DIAGNOSIS — R13.10 DYSPHAGIA, UNSPECIFIED: ICD-10-CM

## 2022-11-30 DIAGNOSIS — M86.672 OTHER CHRONIC OSTEOMYELITIS, LEFT ANKLE AND FOOT: ICD-10-CM

## 2022-11-30 DIAGNOSIS — U07.1 COVID-19: ICD-10-CM

## 2022-11-30 DIAGNOSIS — D75.1 SECONDARY POLYCYTHEMIA: ICD-10-CM

## 2022-11-30 DIAGNOSIS — N18.30 CHRONIC KIDNEY DISEASE, STAGE 3 UNSPECIFIED: ICD-10-CM

## 2022-11-30 DIAGNOSIS — E11.69 TYPE 2 DIABETES MELLITUS WITH OTHER SPECIFIED COMPLICATION: ICD-10-CM

## 2022-11-30 DIAGNOSIS — I82.409 ACUTE EMBOLISM AND THROMBOSIS OF UNSPECIFIED DEEP VEINS OF UNSPECIFIED LOWER EXTREMITY: ICD-10-CM

## 2022-11-30 DIAGNOSIS — L73.9 FOLLICULAR DISORDER, UNSPECIFIED: ICD-10-CM

## 2022-11-30 DIAGNOSIS — E11.40 TYPE 2 DIABETES MELLITUS WITH DIABETIC NEUROPATHY, UNSPECIFIED: ICD-10-CM

## 2022-11-30 PROCEDURE — 99024 POSTOP FOLLOW-UP VISIT: CPT

## 2022-11-30 NOTE — HISTORY OF PRESENT ILLNESS
[FreeTextEntry1] : 57 yo male with right pleural effusion had thoracentesis 6/2022 and 8/2022.  He underwent a FB, right VATS, drainage of pleural fluid, pleural bx, partial decortication on 11/10/22. path of pleura showing Organizing acute and chronic fibrinous pleuritis.\par \par 11/23/22: Wounds show mild erythema and some fat necrosis, started Keflex.\par \par Patient is here today for a follow up. Pt feels well without complaints\par

## 2022-11-30 NOTE — ASSESSMENT
[FreeTextEntry1] : 57 yo male s/p  right VATS, drainage of pleural fluid, pleural bx, decortication on 11/10/22. Path of pleura showing Organizing acute and chronic fibrinous pleuritis. Pt recovering well, no signs of infection. Area of eschar at chest tube site debrided. Asked patient to apply wet to dry dressings BID and follow up with me in my office in 1 week.  I have answered all of his questions and he understands the importance of follow up. \par

## 2022-11-30 NOTE — PHYSICAL EXAM
[Sclera] : the sclera and conjunctiva were normal [Neck Appearance] : the appearance of the neck was normal [Auscultation Breath Sounds / Voice Sounds] : lungs were clear to auscultation bilaterally [Heart Rate And Rhythm] : heart rate was normal and rhythm regular [Murmurs] : no murmurs [FreeTextEntry1] : Wounds without signs of infection. Chest tube site with area of eschar (sharply debrided) [Edema] : there was no peripheral edema [Bowel Sounds] : normal bowel sounds [No CVA Tenderness] : no ~M costovertebral angle tenderness [No Spinal Tenderness] : no spinal tenderness [Nail Clubbing] : no clubbing  or cyanosis of the fingernails [Skin Color & Pigmentation] : normal skin color and pigmentation [Oriented To Time, Place, And Person] : oriented to person, place, and time [Impaired Insight] : insight and judgment were intact [Affect] : the affect was normal

## 2022-12-01 ENCOUNTER — OUTPATIENT (OUTPATIENT)
Dept: OUTPATIENT SERVICES | Facility: HOSPITAL | Age: 57
LOS: 1 days | Discharge: HOME | End: 2022-12-01
Payer: MEDICARE

## 2022-12-01 ENCOUNTER — APPOINTMENT (OUTPATIENT)
Dept: WOUND CARE | Facility: HOSPITAL | Age: 57
End: 2022-12-01

## 2022-12-01 VITALS
BODY MASS INDEX: 41.75 KG/M2 | TEMPERATURE: 97.9 F | HEIGHT: 73 IN | OXYGEN SATURATION: 95 % | WEIGHT: 315 LBS | DIASTOLIC BLOOD PRESSURE: 78 MMHG | RESPIRATION RATE: 20 BRPM | HEART RATE: 71 BPM | SYSTOLIC BLOOD PRESSURE: 163 MMHG

## 2022-12-01 DIAGNOSIS — Z98.890 OTHER SPECIFIED POSTPROCEDURAL STATES: Chronic | ICD-10-CM

## 2022-12-01 DIAGNOSIS — Z85.828 PERSONAL HISTORY OF OTHER MALIGNANT NEOPLASM OF SKIN: ICD-10-CM

## 2022-12-01 DIAGNOSIS — Z80.3 FAMILY HISTORY OF MALIGNANT NEOPLASM OF BREAST: ICD-10-CM

## 2022-12-01 DIAGNOSIS — Z89.421 ACQUIRED ABSENCE OF OTHER RIGHT TOE(S): ICD-10-CM

## 2022-12-01 DIAGNOSIS — Z80.8 FAMILY HISTORY OF MALIGNANT NEOPLASM OF OTHER ORGANS OR SYSTEMS: ICD-10-CM

## 2022-12-01 DIAGNOSIS — L97.412 NON-PRESSURE CHRONIC ULCER OF RIGHT HEEL AND MIDFOOT WITH FAT LAYER EXPOSED: ICD-10-CM

## 2022-12-01 DIAGNOSIS — Z89.421 ACQUIRED ABSENCE OF OTHER RIGHT TOE(S): Chronic | ICD-10-CM

## 2022-12-01 DIAGNOSIS — Z86.73 PERSONAL HISTORY OF TRANSIENT ISCHEMIC ATTACK (TIA), AND CEREBRAL INFARCTION WITHOUT RESIDUAL DEFICITS: ICD-10-CM

## 2022-12-01 DIAGNOSIS — Z86.718 PERSONAL HISTORY OF OTHER VENOUS THROMBOSIS AND EMBOLISM: ICD-10-CM

## 2022-12-01 DIAGNOSIS — G47.33 OBSTRUCTIVE SLEEP APNEA (ADULT) (PEDIATRIC): ICD-10-CM

## 2022-12-01 DIAGNOSIS — E11.51 TYPE 2 DIABETES MELLITUS WITH DIABETIC PERIPHERAL ANGIOPATHY WITHOUT GANGRENE: ICD-10-CM

## 2022-12-01 DIAGNOSIS — Z86.16 PERSONAL HISTORY OF COVID-19: ICD-10-CM

## 2022-12-01 DIAGNOSIS — E11.621 TYPE 2 DIABETES MELLITUS WITH FOOT ULCER: ICD-10-CM

## 2022-12-01 DIAGNOSIS — L97.512 NON-PRESSURE CHRONIC ULCER OF OTHER PART OF RIGHT FOOT WITH FAT LAYER EXPOSED: ICD-10-CM

## 2022-12-01 DIAGNOSIS — E11.610 TYPE 2 DIABETES MELLITUS WITH DIABETIC NEUROPATHIC ARTHROPATHY: ICD-10-CM

## 2022-12-01 DIAGNOSIS — L84 CORNS AND CALLOSITIES: ICD-10-CM

## 2022-12-01 DIAGNOSIS — Z89.422 ACQUIRED ABSENCE OF OTHER LEFT TOE(S): ICD-10-CM

## 2022-12-01 DIAGNOSIS — E78.5 HYPERLIPIDEMIA, UNSPECIFIED: ICD-10-CM

## 2022-12-01 DIAGNOSIS — Z79.4 LONG TERM (CURRENT) USE OF INSULIN: ICD-10-CM

## 2022-12-01 DIAGNOSIS — Z79.899 OTHER LONG TERM (CURRENT) DRUG THERAPY: ICD-10-CM

## 2022-12-01 DIAGNOSIS — Z94.0 KIDNEY TRANSPLANT STATUS: ICD-10-CM

## 2022-12-01 DIAGNOSIS — Z94.0 KIDNEY TRANSPLANT STATUS: Chronic | ICD-10-CM

## 2022-12-01 DIAGNOSIS — N18.6 END STAGE RENAL DISEASE: Chronic | ICD-10-CM

## 2022-12-01 DIAGNOSIS — Z87.81 PERSONAL HISTORY OF (HEALED) TRAUMATIC FRACTURE: ICD-10-CM

## 2022-12-01 PROCEDURE — 99213 OFFICE O/P EST LOW 20 MIN: CPT

## 2022-12-01 PROCEDURE — G0463: CPT

## 2022-12-01 NOTE — REVIEW OF SYSTEMS
[Fever] : no fever [Chills] : no chills [Eye Pain] : no eye pain [Loss Of Hearing] : no hearing loss [Shortness Of Breath] : no shortness of breath [Abdominal Pain] : no abdominal pain [Vomiting] : no vomiting [Skin Lesions] : no skin lesions [Skin Wound] : no skin wound [Anxiety] : no anxiety [Easy Bleeding] : no tendency for easy bleeding [FreeTextEntry5] : HTN,HLD [FreeTextEntry7] : 40.9 BMI , morbid obesity  [FreeTextEntry8] : Kidney Transplant  [FreeTextEntry9] : Associated Diabetic Charcot foot  [de-identified] : plantar right foot midfoot , lateral  DFU 2  , large area of peripheral callus build up , the ulcer is clean and granular s/p surgical intervention  [de-identified] : IDDM with neuropathy  [de-identified] : MARILUZ

## 2022-12-01 NOTE — PHYSICAL EXAM
[4 x 4] : 4 x 4  [Abdominal Pad] : Abdominal Pad [0] : left 0 [1+] : left 1+ [Skin Ulcer] : ulcer [Alert] : alert [Oriented to Person] : oriented to person [Oriented to Place] : oriented to place [Oriented to Time] : oriented to time [Calm] : calm [Varicose Veins Of Lower Extremities] : not present [] : not present [Purpura] : no purpura  [Petechiae] : no petechiae [Skin Induration] : no induration [de-identified] : adult WM, NAD, alert, Ox 3. [de-identified] : HTN, HLD [de-identified] : Diabetic Charcot right foot deformity [de-identified] : right foot plantar ulcer 5th metatarsal ulcer down to skin, subcutaneous tissue, and fat. ulcer lateral head of 5th metatarsal down to skin, subcutaneous tissue, fat. [de-identified] : Diabetic neuropathy  [de-identified] : Culture swab obtained and sent to lab [FreeTextEntry1] : Foot Lateral 5th Met [FreeTextEntry2] : 1.2 [FreeTextEntry3] : 1.3 [FreeTextEntry4] : 0.9 to bone [de-identified] : moderate serous / serosanguineous [de-identified] : callused [de-identified] : none [de-identified] : none [de-identified] : 100% [de-identified] : Pt expressed comfort post ACE application. Circ/Neuromuscular functions WNL post application. [de-identified] : Silver Alginate [de-identified] : Mechanically cleansed with sterile gauze and normal saline 0.9%\par \par  [FreeTextEntry7] :  Plantar Foot  [FreeTextEntry8] : 1.6 [FreeTextEntry9] : 1.7 [de-identified] : 0.3 [de-identified] : small serosanguineous [de-identified] : /diabetic [de-identified] : callus [de-identified] : none [de-identified] : 100% [de-identified] : none [de-identified] : Pt expressed comfort post ACE application. Circ/Neuromuscular functions WNL post application. [de-identified] : Silver Alginate [de-identified] : Mechanically cleansed with sterile gauze and normal saline 0.9%\par \par  [TWNoteComboBox1] : Right [TWNoteComboBox5] : No [TWNoteComboBox6] : Other [de-identified] : No [de-identified] : other [de-identified] : False [de-identified] : Ace wraps [de-identified] : Daily [de-identified] : Primary Dressing [TWNoteComboBox9] : Right [de-identified] : No [de-identified] : Pressure [de-identified] : No [de-identified] : other [de-identified] : None [de-identified] : Ace wraps [de-identified] : Daily [de-identified] : Primary Dressing

## 2022-12-01 NOTE — HISTORY OF PRESENT ILLNESS
[FreeTextEntry1] : Patient seen for follow-up of right plantar fifth metatarsal base and lateral head of the fifth metatarsal diabetic foot ulceration is down to skin, subcutaneous tissue, and fat.  Patient relates that the dressings have been changed as advised since his last encounter and denies any other complaints at this time.

## 2022-12-01 NOTE — REVIEW OF SYSTEMS
[Fever] : no fever [Chills] : no chills [Eye Pain] : no eye pain [Loss Of Hearing] : no hearing loss [Shortness Of Breath] : no shortness of breath [Abdominal Pain] : no abdominal pain [Vomiting] : no vomiting [Skin Lesions] : no skin lesions [Skin Wound] : no skin wound [Anxiety] : no anxiety [Easy Bleeding] : no tendency for easy bleeding [FreeTextEntry5] : HTN,HLD [FreeTextEntry7] : 40.9 BMI , morbid obesity  [FreeTextEntry8] : Kidney Transplant  [FreeTextEntry9] : Associated Diabetic Charcot foot  [de-identified] : plantar right foot midfoot , lateral  DFU 2  , large area of peripheral callus build up , the ulcer is clean and granular s/p surgical intervention  [de-identified] : IDDM with neuropathy  [de-identified] : MARILUZ

## 2022-12-01 NOTE — PLAN
[FreeTextEntry1] : Patient examined and evaluated at this time.  Discussed etiology of symptoms and treatment options including conservative versus surgical interventions.  Patient advised regarding possibility of need to remove the base of the fifth proximal phalanx to alleviate the biomechanical pressure.  Patient and wife understands these potential risks and complications associated with surgical intervention.  Continue local wound care and offloading at this time.  Spent 20 minutes on patient care and medical decision making.  Patient to follow-up in 1 week.

## 2022-12-01 NOTE — VITALS
[] : No [de-identified] : 0/10 [FreeTextEntry5] :  Cephalexin 500 MG Oral Tablet (Cephalexin Monohydrate); TAKE 1 TABLET Every 6\par hours

## 2022-12-01 NOTE — PLAN
[FreeTextEntry1] : Patient examined and evaluated at this time.  Discussed etiology of symptoms and treatment options including conservative versus surgical interventions.  Patient advised regarding possibility of need to remove the base of the fifth proximal phalanx to alleviate the biomechanical pressure.  Also discussed possible reconstruction of the right foot but patient remains at high risk for, life-threatening sepsis, limb loss, and death.  Patient and wife understands these potential risks and complications associated with surgical intervention.  All questions answered to satisfaction and patient and wife verbalized understanding.  Continue local wound care and offloading at this time.  Spent 20 minutes on patient care and medical decision making.  Patient to follow-up in 1 week.

## 2022-12-01 NOTE — HISTORY OF PRESENT ILLNESS
[FreeTextEntry1] : Patient is a 56 year old male presenting for right plantar fifth metatarsal base and lateral head of the fifth metatarsal diabetic foot ulceration is down to skin, subcutaneous tissue, and fat. Patient relates that the dressings have been changed as advised since his last encounter and denies any other complaints at this time.

## 2022-12-01 NOTE — ASSESSMENT
[Verbal] : Verbal [Written] : Written [Demo] : Demo [Patient] : Patient [Spouse] : Spouse [Good - alert, interested, motivated] : Good - alert, interested, motivated [Verbalizes knowledge/Understanding] : Verbalizes knowledge/understanding [Dressing changes] : dressing changes [Foot Care] : foot care [Skin Care] : skin care [Signs and symptoms of infection] : sign and symptoms of infection [Nutrition] : nutrition [How and When to Call] : how and when to call [Labs and Tests] : labs and tests [Off-loading] : off-loading [Patient responsibility to plan of care] : patient responsibility to plan of care [Stable] : stable [Home] : Home [Ambulatory] : Ambulatory [Not Applicable - Long Term Care/Home Health Agency] : Long Term Care/Home Health Agency: Not Applicable [] : No [FreeTextEntry2] : Infection prevention \par Wound care (dressing changes)\par Maintain optimal skin integrity to high pressure areas\par Compression therapy\par Nutrition and wound healing\par Elevation and low sodium compliance.\par Pressure relief/ Pressure redistribution\par Offloading the stress on skin structures and decreasing potential pathologic biomechanical influences.\par Weight reduction strategies\par Culture [FreeTextEntry4] : Culture swab obtained and sent to lab\par Pt taking oral abx, Rx by Cardiothoracic Surgeon. Pt tolerating oral abx well\par Supplies submitted.\par F/u in 1 week

## 2022-12-01 NOTE — VITALS
[Pain related to present condition?] : The patient's  pain is not related to present condition. [] : No [de-identified] : 0/10

## 2022-12-01 NOTE — ASSESSMENT
[Verbal] : Verbal [Written] : Written [Demo] : Demo [Patient] : Patient [Spouse] : Spouse [Good - alert, interested, motivated] : Good - alert, interested, motivated [Verbalizes knowledge/Understanding] : Verbalizes knowledge/understanding [Dressing changes] : dressing changes [Foot Care] : foot care [Skin Care] : skin care [Signs and symptoms of infection] : sign and symptoms of infection [Nutrition] : nutrition [How and When to Call] : how and when to call [Labs and Tests] : labs and tests [Off-loading] : off-loading [Patient responsibility to plan of care] : patient responsibility to plan of care [Stable] : stable [Home] : Home [Ambulatory] : Ambulatory [Not Applicable - Long Term Care/Home Health Agency] : Long Term Care/Home Health Agency: Not Applicable [] : No [FreeTextEntry2] : Infection prevention \par Wound care (dressing changes)\par Maintain optimal skin integrity to high pressure areas\par Compression therapy\par Nutrition and wound healing\par Elevation and low sodium compliance.\par Pressure relief/ Pressure redistribution\par Offloading the stress on skin structures and decreasing potential pathologic biomechanical influences.\par Weight reduction strategies\par Surgical intervention\par F/U 1 week  [FreeTextEntry4] : Patient completed oral antibiotic regimen.\par DPM discussed culture results with the patient\par DPM discussed surgical interventions with the patient. Patient will schedule surgical intervention at the beginning of the New Year\par FU 1 week

## 2022-12-01 NOTE — PHYSICAL EXAM
[4 x 4] : 4 x 4  [Abdominal Pad] : Abdominal Pad [0] : left 0 [1+] : left 1+ [Varicose Veins Of Lower Extremities] : not present [] : not present [Purpura] : no purpura  [Petechiae] : no petechiae [Skin Ulcer] : ulcer [Skin Induration] : no induration [Alert] : alert [Oriented to Person] : oriented to person [Oriented to Place] : oriented to place [Oriented to Time] : oriented to time [Calm] : calm [de-identified] : adult WM, NAD, alert, Ox 3. [de-identified] : HTN, HLD [de-identified] : Diabetic Charcot right foot deformity [de-identified] : right foot plantar ulcer 5th metatarsal ulcer down to skin, subcutaneous tissue, and fat. ulcer lateral head of 5th metatarsal down to skin, subcutaneous tissue, fat. [de-identified] : Diabetic neuropathy  [de-identified] : Culture swab obtained and sent to lab [FreeTextEntry1] : Right Foot Lateral 5th Met [FreeTextEntry2] : 0.8 [FreeTextEntry3] : 0.6 [FreeTextEntry4] : 0.9 to bone [de-identified] : moderate serous / serosanguineous [de-identified] : callused [de-identified] : none [de-identified] : none [de-identified] : 100% [de-identified] : Pt expressed comfort post ACE application. Circ/Neuromuscular functions WNL post application. [de-identified] : Alginate Ag [de-identified] : Mechanically cleansed with sterile gauze and normal saline 0.9%\par Dry Dressing\par  [FreeTextEntry7] : Right Plantar Foot  [FreeTextEntry8] : 1.2 [FreeTextEntry9] : 1.8 [de-identified] : 0.2 [de-identified] : small serosanguineous [de-identified] : /diabetic [de-identified] : callused -- DPM shaved callus [de-identified] : none [de-identified] : 100% [de-identified] : none [de-identified] : Pt expressed comfort post ACE application. Circ/Neuromuscular functions WNL post application. [de-identified] : Alginate Ag [de-identified] : Mechanically cleansed with sterile gauze and normal saline 0.9%\par Dry Dressing\par  [TWNoteComboBox5] : No [TWNoteComboBox6] : Other [de-identified] : No [de-identified] : other [de-identified] : Cultures obtained [de-identified] : Ace wraps [de-identified] : Daily [de-identified] : Primary Dressing [de-identified] : No [de-identified] : Pressure [de-identified] : No [de-identified] : other [de-identified] : None [de-identified] : Ace wraps [de-identified] : Daily [de-identified] : Primary Dressing

## 2022-12-03 NOTE — ADVANCED PRACTICE NURSE CONSULT - REASON FOR CONSULT
Delsym DM or Mucinex DM for cough  Allergy medication--zyrtec 10mg at bedtime recommended  Flonase --continue    Benzonatate 200mg three times daily for 7 days as needed  Promethazine cough syrup for cough --this can cause sedation--if not using much in the daytime--take 2 teaspoons at bedtime     WOund Consult

## 2022-12-04 DIAGNOSIS — Z86.16 PERSONAL HISTORY OF COVID-19: ICD-10-CM

## 2022-12-04 DIAGNOSIS — E11.51 TYPE 2 DIABETES MELLITUS WITH DIABETIC PERIPHERAL ANGIOPATHY WITHOUT GANGRENE: ICD-10-CM

## 2022-12-04 DIAGNOSIS — Z79.4 LONG TERM (CURRENT) USE OF INSULIN: ICD-10-CM

## 2022-12-04 DIAGNOSIS — Z85.828 PERSONAL HISTORY OF OTHER MALIGNANT NEOPLASM OF SKIN: ICD-10-CM

## 2022-12-04 DIAGNOSIS — Z94.0 KIDNEY TRANSPLANT STATUS: ICD-10-CM

## 2022-12-04 DIAGNOSIS — Z83.3 FAMILY HISTORY OF DIABETES MELLITUS: ICD-10-CM

## 2022-12-04 DIAGNOSIS — E11.610 TYPE 2 DIABETES MELLITUS WITH DIABETIC NEUROPATHIC ARTHROPATHY: ICD-10-CM

## 2022-12-04 DIAGNOSIS — Z79.899 OTHER LONG TERM (CURRENT) DRUG THERAPY: ICD-10-CM

## 2022-12-04 DIAGNOSIS — L97.412 NON-PRESSURE CHRONIC ULCER OF RIGHT HEEL AND MIDFOOT WITH FAT LAYER EXPOSED: ICD-10-CM

## 2022-12-04 DIAGNOSIS — L84 CORNS AND CALLOSITIES: ICD-10-CM

## 2022-12-04 DIAGNOSIS — Z89.421 ACQUIRED ABSENCE OF OTHER RIGHT TOE(S): ICD-10-CM

## 2022-12-04 DIAGNOSIS — Z89.422 ACQUIRED ABSENCE OF OTHER LEFT TOE(S): ICD-10-CM

## 2022-12-04 DIAGNOSIS — G47.33 OBSTRUCTIVE SLEEP APNEA (ADULT) (PEDIATRIC): ICD-10-CM

## 2022-12-04 DIAGNOSIS — L97.512 NON-PRESSURE CHRONIC ULCER OF OTHER PART OF RIGHT FOOT WITH FAT LAYER EXPOSED: ICD-10-CM

## 2022-12-04 DIAGNOSIS — Z86.73 PERSONAL HISTORY OF TRANSIENT ISCHEMIC ATTACK (TIA), AND CEREBRAL INFARCTION WITHOUT RESIDUAL DEFICITS: ICD-10-CM

## 2022-12-04 DIAGNOSIS — E78.5 HYPERLIPIDEMIA, UNSPECIFIED: ICD-10-CM

## 2022-12-04 DIAGNOSIS — E11.621 TYPE 2 DIABETES MELLITUS WITH FOOT ULCER: ICD-10-CM

## 2022-12-04 DIAGNOSIS — Z86.718 PERSONAL HISTORY OF OTHER VENOUS THROMBOSIS AND EMBOLISM: ICD-10-CM

## 2022-12-04 DIAGNOSIS — Z80.8 FAMILY HISTORY OF MALIGNANT NEOPLASM OF OTHER ORGANS OR SYSTEMS: ICD-10-CM

## 2022-12-04 DIAGNOSIS — Z87.81 PERSONAL HISTORY OF (HEALED) TRAUMATIC FRACTURE: ICD-10-CM

## 2022-12-04 DIAGNOSIS — Z80.3 FAMILY HISTORY OF MALIGNANT NEOPLASM OF BREAST: ICD-10-CM

## 2022-12-05 NOTE — PHYSICAL EXAM
[Alert] : alert [Oriented to Person] : oriented to person [Oriented to Place] : oriented to place [Oriented to Time] : oriented to time [JVD] : no jugular venous distention  [0] : left 0 [1+] : left 1+ [Varicose Veins Of Lower Extremities] : not present [] : not present [Abdomen Masses] : No abdominal massess [Abdomen Tenderness] : ~T ~M No abdominal tenderness [Tender] : nontender [Enlarged] : not enlarged [Purpura] : no purpura  [Petechiae] : no petechiae [Skin Ulcer] : ulcer [Skin Induration] : no induration [Calm] : calm [de-identified] : adult WM, NAD, alert, Ox 3. [de-identified] : HTN, HLD [de-identified] : Diabetic Charcot right foot deformity [de-identified] : right foot plantar ulcer 5th metatarsal ulcer down to skin, subcutaneous tissue, and fat. ulcer lateral head of 5th metatarsal down to skin, subcutaneous tissue, fat. [de-identified] : Diabetic neuropathy  [FreeTextEntry1] : Right Foot Lateral 5th Met- callus [FreeTextEntry2] : 0.8 [FreeTextEntry3] : 0.7 [FreeTextEntry4] : 0.2 c [de-identified] : Small Serosanguineous [de-identified] : Intact/  callus- callus shaved by Dr Campbell [de-identified] : Ace Bandages for bandage support applied at pt request & Dr Florin marquez [de-identified] : Silver Alginate/ Dry Dressing & Kerlix [de-identified] : Mechanically cleansed with Sterile Gauze & Normal saline\par  [FreeTextEntry7] : Right Plantar Foot [FreeTextEntry8] : 1.3 [FreeTextEntry9] : 1.6 [de-identified] : 0.1 [de-identified] : Small Serosanguineous [de-identified] : Intact/ callus- callus shaved by Dr Campbell [de-identified] : Ace Bandages for bandage support applied at pt request & Dr Florin marquez [de-identified] : Silver Alginate/ Dry Dressing & Kerlix [de-identified] : Mechanically cleansed with Sterile Gauze & Normal saline\par  [de-identified] : None [de-identified] : None [de-identified] : 100% [de-identified] : No [de-identified] : None [de-identified] : None [de-identified] : 100% [de-identified] : No

## 2022-12-05 NOTE — ASSESSMENT
[Verbal] : Verbal [Demo] : Demo [Patient] : Patient [Good - alert, interested, motivated] : Good - alert, interested, motivated [Verbalizes knowledge/Understanding] : Verbalizes knowledge/understanding [Dressing changes] : dressing changes [Foot Care] : foot care [Skin Care] : skin care [Pressure relief] : pressure relief [Signs and symptoms of infection] : sign and symptoms of infection [How and When to Call] : how and when to call [Off-loading] : off-loading [Patient responsibility to plan of care] : patient responsibility to plan of care [Stable] : stable [Home] : Home [Ambulatory] : Ambulatory [] : No [FreeTextEntry2] : Alteration in skin integrity- promote optimal skin integrity\par  [FreeTextEntry3] : wounds larger [FreeTextEntry4] : Dr Campbell\par Pt states he had revision of offloading boot before his hospitalization- Dr Campbell\par Pt states he underwent Right Lung Thoracic surgery at [facility] on 11/10/22- pt states Pathology results pending- Dr Campbell\par \par F/U to [facility] in 1 week

## 2022-12-05 NOTE — HISTORY OF PRESENT ILLNESS
[FreeTextEntry1] : Patient is a 57-year-old male presenting for wound to the right plantar fifth metatarsal base and lateral head of the fifth metatarsal.  Both wounds are down to skin subcutaneous tissue and fat.  Patient has a Crow boot for the right lower extremity that requires modification

## 2022-12-05 NOTE — REVIEW OF SYSTEMS
[Fever] : no fever [Chills] : no chills [Eye Pain] : no eye pain [Loss Of Hearing] : no hearing loss [Shortness Of Breath] : no shortness of breath [Abdominal Pain] : no abdominal pain [Vomiting] : no vomiting [Skin Lesions] : no skin lesions [Skin Wound] : no skin wound [Anxiety] : no anxiety [Easy Bleeding] : no tendency for easy bleeding [FreeTextEntry5] : HTN,HLD [FreeTextEntry7] : 40.9 BMI , morbid obesity  [FreeTextEntry8] : Kidney Transplant  [FreeTextEntry9] : Associated Diabetic Charcot foot  [de-identified] : plantar right foot midfoot , lateral  DFU 2  , large area of peripheral callus build up , the ulcer is clean and granular s/p surgical intervention  [de-identified] : IDDM with neuropathy  [de-identified] : MARILUZ

## 2022-12-05 NOTE — PLAN
[FreeTextEntry1] : Patient examined and evaluated at this time.  Discussed etiology of symptoms and treatment options including conservative versus surgical interventions.  Patient advised regarding possibility of need to remove the base of the fifth proximal phalanx to alleviate the biomechanical pressure.  Patient and wife understands these potential risks and complications associated with surgical intervention.  Continue local wound care and offloading at this time. Patient states will need to get modification for the CROW boot before he can start wearing it again.  Spent 20 minutes on patient care and medical decision making.  Patient to follow-up in 1 week.

## 2022-12-06 ENCOUNTER — NON-APPOINTMENT (OUTPATIENT)
Age: 57
End: 2022-12-06

## 2022-12-06 ENCOUNTER — APPOINTMENT (OUTPATIENT)
Dept: CARDIOLOGY | Facility: CLINIC | Age: 57
End: 2022-12-06

## 2022-12-06 VITALS
SYSTOLIC BLOOD PRESSURE: 162 MMHG | BODY MASS INDEX: 41.75 KG/M2 | HEIGHT: 73 IN | WEIGHT: 315 LBS | OXYGEN SATURATION: 94 % | HEART RATE: 73 BPM | DIASTOLIC BLOOD PRESSURE: 81 MMHG

## 2022-12-06 PROCEDURE — 93000 ELECTROCARDIOGRAM COMPLETE: CPT

## 2022-12-06 PROCEDURE — 99214 OFFICE O/P EST MOD 30 MIN: CPT

## 2022-12-07 ENCOUNTER — APPOINTMENT (OUTPATIENT)
Dept: THORACIC SURGERY | Facility: CLINIC | Age: 57
End: 2022-12-07

## 2022-12-07 VITALS
OXYGEN SATURATION: 95 % | DIASTOLIC BLOOD PRESSURE: 80 MMHG | SYSTOLIC BLOOD PRESSURE: 168 MMHG | HEART RATE: 70 BPM | HEIGHT: 73 IN | WEIGHT: 315 LBS | BODY MASS INDEX: 41.75 KG/M2

## 2022-12-07 PROCEDURE — 99024 POSTOP FOLLOW-UP VISIT: CPT

## 2022-12-07 NOTE — REASON FOR VISIT
[de-identified] : RVATS decortication [de-identified] : 11/10/22 [de-identified] : PT feels well. Denies fever, chills, cough or shortness of breath [Family Member] : family member

## 2022-12-07 NOTE — PHYSICAL EXAM
[Auscultation Breath Sounds / Voice Sounds] : lungs were clear to auscultation bilaterally [Heart Rate And Rhythm] : heart rate was normal and rhythm regular [Murmurs] : no murmurs [Site: ___] : Site: [unfilled] [Clean] : clean [Dry] : dry [FreeTextEntry1] : no signs of infection, small area of eschar. [No Edema] : no edema

## 2022-12-07 NOTE — ASSESSMENT
[FreeTextEntry1] : 57 year old s/p decortication recovering well. Port site slow to heal without signs of infection. I have asked the patient to continue BID wet to dry dressing changes. I have asked the patient to follow up with me in 1 week for reevaluation.  I have answered all of his questions and he understands the importance of follow up.

## 2022-12-08 ENCOUNTER — APPOINTMENT (OUTPATIENT)
Dept: WOUND CARE | Facility: HOSPITAL | Age: 57
End: 2022-12-08

## 2022-12-08 ENCOUNTER — OUTPATIENT (OUTPATIENT)
Dept: OUTPATIENT SERVICES | Facility: HOSPITAL | Age: 57
LOS: 1 days | Discharge: HOME | End: 2022-12-08
Payer: MEDICARE

## 2022-12-08 ENCOUNTER — RESULT REVIEW (OUTPATIENT)
Age: 57
End: 2022-12-08

## 2022-12-08 VITALS
BODY MASS INDEX: 41.75 KG/M2 | RESPIRATION RATE: 20 BRPM | DIASTOLIC BLOOD PRESSURE: 78 MMHG | SYSTOLIC BLOOD PRESSURE: 150 MMHG | TEMPERATURE: 98.2 F | OXYGEN SATURATION: 95 % | WEIGHT: 315 LBS | HEIGHT: 73 IN | HEART RATE: 65 BPM

## 2022-12-08 DIAGNOSIS — N18.6 END STAGE RENAL DISEASE: Chronic | ICD-10-CM

## 2022-12-08 DIAGNOSIS — E11.621 TYPE 2 DIABETES MELLITUS WITH FOOT ULCER: ICD-10-CM

## 2022-12-08 DIAGNOSIS — Z98.890 OTHER SPECIFIED POSTPROCEDURAL STATES: Chronic | ICD-10-CM

## 2022-12-08 DIAGNOSIS — Z94.0 KIDNEY TRANSPLANT STATUS: Chronic | ICD-10-CM

## 2022-12-08 DIAGNOSIS — Z89.421 ACQUIRED ABSENCE OF OTHER RIGHT TOE(S): Chronic | ICD-10-CM

## 2022-12-08 PROCEDURE — G0463: CPT

## 2022-12-08 PROCEDURE — 99213 OFFICE O/P EST LOW 20 MIN: CPT

## 2022-12-08 NOTE — PHYSICAL EXAM
[0] : left 0 [1+] : left 1+ [Varicose Veins Of Lower Extremities] : not present [] : not present [Purpura] : no purpura  [Petechiae] : no petechiae [Skin Ulcer] : ulcer [Skin Induration] : no induration [Alert] : alert [Oriented to Person] : oriented to person [Oriented to Place] : oriented to place [Oriented to Time] : oriented to time [Calm] : calm [de-identified] : adult WM, NAD, alert, Ox 3. [de-identified] : HTN, HLD [de-identified] : Diabetic Charcot right foot deformity [de-identified] : right foot plantar ulcer 5th metatarsal ulcer down to skin, subcutaneous tissue, and fat. ulcer lateral head of 5th metatarsal down to skin, subcutaneous tissue, fat. [de-identified] : Diabetic neuropathy  [FreeTextEntry1] : Right Foot Lateral 5th Met [FreeTextEntry2] : 0.5 [FreeTextEntry3] : 0.6 [FreeTextEntry4] : 0.2 [de-identified] : Small Serosanguineous [de-identified] : Intact/ callus [de-identified] : Ace Bandages for bandage support applied at pt request & Dr Florin marquez [de-identified] : Silver Alginate/ Dry Dressing & Kerlix [de-identified] : Mechanically cleansed with Sterile Gauze & Normal saline\par  [FreeTextEntry7] : Right Plantar Foot [FreeTextEntry8] : 1.1 [FreeTextEntry9] : 1.9 [de-identified] : 0.2 [de-identified] : Small Serosanguineous [de-identified] : Intact/ callus [de-identified] : Ace Bandages for bandage support applied at pt request & Dr Florin marquez [de-identified] : Silver Alginate/ Dry Dressing & Kerlix [de-identified] : Mechanically cleansed with Sterile Gauze & Normal saline\par  [de-identified] : None [de-identified] : None [de-identified] : 100% [de-identified] : No [de-identified] : None [de-identified] : None [de-identified] : 100% [de-identified] : No

## 2022-12-08 NOTE — REVIEW OF SYSTEMS
[Fever] : no fever [Chills] : no chills [Eye Pain] : no eye pain [Loss Of Hearing] : no hearing loss [Shortness Of Breath] : no shortness of breath [Abdominal Pain] : no abdominal pain [Vomiting] : no vomiting [Skin Lesions] : no skin lesions [Skin Wound] : no skin wound [Anxiety] : no anxiety [Easy Bleeding] : no tendency for easy bleeding [FreeTextEntry5] : HTN,HLD [FreeTextEntry7] : 40.9 BMI , morbid obesity  [FreeTextEntry8] : Kidney Transplant  [FreeTextEntry9] : Associated Diabetic Charcot foot  [de-identified] : plantar right foot midfoot , lateral  DFU 2  , large area of peripheral callus build up , the ulcer is clean and granular s/p surgical intervention  [de-identified] : IDDM with neuropathy  [de-identified] : MARILUZ

## 2022-12-08 NOTE — ASSESSMENT
[Verbal] : Verbal [Demo] : Demo [Patient] : Patient [Good - alert, interested, motivated] : Good - alert, interested, motivated [Verbalizes knowledge/Understanding] : Verbalizes knowledge/understanding [Dressing changes] : dressing changes [Foot Care] : foot care [Skin Care] : skin care [Pressure relief] : pressure relief [Signs and symptoms of infection] : sign and symptoms of infection [How and When to Call] : how and when to call [Off-loading] : off-loading [Patient responsibility to plan of care] : patient responsibility to plan of care [Stable] : stable [Home] : Home [Ambulatory] : Ambulatory [Spouse] : Spouse [Labs and Tests] : labs and tests [] : No [FreeTextEntry2] : Alteration in skin integrity- promote optimal skin integrity\par  [FreeTextEntry4] : Dr Catherine\par Right Foot & Ankle xrays ordered by Dr Catherine- pt to undergo prior to next visit to [facility]\par Charcot Foot options discussed weith pt & pt's wife by Dr Catherine\par F/U to [facility] in 1 week\par

## 2022-12-08 NOTE — PLAN
[FreeTextEntry1] : Patient examined and evaluated at this time.  Discussed etiology of symptoms and treatment options including conservative versus surgical interventions.  Patient advised regarding possibility of need to remove the base of the fifth proximal phalanx to alleviate the biomechanical pressure.  Also discussed possible reconstruction of the right foot but patient remains at high risk for, life-threatening sepsis, limb loss, and death.  Patient and wife understands these potential risks and complications associated with surgical intervention.  All questions answered to satisfaction and patient and wife verbalized understanding.  Ordered weightbearing radiographs of the right foot and ankle at this time.  Continue local wound care and offloading at this time.  Spent 20 minutes on patient care and medical decision making.  Patient to follow-up in 1 week.

## 2022-12-11 DIAGNOSIS — Z79.4 LONG TERM (CURRENT) USE OF INSULIN: ICD-10-CM

## 2022-12-11 DIAGNOSIS — E78.5 HYPERLIPIDEMIA, UNSPECIFIED: ICD-10-CM

## 2022-12-11 DIAGNOSIS — Z86.718 PERSONAL HISTORY OF OTHER VENOUS THROMBOSIS AND EMBOLISM: ICD-10-CM

## 2022-12-11 DIAGNOSIS — L97.512 NON-PRESSURE CHRONIC ULCER OF OTHER PART OF RIGHT FOOT WITH FAT LAYER EXPOSED: ICD-10-CM

## 2022-12-11 DIAGNOSIS — Z86.16 PERSONAL HISTORY OF COVID-19: ICD-10-CM

## 2022-12-11 DIAGNOSIS — Z87.81 PERSONAL HISTORY OF (HEALED) TRAUMATIC FRACTURE: ICD-10-CM

## 2022-12-11 DIAGNOSIS — Z79.899 OTHER LONG TERM (CURRENT) DRUG THERAPY: ICD-10-CM

## 2022-12-11 DIAGNOSIS — G47.33 OBSTRUCTIVE SLEEP APNEA (ADULT) (PEDIATRIC): ICD-10-CM

## 2022-12-11 DIAGNOSIS — Z80.3 FAMILY HISTORY OF MALIGNANT NEOPLASM OF BREAST: ICD-10-CM

## 2022-12-11 DIAGNOSIS — L84 CORNS AND CALLOSITIES: ICD-10-CM

## 2022-12-11 DIAGNOSIS — E11.621 TYPE 2 DIABETES MELLITUS WITH FOOT ULCER: ICD-10-CM

## 2022-12-11 DIAGNOSIS — Z94.0 KIDNEY TRANSPLANT STATUS: ICD-10-CM

## 2022-12-11 DIAGNOSIS — Z89.421 ACQUIRED ABSENCE OF OTHER RIGHT TOE(S): ICD-10-CM

## 2022-12-11 DIAGNOSIS — E11.610 TYPE 2 DIABETES MELLITUS WITH DIABETIC NEUROPATHIC ARTHROPATHY: ICD-10-CM

## 2022-12-11 DIAGNOSIS — Z89.422 ACQUIRED ABSENCE OF OTHER LEFT TOE(S): ICD-10-CM

## 2022-12-11 DIAGNOSIS — Z83.3 FAMILY HISTORY OF DIABETES MELLITUS: ICD-10-CM

## 2022-12-11 DIAGNOSIS — Z85.828 PERSONAL HISTORY OF OTHER MALIGNANT NEOPLASM OF SKIN: ICD-10-CM

## 2022-12-11 DIAGNOSIS — E11.51 TYPE 2 DIABETES MELLITUS WITH DIABETIC PERIPHERAL ANGIOPATHY WITHOUT GANGRENE: ICD-10-CM

## 2022-12-11 DIAGNOSIS — Z86.73 PERSONAL HISTORY OF TRANSIENT ISCHEMIC ATTACK (TIA), AND CEREBRAL INFARCTION WITHOUT RESIDUAL DEFICITS: ICD-10-CM

## 2022-12-11 DIAGNOSIS — L97.412 NON-PRESSURE CHRONIC ULCER OF RIGHT HEEL AND MIDFOOT WITH FAT LAYER EXPOSED: ICD-10-CM

## 2022-12-11 DIAGNOSIS — Z80.8 FAMILY HISTORY OF MALIGNANT NEOPLASM OF OTHER ORGANS OR SYSTEMS: ICD-10-CM

## 2022-12-12 ENCOUNTER — APPOINTMENT (OUTPATIENT)
Dept: THORACIC SURGERY | Facility: CLINIC | Age: 57
End: 2022-12-12

## 2022-12-12 VITALS
OXYGEN SATURATION: 92 % | HEART RATE: 69 BPM | WEIGHT: 315 LBS | SYSTOLIC BLOOD PRESSURE: 171 MMHG | BODY MASS INDEX: 41.75 KG/M2 | DIASTOLIC BLOOD PRESSURE: 76 MMHG | HEIGHT: 73 IN

## 2022-12-12 PROCEDURE — 99024 POSTOP FOLLOW-UP VISIT: CPT

## 2022-12-13 ENCOUNTER — OUTPATIENT (OUTPATIENT)
Dept: OUTPATIENT SERVICES | Facility: HOSPITAL | Age: 57
LOS: 1 days | End: 2022-12-13
Payer: MEDICARE

## 2022-12-13 DIAGNOSIS — Z98.890 OTHER SPECIFIED POSTPROCEDURAL STATES: Chronic | ICD-10-CM

## 2022-12-13 DIAGNOSIS — E11.621 TYPE 2 DIABETES MELLITUS WITH FOOT ULCER: ICD-10-CM

## 2022-12-13 DIAGNOSIS — N18.6 END STAGE RENAL DISEASE: Chronic | ICD-10-CM

## 2022-12-13 DIAGNOSIS — Z89.421 ACQUIRED ABSENCE OF OTHER RIGHT TOE(S): Chronic | ICD-10-CM

## 2022-12-13 DIAGNOSIS — Z94.0 KIDNEY TRANSPLANT STATUS: Chronic | ICD-10-CM

## 2022-12-13 PROCEDURE — 73610 X-RAY EXAM OF ANKLE: CPT | Mod: 26,RT

## 2022-12-13 PROCEDURE — 73630 X-RAY EXAM OF FOOT: CPT | Mod: 26,RT

## 2022-12-13 PROCEDURE — 73630 X-RAY EXAM OF FOOT: CPT

## 2022-12-13 PROCEDURE — 73610 X-RAY EXAM OF ANKLE: CPT

## 2022-12-14 ENCOUNTER — NON-APPOINTMENT (OUTPATIENT)
Age: 57
End: 2022-12-14

## 2022-12-14 ENCOUNTER — APPOINTMENT (OUTPATIENT)
Dept: THORACIC SURGERY | Facility: CLINIC | Age: 57
End: 2022-12-14

## 2022-12-15 ENCOUNTER — RESULT REVIEW (OUTPATIENT)
Age: 57
End: 2022-12-15

## 2022-12-15 ENCOUNTER — APPOINTMENT (OUTPATIENT)
Dept: WOUND CARE | Facility: HOSPITAL | Age: 57
End: 2022-12-15

## 2022-12-15 ENCOUNTER — RX RENEWAL (OUTPATIENT)
Age: 57
End: 2022-12-15

## 2022-12-15 ENCOUNTER — OUTPATIENT (OUTPATIENT)
Dept: OUTPATIENT SERVICES | Facility: HOSPITAL | Age: 57
LOS: 1 days | Discharge: HOME | End: 2022-12-15
Payer: MEDICARE

## 2022-12-15 ENCOUNTER — APPOINTMENT (OUTPATIENT)
Dept: NEPHROLOGY | Facility: CLINIC | Age: 57
End: 2022-12-15

## 2022-12-15 VITALS
HEIGHT: 73 IN | WEIGHT: 315 LBS | HEART RATE: 70 BPM | RESPIRATION RATE: 20 BRPM | TEMPERATURE: 98 F | OXYGEN SATURATION: 95 % | DIASTOLIC BLOOD PRESSURE: 77 MMHG | SYSTOLIC BLOOD PRESSURE: 162 MMHG | BODY MASS INDEX: 41.75 KG/M2

## 2022-12-15 VITALS
WEIGHT: 315 LBS | SYSTOLIC BLOOD PRESSURE: 129 MMHG | HEIGHT: 73 IN | OXYGEN SATURATION: 94 % | DIASTOLIC BLOOD PRESSURE: 77 MMHG | HEART RATE: 72 BPM | TEMPERATURE: 98.9 F | RESPIRATION RATE: 20 BRPM | BODY MASS INDEX: 41.75 KG/M2

## 2022-12-15 VITALS — SYSTOLIC BLOOD PRESSURE: 138 MMHG | DIASTOLIC BLOOD PRESSURE: 78 MMHG

## 2022-12-15 DIAGNOSIS — E11.621 TYPE 2 DIABETES MELLITUS WITH FOOT ULCER: ICD-10-CM

## 2022-12-15 PROCEDURE — G0008: CPT

## 2022-12-15 PROCEDURE — 99214 OFFICE O/P EST MOD 30 MIN: CPT | Mod: 25

## 2022-12-15 PROCEDURE — G0463: CPT

## 2022-12-15 PROCEDURE — 99213 OFFICE O/P EST LOW 20 MIN: CPT

## 2022-12-15 PROCEDURE — 90686 IIV4 VACC NO PRSV 0.5 ML IM: CPT

## 2022-12-15 NOTE — HISTORY OF PRESENT ILLNESS
[FreeTextEntry1] : Currently:\par \par Patient feels well. Had right thoracic surgery intervention for recurrent pleural effusion. Has had chest tubes removed . ( Procedure done by Dr. Donte Kaur at [facility])\par Reviewed for acute symptoms, currently has right foot lesion being managed by Podiatrist, wears special footwear on the right.\par \par \par His medications are reviewed and no recent changes.\par \par Noted last Tac level 5.2 in November. Took Tacrolimus prior to his labs today.  \par \par \par Independent for ADL.  Able to walk. No SOB at rest. Able to eat and drink normal fluids.\par \par \par I reviewed current home  blood pressure and home glucose control and medications.\par \par  \par Reviewed last lab data from hospital in [facility]\par

## 2022-12-15 NOTE — PHYSICAL EXAM
[] : no respiratory distress [Auscultation Breath Sounds / Voice Sounds] : lungs were clear to auscultation bilaterally [Heart Sounds] : normal S1 and S2 [Heart Rate And Rhythm] : heart rate was normal and rhythm regular [Heart Sounds Gallop] : no gallops [Murmurs] : no murmurs [Heart Sounds Pericardial Friction Rub] : no pericardial rub [FreeTextEntry1] : R VATS. The incision is clean and dry. no signs of infection, small area of eschar.

## 2022-12-15 NOTE — HISTORY OF PRESENT ILLNESS
[FreeTextEntry1] : 56 yo male with right pleural effusion had thoracentesis 6/2022 and 8/2022.  He underwent a FB, right VATS, drainage of pleural fluid, pleural bx, partial decortication on 11/10/22. path of pleura showing Organizing acute and chronic fibrinous pleuritis.\par \par 11/23/22: Wounds show mild erythema and some fat necrosis, started Keflex.\par \par 12/07/2022: Seen in office, recommended continue BID wet to dry dressing changes and RTC in 1 week for reevaluation.\par \par Patient is here today for a follow up. Overall, he reports to be feeling well. Denies any chest pain, shortness of breath, cough, hemoptysis, fever, or chills.\par

## 2022-12-15 NOTE — PHYSICAL EXAM
[General Appearance - Alert] : alert [General Appearance - Well Nourished] : well nourished [General Appearance - Well Developed] : well developed [Sclera] : the sclera and conjunctiva were normal [Extraocular Movements] : extraocular movements were intact [Hearing Threshold Finger Rub Not [place]] : hearing was normal [Oropharynx] : the oropharynx was normal [Neck Cervical Mass (___cm)] : no neck mass was observed [Jugular Venous Distention Increased] : there was no jugular-venous distention [Respiration, Rhythm And Depth] : normal respiratory rhythm and effort [Auscultation Breath Sounds / Voice Sounds] : lungs were clear to auscultation bilaterally [Heart Sounds] : normal S1 and S2 [Heart Sounds Gallop] : no gallops [Heart Sounds Pericardial Friction Rub] : no pericardial rub [Abdomen Soft] : soft [Abdomen Tenderness] : non-tender [Cervical Lymph Nodes Enlarged Posterior Bilaterally] : posterior cervical [Cervical Lymph Nodes Enlarged Anterior Bilaterally] : anterior cervical [Supraclavicular Lymph Nodes Enlarged Bilaterally] : supraclavicular [Axillary Lymph Nodes Enlarged Bilaterally] : axillary [Inguinal Lymph Nodes Enlarged Bilaterally] : inguinal [No CVA Tenderness] : no ~M costovertebral angle tenderness [No Spinal Tenderness] : no spinal tenderness [Motor Tone] : muscle strength and tone were normal [___ (cm) Fistula] : [unfilled] (cm) fistula [] : no rash [FreeTextEntry1] : no tremor [Oriented To Time, Place, And Person] : oriented to person, place, and time [Affect] : the affect was normal [Mood] : the mood was normal

## 2022-12-15 NOTE — ASSESSMENT
[FreeTextEntry1] : Clinical Impression:\par Kidney Transplant recipient, functioning allograft, labs done today.\par Immunosuppression- Reviewed and unchanged, most recent Tac level therapeutic. No level today since he took Tacrolimus already.\par Medications refilled.\par DM Controlled.\par HTN controlled\par Recurrent right pleural effusion- s/p Thoracic surgery procedure in Nov 2022, now recovered.\par Right foot ulcer, on wound care follow up and Podiatry follow up.\par \par Plan:\par Immunosuppression continued\par Continue current medications\par additional changes - None made today\par Labs and follow up visit to be scheduled as discussed. Has f/u with Dr. Rondon and transplant center follow up \par Discussed Renal Preservation strategies including achieving optimal weight through calory restriction and regular exercise, daily exercise, sleep hygiene, optimized control of glucose, blood pressure, lipids, avoidance of NSAIDs, Low Na and Low animal protein diet and medication adherence.\par yearly.\par \par \par Gave Flu vaccine Leftt deltoid intramuscular\par Fluzone quadrivalent\par Lot [id number]\par exp 6/30/23\par \par \par \par

## 2022-12-15 NOTE — REASON FOR VISIT
[Follow-Up] : a follow-up visit [FreeTextEntry1] : Post kidney transplant follow up- has had recurrent pleural effusion, now s/p thoracic surgery in Nov 2022

## 2022-12-16 ENCOUNTER — NON-APPOINTMENT (OUTPATIENT)
Age: 57
End: 2022-12-16

## 2022-12-16 LAB
ALBUMIN SERPL ELPH-MCNC: 4 G/DL
ALP BLD-CCNC: 110 U/L
ALT SERPL-CCNC: 8 U/L
ANION GAP SERPL CALC-SCNC: 14 MMOL/L
APPEARANCE: CLEAR
AST SERPL-CCNC: 15 U/L
BACTERIA: NEGATIVE
BASOPHILS # BLD AUTO: 0.04 K/UL
BASOPHILS NFR BLD AUTO: 0.5 %
BILIRUB SERPL-MCNC: 0.4 MG/DL
BILIRUBIN URINE: NEGATIVE
BLOOD URINE: NEGATIVE
BUN SERPL-MCNC: 28 MG/DL
CALCIUM SERPL-MCNC: 9.3 MG/DL
CHLORIDE SERPL-SCNC: 102 MMOL/L
CMV DNA SPEC QL NAA+PROBE: NOT DETECTED IU/ML
CMVPCR LOG: NOT DETECTED LOG10IU/ML
CO2 SERPL-SCNC: 26 MMOL/L
COLOR: YELLOW
CREAT SERPL-MCNC: 1.31 MG/DL
CREAT SPEC-SCNC: 105 MG/DL
CREAT/PROT UR: 0.2 RATIO
EGFR: 64 ML/MIN/1.73M2
EOSINOPHIL # BLD AUTO: 0.36 K/UL
EOSINOPHIL NFR BLD AUTO: 4.3 %
GLUCOSE QUALITATIVE U: NEGATIVE
GLUCOSE SERPL-MCNC: 156 MG/DL
HCT VFR BLD CALC: 38.5 %
HGB BLD-MCNC: 12 G/DL
HYALINE CASTS: 0 /LPF
IMM GRANULOCYTES NFR BLD AUTO: 0.4 %
KETONES URINE: NEGATIVE
LDH SERPL-CCNC: 189 U/L
LEUKOCYTE ESTERASE URINE: NEGATIVE
LYMPHOCYTES # BLD AUTO: 0.9 K/UL
LYMPHOCYTES NFR BLD AUTO: 10.7 %
MAGNESIUM SERPL-MCNC: 2 MG/DL
MAN DIFF?: NORMAL
MCHC RBC-ENTMCNC: 28 PG
MCHC RBC-ENTMCNC: 31.2 GM/DL
MCV RBC AUTO: 89.7 FL
MICROSCOPIC-UA: NORMAL
MONOCYTES # BLD AUTO: 0.64 K/UL
MONOCYTES NFR BLD AUTO: 7.6 %
NEUTROPHILS # BLD AUTO: 6.48 K/UL
NEUTROPHILS NFR BLD AUTO: 76.5 %
NITRITE URINE: NEGATIVE
PH URINE: 6
PHOSPHATE SERPL-MCNC: 3.8 MG/DL
PLATELET # BLD AUTO: 200 K/UL
POTASSIUM SERPL-SCNC: 5.4 MMOL/L
PROT SERPL-MCNC: 6.8 G/DL
PROT UR-MCNC: 20 MG/DL
PROTEIN URINE: NORMAL
RBC # BLD: 4.29 M/UL
RBC # FLD: 14.6 %
RED BLOOD CELLS URINE: 2 /HPF
SODIUM SERPL-SCNC: 142 MMOL/L
SPECIFIC GRAVITY URINE: 1.02
SQUAMOUS EPITHELIAL CELLS: 0 /HPF
TACROLIMUS SERPL-MCNC: 6.9 NG/ML
URATE SERPL-MCNC: 5.9 MG/DL
UROBILINOGEN URINE: ABNORMAL
WBC # FLD AUTO: 8.45 K/UL
WHITE BLOOD CELLS URINE: 1 /HPF

## 2022-12-19 ENCOUNTER — OUTPATIENT (OUTPATIENT)
Dept: OUTPATIENT SERVICES | Facility: HOSPITAL | Age: 57
LOS: 1 days | End: 2022-12-19
Payer: MEDICARE

## 2022-12-19 DIAGNOSIS — Z98.890 OTHER SPECIFIED POSTPROCEDURAL STATES: Chronic | ICD-10-CM

## 2022-12-19 DIAGNOSIS — N18.6 END STAGE RENAL DISEASE: Chronic | ICD-10-CM

## 2022-12-19 DIAGNOSIS — E11.621 TYPE 2 DIABETES MELLITUS WITH FOOT ULCER: ICD-10-CM

## 2022-12-19 DIAGNOSIS — Z94.0 KIDNEY TRANSPLANT STATUS: Chronic | ICD-10-CM

## 2022-12-19 DIAGNOSIS — Z89.421 ACQUIRED ABSENCE OF OTHER RIGHT TOE(S): Chronic | ICD-10-CM

## 2022-12-19 LAB — BKV DNA SPEC QL NAA+PROBE: NOT DETECTED IU/ML

## 2022-12-19 PROCEDURE — 73630 X-RAY EXAM OF FOOT: CPT | Mod: 26,RT

## 2022-12-19 PROCEDURE — 73630 X-RAY EXAM OF FOOT: CPT

## 2022-12-21 ENCOUNTER — APPOINTMENT (OUTPATIENT)
Dept: THORACIC SURGERY | Facility: CLINIC | Age: 57
End: 2022-12-21

## 2022-12-21 VITALS
SYSTOLIC BLOOD PRESSURE: 142 MMHG | DIASTOLIC BLOOD PRESSURE: 68 MMHG | HEIGHT: 73 IN | BODY MASS INDEX: 41.75 KG/M2 | WEIGHT: 315 LBS | OXYGEN SATURATION: 91 % | HEART RATE: 75 BPM | TEMPERATURE: 97 F

## 2022-12-21 DIAGNOSIS — Z79.4 LONG TERM (CURRENT) USE OF INSULIN: ICD-10-CM

## 2022-12-21 DIAGNOSIS — E78.5 HYPERLIPIDEMIA, UNSPECIFIED: ICD-10-CM

## 2022-12-21 DIAGNOSIS — Z85.828 PERSONAL HISTORY OF OTHER MALIGNANT NEOPLASM OF SKIN: ICD-10-CM

## 2022-12-21 DIAGNOSIS — G47.33 OBSTRUCTIVE SLEEP APNEA (ADULT) (PEDIATRIC): ICD-10-CM

## 2022-12-21 DIAGNOSIS — E11.621 TYPE 2 DIABETES MELLITUS WITH FOOT ULCER: ICD-10-CM

## 2022-12-21 DIAGNOSIS — Z83.3 FAMILY HISTORY OF DIABETES MELLITUS: ICD-10-CM

## 2022-12-21 DIAGNOSIS — Z86.73 PERSONAL HISTORY OF TRANSIENT ISCHEMIC ATTACK (TIA), AND CEREBRAL INFARCTION WITHOUT RESIDUAL DEFICITS: ICD-10-CM

## 2022-12-21 DIAGNOSIS — L84 CORNS AND CALLOSITIES: ICD-10-CM

## 2022-12-21 DIAGNOSIS — Z80.3 FAMILY HISTORY OF MALIGNANT NEOPLASM OF BREAST: ICD-10-CM

## 2022-12-21 DIAGNOSIS — Z89.422 ACQUIRED ABSENCE OF OTHER LEFT TOE(S): ICD-10-CM

## 2022-12-21 DIAGNOSIS — Z87.81 PERSONAL HISTORY OF (HEALED) TRAUMATIC FRACTURE: ICD-10-CM

## 2022-12-21 DIAGNOSIS — E11.610 TYPE 2 DIABETES MELLITUS WITH DIABETIC NEUROPATHIC ARTHROPATHY: ICD-10-CM

## 2022-12-21 DIAGNOSIS — E11.51 TYPE 2 DIABETES MELLITUS WITH DIABETIC PERIPHERAL ANGIOPATHY WITHOUT GANGRENE: ICD-10-CM

## 2022-12-21 DIAGNOSIS — Z79.899 OTHER LONG TERM (CURRENT) DRUG THERAPY: ICD-10-CM

## 2022-12-21 DIAGNOSIS — L97.412 NON-PRESSURE CHRONIC ULCER OF RIGHT HEEL AND MIDFOOT WITH FAT LAYER EXPOSED: ICD-10-CM

## 2022-12-21 DIAGNOSIS — L97.512 NON-PRESSURE CHRONIC ULCER OF OTHER PART OF RIGHT FOOT WITH FAT LAYER EXPOSED: ICD-10-CM

## 2022-12-21 DIAGNOSIS — Z80.8 FAMILY HISTORY OF MALIGNANT NEOPLASM OF OTHER ORGANS OR SYSTEMS: ICD-10-CM

## 2022-12-21 DIAGNOSIS — Z89.421 ACQUIRED ABSENCE OF OTHER RIGHT TOE(S): ICD-10-CM

## 2022-12-21 DIAGNOSIS — Z86.16 PERSONAL HISTORY OF COVID-19: ICD-10-CM

## 2022-12-21 DIAGNOSIS — Z94.0 KIDNEY TRANSPLANT STATUS: ICD-10-CM

## 2022-12-21 DIAGNOSIS — Z86.718 PERSONAL HISTORY OF OTHER VENOUS THROMBOSIS AND EMBOLISM: ICD-10-CM

## 2022-12-21 PROCEDURE — 99024 POSTOP FOLLOW-UP VISIT: CPT

## 2022-12-21 NOTE — PHYSICAL EXAM
[0] : left 0 [1+] : left 1+ [Skin Ulcer] : ulcer [Alert] : alert [Oriented to Person] : oriented to person [Oriented to Place] : oriented to place [Oriented to Time] : oriented to time [Calm] : calm [Varicose Veins Of Lower Extremities] : not present [] : not present [Purpura] : no purpura  [Petechiae] : no petechiae [Skin Induration] : no induration [de-identified] : adult WM, NAD, alert, Ox 3. [de-identified] : HTN, HLD [de-identified] : Diabetic Charcot right foot deformity [de-identified] : right foot plantar ulcer 5th metatarsal ulcer down to skin, subcutaneous tissue, and fat. ulcer lateral head of 5th metatarsal down to skin, subcutaneous tissue, fat. [de-identified] : Diabetic neuropathy  [FreeTextEntry1] : Right Foot Lateral 5th Met [FreeTextEntry2] : 0.7 [FreeTextEntry3] : 0.8 [FreeTextEntry4] : 0.2 [de-identified] : Small Serosanguineous [de-identified] : Intact/ callus- callus shaved by Dr Catherine [de-identified] : % [de-identified] : 1-25% [de-identified] : Ace Bandages for bandage support applied at pt request & Dr Florin marquez [de-identified] : Silver Alginate/ Dry Dressing & Kerlix [de-identified] : Mechanically cleansed with Sterile Gauze & Normal saline\par  [FreeTextEntry7] : Right Plantar Foot [FreeTextEntry8] : 1.1 [FreeTextEntry9] : 1.7 [de-identified] : 0.3 [de-identified] : Small Serosanguineous [de-identified] : Intact/ callus [de-identified] : Ace Bandages for bandage support applied at pt request & Dr Florin marquez [de-identified] : Silver Alginate/ Dry Dressing & Kerlix [de-identified] : Mechanically cleansed with Sterile Gauze & Normal saline\par  [de-identified] : None [de-identified] : None [de-identified] : 100% [de-identified] : Yes [de-identified] : None [de-identified] : None [de-identified] : 100% [de-identified] : No

## 2022-12-21 NOTE — ASSESSMENT
[FreeTextEntry1] : 57 year old morbidly obese diabetic male s/p decortication. Breathing is much improved. Wounds are slowly healing without signs of infection. pt to follow up in two weeks for wound check.  I have answered all of his questions and he understands the importance of follow up.

## 2022-12-21 NOTE — PHYSICAL EXAM
[Auscultation Breath Sounds / Voice Sounds] : lungs were clear to auscultation bilaterally [Heart Rate And Rhythm] : heart rate was normal and rhythm regular [Murmurs] : no murmurs [Site: ___] : Site: [unfilled] [Clean] : clean [Dry] : dry [FreeTextEntry1] : slow healing no signs of infection [No Edema] : no edema

## 2022-12-21 NOTE — ASSESSMENT
[Verbal] : Verbal [Demo] : Demo [Patient] : Patient [Spouse] : Spouse [Good - alert, interested, motivated] : Good - alert, interested, motivated [Verbalizes knowledge/Understanding] : Verbalizes knowledge/understanding [Dressing changes] : dressing changes [Foot Care] : foot care [Skin Care] : skin care [Pressure relief] : pressure relief [Signs and symptoms of infection] : sign and symptoms of infection [How and When to Call] : how and when to call [Labs and Tests] : labs and tests [Off-loading] : off-loading [Patient responsibility to plan of care] : patient responsibility to plan of care [Stable] : stable [Home] : Home [Ambulatory] : Ambulatory [Surgery] : surgery [] : No [FreeTextEntry2] : Alteration in skin integrity- promote optimal skin integrity\par  [FreeTextEntry3] : unchanged [FreeTextEntry4] : Dr Catherine/ Photos taken\par Pt states he underwent Right Foot & Ankle xrays on Tuesday, 12/13/22 as ordered by Dr Catherine- results discussed with pt & wife by Dr Catherine- new set of xrays ordered by Dr Catherine- pt to undergo xrays prior to f/u visit to [facility]\par Charcot Foot options discussed with pt & pt's wife by Dr Catherine\par F/U to [facility] in 1 week\par

## 2022-12-21 NOTE — REVIEW OF SYSTEMS
[Fever] : no fever [Chills] : no chills [Eye Pain] : no eye pain [Loss Of Hearing] : no hearing loss [Shortness Of Breath] : no shortness of breath [Abdominal Pain] : no abdominal pain [Vomiting] : no vomiting [Skin Lesions] : no skin lesions [Skin Wound] : no skin wound [Anxiety] : no anxiety [Easy Bleeding] : no tendency for easy bleeding [FreeTextEntry5] : HTN,HLD [FreeTextEntry7] : 40.9 BMI , morbid obesity  [FreeTextEntry8] : Kidney Transplant  [FreeTextEntry9] : Associated Diabetic Charcot foot  [de-identified] : plantar right foot midfoot , lateral  DFU 2  , large area of peripheral callus build up , the ulcer is clean and granular s/p surgical intervention  [de-identified] : IDDM with neuropathy  [de-identified] : MARILUZ

## 2022-12-22 ENCOUNTER — OUTPATIENT (OUTPATIENT)
Dept: OUTPATIENT SERVICES | Facility: HOSPITAL | Age: 57
LOS: 1 days | Discharge: HOME | End: 2022-12-22
Payer: MEDICARE

## 2022-12-22 ENCOUNTER — APPOINTMENT (OUTPATIENT)
Dept: WOUND CARE | Facility: HOSPITAL | Age: 57
End: 2022-12-22

## 2022-12-22 VITALS
OXYGEN SATURATION: 94 % | TEMPERATURE: 98.6 F | HEIGHT: 73 IN | WEIGHT: 315 LBS | DIASTOLIC BLOOD PRESSURE: 85 MMHG | SYSTOLIC BLOOD PRESSURE: 184 MMHG | RESPIRATION RATE: 20 BRPM | HEART RATE: 70 BPM | BODY MASS INDEX: 41.75 KG/M2

## 2022-12-22 VITALS — SYSTOLIC BLOOD PRESSURE: 181 MMHG | DIASTOLIC BLOOD PRESSURE: 78 MMHG

## 2022-12-22 DIAGNOSIS — N18.6 END STAGE RENAL DISEASE: Chronic | ICD-10-CM

## 2022-12-22 DIAGNOSIS — E11.621 TYPE 2 DIABETES MELLITUS WITH FOOT ULCER: ICD-10-CM

## 2022-12-22 DIAGNOSIS — Z94.0 KIDNEY TRANSPLANT STATUS: Chronic | ICD-10-CM

## 2022-12-22 DIAGNOSIS — Z89.421 ACQUIRED ABSENCE OF OTHER RIGHT TOE(S): Chronic | ICD-10-CM

## 2022-12-22 DIAGNOSIS — Z98.890 OTHER SPECIFIED POSTPROCEDURAL STATES: Chronic | ICD-10-CM

## 2022-12-22 PROCEDURE — 99213 OFFICE O/P EST LOW 20 MIN: CPT

## 2022-12-22 PROCEDURE — G0463: CPT

## 2022-12-24 DIAGNOSIS — Z80.3 FAMILY HISTORY OF MALIGNANT NEOPLASM OF BREAST: ICD-10-CM

## 2022-12-24 DIAGNOSIS — Z79.899 OTHER LONG TERM (CURRENT) DRUG THERAPY: ICD-10-CM

## 2022-12-24 DIAGNOSIS — Z83.3 FAMILY HISTORY OF DIABETES MELLITUS: ICD-10-CM

## 2022-12-24 DIAGNOSIS — L97.412 NON-PRESSURE CHRONIC ULCER OF RIGHT HEEL AND MIDFOOT WITH FAT LAYER EXPOSED: ICD-10-CM

## 2022-12-24 DIAGNOSIS — E11.610 TYPE 2 DIABETES MELLITUS WITH DIABETIC NEUROPATHIC ARTHROPATHY: ICD-10-CM

## 2022-12-24 DIAGNOSIS — Z80.8 FAMILY HISTORY OF MALIGNANT NEOPLASM OF OTHER ORGANS OR SYSTEMS: ICD-10-CM

## 2022-12-24 DIAGNOSIS — Z89.422 ACQUIRED ABSENCE OF OTHER LEFT TOE(S): ICD-10-CM

## 2022-12-24 DIAGNOSIS — Z86.16 PERSONAL HISTORY OF COVID-19: ICD-10-CM

## 2022-12-24 DIAGNOSIS — Z79.4 LONG TERM (CURRENT) USE OF INSULIN: ICD-10-CM

## 2022-12-24 DIAGNOSIS — Z86.718 PERSONAL HISTORY OF OTHER VENOUS THROMBOSIS AND EMBOLISM: ICD-10-CM

## 2022-12-24 DIAGNOSIS — E11.51 TYPE 2 DIABETES MELLITUS WITH DIABETIC PERIPHERAL ANGIOPATHY WITHOUT GANGRENE: ICD-10-CM

## 2022-12-24 DIAGNOSIS — L84 CORNS AND CALLOSITIES: ICD-10-CM

## 2022-12-24 DIAGNOSIS — Z86.73 PERSONAL HISTORY OF TRANSIENT ISCHEMIC ATTACK (TIA), AND CEREBRAL INFARCTION WITHOUT RESIDUAL DEFICITS: ICD-10-CM

## 2022-12-24 DIAGNOSIS — E11.621 TYPE 2 DIABETES MELLITUS WITH FOOT ULCER: ICD-10-CM

## 2022-12-24 DIAGNOSIS — E78.5 HYPERLIPIDEMIA, UNSPECIFIED: ICD-10-CM

## 2022-12-24 DIAGNOSIS — Z94.0 KIDNEY TRANSPLANT STATUS: ICD-10-CM

## 2022-12-24 DIAGNOSIS — Z87.81 PERSONAL HISTORY OF (HEALED) TRAUMATIC FRACTURE: ICD-10-CM

## 2022-12-24 DIAGNOSIS — L97.512 NON-PRESSURE CHRONIC ULCER OF OTHER PART OF RIGHT FOOT WITH FAT LAYER EXPOSED: ICD-10-CM

## 2022-12-24 DIAGNOSIS — G47.33 OBSTRUCTIVE SLEEP APNEA (ADULT) (PEDIATRIC): ICD-10-CM

## 2022-12-24 DIAGNOSIS — Z85.828 PERSONAL HISTORY OF OTHER MALIGNANT NEOPLASM OF SKIN: ICD-10-CM

## 2022-12-24 NOTE — ASSESSMENT
[Verbal] : Verbal [Demo] : Demo [Patient] : Patient [Spouse] : Spouse [Good - alert, interested, motivated] : Good - alert, interested, motivated [Verbalizes knowledge/Understanding] : Verbalizes knowledge/understanding [Dressing changes] : dressing changes [Foot Care] : foot care [Skin Care] : skin care [Pressure relief] : pressure relief [Signs and symptoms of infection] : sign and symptoms of infection [Surgery] : surgery [How and When to Call] : how and when to call [Labs and Tests] : labs and tests [Off-loading] : off-loading [Patient responsibility to plan of care] : patient responsibility to plan of care [Stable] : stable [Home] : Home [Ambulatory] : Ambulatory [] : No [FreeTextEntry2] : Restore optimal skin integrity\par Infection Prevention\par Offloading/Pressure relief\par Surgical intervention\par Bone Biopsy\par Surgical intervention\par F/U 2 weeks\par  [FreeTextEntry3] : unchanged [FreeTextEntry4] : No s/s of infection noted\par DPM ordered a bone biopsy, authorization submitted\par Surgical intervention to be performed in January 2023 \par F/U 2 weeks \par \par \par

## 2022-12-24 NOTE — VITALS
[Pain related to present condition?] : The patient's  pain is not related to present condition. [] : No [de-identified] : 0/10

## 2022-12-24 NOTE — HISTORY OF PRESENT ILLNESS
[FreeTextEntry1] : Patient seen for follow-up of right plantar fifth metatarsal base and lateral head of the fifth metatarsal diabetic foot ulceration is down to skin, subcutaneous tissue, and fat.  Patient relates that the dressings have been changed as advised since his last encounter and denies any other complaints at this time.  Patient and patient's wife relate that they have considered treatment options ambulate that he would like to pursue surgical intervention at this time after the holidays.

## 2022-12-24 NOTE — PHYSICAL EXAM
[4 x 4] : 4 x 4  [Abdominal Pad] : Abdominal Pad [0] : left 0 [1+] : left 1+ [Skin Ulcer] : ulcer [Alert] : alert [Oriented to Person] : oriented to person [Oriented to Place] : oriented to place [Oriented to Time] : oriented to time [Calm] : calm [Varicose Veins Of Lower Extremities] : not present [] : not present [Purpura] : no purpura  [Petechiae] : no petechiae [Skin Induration] : no induration [de-identified] : adult WM, NAD, alert, Ox 3. [de-identified] : HTN, HLD [de-identified] : Diabetic Charcot right foot deformity [de-identified] : right foot plantar ulcer 5th metatarsal ulcer down to skin, subcutaneous tissue, and fat. ulcer lateral head of 5th metatarsal down to skin, subcutaneous tissue, fat. [de-identified] : Diabetic neuropathy  [FreeTextEntry1] :  Foot Lateral 5th Met [FreeTextEntry2] : 0.9 [FreeTextEntry3] : 1.1 [FreeTextEntry4] : 0.3 [de-identified] : Small Serosanguineous [de-identified] : Intact/ callus- callus shaved by Dr Catherine [de-identified] : 10% [de-identified] : 90% [de-identified] : Ace Bandages for bandage support applied at pt request & Dr Florin marquez [de-identified] : Silver Alginate [de-identified] : Mechanically cleansed with Sterile Gauze & Normal saline\par  [FreeTextEntry7] : Plantar Foot [FreeTextEntry8] : 1.1 [FreeTextEntry9] : 1.5 [de-identified] : 0.3 [de-identified] : Small Serosanguineous [de-identified] : Intact/ callus [de-identified] : Ace Bandages for bandage support applied at pt request & Dr Florin marquez [de-identified] : Silver Alginate [de-identified] : Mechanically cleansed with Sterile Gauze & Normal saline\par  [TWNoteComboBox1] : Right [de-identified] : None [de-identified] : None [de-identified] : Yes [de-identified] : 3x Weekly [de-identified] : Primary Dressing [TWNoteComboBox9] : Right [de-identified] : None [de-identified] : None [de-identified] : 100% [de-identified] : No [de-identified] : 3x Weekly [de-identified] : Primary Dressing

## 2022-12-24 NOTE — REVIEW OF SYSTEMS
[Fever] : no fever [Chills] : no chills [Eye Pain] : no eye pain [Loss Of Hearing] : no hearing loss [Shortness Of Breath] : no shortness of breath [Abdominal Pain] : no abdominal pain [Vomiting] : no vomiting [Skin Lesions] : no skin lesions [Skin Wound] : no skin wound [Anxiety] : no anxiety [Easy Bleeding] : no tendency for easy bleeding [FreeTextEntry5] : HTN,HLD [FreeTextEntry7] : 40.9 BMI , morbid obesity  [FreeTextEntry8] : Kidney Transplant  [FreeTextEntry9] : Associated Diabetic Charcot foot  [de-identified] : plantar right foot midfoot , lateral  DFU 2  , the ulcer is clean and granular s/p surgical intervention  [de-identified] : IDDM with neuropathy  [de-identified] : MARILUZ

## 2022-12-24 NOTE — PLAN
[FreeTextEntry1] : Patient examined and evaluated at this time.  Discussed etiology of symptoms and treatment options including conservative versus surgical interventions. Also discussed possible reconstruction of the right foot via Charcot beaming but patient remains at high risk for, life-threatening sepsis, limb loss, and death.  Will authorize for bone biopsies of the right foot.  If bone biopsies are negative, we will plan for right peroneus brevis tendon detachment with fifth metatarsal base exostectomy and reattachment of the peroneus brevis tendon, right cuboid plantar exostectomy, right tendo Achilles lengthening, possible posterior tibialis tendon lengthening versus tenotomy, and right fifth digit proximal phalanx base resection.  Patient and wife understands these potential risks and complications associated with surgical intervention.  All questions answered to satisfaction and patient and wife verbalized understanding. Continue local wound care and offloading at this time.  Spent 20 minutes on patient care and medical decision making.  Patient to follow-up in 1 week.

## 2023-01-04 ENCOUNTER — OUTPATIENT (OUTPATIENT)
Dept: OUTPATIENT SERVICES | Facility: HOSPITAL | Age: 58
LOS: 1 days | Discharge: HOME | End: 2023-01-04
Payer: MEDICARE

## 2023-01-04 ENCOUNTER — APPOINTMENT (OUTPATIENT)
Dept: WOUND CARE | Facility: HOSPITAL | Age: 58
End: 2023-01-04
Payer: MEDICARE

## 2023-01-04 VITALS — SYSTOLIC BLOOD PRESSURE: 168 MMHG | DIASTOLIC BLOOD PRESSURE: 78 MMHG

## 2023-01-04 VITALS
HEART RATE: 70 BPM | RESPIRATION RATE: 20 BRPM | TEMPERATURE: 98.5 F | HEIGHT: 73 IN | WEIGHT: 315 LBS | BODY MASS INDEX: 41.75 KG/M2 | OXYGEN SATURATION: 95 % | SYSTOLIC BLOOD PRESSURE: 179 MMHG | DIASTOLIC BLOOD PRESSURE: 75 MMHG

## 2023-01-04 DIAGNOSIS — Z79.4 LONG TERM (CURRENT) USE OF INSULIN: ICD-10-CM

## 2023-01-04 DIAGNOSIS — L97.412 NON-PRESSURE CHRONIC ULCER OF RIGHT HEEL AND MIDFOOT WITH FAT LAYER EXPOSED: ICD-10-CM

## 2023-01-04 DIAGNOSIS — E11.610 TYPE 2 DIABETES MELLITUS WITH DIABETIC NEUROPATHIC ARTHROPATHY: ICD-10-CM

## 2023-01-04 DIAGNOSIS — Z94.0 KIDNEY TRANSPLANT STATUS: ICD-10-CM

## 2023-01-04 DIAGNOSIS — Z89.421 ACQUIRED ABSENCE OF OTHER RIGHT TOE(S): Chronic | ICD-10-CM

## 2023-01-04 DIAGNOSIS — N18.6 END STAGE RENAL DISEASE: Chronic | ICD-10-CM

## 2023-01-04 DIAGNOSIS — E78.5 HYPERLIPIDEMIA, UNSPECIFIED: ICD-10-CM

## 2023-01-04 DIAGNOSIS — L84 CORNS AND CALLOSITIES: ICD-10-CM

## 2023-01-04 DIAGNOSIS — Z79.899 OTHER LONG TERM (CURRENT) DRUG THERAPY: ICD-10-CM

## 2023-01-04 DIAGNOSIS — Z98.890 OTHER SPECIFIED POSTPROCEDURAL STATES: Chronic | ICD-10-CM

## 2023-01-04 DIAGNOSIS — Z86.73 PERSONAL HISTORY OF TRANSIENT ISCHEMIC ATTACK (TIA), AND CEREBRAL INFARCTION WITHOUT RESIDUAL DEFICITS: ICD-10-CM

## 2023-01-04 DIAGNOSIS — Z94.0 KIDNEY TRANSPLANT STATUS: Chronic | ICD-10-CM

## 2023-01-04 DIAGNOSIS — L97.512 NON-PRESSURE CHRONIC ULCER OF OTHER PART OF RIGHT FOOT WITH FAT LAYER EXPOSED: ICD-10-CM

## 2023-01-04 DIAGNOSIS — Z87.81 PERSONAL HISTORY OF (HEALED) TRAUMATIC FRACTURE: ICD-10-CM

## 2023-01-04 DIAGNOSIS — E11.51 TYPE 2 DIABETES MELLITUS WITH DIABETIC PERIPHERAL ANGIOPATHY WITHOUT GANGRENE: ICD-10-CM

## 2023-01-04 DIAGNOSIS — E11.621 TYPE 2 DIABETES MELLITUS WITH FOOT ULCER: ICD-10-CM

## 2023-01-04 DIAGNOSIS — I10 ESSENTIAL (PRIMARY) HYPERTENSION: ICD-10-CM

## 2023-01-04 DIAGNOSIS — G47.33 OBSTRUCTIVE SLEEP APNEA (ADULT) (PEDIATRIC): ICD-10-CM

## 2023-01-04 DIAGNOSIS — Z85.828 PERSONAL HISTORY OF OTHER MALIGNANT NEOPLASM OF SKIN: ICD-10-CM

## 2023-01-04 DIAGNOSIS — Z86.718 PERSONAL HISTORY OF OTHER VENOUS THROMBOSIS AND EMBOLISM: ICD-10-CM

## 2023-01-04 DIAGNOSIS — Z89.422 ACQUIRED ABSENCE OF OTHER LEFT TOE(S): ICD-10-CM

## 2023-01-04 DIAGNOSIS — Z89.421 ACQUIRED ABSENCE OF OTHER RIGHT TOE(S): ICD-10-CM

## 2023-01-04 DIAGNOSIS — Z86.16 PERSONAL HISTORY OF COVID-19: ICD-10-CM

## 2023-01-04 PROCEDURE — 88311 DECALCIFY TISSUE: CPT | Mod: 26

## 2023-01-04 PROCEDURE — 20220 BONE BIOPSY TROCAR/NDL SUPFC: CPT | Mod: RT

## 2023-01-04 PROCEDURE — 20220 BONE BIOPSY TROCAR/NDL SUPFC: CPT

## 2023-01-04 PROCEDURE — 88304 TISSUE EXAM BY PATHOLOGIST: CPT

## 2023-01-04 PROCEDURE — 88304 TISSUE EXAM BY PATHOLOGIST: CPT | Mod: 26

## 2023-01-04 PROCEDURE — 88311 DECALCIFY TISSUE: CPT

## 2023-01-12 ENCOUNTER — APPOINTMENT (OUTPATIENT)
Dept: WOUND CARE | Facility: HOSPITAL | Age: 58
End: 2023-01-12
Payer: MEDICARE

## 2023-01-12 ENCOUNTER — OUTPATIENT (OUTPATIENT)
Dept: OUTPATIENT SERVICES | Facility: HOSPITAL | Age: 58
LOS: 1 days | Discharge: HOME | End: 2023-01-12
Payer: MEDICARE

## 2023-01-12 VITALS
DIASTOLIC BLOOD PRESSURE: 76 MMHG | BODY MASS INDEX: 41.75 KG/M2 | HEART RATE: 65 BPM | OXYGEN SATURATION: 94 % | SYSTOLIC BLOOD PRESSURE: 159 MMHG | HEIGHT: 73 IN | WEIGHT: 315 LBS | TEMPERATURE: 98.2 F | RESPIRATION RATE: 20 BRPM

## 2023-01-12 DIAGNOSIS — Z87.81 PERSONAL HISTORY OF (HEALED) TRAUMATIC FRACTURE: ICD-10-CM

## 2023-01-12 DIAGNOSIS — Z89.421 ACQUIRED ABSENCE OF OTHER RIGHT TOE(S): Chronic | ICD-10-CM

## 2023-01-12 DIAGNOSIS — Z79.4 LONG TERM (CURRENT) USE OF INSULIN: ICD-10-CM

## 2023-01-12 DIAGNOSIS — Z89.421 ACQUIRED ABSENCE OF OTHER RIGHT TOE(S): ICD-10-CM

## 2023-01-12 DIAGNOSIS — Z83.3 FAMILY HISTORY OF DIABETES MELLITUS: ICD-10-CM

## 2023-01-12 DIAGNOSIS — Z94.0 KIDNEY TRANSPLANT STATUS: Chronic | ICD-10-CM

## 2023-01-12 DIAGNOSIS — Z80.3 FAMILY HISTORY OF MALIGNANT NEOPLASM OF BREAST: ICD-10-CM

## 2023-01-12 DIAGNOSIS — Z86.73 PERSONAL HISTORY OF TRANSIENT ISCHEMIC ATTACK (TIA), AND CEREBRAL INFARCTION WITHOUT RESIDUAL DEFICITS: ICD-10-CM

## 2023-01-12 DIAGNOSIS — Z98.890 OTHER SPECIFIED POSTPROCEDURAL STATES: Chronic | ICD-10-CM

## 2023-01-12 DIAGNOSIS — E11.610 TYPE 2 DIABETES MELLITUS WITH DIABETIC NEUROPATHIC ARTHROPATHY: ICD-10-CM

## 2023-01-12 DIAGNOSIS — L97.519 NON-PRESSURE CHRONIC ULCER OF OTHER PART OF RIGHT FOOT WITH UNSPECIFIED SEVERITY: ICD-10-CM

## 2023-01-12 DIAGNOSIS — E78.5 HYPERLIPIDEMIA, UNSPECIFIED: ICD-10-CM

## 2023-01-12 DIAGNOSIS — G47.33 OBSTRUCTIVE SLEEP APNEA (ADULT) (PEDIATRIC): ICD-10-CM

## 2023-01-12 DIAGNOSIS — L97.512 NON-PRESSURE CHRONIC ULCER OF OTHER PART OF RIGHT FOOT WITH FAT LAYER EXPOSED: ICD-10-CM

## 2023-01-12 DIAGNOSIS — Z79.899 OTHER LONG TERM (CURRENT) DRUG THERAPY: ICD-10-CM

## 2023-01-12 DIAGNOSIS — E11.621 TYPE 2 DIABETES MELLITUS WITH FOOT ULCER: ICD-10-CM

## 2023-01-12 DIAGNOSIS — Z94.0 KIDNEY TRANSPLANT STATUS: ICD-10-CM

## 2023-01-12 DIAGNOSIS — E11.51 TYPE 2 DIABETES MELLITUS WITH DIABETIC PERIPHERAL ANGIOPATHY WITHOUT GANGRENE: ICD-10-CM

## 2023-01-12 DIAGNOSIS — Z86.718 PERSONAL HISTORY OF OTHER VENOUS THROMBOSIS AND EMBOLISM: ICD-10-CM

## 2023-01-12 DIAGNOSIS — Z89.422 ACQUIRED ABSENCE OF OTHER LEFT TOE(S): ICD-10-CM

## 2023-01-12 DIAGNOSIS — Z80.8 FAMILY HISTORY OF MALIGNANT NEOPLASM OF OTHER ORGANS OR SYSTEMS: ICD-10-CM

## 2023-01-12 DIAGNOSIS — Z85.828 PERSONAL HISTORY OF OTHER MALIGNANT NEOPLASM OF SKIN: ICD-10-CM

## 2023-01-12 DIAGNOSIS — L97.412 NON-PRESSURE CHRONIC ULCER OF RIGHT HEEL AND MIDFOOT WITH FAT LAYER EXPOSED: ICD-10-CM

## 2023-01-12 DIAGNOSIS — L84 CORNS AND CALLOSITIES: ICD-10-CM

## 2023-01-12 DIAGNOSIS — Z86.16 PERSONAL HISTORY OF COVID-19: ICD-10-CM

## 2023-01-12 DIAGNOSIS — N18.6 END STAGE RENAL DISEASE: Chronic | ICD-10-CM

## 2023-01-12 PROBLEM — J90 PLEURAL EFFUSION: Status: ACTIVE | Noted: 2022-07-15

## 2023-01-12 PROCEDURE — G0463: CPT

## 2023-01-12 PROCEDURE — 99213 OFFICE O/P EST LOW 20 MIN: CPT

## 2023-01-12 NOTE — VITALS
[Pain related to present condition?] : The patient's  pain is not related to present condition. [] : No [de-identified] : 0/10

## 2023-01-12 NOTE — REVIEW OF SYSTEMS
[Fever] : no fever [Chills] : no chills [Eye Pain] : no eye pain [Loss Of Hearing] : no hearing loss [Shortness Of Breath] : no shortness of breath [Abdominal Pain] : no abdominal pain [Vomiting] : no vomiting [Skin Lesions] : no skin lesions [Skin Wound] : no skin wound [Anxiety] : no anxiety [Easy Bleeding] : no tendency for easy bleeding [FreeTextEntry5] : HTN,HLD [FreeTextEntry7] : 40.9 BMI , morbid obesity  [FreeTextEntry8] : Kidney Transplant  [FreeTextEntry9] : Associated Diabetic Charcot foot  [de-identified] : plantar right foot midfoot , lateral  DFU 2  , the ulcer is clean and granular s/p surgical intervention  [de-identified] : IDDM with neuropathy  [de-identified] : MARILUZ

## 2023-01-12 NOTE — PLAN
[FreeTextEntry1] : Patient examined and evaluated at this time.  Bone biopsy results with patient and family.  Discussed etiology of symptoms and treatment options including conservative versus surgical interventions. Also discussed possible reconstruction of the right foot via Charcot beaming but patient remains at high risk for, life-threatening sepsis, limb loss, and death. Will plan for right peroneus brevis tendon detachment with fifth metatarsal base and exostectomy and reattachment of the peroneus brevis tendon, right cuboid plantar exostectomy, right tendo Achilles lengthening, posterior tibialis tendon lengthening, and right fifth digit proximal phalanx base resection.  Patient and family understand the potential risks and complications associated with surgical intervention.  All questions answered to satisfaction and patient and wife verbalized understanding. Continue local wound care and offloading at this time.  Spent 20 minutes on patient care and medical decision making.

## 2023-01-12 NOTE — ASSESSMENT
[FreeTextEntry1] : 57 year old morbidly obese diabetic male, s/p FB, Right VATS, drainage of pleural effusion, pleural biopsy, and total pulmonary decortication 11/10/2022. \par \par Patient is here today for a follow up for wound check.

## 2023-01-12 NOTE — HISTORY OF PRESENT ILLNESS
[FreeTextEntry1] : Patient seen for follow-up of right plantar fifth metatarsal base and lateral head of the fifth metatarsal diabetic foot ulceration is down to skin, subcutaneous tissue, and fat.  Patient is status post bone biopsies which are negative for osteomyelitis at this time.  Patient relates that the dressings have been changed as advised since his last encounter and denies any other complaints at this time.  Patient relates that he has considered treatment options and relate that he would like to pursue surgical intervention at this time.

## 2023-01-12 NOTE — REASON FOR VISIT
[de-identified] : FB, Right VATS, drainage of pleural effusion, pleural biopsy, and total pulmonary decortication [de-identified] : 11/10/2022

## 2023-01-12 NOTE — ASSESSMENT
[Verbal] : Verbal [Demo] : Demo [Patient] : Patient [Spouse] : Spouse [Good - alert, interested, motivated] : Good - alert, interested, motivated [Verbalizes knowledge/Understanding] : Verbalizes knowledge/understanding [Dressing changes] : dressing changes [Foot Care] : foot care [Skin Care] : skin care [Pressure relief] : pressure relief [Signs and symptoms of infection] : sign and symptoms of infection [Surgery] : surgery [How and When to Call] : how and when to call [Labs and Tests] : labs and tests [Off-loading] : off-loading [Patient responsibility to plan of care] : patient responsibility to plan of care [Glycemic Control] : glycemic control [Stable] : stable [Home] : Home [Ambulatory] : Ambulatory [Not Applicable - Long Term Care/Home Health Agency] : Long Term Care/Home Health Agency: Not Applicable [] : Yes [FreeTextEntry2] : Infection prevention \par Wound care (dressing changes)\par Maintain optimal skin integrity to high pressure areas\par Compression therapy\par Nutrition and wound healing\par Elevation and low sodium compliance.\par Offloading the stress on skin structures and decreasing potential pathologic biomechanical influences.\par Weight reduction Strategies\par Glycemic control\par Patient will follow up on 1/17/23 at [place] [facility] to be admitted for surgical intervention. [FreeTextEntry4] : No s/s of infection noted\par DPM reviewed pathology results with the patient.\par Patient will follow up on 1/17/23 at [place] [facility] to be admitted for surgical intervention.

## 2023-01-12 NOTE — PHYSICAL EXAM
[4 x 4] : 4 x 4  [Abdominal Pad] : Abdominal Pad [0] : left 0 [1+] : left 1+ [Varicose Veins Of Lower Extremities] : not present [] : not present [Purpura] : no purpura  [Petechiae] : no petechiae [Skin Ulcer] : ulcer [Skin Induration] : no induration [Alert] : alert [Oriented to Person] : oriented to person [Oriented to Place] : oriented to place [Oriented to Time] : oriented to time [Calm] : calm [de-identified] : adult WM, NAD, alert, Ox 3. [de-identified] : HTN, HLD [de-identified] : Diabetic Charcot right foot deformity [de-identified] : right foot plantar ulcer 5th metatarsal ulcer down to skin, subcutaneous tissue, and fat. ulcer lateral head of 5th metatarsal down to skin, subcutaneous tissue, fat. [de-identified] : Diabetic neuropathy  [FreeTextEntry1] :  Foot Lateral 5th Met [FreeTextEntry2] : 0.5 [FreeTextEntry3] : 0.6 [FreeTextEntry4] : 1.1 [de-identified] : small serous [de-identified] : callus [de-identified] : 90% [de-identified] : 10% [FreeTextEntry5] : O [de-identified] : Silver Alginate [de-identified] : Mechanically cleansed with sterile gauze and normal saline 0.9%\par \par  [FreeTextEntry7] : Plantar Foot [FreeTextEntry8] : 1.2 [FreeTextEntry9] : 1.5 [de-identified] : 0.3 [de-identified] : serosanguineous [de-identified] : callus [de-identified] : Silver Alginate [de-identified] : Mechanically cleansed with sterile gauze and normal saline 0.9%\par  [TWNoteComboBox1] : Right [TWNoteComboBox4] : Small [TWNoteComboBox6] : Other [de-identified] : No [de-identified] : other [de-identified] : None [de-identified] : None [de-identified] : Yes [de-identified] : False [de-identified] : Daily [de-identified] : Primary Dressing [TWNoteComboBox9] : Right [de-identified] : Small [de-identified] : No [de-identified] : Pressure [de-identified] : No [de-identified] : other [de-identified] : None [de-identified] : None [de-identified] : 100% [de-identified] : No [de-identified] : Daily [de-identified] : Primary Dressing

## 2023-01-16 NOTE — PHYSICAL THERAPY INITIAL EVALUATION ADULT - SIT-TO-STAND BALANCE
good balance

## 2023-01-17 ENCOUNTER — INPATIENT (INPATIENT)
Facility: HOSPITAL | Age: 58
LOS: 2 days | Discharge: HOME | DRG: 41 | End: 2023-01-20
Attending: STUDENT IN AN ORGANIZED HEALTH CARE EDUCATION/TRAINING PROGRAM | Admitting: INTERNAL MEDICINE
Payer: MEDICARE

## 2023-01-17 ENCOUNTER — TRANSCRIPTION ENCOUNTER (OUTPATIENT)
Age: 58
End: 2023-01-17

## 2023-01-17 VITALS
DIASTOLIC BLOOD PRESSURE: 73 MMHG | TEMPERATURE: 98 F | OXYGEN SATURATION: 97 % | HEIGHT: 73 IN | RESPIRATION RATE: 16 BRPM | HEART RATE: 64 BPM | WEIGHT: 315 LBS | SYSTOLIC BLOOD PRESSURE: 121 MMHG

## 2023-01-17 DIAGNOSIS — E11.610 TYPE 2 DIABETES MELLITUS WITH DIABETIC NEUROPATHIC ARTHROPATHY: ICD-10-CM

## 2023-01-17 DIAGNOSIS — Z94.0 KIDNEY TRANSPLANT STATUS: ICD-10-CM

## 2023-01-17 DIAGNOSIS — J90 PLEURAL EFFUSION, NOT ELSEWHERE CLASSIFIED: ICD-10-CM

## 2023-01-17 DIAGNOSIS — I63.9 CEREBRAL INFARCTION, UNSPECIFIED: ICD-10-CM

## 2023-01-17 DIAGNOSIS — N18.6 END STAGE RENAL DISEASE: Chronic | ICD-10-CM

## 2023-01-17 DIAGNOSIS — Z98.890 OTHER SPECIFIED POSTPROCEDURAL STATES: Chronic | ICD-10-CM

## 2023-01-17 DIAGNOSIS — Z94.0 KIDNEY TRANSPLANT STATUS: Chronic | ICD-10-CM

## 2023-01-17 DIAGNOSIS — G47.33 OBSTRUCTIVE SLEEP APNEA (ADULT) (PEDIATRIC): ICD-10-CM

## 2023-01-17 DIAGNOSIS — I10 ESSENTIAL (PRIMARY) HYPERTENSION: ICD-10-CM

## 2023-01-17 DIAGNOSIS — M14.671 CHARCOT'S JOINT, RIGHT ANKLE AND FOOT: ICD-10-CM

## 2023-01-17 DIAGNOSIS — Z29.9 ENCOUNTER FOR PROPHYLACTIC MEASURES, UNSPECIFIED: ICD-10-CM

## 2023-01-17 DIAGNOSIS — E10.9 TYPE 1 DIABETES MELLITUS WITHOUT COMPLICATIONS: ICD-10-CM

## 2023-01-17 DIAGNOSIS — E11.621 TYPE 2 DIABETES MELLITUS WITH FOOT ULCER: ICD-10-CM

## 2023-01-17 DIAGNOSIS — Z89.421 ACQUIRED ABSENCE OF OTHER RIGHT TOE(S): Chronic | ICD-10-CM

## 2023-01-17 LAB
A1C WITH ESTIMATED AVERAGE GLUCOSE RESULT: 7.2 % — HIGH (ref 4–5.6)
ALBUMIN SERPL ELPH-MCNC: 3.6 G/DL — SIGNIFICANT CHANGE UP (ref 3.3–5)
ALP SERPL-CCNC: 108 U/L — SIGNIFICANT CHANGE UP (ref 40–120)
ALT FLD-CCNC: 16 U/L — SIGNIFICANT CHANGE UP (ref 12–78)
ANION GAP SERPL CALC-SCNC: 3 MMOL/L — LOW (ref 5–17)
APTT BLD: 30.2 SEC — SIGNIFICANT CHANGE UP (ref 27.5–35.5)
AST SERPL-CCNC: 14 U/L — LOW (ref 15–37)
BASOPHILS # BLD AUTO: 0.05 K/UL — SIGNIFICANT CHANGE UP (ref 0–0.2)
BASOPHILS NFR BLD AUTO: 0.8 % — SIGNIFICANT CHANGE UP (ref 0–2)
BILIRUB SERPL-MCNC: 0.4 MG/DL — SIGNIFICANT CHANGE UP (ref 0.2–1.2)
BUN SERPL-MCNC: 28 MG/DL — HIGH (ref 7–23)
CALCIUM SERPL-MCNC: 9.4 MG/DL — SIGNIFICANT CHANGE UP (ref 8.5–10.1)
CHLORIDE SERPL-SCNC: 108 MMOL/L — SIGNIFICANT CHANGE UP (ref 96–108)
CO2 SERPL-SCNC: 30 MMOL/L — SIGNIFICANT CHANGE UP (ref 22–31)
CREAT SERPL-MCNC: 1.3 MG/DL — SIGNIFICANT CHANGE UP (ref 0.5–1.3)
EGFR: 64 ML/MIN/1.73M2 — SIGNIFICANT CHANGE UP
EOSINOPHIL # BLD AUTO: 0.25 K/UL — SIGNIFICANT CHANGE UP (ref 0–0.5)
EOSINOPHIL NFR BLD AUTO: 3.9 % — SIGNIFICANT CHANGE UP (ref 0–6)
ERYTHROCYTE [SEDIMENTATION RATE] IN BLOOD: 21 MM/HR — HIGH (ref 0–20)
ESTIMATED AVERAGE GLUCOSE: 160 MG/DL — HIGH (ref 68–114)
GLUCOSE SERPL-MCNC: 75 MG/DL — SIGNIFICANT CHANGE UP (ref 70–99)
HCT VFR BLD CALC: 40.3 % — SIGNIFICANT CHANGE UP (ref 39–50)
HGB BLD-MCNC: 12.6 G/DL — LOW (ref 13–17)
IMM GRANULOCYTES NFR BLD AUTO: 0.5 % — SIGNIFICANT CHANGE UP (ref 0–0.9)
INR BLD: 1.03 RATIO — SIGNIFICANT CHANGE UP (ref 0.88–1.16)
LACTATE SERPL-SCNC: 1.2 MMOL/L — SIGNIFICANT CHANGE UP (ref 0.7–2)
LYMPHOCYTES # BLD AUTO: 1.06 K/UL — SIGNIFICANT CHANGE UP (ref 1–3.3)
LYMPHOCYTES # BLD AUTO: 16.5 % — SIGNIFICANT CHANGE UP (ref 13–44)
MCHC RBC-ENTMCNC: 27.3 PG — SIGNIFICANT CHANGE UP (ref 27–34)
MCHC RBC-ENTMCNC: 31.3 GM/DL — LOW (ref 32–36)
MCV RBC AUTO: 87.4 FL — SIGNIFICANT CHANGE UP (ref 80–100)
MONOCYTES # BLD AUTO: 0.57 K/UL — SIGNIFICANT CHANGE UP (ref 0–0.9)
MONOCYTES NFR BLD AUTO: 8.9 % — SIGNIFICANT CHANGE UP (ref 2–14)
NEUTROPHILS # BLD AUTO: 4.48 K/UL — SIGNIFICANT CHANGE UP (ref 1.8–7.4)
NEUTROPHILS NFR BLD AUTO: 69.4 % — SIGNIFICANT CHANGE UP (ref 43–77)
NRBC # BLD: 0 /100 WBCS — SIGNIFICANT CHANGE UP (ref 0–0)
PLATELET # BLD AUTO: 235 K/UL — SIGNIFICANT CHANGE UP (ref 150–400)
POTASSIUM SERPL-MCNC: 4.2 MMOL/L — SIGNIFICANT CHANGE UP (ref 3.5–5.3)
POTASSIUM SERPL-SCNC: 4.2 MMOL/L — SIGNIFICANT CHANGE UP (ref 3.5–5.3)
PROT SERPL-MCNC: 8 G/DL — SIGNIFICANT CHANGE UP (ref 6–8.3)
PROTHROM AB SERPL-ACNC: 12.1 SEC — SIGNIFICANT CHANGE UP (ref 10.5–13.4)
RBC # BLD: 4.61 M/UL — SIGNIFICANT CHANGE UP (ref 4.2–5.8)
RBC # FLD: 15 % — HIGH (ref 10.3–14.5)
SARS-COV-2 RNA SPEC QL NAA+PROBE: SIGNIFICANT CHANGE UP
SODIUM SERPL-SCNC: 141 MMOL/L — SIGNIFICANT CHANGE UP (ref 135–145)
WBC # BLD: 6.44 K/UL — SIGNIFICANT CHANGE UP (ref 3.8–10.5)
WBC # FLD AUTO: 6.44 K/UL — SIGNIFICANT CHANGE UP (ref 3.8–10.5)

## 2023-01-17 PROCEDURE — 73630 X-RAY EXAM OF FOOT: CPT | Mod: 26,RT

## 2023-01-17 PROCEDURE — 99222 1ST HOSP IP/OBS MODERATE 55: CPT

## 2023-01-17 PROCEDURE — 93010 ELECTROCARDIOGRAM REPORT: CPT

## 2023-01-17 PROCEDURE — 99221 1ST HOSP IP/OBS SF/LOW 40: CPT

## 2023-01-17 PROCEDURE — 99285 EMERGENCY DEPT VISIT HI MDM: CPT | Mod: FS

## 2023-01-17 PROCEDURE — 71045 X-RAY EXAM CHEST 1 VIEW: CPT | Mod: 26

## 2023-01-17 PROCEDURE — 99223 1ST HOSP IP/OBS HIGH 75: CPT | Mod: GC

## 2023-01-17 RX ORDER — GABAPENTIN 400 MG/1
1 CAPSULE ORAL
Qty: 0 | Refills: 0 | DISCHARGE

## 2023-01-17 RX ORDER — HYDRALAZINE HCL 50 MG
1 TABLET ORAL
Qty: 0 | Refills: 0 | DISCHARGE

## 2023-01-17 RX ORDER — ENOXAPARIN SODIUM 100 MG/ML
40 INJECTION SUBCUTANEOUS ONCE
Refills: 0 | Status: COMPLETED | OUTPATIENT
Start: 2023-01-17 | End: 2023-01-17

## 2023-01-17 RX ORDER — ACETAMINOPHEN 500 MG
650 TABLET ORAL EVERY 6 HOURS
Refills: 0 | Status: DISCONTINUED | OUTPATIENT
Start: 2023-01-17 | End: 2023-01-18

## 2023-01-17 RX ORDER — AZATHIOPRINE 100 MG/1
100 TABLET ORAL DAILY
Refills: 0 | Status: DISCONTINUED | OUTPATIENT
Start: 2023-01-17 | End: 2023-01-18

## 2023-01-17 RX ORDER — SODIUM CHLORIDE 9 MG/ML
1000 INJECTION, SOLUTION INTRAVENOUS
Refills: 0 | Status: DISCONTINUED | OUTPATIENT
Start: 2023-01-17 | End: 2023-01-18

## 2023-01-17 RX ORDER — HYDRALAZINE HCL 50 MG
100 TABLET ORAL THREE TIMES A DAY
Refills: 0 | Status: DISCONTINUED | OUTPATIENT
Start: 2023-01-17 | End: 2023-01-18

## 2023-01-17 RX ORDER — ASPIRIN/CALCIUM CARB/MAGNESIUM 324 MG
81 TABLET ORAL DAILY
Refills: 0 | Status: DISCONTINUED | OUTPATIENT
Start: 2023-01-17 | End: 2023-01-18

## 2023-01-17 RX ORDER — ATORVASTATIN CALCIUM 80 MG/1
10 TABLET, FILM COATED ORAL AT BEDTIME
Refills: 0 | Status: DISCONTINUED | OUTPATIENT
Start: 2023-01-17 | End: 2023-01-18

## 2023-01-17 RX ORDER — METOPROLOL TARTRATE 50 MG
1 TABLET ORAL
Qty: 0 | Refills: 0 | DISCHARGE

## 2023-01-17 RX ORDER — ACETAMINOPHEN 500 MG
2 TABLET ORAL
Qty: 0 | Refills: 0 | DISCHARGE

## 2023-01-17 RX ORDER — ENOXAPARIN SODIUM 100 MG/ML
40 INJECTION SUBCUTANEOUS EVERY 12 HOURS
Refills: 0 | Status: DISCONTINUED | OUTPATIENT
Start: 2023-01-17 | End: 2023-01-17

## 2023-01-17 RX ORDER — DEXTROSE 50 % IN WATER 50 %
15 SYRINGE (ML) INTRAVENOUS ONCE
Refills: 0 | Status: DISCONTINUED | OUTPATIENT
Start: 2023-01-17 | End: 2023-01-18

## 2023-01-17 RX ORDER — METOPROLOL TARTRATE 50 MG
50 TABLET ORAL
Refills: 0 | Status: DISCONTINUED | OUTPATIENT
Start: 2023-01-17 | End: 2023-01-18

## 2023-01-17 RX ORDER — TACROLIMUS 5 MG/1
1.5 CAPSULE ORAL
Refills: 0 | Status: DISCONTINUED | OUTPATIENT
Start: 2023-01-17 | End: 2023-01-17

## 2023-01-17 RX ORDER — DEXTROSE 50 % IN WATER 50 %
12.5 SYRINGE (ML) INTRAVENOUS ONCE
Refills: 0 | Status: DISCONTINUED | OUTPATIENT
Start: 2023-01-17 | End: 2023-01-18

## 2023-01-17 RX ORDER — TACROLIMUS 5 MG/1
1.5 CAPSULE ORAL
Refills: 0 | Status: DISCONTINUED | OUTPATIENT
Start: 2023-01-17 | End: 2023-01-18

## 2023-01-17 RX ORDER — INSULIN LISPRO 100/ML
1 VIAL (ML) SUBCUTANEOUS
Refills: 0 | Status: DISCONTINUED | OUTPATIENT
Start: 2023-01-17 | End: 2023-01-18

## 2023-01-17 RX ORDER — DEXTROSE 50 % IN WATER 50 %
25 SYRINGE (ML) INTRAVENOUS ONCE
Refills: 0 | Status: DISCONTINUED | OUTPATIENT
Start: 2023-01-17 | End: 2023-01-18

## 2023-01-17 RX ORDER — PIPERACILLIN AND TAZOBACTAM 4; .5 G/20ML; G/20ML
3.38 INJECTION, POWDER, LYOPHILIZED, FOR SOLUTION INTRAVENOUS ONCE
Refills: 0 | Status: COMPLETED | OUTPATIENT
Start: 2023-01-17 | End: 2023-01-17

## 2023-01-17 RX ORDER — GABAPENTIN 400 MG/1
300 CAPSULE ORAL
Refills: 0 | Status: DISCONTINUED | OUTPATIENT
Start: 2023-01-17 | End: 2023-01-18

## 2023-01-17 RX ORDER — INSULIN LISPRO 100/ML
1 VIAL (ML) SUBCUTANEOUS
Refills: 0 | Status: DISCONTINUED | OUTPATIENT
Start: 2023-01-17 | End: 2023-01-17

## 2023-01-17 RX ORDER — LANOLIN ALCOHOL/MO/W.PET/CERES
5 CREAM (GRAM) TOPICAL AT BEDTIME
Refills: 0 | Status: DISCONTINUED | OUTPATIENT
Start: 2023-01-17 | End: 2023-01-18

## 2023-01-17 RX ORDER — VANCOMYCIN HCL 1 G
1000 VIAL (EA) INTRAVENOUS ONCE
Refills: 0 | Status: COMPLETED | OUTPATIENT
Start: 2023-01-17 | End: 2023-01-17

## 2023-01-17 RX ORDER — GLUCAGON INJECTION, SOLUTION 0.5 MG/.1ML
1 INJECTION, SOLUTION SUBCUTANEOUS ONCE
Refills: 0 | Status: DISCONTINUED | OUTPATIENT
Start: 2023-01-17 | End: 2023-01-18

## 2023-01-17 RX ADMIN — ATORVASTATIN CALCIUM 10 MILLIGRAM(S): 80 TABLET, FILM COATED ORAL at 21:20

## 2023-01-17 RX ADMIN — GABAPENTIN 300 MILLIGRAM(S): 400 CAPSULE ORAL at 17:34

## 2023-01-17 RX ADMIN — Medication 50 MILLIGRAM(S): at 17:34

## 2023-01-17 RX ADMIN — ENOXAPARIN SODIUM 40 MILLIGRAM(S): 100 INJECTION SUBCUTANEOUS at 17:35

## 2023-01-17 RX ADMIN — Medication 5 MILLIGRAM(S): at 21:20

## 2023-01-17 RX ADMIN — Medication 0.1 MILLIGRAM(S): at 17:34

## 2023-01-17 RX ADMIN — TACROLIMUS 1.5 MILLIGRAM(S): 5 CAPSULE ORAL at 21:20

## 2023-01-17 RX ADMIN — Medication 250 MILLIGRAM(S): at 12:23

## 2023-01-17 RX ADMIN — PIPERACILLIN AND TAZOBACTAM 200 GRAM(S): 4; .5 INJECTION, POWDER, LYOPHILIZED, FOR SOLUTION INTRAVENOUS at 11:46

## 2023-01-17 RX ADMIN — Medication 100 MILLIGRAM(S): at 21:20

## 2023-01-17 NOTE — ED ADULT TRIAGE NOTE - CHIEF COMPLAINT QUOTE
Patient is a 56yo male sent by wound care for foot surgery Patient has a right foot non healing ulcer Odomzo Pregnancy And Lactation Text: This medication is Pregnancy Category X and is absolutely contraindicated during pregnancy. It is unknown if it is excreted in breast milk.

## 2023-01-17 NOTE — H&P ADULT - PROBLEM SELECTOR PLAN 7
Lovenox 40 mg BID Lovenox One time dose of lovenox tonight  - AC to be re-addressed tmmrw after procedure

## 2023-01-17 NOTE — PATIENT PROFILE ADULT - FALL HARM RISK - HARM RISK INTERVENTIONS

## 2023-01-17 NOTE — H&P ADULT - ASSESSMENT
57-year-old male with PMHx of type 1 diabetes, CVA, recurrent right pleural effusion with VATS, renal transplant sent in from wound care for admission for right Charcot foot surgery with Dr. Catherine.  57-year-old male with PMHx of type 1 diabetes mellitus, CVA, recurrent right pleural effusion with VATS, renal transplant sent in from wound care for admission for right Charcot foot surgery with Dr. Catherine.

## 2023-01-17 NOTE — CONSULT NOTE ADULT - ATTENDING COMMENTS
No signs of significant ischemia or volume overload. EKG nonischemic. Currently no active cardiac conditions. No signs of ischemia, ADHF, clinical exam not consistent with severe stenotic valvular disease, no unstable arrhythmias noted. Therefore able to proceed with this low risk podiatric surgery without any further cardiac workup. Routine hemodynamic monitoring is suggested during the procedure.

## 2023-01-17 NOTE — H&P ADULT - NSHPREVIEWOFSYSTEMS_GEN_ALL_CORE
CONSTITUTIONAL: denies fever, chills, fatigue, weakness  HEENT: denies blurred vision, sore throat  SKIN: denies new lesions, rash  CARDIOVASCULAR: denies chest pain, chest pressure, palpitations  RESPIRATORY: denies shortness of breath, cough, sputum production  GASTROINTESTINAL: denies nausea, vomiting, diarrhea, abdominal pain, melena or hematochezia  GENITOURINARY: denies dysuria, discharge  NEUROLOGICAL: denies numbness, headache, focal weakness  MUSCULOSKELETAL: denies new joint pain, muscle aches  HEMATOLOGIC: denies gross bleeding, bruising

## 2023-01-17 NOTE — H&P ADULT - HISTORY OF PRESENT ILLNESS
57-year-old male with PMHx of type 1 diabetes, CVA, recurrent right pleural effusion with VATS, renal transplant sent in from wound clinic for admission for right Charcot foot. Patient last saw  57-year-old male with PMHx of type 1 diabetes, CVA, recurrent right pleural effusion with VATS, renal transplant sent in from wound care for admission for right Charcot foot surgery with Dr. Catherine. Patient last seen at wound care with Dr. Catherine on . Patient is status post bone biopsies which are negative for osteomyelitis at this time. Sent into the hospital by Dr. Catherine for right peroneus brevis tendon detachment with fifth metatarsal base and exostectomy and reattachment of the peroneus brevis tendon, right cuboid plantar exostectomy, right tendon Achilles lengthening, posterior tibialis tendon lengthening, and right fifth digit proximal phalanx base resection. Patient with no complaints at this time.     ED course  Vitals: T 97.7, HR 64, /73, RR 16, SpO2 97% on RA  Significant labs: ESR 21, H 12.6, BUN 28   CXR shows rt sided pleural effusion; unchanged from previous  Xray Foot AP pending official read   EKst degree AV block, rate 66 bpm  In ED patient given: Vanc 1g x1, Zosyn 3.375g x1     57-year-old male with PMHx of type 1 diabetes mellitus, CVA, recurrent right pleural effusion with VATS, renal transplant sent in from wound care for admission for right Charcot foot surgery with Dr. Catherine. Patient last seen at wound care with Dr. Catherine on . Patient is status post bone biopsies which are negative for osteomyelitis at this time. Sent into the hospital by Dr. Catherine for right peroneus brevis tendon detachment with fifth metatarsal base and exostectomy and reattachment of the peroneus brevis tendon, right cuboid plantar exostectomy, right tendon Achilles lengthening, posterior tibialis tendon lengthening, and right fifth digit proximal phalanx base resection. Patient with no complaints at this time.     ED course  Vitals: T 97.7, HR 64, /73, RR 16, SpO2 97% on RA  Significant labs: ESR 21, H 12.6, BUN 28   CXR shows rt sided pleural effusion; unchanged from previous  Xray Foot AP pending official read   EKst degree AV block, rate 66 bpm  In ED patient given: Vanc 1g x1, Zosyn 3.375g x1

## 2023-01-17 NOTE — ED ADULT NURSE NOTE - OBJECTIVE STATEMENT
Patient received complaining of right foot ulcer x 1 year, states has been draining daily and self dressing daily. Referred by wound care for possible surgery. Patient has draining ulcer to bottom of right foot and lateral side of right foot, states is a diabetic with an insulin pump left flank. Patient is AOx4, ambulatory, safety precautions in place, interventions implemented, dressing changed and re-dressed by RN.

## 2023-01-17 NOTE — CONSULT NOTE ADULT - SUBJECTIVE AND OBJECTIVE BOX
Patient is a 57y old  Male who presents with a chief complaint of Right Charcot Foot (2023 15:40)    Type:1 DX 29 years old. known complications: Endocrine Last seen Rx home Hx DKA/HHS, Glucometer checks, needs, weight, diet, exercise  diabetes education provided  Hx ASCVD, + CKD, HF    HPI:  57-year-old male with PMHx of type 1 diabetes mellitus, CVA, recurrent right pleural effusion with VATS, renal transplant sent in from wound care for admission for right Charcot foot surgery with Dr. Catherine. Patient last seen at wound care with Dr. Catherine on . Patient is status post bone biopsies which are negative for osteomyelitis at this time. Sent into the hospital by Dr. Catherine for right peroneus brevis tendon detachment with fifth metatarsal base and exostectomy and reattachment of the peroneus brevis tendon, right cuboid plantar exostectomy, right tendon Achilles lengthening, posterior tibialis tendon lengthening, and right fifth digit proximal phalanx base resection. Patient with no complaints at this time.     ED course  Vitals: T 97.7, HR 64, /73, RR 16, SpO2 97% on RA  Significant labs: ESR 21, H 12.6, BUN 28   CXR shows rt sided pleural effusion; unchanged from previous  Xray Foot AP pending official read   EKst degree AV block, rate 66 bpm  In ED patient given: Vanc 1g x1, Zosyn 3.375g x1     (2023 14:03)      PAST MEDICAL & SURGICAL HISTORY:  Hypertension      Hyperlipidemia      Diabetes mellitus  Type 1 on insulin pump      CKD (chronic kidney disease)  Renal Transplant  at [facility]      Diabetic peripheral neuropathy      Obesity (BMI 30-39.9)      CVA (cerebral vascular accident)  Wallenberg Stroke  low L body sensation  low R face sensation  decreased peripheral vision  poor balance          Squamous cell carcinoma of skin of left ear  nose      History of DVT (deep vein thrombosis)  LLE , was on Eliquis x 6 months  Was hospitalized for Rhinovirus at the time, intubated      Recurrent squamous cell carcinoma of skin  nose, L arm      History of pleural effusion  right thoracentesis 2022 and 2022      CARMEN treated with BiPAP  2L O2 at night      History of restrictive lung disease      Toe infection   L 4th Toe surgery-amputation secondary to osteomyelitis   partial right 2nd 4th toe amputation      Ear disease  Left inner ear surgery, 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, Can NOT have MRI      Vasectomy status  s/p b/l  vasectomy      H/O foot surgery   - right foot - bone fracture - s/p ORIF and subsequent removal of hardware      End-stage renal failure with renal transplant  2013      S/P kidney transplant        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 1000 milliLiter(s) (50 mL/Hr) IV Continuous <Continuous>  dextrose 5%. 1000 milliLiter(s) (100 mL/Hr) IV Continuous <Continuous>  dextrose 50% Injectable 25 Gram(s) IV Push once  dextrose 50% Injectable 12.5 Gram(s) IV Push once  dextrose 50% Injectable 25 Gram(s) IV Push once  enoxaparin Injectable 40 milliGRAM(s) SubCutaneous once  gabapentin 300 milliGRAM(s) Oral two times a day  glucagon  Injectable 1 milliGRAM(s) IntraMuscular once  hydrALAZINE 100 milliGRAM(s) Oral three times a day  insulin lispro (HumaLOG) Pump 1 Each SubCutaneous Continuous Pump  metoprolol tartrate 50 milliGRAM(s) Oral two times a day  tacrolimus 1.5 milliGRAM(s) Oral <User Schedule>  trimethoprim   80 mG/sulfamethoxazole 400 mG 1 Tablet(s) Oral daily       Patient is a 57y old  Male who presents with a chief complaint of Right Charcot Foot (2023 15:40)    Type:1 DX 29 years old. Endocrine: Radha. Last seen: "a little of a month ago." next appt: at home will provide before DSC. Rx home : humalog minimed pump x 3 years. settings: uses basal rate at 3 units/hr. does not use CR or ISF- patient decides how many units and manually enters for # carbs taking for that meal. CGM dexcom not synched to pump. Has Glucometer  for back up at home. Has all insulin/CGM supplies denies any needs at this time. can prepared for hospitalization with 3 sets of pump and CGM supplies. diabetes education provided verbally recommended to lower basal rate at 12 midnight when he is NPO overnight. Patient understands and states knows how to reduced/temp basal.   Hx  + CKD,    HPI:  57-year-old male with PMHx of type 1 diabetes mellitus, CVA, recurrent right pleural effusion with VATS, renal transplant sent in from wound care for admission for right Charcot foot surgery with Dr. Catherine. Patient last seen at wound care with Dr. Catherine on . Patient is status post bone biopsies which are negative for osteomyelitis at this time. Sent into the hospital by Dr. Catherine for right peroneus brevis tendon detachment with fifth metatarsal base and exostectomy and reattachment of the peroneus brevis tendon, right cuboid plantar exostectomy, right tendon Achilles lengthening, posterior tibialis tendon lengthening, and right fifth digit proximal phalanx base resection. Patient with no complaints at this time.     ED course  Vitals: T 97.7, HR 64, /73, RR 16, SpO2 97% on RA  Significant labs: ESR 21, H 12.6, BUN 28   CXR shows rt sided pleural effusion; unchanged from previous  Xray Foot AP pending official read   EKst degree AV block, rate 66 bpm  In ED patient given: Vanc 1g x1, Zosyn 3.375g x1     (2023 14:03)      PAST MEDICAL & SURGICAL HISTORY:  Hypertension      Hyperlipidemia      Diabetes mellitus  Type 1 on insulin pump      CKD (chronic kidney disease)  Renal Transplant  at [facility]      Diabetic peripheral neuropathy      Obesity (BMI 30-39.9)      CVA (cerebral vascular accident)  Wallenberg Stroke  low L body sensation  low R face sensation  decreased peripheral vision  poor balance          Squamous cell carcinoma of skin of left ear  nose      History of DVT (deep vein thrombosis)  LLE , was on Eliquis x 6 months  Was hospitalized for Rhinovirus at the time, intubated      Recurrent squamous cell carcinoma of skin  nose, L arm      History of pleural effusion  right thoracentesis 2022 and 2022      CARMEN treated with BiPAP  2L O2 at night      History of restrictive lung disease      Toe infection   L 4th Toe surgery-amputation secondary to osteomyelitis   partial right 2nd 4th toe amputation      Ear disease  Left inner ear surgery, 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, Can NOT have MRI      Vasectomy status  s/p b/l  vasectomy      H/O foot surgery   - right foot - bone fracture - s/p ORIF and subsequent removal of hardware      End-stage renal failure with renal transplant  2013      S/P kidney transplant        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 1000 milliLiter(s) (50 mL/Hr) IV Continuous <Continuous>  dextrose 5%. 1000 milliLiter(s) (100 mL/Hr) IV Continuous <Continuous>  dextrose 50% Injectable 25 Gram(s) IV Push once  dextrose 50% Injectable 12.5 Gram(s) IV Push once  dextrose 50% Injectable 25 Gram(s) IV Push once  enoxaparin Injectable 40 milliGRAM(s) SubCutaneous once  gabapentin 300 milliGRAM(s) Oral two times a day  glucagon  Injectable 1 milliGRAM(s) IntraMuscular once  hydrALAZINE 100 milliGRAM(s) Oral three times a day  insulin lispro (HumaLOG) Pump 1 Each SubCutaneous Continuous Pump  metoprolol tartrate 50 milliGRAM(s) Oral two times a day  tacrolimus 1.5 milliGRAM(s) Oral <User Schedule>  trimethoprim   80 mG/sulfamethoxazole 400 mG 1 Tablet(s) Oral daily

## 2023-01-17 NOTE — H&P ADULT - PROBLEM SELECTOR PLAN 4
Chronic   - pt to use home insulin pump with home settings  - Patt Weil consulted Chronic   - pt to use home insulin pump with home settings  - Patt Weil consulted  - patient has continuous glucose monitor Chronic   - pt to use home insulin pump with home settings  - Pat Weil consulted  - patient has continuous glucose monitor

## 2023-01-17 NOTE — H&P ADULT - NSICDXPASTSURGICALHX_GEN_ALL_CORE_FT
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    
right shoulder pain

## 2023-01-17 NOTE — H&P ADULT - PROBLEM SELECTOR PLAN 3
Chronic   - continue home hydralazine, clonidine, metoprolol, losartan with hold parameters   - monitor hemodynamics

## 2023-01-17 NOTE — ED PROVIDER NOTE - CLINICAL SUMMARY MEDICAL DECISION MAKING FREE TEXT BOX
57-year-old male history of diabetes CVA recurrent right pleural effusions with VATS surgery presents from wound care for admission for OR for right foot Charcot toe, pt scheduled for OR for shaving of bone and tendon repair, no fevers, chills or pain, not currenrtly on abx  pulses intact, wlel appearing labs, admit for OR

## 2023-01-17 NOTE — ED PROVIDER NOTE - OBJECTIVE STATEMENT
Patient is 57-year-old male with past ministry of type 1 diabetes CVA recurrent right pleural effusion with VATS renal transplant sent in from wound clinic for admission for right foot Charcot toe.  Patient is scheduled for or for shaving of bone and tendon repair.  Patient states that his wounds about for 1 year and has had drainage which is worsening.  Patient denies any fever chills recent antibiotics.

## 2023-01-17 NOTE — H&P ADULT - PROBLEM SELECTOR PLAN 6
Chronic   - continue home bactrim, azathioprine, and tacrolimus Chronic   - continue home bactrim, azathioprine, and tacrolimus [doses confirmed with patient and pharmacy]  -nephrology consulted Dr Bryant  -anemia: no signs or symptoms of active bleeding. Continue to monitor hgb. Likely due to chronic disease

## 2023-01-17 NOTE — H&P ADULT - NSHPPHYSICALEXAM_GEN_ALL_CORE
T(C): 36.5 (01-17-23 @ 11:01), Max: 36.5 (01-17-23 @ 11:01)  HR: 64 (01-17-23 @ 11:01) (64 - 64)  BP: 121/73 (01-17-23 @ 11:01) (121/73 - 121/73)  RR: 16 (01-17-23 @ 11:01) (16 - 16)  SpO2: 97% (01-17-23 @ 11:01) (97% - 97%)    GENERAL: patient appears well, no acute distress, appropriately interactive  EYES: sclera clear, no exudates  ENMT: oropharynx clear without erythema, no exudates, moist mucous membranes  NECK: supple, soft  LUNGS: good air entry bilaterally, clear to auscultation, symmetric breath sounds, no wheezing or rhonchi appreciated  HEART: soft S1/S2, regular rate and rhythm, no murmurs noted, no lower extremity edema  GASTROINTESTINAL: abdomen is soft, nontender, nondistended, normoactive bowel sounds  INTEGUMENT: good skin turgor, warm skin, appears well perfused  MUSCULOSKELETAL: no clubbing or cyanosis, no obvious deformity  NEUROLOGIC: awake, alert, oriented x3, good muscle tone in all 4 extremities  HEME/LYMPH: no obvious ecchymosis or petechiae T(C): 36.5 (01-17-23 @ 11:01), Max: 36.5 (01-17-23 @ 11:01)  HR: 64 (01-17-23 @ 11:01) (64 - 64)  BP: 121/73 (01-17-23 @ 11:01) (121/73 - 121/73)  RR: 16 (01-17-23 @ 11:01) (16 - 16)  SpO2: 97% (01-17-23 @ 11:01) (97% - 97%)    GENERAL: patient appears well, no acute distress, appropriately interactive  EYES: sclera clear, no exudates  ENMT: oropharynx clear without erythema, no exudates, moist mucous membranes  NECK: supple, soft  LUNGS: good air entry bilaterally, clear to auscultation, symmetric breath sounds, no wheezing or rhonchi appreciated  HEART: soft S1/S2, regular rate and rhythm, no murmurs noted, +2 BL LE edema   GASTROINTESTINAL: abdomen is soft, nontender, nondistended, normoactive bowel sounds  INTEGUMENT: BL venous stasis changes  MUSCULOSKELETAL: +RLE bandaged, C/D/I; s/p amputation of tips of 2nd and 3rd digits of right foot   NEUROLOGIC: awake, alert, oriented x3, good muscle tone in all 4 extremities  HEME/LYMPH: no obvious ecchymosis or petechiae T(C): 36.5 (01-17-23 @ 11:01), Max: 36.5 (01-17-23 @ 11:01)  HR: 64 (01-17-23 @ 11:01) (64 - 64)  BP: 121/73 (01-17-23 @ 11:01) (121/73 - 121/73)  RR: 16 (01-17-23 @ 11:01) (16 - 16)  SpO2: 97% (01-17-23 @ 11:01) (97% - 97%)    GENERAL: patient appears well, no acute distress, appropriately interactive  EYES: sclera clear, no exudates  ENMT: oropharynx clear without erythema, no exudates, moist mucous membranes  NECK: supple, soft  LUNGS: good air entry bilaterally, clear to auscultation, symmetric breath sounds, no wheezing or rhonchi appreciated  HEART: soft S1/S2, regular rate and rhythm, no murmurs noted, +1 BL LE edema   GASTROINTESTINAL: abdomen is soft, nontender, nondistended, normoactive bowel sounds  INTEGUMENT: BL venous stasis changes  MUSCULOSKELETAL: +RLE bandaged, C/D/I; s/p amputation of tips of 2nd and 3rd digits of right foot   NEUROLOGIC: awake, alert, oriented x3, good muscle tone in all 4 extremities  HEME/LYMPH: no obvious ecchymosis or petechiae

## 2023-01-17 NOTE — H&P ADULT - PROBLEM SELECTOR PLAN 1
Chronic   - last saw Dr. Wilkerson on 1/12; sent to [facility] for right peroneus brevis tendon detachment with fifth metatarsal base and exostectomy and reattachment of the peroneus brevis tendon, right cuboid plantar exostectomy, right tendon Achilles lengthening, posterior tibialis tendon lengthening, and right fifth digit proximal phalanx base resection.   - Pt to go to OR [facility]   - Chronic   - last saw Dr. Wilkerson on 1/12; sent to [facility] for right peroneus brevis tendon detachment with fifth metatarsal base and exostectomy and reattachment of the peroneus brevis tendon, right cuboid plantar exostectomy, right tendon Achilles lengthening, posterior tibialis tendon lengthening, and right fifth digit proximal phalanx base resection.   - Pt to go to [facility]   - s/p amputation of tips of 2nd and 3rd digits of left foot   - Cardiology, Dr. Russo, consulted for clearance; f/u recs

## 2023-01-17 NOTE — CONSULT NOTE ADULT - SUBJECTIVE AND OBJECTIVE BOX
Patient scheduled for right achilles tendon lengthening, right PT tendon lengthening, and right peroneal tendon detachment and reattachment with 5th metatarsal base and cuboid exostectomy for tomorrow in the operating room.    NOTE IN PROGRESS   HPI:  57-year-old male seen in the emergency room for right foot Charcot deformity with right plantar lateral midfoot ulcer and distal lateral forefoot ulcer, both down to skin, subcutaneous tissue, fat.  Patient is also status post bone biopsies which are negative for osteomyelitis.  Patient is well-known to the [place] [facility] and wound care team.  Patient currently planned for a right Achilles tendon lengthening, right PT tendon lengthening, right peroneal tendon detachment and reattachment with fifth metatarsal base and cuboid exostectomy, right fifth digit proximal phalanx base resection.  Denies any fever, chills, nausea, vomiting, chest pain, shortness of breath, or calf pain at this time.    REVIEW OF SYSTEMS  CONSTITUTIONAL: denies fever, chills, fatigue, weakness  HEENT: denies blurred vision, sore throat  SKIN: denies new lesions, rash  CARDIOVASCULAR: denies chest pain, chest pressure, palpitations  RESPIRATORY: denies shortness of breath, cough, sputum production  GASTROINTESTINAL: denies nausea, vomiting, diarrhea, abdominal pain, melena or hematochezia  GENITOURINARY: denies dysuria, discharge  NEUROLOGICAL: denies numbness, headache, focal weakness  MUSCULOSKELETAL: denies new joint pain, muscle aches  HEMATOLOGIC: denies gross bleeding, bruising    PAST MEDICAL & SURGICAL HISTORY:  Hypertension      Hyperlipidemia      Diabetes mellitus  Type 1 on insulin pump      CKD (chronic kidney disease)  Renal Transplant 2013 at [facility]      Diabetic peripheral neuropathy      Obesity (BMI 30-39.9)      CVA (cerebral vascular accident)  [place]enberg Stroke  low L body sensation  low R face sensation  decreased peripheral vision  poor balance  2015        Squamous cell carcinoma of skin of left ear  nose      History of DVT (deep vein thrombosis)  LLE 2016, was on Eliquis x 6 months  Was hospitalized for Rhinovirus at the time, intubated      Recurrent squamous cell carcinoma of skin  nose, L arm      History of pleural effusion  right thoracentesis 6/2022 and 8/2022      CARMEN treated with BiPAP  2L O2 at night      History of restrictive lung disease      Toe infection  2007 L 4th Toe surgery-amputation secondary to osteomyelitis  2019 partial right 2nd 4th toe amputation      Ear disease  Left inner ear surgery, 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, Can NOT have MRI      Vasectomy status  s/p b/l  vasectomy      H/O foot surgery  2005 - right foot - bone fracture - s/p ORIF and subsequent removal of hardware      End-stage renal failure with renal transplant  12/2013      S/P kidney transplant  2013      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      History of loop recorder  2015          Allergies    No Known Allergies    Intolerances        MEDICATIONS  (STANDING):  aspirin  chewable 81 milliGRAM(s) Oral daily  atorvastatin 10 milliGRAM(s) Oral at bedtime  azaTHIOprine 100 milliGRAM(s) Oral daily  cloNIDine 0.1 milliGRAM(s) Oral two times a day  dextrose 5%. 1000 milliLiter(s) (50 mL/Hr) IV Continuous <Continuous>  dextrose 5%. 1000 milliLiter(s) (100 mL/Hr) IV Continuous <Continuous>  dextrose 50% Injectable 25 Gram(s) IV Push once  dextrose 50% Injectable 12.5 Gram(s) IV Push once  dextrose 50% Injectable 25 Gram(s) IV Push once  gabapentin 300 milliGRAM(s) Oral two times a day  glucagon  Injectable 1 milliGRAM(s) IntraMuscular once  hydrALAZINE 100 milliGRAM(s) Oral three times a day  insulin lispro (HumaLOG) Pump 1 Each SubCutaneous Continuous Pump  metoprolol tartrate 50 milliGRAM(s) Oral two times a day  tacrolimus 1.5 milliGRAM(s) Oral <User Schedule>  trimethoprim   80 mG/sulfamethoxazole 400 mG 1 Tablet(s) Oral daily    MEDICATIONS  (PRN):  acetaminophen     Tablet .. 650 milliGRAM(s) Oral every 6 hours PRN Temp greater or equal to 38C (100.4F), Mild Pain (1 - 3)  dextrose Oral Gel 15 Gram(s) Oral once PRN Blood Glucose LESS THAN 70 milliGRAM(s)/deciliter      Social History:  Tobacco: Never smoker   EtOH:  Socially   Recreational drug use: Never user   Lives with: Wife   Ambulates: independently   ADLs: independently (17 Jan 2023 14:03)      FAMILY HISTORY:  Family history of diabetes mellitus (DM)    FH: skin cancer        Vital Signs Last 24 Hrs  T(C): 36.8 (17 Jan 2023 17:26), Max: 36.8 (17 Jan 2023 17:26)  T(F): 98.2 (17 Jan 2023 17:26), Max: 98.2 (17 Jan 2023 17:26)  HR: 65 (17 Jan 2023 17:26) (64 - 65)  BP: 146/78 (17 Jan 2023 17:26) (121/73 - 146/78)  BP(mean): --  RR: 20 (17 Jan 2023 17:26) (16 - 20)  SpO2: 96% (17 Jan 2023 17:26) (96% - 97%)    Parameters below as of 17 Jan 2023 17:26  Patient On (Oxygen Delivery Method): room air        PHYSICAL EXAM:  Vascular: DP & PT palpable bilaterally, Capillary refill 3 seconds, Digital hair not present bilaterally  Neurological: Light touch sensation not intact bilaterally  Musculoskeletal: 5/5 strength in all quadrants bilaterally, AJ & STJ ROM intact.  Dermatological: Right plantar lateral midfoot and right 5th digit base lateral ulceration noted down to skin, subcutaneous tissue, fat, with granular wound bed, no probe to bone, no periwound erythema, no fluctuance, no malodor, no proximal streaking at this time                          12.6   6.44  )-----------( 235      ( 17 Jan 2023 11:55 )             40.3       01-17    141  |  108  |  28<H>  ----------------------------<  75  4.2   |  30  |  1.30    Ca    9.4      17 Jan 2023 11:55    TPro  8.0  /  Alb  3.6  /  TBili  0.4  /  DBili  x   /  AST  14<L>  /  ALT  16  /  AlkPhos  108  01-17      PT/INR - ( 17 Jan 2023 11:55 )   PT: 12.1 sec;   INR: 1.03 ratio         PTT - ( 17 Jan 2023 11:55 )  PTT:30.2 sec        Imaging: radiographs reveal a charcot deformity with a prominent plantar 5th metatarsal and cuboid. there is a suture anchor noted in the navicular.

## 2023-01-17 NOTE — CONSULT NOTE ADULT - ASSESSMENT
- Patient is not complaining of any cardiac symptoms at this time.  - No clear evidence of acute ischemia, trops negative x 2. Will follow up third set.  - His CKs are flat, suggesting against acute atherosclerotic plaque rupture.  - Biomarker trend is not consistent with plaque rupture but rather demand ischemia. Monitor closely for the development of anginal symptoms or clinical signs of ischemia.   - No acute changes on EKG compared to previous.  - No meaningful evidence of volume overload.  - Previous TTE shows ___.  - BP well controlled, monitor routine hemodynamics.  - Continue ___.  - Monitor and replete lytes, keep K>4, Mg>2.  - Strict I/Os, daily weights.  - Pt has no active ischemia, decompensated heart failure, unstable arrythmia, or severe stenotic valvular disease, and has __ cardiac risk factors. In the setting of low risk ___, he/she is optimized from cardiovascular standpoint to proceed with planned procedure with routine hemodynamic monitoring.   - Pt has no modifiable active cardiac risk factors and in the setting of low risk _____, patient is optimized as best as possible from cardiovascular standpoint to proceed with planned procedure with routine hemodynamic monitoring.  - Other cardiovascular workup will depend on clinical course.  - All other workup per primary team.  - Will continue to follow.             57-year-old male with PMHx of type 1 diabetes, CVA, recurrent right pleural effusion with VATS, renal transplant sent in from wound care for admission for right Charcot foot surgery with Dr. Catherine, cardiology consulted for optimization.    #cardiac optimization  - patient is RCRI Class II risk. He should be considered a moderate risk for a low risk podiatric procedure.  - He has no actively modifiable risk factors. He can perform >4 METS. He is optimized as best as possible from a cardiac standpoint to proceed with the procedure with routine hemodynamic monitoring.  - Patient is not complaining of any cardiac symptoms at this time.  - No acute changes on EKG compared to previous.  - No meaningful evidence of volume overload.  - Previous TTE 7/22 showed normal LV function w/ no valvular disease. Recent stress test with no ischemic changes 8/22.    #HTN / HLD  - BP well controlled, monitor routine hemodynamics.  - Continue home hydralazine, clonidine, losartan, metoprolol  - continue home lovastatin    - Monitor and replete lytes, keep K>4, Mg>2.  - Other cardiovascular workup will depend on clinical course.  - All other workup per primary team.  - Will continue to follow.             57-year-old male with PMHx of type 1 diabetes, CVA, recurrent right pleural effusion with VATS, renal transplant sent in from wound care for admission for right Charcot foot surgery with Dr. Catherine, cardiology consulted for optimization.    #cardiac optimization  - patient is RCRI Class II risk. He should be considered a moderate risk for a low risk podiatric procedure.  - He has no actively modifiable risk factors. He can perform >4 METS. He is optimized as best as possible from a cardiac standpoint to proceed with the procedure with routine hemodynamic monitoring.  - No acute changes on EKG compared to previous.  - No meaningful evidence of volume overload.  - Previous TTE 7/22 showed normal LV function w/ no valvular disease. Recent stress test with no ischemia on 8/2022.    #HTN / HLD  - BP well controlled, monitor routine hemodynamics.  - Continue home hydralazine, clonidine, losartan, metoprolol  - continue home lovastatin    - Monitor and replete lytes, keep K>4, Mg>2.  - Other cardiovascular workup will depend on clinical course.  - All other workup per primary team.  - Will continue to follow.

## 2023-01-17 NOTE — ED PROVIDER NOTE - SKIN, MLM
Skin normal color for race, warm, dry right foot lateral aspect ulcer and plantar aspect wound with yellow purulent drainage nvi no redness

## 2023-01-17 NOTE — H&P ADULT - ATTENDING COMMENTS
57-year-old male with PMHx of type 1 diabetes mellitus, CVA, recurrent right pleural effusion with VATS, renal transplant sent in from wound care for admission for right Charcot foot surgery with Dr. Catherine.     HPI as above.     T(C): 36.5 (01-17-23 @ 11:01), Max: 36.5 (01-17-23 @ 11:01)  HR: 64 (01-17-23 @ 11:01) (64 - 64)  BP: 121/73 (01-17-23 @ 11:01) (121/73 - 121/73)  RR: 16 (01-17-23 @ 11:01) (16 - 16)  SpO2: 97% (01-17-23 @ 11:01) (97% - 97%)  Wt(kg): --    Physical Exam:   GENERAL: well-groomed, well-developed, NAD  HEENT: head NC/AT; , conjunctiva & sclera clear; hearing grossly intact, moist mucous membranes  NECK: supple, no JVD  RESPIRATORY: CTA B/L, no wheezing, rales, rhonchi or rubs  CARDIOVASCULAR: S1&S2, RRR, no murmurs or gallops  ABDOMEN: soft, non-tender, non-distended, + Bowel sounds x4 quadrants, no guarding, rebound or rigidity  MUSCULOSKELETAL:  B/L LE pitting edema 1+, no cyanosis noted. Right foot wound.   LYMPH: no cervical lymphadenopathy  VASCULAR: Radial pulses 2+ bilaterally, no varicose veins   SKIN: warm and dry, color normal, Right foot wound  NEUROLOGIC: AA&O X3, CN2-12 intact w/ no focal deficits, no sensory loss, motor Strength 5/5 in UE & LE B/L  Psych: Normal mood and affect, normal behavior    Plan:   Patient is medically optimized for podiatric surgery if clinically indicated. Patient has no modifiable risk factors at this time. No evidence of decompensated heart failure on exam. EKG reviewed and shows 1st degree AV block, no acute ischemic changes. CARDIAC CLEARANCE is PENDING and DOCUMENTED SEPARATELY.

## 2023-01-17 NOTE — H&P ADULT - NSHPSOCIALHISTORY_GEN_ALL_CORE
Tobacco:   EtOH:    Recreational drug use:   Lives with:   Ambulates:   ADLs: Tobacco: Never smoker   EtOH:  Socially   Recreational drug use: Never user   Lives with: Wife   Ambulates: independently   ADLs: independently

## 2023-01-17 NOTE — ED ADULT NURSE NOTE - CHIEF COMPLAINT QUOTE
Patient is a 58yo male sent by wound care for foot surgery Patient has a right foot non healing ulcer

## 2023-01-17 NOTE — CONSULT NOTE ADULT - SUBJECTIVE AND OBJECTIVE BOX
Patient is a 57y old  Male who presents with a chief complaint of Right Charcot Foot (2023 14:03)      HPI:  57-year-old male with PMHx of type 1 diabetes, CVA, recurrent right pleural effusion with VATS, renal transplant sent in from wound care for admission for right Charcot foot surgery with Dr. Catherine. Patient last seen at wound care with Dr. Catherine on . Patient is status post bone biopsies which are negative for osteomyelitis at this time. Sent into the hospital by Dr. Catherine for right peroneus brevis tendon detachment with fifth metatarsal base and exostectomy and reattachment of the peroneus brevis tendon, right cuboid plantar exostectomy, right tendon Achilles lengthening, posterior tibialis tendon lengthening, and right fifth digit proximal phalanx base resection. Patient with no complaints at this time.     ED course  Vitals: T 97.7, HR 64, /73, RR 16, SpO2 97% on RA  Significant labs: ESR 21, H 12.6, BUN 28   CXR shows rt sided pleural effusion; unchanged from previous  Xray Foot AP pending official read   EKst degree AV block, rate 66 bpm  In ED patient given: Vanc 1g x1, Zosyn 3.375g x1     (2023 14:03)      PAST MEDICAL & SURGICAL HISTORY:  Hypertension      Hyperlipidemia      Diabetes mellitus  Type 1 on insulin pump      CKD (chronic kidney disease)  Renal Transplant  at [facility]      Diabetic peripheral neuropathy      Obesity (BMI 30-39.9)      CVA (cerebral vascular accident)  Wallenberg Stroke  low L body sensation  low R face sensation  decreased peripheral vision  poor balance          Squamous cell carcinoma of skin of left ear  nose      History of DVT (deep vein thrombosis)  LLE , was on Eliquis x 6 months  Was hospitalized for Rhinovirus at the time, intubated      Recurrent squamous cell carcinoma of skin  nose, L arm      History of pleural effusion  right thoracentesis 2022 and 2022      CARMEN treated with BiPAP  2L O2 at night      History of restrictive lung disease      Toe infection   L 4th Toe surgery-amputation secondary to osteomyelitis  2019 partial right 2nd 4th toe amputation      Ear disease  Left inner ear surgery, 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, Can NOT have MRI      Vasectomy status  s/p b/l  vasectomy      H/O foot surgery   - right foot - bone fracture - s/p ORIF and subsequent removal of hardware      End-stage renal failure with renal transplant  2013      S/P kidney transplant  2013      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      History of loop recorder                  ECHO  FINDINGS:      MEDICATIONS  (STANDING):  aspirin  chewable 81 milliGRAM(s) Oral daily  atorvastatin 10 milliGRAM(s) Oral at bedtime  azaTHIOprine 100 milliGRAM(s) Oral daily  cloNIDine 0.1 milliGRAM(s) Oral two times a day  gabapentin 300 milliGRAM(s) Oral two times a day  hydrALAZINE 100 milliGRAM(s) Oral three times a day  metoprolol tartrate 50 milliGRAM(s) Oral two times a day  tacrolimus    0.5 mG/mL Suspension 1.5 milliGRAM(s) Oral <User Schedule>  trimethoprim   80 mG/sulfamethoxazole 400 mG 1 Tablet(s) Oral daily    MEDICATIONS  (PRN):      FAMILY HISTORY:  Family history of diabetes mellitus (DM)    FH: skin cancer      Denies Family history of CAD or early MI      Constitutional: denies fever, chills  HEENT: denies blurry vision, difficulty hearing  Respiratory: denies SOB, WOOTEN, cough  Cardiovascular: denies CP, palpitations, orthopnea, PND, LE edema  Gastrointestinal: denies nausea, vomiting, abdominal pain  Genitourinary: denies urinary changes  Skin: Denies rashes, itching  Neurologic: denies headache, weakness, dizziness  Hematology/Oncology: denies bleeding, easy bruising  ROS negative except as noted above      SOCIAL HISTORY:    No tobacco, Alcohol or Ddrug use    Vital Signs Last 24 Hrs  T(C): 36.5 (2023 11:01), Max: 36.5 (2023 11:01)  T(F): 97.7 (2023 11:01), Max: 97.7 (2023 11:01)  HR: 64 (2023 11:01) (64 - 64)  BP: 121/73 (2023 11:01) (121/73 - 121/73)  RR: 16 (2023 11:01) (16 - 16)  SpO2: 97% (2023 11:01) (97% - 97%)    Parameters below as of 2023 11:01  Patient On (Oxygen Delivery Method): room air        Physical Exam:  General: Well developed, well nourished, NAD  HEENT: NCAT, PERRLA, EOMI bl, moist mucous membranes   Neck: Supple, nontender, no mass  Neurology: A&Ox3, nonfocal, sensation intact   Respiratory: CTA B/L, No W/R/R  CV: RRR, +S1/S2, no murmurs, rubs or gallops  Abdominal: Soft, NT, ND +BSx4, no palpable masses  Extremities: No C/C/E, + peripheral pulses  MSK: Normal ROM, no joint erythema or warmth, no joint swelling   Heme: No obvious ecchymosis or petechiae   Skin: warm, dry, normal color      ECG:    I&O's Detail      LABS:                        12.6   6.44  )-----------( 235      ( 2023 11:55 )             40.3         141  |  108  |  28<H>  ----------------------------<  75  4.2   |  30  |  1.30    Ca    9.4      2023 11:55    TPro  8.0  /  Alb  3.6  /  TBili  0.4  /  DBili  x   /  AST  14<L>  /  ALT  16  /  AlkPhos  108  -        PT/INR - ( 2023 11:55 )   PT: 12.1 sec;   INR: 1.03 ratio         PTT - ( 2023 11:55 )  PTT:30.2 sec    I&O's Summary    BNP  RADIOLOGY & ADDITIONAL STUDIES: Patient is a 57y old  Male who presents with a chief complaint of Right Charcot Foot (2023 14:03)    Interval History:  Patient seen and examined at the bedside. He is in no acute distress. he states he is looking forward to having the surgery tomorrow and has no other complaints at this time. States he is able to climb stairs and walk down the street without significant chest pain or shortness of breath. Has had anesthesia in the past without issue.    HPI:  57-year-old male with PMHx of type 1 diabetes, CVA, recurrent right pleural effusion with VATS, renal transplant sent in from wound care for admission for right Charcot foot surgery with Dr. Catherine. Patient last seen at wound care with Dr. Catherine on . Patient is status post bone biopsies which are negative for osteomyelitis at this time. Sent into the hospital by Dr. Catherine for right peroneus brevis tendon detachment with fifth metatarsal base and exostectomy and reattachment of the peroneus brevis tendon, right cuboid plantar exostectomy, right tendon Achilles lengthening, posterior tibialis tendon lengthening, and right fifth digit proximal phalanx base resection. Patient with no complaints at this time.     ED course  Vitals: T 97.7, HR 64, /73, RR 16, SpO2 97% on RA  Significant labs: ESR 21, H 12.6, BUN 28   CXR shows rt sided pleural effusion; unchanged from previous  Xray Foot AP pending official read   EKst degree AV block, rate 66 bpm  In ED patient given: Vanc 1g x1, Zosyn 3.375g x1     (2023 14:03)      PAST MEDICAL & SURGICAL HISTORY:  Hypertension      Hyperlipidemia      Diabetes mellitus  Type 1 on insulin pump      CKD (chronic kidney disease)  Renal Transplant  at [facility]      Diabetic peripheral neuropathy      Obesity (BMI 30-39.9)      CVA (cerebral vascular accident)  Wallenberg Stroke  low L body sensation  low R face sensation  decreased peripheral vision  poor balance          Squamous cell carcinoma of skin of left ear  nose      History of DVT (deep vein thrombosis)  LLE , was on Eliquis x 6 months  Was hospitalized for Rhinovirus at the time, intubated      Recurrent squamous cell carcinoma of skin  nose, L arm      History of pleural effusion  right thoracentesis 2022 and 2022      CARMEN treated with BiPAP  2L O2 at night      History of restrictive lung disease      Toe infection   L 4th Toe surgery-amputation secondary to osteomyelitis  2019 partial right 2nd 4th toe amputation      Ear disease  Left inner ear surgery, 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, Can NOT have MRI      Vasectomy status  s/p b/l  vasectomy      H/O foot surgery   - right foot - bone fracture - s/p ORIF and subsequent removal of hardware      End-stage renal failure with renal transplant  2013      S/P kidney transplant        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      History of loop recorder                    MEDICATIONS  (STANDING):  aspirin  chewable 81 milliGRAM(s) Oral daily  atorvastatin 10 milliGRAM(s) Oral at bedtime  azaTHIOprine 100 milliGRAM(s) Oral daily  cloNIDine 0.1 milliGRAM(s) Oral two times a day  gabapentin 300 milliGRAM(s) Oral two times a day  hydrALAZINE 100 milliGRAM(s) Oral three times a day  metoprolol tartrate 50 milliGRAM(s) Oral two times a day  tacrolimus    0.5 mG/mL Suspension 1.5 milliGRAM(s) Oral <User Schedule>  trimethoprim   80 mG/sulfamethoxazole 400 mG 1 Tablet(s) Oral daily    MEDICATIONS  (PRN):      FAMILY HISTORY:  Family history of diabetes mellitus (DM)    FH: skin cancer      Denies Family history of CAD or early MI      Constitutional: denies fever, chills  HEENT: denies vision changes  Respiratory: denies SOB, WOOTEN, cough  Cardiovascular: denies CP, palpitations, orthopnea, PND, LE edema  Gastrointestinal: denies nausea, vomiting, abdominal pain  Genitourinary: denies urinary changes  Skin: Denies rashes, itching  Neurologic: denies headache, weakness, dizziness  Hematology/Oncology: denies bleeding, easy bruising  ROS negative except as noted above      SOCIAL HISTORY:    No tobacco, Alcohol or Drug use    Vital Signs Last 24 Hrs  T(C): 36.5 (2023 11:01), Max: 36.5 (2023 11:01)  T(F): 97.7 (2023 11:01), Max: 97.7 (2023 11:01)  HR: 64 (2023 11:01) (64 - 64)  BP: 121/73 (2023 11:01) (121/73 - 121/73)  RR: 16 (2023 11:01) (16 - 16)  SpO2: 97% (2023 11:01) (97% - 97%)    Parameters below as of 2023 11:01  Patient On (Oxygen Delivery Method): room air        Physical Exam:  General: Obese male, NAD  HEENT: NCAT, EOMI bl, moist mucous membranes   Neck: Supple, nontender, no mass  Neurology: A&Ox3, nonfocal, sensation intact   Respiratory: CTA B/L, No W/R/R  CV: RRR, +S1/S2, no murmurs, rubs or gallops  Abdominal: Soft, NT, ND +BSx4, no palpable masses  Extremities: R foot wrapped in bandages C/D/I. Otherwise no C/C/E, + peripheral pulses  MSK: Normal ROM, no joint erythema or warmth, no joint swelling   Heme: No obvious ecchymosis or petechiae   Skin: warm, dry, normal color      ECG: NSR 1st degree av block    I&O's Detail      LABS:                        12.6   6.44  )-----------( 235      ( 2023 11:55 )             40.3     01-17    141  |  108  |  28<H>  ----------------------------<  75  4.2   |  30  |  1.30    Ca    9.4      2023 11:55    TPro  8.0  /  Alb  3.6  /  TBili  0.4  /  DBili  x   /  AST  14<L>  /  ALT  16  /  AlkPhos  108  -        PT/INR - ( 2023 11:55 )   PT: 12.1 sec;   INR: 1.03 ratio         PTT - ( 2023 11:55 )  PTT:30.2 sec    I&O's Summary    BNP  RADIOLOGY & ADDITIONAL STUDIES: Patient is a 57y old  Male who presents with a chief complaint of Right Charcot Foot (2023 14:03)    Interval History:  Patient seen and examined at the bedside. He is in no acute distress. he states he is looking forward to having the surgery tomorrow and has no other complaints at this time. States he is able to climb stairs and walk down the street without significant chest pain or shortness of breath. Has had anesthesia in the past without issue.    HPI:  57-year-old male with PMHx of type 1 diabetes, CVA, recurrent right pleural effusion with VATS, renal transplant sent in from wound care for admission for right Charcot foot surgery with Dr. Catherine. Patient last seen at wound care with Dr. Catherine on . Patient is status post bone biopsies which are negative for osteomyelitis at this time. Sent into the hospital by Dr. Catherine for right peroneus brevis tendon detachment with fifth metatarsal base and exostectomy and reattachment of the peroneus brevis tendon, right cuboid plantar exostectomy, right tendon Achilles lengthening, posterior tibialis tendon lengthening, and right fifth digit proximal phalanx base resection. Patient with no complaints at this time.     ED course  Vitals: T 97.7, HR 64, /73, RR 16, SpO2 97% on RA  Significant labs: ESR 21, H 12.6, BUN 28   CXR shows rt sided pleural effusion; unchanged from previous  Xray Foot AP pending official read   EKst degree AV block, rate 66 bpm  In ED patient given: Vanc 1g x1, Zosyn 3.375g x1     (2023 14:03)      PAST MEDICAL & SURGICAL HISTORY:  Hypertension      Hyperlipidemia      Diabetes mellitus  Type 1 on insulin pump      CKD (chronic kidney disease)  Renal Transplant  at [facility]      Diabetic peripheral neuropathy      Obesity (BMI 30-39.9)      CVA (cerebral vascular accident)  Wallenberg Stroke  low L body sensation  low R face sensation  decreased peripheral vision  poor balance          Squamous cell carcinoma of skin of left ear  nose      History of DVT (deep vein thrombosis)  LLE , was on Eliquis x 6 months  Was hospitalized for Rhinovirus at the time, intubated      Recurrent squamous cell carcinoma of skin  nose, L arm      History of pleural effusion  right thoracentesis 2022 and 2022      CARMEN treated with BiPAP  2L O2 at night      History of restrictive lung disease      Toe infection   L 4th Toe surgery-amputation secondary to osteomyelitis  2019 partial right 2nd 4th toe amputation      Ear disease  Left inner ear surgery, 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, Can NOT have MRI      Vasectomy status  s/p b/l  vasectomy      H/O foot surgery   - right foot - bone fracture - s/p ORIF and subsequent removal of hardware      End-stage renal failure with renal transplant  2013      S/P kidney transplant        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      History of loop recorder                    MEDICATIONS  (STANDING):  aspirin  chewable 81 milliGRAM(s) Oral daily  atorvastatin 10 milliGRAM(s) Oral at bedtime  azaTHIOprine 100 milliGRAM(s) Oral daily  cloNIDine 0.1 milliGRAM(s) Oral two times a day  gabapentin 300 milliGRAM(s) Oral two times a day  hydrALAZINE 100 milliGRAM(s) Oral three times a day  metoprolol tartrate 50 milliGRAM(s) Oral two times a day  tacrolimus    0.5 mG/mL Suspension 1.5 milliGRAM(s) Oral <User Schedule>  trimethoprim   80 mG/sulfamethoxazole 400 mG 1 Tablet(s) Oral daily    MEDICATIONS  (PRN):      FAMILY HISTORY:  Family history of diabetes mellitus (DM)    FH: skin cancer      Denies Family history of CAD or early MI        ROS negative except as noted above      SOCIAL HISTORY:    No tobacco, Alcohol or Drug use    Vital Signs Last 24 Hrs  T(C): 36.5 (2023 11:01), Max: 36.5 (2023 11:01)  T(F): 97.7 (2023 11:01), Max: 97.7 (2023 11:01)  HR: 64 (2023 11:01) (64 - 64)  BP: 121/73 (2023 11:01) (121/73 - 121/73)  RR: 16 (2023 11:01) (16 - 16)  SpO2: 97% (2023 11:01) (97% - 97%)    Parameters below as of 2023 11:01  Patient On (Oxygen Delivery Method): room air        Physical Exam:  General: Obese male, NAD  HEENT: NCAT, EOMI bl, moist mucous membranes   Neck: Supple, nontender, no mass  Neurology: A&Ox3, nonfocal, sensation intact   Respiratory: CTA B/L, No W/R/R  CV: RRR, +S1/S2, no murmurs, rubs or gallops  Abdominal: Soft, NT, ND +BSx4, no palpable masses  Extremities: R foot wrapped in bandages C/D/I. Otherwise no C/C/E, + peripheral pulses  MSK: Normal ROM, no joint erythema or warmth, no joint swelling   Heme: No obvious ecchymosis or petechiae   Skin: warm, dry, normal color      ECG: NSR 1st degree av block    I&O's Detail      LABS:                        12.6   6.44  )-----------( 235      ( 2023 11:55 )             40.3     -    141  |  108  |  28<H>  ----------------------------<  75  4.2   |  30  |  1.30    Ca    9.4      2023 11:55    TPro  8.0  /  Alb  3.6  /  TBili  0.4  /  DBili  x   /  AST  14<L>  /  ALT  16  /  AlkPhos  108  -        PT/INR - ( 2023 11:55 )   PT: 12.1 sec;   INR: 1.03 ratio         PTT - ( 2023 11:55 )  PTT:30.2 sec    I&O's Summary    BNP  RADIOLOGY & ADDITIONAL STUDIES:

## 2023-01-17 NOTE — ED PROVIDER NOTE - CARE PLAN
1 Principal Discharge DX:	Diabetic Charcot foot  Secondary Diagnosis:	Open wound of foot, initial encounter

## 2023-01-17 NOTE — CONSULT NOTE ADULT - ASSESSMENT
Physical Exam:   Vital Signs Last 24 Hrs  T(C): 36.5 (17 Jan 2023 11:01), Max: 36.5 (17 Jan 2023 11:01)  T(F): 97.7 (17 Jan 2023 11:01), Max: 97.7 (17 Jan 2023 11:01)  HR: 64 (17 Jan 2023 11:01) (64 - 64)  BP: 121/73 (17 Jan 2023 11:01) (121/73 - 121/73)  BP(mean): --  RR: 16 (17 Jan 2023 11:01) (16 - 16)  SpO2: 97% (17 Jan 2023 11:01) (97% - 97%)    Parameters below as of 17 Jan 2023 11:01  Patient On (Oxygen Delivery Method): room air             CAPILLARY BLOOD GLUCOSE          Cholesterol, Serum: 113 mg/dL (05.19.21 @ 08:36)     HDL Cholesterol, Serum: 22 mg/dL (05.19.21 @ 08:36)     LDL Cholesterol Calculated: 66 mg/dL (05.19.21 @ 08:36)     DIET: CC  >50%

## 2023-01-18 ENCOUNTER — APPOINTMENT (OUTPATIENT)
Dept: THORACIC SURGERY | Facility: CLINIC | Age: 58
End: 2023-01-18

## 2023-01-18 DIAGNOSIS — J90 PLEURAL EFFUSION, NOT ELSEWHERE CLASSIFIED: ICD-10-CM

## 2023-01-18 LAB
A1C WITH ESTIMATED AVERAGE GLUCOSE RESULT: 7.2 % — HIGH (ref 4–5.6)
ALBUMIN SERPL ELPH-MCNC: 3.2 G/DL — LOW (ref 3.3–5)
ALP SERPL-CCNC: 86 U/L — SIGNIFICANT CHANGE UP (ref 40–120)
ALT FLD-CCNC: 16 U/L — SIGNIFICANT CHANGE UP (ref 12–78)
ANION GAP SERPL CALC-SCNC: 2 MMOL/L — LOW (ref 5–17)
AST SERPL-CCNC: 15 U/L — SIGNIFICANT CHANGE UP (ref 15–37)
BASOPHILS # BLD AUTO: 0.05 K/UL — SIGNIFICANT CHANGE UP (ref 0–0.2)
BASOPHILS NFR BLD AUTO: 0.9 % — SIGNIFICANT CHANGE UP (ref 0–2)
BILIRUB SERPL-MCNC: 0.5 MG/DL — SIGNIFICANT CHANGE UP (ref 0.2–1.2)
BUN SERPL-MCNC: 28 MG/DL — HIGH (ref 7–23)
CALCIUM SERPL-MCNC: 8.9 MG/DL — SIGNIFICANT CHANGE UP (ref 8.5–10.1)
CHLORIDE SERPL-SCNC: 106 MMOL/L — SIGNIFICANT CHANGE UP (ref 96–108)
CO2 SERPL-SCNC: 34 MMOL/L — HIGH (ref 22–31)
CREAT SERPL-MCNC: 1.3 MG/DL — SIGNIFICANT CHANGE UP (ref 0.5–1.3)
CRP SERPL-MCNC: 16 MG/L — HIGH
EGFR: 64 ML/MIN/1.73M2 — SIGNIFICANT CHANGE UP
EOSINOPHIL # BLD AUTO: 0.25 K/UL — SIGNIFICANT CHANGE UP (ref 0–0.5)
EOSINOPHIL NFR BLD AUTO: 4.3 % — SIGNIFICANT CHANGE UP (ref 0–6)
ESTIMATED AVERAGE GLUCOSE: 160 MG/DL — HIGH (ref 68–114)
GLUCOSE SERPL-MCNC: 119 MG/DL — HIGH (ref 70–99)
HCT VFR BLD CALC: 37.2 % — LOW (ref 39–50)
HGB BLD-MCNC: 11.9 G/DL — LOW (ref 13–17)
IMM GRANULOCYTES NFR BLD AUTO: 0.3 % — SIGNIFICANT CHANGE UP (ref 0–0.9)
INR BLD: 1.11 RATIO — SIGNIFICANT CHANGE UP (ref 0.88–1.16)
LYMPHOCYTES # BLD AUTO: 1.02 K/UL — SIGNIFICANT CHANGE UP (ref 1–3.3)
LYMPHOCYTES # BLD AUTO: 17.7 % — SIGNIFICANT CHANGE UP (ref 13–44)
MCHC RBC-ENTMCNC: 28.4 PG — SIGNIFICANT CHANGE UP (ref 27–34)
MCHC RBC-ENTMCNC: 32 GM/DL — SIGNIFICANT CHANGE UP (ref 32–36)
MCV RBC AUTO: 88.8 FL — SIGNIFICANT CHANGE UP (ref 80–100)
MONOCYTES # BLD AUTO: 0.56 K/UL — SIGNIFICANT CHANGE UP (ref 0–0.9)
MONOCYTES NFR BLD AUTO: 9.7 % — SIGNIFICANT CHANGE UP (ref 2–14)
NEUTROPHILS # BLD AUTO: 3.85 K/UL — SIGNIFICANT CHANGE UP (ref 1.8–7.4)
NEUTROPHILS NFR BLD AUTO: 67.1 % — SIGNIFICANT CHANGE UP (ref 43–77)
NRBC # BLD: 0 /100 WBCS — SIGNIFICANT CHANGE UP (ref 0–0)
PLATELET # BLD AUTO: 200 K/UL — SIGNIFICANT CHANGE UP (ref 150–400)
POTASSIUM SERPL-MCNC: 4.3 MMOL/L — SIGNIFICANT CHANGE UP (ref 3.5–5.3)
POTASSIUM SERPL-SCNC: 4.3 MMOL/L — SIGNIFICANT CHANGE UP (ref 3.5–5.3)
PROT SERPL-MCNC: 6.8 G/DL — SIGNIFICANT CHANGE UP (ref 6–8.3)
PROTHROM AB SERPL-ACNC: 13 SEC — SIGNIFICANT CHANGE UP (ref 10.5–13.4)
RBC # BLD: 4.19 M/UL — LOW (ref 4.2–5.8)
RBC # FLD: 14.7 % — HIGH (ref 10.3–14.5)
SODIUM SERPL-SCNC: 142 MMOL/L — SIGNIFICANT CHANGE UP (ref 135–145)
WBC # BLD: 5.75 K/UL — SIGNIFICANT CHANGE UP (ref 3.8–10.5)
WBC # FLD AUTO: 5.75 K/UL — SIGNIFICANT CHANGE UP (ref 3.8–10.5)

## 2023-01-18 PROCEDURE — 28104 REMOVAL OF FOOT LESION: CPT

## 2023-01-18 PROCEDURE — 73630 X-RAY EXAM OF FOOT: CPT | Mod: 26,RT

## 2023-01-18 PROCEDURE — 99233 SBSQ HOSP IP/OBS HIGH 50: CPT | Mod: GC

## 2023-01-18 PROCEDURE — 99232 SBSQ HOSP IP/OBS MODERATE 35: CPT

## 2023-01-18 PROCEDURE — 28122 PARTIAL REMOVAL OF FOOT BONE: CPT | Mod: 59

## 2023-01-18 PROCEDURE — 88311 DECALCIFY TISSUE: CPT | Mod: 26

## 2023-01-18 PROCEDURE — 27686 REVISE LOWER LEG TENDONS: CPT

## 2023-01-18 PROCEDURE — 88304 TISSUE EXAM BY PATHOLOGIST: CPT | Mod: 26

## 2023-01-18 PROCEDURE — 28100 REMOVAL OF ANKLE/HEEL LESION: CPT

## 2023-01-18 DEVICE — ANCHOR ICONIX 2 FORCE FIBER 2.3MM: Type: IMPLANTABLE DEVICE | Status: FUNCTIONAL

## 2023-01-18 RX ORDER — METOPROLOL TARTRATE 50 MG
50 TABLET ORAL
Refills: 0 | Status: DISCONTINUED | OUTPATIENT
Start: 2023-01-18 | End: 2023-01-20

## 2023-01-18 RX ORDER — DEXTROSE 50 % IN WATER 50 %
25 SYRINGE (ML) INTRAVENOUS ONCE
Refills: 0 | Status: DISCONTINUED | OUTPATIENT
Start: 2023-01-18 | End: 2023-01-20

## 2023-01-18 RX ORDER — INSULIN LISPRO 100/ML
1 VIAL (ML) SUBCUTANEOUS
Refills: 0 | Status: DISCONTINUED | OUTPATIENT
Start: 2023-01-18 | End: 2023-01-20

## 2023-01-18 RX ORDER — SODIUM CHLORIDE 9 MG/ML
1000 INJECTION, SOLUTION INTRAVENOUS
Refills: 0 | Status: DISCONTINUED | OUTPATIENT
Start: 2023-01-18 | End: 2023-01-18

## 2023-01-18 RX ORDER — LANOLIN ALCOHOL/MO/W.PET/CERES
5 CREAM (GRAM) TOPICAL AT BEDTIME
Refills: 0 | Status: DISCONTINUED | OUTPATIENT
Start: 2023-01-18 | End: 2023-01-20

## 2023-01-18 RX ORDER — ACETAMINOPHEN 500 MG
650 TABLET ORAL EVERY 6 HOURS
Refills: 0 | Status: DISCONTINUED | OUTPATIENT
Start: 2023-01-18 | End: 2023-01-20

## 2023-01-18 RX ORDER — ONDANSETRON 8 MG/1
4 TABLET, FILM COATED ORAL ONCE
Refills: 0 | Status: DISCONTINUED | OUTPATIENT
Start: 2023-01-18 | End: 2023-01-18

## 2023-01-18 RX ORDER — SODIUM CHLORIDE 9 MG/ML
1000 INJECTION, SOLUTION INTRAVENOUS
Refills: 0 | Status: DISCONTINUED | OUTPATIENT
Start: 2023-01-18 | End: 2023-01-20

## 2023-01-18 RX ORDER — GLUCAGON INJECTION, SOLUTION 0.5 MG/.1ML
1 INJECTION, SOLUTION SUBCUTANEOUS ONCE
Refills: 0 | Status: DISCONTINUED | OUTPATIENT
Start: 2023-01-18 | End: 2023-01-20

## 2023-01-18 RX ORDER — ENOXAPARIN SODIUM 100 MG/ML
40 INJECTION SUBCUTANEOUS EVERY 24 HOURS
Refills: 0 | Status: DISCONTINUED | OUTPATIENT
Start: 2023-01-19 | End: 2023-01-20

## 2023-01-18 RX ORDER — ACETAMINOPHEN 500 MG
1000 TABLET ORAL ONCE
Refills: 0 | Status: DISCONTINUED | OUTPATIENT
Start: 2023-01-18 | End: 2023-01-18

## 2023-01-18 RX ORDER — TACROLIMUS 5 MG/1
1.5 CAPSULE ORAL
Refills: 0 | Status: DISCONTINUED | OUTPATIENT
Start: 2023-01-18 | End: 2023-01-20

## 2023-01-18 RX ORDER — DEXTROSE 50 % IN WATER 50 %
15 SYRINGE (ML) INTRAVENOUS ONCE
Refills: 0 | Status: DISCONTINUED | OUTPATIENT
Start: 2023-01-18 | End: 2023-01-20

## 2023-01-18 RX ORDER — GABAPENTIN 400 MG/1
300 CAPSULE ORAL
Refills: 0 | Status: DISCONTINUED | OUTPATIENT
Start: 2023-01-18 | End: 2023-01-20

## 2023-01-18 RX ORDER — DEXTROSE 50 % IN WATER 50 %
12.5 SYRINGE (ML) INTRAVENOUS ONCE
Refills: 0 | Status: DISCONTINUED | OUTPATIENT
Start: 2023-01-18 | End: 2023-01-20

## 2023-01-18 RX ORDER — DEXTROSE 50 % IN WATER 50 %
12.5 SYRINGE (ML) INTRAVENOUS ONCE
Refills: 0 | Status: COMPLETED | OUTPATIENT
Start: 2023-01-18 | End: 2023-01-18

## 2023-01-18 RX ORDER — ATORVASTATIN CALCIUM 80 MG/1
10 TABLET, FILM COATED ORAL AT BEDTIME
Refills: 0 | Status: DISCONTINUED | OUTPATIENT
Start: 2023-01-18 | End: 2023-01-20

## 2023-01-18 RX ORDER — ASPIRIN/CALCIUM CARB/MAGNESIUM 324 MG
81 TABLET ORAL DAILY
Refills: 0 | Status: DISCONTINUED | OUTPATIENT
Start: 2023-01-18 | End: 2023-01-20

## 2023-01-18 RX ORDER — CEFAZOLIN SODIUM 1 G
2000 VIAL (EA) INJECTION EVERY 8 HOURS
Refills: 0 | Status: DISCONTINUED | OUTPATIENT
Start: 2023-01-18 | End: 2023-01-20

## 2023-01-18 RX ORDER — HYDRALAZINE HCL 50 MG
100 TABLET ORAL THREE TIMES A DAY
Refills: 0 | Status: DISCONTINUED | OUTPATIENT
Start: 2023-01-18 | End: 2023-01-20

## 2023-01-18 RX ORDER — DEXTROSE 50 % IN WATER 50 %
25 SYRINGE (ML) INTRAVENOUS ONCE
Refills: 0 | Status: COMPLETED | OUTPATIENT
Start: 2023-01-18 | End: 2023-01-18

## 2023-01-18 RX ADMIN — Medication 100 MILLIGRAM(S): at 21:35

## 2023-01-18 RX ADMIN — Medication 0.1 MILLIGRAM(S): at 21:34

## 2023-01-18 RX ADMIN — Medication 0.1 MILLIGRAM(S): at 05:35

## 2023-01-18 RX ADMIN — ATORVASTATIN CALCIUM 10 MILLIGRAM(S): 80 TABLET, FILM COATED ORAL at 21:34

## 2023-01-18 RX ADMIN — Medication 12.5 GRAM(S): at 04:39

## 2023-01-18 RX ADMIN — Medication 100 MILLIGRAM(S): at 05:35

## 2023-01-18 RX ADMIN — SODIUM CHLORIDE 75 MILLILITER(S): 9 INJECTION, SOLUTION INTRAVENOUS at 18:56

## 2023-01-18 RX ADMIN — Medication 50 MILLIGRAM(S): at 19:06

## 2023-01-18 RX ADMIN — Medication 50 MILLIGRAM(S): at 05:35

## 2023-01-18 RX ADMIN — Medication 1 EACH: at 22:10

## 2023-01-18 RX ADMIN — SODIUM CHLORIDE 100 MILLILITER(S): 9 INJECTION, SOLUTION INTRAVENOUS at 05:40

## 2023-01-18 RX ADMIN — Medication 100 MILLIGRAM(S): at 21:33

## 2023-01-18 RX ADMIN — GABAPENTIN 300 MILLIGRAM(S): 400 CAPSULE ORAL at 05:35

## 2023-01-18 RX ADMIN — Medication 25 GRAM(S): at 06:01

## 2023-01-18 RX ADMIN — Medication 5 MILLIGRAM(S): at 21:34

## 2023-01-18 RX ADMIN — TACROLIMUS 1.5 MILLIGRAM(S): 5 CAPSULE ORAL at 09:12

## 2023-01-18 RX ADMIN — GABAPENTIN 300 MILLIGRAM(S): 400 CAPSULE ORAL at 18:56

## 2023-01-18 RX ADMIN — TACROLIMUS 1.5 MILLIGRAM(S): 5 CAPSULE ORAL at 21:33

## 2023-01-18 NOTE — CONSULT NOTE ADULT - SUBJECTIVE AND OBJECTIVE BOX
Patient is a 57y old  Male who presents with a chief complaint of Right Charcot Foot (2023 08:51)    HPI:  57-year-old male with PMHx of type 1 diabetes mellitus, CVA, recurrent right pleural effusion with VATS, renal transplant sent in from wound care for admission for right Charcot foot surgery with Dr. Catherine. Patient last seen at wound care with Dr. Catherine on . Patient is status post bone biopsies which are negative for osteomyelitis at this time. Sent into the hospital by Dr. Catherine for right peroneus brevis tendon detachment with fifth metatarsal base and exostectomy and reattachment of the peroneus brevis tendon, right cuboid plantar exostectomy, right tendon Achilles lengthening, posterior tibialis tendon lengthening, and right fifth digit proximal phalanx base resection. Patient with no complaints at this time.     ED course  Vitals: T 97.7, HR 64, /73, RR 16, SpO2 97% on RA  Significant labs: ESR 21, H 12.6, BUN 28   CXR shows rt sided pleural effusion; unchanged from previous  Xray Foot AP pending official read   EKst degree AV block, rate 66 bpm  In ED patient given: Vanc 1g x1, Zosyn 3.375g x1     (2023 14:03)      PAST MEDICAL HISTORY:  Hypertension    Hyperlipidemia    Diabetes mellitus    CKD (chronic kidney disease)    Diabetic peripheral neuropathy    Obesity (BMI 30-39.9)    CVA (cerebral vascular accident)    Squamous cell carcinoma of skin of left ear    History of DVT (deep vein thrombosis)    Recurrent squamous cell carcinoma of skin    History of pleural effusion    CARMEN treated with BiPAP    History of restrictive lung disease        PAST SURGICAL HISTORY:  Toe infection    Ear disease    Vasectomy status    H/O foot surgery    End-stage renal failure with renal transplant    S/P kidney transplant    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    History of loop recorder        FAMILY HISTORY:  Family history of diabetes mellitus (DM)    FH: skin cancer        SOCIAL HISTORY:    Allergies    No Known Allergies    Intolerances      Home Medications:  aspirin 81 mg oral tablet: 1 tab(s) orally once a day (2023 15:04)  azaTHIOprine 100 mg oral tablet: 1 tab(s) orally once a day, am (2023 15:04)  Bactrim 400 mg-80 mg oral tablet: 1 tab(s) orally once a day, am (2023 15:04)  cloNIDine 0.1 mg oral tablet: 1 tab(s) orally 2 times a day (2023 15:04)  gabapentin 300 mg oral capsule: 1 tab(s) orally 2 times a day (2023 15:04)  Humalog pump: 3 unit(s) per hour 24hrs/day with 3 bolus with meals.  (2023 15:04)  hydrALAZINE 100 mg oral tablet: 1 tab(s) orally 3 times a day (2023 15:04)  lovastatin 40 mg oral tablet: 1 tab(s) orally once a day, pm (2023 15:04)  metoprolol tartrate 50 mg oral tablet: 1 tab(s) orally 2 times a day (2023 15:04)  tacrolimus: 1.5 milligram(s) orally 2 times a  (9am, 9pm)- *strictly timed* (2023 15:04)    MEDICATIONS  (STANDING):  aspirin  chewable 81 milliGRAM(s) Oral daily  atorvastatin 10 milliGRAM(s) Oral at bedtime  azaTHIOprine 100 milliGRAM(s) Oral daily  cloNIDine 0.1 milliGRAM(s) Oral two times a day  dextrose 5%. 1000 milliLiter(s) (50 mL/Hr) IV Continuous <Continuous>  dextrose 5%. 1000 milliLiter(s) (100 mL/Hr) IV Continuous <Continuous>  dextrose 5%. 1000 milliLiter(s) (100 mL/Hr) IV Continuous <Continuous>  dextrose 50% Injectable 25 Gram(s) IV Push once  dextrose 50% Injectable 12.5 Gram(s) IV Push once  dextrose 50% Injectable 25 Gram(s) IV Push once  gabapentin 300 milliGRAM(s) Oral two times a day  glucagon  Injectable 1 milliGRAM(s) IntraMuscular once  hydrALAZINE 100 milliGRAM(s) Oral three times a day  insulin lispro (HumaLOG) Pump 1 Each SubCutaneous Continuous Pump  melatonin 5 milliGRAM(s) Oral at bedtime  metoprolol tartrate 50 milliGRAM(s) Oral two times a day  tacrolimus 1.5 milliGRAM(s) Oral <User Schedule>  trimethoprim   80 mG/sulfamethoxazole 400 mG 1 Tablet(s) Oral daily    MEDICATIONS  (PRN):  acetaminophen     Tablet .. 650 milliGRAM(s) Oral every 6 hours PRN Temp greater or equal to 38C (100.4F), Mild Pain (1 - 3)  dextrose Oral Gel 15 Gram(s) Oral once PRN Blood Glucose LESS THAN 70 milliGRAM(s)/deciliter      REVIEW OF SYSTEMS:  General:   Respiratory: No cough, SOB  Cardiovascular: No CP or Palpitations	  Gastrointestinal: No nausea, Vomiting. No diarrhea  Genitourinary: No urinary complaints	  Musculoskeletal: No leg swelling, No new rash or lesions	  Neurological: 	  all other systems negative    T(F): 97.7 (23 @ 06:09), Max: 98.7 (23 @ 21:13)  HR: 64 (23 @ 06:09) (62 - 69)  BP: 136/78 (23 @ 06:09) (121/73 - 167/76)  RR: 19 (23 @ 06:09) (16 - 20)  SpO2: 92% (23 @ 06:09) (92% - 97%)  Wt(kg): --    PHYSICAL EXAM:  General: NAD  Respiratory: b/l air entry  Cardiovascular: S1 S2  Gastrointestinal: soft  Extremities: edema            142  |  106  |  28<H>  ----------------------------<  119<H>  4.3   |  34<H>  |  1.30    Ca    8.9      2023 06:41    TPro  6.8  /  Alb  3.2<L>  /  TBili  0.5  /  DBili  x   /  AST  15  /  ALT  16  /  AlkPhos  86                            11.9   5.75  )-----------( 200      ( 2023 06:41 )             37.2       Potassium, Serum: 4.3 mmol/L ( @ 06:41)  Blood Urea Nitrogen, Serum: 28 mg/dL ( @ 06:41)  Calcium, Total Serum: 8.9 mg/dL ( @ 06:41)  Hemoglobin: 11.9 g/dL ( @ 06:41)      Creatinine, Serum: 1.30 ( @ 06:41)  Creatinine, Serum: 1.30 ( @ 11:55)        LIVER FUNCTIONS - ( 2023 06:41 )  Alb: 3.2 g/dL / Pro: 6.8 g/dL / ALK PHOS: 86 U/L / ALT: 16 U/L / AST: 15 U/L / GGT: x                       I&O's Detail           Patient is a 57y old  Male who presents with a chief complaint of Right Charcot Foot (2023 08:51)    HPI:  57-year-old male with PMHx of type 1 diabetes mellitus, CVA, recurrent right pleural effusion with VATS, renal transplant sent in from wound care for admission for right Charcot foot surgery with Dr. Catherine. Patient last seen at wound care with Dr. Catherine on . Patient is status post bone biopsies which are negative for osteomyelitis at this time. Sent into the hospital by Dr. Catherine for right peroneus brevis tendon detachment with fifth metatarsal base and exostectomy and reattachment of the peroneus brevis tendon, right cuboid plantar exostectomy, right tendon Achilles lengthening, posterior tibialis tendon lengthening, and right fifth digit proximal phalanx base resection. Patient with no complaints at this time.     ED course  Vitals: T 97.7, HR 64, /73, RR 16, SpO2 97% on RA  Significant labs: ESR 21, H 12.6, BUN 28   CXR shows rt sided pleural effusion; unchanged from previous  Xray Foot AP pending official read   EKst degree AV block, rate 66 bpm  In ED patient given: Vanc 1g x1, Zosyn 3.375g x1   (2023 14:03)  h/o Kidney transplant 2013 [facility]  Renal consult called for chronic kidney disease stage, h/o kidney transplant.   h/o Kidney transplant 2013 [facility]      PAST MEDICAL HISTORY:  Hypertension    Hyperlipidemia    Diabetes mellitus    CKD (chronic kidney disease)    Diabetic peripheral neuropathy    Obesity (BMI 30-39.9)    CVA (cerebral vascular accident)    Squamous cell carcinoma of skin of left ear    History of DVT (deep vein thrombosis)    Recurrent squamous cell carcinoma of skin    History of pleural effusion    CARMEN treated with BiPAP    History of restrictive lung disease        PAST SURGICAL HISTORY:  Toe infection    Ear disease    Vasectomy status    H/O foot surgery    End-stage renal failure with renal transplant    S/P kidney transplant    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    History of loop recorder        FAMILY HISTORY:  Family history of diabetes mellitus (DM)    FH: skin cancer        SOCIAL HISTORY: No smoking or alcohol use     Allergies    No Known Allergies    Intolerances      Home Medications:  aspirin 81 mg oral tablet: 1 tab(s) orally once a day (2023 15:04)  azaTHIOprine 100 mg oral tablet: 1 tab(s) orally once a day, am (2023 15:04)  Bactrim 400 mg-80 mg oral tablet: 1 tab(s) orally once a day, am (2023 15:04)  cloNIDine 0.1 mg oral tablet: 1 tab(s) orally 2 times a day (2023 15:04)  gabapentin 300 mg oral capsule: 1 tab(s) orally 2 times a day (2023 15:04)  Humalog pump: 3 unit(s) per hour 24hrs/day with 3 bolus with meals.  (2023 15:04)  hydrALAZINE 100 mg oral tablet: 1 tab(s) orally 3 times a day (2023 15:04)  lovastatin 40 mg oral tablet: 1 tab(s) orally once a day, pm (2023 15:04)  metoprolol tartrate 50 mg oral tablet: 1 tab(s) orally 2 times a day (2023 15:04)  tacrolimus: 1.5 milligram(s) orally 2 times a  (9am, 9pm)- *strictly timed* (2023 15:04)    MEDICATIONS  (STANDING):  aspirin  chewable 81 milliGRAM(s) Oral daily  atorvastatin 10 milliGRAM(s) Oral at bedtime  azaTHIOprine 100 milliGRAM(s) Oral daily  cloNIDine 0.1 milliGRAM(s) Oral two times a day  dextrose 5%. 1000 milliLiter(s) (50 mL/Hr) IV Continuous <Continuous>  dextrose 5%. 1000 milliLiter(s) (100 mL/Hr) IV Continuous <Continuous>  dextrose 5%. 1000 milliLiter(s) (100 mL/Hr) IV Continuous <Continuous>  dextrose 50% Injectable 25 Gram(s) IV Push once  dextrose 50% Injectable 12.5 Gram(s) IV Push once  dextrose 50% Injectable 25 Gram(s) IV Push once  gabapentin 300 milliGRAM(s) Oral two times a day  glucagon  Injectable 1 milliGRAM(s) IntraMuscular once  hydrALAZINE 100 milliGRAM(s) Oral three times a day  insulin lispro (HumaLOG) Pump 1 Each SubCutaneous Continuous Pump  melatonin 5 milliGRAM(s) Oral at bedtime  metoprolol tartrate 50 milliGRAM(s) Oral two times a day  tacrolimus 1.5 milliGRAM(s) Oral <User Schedule>  trimethoprim   80 mG/sulfamethoxazole 400 mG 1 Tablet(s) Oral daily    MEDICATIONS  (PRN):  acetaminophen     Tablet .. 650 milliGRAM(s) Oral every 6 hours PRN Temp greater or equal to 38C (100.4F), Mild Pain (1 - 3)  dextrose Oral Gel 15 Gram(s) Oral once PRN Blood Glucose LESS THAN 70 milliGRAM(s)/deciliter      REVIEW OF SYSTEMS:  General: no distress  Respiratory: No cough, SOB  Cardiovascular: No CP or Palpitations	  Gastrointestinal: No nausea, Vomiting. No diarrhea  Genitourinary: No urinary complaints	  Musculoskeletal: No new rash or lesions	  all other systems negative    T(F): 97.7 (23 @ 06:09), Max: 98.7 (23 @ 21:13)  HR: 64 (23 @ 06:09) (62 - 69)  BP: 136/78 (23 @ 06:09) (121/73 - 167/76)  RR: 19 (23 @ 06:09) (16 - 20)  SpO2: 92% (23 @ 06:09) (92% - 97%)  Wt(kg): --    PHYSICAL EXAM:  General: NAD  Respiratory: b/l air entry  Cardiovascular: S1 S2  Gastrointestinal: soft  Extremities: no edema, foot dressing            142  |  106  |  28<H>  ----------------------------<  119<H>  4.3   |  34<H>  |  1.30    Ca    8.9      2023 06:41    TPro  6.8  /  Alb  3.2<L>  /  TBili  0.5  /  DBili  x   /  AST  15  /  ALT  16  /  AlkPhos  86                            11.9   5.75  )-----------( 200      ( 2023 06:41 )             37.2       Potassium, Serum: 4.3 mmol/L ( @ 06:41)  Blood Urea Nitrogen, Serum: 28 mg/dL ( @ 06:41)  Calcium, Total Serum: 8.9 mg/dL ( @ 06:41)  Hemoglobin: 11.9 g/dL ( @ 06:41)      Creatinine, Serum: 1.30 ( @ 06:41)  Creatinine, Serum: 1.30 ( @ 11:55)        LIVER FUNCTIONS - ( 2023 06:41 )  Alb: 3.2 g/dL / Pro: 6.8 g/dL / ALK PHOS: 86 U/L / ALT: 16 U/L / AST: 15 U/L / GGT: x                 < from: Xray Chest 1 View AP/PA (23 @ 12:55) >    ACC: [id number] EXAM:  XR CHEST AP OR PA 1V                          PROCEDURE DATE:  2023          INTERPRETATION:  Frontal chest on 2023 at 12:54 PM. 2 images.   Patient has wounds of the foot.    Elevated right hemidiaphragm noted. Heart magnified by technique.    On 2022 there was a right based process which has largely   cleared.    No definite acute process at this time.    IMPRESSION: No definite acute process at this time.    --- End of Report ---            YURIY JONES MD; Attending Radiologist  This document has been electronically signed. 2023 10:10AM    < end of copied text >

## 2023-01-18 NOTE — PROGRESS NOTE ADULT - ASSESSMENT
57-year-old male with PMHx of type 1 diabetes, CVA, recurrent right pleural effusion with VATS, renal transplant sent in from wound care for admission for right Charcot foot surgery with Dr. Catherine, cardiology consulted for optimization.    IN Progress    - He has no actively modifiable risk factors. He can perform >4 METS. He is optimized as best as possible from a cardiac standpoint to proceed with the procedure with routine hemodynamic monitoring.  - No acute changes on EKG compared to previous.  - No meaningful evidence of volume overload.  - Previous TTE 7/22 showed normal LV function w/ no valvular disease. Recent stress test with no ischemia on 8/2022.      - BP well controlled, 136/78   - Continue home hydralazine, clonidine, losartan, metoprolol, statin     - Monitor and replete lytes, keep K>4, Mg>2.  - Other cardiovascular workup will depend on clinical course.  - All other workup per primary team.  - Will continue to follow.    Jennifer Ross FNP-C  Cardiology NP  [id number] [phone]           57-year-old male with PMHx of type 1 diabetes, CVA, recurrent right pleural effusion with VATS, renal transplant sent in from wound care for admission for right Charcot foot surgery with Dr. Catherine, cardiology consulted for optimization.      Cardiac Optimization ,   - He has no actively modifiable risk factors.  He is optimized as best as possible from a cardiac standpoint to proceed with the procedure with routine hemodynamic monitoring. fu podiatry recommendations   - No acute changes on EKG compared to previous.  - No meaningful evidence of volume overload.  - Previous TTE 7/22 showed normal LV function w/ no valvular disease. Recent stress test with no ischemia on 8/2022.  - BP well controlled, 136/78   - Continue home hydralazine, clonidine,  metoprolol, statin ASA    - Monitor and replete lytes, keep K>4, Mg>2.  - Other cardiovascular workup will depend on clinical course.  Jennifer Ross FNP-C  Cardiology NP  [facility] [phone]

## 2023-01-18 NOTE — BRIEF OPERATIVE NOTE - NSICDXBRIEFPROCEDURE_GEN_ALL_CORE_FT
PROCEDURES:  Lengthening, Achilles tendon 18-Jan-2023 18:19:55  Donte Catherine  Posterior tibial tendon lengthening 18-Jan-2023 18:20:11  Donte Catherine  Repair, tendon, peroneal 18-Jan-2023 18:20:59  Donte Catherine  Exostectomy, foot 18-Jan-2023 18:21:09  Donte Catherine

## 2023-01-18 NOTE — PROGRESS NOTE ADULT - PROBLEM SELECTOR PLAN 1
Chronic, s/p amputation of tips of 2nd and 3rd digits of left foot   - Patient to go to OR today for exostectomy and tendon lengthening   - Pain control PRN   - Wound care per podiatry recs   - Podiatry following Chronic, s/p amputation of tips of 2nd and 3rd digits of left foot   - Patient to go to OR today for exostectomy and tendon lengthening   - F/u surgical pathology   - Pain control PRN   - Wound care per podiatry recs   - Podiatry following Chronic, s/p amputation of tips of 2nd and 3rd digits of left foot   - Patient to go to OR today for exostectomy and tendon lengthening   - no absolute contraindications to proposed surgical intervention  - F/u surgical pathology   - Pain control PRN   - Wound care per podiatry recs   - Podiatry following

## 2023-01-18 NOTE — CARE COORDINATION ASSESSMENT. - NSCAREPROVIDERS_GEN_ALL_CORE_FT
CARE PROVIDERS:  Accepting Physician: Kristian Castro  Administration: Terence Ray  Administration: Eliezer Burns  Admitting: Kristian Castro  Attending: Kristian Castro  Case Management: Shaylee Timmons  Consultant: Nirmal Bryant  Consultant: José Kelly  Consultant: Kana Russo  Consultant: Miracle Fowler  Consultant: Weil, Patricia  Consultant: Jennifer Ross  Consultant: Donte Catherine  Consultant: Perry Locke  Covering Team: Kai Mitchell  ED ACP: Michael Peters  ED Attending: Ivonne Bradley ED Nurse: Ishan Franco  Nurse: Nadira Awan  Nurse: Em Flynn  Nurse: Tessie Jeronimo  Nurse: Gaby Chisholm  Nurse: Ivonne Lino  Ordered: ADM, User  Ordered: Doctor, Unknown  Ordered: ServiceAccount, SCMMLM  Ordered: ServiceAccount, SCMMLM  Outpatient Provider: Charly Triana  Outpatient Provider: Charly Triana  Outpatient Provider: Nirmal Bryant  Outpatient Provider: Vinayak Gill  Outpatient Provider: José Kelly  Override: Jaime Traylor  Override: Denisse Steinberg  PCA/Nursing Assistant: Demi Deras  PCA/Nursing Assistant: Betty Matthews  Primary Team: Andrea Wright  Primary Team: Hong Rust  Primary Team: Ishan Pang  Primary Team: Kristian Castro  Primary Team: Jasbir Castaneda  Primary Team: Natasha Castro  Primary Team: Marko Mcmullen  Primary Team: Ranjan Strange  Primary Team: Luís Hare  Primary Team: Livier Mcclure  Registered Dietitian: Coty Durand  : Lu Alaniz

## 2023-01-18 NOTE — PROGRESS NOTE ADULT - SUBJECTIVE AND OBJECTIVE BOX
Patient is a 57y old  Male who presents with a chief complaint of Right Charcot Foot (2023 09:26)    update: Pt had insulin pump basal rate @ 35%, receiving 1.1 units/hr, receiving dextrose 5% @ 100ml/hr IVF, last f/s was 87, pt denies s/s of hypoglycemia, discussed with patient to suspend basal insulin, RN will recheck F/S in 2 hours, c/w dextrose IVF.     HPI:  57-year-old male with PMHx of type 1 diabetes mellitus, CVA, recurrent right pleural effusion with VATS, renal transplant sent in from wound care for admission for right Charcot foot surgery with Dr. Catherine. Patient last seen at wound care with Dr. Catherine on . Patient is status post bone biopsies which are negative for osteomyelitis at this time. Sent into the hospital by Dr. Catherine for right peroneus brevis tendon detachment with fifth metatarsal base and exostectomy and reattachment of the peroneus brevis tendon, right cuboid plantar exostectomy, right tendon Achilles lengthening, posterior tibialis tendon lengthening, and right fifth digit proximal phalanx base resection. Patient with no complaints at this time.     ED course  Vitals: T 97.7, HR 64, /73, RR 16, SpO2 97% on RA  Significant labs: ESR 21, H 12.6, BUN 28   CXR shows rt sided pleural effusion; unchanged from previous  Xray Foot AP pending official read   EKst degree AV block, rate 66 bpm  In ED patient given: Vanc 1g x1, Zosyn 3.375g x1     (2023 14:03)      PAST MEDICAL & SURGICAL HISTORY:  Hypertension      Hyperlipidemia      Diabetes mellitus  Type 1 on insulin pump      CKD (chronic kidney disease)  Renal Transplant  at [facility]      Diabetic peripheral neuropathy      Obesity (BMI 30-39.9)      CVA (cerebral vascular accident)  Wallenberg Stroke  low L body sensation  low R face sensation  decreased peripheral vision  poor balance          Squamous cell carcinoma of skin of left ear  nose      History of DVT (deep vein thrombosis)  LLE , was on Eliquis x 6 months  Was hospitalized for Rhinovirus at the time, intubated      Recurrent squamous cell carcinoma of skin  nose, L arm      History of pleural effusion  right thoracentesis 2022 and 2022      CARMEN treated with BiPAP  2L O2 at night      History of restrictive lung disease      Toe infection   L 4th Toe surgery-amputation secondary to osteomyelitis   partial right 2nd 4th toe amputation      Ear disease  Left inner ear surgery, 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, Can NOT have MRI      Vasectomy status  s/p b/l  vasectomy      H/O foot surgery   - right foot - bone fracture - s/p ORIF and subsequent removal of hardware      End-stage renal failure with renal transplant  2013      S/P kidney transplant        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      History of loop recorder        Allergies    No Known Allergies    Intolerances        MEDICATIONS  (STANDING):  aspirin  chewable 81 milliGRAM(s) Oral daily  atorvastatin 10 milliGRAM(s) Oral at bedtime  azaTHIOprine 100 milliGRAM(s) Oral daily  cloNIDine 0.1 milliGRAM(s) Oral two times a day  dextrose 5%. 1000 milliLiter(s) (50 mL/Hr) IV Continuous <Continuous>  dextrose 5%. 1000 milliLiter(s) (100 mL/Hr) IV Continuous <Continuous>  dextrose 5%. 1000 milliLiter(s) (100 mL/Hr) IV Continuous <Continuous>  dextrose 50% Injectable 25 Gram(s) IV Push once  dextrose 50% Injectable 12.5 Gram(s) IV Push once  dextrose 50% Injectable 25 Gram(s) IV Push once  gabapentin 300 milliGRAM(s) Oral two times a day  glucagon  Injectable 1 milliGRAM(s) IntraMuscular once  hydrALAZINE 100 milliGRAM(s) Oral three times a day  insulin lispro (HumaLOG) Pump 1 Each SubCutaneous Continuous Pump  melatonin 5 milliGRAM(s) Oral at bedtime  metoprolol tartrate 50 milliGRAM(s) Oral two times a day  tacrolimus 1.5 milliGRAM(s) Oral <User Schedule>  trimethoprim   80 mG/sulfamethoxazole 400 mG 1 Tablet(s) Oral daily

## 2023-01-18 NOTE — PROVIDER CONTACT NOTE (HYPOGLYCEMIA EVENT) - NS PROVIDER CONTACT BACKGROUND-HYPO
Age: 57y    Gender: Male    POCT Blood Glucose:  154 mg/dL (01-18-23 @ 06:26)  87 mg/dL (01-18-23 @ 05:47)  78 mg/dL (01-18-23 @ 05:37)  99 mg/dL (01-18-23 @ 04:54)  54 mg/dL (01-18-23 @ 04:29)  131 mg/dL (01-17-23 @ 22:26)  106 mg/dL (01-17-23 @ 17:30)      eMAR:atorvastatin   10 milliGRAM(s) Oral (01-17-23 @ 21:20)    dextrose 50% Injectable   25 Gram(s) IV Push (01-18-23 @ 06:01)    dextrose 50% Injectable   12.5 Gram(s) IV Push (01-18-23 @ 04:39)

## 2023-01-18 NOTE — PROGRESS NOTE ADULT - PROBLEM SELECTOR PLAN 7
One time dose of lovenox tonight  - AC to be re-addressed tmmrw after procedure Lovenox given on 1/17/23 but now on hold until post-op (once cleared by podiatry)

## 2023-01-18 NOTE — CARE COORDINATION ASSESSMENT. - PRO ARRIVE FROM
Pt drove himself to the ED and has his car keys with him.  Security was called and updated on the kind of car he has and updated that he is a patient here right now.  They said his car would be okay.  Wife was updated and keys are bedside with patient.    home

## 2023-01-18 NOTE — PHYSICAL THERAPY INITIAL EVALUATION ADULT - PERTINENT HX OF CURRENT PROBLEM, REHAB EVAL
Patient is 57-year-old male with type 1 diabetes CVA recurrent right pleural effusion with VATS renal transplant sent in from wound clinic for admission for right foot Charcot toe.  Patient is scheduled for or for shaving of bone and tendon repair.  Patient states that his wounds about for 1 year and has had drainage which is worsening.

## 2023-01-18 NOTE — PROGRESS NOTE ADULT - PROBLEM SELECTOR PLAN 3
Chronic   - pt s/p VATS procedure  -outpatient management Chronic   - pt s/p VATS procedure  - outpatient management

## 2023-01-18 NOTE — CONSULT NOTE ADULT - ASSESSMENT
CKD 3, h/o Kidney transplant 2013 [facility]  Diabetes, Diabetic foot ulcer, Charcots foot  Hypertension    Stable renal function. To continue current meds. Continue prograf and azathioprine. Check prograf levels. Monitor blood sugar levels. Insulin coverage as needed.   Monitor BP trend. Titrate BP meds as needed. Salt restriction. Podiatry follow up. Will follow electrolytes and renal function trend.     Further recommendations pending clinical course. Thank you for the courtesy of this referral.

## 2023-01-18 NOTE — CASE MANAGEMENT PROGRESS NOTE - NSCMPROGRESSNOTE_GEN_ALL_CORE
Met with patient at the bedside. Explained the role of case management/discharge planning. Patient verbalized understanding. Provided contact information and discharge packet. Patient resides with his spouse and children and was independent PTA. Patient sent to hospital by the wound care center for Charcot foot surgery. Discharge plan is for patient to be discharged to his parents home [address] [facility] in [place]. Anticipated discharge needs for home PT.  PT will determine if PT needed. Will remain available to patient and family throughout hospital stay

## 2023-01-18 NOTE — PROGRESS NOTE ADULT - PROBLEM SELECTOR PLAN 1
Type 1 holding room awaiting OR.  Recommend endocrine-[facility] on consult  FU appt: to be discussed  DSC recommendations: return to home regimen and glucose monitoring  diabetes education provided  Diabetes support info and cell # [phone] given   Goal 100-180 mg/dL; 140-180 mg/dL in critical care areas

## 2023-01-18 NOTE — PROGRESS NOTE ADULT - SUBJECTIVE AND OBJECTIVE BOX
SW patient at bedside. States had a CGM alarm for <70 mg/dL around 4am- called RN- Accu-Chek obtained and confirmed the CGM. Patient states forgot to lower basal rate at 12M as discussed in ED. states suspended pump at that time (4am). Now BG >150 mg/dL with dextrose 5% at 100 hr and advised to resume pump at reduced 50%. Basal rate 3 units/hr recommended 1.5 units/hr- patient preferred 1 unit/hr basal rate. Will continue to monitor BG POCT- patient will continue to monitor with CGM.

## 2023-01-18 NOTE — CONSULT NOTE ADULT - SUBJECTIVE AND OBJECTIVE BOX
Patient is a 57y old  Male who presents with a chief complaint of Right Charcot Foot (2023 12:24)      Reason For Consult: dm1 uncontrolled    HPI:  57-year-old male with PMHx of type 1 diabetes mellitus, CVA, recurrent right pleural effusion with VATS, renal transplant sent in from wound care for admission for right Charcot foot surgery with Dr. Catherine. Patient last seen at wound care with Dr. Catherine on . Patient is status post bone biopsies which are negative for osteomyelitis at this time. Sent into the hospital by Dr. Catherine for right peroneus brevis tendon detachment with fifth metatarsal base and exostectomy and reattachment of the peroneus brevis tendon, right cuboid plantar exostectomy, right tendon Achilles lengthening, posterior tibialis tendon lengthening, and right fifth digit proximal phalanx base resection. Patient with no complaints at this time.     ED course  Vitals: T 97.7, HR 64, /73, RR 16, SpO2 97% on RA  Significant labs: ESR 21, H 12.6, BUN 28   CXR shows rt sided pleural effusion; unchanged from previous  Xray Foot AP pending official read   EKst degree AV block, rate 66 bpm  In ED patient given: Vanc 1g x1, Zosyn 3.375g x1     (2023 14:03)      PAST MEDICAL & SURGICAL HISTORY:  Hypertension      Hyperlipidemia      Diabetes mellitus  Type 1 on insulin pump      CKD (chronic kidney disease)  Renal Transplant 2013 at [facility]      Diabetic peripheral neuropathy      Obesity (BMI 30-39.9)      CVA (cerebral vascular accident)  Wallenberg Stroke  low L body sensation  low R face sensation  decreased peripheral vision  poor balance          Squamous cell carcinoma of skin of left ear  nose      History of DVT (deep vein thrombosis)  LLE , was on Eliquis x 6 months  Was hospitalized for Rhinovirus at the time, intubated      Recurrent squamous cell carcinoma of skin  nose, L arm      History of pleural effusion  right thoracentesis 2022 and 2022      CARMEN treated with BiPAP  2L O2 at night      History of restrictive lung disease      Toe infection   L 4th Toe surgery-amputation secondary to osteomyelitis  2019 partial right 2nd 4th toe amputation      Ear disease  Left inner ear surgery, 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, Can NOT have MRI      Vasectomy status  s/p b/l  vasectomy      H/O foot surgery   - right foot - bone fracture - s/p ORIF and subsequent removal of hardware      End-stage renal failure with renal transplant  2013      S/P kidney transplant  2013      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      History of loop recorder            FAMILY HISTORY:  Family history of diabetes mellitus (DM)    FH: skin cancer          Social History:    MEDICATIONS  (STANDING):  aspirin  chewable 81 milliGRAM(s) Oral daily  atorvastatin 10 milliGRAM(s) Oral at bedtime  azaTHIOprine 100 milliGRAM(s) Oral daily  cloNIDine 0.1 milliGRAM(s) Oral two times a day  dextrose 5%. 1000 milliLiter(s) (50 mL/Hr) IV Continuous <Continuous>  dextrose 5%. 1000 milliLiter(s) (100 mL/Hr) IV Continuous <Continuous>  dextrose 5%. 1000 milliLiter(s) (100 mL/Hr) IV Continuous <Continuous>  dextrose 50% Injectable 25 Gram(s) IV Push once  dextrose 50% Injectable 12.5 Gram(s) IV Push once  dextrose 50% Injectable 25 Gram(s) IV Push once  gabapentin 300 milliGRAM(s) Oral two times a day  glucagon  Injectable 1 milliGRAM(s) IntraMuscular once  hydrALAZINE 100 milliGRAM(s) Oral three times a day  insulin lispro (HumaLOG) Pump 1 Each SubCutaneous Continuous Pump  melatonin 5 milliGRAM(s) Oral at bedtime  metoprolol tartrate 50 milliGRAM(s) Oral two times a day  tacrolimus 1.5 milliGRAM(s) Oral <User Schedule>  trimethoprim   80 mG/sulfamethoxazole 400 mG 1 Tablet(s) Oral daily    MEDICATIONS  (PRN):  acetaminophen     Tablet .. 650 milliGRAM(s) Oral every 6 hours PRN Temp greater or equal to 38C (100.4F), Mild Pain (1 - 3)  dextrose Oral Gel 15 Gram(s) Oral once PRN Blood Glucose LESS THAN 70 milliGRAM(s)/deciliter        T(C): 37.1 (23 @ 11:12), Max: 37.1 (23 @ 21:13)  HR: 63 (23 @ 11:12) (62 - 69)  BP: 162/75 (23 @ 11:12) (136/78 - 167/76)  RR: 18 (23 @ 11:12) (18 - 20)  SpO2: 95% (23 @ 11:12) (92% - 96%)  Wt(kg): --    PHYSICAL EXAM:  GENERAL: NAD, well-groomed, well-developed  HEAD:  Atraumatic, Normocephalic  NECK: Supple, No JVD, Normal thyroid  CHEST/LUNG: Clear to percussion bilaterally; No rales, rhonchi, wheezing, or rubs  HEART: Regular rate and rhythm; No murmurs, rubs, or gallops  ABDOMEN: Soft, Nontender, Nondistended; Bowel sounds present  EXTREMITIES: right foot dsg intact    CAPILLARY BLOOD GLUCOSE      POCT Blood Glucose.: 118 mg/dL (2023 14:35)  POCT Blood Glucose.: 91 mg/dL (2023 12:35)  POCT Blood Glucose.: 87 mg/dL (2023 10:33)  POCT Blood Glucose.: 104 mg/dL (2023 07:48)  POCT Blood Glucose.: 154 mg/dL (2023 06:26)  POCT Blood Glucose.: 87 mg/dL (2023 05:47)  POCT Blood Glucose.: 78 mg/dL (2023 05:37)  POCT Blood Glucose.: 99 mg/dL (2023 04:54)  POCT Blood Glucose.: 54 mg/dL (2023 04:29)  POCT Blood Glucose.: 131 mg/dL (2023 22:26)  POCT Blood Glucose.: 106 mg/dL (2023 17:30)                            11.9   5.75  )-----------( 200      ( 2023 06:41 )             37.2       CMP:   @ 06:41  SGPT 16  Albumin 3.2   Alk Phos 86   Anion Gap 2   SGOT 15   Total Bili 0.5   BUN 28   Calcium Total 8.9   CO2 34   Chloride 106   Creatinine 1.30   eGFR if AA --   eGFR if non AA --   Glucose 119   Potassium 4.3   Protein 6.8   Sodium 142      Thyroid Function Tests:      Diabetes Tests:       Radiology:

## 2023-01-18 NOTE — PROGRESS NOTE ADULT - SUBJECTIVE AND OBJECTIVE BOX
[facility] -- Luly Egan Pannella, Patel, Savella [facility]  Office # [phone]      Follow Up:  cardiac optimization     Subjective/Observations:       REVIEW OF SYSTEMS: All other review of systems is negative unless indicated above    PAST MEDICAL & SURGICAL HISTORY:  Hypertension      Hyperlipidemia      Diabetes mellitus  Type 1 on insulin pump      CKD (chronic kidney disease)  Renal Transplant  at [facility]      Diabetic peripheral neuropathy      Obesity (BMI 30-39.9)      CVA (cerebral vascular accident)  Wallenberg Stroke  low L body sensation  low R face sensation  decreased peripheral vision  poor balance          Squamous cell carcinoma of skin of left ear  nose      History of DVT (deep vein thrombosis)  LLE , was on Eliquis x 6 months  Was hospitalized for Rhinovirus at the time, intubated      Recurrent squamous cell carcinoma of skin  nose, L arm      History of pleural effusion  right thoracentesis 2022 and 2022      CARMEN treated with BiPAP  2L O2 at night      History of restrictive lung disease      Toe infection   L 4th Toe surgery-amputation secondary to osteomyelitis   partial right 2nd 4th toe amputation      Ear disease  Left inner ear surgery, 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 1000 milliLiter(s) (50 mL/Hr) IV Continuous <Continuous>  dextrose 5%. 1000 milliLiter(s) (100 mL/Hr) IV Continuous <Continuous>  dextrose 5%. 1000 milliLiter(s) (100 mL/Hr) IV Continuous <Continuous>  dextrose 50% Injectable 25 Gram(s) IV Push once  dextrose 50% Injectable 12.5 Gram(s) IV Push once  dextrose 50% Injectable 25 Gram(s) IV Push once  gabapentin 300 milliGRAM(s) Oral two times a day  glucagon  Injectable 1 milliGRAM(s) IntraMuscular once  hydrALAZINE 100 milliGRAM(s) Oral three times a day  insulin lispro (HumaLOG) Pump 1 Each SubCutaneous Continuous Pump  melatonin 5 milliGRAM(s) Oral at bedtime  metoprolol tartrate 50 milliGRAM(s) Oral two times a day  tacrolimus 1.5 milliGRAM(s) Oral <User Schedule>  trimethoprim   80 mG/sulfamethoxazole 400 mG 1 Tablet(s) Oral daily    MEDICATIONS  (PRN):  acetaminophen     Tablet .. 650 milliGRAM(s) Oral every 6 hours PRN Temp greater or equal to 38C (100.4F), Mild Pain (1 - 3)  dextrose Oral Gel 15 Gram(s) Oral once PRN Blood Glucose LESS THAN 70 milliGRAM(s)/deciliter      Allergies    No Known Allergies    Intolerances        Vital Signs Last 24 Hrs  T(C): 36.5 (2023 06:09), Max: 37.1 (2023 21:13)  T(F): 97.7 (2023 06:09), Max: 98.7 (2023 21:13)  HR: 64 (2023 06:09) (62 - 69)  BP: 136/78 (2023 06:09) (121/73 - 167/76)  BP(mean): --  RR: 19 (2023 06:09) (16 - 20)  SpO2: 92% (2023 06:09) (92% - 97%)    Parameters below as of 2023 06:09  Patient On (Oxygen Delivery Method): room air        I&O's Summary    Weight (kg): 142.9 (-17 @ 11:01)    PHYSICAL EXAM:  TELE: not on tele   Constitutional: NAD, awake and alert, well-developed  HEENT: Moist Mucous Membranes, Anicteric  Pulmonary: Non-labored, breath sounds are clear bilaterally, No wheezing, crackles or rhonchi  Cardiovascular: Regular, S1 and S2 nl, No murmurs, rubs, gallops or clicks  Gastrointestinal: Bowel Sounds present, soft, nontender.   Lymph: No lymphadenopathy. No peripheral edema.  Skin: No visible rashes or ulcers.  Psych:  Mood & affect appropriate    LABS: All Labs Reviewed:                        11.9   5.75  )-----------( 200      ( 2023 06:41 )             37.2                         12.6   6.44  )-----------( 235      ( 2023 11:55 )             40.3     2023 06:41    142    |  106    |  28     ----------------------------<  119    4.3     |  34     |  1.30   2023 11:55    141    |  108    |  28     ----------------------------<  75     4.2     |  30     |  1.30     Ca    8.9        2023 06:41  Ca    9.4        2023 11:55    TPro  6.8    /  Alb  3.2    /  TBili  0.5    /  DBili  x      /  AST  15     /  ALT  16     /  AlkPhos  86     2023 06:41  TPro  8.0    /  Alb  3.6    /  TBili  0.4    /  DBili  x      /  AST  14     /  ALT  16     /  AlkPhos  108    2023 11:55    PT/INR - ( 2023 06:41 )   PT: 13.0 sec;   INR: 1.11 ratio         PTT - ( 2023 11:55 )  PTT:30.2 sec         EC Lead ECG:   Ventricular Rate 66 BPM    Atrial Rate 66 BPM    P-R Interval 244 ms    QRS Duration 86 ms    Q-T Interval 384 ms    QTC Calculation(Bazett) 402 ms    P Axis 10 degrees    R Axis -6 degrees    T Axis 16 degrees    Diagnosis Line Sinus rhythm with 1st degree AV block  Otherwise normal ECG  When compared with ECG of 12-AUG-2022 10:39,  aberrant conduction is no longer present  QT has shortened  Confirmed by JEAN-PIERRE LEIVA ([id number]) on 2023 6:56:01 PM (23 @ 11:48)      Patient name: LUTHER NORIEGA  YOB: 1965   Age: 51 (M)   MR#: [id number]  Study Date: 2017  Location: O/PSonographer: Susie Zuleta RDCS  Study quality: Technically difficult  Referring Physician: Kashmir Ochoa MD  Blood Pressure: 150/77 mmHg  Height: 185 cm  Weight: 136 kg  BSA: 2.6 m2  ------------------------------------------------------------------------  PROCEDURE: Transthoracic echocardiogram with 2-D, M-Mode  and complete spectral and color flow Doppler. Intravenous  catheter inserted. Verbal consent was obtained for  injection of echo contrast following a discussion of risks  and benefits. Following intravenous injection of contrast,  harmonic imaging was performed.  INDICATION: Primary pulmonary hypertension(I27.0)  ------------------------------------------------------------------------  Dimensions:    Normal Values:  LA:     4.0    2.0 - 4.0 cm  Ao:     3.5    2.0 - 3.8 cm  SEPTUM: 1.1    0.6 - 1.2 cm  PWT:    1.2    0.6 - 1.1 cm  LVIDd:  4.9    3.0 -5.6 cm  LVIDs:  3.3    1.8 - 4.0 cm  Derived variables:  LVMI: 83 g/m2  RWT: 0.48  Fractional short: 33 %  EF (Teicholtz): 61 %  ------------------------------------------------------------------------  Observations:  Mitral Valve: Normal mitral valve. Minimal mitral  regurgitation.  Aortic Valve/Aorta: Normal trileaflet aortic valve.  Aortic Root: 3.5 cm.  Left Atrium: Normal left atrium.  LA volume index = 20  cc/m2.  Left Ventricle: Normal left ventricular systolic function.  No segmental wallmotion abnormalities. Endocardial  visualization enhanced with intravenous injection of echo  contrast (Definity). Increased relative wall thickness with  normal left ventricular mass index, consistent with  concentric left ventricular remodeling.  Right Heart: Normal right atrium. Right ventricular  enlargement with normal right ventricular systolic  function. Normal tricuspid valve. Minimal tricuspid  regurgitation. Normal pulmonic valve.  Pericardium/Pleura: Normal pericardium with no pericardial  effusion.  Hemodynamic: Estimated right atrial pressure is 8 mm Hg.  Estimated right ventricular systolic pressure equals 29 mm  Hg, assuming right atrial pressure equals 8 mm Hg,  consistent with normal pulmonary pressures.  ------------------------------------------------------------------------  Conclusions:  1. Increased relative wall thickness with normal left  ventricular mass index, consistent with concentric left  ventricular remodeling.  2. Normal left ventricular systolic function. No segmental  wall motion abnormalities. Endocardial visualization  enhanced with intravenous injection of echo contrast  (Definity).  ------------------------------------------------------------------------  Confirmed on  2017 - 13:49:24 by RASHEED Del Cid  ------------------------------------------------------------------------      Radiology:         [facility] -- Luly Egan Pannella, Patel, Savella [facility]  Office # [phone]      Follow Up:  cardiac optimization     Subjective/Observations:     No events overnight resting comfortably in bed.  No complaints of chest pain, dyspnea, or palpitations reported. No signs of orthopnea or PND.  remains on room air offers no complaints     REVIEW OF SYSTEMS: All other review of systems is negative unless indicated above    PAST MEDICAL & SURGICAL HISTORY:  Hypertension      Hyperlipidemia      Diabetes mellitus  Type 1 on insulin pump      CKD (chronic kidney disease)  Renal Transplant  at [facility]      Diabetic peripheral neuropathy      Obesity (BMI 30-39.9)      CVA (cerebral vascular accident)  Wallenberg Stroke  low L body sensation  low R face sensation  decreased peripheral vision  poor balance          Squamous cell carcinoma of skin of left ear  nose      History of DVT (deep vein thrombosis)  LLE , was on Eliquis x 6 months  Was hospitalized for Rhinovirus at the time, intubated      Recurrent squamous cell carcinoma of skin  nose, L arm      History of pleural effusion  right thoracentesis 2022 and 2022      CARMEN treated with BiPAP  2L O2 at night      History of restrictive lung disease      Toe infection   L 4th Toe surgery-amputation secondary to osteomyelitis   partial right 2nd 4th toe amputation      Ear disease  Left inner ear surgery, 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 1000 milliLiter(s) (50 mL/Hr) IV Continuous <Continuous>  dextrose 5%. 1000 milliLiter(s) (100 mL/Hr) IV Continuous <Continuous>  dextrose 5%. 1000 milliLiter(s) (100 mL/Hr) IV Continuous <Continuous>  dextrose 50% Injectable 25 Gram(s) IV Push once  dextrose 50% Injectable 12.5 Gram(s) IV Push once  dextrose 50% Injectable 25 Gram(s) IV Push once  gabapentin 300 milliGRAM(s) Oral two times a day  glucagon  Injectable 1 milliGRAM(s) IntraMuscular once  hydrALAZINE 100 milliGRAM(s) Oral three times a day  insulin lispro (HumaLOG) Pump 1 Each SubCutaneous Continuous Pump  melatonin 5 milliGRAM(s) Oral at bedtime  metoprolol tartrate 50 milliGRAM(s) Oral two times a day  tacrolimus 1.5 milliGRAM(s) Oral <User Schedule>  trimethoprim   80 mG/sulfamethoxazole 400 mG 1 Tablet(s) Oral daily    MEDICATIONS  (PRN):  acetaminophen     Tablet .. 650 milliGRAM(s) Oral every 6 hours PRN Temp greater or equal to 38C (100.4F), Mild Pain (1 - 3)  dextrose Oral Gel 15 Gram(s) Oral once PRN Blood Glucose LESS THAN 70 milliGRAM(s)/deciliter      Allergies    No Known Allergies    Intolerances        Vital Signs Last 24 Hrs  T(C): 36.5 (2023 06:09), Max: 37.1 (2023 21:13)  T(F): 97.7 (2023 06:09), Max: 98.7 (2023 21:13)  HR: 64 (2023 06:09) (62 - 69)  BP: 136/78 (2023 06:09) (121/73 - 167/76)  BP(mean): --  RR: 19 (2023 06:09) (16 - 20)  SpO2: 92% (2023 06:09) (92% - 97%)    Parameters below as of 2023 06:09  Patient On (Oxygen Delivery Method): room air        I&O's Summary    Weight (kg): 142.9 (- @ 11:01)    PHYSICAL EXAM:  TELE: not on tele   Constitutional: NAD, awake and alert, obese  HEENT: Moist Mucous Membranes, Anicteric  Pulmonary: Non-labored, breath sounds are clear bilaterally, No wheezing, crackles or rhonchi  Cardiovascular: Regular, S1 and S2 nl, No murmurs, rubs, gallops or clicks  Gastrointestinal: Bowel Sounds present, soft, nontender.   Lymph: No lymphadenopathy. No peripheral edema.  Skin: No visible rashes or ulcers.  Psych:  Mood & affect appropriate    LABS: All Labs Reviewed:                        11.9   5.75  )-----------( 200      ( 2023 06:41 )             37.2                         12.6   6.44  )-----------( 235      ( 2023 11:55 )             40.3     2023 06:41    142    |  106    |  28     ----------------------------<  119    4.3     |  34     |  1.30   2023 11:55    141    |  108    |  28     ----------------------------<  75     4.2     |  30     |  1.30     Ca    8.9        2023 06:41  Ca    9.4        2023 11:55    TPro  6.8    /  Alb  3.2    /  TBili  0.5    /  DBili  x      /  AST  15     /  ALT  16     /  AlkPhos  86     2023 06:41  TPro  8.0    /  Alb  3.6    /  TBili  0.4    /  DBili  x      /  AST  14     /  ALT  16     /  AlkPhos  108    2023 11:55    PT/INR - ( 2023 06:41 )   PT: 13.0 sec;   INR: 1.11 ratio         PTT - ( 2023 11:55 )  PTT:30.2 sec         EC Lead ECG:   Ventricular Rate 66 BPM    Atrial Rate 66 BPM    P-R Interval 244 ms    QRS Duration 86 ms    Q-T Interval 384 ms    QTC Calculation(Bazett) 402 ms    P Axis 10 degrees    R Axis -6 degrees    T Axis 16 degrees    Diagnosis Line Sinus rhythm with 1st degree AV block  Otherwise normal ECG  When compared with ECG of 12-AUG-2022 10:39,  aberrant conduction is no longer present  QT has shortened  Confirmed by JEAN-PIERRE LEIVA ([id number]) on 2023 6:56:01 PM (23 @ 11:48)      Patient name: LUTHER NORIEGA  YOB: 1965   Age: 51 (M)   MR#: [id number]  Study Date: 2017  Location: O/PSonographer: Susie Zuleta RDCS  Study quality: Technically difficult  Referring Physician: Kashmir Ochoa MD  Blood Pressure: 150/77 mmHg  Height: 185 cm  Weight: 136 kg  BSA: 2.6 m2  ------------------------------------------------------------------------  PROCEDURE: Transthoracic echocardiogram with 2-D, M-Mode  and complete spectral and color flow Doppler. Intravenous  catheter inserted. Verbal consent was obtained for  injection of echo contrast following a discussion of risks  and benefits. Following intravenous injection of contrast,  harmonic imaging was performed.  INDICATION: Primary pulmonary hypertension(I27.0)  ------------------------------------------------------------------------  Dimensions:    Normal Values:  LA:     4.0    2.0 - 4.0 cm  Ao:     3.5    2.0 - 3.8 cm  SEPTUM: 1.1    0.6 - 1.2 cm  PWT:    1.2    0.6 - 1.1 cm  LVIDd:  4.9    3.0 -5.6 cm  LVIDs:  3.3    1.8 - 4.0 cm  Derived variables:  LVMI: 83 g/m2  RWT: 0.48  Fractional short: 33 %  EF (Blasicholtz): 61 %  ------------------------------------------------------------------------  Observations:  Mitral Valve: Normal mitral valve. Minimal mitral  regurgitation.  Aortic Valve/Aorta: Normal trileaflet aortic valve.  Aortic Root: 3.5 cm.  Left Atrium: Normal left atrium.  LA volume index = 20  cc/m2.  Left Ventricle: Normal left ventricular systolic function.  No segmental wallmotion abnormalities. Endocardial  visualization enhanced with intravenous injection of echo  contrast (Definity). Increased relative wall thickness with  normal left ventricular mass index, consistent with  concentric left ventricular remodeling.  Right Heart: Normal right atrium. Right ventricular  enlargement with normal right ventricular systolic  function. Normal tricuspid valve. Minimal tricuspid  regurgitation. Normal pulmonic valve.  Pericardium/Pleura: Normal pericardium with no pericardial  effusion.  Hemodynamic: Estimated right atrial pressure is 8 mm Hg.  Estimated right ventricular systolic pressure equals 29 mm  Hg, assuming right atrial pressure equals 8 mm Hg,  consistent with normal pulmonary pressures.  ------------------------------------------------------------------------  Conclusions:  1. Increased relative wall thickness with normal left  ventricular mass index, consistent with concentric left  ventricular remodeling.  2. Normal left ventricular systolic function. No segmental  wall motion abnormalities. Endocardial visualization  enhanced with intravenous injection of echo contrast  (Definity).  ------------------------------------------------------------------------  Confirmed on  2017 - 13:49:24 by RASHEED Del Cid  ------------------------------------------------------------------------      Radiology:

## 2023-01-18 NOTE — BRIEF OPERATIVE NOTE - NSICDXBRIEFPREOP_GEN_ALL_CORE_FT
PRE-OP DIAGNOSIS:  Diabetic Charcot foot 18-Jan-2023 18:21:24  Donte Catherine  DM foot ulcer 18-Jan-2023 18:21:31  Donte Catherine

## 2023-01-18 NOTE — PROGRESS NOTE ADULT - SUBJECTIVE AND OBJECTIVE BOX
Patient is a 57y old  Male who presents with a chief complaint of Right Charcot Foot (18 Jan 2023 11:46)      Subjective:  INTERVAL HPI/OVERNIGHT EVENTS: Patient seen and examined at bedside. No overnight events occurred. Patient has no complaints at this time. Denies fevers, chills, headache, lightheadedness, chest pain, dyspnea, abdominal pain, n/v/d/c.    MEDICATIONS  (STANDING):  aspirin  chewable 81 milliGRAM(s) Oral daily  atorvastatin 10 milliGRAM(s) Oral at bedtime  azaTHIOprine 100 milliGRAM(s) Oral daily  cloNIDine 0.1 milliGRAM(s) Oral two times a day  dextrose 5%. 1000 milliLiter(s) (50 mL/Hr) IV Continuous <Continuous>  dextrose 5%. 1000 milliLiter(s) (100 mL/Hr) IV Continuous <Continuous>  dextrose 5%. 1000 milliLiter(s) (100 mL/Hr) IV Continuous <Continuous>  dextrose 50% Injectable 25 Gram(s) IV Push once  dextrose 50% Injectable 12.5 Gram(s) IV Push once  dextrose 50% Injectable 25 Gram(s) IV Push once  gabapentin 300 milliGRAM(s) Oral two times a day  glucagon  Injectable 1 milliGRAM(s) IntraMuscular once  hydrALAZINE 100 milliGRAM(s) Oral three times a day  insulin lispro (HumaLOG) Pump 1 Each SubCutaneous Continuous Pump  melatonin 5 milliGRAM(s) Oral at bedtime  metoprolol tartrate 50 milliGRAM(s) Oral two times a day  tacrolimus 1.5 milliGRAM(s) Oral <User Schedule>  trimethoprim   80 mG/sulfamethoxazole 400 mG 1 Tablet(s) Oral daily    MEDICATIONS  (PRN):  acetaminophen     Tablet .. 650 milliGRAM(s) Oral every 6 hours PRN Temp greater or equal to 38C (100.4F), Mild Pain (1 - 3)  dextrose Oral Gel 15 Gram(s) Oral once PRN Blood Glucose LESS THAN 70 milliGRAM(s)/deciliter      Allergies    No Known Allergies    Intolerances    REVIEW OF SYSTEMS:  CONSTITUTIONAL: No fever or chills  HEENT:  No headache, no sore throat  RESPIRATORY: No cough, wheezing, or shortness of breath  CARDIOVASCULAR: No chest pain, palpitations  GASTROINTESTINAL: No abd pain, nausea, vomiting, or diarrhea  GENITOURINARY: No dysuria, frequency, or hematuria  NEUROLOGICAL: no focal weakness or dizziness  MUSCULOSKELETAL: no myalgias    Objective:  Vital Signs Last 24 Hrs  T(C): 37.1 (18 Jan 2023 11:12), Max: 37.1 (17 Jan 2023 21:13)  T(F): 98.8 (18 Jan 2023 11:12), Max: 98.8 (18 Jan 2023 09:41)  HR: 63 (18 Jan 2023 11:12) (62 - 69)  BP: 162/75 (18 Jan 2023 11:12) (136/78 - 167/76)  BP(mean): --  RR: 18 (18 Jan 2023 11:12) (18 - 20)  SpO2: 95% (18 Jan 2023 11:12) (92% - 96%)    Parameters below as of 18 Jan 2023 06:09  Patient On (Oxygen Delivery Method): room air    GENERAL: NAD, lying in bed comfortably  HEAD:  Atraumatic, Normocephalic  ENT: Moist mucous membranes  NECK: Supple, No JVD  CHEST/LUNG: Clear to auscultation bilaterally; No rales, rhonchi, wheezing, or rubs. Unlabored respirations  HEART: Regular rate and rhythm; No murmurs, rubs, or gallops  ABDOMEN: obese, Bowel sounds present; Soft, Nontender, Nondistended  NERVOUS SYSTEM:  Alert & Oriented X3, speech clear. No deficits       LABS:                        11.9   5.75  )-----------( 200      ( 18 Jan 2023 06:41 )             37.2     CBC Full  -  ( 18 Jan 2023 06:41 )  WBC Count : 5.75 K/uL  Hemoglobin : 11.9 g/dL  Hematocrit : 37.2 %  Platelet Count - Automated : 200 K/uL  Mean Cell Volume : 88.8 fl  Mean Cell Hemoglobin : 28.4 pg  Mean Cell Hemoglobin Concentration : 32.0 gm/dL  Auto Neutrophil # : 3.85 K/uL  Auto Lymphocyte # : 1.02 K/uL  Auto Monocyte # : 0.56 K/uL  Auto Eosinophil # : 0.25 K/uL  Auto Basophil # : 0.05 K/uL  Auto Neutrophil % : 67.1 %  Auto Lymphocyte % : 17.7 %  Auto Monocyte % : 9.7 %  Auto Eosinophil % : 4.3 %  Auto Basophil % : 0.9 %    18 Jan 2023 06:41    142    |  106    |  28     ----------------------------<  119    4.3     |  34     |  1.30     Ca    8.9        18 Jan 2023 06:41    TPro  6.8    /  Alb  3.2    /  TBili  0.5    /  DBili  x      /  AST  15     /  ALT  16     /  AlkPhos  86     18 Jan 2023 06:41    PT/INR - ( 18 Jan 2023 06:41 )   PT: 13.0 sec;   INR: 1.11 ratio         PTT - ( 17 Jan 2023 11:55 )  PTT:30.2 sec    CAPILLARY BLOOD GLUCOSE      POCT Blood Glucose.: 87 mg/dL (18 Jan 2023 10:33)  POCT Blood Glucose.: 104 mg/dL (18 Jan 2023 07:48)  POCT Blood Glucose.: 154 mg/dL (18 Jan 2023 06:26)  POCT Blood Glucose.: 87 mg/dL (18 Jan 2023 05:47)  POCT Blood Glucose.: 78 mg/dL (18 Jan 2023 05:37)  POCT Blood Glucose.: 99 mg/dL (18 Jan 2023 04:54)  POCT Blood Glucose.: 54 mg/dL (18 Jan 2023 04:29)  POCT Blood Glucose.: 131 mg/dL (17 Jan 2023 22:26)  POCT Blood Glucose.: 106 mg/dL (17 Jan 2023 17:30)          RADIOLOGY & ADDITIONAL TESTS:    Personally reviewed.     Consultant(s) Notes Reviewed:  [x] YES  [ ] NO     Patient is a 57y old  Male who presents with a chief complaint of Right Charcot Foot (18 Jan 2023 11:46)      Subjective:  INTERVAL HPI/OVERNIGHT EVENTS: Patient seen and examined at bedside. No overnight events occurred. Patient has no complaints at this time. No fever/chills. Pain well controlled.  Denies fevers, chills, headache, lightheadedness, chest pain, dyspnea, abdominal pain, n/v/d/c.    MEDICATIONS  (STANDING):  aspirin  chewable 81 milliGRAM(s) Oral daily  atorvastatin 10 milliGRAM(s) Oral at bedtime  azaTHIOprine 100 milliGRAM(s) Oral daily  cloNIDine 0.1 milliGRAM(s) Oral two times a day  dextrose 5%. 1000 milliLiter(s) (50 mL/Hr) IV Continuous <Continuous>  dextrose 5%. 1000 milliLiter(s) (100 mL/Hr) IV Continuous <Continuous>  dextrose 5%. 1000 milliLiter(s) (100 mL/Hr) IV Continuous <Continuous>  dextrose 50% Injectable 25 Gram(s) IV Push once  dextrose 50% Injectable 12.5 Gram(s) IV Push once  dextrose 50% Injectable 25 Gram(s) IV Push once  gabapentin 300 milliGRAM(s) Oral two times a day  glucagon  Injectable 1 milliGRAM(s) IntraMuscular once  hydrALAZINE 100 milliGRAM(s) Oral three times a day  insulin lispro (HumaLOG) Pump 1 Each SubCutaneous Continuous Pump  melatonin 5 milliGRAM(s) Oral at bedtime  metoprolol tartrate 50 milliGRAM(s) Oral two times a day  tacrolimus 1.5 milliGRAM(s) Oral <User Schedule>  trimethoprim   80 mG/sulfamethoxazole 400 mG 1 Tablet(s) Oral daily    MEDICATIONS  (PRN):  acetaminophen     Tablet .. 650 milliGRAM(s) Oral every 6 hours PRN Temp greater or equal to 38C (100.4F), Mild Pain (1 - 3)  dextrose Oral Gel 15 Gram(s) Oral once PRN Blood Glucose LESS THAN 70 milliGRAM(s)/deciliter      Allergies    No Known Allergies    Intolerances    REVIEW OF SYSTEMS:  CONSTITUTIONAL: No fever or chills  HEENT:  No headache, no sore throat  RESPIRATORY: No cough, wheezing, or shortness of breath  CARDIOVASCULAR: No chest pain, palpitations  GASTROINTESTINAL: No abd pain, nausea, vomiting, or diarrhea  GENITOURINARY: No dysuria, frequency, or hematuria  NEUROLOGICAL: no focal weakness or dizziness  MUSCULOSKELETAL: no uncontrolled myalgias/arthralgias    Objective:  Vital Signs Last 24 Hrs  T(C): 37.1 (18 Jan 2023 11:12), Max: 37.1 (17 Jan 2023 21:13)  T(F): 98.8 (18 Jan 2023 11:12), Max: 98.8 (18 Jan 2023 09:41)  HR: 63 (18 Jan 2023 11:12) (62 - 69)  BP: 162/75 (18 Jan 2023 11:12) (136/78 - 167/76)  BP(mean): --  RR: 18 (18 Jan 2023 11:12) (18 - 20)  SpO2: 95% (18 Jan 2023 11:12) (92% - 96%)    Parameters below as of 18 Jan 2023 06:09  Patient On (Oxygen Delivery Method): room air    GENERAL: NAD, lying in bed comfortably  HEAD:  Atraumatic, Normocephalic  ENT: Moist mucous membranes, no oral lesions visualized  NECK: Supple, No JVD  CHEST/LUNG: Clear to auscultation bilaterally; No rales, rhonchi, wheezing, or rubs. Unlabored respirations  HEART: Regular rate and rhythm; No murmurs, rubs, or gallops  ABDOMEN: obese, Bowel sounds present; Soft, Nontender, Nondistended  NERVOUS SYSTEM:  Alert & Oriented X3, speech clear. No deficits       LABS:                        11.9   5.75  )-----------( 200      ( 18 Jan 2023 06:41 )             37.2     CBC Full  -  ( 18 Jan 2023 06:41 )  WBC Count : 5.75 K/uL  Hemoglobin : 11.9 g/dL  Hematocrit : 37.2 %  Platelet Count - Automated : 200 K/uL  Mean Cell Volume : 88.8 fl  Mean Cell Hemoglobin : 28.4 pg  Mean Cell Hemoglobin Concentration : 32.0 gm/dL  Auto Neutrophil # : 3.85 K/uL  Auto Lymphocyte # : 1.02 K/uL  Auto Monocyte # : 0.56 K/uL  Auto Eosinophil # : 0.25 K/uL  Auto Basophil # : 0.05 K/uL  Auto Neutrophil % : 67.1 %  Auto Lymphocyte % : 17.7 %  Auto Monocyte % : 9.7 %  Auto Eosinophil % : 4.3 %  Auto Basophil % : 0.9 %    18 Jan 2023 06:41    142    |  106    |  28     ----------------------------<  119    4.3     |  34     |  1.30     Ca    8.9        18 Jan 2023 06:41    TPro  6.8    /  Alb  3.2    /  TBili  0.5    /  DBili  x      /  AST  15     /  ALT  16     /  AlkPhos  86     18 Jan 2023 06:41    PT/INR - ( 18 Jan 2023 06:41 )   PT: 13.0 sec;   INR: 1.11 ratio         PTT - ( 17 Jan 2023 11:55 )  PTT:30.2 sec    CAPILLARY BLOOD GLUCOSE      POCT Blood Glucose.: 87 mg/dL (18 Jan 2023 10:33)  POCT Blood Glucose.: 104 mg/dL (18 Jan 2023 07:48)  POCT Blood Glucose.: 154 mg/dL (18 Jan 2023 06:26)  POCT Blood Glucose.: 87 mg/dL (18 Jan 2023 05:47)  POCT Blood Glucose.: 78 mg/dL (18 Jan 2023 05:37)  POCT Blood Glucose.: 99 mg/dL (18 Jan 2023 04:54)  POCT Blood Glucose.: 54 mg/dL (18 Jan 2023 04:29)  POCT Blood Glucose.: 131 mg/dL (17 Jan 2023 22:26)  POCT Blood Glucose.: 106 mg/dL (17 Jan 2023 17:30)          RADIOLOGY & ADDITIONAL TESTS:    Personally reviewed.     Consultant(s) Notes Reviewed:  [x] YES  [ ] NO

## 2023-01-18 NOTE — BRIEF OPERATIVE NOTE - ESTIMATED BLOOD LOSS
100 Estlander Flap (Upper To Lower Lip) Text: The defect of the lower lip was assessed and measured.  Given the location and size of the defect, an Estlander flap was deemed most appropriate.  Using a sterile surgical marker, an appropriate Estlander flap was measured and drawn on the upper lip. Local anesthesia was then infiltrated. A scalpel was then used to incise the lateral aspect of the flap, through skin, muscle and mucosa, leaving the flap pedicled medially.  The flap was then rotated and positioned to fill the lower lip defect.  The flap was then sutured into place with a three layer technique, closing the orbicularis oris muscle layer with subcutaneous buried sutures, followed by a mucosal layer and an epidermal layer.

## 2023-01-18 NOTE — PROGRESS NOTE ADULT - PROBLEM SELECTOR PLAN 6
Chronic   - continue home bactrim, azathioprine, and tacrolimus [doses confirmed with patient and pharmacy]  -nephrology consulted Dr Bryant  -anemia: no signs or symptoms of active bleeding. Continue to monitor hgb. Likely due to chronic disease Chronic   - continue home bactrim, azathioprine, and tacrolimus [doses confirmed with patient and pharmacy]  - nephrology consulted Dr Bryant    #anemia  - suspect 2/2 CKD  - no clinical evidence of blood loss

## 2023-01-18 NOTE — PROGRESS NOTE ADULT - ASSESSMENT
57-year-old male with PMHx of type 1 diabetes mellitus, CVA, recurrent right pleural effusion with VATS, renal transplant sent in from wound care for admission for right Charcot foot surgery with Dr. Catherine.

## 2023-01-18 NOTE — CONSULT NOTE ADULT - PROBLEM SELECTOR RECOMMENDATION 9
cont current insulin pump settings  to set 90% temp basal  cont cons cho diet  goal bg 100-180 in hosp setting
Patient examined and evaluated at this time.  Discussed the etiology and treatment plan with patient.  Discussed the risk, benefits, and potential complications with the patient.  All questions answered to satisfaction and patient verbalized understanding.  Will plan for right Achilles tendon lengthening, right PT tendon lengthening, right peroneal tendon detachment and reattachment with fifth metatarsal base and cuboid exostectomy, right fifth digit proximal phalanx base resection tomorrow in the operating room.  Please provide medical and cardio risk stratification and optimization.
Type  A1c % adm podiatry surgery scheduled 1/18  insulin pump CGM- intact and functioning well.  Recommend endocrine, RD consults  consent form and attestation form- completed with the patient  DSC recommendations: return to home regimen and glucose monitoring  diabetes education provided  Diabetes support info and cell # [phone] given   Goal 100-180 mg/dL; 140-180 mg/dL in critical care areas

## 2023-01-18 NOTE — PROGRESS NOTE ADULT - SUBJECTIVE AND OBJECTIVE BOX
2:30pm DWAINE patient in holding.  mg/dL pump suspended/removed and placed in PACU. IV dextrose 5% @ 100/hr infusing. patient states he feels fine- knows when he drops low. dexcom removed and place with his belongings (patient preferred). DWAINE longoria and alok re: OR POCT testing to be done if case extends longer than expected. DWAINE PACU- pump secured with nursing. discussed patient to charge RN, patient will reconnect in PACU.

## 2023-01-18 NOTE — PROGRESS NOTE ADULT - PROBLEM SELECTOR PLAN 1
Type 1 A1c 7.2% s/p hypoglycemic event due to NPO status/full basal rate infusing  insulin pump CGM- functioning well.   pump resumed at 1 unit/hr  Recommend endocrine, RD  continue to monitor to be stable for OR today.  Goal 100-180 mg/dL

## 2023-01-18 NOTE — PROGRESS NOTE ADULT - ASSESSMENT
Physical Exam:   Vital Signs Last 24 Hrs  T(C): 36.5 (18 Jan 2023 06:09), Max: 37.1 (17 Jan 2023 21:13)  T(F): 97.7 (18 Jan 2023 06:09), Max: 98.7 (17 Jan 2023 21:13)  HR: 64 (18 Jan 2023 06:09) (62 - 69)  BP: 136/78 (18 Jan 2023 06:09) (121/73 - 167/76)  BP(mean): --  RR: 19 (18 Jan 2023 06:09) (16 - 20)  SpO2: 92% (18 Jan 2023 06:09) (92% - 97%)    Parameters below as of 18 Jan 2023 06:09  Patient On (Oxygen Delivery Method): room air             CAPILLARY BLOOD GLUCOSE      POCT Blood Glucose.: 154 mg/dL (18 Jan 2023 06:26)  POCT Blood Glucose.: 87 mg/dL (18 Jan 2023 05:47)  POCT Blood Glucose.: 78 mg/dL (18 Jan 2023 05:37)  POCT Blood Glucose.: 99 mg/dL (18 Jan 2023 04:54)  POCT Blood Glucose.: 54 mg/dL (18 Jan 2023 04:29)  POCT Blood Glucose.: 131 mg/dL (17 Jan 2023 22:26)  POCT Blood Glucose.: 106 mg/dL (17 Jan 2023 17:30)      Cholesterol, Serum: 113 mg/dL (05.19.21 @ 08:36)     HDL Cholesterol, Serum: 22 mg/dL (05.19.21 @ 08:36)     LDL Cholesterol Calculated: 66 mg/dL (05.19.21 @ 08:36)     DIET: NPO

## 2023-01-18 NOTE — PHYSICAL THERAPY INITIAL EVALUATION ADULT - ADDITIONAL COMMENTS
Pt lives in a private house house with wife and children (+) [place], Pt will be staying with his parents with no steps

## 2023-01-18 NOTE — PROGRESS NOTE ADULT - PROBLEM SELECTOR PLAN 2
Chronic   - as patient was hypoglycemic overnight and required D50x2 and was placed on D5 100cc/hr drip pre-op   - pt to use home insulin pump with home settings  - Pat Weil consulted  - patient has continuous glucose monitor Chronic   - as patient was hypoglycemic overnight and required D50x2 and was placed on D5 100cc/hr drip pre-op   - Will monitor glucose levels post-op when PO diet resumes   - pt to use home insulin pump with home settings  - Pat Weil consulted  - patient has continuous glucose monitor Chronic   - as patient was hypoglycemic overnight and required D50x2 and was placed on D5 100cc/hr drip pre-op   - Will monitor glucose levels post-op when PO diet resumes   - pt to use home insulin pump with home settings - diabetes education following and will assist w/ pump adjustments (with a focus on the perio-op time period)  - patient has continuous glucose monitor - will moniotr fingersticks per protocol

## 2023-01-18 NOTE — PROGRESS NOTE ADULT - ASSESSMENT
Physical Exam:   Vital Signs Last 24 Hrs  T(C): 37.1 (18 Jan 2023 11:12), Max: 37.1 (17 Jan 2023 21:13)  T(F): 98.8 (18 Jan 2023 11:12), Max: 98.8 (18 Jan 2023 09:41)  HR: 63 (18 Jan 2023 11:12) (62 - 69)  BP: 162/75 (18 Jan 2023 11:12) (136/78 - 167/76)  BP(mean): --  RR: 18 (18 Jan 2023 11:12) (18 - 20)  SpO2: 95% (18 Jan 2023 11:12) (92% - 96%)    Parameters below as of 18 Jan 2023 06:09  Patient On (Oxygen Delivery Method): room air             CAPILLARY BLOOD GLUCOSE      POCT Blood Glucose.: 118 mg/dL (18 Jan 2023 14:35)  POCT Blood Glucose.: 91 mg/dL (18 Jan 2023 12:35)  POCT Blood Glucose.: 87 mg/dL (18 Jan 2023 10:33)  POCT Blood Glucose.: 104 mg/dL (18 Jan 2023 07:48)  POCT Blood Glucose.: 154 mg/dL (18 Jan 2023 06:26)  POCT Blood Glucose.: 87 mg/dL (18 Jan 2023 05:47)  POCT Blood Glucose.: 78 mg/dL (18 Jan 2023 05:37)  POCT Blood Glucose.: 99 mg/dL (18 Jan 2023 04:54)  POCT Blood Glucose.: 54 mg/dL (18 Jan 2023 04:29)  POCT Blood Glucose.: 131 mg/dL (17 Jan 2023 22:26)  POCT Blood Glucose.: 106 mg/dL (17 Jan 2023 17:30)      Cholesterol, Serum: 113 mg/dL (05.19.21 @ 08:36)     HDL Cholesterol, Serum: 22 mg/dL (05.19.21 @ 08:36)     LDL Cholesterol Calculated: 66 mg/dL (05.19.21 @ 08:36)     DIET: NPO

## 2023-01-18 NOTE — CARE COORDINATION ASSESSMENT. - NSPASTMEDSURGHISTORY_GEN_ALL_CORE_FT
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 have MRI      Toe infection  2007 L 4th Toe surgery-amputation secondary to osteomyelitis  2019 partial right 2nd 4th toe amputation      Obesity (BMI 30-39.9)      Diabetic peripheral neuropathy      H/O foot surgery  2005 - right foot - bone fracture - s/p ORIF and subsequent removal of hardware      End-stage renal failure with renal transplant  12/2013      CVA (cerebral vascular accident)  [place] Stroke  low L body sensation  low R face sensation  decreased peripheral vision  poor balance  2015        History of DVT (deep vein thrombosis)  LLE 2016, was on Eliquis x 6 months  Was hospitalized for Rhinovirus at the time, intubated      Squamous cell carcinoma of skin of left ear  nose      S/P kidney transplant  2013      Recurrent squamous cell carcinoma of skin  nose, L arm      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

## 2023-01-19 LAB
ANION GAP SERPL CALC-SCNC: 2 MMOL/L — LOW (ref 5–17)
BASOPHILS # BLD AUTO: 0.05 K/UL — SIGNIFICANT CHANGE UP (ref 0–0.2)
BASOPHILS NFR BLD AUTO: 0.6 % — SIGNIFICANT CHANGE UP (ref 0–2)
BUN SERPL-MCNC: 24 MG/DL — HIGH (ref 7–23)
CALCIUM SERPL-MCNC: 8.7 MG/DL — SIGNIFICANT CHANGE UP (ref 8.5–10.1)
CHLORIDE SERPL-SCNC: 105 MMOL/L — SIGNIFICANT CHANGE UP (ref 96–108)
CO2 SERPL-SCNC: 32 MMOL/L — HIGH (ref 22–31)
CREAT SERPL-MCNC: 1.2 MG/DL — SIGNIFICANT CHANGE UP (ref 0.5–1.3)
EGFR: 71 ML/MIN/1.73M2 — SIGNIFICANT CHANGE UP
EOSINOPHIL # BLD AUTO: 0.2 K/UL — SIGNIFICANT CHANGE UP (ref 0–0.5)
EOSINOPHIL NFR BLD AUTO: 2.4 % — SIGNIFICANT CHANGE UP (ref 0–6)
GLUCOSE SERPL-MCNC: 99 MG/DL — SIGNIFICANT CHANGE UP (ref 70–99)
HCT VFR BLD CALC: 36.6 % — LOW (ref 39–50)
HGB BLD-MCNC: 11.6 G/DL — LOW (ref 13–17)
IMM GRANULOCYTES NFR BLD AUTO: 0.5 % — SIGNIFICANT CHANGE UP (ref 0–0.9)
LYMPHOCYTES # BLD AUTO: 0.88 K/UL — LOW (ref 1–3.3)
LYMPHOCYTES # BLD AUTO: 10.5 % — LOW (ref 13–44)
MCHC RBC-ENTMCNC: 28 PG — SIGNIFICANT CHANGE UP (ref 27–34)
MCHC RBC-ENTMCNC: 31.7 GM/DL — LOW (ref 32–36)
MCV RBC AUTO: 88.4 FL — SIGNIFICANT CHANGE UP (ref 80–100)
MONOCYTES # BLD AUTO: 0.81 K/UL — SIGNIFICANT CHANGE UP (ref 0–0.9)
MONOCYTES NFR BLD AUTO: 9.6 % — SIGNIFICANT CHANGE UP (ref 2–14)
NEUTROPHILS # BLD AUTO: 6.43 K/UL — SIGNIFICANT CHANGE UP (ref 1.8–7.4)
NEUTROPHILS NFR BLD AUTO: 76.4 % — SIGNIFICANT CHANGE UP (ref 43–77)
NRBC # BLD: 0 /100 WBCS — SIGNIFICANT CHANGE UP (ref 0–0)
PLATELET # BLD AUTO: 224 K/UL — SIGNIFICANT CHANGE UP (ref 150–400)
POTASSIUM SERPL-MCNC: 4.3 MMOL/L — SIGNIFICANT CHANGE UP (ref 3.5–5.3)
POTASSIUM SERPL-SCNC: 4.3 MMOL/L — SIGNIFICANT CHANGE UP (ref 3.5–5.3)
RBC # BLD: 4.14 M/UL — LOW (ref 4.2–5.8)
RBC # FLD: 14.6 % — HIGH (ref 10.3–14.5)
SODIUM SERPL-SCNC: 139 MMOL/L — SIGNIFICANT CHANGE UP (ref 135–145)
TACROLIMUS SERPL-MCNC: 5.9 NG/ML — SIGNIFICANT CHANGE UP
WBC # BLD: 8.41 K/UL — SIGNIFICANT CHANGE UP (ref 3.8–10.5)
WBC # FLD AUTO: 8.41 K/UL — SIGNIFICANT CHANGE UP (ref 3.8–10.5)

## 2023-01-19 PROCEDURE — 99232 SBSQ HOSP IP/OBS MODERATE 35: CPT

## 2023-01-19 PROCEDURE — 99231 SBSQ HOSP IP/OBS SF/LOW 25: CPT

## 2023-01-19 PROCEDURE — 99232 SBSQ HOSP IP/OBS MODERATE 35: CPT | Mod: GC

## 2023-01-19 RX ORDER — AZATHIOPRINE 100 MG/1
100 TABLET ORAL DAILY
Refills: 0 | Status: DISCONTINUED | OUTPATIENT
Start: 2023-01-19 | End: 2023-01-20

## 2023-01-19 RX ADMIN — Medication 100 MILLIGRAM(S): at 22:24

## 2023-01-19 RX ADMIN — Medication 50 MILLIGRAM(S): at 17:37

## 2023-01-19 RX ADMIN — Medication 0.1 MILLIGRAM(S): at 17:37

## 2023-01-19 RX ADMIN — ENOXAPARIN SODIUM 40 MILLIGRAM(S): 100 INJECTION SUBCUTANEOUS at 09:12

## 2023-01-19 RX ADMIN — Medication 100 MILLIGRAM(S): at 13:54

## 2023-01-19 RX ADMIN — AZATHIOPRINE 100 MILLIGRAM(S): 100 TABLET ORAL at 12:43

## 2023-01-19 RX ADMIN — Medication 100 MILLIGRAM(S): at 13:53

## 2023-01-19 RX ADMIN — Medication 100 MILLIGRAM(S): at 22:20

## 2023-01-19 RX ADMIN — Medication 650 MILLIGRAM(S): at 05:53

## 2023-01-19 RX ADMIN — Medication 50 MILLIGRAM(S): at 05:19

## 2023-01-19 RX ADMIN — ATORVASTATIN CALCIUM 10 MILLIGRAM(S): 80 TABLET, FILM COATED ORAL at 22:20

## 2023-01-19 RX ADMIN — TACROLIMUS 1.5 MILLIGRAM(S): 5 CAPSULE ORAL at 22:20

## 2023-01-19 RX ADMIN — Medication 5 MILLIGRAM(S): at 22:25

## 2023-01-19 RX ADMIN — TACROLIMUS 1.5 MILLIGRAM(S): 5 CAPSULE ORAL at 09:12

## 2023-01-19 RX ADMIN — Medication 81 MILLIGRAM(S): at 12:43

## 2023-01-19 RX ADMIN — Medication 100 MILLIGRAM(S): at 05:19

## 2023-01-19 RX ADMIN — GABAPENTIN 300 MILLIGRAM(S): 400 CAPSULE ORAL at 17:37

## 2023-01-19 RX ADMIN — Medication 0.1 MILLIGRAM(S): at 05:20

## 2023-01-19 RX ADMIN — GABAPENTIN 300 MILLIGRAM(S): 400 CAPSULE ORAL at 05:20

## 2023-01-19 RX ADMIN — Medication 650 MILLIGRAM(S): at 05:23

## 2023-01-19 RX ADMIN — Medication 100 MILLIGRAM(S): at 05:20

## 2023-01-19 NOTE — PROGRESS NOTE ADULT - SUBJECTIVE AND OBJECTIVE BOX
Patient is a 57y old  Male who presents with a chief complaint of Right Charcot Foot (19 Jan 2023 11:36)    Patient seen in follow up for CKD, h/o Kidney transplant.        PAST MEDICAL HISTORY:  Hypertension    Hyperlipidemia    Diabetes mellitus    CKD (chronic kidney disease)    Diabetic peripheral neuropathy    Obesity (BMI 30-39.9)    CVA (cerebral vascular accident)    Squamous cell carcinoma of skin of left ear    History of DVT (deep vein thrombosis)    Recurrent squamous cell carcinoma of skin    History of pleural effusion    CARMEN treated with BiPAP    History of restrictive lung disease      MEDICATIONS  (STANDING):  aspirin  chewable 81 milliGRAM(s) Oral daily  atorvastatin 10 milliGRAM(s) Oral at bedtime  azaTHIOprine 100 milliGRAM(s) Oral daily  ceFAZolin   IVPB 2000 milliGRAM(s) IV Intermittent every 8 hours  cloNIDine 0.1 milliGRAM(s) Oral two times a day  dextrose 5%. 1000 milliLiter(s) (50 mL/Hr) IV Continuous <Continuous>  dextrose 5%. 1000 milliLiter(s) (100 mL/Hr) IV Continuous <Continuous>  dextrose 5%. 1000 milliLiter(s) (100 mL/Hr) IV Continuous <Continuous>  dextrose 50% Injectable 25 Gram(s) IV Push once  dextrose 50% Injectable 12.5 Gram(s) IV Push once  dextrose 50% Injectable 25 Gram(s) IV Push once  enoxaparin Injectable 40 milliGRAM(s) SubCutaneous every 24 hours  gabapentin 300 milliGRAM(s) Oral two times a day  glucagon  Injectable 1 milliGRAM(s) IntraMuscular once  hydrALAZINE 100 milliGRAM(s) Oral three times a day  insulin lispro (HumaLOG) Pump 1 Each SubCutaneous Continuous Pump  melatonin 5 milliGRAM(s) Oral at bedtime  metoprolol tartrate 50 milliGRAM(s) Oral two times a day  tacrolimus 1.5 milliGRAM(s) Oral <User Schedule>    MEDICATIONS  (PRN):  acetaminophen     Tablet .. 650 milliGRAM(s) Oral every 6 hours PRN Temp greater or equal to 38C (100.4F), Mild Pain (1 - 3)  dextrose Oral Gel 15 Gram(s) Oral once PRN Blood Glucose LESS THAN 70 milliGRAM(s)/deciliter    T(C): 36.8 (01-19-23 @ 12:50), Max: 37.1 (01-18-23 @ 09:41)  HR: 70 (01-19-23 @ 12:50) (63 - 80)  BP: 150/68 (01-19-23 @ 12:50) (116/67 - 168/77)  RR: 18 (01-19-23 @ 12:50) (13 - 19)  SpO2: 93% (01-19-23 @ 12:50) (92% - 99%)  Wt(kg): --  I&O's Detail    19 Jan 2023 07:01  -  19 Jan 2023 13:12  --------------------------------------------------------  IN:  Total IN: 0 mL    OUT:    Voided (mL): 400 mL  Total OUT: 400 mL    Total NET: -400 mL          PHYSICAL EXAM:  General: No distress  Respiratory: b/l air entry  Cardiovascular: S1 S2  Gastrointestinal: soft  Extremities:  no edema, Rt foot dressing                              11.6   8.41  )-----------( 224      ( 19 Jan 2023 06:40 )             36.6     01-19    139  |  105  |  24<H>  ----------------------------<  99  4.3   |  32<H>  |  1.20    Ca    8.7      19 Jan 2023 06:40    TPro  6.8  /  Alb  3.2<L>  /  TBili  0.5  /  DBili  x   /  AST  15  /  ALT  16  /  AlkPhos  86  01-18        LIVER FUNCTIONS - ( 18 Jan 2023 06:41 )  Alb: 3.2 g/dL / Pro: 6.8 g/dL / ALK PHOS: 86 U/L / ALT: 16 U/L / AST: 15 U/L / GGT: x               Sodium, Serum: 139 (01-19 @ 06:40)  Sodium, Serum: 142 (01-18 @ 06:41)  Sodium, Serum: 141 (01-17 @ 11:55)    Creatinine, Serum: 1.20 (01-19 @ 06:40)  Creatinine, Serum: 1.30 (01-18 @ 06:41)  Creatinine, Serum: 1.30 (01-17 @ 11:55)    Potassium, Serum: 4.3 (01-19 @ 06:40)  Potassium, Serum: 4.3 (01-18 @ 06:41)  Potassium, Serum: 4.2 (01-17 @ 11:55)    Hemoglobin: 11.6 (01-19 @ 06:40)  Hemoglobin: 11.9 (01-18 @ 06:41)  Hemoglobin: 12.6 (01-17 @ 11:55)

## 2023-01-19 NOTE — PROGRESS NOTE ADULT - ATTENDING COMMENTS
The patient was seen and evaluated independently by the attending physician.  - I have personally reviewed the pt's labs, imaging, micro data and consultant recommendations.    57-year-old male with PMHx of type 1 diabetes mellitus, CVA, recurrent right pleural effusion with VATS, renal transplant sent in from wound care for admission for right Charcot foot surgery with Dr. Catherine.     Pt's family at bedside. Discussed w/ pt and family. No new complaints. Hoping for dc tomorrow.     #charcot foot - OR 1/18/23  #recurrent R pleural effusion s/p VATS  #type 1 diabetes mellitus  #CVA  #CKD s/p renal transplant  -POD1   -hypoglycemia yesterday, readings are better today - adjust pump settings as needed - reviewed w/ diabetes specialist  -pod/cardio/nephro consulted - recs noted and appreciated  -f/u path tomorrow
The patient was seen and evaluated independently by the attending physician.  - I have personally reviewed the pt's labs, imaging, micro data and consultant recommendations.    57-year-old male with PMHx of type 1 diabetes mellitus, CVA, recurrent right pleural effusion with VATS, renal transplant sent in from wound care for admission for right Charcot foot surgery with Dr. Catherine.   #charcot foot - planned for OR 1/18/23  #recurrent R pleural effusion s/p VATS  #type 1 diabetes mellitus  #CVA  #CKD s/p renal transplant  -planning for OR  w/ podiatry today - to absolute medical contraindications to proceed with proposed surgical intervention  -hypoglycemia - may need to adjust pump setting - reviewed w/ diabetes specialist  -pod/cardio/nephro consulted - recs noted and appreciated

## 2023-01-19 NOTE — PROGRESS NOTE ADULT - SUBJECTIVE AND OBJECTIVE BOX
[facility] -- Julsia Jansen,  Luly, Karan Kelly Savella, Goodger  Office # [phone]    Follow Up:  Cardiac optimization    Subjective/Observations: seen and examined, awake, alert, sitting comfortably in bed, denies chest pain, dyspnea, palpitations or dizziness, orthopnea and PND. Tolerating room air. R foot dressing intact     REVIEW OF SYSTEMS: All other review of systems is negative unless indicated above  PAST MEDICAL & SURGICAL HISTORY:  Hypertension      Hyperlipidemia      Diabetes mellitus  Type 1 on insulin pump      CKD (chronic kidney disease)  Renal Transplant 2013 at [facility]      Diabetic peripheral neuropathy      Obesity (BMI 30-39.9)      CVA (cerebral vascular accident)  Wallenberg Stroke  low L body sensation  low R face sensation  decreased peripheral vision  poor balance  2015        Squamous cell carcinoma of skin of left ear  nose      History of DVT (deep vein thrombosis)  LLE 2016, was on Eliquis x 6 months  Was hospitalized for Rhinovirus at the time, intubated      Recurrent squamous cell carcinoma of skin  nose, L arm      History of pleural effusion  right thoracentesis 6/2022 and 8/2022      CARMEN treated with BiPAP  2L O2 at night      History of restrictive lung disease      Toe infection  2007 L 4th Toe surgery-amputation secondary to osteomyelitis  2019 partial right 2nd 4th toe amputation      Ear disease  Left inner ear surgery, 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, Can NOT have MRI      Vasectomy status  s/p b/l  vasectomy      H/O foot surgery  2005 - right foot - bone fracture - s/p ORIF and subsequent removal of hardware      End-stage renal failure with renal transplant  12/2013      S/P kidney transplant  2013      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      History of loop recorder  2015        MEDICATIONS  (STANDING):  aspirin  chewable 81 milliGRAM(s) Oral daily  atorvastatin 10 milliGRAM(s) Oral at bedtime  azaTHIOprine 100 milliGRAM(s) Oral daily  ceFAZolin   IVPB 2000 milliGRAM(s) IV Intermittent every 8 hours  cloNIDine 0.1 milliGRAM(s) Oral two times a day  dextrose 5%. 1000 milliLiter(s) (50 mL/Hr) IV Continuous <Continuous>  dextrose 5%. 1000 milliLiter(s) (100 mL/Hr) IV Continuous <Continuous>  dextrose 5%. 1000 milliLiter(s) (100 mL/Hr) IV Continuous <Continuous>  dextrose 50% Injectable 25 Gram(s) IV Push once  dextrose 50% Injectable 12.5 Gram(s) IV Push once  dextrose 50% Injectable 25 Gram(s) IV Push once  enoxaparin Injectable 40 milliGRAM(s) SubCutaneous every 24 hours  gabapentin 300 milliGRAM(s) Oral two times a day  glucagon  Injectable 1 milliGRAM(s) IntraMuscular once  hydrALAZINE 100 milliGRAM(s) Oral three times a day  insulin lispro (HumaLOG) Pump 1 Each SubCutaneous Continuous Pump  melatonin 5 milliGRAM(s) Oral at bedtime  metoprolol tartrate 50 milliGRAM(s) Oral two times a day  tacrolimus 1.5 milliGRAM(s) Oral <User Schedule>    MEDICATIONS  (PRN):  acetaminophen     Tablet .. 650 milliGRAM(s) Oral every 6 hours PRN Temp greater or equal to 38C (100.4F), Mild Pain (1 - 3)  dextrose Oral Gel 15 Gram(s) Oral once PRN Blood Glucose LESS THAN 70 milliGRAM(s)/deciliter    Allergies    No Known Allergies    Intolerances      Vital Signs Last 24 Hrs  T(C): 36.7 (19 Jan 2023 05:26), Max: 36.8 (18 Jan 2023 18:20)  T(F): 98.1 (19 Jan 2023 05:26), Max: 98.2 (18 Jan 2023 18:20)  HR: 73 (19 Jan 2023 05:26) (65 - 80)  BP: 141/69 (19 Jan 2023 05:26) (116/67 - 168/77)  BP(mean): --  RR: 18 (19 Jan 2023 05:26) (13 - 19)  SpO2: 96% (19 Jan 2023 05:26) (93% - 99%)    Parameters below as of 18 Jan 2023 20:50  Patient On (Oxygen Delivery Method): nasal cannula      I&O's Summary    19 Jan 2023 07:01  -  19 Jan 2023 11:37  --------------------------------------------------------  IN: 0 mL / OUT: 400 mL / NET: -400 mL        TELE: Not on telemetry   PHYSICAL EXAM:  Constitutional: NAD, awake and alert, well-developed  HEENT: Moist Mucous Membranes, Anicteric  Pulmonary: Non-labored, breath sounds are clear bilaterally, No wheezing, rales or rhonchi  Cardiovascular: Regular, S1 and S2, No murmurs, rubs, gallops or clicks  Gastrointestinal: Bowel Sounds present, soft, nontender.   Lymph: No peripheral edema. No lymphadenopathy.  Skin: No visible rashes or ulcers. Right foot dressing intact  Psych:  Mood & affect appropriate  LABS: All Labs Reviewed:                        11.6   8.41  )-----------( 224      ( 19 Jan 2023 06:40 )             36.6                         11.9   5.75  )-----------( 200      ( 18 Jan 2023 06:41 )             37.2                         12.6   6.44  )-----------( 235      ( 17 Jan 2023 11:55 )             40.3     19 Jan 2023 06:40    139    |  105    |  24     ----------------------------<  99     4.3     |  32     |  1.20   18 Jan 2023 06:41    142    |  106    |  28     ----------------------------<  119    4.3     |  34     |  1.30   17 Jan 2023 11:55    141    |  108    |  28     ----------------------------<  75     4.2     |  30     |  1.30     Ca    8.7        19 Jan 2023 06:40  Ca    8.9        18 Jan 2023 06:41  Ca    9.4        17 Jan 2023 11:55    TPro  6.8    /  Alb  3.2    /  TBili  0.5    /  DBili  x      /  AST  15     /  ALT  16     /  AlkPhos  86     18 Jan 2023 06:41  TPro  8.0    /  Alb  3.6    /  TBili  0.4    /  DBili  x      /  AST  14     /  ALT  16     /  AlkPhos  108    17 Jan 2023 11:55    PT/INR - ( 18 Jan 2023 06:41 )   PT: 13.0 sec;   INR: 1.11 ratio         PTT - ( 17 Jan 2023 11:55 )  PTT:30.2 sec      12 Lead ECG:   Ventricular Rate 66 BPM    Atrial Rate 66 BPM    P-R Interval 244 ms    QRS Duration 86 ms    Q-T Interval 384 ms    QTC Calculation(Bazett) 402 ms    P Axis 10 degrees    R Axis -6 degrees    T Axis 16 degrees    Diagnosis Line Sinus rhythm with 1st degree AV block  Otherwise normal ECG  When compared with ECG of 12-AUG-2022 10:39,  aberrant conduction is no longer present  QT has shortened  Confirmed by JEAN-PIERRE LEIVA ([id number]) on 1/17/2023 6:56:01 PM (01-17-23 @ 11:48)      Patient name: LUTHER NORIEGA  YOB: 1965   Age: 51 (M)   MR#: [id number]  Study Date: 7/27/2017  Location: O/PSonographer: Susie Zuleta RDCS  Study quality: Technically difficult  Referring Physician: Kashmir Ochoa MD  Blood Pressure: 150/77 mmHg  Height: 185 cm  Weight: 136 kg  BSA: 2.6 m2  ------------------------------------------------------------------------  PROCEDURE: Transthoracic echocardiogram with 2-D, M-Mode  and complete spectral and color flow Doppler. Intravenous  catheter inserted. Verbal consent was obtained for  injection of echo contrast following a discussion of risks  and benefits. Following intravenous injection of contrast,  harmonic imaging was performed.  INDICATION: Primary pulmonary hypertension(I27.0)  ------------------------------------------------------------------------  Dimensions:    Normal Values:  LA:     4.0    2.0 - 4.0 cm  Ao:     3.5    2.0 - 3.8 cm  SEPTUM: 1.1    0.6 - 1.2 cm  PWT:    1.2    0.6 - 1.1 cm  LVIDd:  4.9    3.0 -5.6 cm  LVIDs:  3.3    1.8 - 4.0 cm  Derived variables:  LVMI: 83 g/m2  RWT: 0.48  Fractional short: 33 %  EF (Teicholtz): 61 %  ------------------------------------------------------------------------  Observations:  Mitral Valve: Normal mitral valve. Minimal mitral  regurgitation.  Aortic Valve/Aorta: Normal trileaflet aortic valve.  Aortic Root: 3.5 cm.  Left Atrium: Normal left atrium.  LA volume index = 20  cc/m2.  Left Ventricle: Normal left ventricular systolic function.  No segmental wallmotion abnormalities. Endocardial  visualization enhanced with intravenous injection of echo  contrast (Definity). Increased relative wall thickness with  normal left ventricular mass index, consistent with  concentric left ventricular remodeling.  Right Heart: Normal right atrium. Right ventricular  enlargement with normal right ventricular systolic  function. Normal tricuspid valve. Minimal tricuspid  regurgitation. Normal pulmonic valve.  Pericardium/Pleura: Normal pericardium with no pericardial  effusion.  Hemodynamic: Estimated right atrial pressure is 8 mm Hg.  Estimated right ventricular systolic pressure equals 29 mm  Hg, assuming right atrial pressure equals 8 mm Hg,  consistent with normal pulmonary pressures.  ------------------------------------------------------------------------  Conclusions:  1. Increased relative wall thickness with normal left  ventricular mass index, consistent with concentric left  ventricular remodeling.  2. Normal left ventricular systolic function. No segmental  wall motion abnormalities. Endocardial visualization  enhanced with intravenous injection of echo contrast  (Definity).  ------------------------------------------------------------------------  Confirmed on  7/27/2017 - 13:49:24 by RASHEED Del Cid  ------------------------------------------------------------------------

## 2023-01-19 NOTE — PROGRESS NOTE ADULT - PROBLEM SELECTOR PLAN 4
Chronic   - continue home hydralazine, clonidine, metoprolol, losartan with hold parameters   - monitor hemodynamics
Chronic   - continue home hydralazine, clonidine, metoprolol, losartan with hold parameters   - monitor hemodynamics

## 2023-01-19 NOTE — PROGRESS NOTE ADULT - PROBLEM SELECTOR PLAN 6
Chronic   - continue home bactrim, azathioprine, and tacrolimus [doses confirmed with patient and pharmacy]  - nephrology consulted Dr Bryant    #anemia  - suspect 2/2 CKD  - no clinical evidence of blood loss

## 2023-01-19 NOTE — DIETITIAN INITIAL EVALUATION ADULT - NSICDXPASTMEDICALHX_GEN_ALL_CORE_FT
PAST MEDICAL HISTORY:  CKD (chronic kidney disease) Renal Transplant 2013 at [facility]    CVA (cerebral vascular accident) Wallenberg Stroke  low L body sensation  low R face sensation  decreased peripheral vision  poor balance  2015      Diabetes mellitus Type 1 on insulin pump    Diabetic peripheral neuropathy     History of DVT (deep vein thrombosis) LLE 2016, was on Eliquis x 6 months  Was hospitalized for Rhinovirus at the time, intubated    History of pleural effusion right thoracentesis 6/2022 and 8/2022    History of restrictive lung disease     Hyperlipidemia     Hypertension     Obesity (BMI 30-39.9)     CARMEN treated with BiPAP 2L O2 at night    Recurrent squamous cell carcinoma of skin nose, L arm    Squamous cell carcinoma of skin of left ear nose

## 2023-01-19 NOTE — DIETITIAN INITIAL EVALUATION ADULT - OTHER INFO
Pt is a 57-year-old male with PMHx of type 1 diabetes mellitus, CVA, recurrent right pleural effusion with VATS, renal transplant sent in from wound care for admission for right Charcot foot surgery with Dr. Catherine.   Visited pt this morning. Pt OOB to chair during visit today. Pt s/p OR yesterday (1/19) for exostectomy and tendon lengthening. Pt reports good appetite/intake of breakfast meal this am (>75% of meal consumed). Pt feeds self, no assistance needed at meal times. Denies chewing/swallowing difficulties. No food allergies. Denies N/V/D/C. +BM 1/17. Pt reports regular BMs at home. CBW on admission 160#? Inaccurate weight documented in EMR. Bedscale wt of 315# obtained yesterday. 1+ R foot edema noted. Pt reports UBW of 315#. Previous wt of 300# (5/2022). Pt reports some weight gain over the past few months 2/2 lack of physical activity/movement. Pt with hx of T1DM, A1c of 7.2% obtained. Pt uses insulin pump and CGM at home. Provided verbal and written DM diet education during visit today. Also provided verbal weight loss education. Pt receptive to information provided.

## 2023-01-19 NOTE — PROGRESS NOTE ADULT - ASSESSMENT
57-year-old male with PMHx of type 1 diabetes, CVA, recurrent right pleural effusion with VATS, renal transplant sent in from wound care for admission for right Charcot foot surgery with Dr. Catherine, cardiology consulted for optimization.    Cardiac Optimization  - s/p podiatric intervention, tolerated well from CV POV    - No acute changes on EKG compared to previous.  - No meaningful evidence of volume overload. No O2 requirement   - Previous TTE 7/22 showed normal LV function w/ no valvular disease. Recent stress test with no ischemia on 8/2022.    - BP , HR stable and controlled  - Continue home hydralazine, clonidine,  metoprolol, statin, ASA  - Monitor and replete Lytes. Keep K > 4 and Mg > 2    - Will continue to follow.    Flor Montes [facility]  Nurse Practitioner - Cardiology   Spectra #[id number]/ [phone]

## 2023-01-19 NOTE — DIETITIAN INITIAL EVALUATION ADULT - ORAL INTAKE PTA/DIET HISTORY
Pt reports good appetite/intake PTA. Typically consumes 2-3 meals/day. Pt states that he will occasionally skip breakfast meal (Lunch- dinner leftovers, Dinner- utilizes meal delivery service "[employer]" meals consistent of protein, starch, veg). Pt drinks water, club soda, or unsweetened ice tea. Pt states that he limits carbohydrate intake and added sugars. Does not carb count at home.

## 2023-01-19 NOTE — PROGRESS NOTE ADULT - PROBLEM SELECTOR PLAN 1
Type 1 A1c 7.2% adm diabetic charcot foot  c/w current pump settings  Recommend endocrine-Perlman onconsult  FU appt: TBA  DSC recommendations: return to home regimen and glucose monitoring  diabetes education provided as documented above  Diabetes support info and cell # [phone] given   Goal 100-180 mg/dL; 140-180 mg/dL in critical care areas

## 2023-01-19 NOTE — DIETITIAN INITIAL EVALUATION ADULT - PERTINENT LABORATORY DATA
01-19    139  |  105  |  24<H>  ----------------------------<  99  4.3   |  32<H>  |  1.20    Ca    8.7      19 Jan 2023 06:40    TPro  6.8  /  Alb  3.2<L>  /  TBili  0.5  /  DBili  x   /  AST  15  /  ALT  16  /  AlkPhos  86  01-18  POCT Blood Glucose.: 94 mg/dL (01-19-23 @ 07:44)  A1C with Estimated Average Glucose Result: 7.2 % (01-18-23 @ 06:41)  A1C with Estimated Average Glucose Result: 7.2 % (01-17-23 @ 11:55)  A1C with Estimated Average Glucose Result: 7.2 % (11-01-22 @ 15:00)

## 2023-01-19 NOTE — PROGRESS NOTE ADULT - SUBJECTIVE AND OBJECTIVE BOX
Patient is a 57y old  Male who presents with a chief complaint of Right Charcot Foot (18 Jan 2023 15:26)      Subjective:  INTERVAL HPI/OVERNIGHT EVENTS: Patient seen and examined at bedside on post-op day 1. No overnight events occurred. Patient states pain located at the right achilles tendon. Reports worsening with stretching and severity 2/10. Patient requested acetaminophen and states that pain has improved with medication and is well managed. Denies fevers, chills, dizziness, lightheadedness, chest pain, dyspnea, abdominal pain. Pt reports not having attempted walking out of bed since surgery, but reports willingness and capable to attempt today.         MEDICATIONS  (STANDING):  aspirin  chewable 81 milliGRAM(s) Oral daily  atorvastatin 10 milliGRAM(s) Oral at bedtime  ceFAZolin   IVPB 2000 milliGRAM(s) IV Intermittent every 8 hours  cloNIDine 0.1 milliGRAM(s) Oral two times a day  dextrose 5%. 1000 milliLiter(s) (50 mL/Hr) IV Continuous <Continuous>  dextrose 5%. 1000 milliLiter(s) (100 mL/Hr) IV Continuous <Continuous>  dextrose 5%. 1000 milliLiter(s) (100 mL/Hr) IV Continuous <Continuous>  dextrose 50% Injectable 25 Gram(s) IV Push once  dextrose 50% Injectable 12.5 Gram(s) IV Push once  dextrose 50% Injectable 25 Gram(s) IV Push once  enoxaparin Injectable 40 milliGRAM(s) SubCutaneous every 24 hours  gabapentin 300 milliGRAM(s) Oral two times a day  glucagon  Injectable 1 milliGRAM(s) IntraMuscular once  hydrALAZINE 100 milliGRAM(s) Oral three times a day  insulin lispro (HumaLOG) Pump 1 Each SubCutaneous Continuous Pump  melatonin 5 milliGRAM(s) Oral at bedtime  metoprolol tartrate 50 milliGRAM(s) Oral two times a day  tacrolimus 1.5 milliGRAM(s) Oral <User Schedule>    MEDICATIONS  (PRN):  acetaminophen     Tablet .. 650 milliGRAM(s) Oral every 6 hours PRN Temp greater or equal to 38C (100.4F), Mild Pain (1 - 3)  dextrose Oral Gel 15 Gram(s) Oral once PRN Blood Glucose LESS THAN 70 milliGRAM(s)/deciliter      Allergies    No Known Allergies    Intolerances        REVIEW OF SYSTEMS:  CONSTITUTIONAL: No fever or chills  RESPIRATORY: No shortness of breath  CARDIOVASCULAR: No chest pain  NEUROLOGICAL: No dizziness or fatigue  MUSCULOSKELETAL: + pain at right achilles tendon    Objective:  Vital Signs Last 24 Hrs  T(C): 36.7 (19 Jan 2023 05:26), Max: 37.1 (18 Jan 2023 09:41)  T(F): 98.1 (19 Jan 2023 05:26), Max: 98.8 (18 Jan 2023 09:41)  HR: 73 (19 Jan 2023 05:26) (63 - 80)  BP: 141/69 (19 Jan 2023 05:26) (116/67 - 168/77)  BP(mean): --  RR: 18 (19 Jan 2023 05:26) (13 - 19)  SpO2: 96% (19 Jan 2023 05:26) (93% - 99%)    Parameters below as of 18 Jan 2023 20:50  Patient On (Oxygen Delivery Method): nasal cannula        GENERAL: NAD, lying in bed comfortably  NECK: Supple, No JVD  CHEST/LUNG: Clear to auscultation bilaterally; No rales, rhonchi, wheezing, or rubs. Unlabored respirations  HEART: Regular rate and rhythm; No murmurs, rubs, or gallops  NERVOUS SYSTEM:  Alert & Oriented X3, speech clear. No deficits   MSK: Lower extremity, surgical site of right foot is clean, exposed foot is non-erythematous, mild edema     LABS:                        11.6   8.41  )-----------( 224      ( 19 Jan 2023 06:40 )             36.6     CBC Full  -  ( 19 Jan 2023 06:40 )  WBC Count : 8.41 K/uL  Hemoglobin : 11.6 g/dL  Hematocrit : 36.6 %  Platelet Count - Automated : 224 K/uL  Mean Cell Volume : 88.4 fl  Mean Cell Hemoglobin : 28.0 pg  Mean Cell Hemoglobin Concentration : 31.7 gm/dL  Auto Neutrophil # : 6.43 K/uL  Auto Lymphocyte # : 0.88 K/uL  Auto Monocyte # : 0.81 K/uL  Auto Eosinophil # : 0.20 K/uL  Auto Basophil # : 0.05 K/uL  Auto Neutrophil % : 76.4 %  Auto Lymphocyte % : 10.5 %  Auto Monocyte % : 9.6 %  Auto Eosinophil % : 2.4 %  Auto Basophil % : 0.6 %    19 Jan 2023 06:40    139    |  105    |  24     ----------------------------<  99     4.3     |  32     |  1.20     Ca    8.7        19 Jan 2023 06:40      PT/INR - ( 18 Jan 2023 06:41 )   PT: 13.0 sec;   INR: 1.11 ratio         PTT - ( 17 Jan 2023 11:55 )  PTT:30.2 sec    CAPILLARY BLOOD GLUCOSE      POCT Blood Glucose.: 94 mg/dL (19 Jan 2023 07:44)  POCT Blood Glucose.: 212 mg/dL (18 Jan 2023 21:25)  POCT Blood Glucose.: 159 mg/dL (18 Jan 2023 18:22)  POCT Blood Glucose.: 130 mg/dL (18 Jan 2023 15:37)  POCT Blood Glucose.: 118 mg/dL (18 Jan 2023 14:35)  POCT Blood Glucose.: 91 mg/dL (18 Jan 2023 12:35)  POCT Blood Glucose.: 87 mg/dL (18 Jan 2023 10:33)        Culture - Blood (collected 01-17-23 @ 11:55)  Source: .Blood Blood  Preliminary Report (01-18-23 @ 18:01):    No growth to date.    Culture - Blood (collected 01-17-23 @ 11:30)  Source: .Blood Blood  Preliminary Report (01-18-23 @ 18:01):    No growth to date.        RADIOLOGY & ADDITIONAL TESTS:    Personally reviewed.     Consultant(s) Notes Reviewed:  [x] YES  [ ] NO

## 2023-01-19 NOTE — PROGRESS NOTE ADULT - SUBJECTIVE AND OBJECTIVE BOX
Patient is a 57y old  Male who presents with a chief complaint of Right Charcot Foot (2023 08:58)    Updates: Type 1 DM, A1c 7.2, pt POD #1 Charcot foot repair, off dextrose IVF, now tolerating CC diet, OOB w/ walker. pt reports no issues overnight, F/S 130>159>212>94    pt replaced dexcom g6 sensor to LRQ abdomen yesterday, due to change insulin pump insertion site today, discussed importance of changing insertion site location q72 hours to prevent tissue scarring or infusion complications, pt states he will change in afternoon, discussed using humalog insulin from pharmacy.     reviewed carb counting and insulin matching, discussed calculating ISF and I:C ratio, importance of consistent carb intake and balanced meals    HPI:  57-year-old male with PMHx of type 1 diabetes mellitus, CVA, recurrent right pleural effusion with VATS, renal transplant sent in from wound care for admission for right Charcot foot surgery with Dr. Catherine. Patient last seen at wound care with Dr. Catherine on . Patient is status post bone biopsies which are negative for osteomyelitis at this time. Sent into the hospital by Dr. Catherine for right peroneus brevis tendon detachment with fifth metatarsal base and exostectomy and reattachment of the peroneus brevis tendon, right cuboid plantar exostectomy, right tendon Achilles lengthening, posterior tibialis tendon lengthening, and right fifth digit proximal phalanx base resection. Patient with no complaints at this time.     ED course  Vitals: T 97.7, HR 64, /73, RR 16, SpO2 97% on RA  Significant labs: ESR 21, H 12.6, BUN 28   CXR shows rt sided pleural effusion; unchanged from previous  Xray Foot AP pending official read   EKst degree AV block, rate 66 bpm  In ED patient given: Vanc 1g x1, Zosyn 3.375g x1     (2023 14:03)      PAST MEDICAL & SURGICAL HISTORY:  Hypertension      Hyperlipidemia      Diabetes mellitus  Type 1 on insulin pump      CKD (chronic kidney disease)  Renal Transplant  at [facility]      Diabetic peripheral neuropathy      Obesity (BMI 30-39.9)      CVA (cerebral vascular accident)  Wallenberg Stroke  low L body sensation  low R face sensation  decreased peripheral vision  poor balance          Squamous cell carcinoma of skin of left ear  nose      History of DVT (deep vein thrombosis)  LLE , was on Eliquis x 6 months  Was hospitalized for Rhinovirus at the time, intubated      Recurrent squamous cell carcinoma of skin  nose, L arm      History of pleural effusion  right thoracentesis 2022 and 2022      CARMEN treated with BiPAP  2L O2 at night      History of restrictive lung disease      Toe infection   L 4th Toe surgery-amputation secondary to osteomyelitis  2019 partial right 2nd 4th toe amputation      Ear disease  Left inner ear surgery, 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1 milliGRAM(s) Oral two times a day  dextrose 5%. 1000 milliLiter(s) (50 mL/Hr) IV Continuous <Continuous>  dextrose 5%. 1000 milliLiter(s) (100 mL/Hr) IV Continuous <Continuous>  dextrose 5%. 1000 milliLiter(s) (100 mL/Hr) IV Continuous <Continuous>  dextrose 50% Injectable 25 Gram(s) IV Push once  dextrose 50% Injectable 12.5 Gram(s) IV Push once  dextrose 50% Injectable 25 Gram(s) IV Push once  enoxaparin Injectable 40 milliGRAM(s) SubCutaneous every 24 hours  gabapentin 300 milliGRAM(s) Oral two times a day  glucagon  Injectable 1 milliGRAM(s) IntraMuscular once  hydrALAZINE 100 milliGRAM(s) Oral three times a day  insulin lispro (HumaLOG) Pump 1 Each SubCutaneous Continuous Pump  melatonin 5 milliGRAM(s) Oral at bedtime  metoprolol tartrate 50 milliGRAM(s) Oral two times a day  tacrolimus 1.5 milliGRAM(s) Oral <User Schedule>

## 2023-01-19 NOTE — PROGRESS NOTE ADULT - PROBLEM SELECTOR PLAN 2
Chronic   - as patient was hypoglycemic overnight and required D50x2 and was placed on D5 100cc/hr drip pre-op   - Will monitor glucose levels post-op when PO diet resumes   - pt to use home insulin pump with home settings - diabetes education following and will assist w/ pump adjustments (with a focus on the perio-op time period)  - patient has continuous glucose monitor - will moniotr fingersticks per protocol Chronic   - as patient was hypoglycemic overnight and required D50x2 and was placed on D5 100cc/hr drip pre-op   - Will monitor glucose levels post-op when PO diet resumes   - pt to use home insulin pump with home settings per diabetic NP/Endo   - patient has continuous glucose monitor - will monitor fingersticks per protocol Chronic   Resume PO diet today, AM fingerstick was 94.  - Will continue to monitor glucose levels   - pt to use home insulin pump with home settings per diabetic NP/Endo   - patient has continuous glucose monitor - will monitor fingersticks per protocol  - Endocrinology following

## 2023-01-19 NOTE — PROGRESS NOTE ADULT - PROBLEM SELECTOR PLAN 1
Patient examined and evaluated.  Surgical site dressed with adaptic and dry, sterile dressing.  Patient is to be non weight bearing to the right lower extremity at this time.  Surgical pathology pending at this time.  Patient is to keep dressings clean, dry, and intact upon discharge. Patient is to follow up at the [facility] within 5 days upon discharge.

## 2023-01-19 NOTE — PROGRESS NOTE ADULT - ASSESSMENT
CKD 3, h/o Kidney transplant 2013 L[facility]  Diabetes, Diabetic foot ulcer, Charcots foot, s/p surgery 01/18 (Lengthening, Achilles tendon, Posterior tibial tendon lengthening)  Hypertension    Stable renal function. To continue current meds. Continue prograf and azathioprine. Prograf levels good.   Monitor blood sugar levels. Insulin coverage as needed. Monitor BP trend. Titrate BP meds as needed. Salt restriction.   Podiatry follow up. Will follow electrolytes and renal function trend.

## 2023-01-19 NOTE — DIETITIAN INITIAL EVALUATION ADULT - PERTINENT MEDS FT
MEDICATIONS  (STANDING):  aspirin  chewable 81 milliGRAM(s) Oral daily  atorvastatin 10 milliGRAM(s) Oral at bedtime  azaTHIOprine 100 milliGRAM(s) Oral daily  ceFAZolin   IVPB 2000 milliGRAM(s) IV Intermittent every 8 hours  cloNIDine 0.1 milliGRAM(s) Oral two times a day  dextrose 5%. 1000 milliLiter(s) (50 mL/Hr) IV Continuous <Continuous>  dextrose 5%. 1000 milliLiter(s) (100 mL/Hr) IV Continuous <Continuous>  dextrose 5%. 1000 milliLiter(s) (100 mL/Hr) IV Continuous <Continuous>  dextrose 50% Injectable 25 Gram(s) IV Push once  dextrose 50% Injectable 12.5 Gram(s) IV Push once  dextrose 50% Injectable 25 Gram(s) IV Push once  enoxaparin Injectable 40 milliGRAM(s) SubCutaneous every 24 hours  gabapentin 300 milliGRAM(s) Oral two times a day  glucagon  Injectable 1 milliGRAM(s) IntraMuscular once  hydrALAZINE 100 milliGRAM(s) Oral three times a day  insulin lispro (HumaLOG) Pump 1 Each SubCutaneous Continuous Pump  melatonin 5 milliGRAM(s) Oral at bedtime  metoprolol tartrate 50 milliGRAM(s) Oral two times a day  tacrolimus 1.5 milliGRAM(s) Oral <User Schedule>    MEDICATIONS  (PRN):  acetaminophen     Tablet .. 650 milliGRAM(s) Oral every 6 hours PRN Temp greater or equal to 38C (100.4F), Mild Pain (1 - 3)  dextrose Oral Gel 15 Gram(s) Oral once PRN Blood Glucose LESS THAN 70 milliGRAM(s)/deciliter

## 2023-01-19 NOTE — PATIENT CHOICE NOTE. - NSPTCHOICESTATE_GEN_ALL_CORE

## 2023-01-19 NOTE — PROGRESS NOTE ADULT - PROBLEM SELECTOR PLAN 3
Chronic   - pt s/p VATS procedure  - outpatient management Chronic, asymptomatic   - pt s/p VATS procedure  - outpatient management

## 2023-01-19 NOTE — DIETITIAN INITIAL EVALUATION ADULT - NS FNS DIET ORDER
Diet, DASH/TLC:   Sodium & Cholesterol Restricted  Consistent Carbohydrate {Evening Snack} (01-18-23 @ 18:30)

## 2023-01-19 NOTE — PROGRESS NOTE ADULT - PROBLEM SELECTOR PLAN 1
Chronic, s/p amputation of tips of 2nd and 3rd digits of left foot   -Post-op day 1, tolerated proceure well  -Pt has not walked since surgery, but is willing and capable to try today, will monitor ambulation   - PT consult?  - F/u surgical pathology   - Pain control PRN   - Wound care per podiatry recs   - Podiatry following Chronic, s/p amputation of tips of 2nd and 3rd digits of left foot   -Post-op day 1, tolerated procedure well  -Pt has not walked since surgery, but is willing and capable to try today, will monitor ambulation   - PT consulted,  f/u recs.  - F/u surgical pathology   - Pain control PRN   - Wound care per podiatry recs   - Podiatry following. Currently non-weight bearing status on R leg

## 2023-01-19 NOTE — PROGRESS NOTE ADULT - ASSESSMENT
Right foot charcot deformity s/p right achilles tendon lengthening, right posterior tibial tendon lengthening, right peroneal tendon detachment and reattachment with exostectomy of the 5th metatarsal base and cuboid, and right 5th digit proximal phalanx base resection    Right foot ulcers down to skin, subcutaneous tissue, fat

## 2023-01-19 NOTE — PROGRESS NOTE ADULT - ASSESSMENT
Physical Exam:   Vital Signs Last 24 Hrs  T(C): 36.7 (19 Jan 2023 05:26), Max: 37.1 (18 Jan 2023 11:12)  T(F): 98.1 (19 Jan 2023 05:26), Max: 98.8 (18 Jan 2023 11:12)  HR: 73 (19 Jan 2023 05:26) (63 - 80)  BP: 141/69 (19 Jan 2023 05:26) (116/67 - 168/77)  BP(mean): --  RR: 18 (19 Jan 2023 05:26) (13 - 19)  SpO2: 96% (19 Jan 2023 05:26) (93% - 99%)    Parameters below as of 18 Jan 2023 20:50  Patient On (Oxygen Delivery Method): nasal cannula       CAPILLARY BLOOD GLUCOSE      POCT Blood Glucose.: 94 mg/dL (19 Jan 2023 07:44)  POCT Blood Glucose.: 212 mg/dL (18 Jan 2023 21:25)  POCT Blood Glucose.: 159 mg/dL (18 Jan 2023 18:22)  POCT Blood Glucose.: 130 mg/dL (18 Jan 2023 15:37)  POCT Blood Glucose.: 118 mg/dL (18 Jan 2023 14:35)  POCT Blood Glucose.: 91 mg/dL (18 Jan 2023 12:35)  POCT Blood Glucose.: 87 mg/dL (18 Jan 2023 10:33)      Cholesterol, Serum: 113 mg/dL (05.19.21 @ 08:36)     HDL Cholesterol, Serum: 22 mg/dL (05.19.21 @ 08:36)     LDL Cholesterol Calculated: 66 mg/dL (05.19.21 @ 08:36)     DIET: CC  >50%

## 2023-01-19 NOTE — PROGRESS NOTE ADULT - PROBLEM SELECTOR PLAN 7
Lovenox given on 1/17/23 but now on hold until post-op (once cleared by podiatry)  Currently has SCDs Lovenox 40mg qd for VTE ppx

## 2023-01-19 NOTE — PROGRESS NOTE ADULT - SUBJECTIVE AND OBJECTIVE BOX
57y year old Male seen at [facility] [address] W1 s/p right achilles tendon lengthening, right posterior tibial tendon lengthening, right peroneal tendon detachment and reattachment with exostectomy of the 5th metatarsal base and cuboid, and right 5th digit proximal phalanx base resection (DOS: 1/18/23). Patient relates no overnight events and states that they are doing well at this time.  Denies any fever, chills, nausea, vomiting, chest pain, shortness of breath, or calf pain at this time.    Allergies    No Known Allergies    Intolerances        MEDICATIONS  (STANDING):  aspirin  chewable 81 milliGRAM(s) Oral daily  atorvastatin 10 milliGRAM(s) Oral at bedtime  azaTHIOprine 100 milliGRAM(s) Oral daily  ceFAZolin   IVPB 2000 milliGRAM(s) IV Intermittent every 8 hours  cloNIDine 0.1 milliGRAM(s) Oral two times a day  dextrose 5%. 1000 milliLiter(s) (50 mL/Hr) IV Continuous <Continuous>  dextrose 5%. 1000 milliLiter(s) (100 mL/Hr) IV Continuous <Continuous>  dextrose 5%. 1000 milliLiter(s) (100 mL/Hr) IV Continuous <Continuous>  dextrose 50% Injectable 25 Gram(s) IV Push once  dextrose 50% Injectable 12.5 Gram(s) IV Push once  dextrose 50% Injectable 25 Gram(s) IV Push once  enoxaparin Injectable 40 milliGRAM(s) SubCutaneous every 24 hours  gabapentin 300 milliGRAM(s) Oral two times a day  glucagon  Injectable 1 milliGRAM(s) IntraMuscular once  hydrALAZINE 100 milliGRAM(s) Oral three times a day  insulin lispro (HumaLOG) Pump 1 Each SubCutaneous Continuous Pump  melatonin 5 milliGRAM(s) Oral at bedtime  metoprolol tartrate 50 milliGRAM(s) Oral two times a day  tacrolimus 1.5 milliGRAM(s) Oral <User Schedule>    MEDICATIONS  (PRN):  acetaminophen     Tablet .. 650 milliGRAM(s) Oral every 6 hours PRN Temp greater or equal to 38C (100.4F), Mild Pain (1 - 3)  dextrose Oral Gel 15 Gram(s) Oral once PRN Blood Glucose LESS THAN 70 milliGRAM(s)/deciliter      Vital Signs Last 24 Hrs  T(C): 36.8 (19 Jan 2023 12:50), Max: 36.8 (19 Jan 2023 12:50)  T(F): 98.3 (19 Jan 2023 12:50), Max: 98.3 (19 Jan 2023 12:50)  HR: 77 (19 Jan 2023 17:30) (65 - 77)  BP: 162/73 (19 Jan 2023 17:30) (129/64 - 168/77)  BP(mean): --  RR: 18 (19 Jan 2023 17:30) (18 - 18)  SpO2: 95% (19 Jan 2023 17:30) (93% - 96%)    Parameters below as of 19 Jan 2023 12:50  Patient On (Oxygen Delivery Method): room air        PHYSICAL EXAM:  Vascular: DP & PT palpable bilaterally, Capillary refill 3 seconds, Digital hair not present bilaterally  Neurological: Light touch sensation not intact bilaterally  Musculoskeletal: s/p right achilles tendon lengthening, right posterior tibial tendon lengthening, right peroneal tendon detachment and reattachment with exostectomy of the 5th metatarsal base and cuboid, and right 5th digit proximal phalanx base resection  Dermatological: Incision sites of the right foot noted with intact sutures, no dehiscence, mild phil-incision erythema, no proximal streaking, no fluctuance, no malodor, no signs of infection at this time. Right plantar lateral midfoot and lateral 5th digit ulcer down to skin, subcutaneous tissue, and fat.                          11.6   8.41  )-----------( 224      ( 19 Jan 2023 06:40 )             36.6       01-19    139  |  105  |  24<H>  ----------------------------<  99  4.3   |  32<H>  |  1.20    Ca    8.7      19 Jan 2023 06:40    TPro  6.8  /  Alb  3.2<L>  /  TBili  0.5  /  DBili  x   /  AST  15  /  ALT  16  /  AlkPhos  86  01-18      PT/INR - ( 18 Jan 2023 06:41 )   PT: 13.0 sec;   INR: 1.11 ratio               Culture - Blood (collected 17 Jan 2023 11:55)  Source: .Blood Blood  Preliminary Report (18 Jan 2023 18:01):    No growth to date.    Culture - Blood (collected 17 Jan 2023 11:30)  Source: .Blood Blood  Preliminary Report (18 Jan 2023 18:01):    No growth to date.        Imaging: s/p right foot 5th metatarsal and cuboid exostectomy and 5th digit proximal phalanx base resection

## 2023-01-19 NOTE — DIETITIAN INITIAL EVALUATION ADULT - ADD RECOMMEND
1) Continue current diet order; consistent carbohydrate, DASH/TLC diet  2) Monitor po intake, diet tolerance, weight trends, labs, GI function, skin integrity

## 2023-01-20 ENCOUNTER — TRANSCRIPTION ENCOUNTER (OUTPATIENT)
Age: 58
End: 2023-01-20

## 2023-01-20 VITALS — HEART RATE: 75 BPM | DIASTOLIC BLOOD PRESSURE: 70 MMHG | SYSTOLIC BLOOD PRESSURE: 150 MMHG

## 2023-01-20 LAB
ANION GAP SERPL CALC-SCNC: 4 MMOL/L — LOW (ref 5–17)
BASOPHILS # BLD AUTO: 0.04 K/UL — SIGNIFICANT CHANGE UP (ref 0–0.2)
BASOPHILS NFR BLD AUTO: 0.5 % — SIGNIFICANT CHANGE UP (ref 0–2)
BUN SERPL-MCNC: 28 MG/DL — HIGH (ref 7–23)
CALCIUM SERPL-MCNC: 8.9 MG/DL — SIGNIFICANT CHANGE UP (ref 8.5–10.1)
CHLORIDE SERPL-SCNC: 103 MMOL/L — SIGNIFICANT CHANGE UP (ref 96–108)
CO2 SERPL-SCNC: 31 MMOL/L — SIGNIFICANT CHANGE UP (ref 22–31)
CREAT SERPL-MCNC: 1.3 MG/DL — SIGNIFICANT CHANGE UP (ref 0.5–1.3)
EGFR: 64 ML/MIN/1.73M2 — SIGNIFICANT CHANGE UP
EOSINOPHIL # BLD AUTO: 0.23 K/UL — SIGNIFICANT CHANGE UP (ref 0–0.5)
EOSINOPHIL NFR BLD AUTO: 3.1 % — SIGNIFICANT CHANGE UP (ref 0–6)
GLUCOSE SERPL-MCNC: 155 MG/DL — HIGH (ref 70–99)
HCT VFR BLD CALC: 34.1 % — LOW (ref 39–50)
HGB BLD-MCNC: 10.6 G/DL — LOW (ref 13–17)
IMM GRANULOCYTES NFR BLD AUTO: 0.4 % — SIGNIFICANT CHANGE UP (ref 0–0.9)
LYMPHOCYTES # BLD AUTO: 1.09 K/UL — SIGNIFICANT CHANGE UP (ref 1–3.3)
LYMPHOCYTES # BLD AUTO: 14.7 % — SIGNIFICANT CHANGE UP (ref 13–44)
MCHC RBC-ENTMCNC: 27.8 PG — SIGNIFICANT CHANGE UP (ref 27–34)
MCHC RBC-ENTMCNC: 31.1 GM/DL — LOW (ref 32–36)
MCV RBC AUTO: 89.5 FL — SIGNIFICANT CHANGE UP (ref 80–100)
MONOCYTES # BLD AUTO: 0.73 K/UL — SIGNIFICANT CHANGE UP (ref 0–0.9)
MONOCYTES NFR BLD AUTO: 9.9 % — SIGNIFICANT CHANGE UP (ref 2–14)
NEUTROPHILS # BLD AUTO: 5.27 K/UL — SIGNIFICANT CHANGE UP (ref 1.8–7.4)
NEUTROPHILS NFR BLD AUTO: 71.4 % — SIGNIFICANT CHANGE UP (ref 43–77)
NRBC # BLD: 0 /100 WBCS — SIGNIFICANT CHANGE UP (ref 0–0)
PLATELET # BLD AUTO: 195 K/UL — SIGNIFICANT CHANGE UP (ref 150–400)
POTASSIUM SERPL-MCNC: 4.5 MMOL/L — SIGNIFICANT CHANGE UP (ref 3.5–5.3)
POTASSIUM SERPL-SCNC: 4.5 MMOL/L — SIGNIFICANT CHANGE UP (ref 3.5–5.3)
RBC # BLD: 3.81 M/UL — LOW (ref 4.2–5.8)
RBC # FLD: 14.5 % — SIGNIFICANT CHANGE UP (ref 10.3–14.5)
SODIUM SERPL-SCNC: 138 MMOL/L — SIGNIFICANT CHANGE UP (ref 135–145)
WBC # BLD: 7.39 K/UL — SIGNIFICANT CHANGE UP (ref 3.8–10.5)
WBC # FLD AUTO: 7.39 K/UL — SIGNIFICANT CHANGE UP (ref 3.8–10.5)

## 2023-01-20 PROCEDURE — 82962 GLUCOSE BLOOD TEST: CPT

## 2023-01-20 PROCEDURE — 36415 COLL VENOUS BLD VENIPUNCTURE: CPT

## 2023-01-20 PROCEDURE — 86140 C-REACTIVE PROTEIN: CPT

## 2023-01-20 PROCEDURE — 85025 COMPLETE CBC W/AUTO DIFF WBC: CPT

## 2023-01-20 PROCEDURE — 88311 DECALCIFY TISSUE: CPT

## 2023-01-20 PROCEDURE — 85610 PROTHROMBIN TIME: CPT

## 2023-01-20 PROCEDURE — 85652 RBC SED RATE AUTOMATED: CPT

## 2023-01-20 PROCEDURE — 80197 ASSAY OF TACROLIMUS: CPT

## 2023-01-20 PROCEDURE — 99285 EMERGENCY DEPT VISIT HI MDM: CPT | Mod: 25

## 2023-01-20 PROCEDURE — 83605 ASSAY OF LACTIC ACID: CPT

## 2023-01-20 PROCEDURE — 99239 HOSP IP/OBS DSCHRG MGMT >30: CPT

## 2023-01-20 PROCEDURE — 86900 BLOOD TYPING SEROLOGIC ABO: CPT

## 2023-01-20 PROCEDURE — 97116 GAIT TRAINING THERAPY: CPT

## 2023-01-20 PROCEDURE — 87635 SARS-COV-2 COVID-19 AMP PRB: CPT

## 2023-01-20 PROCEDURE — 85730 THROMBOPLASTIN TIME PARTIAL: CPT

## 2023-01-20 PROCEDURE — 71045 X-RAY EXAM CHEST 1 VIEW: CPT

## 2023-01-20 PROCEDURE — 99232 SBSQ HOSP IP/OBS MODERATE 35: CPT

## 2023-01-20 PROCEDURE — 86901 BLOOD TYPING SEROLOGIC RH(D): CPT

## 2023-01-20 PROCEDURE — 87040 BLOOD CULTURE FOR BACTERIA: CPT

## 2023-01-20 PROCEDURE — 96374 THER/PROPH/DIAG INJ IV PUSH: CPT

## 2023-01-20 PROCEDURE — 97112 NEUROMUSCULAR REEDUCATION: CPT

## 2023-01-20 PROCEDURE — 97162 PT EVAL MOD COMPLEX 30 MIN: CPT

## 2023-01-20 PROCEDURE — 96375 TX/PRO/DX INJ NEW DRUG ADDON: CPT

## 2023-01-20 PROCEDURE — 86850 RBC ANTIBODY SCREEN: CPT

## 2023-01-20 PROCEDURE — 73630 X-RAY EXAM OF FOOT: CPT

## 2023-01-20 PROCEDURE — 97110 THERAPEUTIC EXERCISES: CPT

## 2023-01-20 PROCEDURE — C1713: CPT

## 2023-01-20 PROCEDURE — 80053 COMPREHEN METABOLIC PANEL: CPT

## 2023-01-20 PROCEDURE — 93005 ELECTROCARDIOGRAM TRACING: CPT

## 2023-01-20 PROCEDURE — 88304 TISSUE EXAM BY PATHOLOGIST: CPT

## 2023-01-20 PROCEDURE — 83036 HEMOGLOBIN GLYCOSYLATED A1C: CPT

## 2023-01-20 PROCEDURE — 80048 BASIC METABOLIC PNL TOTAL CA: CPT

## 2023-01-20 RX ORDER — INSULIN LISPRO 100/ML
1 VIAL (ML) SUBCUTANEOUS
Refills: 0 | Status: DISCONTINUED | OUTPATIENT
Start: 2023-01-20 | End: 2023-01-20

## 2023-01-20 RX ADMIN — GABAPENTIN 300 MILLIGRAM(S): 400 CAPSULE ORAL at 06:06

## 2023-01-20 RX ADMIN — Medication 100 MILLIGRAM(S): at 06:06

## 2023-01-20 RX ADMIN — Medication 0.1 MILLIGRAM(S): at 06:07

## 2023-01-20 RX ADMIN — Medication 100 MILLIGRAM(S): at 13:39

## 2023-01-20 RX ADMIN — Medication 100 MILLIGRAM(S): at 06:07

## 2023-01-20 RX ADMIN — AZATHIOPRINE 100 MILLIGRAM(S): 100 TABLET ORAL at 11:32

## 2023-01-20 RX ADMIN — Medication 1 EACH: at 07:45

## 2023-01-20 RX ADMIN — Medication 81 MILLIGRAM(S): at 11:32

## 2023-01-20 RX ADMIN — Medication 100 MILLIGRAM(S): at 13:40

## 2023-01-20 RX ADMIN — ENOXAPARIN SODIUM 40 MILLIGRAM(S): 100 INJECTION SUBCUTANEOUS at 08:49

## 2023-01-20 RX ADMIN — Medication 50 MILLIGRAM(S): at 06:07

## 2023-01-20 RX ADMIN — TACROLIMUS 1.5 MILLIGRAM(S): 5 CAPSULE ORAL at 09:47

## 2023-01-20 NOTE — PROGRESS NOTE ADULT - SUBJECTIVE AND OBJECTIVE BOX
CAPILLARY BLOOD GLUCOSE      POCT Blood Glucose.: 178 mg/dL (20 Jan 2023 07:43)  POCT Blood Glucose.: 145 mg/dL (19 Jan 2023 21:22)  POCT Blood Glucose.: 187 mg/dL (19 Jan 2023 16:50)  POCT Blood Glucose.: 125 mg/dL (19 Jan 2023 11:19)      Vital Signs Last 24 Hrs  T(C): 36.3 (20 Jan 2023 05:25), Max: 36.8 (19 Jan 2023 12:50)  T(F): 97.4 (20 Jan 2023 05:25), Max: 98.3 (19 Jan 2023 12:50)  HR: 79 (20 Jan 2023 05:25) (69 - 79)  BP: 147/65 (20 Jan 2023 05:25) (129/64 - 162/73)  BP(mean): --  RR: 18 (20 Jan 2023 05:25) (18 - 18)  SpO2: 93% (20 Jan 2023 05:25) (92% - 96%)    Parameters below as of 20 Jan 2023 05:25  Patient On (Oxygen Delivery Method): room air        Respiratory: CTA B/L  CV: RRR, S1S2, no murmurs, rubs or gallops  Abdominal: Soft, NT, ND +BS, Last BM  Extremities: No edema, + peripheral pulses     01-20    138  |  103  |  28<H>  ----------------------------<  155<H>  4.5   |  31  |  1.30    Ca    8.9      20 Jan 2023 06:25        atorvastatin 10 milliGRAM(s) Oral at bedtime  dextrose 50% Injectable 25 Gram(s) IV Push once  dextrose 50% Injectable 12.5 Gram(s) IV Push once  dextrose 50% Injectable 25 Gram(s) IV Push once  dextrose Oral Gel 15 Gram(s) Oral once PRN  glucagon  Injectable 1 milliGRAM(s) IntraMuscular once  insulin lispro (ADMELOG) Pump 1 Each SubCutaneous Continuous Pump

## 2023-01-20 NOTE — CONSULT NOTE ADULT - SUBJECTIVE AND OBJECTIVE BOX
[facility] DIVISION of INFECTIOUS DISEASE  Marko Saucedo MD PhD, Qiana Steven MD, Claire Castro MD, Tal Russo MD, David Bravo MD  and providing coverage with Krista Del Valle MD  Providing Infectious Disease Consultations at [facility], [facility], [place], [place], [facility], [facility]'s    Office# [phone] to schedule follow up appointments  Answering Service for urgent calls or New Consults [phone]  Cell# to text for urgent issues Marko Saucedo [phone]     HPI:  57-year-old male with PMHx of type 1 diabetes mellitus, CVA, recurrent right pleural effusion with VATS, renal transplant sent in from wound care for admission for right Charcot foot surgery with Dr. Catherine. Patient last seen at wound care with Dr. Catherine on 1/12. Patient is status post bone biopsies which are negative for osteomyelitis at this time. Sent into the hospital by Dr. Catherine for right peroneus brevis tendon detachment with fifth metatarsal base and exostectomy and reattachment of the peroneus brevis tendon, right cuboid plantar exostectomy, right tendon Achilles lengthening, posterior tibialis tendon lengthening, and right fifth digit proximal phalanx base resection. Patient with no complaints at this time.       PAST MEDICAL & SURGICAL HISTORY:  Hypertension      Hyperlipidemia      Diabetes mellitus  Type 1 on insulin pump      CKD (chronic kidney disease)  Renal Transplant 2013 at [facility]      Diabetic peripheral neuropathy      Obesity (BMI 30-39.9)      CVA (cerebral vascular accident)  [place] Stroke  low L body sensation  low R face sensation  decreased peripheral vision  poor balance  2015        Squamous cell carcinoma of skin of left ear  nose      History of DVT (deep vein thrombosis)  LLE 2016, was on Eliquis x 6 months  Was hospitalized for Rhinovirus at the time, intubated      Recurrent squamous cell carcinoma of skin  nose, L arm      History of pleural effusion  right thoracentesis 6/2022 and 8/2022      CARMEN treated with BiPAP  2L O2 at night      History of restrictive lung disease      Toe infection  2007 L 4th Toe surgery-amputation secondary to osteomyelitis  2019 partial right 2nd 4th toe amputation      Ear disease  Left inner ear surgery, 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. 650 milliGRAM(s) Oral every 6 hours PRN  aspirin  chewable 81 milliGRAM(s) Oral daily  atorvastatin 10 milliGRAM(s) Oral at bedtime  cloNIDine 0.1 milliGRAM(s) Oral two times a day  dextrose 5%. 1000 milliLiter(s) IV Continuous <Continuous>  dextrose 5%. 1000 milliLiter(s) IV Continuous <Continuous>  dextrose 5%. 1000 milliLiter(s) IV Continuous <Continuous>  dextrose 50% Injectable 25 Gram(s) IV Push once  dextrose 50% Injectable 12.5 Gram(s) IV Push once  dextrose 50% Injectable 25 Gram(s) IV Push once  dextrose Oral Gel 15 Gram(s) Oral once PRN  enoxaparin Injectable 40 milliGRAM(s) SubCutaneous every 24 hours  gabapentin 300 milliGRAM(s) Oral two times a day  glucagon  Injectable 1 milliGRAM(s) IntraMuscular once  hydrALAZINE 100 milliGRAM(s) Oral three times a day  insulin lispro (ADMELOG) Pump 1 Each SubCutaneous Continuous Pump  melatonin 5 milliGRAM(s) Oral at bedtime  metoprolol tartrate 50 milliGRAM(s) Oral two times a day      Allergies    No Known Allergies    Intolerances        SOCIAL HISTORY:  Social History:  Tobacco: Never smoker   EtOH:  Socially   Recreational drug use: Never user   Lives with: Wife   Ambulates: independently   ADLs: independently (17 Jan 2023 14:03)      FAMILY HISTORY:  Family history of diabetes mellitus (DM)    FH: skin cancer        ROS:    EYES:  Negative  blurry vision or double vision  GASTROINTESTINAL:  Negative for nausea, vomiting, diarrhea  -otherwise negative except for subjective    Vital Signs Last 24 Hrs  T(C): 36.3 (20 Jan 2023 05:25), Max: 36.8 (19 Jan 2023 12:50)  T(F): 97.4 (20 Jan 2023 05:25), Max: 98.3 (19 Jan 2023 12:50)  HR: 79 (20 Jan 2023 05:25) (69 - 79)  BP: 147/65 (20 Jan 2023 05:25) (129/64 - 162/73)  BP(mean): --  RR: 18 (20 Jan 2023 05:25) (18 - 18)  SpO2: 93% (20 Jan 2023 05:25) (92% - 96%)    Parameters below as of 20 Jan 2023 05:25  Patient On (Oxygen Delivery Method): room air        PE:  WDWN in no distress  HEENT:  NC, PERRL, sclerae anicteric, conjunctivae clear, EOMI.  Sinuses nontender, no nasal exudate.  No buccal or pharyngeal lesions, erythema or exudate  Neck:  Supple, no adenopathy  Lungs:  No accessory muscle use, breathing comfortably  Cor:  distant  Abd:  Symmetric, normoactive BS.  Soft, nontender, no masses, guarding or rebound.  Liver and spleen not enlarged  Extrem:  No cyanosis, right foot in cast  Neuro: grossly intact  Musc: moving all limbs freely, no focal deficits        LABS:                        10.6   7.39  )-----------( 195      ( 20 Jan 2023 06:25 )             34.1       WBC Count: 7.39 K/uL (01-20-23 @ 06:25)  WBC Count: 8.41 K/uL (01-19-23 @ 06:40)  WBC Count: 5.75 K/uL (01-18-23 @ 06:41)  WBC Count: 6.44 K/uL (01-17-23 @ 11:55)      01-20    138  |  103  |  28<H>  ----------------------------<  155<H>  4.5   |  31  |  1.30    Ca    8.9      20 Jan 2023 06:25        Creatinine, Serum: 1.30 mg/dL (01-20-23 @ 06:25)  Creatinine, Serum: 1.20 mg/dL (01-19-23 @ 06:40)  Creatinine, Serum: 1.30 mg/dL (01-18-23 @ 06:41)  Creatinine, Serum: 1.30 mg/dL (01-17-23 @ 11:55)                MICROBIOLOGY:      RADIOLOGY & ADDITIONAL STUDIES:    --

## 2023-01-20 NOTE — DISCHARGE NOTE PROVIDER - HOSPITAL COURSE
FROM ADMISSION H+P:   HPI:  57-year-old male with PMHx of type 1 diabetes mellitus, CVA, recurrent right pleural effusion with VATS, renal transplant sent in from wound care for admission for right Charcot foot surgery with Dr. Catherine. Patient last seen at wound care with Dr. Catherine on . Patient is status post bone biopsies which are negative for osteomyelitis at this time. Sent into the hospital by Dr. Catherine for right peroneus brevis tendon detachment with fifth metatarsal base and exostectomy and reattachment of the peroneus brevis tendon, right cuboid plantar exostectomy, right tendon Achilles lengthening, posterior tibialis tendon lengthening, and right fifth digit proximal phalanx base resection. Patient with no complaints at this time.     ED course  Vitals: T 97.7, HR 64, /73, RR 16, SpO2 97% on RA  Significant labs: ESR 21, H 12.6, BUN 28   CXR shows rt sided pleural effusion; unchanged from previous  Xray Foot AP pending official read   EKst degree AV block, rate 66 bpm  In ED patient given: Vanc 1g x1, Zosyn 3.375g x1     (2023 14:03)      ---  HOSPITAL COURSE:   Patient was admitted to medicine for phil-operative observation and management. As patient was NPO status, his glucose fluctuated and required IV dextrose. On 23, patient underwent podiatric intervention for charcot foot deformity with Dr. Castaneda. Patient tolerated the procedure well and recovered appropriately, with stabalization of glucose levels. The patient was medically stable for discharge with close podiatric follow up at the wound care center.      ---  CONSULTANTS:   Podiatry - Florin   nephrology - Manny   Cardiology -  Russo  Endocrinology -Perlman    --  DAY OF DISCHARGE   VITALS/PE   T(C): 36.3 (23 @ 05:25), Max: 36.8 (23 @ 12:50)  HR: 79 (23 @ 05:25) (69 - 79)  BP: 147/65 (23 @ 05:25) (129/64 - 162/73)  RR: 18 (23 @ 05:25) (18 - 18)  SpO2: 93% (01-20-23 @ 05:25) (92% - 96%)    CONSTITUTIONAL: Well groomed, no apparent distress  EYES: PERRLA and symmetric, EOMI  ENMT: Oral mucosa with moist membranes. Normal dentition  RESP: No respiratory distress, no use of accessory muscles; CTA b/l, no WRR  CV: RRR, +S1S2, no MRG; no JVD; no peripheral edema  GI: Soft, NT, ND, no rebound, no guarding;  MSK: Patient is non-weight bearing on R leg. B/l Upper extremity strenght is symmetric   NEURO: CN II-XII intact;   PSYCH: Appropriate insight/judgment; A+O x 3, mood and affect appropriate, recent/remote memory intact      ---  TIME SPENT:  The total amount of time spent reviewing the hospital notes, laboratory values, imaging findings, assessing/counseling the patient, discussing with consultant physicians, social work, nursing staff was -- minutes    ---  Primary care provider was made aware of plan for discharge:      [  ] NO     [  ] YES   FROM ADMISSION H+P:   HPI:  57-year-old male with PMHx of type 1 diabetes mellitus, CVA, recurrent right pleural effusion with VATS, renal transplant sent in from wound care for admission for right Charcot foot surgery with Dr. Catherine. Patient last seen at wound care with Dr. Catherine on 1/12. Patient is status post bone biopsies which are negative for osteomyelitis at this time. Sent into the hospital by Dr. Catherine for right peroneus brevis tendon detachment with fifth metatarsal base and exostectomy and reattachment of the peroneus brevis tendon, right cuboid plantar exostectomy, right tendon Achilles lengthening, posterior tibialis tendon lengthening, and right fifth digit proximal phalanx base resection.     ---  HOSPITAL COURSE:   Patient was admitted to medicine for phil-operative observation and management. As patient was NPO status, his glucose fluctuated and required IV dextrose. On 1/18/23, patient underwent podiatric intervention for charcot foot deformity with Dr. Castaneda. Patient tolerated the procedure well and recovered appropriately, with stabalization of glucose levels. The patient was medically stable for discharge with close podiatric follow up at the wound care center.      ---  CONSULTANTS:   Podiatry - Florin   nephrology - Manny   Cardiology -  Russo  Endocrinology -Perlman    --  DAY OF DISCHARGE   VITALS/PE   T(C): 36.3 (01-20-23 @ 05:25), Max: 36.8 (01-19-23 @ 12:50)  HR: 79 (01-20-23 @ 05:25) (69 - 79)  BP: 147/65 (01-20-23 @ 05:25) (129/64 - 162/73)  RR: 18 (01-20-23 @ 05:25) (18 - 18)  SpO2: 93% (01-20-23 @ 05:25) (92% - 96%)    CONSTITUTIONAL: Well groomed, no apparent distress  EYES: PERRLA and symmetric, EOMI  ENMT: Oral mucosa with moist membranes. Normal dentition  RESP: No respiratory distress, no use of accessory muscles; CTA b/l, no WRR  CV: RRR, +S1S2, no MRG; no JVD; no peripheral edema  GI: Soft, NT, ND, no rebound, no guarding;  MSK: Patient is non-weight bearing on R leg. B/l Upper extremity strenght is symmetric   NEURO: CN II-XII intact;   PSYCH: Appropriate insight/judgment; A+O x 3, mood and affect appropriate, recent/remote memory intact

## 2023-01-20 NOTE — PROGRESS NOTE ADULT - NS ATTEND AMEND GEN_ALL_CORE FT
s/p podiatric intervention and tolerated well   no evidence of active ischemic heart disease, decompensated heart failure, severe obstructive valvular disease, or uncontrolled arrhythmia.
Tolerated OR with no cardiac complications.  d/c planning per primary team.
Optimized for the OR from a cardiac point of view with no evidence of active ischemic heart disease, decompensated heart failure, severe obstructive valvular disease, or uncontrolled arrhythmia.

## 2023-01-20 NOTE — PROGRESS NOTE ADULT - SUBJECTIVE AND OBJECTIVE BOX
Patient is a 57y old  Male who presents with a chief complaint of Right Charcot Foot (19 Jan 2023 11:36)    Patient seen in follow up for CKD, h/o Kidney transplant.        PAST MEDICAL HISTORY:  Hypertension    Hyperlipidemia    Diabetes mellitus    CKD (chronic kidney disease)    Diabetic peripheral neuropathy    Obesity (BMI 30-39.9)    CVA (cerebral vascular accident)    Squamous cell carcinoma of skin of left ear    History of DVT (deep vein thrombosis)    Recurrent squamous cell carcinoma of skin    History of pleural effusion    CARMEN treated with BiPAP    History of restrictive lung disease      MEDICATIONS  (STANDING):  aspirin  chewable 81 milliGRAM(s) Oral daily  atorvastatin 10 milliGRAM(s) Oral at bedtime  azaTHIOprine 100 milliGRAM(s) Oral daily  ceFAZolin   IVPB 2000 milliGRAM(s) IV Intermittent every 8 hours  cloNIDine 0.1 milliGRAM(s) Oral two times a day  dextrose 5%. 1000 milliLiter(s) (50 mL/Hr) IV Continuous <Continuous>  dextrose 5%. 1000 milliLiter(s) (100 mL/Hr) IV Continuous <Continuous>  dextrose 5%. 1000 milliLiter(s) (100 mL/Hr) IV Continuous <Continuous>  dextrose 50% Injectable 25 Gram(s) IV Push once  dextrose 50% Injectable 12.5 Gram(s) IV Push once  dextrose 50% Injectable 25 Gram(s) IV Push once  enoxaparin Injectable 40 milliGRAM(s) SubCutaneous every 24 hours  gabapentin 300 milliGRAM(s) Oral two times a day  glucagon  Injectable 1 milliGRAM(s) IntraMuscular once  hydrALAZINE 100 milliGRAM(s) Oral three times a day  insulin lispro (ADMELOG) Pump 1 Each SubCutaneous Continuous Pump  melatonin 5 milliGRAM(s) Oral at bedtime  metoprolol tartrate 50 milliGRAM(s) Oral two times a day  tacrolimus 1.5 milliGRAM(s) Oral <User Schedule>    MEDICATIONS  (PRN):  acetaminophen     Tablet .. 650 milliGRAM(s) Oral every 6 hours PRN Temp greater or equal to 38C (100.4F), Mild Pain (1 - 3)  dextrose Oral Gel 15 Gram(s) Oral once PRN Blood Glucose LESS THAN 70 milliGRAM(s)/deciliter    T(C): 37.1 (01-20-23 @ 12:45), Max: 37.1 (01-20-23 @ 12:45)  HR: 75 (01-20-23 @ 13:37) (65 - 80)  BP: 150/70 (01-20-23 @ 13:37) (116/67 - 168/77)  RR: 18 (01-20-23 @ 12:45)  SpO2: 91% (01-20-23 @ 12:45)  Wt(kg): --  I&O's Detail    19 Jan 2023 07:01  -  20 Jan 2023 07:00  --------------------------------------------------------  IN:    IV PiggyBack: 25 mL    IV PiggyBack: 50 mL    Oral Fluid: 480 mL  Total IN: 555 mL    OUT:    Voided (mL): 2000 mL  Total OUT: 2000 mL    Total NET: -1445 mL              PHYSICAL EXAM:  General: No distress  Respiratory: b/l air entry  Cardiovascular: S1 S2  Gastrointestinal: soft  Extremities:  no edema, Rt foot dressing                  LABORATORY:                        10.6   7.39  )-----------( 195      ( 20 Jan 2023 06:25 )             34.1     01-20    138  |  103  |  28<H>  ----------------------------<  155<H>  4.5   |  31  |  1.30    Ca    8.9      20 Jan 2023 06:25      Sodium, Serum: 138 mmol/L (01-20 @ 06:25)  Sodium, Serum: 139 mmol/L (01-19 @ 06:40)    Potassium, Serum: 4.5 mmol/L (01-20 @ 06:25)  Potassium, Serum: 4.3 mmol/L (01-19 @ 06:40)    Hemoglobin: 10.6 g/dL (01-20 @ 06:25)  Hemoglobin: 11.6 g/dL (01-19 @ 06:40)  Hemoglobin: 11.9 g/dL (01-18 @ 06:41)    Creatinine, Serum 1.30 (01-20 @ 06:25)  Creatinine, Serum 1.20 (01-19 @ 06:40)  Creatinine, Serum 1.30 (01-18 @ 06:41)

## 2023-01-20 NOTE — CASE MANAGEMENT PROGRESS NOTE - NSCMPROGRESSNOTE_GEN_ALL_CORE
DC home today as per Dr. Pang. Pt will be staying at [facility] as it is 1 level with no steps. No HCS arranged as podiatry requests to leave dsg D&I and first follow up within 5 days at wound care ctr. Pt aware and is making appt for tuesday. Pt owns RW and scooter. Patient states his wife will  at 7pm. Patient verbalizes understanding and agrees with DC plan. CM will remain available.

## 2023-01-20 NOTE — DISCHARGE NOTE NURSING/CASE MANAGEMENT/SOCIAL WORK - PATIENT PORTAL LINK FT
You can access the FollowMyHealth Patient Portal offered by [facility] by registering at the following website: [email or web address]. By joining [employer]’s FollowMyHealth portal, you will also be able to view your health information using other applications (apps) compatible with our system.

## 2023-01-20 NOTE — PROGRESS NOTE ADULT - REASON FOR ADMISSION
Right Charcot Foot

## 2023-01-20 NOTE — CONSULT NOTE ADULT - REASON FOR ADMISSION
Right Charcot Foot

## 2023-01-20 NOTE — PROGRESS NOTE ADULT - SUBJECTIVE AND OBJECTIVE BOX
[facility] -- Luly Egan Pannella, Patel, Savella [facility]  Office # [phone]      Follow Up:  cardiac optimization   Subjective/Observations:   No events overnight resting comfortably in bed.  No complaints of chest pain, dyspnea, or palpitations reported. No signs of orthopnea or PND.      REVIEW OF SYSTEMS: All other review of systems is negative unless indicated above    PAST MEDICAL & SURGICAL HISTORY:  Hypertension      Hyperlipidemia      Diabetes mellitus  Type 1 on insulin pump      CKD (chronic kidney disease)  Renal Transplant  at [facility]      Diabetic peripheral neuropathy      Obesity (BMI 30-39.9)      CVA (cerebral vascular accident)  Wallenberg Stroke  low L body sensation  low R face sensation  decreased peripheral vision  poor balance          Squamous cell carcinoma of skin of left ear  nose      History of DVT (deep vein thrombosis)  LLE , was on Eliquis x 6 months  Was hospitalized for Rhinovirus at the time, intubated      Recurrent squamous cell carcinoma of skin  nose, L arm      History of pleural effusion  right thoracentesis 2022 and 2022      CARMEN treated with BiPAP  2L O2 at night      History of restrictive lung disease      Toe infection   L 4th Toe surgery-amputation secondary to osteomyelitis   partial right 2nd 4th toe amputation      Ear disease  Left inner ear surgery, 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, Can NOT have MRI      Vasectomy status  s/p b/l  vasectomy      H/O foot surgery   - right foot - bone fracture - s/p ORIF and subsequent removal of hardware      End-stage renal failure with renal transplant  2013      S/P kidney transplant        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      History of loop recorder            MEDICATIONS  (STANDING):  aspirin  chewable 81 milliGRAM(s) Oral daily  atorvastatin 10 milliGRAM(s) Oral at bedtime  azaTHIOprine 100 milliGRAM(s) Oral daily  ceFAZolin   IVPB 2000 milliGRAM(s) IV Intermittent every 8 hours  cloNIDine 0.1 milliGRAM(s) Oral two times a day  dextrose 5%. 1000 milliLiter(s) (50 mL/Hr) IV Continuous <Continuous>  dextrose 5%. 1000 milliLiter(s) (100 mL/Hr) IV Continuous <Continuous>  dextrose 5%. 1000 milliLiter(s) (100 mL/Hr) IV Continuous <Continuous>  dextrose 50% Injectable 25 Gram(s) IV Push once  dextrose 50% Injectable 12.5 Gram(s) IV Push once  dextrose 50% Injectable 25 Gram(s) IV Push once  enoxaparin Injectable 40 milliGRAM(s) SubCutaneous every 24 hours  gabapentin 300 milliGRAM(s) Oral two times a day  glucagon  Injectable 1 milliGRAM(s) IntraMuscular once  hydrALAZINE 100 milliGRAM(s) Oral three times a day  insulin lispro (HumaLOG) Pump 1 Each SubCutaneous Continuous Pump  melatonin 5 milliGRAM(s) Oral at bedtime  metoprolol tartrate 50 milliGRAM(s) Oral two times a day  tacrolimus 1.5 milliGRAM(s) Oral <User Schedule>    MEDICATIONS  (PRN):  acetaminophen     Tablet .. 650 milliGRAM(s) Oral every 6 hours PRN Temp greater or equal to 38C (100.4F), Mild Pain (1 - 3)  dextrose Oral Gel 15 Gram(s) Oral once PRN Blood Glucose LESS THAN 70 milliGRAM(s)/deciliter      Allergies    No Known Allergies    Intolerances        Vital Signs Last 24 Hrs  T(C): 36.3 (2023 05:25), Max: 36.8 (2023 12:50)  T(F): 97.4 (2023 05:25), Max: 98.3 (2023 12:50)  HR: 79 (2023 05:25) (69 - 79)  BP: 147/65 (2023 05:25) (129/64 - 162/73)  BP(mean): --  RR: 18 (2023 05:25) (18 - 18)  SpO2: 93% (2023 05:25) (92% - 96%)    Parameters below as of 2023 05:25  Patient On (Oxygen Delivery Method): room air        I&O's Summary    2023 07:01  -  2023 07:00  --------------------------------------------------------  IN: 555 mL / OUT: 2000 mL / NET: -1445 mL          PHYSICAL EXAM:  TELE: not on tele   Constitutional: NAD, awake and alert, well-developed  HEENT: Moist Mucous Membranes, Anicteric  Pulmonary: Non-labored, breath sounds are clear bilaterally, No wheezing, crackles or rhonchi  Cardiovascular: Regular, S1 and S2 nl, No murmurs, rubs, gallops or clicks  Gastrointestinal: Bowel Sounds present, soft, nontender.   Lymph: No lymphadenopathy. No peripheral edema.  Skin: No visible rashes or ulcers.  Psych:  Mood & affect appropriate    LABS: All Labs Reviewed:                        11.6   8.41  )-----------( 224      ( 2023 06:40 )             36.6                         11.9   5.75  )-----------( 200      ( 2023 06:41 )             37.2                         12.6   6.44  )-----------( 235      ( 2023 11:55 )             40.3     2023 06:40    139    |  105    |  24     ----------------------------<  99     4.3     |  32     |  1.20   2023 06:41    142    |  106    |  28     ----------------------------<  119    4.3     |  34     |  1.30   2023 11:55    141    |  108    |  28     ----------------------------<  75     4.2     |  30     |  1.30     Ca    8.7        2023 06:40  Ca    8.9        2023 06:41  Ca    9.4        2023 11:55    TPro  6.8    /  Alb  3.2    /  TBili  0.5    /  DBili  x      /  AST  15     /  ALT  16     /  AlkPhos  86     2023 06:41  TPro  8.0    /  Alb  3.6    /  TBili  0.4    /  DBili  x      /  AST  14     /  ALT  16     /  AlkPhos  108    2023 11:55             EC Lead ECG:   Ventricular Rate 66 BPM    Atrial Rate 66 BPM    P-R Interval 244 ms    QRS Duration 86 ms    Q-T Interval 384 ms    QTC Calculation(Bazett) 402 ms    P Axis 10 degrees    R Axis -6 degrees    T Axis 16 degrees    Diagnosis Line Sinus rhythm with 1st degree AV block  Otherwise normal ECG  When compared with ECG of 12-AUG-2022 10:39,  aberrant conduction is no longer present  QT has shortened  Confirmed by JEAN-PIERRE LEIVA ([id number]) on 2023 6:56:01 PM (23 @ 11:48)      Patient name: LUTHER NORIEGA  YOB: 1965   Age: 51 (M)   MR#: [id number]  Study Date: 2017  Location: O/PSonographer: Susie Zuleta JERALD  Study quality: Technically difficult  Referring Physician: Kashmir Ochoa MD  Blood Pressure: 150/77 mmHg  Height: 185 cm  Weight: 136 kg  BSA: 2.6 m2  ------------------------------------------------------------------------  PROCEDURE: Transthoracic echocardiogram with 2-D, M-Mode  and complete spectral and color flow Doppler. Intravenous  catheter inserted. Verbal consent was obtained for  injection of echo contrast following a discussion of risks  and benefits. Following intravenous injection of contrast,  harmonic imaging was performed.  INDICATION: Primary pulmonary hypertension(I27.0)  ------------------------------------------------------------------------  Dimensions:    Normal Values:  LA:     4.0    2.0 - 4.0 cm  Ao:     3.5    2.0 - 3.8 cm  SEPTUM: 1.1    0.6 - 1.2 cm  PWT:    1.2    0.6 - 1.1 cm  LVIDd:  4.9    3.0 -5.6 cm  LVIDs:  3.3    1.8 - 4.0 cm  Derived variables:  LVMI: 83 g/m2  RWT: 0.48  Fractional short: 33 %  EF (Blasicholtz): 61 %  ------------------------------------------------------------------------  Observations:  Mitral Valve: Normal mitral valve. Minimal mitral  regurgitation.  Aortic Valve/Aorta: Normal trileaflet aortic valve.  Aortic Root: 3.5 cm.  Left Atrium: Normal left atrium.  LA volume index = 20  cc/m2.  Left Ventricle: Normal left ventricular systolic function.  No segmental wallmotion abnormalities. Endocardial  visualization enhanced with intravenous injection of echo  contrast (Definity). Increased relative wall thickness with  normal left ventricular mass index, consistent with  concentric left ventricular remodeling.  Right Heart: Normal right atrium. Right ventricular  enlargement with normal right ventricular systolic  function. Normal tricuspid valve. Minimal tricuspid  regurgitation. Normal pulmonic valve.  Pericardium/Pleura: Normal pericardium with no pericardial  effusion.  Hemodynamic: Estimated right atrial pressure is 8 mm Hg.  Estimated right ventricular systolic pressure equals 29 mm  Hg, assuming right atrial pressure equals 8 mm Hg,  consistent with normal pulmonary pressures.  ------------------------------------------------------------------------  Conclusions:  1. Increased relative wall thickness with normal left  ventricular mass index, consistent with concentric left  ventricular remodeling.  2. Normal left ventricular systolic function. No segmental  wall motion abnormalities. Endocardial visualization  enhanced with intravenous injection of echo contrast  (Definity).  ------------------------------------------------------------------------  Confirmed on  2017 - 13:49:24 by RASHEED Del Cid  ------------------------------------------------------------------------      Radiology:         [facility] -- Luly Egan Pannella, Patel, Savella [facility]  Office # [phone]      Follow Up:  cardiac optimization   Subjective/Observations:   No events overnight resting comfortably in bed.  No complaints of chest pain, dyspnea, or palpitations reported. No signs of orthopnea or PND.  on room air sitting in chair offers no complaints eating breakfast     REVIEW OF SYSTEMS: All other review of systems is negative unless indicated above    PAST MEDICAL & SURGICAL HISTORY:  Hypertension      Hyperlipidemia      Diabetes mellitus  Type 1 on insulin pump      CKD (chronic kidney disease)  Renal Transplant  at [facility]      Diabetic peripheral neuropathy      Obesity (BMI 30-39.9)      CVA (cerebral vascular accident)  Wallenberg Stroke  low L body sensation  low R face sensation  decreased peripheral vision  poor balance          Squamous cell carcinoma of skin of left ear  nose      History of DVT (deep vein thrombosis)  LLE , was on Eliquis x 6 months  Was hospitalized for Rhinovirus at the time, intubated      Recurrent squamous cell carcinoma of skin  nose, L arm      History of pleural effusion  right thoracentesis 2022 and 2022      CARMEN treated with BiPAP  2L O2 at night      History of restrictive lung disease      Toe infection   L 4th Toe surgery-amputation secondary to osteomyelitis   partial right 2nd 4th toe amputation      Ear disease  Left inner ear surgery, 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, Can NOT have MRI      Vasectomy status  s/p b/l  vasectomy      H/O foot surgery   - right foot - bone fracture - s/p ORIF and subsequent removal of hardware      End-stage renal failure with renal transplant  2013      S/P kidney transplant        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      History of loop recorder            MEDICATIONS  (STANDING):  aspirin  chewable 81 milliGRAM(s) Oral daily  atorvastatin 10 milliGRAM(s) Oral at bedtime  azaTHIOprine 100 milliGRAM(s) Oral daily  ceFAZolin   IVPB 2000 milliGRAM(s) IV Intermittent every 8 hours  cloNIDine 0.1 milliGRAM(s) Oral two times a day  dextrose 5%. 1000 milliLiter(s) (50 mL/Hr) IV Continuous <Continuous>  dextrose 5%. 1000 milliLiter(s) (100 mL/Hr) IV Continuous <Continuous>  dextrose 5%. 1000 milliLiter(s) (100 mL/Hr) IV Continuous <Continuous>  dextrose 50% Injectable 25 Gram(s) IV Push once  dextrose 50% Injectable 12.5 Gram(s) IV Push once  dextrose 50% Injectable 25 Gram(s) IV Push once  enoxaparin Injectable 40 milliGRAM(s) SubCutaneous every 24 hours  gabapentin 300 milliGRAM(s) Oral two times a day  glucagon  Injectable 1 milliGRAM(s) IntraMuscular once  hydrALAZINE 100 milliGRAM(s) Oral three times a day  insulin lispro (HumaLOG) Pump 1 Each SubCutaneous Continuous Pump  melatonin 5 milliGRAM(s) Oral at bedtime  metoprolol tartrate 50 milliGRAM(s) Oral two times a day  tacrolimus 1.5 milliGRAM(s) Oral <User Schedule>    MEDICATIONS  (PRN):  acetaminophen     Tablet .. 650 milliGRAM(s) Oral every 6 hours PRN Temp greater or equal to 38C (100.4F), Mild Pain (1 - 3)  dextrose Oral Gel 15 Gram(s) Oral once PRN Blood Glucose LESS THAN 70 milliGRAM(s)/deciliter      Allergies    No Known Allergies    Intolerances        Vital Signs Last 24 Hrs  T(C): 36.3 (2023 05:25), Max: 36.8 (2023 12:50)  T(F): 97.4 (2023 05:25), Max: 98.3 (2023 12:50)  HR: 79 (2023 05:25) (69 - 79)  BP: 147/65 (2023 05:25) (129/64 - 162/73)  BP(mean): --  RR: 18 (2023 05:25) (18 - 18)  SpO2: 93% (2023 05:25) (92% - 96%)    Parameters below as of 2023 05:25  Patient On (Oxygen Delivery Method): room air        I&O's Summary    2023 07:01  -  2023 07:00  --------------------------------------------------------  IN: 555 mL / OUT: 2000 mL / NET: -1445 mL          PHYSICAL EXAM:  TELE: not on tele   Constitutional: NAD, awake and alert, obese  HEENT: Moist Mucous Membranes, Anicteric  Pulmonary: Non-labored, breath sounds are clear bilaterally, No wheezing, crackles or rhonchi  Cardiovascular: Regular, S1 and S2 nl, No murmurs, rubs, gallops or clicks  Gastrointestinal: Bowel Sounds present, soft, nontender.   Lymph: No lymphadenopathy. No peripheral edema.  Skin: right foot dressing   Psych:  Mood & affect appropriate    LABS: All Labs Reviewed:                        11.6   8.41  )-----------( 224      ( 2023 06:40 )             36.6                         11.9   5.75  )-----------( 200      ( 2023 06:41 )             37.2                         12.6   6.44  )-----------( 235      ( 2023 11:55 )             40.3     2023 06:40    139    |  105    |  24     ----------------------------<  99     4.3     |  32     |  1.20   2023 06:41    142    |  106    |  28     ----------------------------<  119    4.3     |  34     |  1.30   2023 11:55    141    |  108    |  28     ----------------------------<  75     4.2     |  30     |  1.30     Ca    8.7        2023 06:40  Ca    8.9        2023 06:41  Ca    9.4        2023 11:55    TPro  6.8    /  Alb  3.2    /  TBili  0.5    /  DBili  x      /  AST  15     /  ALT  16     /  AlkPhos  86     2023 06:41  TPro  8.0    /  Alb  3.6    /  TBili  0.4    /  DBili  x      /  AST  14     /  ALT  16     /  AlkPhos  108    2023 11:55             EC Lead ECG:   Ventricular Rate 66 BPM    Atrial Rate 66 BPM    P-R Interval 244 ms    QRS Duration 86 ms    Q-T Interval 384 ms    QTC Calculation(Bazett) 402 ms    P Axis 10 degrees    R Axis -6 degrees    T Axis 16 degrees    Diagnosis Line Sinus rhythm with 1st degree AV block  Otherwise normal ECG  When compared with ECG of 12-AUG-2022 10:39,  aberrant conduction is no longer present  QT has shortened  Confirmed by JEAN-PIERRE LEIVA ([id number]) on 2023 6:56:01 PM (23 @ 11:48)      Patient name: LUTHER NORIEGA  YOB: 1965   Age: 51 (M)   MR#: [id number]  Study Date: 2017  Location: O/PSonographer: Susie Zuleta JERALD  Study quality: Technically difficult  Referring Physician: Kashmir Ochoa MD  Blood Pressure: 150/77 mmHg  Height: 185 cm  Weight: 136 kg  BSA: 2.6 m2  ------------------------------------------------------------------------  PROCEDURE: Transthoracic echocardiogram with 2-D, M-Mode  and complete spectral and color flow Doppler. Intravenous  catheter inserted. Verbal consent was obtained for  injection of echo contrast following a discussion of risks  and benefits. Following intravenous injection of contrast,  harmonic imaging was performed.  INDICATION: Primary pulmonary hypertension(I27.0)  ------------------------------------------------------------------------  Dimensions:    Normal Values:  LA:     4.0    2.0 - 4.0 cm  Ao:     3.5    2.0 - 3.8 cm  SEPTUM: 1.1    0.6 - 1.2 cm  PWT:    1.2    0.6 - 1.1 cm  LVIDd:  4.9    3.0 -5.6 cm  LVIDs:  3.3    1.8 - 4.0 cm  Derived variables:  LVMI: 83 g/m2  RWT: 0.48  Fractional short: 33 %  EF (Lelotz): 61 %  ------------------------------------------------------------------------  Observations:  Mitral Valve: Normal mitral valve. Minimal mitral  regurgitation.  Aortic Valve/Aorta: Normal trileaflet aortic valve.  Aortic Root: 3.5 cm.  Left Atrium: Normal left atrium.  LA volume index = 20  cc/m2.  Left Ventricle: Normal left ventricular systolic function.  No segmental wallmotion abnormalities. Endocardial  visualization enhanced with intravenous injection of echo  contrast (Definity). Increased relative wall thickness with  normal left ventricular mass index, consistent with  concentric left ventricular remodeling.  Right Heart: Normal right atrium. Right ventricular  enlargement with normal right ventricular systolic  function. Normal tricuspid valve. Minimal tricuspid  regurgitation. Normal pulmonic valve.  Pericardium/Pleura: Normal pericardium with no pericardial  effusion.  Hemodynamic: Estimated right atrial pressure is 8 mm Hg.  Estimated right ventricular systolic pressure equals 29 mm  Hg, assuming right atrial pressure equals 8 mm Hg,  consistent with normal pulmonary pressures.  ------------------------------------------------------------------------  Conclusions:  1. Increased relative wall thickness with normal left  ventricular mass index, consistent with concentric left  ventricular remodeling.  2. Normal left ventricular systolic function. No segmental  wall motion abnormalities. Endocardial visualization  enhanced with intravenous injection of echo contrast  (Definity).  ------------------------------------------------------------------------  Confirmed on  2017 - 13:49:24 by RASHEED Del Cid  ------------------------------------------------------------------------      Radiology:

## 2023-01-20 NOTE — DISCHARGE NOTE PROVIDER - NSDCMRMEDTOKEN_GEN_ALL_CORE_FT
aspirin 81 mg oral tablet: 1 tab(s) orally once a day  azaTHIOprine 100 mg oral tablet: 1 tab(s) orally once a day, am  Bactrim 400 mg-80 mg oral tablet: 1 tab(s) orally once a day, am  cloNIDine 0.1 mg oral tablet: 1 tab(s) orally 2 times a day  gabapentin 300 mg oral capsule: 1 tab(s) orally 2 times a day  Humalog pump: 3 unit(s) per hour 24hrs/day with 3 bolus with meals.   hydrALAZINE 100 mg oral tablet: 1 tab(s) orally 3 times a day  lovastatin 40 mg oral tablet: 1 tab(s) orally once a day, pm  metoprolol tartrate 50 mg oral tablet: 1 tab(s) orally 2 times a day  tacrolimus: 1.5 milligram(s) orally 2 times a  (9am, 9pm)- *strictly timed*

## 2023-01-20 NOTE — PROGRESS NOTE ADULT - ASSESSMENT
CKD 3, h/o Kidney transplant 2013 LRD  Diabetes, Diabetic foot ulcer, Charcots foot, s/p surgery 01/18 (Lengthening, Achilles tendon, Posterior tibial tendon lengthening)  Hypertension    Mild increase in creatinine trend. Encourage PO intake as tolerated. To continue current meds. Continue prograf and azathioprine. Prograf levels good.   Monitor blood sugar levels. Insulin coverage as needed. Monitor BP trend. Titrate BP meds as needed. Salt restriction.   Podiatry follow up. Will follow electrolytes and renal function trend.

## 2023-01-20 NOTE — DISCHARGE NOTE NURSING/CASE MANAGEMENT/SOCIAL WORK - NSDCFUADDAPPT_GEN_ALL_CORE_FT
Wound care instructions:   Patient is to keep dressings clean, dry, and intact upon discharge. Patient is to follow up at the [facility] within 5 days upon discharge.

## 2023-01-20 NOTE — DISCHARGE NOTE PROVIDER - NSDCFUSCHEDAPPT_GEN_ALL_CORE_FT
José Kelly  [facility]  CARDIOLOGY [address] [facility]  Scheduled Appointment: 02/28/2023

## 2023-01-20 NOTE — PROGRESS NOTE ADULT - PROVIDER SPECIALTY LIST ADULT
Podiatry
Cardiology
Endocrinology
Cardiology
Cardiology
Diabetes
Nephrology
Nephrology
Diabetes
Hospitalist
Diabetes
Diabetes
Hospitalist

## 2023-01-20 NOTE — PHARMACOTHERAPY INTERVENTION NOTE - COMMENTS
Converted Humalog pump to Admelog pump as patient using own home medication and ran out.  Agreed to use hospital alternative (similar) Admelog. Discussed with NP Weil and ordered Admelog at same parameters per telephone order.

## 2023-01-20 NOTE — DISCHARGE NOTE PROVIDER - CARE PROVIDER_API CALL
Donte Catherine (DPM)  Foot Surgery; Podiatric Medicine; Recon RearfootAnkle Surgery  [address] [place] [address]  Phone: [phone]  Fax: [phone]  Follow Up Time: 1 week    Kobe Miranda)  Medicine  [address] [place] [address] [address]  [place] [address]  Phone: [phone]  Fax: [phone]  Follow Up Time: 2 weeks

## 2023-01-20 NOTE — DISCHARGE NOTE PROVIDER - PROVIDER TOKENS
PROVIDER:[TOKEN:[[id number]],FOLLOWUP:[1 week]],PROVIDER:[TOKEN:[[id number]],FOLLOWUP:[2 weeks]]

## 2023-01-20 NOTE — CONSULT NOTE ADULT - ASSESSMENT
57-year-old male with PMHx of type 1 diabetes mellitus, CVA, recurrent right pleural effusion with VATS, renal transplant sent in from wound care for admission for right Charcot foot surgery with Dr. Catherine. Patient last seen at wound care with Dr. Catherine on 1/12. Patient is status post bone biopsies which are negative for osteomyelitis at this time. Sent into the hospital by Dr. Catherine for surgical procedure  Lengthening, Achilles tendon 18-Jan-2023 18:19:55  Donte Catherine  Posterior tibial tendon lengthening 18-Jan-2023 18:20:11  Donte Catherine  Repair, tendon, peroneal 18-Jan-2023 18:20:59  Donte Catherine  Exostectomy, foot 18-Jan-2023 18:21:09  Donte Catherine.    RECOMMENDATIONS  surgical path is pending but low suspicion in this context for osteomyelitis or need to antibiotics to extend beyond standard periop period  have discussed with patient that we are able to follow up in the outpatient setting to review path and any new micro and thus a discharge today if planned would be reasonable as we have good follow up.    Thank you for consulting us and involving us in the management of this most interesting and challenging case.  We will follow along in the care of this patient. Please call us at [phone] or text me directly on my cell# at [phone] with any concerns.

## 2023-01-20 NOTE — DISCHARGE NOTE PROVIDER - NSDCCPCAREPLAN_GEN_ALL_CORE_FT
PRINCIPAL DISCHARGE DIAGNOSIS  Diagnosis: Diabetic Charcot foot  Assessment and Plan of Treatment:       SECONDARY DISCHARGE DIAGNOSES  Diagnosis: Hypertension  Assessment and Plan of Treatment:     Diagnosis: History of renal transplant  Assessment and Plan of Treatment:     Diagnosis: Type 1 diabetes mellitus  Assessment and Plan of Treatment:      PRINCIPAL DISCHARGE DIAGNOSIS  Diagnosis: Diabetic Charcot foot  Assessment and Plan of Treatment: Leave dressing in place until seeing wound care team on Monday/Tuesday. Call [phone] if you have medical questions/concerns.      SECONDARY DISCHARGE DIAGNOSES  Diagnosis: Type 1 diabetes mellitus  Assessment and Plan of Treatment: Continue to control your insulin use with pump and monitor sugars per your typical protocol.    Diagnosis: Hypertension  Assessment and Plan of Treatment:     Diagnosis: Charcot's joint of right foot  Assessment and Plan of Treatment:     Diagnosis: History of renal transplant  Assessment and Plan of Treatment:

## 2023-01-20 NOTE — DISCHARGE NOTE NURSING/CASE MANAGEMENT/SOCIAL WORK - NSDCPEFALRISK_GEN_ALL_CORE
For information on Fall & Injury Prevention, visit: [email or web address] OR  [email or web address] OR  https://www.cdc.gov/steadi/patient.html

## 2023-01-20 NOTE — DISCHARGE NOTE NURSING/CASE MANAGEMENT/SOCIAL WORK - NSDCVIVACCINE_GEN_ALL_CORE_FT
influenza, injectable, quadrivalent, preservative free; 27-Nov-2015 13:32; Jv Zhu (RN); [employer]; [id number]; IntraMuscular; Deltoid Right.; 0.5 milliLiter(s); VIS (VIS Published: 07-Aug-2015, VIS Presented: 27-Nov-2015);   influenza, injectable, quadrivalent, preservative free; 22-Sep-2021 13:06; Eugene Mccarthy (RN); [employer]; [id number] (Exp. Date: 30-Jun-2022); IntraMuscular; Deltoid Left.; 0.5 milliLiter(s); VIS (VIS Published: 15-Aug-2019, VIS Presented: 22-Sep-2021);

## 2023-01-20 NOTE — PROGRESS NOTE ADULT - ASSESSMENT
57-year-old male with PMHx of type 1 diabetes, CVA, recurrent right pleural effusion with VATS, renal transplant sent in from wound care for admission for right Charcot foot surgery with Dr. Catherine, cardiology consulted for optimization.    in progress    Cardiac Optimization  - s/p podiatric intervention, tolerated well from CV POV  - No acute changes on EKG compared to previous.  - No meaningful evidence of volume overload. No O2 requirement   - Previous TTE 7/22 showed normal LV function w/ no valvular disease. Recent stress test with no ischemia on 8/2022.  - BP , HR stable and controlled 147/65   - Continue home hydralazine, clonidine,  metoprolol, statin, ASA  - Monitor and replete Lytes. Keep K > 4 and Mg > 2  Jennifer Ross FNP-C  Cardiology NP  [facility] [phone]       57-year-old male with PMHx of type 1 diabetes, CVA, recurrent right pleural effusion with VATS, renal transplant sent in from wound care for admission for right Charcot foot surgery with Dr. Catherine, cardiology consulted for optimization.      Cardiac Optimization  - s/p podiatric intervention, tolerated well from CV POV  - No acute changes on EKG compared to previous.  - No meaningful evidence of volume overload. No O2 requirement   - Previous TTE 7/22 showed normal LV function w/ no valvular disease. Recent stress test with no ischemia on 8/2022.  - BP , HR stable and controlled 147/65   - Continue home hydralazine, clonidine,  metoprolol, statin, ASA  - Monitor and replete Lytes. Keep K > 4 and Mg > 2  DC planning to fu with Dr saleh on dc   Jennifer Ross FNP-C  Cardiology NP  [facility] [phone]

## 2023-01-20 NOTE — DISCHARGE NOTE PROVIDER - NSDCFUADDAPPT_GEN_ALL_CORE_FT
Wound care instructions:   Patient is to keep dressings clean, dry, and intact upon discharge. Patient is to follow up at the [facility] within 5 days upon discharge.

## 2023-01-20 NOTE — PROGRESS NOTE ADULT - PROBLEM SELECTOR PROBLEM 1
Type 1 diabetes mellitus
Type 1 diabetes mellitus
Charcot's joint of right foot
Charcot's joint of right foot
Type 1 diabetes mellitus
Charcot's joint of right foot
Type 1 diabetes mellitus

## 2023-01-20 NOTE — CONSULT NOTE ADULT - CONSULT REQUESTED DATE/TIME
17-Jan-2023 15:09
18-Jan-2023 09:27
18-Jan-2023 15:15
20-Jan-2023 11:12
17-Jan-2023 15:40
17-Jan-2023 17:20

## 2023-01-22 ENCOUNTER — NON-APPOINTMENT (OUTPATIENT)
Age: 58
End: 2023-01-22

## 2023-01-23 ENCOUNTER — OUTPATIENT (OUTPATIENT)
Dept: OUTPATIENT SERVICES | Facility: HOSPITAL | Age: 58
LOS: 1 days | Discharge: HOME | End: 2023-01-23
Payer: MEDICARE

## 2023-01-23 ENCOUNTER — APPOINTMENT (OUTPATIENT)
Dept: WOUND CARE | Facility: HOSPITAL | Age: 58
End: 2023-01-23
Payer: MEDICARE

## 2023-01-23 VITALS
TEMPERATURE: 98.5 F | HEIGHT: 73 IN | RESPIRATION RATE: 20 BRPM | WEIGHT: 315 LBS | DIASTOLIC BLOOD PRESSURE: 78 MMHG | BODY MASS INDEX: 41.75 KG/M2 | OXYGEN SATURATION: 95 % | SYSTOLIC BLOOD PRESSURE: 130 MMHG | HEART RATE: 69 BPM

## 2023-01-23 DIAGNOSIS — Z79.899 OTHER LONG TERM (CURRENT) DRUG THERAPY: ICD-10-CM

## 2023-01-23 DIAGNOSIS — Z86.73 PERSONAL HISTORY OF TRANSIENT ISCHEMIC ATTACK (TIA), AND CEREBRAL INFARCTION WITHOUT RESIDUAL DEFICITS: ICD-10-CM

## 2023-01-23 DIAGNOSIS — Z80.8 FAMILY HISTORY OF MALIGNANT NEOPLASM OF OTHER ORGANS OR SYSTEMS: ICD-10-CM

## 2023-01-23 DIAGNOSIS — M14.671 CHARCOT'S JOINT, RIGHT ANKLE AND FOOT: ICD-10-CM

## 2023-01-23 DIAGNOSIS — E11.621 TYPE 2 DIABETES MELLITUS WITH FOOT ULCER: ICD-10-CM

## 2023-01-23 DIAGNOSIS — L84 CORNS AND CALLOSITIES: ICD-10-CM

## 2023-01-23 DIAGNOSIS — Z89.422 ACQUIRED ABSENCE OF OTHER LEFT TOE(S): ICD-10-CM

## 2023-01-23 DIAGNOSIS — Z79.4 LONG TERM (CURRENT) USE OF INSULIN: ICD-10-CM

## 2023-01-23 DIAGNOSIS — Z87.81 PERSONAL HISTORY OF (HEALED) TRAUMATIC FRACTURE: ICD-10-CM

## 2023-01-23 DIAGNOSIS — E78.5 HYPERLIPIDEMIA, UNSPECIFIED: ICD-10-CM

## 2023-01-23 DIAGNOSIS — Z94.0 KIDNEY TRANSPLANT STATUS: Chronic | ICD-10-CM

## 2023-01-23 DIAGNOSIS — Z85.828 PERSONAL HISTORY OF OTHER MALIGNANT NEOPLASM OF SKIN: ICD-10-CM

## 2023-01-23 DIAGNOSIS — L97.412 NON-PRESSURE CHRONIC ULCER OF RIGHT HEEL AND MIDFOOT WITH FAT LAYER EXPOSED: ICD-10-CM

## 2023-01-23 DIAGNOSIS — Z89.421 ACQUIRED ABSENCE OF OTHER RIGHT TOE(S): ICD-10-CM

## 2023-01-23 DIAGNOSIS — L97.512 NON-PRESSURE CHRONIC ULCER OF OTHER PART OF RIGHT FOOT WITH FAT LAYER EXPOSED: ICD-10-CM

## 2023-01-23 DIAGNOSIS — Z89.421 ACQUIRED ABSENCE OF OTHER RIGHT TOE(S): Chronic | ICD-10-CM

## 2023-01-23 DIAGNOSIS — G47.33 OBSTRUCTIVE SLEEP APNEA (ADULT) (PEDIATRIC): ICD-10-CM

## 2023-01-23 DIAGNOSIS — Z80.3 FAMILY HISTORY OF MALIGNANT NEOPLASM OF BREAST: ICD-10-CM

## 2023-01-23 DIAGNOSIS — E11.51 TYPE 2 DIABETES MELLITUS WITH DIABETIC PERIPHERAL ANGIOPATHY WITHOUT GANGRENE: ICD-10-CM

## 2023-01-23 DIAGNOSIS — Z94.0 KIDNEY TRANSPLANT STATUS: ICD-10-CM

## 2023-01-23 DIAGNOSIS — I10 ESSENTIAL (PRIMARY) HYPERTENSION: ICD-10-CM

## 2023-01-23 DIAGNOSIS — N18.6 END STAGE RENAL DISEASE: Chronic | ICD-10-CM

## 2023-01-23 DIAGNOSIS — Z86.718 PERSONAL HISTORY OF OTHER VENOUS THROMBOSIS AND EMBOLISM: ICD-10-CM

## 2023-01-23 DIAGNOSIS — Z98.890 OTHER SPECIFIED POSTPROCEDURAL STATES: Chronic | ICD-10-CM

## 2023-01-23 DIAGNOSIS — Z86.16 PERSONAL HISTORY OF COVID-19: ICD-10-CM

## 2023-01-23 DIAGNOSIS — E11.610 TYPE 2 DIABETES MELLITUS WITH DIABETIC NEUROPATHIC ARTHROPATHY: ICD-10-CM

## 2023-01-23 DIAGNOSIS — Z83.3 FAMILY HISTORY OF DIABETES MELLITUS: ICD-10-CM

## 2023-01-23 PROCEDURE — 99024 POSTOP FOLLOW-UP VISIT: CPT

## 2023-01-23 PROCEDURE — G0463: CPT

## 2023-01-23 NOTE — REVIEW OF SYSTEMS
[Fever] : no fever [Chills] : no chills [Eye Pain] : no eye pain [Loss Of Hearing] : no hearing loss [Shortness Of Breath] : no shortness of breath [Abdominal Pain] : no abdominal pain [Vomiting] : no vomiting [Skin Lesions] : no skin lesions [Skin Wound] : no skin wound [Anxiety] : no anxiety [Easy Bleeding] : no tendency for easy bleeding [FreeTextEntry5] : HTN,HLD [FreeTextEntry7] : 40.9 BMI , morbid obesity  [FreeTextEntry8] : Kidney Transplant  [FreeTextEntry9] : Associated Diabetic Charcot foot  [de-identified] : plantar right foot midfoot , lateral  DFU 2  , the ulcer is clean and granular s/p surgical intervention  [de-identified] : IDDM with neuropathy  [de-identified] : MARILUZ

## 2023-01-23 NOTE — PHYSICAL THERAPY INITIAL EVALUATION ADULT - WORK/LEISURE ACTIVITY, REHAB EVAL
"Nutrition Assessment   Reason for Consult/Assessment: BMI      Diagnosis and Hx: Reviewed    Pertinent Nutrition History: crohn's, bowel resection    Pertinent Nutrition Information: Admited with abd pain                                 Diet Order: NPO                  Diet tolerance: NPO   Food Allergies: None known    Demographic/Anthropometrics Information  Gender: male   Patient Age: 28year old  Height:   Ht Readings from Last 1 Encounters:   11/07/22 6' (1.829 m)      Weight:   Wt Readings from Last 1 Encounters:   11/07/22 61.7 kg      BMI:   BMI Readings from Last 1 Encounters:   11/07/22 18.44 kg/mÂ²                      Estimated Needs:                                      NFPE  Nutrition Focused Physical Exam  Physical Exam Completed: No  Reason Not Completed:  (pain)                      1/23/23: Pt in pain, limited interview. Pt reported GI was going to allow him to start on clears ""just sips\"". Pt reports actual ht 5'11\"" making BMI >18.5 however noted wt has trended down.      TREATMENT PLAN: Monitoring & Interventions   Intervention: Meals and snacks                                                                             Goal: Increase oral intake to >/equal 50% of meals and supplements   Intervention goal status: Initiated  Time frame to achieve goal: 1-3 days     Dietitian will monitor: Food, beverage, and nutrient intake      Nutrition Diagnosis / PES  Nutrition Diagnosis: Inadequate energy intake  Related to: Decreased intake   As evidenced by: Weight loss over time      Primary Nutrition Diagnosis status: Active nutrition diagnosis                   " independent

## 2023-01-23 NOTE — PLAN
[FreeTextEntry1] : continue off loading and return for dressing change and recheck on Thursday . Discussed pathology with the patient and he will call Dr Saucedo to see if picc line may be needed for suspect OM of the bone specimens . Patient claims he is in strict off loading and bears no weight on his right foot Spent 20 minutes for patient care and medical decision making.\par

## 2023-01-23 NOTE — PHYSICAL EXAM
[0] : left 0 [1+] : left 1+ [Varicose Veins Of Lower Extremities] : not present [] : not present [Purpura] : no purpura  [Petechiae] : no petechiae [Skin Ulcer] : ulcer [Skin Induration] : no induration [Alert] : alert [Oriented to Person] : oriented to person [Oriented to Place] : oriented to place [Oriented to Time] : oriented to time [Calm] : calm [de-identified] : adult WM, NAD, alert, Ox 3. [de-identified] : HTN, HLD [de-identified] : Diabetic Charcot right foot deformity [de-identified] : right foot plantar ulcer 5th metatarsal ulcer down to skin, subcutaneous tissue, and fat. ulcer lateral head of 5th metatarsal down to skin, subcutaneous tissue, fat. All sutures intact  [de-identified] : Diabetic neuropathy  [FreeTextEntry3] : 0.4 [FreeTextEntry4] : 0.3 [de-identified] : 7 [FreeTextEntry8] : 0.4 [FreeTextEntry9] : 0.8 [de-identified] : 0.2 [de-identified] : 9.5cm [de-identified] : Moderate Serosanguineous [FreeTextEntry1] : Right Lateral Foot- Incision line with sutures in place (s/p Foot surgery on 01/18/23) [FreeTextEntry2] : 14.0cm [de-identified] : Small Serosanguineous [de-identified] : Intact [de-identified] : Adaptic Touch/ Silver Alginate/ Dry Dressing & Kerlix [de-identified] : Cleansed with Normal saline\par  [FreeTextEntry7] : Right Foot 5th Digit- Incision line with sutures in place (s/p Foot surgery on 01/18/23) [de-identified] : Scant Serosanguineous [de-identified] : Intact [de-identified] : Adaptic Touch/ Silver Alginate/ Dry Dressing & Kerlix [de-identified] : Cleansed with Normal saline\par  [de-identified] : Right Achilles- 3 single sutures in 3 separate areas (s/p Foot surgery on 01/18/23) [de-identified] : Intact [de-identified] : Adaptic Touch/ Dry Dressing & Kerlix [de-identified] : Cleansed with Normal saline\par  [de-identified] : None [de-identified] : 100% [de-identified] : No [de-identified] : None [de-identified] : 100% [de-identified] : No [de-identified] : None [de-identified] : None [de-identified] : Ace wraps [de-identified] : None [de-identified] : None [de-identified] : Ace wraps [de-identified] : None [de-identified] : None [de-identified] : None [de-identified] : Ace wraps

## 2023-01-23 NOTE — ASSESSMENT
[Verbal] : Verbal [Demo] : Demo [Patient] : Patient [Spouse] : Spouse [Good - alert, interested, motivated] : Good - alert, interested, motivated [Verbalizes knowledge/Understanding] : Verbalizes knowledge/understanding [Dressing changes] : dressing changes [Foot Care] : foot care [Skin Care] : skin care [Pressure relief] : pressure relief [Signs and symptoms of infection] : sign and symptoms of infection [How and When to Call] : how and when to call [Labs and Tests] : labs and tests [Off-loading] : off-loading [Compression Therapy] : compression therapy [Patient responsibility to plan of care] : patient responsibility to plan of care [] : Yes [Stable] : stable [Home] : Home [Wheelchair] : Wheelchair [FreeTextEntry2] : Alteration in skin integrity- promote optimal skin integrity\par  [FreeTextEntry4] : Dr Araiza/ Photos taken\par Pt hospitalized 01/17/23-01/23/23 & underwent Foot surgery on 01/18/23 by Dr Catherine\par Pathology results discussed with Pt & Pt's Wife by Dr Araiza\par Pt instructed by Dr Araiza to call Dr Mckenzie (ID) to discuss Pathology results\par F/U to [facility] on Thursday, 01/26/23

## 2023-01-23 NOTE — HISTORY OF PRESENT ILLNESS
[FreeTextEntry1] : s/p tendon transfers and resection of bone right foot , no clinical signs of infection

## 2023-01-25 NOTE — PHYSICAL EXAM
[4 x 4] : 4 x 4  [Abdominal Pad] : Abdominal Pad [0] : left 0 [1+] : left 1+ [Skin Ulcer] : ulcer [Alert] : alert [Oriented to Person] : oriented to person [Oriented to Place] : oriented to place [Oriented to Time] : oriented to time [Calm] : calm [Varicose Veins Of Lower Extremities] : not present [] : not present [Purpura] : no purpura  [Petechiae] : no petechiae [Skin Induration] : no induration [de-identified] : adult WM, NAD, alert, Ox 3. [de-identified] : HTN, HLD [de-identified] : Diabetic Charcot right foot deformity [de-identified] : right foot plantar ulcer 5th metatarsal ulcer down to skin, subcutaneous tissue, and fat. ulcer lateral head of 5th metatarsal down to skin, subcutaneous tissue, fat. [de-identified] : Diabetic neuropathy  [de-identified] : Pt tolerated procedure well.\par Pathology obtained and sent to lab [FreeTextEntry1] :  Foot Lateral 5th Met [FreeTextEntry2] : 0.5 [FreeTextEntry3] : 0.4 [FreeTextEntry4] : 1.2 [de-identified] : small purulent [de-identified] : callused [de-identified] : 90% [de-identified] : 10% [de-identified] : 0.5% Bupivacaine  [FreeTextEntry5] : Other [de-identified] : Bone Biopsy [de-identified] : Alginate Ag [de-identified] : Mechanically cleansed with sterile gauze and normal saline 0.9%\par Dry Dressing\par  [FreeTextEntry7] : Plantar Foot [FreeTextEntry8] : 1.1 [FreeTextEntry9] : 1.1 [de-identified] : 0.3 [de-identified] : moderate serosanguineous [de-identified] : callused [de-identified] : Alginate Ag [de-identified] : Mechanically cleansed with sterile gauze and normal saline 0.9%\par Dry Dressing\par  [TWNoteComboBox1] : Right [TWNoteComboBox6] : Other [de-identified] : No [de-identified] : other [de-identified] : None [de-identified] : None [de-identified] : Yes [de-identified] : Biopsy taken and sent for pathology [de-identified] : Daily [de-identified] : Primary Dressing [TWNoteComboBox9] : Right [de-identified] : No [de-identified] : Pressure [de-identified] : No [de-identified] : other [de-identified] : None [de-identified] : None [de-identified] : 100% [de-identified] : No [de-identified] : Daily [de-identified] : Primary Dressing

## 2023-01-25 NOTE — PROCEDURE
[FreeTextEntry9] : 5346 [de-identified] : right foot plantar ulcer 5th metatarsal ulcer down to skin, subcutaneous tissue, and fat. ulcer lateral head of 5th metatarsal down to skin, subcutaneous tissue, fat. [de-identified] : carleen [de-identified] : christopher [FreeTextEntry6] : right foot plantar ulcer 5th metatarsal ulcer down to skin, subcutaneous tissue, and fat. ulcer lateral head of 5th metatarsal down to skin, subcutaneous tissue, fat. [FreeTextEntry7] : right foot plantar ulcer 5th metatarsal ulcer down to skin, subcutaneous tissue, and fat. ulcer lateral head of 5th metatarsal down to skin, subcutaneous tissue, fat. [de-identified] : 5mL [de-identified] : Bone

## 2023-01-25 NOTE — REVIEW OF SYSTEMS
[FreeTextEntry1] : 1590 [Fever] : no fever [Chills] : no chills [Eye Pain] : no eye pain [Loss Of Hearing] : no hearing loss [Shortness Of Breath] : no shortness of breath [Abdominal Pain] : no abdominal pain [Vomiting] : no vomiting [Skin Lesions] : no skin lesions [Skin Wound] : no skin wound [Anxiety] : no anxiety [Easy Bleeding] : no tendency for easy bleeding [FreeTextEntry5] : HTN,HLD [FreeTextEntry7] : 40.9 BMI , morbid obesity  [FreeTextEntry8] : Kidney Transplant  [FreeTextEntry9] : Associated Diabetic Charcot foot  [de-identified] : plantar right foot midfoot , lateral  DFU 2  , the ulcer is clean and granular s/p surgical intervention  [de-identified] : IDDM with neuropathy  [de-identified] : MARILUZ

## 2023-01-25 NOTE — ASSESSMENT
[Verbal] : Verbal [Demo] : Demo [Patient] : Patient [Spouse] : Spouse [Good - alert, interested, motivated] : Good - alert, interested, motivated [Verbalizes knowledge/Understanding] : Verbalizes knowledge/understanding [Dressing changes] : dressing changes [Foot Care] : foot care [Skin Care] : skin care [Pressure relief] : pressure relief [Signs and symptoms of infection] : sign and symptoms of infection [Surgery] : surgery [How and When to Call] : how and when to call [Labs and Tests] : labs and tests [Off-loading] : off-loading [Patient responsibility to plan of care] : patient responsibility to plan of care [Glycemic Control] : glycemic control [Stable] : stable [Home] : Home [Ambulatory] : Ambulatory [Not Applicable - Long Term Care/Home Health Agency] : Long Term Care/Home Health Agency: Not Applicable [] : No [FreeTextEntry2] : Infection prevention \par Wound care (dressing changes)\par Maintain optimal skin integrity to high pressure areas\par Compression therapy\par Nutrition and wound healing\par Elevation and low sodium compliance.\par Offloading the stress on skin structures and decreasing potential pathologic biomechanical influences.\par Weight reduction Strategies\par Pathology\par Glycemic control [FreeTextEntry3] : right lateral foot 5th met wound measures larger with increase depth dimensions  [FreeTextEntry4] : Pathology obtained & sent to lab\par DPM discussed with Pt surgical intervention of the right foot. Pt has a planned foot operation for 2 weeks.\par Pt to f/u in 1 week.

## 2023-01-26 ENCOUNTER — OUTPATIENT (OUTPATIENT)
Dept: OUTPATIENT SERVICES | Facility: HOSPITAL | Age: 58
LOS: 1 days | Discharge: HOME | End: 2023-01-26
Payer: MEDICARE

## 2023-01-26 ENCOUNTER — APPOINTMENT (OUTPATIENT)
Dept: WOUND CARE | Facility: HOSPITAL | Age: 58
End: 2023-01-26
Payer: MEDICARE

## 2023-01-26 VITALS
BODY MASS INDEX: 41.75 KG/M2 | HEIGHT: 73 IN | OXYGEN SATURATION: 94 % | HEART RATE: 70 BPM | TEMPERATURE: 98.7 F | WEIGHT: 315 LBS | RESPIRATION RATE: 20 BRPM | DIASTOLIC BLOOD PRESSURE: 80 MMHG | SYSTOLIC BLOOD PRESSURE: 164 MMHG

## 2023-01-26 DIAGNOSIS — N18.6 END STAGE RENAL DISEASE: Chronic | ICD-10-CM

## 2023-01-26 DIAGNOSIS — M14.671 CHARCOT'S JOINT, RIGHT ANKLE AND FOOT: ICD-10-CM

## 2023-01-26 DIAGNOSIS — Z98.890 OTHER SPECIFIED POSTPROCEDURAL STATES: Chronic | ICD-10-CM

## 2023-01-26 DIAGNOSIS — Z94.0 KIDNEY TRANSPLANT STATUS: Chronic | ICD-10-CM

## 2023-01-26 DIAGNOSIS — Z89.421 ACQUIRED ABSENCE OF OTHER RIGHT TOE(S): Chronic | ICD-10-CM

## 2023-01-26 PROCEDURE — G0463: CPT

## 2023-01-26 PROCEDURE — 99024 POSTOP FOLLOW-UP VISIT: CPT

## 2023-01-28 DIAGNOSIS — Z89.421 ACQUIRED ABSENCE OF OTHER RIGHT TOE(S): ICD-10-CM

## 2023-01-28 DIAGNOSIS — Z79.899 OTHER LONG TERM (CURRENT) DRUG THERAPY: ICD-10-CM

## 2023-01-28 DIAGNOSIS — Z79.4 LONG TERM (CURRENT) USE OF INSULIN: ICD-10-CM

## 2023-01-28 DIAGNOSIS — L97.412 NON-PRESSURE CHRONIC ULCER OF RIGHT HEEL AND MIDFOOT WITH FAT LAYER EXPOSED: ICD-10-CM

## 2023-01-28 DIAGNOSIS — Z83.3 FAMILY HISTORY OF DIABETES MELLITUS: ICD-10-CM

## 2023-01-28 DIAGNOSIS — Z94.0 KIDNEY TRANSPLANT STATUS: ICD-10-CM

## 2023-01-28 DIAGNOSIS — E11.621 TYPE 2 DIABETES MELLITUS WITH FOOT ULCER: ICD-10-CM

## 2023-01-28 DIAGNOSIS — E11.610 TYPE 2 DIABETES MELLITUS WITH DIABETIC NEUROPATHIC ARTHROPATHY: ICD-10-CM

## 2023-01-28 DIAGNOSIS — I10 ESSENTIAL (PRIMARY) HYPERTENSION: ICD-10-CM

## 2023-01-28 DIAGNOSIS — Z86.16 PERSONAL HISTORY OF COVID-19: ICD-10-CM

## 2023-01-28 DIAGNOSIS — G47.33 OBSTRUCTIVE SLEEP APNEA (ADULT) (PEDIATRIC): ICD-10-CM

## 2023-01-28 DIAGNOSIS — Z86.73 PERSONAL HISTORY OF TRANSIENT ISCHEMIC ATTACK (TIA), AND CEREBRAL INFARCTION WITHOUT RESIDUAL DEFICITS: ICD-10-CM

## 2023-01-28 DIAGNOSIS — Z89.422 ACQUIRED ABSENCE OF OTHER LEFT TOE(S): ICD-10-CM

## 2023-01-28 DIAGNOSIS — E78.5 HYPERLIPIDEMIA, UNSPECIFIED: ICD-10-CM

## 2023-01-28 DIAGNOSIS — E11.51 TYPE 2 DIABETES MELLITUS WITH DIABETIC PERIPHERAL ANGIOPATHY WITHOUT GANGRENE: ICD-10-CM

## 2023-01-28 DIAGNOSIS — L84 CORNS AND CALLOSITIES: ICD-10-CM

## 2023-01-28 DIAGNOSIS — Z86.718 PERSONAL HISTORY OF OTHER VENOUS THROMBOSIS AND EMBOLISM: ICD-10-CM

## 2023-01-28 DIAGNOSIS — Z85.828 PERSONAL HISTORY OF OTHER MALIGNANT NEOPLASM OF SKIN: ICD-10-CM

## 2023-01-28 DIAGNOSIS — Z80.8 FAMILY HISTORY OF MALIGNANT NEOPLASM OF OTHER ORGANS OR SYSTEMS: ICD-10-CM

## 2023-01-28 DIAGNOSIS — Z87.81 PERSONAL HISTORY OF (HEALED) TRAUMATIC FRACTURE: ICD-10-CM

## 2023-01-28 DIAGNOSIS — Z80.3 FAMILY HISTORY OF MALIGNANT NEOPLASM OF BREAST: ICD-10-CM

## 2023-01-28 DIAGNOSIS — L97.512 NON-PRESSURE CHRONIC ULCER OF OTHER PART OF RIGHT FOOT WITH FAT LAYER EXPOSED: ICD-10-CM

## 2023-01-31 ENCOUNTER — OUTPATIENT (OUTPATIENT)
Dept: OUTPATIENT SERVICES | Facility: HOSPITAL | Age: 58
LOS: 1 days | Discharge: HOME | End: 2023-01-31
Payer: MEDICARE

## 2023-01-31 ENCOUNTER — APPOINTMENT (OUTPATIENT)
Dept: WOUND CARE | Facility: HOSPITAL | Age: 58
End: 2023-01-31
Payer: MEDICARE

## 2023-01-31 VITALS
SYSTOLIC BLOOD PRESSURE: 183 MMHG | OXYGEN SATURATION: 98 % | TEMPERATURE: 98.1 F | HEART RATE: 81 BPM | DIASTOLIC BLOOD PRESSURE: 86 MMHG | RESPIRATION RATE: 18 BRPM

## 2023-01-31 DIAGNOSIS — M14.671 CHARCOT'S JOINT, RIGHT ANKLE AND FOOT: ICD-10-CM

## 2023-01-31 DIAGNOSIS — N18.6 END STAGE RENAL DISEASE: Chronic | ICD-10-CM

## 2023-01-31 DIAGNOSIS — Z94.0 KIDNEY TRANSPLANT STATUS: Chronic | ICD-10-CM

## 2023-01-31 DIAGNOSIS — Z98.890 OTHER SPECIFIED POSTPROCEDURAL STATES: Chronic | ICD-10-CM

## 2023-01-31 DIAGNOSIS — Z89.421 ACQUIRED ABSENCE OF OTHER RIGHT TOE(S): Chronic | ICD-10-CM

## 2023-01-31 PROCEDURE — 99024 POSTOP FOLLOW-UP VISIT: CPT

## 2023-01-31 PROCEDURE — G0463: CPT

## 2023-02-02 DIAGNOSIS — I10 ESSENTIAL (PRIMARY) HYPERTENSION: ICD-10-CM

## 2023-02-02 DIAGNOSIS — E11.621 TYPE 2 DIABETES MELLITUS WITH FOOT ULCER: ICD-10-CM

## 2023-02-02 DIAGNOSIS — Z85.828 PERSONAL HISTORY OF OTHER MALIGNANT NEOPLASM OF SKIN: ICD-10-CM

## 2023-02-02 DIAGNOSIS — Z86.16 PERSONAL HISTORY OF COVID-19: ICD-10-CM

## 2023-02-02 DIAGNOSIS — Z79.4 LONG TERM (CURRENT) USE OF INSULIN: ICD-10-CM

## 2023-02-02 DIAGNOSIS — L84 CORNS AND CALLOSITIES: ICD-10-CM

## 2023-02-02 DIAGNOSIS — G47.33 OBSTRUCTIVE SLEEP APNEA (ADULT) (PEDIATRIC): ICD-10-CM

## 2023-02-02 DIAGNOSIS — Z79.899 OTHER LONG TERM (CURRENT) DRUG THERAPY: ICD-10-CM

## 2023-02-02 DIAGNOSIS — Z87.81 PERSONAL HISTORY OF (HEALED) TRAUMATIC FRACTURE: ICD-10-CM

## 2023-02-02 DIAGNOSIS — Z89.421 ACQUIRED ABSENCE OF OTHER RIGHT TOE(S): ICD-10-CM

## 2023-02-02 DIAGNOSIS — L97.512 NON-PRESSURE CHRONIC ULCER OF OTHER PART OF RIGHT FOOT WITH FAT LAYER EXPOSED: ICD-10-CM

## 2023-02-02 DIAGNOSIS — Z80.8 FAMILY HISTORY OF MALIGNANT NEOPLASM OF OTHER ORGANS OR SYSTEMS: ICD-10-CM

## 2023-02-02 DIAGNOSIS — E11.610 TYPE 2 DIABETES MELLITUS WITH DIABETIC NEUROPATHIC ARTHROPATHY: ICD-10-CM

## 2023-02-02 DIAGNOSIS — E11.51 TYPE 2 DIABETES MELLITUS WITH DIABETIC PERIPHERAL ANGIOPATHY WITHOUT GANGRENE: ICD-10-CM

## 2023-02-02 DIAGNOSIS — Z83.3 FAMILY HISTORY OF DIABETES MELLITUS: ICD-10-CM

## 2023-02-02 DIAGNOSIS — Z86.73 PERSONAL HISTORY OF TRANSIENT ISCHEMIC ATTACK (TIA), AND CEREBRAL INFARCTION WITHOUT RESIDUAL DEFICITS: ICD-10-CM

## 2023-02-02 DIAGNOSIS — Z94.0 KIDNEY TRANSPLANT STATUS: ICD-10-CM

## 2023-02-02 DIAGNOSIS — E78.5 HYPERLIPIDEMIA, UNSPECIFIED: ICD-10-CM

## 2023-02-02 DIAGNOSIS — Z86.718 PERSONAL HISTORY OF OTHER VENOUS THROMBOSIS AND EMBOLISM: ICD-10-CM

## 2023-02-02 DIAGNOSIS — Z80.3 FAMILY HISTORY OF MALIGNANT NEOPLASM OF BREAST: ICD-10-CM

## 2023-02-02 DIAGNOSIS — L97.412 NON-PRESSURE CHRONIC ULCER OF RIGHT HEEL AND MIDFOOT WITH FAT LAYER EXPOSED: ICD-10-CM

## 2023-02-02 DIAGNOSIS — Z89.422 ACQUIRED ABSENCE OF OTHER LEFT TOE(S): ICD-10-CM

## 2023-02-02 NOTE — HISTORY OF PRESENT ILLNESS
[FreeTextEntry1] : Patient seen status post right Achilles tendon lengthening, right posterior tibial tendon lengthening, right peroneus brevis tendon detachment and reattachment with exostectomy of the right fifth metatarsal base and plantar cuboid, and right fifth digit proximal phalanx base resection (DOS: 1/18/2023).  Patient relates that he has not been walking on the right foot as instructed since his surgery and denies any pain at this time.  Patient denies any fever, chills, nausea, vomiting, chest pain, calf pain, or any other complaints at this time.

## 2023-02-02 NOTE — PHYSICAL EXAM
[4 x 4] : 4 x 4  [Abdominal Pad] : Abdominal Pad [0] : left 0 [1+] : left 1+ [Skin Ulcer] : ulcer [Alert] : alert [Oriented to Person] : oriented to person [Oriented to Place] : oriented to place [Oriented to Time] : oriented to time [Calm] : calm [Varicose Veins Of Lower Extremities] : not present [] : not present [Purpura] : no purpura  [Petechiae] : no petechiae [Skin Induration] : no induration [de-identified] : adult WM, NAD, alert, Ox 3. [de-identified] : HTN, HLD [de-identified] : Diabetic Charcot right foot deformity status post right Achilles tendon lengthening, right posterior tibial tendon lengthening, right peroneus brevis tendon detachment and reattachment with exostectomy of the right fifth metatarsal base and plantar cuboid, and right fifth digit proximal phalanx base resection [de-identified] : right foot plantar ulcer 5th metatarsal ulcer down to skin, subcutaneous tissue, and fat, healing. ulcer lateral head of 5th metatarsal down to skin, subcutaneous tissue, fat, healing. [de-identified] : Diabetic neuropathy  [FreeTextEntry3] : 0.4 [FreeTextEntry9] : 0.9 [de-identified] : <25% [de-identified] : >75% [de-identified] : Sutures CDI [de-identified] : 9.5 [de-identified] : Suture Line [de-identified] : Sutures intact - slight erythema [de-identified] : Pt expressed comfort post ACE application. Circ/Neuromuscular functions WNL post application. [de-identified] : Mechanically cleansed with sterile gauze and normal saline 0.9%\par Dry Dressing\par  [FreeTextEntry1] : Lateral Foot - Sutures Intact (s/p sx 1/18/23) [FreeTextEntry2] : 14.0 [FreeTextEntry4] : Suture line [de-identified] : serosanguineous [de-identified] : Sutures intact - mild erythema [de-identified] : Pt expresses comfort s/p ACE application. [de-identified] : Silver Alginate and Adaptic Touch [de-identified] : Mechanically cleansed with  0.9% normal saline and 4 x 4 sterile gauze.\par \par Kerlix [FreeTextEntry7] : Foot 5th Digit - Sutures Intact (s/p sx 1/18/23) [FreeTextEntry8] : 3.0 [de-identified] : Suture Line [de-identified] : serosanguineous [de-identified] : Sutures intact - mild erythema [de-identified] : Pt expresses comfort s/p ACE application. [de-identified] : Silver Alginate and Adaptic Touch [de-identified] : Mechanically cleansed with  0.9% normal saline and 4 x 4 sterile gauze.\par \par Kerlix [de-identified] : Right Achilles; closed  [de-identified] : none [de-identified] : no product [de-identified] : No [de-identified] : None [de-identified] : 100% [de-identified] : 2x Weekly [de-identified] : Primary Dressing [de-identified] : None [de-identified] : 2x Weekly [de-identified] : Primary Dressing [de-identified] : 2x Weekly [de-identified] : Primary Dressing [TWNoteComboBox1] : Right [TWNoteComboBox4] : Small [TWNoteComboBox5] : No [TWNoteComboBox6] : Surgical [de-identified] : No [de-identified] : other [de-identified] : None [de-identified] : None [de-identified] : None [de-identified] : No [de-identified] : Ace wraps [de-identified] : 2x Weekly [TWNoteComboBox9] : Right [de-identified] : Small [de-identified] : No [de-identified] : Surgical [de-identified] : No [de-identified] : other [de-identified] : None [de-identified] : None [de-identified] : None [de-identified] : No [de-identified] : Ace wraps [de-identified] : 2x Weekly [de-identified] : Right [de-identified] : Small [de-identified] : No [de-identified] : Surgical [de-identified] : No [de-identified] : Normal [de-identified] : None [de-identified] : None [de-identified] : None [de-identified] : No [de-identified] : Ace wraps [de-identified] : 2x Weekly

## 2023-02-02 NOTE — PLAN
[FreeTextEntry1] : Patient examined and evaluated at this time.\par Continue local wound care and offloading.\par Discussed the postoperative healing process and the need to remain nonweightbearing at this time.\par All questions answered to satisfaction and patient verbalized understanding.\par Spent 20 minutes for patient care and medical decision making.\par Patient to follow up in 1 week.\par

## 2023-02-02 NOTE — REVIEW OF SYSTEMS
[Fever] : no fever [Chills] : no chills [Eye Pain] : no eye pain [Loss Of Hearing] : no hearing loss [Shortness Of Breath] : no shortness of breath [Abdominal Pain] : no abdominal pain [Vomiting] : no vomiting [Skin Lesions] : no skin lesions [Skin Wound] : no skin wound [Anxiety] : no anxiety [Easy Bleeding] : no tendency for easy bleeding [FreeTextEntry5] : HTN,HLD [FreeTextEntry7] : 40.9 BMI , morbid obesity  [FreeTextEntry8] : Kidney Transplant  [FreeTextEntry9] : Associated Right Diabetic Charcot foot status post right Achilles tendon lengthening, right posterior tibial tendon lengthening, right peroneus brevis tendon detachment and reattachment with exostectomy of the right fifth metatarsal base and plantar cuboid, and right fifth digit proximal phalanx base resection [de-identified] : plantar right foot midfoot plantar lateral and right forefoot lateral DFU 2  , the ulcer is clean and granular s/p surgical intervention, healing well [de-identified] : IDDM with neuropathy  [de-identified] : MARILUZ

## 2023-02-02 NOTE — ASSESSMENT
[Verbal] : Verbal [Written] : Written [Demo] : Demo [Patient] : Patient [Good - alert, interested, motivated] : Good - alert, interested, motivated [Verbalizes knowledge/Understanding] : Verbalizes knowledge/understanding [Dressing changes] : dressing changes [Foot Care] : foot care [Skin Care] : skin care [Pressure relief] : pressure relief [Signs and symptoms of infection] : sign and symptoms of infection [Nutrition] : nutrition [How and When to Call] : how and when to call [Off-loading] : off-loading [Compression Therapy] : compression therapy [Patient responsibility to plan of care] : patient responsibility to plan of care [Stable] : stable [Home] : Home [Wheelchair] : Wheelchair [Not Applicable - Long Term Care/Home Health Agency] : Long Term Care/Home Health Agency: Not Applicable [] : No [FreeTextEntry2] : Infection Prevention\par Restore Skin Integrity\par Nutritious Diet to Promote Wound Healing\par Localized Wound Care\par Edema Control via Compression Therapy and Elevation\par Protect/Guard Wound Site\par Off-load and Pressure Relief\par Glycemic Control\par Daily Foot Care\par F/U in 1 week [FreeTextEntry4] : F/u Friday, 2/3/23 for wound assessment with DPM

## 2023-02-03 ENCOUNTER — APPOINTMENT (OUTPATIENT)
Dept: WOUND CARE | Facility: HOSPITAL | Age: 58
End: 2023-02-03
Payer: MEDICARE

## 2023-02-03 ENCOUNTER — OUTPATIENT (OUTPATIENT)
Dept: OUTPATIENT SERVICES | Facility: HOSPITAL | Age: 58
LOS: 1 days | Discharge: HOME | End: 2023-02-03
Payer: MEDICARE

## 2023-02-03 VITALS
RESPIRATION RATE: 18 BRPM | HEART RATE: 65 BPM | DIASTOLIC BLOOD PRESSURE: 72 MMHG | BODY MASS INDEX: 41.75 KG/M2 | SYSTOLIC BLOOD PRESSURE: 123 MMHG | WEIGHT: 315 LBS | OXYGEN SATURATION: 97 % | HEIGHT: 73 IN | TEMPERATURE: 98.8 F

## 2023-02-03 DIAGNOSIS — Z94.0 KIDNEY TRANSPLANT STATUS: Chronic | ICD-10-CM

## 2023-02-03 DIAGNOSIS — Z98.890 OTHER SPECIFIED POSTPROCEDURAL STATES: Chronic | ICD-10-CM

## 2023-02-03 DIAGNOSIS — Z89.421 ACQUIRED ABSENCE OF OTHER RIGHT TOE(S): Chronic | ICD-10-CM

## 2023-02-03 DIAGNOSIS — N18.6 END STAGE RENAL DISEASE: Chronic | ICD-10-CM

## 2023-02-03 DIAGNOSIS — M14.671 CHARCOT'S JOINT, RIGHT ANKLE AND FOOT: ICD-10-CM

## 2023-02-03 PROCEDURE — 99024 POSTOP FOLLOW-UP VISIT: CPT

## 2023-02-03 PROCEDURE — G0463: CPT

## 2023-02-07 DIAGNOSIS — Z79.4 LONG TERM (CURRENT) USE OF INSULIN: ICD-10-CM

## 2023-02-07 DIAGNOSIS — L97.412 NON-PRESSURE CHRONIC ULCER OF RIGHT HEEL AND MIDFOOT WITH FAT LAYER EXPOSED: ICD-10-CM

## 2023-02-07 DIAGNOSIS — E11.51 TYPE 2 DIABETES MELLITUS WITH DIABETIC PERIPHERAL ANGIOPATHY WITHOUT GANGRENE: ICD-10-CM

## 2023-02-07 DIAGNOSIS — Z79.899 OTHER LONG TERM (CURRENT) DRUG THERAPY: ICD-10-CM

## 2023-02-07 DIAGNOSIS — Z94.0 KIDNEY TRANSPLANT STATUS: ICD-10-CM

## 2023-02-07 DIAGNOSIS — Z80.3 FAMILY HISTORY OF MALIGNANT NEOPLASM OF BREAST: ICD-10-CM

## 2023-02-07 DIAGNOSIS — E11.621 TYPE 2 DIABETES MELLITUS WITH FOOT ULCER: ICD-10-CM

## 2023-02-07 DIAGNOSIS — Z86.73 PERSONAL HISTORY OF TRANSIENT ISCHEMIC ATTACK (TIA), AND CEREBRAL INFARCTION WITHOUT RESIDUAL DEFICITS: ICD-10-CM

## 2023-02-07 DIAGNOSIS — Z86.16 PERSONAL HISTORY OF COVID-19: ICD-10-CM

## 2023-02-07 DIAGNOSIS — E11.610 TYPE 2 DIABETES MELLITUS WITH DIABETIC NEUROPATHIC ARTHROPATHY: ICD-10-CM

## 2023-02-07 DIAGNOSIS — I10 ESSENTIAL (PRIMARY) HYPERTENSION: ICD-10-CM

## 2023-02-07 DIAGNOSIS — L97.512 NON-PRESSURE CHRONIC ULCER OF OTHER PART OF RIGHT FOOT WITH FAT LAYER EXPOSED: ICD-10-CM

## 2023-02-07 DIAGNOSIS — Z80.8 FAMILY HISTORY OF MALIGNANT NEOPLASM OF OTHER ORGANS OR SYSTEMS: ICD-10-CM

## 2023-02-07 DIAGNOSIS — Z83.3 FAMILY HISTORY OF DIABETES MELLITUS: ICD-10-CM

## 2023-02-07 DIAGNOSIS — G47.33 OBSTRUCTIVE SLEEP APNEA (ADULT) (PEDIATRIC): ICD-10-CM

## 2023-02-07 DIAGNOSIS — L84 CORNS AND CALLOSITIES: ICD-10-CM

## 2023-02-07 DIAGNOSIS — Z87.81 PERSONAL HISTORY OF (HEALED) TRAUMATIC FRACTURE: ICD-10-CM

## 2023-02-07 DIAGNOSIS — Z89.421 ACQUIRED ABSENCE OF OTHER RIGHT TOE(S): ICD-10-CM

## 2023-02-07 DIAGNOSIS — Z85.828 PERSONAL HISTORY OF OTHER MALIGNANT NEOPLASM OF SKIN: ICD-10-CM

## 2023-02-07 DIAGNOSIS — E78.5 HYPERLIPIDEMIA, UNSPECIFIED: ICD-10-CM

## 2023-02-07 DIAGNOSIS — Z86.718 PERSONAL HISTORY OF OTHER VENOUS THROMBOSIS AND EMBOLISM: ICD-10-CM

## 2023-02-07 DIAGNOSIS — Z89.422 ACQUIRED ABSENCE OF OTHER LEFT TOE(S): ICD-10-CM

## 2023-02-07 NOTE — ASSESSMENT
[Verbal] : Verbal [Demo] : Demo [Patient] : Patient [Spouse] : Spouse [Good - alert, interested, motivated] : Good - alert, interested, motivated [Verbalizes knowledge/Understanding] : Verbalizes knowledge/understanding [Dressing changes] : dressing changes [Foot Care] : foot care [Skin Care] : skin care [Pressure relief] : pressure relief [Signs and symptoms of infection] : sign and symptoms of infection [How and When to Call] : how and when to call [Labs and Tests] : labs and tests [Off-loading] : off-loading [Compression Therapy] : compression therapy [Patient responsibility to plan of care] : patient responsibility to plan of care [] : Yes [Stable] : stable [Home] : Home [Wheelchair] : Wheelchair [FreeTextEntry2] : Alteration in skin integrity- promote optimal skin integrity\par  [FreeTextEntry4] : Dr Catherine\par Pt states he followed up with Dr Mckenzie (ID) last week & no antibiotics needed at this time as per Dr Mckenzie- Dr Catherine aware\par F/U to [facility] in 1 week

## 2023-02-07 NOTE — PHYSICAL EXAM
[0] : left 0 [1+] : left 1+ [Varicose Veins Of Lower Extremities] : not present [] : not present [Purpura] : no purpura  [Petechiae] : no petechiae [Skin Ulcer] : ulcer [Skin Induration] : no induration [Alert] : alert [Oriented to Person] : oriented to person [Oriented to Place] : oriented to place [Oriented to Time] : oriented to time [Calm] : calm [de-identified] : adult WM, NAD, alert, Ox 3. [de-identified] : HTN, HLD [de-identified] : Diabetic Charcot right foot deformity status post right Achilles tendon lengthening, right posterior tibial tendon lengthening, right peroneus brevis tendon detachment and reattachment with exostectomy of the right fifth metatarsal base and plantar cuboid, and right fifth digit proximal phalanx base resection [de-identified] : right foot plantar ulcer 5th metatarsal ulcer down to skin, subcutaneous tissue, and fat, healing. ulcer lateral head of 5th metatarsal down to skin, subcutaneous tissue, fat, healing. [de-identified] : Diabetic neuropathy  [FreeTextEntry8] : 0.3 [FreeTextEntry9] : 0.4 [de-identified] : 0.2 [de-identified] : Intact [FreeTextEntry1] : Right Lateral Foot- Incision line with sutures in place (s/p Foot surgery on 01/18/23)- a few sutures removed by Dr Catherine [de-identified] : Intact [de-identified] : Dry Dressing & Kerlix [de-identified] : Cleansed with Normal saline\par  [FreeTextEntry7] : Right Foot 5th Digit- Incision line with sutures in place (s/p Foot surgery on 01/18/23)- sutures removed by Dr Catherine- one very small superficial open area [de-identified] : Scant Serosanguineous [de-identified] : Intact [de-identified] : Dry Dressing & Kerlix [de-identified] : Cleansed with Normal saline\par  [de-identified] : Right Achilles- Closed [de-identified] : No Dressing [de-identified] : \par  [de-identified] : None [de-identified] : 100% [de-identified] : No [de-identified] : None [de-identified] : 100% [de-identified] : No [TWNoteComboBox4] : None [de-identified] : None [de-identified] : None [de-identified] : Ace wraps [de-identified] : None [de-identified] : None [de-identified] : Ace wraps [de-identified] : None [de-identified] : None [de-identified] : None [de-identified] : Ace wraps

## 2023-02-07 NOTE — REVIEW OF SYSTEMS
[Fever] : no fever [Chills] : no chills [Eye Pain] : no eye pain [Loss Of Hearing] : no hearing loss [Shortness Of Breath] : no shortness of breath [Abdominal Pain] : no abdominal pain [Vomiting] : no vomiting [Skin Lesions] : no skin lesions [Skin Wound] : no skin wound [Anxiety] : no anxiety [Easy Bleeding] : no tendency for easy bleeding [FreeTextEntry5] : HTN,HLD [FreeTextEntry7] : 40.9 BMI , morbid obesity  [FreeTextEntry8] : Kidney Transplant  [FreeTextEntry9] : Associated Right Diabetic Charcot foot status post right Achilles tendon lengthening, right posterior tibial tendon lengthening, right peroneus brevis tendon detachment and reattachment with exostectomy of the right fifth metatarsal base and plantar cuboid, and right fifth digit proximal phalanx base resection [de-identified] : plantar right foot midfoot plantar lateral and right forefoot lateral DFU 2  , the ulcer is clean and granular s/p surgical intervention, healing well [de-identified] : IDDM with neuropathy  [de-identified] : MARILUZ

## 2023-02-08 ENCOUNTER — NON-APPOINTMENT (OUTPATIENT)
Age: 58
End: 2023-02-08

## 2023-02-09 ENCOUNTER — OUTPATIENT (OUTPATIENT)
Dept: OUTPATIENT SERVICES | Facility: HOSPITAL | Age: 58
LOS: 1 days | Discharge: HOME | End: 2023-02-09
Payer: MEDICARE

## 2023-02-09 ENCOUNTER — APPOINTMENT (OUTPATIENT)
Dept: WOUND CARE | Facility: HOSPITAL | Age: 58
End: 2023-02-09
Payer: MEDICARE

## 2023-02-09 VITALS
TEMPERATURE: 97.8 F | WEIGHT: 315 LBS | SYSTOLIC BLOOD PRESSURE: 169 MMHG | OXYGEN SATURATION: 98 % | DIASTOLIC BLOOD PRESSURE: 82 MMHG | BODY MASS INDEX: 41.75 KG/M2 | HEART RATE: 75 BPM | RESPIRATION RATE: 18 BRPM | HEIGHT: 73 IN

## 2023-02-09 DIAGNOSIS — M14.671 CHARCOT'S JOINT, RIGHT ANKLE AND FOOT: ICD-10-CM

## 2023-02-09 PROCEDURE — G0463: CPT

## 2023-02-09 PROCEDURE — 99213 OFFICE O/P EST LOW 20 MIN: CPT | Mod: 24

## 2023-02-15 DIAGNOSIS — G47.33 OBSTRUCTIVE SLEEP APNEA (ADULT) (PEDIATRIC): ICD-10-CM

## 2023-02-15 DIAGNOSIS — I10 ESSENTIAL (PRIMARY) HYPERTENSION: ICD-10-CM

## 2023-02-15 DIAGNOSIS — E78.5 HYPERLIPIDEMIA, UNSPECIFIED: ICD-10-CM

## 2023-02-15 DIAGNOSIS — L97.512 NON-PRESSURE CHRONIC ULCER OF OTHER PART OF RIGHT FOOT WITH FAT LAYER EXPOSED: ICD-10-CM

## 2023-02-15 DIAGNOSIS — Z86.73 PERSONAL HISTORY OF TRANSIENT ISCHEMIC ATTACK (TIA), AND CEREBRAL INFARCTION WITHOUT RESIDUAL DEFICITS: ICD-10-CM

## 2023-02-15 DIAGNOSIS — Z80.3 FAMILY HISTORY OF MALIGNANT NEOPLASM OF BREAST: ICD-10-CM

## 2023-02-15 DIAGNOSIS — Z94.0 KIDNEY TRANSPLANT STATUS: ICD-10-CM

## 2023-02-15 DIAGNOSIS — Z79.4 LONG TERM (CURRENT) USE OF INSULIN: ICD-10-CM

## 2023-02-15 DIAGNOSIS — Z86.718 PERSONAL HISTORY OF OTHER VENOUS THROMBOSIS AND EMBOLISM: ICD-10-CM

## 2023-02-15 DIAGNOSIS — E11.621 TYPE 2 DIABETES MELLITUS WITH FOOT ULCER: ICD-10-CM

## 2023-02-15 DIAGNOSIS — Z85.828 PERSONAL HISTORY OF OTHER MALIGNANT NEOPLASM OF SKIN: ICD-10-CM

## 2023-02-15 DIAGNOSIS — Z79.899 OTHER LONG TERM (CURRENT) DRUG THERAPY: ICD-10-CM

## 2023-02-15 DIAGNOSIS — L84 CORNS AND CALLOSITIES: ICD-10-CM

## 2023-02-15 DIAGNOSIS — Z89.422 ACQUIRED ABSENCE OF OTHER LEFT TOE(S): ICD-10-CM

## 2023-02-15 DIAGNOSIS — E11.51 TYPE 2 DIABETES MELLITUS WITH DIABETIC PERIPHERAL ANGIOPATHY WITHOUT GANGRENE: ICD-10-CM

## 2023-02-15 DIAGNOSIS — Z89.421 ACQUIRED ABSENCE OF OTHER RIGHT TOE(S): ICD-10-CM

## 2023-02-15 DIAGNOSIS — Z86.16 PERSONAL HISTORY OF COVID-19: ICD-10-CM

## 2023-02-15 DIAGNOSIS — L97.412 NON-PRESSURE CHRONIC ULCER OF RIGHT HEEL AND MIDFOOT WITH FAT LAYER EXPOSED: ICD-10-CM

## 2023-02-15 DIAGNOSIS — Z80.8 FAMILY HISTORY OF MALIGNANT NEOPLASM OF OTHER ORGANS OR SYSTEMS: ICD-10-CM

## 2023-02-15 DIAGNOSIS — Z83.3 FAMILY HISTORY OF DIABETES MELLITUS: ICD-10-CM

## 2023-02-15 DIAGNOSIS — Z87.81 PERSONAL HISTORY OF (HEALED) TRAUMATIC FRACTURE: ICD-10-CM

## 2023-02-15 DIAGNOSIS — E11.610 TYPE 2 DIABETES MELLITUS WITH DIABETIC NEUROPATHIC ARTHROPATHY: ICD-10-CM

## 2023-02-15 NOTE — PHYSICAL EXAM
[0] : left 0 [1+] : left 1+ [Varicose Veins Of Lower Extremities] : not present [] : not present [Purpura] : no purpura  [Petechiae] : no petechiae [Skin Ulcer] : ulcer [Skin Induration] : no induration [Alert] : alert [Oriented to Person] : oriented to person [Oriented to Place] : oriented to place [Oriented to Time] : oriented to time [Calm] : calm [de-identified] : adult WM, NAD, alert, Ox 3. [de-identified] : HTN, HLD [de-identified] : Diabetic Charcot right foot deformity status post right Achilles tendon lengthening, right posterior tibial tendon lengthening, right peroneus brevis tendon detachment and reattachment with exostectomy of the right fifth metatarsal base and plantar cuboid, and right fifth digit proximal phalanx base resection [de-identified] : right foot plantar ulcer 5th metatarsal ulcer down to skin, subcutaneous tissue, and fat, healing. ulcer lateral head of 5th metatarsal down to skin, subcutaneous tissue, fat, healing. [de-identified] : Diabetic neuropathy  [de-identified] : Right Medial Foot- Incision line D&I with sutures in place (s/p Foot surgery on 01/18/23)- remaining sutures removed by Dr Catherine- Incision line Closed [de-identified] : No Dressing/ White cotton socks [FreeTextEntry1] : Right Lateral Foot- Incision line D&I  with sutures in place (s/p Foot surgery on 01/18/23)- remaining sutures removed by Dr Catherine [de-identified] : No Dressing/ White cotton socks [de-identified] : Cleansed with Normal saline\par  [FreeTextEntry7] : Right Foot 5th Digit Incision line- one very small superficial open area present [FreeTextEntry8] : 0.2 [FreeTextEntry9] : 0.1 [de-identified] : 0.1 [de-identified] : Scant Serosanguineous [de-identified] : Intact [de-identified] : Silver Alginate/ Dry Dressing  [de-identified] : Cleansed with Normal saline\par  [de-identified] : \par  [de-identified] : None [de-identified] : No [de-identified] : None [de-identified] : None [de-identified] : 100% [de-identified] : No [de-identified] : Ace wraps [de-identified] : None [de-identified] : None [de-identified] : None [TWNoteComboBox4] : None [de-identified] : None [de-identified] : None [de-identified] : Ace wraps [de-identified] : Ace wraps

## 2023-02-15 NOTE — REVIEW OF SYSTEMS
[Fever] : no fever [Chills] : no chills [Eye Pain] : no eye pain [Loss Of Hearing] : no hearing loss [Shortness Of Breath] : no shortness of breath [Abdominal Pain] : no abdominal pain [Vomiting] : no vomiting [Skin Lesions] : no skin lesions [Skin Wound] : no skin wound [Anxiety] : no anxiety [Easy Bleeding] : no tendency for easy bleeding [FreeTextEntry5] : HTN,HLD [FreeTextEntry7] : 40.9 BMI , morbid obesity  [FreeTextEntry8] : Kidney Transplant  [FreeTextEntry9] : Associated Right Diabetic Charcot foot status post right Achilles tendon lengthening, right posterior tibial tendon lengthening, right peroneus brevis tendon detachment and reattachment with exostectomy of the right fifth metatarsal base and plantar cuboid, and right fifth digit proximal phalanx base resection [de-identified] : plantar right foot midfoot plantar lateral and right forefoot lateral DFU 2  , the ulcer is clean and granular s/p surgical intervention, healing well [de-identified] : IDDM with neuropathy  [de-identified] : MARILUZ

## 2023-02-15 NOTE — ASSESSMENT
[Verbal] : Verbal [Demo] : Demo [Patient] : Patient [Spouse] : Spouse [Good - alert, interested, motivated] : Good - alert, interested, motivated [Verbalizes knowledge/Understanding] : Verbalizes knowledge/understanding [Dressing changes] : dressing changes [Foot Care] : foot care [Skin Care] : skin care [Pressure relief] : pressure relief [Signs and symptoms of infection] : sign and symptoms of infection [How and When to Call] : how and when to call [Off-loading] : off-loading [Compression Therapy] : compression therapy [Patient responsibility to plan of care] : patient responsibility to plan of care [] : Yes [Stable] : stable [Home] : Home [Wheelchair] : Wheelchair [FreeTextEntry2] : Alteration in skin integrity- promote optimal skin integrity\par  [FreeTextEntry4] : Dr Catherine\par F/U to [facility] in 2 weeks

## 2023-02-16 ENCOUNTER — APPOINTMENT (OUTPATIENT)
Dept: WOUND CARE | Facility: HOSPITAL | Age: 58
End: 2023-02-16

## 2023-02-23 ENCOUNTER — OUTPATIENT (OUTPATIENT)
Dept: OUTPATIENT SERVICES | Facility: HOSPITAL | Age: 58
LOS: 1 days | Discharge: HOME | End: 2023-02-23
Payer: MEDICARE

## 2023-02-23 ENCOUNTER — APPOINTMENT (OUTPATIENT)
Dept: WOUND CARE | Facility: HOSPITAL | Age: 58
End: 2023-02-23
Payer: MEDICARE

## 2023-02-23 VITALS
DIASTOLIC BLOOD PRESSURE: 80 MMHG | RESPIRATION RATE: 18 BRPM | SYSTOLIC BLOOD PRESSURE: 197 MMHG | BODY MASS INDEX: 41.75 KG/M2 | HEART RATE: 73 BPM | HEIGHT: 73 IN | WEIGHT: 315 LBS | TEMPERATURE: 98.2 F | OXYGEN SATURATION: 97 %

## 2023-02-23 DIAGNOSIS — Z94.0 KIDNEY TRANSPLANT STATUS: Chronic | ICD-10-CM

## 2023-02-23 DIAGNOSIS — M14.671 CHARCOT'S JOINT, RIGHT ANKLE AND FOOT: ICD-10-CM

## 2023-02-23 DIAGNOSIS — E11.621 TYPE 2 DIABETES MELLITUS WITH FOOT ULCER: ICD-10-CM

## 2023-02-23 DIAGNOSIS — Z98.890 OTHER SPECIFIED POSTPROCEDURAL STATES: Chronic | ICD-10-CM

## 2023-02-23 DIAGNOSIS — Z89.421 ACQUIRED ABSENCE OF OTHER RIGHT TOE(S): Chronic | ICD-10-CM

## 2023-02-23 DIAGNOSIS — N18.6 END STAGE RENAL DISEASE: Chronic | ICD-10-CM

## 2023-02-23 PROCEDURE — G0463: CPT

## 2023-02-23 PROCEDURE — 99024 POSTOP FOLLOW-UP VISIT: CPT

## 2023-02-24 NOTE — REVIEW OF SYSTEMS
[Fever] : no fever [Chills] : no chills [Eye Pain] : no eye pain [Loss Of Hearing] : no hearing loss [Shortness Of Breath] : no shortness of breath [Abdominal Pain] : no abdominal pain [Vomiting] : no vomiting [Skin Lesions] : no skin lesions [Skin Wound] : no skin wound [Anxiety] : no anxiety [Easy Bleeding] : no tendency for easy bleeding [FreeTextEntry5] : HTN,HLD [FreeTextEntry7] : 40.9 BMI , morbid obesity  [FreeTextEntry8] : Kidney Transplant  [FreeTextEntry9] : Associated Right Diabetic Charcot foot status post right Achilles tendon lengthening, right posterior tibial tendon lengthening, right peroneus brevis tendon detachment and reattachment with exostectomy of the right fifth metatarsal base and plantar cuboid, and right fifth digit proximal phalanx base resection [de-identified] : plantar right foot midfoot plantar lateral and right forefoot lateral DFU 2  , the ulcer is clean and granular s/p surgical intervention, healing well [de-identified] : IDDM with neuropathy  [de-identified] : MARILUZ

## 2023-02-24 NOTE — ASSESSMENT
[Verbal] : Verbal [Written] : Written [Demo] : Demo [Patient] : Patient [Good - alert, interested, motivated] : Good - alert, interested, motivated [Verbalizes knowledge/Understanding] : Verbalizes knowledge/understanding [Dressing changes] : dressing changes [Foot Care] : foot care [Skin Care] : skin care [Pressure relief] : pressure relief [Signs and symptoms of infection] : sign and symptoms of infection [Nutrition] : nutrition [How and When to Call] : how and when to call [Compression Therapy] : compression therapy [Off-loading] : off-loading [Patient responsibility to plan of care] : patient responsibility to plan of care [] : Yes [Stable] : stable [Home] : Home [Wheelchair] : Wheelchair [Not Applicable - Long Term Care/Home Health Agency] : Long Term Care/Home Health Agency: Not Applicable [FreeTextEntry2] : Infection Prevention\par Restore Skin Integrity\par Nutritious Diet to Promote Wound Healing\par Localized Wound Care\par Edema Control via Compression Therapy and Elevation\par Protect/Guard Wound Site\par Off-load and Pressure Relief\par Glycemic Control\par Daily Foot Care\par F/U in 1 week\par  [FreeTextEntry4] : DPM discussed with pt importance of avoiding putting pressure to right foot.\par DPM discussed with pt importance of compression therapy and elevation of lower extremities.\par All sutures removed from right foot Achilles tendon by DPM.\par DPM discussed with pt possible removal of some more sutures from right foot.\par DPM advised pt to get blood tests done as ordered by ERIC Saucedo and to follow up with Dr. Saucedo once results read.\par Pt verbalized understanding of instructions.\par F/U on Tuesday 1/31/23 for visit and dressing change.

## 2023-02-24 NOTE — PHYSICAL EXAM
[0] : left 0 [1+] : left 1+ [Skin Ulcer] : ulcer [Alert] : alert [Oriented to Person] : oriented to person [Oriented to Place] : oriented to place [Oriented to Time] : oriented to time [Calm] : calm [4 x 4] : 4 x 4  [Abdominal Pad] : Abdominal Pad [Varicose Veins Of Lower Extremities] : not present [] : not present [Purpura] : no purpura  [Petechiae] : no petechiae [Skin Induration] : no induration [de-identified] : adult WM, NAD, alert, Ox 3. [de-identified] : HTN, HLD [de-identified] : Diabetic Charcot right foot deformity status post right Achilles tendon lengthening, right posterior tibial tendon lengthening, right peroneus brevis tendon detachment and reattachment with exostectomy of the right fifth metatarsal base and plantar cuboid, and right fifth digit proximal phalanx base resection [de-identified] : right foot plantar ulcer 5th metatarsal ulcer down to skin, subcutaneous tissue, and fat, healing. ulcer lateral head of 5th metatarsal down to skin, subcutaneous tissue, fat, healing. [de-identified] : Diabetic neuropathy  [FreeTextEntry3] : 0.4 [FreeTextEntry9] : 0.9 [de-identified] : <25% [de-identified] : >75% [FreeTextEntry1] : Lateral Foot - Sutures Intact (s/p sx 1/18/23) [FreeTextEntry2] : 14.0 [FreeTextEntry4] : Suture line [de-identified] : serosanguineous [de-identified] : Sutures intact - mild erythema [de-identified] : Pt expresses comfort s/p ACE application. [de-identified] : Silver Alginate and Adaptic Touch [de-identified] : Mechanically cleansed with  0.9% normal saline and 4 x 4 sterile gauze.\par \par Kerlix [FreeTextEntry7] : Foot 5th Digit - Sutures Intact (s/p sx 1/18/23) [FreeTextEntry8] : 3.0 [de-identified] : Suture Line [de-identified] : serosanguineous [de-identified] : Sutures intact - mild erythema [de-identified] : Pt expresses comfort s/p ACE application. [de-identified] : Silver Alginate and Adaptic Touch [de-identified] : Mechanically cleansed with  0.9% normal saline and 4 x 4 sterile gauze.\par \par Kerlix [de-identified] : All sutures removed from left foot achilles tendon by DPM at this visit (1/26/23). [de-identified] : Achilles Tendon - 3 single sutures intact and within measurement [de-identified] : 5.0 [de-identified] : sutures [de-identified] : serosanguineous [de-identified] : Sutures Intact  [de-identified] : Pt expresses comfort s/p ACE application. [de-identified] : Silver Alginate and Adaptic Touch [de-identified] : Mechanically cleansed with  0.9% normal saline and 4 x 4 sterile gauze.\par \par Kerlix [de-identified] : No [de-identified] : None [de-identified] : 100% [de-identified] : 2x Weekly [de-identified] : Primary Dressing [de-identified] : None [de-identified] : 2x Weekly [de-identified] : Primary Dressing [de-identified] : 2x Weekly [TWNoteComboBox1] : Right [TWNoteComboBox4] : Small [TWNoteComboBox5] : No [TWNoteComboBox6] : Surgical [de-identified] : No [de-identified] : other [de-identified] : None [de-identified] : None [de-identified] : None [de-identified] : No [de-identified] : Ace wraps [de-identified] : 2x Weekly [de-identified] : Primary Dressing [TWNoteComboBox9] : Right [de-identified] : Small [de-identified] : No [de-identified] : Surgical [de-identified] : No [de-identified] : other [de-identified] : None [de-identified] : None [de-identified] : None [de-identified] : No [de-identified] : Ace wraps [de-identified] : 2x Weekly [de-identified] : Primary Dressing [de-identified] : Right [de-identified] : Small [de-identified] : No [de-identified] : Surgical [de-identified] : No [de-identified] : Normal [de-identified] : None [de-identified] : None [de-identified] : None [de-identified] : No [de-identified] : Ace wraps [de-identified] : 2x Weekly [de-identified] : Primary Dressing

## 2023-02-28 NOTE — ASSESSMENT
[Verbal] : Verbal [Written] : Written [Demo] : Demo [Patient] : Patient [Spouse] : Spouse [Good - alert, interested, motivated] : Good - alert, interested, motivated [Verbalizes knowledge/Understanding] : Verbalizes knowledge/understanding [Dressing changes] : dressing changes [Foot Care] : foot care [Skin Care] : skin care [Signs and symptoms of infection] : sign and symptoms of infection [Nutrition] : nutrition [How and When to Call] : how and when to call [Off-loading] : off-loading [Patient responsibility to plan of care] : patient responsibility to plan of care [Glycemic Control] : glycemic control [] : Yes [Stable] : stable [Home] : Home [Wheelchair] : Wheelchair [FreeTextEntry2] : Infection prevention \par Wound care (dressing changes)\par Maintain optimal skin integrity to high pressure areas\par Compression therapy\par Nutrition and wound healing\par Elevation and low sodium compliance.\par Offloading the stress on skin structures and decreasing potential pathologic biomechanical influences.\par PT will maintain BP within individually acceptable range.\par Weight reduction strategies  [FreeTextEntry4] : Per DPM, Pt to start walking/standing. Pt can use walker for assistance \par F/u in 1 week

## 2023-02-28 NOTE — PLAN
[FreeTextEntry1] : Patient examined and evaluated at this time.\par Continue local wound care at this time. Pt to begin protected partial weight bearing to the right foot in a surgical shoe at this time.\par Discussed the postoperative healing process and all questions answered to satisfaction and patient verbalized understanding.\par Spent 20 minutes for patient care and medical decision making.\par Patient to follow up in 1 week.\par

## 2023-02-28 NOTE — PHYSICAL EXAM
[4 x 4] : 4 x 4  [0] : left 0 [1+] : left 1+ [Varicose Veins Of Lower Extremities] : not present [] : not present [Purpura] : no purpura  [Petechiae] : no petechiae [Skin Ulcer] : ulcer [Skin Induration] : no induration [Alert] : alert [Oriented to Person] : oriented to person [Oriented to Place] : oriented to place [Oriented to Time] : oriented to time [Calm] : calm [de-identified] : adult WM, NAD, alert, Ox 3. [de-identified] : HTN, HLD [de-identified] : Diabetic Charcot right foot deformity status post right Achilles tendon lengthening, right posterior tibial tendon lengthening, right peroneus brevis tendon detachment and reattachment with exostectomy of the right fifth metatarsal base and plantar cuboid, and right fifth digit proximal phalanx base resection [de-identified] : right foot plantar ulcer 5th metatarsal ulcer epithelialized. ulcer lateral head of 5th metatarsal down to skin, subcutaneous tissue, fat, healing. [de-identified] : Diabetic neuropathy  [de-identified] : DPM Cauterized wound with (2) silver nitrate sticks. [FreeTextEntry1] : Right Lateral Foot, 5th Digit [FreeTextEntry2] : 0.6 [FreeTextEntry3] : 0.4 [FreeTextEntry4] : 0.6 [de-identified] : small serosanguineous [de-identified] : callused; DPM shaved callus [de-identified] : 100% [de-identified] : Alginate Ag [de-identified] : Mechanically cleansed with sterile gauze and normal saline 0.9%\par Dry Dressing\par  [TWNoteComboBox5] : No [TWNoteComboBox6] : Surgical [de-identified] : No [de-identified] : other [de-identified] : None [de-identified] : Daily [de-identified] : Primary Dressing

## 2023-02-28 NOTE — REVIEW OF SYSTEMS
[Fever] : no fever [Chills] : no chills [Eye Pain] : no eye pain [Loss Of Hearing] : no hearing loss [Shortness Of Breath] : no shortness of breath [Abdominal Pain] : no abdominal pain [Vomiting] : no vomiting [Skin Lesions] : no skin lesions [Skin Wound] : no skin wound [Anxiety] : no anxiety [Easy Bleeding] : no tendency for easy bleeding [FreeTextEntry5] : HTN,HLD [FreeTextEntry7] : 40.9 BMI , morbid obesity  [FreeTextEntry8] : Kidney Transplant  [FreeTextEntry9] : Associated Right Diabetic Charcot foot status post right Achilles tendon lengthening, right posterior tibial tendon lengthening, right peroneus brevis tendon detachment and reattachment with exostectomy of the right fifth metatarsal base and plantar cuboid, and right fifth digit proximal phalanx base resection [de-identified] : plantar right foot midfoot plantar lateral and right forefoot lateral DFU 2  , the ulcer is clean and granular s/p surgical intervention, healing well [de-identified] : IDDM with neuropathy  [de-identified] : MARILUZ

## 2023-03-02 ENCOUNTER — APPOINTMENT (OUTPATIENT)
Dept: WOUND CARE | Facility: HOSPITAL | Age: 58
End: 2023-03-02
Payer: MEDICARE

## 2023-03-02 ENCOUNTER — OUTPATIENT (OUTPATIENT)
Dept: OUTPATIENT SERVICES | Facility: HOSPITAL | Age: 58
LOS: 1 days | Discharge: HOME | End: 2023-03-02
Payer: MEDICARE

## 2023-03-02 VITALS
HEART RATE: 76 BPM | TEMPERATURE: 98.3 F | RESPIRATION RATE: 20 BRPM | OXYGEN SATURATION: 97 % | DIASTOLIC BLOOD PRESSURE: 81 MMHG | SYSTOLIC BLOOD PRESSURE: 178 MMHG

## 2023-03-02 DIAGNOSIS — Z80.3 FAMILY HISTORY OF MALIGNANT NEOPLASM OF BREAST: ICD-10-CM

## 2023-03-02 DIAGNOSIS — E11.51 TYPE 2 DIABETES MELLITUS WITH DIABETIC PERIPHERAL ANGIOPATHY WITHOUT GANGRENE: ICD-10-CM

## 2023-03-02 DIAGNOSIS — Z80.8 FAMILY HISTORY OF MALIGNANT NEOPLASM OF OTHER ORGANS OR SYSTEMS: ICD-10-CM

## 2023-03-02 DIAGNOSIS — Z86.718 PERSONAL HISTORY OF OTHER VENOUS THROMBOSIS AND EMBOLISM: ICD-10-CM

## 2023-03-02 DIAGNOSIS — L84 CORNS AND CALLOSITIES: ICD-10-CM

## 2023-03-02 DIAGNOSIS — L97.412 NON-PRESSURE CHRONIC ULCER OF RIGHT HEEL AND MIDFOOT WITH FAT LAYER EXPOSED: ICD-10-CM

## 2023-03-02 DIAGNOSIS — Z79.899 OTHER LONG TERM (CURRENT) DRUG THERAPY: ICD-10-CM

## 2023-03-02 DIAGNOSIS — E78.5 HYPERLIPIDEMIA, UNSPECIFIED: ICD-10-CM

## 2023-03-02 DIAGNOSIS — G47.33 OBSTRUCTIVE SLEEP APNEA (ADULT) (PEDIATRIC): ICD-10-CM

## 2023-03-02 DIAGNOSIS — Z87.81 PERSONAL HISTORY OF (HEALED) TRAUMATIC FRACTURE: ICD-10-CM

## 2023-03-02 DIAGNOSIS — N18.6 END STAGE RENAL DISEASE: Chronic | ICD-10-CM

## 2023-03-02 DIAGNOSIS — I10 ESSENTIAL (PRIMARY) HYPERTENSION: ICD-10-CM

## 2023-03-02 DIAGNOSIS — Z85.828 PERSONAL HISTORY OF OTHER MALIGNANT NEOPLASM OF SKIN: ICD-10-CM

## 2023-03-02 DIAGNOSIS — L97.512 NON-PRESSURE CHRONIC ULCER OF OTHER PART OF RIGHT FOOT WITH FAT LAYER EXPOSED: ICD-10-CM

## 2023-03-02 DIAGNOSIS — Z79.4 LONG TERM (CURRENT) USE OF INSULIN: ICD-10-CM

## 2023-03-02 DIAGNOSIS — Z94.0 KIDNEY TRANSPLANT STATUS: Chronic | ICD-10-CM

## 2023-03-02 DIAGNOSIS — Z98.890 OTHER SPECIFIED POSTPROCEDURAL STATES: Chronic | ICD-10-CM

## 2023-03-02 DIAGNOSIS — Z83.3 FAMILY HISTORY OF DIABETES MELLITUS: ICD-10-CM

## 2023-03-02 DIAGNOSIS — Z89.421 ACQUIRED ABSENCE OF OTHER RIGHT TOE(S): Chronic | ICD-10-CM

## 2023-03-02 DIAGNOSIS — Z89.421 ACQUIRED ABSENCE OF OTHER RIGHT TOE(S): ICD-10-CM

## 2023-03-02 DIAGNOSIS — M14.671 CHARCOT'S JOINT, RIGHT ANKLE AND FOOT: ICD-10-CM

## 2023-03-02 DIAGNOSIS — Z86.16 PERSONAL HISTORY OF COVID-19: ICD-10-CM

## 2023-03-02 DIAGNOSIS — E11.621 TYPE 2 DIABETES MELLITUS WITH FOOT ULCER: ICD-10-CM

## 2023-03-02 DIAGNOSIS — Z89.422 ACQUIRED ABSENCE OF OTHER LEFT TOE(S): ICD-10-CM

## 2023-03-02 DIAGNOSIS — Z86.73 PERSONAL HISTORY OF TRANSIENT ISCHEMIC ATTACK (TIA), AND CEREBRAL INFARCTION WITHOUT RESIDUAL DEFICITS: ICD-10-CM

## 2023-03-02 DIAGNOSIS — E11.610 TYPE 2 DIABETES MELLITUS WITH DIABETIC NEUROPATHIC ARTHROPATHY: ICD-10-CM

## 2023-03-02 DIAGNOSIS — Z94.0 KIDNEY TRANSPLANT STATUS: ICD-10-CM

## 2023-03-02 PROCEDURE — G0463: CPT

## 2023-03-02 PROCEDURE — 99024 POSTOP FOLLOW-UP VISIT: CPT

## 2023-03-03 NOTE — PHYSICAL EXAM
[0] : left 0 [1+] : left 1+ [Varicose Veins Of Lower Extremities] : not present [] : not present [Purpura] : no purpura  [Petechiae] : no petechiae [Skin Ulcer] : ulcer [Skin Induration] : no induration [Alert] : alert [Oriented to Person] : oriented to person [Oriented to Place] : oriented to place [Oriented to Time] : oriented to time [Calm] : calm [de-identified] : adult WM, NAD, alert, Ox 3. [de-identified] : HTN, HLD [de-identified] : Diabetic Charcot right foot deformity status post right Achilles tendon lengthening, right posterior tibial tendon lengthening, right peroneus brevis tendon detachment and reattachment with exostectomy of the right fifth metatarsal base and plantar cuboid, and right fifth digit proximal phalanx base resection [de-identified] : right foot plantar ulcer 5th metatarsal ulcer epithelialized. ulcer lateral head of 5th metatarsal epithelialized. [de-identified] : Diabetic neuropathy  [FreeTextEntry1] : Lateral Foot, 5th Digit - CLOSED - SCAB [FreeTextEntry2] : 0.6 [FreeTextEntry3] : 0.3 [FreeTextEntry4] : <0.1 [de-identified] : Callus [de-identified] : Pt expresses comfort s/p ACE wrap application.  [de-identified] : No Treatment Required [de-identified] : Mechanically cleansed with  0.9% normal saline and 4 x 4 sterile gauze.  [TWNoteComboBox1] : Right [TWNoteComboBox4] : None [TWNoteComboBox5] : No [TWNoteComboBox6] : Surgical [de-identified] : No [de-identified] : other [de-identified] : None [de-identified] : None [de-identified] : None [de-identified] : No [de-identified] : Ace wraps [de-identified] : Daily [de-identified] : Primary Dressing

## 2023-03-03 NOTE — PLAN
[FreeTextEntry1] : Patient examined and evaluated at this time.\par Continue local wound care at this time. Pt to begin weight bearing to the right foot as tolerated at this time.\par Orthotist consult at this time.\par Discussed the postoperative healing process and all questions answered to satisfaction and patient verbalized understanding.\par Spent 20 minutes for patient care and medical decision making.\par Patient to follow up in 1 week.\par

## 2023-03-03 NOTE — REVIEW OF SYSTEMS
[Fever] : no fever [Chills] : no chills [Eye Pain] : no eye pain [Loss Of Hearing] : no hearing loss [Shortness Of Breath] : no shortness of breath [Abdominal Pain] : no abdominal pain [Vomiting] : no vomiting [Skin Lesions] : no skin lesions [Skin Wound] : no skin wound [Anxiety] : no anxiety [Easy Bleeding] : no tendency for easy bleeding [FreeTextEntry5] : HTN,HLD [FreeTextEntry7] : 40.9 BMI , morbid obesity  [FreeTextEntry8] : Kidney Transplant  [FreeTextEntry9] : Associated Right Diabetic Charcot foot status post right Achilles tendon lengthening, right posterior tibial tendon lengthening, right peroneus brevis tendon detachment and reattachment with exostectomy of the right fifth metatarsal base and plantar cuboid, and right fifth digit proximal phalanx base resection [de-identified] : plantar right foot midfoot plantar lateral and right forefoot lateral DFU 2  , the ulcer is clean and granular s/p surgical intervention, healing well [de-identified] : IDDM with neuropathy  [de-identified] : MARILUZ

## 2023-03-03 NOTE — ASSESSMENT
[Verbal] : Verbal [Written] : Written [Demo] : Demo [Patient] : Patient [Spouse] : Spouse [Good - alert, interested, motivated] : Good - alert, interested, motivated [Verbalizes knowledge/Understanding] : Verbalizes knowledge/understanding [Dressing changes] : dressing changes [Foot Care] : foot care [Skin Care] : skin care [Signs and symptoms of infection] : sign and symptoms of infection [Nutrition] : nutrition [How and When to Call] : how and when to call [Off-loading] : off-loading [Patient responsibility to plan of care] : patient responsibility to plan of care [Glycemic Control] : glycemic control [] : Yes [Compression Therapy] : compression therapy [Stable] : stable [Home] : Home [Walker] : Walker [Not Applicable - Long Term Care/Home Health Agency] : Long Term Care/Home Health Agency: Not Applicable [FreeTextEntry2] : Infection Prevention\par Maintain Skin Integrity\par Nutritious Diet to Promote Wound Healing\par Localized Wound Care\par Edema Control via Compression Therapy and Elevation\par Protect/Guard Wound Site\par Off-load and Pressure Relief\par Glycemic Control\par Daily Foot Care\par F/U in 1 Week\par  [FreeTextEntry4] : DPM discussed with pt to increase ambulation as tolerated with walker; pt may begin to use stairs; pt may begin to shower and get foot wet and instructed to air dry; pt to begin using orthopedic shoe - NO LONGER TO USE BOOT; pt and spouse verbalized understanding.\par Orthotist consult; auth submitted. \par Wound closed - no treatment required - only ACE wraps. \par F/u in 1 week

## 2023-03-03 NOTE — HISTORY OF PRESENT ILLNESS
[FreeTextEntry1] : Patient seen status post right Achilles tendon lengthening, right posterior tibial tendon lengthening, right peroneus brevis tendon detachment and reattachment with exostectomy of the right fifth metatarsal base and plantar cuboid, and right fifth digit proximal phalanx base resection (DOS: 1/18/2023).  Patient relates that he has been walking on the right foot with a walker in a surgical shoe as instructed since the last encounter.  Patient denies any fever, chills, nausea, vomiting, chest pain, calf pain, or any other complaints at this time.

## 2023-03-05 DIAGNOSIS — L97.412 NON-PRESSURE CHRONIC ULCER OF RIGHT HEEL AND MIDFOOT WITH FAT LAYER EXPOSED: ICD-10-CM

## 2023-03-05 DIAGNOSIS — Z86.73 PERSONAL HISTORY OF TRANSIENT ISCHEMIC ATTACK (TIA), AND CEREBRAL INFARCTION WITHOUT RESIDUAL DEFICITS: ICD-10-CM

## 2023-03-05 DIAGNOSIS — Z79.82 LONG TERM (CURRENT) USE OF ASPIRIN: ICD-10-CM

## 2023-03-05 DIAGNOSIS — Z89.422 ACQUIRED ABSENCE OF OTHER LEFT TOE(S): ICD-10-CM

## 2023-03-05 DIAGNOSIS — Z86.16 PERSONAL HISTORY OF COVID-19: ICD-10-CM

## 2023-03-05 DIAGNOSIS — E11.51 TYPE 2 DIABETES MELLITUS WITH DIABETIC PERIPHERAL ANGIOPATHY WITHOUT GANGRENE: ICD-10-CM

## 2023-03-05 DIAGNOSIS — Z85.828 PERSONAL HISTORY OF OTHER MALIGNANT NEOPLASM OF SKIN: ICD-10-CM

## 2023-03-05 DIAGNOSIS — G47.33 OBSTRUCTIVE SLEEP APNEA (ADULT) (PEDIATRIC): ICD-10-CM

## 2023-03-05 DIAGNOSIS — Z83.3 FAMILY HISTORY OF DIABETES MELLITUS: ICD-10-CM

## 2023-03-05 DIAGNOSIS — Z86.718 PERSONAL HISTORY OF OTHER VENOUS THROMBOSIS AND EMBOLISM: ICD-10-CM

## 2023-03-05 DIAGNOSIS — E11.621 TYPE 2 DIABETES MELLITUS WITH FOOT ULCER: ICD-10-CM

## 2023-03-05 DIAGNOSIS — Z80.3 FAMILY HISTORY OF MALIGNANT NEOPLASM OF BREAST: ICD-10-CM

## 2023-03-05 DIAGNOSIS — E11.610 TYPE 2 DIABETES MELLITUS WITH DIABETIC NEUROPATHIC ARTHROPATHY: ICD-10-CM

## 2023-03-05 DIAGNOSIS — Z87.81 PERSONAL HISTORY OF (HEALED) TRAUMATIC FRACTURE: ICD-10-CM

## 2023-03-05 DIAGNOSIS — E78.5 HYPERLIPIDEMIA, UNSPECIFIED: ICD-10-CM

## 2023-03-05 DIAGNOSIS — Z94.0 KIDNEY TRANSPLANT STATUS: ICD-10-CM

## 2023-03-05 DIAGNOSIS — Z80.8 FAMILY HISTORY OF MALIGNANT NEOPLASM OF OTHER ORGANS OR SYSTEMS: ICD-10-CM

## 2023-03-05 DIAGNOSIS — L84 CORNS AND CALLOSITIES: ICD-10-CM

## 2023-03-05 DIAGNOSIS — Z79.4 LONG TERM (CURRENT) USE OF INSULIN: ICD-10-CM

## 2023-03-05 DIAGNOSIS — Z79.899 OTHER LONG TERM (CURRENT) DRUG THERAPY: ICD-10-CM

## 2023-03-09 ENCOUNTER — OUTPATIENT (OUTPATIENT)
Dept: OUTPATIENT SERVICES | Facility: HOSPITAL | Age: 58
LOS: 1 days | Discharge: HOME | End: 2023-03-09
Payer: MEDICARE

## 2023-03-09 ENCOUNTER — APPOINTMENT (OUTPATIENT)
Dept: WOUND CARE | Facility: HOSPITAL | Age: 58
End: 2023-03-09
Payer: MEDICARE

## 2023-03-09 VITALS
TEMPERATURE: 73 F | OXYGEN SATURATION: 94 % | HEIGHT: 73 IN | BODY MASS INDEX: 41.75 KG/M2 | SYSTOLIC BLOOD PRESSURE: 179 MMHG | DIASTOLIC BLOOD PRESSURE: 77 MMHG | RESPIRATION RATE: 20 BRPM | HEART RATE: 73 BPM | WEIGHT: 315 LBS

## 2023-03-09 DIAGNOSIS — Z89.421 ACQUIRED ABSENCE OF OTHER RIGHT TOE(S): Chronic | ICD-10-CM

## 2023-03-09 DIAGNOSIS — M14.671 CHARCOT'S JOINT, RIGHT ANKLE AND FOOT: ICD-10-CM

## 2023-03-09 DIAGNOSIS — Z94.0 KIDNEY TRANSPLANT STATUS: Chronic | ICD-10-CM

## 2023-03-09 DIAGNOSIS — Z98.890 OTHER SPECIFIED POSTPROCEDURAL STATES: Chronic | ICD-10-CM

## 2023-03-09 DIAGNOSIS — N18.6 END STAGE RENAL DISEASE: Chronic | ICD-10-CM

## 2023-03-09 PROCEDURE — 99024 POSTOP FOLLOW-UP VISIT: CPT

## 2023-03-09 PROCEDURE — G0463: CPT

## 2023-03-14 NOTE — ASSESSMENT
[Verbal] : Verbal [Demo] : Demo [Patient] : Patient [Good - alert, interested, motivated] : Good - alert, interested, motivated [Verbalizes knowledge/Understanding] : Verbalizes knowledge/understanding [Dressing changes] : dressing changes [Foot Care] : foot care [Skin Care] : skin care [Signs and symptoms of infection] : sign and symptoms of infection [Surgery] : surgery [Nutrition] : nutrition [How and When to Call] : how and when to call [Labs and Tests] : labs and tests [Off-loading] : off-loading [Glycemic Control] : glycemic control [Patient responsibility to plan of care] : patient responsibility to plan of care [] : Yes [Stable] : stable [Home] : Home [Ambulatory] : Ambulatory [Not Applicable - Long Term Care/Home Health Agency] : Long Term Care/Home Health Agency: Not Applicable [FreeTextEntry2] : Infection prevention \par Wound care (dressing changes)\par Maintain optimal skin integrity to high pressure areas\par Compression therapy\par Nutrition and wound healing\par Offloading the stress on skin structures and decreasing potential pathologic biomechanical influences.\par Weight reduction strategies.  [FreeTextEntry3] : wound remains closed [FreeTextEntry4] : F/u in 1 week.

## 2023-03-14 NOTE — PHYSICAL EXAM
[0] : left 0 [1+] : left 1+ [Varicose Veins Of Lower Extremities] : not present [] : not present [Purpura] : no purpura  [Petechiae] : no petechiae [Skin Ulcer] : ulcer [Skin Induration] : no induration [Alert] : alert [Oriented to Person] : oriented to person [Oriented to Place] : oriented to place [Oriented to Time] : oriented to time [Calm] : calm [de-identified] : adult WM, NAD, alert, Ox 3. [de-identified] : HTN, HLD [de-identified] : Diabetic Charcot right foot deformity status post right Achilles tendon lengthening, right posterior tibial tendon lengthening, right peroneus brevis tendon detachment and reattachment with exostectomy of the right fifth metatarsal base and plantar cuboid, and right fifth digit proximal phalanx base resection [de-identified] : right foot plantar ulcer 5th metatarsal ulcer epithelialized. ulcer lateral head of 5th metatarsal epithelialized. [de-identified] : Diabetic neuropathy  [FreeTextEntry1] : Diabetic Charcot right foot deformity status post right Achilles tendon lengthening ; closed [de-identified] : no product

## 2023-03-14 NOTE — HISTORY OF PRESENT ILLNESS
[FreeTextEntry1] : Patient seen status post right Achilles tendon lengthening, right posterior tibial tendon lengthening, right peroneus brevis tendon detachment and reattachment with exostectomy of the right fifth metatarsal base and plantar cuboid, and right fifth digit proximal phalanx base resection (DOS: 1/18/2023).  Patient relates that he has been walking on the right foot with in comfortable sneakers since the last encounter.  Patient denies any fever, chills, nausea, vomiting, chest pain, calf pain, or any other complaints at this time.

## 2023-03-14 NOTE — REVIEW OF SYSTEMS
[Fever] : no fever [Chills] : no chills [Eye Pain] : no eye pain [Loss Of Hearing] : no hearing loss [Shortness Of Breath] : no shortness of breath [Abdominal Pain] : no abdominal pain [Vomiting] : no vomiting [Skin Lesions] : no skin lesions [Skin Wound] : no skin wound [Anxiety] : no anxiety [Easy Bleeding] : no tendency for easy bleeding [FreeTextEntry5] : HTN,HLD [FreeTextEntry7] : 40.9 BMI , morbid obesity  [FreeTextEntry8] : Kidney Transplant  [FreeTextEntry9] : Associated Right Diabetic Charcot foot status post right Achilles tendon lengthening, right posterior tibial tendon lengthening, right peroneus brevis tendon detachment and reattachment with exostectomy of the right fifth metatarsal base and plantar cuboid, and right fifth digit proximal phalanx base resection [de-identified] : plantar right foot midfoot plantar lateral and right forefoot lateral DFU 2  , the ulcer is clean and granular s/p surgical intervention, healing well [de-identified] : IDDM with neuropathy  [de-identified] : MARILUZ

## 2023-03-15 DIAGNOSIS — Z80.8 FAMILY HISTORY OF MALIGNANT NEOPLASM OF OTHER ORGANS OR SYSTEMS: ICD-10-CM

## 2023-03-15 DIAGNOSIS — Z86.16 PERSONAL HISTORY OF COVID-19: ICD-10-CM

## 2023-03-15 DIAGNOSIS — E11.51 TYPE 2 DIABETES MELLITUS WITH DIABETIC PERIPHERAL ANGIOPATHY WITHOUT GANGRENE: ICD-10-CM

## 2023-03-15 DIAGNOSIS — E11.621 TYPE 2 DIABETES MELLITUS WITH FOOT ULCER: ICD-10-CM

## 2023-03-15 DIAGNOSIS — Z79.4 LONG TERM (CURRENT) USE OF INSULIN: ICD-10-CM

## 2023-03-15 DIAGNOSIS — Z83.3 FAMILY HISTORY OF DIABETES MELLITUS: ICD-10-CM

## 2023-03-15 DIAGNOSIS — Z80.3 FAMILY HISTORY OF MALIGNANT NEOPLASM OF BREAST: ICD-10-CM

## 2023-03-15 DIAGNOSIS — E78.5 HYPERLIPIDEMIA, UNSPECIFIED: ICD-10-CM

## 2023-03-15 DIAGNOSIS — Z89.421 ACQUIRED ABSENCE OF OTHER RIGHT TOE(S): ICD-10-CM

## 2023-03-15 DIAGNOSIS — G47.33 OBSTRUCTIVE SLEEP APNEA (ADULT) (PEDIATRIC): ICD-10-CM

## 2023-03-15 DIAGNOSIS — L84 CORNS AND CALLOSITIES: ICD-10-CM

## 2023-03-15 DIAGNOSIS — L97.512 NON-PRESSURE CHRONIC ULCER OF OTHER PART OF RIGHT FOOT WITH FAT LAYER EXPOSED: ICD-10-CM

## 2023-03-15 DIAGNOSIS — Z86.73 PERSONAL HISTORY OF TRANSIENT ISCHEMIC ATTACK (TIA), AND CEREBRAL INFARCTION WITHOUT RESIDUAL DEFICITS: ICD-10-CM

## 2023-03-15 DIAGNOSIS — I10 ESSENTIAL (PRIMARY) HYPERTENSION: ICD-10-CM

## 2023-03-15 DIAGNOSIS — E11.610 TYPE 2 DIABETES MELLITUS WITH DIABETIC NEUROPATHIC ARTHROPATHY: ICD-10-CM

## 2023-03-15 DIAGNOSIS — Z85.828 PERSONAL HISTORY OF OTHER MALIGNANT NEOPLASM OF SKIN: ICD-10-CM

## 2023-03-15 DIAGNOSIS — Z86.718 PERSONAL HISTORY OF OTHER VENOUS THROMBOSIS AND EMBOLISM: ICD-10-CM

## 2023-03-15 DIAGNOSIS — Z94.0 KIDNEY TRANSPLANT STATUS: ICD-10-CM

## 2023-03-15 DIAGNOSIS — Z89.422 ACQUIRED ABSENCE OF OTHER LEFT TOE(S): ICD-10-CM

## 2023-03-15 DIAGNOSIS — L97.412 NON-PRESSURE CHRONIC ULCER OF RIGHT HEEL AND MIDFOOT WITH FAT LAYER EXPOSED: ICD-10-CM

## 2023-03-15 DIAGNOSIS — Z79.899 OTHER LONG TERM (CURRENT) DRUG THERAPY: ICD-10-CM

## 2023-03-15 DIAGNOSIS — Z87.81 PERSONAL HISTORY OF (HEALED) TRAUMATIC FRACTURE: ICD-10-CM

## 2023-03-16 ENCOUNTER — APPOINTMENT (OUTPATIENT)
Dept: WOUND CARE | Facility: HOSPITAL | Age: 58
End: 2023-03-16
Payer: MEDICARE

## 2023-03-16 ENCOUNTER — OUTPATIENT (OUTPATIENT)
Dept: OUTPATIENT SERVICES | Facility: HOSPITAL | Age: 58
LOS: 1 days | Discharge: HOME | End: 2023-03-16
Payer: MEDICARE

## 2023-03-16 VITALS
SYSTOLIC BLOOD PRESSURE: 184 MMHG | DIASTOLIC BLOOD PRESSURE: 84 MMHG | HEART RATE: 69 BPM | WEIGHT: 315 LBS | BODY MASS INDEX: 41.75 KG/M2 | TEMPERATURE: 98.4 F | OXYGEN SATURATION: 95 % | RESPIRATION RATE: 20 BRPM | HEIGHT: 73 IN

## 2023-03-16 DIAGNOSIS — Z98.890 OTHER SPECIFIED POSTPROCEDURAL STATES: Chronic | ICD-10-CM

## 2023-03-16 DIAGNOSIS — Z94.0 KIDNEY TRANSPLANT STATUS: Chronic | ICD-10-CM

## 2023-03-16 DIAGNOSIS — M14.671 CHARCOT'S JOINT, RIGHT ANKLE AND FOOT: ICD-10-CM

## 2023-03-16 DIAGNOSIS — N18.6 END STAGE RENAL DISEASE: Chronic | ICD-10-CM

## 2023-03-16 DIAGNOSIS — Z89.421 ACQUIRED ABSENCE OF OTHER RIGHT TOE(S): Chronic | ICD-10-CM

## 2023-03-16 PROCEDURE — 99024 POSTOP FOLLOW-UP VISIT: CPT

## 2023-03-16 PROCEDURE — G0463: CPT

## 2023-03-16 NOTE — REVIEW OF SYSTEMS
[Fever] : no fever [Chills] : no chills [Eye Pain] : no eye pain [Loss Of Hearing] : no hearing loss [Shortness Of Breath] : no shortness of breath [Abdominal Pain] : no abdominal pain [Vomiting] : no vomiting [Skin Lesions] : no skin lesions [Skin Wound] : no skin wound [Anxiety] : no anxiety [Easy Bleeding] : no tendency for easy bleeding [FreeTextEntry5] : HTN,HLD [FreeTextEntry8] : Kidney Transplant  [FreeTextEntry7] : 40.9 BMI , morbid obesity  [FreeTextEntry9] : Associated Right Diabetic Charcot foot status post right Achilles tendon lengthening, right posterior tibial tendon lengthening, right peroneus brevis tendon detachment and reattachment with exostectomy of the right fifth metatarsal base and plantar cuboid, and right fifth digit proximal phalanx base resection [de-identified] : plantar right foot midfoot plantar lateral and right forefoot lateral DFU 2  , the ulcer is clean and granular s/p surgical intervention, healing well [de-identified] : IDDM with neuropathy  [de-identified] : MARILUZ

## 2023-03-16 NOTE — ASSESSMENT
[Verbal] : Verbal [Demo] : Demo [Patient] : Patient [Spouse] : Spouse [Good - alert, interested, motivated] : Good - alert, interested, motivated [Verbalizes knowledge/Understanding] : Verbalizes knowledge/understanding [Foot Care] : foot care [Skin Care] : skin care [Pressure relief] : pressure relief [Signs and symptoms of infection] : sign and symptoms of infection [How and When to Call] : how and when to call [Off-loading] : off-loading [Compression Therapy] : compression therapy [Patient responsibility to plan of care] : patient responsibility to plan of care [] : Yes [Stable] : stable [Home] : Home [Ambulatory] : Ambulatory [FreeTextEntry2] : Maintain optimal skin integrity\par  [FreeTextEntry4] : Dr Catherine\brianda Pt has been seen by Orthotist & states he is in authorization process of getting orthotics- Dr Florin paredes\brianda Pt states he has f/u to his Cardiologist in a few weeks- Dr Florin paredes\brianda F/U to [facility] in 2 weeks

## 2023-03-16 NOTE — PLAN
[FreeTextEntry1] : Patient examined and evaluated at this time.\par Continue local wound care at this time. Pt to continue weight bearing to the right foot as tolerated at this time.\par Discussed the postoperative healing process and all questions answered to satisfaction and patient verbalized understanding.\par Spent 20 minutes for patient care and medical decision making.\par Patient to follow up in 2 weeks.\par

## 2023-03-19 DIAGNOSIS — Z80.8 FAMILY HISTORY OF MALIGNANT NEOPLASM OF OTHER ORGANS OR SYSTEMS: ICD-10-CM

## 2023-03-19 DIAGNOSIS — E11.51 TYPE 2 DIABETES MELLITUS WITH DIABETIC PERIPHERAL ANGIOPATHY WITHOUT GANGRENE: ICD-10-CM

## 2023-03-19 DIAGNOSIS — E78.5 HYPERLIPIDEMIA, UNSPECIFIED: ICD-10-CM

## 2023-03-19 DIAGNOSIS — Z80.3 FAMILY HISTORY OF MALIGNANT NEOPLASM OF BREAST: ICD-10-CM

## 2023-03-19 DIAGNOSIS — G47.33 OBSTRUCTIVE SLEEP APNEA (ADULT) (PEDIATRIC): ICD-10-CM

## 2023-03-19 DIAGNOSIS — L97.412 NON-PRESSURE CHRONIC ULCER OF RIGHT HEEL AND MIDFOOT WITH FAT LAYER EXPOSED: ICD-10-CM

## 2023-03-19 DIAGNOSIS — Z79.4 LONG TERM (CURRENT) USE OF INSULIN: ICD-10-CM

## 2023-03-19 DIAGNOSIS — Z89.422 ACQUIRED ABSENCE OF OTHER LEFT TOE(S): ICD-10-CM

## 2023-03-19 DIAGNOSIS — E11.621 TYPE 2 DIABETES MELLITUS WITH FOOT ULCER: ICD-10-CM

## 2023-03-19 DIAGNOSIS — Z79.82 LONG TERM (CURRENT) USE OF ASPIRIN: ICD-10-CM

## 2023-03-19 DIAGNOSIS — L84 CORNS AND CALLOSITIES: ICD-10-CM

## 2023-03-19 DIAGNOSIS — Z86.16 PERSONAL HISTORY OF COVID-19: ICD-10-CM

## 2023-03-19 DIAGNOSIS — Z87.81 PERSONAL HISTORY OF (HEALED) TRAUMATIC FRACTURE: ICD-10-CM

## 2023-03-19 DIAGNOSIS — Z79.899 OTHER LONG TERM (CURRENT) DRUG THERAPY: ICD-10-CM

## 2023-03-19 DIAGNOSIS — Z86.73 PERSONAL HISTORY OF TRANSIENT ISCHEMIC ATTACK (TIA), AND CEREBRAL INFARCTION WITHOUT RESIDUAL DEFICITS: ICD-10-CM

## 2023-03-19 DIAGNOSIS — E11.610 TYPE 2 DIABETES MELLITUS WITH DIABETIC NEUROPATHIC ARTHROPATHY: ICD-10-CM

## 2023-03-19 DIAGNOSIS — Z86.718 PERSONAL HISTORY OF OTHER VENOUS THROMBOSIS AND EMBOLISM: ICD-10-CM

## 2023-03-19 DIAGNOSIS — Z83.3 FAMILY HISTORY OF DIABETES MELLITUS: ICD-10-CM

## 2023-03-19 DIAGNOSIS — Z85.828 PERSONAL HISTORY OF OTHER MALIGNANT NEOPLASM OF SKIN: ICD-10-CM

## 2023-03-19 DIAGNOSIS — Z94.0 KIDNEY TRANSPLANT STATUS: ICD-10-CM

## 2023-03-19 NOTE — ED ADULT TRIAGE NOTE - TEMPERATURE IN CELSIUS (DEGREES C)
"[facility] Clinical Pharmacy Services: Vancomycin Pharmacokinetic Initial Consult Note    Darnell Geller is a 79 y.o. male who is on day 2 of pharmacy to dose vancomycin for Bacteremia and Sepsis.    Consult Information  Consulting Provider: Raz  Planned Duration of Therapy: 7 days  Was Patient Receiving Prior to Admission/Consult?: No  Loading Dose Ordered or Given: 1500 mg on 3/18 at 2100  PK/PD Target: Dose by Levels  Other Antimicrobials: Ceftriaxone    Imaging Reviewed?: No    Microbiology Data  MRSA PCR performed: No; Result: Not ordered due to excluded indication or presence of suspected abscess    Vitals/Labs  Ht: 182.9 cm (72\"); Wt: 75.6 kg (166 lb 10.7 oz)  Temp (24hrs), Av.1 °F (37.8 °C), Min:98.4 °F (36.9 °C), Max:102.1 °F (38.9 °C)   Estimated Creatinine Clearance: 22.4 mL/min (A) (by C-G formula based on SCr of 2.91 mg/dL (H)).       Results from last 7 days   Lab Units 23  0248 23  1642   VANCOMYCIN RM mcg/mL 19.80  --    CREATININE mg/dL 2.91* 3.36*   WBC 10*3/mm3 15.78* 13.61*     Assessment/Plan:  Vancomycin level reported as 19.8 this AM  Crcl~22ml/min.  Will provide vancomycin 750 mg once one now and order follow up vancomycin level and BMP tomorrow AM.    Pharmacy will follow patient's kidney function and will adjust doses and obtain levels as necessary. Thank you for involving pharmacy in this patient's care. Please contact pharmacy with any questions or concerns.                           Estiven James  Clinical Pharmacist  " 36.7

## 2023-03-20 RX ORDER — TACROLIMUS 0.5 MG/1
0.5 CAPSULE ORAL
Qty: 90 | Refills: 3 | Status: DISCONTINUED | COMMUNITY
Start: 2017-06-19 | End: 2023-03-20

## 2023-03-28 ENCOUNTER — NON-APPOINTMENT (OUTPATIENT)
Age: 58
End: 2023-03-28

## 2023-03-28 ENCOUNTER — APPOINTMENT (OUTPATIENT)
Dept: CARDIOLOGY | Facility: CLINIC | Age: 58
End: 2023-03-28
Payer: MEDICARE

## 2023-03-28 VITALS
HEART RATE: 68 BPM | SYSTOLIC BLOOD PRESSURE: 198 MMHG | OXYGEN SATURATION: 96 % | BODY MASS INDEX: 41.75 KG/M2 | DIASTOLIC BLOOD PRESSURE: 74 MMHG | WEIGHT: 315 LBS | HEIGHT: 73 IN

## 2023-03-28 VITALS — SYSTOLIC BLOOD PRESSURE: 160 MMHG | DIASTOLIC BLOOD PRESSURE: 86 MMHG

## 2023-03-28 PROCEDURE — 99215 OFFICE O/P EST HI 40 MIN: CPT

## 2023-03-28 PROCEDURE — 93000 ELECTROCARDIOGRAM COMPLETE: CPT

## 2023-03-28 RX ORDER — CEPHALEXIN 500 MG/1
500 TABLET ORAL EVERY 6 HOURS
Qty: 20 | Refills: 0 | Status: DISCONTINUED | COMMUNITY
Start: 2022-11-23 | End: 2023-03-28

## 2023-04-05 ENCOUNTER — APPOINTMENT (OUTPATIENT)
Dept: WOUND CARE | Facility: HOSPITAL | Age: 58
End: 2023-04-05
Payer: MEDICARE

## 2023-04-05 ENCOUNTER — OUTPATIENT (OUTPATIENT)
Dept: OUTPATIENT SERVICES | Facility: HOSPITAL | Age: 58
LOS: 1 days | Discharge: HOME | End: 2023-04-05
Payer: MEDICARE

## 2023-04-05 VITALS
WEIGHT: 315 LBS | SYSTOLIC BLOOD PRESSURE: 156 MMHG | HEIGHT: 73 IN | HEART RATE: 62 BPM | BODY MASS INDEX: 41.75 KG/M2 | TEMPERATURE: 99.2 F | OXYGEN SATURATION: 94 % | RESPIRATION RATE: 20 BRPM | DIASTOLIC BLOOD PRESSURE: 88 MMHG

## 2023-04-05 DIAGNOSIS — E11.621 TYPE 2 DIABETES MELLITUS WITH FOOT ULCER: ICD-10-CM

## 2023-04-05 DIAGNOSIS — L81.4 OTHER MELANIN HYPERPIGMENTATION: ICD-10-CM

## 2023-04-05 DIAGNOSIS — Z98.890 OTHER SPECIFIED POSTPROCEDURAL STATES: Chronic | ICD-10-CM

## 2023-04-05 DIAGNOSIS — Z94.0 KIDNEY TRANSPLANT STATUS: Chronic | ICD-10-CM

## 2023-04-05 DIAGNOSIS — Z89.421 ACQUIRED ABSENCE OF OTHER RIGHT TOE(S): Chronic | ICD-10-CM

## 2023-04-05 DIAGNOSIS — N18.6 END STAGE RENAL DISEASE: Chronic | ICD-10-CM

## 2023-04-05 DIAGNOSIS — Z98.890 OTHER SPECIFIED POSTPROCEDURAL STATES: ICD-10-CM

## 2023-04-05 PROCEDURE — 99024 POSTOP FOLLOW-UP VISIT: CPT

## 2023-04-05 PROCEDURE — G0463: CPT

## 2023-04-06 DIAGNOSIS — Z80.8 FAMILY HISTORY OF MALIGNANT NEOPLASM OF OTHER ORGANS OR SYSTEMS: ICD-10-CM

## 2023-04-06 DIAGNOSIS — Z87.81 PERSONAL HISTORY OF (HEALED) TRAUMATIC FRACTURE: ICD-10-CM

## 2023-04-06 DIAGNOSIS — Z89.421 ACQUIRED ABSENCE OF OTHER RIGHT TOE(S): ICD-10-CM

## 2023-04-06 DIAGNOSIS — E11.51 TYPE 2 DIABETES MELLITUS WITH DIABETIC PERIPHERAL ANGIOPATHY WITHOUT GANGRENE: ICD-10-CM

## 2023-04-06 DIAGNOSIS — L97.412 NON-PRESSURE CHRONIC ULCER OF RIGHT HEEL AND MIDFOOT WITH FAT LAYER EXPOSED: ICD-10-CM

## 2023-04-06 DIAGNOSIS — Z83.3 FAMILY HISTORY OF DIABETES MELLITUS: ICD-10-CM

## 2023-04-06 DIAGNOSIS — E78.5 HYPERLIPIDEMIA, UNSPECIFIED: ICD-10-CM

## 2023-04-06 DIAGNOSIS — Z94.0 KIDNEY TRANSPLANT STATUS: ICD-10-CM

## 2023-04-06 DIAGNOSIS — Z86.73 PERSONAL HISTORY OF TRANSIENT ISCHEMIC ATTACK (TIA), AND CEREBRAL INFARCTION WITHOUT RESIDUAL DEFICITS: ICD-10-CM

## 2023-04-06 DIAGNOSIS — Z80.3 FAMILY HISTORY OF MALIGNANT NEOPLASM OF BREAST: ICD-10-CM

## 2023-04-06 DIAGNOSIS — E11.610 TYPE 2 DIABETES MELLITUS WITH DIABETIC NEUROPATHIC ARTHROPATHY: ICD-10-CM

## 2023-04-06 DIAGNOSIS — Z79.899 OTHER LONG TERM (CURRENT) DRUG THERAPY: ICD-10-CM

## 2023-04-06 DIAGNOSIS — Z79.4 LONG TERM (CURRENT) USE OF INSULIN: ICD-10-CM

## 2023-04-06 DIAGNOSIS — Z86.16 PERSONAL HISTORY OF COVID-19: ICD-10-CM

## 2023-04-06 DIAGNOSIS — Z79.82 LONG TERM (CURRENT) USE OF ASPIRIN: ICD-10-CM

## 2023-04-06 DIAGNOSIS — G47.33 OBSTRUCTIVE SLEEP APNEA (ADULT) (PEDIATRIC): ICD-10-CM

## 2023-04-06 DIAGNOSIS — L84 CORNS AND CALLOSITIES: ICD-10-CM

## 2023-04-06 DIAGNOSIS — Z86.718 PERSONAL HISTORY OF OTHER VENOUS THROMBOSIS AND EMBOLISM: ICD-10-CM

## 2023-04-06 DIAGNOSIS — E11.621 TYPE 2 DIABETES MELLITUS WITH FOOT ULCER: ICD-10-CM

## 2023-04-06 DIAGNOSIS — Z85.828 PERSONAL HISTORY OF OTHER MALIGNANT NEOPLASM OF SKIN: ICD-10-CM

## 2023-04-06 NOTE — ASSESSMENT
[Verbal] : Verbal [Demo] : Demo [Patient] : Patient [Spouse] : Spouse [Good - alert, interested, motivated] : Good - alert, interested, motivated [Verbalizes knowledge/Understanding] : Verbalizes knowledge/understanding [Foot Care] : foot care [Skin Care] : skin care [Pressure relief] : pressure relief [Signs and symptoms of infection] : sign and symptoms of infection [How and When to Call] : how and when to call [Off-loading] : off-loading [Compression Therapy] : compression therapy [Patient responsibility to plan of care] : patient responsibility to plan of care [] : Yes [Stable] : stable [Home] : Home [Ambulatory] : Ambulatory [FreeTextEntry2] : Infection prevention \par Wound care (dressing changes)\par Maintain optimal skin integrity to high pressure areas\par Compression therapy\par Nutrition and wound healing\par Elevation and low sodium compliance.\par Pressure relief/ Pressure redistribution\par Offloading the stress on skin structures and decreasing potential pathologic biomechanical influences.\par Frequent turning and repositioning q 2 hrs.\par PT will maintain BP within individually acceptable range.\par Weight reduction strategies.  [FreeTextEntry3] : wounds remain closed [FreeTextEntry4] : PT provided with physical therapy Rx\par Pt provided with contact information for Dr Becki [facility]\par F/u in 2 weeks at wound clinic

## 2023-04-06 NOTE — PLAN
[FreeTextEntry1] : Patient examined and evaluated at this time. Pt to continue weight bearing to the right foot as tolerated at this time. Referral for physical therapy given to the patient - for stretching, strengthening , stabilization and ROM exercises. Also ordered US for BLE to r/o DVT since patient has noticed increased swelling to the lower extremity with R>L. Patient is also scheduled to follow up with nephrologist \par Discussed the postoperative healing process and all questions answered to satisfaction and patient verbalized understanding.\par Spent 20 minutes for patient care and medical decision making.\par Patient to follow up in 2 weeks.\par

## 2023-04-06 NOTE — HISTORY OF PRESENT ILLNESS
[FreeTextEntry1] : Patient is 57 year old male presenting for follow up s/p right Achilles tendon lengthening, right posterior tibial tendon lengthening, right peroneus brevis tendon detachment and reattachment with exostectomy of the right fifth metatarsal base and plantar cuboid, and right fifth digit proximal phalanx base resection (DOS: 1/18/2023). Patient relates that he has been walking on the right foot with in comfortable sneakers since the last encounter. Patient denies any fever, chills, nausea, vomiting, chest pain, calf pain, or any other complaints at this time. \par Patient relates swelling to bilateral lower extremity with R>L and was advised to obtain  US to rule out DVT since patient has history of DVT. Patient denies any calf pain at this time.

## 2023-04-06 NOTE — PHYSICAL EXAM
[0] : left 0 [1+] : left 1+ [Skin Ulcer] : ulcer [Alert] : alert [Oriented to Person] : oriented to person [Oriented to Place] : oriented to place [Calm] : calm [Oriented to Time] : oriented to time [Varicose Veins Of Lower Extremities] : not present [] : not present [Purpura] : no purpura  [Petechiae] : no petechiae [Skin Induration] : no induration [de-identified] : adult WM, NAD, alert, Ox 3. [de-identified] : HTN, HLD [de-identified] : Diabetic Charcot right foot deformity status post right Achilles tendon lengthening, right posterior tibial tendon lengthening, right peroneus brevis tendon detachment and reattachment with exostectomy of the right fifth metatarsal base and plantar cuboid, and right fifth digit proximal phalanx base resection [de-identified] : right foot plantar ulcer 5th metatarsal ulcer epithelialized. Ulcer lateral head of 5th metatarsal epithelialized. [de-identified] : Diabetic neuropathy  [FreeTextEntry1] : Right Lateral Foot 5th Met- Closed [de-identified] : No Dressing [FreeTextEntry7] : Right Plantar Foot- Closed [de-identified] : No Dressing [de-identified] : Right Medial Foot- Closed [de-identified] : No Dressing [de-identified] : Ace wraps [de-identified] : Ace wraps [de-identified] : Ace wraps

## 2023-04-06 NOTE — REVIEW OF SYSTEMS
[Fever] : no fever [Chills] : no chills [Eye Pain] : no eye pain [Loss Of Hearing] : no hearing loss [Shortness Of Breath] : no shortness of breath [Abdominal Pain] : no abdominal pain [Vomiting] : no vomiting [Skin Lesions] : no skin lesions [Skin Wound] : no skin wound [Anxiety] : no anxiety [Easy Bleeding] : no tendency for easy bleeding [FreeTextEntry5] : HTN,HLD [FreeTextEntry7] : 40.9 BMI , morbid obesity  [FreeTextEntry8] : Kidney Transplant  [FreeTextEntry9] : Associated Right Diabetic Charcot foot status post right Achilles tendon lengthening, right posterior tibial tendon lengthening, right peroneus brevis tendon detachment and reattachment with exostectomy of the right fifth metatarsal base and plantar cuboid, and right fifth digit proximal phalanx base resection [de-identified] : plantar right foot midfoot plantar lateral and right forefoot lateral DFU 2  , the ulcer is clean and granular s/p surgical intervention, healed with no signs of infection  [de-identified] : IDDM with neuropathy  [de-identified] : MARILUZ

## 2023-04-07 ENCOUNTER — OUTPATIENT (OUTPATIENT)
Dept: OUTPATIENT SERVICES | Facility: HOSPITAL | Age: 58
LOS: 1 days | End: 2023-04-07
Payer: MEDICARE

## 2023-04-07 DIAGNOSIS — N18.6 END STAGE RENAL DISEASE: Chronic | ICD-10-CM

## 2023-04-07 DIAGNOSIS — Z89.421 ACQUIRED ABSENCE OF OTHER RIGHT TOE(S): Chronic | ICD-10-CM

## 2023-04-07 DIAGNOSIS — Z94.0 KIDNEY TRANSPLANT STATUS: Chronic | ICD-10-CM

## 2023-04-07 DIAGNOSIS — Z98.890 OTHER SPECIFIED POSTPROCEDURAL STATES: Chronic | ICD-10-CM

## 2023-04-07 DIAGNOSIS — I82.403 ACUTE EMBOLISM AND THROMBOSIS OF UNSPECIFIED DEEP VEINS OF LOWER EXTREMITY, BILATERAL: ICD-10-CM

## 2023-04-07 PROCEDURE — 93970 EXTREMITY STUDY: CPT

## 2023-04-07 PROCEDURE — 93970 EXTREMITY STUDY: CPT | Mod: 26

## 2023-04-20 ENCOUNTER — OUTPATIENT (OUTPATIENT)
Dept: OUTPATIENT SERVICES | Facility: HOSPITAL | Age: 58
LOS: 1 days | Discharge: HOME | End: 2023-04-20
Payer: MEDICARE

## 2023-04-20 ENCOUNTER — APPOINTMENT (OUTPATIENT)
Dept: WOUND CARE | Facility: HOSPITAL | Age: 58
End: 2023-04-20
Payer: MEDICARE

## 2023-04-20 DIAGNOSIS — E11.621 TYPE 2 DIABETES MELLITUS WITH FOOT ULCER: ICD-10-CM

## 2023-04-20 DIAGNOSIS — Z89.421 ACQUIRED ABSENCE OF OTHER RIGHT TOE(S): Chronic | ICD-10-CM

## 2023-04-20 DIAGNOSIS — L97.512 NON-PRESSURE CHRONIC ULCER OF OTHER PART OF RIGHT FOOT WITH FAT LAYER EXPOSED: ICD-10-CM

## 2023-04-20 DIAGNOSIS — Z94.0 KIDNEY TRANSPLANT STATUS: Chronic | ICD-10-CM

## 2023-04-20 DIAGNOSIS — Z98.890 OTHER SPECIFIED POSTPROCEDURAL STATES: Chronic | ICD-10-CM

## 2023-04-20 DIAGNOSIS — N18.6 END STAGE RENAL DISEASE: Chronic | ICD-10-CM

## 2023-04-20 PROCEDURE — 99213 OFFICE O/P EST LOW 20 MIN: CPT

## 2023-04-20 PROCEDURE — G0463: CPT

## 2023-04-20 NOTE — ED ADULT TRIAGE NOTE - TEMPERATURE IN FAHRENHEIT (DEGREES F)
74M w/ PMHx of DM, HTN, HLD, depression, BPH, CAD s/p PCI x2 (to Cx in 2019), COVID-19 two weeks ago, presented for palpitations, lightheadedness w/o LOC, and SOB that began yesterday 4/7. Patient states his symptoms felt similar to his prior hospitalization when he received PCI in 2019, but this episode was worse. Patient was given an event monitor for palpitations last week and planned for echo on Monday in office. Per wife, patient has been sleeping more than usual this week. Today, his symptoms worsened and prompted him to present to ED.     No known prior hx of Afib or heart failure. Not on anticoagulation outpatient.     On arrival to ED, HRs 170s, concern for VT, lightheaded, felt like he was going to pass out. He was shocked twice, and was given Lidocaine bolus and Amio bolus, then started on Lidocaine and Amio gtts. Some of the EKGs with concern for Afib with aberrancy. Taken to cath lab for LHC and IABP (Right femoral site). Now s/p LHC with x1 TOM to RCA and x1 TOM to PDA.   (08 Apr 2023 14:20)    ==========    INTERVAL HISTORY: Pt intubated on 4/10 to decrease sympathetic drive and suppress VT. Pt taken down for ablation, but found to have questionable melena, so procedure was aborted prior to initiation, and pt was transported back to CICU. GI workup for acute bleed was negative except for ulcerations around the NGT tip in the stomach. Pt was weaned off sedation and extubated on 4/15 with significant respiratory secretions. Pt tolerating chest PT, suction, duonebs, and incentive bette to break up secretions. ICU stay has been complicated by MSSA ? tracheo bronchitis  bronchoscopy cultures and sputum cultures + for MSSA. Treated with Vancomycin and Aztreonam x7 day course (4/10-4/17). 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     Imaging studies revealed that pt with CVA    Pt now ongoing further workup for this and for continued arrhythmias      A/P   #FEVER  tmax 37.5  completed ab course  reculture if spikes  unclear what findings on chest CT are  no positive blood culture   check sputum culture       #elevated LFTs  ? shock liver  ?from amiodarone, less likely  continued improvement      #? MIS-A  remains on covid doses of dexamethasone- day # 9/10  repeat ferritin, CRP , d-dimer once finish therapy    #CVA  s/p  MRI of head  swallow evaluation- appreciated  repeat echo- noted       Giuliana Cunha M.D. ,   please reach via teams   If no answer, or after 5PM/ weekends,  then please call  [phone]    Assessment and plan discussed with the primary team .    I     98

## 2023-04-24 ENCOUNTER — APPOINTMENT (OUTPATIENT)
Dept: PODIATRY | Facility: CLINIC | Age: 58
End: 2023-04-24
Payer: MEDICARE

## 2023-04-24 VITALS
BODY MASS INDEX: 41.75 KG/M2 | DIASTOLIC BLOOD PRESSURE: 75 MMHG | HEIGHT: 73 IN | SYSTOLIC BLOOD PRESSURE: 183 MMHG | WEIGHT: 315 LBS | OXYGEN SATURATION: 95 % | TEMPERATURE: 97.4 F | HEART RATE: 73 BPM

## 2023-04-24 PROBLEM — L97.512 CHRONIC ULCER OF GREAT TOE OF RIGHT FOOT WITH FAT LAYER EXPOSED: Status: ACTIVE | Noted: 2020-08-26

## 2023-04-24 PROCEDURE — 11720 DEBRIDE NAIL 1-5: CPT

## 2023-04-24 PROCEDURE — 99213 OFFICE O/P EST LOW 20 MIN: CPT | Mod: 25

## 2023-04-24 NOTE — REASON FOR VISIT
[Initial Visit] : an initial visit for [Other:___] : [unfilled] [FreeTextEntry2] : Patient is being seen for B/L nail care

## 2023-04-25 DIAGNOSIS — E78.5 HYPERLIPIDEMIA, UNSPECIFIED: ICD-10-CM

## 2023-04-25 DIAGNOSIS — Z86.73 PERSONAL HISTORY OF TRANSIENT ISCHEMIC ATTACK (TIA), AND CEREBRAL INFARCTION WITHOUT RESIDUAL DEFICITS: ICD-10-CM

## 2023-04-25 DIAGNOSIS — Z86.718 PERSONAL HISTORY OF OTHER VENOUS THROMBOSIS AND EMBOLISM: ICD-10-CM

## 2023-04-25 DIAGNOSIS — L97.412 NON-PRESSURE CHRONIC ULCER OF RIGHT HEEL AND MIDFOOT WITH FAT LAYER EXPOSED: ICD-10-CM

## 2023-04-25 DIAGNOSIS — Z89.421 ACQUIRED ABSENCE OF OTHER RIGHT TOE(S): ICD-10-CM

## 2023-04-25 DIAGNOSIS — Z86.16 PERSONAL HISTORY OF COVID-19: ICD-10-CM

## 2023-04-25 DIAGNOSIS — Z87.81 PERSONAL HISTORY OF (HEALED) TRAUMATIC FRACTURE: ICD-10-CM

## 2023-04-25 DIAGNOSIS — Z80.8 FAMILY HISTORY OF MALIGNANT NEOPLASM OF OTHER ORGANS OR SYSTEMS: ICD-10-CM

## 2023-04-25 DIAGNOSIS — Z89.422 ACQUIRED ABSENCE OF OTHER LEFT TOE(S): ICD-10-CM

## 2023-04-25 DIAGNOSIS — I10 ESSENTIAL (PRIMARY) HYPERTENSION: ICD-10-CM

## 2023-04-25 DIAGNOSIS — E11.621 TYPE 2 DIABETES MELLITUS WITH FOOT ULCER: ICD-10-CM

## 2023-04-25 DIAGNOSIS — G47.33 OBSTRUCTIVE SLEEP APNEA (ADULT) (PEDIATRIC): ICD-10-CM

## 2023-04-25 DIAGNOSIS — L97.512 NON-PRESSURE CHRONIC ULCER OF OTHER PART OF RIGHT FOOT WITH FAT LAYER EXPOSED: ICD-10-CM

## 2023-04-25 DIAGNOSIS — Z79.4 LONG TERM (CURRENT) USE OF INSULIN: ICD-10-CM

## 2023-04-25 DIAGNOSIS — E11.51 TYPE 2 DIABETES MELLITUS WITH DIABETIC PERIPHERAL ANGIOPATHY WITHOUT GANGRENE: ICD-10-CM

## 2023-04-25 DIAGNOSIS — Z80.3 FAMILY HISTORY OF MALIGNANT NEOPLASM OF BREAST: ICD-10-CM

## 2023-04-25 DIAGNOSIS — L84 CORNS AND CALLOSITIES: ICD-10-CM

## 2023-04-25 DIAGNOSIS — Z94.0 KIDNEY TRANSPLANT STATUS: ICD-10-CM

## 2023-04-25 DIAGNOSIS — E11.610 TYPE 2 DIABETES MELLITUS WITH DIABETIC NEUROPATHIC ARTHROPATHY: ICD-10-CM

## 2023-04-25 DIAGNOSIS — Z85.828 PERSONAL HISTORY OF OTHER MALIGNANT NEOPLASM OF SKIN: ICD-10-CM

## 2023-04-25 DIAGNOSIS — Z83.3 FAMILY HISTORY OF DIABETES MELLITUS: ICD-10-CM

## 2023-04-25 DIAGNOSIS — Z79.899 OTHER LONG TERM (CURRENT) DRUG THERAPY: ICD-10-CM

## 2023-05-04 NOTE — PHYSICAL EXAM
[0] : left 0 [1+] : left 1+ [Skin Ulcer] : ulcer [Alert] : alert [Oriented to Person] : oriented to person [Oriented to Place] : oriented to place [Oriented to Time] : oriented to time [Calm] : calm [Varicose Veins Of Lower Extremities] : not present [] : not present [Purpura] : no purpura  [Petechiae] : no petechiae [Skin Induration] : no induration [de-identified] : adult WM, NAD, alert, Ox 3. [de-identified] : HTN, HLD [de-identified] : Diabetic Charcot right foot deformity status post right Achilles tendon lengthening, right posterior tibial tendon lengthening, right peroneus brevis tendon detachment and reattachment with exostectomy of the right fifth metatarsal base and plantar cuboid, and right fifth digit proximal phalanx base resection [de-identified] : right foot plantar ulcer 5th metatarsal ulcer epithelialized. Ulcer lateral head of 5th metatarsal epithelialized. [de-identified] : Diabetic neuropathy  [FreeTextEntry1] : Right Foot ; closed [de-identified] : none [de-identified] : none [de-identified] : no product [TWNoteComboBox6] : Surgical [TWNoteComboBox5] : No [de-identified] : No [de-identified] : Normal [de-identified] : None

## 2023-05-04 NOTE — ASSESSMENT
[Verbal] : Verbal [Demo] : Demo [Patient] : Patient [Good - alert, interested, motivated] : Good - alert, interested, motivated [Demonstrates independently] : demonstrates independently [Foot Care] : foot care [Skin Care] : skin care [Signs and symptoms of infection] : sign and symptoms of infection [Nutrition] : nutrition [How and When to Call] : how and when to call [Off-loading] : off-loading [Compression Therapy] : compression therapy [Patient responsibility to plan of care] : patient responsibility to plan of care [] : Yes [Stable] : stable [Home] : Home [Ambulatory] : Ambulatory [FreeTextEntry2] : Infection Prevention\par Foot & nail care\par Physical therapy [FreeTextEntry4] : F/u 3 weeks

## 2023-05-04 NOTE — PLAN
[FreeTextEntry1] : Patient examined and evaluated at this time. Pt to continue weight bearing to the right foot as tolerated at this time.  Patient to continue physical therapy at this time.  Patient happy with outcome of treatment.  Patient will benefit from diabetic shoes with multidensity inserts.\par Spent 20 minutes for patient care and medical decision making.\par Patient to follow up in 2 to 3 weeks.\par

## 2023-05-04 NOTE — REVIEW OF SYSTEMS
[Fever] : no fever [Chills] : no chills [Eye Pain] : no eye pain [Loss Of Hearing] : no hearing loss [Shortness Of Breath] : no shortness of breath [Abdominal Pain] : no abdominal pain [Vomiting] : no vomiting [Skin Lesions] : no skin lesions [Skin Wound] : no skin wound [Anxiety] : no anxiety [Easy Bleeding] : no tendency for easy bleeding [FreeTextEntry5] : HTN,HLD [FreeTextEntry7] : 40.9 BMI , morbid obesity  [FreeTextEntry8] : Kidney Transplant  [de-identified] : plantar right foot midfoot plantar lateral and right forefoot lateral DFU 2, all the ulcers have healed. [FreeTextEntry9] : Associated Right Diabetic Charcot foot status post right Achilles tendon lengthening, right posterior tibial tendon lengthening, right peroneus brevis tendon detachment and reattachment with exostectomy of the right fifth metatarsal base and plantar cuboid, and right fifth digit proximal phalanx base resection [de-identified] : IDDM with neuropathy  [de-identified] : MARILUZ

## 2023-05-04 NOTE — HISTORY OF PRESENT ILLNESS
[FreeTextEntry1] : Patient is 57 year old male presenting for follow up s/p right Achilles tendon lengthening, right posterior tibial tendon lengthening, right peroneus brevis tendon detachment and reattachment with exostectomy of the right fifth metatarsal base and plantar cuboid, and right fifth digit proximal phalanx base resection (DOS: 1/18/2023). Patient relates that he has been walking on the right foot with in comfortable sneakers since the last encounter. Patient denies any fever, chills, nausea, vomiting, chest pain, calf pain, or any other complaints at this time.  Patient relates that he has started physical therapy at this time.

## 2023-05-17 NOTE — ED PROVIDER NOTE - NEURO NEGATIVE STATEMENT, MLM
Date of Service: 05-17-23 @ 07:58           CARDIOLOGY     PROGRESS  NOTE   ________________________________________________    CHIEF COMPLAINT:Patient is a 89y old  Male who presents with a chief complaint of anemia (16 May 2023 12:23)  no complain  	  REVIEW OF SYSTEMS:  CONSTITUTIONAL: No fever, weight loss, or fatigue  EYES: No eye pain, visual disturbances, or discharge  ENT:  No difficulty hearing, tinnitus, vertigo; No sinus or throat pain  NECK: No pain or stiffness  RESPIRATORY: No cough, wheezing, chills or hemoptysis; No Shortness of Breath  CARDIOVASCULAR: No chest pain, palpitations, passing out, dizziness, or leg swelling  GASTROINTESTINAL: No abdominal or epigastric pain. No nausea, vomiting, or hematemesis; No diarrhea or constipation. No melena or hematochezia.  GENITOURINARY: No dysuria, frequency, hematuria, or incontinence  NEUROLOGICAL: No headaches, memory loss, loss of strength, numbness, or tremors  SKIN: No itching, burning, rashes, or lesions   LYMPH Nodes: No enlarged glands  ENDOCRINE: No heat or cold intolerance; No hair loss  MUSCULOSKELETAL: No joint pain or swelling; No muscle, back, or extremity pain  PSYCHIATRIC: No depression, anxiety, mood swings, or difficulty sleeping  HEME/LYMPH: No easy bruising, or bleeding gums  ALLERGY AND IMMUNOLOGIC: No hives or eczema	    [X ] All others negative	  [ ] Unable to obtain    PHYSICAL EXAM:  T(C): 36.6 (05-17-23 @ 04:41), Max: 36.7 (05-16-23 @ 13:58)  HR: 76 (05-17-23 @ 04:41) (71 - 97)  BP: 110/52 (05-17-23 @ 04:41) (98/50 - 118/70)  RR: 17 (05-17-23 @ 04:41) (16 - 17)  SpO2: 99% (05-17-23 @ 04:41) (96% - 100%)  Wt(kg): --  I&O's Summary    16 May 2023 07:01  -  17 May 2023 07:00  --------------------------------------------------------  IN: 600 mL / OUT: 0 mL / NET: 600 mL        Appearance: Normal	  HEENT:   Normal oral mucosa, PERRL, EOMI	  Lymphatic: No lymphadenopathy  Cardiovascular: Normal S1 S2, No JVD, + murmurs, No edema  Respiratory: Lungs clear to auscultation	  Psychiatry: A & O x 3, Mood & affect appropriate  Gastrointestinal:  Soft, Non-tender, + BS	  Skin: No rashes, No ecchymoses, No cyanosis	  Neurologic: Non-focal  Extremities: Normal range of motion, No clubbing, cyanosis or edema  Vascular: Peripheral pulses palpable 2+ bilaterally    MEDICATIONS  (STANDING):  Biotene Dry Mouth Oral Rinse 15 milliLiter(s) Swish and Spit every 8 hours  dextrose 5%. 1000 milliLiter(s) (100 mL/Hr) IV Continuous <Continuous>  dextrose 5%. 1000 milliLiter(s) (50 mL/Hr) IV Continuous <Continuous>  dextrose 50% Injectable 25 Gram(s) IV Push once  dextrose 50% Injectable 12.5 Gram(s) IV Push once  entecavir 0.5 milliGRAM(s) Oral every 48 hours  febuxostat 40 milliGRAM(s) Oral <User Schedule>  finasteride 5 milliGRAM(s) Oral daily  folic acid 1 milliGRAM(s) Oral daily  glucagon  Injectable 1 milliGRAM(s) IntraMuscular once  glucagon  Injectable 1 milliGRAM(s) IntraMuscular once  guaiFENesin Oral Liquid (Sugar-Free) 200 milliGRAM(s) Oral every 6 hours  insulin lispro (ADMELOG) corrective regimen sliding scale   SubCutaneous three times a day before meals  insulin lispro (ADMELOG) corrective regimen sliding scale   SubCutaneous at bedtime  insulin lispro Injectable (ADMELOG) 11 Unit(s) SubCutaneous three times a day before meals  insulin NPH human recombinant 12 Unit(s) SubCutaneous before breakfast  levothyroxine 25 MICROGram(s) Oral daily  methylPREDNISolone sodium succinate Injectable 80 milliGRAM(s) IV Push daily  metoprolol tartrate 25 milliGRAM(s) Oral two times a day  pantoprazole    Tablet 40 milliGRAM(s) Oral two times a day  tamsulosin 0.4 milliGRAM(s) Oral at bedtime  torsemide 10 milliGRAM(s) Oral daily  trimethoprim  160 mG/sulfamethoxazole 800 mG 1 Tablet(s) Oral daily      TELEMETRY: 	    ECG:  	  RADIOLOGY:  OTHER: 	  	  LABS:	 	    CARDIAC MARKERS:                                6.8    9.99  )-----------( 217      ( 17 May 2023 07:06 )             20.3     05-16    145  |  107  |  47<H>  ----------------------------<  103<H>  4.0   |  23  |  1.79<H>    Ca    8.0<L>      16 May 2023 07:20  Phos  3.8     05-16  Mg     2.1     05-16      proBNP:   Lipid Profile: Cholesterol 106  LDL --  HDL 56      HgA1c:   TSH: Thyroid Stimulating Hormone, Serum: 2.83 uIU/mL (05-03 @ 07:08)          Assessment and plan  ---------------------------  89yo M w/ PMHx of DM2, HTN, CHF, pAfib/flutter (on Eliquis), SSS s/p PPM, Hx of anemia and GAVE syndrome, presents with anemia, pt reports that over the last few days he has had fatigue, dyspnea on exertion, denies chest pain, abdominal pain, hematuria, melena, hematochezia, of note pt recently admitted 4/4-4/8 at [facility] for anemia requiring multiple transfusions and had extensive GI workup and anemia workup w/ findings suspicious for GAVE syndrome, pt went to his PCP today and had routine blood work showing his Hg 7.2 and was instructed to come to the ED for further management, in the ED, pt was tachycardic but otherwise VSS, labs notable for Hg 6.6 (baseline btwn 8-9) and mildly elevated Cr, pt ordered for 2U PRBC which are pending, admitted to general medicine for further management.   pt with hx of chronic a.fib with Multiple episodes of anemia requiring transfusion  gi sheehan noted but pt has  sig blood loss is it possible to be sec to gastritis will discuss with GI  pt still needs DAPT after watchman procedure or DOAC  for 45 days and DAPT till 6 months or DAPT for total of 6 months and asa daily after that indefinitely  need to make sure pt will be able to tolerated this tx post watchman procedure specially for the first 45 days  s/p blood transfusion  re start on AC as clear by onc  onc noted consider Rituximab  transfuse keep hgb>7 , pt with CAD  pt is been off AC concern about cva in view of a.fib may re start  if no contraindication hematological wise hematology follow up  hgb decreased to 6.7 will fu  continue diuresis. increase  metoprolol to bid, fu hr  hgb overall stable, last dose of chemo on Wednesday  may start AC will discuss with hematology    	         no loss of consciousness, no gait abnormality, no headache, no sensory deficits, and no weakness.

## 2023-06-13 ENCOUNTER — APPOINTMENT (OUTPATIENT)
Dept: CARDIOLOGY | Facility: CLINIC | Age: 58
End: 2023-06-13
Payer: MEDICARE

## 2023-06-13 ENCOUNTER — NON-APPOINTMENT (OUTPATIENT)
Age: 58
End: 2023-06-13

## 2023-06-13 VITALS
HEART RATE: 69 BPM | OXYGEN SATURATION: 94 % | BODY MASS INDEX: 41.75 KG/M2 | HEIGHT: 73 IN | WEIGHT: 315 LBS | SYSTOLIC BLOOD PRESSURE: 190 MMHG | DIASTOLIC BLOOD PRESSURE: 77 MMHG

## 2023-06-13 PROCEDURE — 93000 ELECTROCARDIOGRAM COMPLETE: CPT

## 2023-06-13 PROCEDURE — 99214 OFFICE O/P EST MOD 30 MIN: CPT

## 2023-07-11 NOTE — PHYSICAL EXAM
[4 x 4] : 4 x 4  [Abdominal Pad] : Abdominal Pad [2 x 2] : 2 x 2  [0] : left 0 [1+] : left 1+ [Skin Ulcer] : ulcer [Alert] : alert [Oriented to Person] : oriented to person [Oriented to Place] : oriented to place [Oriented to Time] : oriented to time [Calm] : calm [Varicose Veins Of Lower Extremities] : not present [] : not present [Purpura] : no purpura  [Petechiae] : no petechiae [Skin Induration] : no induration [de-identified] : adult WM, NAD, alert, Ox 3. [de-identified] : HTN, HLD [de-identified] : Diabetic Charcot right foot deformity [de-identified] : right foot plantar ulcer 5th metatarsal healing ulcer down to skin, subcutaneous tissue, and fat. ulcer lateral head of 5th metatarsal down to skin, subcutaneous tissue, fat. [de-identified] : Diabetic neuropathy  [FreeTextEntry1] : Foot 5th Lateral Digit- NEW [FreeTextEntry2] : 1.0 [FreeTextEntry3] : 1.0 [FreeTextEntry4] : 0.4 [de-identified] : serosanguineous [de-identified] : mildly  [de-identified] : 50% [de-identified] : Silver Alginate [de-identified] : Mechanically cleansed with 0.9%normal saline and sterile gauze\par  [FreeTextEntry7] : Plantar Foot  [FreeTextEntry8] : 0.5 [FreeTextEntry9] : 1.0 [de-identified] : 0.2 [de-identified] : Scant Serous/sanguinous [de-identified] : Callus  [de-identified] : Silver Alginate [de-identified] : Mechanically cleansed with sterile gauze and normal saline.\par Kerlix  [TWNoteComboBox1] : Right [TWNoteComboBox4] : Small [TWNoteComboBox5] : No [TWNoteComboBox6] : Pressure [de-identified] : No [de-identified] : Macerated [de-identified] : Mild [de-identified] : 50% [de-identified] : 3x Weekly [de-identified] : Primary Dressing [TWNoteComboBox9] : Right [de-identified] : other [de-identified] : None [de-identified] : None [de-identified] : 100% [de-identified] : No [de-identified] : 3x Weekly [de-identified] : Primary Dressing Bcc Infiltrative Histology Text: There were numerous aggregates of basaloid cells demonstrating an infiltrative pattern.

## 2023-07-12 ENCOUNTER — EMERGENCY (EMERGENCY)
Facility: HOSPITAL | Age: 58
LOS: 1 days | Discharge: HOME | End: 2023-07-12
Attending: EMERGENCY MEDICINE | Admitting: EMERGENCY MEDICINE
Payer: MEDICARE

## 2023-07-12 VITALS
RESPIRATION RATE: 19 BRPM | DIASTOLIC BLOOD PRESSURE: 80 MMHG | OXYGEN SATURATION: 97 % | SYSTOLIC BLOOD PRESSURE: 187 MMHG | HEART RATE: 89 BPM | TEMPERATURE: 97 F | WEIGHT: 315 LBS

## 2023-07-12 DIAGNOSIS — Z94.0 KIDNEY TRANSPLANT STATUS: Chronic | ICD-10-CM

## 2023-07-12 DIAGNOSIS — N18.6 END STAGE RENAL DISEASE: Chronic | ICD-10-CM

## 2023-07-12 DIAGNOSIS — Z98.890 OTHER SPECIFIED POSTPROCEDURAL STATES: Chronic | ICD-10-CM

## 2023-07-12 DIAGNOSIS — Z89.421 ACQUIRED ABSENCE OF OTHER RIGHT TOE(S): Chronic | ICD-10-CM

## 2023-07-12 PROCEDURE — 99283 EMERGENCY DEPT VISIT LOW MDM: CPT | Mod: 25

## 2023-07-12 PROCEDURE — 90715 TDAP VACCINE 7 YRS/> IM: CPT

## 2023-07-12 PROCEDURE — 90471 IMMUNIZATION ADMIN: CPT

## 2023-07-12 PROCEDURE — 99284 EMERGENCY DEPT VISIT MOD MDM: CPT

## 2023-07-12 RX ORDER — TETANUS TOXOID, REDUCED DIPHTHERIA TOXOID AND ACELLULAR PERTUSSIS VACCINE, ADSORBED 5; 2.5; 8; 8; 2.5 [IU]/.5ML; [IU]/.5ML; UG/.5ML; UG/.5ML; UG/.5ML
0.5 SUSPENSION INTRAMUSCULAR ONCE
Refills: 0 | Status: COMPLETED | OUTPATIENT
Start: 2023-07-12 | End: 2023-07-12

## 2023-07-12 RX ORDER — CEPHALEXIN 500 MG
500 CAPSULE ORAL ONCE
Refills: 0 | Status: COMPLETED | OUTPATIENT
Start: 2023-07-12 | End: 2023-07-12

## 2023-07-12 RX ADMIN — TETANUS TOXOID, REDUCED DIPHTHERIA TOXOID AND ACELLULAR PERTUSSIS VACCINE, ADSORBED 0.5 MILLILITER(S): 5; 2.5; 8; 8; 2.5 SUSPENSION INTRAMUSCULAR at 22:48

## 2023-07-12 RX ADMIN — Medication 500 MILLIGRAM(S): at 22:48

## 2023-07-12 RX ADMIN — Medication 1 TABLET(S): at 22:51

## 2023-07-12 NOTE — ED PROVIDER NOTE - CARE PROVIDER_API CALL
Kobe Miranda  Internal Medicine  [address], [address]  [place] [address]  Phone: [phone]  Fax: [phone]  Follow Up Time:

## 2023-07-12 NOTE — ED PROVIDER NOTE - PHYSICAL EXAMINATION
Left lower leg: +6 cm laceration, old to anterior mid shin.  Some surrounding erythema and minimal edema.  Normal distal strength and sensation equal bilaterally.  No proximal spread.  No lymphadenopathy.  No calf tenderness or swelling.  No other acute findings.  No acute discharge.

## 2023-07-12 NOTE — ED PROVIDER NOTE - NSFOLLOWUPINSTRUCTIONS_ED_ALL_ED_FT
1. Follow-up with your Primary Medical Doctor or referred doctor. Call today / next business day for prompt follow-up.  2. Return to Emergency room for any worsening or persistent pain, weakness, fever, dizziness, passing out, difficulty breathing, worsening or persistent redness or swelling to the leg, increased pain to the leg, or any other concerning symptoms.  3. See attached instruction sheets for additional information, including information regarding signs and symptoms to look out for, reasons to seek immediate care and other important instructions.  4.  Follow-up with the wound care center. Call tomorrow to make an appointment for prompt follow-up.  [facility] : [phone]  [facility]: [phone]  5.  Duricef twice daily for 7 days  6.  Bactrim twice daily for 7 days

## 2023-07-12 NOTE — ED PROVIDER NOTE - PROGRESS NOTE DETAILS
At length discussion with the patient regarding the nature of his infection, shared decision making, discussed high risk of worsening infection given his rejection meds, and diabetes.  Patient is aware, will follow-up with wound care center and his primary care doctor soon as possible, and will start antibiotics tonight.  Patient will return with any worsening or persistent pain, signs of infection, or any other issues or concerns.

## 2023-07-12 NOTE — ED ADULT NURSE NOTE - OBJECTIVE STATEMENT
received pt stable alert c/o lt leg redness pt evaluated and medicated with po antibiotic and tdap d/c home to follow up

## 2023-07-12 NOTE — ED PROVIDER NOTE - OBJECTIVE STATEMENT
57-year-old male with a history of hypertension, diabetes, CVA, kidney transplant on Prograf and azathioprine presents with left leg injury 4 days ago.  Patient hit his left shin on a piece of decking, causing a laceration to the shin.  Patient cleaned it well, however he never sought treatment to have it stitched.  Patient states it was pretty superficial.  Patient now with some surrounding erythema which he noticed today.  No fever or chills.  No numbness or tingling.  No focal weakness.  No discharge from the wound.  No acute pain.  No numbness/tingling/focal weakness.  No aggravating or alleviating factors otherwise noted.  No other acute injury or complaints.

## 2023-07-12 NOTE — ED PROVIDER NOTE - NSICDXPASTMEDICALHX_GEN_ALL_CORE_FT
PAST MEDICAL HISTORY:  CKD (chronic kidney disease) Renal Transplant 2013 at [facility]    CVA (cerebral vascular accident) [place] Stroke  low L body sensation  low R face sensation  decreased peripheral vision  poor balance  2015      Diabetes mellitus Type 1 on insulin pump    Diabetic peripheral neuropathy     History of DVT (deep vein thrombosis) LLE 2016, was on Eliquis x 6 months  Was hospitalized for Rhinovirus at the time, intubated    History of pleural effusion right thoracentesis 6/2022 and 8/2022    History of restrictive lung disease     Hyperlipidemia     Hypertension     Obesity (BMI 30-39.9)     CARMEN treated with BiPAP 2L O2 at night    Recurrent squamous cell carcinoma of skin nose, L arm    Squamous cell carcinoma of skin of left ear nose

## 2023-07-12 NOTE — ED ADULT NURSE NOTE - NSICDXPASTMEDICALHX_GEN_ALL_CORE_FT
PAST MEDICAL HISTORY:  CKD (chronic kidney disease) Renal Transplant 2013 at [facility]    CVA (cerebral vascular accident) [place] Stroke  low L body sensation  low R face sensation  decreased peripheral vision  poor balance  2015      Diabetes mellitus Type 1 on insulin pump    Diabetic peripheral neuropathy     History of DVT (deep vein thrombosis) LLE 2016, was on Eliquis x 6 months  Was hospitalized for Rhinovirus at the time, intubated    History of pleural effusion right thoracentesis 6/2022 and 8/2022    History of restrictive lung disease     Hyperlipidemia     Hypertension     Obesity (BMI 30-39.9)     CARMEN treated with BiPAP 2L O2 at night    Recurrent squamous cell carcinoma of skin nose, L arm    Squamous cell carcinoma of skin of left ear nose

## 2023-07-12 NOTE — ED PROVIDER NOTE - PATIENT PORTAL LINK FT
You can access the FollowMyHealth Patient Portal offered by [facility] by registering at the following website: [email or web address]. By joining [employer]’s FollowMyHealth portal, you will also be able to view your health information using other applications (apps) compatible with our system.

## 2023-07-14 ENCOUNTER — OUTPATIENT (OUTPATIENT)
Dept: OUTPATIENT SERVICES | Facility: HOSPITAL | Age: 58
LOS: 1 days | Discharge: HOME | End: 2023-07-14
Payer: MEDICARE

## 2023-07-14 ENCOUNTER — APPOINTMENT (OUTPATIENT)
Dept: WOUND CARE | Facility: HOSPITAL | Age: 58
End: 2023-07-14
Payer: MEDICARE

## 2023-07-14 VITALS
WEIGHT: 315 LBS | RESPIRATION RATE: 20 BRPM | HEART RATE: 63 BPM | TEMPERATURE: 98.6 F | DIASTOLIC BLOOD PRESSURE: 80 MMHG | HEIGHT: 73 IN | OXYGEN SATURATION: 95 % | BODY MASS INDEX: 41.75 KG/M2 | SYSTOLIC BLOOD PRESSURE: 153 MMHG

## 2023-07-14 DIAGNOSIS — Z89.421 ACQUIRED ABSENCE OF OTHER RIGHT TOE(S): Chronic | ICD-10-CM

## 2023-07-14 DIAGNOSIS — L97.801 NON-PRESSURE CHRONIC ULCER OF OTHER PART OF UNSPECIFIED LOWER LEG LIMITED TO BREAKDOWN OF SKIN: ICD-10-CM

## 2023-07-14 DIAGNOSIS — Z98.890 OTHER SPECIFIED POSTPROCEDURAL STATES: Chronic | ICD-10-CM

## 2023-07-14 DIAGNOSIS — Z94.0 KIDNEY TRANSPLANT STATUS: Chronic | ICD-10-CM

## 2023-07-14 DIAGNOSIS — S81.812D LACERATION W/OUT FOREIGN BODY, LEFT LOWER LEG, SUBSEQUENT ENCOUNTER: ICD-10-CM

## 2023-07-14 DIAGNOSIS — N18.6 END STAGE RENAL DISEASE: Chronic | ICD-10-CM

## 2023-07-14 PROCEDURE — 99214 OFFICE O/P EST MOD 30 MIN: CPT

## 2023-07-14 PROCEDURE — G0463: CPT

## 2023-07-14 RX ORDER — SULFAMETHOXAZOLE AND TRIMETHOPRIM 800; 160 MG/1; MG/1
800-160 TABLET ORAL
Refills: 0 | Status: ACTIVE | COMMUNITY

## 2023-07-14 NOTE — REASON FOR VISIT
[Follow-Up: _____] : a [unfilled] follow-up visit [Spouse] : spouse [FreeTextEntry1] : Pt. last in [facility] 4/20/23 for wound that closed.  Here today for new wound

## 2023-07-14 NOTE — ASSESSMENT
[Verbal] : Verbal [Demo] : Demo [Patient] : Patient [Good - alert, interested, motivated] : Good - alert, interested, motivated [Verbalizes knowledge/Understanding] : Verbalizes knowledge/understanding [Dressing changes] : dressing changes [Skin Care] : skin care [Signs and symptoms of infection] : sign and symptoms of infection [Nutrition] : nutrition [How and When to Call] : how and when to call [Patient responsibility to plan of care] : patient responsibility to plan of care [] : Yes [Stable] : stable [Home] : Home [Ambulatory] : Ambulatory [Not Applicable - Long Term Care/Home Health Agency] : Long Term Care/Home Health Agency: Not Applicable [FreeTextEntry2] : Infection control and prevention\par Safety\par Compliance R/T antibiotics course.\par Nutrition and wound healing \par  [FreeTextEntry4] : Wound assessed by MD -  wound scabbed over - continue antibiotics course. If periwound reddens, warms up, return to ER as needed.\par Follow up in one week

## 2023-07-14 NOTE — REVIEW OF SYSTEMS
[Fever] : no fever [Chills] : no chills [Eye Pain] : no eye pain [Loss Of Hearing] : no hearing loss [Shortness Of Breath] : no shortness of breath [Abdominal Pain] : no abdominal pain [Vomiting] : no vomiting [Skin Lesions] : no skin lesions [Skin Wound] : no skin wound [Anxiety] : no anxiety [Easy Bleeding] : no tendency for easy bleeding [FreeTextEntry5] : HTN,HLD [FreeTextEntry7] : 40.9 BMI , morbid obesity  [FreeTextEntry8] : Kidney Transplant  [FreeTextEntry9] : Associated Right Diabetic Charcot foot status post right Achilles tendon lengthening, right posterior tibial tendon lengthening, right peroneus brevis tendon detachment and reattachment with exostectomy of the right fifth metatarsal base and plantar cuboid, and right fifth digit proximal phalanx base resection [de-identified] : plantar right foot midfoot plantar lateral and right forefoot lateral DFU 2, all the ulcers have healed. [de-identified] : IDDM with neuropathy  [de-identified] : MARILUZ

## 2023-07-14 NOTE — HISTORY OF PRESENT ILLNESS
[FreeTextEntry1] : Patient into [facility] for new traumatic wound to Left lower leg.  Hit with a piece of wood on Saturday 7/8/23 while dismantling deck.  Initially cleaned well at home.  Went to ER 7/12/23 for increased redness and warmth.  Given Rx for Bactrim and Cefdroxil po, which he began in the evening on 7/13/23. Currently wound scabbed over with no drainage and minimal erythema.\par 7/14/23 patient has noted that the edema and erythema has improved significantly since starting the antibiotics \par 36 hours ago. Stressed need to elevate left leg, finish antibiotic, use of bacitracin and DD QD. Stressed if condition should worsen to return to ER

## 2023-07-14 NOTE — PLAN
[FreeTextEntry1] : traumatic wound left calf\par stressed elevation\par topical bacitracin ointment and DD QD\par return 1 week\par 30 minutes spent in review,evaluation and treatment planning

## 2023-07-14 NOTE — PHYSICAL EXAM
[4 x 4] : 4 x 4  [0] : left 0 [1+] : left 1+ [Varicose Veins Of Lower Extremities] : not present [] : not present [Purpura] : no purpura  [Petechiae] : no petechiae [Skin Ulcer] : ulcer [Skin Induration] : no induration [Alert] : alert [Oriented to Person] : oriented to person [Oriented to Place] : oriented to place [Oriented to Time] : oriented to time [Calm] : calm [de-identified] : adult WM, NAD, alert, Ox 3. [de-identified] : HTN, HLD [de-identified] : Diabetic Charcot right foot deformity status post right Achilles tendon lengthening, right posterior tibial tendon lengthening, right peroneus brevis tendon detachment and reattachment with exostectomy of the right fifth metatarsal base and plantar cuboid, and right fifth digit proximal phalanx base resection [de-identified] : several scabbed over abrasions left lateral anterior calf area with mild erthema in periwound. Some lower leg edema noted and very mild erythema [de-identified] : Diabetic neuropathy  [FreeTextEntry1] : Left Lower Leg [de-identified] : None [de-identified] : mechanical cleanse with 0.9% Normal saline [TWNoteComboBox4] : None [TWNoteComboBox5] : No [TWNoteComboBox6] : Traumatic [de-identified] : No [de-identified] : Normal [de-identified] : None [de-identified] : None [de-identified] : 100% [de-identified] : Daily

## 2023-07-15 DIAGNOSIS — Z79.4 LONG TERM (CURRENT) USE OF INSULIN: ICD-10-CM

## 2023-07-15 DIAGNOSIS — Z86.718 PERSONAL HISTORY OF OTHER VENOUS THROMBOSIS AND EMBOLISM: ICD-10-CM

## 2023-07-15 DIAGNOSIS — Z98.890 OTHER SPECIFIED POSTPROCEDURAL STATES: ICD-10-CM

## 2023-07-15 DIAGNOSIS — Z86.16 PERSONAL HISTORY OF COVID-19: ICD-10-CM

## 2023-07-15 DIAGNOSIS — S81.812D LACERATION WITHOUT FOREIGN BODY, LEFT LOWER LEG, SUBSEQUENT ENCOUNTER: ICD-10-CM
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
## 2023-07-15 DIAGNOSIS — Z79.899 OTHER LONG TERM (CURRENT) DRUG THERAPY: ICD-10-CM
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
## 2023-07-15 DIAGNOSIS — Z83.3 FAMILY HISTORY OF DIABETES MELLITUS: ICD-10-CM

## 2023-07-15 DIAGNOSIS — I10 ESSENTIAL (PRIMARY) HYPERTENSION: ICD-10-CM

## 2023-07-15 DIAGNOSIS — Z80.3 FAMILY HISTORY OF MALIGNANT NEOPLASM OF BREAST: ICD-10-CM

## 2023-07-15 DIAGNOSIS — G47.33 OBSTRUCTIVE SLEEP APNEA (ADULT) (PEDIATRIC): ICD-10-CM

## 2023-07-15 DIAGNOSIS — Z94.0 KIDNEY TRANSPLANT STATUS: ICD-10-CM

## 2023-07-15 DIAGNOSIS — E11.51 TYPE 2 DIABETES MELLITUS WITH DIABETIC PERIPHERAL ANGIOPATHY WITHOUT GANGRENE: ICD-10-CM

## 2023-07-15 DIAGNOSIS — Z85.828 PERSONAL HISTORY OF OTHER MALIGNANT NEOPLASM OF SKIN: ICD-10-CM

## 2023-07-15 DIAGNOSIS — Z87.81 PERSONAL HISTORY OF (HEALED) TRAUMATIC FRACTURE: ICD-10-CM

## 2023-07-15 DIAGNOSIS — Z86.73 PERSONAL HISTORY OF TRANSIENT ISCHEMIC ATTACK (TIA), AND CEREBRAL INFARCTION WITHOUT RESIDUAL DEFICITS: ICD-10-CM

## 2023-07-15 DIAGNOSIS — E11.610 TYPE 2 DIABETES MELLITUS WITH DIABETIC NEUROPATHIC ARTHROPATHY: ICD-10-CM

## 2023-07-15 DIAGNOSIS — E78.5 HYPERLIPIDEMIA, UNSPECIFIED: ICD-10-CM

## 2023-07-15 DIAGNOSIS — Z89.422 ACQUIRED ABSENCE OF OTHER LEFT TOE(S): ICD-10-CM
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
## 2023-07-15 DIAGNOSIS — Z89.421 ACQUIRED ABSENCE OF OTHER RIGHT TOE(S): ICD-10-CM

## 2023-07-15 DIAGNOSIS — Z80.8 FAMILY HISTORY OF MALIGNANT NEOPLASM OF OTHER ORGANS OR SYSTEMS: ICD-10-CM

## 2023-07-17 ENCOUNTER — APPOINTMENT (OUTPATIENT)
Dept: PODIATRY | Facility: CLINIC | Age: 58
End: 2023-07-17
Payer: MEDICARE

## 2023-07-17 VITALS
HEART RATE: 67 BPM | TEMPERATURE: 98 F | HEIGHT: 73 IN | BODY MASS INDEX: 41.75 KG/M2 | OXYGEN SATURATION: 96 % | DIASTOLIC BLOOD PRESSURE: 66 MMHG | SYSTOLIC BLOOD PRESSURE: 119 MMHG | WEIGHT: 315 LBS

## 2023-07-17 PROCEDURE — 11721 DEBRIDE NAIL 6 OR MORE: CPT

## 2023-07-17 PROCEDURE — 99213 OFFICE O/P EST LOW 20 MIN: CPT | Mod: 25

## 2023-07-17 NOTE — REVIEW OF SYSTEMS
[Fever] : no fever [Chills] : no chills [Eye Pain] : no eye pain [Loss Of Hearing] : no hearing loss [Shortness Of Breath] : no shortness of breath [Abdominal Pain] : no abdominal pain [Vomiting] : no vomiting [Skin Lesions] : no skin lesions [Skin Wound] : no skin wound [Anxiety] : no anxiety [Easy Bleeding] : no tendency for easy bleeding [FreeTextEntry5] : HTN,HLD [FreeTextEntry7] : 40.9 BMI , morbid obesity  [FreeTextEntry8] : Kidney Transplant  [FreeTextEntry9] : Associated Right Diabetic Charcot foot status post right Achilles tendon lengthening, right posterior tibial tendon lengthening, right peroneus brevis tendon detachment and reattachment with exostectomy of the right fifth metatarsal base and plantar cuboid, and right fifth digit proximal phalanx base resection [de-identified] : IDDM with neuropathy  [de-identified] : plantar right foot midfoot plantar lateral and right forefoot lateral DFU 2, all the ulcers have healed. [de-identified] : MARILUZ

## 2023-07-17 NOTE — ASSESSMENT
[FreeTextEntry1] : Discussed various treatment options for management of mycotic nails, including topical vs. oral vs. laser intervention. The offending nail margins were mechanically and electrically debrided to the level of normal underlying nail bed with good relief obtained as evidenced by pain-free ambulation.  The patient was instructed to attempt to maintain the length and thickness of their nails as best as possible. Spent 20 minutes for patient care and medical decision making.\par

## 2023-07-17 NOTE — PHYSICAL EXAM
[General Appearance - In No Acute Distress] : in no acute distress [General Appearance - Alert] : alert [General Appearance - Well Nourished] : well nourished [General Appearance - Well Developed] : well developed [Ankle Swelling (On Exam)] : not present [Varicose Veins Of Lower Extremities] : not present [] : not present [2+] : left foot dorsalis pedis 2+ [de-identified] : 4/5 strength in all quadrants bilaterally, s/p right posterior tibial tendon lengthening, achilles tendon lengthening, 5th metatarsal base exostectomy with peroneus longus tendon detachment and reattachment, s/p bilateral 2nd and 3rd digit distal amputations. [FreeTextEntry1] : Loss of light touch sensation bilaterally

## 2023-07-17 NOTE — HISTORY OF PRESENT ILLNESS
[FreeTextEntry1] : Patient presents complaining of thickened, elongated nails to the right hallux, right 4th and 5th digits, left hallux, left 4th and 5th digits. Patient relates that it has been present for approximately several years and notes that it has been worsening. Patient does not recall trauma to the nails in the past. Patient relates that the nails are not  from the nail bed. Patient states that the nails are thickened. Patient states that the nails are not soft. Patient states that the nails are not easily breakable. Patient notes that they have not tried to treat their nails prior.\par

## 2023-07-18 NOTE — ED ADULT NURSE NOTE - NS_SISCREENINGSR_GEN_ALL_ED
Recommend FU with PCP, return to the ED if symptoms worsen. Results will be on mychart. You may also call back into the ED. Negative

## 2023-07-21 ENCOUNTER — APPOINTMENT (OUTPATIENT)
Dept: WOUND CARE | Facility: HOSPITAL | Age: 58
End: 2023-07-21

## 2023-07-25 ENCOUNTER — APPOINTMENT (OUTPATIENT)
Dept: DERMATOLOGY | Facility: CLINIC | Age: 58
End: 2023-07-25
Payer: MEDICARE

## 2023-07-25 DIAGNOSIS — B07.9 VIRAL WART, UNSPECIFIED: ICD-10-CM

## 2023-07-25 DIAGNOSIS — L57.0 ACTINIC KERATOSIS: ICD-10-CM

## 2023-07-25 DIAGNOSIS — Z12.83 ENCOUNTER FOR SCREENING FOR MALIGNANT NEOPLASM OF SKIN: ICD-10-CM

## 2023-07-25 DIAGNOSIS — D22.9 MELANOCYTIC NEVI, UNSPECIFIED: ICD-10-CM

## 2023-07-25 DIAGNOSIS — Z78.9 OTHER SPECIFIED HEALTH STATUS: ICD-10-CM

## 2023-07-25 PROCEDURE — 99203 OFFICE O/P NEW LOW 30 MIN: CPT | Mod: 25

## 2023-07-25 PROCEDURE — 17000 DESTRUCT PREMALG LESION: CPT | Mod: 59

## 2023-07-25 PROCEDURE — 17110 DESTRUCTION B9 LES UP TO 14: CPT | Mod: 59

## 2023-09-12 ENCOUNTER — NON-APPOINTMENT (OUTPATIENT)
Age: 58
End: 2023-09-12

## 2023-09-12 ENCOUNTER — APPOINTMENT (OUTPATIENT)
Dept: CARDIOLOGY | Facility: CLINIC | Age: 58
End: 2023-09-12
Payer: MEDICARE

## 2023-09-12 VITALS
SYSTOLIC BLOOD PRESSURE: 190 MMHG | HEIGHT: 73 IN | WEIGHT: 315 LBS | DIASTOLIC BLOOD PRESSURE: 82 MMHG | OXYGEN SATURATION: 95 % | BODY MASS INDEX: 41.75 KG/M2 | HEART RATE: 64 BPM

## 2023-09-12 VITALS — SYSTOLIC BLOOD PRESSURE: 160 MMHG | DIASTOLIC BLOOD PRESSURE: 72 MMHG

## 2023-09-12 PROCEDURE — 99214 OFFICE O/P EST MOD 30 MIN: CPT

## 2023-09-12 PROCEDURE — 93000 ELECTROCARDIOGRAM COMPLETE: CPT

## 2023-09-12 RX ORDER — METOPROLOL TARTRATE 50 MG/1
50 TABLET, FILM COATED ORAL
Qty: 180 | Refills: 3 | Status: DISCONTINUED | COMMUNITY
Start: 2018-07-30 | End: 2023-09-12

## 2023-10-16 ENCOUNTER — APPOINTMENT (OUTPATIENT)
Dept: PODIATRY | Facility: CLINIC | Age: 58
End: 2023-10-16
Payer: MEDICARE

## 2023-10-16 VITALS
HEIGHT: 73 IN | OXYGEN SATURATION: 93 % | DIASTOLIC BLOOD PRESSURE: 76 MMHG | BODY MASS INDEX: 41.75 KG/M2 | SYSTOLIC BLOOD PRESSURE: 155 MMHG | TEMPERATURE: 98 F | HEART RATE: 62 BPM | WEIGHT: 315 LBS

## 2023-10-16 DIAGNOSIS — B35.1 TINEA UNGUIUM: ICD-10-CM

## 2023-10-16 PROCEDURE — 11720 DEBRIDE NAIL 1-5: CPT | Mod: 59

## 2023-10-16 PROCEDURE — 11055 PARING/CUTG B9 HYPRKER LES 1: CPT

## 2023-10-21 PROBLEM — B35.1 NAIL FUNGUS: Status: ACTIVE | Noted: 2023-07-17

## 2023-10-30 ENCOUNTER — APPOINTMENT (OUTPATIENT)
Dept: ULTRASOUND IMAGING | Facility: CLINIC | Age: 58
End: 2023-10-30
Payer: MEDICARE

## 2023-10-30 PROCEDURE — 76776 US EXAM K TRANSPL W/DOPPLER: CPT | Mod: RT

## 2023-11-02 NOTE — ED ADULT NURSE NOTE - NSSUSCREENINGQ4_ED_ALL_ED
No Detail Level: Simple Render Risk Assessment In Note?: no Additional Notes: Initially resolved with ciclopirox. Patient works as . Discussed hot/wet environments. Discussed household pets as vectors.

## 2023-11-02 NOTE — ED PROVIDER NOTE - PMH
Patient LOV 9/6/23. NOV 11/15/23. Refills sent for glucagon.     
CKD (chronic kidney disease)    CVA (cerebral vascular accident)    Diabetes mellitus    Diabetic peripheral neuropathy    Hyperlipidemia    Hypertension    Obesity (BMI 30-39.9)

## 2023-12-05 ENCOUNTER — APPOINTMENT (OUTPATIENT)
Dept: CARDIOLOGY | Facility: CLINIC | Age: 58
End: 2023-12-05
Payer: MEDICARE

## 2023-12-05 VITALS
SYSTOLIC BLOOD PRESSURE: 187 MMHG | HEIGHT: 73 IN | DIASTOLIC BLOOD PRESSURE: 76 MMHG | OXYGEN SATURATION: 95 % | BODY MASS INDEX: 41.75 KG/M2 | HEART RATE: 72 BPM | WEIGHT: 315 LBS

## 2023-12-05 PROCEDURE — 93000 ELECTROCARDIOGRAM COMPLETE: CPT

## 2023-12-05 PROCEDURE — 99214 OFFICE O/P EST MOD 30 MIN: CPT

## 2023-12-05 RX ORDER — CARVEDILOL 12.5 MG/1
12.5 TABLET, FILM COATED ORAL TWICE DAILY
Qty: 180 | Refills: 3 | Status: ACTIVE | COMMUNITY
Start: 2023-09-12 | End: 1900-01-01

## 2023-12-05 RX ORDER — NIFEDIPINE 30 MG/1
30 TABLET, EXTENDED RELEASE ORAL
Refills: 0 | Status: ACTIVE | COMMUNITY
Start: 2023-12-05

## 2023-12-05 RX ORDER — HYDRALAZINE HYDROCHLORIDE 100 MG/1
100 TABLET ORAL
Qty: 270 | Refills: 3 | Status: ACTIVE | COMMUNITY
Start: 2017-05-22 | End: 1900-01-01

## 2023-12-11 ENCOUNTER — LABORATORY RESULT (OUTPATIENT)
Age: 58
End: 2023-12-11

## 2023-12-11 ENCOUNTER — APPOINTMENT (OUTPATIENT)
Dept: NEPHROLOGY | Facility: CLINIC | Age: 58
End: 2023-12-11
Payer: MEDICARE

## 2023-12-11 VITALS
OXYGEN SATURATION: 95 % | TEMPERATURE: 98 F | DIASTOLIC BLOOD PRESSURE: 72 MMHG | HEART RATE: 70 BPM | RESPIRATION RATE: 20 BRPM | BODY MASS INDEX: 41.75 KG/M2 | SYSTOLIC BLOOD PRESSURE: 128 MMHG | WEIGHT: 315 LBS | HEIGHT: 73 IN

## 2023-12-11 DIAGNOSIS — D84.9 IMMUNODEFICIENCY, UNSPECIFIED: ICD-10-CM

## 2023-12-11 DIAGNOSIS — E11.9 TYPE 2 DIABETES MELLITUS W/OUT COMPLICATIONS: ICD-10-CM

## 2023-12-11 PROCEDURE — 99214 OFFICE O/P EST MOD 30 MIN: CPT

## 2023-12-14 LAB
ALBUMIN SERPL ELPH-MCNC: 3.9 G/DL
ALP BLD-CCNC: 116 U/L
ALT SERPL-CCNC: 19 U/L
ANION GAP SERPL CALC-SCNC: 11 MMOL/L
APPEARANCE: CLEAR
AST SERPL-CCNC: 18 U/L
BASOPHILS # BLD AUTO: 0.05 K/UL
BASOPHILS NFR BLD AUTO: 0.6 %
BILIRUB SERPL-MCNC: 0.3 MG/DL
BILIRUBIN URINE: NEGATIVE
BKV DNA SPEC QL NAA+PROBE: NOT DETECTED IU/ML
BLOOD URINE: NEGATIVE
BUN SERPL-MCNC: 27 MG/DL
CALCIUM SERPL-MCNC: 9.4 MG/DL
CHLORIDE SERPL-SCNC: 101 MMOL/L
CHOLEST SERPL-MCNC: 135 MG/DL
CO2 SERPL-SCNC: 28 MMOL/L
COLOR: NORMAL
CREAT SERPL-MCNC: 1.35 MG/DL
CREAT SPEC-SCNC: 176 MG/DL
CREAT/PROT UR: 0.1 RATIO
EGFR: 61 ML/MIN/1.73M2
EOSINOPHIL # BLD AUTO: 0.35 K/UL
EOSINOPHIL NFR BLD AUTO: 4.4 %
ESTIMATED AVERAGE GLUCOSE: 160 MG/DL
GLUCOSE QUALITATIVE U: NEGATIVE MG/DL
GLUCOSE SERPL-MCNC: 122 MG/DL
HBA1C MFR BLD HPLC: 7.2 %
HCT VFR BLD CALC: 40.2 %
HDLC SERPL-MCNC: 44 MG/DL
HGB BLD-MCNC: 13.1 G/DL
IMM GRANULOCYTES NFR BLD AUTO: 0.4 %
KETONES URINE: ABNORMAL MG/DL
LDH SERPL-CCNC: 189 U/L
LDLC SERPL CALC-MCNC: 74 MG/DL
LEUKOCYTE ESTERASE URINE: ABNORMAL
LYMPHOCYTES # BLD AUTO: 1.01 K/UL
LYMPHOCYTES NFR BLD AUTO: 12.6 %
MAGNESIUM SERPL-MCNC: 2.1 MG/DL
MAN DIFF?: NORMAL
MCHC RBC-ENTMCNC: 29.6 PG
MCHC RBC-ENTMCNC: 32.6 GM/DL
MCV RBC AUTO: 91 FL
MONOCYTES # BLD AUTO: 0.66 K/UL
MONOCYTES NFR BLD AUTO: 8.3 %
NEUTROPHILS # BLD AUTO: 5.9 K/UL
NEUTROPHILS NFR BLD AUTO: 73.7 %
NITRITE URINE: NEGATIVE
NONHDLC SERPL-MCNC: 91 MG/DL
PH URINE: 5.5
PHOSPHATE SERPL-MCNC: 3.8 MG/DL
PLATELET # BLD AUTO: 236 K/UL
POTASSIUM SERPL-SCNC: 4.7 MMOL/L
PROT SERPL-MCNC: 6.7 G/DL
PROT UR-MCNC: 18 MG/DL
PROTEIN URINE: NORMAL MG/DL
RBC # BLD: 4.42 M/UL
RBC # FLD: 13.8 %
SODIUM SERPL-SCNC: 140 MMOL/L
SPECIFIC GRAVITY URINE: 1.02
TACROLIMUS SERPL-MCNC: 4.8 NG/ML
TRIGL SERPL-MCNC: 88 MG/DL
URATE SERPL-MCNC: 5.4 MG/DL
UROBILINOGEN URINE: 1 MG/DL
WBC # FLD AUTO: 8 K/UL

## 2023-12-19 RX ORDER — CLONIDINE HYDROCHLORIDE 0.2 MG/1
0.2 TABLET ORAL 3 TIMES DAILY
Qty: 270 | Refills: 3 | Status: ACTIVE | COMMUNITY
Start: 2017-05-22

## 2024-01-02 NOTE — H&P ADULT - NSICDXPASTMEDICALHX_GEN_ALL_CORE_FT
Call Center TCM Note      Flowsheet Row Responses   [facility] facility patient discharged from? [facility]   Does the patient have one of the following disease processes/diagnoses(primary or secondary)? Other   TCM attempt successful? No   Unsuccessful attempts Attempt 1            Kiley Matos LPN    1/2/2024, 08:42 EST         PAST MEDICAL HISTORY:  CKD (chronic kidney disease) Renal Transplant 2013 at [facility]    CVA (cerebral vascular accident) [place] Stroke  low L body sensation  low R face sensation  decreased peripheral vision  poor balance  2015      Diabetes mellitus Type 1 on insulin pump    Diabetic peripheral neuropathy     History of DVT (deep vein thrombosis) LLE 2016, was on Eliquis x 6 months  Was hospitalized for Rhinovirus at the time, intubated    History of pleural effusion right thoracentesis 6/2022 and 8/2022    History of restrictive lung disease     Hyperlipidemia     Hypertension     Obesity (BMI 30-39.9)     CARMEN treated with BiPAP 2L O2 at night    Recurrent squamous cell carcinoma of skin nose, L arm    Squamous cell carcinoma of skin of left ear nose

## 2024-01-17 ENCOUNTER — NON-APPOINTMENT (OUTPATIENT)
Age: 59
End: 2024-01-17

## 2024-01-22 ENCOUNTER — APPOINTMENT (OUTPATIENT)
Dept: PODIATRY | Facility: CLINIC | Age: 59
End: 2024-01-22
Payer: MEDICARE

## 2024-01-22 PROCEDURE — 11720 DEBRIDE NAIL 1-5: CPT | Mod: 59

## 2024-01-22 PROCEDURE — 11056 PARNG/CUTG B9 HYPRKR LES 2-4: CPT

## 2024-01-30 ENCOUNTER — RX RENEWAL (OUTPATIENT)
Age: 59
End: 2024-01-30

## 2024-02-06 ENCOUNTER — NON-APPOINTMENT (OUTPATIENT)
Age: 59
End: 2024-02-06

## 2024-02-06 ENCOUNTER — APPOINTMENT (OUTPATIENT)
Dept: CARDIOLOGY | Facility: CLINIC | Age: 59
End: 2024-02-06
Payer: MEDICARE

## 2024-02-06 VITALS — SYSTOLIC BLOOD PRESSURE: 126 MMHG | DIASTOLIC BLOOD PRESSURE: 70 MMHG

## 2024-02-06 VITALS
OXYGEN SATURATION: 95 % | HEART RATE: 73 BPM | WEIGHT: 315 LBS | HEIGHT: 73 IN | SYSTOLIC BLOOD PRESSURE: 163 MMHG | DIASTOLIC BLOOD PRESSURE: 75 MMHG | BODY MASS INDEX: 41.75 KG/M2

## 2024-02-06 DIAGNOSIS — E78.5 HYPERLIPIDEMIA, UNSPECIFIED: ICD-10-CM

## 2024-02-06 DIAGNOSIS — I10 ESSENTIAL (PRIMARY) HYPERTENSION: ICD-10-CM

## 2024-02-06 PROCEDURE — 99214 OFFICE O/P EST MOD 30 MIN: CPT

## 2024-02-06 PROCEDURE — 93000 ELECTROCARDIOGRAM COMPLETE: CPT

## 2024-02-06 PROCEDURE — G2211 COMPLEX E/M VISIT ADD ON: CPT

## 2024-02-06 RX ORDER — CEFADROXIL 500 MG/1
500 CAPSULE ORAL
Refills: 0 | Status: DISCONTINUED | COMMUNITY
End: 2024-02-06

## 2024-02-06 RX ORDER — NIFEDIPINE 30 MG/1
30 TABLET, EXTENDED RELEASE ORAL
Qty: 180 | Refills: 3 | Status: DISCONTINUED | COMMUNITY
Start: 2023-12-11 | End: 2024-02-06

## 2024-02-06 NOTE — HISTORY OF PRESENT ILLNESS
[FreeTextEntry1] : 59 y/o M with HTN, HLD, T1DM (on Insulin pump), CKD s/p renal transplant, CVA with residual, DVT s/p Eliquis, CARMEN (on nocturnal CPAP), Restrictive Lung Disease, squamous cell carcinoma, OM Left 4th toe s/p amputation, and right 2nd toe amputation presented to the office for a cardiac evaluation.  IN the spring of 2022 he was noted to have a right sided pleural effusion, and had thoracentesis done at [place].  He again had thoracentesis with 1 L drained in August of 2022.  He has intermittent LE edema worse at the end of the day.  I sent him for an echocardiogram, and this showed normal LV function with no significant valvular disease.  He had a stress test, and this showed no evidence of ischemia.   He is now s/p for right VATS, thoracotomy, decortication. Early in 2023 he had foot surgery at [place] as well.  He is back to walking on his foot.    He is not having increased dyspnea, chest pains, PND, orthopnea, dizziness, or syncope.  His LE edema is at baseline.  HIs BP is controlled today.  He had recent PNA and completed antibiotics and steroids.

## 2024-02-06 NOTE — PHYSICAL EXAM
[Well Developed] : well developed [Well Nourished] : well nourished [No Acute Distress] : no acute distress [Normal Conjunctiva] : normal conjunctiva [Normal Venous Pressure] : normal venous pressure [No Carotid Bruit] : no carotid bruit [Normal S1, S2] : normal S1, S2 [No Rub] : no rub [No Gallop] : no gallop [Clear Lung Fields] : clear lung fields [Good Air Entry] : good air entry [No Respiratory Distress] : no respiratory distress  [Soft] : abdomen soft [Non Tender] : non-tender [No Masses/organomegaly] : no masses/organomegaly [Normal Bowel Sounds] : normal bowel sounds [Normal Gait] : normal gait [No Cyanosis] : no cyanosis [No Clubbing] : no clubbing [No Varicosities] : no varicosities [No Rash] : no rash [No Skin Lesions] : no skin lesions [Moves all extremities] : moves all extremities [No Focal Deficits] : no focal deficits [Normal Speech] : normal speech [Alert and Oriented] : alert and oriented [Normal memory] : normal memory [de-identified] : 1/6 systolic murmur left sternal border [de-identified] : Decreased breath sounds right base.  [de-identified] : 1 + b/l LE edema

## 2024-02-06 NOTE — DISCUSSION/SUMMARY
[FreeTextEntry1] : 59 y/o M with HTN, HLD, T1DM (on Insulin pump), CKD s/p renal transplant, CVA with residual, DVT s/p Eliquis, CARMEN (on nocturnal CPAP), Restrictive Lung Disease, squamous cell carcinoma, OM Left 4th toe s/p amputation, and right 2nd toe amputation presented to the office for a cardiac evaluation.  IN the spring of 2022 he was noted to have a right sided pleural effusion, and had thoracentesis done at [place].  He again had thoracentesis with 1 L drained in August.  He has intermittent LE edema worse at the end of the day.  I sent him for an echocardiogram, and this showed normal LV function with no significant valvular disease.  He had a stress test, and this showed no evidence of ischemia.   He is now s/p for right VATS, thoracotomy, decortication.  IN early 2023 he also had foot surgery.     HIs BP is now controlled.  He will continue clonidine 0.2 tid.  He will continue hydralazine 100 tid.    He will continue losartan 25 QD.  He will continue carvedilol 12.5 bid.  I increased nifedipine to 30 TID, and this seems to be helping his BP.  He will continue his current dose of statin drug.  HE will follow in 6 months for a blood pressure check.  He knows to call the office with any issues.   [EKG obtained to assist in diagnosis and management of assessed problem(s)] : EKG obtained to assist in diagnosis and management of assessed problem(s)

## 2024-02-27 NOTE — REVIEW OF SYSTEMS
[Skin Lesions] : skin lesion [Fever] : no fever [Chills] : no chills [Eye Pain] : no eye pain [Earache] : no earache [Red Eyes] : eyes not red [Nosebleeds] : no nosebleeds [Chest Pain] : no chest pain [Shortness Of Breath] : no shortness of breath [Cough] : no cough [Abdominal Pain] : no abdominal pain [Diarrhea] : no diarrhea [Vomiting] : no vomiting [Joint Swelling] : no joint swelling [Limb Pain] : no limb pain [Anxiety] : no anxiety [Skin Wound] : no skin wound [Easy Bleeding] : no tendency for easy bleeding [FreeTextEntry5] : HTN,HLD [FreeTextEntry7] : 40.9 BMI , morbid obesity  [FreeTextEntry8] : Kidney Transplant  [FreeTextEntry9] : Associated Right Diabetic Charcot foot status post right Achilles tendon lengthening, right posterior tibial tendon lengthening, right peroneus brevis tendon detachment and reattachment with exostectomy of the right fifth metatarsal base and plantar cuboid, and right fifth digit proximal phalanx base resection [de-identified] : HPK lesion to the plantar right foot sub metatarsal base and left foot distal 5th digit; thickened and elongated nails of b/l hallux, 4th and 5th digits; plantar right midfoot , plantar lateral and right forefoot lateral DFU 2, all the ulcers have healed [de-identified] : MARILUZ [de-identified] : IDDM with neuropathy

## 2024-02-27 NOTE — PHYSICAL EXAM
[General Appearance - Alert] : alert [General Appearance - In No Acute Distress] : in no acute distress [2+] : right foot dorsalis pedis 2+ [Oriented To Time, Place, And Person] : oriented to person, place, and time [Affect] : the affect was normal [Varicose Veins Of Lower Extremities] : not present [Ankle Swelling (On Exam)] : not present [] : not present [de-identified] : 4/5 strength in all quadrants bilaterally, s/p right posterior tibial tendon lengthening, achilles tendon lengthening, 5th metatarsal base exostectomy with peroneus longus tendon detachment and reattachment, s/p bilateral 2nd and 3rd digit distal amputations. [FreeTextEntry1] : Loss of light touch sensation bilaterally

## 2024-02-27 NOTE — ASSESSMENT
[FreeTextEntry1] : Patient has been examined and evaluated. All of the patient's questions and concerns have been addressed. Discussed etiology and pt advised regarding possible etiology of symptoms. Debrided nails of b/l hallux, 5th digits and left 3rd diit using sterile nail nippers and aseptic technique without incident. The offending nail margins were mechanically and electrically debrided to the level of normal underlying nail bed with good relief obtained as evidenced by pain-free ambulation.  The patient was instructed to attempt to maintain the length and thickness of their nails as best as possible. Discussed with patient to wear supportive shoe gear with wide toed shoes and extra depth in toe box.  Debrided the lesions x 4 full thickness, recommended use of Vaseline or similar product to decrease friction, stretched shoes, instructed patient to use appropriate shoegear. Discussed with patient to perform daily foot checks and monitor for any new lesions, open wounds or calluses and to return to clinic at the earliest. Patient will benefit from diabetic shoes with multi- density plastazote inserts to assist and stabilize in gait Patient has been advised to continue using Aquaphor at home to moisturize his feet on a daily basis.  Advised patient to continue applying Ciclopirox to the affected nails on a daily basis; patient has been reminded of application instructions.  Patient is high risk for developing infection from pressure calluses and patient has history of amputations.  RTC in 9 weeks for follow up to ensure that skin is not breaking down.  Spent 20 minutes for patient care and medical decision making.

## 2024-02-27 NOTE — REASON FOR VISIT
[Follow-Up Visit] : a follow-up visit for [FreeTextEntry2] : Patient presents with concerns for b/l hallux, 4th and 5th digits painful, thickened and elongated nails.

## 2024-02-27 NOTE — HISTORY OF PRESENT ILLNESS
[FreeTextEntry1] : 58 year old male presents complaining of painful, thickened, elongated nails of b/l hallux, 4th and 5th digits. Patient relates that he experiences worsened pain when walking and moving around with shoes. Patient states that he has been applying Ciclopirox to the nails on a daily basis as instructed in the previous office visit. Patient does not recall trauma to the nails recently. Patient mentions that he has been regularly seeing his cardiologist, endocrinologist, and nephrologist.  Patient also presents for follow up complaining of painful lesions to the right foot and he has tried to treat them at home with daily application of Bag Balm.   HgA1c - 7.1% Most recent BG - 130 mg/dL Recent PCP visit - Patient does not recall Numbness, tingling or burning - No, experienced in neither foot  PO or insulin - BD insulin

## 2024-02-29 RX ORDER — TACROLIMUS 0.5 MG/1
0.5 CAPSULE ORAL
Qty: 180 | Refills: 3 | Status: ACTIVE | COMMUNITY
Start: 2023-03-20 | End: 1900-01-01

## 2024-03-11 DIAGNOSIS — Z94.0 KIDNEY TRANSPLANT STATUS: ICD-10-CM

## 2024-03-11 RX ORDER — SULFAMETHOXAZOLE AND TRIMETHOPRIM 400; 80 MG/1; MG/1
400-80 TABLET ORAL
Qty: 90 | Refills: 3 | Status: ACTIVE | COMMUNITY
Start: 2021-12-20 | End: 1900-01-01

## 2024-03-25 ENCOUNTER — APPOINTMENT (OUTPATIENT)
Dept: PODIATRY | Facility: CLINIC | Age: 59
End: 2024-03-25
Payer: MEDICARE

## 2024-03-25 VITALS
SYSTOLIC BLOOD PRESSURE: 163 MMHG | HEART RATE: 71 BPM | TEMPERATURE: 97.9 F | WEIGHT: 315 LBS | BODY MASS INDEX: 41.75 KG/M2 | OXYGEN SATURATION: 94 % | HEIGHT: 73 IN | DIASTOLIC BLOOD PRESSURE: 71 MMHG

## 2024-03-25 DIAGNOSIS — B35.1 TINEA UNGUIUM: ICD-10-CM

## 2024-03-25 DIAGNOSIS — E11.610 TYPE 2 DIABETES MELLITUS WITH DIABETIC NEUROPATHIC ARTHROPATHY: ICD-10-CM

## 2024-03-25 DIAGNOSIS — Z89.422 ACQUIRED ABSENCE OF OTHER LEFT TOE(S): ICD-10-CM

## 2024-03-25 DIAGNOSIS — R26.89 OTHER ABNORMALITIES OF GAIT AND MOBILITY: ICD-10-CM

## 2024-03-25 DIAGNOSIS — E11.42 TYPE 2 DIABETES MELLITUS WITH DIABETIC POLYNEUROPATHY: ICD-10-CM

## 2024-03-25 DIAGNOSIS — Z89.421 ACQUIRED ABSENCE OF OTHER RIGHT TOE(S): ICD-10-CM

## 2024-03-25 PROCEDURE — 99213 OFFICE O/P EST LOW 20 MIN: CPT | Mod: 25

## 2024-03-25 PROCEDURE — 11056 PARNG/CUTG B9 HYPRKR LES 2-4: CPT

## 2024-03-25 PROCEDURE — 11720 DEBRIDE NAIL 1-5: CPT | Mod: 59

## 2024-03-29 ENCOUNTER — APPOINTMENT (OUTPATIENT)
Dept: PODIATRY | Facility: CLINIC | Age: 59
End: 2024-03-29
Payer: MEDICARE

## 2024-03-29 VITALS
WEIGHT: 315 LBS | TEMPERATURE: 97.2 F | BODY MASS INDEX: 41.75 KG/M2 | HEIGHT: 73 IN | DIASTOLIC BLOOD PRESSURE: 72 MMHG | OXYGEN SATURATION: 93 % | HEART RATE: 65 BPM | SYSTOLIC BLOOD PRESSURE: 154 MMHG

## 2024-03-29 DIAGNOSIS — L97.412 NON-PRESSURE CHRONIC ULCER OF RIGHT HEEL AND MIDFOOT WITH FAT LAYER EXPOSED: ICD-10-CM

## 2024-03-29 PROCEDURE — 99213 OFFICE O/P EST LOW 20 MIN: CPT

## 2024-03-31 NOTE — BRIEF OPERATIVE NOTE - DISPOSITION
Chief complaint:   Chief Complaint   Patient presents with   • Physical       Vitals:  Visit Vitals  /68 (BP Location: LUE - Left upper extremity, Patient Position: Sitting, Cuff Size: Regular)   Pulse (!) 60   Ht 5' 5\" (1.651 m)   Wt 73.5 kg (162 lb)   SpO2 98%   BMI 26.96 kg/m²       HISTORY OF PRESENT ILLNESS     51 year old female presents for yearly follow up.  No complaints today. Remains on Eliquis indefinitely.     She is . 2 adult children. She is a teacher. Doesn't smoke or use illegal drugs.  1-2 alcoholic beverages a week.  Amenorrheic with Mirena.       PAST MEDICAL, FAMILY AND SOCIAL HISTORY     Medications:  Current Outpatient Medications   Medication Sig Dispense Refill   • apixaBAN (Eliquis) 5 MG Tab Take 1 tablet by mouth every 12 hours. 180 tablet 3   • Aspirin-Acetaminophen-Caffeine (EXCEDRIN PO)      • Multiple Vitamins-Minerals (vitamin - therapeutic multivitamins w/minerals) tablet Take 1 tablet by mouth daily.       Current Facility-Administered Medications   Medication Dose Route Frequency Provider Last Rate Last Admin   • levonorgestrel (MIRENA) (52 MG) 20 MCG/DAY intrauterine device 52 mg  52 mg Intrauterine Every 7 Years Elida Gaitan MD   52 mg at 01/03/24 1979       Allergies:  ALLERGIES:   Allergen Reactions   • Kiwi   (Food Or Med) ANAPHYLAXIS       Past Medical  History/Surgeries:  Past Medical History:   Diagnosis Date   • Acute thromboembolism of deep veins of right lower extremity (CMD) 09/07/2021   • Blood clot associated with vein wall inflammation     right leg dvt and PE 8/31/21   • Encounter for insertion of mirena IUD 01/03/2024   • IUD (intrauterine device) in place    • Migraines     ocular and regular   • Saddle embolus of pulmonary artery without acute cor pulmonale (CMD) 08/31/2021       Past Surgical History:   Procedure Laterality Date   • Intra contr dev, mirena  01/03/2024       Family History:  Family History   Adopted: Yes   Family history  unknown: Yes       Social History:  Social History     Tobacco Use   • Smoking status: Never   • Smokeless tobacco: Never   Substance Use Topics   • Alcohol use: Yes     Alcohol/week: 2.0 - 3.0 standard drinks of alcohol     Types: 2 - 3 Standard drinks or equivalent per week       REVIEW OF SYSTEMS     Review of Systems    PHYSICAL EXAM     Physical Exam  Vitals reviewed.   Constitutional:       General: She is not in acute distress.     Appearance: Normal appearance. She is well-developed.   HENT:      Head: Normocephalic and atraumatic.      Nose: Nose normal.      Mouth/Throat:      Mouth: Mucous membranes are moist.      Pharynx: Oropharynx is clear.      Neck: Neck supple.   Eyes:      General: Lids are normal.      Conjunctiva/sclera: Conjunctivae normal.      Pupils: Pupils are equal, round, and reactive to light.   Neck:      Thyroid: No thyromegaly.   Cardiovascular:      Rate and Rhythm: Normal rate and regular rhythm.      Heart sounds: Normal heart sounds. No murmur heard.  Pulmonary:      Effort: Pulmonary effort is normal. No respiratory distress.      Breath sounds: Normal breath sounds. No wheezing, rhonchi or rales.   Abdominal:      General: Bowel sounds are normal. There is no distension.      Palpations: Abdomen is soft. There is no hepatomegaly or splenomegaly.      Tenderness: There is no abdominal tenderness.   Musculoskeletal:      Right lower leg: No edema.      Left lower leg: No edema.   Lymphadenopathy:      Cervical: No cervical adenopathy.   Skin:     General: Skin is warm and dry.      Findings: No rash.   Neurological:      Mental Status: She is alert and oriented to person, place, and time.   Psychiatric:         Mood and Affect: Mood and affect normal.         Behavior: Behavior normal. Behavior is cooperative.         Thought Content: Thought content normal.         Judgment: Judgment normal.         ASSESSMENT/PLAN     51 year old female presents for yearly follow up.  She has  the followin. Hx of acute PE: followed by Hematology.  On Eliquis indefinitely.     Preventative: mammogram scheduled.  Pap per Gyne.  Pt will schedule colonoscopy      RTC 1 year or prn     Floor

## 2024-04-12 ENCOUNTER — APPOINTMENT (OUTPATIENT)
Dept: PODIATRY | Facility: CLINIC | Age: 59
End: 2024-04-12
Payer: MEDICARE

## 2024-04-12 VITALS
BODY MASS INDEX: 59.47 KG/M2 | HEART RATE: 71 BPM | TEMPERATURE: 97.4 F | HEIGHT: 61 IN | OXYGEN SATURATION: 96 % | WEIGHT: 315 LBS | SYSTOLIC BLOOD PRESSURE: 120 MMHG | DIASTOLIC BLOOD PRESSURE: 69 MMHG

## 2024-04-12 PROCEDURE — 99213 OFFICE O/P EST LOW 20 MIN: CPT

## 2024-04-15 ENCOUNTER — APPOINTMENT (OUTPATIENT)
Dept: PODIATRY | Facility: CLINIC | Age: 59
End: 2024-04-15
Payer: MEDICARE

## 2024-04-15 VITALS
DIASTOLIC BLOOD PRESSURE: 71 MMHG | WEIGHT: 315 LBS | BODY MASS INDEX: 59.47 KG/M2 | OXYGEN SATURATION: 94 % | HEIGHT: 61 IN | HEART RATE: 68 BPM | SYSTOLIC BLOOD PRESSURE: 127 MMHG | TEMPERATURE: 97.9 F

## 2024-04-15 PROCEDURE — 99213 OFFICE O/P EST LOW 20 MIN: CPT

## 2024-04-17 ENCOUNTER — OUTPATIENT (OUTPATIENT)
Dept: OUTPATIENT SERVICES | Facility: HOSPITAL | Age: 59
LOS: 1 days | Discharge: HOME | End: 2024-04-17
Payer: MEDICARE

## 2024-04-17 ENCOUNTER — APPOINTMENT (OUTPATIENT)
Dept: WOUND CARE | Facility: HOSPITAL | Age: 59
End: 2024-04-17
Payer: MEDICARE

## 2024-04-17 VITALS
SYSTOLIC BLOOD PRESSURE: 133 MMHG | RESPIRATION RATE: 18 BRPM | WEIGHT: 315 LBS | DIASTOLIC BLOOD PRESSURE: 75 MMHG | OXYGEN SATURATION: 91 % | HEIGHT: 61 IN | BODY MASS INDEX: 59.47 KG/M2 | HEART RATE: 67 BPM | TEMPERATURE: 98.5 F

## 2024-04-17 DIAGNOSIS — Z89.421 ACQUIRED ABSENCE OF OTHER RIGHT TOE(S): Chronic | ICD-10-CM

## 2024-04-17 DIAGNOSIS — N18.6 END STAGE RENAL DISEASE: Chronic | ICD-10-CM

## 2024-04-17 DIAGNOSIS — Z98.890 OTHER SPECIFIED POSTPROCEDURAL STATES: Chronic | ICD-10-CM

## 2024-04-17 DIAGNOSIS — S91.309D UNSPECIFIED OPEN WOUND, UNSPECIFIED FOOT, SUBSEQUENT ENCOUNTER: ICD-10-CM

## 2024-04-17 DIAGNOSIS — Z94.0 KIDNEY TRANSPLANT STATUS: Chronic | ICD-10-CM

## 2024-04-17 PROCEDURE — 99203 OFFICE O/P NEW LOW 30 MIN: CPT

## 2024-04-17 PROCEDURE — 99213 OFFICE O/P EST LOW 20 MIN: CPT

## 2024-04-17 PROCEDURE — G0463: CPT

## 2024-04-19 NOTE — ED PROVIDER NOTE - TOBACCO USE
Onset: Patient reports having back pain for a while.   Location / description: Mid to lower back.  Precipitating Factors: 3/15/2024 MRI revealed degenerative changes of the lumbar spine and moderate neuro foraminal stenosis of the cervical spine.   Pain Scale (1 - 10), 10 highest: 10/10  Associated Symptoms: Mobility difficulties and fever.   What improves/worsens symptoms: Patient reports taking Tylenol at 9:00 AM with no relief.   Symptom specific medications: N/A  LMP : Patient reports being on her menstrual cycle right now.   Are you pregnant or breast feeding: No  Recent Care: None    RN called patient to assess back pain per Thien OT request. Patient reports having mid-lower back pain for a while, current pain level is 10/10, and took only taken Tylenol at 9:00 AM with no relief. Patient also reports having a fever and mobility difficulties. Last bowel movement reported, today in the morning. 3/15/2024 MRI revealed degenerative changes of the lumbar spine and moderate neuro foraminal stenosis of the cervical spine.    Patient requested for a pain management referral. Patient was recommended for go to an ER for an in person evaluation. Patient agreed an stated she will have her  take her.     Thank you  Blanca Gudino RN     Never smoker

## 2024-04-21 DIAGNOSIS — Z87.81 PERSONAL HISTORY OF (HEALED) TRAUMATIC FRACTURE: ICD-10-CM

## 2024-04-21 DIAGNOSIS — Z79.899 OTHER LONG TERM (CURRENT) DRUG THERAPY: ICD-10-CM

## 2024-04-21 DIAGNOSIS — Z86.73 PERSONAL HISTORY OF TRANSIENT ISCHEMIC ATTACK (TIA), AND CEREBRAL INFARCTION WITHOUT RESIDUAL DEFICITS: ICD-10-CM

## 2024-04-21 DIAGNOSIS — Z86.718 PERSONAL HISTORY OF OTHER VENOUS THROMBOSIS AND EMBOLISM: ICD-10-CM

## 2024-04-21 DIAGNOSIS — L84 CORNS AND CALLOSITIES: ICD-10-CM

## 2024-04-21 DIAGNOSIS — Z80.3 FAMILY HISTORY OF MALIGNANT NEOPLASM OF BREAST: ICD-10-CM

## 2024-04-21 DIAGNOSIS — Z85.828 PERSONAL HISTORY OF OTHER MALIGNANT NEOPLASM OF SKIN: ICD-10-CM

## 2024-04-21 DIAGNOSIS — Z80.8 FAMILY HISTORY OF MALIGNANT NEOPLASM OF OTHER ORGANS OR SYSTEMS: ICD-10-CM

## 2024-04-21 DIAGNOSIS — G47.33 OBSTRUCTIVE SLEEP APNEA (ADULT) (PEDIATRIC): ICD-10-CM

## 2024-04-21 DIAGNOSIS — E11.610 TYPE 2 DIABETES MELLITUS WITH DIABETIC NEUROPATHIC ARTHROPATHY: ICD-10-CM

## 2024-04-21 DIAGNOSIS — Z79.4 LONG TERM (CURRENT) USE OF INSULIN: ICD-10-CM

## 2024-04-21 DIAGNOSIS — Z94.0 KIDNEY TRANSPLANT STATUS: ICD-10-CM

## 2024-04-21 DIAGNOSIS — Z83.3 FAMILY HISTORY OF DIABETES MELLITUS: ICD-10-CM

## 2024-04-21 DIAGNOSIS — L97.412 NON-PRESSURE CHRONIC ULCER OF RIGHT HEEL AND MIDFOOT WITH FAT LAYER EXPOSED: ICD-10-CM

## 2024-04-21 DIAGNOSIS — Z86.16 PERSONAL HISTORY OF COVID-19: ICD-10-CM

## 2024-04-21 DIAGNOSIS — E78.5 HYPERLIPIDEMIA, UNSPECIFIED: ICD-10-CM

## 2024-04-21 DIAGNOSIS — Z79.82 LONG TERM (CURRENT) USE OF ASPIRIN: ICD-10-CM

## 2024-04-21 DIAGNOSIS — E11.621 TYPE 2 DIABETES MELLITUS WITH FOOT ULCER: ICD-10-CM

## 2024-04-21 NOTE — ASSESSMENT
[Verbal] : Verbal [Demo] : Demo [Patient] : Patient [Good - alert, interested, motivated] : Good - alert, interested, motivated [Verbalizes knowledge/Understanding] : Verbalizes knowledge/understanding [Dressing changes] : dressing changes [Foot Care] : foot care [Skin Care] : skin care [Pressure relief] : pressure relief [Signs and symptoms of infection] : sign and symptoms of infection [Nutrition] : nutrition [How and When to Call] : how and when to call [Patient responsibility to plan of care] : patient responsibility to plan of care [Glycemic Control] : glycemic control [] : Yes [Stable] : stable [Home] : Home [Ambulatory] : Ambulatory [Not Applicable - Long Term Care/Home Health Agency] : Long Term Care/Home Health Agency: Not Applicable [Written] : Written [FreeTextEntry2] : Infection Prevention Wound care and Dressing changes Promote and Restore optimal skin integrity Nutrition and Wound Healing  Offloading and Pressure relief Protect and Guard wound site  Maintain acceptable levels of Pain Compliance R/T Elevation of Lower Extremities Glycemic  Control Compression Compliance [FreeTextEntry3] : Initial  Visit [FreeTextEntry4] : DPM assessed wound site - shaved the callused area.  No vascular studies ordered by DPM - palpable pulses. Patient plans to offload for a few weeks to assist with healing.  Pt. proficient with dressing changes and applying ACE wrap. Small amount of supplies given to patient for dressing changes. Task sent for same. Patient verbalized understanding of all discussed. Patient to return to [facility] in One Week

## 2024-04-21 NOTE — PHYSICAL EXAM
[4 x 4] : 4 x 4  [0] : left 0 [1+] : left 1+ [Varicose Veins Of Lower Extremities] : not present [] : not present [Petechiae] : no petechiae [Purpura] : no purpura  [Skin Ulcer] : ulcer [Skin Induration] : no induration [Alert] : alert [Oriented to Person] : oriented to person [Oriented to Place] : oriented to place [Oriented to Time] : oriented to time [Calm] : calm [de-identified] : adult WM, NAD, alert, Ox 3. [de-identified] : HTN, HLD [de-identified] : Diabetic Charcot right foot deformity status post right Achilles tendon lengthening, right posterior tibial tendon lengthening, right peroneus brevis tendon detachment and reattachment with exostectomy of the right fifth metatarsal base and plantar cuboid, and right fifth digit proximal phalanx base resection [de-identified] : Right plantar midfoot ulcer down to fat with no signs of infection.  [de-identified] : Diabetic neuropathy  [de-identified] : Callused area shaved by KYLE Campbell 4/17/24. [FreeTextEntry1] : Right Foot -  [FreeTextEntry2] : 0.4 [FreeTextEntry3] : 0.3 [FreeTextEntry4] : 0.2 [de-identified] : Emily [de-identified] : Callused [de-identified] : Circulation and Neuromuscular assessment done post application. [de-identified] : YOSVANY,  Betadine to periwound x1 [de-identified] : Mechanically cleansed with 0.9% Normal Saline Cloth Tape [de-identified] : Right Dorsalis Pedis +1 Left Dorsalis Pedis +1  Extremity Temperature: warm  to touch Extremity Color:  Normal  Capillary Refill:  Brisk at < 3 seconds  [TWNoteComboBox4] : Small [TWNoteComboBox5] : No [TWNoteComboBox6] : Pressure [de-identified] : No [de-identified] : Macerated [de-identified] : None [de-identified] : None [de-identified] : 100% [de-identified] : Ace wraps [de-identified] : No [de-identified] : 3x Weekly [de-identified] : Primary Dressing

## 2024-04-21 NOTE — REVIEW OF SYSTEMS
[Fever] : no fever [Chills] : no chills [Eye Pain] : no eye pain [Red Eyes] : eyes not red [Earache] : no earache [Nosebleeds] : no nosebleeds [Chest Pain] : no chest pain [Shortness Of Breath] : no shortness of breath [Cough] : no cough [Abdominal Pain] : no abdominal pain [Vomiting] : no vomiting [Diarrhea] : no diarrhea [Limb Pain] : no limb pain [Joint Swelling] : no joint swelling [Skin Lesions] : skin lesion [Skin Wound] : no skin wound [Anxiety] : no anxiety [Easy Bleeding] : no tendency for easy bleeding [FreeTextEntry5] : HTN,HLD [FreeTextEntry7] : 40.9 BMI , morbid obesity  [FreeTextEntry9] : Associated Right Diabetic Charcot foot status post right Achilles tendon lengthening, right posterior tibial tendon lengthening, right peroneus brevis tendon detachment and reattachment with exostectomy of the right fifth metatarsal base and plantar cuboid, and right fifth digit proximal phalanx base resection [FreeTextEntry8] : Kidney Transplant  [de-identified] : Right plantar midfoot ulcer down to fat;  right hallux, right 4th and 5th digits, left hallux, left 4th and 5th digits painful, thickened, elongated nails  [de-identified] : IDDM with neuropathy  [de-identified] : MARILUZ

## 2024-04-21 NOTE — HISTORY OF PRESENT ILLNESS
[FreeTextEntry1] : Patient is 58 year old male presenting for new wound to the plantar right midfoot down to fat. Patient denies any pain and has been applying dressings as advised.

## 2024-04-21 NOTE — PLAN
[FreeTextEntry1] : Patient examined and evaluated at this time. C/w local wound care and offloading.  RTC in one week.  Spent 20 minutes for patient care and medical decision making.

## 2024-04-24 ENCOUNTER — APPOINTMENT (OUTPATIENT)
Dept: WOUND CARE | Facility: HOSPITAL | Age: 59
End: 2024-04-24
Payer: MEDICARE

## 2024-04-24 ENCOUNTER — OUTPATIENT (OUTPATIENT)
Dept: OUTPATIENT SERVICES | Facility: HOSPITAL | Age: 59
LOS: 1 days | Discharge: HOME | End: 2024-04-24
Payer: MEDICARE

## 2024-04-24 VITALS
HEART RATE: 60 BPM | TEMPERATURE: 98.3 F | SYSTOLIC BLOOD PRESSURE: 115 MMHG | OXYGEN SATURATION: 95 % | RESPIRATION RATE: 16 BRPM | DIASTOLIC BLOOD PRESSURE: 70 MMHG | BODY MASS INDEX: 41.75 KG/M2 | WEIGHT: 315 LBS | HEIGHT: 73 IN

## 2024-04-24 DIAGNOSIS — Z89.421 ACQUIRED ABSENCE OF OTHER RIGHT TOE(S): Chronic | ICD-10-CM

## 2024-04-24 DIAGNOSIS — Z98.890 OTHER SPECIFIED POSTPROCEDURAL STATES: Chronic | ICD-10-CM

## 2024-04-24 DIAGNOSIS — N18.6 END STAGE RENAL DISEASE: Chronic | ICD-10-CM

## 2024-04-24 DIAGNOSIS — S91.309D UNSPECIFIED OPEN WOUND, UNSPECIFIED FOOT, SUBSEQUENT ENCOUNTER: ICD-10-CM

## 2024-04-24 DIAGNOSIS — Z94.0 KIDNEY TRANSPLANT STATUS: Chronic | ICD-10-CM

## 2024-04-24 PROCEDURE — 99213 OFFICE O/P EST LOW 20 MIN: CPT

## 2024-04-24 PROCEDURE — G0463: CPT

## 2024-04-24 NOTE — ASSESSMENT
[Verbal] : Verbal [Written] : Written [Patient] : Patient [Verbalizes knowledge/Understanding] : Verbalizes knowledge/understanding [Dressing changes] : dressing changes [Foot Care] : foot care [Skin Care] : skin care [Pressure relief] : pressure relief [Signs and symptoms of infection] : sign and symptoms of infection [Nutrition] : nutrition [How and When to Call] : how and when to call [Compression Therapy] : compression therapy [Patient responsibility to plan of care] : patient responsibility to plan of care [Stable] : stable [Home] : Home [Ambulatory] : Ambulatory [] : No [FreeTextEntry2] : 1. Maintain skin integrity. 2. Prevent infection. 3. Promote nutrition. [FreeTextEntry4] : Patient will follow up with Dr. Campbell in one week. Will elevate leg.

## 2024-04-24 NOTE — VITALS
[Pain related to present condition?] : The patient's  pain is not related to present condition. [] : No [de-identified] : 0/10

## 2024-04-24 NOTE — PHYSICAL EXAM
[4 x 4] : 4 x 4  [FreeTextEntry1] : plantar mid foot [FreeTextEntry2] : 0.4 [FreeTextEntry3] : 0.3 [FreeTextEntry4] : 0.2 [de-identified] : serosanguinous [de-identified] : callused [de-identified] : briana [de-identified] : Mechanically cleansed with normal saline by MD. Callous shaved by MD. Nai applied by MD. DSD and  medipore tape to secure. Ace wrap applied to right foot and leg. Patient expressed comfort after ace wrap application.Neurovascular status intact. [TWNoteComboBox1] : Right [TWNoteComboBox4] : Small [TWNoteComboBox5] : No [TWNoteComboBox6] : Pressure [de-identified] : No [de-identified] : Macerated [de-identified] : None [de-identified] : None [de-identified] : 100% [de-identified] : No [de-identified] : Ace wraps [de-identified] : Every other day [de-identified] : Primary Dressing

## 2024-04-28 DIAGNOSIS — Z89.421 ACQUIRED ABSENCE OF OTHER RIGHT TOE(S): ICD-10-CM

## 2024-04-28 DIAGNOSIS — E11.621 TYPE 2 DIABETES MELLITUS WITH FOOT ULCER: ICD-10-CM

## 2024-04-28 DIAGNOSIS — Z79.899 OTHER LONG TERM (CURRENT) DRUG THERAPY: ICD-10-CM

## 2024-04-28 DIAGNOSIS — E11.610 TYPE 2 DIABETES MELLITUS WITH DIABETIC NEUROPATHIC ARTHROPATHY: ICD-10-CM

## 2024-04-28 DIAGNOSIS — E78.5 HYPERLIPIDEMIA, UNSPECIFIED: ICD-10-CM

## 2024-04-28 DIAGNOSIS — Z94.0 KIDNEY TRANSPLANT STATUS: ICD-10-CM

## 2024-04-28 DIAGNOSIS — L97.412 NON-PRESSURE CHRONIC ULCER OF RIGHT HEEL AND MIDFOOT WITH FAT LAYER EXPOSED: ICD-10-CM

## 2024-04-28 DIAGNOSIS — Z85.828 PERSONAL HISTORY OF OTHER MALIGNANT NEOPLASM OF SKIN: ICD-10-CM

## 2024-04-28 DIAGNOSIS — Z79.4 LONG TERM (CURRENT) USE OF INSULIN: ICD-10-CM

## 2024-04-28 DIAGNOSIS — Z89.422 ACQUIRED ABSENCE OF OTHER LEFT TOE(S): ICD-10-CM

## 2024-04-28 DIAGNOSIS — Z86.718 PERSONAL HISTORY OF OTHER VENOUS THROMBOSIS AND EMBOLISM: ICD-10-CM

## 2024-04-28 DIAGNOSIS — Z80.3 FAMILY HISTORY OF MALIGNANT NEOPLASM OF BREAST: ICD-10-CM

## 2024-04-28 DIAGNOSIS — Z80.8 FAMILY HISTORY OF MALIGNANT NEOPLASM OF OTHER ORGANS OR SYSTEMS: ICD-10-CM

## 2024-04-28 DIAGNOSIS — Z83.3 FAMILY HISTORY OF DIABETES MELLITUS: ICD-10-CM

## 2024-04-28 DIAGNOSIS — Z86.73 PERSONAL HISTORY OF TRANSIENT ISCHEMIC ATTACK (TIA), AND CEREBRAL INFARCTION WITHOUT RESIDUAL DEFICITS: ICD-10-CM

## 2024-04-28 DIAGNOSIS — L84 CORNS AND CALLOSITIES: ICD-10-CM

## 2024-04-28 DIAGNOSIS — Z87.81 PERSONAL HISTORY OF (HEALED) TRAUMATIC FRACTURE: ICD-10-CM

## 2024-04-28 DIAGNOSIS — Z79.82 LONG TERM (CURRENT) USE OF ASPIRIN: ICD-10-CM

## 2024-04-28 DIAGNOSIS — Z86.16 PERSONAL HISTORY OF COVID-19: ICD-10-CM

## 2024-04-28 DIAGNOSIS — G47.33 OBSTRUCTIVE SLEEP APNEA (ADULT) (PEDIATRIC): ICD-10-CM

## 2024-05-01 ENCOUNTER — APPOINTMENT (OUTPATIENT)
Dept: WOUND CARE | Facility: HOSPITAL | Age: 59
End: 2024-05-01
Payer: MEDICARE

## 2024-05-01 ENCOUNTER — OUTPATIENT (OUTPATIENT)
Dept: OUTPATIENT SERVICES | Facility: HOSPITAL | Age: 59
LOS: 1 days | Discharge: HOME | End: 2024-05-01
Payer: MEDICARE

## 2024-05-01 DIAGNOSIS — Z89.421 ACQUIRED ABSENCE OF OTHER RIGHT TOE(S): Chronic | ICD-10-CM

## 2024-05-01 DIAGNOSIS — S91.309D UNSPECIFIED OPEN WOUND, UNSPECIFIED FOOT, SUBSEQUENT ENCOUNTER: ICD-10-CM

## 2024-05-01 DIAGNOSIS — Z98.890 OTHER SPECIFIED POSTPROCEDURAL STATES: Chronic | ICD-10-CM

## 2024-05-01 DIAGNOSIS — N18.6 END STAGE RENAL DISEASE: Chronic | ICD-10-CM

## 2024-05-01 DIAGNOSIS — Z94.0 KIDNEY TRANSPLANT STATUS: Chronic | ICD-10-CM

## 2024-05-01 PROCEDURE — 99213 OFFICE O/P EST LOW 20 MIN: CPT

## 2024-05-01 PROCEDURE — G0463: CPT

## 2024-05-01 NOTE — REVIEW OF SYSTEMS
[Skin Lesions] : skin lesion [Fever] : no fever [Chills] : no chills [Eye Pain] : no eye pain [Red Eyes] : eyes not red [Earache] : no earache [Nosebleeds] : no nosebleeds [Chest Pain] : no chest pain [Shortness Of Breath] : no shortness of breath [Cough] : no cough [Abdominal Pain] : no abdominal pain [Vomiting] : no vomiting [Diarrhea] : no diarrhea [Joint Swelling] : no joint swelling [Limb Pain] : no limb pain [Skin Wound] : no skin wound [Anxiety] : no anxiety [Easy Bleeding] : no tendency for easy bleeding [FreeTextEntry5] : HTN,HLD [FreeTextEntry7] : 40.9 BMI , morbid obesity  [FreeTextEntry8] : Kidney Transplant  [FreeTextEntry9] : Associated Right Diabetic Charcot foot status post right Achilles tendon lengthening, right posterior tibial tendon lengthening, right peroneus brevis tendon detachment and reattachment with exostectomy of the right fifth metatarsal base and plantar cuboid, and right fifth digit proximal phalanx base resection [de-identified] : Right plantar midfoot ulcer down to fat  [de-identified] : IDDM with neuropathy  [de-identified] : MARILUZ

## 2024-05-01 NOTE — PLAN
[FreeTextEntry1] : Patient examined and evaluated at this time. Continue with local wound care and offloading. Patient to obtain weightbearing radiographs of the right foot. RTC in one week.  Spent 20 minutes for patient care and medical decision making.

## 2024-05-01 NOTE — ED PROVIDER NOTE - CHIEF COMPLAINT
Rx Refill Note  Requested Prescriptions     Pending Prescriptions Disp Refills    pregabalin (Lyrica) 50 MG capsule 90 capsule 0     Sig: Take 1 capsule by mouth 3 (Three) Times a Day.      Last office visit with prescribing clinician: 4/9/2024   Last telemedicine visit with prescribing clinician: Visit date not found   Next office visit with prescribing clinician: 5/6/2024     LAST FILLED ON 3/5/24    POC Urine Drug Screen, Triage (05/17/2023 11:27)     CONSENTS/WAIVERS - SCAN - CONSENT FOR NARCOTICS/05/17/2023 (05/17/2023)       INSPECT - SCAN - [place] INSPECT REPORT FROM 5/1/24 (05/01/2024)       Amy Romero CMA  05/01/24, 10:47 EDT   The patient is a 53y Male complaining of hypoglycemia.

## 2024-05-01 NOTE — HISTORY OF PRESENT ILLNESS
[FreeTextEntry1] : Patient seen for follow-up for wound to the plantar right midfoot down to fat. Patient denies any pain and has been applying dressings as advised. Patient relates had to be weight bearing for an extended period of time since the patient's mother was unwell.  Denies any other complaints at this time.

## 2024-05-01 NOTE — ASSESSMENT
[Verbal] : Verbal [Demo] : Demo [Patient] : Patient [Good - alert, interested, motivated] : Good - alert, interested, motivated [Verbalizes knowledge/Understanding] : Verbalizes knowledge/understanding [Dressing changes] : dressing changes [Foot Care] : foot care [Skin Care] : skin care [Pressure relief] : pressure relief [Signs and symptoms of infection] : sign and symptoms of infection [Nutrition] : nutrition [How and When to Call] : how and when to call [Pain Management] : pain management [Off-loading] : off-loading [Patient responsibility to plan of care] : patient responsibility to plan of care [Glycemic Control] : glycemic control [Other: ____] : [unfilled] [Stable] : stable [Home] : Home [Ambulatory] : Ambulatory [Not Applicable - Long Term Care/Home Health Agency] : Long Term Care/Home Health Agency: Not Applicable [] : No [FreeTextEntry2] : Infection prevention Localized wound care Promote optimal skin integrity  Maintain acceptable level of pain with use of pharmacological and nonpharmacological interventions Offloading / Pressure relief  Daily foot care / monitoring  Collagen matrix  [FreeTextEntry4] : Follow up for an assessment in one week  Rx for X-ray was provided to the patient  Patient has someone at home who is able to perform wound care.  No additional supplies needed at this time.  [FreeTextEntry3] : increase in wound size

## 2024-05-01 NOTE — PHYSICAL EXAM
[2 x 2] : 2 x 2  [0] : left 0 [1+] : left 1+ [Skin Ulcer] : ulcer [Alert] : alert [Oriented to Person] : oriented to person [Oriented to Place] : oriented to place [Oriented to Time] : oriented to time [Calm] : calm [Varicose Veins Of Lower Extremities] : not present [] : not present [Purpura] : no purpura  [Petechiae] : no petechiae [Skin Induration] : no induration [de-identified] : adult WM, NAD, alert, Ox 3. [de-identified] : HTN, HLD [de-identified] : Diabetic Charcot right foot deformity status post right Achilles tendon lengthening, right posterior tibial tendon lengthening, right peroneus brevis tendon detachment and reattachment with exostectomy of the right fifth metatarsal base and plantar cuboid, and right fifth digit proximal phalanx base resection [de-identified] : Right plantar midfoot ulcer down to fat with no signs of infection.  [de-identified] : Diabetic neuropathy  [FreeTextEntry1] : plantar mid foot [FreeTextEntry2] : 0.4 [FreeTextEntry3] : 1.0 [FreeTextEntry4] : 0.3 [de-identified] : serosanguineous  [de-identified] : callused [de-identified] : Nai  [de-identified] : Cleansed with 0.9% Normal Saline  Cloth tape  [TWNoteComboBox1] : Right [TWNoteComboBox4] : Small [TWNoteComboBox5] : No [TWNoteComboBox6] : Pressure [de-identified] : No [de-identified] : Macerated [de-identified] : None [de-identified] : None [de-identified] : 100% [de-identified] : No [de-identified] : Ace wraps [de-identified] : Every other day [de-identified] : Primary Dressing

## 2024-05-02 ENCOUNTER — OUTPATIENT (OUTPATIENT)
Dept: OUTPATIENT SERVICES | Facility: HOSPITAL | Age: 59
LOS: 1 days | End: 2024-05-02
Payer: MEDICARE

## 2024-05-02 DIAGNOSIS — Z80.3 FAMILY HISTORY OF MALIGNANT NEOPLASM OF BREAST: ICD-10-CM

## 2024-05-02 DIAGNOSIS — E11.621 TYPE 2 DIABETES MELLITUS WITH FOOT ULCER: ICD-10-CM

## 2024-05-02 DIAGNOSIS — Z85.828 PERSONAL HISTORY OF OTHER MALIGNANT NEOPLASM OF SKIN: ICD-10-CM

## 2024-05-02 DIAGNOSIS — E11.610 TYPE 2 DIABETES MELLITUS WITH DIABETIC NEUROPATHIC ARTHROPATHY: ICD-10-CM

## 2024-05-02 DIAGNOSIS — G47.33 OBSTRUCTIVE SLEEP APNEA (ADULT) (PEDIATRIC): ICD-10-CM

## 2024-05-02 DIAGNOSIS — Z98.890 OTHER SPECIFIED POSTPROCEDURAL STATES: Chronic | ICD-10-CM

## 2024-05-02 DIAGNOSIS — Z83.3 FAMILY HISTORY OF DIABETES MELLITUS: ICD-10-CM

## 2024-05-02 DIAGNOSIS — Z79.4 LONG TERM (CURRENT) USE OF INSULIN: ICD-10-CM

## 2024-05-02 DIAGNOSIS — Z86.73 PERSONAL HISTORY OF TRANSIENT ISCHEMIC ATTACK (TIA), AND CEREBRAL INFARCTION WITHOUT RESIDUAL DEFICITS: ICD-10-CM

## 2024-05-02 DIAGNOSIS — Z79.82 LONG TERM (CURRENT) USE OF ASPIRIN: ICD-10-CM

## 2024-05-02 DIAGNOSIS — Z89.422 ACQUIRED ABSENCE OF OTHER LEFT TOE(S): ICD-10-CM

## 2024-05-02 DIAGNOSIS — Z89.421 ACQUIRED ABSENCE OF OTHER RIGHT TOE(S): ICD-10-CM

## 2024-05-02 DIAGNOSIS — Z89.421 ACQUIRED ABSENCE OF OTHER RIGHT TOE(S): Chronic | ICD-10-CM

## 2024-05-02 DIAGNOSIS — Z94.0 KIDNEY TRANSPLANT STATUS: ICD-10-CM

## 2024-05-02 DIAGNOSIS — Z87.81 PERSONAL HISTORY OF (HEALED) TRAUMATIC FRACTURE: ICD-10-CM

## 2024-05-02 DIAGNOSIS — L84 CORNS AND CALLOSITIES: ICD-10-CM

## 2024-05-02 DIAGNOSIS — N18.6 END STAGE RENAL DISEASE: Chronic | ICD-10-CM

## 2024-05-02 DIAGNOSIS — Z79.899 OTHER LONG TERM (CURRENT) DRUG THERAPY: ICD-10-CM

## 2024-05-02 DIAGNOSIS — E78.5 HYPERLIPIDEMIA, UNSPECIFIED: ICD-10-CM

## 2024-05-02 DIAGNOSIS — Z86.16 PERSONAL HISTORY OF COVID-19: ICD-10-CM

## 2024-05-02 DIAGNOSIS — Z86.718 PERSONAL HISTORY OF OTHER VENOUS THROMBOSIS AND EMBOLISM: ICD-10-CM

## 2024-05-02 DIAGNOSIS — Z94.0 KIDNEY TRANSPLANT STATUS: Chronic | ICD-10-CM

## 2024-05-02 DIAGNOSIS — L97.412 NON-PRESSURE CHRONIC ULCER OF RIGHT HEEL AND MIDFOOT WITH FAT LAYER EXPOSED: ICD-10-CM

## 2024-05-02 DIAGNOSIS — Z80.8 FAMILY HISTORY OF MALIGNANT NEOPLASM OF OTHER ORGANS OR SYSTEMS: ICD-10-CM

## 2024-05-02 PROCEDURE — 73630 X-RAY EXAM OF FOOT: CPT | Mod: 26,RT

## 2024-05-02 PROCEDURE — 73630 X-RAY EXAM OF FOOT: CPT

## 2024-05-06 PROBLEM — Z89.421 ABSENCE OF TOE OF RIGHT FOOT: Status: ACTIVE | Noted: 2020-09-17

## 2024-05-06 PROBLEM — Z89.422 ACQUIRED ABSENCE OF OTHER LEFT TOE(S): Status: ACTIVE | Noted: 2019-12-31

## 2024-05-06 PROBLEM — L97.412 CHRONIC ULCER OF RIGHT HEEL WITH FAT LAYER EXPOSED: Status: ACTIVE | Noted: 2022-08-08

## 2024-05-06 PROBLEM — B35.1 ONYCHOMYCOSIS OF TOENAIL: Status: ACTIVE | Noted: 2024-02-27

## 2024-05-06 PROBLEM — E11.610 DIABETIC CHARCOT FOOT: Status: ACTIVE | Noted: 2022-12-01

## 2024-05-06 NOTE — H&P ADULT. - DOCUMENT STATUS
Date of Service, 05-06-24 @ 21:48  CHIEF COMPLAINT:Patient is a 88y old  Female who presents with a chief complaint of     HPI:  88-year-old female without any prevalent significant past medical condition chief complaint of shortness of breath ongoing the past couple of days or so.  Patient noted to be hypoxic by EMS to 88%.  Patient does not wear any home oxygen at home.  Patient present with aide who also has paperwork showing she is DNR DNI/with the [facility] form.    PAST MEDICAL & SURGICAL HISTORY:  No pertinent past medical history      No significant past surgical history      MEDICATIONS  (STANDING):  vancomycin  IVPB. 1000 milliGRAM(s) IV Intermittent once    MEDICATIONS  (PRN):      FAMILY HISTORY:      SOCIAL HISTORY:    [ x] Non-smoker  [ ] Smoker  [ ] Alcohol    Allergies    No Known Allergies    Intolerances    	    REVIEW OF SYSTEMS:  CONSTITUTIONAL: No fever, weight loss, or fatigue  EYES: No eye pain, visual disturbances, or discharge  ENT:  No difficulty hearing, tinnitus, vertigo; No sinus or throat pain  NECK: No pain or stiffness  RESPIRATORY: No cough, wheezing, chills or hemoptysis; + Shortness of Breath  CARDIOVASCULAR: No chest pain, palpitations, passing out, dizziness, or leg swelling  GASTROINTESTINAL: No abdominal or epigastric pain. No nausea, vomiting, or hematemesis; No diarrhea or constipation. No melena or hematochezia.  GENITOURINARY: No dysuria, frequency, hematuria, or incontinence  NEUROLOGICAL: No headaches, memory loss, loss of strength, numbness, or tremors  SKIN: No itching, burning, rashes, or lesions   LYMPH Nodes: No enlarged glands  ENDOCRINE: No heat or cold intolerance; No hair loss  MUSCULOSKELETAL: No joint pain or swelling; No muscle, back, or extremity pain  PSYCHIATRIC: No depression, anxiety, mood swings, or difficulty sleeping  HEME/LYMPH: No easy bruising, or bleeding gums  ALLERGY AND IMMUNOLOGIC: No hives or eczema	    [x ] All others negative	  [ ] Unable to obtain    PHYSICAL EXAM:  T(C): 37.9 (05-06-24 @ 20:00), Max: 37.9 (05-06-24 @ 20:00)  HR: 107 (05-06-24 @ 20:00) (100 - 107)  BP: 130/77 (05-06-24 @ 20:00) (130/77 - 145/94)  RR: 26 (05-06-24 @ 20:00) (26 - 26)  SpO2: 96% (05-06-24 @ 20:00) (90% - 96%)  Wt(kg): --  I&O's Summary      Appearance: Normal	  HEENT:   Normal oral mucosa, PERRL, EOMI	  Lymphatic: No lymphadenopathy  Cardiovascular: Normal S1 S2, No JVD, +murmurs, No edema  Respiratory: rhonchi  Gastrointestinal:  Soft, Non-tender, + BS	  Skin: No rashes, No ecchymoses, No cyanosis	  Extremities: Normal range of motion, No clubbing, cyanosis or edema  Vascular: Peripheral pulses palpable 2+ bilaterally    TELEMETRY: 	    ECG:  	  RADIOLOGY:  OTHER: 	  	  LABS:	 	    CARDIAC MARKERS:                              12.8   10.75 )-----------( 272      ( 06 May 2024 20:31 )             39.3     05-06    134<L>  |  96  |  18  ----------------------------<  143<H>  4.4   |  26  |  1.11    Ca    9.0      06 May 2024 20:31    TPro  7.3  /  Alb  3.8  /  TBili  0.3  /  DBili  x   /  AST  20  /  ALT  10  /  AlkPhos  101  05-06    proBNP:   Lipid Profile:   HgA1c:   TSH:   PT/INR - ( 06 May 2024 20:31 )   PT: 12.1 sec;   INR: 1.16 ratio         PTT - ( 06 May 2024 20:31 )  PTT:28.7 sec    PREVIOUS DIAGNOSTIC TESTING:      < from: 12 Lead ECG (12.27.21 @ 16:04) >  Diagnosis Line NORMAL SINUS RHYTHM  LEFT AXIS DEVIATION  VOLTAGE CRITERIA FOR LEFT VENTRICULAR HYPERTROPHY  ABNORMAL ECG  NO PREVIOUS ECGS AVAILABLE    < from: Xray Chest 1 View- PORTABLE-Urgent (05.06.24 @ 20:51) >  FINDINGS:  Heart/Vascular: Heart size is poorly assessed on this projection  Pulmonary: The lungs areclear. No pleural effusion. No pneumothorax.  Bones: No acute osseous abnormalities. Stable right rib fractures.    IMPRESSION:  Clear lungs.             Authored by Attending

## 2024-05-06 NOTE — HISTORY OF PRESENT ILLNESS
[FreeTextEntry1] : 58 year old male presents complaining of painful, thickened, elongated nails of b/l hallux, 4th and 5th digits. Patient relates that he experiences worsened pain when walking and moving around with shoes. Patient states that he has been applying Ciclopirox to the nails on a daily basis as instructed in the last visit. Patient does not recall any recent trauma to the nails. Patient adds that he has been experiencing increased swelling at the right foot. Patient has history of kidney transplant. Patient mentions that he has been regularly consulting his cardiologist, endocrinologist, and nephrologist.  Patient also presents for follow up complaining of painful lesions to both feet. Patient conveys that he has tried to treat them at home with daily application of Bag Balm.   HgA1c - 7.1% Most recent BG - 122 mg/dL (as per most recent chart update on 12/11/23) Recent PCP visit - Patient does not recall Numbness, tingling or burning - Experienced in neither foot  PO or insulin - BD insulin

## 2024-05-06 NOTE — REVIEW OF SYSTEMS
[Skin Lesions] : skin lesion [Fever] : no fever [Chills] : no chills [Eye Pain] : no eye pain [Red Eyes] : eyes not red [Earache] : no earache [Nosebleeds] : no nosebleeds [Chest Pain] : no chest pain [Shortness Of Breath] : no shortness of breath [Cough] : no cough [Abdominal Pain] : no abdominal pain [Vomiting] : no vomiting [Diarrhea] : no diarrhea [Joint Swelling] : no joint swelling [Limb Pain] : no limb pain [Skin Wound] : no skin wound [Anxiety] : no anxiety [Easy Bleeding] : no tendency for easy bleeding [FreeTextEntry5] : HTN,HLD [FreeTextEntry7] : 40.9 BMI , morbid obesity  [FreeTextEntry8] : Kidney Transplant  [FreeTextEntry9] : Associated Right Diabetic Charcot foot status post right Achilles tendon lengthening, right posterior tibial tendon lengthening, right peroneus brevis tendon detachment and reattachment with exostectomy of the right fifth metatarsal base and plantar cuboid, and right fifth digit proximal phalanx base resection [de-identified] : HPK lesion to the plantar right foot sub 3rd metatarsal base, left foot distal 5th digit, and medial-distal hallux b/l; Pre ulcerative lesion to the right plantar midfoot; thickened and elongated nails of b/l hallux, 4th and 5th digits [de-identified] : IDDM with neuropathy  [de-identified] : MARILUZ

## 2024-05-06 NOTE — ASSESSMENT
[FreeTextEntry1] : .Patient seen and evaluated. Discussed etiology and treatment plan. Patient verbalized understanding. Will start with local wound care and offlaoding. No soi. RTC in one week. Spent 20 minutes for patient care and medical decision making.

## 2024-05-06 NOTE — HISTORY OF PRESENT ILLNESS
[FreeTextEntry1] : Patient is 58 year old male presenting for f/u for new wound to the plantar right midfoot. Patient relates he ahs been gardening for an extended period of time and noticed bleeding from under the right foot and was concerned. Denies any trauma to the foot.

## 2024-05-06 NOTE — PLAN
[FreeTextEntry1] : Patient examined and evaluated at this time. C/w local wound care and offloading. Discussed with patient if no progress of healing will obtain X- rays of the right foot to assess for OM vs any osseous pathology.  RTC in one week.  Spent 20 minutes for patient care and medical decision making.

## 2024-05-06 NOTE — ASSESSMENT
[FreeTextEntry1] : Patient seen and evaluated. Discussed etiology and treatment plan. Patient verbalized understanding. Discussed etiology and patient advised regarding possible etiology of symptoms. Debrided nails of b/l hallux, 5th digits and left 3rd digt using sterile nail nippers and aseptic technique without incident. The offending nail margins were mechanically and electrically debrided to the level of normal underlying nail bed with good relief obtained as evidenced by pain-free ambulation.  The patient was instructed to attempt to maintain the length and thickness of their nails as best as possible. Discussed with patient to wear supportive shoe gear with wide toed shoes and extra depth in toe box.  Debrided the lesions x 5 full thickness, recommended use of Vaseline or similar product to decrease friction, stretched shoes, instructed patient to use appropriate shoegear. Cauterized the pre-ulcerative lesion with silver nitrate. Discussed with patient to perform daily foot checks and monitor for any new lesions, open wounds or calluses and to return to clinic at the earliest. Patient has been advised to continue using Aquaphor at home to moisturize his feet on a daily basis.  Advised patient to continue applying Ciclopirox to the affected nails on a daily basis.  Patient will benefit from custom foot orthotics for arch support and to stabilize and assist with gait. Patient agreed to conservative treatment. Patient given referral for orthotist. Patient is high risk for developing infection from pressure calluses and patient has history of amputations.  RTC in 9 weeks for follow up to ensure that skin is not breaking down.  Spent 20 minutes for patient care and medical decision making.

## 2024-05-06 NOTE — PHYSICAL EXAM
[0] : left 0 [1+] : left 1+ [Varicose Veins Of Lower Extremities] : not present [] : not present [Purpura] : no purpura  [Petechiae] : no petechiae [Skin Ulcer] : ulcer [Skin Induration] : no induration [Alert] : alert [Oriented to Person] : oriented to person [Oriented to Place] : oriented to place [Oriented to Time] : oriented to time [Calm] : calm [de-identified] : adult WM, NAD, alert, Ox 3. [de-identified] : HTN, HLD [de-identified] : Diabetic Charcot right foot deformity status post right Achilles tendon lengthening, right posterior tibial tendon lengthening, right peroneus brevis tendon detachment and reattachment with exostectomy of the right fifth metatarsal base and plantar cuboid, and right fifth digit proximal phalanx base resection [de-identified] : Right plantar midfoot ulcer down to fat with no signs of infection.  [de-identified] : Diabetic neuropathy

## 2024-05-06 NOTE — PHYSICAL EXAM
[General Appearance - Alert] : alert [General Appearance - In No Acute Distress] : in no acute distress [2+] : left foot dorsalis pedis 2+ [Oriented To Time, Place, And Person] : oriented to person, place, and time [Affect] : the affect was normal [Ankle Swelling (On Exam)] : not present [Varicose Veins Of Lower Extremities] : not present [] : not present [de-identified] : 4/5 strength in all quadrants bilaterally, s/p right posterior tibial tendon lengthening, achilles tendon lengthening, 5th metatarsal base exostectomy with peroneus longus tendon detachment and reattachment, s/p bilateral 2nd and 3rd digit distal amputations. [FreeTextEntry1] : Loss of light touch sensation bilaterally

## 2024-05-06 NOTE — REASON FOR VISIT
[Other:___] : [unfilled] [Follow-Up Visit] : a follow-up visit for [FreeTextEntry2] : PAtient is being seen for R foot bleeding callus

## 2024-05-06 NOTE — REVIEW OF SYSTEMS
[Fever] : no fever [Chills] : no chills [Eye Pain] : no eye pain [Red Eyes] : eyes not red [Earache] : no earache [Nosebleeds] : no nosebleeds [Chest Pain] : no chest pain [Shortness Of Breath] : no shortness of breath [Cough] : no cough [Abdominal Pain] : no abdominal pain [Vomiting] : no vomiting [Diarrhea] : no diarrhea [Joint Swelling] : no joint swelling [Limb Pain] : no limb pain [Skin Lesions] : skin lesion [Skin Wound] : no skin wound [Anxiety] : no anxiety [Easy Bleeding] : no tendency for easy bleeding [FreeTextEntry5] : HTN,HLD [FreeTextEntry7] : 40.9 BMI , morbid obesity  [FreeTextEntry9] : Associated Right Diabetic Charcot foot status post right Achilles tendon lengthening, right posterior tibial tendon lengthening, right peroneus brevis tendon detachment and reattachment with exostectomy of the right fifth metatarsal base and plantar cuboid, and right fifth digit proximal phalanx base resection [FreeTextEntry8] : Kidney Transplant  [de-identified] : Right plantar midfoot ulcer down to fat  [de-identified] : IDDM with neuropathy  [de-identified] : MARILUZ

## 2024-05-08 ENCOUNTER — OUTPATIENT (OUTPATIENT)
Dept: OUTPATIENT SERVICES | Facility: HOSPITAL | Age: 59
LOS: 1 days | Discharge: HOME | End: 2024-05-08
Payer: MEDICARE

## 2024-05-08 ENCOUNTER — APPOINTMENT (OUTPATIENT)
Dept: WOUND CARE | Facility: HOSPITAL | Age: 59
End: 2024-05-08
Payer: MEDICARE

## 2024-05-08 VITALS
SYSTOLIC BLOOD PRESSURE: 154 MMHG | OXYGEN SATURATION: 95 % | WEIGHT: 315 LBS | HEART RATE: 61 BPM | HEIGHT: 73 IN | TEMPERATURE: 98.5 F | RESPIRATION RATE: 18 BRPM | DIASTOLIC BLOOD PRESSURE: 76 MMHG | BODY MASS INDEX: 41.75 KG/M2

## 2024-05-08 DIAGNOSIS — N18.6 END STAGE RENAL DISEASE: Chronic | ICD-10-CM

## 2024-05-08 DIAGNOSIS — Z94.0 KIDNEY TRANSPLANT STATUS: Chronic | ICD-10-CM

## 2024-05-08 DIAGNOSIS — Z89.421 ACQUIRED ABSENCE OF OTHER RIGHT TOE(S): Chronic | ICD-10-CM

## 2024-05-08 DIAGNOSIS — S91.309D UNSPECIFIED OPEN WOUND, UNSPECIFIED FOOT, SUBSEQUENT ENCOUNTER: ICD-10-CM

## 2024-05-08 DIAGNOSIS — Z98.890 OTHER SPECIFIED POSTPROCEDURAL STATES: Chronic | ICD-10-CM

## 2024-05-08 PROCEDURE — 99213 OFFICE O/P EST LOW 20 MIN: CPT

## 2024-05-08 PROCEDURE — G0463: CPT

## 2024-05-08 NOTE — REVIEW OF SYSTEMS
[Fever] : no fever [Chills] : no chills [Eye Pain] : no eye pain [Red Eyes] : eyes not red [Earache] : no earache [Nosebleeds] : no nosebleeds [Chest Pain] : no chest pain [Shortness Of Breath] : no shortness of breath [Cough] : no cough [Abdominal Pain] : no abdominal pain [Vomiting] : no vomiting [Diarrhea] : no diarrhea [Joint Swelling] : no joint swelling [Limb Pain] : no limb pain [Skin Lesions] : skin lesion [Skin Wound] : no skin wound [Anxiety] : no anxiety [Easy Bleeding] : no tendency for easy bleeding [FreeTextEntry5] : HTN,HLD [FreeTextEntry7] : 40.9 BMI , morbid obesity  [FreeTextEntry8] : Kidney Transplant  [FreeTextEntry9] : Associated Right Diabetic Charcot foot status post right Achilles tendon lengthening, right posterior tibial tendon lengthening, right peroneus brevis tendon detachment and reattachment with exostectomy of the right fifth metatarsal base and plantar cuboid, and right fifth digit proximal phalanx base resection [de-identified] : Right plantar midfoot ulcer down to fat  [de-identified] : IDDM with neuropathy  [de-identified] : MARILUZ

## 2024-05-08 NOTE — PLAN
[FreeTextEntry1] : Patient examined and evaluated at this time. Continue with local wound care and offloading. Patient advised regarding the need for next ostectomy of the right foot to remove the bony prominence.  All questions answered to satisfaction and patient verbalized understanding.  Discussed the risk, benefits, and potential complications with the patient to his satisfaction. Will plan for surgical intervention. Spent 20 minutes for patient care and medical decision making.

## 2024-05-08 NOTE — HISTORY OF PRESENT ILLNESS
[FreeTextEntry1] : Patient seen for follow-up for wound to the plantar right midfoot down to fat. Patient denies any pain and has been applying dressings as advised. Patient relates had to be weight bearing for an extended period of time since the patient's mother was unwell.  Denies any other complaints at this time.  5/8/24 patient seen for follow-up for right plantar lateral midfoot wound down to skin, subcutaneous tissue, fat.  Patient relates that he was able to obtain an x-ray as advised and notes that he has been changing the dressings as advised since the last encounter.  Denies any other complaints at this time.

## 2024-05-08 NOTE — PHYSICAL EXAM
[2 x 2] : 2 x 2  [0] : left 0 [1+] : left 1+ [Varicose Veins Of Lower Extremities] : not present [Purpura] : no purpura  [] : not present [Petechiae] : no petechiae [Skin Ulcer] : ulcer [Skin Induration] : no induration [Alert] : alert [Oriented to Person] : oriented to person [Oriented to Place] : oriented to place [Oriented to Time] : oriented to time [Calm] : calm [de-identified] : HTN, HLD [de-identified] : A&Ox3, NAD [de-identified] : Diabetic Charcot right foot deformity status post right Achilles tendon lengthening, right posterior tibial tendon lengthening, right peroneus brevis tendon detachment and reattachment with exostectomy of the right fifth metatarsal base and plantar cuboid, and right fifth digit proximal phalanx base resection [de-identified] : Right plantar midfoot ulcer down to fat with no signs of infection.  [de-identified] : Diabetic neuropathy  [de-identified] : DPM shaved surrounding callous  [FreeTextEntry1] : Plantar Mid foot [FreeTextEntry2] : 0.4 [FreeTextEntry4] : 0.3 [FreeTextEntry3] : 1.0 [de-identified] : serosanguineous  [de-identified] : callused [de-identified] : Patient expressed comport post application, Circ/Neuromuscular function WNL [de-identified] : Silver Alginate [de-identified] :  Mechanically cleansed with NS 0.9%, Sterile Gauze Cloth tape  [TWNoteComboBox1] : Right [TWNoteComboBox4] : Small [TWNoteComboBox5] : No [TWNoteComboBox6] : Pressure [de-identified] : No [de-identified] : Macerated [de-identified] : None [de-identified] : None [de-identified] : 100% [de-identified] : No [de-identified] : Ace wraps [de-identified] : Every other day [de-identified] : Primary Dressing

## 2024-05-11 DIAGNOSIS — E11.621 TYPE 2 DIABETES MELLITUS WITH FOOT ULCER: ICD-10-CM

## 2024-05-11 DIAGNOSIS — E11.610 TYPE 2 DIABETES MELLITUS WITH DIABETIC NEUROPATHIC ARTHROPATHY: ICD-10-CM

## 2024-05-11 DIAGNOSIS — L97.412 NON-PRESSURE CHRONIC ULCER OF RIGHT HEEL AND MIDFOOT WITH FAT LAYER EXPOSED: ICD-10-CM

## 2024-05-11 DIAGNOSIS — Z86.16 PERSONAL HISTORY OF COVID-19: ICD-10-CM

## 2024-05-11 DIAGNOSIS — Z87.81 PERSONAL HISTORY OF (HEALED) TRAUMATIC FRACTURE: ICD-10-CM

## 2024-05-11 DIAGNOSIS — Z89.421 ACQUIRED ABSENCE OF OTHER RIGHT TOE(S): ICD-10-CM

## 2024-05-11 DIAGNOSIS — E78.5 HYPERLIPIDEMIA, UNSPECIFIED: ICD-10-CM

## 2024-05-11 DIAGNOSIS — Z89.422 ACQUIRED ABSENCE OF OTHER LEFT TOE(S): ICD-10-CM

## 2024-05-11 DIAGNOSIS — Z83.3 FAMILY HISTORY OF DIABETES MELLITUS: ICD-10-CM

## 2024-05-11 DIAGNOSIS — Z86.718 PERSONAL HISTORY OF OTHER VENOUS THROMBOSIS AND EMBOLISM: ICD-10-CM

## 2024-05-11 DIAGNOSIS — Z80.3 FAMILY HISTORY OF MALIGNANT NEOPLASM OF BREAST: ICD-10-CM

## 2024-05-11 DIAGNOSIS — Z79.899 OTHER LONG TERM (CURRENT) DRUG THERAPY: ICD-10-CM

## 2024-05-11 DIAGNOSIS — L84 CORNS AND CALLOSITIES: ICD-10-CM

## 2024-05-11 DIAGNOSIS — Z94.0 KIDNEY TRANSPLANT STATUS: ICD-10-CM

## 2024-05-11 DIAGNOSIS — Z79.82 LONG TERM (CURRENT) USE OF ASPIRIN: ICD-10-CM

## 2024-05-11 DIAGNOSIS — Z86.73 PERSONAL HISTORY OF TRANSIENT ISCHEMIC ATTACK (TIA), AND CEREBRAL INFARCTION WITHOUT RESIDUAL DEFICITS: ICD-10-CM

## 2024-05-11 DIAGNOSIS — Z85.828 PERSONAL HISTORY OF OTHER MALIGNANT NEOPLASM OF SKIN: ICD-10-CM

## 2024-05-11 DIAGNOSIS — G47.33 OBSTRUCTIVE SLEEP APNEA (ADULT) (PEDIATRIC): ICD-10-CM

## 2024-05-11 DIAGNOSIS — Z80.8 FAMILY HISTORY OF MALIGNANT NEOPLASM OF OTHER ORGANS OR SYSTEMS: ICD-10-CM

## 2024-05-11 DIAGNOSIS — Z79.4 LONG TERM (CURRENT) USE OF INSULIN: ICD-10-CM

## 2024-05-14 NOTE — PATIENT PROFILE ADULT - FALL HARM RISK - FACTORS NURSING JUDGEMENT
Patient Identification:  Name:  Manda Al  Age:  43 y.o.  Sex:  female  :  1980  MRN:  [id number]  Visit Number:  [id number]  Primary Care Provider:  Mi Rivera MD    Length of stay:  2    Subjective/Interval History/Consultants/Procedures     Chief Complaint:   Chief Complaint   Patient presents with    Flank Pain       Subjective/Interval History:    43 y.o. female who was admitted on 2024 with  acute pancreatitis     PMH is significant for anxiety, schizoaffective disorder- bipolar type, HTN, Crohn's disease   For complete admission information, please see history and physical.     Consultations:      Procedures/Scans:  CT abdomen and pelvis w & w/o contrast     Today, the patient was seen and examined, sitting up in bed in no distress. She reports abdominal pain continues to slowly improve. Less tender on palpation today as well. No diarrhea, no nausea or vomiting. She endorsed good UOP. No new complaints noted. Agreeable to advance diet at lunch.     Room location at the time of evaluation was 340b.    ----------------------------------------------------------------------------------------------------------------------  Current Hospital Meds:  Cariprazine HCl, 3 mg, Oral, Daily  cetirizine, 10 mg, Oral, Daily  FLUoxetine, 10 mg, Oral, Daily  insulin regular, 2-7 Units, Subcutaneous, Q6H  metoprolol succinate XL, 200 mg, Oral, Daily  sodium chloride, 10 mL, Intravenous, Q12H  sodium chloride, 10 mL, Intravenous, Q12H      sodium chloride, 125 mL/hr, Last Rate: 125 mL/hr (24 1045)      ----------------------------------------------------------------------------------------------------------------------      Objective     Vital Signs:  Temp:  [98 °F (36.7 °C)-99.7 °F (37.6 °C)] 98.4 °F (36.9 °C)  Heart Rate:  [65-79] 77  Resp:  [16-18] 18  BP: ()/(58-73) 105/67      24  1556 24  2036 24  0500   Weight: 97.1 kg (214 lb) 96.4 kg (212 lb 8.4 oz) 99.6 kg (219 lb  9.6 oz) (Rn Aware.)     Body mass index is 33.39 kg/m².    Intake/Output Summary (Last 24 hours) at 5/14/2024 1321  Last data filed at 5/14/2024 0218  Gross per 24 hour   Intake 1500 ml   Output --   Net 1500 ml     No intake/output data recorded.  Diet: Liquid; Clear Liquid; Fluid Consistency: Thin (IDDSI 0)  ----------------------------------------------------------------------------------------------------------------------    Physical Exam  Vitals and nursing note reviewed.   Constitutional:       General: She is not in acute distress.     Appearance: She is obese.   HENT:      Head: Normocephalic and atraumatic.   Eyes:      Extraocular Movements: Extraocular movements intact.   Cardiovascular:      Rate and Rhythm: Normal rate and regular rhythm.   Pulmonary:      Effort: Pulmonary effort is normal.      Breath sounds: Normal breath sounds.   Abdominal:      Palpations: Abdomen is soft.      Tenderness: There is abdominal tenderness (mild, improved from previous exam).   Musculoskeletal:      Right lower leg: No edema.      Left lower leg: No edema.   Skin:     General: Skin is warm and dry.   Neurological:      Mental Status: She is alert. Mental status is at baseline.   Psychiatric:         Mood and Affect: Mood normal.         Behavior: Behavior normal.                ----------------------------------------------------------------------------------------------------------------------  Tele:      ----------------------------------------------------------------------------------------------------------------------      Results from last 7 days   Lab Units 05/14/24  0559 05/13/24  0645 05/12/24 2021 05/12/24  1648   CRP mg/dL  --   --   --  0.56*   LACTATE mmol/L  --   --  1.5  --    WBC 10*3/mm3 6.99 8.06  --  8.89   HEMOGLOBIN g/dL 11.9* 11.4*  --  13.9   HEMATOCRIT % 35.0 34.3  --  39.2   MCV fL 91.6 91.5  --  87.1   MCHC g/dL 34.0 33.2  --  35.5   PLATELETS 10*3/mm3 230 243  --  308         Results from  "last 7 days   Lab Units 05/14/24  0508 05/13/24  0645 05/13/24  0549 05/12/24  1648 05/08/24  0557 05/07/24 2047   SODIUM mmol/L 133* 137  --  138 134* 131*   POTASSIUM mmol/L 4.9 4.3  4.3  --  3.5 2.9* 2.7*   MAGNESIUM mg/dL  --   --  2.9* 1.7  --  1.7   CHLORIDE mmol/L 105 109*  --  105 104 95*   CO2 mmol/L 18.6* 21.1*  --  21.6* 20.8* 22.7   BUN mg/dL 5* 5*  --  5* 6 8   CREATININE mg/dL 1.06* 0.92  --  0.94 1.13* 1.41*   CALCIUM mg/dL 8.3* 8.3*  --  8.8 7.5* 8.8   GLUCOSE mg/dL 105* 135*  --  278* 155* 188*   ALBUMIN g/dL  --  2.9*  --  3.4* 2.9* 3.4*   BILIRUBIN mg/dL  --  0.2  --  0.3 0.2 0.3   ALK PHOS U/L  --  62  --  74 65 85   AST (SGOT) U/L  --  27  --  23 16 20   ALT (SGPT) U/L  --  17  --  16 13 17   Estimated Creatinine Clearance: 85.4 mL/min (A) (by C-G formula based on SCr of 1.06 mg/dL (H)).  No results found for: \"AMMONIA\"      Blood Culture   Date Value Ref Range Status   05/12/2024 No growth at 24 hours  Preliminary   05/12/2024 No growth at 24 hours  Preliminary   05/12/2024 No growth at 24 hours  Preliminary     Urine Culture   Date Value Ref Range Status   05/12/2024 No growth  Final   05/07/2024 No growth  Final     No results found for: \"WOUNDCX\"  No results found for: \"STOOLCX\"  ----------------------------------------------------------------------------------------------------------------------  Imaging Results (Last 24 Hours)       ** No results found for the last 24 hours. **          ----------------------------------------------------------------------------------------------------------------------   I have reviewed the above laboratory values for 05/14/24    Assessment/Plan     Active Hospital Problems    Diagnosis  POA    **Acute pancreatitis [K85.90]  Yes         ASSESSMENT/PLAN:    Acute, recurrent,  pancreatitis  Continue supportive care measures- transitioned to PO oxycodone PRN.  Advanced diet to CLD and tolerated for lunch. Trial full liquids for dinner.  Continue IVF " replacement   Lipase repeated and downtrending.   Replace electrolytes as needed per protocol   Home chlorthalidone held on admission given association with pancreatitis.   Labs overall stable.        Abnormal UA  Recent UTI, s/p antibiotic course  Received rocephin in the ED and urine culture negative. Patient reports recently completed course of bactrim, flagyl in the outpatient setting as well. Hold further Abx  Does report  itching consistent with previous yeast infections. Diflucan x1 dose and repeat in 72 hours if symptoms persist- reports improvement on follow up      Chronic:   Anxiety/depression  Schizoaffective disorder- bipolar type  HTN  Crohn's disease  Home meds resumed as indicated  Monitor VS  Chlorthalidone held as above      -----------  -DVT prophylaxis: SCDs  -Disposition plans/anticipated needs: Pending course, if continues to improve likely home 24-48 hrs        The patient is high risk due to the following diagnoses/reasons:  Acute pancreatitis         Brenton Alves PA-C  05/14/24  13:21 EDT    Yes

## 2024-05-20 ENCOUNTER — INPATIENT (INPATIENT)
Facility: HOSPITAL | Age: 59
LOS: 4 days | Discharge: HOME | DRG: 639 | End: 2024-05-25
Attending: HOSPITALIST | Admitting: INTERNAL MEDICINE
Payer: MEDICARE

## 2024-05-20 ENCOUNTER — RESULT REVIEW (OUTPATIENT)
Age: 59
End: 2024-05-20

## 2024-05-20 VITALS
HEART RATE: 84 BPM | SYSTOLIC BLOOD PRESSURE: 133 MMHG | TEMPERATURE: 98 F | RESPIRATION RATE: 16 BRPM | WEIGHT: 315 LBS | HEIGHT: 76 IN | DIASTOLIC BLOOD PRESSURE: 74 MMHG | OXYGEN SATURATION: 100 %

## 2024-05-20 DIAGNOSIS — I48.91 UNSPECIFIED ATRIAL FIBRILLATION: ICD-10-CM

## 2024-05-20 DIAGNOSIS — I10 ESSENTIAL (PRIMARY) HYPERTENSION: ICD-10-CM

## 2024-05-20 DIAGNOSIS — Z89.421 ACQUIRED ABSENCE OF OTHER RIGHT TOE(S): Chronic | ICD-10-CM

## 2024-05-20 DIAGNOSIS — S91.301A UNSPECIFIED OPEN WOUND, RIGHT FOOT, INITIAL ENCOUNTER: ICD-10-CM

## 2024-05-20 DIAGNOSIS — E10.9 TYPE 1 DIABETES MELLITUS WITHOUT COMPLICATIONS: ICD-10-CM

## 2024-05-20 DIAGNOSIS — E78.5 HYPERLIPIDEMIA, UNSPECIFIED: ICD-10-CM

## 2024-05-20 DIAGNOSIS — Z94.0 KIDNEY TRANSPLANT STATUS: Chronic | ICD-10-CM

## 2024-05-20 DIAGNOSIS — N18.6 END STAGE RENAL DISEASE: Chronic | ICD-10-CM

## 2024-05-20 DIAGNOSIS — Z86.73 PERSONAL HISTORY OF TRANSIENT ISCHEMIC ATTACK (TIA), AND CEREBRAL INFARCTION WITHOUT RESIDUAL DEFICITS: ICD-10-CM

## 2024-05-20 DIAGNOSIS — Z98.890 OTHER SPECIFIED POSTPROCEDURAL STATES: Chronic | ICD-10-CM

## 2024-05-20 DIAGNOSIS — E11.42 TYPE 2 DIABETES MELLITUS WITH DIABETIC POLYNEUROPATHY: ICD-10-CM

## 2024-05-20 DIAGNOSIS — E11.621 TYPE 2 DIABETES MELLITUS WITH FOOT ULCER: ICD-10-CM

## 2024-05-20 DIAGNOSIS — Z29.9 ENCOUNTER FOR PROPHYLACTIC MEASURES, UNSPECIFIED: ICD-10-CM

## 2024-05-20 DIAGNOSIS — Z94.0 KIDNEY TRANSPLANT STATUS: ICD-10-CM

## 2024-05-20 LAB
ALBUMIN SERPL ELPH-MCNC: 3.2 G/DL — LOW (ref 3.3–5)
ALP SERPL-CCNC: 93 U/L — SIGNIFICANT CHANGE UP (ref 40–120)
ALT FLD-CCNC: 19 U/L — SIGNIFICANT CHANGE UP (ref 12–78)
ANION GAP SERPL CALC-SCNC: 2 MMOL/L — LOW (ref 5–17)
APTT BLD: 156.9 SEC — CRITICAL HIGH (ref 24.5–35.6)
APTT BLD: 28.4 SEC — SIGNIFICANT CHANGE UP (ref 24.5–35.6)
AST SERPL-CCNC: 13 U/L — LOW (ref 15–37)
BASOPHILS # BLD AUTO: 0.07 K/UL — SIGNIFICANT CHANGE UP (ref 0–0.2)
BASOPHILS NFR BLD AUTO: 1.2 % — SIGNIFICANT CHANGE UP (ref 0–2)
BILIRUB SERPL-MCNC: 0.5 MG/DL — SIGNIFICANT CHANGE UP (ref 0.2–1.2)
BUN SERPL-MCNC: 25 MG/DL — HIGH (ref 7–23)
CALCIUM SERPL-MCNC: 8.8 MG/DL — SIGNIFICANT CHANGE UP (ref 8.5–10.1)
CHLORIDE SERPL-SCNC: 108 MMOL/L — SIGNIFICANT CHANGE UP (ref 96–108)
CO2 SERPL-SCNC: 29 MMOL/L — SIGNIFICANT CHANGE UP (ref 22–31)
CREAT SERPL-MCNC: 1.2 MG/DL — SIGNIFICANT CHANGE UP (ref 0.5–1.3)
EGFR: 71 ML/MIN/1.73M2 — SIGNIFICANT CHANGE UP
EOSINOPHIL # BLD AUTO: 0.22 K/UL — SIGNIFICANT CHANGE UP (ref 0–0.5)
EOSINOPHIL NFR BLD AUTO: 3.8 % — SIGNIFICANT CHANGE UP (ref 0–6)
GLUCOSE SERPL-MCNC: 161 MG/DL — HIGH (ref 70–99)
HCT VFR BLD CALC: 38.8 % — LOW (ref 39–50)
HCT VFR BLD CALC: 40.1 % — SIGNIFICANT CHANGE UP (ref 39–50)
HGB BLD-MCNC: 12.6 G/DL — LOW (ref 13–17)
HGB BLD-MCNC: 13.3 G/DL — SIGNIFICANT CHANGE UP (ref 13–17)
IMM GRANULOCYTES NFR BLD AUTO: 0.2 % — SIGNIFICANT CHANGE UP (ref 0–0.9)
INR BLD: 0.92 RATIO — SIGNIFICANT CHANGE UP (ref 0.85–1.18)
LACTATE SERPL-SCNC: 0.8 MMOL/L — SIGNIFICANT CHANGE UP (ref 0.7–2)
LYMPHOCYTES # BLD AUTO: 1 K/UL — SIGNIFICANT CHANGE UP (ref 1–3.3)
LYMPHOCYTES # BLD AUTO: 17.2 % — SIGNIFICANT CHANGE UP (ref 13–44)
MCHC RBC-ENTMCNC: 29.6 PG — SIGNIFICANT CHANGE UP (ref 27–34)
MCHC RBC-ENTMCNC: 30.1 PG — SIGNIFICANT CHANGE UP (ref 27–34)
MCHC RBC-ENTMCNC: 32.5 GM/DL — SIGNIFICANT CHANGE UP (ref 32–36)
MCHC RBC-ENTMCNC: 33.2 GM/DL — SIGNIFICANT CHANGE UP (ref 32–36)
MCV RBC AUTO: 90.7 FL — SIGNIFICANT CHANGE UP (ref 80–100)
MCV RBC AUTO: 91.3 FL — SIGNIFICANT CHANGE UP (ref 80–100)
MONOCYTES # BLD AUTO: 0.56 K/UL — SIGNIFICANT CHANGE UP (ref 0–0.9)
MONOCYTES NFR BLD AUTO: 9.7 % — SIGNIFICANT CHANGE UP (ref 2–14)
NEUTROPHILS # BLD AUTO: 3.94 K/UL — SIGNIFICANT CHANGE UP (ref 1.8–7.4)
NEUTROPHILS NFR BLD AUTO: 67.9 % — SIGNIFICANT CHANGE UP (ref 43–77)
NRBC # BLD: 0 /100 WBCS — SIGNIFICANT CHANGE UP (ref 0–0)
NRBC # BLD: 0 /100 WBCS — SIGNIFICANT CHANGE UP (ref 0–0)
PLATELET # BLD AUTO: 204 K/UL — SIGNIFICANT CHANGE UP (ref 150–400)
PLATELET # BLD AUTO: 221 K/UL — SIGNIFICANT CHANGE UP (ref 150–400)
POTASSIUM SERPL-MCNC: 3.9 MMOL/L — SIGNIFICANT CHANGE UP (ref 3.5–5.3)
POTASSIUM SERPL-SCNC: 3.9 MMOL/L — SIGNIFICANT CHANGE UP (ref 3.5–5.3)
PROT SERPL-MCNC: 6.9 G/DL — SIGNIFICANT CHANGE UP (ref 6–8.3)
PROTHROM AB SERPL-ACNC: 10.8 SEC — SIGNIFICANT CHANGE UP (ref 9.5–13)
RBC # BLD: 4.25 M/UL — SIGNIFICANT CHANGE UP (ref 4.2–5.8)
RBC # BLD: 4.42 M/UL — SIGNIFICANT CHANGE UP (ref 4.2–5.8)
RBC # FLD: 14 % — SIGNIFICANT CHANGE UP (ref 10.3–14.5)
RBC # FLD: 14.2 % — SIGNIFICANT CHANGE UP (ref 10.3–14.5)
SODIUM SERPL-SCNC: 139 MMOL/L — SIGNIFICANT CHANGE UP (ref 135–145)
WBC # BLD: 5.8 K/UL — SIGNIFICANT CHANGE UP (ref 3.8–10.5)
WBC # BLD: 8.17 K/UL — SIGNIFICANT CHANGE UP (ref 3.8–10.5)
WBC # FLD AUTO: 5.8 K/UL — SIGNIFICANT CHANGE UP (ref 3.8–10.5)
WBC # FLD AUTO: 8.17 K/UL — SIGNIFICANT CHANGE UP (ref 3.8–10.5)

## 2024-05-20 PROCEDURE — 99285 EMERGENCY DEPT VISIT HI MDM: CPT

## 2024-05-20 PROCEDURE — 93306 TTE W/DOPPLER COMPLETE: CPT | Mod: 26

## 2024-05-20 PROCEDURE — 99223 1ST HOSP IP/OBS HIGH 75: CPT | Mod: GC

## 2024-05-20 PROCEDURE — 99221 1ST HOSP IP/OBS SF/LOW 40: CPT

## 2024-05-20 PROCEDURE — 93010 ELECTROCARDIOGRAM REPORT: CPT

## 2024-05-20 PROCEDURE — 99222 1ST HOSP IP/OBS MODERATE 55: CPT

## 2024-05-20 PROCEDURE — 71045 X-RAY EXAM CHEST 1 VIEW: CPT | Mod: 26

## 2024-05-20 RX ORDER — SODIUM CHLORIDE 9 MG/ML
1000 INJECTION, SOLUTION INTRAVENOUS
Refills: 0 | Status: DISCONTINUED | OUTPATIENT
Start: 2024-05-20 | End: 2024-05-21

## 2024-05-20 RX ORDER — ASPIRIN/CALCIUM CARB/MAGNESIUM 324 MG
81 TABLET ORAL DAILY
Refills: 0 | Status: DISCONTINUED | OUTPATIENT
Start: 2024-05-20 | End: 2024-05-21

## 2024-05-20 RX ORDER — NIFEDIPINE 30 MG
1 TABLET, EXTENDED RELEASE 24 HR ORAL
Refills: 0 | DISCHARGE

## 2024-05-20 RX ORDER — GABAPENTIN 400 MG/1
300 CAPSULE ORAL
Refills: 0 | Status: DISCONTINUED | OUTPATIENT
Start: 2024-05-20 | End: 2024-05-21

## 2024-05-20 RX ORDER — GLUCAGON INJECTION, SOLUTION 0.5 MG/.1ML
1 INJECTION, SOLUTION SUBCUTANEOUS ONCE
Refills: 0 | Status: DISCONTINUED | OUTPATIENT
Start: 2024-05-20 | End: 2024-05-21

## 2024-05-20 RX ORDER — ONDANSETRON 8 MG/1
4 TABLET, FILM COATED ORAL EVERY 8 HOURS
Refills: 0 | Status: DISCONTINUED | OUTPATIENT
Start: 2024-05-20 | End: 2024-05-21

## 2024-05-20 RX ORDER — NIFEDIPINE 30 MG
30 TABLET, EXTENDED RELEASE 24 HR ORAL
Refills: 0 | Status: DISCONTINUED | OUTPATIENT
Start: 2024-05-20 | End: 2024-05-21

## 2024-05-20 RX ORDER — SODIUM CHLORIDE 9 MG/ML
1000 INJECTION, SOLUTION INTRAVENOUS
Refills: 0 | Status: DISCONTINUED | OUTPATIENT
Start: 2024-05-20 | End: 2024-05-20

## 2024-05-20 RX ORDER — ACETAMINOPHEN 500 MG
650 TABLET ORAL EVERY 6 HOURS
Refills: 0 | Status: DISCONTINUED | OUTPATIENT
Start: 2024-05-20 | End: 2024-05-21

## 2024-05-20 RX ORDER — AZATHIOPRINE 100 MG/1
1 TABLET ORAL
Qty: 0 | Refills: 0 | DISCHARGE

## 2024-05-20 RX ORDER — CARVEDILOL PHOSPHATE 80 MG/1
12.5 CAPSULE, EXTENDED RELEASE ORAL EVERY 12 HOURS
Refills: 0 | Status: DISCONTINUED | OUTPATIENT
Start: 2024-05-20 | End: 2024-05-21

## 2024-05-20 RX ORDER — HEPARIN SODIUM 5000 [USP'U]/ML
10000 INJECTION INTRAVENOUS; SUBCUTANEOUS EVERY 6 HOURS
Refills: 0 | Status: DISCONTINUED | OUTPATIENT
Start: 2024-05-20 | End: 2024-05-21

## 2024-05-20 RX ORDER — ASPIRIN/CALCIUM CARB/MAGNESIUM 324 MG
1 TABLET ORAL
Qty: 0 | Refills: 0 | DISCHARGE

## 2024-05-20 RX ORDER — HEPARIN SODIUM 5000 [USP'U]/ML
10000 INJECTION INTRAVENOUS; SUBCUTANEOUS ONCE
Refills: 0 | Status: COMPLETED | OUTPATIENT
Start: 2024-05-20 | End: 2024-05-20

## 2024-05-20 RX ORDER — LOSARTAN POTASSIUM 100 MG/1
25 TABLET, FILM COATED ORAL AT BEDTIME
Refills: 0 | Status: DISCONTINUED | OUTPATIENT
Start: 2024-05-20 | End: 2024-05-21

## 2024-05-20 RX ORDER — AZATHIOPRINE 100 MG/1
100 TABLET ORAL DAILY
Refills: 0 | Status: DISCONTINUED | OUTPATIENT
Start: 2024-05-20 | End: 2024-05-21

## 2024-05-20 RX ORDER — DEXTROSE 50 % IN WATER 50 %
25 SYRINGE (ML) INTRAVENOUS ONCE
Refills: 0 | Status: DISCONTINUED | OUTPATIENT
Start: 2024-05-20 | End: 2024-05-21

## 2024-05-20 RX ORDER — LOVASTATIN 20 MG
1 TABLET ORAL
Qty: 0 | Refills: 0 | DISCHARGE

## 2024-05-20 RX ORDER — SODIUM CHLORIDE 9 MG/ML
1000 INJECTION INTRAMUSCULAR; INTRAVENOUS; SUBCUTANEOUS ONCE
Refills: 0 | Status: COMPLETED | OUTPATIENT
Start: 2024-05-20 | End: 2024-05-20

## 2024-05-20 RX ORDER — SODIUM CHLORIDE 9 MG/ML
1000 INJECTION INTRAMUSCULAR; INTRAVENOUS; SUBCUTANEOUS
Refills: 0 | Status: COMPLETED | OUTPATIENT
Start: 2024-05-20 | End: 2024-05-21

## 2024-05-20 RX ORDER — TACROLIMUS 5 MG/1
0 CAPSULE ORAL
Refills: 0 | DISCHARGE

## 2024-05-20 RX ORDER — PIPERACILLIN AND TAZOBACTAM 4; .5 G/20ML; G/20ML
3.38 INJECTION, POWDER, LYOPHILIZED, FOR SOLUTION INTRAVENOUS ONCE
Refills: 0 | Status: COMPLETED | OUTPATIENT
Start: 2024-05-20 | End: 2024-05-20

## 2024-05-20 RX ORDER — LANOLIN ALCOHOL/MO/W.PET/CERES
3 CREAM (GRAM) TOPICAL AT BEDTIME
Refills: 0 | Status: DISCONTINUED | OUTPATIENT
Start: 2024-05-20 | End: 2024-05-21

## 2024-05-20 RX ORDER — HYDRALAZINE HCL 50 MG
1 TABLET ORAL
Refills: 0 | DISCHARGE

## 2024-05-20 RX ORDER — HYDRALAZINE HCL 50 MG
1 TABLET ORAL
Qty: 0 | Refills: 0 | DISCHARGE

## 2024-05-20 RX ORDER — DEXTROSE 10 % IN WATER 10 %
125 INTRAVENOUS SOLUTION INTRAVENOUS ONCE
Refills: 0 | Status: DISCONTINUED | OUTPATIENT
Start: 2024-05-20 | End: 2024-05-21

## 2024-05-20 RX ORDER — GABAPENTIN 400 MG/1
1 CAPSULE ORAL
Qty: 0 | Refills: 0 | DISCHARGE

## 2024-05-20 RX ORDER — METOPROLOL TARTRATE 50 MG
1 TABLET ORAL
Qty: 0 | Refills: 0 | DISCHARGE

## 2024-05-20 RX ORDER — DEXTROSE 50 % IN WATER 50 %
12.5 SYRINGE (ML) INTRAVENOUS ONCE
Refills: 0 | Status: DISCONTINUED | OUTPATIENT
Start: 2024-05-20 | End: 2024-05-21

## 2024-05-20 RX ORDER — DEXTROSE 50 % IN WATER 50 %
15 SYRINGE (ML) INTRAVENOUS ONCE
Refills: 0 | Status: DISCONTINUED | OUTPATIENT
Start: 2024-05-20 | End: 2024-05-21

## 2024-05-20 RX ORDER — VANCOMYCIN HCL 1 G
1000 VIAL (EA) INTRAVENOUS ONCE
Refills: 0 | Status: COMPLETED | OUTPATIENT
Start: 2024-05-20 | End: 2024-05-20

## 2024-05-20 RX ORDER — TACROLIMUS 5 MG/1
1.5 CAPSULE ORAL
Refills: 0 | Status: DISCONTINUED | OUTPATIENT
Start: 2024-05-20 | End: 2024-05-21

## 2024-05-20 RX ORDER — INSULIN LISPRO 100/ML
1 VIAL (ML) SUBCUTANEOUS
Refills: 0 | Status: DISCONTINUED | OUTPATIENT
Start: 2024-05-20 | End: 2024-05-21

## 2024-05-20 RX ORDER — HEPARIN SODIUM 5000 [USP'U]/ML
5000 INJECTION INTRAVENOUS; SUBCUTANEOUS EVERY 6 HOURS
Refills: 0 | Status: DISCONTINUED | OUTPATIENT
Start: 2024-05-20 | End: 2024-05-21

## 2024-05-20 RX ORDER — ATORVASTATIN CALCIUM 80 MG/1
10 TABLET, FILM COATED ORAL AT BEDTIME
Refills: 0 | Status: DISCONTINUED | OUTPATIENT
Start: 2024-05-20 | End: 2024-05-21

## 2024-05-20 RX ORDER — HYDRALAZINE HCL 50 MG
100 TABLET ORAL THREE TIMES A DAY
Refills: 0 | Status: DISCONTINUED | OUTPATIENT
Start: 2024-05-20 | End: 2024-05-21

## 2024-05-20 RX ORDER — HEPARIN SODIUM 5000 [USP'U]/ML
INJECTION INTRAVENOUS; SUBCUTANEOUS
Qty: 25000 | Refills: 0 | Status: DISCONTINUED | OUTPATIENT
Start: 2024-05-20 | End: 2024-05-21

## 2024-05-20 RX ORDER — CEFEPIME 1 G/1
2000 INJECTION, POWDER, FOR SOLUTION INTRAMUSCULAR; INTRAVENOUS EVERY 8 HOURS
Refills: 0 | Status: DISCONTINUED | OUTPATIENT
Start: 2024-05-20 | End: 2024-05-21

## 2024-05-20 RX ORDER — CARVEDILOL PHOSPHATE 80 MG/1
1 CAPSULE, EXTENDED RELEASE ORAL
Refills: 0 | DISCHARGE

## 2024-05-20 RX ADMIN — CARVEDILOL PHOSPHATE 12.5 MILLIGRAM(S): 80 CAPSULE, EXTENDED RELEASE ORAL at 18:13

## 2024-05-20 RX ADMIN — Medication 0.2 MILLIGRAM(S): at 23:01

## 2024-05-20 RX ADMIN — HEPARIN SODIUM 2400 UNIT(S)/HR: 5000 INJECTION INTRAVENOUS; SUBCUTANEOUS at 16:37

## 2024-05-20 RX ADMIN — Medication 100 MILLIGRAM(S): at 23:01

## 2024-05-20 RX ADMIN — ATORVASTATIN CALCIUM 10 MILLIGRAM(S): 80 TABLET, FILM COATED ORAL at 23:01

## 2024-05-20 RX ADMIN — HEPARIN SODIUM 2400 UNIT(S)/HR: 5000 INJECTION INTRAVENOUS; SUBCUTANEOUS at 17:23

## 2024-05-20 RX ADMIN — GABAPENTIN 300 MILLIGRAM(S): 400 CAPSULE ORAL at 18:13

## 2024-05-20 RX ADMIN — LOSARTAN POTASSIUM 25 MILLIGRAM(S): 100 TABLET, FILM COATED ORAL at 23:01

## 2024-05-20 RX ADMIN — TACROLIMUS 1.5 MILLIGRAM(S): 5 CAPSULE ORAL at 20:57

## 2024-05-20 RX ADMIN — HEPARIN SODIUM 10000 UNIT(S): 5000 INJECTION INTRAVENOUS; SUBCUTANEOUS at 16:36

## 2024-05-20 RX ADMIN — SODIUM CHLORIDE 1000 MILLILITER(S): 9 INJECTION INTRAMUSCULAR; INTRAVENOUS; SUBCUTANEOUS at 10:33

## 2024-05-20 RX ADMIN — HEPARIN SODIUM 2400 UNIT(S)/HR: 5000 INJECTION INTRAVENOUS; SUBCUTANEOUS at 19:17

## 2024-05-20 RX ADMIN — SODIUM CHLORIDE 100 MILLILITER(S): 9 INJECTION INTRAMUSCULAR; INTRAVENOUS; SUBCUTANEOUS at 23:40

## 2024-05-20 RX ADMIN — PIPERACILLIN AND TAZOBACTAM 200 GRAM(S): 4; .5 INJECTION, POWDER, LYOPHILIZED, FOR SOLUTION INTRAVENOUS at 10:32

## 2024-05-20 RX ADMIN — Medication 30 MILLIGRAM(S): at 18:13

## 2024-05-20 RX ADMIN — Medication 250 MILLIGRAM(S): at 12:10

## 2024-05-20 RX ADMIN — CEFEPIME 100 MILLIGRAM(S): 1 INJECTION, POWDER, FOR SOLUTION INTRAMUSCULAR; INTRAVENOUS at 18:12

## 2024-05-20 NOTE — CARE COORDINATION ASSESSMENT. - NSCAREPROVIDERS_GEN_ALL_CORE_FT
CARE PROVIDERS:  Accepting Physician: Kristian Castro  Access Services: Yaritza Delgado  Administration: Terence Ray  Admitting: Kristian Castro  Attending: Kristian Castro  Cardiology Technician: Ferdinand Pereira  Consultant: Emelia Gagnon  Consultant: Becki Campbell  Consultant: Nell Schilling  Consultant: Perry Locke  Consultant: Yina Garcias  Consultant: Flor Montes  Covering Nurse: Senthil Pham  ED Attending: Mary Beth Oliva  ED Nurse: Kati Fletcher  Nurse: Jackie Rodriguez  Ordered: ServiceAccount, [facility]  Outpatient Provider: Nirmal Bryant  Outpatient Provider: Vinayak Gill  Outpatient Provider: José Kelly  Outpatient Provider: Charly Triana  Outpatient Provider: Charly Triana  Override: Jackie Rodriguez  PCA/Nursing Assistant: Tamar Soriano  Primary Team: Kristian Castro  Primary Team: Kerline Lr  Primary Team: Mikayla Huerta  : Albina Perry  : Mariah Stringer// Supp. Assoc.: Zayda Painter

## 2024-05-20 NOTE — CARE COORDINATION ASSESSMENT. - OTHER PERTINENT REFERRAL INFORMATION
Pt is A & O x 4. Pt was Indep with ADl 'S PTA. Pt has 2 CHESTER an lives in a spilt level with 4 step up to Bedroom and 4 steps down to den. Pt has had HC from [facility] in the past. No DME at home.

## 2024-05-20 NOTE — H&P ADULT - PROBLEM SELECTOR PLAN 8
DVT Prophylaxis: Heparin 5000u subq q8hrs DVT Prophylaxis: heparin gtt for new onset A fib DVT Prophylaxis: Heparin gtt for new onset A fib

## 2024-05-20 NOTE — ED PROVIDER NOTE - NSICDXPASTMEDICALHX_GEN_ALL_CORE_FT
PAST MEDICAL HISTORY:  CKD (chronic kidney disease) Renal Transplant 2013 at [facility]    CVA (cerebral vascular accident) [place] Stroke  low L body sensation  low R face sensation  decreased peripheral vision  poor balance  2015      Diabetes mellitus Type 1 on insulin pump    Diabetic peripheral neuropathy     History of DVT (deep vein thrombosis) LLE 2016, was on Eliquis x 6 months  Was hospitalized for Rhinovirus at the time, intubated    History of pleural effusion right thoracentesis 6/2022 and 8/2022    History of restrictive lung disease     Hyperlipidemia     Hypertension     Obesity (BMI 30-39.9)     CARMEN treated with BiPAP 2L O2 at night    Recurrent squamous cell carcinoma of skin nose, L arm    Squamous cell carcinoma of skin of left ear nose

## 2024-05-20 NOTE — H&P ADULT - PROBLEM SELECTOR PLAN 7
DVT Prophylaxis: Heparin 5000u subq q8hrs Chronic   - Continue therapeutic interchange for home Lovastatin at bedtime   - F/u AM lipid panel

## 2024-05-20 NOTE — H&P ADULT - PROBLEM SELECTOR PLAN 2
EKG on admission showed new onset Afib, rate controlled. No known cardiac conditions in the past.   - Cxray: No acute finding or change.  - EKbpm, QTc 418, Afib  - TTE (): EF 65-70%, no significant valvular pathology   - Stress Test  no signs of ischemia  - F/u repeat EKG   - F/u TTE  - Continue home Carvedilol   - Continue home ASA   - Cardio consulted, f/u recs EKG on admission showed new onset Afib, rate controlled. No known cardiac conditions in the past.   - Cxray: No acute finding or change.  - EKbpm, QTc 418, Afib  - TTE (): EF 65-70%, no significant valvular pathology   - Stress Test  no signs of ischemia  - F/u repeat EKG   - F/u TTE  - Continue home Carvedilol 12.5mg BID  - Cardio consulted, f/u recs EKG on admission showed new onset Afib, rate controlled. No known cardiac conditions in the past.   - Cxray: No acute finding or change.  - EKbpm, QTc 418, Afib  - TTE (): EF 65-70%, no significant valvular pathology   - Stress Test  no signs of ischemia  - F/u repeat EKG   - F/u TTE  - Continue home Carvedilol 12.5mg BID  -continue heparin gtt  - Cardio consulted, f/u recs EKG on admission showed new onset Afib, rate controlled. No known cardiac conditions in the past.   - Cxray: No acute finding or change.  - EKbpm, QTc 418, Afib  - TTE (): EF 65-70%, no significant valvular pathology   - Stress Test  no signs of ischemia  - F/u repeat EKG   - F/u TTE  - Continue home Carvedilol 12.5mg BID  - Start heparin gtt  - Cardio consulted, f/u recs

## 2024-05-20 NOTE — CONSULT NOTE ADULT - SUBJECTIVE AND OBJECTIVE BOX
[facility], Division of Infectious Diseases  MEL Mckoy S. Shah, [facility]  [phone]    LUTHER NORIEGA  58y, Male  [id number]    HPI--  HPI:  Patient is a 59yo M with a PMH of type 1 diabetes mellitus, HTN, HLD, CVA (), prior DVT (s/p Eliquis), recurrent right pleural effusion with VATS, renal transplant (), sent in from wound care for a non healing right foot wound. Patient has been following with outpatient wound care for the non healing wound on his right foot, and has noted that it has progressively gotten worse. He was sent in for a right  foot exostectomy for tomorrow 24 with Dr. Catherine. Patient notes that there has been a small amount of bloody drainage from the wound, but denies any pain, pus or foul smell. He is able to bear weight on his right foot and does not have any problems walking. He has peripheral neuropathy so he has decreased sensation in his b/l LE, but no tingling. Denies fever, chills, chest pain, palpitations, SOB, cough, abdominal pain, n/v/d/c, dysuria, hematuria, changes in vision, dizziness,     ED course:  Vitals: /74, HR 84, T 98.2, RR 16 SpO2 100% on RA   Labs: H/H 12.6/38.8, BUN/Cr 25/1.2  Given: IV Zosyn x1, IV Vancomycin 1g x1, 1L NaCl bolus x1   EKbpm, QTc 418, Afib    Imaging  Cxray: No acute finding or change.  Right Foot Xray:  Status post resection of the fifth ray at the level of the distal metatarsal and proximal phalanx. Status post resection of the second and third digits. No cortical erosion or periosteal reaction. There is a plantar calcaneal enthesophyte. Strength is present within the medial navicular.   (20 May 2024 12:52)        Active Medications--  acetaminophen     Tablet .. 650 milliGRAM(s) Oral every 6 hours PRN  aluminum hydroxide/magnesium hydroxide/simethicone Suspension 30 milliLiter(s) Oral every 4 hours PRN  aspirin  chewable 81 milliGRAM(s) Oral daily  atorvastatin 10 milliGRAM(s) Oral at bedtime  azaTHIOprine 100 milliGRAM(s) Oral daily  carvedilol 12.5 milliGRAM(s) Oral every 12 hours  cefepime   IVPB 2000 milliGRAM(s) IV Intermittent every 8 hours  cloNIDine 0.2 milliGRAM(s) Oral three times a day  dextrose 10% Bolus 125 milliLiter(s) IV Bolus once  dextrose 5%. 1000 milliLiter(s) IV Continuous <Continuous>  dextrose 5%. 1000 milliLiter(s) IV Continuous <Continuous>  dextrose 50% Injectable 12.5 Gram(s) IV Push once  dextrose 50% Injectable 25 Gram(s) IV Push once  dextrose Oral Gel 15 Gram(s) Oral once PRN  gabapentin 300 milliGRAM(s) Oral two times a day  glucagon  Injectable 1 milliGRAM(s) IntraMuscular once  heparin   Injectable 5000 Unit(s) IV Push every 6 hours PRN  heparin   Injectable 39359 Unit(s) IV Push once  heparin   Injectable 45370 Unit(s) IV Push every 6 hours PRN  heparin  Infusion.  Unit(s)/Hr IV Continuous <Continuous>  hydrALAZINE 100 milliGRAM(s) Oral three times a day  insulin lispro (HumaLOG) Pump 1 Each SubCutaneous Continuous Pump  losartan 25 milliGRAM(s) Oral at bedtime  melatonin 3 milliGRAM(s) Oral at bedtime PRN  NIFEdipine XL 30 milliGRAM(s) Oral two times a day  ondansetron Injectable 4 milliGRAM(s) IV Push every 8 hours PRN  tacrolimus 1.5 milliGRAM(s) Oral <User Schedule>  trimethoprim   80 mG/sulfamethoxazole 400 mG 1 Tablet(s) Oral daily    Antimicrobials:   cefepime   IVPB 2000 milliGRAM(s) IV Intermittent every 8 hours  trimethoprim   80 mG/sulfamethoxazole 400 mG 1 Tablet(s) Oral daily    Immunologic: azaTHIOprine 100 milliGRAM(s) Oral daily  tacrolimus 1.5 milliGRAM(s) Oral <User Schedule>      ROS:  unable to obtain    Allergies: No Known Allergies    PMH -- Hypertension    Hyperlipidemia    Diabetes mellitus    CKD (chronic kidney disease)    Diabetic peripheral neuropathy    Obesity (BMI 30-39.9)    CVA (cerebral vascular accident)    Squamous cell carcinoma of skin of left ear    History of DVT (deep vein thrombosis)    Recurrent squamous cell carcinoma of skin    History of pleural effusion    CARMEN treated with BiPAP    History of restrictive lung disease      PSH -- Toe infection    Ear disease    Vasectomy status    H/O foot surgery    End-stage renal failure with renal transplant    S/P kidney transplant    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    History of loop recorder      FH -- No pertinent family history in first degree relatives    Family history of diabetes mellitus (DM)    FH: skin cancer      Social History --  EtOH: denies   Tobacco: denies   Drug Use: denies     Travel/Environmental/Occupational History:    Physical Exam--  Vital Signs Last 24 Hrs  T(F): 97.6 (20 May 2024 14:07), Max: 98.2 (20 May 2024 09:15)  HR: 67 (20 May 2024 14:07) (67 - 84)  BP: 118/71 (20 May 2024 14:07) (118/71 - 133/74)  RR: 16 (20 May 2024 14:07) (16 - 16)  SpO2: 94% (20 May 2024 14:07) (94% - 100%)  General: NAD  HEENT: NC/AT  Lungs: CTA  Heart: RRR  Abdomen: Soft   Extremities: No cyanosis or clubbing. No edema.   Skin: Warm. Dry.     Laboratory & Imaging Data:  CBC:                       12.6   5.80  )-----------( 204      ( 20 May 2024 10:10 )             38.8     CMP:     139  |  108  |  25<H>  ----------------------------<  161<H>  3.9   |  29  |  1.20    Ca    8.8      20 May 2024 10:10    TPro  6.9  /  Alb  3.2<L>  /  TBili  0.5  /  DBili  x   /  AST  13<L>  /  ALT  19  /  AlkPhos  93  05-20    LIVER FUNCTIONS - ( 20 May 2024 10:10 )  Alb: 3.2 g/dL / Pro: 6.9 g/dL / ALK PHOS: 93 U/L / ALT: 19 U/L / AST: 13 U/L / GGT: x           Urinalysis Basic - ( 20 May 2024 10:10 )    Color: x / Appearance: x / SG: x / pH: x  Gluc: 161 mg/dL / Ketone: x  / Bili: x / Urobili: x   Blood: x / Protein: x / Nitrite: x   Leuk Esterase: x / RBC: x / WBC x   Sq Epi: x / Non Sq Epi: x / Bacteria: x        Microbiology: reviewed        Radiology: reviewed    < from: Xray Chest 1 View-PORTABLE IMMEDIATE (24 @ 09:50) >    ACC: [id number] EXAM:  XR CHEST PORTABLE IMMED 1V   ORDERED BY: MASON BRIZUELA     PROCEDURE DATE:  2024          INTERPRETATION:  AP chest on May 20, 2024 at 9:45 AM. Patient is short of   breath with cough and fever.    Elevated diaphragms again noted. Heart normal for projection.    Lungs are clear and chest is similar to 2023.    IMPRESSION: No acute finding or change.    --- End of Report ---            YURIY JOENS MD; Attending Radiologist  This document has been electronically signed. May 20 2024 10:38AM    < end of copied text >

## 2024-05-20 NOTE — H&P ADULT - PROBLEM SELECTOR PLAN 4
Patient is s/p right renal transplant in 2013. Baseline Cr around 1.2. Follows Nephro Dr. Chi and Dr. Stone   - BUN/Cr 25/1.2  - Continue home Azathioprine 100mg daily   - Continue home Tacrolimus 1.5 daily at 9:30 and 21:30  - Avoid nephrotoxic meds   - Monitor daily CMP Patient is s/p right renal transplant in 2013. Baseline Cr around 1.2. Follows Nephro Dr. Chi and Dr. Stone   - BUN/Cr 25/1.2  - Continue home Azathioprine 100mg daily   - Continue home Tacrolimus 1.5 daily at 9:30 and 21:30  - Continue home Bactrim for prophylaxis   - Avoid nephrotoxic meds   - Monitor daily CMP

## 2024-05-20 NOTE — H&P ADULT - NSHPREVIEWOFSYSTEMS_GEN_ALL_CORE
GENERAL: No fever or chills  EYES: no change in vision   HEENT: no trouble swallowing or speaking   CARDIAC: no chest pain   PULMONARY: no cough or SOB  GI:  No abdominal pain  : No changes in urination   SKIN: no rashes   NEURO: no headache   MSK: No joint pain     All other ROS negative unless otherwise specified in HPI. GENERAL: No fever or chills  EYES: no change in vision   HEENT: no trouble swallowing or speaking   CARDIAC: no chest pain, palpitations   PULMONARY: no cough or SOB  GI:  No abdominal pain  : No changes in urination   SKIN: +wound on the bottom of right foot   NEURO: no headache, +decreased sensation b/l LE    MSK: No joint pain     All other ROS negative unless otherwise specified in HPI.

## 2024-05-20 NOTE — H&P ADULT - PROBLEM SELECTOR PLAN 6
Chronic   - Continue home Lovastatin 40mg at bedtime   - F/u AM lipid panel Patient had CVA in 2015, unclear etiology. Wore a loop recorder for 4yrs with no cardiac events   - No focal deficits   - Continue home ASA daily   - Continue statin daily

## 2024-05-20 NOTE — PATIENT PROFILE ADULT - FALL HARM RISK - HARM RISK INTERVENTIONS

## 2024-05-20 NOTE — H&P ADULT - NSHPPHYSICALEXAM_GEN_ALL_CORE
T(C): 36.8 (05-20-24 @ 09:15), Max: 36.8 (05-20-24 @ 09:15)  HR: 84 (05-20-24 @ 09:15) (84 - 84)  BP: 133/74 (05-20-24 @ 09:15) (133/74 - 133/74)  RR: 16 (05-20-24 @ 09:15) (16 - 16)  SpO2: 100% (05-20-24 @ 09:15) (100% - 100%)  Wt(kg): --    Physical Exam:   GENERAL: well-groomed, well-developed, NAD  HEENT: head NC/AT; EOM intact, PERRLA, conjunctiva & sclera clear; hearing grossly intact, moist mucous membranes  NECK: supple, no JVD  RESPIRATORY: CTA B/L, no wheezing, rales, rhonchi or rubs  CARDIOVASCULAR: S1&S2, RRR, no murmurs or gallops  ABDOMEN: soft, non-tender, non-distended, + Bowel sounds x4 quadrants, no guarding, rebound or rigidity  MUSCULOSKELETAL:  no clubbing, cyanosis or edema of all 4 extremities  LYMPH: no cervical lymphadenopathy  VASCULAR: Radial pulses 2+ bilaterally, no varicose veins   SKIN: warm and dry, color normal  NEUROLOGIC: AA&O X3, CN2-12 intact w/ no focal deficits, no sensory loss, motor Strength 5/5 in UE & LE B/L  Psych: Normal mood and affect, normal behavior T(C): 36.8 (05-20-24 @ 09:15), Max: 36.8 (05-20-24 @ 09:15)  HR: 84 (05-20-24 @ 09:15) (84 - 84)  BP: 133/74 (05-20-24 @ 09:15) (133/74 - 133/74)  RR: 16 (05-20-24 @ 09:15) (16 - 16)  SpO2: 100% (05-20-24 @ 09:15) (100% - 100%)  Wt(kg): --    Physical Exam:   GENERAL: well-groomed, well-developed, NAD  HEENT: head NC/AT; EOM intact, PERRLA, conjunctiva & sclera clear; hearing grossly intact, moist mucous membranes  NECK: supple, no JVD  RESPIRATORY: CTA B/L, no wheezing, rales, rhonchi or rubs  CARDIOVASCULAR: S1&S2, no murmurs or gallops  ABDOMEN: soft, non-tender, non-distended, + Bowel sounds x4 quadrants, no guarding, rebound or rigidity  MUSCULOSKELETAL:  +mild LE edema b/l LE, no clubbing, cyanosis of all 4 extremities  VASCULAR: pedal pulses 2+ bilaterally, no varicose veins   SKIN: 2.0cm x 1.0cm x down to bone with tracking noted to the wound ,(+)  probe to bone, mild  periwound erythema, no fluctuance, no malodor, no proximal streaking at this time  NEUROLOGIC: AA&O X4, CN2-12 intact w/ no focal deficits, no sensory loss, motor Strength 5/5 in UE & LE B/L, decrease sensation b/l LE   Psych: Normal mood and affect, normal behavior

## 2024-05-20 NOTE — H&P ADULT - ATTENDING COMMENTS
Patient is a 57yo M with a PMH of type 1 diabetes mellitus, HTN, HLD, CVA (2015), prior DVT (s/p Eliquis), recurrent right pleural effusion with VATS, renal transplant (2013), sent in from wound care for a non healing right foot wound.     HPI as above.     T(C): 36.4 (05-20-24 @ 14:07), Max: 36.8 (05-20-24 @ 09:15)  HR: 67 (05-20-24 @ 14:07) (67 - 84)  BP: 118/71 (05-20-24 @ 14:07) (118/71 - 133/74)  RR: 16 (05-20-24 @ 14:07) (16 - 16)  SpO2: 94% (05-20-24 @ 14:07) (94% - 100%)  Wt(kg): --    Physical Exam:   GENERAL: well-groomed, well-developed, NAD  HEENT: head NC/AT; conjunctiva & sclera clear; hearing grossly intact, moist mucous membranes  NECK: supple, no JVD  RESPIRATORY: CTA B/L, no wheezing, rales, rhonchi or rubs  CARDIOVASCULAR: S1&S2, RRR  ABDOMEN: soft, non-tender, non-distended, + Bowel sounds x4 quadrants, no guarding, rebound or rigidity  MUSCULOSKELETAL:  b/l LE pitting edema trace  LYMPH: no cervical lymphadenopathy  VASCULAR: Radial pulses 2+ bilaterally, no varicose veins   SKIN: right foot wound  NEUROLOGIC: AA&O X3,   Psych: Normal mood and affect, normal behavior    Plan:  Continue IV abx  -cardio consulted for cardiac optimization given found to have new onset A fib.   -mild anemia noted continue to monitor  -Endo Dr Perlman consulted for patients DM1 and insulin pump management. Pat Weil NP consulted as well    remainder as above.

## 2024-05-20 NOTE — CONSULT NOTE ADULT - SUBJECTIVE AND OBJECTIVE BOX
Patient is a 58y old  Male who presents with a chief complaint of Right foot wound (20 May 2024 13:59)    Type: DX year known complications Endocrine Last seen Rx home Hx DKA/HHS, Glucometer checks, needs, weight, diet, exercise  diabetes education provided- A1c measure and BG targets  fasting, <180 2 hours postmeal. medication MOA and considerations/side effects, inhospital BGM frequency and insulin administration, s/s of hyperglycemia/hypoglycemia and management, glycemic control and preventing complications, consistent carb diet, balanced plate method, consistent meal planning. sick day management, provider f/u  Hx ASCVD, CKD, HF    HPI:  Patient is a 57yo M with a PMH of type 1 diabetes mellitus, HTN, HLD, CVA (), prior DVT (s/p Eliquis), recurrent right pleural effusion with VATS, renal transplant (), sent in from wound care for a non healing right foot wound. Patient has been following with outpatient wound care for the non healing wound on his right foot, and has noted that it has progressively gotten worse. He was sent in for a right  foot exostectomy for tomorrow 24 with Dr. Catherine. Patient notes that there has been a small amount of bloody drainage from the wound, but denies any pain, pus or foul smell. He is able to bear weight on his right foot and does not have any problems walking. He has peripheral neuropathy so he has decreased sensation in his b/l LE, but no tingling. Denies fever, chills, chest pain, palpitations, SOB, cough, abdominal pain, n/v/d/c, dysuria, hematuria, changes in vision, dizziness,       ED course:  Vitals: /74, HR 84, T 98.2, RR 16 SpO2 100% on RA   Labs: H/H 12.6/38.8, BUN/Cr 25/1.2  Given: IV Zosyn x1, IV Vancomycin 1g x1, 1L NaCl bolus x1   EKbpm, QTc 418, Afib      Imaging  Cxray: No acute finding or change.  Right Foot Xray:  Status post resection of the fifth ray at the level of the distal metatarsal and proximal phalanx. Status post resection of the second and third digits. No cortical erosion or periosteal reaction. There is a plantar calcaneal enthesophyte. Strength is present within the medial navicular.   (20 May 2024 12:52)      PAST MEDICAL & SURGICAL HISTORY:  Hypertension      Hyperlipidemia      Diabetes mellitus  Type 1 on insulin pump      CKD (chronic kidney disease)  Renal Transplant  at [facility]      Diabetic peripheral neuropathy      Obesity (BMI 30-39.9)      CVA (cerebral vascular accident)  Wallenberg Stroke  low L body sensation  low R face sensation  decreased peripheral vision  poor balance          Squamous cell carcinoma of skin of left ear  nose      History of DVT (deep vein thrombosis)  LLE , was on Eliquis x 6 months  Was hospitalized for Rhinovirus at the time, intubated      Recurrent squamous cell carcinoma of skin  nose, L arm      History of pleural effusion  right thoracentesis 2022 and 2022      CARMEN treated with BiPAP  2L O2 at night      History of restrictive lung disease      Toe infection   L 4th Toe surgery-amputation secondary to osteomyelitis   partial right 2nd 4th toe amputation      Ear disease  Left inner ear surgery, 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, Can NOT have MRI      Vasectomy status  s/p b/l  vasectomy      H/O foot surgery   - right foot - bone fracture - s/p ORIF and subsequent removal of hardware      End-stage renal failure with renal transplant  2013      S/P kidney transplant        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      History of loop recorder  2015          REVIEW OF SYSTEMS  General:	as above  Respiratory: NAD, No SOB, no cough  Cardiovascular: No chest pain, no palpitations	  Endocrine: no polyuria, no polydipsia, or S/S of hypoglycemia  Neuro: denies numbess, no tingling	  Other:	      Allergies    No Known Allergies    Intolerances        MEDICATIONS  (STANDING):  aspirin  chewable 81 milliGRAM(s) Oral daily  atorvastatin 10 milliGRAM(s) Oral at bedtime  azaTHIOprine 100 milliGRAM(s) Oral daily  carvedilol 12.5 milliGRAM(s) Oral every 12 hours  cefepime   IVPB 2000 milliGRAM(s) IV Intermittent every 8 hours  cloNIDine 0.2 milliGRAM(s) Oral three times a day  gabapentin 300 milliGRAM(s) Oral two times a day  heparin   Injectable 37480 Unit(s) IV Push once  heparin  Infusion.  Unit(s)/Hr (24 mL/Hr) IV Continuous <Continuous>  hydrALAZINE 100 milliGRAM(s) Oral three times a day  losartan 25 milliGRAM(s) Oral at bedtime  NIFEdipine XL 30 milliGRAM(s) Oral two times a day  tacrolimus 1.5 milliGRAM(s) Oral <User Schedule>  trimethoprim   80 mG/sulfamethoxazole 400 mG 1 Tablet(s) Oral daily       Patient is a 58y old  Male who presents with a chief complaint of Right foot wound (20 May 2024 13:59)    Type 1 DM DX age 29, known complications DFU, R Charcot foot. Endocrine- Dr. Jasbir Garcia Last seen 2 weeks ago Rx home humalog in minimed pump with dexcom g6 CGM not synced w/ pump. basal rate 3 u/hr, does not use bolus wizard. no ISF or ICR set, BGT 120mg/dL. visually estimates carbs and boluses 5-10 units as needed.  last site change , pt signed attestation and self-assessment,  wife brought inadditional supplies for 3 site changes. reccommend lowering basal rate at midnight of day scheduled for OR, pt knows how to reduce/temp basal. verbal education provided  diabetes education provided- A1c measure and BG targets  fasting, <180 2 hours postmeal. inhospital BGM frequency and insulin administration, s/s of hyperglycemia/hypoglycemia and management, glycemic control and preventing complications, consistent carb diet, balanced plate method, consistent meal planning. sick day management, provider f/u  Hx HLD, CVA, renal transplant    HPI:  Patient is a 59yo M with a PMH of type 1 diabetes mellitus, HTN, HLD, CVA (), prior DVT (s/p Eliquis), recurrent right pleural effusion with VATS, renal transplant (), sent in from wound care for a non healing right foot wound. Patient has been following with outpatient wound care for the non healing wound on his right foot, and has noted that it has progressively gotten worse. He was sent in for a right  foot exostectomy for tomorrow 24 with Dr. Catherine. Patient notes that there has been a small amount of bloody drainage from the wound, but denies any pain, pus or foul smell. He is able to bear weight on his right foot and does not have any problems walking. He has peripheral neuropathy so he has decreased sensation in his b/l LE, but no tingling. Denies fever, chills, chest pain, palpitations, SOB, cough, abdominal pain, n/v/d/c, dysuria, hematuria, changes in vision, dizziness,       ED course:  Vitals: /74, HR 84, T 98.2, RR 16 SpO2 100% on RA   Labs: H/H 12.6/38.8, BUN/Cr 25/1.2  Given: IV Zosyn x1, IV Vancomycin 1g x1, 1L NaCl bolus x1   EKbpm, QTc 418, Afib      Imaging  Cxray: No acute finding or change.  Right Foot Xray:  Status post resection of the fifth ray at the level of the distal metatarsal and proximal phalanx. Status post resection of the second and third digits. No cortical erosion or periosteal reaction. There is a plantar calcaneal enthesophyte. Strength is present within the medial navicular.   (20 May 2024 12:52)      PAST MEDICAL & SURGICAL HISTORY:  Hypertension      Hyperlipidemia      Diabetes mellitus  Type 1 on insulin pump      CKD (chronic kidney disease)  Renal Transplant  at [facility]      Diabetic peripheral neuropathy      Obesity (BMI 30-39.9)      CVA (cerebral vascular accident)  [place]enberg Stroke  low L body sensation  low R face sensation  decreased peripheral vision  poor balance          Squamous cell carcinoma of skin of left ear  nose      History of DVT (deep vein thrombosis)  LLE , was on Eliquis x 6 months  Was hospitalized for Rhinovirus at the time, intubated      Recurrent squamous cell carcinoma of skin  nose, L arm      History of pleural effusion  right thoracentesis 2022 and 2022      CARMEN treated with BiPAP  2L O2 at night      History of restrictive lung disease      Toe infection   L 4th Toe surgery-amputation secondary to osteomyelitis   partial right 2nd 4th toe amputation      Ear disease  Left inner ear surgery, 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, Can NOT have MRI      Vasectomy status  s/p b/l  vasectomy      H/O foot surgery   - right foot - bone fracture - s/p ORIF and subsequent removal of hardware      End-stage renal failure with renal transplant  2013      S/P kidney transplant  2013      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      History of loop recorder  2015          REVIEW OF SYSTEMS  General:	as above  Respiratory: NAD, No SOB, no cough  Cardiovascular: No chest pain, no palpitations	  Endocrine: no polyuria, no polydipsia, or S/S of hypoglycemia  Neuro: denies numbess, no tingling	  Other:	      Allergies    No Known Allergies    Intolerances        MEDICATIONS  (STANDING):  aspirin  chewable 81 milliGRAM(s) Oral daily  atorvastatin 10 milliGRAM(s) Oral at bedtime  azaTHIOprine 100 milliGRAM(s) Oral daily  carvedilol 12.5 milliGRAM(s) Oral every 12 hours  cefepime   IVPB 2000 milliGRAM(s) IV Intermittent every 8 hours  cloNIDine 0.2 milliGRAM(s) Oral three times a day  gabapentin 300 milliGRAM(s) Oral two times a day  heparin   Injectable 73075 Unit(s) IV Push once  heparin  Infusion.  Unit(s)/Hr (24 mL/Hr) IV Continuous <Continuous>  hydrALAZINE 100 milliGRAM(s) Oral three times a day  losartan 25 milliGRAM(s) Oral at bedtime  NIFEdipine XL 30 milliGRAM(s) Oral two times a day  tacrolimus 1.5 milliGRAM(s) Oral <User Schedule>  trimethoprim   80 mG/sulfamethoxazole 400 mG 1 Tablet(s) Oral daily

## 2024-05-20 NOTE — PATIENT PROFILE ADULT - FUNCTIONAL ASSESSMENT - BASIC MOBILITY 6.
4-calculated by average/Not able to assess (calculate score using [facility] averaging method)

## 2024-05-20 NOTE — CONSULT NOTE ADULT - SUBJECTIVE AND OBJECTIVE BOX
Vascular Attending:  Dr Rizzo      HPI:  Patient is a 59yo M with a PMH of type 1 diabetes mellitus, HTN, HLD, CVA (), prior DVT (s/p Eliquis), recurrent right pleural effusion with VATS, renal transplant (), sent in from wound care for a non healing right foot wound. Patient has been following with outpatient wound care for the non healing wound on his right foot, and has noted that it has progressively gotten worse. He was sent in for a right  foot exostectomy for tomorrow 24 with Dr. Catherine. Patient notes that there has been a small amount of bloody drainage from the wound, but denies any pain, pus or foul smell. He is able to bear weight on his right foot and does not have any problems walking. He has peripheral neuropathy so he has decreased sensation in his b/l LE, but no tingling. Denies fever, chills, chest pain, palpitations, SOB, cough, abdominal pain, n/v/d/c, dysuria, hematuria, changes in vision, dizziness,       ED course:  Vitals: /74, HR 84, T 98.2, RR 16 SpO2 100% on RA   Labs: H/H 12.6/38.8, BUN/Cr 25/1.2  Given: IV Zosyn x1, IV Vancomycin 1g x1, 1L NaCl bolus x1   EKbpm, QTc 418, Afib      Imaging  Cxray: No acute finding or change.  Right Foot Xray:  Status post resection of the fifth ray at the level of the distal metatarsal and proximal phalanx. Status post resection of the second and third digits. No cortical erosion or periosteal reaction. There is a plantar calcaneal enthesophyte. Strength is present within the medial navicular.   (20 May 2024 12:52)      PAST MEDICAL & SURGICAL HISTORY:  Hypertension  Hyperlipidemia  Diabetes mellitus  Type 1 on insulin pump  CKD (chronic kidney disease)  Renal Transplant  at [facility]  Diabetic peripheral neuropathy  Obesity (BMI 30-39.9)  CVA (cerebral vascular accident)  Wallenberg Stroke:low L body sensation; low R face sensation; decreased peripheral vision; poor balance   Squamous cell carcinoma of skin of left ear/nose  History of DVT (deep vein thrombosis)  LLE , was on Eliquis x 6 months  Was hospitalized for Rhinovirus at the time, intubated  Recurrent squamous cell carcinoma of skin  nose, L arm  History of pleural effusion  right thoracentesis 2022 and 2022  CARMEN treated with BiPAP  2L O2 at night  History of restrictive lung disease  Toe infection   L 4th Toe surgery-amputation secondary to osteomyelitis  2019 partial right 2nd 4th toe amputation  Ear disease  Left inner ear surgery, 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, Can NOT have MRI  Vasectomy status  s/p b/l  vasectomy  H/O foot surgery   - right foot - bone fracture - s/p ORIF and subsequent removal of hardware  End-stage renal failure with renal transplant  2013  S/P kidney transplant    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  History of loop recorder        REVIEW OF SYSTEMS-as above, otherwise negative  General:	    Skin/Breast:  	  Ophthalmologic:  	  ENMT:	    Respiratory and Thorax:  	  Cardiovascular:	    Gastrointestinal:	    Genitourinary:	    Musculoskeletal:	    Neurological:	    Psychiatric:	    Hematology/Lymphatics:	    Endocrine:	    Allergic/Immunologic:	    MEDICATIONS  (STANDING):  aspirin  chewable 81 milliGRAM(s) Oral daily  atorvastatin 10 milliGRAM(s) Oral at bedtime  azaTHIOprine 100 milliGRAM(s) Oral daily  carvedilol 12.5 milliGRAM(s) Oral every 12 hours  cefepime   IVPB 2000 milliGRAM(s) IV Intermittent every 8 hours  cloNIDine 0.2 milliGRAM(s) Oral three times a day  gabapentin 300 milliGRAM(s) Oral two times a day  heparin   Injectable 18798 Unit(s) IV Push once  heparin  Infusion.  Unit(s)/Hr (24 mL/Hr) IV Continuous <Continuous>  hydrALAZINE 100 milliGRAM(s) Oral three times a day  losartan 25 milliGRAM(s) Oral at bedtime  NIFEdipine XL 30 milliGRAM(s) Oral two times a day  tacrolimus 1.5 milliGRAM(s) Oral <User Schedule>  trimethoprim   80 mG/sulfamethoxazole 400 mG 1 Tablet(s) Oral daily    MEDICATIONS  (PRN):  acetaminophen     Tablet .. 650 milliGRAM(s) Oral every 6 hours PRN Temp greater or equal to 38C (100.4F), Mild Pain (1 - 3)  aluminum hydroxide/magnesium hydroxide/simethicone Suspension 30 milliLiter(s) Oral every 4 hours PRN Dyspepsia  heparin   Injectable 5000 Unit(s) IV Push every 6 hours PRN For aPTT between 40 - 57  heparin   Injectable 53471 Unit(s) IV Push every 6 hours PRN For aPTT less than 40  melatonin 3 milliGRAM(s) Oral at bedtime PRN Insomnia  ondansetron Injectable 4 milliGRAM(s) IV Push every 8 hours PRN Nausea and/or Vomiting      Allergies  No Known Allergies    SOCIAL HISTORY: ; independent ambulator; nonsmoker; no ETOH    Vital Signs Last 24 Hrs  T(C): 36.4 (20 May 2024 14:07), Max: 36.8 (20 May 2024 09:15)  T(F): 97.6 (20 May 2024 14:07), Max: 98.2 (20 May 2024 09:15)  HR: 67 (20 May 2024 14:07) (67 - 84)  BP: 118/71 (20 May 2024 14:07) (118/71 - 133/74)  BP(mean): --  RR: 16 (20 May 2024 14:07) (16 - 16)  SpO2: 94% (20 May 2024 14:07) (94% - 100%)    Parameters below as of 20 May 2024 14:07  Patient On (Oxygen Delivery Method): room air        PHYSICAL EXAM:  Constitutional: WD M in NAD  Eyes: PERRL; EOMI  ENMT: WNL  Neck: No JVD  Respiratory: CTA diminished at R base  Cardiovascular: normal S1, S2  Gastrointestinal: soft, NR, NT  Extremities: RLE with 1+ edema; Lat R foot wound with dressing CDI  Neurological: A& O x 3 BOO X 4 =  Pulses:   Right:                                                                          Left:  FEM [ x]2+ [ ]1+ [ ]doppler                                             FEM [x ]2+ [ ]1+ [ ]doppler    POP [ ]2+ [x ]1+ [ ]doppler                                             POP [ ]2+ [x ]1+ [ ]doppler    DP [ ]2+ [ ]1+ [ x]doppler                                                DP [ ]2+ [ x]1+ [ ]doppler  PT[ ]2+ [ ]x1+ [ ]doppler                                                  PT [ ]2+ [x ]1+ [ ]doppler      LABS:                        12.6   5.80  )-----------( 204      ( 20 May 2024 10:10 )             38.8     05-20    139  |  108  |  25<H>  ----------------------------<  161<H>  3.9   |  29  |  1.20    Ca    8.8      20 May 2024 10:10    TPro  6.9  /  Alb  3.2<L>  /  TBili  0.5  /  DBili  x   /  AST  13<L>  /  ALT  19  /  AlkPhos  93  05-20    PT/INR - ( 20 May 2024 10:10 )   PT: 10.8 sec;   INR: 0.92 ratio    PTT - ( 20 May 2024 10:10 )  PTT:28.4 sec    Urinalysis Basic - ( 20 May 2024 10:10 )    Color: x / Appearance: x / SG: x / pH: x  Gluc: 161 mg/dL / Ketone: x  / Bili: x / Urobili: x   Blood: x / Protein: x / Nitrite: x   Leuk Esterase: x / RBC: x / WBC x   Sq Epi: x / Non Sq Epi: x / Bacteria: x        RADIOLOGY & ADDITIONAL STUDIES

## 2024-05-20 NOTE — H&P ADULT - NSICDXPASTMEDICALHX_GEN_ALL_CORE_FT
PAST MEDICAL HISTORY:  CKD (chronic kidney disease) Renal Transplant 2013 at [facility]    CVA (cerebral vascular accident) Wallenberg Stroke  low L body sensation  low R face sensation  decreased peripheral vision  poor balance  2015      Diabetes mellitus Type 1 on insulin pump    Diabetic peripheral neuropathy     History of DVT (deep vein thrombosis) LLE 2016, was on Eliquis x 6 months  Was hospitalized for Rhinovirus at the time, intubated    History of pleural effusion right thoracentesis 6/2022 and 8/2022    History of restrictive lung disease     Hyperlipidemia     Hypertension     Obesity (BMI 30-39.9)     CARMEN treated with BiPAP 2L O2 at night    Recurrent squamous cell carcinoma of skin nose, L arm    Squamous cell carcinoma of skin of left ear nose

## 2024-05-20 NOTE — CONSULT NOTE ADULT - SUBJECTIVE AND OBJECTIVE BOX
HPI:  Patient jon 59yo M with a PMH of type 1 diabetes mellitus, CVA, recurrent right pleural effusion with VATS, renal transplant sent in from wound care for a non healing right foot wound.     Denies fever, chills, chest pain, palpitations, SOB, cough, abdominal pain, nausea, vomiting, diarrhea, constipation, hematochezia, melena, urinary frequency, urgency, dysuria, hematuria, headaches, changes in vision, dizziness, numbness, tingling. No other complaints at this time.    Denies recent travel, recent antibiotic use, or sick contacts.    ED course:  Vitals: /74, HR 84, T 98.2, RR 16 SpO2 100% on RA   Labs: H/H 12.6/38.8, BUN/Cr 25/1.2  Given: IV Zosyn x1, IV Vancomycin 1g x1, 1L NaCl bolus x1   EKbpm, QTc 418, Afib      Imaging  Cxray: No acute finding or change.  Right Foot Xray:  Status post resection of the fifth ray at the level of the distal metatarsal and proximal phalanx. Status post resection of the second and third digits. No cortical erosion or periosteal reaction. There is a plantar calcaneal enthesophyte. Strength is present within the medial navicular.   (20 May 2024 12:52)      58y year old Male seen at [facility] 07 for right foot chronic non healing wound. Patient has had hx of previous surgical intervention and has recurrence of the wound. Patient is known to the wound care team and will need surgical intervention,   Denies any fever, chills, nausea, vomiting, chest pain, shortness of breath, or calf pain at this time.    REVIEW OF SYSTEMS    PAST MEDICAL & SURGICAL HISTORY:  Hypertension      Hyperlipidemia      Diabetes mellitus  Type 1 on insulin pump      CKD (chronic kidney disease)  Renal Transplant  at [facility]      Diabetic peripheral neuropathy      Obesity (BMI 30-39.9)      CVA (cerebral vascular accident)  Wallenberg Stroke  low L body sensation  low R face sensation  decreased peripheral vision  poor balance          Squamous cell carcinoma of skin of left ear  nose      History of DVT (deep vein thrombosis)  LLE , was on Eliquis x 6 months  Was hospitalized for Rhinovirus at the time, intubated      Recurrent squamous cell carcinoma of skin  nose, L arm      History of pleural effusion  right thoracentesis 2022 and 2022      CARMEN treated with BiPAP  2L O2 at night      History of restrictive lung disease      Toe infection   L 4th Toe surgery-amputation secondary to osteomyelitis  2019 partial right 2nd 4th toe amputation      Ear disease  Left inner ear surgery, 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, Can NOT have MRI      Vasectomy status  s/p b/l  vasectomy      H/O foot surgery   - right foot - bone fracture - s/p ORIF and subsequent removal of hardware      End-stage renal failure with renal transplant  2013      S/P kidney transplant        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      History of loop recorder            Allergies    No Known Allergies    Intolerances        MEDICATIONS  (STANDING):    MEDICATIONS  (PRN):      Social History:      FAMILY HISTORY:  Family history of diabetes mellitus (DM)    FH: skin cancer        Vital Signs Last 24 Hrs  T(C): 36.8 (20 May 2024 09:15), Max: 36.8 (20 May 2024 09:15)  T(F): 98.2 (20 May 2024 09:15), Max: 98.2 (20 May 2024 09:15)  HR: 84 (20 May 2024 09:15) (84 - 84)  BP: 133/74 (20 May 2024 09:15) (133/74 - 133/74)  BP(mean): --  RR: 16 (20 May 2024 09:15) (16 - 16)  SpO2: 100% (20 May 2024 09:15) (100% - 100%)    Parameters below as of 20 May 2024 09:15  Patient On (Oxygen Delivery Method): room air        PHYSICAL EXAM:  Vascular: DP & PT palpable bilaterally, Capillary refill 3 seconds, Digital hair absent bilaterally  Neurological: Light touch sensation diminished b/l   Musculoskeletal: 5/5 strength in all quadrants bilaterally, AJ & STJ ROM intact  Dermatological: 2.0cm x 1.0cm x down to bone with tracking noted to the wound ,(+)  probe to bone, mild  periwound erythema, no fluctuance, no malodor, no proximal streaking at this time    CBC Full  -  ( 20 May 2024 10:10 )  WBC Count : 5.80 K/uL  RBC Count : 4.25 M/uL  Hemoglobin : 12.6 g/dL  Hematocrit : 38.8 %  Platelet Count - Automated : 204 K/uL  Mean Cell Volume : 91.3 fl  Mean Cell Hemoglobin : 29.6 pg  Mean Cell Hemoglobin Concentration : 32.5 gm/dL  Auto Neutrophil # : 3.94 K/uL  Auto Lymphocyte # : 1.00 K/uL  Auto Monocyte # : 0.56 K/uL  Auto Eosinophil # : 0.22 K/uL  Auto Basophil # : 0.07 K/uL  Auto Neutrophil % : 67.9 %  Auto Lymphocyte % : 17.2 %  Auto Monocyte % : 9.7 %  Auto Eosinophil % : 3.8 %  Auto Basophil % : 1.2 %        139  |  108  |  25<H>  ----------------------------<  161<H>  3.9   |  29  |  1.20    Ca    8.8      20 May 2024 10:10    TPro  6.9  /  Alb  3.2<L>  /  TBili  0.5  /  DBili  x   /  AST  13<L>  /  ALT  19  /  AlkPhos  93                              12.6   5.80  )-----------( 204      ( 20 May 2024 10:10 )             38.8     PT/INR - ( 20 May 2024 10:10 )   PT: 10.8 sec;   INR: 0.92 ratio         PTT - ( 20 May 2024 10:10 )  PTT:28.4 sec        Imaging: ----------

## 2024-05-20 NOTE — H&P ADULT - PROBLEM SELECTOR PLAN 5
Chronic   - /74 on admission  - Continue home Carvedilol 12.5mg BID   - Continue home Clonidine 0.2mg TID  - Continue home Nifedipine 30mg BID  - Continue home Hydralazine 100mg TID  - Continue home Losartan 25mg at bedtime  - Monitor routine hemodynamics   - Cardio consulted, f/u recs

## 2024-05-20 NOTE — CONSULT NOTE ADULT - SUBJECTIVE AND OBJECTIVE BOX
DOCUMENTATION IN PROGRESS      CHIEF COMPLAINT: Patient is a 58y old  Male who presents with a chief complaint of Right foot wound (20 May 2024 13:00)    HPI:  Patient is a 57yo M with a PMH of type 1 diabetes mellitus, HTN, HLD, CVA (), prior DVT (s/p Eliquis), recurrent right pleural effusion with VATS, renal transplant (), sent in from wound care for a non healing right foot wound. Patient has been following with outpatient wound care for the non healing wound on his right foot, and has noted that it has progressively gotten worse. He was sent in for a right  foot exostectomy for tomorrow 24 with Dr. Catherine. Patient notes that there has been a small amount of bloody drainage from the wound, but denies any pain, pus or foul smell. He is able to bear weight on his right foot and does not have any problems walking. He has peripheral neuropathy so he has decreased sensation in his b/l LE, but no tingling. Denies fever, chills, chest pain, palpitations, SOB, cough, abdominal pain, n/v/d/c, dysuria, hematuria, changes in vision, dizziness,       ED course:  Vitals: /74, HR 84, T 98.2, RR 16 SpO2 100% on RA   Labs: H/H 12.6/38.8, BUN/Cr 25/1.2  Given: IV Zosyn x1, IV Vancomycin 1g x1, 1L NaCl bolus x1   EKbpm, QTc 418, Afib      Imaging  Cxray: No acute finding or change.  Right Foot Xray:  Status post resection of the fifth ray at the level of the distal metatarsal and proximal phalanx. Status post resection of the second and third digits. No cortical erosion or periosteal reaction. There is a plantar calcaneal enthesophyte. Strength is present within the medial navicular.   (20 May 2024 12:52)      EKG: Afib 60 bpm     REVIEW OF SYSTEMS:   All other review of systems are negative unless indicated above    PAST MEDICAL & SURGICAL HISTORY:  Hypertension      Hyperlipidemia      Diabetes mellitus  Type 1 on insulin pump      CKD (chronic kidney disease)  Renal Transplant  at [facility]      Diabetic peripheral neuropathy      Obesity (BMI 30-39.9)      CVA (cerebral vascular accident)  Wallenberg Stroke  low L body sensation  low R face sensation  decreased peripheral vision  poor balance          Squamous cell carcinoma of skin of left ear  nose      History of DVT (deep vein thrombosis)  LLE , was on Eliquis x 6 months  Was hospitalized for Rhinovirus at the time, intubated      Recurrent squamous cell carcinoma of skin  nose, L arm      History of pleural effusion  right thoracentesis 2022 and 2022      CARMEN treated with BiPAP  2L O2 at night      History of restrictive lung disease      Toe infection   L 4th Toe surgery-amputation secondary to osteomyelitis   partial right 2nd 4th toe amputation      Ear disease  Left inner ear surgery, 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    FAMILY HISTORY:  Family history of diabetes mellitus (DM)    FH: skin cancer      No family history of acute MI or sudden cardiac death.    MEDICATIONS  (STANDING):    MEDICATIONS  (PRN):      Allergies    No Known Allergies    Intolerances        Home meds:  Home Medications:  aspirin 81 mg oral tablet: 1 tab(s) orally once a day (2023 15:04)  azaTHIOprine 100 mg oral tablet: 1 tab(s) orally once a day, am (2023 15:04)  Bactrim 400 mg-80 mg oral tablet: 1 tab(s) orally once a day, am (2023 15:04)  cloNIDine 0.1 mg oral tablet: 1 tab(s) orally 2 times a day (2023 15:04)  gabapentin 300 mg oral capsule: 1 tab(s) orally 2 times a day (2023 15:04)  Humalog pump: 3 unit(s) per hour 24hrs/day with 3 bolus with meals.  (2023 15:04)  hydrALAZINE 100 mg oral tablet: 1 tab(s) orally 3 times a day (2023 15:04)  lovastatin 40 mg oral tablet: 1 tab(s) orally once a day, pm (2023 15:04)  metoprolol tartrate 50 mg oral tablet: 1 tab(s) orally 2 times a day (2023 15:04)  tacrolimus: 1.5 milligram(s) orally 2 times a  (9am, 9pm)- *strictly timed* (2023 15:04)        VITAL SIGNS:   Vital Signs Last 24 Hrs  T(C): 36.8 (20 May 2024 09:15), Max: 36.8 (20 May 2024 09:15)  T(F): 98.2 (20 May 2024 09:15), Max: 98.2 (20 May 2024 09:15)  HR: 84 (20 May 2024 09:15) (84 - 84)  BP: 133/74 (20 May 2024 09:15) (133/74 - 133/74)  BP(mean): --  RR: 16 (20 May 2024 09:15) (16 - 16)  SpO2: 100% (20 May 2024 09:15) (100% - 100%)    Parameters below as of 20 May 2024 09:15  Patient On (Oxygen Delivery Method): room air        I&O's Summary      On Exam:  TELE:   Constitutional: NAD, awake and alert  HEENT: Moist Mucous Membranes, Anicteric  Pulmonary: Non-labored, breath sounds are clear bilaterally, No wheezing, rales or rhonchi  Cardiovascular: Regular, S1 and S2, No murmurs, rubs, gallops or clicks  Gastrointestinal: Bowel Sounds present, soft, nontender.   Lymph: No peripheral edema. No lymphadenopathy.  Skin: No visible rashes or ulcers.  Psych:  Mood & affect appropriate for situation    LABS: All Labs Reviewed:                        12.6   5.80  )-----------( 204      ( 20 May 2024 10:10 )             38.8     20 May 2024 10:10    139    |  108    |  25     ----------------------------<  161    3.9     |  29     |  1.20     Ca    8.8        20 May 2024 10:10    TPro  6.9    /  Alb  3.2    /  TBili  0.5    /  DBili  x      /  AST  13     /  ALT  19     /  AlkPhos  93     20 May 2024 10:10    PT/INR - ( 20 May 2024 10:10 )   PT: 10.8 sec;   INR: 0.92 ratio         PTT - ( 20 May 2024 10:10 )  PTT:28.4 sec     CHIEF COMPLAINT: Patient is a 58y old  Male who presents with a chief complaint of Right foot wound (20 May 2024 13:00)    HPI:  Patient is a 59yo M with a PMH of type 1 diabetes mellitus, HTN, HLD, CVA (), prior DVT (s/p Eliquis), recurrent right pleural effusion with VATS, renal transplant (), sent in from wound care for a non healing right foot wound. Patient has been following with outpatient wound care for the non healing wound on his right foot, and has noted that it has progressively gotten worse. He was sent in for a right  foot exostectomy for tomorrow 24 with Dr. Catherine. Patient notes that there has been a small amount of bloody drainage from the wound, but denies any pain, pus or foul smell. He is able to bear weight on his right foot and does not have any problems walking. He has peripheral neuropathy so he has decreased sensation in his b/l LE, but no tingling. Denies fever, chills, chest pain, palpitations, SOB, cough, abdominal pain, n/v/d/c, dysuria, hematuria, changes in vision, dizziness,       ED course:  Vitals: /74, HR 84, T 98.2, RR 16 SpO2 100% on RA   Labs: H/H 12.6/38.8, BUN/Cr 25/1.2  Given: IV Zosyn x1, IV Vancomycin 1g x1, 1L NaCl bolus x1   EKbpm, QTc 418, Afib      Imaging  Cxray: No acute finding or change.  Right Foot Xray:  Status post resection of the fifth ray at the level of the distal metatarsal and proximal phalanx. Status post resection of the second and third digits. No cortical erosion or periosteal reaction. There is a plantar calcaneal enthesophyte. Strength is present within the medial navicular.   (20 May 2024 12:52)      EKG: Afib 60 bpm     REVIEW OF SYSTEMS:   All other review of systems are negative unless indicated above    PAST MEDICAL & SURGICAL HISTORY:  Hypertension      Hyperlipidemia      Diabetes mellitus  Type 1 on insulin pump      CKD (chronic kidney disease)  Renal Transplant  at [facility]      Diabetic peripheral neuropathy      Obesity (BMI 30-39.9)      CVA (cerebral vascular accident)  Wallenberg Stroke  low L body sensation  low R face sensation  decreased peripheral vision  poor balance          Squamous cell carcinoma of skin of left ear  nose      History of DVT (deep vein thrombosis)  LLE , was on Eliquis x 6 months  Was hospitalized for Rhinovirus at the time, intubated      Recurrent squamous cell carcinoma of skin  nose, L arm      History of pleural effusion  right thoracentesis 2022 and 2022      CARMEN treated with BiPAP  2L O2 at night      History of restrictive lung disease      Toe infection   L 4th Toe surgery-amputation secondary to osteomyelitis  2019 partial right 2nd 4th toe amputation      Ear disease  Left inner ear surgery, 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    FAMILY HISTORY:  Family history of diabetes mellitus (DM)    FH: skin cancer      No family history of acute MI or sudden cardiac death.    MEDICATIONS  (STANDING):    MEDICATIONS  (PRN):      Allergies    No Known Allergies    Intolerances        Home meds:  Home Medications:  aspirin 81 mg oral tablet: 1 tab(s) orally once a day (2023 15:04)  azaTHIOprine 100 mg oral tablet: 1 tab(s) orally once a day, am (2023 15:04)  Bactrim 400 mg-80 mg oral tablet: 1 tab(s) orally once a day, am (2023 15:04)  cloNIDine 0.1 mg oral tablet: 1 tab(s) orally 2 times a day (2023 15:04)  gabapentin 300 mg oral capsule: 1 tab(s) orally 2 times a day (2023 15:04)  Humalog pump: 3 unit(s) per hour 24hrs/day with 3 bolus with meals.  (2023 15:04)  hydrALAZINE 100 mg oral tablet: 1 tab(s) orally 3 times a day (2023 15:04)  lovastatin 40 mg oral tablet: 1 tab(s) orally once a day, pm (2023 15:04)  metoprolol tartrate 50 mg oral tablet: 1 tab(s) orally 2 times a day (2023 15:04)  tacrolimus: 1.5 milligram(s) orally 2 times a  (9am, 9pm)- *strictly timed* (2023 15:04)        VITAL SIGNS:   Vital Signs Last 24 Hrs  T(C): 36.8 (20 May 2024 09:15), Max: 36.8 (20 May 2024 09:15)  T(F): 98.2 (20 May 2024 09:15), Max: 98.2 (20 May 2024 09:15)  HR: 84 (20 May 2024 09:15) (84 - 84)  BP: 133/74 (20 May 2024 09:15) (133/74 - 133/74)  BP(mean): --  RR: 16 (20 May 2024 09:15) (16 - 16)  SpO2: 100% (20 May 2024 09:15) (100% - 100%)    Parameters below as of 20 May 2024 09:15  Patient On (Oxygen Delivery Method): room air        I&O's Summary      On Exam:  TELE: Not on telemetry   Constitutional: NAD, awake and alert  HEENT: Moist Mucous Membranes, Anicteric  Pulmonary: Non-labored, breath sounds are clear bilaterally, No wheezing, rales or rhonchi  Cardiovascular: Regular, S1 and S2, No murmurs, rubs, gallops or clicks  Gastrointestinal: Bowel Sounds present, soft, nontender.   Lymph: + b/l  peripheral edema. No lymphadenopathy.  Skin: RLE wound with dressing intact  Psych:  Mood & affect appropriate for situation    LABS: All Labs Reviewed:                        12.6   5.80  )-----------( 204      ( 20 May 2024 10:10 )             38.8     20 May 2024 10:10    139    |  108    |  25     ----------------------------<  161    3.9     |  29     |  1.20     Ca    8.8        20 May 2024 10:10    TPro  6.9    /  Alb  3.2    /  TBili  0.5    /  DBili  x      /  AST  13     /  ALT  19     /  AlkPhos  93     20 May 2024 10:10    PT/INR - ( 20 May 2024 10:10 )   PT: 10.8 sec;   INR: 0.92 ratio         PTT - ( 20 May 2024 10:10 )  PTT:28.4 sec

## 2024-05-20 NOTE — H&P ADULT - HISTORY OF PRESENT ILLNESS
Patient jon 59yo M with a PMH of type 1 diabetes mellitus, CVA, recurrent right pleural effusion with VATS, renal transplant sent in from wound care for a non healing right foot wound.     Denies fever, chills, chest pain, palpitations, SOB, cough, abdominal pain, nausea, vomiting, diarrhea, constipation, hematochezia, melena, urinary frequency, urgency, dysuria, hematuria, headaches, changes in vision, dizziness, numbness, tingling. No other complaints at this time.    Denies recent travel, recent antibiotic use, or sick contacts.    ED course:  Vitals: /74, HR 84, T 98.2, RR 16 SpO2 100% on RA   Labs: H/H 12.6/38.8, BUN/Cr 25/1.2  Given: IV Zosyn x1, IV Vancomycin 1g x1, 1L NaCl bolus x1   EKbpm, QTc 418, Afib      Imaging  Cxray: No acute finding or change.  Right Foot Xray:  Status post resection of the fifth ray at the level of the distal metatarsal and proximal phalanx. Status post resection of the second and third digits. No cortical erosion or periosteal reaction. There is a plantar calcaneal enthesophyte. Strength is present within the medial navicular.   Patient is a 57yo M with a PMH of type 1 diabetes mellitus, HTN, HLD, CVA (), prior DVT (s/p Eliquis), recurrent right pleural effusion with VATS, renal transplant (), sent in from wound care for a non healing right foot wound. Patient has been following with outpatient wound care for the non healing wound on his right foot, and has noted that it has progressively gotten worse. He was sent in for a right  foot exostectomy for tomorrow 24 with Dr. Catherine. Patient notes that there has been a small amount of bloody drainage from the wound, but denies any pain, pus or foul smell. He is able to bear weight on his right foot and does not have any problems walking. He has peripheral neuropathy so he has decreased sensation in his b/l LE, but no tingling. Denies fever, chills, chest pain, palpitations, SOB, cough, abdominal pain, n/v/d/c, dysuria, hematuria, changes in vision, dizziness,       ED course:  Vitals: /74, HR 84, T 98.2, RR 16 SpO2 100% on RA   Labs: H/H 12.6/38.8, BUN/Cr 25/1.2  Given: IV Zosyn x1, IV Vancomycin 1g x1, 1L NaCl bolus x1   EKbpm, QTc 418, Afib      Imaging  Cxray: No acute finding or change.  Right Foot Xray:  Status post resection of the fifth ray at the level of the distal metatarsal and proximal phalanx. Status post resection of the second and third digits. No cortical erosion or periosteal reaction. There is a plantar calcaneal enthesophyte. Strength is present within the medial navicular.

## 2024-05-20 NOTE — H&P ADULT - NSHPSOCIALHISTORY_GEN_ALL_CORE
Lives: At home with family   ADLs: Fully independent   Ambulation: No problems walking   Alcohol Use: none   Tobacco Use: none   Recreational Drug Use: none

## 2024-05-20 NOTE — H&P ADULT - ASSESSMENT
Patient is a 59yo M with a PMH of type 1 diabetes mellitus, HTN, HLD, CVA (2015), prior DVT (s/p Eliquis), recurrent right pleural effusion with VATS, renal transplant (2013), sent in from wound care for a non healing right foot wound.

## 2024-05-20 NOTE — CONSULT NOTE ADULT - ASSESSMENT
59yo M with a PMH of type 1 diabetes mellitus, HTN, HLD, CVA (2015), prior DVT (s/p Eliquis), recurrent right pleural effusion with VATS, renal transplant (2013), sent in from wound care for a non healing right foot wound.      57yo M with a PMH of type 1 diabetes mellitus, HTN, HLD, CVA (2015), prior DVT (s/p Eliquis ~ 9 years ago), recurrent right pleural effusion with VATS, renal transplant (2013), sent in from wound care for a non healing right foot wound.     Cardiac optimization, New onset Afib   - presented from wound care center for non healing right foot wound, planned for right foot exostectomy with Dr. Catherine, in AM (5/21/24)    - EKG : Afib 60 bpm, no acute ischemia noted, neew from prior read  - no anginal complaints  - continue ASA and statin     - New onset Afib, unknown duration  - CHADVasc: 5   - would start hep gtt (Full AC protocol) for thromboembolic prevention, can hold prior to surgery   - would monitor on tele  - BP controlled (hx of longstanding HTN)  - would continue home anti hypertensives on: Coreg, Clonidine, hydralazine, losartan, nifedipine   - Cardiac enzymes q8h x2 to r/o ischemia.  - Monitor and replete Lytes. Keep K > 4 and Mg > 2    - Chest X-ray negative for acute process  - TTE: (2022)normal EF 65  - obtain routine TTE to evaluate function and to r/o thrombus  - has chronic b/l LE edema, otherwise no sign of volume overload on exam    - Pt has no active ischemia, decompensated heart failure, unstable arrythmia, or severe stenotic valvular disease. Patient is optimized from cardiovascular standpoint to proceed with planned procedure with routine hemodynamic monitoring.     - Will continue to follow.    [facility] Montes [facility]  Nurse Practitioner - Cardiology   call TEAMS             57yo M with a PMH of type 1 diabetes mellitus, HTN, HLD, CVA (2015), prior DVT (s/p Eliquis ~ 9 years ago), recurrent right pleural effusion with VATS, renal transplant (2013), sent in from wound care for a non healing right foot wound.     Cardiac optimization, New onset Afib   - presented from wound care center for non healing right foot wound, planned for right foot exostectomy with Dr. Catherine, in AM (5/21/24)    - EKG : Afib 60 bpm, no acute ischemia noted, new from prior read  - no anginal complaints  - continue ASA and statin   - New onset Afib, unknown duration  - CHADVasc: 5   - would start hep gtt (Full AC protocol) for thromboembolic prevention, can hold prior to surgery   - would monitor on tele  - BP controlled (hx of longstanding HTN)  - would continue home anti hypertensives as tolerated by blood pressure: Coreg, Clonidine, hydralazine, losartan, nifedipine   - Monitor and replete Lytes. Keep K > 4 and Mg > 2    - Chest X-ray negative for acute process  - TTE: (2022)normal EF 65  - obtain routine TTE to evaluate function and to r/o thrombus  - has chronic b/l LE edema, otherwise no sign of volume overload on exam    - Pt has no active ischemia, decompensated heart failure, unstable arrythmia, or severe stenotic valvular disease. Patient is optimized from cardiovascular standpoint to proceed with planned procedure with routine hemodynamic monitoring.     - Will continue to follow.    Flor Montes [facility]  Nurse Practitioner - Cardiology   call TEAMS

## 2024-05-20 NOTE — CONSULT NOTE ADULT - ASSESSMENT
Patient is a 57yo M with a PMH of type 1 diabetes mellitus, HTN, HLD, CVA (2015), prior DVT (s/p Eliquis), recurrent right pleural effusion with VATS, renal transplant (2013), sent in from wound care for a non healing right foot wound.     R foot wound  - Right Foot Xray:  Status post resection of the fifth ray at the level of the distal metatarsal and proximal phalanx. Status post resection of the second and third digits. No cortical erosion or periosteal reaction. There is a plantar calcaneal enthesophyte. Strength is present within the medial navicular.  - S/p IV Zosyn x1 and IV Vanco 1g x1    Hx of renal txp in 2013    Recommendations:  C/w IV Cefepime 2g q8hrs for osteomyelitis coverage   F/u pending cx  Trend temps/WBC  Appreciate podiatry recs  --c/w wound care  --Scheduled for right foot exostectomy tomorrow 5/21/24 with Dr. Catherine  Additional care per primary team    D/w Dr. Castro  Infectious Diseases will follow. Please call with any questions.  Claire Castro M.D.  [facility] Division of Infectious Diseases [phone]  For after 5 P.M. and weekends, please call [phone]

## 2024-05-20 NOTE — ED ADULT NURSE NOTE - OBJECTIVE STATEMENT
sent by wound care of admission and surgery on R foot wound. pt denies pain, fevers, chills. able to ambulate. hx of stroke with left sided weakness and right sided facial droop. A&Ox4

## 2024-05-20 NOTE — ED ADULT NURSE NOTE - NSFALLHARMRISKINTERV_ED_ALL_ED

## 2024-05-20 NOTE — PATIENT PROFILE ADULT - DO YOU LACK THE NECESSARY SUPPORT TO HELP YOU COPE WITH LIFE CHALLENGES?
Patient says is always elevated in the doctor's office  160/80 on repeat  Recommended to keep a log at home  Follow-up in 1 week with log and see if blood pressures have improved.   May have to increase management of blood pressure no

## 2024-05-20 NOTE — H&P ADULT - NSHPOUTPATIENTPROVIDERS_GEN_ALL_CORE
Podiatry: DR. Campbell   Cardio: Dr. Leticia Zhao: Dr. Chi   PCP: Dr. Miranda Podiatry: DR. Campbell   Wound Care: Dr. Catherine   Cardio: Dr. Kelly   [place]: Dr. Chi   PCP: Dr. Miranda

## 2024-05-20 NOTE — CONSULT NOTE ADULT - NS ATTEND AMEND GEN_ALL_CORE FT
59yo M with a PMH of type 1 diabetes mellitus, HTN, HLD, CVA (2015), prior DVT (s/p Eliquis ~ 9 years ago), recurrent right pleural effusion with VATS, renal transplant (2013), sent in from wound care for a non healing right foot wound.     History of CVA with prior ILR placement, which is now dead.   now found to be in Afib, unknown duration.   Last TTE in 2022 with normal LV function  Presenting with non healing foot wound, scheduled for right foot exostectomy.   AF is rate controlled, would continue home BB through the periop period.   Will need initiation of AC given elevated CHADSVASc. To be initiated on heparin and be held prior to the OR as per surgery.   Needs updated TTE, to be done today.   Will continue to follow closely. 59yo M with a PMH of type 1 diabetes mellitus, HTN, HLD, CVA (2015), prior DVT (s/p Eliquis ~ 9 years ago), recurrent right pleural effusion with VATS, renal transplant (2013), sent in from wound care for a non healing right foot wound.     History of CVA with prior ILR placement, which is now dead.   now found to be in Afib, unknown duration.   Last TTE in 2022 with normal LV function  Presenting with non healing foot wound, scheduled for right foot exostectomy.   AF is rate controlled, would continue home BB through the periop period.   Will need initiation of AC given elevated CHADSVASc. To be initiated on heparin and be held prior to the OR as per surgery.   Needs updated TTE, to be done today.   Needs TFTs for new onset AF  Will continue to follow closely.

## 2024-05-20 NOTE — H&P ADULT - PROBLEM SELECTOR PLAN 3
Type 1 Diabetes w/ Insulin Pump and CGM. Insulin pump is replenished every 3 days. Follows Ramo Russell.   - Insulin pump last changed Sunday 5/19 --> needs to be replenished Tuesday night 5/21  - Insulin pump gives continuous 3u Insulin every hour  - Gets additional 5-10u insulin premeal   - Monitor fingersticks   - Consistent carb diet  - NPO after midnight   - Hypoglycemia protocol   - F/u AM A1c   - Continue home Gabapentin 300mg BID for diabetic neuropathy   - Endo consulted, f/u recs Type 1 Diabetes w/ Insulin Pump and CGM. Insulin pump is replenished every 3 days. Follows Ramo Russell.   - Insulin pump last changed Sunday 5/19 --> needs to be replenished Tuesday night 5/21  - Insulin pump gives continuous 3u Insulin every hour  - Gets additional 5-10u insulin premeal   - Monitor fingersticks   - Consistent carb diet  - NPO after midnight   - Hypoglycemia protocol   - F/u AM A1c   - Continue home Gabapentin 300mg BID for diabetic neuropathy   - Ramo consulted/Pat Weil Consulted also, f/u recs

## 2024-05-20 NOTE — ED ADULT NURSE NOTE - NSICDXPASTMEDICALHX_GEN_ALL_CORE_FT
MST consult triggered secondary to unsure weight loss secondary to pt being confused. Pt seen in bed resting this AM, b/l mitts in place. Noted to be A&Ox1-2. No family at bedside this AM, unable to obtain hx from pt. RD will re-attempt at follow up if feasible. Pt started on regular diet this AM, previously NPO. Pt appears with mild/moderate muscle wasting of clavicle/shoulder region, insufficient parameters to identify malnutrition at this time, pt remains at risk. Current weight 185 lbs, no further weight hx per chart review. RD to follow up as feasible, remains available.  PAST MEDICAL HISTORY:  CKD (chronic kidney disease) Renal Transplant 2013 at [facility]    CVA (cerebral vascular accident) [place] Stroke  low L body sensation  low R face sensation  decreased peripheral vision  poor balance  2015      Diabetes mellitus Type 1 on insulin pump    Diabetic peripheral neuropathy     History of DVT (deep vein thrombosis) LLE 2016, was on Eliquis x 6 months  Was hospitalized for Rhinovirus at the time, intubated    History of pleural effusion right thoracentesis 6/2022 and 8/2022    History of restrictive lung disease     Hyperlipidemia     Hypertension     Obesity (BMI 30-39.9)     CARMEN treated with BiPAP 2L O2 at night    Recurrent squamous cell carcinoma of skin nose, L arm    Squamous cell carcinoma of skin of left ear nose

## 2024-05-20 NOTE — ED ADULT NURSE REASSESSMENT NOTE - NS ED NURSE REASSESS COMMENT FT1
pt reports having insulin pump and CGM. pt given attestation form and self assessment. escalated to charge RN. diabetic educator informed.

## 2024-05-20 NOTE — CONSULT NOTE ADULT - ASSESSMENT
59 y/o M with Type 1 DM and non healing R foot wound  Sent to ED for podiatric intervention  Non palp pulses on exam  No recent vascular studies  Will obtain PHUONG/PVR's  Further recommendations following imaging  Continue local wound care as per podiatry  Will discuss with Dr Rizzo

## 2024-05-20 NOTE — ED PROVIDER NOTE - OBJECTIVE STATEMENT
59yo male sent in from wound care for nnohealing right foot wound states he had it shaved in the past and now the bone is coming thru again, no fever, chills, pt has neuropathy so he has no pain no other complaints

## 2024-05-20 NOTE — H&P ADULT - PROBLEM SELECTOR PLAN 1
Admitted for non healing right foot wound. On home Bactrim for prophylaxis. Follows outpatient with Pod Dr. Catherine and Dr. Campbell   - Right Foot Xray:  Status post resection of the fifth ray at the level of the distal metatarsal and proximal phalanx. Status post resection of the second and third digits. No cortical erosion or periosteal reaction. There is a plantar calcaneal enthesophyte. Strength is present within the medial navicular.  - S/p IV Zosyn x1 and IV Vanco 1g x1  - Afebrile, WBC wnl   - Continue IV Vanco and IV Cefepime for osteomyelitis coverage   - Hold home Bactrim  - Continue local wound care with silver alginate and sterile dry dressing to the right foot  - Scheduled for right foot exostectomy tomorrow 5/21/24 with Dr. Catherine  - F/u blood and wound cultures   - NPO after midnight   - Podiatry consulted, f/u recs   - ID consulted, f/u recs   - Vascular consulted, f/u recs Admitted for non healing right foot wound. On home Bactrim for prophylaxis. Follows outpatient with Pod Dr. Catherine and Dr. Campbell   - Right Foot Xray:  Status post resection of the fifth ray at the level of the distal metatarsal and proximal phalanx. Status post resection of the second and third digits. No cortical erosion or periosteal reaction. There is a plantar calcaneal enthesophyte. Strength is present within the medial navicular.  - S/p IV Zosyn x1 and IV Vanco 1g x1  - Afebrile, WBC wnl   - Continue IV Vanco and IV Cefepime for osteomyelitis coverage   - Hold home Bactrim  - Continue local wound care with silver alginate and sterile dry dressing to the right foot  - Scheduled for right foot exostectomy tomorrow 5/21/24 with Dr. Catherine  - F/u blood and wound cultures   - NPO after midnight   - Will need cardiac clearance   - Podiatry consulted, f/u recs   - ID consulted, f/u recs   - Vascular consulted, f/u recs Admitted for non healing right foot wound. On home Bactrim for prophylaxis. Follows outpatient with Pod Dr. Catherine and Dr. Campbell   - Right Foot Xray:  Status post resection of the fifth ray at the level of the distal metatarsal and proximal phalanx. Status post resection of the second and third digits. No cortical erosion or periosteal reaction. There is a plantar calcaneal enthesophyte. Strength is present within the medial navicular.  - S/p IV Zosyn x1 and IV Vanco 1g x1  - Afebrile, WBC wnl   - Continue  IV Cefepime for osteomyelitis coverage   - Continue home Bactrim  - Continue local wound care with silver alginate and sterile dry dressing to the right foot  - Scheduled for right foot exostectomy tomorrow 5/21/24 with Dr. Catherine  - F/u blood and wound cultures   - NPO after midnight   - Will need cardiac clearance   - Podiatry consulted, f/u recs   - ID consulted, f/u recs   - Vascular consulted, f/u recs Admitted for non healing right foot wound. On home Bactrim for prophylaxis. Follows outpatient with Pod Dr. Catherine and Dr. Campbell   - Right Foot Xray:  Status post resection of the fifth ray at the level of the distal metatarsal and proximal phalanx. Status post resection of the second and third digits. No cortical erosion or periosteal reaction. There is a plantar calcaneal enthesophyte. Strength is present within the medial navicular.  - S/p IV Zosyn x1 and IV Vanco 1g x1  - Afebrile, WBC wnl   - Continue IV Cefepime 2g q8hrs for osteomyelitis coverage   - Continue home Bactrim  - Continue local wound care with silver alginate and sterile dry dressing to the right foot  - Scheduled for right foot exostectomy tomorrow 5/21/24 with Dr. Catherine  - F/u blood and wound cultures   - NPO after midnight   - Will need cardiac clearance   - Podiatry consulted, f/u recs   - ID consulted, f/u recs   - Vascular consulted, f/u recs

## 2024-05-20 NOTE — CARE COORDINATION ASSESSMENT. - NSPASTMEDSURGHISTORY_GEN_ALL_CORE_FT
PAST MEDICAL & SURGICAL HISTORY:  CKD (chronic kidney disease)  Renal Transplant 2013 at 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, Can NOT have MRI      Toe infection  2007 L 4th Toe surgery-amputation secondary to osteomyelitis  2019 partial right 2nd 4th toe amputation      Obesity (BMI 30-39.9)      Diabetic peripheral neuropathy      H/O foot surgery  2005 - right foot - bone fracture - s/p ORIF and subsequent removal of hardware      End-stage renal failure with renal transplant  12/2013      CVA (cerebral vascular accident)  [place] Stroke  low L body sensation  low R face sensation  decreased peripheral vision  poor balance  2015        History of DVT (deep vein thrombosis)  LLE 2016, was on Eliquis x 6 months  Was hospitalized for Rhinovirus at the time, intubated      Squamous cell carcinoma of skin of left ear  nose      S/P kidney transplant  2013      Recurrent squamous cell carcinoma of skin  nose, L arm      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      History of restrictive lung disease      ACRMEN treated with BiPAP  2L O2 at night      History of pleural effusion  right thoracentesis 6/2022 and 8/2022      History of loop recorder  2015

## 2024-05-21 DIAGNOSIS — E10.65 TYPE 1 DIABETES MELLITUS WITH HYPERGLYCEMIA: ICD-10-CM

## 2024-05-21 LAB
A1C WITH ESTIMATED AVERAGE GLUCOSE RESULT: 6.6 % — HIGH (ref 4–5.6)
ALBUMIN SERPL ELPH-MCNC: 3 G/DL — LOW (ref 3.3–5)
ALP SERPL-CCNC: 94 U/L — SIGNIFICANT CHANGE UP (ref 40–120)
ALT FLD-CCNC: 18 U/L — SIGNIFICANT CHANGE UP (ref 12–78)
ANION GAP SERPL CALC-SCNC: 2 MMOL/L — LOW (ref 5–17)
APTT BLD: 118.3 SEC — HIGH (ref 24.5–35.6)
APTT BLD: 147.8 SEC — CRITICAL HIGH (ref 24.5–35.6)
APTT BLD: 71.6 SEC — HIGH (ref 24.5–35.6)
APTT BLD: 89.4 SEC — HIGH (ref 24.5–35.6)
AST SERPL-CCNC: 19 U/L — SIGNIFICANT CHANGE UP (ref 15–37)
BILIRUB SERPL-MCNC: 0.5 MG/DL — SIGNIFICANT CHANGE UP (ref 0.2–1.2)
BUN SERPL-MCNC: 14 MG/DL — SIGNIFICANT CHANGE UP (ref 7–23)
CALCIUM SERPL-MCNC: 9 MG/DL — SIGNIFICANT CHANGE UP (ref 8.5–10.1)
CHLORIDE SERPL-SCNC: 107 MMOL/L — SIGNIFICANT CHANGE UP (ref 96–108)
CHOLEST SERPL-MCNC: 114 MG/DL — SIGNIFICANT CHANGE UP
CO2 SERPL-SCNC: 32 MMOL/L — HIGH (ref 22–31)
CREAT SERPL-MCNC: 1.1 MG/DL — SIGNIFICANT CHANGE UP (ref 0.5–1.3)
EGFR: 78 ML/MIN/1.73M2 — SIGNIFICANT CHANGE UP
ESTIMATED AVERAGE GLUCOSE: 143 MG/DL — HIGH (ref 68–114)
GLUCOSE SERPL-MCNC: 103 MG/DL — HIGH (ref 70–99)
HCT VFR BLD CALC: 39.4 % — SIGNIFICANT CHANGE UP (ref 39–50)
HCT VFR BLD CALC: 39.7 % — SIGNIFICANT CHANGE UP (ref 39–50)
HDLC SERPL-MCNC: 35 MG/DL — LOW
HGB BLD-MCNC: 13 G/DL — SIGNIFICANT CHANGE UP (ref 13–17)
HGB BLD-MCNC: 13.3 G/DL — SIGNIFICANT CHANGE UP (ref 13–17)
LIPID PNL WITH DIRECT LDL SERPL: 64 MG/DL — SIGNIFICANT CHANGE UP
MCHC RBC-ENTMCNC: 29.8 PG — SIGNIFICANT CHANGE UP (ref 27–34)
MCHC RBC-ENTMCNC: 30.4 PG — SIGNIFICANT CHANGE UP (ref 27–34)
MCHC RBC-ENTMCNC: 32.7 GM/DL — SIGNIFICANT CHANGE UP (ref 32–36)
MCHC RBC-ENTMCNC: 33.8 GM/DL — SIGNIFICANT CHANGE UP (ref 32–36)
MCV RBC AUTO: 90 FL — SIGNIFICANT CHANGE UP (ref 80–100)
MCV RBC AUTO: 91.1 FL — SIGNIFICANT CHANGE UP (ref 80–100)
NON HDL CHOLESTEROL: 78 MG/DL — SIGNIFICANT CHANGE UP
NRBC # BLD: 0 /100 WBCS — SIGNIFICANT CHANGE UP (ref 0–0)
NRBC # BLD: 0 /100 WBCS — SIGNIFICANT CHANGE UP (ref 0–0)
PLATELET # BLD AUTO: 197 K/UL — SIGNIFICANT CHANGE UP (ref 150–400)
PLATELET # BLD AUTO: 199 K/UL — SIGNIFICANT CHANGE UP (ref 150–400)
POTASSIUM SERPL-MCNC: 4.1 MMOL/L — SIGNIFICANT CHANGE UP (ref 3.5–5.3)
POTASSIUM SERPL-SCNC: 4.1 MMOL/L — SIGNIFICANT CHANGE UP (ref 3.5–5.3)
PROT SERPL-MCNC: 6.8 G/DL — SIGNIFICANT CHANGE UP (ref 6–8.3)
RBC # BLD: 4.36 M/UL — SIGNIFICANT CHANGE UP (ref 4.2–5.8)
RBC # BLD: 4.38 M/UL — SIGNIFICANT CHANGE UP (ref 4.2–5.8)
RBC # FLD: 13.8 % — SIGNIFICANT CHANGE UP (ref 10.3–14.5)
RBC # FLD: 14.1 % — SIGNIFICANT CHANGE UP (ref 10.3–14.5)
SODIUM SERPL-SCNC: 141 MMOL/L — SIGNIFICANT CHANGE UP (ref 135–145)
TRIGL SERPL-MCNC: 66 MG/DL — SIGNIFICANT CHANGE UP
TSH SERPL-MCNC: 2.05 UIU/ML — SIGNIFICANT CHANGE UP (ref 0.36–3.74)
WBC # BLD: 6.66 K/UL — SIGNIFICANT CHANGE UP (ref 3.8–10.5)
WBC # BLD: 9.43 K/UL — SIGNIFICANT CHANGE UP (ref 3.8–10.5)
WBC # FLD AUTO: 6.66 K/UL — SIGNIFICANT CHANGE UP (ref 3.8–10.5)
WBC # FLD AUTO: 9.43 K/UL — SIGNIFICANT CHANGE UP (ref 3.8–10.5)

## 2024-05-21 PROCEDURE — 88311 DECALCIFY TISSUE: CPT | Mod: 26

## 2024-05-21 PROCEDURE — 73630 X-RAY EXAM OF FOOT: CPT | Mod: 26,RT

## 2024-05-21 PROCEDURE — 99232 SBSQ HOSP IP/OBS MODERATE 35: CPT

## 2024-05-21 PROCEDURE — 28288 PARTIAL REMOVAL OF FOOT BONE: CPT | Mod: RT

## 2024-05-21 PROCEDURE — 93010 ELECTROCARDIOGRAM REPORT: CPT

## 2024-05-21 PROCEDURE — 93923 UPR/LXTR ART STDY 3+ LVLS: CPT | Mod: 26

## 2024-05-21 PROCEDURE — 88304 TISSUE EXAM BY PATHOLOGIST: CPT | Mod: 26

## 2024-05-21 PROCEDURE — 99233 SBSQ HOSP IP/OBS HIGH 50: CPT | Mod: GC

## 2024-05-21 DEVICE — SURGICEL NU-KNIT 6 X 9": Type: IMPLANTABLE DEVICE | Status: FUNCTIONAL

## 2024-05-21 RX ORDER — HEPARIN SODIUM 5000 [USP'U]/ML
2000 INJECTION INTRAVENOUS; SUBCUTANEOUS
Qty: 25000 | Refills: 0 | Status: DISCONTINUED | OUTPATIENT
Start: 2024-05-21 | End: 2024-05-21

## 2024-05-21 RX ORDER — AZATHIOPRINE 100 MG/1
100 TABLET ORAL DAILY
Refills: 0 | Status: DISCONTINUED | OUTPATIENT
Start: 2024-05-21 | End: 2024-05-25

## 2024-05-21 RX ORDER — SODIUM CHLORIDE 9 MG/ML
1000 INJECTION, SOLUTION INTRAVENOUS
Refills: 0 | Status: DISCONTINUED | OUTPATIENT
Start: 2024-05-21 | End: 2024-05-21

## 2024-05-21 RX ORDER — CEFEPIME 1 G/1
2000 INJECTION, POWDER, FOR SOLUTION INTRAMUSCULAR; INTRAVENOUS EVERY 8 HOURS
Refills: 0 | Status: DISCONTINUED | OUTPATIENT
Start: 2024-05-21 | End: 2024-05-21

## 2024-05-21 RX ORDER — LANOLIN ALCOHOL/MO/W.PET/CERES
3 CREAM (GRAM) TOPICAL AT BEDTIME
Refills: 0 | Status: DISCONTINUED | OUTPATIENT
Start: 2024-05-21 | End: 2024-05-25

## 2024-05-21 RX ORDER — HEPARIN SODIUM 5000 [USP'U]/ML
10000 INJECTION INTRAVENOUS; SUBCUTANEOUS EVERY 6 HOURS
Refills: 0 | Status: DISCONTINUED | OUTPATIENT
Start: 2024-05-21 | End: 2024-05-22

## 2024-05-21 RX ORDER — GABAPENTIN 400 MG/1
300 CAPSULE ORAL
Refills: 0 | Status: DISCONTINUED | OUTPATIENT
Start: 2024-05-21 | End: 2024-05-25

## 2024-05-21 RX ORDER — SODIUM CHLORIDE 9 MG/ML
1000 INJECTION, SOLUTION INTRAVENOUS
Refills: 0 | Status: DISCONTINUED | OUTPATIENT
Start: 2024-05-21 | End: 2024-05-25

## 2024-05-21 RX ORDER — DEXTROSE 10 % IN WATER 10 %
125 INTRAVENOUS SOLUTION INTRAVENOUS ONCE
Refills: 0 | Status: DISCONTINUED | OUTPATIENT
Start: 2024-05-21 | End: 2024-05-25

## 2024-05-21 RX ORDER — NIFEDIPINE 30 MG
30 TABLET, EXTENDED RELEASE 24 HR ORAL
Refills: 0 | Status: DISCONTINUED | OUTPATIENT
Start: 2024-05-21 | End: 2024-05-25

## 2024-05-21 RX ORDER — PIPERACILLIN AND TAZOBACTAM 4; .5 G/20ML; G/20ML
3.38 INJECTION, POWDER, LYOPHILIZED, FOR SOLUTION INTRAVENOUS ONCE
Refills: 0 | Status: COMPLETED | OUTPATIENT
Start: 2024-05-22 | End: 2024-05-22

## 2024-05-21 RX ORDER — HEPARIN SODIUM 5000 [USP'U]/ML
5000 INJECTION INTRAVENOUS; SUBCUTANEOUS EVERY 6 HOURS
Refills: 0 | Status: DISCONTINUED | OUTPATIENT
Start: 2024-05-21 | End: 2024-05-22

## 2024-05-21 RX ORDER — CARVEDILOL PHOSPHATE 80 MG/1
12.5 CAPSULE, EXTENDED RELEASE ORAL EVERY 12 HOURS
Refills: 0 | Status: DISCONTINUED | OUTPATIENT
Start: 2024-05-21 | End: 2024-05-25

## 2024-05-21 RX ORDER — PIPERACILLIN AND TAZOBACTAM 4; .5 G/20ML; G/20ML
3.38 INJECTION, POWDER, LYOPHILIZED, FOR SOLUTION INTRAVENOUS ONCE
Refills: 0 | Status: COMPLETED | OUTPATIENT
Start: 2024-05-21 | End: 2024-05-21

## 2024-05-21 RX ORDER — ONDANSETRON 8 MG/1
4 TABLET, FILM COATED ORAL ONCE
Refills: 0 | Status: DISCONTINUED | OUTPATIENT
Start: 2024-05-21 | End: 2024-05-21

## 2024-05-21 RX ORDER — LOSARTAN POTASSIUM 100 MG/1
25 TABLET, FILM COATED ORAL AT BEDTIME
Refills: 0 | Status: DISCONTINUED | OUTPATIENT
Start: 2024-05-21 | End: 2024-05-23

## 2024-05-21 RX ORDER — GLUCAGON INJECTION, SOLUTION 0.5 MG/.1ML
1 INJECTION, SOLUTION SUBCUTANEOUS ONCE
Refills: 0 | Status: DISCONTINUED | OUTPATIENT
Start: 2024-05-21 | End: 2024-05-25

## 2024-05-21 RX ORDER — HYDRALAZINE HCL 50 MG
100 TABLET ORAL THREE TIMES A DAY
Refills: 0 | Status: DISCONTINUED | OUTPATIENT
Start: 2024-05-21 | End: 2024-05-23

## 2024-05-21 RX ORDER — DEXTROSE 50 % IN WATER 50 %
12.5 SYRINGE (ML) INTRAVENOUS ONCE
Refills: 0 | Status: DISCONTINUED | OUTPATIENT
Start: 2024-05-21 | End: 2024-05-25

## 2024-05-21 RX ORDER — HYDROMORPHONE HYDROCHLORIDE 2 MG/ML
0.5 INJECTION INTRAMUSCULAR; INTRAVENOUS; SUBCUTANEOUS
Refills: 0 | Status: DISCONTINUED | OUTPATIENT
Start: 2024-05-21 | End: 2024-05-21

## 2024-05-21 RX ORDER — DEXTROSE 50 % IN WATER 50 %
15 SYRINGE (ML) INTRAVENOUS ONCE
Refills: 0 | Status: DISCONTINUED | OUTPATIENT
Start: 2024-05-21 | End: 2024-05-25

## 2024-05-21 RX ORDER — ONDANSETRON 8 MG/1
4 TABLET, FILM COATED ORAL EVERY 8 HOURS
Refills: 0 | Status: DISCONTINUED | OUTPATIENT
Start: 2024-05-21 | End: 2024-05-25

## 2024-05-21 RX ORDER — TACROLIMUS 5 MG/1
1.5 CAPSULE ORAL
Refills: 0 | Status: DISCONTINUED | OUTPATIENT
Start: 2024-05-21 | End: 2024-05-25

## 2024-05-21 RX ORDER — ACETAMINOPHEN 500 MG
650 TABLET ORAL EVERY 6 HOURS
Refills: 0 | Status: DISCONTINUED | OUTPATIENT
Start: 2024-05-21 | End: 2024-05-25

## 2024-05-21 RX ORDER — DEXTROSE 50 % IN WATER 50 %
25 SYRINGE (ML) INTRAVENOUS ONCE
Refills: 0 | Status: DISCONTINUED | OUTPATIENT
Start: 2024-05-21 | End: 2024-05-25

## 2024-05-21 RX ORDER — ATORVASTATIN CALCIUM 80 MG/1
10 TABLET, FILM COATED ORAL AT BEDTIME
Refills: 0 | Status: DISCONTINUED | OUTPATIENT
Start: 2024-05-21 | End: 2024-05-25

## 2024-05-21 RX ORDER — HEPARIN SODIUM 5000 [USP'U]/ML
INJECTION INTRAVENOUS; SUBCUTANEOUS
Qty: 25000 | Refills: 0 | Status: DISCONTINUED | OUTPATIENT
Start: 2024-05-21 | End: 2024-05-22

## 2024-05-21 RX ORDER — INSULIN LISPRO 100/ML
1 VIAL (ML) SUBCUTANEOUS
Refills: 0 | Status: DISCONTINUED | OUTPATIENT
Start: 2024-05-21 | End: 2024-05-25

## 2024-05-21 RX ORDER — ASPIRIN/CALCIUM CARB/MAGNESIUM 324 MG
81 TABLET ORAL DAILY
Refills: 0 | Status: DISCONTINUED | OUTPATIENT
Start: 2024-05-21 | End: 2024-05-25

## 2024-05-21 RX ORDER — PIPERACILLIN AND TAZOBACTAM 4; .5 G/20ML; G/20ML
3.38 INJECTION, POWDER, LYOPHILIZED, FOR SOLUTION INTRAVENOUS EVERY 8 HOURS
Refills: 0 | Status: DISCONTINUED | OUTPATIENT
Start: 2024-05-22 | End: 2024-05-24

## 2024-05-21 RX ADMIN — PIPERACILLIN AND TAZOBACTAM 25 GRAM(S): 4; .5 INJECTION, POWDER, LYOPHILIZED, FOR SOLUTION INTRAVENOUS at 18:07

## 2024-05-21 RX ADMIN — HEPARIN SODIUM 2000 UNIT(S)/HR: 5000 INJECTION INTRAVENOUS; SUBCUTANEOUS at 09:19

## 2024-05-21 RX ADMIN — TACROLIMUS 1.5 MILLIGRAM(S): 5 CAPSULE ORAL at 08:45

## 2024-05-21 RX ADMIN — GABAPENTIN 300 MILLIGRAM(S): 400 CAPSULE ORAL at 18:07

## 2024-05-21 RX ADMIN — ATORVASTATIN CALCIUM 10 MILLIGRAM(S): 80 TABLET, FILM COATED ORAL at 22:17

## 2024-05-21 RX ADMIN — HEPARIN SODIUM 2000 UNIT(S)/HR: 5000 INJECTION INTRAVENOUS; SUBCUTANEOUS at 07:01

## 2024-05-21 RX ADMIN — Medication 0.2 MILLIGRAM(S): at 06:34

## 2024-05-21 RX ADMIN — Medication 100 MILLIGRAM(S): at 22:17

## 2024-05-21 RX ADMIN — TACROLIMUS 1.5 MILLIGRAM(S): 5 CAPSULE ORAL at 21:07

## 2024-05-21 RX ADMIN — Medication 0.2 MILLIGRAM(S): at 22:17

## 2024-05-21 RX ADMIN — Medication 30 MILLIGRAM(S): at 06:34

## 2024-05-21 RX ADMIN — HEPARIN SODIUM 2000 UNIT(S)/HR: 5000 INJECTION INTRAVENOUS; SUBCUTANEOUS at 01:54

## 2024-05-21 RX ADMIN — Medication 30 MILLIGRAM(S): at 18:07

## 2024-05-21 RX ADMIN — CEFEPIME 100 MILLIGRAM(S): 1 INJECTION, POWDER, FOR SOLUTION INTRAMUSCULAR; INTRAVENOUS at 10:41

## 2024-05-21 RX ADMIN — CARVEDILOL PHOSPHATE 12.5 MILLIGRAM(S): 80 CAPSULE, EXTENDED RELEASE ORAL at 18:07

## 2024-05-21 RX ADMIN — CEFEPIME 100 MILLIGRAM(S): 1 INJECTION, POWDER, FOR SOLUTION INTRAMUSCULAR; INTRAVENOUS at 01:43

## 2024-05-21 RX ADMIN — GABAPENTIN 300 MILLIGRAM(S): 400 CAPSULE ORAL at 06:34

## 2024-05-21 RX ADMIN — HEPARIN SODIUM 2400 UNIT(S)/HR: 5000 INJECTION INTRAVENOUS; SUBCUTANEOUS at 12:43

## 2024-05-21 RX ADMIN — CARVEDILOL PHOSPHATE 12.5 MILLIGRAM(S): 80 CAPSULE, EXTENDED RELEASE ORAL at 06:34

## 2024-05-21 RX ADMIN — HEPARIN SODIUM 2000 UNIT(S)/HR: 5000 INJECTION INTRAVENOUS; SUBCUTANEOUS at 19:10

## 2024-05-21 RX ADMIN — Medication 1 TABLET(S): at 14:32

## 2024-05-21 RX ADMIN — LOSARTAN POTASSIUM 25 MILLIGRAM(S): 100 TABLET, FILM COATED ORAL at 22:17

## 2024-05-21 RX ADMIN — HEPARIN SODIUM 2000 UNIT(S)/HR: 5000 INJECTION INTRAVENOUS; SUBCUTANEOUS at 21:06

## 2024-05-21 RX ADMIN — SODIUM CHLORIDE 100 MILLILITER(S): 9 INJECTION, SOLUTION INTRAVENOUS at 12:40

## 2024-05-21 RX ADMIN — Medication 0.2 MILLIGRAM(S): at 14:32

## 2024-05-21 RX ADMIN — HEPARIN SODIUM 0 UNIT(S)/HR: 5000 INJECTION INTRAVENOUS; SUBCUTANEOUS at 18:08

## 2024-05-21 RX ADMIN — Medication 100 MILLIGRAM(S): at 06:34

## 2024-05-21 RX ADMIN — HEPARIN SODIUM 2000 UNIT(S)/HR: 5000 INJECTION INTRAVENOUS; SUBCUTANEOUS at 07:20

## 2024-05-21 RX ADMIN — Medication 81 MILLIGRAM(S): at 14:32

## 2024-05-21 RX ADMIN — AZATHIOPRINE 100 MILLIGRAM(S): 100 TABLET ORAL at 14:32

## 2024-05-21 RX ADMIN — PIPERACILLIN AND TAZOBACTAM 200 GRAM(S): 4; .5 INJECTION, POWDER, LYOPHILIZED, FOR SOLUTION INTRAVENOUS at 14:32

## 2024-05-21 RX ADMIN — Medication 100 MILLIGRAM(S): at 14:32

## 2024-05-21 NOTE — DIETITIAN INITIAL EVALUATION ADULT - NSICDXPASTSURGICALHX_GEN_ALL_CORE_FT
yes
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

## 2024-05-21 NOTE — DIETITIAN INITIAL EVALUATION ADULT - PROBLEM SELECTOR PLAN 6
Patient had CVA in 2015, unclear etiology. Wore a loop recorder for 4yrs with no cardiac events   - No focal deficits   - Continue home ASA daily   - Continue statin daily

## 2024-05-21 NOTE — PROGRESS NOTE ADULT - ASSESSMENT
Patient is a 59yo M with a PMH of type 1 diabetes mellitus, HTN, HLD, CVA (2015), prior DVT (s/p Eliquis), recurrent right pleural effusion with VATS, renal transplant (2013), sent in from wound care for a non healing right foot wound.     R foot wound  - Right Foot Xray:  Status post resection of the fifth ray at the level of the distal metatarsal and proximal phalanx. Status post resection of the second and third digits. No cortical erosion or periosteal reaction. There is a plantar calcaneal enthesophyte. Strength is present within the medial navicular.  - Prior WCx E. faecalis from 6/30/22  - S/p Exostectomy of right foot 21-May-2024 11:57:39  Donte Catherine    Hx of renal txp in 2013    Recommendations:  Switch to zosyn for coverage  F/u pending cx--BCx, WCx and TCx  C/w bactrim for ppx in renal txp pt  Trend temps/WBC  Appreciate podiatry recs  Additional care per primary team    Infectious Diseases will follow. Please call with any questions.  Claire Castro M.D.  [facility] Division of Infectious Diseases [phone]  For after 5 P.M. and weekends, please call [phone]

## 2024-05-21 NOTE — DIETITIAN INITIAL EVALUATION ADULT - ADD RECOMMEND
d/c renal restriction  d/c renal restriction ; Dr. Augustin made aware via telephone to accept order for DASH/TLC CC with evening snack

## 2024-05-21 NOTE — DIETITIAN INITIAL EVALUATION ADULT - NUTRITIONGOAL OUTCOME1
Problem: Impaired Activities of Daily Living  Goal: Achieve highest/safest level of independence in self care  Interventions:  - Assess ability and encourage patient to participate in ADLs to maximize function  - Promote sitting position while performing A after wound healed, gradual wt loss towards BMI < 30

## 2024-05-21 NOTE — PROGRESS NOTE ADULT - SUBJECTIVE AND OBJECTIVE BOX
Patient is a 58y old  Male who presents with a chief complaint of Type 2 diabetes mellitus with foot ulcer    Pt awaiting surgery; NPO and scheduled for this am  No events overnight  Was unable to have vascular studies performed    MEDICATIONS  (STANDING):  aspirin  chewable 81 milliGRAM(s) Oral daily  atorvastatin 10 milliGRAM(s) Oral at bedtime  azaTHIOprine 100 milliGRAM(s) Oral daily  carvedilol 12.5 milliGRAM(s) Oral every 12 hours  cefepime   IVPB 2000 milliGRAM(s) IV Intermittent every 8 hours  cloNIDine 0.2 milliGRAM(s) Oral three times a day  dextrose 10% Bolus 125 milliLiter(s) IV Bolus once  dextrose 5%. 1000 milliLiter(s) (50 mL/Hr) IV Continuous <Continuous>  dextrose 5%. 1000 milliLiter(s) (100 mL/Hr) IV Continuous <Continuous>  dextrose 50% Injectable 25 Gram(s) IV Push once  dextrose 50% Injectable 12.5 Gram(s) IV Push once  gabapentin 300 milliGRAM(s) Oral two times a day  glucagon  Injectable 1 milliGRAM(s) IntraMuscular once  heparin  Infusion. 2000 Unit(s)/Hr (20 mL/Hr) IV Continuous <Continuous>  hydrALAZINE 100 milliGRAM(s) Oral three times a day  insulin lispro (HumaLOG) Pump 1 Each SubCutaneous Continuous Pump  losartan 25 milliGRAM(s) Oral at bedtime  NIFEdipine XL 30 milliGRAM(s) Oral two times a day  sodium chloride 0.9%. 1000 milliLiter(s) (100 mL/Hr) IV Continuous <Continuous>  tacrolimus 1.5 milliGRAM(s) Oral <User Schedule>  trimethoprim   80 mG/sulfamethoxazole 400 mG 1 Tablet(s) Oral daily    MEDICATIONS  (PRN):  acetaminophen     Tablet .. 650 milliGRAM(s) Oral every 6 hours PRN Temp greater or equal to 38C (100.4F), Mild Pain (1 - 3)  aluminum hydroxide/magnesium hydroxide/simethicone Suspension 30 milliLiter(s) Oral every 4 hours PRN Dyspepsia  dextrose Oral Gel 15 Gram(s) Oral once PRN Blood Glucose LESS THAN 70 milliGRAM(s)/deciliter  heparin   Injectable 42026 Unit(s) IV Push every 6 hours PRN For aPTT less than 40  heparin   Injectable 5000 Unit(s) IV Push every 6 hours PRN For aPTT between 40 - 57  melatonin 3 milliGRAM(s) Oral at bedtime PRN Insomnia  ondansetron Injectable 4 milliGRAM(s) IV Push every 8 hours PRN Nausea and/or Vomiting    Allergies  No Known Allergies    Vital Signs Last 24 Hrs  T(C): 36.5 (21 May 2024 05:49), Max: 36.5 (20 May 2024 17:42)  T(F): 97.7 (21 May 2024 05:49), Max: 97.7 (20 May 2024 17:42)  HR: 69 (21 May 2024 05:49) (67 - 69)  BP: 132/70 (21 May 2024 05:49) (118/71 - 132/70)  BP(mean): --  RR: 17 (21 May 2024 05:49) (16 - 18)  SpO2: 93% (21 May 2024 05:49) (92% - 97%)    Parameters below as of 21 May 2024 05:49  Patient On (Oxygen Delivery Method): room air      I&O's Detail    20 May 2024 07:01  -  21 May 2024 07:00  --------------------------------------------------------  IN:  Total IN: 0 mL    OUT:    Voided (mL): 540 mL  Total OUT: 540 mL    Total NET: -540 mL    PHYSICAL EXAM:  Constitutional: WD M in NAD  Respiratory: CTA diminished at R base  Cardiovascular: normal S1, S2  Gastrointestinal: soft, NR, NT  Extremities: RLE with 1+ edema; Lat R foot wound with dressing CDI  Neurological: A& O x 3 BOO X 4 =  Pulses:   Right:                                                                          Left:  FEM [ x]2+ [ ]1+ [ ]doppler                                             FEM [x ]2+ [ ]1+ [ ]doppler    POP [ ]2+ [x ]1+ [ ]doppler                                             POP [ ]2+ [x ]1+ [ ]doppler    DP [ ]2+ [ ]1+ [ x]doppler                                                DP [ ]2+ [ x]1+ [ ]doppler  PT[ ]2+ [ ]x1+ [ ]doppler                                                  PT [ ]2+ [x ]1+ [ ]doppler        LABS:                        13.0   6.66  )-----------( 199      ( 21 May 2024 08:00 )             39.7     05-20    139  |  108  |  25<H>  ----------------------------<  161<H>  3.9   |  29  |  1.20    Ca    8.8      20 May 2024 10:10    TPro  6.9  /  Alb  3.2<L>  /  TBili  0.5  /  DBili  x   /  AST  13<L>  /  ALT  19  /  AlkPhos  93  05-20    PT/INR - ( 20 May 2024 10:10 )   PT: 10.8 sec;   INR: 0.92 ratio    PTT - ( 21 May 2024 08:00 )  PTT:89.4 sec    CAPILLARY BLOOD GLUCOSE  POCT Blood Glucose.: 99 mg/dL (21 May 2024 08:17)  POCT Blood Glucose.: 94 mg/dL (20 May 2024 23:15)  POCT Blood Glucose.: 82 mg/dL (20 May 2024 17:37)    A1C with Estimated Average Glucose Result: 7.2: Method: Immunoassay       Reference Range                4.0-5.6%       High risk (prediabetic)        5.7-6.4%       Diabetic, diagnostic             >=6.5%       ADA diabetic treatment goal       <7.0%  The Hemoglobin A1c testing is NGSP-certified.Reference ranges are based  upon the 2010 recommendations of  the American Diabetes Association.  Interpretation may vary for children  and adolescents. % (01.18.23 @ 06:41)        Radiology and Additional Studies:    < from: [facility] Physiol Extremity Lower 3+ Level, BI (11.17.21 @ 15:12) >    EXAM:  PHYSIOL EXTREM LOW 3+ LEV BI                            PROCEDURE DATE:  11/17/2021          INTERPRETATION:  History:Diabetic right foot ulcer, hypertension with hyperlipidemia and diabetes. Right lower extremity claudication    Technique: Bilateral PHUONG/PVR    Comparison: None    Findings:    RIGHT  PHUONG: 1.2  TBI: 0.5  Flow study: There is good flow to the great toe, pulsatile waveforms likely secondary to calcified vasculature    LEFT  PHUONG: 1.1  TBI: 0.7  Flow study: There is good flowto the great toe, pulsatile waveforms likely secondary to calcified vasculature      Impression:    Calcified lower extremity arteries. No evidence of large vessel hemodynamically significant lower extremity arterial disease at rest.    < end of copied text >

## 2024-05-21 NOTE — DIETITIAN INITIAL EVALUATION ADULT - PROBLEM SELECTOR PLAN 4
Patient is s/p right renal transplant in 2013. Baseline Cr around 1.2. Follows Nephro Dr. Chi and Dr. Stone   - BUN/Cr 25/1.2  - Continue home Azathioprine 100mg daily   - Continue home Tacrolimus 1.5 daily at 9:30 and 21:30  - Continue home Bactrim for prophylaxis   - Avoid nephrotoxic meds   - Monitor daily CMP

## 2024-05-21 NOTE — PROGRESS NOTE ADULT - ASSESSMENT
59yo M with a PMH of type 1 diabetes mellitus, HTN, HLD, CVA (2015), prior DVT (s/p Eliquis ~ 9 years ago), recurrent right pleural effusion with VATS, renal transplant (2013), sent in from wound care for a non healing right foot wound.     Cardiac optimization, New onset Afib   - presented from wound care center for non healing right foot wound, planned for right foot exostectomy with Dr. Catherine, today (5/21/24)    - EKG : Afib 60 bpm, no acute ischemia noted, new from prior read  - no anginal complaints  - continue ASA and statin     - New onset Afib, unknown duration  - CHADVasc: 5   - continue hep gtt (Full AC protocol) for thromboembolic prevention, can hold prior to surgery   - telemetry: Afib 70's no other events recorded thus far, continue tele for now   - BP controlled (hx of longstanding HTN)  - would continue home anti hypertensives as tolerated by blood pressure: Coreg, Clonidine, hydralazine, losartan, nifedipine   - Monitor and replete Lytes. Keep K > 4 and Mg > 2    - CXR: neg for acute process  - TTE: (2022) normal EF 65 %  - TTE (5/2024) normaL EF 58 %   - has chronic b/l LE edema, otherwise no sign of volume overload on exam    - Pt has no active ischemia, decompensated heart failure, unstable arrythmia, or severe stenotic valvular disease. Patient is optimized from cardiovascular standpoint to proceed with planned procedure with routine hemodynamic monitoring.     - Will continue to follow.    CJ Frias  Nurse Practitioner - Cardiology   call TEAMS

## 2024-05-21 NOTE — PROGRESS NOTE ADULT - PROBLEM SELECTOR PLAN 2
EKG on admission showed new onset Afib, rate controlled. No known cardiac conditions in the past.   - Cxray: No acute finding or change.  - EKbpm, QTc 418, Afib  - TTE (): EF 65-70%, no significant valvular pathology   - Stress Test  no signs of ischemia  - EKG complete - Afib rate 64  - TTE reviewed - EF 58%  - Continue home Carvedilol 12.5mg BID  - Continue heparin gtt until time for OR  - Cardio consulted, f/u recs EKG on admission showed new onset Afib, rate controlled. No known cardiac conditions in the past.   - Cxray: No acute finding or change.  - EKbpm, QTc 418, Afib  - TTE (): EF 65-70%, no significant valvular pathology   - Stress Test  no signs of ischemia  - EKG complete - Afib rate 64  - TTE reviewed - EF 58%  - Continue home Carvedilol 12.5mg BID  - Continue heparin gtt  switch in  full ac   - Cardio consulted  dr davies

## 2024-05-21 NOTE — DIETITIAN INITIAL EVALUATION ADULT - PROBLEM SELECTOR PLAN 1
Admitted for non healing right foot wound. On home Bactrim for prophylaxis. Follows outpatient with Pod Dr. Catherine and Dr. Campbell   - Right Foot Xray:  Status post resection of the fifth ray at the level of the distal metatarsal and proximal phalanx. Status post resection of the second and third digits. No cortical erosion or periosteal reaction. There is a plantar calcaneal enthesophyte. Strength is present within the medial navicular.  - S/p IV Zosyn x1 and IV Vanco 1g x1  - Afebrile, WBC wnl   - Continue IV Cefepime 2g q8hrs for osteomyelitis coverage   - Continue home Bactrim  - Continue local wound care with silver alginate and sterile dry dressing to the right foot  - Scheduled for right foot exostectomy tomorrow 5/21/24 with Dr. Catherine  - F/u blood and wound cultures   - NPO after midnight   - Will need cardiac clearance   - Podiatry consulted, f/u recs   - ID consulted, f/u recs   - Vascular consulted, f/u recs

## 2024-05-21 NOTE — PROVIDER CONTACT NOTE (CRITICAL VALUE NOTIFICATION) - ACTION/TREATMENT ORDERED:
haprin stop for an hour at 5/20 23:50 will resume at 5/21 00:50
Follow heparin nomogram  Stop for 1 hour, decrease rate by 4

## 2024-05-21 NOTE — DIETITIAN INITIAL EVALUATION ADULT - PERTINENT MEDS FT
MEDICATIONS  (STANDING):  aspirin  chewable 81 milliGRAM(s) Oral daily  atorvastatin 10 milliGRAM(s) Oral at bedtime  azaTHIOprine 100 milliGRAM(s) Oral daily  carvedilol 12.5 milliGRAM(s) Oral every 12 hours  cefepime   IVPB 2000 milliGRAM(s) IV Intermittent every 8 hours  cloNIDine 0.2 milliGRAM(s) Oral three times a day  dextrose 10% Bolus 125 milliLiter(s) IV Bolus once  dextrose 5%. 1000 milliLiter(s) (100 mL/Hr) IV Continuous <Continuous>  dextrose 5%. 1000 milliLiter(s) (50 mL/Hr) IV Continuous <Continuous>  dextrose 50% Injectable 25 Gram(s) IV Push once  dextrose 50% Injectable 12.5 Gram(s) IV Push once  gabapentin 300 milliGRAM(s) Oral two times a day  glucagon  Injectable 1 milliGRAM(s) IntraMuscular once  heparin  Infusion. 2000 Unit(s)/Hr (20 mL/Hr) IV Continuous <Continuous>  hydrALAZINE 100 milliGRAM(s) Oral three times a day  insulin lispro (HumaLOG) Pump 1 Each SubCutaneous Continuous Pump  losartan 25 milliGRAM(s) Oral at bedtime  NIFEdipine XL 30 milliGRAM(s) Oral two times a day  sodium chloride 0.9%. 1000 milliLiter(s) (100 mL/Hr) IV Continuous <Continuous>  tacrolimus 1.5 milliGRAM(s) Oral <User Schedule>  trimethoprim   80 mG/sulfamethoxazole 400 mG 1 Tablet(s) Oral daily    MEDICATIONS  (PRN):  acetaminophen     Tablet .. 650 milliGRAM(s) Oral every 6 hours PRN Temp greater or equal to 38C (100.4F), Mild Pain (1 - 3)  aluminum hydroxide/magnesium hydroxide/simethicone Suspension 30 milliLiter(s) Oral every 4 hours PRN Dyspepsia  dextrose Oral Gel 15 Gram(s) Oral once PRN Blood Glucose LESS THAN 70 milliGRAM(s)/deciliter  heparin   Injectable 11445 Unit(s) IV Push every 6 hours PRN For aPTT less than 40  heparin   Injectable 5000 Unit(s) IV Push every 6 hours PRN For aPTT between 40 - 57  melatonin 3 milliGRAM(s) Oral at bedtime PRN Insomnia  ondansetron Injectable 4 milliGRAM(s) IV Push every 8 hours PRN Nausea and/or Vomiting

## 2024-05-21 NOTE — DIETITIAN INITIAL EVALUATION ADULT - PERTINENT LABORATORY DATA
05-20    139  |  108  |  25<H>  ----------------------------<  161<H>  3.9   |  29  |  1.20    Ca    8.8      20 May 2024 10:10    TPro  6.9  /  Alb  3.2<L>  /  TBili  0.5  /  DBili  x   /  AST  13<L>  /  ALT  19  /  AlkPhos  93  05-20  POCT Blood Glucose.: 99 mg/dL (05-21-24 @ 08:17)

## 2024-05-21 NOTE — CASE MANAGEMENT PROGRESS NOTE - NSCMPROGRESSNOTE_GEN_ALL_CORE
Pt for the OR today for a right foot ulcer. On IV Cefepime. CM to continue to follow post-op for post acute needs. From home independent prior to this admission.

## 2024-05-21 NOTE — PROGRESS NOTE ADULT - ASSESSMENT
57 y/o M with Type 1 DM and non healing R foot wound  Sent to ED for podiatric intervention    No recent vascular studies  PHUONG/PVR's from 2019 without hemodynamically sig stenosis  Non palp pulses on exam  Pt is at risk for nonhealing sec to DM  Monitor closely for post op complications. Vascular available if needed

## 2024-05-21 NOTE — DIETITIAN INITIAL EVALUATION ADULT - OTHER INFO
Patient is a 57yo M with a PMH of type 1 diabetes mellitus, HTN, HLD, CVA (2015), prior DVT (s/p Eliquis), recurrent right pleural effusion with VATS, renal transplant (2013), sent in from wound care for a non healing right foot wound. Patient has been following with outpatient wound care for the non healing wound on his right foot, and has noted that it has progressively gotten worse. He was sent in for a right  foot exostectomy for tomorrow 5/21/24  Patient is a 57yo M with a PMH of type 1 diabetes mellitus, HTN, HLD, CVA (2015), prior DVT (s/p Eliquis), recurrent right pleural effusion with VATS, renal transplant (2013), sent in from wound care for a non healing right foot wound. Patient has been following with outpatient wound care for the non healing wound on his right foot, and has noted that it has progressively gotten worse. He was sent in for a right  foot exostectomy  5/21/24 now POD # 0.    At time of RD visit to pts bedside, pt sleeping. Noted A1c 6.6 (  excellent DM control) , Jan 2023 A1c 7.2 . Pt seen by endocrine NP, to resume insulin pump therapy. Pt is obese appearing

## 2024-05-21 NOTE — PROGRESS NOTE ADULT - SUBJECTIVE AND OBJECTIVE BOX
Patient is a 58y old  Male who presents with a chief complaint of Right foot wound (20 May 2024 16:19)      INTERVAL HPI/OVERNIGHT EVENTS: No acute overnight events. Pt was seen and examined at bedside. Pt states that he has planned podiatry procedure of his right foot this AM. Patient states the bottom of his foot collapses and keeps needing to get it "shaved down". Patient has had this done multiple times and is comfortable with the procedure. Patient states he would only like Tylenol for pain after the procedure.     Pt denies headache, dizziness, CP, SOB, palpitations, abdominal pain, n/v.  No other complaints at this time.     MEDICATIONS  (STANDING):  aspirin  chewable 81 milliGRAM(s) Oral daily  atorvastatin 10 milliGRAM(s) Oral at bedtime  azaTHIOprine 100 milliGRAM(s) Oral daily  carvedilol 12.5 milliGRAM(s) Oral every 12 hours  cefepime   IVPB 2000 milliGRAM(s) IV Intermittent every 8 hours  cloNIDine 0.2 milliGRAM(s) Oral three times a day  dextrose 10% Bolus 125 milliLiter(s) IV Bolus once  dextrose 5%. 1000 milliLiter(s) (50 mL/Hr) IV Continuous <Continuous>  dextrose 5%. 1000 milliLiter(s) (100 mL/Hr) IV Continuous <Continuous>  dextrose 50% Injectable 12.5 Gram(s) IV Push once  dextrose 50% Injectable 25 Gram(s) IV Push once  gabapentin 300 milliGRAM(s) Oral two times a day  glucagon  Injectable 1 milliGRAM(s) IntraMuscular once  heparin  Infusion. 2000 Unit(s)/Hr (20 mL/Hr) IV Continuous <Continuous>  hydrALAZINE 100 milliGRAM(s) Oral three times a day  insulin lispro (HumaLOG) Pump 1 Each SubCutaneous Continuous Pump  losartan 25 milliGRAM(s) Oral at bedtime  NIFEdipine XL 30 milliGRAM(s) Oral two times a day  sodium chloride 0.9%. 1000 milliLiter(s) (100 mL/Hr) IV Continuous <Continuous>  tacrolimus 1.5 milliGRAM(s) Oral <User Schedule>  trimethoprim   80 mG/sulfamethoxazole 400 mG 1 Tablet(s) Oral daily    MEDICATIONS  (PRN):  acetaminophen     Tablet .. 650 milliGRAM(s) Oral every 6 hours PRN Temp greater or equal to 38C (100.4F), Mild Pain (1 - 3)  aluminum hydroxide/magnesium hydroxide/simethicone Suspension 30 milliLiter(s) Oral every 4 hours PRN Dyspepsia  dextrose Oral Gel 15 Gram(s) Oral once PRN Blood Glucose LESS THAN 70 milliGRAM(s)/deciliter  heparin   Injectable 5000 Unit(s) IV Push every 6 hours PRN For aPTT between 40 - 57  heparin   Injectable 55529 Unit(s) IV Push every 6 hours PRN For aPTT less than 40  melatonin 3 milliGRAM(s) Oral at bedtime PRN Insomnia  ondansetron Injectable 4 milliGRAM(s) IV Push every 8 hours PRN Nausea and/or Vomiting      Allergies    No Known Allergies    Intolerances        REVIEW OF SYSTEMS:  CONSTITUTIONAL: No fever or chills  HEENT:  No headache, no sore throat  RESPIRATORY: No cough, wheezing, or shortness of breath  CARDIOVASCULAR: No chest pain, palpitations  GASTROINTESTINAL: No abd pain, nausea, vomiting, or diarrhea  GENITOURINARY: No dysuria, frequency, or hematuria  NEUROLOGICAL: no focal weakness or dizziness  MUSCULOSKELETAL: + right bottom foot pain    Vital Signs Last 24 Hrs  T(C): 36.5 (21 May 2024 05:49), Max: 36.8 (20 May 2024 09:15)  T(F): 97.7 (21 May 2024 05:49), Max: 98.2 (20 May 2024 09:15)  HR: 69 (21 May 2024 05:49) (67 - 84)  BP: 132/70 (21 May 2024 05:49) (118/71 - 133/74)  BP(mean): --  RR: 17 (21 May 2024 05:49) (16 - 18)  SpO2: 93% (21 May 2024 05:49) (92% - 100%)    Parameters below as of 21 May 2024 05:49  Patient On (Oxygen Delivery Method): room air        PHYSICAL EXAM:  GENERAL: NAD  HEENT:  anicteric, moist mucous membranes  CHEST/LUNG:  CTA b/l, no rales, wheezes, or rhonchi  HEART:  RRR, S1, S2  ABDOMEN:  BS+, soft, nontender, nondistended  EXTREMITIES: + right foot dressed, clean dry intact  NERVOUS SYSTEM: answers questions and follows commands appropriately    LABS:                        13.3   8.17  )-----------( 221      ( 20 May 2024 22:30 )             40.1     CBC Full  -  ( 20 May 2024 22:30 )  WBC Count : 8.17 K/uL  Hemoglobin : 13.3 g/dL  Hematocrit : 40.1 %  Platelet Count - Automated : 221 K/uL  Mean Cell Volume : 90.7 fl  Mean Cell Hemoglobin : 30.1 pg  Mean Cell Hemoglobin Concentration : 33.2 gm/dL  Auto Neutrophil # : x  Auto Lymphocyte # : x  Auto Monocyte # : x  Auto Eosinophil # : x  Auto Basophil # : x  Auto Neutrophil % : x  Auto Lymphocyte % : x  Auto Monocyte % : x  Auto Eosinophil % : x  Auto Basophil % : x    20 May 2024 10:10    139    |  108    |  25     ----------------------------<  161    3.9     |  29     |  1.20     Ca    8.8        20 May 2024 10:10    TPro  6.9    /  Alb  3.2    /  TBili  0.5    /  DBili  x      /  AST  13     /  ALT  19     /  AlkPhos  93     20 May 2024 10:10    PT/INR - ( 20 May 2024 10:10 )   PT: 10.8 sec;   INR: 0.92 ratio         PTT - ( 20 May 2024 22:30 )  PTT:156.9 sec  Urinalysis Basic - ( 20 May 2024 10:10 )    Color: x / Appearance: x / SG: x / pH: x  Gluc: 161 mg/dL / Ketone: x  / Bili: x / Urobili: x   Blood: x / Protein: x / Nitrite: x   Leuk Esterase: x / RBC: x / WBC x   Sq Epi: x / Non Sq Epi: x / Bacteria: x      CAPILLARY BLOOD GLUCOSE      POCT Blood Glucose.: 99 mg/dL (21 May 2024 08:17)  POCT Blood Glucose.: 94 mg/dL (20 May 2024 23:15)  POCT Blood Glucose.: 82 mg/dL (20 May 2024 17:37)          RADIOLOGY & ADDITIONAL TESTS: ____    Personally reviewed.     Consultant(s) Notes Reviewed:  [x] YES  [ ] NO     Patient is a 58y old  Male who presents with a chief complaint of Right foot wound (20 May 2024 16:19)      INTERVAL HPI/OVERNIGHT EVENTS: No acute overnight events. Pt was seen and examined at bedside. Pt states that he has planned podiatry procedure of his right foot this AM. Patient states the bottom of his foot collapses and keeps needing to get it "shaved down". Patient has had this done multiple times and is comfortable with the procedure. Patient states he would only like Tylenol for pain after the procedure.     Pt denies headache, dizziness, CP, SOB, palpitations, abdominal pain, n/v.  No other complaints at this time.     MEDICATIONS  (STANDING):  aspirin  chewable 81 milliGRAM(s) Oral daily  atorvastatin 10 milliGRAM(s) Oral at bedtime  azaTHIOprine 100 milliGRAM(s) Oral daily  carvedilol 12.5 milliGRAM(s) Oral every 12 hours  cefepime   IVPB 2000 milliGRAM(s) IV Intermittent every 8 hours  cloNIDine 0.2 milliGRAM(s) Oral three times a day  dextrose 10% Bolus 125 milliLiter(s) IV Bolus once  dextrose 5%. 1000 milliLiter(s) (50 mL/Hr) IV Continuous <Continuous>  dextrose 5%. 1000 milliLiter(s) (100 mL/Hr) IV Continuous <Continuous>  dextrose 50% Injectable 12.5 Gram(s) IV Push once  dextrose 50% Injectable 25 Gram(s) IV Push once  gabapentin 300 milliGRAM(s) Oral two times a day  glucagon  Injectable 1 milliGRAM(s) IntraMuscular once  heparin  Infusion. 2000 Unit(s)/Hr (20 mL/Hr) IV Continuous <Continuous>  hydrALAZINE 100 milliGRAM(s) Oral three times a day  insulin lispro (HumaLOG) Pump 1 Each SubCutaneous Continuous Pump  losartan 25 milliGRAM(s) Oral at bedtime  NIFEdipine XL 30 milliGRAM(s) Oral two times a day  sodium chloride 0.9%. 1000 milliLiter(s) (100 mL/Hr) IV Continuous <Continuous>  tacrolimus 1.5 milliGRAM(s) Oral <User Schedule>  trimethoprim   80 mG/sulfamethoxazole 400 mG 1 Tablet(s) Oral daily    MEDICATIONS  (PRN):  acetaminophen     Tablet .. 650 milliGRAM(s) Oral every 6 hours PRN Temp greater or equal to 38C (100.4F), Mild Pain (1 - 3)  aluminum hydroxide/magnesium hydroxide/simethicone Suspension 30 milliLiter(s) Oral every 4 hours PRN Dyspepsia  dextrose Oral Gel 15 Gram(s) Oral once PRN Blood Glucose LESS THAN 70 milliGRAM(s)/deciliter  heparin   Injectable 5000 Unit(s) IV Push every 6 hours PRN For aPTT between 40 - 57  heparin   Injectable 85404 Unit(s) IV Push every 6 hours PRN For aPTT less than 40  melatonin 3 milliGRAM(s) Oral at bedtime PRN Insomnia  ondansetron Injectable 4 milliGRAM(s) IV Push every 8 hours PRN Nausea and/or Vomiting      Allergies    No Known Allergies    Intolerances        REVIEW OF SYSTEMS:  CONSTITUTIONAL: No fever or chills  HEENT:  No headache, no sore throat  RESPIRATORY: No cough, wheezing, or shortness of breath  CARDIOVASCULAR: No chest pain, palpitations  GASTROINTESTINAL: No abd pain, nausea, vomiting, or diarrhea  GENITOURINARY: No dysuria, frequency, or hematuria  NEUROLOGICAL: no focal weakness or dizziness  MUSCULOSKELETAL: + right bottom foot pain    Vital Signs Last 24 Hrs  T(C): 36.5 (21 May 2024 05:49), Max: 36.8 (20 May 2024 09:15)  T(F): 97.7 (21 May 2024 05:49), Max: 98.2 (20 May 2024 09:15)  HR: 69 (21 May 2024 05:49) (67 - 84)  BP: 132/70 (21 May 2024 05:49) (118/71 - 133/74)  BP(mean): --  RR: 17 (21 May 2024 05:49) (16 - 18)  SpO2: 93% (21 May 2024 05:49) (92% - 100%)    Parameters below as of 21 May 2024 05:49  Patient On (Oxygen Delivery Method): room air        PHYSICAL EXAM:  GENERAL: NAD  HEENT:  anicteric, moist mucous membranes  CHEST/LUNG:  CTA b/l, no rales, wheezes, or rhonchi  HEART:  S1, S2 , irregular/ no tachy    ABDOMEN:  BS+, soft, nontender, nondistended  EXTREMITIES: + right foot dressed, clean dry intact  NERVOUS SYSTEM: answers questions and follows commands appropriately   INTACT   LABS:                        13.3   8.17  )-----------( 221      ( 20 May 2024 22:30 )             40.1     CBC Full  -  ( 20 May 2024 22:30 )  WBC Count : 8.17 K/uL  Hemoglobin : 13.3 g/dL  Hematocrit : 40.1 %  Platelet Count - Automated : 221 K/uL  Mean Cell Volume : 90.7 fl  Mean Cell Hemoglobin : 30.1 pg  Mean Cell Hemoglobin Concentration : 33.2 gm/dL  Auto Neutrophil # : x  Auto Lymphocyte # : x  Auto Monocyte # : x  Auto Eosinophil # : x  Auto Basophil # : x  Auto Neutrophil % : x  Auto Lymphocyte % : x  Auto Monocyte % : x  Auto Eosinophil % : x  Auto Basophil % : x    20 May 2024 10:10    139    |  108    |  25     ----------------------------<  161    3.9     |  29     |  1.20     Ca    8.8        20 May 2024 10:10    TPro  6.9    /  Alb  3.2    /  TBili  0.5    /  DBili  x      /  AST  13     /  ALT  19     /  AlkPhos  93     20 May 2024 10:10    PT/INR - ( 20 May 2024 10:10 )   PT: 10.8 sec;   INR: 0.92 ratio         PTT - ( 20 May 2024 22:30 )  PTT:156.9 sec  Urinalysis Basic - ( 20 May 2024 10:10 )    Color: x / Appearance: x / SG: x / pH: x  Gluc: 161 mg/dL / Ketone: x  / Bili: x / Urobili: x   Blood: x / Protein: x / Nitrite: x   Leuk Esterase: x / RBC: x / WBC x   Sq Epi: x / Non Sq Epi: x / Bacteria: x      CAPILLARY BLOOD GLUCOSE      POCT Blood Glucose.: 99 mg/dL (21 May 2024 08:17)  POCT Blood Glucose.: 94 mg/dL (20 May 2024 23:15)  POCT Blood Glucose.: 82 mg/dL (20 May 2024 17:37)          RADIOLOGY & ADDITIONAL TESTS: ____    Personally reviewed.     Consultant(s) Notes Reviewed:  [x] YES  [ ] NO

## 2024-05-21 NOTE — DIETITIAN INITIAL EVALUATION ADULT - PROBLEM SELECTOR PLAN 2
EKG on admission showed new onset Afib, rate controlled. No known cardiac conditions in the past.   - Cxray: No acute finding or change.  - EKbpm, QTc 418, Afib  - TTE (): EF 65-70%, no significant valvular pathology   - Stress Test  no signs of ischemia  - F/u repeat EKG   - F/u TTE  - Continue home Carvedilol 12.5mg BID  - Start heparin gtt  - Cardio consulted, f/u recs

## 2024-05-21 NOTE — PROGRESS NOTE ADULT - SUBJECTIVE AND OBJECTIVE BOX
[facility] -- Luly Egan, Karan Kelly Savella, Goodger, Cohen  Office # [phone]    Follow Up:  cardiac optimization     Subjective/Observations: seen and examined, awake, alert, resting comfortably in bed, denies chest pain, dyspnea. Tolerating room air. hep gtt infusing     REVIEW OF SYSTEMS: All other review of systems is negative unless indicated above  PAST MEDICAL & SURGICAL HISTORY:  Hypertension      Hyperlipidemia      Diabetes mellitus  Type 1 on insulin pump      CKD (chronic kidney disease)  Renal Transplant 2013 at [facility]      Diabetic peripheral neuropathy      Obesity (BMI 30-39.9)      CVA (cerebral vascular accident)  [place] Stroke  low L body sensation  low R face sensation  decreased peripheral vision  poor balance  2015        Squamous cell carcinoma of skin of left ear  nose      History of DVT (deep vein thrombosis)  LLE 2016, was on Eliquis x 6 months  Was hospitalized for Rhinovirus at the time, intubated      Recurrent squamous cell carcinoma of skin  nose, L arm      History of pleural effusion  right thoracentesis 6/2022 and 8/2022      CARMEN treated with BiPAP  2L O2 at night      History of restrictive lung disease      Toe infection  2007 L 4th Toe surgery-amputation secondary to osteomyelitis  2019 partial right 2nd 4th toe amputation      Ear disease  Left inner ear surgery, 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, Can NOT have MRI      Vasectomy status  s/p b/l  vasectomy      H/O foot surgery  2005 - right foot - bone fracture - s/p ORIF and subsequent removal of hardware      End-stage renal failure with renal transplant  12/2013      S/P kidney transplant  2013      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 1000 milliLiter(s) (100 mL/Hr) IV Continuous <Continuous>  dextrose 5%. 1000 milliLiter(s) (50 mL/Hr) IV Continuous <Continuous>  dextrose 50% Injectable 25 Gram(s) IV Push once  dextrose 50% Injectable 12.5 Gram(s) IV Push once  gabapentin 300 milliGRAM(s) Oral two times a day  glucagon  Injectable 1 milliGRAM(s) IntraMuscular once  heparin  Infusion. 2000 Unit(s)/Hr (20 mL/Hr) IV Continuous <Continuous>  hydrALAZINE 100 milliGRAM(s) Oral three times a day  insulin lispro (HumaLOG) Pump 1 Each SubCutaneous Continuous Pump  losartan 25 milliGRAM(s) Oral at bedtime  NIFEdipine XL 30 milliGRAM(s) Oral two times a day  sodium chloride 0.9%. 1000 milliLiter(s) (100 mL/Hr) IV Continuous <Continuous>  tacrolimus 1.5 milliGRAM(s) Oral <User Schedule>  trimethoprim   80 mG/sulfamethoxazole 400 mG 1 Tablet(s) Oral daily    MEDICATIONS  (PRN):  acetaminophen     Tablet .. 650 milliGRAM(s) Oral every 6 hours PRN Temp greater or equal to 38C (100.4F), Mild Pain (1 - 3)  aluminum hydroxide/magnesium hydroxide/simethicone Suspension 30 milliLiter(s) Oral every 4 hours PRN Dyspepsia  dextrose Oral Gel 15 Gram(s) Oral once PRN Blood Glucose LESS THAN 70 milliGRAM(s)/deciliter  heparin   Injectable 5000 Unit(s) IV Push every 6 hours PRN For aPTT between 40 - 57  heparin   Injectable 23841 Unit(s) IV Push every 6 hours PRN For aPTT less than 40  melatonin 3 milliGRAM(s) Oral at bedtime PRN Insomnia  ondansetron Injectable 4 milliGRAM(s) IV Push every 8 hours PRN Nausea and/or Vomiting    Allergies    No Known Allergies    Intolerances      Vital Signs Last 24 Hrs  T(C): 36.5 (21 May 2024 05:49), Max: 36.5 (20 May 2024 17:42)  T(F): 97.7 (21 May 2024 05:49), Max: 97.7 (20 May 2024 17:42)  HR: 69 (21 May 2024 05:49) (67 - 69)  BP: 132/70 (21 May 2024 05:49) (118/71 - 132/70)  BP(mean): --  RR: 17 (21 May 2024 05:49) (16 - 18)  SpO2: 93% (21 May 2024 05:49) (92% - 97%)    Parameters below as of 21 May 2024 05:49  Patient On (Oxygen Delivery Method): room air      I&O's Summary    20 May 2024 07:01  -  21 May 2024 07:00  --------------------------------------------------------  IN: 0 mL / OUT: 540 mL / NET: -540 mL      Weight (kg): 149.7 (05-20 @ 15:38)    TELE: Afib 70's   PHYSICAL EXAM:  Constitutional: NAD, awake and alert  HEENT: Moist Mucous Membranes, Anicteric  Pulmonary: Non-labored, breath sounds are clear bilaterally, No wheezing, rales or rhonchi  Cardiovascular: IRRR, S1 and S2, No murmurs, rubs, gallops or clicks  Gastrointestinal: Bowel Sounds present, soft, nontender.   Lymph: + +b/l peripheral edema. No lymphadenopathy.  Skin: RLE wound with dressing intact  Psych:  Mood & affect appropriate  LABS: All Labs Reviewed:                        13.0   6.66  )-----------( 199      ( 21 May 2024 08:00 )             39.7                         13.3   8.17  )-----------( 221      ( 20 May 2024 22:30 )             40.1                         12.6   5.80  )-----------( 204      ( 20 May 2024 10:10 )             38.8     21 May 2024 08:00    141    |  107    |  14     ----------------------------<  103    4.1     |  32     |  1.10   20 May 2024 10:10    139    |  108    |  25     ----------------------------<  161    3.9     |  29     |  1.20     Ca    9.0        21 May 2024 08:00  Ca    8.8        20 May 2024 10:10    TPro  6.8    /  Alb  3.0    /  TBili  0.5    /  DBili  x      /  AST  19     /  ALT  18     /  AlkPhos  94     21 May 2024 08:00  TPro  6.9    /  Alb  3.2    /  TBili  0.5    /  DBili  x      /  AST  13     /  ALT  19     /  AlkPhos  93     20 May 2024 10:10    PT/INR - ( 20 May 2024 10:10 )   PT: 10.8 sec;   INR: 0.92 ratio         PTT - ( 21 May 2024 08:00 )  PTT:89.4 sec      12 Lead ECG:   Ventricular Rate 64 BPM    QRS Duration 92 ms    Q-T Interval 406 ms    QTC Calculation(Bazett) 418 ms    R Axis -4 degrees    T Axis 17 degrees    Diagnosis Line Atrial fibrillation  Abnormal ECG  When compared with ECG of 17-JAN-2023 11:48,  Atrial fibrillation has replaced Sinus rhythm  Confirmed by Emelia Gagnon ([id number]) on 5/20/2024 12:34:37 PM (05-20-24 @ 10:00)      TRANSTHORACIC ECHOCARDIOGRAM REPORT  ________________________________________________________________________________                                      _______       Pt. Name:       LUTHER NORIEGA Study Date:    5/20/2024  MRN:            [id number]            YOB: 1965  Accession #:    [id number]           Age:           58 years  Account#:       [id number]          Gender:        M  Visit ID#  Heart Rate:                         Height:        75.98 in (193.00 cm)  Rhythm:                            Weight:        328.48 lb (149.00 kg)  Blood Pressure: 117/71 mmHg         BSA/BMI:       2.74 m² / 40.00 kg/m²  ________________________________________________________________________________________  Referring Physician:   [id number] Kristian Castro  Interpreting Physician: Marko Mauricio  Primary Sonographer:    Ferdinand Pereira    CPT:               ECHO TTE WO CON COMP W DOPP - 15984.m  Indication(s):     Abnormal electrocardiogram ECG/EKG - R94.31  Procedure:         Transthoracic echocardiogram with 2-D, M-mode and complete                     spectral and color flow Doppler.  Ordering Location: [facility]  Admission Status:  ED  Study Information: Image quality for this study is technically difficult.    _______________________________________________________________________________________     CONCLUSIONS:      1. Technically difficult image quality.   2. Left ventricular systolic function is normal with an ejection fraction of 58 % by Garay's method of disks.   3. The right ventricle is not well visualized. probably normal systolic function.   4. The left atrium is normal in size.   5. The right atrium is normal in size.   6. Tricuspid aortic valve with normal leaflet excursion. There is focal calcification of the aortic valve leaflets.   7. There is mild calcification of the mitral valve annulus.   8. Trace mitral regurgitation.   9. Trace tricuspid regurgitation.  10. The inferior vena cava is normal in size measuring 1.89 cm in diameter, (normal <2.1cm) with normal inspiratory collapse (normal >50%) consistent with normal right atrial pressure (~3, range 0-5mmHg).    ________________________________________________________________________________________  FINDINGS:     Left Ventricle:  Left ventricular systolic function is normal with a calculated ejection fraction of 58 % by the Garay's biplane method of disks. The left ventricular diastolic function is indeterminate.     Right Ventricle:  The right ventricle is not well visualized. Probably normal systolic function. Tricuspid annular plane systolic excursion (TAPSE) is 1.6 cm (normal >=1.7 cm).     Left Atrium:  The left atrium is normal in size with an indexed volume of 23.65 ml/m².     Right Atrium:  The right atrium is normal in size.     Aortic Valve:  The aortic valve is tricuspid with normal leaflet excursion. There is focal calcification of the aortic valve leaflets.     Mitral Valve:  There is mild calcification of the mitral valve annulus. There is trace mitral regurgitation.     Tricuspid Valve:  There is trace tricuspid regurgitation.     Pulmonic Valve:  The pulmonic valve was not well visualized. There is trace pulmonic regurgitation.     Aorta:  The aortic root at the sinuses of Valsalva is normal in size, measuring 3.20 cm (indexed 1.17 cm/m²). The aortic arch diameter is normal in size, measuring 2.8 cm (indexed 1.02 cm/m²).     Systemic Veins:  The inferior vena cava is normal in size measuring 1.89 cm in diameter, (normal <2.1cm) with normal inspiratory collapse (normal >50%) consistent with normal right atrial pressure (~3, range 0-5mmHg).  ____________________________________________________________________  QUANTITATIVE DATA:  Left Ventricle Measurements: (Indexed to BSA)     IVSd (2D):   1.2 cm  LVPWd (2D):  1.1 cm  LVIDd (2D):  4.9 cm  LVIDs (2D):  3.1 cm  LV Mass:     222 g  81.0 g/m²  LV Vol d, MOD A2C: 68.5 ml 25.04 ml/m²  LV Vol d, MOD A4C: 78.7 ml 28.77 ml/m²  LV Vol d, MOD BP:  75.3 ml 27.52 ml/m²  LV Vol s, MOD A2C: 23.4 ml 8.55 ml/m²  LV Vol s, MOD A4C: 34.1 ml 12.47 ml/m²  LV Vol s, MOD BP:  31.5 ml 11.50 ml/m²  LVOT SV MOD BP:    43.8 ml  LV EF% MOD BP:     58 %     MV E Vmax:    1.18 m/s  MV A Vmax:    0.35 m/s  MV E/A:       3.41  e' lateral:   10.90 cm/s  e' medial:    11.00cm/s  E/e' lateral: 10.83  E/e' medial:  10.73  E/e' Average: 10.78  MV DT:        219 msec    Aorta Measurements: (Normal range) (Indexed to BSA)     Sinuses of Valsalva: 3.20 cm (3.1 - 3.7 cm)  Ao Arch:             2.8 cm       Left Atrium Measurements: (Indexed to BSA)  LA Diam 2D: 4.10 cm    Right Ventricle Measurements:     TAPSE:            1.6 cm  RV Base (RVID1):  3.8 cm  RV Mid (RVID2):   3.3 cm  RV Major (RVID3): 6.7 cm       LVOT / RVOT/ Qp/Qs Data: (Indexed to BSA)  LVOT Diameter: 2.20 cm  LVOT Area:     3.80 cm²    Mitral Valve Measurements:     MV E Vmax: 1.2 m/s  MV A Vmax: 0.3 m/s  MV E/A:    3.4       Tricuspid Valve Measurements:     RA Pressure: 3 mmHg    ________________________________________________________________________________________  Electronically signed on 5/20/2024 at 5:52:45 PM by Marko Mauricio         *** Final ***

## 2024-05-21 NOTE — PROGRESS NOTE ADULT - PROBLEM SELECTOR PLAN 1
Admitted for non healing right foot wound. On home Bactrim for prophylaxis. Follows outpatient with Pod Dr. Catherine and Dr. Campbell   - Right Foot Xray:  Status post resection of the fifth ray at the level of the distal metatarsal and proximal phalanx. Status post resection of the second and third digits. No cortical erosion or periosteal reaction. There is a plantar calcaneal enthesophyte. Strength is present within the medial navicular.  - S/p IV Zosyn x1 and IV Vanco 1g x1  - Afebrile, WBC wnl   - Continue IV Cefepime 2g q8hrs for osteomyelitis coverage   - Continue home Bactrim  - Continue local wound care with silver alginate and sterile dry dressing to the right foot  - Plan for OR with podiatry this AM   - Scheduled for right foot exostectomy tomorrow 5/21/24 with Dr. Catherine  - F/u blood and wound cultures   - NPO after midnight   - Will need cardiac clearance   - Podiatry consulted, f/u recs   - ID consulted, f/u recs   - Vascular consulted, f/u recs Admitted for non healing right foot wound. On home Bactrim for prophylaxis. Follows outpatient with Pod Dr. Catherine and Dr. Campbell   - Right Foot Xray:  Status post resection of the fifth ray at the level of the distal metatarsal and proximal phalanx. Status post resection of the second and third digits. No cortical erosion or periosteal reaction. There is a plantar calcaneal enthesophyte. Strength is present within the medial navicular.  - S/p IV Zosyn x1 and IV Vanco 1g x1  - Afebrile, WBC wnl   - Continue IV Cefepime 2g q8hrs for osteomyelitis coverage   - Continue home Bactrim  - Continue local wound care with silver alginate and sterile dry dressing to the right foot  - Plan for OR with podiatry this AM   - Scheduled for right foot exostectomy tomorrow 5/21/24 with Dr. Catherine  - F/u blood and wound cultures   - NPO after midnight   - Will need cardiac clearance   - Podiatry consulted, f/u recs   - ID consulted, f/u recs   - Vascular consulted, f/u recs    Patient is class II risk based on the RCRI index score of 1. Patient is medically optimized at their current state for surgery. Admitted for non healing right  dm  foot wound. On home Bactrim for prophylaxis. Follows outpatient with Pod Dr. Catherine and Dr. Campbell   - Right Foot Xray:  Status post resection of the fifth ray at the level of the distal metatarsal and proximal phalanx. Status post resection of the second and third digits. No cortical erosion or periosteal reaction. There is a plantar calcaneal enthesophyte. Strength is present within the medial navicular.  - S/p IV Zosyn x1 and IV Vanco 1g x1  - Afebrile, WBC wnl   - Continue IV Cefepime 2g q8hrs for osteomyelitis coverage   - Continue home Bactrim  - Continue local wound care with silver alginate and sterile dry dressing to the right foot  - Plan for OR with podiatry this AM   - Scheduled for right foot exostectomy tomorrow 5/21/24 with Dr. Catherine  - F/u blood and wound cultures   - NPO after midnight   - Will need cardiac clearance   - Podiatry consulted, f/u recs   - ID consulted, f/u recs   - Vascular consulted, f/u recs    Patient is class II risk based on the RCRI index score of 1. Patient is medically optimized at their current state for surgery.

## 2024-05-21 NOTE — PROGRESS NOTE ADULT - PROBLEM SELECTOR PLAN 8
DVT Prophylaxis: Heparin gtt for A fib    - Will need initiation of AC given elevated CHADSVASc. DVT Prophylaxis: Heparin gtt for A fib    - Will need initiation of AC given elevated CHADSVASc.    Patient is class II risk based on the RCRI index score of 1. Patient is medically optimized at their current state for surgery. DVT Prophylaxis: Heparin gtt for  NEW A fib    - Will need initiation of AC given elevated CHADSVASc.- 5

## 2024-05-21 NOTE — DIETITIAN INITIAL EVALUATION ADULT - NS FNS DIET ORDER
Diet, NPO after Midnight:      NPO Start Date: 20-May-2024,   NPO Start Time: 23:59 (05-20-24 @ 14:36)  Diet, Consistent Carbohydrate Renal w/Evening Snack (05-20-24 @ 14:16)

## 2024-05-21 NOTE — PROGRESS NOTE ADULT - SUBJECTIVE AND OBJECTIVE BOX
Patient is a 58y old  Male who presents with a chief complaint of Right foot wound (21 May 2024 10:29)    Type: DX year known complications Endocrine Last seen Rx home Hx DKA/HHS, Glucometer checks, needs, weight, diet, exercise  diabetes education provided  Hx ASCVD, CKD, HF    HPI:  Patient is a 59yo M with a PMH of type 1 diabetes mellitus, HTN, HLD, CVA (), prior DVT (s/p Eliquis), recurrent right pleural effusion with VATS, renal transplant (), sent in from wound care for a non healing right foot wound. Patient has been following with outpatient wound care for the non healing wound on his right foot, and has noted that it has progressively gotten worse. He was sent in for a right  foot exostectomy for tomorrow 24 with Dr. Catherine. Patient notes that there has been a small amount of bloody drainage from the wound, but denies any pain, pus or foul smell. He is able to bear weight on his right foot and does not have any problems walking. He has peripheral neuropathy so he has decreased sensation in his b/l LE, but no tingling. Denies fever, chills, chest pain, palpitations, SOB, cough, abdominal pain, n/v/d/c, dysuria, hematuria, changes in vision, dizziness,       ED course:  Vitals: /74, HR 84, T 98.2, RR 16 SpO2 100% on RA   Labs: H/H 12.6/38.8, BUN/Cr 25/1.2  Given: IV Zosyn x1, IV Vancomycin 1g x1, 1L NaCl bolus x1   EKbpm, QTc 418, Afib      Imaging  Cxray: No acute finding or change.  Right Foot Xray:  Status post resection of the fifth ray at the level of the distal metatarsal and proximal phalanx. Status post resection of the second and third digits. No cortical erosion or periosteal reaction. There is a plantar calcaneal enthesophyte. Strength is present within the medial navicular.   (20 May 2024 12:52)      PAST MEDICAL & SURGICAL HISTORY:  Hypertension      Hyperlipidemia      Diabetes mellitus  Type 1 on insulin pump      CKD (chronic kidney disease)  Renal Transplant  at [facility]      Diabetic peripheral neuropathy      Obesity (BMI 30-39.9)      CVA (cerebral vascular accident)  Wallenberg Stroke  low L body sensation  low R face sensation  decreased peripheral vision  poor balance          Squamous cell carcinoma of skin of left ear  nose      History of DVT (deep vein thrombosis)  LLE , was on Eliquis x 6 months  Was hospitalized for Rhinovirus at the time, intubated      Recurrent squamous cell carcinoma of skin  nose, L arm      History of pleural effusion  right thoracentesis 2022 and 2022      CARMEN treated with BiPAP  2L O2 at night      History of restrictive lung disease      Toe infection   L 4th Toe surgery-amputation secondary to osteomyelitis   partial right 2nd 4th toe amputation      Ear disease  Left inner ear surgery, 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, Can NOT have MRI      Vasectomy status  s/p b/l  vasectomy      H/O foot surgery   - right foot - bone fracture - s/p ORIF and subsequent removal of hardware      End-stage renal failure with renal transplant  2013      S/P kidney transplant        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      History of loop recorder            Allergies    No Known Allergies    Intolerances        MEDICATIONS  (STANDING):  aspirin  chewable 81 milliGRAM(s) Oral daily  atorvastatin 10 milliGRAM(s) Oral at bedtime  azaTHIOprine 100 milliGRAM(s) Oral daily  carvedilol 12.5 milliGRAM(s) Oral every 12 hours  cefepime   IVPB 2000 milliGRAM(s) IV Intermittent every 8 hours  cloNIDine 0.2 milliGRAM(s) Oral three times a day  dextrose 10% Bolus 125 milliLiter(s) IV Bolus once  dextrose 5%. 1000 milliLiter(s) (50 mL/Hr) IV Continuous <Continuous>  dextrose 5%. 1000 milliLiter(s) (100 mL/Hr) IV Continuous <Continuous>  dextrose 50% Injectable 25 Gram(s) IV Push once  dextrose 50% Injectable 12.5 Gram(s) IV Push once  gabapentin 300 milliGRAM(s) Oral two times a day  glucagon  Injectable 1 milliGRAM(s) IntraMuscular once  heparin  Infusion.  Unit(s)/Hr (24 mL/Hr) IV Continuous <Continuous>  hydrALAZINE 100 milliGRAM(s) Oral three times a day  insulin lispro (HumaLOG) Pump 1 Each SubCutaneous Continuous Pump  lactated ringers. 1000 milliLiter(s) (100 mL/Hr) IV Continuous <Continuous>  losartan 25 milliGRAM(s) Oral at bedtime  NIFEdipine XL 30 milliGRAM(s) Oral two times a day  sodium chloride 0.9%. 1000 milliLiter(s) (100 mL/Hr) IV Continuous <Continuous>  tacrolimus 1.5 milliGRAM(s) Oral <User Schedule>  trimethoprim   80 mG/sulfamethoxazole 400 mG 1 Tablet(s) Oral daily       Patient is a 58y old  Male who presents with a chief complaint of Right foot wound (21 May 2024 10:29)    Type:1 DX 30 years.  known complications: DFTU. POCT in PACU 120 mg/dL- patient prefers to keep pump suspend mode- will resume when BG increases.       HPI:  Patient is a 59yo M with a PMH of type 1 diabetes mellitus, HTN, HLD, CVA (), prior DVT (s/p Eliquis), recurrent right pleural effusion with VATS, renal transplant (), sent in from wound care for a non healing right foot wound. Patient has been following with outpatient wound care for the non healing wound on his right foot, and has noted that it has progressively gotten worse. He was sent in for a right  foot exostectomy for tomorrow 24 with Dr. Catherine. Patient notes that there has been a small amount of bloody drainage from the wound, but denies any pain, pus or foul smell. He is able to bear weight on his right foot and does not have any problems walking. He has peripheral neuropathy so he has decreased sensation in his b/l LE, but no tingling. Denies fever, chills, chest pain, palpitations, SOB, cough, abdominal pain, n/v/d/c, dysuria, hematuria, changes in vision, dizziness,       ED course:  Vitals: /74, HR 84, T 98.2, RR 16 SpO2 100% on RA   Labs: H/H 12.6/38.8, BUN/Cr 25/1.2  Given: IV Zosyn x1, IV Vancomycin 1g x1, 1L NaCl bolus x1   EKbpm, QTc 418, Afib      Imaging  Cxray: No acute finding or change.  Right Foot Xray:  Status post resection of the fifth ray at the level of the distal metatarsal and proximal phalanx. Status post resection of the second and third digits. No cortical erosion or periosteal reaction. There is a plantar calcaneal enthesophyte. Strength is present within the medial navicular.   (20 May 2024 12:52)      PAST MEDICAL & SURGICAL HISTORY:  Hypertension      Hyperlipidemia      Diabetes mellitus  Type 1 on insulin pump      CKD (chronic kidney disease)  Renal Transplant  at [facility]      Diabetic peripheral neuropathy      Obesity (BMI 30-39.9)      CVA (cerebral vascular accident)  Wallenberg Stroke  low L body sensation  low R face sensation  decreased peripheral vision  poor balance          Squamous cell carcinoma of skin of left ear  nose      History of DVT (deep vein thrombosis)  LLE , was on Eliquis x 6 months  Was hospitalized for Rhinovirus at the time, intubated      Recurrent squamous cell carcinoma of skin  nose, L arm      History of pleural effusion  right thoracentesis 2022 and 2022      CARMEN treated with BiPAP  2L O2 at night      History of restrictive lung disease      Toe infection   L 4th Toe surgery-amputation secondary to osteomyelitis   partial right 2nd 4th toe amputation      Ear disease  Left inner ear surgery, 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, Can NOT have MRI      Vasectomy status  s/p b/l  vasectomy      H/O foot surgery   - right foot - bone fracture - s/p ORIF and subsequent removal of hardware      End-stage renal failure with renal transplant  2013      S/P kidney transplant        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      History of loop recorder            Allergies    No Known Allergies    Intolerances        MEDICATIONS  (STANDING):  aspirin  chewable 81 milliGRAM(s) Oral daily  atorvastatin 10 milliGRAM(s) Oral at bedtime  azaTHIOprine 100 milliGRAM(s) Oral daily  carvedilol 12.5 milliGRAM(s) Oral every 12 hours  cefepime   IVPB 2000 milliGRAM(s) IV Intermittent every 8 hours  cloNIDine 0.2 milliGRAM(s) Oral three times a day  dextrose 10% Bolus 125 milliLiter(s) IV Bolus once  dextrose 5%. 1000 milliLiter(s) (50 mL/Hr) IV Continuous <Continuous>  dextrose 5%. 1000 milliLiter(s) (100 mL/Hr) IV Continuous <Continuous>  dextrose 50% Injectable 25 Gram(s) IV Push once  dextrose 50% Injectable 12.5 Gram(s) IV Push once  gabapentin 300 milliGRAM(s) Oral two times a day  glucagon  Injectable 1 milliGRAM(s) IntraMuscular once  heparin  Infusion.  Unit(s)/Hr (24 mL/Hr) IV Continuous <Continuous>  hydrALAZINE 100 milliGRAM(s) Oral three times a day  insulin lispro (HumaLOG) Pump 1 Each SubCutaneous Continuous Pump  lactated ringers. 1000 milliLiter(s) (100 mL/Hr) IV Continuous <Continuous>  losartan 25 milliGRAM(s) Oral at bedtime  NIFEdipine XL 30 milliGRAM(s) Oral two times a day  sodium chloride 0.9%. 1000 milliLiter(s) (100 mL/Hr) IV Continuous <Continuous>  tacrolimus 1.5 milliGRAM(s) Oral <User Schedule>  trimethoprim   80 mG/sulfamethoxazole 400 mG 1 Tablet(s) Oral daily

## 2024-05-21 NOTE — PROGRESS NOTE ADULT - PROBLEM SELECTOR PLAN 5
Chronic   - /74 on admission  - Continue home Carvedilol 12.5mg BID   - Continue home Clonidine 0.2mg TID  - Continue home Nifedipine 30mg BID  - Continue home Hydralazine 100mg TID  - Continue home Losartan 25mg at bedtime  - Monitor routine hemodynamics   - Cardio consulted, f/u recs Chronic   - /74 on admission  - Continue home Carvedilol 12.5mg BID   - Continue home Clonidine 0.2mg TID  - Continue home Nifedipine 30mg BID  - Continue home Hydralazine 100mg TID  - Continue home Losartan 25mg at bedtime   - Cardio consulted dr saleh

## 2024-05-21 NOTE — PROGRESS NOTE ADULT - PROBLEM SELECTOR PLAN 3
Type 1 Diabetes w/ Insulin Pump and CGM. Insulin pump is replenished every 3 days. Follows Ramo Russell.   - Insulin pump last changed Sunday 5/19 --> needs to be replenished Tuesday night 5/21  - Insulin pump gives continuous 3u Insulin every hour  - Gets additional 5-10u insulin premeal   - Monitor fingersticks   - Consistent carb diet  - NPO after midnight   - Hypoglycemia protocol   - F/u A1c   - Continue home Gabapentin 300mg BID for diabetic neuropathy   - Ramo consulted/Pat Weil Consulted also, f/u recs Type 1 Diabetes w/ Insulin Pump and CGM. Insulin pump is replenished every 3 days. Follows Ramo Russell.   - Insulin pump last changed Sunday 5/19 --> needs to be replenished Tuesday night 5/21  - Insulin pump gives continuous 3u Insulin every hour  - Gets additional 5-10u insulin premeal   - Monitor fingersticks   - Consistent carb diet  - Hypoglycemia protocol   -  A1c 6.6  - Continue home Gabapentin 300mg BID for diabetic neuropathy   - Ramo consulted/Pat Weil Consulted also,

## 2024-05-21 NOTE — DIETITIAN INITIAL EVALUATION ADULT - PROBLEM SELECTOR PLAN 3
Type 1 Diabetes w/ Insulin Pump and CGM. Insulin pump is replenished every 3 days. Follows Ramo Russell.   - Insulin pump last changed Sunday 5/19 --> needs to be replenished Tuesday night 5/21  - Insulin pump gives continuous 3u Insulin every hour  - Gets additional 5-10u insulin premeal   - Monitor fingersticks   - Consistent carb diet  - NPO after midnight   - Hypoglycemia protocol   - F/u AM A1c   - Continue home Gabapentin 300mg BID for diabetic neuropathy   - Ramo consulted/Pat Weil Consulted also, f/u recs

## 2024-05-21 NOTE — CHART NOTE - NSCHARTNOTEFT_GEN_A_CORE
PHUONG/PVR's reviewed  No hemodynamically sig PAD  No vascular intervention indicated  Continue care as per podiatry and medical team  Will sign off

## 2024-05-21 NOTE — PROGRESS NOTE ADULT - PROBLEM SELECTOR PLAN 1
Type 1 A1c 6.6% adm DFU  Recommend endocrine-Perlman on consult  Insulin pump therapy to resumed by patient- pump attached by patient- currently on suspend mode awaiting BG increase.   FU appt: TBA  DSC recommendations: return to home humalog insulin pump therapy same settings regimen and CGM/glucose monitoring  diabetes education provided  Diabetes support info and cell # [phone] given   Goal 100-180 mg/dL; 140-180 mg/dL in critical care areas

## 2024-05-21 NOTE — PROGRESS NOTE ADULT - ASSESSMENT
Physical Exam:   Vital Signs Last 24 Hrs  T(C): 36.5 (21 May 2024 12:41), Max: 36.6 (21 May 2024 11:56)  T(F): 97.7 (21 May 2024 12:41), Max: 97.9 (21 May 2024 11:56)  HR: 87 (21 May 2024 12:56) (67 - 89)  BP: 141/58 (21 May 2024 12:56) (118/71 - 159/74)  BP(mean): --  RR: 14 (21 May 2024 12:41) (12 - 18)  SpO2: 95% (21 May 2024 12:41) (92% - 97%)    Parameters below as of 21 May 2024 11:56  Patient On (Oxygen Delivery Method): nasal cannula  O2 Flow (L/min): 3           CAPILLARY BLOOD GLUCOSE      POCT Blood Glucose.: 120 mg/dL (21 May 2024 12:00)  POCT Blood Glucose.: 99 mg/dL (21 May 2024 10:23)  POCT Blood Glucose.: 99 mg/dL (21 May 2024 08:17)  POCT Blood Glucose.: 94 mg/dL (20 May 2024 23:15)  POCT Blood Glucose.: 82 mg/dL (20 May 2024 17:37)      Cholesterol, Serum: 113 mg/dL (05.19.21 @ 08:36)     HDL Cholesterol, Serum: 22 mg/dL (05.19.21 @ 08:36)     LDL Cholesterol Calculated: 66 mg/dL (05.19.21 @ 08:36)     DIET: CC  >50%

## 2024-05-21 NOTE — DIETITIAN INITIAL EVALUATION ADULT - NSICDXPASTMEDICALHX_GEN_ALL_CORE_FT
PAST MEDICAL HISTORY:  CKD (chronic kidney disease) Renal Transplant 2013 at [facility]    CVA (cerebral vascular accident) Wallenberg Stroke  low L body sensation  low R face sensation  decreased peripheral vision  poor balance  2015      Diabetes mellitus Type 1 on insulin pump    Diabetic peripheral neuropathy     History of DVT (deep vein thrombosis) LLE 2016, was on Eliquis x 6 months  Was hospitalized for Rhinovirus at the time, intubated    History of pleural effusion right thoracentesis 6/2022 and 8/2022    History of restrictive lung disease     Hyperlipidemia     Hypertension     Obesity (BMI 30-39.9)     CARMEN treated with BiPAP 2L O2 at night    Recurrent squamous cell carcinoma of skin nose, L arm    Squamous cell carcinoma of skin of left ear nose

## 2024-05-22 LAB
ANION GAP SERPL CALC-SCNC: 4 MMOL/L — LOW (ref 5–17)
APTT BLD: 73.6 SEC — HIGH (ref 24.5–35.6)
BUN SERPL-MCNC: 19 MG/DL — SIGNIFICANT CHANGE UP (ref 7–23)
CALCIUM SERPL-MCNC: 8.8 MG/DL — SIGNIFICANT CHANGE UP (ref 8.5–10.1)
CHLORIDE SERPL-SCNC: 104 MMOL/L — SIGNIFICANT CHANGE UP (ref 96–108)
CO2 SERPL-SCNC: 31 MMOL/L — SIGNIFICANT CHANGE UP (ref 22–31)
CREAT SERPL-MCNC: 1.4 MG/DL — HIGH (ref 0.5–1.3)
CRP SERPL-MCNC: 63 MG/L — HIGH
EGFR: 59 ML/MIN/1.73M2 — LOW
ERYTHROCYTE [SEDIMENTATION RATE] IN BLOOD: 22 MM/HR — HIGH (ref 0–20)
GLUCOSE SERPL-MCNC: 107 MG/DL — HIGH (ref 70–99)
GRAM STN FLD: SIGNIFICANT CHANGE UP
HCT VFR BLD CALC: 39.1 % — SIGNIFICANT CHANGE UP (ref 39–50)
HGB BLD-MCNC: 12.9 G/DL — LOW (ref 13–17)
MAGNESIUM SERPL-MCNC: 2 MG/DL — SIGNIFICANT CHANGE UP (ref 1.6–2.6)
MCHC RBC-ENTMCNC: 29.7 PG — SIGNIFICANT CHANGE UP (ref 27–34)
MCHC RBC-ENTMCNC: 33 GM/DL — SIGNIFICANT CHANGE UP (ref 32–36)
MCV RBC AUTO: 90.1 FL — SIGNIFICANT CHANGE UP (ref 80–100)
NRBC # BLD: 0 /100 WBCS — SIGNIFICANT CHANGE UP (ref 0–0)
PHOSPHATE SERPL-MCNC: 3.6 MG/DL — SIGNIFICANT CHANGE UP (ref 2.5–4.5)
PLATELET # BLD AUTO: 181 K/UL — SIGNIFICANT CHANGE UP (ref 150–400)
POTASSIUM SERPL-MCNC: 3.9 MMOL/L — SIGNIFICANT CHANGE UP (ref 3.5–5.3)
POTASSIUM SERPL-SCNC: 3.9 MMOL/L — SIGNIFICANT CHANGE UP (ref 3.5–5.3)
RBC # BLD: 4.34 M/UL — SIGNIFICANT CHANGE UP (ref 4.2–5.8)
RBC # FLD: 13.8 % — SIGNIFICANT CHANGE UP (ref 10.3–14.5)
SODIUM SERPL-SCNC: 139 MMOL/L — SIGNIFICANT CHANGE UP (ref 135–145)
SPECIMEN SOURCE: SIGNIFICANT CHANGE UP
WBC # BLD: 8.44 K/UL — SIGNIFICANT CHANGE UP (ref 3.8–10.5)
WBC # FLD AUTO: 8.44 K/UL — SIGNIFICANT CHANGE UP (ref 3.8–10.5)

## 2024-05-22 PROCEDURE — 99233 SBSQ HOSP IP/OBS HIGH 50: CPT

## 2024-05-22 PROCEDURE — 99232 SBSQ HOSP IP/OBS MODERATE 35: CPT

## 2024-05-22 RX ORDER — APIXABAN 2.5 MG/1
5 TABLET, FILM COATED ORAL
Refills: 0 | Status: DISCONTINUED | OUTPATIENT
Start: 2024-05-22 | End: 2024-05-25

## 2024-05-22 RX ADMIN — Medication 30 MILLIGRAM(S): at 17:54

## 2024-05-22 RX ADMIN — GABAPENTIN 300 MILLIGRAM(S): 400 CAPSULE ORAL at 06:28

## 2024-05-22 RX ADMIN — Medication 650 MILLIGRAM(S): at 11:31

## 2024-05-22 RX ADMIN — LOSARTAN POTASSIUM 25 MILLIGRAM(S): 100 TABLET, FILM COATED ORAL at 21:11

## 2024-05-22 RX ADMIN — GABAPENTIN 300 MILLIGRAM(S): 400 CAPSULE ORAL at 17:54

## 2024-05-22 RX ADMIN — Medication 0.2 MILLIGRAM(S): at 06:28

## 2024-05-22 RX ADMIN — Medication 100 MILLIGRAM(S): at 21:12

## 2024-05-22 RX ADMIN — HEPARIN SODIUM 2000 UNIT(S)/HR: 5000 INJECTION INTRAVENOUS; SUBCUTANEOUS at 02:08

## 2024-05-22 RX ADMIN — Medication 30 MILLIGRAM(S): at 06:28

## 2024-05-22 RX ADMIN — Medication 0.2 MILLIGRAM(S): at 13:27

## 2024-05-22 RX ADMIN — Medication 0.2 MILLIGRAM(S): at 21:11

## 2024-05-22 RX ADMIN — PIPERACILLIN AND TAZOBACTAM 25 GRAM(S): 4; .5 INJECTION, POWDER, LYOPHILIZED, FOR SOLUTION INTRAVENOUS at 00:39

## 2024-05-22 RX ADMIN — TACROLIMUS 1.5 MILLIGRAM(S): 5 CAPSULE ORAL at 09:10

## 2024-05-22 RX ADMIN — CARVEDILOL PHOSPHATE 12.5 MILLIGRAM(S): 80 CAPSULE, EXTENDED RELEASE ORAL at 06:28

## 2024-05-22 RX ADMIN — HEPARIN SODIUM 2000 UNIT(S)/HR: 5000 INJECTION INTRAVENOUS; SUBCUTANEOUS at 09:36

## 2024-05-22 RX ADMIN — CARVEDILOL PHOSPHATE 12.5 MILLIGRAM(S): 80 CAPSULE, EXTENDED RELEASE ORAL at 17:54

## 2024-05-22 RX ADMIN — HEPARIN SODIUM 2000 UNIT(S)/HR: 5000 INJECTION INTRAVENOUS; SUBCUTANEOUS at 07:10

## 2024-05-22 RX ADMIN — AZATHIOPRINE 100 MILLIGRAM(S): 100 TABLET ORAL at 13:27

## 2024-05-22 RX ADMIN — PIPERACILLIN AND TAZOBACTAM 25 GRAM(S): 4; .5 INJECTION, POWDER, LYOPHILIZED, FOR SOLUTION INTRAVENOUS at 06:26

## 2024-05-22 RX ADMIN — APIXABAN 5 MILLIGRAM(S): 2.5 TABLET, FILM COATED ORAL at 21:11

## 2024-05-22 RX ADMIN — Medication 100 MILLIGRAM(S): at 06:28

## 2024-05-22 RX ADMIN — Medication 650 MILLIGRAM(S): at 12:31

## 2024-05-22 RX ADMIN — Medication 100 MILLIGRAM(S): at 13:27

## 2024-05-22 RX ADMIN — Medication 81 MILLIGRAM(S): at 13:27

## 2024-05-22 RX ADMIN — APIXABAN 5 MILLIGRAM(S): 2.5 TABLET, FILM COATED ORAL at 11:28

## 2024-05-22 RX ADMIN — PIPERACILLIN AND TAZOBACTAM 25 GRAM(S): 4; .5 INJECTION, POWDER, LYOPHILIZED, FOR SOLUTION INTRAVENOUS at 13:26

## 2024-05-22 RX ADMIN — PIPERACILLIN AND TAZOBACTAM 25 GRAM(S): 4; .5 INJECTION, POWDER, LYOPHILIZED, FOR SOLUTION INTRAVENOUS at 21:12

## 2024-05-22 RX ADMIN — TACROLIMUS 1.5 MILLIGRAM(S): 5 CAPSULE ORAL at 21:12

## 2024-05-22 RX ADMIN — Medication 1 TABLET(S): at 13:27

## 2024-05-22 RX ADMIN — ATORVASTATIN CALCIUM 10 MILLIGRAM(S): 80 TABLET, FILM COATED ORAL at 21:11

## 2024-05-22 NOTE — PROGRESS NOTE ADULT - SUBJECTIVE AND OBJECTIVE BOX
58y year old Male seen at [facility] 3WES 366 W1 s/p right foot exostectomy Patient relates no overnight events and states that they are doing well at this time.  Denies any fever, chills, nausea, vomiting, chest pain, shortness of breath, or calf pain at this time.    Allergies    No Known Allergies    Intolerances        MEDICATIONS  (STANDING):  apixaban 5 milliGRAM(s) Oral two times a day  aspirin  chewable 81 milliGRAM(s) Oral daily  atorvastatin 10 milliGRAM(s) Oral at bedtime  azaTHIOprine 100 milliGRAM(s) Oral daily  carvedilol 12.5 milliGRAM(s) Oral every 12 hours  cloNIDine 0.2 milliGRAM(s) Oral three times a day  dextrose 10% Bolus 125 milliLiter(s) IV Bolus once  dextrose 5%. 1000 milliLiter(s) (50 mL/Hr) IV Continuous <Continuous>  dextrose 5%. 1000 milliLiter(s) (100 mL/Hr) IV Continuous <Continuous>  dextrose 50% Injectable 12.5 Gram(s) IV Push once  dextrose 50% Injectable 25 Gram(s) IV Push once  gabapentin 300 milliGRAM(s) Oral two times a day  glucagon  Injectable 1 milliGRAM(s) IntraMuscular once  hydrALAZINE 100 milliGRAM(s) Oral three times a day  insulin lispro (HumaLOG) Pump 1 Each SubCutaneous Continuous Pump  losartan 25 milliGRAM(s) Oral at bedtime  NIFEdipine XL 30 milliGRAM(s) Oral two times a day  piperacillin/tazobactam IVPB.. 3.375 Gram(s) IV Intermittent every 8 hours  sodium chloride 0.9%. 1000 milliLiter(s) (100 mL/Hr) IV Continuous <Continuous>  tacrolimus 1.5 milliGRAM(s) Oral <User Schedule>  trimethoprim   80 mG/sulfamethoxazole 400 mG 1 Tablet(s) Oral daily    MEDICATIONS  (PRN):  acetaminophen     Tablet .. 650 milliGRAM(s) Oral every 6 hours PRN Temp greater or equal to 38C (100.4F), Mild Pain (1 - 3)  aluminum hydroxide/magnesium hydroxide/simethicone Suspension 30 milliLiter(s) Oral every 4 hours PRN Dyspepsia  dextrose Oral Gel 15 Gram(s) Oral once PRN Blood Glucose LESS THAN 70 milliGRAM(s)/deciliter  melatonin 3 milliGRAM(s) Oral at bedtime PRN Insomnia  ondansetron Injectable 4 milliGRAM(s) IV Push every 8 hours PRN Nausea and/or Vomiting      Vital Signs Last 24 Hrs  T(C): 36.6 (22 May 2024 04:45), Max: 37 (21 May 2024 20:25)  T(F): 97.8 (22 May 2024 04:45), Max: 98.6 (21 May 2024 20:25)  HR: 74 (22 May 2024 04:45) (74 - 89)  BP: 134/70 (22 May 2024 04:45) (125/52 - 159/74)  BP(mean): --  RR: 17 (22 May 2024 04:45) (12 - 18)  SpO2: 96% (22 May 2024 04:45) (93% - 96%)    Parameters below as of 22 May 2024 04:45  Patient On (Oxygen Delivery Method): BiPAP/CPAP        PHYSICAL EXAM:  Vascular: DP & PT palpable bilaterally, Capillary refill 3 seconds, Digital hair absent bilaterally  Neurological: Light touch sensation diminished b/l   Musculoskeletal: 5/5 strength in all quadrants bilaterally, AJ & STJ ROM intact  Dermatological: s/p right foot exostectomy                          12.9   8.44  )-----------( 181      ( 22 May 2024 08:35 )             39.1       05-22    139  |  104  |  19  ----------------------------<  107<H>  3.9   |  31  |  1.40<H>    Ca    8.8      22 May 2024 08:35  Phos  3.6     05-22  Mg     2.0     05-22    TPro  6.8  /  Alb  3.0<L>  /  TBili  0.5  /  DBili  x   /  AST  19  /  ALT  18  /  AlkPhos  94  05-21      PTT - ( 22 May 2024 00:54 )  PTT:73.6 sec      Culture - Tissue with Gram Stain (collected 21 May 2024 11:30)  Source: .Tissue Other, RIGHT FOOT BONE CULTURE  Gram Stain (22 May 2024 02:54):    No polymorphonuclear cells seen per low power field    No organisms seen per oil power field    Culture - Blood (collected 20 May 2024 10:10)  Source: .Blood Blood-Peripheral  Preliminary Report (21 May 2024 16:02):    No growth at 24 hours    Culture - Blood (collected 20 May 2024 10:05)  Source: .Blood Blood-Peripheral  Preliminary Report (21 May 2024 16:02):    No growth at 24 hours        Imaging: ----------   58y year old Male seen at [facility] 3WES 366 W1 s/p right foot exostectomy Patient relates no overnight events and states that they are doing well at this time.  Denies any fever, chills, nausea, vomiting, chest pain, shortness of breath, or calf pain at this time.    Allergies    No Known Allergies    Intolerances        MEDICATIONS  (STANDING):  apixaban 5 milliGRAM(s) Oral two times a day  aspirin  chewable 81 milliGRAM(s) Oral daily  atorvastatin 10 milliGRAM(s) Oral at bedtime  azaTHIOprine 100 milliGRAM(s) Oral daily  carvedilol 12.5 milliGRAM(s) Oral every 12 hours  cloNIDine 0.2 milliGRAM(s) Oral three times a day  dextrose 10% Bolus 125 milliLiter(s) IV Bolus once  dextrose 5%. 1000 milliLiter(s) (50 mL/Hr) IV Continuous <Continuous>  dextrose 5%. 1000 milliLiter(s) (100 mL/Hr) IV Continuous <Continuous>  dextrose 50% Injectable 12.5 Gram(s) IV Push once  dextrose 50% Injectable 25 Gram(s) IV Push once  gabapentin 300 milliGRAM(s) Oral two times a day  glucagon  Injectable 1 milliGRAM(s) IntraMuscular once  hydrALAZINE 100 milliGRAM(s) Oral three times a day  insulin lispro (HumaLOG) Pump 1 Each SubCutaneous Continuous Pump  losartan 25 milliGRAM(s) Oral at bedtime  NIFEdipine XL 30 milliGRAM(s) Oral two times a day  piperacillin/tazobactam IVPB.. 3.375 Gram(s) IV Intermittent every 8 hours  sodium chloride 0.9%. 1000 milliLiter(s) (100 mL/Hr) IV Continuous <Continuous>  tacrolimus 1.5 milliGRAM(s) Oral <User Schedule>  trimethoprim   80 mG/sulfamethoxazole 400 mG 1 Tablet(s) Oral daily    MEDICATIONS  (PRN):  acetaminophen     Tablet .. 650 milliGRAM(s) Oral every 6 hours PRN Temp greater or equal to 38C (100.4F), Mild Pain (1 - 3)  aluminum hydroxide/magnesium hydroxide/simethicone Suspension 30 milliLiter(s) Oral every 4 hours PRN Dyspepsia  dextrose Oral Gel 15 Gram(s) Oral once PRN Blood Glucose LESS THAN 70 milliGRAM(s)/deciliter  melatonin 3 milliGRAM(s) Oral at bedtime PRN Insomnia  ondansetron Injectable 4 milliGRAM(s) IV Push every 8 hours PRN Nausea and/or Vomiting      Vital Signs Last 24 Hrs  T(C): 36.6 (22 May 2024 04:45), Max: 37 (21 May 2024 20:25)  T(F): 97.8 (22 May 2024 04:45), Max: 98.6 (21 May 2024 20:25)  HR: 74 (22 May 2024 04:45) (74 - 89)  BP: 134/70 (22 May 2024 04:45) (125/52 - 159/74)  BP(mean): --  RR: 17 (22 May 2024 04:45) (12 - 18)  SpO2: 96% (22 May 2024 04:45) (93% - 96%)    Parameters below as of 22 May 2024 04:45  Patient On (Oxygen Delivery Method): BiPAP/CPAP        PHYSICAL EXAM:  Vascular: DP & PT palpable bilaterally, Capillary refill 3 seconds, Digital hair absent bilaterally  Neurological: Light touch sensation diminished b/l   Musculoskeletal: 5/5 strength in all quadrants bilaterally, AJ & STJ ROM intact  Dermatological: s/p right foot exostectomy.  Incision site is clean, sutures intact no dehiscence   2.0cm x 1.0cm x down to bone with tracking noted to the wound ,(+)  probe to bone, mild  periwound erythema, no fluctuance, no malodor, no proximal streaking at this time                          12.9   8.44  )-----------( 181      ( 22 May 2024 08:35 )             39.1       05-22    139  |  104  |  19  ----------------------------<  107<H>  3.9   |  31  |  1.40<H>    Ca    8.8      22 May 2024 08:35  Phos  3.6     05-22  Mg     2.0     05-22    TPro  6.8  /  Alb  3.0<L>  /  TBili  0.5  /  DBili  x   /  AST  19  /  ALT  18  /  AlkPhos  94  05-21      PTT - ( 22 May 2024 00:54 )  PTT:73.6 sec      Culture - Tissue with Gram Stain (collected 21 May 2024 11:30)  Source: .Tissue Other, RIGHT FOOT BONE CULTURE  Gram Stain (22 May 2024 02:54):    No polymorphonuclear cells seen per low power field    No organisms seen per oil power field    Culture - Blood (collected 20 May 2024 10:10)  Source: .Blood Blood-Peripheral  Preliminary Report (21 May 2024 16:02):    No growth at 24 hours    Culture - Blood (collected 20 May 2024 10:05)  Source: .Blood Blood-Peripheral  Preliminary Report (21 May 2024 16:02):    No growth at 24 hours        Imaging: ----------

## 2024-05-22 NOTE — PROVIDER CONTACT NOTE (OTHER) - ACTION/TREATMENT ORDERED:
NP notified   PRN tylenol to be administered and monitor temp
called resident to check with Dr. Castro
Per Dr. Caraballo go by last ptt generated by PACU   1816 to be next drawn ptt for heparin adjustment

## 2024-05-22 NOTE — PROGRESS NOTE ADULT - ASSESSMENT
Patient is a 59yo M with a PMH of type 1 diabetes mellitus, HTN, HLD, CVA (2015), prior DVT (s/p Eliquis), recurrent right pleural effusion with VATS, renal transplant (2013), sent in from wound care for a non healing right foot wound.     R foot wound  - Right Foot Xray:  Status post resection of the fifth ray at the level of the distal metatarsal and proximal phalanx. Status post resection of the second and third digits. No cortical erosion or periosteal reaction. There is a plantar calcaneal enthesophyte. Strength is present within the medial navicular.  - Prior WCx E. faecalis from 6/30/22  - BCx NGTD  - OR cx NGTD  - S/p Exostectomy of right foot 21-May-2024 11:57:39  Donte Catherine of renal txp in 2013    Recommendations:  C/w zosyn for coverage  F/u pending cx--BCx, WCx and TCx  C/w bactrim for ppx in renal txp pt  Trend temps/WBC  Appreciate podiatry recs  Additional care per primary team    Infectious Diseases will follow. Please call with any questions.  Claire Castro M.D.  [facility] Division of Infectious Diseases [phone]  For after 5 P.M. and weekends, please call [phone]

## 2024-05-22 NOTE — PROGRESS NOTE ADULT - SUBJECTIVE AND OBJECTIVE BOX
[facility], Division of Infectious Diseases  MEL Mckoy Y. Patel, S. Shah, G. [facility]  [phone]    Name: LUTHER NORIEGA  Age: 58y  Gender: Male  MRN: [id number]    Interval History:  Patient seen and examined at bedside  No acute overnight events. Afebrile  No complaints  Notes reviewed    Antibiotics:  piperacillin/tazobactam IVPB.. 3.375 Gram(s) IV Intermittent every 8 hours  trimethoprim   80 mG/sulfamethoxazole 400 mG 1 Tablet(s) Oral daily      Medications:  acetaminophen     Tablet .. 650 milliGRAM(s) Oral every 6 hours PRN  aluminum hydroxide/magnesium hydroxide/simethicone Suspension 30 milliLiter(s) Oral every 4 hours PRN  apixaban 5 milliGRAM(s) Oral two times a day  aspirin  chewable 81 milliGRAM(s) Oral daily  atorvastatin 10 milliGRAM(s) Oral at bedtime  azaTHIOprine 100 milliGRAM(s) Oral daily  carvedilol 12.5 milliGRAM(s) Oral every 12 hours  cloNIDine 0.2 milliGRAM(s) Oral three times a day  dextrose 10% Bolus 125 milliLiter(s) IV Bolus once  dextrose 5%. 1000 milliLiter(s) IV Continuous <Continuous>  dextrose 5%. 1000 milliLiter(s) IV Continuous <Continuous>  dextrose 50% Injectable 12.5 Gram(s) IV Push once  dextrose 50% Injectable 25 Gram(s) IV Push once  dextrose Oral Gel 15 Gram(s) Oral once PRN  gabapentin 300 milliGRAM(s) Oral two times a day  glucagon  Injectable 1 milliGRAM(s) IntraMuscular once  hydrALAZINE 100 milliGRAM(s) Oral three times a day  insulin lispro (HumaLOG) Pump 1 Each SubCutaneous Continuous Pump  losartan 25 milliGRAM(s) Oral at bedtime  melatonin 3 milliGRAM(s) Oral at bedtime PRN  NIFEdipine XL 30 milliGRAM(s) Oral two times a day  ondansetron Injectable 4 milliGRAM(s) IV Push every 8 hours PRN  piperacillin/tazobactam IVPB.. 3.375 Gram(s) IV Intermittent every 8 hours  sodium chloride 0.9%. 1000 milliLiter(s) IV Continuous <Continuous>  tacrolimus 1.5 milliGRAM(s) Oral <User Schedule>  trimethoprim   80 mG/sulfamethoxazole 400 mG 1 Tablet(s) Oral daily      Review of Systems:  A 10-point review of systems was obtained.  Review of systems otherwise negative except as previously noted.    Allergies: No Known Allergies    For details regarding the patient's past medical history, social history, family history, and other miscellaneous elements, please refer the initial infectious diseases consultation and/or the admitting history and physical examination for this admission.    Objective:  Vitals:   T(C): 36.6 (05-22-24 @ 04:45), Max: 37 (05-21-24 @ 20:25)  HR: 74 (05-22-24 @ 04:45) (72 - 89)  BP: 134/70 (05-22-24 @ 04:45) (125/52 - 159/74)  RR: 17 (05-22-24 @ 04:45) (12 - 18)  SpO2: 96% (05-22-24 @ 04:45) (93% - 96%)    Physical Examination:  General: no acute distress  HEENT: NC/AT, EOMI,   Cardio: S1, S2 heard, RRR, no murmurs  Resp: breath sounds heard bilaterally, no rales, wheezes or rhonchi  Abd: soft, NT, ND,  Ext: no edema or cyanosis, foot at dressing  Skin: warm, dry, no visible rash      Laboratory Studies:  CBC:                       12.9   8.44  )-----------( 181      ( 22 May 2024 08:35 )             39.1     CMP: 05-22    139  |  104  |  19  ----------------------------<  107<H>  3.9   |  31  |  1.40<H>    Ca    8.8      22 May 2024 08:35  Phos  3.6     05-22  Mg     2.0     05-22    TPro  6.8  /  Alb  3.0<L>  /  TBili  0.5  /  DBili  x   /  AST  19  /  ALT  18  /  AlkPhos  94  05-21    LIVER FUNCTIONS - ( 21 May 2024 08:00 )  Alb: 3.0 g/dL / Pro: 6.8 g/dL / ALK PHOS: 94 U/L / ALT: 18 U/L / AST: 19 U/L / GGT: x           Urinalysis Basic - ( 22 May 2024 08:35 )    Color: x / Appearance: x / SG: x / pH: x  Gluc: 107 mg/dL / Ketone: x  / Bili: x / Urobili: x   Blood: x / Protein: x / Nitrite: x   Leuk Esterase: x / RBC: x / WBC x   Sq Epi: x / Non Sq Epi: x / Bacteria: x        Microbiology: reviewed    Culture - Tissue with Gram Stain (collected 05-21-24 @ 11:30)  Source: .Tissue Other, RIGHT FOOT BONE CULTURE  Gram Stain (05-22-24 @ 02:54):    No polymorphonuclear cells seen per low power field    No organisms seen per oil power field    Culture - Blood (collected 05-20-24 @ 10:10)  Source: .Blood Blood-Peripheral  Preliminary Report (05-21-24 @ 16:02):    No growth at 24 hours    Culture - Blood (collected 05-20-24 @ 10:05)  Source: .Blood Blood-Peripheral  Preliminary Report (05-21-24 @ 16:02):    No growth at 24 hours          Radiology: reviewed

## 2024-05-22 NOTE — PROGRESS NOTE ADULT - ASSESSMENT
Patient is a 57yo M with a PMH of type 1 diabetes mellitus, HTN, HLD, CVA (2015), prior DVT (s/p Eliquis), recurrent right pleural effusion with VATS, renal transplant (2013), sent in from wound care for a non healing right foot wound.

## 2024-05-22 NOTE — PROGRESS NOTE ADULT - ASSESSMENT
59yo M with a PMH of type 1 diabetes mellitus, HTN, HLD, CVA (2015), prior DVT (s/p Eliquis ~ 9 years ago), recurrent right pleural effusion with VATS, renal transplant (2013), sent in from wound care for a non healing right foot wound.     Cardiac optimization, New onset Afib   - presented from wound care center for non healing right foot wound, s/p right foot exostectomy with Dr. Catherine (5/21/24) tolerated well from CV POV, pending path/ cx   - on abx per ID following   - EKG : Afib 60 bpm, no acute ischemia noted, new from prior read  - no anginal complaints  - continue ASA and statin     - New onset Afib, unknown duration  - CHADVasc: 5   - continue hep gtt (Full AC protocol) for thromboembolic prevention, would switch to NOAC if no other planned procedure     - telemetry: Afib 70's no events x 48 hours, can dc tele in AM if no events overnight    - BP controlled (hx of longstanding HTN)  - continue home anti hypertensives as tolerated by blood pressure: Coreg, Clonidine, hydralazine, losartan, nifedipine   - Monitor and replete Lytes. Keep K > 4 and Mg > 2    - CXR: neg for acute process  - TTE: (2022) normal EF 65 %  - TTE (5/2024) normaL EF 58 %   - has chronic b/l LE edema, otherwise no sign of volume overload on exam    - Will continue to follow.    Flor Montes [facility]  Nurse Practitioner - Cardiology   call TEAMS   57yo M with a PMH of type 1 diabetes mellitus, HTN, HLD, CVA (2015), prior DVT (s/p Eliquis ~ 9 years ago), recurrent right pleural effusion with VATS, renal transplant (2013), sent in from wound care for a non healing right foot wound.     Cardiac optimization, New onset Afib   - presented from wound care center for non healing right foot wound, s/p right foot exostectomy with Dr. Catherine (5/21/24) tolerated well from CV POV, pending path/ cx   - on abx per ID following   - EKG : Afib 60 bpm, no acute ischemia noted, new from prior read  - no anginal complaints  - continue ASA and statin     - New onset Afib, unknown duration  - CHADVasc: 5   - continue hep gtt (Full AC protocol) for thromboembolic prevention, would switch to NOAC if no other planned procedure     - telemetry: Afib 70's no events x 48 hours, can dc tele in AM if no events overnight   - BP controlled (hx of longstanding HTN)  - continue home anti hypertensives as tolerated by blood pressure: Coreg, Clonidine, hydralazine, losartan, nifedipine   - Monitor and replete Lytes. Keep K > 4 and Mg > 2    - CXR: neg for acute process  - TTE: (2022) normal EF 65 %  - TTE (5/2024) normaL EF 58 %   - has chronic b/l LE edema, otherwise no sign of volume overload on exam    - follows with Dr. Kelly (o/p cardiologist)   - Will continue to follow.    Flor Montes [facility]  Nurse Practitioner - Cardiology   call TEAMS

## 2024-05-22 NOTE — PHYSICAL THERAPY INITIAL EVALUATION ADULT - PERTINENT HX OF CURRENT PROBLEM, REHAB EVAL
Patient is a 59yo M adm 5/20 with a PMH of type 1 diabetes mellitus, HTN, HLD, CVA (2015), prior DVT (s/p Eliquis), recurrent right pleural effusion with VATS, renal transplant (2013), sent in from wound care for a non healing right foot wound. s/p R foot exostectomy 5/21, NWB RLE

## 2024-05-22 NOTE — PROVIDER CONTACT NOTE (OTHER) - SITUATION
Patient has a Heparin drip and planned surgery for tomorrow. No order to stop heparin drip in the AM.
Pt restarted on heparin drip in PACU  ptt for this new order set generated for 1816
Pt with temp of 100.4

## 2024-05-22 NOTE — PROGRESS NOTE ADULT - SUBJECTIVE AND OBJECTIVE BOX
[facility] -- Luly Egan Pannella, Patel, Savella, Goodger, Cohen  Office # [phone]    Follow Up:  Cardiac optimization     Subjective/Observations: seen and examined, asleep but easily awaken, resting comfortably in bed, denies chest pain, dyspnea. Tolerating room air. hep gtt infusing, overnight events noted     REVIEW OF SYSTEMS: All other review of systems is negative unless indicated above  PAST MEDICAL & SURGICAL HISTORY:  Hypertension      Hyperlipidemia      Diabetes mellitus  Type 1 on insulin pump      CKD (chronic kidney disease)  Renal Transplant 2013 at [facility]      Diabetic peripheral neuropathy      Obesity (BMI 30-39.9)      CVA (cerebral vascular accident)  Wallenberg Stroke  low L body sensation  low R face sensation  decreased peripheral vision  poor balance  2015        Squamous cell carcinoma of skin of left ear  nose      History of DVT (deep vein thrombosis)  LLE 2016, was on Eliquis x 6 months  Was hospitalized for Rhinovirus at the time, intubated      Recurrent squamous cell carcinoma of skin  nose, L arm      History of pleural effusion  right thoracentesis 6/2022 and 8/2022      CARMEN treated with BiPAP  2L O2 at night      History of restrictive lung disease      Toe infection  2007 L 4th Toe surgery-amputation secondary to osteomyelitis  2019 partial right 2nd 4th toe amputation      Ear disease  Left inner ear surgery, 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, Can NOT have MRI      Vasectomy status  s/p b/l  vasectomy      H/O foot surgery  2005 - right foot - bone fracture - s/p ORIF and subsequent removal of hardware      End-stage renal failure with renal transplant  12/2013      S/P kidney transplant  2013      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 1000 milliLiter(s) (50 mL/Hr) IV Continuous <Continuous>  dextrose 5%. 1000 milliLiter(s) (100 mL/Hr) IV Continuous <Continuous>  dextrose 50% Injectable 12.5 Gram(s) IV Push once  dextrose 50% Injectable 25 Gram(s) IV Push once  gabapentin 300 milliGRAM(s) Oral two times a day  glucagon  Injectable 1 milliGRAM(s) IntraMuscular once  heparin  Infusion.  Unit(s)/Hr (24 mL/Hr) IV Continuous <Continuous>  hydrALAZINE 100 milliGRAM(s) Oral three times a day  insulin lispro (HumaLOG) Pump 1 Each SubCutaneous Continuous Pump  losartan 25 milliGRAM(s) Oral at bedtime  NIFEdipine XL 30 milliGRAM(s) Oral two times a day  piperacillin/tazobactam IVPB.. 3.375 Gram(s) IV Intermittent every 8 hours  sodium chloride 0.9%. 1000 milliLiter(s) (100 mL/Hr) IV Continuous <Continuous>  tacrolimus 1.5 milliGRAM(s) Oral <User Schedule>  trimethoprim   80 mG/sulfamethoxazole 400 mG 1 Tablet(s) Oral daily    MEDICATIONS  (PRN):  acetaminophen     Tablet .. 650 milliGRAM(s) Oral every 6 hours PRN Temp greater or equal to 38C (100.4F), Mild Pain (1 - 3)  aluminum hydroxide/magnesium hydroxide/simethicone Suspension 30 milliLiter(s) Oral every 4 hours PRN Dyspepsia  dextrose Oral Gel 15 Gram(s) Oral once PRN Blood Glucose LESS THAN 70 milliGRAM(s)/deciliter  heparin   Injectable 5000 Unit(s) IV Push every 6 hours PRN For aPTT between 40 - 57  heparin   Injectable 50621 Unit(s) IV Push every 6 hours PRN For aPTT less than 40  melatonin 3 milliGRAM(s) Oral at bedtime PRN Insomnia  ondansetron Injectable 4 milliGRAM(s) IV Push every 8 hours PRN Nausea and/or Vomiting    Allergies    No Known Allergies    Intolerances      Vital Signs Last 24 Hrs  T(C): 36.6 (22 May 2024 04:45), Max: 37 (21 May 2024 20:25)  T(F): 97.8 (22 May 2024 04:45), Max: 98.6 (21 May 2024 20:25)  HR: 74 (22 May 2024 04:45) (72 - 89)  BP: 134/70 (22 May 2024 04:45) (125/52 - 159/74)  BP(mean): --  RR: 17 (22 May 2024 04:45) (12 - 18)  SpO2: 96% (22 May 2024 04:45) (93% - 96%)    Parameters below as of 22 May 2024 04:45  Patient On (Oxygen Delivery Method): BiPAP/CPAP      I&O's Summary    21 May 2024 07:01  -  22 May 2024 07:00  --------------------------------------------------------  IN: 0 mL / OUT: 1300 mL / NET: -1300 mL       TELE: Afib 70's   PHYSICAL EXAM:  Constitutional: NAD, awake and alert  HEENT: Moist Mucous Membranes, Anicteric  Pulmonary: Non-labored, breath sounds are clear bilaterally, No wheezing, rales or rhonchi  Cardiovascular: IRRR, S1 and S2, No murmurs, rubs, gallops or clicks  Gastrointestinal: Bowel Sounds present, soft, nontender.   Lymph: + +b/l peripheral edema. No lymphadenopathy.  Skin: RLE wound with dressing intact  Psych:  Mood & affect appropriate  LABS: All Labs Reviewed:                        12.9   8.44  )-----------( 181      ( 22 May 2024 08:35 )             39.1                         13.3   9.43  )-----------( 197      ( 21 May 2024 20:26 )             39.4                         13.0   6.66  )-----------( 199      ( 21 May 2024 08:00 )             39.7     21 May 2024 08:00    141    |  107    |  14     ----------------------------<  103    4.1     |  32     |  1.10   20 May 2024 10:10    139    |  108    |  25     ----------------------------<  161    3.9     |  29     |  1.20     Ca    9.0        21 May 2024 08:00  Ca    8.8        20 May 2024 10:10    TPro  6.8    /  Alb  3.0    /  TBili  0.5    /  DBili  x      /  AST  19     /  ALT  18     /  AlkPhos  94     21 May 2024 08:00  TPro  6.9    /  Alb  3.2    /  TBili  0.5    /  DBili  x      /  AST  13     /  ALT  19     /  AlkPhos  93     20 May 2024 10:10    PT/INR - ( 20 May 2024 10:10 )   PT: 10.8 sec;   INR: 0.92 ratio         PTT - ( 22 May 2024 00:54 )  PTT:73.6 sec      12 Lead ECG:   Ventricular Rate 70 BPM    Atrial Rate 110 BPM    QRS Duration 100 ms    Q-T Interval 400 ms    QTC Calculation(Bazett) 432 ms    R Axis -4 degrees    T Axis 19 degrees    Diagnosis Line Atrial fibrillation  Abnormal ECG  When compared with ECG of 20-MAY-2024 10:00,  No significant change was found  Confirmed by Marko Mauricio MD ([id number]) on 5/21/2024 12:46:31 PM (05-21-24 @ 09:12)      TRANSTHORACIC ECHOCARDIOGRAM REPORT  ________________________________________________________________________________                                      _______       Pt. Name:       LUTHER NORIEGA Study Date:    5/20/2024  MRN:            [id number]            YOB: 1965  Accession #:    [id number]           Age:           58 years  Account#:       [id number]          Gender:        M  Visit ID#  Heart Rate:                         Height:        75.98 in (193.00 cm)  Rhythm:                            Weight:        328.48 lb (149.00 kg)  Blood Pressure: 117/71 mmHg         BSA/BMI:       2.74 m² / 40.00 kg/m²  ________________________________________________________________________________________  Referring Physician:   [id number] Kristian Castro  Interpreting Physician: Mrako Mauricio  Primary Sonographer:    Ferdinand Pereira    CPT:               ECHO TTE WO CON COMP W DOPP - 24484.m  Indication(s):     Abnormal electrocardiogram ECG/EKG - R94.31  Procedure:         Transthoracic echocardiogram with 2-D, M-mode and complete                     spectral and color flow Doppler.  Ordering Location: [facility]  Admission Status:  ED  Study Information: Image quality for this study is technically difficult.    _______________________________________________________________________________________     CONCLUSIONS:      1. Technically difficult image quality.   2. Left ventricular systolic function is normal with an ejection fraction of 58 % by Garay's method of disks.   3. The right ventricle is not well visualized. probably normal systolic function.   4. The left atrium is normal in size.   5. The right atrium is normal in size.   6. Tricuspid aortic valve with normal leaflet excursion. There is focal calcification of the aortic valve leaflets.   7. There is mild calcification of the mitral valve annulus.   8. Trace mitral regurgitation.   9. Trace tricuspid regurgitation.  10. The inferior vena cava is normal in size measuring 1.89 cm in diameter, (normal <2.1cm) with normal inspiratory collapse (normal >50%) consistent with normal right atrial pressure (~3, range 0-5mmHg).    ________________________________________________________________________________________  FINDINGS:     Left Ventricle:  Left ventricular systolic function is normal with a calculated ejection fraction of 58 % by the Garya's biplane method of disks. The left ventricular diastolic function is indeterminate.     Right Ventricle:  The right ventricle is not well visualized. Probably normal systolic function. Tricuspid annular plane systolic excursion (TAPSE) is 1.6 cm (normal >=1.7 cm).     Left Atrium:  The left atrium is normal in size with an indexed volume of 23.65 ml/m².     Right Atrium:  The right atrium is normal in size.     Aortic Valve:  The aortic valve is tricuspid with normal leaflet excursion. There is focal calcification of the aortic valve leaflets.     Mitral Valve:  There is mild calcification of the mitral valve annulus. There is trace mitral regurgitation.     Tricuspid Valve:  There is trace tricuspid regurgitation.     Pulmonic Valve:  The pulmonic valve was not well visualized. There is trace pulmonic regurgitation.     Aorta:  The aortic root at the sinuses of Valsalva is normal in size, measuring 3.20 cm (indexed 1.17 cm/m²). The aortic arch diameter is normal in size, measuring 2.8 cm (indexed 1.02 cm/m²).     Systemic Veins:  The inferior vena cava is normal in size measuring 1.89 cm in diameter, (normal <2.1cm) with normal inspiratory collapse (normal >50%) consistent with normal right atrial pressure (~3, range 0-5mmHg).  ____________________________________________________________________  QUANTITATIVE DATA:  Left Ventricle Measurements: (Indexed to BSA)     IVSd (2D):   1.2 cm  LVPWd (2D):  1.1 cm  LVIDd (2D):  4.9 cm  LVIDs (2D):  3.1 cm  LV Mass:     222 g  81.0 g/m²  LV Vol d, MOD A2C: 68.5 ml 25.04 ml/m²  LV Vol d, MOD A4C: 78.7 ml 28.77 ml/m²  LV Vol d, MOD BP:  75.3 ml 27.52 ml/m²  LV Vol s, MOD A2C: 23.4 ml 8.55 ml/m²  LV Vol s, MOD A4C: 34.1 ml 12.47 ml/m²  LV Vol s, MOD BP:  31.5 ml 11.50 ml/m²  LVOT SV MOD BP:    43.8 ml  LV EF% MOD BP:     58 %     MV E Vmax:    1.18 m/s  MV A Vmax:    0.35 m/s  MV E/A:       3.41  e' lateral:   10.90 cm/s  e' medial:    11.00cm/s  E/e' lateral: 10.83  E/e' medial:  10.73  E/e' Average: 10.78  MV DT:        219 msec    Aorta Measurements: (Normal range) (Indexed to BSA)     Sinuses of Valsalva: 3.20 cm (3.1 - 3.7 cm)  Ao Arch:             2.8 cm       Left Atrium Measurements: (Indexed to BSA)  LA Diam 2D: 4.10 cm    Right Ventricle Measurements:     TAPSE:            1.6 cm  RV Base (RVID1):  3.8 cm  RV Mid (RVID2):   3.3 cm  RV Major (RVID3): 6.7 cm       LVOT / RVOT/ Qp/Qs Data: (Indexed to BSA)  LVOT Diameter: 2.20 cm  LVOT Area:     3.80 cm²    Mitral Valve Measurements:     MV E Vmax: 1.2 m/s  MV A Vmax: 0.3 m/s  MV E/A:    3.4       Tricuspid Valve Measurements:     RA Pressure: 3 mmHg    ________________________________________________________________________________________  Electronically signed on 5/20/2024 at 5:52:45 PM by Marko Mauricio         *** Final ***

## 2024-05-22 NOTE — PROVIDER CONTACT NOTE (OTHER) - BACKGROUND
Pt s/p right foot procedure 5/22
a stat ptt was also drawn at 1230 and resulted   a stat ptt for 1450 was also auto generated from heparin order pre surgery

## 2024-05-22 NOTE — PROGRESS NOTE ADULT - PROBLEM SELECTOR PLAN 1
Admitted for non healing right  dm  foot wound. On home Bactrim for prophylaxis. Follows outpatient with Pod Dr. Catherine and Dr. Cambpell   - Right Foot Xray:  Status post resection of the fifth ray at the level of the distal metatarsal and proximal phalanx. Status post resection of the second and third digits. No cortical erosion or periosteal reaction. There is a plantar calcaneal enthesophyte. Strength is present within the medial navicular.  - On IV Zosyn now.   - Continue home Bactrim  - Continue local wound care with silver alginate and sterile dry dressing to the right foot  - Plan for OR with podiatry this AM   - S/p right foot exostectomy 5/21/24 with Dr. Catherine, f/u pathology   - F/u blood and wound cultures   - Podiatry following   - ID following   - Vascular following   -Wound care/ dressing change per Podiatry

## 2024-05-22 NOTE — PROGRESS NOTE ADULT - SUBJECTIVE AND OBJECTIVE BOX
Patient is a 58y old  Male who presents with a chief complaint of Right foot wound (21 May 2024 13:40)      INTERVAL HPI/OVERNIGHT EVENTS: Patient seen and examined at bedside. Denies any new symptoms/complaints. No chest pain, palpitations, sob, n/v/d/c    MEDICATIONS  (STANDING):  aspirin  chewable 81 milliGRAM(s) Oral daily  atorvastatin 10 milliGRAM(s) Oral at bedtime  azaTHIOprine 100 milliGRAM(s) Oral daily  carvedilol 12.5 milliGRAM(s) Oral every 12 hours  cloNIDine 0.2 milliGRAM(s) Oral three times a day  dextrose 10% Bolus 125 milliLiter(s) IV Bolus once  dextrose 5%. 1000 milliLiter(s) (50 mL/Hr) IV Continuous <Continuous>  dextrose 5%. 1000 milliLiter(s) (100 mL/Hr) IV Continuous <Continuous>  dextrose 50% Injectable 25 Gram(s) IV Push once  dextrose 50% Injectable 12.5 Gram(s) IV Push once  gabapentin 300 milliGRAM(s) Oral two times a day  glucagon  Injectable 1 milliGRAM(s) IntraMuscular once  heparin  Infusion.  Unit(s)/Hr (24 mL/Hr) IV Continuous <Continuous>  hydrALAZINE 100 milliGRAM(s) Oral three times a day  insulin lispro (HumaLOG) Pump 1 Each SubCutaneous Continuous Pump  losartan 25 milliGRAM(s) Oral at bedtime  NIFEdipine XL 30 milliGRAM(s) Oral two times a day  piperacillin/tazobactam IVPB.. 3.375 Gram(s) IV Intermittent every 8 hours  sodium chloride 0.9%. 1000 milliLiter(s) (100 mL/Hr) IV Continuous <Continuous>  tacrolimus 1.5 milliGRAM(s) Oral <User Schedule>  trimethoprim   80 mG/sulfamethoxazole 400 mG 1 Tablet(s) Oral daily    MEDICATIONS  (PRN):  acetaminophen     Tablet .. 650 milliGRAM(s) Oral every 6 hours PRN Temp greater or equal to 38C (100.4F), Mild Pain (1 - 3)  aluminum hydroxide/magnesium hydroxide/simethicone Suspension 30 milliLiter(s) Oral every 4 hours PRN Dyspepsia  dextrose Oral Gel 15 Gram(s) Oral once PRN Blood Glucose LESS THAN 70 milliGRAM(s)/deciliter  heparin   Injectable 57264 Unit(s) IV Push every 6 hours PRN For aPTT less than 40  heparin   Injectable 5000 Unit(s) IV Push every 6 hours PRN For aPTT between 40 - 57  melatonin 3 milliGRAM(s) Oral at bedtime PRN Insomnia  ondansetron Injectable 4 milliGRAM(s) IV Push every 8 hours PRN Nausea and/or Vomiting      Allergies    No Known Allergies    Intolerances        REVIEW OF SYSTEMS:  Per HPI. All other ROS Noted Negative   Vital Signs Last 24 Hrs  T(C): 36.6 (22 May 2024 04:45), Max: 37 (21 May 2024 20:25)  T(F): 97.8 (22 May 2024 04:45), Max: 98.6 (21 May 2024 20:25)  HR: 74 (22 May 2024 04:45) (72 - 89)  BP: 134/70 (22 May 2024 04:45) (125/52 - 159/74)  BP(mean): --  RR: 17 (22 May 2024 04:45) (12 - 18)  SpO2: 96% (22 May 2024 04:45) (93% - 96%)    Parameters below as of 22 May 2024 04:45  Patient On (Oxygen Delivery Method): BiPAP/CPAP        PHYSICAL EXAM:  GENERAL: NAD  HEENT:  anicteric, moist mucous membranes  CHEST/LUNG:  CTA b/l, no rales, wheezes, or rhonchi  HEART:  S1, S2 , irregular/ no tachy    ABDOMEN:  BS+, soft, nontender, nondistended  EXTREMITIES: + right foot dressed, clean dry intact  NERVOUS SYSTEM: answers questions and follows commands appropriately    LABS:                        12.9   8.44  )-----------( 181      ( 22 May 2024 08:35 )             39.1       Ca    9.0        21 May 2024 08:00      PT/INR - ( 20 May 2024 10:10 )   PT: 10.8 sec;   INR: 0.92 ratio         PTT - ( 22 May 2024 00:54 )  PTT:73.6 sec  CAPILLARY BLOOD GLUCOSE      POCT Blood Glucose.: 105 mg/dL (22 May 2024 08:24)  POCT Blood Glucose.: 193 mg/dL (21 May 2024 21:21)  POCT Blood Glucose.: 172 mg/dL (21 May 2024 17:17)  POCT Blood Glucose.: 120 mg/dL (21 May 2024 12:00)  POCT Blood Glucose.: 99 mg/dL (21 May 2024 10:23)    BLOOD CULTURE  05-20 @ 10:10   No growth at 24 hours  --  --  05-20 @ 10:05   No growth at 24 hours  --  --    RADIOLOGY & ADDITIONAL TESTS:    Imaging Personally Reviewed:  [ ] YES     Consultant(s) Notes Reviewed:      Care Discussed with Consultants/Other Providers:

## 2024-05-22 NOTE — PROGRESS NOTE ADULT - PROBLEM SELECTOR PLAN 2
EKG on admission showed new onset Afib, rate controlled. No known cardiac conditions in the past.   - Cxray: No acute finding or change.  - EKbpm, QTc 418, Afib  - TTE (): EF 65-70%, no significant valvular pathology   - Stress Test  no signs of ischemia  - EKG complete - Afib rate 64  - TTE reviewed - EF 58%  - Continue home Carvedilol 12.5mg BID  - Continue heparin gtt. Will switch back to Eliquis once cleared by Podiatry   - Cardio consulted  dr davies

## 2024-05-22 NOTE — PHYSICAL THERAPY INITIAL EVALUATION ADULT - DIAGNOSIS, PT EVAL
SAMIRA NUGENT    [id number]    50y      Male    CC: recurrent pleural effusion     INTERVAL HPI/OVERNIGHT EVENTS: no acute events    REVIEW OF SYSTEMS:      pt non-verbal - does not provide any ROS    Vital Signs Last 24 Hrs  T(C): 36.4, Max: 36.9 (06-25 @ 18:22)  T(F): 97.6, Max: 98.4 (06-25 @ 18:22)  HR: 98 (86 - 98)  BP: 118/75 (118/75 - 122/76)  BP(mean): --  RR: 18 (18 - 20)  SpO2: 97% (95% - 97%)    PHYSICAL EXAM:    GENERAL: NAD, appears comfortable, loss of subcut fat  HEENT: PERRL, +EOMI  CHEST/LUNG: AE decreased on RT, No wheezing  HEART: S1S2+, Regular rate and rhythm; No murmurs, rubs, or gallops  ABDOMEN: Soft, Nontender, Nondistended  EXTREMITIES:   No clubbing, cyanosis, or edema  NEURO: AAOX3, no focal deficits      I & Os for current day (as of 06-26 @ 16:21)  =============================================  IN: 0 ml / OUT: 200 ml / NET: -200 ml      LABS:                        9.4    16.6  )-----------( 517      ( 26 Jun 2017 07:34 )             29.7     06-26    141  |  103  |  20.0  ----------------------------<  89  3.8   |  23.0  |  0.99    Ca    9.0      26 Jun 2017 07:34    TPro  6.9  /  Alb  2.9<L>  /  TBili  0.1<L>  /  DBili  x   /  AST  22  /  ALT  32  /  AlkPhos  73  06-26            MEDICATIONS  (STANDING):  piperacillin/tazobactam IVPB. 3.375Gram(s) IV Intermittent every 8 hours  levothyroxine 150MICROGram(s) Oral daily  pantoprazole    Tablet 40milliGRAM(s) Oral before breakfast  saccharomyces boulardii 250milliGRAM(s) Oral two times a day  haloperidol     Tablet 5milliGRAM(s) Oral two times a day  simethicone drops 80milliGRAM(s) Oral every 8 hours  diVALproex DR 500milliGRAM(s) Oral daily  diVALproex DR 750milliGRAM(s) Oral at bedtime  enoxaparin Injectable 40milliGRAM(s) SubCutaneous daily    MEDICATIONS  (PRN):  acetaminophen   Tablet 650milliGRAM(s) Oral every 6 hours PRN For Temp greater than 38.5 C (101.3 F)  artificial  tears Solution 1Drop(s) Both EYES every 8 hours PRN Dry Eyes  haloperidol    Injectable 2milliGRAM(s) IntraMuscular every 8 hours PRN Agitation      RADIOLOGY & ADDITIONAL TESTS:    CT chest - discussed with Dr Portillo - THoracic Sx gait instability

## 2024-05-22 NOTE — PROGRESS NOTE ADULT - PROBLEM SELECTOR PLAN 1
Patient seen and evaluated  Discussed condition with patient  Patient is s/p right foot exostectomy  Surgical pathology report pending  Rec cont abx per ID recs  NWB right lower extremity  Rec cont elevate right lower extremity  PT consulted  Will cont to follow while in house

## 2024-05-22 NOTE — PROGRESS NOTE ADULT - PROBLEM SELECTOR PLAN 5
Chronic   - /74 on admission  - Continue home Carvedilol 12.5mg BID   - Continue home Clonidine 0.2mg TID  - Continue home Nifedipine 30mg BID  - Continue home Hydralazine 100mg TID  - Continue home Losartan 25mg at bedtime   - Cardio consulted dr saleh

## 2024-05-22 NOTE — PROGRESS NOTE ADULT - PROBLEM SELECTOR PLAN 3
Type 1 Diabetes w/ Insulin Pump and CGM. Insulin pump is replenished every 3 days. Follows Ramo Russell.   - Insulin pump last changed Sunday 5/19 --> needs to be replenished Tuesday night 5/21  - Insulin pump gives continuous 3u Insulin every hour  - Gets additional 5-10u insulin premeal   - Monitor fingersticks   - Consistent carb diet  - Hypoglycemia protocol   -  A1c 6.6  - Continue home Gabapentin 300mg BID for diabetic neuropathy   - Ramo consulted/Pat Weil Consulted also,

## 2024-05-22 NOTE — PROGRESS NOTE ADULT - PROBLEM SELECTOR PLAN 8
DVT Prophylaxis: Heparin gtt for  NEW A fib    - Will need initiation of AC given elevated CHADSVASc.- 5

## 2024-05-22 NOTE — PHYSICAL THERAPY INITIAL EVALUATION ADULT - ADDITIONAL COMMENTS
Pt lives in a split level house with his spouse and kids, + steps. Pt stating once he gets to the 2nd floor he will stay there. Pt was an ind ambulator without device and ind w/ ADLs. Pt has RW at home.

## 2024-05-23 LAB
ANION GAP SERPL CALC-SCNC: 4 MMOL/L — LOW (ref 5–17)
APTT BLD: 32.6 SEC — SIGNIFICANT CHANGE UP (ref 24.5–35.6)
BUN SERPL-MCNC: 25 MG/DL — HIGH (ref 7–23)
CALCIUM SERPL-MCNC: 9.1 MG/DL — SIGNIFICANT CHANGE UP (ref 8.5–10.1)
CHLORIDE SERPL-SCNC: 101 MMOL/L — SIGNIFICANT CHANGE UP (ref 96–108)
CO2 SERPL-SCNC: 29 MMOL/L — SIGNIFICANT CHANGE UP (ref 22–31)
CREAT SERPL-MCNC: 1.7 MG/DL — HIGH (ref 0.5–1.3)
CULTURE RESULTS: SIGNIFICANT CHANGE UP
EGFR: 46 ML/MIN/1.73M2 — LOW
GLUCOSE SERPL-MCNC: 165 MG/DL — HIGH (ref 70–99)
HCT VFR BLD CALC: 36.2 % — LOW (ref 39–50)
HGB BLD-MCNC: 12 G/DL — LOW (ref 13–17)
MCHC RBC-ENTMCNC: 29.9 PG — SIGNIFICANT CHANGE UP (ref 27–34)
MCHC RBC-ENTMCNC: 33.1 GM/DL — SIGNIFICANT CHANGE UP (ref 32–36)
MCV RBC AUTO: 90.3 FL — SIGNIFICANT CHANGE UP (ref 80–100)
NRBC # BLD: 0 /100 WBCS — SIGNIFICANT CHANGE UP (ref 0–0)
PLATELET # BLD AUTO: 190 K/UL — SIGNIFICANT CHANGE UP (ref 150–400)
POTASSIUM SERPL-MCNC: 3.6 MMOL/L — SIGNIFICANT CHANGE UP (ref 3.5–5.3)
POTASSIUM SERPL-SCNC: 3.6 MMOL/L — SIGNIFICANT CHANGE UP (ref 3.5–5.3)
RBC # BLD: 4.01 M/UL — LOW (ref 4.2–5.8)
RBC # FLD: 13.9 % — SIGNIFICANT CHANGE UP (ref 10.3–14.5)
SODIUM SERPL-SCNC: 134 MMOL/L — LOW (ref 135–145)
SPECIMEN SOURCE: SIGNIFICANT CHANGE UP
WBC # BLD: 8.18 K/UL — SIGNIFICANT CHANGE UP (ref 3.8–10.5)
WBC # FLD AUTO: 8.18 K/UL — SIGNIFICANT CHANGE UP (ref 3.8–10.5)

## 2024-05-23 PROCEDURE — 99232 SBSQ HOSP IP/OBS MODERATE 35: CPT

## 2024-05-23 PROCEDURE — 99233 SBSQ HOSP IP/OBS HIGH 50: CPT

## 2024-05-23 RX ORDER — HYDRALAZINE HCL 50 MG
100 TABLET ORAL THREE TIMES A DAY
Refills: 0 | Status: DISCONTINUED | OUTPATIENT
Start: 2024-05-23 | End: 2024-05-25

## 2024-05-23 RX ORDER — SODIUM CHLORIDE 9 MG/ML
1000 INJECTION INTRAMUSCULAR; INTRAVENOUS; SUBCUTANEOUS
Refills: 0 | Status: DISCONTINUED | OUTPATIENT
Start: 2024-05-23 | End: 2024-05-25

## 2024-05-23 RX ADMIN — Medication 30 MILLIGRAM(S): at 06:02

## 2024-05-23 RX ADMIN — CARVEDILOL PHOSPHATE 12.5 MILLIGRAM(S): 80 CAPSULE, EXTENDED RELEASE ORAL at 06:01

## 2024-05-23 RX ADMIN — Medication 30 MILLIGRAM(S): at 17:25

## 2024-05-23 RX ADMIN — PIPERACILLIN AND TAZOBACTAM 25 GRAM(S): 4; .5 INJECTION, POWDER, LYOPHILIZED, FOR SOLUTION INTRAVENOUS at 22:10

## 2024-05-23 RX ADMIN — TACROLIMUS 1.5 MILLIGRAM(S): 5 CAPSULE ORAL at 21:30

## 2024-05-23 RX ADMIN — Medication 100 MILLIGRAM(S): at 06:01

## 2024-05-23 RX ADMIN — PIPERACILLIN AND TAZOBACTAM 25 GRAM(S): 4; .5 INJECTION, POWDER, LYOPHILIZED, FOR SOLUTION INTRAVENOUS at 06:02

## 2024-05-23 RX ADMIN — AZATHIOPRINE 100 MILLIGRAM(S): 100 TABLET ORAL at 12:14

## 2024-05-23 RX ADMIN — TACROLIMUS 1.5 MILLIGRAM(S): 5 CAPSULE ORAL at 09:26

## 2024-05-23 RX ADMIN — Medication 100 MILLIGRAM(S): at 22:10

## 2024-05-23 RX ADMIN — GABAPENTIN 300 MILLIGRAM(S): 400 CAPSULE ORAL at 06:02

## 2024-05-23 RX ADMIN — PIPERACILLIN AND TAZOBACTAM 25 GRAM(S): 4; .5 INJECTION, POWDER, LYOPHILIZED, FOR SOLUTION INTRAVENOUS at 13:22

## 2024-05-23 RX ADMIN — CARVEDILOL PHOSPHATE 12.5 MILLIGRAM(S): 80 CAPSULE, EXTENDED RELEASE ORAL at 17:24

## 2024-05-23 RX ADMIN — ATORVASTATIN CALCIUM 10 MILLIGRAM(S): 80 TABLET, FILM COATED ORAL at 22:11

## 2024-05-23 RX ADMIN — Medication 1 TABLET(S): at 12:13

## 2024-05-23 RX ADMIN — GABAPENTIN 300 MILLIGRAM(S): 400 CAPSULE ORAL at 17:24

## 2024-05-23 RX ADMIN — Medication 81 MILLIGRAM(S): at 12:18

## 2024-05-23 RX ADMIN — APIXABAN 5 MILLIGRAM(S): 2.5 TABLET, FILM COATED ORAL at 11:00

## 2024-05-23 RX ADMIN — APIXABAN 5 MILLIGRAM(S): 2.5 TABLET, FILM COATED ORAL at 22:11

## 2024-05-23 RX ADMIN — Medication 0.2 MILLIGRAM(S): at 06:01

## 2024-05-23 RX ADMIN — Medication 0.2 MILLIGRAM(S): at 22:10

## 2024-05-23 NOTE — PROGRESS NOTE ADULT - PROBLEM SELECTOR PLAN 1
- On IV Zosyn now.   - Continue home Bactrim  - Continue local wound care with silver alginate and sterile dry dressing to the right foot  - S/p right foot exostectomy 5/21/24 with Dr. Catherine, f/u pathology   - Blood cultures NGTD   - Podiatry following   - ID following   - Vascular following   -Wound care/ dressing change per Podiatry

## 2024-05-23 NOTE — PROGRESS NOTE ADULT - SUBJECTIVE AND OBJECTIVE BOX
PROGRESS NOTE   Patient is a 58y old  Male who presents with a chief complaint of Right foot wound (23 May 2024 12:45)  s/p right foot exostectomy Patient relates no overnight events and states that they are doing well at this time.  Denies any fever, chills, nausea, vomiting, chest pain, shortness of breath, or calf pain at this time.        HPI:  Patient is a 57yo M with a PMH of type 1 diabetes mellitus, HTN, HLD, CVA (), prior DVT (s/p Eliquis), recurrent right pleural effusion with VATS, renal transplant (), sent in from wound care for a non healing right foot wound. Patient has been following with outpatient wound care for the non healing wound on his right foot, and has noted that it has progressively gotten worse. He was sent in for a right  foot exostectomy for tomorrow 24 with Dr. Catherine. Patient notes that there has been a small amount of bloody drainage from the wound, but denies any pain, pus or foul smell. He is able to bear weight on his right foot and does not have any problems walking. He has peripheral neuropathy so he has decreased sensation in his b/l LE, but no tingling. Denies fever, chills, chest pain, palpitations, SOB, cough, abdominal pain, n/v/d/c, dysuria, hematuria, changes in vision, dizziness,       ED course:  Vitals: /74, HR 84, T 98.2, RR 16 SpO2 100% on RA   Labs: H/H 12.6/38.8, BUN/Cr 25/1.2  Given: IV Zosyn x1, IV Vancomycin 1g x1, 1L NaCl bolus x1   EKbpm, QTc 418, Afib      Imaging  Cxray: No acute finding or change.  Right Foot Xray:  Status post resection of the fifth ray at the level of the distal metatarsal and proximal phalanx. Status post resection of the second and third digits. No cortical erosion or periosteal reaction. There is a plantar calcaneal enthesophyte. Strength is present within the medial navicular.   (20 May 2024 12:52)      Vital Signs Last 24 Hrs  T(C): 37.2 (23 May 2024 11:21), Max: 37.2 (23 May 2024 11:21)  T(F): 99 (23 May 2024 11:21), Max: 99 (23 May 2024 11:21)  HR: 69 (23 May 2024 11:21) (69 - 76)  BP: 105/- (23 May 2024 13:15) (105/- - 135/57)  BP(mean): --  RR: 18 (23 May 2024 11:21) (18 - 18)  SpO2: 96% (23 May 2024 11:21) (94% - 96%)    Parameters below as of 23 May 2024 11:21  Patient On (Oxygen Delivery Method): room air                              12.0   8.18  )-----------( 190      ( 23 May 2024 07:58 )             36.2               05-23    134<L>  |  101  |  25<H>  ----------------------------<  165<H>  3.6   |  29  |  1.70<H>    Ca    9.1      23 May 2024 07:58  Phos  3.6     05-22  Mg     2.0     05-22        PHYSICAL EXAM  Vascular: DP & PT palpable bilaterally, Capillary refill 3 seconds, Digital hair absent bilaterally  Neurological: Light touch sensation diminished b/l   Musculoskeletal: 5/5 strength in all quadrants bilaterally, AJ & STJ ROM intact  Dermatological: s/p right foot exostectomy.  Incision site is clean, sutures intact no dehiscence   2.0cm x 1.0cm x down to bone with tracking noted to the wound ,(+)  probe to bone, mild  periwound erythema, no fluctuance, no malodor, no proximal streaking at this time

## 2024-05-23 NOTE — PROGRESS NOTE ADULT - PROBLEM SELECTOR PLAN 1
Patient seen and evaluated  Discussed condition with patient  Patient is s/p right foot exostectomy  Dressing changed at this time  Surgical pathology report pending  Rec cont abx per ID recs  NWB right lower extremity  Rec cont elevate right lower extremity  Will cont to follow while in house.  Wound Care Instructions:  -Keep dressing clean, dry, and intact to the right foot  -Patient weightbearing as tolerated in surgical shoe   -Monitor for any signs of infection including redness, swelling in the leg above the bandage, nausea/vomiting/fever/chills/chest pain/shortness of breath, if any are present patient must report to the emergency department immediately  -Patient is to follow up with Dr. Araiza/Dr. Catherine/ Dr. Campbell  within 5 days after discharge at [facility]. Please call to make an appointment [phone]

## 2024-05-23 NOTE — PROGRESS NOTE ADULT - SUBJECTIVE AND OBJECTIVE BOX
Renal  consult called with MAYRA, h/o Kidney transplant. Will hold ARB. Check tacrolimus level.   IV hydration. Full consult to follow.

## 2024-05-23 NOTE — CONSULT NOTE ADULT - PROBLEM SELECTOR PROBLEM 1
Type 1 diabetes mellitus
Diabetic ulcer of right foot with necrosis of muscle
Type 1 diabetes mellitus

## 2024-05-23 NOTE — CONSULT NOTE ADULT - SUBJECTIVE AND OBJECTIVE BOX
Patient is a 58y old  Male who presents with a chief complaint of Right foot wound (23 May 2024 10:54)      Reason For Consult: dm1 uncontrolled    HPI:  Patient is a 59yo M with a PMH of type 1 diabetes mellitus, HTN, HLD, CVA (), prior DVT (s/p Eliquis), recurrent right pleural effusion with VATS, renal transplant (), sent in from wound care for a non healing right foot wound. Patient has been following with outpatient wound care for the non healing wound on his right foot, and has noted that it has progressively gotten worse. He was sent in for a right  foot exostectomy for tomorrow 24 with Dr. Catherine. Patient notes that there has been a small amount of bloody drainage from the wound, but denies any pain, pus or foul smell. He is able to bear weight on his right foot and does not have any problems walking. He has peripheral neuropathy so he has decreased sensation in his b/l LE, but no tingling. Denies fever, chills, chest pain, palpitations, SOB, cough, abdominal pain, n/v/d/c, dysuria, hematuria, changes in vision, dizziness,       ED course:  Vitals: /74, HR 84, T 98.2, RR 16 SpO2 100% on RA   Labs: H/H 12.6/38.8, BUN/Cr 25/1.2  Given: IV Zosyn x1, IV Vancomycin 1g x1, 1L NaCl bolus x1   EKbpm, QTc 418, Afib      Imaging  Cxray: No acute finding or change.  Right Foot Xray:  Status post resection of the fifth ray at the level of the distal metatarsal and proximal phalanx. Status post resection of the second and third digits. No cortical erosion or periosteal reaction. There is a plantar calcaneal enthesophyte. Strength is present within the medial navicular.   (20 May 2024 12:52)      PAST MEDICAL & SURGICAL HISTORY:  Hypertension      Hyperlipidemia      Diabetes mellitus  Type 1 on insulin pump      CKD (chronic kidney disease)  Renal Transplant  at [facility]      Diabetic peripheral neuropathy      Obesity (BMI 30-39.9)      CVA (cerebral vascular accident)  Wallenberg Stroke  low L body sensation  low R face sensation  decreased peripheral vision  poor balance          Squamous cell carcinoma of skin of left ear  nose      History of DVT (deep vein thrombosis)  LLE , was on Eliquis x 6 months  Was hospitalized for Rhinovirus at the time, intubated      Recurrent squamous cell carcinoma of skin  nose, L arm      History of pleural effusion  right thoracentesis 2022 and 2022      CARMEN treated with BiPAP  2L O2 at night      History of restrictive lung disease      Toe infection   L 4th Toe surgery-amputation secondary to osteomyelitis  2019 partial right 2nd 4th toe amputation      Ear disease  Left inner ear surgery, 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, Can NOT have MRI      Vasectomy status  s/p b/l  vasectomy      H/O foot surgery   - right foot - bone fracture - s/p ORIF and subsequent removal of hardware      End-stage renal failure with renal transplant  2013      S/P kidney transplant        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 1000 milliLiter(s) (50 mL/Hr) IV Continuous <Continuous>  dextrose 5%. 1000 milliLiter(s) (100 mL/Hr) IV Continuous <Continuous>  dextrose 50% Injectable 12.5 Gram(s) IV Push once  dextrose 50% Injectable 25 Gram(s) IV Push once  gabapentin 300 milliGRAM(s) Oral two times a day  glucagon  Injectable 1 milliGRAM(s) IntraMuscular once  hydrALAZINE 100 milliGRAM(s) Oral three times a day  insulin lispro (HumaLOG) Pump 1 Each SubCutaneous Continuous Pump  losartan 25 milliGRAM(s) Oral at bedtime  NIFEdipine XL 30 milliGRAM(s) Oral two times a day  piperacillin/tazobactam IVPB.. 3.375 Gram(s) IV Intermittent every 8 hours  sodium chloride 0.9%. 1000 milliLiter(s) (100 mL/Hr) IV Continuous <Continuous>  tacrolimus 1.5 milliGRAM(s) Oral <User Schedule>  trimethoprim   80 mG/sulfamethoxazole 400 mG 1 Tablet(s) Oral daily    MEDICATIONS  (PRN):  acetaminophen     Tablet .. 650 milliGRAM(s) Oral every 6 hours PRN Temp greater or equal to 38C (100.4F), Mild Pain (1 - 3)  aluminum hydroxide/magnesium hydroxide/simethicone Suspension 30 milliLiter(s) Oral every 4 hours PRN Dyspepsia  dextrose Oral Gel 15 Gram(s) Oral once PRN Blood Glucose LESS THAN 70 milliGRAM(s)/deciliter  melatonin 3 milliGRAM(s) Oral at bedtime PRN Insomnia  ondansetron Injectable 4 milliGRAM(s) IV Push every 8 hours PRN Nausea and/or Vomiting        T(C): 36.6 (24 @ 04:55), Max: 38 (24 @ 11:26)  HR: 73 (24 @ 04:55) (72 - 76)  BP: 135/57 (24 @ 04:55) (122/70 - 135/57)  RR: 18 (24 @ 04:55) (17 - 18)  SpO2: 95% (24 @ 04:55) (92% - 95%)  Wt(kg): --    PHYSICAL EXAM:  CHEST/LUNG: Clear to percussion bilaterally; No rales, rhonchi, wheezing, or rubs  HEART: Regular rate and rhythm; No murmurs, rubs, or gallops  ABDOMEN: Soft, Nontender, Nondistended; Bowel sounds present  EXTREMITIES:  2+ Peripheral Pulses, No clubbing, cyanosis, or edema  SKIN: No rashes or lesions    CAPILLARY BLOOD GLUCOSE      POCT Blood Glucose.: 172 mg/dL (23 May 2024 08:05)  POCT Blood Glucose.: 203 mg/dL (22 May 2024 21:26)  POCT Blood Glucose.: 115 mg/dL (22 May 2024 16:55)  POCT Blood Glucose.: 119 mg/dL (22 May 2024 12:12)                            12.0   8.18  )-----------( 190      ( 23 May 2024 07:58 )             36.2       CMP:  - @ 07:58  SGPT --  Albumin --   Alk Phos --   Anion Gap 4   SGOT --   Total Bili --   BUN 25   Calcium Total 9.1   CO2 29   Chloride 101   Creatinine 1.70   eGFR if AA --   eGFR if non AA --   Glucose 165   Potassium 3.6   Protein --   Sodium 134      Thyroid Function Tests:      Diabetes Tests:       Radiology:

## 2024-05-23 NOTE — PHARMACOTHERAPY INTERVENTION NOTE - COMMENTS
Diabetes Medication on Admission: Humalog pump    Diabetes medications and hospital hypoglycemic protocol was counseled by Pharmacist at bedside on indications, directions, and side effects.    Patient verbalized understanding and had no further questions.
Admission counseling - discussed current and new medications with the patient at bedside including indications, directions, and adverse effects to monitor. Patient verbalized understanding and had no further questions.

## 2024-05-23 NOTE — PROGRESS NOTE ADULT - ASSESSMENT
59yo M with a PMH of type 1 diabetes mellitus, HTN, HLD, CVA (2015), prior DVT (s/p Eliquis ~ 9 years ago), recurrent right pleural effusion with VATS, renal transplant (2013), sent in from wound care for a non healing right foot wound.     Cardiac optimization, New onset Afib   - presented from wound care center for non healing right foot wound, s/p right foot exostectomy with Dr. Catherine (5/21/24) tolerated well from CV POV, pending path/cx   - on abx per ID following   - EKG : Afib 60 bpm, no acute ischemia noted, new from prior read  - no anginal complaints  - continue ASA and statin     - New onset Afib, unknown duration  - CHADVasc: 5   - s/p heparin gtt; switched to Eliquis 5/22; tolerating well. Continue Eliquis 5mg BID  - telemetry: Afib 70's no events x 48 hours, can dc tele in AM if no events overnight   - BP controlled (hx of longstanding HTN)  - continue home anti hypertensives as tolerated by blood pressure: Coreg, Clonidine, hydralazine, losartan, nifedipine   - Monitor and replete Lytes. Keep K > 4 and Mg > 2    - CXR: neg for acute process  - TTE: (2022) normal EF 65 %  - TTE (5/2024) normaL EF 58 %   - has chronic b/l LE edema, otherwise no sign of volume overload on exam    - follows with Dr. Kelly (o/p cardiologist)   - Will continue to follow.    Blanca Seo PA-C  Cardiology  Available on Teams

## 2024-05-23 NOTE — PROGRESS NOTE ADULT - SUBJECTIVE AND OBJECTIVE BOX
Patient is a 58y old  Male who presents with a chief complaint of Right foot wound (22 May 2024 11:24)       INTERVAL HPI/OVERNIGHT EVENTS: Patient seen and examined at bedside. Denies chest pain, sob, n/v/d. Feeling better. No foot pain.     MEDICATIONS  (STANDING):  apixaban 5 milliGRAM(s) Oral two times a day  aspirin  chewable 81 milliGRAM(s) Oral daily  atorvastatin 10 milliGRAM(s) Oral at bedtime  azaTHIOprine 100 milliGRAM(s) Oral daily  carvedilol 12.5 milliGRAM(s) Oral every 12 hours  cloNIDine 0.2 milliGRAM(s) Oral three times a day  dextrose 10% Bolus 125 milliLiter(s) IV Bolus once  dextrose 5%. 1000 milliLiter(s) (50 mL/Hr) IV Continuous <Continuous>  dextrose 5%. 1000 milliLiter(s) (100 mL/Hr) IV Continuous <Continuous>  dextrose 50% Injectable 12.5 Gram(s) IV Push once  dextrose 50% Injectable 25 Gram(s) IV Push once  gabapentin 300 milliGRAM(s) Oral two times a day  glucagon  Injectable 1 milliGRAM(s) IntraMuscular once  hydrALAZINE 100 milliGRAM(s) Oral three times a day  insulin lispro (HumaLOG) Pump 1 Each SubCutaneous Continuous Pump  losartan 25 milliGRAM(s) Oral at bedtime  NIFEdipine XL 30 milliGRAM(s) Oral two times a day  piperacillin/tazobactam IVPB.. 3.375 Gram(s) IV Intermittent every 8 hours  sodium chloride 0.9%. 1000 milliLiter(s) (100 mL/Hr) IV Continuous <Continuous>  tacrolimus 1.5 milliGRAM(s) Oral <User Schedule>  trimethoprim   80 mG/sulfamethoxazole 400 mG 1 Tablet(s) Oral daily    MEDICATIONS  (PRN):  acetaminophen     Tablet .. 650 milliGRAM(s) Oral every 6 hours PRN Temp greater or equal to 38C (100.4F), Mild Pain (1 - 3)  aluminum hydroxide/magnesium hydroxide/simethicone Suspension 30 milliLiter(s) Oral every 4 hours PRN Dyspepsia  dextrose Oral Gel 15 Gram(s) Oral once PRN Blood Glucose LESS THAN 70 milliGRAM(s)/deciliter  melatonin 3 milliGRAM(s) Oral at bedtime PRN Insomnia  ondansetron Injectable 4 milliGRAM(s) IV Push every 8 hours PRN Nausea and/or Vomiting      Allergies    No Known Allergies    Intolerances        REVIEW OF SYSTEMS:  Per HPI. All other ROS noted Negative   Vital Signs Last 24 Hrs  T(C): 36.6 (23 May 2024 04:55), Max: 38 (22 May 2024 11:26)  T(F): 97.9 (23 May 2024 04:55), Max: 100.4 (22 May 2024 11:26)  HR: 73 (23 May 2024 04:55) (72 - 76)  BP: 135/57 (23 May 2024 04:55) (122/70 - 135/57)  BP(mean): --  RR: 18 (23 May 2024 04:55) (17 - 18)  SpO2: 95% (23 May 2024 04:55) (92% - 95%)    Parameters below as of 23 May 2024 04:55  Patient On (Oxygen Delivery Method): room air        PHYSICAL EXAM:  HEENT:  anicteric, moist mucous membranes  CHEST/LUNG:  CTA b/l, no rales, wheezes, or rhonchi  HEART:  S1, S2 , irregular/ no tachy    ABDOMEN:  BS+, soft, nontender, nondistended  EXTREMITIES: + right foot dressed, clean dry   NERVOUS SYSTEM: answers questions and follows commands appropriately  Skin: warm, dry, no rash       LABS:                        12.0   8.18  )-----------( 190      ( 23 May 2024 07:58 )             36.2       Ca    8.8        22 May 2024 08:35      PTT - ( 23 May 2024 07:58 )  PTT:32.6 sec  CAPILLARY BLOOD GLUCOSE      POCT Blood Glucose.: 172 mg/dL (23 May 2024 08:05)  POCT Blood Glucose.: 203 mg/dL (22 May 2024 21:26)  POCT Blood Glucose.: 115 mg/dL (22 May 2024 16:55)  POCT Blood Glucose.: 119 mg/dL (22 May 2024 12:12)    BLOOD CULTURE  05-21 @ 11:30   No growth  --  --  05-20 @ 20:00   Normal skin fady isolated  --  --  05-20 @ 10:10   No growth at 48 Hours  --  --  05-20 @ 10:05   No growth at 48 Hours  --  --    RADIOLOGY & ADDITIONAL TESTS:    Imaging Personally Reviewed:  [ ] YES     Consultant(s) Notes Reviewed:      Care Discussed with Consultants/Other Providers:

## 2024-05-23 NOTE — PROGRESS NOTE ADULT - ASSESSMENT
Patient is a 57yo M with a PMH of type 1 diabetes mellitus, HTN, HLD, CVA (2015), prior DVT (s/p Eliquis), recurrent right pleural effusion with VATS, renal transplant (2013), sent in from wound care for a non healing right foot wound.     R foot wound  - Right Foot Xray:  Status post resection of the fifth ray at the level of the distal metatarsal and proximal phalanx. Status post resection of the second and third digits. No cortical erosion or periosteal reaction. There is a plantar calcaneal enthesophyte. Strength is present within the medial navicular.  - Prior WCx E. faecalis from 6/30/22  - BCx/WCx/OR cx NGTD  - S/p Exostectomy of right foot 21-May-2024 11:57:39  Donte Catherine    Hx of renal txp in 2013    Recommendations:  C/w zosyn for coverage  C/w bactrim for ppx in renal txp pt  F/u pending path  Trend temps/WBC  Appreciate podiatry recs  Additional care per primary team    Further recs to follow pending above    Infectious Diseases will follow. Please call with any questions.  Claire Castro M.D.  [facility] Division of Infectious Diseases [phone]  For after 5 P.M. and weekends, please call [phone]

## 2024-05-23 NOTE — PROGRESS NOTE ADULT - SUBJECTIVE AND OBJECTIVE BOX
[facility], Division of Infectious Diseases  MEL Mckoy Y. Patel, S. Shah, G. [facility]  [phone]    Name: LUTHER NORIEGA  Age: 58y  Gender: Male  MRN: [id number]    Interval History:  Patient seen and examined at bedside   No acute overnight events. Afebrile  No complaints  Notes reviewed    Antibiotics:  piperacillin/tazobactam IVPB.. 3.375 Gram(s) IV Intermittent every 8 hours  trimethoprim   80 mG/sulfamethoxazole 400 mG 1 Tablet(s) Oral daily      Medications:  acetaminophen     Tablet .. 650 milliGRAM(s) Oral every 6 hours PRN  aluminum hydroxide/magnesium hydroxide/simethicone Suspension 30 milliLiter(s) Oral every 4 hours PRN  apixaban 5 milliGRAM(s) Oral two times a day  aspirin  chewable 81 milliGRAM(s) Oral daily  atorvastatin 10 milliGRAM(s) Oral at bedtime  azaTHIOprine 100 milliGRAM(s) Oral daily  carvedilol 12.5 milliGRAM(s) Oral every 12 hours  cloNIDine 0.2 milliGRAM(s) Oral three times a day  dextrose 10% Bolus 125 milliLiter(s) IV Bolus once  dextrose 5%. 1000 milliLiter(s) IV Continuous <Continuous>  dextrose 5%. 1000 milliLiter(s) IV Continuous <Continuous>  dextrose 50% Injectable 12.5 Gram(s) IV Push once  dextrose 50% Injectable 25 Gram(s) IV Push once  dextrose Oral Gel 15 Gram(s) Oral once PRN  gabapentin 300 milliGRAM(s) Oral two times a day  glucagon  Injectable 1 milliGRAM(s) IntraMuscular once  hydrALAZINE 100 milliGRAM(s) Oral three times a day  insulin lispro (HumaLOG) Pump 1 Each SubCutaneous Continuous Pump  losartan 25 milliGRAM(s) Oral at bedtime  melatonin 3 milliGRAM(s) Oral at bedtime PRN  NIFEdipine XL 30 milliGRAM(s) Oral two times a day  ondansetron Injectable 4 milliGRAM(s) IV Push every 8 hours PRN  piperacillin/tazobactam IVPB.. 3.375 Gram(s) IV Intermittent every 8 hours  sodium chloride 0.9%. 1000 milliLiter(s) IV Continuous <Continuous>  tacrolimus 1.5 milliGRAM(s) Oral <User Schedule>  trimethoprim   80 mG/sulfamethoxazole 400 mG 1 Tablet(s) Oral daily      Review of Systems:  A 10-point review of systems was obtained.   Review of systems otherwise negative except as previously noted.    Allergies: No Known Allergies    For details regarding the patient's past medical history, social history, family history, and other miscellaneous elements, please refer the initial infectious diseases consultation and/or the admitting history and physical examination for this admission.    Objective:  Vitals:   T(C): 37.2 (05-23-24 @ 11:21), Max: 37.2 (05-22-24 @ 13:38)  HR: 69 (05-23-24 @ 11:21) (69 - 76)  BP: 123/68 (05-23-24 @ 11:21) (123/68 - 135/57)  RR: 18 (05-23-24 @ 11:21) (18 - 18)  SpO2: 96% (05-23-24 @ 11:21) (94% - 96%)    Physical Examination:  General: no acute distress  HEENT: NC/AT, EOMI,  Cardio: S1, S2 heard, RRR, no murmurs  Resp: symmetric chest rise  Abd: soft, NT, ND,  Ext: no edema or cyanosis, dressing in place  Skin: warm, dry, no visible rash      Laboratory Studies:  CBC:                       12.0   8.18  )-----------( 190      ( 23 May 2024 07:58 )             36.2     CMP: 05-23    134<L>  |  101  |  25<H>  ----------------------------<  165<H>  3.6   |  29  |  1.70<H>    Ca    9.1      23 May 2024 07:58  Phos  3.6     05-22  Mg     2.0     05-22        Urinalysis Basic - ( 23 May 2024 07:58 )    Color: x / Appearance: x / SG: x / pH: x  Gluc: 165 mg/dL / Ketone: x  / Bili: x / Urobili: x   Blood: x / Protein: x / Nitrite: x   Leuk Esterase: x / RBC: x / WBC x   Sq Epi: x / Non Sq Epi: x / Bacteria: x        Microbiology: reviewed    Culture - Tissue with Gram Stain (collected 05-21-24 @ 11:30)  Source: .Tissue Other, RIGHT FOOT BONE CULTURE  Gram Stain (05-22-24 @ 02:54):    No polymorphonuclear cells seen per low power field    No organisms seen per oil power field  Preliminary Report (05-22-24 @ 20:13):    No growth    Culture - Other (collected 05-20-24 @ 20:00)  Source: Wound Wound  Preliminary Report (05-22-24 @ 18:18):    Normal skin fady isolated    Culture - Blood (collected 05-20-24 @ 10:10)  Source: .Blood Blood-Peripheral  Preliminary Report (05-22-24 @ 16:01):    No growth at 48 Hours    Culture - Blood (collected 05-20-24 @ 10:05)  Source: .Blood Blood-Peripheral  Preliminary Report (05-22-24 @ 16:01):    No growth at 48 Hours          Radiology: reviewed

## 2024-05-23 NOTE — PROGRESS NOTE ADULT - PROBLEM SELECTOR PLAN 2
- Chronic, stable   - Continue home Carvedilol 12.5mg BID  - Continue heparin gtt. Will switch back to Eliquis once cleared by Podiatry   - Cardio consulted  dr davies

## 2024-05-23 NOTE — CONSULT NOTE ADULT - PROBLEM SELECTOR RECOMMENDATION 9
cont current insulin pump settings  cont cons cho diet  goal bg 100-180 in hosp setting
Patient was seen and evaluated   Patient's wound with warmth,  erythema, edema , increased drainage,   Applied silver alginate and sterile dry dressing to the right foot  Recommend IV antibiotics per infectious disease   Recommend vascular consult   Procedure discussed at length with patient regarding risks, complications, benefits, as well as pre/intra/post-operative expectations. Patient verbally consents to procedure and understood discussion regarding procedure and all questions were answered to patient's satisfaction at this time.  Patient scheduled for right foot exostectomy for tomorrow 5/21/24 with Dr. Catherine.  Discussed with patient and family that patient is still high risk for more proximal amputation, loss of limb, sepsis and loss of life.   Request medical and cardiac optimization and risk stratification  Please keep patient NPO after midnight for surgery scheduled tomorrow
Type 1 A1c % adm  c/w humalog in insulin pump @ current setting, pt will self bolus according accuchecks  c/w dexcom g6 cgm, accuheck ACHS  CC diet  Recommend endocrine-Perlman onconsult  FU appt: TBA  DSC recommendations: return to home regimen and glucose monitoring  diabetes education provided as documented above  Diabetes support info and cell # [phone] given   Goal 100-180 mg/dL; 140-180 mg/dL in critical care areas

## 2024-05-23 NOTE — PROGRESS NOTE ADULT - SUBJECTIVE AND OBJECTIVE BOX
[facility] -- Luly Egan, Karan Kelly Savella, , Jay Gagnon  Office # [phone]    Follow Up:  Cardiac optimization    Subjective/Observations: No acute events overnight. Patient seen and examined at bedside. Endorses feeling well. Denies chest pain, palpitations, SOB/WOOTEN, leg edema, or any other complaints.     REVIEW OF SYSTEMS: All other review of systems is negative unless indicated above  PAST MEDICAL & SURGICAL HISTORY:  Hypertension      Hyperlipidemia      Diabetes mellitus  Type 1 on insulin pump      CKD (chronic kidney disease)  Renal Transplant 2013 at [facility]      Diabetic peripheral neuropathy      Obesity (BMI 30-39.9)      CVA (cerebral vascular accident)  Wallenberg Stroke  low L body sensation  low R face sensation  decreased peripheral vision  poor balance  2015        Squamous cell carcinoma of skin of left ear  nose      History of DVT (deep vein thrombosis)  LLE 2016, was on Eliquis x 6 months  Was hospitalized for Rhinovirus at the time, intubated      Recurrent squamous cell carcinoma of skin  nose, L arm      History of pleural effusion  right thoracentesis 6/2022 and 8/2022      CARMEN treated with BiPAP  2L O2 at night      History of restrictive lung disease      Toe infection  2007 L 4th Toe surgery-amputation secondary to osteomyelitis  2019 partial right 2nd 4th toe amputation      Ear disease  Left inner ear surgery, 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, Can NOT have MRI      Vasectomy status  s/p b/l  vasectomy      H/O foot surgery  2005 - right foot - bone fracture - s/p ORIF and subsequent removal of hardware      End-stage renal failure with renal transplant  12/2013      S/P kidney transplant  2013      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      History of loop recorder  2015        MEDICATIONS  (STANDING):  apixaban 5 milliGRAM(s) Oral two times a day  aspirin  chewable 81 milliGRAM(s) Oral daily  atorvastatin 10 milliGRAM(s) Oral at bedtime  azaTHIOprine 100 milliGRAM(s) Oral daily  carvedilol 12.5 milliGRAM(s) Oral every 12 hours  cloNIDine 0.2 milliGRAM(s) Oral three times a day  dextrose 10% Bolus 125 milliLiter(s) IV Bolus once  dextrose 5%. 1000 milliLiter(s) (50 mL/Hr) IV Continuous <Continuous>  dextrose 5%. 1000 milliLiter(s) (100 mL/Hr) IV Continuous <Continuous>  dextrose 50% Injectable 12.5 Gram(s) IV Push once  dextrose 50% Injectable 25 Gram(s) IV Push once  gabapentin 300 milliGRAM(s) Oral two times a day  glucagon  Injectable 1 milliGRAM(s) IntraMuscular once  hydrALAZINE 100 milliGRAM(s) Oral three times a day  insulin lispro (HumaLOG) Pump 1 Each SubCutaneous Continuous Pump  losartan 25 milliGRAM(s) Oral at bedtime  NIFEdipine XL 30 milliGRAM(s) Oral two times a day  piperacillin/tazobactam IVPB.. 3.375 Gram(s) IV Intermittent every 8 hours  sodium chloride 0.9%. 1000 milliLiter(s) (100 mL/Hr) IV Continuous <Continuous>  tacrolimus 1.5 milliGRAM(s) Oral <User Schedule>  trimethoprim   80 mG/sulfamethoxazole 400 mG 1 Tablet(s) Oral daily    MEDICATIONS  (PRN):  acetaminophen     Tablet .. 650 milliGRAM(s) Oral every 6 hours PRN Temp greater or equal to 38C (100.4F), Mild Pain (1 - 3)  aluminum hydroxide/magnesium hydroxide/simethicone Suspension 30 milliLiter(s) Oral every 4 hours PRN Dyspepsia  dextrose Oral Gel 15 Gram(s) Oral once PRN Blood Glucose LESS THAN 70 milliGRAM(s)/deciliter  melatonin 3 milliGRAM(s) Oral at bedtime PRN Insomnia  ondansetron Injectable 4 milliGRAM(s) IV Push every 8 hours PRN Nausea and/or Vomiting    Allergies    No Known Allergies    Intolerances      Vital Signs Last 24 Hrs  T(C): 36.6 (23 May 2024 04:55), Max: 38 (22 May 2024 11:26)  T(F): 97.9 (23 May 2024 04:55), Max: 100.4 (22 May 2024 11:26)  HR: 73 (23 May 2024 04:55) (72 - 76)  BP: 135/57 (23 May 2024 04:55) (122/70 - 135/57)  BP(mean): --  RR: 18 (23 May 2024 04:55) (17 - 18)  SpO2: 95% (23 May 2024 04:55) (92% - 95%)    Parameters below as of 23 May 2024 04:55  Patient On (Oxygen Delivery Method): room air      I&O's Summary      PHYSICAL EXAM:  TELE: Afib 60-70s  Constitutional: NAD, awake and alert, well-developed  HEENT: Moist Mucous Membranes, Anicteric  Pulmonary: Non-labored, breath sounds are clear bilaterally, No wheezing, rales or rhonchi  Cardiovascular: Irregular, S1 and S2, No murmurs, rubs, gallops or clicks  Gastrointestinal: Bowel Sounds present, soft, nontender.   Lymph: No peripheral edema. No lymphadenopathy.  Skin: No visible rashes or ulcers.  Psych:  Mood & affect appropriate  LABS: All Labs Reviewed:                        12.0   8.18  )-----------( 190      ( 23 May 2024 07:58 )             36.2                         12.9   8.44  )-----------( 181      ( 22 May 2024 08:35 )             39.1                         13.3   9.43  )-----------( 197      ( 21 May 2024 20:26 )             39.4     23 May 2024 07:58    134    |  101    |  25     ----------------------------<  165    3.6     |  29     |  1.70   22 May 2024 08:35    139    |  104    |  19     ----------------------------<  107    3.9     |  31     |  1.40   21 May 2024 08:00    141    |  107    |  14     ----------------------------<  103    4.1     |  32     |  1.10     Ca    9.1        23 May 2024 07:58  Ca    8.8        22 May 2024 08:35  Ca    9.0        21 May 2024 08:00  Phos  3.6       22 May 2024 08:35  Mg     2.0       22 May 2024 08:35    TPro  6.8    /  Alb  3.0    /  TBili  0.5    /  DBili  x      /  AST  19     /  ALT  18     /  AlkPhos  94     21 May 2024 08:00    PTT - ( 23 May 2024 07:58 )  PTT:32.6 sec      12 Lead ECG:   Ventricular Rate 70 BPM    Atrial Rate 110 BPM    QRS Duration 100 ms    Q-T Interval 400 ms    QTC Calculation(Bazett) 432 ms    R Axis -4 degrees    T Axis 19 degrees    Diagnosis Line Atrial fibrillation  Abnormal ECG  When compared with ECG of 20-MAY-2024 10:00,  No significant change was found  Confirmed by Marko Mauricio MD ([id number]) on 5/21/2024 12:46:31 PM (05-21-24 @ 09:12)      TRANSTHORACIC ECHOCARDIOGRAM REPORT  ________________________________________________________________________________                                      _______       Pt. Name:       LUTHER NORIEGA Study Date:    5/20/2024  MRN:            [id number]            YOB: 1965  Accession #:    [id number]           Age:           58 years  Account#:       [id number]          Gender:        M  Visit ID#  Heart Rate:                         Height:        75.98 in (193.00 cm)  Rhythm:                            Weight:        328.48 lb (149.00 kg)  Blood Pressure: 117/71 mmHg         BSA/BMI:       2.74 m² / 40.00 kg/m²  ________________________________________________________________________________________  Referring Physician:   [id number] Kristian Castro  Interpreting Physician: Marko Mauricio  Primary Sonographer:    Ferdinand Pereira    CPT:               ECHO TTE WO CON COMP W DOPP - 05215.m  Indication(s):     Abnormal electrocardiogram ECG/EKG - R94.31  Procedure:         Transthoracic echocardiogram with 2-D, M-mode and complete                     spectral and color flow Doppler.  Ordering Location: [facility]  Admission Status:  ED  Study Information: Image quality for this study is technically difficult.    _______________________________________________________________________________________     CONCLUSIONS:      1. Technically difficult image quality.   2. Left ventricular systolic function is normal with an ejection fraction of 58 % by Garay's method of disks.   3. The right ventricle is not well visualized. probably normal systolic function.   4. The left atrium is normal in size.   5. The right atrium is normal in size.   6. Tricuspid aortic valve with normal leaflet excursion. There is focal calcification of the aortic valve leaflets.   7. There is mild calcification of the mitral valve annulus.   8. Trace mitral regurgitation.   9. Trace tricuspid regurgitation.  10. The inferior vena cava is normal in size measuring 1.89 cm in diameter, (normal <2.1cm) with normal inspiratory collapse (normal >50%) consistent with normal right atrial pressure (~3, range 0-5mmHg).    ________________________________________________________________________________________  FINDINGS:     Left Ventricle:  Left ventricular systolic function is normal with a calculated ejection fraction of 58 % by the Garay's biplane method of disks. The left ventricular diastolic function is indeterminate.     Right Ventricle:  The right ventricle is not well visualized. Probably normal systolic function. Tricuspid annular plane systolic excursion (TAPSE) is 1.6 cm (normal >=1.7 cm).     Left Atrium:  The left atrium is normal in size with an indexed volume of 23.65 ml/m².     Right Atrium:  The right atrium is normal in size.     Aortic Valve:  The aortic valve is tricuspid with normal leaflet excursion. There is focal calcification of the aortic valve leaflets.     Mitral Valve:  There is mild calcification of the mitral valve annulus. There is trace mitral regurgitation.     Tricuspid Valve:  There is trace tricuspid regurgitation.     Pulmonic Valve:  The pulmonic valve was not well visualized. There is trace pulmonic regurgitation.     Aorta:  The aortic root at the sinuses of Valsalva is normal in size, measuring 3.20 cm (indexed 1.17 cm/m²). The aortic arch diameter is normal in size, measuring 2.8 cm (indexed 1.02 cm/m²).     Systemic Veins:  The inferior vena cava is normal in size measuring 1.89 cm in diameter, (normal <2.1cm) with normal inspiratory collapse (normal >50%) consistent with normal right atrial pressure (~3, range 0-5mmHg).  ____________________________________________________________________  QUANTITATIVE DATA:  Left Ventricle Measurements: (Indexed to BSA)     IVSd (2D):   1.2 cm  LVPWd (2D):  1.1 cm  LVIDd (2D):  4.9 cm  LVIDs (2D):  3.1 cm  LV Mass:     222 g  81.0 g/m²  LV Vol d, MOD A2C: 68.5 ml 25.04 ml/m²  LV Vol d, MOD A4C: 78.7 ml 28.77 ml/m²  LV Vol d, MOD BP:  75.3 ml 27.52 ml/m²  LV Vol s, MOD A2C: 23.4 ml 8.55 ml/m²  LV Vol s, MOD A4C: 34.1 ml 12.47 ml/m²  LV Vol s, MOD BP:  31.5 ml 11.50 ml/m²  LVOT SV MOD BP:    43.8 ml  LV EF% MOD BP:     58 %     MV E Vmax:    1.18 m/s  MV A Vmax:    0.35 m/s  MV E/A:       3.41  e' lateral:   10.90 cm/s  e' medial:    11.00cm/s  E/e' lateral: 10.83  E/e' medial:  10.73  E/e' Average: 10.78  MV DT:        219 msec    Aorta Measurements: (Normal range) (Indexed to BSA)     Sinuses of Valsalva: 3.20 cm (3.1 - 3.7 cm)  Ao Arch:             2.8 cm       Left Atrium Measurements: (Indexed to BSA)  LA Diam 2D: 4.10 cm    Right Ventricle Measurements:     TAPSE:            1.6 cm  RV Base (RVID1):  3.8 cm  RV Mid (RVID2):   3.3 cm  RV Major (RVID3): 6.7 cm       LVOT / RVOT/ Qp/Qs Data: (Indexed to BSA)  LVOT Diameter: 2.20 cm  LVOT Area:     3.80 cm²    Mitral Valve Measurements:     MV E Vmax: 1.2 m/s  MV A Vmax: 0.3 m/s  MV E/A:    3.4       Tricuspid Valve Measurements:     RA Pressure: 3 mmHg    ________________________________________________________________________________________  Electronically signed on 5/20/2024 at 5:52:45 PM by Marko Mauricio         *** Final ***

## 2024-05-24 ENCOUNTER — TRANSCRIPTION ENCOUNTER (OUTPATIENT)
Age: 59
End: 2024-05-24

## 2024-05-24 LAB
ALBUMIN SERPL ELPH-MCNC: 2.6 G/DL — LOW (ref 3.3–5)
ALP SERPL-CCNC: 75 U/L — SIGNIFICANT CHANGE UP (ref 40–120)
ALT FLD-CCNC: 13 U/L — SIGNIFICANT CHANGE UP (ref 12–78)
ANION GAP SERPL CALC-SCNC: 6 MMOL/L — SIGNIFICANT CHANGE UP (ref 5–17)
AST SERPL-CCNC: 15 U/L — SIGNIFICANT CHANGE UP (ref 15–37)
BILIRUB SERPL-MCNC: 0.7 MG/DL — SIGNIFICANT CHANGE UP (ref 0.2–1.2)
BUN SERPL-MCNC: 25 MG/DL — HIGH (ref 7–23)
CALCIUM SERPL-MCNC: 9.2 MG/DL — SIGNIFICANT CHANGE UP (ref 8.5–10.1)
CHLORIDE SERPL-SCNC: 105 MMOL/L — SIGNIFICANT CHANGE UP (ref 96–108)
CO2 SERPL-SCNC: 27 MMOL/L — SIGNIFICANT CHANGE UP (ref 22–31)
CREAT SERPL-MCNC: 1.6 MG/DL — HIGH (ref 0.5–1.3)
EGFR: 50 ML/MIN/1.73M2 — LOW
GLUCOSE SERPL-MCNC: 147 MG/DL — HIGH (ref 70–99)
GRAM STN FLD: ABNORMAL
HCT VFR BLD CALC: 35.9 % — LOW (ref 39–50)
HGB BLD-MCNC: 11.7 G/DL — LOW (ref 13–17)
MCHC RBC-ENTMCNC: 30.1 PG — SIGNIFICANT CHANGE UP (ref 27–34)
MCHC RBC-ENTMCNC: 32.6 GM/DL — SIGNIFICANT CHANGE UP (ref 32–36)
MCV RBC AUTO: 92.3 FL — SIGNIFICANT CHANGE UP (ref 80–100)
NRBC # BLD: 0 /100 WBCS — SIGNIFICANT CHANGE UP (ref 0–0)
PLATELET # BLD AUTO: 175 K/UL — SIGNIFICANT CHANGE UP (ref 150–400)
POTASSIUM SERPL-MCNC: 4 MMOL/L — SIGNIFICANT CHANGE UP (ref 3.5–5.3)
POTASSIUM SERPL-SCNC: 4 MMOL/L — SIGNIFICANT CHANGE UP (ref 3.5–5.3)
PROT SERPL-MCNC: 6.9 G/DL — SIGNIFICANT CHANGE UP (ref 6–8.3)
RBC # BLD: 3.89 M/UL — LOW (ref 4.2–5.8)
RBC # FLD: 13.9 % — SIGNIFICANT CHANGE UP (ref 10.3–14.5)
SODIUM SERPL-SCNC: 138 MMOL/L — SIGNIFICANT CHANGE UP (ref 135–145)
TACROLIMUS SERPL-MCNC: 6.8 NG/ML — SIGNIFICANT CHANGE UP
WBC # BLD: 7.86 K/UL — SIGNIFICANT CHANGE UP (ref 3.8–10.5)
WBC # FLD AUTO: 7.86 K/UL — SIGNIFICANT CHANGE UP (ref 3.8–10.5)

## 2024-05-24 PROCEDURE — 99232 SBSQ HOSP IP/OBS MODERATE 35: CPT

## 2024-05-24 PROCEDURE — 99233 SBSQ HOSP IP/OBS HIGH 50: CPT

## 2024-05-24 RX ADMIN — ATORVASTATIN CALCIUM 10 MILLIGRAM(S): 80 TABLET, FILM COATED ORAL at 21:06

## 2024-05-24 RX ADMIN — Medication 100 MILLIGRAM(S): at 05:58

## 2024-05-24 RX ADMIN — CARVEDILOL PHOSPHATE 12.5 MILLIGRAM(S): 80 CAPSULE, EXTENDED RELEASE ORAL at 05:57

## 2024-05-24 RX ADMIN — Medication 30 MILLIGRAM(S): at 18:14

## 2024-05-24 RX ADMIN — GABAPENTIN 300 MILLIGRAM(S): 400 CAPSULE ORAL at 05:57

## 2024-05-24 RX ADMIN — SODIUM CHLORIDE 100 MILLILITER(S): 9 INJECTION INTRAMUSCULAR; INTRAVENOUS; SUBCUTANEOUS at 18:13

## 2024-05-24 RX ADMIN — APIXABAN 5 MILLIGRAM(S): 2.5 TABLET, FILM COATED ORAL at 09:47

## 2024-05-24 RX ADMIN — PIPERACILLIN AND TAZOBACTAM 25 GRAM(S): 4; .5 INJECTION, POWDER, LYOPHILIZED, FOR SOLUTION INTRAVENOUS at 05:58

## 2024-05-24 RX ADMIN — TACROLIMUS 1.5 MILLIGRAM(S): 5 CAPSULE ORAL at 09:47

## 2024-05-24 RX ADMIN — Medication 30 MILLIGRAM(S): at 05:57

## 2024-05-24 RX ADMIN — GABAPENTIN 300 MILLIGRAM(S): 400 CAPSULE ORAL at 18:13

## 2024-05-24 RX ADMIN — Medication 1 TABLET(S): at 13:05

## 2024-05-24 RX ADMIN — Medication 1 TABLET(S): at 18:14

## 2024-05-24 RX ADMIN — CARVEDILOL PHOSPHATE 12.5 MILLIGRAM(S): 80 CAPSULE, EXTENDED RELEASE ORAL at 18:14

## 2024-05-24 RX ADMIN — Medication 100 MILLIGRAM(S): at 13:05

## 2024-05-24 RX ADMIN — SODIUM CHLORIDE 75 MILLILITER(S): 9 INJECTION INTRAMUSCULAR; INTRAVENOUS; SUBCUTANEOUS at 06:02

## 2024-05-24 RX ADMIN — Medication 81 MILLIGRAM(S): at 13:04

## 2024-05-24 RX ADMIN — TACROLIMUS 1.5 MILLIGRAM(S): 5 CAPSULE ORAL at 21:06

## 2024-05-24 RX ADMIN — Medication 0.2 MILLIGRAM(S): at 05:58

## 2024-05-24 RX ADMIN — Medication 100 MILLIGRAM(S): at 21:06

## 2024-05-24 RX ADMIN — AZATHIOPRINE 100 MILLIGRAM(S): 100 TABLET ORAL at 13:05

## 2024-05-24 RX ADMIN — Medication 0.2 MILLIGRAM(S): at 13:05

## 2024-05-24 RX ADMIN — APIXABAN 5 MILLIGRAM(S): 2.5 TABLET, FILM COATED ORAL at 21:06

## 2024-05-24 RX ADMIN — Medication 0.2 MILLIGRAM(S): at 21:07

## 2024-05-24 NOTE — DISCHARGE NOTE NURSING/CASE MANAGEMENT/SOCIAL WORK - PATIENT PORTAL LINK FT
You can access the FollowMyHealth Patient Portal offered by [facility] by registering at the following website: [email or web address]. By joining [facility]’s FollowMyHealth portal, you will also be able to view your health information using other applications (apps) compatible with our system.

## 2024-05-24 NOTE — PROGRESS NOTE ADULT - TIME BILLING
Note written by attending. Meds, labs, vitals, chart reviewed. Plan d/w patient, RN, SW/CM
Note written by attending. Meds, labs, vitals, chart reviewed. Plan d/w patient
Note written by attending. Meds, labs, vitals, chart reviewed. Plan d/w patient, RN, SW/CM
pt seen and examine today see above plan -  hx dm type1 on insulin pump  renal transplant / immuno suppressed   non healing right  dm  foot wound.   - Patient scheduled  today for right foot exostectomy  with Dr. Catherine  , Continue IV Cefepime 2g q8hrs for osteomyelitis coverage  id  dr gina sears -  caty  OR  cult  / path    .   hx cva 2015    on asa  now  new afib  - rate controlled  on home coreg / started heparin  drip .

## 2024-05-24 NOTE — DISCHARGE NOTE NURSING/CASE MANAGEMENT/SOCIAL WORK - NSDCVIVACCINE_GEN_ALL_CORE_FT
influenza, injectable, quadrivalent, preservative free; 27-Nov-2015 13:32; Jv Zhu (RN); [employer]; [id number]; IntraMuscular; Deltoid Right.; 0.5 milliLiter(s); VIS (VIS Published: 07-Aug-2015, VIS Presented: 27-Nov-2015);   influenza, injectable, quadrivalent, preservative free; 22-Sep-2021 13:06; Eugene Mccarthy (RN); [employer]; [id number] (Exp. Date: 30-Jun-2022); IntraMuscular; Deltoid Left.; 0.5 milliLiter(s); VIS (VIS Published: 15-Aug-2019, VIS Presented: 22-Sep-2021);   Tdap; 12-Jul-2023 22:48; Kailey Woods (RN); [employer]; [id number] (Exp. Date: 01-May-2025); IntraMuscular; Deltoid Right.; 0.5 milliLiter(s); VIS (VIS Published: 09-May-2013, VIS Presented: 12-Jul-2023);

## 2024-05-24 NOTE — CONSULT NOTE ADULT - REASON FOR ADMISSION
Right foot wound

## 2024-05-24 NOTE — PROGRESS NOTE ADULT - SUBJECTIVE AND OBJECTIVE BOX
[facility], Division of Infectious Diseases  MEL Mckoy Y. Patel, S. Shah, G. [facility]  [phone]    Name: LUTHER NORIEGA  Age: 58y  Gender: Male  MRN: [id number]    Interval History:  Patient seen and examined at bedside   No acute overnight events. Afebrile  No complaints  Notes reviewed    Antibiotics:  amoxicillin  875 milliGRAM(s)/clavulanate 1 Tablet(s) Oral two times a day  trimethoprim   80 mG/sulfamethoxazole 400 mG 1 Tablet(s) Oral daily      Medications:  acetaminophen     Tablet .. 650 milliGRAM(s) Oral every 6 hours PRN  aluminum hydroxide/magnesium hydroxide/simethicone Suspension 30 milliLiter(s) Oral every 4 hours PRN  amoxicillin  875 milliGRAM(s)/clavulanate 1 Tablet(s) Oral two times a day  apixaban 5 milliGRAM(s) Oral two times a day  aspirin  chewable 81 milliGRAM(s) Oral daily  atorvastatin 10 milliGRAM(s) Oral at bedtime  azaTHIOprine 100 milliGRAM(s) Oral daily  carvedilol 12.5 milliGRAM(s) Oral every 12 hours  cloNIDine 0.2 milliGRAM(s) Oral three times a day  dextrose 10% Bolus 125 milliLiter(s) IV Bolus once  dextrose 5%. 1000 milliLiter(s) IV Continuous <Continuous>  dextrose 5%. 1000 milliLiter(s) IV Continuous <Continuous>  dextrose 50% Injectable 25 Gram(s) IV Push once  dextrose 50% Injectable 12.5 Gram(s) IV Push once  dextrose Oral Gel 15 Gram(s) Oral once PRN  gabapentin 300 milliGRAM(s) Oral two times a day  glucagon  Injectable 1 milliGRAM(s) IntraMuscular once  hydrALAZINE 100 milliGRAM(s) Oral three times a day  insulin lispro (HumaLOG) Pump 1 Each SubCutaneous Continuous Pump  melatonin 3 milliGRAM(s) Oral at bedtime PRN  NIFEdipine XL 30 milliGRAM(s) Oral two times a day  ondansetron Injectable 4 milliGRAM(s) IV Push every 8 hours PRN  sodium chloride 0.9%. 1000 milliLiter(s) IV Continuous <Continuous>  sodium chloride 0.9%. 1000 milliLiter(s) IV Continuous <Continuous>  tacrolimus 1.5 milliGRAM(s) Oral <User Schedule>  trimethoprim   80 mG/sulfamethoxazole 400 mG 1 Tablet(s) Oral daily      Review of Systems:  A 10-point review of systems was obtained.   Review of systems otherwise negative except as previously noted.    Allergies: No Known Allergies    For details regarding the patient's past medical history, social history, family history, and other miscellaneous elements, please refer the initial infectious diseases consultation and/or the admitting history and physical examination for this admission.    Objective:  Vitals:   T(C): 36.9 (05-24-24 @ 05:24), Max: 37.6 (05-23-24 @ 21:04)  HR: 76 (05-24-24 @ 05:24) (73 - 79)  BP: 131/71 (05-24-24 @ 05:24) (105/55 - 131/71)  RR: 17 (05-24-24 @ 05:24) (17 - 17)  SpO2: 95% (05-24-24 @ 05:24) (95% - 95%)    Physical Examination:  General: no acute distress  HEENT: NC/AT, EOMI,  Cardio: RRR  Resp: symmetric chest rise  Abd: soft, NT, ND  Ext: leg in dressing  Skin: warm, dry, no visible rash      Laboratory Studies:  CBC:                       11.7   7.86  )-----------( 175      ( 24 May 2024 07:16 )             35.9     CMP: 05-24    138  |  105  |  25<H>  ----------------------------<  147<H>  4.0   |  27  |  1.60<H>    Ca    9.2      24 May 2024 07:16    TPro  6.9  /  Alb  2.6<L>  /  TBili  0.7  /  DBili  x   /  AST  15  /  ALT  13  /  AlkPhos  75  05-24    LIVER FUNCTIONS - ( 24 May 2024 07:16 )  Alb: 2.6 g/dL / Pro: 6.9 g/dL / ALK PHOS: 75 U/L / ALT: 13 U/L / AST: 15 U/L / GGT: x           Urinalysis Basic - ( 24 May 2024 07:16 )    Color: x / Appearance: x / SG: x / pH: x  Gluc: 147 mg/dL / Ketone: x  / Bili: x / Urobili: x   Blood: x / Protein: x / Nitrite: x   Leuk Esterase: x / RBC: x / WBC x   Sq Epi: x / Non Sq Epi: x / Bacteria: x        Microbiology: reviewed    Culture - Tissue with Gram Stain (collected 05-21-24 @ 11:30)  Source: .Tissue Other, RIGHT FOOT BONE CULTURE  Gram Stain (05-22-24 @ 02:54):    No polymorphonuclear cells seen per low power field    No organisms seen per oil power field  Preliminary Report (05-22-24 @ 20:13):    No growth    Culture - Other (collected 05-20-24 @ 20:00)  Source: Wound Wound  Final Report (05-23-24 @ 15:53):    Normal skin fady isolated    Culture - Blood (collected 05-20-24 @ 10:10)  Source: .Blood Blood-Peripheral  Preliminary Report (05-23-24 @ 16:01):    No growth at 72 Hours    Culture - Blood (collected 05-20-24 @ 10:05)  Source: .Blood Blood-Peripheral  Preliminary Report (05-23-24 @ 16:01):    No growth at 72 Hours          Radiology: reviewed      Pathology Reviewed:    Surgical Pathology Report:   ACCESSION No: 30 [id number]   Patient: LUTHER NORIEGA   Accession: [id number]   Collected Date/Time: 5/21/2024 11:00 EDT   Received Date/Time: 5/21/2024 13:01 EDT   Surgical Pathology Report - Auth (Verified)   Specimen(s) Submitted   1 Right foot bone pathology   Final Diagnosis   1 Right foot bone   Fibroconnective tissue with reactive change. No bone identified.   Verified by: Enzo Millan MD   (Electronic Signature)   Reported on: 05/23/24 12:07 EDT, [facility], [address] [place] [address]

## 2024-05-24 NOTE — CONSULT NOTE ADULT - CONSULT REASON
Non healing R foot wound
Right foot chronic non healing wound
cardiac clearance
MAYRA, h/o Kidney transplant
R foot wound
dm2 uncontrolled
58y  diabetes mellitus uncontrolled type 1

## 2024-05-24 NOTE — PROGRESS NOTE ADULT - SUBJECTIVE AND OBJECTIVE BOX
PROGRESS NOTE   Patient is a 58y old  Male who presents with a chief complaint of Right foot wound (24 May 2024 12:16)      HPI:  Patient is a 59yo M with a PMH of type 1 diabetes mellitus, HTN, HLD, CVA (), prior DVT (s/p Eliquis), recurrent right pleural effusion with VATS, renal transplant (), sent in from wound care for a non healing right foot wound. Patient has been following with outpatient wound care for the non healing wound on his right foot, and has noted that it has progressively gotten worse. He was sent in for a right  foot exostectomy for tomorrow 24 with Dr. Catherine. Patient notes that there has been a small amount of bloody drainage from the wound, but denies any pain, pus or foul smell. He is able to bear weight on his right foot and does not have any problems walking. He has peripheral neuropathy so he has decreased sensation in his b/l LE, but no tingling. Denies fever, chills, chest pain, palpitations, SOB, cough, abdominal pain, n/v/d/c, dysuria, hematuria, changes in vision, dizziness,       ED course:  Vitals: /74, HR 84, T 98.2, RR 16 SpO2 100% on RA   Labs: H/H 12.6/38.8, BUN/Cr 25/1.2  Given: IV Zosyn x1, IV Vancomycin 1g x1, 1L NaCl bolus x1   EKbpm, QTc 418, Afib      Imaging  Cxray: No acute finding or change.  Right Foot Xray:  Status post resection of the fifth ray at the level of the distal metatarsal and proximal phalanx. Status post resection of the second and third digits. No cortical erosion or periosteal reaction. There is a plantar calcaneal enthesophyte. Strength is present within the medial navicular.   (20 May 2024 12:52)      Vital Signs Last 24 Hrs  T(C): 36.9 (24 May 2024 12:37), Max: 37.6 (23 May 2024 21:04)  T(F): 98.4 (24 May 2024 12:37), Max: 99.6 (23 May 2024 21:04)  HR: 80 (24 May 2024 12:37) (73 - 80)  BP: 135/63 (24 May 2024 12:37) (105/55 - 135/63)  BP(mean): --  RR: 17 (24 May 2024 12:37) (17 - 17)  SpO2: 93% (24 May 2024 12:37) (93% - 95%)    Parameters below as of 24 May 2024 12:37  Patient On (Oxygen Delivery Method): room air                              11.7   7.86  )-----------( 175      ( 24 May 2024 07:16 )             35.9               05-24    138  |  105  |  25<H>  ----------------------------<  147<H>  4.0   |  27  |  1.60<H>    Ca    9.2      24 May 2024 07:16    TPro  6.9  /  Alb  2.6<L>  /  TBili  0.7  /  DBili  x   /  AST  15  /  ALT  13  /  AlkPhos  75  -      PHYSICAL EXAM  PHYSICAL EXAM  Vascular: DP & PT palpable bilaterally, Capillary refill 3 seconds, Digital hair absent bilaterally  Neurological: Light touch sensation diminished b/l   Musculoskeletal: 5/5 strength in all quadrants bilaterally, AJ & STJ ROM intact  Dermatological: s/p right foot exostectomy.  Incision site is clean, sutures intact no dehiscence   2.0cm x 1.0cm x down to bone with tracking noted to the wound ,(+)  probe to bone, mild  periwound erythema, no fluctuance, no malodor, no proximal streaking at this time    Surgical Pathology Report - Auth (Verified)    Specimen(s) Submitted  1  Right foot bone pathology    Final Diagnosis  1  Right foot bone  Fibroconnective tissue with reactive change. No bone identified.

## 2024-05-24 NOTE — DISCHARGE NOTE NURSING/CASE MANAGEMENT/SOCIAL WORK - NSDCPEFALRISK_GEN_ALL_CORE
For information on Fall & Injury Prevention, visit: [email or web address] OR  [email or web address] OR  https://www.cdc.gov/steadi/patient.html

## 2024-05-24 NOTE — PROGRESS NOTE ADULT - PROBLEM SELECTOR PLAN 1
- Continue local wound care with silver alginate and sterile dry dressing to the right foot  - S/p right foot exostectomy 5/21/24 with Dr. Catherine, pathology reviewed.   - Blood cultures NGTD   - Podiatry following   - ID follow up for recommendations about antibiotics, on zosyn for now   - Vascular following   -Wound care/ dressing change per Podiatry

## 2024-05-24 NOTE — CONSULT NOTE ADULT - SUBJECTIVE AND OBJECTIVE BOX
Patient is a 58y old  Male who presents with a chief complaint of Right foot wound (24 May 2024 08:55)    HPI:  Patient is a 57yo M with a PMH of type 1 diabetes mellitus, HTN, HLD, CVA (), prior DVT (s/p Eliquis), recurrent right pleural effusion with VATS, renal transplant (), sent in from wound care for a non healing right foot wound. Patient has been following with outpatient wound care for the non healing wound on his right foot, and has noted that it has progressively gotten worse. He was sent in for a right  foot exostectomy for tomorrow 24 with Dr. Catherine. Patient notes that there has been a small amount of bloody drainage from the wound, but denies any pain, pus or foul smell. He is able to bear weight on his right foot and does not have any problems walking. He has peripheral neuropathy so he has decreased sensation in his b/l LE, but no tingling. Denies fever, chills, chest pain, palpitations, SOB, cough, abdominal pain, n/v/d/c, dysuria, hematuria, changes in vision, dizziness,       ED course:  Vitals: /74, HR 84, T 98.2, RR 16 SpO2 100% on RA   Labs: H/H 12.6/38.8, BUN/Cr 25/1.2  Given: IV Zosyn x1, IV Vancomycin 1g x1, 1L NaCl bolus x1   EKbpm, QTc 418, Afib      Imaging  Cxray: No acute finding or change.  Right Foot Xray:  Status post resection of the fifth ray at the level of the distal metatarsal and proximal phalanx. Status post resection of the second and third digits. No cortical erosion or periosteal reaction. There is a plantar calcaneal enthesophyte. Strength is present within the medial navicular.   (20 May 2024 12:52)      PAST MEDICAL HISTORY:  Hypertension    Hyperlipidemia    Diabetes mellitus    CKD (chronic kidney disease)    Diabetic peripheral neuropathy    Obesity (BMI 30-39.9)    CVA (cerebral vascular accident)    Squamous cell carcinoma of skin of left ear    History of DVT (deep vein thrombosis)    Recurrent squamous cell carcinoma of skin    History of pleural effusion    CARMEN treated with BiPAP    History of restrictive lung disease        PAST SURGICAL HISTORY:  Toe infection    Ear disease    Vasectomy status    H/O foot surgery    End-stage renal failure with renal transplant    S/P kidney transplant    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    History of loop recorder        FAMILY HISTORY:  Family history of diabetes mellitus (DM)    FH: skin cancer        SOCIAL HISTORY:    Allergies    No Known Allergies    Intolerances      Home Medications:  aspirin 81 mg oral tablet: 1 tab(s) orally once a day (20 May 2024 14:07)  azaTHIOprine 100 mg oral tablet: 1 tab(s) orally once a day, am (20 May 2024 14:07)  Bactrim 400 mg-80 mg oral tablet: 1 tab(s) orally once a day, am (20 May 2024 14:07)  carvedilol 12.5 mg oral tablet: 1 tab(s) orally 2 times a day (20 May 2024 14:05)  cloNIDine 0.2 mg oral tablet: 1 tab(s) orally 3 times a day (20 May 2024 14:05)  gabapentin 300 mg oral capsule: 1 tab(s) orally 2 times a day (20 May 2024 14:07)  Humalog pump: 3 unit(s) per hour 24hrs/day with 3 bolus with meals.  (20 May 2024 14:07)  hydrALAZINE 100 mg oral tablet: 1 tab(s) orally 3 times a day (20 May 2024 14:05)  losartan 25 mg oral tablet: 1 tab(s) orally once a day (at bedtime) (20 May 2024 14:05)  lovastatin 40 mg oral tablet: 1 tab(s) orally once a day, pm (20 May 2024 14:07)  NIFEdipine (Eqv-Adalat CC) 30 mg oral tablet, extended release: 1 tab(s) orally 2 times a day (20 May 2024 14:05)  tacrolimus: 1.5 milligram(s) orally 2 times a  (9:30am, 9:30pm)- *strictly timed* (20 May 2024 14:03)    MEDICATIONS  (STANDING):  apixaban 5 milliGRAM(s) Oral two times a day  aspirin  chewable 81 milliGRAM(s) Oral daily  atorvastatin 10 milliGRAM(s) Oral at bedtime  azaTHIOprine 100 milliGRAM(s) Oral daily  carvedilol 12.5 milliGRAM(s) Oral every 12 hours  cloNIDine 0.2 milliGRAM(s) Oral three times a day  dextrose 10% Bolus 125 milliLiter(s) IV Bolus once  dextrose 5%. 1000 milliLiter(s) (50 mL/Hr) IV Continuous <Continuous>  dextrose 5%. 1000 milliLiter(s) (100 mL/Hr) IV Continuous <Continuous>  dextrose 50% Injectable 25 Gram(s) IV Push once  dextrose 50% Injectable 12.5 Gram(s) IV Push once  gabapentin 300 milliGRAM(s) Oral two times a day  glucagon  Injectable 1 milliGRAM(s) IntraMuscular once  hydrALAZINE 100 milliGRAM(s) Oral three times a day  insulin lispro (HumaLOG) Pump 1 Each SubCutaneous Continuous Pump  NIFEdipine XL 30 milliGRAM(s) Oral two times a day  piperacillin/tazobactam IVPB.. 3.375 Gram(s) IV Intermittent every 8 hours  sodium chloride 0.9%. 1000 milliLiter(s) (75 mL/Hr) IV Continuous <Continuous>  sodium chloride 0.9%. 1000 milliLiter(s) (100 mL/Hr) IV Continuous <Continuous>  tacrolimus 1.5 milliGRAM(s) Oral <User Schedule>  trimethoprim   80 mG/sulfamethoxazole 400 mG 1 Tablet(s) Oral daily    MEDICATIONS  (PRN):  acetaminophen     Tablet .. 650 milliGRAM(s) Oral every 6 hours PRN Temp greater or equal to 38C (100.4F), Mild Pain (1 - 3)  aluminum hydroxide/magnesium hydroxide/simethicone Suspension 30 milliLiter(s) Oral every 4 hours PRN Dyspepsia  dextrose Oral Gel 15 Gram(s) Oral once PRN Blood Glucose LESS THAN 70 milliGRAM(s)/deciliter  melatonin 3 milliGRAM(s) Oral at bedtime PRN Insomnia  ondansetron Injectable 4 milliGRAM(s) IV Push every 8 hours PRN Nausea and/or Vomiting      REVIEW OF SYSTEMS:  General:   Respiratory: No cough, SOB  Cardiovascular: No CP or Palpitations	  Gastrointestinal: No nausea, Vomiting. No diarrhea  Genitourinary: No urinary complaints	  Musculoskeletal: No leg swelling, No new rash or lesions	  Neurological: 	  all other systems negative    T(F): 98.5 (24 @ 05:24), Max: 99.6 (24 @ 21:04)  HR: 76 (24 @ 05:24) (69 - 79)  BP: 131/71 (24 @ 05:24) (105/55 - 131/71)  RR: 17 (24 @ 05:24) (17 - 18)  SpO2: 95% (24 @ 05:24) (95% - 96%)  Wt(kg): --    PHYSICAL EXAM:  General: NAD  Respiratory: b/l air entry  Cardiovascular: S1 S2  Gastrointestinal: soft  Extremities: edema            138  |  105  |  25<H>  ----------------------------<  147<H>  4.0   |  27  |  1.60<H>    Ca    9.2      24 May 2024 07:16    TPro  6.9  /  Alb  2.6<L>  /  TBili  0.7  /  DBili  x   /  AST  15  /  ALT  13  /  AlkPhos  75                            11.7   7.86  )-----------( 175      ( 24 May 2024 07:16 )             35.9       Hemoglobin: 11.7 g/dL ( @ 07:16)  Hematocrit: 35.9 % ( @ 07:16)  Potassium: 4.0 mmol/L ( @ 07:16)  Blood Urea Nitrogen: 25 mg/dL ( @ 07:16)      Creatinine, Serum: 1.60 ( @ 07:16)  Creatinine, Serum: 1.70 ( @ 07:58)  Creatinine, Serum: 1.40 ( @ 08:35)      Urinalysis Basic - ( 24 May 2024 07:16 )    Color: x / Appearance: x / SG: x / pH: x  Gluc: 147 mg/dL / Ketone: x  / Bili: x / Urobili: x   Blood: x / Protein: x / Nitrite: x   Leuk Esterase: x / RBC: x / WBC x   Sq Epi: x / Non Sq Epi: x / Bacteria: x      LIVER FUNCTIONS - ( 24 May 2024 07:16 )  Alb: 2.6 g/dL / Pro: 6.9 g/dL / ALK PHOS: 75 U/L / ALT: 13 U/L / AST: 15 U/L / GGT: x                       I&O's Detail    23 May 2024 07:01  -  24 May 2024 07:00  --------------------------------------------------------  IN:  Total IN: 0 mL    OUT:    Voided (mL): 900 mL  Total OUT: 900 mL    Total NET: -900 mL            Culture - Tissue with Gram Stain (collected 21 May 2024 11:30)  Source: .Tissue Other, RIGHT FOOT BONE CULTURE  Gram Stain (22 May 2024 02:54):    No polymorphonuclear cells seen per low power field    No organisms seen per oil power field  Preliminary Report (22 May 2024 20:13):    No growth    Culture - Other (collected 20 May 2024 20:00)  Source: Wound Wound  Final Report (23 May 2024 15:53):    Normal skin fady isolated    Culture - Blood (collected 20 May 2024 10:10)  Source: .Blood Blood-Peripheral  Preliminary Report (23 May 2024 16:01):    No growth at 72 Hours    Culture - Blood (collected 20 May 2024 10:05)  Source: .Blood Blood-Peripheral  Preliminary Report (23 May 2024 16:01):    No growth at 72 Hours         Patient is a 58y old  Male who presents with a chief complaint of Right foot wound (24 May 2024 08:55)    HPI:  Patient is a 59yo M with a PMH of type 1 diabetes mellitus, HTN, HLD, CVA (), prior DVT (s/p Eliquis), recurrent right pleural effusion with VATS, renal transplant (), sent in from wound care for a non healing right foot wound. Patient has been following with outpatient wound care for the non healing wound on his right foot, and has noted that it has progressively gotten worse. He was sent in for a right  foot exostectomy for tomorrow 24 with Dr. Catherine. Patient notes that there has been a small amount of bloody drainage from the wound, but denies any pain, pus or foul smell. He is able to bear weight on his right foot and does not have any problems walking. He has peripheral neuropathy so he has decreased sensation in his b/l LE, but no tingling. Denies fever, chills, chest pain, palpitations, SOB, cough, abdominal pain, n/v/d/c, dysuria, hematuria, changes in vision, dizziness,       ED course:  Vitals: /74, HR 84, T 98.2, RR 16 SpO2 100% on RA   Labs: H/H 12.6/38.8, BUN/Cr 25/1.2  Given: IV Zosyn x1, IV Vancomycin 1g x1, 1L NaCl bolus x1   EKbpm, QTc 418, Afib      Imaging  Cxray: No acute finding or change.  Right Foot Xray:  Status post resection of the fifth ray at the level of the distal metatarsal and proximal phalanx. Status post resection of the second and third digits. No cortical erosion or periosteal reaction. There is a plantar calcaneal enthesophyte. Strength is present within the medial navicular.   (20 May 2024 12:52)    Renal consult called for MAYRA, h/o CKD, Kidney transplant. History obtained from chart and patient.     PAST MEDICAL HISTORY:  Hypertension    Hyperlipidemia    Diabetes mellitus    CKD (chronic kidney disease)    Diabetic peripheral neuropathy    Obesity (BMI 30-39.9)    CVA (cerebral vascular accident)    Squamous cell carcinoma of skin of left ear    History of DVT (deep vein thrombosis)    Recurrent squamous cell carcinoma of skin    History of pleural effusion    CARMEN treated with BiPAP    History of restrictive lung disease        PAST SURGICAL HISTORY:  Toe infection    Ear disease    Vasectomy status    H/O foot surgery    End-stage renal failure with renal transplant    S/P kidney transplant    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    History of loop recorder        FAMILY HISTORY:  Family history of diabetes mellitus (DM)    FH: skin cancer        SOCIAL HISTORY: No smoking or alcohol use     Allergies    No Known Allergies    Intolerances      Home Medications:  aspirin 81 mg oral tablet: 1 tab(s) orally once a day (20 May 2024 14:07)  azaTHIOprine 100 mg oral tablet: 1 tab(s) orally once a day, am (20 May 2024 14:07)  Bactrim 400 mg-80 mg oral tablet: 1 tab(s) orally once a day, am (20 May 2024 14:07)  carvedilol 12.5 mg oral tablet: 1 tab(s) orally 2 times a day (20 May 2024 14:05)  cloNIDine 0.2 mg oral tablet: 1 tab(s) orally 3 times a day (20 May 2024 14:05)  gabapentin 300 mg oral capsule: 1 tab(s) orally 2 times a day (20 May 2024 14:07)  Humalog pump: 3 unit(s) per hour 24hrs/day with 3 bolus with meals.  (20 May 2024 14:07)  hydrALAZINE 100 mg oral tablet: 1 tab(s) orally 3 times a day (20 May 2024 14:05)  losartan 25 mg oral tablet: 1 tab(s) orally once a day (at bedtime) (20 May 2024 14:05)  lovastatin 40 mg oral tablet: 1 tab(s) orally once a day, pm (20 May 2024 14:07)  NIFEdipine (Eqv-Adalat CC) 30 mg oral tablet, extended release: 1 tab(s) orally 2 times a day (20 May 2024 14:05)  tacrolimus: 1.5 milligram(s) orally 2 times a  (9:30am, 9:30pm)- *strictly timed* (20 May 2024 14:03)    MEDICATIONS  (STANDING):  apixaban 5 milliGRAM(s) Oral two times a day  aspirin  chewable 81 milliGRAM(s) Oral daily  atorvastatin 10 milliGRAM(s) Oral at bedtime  azaTHIOprine 100 milliGRAM(s) Oral daily  carvedilol 12.5 milliGRAM(s) Oral every 12 hours  cloNIDine 0.2 milliGRAM(s) Oral three times a day  dextrose 10% Bolus 125 milliLiter(s) IV Bolus once  dextrose 5%. 1000 milliLiter(s) (50 mL/Hr) IV Continuous <Continuous>  dextrose 5%. 1000 milliLiter(s) (100 mL/Hr) IV Continuous <Continuous>  dextrose 50% Injectable 25 Gram(s) IV Push once  dextrose 50% Injectable 12.5 Gram(s) IV Push once  gabapentin 300 milliGRAM(s) Oral two times a day  glucagon  Injectable 1 milliGRAM(s) IntraMuscular once  hydrALAZINE 100 milliGRAM(s) Oral three times a day  insulin lispro (HumaLOG) Pump 1 Each SubCutaneous Continuous Pump  NIFEdipine XL 30 milliGRAM(s) Oral two times a day  piperacillin/tazobactam IVPB.. 3.375 Gram(s) IV Intermittent every 8 hours  sodium chloride 0.9%. 1000 milliLiter(s) (75 mL/Hr) IV Continuous <Continuous>  sodium chloride 0.9%. 1000 milliLiter(s) (100 mL/Hr) IV Continuous <Continuous>  tacrolimus 1.5 milliGRAM(s) Oral <User Schedule>  trimethoprim   80 mG/sulfamethoxazole 400 mG 1 Tablet(s) Oral daily    MEDICATIONS  (PRN):  acetaminophen     Tablet .. 650 milliGRAM(s) Oral every 6 hours PRN Temp greater or equal to 38C (100.4F), Mild Pain (1 - 3)  aluminum hydroxide/magnesium hydroxide/simethicone Suspension 30 milliLiter(s) Oral every 4 hours PRN Dyspepsia  dextrose Oral Gel 15 Gram(s) Oral once PRN Blood Glucose LESS THAN 70 milliGRAM(s)/deciliter  melatonin 3 milliGRAM(s) Oral at bedtime PRN Insomnia  ondansetron Injectable 4 milliGRAM(s) IV Push every 8 hours PRN Nausea and/or Vomiting      REVIEW OF SYSTEMS:  General: no distress  Respiratory: No cough, SOB  Cardiovascular: No CP or Palpitations	  Gastrointestinal: No nausea, Vomiting. No diarrhea  Genitourinary: No urinary complaints	  Musculoskeletal: No new rash or lesions	  all other systems negative    T(F): 98.5 (24 @ 05:24), Max: 99.6 (24 @ 21:04)  HR: 76 (24 @ 05:24) (69 - 79)  BP: 131/71 (24 @ 05:24) (105/55 - 131/71)  RR: 17 (24 @ 05:24) (17 - 18)  SpO2: 95% (24 @ 05:24) (95% - 96%)  Wt(kg): --    PHYSICAL EXAM:  General: NAD  Respiratory: b/l air entry  Cardiovascular: S1 S2  Gastrointestinal: soft  Extremities: + edema            138  |  105  |  25<H>  ----------------------------<  147<H>  4.0   |  27  |  1.60<H>    Ca    9.2      24 May 2024 07:16    TPro  6.9  /  Alb  2.6<L>  /  TBili  0.7  /  DBili  x   /  AST  15  /  ALT  13  /  AlkPhos  75                            11.7   7.86  )-----------( 175      ( 24 May 2024 07:16 )             35.9       Hemoglobin: 11.7 g/dL ( @ 07:16)  Hematocrit: 35.9 % ( @ 07:16)  Potassium: 4.0 mmol/L ( @ 07:16)  Blood Urea Nitrogen: 25 mg/dL ( @ 07:16)      Creatinine, Serum: 1.60 ( @ 07:16)  Creatinine, Serum: 1.70 ( @ 07:58)  Creatinine, Serum: 1.40 ( @ 08:35)      Urinalysis Basic - ( 24 May 2024 07:16 )    Color: x / Appearance: x / SG: x / pH: x  Gluc: 147 mg/dL / Ketone: x  / Bili: x / Urobili: x   Blood: x / Protein: x / Nitrite: x   Leuk Esterase: x / RBC: x / WBC x   Sq Epi: x / Non Sq Epi: x / Bacteria: x      LIVER FUNCTIONS - ( 24 May 2024 07:16 )  Alb: 2.6 g/dL / Pro: 6.9 g/dL / ALK PHOS: 75 U/L / ALT: 13 U/L / AST: 15 U/L / GGT: x                       I&O's Detail    23 May 2024 07:01  -  24 May 2024 07:00  --------------------------------------------------------  IN:  Total IN: 0 mL    OUT:    Voided (mL): 900 mL  Total OUT: 900 mL    Total NET: -900 mL            Culture - Tissue with Gram Stain (collected 21 May 2024 11:30)  Source: .Tissue Other, RIGHT FOOT BONE CULTURE  Gram Stain (22 May 2024 02:54):    No polymorphonuclear cells seen per low power field    No organisms seen per oil power field  Preliminary Report (22 May 2024 20:13):    No growth    Culture - Other (collected 20 May 2024 20:00)  Source: Wound Wound  Final Report (23 May 2024 15:53):    Normal skin fady isolated    Culture - Blood (collected 20 May 2024 10:10)  Source: .Blood Blood-Peripheral  Preliminary Report (23 May 2024 16:01):    No growth at 72 Hours    Culture - Blood (collected 20 May 2024 10:05)  Source: .Blood Blood-Peripheral  Preliminary Report (23 May 2024 16:01):    No growth at 72 Hours        < from: Xray Chest 1 View-PORTABLE IMMEDIATE (24 @ 09:50) >    ACC: [id number] EXAM:  XR CHEST PORTABLE IMMED 1V   ORDERED BY: MASON BRIZUELA     PROCEDURE DATE:  2024          INTERPRETATION:  AP chest on May 20, 2024 at 9:45 AM. Patient is short of   breath with cough and fever.    Elevated diaphragms again noted. Heart normal for projection.    Lungs are clear and chest is similar to 2023.    IMPRESSION: No acute finding or change.    --- End of Report ---            YURIY JONES MD; Attending Radiologist  This document has been electronically signed. May 20 2024 10:38AM    < end of copied text >

## 2024-05-24 NOTE — PROGRESS NOTE ADULT - PROBLEM SELECTOR PLAN 3
Type 1 Diabetes w/ Insulin Pump and CGM. Insulin pump is replenished every 3 days. Follows Ramo Russell.   - Insulin pump last changed Sunday 5/19 --> needs to be replenished Tuesday night 5/21  - Insulin pump gives continuous 3u Insulin every hour  - Gets additional 5-10u insulin premeal   - Monitor fingersticks   - Consistent carb diet  - Hypoglycemia protocol   -  A1c 6.6  - Continue home Gabapentin 300mg BID for diabetic neuropathy   - Rmao consulted/Pat Weil Consulted also,

## 2024-05-24 NOTE — PROGRESS NOTE ADULT - ASSESSMENT
59yo M with a PMH of type 1 diabetes mellitus, HTN, HLD, CVA (2015), prior DVT (s/p Eliquis ~ 9 years ago), recurrent right pleural effusion with VATS, renal transplant (2013), sent in from wound care for a non healing right foot wound.     Cardiac optimization, New onset Afib   - presented from wound care center for non healing right foot wound, s/p right foot exostectomy with Dr. Catherine (5/21/24) tolerated well from CV POV, pending path/cx   - on abx per ID following   - EKG : Afib 60 bpm, no acute ischemia noted, new from prior read  - no anginal complaints  - continue ASA and statin     - New onset Afib, unknown duration  - CHADVasc: 5   - s/p heparin gtt; switched to Eliquis 5/22; tolerating well. Continue Eliquis 5mg BID  - telemetry: Afib can dc tele      - BP stable   - continue home anti hypertensives as tolerated by blood pressure: Coreg, Clonidine, hydralazine, losartan, nifedipine     - CXR: neg for acute process  - TTE: (2022) normal EF 65 %  - TTE (5/2024) normaL EF 58 %   - has chronic b/l LE edema, otherwise no sign of volume overload on exam    - follows with Dr. Kelly (o/p cardiologist)   - Will continue to follow. 59yo M with a PMH of type 1 diabetes mellitus, HTN, HLD, CVA (2015), prior DVT (s/p Eliquis ~ 9 years ago), recurrent right pleural effusion with VATS, renal transplant (2013), sent in from wound care for a non healing right foot wound.     Cardiac optimization, New onset Afib   - presented from wound care center for non healing right foot wound, s/p right foot exostectomy with Dr. Catherine (5/21/24) tolerated well from CV POV, pending path/cx   - on abx per ID following   - EKG : Afib 60 bpm, no acute ischemia noted, new from prior read  - no anginal complaints  - continue ASA and statin     - New onset Afib, unknown duration  - CHADVasc: 5   - s/p heparin gtt; switched to Eliquis 5/22; tolerating well. Continue Eliquis 5mg BID  - telemetry: Afib controlled  can dc tele      - BP stable   - continue home anti hypertensives as tolerated by blood pressure: Coreg, Clonidine, hydralazine, losartan, nifedipine     - CXR: neg for acute process  - TTE: (2022) normal EF 65 %  - TTE (5/2024) normaL EF 58 %   - has chronic b/l LE edema, otherwise no sign of volume overload on exam    - follows with Dr. Kelly (o/p cardiologist)   - Will continue to follow.

## 2024-05-24 NOTE — PROGRESS NOTE ADULT - PROBLEM SELECTOR PLAN 1
Patient seen and evaluated  Discussed condition with patient  Patient is s/p right foot exostectomy  Dressing changed at this time  Surgical pathology report reviewed with patient  Rec cont abx per ID recs  wB as tolerated in surgical shoe  Rec cont elevate right lower extremity  Pt stable from podiatry  Wound Care Instructions:  -Keep dressing clean, dry, and intact to the right foot  -Patient weightbearing as tolerated in surgical shoe   -Monitor for any signs of infection including redness, swelling in the leg above the bandage, nausea/vomiting/fever/chills/chest pain/shortness of breath, if any are present patient must report to the emergency department immediately  -Patient is to follow up with Dr. Araiza/Dr. Catherine/ Dr. Campbell  within 5 days after discharge at [facility] [place]. Please call to make an appointment [phone].

## 2024-05-24 NOTE — CONSULT NOTE ADULT - CONSULT REQUESTED DATE/TIME
24-May-2024 11:07
20-May-2024 14:00
20-May-2024 16:20
20-May-2024 13:00
20-May-2024 15:39
20-May-2024 15:26
23-May-2024 11:20

## 2024-05-24 NOTE — PROGRESS NOTE ADULT - SUBJECTIVE AND OBJECTIVE BOX
Patient is a 58y old  Male who presents with a chief complaint of Right foot wound (24 May 2024 08:21)       INTERVAL HPI/OVERNIGHT EVENTS: Patient seen and examined at bedside. No new events/complaints noted. Denies chest pain, palpitations/sob/n/v/d    MEDICATIONS  (STANDING):  apixaban 5 milliGRAM(s) Oral two times a day  aspirin  chewable 81 milliGRAM(s) Oral daily  atorvastatin 10 milliGRAM(s) Oral at bedtime  azaTHIOprine 100 milliGRAM(s) Oral daily  carvedilol 12.5 milliGRAM(s) Oral every 12 hours  cloNIDine 0.2 milliGRAM(s) Oral three times a day  dextrose 10% Bolus 125 milliLiter(s) IV Bolus once  dextrose 5%. 1000 milliLiter(s) (50 mL/Hr) IV Continuous <Continuous>  dextrose 5%. 1000 milliLiter(s) (100 mL/Hr) IV Continuous <Continuous>  dextrose 50% Injectable 25 Gram(s) IV Push once  dextrose 50% Injectable 12.5 Gram(s) IV Push once  gabapentin 300 milliGRAM(s) Oral two times a day  glucagon  Injectable 1 milliGRAM(s) IntraMuscular once  hydrALAZINE 100 milliGRAM(s) Oral three times a day  insulin lispro (HumaLOG) Pump 1 Each SubCutaneous Continuous Pump  NIFEdipine XL 30 milliGRAM(s) Oral two times a day  piperacillin/tazobactam IVPB.. 3.375 Gram(s) IV Intermittent every 8 hours  sodium chloride 0.9%. 1000 milliLiter(s) (75 mL/Hr) IV Continuous <Continuous>  sodium chloride 0.9%. 1000 milliLiter(s) (100 mL/Hr) IV Continuous <Continuous>  tacrolimus 1.5 milliGRAM(s) Oral <User Schedule>  trimethoprim   80 mG/sulfamethoxazole 400 mG 1 Tablet(s) Oral daily    MEDICATIONS  (PRN):  acetaminophen     Tablet .. 650 milliGRAM(s) Oral every 6 hours PRN Temp greater or equal to 38C (100.4F), Mild Pain (1 - 3)  aluminum hydroxide/magnesium hydroxide/simethicone Suspension 30 milliLiter(s) Oral every 4 hours PRN Dyspepsia  dextrose Oral Gel 15 Gram(s) Oral once PRN Blood Glucose LESS THAN 70 milliGRAM(s)/deciliter  melatonin 3 milliGRAM(s) Oral at bedtime PRN Insomnia  ondansetron Injectable 4 milliGRAM(s) IV Push every 8 hours PRN Nausea and/or Vomiting      Allergies    No Known Allergies    Intolerances        REVIEW OF SYSTEMS:  Per HPI. All other ROS noted Negative   Vital Signs Last 24 Hrs  T(C): 36.9 (24 May 2024 05:24), Max: 37.6 (23 May 2024 21:04)  T(F): 98.5 (24 May 2024 05:24), Max: 99.6 (23 May 2024 21:04)  HR: 76 (24 May 2024 05:24) (69 - 79)  BP: 131/71 (24 May 2024 05:24) (105/55 - 131/71)  BP(mean): --  RR: 17 (24 May 2024 05:24) (17 - 18)  SpO2: 95% (24 May 2024 05:24) (95% - 96%)    Parameters below as of 24 May 2024 05:24  Patient On (Oxygen Delivery Method): room air        PHYSICAL EXAM:  HEENT:  anicteric, moist mucous membranes  CHEST/LUNG:  CTA b/l, no rales, wheezes, or rhonchi  HEART:  S1, S2 , irregular/ no tachy    ABDOMEN:  BS+, soft, nontender, nondistended  EXTREMITIES: + right foot dressed, clean dry   NERVOUS SYSTEM: answers questions and follows commands appropriately  Skin: warm, dry, no rash     LABS:      Ca    9.1        23 May 2024 07:58      PTT - ( 23 May 2024 07:58 )  PTT:32.6 sec  CAPILLARY BLOOD GLUCOSE      POCT Blood Glucose.: 146 mg/dL (24 May 2024 08:22)  POCT Blood Glucose.: 130 mg/dL (23 May 2024 21:27)  POCT Blood Glucose.: 111 mg/dL (23 May 2024 12:16)    BLOOD CULTURE  05-21 @ 11:30   No growth  --  --  05-20 @ 20:00   Normal skin fady isolated  --  --  05-20 @ 10:10   No growth at 72 Hours  --  --  05-20 @ 10:05   No growth at 72 Hours  --  --    RADIOLOGY & ADDITIONAL TESTS:    Imaging Personally Reviewed:  [ ] YES     Consultant(s) Notes Reviewed:      Care Discussed with Consultants/Other Providers:

## 2024-05-24 NOTE — PROGRESS NOTE ADULT - PROBLEM SELECTOR PLAN 5
Chronic   - /74 on admission  - Continue home Carvedilol 12.5mg BID   - Continue home Clonidine 0.2mg TID  - Continue home Nifedipine 30mg BID  - Continue home Hydralazine 100mg TID  - Losartan held due to MAYRA

## 2024-05-24 NOTE — PROGRESS NOTE ADULT - PROBLEM SELECTOR PLAN 4
Patient is s/p right renal transplant in 2013. Baseline Cr around 1.2. Follows Nephro Dr. Chi and Dr. Stone   - MAYRA noted. started on IVF fluids as per Nephro.   - Continue home Azathioprine 100mg daily   - Continue home Tacrolimus 1.5 daily at 9:30 and 21:30  - Continue home Bactrim for prophylaxis   - Avoid nephrotoxic meds   - Monitor daily CMP
Patient is s/p right renal transplant in 2013. Baseline Cr around 1.2. Follows Nephro Dr. Chi and Dr. Stone   - BUN/Cr 25/1.2  - Continue home Azathioprine 100mg daily   - Continue home Tacrolimus 1.5 daily at 9:30 and 21:30  - Continue home Bactrim for prophylaxis   - Avoid nephrotoxic meds   - Monitor daily CMP

## 2024-05-24 NOTE — PROGRESS NOTE ADULT - PROBLEM SELECTOR PLAN 7
Chronic   - Continue therapeutic interchange for home Lovastatin at bedtime   - F/u lipid panel
Chronic   - Continue therapeutic interchange for home Lovastatin at bedtime

## 2024-05-24 NOTE — CONSULT NOTE ADULT - ASSESSMENT
MAYRA on CKD 3, h/o Kidney transplant 2013 LRD  Diabetes Type 1, Diabetic foot ulcer, Right foot wound, s/p surgery  Hypertension    Renal indices improving. Will continue IV hydration. Check tacrolimus levels. Hold ARB. Encourage PO intake as tolerated. To continue current meds.   Continue prograf and azathioprine. Monitor blood sugar levels. Insulin coverage as needed. Monitor BP trend. Titrate BP meds as needed.   Podiatry follow up. Will follow electrolytes and renal function trend.     Further recommendations pending clinical course. Thank you for the courtesy of this referral.

## 2024-05-24 NOTE — DISCHARGE NOTE NURSING/CASE MANAGEMENT/SOCIAL WORK - NSPROEXTENSIONSOFSELF_GEN_A_NUR
----- Message from Adriel Yanez sent at 3/4/2022  4:22 PM CST -----  Regarding: PROVIDER  Contact: Self  Pt stated that she already have a neurologist, do not need an appointment           none

## 2024-05-24 NOTE — CASE MANAGEMENT PROGRESS NOTE - NSCMPROGRESSNOTE_GEN_ALL_CORE
Pt for DC on Saturday if Creatinine better. No ARGENIS needed path came back and pt will go home on po antibiotics. Pt is s/p R foot 5th metatarsal resection. Pt is WBAT in a surgical shoe. Pt is recommended for home PT and a script will be given upon discharge. Choice were given to the pt on the DC paperwork and pt verbalized understanding. Wife or son will transport home. Pt has RW WC and knee scooter in the home.

## 2024-05-24 NOTE — DISCHARGE NOTE NURSING/CASE MANAGEMENT/SOCIAL WORK - NSDCCRNAME_GEN_ALL_CORE_FT
You have been recommended for home PT services.  The MD will provide a prescription for home PT, call one of the vendors listed ( or one of your choosing in your area) with your insurance information.  They will also need the original prescription.   Rehab at home [phone]  [facility] [phone]  [facility] [phone]  [facility] [phone]  [facility] [phone]

## 2024-05-24 NOTE — PROGRESS NOTE ADULT - SUBJECTIVE AND OBJECTIVE BOX
[facility] -- Luly Egan Pannella, Patel, Savella Goodger, Cohen  Office # [phone]      Follow Up:  Cardiac optimization     Subjective/Observations:     No events overnight resting comfortably in bed.  No complaints of chest pain, dyspnea, or palpitations reported. No signs of orthopnea or PND.    REVIEW OF SYSTEMS: All other review of systems is negative unless indicated above    PAST MEDICAL & SURGICAL HISTORY:  Hypertension      Hyperlipidemia      Diabetes mellitus  Type 1 on insulin pump      CKD (chronic kidney disease)  Renal Transplant  at [facility]      Diabetic peripheral neuropathy      Obesity (BMI 30-39.9)      CVA (cerebral vascular accident)  Wallenberg Stroke  low L body sensation  low R face sensation  decreased peripheral vision  poor balance          Squamous cell carcinoma of skin of left ear  nose      History of DVT (deep vein thrombosis)  LLE , was on Eliquis x 6 months  Was hospitalized for Rhinovirus at the time, intubated      Recurrent squamous cell carcinoma of skin  nose, L arm      History of pleural effusion  right thoracentesis 2022 and 2022      CARMEN treated with BiPAP  2L O2 at night      History of restrictive lung disease      Toe infection   L 4th Toe surgery-amputation secondary to osteomyelitis   partial right 2nd 4th toe amputation      Ear disease  Left inner ear surgery, 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, Can NOT have MRI      Vasectomy status  s/p b/l  vasectomy      H/O foot surgery   - right foot - bone fracture - s/p ORIF and subsequent removal of hardware      End-stage renal failure with renal transplant  2013      S/P kidney transplant        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 1000 milliLiter(s) (50 mL/Hr) IV Continuous <Continuous>  dextrose 5%. 1000 milliLiter(s) (100 mL/Hr) IV Continuous <Continuous>  dextrose 50% Injectable 12.5 Gram(s) IV Push once  dextrose 50% Injectable 25 Gram(s) IV Push once  gabapentin 300 milliGRAM(s) Oral two times a day  glucagon  Injectable 1 milliGRAM(s) IntraMuscular once  hydrALAZINE 100 milliGRAM(s) Oral three times a day  insulin lispro (HumaLOG) Pump 1 Each SubCutaneous Continuous Pump  NIFEdipine XL 30 milliGRAM(s) Oral two times a day  piperacillin/tazobactam IVPB.. 3.375 Gram(s) IV Intermittent every 8 hours  sodium chloride 0.9%. 1000 milliLiter(s) (75 mL/Hr) IV Continuous <Continuous>  sodium chloride 0.9%. 1000 milliLiter(s) (100 mL/Hr) IV Continuous <Continuous>  tacrolimus 1.5 milliGRAM(s) Oral <User Schedule>  trimethoprim   80 mG/sulfamethoxazole 400 mG 1 Tablet(s) Oral daily    MEDICATIONS  (PRN):  acetaminophen     Tablet .. 650 milliGRAM(s) Oral every 6 hours PRN Temp greater or equal to 38C (100.4F), Mild Pain (1 - 3)  aluminum hydroxide/magnesium hydroxide/simethicone Suspension 30 milliLiter(s) Oral every 4 hours PRN Dyspepsia  dextrose Oral Gel 15 Gram(s) Oral once PRN Blood Glucose LESS THAN 70 milliGRAM(s)/deciliter  melatonin 3 milliGRAM(s) Oral at bedtime PRN Insomnia  ondansetron Injectable 4 milliGRAM(s) IV Push every 8 hours PRN Nausea and/or Vomiting      Allergies    No Known Allergies    Intolerances        Vital Signs Last 24 Hrs  T(C): 36.9 (24 May 2024 05:24), Max: 37.6 (23 May 2024 21:04)  T(F): 98.5 (24 May 2024 05:24), Max: 99.6 (23 May 2024 21:04)  HR: 76 (24 May 2024 05:24) (69 - 79)  BP: 131/71 (24 May 2024 05:24) (105/55 - 131/71)  BP(mean): --  RR: 17 (24 May 2024 05:24) (17 - 18)  SpO2: 95% (24 May 2024 05:24) (95% - 96%)    Parameters below as of 24 May 2024 05:24  Patient On (Oxygen Delivery Method): room air        I&O's Summary    23 May 2024 07:01  -  24 May 2024 07:00  --------------------------------------------------------  IN: 0 mL / OUT: 900 mL / NET: -900 mL          PHYSICAL EXAM:  TELE: Af   Constitutional: NAD, awake and alert, well-developed  HEENT: Moist Mucous Membranes, Anicteric  Pulmonary: Non-labored, breath sounds are clear bilaterally, No wheezing, crackles or rhonchi  Cardiovascular: IRRegular, S1 and S2 nl, No murmurs, rubs, gallops or clicks  Gastrointestinal: Bowel Sounds present, soft, nontender.   Lymph: No lymphadenopathy. No peripheral edema.  Skin: No visible rashes or ulcers.  Psych:  Mood & affect appropriate    LABS: All Labs Reviewed:                        12.0   8.18  )-----------( 190      ( 23 May 2024 07:58 )             36.2                         12.9   8.44  )-----------( 181      ( 22 May 2024 08:35 )             39.1                         13.3   9.43  )-----------( 197      ( 21 May 2024 20:26 )             39.4     23 May 2024 07:58    134    |  101    |  25     ----------------------------<  165    3.6     |  29     |  1.70   22 May 2024 08:35    139    |  104    |  19     ----------------------------<  107    3.9     |  31     |  1.40     Ca    9.1        23 May 2024 07:58  Ca    8.8        22 May 2024 08:35  Phos  3.6       22 May 2024 08:35  Mg     2.0       22 May 2024 08:35      PTT - ( 23 May 2024 07:58 )  PTT:32.6 sec         EC Lead ECG:   Ventricular Rate 70 BPM    Atrial Rate 110 BPM    QRS Duration 100 ms    Q-T Interval 400 ms    QTC Calculation(Bazett) 432 ms    R Axis -4 degrees    T Axis 19 degrees    Diagnosis Line Atrial fibrillation  Abnormal ECG  When compared with ECG of 20-MAY-2024 10:00,  No significant change was found  Confirmed by Marko Mauricio MD ([id number]) on 2024 12:46:31 PM (24 @ 09:12)      TRANSTHORACIC ECHOCARDIOGRAM REPORT  ________________________________________________________________________________                                      _______       Pt. Name:       LUTHER NORIEGA Study Date:    2024  MRN:            [id number]            YOB: 1965  Accession #:    [id number]           Age:           58 years  Account#:       [id number]          Gender:        M  Visit ID#  Heart Rate:                         Height:        75.98 in (193.00 cm)  Rhythm:                            Weight:        328.48 lb (149.00 kg)  Blood Pressure: 117/71 mmHg         BSA/BMI:       2.74 m² / 40.00 kg/m²  ________________________________________________________________________________________  Referring Physician:   [id number] Kristian Castro  Interpreting Physician: Marko Mauricio  Primary Sonographer:    Ferdinand Pereira    CPT:               ECHO TTE WO CON COMP W DOPP - 47539.m  Indication(s):     Abnormal electrocardiogram ECG/EKG - R94.31  Procedure:         Transthoracic echocardiogram with 2-D, M-mode and complete                     spectral and color flow Doppler.  Ordering Location: [facility]  Admission Status:  ED  Study Information: Image quality for this study is technically difficult.    _______________________________________________________________________________________     CONCLUSIONS:      1. Technically difficult image quality.   2. Left ventricular systolic function is normal with an ejection fraction of 58 % by Garay's method of disks.   3. The right ventricle is not well visualized. probably normal systolic function.   4. The left atrium is normal in size.   5. The right atrium is normal in size.   6. Tricuspid aortic valve with normal leaflet excursion. There is focal calcification of the aortic valve leaflets.   7. There is mild calcification of the mitral valve annulus.   8. Trace mitral regurgitation.   9. Trace tricuspid regurgitation.  10. The inferior vena cava is normal in size measuring 1.89 cm in diameter, (normal <2.1cm) with normal inspiratory collapse (normal >50%) consistent with normal right atrial pressure (~3, range 0-5mmHg).    ________________________________________________________________________________________  FINDINGS:     Left Ventricle:  Left ventricular systolic function is normal with a calculated ejection fraction of 58 % by the Garay's biplane method of disks. The left ventricular diastolic function is indeterminate.     Right Ventricle:  The right ventricle is not well visualized. Probably normal systolic function. Tricuspid annular plane systolic excursion (TAPSE) is 1.6 cm (normal >=1.7 cm).     Left Atrium:  The left atrium is normal in size with an indexed volume of 23.65 ml/m².     Right Atrium:  The right atrium is normal in size.     Aortic Valve:  The aortic valve is tricuspid with normal leaflet excursion. There is focal calcification of the aortic valve leaflets.     Mitral Valve:  There is mild calcification of the mitral valve annulus. There is trace mitral regurgitation.     Tricuspid Valve:  There is trace tricuspid regurgitation.     Pulmonic Valve:  The pulmonic valve was not well visualized. There is trace pulmonic regurgitation.     Aorta:  The aortic root at the sinuses of Valsalva is normal in size, measuring 3.20 cm (indexed 1.17 cm/m²). The aortic arch diameter is normal in size, measuring 2.8 cm (indexed 1.02 cm/m²).     Systemic Veins:  The inferior vena cava is normal in size measuring 1.89 cm in diameter, (normal <2.1cm) with normal inspiratory collapse (normal >50%) consistent with normal right atrial pressure (~3, range 0-5mmHg).  ____________________________________________________________________  QUANTITATIVE DATA:  Left Ventricle Measurements: (Indexed to BSA)     IVSd (2D):   1.2 cm  LVPWd (2D):  1.1 cm  LVIDd (2D):  4.9 cm  LVIDs (2D):  3.1 cm  LV Mass:     222 g  81.0 g/m²  LV Vol d, MOD A2C: 68.5 ml 25.04 ml/m²  LV Vol d, MOD A4C: 78.7 ml 28.77 ml/m²  LV Vol d, MOD BP:  75.3 ml 27.52 ml/m²  LV Vol s, MOD A2C: 23.4 ml 8.55 ml/m²  LV Vol s, MOD A4C: 34.1 ml 12.47 ml/m²  LV Vol s, MOD BP:  31.5 ml 11.50 ml/m²  LVOT SV MOD BP:    43.8 ml  LV EF% MOD BP:     58 %     MV E Vmax:    1.18 m/s  MV A Vmax:    0.35 m/s  MV E/A:       3.41  e' lateral:   10.90 cm/s  e' medial:    11.00cm/s  E/e' lateral: 10.83  E/e' medial:  10.73  E/e' Average: 10.78  MV DT:        219 msec    Aorta Measurements: (Normal range) (Indexed to BSA)     Sinuses of Valsalva: 3.20 cm (3.1 - 3.7 cm)  Ao Arch:             2.8 cm       Left Atrium Measurements: (Indexed to BSA)  LA Diam 2D: 4.10 cm    Right Ventricle Measurements:     TAPSE:            1.6 cm  RV Base (RVID1):  3.8 cm  RV Mid (RVID2):   3.3 cm  RV Major (RVID3): 6.7 cm       LVOT / RVOT/ Qp/Qs Data: (Indexed to BSA)  LVOT Diameter: 2.20 cm  LVOT Area:     3.80 cm²    Mitral Valve Measurements:     MV E Vmax: 1.2 m/s  MV A Vmax: 0.3 m/s  MV E/A:    3.4       Tricuspid Valve Measurements:     RA Pressure: 3 mmHg    ________________________________________________________________________________________  Electronically signed on 2024 at 5:52:45 PM by Marko Mauricio         *** Final ***      Radiology:         [facility] -- Luly Egan Pannella, Patel, Savella Goodger, Cohen  Office # [phone]      Follow Up:  Cardiac optimization     Subjective/Observations:     No events overnight resting comfortably in chair .  No complaints of chest pain, dyspnea, or palpitations reported. No signs of orthopnea or PND.    REVIEW OF SYSTEMS: All other review of systems is negative unless indicated above    PAST MEDICAL & SURGICAL HISTORY:  Hypertension      Hyperlipidemia      Diabetes mellitus  Type 1 on insulin pump      CKD (chronic kidney disease)  Renal Transplant  at [facility]      Diabetic peripheral neuropathy      Obesity (BMI 30-39.9)      CVA (cerebral vascular accident)  Wallenberg Stroke  low L body sensation  low R face sensation  decreased peripheral vision  poor balance          Squamous cell carcinoma of skin of left ear  nose      History of DVT (deep vein thrombosis)  LLE , was on Eliquis x 6 months  Was hospitalized for Rhinovirus at the time, intubated      Recurrent squamous cell carcinoma of skin  nose, L arm      History of pleural effusion  right thoracentesis 2022 and 2022      CARMEN treated with BiPAP  2L O2 at night      History of restrictive lung disease      Toe infection   L 4th Toe surgery-amputation secondary to osteomyelitis   partial right 2nd 4th toe amputation      Ear disease  Left inner ear surgery, 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, Can NOT have MRI      Vasectomy status  s/p b/l  vasectomy      H/O foot surgery   - right foot - bone fracture - s/p ORIF and subsequent removal of hardware      End-stage renal failure with renal transplant  2013      S/P kidney transplant        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 1000 milliLiter(s) (50 mL/Hr) IV Continuous <Continuous>  dextrose 5%. 1000 milliLiter(s) (100 mL/Hr) IV Continuous <Continuous>  dextrose 50% Injectable 12.5 Gram(s) IV Push once  dextrose 50% Injectable 25 Gram(s) IV Push once  gabapentin 300 milliGRAM(s) Oral two times a day  glucagon  Injectable 1 milliGRAM(s) IntraMuscular once  hydrALAZINE 100 milliGRAM(s) Oral three times a day  insulin lispro (HumaLOG) Pump 1 Each SubCutaneous Continuous Pump  NIFEdipine XL 30 milliGRAM(s) Oral two times a day  piperacillin/tazobactam IVPB.. 3.375 Gram(s) IV Intermittent every 8 hours  sodium chloride 0.9%. 1000 milliLiter(s) (75 mL/Hr) IV Continuous <Continuous>  sodium chloride 0.9%. 1000 milliLiter(s) (100 mL/Hr) IV Continuous <Continuous>  tacrolimus 1.5 milliGRAM(s) Oral <User Schedule>  trimethoprim   80 mG/sulfamethoxazole 400 mG 1 Tablet(s) Oral daily    MEDICATIONS  (PRN):  acetaminophen     Tablet .. 650 milliGRAM(s) Oral every 6 hours PRN Temp greater or equal to 38C (100.4F), Mild Pain (1 - 3)  aluminum hydroxide/magnesium hydroxide/simethicone Suspension 30 milliLiter(s) Oral every 4 hours PRN Dyspepsia  dextrose Oral Gel 15 Gram(s) Oral once PRN Blood Glucose LESS THAN 70 milliGRAM(s)/deciliter  melatonin 3 milliGRAM(s) Oral at bedtime PRN Insomnia  ondansetron Injectable 4 milliGRAM(s) IV Push every 8 hours PRN Nausea and/or Vomiting      Allergies    No Known Allergies    Intolerances        Vital Signs Last 24 Hrs  T(C): 36.9 (24 May 2024 05:24), Max: 37.6 (23 May 2024 21:04)  T(F): 98.5 (24 May 2024 05:24), Max: 99.6 (23 May 2024 21:04)  HR: 76 (24 May 2024 05:24) (69 - 79)  BP: 131/71 (24 May 2024 05:24) (105/55 - 131/71)  BP(mean): --  RR: 17 (24 May 2024 05:24) (17 - 18)  SpO2: 95% (24 May 2024 05:24) (95% - 96%)    Parameters below as of 24 May 2024 05:24  Patient On (Oxygen Delivery Method): room air        I&O's Summary    23 May 2024 07:01  -  24 May 2024 07:00  --------------------------------------------------------  IN: 0 mL / OUT: 900 mL / NET: -900 mL          PHYSICAL EXAM:  TELE: Af   Constitutional: NAD, awake and alertobese   HEENT: Moist Mucous Membranes, Anicteric  Pulmonary: Non-labored, breath sounds are clear bilaterally, No wheezing, crackles or rhonchi  Cardiovascular: IRRegular, S1 and S2 nl, No murmurs, rubs, gallops or clicks  Gastrointestinal: Bowel Sounds present, soft, nontender.   Lymph: No lymphadenopathy. No peripheral edema.  Skin: No visible rashes or ulcers.  Psych:  Mood & affect appropriate    LABS: All Labs Reviewed:                        12.0   8.18  )-----------( 190      ( 23 May 2024 07:58 )             36.2                         12.9   8.44  )-----------( 181      ( 22 May 2024 08:35 )             39.1                         13.3   9.43  )-----------( 197      ( 21 May 2024 20:26 )             39.4     23 May 2024 07:58    134    |  101    |  25     ----------------------------<  165    3.6     |  29     |  1.70   22 May 2024 08:35    139    |  104    |  19     ----------------------------<  107    3.9     |  31     |  1.40     Ca    9.1        23 May 2024 07:58  Ca    8.8        22 May 2024 08:35  Phos  3.6       22 May 2024 08:35  Mg     2.0       22 May 2024 08:35      PTT - ( 23 May 2024 07:58 )  PTT:32.6 sec         EC Lead ECG:   Ventricular Rate 70 BPM    Atrial Rate 110 BPM    QRS Duration 100 ms    Q-T Interval 400 ms    QTC Calculation(Bazett) 432 ms    R Axis -4 degrees    T Axis 19 degrees    Diagnosis Line Atrial fibrillation  Abnormal ECG  When compared with ECG of 20-MAY-2024 10:00,  No significant change was found  Confirmed by Marko Mauricio MD ([id number]) on 2024 12:46:31 PM (24 @ 09:12)      TRANSTHORACIC ECHOCARDIOGRAM REPORT  ________________________________________________________________________________                                      _______       Pt. Name:       LUTHER NORIEGA Study Date:    2024  MRN:            [id number]            YOB: 1965  Accession #:    [id number]           Age:           58 years  Account#:       [id number]          Gender:        M  Visit ID#  Heart Rate:                         Height:        75.98 in (193.00 cm)  Rhythm:                            Weight:        328.48 lb (149.00 kg)  Blood Pressure: 117/71 mmHg         BSA/BMI:       2.74 m² / 40.00 kg/m²  ________________________________________________________________________________________  Referring Physician:   [id number] Kristian Castro  Interpreting Physician: Marko Mauricio  Primary Sonographer:    Ferdinand Pereira    CPT:               ECHO TTE WO CON COMP W DOPP - 71946.m  Indication(s):     Abnormal electrocardiogram ECG/EKG - R94.31  Procedure:         Transthoracic echocardiogram with 2-D, M-mode and complete                     spectral and color flow Doppler.  Ordering Location: [facility]  Admission Status:  ED  Study Information: Image quality for this study is technically difficult.    _______________________________________________________________________________________     CONCLUSIONS:      1. Technically difficult image quality.   2. Left ventricular systolic function is normal with an ejection fraction of 58 % by Garay's method of disks.   3. The right ventricle is not well visualized. probably normal systolic function.   4. The left atrium is normal in size.   5. The right atrium is normal in size.   6. Tricuspid aortic valve with normal leaflet excursion. There is focal calcification of the aortic valve leaflets.   7. There is mild calcification of the mitral valve annulus.   8. Trace mitral regurgitation.   9. Trace tricuspid regurgitation.  10. The inferior vena cava is normal in size measuring 1.89 cm in diameter, (normal <2.1cm) with normal inspiratory collapse (normal >50%) consistent with normal right atrial pressure (~3, range 0-5mmHg).    ________________________________________________________________________________________  FINDINGS:     Left Ventricle:  Left ventricular systolic function is normal with a calculated ejection fraction of 58 % by the Garay's biplane method of disks. The left ventricular diastolic function is indeterminate.     Right Ventricle:  The right ventricle is not well visualized. Probably normal systolic function. Tricuspid annular plane systolic excursion (TAPSE) is 1.6 cm (normal >=1.7 cm).     Left Atrium:  The left atrium is normal in size with an indexed volume of 23.65 ml/m².     Right Atrium:  The right atrium is normal in size.     Aortic Valve:  The aortic valve is tricuspid with normal leaflet excursion. There is focal calcification of the aortic valve leaflets.     Mitral Valve:  There is mild calcification of the mitral valve annulus. There is trace mitral regurgitation.     Tricuspid Valve:  There is trace tricuspid regurgitation.     Pulmonic Valve:  The pulmonic valve was not well visualized. There is trace pulmonic regurgitation.     Aorta:  The aortic root at the sinuses of Valsalva is normal in size, measuring 3.20 cm (indexed 1.17 cm/m²). The aortic arch diameter is normal in size, measuring 2.8 cm (indexed 1.02 cm/m²).     Systemic Veins:  The inferior vena cava is normal in size measuring 1.89 cm in diameter, (normal <2.1cm) with normal inspiratory collapse (normal >50%) consistent with normal right atrial pressure (~3, range 0-5mmHg).  ____________________________________________________________________  QUANTITATIVE DATA:  Left Ventricle Measurements: (Indexed to BSA)     IVSd (2D):   1.2 cm  LVPWd (2D):  1.1 cm  LVIDd (2D):  4.9 cm  LVIDs (2D):  3.1 cm  LV Mass:     222 g  81.0 g/m²  LV Vol d, MOD A2C: 68.5 ml 25.04 ml/m²  LV Vol d, MOD A4C: 78.7 ml 28.77 ml/m²  LV Vol d, MOD BP:  75.3 ml 27.52 ml/m²  LV Vol s, MOD A2C: 23.4 ml 8.55 ml/m²  LV Vol s, MOD A4C: 34.1 ml 12.47 ml/m²  LV Vol s, MOD BP:  31.5 ml 11.50 ml/m²  LVOT SV MOD BP:    43.8 ml  LV EF% MOD BP:     58 %     MV E Vmax:    1.18 m/s  MV A Vmax:    0.35 m/s  MV E/A:       3.41  e' lateral:   10.90 cm/s  e' medial:    11.00cm/s  E/e' lateral: 10.83  E/e' medial:  10.73  E/e' Average: 10.78  MV DT:        219 msec    Aorta Measurements: (Normal range) (Indexed to BSA)     Sinuses of Valsalva: 3.20 cm (3.1 - 3.7 cm)  Ao Arch:             2.8 cm       Left Atrium Measurements: (Indexed to BSA)  LA Diam 2D: 4.10 cm    Right Ventricle Measurements:     TAPSE:            1.6 cm  RV Base (RVID1):  3.8 cm  RV Mid (RVID2):   3.3 cm  RV Major (RVID3): 6.7 cm       LVOT / RVOT/ Qp/Qs Data: (Indexed to BSA)  LVOT Diameter: 2.20 cm  LVOT Area:     3.80 cm²    Mitral Valve Measurements:     MV E Vmax: 1.2 m/s  MV A Vmax: 0.3 m/s  MV E/A:    3.4       Tricuspid Valve Measurements:     RA Pressure: 3 mmHg    ________________________________________________________________________________________  Electronically signed on 2024 at 5:52:45 PM by Marko Mauricio         *** Final ***      Radiology:

## 2024-05-24 NOTE — PROGRESS NOTE ADULT - ASSESSMENT
Patient is a 57yo M with a PMH of type 1 diabetes mellitus, HTN, HLD, CVA (2015), prior DVT (s/p Eliquis), recurrent right pleural effusion with VATS, renal transplant (2013), sent in from wound care for a non healing right foot wound.     R foot wound  - Right Foot Xray:  Status post resection of the fifth ray at the level of the distal metatarsal and proximal phalanx. Status post resection of the second and third digits. No cortical erosion or periosteal reaction. There is a plantar calcaneal enthesophyte. Strength is present within the medial navicular.  - Prior WCx E. faecalis from 6/30/22  - BCx/WCx/OR cx NGTD  - S/p Exostectomy of right foot 21-May-2024 11:57:39  Donte Catherine   -- path w/o bone; just dense fibroconnective tissue    Hx of renal txp in 2013    Recommendations:  Switch to PO augmentin 875/125mg BID to complete x7 day course until 5/27  C/w bactrim for ppx in renal txp pt  Trend temps/WBC  Appreciate podiatry recs  Additional care per primary team    Stable from ID standpoint  D/c planning per primary team    D/w Dr. Mora  D/w Dr. Catherine    Over the long weekend Dr. Bravo will be covering for our group from 5/25-5/28  If you have any questions, concerns or new micro data, please reach out by calling [phone].  I will resume care 5/28  Claire Castro M.D.  [facility] Division of Infectious Diseases [phone]  For after 5 P.M. and weekends, please call [phone]

## 2024-05-25 ENCOUNTER — TRANSCRIPTION ENCOUNTER (OUTPATIENT)
Age: 59
End: 2024-05-25

## 2024-05-25 VITALS
TEMPERATURE: 98 F | DIASTOLIC BLOOD PRESSURE: 70 MMHG | SYSTOLIC BLOOD PRESSURE: 158 MMHG | HEART RATE: 74 BPM | OXYGEN SATURATION: 93 % | RESPIRATION RATE: 17 BRPM

## 2024-05-25 LAB
-  CLINDAMYCIN: SIGNIFICANT CHANGE UP
-  ERYTHROMYCIN: SIGNIFICANT CHANGE UP
-  GENTAMICIN: SIGNIFICANT CHANGE UP
-  OXACILLIN: SIGNIFICANT CHANGE UP
-  PENICILLIN: SIGNIFICANT CHANGE UP
-  RIFAMPIN: SIGNIFICANT CHANGE UP
-  TETRACYCLINE: SIGNIFICANT CHANGE UP
-  TRIMETHOPRIM/SULFAMETHOXAZOLE: SIGNIFICANT CHANGE UP
-  VANCOMYCIN: SIGNIFICANT CHANGE UP
ANION GAP SERPL CALC-SCNC: 6 MMOL/L — SIGNIFICANT CHANGE UP (ref 5–17)
BUN SERPL-MCNC: 23 MG/DL — SIGNIFICANT CHANGE UP (ref 7–23)
CALCIUM SERPL-MCNC: 8.9 MG/DL — SIGNIFICANT CHANGE UP (ref 8.5–10.1)
CHLORIDE SERPL-SCNC: 107 MMOL/L — SIGNIFICANT CHANGE UP (ref 96–108)
CO2 SERPL-SCNC: 26 MMOL/L — SIGNIFICANT CHANGE UP (ref 22–31)
CREAT SERPL-MCNC: 1.3 MG/DL — SIGNIFICANT CHANGE UP (ref 0.5–1.3)
CULTURE RESULTS: SIGNIFICANT CHANGE UP
CULTURE RESULTS: SIGNIFICANT CHANGE UP
EGFR: 64 ML/MIN/1.73M2 — SIGNIFICANT CHANGE UP
GLUCOSE SERPL-MCNC: 221 MG/DL — HIGH (ref 70–99)
HCT VFR BLD CALC: 36.5 % — LOW (ref 39–50)
HGB BLD-MCNC: 11.6 G/DL — LOW (ref 13–17)
MCHC RBC-ENTMCNC: 29.4 PG — SIGNIFICANT CHANGE UP (ref 27–34)
MCHC RBC-ENTMCNC: 31.8 GM/DL — LOW (ref 32–36)
MCV RBC AUTO: 92.4 FL — SIGNIFICANT CHANGE UP (ref 80–100)
METHOD TYPE: SIGNIFICANT CHANGE UP
NRBC # BLD: 0 /100 WBCS — SIGNIFICANT CHANGE UP (ref 0–0)
PLATELET # BLD AUTO: 195 K/UL — SIGNIFICANT CHANGE UP (ref 150–400)
POTASSIUM SERPL-MCNC: 4.3 MMOL/L — SIGNIFICANT CHANGE UP (ref 3.5–5.3)
POTASSIUM SERPL-SCNC: 4.3 MMOL/L — SIGNIFICANT CHANGE UP (ref 3.5–5.3)
RBC # BLD: 3.95 M/UL — LOW (ref 4.2–5.8)
RBC # FLD: 13.7 % — SIGNIFICANT CHANGE UP (ref 10.3–14.5)
SODIUM SERPL-SCNC: 139 MMOL/L — SIGNIFICANT CHANGE UP (ref 135–145)
SPECIMEN SOURCE: SIGNIFICANT CHANGE UP
SPECIMEN SOURCE: SIGNIFICANT CHANGE UP
WBC # BLD: 6.93 K/UL — SIGNIFICANT CHANGE UP (ref 3.8–10.5)
WBC # FLD AUTO: 6.93 K/UL — SIGNIFICANT CHANGE UP (ref 3.8–10.5)

## 2024-05-25 PROCEDURE — 88304 TISSUE EXAM BY PATHOLOGIST: CPT

## 2024-05-25 PROCEDURE — 71045 X-RAY EXAM CHEST 1 VIEW: CPT

## 2024-05-25 PROCEDURE — 87075 CULTR BACTERIA EXCEPT BLOOD: CPT

## 2024-05-25 PROCEDURE — 96374 THER/PROPH/DIAG INJ IV PUSH: CPT

## 2024-05-25 PROCEDURE — 83605 ASSAY OF LACTIC ACID: CPT

## 2024-05-25 PROCEDURE — 88311 DECALCIFY TISSUE: CPT

## 2024-05-25 PROCEDURE — 85610 PROTHROMBIN TIME: CPT

## 2024-05-25 PROCEDURE — 83036 HEMOGLOBIN GLYCOSYLATED A1C: CPT

## 2024-05-25 PROCEDURE — 86140 C-REACTIVE PROTEIN: CPT

## 2024-05-25 PROCEDURE — 84100 ASSAY OF PHOSPHORUS: CPT

## 2024-05-25 PROCEDURE — 99285 EMERGENCY DEPT VISIT HI MDM: CPT

## 2024-05-25 PROCEDURE — 73630 X-RAY EXAM OF FOOT: CPT

## 2024-05-25 PROCEDURE — 85730 THROMBOPLASTIN TIME PARTIAL: CPT

## 2024-05-25 PROCEDURE — 99239 HOSP IP/OBS DSCHRG MGMT >30: CPT

## 2024-05-25 PROCEDURE — 80053 COMPREHEN METABOLIC PANEL: CPT

## 2024-05-25 PROCEDURE — 82962 GLUCOSE BLOOD TEST: CPT

## 2024-05-25 PROCEDURE — 87186 SC STD MICRODIL/AGAR DIL: CPT

## 2024-05-25 PROCEDURE — 97161 PT EVAL LOW COMPLEX 20 MIN: CPT

## 2024-05-25 PROCEDURE — 87077 CULTURE AEROBIC IDENTIFY: CPT

## 2024-05-25 PROCEDURE — 87070 CULTURE OTHR SPECIMN AEROBIC: CPT

## 2024-05-25 PROCEDURE — 80061 LIPID PANEL: CPT

## 2024-05-25 PROCEDURE — 83735 ASSAY OF MAGNESIUM: CPT

## 2024-05-25 PROCEDURE — 93306 TTE W/DOPPLER COMPLETE: CPT

## 2024-05-25 PROCEDURE — 85652 RBC SED RATE AUTOMATED: CPT

## 2024-05-25 PROCEDURE — 36415 COLL VENOUS BLD VENIPUNCTURE: CPT

## 2024-05-25 PROCEDURE — 93005 ELECTROCARDIOGRAM TRACING: CPT

## 2024-05-25 PROCEDURE — 99232 SBSQ HOSP IP/OBS MODERATE 35: CPT

## 2024-05-25 PROCEDURE — 87040 BLOOD CULTURE FOR BACTERIA: CPT

## 2024-05-25 PROCEDURE — 85027 COMPLETE CBC AUTOMATED: CPT

## 2024-05-25 PROCEDURE — 80048 BASIC METABOLIC PNL TOTAL CA: CPT

## 2024-05-25 PROCEDURE — 97116 GAIT TRAINING THERAPY: CPT

## 2024-05-25 PROCEDURE — 93923 UPR/LXTR ART STDY 3+ LVLS: CPT

## 2024-05-25 PROCEDURE — 84443 ASSAY THYROID STIM HORMONE: CPT

## 2024-05-25 PROCEDURE — 85025 COMPLETE CBC W/AUTO DIFF WBC: CPT

## 2024-05-25 PROCEDURE — 80197 ASSAY OF TACROLIMUS: CPT

## 2024-05-25 RX ORDER — RIVAROXABAN 15 MG-20MG
20 KIT ORAL
Refills: 0 | Status: DISCONTINUED | OUTPATIENT
Start: 2024-05-25 | End: 2024-05-25

## 2024-05-25 RX ORDER — APIXABAN 2.5 MG/1
1 TABLET, FILM COATED ORAL
Qty: 60 | Refills: 0
Start: 2024-05-25 | End: 2024-06-23

## 2024-05-25 RX ORDER — RIVAROXABAN 15 MG-20MG
1 KIT ORAL
Qty: 30 | Refills: 0
Start: 2024-05-25 | End: 2024-06-23

## 2024-05-25 RX ORDER — APIXABAN 2.5 MG/1
1 TABLET, FILM COATED ORAL
Qty: 0 | Refills: 0 | DISCHARGE
Start: 2024-05-25

## 2024-05-25 RX ORDER — LOSARTAN POTASSIUM 100 MG/1
1 TABLET, FILM COATED ORAL
Refills: 0 | DISCHARGE

## 2024-05-25 RX ORDER — ACETAMINOPHEN 500 MG
2 TABLET ORAL
Qty: 0 | Refills: 0 | DISCHARGE
Start: 2024-05-25

## 2024-05-25 RX ADMIN — AZATHIOPRINE 100 MILLIGRAM(S): 100 TABLET ORAL at 13:02

## 2024-05-25 RX ADMIN — Medication 30 MILLIGRAM(S): at 05:56

## 2024-05-25 RX ADMIN — Medication 100 MILLIGRAM(S): at 05:56

## 2024-05-25 RX ADMIN — GABAPENTIN 300 MILLIGRAM(S): 400 CAPSULE ORAL at 05:56

## 2024-05-25 RX ADMIN — Medication 1 TABLET(S): at 05:56

## 2024-05-25 RX ADMIN — Medication 81 MILLIGRAM(S): at 13:03

## 2024-05-25 RX ADMIN — APIXABAN 5 MILLIGRAM(S): 2.5 TABLET, FILM COATED ORAL at 09:34

## 2024-05-25 RX ADMIN — CARVEDILOL PHOSPHATE 12.5 MILLIGRAM(S): 80 CAPSULE, EXTENDED RELEASE ORAL at 05:57

## 2024-05-25 RX ADMIN — Medication 0.2 MILLIGRAM(S): at 05:57

## 2024-05-25 RX ADMIN — TACROLIMUS 1.5 MILLIGRAM(S): 5 CAPSULE ORAL at 09:34

## 2024-05-25 RX ADMIN — Medication 1 TABLET(S): at 13:02

## 2024-05-25 NOTE — DISCHARGE NOTE PROVIDER - NSDCCPCAREPLAN_GEN_ALL_CORE_FT
PRINCIPAL DISCHARGE DIAGNOSIS  Diagnosis: Diabetic ulcer of right foot  Assessment and Plan of Treatment: Please complete course of antibiotics as prescribed   f/u with Podiatry as recommended by them  f/u with your kidney doctor. Please call them on Tuesday to schedule an appointment, because kidney function needs to be re checked in 1 week, also hold Losartan for now until cleared by your kidney doctor  f/u with all your doctors

## 2024-05-25 NOTE — DISCHARGE NOTE PROVIDER - NSDCFUADDINST_GEN_ALL_CORE_FT
Wound Care Instructions:  -Keep dressing clean, dry, and intact to the right foot  -Patient weightbearing as tolerated in surgical shoe   -Monitor for any signs of infection including redness, swelling in the leg above the bandage, nausea/vomiting/fever/chills/chest pain/shortness of breath, if any are present patient must report to the emergency department immediately  -Patient is to follow up with Dr. Araiza/Dr. Catherine/ Dr. Campbell  within 5 days after discharge at [facility]. Please call to make an appointment [phone].

## 2024-05-25 NOTE — PROGRESS NOTE ADULT - NS ATTEND AMEND GEN_ALL_CORE FT
57yo M with a PMH of type 1 diabetes mellitus, HTN, HLD, CVA (2015), prior DVT (s/p Eliquis ~ 9 years ago), recurrent right pleural effusion with VATS, renal transplant (2013), sent in from wound care for a non healing right foot wound.      - presented from wound care center for non healing right foot wound, planned for right foot exostectomy with Dr. Catherine, today   - no acute ischemia   - continue ASA and statin   - Afib, unknown duration, CHADVasc: 5   - continue hep gtt (Full AC protocol) for thromboembolic prevention, can hold prior to surgery   - rate controlled, cont present meds  -chronic edema without left heart failure     - Pt has no active ischemia, decompensated heart failure, unstable arrythmia, or severe stenotic valvular disease. Patient is optimized from cardiovascular standpoint to proceed with planned procedure with routine hemodynamic monitoring.
59yo M with a PMH of type 1 diabetes mellitus, HTN, HLD, CVA (2015), prior DVT (s/p Eliquis ~ 9 years ago), recurrent right pleural effusion with VATS, renal transplant (2013), sent in from wound care for a non healing right foot wound.     - presented from wound care center for non healing right foot wound, s/p right foot exostectomy with Dr. Catherine (5/21/24) tolerated well from CV POV  - continue ASA and statin   - New onset Afib, unknown duration, hr controlled   - CHADVasc: 5   - now on eliquis  - BP controlled (hx of longstanding HTN)  - continue home anti hypertensives as tolerated by blood pressure: Coreg, Clonidine, hydralazine, losartan, nifedipine   - TTE (5/2024) normaL EF 58 %   - has chronic b/l LE edema, otherwise no sign of volume overload on exam
57yo M with a PMH of type 1 diabetes mellitus, HTN, HLD, CVA (2015), prior DVT (s/p Eliquis ~ 9 years ago), recurrent right pleural effusion with VATS, renal transplant (2013), sent in from wound care for a non healing right foot wound.     - presented from wound care center for non healing right foot wound, s/p right foot exostectomy with Dr. Catherine (5/21/24) tolerated well from CV POV  - continue ASA and statin   - New onset Afib, unknown duration  - CHADVasc: 5   - continue hep gtt (Full AC protocol) for thromboembolic prevention, would switch to NOAC if no other planned procedure     - BP controlled (hx of longstanding HTN)  - continue home anti hypertensives as tolerated by blood pressure: Coreg, Clonidine, hydralazine, losartan, nifedipine   - TTE (5/2024) normaL EF 58 %   - has chronic b/l LE edema, otherwise no sign of volume overload on exam
59yo M with a PMH of type 1 diabetes mellitus, HTN, HLD, CVA (2015), prior DVT (s/p Eliquis ~ 9 years ago), recurrent right pleural effusion with VATS, renal transplant (2013), sent in from wound care for a non healing right foot wound.     - presented from wound care center for non healing right foot wound, s/p right foot exostectomy with Dr. Catherine (5/21/24) tolerated well from CV POV  - continue ASA and statin   - New onset Afib, unknown duration, hr controlled   - CHADVasc: 5   - now on eliquis  - BP controlled (hx of longstanding HTN)  - continue home anti hypertensives as tolerated by blood pressure: Coreg, Clonidine, hydralazine, losartan, nifedipine   - TTE (5/2024) normaL EF 58 %   - has chronic b/l LE edema, otherwise no sign of volume overload on exam.
I have personally seen and examined the patient in detail.  I have spoken to the provider regarding the assessment and plan of care.  I have made changes to the note accordingly.

## 2024-05-25 NOTE — PROGRESS NOTE ADULT - SUBJECTIVE AND OBJECTIVE BOX
[facility] -- Luly Egan, Karan Kelly Savella, , Jay Gagnon  Office # [phone]    Follow Up: New Afib     Subjective/Observations:     REVIEW OF SYSTEMS: All other review of systems is negative unless indicated above  PAST MEDICAL & SURGICAL HISTORY:  Hypertension      Hyperlipidemia      Diabetes mellitus  Type 1 on insulin pump      CKD (chronic kidney disease)  Renal Transplant 2013 at [facility]      Diabetic peripheral neuropathy      Obesity (BMI 30-39.9)      CVA (cerebral vascular accident)  Wallenberg Stroke  low L body sensation  low R face sensation  decreased peripheral vision  poor balance  2015        Squamous cell carcinoma of skin of left ear  nose      History of DVT (deep vein thrombosis)  LLE 2016, was on Eliquis x 6 months  Was hospitalized for Rhinovirus at the time, intubated      Recurrent squamous cell carcinoma of skin  nose, L arm      History of pleural effusion  right thoracentesis 6/2022 and 8/2022      CARMEN treated with BiPAP  2L O2 at night      History of restrictive lung disease      Toe infection  2007 L 4th Toe surgery-amputation secondary to osteomyelitis  2019 partial right 2nd 4th toe amputation      Ear disease  Left inner ear surgery, 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, Can NOT have MRI      Vasectomy status  s/p b/l  vasectomy      H/O foot surgery  2005 - right foot - bone fracture - s/p ORIF and subsequent removal of hardware      End-stage renal failure with renal transplant  12/2013      S/P kidney transplant  2013      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 1000 milliLiter(s) (50 mL/Hr) IV Continuous <Continuous>  dextrose 5%. 1000 milliLiter(s) (100 mL/Hr) IV Continuous <Continuous>  dextrose 50% Injectable 25 Gram(s) IV Push once  dextrose 50% Injectable 12.5 Gram(s) IV Push once  gabapentin 300 milliGRAM(s) Oral two times a day  glucagon  Injectable 1 milliGRAM(s) IntraMuscular once  hydrALAZINE 100 milliGRAM(s) Oral three times a day  insulin lispro (HumaLOG) Pump 1 Each SubCutaneous Continuous Pump  NIFEdipine XL 30 milliGRAM(s) Oral two times a day  sodium chloride 0.9%. 1000 milliLiter(s) (75 mL/Hr) IV Continuous <Continuous>  tacrolimus 1.5 milliGRAM(s) Oral <User Schedule>  trimethoprim   80 mG/sulfamethoxazole 400 mG 1 Tablet(s) Oral daily    MEDICATIONS  (PRN):  acetaminophen     Tablet .. 650 milliGRAM(s) Oral every 6 hours PRN Temp greater or equal to 38C (100.4F), Mild Pain (1 - 3)  aluminum hydroxide/magnesium hydroxide/simethicone Suspension 30 milliLiter(s) Oral every 4 hours PRN Dyspepsia  dextrose Oral Gel 15 Gram(s) Oral once PRN Blood Glucose LESS THAN 70 milliGRAM(s)/deciliter  melatonin 3 milliGRAM(s) Oral at bedtime PRN Insomnia  ondansetron Injectable 4 milliGRAM(s) IV Push every 8 hours PRN Nausea and/or Vomiting    Allergies    No Known Allergies    Intolerances      Vital Signs Last 24 Hrs  T(C): 36.4 (25 May 2024 05:30), Max: 37.2 (24 May 2024 21:06)  T(F): 97.5 (25 May 2024 05:30), Max: 98.9 (24 May 2024 21:06)  HR: 71 (25 May 2024 05:30) (70 - 80)  BP: 152/67 (25 May 2024 05:30) (135/63 - 152/67)  BP(mean): --  RR: 17 (25 May 2024 05:30) (16 - 17)  SpO2: 95% (25 May 2024 05:30) (93% - 95%)    Parameters below as of 25 May 2024 05:30  Patient On (Oxygen Delivery Method): room air      I&O's Summary    24 May 2024 07:01  -  25 May 2024 07:00  --------------------------------------------------------  IN: 0 mL / OUT: 1900 mL / NET: -1900 mL        PHYSICAL EXAM:  TELE:   Constitutional: NAD, awake and alert, well-developed  HEENT: Moist Mucous Membranes, Anicteric  Pulmonary: Non-labored, breath sounds are clear bilaterally, No wheezing, rales or rhonchi  Cardiovascular: Regular, S1 and S2, No murmurs, rubs, gallops or clicks  Gastrointestinal: Bowel Sounds present, soft, nontender.   Lymph: No peripheral edema. No lymphadenopathy.  Skin: No visible rashes or ulcers.  Psych:  Mood & affect appropriate  LABS: All Labs Reviewed:                        11.6   6.93  )-----------( 195      ( 25 May 2024 05:33 )             36.5                         11.7   7.86  )-----------( 175      ( 24 May 2024 07:16 )             35.9                         12.0   8.18  )-----------( 190      ( 23 May 2024 07:58 )             36.2     25 May 2024 05:33    139    |  107    |  23     ----------------------------<  221    4.3     |  26     |  1.30   24 May 2024 07:16    138    |  105    |  25     ----------------------------<  147    4.0     |  27     |  1.60   23 May 2024 07:58    134    |  101    |  25     ----------------------------<  165    3.6     |  29     |  1.70     Ca    8.9        25 May 2024 05:33  Ca    9.2        24 May 2024 07:16  Ca    9.1        23 May 2024 07:58  Phos  3.6       22 May 2024 08:35  Mg     2.0       22 May 2024 08:35    TPro  6.9    /  Alb  2.6    /  TBili  0.7    /  DBili  x      /  AST  15     /  ALT  13     /  AlkPhos  75     24 May 2024 07:16    PTT - ( 23 May 2024 07:58 )  PTT:32.6 sec      12 Lead ECG:   Ventricular Rate 70 BPM    Atrial Rate 110 BPM    QRS Duration 100 ms    Q-T Interval 400 ms    QTC Calculation(Bazett) 432 ms    R Axis -4 degrees    T Axis 19 degrees    Diagnosis Line Atrial fibrillation  Abnormal ECG  When compared with ECG of 20-MAY-2024 10:00,  No significant change was found  Confirmed by Marko Mauricio MD ([id number]) on 5/21/2024 12:46:31 PM (05-21-24 @ 09:12)      TRANSTHORACIC ECHOCARDIOGRAM REPORT  ________________________________________________________________________________                                      _______       Pt. Name:       LUTHER NORIEGA Study Date:    5/20/2024  MRN:            [id number]            YOB: 1965  Accession #:    [id number]           Age:           58 years  Account#:       [id number]          Gender:        M  Visit ID#  Heart Rate:                         Height:        75.98 in (193.00 cm)  Rhythm:                            Weight:        328.48 lb (149.00 kg)  Blood Pressure: 117/71 mmHg         BSA/BMI:       2.74 m² / 40.00 kg/m²  ________________________________________________________________________________________  Referring Physician:   [id number] Kristian Castro  Interpreting Physician: Marko Mauricio  Primary Sonographer:    Ferdinand Pereira    CPT:               ECHO TTE WO CON COMP W DOPP - 48244.m  Indication(s):     Abnormal electrocardiogram ECG/EKG - R94.31  Procedure:         Transthoracic echocardiogram with 2-D, M-mode and complete                     spectral and color flow Doppler.  Ordering Location: [facility]  Admission Status:  ED  Study Information: Image quality for this study is technically difficult.    _______________________________________________________________________________________     CONCLUSIONS:      1. Technically difficult image quality.   2. Left ventricular systolic function is normal with an ejection fraction of 58 % by Garay's method of disks.   3. The right ventricle is not well visualized. probably normal systolic function.   4. The left atrium is normal in size.   5. The right atrium is normal in size.   6. Tricuspid aortic valve with normal leaflet excursion. There is focal calcification of the aortic valve leaflets.   7. There is mild calcification of the mitral valve annulus.   8. Trace mitral regurgitation.   9. Trace tricuspid regurgitation.  10. The inferior vena cava is normal in size measuring 1.89 cm in diameter, (normal <2.1cm) with normal inspiratory collapse (normal >50%) consistent with normal right atrial pressure (~3, range 0-5mmHg).    ________________________________________________________________________________________  FINDINGS:     Left Ventricle:  Left ventricular systolic function is normal with a calculated ejection fraction of 58 % by the Garay's biplane method of disks. The left ventricular diastolic function is indeterminate.     Right Ventricle:  The right ventricle is not well visualized. Probably normal systolic function. Tricuspid annular plane systolic excursion (TAPSE) is 1.6 cm (normal >=1.7 cm).     Left Atrium:  The left atrium is normal in size with an indexed volume of 23.65 ml/m².     Right Atrium:  The right atrium is normal in size.     Aortic Valve:  The aortic valve is tricuspid with normal leaflet excursion. There is focal calcification of the aortic valve leaflets.     Mitral Valve:  There is mild calcification of the mitral valve annulus. There is trace mitral regurgitation.     Tricuspid Valve:  There is trace tricuspid regurgitation.     Pulmonic Valve:  The pulmonic valve was not well visualized. There is trace pulmonic regurgitation.     Aorta:  The aortic root at the sinuses of Valsalva is normal in size, measuring 3.20 cm (indexed 1.17 cm/m²). The aortic arch diameter is normal in size, measuring 2.8 cm (indexed 1.02 cm/m²).     Systemic Veins:  The inferior vena cava is normal in size measuring 1.89 cm in diameter, (normal <2.1cm) with normal inspiratory collapse (normal >50%) consistent with normal right atrial pressure (~3, range 0-5mmHg).  ____________________________________________________________________  QUANTITATIVE DATA:  Left Ventricle Measurements: (Indexed to BSA)     IVSd (2D):   1.2 cm  LVPWd (2D):  1.1 cm  LVIDd (2D):  4.9 cm  LVIDs (2D):  3.1 cm  LV Mass:     222 g  81.0 g/m²  LV Vol d, MOD A2C: 68.5 ml 25.04 ml/m²  LV Vol d, MOD A4C: 78.7 ml 28.77 ml/m²  LV Vol d, MOD BP:  75.3 ml 27.52 ml/m²  LV Vol s, MOD A2C: 23.4 ml 8.55 ml/m²  LV Vol s, MOD A4C: 34.1 ml 12.47 ml/m²  LV Vol s, MOD BP:  31.5 ml 11.50 ml/m²  LVOT SV MOD BP:    43.8 ml  LV EF% MOD BP:     58 %     MV E Vmax:    1.18 m/s  MV A Vmax:    0.35 m/s  MV E/A:       3.41  e' lateral:   10.90 cm/s  e' medial:    11.00cm/s  E/e' lateral: 10.83  E/e' medial:  10.73  E/e' Average: 10.78  MV DT:        219 msec    Aorta Measurements: (Normal range) (Indexed to BSA)     Sinuses of Valsalva: 3.20 cm (3.1 - 3.7 cm)  Ao Arch:             2.8 cm       Left Atrium Measurements: (Indexed to BSA)  LA Diam 2D: 4.10 cm    Right Ventricle Measurements:     TAPSE:            1.6 cm  RV Base (RVID1):  3.8 cm  RV Mid (RVID2):   3.3 cm  RV Major (RVID3): 6.7 cm       LVOT / RVOT/ Qp/Qs Data: (Indexed to BSA)  LVOT Diameter: 2.20 cm  LVOT Area:     3.80 cm²    Mitral Valve Measurements:     MV E Vmax: 1.2 m/s  MV A Vmax: 0.3 m/s  MV E/A:    3.4       Tricuspid Valve Measurements:     RA Pressure: 3 mmHg    ________________________________________________________________________________________  Electronically signed on 5/20/2024 at 5:52:45 PM by Marko Mauricio         *** Final ***      [facility] -- Luly Egan, Karan Kelly Savella, , Jay Gagnon  Office # [phone]    Follow Up: New Afib     Subjective/Observations: Sitting up on the chair.  Denies foot pain.  Comfortable on RA.  Denies any form of respiratory or cardiac discomfort    REVIEW OF SYSTEMS: All other review of systems is negative unless indicated above  PAST MEDICAL & SURGICAL HISTORY:  Hypertension      Hyperlipidemia      Diabetes mellitus  Type 1 on insulin pump      CKD (chronic kidney disease)  Renal Transplant 2013 at [facility]      Diabetic peripheral neuropathy      Obesity (BMI 30-39.9)    CVA (cerebral vascular accident)  Wallenberg Stroke  low L body sensation  low R face sensation  decreased peripheral vision  poor balance  2015    Squamous cell carcinoma of skin of left ear  nose    History of DVT (deep vein thrombosis)  LLE 2016, was on Eliquis x 6 months  Was hospitalized for Rhinovirus at the time, intubated      Recurrent squamous cell carcinoma of skin  nose, L arm      History of pleural effusion  right thoracentesis 6/2022 and 8/2022      CARMEN treated with BiPAP  2L O2 at night      History of restrictive lung disease      Toe infection  2007 L 4th Toe surgery-amputation secondary to osteomyelitis  2019 partial right 2nd 4th toe amputation      Ear disease  Left inner ear surgery, 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, Can NOT have MRI      Vasectomy status  s/p b/l  vasectomy      H/O foot surgery  2005 - right foot - bone fracture - s/p ORIF and subsequent removal of hardware      End-stage renal failure with renal transplant  12/2013      S/P kidney transplant  2013      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      History of loop recorder  2015    MEDICATIONS  (STANDING):  amoxicillin  875 milliGRAM(s)/clavulanate 1 Tablet(s) Oral two times a day  apixaban 5 milliGRAM(s) Oral two times a day  aspirin  chewable 81 milliGRAM(s) Oral daily  atorvastatin 10 milliGRAM(s) Oral at bedtime  azaTHIOprine 100 milliGRAM(s) Oral daily  carvedilol 12.5 milliGRAM(s) Oral every 12 hours  cloNIDine 0.2 milliGRAM(s) Oral three times a day  dextrose 10% Bolus 125 milliLiter(s) IV Bolus once  dextrose 5%. 1000 milliLiter(s) (50 mL/Hr) IV Continuous <Continuous>  dextrose 5%. 1000 milliLiter(s) (100 mL/Hr) IV Continuous <Continuous>  dextrose 50% Injectable 25 Gram(s) IV Push once  dextrose 50% Injectable 12.5 Gram(s) IV Push once  gabapentin 300 milliGRAM(s) Oral two times a day  glucagon  Injectable 1 milliGRAM(s) IntraMuscular once  hydrALAZINE 100 milliGRAM(s) Oral three times a day  insulin lispro (HumaLOG) Pump 1 Each SubCutaneous Continuous Pump  NIFEdipine XL 30 milliGRAM(s) Oral two times a day  sodium chloride 0.9%. 1000 milliLiter(s) (75 mL/Hr) IV Continuous <Continuous>  tacrolimus 1.5 milliGRAM(s) Oral <User Schedule>  trimethoprim   80 mG/sulfamethoxazole 400 mG 1 Tablet(s) Oral daily    MEDICATIONS  (PRN):  acetaminophen     Tablet .. 650 milliGRAM(s) Oral every 6 hours PRN Temp greater or equal to 38C (100.4F), Mild Pain (1 - 3)  aluminum hydroxide/magnesium hydroxide/simethicone Suspension 30 milliLiter(s) Oral every 4 hours PRN Dyspepsia  dextrose Oral Gel 15 Gram(s) Oral once PRN Blood Glucose LESS THAN 70 milliGRAM(s)/deciliter  melatonin 3 milliGRAM(s) Oral at bedtime PRN Insomnia  ondansetron Injectable 4 milliGRAM(s) IV Push every 8 hours PRN Nausea and/or Vomiting    Allergies    No Known Allergies    Intolerances    Vital Signs Last 24 Hrs  T(C): 36.4 (25 May 2024 05:30), Max: 37.2 (24 May 2024 21:06)  T(F): 97.5 (25 May 2024 05:30), Max: 98.9 (24 May 2024 21:06)  HR: 71 (25 May 2024 05:30) (70 - 80)  BP: 152/67 (25 May 2024 05:30) (135/63 - 152/67)  BP(mean): --  RR: 17 (25 May 2024 05:30) (16 - 17)  SpO2: 95% (25 May 2024 05:30) (93% - 95%)    Parameters below as of 25 May 2024 05:30  Patient On (Oxygen Delivery Method): room air    I&O's Summary    24 May 2024 07:01  -  25 May 2024 07:00  --------------------------------------------------------  IN: 0 mL / OUT: 1900 mL / NET: -1900 mL      PHYSICAL EXAM:  TELE: Not on tele  Constitutional: NAD, awake and alert  HEENT: Moist Mucous Membranes, Anicteric  Pulmonary: Non-labored, breath sounds are clear bilaterally, No wheezing, rales or rhonchi  Cardiovascular: IRRR, S1 and S2, No murmurs, rubs, gallops or clicks  Gastrointestinal: Bowel Sounds present, soft, nontender.   Lymph: No peripheral edema. No lymphadenopathy.  Skin: No visible rashes.  right foot/leg dressing dry and intact  Psych:  Mood & affect appropriate  LABS: All Labs Reviewed:                        11.6   6.93  )-----------( 195      ( 25 May 2024 05:33 )             36.5                         11.7   7.86  )-----------( 175      ( 24 May 2024 07:16 )             35.9                         12.0   8.18  )-----------( 190      ( 23 May 2024 07:58 )             36.2     25 May 2024 05:33    139    |  107    |  23     ----------------------------<  221    4.3     |  26     |  1.30   24 May 2024 07:16    138    |  105    |  25     ----------------------------<  147    4.0     |  27     |  1.60   23 May 2024 07:58    134    |  101    |  25     ----------------------------<  165    3.6     |  29     |  1.70     Ca    8.9        25 May 2024 05:33  Ca    9.2        24 May 2024 07:16  Ca    9.1        23 May 2024 07:58  Phos  3.6       22 May 2024 08:35  Mg     2.0       22 May 2024 08:35    TPro  6.9    /  Alb  2.6    /  TBili  0.7    /  DBili  x      /  AST  15     /  ALT  13     /  AlkPhos  75     24 May 2024 07:16    PTT - ( 23 May 2024 07:58 )  PTT:32.6 sec      12 Lead ECG:   Ventricular Rate 70 BPM    Atrial Rate 110 BPM    QRS Duration 100 ms    Q-T Interval 400 ms    QTC Calculation(Bazett) 432 ms    R Axis -4 degrees    T Axis 19 degrees    Diagnosis Line Atrial fibrillation  Abnormal ECG  When compared with ECG of 20-MAY-2024 10:00,  No significant change was found  Confirmed by Marko Mauricio MD ([id number]) on 5/21/2024 12:46:31 PM (05-21-24 @ 09:12)      TRANSTHORACIC ECHOCARDIOGRAM REPORT  ________________________________________________________________________________                                      _______       Pt. Name:       LUTHER NORIEGA Study Date:    5/20/2024  MRN:            [id number]            YOB: 1965  Accession #:    [id number]           Age:           58 years  Account#:       [id number]          Gender:        M  Visit ID#  Heart Rate:                         Height:        75.98 in (193.00 cm)  Rhythm:                            Weight:        328.48 lb (149.00 kg)  Blood Pressure: 117/71 mmHg         BSA/BMI:       2.74 m² / 40.00 kg/m²  ________________________________________________________________________________________  Referring Physician:   [id number] Kristian Castro  Interpreting Physician: Marko Mauricio  Primary Sonographer:    Ferdinand Pereira    CPT:               ECHO TTE WO CON COMP W DOPP - 01792.m  Indication(s):     Abnormal electrocardiogram ECG/EKG - R94.31  Procedure:         Transthoracic echocardiogram with 2-D, M-mode and complete                     spectral and color flow Doppler.  Ordering Location: [facility]  Admission Status:  ED  Study Information: Image quality for this study is technically difficult.    _______________________________________________________________________________________     CONCLUSIONS:      1. Technically difficult image quality.   2. Left ventricular systolic function is normal with an ejection fraction of 58 % by Garay's method of disks.   3. The right ventricle is not well visualized. probably normal systolic function.   4. The left atrium is normal in size.   5. The right atrium is normal in size.   6. Tricuspid aortic valve with normal leaflet excursion. There is focal calcification of the aortic valve leaflets.   7. There is mild calcification of the mitral valve annulus.   8. Trace mitral regurgitation.   9. Trace tricuspid regurgitation.  10. The inferior vena cava is normal in size measuring 1.89 cm in diameter, (normal <2.1cm) with normal inspiratory collapse (normal >50%) consistent with normal right atrial pressure (~3, range 0-5mmHg).    ________________________________________________________________________________________  FINDINGS:     Left Ventricle:  Left ventricular systolic function is normal with a calculated ejection fraction of 58 % by the Garay's biplane method of disks. The left ventricular diastolic function is indeterminate.     Right Ventricle:  The right ventricle is not well visualized. Probably normal systolic function. Tricuspid annular plane systolic excursion (TAPSE) is 1.6 cm (normal >=1.7 cm).     Left Atrium:  The left atrium is normal in size with an indexed volume of 23.65 ml/m².     Right Atrium:  The right atrium is normal in size.     Aortic Valve:  The aortic valve is tricuspid with normal leaflet excursion. There is focal calcification of the aortic valve leaflets.     Mitral Valve:  There is mild calcification of the mitral valve annulus. There is trace mitral regurgitation.     Tricuspid Valve:  There is trace tricuspid regurgitation.     Pulmonic Valve:  The pulmonic valve was not well visualized. There is trace pulmonic regurgitation.     Aorta:  The aortic root at the sinuses of Valsalva is normal in size, measuring 3.20 cm (indexed 1.17 cm/m²). The aortic arch diameter is normal in size, measuring 2.8 cm (indexed 1.02 cm/m²).     Systemic Veins:  The inferior vena cava is normal in size measuring 1.89 cm in diameter, (normal <2.1cm) with normal inspiratory collapse (normal >50%) consistent with normal right atrial pressure (~3, range 0-5mmHg).  ____________________________________________________________________  QUANTITATIVE DATA:  Left Ventricle Measurements: (Indexed to BSA)     IVSd (2D):   1.2 cm  LVPWd (2D):  1.1 cm  LVIDd (2D):  4.9 cm  LVIDs (2D):  3.1 cm  LV Mass:     222 g  81.0 g/m²  LV Vol d, MOD A2C: 68.5 ml 25.04 ml/m²  LV Vol d, MOD A4C: 78.7 ml 28.77 ml/m²  LV Vol d, MOD BP:  75.3 ml 27.52 ml/m²  LV Vol s, MOD A2C: 23.4 ml 8.55 ml/m²  LV Vol s, MOD A4C: 34.1 ml 12.47 ml/m²  LV Vol s, MOD BP:  31.5 ml 11.50 ml/m²  LVOT SV MOD BP:    43.8 ml  LV EF% MOD BP:     58 %     MV E Vmax:    1.18 m/s  MV A Vmax:    0.35 m/s  MV E/A:       3.41  e' lateral:   10.90 cm/s  e' medial:    11.00cm/s  E/e' lateral: 10.83  E/e' medial:  10.73  E/e' Average: 10.78  MV DT:        219 msec    Aorta Measurements: (Normal range) (Indexed to BSA)     Sinuses of Valsalva: 3.20 cm (3.1 - 3.7 cm)  Ao Arch:             2.8 cm       Left Atrium Measurements: (Indexed to BSA)  LA Diam 2D: 4.10 cm    Right Ventricle Measurements:     TAPSE:            1.6 cm  RV Base (RVID1):  3.8 cm  RV Mid (RVID2):   3.3 cm  RV Major (RVID3): 6.7 cm       LVOT / RVOT/ Qp/Qs Data: (Indexed to BSA)  LVOT Diameter: 2.20 cm  LVOT Area:     3.80 cm²    Mitral Valve Measurements:     MV E Vmax: 1.2 m/s  MV A Vmax: 0.3 m/s  MV E/A:    3.4       Tricuspid Valve Measurements:     RA Pressure: 3 mmHg    ________________________________________________________________________________________  Electronically signed on 5/20/2024 at 5:52:45 PM by Marko Mauricio         *** Final ***

## 2024-05-25 NOTE — CHART NOTE - NSCHARTNOTEFT_GEN_A_CORE
Received call from patient's pharmacy that ELIQUIS needs prior authorization. D/w cardio, recommended to change to Xarelto 20mg po daily. Prescription sent. Patient to f/u with them as out patient Received call from patient's pharmacy that PAULETTESTEVANSEBAS needs prior authorization. D/w cardio, recommended to change to Xarelto 20mg po daily. Prescription sent. Patient to f/u with them as out patient. d/w patient and updated him. he agreed with the plan and will f/u with his cardio next week

## 2024-05-25 NOTE — DISCHARGE NOTE PROVIDER - NSDCFUSCHEDAPPT_GEN_ALL_CORE_FT
Becki Campbell  [facility]  PODIATRY [address] [facility]  Scheduled Appointment: 07/08/2024    José Kelly  [facility]  CARDIOLOGY [address] [facility]  Scheduled Appointment: 07/15/2024

## 2024-05-25 NOTE — PROGRESS NOTE ADULT - ASSESSMENT
MAYRA on CKD 3, h/o Kidney transplant 2013 [facility]  Diabetes Type 1, Diabetic foot ulcer, Right foot wound, s/p surgery  Hypertension    05/24/24: Renal indices improving. Will continue IV hydration. Check tacrolimus levels. Hold ARB. Encourage PO intake as tolerated. To continue current meds.   Continue prograf and azathioprine. Monitor blood sugar levels. Insulin coverage as needed. Monitor BP trend. Titrate BP meds as needed.   Podiatry follow up. Will follow electrolytes and renal function trend.   05/25/24: Chart reviewed. Improved renal indices. D/c planning off losartan which can be resumed as out pt.

## 2024-05-25 NOTE — PROGRESS NOTE ADULT - NS_MD_PANP_GEN_ALL_CORE
Attending and PA/NP shared services statement (NON-critical care):
No

## 2024-05-25 NOTE — DISCHARGE NOTE PROVIDER - NSDCFUADDAPPT_GEN_ALL_CORE_FT
Wound Care Instructions: Per Podiatry   -Keep dressing clean, dry, and intact to the right foot  -Patient weightbearing as tolerated in surgical shoe   -Monitor for any signs of infection including redness, swelling in the leg above the bandage, nausea/vomiting/fever/chills/chest pain/shortness of breath, if any are present patient must report to the emergency department immediately  -Patient is to follow up with Dr. Araiza/Dr. Catherine/ Dr. Campbell  within 5 days after discharge at [facility]. Please call to make an appointment [phone].

## 2024-05-25 NOTE — PROGRESS NOTE ADULT - PROBLEM SELECTOR PROBLEM 1
Type 1 diabetes mellitus
Type 1 diabetes mellitus with hyperglycemia
Wound of right foot

## 2024-05-25 NOTE — DISCHARGE NOTE PROVIDER - HOSPITAL COURSE
57yo M with a PMH of type 1 diabetes mellitus, HTN, HLD, CVA (2015), prior DVT (s/p Eliquis), recurrent right pleural effusion with VATS, renal transplant (2013), sent in from wound care for a non healing right foot wound.     - S/p right foot exostectomy 5/21/24 with Dr. Catherine, pathology reviewed.   - Blood cultures NGTD   -Pathology  w/o bone; just dense fibroconnective tissue  - Podiatry and ID saw patient   - S/p IV Zosyn   - Vascular also saw patient   -ID suggested Po Augmentin upon dc   -Patient also noted to have MAYRA, improved with IV fluids and Holding Losartan. Seen by Dr. Bryant, suggested d/c off of Losartan  -Patient to f/u with all the doctors as outpatient       Patient seen and examined at bedside. VSS  GEN: NAD, AAOx 3   HEENT:  anicteric, moist mucous membranes  CHEST/LUNG:  CTA b/l, no rales, wheezes, or rhonchi  HEART:  S1, S2 , irregular/ no tachy    ABDOMEN:  BS+, soft, nontender, nondistended  EXTREMITIES: + right foot dressed, clean dry   NERVOUS SYSTEM: answers questions and follows commands appropriately  Skin: warm, dry, no rash       Total discharge time spent 55 minutes, including medication reconciliation, counseling and education, prescription writing.

## 2024-05-25 NOTE — PROGRESS NOTE ADULT - REASON FOR ADMISSION
Right foot wound

## 2024-05-25 NOTE — DISCHARGE NOTE PROVIDER - NSDCMRMEDTOKEN_GEN_ALL_CORE_FT
acetaminophen 325 mg oral tablet: 2 tab(s) orally every 6 hours As needed Temp greater or equal to 38C (100.4F), Mild Pain (1 - 3)  amoxicillin-clavulanate 875 mg-125 mg oral tablet: 1 tab(s) orally 2 times a day  apixaban 5 mg oral tablet: 1 tab(s) orally 2 times a day  aspirin 81 mg oral tablet: 1 tab(s) orally once a day  azaTHIOprine 100 mg oral tablet: 1 tab(s) orally once a day, am  Bactrim 400 mg-80 mg oral tablet: 1 tab(s) orally once a day, am  carvedilol 12.5 mg oral tablet: 1 tab(s) orally 2 times a day  cloNIDine 0.2 mg oral tablet: 1 tab(s) orally 3 times a day  gabapentin 300 mg oral capsule: 1 tab(s) orally 2 times a day  Humalog pump: 3 unit(s) per hour 24hrs/day with 3 bolus with meals.   hydrALAZINE 100 mg oral tablet: 1 tab(s) orally 3 times a day  lovastatin 40 mg oral tablet: 1 tab(s) orally once a day, pm  NIFEdipine (Eqv-Adalat CC) 30 mg oral tablet, extended release: 1 tab(s) orally 2 times a day  tacrolimus: 1.5 milligram(s) orally 2 times a  (9:30am, 9:30pm)- *strictly timed*   acetaminophen 325 mg oral tablet: 2 tab(s) orally every 6 hours As needed Temp greater or equal to 38C (100.4F), Mild Pain (1 - 3)  amoxicillin-clavulanate 875 mg-125 mg oral tablet: 1 tab(s) orally 2 times a day  aspirin 81 mg oral tablet: 1 tab(s) orally once a day  azaTHIOprine 100 mg oral tablet: 1 tab(s) orally once a day, am  Bactrim 400 mg-80 mg oral tablet: 1 tab(s) orally once a day, am  carvedilol 12.5 mg oral tablet: 1 tab(s) orally 2 times a day  cloNIDine 0.2 mg oral tablet: 1 tab(s) orally 3 times a day  gabapentin 300 mg oral capsule: 1 tab(s) orally 2 times a day  Home PT: as directed MDD: 1  Humalog pump: 3 unit(s) per hour 24hrs/day with 3 bolus with meals.   hydrALAZINE 100 mg oral tablet: 1 tab(s) orally 3 times a day  lovastatin 40 mg oral tablet: 1 tab(s) orally once a day, pm  NIFEdipine (Eqv-Adalat CC) 30 mg oral tablet, extended release: 1 tab(s) orally 2 times a day  rivaroxaban 20 mg oral tablet: 1 tab(s) orally once a day (before a meal)  tacrolimus: 1.5 milligram(s) orally 2 times a  (9:30am, 9:30pm)- *strictly timed*

## 2024-05-25 NOTE — DISCHARGE NOTE PROVIDER - CARE PROVIDERS DIRECT ADDRESSES
[email or web address]

## 2024-05-25 NOTE — PROGRESS NOTE ADULT - SUBJECTIVE AND OBJECTIVE BOX
CAPILLARY BLOOD GLUCOSE      POCT Blood Glucose.: 166 mg/dL (24 May 2024 21:20)  POCT Blood Glucose.: 142 mg/dL (24 May 2024 17:04)  POCT Blood Glucose.: 176 mg/dL (24 May 2024 12:14)  POCT Blood Glucose.: 146 mg/dL (24 May 2024 08:22)      Vital Signs Last 24 Hrs  T(C): 36.4 (25 May 2024 05:30), Max: 37.2 (24 May 2024 21:06)  T(F): 97.5 (25 May 2024 05:30), Max: 98.9 (24 May 2024 21:06)  HR: 71 (25 May 2024 05:30) (70 - 80)  BP: 152/67 (25 May 2024 05:30) (135/63 - 152/67)  BP(mean): --  RR: 17 (25 May 2024 05:30) (16 - 17)  SpO2: 95% (25 May 2024 05:30) (93% - 95%)    Parameters below as of 25 May 2024 05:30  Patient On (Oxygen Delivery Method): room air      Respiratory: CTA B/L  CV: RRR, S1S2, no murmurs, rubs or gallops  Abdominal: Soft, NT, ND +BS, Last BM  Extremities: le foot dsg intact     05-25    139  |  107  |  23  ----------------------------<  221<H>  4.3   |  26  |  1.30    Ca    8.9      25 May 2024 05:33    TPro  6.9  /  Alb  2.6<L>  /  TBili  0.7  /  DBili  x   /  AST  15  /  ALT  13  /  AlkPhos  75  05-24      atorvastatin 10 milliGRAM(s) Oral at bedtime  dextrose 50% Injectable 25 Gram(s) IV Push once  dextrose 50% Injectable 12.5 Gram(s) IV Push once  dextrose Oral Gel 15 Gram(s) Oral once PRN  glucagon  Injectable 1 milliGRAM(s) IntraMuscular once  insulin lispro (HumaLOG) Pump 1 Each SubCutaneous Continuous Pump

## 2024-05-25 NOTE — DISCHARGE NOTE PROVIDER - CARE PROVIDER_API CALL
Calvin Chi  Nephrology  [address] [place] [id number]  Phone: [phone]  Fax: [phone]  Follow Up Time:

## 2024-05-25 NOTE — PROGRESS NOTE ADULT - PROVIDER SPECIALTY LIST ADULT
Cardiology
Infectious Disease
Nephrology
Podiatry
Podiatry
Cardiology
Cardiology
Infectious Disease
Infectious Disease
Cardiology
Cardiology
Nephrology
Infectious Disease
Vascular Surgery
Endocrinology
Podiatry
Hospitalist
Diabetes
Hospitalist

## 2024-05-25 NOTE — CASE MANAGEMENT PROGRESS NOTE - NSCMPROGRESSNOTE_GEN_ALL_CORE
Per MD, patient is medically cleared for discharge home today.  CM met with patient to discussed discharge disposition home with home PT (script given). Discharge notice signed and copy given to patient. Patient stated his wife will transport him home. Patient verbalized understanding of the transition plan and is in agreement.  CM remains available throughout hospital stay.

## 2024-05-25 NOTE — PROGRESS NOTE ADULT - ASSESSMENT
59yo M with a PMH of type 1 diabetes mellitus, HTN, HLD, CVA (2015), prior DVT (s/p Eliquis ~ 9 years ago), recurrent right pleural effusion with VATS, renal transplant (2013), sent in from wound care for a non healing right foot wound.     Cardiac optimization, New onset Afib   - Presented from wound care center for non healing right foot wound, s/p right foot exostectomy, tolerated well from CV POV, pending path/cx   - Abx per ID following.  Pain control per Primary    - EKG : Afib 60 bpm, no acute ischemia noted, new from prior read  - No anginal complaints  - Continue ASA and statin     - New onset Afib, unknown duration  - CHADVasc: 5   - Eliquis to be switched to Xarelto upon D/C due to insurance issues  - Rate-controlled Afib  - Continue Coreg     - BP stable but on the higher side of 140's, likely, with some component from postop pain  - Continue home anti hypertensives as tolerated by blood pressure: Clonidine, hydralazine, losartan, nifedipine     - Follows with Dr. Kelly (o/p cardiologist)   - Will continue to follow.  Stable from cardiac standpoint    Miracle Fowler DNP, NP-C, AGACNP-C  Cardiology   Call TEAMS

## 2024-05-25 NOTE — PROGRESS NOTE ADULT - SUBJECTIVE AND OBJECTIVE BOX
Patient is a 58y old  Male who presents with a chief complaint of Right foot wound (25 May 2024 08:08)  Patient seen in follow up for MAYRA, CKD, h/o kidney transplant.        PAST MEDICAL HISTORY:  Hypertension    Hyperlipidemia    Diabetes mellitus    CKD (chronic kidney disease)    Diabetic peripheral neuropathy    Obesity (BMI 30-39.9)    CVA (cerebral vascular accident)    Squamous cell carcinoma of skin of left ear    History of DVT (deep vein thrombosis)    Recurrent squamous cell carcinoma of skin    History of pleural effusion    CARMEN treated with BiPAP    History of restrictive lung disease      MEDICATIONS  (STANDING):  amoxicillin  875 milliGRAM(s)/clavulanate 1 Tablet(s) Oral two times a day  aspirin  chewable 81 milliGRAM(s) Oral daily  atorvastatin 10 milliGRAM(s) Oral at bedtime  azaTHIOprine 100 milliGRAM(s) Oral daily  carvedilol 12.5 milliGRAM(s) Oral every 12 hours  cloNIDine 0.2 milliGRAM(s) Oral three times a day  dextrose 10% Bolus 125 milliLiter(s) IV Bolus once  dextrose 5%. 1000 milliLiter(s) (50 mL/Hr) IV Continuous <Continuous>  dextrose 5%. 1000 milliLiter(s) (100 mL/Hr) IV Continuous <Continuous>  dextrose 50% Injectable 25 Gram(s) IV Push once  dextrose 50% Injectable 12.5 Gram(s) IV Push once  gabapentin 300 milliGRAM(s) Oral two times a day  glucagon  Injectable 1 milliGRAM(s) IntraMuscular once  hydrALAZINE 100 milliGRAM(s) Oral three times a day  insulin lispro (HumaLOG) Pump 1 Each SubCutaneous Continuous Pump  NIFEdipine XL 30 milliGRAM(s) Oral two times a day  rivaroxaban 20 milliGRAM(s) Oral with dinner  sodium chloride 0.9%. 1000 milliLiter(s) (75 mL/Hr) IV Continuous <Continuous>  tacrolimus 1.5 milliGRAM(s) Oral <User Schedule>  trimethoprim   80 mG/sulfamethoxazole 400 mG 1 Tablet(s) Oral daily    MEDICATIONS  (PRN):  acetaminophen     Tablet .. 650 milliGRAM(s) Oral every 6 hours PRN Temp greater or equal to 38C (100.4F), Mild Pain (1 - 3)  aluminum hydroxide/magnesium hydroxide/simethicone Suspension 30 milliLiter(s) Oral every 4 hours PRN Dyspepsia  dextrose Oral Gel 15 Gram(s) Oral once PRN Blood Glucose LESS THAN 70 milliGRAM(s)/deciliter  melatonin 3 milliGRAM(s) Oral at bedtime PRN Insomnia  ondansetron Injectable 4 milliGRAM(s) IV Push every 8 hours PRN Nausea and/or Vomiting    T(C): 36.4 (05-25-24 @ 05:30), Max: 37.6 (05-23-24 @ 21:04)  HR: 71 (05-25-24 @ 05:30) (69 - 80)  BP: 152/67 (05-25-24 @ 05:30) (105/55 - 152/67)  RR: 17 (05-25-24 @ 05:30)  SpO2: 95% (05-25-24 @ 05:30)  Wt(kg): --  I&O's Detail    24 May 2024 07:01  -  25 May 2024 07:00  --------------------------------------------------------  IN:  Total IN: 0 mL    OUT:    Voided (mL): 1900 mL  Total OUT: 1900 mL    Total NET: -1900 mL              PHYSICAL EXAM:    LABORATORY:                        11.6   6.93  )-----------( 195      ( 25 May 2024 05:33 )             36.5     05-25    139  |  107  |  23  ----------------------------<  221<H>  4.3   |  26  |  1.30    Ca    8.9      25 May 2024 05:33    TPro  6.9  /  Alb  2.6<L>  /  TBili  0.7  /  DBili  x   /  AST  15  /  ALT  13  /  AlkPhos  75  05-24    Sodium: 139 mmol/L (05-25 @ 05:33)  Sodium: 138 mmol/L (05-24 @ 07:16)    Potassium: 4.3 mmol/L (05-25 @ 05:33)  Potassium: 4.0 mmol/L (05-24 @ 07:16)    Hemoglobin: 11.6 g/dL (05-25 @ 05:33)  Hemoglobin: 11.7 g/dL (05-24 @ 07:16)  Hemoglobin: 12.0 g/dL (05-23 @ 07:58)    Creatinine, Serum 1.30 (05-25 @ 05:33)  Creatinine, Serum 1.60 (05-24 @ 07:16)  Creatinine, Serum 1.70 (05-23 @ 07:58)        LIVER FUNCTIONS - ( 24 May 2024 07:16 )  Alb: 2.6 g/dL / Pro: 6.9 g/dL / ALK PHOS: 75 U/L / ALT: 13 U/L / AST: 15 U/L / GGT: x           Urinalysis Basic - ( 25 May 2024 05:33 )    Color: x / Appearance: x / SG: x / pH: x  Gluc: 221 mg/dL / Ketone: x  / Bili: x / Urobili: x   Blood: x / Protein: x / Nitrite: x   Leuk Esterase: x / RBC: x / WBC x   Sq Epi: x / Non Sq Epi: x / Bacteria: x      General: No distress  Respiratory: b/l air entry  Cardiovascular: S1 S2  Gastrointestinal: soft  Extremities:  edema

## 2024-05-26 LAB
CULTURE RESULTS: ABNORMAL
ORGANISM # SPEC MICROSCOPIC CNT: ABNORMAL
ORGANISM # SPEC MICROSCOPIC CNT: SIGNIFICANT CHANGE UP
SPECIMEN SOURCE: SIGNIFICANT CHANGE UP

## 2024-05-29 ENCOUNTER — APPOINTMENT (OUTPATIENT)
Dept: WOUND CARE | Facility: HOSPITAL | Age: 59
End: 2024-05-29

## 2024-05-29 ENCOUNTER — OUTPATIENT (OUTPATIENT)
Dept: OUTPATIENT SERVICES | Facility: HOSPITAL | Age: 59
LOS: 1 days | Discharge: HOME | End: 2024-05-29
Payer: MEDICARE

## 2024-05-29 VITALS
WEIGHT: 315 LBS | HEIGHT: 73 IN | SYSTOLIC BLOOD PRESSURE: 131 MMHG | OXYGEN SATURATION: 95 % | DIASTOLIC BLOOD PRESSURE: 68 MMHG | BODY MASS INDEX: 41.75 KG/M2 | RESPIRATION RATE: 18 BRPM | HEART RATE: 87 BPM | TEMPERATURE: 98.8 F

## 2024-05-29 DIAGNOSIS — I48.91 UNSPECIFIED ATRIAL FIBRILLATION: ICD-10-CM

## 2024-05-29 DIAGNOSIS — Z09 ENCOUNTER FOR FOLLOW-UP EXAMINATION AFTER COMPLETED TREATMENT FOR CONDITIONS OTHER THAN MALIGNANT NEOPLASM: ICD-10-CM

## 2024-05-29 DIAGNOSIS — N18.6 END STAGE RENAL DISEASE: Chronic | ICD-10-CM

## 2024-05-29 DIAGNOSIS — T81.89XD OTHER COMPLICATIONS OF PROCEDURES, NOT ELSEWHERE CLASSIFIED, SUBSEQUENT ENCOUNTER: ICD-10-CM

## 2024-05-29 DIAGNOSIS — Z89.421 ACQUIRED ABSENCE OF OTHER RIGHT TOE(S): Chronic | ICD-10-CM

## 2024-05-29 DIAGNOSIS — Z98.890 OTHER SPECIFIED POSTPROCEDURAL STATES: Chronic | ICD-10-CM

## 2024-05-29 DIAGNOSIS — Z94.0 KIDNEY TRANSPLANT STATUS: Chronic | ICD-10-CM

## 2024-05-29 PROCEDURE — G0463: CPT

## 2024-05-29 PROCEDURE — 99024 POSTOP FOLLOW-UP VISIT: CPT

## 2024-05-29 RX ORDER — LOSARTAN POTASSIUM 25 MG/1
25 TABLET, FILM COATED ORAL
Qty: 90 | Refills: 3 | Status: COMPLETED | COMMUNITY
Start: 2017-09-11 | End: 2024-05-29

## 2024-05-31 ENCOUNTER — APPOINTMENT (OUTPATIENT)
Dept: WOUND CARE | Facility: HOSPITAL | Age: 59
End: 2024-05-31
Payer: MEDICARE

## 2024-05-31 ENCOUNTER — OUTPATIENT (OUTPATIENT)
Dept: OUTPATIENT SERVICES | Facility: HOSPITAL | Age: 59
LOS: 1 days | Discharge: HOME | End: 2024-05-31
Payer: MEDICARE

## 2024-05-31 VITALS
WEIGHT: 315 LBS | BODY MASS INDEX: 41.75 KG/M2 | HEIGHT: 73 IN | RESPIRATION RATE: 18 BRPM | HEART RATE: 80 BPM | OXYGEN SATURATION: 94 % | TEMPERATURE: 98.2 F | DIASTOLIC BLOOD PRESSURE: 83 MMHG | SYSTOLIC BLOOD PRESSURE: 135 MMHG

## 2024-05-31 DIAGNOSIS — Z98.890 OTHER SPECIFIED POSTPROCEDURAL STATES: Chronic | ICD-10-CM

## 2024-05-31 DIAGNOSIS — Z94.0 KIDNEY TRANSPLANT STATUS: Chronic | ICD-10-CM

## 2024-05-31 DIAGNOSIS — Z09 ENCOUNTER FOR FOLLOW-UP EXAMINATION AFTER COMPLETED TREATMENT FOR CONDITIONS OTHER THAN MALIGNANT NEOPLASM: ICD-10-CM

## 2024-05-31 DIAGNOSIS — Z89.421 ACQUIRED ABSENCE OF OTHER RIGHT TOE(S): Chronic | ICD-10-CM

## 2024-05-31 PROCEDURE — G0463: CPT

## 2024-05-31 PROCEDURE — ZZZZZ: CPT

## 2024-05-31 RX ORDER — APIXABAN 5 MG/1
5 TABLET, FILM COATED ORAL
Qty: 180 | Refills: 3 | Status: ACTIVE | COMMUNITY
Start: 2024-05-28

## 2024-05-31 NOTE — REVIEW OF SYSTEMS
[Skin Lesions] : skin lesion [Fever] : no fever [Chills] : no chills [Eye Pain] : no eye pain [Red Eyes] : eyes not red [Earache] : no earache [Nosebleeds] : no nosebleeds [Chest Pain] : no chest pain [Shortness Of Breath] : no shortness of breath [Cough] : no cough [Abdominal Pain] : no abdominal pain [Vomiting] : no vomiting [Diarrhea] : no diarrhea [Joint Swelling] : no joint swelling [Limb Pain] : no limb pain [Skin Wound] : no skin wound [Anxiety] : no anxiety [Easy Bleeding] : no tendency for easy bleeding [FreeTextEntry5] : HTN,HLD [FreeTextEntry7] : 40.9 BMI , morbid obesity  [FreeTextEntry8] : Kidney Transplant  [FreeTextEntry9] : Associated Right Diabetic Charcot foot status post right Achilles tendon lengthening, right posterior tibial tendon lengthening, right peroneus brevis tendon detachment and reattachment with exostectomy of the right fifth metatarsal base and plantar cuboid, and right fifth digit proximal phalanx base resection [de-identified] : Right plantar midfoot ulcer down to fat  [de-identified] : IDDM with neuropathy  [de-identified] : MARILUZ

## 2024-05-31 NOTE — PHYSICAL EXAM
[0] : left 0 [1+] : left 1+ [Skin Ulcer] : ulcer [Alert] : alert [Oriented to Person] : oriented to person [Oriented to Place] : oriented to place [Oriented to Time] : oriented to time [Calm] : calm [Varicose Veins Of Lower Extremities] : not present [] : not present [Purpura] : no purpura  [Petechiae] : no petechiae [Skin Induration] : no induration [de-identified] : A&Ox3, NAD [de-identified] : HTN, HLD [de-identified] : Diabetic Charcot right foot deformity status post right Achilles tendon lengthening, right posterior tibial tendon lengthening, right peroneus brevis tendon detachment and reattachment with exostectomy of the right fifth metatarsal base and plantar cuboid, and right fifth digit proximal phalanx base resection [de-identified] : Right plantar midfoot ulcer down to fat.  Right foot incision site intact with intact sutures, no dehiscence, no purulence, no fluctuance, no proximal streaking, no signs of infection. [de-identified] : Diabetic neuropathy

## 2024-05-31 NOTE — PLAN
[FreeTextEntry1] : Patient examined and evaluated at this time. Will continue with local wound care and offloading at this time. Patient advised regarding the postoperative healing process and all questions answered to satisfaction.  Patient verbalized understanding. Spent 20 minutes for patient care and medical decision making. Patient to follow-up in 1 week.

## 2024-05-31 NOTE — HISTORY OF PRESENT ILLNESS
[FreeTextEntry1] : Patient seen for follow-up for wound to the plantar right midfoot down to fat. Patient denies any pain and has been applying dressings as advised. Patient relates had to be weight bearing for an extended period of time since the patient's mother was unwell.  Denies any other complaints at this time.  5/31/2024 presents for follow-up status post right foot midfoot plantar exostectomy.  Patient relates that he has been doing well and denies any other complaints at this time.  Patient relates that he has been staying off of his right foot and has been utilizing a knee scooter.

## 2024-06-02 PROBLEM — T81.89XD DELAYED SURGICAL WOUND HEALING, SUBSEQUENT ENCOUNTER: Status: ACTIVE | Noted: 2024-06-02

## 2024-06-02 NOTE — REVIEW OF SYSTEMS
[Skin Lesions] : no skin lesions [Skin Wound] : skin wound [Fever] : no fever [Chills] : no chills [Eye Pain] : no eye pain [Red Eyes] : eyes not red [Earache] : no earache [Nosebleeds] : no nosebleeds [Chest Pain] : no chest pain [Shortness Of Breath] : no shortness of breath [Cough] : no cough [Abdominal Pain] : no abdominal pain [Vomiting] : no vomiting [Diarrhea] : no diarrhea [Joint Swelling] : no joint swelling [Limb Pain] : no limb pain [Anxiety] : no anxiety [Easy Bleeding] : no tendency for easy bleeding [FreeTextEntry5] : HTN,HLD [FreeTextEntry7] : 40.9 BMI , morbid obesity  [FreeTextEntry8] : Kidney Transplant  [FreeTextEntry9] : Associated Right Diabetic Charcot foot status post right Achilles tendon lengthening, right posterior tibial tendon lengthening, right peroneus brevis tendon detachment and reattachment with exostectomy of the right fifth metatarsal base and plantar cuboid, and right fifth digit proximal phalanx base resection [de-identified] : Digital nails of b/l hallux, 4th and 5th digits are thickened and elongated, Right foot wound down to subcutaneous tissue  [de-identified] : IDDM with neuropathy  [de-identified] : MARILUZ

## 2024-06-02 NOTE — REASON FOR VISIT
[Follow-Up Visit] : a follow-up visit for [FreeTextEntry2] : Patient presents to follow up on concerns of a wound at the 5th digit of the right foot.

## 2024-06-02 NOTE — REASON FOR VISIT
[Follow-Up Visit] : a follow-up visit for [FreeTextEntry2] : Patient presents to follow up on concerns of a wound at the right foot.

## 2024-06-02 NOTE — ASSESSMENT
[FreeTextEntry1] : Patient seen and evaluated. Discussed etiology and treatment plan. Patient verbalized understanding.  Applied Betadine and dressing to the affected area of the right foot.  Discussed with patient to perform daily foot checks and monitor for any new lesions, open wounds or calluses and to return to clinic at the earliest. Advised patient to continue applying Ciclopirox to the affected nails on a daily basis.  Patient will benefit from custom foot orthotics for arch support and to stabilize and assist with gait. Patient agreed to conservative treatment. Patient is high risk for developing infection from pressure calluses and patient has history of amputations.  RTC if symptoms persist/worsen.  Return to Wound Care Center in 1 week for follow up to ensure that wound healing is evaluated.  Spent 20 minutes for patient care and medical decision making.

## 2024-06-02 NOTE — ASSESSMENT
[FreeTextEntry1] : Patient seen and evaluated. Discussed etiology and treatment plan. Patient verbalized understanding. Patient advised to continue care for the irght foot wound at the [facility]  Will need further work up and evaluation.  Return to Wound Care Center in 1 week for follow up to ensure that wound healing is evaluated.  RTC for diabetic foot check in 3 months  Spent 20 minutes for patient care and medical decision making.

## 2024-06-02 NOTE — PHYSICAL EXAM
[General Appearance - Alert] : alert [General Appearance - In No Acute Distress] : in no acute distress [2+] : left foot dorsalis pedis 2+ [Oriented To Time, Place, And Person] : oriented to person, place, and time [Affect] : the affect was normal [Ankle Swelling (On Exam)] : not present [Varicose Veins Of Lower Extremities] : not present [] : not present [de-identified] : 4/5 strength in all quadrants bilaterally, s/p right posterior tibial tendon lengthening, achilles tendon lengthening, 5th metatarsal base exostectomy with peroneus longus tendon detachment and reattachment, s/p bilateral 2nd and 3rd digit distal amputations. [FreeTextEntry1] : Loss of light touch sensation bilaterally

## 2024-06-02 NOTE — HISTORY OF PRESENT ILLNESS
[FreeTextEntry1] : Patient is 58 year old male presents for follow-up status post right foot midfoot plantar exostectomy. Patient relates the right foot dressing was noted with bloody drainage and has been started on Eliquis per cardiologist for new onset Afib. Patient relates that he has been staying off of his right foot and has been utilizing a knee scooter.

## 2024-06-02 NOTE — VITALS
[] : No [de-identified] : 0/10 [FreeTextEntry5] : amoxicillin-clavulanate 875 mg-125 mg oral tablet: 1 tab(s) orally 2 times a  day

## 2024-06-02 NOTE — PHYSICAL EXAM
[2 x 2] : 2 x 2  [4 x 4] : 4 x 4  [0] : left 0 [1+] : left 1+ [Skin Ulcer] : ulcer [Alert] : alert [Oriented to Person] : oriented to person [Oriented to Place] : oriented to place [Oriented to Time] : oriented to time [Calm] : calm [Varicose Veins Of Lower Extremities] : not present [] : not present [Purpura] : no purpura  [Petechiae] : no petechiae [Skin Induration] : no induration [de-identified] : A&Ox3, NAD [de-identified] : HTN, HLD [de-identified] : Diabetic Charcot right foot deformity status post right Achilles tendon lengthening, right posterior tibial tendon lengthening, right peroneus brevis tendon detachment and reattachment with exostectomy of the right fifth metatarsal base and plantar cuboid, and right fifth digit proximal phalanx base resection [de-identified] : Right plantar midfoot ulcer down to fat with no signs of infection. right lateral foot surgical site with sutures intact, no dehiscence, mild periwound edema and erythema  [de-identified] : Diabetic neuropathy  [de-identified] : All sutures in place [FreeTextEntry1] : Plantar Mid foot [FreeTextEntry2] : 0.7 [FreeTextEntry3] : 1.1 [FreeTextEntry4] : 0.3 [de-identified] : Sanguinous  [de-identified] : Patient expressed comport post application, Circ/Neuromuscular function WNL [de-identified] : Silver Alginate [de-identified] :  Mechanically cleansed with NS 0.9%, Sterile Gauze Kerlix [FreeTextEntry7] : S/p right foot exostectomy 5/21/24 [FreeTextEntry8] : 1.8 [FreeTextEntry9] : 0.4 [de-identified] : 0.1 suture line [de-identified] : moderate sanguineous [de-identified] : none [de-identified] : surgical [de-identified] : none [de-identified] : macerated [de-identified] : none [de-identified] : none [de-identified] : unable to visualize wound base.  [de-identified] : Alginate Ag  [de-identified] : Mechanically cleansed with sterile gauze and normal saline 0.9% Dry Dressing [TWNoteComboBox1] : Right [TWNoteComboBox4] : Moderate [TWNoteComboBox5] : No [TWNoteComboBox6] : Surgical [de-identified] : No [de-identified] : Macerated [de-identified] : None [de-identified] : None [de-identified] : 100% [de-identified] : No [de-identified] : Ace wraps [de-identified] : Every other day [de-identified] : Primary Dressing [de-identified] : Macerated [de-identified] : 3x Weekly [de-identified] : Primary Dressing

## 2024-06-02 NOTE — HISTORY OF PRESENT ILLNESS
[FreeTextEntry1] : 58-year-old male presents to follow up for wound to the right midfoot down to fat. Patient relates noticed some bloody drainage and was concerne. Patient has been applying betadine and performing dressing changes as advised.

## 2024-06-02 NOTE — PLAN
[FreeTextEntry1] : Patient examined and evaluated at this time. Continue with local wound care and offloading. c/w non weight bearing to the right foot at this time Spent 20 minutes for patient care and medical decision making.

## 2024-06-02 NOTE — REVIEW OF SYSTEMS
[Skin Lesions] : skin lesion [Fever] : no fever [Chills] : no chills [Eye Pain] : no eye pain [Red Eyes] : eyes not red [Earache] : no earache [Nosebleeds] : no nosebleeds [Chest Pain] : no chest pain [Shortness Of Breath] : no shortness of breath [Cough] : no cough [Abdominal Pain] : no abdominal pain [Vomiting] : no vomiting [Diarrhea] : no diarrhea [Joint Swelling] : no joint swelling [Limb Pain] : no limb pain [Skin Wound] : no skin wound [Anxiety] : no anxiety [Easy Bleeding] : no tendency for easy bleeding [FreeTextEntry5] : HTN,HLD [FreeTextEntry7] : 40.9 BMI , morbid obesity  [FreeTextEntry9] : Associated Right Diabetic Charcot foot status post right Achilles tendon lengthening, right posterior tibial tendon lengthening, right peroneus brevis tendon detachment and reattachment with exostectomy of the right fifth metatarsal base and plantar cuboid, and right fifth digit proximal phalanx base resection [FreeTextEntry8] : Kidney Transplant  [de-identified] : Right plantar midfoot ulcer down to fat ,right lateral surgical site with sutures intact , no dehiscence     [id number]              ] [de-identified] : IDDM with neuropathy  [de-identified] : MARILUZ

## 2024-06-02 NOTE — ASSESSMENT
[Verbal] : Verbal [Demo] : Demo [Patient] : Patient [Good - alert, interested, motivated] : Good - alert, interested, motivated [Verbalizes knowledge/Understanding] : Verbalizes knowledge/understanding [Dressing changes] : dressing changes [Foot Care] : foot care [Skin Care] : skin care [Pressure relief] : pressure relief [Signs and symptoms of infection] : sign and symptoms of infection [Nutrition] : nutrition [How and When to Call] : how and when to call [Pain Management] : pain management [Off-loading] : off-loading [Patient responsibility to plan of care] : patient responsibility to plan of care [Glycemic Control] : glycemic control [Other: ____] : [unfilled] [Stable] : stable [Home] : Home [Ambulatory] : Ambulatory [Not Applicable - Long Term Care/Home Health Agency] : Long Term Care/Home Health Agency: Not Applicable [] : No [FreeTextEntry2] : Infection prevention Localized wound care Promote optimal skin integrity  Maintain acceptable level of pain with use of pharmacological and nonpharmacological interventions Offloading / Pressure relief  Daily foot care / monitoring  Collagen matrix  [FreeTextEntry4] : S/P IV Zosyn Patient to return 1 X this week for dressing change.  Assessment in 1 week [FreeTextEntry3] : Surgical wound with maceration S/P Hospitalization 5/20/24 - 5/25/24

## 2024-06-02 NOTE — REVIEW OF SYSTEMS
[Skin Wound] : skin wound [Fever] : no fever [Chills] : no chills [Eye Pain] : no eye pain [Red Eyes] : eyes not red [Earache] : no earache [Nosebleeds] : no nosebleeds [Chest Pain] : no chest pain [Shortness Of Breath] : no shortness of breath [Cough] : no cough [Abdominal Pain] : no abdominal pain [Vomiting] : no vomiting [Diarrhea] : no diarrhea [Joint Swelling] : no joint swelling [Limb Pain] : no limb pain [Skin Lesions] : no skin lesions [Anxiety] : no anxiety [Easy Bleeding] : no tendency for easy bleeding [FreeTextEntry5] : HTN,HLD [FreeTextEntry7] : 40.9 BMI , morbid obesity  [FreeTextEntry8] : Kidney Transplant  [FreeTextEntry9] : Associated Right Diabetic Charcot foot status post right Achilles tendon lengthening, right posterior tibial tendon lengthening, right peroneus brevis tendon detachment and reattachment with exostectomy of the right fifth metatarsal base and plantar cuboid, and right fifth digit proximal phalanx base resection [de-identified] : Right foot wound down to subcutaneous tissue  and fat  [de-identified] : IDDM with neuropathy  [de-identified] : MARILUZ

## 2024-06-02 NOTE — HISTORY OF PRESENT ILLNESS
[FreeTextEntry1] : 58-year-old male presents to follow up on concerns of a wound right plantar midfoot down to fat. Patient states that he has been applying Betadine to the affected area on a daily basis as instructed in the last visit. Patient does not recall any recent trauma to right foot. Patient adds that he has still been experiencing increased swelling at the right foot. Patient has history of kidney transplant. Patient mentions that he has been regularly consulting his cardiologist, endocrinologist, and nephrologist.

## 2024-06-02 NOTE — PHYSICAL EXAM
[General Appearance - Alert] : alert [General Appearance - In No Acute Distress] : in no acute distress [2+] : left foot dorsalis pedis 2+ [Oriented To Time, Place, And Person] : oriented to person, place, and time [Affect] : the affect was normal [Ankle Swelling (On Exam)] : not present [Varicose Veins Of Lower Extremities] : not present [] : not present [de-identified] : 4/5 strength in all quadrants bilaterally, s/p right posterior tibial tendon lengthening, achilles tendon lengthening, 5th metatarsal base exostectomy with peroneus longus tendon detachment and reattachment, s/p bilateral 2nd and 3rd digit distal amputations. [FreeTextEntry1] : Loss of light touch sensation bilaterally

## 2024-06-03 ENCOUNTER — APPOINTMENT (OUTPATIENT)
Dept: WOUND CARE | Facility: HOSPITAL | Age: 59
End: 2024-06-03
Payer: MEDICARE

## 2024-06-03 ENCOUNTER — OUTPATIENT (OUTPATIENT)
Dept: OUTPATIENT SERVICES | Facility: HOSPITAL | Age: 59
LOS: 1 days | Discharge: HOME | End: 2024-06-03
Payer: MEDICARE

## 2024-06-03 VITALS
BODY MASS INDEX: 41.75 KG/M2 | HEART RATE: 75 BPM | WEIGHT: 315 LBS | SYSTOLIC BLOOD PRESSURE: 125 MMHG | TEMPERATURE: 98.3 F | RESPIRATION RATE: 18 BRPM | HEIGHT: 73 IN | OXYGEN SATURATION: 93 % | DIASTOLIC BLOOD PRESSURE: 78 MMHG

## 2024-06-03 DIAGNOSIS — Z80.8 FAMILY HISTORY OF MALIGNANT NEOPLASM OF OTHER ORGANS OR SYSTEMS: ICD-10-CM

## 2024-06-03 DIAGNOSIS — G47.33 OBSTRUCTIVE SLEEP APNEA (ADULT) (PEDIATRIC): ICD-10-CM

## 2024-06-03 DIAGNOSIS — Z87.81 PERSONAL HISTORY OF (HEALED) TRAUMATIC FRACTURE: ICD-10-CM

## 2024-06-03 DIAGNOSIS — Z86.16 PERSONAL HISTORY OF COVID-19: ICD-10-CM

## 2024-06-03 DIAGNOSIS — Z80.3 FAMILY HISTORY OF MALIGNANT NEOPLASM OF BREAST: ICD-10-CM

## 2024-06-03 DIAGNOSIS — E11.621 TYPE 2 DIABETES MELLITUS WITH FOOT ULCER: ICD-10-CM

## 2024-06-03 DIAGNOSIS — Z94.0 KIDNEY TRANSPLANT STATUS: Chronic | ICD-10-CM

## 2024-06-03 DIAGNOSIS — Z89.421 ACQUIRED ABSENCE OF OTHER RIGHT TOE(S): ICD-10-CM

## 2024-06-03 DIAGNOSIS — Z94.0 KIDNEY TRANSPLANT STATUS: ICD-10-CM

## 2024-06-03 DIAGNOSIS — Z89.422 ACQUIRED ABSENCE OF OTHER LEFT TOE(S): ICD-10-CM

## 2024-06-03 DIAGNOSIS — Z79.4 LONG TERM (CURRENT) USE OF INSULIN: ICD-10-CM

## 2024-06-03 DIAGNOSIS — L97.412 NON-PRESSURE CHRONIC ULCER OF RIGHT HEEL AND MIDFOOT WITH FAT LAYER EXPOSED: ICD-10-CM
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
## 2024-06-03 DIAGNOSIS — Z79.01 LONG TERM (CURRENT) USE OF ANTICOAGULANTS: ICD-10-CM

## 2024-06-03 DIAGNOSIS — E78.5 HYPERLIPIDEMIA, UNSPECIFIED: ICD-10-CM

## 2024-06-03 DIAGNOSIS — T81.89XD OTHER COMPLICATIONS OF PROCEDURES, NOT ELSEWHERE CLASSIFIED, SUBSEQUENT ENCOUNTER: ICD-10-CM
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
## 2024-06-03 DIAGNOSIS — E11.610 TYPE 2 DIABETES MELLITUS WITH DIABETIC NEUROPATHIC ARTHROPATHY: ICD-10-CM

## 2024-06-03 DIAGNOSIS — Z79.899 OTHER LONG TERM (CURRENT) DRUG THERAPY: ICD-10-CM

## 2024-06-03 DIAGNOSIS — Z09 ENCOUNTER FOR FOLLOW-UP EXAMINATION AFTER COMPLETED TREATMENT FOR CONDITIONS OTHER THAN MALIGNANT NEOPLASM: ICD-10-CM

## 2024-06-03 DIAGNOSIS — Z86.73 PERSONAL HISTORY OF TRANSIENT ISCHEMIC ATTACK (TIA), AND CEREBRAL INFARCTION WITHOUT RESIDUAL DEFICITS: ICD-10-CM

## 2024-06-03 DIAGNOSIS — Z85.828 PERSONAL HISTORY OF OTHER MALIGNANT NEOPLASM OF SKIN: ICD-10-CM

## 2024-06-03 DIAGNOSIS — Z89.421 ACQUIRED ABSENCE OF OTHER RIGHT TOE(S): Chronic | ICD-10-CM

## 2024-06-03 DIAGNOSIS — Z83.3 FAMILY HISTORY OF DIABETES MELLITUS: ICD-10-CM

## 2024-06-03 DIAGNOSIS — Z86.718 PERSONAL HISTORY OF OTHER VENOUS THROMBOSIS AND EMBOLISM: ICD-10-CM

## 2024-06-03 DIAGNOSIS — Z98.890 OTHER SPECIFIED POSTPROCEDURAL STATES: Chronic | ICD-10-CM

## 2024-06-03 PROCEDURE — G0463: CPT

## 2024-06-03 PROCEDURE — ZZZZZ: CPT

## 2024-06-04 DIAGNOSIS — T81.89XD OTHER COMPLICATIONS OF PROCEDURES, NOT ELSEWHERE CLASSIFIED, SUBSEQUENT ENCOUNTER: ICD-10-CM

## 2024-06-04 DIAGNOSIS — L97.412 NON-PRESSURE CHRONIC ULCER OF RIGHT HEEL AND MIDFOOT WITH FAT LAYER EXPOSED: ICD-10-CM
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
## 2024-06-04 DIAGNOSIS — E11.621 TYPE 2 DIABETES MELLITUS WITH FOOT ULCER: ICD-10-CM
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
## 2024-06-05 ENCOUNTER — OUTPATIENT (OUTPATIENT)
Dept: OUTPATIENT SERVICES | Facility: HOSPITAL | Age: 59
LOS: 1 days | Discharge: HOME | End: 2024-06-05
Payer: MEDICARE

## 2024-06-05 ENCOUNTER — APPOINTMENT (OUTPATIENT)
Dept: WOUND CARE | Facility: HOSPITAL | Age: 59
End: 2024-06-05

## 2024-06-05 VITALS
TEMPERATURE: 98.8 F | SYSTOLIC BLOOD PRESSURE: 154 MMHG | HEIGHT: 73 IN | WEIGHT: 315 LBS | HEART RATE: 84 BPM | DIASTOLIC BLOOD PRESSURE: 81 MMHG | RESPIRATION RATE: 18 BRPM | BODY MASS INDEX: 41.75 KG/M2 | OXYGEN SATURATION: 91 %

## 2024-06-05 DIAGNOSIS — L97.412 NON-PRESSURE CHRONIC ULCER OF RIGHT HEEL AND MIDFOOT WITH FAT LAYER EXPOSED: ICD-10-CM

## 2024-06-05 DIAGNOSIS — Z87.81 PERSONAL HISTORY OF (HEALED) TRAUMATIC FRACTURE: ICD-10-CM

## 2024-06-05 DIAGNOSIS — N18.6 END STAGE RENAL DISEASE: Chronic | ICD-10-CM

## 2024-06-05 DIAGNOSIS — Z80.3 FAMILY HISTORY OF MALIGNANT NEOPLASM OF BREAST: ICD-10-CM

## 2024-06-05 DIAGNOSIS — Z98.890 OTHER SPECIFIED POSTPROCEDURAL STATES: Chronic | ICD-10-CM

## 2024-06-05 DIAGNOSIS — Z80.8 FAMILY HISTORY OF MALIGNANT NEOPLASM OF OTHER ORGANS OR SYSTEMS: ICD-10-CM

## 2024-06-05 DIAGNOSIS — Z79.899 OTHER LONG TERM (CURRENT) DRUG THERAPY: ICD-10-CM

## 2024-06-05 DIAGNOSIS — Z94.0 KIDNEY TRANSPLANT STATUS: ICD-10-CM

## 2024-06-05 DIAGNOSIS — Z86.16 PERSONAL HISTORY OF COVID-19: ICD-10-CM

## 2024-06-05 DIAGNOSIS — T81.89XD OTHER COMPLICATIONS OF PROCEDURES, NOT ELSEWHERE CLASSIFIED, SUBSEQUENT ENCOUNTER: ICD-10-CM

## 2024-06-05 DIAGNOSIS — Z85.828 PERSONAL HISTORY OF OTHER MALIGNANT NEOPLASM OF SKIN: ICD-10-CM

## 2024-06-05 DIAGNOSIS — Z79.4 LONG TERM (CURRENT) USE OF INSULIN: ICD-10-CM

## 2024-06-05 DIAGNOSIS — Z79.01 LONG TERM (CURRENT) USE OF ANTICOAGULANTS: ICD-10-CM

## 2024-06-05 DIAGNOSIS — Z89.422 ACQUIRED ABSENCE OF OTHER LEFT TOE(S): ICD-10-CM

## 2024-06-05 DIAGNOSIS — E11.610 TYPE 2 DIABETES MELLITUS WITH DIABETIC NEUROPATHIC ARTHROPATHY: ICD-10-CM

## 2024-06-05 DIAGNOSIS — E11.621 TYPE 2 DIABETES MELLITUS WITH FOOT ULCER: ICD-10-CM
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
## 2024-06-05 DIAGNOSIS — Z86.73 PERSONAL HISTORY OF TRANSIENT ISCHEMIC ATTACK (TIA), AND CEREBRAL INFARCTION WITHOUT RESIDUAL DEFICITS: ICD-10-CM

## 2024-06-05 DIAGNOSIS — Z89.421 ACQUIRED ABSENCE OF OTHER RIGHT TOE(S): ICD-10-CM

## 2024-06-05 DIAGNOSIS — Z89.421 ACQUIRED ABSENCE OF OTHER RIGHT TOE(S): Chronic | ICD-10-CM

## 2024-06-05 DIAGNOSIS — Z94.0 KIDNEY TRANSPLANT STATUS: Chronic | ICD-10-CM

## 2024-06-05 DIAGNOSIS — Z83.3 FAMILY HISTORY OF DIABETES MELLITUS: ICD-10-CM

## 2024-06-05 DIAGNOSIS — Z86.718 PERSONAL HISTORY OF OTHER VENOUS THROMBOSIS AND EMBOLISM: ICD-10-CM

## 2024-06-05 DIAGNOSIS — E78.5 HYPERLIPIDEMIA, UNSPECIFIED: ICD-10-CM

## 2024-06-05 DIAGNOSIS — G47.33 OBSTRUCTIVE SLEEP APNEA (ADULT) (PEDIATRIC): ICD-10-CM

## 2024-06-05 DIAGNOSIS — Z09 ENCOUNTER FOR FOLLOW-UP EXAMINATION AFTER COMPLETED TREATMENT FOR CONDITIONS OTHER THAN MALIGNANT NEOPLASM: ICD-10-CM
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
## 2024-06-05 PROCEDURE — G0463: CPT

## 2024-06-05 PROCEDURE — 99024 POSTOP FOLLOW-UP VISIT: CPT

## 2024-06-05 NOTE — HISTORY OF PRESENT ILLNESS
[FreeTextEntry1] : Patient is 58 year old male presents for follow-up status post right foot midfoot plantar exostectomy. Patient relates the right foot dressing was noted with bloody drainage and has been started on Eliquis per cardiologist for new onset Afib. Patient relates that he has been staying off of his right foot and has been utilizing a knee scooter.  6/5/2024 patient seen for follow-up status post right midfoot plantar exostectomy.  Patient relates that the dressings have been changed as advised since the last encounter.  Denies any other complaints at this time.

## 2024-06-05 NOTE — PHYSICAL EXAM
[0] : left 0 [1+] : left 1+ [Skin Ulcer] : ulcer [Alert] : alert [Oriented to Person] : oriented to person [Oriented to Place] : oriented to place [Oriented to Time] : oriented to time [Calm] : calm [Varicose Veins Of Lower Extremities] : not present [] : not present [Purpura] : no purpura  [Petechiae] : no petechiae [Skin Induration] : no induration [de-identified] : A&Ox3, NAD [de-identified] : HTN, HLD [de-identified] : Diabetic Charcot right foot deformity status post right Achilles tendon lengthening, right posterior tibial tendon lengthening, right peroneus brevis tendon detachment and reattachment with exostectomy of the right fifth metatarsal base and plantar cuboid, and right fifth digit proximal phalanx base resection [de-identified] : Right plantar midfoot ulcer down to fat with no signs of infection. right lateral foot surgical site with central dehiscence, mild periwound edema and erythema  [de-identified] : Diabetic neuropathy  [4 x 4] : 4 x 4  [Abdominal Pad] : Abdominal Pad [FreeTextEntry1] : plantar midfoot [FreeTextEntry2] : 0.7 [FreeTextEntry3] : 2.0 [FreeTextEntry4] : 0.3 [de-identified] : serosanguinous [de-identified] : silver alginate [de-identified] : Mechanically cleansed with normal saline. All sutures removed by Dr. Catherine.  Wrapped with kerlix, ace wrap and stockinette. [FreeTextEntry7] : plantar [FreeTextEntry8] : 0.4 [FreeTextEntry9] : 0.8 [de-identified] : 0.2 [de-identified] : serosanguinous [de-identified] : silver alginate [de-identified] : Mechanically cleansed with normal saline. Wrapped with kerlix, stockinette, and ace wrap.  [TWNoteComboBox1] : Right [TWNoteComboBox4] : Small [TWNoteComboBox5] : No [TWNoteComboBox6] : Surgical [de-identified] : No [de-identified] : Macerated [de-identified] : None [de-identified] : None [de-identified] : 100% [de-identified] : No [de-identified] : Ace wraps [de-identified] : 3x Weekly [de-identified] : Primary Dressing [TWNoteComboBox9] : Right [de-identified] : Small [de-identified] : No [de-identified] : Other [de-identified] : No [de-identified] : Macerated [de-identified] : None [de-identified] : 100% [de-identified] : 100% [de-identified] : No [de-identified] : Ace wraps [de-identified] : Every other day [de-identified] : Primary Dressing

## 2024-06-05 NOTE — ASSESSMENT
[Verbal] : Verbal [Written] : Written [Patient] : Patient [Good - alert, interested, motivated] : Good - alert, interested, motivated [Verbalizes knowledge/Understanding] : Verbalizes knowledge/understanding [Skin Care] : skin care [Signs and symptoms of infection] : sign and symptoms of infection [How and When to Call] : how and when to call [Patient responsibility to plan of care] : patient responsibility to plan of care [] : Yes [FreeTextEntry2] : 1. Maintain skin integrity. 2. Promote healing. 3. Prevent infection. [FreeTextEntry4] : Patient will return to wound care center on friday for dressing change.  [Stable] : stable [Home] : Home [Walker] : Walker

## 2024-06-05 NOTE — VITALS
[Pain related to present condition?] : The patient's  pain is not related to present condition. [] : No [de-identified] : 0/10

## 2024-06-05 NOTE — REVIEW OF SYSTEMS
[Skin Lesions] : skin lesion [Fever] : no fever [Chills] : no chills [Eye Pain] : no eye pain [Red Eyes] : eyes not red [Earache] : no earache [Nosebleeds] : no nosebleeds [Chest Pain] : no chest pain [Shortness Of Breath] : no shortness of breath [Cough] : no cough [Abdominal Pain] : no abdominal pain [Vomiting] : no vomiting [Diarrhea] : no diarrhea [Joint Swelling] : no joint swelling [Limb Pain] : no limb pain [Skin Wound] : no skin wound [Anxiety] : no anxiety [Easy Bleeding] : no tendency for easy bleeding [FreeTextEntry5] : HTN,HLD [FreeTextEntry7] : 40.9 BMI , morbid obesity  [FreeTextEntry8] : Kidney Transplant  [FreeTextEntry9] : Associated Right Diabetic Charcot foot status post right Achilles tendon lengthening, right posterior tibial tendon lengthening, right peroneus brevis tendon detachment and reattachment with exostectomy of the right fifth metatarsal base and plantar cuboid, and right fifth digit proximal phalanx base resection [de-identified] : Right plantar midfoot ulcer down to fat ,right lateral surgical site with sutures intact , no dehiscence     erbdvv gkl              ] [de-identified] : IDDM with neuropathy  [de-identified] : MARILUZ

## 2024-06-07 ENCOUNTER — OUTPATIENT (OUTPATIENT)
Dept: OUTPATIENT SERVICES | Facility: HOSPITAL | Age: 59
LOS: 1 days | Discharge: HOME | End: 2024-06-07
Payer: MEDICARE

## 2024-06-07 ENCOUNTER — APPOINTMENT (OUTPATIENT)
Dept: WOUND CARE | Facility: HOSPITAL | Age: 59
End: 2024-06-07
Payer: MEDICARE

## 2024-06-07 VITALS
SYSTOLIC BLOOD PRESSURE: 122 MMHG | WEIGHT: 315 LBS | BODY MASS INDEX: 41.75 KG/M2 | RESPIRATION RATE: 18 BRPM | DIASTOLIC BLOOD PRESSURE: 75 MMHG | OXYGEN SATURATION: 95 % | HEIGHT: 73 IN | HEART RATE: 67 BPM | TEMPERATURE: 99.1 F

## 2024-06-07 DIAGNOSIS — Z89.421 ACQUIRED ABSENCE OF OTHER RIGHT TOE(S): Chronic | ICD-10-CM

## 2024-06-07 DIAGNOSIS — T81.89XD OTHER COMPLICATIONS OF PROCEDURES, NOT ELSEWHERE CLASSIFIED, SUBSEQUENT ENCOUNTER: ICD-10-CM

## 2024-06-07 DIAGNOSIS — Z94.0 KIDNEY TRANSPLANT STATUS: Chronic | ICD-10-CM

## 2024-06-07 DIAGNOSIS — Z98.890 OTHER SPECIFIED POSTPROCEDURAL STATES: Chronic | ICD-10-CM
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
## 2024-06-07 DIAGNOSIS — Z09 ENCOUNTER FOR FOLLOW-UP EXAMINATION AFTER COMPLETED TREATMENT FOR CONDITIONS OTHER THAN MALIGNANT NEOPLASM: ICD-10-CM

## 2024-06-07 DIAGNOSIS — N18.6 END STAGE RENAL DISEASE: Chronic | ICD-10-CM

## 2024-06-07 DIAGNOSIS — L97.412 NON-PRESSURE CHRONIC ULCER OF RIGHT HEEL AND MIDFOOT WITH FAT LAYER EXPOSED: ICD-10-CM
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
## 2024-06-07 DIAGNOSIS — E11.621 TYPE 2 DIABETES MELLITUS WITH FOOT ULCER: ICD-10-CM

## 2024-06-07 PROCEDURE — G0463: CPT

## 2024-06-07 PROCEDURE — ZZZZZ: CPT

## 2024-06-12 ENCOUNTER — OUTPATIENT (OUTPATIENT)
Dept: OUTPATIENT SERVICES | Facility: HOSPITAL | Age: 59
LOS: 1 days | Discharge: HOME | End: 2024-06-12
Payer: MEDICARE

## 2024-06-12 ENCOUNTER — APPOINTMENT (OUTPATIENT)
Dept: WOUND CARE | Facility: HOSPITAL | Age: 59
End: 2024-06-12
Payer: MEDICARE

## 2024-06-12 VITALS
OXYGEN SATURATION: 95 % | TEMPERATURE: 98.2 F | HEART RATE: 76 BPM | BODY MASS INDEX: 41.75 KG/M2 | HEIGHT: 73 IN | WEIGHT: 315 LBS | SYSTOLIC BLOOD PRESSURE: 145 MMHG | RESPIRATION RATE: 15 BRPM | DIASTOLIC BLOOD PRESSURE: 86 MMHG

## 2024-06-12 DIAGNOSIS — Z94.0 KIDNEY TRANSPLANT STATUS: Chronic | ICD-10-CM

## 2024-06-12 DIAGNOSIS — Z89.421 ACQUIRED ABSENCE OF OTHER RIGHT TOE(S): Chronic | ICD-10-CM

## 2024-06-12 DIAGNOSIS — N18.6 END STAGE RENAL DISEASE: Chronic | ICD-10-CM

## 2024-06-12 DIAGNOSIS — Z09 ENCOUNTER FOR FOLLOW-UP EXAMINATION AFTER COMPLETED TREATMENT FOR CONDITIONS OTHER THAN MALIGNANT NEOPLASM: ICD-10-CM

## 2024-06-12 PROCEDURE — G0463: CPT

## 2024-06-12 PROCEDURE — 99213 OFFICE O/P EST LOW 20 MIN: CPT

## 2024-06-13 NOTE — VITALS
[Pain related to present condition?] : The patient's  pain is not related to present condition. [] : No [de-identified] : 0/10

## 2024-06-13 NOTE — PHYSICAL EXAM
[4 x 4] : 4 x 4  [Abdominal Pad] : Abdominal Pad [0] : left 0 [1+] : left 1+ [Varicose Veins Of Lower Extremities] : not present [] : not present [Purpura] : no purpura  [Petechiae] : no petechiae [Skin Ulcer] : ulcer [Skin Induration] : no induration [Alert] : alert [Oriented to Person] : oriented to person [Oriented to Place] : oriented to place [Oriented to Time] : oriented to time [Calm] : calm [de-identified] : A&Ox3, NAD [de-identified] : HTN, HLD [de-identified] : Diabetic Charcot right foot deformity status post right Achilles tendon lengthening, right posterior tibial tendon lengthening, right peroneus brevis tendon detachment and reattachment with exostectomy of the right fifth metatarsal base and plantar cuboid, and right fifth digit proximal phalanx base resection [de-identified] : Right plantar midfoot ulcer healed. right lateral foot surgical site with central dehiscence and open wound down to skin, subcutaneous tissue, fat, no periwound erythema  [de-identified] : Diabetic neuropathy  [FreeTextEntry1] : plantar midfoot [FreeTextEntry2] : 3.6 [FreeTextEntry3] : 2.0 [FreeTextEntry4] : 0.3 [de-identified] : serosanguinous [de-identified] : Calloused [de-identified] : Patient expressed comfort post ACE application. Circulation/neuromuscular functions WNL post application. [de-identified] : Soaked betadine gauze [de-identified] : Mechanically cleansed with sterile gauze and 0.9% normal saline. Kerlix ACE Stockingette  [FreeTextEntry7] : Right Plantar Foot - HEALED [de-identified] : No Product [TWNoteComboBox1] : Right [TWNoteComboBox4] : Small [TWNoteComboBox5] : No [TWNoteComboBox6] : Surgical [de-identified] : other [de-identified] : No [de-identified] : None [de-identified] : None [de-identified] : 100% [de-identified] : No [de-identified] : Ace wraps [de-identified] : 3x Weekly [de-identified] : Primary Dressing [de-identified] : None [de-identified] : Other

## 2024-06-13 NOTE — REVIEW OF SYSTEMS
[Fever] : no fever [Chills] : no chills [Eye Pain] : no eye pain [Red Eyes] : eyes not red [Earache] : no earache [Nosebleeds] : no nosebleeds [Chest Pain] : no chest pain [Shortness Of Breath] : no shortness of breath [Cough] : no cough [Abdominal Pain] : no abdominal pain [Vomiting] : no vomiting [Diarrhea] : no diarrhea [Joint Swelling] : no joint swelling [Limb Pain] : no limb pain [Skin Lesions] : skin lesion [Skin Wound] : no skin wound [Anxiety] : no anxiety [Easy Bleeding] : no tendency for easy bleeding [FreeTextEntry5] : HTN,HLD [FreeTextEntry7] : 40.9 BMI , morbid obesity  [FreeTextEntry8] : Kidney Transplant  [FreeTextEntry9] : Associated Right Diabetic Charcot foot status post right Achilles tendon lengthening, right posterior tibial tendon lengthening, right peroneus brevis tendon detachment and reattachment with exostectomy of the right fifth metatarsal base and plantar cuboid, and right fifth digit proximal phalanx base resection [de-identified] : Right plantar midfoot ulcer down to fat ,right lateral surgical site with sutures intact , no dehiscence     erbdvv gkl              ] [de-identified] : IDDM with neuropathy  [de-identified] : MARILUZ

## 2024-06-13 NOTE — HISTORY OF PRESENT ILLNESS
[FreeTextEntry1] : Patient is 58 year old male presents for follow-up status post right foot midfoot plantar exostectomy. Patient relates the right foot dressing was noted with bloody drainage and has been started on Eliquis per cardiologist for new onset Afib. Patient relates that he has been staying off of his right foot and has been utilizing a knee scooter.  6/13/2024 patient seen for follow-up status post right midfoot plantar exostectomy.  Patient relates that the dressings have been changed as advised since the last encounter.  Denies any other complaints at this time.

## 2024-06-13 NOTE — ASSESSMENT
[Verbal] : Verbal [Demo] : Demo [Patient] : Patient [Good - alert, interested, motivated] : Good - alert, interested, motivated [Verbalizes knowledge/Understanding] : Verbalizes knowledge/understanding [Skin Care] : skin care [Signs and symptoms of infection] : sign and symptoms of infection [How and When to Call] : how and when to call [Patient responsibility to plan of care] : patient responsibility to plan of care [] : Yes [Stable] : stable [Home] : Home [Other: ____] : [unfilled] [Not Applicable - Long Term Care/Home Health Agency] : Long Term Care/Home Health Agency: Not Applicable [Dressing changes] : dressing changes [FreeTextEntry2] : 1. Maintain skin integrity. 2. Promote healing. 3. Prevent infection. [FreeTextEntry4] : DPM assessed wound and shaved callous on right lateral wound, used silver nitrate to cauterize wound. Advised to change dressing on lateral wound to betadine soaked gauzed, wrapped up w/ kerlix and ACE. Confirmed wound on plantar foot is healed. Patient comes to [facility] for dressing changes and every week for assessment. F/U Friday for dressing change.

## 2024-06-14 ENCOUNTER — OUTPATIENT (OUTPATIENT)
Dept: OUTPATIENT SERVICES | Facility: HOSPITAL | Age: 59
LOS: 1 days | Discharge: HOME | End: 2024-06-14
Payer: MEDICARE

## 2024-06-14 ENCOUNTER — APPOINTMENT (OUTPATIENT)
Dept: WOUND CARE | Facility: HOSPITAL | Age: 59
End: 2024-06-14
Payer: MEDICARE

## 2024-06-14 VITALS
WEIGHT: 315 LBS | DIASTOLIC BLOOD PRESSURE: 73 MMHG | BODY MASS INDEX: 41.75 KG/M2 | HEART RATE: 55 BPM | OXYGEN SATURATION: 96 % | RESPIRATION RATE: 16 BRPM | HEIGHT: 73 IN | SYSTOLIC BLOOD PRESSURE: 120 MMHG | TEMPERATURE: 98.5 F

## 2024-06-14 DIAGNOSIS — Z09 ENCOUNTER FOR FOLLOW-UP EXAMINATION AFTER COMPLETED TREATMENT FOR CONDITIONS OTHER THAN MALIGNANT NEOPLASM: ICD-10-CM

## 2024-06-14 DIAGNOSIS — Z94.0 KIDNEY TRANSPLANT STATUS: Chronic | ICD-10-CM

## 2024-06-14 DIAGNOSIS — Z98.890 OTHER SPECIFIED POSTPROCEDURAL STATES: Chronic | ICD-10-CM

## 2024-06-14 PROCEDURE — G0463: CPT

## 2024-06-14 PROCEDURE — ZZZZZ: CPT

## 2024-06-15 DIAGNOSIS — G47.33 OBSTRUCTIVE SLEEP APNEA (ADULT) (PEDIATRIC): ICD-10-CM

## 2024-06-15 DIAGNOSIS — Z80.8 FAMILY HISTORY OF MALIGNANT NEOPLASM OF OTHER ORGANS OR SYSTEMS: ICD-10-CM

## 2024-06-15 DIAGNOSIS — E11.621 TYPE 2 DIABETES MELLITUS WITH FOOT ULCER: ICD-10-CM

## 2024-06-15 DIAGNOSIS — Z85.828 PERSONAL HISTORY OF OTHER MALIGNANT NEOPLASM OF SKIN: ICD-10-CM

## 2024-06-15 DIAGNOSIS — Z89.422 ACQUIRED ABSENCE OF OTHER LEFT TOE(S): ICD-10-CM

## 2024-06-15 DIAGNOSIS — Z86.16 PERSONAL HISTORY OF COVID-19: ICD-10-CM

## 2024-06-15 DIAGNOSIS — Z86.718 PERSONAL HISTORY OF OTHER VENOUS THROMBOSIS AND EMBOLISM: ICD-10-CM
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
## 2024-06-15 DIAGNOSIS — Z89.421 ACQUIRED ABSENCE OF OTHER RIGHT TOE(S): ICD-10-CM

## 2024-06-15 DIAGNOSIS — T81.89XD OTHER COMPLICATIONS OF PROCEDURES, NOT ELSEWHERE CLASSIFIED, SUBSEQUENT ENCOUNTER: ICD-10-CM

## 2024-06-15 DIAGNOSIS — Z94.0 KIDNEY TRANSPLANT STATUS: ICD-10-CM

## 2024-06-15 DIAGNOSIS — Z83.3 FAMILY HISTORY OF DIABETES MELLITUS: ICD-10-CM

## 2024-06-15 DIAGNOSIS — Z79.899 OTHER LONG TERM (CURRENT) DRUG THERAPY: ICD-10-CM

## 2024-06-15 DIAGNOSIS — E11.610 TYPE 2 DIABETES MELLITUS WITH DIABETIC NEUROPATHIC ARTHROPATHY: ICD-10-CM

## 2024-06-15 DIAGNOSIS — Z79.01 LONG TERM (CURRENT) USE OF ANTICOAGULANTS: ICD-10-CM

## 2024-06-15 DIAGNOSIS — Z79.4 LONG TERM (CURRENT) USE OF INSULIN: ICD-10-CM

## 2024-06-15 DIAGNOSIS — L97.412 NON-PRESSURE CHRONIC ULCER OF RIGHT HEEL AND MIDFOOT WITH FAT LAYER EXPOSED: ICD-10-CM

## 2024-06-15 DIAGNOSIS — Z86.73 PERSONAL HISTORY OF TRANSIENT ISCHEMIC ATTACK (TIA), AND CEREBRAL INFARCTION WITHOUT RESIDUAL DEFICITS: ICD-10-CM

## 2024-06-15 DIAGNOSIS — Z80.3 FAMILY HISTORY OF MALIGNANT NEOPLASM OF BREAST: ICD-10-CM

## 2024-06-15 DIAGNOSIS — Z87.81 PERSONAL HISTORY OF (HEALED) TRAUMATIC FRACTURE: ICD-10-CM
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
## 2024-06-15 DIAGNOSIS — E78.5 HYPERLIPIDEMIA, UNSPECIFIED: ICD-10-CM

## 2024-06-17 ENCOUNTER — OUTPATIENT (OUTPATIENT)
Dept: OUTPATIENT SERVICES | Facility: HOSPITAL | Age: 59
LOS: 1 days | Discharge: HOME | End: 2024-06-17
Payer: MEDICARE

## 2024-06-17 ENCOUNTER — APPOINTMENT (OUTPATIENT)
Dept: WOUND CARE | Facility: HOSPITAL | Age: 59
End: 2024-06-17
Payer: MEDICARE

## 2024-06-17 VITALS
RESPIRATION RATE: 16 BRPM | SYSTOLIC BLOOD PRESSURE: 124 MMHG | OXYGEN SATURATION: 96 % | BODY MASS INDEX: 41.75 KG/M2 | TEMPERATURE: 98.1 F | HEIGHT: 73 IN | HEART RATE: 72 BPM | DIASTOLIC BLOOD PRESSURE: 79 MMHG | WEIGHT: 315 LBS

## 2024-06-17 DIAGNOSIS — Z94.0 KIDNEY TRANSPLANT STATUS: Chronic | ICD-10-CM

## 2024-06-17 DIAGNOSIS — E11.621 TYPE 2 DIABETES MELLITUS WITH FOOT ULCER: ICD-10-CM

## 2024-06-17 DIAGNOSIS — Z89.421 ACQUIRED ABSENCE OF OTHER RIGHT TOE(S): Chronic | ICD-10-CM

## 2024-06-17 DIAGNOSIS — N18.6 END STAGE RENAL DISEASE: Chronic | ICD-10-CM

## 2024-06-17 DIAGNOSIS — Z09 ENCOUNTER FOR FOLLOW-UP EXAMINATION AFTER COMPLETED TREATMENT FOR CONDITIONS OTHER THAN MALIGNANT NEOPLASM: ICD-10-CM

## 2024-06-17 DIAGNOSIS — L97.412 NON-PRESSURE CHRONIC ULCER OF RIGHT HEEL AND MIDFOOT WITH FAT LAYER EXPOSED: ICD-10-CM

## 2024-06-17 DIAGNOSIS — Z98.890 OTHER SPECIFIED POSTPROCEDURAL STATES: Chronic | ICD-10-CM

## 2024-06-17 PROCEDURE — G0463: CPT

## 2024-06-17 PROCEDURE — ZZZZZ: CPT

## 2024-06-19 ENCOUNTER — OUTPATIENT (OUTPATIENT)
Dept: OUTPATIENT SERVICES | Facility: HOSPITAL | Age: 59
LOS: 1 days | Discharge: HOME | End: 2024-06-19
Payer: MEDICARE

## 2024-06-19 ENCOUNTER — APPOINTMENT (OUTPATIENT)
Dept: WOUND CARE | Facility: HOSPITAL | Age: 59
End: 2024-06-19
Payer: MEDICARE

## 2024-06-19 VITALS
BODY MASS INDEX: 41.75 KG/M2 | HEART RATE: 85 BPM | HEIGHT: 73 IN | OXYGEN SATURATION: 92 % | RESPIRATION RATE: 18 BRPM | SYSTOLIC BLOOD PRESSURE: 153 MMHG | WEIGHT: 315 LBS | DIASTOLIC BLOOD PRESSURE: 80 MMHG | TEMPERATURE: 98.8 F

## 2024-06-19 DIAGNOSIS — Z09 ENCOUNTER FOR FOLLOW-UP EXAMINATION AFTER COMPLETED TREATMENT FOR CONDITIONS OTHER THAN MALIGNANT NEOPLASM: ICD-10-CM

## 2024-06-19 DIAGNOSIS — N18.6 END STAGE RENAL DISEASE: Chronic | ICD-10-CM

## 2024-06-19 DIAGNOSIS — Z89.421 ACQUIRED ABSENCE OF OTHER RIGHT TOE(S): Chronic | ICD-10-CM

## 2024-06-19 PROCEDURE — 99213 OFFICE O/P EST LOW 20 MIN: CPT

## 2024-06-19 PROCEDURE — G0463: CPT

## 2024-06-19 NOTE — PHYSICAL EXAM
[4 x 4] : 4 x 4  [Abdominal Pad] : Abdominal Pad [0] : left 0 [1+] : left 1+ [Skin Ulcer] : ulcer [Alert] : alert [Oriented to Person] : oriented to person [Oriented to Place] : oriented to place [Oriented to Time] : oriented to time [Calm] : calm [Varicose Veins Of Lower Extremities] : not present [] : not present [Purpura] : no purpura  [Petechiae] : no petechiae [Skin Induration] : no induration [de-identified] : A&Ox3, NAD [de-identified] : HTN, HLD [de-identified] : Diabetic Charcot right foot deformity status post right Achilles tendon lengthening, right posterior tibial tendon lengthening, right peroneus brevis tendon detachment and reattachment with exostectomy of the right fifth metatarsal base and plantar cuboid, and right fifth digit proximal phalanx base resection [de-identified] : Right plantar midfoot ulcer healed. right lateral foot surgical site with central dehiscence and open wound down to skin, subcutaneous tissue, fat, no periwound erythema  [de-identified] : Diabetic neuropathy  [FreeTextEntry1] : Plantar Midfoot [FreeTextEntry2] : 3.5 [FreeTextEntry3] : 1.4 [FreeTextEntry4] : 1.0 [de-identified] : serosanguinous [de-identified] : Calloused [de-identified] : Patient expressed comfort post ACE application. Circulation/neuromuscular functions WNL post application. [de-identified] : Nai [de-identified] : Mechanically cleansed with sterile gauze and 0.9% normal saline. Kerlix ACE Stockingette  [FreeTextEntry7] : Right Plantar Foot - HEALED [de-identified] : No Product [TWNoteComboBox1] : Right [TWNoteComboBox4] : Small [TWNoteComboBox5] : No [TWNoteComboBox6] : Surgical [de-identified] : No [de-identified] : other [de-identified] : None [de-identified] : None [de-identified] : 100% [de-identified] : No [de-identified] : Ace wraps [de-identified] : 3x Weekly [de-identified] : Primary Dressing [de-identified] : False [de-identified] : False

## 2024-06-19 NOTE — HISTORY OF PRESENT ILLNESS
[FreeTextEntry1] : Patient is 58 year old male presents for follow-up status post right foot midfoot plantar exostectomy. Patient relates the right foot dressing was noted with bloody drainage and has been started on Eliquis per cardiologist for new onset Afib. Patient relates that he has been staying off of his right foot and has been utilizing a knee scooter.  6/19/2024 patient seen for follow-up status post right midfoot plantar exostectomy.  Patient relates that the dressings have been changed as advised since the last encounter.  Denies any other complaints at this time.

## 2024-06-19 NOTE — VITALS
[Pain related to present condition?] : The patient's  pain is not related to present condition. [] : No [de-identified] : 0/10

## 2024-06-19 NOTE — REVIEW OF SYSTEMS
[Skin Lesions] : skin lesion [Fever] : no fever [Chills] : no chills [Eye Pain] : no eye pain [Red Eyes] : eyes not red [Earache] : no earache [Nosebleeds] : no nosebleeds [Chest Pain] : no chest pain [Shortness Of Breath] : no shortness of breath [Cough] : no cough [Abdominal Pain] : no abdominal pain [Vomiting] : no vomiting [Diarrhea] : no diarrhea [Joint Swelling] : no joint swelling [Limb Pain] : no limb pain [Skin Wound] : no skin wound [Anxiety] : no anxiety [Easy Bleeding] : no tendency for easy bleeding [FreeTextEntry5] : HTN,HLD [FreeTextEntry7] : 40.9 BMI , morbid obesity  [FreeTextEntry8] : Kidney Transplant  [FreeTextEntry9] : Associated Right Diabetic Charcot foot status post right Achilles tendon lengthening, right posterior tibial tendon lengthening, right peroneus brevis tendon detachment and reattachment with exostectomy of the right fifth metatarsal base and plantar cuboid, and right fifth digit proximal phalanx base resection [de-identified] : Right plantar midfoot ulcer down to fat ,right lateral surgical site with sutures intact , no dehiscence     erbdvv gkl              ] [de-identified] : IDDM with neuropathy  [de-identified] : MARILUZ

## 2024-06-19 NOTE — ASSESSMENT
Marylu Dalal   MRN: [id number]    Department:  [facility] Emergency Department   Date of Visit:  4/8/2019           Disclosure     Insurance plans vary and the physician(s) referred by the ER may not be covered by your plan.  Please contact you [Verbal] : Verbal CARE PHYSICIAN AT ONCE OR RETURN IMMEDIATELY TO THE EMERGENCY DEPARTMENT. If you have been prescribed any medication(s), please fill your prescription right away and begin taking the medication(s) as directed.   If you believe that any of the medications [Demo] : Demo [Patient] : Patient [Good - alert, interested, motivated] : Good - alert, interested, motivated [Verbalizes knowledge/Understanding] : Verbalizes knowledge/understanding [Dressing changes] : dressing changes [Skin Care] : skin care [Signs and symptoms of infection] : sign and symptoms of infection [How and When to Call] : how and when to call [Patient responsibility to plan of care] : patient responsibility to plan of care [Stable] : stable [Home] : Home [Other: ____] : [unfilled] [Not Applicable - Long Term Care/Home Health Agency] : Long Term Care/Home Health Agency: Not Applicable [Spouse] : Spouse [] : No [FreeTextEntry2] : 1. Maintain skin integrity. 2. Promote healing. 3. Prevent infection. [FreeTextEntry4] : DPM assessed wound and shaved callous on right lateral wound. Advised to change dressing on lateral wound to Nai. Patient comes to [facility] for dressing changes and every week for assessment. F/U Friday and Monday for dressing changes and next Wednesday for assessment.

## 2024-06-20 DIAGNOSIS — L97.412 NON-PRESSURE CHRONIC ULCER OF RIGHT HEEL AND MIDFOOT WITH FAT LAYER EXPOSED: ICD-10-CM

## 2024-06-20 DIAGNOSIS — Z80.8 FAMILY HISTORY OF MALIGNANT NEOPLASM OF OTHER ORGANS OR SYSTEMS: ICD-10-CM

## 2024-06-20 DIAGNOSIS — E11.621 TYPE 2 DIABETES MELLITUS WITH FOOT ULCER: ICD-10-CM

## 2024-06-20 DIAGNOSIS — Z85.828 PERSONAL HISTORY OF OTHER MALIGNANT NEOPLASM OF SKIN: ICD-10-CM

## 2024-06-20 DIAGNOSIS — Z94.0 KIDNEY TRANSPLANT STATUS: ICD-10-CM

## 2024-06-20 DIAGNOSIS — Z79.4 LONG TERM (CURRENT) USE OF INSULIN: ICD-10-CM

## 2024-06-20 DIAGNOSIS — Z87.81 PERSONAL HISTORY OF (HEALED) TRAUMATIC FRACTURE: ICD-10-CM

## 2024-06-20 DIAGNOSIS — Z89.422 ACQUIRED ABSENCE OF OTHER LEFT TOE(S): ICD-10-CM

## 2024-06-20 DIAGNOSIS — E11.610 TYPE 2 DIABETES MELLITUS WITH DIABETIC NEUROPATHIC ARTHROPATHY: ICD-10-CM

## 2024-06-20 DIAGNOSIS — Z89.421 ACQUIRED ABSENCE OF OTHER RIGHT TOE(S): ICD-10-CM

## 2024-06-20 DIAGNOSIS — Z80.3 FAMILY HISTORY OF MALIGNANT NEOPLASM OF BREAST: ICD-10-CM

## 2024-06-20 DIAGNOSIS — L84 CORNS AND CALLOSITIES: ICD-10-CM

## 2024-06-20 DIAGNOSIS — Z86.73 PERSONAL HISTORY OF TRANSIENT ISCHEMIC ATTACK (TIA), AND CEREBRAL INFARCTION WITHOUT RESIDUAL DEFICITS: ICD-10-CM

## 2024-06-20 DIAGNOSIS — Z79.899 OTHER LONG TERM (CURRENT) DRUG THERAPY: ICD-10-CM

## 2024-06-20 DIAGNOSIS — Z86.718 PERSONAL HISTORY OF OTHER VENOUS THROMBOSIS AND EMBOLISM: ICD-10-CM

## 2024-06-20 DIAGNOSIS — E78.5 HYPERLIPIDEMIA, UNSPECIFIED: ICD-10-CM

## 2024-06-20 DIAGNOSIS — G47.33 OBSTRUCTIVE SLEEP APNEA (ADULT) (PEDIATRIC): ICD-10-CM

## 2024-06-20 DIAGNOSIS — Z79.01 LONG TERM (CURRENT) USE OF ANTICOAGULANTS: ICD-10-CM

## 2024-06-20 DIAGNOSIS — Z86.16 PERSONAL HISTORY OF COVID-19: ICD-10-CM

## 2024-06-20 DIAGNOSIS — Z83.3 FAMILY HISTORY OF DIABETES MELLITUS: ICD-10-CM

## 2024-06-21 ENCOUNTER — OUTPATIENT (OUTPATIENT)
Dept: OUTPATIENT SERVICES | Facility: HOSPITAL | Age: 59
LOS: 1 days | Discharge: HOME | End: 2024-06-21
Payer: MEDICARE

## 2024-06-21 ENCOUNTER — APPOINTMENT (OUTPATIENT)
Dept: WOUND CARE | Facility: HOSPITAL | Age: 59
End: 2024-06-21
Payer: MEDICARE

## 2024-06-21 VITALS
OXYGEN SATURATION: 93 % | TEMPERATURE: 97.9 F | RESPIRATION RATE: 20 BRPM | BODY MASS INDEX: 41.75 KG/M2 | SYSTOLIC BLOOD PRESSURE: 108 MMHG | WEIGHT: 315 LBS | DIASTOLIC BLOOD PRESSURE: 72 MMHG | HEART RATE: 76 BPM | HEIGHT: 73 IN

## 2024-06-21 DIAGNOSIS — E11.621 TYPE 2 DIABETES MELLITUS WITH FOOT ULCER: ICD-10-CM

## 2024-06-21 DIAGNOSIS — Z94.0 KIDNEY TRANSPLANT STATUS: Chronic | ICD-10-CM

## 2024-06-21 DIAGNOSIS — L97.412 NON-PRESSURE CHRONIC ULCER OF RIGHT HEEL AND MIDFOOT WITH FAT LAYER EXPOSED: ICD-10-CM

## 2024-06-21 DIAGNOSIS — Z09 ENCOUNTER FOR FOLLOW-UP EXAMINATION AFTER COMPLETED TREATMENT FOR CONDITIONS OTHER THAN MALIGNANT NEOPLASM: ICD-10-CM

## 2024-06-21 PROCEDURE — G0463: CPT

## 2024-06-21 PROCEDURE — ZZZZZ: CPT

## 2024-06-24 ENCOUNTER — APPOINTMENT (OUTPATIENT)
Dept: WOUND CARE | Facility: HOSPITAL | Age: 59
End: 2024-06-24
Payer: MEDICARE

## 2024-06-24 ENCOUNTER — OUTPATIENT (OUTPATIENT)
Dept: OUTPATIENT SERVICES | Facility: HOSPITAL | Age: 59
LOS: 1 days | Discharge: HOME | End: 2024-06-24
Payer: MEDICARE

## 2024-06-24 VITALS
TEMPERATURE: 98.1 F | DIASTOLIC BLOOD PRESSURE: 71 MMHG | BODY MASS INDEX: 41.75 KG/M2 | HEART RATE: 88 BPM | WEIGHT: 315 LBS | OXYGEN SATURATION: 95 % | HEIGHT: 73 IN | SYSTOLIC BLOOD PRESSURE: 109 MMHG | RESPIRATION RATE: 18 BRPM

## 2024-06-24 DIAGNOSIS — Z09 ENCOUNTER FOR FOLLOW-UP EXAMINATION AFTER COMPLETED TREATMENT FOR CONDITIONS OTHER THAN MALIGNANT NEOPLASM: ICD-10-CM

## 2024-06-24 DIAGNOSIS — Z98.890 OTHER SPECIFIED POSTPROCEDURAL STATES: Chronic | ICD-10-CM

## 2024-06-24 DIAGNOSIS — Z89.421 ACQUIRED ABSENCE OF OTHER RIGHT TOE(S): Chronic | ICD-10-CM

## 2024-06-24 DIAGNOSIS — N18.6 END STAGE RENAL DISEASE: Chronic | ICD-10-CM

## 2024-06-24 DIAGNOSIS — Z94.0 KIDNEY TRANSPLANT STATUS: Chronic | ICD-10-CM

## 2024-06-24 PROCEDURE — ZZZZZ: CPT

## 2024-06-24 PROCEDURE — G0463: CPT

## 2024-06-25 DIAGNOSIS — E11.621 TYPE 2 DIABETES MELLITUS WITH FOOT ULCER: ICD-10-CM

## 2024-06-25 DIAGNOSIS — L97.412 NON-PRESSURE CHRONIC ULCER OF RIGHT HEEL AND MIDFOOT WITH FAT LAYER EXPOSED: ICD-10-CM

## 2024-06-26 ENCOUNTER — APPOINTMENT (OUTPATIENT)
Dept: WOUND CARE | Facility: HOSPITAL | Age: 59
End: 2024-06-26
Payer: MEDICARE

## 2024-06-26 ENCOUNTER — OUTPATIENT (OUTPATIENT)
Dept: OUTPATIENT SERVICES | Facility: HOSPITAL | Age: 59
LOS: 1 days | Discharge: HOME | End: 2024-06-26
Payer: MEDICARE

## 2024-06-26 VITALS
BODY MASS INDEX: 41.75 KG/M2 | WEIGHT: 315 LBS | HEIGHT: 73 IN | SYSTOLIC BLOOD PRESSURE: 138 MMHG | TEMPERATURE: 98.9 F | OXYGEN SATURATION: 92 % | HEART RATE: 92 BPM | RESPIRATION RATE: 18 BRPM | DIASTOLIC BLOOD PRESSURE: 77 MMHG

## 2024-06-26 DIAGNOSIS — E11.621 TYPE 2 DIABETES MELLITUS WITH FOOT ULCER: ICD-10-CM

## 2024-06-26 DIAGNOSIS — N18.6 END STAGE RENAL DISEASE: Chronic | ICD-10-CM

## 2024-06-26 DIAGNOSIS — L97.412 NON-PRESSURE CHRONIC ULCER OF RIGHT HEEL AND MIDFOOT WITH FAT LAYER EXPOSED: ICD-10-CM

## 2024-06-26 DIAGNOSIS — Z89.421 ACQUIRED ABSENCE OF OTHER RIGHT TOE(S): Chronic | ICD-10-CM

## 2024-06-26 DIAGNOSIS — L97.509 TYPE 2 DIABETES MELLITUS WITH FOOT ULCER: ICD-10-CM

## 2024-06-26 DIAGNOSIS — Z09 ENCOUNTER FOR FOLLOW-UP EXAMINATION AFTER COMPLETED TREATMENT FOR CONDITIONS OTHER THAN MALIGNANT NEOPLASM: ICD-10-CM

## 2024-06-26 DIAGNOSIS — L84 CORNS AND CALLOSITIES: ICD-10-CM

## 2024-06-26 DIAGNOSIS — Z94.0 KIDNEY TRANSPLANT STATUS: Chronic | ICD-10-CM

## 2024-06-26 PROCEDURE — 99213 OFFICE O/P EST LOW 20 MIN: CPT

## 2024-06-26 PROCEDURE — G0463: CPT

## 2024-06-28 ENCOUNTER — OUTPATIENT (OUTPATIENT)
Dept: OUTPATIENT SERVICES | Facility: HOSPITAL | Age: 59
LOS: 1 days | Discharge: HOME | End: 2024-06-28
Payer: MEDICARE

## 2024-06-28 ENCOUNTER — APPOINTMENT (OUTPATIENT)
Dept: WOUND CARE | Facility: HOSPITAL | Age: 59
End: 2024-06-28
Payer: MEDICARE

## 2024-06-28 VITALS
DIASTOLIC BLOOD PRESSURE: 70 MMHG | WEIGHT: 315 LBS | BODY MASS INDEX: 41.75 KG/M2 | SYSTOLIC BLOOD PRESSURE: 131 MMHG | HEART RATE: 86 BPM | RESPIRATION RATE: 18 BRPM | TEMPERATURE: 98.6 F | OXYGEN SATURATION: 94 % | HEIGHT: 73 IN

## 2024-06-28 DIAGNOSIS — N18.6 END STAGE RENAL DISEASE: Chronic | ICD-10-CM

## 2024-06-28 DIAGNOSIS — Z98.890 OTHER SPECIFIED POSTPROCEDURAL STATES: Chronic | ICD-10-CM

## 2024-06-28 DIAGNOSIS — Z09 ENCOUNTER FOR FOLLOW-UP EXAMINATION AFTER COMPLETED TREATMENT FOR CONDITIONS OTHER THAN MALIGNANT NEOPLASM: ICD-10-CM

## 2024-06-28 DIAGNOSIS — Z94.0 KIDNEY TRANSPLANT STATUS: Chronic | ICD-10-CM

## 2024-06-28 DIAGNOSIS — Z89.421 ACQUIRED ABSENCE OF OTHER RIGHT TOE(S): Chronic | ICD-10-CM

## 2024-06-28 PROCEDURE — G0463: CPT

## 2024-06-28 PROCEDURE — ZZZZZ: CPT

## 2024-06-29 DIAGNOSIS — L97.412 NON-PRESSURE CHRONIC ULCER OF RIGHT HEEL AND MIDFOOT WITH FAT LAYER EXPOSED: ICD-10-CM

## 2024-06-29 DIAGNOSIS — E11.621 TYPE 2 DIABETES MELLITUS WITH FOOT ULCER: ICD-10-CM

## 2024-07-01 ENCOUNTER — OUTPATIENT (OUTPATIENT)
Dept: OUTPATIENT SERVICES | Facility: HOSPITAL | Age: 59
LOS: 1 days | Discharge: HOME | End: 2024-07-01
Payer: MEDICARE

## 2024-07-01 ENCOUNTER — APPOINTMENT (OUTPATIENT)
Dept: WOUND CARE | Facility: HOSPITAL | Age: 59
End: 2024-07-01
Payer: MEDICARE

## 2024-07-01 VITALS
HEART RATE: 79 BPM | RESPIRATION RATE: 18 BRPM | TEMPERATURE: 98.7 F | HEIGHT: 73 IN | BODY MASS INDEX: 41.75 KG/M2 | DIASTOLIC BLOOD PRESSURE: 73 MMHG | OXYGEN SATURATION: 95 % | SYSTOLIC BLOOD PRESSURE: 123 MMHG | WEIGHT: 315 LBS

## 2024-07-01 DIAGNOSIS — Z94.0 KIDNEY TRANSPLANT STATUS: ICD-10-CM

## 2024-07-01 DIAGNOSIS — L84 CORNS AND CALLOSITIES: ICD-10-CM

## 2024-07-01 DIAGNOSIS — L97.412 NON-PRESSURE CHRONIC ULCER OF RIGHT HEEL AND MIDFOOT WITH FAT LAYER EXPOSED: ICD-10-CM

## 2024-07-01 DIAGNOSIS — Z89.422 ACQUIRED ABSENCE OF OTHER LEFT TOE(S): ICD-10-CM

## 2024-07-01 DIAGNOSIS — Z89.421 ACQUIRED ABSENCE OF OTHER RIGHT TOE(S): ICD-10-CM

## 2024-07-01 DIAGNOSIS — E11.621 TYPE 2 DIABETES MELLITUS WITH FOOT ULCER: ICD-10-CM

## 2024-07-01 DIAGNOSIS — Z79.899 OTHER LONG TERM (CURRENT) DRUG THERAPY: ICD-10-CM

## 2024-07-01 DIAGNOSIS — Z79.01 LONG TERM (CURRENT) USE OF ANTICOAGULANTS: ICD-10-CM

## 2024-07-01 DIAGNOSIS — N18.6 END STAGE RENAL DISEASE: Chronic | ICD-10-CM

## 2024-07-01 DIAGNOSIS — Z86.718 PERSONAL HISTORY OF OTHER VENOUS THROMBOSIS AND EMBOLISM: ICD-10-CM

## 2024-07-01 DIAGNOSIS — E78.5 HYPERLIPIDEMIA, UNSPECIFIED: ICD-10-CM

## 2024-07-01 DIAGNOSIS — Z89.421 ACQUIRED ABSENCE OF OTHER RIGHT TOE(S): Chronic | ICD-10-CM

## 2024-07-01 DIAGNOSIS — Z80.3 FAMILY HISTORY OF MALIGNANT NEOPLASM OF BREAST: ICD-10-CM

## 2024-07-01 DIAGNOSIS — Z80.8 FAMILY HISTORY OF MALIGNANT NEOPLASM OF OTHER ORGANS OR SYSTEMS: ICD-10-CM

## 2024-07-01 DIAGNOSIS — Z83.3 FAMILY HISTORY OF DIABETES MELLITUS: ICD-10-CM

## 2024-07-01 DIAGNOSIS — Z79.4 LONG TERM (CURRENT) USE OF INSULIN: ICD-10-CM

## 2024-07-01 DIAGNOSIS — Z87.81 PERSONAL HISTORY OF (HEALED) TRAUMATIC FRACTURE: ICD-10-CM

## 2024-07-01 DIAGNOSIS — Z86.73 PERSONAL HISTORY OF TRANSIENT ISCHEMIC ATTACK (TIA), AND CEREBRAL INFARCTION WITHOUT RESIDUAL DEFICITS: ICD-10-CM

## 2024-07-01 DIAGNOSIS — G47.33 OBSTRUCTIVE SLEEP APNEA (ADULT) (PEDIATRIC): ICD-10-CM

## 2024-07-01 DIAGNOSIS — Z09 ENCOUNTER FOR FOLLOW-UP EXAMINATION AFTER COMPLETED TREATMENT FOR CONDITIONS OTHER THAN MALIGNANT NEOPLASM: ICD-10-CM

## 2024-07-01 DIAGNOSIS — Z94.0 KIDNEY TRANSPLANT STATUS: Chronic | ICD-10-CM

## 2024-07-01 DIAGNOSIS — Z85.828 PERSONAL HISTORY OF OTHER MALIGNANT NEOPLASM OF SKIN: ICD-10-CM

## 2024-07-01 DIAGNOSIS — Z86.16 PERSONAL HISTORY OF COVID-19: ICD-10-CM

## 2024-07-01 DIAGNOSIS — E11.610 TYPE 2 DIABETES MELLITUS WITH DIABETIC NEUROPATHIC ARTHROPATHY: ICD-10-CM

## 2024-07-01 PROCEDURE — ZZZZZ: CPT

## 2024-07-01 PROCEDURE — G0463: CPT

## 2024-07-03 ENCOUNTER — APPOINTMENT (OUTPATIENT)
Dept: WOUND CARE | Facility: HOSPITAL | Age: 59
End: 2024-07-03
Payer: MEDICARE

## 2024-07-03 ENCOUNTER — OUTPATIENT (OUTPATIENT)
Dept: OUTPATIENT SERVICES | Facility: HOSPITAL | Age: 59
LOS: 1 days | Discharge: HOME | End: 2024-07-03
Payer: MEDICARE

## 2024-07-03 VITALS
WEIGHT: 315 LBS | HEIGHT: 73 IN | SYSTOLIC BLOOD PRESSURE: 136 MMHG | HEART RATE: 81 BPM | RESPIRATION RATE: 18 BRPM | BODY MASS INDEX: 41.75 KG/M2 | TEMPERATURE: 98.4 F | OXYGEN SATURATION: 96 % | DIASTOLIC BLOOD PRESSURE: 81 MMHG

## 2024-07-03 DIAGNOSIS — L97.412 NON-PRESSURE CHRONIC ULCER OF RIGHT HEEL AND MIDFOOT WITH FAT LAYER EXPOSED: ICD-10-CM

## 2024-07-03 DIAGNOSIS — Z98.890 OTHER SPECIFIED POSTPROCEDURAL STATES: Chronic | ICD-10-CM

## 2024-07-03 DIAGNOSIS — Z94.0 KIDNEY TRANSPLANT STATUS: Chronic | ICD-10-CM

## 2024-07-03 DIAGNOSIS — Z09 ENCOUNTER FOR FOLLOW-UP EXAMINATION AFTER COMPLETED TREATMENT FOR CONDITIONS OTHER THAN MALIGNANT NEOPLASM: ICD-10-CM

## 2024-07-03 PROCEDURE — G0463: CPT

## 2024-07-03 PROCEDURE — 99213 OFFICE O/P EST LOW 20 MIN: CPT

## 2024-07-05 ENCOUNTER — APPOINTMENT (OUTPATIENT)
Dept: WOUND CARE | Facility: HOSPITAL | Age: 59
End: 2024-07-05
Payer: MEDICARE

## 2024-07-05 ENCOUNTER — OUTPATIENT (OUTPATIENT)
Dept: OUTPATIENT SERVICES | Facility: HOSPITAL | Age: 59
LOS: 1 days | Discharge: HOME | End: 2024-07-05
Payer: MEDICARE

## 2024-07-05 VITALS
HEART RATE: 86 BPM | RESPIRATION RATE: 18 BRPM | BODY MASS INDEX: 41.75 KG/M2 | HEIGHT: 73 IN | OXYGEN SATURATION: 95 % | DIASTOLIC BLOOD PRESSURE: 77 MMHG | WEIGHT: 315 LBS | SYSTOLIC BLOOD PRESSURE: 145 MMHG | TEMPERATURE: 98.1 F

## 2024-07-05 DIAGNOSIS — Z94.0 KIDNEY TRANSPLANT STATUS: Chronic | ICD-10-CM

## 2024-07-05 DIAGNOSIS — Z89.421 ACQUIRED ABSENCE OF OTHER RIGHT TOE(S): Chronic | ICD-10-CM

## 2024-07-05 DIAGNOSIS — Z09 ENCOUNTER FOR FOLLOW-UP EXAMINATION AFTER COMPLETED TREATMENT FOR CONDITIONS OTHER THAN MALIGNANT NEOPLASM: ICD-10-CM

## 2024-07-05 DIAGNOSIS — N18.6 END STAGE RENAL DISEASE: Chronic | ICD-10-CM

## 2024-07-05 DIAGNOSIS — Z98.890 OTHER SPECIFIED POSTPROCEDURAL STATES: Chronic | ICD-10-CM

## 2024-07-05 PROCEDURE — G0463: CPT

## 2024-07-05 PROCEDURE — ZZZZZ: CPT

## 2024-07-07 DIAGNOSIS — Z89.421 ACQUIRED ABSENCE OF OTHER RIGHT TOE(S): ICD-10-CM

## 2024-07-07 DIAGNOSIS — E78.5 HYPERLIPIDEMIA, UNSPECIFIED: ICD-10-CM

## 2024-07-07 DIAGNOSIS — E11.621 TYPE 2 DIABETES MELLITUS WITH FOOT ULCER: ICD-10-CM

## 2024-07-07 DIAGNOSIS — Z86.73 PERSONAL HISTORY OF TRANSIENT ISCHEMIC ATTACK (TIA), AND CEREBRAL INFARCTION WITHOUT RESIDUAL DEFICITS: ICD-10-CM

## 2024-07-07 DIAGNOSIS — Z79.899 OTHER LONG TERM (CURRENT) DRUG THERAPY: ICD-10-CM

## 2024-07-07 DIAGNOSIS — Z85.828 PERSONAL HISTORY OF OTHER MALIGNANT NEOPLASM OF SKIN: ICD-10-CM

## 2024-07-07 DIAGNOSIS — E11.610 TYPE 2 DIABETES MELLITUS WITH DIABETIC NEUROPATHIC ARTHROPATHY: ICD-10-CM

## 2024-07-07 DIAGNOSIS — Z83.3 FAMILY HISTORY OF DIABETES MELLITUS: ICD-10-CM

## 2024-07-07 DIAGNOSIS — Z80.8 FAMILY HISTORY OF MALIGNANT NEOPLASM OF OTHER ORGANS OR SYSTEMS: ICD-10-CM

## 2024-07-07 DIAGNOSIS — Z94.0 KIDNEY TRANSPLANT STATUS: ICD-10-CM

## 2024-07-07 DIAGNOSIS — Z89.422 ACQUIRED ABSENCE OF OTHER LEFT TOE(S): ICD-10-CM

## 2024-07-07 DIAGNOSIS — Z79.01 LONG TERM (CURRENT) USE OF ANTICOAGULANTS: ICD-10-CM

## 2024-07-07 DIAGNOSIS — Z87.81 PERSONAL HISTORY OF (HEALED) TRAUMATIC FRACTURE: ICD-10-CM

## 2024-07-07 DIAGNOSIS — L97.412 NON-PRESSURE CHRONIC ULCER OF RIGHT HEEL AND MIDFOOT WITH FAT LAYER EXPOSED: ICD-10-CM

## 2024-07-07 DIAGNOSIS — Z86.16 PERSONAL HISTORY OF COVID-19: ICD-10-CM

## 2024-07-07 DIAGNOSIS — Z79.4 LONG TERM (CURRENT) USE OF INSULIN: ICD-10-CM

## 2024-07-07 DIAGNOSIS — Z80.3 FAMILY HISTORY OF MALIGNANT NEOPLASM OF BREAST: ICD-10-CM

## 2024-07-07 DIAGNOSIS — G47.33 OBSTRUCTIVE SLEEP APNEA (ADULT) (PEDIATRIC): ICD-10-CM

## 2024-07-07 DIAGNOSIS — Z86.718 PERSONAL HISTORY OF OTHER VENOUS THROMBOSIS AND EMBOLISM: ICD-10-CM

## 2024-07-07 DIAGNOSIS — L84 CORNS AND CALLOSITIES: ICD-10-CM

## 2024-07-08 ENCOUNTER — OUTPATIENT (OUTPATIENT)
Dept: OUTPATIENT SERVICES | Facility: HOSPITAL | Age: 59
LOS: 1 days | Discharge: HOME | End: 2024-07-08
Payer: MEDICARE

## 2024-07-08 ENCOUNTER — APPOINTMENT (OUTPATIENT)
Dept: WOUND CARE | Facility: HOSPITAL | Age: 59
End: 2024-07-08
Payer: MEDICARE

## 2024-07-08 ENCOUNTER — APPOINTMENT (OUTPATIENT)
Dept: PODIATRY | Facility: CLINIC | Age: 59
End: 2024-07-08

## 2024-07-08 ENCOUNTER — NON-APPOINTMENT (OUTPATIENT)
Age: 59
End: 2024-07-08

## 2024-07-08 VITALS
HEIGHT: 73 IN | SYSTOLIC BLOOD PRESSURE: 122 MMHG | BODY MASS INDEX: 41.75 KG/M2 | OXYGEN SATURATION: 94 % | TEMPERATURE: 98.8 F | WEIGHT: 315 LBS | DIASTOLIC BLOOD PRESSURE: 77 MMHG | HEART RATE: 80 BPM

## 2024-07-08 VITALS
WEIGHT: 315 LBS | BODY MASS INDEX: 41.75 KG/M2 | RESPIRATION RATE: 18 BRPM | HEART RATE: 78 BPM | TEMPERATURE: 99.3 F | SYSTOLIC BLOOD PRESSURE: 134 MMHG | HEIGHT: 73 IN | DIASTOLIC BLOOD PRESSURE: 80 MMHG | OXYGEN SATURATION: 95 %

## 2024-07-08 DIAGNOSIS — Z89.421 ACQUIRED ABSENCE OF OTHER RIGHT TOE(S): Chronic | ICD-10-CM

## 2024-07-08 DIAGNOSIS — Z94.0 KIDNEY TRANSPLANT STATUS: Chronic | ICD-10-CM

## 2024-07-08 DIAGNOSIS — Z98.890 OTHER SPECIFIED POSTPROCEDURAL STATES: Chronic | ICD-10-CM

## 2024-07-08 DIAGNOSIS — E11.621 TYPE 2 DIABETES MELLITUS WITH FOOT ULCER: ICD-10-CM

## 2024-07-08 PROCEDURE — 11720 DEBRIDE NAIL 1-5: CPT | Mod: 79

## 2024-07-08 PROCEDURE — G0463: CPT

## 2024-07-08 PROCEDURE — ZZZZZ: CPT

## 2024-07-09 DIAGNOSIS — L97.412 NON-PRESSURE CHRONIC ULCER OF RIGHT HEEL AND MIDFOOT WITH FAT LAYER EXPOSED: ICD-10-CM

## 2024-07-09 DIAGNOSIS — E11.621 TYPE 2 DIABETES MELLITUS WITH FOOT ULCER: ICD-10-CM

## 2024-07-10 ENCOUNTER — APPOINTMENT (OUTPATIENT)
Dept: WOUND CARE | Facility: HOSPITAL | Age: 59
End: 2024-07-10
Payer: MEDICARE

## 2024-07-10 ENCOUNTER — OUTPATIENT (OUTPATIENT)
Dept: OUTPATIENT SERVICES | Facility: HOSPITAL | Age: 59
LOS: 1 days | Discharge: HOME | End: 2024-07-10
Payer: MEDICARE

## 2024-07-10 VITALS
TEMPERATURE: 98.3 F | RESPIRATION RATE: 15 BRPM | HEIGHT: 73 IN | BODY MASS INDEX: 41.75 KG/M2 | HEART RATE: 79 BPM | WEIGHT: 315 LBS | SYSTOLIC BLOOD PRESSURE: 122 MMHG | OXYGEN SATURATION: 95 % | DIASTOLIC BLOOD PRESSURE: 77 MMHG

## 2024-07-10 DIAGNOSIS — Z98.890 OTHER SPECIFIED POSTPROCEDURAL STATES: Chronic | ICD-10-CM

## 2024-07-10 DIAGNOSIS — E11.621 TYPE 2 DIABETES MELLITUS WITH FOOT ULCER: ICD-10-CM

## 2024-07-10 DIAGNOSIS — N18.6 END STAGE RENAL DISEASE: Chronic | ICD-10-CM

## 2024-07-10 DIAGNOSIS — Z89.421 ACQUIRED ABSENCE OF OTHER RIGHT TOE(S): Chronic | ICD-10-CM

## 2024-07-10 DIAGNOSIS — Z94.0 KIDNEY TRANSPLANT STATUS: Chronic | ICD-10-CM

## 2024-07-10 DIAGNOSIS — L84 CORNS AND CALLOSITIES: ICD-10-CM

## 2024-07-10 PROCEDURE — 99213 OFFICE O/P EST LOW 20 MIN: CPT

## 2024-07-10 PROCEDURE — G0463: CPT

## 2024-07-12 ENCOUNTER — APPOINTMENT (OUTPATIENT)
Dept: WOUND CARE | Facility: HOSPITAL | Age: 59
End: 2024-07-12
Payer: MEDICARE

## 2024-07-12 ENCOUNTER — OUTPATIENT (OUTPATIENT)
Dept: OUTPATIENT SERVICES | Facility: HOSPITAL | Age: 59
LOS: 1 days | End: 2024-07-12

## 2024-07-12 VITALS
DIASTOLIC BLOOD PRESSURE: 77 MMHG | HEIGHT: 73 IN | RESPIRATION RATE: 18 BRPM | TEMPERATURE: 98.2 F | WEIGHT: 315 LBS | HEART RATE: 80 BPM | SYSTOLIC BLOOD PRESSURE: 125 MMHG | BODY MASS INDEX: 41.75 KG/M2 | OXYGEN SATURATION: 93 %

## 2024-07-12 DIAGNOSIS — Z94.0 KIDNEY TRANSPLANT STATUS: Chronic | ICD-10-CM

## 2024-07-12 DIAGNOSIS — N18.6 END STAGE RENAL DISEASE: Chronic | ICD-10-CM

## 2024-07-12 DIAGNOSIS — L97.412 NON-PRESSURE CHRONIC ULCER OF RIGHT HEEL AND MIDFOOT WITH FAT LAYER EXPOSED: ICD-10-CM

## 2024-07-12 DIAGNOSIS — Z98.890 OTHER SPECIFIED POSTPROCEDURAL STATES: Chronic | ICD-10-CM

## 2024-07-12 DIAGNOSIS — E11.621 TYPE 2 DIABETES MELLITUS WITH FOOT ULCER: ICD-10-CM

## 2024-07-12 DIAGNOSIS — Z89.421 ACQUIRED ABSENCE OF OTHER RIGHT TOE(S): Chronic | ICD-10-CM

## 2024-07-12 PROCEDURE — 29581 APPL MULTLAYER CMPRN SYS LEG: CPT | Mod: RT

## 2024-07-12 PROCEDURE — ZZZZZ: CPT

## 2024-07-15 ENCOUNTER — APPOINTMENT (OUTPATIENT)
Dept: CARDIOLOGY | Facility: CLINIC | Age: 59
End: 2024-07-15
Payer: MEDICARE

## 2024-07-15 ENCOUNTER — APPOINTMENT (OUTPATIENT)
Dept: WOUND CARE | Facility: HOSPITAL | Age: 59
End: 2024-07-15
Payer: MEDICARE

## 2024-07-15 ENCOUNTER — OUTPATIENT (OUTPATIENT)
Dept: OUTPATIENT SERVICES | Facility: HOSPITAL | Age: 59
LOS: 1 days | Discharge: HOME | End: 2024-07-15
Payer: MEDICARE

## 2024-07-15 ENCOUNTER — NON-APPOINTMENT (OUTPATIENT)
Age: 59
End: 2024-07-15

## 2024-07-15 VITALS
RESPIRATION RATE: 20 BRPM | WEIGHT: 315 LBS | HEIGHT: 73 IN | BODY MASS INDEX: 41.75 KG/M2 | SYSTOLIC BLOOD PRESSURE: 142 MMHG | OXYGEN SATURATION: 93 % | TEMPERATURE: 98 F | DIASTOLIC BLOOD PRESSURE: 79 MMHG | HEART RATE: 78 BPM

## 2024-07-15 VITALS
DIASTOLIC BLOOD PRESSURE: 69 MMHG | HEART RATE: 86 BPM | OXYGEN SATURATION: 96 % | WEIGHT: 315 LBS | SYSTOLIC BLOOD PRESSURE: 110 MMHG | BODY MASS INDEX: 41.75 KG/M2 | HEIGHT: 73 IN

## 2024-07-15 DIAGNOSIS — Z86.73 PERSONAL HISTORY OF TRANSIENT ISCHEMIC ATTACK (TIA), AND CEREBRAL INFARCTION WITHOUT RESIDUAL DEFICITS: ICD-10-CM

## 2024-07-15 DIAGNOSIS — I48.91 UNSPECIFIED ATRIAL FIBRILLATION: ICD-10-CM

## 2024-07-15 DIAGNOSIS — Z80.3 FAMILY HISTORY OF MALIGNANT NEOPLASM OF BREAST: ICD-10-CM

## 2024-07-15 DIAGNOSIS — L97.412 NON-PRESSURE CHRONIC ULCER OF RIGHT HEEL AND MIDFOOT WITH FAT LAYER EXPOSED: ICD-10-CM

## 2024-07-15 DIAGNOSIS — Z89.422 ACQUIRED ABSENCE OF OTHER LEFT TOE(S): ICD-10-CM

## 2024-07-15 DIAGNOSIS — E11.610 TYPE 2 DIABETES MELLITUS WITH DIABETIC NEUROPATHIC ARTHROPATHY: ICD-10-CM

## 2024-07-15 DIAGNOSIS — E11.621 TYPE 2 DIABETES MELLITUS WITH FOOT ULCER: ICD-10-CM

## 2024-07-15 DIAGNOSIS — Z89.421 ACQUIRED ABSENCE OF OTHER RIGHT TOE(S): Chronic | ICD-10-CM

## 2024-07-15 DIAGNOSIS — Z86.16 PERSONAL HISTORY OF COVID-19: ICD-10-CM

## 2024-07-15 DIAGNOSIS — Z87.81 PERSONAL HISTORY OF (HEALED) TRAUMATIC FRACTURE: ICD-10-CM

## 2024-07-15 DIAGNOSIS — Z86.718 PERSONAL HISTORY OF OTHER VENOUS THROMBOSIS AND EMBOLISM: ICD-10-CM

## 2024-07-15 DIAGNOSIS — Z79.4 LONG TERM (CURRENT) USE OF INSULIN: ICD-10-CM

## 2024-07-15 DIAGNOSIS — Z98.890 OTHER SPECIFIED POSTPROCEDURAL STATES: Chronic | ICD-10-CM

## 2024-07-15 DIAGNOSIS — Z80.8 FAMILY HISTORY OF MALIGNANT NEOPLASM OF OTHER ORGANS OR SYSTEMS: ICD-10-CM

## 2024-07-15 DIAGNOSIS — Z83.3 FAMILY HISTORY OF DIABETES MELLITUS: ICD-10-CM

## 2024-07-15 DIAGNOSIS — G47.33 OBSTRUCTIVE SLEEP APNEA (ADULT) (PEDIATRIC): ICD-10-CM

## 2024-07-15 DIAGNOSIS — Z79.01 LONG TERM (CURRENT) USE OF ANTICOAGULANTS: ICD-10-CM

## 2024-07-15 DIAGNOSIS — N18.6 END STAGE RENAL DISEASE: Chronic | ICD-10-CM

## 2024-07-15 DIAGNOSIS — Z79.899 OTHER LONG TERM (CURRENT) DRUG THERAPY: ICD-10-CM

## 2024-07-15 DIAGNOSIS — L84 CORNS AND CALLOSITIES: ICD-10-CM

## 2024-07-15 DIAGNOSIS — Z94.0 KIDNEY TRANSPLANT STATUS: ICD-10-CM

## 2024-07-15 DIAGNOSIS — Z85.828 PERSONAL HISTORY OF OTHER MALIGNANT NEOPLASM OF SKIN: ICD-10-CM

## 2024-07-15 DIAGNOSIS — Z89.421 ACQUIRED ABSENCE OF OTHER RIGHT TOE(S): ICD-10-CM

## 2024-07-15 DIAGNOSIS — Z94.0 KIDNEY TRANSPLANT STATUS: Chronic | ICD-10-CM

## 2024-07-15 DIAGNOSIS — E78.5 HYPERLIPIDEMIA, UNSPECIFIED: ICD-10-CM

## 2024-07-15 PROCEDURE — ZZZZZ: CPT

## 2024-07-15 PROCEDURE — 99215 OFFICE O/P EST HI 40 MIN: CPT

## 2024-07-15 PROCEDURE — G2211 COMPLEX E/M VISIT ADD ON: CPT

## 2024-07-15 PROCEDURE — 93000 ELECTROCARDIOGRAM COMPLETE: CPT

## 2024-07-15 PROCEDURE — G0463: CPT

## 2024-07-15 NOTE — PRE-OP CHECKLIST - SIDE RAILS UP
tenderness.   Neurological:      Mental Status: He is alert and oriented to person, place, and time. Mental status is at baseline.   Psychiatric:         Mood and Affect: Mood normal.         Behavior: Behavior normal.         DATA:  Results for orders placed or performed in visit on 07/15/24   POCT Urinalysis Dipstick w/ Micro (Auto)   Result Value Ref Range    Color, UA Yellow     Clarity, UA Clear Clear    Glucose, Ur Neg     Bilirubin Urine 0 mg/dL    Ketones, Urine Negative     Specific Gravity, UA 1.030 1.005 - 1.030    Blood, Urine Positive     pH, UA 5.5 4.5 - 8.0    Protein, UA Negative Negative    Nitrite, Urine Positive     Leukocytes, UA Trace     Urobilinogen, Urine 0.2 mg/dL     RBC Urine, POC 1     WBC Urine, POC 12     Bacteria Urine, POC 2+     yeast urine, poc      Casts Urine, POC      Epi Cells Urine, POC      crystals urine, poc     WI Measure, post-void residual, US, non-imaging   Result Value Ref Range    post void residual 82ml ml       ASSESSMENT/PLAN  1. Retention of urine  No further episodes of retention.  Incomplete emptying, but does not require intervention.  - WI Measure, post-void residual, US, non-imaging  - POCT Urinalysis Dipstick w/ Micro (Auto)    2. BPH with obstruction/lower urinary tract symptoms  Lower urinary tract symptoms stable.  Urinalysis with bacteria, but patient is uncircumcised.  This is stable for him.  He is asymptomatic regarding UTI.  We will continue to follow-up yearly with PSA until 81 years old.  Continue Flomax.  - PSA, Diagnostic; Future      Orders Placed This Encounter   Procedures    PSA, Diagnostic     Standing Status:   Future     Standing Expiration Date:   7/15/2025    POCT Urinalysis Dipstick w/ Micro (Auto)    WI Measure, post-void residual, US, non-imaging     PVR - 82ml        Return in about 1 year (around 7/15/2025) for PSA prior.    An electronic signature was used to authenticate this note.    AMINA OTERO, DEISY - CNP    All information 
done

## 2024-07-17 ENCOUNTER — APPOINTMENT (OUTPATIENT)
Dept: WOUND CARE | Facility: HOSPITAL | Age: 59
End: 2024-07-17
Payer: MEDICARE

## 2024-07-17 ENCOUNTER — OUTPATIENT (OUTPATIENT)
Dept: OUTPATIENT SERVICES | Facility: HOSPITAL | Age: 59
LOS: 1 days | Discharge: HOME | End: 2024-07-17
Payer: MEDICARE

## 2024-07-17 VITALS
TEMPERATURE: 98.4 F | RESPIRATION RATE: 20 BRPM | DIASTOLIC BLOOD PRESSURE: 80 MMHG | OXYGEN SATURATION: 97 % | WEIGHT: 315 LBS | BODY MASS INDEX: 41.75 KG/M2 | HEIGHT: 73 IN | HEART RATE: 84 BPM | SYSTOLIC BLOOD PRESSURE: 152 MMHG

## 2024-07-17 DIAGNOSIS — Z94.0 KIDNEY TRANSPLANT STATUS: Chronic | ICD-10-CM

## 2024-07-17 DIAGNOSIS — L97.412 NON-PRESSURE CHRONIC ULCER OF RIGHT HEEL AND MIDFOOT WITH FAT LAYER EXPOSED: ICD-10-CM

## 2024-07-17 DIAGNOSIS — T81.89XA OTHER COMPLICATIONS OF PROCEDURES, NOT ELSEWHERE CLASSIFIED, INITIAL ENCOUNTER: ICD-10-CM

## 2024-07-17 DIAGNOSIS — Z98.890 OTHER SPECIFIED POSTPROCEDURAL STATES: Chronic | ICD-10-CM

## 2024-07-17 DIAGNOSIS — E11.621 TYPE 2 DIABETES MELLITUS WITH FOOT ULCER: ICD-10-CM

## 2024-07-17 DIAGNOSIS — N18.6 END STAGE RENAL DISEASE: Chronic | ICD-10-CM

## 2024-07-17 PROCEDURE — 12020 TX SUPFC WND DEHSN SMPL CLSR: CPT

## 2024-07-19 ENCOUNTER — APPOINTMENT (OUTPATIENT)
Dept: WOUND CARE | Facility: HOSPITAL | Age: 59
End: 2024-07-19
Payer: MEDICARE

## 2024-07-19 ENCOUNTER — OUTPATIENT (OUTPATIENT)
Dept: OUTPATIENT SERVICES | Facility: HOSPITAL | Age: 59
LOS: 1 days | Discharge: HOME | End: 2024-07-19
Payer: MEDICARE

## 2024-07-19 VITALS
HEIGHT: 73 IN | HEART RATE: 70 BPM | OXYGEN SATURATION: 97 % | WEIGHT: 315 LBS | BODY MASS INDEX: 41.75 KG/M2 | DIASTOLIC BLOOD PRESSURE: 67 MMHG | TEMPERATURE: 98.4 F | RESPIRATION RATE: 20 BRPM | SYSTOLIC BLOOD PRESSURE: 118 MMHG

## 2024-07-19 DIAGNOSIS — E11.621 TYPE 2 DIABETES MELLITUS WITH FOOT ULCER: ICD-10-CM

## 2024-07-19 DIAGNOSIS — Z94.0 KIDNEY TRANSPLANT STATUS: Chronic | ICD-10-CM

## 2024-07-19 DIAGNOSIS — Z86.73 PERSONAL HISTORY OF TRANSIENT ISCHEMIC ATTACK (TIA), AND CEREBRAL INFARCTION WITHOUT RESIDUAL DEFICITS: ICD-10-CM

## 2024-07-19 DIAGNOSIS — L97.412 NON-PRESSURE CHRONIC ULCER OF RIGHT HEEL AND MIDFOOT WITH FAT LAYER EXPOSED: ICD-10-CM
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
## 2024-07-19 DIAGNOSIS — Z79.899 OTHER LONG TERM (CURRENT) DRUG THERAPY: ICD-10-CM

## 2024-07-19 DIAGNOSIS — N18.6 END STAGE RENAL DISEASE: Chronic | ICD-10-CM

## 2024-07-19 DIAGNOSIS — Z85.828 PERSONAL HISTORY OF OTHER MALIGNANT NEOPLASM OF SKIN: ICD-10-CM

## 2024-07-19 DIAGNOSIS — G47.33 OBSTRUCTIVE SLEEP APNEA (ADULT) (PEDIATRIC): ICD-10-CM

## 2024-07-19 DIAGNOSIS — Z89.422 ACQUIRED ABSENCE OF OTHER LEFT TOE(S): ICD-10-CM

## 2024-07-19 DIAGNOSIS — Z86.16 PERSONAL HISTORY OF COVID-19: ICD-10-CM

## 2024-07-19 DIAGNOSIS — E11.610 TYPE 2 DIABETES MELLITUS WITH DIABETIC NEUROPATHIC ARTHROPATHY: ICD-10-CM

## 2024-07-19 DIAGNOSIS — Z89.421 ACQUIRED ABSENCE OF OTHER RIGHT TOE(S): ICD-10-CM

## 2024-07-19 DIAGNOSIS — T81.89XA OTHER COMPLICATIONS OF PROCEDURES, NOT ELSEWHERE CLASSIFIED, INITIAL ENCOUNTER: ICD-10-CM

## 2024-07-19 DIAGNOSIS — Z98.890 OTHER SPECIFIED POSTPROCEDURAL STATES: Chronic | ICD-10-CM

## 2024-07-19 DIAGNOSIS — Z79.01 LONG TERM (CURRENT) USE OF ANTICOAGULANTS: ICD-10-CM
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
## 2024-07-19 DIAGNOSIS — Z79.4 LONG TERM (CURRENT) USE OF INSULIN: ICD-10-CM

## 2024-07-19 DIAGNOSIS — Z86.718 PERSONAL HISTORY OF OTHER VENOUS THROMBOSIS AND EMBOLISM: ICD-10-CM

## 2024-07-19 DIAGNOSIS — Z89.421 ACQUIRED ABSENCE OF OTHER RIGHT TOE(S): Chronic | ICD-10-CM

## 2024-07-19 DIAGNOSIS — Z94.0 KIDNEY TRANSPLANT STATUS: ICD-10-CM

## 2024-07-19 DIAGNOSIS — Z87.81 PERSONAL HISTORY OF (HEALED) TRAUMATIC FRACTURE: ICD-10-CM

## 2024-07-19 DIAGNOSIS — E78.5 HYPERLIPIDEMIA, UNSPECIFIED: ICD-10-CM

## 2024-07-19 PROCEDURE — ZZZZZ: CPT

## 2024-07-19 PROCEDURE — 29581 APPL MULTLAYER CMPRN SYS LEG: CPT | Mod: RT

## 2024-07-20 DIAGNOSIS — E11.621 TYPE 2 DIABETES MELLITUS WITH FOOT ULCER: ICD-10-CM
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
## 2024-07-20 DIAGNOSIS — T81.89XD OTHER COMPLICATIONS OF PROCEDURES, NOT ELSEWHERE CLASSIFIED, SUBSEQUENT ENCOUNTER: ICD-10-CM

## 2024-07-20 DIAGNOSIS — L97.412 NON-PRESSURE CHRONIC ULCER OF RIGHT HEEL AND MIDFOOT WITH FAT LAYER EXPOSED: ICD-10-CM
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
## 2024-07-22 ENCOUNTER — APPOINTMENT (OUTPATIENT)
Dept: WOUND CARE | Facility: HOSPITAL | Age: 59
End: 2024-07-22
Payer: MEDICARE

## 2024-07-22 VITALS
HEIGHT: 73 IN | DIASTOLIC BLOOD PRESSURE: 62 MMHG | BODY MASS INDEX: 41.75 KG/M2 | SYSTOLIC BLOOD PRESSURE: 96 MMHG | RESPIRATION RATE: 18 BRPM | WEIGHT: 315 LBS | HEART RATE: 64 BPM | OXYGEN SATURATION: 95 % | TEMPERATURE: 97.9 F

## 2024-07-22 PROCEDURE — ZZZZZ: CPT

## 2024-07-24 ENCOUNTER — APPOINTMENT (OUTPATIENT)
Dept: WOUND CARE | Facility: HOSPITAL | Age: 59
End: 2024-07-24
Payer: MEDICARE

## 2024-07-24 VITALS
TEMPERATURE: 98.1 F | HEART RATE: 81 BPM | OXYGEN SATURATION: 94 % | BODY MASS INDEX: 41.75 KG/M2 | WEIGHT: 315 LBS | DIASTOLIC BLOOD PRESSURE: 69 MMHG | SYSTOLIC BLOOD PRESSURE: 145 MMHG | RESPIRATION RATE: 16 BRPM | HEIGHT: 73 IN

## 2024-07-24 DIAGNOSIS — E11.610 TYPE 2 DIABETES MELLITUS WITH DIABETIC NEUROPATHIC ARTHROPATHY: ICD-10-CM

## 2024-07-24 DIAGNOSIS — L97.412 NON-PRESSURE CHRONIC ULCER OF RIGHT HEEL AND MIDFOOT WITH FAT LAYER EXPOSED: ICD-10-CM

## 2024-07-24 PROCEDURE — 99213 OFFICE O/P EST LOW 20 MIN: CPT | Mod: 24

## 2024-07-24 NOTE — HISTORY OF PRESENT ILLNESS
[FreeTextEntry1] : Patient is 58 year old male presents for follow-up status post right foot midfoot plantar exostectomy. Patient relates the right foot dressing was noted with bloody drainage and has been started on Eliquis per cardiologist for new onset Afib. Patient relates that he has been staying off of his right foot and has been utilizing a knee scooter.  7/24/2024 patient seen for follow-up status post right midfoot plantar exostectomy. Pt being seen for right lateral midfoot wound down to skin, subcutaneous tissue, fat from dehiscence, s/p delayed primary closure.

## 2024-07-24 NOTE — ASSESSMENT
[] : No [FreeTextEntry2] : Infection prevention Localized wound care Promote optimal skin integrity  Maintain acceptable level of pain with use of pharmacological and nonpharmacological interventions Offloading / Pressure relief  Nutrition to promote wound healing Edema control: Low sodium diet, elevation, and compression  Diabetic diet  Post surgical care [FreeTextEntry4] : F/U 1 week for an assessment and 2x for dressing changes Delayed primary closure not done today, sutures remain intact. Will address need at next visit

## 2024-07-24 NOTE — REVIEW OF SYSTEMS
[Fever] : no fever [Chills] : no chills [Eye Pain] : no eye pain [Red Eyes] : eyes not red [Earache] : no earache [Nosebleeds] : no nosebleeds [Chest Pain] : no chest pain [Shortness Of Breath] : no shortness of breath [Cough] : no cough [Abdominal Pain] : no abdominal pain [Vomiting] : no vomiting [Diarrhea] : no diarrhea [Joint Swelling] : no joint swelling [Limb Pain] : no limb pain [Skin Lesions] : skin lesion [Skin Wound] : no skin wound [Anxiety] : no anxiety [Easy Bleeding] : no tendency for easy bleeding [FreeTextEntry5] : HTN,HLD [FreeTextEntry7] : 40.9 BMI , morbid obesity  [FreeTextEntry8] : Kidney Transplant  [FreeTextEntry9] : Associated Right Diabetic Charcot foot status post right Achilles tendon lengthening, right posterior tibial tendon lengthening, right peroneus brevis tendon detachment and reattachment with exostectomy of the right fifth metatarsal base and plantar cuboid, and right fifth digit proximal phalanx base resection [de-identified] : Right plantar midfoot ulcer down to fat ,right lateral surgical site with sutures intact , no dehiscence     erbdvv gkl              ] [de-identified] : IDDM with neuropathy  [de-identified] : MARILUZ

## 2024-07-24 NOTE — PLAN
[FreeTextEntry1] : Patient examined and evaluated at this time. Continue with local wound care and offloading. Patient remains at risk for infection, sepsis, limb loss, and death. Spent 20 minutes for patient care and medical decision making.

## 2024-07-24 NOTE — PHYSICAL EXAM
[0] : left 0 [1+] : left 1+ [Varicose Veins Of Lower Extremities] : not present [] : not present [Purpura] : no purpura  [Petechiae] : no petechiae [Skin Ulcer] : ulcer [Skin Induration] : no induration [Alert] : alert [Oriented to Person] : oriented to person [Oriented to Place] : oriented to place [Oriented to Time] : oriented to time [Calm] : calm [de-identified] : A&Ox3, NAD [de-identified] : HTN, HLD [de-identified] : Diabetic Charcot right foot deformity status post right Achilles tendon lengthening, right posterior tibial tendon lengthening, right peroneus brevis tendon detachment and reattachment with exostectomy of the right fifth metatarsal base and plantar cuboid, and right fifth digit proximal phalanx base resection [de-identified] : Right plantar midfoot ulcer healed. right lateral foot surgical site with central dehiscence and open wound down to skin, subcutaneous tissue, fat, no periwound erythema, sutures intact [de-identified] : Diabetic neuropathy  [de-identified] : Circ neuromuscular function WNL post coban lite compression application, pt expressed comfort. [FreeTextEntry1] : status post r0ight midfoot plantar exostectomy- sutures intact  [FreeTextEntry2] : 0.3 [FreeTextEntry3] : 3.1 [FreeTextEntry4] : 0.8 [de-identified] : Serosanguineous [de-identified] : Patient denies any pain or discomfort post Coban LITE application.  Neruo/Circ is WNL.  [de-identified] : Betadine-soaked gauze  [de-identified] : Mechanically cleansed with Sterile gauze and 0.9% Normal Saline [TWNoteComboBox4] : Moderate [TWNoteComboBox5] : No [TWNoteComboBox6] : Surgical [de-identified] : No [de-identified] : Macerated [de-identified] : None [de-identified] : None [de-identified] : 100% [de-identified] : No [de-identified] : Multilayer other compression wrap [de-identified] : 3x Weekly [de-identified] : Primary Dressing

## 2024-07-26 ENCOUNTER — APPOINTMENT (OUTPATIENT)
Dept: WOUND CARE | Facility: HOSPITAL | Age: 59
End: 2024-07-26
Payer: MEDICARE

## 2024-07-26 VITALS
BODY MASS INDEX: 41.75 KG/M2 | HEART RATE: 79 BPM | TEMPERATURE: 97.9 F | HEIGHT: 73 IN | RESPIRATION RATE: 16 BRPM | DIASTOLIC BLOOD PRESSURE: 69 MMHG | OXYGEN SATURATION: 92 % | SYSTOLIC BLOOD PRESSURE: 108 MMHG | WEIGHT: 315 LBS

## 2024-07-26 PROCEDURE — ZZZZZ: CPT

## 2024-07-29 ENCOUNTER — APPOINTMENT (OUTPATIENT)
Dept: WOUND CARE | Facility: HOSPITAL | Age: 59
End: 2024-07-29
Payer: MEDICARE

## 2024-07-29 ENCOUNTER — OUTPATIENT (OUTPATIENT)
Dept: OUTPATIENT SERVICES | Facility: HOSPITAL | Age: 59
LOS: 1 days | Discharge: HOME | End: 2024-07-29
Payer: MEDICARE

## 2024-07-29 VITALS
WEIGHT: 315 LBS | HEART RATE: 78 BPM | HEIGHT: 73 IN | SYSTOLIC BLOOD PRESSURE: 91 MMHG | DIASTOLIC BLOOD PRESSURE: 56 MMHG | OXYGEN SATURATION: 92 % | BODY MASS INDEX: 41.75 KG/M2 | RESPIRATION RATE: 18 BRPM | TEMPERATURE: 99 F

## 2024-07-29 DIAGNOSIS — L97.412 NON-PRESSURE CHRONIC ULCER OF RIGHT HEEL AND MIDFOOT WITH FAT LAYER EXPOSED: ICD-10-CM

## 2024-07-29 DIAGNOSIS — T81.89XD OTHER COMPLICATIONS OF PROCEDURES, NOT ELSEWHERE CLASSIFIED, SUBSEQUENT ENCOUNTER: ICD-10-CM

## 2024-07-29 DIAGNOSIS — E11.621 TYPE 2 DIABETES MELLITUS WITH FOOT ULCER: ICD-10-CM
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
## 2024-07-29 DIAGNOSIS — Z98.890 OTHER SPECIFIED POSTPROCEDURAL STATES: Chronic | ICD-10-CM

## 2024-07-29 PROCEDURE — 29581 APPL MULTLAYER CMPRN SYS LEG: CPT | Mod: RT

## 2024-07-29 PROCEDURE — ZZZZZ: CPT

## 2024-07-31 ENCOUNTER — OUTPATIENT (OUTPATIENT)
Dept: OUTPATIENT SERVICES | Facility: HOSPITAL | Age: 59
LOS: 1 days | Discharge: HOME | End: 2024-07-31
Payer: MEDICARE

## 2024-07-31 ENCOUNTER — APPOINTMENT (OUTPATIENT)
Dept: WOUND CARE | Facility: HOSPITAL | Age: 59
End: 2024-07-31
Payer: MEDICARE

## 2024-07-31 VITALS
DIASTOLIC BLOOD PRESSURE: 62 MMHG | HEIGHT: 73 IN | WEIGHT: 315 LBS | RESPIRATION RATE: 18 BRPM | OXYGEN SATURATION: 93 % | BODY MASS INDEX: 41.75 KG/M2 | TEMPERATURE: 98.9 F | HEART RATE: 80 BPM | SYSTOLIC BLOOD PRESSURE: 131 MMHG

## 2024-07-31 DIAGNOSIS — E11.621 TYPE 2 DIABETES MELLITUS WITH FOOT ULCER: ICD-10-CM

## 2024-07-31 DIAGNOSIS — L84 CORNS AND CALLOSITIES: ICD-10-CM

## 2024-07-31 DIAGNOSIS — Z89.421 ACQUIRED ABSENCE OF OTHER RIGHT TOE(S): Chronic | ICD-10-CM

## 2024-07-31 DIAGNOSIS — Z98.890 OTHER SPECIFIED POSTPROCEDURAL STATES: Chronic | ICD-10-CM

## 2024-07-31 DIAGNOSIS — N18.6 END STAGE RENAL DISEASE: Chronic | ICD-10-CM

## 2024-07-31 DIAGNOSIS — T81.89XD OTHER COMPLICATIONS OF PROCEDURES, NOT ELSEWHERE CLASSIFIED, SUBSEQUENT ENCOUNTER: ICD-10-CM

## 2024-07-31 PROCEDURE — 29581 APPL MULTLAYER CMPRN SYS LEG: CPT | Mod: RT

## 2024-07-31 PROCEDURE — 99213 OFFICE O/P EST LOW 20 MIN: CPT | Mod: 24

## 2024-07-31 NOTE — PHYSICAL EXAM
[0] : left 0 [1+] : left 1+ [Skin Ulcer] : ulcer [Alert] : alert [Oriented to Person] : oriented to person [Oriented to Place] : oriented to place [Oriented to Time] : oriented to time [Calm] : calm [Varicose Veins Of Lower Extremities] : not present [] : not present [Purpura] : no purpura  [Petechiae] : no petechiae [Skin Induration] : no induration [de-identified] : A&Ox3, NAD [de-identified] : HTN, HLD [de-identified] : Diabetic Charcot right foot deformity status post right Achilles tendon lengthening, right posterior tibial tendon lengthening, right peroneus brevis tendon detachment and reattachment with exostectomy of the right fifth metatarsal base and plantar cuboid, and right fifth digit proximal phalanx base resection [de-identified] : Right plantar midfoot ulcer healed. right lateral foot surgical site with central dehiscence and open wound down to skin, subcutaneous tissue, fat, no periwound erythema, sutures intact [de-identified] : Diabetic neuropathy  [4 x 4] : 4 x 4  [Abdominal Pad] : Abdominal Pad [FreeTextEntry1] : Right midfoot plantar- Delayed primary closure site 7/17/24 - sutures intact with wound dehiscence   [FreeTextEntry2] : 2.3  [FreeTextEntry3] : 0.2 [FreeTextEntry4] : 0.4 [de-identified] : Serous/sanguinous [de-identified] : coban light. Expressed comfort post Coban application. [de-identified] : betadine soaked gauze [de-identified] : Mechanically cleansed with sterile gauze and normal saline. Kerlix  [TWNoteComboBox4] : Small [de-identified] : Macerated [de-identified] : None [de-identified] : None [de-identified] : >75% [de-identified] : No [de-identified] : Multilayer other compression wrap [de-identified] : 3x Weekly [de-identified] : Primary Dressing

## 2024-07-31 NOTE — REVIEW OF SYSTEMS
[Skin Lesions] : skin lesion [Fever] : no fever [Chills] : no chills [Eye Pain] : no eye pain [Red Eyes] : eyes not red [Earache] : no earache [Nosebleeds] : no nosebleeds [Chest Pain] : no chest pain [Shortness Of Breath] : no shortness of breath [Cough] : no cough [Abdominal Pain] : no abdominal pain [Vomiting] : no vomiting [Diarrhea] : no diarrhea [Joint Swelling] : no joint swelling [Limb Pain] : no limb pain [Skin Wound] : no skin wound [Anxiety] : no anxiety [Easy Bleeding] : no tendency for easy bleeding [FreeTextEntry5] : HTN,HLD [FreeTextEntry7] : 40.9 BMI , morbid obesity  [FreeTextEntry8] : Kidney Transplant  [FreeTextEntry9] : Associated Right Diabetic Charcot foot status post right Achilles tendon lengthening, right posterior tibial tendon lengthening, right peroneus brevis tendon detachment and reattachment with exostectomy of the right fifth metatarsal base and plantar cuboid, and right fifth digit proximal phalanx base resection [de-identified] : Right plantar midfoot ulcer down to fat ,right lateral surgical site with sutures intact , no dehiscence     erbdvv gkl              ] [de-identified] : IDDM with neuropathy  [de-identified] : MARILUZ

## 2024-07-31 NOTE — ASSESSMENT
[Verbal] : Verbal [Written] : Written [Demo] : Demo [Patient] : Patient [Family member] : Family member [Good - alert, interested, motivated] : Good - alert, interested, motivated [Verbalizes knowledge/Understanding] : Verbalizes knowledge/understanding [Dressing changes] : dressing changes [Foot Care] : foot care [Skin Care] : skin care [Signs and symptoms of infection] : sign and symptoms of infection [Nutrition] : nutrition [How and When to Call] : how and when to call [Pain Management] : pain management [Compression Therapy] : compression therapy [Patient responsibility to plan of care] : patient responsibility to plan of care [Glycemic Control] : glycemic control [] : Yes [FreeTextEntry2] : Infection prevention Localized wound care  Goal remaining pain free regarding wounds Compression therapy  Low glycemic diet and weight loss   [FreeTextEntry4] : Sutures to remain in place until next follow up. Possible delayed primary closure at next assessment.  dressing changes at [facility]. dressing change 8/2/24.  Assessment in 1 week  [Stable] : stable [Home] : Home [Other: ____] : [unfilled] [Not Applicable - Long Term Care/Home Health Agency] : Long Term Care/Home Health Agency: Not Applicable

## 2024-07-31 NOTE — HISTORY OF PRESENT ILLNESS
[FreeTextEntry1] : Patient is 58 year old male presents for follow-up status post right foot midfoot plantar exostectomy. Patient relates the right foot dressing was noted with bloody drainage and has been started on Eliquis per cardiologist for new onset Afib. Patient relates that he has been staying off of his right foot and has been utilizing a knee scooter.  7/31/2024 patient seen for follow-up status post right midfoot plantar exostectomy. Pt being seen for right lateral midfoot wound down to skin, subcutaneous tissue, fat from dehiscence, s/p delayed primary closure.

## 2024-08-01 DIAGNOSIS — Z79.899 OTHER LONG TERM (CURRENT) DRUG THERAPY: ICD-10-CM

## 2024-08-01 DIAGNOSIS — Z94.0 KIDNEY TRANSPLANT STATUS: ICD-10-CM

## 2024-08-01 DIAGNOSIS — E11.610 TYPE 2 DIABETES MELLITUS WITH DIABETIC NEUROPATHIC ARTHROPATHY: ICD-10-CM
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
## 2024-08-01 DIAGNOSIS — Z79.4 LONG TERM (CURRENT) USE OF INSULIN: ICD-10-CM

## 2024-08-01 DIAGNOSIS — T81.89XD OTHER COMPLICATIONS OF PROCEDURES, NOT ELSEWHERE CLASSIFIED, SUBSEQUENT ENCOUNTER: ICD-10-CM

## 2024-08-01 DIAGNOSIS — Z89.421 ACQUIRED ABSENCE OF OTHER RIGHT TOE(S): ICD-10-CM

## 2024-08-01 DIAGNOSIS — Z86.16 PERSONAL HISTORY OF COVID-19: ICD-10-CM

## 2024-08-01 DIAGNOSIS — Z80.8 FAMILY HISTORY OF MALIGNANT NEOPLASM OF OTHER ORGANS OR SYSTEMS: ICD-10-CM

## 2024-08-01 DIAGNOSIS — Z80.3 FAMILY HISTORY OF MALIGNANT NEOPLASM OF BREAST: ICD-10-CM

## 2024-08-01 DIAGNOSIS — Z85.828 PERSONAL HISTORY OF OTHER MALIGNANT NEOPLASM OF SKIN: ICD-10-CM

## 2024-08-01 DIAGNOSIS — L97.412 NON-PRESSURE CHRONIC ULCER OF RIGHT HEEL AND MIDFOOT WITH FAT LAYER EXPOSED: ICD-10-CM

## 2024-08-01 DIAGNOSIS — G47.33 OBSTRUCTIVE SLEEP APNEA (ADULT) (PEDIATRIC): ICD-10-CM

## 2024-08-01 DIAGNOSIS — Z89.422 ACQUIRED ABSENCE OF OTHER LEFT TOE(S): ICD-10-CM

## 2024-08-01 DIAGNOSIS — E11.621 TYPE 2 DIABETES MELLITUS WITH FOOT ULCER: ICD-10-CM

## 2024-08-01 DIAGNOSIS — Z86.73 PERSONAL HISTORY OF TRANSIENT ISCHEMIC ATTACK (TIA), AND CEREBRAL INFARCTION WITHOUT RESIDUAL DEFICITS: ICD-10-CM

## 2024-08-01 DIAGNOSIS — Z87.81 PERSONAL HISTORY OF (HEALED) TRAUMATIC FRACTURE: ICD-10-CM

## 2024-08-01 DIAGNOSIS — Z86.718 PERSONAL HISTORY OF OTHER VENOUS THROMBOSIS AND EMBOLISM: ICD-10-CM

## 2024-08-01 DIAGNOSIS — E78.5 HYPERLIPIDEMIA, UNSPECIFIED: ICD-10-CM

## 2024-08-01 DIAGNOSIS — Z79.01 LONG TERM (CURRENT) USE OF ANTICOAGULANTS: ICD-10-CM
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
## 2024-08-02 ENCOUNTER — OUTPATIENT (OUTPATIENT)
Dept: OUTPATIENT SERVICES | Facility: HOSPITAL | Age: 59
LOS: 1 days | Discharge: HOME | End: 2024-08-02
Payer: MEDICARE

## 2024-08-02 ENCOUNTER — APPOINTMENT (OUTPATIENT)
Dept: WOUND CARE | Facility: HOSPITAL | Age: 59
End: 2024-08-02
Payer: MEDICARE

## 2024-08-02 VITALS
TEMPERATURE: 98 F | DIASTOLIC BLOOD PRESSURE: 76 MMHG | OXYGEN SATURATION: 94 % | HEART RATE: 69 BPM | SYSTOLIC BLOOD PRESSURE: 117 MMHG | HEIGHT: 73 IN | BODY MASS INDEX: 41.75 KG/M2 | RESPIRATION RATE: 18 BRPM | WEIGHT: 315 LBS

## 2024-08-02 DIAGNOSIS — N18.6 END STAGE RENAL DISEASE: Chronic | ICD-10-CM

## 2024-08-02 DIAGNOSIS — Z94.0 KIDNEY TRANSPLANT STATUS: Chronic | ICD-10-CM

## 2024-08-02 DIAGNOSIS — Z98.890 OTHER SPECIFIED POSTPROCEDURAL STATES: Chronic | ICD-10-CM

## 2024-08-02 DIAGNOSIS — E11.621 TYPE 2 DIABETES MELLITUS WITH FOOT ULCER: ICD-10-CM

## 2024-08-02 DIAGNOSIS — Z89.421 ACQUIRED ABSENCE OF OTHER RIGHT TOE(S): Chronic | ICD-10-CM

## 2024-08-02 PROCEDURE — 29581 APPL MULTLAYER CMPRN SYS LEG: CPT | Mod: RT

## 2024-08-02 PROCEDURE — ZZZZZ: CPT

## 2024-08-05 ENCOUNTER — APPOINTMENT (OUTPATIENT)
Dept: WOUND CARE | Facility: HOSPITAL | Age: 59
End: 2024-08-05

## 2024-08-05 ENCOUNTER — OUTPATIENT (OUTPATIENT)
Dept: OUTPATIENT SERVICES | Facility: HOSPITAL | Age: 59
LOS: 1 days | Discharge: HOME | End: 2024-08-05
Payer: MEDICARE

## 2024-08-05 DIAGNOSIS — E11.621 TYPE 2 DIABETES MELLITUS WITH FOOT ULCER: ICD-10-CM

## 2024-08-05 DIAGNOSIS — T81.89XD OTHER COMPLICATIONS OF PROCEDURES, NOT ELSEWHERE CLASSIFIED, SUBSEQUENT ENCOUNTER: ICD-10-CM

## 2024-08-05 DIAGNOSIS — L97.412 NON-PRESSURE CHRONIC ULCER OF RIGHT HEEL AND MIDFOOT WITH FAT LAYER EXPOSED: ICD-10-CM

## 2024-08-05 PROCEDURE — ZZZZZ: CPT

## 2024-08-05 PROCEDURE — 29581 APPL MULTLAYER CMPRN SYS LEG: CPT | Mod: RT

## 2024-08-07 ENCOUNTER — NON-APPOINTMENT (OUTPATIENT)
Age: 59
End: 2024-08-07

## 2024-08-07 ENCOUNTER — APPOINTMENT (OUTPATIENT)
Dept: WOUND CARE | Facility: HOSPITAL | Age: 59
End: 2024-08-07

## 2024-08-07 ENCOUNTER — OUTPATIENT (OUTPATIENT)
Dept: OUTPATIENT SERVICES | Facility: HOSPITAL | Age: 59
LOS: 1 days | Discharge: HOME | End: 2024-08-07
Payer: MEDICARE

## 2024-08-07 DIAGNOSIS — T81.89XD OTHER COMPLICATIONS OF PROCEDURES, NOT ELSEWHERE CLASSIFIED, SUBSEQUENT ENCOUNTER: ICD-10-CM

## 2024-08-07 DIAGNOSIS — Z94.0 KIDNEY TRANSPLANT STATUS: Chronic | ICD-10-CM

## 2024-08-07 DIAGNOSIS — N18.6 END STAGE RENAL DISEASE: Chronic | ICD-10-CM

## 2024-08-07 DIAGNOSIS — Z89.421 ACQUIRED ABSENCE OF OTHER RIGHT TOE(S): Chronic | ICD-10-CM

## 2024-08-07 DIAGNOSIS — Z98.890 OTHER SPECIFIED POSTPROCEDURAL STATES: Chronic | ICD-10-CM

## 2024-08-07 PROCEDURE — 12020 TX SUPFC WND DEHSN SMPL CLSR: CPT | Mod: 78

## 2024-08-07 PROCEDURE — 12020 TX SUPFC WND DEHSN SMPL CLSR: CPT

## 2024-08-08 DIAGNOSIS — Z89.422 ACQUIRED ABSENCE OF OTHER LEFT TOE(S): ICD-10-CM

## 2024-08-08 DIAGNOSIS — E11.621 TYPE 2 DIABETES MELLITUS WITH FOOT ULCER: ICD-10-CM

## 2024-08-08 DIAGNOSIS — Z86.73 PERSONAL HISTORY OF TRANSIENT ISCHEMIC ATTACK (TIA), AND CEREBRAL INFARCTION WITHOUT RESIDUAL DEFICITS: ICD-10-CM

## 2024-08-08 DIAGNOSIS — Z94.0 KIDNEY TRANSPLANT STATUS: ICD-10-CM

## 2024-08-08 DIAGNOSIS — L97.412 NON-PRESSURE CHRONIC ULCER OF RIGHT HEEL AND MIDFOOT WITH FAT LAYER EXPOSED: ICD-10-CM

## 2024-08-08 DIAGNOSIS — T81.89XA OTHER COMPLICATIONS OF PROCEDURES, NOT ELSEWHERE CLASSIFIED, INITIAL ENCOUNTER: ICD-10-CM

## 2024-08-08 DIAGNOSIS — Z86.718 PERSONAL HISTORY OF OTHER VENOUS THROMBOSIS AND EMBOLISM: ICD-10-CM

## 2024-08-08 DIAGNOSIS — Z87.81 PERSONAL HISTORY OF (HEALED) TRAUMATIC FRACTURE: ICD-10-CM

## 2024-08-08 DIAGNOSIS — Z79.01 LONG TERM (CURRENT) USE OF ANTICOAGULANTS: ICD-10-CM

## 2024-08-08 DIAGNOSIS — E11.610 TYPE 2 DIABETES MELLITUS WITH DIABETIC NEUROPATHIC ARTHROPATHY: ICD-10-CM

## 2024-08-08 DIAGNOSIS — G47.33 OBSTRUCTIVE SLEEP APNEA (ADULT) (PEDIATRIC): ICD-10-CM

## 2024-08-08 DIAGNOSIS — Z85.828 PERSONAL HISTORY OF OTHER MALIGNANT NEOPLASM OF SKIN: ICD-10-CM

## 2024-08-08 DIAGNOSIS — Z79.899 OTHER LONG TERM (CURRENT) DRUG THERAPY: ICD-10-CM

## 2024-08-08 DIAGNOSIS — Z89.421 ACQUIRED ABSENCE OF OTHER RIGHT TOE(S): ICD-10-CM

## 2024-08-08 DIAGNOSIS — E78.5 HYPERLIPIDEMIA, UNSPECIFIED: ICD-10-CM
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
## 2024-08-08 DIAGNOSIS — Z79.4 LONG TERM (CURRENT) USE OF INSULIN: ICD-10-CM
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
## 2024-08-08 DIAGNOSIS — Z86.16 PERSONAL HISTORY OF COVID-19: ICD-10-CM

## 2024-08-08 NOTE — REVIEW OF SYSTEMS
[FreeTextEntry1] : 2075 [Fever] : no fever [Chills] : no chills [Eye Pain] : no eye pain [Red Eyes] : eyes not red [Earache] : no earache [Nosebleeds] : no nosebleeds [Chest Pain] : no chest pain [Shortness Of Breath] : no shortness of breath [Cough] : no cough [Abdominal Pain] : no abdominal pain [Vomiting] : no vomiting [Diarrhea] : no diarrhea [Joint Swelling] : no joint swelling [Limb Pain] : no limb pain [Skin Wound] : no skin wound [Anxiety] : no anxiety [Easy Bleeding] : no tendency for easy bleeding [FreeTextEntry5] : HTN,HLD [FreeTextEntry7] : 40.9 BMI , morbid obesity  [FreeTextEntry8] : Kidney Transplant  [FreeTextEntry9] : Associated Right Diabetic Charcot foot status post right Achilles tendon lengthening, right posterior tibial tendon lengthening, right peroneus brevis tendon detachment and reattachment with exostectomy of the right fifth metatarsal base and plantar cuboid, and right fifth digit proximal phalanx base resection [de-identified] : Right plantar midfoot ulcer down to fat ,right lateral surgical site with sutures intact , no dehiscence     erbdvv gkl              ] [de-identified] : IDDM with neuropathy  [de-identified] : MARILUZ

## 2024-08-08 NOTE — PROCEDURE
[FreeTextEntry9] : 18:05 [de-identified] : right lateral foot surgical site with central dehiscence and open wound down to skin, subcutaneous tissue, fat, no periwound erythema  [de-identified] : carleen [de-identified] : chaka [FreeTextEntry7] : right lateral foot surgical site with central dehiscence and open wound down to skin, subcutaneous tissue, fat, no periwound erythema  [FreeTextEntry6] : right lateral foot surgical site with central dehiscence and open wound down to skin, subcutaneous tissue, fat, no periwound erythema  [de-identified] : 3mL

## 2024-08-08 NOTE — ASSESSMENT
[Verbal] : Verbal [Written] : Written [Demo] : Demo [Patient] : Patient [Family member] : Family member [Good - alert, interested, motivated] : Good - alert, interested, motivated [Verbalizes knowledge/Understanding] : Verbalizes knowledge/understanding [Dressing changes] : dressing changes [Foot Care] : foot care [Skin Care] : skin care [Signs and symptoms of infection] : sign and symptoms of infection [Nutrition] : nutrition [How and When to Call] : how and when to call [Pain Management] : pain management [Compression Therapy] : compression therapy [Patient responsibility to plan of care] : patient responsibility to plan of care [Glycemic Control] : glycemic control [Stable] : stable [Home] : Home [Other: ____] : [unfilled] [Not Applicable - Long Term Care/Home Health Agency] : Long Term Care/Home Health Agency: Not Applicable [] : No [FreeTextEntry2] : Infection prevention S/S of infection Localized wound care Promote optimal skin integrity  Maintain acceptable level of pain with use of pharmacological and nonpharmacological interventions Offloading / Pressure relief  Nutrition to promote wound healing Edema control: Low sodium diet, elevation, and compression  Sharp debridement Diabetic diet  Post surgical care regular f/u with primary medical teams [FreeTextEntry3] : Increase in wound size  [FreeTextEntry4] : Delayed primary closure completed today. Patient to return 8/9/24 and 8/12/24 for dressing changes.  DPM see the wound during dressing changes F/U 1 week for assessment. .

## 2024-08-08 NOTE — PHYSICAL EXAM
[Abdominal Pad] : Abdominal Pad [4 x 4] : 4 x 4  [FreeTextEntry3] : 0.3 [Varicose Veins Of Lower Extremities] : not present [] : not present [Purpura] : no purpura  [Petechiae] : no petechiae [Skin Induration] : no induration [de-identified] : A&Ox3, NAD [de-identified] : HTN, HLD [de-identified] : Diabetic Charcot right foot deformity status post right Achilles tendon lengthening, right posterior tibial tendon lengthening, right peroneus brevis tendon detachment and reattachment with exostectomy of the right fifth metatarsal base and plantar cuboid, and right fifth digit proximal phalanx base resection [de-identified] : Right plantar midfoot ulcer healed. right lateral foot surgical site with central dehiscence and open wound down to skin, subcutaneous tissue, fat, no periwound erythema, sutures intact [de-identified] : Diabetic neuropathy  [FreeTextEntry1] : Right lateral foot- DPC 8/7/24 [FreeTextEntry2] : 3.0 [FreeTextEntry4] : Sutures with well approximated edges [de-identified] : Sanguineous [de-identified] : NONE [de-identified] : Delayed primary closure  [de-identified] : Patient denies any pain or discomfort post Coban LITE application.  Neruo/Circ is WNL.  [de-identified] : Betadine-soaked gauze  [de-identified] : Cleansed with 0.9% Normal Saline  Kerlix  2.0 Monocryl suture used by KYLE   [TWNoteComboBox4] : Moderate [TWNoteComboBox5] : No [TWNoteComboBox6] : Surgical [de-identified] : Macerated [de-identified] : No [de-identified] : None [de-identified] : None [de-identified] : 100% [de-identified] : No [de-identified] : Multilayer other compression wrap [de-identified] : 3x Weekly [de-identified] : Primary Dressing

## 2024-08-08 NOTE — PHYSICAL EXAM
[Abdominal Pad] : Abdominal Pad [4 x 4] : 4 x 4  [FreeTextEntry3] : 0.3 [Varicose Veins Of Lower Extremities] : not present [] : not present [Purpura] : no purpura  [Petechiae] : no petechiae [Skin Induration] : no induration [de-identified] : A&Ox3, NAD [de-identified] : HTN, HLD [de-identified] : Diabetic Charcot right foot deformity status post right Achilles tendon lengthening, right posterior tibial tendon lengthening, right peroneus brevis tendon detachment and reattachment with exostectomy of the right fifth metatarsal base and plantar cuboid, and right fifth digit proximal phalanx base resection [de-identified] : Right plantar midfoot ulcer healed. right lateral foot surgical site with central dehiscence and open wound down to skin, subcutaneous tissue, fat, no periwound erythema, sutures intact [de-identified] : Diabetic neuropathy  [FreeTextEntry1] : Right lateral foot- DPC 8/7/24 [FreeTextEntry2] : 3.0 [FreeTextEntry4] : Sutures with well approximated edges [de-identified] : Sanguineous [de-identified] : NONE [de-identified] : Delayed primary closure  [de-identified] : Patient denies any pain or discomfort post Coban LITE application.  Neruo/Circ is WNL.  [de-identified] : Betadine-soaked gauze  [de-identified] : Cleansed with 0.9% Normal Saline  Kerlix  2.0 Monocryl suture used by KYLE   [TWNoteComboBox4] : Moderate [TWNoteComboBox5] : No [TWNoteComboBox6] : Surgical [de-identified] : Macerated [de-identified] : No [de-identified] : None [de-identified] : None [de-identified] : 100% [de-identified] : No [de-identified] : Multilayer other compression wrap [de-identified] : 3x Weekly [de-identified] : Primary Dressing

## 2024-08-08 NOTE — PROCEDURE
[FreeTextEntry9] : 18:05 [de-identified] : right lateral foot surgical site with central dehiscence and open wound down to skin, subcutaneous tissue, fat, no periwound erythema  [de-identified] : carleen [de-identified] : chaka [FreeTextEntry7] : right lateral foot surgical site with central dehiscence and open wound down to skin, subcutaneous tissue, fat, no periwound erythema  [FreeTextEntry6] : right lateral foot surgical site with central dehiscence and open wound down to skin, subcutaneous tissue, fat, no periwound erythema  [de-identified] : 3mL

## 2024-08-08 NOTE — VITALS
[] : No [de-identified] : Patient reports pain 0/10 regarding wound  [FreeTextEntry5] : discontinued aspirin as per cardiologist recommendation

## 2024-08-08 NOTE — REVIEW OF SYSTEMS
[FreeTextEntry1] : 8903 [Fever] : no fever [Chills] : no chills [Eye Pain] : no eye pain [Red Eyes] : eyes not red [Earache] : no earache [Nosebleeds] : no nosebleeds [Chest Pain] : no chest pain [Shortness Of Breath] : no shortness of breath [Cough] : no cough [Abdominal Pain] : no abdominal pain [Vomiting] : no vomiting [Diarrhea] : no diarrhea [Joint Swelling] : no joint swelling [Limb Pain] : no limb pain [Skin Wound] : no skin wound [Anxiety] : no anxiety [Easy Bleeding] : no tendency for easy bleeding [FreeTextEntry5] : HTN,HLD [FreeTextEntry7] : 40.9 BMI , morbid obesity  [FreeTextEntry8] : Kidney Transplant  [FreeTextEntry9] : Associated Right Diabetic Charcot foot status post right Achilles tendon lengthening, right posterior tibial tendon lengthening, right peroneus brevis tendon detachment and reattachment with exostectomy of the right fifth metatarsal base and plantar cuboid, and right fifth digit proximal phalanx base resection [de-identified] : Right plantar midfoot ulcer down to fat ,right lateral surgical site with sutures intact , no dehiscence     erbdvv gkl              ] [de-identified] : IDDM with neuropathy  [de-identified] : MARILUZ

## 2024-08-08 NOTE — VITALS
[] : No [de-identified] : Patient reports pain 0/10 regarding wound  [FreeTextEntry5] : discontinued aspirin as per cardiologist recommendation

## 2024-08-09 ENCOUNTER — APPOINTMENT (OUTPATIENT)
Dept: WOUND CARE | Facility: HOSPITAL | Age: 59
End: 2024-08-09

## 2024-08-09 ENCOUNTER — OUTPATIENT (OUTPATIENT)
Dept: OUTPATIENT SERVICES | Facility: HOSPITAL | Age: 59
LOS: 1 days | Discharge: HOME | End: 2024-08-09
Payer: MEDICARE

## 2024-08-09 DIAGNOSIS — Z89.421 ACQUIRED ABSENCE OF OTHER RIGHT TOE(S): Chronic | ICD-10-CM

## 2024-08-09 DIAGNOSIS — E11.621 TYPE 2 DIABETES MELLITUS WITH FOOT ULCER: ICD-10-CM
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
## 2024-08-09 DIAGNOSIS — T81.89XD OTHER COMPLICATIONS OF PROCEDURES, NOT ELSEWHERE CLASSIFIED, SUBSEQUENT ENCOUNTER: ICD-10-CM

## 2024-08-09 DIAGNOSIS — Z98.890 OTHER SPECIFIED POSTPROCEDURAL STATES: Chronic | ICD-10-CM

## 2024-08-09 DIAGNOSIS — N18.6 END STAGE RENAL DISEASE: Chronic | ICD-10-CM

## 2024-08-09 DIAGNOSIS — Z94.0 KIDNEY TRANSPLANT STATUS: Chronic | ICD-10-CM

## 2024-08-09 DIAGNOSIS — L97.412 NON-PRESSURE CHRONIC ULCER OF RIGHT HEEL AND MIDFOOT WITH FAT LAYER EXPOSED: ICD-10-CM
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
## 2024-08-09 PROCEDURE — ZZZZZ: CPT

## 2024-08-09 PROCEDURE — 29581 APPL MULTLAYER CMPRN SYS LEG: CPT | Mod: LT

## 2024-08-12 ENCOUNTER — APPOINTMENT (OUTPATIENT)
Dept: WOUND CARE | Facility: HOSPITAL | Age: 59
End: 2024-08-12
Payer: MEDICARE

## 2024-08-12 VITALS
OXYGEN SATURATION: 95 % | WEIGHT: 315 LBS | SYSTOLIC BLOOD PRESSURE: 108 MMHG | RESPIRATION RATE: 18 BRPM | HEART RATE: 76 BPM | TEMPERATURE: 99 F | DIASTOLIC BLOOD PRESSURE: 66 MMHG | HEIGHT: 73 IN | BODY MASS INDEX: 41.75 KG/M2

## 2024-08-12 PROCEDURE — ZZZZZ: CPT

## 2024-08-14 ENCOUNTER — APPOINTMENT (OUTPATIENT)
Dept: WOUND CARE | Facility: HOSPITAL | Age: 59
End: 2024-08-14

## 2024-08-14 VITALS
OXYGEN SATURATION: 95 % | DIASTOLIC BLOOD PRESSURE: 81 MMHG | WEIGHT: 315 LBS | TEMPERATURE: 99.8 F | BODY MASS INDEX: 41.75 KG/M2 | RESPIRATION RATE: 18 BRPM | HEART RATE: 76 BPM | HEIGHT: 73 IN | SYSTOLIC BLOOD PRESSURE: 134 MMHG

## 2024-08-14 DIAGNOSIS — T81.89XD OTHER COMPLICATIONS OF PROCEDURES, NOT ELSEWHERE CLASSIFIED, SUBSEQUENT ENCOUNTER: ICD-10-CM

## 2024-08-14 DIAGNOSIS — L97.412 NON-PRESSURE CHRONIC ULCER OF RIGHT HEEL AND MIDFOOT WITH FAT LAYER EXPOSED: ICD-10-CM

## 2024-08-14 DIAGNOSIS — E11.621 TYPE 2 DIABETES MELLITUS WITH FOOT ULCER: ICD-10-CM
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
## 2024-08-14 PROCEDURE — 99213 OFFICE O/P EST LOW 20 MIN: CPT | Mod: 24

## 2024-08-15 ENCOUNTER — NON-APPOINTMENT (OUTPATIENT)
Age: 59
End: 2024-08-15

## 2024-08-15 NOTE — ASSESSMENT
[Verbal] : Verbal [Demo] : Demo [Patient] : Patient Calm [Good - alert, interested, motivated] : Good - alert, interested, motivated [Verbalizes knowledge/Understanding] : Verbalizes knowledge/understanding [Dressing changes] : dressing changes [Foot Care] : foot care [Skin Care] : skin care [Pressure relief] : pressure relief [Signs and symptoms of infection] : sign and symptoms of infection [Nutrition] : nutrition [How and When to Call] : how and when to call [Off-loading] : off-loading [Compression Therapy] : compression therapy [Patient responsibility to plan of care] : patient responsibility to plan of care [Glycemic Control] : glycemic control [Stable] : stable [Home] : Home [Other: ____] : [unfilled] [Not Applicable - Long Term Care/Home Health Agency] : Long Term Care/Home Health Agency: Not Applicable [] : No [FreeTextEntry2] : Infection prevention Localized wound care Promote optimal skin integrity  Maintain acceptable level of pain with use of pharmacological and nonpharmacological interventions Offloading / Pressure relief  Daily foot care / monitoring  [FreeTextEntry3] : wound not approximated and depth noted  [FreeTextEntry4] : Follow up for an assessment in one week Patient to return to the [facility] for bandage changes 8/16/24 and 8/19/24 Authorization for sharp debridement was submitted today request for wound vac was submitted today.

## 2024-08-15 NOTE — PLAN
[FreeTextEntry1] : Patient examined and evaluated at this time. Continue with local wound care and offloading. Recommend wound vac to aid with wound healing process  Patient remains at risk for infection, sepsis, limb loss, and death. Spent 20 minutes for patient care and medical decision making.

## 2024-08-15 NOTE — HISTORY OF PRESENT ILLNESS
[FreeTextEntry1] : Patient is 58 year old male presents for follow-up  status post right midfoot plantar exostectomy. Pt being seen for right lateral midfoot wound down to skin, subcutaneous tissue, fat from dehiscence, s/p recent delayed primary closure.

## 2024-08-15 NOTE — PHYSICAL EXAM
[4 x 4] : 4 x 4  [0] : left 0 [1+] : left 1+ [Varicose Veins Of Lower Extremities] : not present [] : not present [Purpura] : no purpura  [Petechiae] : no petechiae [Skin Ulcer] : ulcer [Skin Induration] : no induration [Alert] : alert [Oriented to Person] : oriented to person [Oriented to Place] : oriented to place [Oriented to Time] : oriented to time [Calm] : calm [de-identified] : A&Ox3, NAD [de-identified] : HTN, HLD [de-identified] : Diabetic Charcot right foot deformity status post right Achilles tendon lengthening, right posterior tibial tendon lengthening, right peroneus brevis tendon detachment and reattachment with exostectomy of the right fifth metatarsal base and plantar cuboid, and right fifth digit proximal phalanx base resection [de-identified] : Right plantar midfoot ulcer healed. right lateral foot surgical site with central dehiscence of sutures with periwound maceration and serous drainage  [de-identified] : Diabetic neuropathy  [FreeTextEntry1] : Lateral foot - suture CDI  [FreeTextEntry2] : 3.0 [FreeTextEntry3] : 0.3 [FreeTextEntry4] : 0.5 [de-identified] : serosanguineous   [de-identified] : Patient denies any pain or discomfort post Coban LITE application.  Neruo/Circ is WNL. [de-identified] : Betadine-soaked gauze  [de-identified] : Cleansed with 0.9% Normal Saline  Kerlix  [TWNoteComboBox1] : Right [TWNoteComboBox4] : Moderate [TWNoteComboBox6] : Diabetic [de-identified] : Macerated [de-identified] : Mild [de-identified] : None [de-identified] : 100% [de-identified] : No [de-identified] : Multilayer other compression wrap [de-identified] : 3x Weekly

## 2024-08-16 ENCOUNTER — APPOINTMENT (OUTPATIENT)
Dept: WOUND CARE | Facility: HOSPITAL | Age: 59
End: 2024-08-16
Payer: MEDICARE

## 2024-08-16 VITALS
DIASTOLIC BLOOD PRESSURE: 75 MMHG | HEIGHT: 73 IN | BODY MASS INDEX: 41.75 KG/M2 | SYSTOLIC BLOOD PRESSURE: 128 MMHG | HEART RATE: 76 BPM | WEIGHT: 315 LBS | RESPIRATION RATE: 18 BRPM | TEMPERATURE: 98.7 F | OXYGEN SATURATION: 92 %

## 2024-08-16 PROCEDURE — ZZZZZ: CPT

## 2024-08-19 ENCOUNTER — OUTPATIENT (OUTPATIENT)
Dept: OUTPATIENT SERVICES | Facility: HOSPITAL | Age: 59
LOS: 1 days | Discharge: HOME | End: 2024-08-19
Payer: MEDICARE

## 2024-08-19 ENCOUNTER — NON-APPOINTMENT (OUTPATIENT)
Age: 59
End: 2024-08-19

## 2024-08-19 ENCOUNTER — APPOINTMENT (OUTPATIENT)
Dept: WOUND CARE | Facility: HOSPITAL | Age: 59
End: 2024-08-19
Payer: MEDICARE

## 2024-08-19 VITALS
HEART RATE: 76 BPM | BODY MASS INDEX: 41.75 KG/M2 | HEIGHT: 73 IN | SYSTOLIC BLOOD PRESSURE: 109 MMHG | TEMPERATURE: 98 F | DIASTOLIC BLOOD PRESSURE: 68 MMHG | WEIGHT: 315 LBS | RESPIRATION RATE: 18 BRPM | OXYGEN SATURATION: 92 %

## 2024-08-19 DIAGNOSIS — Z94.0 KIDNEY TRANSPLANT STATUS: Chronic | ICD-10-CM

## 2024-08-19 DIAGNOSIS — Z98.890 OTHER SPECIFIED POSTPROCEDURAL STATES: Chronic | ICD-10-CM

## 2024-08-19 DIAGNOSIS — L97.412 NON-PRESSURE CHRONIC ULCER OF RIGHT HEEL AND MIDFOOT WITH FAT LAYER EXPOSED: ICD-10-CM

## 2024-08-19 DIAGNOSIS — T81.89XD OTHER COMPLICATIONS OF PROCEDURES, NOT ELSEWHERE CLASSIFIED, SUBSEQUENT ENCOUNTER: ICD-10-CM

## 2024-08-19 DIAGNOSIS — N18.6 END STAGE RENAL DISEASE: Chronic | ICD-10-CM

## 2024-08-19 DIAGNOSIS — Z89.421 ACQUIRED ABSENCE OF OTHER RIGHT TOE(S): Chronic | ICD-10-CM

## 2024-08-19 PROCEDURE — 29581 APPL MULTLAYER CMPRN SYS LEG: CPT | Mod: RT

## 2024-08-19 PROCEDURE — 99213 OFFICE O/P EST LOW 20 MIN: CPT | Mod: 24

## 2024-08-19 PROCEDURE — ZZZZZ: CPT

## 2024-08-21 ENCOUNTER — APPOINTMENT (OUTPATIENT)
Dept: WOUND CARE | Facility: HOSPITAL | Age: 59
End: 2024-08-21
Payer: MEDICARE

## 2024-08-21 VITALS
BODY MASS INDEX: 41.75 KG/M2 | TEMPERATURE: 98.1 F | WEIGHT: 315 LBS | HEIGHT: 73 IN | SYSTOLIC BLOOD PRESSURE: 133 MMHG | HEART RATE: 93 BPM | DIASTOLIC BLOOD PRESSURE: 77 MMHG | RESPIRATION RATE: 18 BRPM | OXYGEN SATURATION: 93 %

## 2024-08-21 PROCEDURE — ZZZZZ: CPT

## 2024-08-23 ENCOUNTER — OUTPATIENT (OUTPATIENT)
Dept: OUTPATIENT SERVICES | Facility: HOSPITAL | Age: 59
LOS: 1 days | Discharge: HOME | End: 2024-08-23
Payer: MEDICARE

## 2024-08-23 ENCOUNTER — APPOINTMENT (OUTPATIENT)
Dept: WOUND CARE | Facility: HOSPITAL | Age: 59
End: 2024-08-23
Payer: MEDICARE

## 2024-08-23 VITALS
RESPIRATION RATE: 18 BRPM | HEIGHT: 73 IN | DIASTOLIC BLOOD PRESSURE: 74 MMHG | BODY MASS INDEX: 41.75 KG/M2 | OXYGEN SATURATION: 95 % | SYSTOLIC BLOOD PRESSURE: 107 MMHG | WEIGHT: 315 LBS | HEART RATE: 65 BPM | TEMPERATURE: 98.4 F

## 2024-08-23 DIAGNOSIS — Z98.890 OTHER SPECIFIED POSTPROCEDURAL STATES: Chronic | ICD-10-CM

## 2024-08-23 DIAGNOSIS — Z86.73 PERSONAL HISTORY OF TRANSIENT ISCHEMIC ATTACK (TIA), AND CEREBRAL INFARCTION WITHOUT RESIDUAL DEFICITS: ICD-10-CM

## 2024-08-23 DIAGNOSIS — L97.412 NON-PRESSURE CHRONIC ULCER OF RIGHT HEEL AND MIDFOOT WITH FAT LAYER EXPOSED: ICD-10-CM

## 2024-08-23 DIAGNOSIS — Z79.899 OTHER LONG TERM (CURRENT) DRUG THERAPY: ICD-10-CM

## 2024-08-23 DIAGNOSIS — E78.5 HYPERLIPIDEMIA, UNSPECIFIED: ICD-10-CM

## 2024-08-23 DIAGNOSIS — T81.89XD OTHER COMPLICATIONS OF PROCEDURES, NOT ELSEWHERE CLASSIFIED, SUBSEQUENT ENCOUNTER: ICD-10-CM

## 2024-08-23 DIAGNOSIS — E11.610 TYPE 2 DIABETES MELLITUS WITH DIABETIC NEUROPATHIC ARTHROPATHY: ICD-10-CM

## 2024-08-23 DIAGNOSIS — Z79.01 LONG TERM (CURRENT) USE OF ANTICOAGULANTS: ICD-10-CM

## 2024-08-23 DIAGNOSIS — N18.6 END STAGE RENAL DISEASE: Chronic | ICD-10-CM

## 2024-08-23 DIAGNOSIS — Z83.3 FAMILY HISTORY OF DIABETES MELLITUS: ICD-10-CM
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
## 2024-08-23 DIAGNOSIS — Z94.0 KIDNEY TRANSPLANT STATUS: ICD-10-CM

## 2024-08-23 DIAGNOSIS — Z89.421 ACQUIRED ABSENCE OF OTHER RIGHT TOE(S): ICD-10-CM

## 2024-08-23 DIAGNOSIS — Z89.422 ACQUIRED ABSENCE OF OTHER LEFT TOE(S): ICD-10-CM

## 2024-08-23 DIAGNOSIS — Z80.3 FAMILY HISTORY OF MALIGNANT NEOPLASM OF BREAST: ICD-10-CM

## 2024-08-23 DIAGNOSIS — Z80.8 FAMILY HISTORY OF MALIGNANT NEOPLASM OF OTHER ORGANS OR SYSTEMS: ICD-10-CM

## 2024-08-23 DIAGNOSIS — Z87.81 PERSONAL HISTORY OF (HEALED) TRAUMATIC FRACTURE: ICD-10-CM

## 2024-08-23 DIAGNOSIS — Z89.421 ACQUIRED ABSENCE OF OTHER RIGHT TOE(S): Chronic | ICD-10-CM

## 2024-08-23 DIAGNOSIS — E11.621 TYPE 2 DIABETES MELLITUS WITH FOOT ULCER: ICD-10-CM

## 2024-08-23 DIAGNOSIS — Z85.828 PERSONAL HISTORY OF OTHER MALIGNANT NEOPLASM OF SKIN: ICD-10-CM
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
## 2024-08-23 DIAGNOSIS — Z86.718 PERSONAL HISTORY OF OTHER VENOUS THROMBOSIS AND EMBOLISM: ICD-10-CM

## 2024-08-23 DIAGNOSIS — G47.33 OBSTRUCTIVE SLEEP APNEA (ADULT) (PEDIATRIC): ICD-10-CM

## 2024-08-23 DIAGNOSIS — Z79.4 LONG TERM (CURRENT) USE OF INSULIN: ICD-10-CM

## 2024-08-23 DIAGNOSIS — Z86.16 PERSONAL HISTORY OF COVID-19: ICD-10-CM

## 2024-08-23 PROCEDURE — ZZZZZ: CPT

## 2024-08-23 PROCEDURE — 29581 APPL MULTLAYER CMPRN SYS LEG: CPT | Mod: RT

## 2024-08-23 NOTE — REVIEW OF SYSTEMS
[Fever] : no fever [Chills] : no chills [Eye Pain] : no eye pain [Red Eyes] : eyes not red [Earache] : no earache [Nosebleeds] : no nosebleeds [Chest Pain] : no chest pain [Shortness Of Breath] : no shortness of breath [Cough] : no cough [Abdominal Pain] : no abdominal pain [Vomiting] : no vomiting [Diarrhea] : no diarrhea [Joint Swelling] : no joint swelling [Limb Pain] : no limb pain [Skin Lesions] : skin lesion [Skin Wound] : no skin wound [Anxiety] : no anxiety [Easy Bleeding] : no tendency for easy bleeding [FreeTextEntry5] : HTN,HLD [FreeTextEntry7] : 40.9 BMI , morbid obesity  [FreeTextEntry8] : Kidney Transplant  [FreeTextEntry9] : Associated Right Diabetic Charcot foot status post right Achilles tendon lengthening, right posterior tibial tendon lengthening, right peroneus brevis tendon detachment and reattachment with exostectomy of the right fifth metatarsal base and plantar cuboid, and right fifth digit proximal phalanx base resection [de-identified] : Right plantar midfoot ulcer down to fat ,right lateral surgical site with sutures intact , no dehiscence     erbdvv gkl              ] [de-identified] : IDDM with neuropathy  [de-identified] : MARILUZ

## 2024-08-23 NOTE — ASSESSMENT
[Verbal] : Verbal [Demo] : Demo [Patient] : Patient [Good - alert, interested, motivated] : Good - alert, interested, motivated [Verbalizes knowledge/Understanding] : Verbalizes knowledge/understanding [Dressing changes] : dressing changes [Foot Care] : foot care [Skin Care] : skin care [Pressure relief] : pressure relief [Signs and symptoms of infection] : sign and symptoms of infection [Nutrition] : nutrition [How and When to Call] : how and when to call [Off-loading] : off-loading [Compression Therapy] : compression therapy [Patient responsibility to plan of care] : patient responsibility to plan of care [Glycemic Control] : glycemic control [Stable] : stable [Home] : Home [Other: ____] : [unfilled] [Not Applicable - Long Term Care/Home Health Agency] : Long Term Care/Home Health Agency: Not Applicable [] : No [FreeTextEntry2] : Infection prevention Localized wound care Promote optimal skin integrity  Maintain acceptable level of pain with use of pharmacological and nonpharmacological interventions Offloading / Pressure relief  Daily foot care / monitoring  [FreeTextEntry3] : Sutures pulled, surrounding maceration [FreeTextEntry4] : Follow up for an assessment on Wed 8/21/24 Authorization for sharp debridement was submitted last week, DPM to assess at next visit Request for wound vac canceled, as per DPM not needed.

## 2024-08-23 NOTE — PHYSICAL EXAM
[4 x 4] : 4 x 4  [Abdominal Pad] : Abdominal Pad [0] : left 0 [1+] : left 1+ [Varicose Veins Of Lower Extremities] : not present [] : not present [Purpura] : no purpura  [Petechiae] : no petechiae [Skin Ulcer] : ulcer [Skin Induration] : no induration [Alert] : alert [Oriented to Person] : oriented to person [Oriented to Place] : oriented to place [Oriented to Time] : oriented to time [Calm] : calm [de-identified] : A&Ox3, NAD [de-identified] : HTN, HLD [de-identified] : Diabetic Charcot right foot deformity status post right Achilles tendon lengthening, right posterior tibial tendon lengthening, right peroneus brevis tendon detachment and reattachment with exostectomy of the right fifth metatarsal base and plantar cuboid, and right fifth digit proximal phalanx base resection [de-identified] : Right plantar midfoot ulcer healed. right lateral foot surgical site with central dehiscence and open wound down to skin, subcutaneous tissue, fat, no periwound erythema, sutures intact [de-identified] : Diabetic neuropathy  [de-identified] : *DPM shaved surrounding callus  [FreeTextEntry1] : Lateral foot [FreeTextEntry2] : 3.0 [FreeTextEntry3] : 0.3 [FreeTextEntry4] : 0.5 [de-identified] : serosanguineous   [de-identified] : Patient expressed comport post application, Circ/Neuromuscular function WNL [de-identified] : Nai (Dry) [de-identified] :  Mechanically cleansed with NS 0.9%, Sterile Gauze Kerlix Coban (Regular)   **Betadine to Janet-wound [TWNoteComboBox1] : Right [TWNoteComboBox4] : Moderate [TWNoteComboBox5] : No [TWNoteComboBox6] : Diabetic [de-identified] : No [de-identified] : Macerated [de-identified] : Mild [de-identified] : None [de-identified] : 100% [de-identified] : No [de-identified] : Multilayer other compression wrap [de-identified] : 3x Weekly [de-identified] : Primary Dressing

## 2024-08-23 NOTE — PHYSICAL EXAM
[4 x 4] : 4 x 4  [Abdominal Pad] : Abdominal Pad [0] : left 0 [1+] : left 1+ [Varicose Veins Of Lower Extremities] : not present [] : not present [Purpura] : no purpura  [Petechiae] : no petechiae [Skin Ulcer] : ulcer [Skin Induration] : no induration [Alert] : alert [Oriented to Person] : oriented to person [Oriented to Place] : oriented to place [Oriented to Time] : oriented to time [Calm] : calm [de-identified] : A&Ox3, NAD [de-identified] : HTN, HLD [de-identified] : Diabetic Charcot right foot deformity status post right Achilles tendon lengthening, right posterior tibial tendon lengthening, right peroneus brevis tendon detachment and reattachment with exostectomy of the right fifth metatarsal base and plantar cuboid, and right fifth digit proximal phalanx base resection [de-identified] : Right plantar midfoot ulcer healed. right lateral foot surgical site with central dehiscence and open wound down to skin, subcutaneous tissue, fat, no periwound erythema, sutures intact [de-identified] : Diabetic neuropathy  [de-identified] : *DPM shaved surrounding callus  [FreeTextEntry1] : Lateral foot [FreeTextEntry3] : 0.3 [FreeTextEntry2] : 3.0 [FreeTextEntry4] : 0.5 [de-identified] : serosanguineous   [de-identified] : Patient expressed comport post application, Circ/Neuromuscular function WNL [de-identified] : Nai (Dry) [de-identified] :  Mechanically cleansed with NS 0.9%, Sterile Gauze Kerlix Coban (Regular)   **Betadine to Janet-wound [TWNoteComboBox1] : Right [TWNoteComboBox4] : Moderate [TWNoteComboBox5] : No [TWNoteComboBox6] : Diabetic [de-identified] : Macerated [de-identified] : No [de-identified] : Mild [de-identified] : None [de-identified] : 100% [de-identified] : No [de-identified] : Multilayer other compression wrap [de-identified] : 3x Weekly [de-identified] : Primary Dressing

## 2024-08-23 NOTE — HISTORY OF PRESENT ILLNESS
[FreeTextEntry1] : Patient is 58 year old male presents for follow-up status post right foot midfoot plantar exostectomy. Patient relates the right foot dressing was noted with bloody drainage and has been started on Eliquis per cardiologist for new onset Afib. Patient relates that he has been staying off of his right foot and has been utilizing a knee scooter.  8/19/2024 patient seen for follow-up status post right midfoot plantar exostectomy. Pt being seen for right lateral midfoot wound down to skin, subcutaneous tissue, fat from dehiscence, s/p delayed primary closure.

## 2024-08-26 ENCOUNTER — OUTPATIENT (OUTPATIENT)
Dept: OUTPATIENT SERVICES | Facility: HOSPITAL | Age: 59
LOS: 1 days | Discharge: HOME | End: 2024-08-26
Payer: MEDICARE

## 2024-08-26 ENCOUNTER — APPOINTMENT (OUTPATIENT)
Dept: WOUND CARE | Facility: HOSPITAL | Age: 59
End: 2024-08-26

## 2024-08-26 VITALS
WEIGHT: 315 LBS | HEIGHT: 73 IN | HEART RATE: 58 BPM | SYSTOLIC BLOOD PRESSURE: 103 MMHG | DIASTOLIC BLOOD PRESSURE: 68 MMHG | BODY MASS INDEX: 41.75 KG/M2 | OXYGEN SATURATION: 93 % | RESPIRATION RATE: 18 BRPM | TEMPERATURE: 98.7 F

## 2024-08-26 DIAGNOSIS — Z98.890 OTHER SPECIFIED POSTPROCEDURAL STATES: Chronic | ICD-10-CM

## 2024-08-26 DIAGNOSIS — T81.89XD OTHER COMPLICATIONS OF PROCEDURES, NOT ELSEWHERE CLASSIFIED, SUBSEQUENT ENCOUNTER: ICD-10-CM

## 2024-08-26 DIAGNOSIS — L97.412 NON-PRESSURE CHRONIC ULCER OF RIGHT HEEL AND MIDFOOT WITH FAT LAYER EXPOSED: ICD-10-CM

## 2024-08-26 DIAGNOSIS — N18.6 END STAGE RENAL DISEASE: Chronic | ICD-10-CM

## 2024-08-26 PROCEDURE — 29581 APPL MULTLAYER CMPRN SYS LEG: CPT | Mod: RT

## 2024-08-26 PROCEDURE — 99213 OFFICE O/P EST LOW 20 MIN: CPT

## 2024-08-26 NOTE — ASSESSMENT
[Verbal] : Verbal [Demo] : Demo [Patient] : Patient [Good - alert, interested, motivated] : Good - alert, interested, motivated [Verbalizes knowledge/Understanding] : Verbalizes knowledge/understanding [Dressing changes] : dressing changes [Foot Care] : foot care [Skin Care] : skin care [Pressure relief] : pressure relief [Signs and symptoms of infection] : sign and symptoms of infection [Nutrition] : nutrition [How and When to Call] : how and when to call [Off-loading] : off-loading [Compression Therapy] : compression therapy [Patient responsibility to plan of care] : patient responsibility to plan of care [Glycemic Control] : glycemic control [Stable] : stable [Home] : Home [Other: ____] : [unfilled] [Not Applicable - Long Term Care/Home Health Agency] : Long Term Care/Home Health Agency: Not Applicable [] : Yes [FreeTextEntry2] : Infection prevention Localized wound care Promote optimal skin integrity  Maintain acceptable level of pain with use of pharmacological and nonpharmacological interventions Offloading / Pressure relief  Daily foot care / monitoring  [FreeTextEntry4] : Follow up 2x for dsg changes 1 week for assessment

## 2024-08-26 NOTE — REVIEW OF SYSTEMS
[Fever] : no fever [Chills] : no chills [Eye Pain] : no eye pain [Red Eyes] : eyes not red [Earache] : no earache [Nosebleeds] : no nosebleeds [Chest Pain] : no chest pain [Shortness Of Breath] : no shortness of breath [Cough] : no cough [Abdominal Pain] : no abdominal pain [Vomiting] : no vomiting [Diarrhea] : no diarrhea [Joint Swelling] : no joint swelling [Limb Pain] : no limb pain [Skin Lesions] : skin lesion [Skin Wound] : no skin wound [Anxiety] : no anxiety [Easy Bleeding] : no tendency for easy bleeding [FreeTextEntry5] : HTN,HLD [FreeTextEntry7] : 40.9 BMI , morbid obesity  [FreeTextEntry8] : Kidney Transplant  [FreeTextEntry9] : Associated Right Diabetic Charcot foot status post right Achilles tendon lengthening, right posterior tibial tendon lengthening, right peroneus brevis tendon detachment and reattachment with exostectomy of the right fifth metatarsal base and plantar cuboid, and right fifth digit proximal phalanx base resection [de-identified] : Right lateral , base 5th metatarsal , clean , granular     erbdvv gkl              ] [de-identified] : IDDM with neuropathy  [de-identified] : MARILUZ

## 2024-08-26 NOTE — HISTORY OF PRESENT ILLNESS
[FreeTextEntry1] : s/p surgical intervention right foot , lateral ulcer base 5th metatarsal right foot , clean , granular and no soi

## 2024-08-26 NOTE — PHYSICAL EXAM
[4 x 4] : 4 x 4  [Abdominal Pad] : Abdominal Pad [0] : left 0 [1+] : left 1+ [Varicose Veins Of Lower Extremities] : not present [] : not present [Purpura] : no purpura  [Petechiae] : no petechiae [Skin Ulcer] : ulcer [Skin Induration] : no induration [Alert] : alert [Oriented to Person] : oriented to person [Oriented to Place] : oriented to place [Oriented to Time] : oriented to time [Calm] : calm [de-identified] : A&Ox3, NAD [de-identified] : HTN, HLD [de-identified] : Diabetic Charcot right foot deformity status post right Achilles tendon lengthening, right posterior tibial tendon lengthening, right peroneus brevis tendon detachment and reattachment with exostectomy of the right fifth metatarsal base and plantar cuboid, and right fifth digit proximal phalanx base resection [de-identified] : Right plantar midfoot ulcer healed. right lateral foot surgical site with central dehiscence and open wound down to skin, subcutaneous tissue, fat, no periwound erythema, sutures intact [de-identified] : Diabetic neuropathy  [de-identified] : *DPM shaved surrounding callus  [FreeTextEntry1] : Lateral foot [FreeTextEntry2] : 1.5 [FreeTextEntry3] : 0.8 [FreeTextEntry4] : 0.3 [de-identified] : Serosanguineous   [de-identified] : Mild [de-identified] : Patient expressed comport post application, Circ/Neuromuscular function WNL [de-identified] : Nai (Dry) [de-identified] :  Mechanically cleansed with NS 0.9%, Sterile Gauze Kerlix Coban (Regular)   **Betadine to Janet-wound [TWNoteComboBox1] : Right [TWNoteComboBox4] : Moderate [TWNoteComboBox5] : No [TWNoteComboBox6] : Diabetic [de-identified] : No [de-identified] : Macerated [de-identified] : None [de-identified] : None [de-identified] : 100% [de-identified] : No [de-identified] : Multilayer other compression wrap [de-identified] : 3x Weekly [de-identified] : Primary Dressing

## 2024-08-26 NOTE — PLAN
[FreeTextEntry1] : continue off loading and wound care  Spent 20 minutes for patient care and medical decision making.

## 2024-08-28 ENCOUNTER — OUTPATIENT (OUTPATIENT)
Dept: OUTPATIENT SERVICES | Facility: HOSPITAL | Age: 59
LOS: 1 days | Discharge: HOME | End: 2024-08-28
Payer: MEDICARE

## 2024-08-28 ENCOUNTER — APPOINTMENT (OUTPATIENT)
Dept: WOUND CARE | Facility: HOSPITAL | Age: 59
End: 2024-08-28
Payer: MEDICARE

## 2024-08-28 VITALS
HEIGHT: 73 IN | SYSTOLIC BLOOD PRESSURE: 169 MMHG | RESPIRATION RATE: 15 BRPM | BODY MASS INDEX: 41.75 KG/M2 | TEMPERATURE: 98.3 F | OXYGEN SATURATION: 97 % | WEIGHT: 315 LBS | DIASTOLIC BLOOD PRESSURE: 91 MMHG | HEART RATE: 88 BPM
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
## 2024-08-28 DIAGNOSIS — L97.412 NON-PRESSURE CHRONIC ULCER OF RIGHT HEEL AND MIDFOOT WITH FAT LAYER EXPOSED: ICD-10-CM

## 2024-08-28 DIAGNOSIS — E11.621 TYPE 2 DIABETES MELLITUS WITH FOOT ULCER: ICD-10-CM

## 2024-08-28 DIAGNOSIS — T81.89XD OTHER COMPLICATIONS OF PROCEDURES, NOT ELSEWHERE CLASSIFIED, SUBSEQUENT ENCOUNTER: ICD-10-CM

## 2024-08-28 DIAGNOSIS — Z79.4 LONG TERM (CURRENT) USE OF INSULIN: ICD-10-CM

## 2024-08-28 DIAGNOSIS — Z89.421 ACQUIRED ABSENCE OF OTHER RIGHT TOE(S): Chronic | ICD-10-CM

## 2024-08-28 DIAGNOSIS — G47.33 OBSTRUCTIVE SLEEP APNEA (ADULT) (PEDIATRIC): ICD-10-CM

## 2024-08-28 DIAGNOSIS — Z89.421 ACQUIRED ABSENCE OF OTHER RIGHT TOE(S): ICD-10-CM

## 2024-08-28 DIAGNOSIS — Z86.73 PERSONAL HISTORY OF TRANSIENT ISCHEMIC ATTACK (TIA), AND CEREBRAL INFARCTION WITHOUT RESIDUAL DEFICITS: ICD-10-CM

## 2024-08-28 DIAGNOSIS — N18.6 END STAGE RENAL DISEASE: Chronic | ICD-10-CM

## 2024-08-28 DIAGNOSIS — Z98.890 OTHER SPECIFIED POSTPROCEDURAL STATES: Chronic | ICD-10-CM

## 2024-08-28 DIAGNOSIS — L84 CORNS AND CALLOSITIES: ICD-10-CM

## 2024-08-28 DIAGNOSIS — Z86.718 PERSONAL HISTORY OF OTHER VENOUS THROMBOSIS AND EMBOLISM: ICD-10-CM

## 2024-08-28 DIAGNOSIS — E78.5 HYPERLIPIDEMIA, UNSPECIFIED: ICD-10-CM

## 2024-08-28 DIAGNOSIS — Z83.3 FAMILY HISTORY OF DIABETES MELLITUS: ICD-10-CM

## 2024-08-28 DIAGNOSIS — Z79.01 LONG TERM (CURRENT) USE OF ANTICOAGULANTS: ICD-10-CM

## 2024-08-28 DIAGNOSIS — Z86.16 PERSONAL HISTORY OF COVID-19: ICD-10-CM
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
## 2024-08-28 DIAGNOSIS — Z80.8 FAMILY HISTORY OF MALIGNANT NEOPLASM OF OTHER ORGANS OR SYSTEMS: ICD-10-CM

## 2024-08-28 DIAGNOSIS — Z94.0 KIDNEY TRANSPLANT STATUS: Chronic | ICD-10-CM

## 2024-08-28 DIAGNOSIS — Z80.3 FAMILY HISTORY OF MALIGNANT NEOPLASM OF BREAST: ICD-10-CM

## 2024-08-28 DIAGNOSIS — Z94.0 KIDNEY TRANSPLANT STATUS: ICD-10-CM

## 2024-08-28 DIAGNOSIS — E11.610 TYPE 2 DIABETES MELLITUS WITH DIABETIC NEUROPATHIC ARTHROPATHY: ICD-10-CM

## 2024-08-28 DIAGNOSIS — Z87.81 PERSONAL HISTORY OF (HEALED) TRAUMATIC FRACTURE: ICD-10-CM

## 2024-08-28 DIAGNOSIS — Z79.899 OTHER LONG TERM (CURRENT) DRUG THERAPY: ICD-10-CM

## 2024-08-28 DIAGNOSIS — Z85.828 PERSONAL HISTORY OF OTHER MALIGNANT NEOPLASM OF SKIN: ICD-10-CM

## 2024-08-28 DIAGNOSIS — Z89.422 ACQUIRED ABSENCE OF OTHER LEFT TOE(S): ICD-10-CM

## 2024-08-28 PROCEDURE — 29581 APPL MULTLAYER CMPRN SYS LEG: CPT | Mod: RT

## 2024-08-28 PROCEDURE — ZZZZZ: CPT

## 2024-08-30 ENCOUNTER — OUTPATIENT (OUTPATIENT)
Dept: OUTPATIENT SERVICES | Facility: HOSPITAL | Age: 59
LOS: 1 days | Discharge: HOME | End: 2024-08-30
Payer: MEDICARE

## 2024-08-30 ENCOUNTER — APPOINTMENT (OUTPATIENT)
Dept: WOUND CARE | Facility: HOSPITAL | Age: 59
End: 2024-08-30
Payer: MEDICARE

## 2024-08-30 VITALS
OXYGEN SATURATION: 90 % | HEART RATE: 82 BPM | TEMPERATURE: 98.2 F | RESPIRATION RATE: 18 BRPM | DIASTOLIC BLOOD PRESSURE: 73 MMHG | BODY MASS INDEX: 41.75 KG/M2 | SYSTOLIC BLOOD PRESSURE: 114 MMHG | WEIGHT: 315 LBS | HEIGHT: 73 IN

## 2024-08-30 DIAGNOSIS — T81.89XD OTHER COMPLICATIONS OF PROCEDURES, NOT ELSEWHERE CLASSIFIED, SUBSEQUENT ENCOUNTER: ICD-10-CM

## 2024-08-30 DIAGNOSIS — Z89.421 ACQUIRED ABSENCE OF OTHER RIGHT TOE(S): Chronic | ICD-10-CM

## 2024-08-30 DIAGNOSIS — E11.621 TYPE 2 DIABETES MELLITUS WITH FOOT ULCER: ICD-10-CM

## 2024-08-30 DIAGNOSIS — N18.6 END STAGE RENAL DISEASE: Chronic | ICD-10-CM

## 2024-08-30 DIAGNOSIS — Z94.0 KIDNEY TRANSPLANT STATUS: Chronic | ICD-10-CM

## 2024-08-30 DIAGNOSIS — Z98.890 OTHER SPECIFIED POSTPROCEDURAL STATES: Chronic | ICD-10-CM

## 2024-08-30 DIAGNOSIS — L97.412 NON-PRESSURE CHRONIC ULCER OF RIGHT HEEL AND MIDFOOT WITH FAT LAYER EXPOSED: ICD-10-CM
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
## 2024-08-30 PROCEDURE — ZZZZZ: CPT

## 2024-08-30 PROCEDURE — 29581 APPL MULTLAYER CMPRN SYS LEG: CPT | Mod: RT

## 2024-09-03 ENCOUNTER — APPOINTMENT (OUTPATIENT)
Dept: WOUND CARE | Facility: HOSPITAL | Age: 59
End: 2024-09-03
Payer: MEDICARE

## 2024-09-03 ENCOUNTER — OUTPATIENT (OUTPATIENT)
Dept: OUTPATIENT SERVICES | Facility: HOSPITAL | Age: 59
LOS: 1 days | Discharge: HOME | End: 2024-09-03
Payer: MEDICARE

## 2024-09-03 VITALS
BODY MASS INDEX: 41.75 KG/M2 | HEART RATE: 85 BPM | WEIGHT: 315 LBS | DIASTOLIC BLOOD PRESSURE: 82 MMHG | HEIGHT: 73 IN | RESPIRATION RATE: 18 BRPM | TEMPERATURE: 98.4 F | OXYGEN SATURATION: 95 % | SYSTOLIC BLOOD PRESSURE: 131 MMHG

## 2024-09-03 DIAGNOSIS — Z94.0 KIDNEY TRANSPLANT STATUS: Chronic | ICD-10-CM

## 2024-09-03 DIAGNOSIS — T81.89XD OTHER COMPLICATIONS OF PROCEDURES, NOT ELSEWHERE CLASSIFIED, SUBSEQUENT ENCOUNTER: ICD-10-CM

## 2024-09-03 DIAGNOSIS — E11.621 TYPE 2 DIABETES MELLITUS WITH FOOT ULCER: ICD-10-CM

## 2024-09-03 DIAGNOSIS — L97.412 NON-PRESSURE CHRONIC ULCER OF RIGHT HEEL AND MIDFOOT WITH FAT LAYER EXPOSED: ICD-10-CM

## 2024-09-03 DIAGNOSIS — Z89.421 ACQUIRED ABSENCE OF OTHER RIGHT TOE(S): Chronic | ICD-10-CM

## 2024-09-03 DIAGNOSIS — Z98.890 OTHER SPECIFIED POSTPROCEDURAL STATES: Chronic | ICD-10-CM
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
## 2024-09-03 DIAGNOSIS — N18.6 END STAGE RENAL DISEASE: Chronic | ICD-10-CM

## 2024-09-03 PROCEDURE — ZZZZZ: CPT

## 2024-09-03 PROCEDURE — 29581 APPL MULTLAYER CMPRN SYS LEG: CPT | Mod: RT

## 2024-09-05 ENCOUNTER — OUTPATIENT (OUTPATIENT)
Dept: OUTPATIENT SERVICES | Facility: HOSPITAL | Age: 59
LOS: 1 days | Discharge: HOME | End: 2024-09-05
Payer: MEDICARE

## 2024-09-05 ENCOUNTER — APPOINTMENT (OUTPATIENT)
Dept: WOUND CARE | Facility: HOSPITAL | Age: 59
End: 2024-09-05
Payer: MEDICARE

## 2024-09-05 VITALS
BODY MASS INDEX: 41.75 KG/M2 | RESPIRATION RATE: 14 BRPM | WEIGHT: 315 LBS | HEART RATE: 72 BPM | DIASTOLIC BLOOD PRESSURE: 74 MMHG | HEIGHT: 73 IN | SYSTOLIC BLOOD PRESSURE: 124 MMHG | TEMPERATURE: 97.7 F | OXYGEN SATURATION: 93 %

## 2024-09-05 DIAGNOSIS — T81.89XD OTHER COMPLICATIONS OF PROCEDURES, NOT ELSEWHERE CLASSIFIED, SUBSEQUENT ENCOUNTER: ICD-10-CM

## 2024-09-05 DIAGNOSIS — N18.6 END STAGE RENAL DISEASE: Chronic | ICD-10-CM

## 2024-09-05 DIAGNOSIS — L97.412 NON-PRESSURE CHRONIC ULCER OF RIGHT HEEL AND MIDFOOT WITH FAT LAYER EXPOSED: ICD-10-CM

## 2024-09-05 DIAGNOSIS — Z89.421 ACQUIRED ABSENCE OF OTHER RIGHT TOE(S): Chronic | ICD-10-CM

## 2024-09-05 DIAGNOSIS — Z98.890 OTHER SPECIFIED POSTPROCEDURAL STATES: Chronic | ICD-10-CM

## 2024-09-05 DIAGNOSIS — Z94.0 KIDNEY TRANSPLANT STATUS: Chronic | ICD-10-CM

## 2024-09-05 PROCEDURE — 29581 APPL MULTLAYER CMPRN SYS LEG: CPT | Mod: RT

## 2024-09-05 PROCEDURE — 99213 OFFICE O/P EST LOW 20 MIN: CPT

## 2024-09-05 NOTE — PHYSICAL EXAM
[4 x 4] : 4 x 4  [Abdominal Pad] : Abdominal Pad [0] : left 0 [1+] : left 1+ [Varicose Veins Of Lower Extremities] : not present [] : not present [Purpura] : no purpura  [Petechiae] : no petechiae [Skin Ulcer] : ulcer [Skin Induration] : no induration [Alert] : alert [Oriented to Person] : oriented to person [Oriented to Place] : oriented to place [Oriented to Time] : oriented to time [Calm] : calm [de-identified] : A&Ox3, NAD [de-identified] : HTN, HLD [de-identified] : Diabetic Charcot right foot deformity status post right Achilles tendon lengthening, right posterior tibial tendon lengthening, right peroneus brevis tendon detachment and reattachment with exostectomy of the right fifth metatarsal base and plantar cuboid, and right fifth digit proximal phalanx base resection [de-identified] : Right plantar midfoot ulcer healed. right lateral foot surgical site with central dehiscence and open wound down to skin, subcutaneous tissue, fat, almost healed [de-identified] : Diabetic neuropathy  [de-identified] : *DPM shaved surrounding callus  [FreeTextEntry1] : Lateral foot [FreeTextEntry2] : 0.5 [FreeTextEntry3] : 0.3 [FreeTextEntry4] : 0.2 [de-identified] : Serosanguineous   [de-identified] : Mild [de-identified] : Patient expressed comport post application, Circ/Neuromuscular function WNL [de-identified] : Nai (Dry) [de-identified] :  Mechanically cleansed with NS 0.9%, Sterile Gauze Kerlix Coban (Regular)   **Betadine to Janet-wound [TWNoteComboBox1] : Right [TWNoteComboBox4] : Small [TWNoteComboBox5] : No [TWNoteComboBox6] : Diabetic [de-identified] : No [de-identified] : Macerated [de-identified] : None [de-identified] : None [de-identified] : 100% [de-identified] : No [de-identified] : Multilayer other compression wrap [de-identified] : 3x Weekly [de-identified] : Primary Dressing

## 2024-09-05 NOTE — HISTORY OF PRESENT ILLNESS
[FreeTextEntry1] : Patient is 58 year old male presents for follow-up status post right foot midfoot plantar exostectomy. Patient relates the right foot dressing was noted with bloody drainage and has been started on Eliquis per cardiologist for new onset Afib. Patient relates that he has been staying off of his right foot and has been utilizing a knee scooter.  9/5/2024 patient seen for follow-up status post right midfoot plantar exostectomy. Pt being seen for right lateral midfoot wound down to skin, subcutaneous tissue, fat from dehiscence, s/p delayed primary closure, almost healed.

## 2024-09-05 NOTE — ASSESSMENT
[Verbal] : Verbal [Demo] : Demo [Patient] : Patient [Good - alert, interested, motivated] : Good - alert, interested, motivated [Verbalizes knowledge/Understanding] : Verbalizes knowledge/understanding [Dressing changes] : dressing changes [Foot Care] : foot care [Skin Care] : skin care [Pressure relief] : pressure relief [Signs and symptoms of infection] : sign and symptoms of infection [Nutrition] : nutrition [How and When to Call] : how and when to call [Off-loading] : off-loading [Compression Therapy] : compression therapy [Patient responsibility to plan of care] : patient responsibility to plan of care [Glycemic Control] : glycemic control [] : Yes [Stable] : stable [Home] : Home [Other: ____] : [unfilled] [Not Applicable - Long Term Care/Home Health Agency] : Long Term Care/Home Health Agency: Not Applicable [FreeTextEntry2] : Infection prevention Localized wound care Promote optimal skin integrity  Maintain acceptable level of pain with use of pharmacological and nonpharmacological interventions Offloading / Pressure relief  Daily foot care / monitoring  [FreeTextEntry4] : Follow up Monday for an assessment

## 2024-09-05 NOTE — REVIEW OF SYSTEMS
[Fever] : no fever [Chills] : no chills [Eye Pain] : no eye pain [Red Eyes] : eyes not red [Earache] : no earache [Nosebleeds] : no nosebleeds [Chest Pain] : no chest pain [Shortness Of Breath] : no shortness of breath [Cough] : no cough [Abdominal Pain] : no abdominal pain [Vomiting] : no vomiting [Diarrhea] : no diarrhea [Joint Swelling] : no joint swelling [Limb Pain] : no limb pain [Skin Lesions] : skin lesion [Skin Wound] : no skin wound [Anxiety] : no anxiety [Easy Bleeding] : no tendency for easy bleeding [FreeTextEntry5] : HTN,HLD [FreeTextEntry7] : 40.9 BMI , morbid obesity  [FreeTextEntry8] : Kidney Transplant  [FreeTextEntry9] : Associated Right Diabetic Charcot foot status post right Achilles tendon lengthening, right posterior tibial tendon lengthening, right peroneus brevis tendon detachment and reattachment with exostectomy of the right fifth metatarsal base and plantar cuboid, and right fifth digit proximal phalanx base resection [de-identified] : Right plantar midfoot ulcer down to fat ,right lateral surgical site with sutures intact , no dehiscence     erbdvv gkl              ] [de-identified] : IDDM with neuropathy  [de-identified] : MARILUZ

## 2024-09-08 DIAGNOSIS — Z83.3 FAMILY HISTORY OF DIABETES MELLITUS: ICD-10-CM

## 2024-09-08 DIAGNOSIS — Z85.828 PERSONAL HISTORY OF OTHER MALIGNANT NEOPLASM OF SKIN: ICD-10-CM

## 2024-09-08 DIAGNOSIS — E11.610 TYPE 2 DIABETES MELLITUS WITH DIABETIC NEUROPATHIC ARTHROPATHY: ICD-10-CM

## 2024-09-08 DIAGNOSIS — Z80.8 FAMILY HISTORY OF MALIGNANT NEOPLASM OF OTHER ORGANS OR SYSTEMS: ICD-10-CM

## 2024-09-08 DIAGNOSIS — T81.89XD OTHER COMPLICATIONS OF PROCEDURES, NOT ELSEWHERE CLASSIFIED, SUBSEQUENT ENCOUNTER: ICD-10-CM

## 2024-09-08 DIAGNOSIS — Z89.421 ACQUIRED ABSENCE OF OTHER RIGHT TOE(S): ICD-10-CM

## 2024-09-08 DIAGNOSIS — G47.33 OBSTRUCTIVE SLEEP APNEA (ADULT) (PEDIATRIC): ICD-10-CM

## 2024-09-08 DIAGNOSIS — E11.621 TYPE 2 DIABETES MELLITUS WITH FOOT ULCER: ICD-10-CM
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
## 2024-09-08 DIAGNOSIS — E78.5 HYPERLIPIDEMIA, UNSPECIFIED: ICD-10-CM

## 2024-09-08 DIAGNOSIS — Z87.81 PERSONAL HISTORY OF (HEALED) TRAUMATIC FRACTURE: ICD-10-CM

## 2024-09-08 DIAGNOSIS — Z79.899 OTHER LONG TERM (CURRENT) DRUG THERAPY: ICD-10-CM

## 2024-09-08 DIAGNOSIS — Z86.718 PERSONAL HISTORY OF OTHER VENOUS THROMBOSIS AND EMBOLISM: ICD-10-CM

## 2024-09-08 DIAGNOSIS — Z79.01 LONG TERM (CURRENT) USE OF ANTICOAGULANTS: ICD-10-CM

## 2024-09-08 DIAGNOSIS — Z80.3 FAMILY HISTORY OF MALIGNANT NEOPLASM OF BREAST: ICD-10-CM

## 2024-09-08 DIAGNOSIS — Z89.422 ACQUIRED ABSENCE OF OTHER LEFT TOE(S): ICD-10-CM
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
## 2024-09-08 DIAGNOSIS — Z86.16 PERSONAL HISTORY OF COVID-19: ICD-10-CM

## 2024-09-08 DIAGNOSIS — L84 CORNS AND CALLOSITIES: ICD-10-CM

## 2024-09-08 DIAGNOSIS — Z94.0 KIDNEY TRANSPLANT STATUS: ICD-10-CM

## 2024-09-08 DIAGNOSIS — Z86.73 PERSONAL HISTORY OF TRANSIENT ISCHEMIC ATTACK (TIA), AND CEREBRAL INFARCTION WITHOUT RESIDUAL DEFICITS: ICD-10-CM

## 2024-09-08 DIAGNOSIS — Z79.4 LONG TERM (CURRENT) USE OF INSULIN: ICD-10-CM

## 2024-09-09 ENCOUNTER — OUTPATIENT (OUTPATIENT)
Dept: OUTPATIENT SERVICES | Facility: HOSPITAL | Age: 59
LOS: 1 days | Discharge: HOME | End: 2024-09-09
Payer: MEDICARE

## 2024-09-09 ENCOUNTER — APPOINTMENT (OUTPATIENT)
Dept: WOUND CARE | Facility: HOSPITAL | Age: 59
End: 2024-09-09
Payer: MEDICARE

## 2024-09-09 VITALS
DIASTOLIC BLOOD PRESSURE: 77 MMHG | HEIGHT: 73 IN | OXYGEN SATURATION: 92 % | SYSTOLIC BLOOD PRESSURE: 120 MMHG | RESPIRATION RATE: 18 BRPM | BODY MASS INDEX: 41.75 KG/M2 | HEART RATE: 81 BPM | WEIGHT: 315 LBS | TEMPERATURE: 98.4 F

## 2024-09-09 DIAGNOSIS — T81.89XD OTHER COMPLICATIONS OF PROCEDURES, NOT ELSEWHERE CLASSIFIED, SUBSEQUENT ENCOUNTER: ICD-10-CM

## 2024-09-09 DIAGNOSIS — L97.412 NON-PRESSURE CHRONIC ULCER OF RIGHT HEEL AND MIDFOOT WITH FAT LAYER EXPOSED: ICD-10-CM

## 2024-09-09 DIAGNOSIS — Z89.421 ACQUIRED ABSENCE OF OTHER RIGHT TOE(S): Chronic | ICD-10-CM

## 2024-09-09 PROCEDURE — 99213 OFFICE O/P EST LOW 20 MIN: CPT

## 2024-09-09 PROCEDURE — 29581 APPL MULTLAYER CMPRN SYS LEG: CPT | Mod: RT

## 2024-09-09 NOTE — HISTORY OF PRESENT ILLNESS
[FreeTextEntry1] : DFU 2 lateral base of the right 5th metatarsal , clean , small and granular , no soi

## 2024-09-09 NOTE — REVIEW OF SYSTEMS
[Fever] : no fever [Chills] : no chills [Eye Pain] : no eye pain [Red Eyes] : eyes not red [Earache] : no earache [Nosebleeds] : no nosebleeds [Chest Pain] : no chest pain [Shortness Of Breath] : no shortness of breath [Cough] : no cough [Abdominal Pain] : no abdominal pain [Vomiting] : no vomiting [Diarrhea] : no diarrhea [Joint Swelling] : no joint swelling [Limb Pain] : no limb pain [Skin Lesions] : skin lesion [Skin Wound] : no skin wound [Anxiety] : no anxiety [Easy Bleeding] : no tendency for easy bleeding [FreeTextEntry5] : HTN,HLD [FreeTextEntry7] : 40.9 BMI , morbid obesity  [FreeTextEntry8] : Kidney Transplant  [FreeTextEntry9] : Associated Right Diabetic Charcot foot status post right Achilles tendon lengthening, right posterior tibial tendon lengthening, right peroneus brevis tendon detachment and reattachment with exostectomy of the right fifth metatarsal base and plantar cuboid, and right fifth digit proximal phalanx base resection [de-identified] : Right lateral , base 5th metatarsal , clean , granular     erbdvv gkl              ] [de-identified] : IDDM with neuropathy  [de-identified] : MARILUZ

## 2024-09-09 NOTE — PHYSICAL EXAM
[4 x 4] : 4 x 4  [Abdominal Pad] : Abdominal Pad [0] : left 0 [1+] : left 1+ [Varicose Veins Of Lower Extremities] : not present [] : not present [Purpura] : no purpura  [Petechiae] : no petechiae [Skin Ulcer] : ulcer [Skin Induration] : no induration [Alert] : alert [Oriented to Person] : oriented to person [Oriented to Place] : oriented to place [Oriented to Time] : oriented to time [Calm] : calm [de-identified] : A&Ox3, NAD [de-identified] : HTN, HLD [de-identified] : Diabetic Charcot right foot deformity status post right Achilles tendon lengthening, right posterior tibial tendon lengthening, right peroneus brevis tendon detachment and reattachment with exostectomy of the right fifth metatarsal base and plantar cuboid, and right fifth digit proximal phalanx base resection [de-identified] : Right plantar midfoot ulcer healed. right lateral foot surgical site with central dehiscence and open wound down to skin, subcutaneous tissue, fat, no periwound erythema, sutures intact [de-identified] : Diabetic neuropathy  [FreeTextEntry1] : Right lateral foot  [FreeTextEntry2] : 0.4 [FreeTextEntry3] : 1.3 [FreeTextEntry4] : 0.2 [de-identified] : Serous/sanguinous [de-identified] : callus  [de-identified] : Expressed comfort post Coban application. No neurovascular deficits noted. [de-identified] : Nai, betadine to phil wound  [de-identified] : Mechanically cleansed with sterile gauze and normal saline. Kerlix  callus shaved by KYLE  [TWNoteComboBox4] : Small [de-identified] : other [de-identified] : None [de-identified] : None [de-identified] : 100% [de-identified] : No [de-identified] : Multilayer Dynaflex Compression [de-identified] : 3x Weekly [de-identified] : Primary Dressing

## 2024-09-09 NOTE — PLAN
[FreeTextEntry1] : continue off loading and wound care PTR 1week  Spent 20 minutes for patient care and medical decision making.

## 2024-09-09 NOTE — ASSESSMENT
[Verbal] : Verbal [Written] : Written [Demo] : Demo [Patient] : Patient [Spouse] : Spouse [Good - alert, interested, motivated] : Good - alert, interested, motivated [Verbalizes knowledge/Understanding] : Verbalizes knowledge/understanding [Dressing changes] : dressing changes [Foot Care] : foot care [Skin Care] : skin care [Signs and symptoms of infection] : sign and symptoms of infection [Nutrition] : nutrition [How and When to Call] : how and when to call [Pain Management] : pain management [Off-loading] : off-loading [Compression Therapy] : compression therapy [Patient responsibility to plan of care] : patient responsibility to plan of care [Glycemic Control] : glycemic control [Stable] : stable [Home] : Home [Other: ____] : [unfilled] [Not Applicable - Long Term Care/Home Health Agency] : Long Term Care/Home Health Agency: Not Applicable [] : No [FreeTextEntry2] : Infection prevention Localized wound care  Goal remaining pain free regarding wounds compression therapy   Promote optimal skin care and nutrition. Offloading  [FreeTextEntry3] : Wound the same in status  [FreeTextEntry4] : dressing changes at [facility] Assessment in 1 week

## 2024-09-10 ENCOUNTER — EMERGENCY (EMERGENCY)
Facility: HOSPITAL | Age: 59
LOS: 1 days | Discharge: HOME | End: 2024-09-10
Attending: EMERGENCY MEDICINE
Payer: MEDICARE

## 2024-09-10 VITALS
WEIGHT: 315 LBS | HEART RATE: 86 BPM | DIASTOLIC BLOOD PRESSURE: 73 MMHG | RESPIRATION RATE: 18 BRPM | TEMPERATURE: 98 F | SYSTOLIC BLOOD PRESSURE: 184 MMHG | OXYGEN SATURATION: 94 % | HEIGHT: 73 IN

## 2024-09-10 LAB
ALBUMIN SERPL ELPH-MCNC: 4 G/DL — SIGNIFICANT CHANGE UP (ref 3.3–5)
ALP SERPL-CCNC: 115 U/L — SIGNIFICANT CHANGE UP (ref 40–120)
ALT FLD-CCNC: 13 U/L — SIGNIFICANT CHANGE UP (ref 10–45)
ANION GAP SERPL CALC-SCNC: 14 MMOL/L — SIGNIFICANT CHANGE UP (ref 5–17)
APPEARANCE UR: CLEAR — SIGNIFICANT CHANGE UP
AST SERPL-CCNC: 15 U/L — SIGNIFICANT CHANGE UP (ref 10–40)
BACTERIA # UR AUTO: NEGATIVE /HPF — SIGNIFICANT CHANGE UP
BASOPHILS # BLD AUTO: 0.04 K/UL — SIGNIFICANT CHANGE UP (ref 0–0.2)
BASOPHILS NFR BLD AUTO: 0.4 % — SIGNIFICANT CHANGE UP (ref 0–2)
BILIRUB SERPL-MCNC: 0.4 MG/DL — SIGNIFICANT CHANGE UP (ref 0.2–1.2)
BILIRUB UR-MCNC: NEGATIVE — SIGNIFICANT CHANGE UP
BUN SERPL-MCNC: 26 MG/DL — HIGH (ref 7–23)
CALCIUM SERPL-MCNC: 9.7 MG/DL — SIGNIFICANT CHANGE UP (ref 8.4–10.5)
CAST: 0 /LPF — SIGNIFICANT CHANGE UP (ref 0–4)
CHLORIDE SERPL-SCNC: 102 MMOL/L — SIGNIFICANT CHANGE UP (ref 96–108)
CO2 SERPL-SCNC: 24 MMOL/L — SIGNIFICANT CHANGE UP (ref 22–31)
COLOR SPEC: YELLOW — SIGNIFICANT CHANGE UP
CREAT SERPL-MCNC: 1.26 MG/DL — SIGNIFICANT CHANGE UP (ref 0.5–1.3)
DIFF PNL FLD: ABNORMAL
EGFR: 67 ML/MIN/1.73M2 — SIGNIFICANT CHANGE UP
EOSINOPHIL # BLD AUTO: 0.26 K/UL — SIGNIFICANT CHANGE UP (ref 0–0.5)
EOSINOPHIL NFR BLD AUTO: 2.7 % — SIGNIFICANT CHANGE UP (ref 0–6)
GLUCOSE SERPL-MCNC: 165 MG/DL — HIGH (ref 70–99)
GLUCOSE UR QL: NEGATIVE MG/DL — SIGNIFICANT CHANGE UP
HCT VFR BLD CALC: 43.6 % — SIGNIFICANT CHANGE UP (ref 39–50)
HGB BLD-MCNC: 14.3 G/DL — SIGNIFICANT CHANGE UP (ref 13–17)
IMM GRANULOCYTES NFR BLD AUTO: 0.4 % — SIGNIFICANT CHANGE UP (ref 0–0.9)
KETONES UR-MCNC: ABNORMAL MG/DL
LEUKOCYTE ESTERASE UR-ACNC: NEGATIVE — SIGNIFICANT CHANGE UP
LYMPHOCYTES # BLD AUTO: 0.96 K/UL — LOW (ref 1–3.3)
LYMPHOCYTES # BLD AUTO: 9.9 % — LOW (ref 13–44)
MCHC RBC-ENTMCNC: 29.1 PG — SIGNIFICANT CHANGE UP (ref 27–34)
MCHC RBC-ENTMCNC: 32.8 GM/DL — SIGNIFICANT CHANGE UP (ref 32–36)
MCV RBC AUTO: 88.6 FL — SIGNIFICANT CHANGE UP (ref 80–100)
MONOCYTES # BLD AUTO: 0.65 K/UL — SIGNIFICANT CHANGE UP (ref 0–0.9)
MONOCYTES NFR BLD AUTO: 6.7 % — SIGNIFICANT CHANGE UP (ref 2–14)
NEUTROPHILS # BLD AUTO: 7.72 K/UL — HIGH (ref 1.8–7.4)
NEUTROPHILS NFR BLD AUTO: 79.9 % — HIGH (ref 43–77)
NITRITE UR-MCNC: NEGATIVE — SIGNIFICANT CHANGE UP
NRBC # BLD: 0 /100 WBCS — SIGNIFICANT CHANGE UP (ref 0–0)
PH UR: 6 — SIGNIFICANT CHANGE UP (ref 5–8)
PLATELET # BLD AUTO: 210 K/UL — SIGNIFICANT CHANGE UP (ref 150–400)
POTASSIUM SERPL-MCNC: 4.4 MMOL/L — SIGNIFICANT CHANGE UP (ref 3.5–5.3)
POTASSIUM SERPL-SCNC: 4.4 MMOL/L — SIGNIFICANT CHANGE UP (ref 3.5–5.3)
PROT SERPL-MCNC: 7.6 G/DL — SIGNIFICANT CHANGE UP (ref 6–8.3)
PROT UR-MCNC: 30 MG/DL
RBC # BLD: 4.92 M/UL — SIGNIFICANT CHANGE UP (ref 4.2–5.8)
RBC # FLD: 14.3 % — SIGNIFICANT CHANGE UP (ref 10.3–14.5)
RBC CASTS # UR COMP ASSIST: 9 /HPF — HIGH (ref 0–4)
SODIUM SERPL-SCNC: 140 MMOL/L — SIGNIFICANT CHANGE UP (ref 135–145)
SP GR SPEC: 1.02 — SIGNIFICANT CHANGE UP (ref 1–1.03)
SQUAMOUS # UR AUTO: 0 /HPF — SIGNIFICANT CHANGE UP (ref 0–5)
TROPONIN T, HIGH SENSITIVITY RESULT: 7 NG/L — SIGNIFICANT CHANGE UP (ref 0–51)
UROBILINOGEN FLD QL: 1 MG/DL — SIGNIFICANT CHANGE UP (ref 0.2–1)
WBC # BLD: 9.67 K/UL — SIGNIFICANT CHANGE UP (ref 3.8–10.5)
WBC # FLD AUTO: 9.67 K/UL — SIGNIFICANT CHANGE UP (ref 3.8–10.5)
WBC UR QL: 0 /HPF — SIGNIFICANT CHANGE UP (ref 0–5)

## 2024-09-10 PROCEDURE — 71045 X-RAY EXAM CHEST 1 VIEW: CPT | Mod: 26

## 2024-09-10 PROCEDURE — 74176 CT ABD & PELVIS W/O CONTRAST: CPT | Mod: 26,MC

## 2024-09-10 PROCEDURE — 99284 EMERGENCY DEPT VISIT MOD MDM: CPT | Mod: FS

## 2024-09-10 RX ORDER — SODIUM CHLORIDE 9 MG/ML
500 INJECTION INTRAMUSCULAR; INTRAVENOUS; SUBCUTANEOUS ONCE
Refills: 0 | Status: COMPLETED | OUTPATIENT
Start: 2024-09-10 | End: 2024-09-10

## 2024-09-10 RX ORDER — CYCLOBENZAPRINE HCL 10 MG
10 TABLET ORAL ONCE
Refills: 0 | Status: COMPLETED | OUTPATIENT
Start: 2024-09-10 | End: 2024-09-10

## 2024-09-10 RX ORDER — ACETAMINOPHEN 325 MG/1
1000 TABLET ORAL ONCE
Refills: 0 | Status: COMPLETED | OUTPATIENT
Start: 2024-09-10 | End: 2024-09-10

## 2024-09-10 RX ORDER — LIDOCAINE/BENZALKONIUM/ALCOHOL
1 SOLUTION, NON-ORAL TOPICAL ONCE
Refills: 0 | Status: COMPLETED | OUTPATIENT
Start: 2024-09-10 | End: 2024-09-10

## 2024-09-10 RX ADMIN — SODIUM CHLORIDE 500 MILLILITER(S): 9 INJECTION INTRAMUSCULAR; INTRAVENOUS; SUBCUTANEOUS at 21:50

## 2024-09-10 RX ADMIN — ACETAMINOPHEN 400 MILLIGRAM(S): 325 TABLET ORAL at 20:47

## 2024-09-10 RX ADMIN — Medication 1 PATCH: at 21:01

## 2024-09-10 RX ADMIN — Medication 10 MILLIGRAM(S): at 21:01

## 2024-09-10 NOTE — ED ADULT NURSE NOTE - OBJECTIVE STATEMENT
58YM A&OX4 PMHX of T2DM on insulin pump, HTN, HLD, CVA 2015, prior DVT s/p eliquis, recurrent R pleural effusion with VATs, renal transplant 2013, afib presents to the ED c/o R sided back pain for 4 days. pt denies urinary symptoms, CP, SOB, f/c/n/v/d,

## 2024-09-10 NOTE — ED PROVIDER NOTE - ATTENDING APP SHARED VISIT CONTRIBUTION OF CARE
I have reviewed this note, the presenting symptoms, and the Chief Complaint and the History of Present Illness as documented, with the other care provider(s) including resident, ACP, and nurses on the patient care team. I have also reviewed this patient's past medical/surgical history and social/family history as reviewed and listed in this electronic medical record.  I agree with the resident/ACP documentation except where noted otherwise in my personal documentation.  See MDM.  --Yaron Edmond MD, Attending Physician

## 2024-09-10 NOTE — ED PROVIDER NOTE - NS CPE EDP MUSC THORACIC LOC
right paraspinal/tenderness right paraspinal about upper lumbar/lower thoracic chest wall.  +jump sign.  +hypertonicity of parathoracic musculature/tenderness

## 2024-09-10 NOTE — ED PROVIDER NOTE - OBJECTIVE STATEMENT
59 y/o male PMHx type 1 diabetes mellitus on insulin pump, HTN, HLD, CVA (2015), prior DVT (s/p Eliquis), recurrent right pleural effusion with VATS, renal transplant (2013) now presenting to the ED for gradual onset worsening right sided upper back pain x4 days. Patient reported he initially felt sharp discomfort that acutely worsened at 1pm today. Patient reports its sharp shooting and described to be a spasm as well. Has not taken medication prior to ED arrival. Denied hematuria, urinary freq, fever, dysuria, nausea, vomiting, CP, SOB 59 y/o male PMHx type 1 diabetes mellitus on insulin pump, HTN, HLD, CVA (2015), prior DVT (s/p Eliquis), recurrent right pleural effusion with VATS, renal transplant (2013), Afib now presenting to the ED for gradual onset worsening right sided mid back pain x4 days. Patient reported he initially felt mild but sharp discomfort that has gradually worsened. Patient reports its sharp shooting and described to be a spasm as well.  Nonradiating, minimally present at rest, significant with range of motion or bending/extending back/twisting R>L.  No chest pain, sob.  No rash.  No trauma.  No recent heavy lifting. Has not taken medication prior to ED arrival. Denied hematuria, urinary freq, fever, dysuria, nausea, vomiting, CP, SOB

## 2024-09-10 NOTE — ED PROVIDER NOTE - PATIENT PORTAL LINK FT
You can access the FollowMyHealth Patient Portal offered by [facility] by registering at the following website: [email or web address]. By joining [facility]’s FollowMyHealth portal, you will also be able to view your health information using other applications (apps) compatible with our system.

## 2024-09-10 NOTE — ED PROVIDER NOTE - NSFOLLOWUPINSTRUCTIONS_ED_ALL_ED_FT
You were seen in the Emergency Department for back/flank pain.    Follow up with your primary care provider within 3-5 days.    You may take 975 mg Tylenol (acetaminophen) every six hours as needed for pain.    You are being sent a prescription for cyclobenzaprine. Please see medication labels for instructions and warnings. Do not operate heavy machinery or motor vehicles while this medication as it may make you drowsy.    If you have fever, chills, nausea, vomiting, new or worsening pain, or if you have any new symptoms return to the Emergency Department.

## 2024-09-10 NOTE — ED PROVIDER NOTE - CLINICAL SUMMARY MEDICAL DECISION MAKING FREE TEXT BOX
R sided parathoracic/paralumbar tenderness to palpation and spasm, atraumatic, worse with range of motion, no pleuritic component, radiation, or sob associated.  Pt is nontoxic, well appearing, vss, exam otherwise benign.  No abd tender to palpation or distension.  Seems like MSK strain/spasm/trigger point though given complex medical hx he will needs labs, EKG, trop, CT AP, UA/Cx, pain Rx and lidoderm patch.  Overall doubt RP pathology (pyelo, stone, aortic catastrophe) and intrabd process like biliary colic or pancreattitis are unlikely as well.  Very unlikely acute coronary syndrome, PTx,, PNA.  Very unlikely PE.  Will reassess after workup; if symptoms are improving with above treatment and imaging/labs are non-actionable should be able to d/c c spine center f/u.  Defer transplant surgery cs for now, will obtain if labs/imaging are abnormal.  Reassess.  --Yaron Edmond MD, Attending Physician R sided parathoracic/paralumbar tenderness to palpation and spasm, atraumatic, worse with range of motion, no pleuritic component, radiation, or sob associated.  Pt is nontoxic, well appearing, vss, exam otherwise benign.  No abd tender to palpation or distension.  Seems like MSK strain/spasm/trigger point though given complex medical hx he will needs labs, EKG, trop, CT AP, UA/Cx, pain Rx and lidoderm patch.  Overall doubt RP pathology (pyelo, stone, aortic catastrophe) and intrabd process like biliary colic or pancreattitis are unlikely as well.  Very unlikely acute coronary syndrome, PTx,, PNA.  Very unlikely PE.  Pt has an insulin pump but can remove it and reattach it for CT.  Should not need to have supplemental bolus/coverage.  Will reassess after workup; if symptoms are improving with above treatment and imaging/labs are non-actionable should be able to d/c c spine center f/u.  Defer transplant surgery cs for now, will obtain if labs/imaging are abnormal.  Reassess.  --Yaron Edmond MD, Attending Physician

## 2024-09-11 ENCOUNTER — APPOINTMENT (OUTPATIENT)
Dept: WOUND CARE | Facility: HOSPITAL | Age: 59
End: 2024-09-11

## 2024-09-11 VITALS
OXYGEN SATURATION: 96 % | RESPIRATION RATE: 18 BRPM | DIASTOLIC BLOOD PRESSURE: 85 MMHG | HEART RATE: 83 BPM | TEMPERATURE: 99 F | SYSTOLIC BLOOD PRESSURE: 170 MMHG

## 2024-09-11 DIAGNOSIS — Z89.422 ACQUIRED ABSENCE OF OTHER LEFT TOE(S): ICD-10-CM

## 2024-09-11 DIAGNOSIS — E11.621 TYPE 2 DIABETES MELLITUS WITH FOOT ULCER: ICD-10-CM

## 2024-09-11 DIAGNOSIS — Z89.421 ACQUIRED ABSENCE OF OTHER RIGHT TOE(S): ICD-10-CM

## 2024-09-11 DIAGNOSIS — Z83.3 FAMILY HISTORY OF DIABETES MELLITUS: ICD-10-CM

## 2024-09-11 DIAGNOSIS — Z85.828 PERSONAL HISTORY OF OTHER MALIGNANT NEOPLASM OF SKIN: ICD-10-CM
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
## 2024-09-11 DIAGNOSIS — E78.5 HYPERLIPIDEMIA, UNSPECIFIED: ICD-10-CM

## 2024-09-11 DIAGNOSIS — Z94.0 KIDNEY TRANSPLANT STATUS: ICD-10-CM

## 2024-09-11 DIAGNOSIS — T81.89XD OTHER COMPLICATIONS OF PROCEDURES, NOT ELSEWHERE CLASSIFIED, SUBSEQUENT ENCOUNTER: ICD-10-CM

## 2024-09-11 DIAGNOSIS — L84 CORNS AND CALLOSITIES: ICD-10-CM

## 2024-09-11 DIAGNOSIS — L97.412 NON-PRESSURE CHRONIC ULCER OF RIGHT HEEL AND MIDFOOT WITH FAT LAYER EXPOSED: ICD-10-CM

## 2024-09-11 DIAGNOSIS — Z80.8 FAMILY HISTORY OF MALIGNANT NEOPLASM OF OTHER ORGANS OR SYSTEMS: ICD-10-CM

## 2024-09-11 DIAGNOSIS — Z87.81 PERSONAL HISTORY OF (HEALED) TRAUMATIC FRACTURE: ICD-10-CM

## 2024-09-11 DIAGNOSIS — Z79.01 LONG TERM (CURRENT) USE OF ANTICOAGULANTS: ICD-10-CM

## 2024-09-11 DIAGNOSIS — Z79.4 LONG TERM (CURRENT) USE OF INSULIN: ICD-10-CM
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
## 2024-09-11 DIAGNOSIS — Z86.73 PERSONAL HISTORY OF TRANSIENT ISCHEMIC ATTACK (TIA), AND CEREBRAL INFARCTION WITHOUT RESIDUAL DEFICITS: ICD-10-CM

## 2024-09-11 DIAGNOSIS — Z80.3 FAMILY HISTORY OF MALIGNANT NEOPLASM OF BREAST: ICD-10-CM

## 2024-09-11 DIAGNOSIS — Z79.899 OTHER LONG TERM (CURRENT) DRUG THERAPY: ICD-10-CM

## 2024-09-11 DIAGNOSIS — G47.33 OBSTRUCTIVE SLEEP APNEA (ADULT) (PEDIATRIC): ICD-10-CM

## 2024-09-11 DIAGNOSIS — E11.610 TYPE 2 DIABETES MELLITUS WITH DIABETIC NEUROPATHIC ARTHROPATHY: ICD-10-CM

## 2024-09-11 DIAGNOSIS — Z86.16 PERSONAL HISTORY OF COVID-19: ICD-10-CM

## 2024-09-11 LAB — TACROLIMUS SERPL-MCNC: 7 NG/ML — SIGNIFICANT CHANGE UP

## 2024-09-11 PROCEDURE — 81001 URINALYSIS AUTO W/SCOPE: CPT

## 2024-09-11 PROCEDURE — 84484 ASSAY OF TROPONIN QUANT: CPT

## 2024-09-11 PROCEDURE — 99285 EMERGENCY DEPT VISIT HI MDM: CPT | Mod: 25

## 2024-09-11 PROCEDURE — 85025 COMPLETE CBC W/AUTO DIFF WBC: CPT

## 2024-09-11 PROCEDURE — 71045 X-RAY EXAM CHEST 1 VIEW: CPT

## 2024-09-11 PROCEDURE — 80053 COMPREHEN METABOLIC PANEL: CPT

## 2024-09-11 PROCEDURE — 80197 ASSAY OF TACROLIMUS: CPT

## 2024-09-11 PROCEDURE — 93005 ELECTROCARDIOGRAM TRACING: CPT

## 2024-09-11 PROCEDURE — 82962 GLUCOSE BLOOD TEST: CPT

## 2024-09-11 PROCEDURE — 74176 CT ABD & PELVIS W/O CONTRAST: CPT | Mod: MC

## 2024-09-11 PROCEDURE — 87086 URINE CULTURE/COLONY COUNT: CPT

## 2024-09-11 PROCEDURE — 96374 THER/PROPH/DIAG INJ IV PUSH: CPT

## 2024-09-11 RX ORDER — CYCLOBENZAPRINE HCL 10 MG
1 TABLET ORAL
Qty: 9 | Refills: 0
Start: 2024-09-11 | End: 2024-09-13

## 2024-09-12 LAB
CULTURE RESULTS: SIGNIFICANT CHANGE UP
SPECIMEN SOURCE: SIGNIFICANT CHANGE UP

## 2024-09-13 ENCOUNTER — OUTPATIENT (OUTPATIENT)
Dept: OUTPATIENT SERVICES | Facility: HOSPITAL | Age: 59
LOS: 1 days | Discharge: HOME | End: 2024-09-13
Payer: MEDICARE

## 2024-09-13 ENCOUNTER — APPOINTMENT (OUTPATIENT)
Dept: WOUND CARE | Facility: HOSPITAL | Age: 59
End: 2024-09-13

## 2024-09-13 VITALS
TEMPERATURE: 98.1 F | OXYGEN SATURATION: 95 % | HEART RATE: 68 BPM | WEIGHT: 315 LBS | HEIGHT: 73 IN | DIASTOLIC BLOOD PRESSURE: 68 MMHG | SYSTOLIC BLOOD PRESSURE: 109 MMHG | RESPIRATION RATE: 18 BRPM | BODY MASS INDEX: 41.75 KG/M2
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
## 2024-09-13 DIAGNOSIS — T81.89XD OTHER COMPLICATIONS OF PROCEDURES, NOT ELSEWHERE CLASSIFIED, SUBSEQUENT ENCOUNTER: ICD-10-CM

## 2024-09-13 DIAGNOSIS — N18.6 END STAGE RENAL DISEASE: Chronic | ICD-10-CM

## 2024-09-13 DIAGNOSIS — Z89.421 ACQUIRED ABSENCE OF OTHER RIGHT TOE(S): Chronic | ICD-10-CM

## 2024-09-13 DIAGNOSIS — Z94.0 KIDNEY TRANSPLANT STATUS: Chronic | ICD-10-CM

## 2024-09-13 DIAGNOSIS — E11.621 TYPE 2 DIABETES MELLITUS WITH FOOT ULCER: ICD-10-CM

## 2024-09-13 DIAGNOSIS — L97.412 NON-PRESSURE CHRONIC ULCER OF RIGHT HEEL AND MIDFOOT WITH FAT LAYER EXPOSED: ICD-10-CM

## 2024-09-13 DIAGNOSIS — Z98.890 OTHER SPECIFIED POSTPROCEDURAL STATES: Chronic | ICD-10-CM

## 2024-09-13 PROCEDURE — ZZZZZ: CPT

## 2024-09-13 PROCEDURE — 29581 APPL MULTLAYER CMPRN SYS LEG: CPT | Mod: RT

## 2024-09-16 ENCOUNTER — OUTPATIENT (OUTPATIENT)
Dept: OUTPATIENT SERVICES | Facility: HOSPITAL | Age: 59
LOS: 1 days | Discharge: HOME | End: 2024-09-16
Payer: MEDICARE

## 2024-09-16 ENCOUNTER — APPOINTMENT (OUTPATIENT)
Dept: WOUND CARE | Facility: HOSPITAL | Age: 59
End: 2024-09-16
Payer: MEDICARE

## 2024-09-16 VITALS
DIASTOLIC BLOOD PRESSURE: 65 MMHG | HEIGHT: 73 IN | TEMPERATURE: 98.1 F | OXYGEN SATURATION: 95 % | BODY MASS INDEX: 41.75 KG/M2 | RESPIRATION RATE: 18 BRPM | SYSTOLIC BLOOD PRESSURE: 108 MMHG | WEIGHT: 315 LBS | HEART RATE: 70 BPM

## 2024-09-16 DIAGNOSIS — T81.89XD OTHER COMPLICATIONS OF PROCEDURES, NOT ELSEWHERE CLASSIFIED, SUBSEQUENT ENCOUNTER: ICD-10-CM

## 2024-09-16 DIAGNOSIS — Z89.421 ACQUIRED ABSENCE OF OTHER RIGHT TOE(S): Chronic | ICD-10-CM

## 2024-09-16 DIAGNOSIS — N18.6 END STAGE RENAL DISEASE: Chronic | ICD-10-CM

## 2024-09-16 PROCEDURE — 29581 APPL MULTLAYER CMPRN SYS LEG: CPT | Mod: RT

## 2024-09-16 PROCEDURE — 99213 OFFICE O/P EST LOW 20 MIN: CPT

## 2024-09-16 NOTE — PHYSICAL EXAM
[4 x 4] : 4 x 4  [Abdominal Pad] : Abdominal Pad [0] : left 0 [1+] : left 1+ [Varicose Veins Of Lower Extremities] : not present [] : not present [Purpura] : no purpura  [Petechiae] : no petechiae [Skin Ulcer] : ulcer [Skin Induration] : no induration [Alert] : alert [Oriented to Person] : oriented to person [Oriented to Place] : oriented to place [Oriented to Time] : oriented to time [Calm] : calm [de-identified] : A&Ox3, NAD [de-identified] : HTN, HLD [de-identified] : Diabetic Charcot right foot deformity status post right Achilles tendon lengthening, right posterior tibial tendon lengthening, right peroneus brevis tendon detachment and reattachment with exostectomy of the right fifth metatarsal base and plantar cuboid, and right fifth digit proximal phalanx base resection [de-identified] : Right plantar midfoot ulcer healed. right lateral foot surgical site with central dehiscence and open wound down to skin, subcutaneous tissue, fat, no periwound erythema, sutures intact [de-identified] : Diabetic neuropathy  [FreeTextEntry1] : Right lateral foot  [FreeTextEntry2] : 0.3 [FreeTextEntry3] : 0.9 [FreeTextEntry4] : 0.2 [de-identified] : Serous/sanguinous [de-identified] : Expressed comfort post Coban application. No neurovascular deficits noted. [de-identified] : Nai  [de-identified] : Mechanically cleansed with sterile gauze and normal saline. Kerlix  Betadine to phil wound  [TWNoteComboBox4] : Moderate [de-identified] : Macerated [de-identified] : None [de-identified] : None [de-identified] : 100% [de-identified] : No [de-identified] : Multilayer other compression wrap [de-identified] : 3x Weekly [de-identified] : Primary Dressing

## 2024-09-16 NOTE — REVIEW OF SYSTEMS
[Fever] : no fever [Chills] : no chills [Eye Pain] : no eye pain [Red Eyes] : eyes not red [Earache] : no earache [Nosebleeds] : no nosebleeds [Chest Pain] : no chest pain [Shortness Of Breath] : no shortness of breath [Cough] : no cough [Abdominal Pain] : no abdominal pain [Vomiting] : no vomiting [Diarrhea] : no diarrhea [Joint Swelling] : no joint swelling [Limb Pain] : no limb pain [Skin Lesions] : skin lesion [Skin Wound] : no skin wound [Anxiety] : no anxiety [Easy Bleeding] : no tendency for easy bleeding [FreeTextEntry5] : HTN,HLD [FreeTextEntry7] : 40.9 BMI , morbid obesity  [FreeTextEntry8] : Kidney Transplant  [FreeTextEntry9] : Associated Right Diabetic Charcot foot status post right Achilles tendon lengthening, right posterior tibial tendon lengthening, right peroneus brevis tendon detachment and reattachment with exostectomy of the right fifth metatarsal base and plantar cuboid, and right fifth digit proximal phalanx base resection [de-identified] : Right lateral , base 5th metatarsal , clean , granular     erbdvv gkl              ] [de-identified] : IDDM with neuropathy  [de-identified] : MARILUZ

## 2024-09-16 NOTE — PLAN
[FreeTextEntry1] : Continue wound care , patient may benefit from epifix skin substitute , PTR 1 week  Spent 20 minutes for patient care and medical decision making.

## 2024-09-16 NOTE — ASSESSMENT
[Verbal] : Verbal [Written] : Written [Demo] : Demo [Patient] : Patient [Good - alert, interested, motivated] : Good - alert, interested, motivated [Verbalizes knowledge/Understanding] : Verbalizes knowledge/understanding [Dressing changes] : dressing changes [Foot Care] : foot care [Skin Care] : skin care [Signs and symptoms of infection] : sign and symptoms of infection [Nutrition] : nutrition [How and When to Call] : how and when to call [Pain Management] : pain management [Compression Therapy] : compression therapy [Patient responsibility to plan of care] : patient responsibility to plan of care [Glycemic Control] : glycemic control [Stable] : stable [Home] : Home [Ambulatory] : Ambulatory [Not Applicable - Long Term Care/Home Health Agency] : Long Term Care/Home Health Agency: Not Applicable [] : No [FreeTextEntry2] : Infection prevention Localized wound care  Goal remaining pain free regarding wounds Compression therapy  collagen matrix therapy  [FreeTextEntry3] : wound the same in status  [FreeTextEntry4] : Auth submitted for Epifix  dressing changes 3 times a week at [facility] Assessment in 1 week

## 2024-09-18 ENCOUNTER — OUTPATIENT (OUTPATIENT)
Dept: OUTPATIENT SERVICES | Facility: HOSPITAL | Age: 59
LOS: 1 days | Discharge: HOME | End: 2024-09-18
Payer: MEDICARE

## 2024-09-18 ENCOUNTER — APPOINTMENT (OUTPATIENT)
Dept: WOUND CARE | Facility: HOSPITAL | Age: 59
End: 2024-09-18
Payer: MEDICARE

## 2024-09-18 VITALS
BODY MASS INDEX: 41.75 KG/M2 | WEIGHT: 315 LBS | OXYGEN SATURATION: 97 % | DIASTOLIC BLOOD PRESSURE: 77 MMHG | TEMPERATURE: 97.8 F | SYSTOLIC BLOOD PRESSURE: 116 MMHG | HEART RATE: 76 BPM | HEIGHT: 73 IN | RESPIRATION RATE: 18 BRPM

## 2024-09-18 DIAGNOSIS — G47.33 OBSTRUCTIVE SLEEP APNEA (ADULT) (PEDIATRIC): ICD-10-CM

## 2024-09-18 DIAGNOSIS — Z86.16 PERSONAL HISTORY OF COVID-19: ICD-10-CM

## 2024-09-18 DIAGNOSIS — E11.610 TYPE 2 DIABETES MELLITUS WITH DIABETIC NEUROPATHIC ARTHROPATHY: ICD-10-CM

## 2024-09-18 DIAGNOSIS — T81.89XD OTHER COMPLICATIONS OF PROCEDURES, NOT ELSEWHERE CLASSIFIED, SUBSEQUENT ENCOUNTER: ICD-10-CM

## 2024-09-18 DIAGNOSIS — Z79.4 LONG TERM (CURRENT) USE OF INSULIN: ICD-10-CM

## 2024-09-18 DIAGNOSIS — Z87.81 PERSONAL HISTORY OF (HEALED) TRAUMATIC FRACTURE: ICD-10-CM

## 2024-09-18 DIAGNOSIS — Z79.01 LONG TERM (CURRENT) USE OF ANTICOAGULANTS: ICD-10-CM

## 2024-09-18 DIAGNOSIS — E11.621 TYPE 2 DIABETES MELLITUS WITH FOOT ULCER: ICD-10-CM

## 2024-09-18 DIAGNOSIS — Z80.8 FAMILY HISTORY OF MALIGNANT NEOPLASM OF OTHER ORGANS OR SYSTEMS: ICD-10-CM

## 2024-09-18 DIAGNOSIS — Z86.73 PERSONAL HISTORY OF TRANSIENT ISCHEMIC ATTACK (TIA), AND CEREBRAL INFARCTION WITHOUT RESIDUAL DEFICITS: ICD-10-CM
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
## 2024-09-18 DIAGNOSIS — Z85.828 PERSONAL HISTORY OF OTHER MALIGNANT NEOPLASM OF SKIN: ICD-10-CM

## 2024-09-18 DIAGNOSIS — L97.412 NON-PRESSURE CHRONIC ULCER OF RIGHT HEEL AND MIDFOOT WITH FAT LAYER EXPOSED: ICD-10-CM

## 2024-09-18 DIAGNOSIS — Z89.421 ACQUIRED ABSENCE OF OTHER RIGHT TOE(S): Chronic | ICD-10-CM

## 2024-09-18 DIAGNOSIS — Z83.3 FAMILY HISTORY OF DIABETES MELLITUS: ICD-10-CM

## 2024-09-18 DIAGNOSIS — L84 CORNS AND CALLOSITIES: ICD-10-CM

## 2024-09-18 DIAGNOSIS — E78.5 HYPERLIPIDEMIA, UNSPECIFIED: ICD-10-CM
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
## 2024-09-18 DIAGNOSIS — Z80.3 FAMILY HISTORY OF MALIGNANT NEOPLASM OF BREAST: ICD-10-CM

## 2024-09-18 DIAGNOSIS — Z79.899 OTHER LONG TERM (CURRENT) DRUG THERAPY: ICD-10-CM

## 2024-09-18 DIAGNOSIS — Z89.422 ACQUIRED ABSENCE OF OTHER LEFT TOE(S): ICD-10-CM

## 2024-09-18 DIAGNOSIS — Z94.0 KIDNEY TRANSPLANT STATUS: ICD-10-CM

## 2024-09-18 DIAGNOSIS — Z89.421 ACQUIRED ABSENCE OF OTHER RIGHT TOE(S): ICD-10-CM

## 2024-09-18 DIAGNOSIS — Z98.890 OTHER SPECIFIED POSTPROCEDURAL STATES: Chronic | ICD-10-CM

## 2024-09-18 DIAGNOSIS — Z86.718 PERSONAL HISTORY OF OTHER VENOUS THROMBOSIS AND EMBOLISM: ICD-10-CM

## 2024-09-18 PROCEDURE — 29581 APPL MULTLAYER CMPRN SYS LEG: CPT | Mod: RT

## 2024-09-18 PROCEDURE — ZZZZZ: CPT

## 2024-09-20 ENCOUNTER — OUTPATIENT (OUTPATIENT)
Dept: OUTPATIENT SERVICES | Facility: HOSPITAL | Age: 59
LOS: 1 days | Discharge: HOME | End: 2024-09-20
Payer: MEDICARE

## 2024-09-20 ENCOUNTER — APPOINTMENT (OUTPATIENT)
Dept: WOUND CARE | Facility: HOSPITAL | Age: 59
End: 2024-09-20
Payer: MEDICARE

## 2024-09-20 VITALS
TEMPERATURE: 98.2 F | DIASTOLIC BLOOD PRESSURE: 87 MMHG | HEIGHT: 73 IN | WEIGHT: 315 LBS | RESPIRATION RATE: 20 BRPM | SYSTOLIC BLOOD PRESSURE: 130 MMHG | HEART RATE: 88 BPM | OXYGEN SATURATION: 95 % | BODY MASS INDEX: 41.75 KG/M2
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
## 2024-09-20 DIAGNOSIS — L97.412 NON-PRESSURE CHRONIC ULCER OF RIGHT HEEL AND MIDFOOT WITH FAT LAYER EXPOSED: ICD-10-CM

## 2024-09-20 DIAGNOSIS — E11.621 TYPE 2 DIABETES MELLITUS WITH FOOT ULCER: ICD-10-CM
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
## 2024-09-20 DIAGNOSIS — T81.89XD OTHER COMPLICATIONS OF PROCEDURES, NOT ELSEWHERE CLASSIFIED, SUBSEQUENT ENCOUNTER: ICD-10-CM

## 2024-09-20 PROCEDURE — 29581 APPL MULTLAYER CMPRN SYS LEG: CPT | Mod: RT

## 2024-09-20 PROCEDURE — ZZZZZ: CPT

## 2024-09-23 ENCOUNTER — APPOINTMENT (OUTPATIENT)
Dept: WOUND CARE | Facility: HOSPITAL | Age: 59
End: 2024-09-23
Payer: MEDICARE

## 2024-09-23 ENCOUNTER — OUTPATIENT (OUTPATIENT)
Dept: OUTPATIENT SERVICES | Facility: HOSPITAL | Age: 59
LOS: 1 days | Discharge: HOME | End: 2024-09-23
Payer: MEDICARE

## 2024-09-23 VITALS
RESPIRATION RATE: 18 BRPM | WEIGHT: 315 LBS | OXYGEN SATURATION: 93 % | HEART RATE: 77 BPM | SYSTOLIC BLOOD PRESSURE: 136 MMHG | BODY MASS INDEX: 41.75 KG/M2 | TEMPERATURE: 98 F | HEIGHT: 73 IN | DIASTOLIC BLOOD PRESSURE: 72 MMHG

## 2024-09-23 DIAGNOSIS — N18.6 END STAGE RENAL DISEASE: Chronic | ICD-10-CM

## 2024-09-23 DIAGNOSIS — T81.89XD OTHER COMPLICATIONS OF PROCEDURES, NOT ELSEWHERE CLASSIFIED, SUBSEQUENT ENCOUNTER: ICD-10-CM

## 2024-09-23 DIAGNOSIS — Z89.421 ACQUIRED ABSENCE OF OTHER RIGHT TOE(S): Chronic | ICD-10-CM

## 2024-09-23 DIAGNOSIS — Z98.890 OTHER SPECIFIED POSTPROCEDURAL STATES: Chronic | ICD-10-CM

## 2024-09-23 DIAGNOSIS — Z94.0 KIDNEY TRANSPLANT STATUS: Chronic | ICD-10-CM

## 2024-09-23 PROCEDURE — 29581 APPL MULTLAYER CMPRN SYS LEG: CPT | Mod: RT

## 2024-09-23 PROCEDURE — 99213 OFFICE O/P EST LOW 20 MIN: CPT

## 2024-09-23 NOTE — REVIEW OF SYSTEMS
[Skin Lesions] : skin lesion [Fever] : no fever [Chills] : no chills [Eye Pain] : no eye pain [Red Eyes] : eyes not red [Earache] : no earache [Nosebleeds] : no nosebleeds [Chest Pain] : no chest pain [Shortness Of Breath] : no shortness of breath [Cough] : no cough [Abdominal Pain] : no abdominal pain [Vomiting] : no vomiting [Diarrhea] : no diarrhea [Joint Swelling] : no joint swelling [Limb Pain] : no limb pain [Skin Wound] : no skin wound [Anxiety] : no anxiety [Easy Bleeding] : no tendency for easy bleeding [FreeTextEntry5] : HTN,HLD [FreeTextEntry7] : 40.9 BMI , morbid obesity  [FreeTextEntry8] : Kidney Transplant  [FreeTextEntry9] : Associated Right Diabetic Charcot foot status post right Achilles tendon lengthening, right posterior tibial tendon lengthening, right peroneus brevis tendon detachment and reattachment with exostectomy of the right fifth metatarsal base and plantar cuboid, and right fifth digit proximal phalanx base resection [de-identified] : Right plantar midfoot ulcer down to fat ,right lateral surgical site with sutures intact , no dehiscence     erbdvv gkl              ] [de-identified] : IDDM with neuropathy  [de-identified] : MARILUZ

## 2024-09-23 NOTE — VITALS
[Pain related to present condition?] : The patient's  pain is not related to present condition. [] : No [de-identified] : Patient has no pain related to wound.

## 2024-09-23 NOTE — ASSESSMENT
[Verbal] : Verbal [Written] : Written [Demo] : Demo [Patient] : Patient [Good - alert, interested, motivated] : Good - alert, interested, motivated [Verbalizes knowledge/Understanding] : Verbalizes knowledge/understanding [Dressing changes] : dressing changes [Foot Care] : foot care [Skin Care] : skin care [Signs and symptoms of infection] : sign and symptoms of infection [Nutrition] : nutrition [How and When to Call] : how and when to call [Pain Management] : pain management [Compression Therapy] : compression therapy [Patient responsibility to plan of care] : patient responsibility to plan of care [Glycemic Control] : glycemic control [] : Yes [Stable] : stable [Home] : Home [Ambulatory] : Ambulatory [Not Applicable - Long Term Care/Home Health Agency] : Long Term Care/Home Health Agency: Not Applicable [FreeTextEntry2] : Infection prevention Localized wound care  Goal remaining pain free regarding wounds Compression therapy  collagen matrix therapy  [FreeTextEntry4] : Dressing change 1x and then weekly assessment Orthotist consult submitted  F/U 1 week

## 2024-09-23 NOTE — PHYSICAL EXAM
[4 x 4] : 4 x 4  [Abdominal Pad] : Abdominal Pad [0] : left 0 [1+] : left 1+ [Skin Ulcer] : ulcer [Alert] : alert [Oriented to Person] : oriented to person [Oriented to Place] : oriented to place [Oriented to Time] : oriented to time [Calm] : calm [Varicose Veins Of Lower Extremities] : not present [] : not present [Purpura] : no purpura  [Petechiae] : no petechiae [Skin Induration] : no induration [de-identified] : A&Ox3, NAD [de-identified] : HTN, HLD [de-identified] : Diabetic Charcot right foot deformity status post right Achilles tendon lengthening, right posterior tibial tendon lengthening, right peroneus brevis tendon detachment and reattachment with exostectomy of the right fifth metatarsal base and plantar cuboid, and right fifth digit proximal phalanx base resection [de-identified] : Right plantar midfoot ulcer healed. right lateral foot surgical site with central dehiscence and open wound down to skin, subcutaneous tissue, fat, almost healed [de-identified] : Diabetic neuropathy  [FreeTextEntry1] : Right lateral foot  [FreeTextEntry2] : 0.8 [FreeTextEntry3] : 0.3 [FreeTextEntry4] : 0.2 [de-identified] : Serous/sanguinous [de-identified] : callused [de-identified] : Expressed comfort post Coban application. No neurovascular deficits noted. [de-identified] : Nai [de-identified] : Mechanically cleansed with sterile gauze and normal saline. Doug   DPJENN shaved callused periwound skin. Betadine to periwound skin [TWNoteComboBox4] : Moderate [TWNoteComboBox5] : No [de-identified] : No [de-identified] : other [de-identified] : None [de-identified] : None [de-identified] : 100% [de-identified] : No [de-identified] : Multilayer other compression wrap [de-identified] : 2x Weekly [de-identified] : Primary Dressing

## 2024-09-23 NOTE — VITALS
[Pain related to present condition?] : The patient's  pain is not related to present condition. [] : No [de-identified] : Patient has no pain related to wound.

## 2024-09-23 NOTE — HISTORY OF PRESENT ILLNESS
[FreeTextEntry1] : Patient is 58 year old male presents for follow-up status post right foot midfoot plantar exostectomy. Patient relates the right foot dressing was noted with bloody drainage and has been started on Eliquis per cardiologist for new onset Afib. Patient relates that he has been staying off of his right foot and has been utilizing a knee scooter.  9/23/2024 patient seen for follow-up status post right midfoot plantar exostectomy. Pt being seen for right lateral midfoot wound down to skin, subcutaneous tissue, fat from dehiscence, s/p delayed primary closure, almost healed.

## 2024-09-23 NOTE — PHYSICAL EXAM
[4 x 4] : 4 x 4  [Abdominal Pad] : Abdominal Pad [0] : left 0 [1+] : left 1+ [Skin Ulcer] : ulcer [Alert] : alert [Oriented to Person] : oriented to person [Oriented to Place] : oriented to place [Oriented to Time] : oriented to time [Calm] : calm [Varicose Veins Of Lower Extremities] : not present [] : not present [Purpura] : no purpura  [Petechiae] : no petechiae [Skin Induration] : no induration [de-identified] : A&Ox3, NAD [de-identified] : HTN, HLD [de-identified] : Diabetic Charcot right foot deformity status post right Achilles tendon lengthening, right posterior tibial tendon lengthening, right peroneus brevis tendon detachment and reattachment with exostectomy of the right fifth metatarsal base and plantar cuboid, and right fifth digit proximal phalanx base resection [de-identified] : Right plantar midfoot ulcer healed. right lateral foot surgical site with central dehiscence and open wound down to skin, subcutaneous tissue, fat, almost healed [de-identified] : Diabetic neuropathy  [FreeTextEntry1] : Right lateral foot  [FreeTextEntry2] : 0.8 [FreeTextEntry3] : 0.3 [FreeTextEntry4] : 0.2 [de-identified] : Serous/sanguinous [de-identified] : callused [de-identified] : Expressed comfort post Coban application. No neurovascular deficits noted. [de-identified] : Nai [de-identified] : Mechanically cleansed with sterile gauze and normal saline. Doug   DPJENN shaved callused periwound skin. Betadine to periwound skin [TWNoteComboBox4] : Moderate [TWNoteComboBox5] : No [de-identified] : No [de-identified] : other [de-identified] : None [de-identified] : None [de-identified] : 100% [de-identified] : No [de-identified] : Multilayer other compression wrap [de-identified] : 2x Weekly [de-identified] : Primary Dressing

## 2024-09-23 NOTE — REVIEW OF SYSTEMS
[Skin Lesions] : skin lesion [Fever] : no fever [Chills] : no chills [Eye Pain] : no eye pain [Red Eyes] : eyes not red [Earache] : no earache [Nosebleeds] : no nosebleeds [Chest Pain] : no chest pain [Shortness Of Breath] : no shortness of breath [Cough] : no cough [Abdominal Pain] : no abdominal pain [Vomiting] : no vomiting [Diarrhea] : no diarrhea [Joint Swelling] : no joint swelling [Limb Pain] : no limb pain [Skin Wound] : no skin wound [Anxiety] : no anxiety [Easy Bleeding] : no tendency for easy bleeding [FreeTextEntry5] : HTN,HLD [FreeTextEntry7] : 40.9 BMI , morbid obesity  [FreeTextEntry8] : Kidney Transplant  [FreeTextEntry9] : Associated Right Diabetic Charcot foot status post right Achilles tendon lengthening, right posterior tibial tendon lengthening, right peroneus brevis tendon detachment and reattachment with exostectomy of the right fifth metatarsal base and plantar cuboid, and right fifth digit proximal phalanx base resection [de-identified] : Right plantar midfoot ulcer down to fat ,right lateral surgical site with sutures intact , no dehiscence     erbdvv gkl              ] [de-identified] : IDDM with neuropathy  [de-identified] : MARILUZ

## 2024-09-25 DIAGNOSIS — Z83.3 FAMILY HISTORY OF DIABETES MELLITUS: ICD-10-CM

## 2024-09-25 DIAGNOSIS — Z79.01 LONG TERM (CURRENT) USE OF ANTICOAGULANTS: ICD-10-CM

## 2024-09-25 DIAGNOSIS — Z89.421 ACQUIRED ABSENCE OF OTHER RIGHT TOE(S): ICD-10-CM

## 2024-09-25 DIAGNOSIS — Z86.718 PERSONAL HISTORY OF OTHER VENOUS THROMBOSIS AND EMBOLISM: ICD-10-CM

## 2024-09-25 DIAGNOSIS — Z86.16 PERSONAL HISTORY OF COVID-19: ICD-10-CM

## 2024-09-25 DIAGNOSIS — Z80.3 FAMILY HISTORY OF MALIGNANT NEOPLASM OF BREAST: ICD-10-CM
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
## 2024-09-25 DIAGNOSIS — Z89.422 ACQUIRED ABSENCE OF OTHER LEFT TOE(S): ICD-10-CM

## 2024-09-25 DIAGNOSIS — Z79.899 OTHER LONG TERM (CURRENT) DRUG THERAPY: ICD-10-CM

## 2024-09-25 DIAGNOSIS — G47.33 OBSTRUCTIVE SLEEP APNEA (ADULT) (PEDIATRIC): ICD-10-CM

## 2024-09-25 DIAGNOSIS — Z85.828 PERSONAL HISTORY OF OTHER MALIGNANT NEOPLASM OF SKIN: ICD-10-CM

## 2024-09-25 DIAGNOSIS — Z79.4 LONG TERM (CURRENT) USE OF INSULIN: ICD-10-CM

## 2024-09-25 DIAGNOSIS — E11.610 TYPE 2 DIABETES MELLITUS WITH DIABETIC NEUROPATHIC ARTHROPATHY: ICD-10-CM

## 2024-09-25 DIAGNOSIS — Z87.81 PERSONAL HISTORY OF (HEALED) TRAUMATIC FRACTURE: ICD-10-CM

## 2024-09-25 DIAGNOSIS — L97.412 NON-PRESSURE CHRONIC ULCER OF RIGHT HEEL AND MIDFOOT WITH FAT LAYER EXPOSED: ICD-10-CM

## 2024-09-25 DIAGNOSIS — Z80.8 FAMILY HISTORY OF MALIGNANT NEOPLASM OF OTHER ORGANS OR SYSTEMS: ICD-10-CM

## 2024-09-25 DIAGNOSIS — E11.621 TYPE 2 DIABETES MELLITUS WITH FOOT ULCER: ICD-10-CM

## 2024-09-25 DIAGNOSIS — T81.89XD OTHER COMPLICATIONS OF PROCEDURES, NOT ELSEWHERE CLASSIFIED, SUBSEQUENT ENCOUNTER: ICD-10-CM

## 2024-09-25 DIAGNOSIS — Z86.73 PERSONAL HISTORY OF TRANSIENT ISCHEMIC ATTACK (TIA), AND CEREBRAL INFARCTION WITHOUT RESIDUAL DEFICITS: ICD-10-CM

## 2024-09-25 DIAGNOSIS — E78.5 HYPERLIPIDEMIA, UNSPECIFIED: ICD-10-CM

## 2024-09-25 DIAGNOSIS — L84 CORNS AND CALLOSITIES: ICD-10-CM

## 2024-09-25 DIAGNOSIS — Z94.0 KIDNEY TRANSPLANT STATUS: ICD-10-CM

## 2024-09-27 ENCOUNTER — OUTPATIENT (OUTPATIENT)
Dept: OUTPATIENT SERVICES | Facility: HOSPITAL | Age: 59
LOS: 1 days | Discharge: HOME | End: 2024-09-27
Payer: MEDICARE

## 2024-09-27 ENCOUNTER — APPOINTMENT (OUTPATIENT)
Dept: WOUND CARE | Facility: HOSPITAL | Age: 59
End: 2024-09-27
Payer: MEDICARE

## 2024-09-27 VITALS
HEIGHT: 73 IN | OXYGEN SATURATION: 92 % | WEIGHT: 315 LBS | DIASTOLIC BLOOD PRESSURE: 83 MMHG | RESPIRATION RATE: 18 BRPM | SYSTOLIC BLOOD PRESSURE: 146 MMHG | HEART RATE: 60 BPM | TEMPERATURE: 97.8 F | BODY MASS INDEX: 41.75 KG/M2

## 2024-09-27 DIAGNOSIS — T81.89XD OTHER COMPLICATIONS OF PROCEDURES, NOT ELSEWHERE CLASSIFIED, SUBSEQUENT ENCOUNTER: ICD-10-CM

## 2024-09-27 DIAGNOSIS — E11.621 TYPE 2 DIABETES MELLITUS WITH FOOT ULCER: ICD-10-CM

## 2024-09-27 DIAGNOSIS — N18.6 END STAGE RENAL DISEASE: Chronic | ICD-10-CM

## 2024-09-27 DIAGNOSIS — Z94.0 KIDNEY TRANSPLANT STATUS: Chronic | ICD-10-CM
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
## 2024-09-27 DIAGNOSIS — L97.412 NON-PRESSURE CHRONIC ULCER OF RIGHT HEEL AND MIDFOOT WITH FAT LAYER EXPOSED: ICD-10-CM

## 2024-09-27 DIAGNOSIS — Z89.421 ACQUIRED ABSENCE OF OTHER RIGHT TOE(S): Chronic | ICD-10-CM

## 2024-09-27 DIAGNOSIS — Z98.890 OTHER SPECIFIED POSTPROCEDURAL STATES: Chronic | ICD-10-CM

## 2024-09-27 DIAGNOSIS — Z89.431 ACQUIRED ABSENCE OF RIGHT FOOT: ICD-10-CM
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
## 2024-09-27 PROCEDURE — ZZZZZ: CPT

## 2024-09-27 PROCEDURE — 29581 APPL MULTLAYER CMPRN SYS LEG: CPT | Mod: RT

## 2024-09-30 ENCOUNTER — OUTPATIENT (OUTPATIENT)
Dept: OUTPATIENT SERVICES | Facility: HOSPITAL | Age: 59
LOS: 1 days | Discharge: HOME | End: 2024-09-30
Payer: MEDICARE

## 2024-09-30 ENCOUNTER — APPOINTMENT (OUTPATIENT)
Dept: WOUND CARE | Facility: HOSPITAL | Age: 59
End: 2024-09-30
Payer: MEDICARE

## 2024-09-30 VITALS
SYSTOLIC BLOOD PRESSURE: 135 MMHG | HEIGHT: 73 IN | HEART RATE: 80 BPM | RESPIRATION RATE: 18 BRPM | BODY MASS INDEX: 41.75 KG/M2 | DIASTOLIC BLOOD PRESSURE: 79 MMHG | TEMPERATURE: 99.5 F | WEIGHT: 315 LBS | OXYGEN SATURATION: 92 %

## 2024-09-30 DIAGNOSIS — T81.89XD OTHER COMPLICATIONS OF PROCEDURES, NOT ELSEWHERE CLASSIFIED, SUBSEQUENT ENCOUNTER: ICD-10-CM

## 2024-09-30 DIAGNOSIS — Z98.890 OTHER SPECIFIED POSTPROCEDURAL STATES: Chronic | ICD-10-CM

## 2024-09-30 PROCEDURE — G0463: CPT

## 2024-09-30 PROCEDURE — 99213 OFFICE O/P EST LOW 20 MIN: CPT

## 2024-09-30 NOTE — PHYSICAL EXAM
[4 x 4] : 4 x 4  [0] : left 0 [1+] : left 1+ [Varicose Veins Of Lower Extremities] : not present [] : not present [Purpura] : no purpura  [Petechiae] : no petechiae [Skin Ulcer] : ulcer [Skin Induration] : no induration [Alert] : alert [Oriented to Person] : oriented to person [Oriented to Place] : oriented to place [Oriented to Time] : oriented to time [Calm] : calm [de-identified] : A&Ox3, NAD [de-identified] : HTN, HLD [de-identified] : Diabetic Charcot right foot deformity status post right Achilles tendon lengthening, right posterior tibial tendon lengthening, right peroneus brevis tendon detachment and reattachment with exostectomy of the right fifth metatarsal base and plantar cuboid, and right fifth digit proximal phalanx base resection [de-identified] : Right plantar midfoot ulcer healed. right lateral foot surgical site with central dehiscence and open wound down to skin, subcutaneous tissue, fat, healed [de-identified] : Diabetic neuropathy  [FreeTextEntry1] : Right lateral foot - callus - no open wounds.  [de-identified] : Callus  [de-identified] : Dry dressing  [de-identified] : Mechanically cleansed with sterile gauze and normal saline. Kerlix * Dressing may be removed when patient gets home. White sock to be used.  [TWNoteComboBox4] : None [de-identified] : other [de-identified] : None [de-identified] : None [de-identified] : No [de-identified] : Other

## 2024-09-30 NOTE — ED ADULT TRIAGE NOTE - PAIN: PRESENCE, MLM
Quality 226: Preventive Care And Screening: Tobacco Use: Screening And Cessation Intervention: Patient screened for tobacco use and is an ex/non-smoker Detail Level: Detailed denies pain/discomfort

## 2024-09-30 NOTE — PHYSICAL EXAM
[4 x 4] : 4 x 4  [0] : left 0 [1+] : left 1+ [Varicose Veins Of Lower Extremities] : not present [] : not present [Purpura] : no purpura  [Petechiae] : no petechiae [Skin Ulcer] : ulcer [Skin Induration] : no induration [Alert] : alert [Oriented to Person] : oriented to person [Oriented to Place] : oriented to place [Oriented to Time] : oriented to time [Calm] : calm [de-identified] : A&Ox3, NAD [de-identified] : HTN, HLD [de-identified] : Diabetic Charcot right foot deformity status post right Achilles tendon lengthening, right posterior tibial tendon lengthening, right peroneus brevis tendon detachment and reattachment with exostectomy of the right fifth metatarsal base and plantar cuboid, and right fifth digit proximal phalanx base resection [de-identified] : Right plantar midfoot ulcer healed. right lateral foot surgical site with central dehiscence and open wound down to skin, subcutaneous tissue, fat, healed [de-identified] : Diabetic neuropathy  [FreeTextEntry1] : Right lateral foot - callus - no open wounds.  [de-identified] : Callus  [de-identified] : Dry dressing  [de-identified] : Mechanically cleansed with sterile gauze and normal saline. Kerlix * Dressing may be removed when patient gets home. White sock to be used.  [TWNoteComboBox4] : None [de-identified] : other [de-identified] : None [de-identified] : None [de-identified] : No [de-identified] : Other

## 2024-09-30 NOTE — ASSESSMENT
[Verbal] : Verbal [Demo] : Demo [Patient] : Patient [Family member] : Family member [Good - alert, interested, motivated] : Good - alert, interested, motivated [Verbalizes knowledge/Understanding] : Verbalizes knowledge/understanding [Dressing changes] : dressing changes [Foot Care] : foot care [Skin Care] : skin care [Signs and symptoms of infection] : sign and symptoms of infection [Nutrition] : nutrition [How and When to Call] : how and when to call [Pain Management] : pain management [Off-loading] : off-loading [Patient responsibility to plan of care] : patient responsibility to plan of care [Glycemic Control] : glycemic control [Stable] : stable [Home] : Home [Ambulatory] : Ambulatory [Not Applicable - Long Term Care/Home Health Agency] : Long Term Care/Home Health Agency: Not Applicable [] : No [FreeTextEntry2] : Infection prevention Localized wound care  Goal remaining pain free regarding wounds [FreeTextEntry4] : pt cleared to shower Pt told to use white sock and monitor for drainage.  Assessment 10/4/24.

## 2024-09-30 NOTE — HISTORY OF PRESENT ILLNESS
[FreeTextEntry1] : Patient is 58 year old male presents for follow-up status post right foot midfoot plantar exostectomy. Patient relates the right foot dressing was noted with bloody drainage and has been started on Eliquis per cardiologist for new onset Afib. Patient relates that he has been staying off of his right foot and has been utilizing a knee scooter.  9/30/2024 patient seen for follow-up status post right midfoot plantar exostectomy. Pt being seen for right lateral midfoot wound down to skin, subcutaneous tissue, fat from dehiscence, s/p delayed primary closure, healed.

## 2024-09-30 NOTE — REVIEW OF SYSTEMS
[Fever] : no fever [Chills] : no chills [Eye Pain] : no eye pain [Red Eyes] : eyes not red [Earache] : no earache [Nosebleeds] : no nosebleeds [Chest Pain] : no chest pain [Shortness Of Breath] : no shortness of breath [Cough] : no cough [Abdominal Pain] : no abdominal pain [Vomiting] : no vomiting [Diarrhea] : no diarrhea [Joint Swelling] : no joint swelling [Limb Pain] : no limb pain [Skin Lesions] : skin lesion [Skin Wound] : no skin wound [Anxiety] : no anxiety [Easy Bleeding] : no tendency for easy bleeding [FreeTextEntry5] : HTN,HLD [FreeTextEntry7] : 40.9 BMI , morbid obesity  [FreeTextEntry8] : Kidney Transplant  [FreeTextEntry9] : Associated Right Diabetic Charcot foot status post right Achilles tendon lengthening, right posterior tibial tendon lengthening, right peroneus brevis tendon detachment and reattachment with exostectomy of the right fifth metatarsal base and plantar cuboid, and right fifth digit proximal phalanx base resection [de-identified] : Right plantar midfoot ulcer down to fat ,right lateral surgical site with sutures intact , no dehiscence     erbdvv gkl              ] [de-identified] : IDDM with neuropathy  [de-identified] : MARILUZ

## 2024-09-30 NOTE — REVIEW OF SYSTEMS
[Fever] : no fever [Chills] : no chills [Eye Pain] : no eye pain [Red Eyes] : eyes not red [Earache] : no earache [Nosebleeds] : no nosebleeds [Chest Pain] : no chest pain [Shortness Of Breath] : no shortness of breath [Cough] : no cough [Abdominal Pain] : no abdominal pain [Vomiting] : no vomiting [Diarrhea] : no diarrhea [Joint Swelling] : no joint swelling [Limb Pain] : no limb pain [Skin Lesions] : skin lesion [Skin Wound] : no skin wound [Anxiety] : no anxiety [Easy Bleeding] : no tendency for easy bleeding [FreeTextEntry5] : HTN,HLD [FreeTextEntry7] : 40.9 BMI , morbid obesity  [FreeTextEntry8] : Kidney Transplant  [FreeTextEntry9] : Associated Right Diabetic Charcot foot status post right Achilles tendon lengthening, right posterior tibial tendon lengthening, right peroneus brevis tendon detachment and reattachment with exostectomy of the right fifth metatarsal base and plantar cuboid, and right fifth digit proximal phalanx base resection [de-identified] : Right plantar midfoot ulcer down to fat ,right lateral surgical site with sutures intact , no dehiscence     erbdvv gkl              ] [de-identified] : IDDM with neuropathy  [de-identified] : MARILUZ

## 2024-10-02 DIAGNOSIS — Z86.718 PERSONAL HISTORY OF OTHER VENOUS THROMBOSIS AND EMBOLISM: ICD-10-CM

## 2024-10-02 DIAGNOSIS — Z89.422 ACQUIRED ABSENCE OF OTHER LEFT TOE(S): ICD-10-CM

## 2024-10-02 DIAGNOSIS — L97.412 NON-PRESSURE CHRONIC ULCER OF RIGHT HEEL AND MIDFOOT WITH FAT LAYER EXPOSED: ICD-10-CM

## 2024-10-02 DIAGNOSIS — Z89.421 ACQUIRED ABSENCE OF OTHER RIGHT TOE(S): ICD-10-CM

## 2024-10-02 DIAGNOSIS — Z79.4 LONG TERM (CURRENT) USE OF INSULIN: ICD-10-CM

## 2024-10-02 DIAGNOSIS — Z85.828 PERSONAL HISTORY OF OTHER MALIGNANT NEOPLASM OF SKIN: ICD-10-CM

## 2024-10-02 DIAGNOSIS — T81.89XD OTHER COMPLICATIONS OF PROCEDURES, NOT ELSEWHERE CLASSIFIED, SUBSEQUENT ENCOUNTER: ICD-10-CM

## 2024-10-02 DIAGNOSIS — Z94.0 KIDNEY TRANSPLANT STATUS: ICD-10-CM

## 2024-10-02 DIAGNOSIS — E78.5 HYPERLIPIDEMIA, UNSPECIFIED: ICD-10-CM

## 2024-10-02 DIAGNOSIS — E11.610 TYPE 2 DIABETES MELLITUS WITH DIABETIC NEUROPATHIC ARTHROPATHY: ICD-10-CM
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
## 2024-10-02 DIAGNOSIS — E11.621 TYPE 2 DIABETES MELLITUS WITH FOOT ULCER: ICD-10-CM

## 2024-10-02 DIAGNOSIS — Z83.3 FAMILY HISTORY OF DIABETES MELLITUS: ICD-10-CM

## 2024-10-02 DIAGNOSIS — L84 CORNS AND CALLOSITIES: ICD-10-CM

## 2024-10-02 DIAGNOSIS — Z79.01 LONG TERM (CURRENT) USE OF ANTICOAGULANTS: ICD-10-CM
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
## 2024-10-02 DIAGNOSIS — Z86.16 PERSONAL HISTORY OF COVID-19: ICD-10-CM

## 2024-10-02 DIAGNOSIS — Z80.8 FAMILY HISTORY OF MALIGNANT NEOPLASM OF OTHER ORGANS OR SYSTEMS: ICD-10-CM

## 2024-10-02 DIAGNOSIS — Z80.3 FAMILY HISTORY OF MALIGNANT NEOPLASM OF BREAST: ICD-10-CM

## 2024-10-02 DIAGNOSIS — Z87.81 PERSONAL HISTORY OF (HEALED) TRAUMATIC FRACTURE: ICD-10-CM

## 2024-10-02 DIAGNOSIS — Z86.73 PERSONAL HISTORY OF TRANSIENT ISCHEMIC ATTACK (TIA), AND CEREBRAL INFARCTION WITHOUT RESIDUAL DEFICITS: ICD-10-CM

## 2024-10-02 DIAGNOSIS — Z79.899 OTHER LONG TERM (CURRENT) DRUG THERAPY: ICD-10-CM

## 2024-10-02 DIAGNOSIS — G47.33 OBSTRUCTIVE SLEEP APNEA (ADULT) (PEDIATRIC): ICD-10-CM

## 2024-10-04 ENCOUNTER — OUTPATIENT (OUTPATIENT)
Dept: OUTPATIENT SERVICES | Facility: HOSPITAL | Age: 59
LOS: 1 days | Discharge: HOME | End: 2024-10-04
Payer: MEDICARE

## 2024-10-04 ENCOUNTER — APPOINTMENT (OUTPATIENT)
Dept: WOUND CARE | Facility: HOSPITAL | Age: 59
End: 2024-10-04
Payer: MEDICARE

## 2024-10-04 VITALS
BODY MASS INDEX: 41.75 KG/M2 | HEART RATE: 70 BPM | HEIGHT: 73 IN | TEMPERATURE: 97.9 F | RESPIRATION RATE: 81 BRPM | WEIGHT: 315 LBS | SYSTOLIC BLOOD PRESSURE: 103 MMHG | DIASTOLIC BLOOD PRESSURE: 65 MMHG | OXYGEN SATURATION: 95 %

## 2024-10-04 DIAGNOSIS — T81.89XD OTHER COMPLICATIONS OF PROCEDURES, NOT ELSEWHERE CLASSIFIED, SUBSEQUENT ENCOUNTER: ICD-10-CM

## 2024-10-04 DIAGNOSIS — N18.6 END STAGE RENAL DISEASE: Chronic | ICD-10-CM

## 2024-10-04 DIAGNOSIS — Z89.421 ACQUIRED ABSENCE OF OTHER RIGHT TOE(S): Chronic | ICD-10-CM

## 2024-10-04 DIAGNOSIS — Z98.890 OTHER SPECIFIED POSTPROCEDURAL STATES: Chronic | ICD-10-CM

## 2024-10-04 DIAGNOSIS — Z94.0 KIDNEY TRANSPLANT STATUS: Chronic | ICD-10-CM

## 2024-10-04 PROCEDURE — 99213 OFFICE O/P EST LOW 20 MIN: CPT

## 2024-10-04 NOTE — PHYSICAL EXAM
[4 x 4] : 4 x 4  [0] : left 0 [1+] : left 1+ [Varicose Veins Of Lower Extremities] : not present [] : not present [Purpura] : no purpura  [Petechiae] : no petechiae [Skin Ulcer] : ulcer [Skin Induration] : no induration [Alert] : alert [Oriented to Person] : oriented to person [Oriented to Place] : oriented to place [Oriented to Time] : oriented to time [Calm] : calm [de-identified] : A&Ox3, NAD [de-identified] : HTN, HLD [de-identified] : Diabetic Charcot right foot deformity status post right Achilles tendon lengthening, right posterior tibial tendon lengthening, right peroneus brevis tendon detachment and reattachment with exostectomy of the right fifth metatarsal base and plantar cuboid, and right fifth digit proximal phalanx base resection [de-identified] : Right plantar midfoot ulcer healed. right lateral foot surgical site with central dehiscence and open wound down to skin, subcutaneous tissue, fat, healed [de-identified] : Diabetic neuropathy  [FreeTextEntry1] : Right lateral foot - callus - no open wounds.  [de-identified] : Callus removed [de-identified] : betadine today only [de-identified] : Mechanically cleansed with sterile gauze and normal saline. Kerlix  [TWNoteComboBox4] : None [de-identified] : other [de-identified] : None [de-identified] : None [de-identified] : No [de-identified] : Other

## 2024-10-04 NOTE — ASSESSMENT
[Verbal] : Verbal [Demo] : Demo [Patient] : Patient [Family member] : Family member [Good - alert, interested, motivated] : Good - alert, interested, motivated [Verbalizes knowledge/Understanding] : Verbalizes knowledge/understanding [Dressing changes] : dressing changes [Foot Care] : foot care [Skin Care] : skin care [Signs and symptoms of infection] : sign and symptoms of infection [Nutrition] : nutrition [How and When to Call] : how and when to call [Pain Management] : pain management [Off-loading] : off-loading [Patient responsibility to plan of care] : patient responsibility to plan of care [Glycemic Control] : glycemic control [Stable] : stable [Home] : Home [Ambulatory] : Ambulatory [Not Applicable - Long Term Care/Home Health Agency] : Long Term Care/Home Health Agency: Not Applicable [] : No [FreeTextEntry2] : Infection prevention Localized wound care  Goal remaining pain free regarding wounds [FreeTextEntry4] : pt cleared to shower Right lower leg edema Callous removed betadine today only Pt told to use white sock and monitor for drainage.  Vasculer studies PHUONG/PVR Bilateral Venous duplex submitted

## 2024-10-04 NOTE — REVIEW OF SYSTEMS
[Fever] : no fever [Chills] : no chills [Eye Pain] : no eye pain [Red Eyes] : eyes not red [Earache] : no earache [Nosebleeds] : no nosebleeds [Chest Pain] : no chest pain [Shortness Of Breath] : no shortness of breath [Cough] : no cough [Abdominal Pain] : no abdominal pain [Vomiting] : no vomiting [Diarrhea] : no diarrhea [Joint Swelling] : no joint swelling [Limb Pain] : no limb pain [Skin Lesions] : skin lesion [Skin Wound] : no skin wound [Anxiety] : no anxiety [Easy Bleeding] : no tendency for easy bleeding [FreeTextEntry5] : HTN,HLD [FreeTextEntry7] : 40.9 BMI , morbid obesity  [FreeTextEntry8] : Kidney Transplant  [FreeTextEntry9] : Associated Right Diabetic Charcot foot status post right Achilles tendon lengthening, right posterior tibial tendon lengthening, right peroneus brevis tendon detachment and reattachment with exostectomy of the right fifth metatarsal base and plantar cuboid, and right fifth digit proximal phalanx base resection [de-identified] : Right plantar midfoot ulcer down to fat ,right lateral surgical site with sutures intact , no dehiscence     erbdvv gkl              ] [de-identified] : IDDM with neuropathy  [de-identified] : MARILUZ

## 2024-10-06 DIAGNOSIS — Z86.73 PERSONAL HISTORY OF TRANSIENT ISCHEMIC ATTACK (TIA), AND CEREBRAL INFARCTION WITHOUT RESIDUAL DEFICITS: ICD-10-CM

## 2024-10-06 DIAGNOSIS — Z79.01 LONG TERM (CURRENT) USE OF ANTICOAGULANTS: ICD-10-CM

## 2024-10-06 DIAGNOSIS — Z79.899 OTHER LONG TERM (CURRENT) DRUG THERAPY: ICD-10-CM

## 2024-10-06 DIAGNOSIS — Z83.3 FAMILY HISTORY OF DIABETES MELLITUS: ICD-10-CM

## 2024-10-06 DIAGNOSIS — L84 CORNS AND CALLOSITIES: ICD-10-CM
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
## 2024-10-06 DIAGNOSIS — Z85.828 PERSONAL HISTORY OF OTHER MALIGNANT NEOPLASM OF SKIN: ICD-10-CM

## 2024-10-06 DIAGNOSIS — T81.89XD OTHER COMPLICATIONS OF PROCEDURES, NOT ELSEWHERE CLASSIFIED, SUBSEQUENT ENCOUNTER: ICD-10-CM

## 2024-10-06 DIAGNOSIS — Z80.8 FAMILY HISTORY OF MALIGNANT NEOPLASM OF OTHER ORGANS OR SYSTEMS: ICD-10-CM

## 2024-10-06 DIAGNOSIS — L97.412 NON-PRESSURE CHRONIC ULCER OF RIGHT HEEL AND MIDFOOT WITH FAT LAYER EXPOSED: ICD-10-CM

## 2024-10-06 DIAGNOSIS — Z87.81 PERSONAL HISTORY OF (HEALED) TRAUMATIC FRACTURE: ICD-10-CM

## 2024-10-06 DIAGNOSIS — Z86.718 PERSONAL HISTORY OF OTHER VENOUS THROMBOSIS AND EMBOLISM: ICD-10-CM

## 2024-10-06 DIAGNOSIS — Z89.421 ACQUIRED ABSENCE OF OTHER RIGHT TOE(S): ICD-10-CM

## 2024-10-06 DIAGNOSIS — E11.621 TYPE 2 DIABETES MELLITUS WITH FOOT ULCER: ICD-10-CM

## 2024-10-06 DIAGNOSIS — Z79.4 LONG TERM (CURRENT) USE OF INSULIN: ICD-10-CM

## 2024-10-06 DIAGNOSIS — Z80.3 FAMILY HISTORY OF MALIGNANT NEOPLASM OF BREAST: ICD-10-CM

## 2024-10-06 DIAGNOSIS — E11.610 TYPE 2 DIABETES MELLITUS WITH DIABETIC NEUROPATHIC ARTHROPATHY: ICD-10-CM

## 2024-10-06 DIAGNOSIS — E78.5 HYPERLIPIDEMIA, UNSPECIFIED: ICD-10-CM

## 2024-10-06 DIAGNOSIS — Z86.16 PERSONAL HISTORY OF COVID-19: ICD-10-CM

## 2024-10-06 DIAGNOSIS — Z94.0 KIDNEY TRANSPLANT STATUS: ICD-10-CM

## 2024-10-06 DIAGNOSIS — Z89.422 ACQUIRED ABSENCE OF OTHER LEFT TOE(S): ICD-10-CM
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
## 2024-10-06 DIAGNOSIS — G47.33 OBSTRUCTIVE SLEEP APNEA (ADULT) (PEDIATRIC): ICD-10-CM

## 2024-10-12 ENCOUNTER — APPOINTMENT (OUTPATIENT)
Dept: WOUND CARE | Facility: HOSPITAL | Age: 59
End: 2024-10-12
Payer: MEDICARE

## 2024-10-12 ENCOUNTER — OUTPATIENT (OUTPATIENT)
Dept: OUTPATIENT SERVICES | Facility: HOSPITAL | Age: 59
LOS: 1 days | Discharge: HOME | End: 2024-10-12
Payer: MEDICARE

## 2024-10-12 VITALS
RESPIRATION RATE: 17 BRPM | BODY MASS INDEX: 41.75 KG/M2 | DIASTOLIC BLOOD PRESSURE: 77 MMHG | SYSTOLIC BLOOD PRESSURE: 125 MMHG | TEMPERATURE: 98.1 F | HEIGHT: 73 IN | HEART RATE: 68 BPM | WEIGHT: 315 LBS | OXYGEN SATURATION: 95 %

## 2024-10-12 DIAGNOSIS — Z89.421 ACQUIRED ABSENCE OF OTHER RIGHT TOE(S): Chronic | ICD-10-CM

## 2024-10-12 DIAGNOSIS — L97.509 TYPE 2 DIABETES MELLITUS WITH FOOT ULCER: ICD-10-CM

## 2024-10-12 DIAGNOSIS — E11.621 TYPE 2 DIABETES MELLITUS WITH FOOT ULCER: ICD-10-CM

## 2024-10-12 DIAGNOSIS — Z94.0 KIDNEY TRANSPLANT STATUS: Chronic | ICD-10-CM

## 2024-10-12 DIAGNOSIS — Z98.890 OTHER SPECIFIED POSTPROCEDURAL STATES: Chronic | ICD-10-CM

## 2024-10-12 DIAGNOSIS — L97.412 NON-PRESSURE CHRONIC ULCER OF RIGHT HEEL AND MIDFOOT WITH FAT LAYER EXPOSED: ICD-10-CM

## 2024-10-12 DIAGNOSIS — L84 CORNS AND CALLOSITIES: ICD-10-CM

## 2024-10-12 DIAGNOSIS — N18.6 END STAGE RENAL DISEASE: Chronic | ICD-10-CM

## 2024-10-12 DIAGNOSIS — E11.610 TYPE 2 DIABETES MELLITUS WITH DIABETIC NEUROPATHIC ARTHROPATHY: ICD-10-CM

## 2024-10-12 DIAGNOSIS — T81.89XD OTHER COMPLICATIONS OF PROCEDURES, NOT ELSEWHERE CLASSIFIED, SUBSEQUENT ENCOUNTER: ICD-10-CM

## 2024-10-12 PROCEDURE — 99213 OFFICE O/P EST LOW 20 MIN: CPT

## 2024-10-13 DIAGNOSIS — Z89.422 ACQUIRED ABSENCE OF OTHER LEFT TOE(S): ICD-10-CM

## 2024-10-13 DIAGNOSIS — Z85.828 PERSONAL HISTORY OF OTHER MALIGNANT NEOPLASM OF SKIN: ICD-10-CM
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
## 2024-10-13 DIAGNOSIS — L97.412 NON-PRESSURE CHRONIC ULCER OF RIGHT HEEL AND MIDFOOT WITH FAT LAYER EXPOSED: ICD-10-CM

## 2024-10-13 DIAGNOSIS — Z79.01 LONG TERM (CURRENT) USE OF ANTICOAGULANTS: ICD-10-CM

## 2024-10-13 DIAGNOSIS — G47.33 OBSTRUCTIVE SLEEP APNEA (ADULT) (PEDIATRIC): ICD-10-CM

## 2024-10-13 DIAGNOSIS — E78.5 HYPERLIPIDEMIA, UNSPECIFIED: ICD-10-CM

## 2024-10-13 DIAGNOSIS — L84 CORNS AND CALLOSITIES: ICD-10-CM

## 2024-10-13 DIAGNOSIS — Z80.3 FAMILY HISTORY OF MALIGNANT NEOPLASM OF BREAST: ICD-10-CM

## 2024-10-13 DIAGNOSIS — E11.610 TYPE 2 DIABETES MELLITUS WITH DIABETIC NEUROPATHIC ARTHROPATHY: ICD-10-CM
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
## 2024-10-13 DIAGNOSIS — Z86.73 PERSONAL HISTORY OF TRANSIENT ISCHEMIC ATTACK (TIA), AND CEREBRAL INFARCTION WITHOUT RESIDUAL DEFICITS: ICD-10-CM

## 2024-10-13 DIAGNOSIS — Z86.718 PERSONAL HISTORY OF OTHER VENOUS THROMBOSIS AND EMBOLISM: ICD-10-CM

## 2024-10-13 DIAGNOSIS — Z89.421 ACQUIRED ABSENCE OF OTHER RIGHT TOE(S): ICD-10-CM

## 2024-10-13 DIAGNOSIS — Z79.899 OTHER LONG TERM (CURRENT) DRUG THERAPY: ICD-10-CM

## 2024-10-13 DIAGNOSIS — Z87.81 PERSONAL HISTORY OF (HEALED) TRAUMATIC FRACTURE: ICD-10-CM

## 2024-10-13 DIAGNOSIS — T81.89XD OTHER COMPLICATIONS OF PROCEDURES, NOT ELSEWHERE CLASSIFIED, SUBSEQUENT ENCOUNTER: ICD-10-CM

## 2024-10-13 DIAGNOSIS — Z79.4 LONG TERM (CURRENT) USE OF INSULIN: ICD-10-CM

## 2024-10-13 DIAGNOSIS — Z86.16 PERSONAL HISTORY OF COVID-19: ICD-10-CM

## 2024-10-13 DIAGNOSIS — Z80.8 FAMILY HISTORY OF MALIGNANT NEOPLASM OF OTHER ORGANS OR SYSTEMS: ICD-10-CM

## 2024-10-13 DIAGNOSIS — E11.621 TYPE 2 DIABETES MELLITUS WITH FOOT ULCER: ICD-10-CM

## 2024-10-13 DIAGNOSIS — Z94.0 KIDNEY TRANSPLANT STATUS: ICD-10-CM

## 2024-10-14 ENCOUNTER — OUTPATIENT (OUTPATIENT)
Dept: OUTPATIENT SERVICES | Facility: HOSPITAL | Age: 59
LOS: 1 days | End: 2024-10-14
Payer: MEDICARE

## 2024-10-14 DIAGNOSIS — Z89.421 ACQUIRED ABSENCE OF OTHER RIGHT TOE(S): Chronic | ICD-10-CM

## 2024-10-14 DIAGNOSIS — E11.621 TYPE 2 DIABETES MELLITUS WITH FOOT ULCER: ICD-10-CM

## 2024-10-14 DIAGNOSIS — N18.6 END STAGE RENAL DISEASE: Chronic | ICD-10-CM

## 2024-10-14 PROCEDURE — 93923 UPR/LXTR ART STDY 3+ LVLS: CPT | Mod: 26

## 2024-10-14 PROCEDURE — 93970 EXTREMITY STUDY: CPT | Mod: 26

## 2024-10-14 NOTE — ED PROVIDER NOTE - GASTROINTESTINAL NEGATIVE STATEMENT, MLM
Prior authorization approved per [facility] for Venlafaxine 225mg ER from 09/14/2024 to 10/14/2025.     no abdominal pain, no bloating, no constipation, no diarrhea, no nausea and no vomiting.

## 2024-10-16 ENCOUNTER — APPOINTMENT (OUTPATIENT)
Dept: WOUND CARE | Facility: HOSPITAL | Age: 59
End: 2024-10-16

## 2024-10-16 ENCOUNTER — OUTPATIENT (OUTPATIENT)
Dept: OUTPATIENT SERVICES | Facility: HOSPITAL | Age: 59
LOS: 1 days | Discharge: HOME | End: 2024-10-16
Payer: MEDICARE

## 2024-10-16 VITALS
RESPIRATION RATE: 18 BRPM | HEART RATE: 75 BPM | HEIGHT: 73 IN | SYSTOLIC BLOOD PRESSURE: 133 MMHG | TEMPERATURE: 97.8 F | OXYGEN SATURATION: 94 % | BODY MASS INDEX: 41.75 KG/M2 | DIASTOLIC BLOOD PRESSURE: 81 MMHG | WEIGHT: 315 LBS

## 2024-10-16 DIAGNOSIS — T81.89XD OTHER COMPLICATIONS OF PROCEDURES, NOT ELSEWHERE CLASSIFIED, SUBSEQUENT ENCOUNTER: ICD-10-CM

## 2024-10-16 DIAGNOSIS — Z94.0 KIDNEY TRANSPLANT STATUS: Chronic | ICD-10-CM

## 2024-10-16 DIAGNOSIS — Z89.421 ACQUIRED ABSENCE OF OTHER RIGHT TOE(S): Chronic | ICD-10-CM

## 2024-10-16 DIAGNOSIS — Z98.890 OTHER SPECIFIED POSTPROCEDURAL STATES: Chronic | ICD-10-CM

## 2024-10-16 DIAGNOSIS — N18.6 END STAGE RENAL DISEASE: Chronic | ICD-10-CM

## 2024-10-16 PROCEDURE — 99213 OFFICE O/P EST LOW 20 MIN: CPT

## 2024-10-21 ENCOUNTER — APPOINTMENT (OUTPATIENT)
Dept: CARDIOLOGY | Facility: CLINIC | Age: 59
End: 2024-10-21
Payer: MEDICARE

## 2024-10-21 ENCOUNTER — NON-APPOINTMENT (OUTPATIENT)
Age: 59
End: 2024-10-21

## 2024-10-21 VITALS — SYSTOLIC BLOOD PRESSURE: 120 MMHG | DIASTOLIC BLOOD PRESSURE: 70 MMHG

## 2024-10-21 VITALS
SYSTOLIC BLOOD PRESSURE: 148 MMHG | HEART RATE: 75 BPM | BODY MASS INDEX: 41.75 KG/M2 | WEIGHT: 315 LBS | DIASTOLIC BLOOD PRESSURE: 82 MMHG | HEIGHT: 73 IN | OXYGEN SATURATION: 92 %

## 2024-10-21 DIAGNOSIS — Z00.00 ENCOUNTER FOR GENERAL ADULT MEDICAL EXAMINATION W/OUT ABNORMAL FINDINGS: ICD-10-CM

## 2024-10-21 DIAGNOSIS — E78.5 HYPERLIPIDEMIA, UNSPECIFIED: ICD-10-CM

## 2024-10-21 DIAGNOSIS — I10 ESSENTIAL (PRIMARY) HYPERTENSION: ICD-10-CM

## 2024-10-21 DIAGNOSIS — I48.91 UNSPECIFIED ATRIAL FIBRILLATION: ICD-10-CM

## 2024-10-21 PROCEDURE — G2211 COMPLEX E/M VISIT ADD ON: CPT

## 2024-10-21 PROCEDURE — 99214 OFFICE O/P EST MOD 30 MIN: CPT

## 2024-10-21 PROCEDURE — 93000 ELECTROCARDIOGRAM COMPLETE: CPT

## 2024-10-22 LAB
25(OH)D3 SERPL-MCNC: 14.2 NG/ML
ALBUMIN SERPL ELPH-MCNC: 4 G/DL
ALP BLD-CCNC: 110 U/L
ALT SERPL-CCNC: 11 U/L
ANION GAP SERPL CALC-SCNC: 11 MMOL/L
AST SERPL-CCNC: 16 U/L
BILIRUB SERPL-MCNC: 0.5 MG/DL
BUN SERPL-MCNC: 26 MG/DL
CALCIUM SERPL-MCNC: 9.5 MG/DL
CHLORIDE SERPL-SCNC: 105 MMOL/L
CHOLEST SERPL-MCNC: 119 MG/DL
CO2 SERPL-SCNC: 27 MMOL/L
CREAT SERPL-MCNC: 1.34 MG/DL
EGFR: 62 ML/MIN/1.73M2
ESTIMATED AVERAGE GLUCOSE: 154 MG/DL
GLUCOSE SERPL-MCNC: 88 MG/DL
HBA1C MFR BLD HPLC: 7 %
HCT VFR BLD CALC: 43.3 %
HDLC SERPL-MCNC: 36 MG/DL
HGB BLD-MCNC: 13.4 G/DL
LDLC SERPL CALC-MCNC: 70 MG/DL
MAGNESIUM SERPL-MCNC: 2 MG/DL
MCHC RBC-ENTMCNC: 28.8 PG
MCHC RBC-ENTMCNC: 30.9 GM/DL
MCV RBC AUTO: 92.9 FL
NONHDLC SERPL-MCNC: 84 MG/DL
PLATELET # BLD AUTO: 250 K/UL
POTASSIUM SERPL-SCNC: 4.9 MMOL/L
PROT SERPL-MCNC: 6.7 G/DL
RBC # BLD: 4.66 M/UL
RBC # FLD: 14.7 %
SODIUM SERPL-SCNC: 143 MMOL/L
TRIGL SERPL-MCNC: 66 MG/DL
TSH SERPL-ACNC: 2.82 UIU/ML
WBC # FLD AUTO: 6.42 K/UL

## 2024-10-30 DIAGNOSIS — Z86.73 PERSONAL HISTORY OF TRANSIENT ISCHEMIC ATTACK (TIA), AND CEREBRAL INFARCTION WITHOUT RESIDUAL DEFICITS: ICD-10-CM

## 2024-10-30 DIAGNOSIS — E78.5 HYPERLIPIDEMIA, UNSPECIFIED: ICD-10-CM

## 2024-10-30 DIAGNOSIS — Z83.3 FAMILY HISTORY OF DIABETES MELLITUS: ICD-10-CM

## 2024-10-30 DIAGNOSIS — E11.610 TYPE 2 DIABETES MELLITUS WITH DIABETIC NEUROPATHIC ARTHROPATHY: ICD-10-CM

## 2024-10-30 DIAGNOSIS — I83.893 VARICOSE VEINS OF BILATERAL LOWER EXTREMITIES WITH OTHER COMPLICATIONS: ICD-10-CM

## 2024-10-30 DIAGNOSIS — Z94.0 KIDNEY TRANSPLANT STATUS: ICD-10-CM
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
## 2024-10-30 DIAGNOSIS — Z87.81 PERSONAL HISTORY OF (HEALED) TRAUMATIC FRACTURE: ICD-10-CM

## 2024-10-30 DIAGNOSIS — Z85.828 PERSONAL HISTORY OF OTHER MALIGNANT NEOPLASM OF SKIN: ICD-10-CM

## 2024-10-30 DIAGNOSIS — Z79.899 OTHER LONG TERM (CURRENT) DRUG THERAPY: ICD-10-CM
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
## 2024-10-30 DIAGNOSIS — Z79.01 LONG TERM (CURRENT) USE OF ANTICOAGULANTS: ICD-10-CM

## 2024-10-30 DIAGNOSIS — Z86.16 PERSONAL HISTORY OF COVID-19: ICD-10-CM

## 2024-10-30 DIAGNOSIS — G47.33 OBSTRUCTIVE SLEEP APNEA (ADULT) (PEDIATRIC): ICD-10-CM

## 2024-10-30 DIAGNOSIS — Z86.718 PERSONAL HISTORY OF OTHER VENOUS THROMBOSIS AND EMBOLISM: ICD-10-CM

## 2024-10-30 DIAGNOSIS — Z89.422 ACQUIRED ABSENCE OF OTHER LEFT TOE(S): ICD-10-CM

## 2024-10-30 DIAGNOSIS — Z89.421 ACQUIRED ABSENCE OF OTHER RIGHT TOE(S): ICD-10-CM

## 2024-10-30 DIAGNOSIS — Z80.3 FAMILY HISTORY OF MALIGNANT NEOPLASM OF BREAST: ICD-10-CM

## 2024-10-30 DIAGNOSIS — Z79.4 LONG TERM (CURRENT) USE OF INSULIN: ICD-10-CM

## 2024-10-30 DIAGNOSIS — T81.89XD OTHER COMPLICATIONS OF PROCEDURES, NOT ELSEWHERE CLASSIFIED, SUBSEQUENT ENCOUNTER: ICD-10-CM

## 2024-11-04 ENCOUNTER — APPOINTMENT (OUTPATIENT)
Dept: WOUND CARE | Facility: HOSPITAL | Age: 59
End: 2024-11-04
Payer: MEDICARE

## 2024-11-04 ENCOUNTER — OUTPATIENT (OUTPATIENT)
Dept: OUTPATIENT SERVICES | Facility: HOSPITAL | Age: 59
LOS: 1 days | Discharge: HOME | End: 2024-11-04
Payer: MEDICARE

## 2024-11-04 DIAGNOSIS — L97.412 NON-PRESSURE CHRONIC ULCER OF RIGHT HEEL AND MIDFOOT WITH FAT LAYER EXPOSED: ICD-10-CM

## 2024-11-04 DIAGNOSIS — Z98.890 OTHER SPECIFIED POSTPROCEDURAL STATES: Chronic | ICD-10-CM

## 2024-11-04 DIAGNOSIS — Z94.0 KIDNEY TRANSPLANT STATUS: Chronic | ICD-10-CM

## 2024-11-04 DIAGNOSIS — N18.6 END STAGE RENAL DISEASE: Chronic | ICD-10-CM

## 2024-11-04 DIAGNOSIS — L84 CORNS AND CALLOSITIES: ICD-10-CM

## 2024-11-04 DIAGNOSIS — Z89.421 ACQUIRED ABSENCE OF OTHER RIGHT TOE(S): Chronic | ICD-10-CM

## 2024-11-04 DIAGNOSIS — E11.621 TYPE 2 DIABETES MELLITUS WITH FOOT ULCER: ICD-10-CM

## 2024-11-04 DIAGNOSIS — L97.509 TYPE 2 DIABETES MELLITUS WITH FOOT ULCER: ICD-10-CM

## 2024-11-04 DIAGNOSIS — T81.89XD OTHER COMPLICATIONS OF PROCEDURES, NOT ELSEWHERE CLASSIFIED, SUBSEQUENT ENCOUNTER: ICD-10-CM

## 2024-11-04 PROCEDURE — 99213 OFFICE O/P EST LOW 20 MIN: CPT

## 2024-11-05 DIAGNOSIS — Z79.899 OTHER LONG TERM (CURRENT) DRUG THERAPY: ICD-10-CM

## 2024-11-05 DIAGNOSIS — E78.5 HYPERLIPIDEMIA, UNSPECIFIED: ICD-10-CM

## 2024-11-05 DIAGNOSIS — Z79.01 LONG TERM (CURRENT) USE OF ANTICOAGULANTS: ICD-10-CM

## 2024-11-05 DIAGNOSIS — Z86.718 PERSONAL HISTORY OF OTHER VENOUS THROMBOSIS AND EMBOLISM: ICD-10-CM

## 2024-11-05 DIAGNOSIS — E11.621 TYPE 2 DIABETES MELLITUS WITH FOOT ULCER: ICD-10-CM

## 2024-11-05 DIAGNOSIS — Z79.4 LONG TERM (CURRENT) USE OF INSULIN: ICD-10-CM

## 2024-11-05 DIAGNOSIS — T81.89XD OTHER COMPLICATIONS OF PROCEDURES, NOT ELSEWHERE CLASSIFIED, SUBSEQUENT ENCOUNTER: ICD-10-CM

## 2024-11-05 DIAGNOSIS — Z83.3 FAMILY HISTORY OF DIABETES MELLITUS: ICD-10-CM

## 2024-11-05 DIAGNOSIS — Z87.81 PERSONAL HISTORY OF (HEALED) TRAUMATIC FRACTURE: ICD-10-CM

## 2024-11-05 DIAGNOSIS — E11.610 TYPE 2 DIABETES MELLITUS WITH DIABETIC NEUROPATHIC ARTHROPATHY: ICD-10-CM

## 2024-11-05 DIAGNOSIS — Z80.3 FAMILY HISTORY OF MALIGNANT NEOPLASM OF BREAST: ICD-10-CM

## 2024-11-05 DIAGNOSIS — L84 CORNS AND CALLOSITIES: ICD-10-CM

## 2024-11-05 DIAGNOSIS — Z80.8 FAMILY HISTORY OF MALIGNANT NEOPLASM OF OTHER ORGANS OR SYSTEMS: ICD-10-CM

## 2024-11-05 DIAGNOSIS — Z94.0 KIDNEY TRANSPLANT STATUS: ICD-10-CM
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
## 2024-11-05 DIAGNOSIS — Z86.16 PERSONAL HISTORY OF COVID-19: ICD-10-CM

## 2024-11-05 DIAGNOSIS — Z86.73 PERSONAL HISTORY OF TRANSIENT ISCHEMIC ATTACK (TIA), AND CEREBRAL INFARCTION WITHOUT RESIDUAL DEFICITS: ICD-10-CM

## 2024-11-05 DIAGNOSIS — I83.893 VARICOSE VEINS OF BILATERAL LOWER EXTREMITIES WITH OTHER COMPLICATIONS: ICD-10-CM

## 2024-11-05 DIAGNOSIS — Z89.422 ACQUIRED ABSENCE OF OTHER LEFT TOE(S): ICD-10-CM

## 2024-11-05 DIAGNOSIS — G47.33 OBSTRUCTIVE SLEEP APNEA (ADULT) (PEDIATRIC): ICD-10-CM

## 2024-11-05 DIAGNOSIS — Z85.828 PERSONAL HISTORY OF OTHER MALIGNANT NEOPLASM OF SKIN: ICD-10-CM

## 2024-11-05 DIAGNOSIS — L97.412 NON-PRESSURE CHRONIC ULCER OF RIGHT HEEL AND MIDFOOT WITH FAT LAYER EXPOSED: ICD-10-CM
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
## 2024-11-05 DIAGNOSIS — Z89.421 ACQUIRED ABSENCE OF OTHER RIGHT TOE(S): ICD-10-CM

## 2024-11-20 PROCEDURE — G0463: CPT

## 2024-11-20 PROCEDURE — 93923 UPR/LXTR ART STDY 3+ LVLS: CPT

## 2024-11-20 PROCEDURE — 93970 EXTREMITY STUDY: CPT

## 2024-12-02 ENCOUNTER — LABORATORY RESULT (OUTPATIENT)
Age: 59
End: 2024-12-02

## 2024-12-02 ENCOUNTER — APPOINTMENT (OUTPATIENT)
Dept: WOUND CARE | Facility: HOSPITAL | Age: 59
End: 2024-12-02

## 2024-12-02 ENCOUNTER — APPOINTMENT (OUTPATIENT)
Dept: NEPHROLOGY | Facility: CLINIC | Age: 59
End: 2024-12-02
Payer: MEDICARE

## 2024-12-02 VITALS
HEIGHT: 73 IN | HEART RATE: 82 BPM | WEIGHT: 315 LBS | TEMPERATURE: 98.6 F | RESPIRATION RATE: 18 BRPM | SYSTOLIC BLOOD PRESSURE: 120 MMHG | OXYGEN SATURATION: 96 % | DIASTOLIC BLOOD PRESSURE: 63 MMHG | BODY MASS INDEX: 41.75 KG/M2

## 2024-12-02 DIAGNOSIS — Z79.60 LONG TERM (CURRENT) USE OF UNSPECIFIED IMMUNOMODULATORS AND IMMUNOSUPPRESSANTS: ICD-10-CM

## 2024-12-02 DIAGNOSIS — Z94.0 KIDNEY TRANSPLANT STATUS: ICD-10-CM

## 2024-12-02 DIAGNOSIS — I10 ESSENTIAL (PRIMARY) HYPERTENSION: ICD-10-CM

## 2024-12-02 DIAGNOSIS — E11.9 TYPE 2 DIABETES MELLITUS W/OUT COMPLICATIONS: ICD-10-CM

## 2024-12-02 LAB
ALBUMIN SERPL ELPH-MCNC: 4.1 G/DL
ALP BLD-CCNC: 110 U/L
ALT SERPL-CCNC: 11 U/L
ANION GAP SERPL CALC-SCNC: 9 MMOL/L
APPEARANCE: CLEAR
AST SERPL-CCNC: 17 U/L
BILIRUB SERPL-MCNC: 0.5 MG/DL
BILIRUBIN URINE: NEGATIVE
BLOOD URINE: NEGATIVE
BUN SERPL-MCNC: 25 MG/DL
CALCIUM SERPL-MCNC: 9.3 MG/DL
CHLORIDE SERPL-SCNC: 104 MMOL/L
CHOLEST SERPL-MCNC: 139 MG/DL
CO2 SERPL-SCNC: 27 MMOL/L
COLOR: YELLOW
CREAT SERPL-MCNC: 1.25 MG/DL
CREAT SPEC-SCNC: 108 MG/DL
CREAT SPEC-SCNC: 108 MG/DL
CREAT/PROT UR: 0.2 RATIO
EGFR: 67 ML/MIN/1.73M2
ESTIMATED AVERAGE GLUCOSE: 148 MG/DL
GLUCOSE QUALITATIVE U: NEGATIVE MG/DL
GLUCOSE SERPL-MCNC: 134 MG/DL
HBA1C MFR BLD HPLC: 6.8 %
HCT VFR BLD CALC: 42 %
HDLC SERPL-MCNC: 43 MG/DL
HGB BLD-MCNC: 13.5 G/DL
KETONES URINE: ABNORMAL MG/DL
LDH SERPL-CCNC: 194 U/L
LDLC SERPL CALC-MCNC: 82 MG/DL
LEUKOCYTE ESTERASE URINE: NEGATIVE
MAGNESIUM SERPL-MCNC: 1.9 MG/DL
MCHC RBC-ENTMCNC: 29.7 PG
MCHC RBC-ENTMCNC: 32.1 G/DL
MCV RBC AUTO: 92.5 FL
MICROALBUMIN 24H UR DL<=1MG/L-MCNC: 10 MG/DL
MICROALBUMIN/CREAT 24H UR-RTO: 93 MG/G
NITRITE URINE: NEGATIVE
NONHDLC SERPL-MCNC: 96 MG/DL
PH URINE: 5.5
PHOSPHATE SERPL-MCNC: 3.6 MG/DL
PLATELET # BLD AUTO: 261 K/UL
POTASSIUM SERPL-SCNC: 5.2 MMOL/L
PROT SERPL-MCNC: 6.8 G/DL
PROT UR-MCNC: 24 MG/DL
PROTEIN URINE: 30 MG/DL
RBC # BLD: 4.54 M/UL
RBC # FLD: 14.6 %
SODIUM SERPL-SCNC: 141 MMOL/L
SPECIFIC GRAVITY URINE: 1.02
TACROLIMUS SERPL-MCNC: 5.8 NG/ML
TRIGL SERPL-MCNC: 69 MG/DL
URATE SERPL-MCNC: 5.6 MG/DL
UROBILINOGEN URINE: 1 MG/DL
WBC # FLD AUTO: 7.45 K/UL

## 2024-12-02 PROCEDURE — 99214 OFFICE O/P EST MOD 30 MIN: CPT

## 2024-12-06 ENCOUNTER — RX RENEWAL (OUTPATIENT)
Age: 59
End: 2024-12-06

## 2024-12-11 ENCOUNTER — APPOINTMENT (OUTPATIENT)
Dept: WOUND CARE | Facility: HOSPITAL | Age: 59
End: 2024-12-11
Payer: MEDICARE

## 2024-12-11 ENCOUNTER — OUTPATIENT (OUTPATIENT)
Dept: OUTPATIENT SERVICES | Facility: HOSPITAL | Age: 59
LOS: 1 days | Discharge: HOME | End: 2024-12-11
Payer: MEDICARE

## 2024-12-11 VITALS
WEIGHT: 315 LBS | HEIGHT: 73 IN | OXYGEN SATURATION: 97 % | HEART RATE: 80 BPM | RESPIRATION RATE: 18 BRPM | BODY MASS INDEX: 41.75 KG/M2 | DIASTOLIC BLOOD PRESSURE: 60 MMHG | TEMPERATURE: 98.6 F | SYSTOLIC BLOOD PRESSURE: 121 MMHG

## 2024-12-11 DIAGNOSIS — N18.6 END STAGE RENAL DISEASE: Chronic | ICD-10-CM

## 2024-12-11 DIAGNOSIS — L97.509 TYPE 2 DIABETES MELLITUS WITH FOOT ULCER: ICD-10-CM

## 2024-12-11 DIAGNOSIS — T81.89XD OTHER COMPLICATIONS OF PROCEDURES, NOT ELSEWHERE CLASSIFIED, SUBSEQUENT ENCOUNTER: ICD-10-CM

## 2024-12-11 DIAGNOSIS — L84 CORNS AND CALLOSITIES: ICD-10-CM

## 2024-12-11 DIAGNOSIS — L97.412 NON-PRESSURE CHRONIC ULCER OF RIGHT HEEL AND MIDFOOT WITH FAT LAYER EXPOSED: ICD-10-CM

## 2024-12-11 DIAGNOSIS — E11.621 TYPE 2 DIABETES MELLITUS WITH FOOT ULCER: ICD-10-CM

## 2024-12-11 DIAGNOSIS — Z98.890 OTHER SPECIFIED POSTPROCEDURAL STATES: Chronic | ICD-10-CM

## 2024-12-11 DIAGNOSIS — Z94.0 KIDNEY TRANSPLANT STATUS: Chronic | ICD-10-CM

## 2024-12-11 DIAGNOSIS — Z89.421 ACQUIRED ABSENCE OF OTHER RIGHT TOE(S): Chronic | ICD-10-CM

## 2024-12-11 PROCEDURE — 99213 OFFICE O/P EST LOW 20 MIN: CPT

## 2024-12-11 PROCEDURE — G0463: CPT

## 2024-12-12 DIAGNOSIS — Z85.828 PERSONAL HISTORY OF OTHER MALIGNANT NEOPLASM OF SKIN: ICD-10-CM

## 2024-12-12 DIAGNOSIS — Z89.421 ACQUIRED ABSENCE OF OTHER RIGHT TOE(S): ICD-10-CM

## 2024-12-12 DIAGNOSIS — Z94.0 KIDNEY TRANSPLANT STATUS: ICD-10-CM
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
## 2024-12-12 DIAGNOSIS — L84 CORNS AND CALLOSITIES: ICD-10-CM

## 2024-12-12 DIAGNOSIS — Z86.73 PERSONAL HISTORY OF TRANSIENT ISCHEMIC ATTACK (TIA), AND CEREBRAL INFARCTION WITHOUT RESIDUAL DEFICITS: ICD-10-CM

## 2024-12-12 DIAGNOSIS — Z80.3 FAMILY HISTORY OF MALIGNANT NEOPLASM OF BREAST: ICD-10-CM

## 2024-12-12 DIAGNOSIS — Z86.16 PERSONAL HISTORY OF COVID-19: ICD-10-CM

## 2024-12-12 DIAGNOSIS — I83.893 VARICOSE VEINS OF BILATERAL LOWER EXTREMITIES WITH OTHER COMPLICATIONS: ICD-10-CM

## 2024-12-12 DIAGNOSIS — E78.5 HYPERLIPIDEMIA, UNSPECIFIED: ICD-10-CM

## 2024-12-12 DIAGNOSIS — E11.621 TYPE 2 DIABETES MELLITUS WITH FOOT ULCER: ICD-10-CM

## 2024-12-12 DIAGNOSIS — Z89.422 ACQUIRED ABSENCE OF OTHER LEFT TOE(S): ICD-10-CM

## 2024-12-12 DIAGNOSIS — Z79.899 OTHER LONG TERM (CURRENT) DRUG THERAPY: ICD-10-CM

## 2024-12-12 DIAGNOSIS — G47.33 OBSTRUCTIVE SLEEP APNEA (ADULT) (PEDIATRIC): ICD-10-CM

## 2024-12-12 DIAGNOSIS — Z87.81 PERSONAL HISTORY OF (HEALED) TRAUMATIC FRACTURE: ICD-10-CM
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
## 2024-12-12 DIAGNOSIS — Z79.4 LONG TERM (CURRENT) USE OF INSULIN: ICD-10-CM

## 2024-12-12 DIAGNOSIS — E11.610 TYPE 2 DIABETES MELLITUS WITH DIABETIC NEUROPATHIC ARTHROPATHY: ICD-10-CM

## 2024-12-12 DIAGNOSIS — L97.412 NON-PRESSURE CHRONIC ULCER OF RIGHT HEEL AND MIDFOOT WITH FAT LAYER EXPOSED: ICD-10-CM

## 2024-12-12 DIAGNOSIS — T81.89XD OTHER COMPLICATIONS OF PROCEDURES, NOT ELSEWHERE CLASSIFIED, SUBSEQUENT ENCOUNTER: ICD-10-CM

## 2024-12-12 DIAGNOSIS — Z80.8 FAMILY HISTORY OF MALIGNANT NEOPLASM OF OTHER ORGANS OR SYSTEMS: ICD-10-CM

## 2024-12-12 DIAGNOSIS — Z86.718 PERSONAL HISTORY OF OTHER VENOUS THROMBOSIS AND EMBOLISM: ICD-10-CM

## 2024-12-12 DIAGNOSIS — Z83.3 FAMILY HISTORY OF DIABETES MELLITUS: ICD-10-CM

## 2024-12-12 DIAGNOSIS — Z79.01 LONG TERM (CURRENT) USE OF ANTICOAGULANTS: ICD-10-CM

## 2024-12-16 ENCOUNTER — APPOINTMENT (OUTPATIENT)
Dept: PODIATRY | Facility: CLINIC | Age: 59
End: 2024-12-16

## 2024-12-16 VITALS
HEIGHT: 73 IN | TEMPERATURE: 97.2 F | BODY MASS INDEX: 41.75 KG/M2 | SYSTOLIC BLOOD PRESSURE: 129 MMHG | DIASTOLIC BLOOD PRESSURE: 68 MMHG | OXYGEN SATURATION: 93 % | HEART RATE: 87 BPM | WEIGHT: 315 LBS

## 2024-12-16 PROCEDURE — 11720 DEBRIDE NAIL 1-5: CPT

## 2024-12-31 ENCOUNTER — RX RENEWAL (OUTPATIENT)
Age: 59
End: 2024-12-31

## 2025-01-02 NOTE — PLAN
"Chief Complaint   Patient presents with    Sleep Study     History of sleep apnea    Medication Follow-up     Medication management, refill of Vyvanse       Initial /72 (BP Location: Right arm, Patient Position: Sitting, Cuff Size: Adult Regular)   Pulse 71   Temp 97.3  F (36.3  C) (Tympanic)   Resp 18   Ht 1.626 m (5' 4\")   Wt 75.4 kg (166 lb 4 oz)   SpO2 96%   BMI 28.54 kg/m   Estimated body mass index is 28.54 kg/m  as calculated from the following:    Height as of this encounter: 1.626 m (5' 4\").    Weight as of this encounter: 75.4 kg (166 lb 4 oz).  Medication Review: complete    The next two questions are to help us understand your food security.  If you are feeling you need any assistance in this area, we have resources available to support you today.           No data to display                  Health Care Directive:  Patient does not have a Health Care Directive: Discussed advance care planning with patient; however, patient declined at this time.    Maxine Conner LPN      "
[FreeTextEntry1] : Explained at length with patient and wife all of the patient co morbid factors . Patient has been diabetic for over 30 years has had kidney transplant , morbid obesity , diabetic Charcot foot . Patient is advanced immunocompromised state and diabetic pathology is continuing and worsening . Patient and wife understand . Patient high risk for limb loss and life threatening sepsis .Continue wound care , off loading , . peripheral callus shaved with # 15 blade , ulcers cauterized with silver alginate . Patient sent for vascular studies . PTR one week  Spent 20 minutes for patient care and medical decision making.\par

## 2025-01-02 NOTE — REVIEW OF SYSTEMS
Addendum  created 01/02/25 0950 by Mark Matos MD    Intraprocedure Event edited, Intraprocedure Staff edited       [FreeTextEntry1] : 0937hr [Fever] : no fever [Chills] : no chills [Eye Pain] : no eye pain [Loss Of Hearing] : no hearing loss [Shortness Of Breath] : no shortness of breath [Abdominal Pain] : no abdominal pain [Vomiting] : no vomiting [Skin Lesions] : no skin lesions [Skin Wound] : no skin wound [Anxiety] : no anxiety [Easy Bleeding] : no tendency for easy bleeding [FreeTextEntry5] : HTN,HLD [FreeTextEntry7] : 40.9 BMI , morbid obesity  [FreeTextEntry8] : Kidney Transplant  [FreeTextEntry9] : Associated Diabetic Charcot foot  [de-identified] : plantar right foot midfoot , lateral  DFU 2  , large area of peripheral callus build up , the ulcer is clean and granular s/p surgical intervention , sutures intact - soi  [de-identified] : IDDM with neuropathy  [de-identified] : MARILUZ

## 2025-01-06 ENCOUNTER — RX RENEWAL (OUTPATIENT)
Age: 60
End: 2025-01-06

## 2025-01-23 NOTE — PHYSICAL THERAPY INITIAL EVALUATION ADULT - STANDING BALANCE: DYNAMIC, REHAB EVAL
Problem: Respiratory Impairment - Respiratory Therapy 253  Intervention: Inhaled gas administration  Note: Intervention Status  Done  Intervention: Respiratory support devices  Note: Intervention Status  Done      good balance

## 2025-02-17 ENCOUNTER — NON-APPOINTMENT (OUTPATIENT)
Age: 60
End: 2025-02-17

## 2025-02-17 ENCOUNTER — APPOINTMENT (OUTPATIENT)
Dept: COLORECTAL SURGERY | Facility: CLINIC | Age: 60
End: 2025-02-17
Payer: COMMERCIAL

## 2025-02-17 VITALS
HEIGHT: 73 IN | SYSTOLIC BLOOD PRESSURE: 113 MMHG | TEMPERATURE: 97.6 F | WEIGHT: 315 LBS | OXYGEN SATURATION: 96 % | RESPIRATION RATE: 18 BRPM | BODY MASS INDEX: 41.75 KG/M2 | DIASTOLIC BLOOD PRESSURE: 84 MMHG | HEART RATE: 69 BPM

## 2025-02-17 DIAGNOSIS — Z12.11 ENCOUNTER FOR SCREENING FOR MALIGNANT NEOPLASM OF COLON: ICD-10-CM

## 2025-02-17 DIAGNOSIS — Z12.12 ENCOUNTER FOR SCREENING FOR MALIGNANT NEOPLASM OF COLON: ICD-10-CM

## 2025-02-17 PROCEDURE — 99204 OFFICE O/P NEW MOD 45 MIN: CPT

## 2025-02-18 ENCOUNTER — APPOINTMENT (OUTPATIENT)
Dept: DERMATOLOGY | Facility: CLINIC | Age: 60
End: 2025-02-18
Payer: COMMERCIAL

## 2025-02-18 DIAGNOSIS — D48.5 NEOPLASM OF UNCERTAIN BEHAVIOR OF SKIN: ICD-10-CM

## 2025-02-18 DIAGNOSIS — L57.0 ACTINIC KERATOSIS: ICD-10-CM

## 2025-02-18 DIAGNOSIS — L82.0 INFLAMED SEBORRHEIC KERATOSIS: ICD-10-CM

## 2025-02-18 DIAGNOSIS — Z12.83 ENCOUNTER FOR SCREENING FOR MALIGNANT NEOPLASM OF SKIN: ICD-10-CM

## 2025-02-18 DIAGNOSIS — D22.9 MELANOCYTIC NEVI, UNSPECIFIED: ICD-10-CM

## 2025-02-18 PROCEDURE — 11102 TANGNTL BX SKIN SINGLE LES: CPT | Mod: 59

## 2025-02-18 PROCEDURE — 17003 DESTRUCT PREMALG LES 2-14: CPT | Mod: 59

## 2025-02-18 PROCEDURE — 17000 DESTRUCT PREMALG LESION: CPT | Mod: 59

## 2025-02-18 PROCEDURE — 99213 OFFICE O/P EST LOW 20 MIN: CPT | Mod: 25

## 2025-02-21 LAB — DERMATOLOGY BIOPSY: NORMAL

## 2025-02-25 ENCOUNTER — NON-APPOINTMENT (OUTPATIENT)
Age: 60
End: 2025-02-25

## 2025-02-25 ENCOUNTER — APPOINTMENT (OUTPATIENT)
Dept: CARDIOLOGY | Facility: CLINIC | Age: 60
End: 2025-02-25
Payer: MEDICARE

## 2025-02-25 VITALS
SYSTOLIC BLOOD PRESSURE: 135 MMHG | HEIGHT: 73 IN | HEART RATE: 81 BPM | OXYGEN SATURATION: 95 % | WEIGHT: 315 LBS | DIASTOLIC BLOOD PRESSURE: 70 MMHG | BODY MASS INDEX: 41.75 KG/M2

## 2025-02-25 DIAGNOSIS — I48.91 UNSPECIFIED ATRIAL FIBRILLATION: ICD-10-CM

## 2025-02-25 PROCEDURE — G2211 COMPLEX E/M VISIT ADD ON: CPT

## 2025-02-25 PROCEDURE — 93000 ELECTROCARDIOGRAM COMPLETE: CPT

## 2025-02-25 PROCEDURE — 99214 OFFICE O/P EST MOD 30 MIN: CPT

## 2025-02-27 NOTE — REVIEW OF SYSTEMS
DISPLAY PLAN FREE TEXT [Fever] : no fever [Chills] : no chills [Eye Pain] : no eye pain [Red Eyes] : eyes not red [Earache] : no earache [Nosebleeds] : no nosebleeds [Chest Pain] : no chest pain [Shortness Of Breath] : no shortness of breath [Cough] : no cough [Abdominal Pain] : no abdominal pain [Vomiting] : no vomiting [Diarrhea] : no diarrhea [Joint Swelling] : no joint swelling [Limb Pain] : no limb pain [Skin Lesions] : skin lesion [Skin Wound] : no skin wound [Anxiety] : no anxiety [Easy Bleeding] : no tendency for easy bleeding [FreeTextEntry5] : HTN,HLD [FreeTextEntry7] : 40.9 BMI , morbid obesity  [FreeTextEntry8] : Kidney Transplant  [FreeTextEntry9] : Associated Right Diabetic Charcot foot status post right Achilles tendon lengthening, right posterior tibial tendon lengthening, right peroneus brevis tendon detachment and reattachment with exostectomy of the right fifth metatarsal base and plantar cuboid, and right fifth digit proximal phalanx base resection [de-identified] : Right plantar midfoot ulcer down to fat ,right lateral surgical site with sutures intact , no dehiscence     erbdvv gkl              ] [de-identified] : IDDM with neuropathy  [de-identified] : MARILUZ
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
## 2025-03-18 ENCOUNTER — RX RENEWAL (OUTPATIENT)
Age: 60
End: 2025-03-18

## 2025-03-31 ENCOUNTER — APPOINTMENT (OUTPATIENT)
Dept: PODIATRY | Facility: CLINIC | Age: 60
End: 2025-03-31

## 2025-03-31 VITALS
BODY MASS INDEX: 41.75 KG/M2 | HEIGHT: 73 IN | TEMPERATURE: 98.1 F | DIASTOLIC BLOOD PRESSURE: 63 MMHG | SYSTOLIC BLOOD PRESSURE: 98 MMHG | OXYGEN SATURATION: 93 % | HEART RATE: 70 BPM | WEIGHT: 315 LBS

## 2025-03-31 PROCEDURE — 11720 DEBRIDE NAIL 1-5: CPT

## 2025-04-03 NOTE — ADDENDUM
Patient's daughter Tatyana called regarding a certification needed by the patient's home oxygen supplier. Please call back Tatyana to discuss.   [FreeTextEntry1] : The pt arrived to [facility] ambulatory and A&Ox3 for scheduled HBOT.\par The pt was provided with lower extremity off-loading/elevation measure to comfort prior to HBOT.\par The pt's insulin port was covered with ABD/paper tape prior to HBOT.\par The pt received wound care prior to HBOT.\par \par The pt's pre-dive screening found ineligible BGL to begin HBOT.\par CHRN notified. \par The pt was provided with 15 g glucose via x2 Apple Juice and allowed 15 min. for metabolization of same; \par The pt's BGL was re-tested and found to be outside of acceptable limits.\par CHRN Notified.\par The pt was provided with 15g glucose via x4 glucose tablets and allowed 15 min. for metabolization of same.  \par The pt's BGL waa re-tested and found to be within acceptable limits to begin HBOT. \par \par The pt's pre-dive screening was found to be within acceptable limits to begin HBOT.\par The pt descended @ 2.2 PSI/min to Rx'd tx depth of 2.4 DEEPAK in chamber # 2 without incident.\par The pt was observed with visible chest motion and without incident for the duration of HBOT. \par The pt was administered and received intermittent med. air over course of HBOT without incident.\par The pt ascended frm depth to surface without incident.\par The pt tolerated HBOT without incident.\par The pt received ACE wrap post-HBOT.\par \par The pt's post-HBOT BP was elevated.\par CHRN and MD notified.\par Pt was allowed period of rest; then BP was re-tested. \par The pt's BP remained approximately the same as previously recorded. \par The pt was asymptomatic for HTN. \par The pt states it is time for his BID BP medication.\par The pt was released from [facility] in stable condition x MD with instruction to take BP medication upon arrival at home. \par The pt agreed to same.

## 2025-04-05 NOTE — CONSULT NOTE ADULT - PROBLEM SELECTOR PROBLEM 1
T1DM (type 1 diabetes mellitus)
Open wound of plantar aspect of right foot
T1DM (type 1 diabetes mellitus)
T1DM (type 1 diabetes mellitus)
(0) No drift; limb holds 90 (or 45) degrees for full 10 secs

## 2025-04-25 NOTE — DISCHARGE NOTE PROVIDER - ATTENDING ATTESTATION STATEMENT
Infusion# 1    Recent labs? Reviewed    Premeds? None    S/S of current or recent infections in the past 14 days? None/Denies    Recent Surgery/invasive procedures? None/Denies     Any recent vaccines ? None/Denies    Feraheme 1020 mg administered IV at a 30 minute rate per orders; see MAR and vitals for more  Details.        
I have personally seen and examined the patient. I have collaborated with and supervised the

## 2025-04-28 ENCOUNTER — NON-APPOINTMENT (OUTPATIENT)
Age: 60
End: 2025-04-28

## 2025-04-28 ENCOUNTER — APPOINTMENT (OUTPATIENT)
Dept: DERMATOLOGY | Facility: CLINIC | Age: 60
End: 2025-04-28
Payer: MEDICARE

## 2025-04-28 PROCEDURE — 17313 MOHS 1 STAGE T/A/L: CPT

## 2025-04-28 PROCEDURE — 12032 INTMD RPR S/A/T/EXT 2.6-7.5: CPT

## 2025-04-29 ENCOUNTER — OUTPATIENT (OUTPATIENT)
Dept: OUTPATIENT SERVICES | Facility: HOSPITAL | Age: 60
LOS: 1 days | End: 2025-04-29
Payer: COMMERCIAL

## 2025-04-29 VITALS
HEIGHT: 73 IN | OXYGEN SATURATION: 98 % | RESPIRATION RATE: 16 BRPM | DIASTOLIC BLOOD PRESSURE: 69 MMHG | WEIGHT: 315 LBS | SYSTOLIC BLOOD PRESSURE: 114 MMHG | HEART RATE: 74 BPM | TEMPERATURE: 98 F

## 2025-04-29 DIAGNOSIS — N18.6 END STAGE RENAL DISEASE: Chronic | ICD-10-CM

## 2025-04-29 DIAGNOSIS — Z12.11 ENCOUNTER FOR SCREENING FOR MALIGNANT NEOPLASM OF COLON: ICD-10-CM

## 2025-04-29 DIAGNOSIS — Z94.0 KIDNEY TRANSPLANT STATUS: Chronic | ICD-10-CM

## 2025-04-29 DIAGNOSIS — Z98.890 OTHER SPECIFIED POSTPROCEDURAL STATES: Chronic | ICD-10-CM

## 2025-04-29 DIAGNOSIS — Z89.421 ACQUIRED ABSENCE OF OTHER RIGHT TOE(S): Chronic | ICD-10-CM

## 2025-04-29 LAB
A1C WITH ESTIMATED AVERAGE GLUCOSE RESULT: 7.3 % — HIGH (ref 4–5.6)
ALBUMIN SERPL ELPH-MCNC: 3.4 G/DL — SIGNIFICANT CHANGE UP (ref 3.3–5)
ALP SERPL-CCNC: 99 U/L — SIGNIFICANT CHANGE UP (ref 40–120)
ALT FLD-CCNC: 21 U/L — SIGNIFICANT CHANGE UP (ref 12–78)
ANION GAP SERPL CALC-SCNC: 3 MMOL/L — LOW (ref 5–17)
APTT BLD: 34.6 SEC — SIGNIFICANT CHANGE UP (ref 26.1–36.8)
AST SERPL-CCNC: 9 U/L — LOW (ref 15–37)
BASOPHILS # BLD AUTO: 0.05 K/UL — SIGNIFICANT CHANGE UP (ref 0–0.2)
BASOPHILS NFR BLD AUTO: 0.7 % — SIGNIFICANT CHANGE UP (ref 0–2)
BILIRUB SERPL-MCNC: 0.6 MG/DL — SIGNIFICANT CHANGE UP (ref 0.2–1.2)
BUN SERPL-MCNC: 22 MG/DL — SIGNIFICANT CHANGE UP (ref 7–23)
CALCIUM SERPL-MCNC: 9.4 MG/DL — SIGNIFICANT CHANGE UP (ref 8.5–10.1)
CHLORIDE SERPL-SCNC: 108 MMOL/L — SIGNIFICANT CHANGE UP (ref 96–108)
CO2 SERPL-SCNC: 29 MMOL/L — SIGNIFICANT CHANGE UP (ref 22–31)
CREAT SERPL-MCNC: 1.15 MG/DL — SIGNIFICANT CHANGE UP (ref 0.5–1.3)
EGFR: 74 ML/MIN/1.73M2 — SIGNIFICANT CHANGE UP
EGFR: 74 ML/MIN/1.73M2 — SIGNIFICANT CHANGE UP
EOSINOPHIL # BLD AUTO: 0.27 K/UL — SIGNIFICANT CHANGE UP (ref 0–0.5)
EOSINOPHIL NFR BLD AUTO: 3.7 % — SIGNIFICANT CHANGE UP (ref 0–6)
ESTIMATED AVERAGE GLUCOSE: 163 MG/DL — HIGH (ref 68–114)
GLUCOSE SERPL-MCNC: 150 MG/DL — HIGH (ref 70–99)
HCT VFR BLD CALC: 42.1 % — SIGNIFICANT CHANGE UP (ref 39–50)
HGB BLD-MCNC: 13.8 G/DL — SIGNIFICANT CHANGE UP (ref 13–17)
IMM GRANULOCYTES # BLD AUTO: 0.03 K/UL — SIGNIFICANT CHANGE UP (ref 0–0.07)
IMM GRANULOCYTES NFR BLD AUTO: 0.4 % — SIGNIFICANT CHANGE UP (ref 0–0.9)
INR BLD: 1.31 RATIO — HIGH (ref 0.85–1.16)
LYMPHOCYTES # BLD AUTO: 0.99 K/UL — LOW (ref 1–3.3)
LYMPHOCYTES NFR BLD AUTO: 13.6 % — SIGNIFICANT CHANGE UP (ref 13–44)
MCHC RBC-ENTMCNC: 29.8 PG — SIGNIFICANT CHANGE UP (ref 27–34)
MCHC RBC-ENTMCNC: 32.8 G/DL — SIGNIFICANT CHANGE UP (ref 32–36)
MCV RBC AUTO: 90.9 FL — SIGNIFICANT CHANGE UP (ref 80–100)
MONOCYTES # BLD AUTO: 0.51 K/UL — SIGNIFICANT CHANGE UP (ref 0–0.9)
MONOCYTES NFR BLD AUTO: 7 % — SIGNIFICANT CHANGE UP (ref 2–14)
NEUTROPHILS # BLD AUTO: 5.45 K/UL — SIGNIFICANT CHANGE UP (ref 1.8–7.4)
NEUTROPHILS NFR BLD AUTO: 74.6 % — SIGNIFICANT CHANGE UP (ref 43–77)
NRBC # BLD AUTO: 0 K/UL — SIGNIFICANT CHANGE UP (ref 0–0)
NRBC # FLD: 0 K/UL — SIGNIFICANT CHANGE UP (ref 0–0)
NRBC BLD AUTO-RTO: 0 /100 WBCS — SIGNIFICANT CHANGE UP (ref 0–0)
PLATELET # BLD AUTO: 202 K/UL — SIGNIFICANT CHANGE UP (ref 150–400)
PMV BLD: 11 FL — SIGNIFICANT CHANGE UP (ref 7–13)
POTASSIUM SERPL-MCNC: 4.4 MMOL/L — SIGNIFICANT CHANGE UP (ref 3.5–5.3)
POTASSIUM SERPL-SCNC: 4.4 MMOL/L — SIGNIFICANT CHANGE UP (ref 3.5–5.3)
PROT SERPL-MCNC: 7.3 GM/DL — SIGNIFICANT CHANGE UP (ref 6–8.3)
PROTHROM AB SERPL-ACNC: 15 SEC — HIGH (ref 9.9–13.4)
RBC # BLD: 4.63 M/UL — SIGNIFICANT CHANGE UP (ref 4.2–5.8)
RBC # FLD: 14.2 % — SIGNIFICANT CHANGE UP (ref 10.3–14.5)
SODIUM SERPL-SCNC: 140 MMOL/L — SIGNIFICANT CHANGE UP (ref 135–145)
WBC # BLD: 7.3 K/UL — SIGNIFICANT CHANGE UP (ref 3.8–10.5)
WBC # FLD AUTO: 7.3 K/UL — SIGNIFICANT CHANGE UP (ref 3.8–10.5)

## 2025-04-29 PROCEDURE — 80053 COMPREHEN METABOLIC PANEL: CPT

## 2025-04-29 PROCEDURE — 86901 BLOOD TYPING SEROLOGIC RH(D): CPT

## 2025-04-29 PROCEDURE — 85025 COMPLETE CBC W/AUTO DIFF WBC: CPT

## 2025-04-29 PROCEDURE — 93010 ELECTROCARDIOGRAM REPORT: CPT

## 2025-04-29 PROCEDURE — 93005 ELECTROCARDIOGRAM TRACING: CPT

## 2025-04-29 PROCEDURE — 86900 BLOOD TYPING SEROLOGIC ABO: CPT

## 2025-04-29 PROCEDURE — 36415 COLL VENOUS BLD VENIPUNCTURE: CPT

## 2025-04-29 PROCEDURE — 99214 OFFICE O/P EST MOD 30 MIN: CPT | Mod: 25

## 2025-04-29 PROCEDURE — 83036 HEMOGLOBIN GLYCOSYLATED A1C: CPT

## 2025-04-29 PROCEDURE — 85730 THROMBOPLASTIN TIME PARTIAL: CPT

## 2025-04-29 PROCEDURE — 86850 RBC ANTIBODY SCREEN: CPT

## 2025-04-29 PROCEDURE — 85610 PROTHROMBIN TIME: CPT

## 2025-04-29 NOTE — H&P PST ADULT - NSICDXFAMILYHX_GEN_ALL_CORE_FT
FAMILY HISTORY:  Family history of diabetes mellitus (DM)  FH: skin cancer    Mother  Still living? Unknown  FHx: breast cancer, Age at diagnosis: Age Unknown  FHx: pancreatic cancer, Age at diagnosis: Age Unknown

## 2025-04-29 NOTE — H&P PST ADULT - NSICDXPASTMEDICALHX_GEN_ALL_CORE_FT
PAST MEDICAL HISTORY:  Afib     CKD (chronic kidney disease) Renal Transplant 2013 at [facility]    Colon cancer screening     CVA (cerebral vascular accident) [place] Stroke  low L body sensation  low R face sensation  decreased peripheral vision  poor balance  2015      Diabetic peripheral neuropathy     History of DVT (deep vein thrombosis) LLE 2016, was on Eliquis x 6 months  Was hospitalized for Rhinovirus at the time, intubated    History of pleural effusion right thoracentesis 6/2022 and 8/2022    History of restrictive lung disease     Hyperlipidemia     Hypertension     Obesity (BMI 30-39.9)     CARMEN treated with BiPAP 2L O2 at night    Recurrent squamous cell carcinoma of skin nose, L arm    Squamous cell carcinoma of skin of left ear nose    Toe infection     Type 1 diabetes

## 2025-04-29 NOTE — H&P PST ADULT - ENDOCRINE COMMENTS
hx of Diabetes type 1 on with insulin pump and self monitoring device flakita  followed by endocrinologist

## 2025-04-29 NOTE — H&P PST ADULT - PROBLEM SELECTOR PLAN 1
1. Expect a call from Endoscopy the day before your procedure between 11am and 3pm.  2. Follow the GI doctor's instructions for preparation  and day before procedure activities.  3. Follow the GI doctor's  instructions for medications.   4. diabetes type 1 endocrinologist consult requested , insulin pump self assessment completed in PST, endo- Lucia         made aware, CARMEN precautions.  OR booking notified.   5. PMD and cardiologist consult pending requested by surgeon   6. cbc, bmp, type and screen, ptt/inr, a1c,  done today in PST 1. Expect a call from Endoscopy the day before your procedure between 11am and 3pm.  2. Follow the GI doctor's instructions for preparation  and day before procedure activities.  3. Follow the GI doctor's  instructions for medications.   4. diabetes type 1 endocrinologist consult requested , insulin pump self assessment completed in PST, endo- Lucia         made aware, CARMEN precautions.  OR booking notified.   5. PMD and cardiologist consult pending requested by surgeon   6. cbc, bmp, type and screen ( lab aware of kidney transplant, call placed, ptt/inr, a1c,  done today in PST

## 2025-04-29 NOTE — H&P PST ADULT - NSICDXPASTSURGICALHX_GEN_ALL_CORE_FT
PAST SURGICAL HISTORY:  Ear disease Left inner ear surgery, 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, Can NOT have MRI    H/O foot surgery 2005 - right foot - bone fracture - s/p ORIF and subsequent removal of hardware    H/O video-assisted thoracoscopic surgery (VATS)     History of amputation of left fourth toe     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     History of loop recorder 2015    S/P colonoscopy with polypectomy     S/P kidney transplant 2013    Vasectomy status s/p b/l  vasectomy

## 2025-04-29 NOTE — H&P PST ADULT - HISTORY OF PRESENT ILLNESS
60 y/o male with PMH of type 1 diabetes mellitus, HTN, HLD, CVA (2015) left sided weakness and unsteady gait, afib,  DVT (s/p Eliquis), right pleural effusion s/p  VATS 2023, renal transplant (2013) presents to PST for scheduled colonoscopy on 5/14/25. Patient with hx of biegn colon polyp 3 years ago now routine follow up.  60 y/o male with PMH of type 1 diabetes mellitus, HTN, HLD, CVA (2015) left sided weakness and unsteady gait, afib,  DVT (s/p Eliquis), right pleural effusion s/p  VATS 2023, renal transplant (2013) presents to PST for scheduled colonoscopy on 5/14/25. Patient with hx of benign colon polyp 3 years ago now routine follow up.

## 2025-04-30 DIAGNOSIS — Z12.11 ENCOUNTER FOR SCREENING FOR MALIGNANT NEOPLASM OF COLON: ICD-10-CM

## 2025-05-05 ENCOUNTER — APPOINTMENT (OUTPATIENT)
Dept: CARDIOLOGY | Facility: CLINIC | Age: 60
End: 2025-05-05
Payer: MEDICARE

## 2025-05-05 ENCOUNTER — APPOINTMENT (OUTPATIENT)
Dept: DERMATOLOGY | Facility: CLINIC | Age: 60
End: 2025-05-05
Payer: COMMERCIAL

## 2025-05-05 VITALS
OXYGEN SATURATION: 95 % | BODY MASS INDEX: 41.75 KG/M2 | WEIGHT: 315 LBS | HEART RATE: 73 BPM | HEIGHT: 73 IN | SYSTOLIC BLOOD PRESSURE: 105 MMHG | DIASTOLIC BLOOD PRESSURE: 64 MMHG

## 2025-05-05 DIAGNOSIS — I48.91 UNSPECIFIED ATRIAL FIBRILLATION: ICD-10-CM

## 2025-05-05 DIAGNOSIS — D04.72 CARCINOMA IN SITU OF SKIN OF LEFT LOWER LIMB, INCLUDING HIP: ICD-10-CM

## 2025-05-05 PROBLEM — L08.9 LOCAL INFECTION OF THE SKIN AND SUBCUTANEOUS TISSUE, UNSPECIFIED: Chronic | Status: ACTIVE | Noted: 2025-04-29

## 2025-05-05 PROBLEM — E10.9 TYPE 1 DIABETES MELLITUS WITHOUT COMPLICATIONS: Chronic | Status: ACTIVE | Noted: 2025-04-29

## 2025-05-05 PROBLEM — Z12.11 ENCOUNTER FOR SCREENING FOR MALIGNANT NEOPLASM OF COLON: Chronic | Status: ACTIVE | Noted: 2025-04-29

## 2025-05-05 PROCEDURE — G2211 COMPLEX E/M VISIT ADD ON: CPT

## 2025-05-05 PROCEDURE — 99024 POSTOP FOLLOW-UP VISIT: CPT

## 2025-05-05 PROCEDURE — 99214 OFFICE O/P EST MOD 30 MIN: CPT

## 2025-05-05 PROCEDURE — 93000 ELECTROCARDIOGRAM COMPLETE: CPT

## 2025-05-08 NOTE — REVIEW OF SYSTEMS
[Alert] : alert [Flat Open Anterior [place]] : flat open anterior fontanelle [PERRL] : PERRL [Red Reflex Bilateral] : red reflex bilateral [Normally Placed Ears] : normally placed ears [Auricles Well Formed] : auricles well formed [Bony landmarks visible] : bony landmarks visible [Nares Patent] : nares patent [Palate Intact] : palate intact [Uvula Midline] : uvula midline [Supple, full passive range of motion] : supple, full passive range of motion [Symmetric Chest Rise] : symmetric chest rise [Clear to Auscultation Bilaterally] : clear to auscultation bilaterally [Regular Rate and Rhythm] : regular rate and rhythm [S1, S2 present] : S1, S2 present [+2 Femoral Pulses] : +2 femoral pulses [Soft] : soft [Bowel Sounds] : bowel sounds present [Normal external genitailia] : normal external genitalia [Patent Vagina] : vagina patent [Normally Placed] : normally placed [No Abnormal Lymph Nodes Palpated] : no abnormal lymph nodes palpated [Symmetric Flexed Extremities] : symmetric flexed extremities [Startle Reflex] : startle reflex present [Suck Reflex] : suck reflex present [Rooting] : rooting reflex present [Palmar Grasp] : palmar grasp reflex present [Plantar Grasp] : plantar grasp reflex present [Symmetric [place]] : symmetric [place] [Acute Distress] : no acute distress [Discharge] : no discharge [Palpable Masses] : no palpable masses [Murmurs] : no murmurs [Tender] : nontender [Distended] : not distended [Hepatomegaly] : no hepatomegaly [Splenomegaly] : no splenomegaly [Clitoromegaly] : no clitoromegaly [Gomez-Ortolani] : negative Gomez-Ortolani [Spinal Dimple] : no spinal dimple [Tuft of Hair] : no tuft of hair [Rash and/or lesion present] : no rash/lesion [FreeTextEntry2] : (+) Plagiocephaly  [Fever] : no fever [Chills] : no chills [Eye Pain] : no eye pain [Loss Of Hearing] : no hearing loss [Shortness Of Breath] : no shortness of breath [Abdominal Pain] : no abdominal pain [Vomiting] : no vomiting [Skin Lesions] : no skin lesions [Skin Wound] : no skin wound [Anxiety] : no anxiety [Easy Bleeding] : no tendency for easy bleeding [FreeTextEntry5] : HTN,HLD [FreeTextEntry7] : 40.9 BMI , morbid obesity  [FreeTextEntry8] : Kidney Transplant  [FreeTextEntry9] : Associated Diabetic Charcot foot  [de-identified] : plantar right foot midfoot , lateral  DFU 2  , large area of peripheral callus build up , the ulcer is clean and granular s/p surgical intervention  [de-identified] : IDDM with neuropathy  [de-identified] : MARILUZ

## 2025-05-14 ENCOUNTER — OUTPATIENT (OUTPATIENT)
Dept: OUTPATIENT SERVICES | Facility: HOSPITAL | Age: 60
LOS: 1 days | Discharge: HOME | End: 2025-05-14
Payer: COMMERCIAL

## 2025-05-14 ENCOUNTER — APPOINTMENT (OUTPATIENT)
Dept: COLORECTAL SURGERY | Facility: HOSPITAL | Age: 60
End: 2025-05-14
Payer: MEDICARE

## 2025-05-14 VITALS
HEART RATE: 77 BPM | WEIGHT: 315 LBS | OXYGEN SATURATION: 95 % | TEMPERATURE: 98 F | SYSTOLIC BLOOD PRESSURE: 139 MMHG | HEIGHT: 73 IN | DIASTOLIC BLOOD PRESSURE: 75 MMHG

## 2025-05-14 DIAGNOSIS — Z98.890 OTHER SPECIFIED POSTPROCEDURAL STATES: Chronic | ICD-10-CM

## 2025-05-14 DIAGNOSIS — Z94.0 KIDNEY TRANSPLANT STATUS: Chronic | ICD-10-CM

## 2025-05-14 DIAGNOSIS — Z89.421 ACQUIRED ABSENCE OF OTHER RIGHT TOE(S): Chronic | ICD-10-CM

## 2025-05-14 DIAGNOSIS — Z12.11 ENCOUNTER FOR SCREENING FOR MALIGNANT NEOPLASM OF COLON: ICD-10-CM

## 2025-05-14 LAB — GLUCOSE BLDC GLUCOMTR-MCNC: 157 MG/DL — HIGH (ref 70–99)

## 2025-05-14 PROCEDURE — 45378 DIAGNOSTIC COLONOSCOPY: CPT

## 2025-05-14 PROCEDURE — 82962 GLUCOSE BLOOD TEST: CPT

## 2025-05-14 RX ORDER — APIXABAN 2.5 MG/1
1 TABLET, FILM COATED ORAL
Refills: 0 | DISCHARGE

## 2025-05-14 RX ORDER — LOSARTAN POTASSIUM 100 MG/1
1 TABLET, FILM COATED ORAL
Refills: 0 | DISCHARGE

## 2025-05-14 NOTE — ASU PATIENT PROFILE, ADULT - FALL HARM RISK - HARM RISK INTERVENTIONS

## 2025-05-14 NOTE — ASU PATIENT PROFILE, ADULT - NSICDXPASTMEDICALHX_GEN_ALL_CORE_FT
PAST MEDICAL HISTORY:  Afib     CKD (chronic kidney disease) Renal Transplant 2013 at [facility]    Colon cancer screening     CVA (cerebral vascular accident) [place] Stroke  low L body sensation  low R face sensation  decreased peripheral vision  poor balance  2015      Diabetic peripheral neuropathy     History of DVT (deep vein thrombosis) LLE 2016, was on Eliquis x 6 months  Was hospitalized for Rhinovirus at the time, intubated    History of pleural effusion right thoracentesis 6/2022 and 8/2022    History of restrictive lung disease     Hyperlipidemia     Hypertension     Obesity (BMI 30-39.9)     CARMEN treated with BiPAP 2L O2 at night    Recurrent squamous cell carcinoma of skin nose, L arm    Squamous cell carcinoma of skin of left ear nose    Toe infection     Type 1 diabetes

## 2025-05-21 DIAGNOSIS — E10.51 TYPE 1 DIABETES MELLITUS WITH DIABETIC PERIPHERAL ANGIOPATHY WITHOUT GANGRENE: ICD-10-CM

## 2025-05-21 DIAGNOSIS — E78.2 MIXED HYPERLIPIDEMIA: ICD-10-CM

## 2025-05-21 DIAGNOSIS — Z12.11 ENCOUNTER FOR SCREENING FOR MALIGNANT NEOPLASM OF COLON: ICD-10-CM

## 2025-05-21 DIAGNOSIS — K64.4 RESIDUAL HEMORRHOIDAL SKIN TAGS: ICD-10-CM

## 2025-05-21 DIAGNOSIS — E66.01 MORBID (SEVERE) OBESITY DUE TO EXCESS CALORIES: ICD-10-CM

## 2025-05-21 DIAGNOSIS — G47.33 OBSTRUCTIVE SLEEP APNEA (ADULT) (PEDIATRIC): ICD-10-CM

## 2025-05-21 DIAGNOSIS — Z86.73 PERSONAL HISTORY OF TRANSIENT ISCHEMIC ATTACK (TIA), AND CEREBRAL INFARCTION WITHOUT RESIDUAL DEFICITS: ICD-10-CM

## 2025-05-21 DIAGNOSIS — Z79.01 LONG TERM (CURRENT) USE OF ANTICOAGULANTS: ICD-10-CM

## 2025-05-21 DIAGNOSIS — I48.91 UNSPECIFIED ATRIAL FIBRILLATION: ICD-10-CM

## 2025-05-21 DIAGNOSIS — I12.9 HYPERTENSIVE CHRONIC KIDNEY DISEASE WITH STAGE 1 THROUGH STAGE 4 CHRONIC KIDNEY DISEASE, OR UNSPECIFIED CHRONIC KIDNEY DISEASE: ICD-10-CM

## 2025-05-21 DIAGNOSIS — E10.21 TYPE 1 DIABETES MELLITUS WITH DIABETIC NEPHROPATHY: ICD-10-CM

## 2025-05-21 DIAGNOSIS — Z79.4 LONG TERM (CURRENT) USE OF INSULIN: ICD-10-CM

## 2025-05-21 DIAGNOSIS — Z86.718 PERSONAL HISTORY OF OTHER VENOUS THROMBOSIS AND EMBOLISM: ICD-10-CM

## 2025-05-21 DIAGNOSIS — E10.22 TYPE 1 DIABETES MELLITUS WITH DIABETIC CHRONIC KIDNEY DISEASE: ICD-10-CM

## 2025-05-21 DIAGNOSIS — Z94.0 KIDNEY TRANSPLANT STATUS: ICD-10-CM

## 2025-06-04 ENCOUNTER — RX RENEWAL (OUTPATIENT)
Age: 60
End: 2025-06-04

## 2025-06-09 ENCOUNTER — APPOINTMENT (OUTPATIENT)
Dept: COLORECTAL SURGERY | Facility: CLINIC | Age: 60
End: 2025-06-09
Payer: COMMERCIAL

## 2025-06-09 PROCEDURE — 99213 OFFICE O/P EST LOW 20 MIN: CPT

## 2025-06-09 PROCEDURE — G2211 COMPLEX E/M VISIT ADD ON: CPT

## 2025-06-25 ENCOUNTER — OUTPATIENT (OUTPATIENT)
Dept: INPATIENT UNIT | Facility: HOSPITAL | Age: 60
LOS: 1 days | Discharge: HOME | End: 2025-06-25
Payer: MEDICARE

## 2025-06-25 ENCOUNTER — APPOINTMENT (OUTPATIENT)
Dept: COLORECTAL SURGERY | Facility: HOSPITAL | Age: 60
End: 2025-06-25

## 2025-06-25 VITALS
TEMPERATURE: 98 F | HEART RATE: 78 BPM | DIASTOLIC BLOOD PRESSURE: 74 MMHG | WEIGHT: 315 LBS | SYSTOLIC BLOOD PRESSURE: 144 MMHG | RESPIRATION RATE: 16 BRPM | OXYGEN SATURATION: 98 % | HEIGHT: 73 IN

## 2025-06-25 DIAGNOSIS — Z89.421 ACQUIRED ABSENCE OF OTHER RIGHT TOE(S): Chronic | ICD-10-CM

## 2025-06-25 DIAGNOSIS — Z94.0 KIDNEY TRANSPLANT STATUS: Chronic | ICD-10-CM

## 2025-06-25 DIAGNOSIS — Z12.11 ENCOUNTER FOR SCREENING FOR MALIGNANT NEOPLASM OF COLON: ICD-10-CM

## 2025-06-25 DIAGNOSIS — Z98.890 OTHER SPECIFIED POSTPROCEDURAL STATES: Chronic | ICD-10-CM

## 2025-06-25 LAB — GLUCOSE BLDC GLUCOMTR-MCNC: 125 MG/DL — HIGH (ref 70–99)

## 2025-06-25 PROCEDURE — 82962 GLUCOSE BLOOD TEST: CPT

## 2025-06-25 PROCEDURE — 45378 DIAGNOSTIC COLONOSCOPY: CPT

## 2025-06-25 NOTE — ASU PATIENT PROFILE, ADULT - NSICDXPASTMEDICALHX_GEN_ALL_CORE_FT
PAST MEDICAL HISTORY:  Afib     CKD (chronic kidney disease) Renal Transplant 2013 at [facility]    Colon cancer screening     CVA (cerebral vascular accident) [place] Stroke  low L body sensation  low R face sensation  decreased peripheral vision  poor balance  2015      Diabetic peripheral neuropathy     History of DVT (deep vein thrombosis) LLE 2016, was on Eliquis x 6 months  Was hospitalized for Rhinovirus at the time, intubated    History of pleural effusion right thoracentesis 6/2022 and 8/2022    History of restrictive lung disease     Hyperlipidemia     Hypertension     Obesity (BMI 30-39.9)     CARMEN treated with BiPAP 2L O2 at night    Recurrent squamous cell carcinoma of skin nose, L arm    Squamous cell carcinoma of skin of left ear nose    Toe infection     Type 1 diabetes

## 2025-06-30 ENCOUNTER — APPOINTMENT (OUTPATIENT)
Dept: PODIATRY | Facility: CLINIC | Age: 60
End: 2025-06-30

## 2025-07-01 DIAGNOSIS — G47.33 OBSTRUCTIVE SLEEP APNEA (ADULT) (PEDIATRIC): ICD-10-CM

## 2025-07-01 DIAGNOSIS — K64.4 RESIDUAL HEMORRHOIDAL SKIN TAGS: ICD-10-CM

## 2025-07-01 DIAGNOSIS — I10 ESSENTIAL (PRIMARY) HYPERTENSION: ICD-10-CM

## 2025-07-01 DIAGNOSIS — E11.9 TYPE 2 DIABETES MELLITUS WITHOUT COMPLICATIONS: ICD-10-CM

## 2025-07-01 DIAGNOSIS — Z86.73 PERSONAL HISTORY OF TRANSIENT ISCHEMIC ATTACK (TIA), AND CEREBRAL INFARCTION WITHOUT RESIDUAL DEFICITS: ICD-10-CM

## 2025-07-01 DIAGNOSIS — Z12.11 ENCOUNTER FOR SCREENING FOR MALIGNANT NEOPLASM OF COLON: ICD-10-CM

## 2025-07-01 DIAGNOSIS — E78.5 HYPERLIPIDEMIA, UNSPECIFIED: ICD-10-CM

## 2025-07-01 DIAGNOSIS — I48.91 UNSPECIFIED ATRIAL FIBRILLATION: ICD-10-CM

## 2025-07-01 DIAGNOSIS — Z79.01 LONG TERM (CURRENT) USE OF ANTICOAGULANTS: ICD-10-CM

## 2025-07-01 DIAGNOSIS — Z79.4 LONG TERM (CURRENT) USE OF INSULIN: ICD-10-CM

## 2025-07-01 DIAGNOSIS — Z94.0 KIDNEY TRANSPLANT STATUS: ICD-10-CM

## 2025-07-01 DIAGNOSIS — E66.01 MORBID (SEVERE) OBESITY DUE TO EXCESS CALORIES: ICD-10-CM

## 2025-07-01 DIAGNOSIS — Z85.828 PERSONAL HISTORY OF OTHER MALIGNANT NEOPLASM OF SKIN: ICD-10-CM

## 2025-07-01 DIAGNOSIS — Z53.8 PROCEDURE AND TREATMENT NOT CARRIED OUT FOR OTHER REASONS: ICD-10-CM

## 2025-07-21 NOTE — PATIENT PROFILE ADULT - FALL HARM RISK - FACTORS NURSING JUDGEMENT
[TextEntry] : 10 point review of systems is normal other than what is listed above in the history of present illness. 
Yes

## 2025-07-24 ENCOUNTER — APPOINTMENT (OUTPATIENT)
Age: 60
End: 2025-07-24
Payer: MEDICARE

## 2025-07-24 VITALS — BODY MASS INDEX: 41.75 KG/M2 | WEIGHT: 315 LBS | HEIGHT: 73 IN

## 2025-07-24 DIAGNOSIS — B35.1 TINEA UNGUIUM: ICD-10-CM

## 2025-07-24 DIAGNOSIS — L85.3 XEROSIS CUTIS: ICD-10-CM

## 2025-07-24 DIAGNOSIS — E11.42 TYPE 2 DIABETES MELLITUS WITH DIABETIC POLYNEUROPATHY: ICD-10-CM

## 2025-07-24 PROCEDURE — 99213 OFFICE O/P EST LOW 20 MIN: CPT | Mod: 25

## 2025-07-24 PROCEDURE — 11720 DEBRIDE NAIL 1-5: CPT

## 2025-07-27 PROBLEM — L85.3 XEROSIS OF SKIN: Status: ACTIVE | Noted: 2025-07-27

## 2025-08-25 ENCOUNTER — APPOINTMENT (OUTPATIENT)
Dept: CARDIOLOGY | Facility: CLINIC | Age: 60
End: 2025-08-25
Payer: MEDICARE

## 2025-08-25 VITALS
OXYGEN SATURATION: 94 % | WEIGHT: 315 LBS | DIASTOLIC BLOOD PRESSURE: 76 MMHG | SYSTOLIC BLOOD PRESSURE: 130 MMHG | BODY MASS INDEX: 41.75 KG/M2 | HEIGHT: 73 IN | HEART RATE: 75 BPM

## 2025-08-25 DIAGNOSIS — I48.91 UNSPECIFIED ATRIAL FIBRILLATION: ICD-10-CM

## 2025-08-25 PROCEDURE — G2211 COMPLEX E/M VISIT ADD ON: CPT

## 2025-08-25 PROCEDURE — 93000 ELECTROCARDIOGRAM COMPLETE: CPT

## 2025-08-25 PROCEDURE — 99214 OFFICE O/P EST MOD 30 MIN: CPT

## 2025-08-26 ENCOUNTER — APPOINTMENT (OUTPATIENT)
Dept: DERMATOLOGY | Facility: CLINIC | Age: 60
End: 2025-08-26
Payer: COMMERCIAL

## 2025-08-26 DIAGNOSIS — D22.9 MELANOCYTIC NEVI, UNSPECIFIED: ICD-10-CM

## 2025-08-26 DIAGNOSIS — Z12.83 ENCOUNTER FOR SCREENING FOR MALIGNANT NEOPLASM OF SKIN: ICD-10-CM

## 2025-08-26 DIAGNOSIS — L57.0 ACTINIC KERATOSIS: ICD-10-CM

## 2025-08-26 DIAGNOSIS — D04.72 CARCINOMA IN SITU OF SKIN OF LEFT LOWER LIMB, INCLUDING HIP: ICD-10-CM

## 2025-08-26 DIAGNOSIS — L81.4 OTHER MELANIN HYPERPIGMENTATION: ICD-10-CM

## 2025-08-26 PROCEDURE — 17003 DESTRUCT PREMALG LES 2-14: CPT

## 2025-08-26 PROCEDURE — 17000 DESTRUCT PREMALG LESION: CPT

## 2025-08-26 PROCEDURE — 99213 OFFICE O/P EST LOW 20 MIN: CPT | Mod: 25

## (undated) DEVICE — CHEST DRAIN PLEUR-EVAC DRY/WET ADULT-PEDS SINGLE (QUICK)

## (undated) DEVICE — SUT VICRYL 2-0 27" UR-6

## (undated) DEVICE — SUT POLYSORB 4-0 30" V-20 UNDYED

## (undated) DEVICE — SYR SLIP 10CC

## (undated) DEVICE — NDL HYPO REGULAR BEVEL 22G X 1.5" (TURQUOISE)

## (undated) DEVICE — WARMING BLANKET UPPER ADULT

## (undated) DEVICE — VENODYNE/SCD SLEEVE CALF MEDIUM

## (undated) DEVICE — CATH IV SAFE BC 18G X 1.88" (GREEN)

## (undated) DEVICE — PREP BETADINE SPONGE STICKS

## (undated) DEVICE — PACK MAJOR ABDOMINAL WITH LAP

## (undated) DEVICE — DRAPE 3/4 SHEET W REINFORCEMENT 56X77"

## (undated) DEVICE — VENODYNE/SCD SLEEVE CALF LARGE

## (undated) DEVICE — PLV/PSP-TOURNIQUET #4 EK059720: Type: DURABLE MEDICAL EQUIPMENT

## (undated) DEVICE — SUT PROLENE 0 30" CT-1

## (undated) DEVICE — GLV 7.5 PROTEXIS (WHITE)

## (undated) DEVICE — SUT POLYSORB 5-0 18" P-12 UNDYED

## (undated) DEVICE — LABELS BLANK W PEN

## (undated) DEVICE — DRSG WEBRIL 3"

## (undated) DEVICE — DISSECTOR ENDO PEANUT 5MM

## (undated) DEVICE — ELCTR EXTENSION STRAIGHT

## (undated) DEVICE — TUBING SUCTION NONCONDUCTIVE 6MM X 12FT

## (undated) DEVICE — SUT MONOSOF 3-0 18" P-14

## (undated) DEVICE — SUT POLYSORB 2-0 30" V-20 UNDYED

## (undated) DEVICE — DRAPE MAGNETIC INSTRUMENT MEDIUM

## (undated) DEVICE — CONNECTOR Y 3/8 X 3/8 X 3/8

## (undated) DEVICE — DRSG KLING 3"

## (undated) DEVICE — NDL HYPO SAFE 18G X 1.5" (PINK)

## (undated) DEVICE — DRSG CURITY GAUZE SPONGE 4 X 4" 12-PLY

## (undated) DEVICE — DRSG WEBRIL 6"

## (undated) DEVICE — SUT POLYSORB 3-0 30" V-20 UNDYED

## (undated) DEVICE — SUT POLYSORB 2-0 30" GS-22 UNDYED

## (undated) DEVICE — DRSG TEGADERM 4X4.75"

## (undated) DEVICE — SUT VICRYL 0 27" CT-1 UNDYED

## (undated) DEVICE — VISITEC 4X4

## (undated) DEVICE — POSITIONER FOAM EGG CRATE ULNAR 2PCS (PINK)

## (undated) DEVICE — BLADE SCALPEL SAFETYLOCK #15

## (undated) DEVICE — PACK LOWER EXTREMITY NS PLAINVI

## (undated) DEVICE — DRSG KLING 6"

## (undated) DEVICE — SUT MONOCRYL 4-0 27" PS-2 UNDYED

## (undated) DEVICE — DRAPE IOBAN 23" X 23"

## (undated) DEVICE — FLOORMAT SURGISAFE ABSORBANT WHITE 36" X 72"

## (undated) DEVICE — SUT SILK 2-0 24" TIES

## (undated) DEVICE — GLV 7 PROTEXIS (WHITE)

## (undated) DEVICE — SYR LUER LOK 5CC

## (undated) DEVICE — DRAPE LARGE SHEET 72X85"

## (undated) DEVICE — DRAPE IOBAN 33" X 23"

## (undated) DEVICE — BLADE SCALPEL SAFETYLOCK #10

## (undated) DEVICE — ADAPTER FIBEROPTIC BRONCHOSCOPE DUAL AXIS SWIVEL

## (undated) DEVICE — SUT MONOSOF 2-0 18" C-15

## (undated) DEVICE — LIGASURE SMALL JAW

## (undated) DEVICE — DRSG XEROFORM 1 X 8"

## (undated) DEVICE — SUT SILK 0 18" TIES

## (undated) DEVICE — SYR LUER LOK 10CC

## (undated) DEVICE — PLV/PSP-ESU FORCEFX F8J7721A: Type: DURABLE MEDICAL EQUIPMENT

## (undated) DEVICE — CHEST DRAIN PLEUR-EVAC DRY/DRY ADULT-PEDS SINGLE (QUICK)

## (undated) DEVICE — STAPLER ECHELON FLEX POWERED PLUS 340MM

## (undated) DEVICE — HAND-AID ARTERIAL WRIST SUPPORT

## (undated) DEVICE — SOL IRR POUR H2O 1000ML

## (undated) DEVICE — ENDOCATCH 10MM SPECIMEN POUCH

## (undated) DEVICE — SUT SILK 2-0 30" TIES

## (undated) DEVICE — PREP BETADINE KIT

## (undated) DEVICE — DRSG COMBINE 5X9"

## (undated) DEVICE — PLV/PSP-ESU FORCE2 FIL 16736T: Type: DURABLE MEDICAL EQUIPMENT

## (undated) DEVICE — BUR MICROAIRE CARBIDE OVAL 4MM 8 FLUTE

## (undated) DEVICE — SOL ANTI FOG

## (undated) DEVICE — PLV-SCD MACHINE: Type: DURABLE MEDICAL EQUIPMENT

## (undated) DEVICE — ELCTR BOVIE TIP BLADE INSULATED 2.75" EDGE

## (undated) DEVICE — DRSG CAST FIBERGLASS 4"

## (undated) DEVICE — SPECIMEN CONTAINER 4OZ

## (undated) DEVICE — D HELP - CLEARVIEW CLEARIFY SYSTEM

## (undated) DEVICE — DRSG TELFA 3 X 8

## (undated) DEVICE — NDL HYPO SAFE 22G X 1.5" (BLACK)

## (undated) DEVICE — TOURNIQUET CUFF 18" DUAL PORT SINGLE BLADDER LUER LOCK (BLACK)

## (undated) DEVICE — DISSECTOR ENDOSCOPIC KITTNER SINGLE TIP

## (undated) DEVICE — SUT VICRYL 1 27" CTX

## (undated) DEVICE — SOL IRR POUR NS 0.9% 1000ML

## (undated) DEVICE — PREP ALCOHOL PAD MED

## (undated) DEVICE — DRAPE SURGICAL #1010

## (undated) DEVICE — DRAPE INSTRUMENT POUCH 6.75" X 11"

## (undated) DEVICE — DRSG STERISTRIPS 0.5 X 4"

## (undated) DEVICE — SAW BLADE LINVATEC SAGITTAL MIC 9.5X25.5X0.4MM

## (undated) DEVICE — FOLEY CATH 2-WAY 16FR 5CC SILICONE

## (undated) DEVICE — ELCTR BOVIE TIP BLADE INSULATED 6.5" EDGE

## (undated) DEVICE — PACK MAJOR ABDOMINAL W ENDO DRAPE

## (undated) DEVICE — DRAPE TOWEL BLUE STICKY

## (undated) DEVICE — ENDOCATCH II 15MM

## (undated) DEVICE — SUT MONOSOF 4-0 18" P-12

## (undated) DEVICE — SUT POLYSORB 4-0 18" P-12 UNDYED

## (undated) DEVICE — DRSG ADAPTIC 3 X 8"

## (undated) DEVICE — DISSECTOR KITTNER 5 MM TIP

## (undated) DEVICE — DRSG KERLIX ROLL 4.5"

## (undated) DEVICE — PLV/PSP-ESU T7J19648DX: Type: DURABLE MEDICAL EQUIPMENT

## (undated) DEVICE — STAPLER SKIN VISI-STAT 35 WIDE

## (undated) DEVICE — DRSG BENZOIN 0.6CC

## (undated) DEVICE — FOLEY TRAY 16FR 5CC LF UMETER CLOSED

## (undated) DEVICE — TAPE SILK 3"

## (undated) DEVICE — TROCAR ETHICON ENDOPATH RIGID THORACIC 10/12MM X 75MM RIDGID

## (undated) DEVICE — DRAPE GENERAL ENDOSCOPY

## (undated) DEVICE — SAW BLADE RASP CONMED LINVATEC MICRO 100 OSCILLATING 5.5 X 13 X 0.4MM

## (undated) DEVICE — POSITIONER STRAP ARMBOARD VELCRO TS-30

## (undated) DEVICE — ELCTR GROUNDING PAD ADULT COVIDIEN

## (undated) DEVICE — CATH INSERTION TRAY W 10CC SYRINGE

## (undated) DEVICE — DRAPE STERI-DRAPE MEDIUM W APERTURE

## (undated) DEVICE — SAW BLADE LINVATEC SAGITTAL 19.5X41.0X..4MM COARSE

## (undated) DEVICE — PREP CHLORAPREP HI-LITE ORANGE 26ML

## (undated) DEVICE — DRSG CAST PLASTER 6" (BLUE)

## (undated) DEVICE — SUT VICRYL 3-0 27" SH UNDYED

## (undated) DEVICE — WARMING BLANKET LOWER ADULT

## (undated) DEVICE — SOL INJ LR 1000ML

## (undated) DEVICE — ANESTHESIA CIRCUIT ADULT HMEF

## (undated) DEVICE — CONNECTOR REDUCING STRAIGHT 3/8X0.25"

## (undated) DEVICE — TUBING IV EXTENSION MICROBORE W MICROCLAVE 7"